# Patient Record
Sex: FEMALE | Race: OTHER | HISPANIC OR LATINO | Employment: OTHER | URBAN - METROPOLITAN AREA
[De-identification: names, ages, dates, MRNs, and addresses within clinical notes are randomized per-mention and may not be internally consistent; named-entity substitution may affect disease eponyms.]

---

## 2017-01-24 ENCOUNTER — TRANSCRIBE ORDERS (OUTPATIENT)
Dept: LAB | Facility: CLINIC | Age: 52
End: 2017-01-24

## 2017-01-24 ENCOUNTER — APPOINTMENT (OUTPATIENT)
Dept: LAB | Facility: CLINIC | Age: 52
End: 2017-01-24
Payer: MEDICARE

## 2017-01-24 DIAGNOSIS — I26.99 PULMONARY INFARCTION (HCC): Primary | ICD-10-CM

## 2017-01-24 DIAGNOSIS — I26.99 PULMONARY INFARCTION (HCC): ICD-10-CM

## 2017-01-24 LAB
INR PPP: 1.71 (ref 0.86–1.16)
PROTHROMBIN TIME: 20 SECONDS (ref 12–14.3)

## 2017-01-24 PROCEDURE — 85610 PROTHROMBIN TIME: CPT

## 2017-01-24 PROCEDURE — 36415 COLL VENOUS BLD VENIPUNCTURE: CPT

## 2017-02-14 ENCOUNTER — APPOINTMENT (OUTPATIENT)
Dept: LAB | Facility: CLINIC | Age: 52
End: 2017-02-14
Payer: MEDICARE

## 2017-02-14 DIAGNOSIS — I26.99 PULMONARY INFARCTION (HCC): ICD-10-CM

## 2017-02-14 LAB
INR PPP: 1.1 (ref 0.86–1.16)
PROTHROMBIN TIME: 14.3 SECONDS (ref 12–14.3)

## 2017-02-14 PROCEDURE — 85610 PROTHROMBIN TIME: CPT

## 2017-02-14 PROCEDURE — 36415 COLL VENOUS BLD VENIPUNCTURE: CPT

## 2017-02-18 ENCOUNTER — APPOINTMENT (EMERGENCY)
Dept: RADIOLOGY | Facility: HOSPITAL | Age: 52
End: 2017-02-18
Payer: MEDICARE

## 2017-02-18 ENCOUNTER — HOSPITAL ENCOUNTER (OUTPATIENT)
Facility: HOSPITAL | Age: 52
Setting detail: OBSERVATION
Discharge: HOME | End: 2017-02-19
Attending: EMERGENCY MEDICINE | Admitting: INTERNAL MEDICINE
Payer: MEDICARE

## 2017-02-18 DIAGNOSIS — R06.00 DYSPNEA: Primary | ICD-10-CM

## 2017-02-18 DIAGNOSIS — I10 ESSENTIAL HYPERTENSION: Chronic | ICD-10-CM

## 2017-02-18 DIAGNOSIS — N18.6 ESRD ON HEMODIALYSIS (HCC): Chronic | ICD-10-CM

## 2017-02-18 DIAGNOSIS — E11.8 DIABETES MELLITUS TYPE 2 WITH COMPLICATIONS (HCC): ICD-10-CM

## 2017-02-18 DIAGNOSIS — Z86.718 HISTORY OF DVT (DEEP VEIN THROMBOSIS): ICD-10-CM

## 2017-02-18 DIAGNOSIS — D63.8 ANEMIA OF CHRONIC DISEASE: Chronic | ICD-10-CM

## 2017-02-18 DIAGNOSIS — Z99.2 ESRD ON HEMODIALYSIS (HCC): Chronic | ICD-10-CM

## 2017-02-18 DIAGNOSIS — N18.6 ESRD (END STAGE RENAL DISEASE) (HCC): ICD-10-CM

## 2017-02-18 PROBLEM — R06.89 DIFFICULTY BREATHING: Status: ACTIVE | Noted: 2017-02-18

## 2017-02-18 LAB
ANION GAP SERPL CALCULATED.3IONS-SCNC: 10 MMOL/L (ref 4–13)
BASOPHILS # BLD AUTO: 0.02 THOUSANDS/ΜL (ref 0–0.1)
BASOPHILS NFR BLD AUTO: 0 % (ref 0–1)
BUN SERPL-MCNC: 53 MG/DL (ref 5–25)
CALCIUM SERPL-MCNC: 7.9 MG/DL (ref 8.3–10.1)
CHLORIDE SERPL-SCNC: 104 MMOL/L (ref 100–108)
CO2 SERPL-SCNC: 28 MMOL/L (ref 21–32)
CREAT SERPL-MCNC: 5.68 MG/DL (ref 0.6–1.3)
EOSINOPHIL # BLD AUTO: 0.17 THOUSAND/ΜL (ref 0–0.61)
EOSINOPHIL NFR BLD AUTO: 3 % (ref 0–6)
ERYTHROCYTE [DISTWIDTH] IN BLOOD BY AUTOMATED COUNT: 12.6 % (ref 11.6–15.1)
GFR SERPL CREATININE-BSD FRML MDRD: 7.9 ML/MIN/1.73SQ M
GLUCOSE SERPL-MCNC: 142 MG/DL (ref 65–140)
HCT VFR BLD AUTO: 25.8 % (ref 34.8–46.1)
HGB BLD-MCNC: 8.4 G/DL (ref 11.5–15.4)
LYMPHOCYTES # BLD AUTO: 1.19 THOUSANDS/ΜL (ref 0.6–4.47)
LYMPHOCYTES NFR BLD AUTO: 17 % (ref 14–44)
MAGNESIUM SERPL-MCNC: 2.3 MG/DL (ref 1.6–2.6)
MCH RBC QN AUTO: 30.5 PG (ref 26.8–34.3)
MCHC RBC AUTO-ENTMCNC: 32.6 G/DL (ref 31.4–37.4)
MCV RBC AUTO: 94 FL (ref 82–98)
MONOCYTES # BLD AUTO: 0.37 THOUSAND/ΜL (ref 0.17–1.22)
MONOCYTES NFR BLD AUTO: 5 % (ref 4–12)
NEUTROPHILS # BLD AUTO: 5.12 THOUSANDS/ΜL (ref 1.85–7.62)
NEUTS SEG NFR BLD AUTO: 75 % (ref 43–75)
NRBC BLD AUTO-RTO: 0 /100 WBCS
NT-PROBNP SERPL-MCNC: ABNORMAL PG/ML
PLATELET # BLD AUTO: 183 THOUSANDS/UL (ref 149–390)
PMV BLD AUTO: 9.9 FL (ref 8.9–12.7)
POTASSIUM SERPL-SCNC: 4.7 MMOL/L (ref 3.5–5.3)
RBC # BLD AUTO: 2.75 MILLION/UL (ref 3.81–5.12)
SODIUM SERPL-SCNC: 142 MMOL/L (ref 136–145)
SPECIMEN SOURCE: NORMAL
TROPONIN I BLD-MCNC: 0.01 NG/ML (ref 0–0.08)
WBC # BLD AUTO: 6.9 THOUSAND/UL (ref 4.31–10.16)

## 2017-02-18 PROCEDURE — 85025 COMPLETE CBC W/AUTO DIFF WBC: CPT | Performed by: EMERGENCY MEDICINE

## 2017-02-18 PROCEDURE — 93005 ELECTROCARDIOGRAM TRACING: CPT

## 2017-02-18 PROCEDURE — 84484 ASSAY OF TROPONIN QUANT: CPT

## 2017-02-18 PROCEDURE — 96374 THER/PROPH/DIAG INJ IV PUSH: CPT

## 2017-02-18 PROCEDURE — 80048 BASIC METABOLIC PNL TOTAL CA: CPT | Performed by: EMERGENCY MEDICINE

## 2017-02-18 PROCEDURE — 71020 HB CHEST X-RAY 2VW FRONTAL&LATL: CPT

## 2017-02-18 PROCEDURE — 36415 COLL VENOUS BLD VENIPUNCTURE: CPT | Performed by: EMERGENCY MEDICINE

## 2017-02-18 PROCEDURE — 83880 ASSAY OF NATRIURETIC PEPTIDE: CPT | Performed by: EMERGENCY MEDICINE

## 2017-02-18 PROCEDURE — 83735 ASSAY OF MAGNESIUM: CPT | Performed by: EMERGENCY MEDICINE

## 2017-02-18 RX ORDER — ONDANSETRON 2 MG/ML
4 INJECTION INTRAMUSCULAR; INTRAVENOUS ONCE
Status: COMPLETED | OUTPATIENT
Start: 2017-02-18 | End: 2017-02-18

## 2017-02-18 RX ORDER — ACETAMINOPHEN 325 MG/1
650 TABLET ORAL ONCE
Status: COMPLETED | OUTPATIENT
Start: 2017-02-18 | End: 2017-02-18

## 2017-02-18 RX ADMIN — ONDANSETRON 4 MG: 2 INJECTION INTRAMUSCULAR; INTRAVENOUS at 21:58

## 2017-02-18 RX ADMIN — ACETAMINOPHEN 650 MG: 325 TABLET ORAL at 21:58

## 2017-02-18 RX ADMIN — NITROGLYCERIN 0.5 INCH: 20 OINTMENT TOPICAL at 21:33

## 2017-02-19 ENCOUNTER — APPOINTMENT (OUTPATIENT)
Dept: DIALYSIS | Facility: HOSPITAL | Age: 52
End: 2017-02-19
Payer: MEDICARE

## 2017-02-19 VITALS
DIASTOLIC BLOOD PRESSURE: 51 MMHG | TEMPERATURE: 97.1 F | HEIGHT: 66 IN | SYSTOLIC BLOOD PRESSURE: 119 MMHG | BODY MASS INDEX: 41.42 KG/M2 | WEIGHT: 257.72 LBS | HEART RATE: 67 BPM | RESPIRATION RATE: 16 BRPM | OXYGEN SATURATION: 97 %

## 2017-02-19 PROBLEM — R06.00 DYSPNEA: Status: RESOLVED | Noted: 2017-02-18 | Resolved: 2017-02-19

## 2017-02-19 PROBLEM — E87.70 VOLUME OVERLOAD: Status: ACTIVE | Noted: 2017-02-19

## 2017-02-19 PROBLEM — Z91.199 NONCOMPLIANCE: Chronic | Status: ACTIVE | Noted: 2017-02-19

## 2017-02-19 PROBLEM — Z91.19 NONCOMPLIANCE: Chronic | Status: ACTIVE | Noted: 2017-02-19

## 2017-02-19 PROBLEM — R06.00 DYSPNEA: Status: ACTIVE | Noted: 2017-02-18

## 2017-02-19 LAB
ATRIAL RATE: 87 BPM
GLUCOSE SERPL-MCNC: 146 MG/DL (ref 65–140)
GLUCOSE SERPL-MCNC: 188 MG/DL (ref 65–140)
INR PPP: 1.24 (ref 0.86–1.16)
P AXIS: 69 DEGREES
PR INTERVAL: 156 MS
PROTHROMBIN TIME: 15.7 SECONDS (ref 12–14.3)
QRS AXIS: 72 DEGREES
QRSD INTERVAL: 90 MS
QT INTERVAL: 346 MS
QTC INTERVAL: 416 MS
T WAVE AXIS: 79 DEGREES
VENTRICULAR RATE: 87 BPM

## 2017-02-19 PROCEDURE — 85610 PROTHROMBIN TIME: CPT | Performed by: INTERNAL MEDICINE

## 2017-02-19 PROCEDURE — 99285 EMERGENCY DEPT VISIT HI MDM: CPT

## 2017-02-19 PROCEDURE — 82948 REAGENT STRIP/BLOOD GLUCOSE: CPT

## 2017-02-19 PROCEDURE — G0257 UNSCHED DIALYSIS ESRD PT HOS: HCPCS

## 2017-02-19 RX ORDER — METOCLOPRAMIDE 5 MG/1
5 TABLET ORAL
COMMUNITY
End: 2017-08-08

## 2017-02-19 RX ORDER — CIPROFLOXACIN HYDROCHLORIDE 3.5 MG/ML
1 SOLUTION/ DROPS TOPICAL 3 TIMES DAILY
Status: DISCONTINUED | OUTPATIENT
Start: 2017-02-19 | End: 2017-02-19 | Stop reason: HOSPADM

## 2017-02-19 RX ORDER — CALCIUM CARBONATE 200(500)MG
500 TABLET,CHEWABLE ORAL DAILY PRN
Status: DISCONTINUED | OUTPATIENT
Start: 2017-02-19 | End: 2017-02-19 | Stop reason: HOSPADM

## 2017-02-19 RX ORDER — ACETAZOLAMIDE 250 MG/1
250 TABLET ORAL 3 TIMES DAILY
Status: DISCONTINUED | OUTPATIENT
Start: 2017-02-19 | End: 2017-02-19 | Stop reason: HOSPADM

## 2017-02-19 RX ORDER — PREDNISOLONE ACETATE 10 MG/ML
1 SUSPENSION/ DROPS OPHTHALMIC 4 TIMES DAILY
Status: DISCONTINUED | OUTPATIENT
Start: 2017-02-19 | End: 2017-02-19 | Stop reason: HOSPADM

## 2017-02-19 RX ORDER — WARFARIN SODIUM 5 MG/1
10 TABLET ORAL
Status: ON HOLD | COMMUNITY
End: 2017-02-19

## 2017-02-19 RX ORDER — LEVOTHYROXINE SODIUM 0.05 MG/1
50 TABLET ORAL
Status: DISCONTINUED | OUTPATIENT
Start: 2017-02-19 | End: 2017-02-19 | Stop reason: HOSPADM

## 2017-02-19 RX ORDER — CLONIDINE HYDROCHLORIDE 0.1 MG/1
0.1 TABLET ORAL 2 TIMES DAILY
COMMUNITY
End: 2019-08-31 | Stop reason: HOSPADM

## 2017-02-19 RX ORDER — ACETAZOLAMIDE 125 MG/1
125 TABLET ORAL EVERY 8 HOURS SCHEDULED
Status: DISCONTINUED | OUTPATIENT
Start: 2017-02-19 | End: 2017-02-19

## 2017-02-19 RX ORDER — ALPRAZOLAM 0.25 MG/1
0.12 TABLET ORAL EVERY 4 HOURS PRN
Status: DISCONTINUED | OUTPATIENT
Start: 2017-02-19 | End: 2017-02-19 | Stop reason: HOSPADM

## 2017-02-19 RX ORDER — OXYCODONE HYDROCHLORIDE AND ACETAMINOPHEN 5; 325 MG/1; MG/1
1 TABLET ORAL EVERY 6 HOURS PRN
Status: DISCONTINUED | OUTPATIENT
Start: 2017-02-19 | End: 2017-02-19 | Stop reason: HOSPADM

## 2017-02-19 RX ORDER — ONDANSETRON 4 MG/1
4 TABLET, ORALLY DISINTEGRATING ORAL EVERY 4 HOURS PRN
Status: DISCONTINUED | OUTPATIENT
Start: 2017-02-19 | End: 2017-02-19 | Stop reason: HOSPADM

## 2017-02-19 RX ORDER — BRIMONIDINE TARTRATE 0.15 %
1 DROPS OPHTHALMIC (EYE) 3 TIMES DAILY
Status: DISCONTINUED | OUTPATIENT
Start: 2017-02-19 | End: 2017-02-19 | Stop reason: HOSPADM

## 2017-02-19 RX ORDER — HYDRALAZINE HYDROCHLORIDE 20 MG/ML
10 INJECTION INTRAMUSCULAR; INTRAVENOUS EVERY 4 HOURS PRN
Status: DISCONTINUED | OUTPATIENT
Start: 2017-02-19 | End: 2017-02-19

## 2017-02-19 RX ORDER — WARFARIN SODIUM 5 MG/1
12.5 TABLET ORAL
Qty: 75 TABLET | Refills: 0 | Status: ON HOLD
Start: 2017-02-19 | End: 2017-04-09

## 2017-02-19 RX ORDER — DORZOLAMIDE HYDROCHLORIDE AND TIMOLOL MALEATE 20; 5 MG/ML; MG/ML
1 SOLUTION/ DROPS OPHTHALMIC 2 TIMES DAILY
Status: DISCONTINUED | OUTPATIENT
Start: 2017-02-19 | End: 2017-02-19 | Stop reason: HOSPADM

## 2017-02-19 RX ORDER — PAROXETINE HYDROCHLORIDE 20 MG/1
10 TABLET, FILM COATED ORAL EVERY MORNING
Status: DISCONTINUED | OUTPATIENT
Start: 2017-02-19 | End: 2017-02-19 | Stop reason: HOSPADM

## 2017-02-19 RX ORDER — LOSARTAN POTASSIUM 50 MG/1
100 TABLET ORAL DAILY
Status: DISCONTINUED | OUTPATIENT
Start: 2017-02-19 | End: 2017-02-19 | Stop reason: HOSPADM

## 2017-02-19 RX ORDER — MELATONIN
2000 DAILY
Status: DISCONTINUED | OUTPATIENT
Start: 2017-02-19 | End: 2017-02-19 | Stop reason: HOSPADM

## 2017-02-19 RX ORDER — INSULIN GLARGINE 100 [IU]/ML
24 INJECTION, SOLUTION SUBCUTANEOUS
Qty: 720 UNITS | Refills: 0 | Status: ON HOLD
Start: 2017-02-19 | End: 2019-04-27 | Stop reason: CLARIF

## 2017-02-19 RX ORDER — INSULIN GLARGINE 100 [IU]/ML
28 INJECTION, SOLUTION SUBCUTANEOUS
Status: DISCONTINUED | OUTPATIENT
Start: 2017-02-19 | End: 2017-02-19 | Stop reason: HOSPADM

## 2017-02-19 RX ORDER — CARVEDILOL 25 MG/1
25 TABLET ORAL 2 TIMES DAILY WITH MEALS
Status: DISCONTINUED | OUTPATIENT
Start: 2017-02-19 | End: 2017-02-19 | Stop reason: HOSPADM

## 2017-02-19 RX ORDER — ALBUTEROL SULFATE 90 UG/1
2 AEROSOL, METERED RESPIRATORY (INHALATION) EVERY 4 HOURS PRN
Status: DISCONTINUED | OUTPATIENT
Start: 2017-02-19 | End: 2017-02-19 | Stop reason: HOSPADM

## 2017-02-19 RX ORDER — CHOLECALCIFEROL (VITAMIN D3) 10 MCG
1 TABLET ORAL DAILY
Status: DISCONTINUED | OUTPATIENT
Start: 2017-02-19 | End: 2017-02-19 | Stop reason: HOSPADM

## 2017-02-19 RX ORDER — OXYCODONE HYDROCHLORIDE AND ACETAMINOPHEN 5; 325 MG/1; MG/1
TABLET ORAL
Status: COMPLETED
Start: 2017-02-19 | End: 2017-02-19

## 2017-02-19 RX ORDER — DIPHENHYDRAMINE HCL 25 MG
25 TABLET ORAL
Status: DISCONTINUED | OUTPATIENT
Start: 2017-02-19 | End: 2017-02-19 | Stop reason: HOSPADM

## 2017-02-19 RX ORDER — DOCUSATE SODIUM 100 MG/1
100 CAPSULE, LIQUID FILLED ORAL 2 TIMES DAILY PRN
Status: DISCONTINUED | OUTPATIENT
Start: 2017-02-19 | End: 2017-02-19 | Stop reason: HOSPADM

## 2017-02-19 RX ORDER — LANTHANUM CARBONATE 500 MG/1
1000 TABLET, CHEWABLE ORAL
Status: DISCONTINUED | OUTPATIENT
Start: 2017-02-19 | End: 2017-02-19 | Stop reason: HOSPADM

## 2017-02-19 RX ORDER — OXYCODONE HYDROCHLORIDE 10 MG/1
10 TABLET ORAL EVERY 6 HOURS PRN
Status: DISCONTINUED | OUTPATIENT
Start: 2017-02-19 | End: 2017-02-19

## 2017-02-19 RX ORDER — AMLODIPINE BESYLATE 10 MG/1
10 TABLET ORAL DAILY
Status: DISCONTINUED | OUTPATIENT
Start: 2017-02-19 | End: 2017-02-19 | Stop reason: HOSPADM

## 2017-02-19 RX ORDER — OXYCODONE HYDROCHLORIDE AND ACETAMINOPHEN 5; 325 MG/1; MG/1
1 TABLET ORAL EVERY 8 HOURS PRN
Qty: 5 TABLET | Refills: 0 | Status: ON HOLD | OUTPATIENT
Start: 2017-02-19 | End: 2017-04-09

## 2017-02-19 RX ORDER — OMEPRAZOLE 20 MG/1
20 CAPSULE, DELAYED RELEASE ORAL DAILY
COMMUNITY
End: 2017-02-19 | Stop reason: HOSPADM

## 2017-02-19 RX ORDER — GABAPENTIN 100 MG/1
200 CAPSULE ORAL
Status: DISCONTINUED | OUTPATIENT
Start: 2017-02-19 | End: 2017-02-19 | Stop reason: HOSPADM

## 2017-02-19 RX ORDER — ONDANSETRON HYDROCHLORIDE 4 MG/5ML
4 SOLUTION ORAL EVERY 4 HOURS PRN
Status: DISCONTINUED | OUTPATIENT
Start: 2017-02-19 | End: 2017-02-19 | Stop reason: CLARIF

## 2017-02-19 RX ORDER — CHOLECALCIFEROL (VITAMIN D3) 25 MCG
1 TABLET,CHEWABLE ORAL
Status: DISCONTINUED | OUTPATIENT
Start: 2017-02-19 | End: 2017-02-19

## 2017-02-19 RX ORDER — CLONIDINE HYDROCHLORIDE 0.1 MG/1
0.3 TABLET ORAL
Status: DISCONTINUED | OUTPATIENT
Start: 2017-02-19 | End: 2017-02-19 | Stop reason: HOSPADM

## 2017-02-19 RX ORDER — WARFARIN SODIUM 5 MG/1
10 TABLET ORAL
Status: DISCONTINUED | OUTPATIENT
Start: 2017-02-19 | End: 2017-02-19 | Stop reason: HOSPADM

## 2017-02-19 RX ORDER — PANTOPRAZOLE SODIUM 40 MG/1
40 TABLET, DELAYED RELEASE ORAL 2 TIMES DAILY
Status: DISCONTINUED | OUTPATIENT
Start: 2017-02-19 | End: 2017-02-19 | Stop reason: HOSPADM

## 2017-02-19 RX ORDER — ATROPINE SULFATE 10 MG/ML
1 SOLUTION/ DROPS OPHTHALMIC 3 TIMES DAILY
Status: DISCONTINUED | OUTPATIENT
Start: 2017-02-19 | End: 2017-02-19 | Stop reason: HOSPADM

## 2017-02-19 RX ADMIN — LEVOTHYROXINE SODIUM 50 MCG: 50 TABLET ORAL at 06:38

## 2017-02-19 RX ADMIN — CIPROFLOXACIN HYDROCHLORIDE 1 DROP: 3 SOLUTION/ DROPS OPHTHALMIC at 03:53

## 2017-02-19 RX ADMIN — GABAPENTIN 200 MG: 100 CAPSULE ORAL at 03:49

## 2017-02-19 RX ADMIN — ACETAZOLAMIDE 250 MG: 250 TABLET ORAL at 03:49

## 2017-02-19 RX ADMIN — ATROPINE SULFATE 1 DROP: 10 SOLUTION/ DROPS OPHTHALMIC at 03:51

## 2017-02-19 RX ADMIN — BRIMONIDINE TARTRATE 1 DROP: 1.5 SOLUTION OPHTHALMIC at 03:51

## 2017-02-19 RX ADMIN — PANTOPRAZOLE SODIUM 40 MG: 40 TABLET, DELAYED RELEASE ORAL at 06:38

## 2017-02-19 RX ADMIN — CLONIDINE HYDROCHLORIDE 0.3 MG: 0.1 TABLET ORAL at 03:49

## 2017-02-19 RX ADMIN — OXYCODONE HYDROCHLORIDE AND ACETAMINOPHEN 1 TABLET: 5; 325 TABLET ORAL at 00:29

## 2017-02-19 RX ADMIN — DORZOLAMIDE HYDROCHLORIDE AND TIMOLOL MALEATE 1 DROP: 20; 5 SOLUTION/ DROPS OPHTHALMIC at 03:55

## 2017-02-19 RX ADMIN — PREDNISOLONE ACETATE 1 DROP: 10 SUSPENSION/ DROPS OPHTHALMIC at 03:54

## 2017-02-19 RX ADMIN — HYDRALAZINE HYDROCHLORIDE 10 MG: 20 INJECTION INTRAMUSCULAR; INTRAVENOUS at 03:32

## 2017-02-19 RX ADMIN — DIPHENHYDRAMINE HCL 25 MG: 25 TABLET ORAL at 03:49

## 2017-02-19 RX ADMIN — NITROGLYCERIN 0.5 INCH: 20 OINTMENT TOPICAL at 03:38

## 2017-02-19 RX ADMIN — OXYCODONE HYDROCHLORIDE AND ACETAMINOPHEN 1 TABLET: 5; 325 TABLET ORAL at 08:55

## 2017-04-06 ENCOUNTER — APPOINTMENT (INPATIENT)
Dept: RADIOLOGY | Facility: HOSPITAL | Age: 52
DRG: 640 | End: 2017-04-06
Payer: MEDICARE

## 2017-04-06 ENCOUNTER — HOSPITAL ENCOUNTER (INPATIENT)
Facility: HOSPITAL | Age: 52
LOS: 3 days | Discharge: HOME | DRG: 640 | End: 2017-04-09
Attending: EMERGENCY MEDICINE | Admitting: HOSPITALIST
Payer: MEDICARE

## 2017-04-06 ENCOUNTER — APPOINTMENT (EMERGENCY)
Dept: RADIOLOGY | Facility: HOSPITAL | Age: 52
DRG: 640 | End: 2017-04-06
Payer: MEDICARE

## 2017-04-06 DIAGNOSIS — D64.9 SYMPTOMATIC ANEMIA: ICD-10-CM

## 2017-04-06 DIAGNOSIS — E87.70 HYPERVOLEMIA, UNSPECIFIED HYPERVOLEMIA TYPE: ICD-10-CM

## 2017-04-06 DIAGNOSIS — N18.6 ESRD ON HEMODIALYSIS (HCC): Chronic | ICD-10-CM

## 2017-04-06 DIAGNOSIS — E87.5 HYPERKALEMIA: ICD-10-CM

## 2017-04-06 DIAGNOSIS — Z99.2 ESRD ON HEMODIALYSIS (HCC): Chronic | ICD-10-CM

## 2017-04-06 DIAGNOSIS — R06.00 DYSPNEA: Primary | ICD-10-CM

## 2017-04-06 DIAGNOSIS — N18.6 ESRD (END STAGE RENAL DISEASE) (HCC): ICD-10-CM

## 2017-04-06 DIAGNOSIS — D64.9 ANEMIA: ICD-10-CM

## 2017-04-06 DIAGNOSIS — E87.70 FLUID OVERLOAD: ICD-10-CM

## 2017-04-06 PROBLEM — I10 ACCELERATED HYPERTENSION: Status: ACTIVE | Noted: 2017-04-06

## 2017-04-06 PROBLEM — F41.9 ANXIETY DISORDER: Status: ACTIVE | Noted: 2017-04-06

## 2017-04-06 LAB
ANION GAP SERPL CALCULATED.3IONS-SCNC: 11 MMOL/L (ref 4–13)
BASOPHILS # BLD AUTO: 0.02 THOUSANDS/ΜL (ref 0–0.1)
BASOPHILS NFR BLD AUTO: 0 % (ref 0–1)
BUN SERPL-MCNC: 88 MG/DL (ref 5–25)
CALCIUM SERPL-MCNC: 7.3 MG/DL (ref 8.3–10.1)
CHLORIDE SERPL-SCNC: 100 MMOL/L (ref 100–108)
CO2 SERPL-SCNC: 26 MMOL/L (ref 21–32)
CREAT SERPL-MCNC: 9.59 MG/DL (ref 0.6–1.3)
DEPRECATED D DIMER PPP: 1174 NG/ML (FEU) (ref 0–424)
EOSINOPHIL # BLD AUTO: 0.18 THOUSAND/ΜL (ref 0–0.61)
EOSINOPHIL NFR BLD AUTO: 3 % (ref 0–6)
ERYTHROCYTE [DISTWIDTH] IN BLOOD BY AUTOMATED COUNT: 13.9 % (ref 11.6–15.1)
GFR SERPL CREATININE-BSD FRML MDRD: 4.3 ML/MIN/1.73SQ M
GLUCOSE SERPL-MCNC: 111 MG/DL (ref 65–140)
GLUCOSE SERPL-MCNC: 170 MG/DL (ref 65–140)
GLUCOSE SERPL-MCNC: 202 MG/DL (ref 65–140)
GLUCOSE SERPL-MCNC: 37 MG/DL (ref 65–140)
HCT VFR BLD AUTO: 20.6 % (ref 34.8–46.1)
HGB BLD-MCNC: 6.7 G/DL (ref 11.5–15.4)
INR PPP: 1.15 (ref 0.86–1.16)
LYMPHOCYTES # BLD AUTO: 0.54 THOUSANDS/ΜL (ref 0.6–4.47)
LYMPHOCYTES NFR BLD AUTO: 9 % (ref 14–44)
MAGNESIUM SERPL-MCNC: 2.3 MG/DL (ref 1.6–2.6)
MCH RBC QN AUTO: 31.9 PG (ref 26.8–34.3)
MCHC RBC AUTO-ENTMCNC: 32.5 G/DL (ref 31.4–37.4)
MCV RBC AUTO: 98 FL (ref 82–98)
MONOCYTES # BLD AUTO: 0.5 THOUSAND/ΜL (ref 0.17–1.22)
MONOCYTES NFR BLD AUTO: 8 % (ref 4–12)
NEUTROPHILS # BLD AUTO: 5.05 THOUSANDS/ΜL (ref 1.85–7.62)
NEUTS SEG NFR BLD AUTO: 80 % (ref 43–75)
NRBC BLD AUTO-RTO: 0 /100 WBCS
NT-PROBNP SERPL-MCNC: ABNORMAL PG/ML
PHOSPHATE SERPL-MCNC: 8.3 MG/DL (ref 2.7–4.5)
PLATELET # BLD AUTO: 170 THOUSANDS/UL (ref 149–390)
PMV BLD AUTO: 10.1 FL (ref 8.9–12.7)
POTASSIUM SERPL-SCNC: 6.3 MMOL/L (ref 3.5–5.3)
PROTHROMBIN TIME: 14.8 SECONDS (ref 12–14.3)
RBC # BLD AUTO: 2.1 MILLION/UL (ref 3.81–5.12)
SODIUM SERPL-SCNC: 137 MMOL/L (ref 136–145)
WBC # BLD AUTO: 6.31 THOUSAND/UL (ref 4.31–10.16)

## 2017-04-06 PROCEDURE — 94760 N-INVAS EAR/PLS OXIMETRY 1: CPT

## 2017-04-06 PROCEDURE — 96374 THER/PROPH/DIAG INJ IV PUSH: CPT

## 2017-04-06 PROCEDURE — 85025 COMPLETE CBC W/AUTO DIFF WBC: CPT | Performed by: EMERGENCY MEDICINE

## 2017-04-06 PROCEDURE — 83735 ASSAY OF MAGNESIUM: CPT | Performed by: EMERGENCY MEDICINE

## 2017-04-06 PROCEDURE — 84100 ASSAY OF PHOSPHORUS: CPT | Performed by: EMERGENCY MEDICINE

## 2017-04-06 PROCEDURE — 94644 CONT INHLJ TX 1ST HOUR: CPT

## 2017-04-06 PROCEDURE — 80048 BASIC METABOLIC PNL TOTAL CA: CPT | Performed by: EMERGENCY MEDICINE

## 2017-04-06 PROCEDURE — 71275 CT ANGIOGRAPHY CHEST: CPT

## 2017-04-06 PROCEDURE — 85379 FIBRIN DEGRADATION QUANT: CPT | Performed by: EMERGENCY MEDICINE

## 2017-04-06 PROCEDURE — 83880 ASSAY OF NATRIURETIC PEPTIDE: CPT | Performed by: EMERGENCY MEDICINE

## 2017-04-06 PROCEDURE — 85610 PROTHROMBIN TIME: CPT | Performed by: HOSPITALIST

## 2017-04-06 PROCEDURE — 82948 REAGENT STRIP/BLOOD GLUCOSE: CPT

## 2017-04-06 PROCEDURE — 93005 ELECTROCARDIOGRAM TRACING: CPT

## 2017-04-06 PROCEDURE — 93005 ELECTROCARDIOGRAM TRACING: CPT | Performed by: EMERGENCY MEDICINE

## 2017-04-06 PROCEDURE — 71010 HB CHEST X-RAY 1 VIEW FRONTAL (PORTABLE): CPT

## 2017-04-06 PROCEDURE — 96375 TX/PRO/DX INJ NEW DRUG ADDON: CPT

## 2017-04-06 PROCEDURE — 36415 COLL VENOUS BLD VENIPUNCTURE: CPT | Performed by: EMERGENCY MEDICINE

## 2017-04-06 RX ORDER — METHAZOLAMIDE 50 MG/1
25 TABLET ORAL DAILY
Status: DISCONTINUED | OUTPATIENT
Start: 2017-04-07 | End: 2017-04-08

## 2017-04-06 RX ORDER — DIPHENHYDRAMINE HYDROCHLORIDE 50 MG/ML
50 INJECTION INTRAMUSCULAR; INTRAVENOUS ONCE
Status: COMPLETED | OUTPATIENT
Start: 2017-04-06 | End: 2017-04-06

## 2017-04-06 RX ORDER — DEXTROSE MONOHYDRATE 25 G/50ML
INJECTION, SOLUTION INTRAVENOUS
Status: COMPLETED
Start: 2017-04-06 | End: 2017-04-06

## 2017-04-06 RX ORDER — INSULIN GLARGINE 100 [IU]/ML
20 INJECTION, SOLUTION SUBCUTANEOUS
Status: DISCONTINUED | OUTPATIENT
Start: 2017-04-06 | End: 2017-04-07

## 2017-04-06 RX ORDER — LABETALOL HYDROCHLORIDE 5 MG/ML
10 INJECTION, SOLUTION INTRAVENOUS EVERY 4 HOURS PRN
Status: DISCONTINUED | OUTPATIENT
Start: 2017-04-06 | End: 2017-04-07

## 2017-04-06 RX ORDER — PANTOPRAZOLE SODIUM 40 MG/1
40 TABLET, DELAYED RELEASE ORAL 2 TIMES DAILY
Status: DISCONTINUED | OUTPATIENT
Start: 2017-04-07 | End: 2017-04-09 | Stop reason: HOSPADM

## 2017-04-06 RX ORDER — DEXTROSE MONOHYDRATE 25 G/50ML
50 INJECTION, SOLUTION INTRAVENOUS ONCE
Status: COMPLETED | OUTPATIENT
Start: 2017-04-06 | End: 2017-04-06

## 2017-04-06 RX ORDER — TORSEMIDE 100 MG/1
200 TABLET ORAL
Status: DISCONTINUED | OUTPATIENT
Start: 2017-04-07 | End: 2017-04-09 | Stop reason: HOSPADM

## 2017-04-06 RX ORDER — PAROXETINE HYDROCHLORIDE 20 MG/1
20 TABLET, FILM COATED ORAL EVERY MORNING
Status: DISCONTINUED | OUTPATIENT
Start: 2017-04-07 | End: 2017-04-09 | Stop reason: HOSPADM

## 2017-04-06 RX ORDER — ACETAMINOPHEN 325 MG/1
650 TABLET ORAL EVERY 8 HOURS PRN
Status: DISCONTINUED | OUTPATIENT
Start: 2017-04-06 | End: 2017-04-09 | Stop reason: HOSPADM

## 2017-04-06 RX ORDER — LABETALOL HYDROCHLORIDE 5 MG/ML
10 INJECTION, SOLUTION INTRAVENOUS ONCE
Status: COMPLETED | OUTPATIENT
Start: 2017-04-06 | End: 2017-04-06

## 2017-04-06 RX ORDER — MELATONIN
1000 DAILY
Status: DISCONTINUED | OUTPATIENT
Start: 2017-04-07 | End: 2017-04-09 | Stop reason: HOSPADM

## 2017-04-06 RX ORDER — INSULIN GLARGINE 100 [IU]/ML
24 INJECTION, SOLUTION SUBCUTANEOUS
Status: DISCONTINUED | OUTPATIENT
Start: 2017-04-06 | End: 2017-04-06

## 2017-04-06 RX ORDER — DOCUSATE SODIUM 100 MG/1
100 CAPSULE, LIQUID FILLED ORAL 2 TIMES DAILY
Status: DISCONTINUED | OUTPATIENT
Start: 2017-04-07 | End: 2017-04-09 | Stop reason: HOSPADM

## 2017-04-06 RX ORDER — HEPARIN SODIUM 5000 [USP'U]/ML
7500 INJECTION, SOLUTION INTRAVENOUS; SUBCUTANEOUS EVERY 8 HOURS SCHEDULED
Status: DISCONTINUED | OUTPATIENT
Start: 2017-04-06 | End: 2017-04-09 | Stop reason: HOSPADM

## 2017-04-06 RX ORDER — DORZOLAMIDE HYDROCHLORIDE AND TIMOLOL MALEATE 20; 5 MG/ML; MG/ML
1 SOLUTION/ DROPS OPHTHALMIC 2 TIMES DAILY
Status: DISCONTINUED | OUTPATIENT
Start: 2017-04-07 | End: 2017-04-08

## 2017-04-06 RX ORDER — CARVEDILOL 25 MG/1
25 TABLET ORAL 2 TIMES DAILY WITH MEALS
Status: DISCONTINUED | OUTPATIENT
Start: 2017-04-07 | End: 2017-04-09 | Stop reason: HOSPADM

## 2017-04-06 RX ORDER — WARFARIN SODIUM 10 MG/1
10 TABLET ORAL
Status: DISCONTINUED | OUTPATIENT
Start: 2017-04-07 | End: 2017-04-06

## 2017-04-06 RX ORDER — ALPRAZOLAM 0.25 MG/1
0.25 TABLET ORAL ONCE
Status: DISCONTINUED | OUTPATIENT
Start: 2017-04-06 | End: 2017-04-09 | Stop reason: HOSPADM

## 2017-04-06 RX ORDER — NICOTINE 21 MG/24HR
1 PATCH, TRANSDERMAL 24 HOURS TRANSDERMAL DAILY
Status: DISCONTINUED | OUTPATIENT
Start: 2017-04-07 | End: 2017-04-09 | Stop reason: HOSPADM

## 2017-04-06 RX ORDER — SODIUM POLYSTYRENE SULFONATE 15 G/60ML
30 SUSPENSION ORAL; RECTAL ONCE
Status: COMPLETED | OUTPATIENT
Start: 2017-04-06 | End: 2017-04-06

## 2017-04-06 RX ORDER — DEXTROSE MONOHYDRATE 25 G/50ML
25 INJECTION, SOLUTION INTRAVENOUS ONCE
Status: COMPLETED | OUTPATIENT
Start: 2017-04-06 | End: 2017-04-06

## 2017-04-06 RX ORDER — ALBUTEROL SULFATE 2.5 MG/3ML
10 SOLUTION RESPIRATORY (INHALATION) ONCE
Status: COMPLETED | OUTPATIENT
Start: 2017-04-06 | End: 2017-04-06

## 2017-04-06 RX ORDER — BRIMONIDINE TARTRATE 0.15 %
1 DROPS OPHTHALMIC (EYE) 3 TIMES DAILY
Status: DISCONTINUED | OUTPATIENT
Start: 2017-04-07 | End: 2017-04-08

## 2017-04-06 RX ORDER — CHOLECALCIFEROL (VITAMIN D3) 10 MCG
1 TABLET ORAL DAILY
Status: DISCONTINUED | OUTPATIENT
Start: 2017-04-07 | End: 2017-04-09 | Stop reason: HOSPADM

## 2017-04-06 RX ORDER — LOSARTAN POTASSIUM 50 MG/1
100 TABLET ORAL DAILY
Status: DISCONTINUED | OUTPATIENT
Start: 2017-04-07 | End: 2017-04-07

## 2017-04-06 RX ORDER — PREDNISOLONE ACETATE 10 MG/ML
1 SUSPENSION/ DROPS OPHTHALMIC 4 TIMES DAILY
Status: DISCONTINUED | OUTPATIENT
Start: 2017-04-07 | End: 2017-04-08

## 2017-04-06 RX ORDER — SENNOSIDES 8.6 MG
1 TABLET ORAL DAILY
Status: DISCONTINUED | OUTPATIENT
Start: 2017-04-07 | End: 2017-04-09 | Stop reason: HOSPADM

## 2017-04-06 RX ORDER — LEVOTHYROXINE SODIUM 0.05 MG/1
50 TABLET ORAL
Status: DISCONTINUED | OUTPATIENT
Start: 2017-04-07 | End: 2017-04-09 | Stop reason: HOSPADM

## 2017-04-06 RX ORDER — ALPRAZOLAM 0.25 MG/1
0.25 TABLET ORAL 3 TIMES DAILY PRN
Status: DISCONTINUED | OUTPATIENT
Start: 2017-04-06 | End: 2017-04-08

## 2017-04-06 RX ORDER — GABAPENTIN 100 MG/1
100 CAPSULE ORAL 3 TIMES DAILY
Status: DISCONTINUED | OUTPATIENT
Start: 2017-04-06 | End: 2017-04-09 | Stop reason: HOSPADM

## 2017-04-06 RX ORDER — OXYCODONE HYDROCHLORIDE AND ACETAMINOPHEN 5; 325 MG/1; MG/1
1 TABLET ORAL EVERY 8 HOURS PRN
Status: DISCONTINUED | OUTPATIENT
Start: 2017-04-06 | End: 2017-04-09 | Stop reason: HOSPADM

## 2017-04-06 RX ORDER — ONDANSETRON 2 MG/ML
4 INJECTION INTRAMUSCULAR; INTRAVENOUS EVERY 6 HOURS PRN
Status: DISCONTINUED | OUTPATIENT
Start: 2017-04-06 | End: 2017-04-09 | Stop reason: HOSPADM

## 2017-04-06 RX ORDER — ALBUTEROL SULFATE 90 UG/1
2 AEROSOL, METERED RESPIRATORY (INHALATION) EVERY 4 HOURS PRN
Status: DISCONTINUED | OUTPATIENT
Start: 2017-04-06 | End: 2017-04-09 | Stop reason: HOSPADM

## 2017-04-06 RX ORDER — AMLODIPINE BESYLATE 10 MG/1
10 TABLET ORAL DAILY
Status: DISCONTINUED | OUTPATIENT
Start: 2017-04-07 | End: 2017-04-09 | Stop reason: HOSPADM

## 2017-04-06 RX ORDER — CLONIDINE HYDROCHLORIDE 0.1 MG/1
0.1 TABLET ORAL 2 TIMES DAILY
Status: DISCONTINUED | OUTPATIENT
Start: 2017-04-07 | End: 2017-04-09 | Stop reason: HOSPADM

## 2017-04-06 RX ADMIN — HYDROCORTISONE SODIUM SUCCINATE 200 MG: 100 INJECTION, POWDER, FOR SOLUTION INTRAMUSCULAR; INTRAVENOUS at 19:35

## 2017-04-06 RX ADMIN — CALCIUM GLUCONATE 1 G: 94 INJECTION, SOLUTION INTRAVENOUS at 21:03

## 2017-04-06 RX ADMIN — ALBUTEROL SULFATE 10 MG: 2.5 SOLUTION RESPIRATORY (INHALATION) at 20:40

## 2017-04-06 RX ADMIN — SODIUM POLYSTYRENE SULFONATE 30 G: 15 SUSPENSION ORAL; RECTAL at 19:57

## 2017-04-06 RX ADMIN — DEXTROSE MONOHYDRATE 25 ML: 25 INJECTION, SOLUTION INTRAVENOUS at 20:33

## 2017-04-06 RX ADMIN — INSULIN HUMAN 10 UNITS: 100 INJECTION, SOLUTION PARENTERAL at 20:33

## 2017-04-06 RX ADMIN — DEXTROSE MONOHYDRATE 50 ML: 25 INJECTION, SOLUTION INTRAVENOUS at 21:37

## 2017-04-06 RX ADMIN — LABETALOL HYDROCHLORIDE 10 MG: 5 INJECTION, SOLUTION INTRAVENOUS at 20:57

## 2017-04-06 RX ADMIN — DIPHENHYDRAMINE HYDROCHLORIDE 50 MG: 50 INJECTION, SOLUTION INTRAMUSCULAR; INTRAVENOUS at 22:27

## 2017-04-07 ENCOUNTER — APPOINTMENT (INPATIENT)
Dept: DIALYSIS | Facility: HOSPITAL | Age: 52
DRG: 640 | End: 2017-04-07
Payer: MEDICARE

## 2017-04-07 LAB
ABO GROUP BLD BPU: NORMAL
ABO GROUP BLD BPU: NORMAL
ABO GROUP BLD: NORMAL
ANION GAP SERPL CALCULATED.3IONS-SCNC: 12 MMOL/L (ref 4–13)
ANION GAP SERPL CALCULATED.3IONS-SCNC: 13 MMOL/L (ref 4–13)
ANION GAP SERPL CALCULATED.3IONS-SCNC: 14 MMOL/L (ref 4–13)
ATRIAL RATE: 82 BPM
ATRIAL RATE: 86 BPM
BLD GP AB SCN SERPL QL: NEGATIVE
BPU ID: NORMAL
BPU ID: NORMAL
BUN SERPL-MCNC: 100 MG/DL (ref 5–25)
BUN SERPL-MCNC: 94 MG/DL (ref 5–25)
BUN SERPL-MCNC: 97 MG/DL (ref 5–25)
CALCIUM SERPL-MCNC: 7.1 MG/DL (ref 8.3–10.1)
CALCIUM SERPL-MCNC: 7.2 MG/DL (ref 8.3–10.1)
CALCIUM SERPL-MCNC: 7.2 MG/DL (ref 8.3–10.1)
CHLORIDE SERPL-SCNC: 97 MMOL/L (ref 100–108)
CHLORIDE SERPL-SCNC: 97 MMOL/L (ref 100–108)
CHLORIDE SERPL-SCNC: 99 MMOL/L (ref 100–108)
CO2 SERPL-SCNC: 25 MMOL/L (ref 21–32)
CREAT SERPL-MCNC: 10.2 MG/DL (ref 0.6–1.3)
CREAT SERPL-MCNC: 9.72 MG/DL (ref 0.6–1.3)
CREAT SERPL-MCNC: 9.98 MG/DL (ref 0.6–1.3)
ERYTHROCYTE [DISTWIDTH] IN BLOOD BY AUTOMATED COUNT: 14.2 % (ref 11.6–15.1)
EST. AVERAGE GLUCOSE BLD GHB EST-MCNC: 140 MG/DL
GFR SERPL CREATININE-BSD FRML MDRD: 4 ML/MIN/1.73SQ M
GFR SERPL CREATININE-BSD FRML MDRD: 4.1 ML/MIN/1.73SQ M
GFR SERPL CREATININE-BSD FRML MDRD: 4.3 ML/MIN/1.73SQ M
GLUCOSE SERPL-MCNC: 140 MG/DL (ref 65–140)
GLUCOSE SERPL-MCNC: 190 MG/DL (ref 65–140)
GLUCOSE SERPL-MCNC: 207 MG/DL (ref 65–140)
GLUCOSE SERPL-MCNC: 234 MG/DL (ref 65–140)
GLUCOSE SERPL-MCNC: 301 MG/DL (ref 65–140)
GLUCOSE SERPL-MCNC: 305 MG/DL (ref 65–140)
GLUCOSE SERPL-MCNC: 307 MG/DL (ref 65–140)
GLUCOSE SERPL-MCNC: 317 MG/DL (ref 65–140)
GLUCOSE SERPL-MCNC: 317 MG/DL (ref 65–140)
GLUCOSE SERPL-MCNC: 385 MG/DL (ref 65–140)
HBA1C MFR BLD: 6.5 % (ref 4.2–6.3)
HCT VFR BLD AUTO: 19.6 % (ref 34.8–46.1)
HCT VFR BLD AUTO: 27.7 % (ref 34.8–46.1)
HGB BLD-MCNC: 6.3 G/DL (ref 11.5–15.4)
HGB BLD-MCNC: 9.1 G/DL (ref 11.5–15.4)
MCH RBC QN AUTO: 31.2 PG (ref 26.8–34.3)
MCHC RBC AUTO-ENTMCNC: 32.1 G/DL (ref 31.4–37.4)
MCV RBC AUTO: 97 FL (ref 82–98)
P AXIS: 23 DEGREES
P AXIS: 39 DEGREES
PLATELET # BLD AUTO: 162 THOUSANDS/UL (ref 149–390)
PLATELET # BLD AUTO: 163 THOUSANDS/UL (ref 149–390)
PMV BLD AUTO: 10.3 FL (ref 8.9–12.7)
PMV BLD AUTO: 9.9 FL (ref 8.9–12.7)
POTASSIUM SERPL-SCNC: 4.9 MMOL/L (ref 3.5–5.3)
POTASSIUM SERPL-SCNC: 5.9 MMOL/L (ref 3.5–5.3)
POTASSIUM SERPL-SCNC: 6.5 MMOL/L (ref 3.5–5.3)
PR INTERVAL: 172 MS
PR INTERVAL: 176 MS
QRS AXIS: 48 DEGREES
QRS AXIS: 54 DEGREES
QRSD INTERVAL: 90 MS
QRSD INTERVAL: 98 MS
QT INTERVAL: 392 MS
QT INTERVAL: 394 MS
QTC INTERVAL: 457 MS
QTC INTERVAL: 471 MS
RBC # BLD AUTO: 2.02 MILLION/UL (ref 3.81–5.12)
RH BLD: POSITIVE
SODIUM SERPL-SCNC: 134 MMOL/L (ref 136–145)
SODIUM SERPL-SCNC: 135 MMOL/L (ref 136–145)
SODIUM SERPL-SCNC: 138 MMOL/L (ref 136–145)
T WAVE AXIS: 71 DEGREES
T WAVE AXIS: 84 DEGREES
UNIT DISPENSE STATUS: NORMAL
UNIT DISPENSE STATUS: NORMAL
UNIT PRODUCT CODE: NORMAL
UNIT PRODUCT CODE: NORMAL
UNIT RH: NORMAL
UNIT RH: NORMAL
VENTRICULAR RATE: 82 BPM
VENTRICULAR RATE: 86 BPM
WBC # BLD AUTO: 7.26 THOUSAND/UL (ref 4.31–10.16)

## 2017-04-07 PROCEDURE — 86900 BLOOD TYPING SEROLOGIC ABO: CPT | Performed by: INTERNAL MEDICINE

## 2017-04-07 PROCEDURE — 82948 REAGENT STRIP/BLOOD GLUCOSE: CPT

## 2017-04-07 PROCEDURE — 86901 BLOOD TYPING SEROLOGIC RH(D): CPT | Performed by: INTERNAL MEDICINE

## 2017-04-07 PROCEDURE — 85049 AUTOMATED PLATELET COUNT: CPT | Performed by: HOSPITALIST

## 2017-04-07 PROCEDURE — 86923 COMPATIBILITY TEST ELECTRIC: CPT

## 2017-04-07 PROCEDURE — 5A1D60Z PERFORMANCE OF URINARY FILTRATION, MULTIPLE: ICD-10-PCS | Performed by: INTERNAL MEDICINE

## 2017-04-07 PROCEDURE — 85018 HEMOGLOBIN: CPT | Performed by: PHYSICIAN ASSISTANT

## 2017-04-07 PROCEDURE — 99285 EMERGENCY DEPT VISIT HI MDM: CPT

## 2017-04-07 PROCEDURE — 83036 HEMOGLOBIN GLYCOSYLATED A1C: CPT | Performed by: HOSPITALIST

## 2017-04-07 PROCEDURE — 85027 COMPLETE CBC AUTOMATED: CPT | Performed by: HOSPITALIST

## 2017-04-07 PROCEDURE — 36415 COLL VENOUS BLD VENIPUNCTURE: CPT | Performed by: HOSPITALIST

## 2017-04-07 PROCEDURE — 85014 HEMATOCRIT: CPT | Performed by: PHYSICIAN ASSISTANT

## 2017-04-07 PROCEDURE — 94644 CONT INHLJ TX 1ST HOUR: CPT

## 2017-04-07 PROCEDURE — P9040 RBC LEUKOREDUCED IRRADIATED: HCPCS

## 2017-04-07 PROCEDURE — 86850 RBC ANTIBODY SCREEN: CPT | Performed by: INTERNAL MEDICINE

## 2017-04-07 PROCEDURE — 30233N1 TRANSFUSION OF NONAUTOLOGOUS RED BLOOD CELLS INTO PERIPHERAL VEIN, PERCUTANEOUS APPROACH: ICD-10-PCS | Performed by: INTERNAL MEDICINE

## 2017-04-07 PROCEDURE — 80048 BASIC METABOLIC PNL TOTAL CA: CPT | Performed by: HOSPITALIST

## 2017-04-07 PROCEDURE — 94760 N-INVAS EAR/PLS OXIMETRY 1: CPT

## 2017-04-07 PROCEDURE — 94640 AIRWAY INHALATION TREATMENT: CPT

## 2017-04-07 PROCEDURE — 93005 ELECTROCARDIOGRAM TRACING: CPT | Performed by: HOSPITALIST

## 2017-04-07 RX ORDER — LABETALOL HYDROCHLORIDE 5 MG/ML
10 INJECTION, SOLUTION INTRAVENOUS EVERY 2 HOUR PRN
Status: DISCONTINUED | OUTPATIENT
Start: 2017-04-07 | End: 2017-04-09 | Stop reason: HOSPADM

## 2017-04-07 RX ORDER — LATANOPROST 50 UG/ML
1 SOLUTION/ DROPS OPHTHALMIC
Status: DISCONTINUED | OUTPATIENT
Start: 2017-04-07 | End: 2017-04-09 | Stop reason: HOSPADM

## 2017-04-07 RX ORDER — LATANOPROST 50 UG/ML
1 SOLUTION/ DROPS OPHTHALMIC
COMMUNITY
End: 2021-06-11 | Stop reason: HOSPADM

## 2017-04-07 RX ORDER — ALBUTEROL SULFATE 2.5 MG/3ML
10 SOLUTION RESPIRATORY (INHALATION)
Status: DISPENSED | OUTPATIENT
Start: 2017-04-07 | End: 2017-04-07

## 2017-04-07 RX ORDER — INSULIN GLARGINE 100 [IU]/ML
24 INJECTION, SOLUTION SUBCUTANEOUS
Status: DISCONTINUED | OUTPATIENT
Start: 2017-04-07 | End: 2017-04-08

## 2017-04-07 RX ORDER — LABETALOL HYDROCHLORIDE 5 MG/ML
10 INJECTION, SOLUTION INTRAVENOUS EVERY 2 HOUR PRN
Status: DISCONTINUED | OUTPATIENT
Start: 2017-04-07 | End: 2017-04-07

## 2017-04-07 RX ORDER — DIPHENHYDRAMINE HCL 25 MG
25 TABLET ORAL EVERY 6 HOURS PRN
Status: DISCONTINUED | OUTPATIENT
Start: 2017-04-07 | End: 2017-04-09 | Stop reason: HOSPADM

## 2017-04-07 RX ORDER — SODIUM POLYSTYRENE SULFONATE 15 G/60ML
15 SUSPENSION ORAL; RECTAL ONCE
Status: COMPLETED | OUTPATIENT
Start: 2017-04-07 | End: 2017-04-07

## 2017-04-07 RX ORDER — DEXTROSE MONOHYDRATE 25 G/50ML
25 INJECTION, SOLUTION INTRAVENOUS ONCE
Status: COMPLETED | OUTPATIENT
Start: 2017-04-07 | End: 2017-04-07

## 2017-04-07 RX ORDER — TORSEMIDE 100 MG/1
100 TABLET ORAL ONCE
Status: COMPLETED | OUTPATIENT
Start: 2017-04-07 | End: 2017-04-07

## 2017-04-07 RX ORDER — LORAZEPAM 2 MG/ML
0.5 INJECTION INTRAMUSCULAR ONCE
Status: COMPLETED | OUTPATIENT
Start: 2017-04-07 | End: 2017-04-07

## 2017-04-07 RX ADMIN — CALCIUM GLUCONATE 1 G: 94 INJECTION, SOLUTION INTRAVENOUS at 02:47

## 2017-04-07 RX ADMIN — ALPRAZOLAM 0.25 MG: 0.25 TABLET ORAL at 10:39

## 2017-04-07 RX ADMIN — HEPARIN SODIUM 7500 UNITS: 5000 INJECTION, SOLUTION INTRAVENOUS; SUBCUTANEOUS at 05:33

## 2017-04-07 RX ADMIN — OXYCODONE HYDROCHLORIDE AND ACETAMINOPHEN 1 TABLET: 5; 325 TABLET ORAL at 22:04

## 2017-04-07 RX ADMIN — BRIMONIDINE TARTRATE 1 DROP: 1.5 SOLUTION OPHTHALMIC at 16:16

## 2017-04-07 RX ADMIN — OXYCODONE HYDROCHLORIDE AND ACETAMINOPHEN 1 TABLET: 5; 325 TABLET ORAL at 12:09

## 2017-04-07 RX ADMIN — GABAPENTIN 100 MG: 100 CAPSULE ORAL at 22:04

## 2017-04-07 RX ADMIN — VITAMIN D, TAB 1000IU (100/BT) 1000 UNITS: 25 TAB at 16:24

## 2017-04-07 RX ADMIN — DOXERCALCIFEROL 0.5 MCG: 2 INJECTION, SOLUTION INTRAVENOUS at 10:16

## 2017-04-07 RX ADMIN — HEPARIN SODIUM 7500 UNITS: 5000 INJECTION, SOLUTION INTRAVENOUS; SUBCUTANEOUS at 22:05

## 2017-04-07 RX ADMIN — INSULIN HUMAN 10 UNITS: 100 INJECTION, SOLUTION PARENTERAL at 02:39

## 2017-04-07 RX ADMIN — DORZOLAMIDE HYDROCHLORIDE AND TIMOLOL MALEATE 1 DROP: 20; 5 SOLUTION/ DROPS OPHTHALMIC at 17:44

## 2017-04-07 RX ADMIN — PREDNISOLONE ACETATE 1 DROP: 10 SUSPENSION/ DROPS OPHTHALMIC at 22:08

## 2017-04-07 RX ADMIN — INSULIN LISPRO 4 UNITS: 100 INJECTION, SOLUTION INTRAVENOUS; SUBCUTANEOUS at 17:45

## 2017-04-07 RX ADMIN — LABETALOL HYDROCHLORIDE 10 MG: 5 INJECTION, SOLUTION INTRAVENOUS at 03:30

## 2017-04-07 RX ADMIN — INSULIN GLARGINE 24 UNITS: 100 INJECTION, SOLUTION SUBCUTANEOUS at 22:05

## 2017-04-07 RX ADMIN — CALCIUM CARBONATE 1000 MG: 1250 SUSPENSION ORAL at 08:09

## 2017-04-07 RX ADMIN — LATANOPROST 1 DROP: 50 SOLUTION OPHTHALMIC at 22:08

## 2017-04-07 RX ADMIN — HEPARIN SODIUM 7500 UNITS: 5000 INJECTION, SOLUTION INTRAVENOUS; SUBCUTANEOUS at 16:25

## 2017-04-07 RX ADMIN — LORAZEPAM 0.5 MG: 2 INJECTION INTRAMUSCULAR; INTRAVENOUS at 02:06

## 2017-04-07 RX ADMIN — CLONIDINE HYDROCHLORIDE 0.1 MG: 0.1 TABLET ORAL at 17:44

## 2017-04-07 RX ADMIN — AMLODIPINE BESYLATE 10 MG: 10 TABLET ORAL at 16:24

## 2017-04-07 RX ADMIN — SODIUM POLYSTYRENE SULFONATE 15 G: 15 SUSPENSION ORAL; RECTAL at 02:34

## 2017-04-07 RX ADMIN — TORSEMIDE 200 MG: 100 TABLET ORAL at 08:09

## 2017-04-07 RX ADMIN — GABAPENTIN 100 MG: 100 CAPSULE ORAL at 16:16

## 2017-04-07 RX ADMIN — GABAPENTIN 100 MG: 100 CAPSULE ORAL at 08:01

## 2017-04-07 RX ADMIN — CARVEDILOL 25 MG: 25 TABLET, FILM COATED ORAL at 16:12

## 2017-04-07 RX ADMIN — INSULIN LISPRO 8 UNITS: 100 INJECTION, SOLUTION INTRAVENOUS; SUBCUTANEOUS at 22:10

## 2017-04-07 RX ADMIN — TORSEMIDE 200 MG: 100 TABLET ORAL at 16:12

## 2017-04-07 RX ADMIN — DORZOLAMIDE HYDROCHLORIDE AND TIMOLOL MALEATE 1 DROP: 20; 5 SOLUTION/ DROPS OPHTHALMIC at 08:08

## 2017-04-07 RX ADMIN — TORSEMIDE 100 MG: 100 TABLET ORAL at 02:50

## 2017-04-07 RX ADMIN — DIPHENHYDRAMINE HCL 25 MG: 25 TABLET ORAL at 16:42

## 2017-04-07 RX ADMIN — ALBUTEROL SULFATE 10 MG: 2.5 SOLUTION RESPIRATORY (INHALATION) at 02:36

## 2017-04-07 RX ADMIN — Medication 1 CAPSULE: at 16:11

## 2017-04-07 RX ADMIN — BRIMONIDINE TARTRATE 1 DROP: 1.5 SOLUTION OPHTHALMIC at 08:08

## 2017-04-07 RX ADMIN — PREDNISOLONE ACETATE 1 DROP: 10 SUSPENSION/ DROPS OPHTHALMIC at 08:08

## 2017-04-07 RX ADMIN — SENNOSIDES 8.6 MG: 8.6 TABLET, FILM COATED ORAL at 08:17

## 2017-04-07 RX ADMIN — DOCUSATE SODIUM 100 MG: 100 CAPSULE, LIQUID FILLED ORAL at 17:44

## 2017-04-07 RX ADMIN — PANTOPRAZOLE SODIUM 40 MG: 40 TABLET, DELAYED RELEASE ORAL at 05:30

## 2017-04-07 RX ADMIN — PREDNISOLONE ACETATE 1 DROP: 10 SUSPENSION/ DROPS OPHTHALMIC at 17:48

## 2017-04-07 RX ADMIN — INSULIN LISPRO 8 UNITS: 100 INJECTION, SOLUTION INTRAVENOUS; SUBCUTANEOUS at 08:18

## 2017-04-07 RX ADMIN — BRIMONIDINE TARTRATE 1 DROP: 1.5 SOLUTION OPHTHALMIC at 22:08

## 2017-04-07 RX ADMIN — PANTOPRAZOLE SODIUM 40 MG: 40 TABLET, DELAYED RELEASE ORAL at 16:24

## 2017-04-07 RX ADMIN — IODIXANOL 90 ML: 320 INJECTION, SOLUTION INTRAVASCULAR at 00:02

## 2017-04-07 RX ADMIN — LEVOTHYROXINE SODIUM 50 MCG: 50 TABLET ORAL at 05:30

## 2017-04-07 RX ADMIN — PAROXETINE HYDROCHLORIDE 20 MG: 20 TABLET, FILM COATED ORAL at 08:33

## 2017-04-07 RX ADMIN — DOCUSATE SODIUM 100 MG: 100 CAPSULE, LIQUID FILLED ORAL at 08:17

## 2017-04-07 RX ADMIN — DEXTROSE MONOHYDRATE 25 ML: 25 INJECTION, SOLUTION INTRAVENOUS at 02:41

## 2017-04-08 ENCOUNTER — APPOINTMENT (INPATIENT)
Dept: DIALYSIS | Facility: HOSPITAL | Age: 52
DRG: 640 | End: 2017-04-08
Attending: INTERNAL MEDICINE
Payer: MEDICARE

## 2017-04-08 LAB
ANION GAP SERPL CALCULATED.3IONS-SCNC: 9 MMOL/L (ref 4–13)
BASOPHILS # BLD AUTO: 0.02 THOUSANDS/ΜL (ref 0–0.1)
BASOPHILS NFR BLD AUTO: 0 % (ref 0–1)
BUN SERPL-MCNC: 54 MG/DL (ref 5–25)
CALCIUM SERPL-MCNC: 7.7 MG/DL (ref 8.3–10.1)
CHLORIDE SERPL-SCNC: 100 MMOL/L (ref 100–108)
CO2 SERPL-SCNC: 30 MMOL/L (ref 21–32)
CREAT SERPL-MCNC: 6.14 MG/DL (ref 0.6–1.3)
EOSINOPHIL # BLD AUTO: 0.2 THOUSAND/ΜL (ref 0–0.61)
EOSINOPHIL NFR BLD AUTO: 3 % (ref 0–6)
ERYTHROCYTE [DISTWIDTH] IN BLOOD BY AUTOMATED COUNT: 15.4 % (ref 11.6–15.1)
GFR SERPL CREATININE-BSD FRML MDRD: 7.2 ML/MIN/1.73SQ M
GLUCOSE SERPL-MCNC: 123 MG/DL (ref 65–140)
GLUCOSE SERPL-MCNC: 191 MG/DL (ref 65–140)
GLUCOSE SERPL-MCNC: 282 MG/DL (ref 65–140)
GLUCOSE SERPL-MCNC: 55 MG/DL (ref 65–140)
GLUCOSE SERPL-MCNC: 59 MG/DL (ref 65–140)
GLUCOSE SERPL-MCNC: 77 MG/DL (ref 65–140)
HCT VFR BLD AUTO: 24.6 % (ref 34.8–46.1)
HGB BLD-MCNC: 8 G/DL (ref 11.5–15.4)
LYMPHOCYTES # BLD AUTO: 0.88 THOUSANDS/ΜL (ref 0.6–4.47)
LYMPHOCYTES NFR BLD AUTO: 15 % (ref 14–44)
MCH RBC QN AUTO: 30.3 PG (ref 26.8–34.3)
MCHC RBC AUTO-ENTMCNC: 32.5 G/DL (ref 31.4–37.4)
MCV RBC AUTO: 93 FL (ref 82–98)
MONOCYTES # BLD AUTO: 0.57 THOUSAND/ΜL (ref 0.17–1.22)
MONOCYTES NFR BLD AUTO: 10 % (ref 4–12)
NEUTROPHILS # BLD AUTO: 4.24 THOUSANDS/ΜL (ref 1.85–7.62)
NEUTS SEG NFR BLD AUTO: 72 % (ref 43–75)
NRBC BLD AUTO-RTO: 0 /100 WBCS
PLATELET # BLD AUTO: 161 THOUSANDS/UL (ref 149–390)
PMV BLD AUTO: 10.3 FL (ref 8.9–12.7)
POTASSIUM SERPL-SCNC: 4.7 MMOL/L (ref 3.5–5.3)
RBC # BLD AUTO: 2.64 MILLION/UL (ref 3.81–5.12)
SODIUM SERPL-SCNC: 139 MMOL/L (ref 136–145)
WBC # BLD AUTO: 5.93 THOUSAND/UL (ref 4.31–10.16)

## 2017-04-08 PROCEDURE — 80048 BASIC METABOLIC PNL TOTAL CA: CPT | Performed by: INTERNAL MEDICINE

## 2017-04-08 PROCEDURE — 85025 COMPLETE CBC W/AUTO DIFF WBC: CPT | Performed by: INTERNAL MEDICINE

## 2017-04-08 PROCEDURE — 82948 REAGENT STRIP/BLOOD GLUCOSE: CPT

## 2017-04-08 RX ORDER — LORAZEPAM 0.5 MG/1
0.5 TABLET ORAL EVERY 4 HOURS PRN
Status: DISCONTINUED | OUTPATIENT
Start: 2017-04-08 | End: 2017-04-09 | Stop reason: HOSPADM

## 2017-04-08 RX ORDER — BRIMONIDINE TARTRATE 0.15 %
1 DROPS OPHTHALMIC (EYE) 3 TIMES DAILY
Status: DISCONTINUED | OUTPATIENT
Start: 2017-04-08 | End: 2017-04-09 | Stop reason: HOSPADM

## 2017-04-08 RX ORDER — SEVELAMER HYDROCHLORIDE 800 MG/1
800 TABLET, FILM COATED ORAL
Status: DISCONTINUED | OUTPATIENT
Start: 2017-04-08 | End: 2017-04-09 | Stop reason: HOSPADM

## 2017-04-08 RX ORDER — METOCLOPRAMIDE 5 MG/1
5 TABLET ORAL EVERY 6 HOURS PRN
Status: DISCONTINUED | OUTPATIENT
Start: 2017-04-08 | End: 2017-04-09

## 2017-04-08 RX ORDER — PREDNISOLONE ACETATE 10 MG/ML
1 SUSPENSION/ DROPS OPHTHALMIC 4 TIMES DAILY
Status: DISCONTINUED | OUTPATIENT
Start: 2017-04-08 | End: 2017-04-09 | Stop reason: HOSPADM

## 2017-04-08 RX ORDER — LORAZEPAM 0.5 MG/1
0.5 TABLET ORAL EVERY 8 HOURS PRN
Status: DISCONTINUED | OUTPATIENT
Start: 2017-04-08 | End: 2017-04-08

## 2017-04-08 RX ORDER — DORZOLAMIDE HYDROCHLORIDE AND TIMOLOL MALEATE 20; 5 MG/ML; MG/ML
1 SOLUTION/ DROPS OPHTHALMIC 3 TIMES DAILY
Status: DISCONTINUED | OUTPATIENT
Start: 2017-04-08 | End: 2017-04-09 | Stop reason: HOSPADM

## 2017-04-08 RX ORDER — METHAZOLAMIDE 50 MG/1
25 TABLET ORAL DAILY
Status: DISCONTINUED | OUTPATIENT
Start: 2017-04-08 | End: 2017-04-09

## 2017-04-08 RX ORDER — INSULIN GLARGINE 100 [IU]/ML
20 INJECTION, SOLUTION SUBCUTANEOUS
Status: DISCONTINUED | OUTPATIENT
Start: 2017-04-08 | End: 2017-04-09 | Stop reason: HOSPADM

## 2017-04-08 RX ORDER — METOCLOPRAMIDE 5 MG/1
5 TABLET ORAL
Status: DISCONTINUED | OUTPATIENT
Start: 2017-04-08 | End: 2017-04-08

## 2017-04-08 RX ORDER — ALPRAZOLAM 0.25 MG/1
0.25 TABLET ORAL 3 TIMES DAILY PRN
Status: DISCONTINUED | OUTPATIENT
Start: 2017-04-08 | End: 2017-04-08

## 2017-04-08 RX ADMIN — HEPARIN SODIUM 7500 UNITS: 5000 INJECTION, SOLUTION INTRAVENOUS; SUBCUTANEOUS at 23:00

## 2017-04-08 RX ADMIN — OXYCODONE HYDROCHLORIDE AND ACETAMINOPHEN 1 TABLET: 5; 325 TABLET ORAL at 06:03

## 2017-04-08 RX ADMIN — GABAPENTIN 100 MG: 100 CAPSULE ORAL at 14:08

## 2017-04-08 RX ADMIN — HEPARIN SODIUM 7500 UNITS: 5000 INJECTION, SOLUTION INTRAVENOUS; SUBCUTANEOUS at 06:03

## 2017-04-08 RX ADMIN — METOCLOPRAMIDE HYDROCHLORIDE 5 MG: 5 TABLET ORAL at 20:41

## 2017-04-08 RX ADMIN — PANTOPRAZOLE SODIUM 40 MG: 40 TABLET, DELAYED RELEASE ORAL at 17:27

## 2017-04-08 RX ADMIN — TORSEMIDE 200 MG: 100 TABLET ORAL at 17:24

## 2017-04-08 RX ADMIN — DOCUSATE SODIUM 100 MG: 100 CAPSULE, LIQUID FILLED ORAL at 14:05

## 2017-04-08 RX ADMIN — LORAZEPAM 0.5 MG: 0.5 TABLET ORAL at 18:20

## 2017-04-08 RX ADMIN — DORZOLAMIDE HYDROCHLORIDE AND TIMOLOL MALEATE 1 DROP: 20; 5 SOLUTION/ DROPS OPHTHALMIC at 17:21

## 2017-04-08 RX ADMIN — LATANOPROST 1 DROP: 50 SOLUTION OPHTHALMIC at 23:08

## 2017-04-08 RX ADMIN — GABAPENTIN 100 MG: 100 CAPSULE ORAL at 20:42

## 2017-04-08 RX ADMIN — DORZOLAMIDE HYDROCHLORIDE AND TIMOLOL MALEATE 1 DROP: 20; 5 SOLUTION/ DROPS OPHTHALMIC at 20:44

## 2017-04-08 RX ADMIN — METHAZOLAMIDE 25 MG: 50 TABLET ORAL at 17:23

## 2017-04-08 RX ADMIN — CLONIDINE HYDROCHLORIDE 0.1 MG: 0.1 TABLET ORAL at 11:07

## 2017-04-08 RX ADMIN — HEPARIN SODIUM 7500 UNITS: 5000 INJECTION, SOLUTION INTRAVENOUS; SUBCUTANEOUS at 15:06

## 2017-04-08 RX ADMIN — METOCLOPRAMIDE HYDROCHLORIDE 5 MG: 5 TABLET ORAL at 18:20

## 2017-04-08 RX ADMIN — CALCIUM CARBONATE 1000 MG: 1250 SUSPENSION ORAL at 14:04

## 2017-04-08 RX ADMIN — DIPHENHYDRAMINE HCL 25 MG: 25 TABLET ORAL at 02:50

## 2017-04-08 RX ADMIN — VITAMIN D, TAB 1000IU (100/BT) 1000 UNITS: 25 TAB at 14:05

## 2017-04-08 RX ADMIN — GABAPENTIN 100 MG: 100 CAPSULE ORAL at 17:22

## 2017-04-08 RX ADMIN — TORSEMIDE 200 MG: 100 TABLET ORAL at 14:07

## 2017-04-08 RX ADMIN — AMLODIPINE BESYLATE 10 MG: 10 TABLET ORAL at 11:06

## 2017-04-08 RX ADMIN — CARVEDILOL 25 MG: 25 TABLET, FILM COATED ORAL at 17:22

## 2017-04-08 RX ADMIN — DOCUSATE SODIUM 100 MG: 100 CAPSULE, LIQUID FILLED ORAL at 17:21

## 2017-04-08 RX ADMIN — PANTOPRAZOLE SODIUM 40 MG: 40 TABLET, DELAYED RELEASE ORAL at 06:03

## 2017-04-08 RX ADMIN — ALPRAZOLAM 0.25 MG: 0.25 TABLET ORAL at 02:50

## 2017-04-08 RX ADMIN — RENAGEL 800 MG: 800 TABLET ORAL at 20:44

## 2017-04-08 RX ADMIN — PAROXETINE HYDROCHLORIDE 20 MG: 20 TABLET, FILM COATED ORAL at 14:06

## 2017-04-08 RX ADMIN — ACETAMINOPHEN 650 MG: 325 TABLET, FILM COATED ORAL at 12:51

## 2017-04-08 RX ADMIN — PREDNISOLONE ACETATE 1 DROP: 10 SUSPENSION/ DROPS OPHTHALMIC at 23:00

## 2017-04-08 RX ADMIN — BRIMONIDINE TARTRATE 1 DROP: 1.5 SOLUTION OPHTHALMIC at 20:44

## 2017-04-08 RX ADMIN — INSULIN LISPRO 2 UNITS: 100 INJECTION, SOLUTION INTRAVENOUS; SUBCUTANEOUS at 17:26

## 2017-04-08 RX ADMIN — BRIMONIDINE TARTRATE 1 DROP: 1.5 SOLUTION OPHTHALMIC at 17:21

## 2017-04-08 RX ADMIN — LORAZEPAM 0.5 MG: 0.5 TABLET ORAL at 23:08

## 2017-04-08 RX ADMIN — INSULIN LISPRO 6 UNITS: 100 INJECTION, SOLUTION INTRAVENOUS; SUBCUTANEOUS at 23:00

## 2017-04-08 RX ADMIN — DOXERCALCIFEROL 0.5 MCG: 2 INJECTION, SOLUTION INTRAVENOUS at 10:10

## 2017-04-08 RX ADMIN — ALPRAZOLAM 0.25 MG: 0.25 TABLET ORAL at 12:01

## 2017-04-08 RX ADMIN — Medication 1 CAPSULE: at 14:04

## 2017-04-08 RX ADMIN — LEVOTHYROXINE SODIUM 50 MCG: 50 TABLET ORAL at 06:03

## 2017-04-08 RX ADMIN — PREDNISOLONE ACETATE 1 DROP: 10 SUSPENSION/ DROPS OPHTHALMIC at 17:21

## 2017-04-08 RX ADMIN — CLONIDINE HYDROCHLORIDE 0.1 MG: 0.1 TABLET ORAL at 17:22

## 2017-04-08 RX ADMIN — INSULIN GLARGINE 20 UNITS: 100 INJECTION, SOLUTION SUBCUTANEOUS at 23:00

## 2017-04-09 VITALS
SYSTOLIC BLOOD PRESSURE: 164 MMHG | HEIGHT: 66 IN | HEART RATE: 80 BPM | BODY MASS INDEX: 42.27 KG/M2 | WEIGHT: 263.01 LBS | TEMPERATURE: 98.3 F | RESPIRATION RATE: 18 BRPM | OXYGEN SATURATION: 96 % | DIASTOLIC BLOOD PRESSURE: 72 MMHG

## 2017-04-09 LAB
ANION GAP SERPL CALCULATED.3IONS-SCNC: 7 MMOL/L (ref 4–13)
BASOPHILS # BLD AUTO: 0.03 THOUSANDS/ΜL (ref 0–0.1)
BASOPHILS NFR BLD AUTO: 1 % (ref 0–1)
BUN SERPL-MCNC: 46 MG/DL (ref 5–25)
CALCIUM SERPL-MCNC: 8.8 MG/DL (ref 8.3–10.1)
CHLORIDE SERPL-SCNC: 99 MMOL/L (ref 100–108)
CO2 SERPL-SCNC: 32 MMOL/L (ref 21–32)
CREAT SERPL-MCNC: 5.73 MG/DL (ref 0.6–1.3)
EOSINOPHIL # BLD AUTO: 0.21 THOUSAND/ΜL (ref 0–0.61)
EOSINOPHIL NFR BLD AUTO: 4 % (ref 0–6)
ERYTHROCYTE [DISTWIDTH] IN BLOOD BY AUTOMATED COUNT: 14.9 % (ref 11.6–15.1)
GFR SERPL CREATININE-BSD FRML MDRD: 7.8 ML/MIN/1.73SQ M
GLUCOSE SERPL-MCNC: 237 MG/DL (ref 65–140)
GLUCOSE SERPL-MCNC: 50 MG/DL (ref 65–140)
GLUCOSE SERPL-MCNC: 69 MG/DL (ref 65–140)
HCT VFR BLD AUTO: 31.6 % (ref 34.8–46.1)
HGB BLD-MCNC: 10.2 G/DL (ref 11.5–15.4)
LYMPHOCYTES # BLD AUTO: 0.79 THOUSANDS/ΜL (ref 0.6–4.47)
LYMPHOCYTES NFR BLD AUTO: 15 % (ref 14–44)
MCH RBC QN AUTO: 30.4 PG (ref 26.8–34.3)
MCHC RBC AUTO-ENTMCNC: 32.3 G/DL (ref 31.4–37.4)
MCV RBC AUTO: 94 FL (ref 82–98)
MONOCYTES # BLD AUTO: 0.82 THOUSAND/ΜL (ref 0.17–1.22)
MONOCYTES NFR BLD AUTO: 15 % (ref 4–12)
NEUTROPHILS # BLD AUTO: 3.53 THOUSANDS/ΜL (ref 1.85–7.62)
NEUTS SEG NFR BLD AUTO: 65 % (ref 43–75)
NRBC BLD AUTO-RTO: 0 /100 WBCS
PLATELET # BLD AUTO: 184 THOUSANDS/UL (ref 149–390)
PMV BLD AUTO: 10.2 FL (ref 8.9–12.7)
POTASSIUM SERPL-SCNC: 4.3 MMOL/L (ref 3.5–5.3)
RBC # BLD AUTO: 3.35 MILLION/UL (ref 3.81–5.12)
SODIUM SERPL-SCNC: 138 MMOL/L (ref 136–145)
WBC # BLD AUTO: 5.39 THOUSAND/UL (ref 4.31–10.16)

## 2017-04-09 PROCEDURE — 82948 REAGENT STRIP/BLOOD GLUCOSE: CPT

## 2017-04-09 PROCEDURE — 85025 COMPLETE CBC W/AUTO DIFF WBC: CPT | Performed by: INTERNAL MEDICINE

## 2017-04-09 PROCEDURE — 80048 BASIC METABOLIC PNL TOTAL CA: CPT | Performed by: INTERNAL MEDICINE

## 2017-04-09 RX ORDER — METOCLOPRAMIDE 5 MG/1
5 TABLET ORAL EVERY 6 HOURS PRN
Status: DISCONTINUED | OUTPATIENT
Start: 2017-04-09 | End: 2017-04-09 | Stop reason: HOSPADM

## 2017-04-09 RX ORDER — ALBUTEROL SULFATE 90 UG/1
2 AEROSOL, METERED RESPIRATORY (INHALATION) EVERY 4 HOURS PRN
Qty: 1 INHALER | Refills: 0 | Status: SHIPPED | OUTPATIENT
Start: 2017-04-09 | End: 2017-05-09

## 2017-04-09 RX ORDER — LORAZEPAM 0.5 MG/1
0.5 TABLET ORAL EVERY 12 HOURS
Qty: 14 TABLET | Refills: 0 | Status: SHIPPED | OUTPATIENT
Start: 2017-04-09 | End: 2017-08-06

## 2017-04-09 RX ORDER — OXYCODONE HYDROCHLORIDE AND ACETAMINOPHEN 5; 325 MG/1; MG/1
1 TABLET ORAL EVERY 8 HOURS PRN
Qty: 10 TABLET | Refills: 0 | Status: SHIPPED | OUTPATIENT
Start: 2017-04-09 | End: 2017-07-05 | Stop reason: DRUGHIGH

## 2017-04-09 RX ORDER — CHOLECALCIFEROL (VITAMIN D3) 25 MCG
2000 CAPSULE ORAL DAILY
Qty: 30 CAPSULE | Refills: 0 | Status: SHIPPED | OUTPATIENT
Start: 2017-04-09 | End: 2018-01-30

## 2017-04-09 RX ORDER — WARFARIN SODIUM 5 MG/1
5 TABLET ORAL
Qty: 30 TABLET | Refills: 0 | Status: SHIPPED | OUTPATIENT
Start: 2017-04-09 | End: 2018-01-30

## 2017-04-09 RX ORDER — SEVELAMER HYDROCHLORIDE 800 MG/1
800 TABLET, FILM COATED ORAL
Qty: 90 TABLET | Refills: 0 | Status: SHIPPED | OUTPATIENT
Start: 2017-04-09 | End: 2018-01-30

## 2017-04-09 RX ORDER — B COMPLEX, C NO.20/FOLIC ACID 1 MG
1 CAPSULE ORAL DAILY
Qty: 30 CAPSULE | Refills: 0 | Status: SHIPPED | OUTPATIENT
Start: 2017-04-09 | End: 2018-01-30

## 2017-04-09 RX ADMIN — TORSEMIDE 200 MG: 100 TABLET ORAL at 08:00

## 2017-04-09 RX ADMIN — PREDNISOLONE ACETATE 1 DROP: 10 SUSPENSION/ DROPS OPHTHALMIC at 11:32

## 2017-04-09 RX ADMIN — BRIMONIDINE TARTRATE 1 DROP: 1.5 SOLUTION OPHTHALMIC at 09:13

## 2017-04-09 RX ADMIN — INSULIN LISPRO 4 UNITS: 100 INJECTION, SOLUTION INTRAVENOUS; SUBCUTANEOUS at 11:39

## 2017-04-09 RX ADMIN — SENNOSIDES 8.6 MG: 8.6 TABLET, FILM COATED ORAL at 09:10

## 2017-04-09 RX ADMIN — HEPARIN SODIUM 7500 UNITS: 5000 INJECTION, SOLUTION INTRAVENOUS; SUBCUTANEOUS at 05:11

## 2017-04-09 RX ADMIN — PANTOPRAZOLE SODIUM 40 MG: 40 TABLET, DELAYED RELEASE ORAL at 05:12

## 2017-04-09 RX ADMIN — DOCUSATE SODIUM 100 MG: 100 CAPSULE, LIQUID FILLED ORAL at 09:10

## 2017-04-09 RX ADMIN — PREDNISOLONE ACETATE 1 DROP: 10 SUSPENSION/ DROPS OPHTHALMIC at 09:16

## 2017-04-09 RX ADMIN — DORZOLAMIDE HYDROCHLORIDE AND TIMOLOL MALEATE 1 DROP: 20; 5 SOLUTION/ DROPS OPHTHALMIC at 09:15

## 2017-04-09 RX ADMIN — RENAGEL 800 MG: 800 TABLET ORAL at 11:32

## 2017-04-09 RX ADMIN — AMLODIPINE BESYLATE 10 MG: 10 TABLET ORAL at 09:10

## 2017-04-09 RX ADMIN — OXYCODONE HYDROCHLORIDE AND ACETAMINOPHEN 1 TABLET: 5; 325 TABLET ORAL at 08:05

## 2017-04-09 RX ADMIN — CLONIDINE HYDROCHLORIDE 0.1 MG: 0.1 TABLET ORAL at 09:10

## 2017-04-09 RX ADMIN — CARVEDILOL 25 MG: 25 TABLET, FILM COATED ORAL at 08:00

## 2017-04-09 RX ADMIN — Medication 1 CAPSULE: at 09:12

## 2017-04-09 RX ADMIN — LEVOTHYROXINE SODIUM 50 MCG: 50 TABLET ORAL at 05:11

## 2017-04-09 RX ADMIN — PAROXETINE HYDROCHLORIDE 20 MG: 20 TABLET, FILM COATED ORAL at 09:15

## 2017-04-09 RX ADMIN — RENAGEL 800 MG: 800 TABLET ORAL at 08:00

## 2017-04-09 RX ADMIN — VITAMIN D, TAB 1000IU (100/BT) 1000 UNITS: 25 TAB at 09:10

## 2017-04-09 RX ADMIN — CALCIUM CARBONATE 1000 MG: 1250 SUSPENSION ORAL at 09:15

## 2017-04-09 RX ADMIN — GABAPENTIN 100 MG: 100 CAPSULE ORAL at 09:10

## 2017-04-13 ENCOUNTER — APPOINTMENT (OUTPATIENT)
Dept: LAB | Facility: CLINIC | Age: 52
End: 2017-04-13
Payer: MEDICARE

## 2017-04-13 ENCOUNTER — TRANSCRIBE ORDERS (OUTPATIENT)
Dept: LAB | Facility: CLINIC | Age: 52
End: 2017-04-13

## 2017-04-13 DIAGNOSIS — Z79.01 LONG TERM (CURRENT) USE OF ANTICOAGULANTS: ICD-10-CM

## 2017-04-13 DIAGNOSIS — I26.99 PULMONARY INFARCTION (HCC): Primary | ICD-10-CM

## 2017-04-13 DIAGNOSIS — I26.99 PULMONARY INFARCTION (HCC): ICD-10-CM

## 2017-04-13 LAB
INR PPP: 1.03 (ref 0.86–1.16)
PROTHROMBIN TIME: 13.6 SECONDS (ref 12–14.3)

## 2017-04-13 PROCEDURE — 85610 PROTHROMBIN TIME: CPT

## 2017-04-13 PROCEDURE — 36415 COLL VENOUS BLD VENIPUNCTURE: CPT

## 2017-05-04 ENCOUNTER — APPOINTMENT (OUTPATIENT)
Dept: LAB | Facility: CLINIC | Age: 52
End: 2017-05-04
Payer: MEDICARE

## 2017-05-04 DIAGNOSIS — Z79.01 LONG TERM (CURRENT) USE OF ANTICOAGULANTS: ICD-10-CM

## 2017-05-04 DIAGNOSIS — I26.99 PULMONARY INFARCTION (HCC): ICD-10-CM

## 2017-05-04 LAB
INR PPP: 2.34 (ref 0.86–1.16)
PROTHROMBIN TIME: 25.9 SECONDS (ref 12.1–14.4)

## 2017-05-04 PROCEDURE — 36415 COLL VENOUS BLD VENIPUNCTURE: CPT

## 2017-05-04 PROCEDURE — 85610 PROTHROMBIN TIME: CPT

## 2017-07-05 ENCOUNTER — APPOINTMENT (EMERGENCY)
Dept: RADIOLOGY | Facility: HOSPITAL | Age: 52
End: 2017-07-05
Payer: MEDICARE

## 2017-07-05 ENCOUNTER — HOSPITAL ENCOUNTER (EMERGENCY)
Facility: HOSPITAL | Age: 52
Discharge: HOME | End: 2017-07-05
Payer: MEDICARE

## 2017-07-05 VITALS
TEMPERATURE: 97.5 F | RESPIRATION RATE: 18 BRPM | OXYGEN SATURATION: 100 % | HEIGHT: 66 IN | SYSTOLIC BLOOD PRESSURE: 163 MMHG | DIASTOLIC BLOOD PRESSURE: 62 MMHG | HEART RATE: 70 BPM | WEIGHT: 260 LBS | BODY MASS INDEX: 41.78 KG/M2

## 2017-07-05 DIAGNOSIS — M25.561 CHRONIC PAIN OF RIGHT KNEE: Primary | ICD-10-CM

## 2017-07-05 DIAGNOSIS — G89.29 CHRONIC PAIN OF RIGHT KNEE: Primary | ICD-10-CM

## 2017-07-05 PROCEDURE — 73562 X-RAY EXAM OF KNEE 3: CPT

## 2017-07-05 PROCEDURE — 99283 EMERGENCY DEPT VISIT LOW MDM: CPT

## 2017-07-05 RX ORDER — OXYCODONE HYDROCHLORIDE AND ACETAMINOPHEN 5; 325 MG/1; MG/1
1 TABLET ORAL EVERY 4 HOURS PRN
Qty: 6 TABLET | Refills: 0 | Status: SHIPPED | OUTPATIENT
Start: 2017-07-05 | End: 2017-07-15

## 2017-07-05 RX ORDER — TRAMADOL HYDROCHLORIDE 50 MG/1
50 TABLET ORAL ONCE
Status: DISCONTINUED | OUTPATIENT
Start: 2017-07-05 | End: 2017-07-05 | Stop reason: HOSPADM

## 2017-07-27 ENCOUNTER — TRANSCRIBE ORDERS (OUTPATIENT)
Dept: LAB | Facility: CLINIC | Age: 52
End: 2017-07-27

## 2017-07-27 ENCOUNTER — APPOINTMENT (OUTPATIENT)
Dept: LAB | Facility: CLINIC | Age: 52
End: 2017-07-27
Payer: MEDICARE

## 2017-07-27 DIAGNOSIS — Z79.01 LONG TERM (CURRENT) USE OF ANTICOAGULANTS: ICD-10-CM

## 2017-07-27 DIAGNOSIS — D68.4 ACQUIRED COAGULATION FACTOR DEFICIENCY (HCC): ICD-10-CM

## 2017-07-27 DIAGNOSIS — Z79.01 LONG TERM (CURRENT) USE OF ANTICOAGULANTS: Primary | ICD-10-CM

## 2017-07-27 LAB
INR PPP: 3.98 (ref 0.86–1.16)
PROTHROMBIN TIME: 39.5 SECONDS (ref 12.1–14.4)

## 2017-07-27 PROCEDURE — 85610 PROTHROMBIN TIME: CPT

## 2017-07-27 PROCEDURE — 36415 COLL VENOUS BLD VENIPUNCTURE: CPT

## 2017-08-01 ENCOUNTER — ALLSCRIPTS OFFICE VISIT (OUTPATIENT)
Dept: OTHER | Facility: OTHER | Age: 52
End: 2017-08-01

## 2017-08-06 ENCOUNTER — APPOINTMENT (EMERGENCY)
Dept: RADIOLOGY | Facility: HOSPITAL | Age: 52
DRG: 570 | End: 2017-08-06
Payer: MEDICARE

## 2017-08-06 ENCOUNTER — HOSPITAL ENCOUNTER (EMERGENCY)
Facility: HOSPITAL | Age: 52
Discharge: HOME | DRG: 570 | End: 2017-08-06
Attending: EMERGENCY MEDICINE | Admitting: EMERGENCY MEDICINE
Payer: MEDICARE

## 2017-08-06 VITALS
OXYGEN SATURATION: 98 % | HEART RATE: 74 BPM | BODY MASS INDEX: 42.13 KG/M2 | RESPIRATION RATE: 18 BRPM | SYSTOLIC BLOOD PRESSURE: 163 MMHG | WEIGHT: 261 LBS | TEMPERATURE: 97.7 F | DIASTOLIC BLOOD PRESSURE: 96 MMHG

## 2017-08-06 DIAGNOSIS — L02.214 ABSCESS OF RIGHT GROIN: Primary | ICD-10-CM

## 2017-08-06 LAB
ANION GAP BLD CALC-SCNC: 17 MMOL/L (ref 4–13)
BUN BLD-MCNC: 46 MG/DL (ref 5–25)
CA-I BLD-SCNC: 1.01 MMOL/L (ref 1.12–1.32)
CHLORIDE BLD-SCNC: 100 MMOL/L (ref 100–108)
CREAT BLD-MCNC: 6.9 MG/DL (ref 0.6–1.3)
GFR SERPL CREATININE-BSD FRML MDRD: 6 ML/MIN/1.73SQ M
GLUCOSE SERPL-MCNC: 194 MG/DL (ref 65–140)
HCT VFR BLD CALC: 27 % (ref 34.8–46.1)
HGB BLDA-MCNC: 9.2 G/DL (ref 11.5–15.4)
PCO2 BLD: 26 MMOL/L (ref 21–32)
POTASSIUM BLD-SCNC: 5.2 MMOL/L (ref 3.5–5.3)
SODIUM BLD-SCNC: 137 MMOL/L (ref 136–145)
SPECIMEN SOURCE: ABNORMAL

## 2017-08-06 PROCEDURE — 85014 HEMATOCRIT: CPT

## 2017-08-06 PROCEDURE — 96374 THER/PROPH/DIAG INJ IV PUSH: CPT

## 2017-08-06 PROCEDURE — 99283 EMERGENCY DEPT VISIT LOW MDM: CPT

## 2017-08-06 PROCEDURE — 80047 BASIC METABLC PNL IONIZED CA: CPT

## 2017-08-06 PROCEDURE — 0Y950ZZ DRAINAGE OF RIGHT INGUINAL REGION, OPEN APPROACH: ICD-10-PCS | Performed by: EMERGENCY MEDICINE

## 2017-08-06 PROCEDURE — 72192 CT PELVIS W/O DYE: CPT

## 2017-08-06 RX ORDER — CEPHALEXIN 250 MG/1
500 CAPSULE ORAL ONCE
Status: COMPLETED | OUTPATIENT
Start: 2017-08-06 | End: 2017-08-06

## 2017-08-06 RX ORDER — LIDOCAINE HYDROCHLORIDE AND EPINEPHRINE 10; 10 MG/ML; UG/ML
10 INJECTION, SOLUTION INFILTRATION; PERINEURAL ONCE
Status: COMPLETED | OUTPATIENT
Start: 2017-08-06 | End: 2017-08-06

## 2017-08-06 RX ORDER — ONDANSETRON 2 MG/ML
4 INJECTION INTRAMUSCULAR; INTRAVENOUS ONCE
Status: DISCONTINUED | OUTPATIENT
Start: 2017-08-06 | End: 2017-08-06

## 2017-08-06 RX ORDER — OXYCODONE HYDROCHLORIDE AND ACETAMINOPHEN 5; 325 MG/1; MG/1
1 TABLET ORAL EVERY 4 HOURS PRN
Qty: 4 TABLET | Refills: 0 | Status: ON HOLD | OUTPATIENT
Start: 2017-08-06 | End: 2017-08-10

## 2017-08-06 RX ORDER — SULFAMETHOXAZOLE AND TRIMETHOPRIM 800; 160 MG/1; MG/1
1 TABLET ORAL DAILY
Qty: 7 TABLET | Refills: 0 | Status: SHIPPED | OUTPATIENT
Start: 2017-08-06 | End: 2017-08-10 | Stop reason: HOSPADM

## 2017-08-06 RX ORDER — SULFAMETHOXAZOLE AND TRIMETHOPRIM 800; 160 MG/1; MG/1
1 TABLET ORAL ONCE
Status: COMPLETED | OUTPATIENT
Start: 2017-08-06 | End: 2017-08-06

## 2017-08-06 RX ORDER — OXYCODONE HYDROCHLORIDE AND ACETAMINOPHEN 5; 325 MG/1; MG/1
1 TABLET ORAL ONCE
Status: COMPLETED | OUTPATIENT
Start: 2017-08-06 | End: 2017-08-06

## 2017-08-06 RX ORDER — LORAZEPAM 2 MG/ML
0.5 INJECTION INTRAMUSCULAR ONCE
Status: COMPLETED | OUTPATIENT
Start: 2017-08-06 | End: 2017-08-06

## 2017-08-06 RX ORDER — CEPHALEXIN 500 MG/1
500 CAPSULE ORAL EVERY 12 HOURS SCHEDULED
Qty: 20 CAPSULE | Refills: 0 | Status: SHIPPED | OUTPATIENT
Start: 2017-08-06 | End: 2017-08-10 | Stop reason: HOSPADM

## 2017-08-06 RX ADMIN — LORAZEPAM 0.5 MG: 2 INJECTION INTRAMUSCULAR; INTRAVENOUS at 15:04

## 2017-08-06 RX ADMIN — LIDOCAINE HYDROCHLORIDE,EPINEPHRINE BITARTRATE 10 ML: 10; .01 INJECTION, SOLUTION INFILTRATION; PERINEURAL at 14:20

## 2017-08-06 RX ADMIN — OXYCODONE HYDROCHLORIDE AND ACETAMINOPHEN 1 TABLET: 5; 325 TABLET ORAL at 15:06

## 2017-08-06 RX ADMIN — SULFAMETHOXAZOLE AND TRIMETHOPRIM 1 TABLET: 800; 160 TABLET ORAL at 15:11

## 2017-08-06 RX ADMIN — CEPHALEXIN 500 MG: 250 CAPSULE ORAL at 15:11

## 2017-08-06 RX ADMIN — OXYCODONE HYDROCHLORIDE AND ACETAMINOPHEN 1 TABLET: 5; 325 TABLET ORAL at 13:47

## 2017-08-08 ENCOUNTER — HOSPITAL ENCOUNTER (INPATIENT)
Facility: HOSPITAL | Age: 52
LOS: 2 days | Discharge: HOME | DRG: 570 | End: 2017-08-10
Attending: EMERGENCY MEDICINE | Admitting: FAMILY MEDICINE
Payer: MEDICARE

## 2017-08-08 DIAGNOSIS — E11.9 T2DM (TYPE 2 DIABETES MELLITUS) (HCC): ICD-10-CM

## 2017-08-08 DIAGNOSIS — Z99.2 ESRD ON HEMODIALYSIS (HCC): ICD-10-CM

## 2017-08-08 DIAGNOSIS — N18.6 ESRD ON HEMODIALYSIS (HCC): ICD-10-CM

## 2017-08-08 DIAGNOSIS — L02.214 ABSCESS OF RIGHT GROIN: Primary | ICD-10-CM

## 2017-08-08 DIAGNOSIS — Z78.9 FAILURE OF OUTPATIENT TREATMENT: ICD-10-CM

## 2017-08-08 PROBLEM — L02.91 ABSCESS: Status: ACTIVE | Noted: 2017-08-08

## 2017-08-08 LAB
ALBUMIN SERPL BCP-MCNC: 3 G/DL (ref 3.5–5)
ALP SERPL-CCNC: 140 U/L (ref 46–116)
ALT SERPL W P-5'-P-CCNC: 10 U/L (ref 12–78)
ANION GAP SERPL CALCULATED.3IONS-SCNC: 9 MMOL/L (ref 4–13)
AST SERPL W P-5'-P-CCNC: 12 U/L (ref 5–45)
BASOPHILS # BLD AUTO: 0.03 THOUSANDS/ΜL (ref 0–0.1)
BASOPHILS NFR BLD AUTO: 0 % (ref 0–1)
BILIRUB SERPL-MCNC: 0.26 MG/DL (ref 0.2–1)
BUN SERPL-MCNC: 38 MG/DL (ref 5–25)
CALCIUM SERPL-MCNC: 8.1 MG/DL (ref 8.3–10.1)
CHLORIDE SERPL-SCNC: 100 MMOL/L (ref 100–108)
CO2 SERPL-SCNC: 27 MMOL/L (ref 21–32)
CREAT SERPL-MCNC: 5.51 MG/DL (ref 0.6–1.3)
EOSINOPHIL # BLD AUTO: 0.24 THOUSAND/ΜL (ref 0–0.61)
EOSINOPHIL NFR BLD AUTO: 3 % (ref 0–6)
ERYTHROCYTE [DISTWIDTH] IN BLOOD BY AUTOMATED COUNT: 14.3 % (ref 11.6–15.1)
GFR SERPL CREATININE-BSD FRML MDRD: 8 ML/MIN/1.73SQ M
GLUCOSE SERPL-MCNC: 210 MG/DL (ref 65–140)
HCT VFR BLD AUTO: 27.6 % (ref 34.8–46.1)
HGB BLD-MCNC: 9.2 G/DL (ref 11.5–15.4)
INR PPP: 3.42 (ref 0.86–1.16)
LIPASE SERPL-CCNC: 110 U/L (ref 73–393)
LYMPHOCYTES # BLD AUTO: 0.98 THOUSANDS/ΜL (ref 0.6–4.47)
LYMPHOCYTES NFR BLD AUTO: 12 % (ref 14–44)
MCH RBC QN AUTO: 30.8 PG (ref 26.8–34.3)
MCHC RBC AUTO-ENTMCNC: 33.3 G/DL (ref 31.4–37.4)
MCV RBC AUTO: 92 FL (ref 82–98)
MONOCYTES # BLD AUTO: 0.44 THOUSAND/ΜL (ref 0.17–1.22)
MONOCYTES NFR BLD AUTO: 5 % (ref 4–12)
NEUTROPHILS # BLD AUTO: 6.51 THOUSANDS/ΜL (ref 1.85–7.62)
NEUTS SEG NFR BLD AUTO: 80 % (ref 43–75)
NRBC BLD AUTO-RTO: 0 /100 WBCS
PLATELET # BLD AUTO: 212 THOUSANDS/UL (ref 149–390)
PMV BLD AUTO: 10.1 FL (ref 8.9–12.7)
POTASSIUM SERPL-SCNC: 4.6 MMOL/L (ref 3.5–5.3)
PROT SERPL-MCNC: 7.3 G/DL (ref 6.4–8.2)
PROTHROMBIN TIME: 35 SECONDS (ref 12.1–14.4)
RBC # BLD AUTO: 2.99 MILLION/UL (ref 3.81–5.12)
SODIUM SERPL-SCNC: 136 MMOL/L (ref 136–145)
WBC # BLD AUTO: 8.23 THOUSAND/UL (ref 4.31–10.16)

## 2017-08-08 PROCEDURE — 96365 THER/PROPH/DIAG IV INF INIT: CPT

## 2017-08-08 PROCEDURE — 85610 PROTHROMBIN TIME: CPT | Performed by: PHYSICIAN ASSISTANT

## 2017-08-08 PROCEDURE — 85025 COMPLETE CBC W/AUTO DIFF WBC: CPT | Performed by: EMERGENCY MEDICINE

## 2017-08-08 PROCEDURE — 99284 EMERGENCY DEPT VISIT MOD MDM: CPT

## 2017-08-08 PROCEDURE — 96375 TX/PRO/DX INJ NEW DRUG ADDON: CPT

## 2017-08-08 PROCEDURE — 87040 BLOOD CULTURE FOR BACTERIA: CPT | Performed by: EMERGENCY MEDICINE

## 2017-08-08 PROCEDURE — 36415 COLL VENOUS BLD VENIPUNCTURE: CPT | Performed by: EMERGENCY MEDICINE

## 2017-08-08 PROCEDURE — 80053 COMPREHEN METABOLIC PANEL: CPT | Performed by: EMERGENCY MEDICINE

## 2017-08-08 PROCEDURE — 83690 ASSAY OF LIPASE: CPT | Performed by: EMERGENCY MEDICINE

## 2017-08-08 RX ORDER — CARVEDILOL 25 MG/1
25 TABLET ORAL 2 TIMES DAILY WITH MEALS
Status: DISCONTINUED | OUTPATIENT
Start: 2017-08-09 | End: 2017-08-10 | Stop reason: HOSPADM

## 2017-08-08 RX ORDER — INSULIN GLARGINE 100 [IU]/ML
20 INJECTION, SOLUTION SUBCUTANEOUS EVERY MORNING
Status: DISCONTINUED | OUTPATIENT
Start: 2017-08-09 | End: 2017-08-09

## 2017-08-08 RX ORDER — LORAZEPAM 2 MG/ML
0.5 INJECTION INTRAMUSCULAR ONCE
Status: COMPLETED | OUTPATIENT
Start: 2017-08-08 | End: 2017-08-08

## 2017-08-08 RX ORDER — OXYCODONE HYDROCHLORIDE AND ACETAMINOPHEN 5; 325 MG/1; MG/1
1 TABLET ORAL ONCE
Status: COMPLETED | OUTPATIENT
Start: 2017-08-09 | End: 2017-08-09

## 2017-08-08 RX ORDER — ACETAMINOPHEN 325 MG/1
650 TABLET ORAL ONCE
Status: DISCONTINUED | OUTPATIENT
Start: 2017-08-08 | End: 2017-08-08

## 2017-08-08 RX ORDER — ACETAMINOPHEN 325 MG/1
650 TABLET ORAL EVERY 6 HOURS PRN
Status: DISCONTINUED | OUTPATIENT
Start: 2017-08-08 | End: 2017-08-10 | Stop reason: HOSPADM

## 2017-08-08 RX ORDER — TORSEMIDE 100 MG/1
200 TABLET ORAL
Status: DISCONTINUED | OUTPATIENT
Start: 2017-08-09 | End: 2017-08-10 | Stop reason: HOSPADM

## 2017-08-08 RX ORDER — TRAMADOL HYDROCHLORIDE 50 MG/1
50 TABLET ORAL EVERY 12 HOURS PRN
Status: DISCONTINUED | OUTPATIENT
Start: 2017-08-08 | End: 2017-08-10 | Stop reason: HOSPADM

## 2017-08-08 RX ORDER — ALPRAZOLAM 0.25 MG/1
0.12 TABLET ORAL EVERY 4 HOURS PRN
Status: DISCONTINUED | OUTPATIENT
Start: 2017-08-08 | End: 2017-08-10 | Stop reason: HOSPADM

## 2017-08-08 RX ORDER — SENNOSIDES 8.6 MG
1 TABLET ORAL
Status: DISCONTINUED | OUTPATIENT
Start: 2017-08-08 | End: 2017-08-10 | Stop reason: HOSPADM

## 2017-08-08 RX ORDER — CALCIUM CARBONATE 200(500)MG
1000 TABLET,CHEWABLE ORAL DAILY PRN
Status: DISCONTINUED | OUTPATIENT
Start: 2017-08-08 | End: 2017-08-10 | Stop reason: HOSPADM

## 2017-08-08 RX ORDER — MELATONIN
1000 DAILY
Status: DISCONTINUED | OUTPATIENT
Start: 2017-08-09 | End: 2017-08-10 | Stop reason: HOSPADM

## 2017-08-08 RX ORDER — DORZOLAMIDE HYDROCHLORIDE AND TIMOLOL MALEATE 20; 5 MG/ML; MG/ML
1 SOLUTION/ DROPS OPHTHALMIC 2 TIMES DAILY
Status: DISCONTINUED | OUTPATIENT
Start: 2017-08-09 | End: 2017-08-10 | Stop reason: HOSPADM

## 2017-08-08 RX ORDER — CLONIDINE HYDROCHLORIDE 0.1 MG/1
0.1 TABLET ORAL 2 TIMES DAILY
Status: DISCONTINUED | OUTPATIENT
Start: 2017-08-09 | End: 2017-08-10 | Stop reason: HOSPADM

## 2017-08-08 RX ORDER — LEVOTHYROXINE SODIUM 0.05 MG/1
50 TABLET ORAL
Status: DISCONTINUED | OUTPATIENT
Start: 2017-08-09 | End: 2017-08-10 | Stop reason: HOSPADM

## 2017-08-08 RX ORDER — OXYCODONE HYDROCHLORIDE AND ACETAMINOPHEN 5; 325 MG/1; MG/1
1 TABLET ORAL ONCE
Status: COMPLETED | OUTPATIENT
Start: 2017-08-08 | End: 2017-08-08

## 2017-08-08 RX ORDER — PAROXETINE HYDROCHLORIDE 20 MG/1
20 TABLET, FILM COATED ORAL EVERY MORNING
Status: DISCONTINUED | OUTPATIENT
Start: 2017-08-09 | End: 2017-08-10 | Stop reason: HOSPADM

## 2017-08-08 RX ORDER — SEVELAMER HYDROCHLORIDE 800 MG/1
800 TABLET, FILM COATED ORAL
Status: DISCONTINUED | OUTPATIENT
Start: 2017-08-09 | End: 2017-08-10 | Stop reason: HOSPADM

## 2017-08-08 RX ORDER — BRIMONIDINE TARTRATE 0.15 %
1 DROPS OPHTHALMIC (EYE) 3 TIMES DAILY
Status: DISCONTINUED | OUTPATIENT
Start: 2017-08-08 | End: 2017-08-10 | Stop reason: HOSPADM

## 2017-08-08 RX ORDER — LOSARTAN POTASSIUM 50 MG/1
100 TABLET ORAL DAILY
Status: DISCONTINUED | OUTPATIENT
Start: 2017-08-09 | End: 2017-08-10 | Stop reason: HOSPADM

## 2017-08-08 RX ORDER — PANTOPRAZOLE SODIUM 40 MG/1
40 TABLET, DELAYED RELEASE ORAL 2 TIMES DAILY
Status: DISCONTINUED | OUTPATIENT
Start: 2017-08-09 | End: 2017-08-10 | Stop reason: HOSPADM

## 2017-08-08 RX ORDER — CHOLECALCIFEROL (VITAMIN D3) 10 MCG
1 TABLET ORAL
Status: DISCONTINUED | OUTPATIENT
Start: 2017-08-09 | End: 2017-08-10 | Stop reason: HOSPADM

## 2017-08-08 RX ORDER — AMLODIPINE BESYLATE 10 MG/1
10 TABLET ORAL DAILY
Status: DISCONTINUED | OUTPATIENT
Start: 2017-08-09 | End: 2017-08-10 | Stop reason: HOSPADM

## 2017-08-08 RX ORDER — GABAPENTIN 100 MG/1
100 CAPSULE ORAL 3 TIMES DAILY
Status: DISCONTINUED | OUTPATIENT
Start: 2017-08-08 | End: 2017-08-10 | Stop reason: HOSPADM

## 2017-08-08 RX ORDER — ONDANSETRON 2 MG/ML
4 INJECTION INTRAMUSCULAR; INTRAVENOUS EVERY 6 HOURS PRN
Status: DISCONTINUED | OUTPATIENT
Start: 2017-08-08 | End: 2017-08-10 | Stop reason: HOSPADM

## 2017-08-08 RX ORDER — LATANOPROST 50 UG/ML
1 SOLUTION/ DROPS OPHTHALMIC
Status: DISCONTINUED | OUTPATIENT
Start: 2017-08-08 | End: 2017-08-10 | Stop reason: HOSPADM

## 2017-08-08 RX ORDER — PREDNISOLONE ACETATE 10 MG/ML
1 SUSPENSION/ DROPS OPHTHALMIC 4 TIMES DAILY
Status: DISCONTINUED | OUTPATIENT
Start: 2017-08-08 | End: 2017-08-10 | Stop reason: HOSPADM

## 2017-08-08 RX ADMIN — OXYCODONE HYDROCHLORIDE AND ACETAMINOPHEN 1 TABLET: 5; 325 TABLET ORAL at 16:09

## 2017-08-08 RX ADMIN — VANCOMYCIN HYDROCHLORIDE 1750 MG: 1 INJECTION, POWDER, LYOPHILIZED, FOR SOLUTION INTRAVENOUS at 17:35

## 2017-08-08 RX ADMIN — LORAZEPAM 0.5 MG: 2 INJECTION INTRAMUSCULAR; INTRAVENOUS at 17:23

## 2017-08-09 LAB
ANION GAP SERPL CALCULATED.3IONS-SCNC: 10 MMOL/L (ref 4–13)
BUN SERPL-MCNC: 54 MG/DL (ref 5–25)
CALCIUM SERPL-MCNC: 7.8 MG/DL (ref 8.3–10.1)
CHLORIDE SERPL-SCNC: 102 MMOL/L (ref 100–108)
CO2 SERPL-SCNC: 25 MMOL/L (ref 21–32)
CREAT SERPL-MCNC: 6.62 MG/DL (ref 0.6–1.3)
ERYTHROCYTE [DISTWIDTH] IN BLOOD BY AUTOMATED COUNT: 14.3 % (ref 11.6–15.1)
GFR SERPL CREATININE-BSD FRML MDRD: 7 ML/MIN/1.73SQ M
GLUCOSE SERPL-MCNC: 148 MG/DL (ref 65–140)
GLUCOSE SERPL-MCNC: 188 MG/DL (ref 65–140)
GLUCOSE SERPL-MCNC: 198 MG/DL (ref 65–140)
GLUCOSE SERPL-MCNC: 209 MG/DL (ref 65–140)
GLUCOSE SERPL-MCNC: 265 MG/DL (ref 65–140)
GLUCOSE SERPL-MCNC: 280 MG/DL (ref 65–140)
HCT VFR BLD AUTO: 24.6 % (ref 34.8–46.1)
HCT VFR BLD AUTO: 25.7 % (ref 34.8–46.1)
HGB BLD-MCNC: 8.2 G/DL (ref 11.5–15.4)
HGB BLD-MCNC: 8.4 G/DL (ref 11.5–15.4)
MCH RBC QN AUTO: 30.9 PG (ref 26.8–34.3)
MCHC RBC AUTO-ENTMCNC: 33.3 G/DL (ref 31.4–37.4)
MCV RBC AUTO: 93 FL (ref 82–98)
PLATELET # BLD AUTO: 180 THOUSANDS/UL (ref 149–390)
PMV BLD AUTO: 9.7 FL (ref 8.9–12.7)
POTASSIUM SERPL-SCNC: 5.1 MMOL/L (ref 3.5–5.3)
RBC # BLD AUTO: 2.65 MILLION/UL (ref 3.81–5.12)
SODIUM SERPL-SCNC: 137 MMOL/L (ref 136–145)
WBC # BLD AUTO: 6.18 THOUSAND/UL (ref 4.31–10.16)

## 2017-08-09 PROCEDURE — 85027 COMPLETE CBC AUTOMATED: CPT | Performed by: PHYSICIAN ASSISTANT

## 2017-08-09 PROCEDURE — 0JBC0ZZ EXCISION OF PELVIC REGION SUBCUTANEOUS TISSUE AND FASCIA, OPEN APPROACH: ICD-10-PCS | Performed by: FAMILY MEDICINE

## 2017-08-09 PROCEDURE — 85018 HEMOGLOBIN: CPT | Performed by: INTERNAL MEDICINE

## 2017-08-09 PROCEDURE — 85014 HEMATOCRIT: CPT | Performed by: INTERNAL MEDICINE

## 2017-08-09 PROCEDURE — 82948 REAGENT STRIP/BLOOD GLUCOSE: CPT

## 2017-08-09 PROCEDURE — 80048 BASIC METABOLIC PNL TOTAL CA: CPT | Performed by: PHYSICIAN ASSISTANT

## 2017-08-09 RX ORDER — INSULIN GLARGINE 100 [IU]/ML
25 INJECTION, SOLUTION SUBCUTANEOUS EVERY MORNING
Status: DISCONTINUED | OUTPATIENT
Start: 2017-08-10 | End: 2017-08-10 | Stop reason: HOSPADM

## 2017-08-09 RX ORDER — LIDOCAINE HYDROCHLORIDE 10 MG/ML
5 INJECTION, SOLUTION EPIDURAL; INFILTRATION; INTRACAUDAL; PERINEURAL ONCE
Status: COMPLETED | OUTPATIENT
Start: 2017-08-09 | End: 2017-08-09

## 2017-08-09 RX ORDER — OXYCODONE HYDROCHLORIDE AND ACETAMINOPHEN 5; 325 MG/1; MG/1
2 TABLET ORAL EVERY 4 HOURS PRN
Status: DISCONTINUED | OUTPATIENT
Start: 2017-08-09 | End: 2017-08-10 | Stop reason: HOSPADM

## 2017-08-09 RX ORDER — LIDOCAINE HYDROCHLORIDE 10 MG/ML
INJECTION, SOLUTION EPIDURAL; INFILTRATION; INTRACAUDAL; PERINEURAL
Status: COMPLETED
Start: 2017-08-09 | End: 2017-08-09

## 2017-08-09 RX ORDER — ONDANSETRON 4 MG/1
4 TABLET, ORALLY DISINTEGRATING ORAL EVERY 6 HOURS PRN
Status: DISCONTINUED | OUTPATIENT
Start: 2017-08-09 | End: 2017-08-10 | Stop reason: HOSPADM

## 2017-08-09 RX ORDER — OXYCODONE HYDROCHLORIDE AND ACETAMINOPHEN 5; 325 MG/1; MG/1
2 TABLET ORAL EVERY 4 HOURS PRN
Status: DISCONTINUED | OUTPATIENT
Start: 2017-08-09 | End: 2017-08-09

## 2017-08-09 RX ADMIN — ALPRAZOLAM 0.12 MG: 0.25 TABLET ORAL at 21:46

## 2017-08-09 RX ADMIN — PREDNISOLONE ACETATE 1 DROP: 10 SUSPENSION/ DROPS OPHTHALMIC at 12:16

## 2017-08-09 RX ADMIN — RENAGEL 800 MG: 800 TABLET ORAL at 08:18

## 2017-08-09 RX ADMIN — TORSEMIDE 200 MG: 100 TABLET ORAL at 08:17

## 2017-08-09 RX ADMIN — AMLODIPINE BESYLATE 10 MG: 10 TABLET ORAL at 08:18

## 2017-08-09 RX ADMIN — DORZOLAMIDE HYDROCHLORIDE AND TIMOLOL MALEATE 1 DROP: 20; 5 SOLUTION/ DROPS OPHTHALMIC at 18:40

## 2017-08-09 RX ADMIN — INSULIN GLARGINE 20 UNITS: 100 INJECTION, SOLUTION SUBCUTANEOUS at 08:17

## 2017-08-09 RX ADMIN — PAROXETINE HYDROCHLORIDE 20 MG: 20 TABLET, FILM COATED ORAL at 08:18

## 2017-08-09 RX ADMIN — GABAPENTIN 100 MG: 100 CAPSULE ORAL at 00:01

## 2017-08-09 RX ADMIN — CARVEDILOL 25 MG: 25 TABLET, FILM COATED ORAL at 08:18

## 2017-08-09 RX ADMIN — GABAPENTIN 100 MG: 100 CAPSULE ORAL at 21:42

## 2017-08-09 RX ADMIN — GABAPENTIN 100 MG: 100 CAPSULE ORAL at 08:18

## 2017-08-09 RX ADMIN — LATANOPROST 1 DROP: 50 SOLUTION OPHTHALMIC at 00:56

## 2017-08-09 RX ADMIN — LIDOCAINE HYDROCHLORIDE 5 ML: 10 INJECTION, SOLUTION EPIDURAL; INFILTRATION; INTRACAUDAL; PERINEURAL at 11:15

## 2017-08-09 RX ADMIN — PREDNISOLONE ACETATE 1 DROP: 10 SUSPENSION/ DROPS OPHTHALMIC at 18:40

## 2017-08-09 RX ADMIN — PREDNISOLONE ACETATE 1 DROP: 10 SUSPENSION/ DROPS OPHTHALMIC at 00:56

## 2017-08-09 RX ADMIN — PREDNISOLONE ACETATE 1 DROP: 10 SUSPENSION/ DROPS OPHTHALMIC at 08:18

## 2017-08-09 RX ADMIN — CLONIDINE HYDROCHLORIDE 0.1 MG: 0.1 TABLET ORAL at 08:18

## 2017-08-09 RX ADMIN — RENAGEL 800 MG: 800 TABLET ORAL at 18:42

## 2017-08-09 RX ADMIN — LEVOTHYROXINE SODIUM 50 MCG: 50 TABLET ORAL at 05:26

## 2017-08-09 RX ADMIN — LIDOCAINE HYDROCHLORIDE: 10 INJECTION, SOLUTION EPIDURAL; INFILTRATION; INTRACAUDAL; PERINEURAL at 11:07

## 2017-08-09 RX ADMIN — ASCORBIC ACID, FOLIC ACID, NIACIN, THIAMINE, RIBOFLAVIN, PYRIDOXINE, CYANOCOBALAMIN, PANTOTHENIC ACID, BIOTIN 1 CAPSULE: 100; 1.5; 1.7; 20; 10; 1; 6; 150; 5 CAPSULE, LIQUID FILLED ORAL at 18:45

## 2017-08-09 RX ADMIN — BRIMONIDINE TARTRATE 1 DROP: 1.5 SOLUTION OPHTHALMIC at 21:37

## 2017-08-09 RX ADMIN — CLONIDINE HYDROCHLORIDE 0.1 MG: 0.1 TABLET ORAL at 18:37

## 2017-08-09 RX ADMIN — INSULIN LISPRO 2 UNITS: 100 INJECTION, SOLUTION INTRAVENOUS; SUBCUTANEOUS at 00:06

## 2017-08-09 RX ADMIN — ONDANSETRON 4 MG: 4 TABLET, ORALLY DISINTEGRATING ORAL at 14:46

## 2017-08-09 RX ADMIN — INSULIN LISPRO 2 UNITS: 100 INJECTION, SOLUTION INTRAVENOUS; SUBCUTANEOUS at 08:17

## 2017-08-09 RX ADMIN — OXYCODONE HYDROCHLORIDE AND ACETAMINOPHEN 1 TABLET: 5; 325 TABLET ORAL at 00:16

## 2017-08-09 RX ADMIN — ALPRAZOLAM 0.12 MG: 0.25 TABLET ORAL at 05:29

## 2017-08-09 RX ADMIN — RENAGEL 800 MG: 800 TABLET ORAL at 12:16

## 2017-08-09 RX ADMIN — TORSEMIDE 200 MG: 100 TABLET ORAL at 18:42

## 2017-08-09 RX ADMIN — OXYCODONE HYDROCHLORIDE AND ACETAMINOPHEN 2 TABLET: 5; 325 TABLET ORAL at 12:20

## 2017-08-09 RX ADMIN — BRIMONIDINE TARTRATE 1 DROP: 1.5 SOLUTION OPHTHALMIC at 08:18

## 2017-08-09 RX ADMIN — OXYCODONE HYDROCHLORIDE AND ACETAMINOPHEN 2 TABLET: 5; 325 TABLET ORAL at 21:47

## 2017-08-09 RX ADMIN — LATANOPROST 1 DROP: 50 SOLUTION OPHTHALMIC at 22:37

## 2017-08-09 RX ADMIN — CARVEDILOL 25 MG: 25 TABLET, FILM COATED ORAL at 18:37

## 2017-08-09 RX ADMIN — BRIMONIDINE TARTRATE 1 DROP: 1.5 SOLUTION OPHTHALMIC at 00:03

## 2017-08-09 RX ADMIN — VITAMIN D, TAB 1000IU (100/BT) 1000 UNITS: 25 TAB at 08:19

## 2017-08-09 RX ADMIN — GABAPENTIN 100 MG: 100 CAPSULE ORAL at 18:36

## 2017-08-09 RX ADMIN — PREDNISOLONE ACETATE 1 DROP: 10 SUSPENSION/ DROPS OPHTHALMIC at 22:37

## 2017-08-09 RX ADMIN — BRIMONIDINE TARTRATE 1 DROP: 1.5 SOLUTION OPHTHALMIC at 18:40

## 2017-08-09 RX ADMIN — INSULIN LISPRO 1 UNITS: 100 INJECTION, SOLUTION INTRAVENOUS; SUBCUTANEOUS at 22:22

## 2017-08-09 RX ADMIN — LOSARTAN POTASSIUM 100 MG: 50 TABLET, FILM COATED ORAL at 08:17

## 2017-08-09 RX ADMIN — PANTOPRAZOLE SODIUM 40 MG: 40 TABLET, DELAYED RELEASE ORAL at 05:26

## 2017-08-09 RX ADMIN — INSULIN LISPRO 1 UNITS: 100 INJECTION, SOLUTION INTRAVENOUS; SUBCUTANEOUS at 12:15

## 2017-08-09 RX ADMIN — PANTOPRAZOLE SODIUM 40 MG: 40 TABLET, DELAYED RELEASE ORAL at 18:36

## 2017-08-09 RX ADMIN — DORZOLAMIDE HYDROCHLORIDE AND TIMOLOL MALEATE 1 DROP: 20; 5 SOLUTION/ DROPS OPHTHALMIC at 08:18

## 2017-08-10 ENCOUNTER — APPOINTMENT (INPATIENT)
Dept: DIALYSIS | Facility: HOSPITAL | Age: 52
DRG: 570 | End: 2017-08-10
Payer: MEDICARE

## 2017-08-10 VITALS
HEART RATE: 67 BPM | TEMPERATURE: 97.5 F | HEIGHT: 66 IN | SYSTOLIC BLOOD PRESSURE: 129 MMHG | DIASTOLIC BLOOD PRESSURE: 69 MMHG | BODY MASS INDEX: 41.95 KG/M2 | OXYGEN SATURATION: 98 % | RESPIRATION RATE: 16 BRPM | WEIGHT: 261 LBS

## 2017-08-10 LAB
ANION GAP SERPL CALCULATED.3IONS-SCNC: 11 MMOL/L (ref 4–13)
BUN SERPL-MCNC: 72 MG/DL (ref 5–25)
CALCIUM SERPL-MCNC: 7.9 MG/DL (ref 8.3–10.1)
CHLORIDE SERPL-SCNC: 100 MMOL/L (ref 100–108)
CO2 SERPL-SCNC: 23 MMOL/L (ref 21–32)
CREAT SERPL-MCNC: 8.72 MG/DL (ref 0.6–1.3)
ERYTHROCYTE [DISTWIDTH] IN BLOOD BY AUTOMATED COUNT: 14 % (ref 11.6–15.1)
GFR SERPL CREATININE-BSD FRML MDRD: 5 ML/MIN/1.73SQ M
GLUCOSE SERPL-MCNC: 122 MG/DL (ref 65–140)
GLUCOSE SERPL-MCNC: 131 MG/DL (ref 65–140)
GLUCOSE SERPL-MCNC: 136 MG/DL (ref 65–140)
GLUCOSE SERPL-MCNC: 161 MG/DL (ref 65–140)
HCT VFR BLD AUTO: 24.6 % (ref 34.8–46.1)
HGB BLD-MCNC: 7.9 G/DL (ref 11.5–15.4)
INR PPP: 2.52 (ref 0.86–1.16)
MCH RBC QN AUTO: 29.7 PG (ref 26.8–34.3)
MCHC RBC AUTO-ENTMCNC: 32.1 G/DL (ref 31.4–37.4)
MCV RBC AUTO: 93 FL (ref 82–98)
PLATELET # BLD AUTO: 190 THOUSANDS/UL (ref 149–390)
PMV BLD AUTO: 10.5 FL (ref 8.9–12.7)
POTASSIUM SERPL-SCNC: 5.6 MMOL/L (ref 3.5–5.3)
PROTHROMBIN TIME: 27.5 SECONDS (ref 12.1–14.4)
RBC # BLD AUTO: 2.66 MILLION/UL (ref 3.81–5.12)
SODIUM SERPL-SCNC: 134 MMOL/L (ref 136–145)
VANCOMYCIN SERPL-MCNC: 20 UG/ML
WBC # BLD AUTO: 5.79 THOUSAND/UL (ref 4.31–10.16)

## 2017-08-10 PROCEDURE — 82948 REAGENT STRIP/BLOOD GLUCOSE: CPT

## 2017-08-10 PROCEDURE — 85027 COMPLETE CBC AUTOMATED: CPT | Performed by: FAMILY MEDICINE

## 2017-08-10 PROCEDURE — 80202 ASSAY OF VANCOMYCIN: CPT | Performed by: INTERNAL MEDICINE

## 2017-08-10 PROCEDURE — 80048 BASIC METABOLIC PNL TOTAL CA: CPT | Performed by: FAMILY MEDICINE

## 2017-08-10 PROCEDURE — 5A1D00Z PERFORMANCE OF URINARY FILTRATION, SINGLE: ICD-10-PCS | Performed by: FAMILY MEDICINE

## 2017-08-10 PROCEDURE — 85610 PROTHROMBIN TIME: CPT | Performed by: FAMILY MEDICINE

## 2017-08-10 RX ORDER — ONDANSETRON 4 MG/1
4 TABLET, ORALLY DISINTEGRATING ORAL EVERY 6 HOURS PRN
Qty: 20 TABLET | Refills: 0 | Status: SHIPPED | OUTPATIENT
Start: 2017-08-10 | End: 2018-02-20

## 2017-08-10 RX ORDER — OXYCODONE HYDROCHLORIDE AND ACETAMINOPHEN 5; 325 MG/1; MG/1
1 TABLET ORAL EVERY 4 HOURS PRN
Qty: 10 TABLET | Refills: 0 | Status: SHIPPED | OUTPATIENT
Start: 2017-08-10 | End: 2018-02-20

## 2017-08-10 RX ADMIN — CLONIDINE HYDROCHLORIDE 0.1 MG: 0.1 TABLET ORAL at 08:51

## 2017-08-10 RX ADMIN — TORSEMIDE 200 MG: 100 TABLET ORAL at 08:56

## 2017-08-10 RX ADMIN — PAROXETINE HYDROCHLORIDE 20 MG: 20 TABLET, FILM COATED ORAL at 08:55

## 2017-08-10 RX ADMIN — PREDNISOLONE ACETATE 1 DROP: 10 SUSPENSION/ DROPS OPHTHALMIC at 08:55

## 2017-08-10 RX ADMIN — CARVEDILOL 25 MG: 25 TABLET, FILM COATED ORAL at 08:53

## 2017-08-10 RX ADMIN — PANTOPRAZOLE SODIUM 40 MG: 40 TABLET, DELAYED RELEASE ORAL at 07:55

## 2017-08-10 RX ADMIN — DOXERCALCIFEROL 3 MCG: 2 INJECTION, SOLUTION INTRAVENOUS at 12:24

## 2017-08-10 RX ADMIN — GABAPENTIN 100 MG: 100 CAPSULE ORAL at 08:50

## 2017-08-10 RX ADMIN — LOSARTAN POTASSIUM 100 MG: 50 TABLET, FILM COATED ORAL at 08:51

## 2017-08-10 RX ADMIN — ALPRAZOLAM 0.25 MG: 0.25 TABLET ORAL at 12:00

## 2017-08-10 RX ADMIN — OXYCODONE HYDROCHLORIDE AND ACETAMINOPHEN 2 TABLET: 5; 325 TABLET ORAL at 11:56

## 2017-08-10 RX ADMIN — BRIMONIDINE TARTRATE 1 DROP: 1.5 SOLUTION OPHTHALMIC at 08:52

## 2017-08-10 RX ADMIN — DORZOLAMIDE HYDROCHLORIDE AND TIMOLOL MALEATE 1 DROP: 20; 5 SOLUTION/ DROPS OPHTHALMIC at 08:54

## 2017-08-10 RX ADMIN — AMLODIPINE BESYLATE 10 MG: 10 TABLET ORAL at 08:51

## 2017-08-10 RX ADMIN — VITAMIN D, TAB 1000IU (100/BT) 1000 UNITS: 25 TAB at 08:47

## 2017-08-10 RX ADMIN — RENAGEL 800 MG: 800 TABLET ORAL at 08:56

## 2017-08-10 RX ADMIN — LEVOTHYROXINE SODIUM 50 MCG: 50 TABLET ORAL at 07:55

## 2017-08-13 LAB
BACTERIA BLD CULT: NORMAL
BACTERIA BLD CULT: NORMAL

## 2017-09-07 ENCOUNTER — APPOINTMENT (OUTPATIENT)
Dept: LAB | Facility: CLINIC | Age: 52
End: 2017-09-07
Payer: MEDICARE

## 2017-09-07 ENCOUNTER — TRANSCRIBE ORDERS (OUTPATIENT)
Dept: LAB | Facility: CLINIC | Age: 52
End: 2017-09-07

## 2017-09-07 DIAGNOSIS — D68.4 ACQUIRED COAGULATION FACTOR DEFICIENCY (HCC): ICD-10-CM

## 2017-09-07 DIAGNOSIS — Z79.01 LONG TERM (CURRENT) USE OF ANTICOAGULANTS: Primary | ICD-10-CM

## 2017-09-07 DIAGNOSIS — Z79.01 LONG TERM (CURRENT) USE OF ANTICOAGULANTS: ICD-10-CM

## 2017-09-07 LAB
INR PPP: 3.53 (ref 0.86–1.16)
PROTHROMBIN TIME: 35.9 SECONDS (ref 12.1–14.4)

## 2017-09-07 PROCEDURE — 85610 PROTHROMBIN TIME: CPT

## 2017-09-07 PROCEDURE — 36415 COLL VENOUS BLD VENIPUNCTURE: CPT

## 2017-09-09 ENCOUNTER — APPOINTMENT (EMERGENCY)
Dept: RADIOLOGY | Facility: HOSPITAL | Age: 52
End: 2017-09-09
Payer: MEDICARE

## 2017-09-09 ENCOUNTER — HOSPITAL ENCOUNTER (EMERGENCY)
Facility: HOSPITAL | Age: 52
Discharge: HOME | End: 2017-09-09
Attending: EMERGENCY MEDICINE
Payer: MEDICARE

## 2017-09-09 VITALS
OXYGEN SATURATION: 98 % | DIASTOLIC BLOOD PRESSURE: 63 MMHG | RESPIRATION RATE: 16 BRPM | HEART RATE: 97 BPM | SYSTOLIC BLOOD PRESSURE: 142 MMHG | BODY MASS INDEX: 44.23 KG/M2 | WEIGHT: 274.03 LBS | TEMPERATURE: 97.6 F

## 2017-09-09 DIAGNOSIS — L08.82 OMPHALITIS IN ADULT: ICD-10-CM

## 2017-09-09 DIAGNOSIS — K42.9 UMBILICAL HERNIA: ICD-10-CM

## 2017-09-09 DIAGNOSIS — R10.9 ABDOMINAL PAIN: Primary | ICD-10-CM

## 2017-09-09 LAB
ALBUMIN SERPL BCP-MCNC: 2.8 G/DL (ref 3.5–5)
ALP SERPL-CCNC: 139 U/L (ref 46–116)
ALT SERPL W P-5'-P-CCNC: 8 U/L (ref 12–78)
ANION GAP SERPL CALCULATED.3IONS-SCNC: 11 MMOL/L (ref 4–13)
AST SERPL W P-5'-P-CCNC: 17 U/L (ref 5–45)
ATRIAL RATE: 80 BPM
BASOPHILS # BLD AUTO: 0.03 THOUSANDS/ΜL (ref 0–0.1)
BASOPHILS NFR BLD AUTO: 0 % (ref 0–1)
BILIRUB SERPL-MCNC: 0.22 MG/DL (ref 0.2–1)
BUN SERPL-MCNC: 65 MG/DL (ref 5–25)
CALCIUM SERPL-MCNC: 7.8 MG/DL (ref 8.3–10.1)
CHLORIDE SERPL-SCNC: 100 MMOL/L (ref 100–108)
CO2 SERPL-SCNC: 24 MMOL/L (ref 21–32)
CREAT SERPL-MCNC: 7.95 MG/DL (ref 0.6–1.3)
EOSINOPHIL # BLD AUTO: 0.15 THOUSAND/ΜL (ref 0–0.61)
EOSINOPHIL NFR BLD AUTO: 2 % (ref 0–6)
ERYTHROCYTE [DISTWIDTH] IN BLOOD BY AUTOMATED COUNT: 13.7 % (ref 11.6–15.1)
GFR SERPL CREATININE-BSD FRML MDRD: 5 ML/MIN/1.73SQ M
GLUCOSE SERPL-MCNC: 239 MG/DL (ref 65–140)
HCT VFR BLD AUTO: 27.9 % (ref 34.8–46.1)
HGB BLD-MCNC: 9.1 G/DL (ref 11.5–15.4)
LIPASE SERPL-CCNC: 171 U/L (ref 73–393)
LYMPHOCYTES # BLD AUTO: 1.12 THOUSANDS/ΜL (ref 0.6–4.47)
LYMPHOCYTES NFR BLD AUTO: 12 % (ref 14–44)
MCH RBC QN AUTO: 31.1 PG (ref 26.8–34.3)
MCHC RBC AUTO-ENTMCNC: 32.6 G/DL (ref 31.4–37.4)
MCV RBC AUTO: 95 FL (ref 82–98)
MONOCYTES # BLD AUTO: 0.61 THOUSAND/ΜL (ref 0.17–1.22)
MONOCYTES NFR BLD AUTO: 7 % (ref 4–12)
NEUTROPHILS # BLD AUTO: 7.47 THOUSANDS/ΜL (ref 1.85–7.62)
NEUTS SEG NFR BLD AUTO: 79 % (ref 43–75)
NRBC BLD AUTO-RTO: 0 /100 WBCS
P AXIS: 23 DEGREES
PLATELET # BLD AUTO: 187 THOUSANDS/UL (ref 149–390)
PMV BLD AUTO: 10.7 FL (ref 8.9–12.7)
POTASSIUM SERPL-SCNC: 5 MMOL/L (ref 3.5–5.3)
PR INTERVAL: 168 MS
PROT SERPL-MCNC: 7.1 G/DL (ref 6.4–8.2)
QRS AXIS: 40 DEGREES
QRSD INTERVAL: 98 MS
QT INTERVAL: 394 MS
QTC INTERVAL: 454 MS
RBC # BLD AUTO: 2.93 MILLION/UL (ref 3.81–5.12)
SODIUM SERPL-SCNC: 135 MMOL/L (ref 136–145)
T WAVE AXIS: 42 DEGREES
TROPONIN I SERPL-MCNC: <0.02 NG/ML
VENTRICULAR RATE: 80 BPM
WBC # BLD AUTO: 9.41 THOUSAND/UL (ref 4.31–10.16)

## 2017-09-09 PROCEDURE — 85025 COMPLETE CBC W/AUTO DIFF WBC: CPT | Performed by: EMERGENCY MEDICINE

## 2017-09-09 PROCEDURE — 83690 ASSAY OF LIPASE: CPT | Performed by: EMERGENCY MEDICINE

## 2017-09-09 PROCEDURE — 74176 CT ABD & PELVIS W/O CONTRAST: CPT

## 2017-09-09 PROCEDURE — 99285 EMERGENCY DEPT VISIT HI MDM: CPT

## 2017-09-09 PROCEDURE — 93005 ELECTROCARDIOGRAM TRACING: CPT

## 2017-09-09 PROCEDURE — 84484 ASSAY OF TROPONIN QUANT: CPT | Performed by: EMERGENCY MEDICINE

## 2017-09-09 PROCEDURE — 87186 SC STD MICRODIL/AGAR DIL: CPT | Performed by: EMERGENCY MEDICINE

## 2017-09-09 PROCEDURE — 96375 TX/PRO/DX INJ NEW DRUG ADDON: CPT

## 2017-09-09 PROCEDURE — 96374 THER/PROPH/DIAG INJ IV PUSH: CPT

## 2017-09-09 PROCEDURE — 87147 CULTURE TYPE IMMUNOLOGIC: CPT | Performed by: EMERGENCY MEDICINE

## 2017-09-09 PROCEDURE — 87205 SMEAR GRAM STAIN: CPT | Performed by: EMERGENCY MEDICINE

## 2017-09-09 PROCEDURE — 36415 COLL VENOUS BLD VENIPUNCTURE: CPT | Performed by: EMERGENCY MEDICINE

## 2017-09-09 PROCEDURE — 80053 COMPREHEN METABOLIC PANEL: CPT | Performed by: EMERGENCY MEDICINE

## 2017-09-09 PROCEDURE — 87070 CULTURE OTHR SPECIMN AEROBIC: CPT | Performed by: EMERGENCY MEDICINE

## 2017-09-09 RX ORDER — CHLORHEXIDINE GLUCONATE 4 G/100ML
1 SOLUTION TOPICAL DAILY PRN
Qty: 120 ML | Refills: 0 | Status: SHIPPED | OUTPATIENT
Start: 2017-09-09 | End: 2018-05-22

## 2017-09-09 RX ORDER — DIPHENHYDRAMINE HYDROCHLORIDE 50 MG/ML
25 INJECTION INTRAMUSCULAR; INTRAVENOUS ONCE
Status: COMPLETED | OUTPATIENT
Start: 2017-09-09 | End: 2017-09-09

## 2017-09-09 RX ORDER — METOCLOPRAMIDE HYDROCHLORIDE 5 MG/ML
10 INJECTION INTRAMUSCULAR; INTRAVENOUS ONCE
Status: COMPLETED | OUTPATIENT
Start: 2017-09-09 | End: 2017-09-09

## 2017-09-09 RX ORDER — CEPHALEXIN 500 MG/1
500 CAPSULE ORAL EVERY 6 HOURS SCHEDULED
Qty: 28 CAPSULE | Refills: 0 | Status: SHIPPED | OUTPATIENT
Start: 2017-09-09 | End: 2017-09-16

## 2017-09-09 RX ORDER — CEPHALEXIN 250 MG/1
500 CAPSULE ORAL ONCE
Status: COMPLETED | OUTPATIENT
Start: 2017-09-09 | End: 2017-09-09

## 2017-09-09 RX ORDER — ONDANSETRON 2 MG/ML
4 INJECTION INTRAMUSCULAR; INTRAVENOUS ONCE
Status: COMPLETED | OUTPATIENT
Start: 2017-09-09 | End: 2017-09-09

## 2017-09-09 RX ADMIN — CEPHALEXIN 500 MG: 250 CAPSULE ORAL at 11:16

## 2017-09-09 RX ADMIN — HYDROMORPHONE HYDROCHLORIDE 1 MG: 1 INJECTION, SOLUTION INTRAMUSCULAR; INTRAVENOUS; SUBCUTANEOUS at 09:21

## 2017-09-09 RX ADMIN — DIPHENHYDRAMINE HYDROCHLORIDE 25 MG: 50 INJECTION, SOLUTION INTRAMUSCULAR; INTRAVENOUS at 11:17

## 2017-09-09 RX ADMIN — ONDANSETRON 4 MG: 2 INJECTION INTRAMUSCULAR; INTRAVENOUS at 08:39

## 2017-09-09 RX ADMIN — METOCLOPRAMIDE 10 MG: 5 INJECTION, SOLUTION INTRAMUSCULAR; INTRAVENOUS at 09:21

## 2017-09-12 ENCOUNTER — GENERIC CONVERSION - ENCOUNTER (OUTPATIENT)
Dept: OTHER | Facility: OTHER | Age: 52
End: 2017-09-12

## 2017-09-12 ENCOUNTER — LAB REQUISITION (OUTPATIENT)
Dept: LAB | Facility: HOSPITAL | Age: 52
End: 2017-09-12
Payer: MEDICARE

## 2017-09-12 DIAGNOSIS — N18.6 END STAGE RENAL DISEASE (HCC): ICD-10-CM

## 2017-09-12 LAB
BACTERIA WND AEROBE CULT: NORMAL
BACTERIA WND AEROBE CULT: NORMAL
GRAM STN SPEC: NORMAL
GRAM STN SPEC: NORMAL

## 2017-09-12 PROCEDURE — 87205 SMEAR GRAM STAIN: CPT | Performed by: INTERNAL MEDICINE

## 2017-09-12 PROCEDURE — 87070 CULTURE OTHR SPECIMN AEROBIC: CPT | Performed by: INTERNAL MEDICINE

## 2017-09-15 LAB
BACTERIA WND AEROBE CULT: NO GROWTH
GRAM STN SPEC: NORMAL

## 2017-09-17 ENCOUNTER — HOSPITAL ENCOUNTER (EMERGENCY)
Facility: HOSPITAL | Age: 52
Discharge: HOME | End: 2017-09-18
Attending: EMERGENCY MEDICINE | Admitting: EMERGENCY MEDICINE
Payer: MEDICARE

## 2017-09-17 DIAGNOSIS — R42 LIGHTHEADEDNESS: Primary | ICD-10-CM

## 2017-09-17 LAB
BASOPHILS # BLD AUTO: 0.04 THOUSANDS/ΜL (ref 0–0.1)
BASOPHILS NFR BLD AUTO: 1 % (ref 0–1)
EOSINOPHIL # BLD AUTO: 0.37 THOUSAND/ΜL (ref 0–0.61)
EOSINOPHIL NFR BLD AUTO: 5 % (ref 0–6)
ERYTHROCYTE [DISTWIDTH] IN BLOOD BY AUTOMATED COUNT: 13.4 % (ref 11.6–15.1)
HCT VFR BLD AUTO: 26.5 % (ref 34.8–46.1)
HGB BLD-MCNC: 8.8 G/DL (ref 11.5–15.4)
LYMPHOCYTES # BLD AUTO: 1.25 THOUSANDS/ΜL (ref 0.6–4.47)
LYMPHOCYTES NFR BLD AUTO: 16 % (ref 14–44)
MCH RBC QN AUTO: 31.7 PG (ref 26.8–34.3)
MCHC RBC AUTO-ENTMCNC: 33.2 G/DL (ref 31.4–37.4)
MCV RBC AUTO: 95 FL (ref 82–98)
MONOCYTES # BLD AUTO: 0.69 THOUSAND/ΜL (ref 0.17–1.22)
MONOCYTES NFR BLD AUTO: 9 % (ref 4–12)
NEUTROPHILS # BLD AUTO: 5.6 THOUSANDS/ΜL (ref 1.85–7.62)
NEUTS SEG NFR BLD AUTO: 69 % (ref 43–75)
NRBC BLD AUTO-RTO: 0 /100 WBCS
PLATELET # BLD AUTO: 190 THOUSANDS/UL (ref 149–390)
PMV BLD AUTO: 10 FL (ref 8.9–12.7)
RBC # BLD AUTO: 2.78 MILLION/UL (ref 3.81–5.12)
WBC # BLD AUTO: 8 THOUSAND/UL (ref 4.31–10.16)

## 2017-09-17 PROCEDURE — 85025 COMPLETE CBC W/AUTO DIFF WBC: CPT | Performed by: EMERGENCY MEDICINE

## 2017-09-17 PROCEDURE — 84100 ASSAY OF PHOSPHORUS: CPT | Performed by: EMERGENCY MEDICINE

## 2017-09-17 PROCEDURE — 83735 ASSAY OF MAGNESIUM: CPT | Performed by: EMERGENCY MEDICINE

## 2017-09-17 PROCEDURE — 80048 BASIC METABOLIC PNL TOTAL CA: CPT | Performed by: EMERGENCY MEDICINE

## 2017-09-17 PROCEDURE — 36415 COLL VENOUS BLD VENIPUNCTURE: CPT | Performed by: EMERGENCY MEDICINE

## 2017-09-18 VITALS
BODY MASS INDEX: 44.22 KG/M2 | OXYGEN SATURATION: 98 % | SYSTOLIC BLOOD PRESSURE: 153 MMHG | WEIGHT: 274 LBS | RESPIRATION RATE: 18 BRPM | DIASTOLIC BLOOD PRESSURE: 65 MMHG | TEMPERATURE: 97.8 F | HEART RATE: 69 BPM

## 2017-09-18 LAB
ANION GAP SERPL CALCULATED.3IONS-SCNC: 9 MMOL/L (ref 4–13)
BUN SERPL-MCNC: 52 MG/DL (ref 5–25)
CALCIUM SERPL-MCNC: 7.8 MG/DL (ref 8.3–10.1)
CHLORIDE SERPL-SCNC: 97 MMOL/L (ref 100–108)
CO2 SERPL-SCNC: 30 MMOL/L (ref 21–32)
CREAT SERPL-MCNC: 7.85 MG/DL (ref 0.6–1.3)
GFR SERPL CREATININE-BSD FRML MDRD: 5 ML/MIN/1.73SQ M
GLUCOSE SERPL-MCNC: 161 MG/DL (ref 65–140)
MAGNESIUM SERPL-MCNC: 2.3 MG/DL (ref 1.6–2.6)
PHOSPHATE SERPL-MCNC: 5.1 MG/DL (ref 2.7–4.5)
POTASSIUM SERPL-SCNC: 4.8 MMOL/L (ref 3.5–5.3)
SODIUM SERPL-SCNC: 136 MMOL/L (ref 136–145)

## 2017-09-18 PROCEDURE — 99283 EMERGENCY DEPT VISIT LOW MDM: CPT

## 2017-09-20 ENCOUNTER — GENERIC CONVERSION - ENCOUNTER (OUTPATIENT)
Dept: OTHER | Facility: OTHER | Age: 52
End: 2017-09-20

## 2017-09-22 ENCOUNTER — APPOINTMENT (OUTPATIENT)
Dept: WOUND CARE | Facility: HOSPITAL | Age: 52
End: 2017-09-22
Payer: MEDICARE

## 2017-09-22 PROCEDURE — 97597 DBRDMT OPN WND 1ST 20 CM/<: CPT | Performed by: PREVENTIVE MEDICINE

## 2017-09-22 PROCEDURE — 99213 OFFICE O/P EST LOW 20 MIN: CPT | Performed by: PREVENTIVE MEDICINE

## 2017-09-29 ENCOUNTER — APPOINTMENT (OUTPATIENT)
Dept: WOUND CARE | Facility: HOSPITAL | Age: 52
End: 2017-09-29
Payer: MEDICARE

## 2017-09-29 PROCEDURE — 97597 DBRDMT OPN WND 1ST 20 CM/<: CPT | Performed by: PREVENTIVE MEDICINE

## 2018-01-13 NOTE — PROGRESS NOTES
Assessment    1  Postoperative follow-up (V67 00) (Z09)    Discussion/Summary  Discussion Summary:   55 y/o here for incision f/u  Plan:  1  Umbilical incision packed successfully, patient instructed on how to change and she will do it daily until seen by us in 1 week  2  RTC 1 week for incision check  Chief Complaint  Chief Complaint Free Text Note Form: Patient here post op  Patient states the last time she was here the incision site was glued back together and after 2 days it came back apart  Patient complains of some fluid leakage, and foul odor  History of Present Illness  HPI: 55 y/o here for incision check s/p TLH 3 weeks ago  She had 3 incisions from the [place] [id number] and the 2 lower quadrant incisions healed very well  The umbilical incision however keeps oozing malodorous d/c  From the surgery she had subcuticular incision which likely came apart, sterri-strips were applied but she keeps leaking and last week, hysto-acryl was applied but after day number 2 she continue to leak the same discharge  On inspection, the incision edges are  but no redness or tenderness, just on separation there is evidence of fat necrosis, when probed the incision is very superficial  I consulted the wound care nurse via phone who suggested based on the physical exam and the patient hx of MRSA to pack the wound with Silver ANtimicrobial wound dressing Axorb Extra AG + which we have in the clinic  This was done today  Active Problems    1  Abnormal uterine bleeding (AUB) (626 9) (N93 9)   2  Allergic rhinitis (477 9) (J30 9)   3  Arteriovenous Surgery Creation Of A-V Fistula   4  Benign neoplasm of skin (216 9) (D23 9)   5  Cardiomyopathy (425 4) (I42 9)   6  Cervical dysplasia (622 10) (N87 9)   7  Chronic endometritis (615 1) (N71 1)   8  Complications Due To A Renal Dialysis Device, Implant, Or Graft (996 73)   9  Deep disruption of operation wound (998 31) (T81 32XA)   10   Dehiscence of external surgical wound (998 32) (T81 31XA)   11  Diabetes mellitus with neurological manifestation (250 60) (E11 49)   12  Dialysis patient (V45 11) (Z99 2)   13  End stage renal disease (585 6) (N18 6)   14  Esophageal reflux (530 81) (K21 9)   15  Hemodialysis   16  History of diverticulitis (V12 70) (Z87 19)   17  Hypertension (401 9) (I10)   18  Legal blindness, as defined in [place] (369 4) (H54 8)   19  Limb pain (729 5) (M79 609)   20  Lower back pain (724 2) (M54 5)   21  Menorrhagia (626 2) (N92 0)   22  Mononeuritis of upper limb, unspecified laterality (354 9) (G56 90)   23  Morbid obesity due to excess calories (278 01) (E66 01)   24  Other acute pulmonary embolism without acute cor pulmonale (415 19) (I26 99)   25  Pelvic cramping (625 9) (N94 9)   26  S/P hysterectomy (V88 01) (Z90 710)   27  Stenosis Due To Renal Dialysis Device, Implant, Or Graft (996 73)   28  Thrombophlebitis Of The Saphenous Vein (451 0)   29  Tinea pedis (110 4) (B35 3)   30  Type 2 diabetes mellitus (250 00) (E11 9)   31  Ulcer of midfoot (707 14) (L97 409)   32  Ulcer On The Feet Dorsal Loguin Scale ___ (0-5)   33  Warfarin anticoagulation (V58 61) (Z79 01)    Past Medical History    1  History of Bacterial vaginosis (616 10,041 9) (N76 0,B96 89)   2  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)   3  History of RAHUL II (cervical intraepithelial neoplasia II) (622 12) (N87 1)   4  History of blood clots (V12 51) (Z86 718)   5  History of blood clotting disorder (V12 3) (Z86 2)   6  History of blurred vision (V12 49) (Z86 69)   7  History of candidal vulvovaginitis (V13 29) (Z86 19)   8  History of endometritis (V13 29) (Z87 42)   9  History of varicose veins of lower extremity (V12 59) (Z86 79)   10  Hypertension (401 9) (I10)   11  Type 2 diabetes mellitus (250 00) (E11 9)    Surgical History    1  Arteriovenous Surgery Creation Of A-V Fistula   2  History of Arteriovenous Surgery Creation Of A-V Fistula   3   History of Arteriovenous Surgery Creation Of A-V Fistula   4  History of Arteriovenous Surgery Thrombectomy,Percutan Of AV Fistula   5  History of Cervical Loop Electrosurgical Excision (LEEP)   6  History of  Section   7  History of Dilation And Curettage   8  History of Gynecologic Services Thermal Endometrial Ablation   9  Hemodialysis   10  History of Open Arteriovenous Anastomosis By Forearm Vein Transposition   11  History of Pericardial Surgery   12  History of Surgery Foot Amputation Metatarsal And Toe    Family History    1  Family history of cardiac disorder (V17 49) (Z82 49)   2  Family history of diabetes mellitus (V18 0) (Z83 3)    3  Family history of Rupture of small intestine    4  Family history of Diabetes Mellitus (V18 0)   5  Family history of Heart Disease (V17 49)    Social History    · Denied: History of Alcohol   · Denied: History of Drug Use   · Former smoker ( 82) (V88 534)    Current Meds   1  Albuterol Sulfate (5 MG/ML) 0 5% Inhalation Nebulization Solution; Therapy: (Recorded:2015) to Recorded   2  ALPRAZolam 0 25 MG Oral Tablet; Therapy: 70VFW2327 to Recorded   3  AmLODIPine Besylate 10 MG Oral Tablet; Therapy: (Recorded:2015) to Recorded   4  BD Pen Needle Short U/F 31G X 8 MM Miscellaneous; Therapy: 29EFL5356 to (Last Rx:2011)  Requested for: 2011 Ordered   5  Benadryl 25 MG Oral Capsule; Therapy: (Recorded:2015) to Recorded   6  Demadex 20 MG Oral Tablet; Therapy: (Recorded:2015) to Recorded   7  Fosrenol 500 MG Oral Tablet Chewable; Therapy: (Recorded:2015) to Recorded   8  Gabapentin 100 MG Oral Capsule; Therapy: 27AXO9119 to Recorded   9  HumaLOG KwikPen 100 UNIT/ML Subcutaneous Solution Pen-injector; Therapy: 18Buo9008 to Recorded   10  Lantus SoloStar 100 UNIT/ML Subcutaneous Solution Pen-injector; Therapy: 16EQB7673 to Recorded   11  Losartan Potassium 100 MG Oral Tablet; TAKE 1 TABLET DAILY;     Therapy: 04WSL2107 to (Last Rx:2011) Requested for: 88GXF9415 Ordered   12  Magnesium Oxide 400 (241 3 Mg) MG Oral Tablet; take 1 tablet by mouth twice a day; Therapy: 20Egu4991 to (Evaluate:11Jul2016)  Requested for: 07KCX4165; Last    Rx:13Jan2016 Ordered   13  Melatonin 3 MG Oral Capsule; Therapy: (Recorded:01May2015) to Recorded   14  Nephro-Denys TABS; Therapy: (Recorded:01May2015) to Recorded   15  OneTouch Ultra Blue In Vitro Strip; TEST 3 TIMES DAILY; Therapy: 62QSG1710 to (Last Rx:11Jan2012)  Requested for: 45YRS0958 Ordered   16  Pantoprazole Sodium 40 MG Oral Tablet Delayed Release; take 1 tablet by mouth twice a    day; Therapy: 70PBP1980 to (Evaluate:21May2016)  Requested for: 07WSX5036; Last    Rx:23Nov2015 Ordered   17  PARoxetine HCl - 10 MG Oral Tablet; Therapy: (Recorded:01May2015) to Recorded   18  Sure Comfort Pen Needles 31G X 5 MM Miscellaneous; Therapy: 95Ksz3965 to Recorded   19  TraMADol HCl - 50 MG Oral Tablet; Therapy: (Recorded:01May2015) to Recorded   20  Triphrocaps 1 MG Oral Capsule; take 1 capsule by mouth once daily; Therapy: 52RWC6093 to (Evaluate:12May2016)  Requested for: 48FAD5651; Last    Rx:13Jan2016 Ordered   21  Warfarin Sodium 5 MG Oral Tablet; TAKE 1 TABLET DAILY; Therapy: 39ZHX8491 to Recorded    Allergies    1  No Known Drug Allergies    2  No Known Environmental Allergies   3  No Known Food Allergies    Vitals  Vital Signs [Data Includes: Current Encounter]    Recorded: 48LLI3902 10:25AM   Temperature 96 9 F   Heart Rate 67   Systolic 873, RUE, Sitting   Diastolic 91, RUE, Sitting   Height 5 ft 6 in   Weight 255 lb    BMI Calculated 41 16   BSA Calculated 2 22   Pain Scale 5     Procedure    Procedure Note:     Post-Procedure: Follow-up in the office in 1 week(s)  a 4cm X 0 5cm piece of dressing was cut and packed in the incision  A 4X$ was the applied on top with tape to cover it  Pt instructed how to change it and she will change it everyday before showering   SHe has experience with this as she has done it before when she had her wound cared then        Future Appointments    Date/Time Provider Specialty Site   03/21/2016 01:30 PM Jass Conroy MD [facility]  [place]   03/18/2016 07:45 AM Chris Martinez MD Obstetrics/Gynecology [facility]   04/29/2016 09:00 AM [facility] Physician Schedule  [facility]  [address]     Signatures   Electronically signed by : Manuel Pires MD; Mar 11 2016 11:35AM EST                       (Author)    Electronically signed by : JULITA Anguiano ; Mar 14 2016  4:39PM EST                       (Author)

## 2018-01-13 NOTE — MISCELLANEOUS
Message  telephone call from Dr Kraig Adame  States pt  was seen in ED and f/u in his office for infection in surgical incision  Would like patient seen by our office  Info  given to  to schedule appt  for patient      Active Problems    1  Allergic rhinitis (477 9) (J30 9)   2  Arteriovenous Surgery Creation Of A-V Fistula   3  Benign neoplasm of skin (216 9) (D23 9)   4  Cardiomyopathy (425 4) (I42 9)   5  Cervical dysplasia (622 10) (N87 9)   6  Chronic endometritis (615 1) (N71 1)   7  Complications Due To A Renal Dialysis Device, Implant, Or Graft (996 73)   8  Deep disruption of operation wound (998 31) (T81 32XA)   9  Dehiscence of external surgical wound (998 32) (T81 31XA)   10  Diabetes mellitus with neurological manifestation (250 60) (E11 49)   11  Dialysis patient (V45 11) (Z99 2)   12  End stage renal disease (585 6) (N18 6)   13  Esophageal reflux (530 81) (K21 9)   14  Hemodialysis   15  History of diverticulitis (V12 70) (Z87 19)   16  Hypertension (401 9) (I10)   17  Hypothyroidism (244 9) (E03 9)   18  Legal blindness, as defined in [place] (369 4) (H54 8)   19  Limb pain (729 5) (M79 609)   20  Lower back pain (724 2) (M54 5)   21  Mononeuritis of upper limb, unspecified laterality (354 9) (G56 90)   22  Morbid obesity due to excess calories (278 01) (E66 01)   23  Other acute pulmonary embolism without acute cor pulmonale (415 19) (I26 99)   24  Pelvic cramping (625 9) (R10 2)   25  Postoperative follow-up (V67 00) (Z09)   26  Pre-transplant evaluation for ESRD (end stage renal disease) (V72 83) (Z01 818)   27  S/P hysterectomy (V88 01) (Z90 710)   28  Skin tag of female perineum (624 8) (N90 89)   29  Stenosis Due To Renal Dialysis Device, Implant, Or Graft (996 73)   30  Thrombophlebitis Of The Saphenous Vein (451 0)   31  Tinea pedis (110 4) (B35 3)   32  Type 2 diabetes mellitus (250 00) (E11 9)   33  Ulcer of midfoot (377 14) (L9 409)   34   Ulcer On The Feet Dorsal Olguin Scale ___ (0-5)   35  Umbilical pain (895 83) (R10 33)   36  Warfarin anticoagulation (V58 61) (Z79 01)    Current Meds   1  Albuterol Sulfate (5 MG/ML) 0 5% Inhalation Nebulization Solution; Therapy: (Recorded:01May2015) to Recorded   2  ALPRAZolam 0 25 MG Oral Tablet; Therapy: 00OXV5723 to Recorded   3  AmLODIPine Besylate 10 MG Oral Tablet; Therapy: (Recorded:01May2015) to Recorded   4  Auryxia 1  MG(Fe) Oral Tablet; Therapy: 78PRD0524 to Recorded   5  BD Pen Needle Short U/F 31G X 8 MM Miscellaneous; Therapy: 09AFM8541 to (Last Rx:21Oct2011)  Requested for: 21Oct2011 Ordered   6  Benadryl 25 MG CAPS (DiphenhydrAMINE HCl); Therapy: (Recorded:01May2015) to Recorded   7  Brimonidine Tartrate 0 15 % Ophthalmic Solution; Therapy: 16MJA2781 to Recorded   8  CloNIDine HCl - 0 1 MG Oral Tablet; Therapy: 10FYJ4457 to Recorded   9  Demadex 20 MG Oral Tablet (Torsemide); Therapy: (Recorded:01May2015) to Recorded   10  Fosrenol 500 MG Oral Tablet Chewable (Lanthanum Carbonate); Therapy: (Recorded:01May2015) to Recorded   11  Gabapentin 100 MG Oral Capsule; Therapy: 11YVF7032 to Recorded   12  HumaLOG KwikPen 100 UNIT/ML Subcutaneous Solution Pen-injector; Therapy: 33Khh7350 to Recorded   13  Lantus SoloStar 100 UNIT/ML Subcutaneous Solution Pen-injector; Therapy: 61YQS9009 to Recorded   14  Latanoprost 0 005 % Ophthalmic Solution; Therapy: 36TQO7743 to Recorded   15  Losartan Potassium 100 MG Oral Tablet; TAKE 1 TABLET DAILY; Therapy: 69CKO4665 to (Last Rx:28Nov2011)  Requested for: 28Nov2011 Ordered   16  Magnesium Oxide 400 (241 3 Mg) MG Oral Tablet; take 1 tablet by mouth twice a day; Therapy: 55Fhk8092 to (Evaluate:28Jan2018)  Requested for: 01Aug2017 Recorded   17  Melatonin 3 MG Oral Capsule; Therapy: (Recorded:01May2015) to Recorded   18  Nephro-Denys TABS; Therapy: (Recorded:01May2015) to Recorded   19  OneTouch Ultra Blue In Vitro Strip; TEST 3 TIMES DAILY;     Therapy: 05TKI0551 to (Last Rx:11Jan2012)  Requested for: 25VIH3884 Ordered   20  Pantoprazole Sodium 40 MG Oral Tablet Delayed Release; take 1 tablet by mouth twice    a day; Therapy: 20JOL6598 to (072 27 852)  Requested for: 43Gxr9944 Recorded   21  PARoxetine HCl - 10 MG Oral Tablet; Therapy: (Recorded:60Czr8377) to Recorded   22  Sure Comfort Pen Needles 31G X 5 MM Miscellaneous; Therapy: 61Fno7248 to Recorded   23  Synthroid 50 MCG Oral Tablet (Levothyroxine Sodium); TAKE 1 TABLET DAILY AS    DIRECTED; Therapy: 94Dfk5654 to Recorded   24  TraMADol HCl - 50 MG Oral Tablet; Therapy: (Recorded:11Qpr9842) to Recorded   25  Triphrocaps 1 MG Oral Capsule; take 1 capsule by mouth once daily; Therapy: 15LSM6779 to ([place])  Requested for: 32Owc1176 Recorded   26  Warfarin Sodium 5 MG Oral Tablet; TAKE 1 TABLET DAILY; Therapy: 57EQD4621 to Recorded    Allergies    1  No Known Drug Allergies    2  No Known Environmental Allergies   3   No Known Food Allergies    Signatures   Electronically signed by : Rebekah Rodrigues RN; Sep 12 2017  1:12PM EST                       (Author)

## 2018-01-14 VITALS
HEIGHT: 66 IN | BODY MASS INDEX: 43.57 KG/M2 | SYSTOLIC BLOOD PRESSURE: 100 MMHG | DIASTOLIC BLOOD PRESSURE: 52 MMHG | WEIGHT: 271.13 LBS | HEART RATE: 70 BPM

## 2018-01-15 NOTE — PROGRESS NOTES
Discussion/Summary  Discussion Summary:   53 y/o s/p TLH on 7/60, with umbilical wound skin separation now completely healed  1) Follow-up as needed  Medication SE Review and Pt Understands Tx: The treatment plan was reviewed with the patient/guardian  The patient/guardian understands and agrees with the treatment plan      Chief Complaint  Chief Complaint Free Text Note Form: Patient is here for incision check  Patient states she has healed and feels well  History of Present Illness  HPI: 53 y/o s/p TLH on 2/94, with umbilical wound skin separation, healing well, presents for follow-up incision check today  She notes the last time she had to place a dressing on incision was 3 weeks ago  She no longer is experiencing any foul odor, pain, redness and only occasionally notes serous drainage, which is clear  She has been afebrile and has no other complaints  Review of Systems  Focused-Female:   Constitutional: No fever, no chills, feels well, no tiredness, no recent weight gain or loss, no fever, not feeling poorly and no chills  Gastrointestinal: no abdominal pain and no nausea  Integumentary: skin wound, but as noted in HPI  ROS Reviewed:   ROS reviewed  Active Problems    1  Abnormal uterine bleeding (AUB) (626 9) (N93 9)   2  Allergic rhinitis (477 9) (J30 9)   3  Arteriovenous Surgery Creation Of A-V Fistula   4  Benign neoplasm of skin (216 9) (D23 9)   5  Cardiomyopathy (425 4) (I42 9)   6  Cervical dysplasia (622 10) (N87 9)   7  Chronic endometritis (615 1) (N71 1)   8  Complications Due To A Renal Dialysis Device, Implant, Or Graft (996 73)   9  Deep disruption of operation wound (998 31) (T81 32XA)   10  Dehiscence of external surgical wound (998 32) (T81 31XA)   11  Diabetes mellitus with neurological manifestation (250 60) (E11 49)   12  Dialysis patient (V45 11) (Z99 2)   13  End stage renal disease (585 6) (N18 6)   14  Esophageal reflux (530 81) (K21 9)   15  Hemodialysis   16  History of diverticulitis (V12 70) (Z87 19)   17  Hypertension (401 9) (I10)   18  Legal blindness, as defined in [place] (369 4) (H54 8)   19  Limb pain (729 5) (M79 609)   20  Lower back pain (724 2) (M54 5)   21  Menorrhagia (626 2) (N92 0)   22  Mononeuritis of upper limb, unspecified laterality (354 9) (G56 90)   23  Morbid obesity due to excess calories (278 01) (E66 01)   24  Other acute pulmonary embolism without acute cor pulmonale (415 19) (I26 99)   25  Pelvic cramping (625 9) (N94 9)   26  Postoperative follow-up (V67 00) (Z09)   27  S/P hysterectomy (V88 01) (Z90 710)   28  Stenosis Due To Renal Dialysis Device, Implant, Or Graft (996 73)   29  Thrombophlebitis Of The Saphenous Vein (451 0)   30  Tinea pedis (110 4) (B35 3)   31  Type 2 diabetes mellitus (250 00) (E11 9)   32  Ulcer of midfoot (707 14) (L97 409)   33  Ulcer On The Feet Dorsal Olguin Scale ___ (0-5)   34  Warfarin anticoagulation (V58 61) (Z79 01)    Past Medical History    1  History of Bacterial vaginosis (616 10,041 9) (N76 0,B96 89)   2  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)   3  History of RAHUL II (cervical intraepithelial neoplasia II) (622 12) (N87 1)   4  History of blood clots (V12 51) (Z86 718)   5  History of blood clotting disorder (V12 3) (Z86 2)   6  History of blurred vision (V12 49) (Z86 69)   7  History of candidal vulvovaginitis (V13 29) (Z86 19)   8  History of endometritis (V13 29) (Z87 42)   9  History of varicose veins of lower extremity (V12 59) (Z86 79)   10  Hypertension (401 9) (I10)   11  Type 2 diabetes mellitus (250 00) (E11 9)    Surgical History    1  Arteriovenous Surgery Creation Of A-V Fistula   2  History of Arteriovenous Surgery Creation Of A-V Fistula   3  History of Arteriovenous Surgery Creation Of A-V Fistula   4  History of Arteriovenous Surgery Thrombectomy,Percutan Of AV Fistula   5  History of Cervical Loop Electrosurgical Excision (LEEP)   6  History of  Section   7   History of Dilation And Curettage   8  History of Gynecologic Services Thermal Endometrial Ablation   9  Hemodialysis   10  History of Open Arteriovenous Anastomosis By Forearm Vein Transposition   11  History of Pericardial Surgery   12  History of Surgery Foot Amputation Metatarsal And Toe    Family History    1  Family history of cardiac disorder (V17 49) (Z82 49)   2  Family history of diabetes mellitus (V18 0) (Z83 3)    3  Family history of Rupture of small intestine    4  Family history of Diabetes Mellitus (V18 0)   5  Family history of Heart Disease (V17 49)    Social History    · Denied: History of Alcohol   · Denied: History of Drug Use   · Former smoker (V15 82) (Z78 889)    Current Meds   1  Albuterol Sulfate (5 MG/ML) 0 5% Inhalation Nebulization Solution; Therapy: (Recorded:01May2015) to Recorded   2  ALPRAZolam 0 25 MG Oral Tablet; Therapy: 36IVV7328 to Recorded   3  AmLODIPine Besylate 10 MG Oral Tablet; Therapy: (Recorded:01May2015) to Recorded   4  BD Pen Needle Short U/F 31G X 8 MM Miscellaneous; Therapy: 18TOV5162 to (Last Rx:21Oct2011)  Requested for: 21Oct2011 Ordered   5  Benadryl 25 MG Oral Capsule; Therapy: (Recorded:01May2015) to Recorded   6  Demadex 20 MG Oral Tablet; Therapy: (Recorded:01May2015) to Recorded   7  Fosrenol 500 MG Oral Tablet Chewable; Therapy: (Recorded:01May2015) to Recorded   8  Gabapentin 100 MG Oral Capsule; Therapy: 71QNB1485 to Recorded   9  HumaLOG KwikPen 100 UNIT/ML Subcutaneous Solution Pen-injector; Therapy: 49Www7127 to Recorded   10  Lantus SoloStar 100 UNIT/ML Subcutaneous Solution Pen-injector; Therapy: 86ZBR3202 to Recorded   11  Losartan Potassium 100 MG Oral Tablet; TAKE 1 TABLET DAILY; Therapy: 99SON3947 to (Last Rx:28Nov2011)  Requested for: 28Nov2011 Ordered   12  Magnesium Oxide 400 (241 3 Mg) MG Oral Tablet; take 1 tablet by mouth twice a day;     Therapy: 35Jnu8829 to (Evaluate:72Qvc0014)  Requested for: 34EXC1165; Last    Rx:13Jan2016 Ordered   13  Melatonin 3 MG Oral Capsule; Therapy: (Recorded:01May2015) to Recorded   14  Nephro-Denys TABS; Therapy: (Recorded:01May2015) to Recorded   15  OneTouch Ultra Blue In Vitro Strip; TEST 3 TIMES DAILY; Therapy: 56XQT2366 to (Last Rx:11Jan2012)  Requested for: 11VTK0711 Ordered   16  Pantoprazole Sodium 40 MG Oral Tablet Delayed Release; take 1 tablet by mouth twice a    day; Therapy: 54UUC3298 to (Evaluate:21May2016)  Requested for: 11OQV7302; Last    Rx:23Nov2015 Ordered   17  PARoxetine HCl - 10 MG Oral Tablet; Therapy: (Recorded:01May2015) to Recorded   18  Sure Comfort Pen Needles 31G X 5 MM Miscellaneous; Therapy: 47Wul4694 to Recorded   19  TraMADol HCl - 50 MG Oral Tablet; Therapy: (Recorded:01May2015) to Recorded   20  Triphrocaps 1 MG Oral Capsule; take 1 capsule by mouth once daily; Therapy: 37WDS3261 to (Evaluate:12May2016)  Requested for: 26VJU7340; Last    Rx:13Jan2016 Ordered   21  Warfarin Sodium 5 MG Oral Tablet; TAKE 1 TABLET DAILY; Therapy: 55MJZ1277 to Recorded    Allergies    1  No Known Drug Allergies    2  No Known Environmental Allergies   3  No Known Food Allergies    Vitals  Vital Signs [Data Includes: Current Encounter]    Recorded: 98KUK8713 16:79JT   Systolic 436   Diastolic 70   Height 5 ft 6 in   Weight 259 lb    BMI Calculated 41 8   BSA Calculated 2 23     Physical Exam    Constitutional   General appearance: No acute distress, well appearing and well nourished  Pulmonary   Respiratory effort: No increased work of breathing or signs of respiratory distress  Abdomen the umbilical incision is completely closed and nore incision separation     Psychiatric   Orientation to person, place, and time: Normal     Mood and affect: Normal        Results/Data  Encounter Results   Screen Ebola Flowsheet 69Cyu4685 08:11AM      Test Name Result Flag Reference   Exposure to Ebola Virus - Within 21 Days No         Future Appointments    Date/Time Provider Specialty Site   04/22/2016 07:45 AM Topher Gabriel MD Obstetrics/Gynecology [facility]   04/29/2016 09:00 AM [facility], Physician Schedule  [facility] [place]  [address]     Signatures   Electronically signed by : Rita Coe MD; Apr 17 2016 12:07AM EST                       (Author)    Electronically signed by : Tree Danielle MD; Apr 21 2016  8:23AM EST

## 2018-01-22 ENCOUNTER — TRANSCRIBE ORDERS (OUTPATIENT)
Dept: LAB | Facility: CLINIC | Age: 53
End: 2018-01-22

## 2018-01-22 VITALS
SYSTOLIC BLOOD PRESSURE: 132 MMHG | WEIGHT: 272.8 LBS | DIASTOLIC BLOOD PRESSURE: 60 MMHG | BODY MASS INDEX: 43.84 KG/M2 | HEIGHT: 66 IN | TEMPERATURE: 97.4 F

## 2018-01-22 DIAGNOSIS — E03.9 ACQUIRED HYPOTHYROIDISM: ICD-10-CM

## 2018-01-22 DIAGNOSIS — Z79.01 LONG-TERM (CURRENT) USE OF ANTICOAGULANTS: Primary | ICD-10-CM

## 2018-01-23 NOTE — MISCELLANEOUS
Message  Discussed her case with her ophthalmologist at [facility], initially she recommended 500 mg of acetazolamide twice a day however further information states that she should be on 250 mg twice a day given her ESRD status      Plan  Glaucoma (increased eye pressure)    · AcetaZOLAMIDE 250 MG Oral Tablet; TAKE 1 TABLET Twice daily    Signatures   Electronically signed by : Beronica Friedman DO; Dec 13 2017  2:00PM EST                       (Author)

## 2018-01-25 ENCOUNTER — APPOINTMENT (OUTPATIENT)
Dept: LAB | Facility: CLINIC | Age: 53
End: 2018-01-25
Payer: MEDICARE

## 2018-01-25 DIAGNOSIS — Z79.01 LONG-TERM (CURRENT) USE OF ANTICOAGULANTS: ICD-10-CM

## 2018-01-25 DIAGNOSIS — E03.9 ACQUIRED HYPOTHYROIDISM: ICD-10-CM

## 2018-01-25 LAB
INR PPP: 4.93 (ref 0.86–1.16)
PROTHROMBIN TIME: 46.8 SECONDS (ref 12.1–14.4)
TSH SERPL DL<=0.05 MIU/L-ACNC: 2.66 UIU/ML (ref 0.36–3.74)

## 2018-01-25 PROCEDURE — 36415 COLL VENOUS BLD VENIPUNCTURE: CPT

## 2018-01-25 PROCEDURE — 85610 PROTHROMBIN TIME: CPT

## 2018-01-25 PROCEDURE — 84443 ASSAY THYROID STIM HORMONE: CPT

## 2018-01-30 ENCOUNTER — HOSPITAL ENCOUNTER (EMERGENCY)
Facility: HOSPITAL | Age: 53
Discharge: HOME | End: 2018-01-30
Attending: EMERGENCY MEDICINE | Admitting: EMERGENCY MEDICINE
Payer: MEDICARE

## 2018-01-30 VITALS
SYSTOLIC BLOOD PRESSURE: 189 MMHG | HEART RATE: 78 BPM | DIASTOLIC BLOOD PRESSURE: 74 MMHG | BODY MASS INDEX: 41.97 KG/M2 | WEIGHT: 260 LBS | TEMPERATURE: 97.5 F | OXYGEN SATURATION: 96 % | RESPIRATION RATE: 18 BRPM

## 2018-01-30 DIAGNOSIS — H10.9 CONJUNCTIVITIS: Primary | ICD-10-CM

## 2018-01-30 DIAGNOSIS — H40.9 GLAUCOMA: ICD-10-CM

## 2018-01-30 PROCEDURE — 99283 EMERGENCY DEPT VISIT LOW MDM: CPT

## 2018-01-30 RX ORDER — ACETAZOLAMIDE 250 MG/1
250 TABLET ORAL 2 TIMES DAILY
Qty: 14 TABLET | Refills: 0 | Status: SHIPPED | OUTPATIENT
Start: 2018-01-30 | End: 2018-02-20

## 2018-01-30 RX ORDER — NEOMYCIN SULFATE, POLYMYXIN B SULFATE AND BACITRACIN ZINC 3.5; 10000; 4 MG/G; [USP'U]/G; [USP'U]/G
0.5 OINTMENT OPHTHALMIC ONCE
Status: DISCONTINUED | OUTPATIENT
Start: 2018-01-30 | End: 2018-01-30

## 2018-01-30 RX ORDER — ATROPINE SULFATE 10 MG/ML
2 SOLUTION/ DROPS OPHTHALMIC 3 TIMES DAILY
Status: DISCONTINUED | OUTPATIENT
Start: 2018-01-30 | End: 2018-01-30 | Stop reason: HOSPADM

## 2018-01-30 RX ORDER — ACETAZOLAMIDE 250 MG/1
250 TABLET ORAL EVERY 12 HOURS SCHEDULED
Status: DISCONTINUED | OUTPATIENT
Start: 2018-01-30 | End: 2018-01-30

## 2018-01-30 RX ORDER — ATROPINE SULFATE 10 MG/ML
1 SOLUTION/ DROPS OPHTHALMIC 4 TIMES DAILY
Qty: 1.5 ML | Refills: 0 | Status: SHIPPED | OUTPATIENT
Start: 2018-01-30 | End: 2018-02-06

## 2018-01-30 RX ORDER — ACETAZOLAMIDE 250 MG/1
250 TABLET ORAL EVERY 12 HOURS SCHEDULED
Status: DISCONTINUED | OUTPATIENT
Start: 2018-01-30 | End: 2018-01-30 | Stop reason: HOSPADM

## 2018-01-30 RX ORDER — ATROPINE SULFATE 10 MG/ML
2 SOLUTION/ DROPS OPHTHALMIC 3 TIMES DAILY
Status: DISCONTINUED | OUTPATIENT
Start: 2018-01-30 | End: 2018-01-30

## 2018-01-30 RX ORDER — BACITRACIN, NEOMYCIN, POLYMYXIN B 400; 3.5; 5 [USP'U]/G; MG/G; [USP'U]/G
OINTMENT TOPICAL 2 TIMES DAILY
Qty: 15 G | Refills: 0 | Status: SHIPPED | OUTPATIENT
Start: 2018-01-30 | End: 2018-05-22

## 2018-01-30 RX ORDER — NEOMYCIN SULFATE, POLYMYXIN B SULFATE AND BACITRACIN ZINC 3.5; 10000; 4 MG/G; [USP'U]/G; [USP'U]/G
0.5 OINTMENT OPHTHALMIC ONCE
Status: COMPLETED | OUTPATIENT
Start: 2018-01-30 | End: 2018-01-30

## 2018-01-30 RX ORDER — PROPARACAINE HYDROCHLORIDE 5 MG/ML
1 SOLUTION/ DROPS OPHTHALMIC ONCE
Status: COMPLETED | OUTPATIENT
Start: 2018-01-30 | End: 2018-01-30

## 2018-01-30 RX ORDER — ACETAMINOPHEN 325 MG/1
650 TABLET ORAL ONCE
Status: COMPLETED | OUTPATIENT
Start: 2018-01-30 | End: 2018-01-30

## 2018-01-30 RX ADMIN — PROPARACAINE HYDROCHLORIDE 1 DROP: 5 SOLUTION/ DROPS OPHTHALMIC at 17:25

## 2018-01-30 RX ADMIN — ATROPINE SULFATE 2 DROP: 10 SOLUTION/ DROPS OPHTHALMIC at 19:14

## 2018-01-30 RX ADMIN — ACETAMINOPHEN 650 MG: 325 TABLET, FILM COATED ORAL at 19:14

## 2018-01-30 RX ADMIN — NEOMYCIN SULFATE, POLYMYXIN B SULFATE AND BACITRACIN ZINC 0.5 INCH: 3.5; 10000; 4 OINTMENT OPHTHALMIC at 19:14

## 2018-01-30 RX ADMIN — ACETAZOLAMIDE 250 MG: 250 TABLET ORAL at 19:14

## 2018-01-30 NOTE — DISCHARGE INSTRUCTIONS
Follow up with Dr Elham Henson at his office as soon as possible  Call to make an appointment as soon as possible   Conjunctivitis   WHAT YOU SHOULD KNOW:   Conjunctivitis, or pink eye, is inflammation of your conjunctiva  The conjunctiva is a thin tissue that covers the front of your eye and the back of your eyelids  The conjunctiva helps protect your eye and keep it moist         INSTRUCTIONS:   Medicines:   · Allergy medicine: This medicine helps decrease itchy, red, swollen eyes caused by allergies  It may be given as a pill, eye drops, or nasal spray  · Antibiotics:  You will need antibiotics if your conjunctivitis is caused by bacteria  This medicine may be given as eye drops or eye ointment  · Steroid medicine: This medicine helps decrease inflammation  It may be given as a pill, eye drops, or nasal spray  · Take your medicine as directed  Call your healthcare provider if you think your medicine is not helping or if you have side effects  Tell him if you are allergic to any medicine  Keep a list of the medicines, vitamins, and herbs you take  Include the amounts, and when and why you take them  Bring the list or the pill bottles to follow-up visits  Carry your medicine list with you in case of an emergency  Follow up with your primary healthcare provider as directed: You may need to return for more tests on your eyes  These will help your primary healthcare provider check for eye damage  Write down your questions so you remember to ask them during your visits  Avoid the spread of conjunctivitis:   · Wash your hands often:  Wash your hands before you touch your eyes  Also wash your hands before you prepare or eat food and after you use the bathroom or change a diaper  · Avoid allergens:  Try to avoid the things that cause your allergies, such as pets, dust, or grass  · Avoid contact:  Do not share towels or washcloths  Try to stay away from others as much as possible   Ask when you can return to work or school  · Throw away eye makeup:  Throw away mascara and other eye makeup  Manage your symptoms:  · Apply a cool compress:  Wet a washcloth with cold water and place it on your eye  This will help decrease swelling  · Use eye drops:  Eye drops, or artificial tears, can be bought without a doctor's order  They help keep your eye moist     · Do not wear contact lenses: They can irritate your eye  Throw away the pair you are using and ask when you can wear them again  Use a new pair of lenses when your primary healthcare provider says it is okay  · Flush your eye:  You may need to flush your eye with saline to help decrease your symptoms  Ask for more information on how to flush your eye  Contact your primary healthcare provider if:   · Your eyesight becomes blurry  · You have tiny bumps or spots of blood on your eye  · You have questions or concerns about your condition or care  Return to the emergency department if:   · The swelling in your eye gets worse, even after treatment  · Your vision suddenly becomes worse or you cannot see at all  · Your eye begins to bleed  © 2014 [address] is for End User's use only and may not be sold, redistributed or otherwise used for commercial purposes  All illustrations and images included in CareNotes® are the copyrighted property of [employer] , [employer]  or Warren Weinstein  The above information is an  only  It is not intended as medical advice for individual conditions or treatments  Talk to your doctor, nurse or pharmacist before following any medical regimen to see if it is safe and effective for you  Glaucoma   WHAT YOU NEED TO KNOW:   Glaucoma is an eye disease that causes vision loss in one or both eyes  Glaucoma is caused by fluid buildup behind the eye  This puts pressure on your optic nerve and damages it  Glaucoma usually develops slowly         DISCHARGE INSTRUCTIONS: Medicines:   · Eye pressure medicines  help decrease eye pressure  They may also decrease the amount of fluid your eyes make or help your eyes drain better  · Take your medicine as directed  Contact your healthcare provider if you think your medicine is not helping or if you have side effects  Tell him of her if you are allergic to any medicine  Keep a list of the medicines, vitamins, and herbs you take  Include the amounts, and when and why you take them  Bring the list or the pill bottles to follow-up visits  Carry your medicine list with you in case of an emergency  Follow up with your healthcare provider or ophthalmologist as directed: You may need to return every 3 to 6 months to have your eye pressure checked  Write down your questions so you remember to ask them during your visits  Avoid behaviors that increase eye pressure:  Try not to strain when you have a bowel movement  Do not wear tight clothing around your neck or chest  Do not push or lift anything heavier than 5 pounds  Avoid strenuous activity  Avoid people who are sick:  Eye pressure increases when you sneeze or cough  Medical alert identification:  Wear medical alert jewelry or carry a card that says you have glaucoma  Ask your healthcare provider where to get these items  Contact your healthcare provider or ophthalmologist if:   · Your symptoms get worse, even after treatment  · Your eye medicine causes your eyes to sting or turn red  · You have questions or concerns about your condition or care  Return to the emergency department if:   · You have a sudden loss of vision  · You have blurry vision and a severe headache  · You have severe eye pain or a change in your vision  · You have nausea and are vomiting  © 2017 [address] Information is for End User's use only and may not be sold, redistributed or otherwise used for commercial purposes   All illustrations and images included in CareNotes® are the copyrighted property of [employer]  or Warren Weinstein  The above information is an  only  It is not intended as medical advice for individual conditions or treatments  Talk to your doctor, nurse or pharmacist before following any medical regimen to see if it is safe and effective for you

## 2018-01-30 NOTE — ED ATTENDING ATTESTATION
I, Ganesh Like DO Naheed, saw and evaluated the patient  I have discussed the patient with the resident/non-physician practitioner and agree with the resident's/non-physician practitioner's findings, Plan of Care, and MDM as documented in the resident's/non-physician practitioner's note, except where noted  All available labs and Radiology studies were reviewed  At this point I agree with the current assessment done in the Emergency Department  I have conducted an independent evaluation of this patient a history and physical is as follows:      Critical Care Time  CritCare Time    Procedures     50 yr old fem diabetic to the ED with right eye pain since this am   Pain to light and motion  Feels like her eye pressure is up  Blind in that eye secondary to glaucoma  Similar to last Aug with pressure elevation in the eye  Did not contact eye doc till late this afternoon  Exm; right eye: pupil dilated with injected conjunctiva  Mild tender to motion and touch  Small amount dc lower lid  Pln: pressures and contact Dr Jewel Peñaloza

## 2018-01-30 NOTE — ED NOTES
Pharmacy called at this time to check on medications  Will send after changing due times        Teodoro Vasques, JEAN PIERRE  01/30/18 2757

## 2018-01-31 NOTE — ED PROVIDER NOTES
History  Chief Complaint   Patient presents with    Eye Pain     Pt c/o right eye pain that started yesterday  Pt c/o burning sensation and reports drainage from the eye  Pt states that she can not see out of the eye but that is no change for her  Pt states "I can not keep my eye open due to the pain"  Patient is a 57-year-old female with a history of glaucoma and blindness in her right eye who presents for right eye pain  Patient says the pain started at 2:00 a m  this morning  She says it was sudden in onset and has progressively got worse  She denies fevers or chills  She admits to some clear drainage from her eye  She denies any trauma to her eye  She says that the light bothers her eye and is difficult to open her eye secondary to pain  She also admits to a headache behind her right eye  She admits to nausea but no vomiting  Prior to Admission Medications   Prescriptions Last Dose Informant Patient Reported? Taking? ALPRAZolam (XANAX) 0 25 mg tablet   Yes No   Sig: Take 0 125 mg by mouth every 4 (four) hours as needed for anxiety  CALCIUM CARBONATE PO   Yes No   Sig: Take 1,000 mg by mouth daily  PARoxetine (PAXIL) 10 mg tablet   Yes No   Sig: Take 20 mg by mouth every morning     amLODIPine (NORVASC) 10 mg tablet   Yes No   Sig: Take 10 mg by mouth daily  brimonidine (ALPHAGAN P) 0 15 % ophthalmic solution   Yes No   Sig: Administer 1 drop to both eyes 3 (three) times a day     carvedilol (COREG) 25 mg tablet   Yes No   Sig: Take 25 mg by mouth 2 (two) times a day with meals     chlorhexidine (HIBICLENS) 4 % external liquid   No No   Sig: Apply 1 application topically daily as needed for wound care   cloNIDine (CATAPRES) 0 1 mg tablet   Yes No   Sig: Take 0 1 mg by mouth 2 (two) times a day   gabapentin (NEURONTIN) 100 mg capsule   Yes No   Sig: Take 100 mg by mouth 3 (three) times a day     insulin aspart (NovoLOG) 100 units/mL injection   Yes No   Sig: Inject 1-5 Units under the skin 3 (three) times a day before meals As per sliding scale    insulin glargine (LANTUS) 100 units/mL subcutaneous injection   No No   Sig: Inject 24 Units under the skin daily at bedtime   latanoprost (XALATAN) 0 005 % ophthalmic solution   Yes No   Sig: Administer 1 drop to both eyes daily at bedtime   levothyroxine 50 mcg tablet   Yes No   Sig: Take 50 mcg by mouth daily  losartan (COZAAR) 100 MG tablet   Yes No   Sig: Take 100 mg by mouth daily  methazolamide (NEPTAZANE) 25 MG tablet   Yes No   Sig: Take 25 mg by mouth daily     ondansetron (ZOFRAN-ODT) 4 mg disintegrating tablet   No No   Sig: Take 1 tablet by mouth every 6 (six) hours as needed for nausea or vomiting   oxyCODONE-acetaminophen (PERCOCET) 5-325 mg per tablet   No No   Sig: Take 1 tablet by mouth every 4 (four) hours as needed for moderate pain for up to 4 doses Max Daily Amount: 6 tablets   pantoprazole (PROTONIX) 40 mg tablet   Yes No   Sig: Take 40 mg by mouth 2 (two) times a day  prednisoLONE acetate (PRED FORTE) 1 % ophthalmic suspension   Yes No   Sig: Administer 1 drop to both eyes 4 (four) times a day     torsemide (DEMADEX) 100 mg tablet   Yes No   Sig: Take 200 mg by mouth 2 (two) times a day        Facility-Administered Medications: None       Past Medical History:   Diagnosis Date    Abnormal uterine bleeding (AUB)     Diabetes [facility]     Disease of thyroid gland     [facility]     End stage kidney disease (HCC)     Foot ulcer due to secondary DM ([facility] )     Hypertension     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[facility] )     right eye    Morbid obesity (HCC)     Reflux esophagitis     Thrombophlebitis of saphenous vein     Warfarin anticoagulation        Past Surgical History:   Procedure Laterality Date    ARTERIOVENOUS GRAFT PLACEMENT      CERVICAL BIOPSY  W/ LOOP ELECTRODE EXCISION       SECTION      DIALYSIS FISTULA CREATION      DILATION AND CURETTAGE OF UTERUS  ENDOMETRIAL ABLATION W/ NOVASURE      PERICARDIUM SURGERY      [place] LAPAROSCOPY W TOT HYSTERECT UTERUS 250 GRAM OR LESS N/A 2/16/2016    Procedure: HYSTERECTOMY LAPAROSCOPIC TOTAL ([address]), with bilateral salpingectomy;  Surgeon: Solo Palomino DO;  Location: [facility];  Service: Gynecology    THROMBECTOMY / ARTERIOVENOUS GRAFT REVISION      TOE SURGERY Right     removal of the 4th toe       History reviewed  No pertinent family history  I have reviewed and agree with the history as documented  Social History   Substance Use Topics    Smoking status: Never Smoker    Smokeless tobacco: Never Used    Alcohol use No        Review of Systems   Constitutional: Negative for chills, diaphoresis and fever  HENT: Negative for congestion, sinus pressure, sore throat and trouble swallowing  Eyes: Positive for photophobia, pain, discharge and redness  Negative for itching  Respiratory: Negative for cough, chest tightness, shortness of breath and wheezing  Cardiovascular: Negative for chest pain, palpitations and leg swelling  Gastrointestinal: Negative for abdominal distention, abdominal pain, blood in stool, diarrhea, nausea and vomiting  Endocrine: Negative for polyphagia and polyuria  Genitourinary: Negative for difficulty urinating, dysuria, flank pain, hematuria, pelvic pain and vaginal bleeding  Musculoskeletal: Negative for arthralgias and back pain  Skin: Negative for rash  Neurological: Positive for headaches (Behind right eye)  Negative for dizziness, syncope, weakness and light-headedness         Physical Exam  ED Triage Vitals [01/30/18 1623]   Temperature Pulse Respirations Blood Pressure SpO2   97 5 °F (36 4 °C) 81 18 (!) 215/85 96 %      Temp Source Heart Rate Source Patient Position - Orthostatic VS BP Location FiO2 (%)   Oral Monitor Lying Right arm --      Pain Score       Worst Possible Pain           Orthostatic Vital Signs  Vitals:    01/30/18 1623 01/30/18 1800   BP: (!) 215/85 (!) 189/74   Pulse: 81 78   Patient Position - Orthostatic VS: Lying Sitting       Physical Exam   Constitutional: She is oriented to person, place, and time  She appears well-developed and well-nourished  No distress  HENT:   Head: Normocephalic and atraumatic  Eyes: Lids are normal  Right eye exhibits chemosis and discharge (purulent)  Right conjunctiva is injected  Right eye exhibits normal extraocular motion and no nystagmus  Right pupil is not reactive  Cornea is cloudy   Neck: Neck supple  Cardiovascular: Normal rate, regular rhythm and normal heart sounds  Exam reveals no gallop and no friction rub  No murmur heard  Pulmonary/Chest: Effort normal and breath sounds normal  No respiratory distress  Abdominal: Soft  She exhibits no distension  There is no tenderness  There is no guarding  Musculoskeletal: Normal range of motion  She exhibits no edema or deformity  Lymphadenopathy:     She has no cervical adenopathy  Neurological: She is alert and oriented to person, place, and time  Skin: Skin is warm and dry  Psychiatric: She has a normal mood and affect  Nursing note and vitals reviewed  ED Medications  Medications   proparacaine (ALCAINE) 0 5 % ophthalmic solution 1 drop (1 drop Right Eye Given 1/30/18 1725)   neomycin-bacitracin-polymyxin (NEOSPORIN) ophthalmic ointment 0 5 inch (0 5 inches Right Eye Given 1/30/18 1914)   acetaminophen (TYLENOL) tablet 650 mg (650 mg Oral Given 1/30/18 1914)       Diagnostic Studies  Results Reviewed     None                 No orders to display         Procedures  Procedures      Phone Consults  ED Phone Contact    ED Course  ED Course                                MDM  Number of Diagnoses or Management Options  Conjunctivitis:   Glaucoma:   Diagnosis management comments: 53-year-old female with history of glaucoma presents for right eye pain  Patient says it started abruptly at 2:00 a m  has gotten worse    Eye is injected with purulent drainage  Patient's average ocular pressure was 34  She is a patient of Dr Cristel Biswas  I spoke with his assistant who said that her eye pressures have been as high as 51  Was told to start patient on atropine drops, acetazolamide and have patient follow up outpatient  Started patient on neosporin eye ointment  Due to purulent drainage from eye  CritCare Time    Disposition  Final diagnoses:   Conjunctivitis   Glaucoma     Time reflects when diagnosis was documented in both MDM as applicable and the Disposition within this note     Time User Action Codes Description Comment    1/30/2018  5:59 PM Cleda Olive Add [H10 9] Conjunctivitis     1/30/2018  6:00 PM Cleda Olive Add [H40 9] Glaucoma       ED Disposition     ED Disposition Condition Comment    Discharge  Barb Tse discharge to home/self care  Condition at discharge: {DISCHARGE CONDITION: Good        Follow-up Information     Follow up With Specialties Details Why Contact Info Additional [address] Emergency Department Emergency Medicine  If symptoms worsen [address] [place] [address], [address] [place] [address], [facility]        Discharge Medication List as of 1/30/2018  6:05 PM      START taking these medications    Details   acetaZOLAMIDE (DIAMOX) 250 mg tablet Take 1 tablet (250 mg total) by mouth 2 (two) times a day for 7 days, Starting Tue 1/30/2018, Until Tue 2/6/2018, Print      atropine (ISOPTO ATROPINE) 1 % ophthalmic solution Administer 1 drop to the right eye 4 (four) times a day for 7 days, Starting Tue 1/30/2018, Until Tue 2/6/2018, Print      neomycin-bacitracin-polymyxin b (NEOSPORIN) ointment Apply topically 2 (two) times a day, Starting Tue 1/30/2018, Print         CONTINUE these medications which have NOT CHANGED    Details   ALPRAZolam (XANAX) 0 25 mg tablet Take 0 125 mg by mouth every 4 (four) hours as needed for anxiety  , Until Discontinued, Historical Med      amLODIPine (NORVASC) 10 mg tablet Take 10 mg by mouth daily  , Until Discontinued, Historical Med      brimonidine (ALPHAGAN P) 0 15 % ophthalmic solution Administer 1 drop to both eyes 3 (three) times a day  , Until Discontinued, Historical Med      CALCIUM CARBONATE PO Take 1,000 mg by mouth daily  , Until Discontinued, Historical Med      carvedilol (COREG) 25 mg tablet Take 25 mg by mouth 2 (two) times a day with meals  , Until Discontinued, Historical Med      chlorhexidine (HIBICLENS) 4 % external liquid Apply 1 application topically daily as needed for wound care, Starting Sat 9/9/2017, Print      cloNIDine (CATAPRES) 0 1 mg tablet Take 0 1 mg by mouth 2 (two) times a day, Until Discontinued, Historical Med      gabapentin (NEURONTIN) 100 mg capsule Take 100 mg by mouth 3 (three) times a day  , Until Discontinued, Historical Med      insulin aspart (NovoLOG) 100 units/mL injection Inject 1-5 Units under the skin 3 (three) times a day before meals As per sliding scale , Until Discontinued, Historical Med      insulin glargine (LANTUS) 100 units/mL subcutaneous injection Inject 24 Units under the skin daily at bedtime, Starting 2/19/2017, Until Discontinued, No Print      latanoprost (XALATAN) 0 005 % ophthalmic solution Administer 1 drop to both eyes daily at bedtime, Until Discontinued, Historical Med      levothyroxine 50 mcg tablet Take 50 mcg by mouth daily  , Until Discontinued, Historical Med      losartan (COZAAR) 100 MG tablet Take 100 mg by mouth daily  , Until Discontinued, Historical Med      methazolamide (NEPTAZANE) 25 MG tablet Take 25 mg by mouth daily  , Until Discontinued, Historical Med      ondansetron (ZOFRAN-ODT) 4 mg disintegrating tablet Take 1 tablet by mouth every 6 (six) hours as needed for nausea or vomiting, Starting u 8/10/2017, Print      oxyCODONE-acetaminophen (PERCOCET) 5-325 mg per tablet Take 1 tablet by mouth every 4 (four) hours as needed for moderate pain for up to 4 doses Max Daily Amount: 6 tablets, Starting Thu 8/10/2017, Print      pantoprazole (PROTONIX) 40 mg tablet Take 40 mg by mouth 2 (two) times a day , Until Discontinued, Historical Med      PARoxetine (PAXIL) 10 mg tablet Take 20 mg by mouth every morning  , Until Discontinued, Historical Med      prednisoLONE acetate (PRED FORTE) 1 % ophthalmic suspension Administer 1 drop to both eyes 4 (four) times a day  , Until Discontinued, Historical Med      torsemide (DEMADEX) 100 mg tablet Take 200 mg by mouth 2 (two) times a day , Until Discontinued, Historical Med      B Complex-C-Folic Acid (TRIPHROCAPS) 1 MG CAPS Take 1 capsule by mouth daily for 30 doses, Starting Sun 4/9/2017, Until Sat 9/9/2017, Print      Cholecalciferol (VITAMIN D-3) 1000 units CAPS Take 2 capsules by mouth daily for 30 doses, Starting Sun 4/9/2017, Until Sat 9/9/2017, Print      dorzolamide-timolol (COSOPT) 2-0 5 % ophthalmic solution Administer 1 drop to both eyes 2 (two) times a day  , Until Discontinued, Historical Med      sevelamer (RENAGEL) 800 mg tablet Take 1 tablet by mouth 3 (three) times a day with meals for 30 days, Starting Sun 4/9/2017, Until Sat 9/9/2017, Print      warfarin (COUMADIN) 5 mg tablet Take 1 tablet by mouth daily for 30 doses, Starting Sun 4/9/2017, Until Sat 9/9/2017, Print           No discharge procedures on file  ED Provider  Attending physically available and evaluated Arsalan Ambrosio I managed the patient along with the ED Attending      Electronically Signed by         Stew Nava DO  01/31/18 5354

## 2018-02-20 ENCOUNTER — APPOINTMENT (EMERGENCY)
Dept: RADIOLOGY | Facility: HOSPITAL | Age: 53
End: 2018-02-20
Payer: MEDICARE

## 2018-02-20 ENCOUNTER — HOSPITAL ENCOUNTER (EMERGENCY)
Facility: HOSPITAL | Age: 53
Discharge: HOME | End: 2018-02-20
Attending: EMERGENCY MEDICINE | Admitting: EMERGENCY MEDICINE
Payer: MEDICARE

## 2018-02-20 VITALS
HEART RATE: 77 BPM | BODY MASS INDEX: 41.78 KG/M2 | DIASTOLIC BLOOD PRESSURE: 99 MMHG | HEIGHT: 66 IN | TEMPERATURE: 98.9 F | SYSTOLIC BLOOD PRESSURE: 170 MMHG | RESPIRATION RATE: 18 BRPM | WEIGHT: 260 LBS | OXYGEN SATURATION: 98 %

## 2018-02-20 DIAGNOSIS — R07.81 RIB PAIN ON LEFT SIDE: ICD-10-CM

## 2018-02-20 DIAGNOSIS — Z92.29 HX OF LONG TERM USE OF BLOOD THINNERS: ICD-10-CM
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
## 2018-02-20 LAB
ALBUMIN SERPL BCP-MCNC: 2.9 G/DL (ref 3.5–5)
ALP SERPL-CCNC: 108 U/L (ref 46–116)
ALT SERPL W P-5'-P-CCNC: <6 U/L (ref 12–78)
ANION GAP SERPL CALCULATED.3IONS-SCNC: 6 MMOL/L (ref 4–13)
APTT PPP: 35 SECONDS (ref 23–35)
AST SERPL W P-5'-P-CCNC: 9 U/L (ref 5–45)
BASOPHILS # BLD AUTO: 0.05 THOUSANDS/ΜL (ref 0–0.1)
BASOPHILS NFR BLD AUTO: 1 % (ref 0–1)
BILIRUB SERPL-MCNC: 0.36 MG/DL (ref 0.2–1)
BUN SERPL-MCNC: 26 MG/DL (ref 5–25)
CALCIUM SERPL-MCNC: 8.3 MG/DL (ref 8.3–10.1)
CHLORIDE SERPL-SCNC: 98 MMOL/L (ref 100–108)
CO2 SERPL-SCNC: 34 MMOL/L (ref 21–32)
CREAT SERPL-MCNC: 5.09 MG/DL (ref 0.6–1.3)
EOSINOPHIL # BLD AUTO: 0.21 THOUSAND/ΜL (ref 0–0.61)
EOSINOPHIL NFR BLD AUTO: 3 % (ref 0–6)
ERYTHROCYTE [DISTWIDTH] IN BLOOD BY AUTOMATED COUNT: 13.6 % (ref 11.6–15.1)
GFR SERPL CREATININE-BSD FRML MDRD: 9 ML/MIN/1.73SQ M
GLUCOSE SERPL-MCNC: 141 MG/DL (ref 65–140)
HCT VFR BLD AUTO: 22.5 % (ref 34.8–46.1)
HGB BLD-MCNC: 7.5 G/DL (ref 11.5–15.4)
INR PPP: 1.87 (ref 0.86–1.16)
LYMPHOCYTES # BLD AUTO: 0.91 THOUSANDS/ΜL (ref 0.6–4.47)
LYMPHOCYTES NFR BLD AUTO: 14 % (ref 14–44)
MCH RBC QN AUTO: 31.8 PG (ref 26.8–34.3)
MCHC RBC AUTO-ENTMCNC: 33.3 G/DL (ref 31.4–37.4)
MCV RBC AUTO: 95 FL (ref 82–98)
MONOCYTES # BLD AUTO: 0.3 THOUSAND/ΜL (ref 0.17–1.22)
MONOCYTES NFR BLD AUTO: 5 % (ref 4–12)
NEUTROPHILS # BLD AUTO: 4.81 THOUSANDS/ΜL (ref 1.85–7.62)
NEUTS SEG NFR BLD AUTO: 77 % (ref 43–75)
NRBC BLD AUTO-RTO: 0 /100 WBCS
PLATELET # BLD AUTO: 172 THOUSANDS/UL (ref 149–390)
PMV BLD AUTO: 10.4 FL (ref 8.9–12.7)
POTASSIUM SERPL-SCNC: 3.7 MMOL/L (ref 3.5–5.3)
PROT SERPL-MCNC: 6.7 G/DL (ref 6.4–8.2)
PROTHROMBIN TIME: 21.7 SECONDS (ref 12.1–14.4)
RBC # BLD AUTO: 2.36 MILLION/UL (ref 3.81–5.12)
SODIUM SERPL-SCNC: 138 MMOL/L (ref 136–145)
WBC # BLD AUTO: 6.3 THOUSAND/UL (ref 4.31–10.16)

## 2018-02-20 PROCEDURE — 73030 X-RAY EXAM OF SHOULDER: CPT

## 2018-02-20 PROCEDURE — 99285 EMERGENCY DEPT VISIT HI MDM: CPT

## 2018-02-20 PROCEDURE — 93005 ELECTROCARDIOGRAM TRACING: CPT

## 2018-02-20 PROCEDURE — 70450 CT HEAD/BRAIN W/O DYE: CPT

## 2018-02-20 PROCEDURE — 85610 PROTHROMBIN TIME: CPT | Performed by: EMERGENCY MEDICINE

## 2018-02-20 PROCEDURE — 71250 CT THORAX DX C-: CPT

## 2018-02-20 PROCEDURE — 74176 CT ABD & PELVIS W/O CONTRAST: CPT

## 2018-02-20 PROCEDURE — 85025 COMPLETE CBC W/AUTO DIFF WBC: CPT | Performed by: EMERGENCY MEDICINE

## 2018-02-20 PROCEDURE — 85730 THROMBOPLASTIN TIME PARTIAL: CPT | Performed by: EMERGENCY MEDICINE

## 2018-02-20 PROCEDURE — 36415 COLL VENOUS BLD VENIPUNCTURE: CPT | Performed by: EMERGENCY MEDICINE

## 2018-02-20 PROCEDURE — 80053 COMPREHEN METABOLIC PANEL: CPT | Performed by: EMERGENCY MEDICINE

## 2018-02-20 RX ORDER — BRIMONIDINE TARTRATE 0.15 %
DROPS OPHTHALMIC (EYE)
COMMUNITY
Start: 2017-03-06 | End: 2018-02-20

## 2018-02-20 RX ORDER — FERRIC CITRATE 210 MG/1
TABLET, COATED ORAL
COMMUNITY
Start: 2017-03-29 | End: 2020-05-13 | Stop reason: SDUPTHER

## 2018-02-20 RX ORDER — LATANOPROST 50 UG/ML
SOLUTION/ DROPS OPHTHALMIC
COMMUNITY
Start: 2017-05-12 | End: 2018-02-20

## 2018-02-20 RX ORDER — ACETAMINOPHEN 325 MG/1
650 TABLET ORAL ONCE
Status: COMPLETED | OUTPATIENT
Start: 2018-02-20 | End: 2018-02-20

## 2018-02-20 RX ORDER — LEVOTHYROXINE SODIUM 0.05 MG/1
1 TABLET ORAL DAILY
COMMUNITY
Start: 2016-07-22 | End: 2018-02-20

## 2018-02-20 RX ORDER — PANTOPRAZOLE SODIUM 40 MG/1
1 TABLET, DELAYED RELEASE ORAL 2 TIMES DAILY
COMMUNITY
Start: 2014-07-16 | End: 2018-02-20

## 2018-02-20 RX ORDER — GABAPENTIN 100 MG/1
CAPSULE ORAL
COMMUNITY
Start: 2014-12-05 | End: 2018-02-20

## 2018-02-20 RX ORDER — FOLIC ACID/VIT B COMPLEX AND C 0.8 MG
TABLET ORAL
COMMUNITY
End: 2018-02-20

## 2018-02-20 RX ORDER — LIDOCAINE 50 MG/G
1 PATCH TOPICAL ONCE
Status: DISCONTINUED | OUTPATIENT
Start: 2018-02-20 | End: 2018-02-20 | Stop reason: HOSPADM

## 2018-02-20 RX ORDER — PAROXETINE 10 MG/1
TABLET, FILM COATED ORAL
COMMUNITY
End: 2018-02-20

## 2018-02-20 RX ORDER — LANTHANUM CARBONATE 500 MG/1
TABLET, CHEWABLE ORAL
COMMUNITY
End: 2018-05-22

## 2018-02-20 RX ORDER — DIPHENHYDRAMINE HCL 25 MG
CAPSULE ORAL AS NEEDED
COMMUNITY
End: 2021-10-25 | Stop reason: HOSPADM

## 2018-02-20 RX ORDER — WARFARIN SODIUM 5 MG/1
12.5 TABLET ORAL DAILY
Status: ON HOLD | COMMUNITY
Start: 2015-10-30 | End: 2019-04-27 | Stop reason: CLARIF

## 2018-02-20 RX ORDER — AMLODIPINE BESYLATE 10 MG/1
TABLET ORAL
COMMUNITY
End: 2018-02-20

## 2018-02-20 RX ORDER — LOSARTAN POTASSIUM 100 MG/1
1 TABLET ORAL DAILY
COMMUNITY
Start: 2011-11-28 | End: 2018-02-20

## 2018-02-20 RX ORDER — ACETAZOLAMIDE 250 MG/1
1 TABLET ORAL 2 TIMES DAILY
COMMUNITY
Start: 2017-12-13 | End: 2018-05-22

## 2018-02-20 RX ORDER — CLONIDINE HYDROCHLORIDE 0.1 MG/1
TABLET ORAL
COMMUNITY
Start: 2017-02-19 | End: 2018-02-20

## 2018-02-20 RX ORDER — TORSEMIDE 20 MG/1
100 TABLET ORAL
Status: ON HOLD | COMMUNITY
End: 2018-05-23

## 2018-02-20 RX ORDER — ALPRAZOLAM 0.25 MG/1
TABLET ORAL
COMMUNITY
Start: 2015-03-02 | End: 2018-02-20

## 2018-02-20 RX ADMIN — LIDOCAINE 1 PATCH: 50 PATCH TOPICAL at 19:11

## 2018-02-20 RX ADMIN — ACETAMINOPHEN 650 MG: 325 TABLET, FILM COATED ORAL at 19:11

## 2018-02-20 NOTE — ED PROVIDER NOTES
History  Chief Complaint   Patient presents with    Fall     fall yesterday walking out of [facility] was not a mechanical fall as per patient- she just fell, this happens to her as per patient, pt has pain in her left chest wall pain, left arm pain, pain with ROM pt need O2 at dialysis today and was told to come to the ED to be seen by the dialysis staff      Mechanical fall outside of [facility]  States she falls frequently; she fell forward  She struck her head on the right side; landed on her chest and belly  Later on that evening she felt she was sore on her left shoulder and chest   She states it hurts to move her arm and also when she takes a deep breath hurts along her left rib cage  She is on Coumadin; for history of DVT  She is end-stage dialysis; but is not anuric  She does have hypertension along with neuropathy and depression  She does have a right eye cover supposed to have surgery on it this Tuesday  She states was no loss of consciousness  She states she developed this pain overnight went to dialysis this morning they told her to come in to be evaluated  Had 3 hr tx this AM   She denies cough runny nose; or shortness of breath  She denies new abdominal pain  Her vital signs unremarkable except for some hypertension  On exam she has pain all along the left rib cage and left shoulder  She also has pain in the left upper quadrant  She denies lightheadedness; or dizziness  Patient appears well  No external signs of trauma  States renal disease is not reversible  States has had IV contrast before  States in the future goal is for her to be on transplant list she states  Prior to Admission Medications   Prescriptions Last Dose Informant Patient Reported? Taking? ALPRAZolam (XANAX) 0 25 mg tablet 2/19/2018 at Unknown time  Yes Yes   Sig: Take 0 125 mg by mouth every 4 (four) hours as needed for anxiety     B Complex-C-Folic Acid (TRIPHROCAPS PO) 2/19/2018 at Unknown time  Yes Yes   Sig: Take 1 capsule by mouth daily   CALCIUM CARBONATE PO Past Week at Unknown time  Yes Yes   Sig: Take 1,000 mg by mouth daily  Ferric Citrate (AURYXIA) 1  MG(Fe) TABS 2/19/2018 at Unknown time  Yes Yes   Sig: Take by mouth   Melatonin 3 MG CAPS 2/19/2018 at Unknown time  Yes Yes   Sig: Take by mouth   PARoxetine (PAXIL) 10 mg tablet 2/19/2018 at Unknown time  Yes Yes   Sig: Take 20 mg by mouth every morning     acetaZOLAMIDE (DIAMOX) 250 mg tablet 2/19/2018 at Unknown time  Yes Yes   Sig: Take 1 tablet by mouth 2 (two) times a day   albuterol (5 mg/mL) 0 5 % nebulizer solution 2/19/2018 at Unknown time  Yes Yes   Sig: Inhale   amLODIPine (NORVASC) 10 mg tablet 2/19/2018 at Unknown time  Yes Yes   Sig: Take 10 mg by mouth daily  brimonidine (ALPHAGAN P) 0 15 % ophthalmic solution 2/19/2018 at Unknown time  Yes Yes   Sig: Administer 1 drop to both eyes 3 (three) times a day     carvedilol (COREG) 25 mg tablet 2/19/2018 at Unknown time  Yes Yes   Sig: Take 25 mg by mouth 2 (two) times a day with meals     chlorhexidine (HIBICLENS) 4 % external liquid More than a month at Unknown time  No No   Sig: Apply 1 application topically daily as needed for wound care   cloNIDine (CATAPRES) 0 1 mg tablet 2/19/2018 at Unknown time  Yes Yes   Sig: Take 0 1 mg by mouth 2 (two) times a day   diphenhydrAMINE (BENADRYL) 25 mg capsule Past Month at Unknown time  Yes Yes   Sig: Take by mouth   gabapentin (NEURONTIN) 100 mg capsule 2/19/2018 at Unknown time  Yes Yes   Sig: Take 100 mg by mouth 3 (three) times a day     insulin aspart (NovoLOG) 100 units/mL injection 2/19/2018 at Unknown time  Yes Yes   Sig: Inject 1-5 Units under the skin 3 (three) times a day before meals As per sliding scale    insulin glargine (LANTUS) 100 units/mL subcutaneous injection 2/19/2018 at Unknown time  No Yes   Sig: Inject 24 Units under the skin daily at bedtime   lanthanum (FOSRENOL) 500 mg chewable tablet 2/19/2018 at Unknown time  Yes Yes   Sig: Chew   latanoprost (XALATAN) 0 005 % ophthalmic solution 2018 at Unknown time  Yes Yes   Sig: Administer 1 drop to both eyes daily at bedtime   levothyroxine 50 mcg tablet 2018 at Unknown time  Yes Yes   Sig: Take 50 mcg by mouth daily  losartan (COZAAR) 100 MG tablet 2018 at Unknown time  Yes Yes   Sig: Take 100 mg by mouth daily  magnesium oxide (MAG-OX) 400 mg 2018 at Unknown time  Yes Yes   Sig: Take 1 tablet by mouth 2 (two) times a day   methazolamide (NEPTAZANE) 25 MG tablet 2018 at Unknown time  Yes Yes   Sig: Take 25 mg by mouth daily     neomycin-bacitracin-polymyxin b (NEOSPORIN) ointment Past Month at Unknown time  No Yes   Sig: Apply topically 2 (two) times a day   pantoprazole (PROTONIX) 40 mg tablet 2018 at Unknown time  Yes Yes   Sig: Take 40 mg by mouth 2 (two) times a day     prednisoLONE acetate (PRED FORTE) 1 % ophthalmic suspension 2018 at Unknown time  Yes Yes   Sig: Administer 1 drop to both eyes 4 (four) times a day     torsemide (DEMADEX) 20 mg tablet Past Week at Unknown time  Yes Yes   Sig: Take by mouth   warfarin (COUMADIN) 5 mg tablet 2018 at Unknown time  Yes Yes   Sig: Take 1 tablet by mouth daily      Facility-Administered Medications: None       Past Medical History:   Diagnosis Date    Abnormal uterine bleeding (AUB)     Diabetes mellitus ([facility]     Disease of thyroid gland     DVT (deep venous thrombosis) (HCC)     End stage kidney disease ([facility] )     Foot ulcer due to secondary DM ([facility] )     Hypertension     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[facility] )     right eye    Morbid obesity ([facility] )     Reflux esophagitis     Thrombophlebitis of saphenous vein     Warfarin anticoagulation        Past Surgical History:   Procedure Laterality Date    ARTERIOVENOUS GRAFT PLACEMENT      CERVICAL BIOPSY  W/ LOOP ELECTRODE EXCISION       SECTION      DIALYSIS FISTULA CREATION      DILATION AND CURETTAGE OF UTERUS      ENDOMETRIAL ABLATION W/ NOVASURE      PERICARDIUM SURGERY      MA LAPAROSCOPY W TOT HYSTERECT UTERUS 250 GRAM OR LESS N/A 2/16/2016    Procedure: HYSTERECTOMY LAPAROSCOPIC TOTAL ([address]), with bilateral salpingectomy;  Surgeon: Brad Nolan DO;  Location: [facility];  Service: Gynecology    THROMBECTOMY / ARTERIOVENOUS GRAFT REVISION      TOE SURGERY Right     removal of the 4th toe       History reviewed  No pertinent family history  I have reviewed and agree with the history as documented  Social History   Substance Use Topics    Smoking status: Never Smoker    Smokeless tobacco: Never Used    Alcohol use No        Review of Systems   Constitutional: Negative for activity change, appetite change, chills and fever  HENT: Negative for congestion, ear pain, rhinorrhea, sore throat and trouble swallowing  Eyes: Negative for photophobia and pain  Respiratory: Negative for cough, chest tightness, shortness of breath and wheezing  Cardiovascular: Negative for chest pain and palpitations  Rib pain left side   Gastrointestinal: Negative for abdominal distention, abdominal pain, constipation, diarrhea, nausea and vomiting  Genitourinary: Negative for dysuria, flank pain, hematuria and urgency  Musculoskeletal: Positive for myalgias  Negative for arthralgias, back pain, joint swelling, neck pain and neck stiffness  Skin: Negative for color change, pallor and rash  Neurological: Negative for dizziness, seizures, weakness, light-headedness and headaches  Hematological: Negative for adenopathy  Psychiatric/Behavioral: Negative for confusion, hallucinations and self-injury         Physical Exam  ED Triage Vitals [02/20/18 1610]   Temperature Pulse Respirations Blood Pressure SpO2   98 9 °F (37 2 °C) 78 18 (!) 175/113 98 %      Temp Source Heart Rate Source Patient Position - Orthostatic VS BP Location FiO2 (%)   Tympanic -- -- -- --      Pain Score       Worst Possible Pain           Orthostatic Vital Signs  Vitals:    02/20/18 1610 02/20/18 1832   BP: (!) 175/113 170/99   Pulse: 78 77       Physical Exam   Constitutional: She is oriented to person, place, and time  She appears well-developed and well-nourished  No distress  HENT:   Head: Normocephalic and atraumatic  Right Ear: External ear normal    Left Ear: External ear normal    Mouth/Throat: Oropharynx is clear and moist  No oropharyngeal exudate  Eyes: Right eye exhibits no discharge  Left eye exhibits no discharge  Right eye has patch with injection around the eye  States is having surgery on Tuesday  Left eye EOMI  Neck: Normal range of motion  Neck supple  No tracheal deviation present  No thyromegaly present  No midline tenderness  No neck discomfort in general   Cardiovascular: Normal rate, normal heart sounds and intact distal pulses  No murmur heard  Pulmonary/Chest: Effort normal and breath sounds normal  No respiratory distress  She has no wheezes  She has no rales  Clear bilateral   Abdominal: Soft  Bowel sounds are normal  She exhibits no distension  There is no tenderness  There is no rebound and no guarding  Mild left upper quadrant abdominal pain and left lower rib pain  Genitourinary:   Genitourinary Comments: JOSE is heme-negative  Musculoskeletal: Normal range of motion  She exhibits no edema, tenderness or deformity  Pain over left shoulder will not lift up left  Pain long left rib cage in center of chest   Very tender to light palpation   Lymphadenopathy:     She has no cervical adenopathy  Neurological: She is alert and oriented to person, place, and time  No cranial nerve deficit  She exhibits normal muscle tone  Skin: Skin is warm  Capillary refill takes less than 2 seconds  No rash noted  She is not diaphoretic  No erythema  No pallor  Psychiatric: She has a normal mood and affect   Her behavior is normal  Judgment and thought content normal        ED Medications  Medications   acetaminophen (TYLENOL) tablet 650 mg (650 mg Oral Given 2/20/18 1911)       Diagnostic Studies  Results Reviewed     Procedure Component Value Units Date/Time    Comprehensive metabolic panel [[id number]]  (Abnormal) Collected:  02/20/18 1648    Lab Status:  Final result Specimen:  Blood from Arm, Right Updated:  02/20/18 1805     Sodium 138 mmol/L      Potassium 3 7 mmol/L      Chloride 98 (L) mmol/L      CO2 34 (H) mmol/L      Anion Gap 6 mmol/L      BUN 26 (H) mg/dL      Creatinine 5 09 (H) mg/dL      Glucose 141 (H) mg/dL      Calcium 8 3 mg/dL      AST 9 U/L      ALT <6 (L) U/L      Alkaline Phosphatase 108 U/L      Total Protein 6 7 g/dL      Albumin 2 9 (L) g/dL      Total Bilirubin 0 36 mg/dL      eGFR 9 ml/min/1 73sq m     Narrative:         National Kidney Disease Education Program recommendations are as follows:  GFR calculation is accurate only with a steady state creatinine  Chronic Kidney disease less than 60 ml/min/1 73 sq  meters  Kidney failure less than 15 ml/min/1 73 sq  meters  APTT [[id number]]  (Normal) Collected:  02/20/18 1648    Lab Status:  Final result Specimen:  Blood from Arm, Right Updated:  02/20/18 1716     PTT 35 seconds     Narrative:          Therapeutic Heparin Range = 60-90 seconds    Protime-INR [[id number]]  (Abnormal) Collected:  02/20/18 1648    Lab Status:  Final result Specimen:  Blood from Arm, Right Updated:  02/20/18 1716     Protime 21 7 (H) seconds      INR 1 87 (H)    CBC and differential [[id number]]  (Abnormal) Collected:  02/20/18 1648    Lab Status:  Final result Specimen:  Blood from Arm, Right Updated:  02/20/18 1701     WBC 6 30 Thousand/uL      RBC 2 36 (L) Million/uL      Hemoglobin 7 5 (L) g/dL      Hematocrit 22 5 (L) %      MCV 95 fL      MCH 31 8 pg      MCHC 33 3 g/dL      RDW 13 6 %      MPV 10 4 fL      Platelets 501 Thousands/uL      nRBC 0 /100 WBCs      Neutrophils Relative 77 (H) %      Lymphocytes Relative 14 %      Monocytes Relative 5 %      Eosinophils Relative 3 %      Basophils Relative 1 %      Neutrophils Absolute 4 81 Thousands/µL      Lymphocytes Absolute 0 91 Thousands/µL      Monocytes Absolute 0 30 Thousand/µL      Eosinophils Absolute 0 21 Thousand/µL      Basophils Absolute 0 05 Thousands/µL                  XR shoulder 2+ views LEFT   ED Interpretation by Berry Ortiz DO (02/20 1801)   Wet read no acute findings      Final Result by Judith Drake MD (02/20 1857)      No acute osseous abnormality  Workstation performed: [id number]         CT head without contrast   Final Result by Yaya Mckay MD (02/20 8505)      No acute intracranial abnormality  Microangiopathic changes  Workstation performed: [id number]         CT chest abdomen pelvis wo contrast   Final Result by Yaya Mckay MD (02/20 2298)      No acute CT findings  Chronic findings as described above                 Workstation performed: [id number]               Procedures  ECG 12 Lead Documentation  Date/Time: 2/21/2018 12:04 AM  Performed by: Harris Lugo  Authorized by: Alexus Bernal     Indications / Diagnosis:  Cp  ECG reviewed by me, the ED Provider: yes    Patient location:  ED  Previous ECG:     Previous ECG:  Compared to current    Similarity:  No change  Interpretation:     Interpretation: normal    Rhythm:     Rhythm: sinus rhythm    Ectopy:     Ectopy: none    QRS:     QRS axis:  Normal    QRS intervals:  Normal  Conduction:     Conduction: normal    ST segments:     ST segments:  Normal  T waves:     T waves: normal            Phone Consults  ED Phone Contact    ED Course  ED Course as of Feb 21 0008 Tue Feb 20, 2018   1850 Heme negative rectal exam                                 MDM  Number of Diagnoses or Management Options  Fall, initial encounter:   Hx of long term use of blood thinners:   Rib pain on left side:   Diagnosis management comments: Patient with fall yesterday and now today has left-sided rib pain worse with movement  Did check INR she is on Coumadin and it is near appropriate limits  Mild left upper abdominal pain  In setting of fall and rib pain did perform CT chest abdomen pelvis  There is no evidence of rib fracture  There is no evidence of splenic injury  There is no evidence of abdominal free fluid per read  CT head also performed which was negative  Has had low hemoglobin prior as has anemia of chronic disease  Did do a rectal exam as hemoglobin was slightly lower from prior; with history of diverticulosis  But stool is heme negative  Given that she has benign abdominal exam no mention of free fluid or splenic injury and fall occurred yesterday do not suspect intra-abdominal injury leading to anemia  Rectal exam negative  Did stress the importance of following up with PCP in 24 hours to have hemoglobin Re checked  Suspect related to chronic disease  Hemodynamically stable  Lidocaine patch and Tylenol for pain  Given incentive spirometer due to likely rib contusion and educated on importance of use to prevent pneumonia/atelectasis  Amount and/or Complexity of Data Reviewed  Decide to obtain previous medical records or to obtain history from someone other than the patient: yes      CritCare Time    Disposition  Final diagnoses:   Fall, initial encounter   Rib pain on left side   Hx of long term use of blood thinners     Time reflects when diagnosis was documented in both MDM as applicable and the Disposition within this note     Time User Action Codes Description Comment    2/20/2018  7:12 PM Venda So Add [V36  XXXA] Fall, initial encounter     2/20/2018  7:12 PM Venda So Add [R07 81] Rib pain on left side     2/20/2018  7:12 PM Venda So Add [Z79 01] Hx of long term use of blood thinners       ED Disposition     ED Disposition Condition Comment    Discharge  Rosalva Segura discharge to home/self care      Condition at discharge: Good        Follow-up Information     Follow up With Specialties Details Why Contact Info    Nehal Butler MD Internal Medicine Schedule an appointment as soon as possible for a visit Must see in 24 hrs repeat HGB in 2 days - [facility] 7 5  [address] [facility]  [place] [address] [place] [id number]  [phone]          Discharge Medication List as of 2/20/2018  7:15 PM      CONTINUE these medications which have NOT CHANGED    Details   acetaZOLAMIDE (DIAMOX) 250 mg tablet Take 1 tablet by mouth 2 (two) times a day, Starting Wed 12/13/2017, Historical Med      albuterol (5 mg/mL) 0 5 % nebulizer solution Inhale, Historical Med      ALPRAZolam (XANAX) 0 25 mg tablet Take 0 125 mg by mouth every 4 (four) hours as needed for anxiety  , Until Discontinued, Historical Med      amLODIPine (NORVASC) 10 mg tablet Take 10 mg by mouth daily  , Until Discontinued, Historical Med      B Complex-C-Folic Acid (TRIPHROCAPS PO) Take 1 capsule by mouth daily, Starting Wed 1/13/2016, Historical Med      brimonidine (ALPHAGAN P) 0 15 % ophthalmic solution Administer 1 drop to both eyes 3 (three) times a day  , Until Discontinued, Historical Med      CALCIUM CARBONATE PO Take 1,000 mg by mouth daily  , Until Discontinued, Historical Med      carvedilol (COREG) 25 mg tablet Take 25 mg by mouth 2 (two) times a day with meals  , Until Discontinued, Historical Med      cloNIDine (CATAPRES) 0 1 mg tablet Take 0 1 mg by mouth 2 (two) times a day, Until Discontinued, Historical Med      diphenhydrAMINE (BENADRYL) 25 mg capsule Take by mouth, Historical Med      Ferric Citrate (AURYXIA) 1  MG(Fe) TABS Take by mouth, Starting Wed 3/29/2017, Historical Med      gabapentin (NEURONTIN) 100 mg capsule Take 100 mg by mouth 3 (three) times a day  , Until Discontinued, Historical Med      insulin aspart (NovoLOG) 100 units/mL injection Inject 1-5 Units under the skin 3 (three) times a day before meals As per sliding scale , Until Discontinued, Historical Med      insulin glargine (LANTUS) 100 units/mL subcutaneous injection Inject 24 Units under the skin daily at bedtime, Starting 2/19/2017, Until Discontinued, No Print      lanthanum (FOSRENOL) 500 mg chewable tablet Chew, Historical Med      latanoprost (XALATAN) 0 005 % ophthalmic solution Administer 1 drop to both eyes daily at bedtime, Until Discontinued, Historical Med      levothyroxine 50 mcg tablet Take 50 mcg by mouth daily  , Until Discontinued, Historical Med      losartan (COZAAR) 100 MG tablet Take 100 mg by mouth daily  , Until Discontinued, Historical Med      magnesium oxide (MAG-OX) 400 mg Take 1 tablet by mouth 2 (two) times a day, Starting Tue 8/26/2014, Historical Med      Melatonin 3 MG CAPS Take by mouth, Historical Med      methazolamide (NEPTAZANE) 25 MG tablet Take 25 mg by mouth daily  , Until Discontinued, Historical Med      neomycin-bacitracin-polymyxin b (NEOSPORIN) ointment Apply topically 2 (two) times a day, Starting Tue 1/30/2018, Print      pantoprazole (PROTONIX) 40 mg tablet Take 40 mg by mouth 2 (two) times a day , Until Discontinued, Historical Med      PARoxetine (PAXIL) 10 mg tablet Take 20 mg by mouth every morning  , Until Discontinued, Historical Med      prednisoLONE acetate (PRED FORTE) 1 % ophthalmic suspension Administer 1 drop to both eyes 4 (four) times a day  , Until Discontinued, Historical Med      torsemide (DEMADEX) 20 mg tablet Take by mouth, Historical Med      warfarin (COUMADIN) 5 mg tablet Take 1 tablet by mouth daily, Starting Fri 10/30/2015, Historical Med      chlorhexidine (HIBICLENS) 4 % external liquid Apply 1 application topically daily as needed for wound care, Starting Sat 9/9/2017, Print           No discharge procedures on file  ED Provider  Attending physically available and evaluated Barb Tse  MANISH managed the patient along with the ED Attending      Electronically Signed by         Paula Reynolds DO  02/21/18 0008

## 2018-02-21 NOTE — DISCHARGE INSTRUCTIONS
Use incentive spirometer to prevent lungs from getting backed or filled with fluid  Here hemoglobin was 7 6  You should get it checked in 2 days  Rectal exam was done and no blo emergency department to have your blood level Re checked  od in stool  CT scan was done of chest abdomen and pelvis with no evidence of blood  Your blood level was this low in the past   Following up in 24 hours is very important  If he cannot follow up in 24 hours he should be seen here or another emergency department to have checked  Rib Contusion   WHAT YOU NEED TO KNOW:   A rib contusion is a bruise on one or more of your ribs  DISCHARGE INSTRUCTIONS:   Return to the emergency department if:   · You have increased chest pain  · You have shortness of breath  · You start to cough up blood  · Your pain does not improve with pain medicine  Contact your healthcare provider if:   · You have a cough  · You have a fever  · You have questions or concerns about your condition or care  Medicines: You may need any of the following:  · NSAIDs , such as ibuprofen, help decrease swelling, pain, and fever  This medicine is available with or without a doctor's order  NSAIDs can cause stomach bleeding or kidney problems in certain people  If you take blood thinner medicine, always ask if NSAIDs are safe for you  Always read the medicine label and follow directions  Do not give these medicines to children under 10months of age without direction from your child's healthcare provider  · Prescription pain medicine  may be given  Ask how to take this medicine safely  · Take your medicine as directed  Contact your healthcare provider if you think your medicine is not helping or if you have side effects  Tell him of her if you are allergic to any medicine  Keep a list of the medicines, vitamins, and herbs you take  Include the amounts, and when and why you take them   Bring the list or the pill bottles to follow-up visits  Carry your medicine list with you in case of an emergency  Deep breathing:   · To help prevent pneumonia, take 10 deep breaths every hour, even when you wake up during the night  Brace your ribs with your hands or a pillow while you take deep breaths or cough  This will help decrease your pain  · You may need to use an incentive spirometer to help you take deeper breaths  Put the plastic piece into your mouth and take a very deep breath  Hold your breath as long as you can  Then let out your breath  Do this 10 times in a row every hour while you are awake  Rest:  Rest your ribs to decrease swelling and allow the injury to heal faster  Avoid activities that may cause more pain or damage to your ribs  As your pain decreases, begin movements slowly  Ice:  Ice helps decrease swelling and pain  Ice may also help prevent tissue damage  Use an ice pack or put crushed ice in a plastic bag  Cover it with a towel and place it on your bruised area for 15 to 20 minutes every hour as directed  Follow up with your healthcare provider as directed:  Write down your questions so you remember to ask them during your visits  © 2017 [employer] Information is for End User's use only and may not be sold, redistributed or otherwise used for commercial purposes  All illustrations and images included in CareNotes® are the copyrighted property of [employer]  or Warren Weinstein  The above information is an  only  It is not intended as medical advice for individual conditions or treatments  Talk to your doctor, nurse or pharmacist before following any medical regimen to see if it is safe and effective for you

## 2018-02-21 NOTE — ED ATTENDING ATTESTATION
Lizbet Phillip MD, saw and evaluated the patient  I have discussed the patient with the resident/non-physician practitioner and agree with the resident's/non-physician practitioner's findings, Plan of Care, and MDM as documented in the resident's/non-physician practitioner's note, except where noted  All available labs and Radiology studies were reviewed  At this point I agree with the current assessment done in the Emergency Department  I have conducted an independent evaluation of this patient a history and physical is as follows:   The patient has a history DVT and is on Coumadin, patient also has history of end-stage renal disease and receives dialysis she received full dialysis today  The patient states that she fell yesterday outside at [facility] where she was visiting a family member she fell into her chest and complains of chest pain and upper abdominal pain  She has had no complaints of cough or shortness of breath she has had no fever or chills she has had no vomiting or diarrhea  Exam patient is in no acute distress she is overweight she is somewhat pale appearing HEENT is clear anicteric normocephalic atraumatic neck nontender lungs clear chest wall tenderness noted over the sternal area and the left anterior chest wall there is no crepitation noted no bruising is noted abdomen there is very minimal tenderness in the left upper quadrant without rebound or guarding back is nontender  Labs reveal a hemoglobin 7 5 her baseline runs from 7 8-9 1   A rectal exam was performed she has heme-negative stool  EKG normal sinus rhythm no ischemic changes  CT of chest abdomen pelvis showed no rib fractures no abdominal injury and no free fluid and no thoracic injury  Impression:  Fall chest wall pain  Critical Care Time  CritCare Time    Procedures

## 2018-02-26 LAB
ATRIAL RATE: 78 BPM
P AXIS: 60 DEGREES
PR INTERVAL: 158 MS
QRS AXIS: 38 DEGREES
QRSD INTERVAL: 86 MS
QT INTERVAL: 390 MS
QTC INTERVAL: 444 MS
T WAVE AXIS: 55 DEGREES
VENTRICULAR RATE: 78 BPM

## 2018-02-27 ENCOUNTER — TRANSCRIBE ORDERS (OUTPATIENT)
Dept: LAB | Facility: CLINIC | Age: 53
End: 2018-02-27

## 2018-02-27 ENCOUNTER — APPOINTMENT (EMERGENCY)
Dept: RADIOLOGY | Facility: HOSPITAL | Age: 53
End: 2018-02-27
Payer: MEDICARE

## 2018-02-27 ENCOUNTER — APPOINTMENT (OUTPATIENT)
Dept: LAB | Facility: CLINIC | Age: 53
End: 2018-02-27
Payer: MEDICARE

## 2018-02-27 ENCOUNTER — HOSPITAL ENCOUNTER (EMERGENCY)
Facility: HOSPITAL | Age: 53
Discharge: HOME | End: 2018-02-27
Attending: EMERGENCY MEDICINE | Admitting: EMERGENCY MEDICINE
Payer: MEDICARE

## 2018-02-27 VITALS
OXYGEN SATURATION: 94 % | TEMPERATURE: 97.6 F | SYSTOLIC BLOOD PRESSURE: 159 MMHG | DIASTOLIC BLOOD PRESSURE: 68 MMHG | WEIGHT: 260 LBS | HEART RATE: 78 BPM | BODY MASS INDEX: 41.97 KG/M2 | RESPIRATION RATE: 18 BRPM

## 2018-02-27 DIAGNOSIS — R06.00 DYSPNEA: ICD-10-CM

## 2018-02-27 DIAGNOSIS — Z79.01 LONG-TERM (CURRENT) USE OF ANTICOAGULANTS: Primary | ICD-10-CM

## 2018-02-27 DIAGNOSIS — Z79.01 LONG-TERM (CURRENT) USE OF ANTICOAGULANTS: ICD-10-CM

## 2018-02-27 DIAGNOSIS — E78.5 HYPERLIPIDEMIA, UNSPECIFIED HYPERLIPIDEMIA TYPE: ICD-10-CM

## 2018-02-27 DIAGNOSIS — R53.83 FATIGUE: Primary | ICD-10-CM

## 2018-02-27 LAB
ABO GROUP BLD: NORMAL
ALBUMIN SERPL BCP-MCNC: 3.2 G/DL (ref 3.5–5)
ALP SERPL-CCNC: 109 U/L (ref 46–116)
ALT SERPL W P-5'-P-CCNC: <6 U/L (ref 12–78)
ANION GAP SERPL CALCULATED.3IONS-SCNC: 7 MMOL/L (ref 4–13)
APTT PPP: 35 SECONDS (ref 23–35)
AST SERPL W P-5'-P-CCNC: 9 U/L (ref 5–45)
ATRIAL RATE: 79 BPM
BASOPHILS # BLD AUTO: 0.03 THOUSANDS/ΜL (ref 0–0.1)
BASOPHILS NFR BLD AUTO: 1 % (ref 0–1)
BILIRUB SERPL-MCNC: 0.52 MG/DL (ref 0.2–1)
BLD GP AB SCN SERPL QL: NEGATIVE
BUN SERPL-MCNC: 28 MG/DL (ref 5–25)
CALCIUM SERPL-MCNC: 8.2 MG/DL (ref 8.3–10.1)
CHLORIDE SERPL-SCNC: 98 MMOL/L (ref 100–108)
CHOLEST SERPL-MCNC: 205 MG/DL (ref 50–200)
CO2 SERPL-SCNC: 33 MMOL/L (ref 21–32)
CREAT SERPL-MCNC: 5.1 MG/DL (ref 0.6–1.3)
EOSINOPHIL # BLD AUTO: 0.21 THOUSAND/ΜL (ref 0–0.61)
EOSINOPHIL NFR BLD AUTO: 3 % (ref 0–6)
ERYTHROCYTE [DISTWIDTH] IN BLOOD BY AUTOMATED COUNT: 13.5 % (ref 11.6–15.1)
GFR SERPL CREATININE-BSD FRML MDRD: 9 ML/MIN/1.73SQ M
GLUCOSE SERPL-MCNC: 176 MG/DL (ref 65–140)
HCT VFR BLD AUTO: 23 % (ref 34.8–46.1)
HDLC SERPL-MCNC: 33 MG/DL (ref 40–60)
HGB BLD-MCNC: 7.6 G/DL (ref 11.5–15.4)
INR PPP: 1.45 (ref 0.86–1.16)
INR PPP: 1.55 (ref 0.86–1.16)
LDLC SERPL CALC-MCNC: 135 MG/DL (ref 0–100)
LYMPHOCYTES # BLD AUTO: 0.72 THOUSANDS/ΜL (ref 0.6–4.47)
LYMPHOCYTES NFR BLD AUTO: 11 % (ref 14–44)
MCH RBC QN AUTO: 31.5 PG (ref 26.8–34.3)
MCHC RBC AUTO-ENTMCNC: 33 G/DL (ref 31.4–37.4)
MCV RBC AUTO: 95 FL (ref 82–98)
MONOCYTES # BLD AUTO: 0.42 THOUSAND/ΜL (ref 0.17–1.22)
MONOCYTES NFR BLD AUTO: 7 % (ref 4–12)
NEUTROPHILS # BLD AUTO: 5.06 THOUSANDS/ΜL (ref 1.85–7.62)
NEUTS SEG NFR BLD AUTO: 78 % (ref 43–75)
NRBC BLD AUTO-RTO: 0 /100 WBCS
P AXIS: 67 DEGREES
PLATELET # BLD AUTO: 198 THOUSANDS/UL (ref 149–390)
PMV BLD AUTO: 10.3 FL (ref 8.9–12.7)
POTASSIUM SERPL-SCNC: 3.6 MMOL/L (ref 3.5–5.3)
PR INTERVAL: 174 MS
PROT SERPL-MCNC: 7.5 G/DL (ref 6.4–8.2)
PROTHROMBIN TIME: 17.7 SECONDS (ref 12.1–14.4)
PROTHROMBIN TIME: 18.7 SECONDS (ref 12.1–14.4)
QRS AXIS: 77 DEGREES
QRSD INTERVAL: 98 MS
QT INTERVAL: 412 MS
QTC INTERVAL: 472 MS
RBC # BLD AUTO: 2.41 MILLION/UL (ref 3.81–5.12)
RH BLD: POSITIVE
SODIUM SERPL-SCNC: 138 MMOL/L (ref 136–145)
SPECIMEN EXPIRATION DATE: NORMAL
T WAVE AXIS: 84 DEGREES
TRIGL SERPL-MCNC: 186 MG/DL
TSH SERPL DL<=0.05 MIU/L-ACNC: 2.05 UIU/ML (ref 0.36–3.74)
VENTRICULAR RATE: 79 BPM
WBC # BLD AUTO: 6.46 THOUSAND/UL (ref 4.31–10.16)

## 2018-02-27 PROCEDURE — 36415 COLL VENOUS BLD VENIPUNCTURE: CPT

## 2018-02-27 PROCEDURE — 80053 COMPREHEN METABOLIC PANEL: CPT | Performed by: EMERGENCY MEDICINE

## 2018-02-27 PROCEDURE — 85025 COMPLETE CBC W/AUTO DIFF WBC: CPT | Performed by: EMERGENCY MEDICINE

## 2018-02-27 PROCEDURE — 85610 PROTHROMBIN TIME: CPT

## 2018-02-27 PROCEDURE — 86900 BLOOD TYPING SEROLOGIC ABO: CPT | Performed by: EMERGENCY MEDICINE

## 2018-02-27 PROCEDURE — 84443 ASSAY THYROID STIM HORMONE: CPT | Performed by: EMERGENCY MEDICINE

## 2018-02-27 PROCEDURE — 99285 EMERGENCY DEPT VISIT HI MDM: CPT

## 2018-02-27 PROCEDURE — 71046 X-RAY EXAM CHEST 2 VIEWS: CPT

## 2018-02-27 PROCEDURE — 86850 RBC ANTIBODY SCREEN: CPT | Performed by: EMERGENCY MEDICINE

## 2018-02-27 PROCEDURE — 86901 BLOOD TYPING SEROLOGIC RH(D): CPT | Performed by: EMERGENCY MEDICINE

## 2018-02-27 PROCEDURE — 85610 PROTHROMBIN TIME: CPT | Performed by: EMERGENCY MEDICINE

## 2018-02-27 PROCEDURE — 93010 ELECTROCARDIOGRAM REPORT: CPT | Performed by: INTERNAL MEDICINE

## 2018-02-27 PROCEDURE — 85730 THROMBOPLASTIN TIME PARTIAL: CPT | Performed by: EMERGENCY MEDICINE

## 2018-02-27 PROCEDURE — 93005 ELECTROCARDIOGRAM TRACING: CPT

## 2018-02-27 PROCEDURE — 80061 LIPID PANEL: CPT

## 2018-02-27 NOTE — ED ATTENDING ATTESTATION
Hailee Carrillo DO, saw and evaluated the patient  I have discussed the patient with the resident/non-physician practitioner and agree with the resident's/non-physician practitioner's findings, Plan of Care, and MDM as documented in the resident's/non-physician practitioner's note, except where noted  All available labs and Radiology studies were reviewed  At this point I agree with the current assessment done in the Emergency Department  I have conducted an independent evaluation of this patient a history and physical is as follows:    Patient returns to the emergency department for complaint of worsening dyspnea on exertion, weakness and chest pain after mechanical fall 8 days ago  She underwent evaluation for traumatic injury at that time without acute findings  She is a dialysis patient with chronic kidney disease and anemia of chronic disease with a baseline hemoglobin near 8  Despite this she states that she feels like she needs a blood transfusion  No recent travel or sick contacts  ROS: Denies f/c, HA, abdominal pain, n/v/d  12 system ROS o/w negative  PE: NAD, appears comfortable, alert; PERRL, EOMI; MMM, no posterior oropharyngeal exudate, edema or erythema; HRR, no murmur, monitor shows NSR at 78 bpm; lungs CTA w/o w/r/r, POx 94% on RA (nl); abdomen s/nt/nd, nl BS in all 4 quadrant; (-) LE edema or calf TTP, FROM extremities x4, normal patellar DTRs; skin p/w/d; CNs GI/NF, oriented  DDx:  BARRETO/weakness - fatigue/malaise, worsening anemia, abnormal electrolytes, doubt infectious etiology or ACS/MI  A/P: Will check metabolic/cardiac workup, treat symptoms, reevaluate for further work up and disposition      Critical Care Time  CritCare Time    Procedures

## 2018-02-27 NOTE — ED PROVIDER NOTES
History  Chief Complaint   Patient presents with    Chest Pain     Pt had dialysis this morning and now feels weak and has chest pain  Pt states she thinks she needs a blood transfusion      HPI  56-year-old female presents to the emergency department with complaints of shortness of breath and generalized weakness  Patient states that she has been increasingly short of breath and weak ever since she fell onto her chest 8 days ago  She was seen in our emergency department the day after the fall and underwent CT chest/abdomen/pelvis that was negative for acute fracture or free fluid  She was noted to have a hemoglobin of 7 5 (with baseline 8-9) without a source of bleeding (hemoccult negative)  She has been taking Tylenol and using heating packs on her chest, but she has continued to have chest wall pain, dyspnea, especially on exertion, and generalized weakness  She is concerned that symptoms may be related to anemia and believes she may need a blood transfusion  She recalls having been similarly weak in the past prior to receiving transfusions  On review of hospital records (at patient's bedside), it appears she did receive a blood transfusion in October 2017 when her hemoglobin was 6 3 and in January 2016 when her hemoglobin was 6 8, but recently her baseline has remained fairly steady in the 8 to 9 range  She has had a chronic normocytic anemia consistent with CKD anemia and she was previously on Epo, but, per [facility] records, it was stopped due to her history of DVT  On ROS, she denies any recent fevers, chills, nausea, vomiting, abdominal pain, change in urinary/bowel function, lower extremity edema, or complaints other than stated above  She reports compliance with medications and dialysis and has not had any recent changes in dialysis input/output  Prior to Admission Medications   Prescriptions Last Dose Informant Patient Reported? Taking?    ALPRAZolam (XANAX) 0 25 mg tablet   Yes Yes   Sig: Take 0 125 mg by mouth every 4 (four) hours as needed for anxiety  B Complex-C-Folic Acid (TRIPHROCAPS PO)   Yes Yes   Sig: Take 1 capsule by mouth daily   CALCIUM CARBONATE PO   Yes Yes   Sig: Take 1,000 mg by mouth daily  Ferric Citrate (AURYXIA) 1  MG(Fe) TABS   Yes Yes   Sig: Take by mouth   Melatonin 3 MG CAPS   Yes Yes   Sig: Take by mouth   PARoxetine (PAXIL) 10 mg tablet   Yes Yes   Sig: Take 20 mg by mouth every morning     acetaZOLAMIDE (DIAMOX) 250 mg tablet   Yes Yes   Sig: Take 1 tablet by mouth 2 (two) times a day   albuterol (5 mg/mL) 0 5 % nebulizer solution   Yes Yes   Sig: Inhale   amLODIPine (NORVASC) 10 mg tablet   Yes Yes   Sig: Take 10 mg by mouth daily  brimonidine (ALPHAGAN P) 0 15 % ophthalmic solution   Yes Yes   Sig: Administer 1 drop to both eyes 3 (three) times a day     carvedilol (COREG) 25 mg tablet   Yes Yes   Sig: Take 25 mg by mouth 2 (two) times a day with meals  chlorhexidine (HIBICLENS) 4 % external liquid   No Yes   Sig: Apply 1 application topically daily as needed for wound care   cloNIDine (CATAPRES) 0 1 mg tablet   Yes Yes   Sig: Take 0 1 mg by mouth 2 (two) times a day   diphenhydrAMINE (BENADRYL) 25 mg capsule   Yes Yes   Sig: Take by mouth   gabapentin (NEURONTIN) 100 mg capsule   Yes Yes   Sig: Take 100 mg by mouth 3 (three) times a day     insulin aspart (NovoLOG) 100 units/mL injection   Yes Yes   Sig: Inject 1-5 Units under the skin 3 (three) times a day before meals As per sliding scale    insulin glargine (LANTUS) 100 units/mL subcutaneous injection   No Yes   Sig: Inject 24 Units under the skin daily at bedtime   lanthanum (FOSRENOL) 500 mg chewable tablet   Yes Yes   Sig: Chew   latanoprost (XALATAN) 0 005 % ophthalmic solution   Yes Yes   Sig: Administer 1 drop to both eyes daily at bedtime   levothyroxine 50 mcg tablet   Yes Yes   Sig: Take 50 mcg by mouth daily  losartan (COZAAR) 100 MG tablet   Yes Yes   Sig: Take 100 mg by mouth daily     magnesium oxide (MAG-OX) 400 mg   Yes Yes   Sig: Take 1 tablet by mouth 2 (two) times a day   methazolamide (NEPTAZANE) 25 MG tablet   Yes Yes   Sig: Take 25 mg by mouth daily     neomycin-bacitracin-polymyxin b (NEOSPORIN) ointment   No Yes   Sig: Apply topically 2 (two) times a day   pantoprazole (PROTONIX) 40 mg tablet   Yes Yes   Sig: Take 40 mg by mouth 2 (two) times a day  prednisoLONE acetate (PRED FORTE) 1 % ophthalmic suspension   Yes Yes   Sig: Administer 1 drop to both eyes 4 (four) times a day     torsemide (DEMADEX) 20 mg tablet   Yes Yes   Sig: Take by mouth   warfarin (COUMADIN) 5 mg tablet   Yes Yes   Sig: Take 1 tablet by mouth daily      Facility-Administered Medications: None       Past Medical History:   Diagnosis Date    Abnormal uterine bleeding (AUB)     Diabetes mellitus ([address]     Disease of thyroid gland     DVT (deep venous thrombosis) (HCC)     End stage kidney disease (HCC)     Foot ulcer due to secondary DM ([address] )     Hypertension     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[address] )     right eye    Morbid obesity ([address] )     Reflux esophagitis     Thrombophlebitis of saphenous vein     Warfarin anticoagulation    She does have history of DVT and shabbir     Past Surgical History:   Procedure Laterality Date    ARTERIOVENOUS GRAFT PLACEMENT      CERVICAL BIOPSY  W/ LOOP ELECTRODE EXCISION       SECTION      DIALYSIS FISTULA CREATION      DILATION AND CURETTAGE OF UTERUS      ENDOMETRIAL ABLATION W/ NOVASURE      PERICARDIUM SURGERY      NV LAPAROSCOPY W TOT HYSTERECT UTERUS 250 GRAM OR LESS N/A 2016    Procedure: HYSTERECTOMY LAPAROSCOPIC TOTAL ([address]), with bilateral salpingectomy;  Surgeon: Chava Medellin DO;  Location: [facility];  Service: Gynecology    THROMBECTOMY / ARTERIOVENOUS GRAFT REVISION      TOE SURGERY Right     removal of the 4th toe       History reviewed  No pertinent family history  I have reviewed and agree with the history as documented      Social History Substance Use Topics    Smoking status: Never Smoker    Smokeless tobacco: Never Used    Alcohol use No        Review of Systems   Constitutional: Positive for fatigue  Negative for chills and fever  HENT: Negative for trouble swallowing  Eyes: Negative for visual disturbance  Respiratory: Negative for cough and shortness of breath  Cardiovascular: Positive for chest pain  Gastrointestinal: Negative for abdominal pain, nausea and vomiting  Genitourinary: Negative for dysuria and hematuria  Musculoskeletal: Negative for back pain  Skin: Negative for rash  Allergic/Immunologic: Negative for immunocompromised state  Neurological: Negative for headaches  Hematological: Does not bruise/bleed easily  Psychiatric/Behavioral: The patient is not nervous/anxious  All other systems reviewed and are negative  Physical Exam  ED Triage Vitals   Temperature Pulse Respirations Blood Pressure SpO2   02/27/18 1149 02/27/18 1149 02/27/18 1149 02/27/18 1149 02/27/18 1149   97 6 °F (36 4 °C) 84 18 163/84 95 %      Temp Source Heart Rate Source Patient Position - Orthostatic VS BP Location FiO2 (%)   02/27/18 1149 02/27/18 1525 02/27/18 1525 02/27/18 1149 --   Oral Monitor Lying Right arm       Pain Score       02/27/18 1149       7           Orthostatic Vital Signs  Vitals:    02/27/18 1149 02/27/18 1525   BP: 163/84 159/68   Pulse: 84 78   Patient Position - Orthostatic VS:  Lying       Physical Exam   Constitutional: She is oriented to person, place, and time  She appears well-nourished  No distress  Well-appearing and comfortable without respiratory distress or dyspnea   HENT:   Head: Normocephalic and atraumatic  Eyes: EOM are normal    Neck: Normal range of motion  Neck supple  Cardiovascular: Normal rate and regular rhythm  Pulmonary/Chest: Effort normal and breath sounds normal  No respiratory distress  Abdominal: Soft  She exhibits no distension  There is no tenderness     obese Musculoskeletal: Normal range of motion  Neurological: She is alert and oriented to person, place, and time  Skin: Skin is warm and dry  She is not diaphoretic  Psychiatric: She has a normal mood and affect  Her behavior is normal    Nursing note and vitals reviewed  ED Medications  Medications - No data to display    Diagnostic Studies  Results Reviewed     Procedure Component Value Units Date/Time    Comprehensive metabolic panel [[id number]]  (Abnormal) Collected:  02/27/18 1340    Lab Status:  Final result Specimen:  Blood from Arm, Right Updated:  02/27/18 1436     Sodium 138 mmol/L      Potassium 3 6 mmol/L      Chloride 98 (L) mmol/L      CO2 33 (H) mmol/L      Anion Gap 7 mmol/L      BUN 28 (H) mg/dL      Creatinine 5 10 (H) mg/dL      Glucose 176 (H) mg/dL      Calcium 8 2 (L) mg/dL      AST 9 U/L      ALT <6 (L) U/L      Alkaline Phosphatase 109 U/L      Total Protein 7 5 g/dL      Albumin 3 2 (L) g/dL      Total Bilirubin 0 52 mg/dL      eGFR 9 ml/min/1 73sq m     Narrative:         National Kidney Disease Education Program recommendations are as follows:  GFR calculation is accurate only with a steady state creatinine  Chronic Kidney disease less than 60 ml/min/1 73 sq  meters  Kidney failure less than 15 ml/min/1 73 sq  meters  TSH, 3rd generation with T4 reflex [[id number]]  (Normal) Collected:  02/27/18 1340    Lab Status:  Final result Specimen:  Blood from Arm, Right Updated:  02/27/18 1436     TSH 3RD GENERATON 2 050 uIU/mL     Narrative:         Patients undergoing fluorescein dye angiography may retain small amounts of fluorescein in the body for 48-72 hours post procedure  Samples containing fluorescein can produce falsely depressed TSH values  If the patient had this procedure,a specimen should be resubmitted post fluorescein clearance            The recommended reference ranges for TSH during pregnancy are as follows:  First trimester 0 1 to 2 5 uIU/mL  Second trimester  0 2 to 3 0 uIU/mL  Third trimester 0 3 to 3 0 uIU/m      APTT [[id number]]  (Normal) Collected:  02/27/18 1340    Lab Status:  Final result Specimen:  Blood from Arm, Right Updated:  02/27/18 1416     PTT 35 seconds     Narrative: Therapeutic Heparin Range = 60-90 seconds    Protime-INR [[id number]]  (Abnormal) Collected:  02/27/18 1340    Lab Status:  Final result Specimen:  Blood from Arm, Right Updated:  02/27/18 1416     Protime 18 7 (H) seconds      INR 1 55 (H)    CBC and differential [[id number]]  (Abnormal) Collected:  02/27/18 1340    Lab Status:  Final result Specimen:  Blood from Arm, Right Updated:  02/27/18 1402     WBC 6 46 Thousand/uL      RBC 2 41 (L) Million/uL      Hemoglobin 7 6 (L) g/dL      Hematocrit 23 0 (L) %      MCV 95 fL      MCH 31 5 pg      MCHC 33 0 g/dL      RDW 13 5 %      MPV 10 3 fL      Platelets 706 Thousands/uL      nRBC 0 /100 WBCs      Neutrophils Relative 78 (H) %      Lymphocytes Relative 11 (L) %      Monocytes Relative 7 %      Eosinophils Relative 3 %      Basophils Relative 1 %      Neutrophils Absolute 5 06 Thousands/µL      Lymphocytes Absolute 0 72 Thousands/µL      Monocytes Absolute 0 42 Thousand/µL      Eosinophils Absolute 0 21 Thousand/µL      Basophils Absolute 0 03 Thousands/µL                  XR chest 2 views   ED Interpretation by Jordan Velázquez MD (02/27 1422)   Small right-sided effusion, otherwise no active pulmonary disease  Final Result by Nicki Barnes MD (02/27 1539)      Mild pulmonary vascular congestion  Workstation performed: [id number]               Procedures  Procedures  EKG: NSR @ 79 BPM     Phone Consults  ED Phone Contact    ED Course  ED Course as of Mar 01 1501   Tue Feb 27, 2018   1419 Hemoglobin: (!) 7 6   1519 Patient had a long discussion regarding she is for symptoms and her lab/x-ray findings  She plans to follow up with PCP for any ongoing symptoms and requests information on finding a new PCP within [facility]  She called her dialysis nurse while I was in the room and plans to arrange for outpatient blood transfusion (no indication for emergent transfusion at this time)  MDM  Number of Diagnoses or Management Options  Dyspnea: Fatigue:   Diagnosis management comments: 65-year-old female with dyspnea, fatigue since fall onto chest 8 days ago  Underwent negative trauma workup at the time without obvious injury  Will obtain SOB workup, CXR  Dispo pending  Amount and/or Complexity of Data Reviewed  Clinical lab tests: reviewed  Tests in the radiology section of CPT®: reviewed  Decide to obtain previous medical records or to obtain history from someone other than the patient: yes      CritCare Time    Disposition  Final diagnoses:   Dyspnea   Fatigue     Time reflects when diagnosis was documented in both MDM as applicable and the Disposition within this note     Time User Action Codes Description Comment    2/27/2018  3:24 PM Sandy Olsona Add [R53 1] Generalized weakness     2/27/2018  3:24 PM ZwSandy mullinsa Add [R06 00] Dyspnea     2/27/2018  3:24 PM Sandy Olsona Modify [R06 00] Dyspnea     2/27/2018  3:24 PM Griselda Olson Remove [R53 1] Generalized weakness     2/27/2018  3:24 PM Sandy Olsona Add [R53 83] Fatigue     2/27/2018  3:24 PM Griselda Olson Modify [R06 00] Dyspnea     2/27/2018  3:24 PM Griselda Valero Modify [R53 83] Fatigue       ED Disposition     ED Disposition Condition Comment    Discharge  Arlyce Current discharge to home/self care      Condition at discharge: Good        Follow-up Information     Follow up With Specialties Details Why Contact Info    Hanna Davis MD Internal Medicine   [address] [place] [address]  [phone]      [employer]   To set up new [phone]          Discharge Medication List as of 2/27/2018  3:28 PM      CONTINUE these medications which have NOT CHANGED    Details   acetaZOLAMIDE (DIAMOX) 250 mg tablet Take 1 tablet by mouth 2 (two) times a day, Starting Wed 12/13/2017, Historical Med      albuterol (5 mg/mL) 0 5 % nebulizer solution Inhale, Historical Med      ALPRAZolam (XANAX) 0 25 mg tablet Take 0 125 mg by mouth every 4 (four) hours as needed for anxiety  , Until Discontinued, Historical Med      amLODIPine (NORVASC) 10 mg tablet Take 10 mg by mouth daily  , Until Discontinued, Historical Med      B Complex-C-Folic Acid (TRIPHROCAPS PO) Take 1 capsule by mouth daily, Starting Wed 1/13/2016, Historical Med      brimonidine (ALPHAGAN P) 0 15 % ophthalmic solution Administer 1 drop to both eyes 3 (three) times a day  , Until Discontinued, Historical Med      CALCIUM CARBONATE PO Take 1,000 mg by mouth daily  , Until Discontinued, Historical Med      carvedilol (COREG) 25 mg tablet Take 25 mg by mouth 2 (two) times a day with meals  , Until Discontinued, Historical Med      chlorhexidine (HIBICLENS) 4 % external liquid Apply 1 application topically daily as needed for wound care, Starting Sat 9/9/2017, Print      cloNIDine (CATAPRES) 0 1 mg tablet Take 0 1 mg by mouth 2 (two) times a day, Until Discontinued, Historical Med      diphenhydrAMINE (BENADRYL) 25 mg capsule Take by mouth, Historical Med      Ferric Citrate (AURYXIA) 1  MG(Fe) TABS Take by mouth, Starting Wed 3/29/2017, Historical Med      gabapentin (NEURONTIN) 100 mg capsule Take 100 mg by mouth 3 (three) times a day  , Until Discontinued, Historical Med      insulin aspart (NovoLOG) 100 units/mL injection Inject 1-5 Units under the skin 3 (three) times a day before meals As per sliding scale , Until Discontinued, Historical Med      insulin glargine (LANTUS) 100 units/mL subcutaneous injection Inject 24 Units under the skin daily at bedtime, Starting 2/19/2017, Until Discontinued, No Print      lanthanum (FOSRENOL) 500 mg chewable tablet Chew, Historical Med      latanoprost (XALATAN) 0 005 % ophthalmic solution Administer 1 drop to both eyes daily at bedtime, Until Discontinued, Historical Med      levothyroxine 50 mcg tablet Take 50 mcg by mouth daily  , Until Discontinued, Historical Med      losartan (COZAAR) 100 MG tablet Take 100 mg by mouth daily  , Until Discontinued, Historical Med      magnesium oxide (MAG-OX) 400 mg Take 1 tablet by mouth 2 (two) times a day, Starting Tue 8/26/2014, Historical Med      Melatonin 3 MG CAPS Take by mouth, Historical Med      methazolamide (NEPTAZANE) 25 MG tablet Take 25 mg by mouth daily  , Until Discontinued, Historical Med      neomycin-bacitracin-polymyxin b (NEOSPORIN) ointment Apply topically 2 (two) times a day, Starting Tue 1/30/2018, Print      pantoprazole (PROTONIX) 40 mg tablet Take 40 mg by mouth 2 (two) times a day , Until Discontinued, Historical Med      PARoxetine (PAXIL) 10 mg tablet Take 20 mg by mouth every morning  , Until Discontinued, Historical Med      prednisoLONE acetate (PRED FORTE) 1 % ophthalmic suspension Administer 1 drop to both eyes 4 (four) times a day  , Until Discontinued, Historical Med      torsemide (DEMADEX) 20 mg tablet Take by mouth, Historical Med      warfarin (COUMADIN) 5 mg tablet Take 1 tablet by mouth daily, Starting Fri 10/30/2015, Historical Med           No discharge procedures on file  ED Provider  Attending physically available and evaluated Yossi Reynaga I managed the patient along with the ED Attending      Electronically Signed by         Aashish Cervantes MD  03/01/18 [id number]

## 2018-02-27 NOTE — DISCHARGE INSTRUCTIONS
Dyspnea   WHAT YOU NEED TO KNOW:   Dyspnea is breathing difficulty or discomfort  You may have labored, painful, or shallow breathing  You may feel breathless or short of breath  Dyspnea can occur during rest or with activity  You may have dyspnea for a short time, or it might become chronic  Dyspnea is often a symptom of a disease or condition  DISCHARGE INSTRUCTIONS:   Return to the emergency department if:   · Your signs and symptoms are the same or worse within 24 hours of treatment  · You have shaking chills or a fever over 102°F      · You have new pain, pressure, or tightness in your chest      · You have a new or worse cough or wheezing, or you cough up blood  · You feel like you cannot get enough air  · The skin over your ribs or on your neck sinks in when you breathe  · You have a severe headache with vomiting and abdominal pain  · You feel confused or dizzy  Contact your healthcare provider or specialist if:   · You have questions or concerns about your condition or care  Medicines:   · Medicines  may be used to treat the cause of your dyspnea  Medicines may reduce swelling in your airway or decrease extra fluid from around your heart or lungs  Other medicines may be used to decrease anxiety and help you feel calm and relaxed  · Take your medicine as directed  Contact your healthcare provider if you think your medicine is not helping or if you have side effects  Tell him or her if you are allergic to any medicine  Keep a list of the medicines, vitamins, and herbs you take  Include the amounts, and when and why you take them  Bring the list or the pill bottles to follow-up visits  Carry your medicine list with you in case of an emergency  Manage long-term dyspnea:   · Create an action plan  You and your healthcare provider can work together to create a plan for how to handle episodes of dyspnea   The plan can include daily activities, treatment changes, and what to do if you have severe breathing problems  · Lean forward on your elbows when you sit  This helps your lungs expand and may make it easier to breathe  · Use pursed-lip breathing any time you feel short of breath  Breathe in through your nose and then slowly breathe out through your mouth with your lips slightly puckered  It should take you twice as long to breathe out as it did to breathe in  · Do not smoke  Nicotine and other chemicals in cigarettes and cigars can cause lung damage and make it harder to breathe  Ask your healthcare provider for information if you currently smoke and need help to quit  E-cigarettes or smokeless tobacco still contain nicotine  Talk to your healthcare provider before you use these products  · Reach or maintain a healthy weight  Your healthcare provider can help you create a safe weight loss plan if you are overweight  · Exercise as directed  Exercise can help your lungs work more easily  Exercise can also help you lose weight if needed  Try to get at least 30 minutes of exercise most days of the week  Your healthcare provider can help you create an exercise plan that is safe for you  Follow up with your healthcare provider or specialist as directed:  Write down your questions so you remember to ask them during your visits  © 2017 [address]  Information is for End User's use only and may not be sold, redistributed or otherwise used for commercial purposes  All illustrations and images included in CareNotes® are the copyrighted property of [employer]  or [employer]  The above information is an  only  It is not intended as medical advice for individual conditions or treatments  Talk to your doctor, nurse or pharmacist before following any medical regimen to see if it is safe and effective for you      Fatigue   WHAT YOU NEED TO KNOW:   Fatigue is mental and physical exhaustion that does not get better with rest  Fatigue may make daily activities difficult or cause extreme sleepiness  It is normal to feel tired sometimes, but long-term fatigue may be a sign of serious illness  DISCHARGE INSTRUCTIONS:   Return to the emergency department if:   · You have chest pain  · You have difficulty breathing  Contact your healthcare provider if:   · You have a cough that gets worse, or does not go away  · You see blood in your urine or bowel movement  · You have numbness or tingling around your mouth or in an arm or leg  · You faint, feel dizzy, or have vision changes  · You have swelling in your lymph nodes  · You are a woman and have vaginal bleeding that is not normal for you, or is not expected  · You lose weight without trying, or you have trouble eating  · You feel weak or have muscle pain  · You have pain or swelling in your joints  · You have questions or concerns about your condition or care  Follow up with your healthcare provider as directed: You may need more tests  Your healthcare provider may also refer you to a specialist  Write down your questions so you remember to ask them during your visits  Manage fatigue:   · Keep a fatigue diary  Include anything that makes you feel more tired or less tired  Bring the diary with you to follow-up visits with your provider  · Exercise as directed  Exercise can help you feel more alert  Exercise can also help you manage stress or relieve depression  Try to get at least 30 minutes of exercise most days of the week  · Keep a regular sleep schedule  Go to bed and wake up at the same times every day  Limit naps to 1 hour each day  A nap can improve fatigue, but a long nap may make it harder to go to sleep at night  · Plan and limit your activities  Limit the number of activities such as shopping and cleaning you do each day  If possible, try to spread out your trips throughout the week   Plan ahead so you are not rushing to get something done  Only do activities that you have the energy to complete  Take breaks between activities  Ask for help if you need it  Another person may be able to drive you or help with daily activities  · Eat a variety of healthy foods  Healthy foods include fruits, vegetables, whole-grain breads, low-fat dairy products, beans, lean meats, and fish  Good nutrition can help manage fatigue  · Limit caffeine and alcohol  These can make it difficult to fall or stay asleep  Women should limit alcohol to 1 drink a day  Men should limit alcohol to 2 drinks a day  A drink of alcohol is 12 ounces of beer, 5 ounces of wine, or 1½ ounces of liquor  Ask our healthcare provider how much caffeine is safe for you  · Do not smoke  Nicotine and other chemicals in cigarettes and cigars can cause lung damage and increase fatigue  Ask your healthcare provider for information if you currently smoke and need help to quit  E-cigarettes or smokeless tobacco still contain nicotine  Talk to your healthcare provider before you use these products  © 2017 [facility] [facility] Information is for End User's use only and may not be sold, redistributed or otherwise used for commercial purposes  All illustrations and images included in CareNotes® are the copyrighted property of [employer]  or [employer] Weinstein  The above information is an  only  It is not intended as medical advice for individual conditions or treatments  Talk to your doctor, nurse or pharmacist before following any medical regimen to see if it is safe and effective for you

## 2018-02-27 NOTE — ED NOTES
Pt was told by PCP that she may need blood transfusion due to H&H steadily dropping        Reshma Brandt RN  02/27/18 3541

## 2018-03-05 ENCOUNTER — HOSPITAL ENCOUNTER (OUTPATIENT)
Dept: INFUSION CENTER | Facility: HOSPITAL | Age: 53
Discharge: HOME | End: 2018-03-05
Payer: MEDICARE

## 2018-03-05 VITALS
SYSTOLIC BLOOD PRESSURE: 153 MMHG | TEMPERATURE: 97.7 F | DIASTOLIC BLOOD PRESSURE: 68 MMHG | HEART RATE: 58 BPM | RESPIRATION RATE: 18 BRPM

## 2018-03-05 LAB
ABO GROUP BLD: NORMAL
BLD GP AB SCN SERPL QL: NEGATIVE
RH BLD: POSITIVE
SPECIMEN EXPIRATION DATE: NORMAL

## 2018-03-05 PROCEDURE — P9040 RBC LEUKOREDUCED IRRADIATED: HCPCS

## 2018-03-05 PROCEDURE — 86850 RBC ANTIBODY SCREEN: CPT | Performed by: INTERNAL MEDICINE

## 2018-03-05 PROCEDURE — 86923 COMPATIBILITY TEST ELECTRIC: CPT

## 2018-03-05 PROCEDURE — 86901 BLOOD TYPING SEROLOGIC RH(D): CPT | Performed by: INTERNAL MEDICINE

## 2018-03-05 PROCEDURE — 86900 BLOOD TYPING SEROLOGIC ABO: CPT | Performed by: INTERNAL MEDICINE

## 2018-03-05 PROCEDURE — 36430 TRANSFUSION BLD/BLD COMPNT: CPT

## 2018-03-05 NOTE — PLAN OF CARE
Problem: Potential for Falls  Goal: Patient will remain free of falls  INTERVENTIONS:  - Assess patient frequently for physical needs  -  Identify cognitive and physical deficits and behaviors that affect risk of falls    -  [place] fall precautions as indicated by assessment   - Educate patient/family on patient safety including physical limitations  - Instruct patient to call for assistance with activity based on assessment  - Modify environment to reduce risk of injury  - Consider OT/PT consult to assist with strengthening/mobility   Outcome: Progressing

## 2018-03-06 LAB
ABO GROUP BLD BPU: NORMAL
BPU ID: NORMAL
UNIT DISPENSE STATUS: NORMAL
UNIT PRODUCT CODE: NORMAL
UNIT RH: NORMAL

## 2018-03-21 ENCOUNTER — TELEPHONE (OUTPATIENT)
Dept: GASTROENTEROLOGY | Facility: CLINIC | Age: 53
End: 2018-03-21

## 2018-03-29 ENCOUNTER — TELEPHONE (OUTPATIENT)
Dept: NEPHROLOGY | Facility: CLINIC | Age: 53
End: 2018-03-29

## 2018-03-29 NOTE — TELEPHONE ENCOUNTER
Called the patient is [facility] was not able to reach the patient  Patient has a new phone number now which is now in the chart  I gave the new number to [facility] team   Patient is still interested in being evaluated  Patient missed her last appointment with [facility] due to falling and hurting her ribs

## 2018-04-24 ENCOUNTER — TELEPHONE (OUTPATIENT)
Dept: OTHER | Facility: HOSPITAL | Age: 53
End: 2018-04-24

## 2018-04-24 NOTE — TELEPHONE ENCOUNTER
Called patient as [facility] is having diff reaching patient   No answer at cell number    The home number is incorrect

## 2018-05-22 ENCOUNTER — APPOINTMENT (EMERGENCY)
Dept: RADIOLOGY | Facility: HOSPITAL | Age: 53
End: 2018-05-22
Payer: MEDICARE

## 2018-05-22 ENCOUNTER — HOSPITAL ENCOUNTER (OUTPATIENT)
Facility: HOSPITAL | Age: 53
Setting detail: OBSERVATION
Discharge: HOME | End: 2018-05-23
Attending: EMERGENCY MEDICINE | Admitting: INTERNAL MEDICINE
Payer: MEDICARE

## 2018-05-22 DIAGNOSIS — N18.6 ESRD (END STAGE RENAL DISEASE) (HCC): ICD-10-CM

## 2018-05-22 DIAGNOSIS — R51.9 HEADACHE: Primary | ICD-10-CM

## 2018-05-22 DIAGNOSIS — I16.0 HYPERTENSIVE URGENCY: ICD-10-CM

## 2018-05-22 DIAGNOSIS — R51.9 BILATERAL HEADACHE: ICD-10-CM

## 2018-05-22 LAB
ALBUMIN SERPL BCP-MCNC: 3.4 G/DL (ref 3.5–5)
ALP SERPL-CCNC: 116 U/L (ref 46–116)
ALT SERPL W P-5'-P-CCNC: 17 U/L (ref 12–78)
ANION GAP SERPL CALCULATED.3IONS-SCNC: 4 MMOL/L (ref 4–13)
APTT PPP: 39 SECONDS (ref 24–36)
AST SERPL W P-5'-P-CCNC: 22 U/L (ref 5–45)
BASOPHILS # BLD AUTO: 0.03 THOUSANDS/ΜL (ref 0–0.1)
BASOPHILS NFR BLD AUTO: 1 % (ref 0–1)
BILIRUB SERPL-MCNC: 0.41 MG/DL (ref 0.2–1)
BUN SERPL-MCNC: 36 MG/DL (ref 5–25)
CALCIUM SERPL-MCNC: 8.2 MG/DL (ref 8.3–10.1)
CHLORIDE SERPL-SCNC: 101 MMOL/L (ref 100–108)
CO2 SERPL-SCNC: 31 MMOL/L (ref 21–32)
CREAT SERPL-MCNC: 5.3 MG/DL (ref 0.6–1.3)
EOSINOPHIL # BLD AUTO: 0.19 THOUSAND/ΜL (ref 0–0.61)
EOSINOPHIL NFR BLD AUTO: 3 % (ref 0–6)
ERYTHROCYTE [DISTWIDTH] IN BLOOD BY AUTOMATED COUNT: 13.8 % (ref 11.6–15.1)
GFR SERPL CREATININE-BSD FRML MDRD: 9 ML/MIN/1.73SQ M
GLUCOSE SERPL-MCNC: 114 MG/DL (ref 65–140)
HCT VFR BLD AUTO: 27.4 % (ref 34.8–46.1)
HGB BLD-MCNC: 8.9 G/DL (ref 11.5–15.4)
INR PPP: 2.42 (ref 0.86–1.17)
LYMPHOCYTES # BLD AUTO: 1.28 THOUSANDS/ΜL (ref 0.6–4.47)
LYMPHOCYTES NFR BLD AUTO: 20 % (ref 14–44)
MAGNESIUM SERPL-MCNC: 2.1 MG/DL (ref 1.6–2.6)
MCH RBC QN AUTO: 31 PG (ref 26.8–34.3)
MCHC RBC AUTO-ENTMCNC: 32.5 G/DL (ref 31.4–37.4)
MCV RBC AUTO: 96 FL (ref 82–98)
MONOCYTES # BLD AUTO: 0.38 THOUSAND/ΜL (ref 0.17–1.22)
MONOCYTES NFR BLD AUTO: 6 % (ref 4–12)
NEUTROPHILS # BLD AUTO: 4.43 THOUSANDS/ΜL (ref 1.85–7.62)
NEUTS SEG NFR BLD AUTO: 70 % (ref 43–75)
NRBC BLD AUTO-RTO: 0 /100 WBCS
PHOSPHATE SERPL-MCNC: 4.8 MG/DL (ref 2.7–4.5)
PLATELET # BLD AUTO: 176 THOUSANDS/UL (ref 149–390)
PMV BLD AUTO: 10.8 FL (ref 8.9–12.7)
POTASSIUM SERPL-SCNC: 4.7 MMOL/L (ref 3.5–5.3)
PROT SERPL-MCNC: 7.5 G/DL (ref 6.4–8.2)
PROTHROMBIN TIME: 26.4 SECONDS (ref 11.8–14.2)
RBC # BLD AUTO: 2.87 MILLION/UL (ref 3.81–5.12)
SODIUM SERPL-SCNC: 136 MMOL/L (ref 136–145)
TROPONIN I SERPL-MCNC: <0.02 NG/ML
WBC # BLD AUTO: 6.33 THOUSAND/UL (ref 4.31–10.16)

## 2018-05-22 PROCEDURE — 84100 ASSAY OF PHOSPHORUS: CPT | Performed by: EMERGENCY MEDICINE

## 2018-05-22 PROCEDURE — 96374 THER/PROPH/DIAG INJ IV PUSH: CPT

## 2018-05-22 PROCEDURE — 85610 PROTHROMBIN TIME: CPT | Performed by: EMERGENCY MEDICINE

## 2018-05-22 PROCEDURE — 80053 COMPREHEN METABOLIC PANEL: CPT | Performed by: EMERGENCY MEDICINE

## 2018-05-22 PROCEDURE — 70450 CT HEAD/BRAIN W/O DYE: CPT

## 2018-05-22 PROCEDURE — 96361 HYDRATE IV INFUSION ADD-ON: CPT

## 2018-05-22 PROCEDURE — 85730 THROMBOPLASTIN TIME PARTIAL: CPT | Performed by: EMERGENCY MEDICINE

## 2018-05-22 PROCEDURE — 99219 PR INITIAL OBSERVATION CARE/DAY 50 MINUTES: CPT | Performed by: PHYSICIAN ASSISTANT

## 2018-05-22 PROCEDURE — 84484 ASSAY OF TROPONIN QUANT: CPT | Performed by: EMERGENCY MEDICINE

## 2018-05-22 PROCEDURE — 85025 COMPLETE CBC W/AUTO DIFF WBC: CPT | Performed by: EMERGENCY MEDICINE

## 2018-05-22 PROCEDURE — 83735 ASSAY OF MAGNESIUM: CPT | Performed by: EMERGENCY MEDICINE

## 2018-05-22 PROCEDURE — 96375 TX/PRO/DX INJ NEW DRUG ADDON: CPT

## 2018-05-22 PROCEDURE — 36415 COLL VENOUS BLD VENIPUNCTURE: CPT | Performed by: EMERGENCY MEDICINE

## 2018-05-22 RX ORDER — LABETALOL HYDROCHLORIDE 5 MG/ML
10 INJECTION, SOLUTION INTRAVENOUS ONCE
Status: COMPLETED | OUTPATIENT
Start: 2018-05-22 | End: 2018-05-22

## 2018-05-22 RX ORDER — LEVOTHYROXINE SODIUM 0.05 MG/1
50 TABLET ORAL
Status: DISCONTINUED | OUTPATIENT
Start: 2018-05-23 | End: 2018-05-23 | Stop reason: HOSPADM

## 2018-05-22 RX ORDER — GABAPENTIN 100 MG/1
100 CAPSULE ORAL 3 TIMES DAILY
Status: DISCONTINUED | OUTPATIENT
Start: 2018-05-22 | End: 2018-05-23 | Stop reason: HOSPADM

## 2018-05-22 RX ORDER — LANOLIN ALCOHOL/MO/W.PET/CERES
10 CREAM (GRAM) TOPICAL
Status: DISCONTINUED | OUTPATIENT
Start: 2018-05-22 | End: 2018-05-23 | Stop reason: HOSPADM

## 2018-05-22 RX ORDER — CLONIDINE HYDROCHLORIDE 0.1 MG/1
0.1 TABLET ORAL 2 TIMES DAILY
Status: DISCONTINUED | OUTPATIENT
Start: 2018-05-23 | End: 2018-05-23 | Stop reason: HOSPADM

## 2018-05-22 RX ORDER — CALCIUM CARBONATE 500(1250)
500 TABLET ORAL 2 TIMES DAILY WITH MEALS
Status: DISCONTINUED | OUTPATIENT
Start: 2018-05-23 | End: 2018-05-23 | Stop reason: HOSPADM

## 2018-05-22 RX ORDER — SEVELAMER HYDROCHLORIDE 400 MG/1
800 TABLET, FILM COATED ORAL
Status: DISCONTINUED | OUTPATIENT
Start: 2018-05-23 | End: 2018-05-23 | Stop reason: HOSPADM

## 2018-05-22 RX ORDER — LATANOPROST 50 UG/ML
1 SOLUTION/ DROPS OPHTHALMIC
Status: DISCONTINUED | OUTPATIENT
Start: 2018-05-22 | End: 2018-05-23 | Stop reason: HOSPADM

## 2018-05-22 RX ORDER — TORSEMIDE 100 MG/1
100 TABLET ORAL DAILY
Status: DISCONTINUED | OUTPATIENT
Start: 2018-05-23 | End: 2018-05-23

## 2018-05-22 RX ORDER — ATORVASTATIN CALCIUM 20 MG/1
20 TABLET, FILM COATED ORAL
Status: DISCONTINUED | OUTPATIENT
Start: 2018-05-23 | End: 2018-05-23 | Stop reason: HOSPADM

## 2018-05-22 RX ORDER — TRAMADOL HYDROCHLORIDE 50 MG/1
50 TABLET ORAL EVERY 6 HOURS PRN
Status: DISCONTINUED | OUTPATIENT
Start: 2018-05-22 | End: 2018-05-23

## 2018-05-22 RX ORDER — OMEPRAZOLE 40 MG/1
40 CAPSULE, DELAYED RELEASE ORAL DAILY
COMMUNITY
End: 2019-01-22

## 2018-05-22 RX ORDER — DORZOLAMIDE HYDROCHLORIDE AND TIMOLOL MALEATE 20; 5 MG/ML; MG/ML
1 SOLUTION/ DROPS OPHTHALMIC 2 TIMES DAILY
Status: DISCONTINUED | OUTPATIENT
Start: 2018-05-22 | End: 2018-05-23 | Stop reason: HOSPADM

## 2018-05-22 RX ORDER — PAROXETINE HYDROCHLORIDE 20 MG/1
20 TABLET, FILM COATED ORAL EVERY MORNING
Status: DISCONTINUED | OUTPATIENT
Start: 2018-05-23 | End: 2018-05-23 | Stop reason: HOSPADM

## 2018-05-22 RX ORDER — CARVEDILOL 25 MG/1
25 TABLET ORAL DAILY
Status: DISCONTINUED | OUTPATIENT
Start: 2018-05-23 | End: 2018-05-23

## 2018-05-22 RX ORDER — OXYCODONE HYDROCHLORIDE AND ACETAMINOPHEN 5; 325 MG/1; MG/1
1 TABLET ORAL EVERY 4 HOURS PRN
Status: DISCONTINUED | OUTPATIENT
Start: 2018-05-22 | End: 2018-05-22

## 2018-05-22 RX ORDER — PANTOPRAZOLE SODIUM 40 MG/1
40 TABLET, DELAYED RELEASE ORAL 2 TIMES DAILY
Status: DISCONTINUED | OUTPATIENT
Start: 2018-05-23 | End: 2018-05-23 | Stop reason: HOSPADM

## 2018-05-22 RX ORDER — PREDNISOLONE ACETATE 10 MG/ML
1 SUSPENSION/ DROPS OPHTHALMIC 4 TIMES DAILY
Status: DISCONTINUED | OUTPATIENT
Start: 2018-05-22 | End: 2018-05-23 | Stop reason: HOSPADM

## 2018-05-22 RX ORDER — INSULIN GLARGINE 100 [IU]/ML
28 INJECTION, SOLUTION SUBCUTANEOUS
Status: DISCONTINUED | OUTPATIENT
Start: 2018-05-22 | End: 2018-05-23 | Stop reason: HOSPADM

## 2018-05-22 RX ORDER — METOCLOPRAMIDE HYDROCHLORIDE 5 MG/ML
10 INJECTION INTRAMUSCULAR; INTRAVENOUS ONCE
Status: COMPLETED | OUTPATIENT
Start: 2018-05-22 | End: 2018-05-22

## 2018-05-22 RX ORDER — DIPHENHYDRAMINE HCL 25 MG
25 TABLET ORAL EVERY 8 HOURS PRN
Status: DISCONTINUED | OUTPATIENT
Start: 2018-05-22 | End: 2018-05-23 | Stop reason: HOSPADM

## 2018-05-22 RX ORDER — ATORVASTATIN CALCIUM 20 MG/1
20 TABLET, FILM COATED ORAL EVERY EVENING
Refills: 0 | COMMUNITY
Start: 2018-04-24

## 2018-05-22 RX ORDER — BRIMONIDINE TARTRATE 0.15 %
1 DROPS OPHTHALMIC (EYE) 3 TIMES DAILY
Status: DISCONTINUED | OUTPATIENT
Start: 2018-05-22 | End: 2018-05-23 | Stop reason: HOSPADM

## 2018-05-22 RX ORDER — ALPRAZOLAM 0.25 MG/1
0.12 TABLET ORAL EVERY 4 HOURS PRN
Status: DISCONTINUED | OUTPATIENT
Start: 2018-05-22 | End: 2018-05-23 | Stop reason: HOSPADM

## 2018-05-22 RX ORDER — TORSEMIDE 20 MG/1
20 TABLET ORAL DAILY
Status: DISCONTINUED | OUTPATIENT
Start: 2018-05-23 | End: 2018-05-23

## 2018-05-22 RX ADMIN — DORZOLAMIDE HYDROCHLORIDE AND TIMOLOL MALEATE 1 DROP: 20; 5 SOLUTION/ DROPS OPHTHALMIC at 22:33

## 2018-05-22 RX ADMIN — HYDROMORPHONE HYDROCHLORIDE 0.5 MG: 1 INJECTION, SOLUTION INTRAMUSCULAR; INTRAVENOUS; SUBCUTANEOUS at 19:22

## 2018-05-22 RX ADMIN — INSULIN GLARGINE 28 UNITS: 100 INJECTION, SOLUTION SUBCUTANEOUS at 23:31

## 2018-05-22 RX ADMIN — LABETALOL 20 MG/4 ML (5 MG/ML) INTRAVENOUS SYRINGE 10 MG: at 19:50

## 2018-05-22 RX ADMIN — METOCLOPRAMIDE 10 MG: 5 INJECTION, SOLUTION INTRAMUSCULAR; INTRAVENOUS at 19:22

## 2018-05-22 RX ADMIN — TRAMADOL HYDROCHLORIDE 50 MG: 50 TABLET, FILM COATED ORAL at 22:38

## 2018-05-22 RX ADMIN — SODIUM CHLORIDE 250 ML: 9 INJECTION, SOLUTION INTRAVENOUS at 19:22

## 2018-05-22 NOTE — ED ATTENDING ATTESTATION
Ervin Rea MD, saw and evaluated the patient  I have discussed the patient with the resident/non-physician practitioner and agree with the resident's/non-physician practitioner's findings, Plan of Care, and MDM as documented in the resident's/non-physician practitioner's note, except where noted  All available labs and Radiology studies were reviewed  At this point I agree with the current assessment done in the Emergency Department  I have conducted an independent evaluation of this patient a history and physical is as follows:    OA: 49 y/o f with h/o DM, HTN and ESRD on dialysis who presents to the ED c/o headache since Friday  Pt seen at OSH for injections to R eye as she is legally blind in the left eye  Pain has been progressive since that time and acutely worsened after dialysis today  No n/v  No other change in vision  No focal numbness/weakness/tingling  No cp/sob  No n/v  No focal numbness/weakness/tingling  PE, well developed f, uncomfortable but in NAD, HTN otherwise VSS, NC/AT, MMM, legally blind R eye, L eye with reactive pupil, no nystagmus, nondilated fundoscopic exam grossly WNL, neck supple/FROM, RR, lungs CTAB, abd soft, NT/ND, +Bs, -r/g, no edema, intact pulses, + thrill in fistula over LUE   A/p headache with HTN, BP control, cardiac eval with labs, discuss with optho, admit    Critical Care Time  CritCare Time    Procedures

## 2018-05-22 NOTE — ED PROVIDER NOTES
History  Chief Complaint   Patient presents with    Headache     pt with headache, pt had her left eye injected at [facility], pt with pain also in her right eye with redness and feels like it is turning inward, pt with a nose bleed at dialysis also today      HPI  65-year-old female past history end-stage renal disease on dialysis, history of DVTs on Coumadin presents with complaints of diffuse headache that has been getting progressively worse since last Friday  Patient says she is legally blind in the right eye follows up with Ophthalmology [facility] he gets injections every several months to preserve functional left eye  Patient says she had an ejection last Friday and several hours later began with generalized headache that has been getting progressively worse  She describes it as a constant pressure is diffuse waxes and wanes intensity associated with photophobia otherwise has no exacerbating or alleviating factors  Patient says headache was not sudden onset was not maximal in onset denies any neck stiffness, weakness, numbness, tingling, speech difficulties  Patient says she touch base with her ophthalmologist today and they recommended she come in for evaluation  Patient denies any change in vision pain is not localized eyes diffuse  Patient says blood pressure is very elevated upon arrival systolic is 259  Patient says she is on Coreg and her default is apparently told her to stop taking that  Patient says she has been taking it once a day used to be b i d   Patient gets dialysis Tuesday Thursday Saturday [address] and says she received today  She still makes a small amount away urine  A 12 systems reviewed otherwise negative except as stated in HPI  Imp/plan: 49 yo female presetns w/ generalized HA since Friday  Began after getting injection to L eye  Eye has baseline visual acuity  No change in vision  There is no surrounding erythema/induration  Doubt infection   Concern for bleed, possibly hypertensive urgency  I will do cardiac w/u, treat HA, get ct head to r/o bleed, treat blood pressure, likely admit  Prior to Admission Medications   Prescriptions Last Dose Informant Patient Reported? Taking? ALPRAZolam (XANAX) 0 25 mg tablet   Yes Yes   Sig: Take 0 125 mg by mouth every 4 (four) hours as needed for anxiety Take on Tues, Friday and Saturday    B Complex-C-Folic Acid (TRIPHROCAPS PO)   Yes Yes   Sig: Take 1 capsule by mouth daily   CALCIUM CARBONATE PO   Yes Yes   Sig: Take 1,000 mg by mouth daily     Ferric Citrate (AURYXIA) 1  MG(Fe) TABS   Yes Yes   Sig: Take by mouth   Melatonin 3 MG CAPS   Yes Yes   Sig: Take 10 mg by mouth daily at bedtime     PARoxetine (PAXIL) 10 mg tablet   Yes Yes   Sig: Take 20 mg by mouth every morning Except Tues, Friday and Saturday    VIGAMOX 0 5 % ophthalmic solution   Yes No   Sig: Administer 1 drop to the right eye   atorvastatin (LIPITOR) 20 mg tablet   Yes Yes   atropine 1 % ophthalmic ointment   Yes Yes   Sig: Administer 1 application to the right eye 3 (three) times a day   brimonidine (ALPHAGAN P) 0 15 % ophthalmic solution   Yes Yes   Sig: Administer 1 drop to both eyes 3 (three) times a day     carvedilol (COREG) 25 mg tablet   Yes Yes   Sig: Take 25 mg by mouth 2 (two) times a day with meals     cholecalciferol (VITAMIN D3) 1,000 units tablet   Yes Yes   Sig: Take 2,000 Units by mouth daily   cinacalcet (SENSIPAR) 30 mg tablet   Yes Yes   Sig: Take 30 mg by mouth daily   cloNIDine (CATAPRES) 0 1 mg tablet   Yes Yes   Sig: Take 0 1 mg by mouth 2 (two) times a day   diphenhydrAMINE (BENADRYL) 25 mg capsule   Yes Yes   Sig: Take by mouth   gabapentin (NEURONTIN) 100 mg capsule   Yes Yes   Sig: Take 100 mg by mouth 3 (three) times a day     insulin aspart (NovoLOG) 100 units/mL injection   Yes Yes   Sig: Inject 1-5 Units under the skin 3 (three) times a day before meals As per sliding scale    insulin glargine (LANTUS) 100 units/mL subcutaneous injection   No Yes   Sig: Inject 24 Units under the skin daily at bedtime   Patient taking differently: Inject 28 Units under the skin daily at bedtime     latanoprost (XALATAN) 0 005 % ophthalmic solution   Yes Yes   Sig: Administer 1 drop to both eyes daily at bedtime   levothyroxine 50 mcg tablet   Yes Yes   Sig: Take 50 mcg by mouth daily  losartan (COZAAR) 100 MG tablet   Yes Yes   Sig: Take 100 mg by mouth daily   magnesium oxide (MAG-OX) 400 mg   Yes Yes   Sig: Take 1 tablet by mouth 2 (two) times a day   methazolamide (NEPTAZANE) 25 MG tablet   Yes Yes   Sig: Take 25 mg by mouth 3 (three) times a day     methazolamide (NEPTAZANE) 50 mg tablet   Yes No   Sig: Take 50 mg by mouth 2 (two) times a day     omeprazole (PriLOSEC) 40 MG capsule   Yes Yes   Sig: Take 40 mg by mouth daily   pantoprazole (PROTONIX) 40 mg tablet   Yes Yes   Sig: Take 40 mg by mouth 2 (two) times a day     prednisoLONE acetate (PRED FORTE) 1 % ophthalmic suspension   Yes Yes   Sig: Administer 1 drop to both eyes 4 (four) times a day     sertraline (ZOLOFT) 50 mg tablet   Yes Yes   Sig: Take 50 mg by mouth daily   torsemide (DEMADEX) 20 mg tablet   Yes Yes   Sig: Take 100 mg by mouth 2 (two) times a day     warfarin (COUMADIN) 5 mg tablet   Yes Yes   Sig: Take 12 5 tablets by mouth daily 12 5 M-F 13 Sat and Sunday       Facility-Administered Medications: None       Past Medical History:   Diagnosis Date    Abnormal uterine bleeding (AUB)     [facility] mellitus [facility]     Disease of thyroid gland     DVT (deep venous thrombosis) (HCC)     End stage kidney disease (HCC)     Foot ulcer due to secondary DM ([facility]     Hypertension     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     right eye    Morbid obesity [facility]     Reflux esophagitis     Thrombophlebitis of saphenous vein     Warfarin anticoagulation        Past Surgical History:   Procedure Laterality Date    ARTERIOVENOUS GRAFT PLACEMENT      CERVICAL BIOPSY W/ LOOP ELECTRODE EXCISION       SECTION      DIALYSIS FISTULA CREATION      DILATION AND CURETTAGE OF UTERUS      ENDOMETRIAL ABLATION W/ NOVASURE      PERICARDIUM SURGERY      MO LAPAROSCOPY W TOT HYSTERECT UTERUS 250 GRAM OR LESS N/A 2016    Procedure: HYSTERECTOMY LAPAROSCOPIC TOTAL ([address]), with bilateral salpingectomy;  Surgeon: Mario Niño DO;  Location: [facility];  Service: Gynecology    THROMBECTOMY / ARTERIOVENOUS GRAFT REVISION      TOE SURGERY Right     removal of the 4th toe       History reviewed  No pertinent family history  I have reviewed and agree with the history as documented  Social History   Substance Use Topics    Smoking status: Never Smoker    Smokeless tobacco: Never Used    Alcohol use No        Review of Systems   Constitutional: Negative for activity change, appetite change, chills and fever  HENT: Negative for sore throat, trouble swallowing and voice change  Eyes: Negative for photophobia  Respiratory: Negative for cough and shortness of breath  Cardiovascular: Negative for chest pain, palpitations and leg swelling  Gastrointestinal: Negative for abdominal pain, diarrhea, nausea and vomiting  Endocrine: Negative for polyphagia and polyuria  Genitourinary: Negative for difficulty urinating, dyspareunia, dysuria, vaginal discharge and vaginal pain  Musculoskeletal: Negative for back pain  Skin: Negative for rash  Allergic/Immunologic: Negative  Neurological: Positive for headaches  Negative for dizziness, tremors, seizures, syncope, facial asymmetry, speech difficulty, weakness, light-headedness and numbness  Hematological: Negative for adenopathy  Does not bruise/bleed easily  Psychiatric/Behavioral: Negative for agitation         Physical Exam  ED Triage Vitals   Temperature Pulse Respirations Blood Pressure SpO2   18 1709 18 1709 18 1709 18 1709 18 1709   (!) 97 °F (36 1 °C) 73 18 (!) 237/120 100 % Temp Source Heart Rate Source Patient Position - Orthostatic VS BP Location FiO2 (%)   05/22/18 1709 05/22/18 2231 05/22/18 2231 05/22/18 1709 --   Tympanic Monitor Lying Left arm       Pain Score       05/22/18 1709       Worst Possible Pain           Orthostatic Vital Signs  Vitals:    05/23/18 0619 05/23/18 0929 05/23/18 1403 05/23/18 1514   BP: 143/65 166/60 138/60 159/70   Pulse: 70  65 66   Patient Position - Orthostatic VS: Lying          Physical Exam   Constitutional: She is oriented to person, place, and time  She appears well-developed and well-nourished  No distress  HENT:   Head: Normocephalic and atraumatic  Right Ear: No hemotympanum  Left Ear: No hemotympanum  Nose: Nose normal  No nasal septal hematoma  Mouth/Throat: Uvula is midline, oropharynx is clear and moist and mucous membranes are normal  She does not have dentures  No oropharyngeal exudate  Eyes: Conjunctivae and EOM are normal  Pupils are equal, round, and reactive to light  Right eye exhibits no discharge  Left eye exhibits no discharge  No scleral icterus  Right eye exhibits no nystagmus  Left eye exhibits no nystagmus  Normal fundoscopic exam  Normal eom  perl3 to 2 mm bl  No nystagmus  Baseline visual acuity  No surrounding erythema/celluilitis/induration  Neck: Trachea normal, normal range of motion, full passive range of motion without pain and phonation normal  Neck supple  No JVD present  No tracheal tenderness present  No tracheal deviation present  No thyromegaly present  No c-spine tenderness   Cardiovascular: Normal rate, regular rhythm, normal heart sounds and intact distal pulses  Exam reveals no gallop and no friction rub  No murmur heard  Pulmonary/Chest: Effort normal and breath sounds normal  No stridor  No respiratory distress  She has no wheezes  She has no rales  She exhibits no tenderness  Abdominal: Soft  Bowel sounds are normal  She exhibits no distension and no mass   There is no tenderness  There is no rebound and no guarding  No hernia  Musculoskeletal: Normal range of motion  She exhibits no edema, tenderness or deformity  Lymphadenopathy:     She has no cervical adenopathy  Neurological: She is alert and oriented to person, place, and time  She has normal strength  No cranial nerve deficit or sensory deficit  GCS eye subscore is 4  GCS verbal subscore is 5  GCS motor subscore is 6  Reflex Scores:       Patellar reflexes are 2+ on the right side and 2+ on the left side  Achilles reflexes are 2+ on the right side and 2+ on the left side  nonfocal  Normal speech  Normal strength  Normal gait   Skin: Skin is warm and dry  No rash noted  She is not diaphoretic  Psychiatric: She has a normal mood and affect  Nursing note and vitals reviewed        ED Medications  Medications   sodium chloride 0 9 % bolus 250 mL (0 mL Intravenous Stopped 5/22/18 2022)   metoclopramide (REGLAN) injection 10 mg (10 mg Intravenous Given 5/22/18 1922)   HYDROmorphone (DILAUDID) injection 0 5 mg (0 5 mg Intravenous Given 5/22/18 1922)   labetalol (NORMODYNE) injection 10 mg (10 mg Intravenous Given 5/22/18 1950)   oxyCODONE-acetaminophen (PERCOCET) 5-325 mg per tablet 1 tablet (1 tablet Oral Given 5/23/18 1103)   cyclobenzaprine (FLEXERIL) tablet 10 mg (10 mg Oral Given 5/23/18 6829)       Diagnostic Studies  Results Reviewed     Procedure Component Value Units Date/Time    Basic metabolic panel [[id number]]  (Abnormal) Collected:  05/23/18 0528    Lab Status:  Final result Specimen:  Blood from Arm, Right Updated:  05/23/18 4972     Sodium 139 mmol/L      Potassium 4 2 mmol/L      Chloride 102 mmol/L      CO2 31 mmol/L      Anion Gap 6 mmol/L      BUN 45 (H) mg/dL      Creatinine 6 08 (H) mg/dL      Glucose 107 mg/dL      Glucose, Fasting 107 (H) mg/dL      Calcium 7 4 (L) mg/dL      eGFR 7 ml/min/1 73sq m     Narrative:         National Kidney Disease Education Program recommendations are as follows:  GFR calculation is accurate only with a steady state creatinine  Chronic Kidney disease less than 60 ml/min/1 73 sq  meters  Kidney failure less than 15 ml/min/1 73 sq  meters      Hemoglobin A1C w/ EAG Estimation [[id number]] Collected:  05/23/18 0528    Lab Status:  Final result Specimen:  Blood from Arm, Right Updated:  05/23/18 0612     Hemoglobin A1C 6 2 %       mg/dl     Protime-INR [[id number]]  (Abnormal) Collected:  05/23/18 0531    Lab Status:  Final result Specimen:  Blood from Arm, Right Updated:  05/23/18 0609     Protime 27 0 (H) seconds      INR 2 49 (H)    CBC and differential [[id number]]  (Abnormal) Collected:  05/23/18 0528    Lab Status:  Final result Specimen:  Blood from Arm, Right Updated:  05/23/18 0607     WBC 5 92 Thousand/uL      RBC 2 59 (L) Million/uL      Hemoglobin 7 9 (L) g/dL      Hematocrit 24 6 (L) %      MCV 95 fL      MCH 30 5 pg      MCHC 32 1 g/dL      RDW 13 9 %      MPV 10 4 fL      Platelets 331 Thousands/uL      nRBC 0 /100 WBCs      Neutrophils Relative 70 %      Lymphocytes Relative 20 %      Monocytes Relative 6 %      Eosinophils Relative 3 %      Basophils Relative 0 %      Neutrophils Absolute 4 17 Thousands/µL      Lymphocytes Absolute 1 17 Thousands/µL      Monocytes Absolute 0 38 Thousand/µL      Eosinophils Absolute 0 17 Thousand/µL      Basophils Absolute 0 02 Thousands/µL     Protime-INR [[id number]]  (Abnormal) Collected:  05/22/18 1853    Lab Status:  Final result Specimen:  Blood from Arm, Right Updated:  05/22/18 1931     Protime 26 4 (H) seconds      INR 2 42 (H)    APTT [[id number]]  (Abnormal) Collected:  05/22/18 1853    Lab Status:  Final result Specimen:  Blood from Arm, Right Updated:  05/22/18 1931     PTT 39 (H) seconds     Comprehensive metabolic panel [[id number]]  (Abnormal) Collected:  05/22/18 1853    Lab Status:  Final result Specimen:  Blood from Arm, Right Updated:  05/22/18 1929     Sodium 136 mmol/L      Potassium 4 7 mmol/L Chloride 101 mmol/L      CO2 31 mmol/L      Anion Gap 4 mmol/L      BUN 36 (H) mg/dL      Creatinine 5 30 (H) mg/dL      Glucose 114 mg/dL      Calcium 8 2 (L) mg/dL      AST 22 U/L      ALT 17 U/L      Alkaline Phosphatase 116 U/L      Total Protein 7 5 g/dL      Albumin 3 4 (L) g/dL      Total Bilirubin 0 41 mg/dL      eGFR 9 ml/min/1 73sq m     Narrative:         National Kidney Disease Education Program recommendations are as follows:  GFR calculation is accurate only with a steady state creatinine  Chronic Kidney disease less than 60 ml/min/1 73 sq  meters  Kidney failure less than 15 ml/min/1 73 sq  meters  Magnesium [[id number]]  (Normal) Collected:  05/22/18 1853    Lab Status:  Final result Specimen:  Blood from Arm, Right Updated:  05/22/18 1929     Magnesium 2 1 mg/dL     Phosphorus [[id number]]  (Abnormal) Collected:  05/22/18 1853    Lab Status:  Final result Specimen:  Blood from Arm, Right Updated:  05/22/18 1929     Phosphorus 4 8 (H) mg/dL     Troponin I [[id number]]  (Normal) Collected:  05/22/18 1853    Lab Status:  Final result Specimen:  Blood from Arm, Right Updated:  05/22/18 1928     Troponin I <0 02 ng/mL     Narrative:         [employer] Chemistry analyzer 99% cutoff is > 0 04 ng/mL in network labs    o cTnI 99% cutoff is useful only when applied to patients in the clinical setting of myocardial ischemia  o cTnI 99% cutoff should be interpreted in the context of clinical history, ECG findings and possibly cardiac imaging to establish correct diagnosis  o cTnI 99% cutoff may be suggestive but clearly not indicative of a coronary event without the clinical setting of myocardial ischemia      CBC and differential [[id number]]  (Abnormal) Collected:  05/22/18 1853    Lab Status:  Final result Specimen:  Blood from Arm, Right Updated:  05/22/18 1920     WBC 6 33 Thousand/uL      RBC 2 87 (L) Million/uL      Hemoglobin 8 9 (L) g/dL      Hematocrit 27 4 (L) %      MCV 96 fL      MCH 31 0 pg      MCHC 32 5 g/dL      RDW 13 8 %      MPV 10 8 fL      Platelets 374 Thousands/uL      nRBC 0 /100 WBCs      Neutrophils Relative 70 %      Lymphocytes Relative 20 %      Monocytes Relative 6 %      Eosinophils Relative 3 %      Basophils Relative 1 %      Neutrophils Absolute 4 43 Thousands/µL      Lymphocytes Absolute 1 28 Thousands/µL      Monocytes Absolute 0 38 Thousand/µL      Eosinophils Absolute 0 19 Thousand/µL      Basophils Absolute 0 03 Thousands/µL                  CT head without contrast   Final Result by Horacio Guerrero MD (05/22 1911)      No acute intracranial abnormality  Prominent atherosclerotic disease intra- and extra- cranially  Stable calcific mass in the left frontal area, possibly a calcified meningioma or exostosis from the skull  Workstation performed: [id number]               Procedures  Procedures      Phone Consults  ED Phone Contact    ED Course  ED Course as of May 23 2122   Tue May 22, 2018   1938 INR: (!) 2 42   1948 Blood Pressure: (!) 237/120   1952 Spoke with on-call optho doctor for dr [facility] at [facility]  They agree that this sounds like hypertensive urgency  Pt  Stopped taking her coreg recently  She is c/o generalized headache  Her pain is not specific to the eye  She has no change in her vision in her L eye  There is no surrounding erythema or induration of the eye  We will admit medically for hypertensive urgency        2023 INR: (!) 2 42                               MDM  CritCare Time    Disposition  Final diagnoses:   Headache   Hypertensive urgency     Time reflects when diagnosis was documented in both MDM as applicable and the Disposition within this note     Time User Action Codes Description Comment    5/22/2018  8:38 PM Trey Sor Add [R51] Headache     5/22/2018  8:38 PM Brad FARAH Add [I16 0] Hypertensive urgency     5/22/2018  9:53 PM Tania MCINTYRE Add [N18 6] ESRD (end stage renal disease) ([facility] )     5/22/2018  9:53 PM Kate Huitron Krista MCINTYRE Modify [N18 6] ESRD (end stage renal disease) ([facility] [address] [address] )     5/23/2018  4:01 PM Tanesha Hernandez Add [R51] Bilateral headache       ED Disposition     ED Disposition Condition Comment    Admit  Case was discussed with HARISH and the patient's admission status was agreed to be Admission Status: observation status to the service of Dr Kyle Cisneros   Follow-up Information     Follow up With Specialties Details Why Contact Info    Brown Hernandez MD Internal Medicine Follow up  [address] [facility]  [address] [place]  [address] [facility]  Follow up Call for an appt to be seen within a week for opthamology follow up  Please take the copy of your CT with you  [phone]    Sara Leos MD Neurology Follow up Call for an appt   with one of the providers in this group for the headache clinic [address] [place] [address]  [phone]            Discharge Medication List as of 5/23/2018  4:35 PM      START taking these medications    Details   acetaminophen (TYLENOL) 325 mg tablet Take 3 tablets (975 mg total) by mouth 3 (three) times a day Take for headache symptoms, Starting Wed 5/23/2018, No Print      cyclobenzaprine (FLEXERIL) 10 mg tablet Take 1 tablet (10 mg total) by mouth daily at bedtime for 5 days Take for headache, Starting Wed 5/23/2018, Until Mon 5/28/2018, Print         CONTINUE these medications which have CHANGED    Details   losartan (COZAAR) 25 mg tablet Take 1 tablet (25 mg total) by mouth daily Take on NON-Dialysis Days, Starting Wed 5/23/2018, No Print      torsemide (DEMADEX) 20 mg tablet Take 10 tablets (200 mg total) by mouth 2 (two) times a day, Starting Wed 5/23/2018, No Print         CONTINUE these medications which have NOT CHANGED    Details   ALPRAZolam (XANAX) 0 25 mg tablet Take 0 125 mg by mouth every 4 (four) hours as needed for anxiety Take on Tues, Friday and Saturday , Historical Med      atorvastatin (LIPITOR) 20 mg tablet Starting Tue 4/24/2018, Historical Med      atropine 1 % ophthalmic ointment Administer 1 application to the right eye 3 (three) times a day, Historical Med      B Complex-C-Folic Acid (TRIPHROCAPS PO) Take 1 capsule by mouth daily, Starting Wed 1/13/2016, Historical Med      brimonidine (ALPHAGAN P) 0 15 % ophthalmic solution Administer 1 drop to both eyes 3 (three) times a day  , Until Discontinued, Historical Med      CALCIUM CARBONATE PO Take 1,000 mg by mouth daily  , Until Discontinued, Historical Med      carvedilol (COREG) 25 mg tablet Take 25 mg by mouth 2 (two) times a day with meals  , Historical Med      cholecalciferol (VITAMIN D3) 1,000 units tablet Take 2,000 Units by mouth daily, Historical Med      cinacalcet (SENSIPAR) 30 mg tablet Take 30 mg by mouth daily, Historical Med      cloNIDine (CATAPRES) 0 1 mg tablet Take 0 1 mg by mouth 2 (two) times a day, Until Discontinued, Historical Med      diphenhydrAMINE (BENADRYL) 25 mg capsule Take by mouth, Historical Med      Ferric Citrate (AURYXIA) 1  MG(Fe) TABS Take by mouth, Starting Wed 3/29/2017, Historical Med      gabapentin (NEURONTIN) 100 mg capsule Take 100 mg by mouth 3 (three) times a day  , Until Discontinued, Historical Med      insulin aspart (NovoLOG) 100 units/mL injection Inject 1-5 Units under the skin 3 (three) times a day before meals As per sliding scale , Until Discontinued, Historical Med      insulin glargine (LANTUS) 100 units/mL subcutaneous injection Inject 24 Units under the skin daily at bedtime, Starting 2/19/2017, Until Discontinued, No Print      latanoprost (XALATAN) 0 005 % ophthalmic solution Administer 1 drop to both eyes daily at bedtime, Until Discontinued, Historical Med      levothyroxine 50 mcg tablet Take 50 mcg by mouth daily  , Until Discontinued, Historical Med      magnesium oxide (MAG-OX) 400 mg Take 1 tablet by mouth 2 (two) times a day, Starting Tue 8/26/2014, Historical Med      Melatonin 3 MG CAPS Take 10 mg by mouth daily at bedtime  , Historical Med      methazolamide (NEPTAZANE) 25 MG tablet Take 25 mg by mouth 3 (three) times a day  , Historical Med      omeprazole (PriLOSEC) 40 MG capsule Take 40 mg by mouth daily, Historical Med      PARoxetine (PAXIL) 10 mg tablet Take 20 mg by mouth every morning Except Tues, Friday and Saturday , Historical Med      prednisoLONE acetate (PRED FORTE) 1 % ophthalmic suspension Administer 1 drop to both eyes 4 (four) times a day  , Until Discontinued, Historical Med      sertraline (ZOLOFT) 50 mg tablet Take 50 mg by mouth daily, Historical Med      VIGAMOX 0 5 % ophthalmic solution Administer 1 drop to the right eye, Starting Fri 2/23/2018, Historical Med      warfarin (COUMADIN) 5 mg tablet Take 12 5 tablets by mouth daily 12 5 M-F 13 Sat and Sunday , Starting Fri 10/30/2015, Historical Med         STOP taking these medications       albuterol (5 mg/mL) 0 5 % nebulizer solution Comments:   Reason for Stopping:         pantoprazole (PROTONIX) 40 mg tablet Comments:   Reason for Stopping:               Outpatient Discharge Orders  Discharge Diet     Activity as tolerated         ED Provider  Attending physically available and evaluated Kely Calle I managed the patient along with the ED Attending      Electronically Signed by         Dionne Parson MD  05/23/18 1101

## 2018-05-23 VITALS
OXYGEN SATURATION: 95 % | TEMPERATURE: 97.9 F | HEIGHT: 66 IN | RESPIRATION RATE: 18 BRPM | SYSTOLIC BLOOD PRESSURE: 159 MMHG | DIASTOLIC BLOOD PRESSURE: 70 MMHG | HEART RATE: 66 BPM | BODY MASS INDEX: 41.77 KG/M2 | WEIGHT: 259.92 LBS

## 2018-05-23 LAB
ANION GAP SERPL CALCULATED.3IONS-SCNC: 6 MMOL/L (ref 4–13)
BASOPHILS # BLD AUTO: 0.02 THOUSANDS/ΜL (ref 0–0.1)
BASOPHILS NFR BLD AUTO: 0 % (ref 0–1)
BUN SERPL-MCNC: 45 MG/DL (ref 5–25)
CALCIUM SERPL-MCNC: 7.4 MG/DL (ref 8.3–10.1)
CHLORIDE SERPL-SCNC: 102 MMOL/L (ref 100–108)
CO2 SERPL-SCNC: 31 MMOL/L (ref 21–32)
CREAT SERPL-MCNC: 6.08 MG/DL (ref 0.6–1.3)
EOSINOPHIL # BLD AUTO: 0.17 THOUSAND/ΜL (ref 0–0.61)
EOSINOPHIL NFR BLD AUTO: 3 % (ref 0–6)
ERYTHROCYTE [DISTWIDTH] IN BLOOD BY AUTOMATED COUNT: 13.9 % (ref 11.6–15.1)
EST. AVERAGE GLUCOSE BLD GHB EST-MCNC: 131 MG/DL
GFR SERPL CREATININE-BSD FRML MDRD: 7 ML/MIN/1.73SQ M
GLUCOSE P FAST SERPL-MCNC: 107 MG/DL (ref 65–99)
GLUCOSE SERPL-MCNC: 107 MG/DL (ref 65–140)
GLUCOSE SERPL-MCNC: 174 MG/DL (ref 65–140)
GLUCOSE SERPL-MCNC: 198 MG/DL (ref 65–140)
GLUCOSE SERPL-MCNC: 65 MG/DL (ref 65–140)
HBA1C MFR BLD: 6.2 % (ref 4.2–6.3)
HCT VFR BLD AUTO: 24.6 % (ref 34.8–46.1)
HGB BLD-MCNC: 7.9 G/DL (ref 11.5–15.4)
INR PPP: 2.49 (ref 0.86–1.17)
LYMPHOCYTES # BLD AUTO: 1.17 THOUSANDS/ΜL (ref 0.6–4.47)
LYMPHOCYTES NFR BLD AUTO: 20 % (ref 14–44)
MCH RBC QN AUTO: 30.5 PG (ref 26.8–34.3)
MCHC RBC AUTO-ENTMCNC: 32.1 G/DL (ref 31.4–37.4)
MCV RBC AUTO: 95 FL (ref 82–98)
MONOCYTES # BLD AUTO: 0.38 THOUSAND/ΜL (ref 0.17–1.22)
MONOCYTES NFR BLD AUTO: 6 % (ref 4–12)
NEUTROPHILS # BLD AUTO: 4.17 THOUSANDS/ΜL (ref 1.85–7.62)
NEUTS SEG NFR BLD AUTO: 70 % (ref 43–75)
NRBC BLD AUTO-RTO: 0 /100 WBCS
PLATELET # BLD AUTO: 164 THOUSANDS/UL (ref 149–390)
PMV BLD AUTO: 10.4 FL (ref 8.9–12.7)
POTASSIUM SERPL-SCNC: 4.2 MMOL/L (ref 3.5–5.3)
PROTHROMBIN TIME: 27 SECONDS (ref 11.8–14.2)
RBC # BLD AUTO: 2.59 MILLION/UL (ref 3.81–5.12)
SODIUM SERPL-SCNC: 139 MMOL/L (ref 136–145)
WBC # BLD AUTO: 5.92 THOUSAND/UL (ref 4.31–10.16)

## 2018-05-23 PROCEDURE — 36415 COLL VENOUS BLD VENIPUNCTURE: CPT | Performed by: PHYSICIAN ASSISTANT

## 2018-05-23 PROCEDURE — 83036 HEMOGLOBIN GLYCOSYLATED A1C: CPT | Performed by: PHYSICIAN ASSISTANT

## 2018-05-23 PROCEDURE — 85610 PROTHROMBIN TIME: CPT | Performed by: PHYSICIAN ASSISTANT

## 2018-05-23 PROCEDURE — PBNCHG PB NO CHARGE PLACEHOLDER: Performed by: INTERNAL MEDICINE

## 2018-05-23 PROCEDURE — 82948 REAGENT STRIP/BLOOD GLUCOSE: CPT

## 2018-05-23 PROCEDURE — 99217 PR OBSERVATION CARE DISCHARGE MANAGEMENT: CPT | Performed by: PHYSICIAN ASSISTANT

## 2018-05-23 PROCEDURE — 99285 EMERGENCY DEPT VISIT HI MDM: CPT

## 2018-05-23 PROCEDURE — 85025 COMPLETE CBC W/AUTO DIFF WBC: CPT | Performed by: PHYSICIAN ASSISTANT

## 2018-05-23 PROCEDURE — 80048 BASIC METABOLIC PNL TOTAL CA: CPT | Performed by: PHYSICIAN ASSISTANT

## 2018-05-23 RX ORDER — CYCLOBENZAPRINE HCL 10 MG
10 TABLET ORAL
Qty: 30 TABLET | Refills: 0 | Status: SHIPPED | OUTPATIENT
Start: 2018-05-23 | End: 2018-10-22

## 2018-05-23 RX ORDER — ATROPINE SULFATE 10 MG/G
1 OINTMENT OPHTHALMIC 3 TIMES DAILY
Status: ON HOLD | COMMUNITY
End: 2019-04-27 | Stop reason: CLARIF

## 2018-05-23 RX ORDER — METHAZOLAMIDE 50 MG/1
50 TABLET ORAL 2 TIMES DAILY
Refills: 0 | COMMUNITY
Start: 2018-04-05 | End: 2018-05-23 | Stop reason: HOSPADM

## 2018-05-23 RX ORDER — CARVEDILOL 25 MG/1
25 TABLET ORAL 2 TIMES DAILY WITH MEALS
Status: DISCONTINUED | OUTPATIENT
Start: 2018-05-23 | End: 2018-05-23 | Stop reason: HOSPADM

## 2018-05-23 RX ORDER — LOSARTAN POTASSIUM 100 MG/1
100 TABLET ORAL DAILY
Status: ON HOLD | COMMUNITY
End: 2018-05-23

## 2018-05-23 RX ORDER — TORSEMIDE 100 MG/1
200 TABLET ORAL DAILY
Status: DISCONTINUED | OUTPATIENT
Start: 2018-05-24 | End: 2018-05-23

## 2018-05-23 RX ORDER — TORSEMIDE 100 MG/1
200 TABLET ORAL
Status: DISCONTINUED | OUTPATIENT
Start: 2018-05-23 | End: 2018-05-23 | Stop reason: HOSPADM

## 2018-05-23 RX ORDER — OXYCODONE HYDROCHLORIDE AND ACETAMINOPHEN 5; 325 MG/1; MG/1
1 TABLET ORAL EVERY 4 HOURS PRN
Status: COMPLETED | OUTPATIENT
Start: 2018-05-23 | End: 2018-05-23

## 2018-05-23 RX ORDER — MINERAL OIL AND PETROLATUM 150; 830 MG/G; MG/G
OINTMENT OPHTHALMIC
Status: DISCONTINUED | OUTPATIENT
Start: 2018-05-23 | End: 2018-05-23

## 2018-05-23 RX ORDER — MAGNESIUM SULFATE HEPTAHYDRATE 40 MG/ML
2 INJECTION, SOLUTION INTRAVENOUS EVERY 24 HOURS
Status: DISCONTINUED | OUTPATIENT
Start: 2018-05-23 | End: 2018-05-23 | Stop reason: HOSPADM

## 2018-05-23 RX ORDER — TRAMADOL HYDROCHLORIDE 50 MG/1
50 TABLET ORAL EVERY 12 HOURS PRN
Status: DISCONTINUED | OUTPATIENT
Start: 2018-05-23 | End: 2018-05-23 | Stop reason: HOSPADM

## 2018-05-23 RX ORDER — OXYCODONE HYDROCHLORIDE AND ACETAMINOPHEN 5; 325 MG/1; MG/1
1 TABLET ORAL EVERY 4 HOURS PRN
Status: DISCONTINUED | OUTPATIENT
Start: 2018-05-23 | End: 2018-05-23

## 2018-05-23 RX ORDER — MELATONIN
2000 DAILY
COMMUNITY
End: 2019-10-11

## 2018-05-23 RX ORDER — LOSARTAN POTASSIUM 50 MG/1
25 TABLET ORAL DAILY
Status: DISCONTINUED | OUTPATIENT
Start: 2018-05-23 | End: 2018-05-23 | Stop reason: HOSPADM

## 2018-05-23 RX ORDER — CINACALCET 30 MG/1
30 TABLET, FILM COATED ORAL DAILY
COMMUNITY

## 2018-05-23 RX ORDER — ACETAMINOPHEN 325 MG/1
975 TABLET ORAL EVERY 6 HOURS PRN
Status: DISCONTINUED | OUTPATIENT
Start: 2018-05-23 | End: 2018-05-23 | Stop reason: HOSPADM

## 2018-05-23 RX ORDER — LOSARTAN POTASSIUM 25 MG/1
25 TABLET ORAL DAILY
Start: 2018-05-23 | End: 2021-06-11 | Stop reason: HOSPADM

## 2018-05-23 RX ORDER — ACETAMINOPHEN 325 MG/1
975 TABLET ORAL 3 TIMES DAILY
Qty: 30 TABLET | Refills: 0 | Status: ON HOLD
Start: 2018-05-23 | End: 2019-04-27 | Stop reason: CLARIF

## 2018-05-23 RX ORDER — TRAMADOL HYDROCHLORIDE 50 MG/1
50 TABLET ORAL EVERY 4 HOURS PRN
Status: DISCONTINUED | OUTPATIENT
Start: 2018-05-23 | End: 2018-05-23

## 2018-05-23 RX ORDER — TORSEMIDE 20 MG/1
200 TABLET ORAL
Refills: 0 | Status: ON HOLD
Start: 2018-05-23 | End: 2019-04-27 | Stop reason: CLARIF

## 2018-05-23 RX ORDER — POLYVINYL ALCOHOL 14 MG/ML
1 SOLUTION/ DROPS OPHTHALMIC
Status: DISCONTINUED | OUTPATIENT
Start: 2018-05-23 | End: 2018-05-23 | Stop reason: HOSPADM

## 2018-05-23 RX ORDER — CYCLOBENZAPRINE HCL 10 MG
10 TABLET ORAL ONCE
Status: COMPLETED | OUTPATIENT
Start: 2018-05-23 | End: 2018-05-23

## 2018-05-23 RX ORDER — MOXIFLOXACIN HCL 0.5 %
1 DROPS OPHTHALMIC (EYE)
Refills: 0 | COMMUNITY
Start: 2018-02-23 | End: 2019-08-31 | Stop reason: HOSPADM

## 2018-05-23 RX ADMIN — BRIMONIDINE TARTRATE 1 DROP: 1.5 SOLUTION OPHTHALMIC at 09:56

## 2018-05-23 RX ADMIN — PREDNISOLONE ACETATE 1 DROP: 10 SUSPENSION/ DROPS OPHTHALMIC at 00:42

## 2018-05-23 RX ADMIN — CALCIUM 500 MG: 500 TABLET ORAL at 09:42

## 2018-05-23 RX ADMIN — GABAPENTIN 100 MG: 100 CAPSULE ORAL at 16:26

## 2018-05-23 RX ADMIN — MAGNESIUM SULFATE HEPTAHYDRATE 2 G: 40 INJECTION, SOLUTION INTRAVENOUS at 15:06

## 2018-05-23 RX ADMIN — PANTOPRAZOLE SODIUM 40 MG: 40 TABLET, DELAYED RELEASE ORAL at 05:27

## 2018-05-23 RX ADMIN — Medication 400 MG: at 09:41

## 2018-05-23 RX ADMIN — DORZOLAMIDE HYDROCHLORIDE AND TIMOLOL MALEATE 1 DROP: 20; 5 SOLUTION/ DROPS OPHTHALMIC at 09:55

## 2018-05-23 RX ADMIN — LOSARTAN POTASSIUM 25 MG: 50 TABLET, FILM COATED ORAL at 14:03

## 2018-05-23 RX ADMIN — RENAGEL 800 MG: 400 TABLET ORAL at 16:26

## 2018-05-23 RX ADMIN — MELATONIN TAB 3 MG 10.5 MG: 3 TAB at 00:21

## 2018-05-23 RX ADMIN — PREDNISOLONE ACETATE 1 DROP: 10 SUSPENSION/ DROPS OPHTHALMIC at 09:55

## 2018-05-23 RX ADMIN — GABAPENTIN 100 MG: 100 CAPSULE ORAL at 00:03

## 2018-05-23 RX ADMIN — INSULIN LISPRO 2 UNITS: 100 INJECTION, SOLUTION INTRAVENOUS; SUBCUTANEOUS at 16:27

## 2018-05-23 RX ADMIN — CARVEDILOL 25 MG: 25 TABLET, FILM COATED ORAL at 09:42

## 2018-05-23 RX ADMIN — TORSEMIDE 100 MG: 100 TABLET ORAL at 09:44

## 2018-05-23 RX ADMIN — CLONIDINE HYDROCHLORIDE 0.1 MG: 0.1 TABLET ORAL at 10:38

## 2018-05-23 RX ADMIN — CYCLOBENZAPRINE HYDROCHLORIDE 10 MG: 10 TABLET, FILM COATED ORAL at 14:59

## 2018-05-23 RX ADMIN — BRIMONIDINE TARTRATE 1 DROP: 1.5 SOLUTION OPHTHALMIC at 00:04

## 2018-05-23 RX ADMIN — TORSEMIDE 20 MG: 20 TABLET ORAL at 09:42

## 2018-05-23 RX ADMIN — LATANOPROST 1 DROP: 50 SOLUTION OPHTHALMIC at 00:16

## 2018-05-23 RX ADMIN — LEVOTHYROXINE SODIUM 50 MCG: 50 TABLET ORAL at 05:27

## 2018-05-23 RX ADMIN — INSULIN LISPRO 2 UNITS: 100 INJECTION, SOLUTION INTRAVENOUS; SUBCUTANEOUS at 10:40

## 2018-05-23 RX ADMIN — BRIMONIDINE TARTRATE 1 DROP: 1.5 SOLUTION OPHTHALMIC at 16:26

## 2018-05-23 RX ADMIN — ACETAMINOPHEN 975 MG: 325 TABLET, FILM COATED ORAL at 10:38

## 2018-05-23 RX ADMIN — PANTOPRAZOLE SODIUM 40 MG: 40 TABLET, DELAYED RELEASE ORAL at 16:26

## 2018-05-23 RX ADMIN — OXYCODONE HYDROCHLORIDE AND ACETAMINOPHEN 1 TABLET: 5; 325 TABLET ORAL at 00:15

## 2018-05-23 RX ADMIN — ATORVASTATIN CALCIUM 20 MG: 20 TABLET, FILM COATED ORAL at 16:26

## 2018-05-23 RX ADMIN — OXYCODONE HYDROCHLORIDE AND ACETAMINOPHEN 1 TABLET: 5; 325 TABLET ORAL at 04:53

## 2018-05-23 RX ADMIN — RENAGEL 800 MG: 400 TABLET ORAL at 09:42

## 2018-05-23 RX ADMIN — PREDNISOLONE ACETATE 1 DROP: 10 SUSPENSION/ DROPS OPHTHALMIC at 12:08

## 2018-05-23 RX ADMIN — GABAPENTIN 100 MG: 100 CAPSULE ORAL at 09:42

## 2018-05-23 RX ADMIN — PAROXETINE HYDROCHLORIDE HEMIHYDRATE 20 MG: 20 TABLET, FILM COATED ORAL at 09:43

## 2018-05-23 RX ADMIN — ZINC 1 TABLET: TAB ORAL at 09:42

## 2018-05-23 RX ADMIN — TRAMADOL HYDROCHLORIDE 50 MG: 50 TABLET, FILM COATED ORAL at 06:50

## 2018-05-23 NOTE — H&P
H&P- José Antonio Rosado 1965, 48 y o  female MRN: [id number]    Unit/Bed#: ED 20 Encounter: [id number]    Primary Care Provider: Myron Waller MD   Date and time admitted to hospital: 5/22/2018  6:06 PM        * Hypertensive urgency   Assessment & Plan    -presents to ED with severe HA since Friday after dialysis and eye injection tx  -BP in /120, s/p 10 mg labetalol BP now 167/72   -Pt reports she often has elevated pressures before dialysis, with recent episode of hypotension prompting a change in her Coreg dose from bid to qd  -Continue home meds clonidine, coreg  -Monitor Bps, pt may need further adjustment in antihypertensive regimen             Bilateral headache   Assessment & Plan    -Pt reports HA began with pain behind R eye and eye felt like it was "turning inwards" initially after dialysis on Friday and eye injection tx   States HA progressed to b/l   -Pt reports she gets headaches often after dialysis, this HA is similar although with more rapid onset this time  -s/p Dilaudid and BP control in ED, pt reports HA has improved  -CT head (-)  -HA likely d/t elevated Bps   -Will give dose of tramadol prn for pain            Type 2 diabetes mellitus (HCC)   Assessment & Plan    -Glucose 115 in ED, last A1c 6 5 1 year ago, will recheck  -Continue Lantus 28U at bedtime, ISS, monitor FS         Anxiety disorder   Assessment & Plan    -Pt takes Paxil for anxiety, reports she is not able to take Paxil on dialysis days therefore she takes Xanax prn on dialysis days (T, Th, Sat)  -Continue current regimen        ESRD on hemodialysis [facility])   Assessment & Plan    -Dialysis T, Th, Sat with last tx today 5/22  -Continue torsemide and BP meds  -Nephrology c/s        Visual disturbance   Assessment & Plan    -Pt has hx of glaucoma with partial blindness in R eye and preserved vision in L eye  -Receives injections in L eye at [address] [facility], [place]  -Pt on Neptazan po at home not available here, as per pharmacy will substitute w/ Cosopt drops         History of DVT (deep vein thrombosis)   Assessment & Plan    -Reports she was dx with RUE DVT 3 years ago for which she takes Coumadin 12 5 mg qd  -INR 2 42  -Continue Coumadin 12 5 mg qd              VTE Prophylaxis: Warfarin (Coumadin)  / sequential compression device   Code Status: Full Code  POLST: POLST form is not discussed and not completed at this time  Discussion with family: no    Anticipated Length of Stay:  Patient will be admitted on an Observation basis with an anticipated length of stay of  < 2 midnights  Justification for Hospital Stay: hypertensive urgency    Total Time for Visit, including Counseling / Coordination of Care: 45 minutes  Greater than 50% of this total time spent on direct patient counseling and coordination of care  Chief Complaint:   HA x4 days    History of Present Illness:    Cristina Turner is a 48 y o  female with PMHx ESRD on dialysis, HTN, T2DM, glaucoma, hx of DVT on Coumadin who presents with HA x4 days  Pt reports she received dialysis on Friday and subsequently went for her eye injection at [facility] as she has glaucoma and is partially blind in R eye, injections preserve vision in her L eye  States since this time she began experiencing severe headache  States the HA started behind her R eye with associated redness and feeling like her eye was "turning inwards"  She reports BAUMANN progressed to b/l eyes  Reports HA waxes and wanes, dull in nature, took Percocet which help relieved the pain  Pt also received dilaudid and reglan in ED which pt reports alleviated HA, now 5/10  CT head in ED was wnl, BP found to be elevated to SBP 220s in ED treated with labetalol  Pt states she did not take her antihypertensive medications today  Review of Systems:    Review of Systems   Constitutional: Negative for appetite change, chills, fatigue, fever and unexpected weight change     HENT: Negative for congestion, hearing loss and sinus pressure  Eyes: Positive for photophobia and redness  Negative for pain, discharge and visual disturbance  Respiratory: Negative for cough, chest tightness, shortness of breath and wheezing  Cardiovascular: Negative for chest pain, palpitations and leg swelling  Gastrointestinal: Negative for abdominal distention, abdominal pain, blood in stool, constipation, diarrhea, nausea and vomiting  Genitourinary: Negative for decreased urine volume, difficulty urinating and dysuria  Musculoskeletal: Negative for arthralgias  Skin: Negative for rash  Neurological: Positive for headaches  Negative for dizziness, syncope, speech difficulty, weakness, light-headedness and numbness  Past Medical and Surgical History:     Past Medical History:   Diagnosis Date    Abnormal uterine bleeding (AUB)     Diabetes mellitus [facility] )     Disease of thyroid gland     DVT (deep venous thrombosis) (HCC)     End stage kidney disease (HCC)     Foot ulcer due to secondary DM ([facility] )     Hypertension     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[facility] )     right eye    Morbid obesity ([facility] )     Reflux esophagitis     Thrombophlebitis of saphenous vein     Warfarin anticoagulation        Past Surgical History:   Procedure Laterality Date    ARTERIOVENOUS GRAFT PLACEMENT      CERVICAL BIOPSY  W/ LOOP ELECTRODE EXCISION       SECTION      DIALYSIS FISTULA CREATION      DILATION AND CURETTAGE OF UTERUS      ENDOMETRIAL ABLATION W/ NOVASURE      PERICARDIUM SURGERY      SC LAPAROSCOPY W TOT HYSTERECT UTERUS 250 GRAM OR LESS N/A 2016    Procedure: HYSTERECTOMY LAPAROSCOPIC TOTAL ([address]), with bilateral salpingectomy;  Surgeon: Jean Pierre Swartz DO;  Location: [facility];  Service: Gynecology    THROMBECTOMY / ARTERIOVENOUS GRAFT REVISION      TOE SURGERY Right     removal of the 4th toe       Meds/Allergies:    Prior to Admission medications    Medication Sig Start Date End Date Taking?  Authorizing Provider   albuterol (5 mg/mL) 0 5 % nebulizer solution Inhale   Yes Historical Provider, MD   ALPRAZolam Jaquita Hardeman) 0 25 mg tablet Take 0 125 mg by mouth every 4 (four) hours as needed for anxiety Take on Tues, Friday and Saturday    Yes Historical Provider, MD   atorvastatin (LIPITOR) 20 mg tablet  4/24/18  Yes Historical Provider, MD   B Complex-C-Folic Acid (TRIPHROCAPS PO) Take 1 capsule by mouth daily 1/13/16  Yes Historical Provider, MD   brimonidine (ALPHAGAN P) 0 15 % ophthalmic solution Administer 1 drop to both eyes 3 (three) times a day     Yes Historical Provider, MD   CALCIUM CARBONATE PO Take 1,000 mg by mouth daily  Yes Historical Provider, MD   carvedilol (COREG) 25 mg tablet Take 25 mg by mouth daily     Yes Historical Provider, MD   cloNIDine (CATAPRES) 0 1 mg tablet Take 0 1 mg by mouth 2 (two) times a day   Yes Historical Provider, MD   diphenhydrAMINE (BENADRYL) 25 mg capsule Take by mouth   Yes Historical Provider, MD   Ferric Citrate (AURYXIA) 1  MG(Fe) TABS Take by mouth 3/29/17  Yes Historical Provider, MD   gabapentin (NEURONTIN) 100 mg capsule Take 100 mg by mouth 3 (three) times a day     Yes Historical Provider, MD   insulin aspart (NovoLOG) 100 units/mL injection Inject 1-5 Units under the skin 3 (three) times a day before meals As per sliding scale    Yes Historical Provider, MD   insulin glargine (LANTUS) 100 units/mL subcutaneous injection Inject 24 Units under the skin daily at bedtime  Patient taking differently: Inject 28 Units under the skin daily at bedtime   2/19/17  Yes Jacque Maria MD   latanoprost (XALATAN) 0 005 % ophthalmic solution Administer 1 drop to both eyes daily at bedtime   Yes Historical Provider, MD   levothyroxine 50 mcg tablet Take 50 mcg by mouth daily     Yes Historical Provider, MD   magnesium oxide (MAG-OX) 400 mg Take 1 tablet by mouth 2 (two) times a day 8/26/14  Yes Historical Provider, MD   Melatonin 3 MG CAPS Take 10 mg by mouth daily at bedtime     Yes Historical Provider, MD   methazolamide (NEPTAZANE) 25 MG tablet Take 25 mg by mouth 3 (three) times a day     Yes Historical Provider, MD   omeprazole (PriLOSEC) 40 MG capsule Take 40 mg by mouth daily   Yes Historical Provider, MD   pantoprazole (PROTONIX) 40 mg tablet Take 40 mg by mouth 2 (two) times a day  Yes Historical Provider, MD   PARoxetine (PAXIL) 10 mg tablet Take 20 mg by mouth every morning Except Tues, Friday and Saturday    Yes Historical Provider, MD   prednisoLONE acetate (PRED FORTE) 1 % ophthalmic suspension Administer 1 drop to both eyes 4 (four) times a day     Yes Historical Provider, MD   torsemide (DEMADEX) 20 mg tablet Take 100 mg by mouth     Yes Historical Provider, MD   warfarin (COUMADIN) 5 mg tablet Take 12 5 tablets by mouth daily 12 5 M-F 13 Sat and [place]  10/30/15  Yes Historical Provider, MD   acetaZOLAMIDE (DIAMOX) 250 mg tablet Take 1 tablet by mouth 2 (two) times a day 12/13/17 5/22/18  Historical Provider, MD   amLODIPine (NORVASC) 10 mg tablet Take 10 mg by mouth daily  5/22/18  Historical Provider, MD   chlorhexidine (HIBICLENS) 4 % external liquid Apply 1 application topically daily as needed for wound care 9/9/17 5/22/18  Zandra Keyes MD   lanthanum (FOSRENOL) 500 mg chewable tablet Chew  5/22/18  Historical Provider, MD   losartan (COZAAR) 100 MG tablet Take 100 mg by mouth daily  5/22/18  Historical Provider, MD   neomycin-bacitracin-polymyxin b (NEOSPORIN) ointment Apply topically 2 (two) times a day 1/30/18 5/22/18  Roxi Lorenz DO     I have reviewed home medications with patient personally  Allergies:    Allergies   Allergen Reactions    Iodinated Diagnostic Agents Itching       Social History:     Marital Status: Single   Occupation: retired  Patient Pre-hospital Living Situation: lives with family at home  Patient Pre-hospital Level of Mobility: fully mobile no restrictiosn  Patient Pre-hospital Diet Restrictions: renal diet  Substance Use History:   History   Alcohol Use No     History   Smoking Status    Never Smoker   Smokeless Tobacco    Never Used     History   Drug Use No       Family History:    History reviewed  No pertinent family history  Physical Exam:     Vitals:   Blood Pressure: 167/72 (05/22/18 2100)  Pulse: 79 (05/22/18 2100)  Temperature: (!) 97 °F (36 1 °C) (05/22/18 1709)  Temp Source: Tympanic (05/22/18 1709)  Respirations: 18 (05/22/18 2100)  Weight - Scale: 118 kg (260 lb 2 3 oz) (05/22/18 1709)  SpO2: 100 % (05/22/18 2100)    Physical Exam   Constitutional: She is oriented to person, place, and time  She appears well-developed  No distress  Obese     HENT:   Head: Normocephalic and atraumatic  Mouth/Throat: Oropharynx is clear and moist  No oropharyngeal exudate  Eyes: EOM are normal  Right eye exhibits no discharge  Left eye exhibits no discharge  No scleral icterus  R pupillary defect, chronic as per pt  R eye conjunctiva slightly injected  L eye wnl  No periorbital swelling b/l  Visual acuity decreased R eye   Neck: Normal range of motion  Neck supple  Cardiovascular: Normal rate, regular rhythm, normal heart sounds and intact distal pulses  No murmur heard  LUE fistula with palpable thrill and audible bruit   Pulmonary/Chest: Effort normal and breath sounds normal  No respiratory distress  She has no wheezes  She exhibits no tenderness  Abdominal: Soft  Bowel sounds are normal  She exhibits no distension  There is no tenderness  There is no guarding  Musculoskeletal: Normal range of motion  She exhibits no edema  s/p amputation R 4th toe   Neurological: She is alert and oriented to person, place, and time  No cranial nerve deficit  Skin: Skin is warm and dry  Psychiatric: She has a normal mood and affect  Additional Data:     Lab Results: I have personally reviewed pertinent reports          Results from last 7 days  Lab Units 05/22/18  1853   WBC Thousand/uL 6 33 HEMOGLOBIN g/dL 8 9*   HEMATOCRIT % 27 4*   PLATELETS Thousands/uL 176   NEUTROS PCT % 70   LYMPHS PCT % 20   MONOS PCT % 6   EOS PCT % 3       Results from last 7 days  Lab Units 05/22/18  1853   SODIUM mmol/L 136   POTASSIUM mmol/L 4 7   CHLORIDE mmol/L 101   CO2 mmol/L 31   BUN mg/dL 36*   CREATININE mg/dL 5 30*   CALCIUM mg/dL 8 2*   TOTAL PROTEIN g/dL 7 5   BILIRUBIN TOTAL mg/dL 0 41   ALK PHOS U/L 116   ALT U/L 17   AST U/L 22   GLUCOSE RANDOM mg/dL 114       Results from last 7 days  Lab Units 05/22/18  1853   INR  2 42*               Imaging: I have personally reviewed pertinent reports  CT head without contrast   Final Result by Tony Snyder MD (05/22 1911)      No acute intracranial abnormality  Prominent atherosclerotic disease intra- and extra- cranially  Stable calcific mass in the left frontal area, possibly a calcified meningioma or exostosis from the skull  Workstation performed: [id number]             EKG, Pathology, and Other Studies Reviewed on Admission:   · EKG:     Allscripts / Epic Records Reviewed: Yes     ** Please Note: This note has been constructed using a voice recognition system   **

## 2018-05-23 NOTE — ASSESSMENT & PLAN NOTE
-Pt reports HA began with pain behind R eye after dialysis on Friday and eye injection tx  States HA progressed to b/l  Better with blood pressure control, but still present  -CT head (-)  -HA likely exacerbated by elevated BP    -Will give Mg and flexeril prior to discharge  -Discussed avoiding narcotic for headache control  She takes Percocet frequently for her headaches  Discussed obtaining outpatient consultation in the [facility] as she experiences chronic daily headaches

## 2018-05-23 NOTE — CASE MANAGEMENT
Initial Clinical Review    Admission: Date/Time/Statement: Observation 5/22 @ 2039    Orders Placed This Encounter   Procedures    Place in Observation (expected length of stay for this patient is less than two midnights)     Standing Status:   Standing     Number of Occurrences:   1     Order Specific Question:   Admitting Physician     Answer:   Yumiko Ring [[id number]]     Order Specific Question:   Level of Care     Answer:   Med Surg [16]         ED: Date/Time/Mode of Arrival:   ED Arrival Information     Expected Arrival Acuity Means of Arrival Escorted By Service Admission Type    - 5/22/2018 17:03 Emergent [facility]    Arrival Complaint    Headache          Chief Complaint:   Chief Complaint   Patient presents with    Headache     pt with headache, pt had her left eye injected at [facility], pt with pain also in her right eye with redness and feels like it is turning inward, pt with a nose bleed at dialysis also today        History of Illness:  48 y o  female with PMHx ESRD on dialysis, HTN, T2DM, glaucoma, hx of DVT on Coumadin who presents with HA x4 days  Pt reports she received dialysis on Friday and subsequently went for her eye injection at [facility] as she has glaucoma and is partially blind in R eye, injections preserve vision in her L eye  States since this time she began experiencing severe headache  States the HA started behind her R eye with associated redness and feeling like her eye was "turning inwards"  She reports BAUMANN progressed to b/l eyes  Reports HA waxes and wanes, dull in nature, took Percocet which help relieved the pain  Pt also received dilaudid and reglan in ED which pt reports alleviated HA, now 5/10  CT head in ED was wnl, BP found to be elevated to SBP 220s in ED treated with labetalol  Pt states she did not take her antihypertensive medications today       ED Vital Signs:   ED Triage Vitals   Temperature Pulse Respirations Blood Pressure SpO2   05/22/18 [phone] 05/22/18 1709 05/22/18 1709 05/22/18 1709 05/22/18 1709   (!) 97 °F (36 1 °C) 73 18 (!) 237/120 100 %      Temp Source Heart Rate Source Patient Position - Orthostatic VS BP Location FiO2 (%)   05/22/18 1709 05/22/18 2231 05/22/18 2231 05/22/18 1709 --   Tympanic Monitor Lying Left arm       Pain Score       05/22/18 1709       Worst Possible Pain        Wt Readings from Last 1 Encounters:   05/23/18 118 kg (259 lb 14 8 oz)       Vital Signs (abnormal):   05/22/18 2231  --  71  20   189/79  94 %  None (Room air)  Lying   05/22/18 2100  --  79  18  167/72  100 %  --  --   05/22/18 2010  --  81  16   197/84  100 %  None (Room air)  --   05/22/18 1930  --  76  16   221/98           Abnormal Labs:    05/22/18 1853    WBC 4 31 - 10 16 Thousand/uL 6 33    RBC 3 81 - 5 12 Million/uL 2 87     Hemoglobin 11 5 - 15 4 g/dL 8 9     Hematocrit 34 8 - 46 1 % 27 4       05/23/18 0528     WBC 4 31 - 10 16 Thousand/uL 5 92    RBC 3 81 - 5 12 Million/uL 2 59     Hemoglobin 11 5 - 15 4 g/dL 7 9     Hematocrit 34 8 - 46 1 % 24 6        05/23/18 0528    Sodium 136 - 145 mmol/L 139    Potassium 3 5 - 5 3 mmol/L 4 2    Chloride 100 - 108 mmol/L 102    CO2 21 - 32 mmol/L 31    Anion Gap 4 - 13 mmol/L 6    BUN 5 - 25 mg/dL 45     Creatinine 0 60 - 1 30 mg/dL 6 08        05/22/18 1853    Protime 11 8 - 14 2 seconds 26 4     INR 0 86 - 1 17 2 42         Diagnostic Test Results: CT Head - No acute intracranial abnormality  Prominent atherosclerotic disease intra- and extra- cranially  Stable calcific mass in the left frontal area, possibly a calcified meningioma or exostosis from the skull      ED Treatment:   Medication Administration from 05/22/2018 1703 to 05/23/2018 0601       Date/Time Order Dose Route Action     05/22/2018 1922 sodium chloride 0 9 % bolus 250 mL 250 mL Intravenous New Bag     05/22/2018 1922 metoclopramide (REGLAN) injection 10 mg 10 mg Intravenous Given     05/22/2018 1922 HYDROmorphone (DILAUDID) injection 0 5 mg 0 5 mg Intravenous Given     05/22/2018 1950 labetalol (NORMODYNE) injection 10 mg 10 mg Intravenous Given     05/23/2018 0004 brimonidine (ALPHAGAN P) 0 15 % ophthalmic solution 1 drop 1 drop Both Eyes Given     05/23/2018 0003 gabapentin (NEURONTIN) capsule 100 mg 100 mg Oral Given     05/22/2018 2331 insulin glargine (LANTUS) subcutaneous injection 28 Units 0 28 mL 28 Units Subcutaneous Given     05/23/2018 0016 latanoprost (XALATAN) 0 005 % ophthalmic solution 1 drop 1 drop Both Eyes Given     05/23/2018 0527 levothyroxine tablet 50 mcg 50 mcg Oral Given     05/23/2018 0021 melatonin tablet 10 5 mg 10 5 mg Oral Given     05/23/2018 0527 pantoprazole (PROTONIX) EC tablet 40 mg 40 mg Oral Given     05/23/2018 0042 prednisoLONE acetate (PRED FORTE) 1 % ophthalmic suspension 1 drop 1 drop Both Eyes Given     05/22/2018 2238 traMADol (ULTRAM) tablet 50 mg 50 mg Oral Given     05/22/2018 2233 dorzolamide-timolol (COSOPT) 22 3-6 8 MG/ML ophthalmic solution 1 drop 1 drop Both Eyes Given     05/23/2018 0015 oxyCODONE-acetaminophen (PERCOCET) 5-325 mg per tablet 1 tablet 1 tablet Oral Given     05/23/2018 0453 oxyCODONE-acetaminophen (PERCOCET) 5-325 mg per tablet 1 tablet 1 tablet Oral Given          Past Medical/Surgical History:    Active Ambulatory Problems     Diagnosis Date Noted    Hypoglycemia 01/25/2016    ESRD (end stage renal disease) ([facility] ) 01/25/2016    Anemia of chronic disease 01/26/2016    Hypertensive urgency 01/26/2016    Essential hypertension 02/14/2016    History of DVT (deep vein thrombosis) 02/14/2016    Abnormal uterine bleeding 02/15/2016    Glaucoma 07/01/2016    ESRD on hemodialysis ([facility] ) 07/01/2016    Hyperkalemia 07/12/2016    Dyspnea 02/18/2017    Fluid overload 02/19/2017    Noncompliance 02/19/2017    Accelerated hypertension 04/06/2017    Anxiety disorder 04/06/2017    Type 2 diabetes mellitus ([facility] ) 08/08/2017    Abscess 08/08/2017     Resolved Ambulatory Problems Diagnosis Date Noted    Coagulopathy ([place] [address] ) 01/26/2016    Fever 07/12/2016    Lower abdominal pain 07/12/2016     Past Medical History:   Diagnosis Date    Abnormal uterine bleeding (AUB)     Diabetes mellitus ([place] [address] )     Disease of thyroid gland     [facility]     End stage kidney disease ([facility])     Foot ulcer due to secondary DM ([place] [address] )     Hypertension     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[place] [address] )     Morbid obesity ([facility])     Reflux esophagitis     Thrombophlebitis of saphenous vein     Warfarin anticoagulation        Admitting Diagnosis: ESRD (end stage renal disease) ([place] [address] ) [N18 6]  Hypertensive urgency [I16 0]  Headache [R51]  Headache [R51]    Age/Sex: 48 y o  female    Assessment/Plan:   * Hypertensive urgency   Assessment & Plan     -presents to ED with severe HA since Friday after dialysis and eye injection tx  -BP in /120, s/p 10 mg labetalol BP now 167/72   -Pt reports she often has elevated pressures before dialysis, with recent episode of hypotension prompting a change in her Coreg dose from bid to qd  -Continue home meds clonidine, coreg  -Monitor Bps, pt may need further adjustment in antihypertensive regimen                 Bilateral headache   Assessment & Plan     -Pt reports HA began with pain behind R eye and eye felt like it was "turning inwards" initially after dialysis on Friday and eye injection tx   States HA progressed to b/l   -Pt reports she gets headaches often after dialysis, this HA is similar although with more rapid onset this time  -s/p Dilaudid and BP control in ED, pt reports HA has improved  -CT head (-)  -HA likely d/t elevated Bps   -Will give dose of tramadol prn for pain                Type 2 diabetes mellitus (HCC)   Assessment & Plan     -Glucose 115 in ED, last A1c 6 5 1 year ago, will recheck  -Continue Lantus 28U at bedtime, ISS, monitor FS           Anxiety disorder   Assessment & Plan     -Pt takes Paxil for anxiety, reports she is not able to take Paxil on dialysis days therefore she takes Xanax prn on dialysis days (T, Th, Sat)  -Continue current regimen          ESRD on hemodialysis [facility])   Assessment & Plan     -Dialysis T, Th, Sat with last tx today 5/22  -Continue torsemide and BP meds  -Nephrology c/s          Visual disturbance   Assessment & Plan     -Pt has hx of glaucoma with partial blindness in R eye and preserved vision in L eye  -Receives injections in L eye at [address] [facility], [place]  -Pt on Neptazan po at home not available here, as per pharmacy will substitute w/ Cosopt drops           History of DVT (deep vein thrombosis)   Assessment & Plan     -Reports she was dx with RUE DVT 3 years ago for which she takes Coumadin 12 5 mg qd  -INR 2 42  -Continue Coumadin 12 5 mg qd                VTE Prophylaxis: Warfarin (Coumadin)  / sequential compression device   Code Status: Full Code  POLST: POLST form is not discussed and not completed at this time    Discussion with family: no       Admission Orders:  Nephrology cons    Scheduled Meds:   Current Facility-Administered Medications:  atorvastatin 20 mg Oral Daily With Dinner   brimonidine 1 drop Both Eyes TID   calcium carbonate 500 mg Oral BID With Meals   carvedilol 25 mg Oral Daily   cloNIDine 0 1 mg Oral BID   dorzolamide-timolol 1 drop Both Eyes BID   gabapentin 100 mg Oral TID   insulin glargine 28 Units Subcutaneous HS   insulin lispro 2-12 Units Subcutaneous TID AC   latanoprost 1 drop Both Eyes HS   levothyroxine 50 mcg Oral Early Morning   magnesium oxide 400 mg Oral BID   melatonin 10 5 mg Oral HS   multivitamin stress formula 1 tablet Oral Daily   pantoprazole 40 mg Oral BID   PARoxetine 20 mg Oral QAM   prednisoLONE acetate 1 drop Both Eyes 4x Daily   sevelamer 800 mg Oral TID With Meals   torsemide 100 mg Oral Daily   torsemide 20 mg Oral Daily   warfarin 12 5 mg Oral Daily (warfarin)     Continuous Infusions:    PRN Meds:   albuterol   ALPRAZolam    diphenhydrAMINE    polyvinyl alcohol    traMADol po x2  Oxycodone po x1

## 2018-05-23 NOTE — ASSESSMENT & PLAN NOTE
-Pt has hx of glaucoma with partial blindness in R eye and preserved vision in L eye  -Receives injections in L eye at [address] [place] [address]  -Pt on Neptazan po at home not available here, as per pharmacy will substitute w/ Cosopt drops   -Patient reports her right eye tracks inward which is new since her opthalmology appt on Friday  Spoke with Dr Valerie Dawn at [facility]  His group will see the patient in follow up within a week at their clinic

## 2018-05-23 NOTE — ASSESSMENT & PLAN NOTE
-presents to ED with severe HA since Friday after dialysis and eye injection tx  -BP in /120, s/p 10 mg labetalol BP now 167/72   -Pt reports she often has elevated pressures before dialysis, with recent episode of hypotension prompting a change in her Coreg dose from bid to qd  -Continue home meds clonidine, coreg  -Monitor Bps, pt may need further adjustment in antihypertensive regimen

## 2018-05-23 NOTE — DISCHARGE SUMMARY
Discharge- Pearl Henry 1965, 48 y o  female MRN: [id number]    Unit/Bed#: [facility] 217-03 Encounter: [id number]    Primary Care Provider: Elmer Mckenna MD   Date and time admitted to hospital: 5/22/2018  6:06 PM        * Hypertensive urgency   Assessment & Plan    -presents to ED with severe HA since Friday after dialysis and eye injection tx  -BP in /120, s/p 10 mg labetalol with improvement   -Nephrology input appreciated  - Coreg changed to 25mg BID hold am dose on dialysis days  - Losartan 25mg on non-dialysis days added  -Discussed with Dr Radha Lopes, stable from his standpoint for discharge  Bilateral headache   Assessment & Plan    -Pt reports HA began with pain behind R eye after dialysis on Friday and eye injection tx  States HA progressed to b/l  Better with blood pressure control, but still present  -CT head (-)  -HA likely exacerbated by elevated BP    -Will give Mg and flexeril prior to discharge  -Discussed avoiding narcotic for headache control  She takes Percocet frequently for her headaches  Discussed obtaining outpatient consultation in the [facility] as she experiences chronic daily headaches  Glaucoma   Assessment & Plan    -Pt has hx of glaucoma with partial blindness in R eye and preserved vision in L eye  -Receives injections in L eye at [address] [place] [address]  -Pt on Neptazan po at home not available here, as per pharmacy will substitute w/ Cosopt drops   -Patient reports her right eye tracks inward which is new since her opthalmology appt on Friday  Spoke with Dr Allen Marks at [address]  His group will see the patient in follow up within a week at their clinic          ESRD on hemodialysis [facility])   Assessment & Plan    -Dialysis T, Th, Sat with last tx today 5/22  -Continue torsemide and BP meds  -Nephrology c/s appreciated         Type 2 diabetes mellitus (HCC)   Assessment & Plan    -HbA1c 6 2  -Continue Lantus 28U at bedtime, ISS, monitor FS         History of DVT (deep vein thrombosis)   Assessment & Plan    -Reports she was dx with RUE DVT 3 years ago for which she takes Coumadin 12 5 mg qd  -INR 2 42  -Continue Coumadin 12 5 mg qd        Anxiety disorder   Assessment & Plan    -Pt takes Paxil for anxiety, reports she is not able to take Paxil on dialysis days therefore she takes Xanax prn on dialysis days (T, Th, Sat)  -Continue current regimen            Discharging Physician / Practitioner: Evi Mireles PA-C  PCP: Alfredo Banda MD  Admission Date:   Admission Orders     Ordered        05/22/18 2038  Place in Observation (expected length of stay for this patient is less than two midnights)  Once             Discharge Date: 05/23/18    Resolved Problems  Date Reviewed: 5/23/2018    None          Consultations During Hospital Stay:  · Dr Artie Arrieta    Procedures Performed:     CT head - No acute intracranial abnormality  Prominent atherosclerotic disease intra- and extra- cranially  Stable calcific mass in the left frontal area, possibly a calcified meningioma or exostosis from the skull    Significant Findings / Test Results:     · See above    Incidental Findings:   · none     Test Results Pending at Discharge (will require follow up):   · none     Outpatient Tests Requested:  · none    Complications:  none    Reason for Admission: severe headache    Hospital Course:     Kylee Tiwari is a 48 y o  female patient who originally presented to the hospital on 5/22/2018 due to severe headache  Patient was found to have hypertensive urgency with a blood pressure of 237/120  Patient was given IV labetalol with improvement of her blood pressure  She was seen in consultation by Nephrology  Her Coreg was increased to 25 mg twice a day - patient not to take a m  dose on dialysis days  Patient also started on losartan 25 mg on non dialysis days    Patient with good improvement of her blood pressure and will be stable for discharge home   Patient's headache likely multifactorial   Patient appears to have a chronic daily headache worse after dialysis  Her current acute headache was likely exacerbated by hypertensive urgency  Patient's headache improved with treatment of blood pressure  She was also given IV magnesium and Flexeril for persistent headache symptoms  Patient was instructed to avoid narcotics for routine headache relief  It was recommended that she obtain outpatient consultation with the [address] [place] [address] [facility]  Patient also had complaints of right eye laziness  Her right eye has been tracking medially for the last 3 days  This is new since her ophthalmology appointment on 05/18  I spoke with her ophthalmologist at [place]  Plan is for patient to follow up in their outpatient clinic within the week  Please see above list of diagnoses and related plan for additional information  Condition at Discharge: good     Discharge Day Visit / Exam:     Subjective:  Still with headache, but not as severe  Vitals: Blood Pressure: 159/70 (05/23/18 1514)  Pulse: 66 (05/23/18 1514)  Temperature: 97 9 °F (36 6 °C) (05/23/18 1514)  Temp Source: Oral (05/23/18 1407)  Respirations: 18 (05/23/18 1514)  Height: 5' 6" (167 6 cm) (05/23/18 0569)  Weight - Scale: 118 kg (259 lb 14 8 oz) (05/23/18 0619)  SpO2: 95 % (05/23/18 1514)  Exam:   Physical Exam   Constitutional: She is oriented to person, place, and time  She appears well-developed and well-nourished  No distress  HENT:   Head: Normocephalic and atraumatic  Eyes: EOM are normal    Right eye drifts inward   Cardiovascular: Normal rate and regular rhythm  Exam reveals no friction rub  No murmur heard  Pulmonary/Chest: Effort normal and breath sounds normal  No respiratory distress  She has no wheezes  Abdominal: Soft  Bowel sounds are normal  She exhibits no distension  There is no tenderness  There is no rebound and no guarding     Musculoskeletal: She exhibits no edema  Neurological: She is alert and oriented to person, place, and time  No cranial nerve deficit  Skin: Skin is warm and dry  No rash noted  Psychiatric: She has a normal mood and affect  Nursing note and vitals reviewed  Discussion with Family:  at bedside    Discharge instructions/Information to patient and family:   See after visit summary for information provided to patient and family  Provisions for Follow-Up Care:  See after visit summary for information related to follow-up care and any pertinent home health orders  Disposition:     Home    For Discharges to [facility]:   · Not Applicable to this Patient - Not Applicable to this Patient    Planned Readmission: none     Discharge Statement:  I spent 60 minutes discharging the patient  This time was spent on the day of discharge  I had direct contact with the patient on the day of discharge  Greater than 50% of the total time was spent examining patient, answering all patient questions, arranging and discussing plan of care with patient as well as directly providing post-discharge instructions  Additional time then spent on discharge activities  Discharge Medications:  See after visit summary for reconciled discharge medications provided to patient and family        ** Please Note: This note has been constructed using a voice recognition system **

## 2018-05-23 NOTE — ASSESSMENT & PLAN NOTE
-Pt reports HA began with pain behind R eye and eye felt like it was "turning inwards" initially after dialysis on Friday and eye injection tx   States HA progressed to b/l   -Pt reports she gets headaches often after dialysis, this HA is similar although with more rapid onset this time  -s/p Dilaudid and BP control in ED, pt reports HA has improved  -CT head (-)  -HA likely d/t elevated Bps   -Will give dose of tramadol prn for pain

## 2018-05-23 NOTE — PLAN OF CARE
Problem: Potential for Falls  Goal: Patient will remain free of falls  INTERVENTIONS:  - Assess patient frequently for physical needs  -  Identify cognitive and physical deficits and behaviors that affect risk of falls    -  [place] fall precautions as indicated by assessment   - Educate patient/family on patient safety including physical limitations  - Instruct patient to call for assistance with activity based on assessment  - Modify environment to reduce risk of injury  - Consider OT/PT consult to assist with strengthening/mobility   Outcome: Progressing

## 2018-05-23 NOTE — ASSESSMENT & PLAN NOTE
-Pt has hx of glaucoma with partial blindness in R eye and preserved vision in L eye  -Receives injections in L eye at [address] [place] [address]  -Pt on Neptazan po at home not available here, as per pharmacy will substitute w/ Cosopt drops

## 2018-05-23 NOTE — CONSULTS
Consultation - Nephrology   Chilo Farrell 48 y o  female MRN: [id number]  Unit/Bed#: [facility] 217-03 Encounter: [id number]      ASSESSMENT / PLAN:     1  End-stage renal disease  · Plan for usual hemodialysis treatment on 05/24  · Volume status seems acceptable  2  Hypertension  · Will resume b i d  dosing on carvedilol and hold a m  dose on days of dialysis  · Restart losartan on date she is not receiving dialysis  · Trend blood pressure response as well as post dialysis blood pressure response with these changes  3  Volume status  · Acceptable  4  Anemia  5  Headache  · Likely related to her eye injection, inadequate blood pressure control in hemodialysis treatment  · Spoke with slim team today (Adri Veliz)who will contact Ophthalmology at [facility] to see if further workup is needed                  History of Present Illness   Physician Requesting Consult: Liz Valle MD  Reason for Consult / Principal Problem -end-stage renal disease/hypertension   HPI- Chilo Farrell is a 48 y o  y o  female who we are asked to see because of end-stage renal disease/hypertension  Alan Luis is a 30-year-old female who is maintained on chronic hemodialysis on a Tuesday Thursday Saturday schedule  She did receive her last scheduled dialysis treatment  Of note is that she did have injection in her left eye this last Friday  When she does receive these injections, she does have headaches on occasion  She also has headaches with her hemodialysis treatments  She now presents with similar headache but more intense than usual   The headache did not subside over the last several days and escalate after her last dialysis treatment  Thus, she presented to the emergency room for further evaluation upon the advice of her ophthalmologist from [facility]  The vision in the left eye has not been affected  She denies any other neurologic complaints, nauseousness, vomiting, change in mental status    Her systolic blood pressure was noted to be markedly elevated in the emergency room with a systolic blood pressure in the 220s  Of note, recently, over the last 1-2 months, her losartan was discontinued and her carvedilol dose was decreased due to post hemodialysis hypotension  The headache has improved somewhat but did still persists when I saw her earlier today  History obtained from chart review and the patient    Inpatient consult to Nephrology  Consult performed by: Esthela Delgado ordered by: Emerick Homans          Review of Systems   Constitutional: Negative for appetite change, chills, fatigue, fever and unexpected weight change (She has lost weight due to dieting in an effort to get on the transplant list)  HENT: Negative for sinus pressure  Eyes: Negative for redness and visual disturbance  Respiratory: Negative for cough, shortness of breath and wheezing  Cardiovascular: Negative for chest pain, palpitations and leg swelling  Gastrointestinal: Negative for abdominal pain, constipation, diarrhea, nausea and vomiting  Endocrine: Negative for polyuria  Genitourinary: Positive for decreased urine volume  Negative for flank pain  Musculoskeletal: Negative for arthralgias, back pain and joint swelling  Skin: Negative for rash  Neurological: Positive for headaches  Negative for dizziness and syncope  Hematological: Does not bruise/bleed easily  Psychiatric/Behavioral: Negative for confusion and sleep disturbance         Historical Information   Patient Active Problem List   Diagnosis    Hypoglycemia    [facility]    Anemia of chronic disease    Hypertensive urgency    Essential hypertension    History of DVT (deep vein thrombosis)    Abnormal uterine bleeding    Glaucoma    [facility]    Hyperkalemia    Dyspnea    Fluid overload    Noncompliance    Accelerated hypertension    Anxiety disorder    Type 2 diabetes mellitus (HCC)    Abscess    Bilateral headache     Past Medical History:   Diagnosis Date    Abnormal uterine bleeding (AUB)     [facility]     Disease of thyroid gland     DVT (deep venous thrombosis) (HCC)     End stage kidney disease (HCC)     Foot ulcer due to secondary DM ([facility]     Hypertension     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     right eye    Morbid obesity [facility]     Reflux esophagitis     Thrombophlebitis of saphenous vein     Warfarin anticoagulation      Past Surgical History:   Procedure Laterality Date    ARTERIOVENOUS GRAFT PLACEMENT      CERVICAL BIOPSY  W/ LOOP ELECTRODE EXCISION       SECTION      DIALYSIS FISTULA CREATION      DILATION AND CURETTAGE OF UTERUS      ENDOMETRIAL ABLATION W/ NOVASURE      PERICARDIUM SURGERY      MS LAPAROSCOPY W TOT HYSTERECT UTERUS 250 GRAM OR LESS N/A 2016    Procedure: HYSTERECTOMY LAPAROSCOPIC TOTAL ([address]), with bilateral salpingectomy;  Surgeon: Nehal Patle DO;  [facility];  Service: Gynecology    THROMBECTOMY / ARTERIOVENOUS GRAFT REVISION      TOE SURGERY Right     removal of the 4th toe     Social History   History   Alcohol Use No     History   Drug Use No     History   Smoking Status    Never Smoker   Smokeless Tobacco    Never Used     History reviewed  No pertinent family history      Meds/Allergies   all current active meds have been reviewed, current meds:   Current Facility-Administered Medications   Medication Dose Route Frequency    acetaminophen (TYLENOL) tablet 975 mg  975 mg Oral Q6H PRN    albuterol inhalation solution 2 5 mg  2 5 mg Nebulization Q6H PRN    ALPRAZolam (XANAX) tablet 0 125 mg  0 125 mg Oral Q4H PRN    atorvastatin (LIPITOR) tablet 20 mg  20 mg Oral Daily With Dinner    brimonidine (ALPHAGAN P) 0 15 % ophthalmic solution 1 drop  1 drop Both Eyes TID    calcium carbonate (OYSTER SHELL,OSCAL) 500 mg tablet 500 mg  500 mg Oral BID With Meals    carvedilol (COREG) tablet 25 mg  25 mg Oral BID With Meals    cloNIDine (CATAPRES) tablet 0 1 mg  0 1 mg Oral BID    diphenhydrAMINE (BENADRYL) tablet 25 mg  25 mg Oral Q8H PRN    dorzolamide-timolol (COSOPT) 22 3-6 8 MG/ML ophthalmic solution 1 drop  1 drop Both Eyes BID    gabapentin (NEURONTIN) capsule 100 mg  100 mg Oral TID    insulin glargine (LANTUS) subcutaneous injection 28 Units 0 28 mL  28 Units Subcutaneous HS    insulin lispro (HumaLOG) 100 units/mL subcutaneous injection 2-12 Units  2-12 Units Subcutaneous TID AC    latanoprost (XALATAN) 0 005 % ophthalmic solution 1 drop  1 drop Both Eyes HS    levothyroxine tablet 50 mcg  50 mcg Oral Early Morning    magnesium oxide (MAG-OX) tablet 400 mg  400 mg Oral BID    melatonin tablet 10 5 mg  10 5 mg Oral HS    multivitamin stress formula tablet 1 tablet  1 tablet Oral Daily    pantoprazole (PROTONIX) EC tablet 40 mg  40 mg Oral BID    PARoxetine (PAXIL) tablet 20 mg  20 mg Oral QAM    polyvinyl alcohol (LIQUIFILM TEARS) 1 4 % ophthalmic solution 1 drop  1 drop Both Eyes Q3H PRN    prednisoLONE acetate (PRED FORTE) 1 % ophthalmic suspension 1 drop  1 drop Both Eyes 4x Daily    sevelamer (RENAGEL) tablet 800 mg  800 mg Oral TID With Meals    torsemide (DEMADEX) tablet 200 mg  200 mg Oral BID (diuretic)    traMADol (ULTRAM) tablet 50 mg  50 mg Oral Q12H PRN    warfarin (COUMADIN) tablet 12 5 mg  12 5 mg Oral Daily (warfarin)    and PTA meds:   Prior to Admission Medications   Prescriptions Last Dose Informant Patient Reported? Taking? ALPRAZolam (XANAX) 0 25 mg tablet   Yes Yes   Sig: Take 0 125 mg by mouth every 4 (four) hours as needed for anxiety Take on Tues, Friday and Saturday    B Complex-C-Folic Acid (TRIPHROCAPS PO)   Yes Yes   Sig: Take 1 capsule by mouth daily   CALCIUM CARBONATE PO   Yes Yes   Sig: Take 1,000 mg by mouth daily     Ferric Citrate (AURYXIA) 1  MG(Fe) TABS   Yes Yes   Sig: Take by mouth   Melatonin 3 MG CAPS   Yes Yes   Sig: Take 10 mg by mouth daily at bedtime     PARoxetine (PAXIL) 10 mg tablet   Yes Yes   Sig: Take 20 mg by mouth every morning Except Tues, Friday and Saturday    VIGAMOX 0 5 % ophthalmic solution   Yes No   Sig: Administer 1 drop to the right eye   atorvastatin (LIPITOR) 20 mg tablet   Yes Yes   atropine 1 % ophthalmic ointment   Yes Yes   Sig: Administer 1 application to the right eye 3 (three) times a day   brimonidine (ALPHAGAN P) 0 15 % ophthalmic solution   Yes Yes   Sig: Administer 1 drop to both eyes 3 (three) times a day     carvedilol (COREG) 25 mg tablet   Yes Yes   Sig: Take 25 mg by mouth 2 (two) times a day with meals     cholecalciferol (VITAMIN D3) 1,000 units tablet   Yes Yes   Sig: Take 2,000 Units by mouth daily   cinacalcet (SENSIPAR) 30 mg tablet   Yes Yes   Sig: Take 30 mg by mouth daily   cloNIDine (CATAPRES) 0 1 mg tablet   Yes Yes   Sig: Take 0 1 mg by mouth 2 (two) times a day   diphenhydrAMINE (BENADRYL) 25 mg capsule   Yes Yes   Sig: Take by mouth   gabapentin (NEURONTIN) 100 mg capsule   Yes Yes   Sig: Take 100 mg by mouth 3 (three) times a day     insulin aspart (NovoLOG) 100 units/mL injection   Yes Yes   Sig: Inject 1-5 Units under the skin 3 (three) times a day before meals As per sliding scale    insulin glargine (LANTUS) 100 units/mL subcutaneous injection   No Yes   Sig: Inject 24 Units under the skin daily at bedtime   Patient taking differently: Inject 28 Units under the skin daily at bedtime     latanoprost (XALATAN) 0 005 % ophthalmic solution   Yes Yes   Sig: Administer 1 drop to both eyes daily at bedtime   levothyroxine 50 mcg tablet   Yes Yes   Sig: Take 50 mcg by mouth daily     losartan (COZAAR) 100 MG tablet   Yes Yes   Sig: Take 100 mg by mouth daily   magnesium oxide (MAG-OX) 400 mg   Yes Yes   Sig: Take 1 tablet by mouth 2 (two) times a day   methazolamide (NEPTAZANE) 25 MG tablet   Yes Yes   Sig: Take 25 mg by mouth 3 (three) times a day     methazolamide (NEPTAZANE) 50 mg tablet   Yes No   Sig: Take 50 mg by mouth 2 (two) times a day     omeprazole (PriLOSEC) 40 MG capsule   Yes Yes   Sig: Take 40 mg by mouth daily   pantoprazole (PROTONIX) 40 mg tablet   Yes Yes   Sig: Take 40 mg by mouth 2 (two) times a day     prednisoLONE acetate (PRED FORTE) 1 % ophthalmic suspension   Yes Yes   Sig: Administer 1 drop to both eyes 4 (four) times a day     sertraline (ZOLOFT) 50 mg tablet   Yes Yes   Sig: Take 50 mg by mouth daily   torsemide (DEMADEX) 20 mg tablet   Yes Yes   Sig: Take 100 mg by mouth 2 (two) times a day     warfarin (COUMADIN) 5 mg tablet   Yes Yes   Sig: Take 12 5 tablets by mouth daily 12 5 M-F 13 Sat and Sunday       Facility-Administered Medications: None       Scheduled Meds:  Current Facility-Administered Medications:  acetaminophen 975 mg Oral Q6H PRN Jhonathan LILLIANA Richter   albuterol 2 5 mg Nebulization Q6H PRN Odella Feil, PA-C   ALPRAZolam 0 125 mg Oral Q4H PRN Odella Andrel, PA-C   atorvastatin 20 mg Oral Daily With [employer], LILLIANA   brimonidine 1 drop Both Eyes TID Odella RALPH Michelle-C   calcium carbonate 500 mg Oral BID With Meals Odella ANDREW MichelleC   carvedilol 25 mg Oral BID With Meals Jhonathan Richter PA-C   cloNIDine 0 1 mg Oral BID Odella Feipromise, PA-C   diphenhydrAMINE 25 mg Oral Q8H PRN Odella RALPH Michelle-C   dorzolamide-timolol 1 drop Both Eyes BID Odella RALPH Michelle-C   gabapentin 100 mg Oral TID Odella Feipromise PA-C   insulin glargine 28 Units Subcutaneous HS Odella Miguel Angel, PA-C   insulin lispro 2-12 Units Subcutaneous TID ERASMO Brenner PA-C   latanoprost 1 drop Both Eyes HS Odella Feil, PA-C   levothyroxine 50 mcg Oral Early Morning Odella Feipromise, PA-C   magnesium oxide 400 mg Oral BID Odella Miguel Angel, PA-C   melatonin 10 5 mg Oral HS Odella Feil, PA-C   multivitamin stress formula 1 tablet Oral Daily Odella Feil, PA-C   pantoprazole 40 mg Oral BID Odella Feil, PA-C   PARoxetine 20 mg Oral QAM Caprice RALPH Jackson-C   polyvinyl alcohol 1 drop Both Eyes Q3H PRN Caprice Raddle, PA-C   prednisoLONE acetate 1 drop Both Eyes 4x Daily Caprice Manuel, PA-C   sevelamer 800 mg Oral TID With Meals Caprice Radshelly, PA-C   torsemide 200 mg Oral BID (diuretic) Gabriela Holley PA-C   traMADol 50 mg Oral Q12H PRN Gabriela LILLIANA Holley   warfarin 12 5 mg Oral Daily (warfarin) Caprice Manuel PA-C       PRN Meds:   acetaminophen    albuterol    ALPRAZolam    diphenhydrAMINE    polyvinyl alcohol    traMADol    Continuous Infusions: Allergies   Allergen Reactions    Iodinated Diagnostic Agents Itching             /60 (BP Location: Right arm)   Pulse 70   Temp 97 7 °F (36 5 °C) (Oral)   Resp 18   Ht 5' 6" (1 676 m)   Wt 118 kg (259 lb 14 8 oz)   LMP 2016   SpO2 93%   BMI 41 95 kg/m²   Temp (24hrs), Av 4 °F (36 3 °C), Min:97 °F (36 1 °C), Max:97 7 °F (36 5 °C)      Objective     Intake/Output Summary (Last 24 hours) at 18 1308  Last data filed at 18 5565   Gross per 24 hour   Intake              430 ml   Output                0 ml   Net              430 ml       I/O last 24 hours:   In: 200 [P O :180; IV Piggyback:250]  Out: 0       Current Weight: Weight - Scale: 118 kg (259 lb 14 8 oz)  First Weight: Weight - Scale: 118 kg (260 lb 2 3 oz)    PHYSICAL EXAM:     General -awake, alert, no apparent distress and cooperative  HENT  -normocephalic/atraumatic, poor dentition, mucous membranes were moist  Eyes    -blind in right eye, extraocular movements seemed intact, no overt scleral icterus was noted  Neck    -no jugular venous distention was noted, supple, no thyromegaly was noted, trachea midline  Chest  -clear to auscultation and percussion, adequate inspiratory effort, no rales or wheezing was noted, no rhonchi were noted  CVS  -S1-S2, no pericardial friction rub or S3 were appreciated  Abdomen -soft, nontender, normoactive bowel sounds, no rebound or guarding was noted  Extremities-no cyanosis or edema was noted  Skin   -no hives were  Neuro   -alert and oriented and answering questions appropriately  Psych   -mood affect seemed appropriate      Invasive Devices     Peripheral Intravenous Line            Peripheral IV 05/22/18 Right Forearm less than 1 day          Line            Hemodialysis AV Fistula 02/20/18 Left Forearm 91 days                Lab Results:      Results from last 7 days  Lab Units 05/23/18  0528 05/22/18  1853   WBC Thousand/uL 5 92 6 33   HEMOGLOBIN g/dL 7 9* 8 9*   HEMATOCRIT % 24 6* 27 4*   PLATELETS Thousands/uL 164 176   NEUTROS PCT % 70 70   LYMPHS PCT % 20 20   MONOS PCT % 6 6   EOS PCT % 3 3   SODIUM mmol/L 139 136   POTASSIUM mmol/L 4 2 4 7   CHLORIDE mmol/L 102 101   CO2 mmol/L 31 31   BUN mg/dL 45* 36*   CREATININE mg/dL 6 08* 5 30*   CALCIUM mg/dL 7 4* 8 2*   TOTAL PROTEIN g/dL  --  7 5   BILIRUBIN TOTAL mg/dL  --  0 41   ALK PHOS U/L  --  116   ALT U/L  --  17   AST U/L  --  22   GLUCOSE RANDOM mg/dL 107 114   MAGNESIUM mg/dL  --  2 1   PHOSPHORUS mg/dL  --  4 8*       CUTURES:  Lab Results   Component Value Date    BLOODCX No Growth After 5 Days  08/08/2017    BLOODCX No Growth After 5 Days  08/08/2017    BLOODCX No Growth After 5 Days  07/11/2016    BLOODCX No Growth After 5 Days  07/11/2016    URINECX >100,000 cfu/ml Lactobacillus species 07/12/2016    URINECX >100,000 cfu/ml Mixed Contaminants X3 07/12/2016         EKG, Pathology, and Other Studies -all appropriate studies were reviewed          Portions of the record may have been created with voice recognition software  Occasional wrong word or "sound a like" substitutions may have occurred due to the inherent limitations of voice recognition software  Read the chart carefully and recognize, using context, where substitutions have occurred  If you have any questions, please contact the dictating provider

## 2018-05-23 NOTE — ED NOTES
Pt aware vigamox and methazolamide are non-formulary and family bring in the AM      Kris Sam RN  05/23/18 7946

## 2018-05-23 NOTE — ASSESSMENT & PLAN NOTE
-Glucose 115 in ED, last A1c 6 5 1 year ago, will recheck  -Continue Lantus 28U at bedtime, ISS, monitor FS

## 2018-05-23 NOTE — ASSESSMENT & PLAN NOTE
-presents to ED with severe HA since Friday after dialysis and eye injection tx  -BP in /120, s/p 10 mg labetalol with improvement   -Nephrology input appreciated  - Coreg changed to 25mg BID hold am dose on dialysis days  - Losartan 25mg on non-dialysis days added  -Discussed with Dr Ivone Sanchez, stable from his standpoint for discharge

## 2018-05-23 NOTE — ASSESSMENT & PLAN NOTE
-Reports she was dx with RUE DVT 3 years ago for which she takes Coumadin 12 5 mg qd  -INR 2 42  -Continue Coumadin 12 5 mg qd

## 2018-05-23 NOTE — ASSESSMENT & PLAN NOTE
-Pt takes Paxil for anxiety, reports she is not able to take Paxil on dialysis days therefore she takes Xanax prn on dialysis days (T, Th, Sat)  -Continue current regimen

## 2018-05-23 NOTE — ASSESSMENT & PLAN NOTE
-Dialysis T, Th, Sat with last tx today 5/22  -Continue torsemide and BP meds  -Nephrology c/s appreciated

## 2018-06-12 ENCOUNTER — TELEPHONE (OUTPATIENT)
Dept: NEPHROLOGY | Facility: CLINIC | Age: 53
End: 2018-06-12

## 2018-06-12 NOTE — TELEPHONE ENCOUNTER
Called and spoke to patient regarding [facility] transplant program   She is still interested and will call them this afternoon after dialysis  Patient was given the [facility] transplant appointment line

## 2018-07-13 ENCOUNTER — TELEPHONE (OUTPATIENT)
Dept: NEPHROLOGY | Facility: CLINIC | Age: 53
End: 2018-07-13

## 2018-07-13 NOTE — TELEPHONE ENCOUNTER
Left message for patient to call the office  She needs to contact [facility] and schedule her Eval   If she does not due this by 8 1 18 then they will close her evaluation and she will have to start again  I also called and spoke to Verito Baptiste at [facility] and she is going to try calling the patient and leave a note on patients chart for tomorrow  Thank you

## 2018-07-30 ENCOUNTER — HOSPITAL ENCOUNTER (EMERGENCY)
Facility: HOSPITAL | Age: 53
Discharge: HOME | End: 2018-07-30
Attending: EMERGENCY MEDICINE
Payer: MEDICARE

## 2018-07-30 VITALS
SYSTOLIC BLOOD PRESSURE: 177 MMHG | RESPIRATION RATE: 20 BRPM | WEIGHT: 240 LBS | BODY MASS INDEX: 38.57 KG/M2 | TEMPERATURE: 98.2 F | HEIGHT: 66 IN | HEART RATE: 85 BPM | DIASTOLIC BLOOD PRESSURE: 80 MMHG | OXYGEN SATURATION: 98 %

## 2018-07-30 DIAGNOSIS — R79.1 SUPRATHERAPEUTIC INR: ICD-10-CM

## 2018-07-30 DIAGNOSIS — R04.0 EPISTAXIS: Primary | ICD-10-CM

## 2018-07-30 DIAGNOSIS — I10 HYPERTENSION: ICD-10-CM

## 2018-07-30 DIAGNOSIS — L02.91 ABSCESS: ICD-10-CM

## 2018-07-30 LAB
ANION GAP BLD CALC-SCNC: 19 MMOL/L (ref 4–13)
BUN BLD-MCNC: 64 MG/DL (ref 5–25)
CA-I BLD-SCNC: 1.05 MMOL/L (ref 1.12–1.32)
CHLORIDE BLD-SCNC: 99 MMOL/L (ref 100–108)
CREAT BLD-MCNC: 8.5 MG/DL (ref 0.6–1.3)
GFR SERPL CREATININE-BSD FRML MDRD: 5 ML/MIN/1.73SQ M
GLUCOSE SERPL-MCNC: 196 MG/DL (ref 65–140)
HCT VFR BLD CALC: 24 % (ref 34.8–46.1)
HGB BLDA-MCNC: 8.2 G/DL (ref 11.5–15.4)
INR PPP: 3.46 (ref 0.86–1.17)
PCO2 BLD: 27 MMOL/L (ref 21–32)
POTASSIUM BLD-SCNC: 4.9 MMOL/L (ref 3.5–5.3)
PROTHROMBIN TIME: 34.8 SECONDS (ref 11.8–14.2)
SODIUM BLD-SCNC: 138 MMOL/L (ref 136–145)
SPECIMEN SOURCE: ABNORMAL

## 2018-07-30 PROCEDURE — 85610 PROTHROMBIN TIME: CPT | Performed by: EMERGENCY MEDICINE

## 2018-07-30 PROCEDURE — 99283 EMERGENCY DEPT VISIT LOW MDM: CPT

## 2018-07-30 PROCEDURE — 80047 BASIC METABLC PNL IONIZED CA: CPT

## 2018-07-30 PROCEDURE — 36415 COLL VENOUS BLD VENIPUNCTURE: CPT | Performed by: EMERGENCY MEDICINE

## 2018-07-30 PROCEDURE — 85014 HEMATOCRIT: CPT

## 2018-07-30 RX ORDER — CEPHALEXIN 250 MG/1
500 CAPSULE ORAL ONCE
Status: DISCONTINUED | OUTPATIENT
Start: 2018-07-30 | End: 2018-07-31 | Stop reason: HOSPADM

## 2018-07-30 RX ORDER — SULFAMETHOXAZOLE AND TRIMETHOPRIM 800; 160 MG/1; MG/1
1 TABLET ORAL 2 TIMES DAILY
Qty: 14 TABLET | Refills: 0 | Status: SHIPPED | OUTPATIENT
Start: 2018-07-30 | End: 2018-08-06

## 2018-07-30 RX ORDER — CEPHALEXIN 500 MG/1
500 CAPSULE ORAL EVERY 6 HOURS SCHEDULED
Qty: 28 CAPSULE | Refills: 0 | Status: SHIPPED | OUTPATIENT
Start: 2018-07-30 | End: 2018-08-06

## 2018-07-30 RX ORDER — SULFAMETHOXAZOLE AND TRIMETHOPRIM 800; 160 MG/1; MG/1
1 TABLET ORAL ONCE
Status: DISCONTINUED | OUTPATIENT
Start: 2018-07-30 | End: 2018-07-31 | Stop reason: HOSPADM

## 2018-07-30 RX ORDER — LIDOCAINE HYDROCHLORIDE AND EPINEPHRINE 10; 10 MG/ML; UG/ML
5 INJECTION, SOLUTION INFILTRATION; PERINEURAL ONCE
Status: COMPLETED | OUTPATIENT
Start: 2018-07-30 | End: 2018-07-30

## 2018-07-30 RX ADMIN — SILVER NITRATE APPLICATORS 1 APPLICATOR: 25; 75 STICK TOPICAL at 20:49

## 2018-07-30 RX ADMIN — LIDOCAINE HYDROCHLORIDE AND EPINEPHRINE 5 ML: 10; 10 INJECTION, SOLUTION INFILTRATION; PERINEURAL at 21:20

## 2018-07-30 NOTE — ED ATTENDING ATTESTATION
Ervin Rea MD, saw and evaluated the patient  I have discussed the patient with the resident/non-physician practitioner and agree with the resident's/non-physician practitioner's findings, Plan of Care, and MDM as documented in the resident's/non-physician practitioner's note, except where noted  All available labs and Radiology studies were reviewed  At this point I agree with the current assessment done in the Emergency Department  I have conducted an independent evaluation of this patient a history and physical is as follows:    OA: 49 y/o f on coumadin with epistaxis that began earlier today  Bleeding improved throughout the day but still with oozing so presents for evaluation  Pt states earlier she had a headache, currently resolved  Pt admits to using vaseline and Vicks recently for dry air in her home  When asked specifically, she denies weakness or dizziness  No cp/sob  No headache  No n/v  BP initially elevated in triage but repeat improved and per record review inline with previous BPs while in the hospital  Pt also notes that she has lost weight and does not feel fluid overloaded  Compliant with medication and dialysis, has dialysis tomorrow  Also c/o small abscess on her abdomen just inferior to her R breast, no surroudning cellulitis  PE, well develoepd f, sitting upright and in NAD, HTN/otherwise VSS, epistaxis currently controlled, NC/AT, MMM, pink conjunctiva, clear sclera, neck supple/FROM, RR, lungs CTAb, abd soft, +Bs, -r/g, ULLOA, - edema, intact distal pulses and capillary refill < 2 sec, + small quarter sized abscess, no surrounding erythema/warmth, intact distal pulses and capillaryrefill < 2 sec  A/p epistaxis on coumadin, check INR as bleeding currently controlled   Check istat chem for Hbg and baseline electrolyte, monitor BP in ED, re-eval and dispo accordingly    Critical Care Time  CritCare Time    Procedures

## 2018-07-31 NOTE — ED PROVIDER NOTES
History  Chief Complaint   Patient presents with    Nose Bleed     pt states she has been bleeding out of right rare for over 5 hours  pt takes coumadin  pt c/o dizziness and weakness  pt states she is also anemic and on dialysis     48year-old woman with a history of ESRD on T/Th/Sat HD and DVT on coumadin presents for evaluation of epistaxis  She reports persistent oozing from the right nares starting at noon without inciting trauma  Positive passage of clots and postnasal drip  Bleeding has been intermittent with direct pressure  She notes some intermittent dizziness with syncope, chest pain, dyspnea, or palpitations  No nausea or vomiting  Goal INR is 1 5-2 5 and baseline hgb is <8  No history of epistaxis or sinus/nasal surgery  On arrival, patient is hypertensive to >200 SBP with otherwise normal vital signs  Physical exam shows a slow ooze from a vessel in the anterior plexus in the right nares  No septal hematoma or oropharyngeal blood  Will apply direct pressure and chemically cauterize blood vessel  Will check INR and hgb  Will recheck blood pressure  Patient also complaining of a superficial abdominal wall abscess that started as a papule 1-2 weeks ago  No redness or drainage  No fevers or chills  No history of recurrent abscess or MRSA  She is afebrile and not tachycardia  There is a 2-3 cm area of fluctuance on the abdominal wall without significant surrounding erythema  Bedside ultrasound shows a small fluid collection  Will perform I & D  Will administer antibiotics given high risk patient  Prior to Admission Medications   Prescriptions Last Dose Informant Patient Reported? Taking?    ALPRAZolam (XANAX) 0 25 mg tablet   Yes No   Sig: Take 0 125 mg by mouth every 4 (four) hours as needed for anxiety Take on Tues, Friday and Saturday    B Complex-C-Folic Acid (TRIPHROCAPS PO)   Yes No   Sig: Take 1 capsule by mouth daily   CALCIUM CARBONATE PO   Yes No   Sig: Take 1,000 mg by mouth daily    Ferric Citrate (AURYXIA) 1  MG(Fe) TABS   Yes No   Sig: Take by mouth   Melatonin 3 MG CAPS   Yes No   Sig: Take 10 mg by mouth daily at bedtime     PARoxetine (PAXIL) 10 mg tablet   Yes No   Sig: Take 20 mg by mouth every morning Except Tues, Friday and Saturday    VIGAMOX 0 5 % ophthalmic solution   Yes No   Sig: Administer 1 drop to the right eye   acetaminophen (TYLENOL) 325 mg tablet   No No   Sig: Take 3 tablets (975 mg total) by mouth 3 (three) times a day Take for headache symptoms   atorvastatin (LIPITOR) 20 mg tablet   Yes No   atropine 1 % ophthalmic ointment   Yes No   Sig: Administer 1 application to the right eye 3 (three) times a day   brimonidine (ALPHAGAN P) 0 15 % ophthalmic solution   Yes No   Sig: Administer 1 drop to both eyes 3 (three) times a day     carvedilol (COREG) 25 mg tablet   Yes No   Sig: Take 25 mg by mouth 2 (two) times a day with meals     cholecalciferol (VITAMIN D3) 1,000 units tablet   Yes No   Sig: Take 2,000 Units by mouth daily   cinacalcet (SENSIPAR) 30 mg tablet   Yes No   Sig: Take 30 mg by mouth daily   cloNIDine (CATAPRES) 0 1 mg tablet   Yes No   Sig: Take 0 1 mg by mouth 2 (two) times a day   cyclobenzaprine (FLEXERIL) 10 mg tablet   No No   Sig: Take 1 tablet (10 mg total) by mouth daily at bedtime for 5 days Take for headache   diphenhydrAMINE (BENADRYL) 25 mg capsule   Yes No   Sig: Take by mouth   gabapentin (NEURONTIN) 100 mg capsule   Yes No   Sig: Take 100 mg by mouth 3 (three) times a day     insulin aspart (NovoLOG) 100 units/mL injection   Yes No   Sig: Inject 1-5 Units under the skin 3 (three) times a day before meals As per sliding scale    insulin glargine (LANTUS) 100 units/mL subcutaneous injection   No No   Sig: Inject 24 Units under the skin daily at bedtime   Patient taking differently: Inject 28 Units under the skin daily at bedtime     latanoprost (XALATAN) 0 005 % ophthalmic solution   Yes No   Sig: Administer 1 drop to both eyes daily at bedtime   levothyroxine 50 mcg tablet   Yes No   Sig: Take 50 mcg by mouth daily     losartan (COZAAR) 25 mg tablet   No No   Sig: Take 1 tablet (25 mg total) by mouth daily Take on NON-Dialysis Days   magnesium oxide (MAG-OX) 400 mg   Yes No   Sig: Take 1 tablet by mouth 2 (two) times a day   methazolamide (NEPTAZANE) 25 MG tablet   Yes No   Sig: Take 25 mg by mouth 3 (three) times a day     omeprazole (PriLOSEC) 40 MG capsule   Yes No   Sig: Take 40 mg by mouth daily   prednisoLONE acetate (PRED FORTE) 1 % ophthalmic suspension   Yes No   Sig: Administer 1 drop to both eyes 4 (four) times a day     sertraline (ZOLOFT) 50 mg tablet   Yes No   Sig: Take 50 mg by mouth daily   torsemide (DEMADEX) 20 mg tablet   No No   Sig: Take 10 tablets (200 mg total) by mouth 2 (two) times a day   warfarin (COUMADIN) 5 mg tablet   Yes No   Sig: Take 12 5 tablets by mouth daily 12 5 M- 13 Sat and        Facility-Administered Medications: None       Past Medical History:   Diagnosis Date    Abnormal uterine bleeding (AUB)     [facility] [address] [facility]     Disease of thyroid gland     DVT (deep venous thrombosis) (HCC)     End stage kidney disease ([facility] [address] [facility]     Foot ulcer due to secondary DM ([facility] [address] [facility]     Hypertension     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[facility] [address] [facility]     right eye    Morbid obesity ([facility] [address] [facility]     Reflux esophagitis     Thrombophlebitis of saphenous vein     Warfarin anticoagulation        Past Surgical History:   Procedure Laterality Date    ARTERIOVENOUS GRAFT PLACEMENT      CERVICAL BIOPSY  W/ LOOP ELECTRODE EXCISION       SECTION      DIALYSIS FISTULA CREATION      DILATION AND CURETTAGE OF UTERUS      ENDOMETRIAL ABLATION W/ NOVASURE      PERICARDIUM SURGERY      DC LAPAROSCOPY W TOT HYSTERECT UTERUS 250 GRAM OR LESS N/A 2016    Procedure: HYSTERECTOMY LAPAROSCOPIC TOTAL ([address]), with bilateral salpingectomy;  Surgeon: Alessia Bae DO;  Location: [facility];  Service: Gynecology  THROMBECTOMY / ARTERIOVENOUS GRAFT REVISION      TOE SURGERY Right     removal of the 4th toe       History reviewed  No pertinent family history  I have reviewed and agree with the history as documented  Social History   Substance Use Topics    Smoking status: Never Smoker    Smokeless tobacco: Never Used    Alcohol use No        Review of Systems   Constitutional: Negative for chills and fever  HENT: Positive for nosebleeds and postnasal drip  Negative for rhinorrhea and sore throat  Eyes: Negative for photophobia and visual disturbance  Respiratory: Negative for cough and shortness of breath  Cardiovascular: Negative for chest pain and leg swelling  Gastrointestinal: Negative for abdominal pain, diarrhea, nausea and vomiting  Genitourinary: Negative for dysuria, frequency and hematuria  Musculoskeletal: Negative for back pain and neck pain  Skin: Positive for wound  Negative for rash  Neurological: Positive for dizziness  Negative for syncope, light-headedness and headaches  Hematological: Bruises/bleeds easily  Physical Exam  ED Triage Vitals   Temperature Pulse Respirations Blood Pressure SpO2   07/30/18 1902 07/30/18 1902 07/30/18 1902 07/30/18 1902 07/30/18 1902   98 2 °F (36 8 °C) 89 20 (!) 214/91 98 %      Temp Source Heart Rate Source Patient Position - Orthostatic VS BP Location FiO2 (%)   07/30/18 1902 07/30/18 1902 07/30/18 1902 07/30/18 1902 --   Tympanic Monitor Sitting Right arm       Pain Score       07/30/18 2200       2           Orthostatic Vital Signs  Vitals:    07/30/18 1902 07/30/18 2200   BP: (!) 214/91 (!) 177/80   Pulse: 89 85   Patient Position - Orthostatic VS: Sitting Sitting       Physical Exam   Constitutional: She is oriented to person, place, and time  She appears well-developed and well-nourished  No distress     HENT:   Dried blood in right nares and slow ooze from vessel in anterior plexus, no septal hematoma, no trauma, no oropharyngeal blood   Eyes: Conjunctivae are normal  Pupils are equal, round, and reactive to light  No scleral icterus  Neck: Neck supple  No JVD present  Cardiovascular: Normal rate, regular rhythm and normal heart sounds  Exam reveals no gallop and no friction rub  No murmur heard  Pulmonary/Chest: Effort normal and breath sounds normal  No respiratory distress  She has no wheezes  She has no rales  Abdominal: Soft  She exhibits no distension  There is no tenderness  There is no rebound and no guarding  2-3 cm area of fluctuance on the abdominal wall in the epigastric region, no drainage or significant surrounding erythema   Musculoskeletal: She exhibits no edema or tenderness  Neurological: She is alert and oriented to person, place, and time  Skin: Skin is warm and dry  She is not diaphoretic  Psychiatric: She has a normal mood and affect  Her behavior is normal  Thought content normal    Vitals reviewed        ED Medications  Medications   silver nitrate-potassium nitrate (ARZOL SILVER NITRATE) 66-83 % applicator 1 applicator (1 applicator Topical Given 7/30/18 2049)   lidocaine-epinephrine (XYLOCAINE/EPINEPHRINE) 1 %-1:100,000 injection 5 mL (5 mL Infiltration Given 7/30/18 2120)       Diagnostic Studies  Results Reviewed     Procedure Component Value Units Date/Time    Protime-INR [[id number]]  (Abnormal) Collected:  07/30/18 1950    Lab Status:  Final result Specimen:  Blood from Arm, Right Updated:  07/30/18 2023     Protime 34 8 (H) seconds      INR 3 46 (H)    POCT Chem 8+ [[id number]]  (Abnormal) Collected:  07/30/18 1955    Lab Status:  Final result Updated:  07/30/18 2000     SODIUM, I-STAT 138 mmol/l      Potassium, i-STAT 4 9 mmol/L      Chloride, istat 99 (L) mmol/L      CO2, i-STAT 27 mmol/L      Anion Gap, Istat 19 (H) mmol/L      Calcium, Ionized i-STAT 1 05 (L) mmol/L      BUN, I-STAT 64 (H) mg/dl      Creatinine, i-STAT 8 5 (H) mg/dl      eGFR 5 ml/min/1 73sq m      Glucose, i-STAT 196 (H) mg/dl      Hct, i-STAT 24 (L) %      Hgb, i-STAT 8 2 (L) g/dl      Specimen Type VENOUS                 No orders to display         Procedures  Incision/Drainage  Date/Time: 7/30/2018 8:30 PM  Performed by: Ramon Jose by: Cass Ugarte     Patient location:  ED  Other Assisting Provider: No    Consent:     Consent obtained:  Verbal    Consent given by:  Patient    Risks discussed:  Bleeding, incomplete drainage, pain, infection and damage to other organs    Alternatives discussed:  No treatment, delayed treatment and alternative treatment  Universal protocol:     Procedure explained and questions answered to patient or proxy's satisfaction: yes      Radiology Images displayed and confirmed  If images not available, report reviewed: yes      Required blood products, implants, devices, and special equipment available: yes      Site/side marked: yes      Immediately prior to procedure a time out was called: yes      Patient identity confirmed:  Verbally with patient and arm band  Location:     Type:  Abscess    Size:  2-3 cm    Location:  Trunk    Trunk location:  Abdomen  Pre-procedure details:     Skin preparation:  Betadine  Anesthesia (see MAR for exact dosages): Anesthesia method:  Local infiltration    Local anesthetic:  Lidocaine 1% WITH epi  Procedure details:     Complexity:  Simple    Needle aspiration: no      Incision types:  Single straight    Scalpel blade:  11    Approach:  Open    Incision depth:  Skin    Wound management:  Probed and deloculated, irrigated with saline and extensive cleaning    Drainage:  Purulent and bloody    Drainage amount: Moderate    Wound treatment:  Wound left open    Packing materials:  None  Post-procedure details:     Patient tolerance of procedure:   Tolerated well, no immediate complications          Phone Consults  ED Phone Contact    ED Course                               MDM  Number of Diagnoses or Management Options  Abscess: Epistaxis:   Hypertension:   Supratherapeutic INR:   Diagnosis management comments: Epistaxis stopped with direct pressure follow by chemical cautery  Hgb >8 improved from baseline  INR 3 4  SBP in 170s on recheck with previous visits showing 150s-160s SBP  Abscess was irrigated and drained  Patient was instructed to hold dose of coumadin tomorrow  She was given the first dose of antibiotics and a 1-week course  She was instructed to follow up with her PCP in 2 days after next dialysis session to readdress epistaxis, INR recheck, and BP management  CritCare Time    Disposition  Final diagnoses:   Epistaxis   Supratherapeutic INR   Abscess   Hypertension     Time reflects when diagnosis was documented in both MDM as applicable and the Disposition within this note     Time User Action Codes Description Comment    7/30/2018 10:12 PM Meera Cid Carrow Add [R04 0] Epistaxis     7/30/2018 10:12 PM Anat Cidyrene Carrow Add [R79 1] Supratherapeutic INR     7/30/2018 10:12 PM Meera Cid Carrow Add [L02 91] Abscess     7/30/2018 10:12 PM AmadouerreAnat paulayrene Carrow Add [I10] Hypertension       ED Disposition     ED Disposition Condition Comment    Discharge  Kathya Aldrich discharge to home/self care      Condition at discharge: Good        Follow-up Information     Follow up With Specialties Details Why Contact Info Additional Information    Radha Mendoza MD Internal Medicine In 2 days For INR check, wound check, and further management [address] Emergency Department Emergency Medicine  As needed [address]  [phone]  ED, [address] [place] [address], [facility]          Discharge Medication List as of 7/30/2018 10:16 PM      START taking these medications    Details   cephalexin (KEFLEX) 500 mg capsule Take 1 capsule (500 mg total) by mouth every 6 (six) hours for 7 days, Starting Mon 7/30/2018, Until Mon 8/6/2018, Print      sulfamethoxazole-trimethoprim (BACTRIM DS) 800-160 mg per tablet Take 1 tablet by mouth 2 (two) times a day for 7 days smx-tmp DS (BACTRIM) 800-160 mg tabs (1tab q12 D10), Starting Mon 7/30/2018, Until Mon 8/6/2018, Print         CONTINUE these medications which have NOT CHANGED    Details   acetaminophen (TYLENOL) 325 mg tablet Take 3 tablets (975 mg total) by mouth 3 (three) times a day Take for headache symptoms, Starting Wed 5/23/2018, No Print      ALPRAZolam (XANAX) 0 25 mg tablet Take 0 125 mg by mouth every 4 (four) hours as needed for anxiety Take on Tues, Friday and Saturday , Historical Med      atorvastatin (LIPITOR) 20 mg tablet Starting Tue 4/24/2018, Historical Med      atropine 1 % ophthalmic ointment Administer 1 application to the right eye 3 (three) times a day, Historical Med      B Complex-C-Folic Acid (TRIPHROCAPS PO) Take 1 capsule by mouth daily, Starting Wed 1/13/2016, Historical Med      brimonidine (ALPHAGAN P) 0 15 % ophthalmic solution Administer 1 drop to both eyes 3 (three) times a day  , Until Discontinued, Historical Med      CALCIUM CARBONATE PO Take 1,000 mg by mouth daily  , Until Discontinued, Historical Med      carvedilol (COREG) 25 mg tablet Take 25 mg by mouth 2 (two) times a day with meals  , Historical Med      cholecalciferol (VITAMIN D3) 1,000 units tablet Take 2,000 Units by mouth daily, Historical Med      cinacalcet (SENSIPAR) 30 mg tablet Take 30 mg by mouth daily, Historical Med      cloNIDine (CATAPRES) 0 1 mg tablet Take 0 1 mg by mouth 2 (two) times a day, Until Discontinued, Historical Med      cyclobenzaprine (FLEXERIL) 10 mg tablet Take 1 tablet (10 mg total) by mouth daily at bedtime for 5 days Take for headache, Starting Wed 5/23/2018, Until Mon 5/28/2018, Print      diphenhydrAMINE (BENADRYL) 25 mg capsule Take by mouth, Historical Med      Ferric Citrate (AURYXIA) 1  MG(Fe) TABS Take by mouth, Starting Wed 3/29/2017, Historical Med      gabapentin (NEURONTIN) 100 mg capsule Take 100 mg by mouth 3 (three) times a day  , Until Discontinued, Historical Med      insulin aspart (NovoLOG) 100 units/mL injection Inject 1-5 Units under the skin 3 (three) times a day before meals As per sliding scale , Until Discontinued, Historical Med      insulin glargine (LANTUS) 100 units/mL subcutaneous injection Inject 24 Units under the skin daily at bedtime, Starting 2/19/2017, Until Discontinued, No Print      latanoprost (XALATAN) 0 005 % ophthalmic solution Administer 1 drop to both eyes daily at bedtime, Until Discontinued, Historical Med      levothyroxine 50 mcg tablet Take 50 mcg by mouth daily  , Until Discontinued, Historical Med      losartan (COZAAR) 25 mg tablet Take 1 tablet (25 mg total) by mouth daily Take on NON-Dialysis Days, Starting Wed 5/23/2018, No Print      magnesium oxide (MAG-OX) 400 mg Take 1 tablet by mouth 2 (two) times a day, Starting Tue 8/26/2014, Historical Med      Melatonin 3 MG CAPS Take 10 mg by mouth daily at bedtime  , Historical Med      methazolamide (NEPTAZANE) 25 MG tablet Take 25 mg by mouth 3 (three) times a day  , Historical Med      omeprazole (PriLOSEC) 40 MG capsule Take 40 mg by mouth daily, Historical Med      PARoxetine (PAXIL) 10 mg tablet Take 20 mg by mouth every morning Except Tues, Friday and Saturday , Historical Med      prednisoLONE acetate (PRED FORTE) 1 % ophthalmic suspension Administer 1 drop to both eyes 4 (four) times a day  , Until Discontinued, Historical Med      sertraline (ZOLOFT) 50 mg tablet Take 50 mg by mouth daily, Historical Med      torsemide (DEMADEX) 20 mg tablet Take 10 tablets (200 mg total) by mouth 2 (two) times a day, Starting Wed 5/23/2018, No Print      VIGAMOX 0 5 % ophthalmic solution Administer 1 drop to the right eye, Starting Fri 2/23/2018, Historical Med      warfarin (COUMADIN) 5 mg tablet Take 12 5 tablets by mouth daily 12 5 M-F 13 Sat and Sunday , Starting Fri 10/30/2015, Historical Med           No discharge procedures on file  ED Provider  Attending physically available and evaluated Ninette Hodgkin I managed the patient along with the ED Attending      Electronically Signed by         Norleen Crigler, MD  07/31/18 4771

## 2018-07-31 NOTE — ED NOTES
Pt not in room at time of attempted medication administration  Dr Biju Tate made aware        Dorothy Powell, RN  07/30/18 4920

## 2018-07-31 NOTE — ED RE-EVALUATION NOTE
Pt left prior to repeat BP and administration of first dose of abx by nursing        Angel Kolb MD  08/03/18 0502

## 2018-07-31 NOTE — DISCHARGE INSTRUCTIONS
Please keep your wound clean and dry  Hold your coumadin tomorrow  If your nose bleeds hold direct pressure for 10 minutes without checking  Do this twice and come to the ER if it does not stop  Follow up with your primary doctor in 2 days for wound check and INR check  Return to the ER for new or worrisome symptoms including development of fevers (temperature 100 4 or greater)  Abscess   WHAT YOU NEED TO KNOW:   A warm compress may help your abscess drain  Your healthcare provider may make a cut in the abscess so it can drain  You may need surgery to remove an abscess that is on your hands or buttocks  DISCHARGE INSTRUCTIONS:   Return to the emergency department if:   · The area around your abscess becomes very painful, warm, or has red streaks  · You have a fever and chills  · Your heart is beating faster than usual      · You feel faint or confused  Contact your healthcare provider if:   · Your abscess gets bigger or does not get better  · Your abscess returns  · You have questions or concerns about your condition or care  Medicines: You may  need any of the following:  · Antibiotics  help treat a bacterial infection  · Acetaminophen  decreases pain and fever  It is available without a doctor's order  Ask how much to take and how often to take it  Follow directions  Acetaminophen can cause liver damage if not taken correctly  · NSAIDs , such as ibuprofen, help decrease swelling, pain, and fever  This medicine is available with or without a doctor's order  NSAIDs can cause stomach bleeding or kidney problems in certain people  If you take blood thinner medicine, always ask your healthcare provider if NSAIDs are safe for you  Always read the medicine label and follow directions  · Take your medicine as directed  Contact your healthcare provider if you think your medicine is not helping or if you have side effects  Tell him or her if you are allergic to any medicine   Keep a list of the medicines, vitamins, and herbs you take  Include the amounts, and when and why you take them  Bring the list or the pill bottles to follow-up visits  Carry your medicine list with you in case of an emergency  Self-care:   · Apply a warm compress to your abscess  This will help it open and drain  Wet a washcloth in warm, but not hot, water  Apply the compress for 10 minutes  Repeat this 4 times each day  Do not  press on an abscess or try to open it with a needle  You may push the bacteria deeper or into your blood  · Do not share your clothes, towels, or sheets with anyone  This can spread the infection to others  · Wash your hands often  This can help prevent the spread of germs  Use soap and water or an alcohol-based hand rub  Care for your wound after it is drained:   · Care for your wound as directed  If your healthcare provider says it is okay, carefully remove the bandage and gauze packing  You may need to soak the gauze to get it out of your wound  Clean your wound and the area around it as directed  Dry the area and put on new, clean bandages  Change your bandages when they get wet or dirty  · Ask your healthcare provider how to change the gauze in your wound  Keep track of how many pieces of gauze are placed inside the wound  Do not put too much packing in the wound  Do not pack the gauze too tightly in your wound  Follow up with your healthcare provider in 1 to 3 days: You may need to have your packing removed or your bandage changed  Write down your questions so you remember to ask them during your visits  © 2017 [address] Information is for End User's use only and may not be sold, redistributed or otherwise used for commercial purposes  All illustrations and images included in CareNotes® are the copyrighted property of "[employer]  or [employer]  The above information is an  only   It is not intended as medical advice for individual conditions or treatments  Talk to your doctor, nurse or pharmacist before following any medical regimen to see if it is safe and effective for you  Epistaxis   WHAT YOU SHOULD KNOW:   Epistaxis is a nosebleed  A nosebleed occurs when the blood vessels near the surface of the nasal cavity are injured or damaged  AFTER YOU LEAVE:   Medicines:   · Nasal sprays:  Vasoconstrictor nasal spray is a medicine that helps make nasal blood vessels narrower  This limits the blood flow and stops the bleeding  This medicine also decreases the swelling inside your nose and helps you breathe easier  You may also be directed to use saline or other nasal sprays to add moisture to your nose  · Antibiotics: This medicine is given to help treat or prevent an infection caused by bacteria  · Take your medicine as directed  Call your healthcare provider if you think your medicine is not helping or if you have side effects  Tell him if you are allergic to any medicine  Keep a list of the medicines, vitamins, and herbs you take  Include the amounts, and when and why you take them  Bring the list or the pill bottles to follow-up visits  Carry your medicine list with you in case of an emergency  Follow up with your primary healthcare provider or otolaryngologist within 2 to 3 days or as directed: Any packing in your nose should be removed within 2 to 3 days  Write down your questions so you remember to ask them during your visits  First aid:   · Sit up and lean forward: This will help prevent you from swallowing blood  Spit blood and saliva into a bowl  · Apply pressure to your nose:  Use 2 fingers to pinch your nose shut for 10 minutes  This will help stop the bleeding  Breathe through your mouth  · Apply ice:  Use a cold pack or put crushed ice in a bag, cover with a towel, and place on the bridge of your nose       · Nasal packing:  Pack your nose with a cotton ball, tissue, tampon, or gauze bandage to stop the bleeding  Prevent epistaxis:  · Avoid nose picking and blowing your nose too hard: You can irritate or damage your nose if you pick it  Blowing your nose too hard may cause the bleeding to start again  · Avoid irritants:  Substances that can irritate your nose should be avoided  These include tobacco smoke and chemical sprays such as  that contain ammonia  · Use a cool mist humidifier in your home: This will add the moisture to the air and help keep your nose moist      · Put a small amount of petroleum jelly inside your nostrils: You may apply a small amount of petroleum jelly if you do not have a nasal packing  This will help keep your nose from drying out or getting irritated  Do not put anything else inside your nose unless your primary healthcare provider tells you to do so  Contact your primary healthcare provider or otolaryngologist if:   · You have a fever and are vomiting  · You have pain in and around your nose that is getting worse even after you take pain medicines  · Your nasal pack is loose  · You have questions or concerns about your condition or care  Seek care immediately if:   · Your nasal packing is soaked with blood  · Your nose is still bleeding after 20 minutes, even after you pinch it  · You have a foul-smelling discharge coming out of your nose  · You feel so weak and dizzy that you have trouble standing up  · You have trouble breathing or talking  © 2014 [address] is for End User's use only and may not be sold, redistributed or otherwise used for commercial purposes  All illustrations and images included in CareNotes® are the copyrighted property of [employer]  or [address]  The above information is an  only  It is not intended as medical advice for individual conditions or treatments   Talk to your doctor, nurse or pharmacist before following any medical regimen to see if it is safe and effective for you  Cephalexin (By mouth)   Cephalexin (sgy-t-QNM-in)  Treats infections  This medicine is a cephalosporin antibiotic  Brand Name(s): Daxbia, Keflex   There may be other brand names for this medicine  When This Medicine Should Not Be Used: This medicine is not right for everyone  Do not use this medicine if you had an allergic reaction to cephalexin or another cephalosporin medicine  How to Use This Medicine:   Capsule, Tablet, Tablet for Suspension, Liquid  · Your doctor will tell you how much medicine to use  Do not use more than directed  · Read and follow the patient instructions that come with this medicine  Talk to your doctor or pharmacist if you have any questions  · You may take your medicine with food or milk to avoid stomach upset  · Oral liquid: Shake well just before use  Measure the oral liquid medicine with a marked measuring spoon, oral syringe, or medicine cup  · Take all of the medicine in your prescription to clear up your infection, even if you feel better after the first few doses  · Missed dose: Take a dose as soon as you remember  If it is almost time for your next dose, wait until then and take a regular dose  Do not take extra medicine to make up for a missed dose  · Capsule or tablet: Store at room temperature away from heat, moisture, and direct light  · Oral liquid: Store in the refrigerator for 14 days  After 14 days, throw away any unused medicine  Do not freeze  Drugs and Foods to Avoid:   Ask your doctor or pharmacist before using any other medicine, including over-the-counter medicines, vitamins, and herbal products  · Some medicines and foods can affect how cephalexin works   Tell your doctor if you are also using  ¨ Metformin  ¨ Probenecid  Warnings While Using This Medicine:   · Tell your doctor if you are pregnant or breastfeeding, or if you have kidney disease, liver disease, or a history of digestive problems, such as colitis  Tell your doctor if you are allergic to penicillin  · This medicine can cause diarrhea  Call your doctor if the diarrhea becomes severe, does not stop, or is bloody  Do not take any medicine to stop diarrhea until you have talked to your doctor  Diarrhea can occur 2 months or more after you stop taking this medicine  · Tell any doctor or dentist who treats you that you are using this medicine  This medicine may affect certain medical test results  · Call your doctor if your symptoms do not improve or if they get worse  · Keep all medicine out of the reach of children  Never share your medicine with anyone  Possible Side Effects While Using This Medicine:   Call your doctor right away if you notice any of these side effects:  · Allergic reaction: Itching or hives, swelling in your face or hands, swelling or tingling in your mouth or throat, chest tightness, trouble breathing  · Blistering, peeling, red skin rash  · Severe diarrhea, especially if bloody or ongoing  · Severe stomach pain, vomiting  If you notice these less serious side effects, talk with your doctor:   · Mild diarrhea or nausea  If you notice other side effects that you think are caused by this medicine, tell your doctor  Call your doctor for medical advice about side effects  You may report side effects to FDA at 7-148-FDA-1887  © 2017 [address]  Information is for End User's use only and may not be sold, redistributed or otherwise used for commercial purposes  The above information is an  only  It is not intended as medical advice for individual conditions or treatments  Talk to your doctor, nurse or pharmacist before following any medical regimen to see if it is safe and effective for you  Sulfamethoxazole/Trimethoprim (By mouth)   Sulfamethoxazole (sul-fa-meth-OX-a-zole), Trimethoprim (trye-METH-oh-prim)  Treats or prevents infections  Brand Name(s):  Bactrim, Bactrim DS, SMZ-TMP Pediatric, Sulfatrim, Sulfatrim Pediatric   There may be other brand names for this medicine  When This Medicine Should Not Be Used: This medicine is not right for everyone  Do not use it if you had an allergic reaction to trimethoprim, sulfamethoxazole, or any sulfa drug  Do not use this medicine if you are pregnant, if you have anemia caused by low levels of folic acid, or if you have a history of drug-induced thrombocytopenia  How to Use This Medicine:   Liquid, Tablet  · Your doctor will tell you how much medicine to use  Do not use more than directed  · Measure the oral liquid medicine with a marked measuring spoon, oral syringe, or medicine cup  · Drink extra fluids so you will urinate more often and help prevent kidney problems  · Take all of the medicine in your prescription to clear up your infection, even if you feel better after the first few doses  · Missed dose: Take a dose as soon as you remember  If it is almost time for your next dose, wait until then and take a regular dose  Do not take extra medicine to make up for a missed dose  · Store the medicine in a closed container at room temperature, away from heat, moisture, and direct light  Do not freeze the oral liquid  Drugs and Foods to Avoid:   Ask your doctor or pharmacist before using any other medicine, including over-the-counter medicines, vitamins, and herbal products  · Some medicines can affect how this medicine works  Tell your doctor if you also use the following:   ¨ amantadine, cyclosporine, digoxin, indomethacin, memantine, methotrexate, phenytoin, pyrimethamine, or warfarin  ¨ an ACE inhibitor, diabetes medicine (glipizide, glyburide, metformin, pioglitazone, repaglinide, rosiglitazone), a diuretic (water pill, such as hydrochlorothiazide), or a tricyclic antidepressant  Warnings While Using This Medicine:   · It is not safe to take this medicine during pregnancy  It could harm an unborn baby   Tell your doctor right away if you become pregnant  · Tell your doctor if you are breastfeeding, or if you have kidney disease, liver disease, diabetes, malabsorption or malnutrition, folate deficiency, porphyria, thyroid problems, or a history of alcoholism  Tell your doctor if you have asthma or severe allergies, especially if you are allergic to any medicines  It is important for your doctor to know if you have HIV or AIDS, because this medicine might work differently for you  · This medicine may cause a severe allergic reaction  · This medicine may lower the number of platelets in your body, which are necessary for proper blood clotting  This may cause you to bleed or get infections more easily  Talk with your doctor if you have concerns about this  · This medicine can cause diarrhea  Call your doctor if the diarrhea becomes severe, does not stop, or is bloody  Do not take any medicine to stop diarrhea until you have talked to your doctor  Diarrhea can occur 2 months or more after you stop taking this medicine  · Tell any doctor or dentist who treats you that you are using this medicine  This medicine may affect certain medical test results  · Your doctor will do lab tests at regular visits to check on the effects of this medicine  Keep all appointments  · Keep all medicine out of the reach of children  Never share your medicine with anyone    Possible Side Effects While Using This Medicine:   Call your doctor right away if you notice any of these side effects:  · Allergic reaction: Itching or hives, swelling in your face or hands, swelling or tingling in your mouth or throat, chest tightness, trouble breathing  · Blistering, peeling, or red skin rash  · Dark urine or pale stools, nausea, vomiting, loss of appetite, stomach pain, yellow skin or eyes  · Chest pain, cough, or trouble breathing  · Confusion, weakness  · Muscle twitching  · Severe diarrhea, stomach pain, cramps, bloating  · Skin rash, purple spots on your skin, or very pale or yellow skin  · Sore throat, fever, muscle pain  · Uneven heartbeat, numbness or tingling in your hands, feet, or lips  · Unusual bleeding, bruising, or weakness  If you notice these less serious side effects, talk with your doctor:   · Mild nausea, vomiting, or loss of appetite  If you notice other side effects that you think are caused by this medicine, tell your doctor  Call your doctor for medical advice about side effects  You may report side effects to FDA at 9-352-FDA-1435  © 2017 [address] Information is for End User's use only and may not be sold, redistributed or otherwise used for commercial purposes  The above information is an  only  It is not intended as medical advice for individual conditions or treatments  Talk to your doctor, nurse or pharmacist before following any medical regimen to see if it is safe and effective for you

## 2018-08-02 ENCOUNTER — TRANSCRIBE ORDERS (OUTPATIENT)
Dept: LAB | Facility: CLINIC | Age: 53
End: 2018-08-02

## 2018-08-02 ENCOUNTER — APPOINTMENT (OUTPATIENT)
Dept: LAB | Facility: CLINIC | Age: 53
End: 2018-08-02
Payer: MEDICARE

## 2018-08-02 DIAGNOSIS — Z79.01 LONG TERM (CURRENT) USE OF ANTICOAGULANTS: Primary | ICD-10-CM

## 2018-08-02 DIAGNOSIS — Z79.01 LONG TERM (CURRENT) USE OF ANTICOAGULANTS: ICD-10-CM

## 2018-08-02 LAB
INR PPP: 1.22 (ref 0.86–1.17)
PROTHROMBIN TIME: 15.5 SECONDS (ref 11.8–14.2)

## 2018-08-02 PROCEDURE — 36415 COLL VENOUS BLD VENIPUNCTURE: CPT

## 2018-08-02 PROCEDURE — 85610 PROTHROMBIN TIME: CPT

## 2018-08-15 ENCOUNTER — APPOINTMENT (OUTPATIENT)
Dept: LAB | Facility: CLINIC | Age: 53
End: 2018-08-15
Payer: MEDICARE

## 2018-08-15 LAB
INR PPP: 1.97 (ref 0.86–1.17)
PROTHROMBIN TIME: 22.5 SECONDS (ref 11.8–14.2)

## 2018-08-15 PROCEDURE — 36415 COLL VENOUS BLD VENIPUNCTURE: CPT

## 2018-08-15 PROCEDURE — 85610 PROTHROMBIN TIME: CPT

## 2018-09-19 ENCOUNTER — TELEPHONE (OUTPATIENT)
Dept: RADIOLOGY | Facility: HOSPITAL | Age: 53
End: 2018-09-19

## 2018-09-26 ENCOUNTER — APPOINTMENT (OUTPATIENT)
Dept: LAB | Facility: CLINIC | Age: 53
End: 2018-09-26
Payer: MEDICARE

## 2018-10-09 ENCOUNTER — APPOINTMENT (OUTPATIENT)
Dept: LAB | Facility: CLINIC | Age: 53
End: 2018-10-09
Payer: MEDICARE

## 2018-10-09 DIAGNOSIS — Z79.01 LONG TERM (CURRENT) USE OF ANTICOAGULANTS: ICD-10-CM

## 2018-10-09 LAB
INR PPP: 1.29 (ref 0.86–1.17)
PROTHROMBIN TIME: 16.2 SECONDS (ref 11.8–14.2)

## 2018-10-09 PROCEDURE — 85610 PROTHROMBIN TIME: CPT

## 2018-10-09 PROCEDURE — 36415 COLL VENOUS BLD VENIPUNCTURE: CPT

## 2018-10-22 ENCOUNTER — APPOINTMENT (EMERGENCY)
Dept: RADIOLOGY | Facility: HOSPITAL | Age: 53
End: 2018-10-22
Payer: MEDICARE

## 2018-10-22 ENCOUNTER — HOSPITAL ENCOUNTER (OUTPATIENT)
Facility: HOSPITAL | Age: 53
Setting detail: OBSERVATION
Discharge: HOME | End: 2018-10-23
Attending: EMERGENCY MEDICINE | Admitting: INTERNAL MEDICINE
Payer: MEDICARE

## 2018-10-22 DIAGNOSIS — M25.461 EFFUSION OF RIGHT KNEE: ICD-10-CM

## 2018-10-22 DIAGNOSIS — R26.2 AMBULATORY DYSFUNCTION: Primary | ICD-10-CM

## 2018-10-22 DIAGNOSIS — M25.569 KNEE PAIN: ICD-10-CM

## 2018-10-22 DIAGNOSIS — N18.6 ESRD (END STAGE RENAL DISEASE) (HCC): ICD-10-CM

## 2018-10-22 LAB
ALBUMIN SERPL BCP-MCNC: 3.2 G/DL (ref 3.5–5)
ALP SERPL-CCNC: 127 U/L (ref 46–116)
ALT SERPL W P-5'-P-CCNC: 11 U/L (ref 12–78)
ANION GAP SERPL CALCULATED.3IONS-SCNC: 10 MMOL/L (ref 4–13)
APTT PPP: 27 SECONDS (ref 24–36)
AST SERPL W P-5'-P-CCNC: 9 U/L (ref 5–45)
ATRIAL RATE: 79 BPM
BASOPHILS # BLD AUTO: 0.03 THOUSANDS/ΜL (ref 0–0.1)
BASOPHILS NFR BLD AUTO: 0 % (ref 0–1)
BILIRUB SERPL-MCNC: 0.35 MG/DL (ref 0.2–1)
BUN SERPL-MCNC: 82 MG/DL (ref 5–25)
CALCIUM SERPL-MCNC: 8.6 MG/DL (ref 8.3–10.1)
CHLORIDE SERPL-SCNC: 98 MMOL/L (ref 100–108)
CO2 SERPL-SCNC: 25 MMOL/L (ref 21–32)
CREAT SERPL-MCNC: 8.61 MG/DL (ref 0.6–1.3)
EOSINOPHIL # BLD AUTO: 0.15 THOUSAND/ΜL (ref 0–0.61)
EOSINOPHIL NFR BLD AUTO: 2 % (ref 0–6)
ERYTHROCYTE [DISTWIDTH] IN BLOOD BY AUTOMATED COUNT: 12.4 % (ref 11.6–15.1)
GFR SERPL CREATININE-BSD FRML MDRD: 5 ML/MIN/1.73SQ M
GLUCOSE SERPL-MCNC: 197 MG/DL (ref 65–140)
GLUCOSE SERPL-MCNC: 243 MG/DL (ref 65–140)
GLUCOSE SERPL-MCNC: 252 MG/DL (ref 65–140)
HCT VFR BLD AUTO: 27.4 % (ref 34.8–46.1)
HGB BLD-MCNC: 9 G/DL (ref 11.5–15.4)
IMM GRANULOCYTES # BLD AUTO: 0.03 THOUSAND/UL (ref 0–0.2)
IMM GRANULOCYTES NFR BLD AUTO: 0 % (ref 0–2)
INR PPP: 1.22 (ref 0.86–1.17)
LYMPHOCYTES # BLD AUTO: 0.99 THOUSANDS/ΜL (ref 0.6–4.47)
LYMPHOCYTES NFR BLD AUTO: 13 % (ref 14–44)
MCH RBC QN AUTO: 31.6 PG (ref 26.8–34.3)
MCHC RBC AUTO-ENTMCNC: 32.8 G/DL (ref 31.4–37.4)
MCV RBC AUTO: 96 FL (ref 82–98)
MONOCYTES # BLD AUTO: 0.64 THOUSAND/ΜL (ref 0.17–1.22)
MONOCYTES NFR BLD AUTO: 8 % (ref 4–12)
NEUTROPHILS # BLD AUTO: 5.98 THOUSANDS/ΜL (ref 1.85–7.62)
NEUTS SEG NFR BLD AUTO: 77 % (ref 43–75)
NRBC BLD AUTO-RTO: 0 /100 WBCS
P AXIS: 26 DEGREES
PLATELET # BLD AUTO: 166 THOUSANDS/UL (ref 149–390)
PMV BLD AUTO: 10.7 FL (ref 8.9–12.7)
POTASSIUM SERPL-SCNC: 5.2 MMOL/L (ref 3.5–5.3)
PR INTERVAL: 172 MS
PROT SERPL-MCNC: 7.1 G/DL (ref 6.4–8.2)
PROTHROMBIN TIME: 15.5 SECONDS (ref 11.8–14.2)
QRS AXIS: 13 DEGREES
QRSD INTERVAL: 100 MS
QT INTERVAL: 376 MS
QTC INTERVAL: 431 MS
RBC # BLD AUTO: 2.85 MILLION/UL (ref 3.81–5.12)
SODIUM SERPL-SCNC: 133 MMOL/L (ref 136–145)
T WAVE AXIS: 63 DEGREES
VENTRICULAR RATE: 79 BPM
WBC # BLD AUTO: 7.82 THOUSAND/UL (ref 4.31–10.16)

## 2018-10-22 PROCEDURE — 85610 PROTHROMBIN TIME: CPT | Performed by: INTERNAL MEDICINE

## 2018-10-22 PROCEDURE — 99285 EMERGENCY DEPT VISIT HI MDM: CPT

## 2018-10-22 PROCEDURE — 82948 REAGENT STRIP/BLOOD GLUCOSE: CPT

## 2018-10-22 PROCEDURE — 96374 THER/PROPH/DIAG INJ IV PUSH: CPT

## 2018-10-22 PROCEDURE — 71046 X-RAY EXAM CHEST 2 VIEWS: CPT

## 2018-10-22 PROCEDURE — 73564 X-RAY EXAM KNEE 4 OR MORE: CPT

## 2018-10-22 PROCEDURE — 93010 ELECTROCARDIOGRAM REPORT: CPT | Performed by: INTERNAL MEDICINE

## 2018-10-22 PROCEDURE — 85025 COMPLETE CBC W/AUTO DIFF WBC: CPT | Performed by: EMERGENCY MEDICINE

## 2018-10-22 PROCEDURE — 74176 CT ABD & PELVIS W/O CONTRAST: CPT

## 2018-10-22 PROCEDURE — 36415 COLL VENOUS BLD VENIPUNCTURE: CPT | Performed by: EMERGENCY MEDICINE

## 2018-10-22 PROCEDURE — 99220 PR INITIAL OBSERVATION CARE/DAY 70 MINUTES: CPT | Performed by: INTERNAL MEDICINE

## 2018-10-22 PROCEDURE — 93005 ELECTROCARDIOGRAM TRACING: CPT

## 2018-10-22 PROCEDURE — 85730 THROMBOPLASTIN TIME PARTIAL: CPT | Performed by: EMERGENCY MEDICINE

## 2018-10-22 PROCEDURE — 72125 CT NECK SPINE W/O DYE: CPT

## 2018-10-22 PROCEDURE — 80053 COMPREHEN METABOLIC PANEL: CPT | Performed by: EMERGENCY MEDICINE

## 2018-10-22 PROCEDURE — 70450 CT HEAD/BRAIN W/O DYE: CPT

## 2018-10-22 PROCEDURE — 85610 PROTHROMBIN TIME: CPT | Performed by: EMERGENCY MEDICINE

## 2018-10-22 RX ORDER — OXYCODONE HYDROCHLORIDE AND ACETAMINOPHEN 5; 325 MG/1; MG/1
2 TABLET ORAL ONCE
Status: COMPLETED | OUTPATIENT
Start: 2018-10-22 | End: 2018-10-22

## 2018-10-22 RX ORDER — WARFARIN SODIUM 5 MG/1
5 TABLET ORAL
Status: DISCONTINUED | OUTPATIENT
Start: 2018-10-22 | End: 2018-10-22

## 2018-10-22 RX ORDER — CINACALCET 30 MG/1
30 TABLET, FILM COATED ORAL DAILY
Status: DISCONTINUED | OUTPATIENT
Start: 2018-10-23 | End: 2018-10-23 | Stop reason: HOSPADM

## 2018-10-22 RX ORDER — ACETAMINOPHEN 325 MG/1
975 TABLET ORAL EVERY 8 HOURS SCHEDULED
Status: DISCONTINUED | OUTPATIENT
Start: 2018-10-22 | End: 2018-10-23 | Stop reason: HOSPADM

## 2018-10-22 RX ORDER — ATORVASTATIN CALCIUM 20 MG/1
20 TABLET, FILM COATED ORAL
Status: DISCONTINUED | OUTPATIENT
Start: 2018-10-22 | End: 2018-10-23 | Stop reason: HOSPADM

## 2018-10-22 RX ORDER — POLYETHYLENE GLYCOL 3350 17 G/17G
17 POWDER, FOR SOLUTION ORAL DAILY
Status: DISCONTINUED | OUTPATIENT
Start: 2018-10-23 | End: 2018-10-23 | Stop reason: HOSPADM

## 2018-10-22 RX ORDER — MELATONIN
2000 DAILY
Status: DISCONTINUED | OUTPATIENT
Start: 2018-10-23 | End: 2018-10-23 | Stop reason: HOSPADM

## 2018-10-22 RX ORDER — HYDROMORPHONE HCL/PF 1 MG/ML
0.2 SYRINGE (ML) INJECTION ONCE
Status: COMPLETED | OUTPATIENT
Start: 2018-10-22 | End: 2018-10-22

## 2018-10-22 RX ORDER — PREDNISOLONE ACETATE 10 MG/ML
1 SUSPENSION/ DROPS OPHTHALMIC 4 TIMES DAILY
Status: DISCONTINUED | OUTPATIENT
Start: 2018-10-22 | End: 2018-10-23 | Stop reason: HOSPADM

## 2018-10-22 RX ORDER — PAROXETINE HYDROCHLORIDE 20 MG/1
20 TABLET, FILM COATED ORAL
Status: DISCONTINUED | OUTPATIENT
Start: 2018-10-24 | End: 2018-10-23 | Stop reason: HOSPADM

## 2018-10-22 RX ORDER — CHOLECALCIFEROL (VITAMIN D3) 10 MCG
1 TABLET ORAL DAILY
Status: DISCONTINUED | OUTPATIENT
Start: 2018-10-23 | End: 2018-10-23 | Stop reason: HOSPADM

## 2018-10-22 RX ORDER — ATROPINE SULFATE 10 MG/ML
1 SOLUTION/ DROPS OPHTHALMIC 3 TIMES DAILY
Status: DISCONTINUED | OUTPATIENT
Start: 2018-10-22 | End: 2018-10-23 | Stop reason: HOSPADM

## 2018-10-22 RX ORDER — LATANOPROST 50 UG/ML
1 SOLUTION/ DROPS OPHTHALMIC
Status: DISCONTINUED | OUTPATIENT
Start: 2018-10-22 | End: 2018-10-23 | Stop reason: HOSPADM

## 2018-10-22 RX ORDER — ONDANSETRON 2 MG/ML
4 INJECTION INTRAMUSCULAR; INTRAVENOUS EVERY 6 HOURS PRN
Status: DISCONTINUED | OUTPATIENT
Start: 2018-10-22 | End: 2018-10-23 | Stop reason: HOSPADM

## 2018-10-22 RX ORDER — PAROXETINE HYDROCHLORIDE 20 MG/1
20 TABLET, FILM COATED ORAL EVERY MORNING
Status: DISCONTINUED | OUTPATIENT
Start: 2018-10-23 | End: 2018-10-22

## 2018-10-22 RX ORDER — ACETAZOLAMIDE 125 MG/1
125 TABLET ORAL 3 TIMES DAILY
Status: DISCONTINUED | OUTPATIENT
Start: 2018-10-22 | End: 2018-10-23 | Stop reason: HOSPADM

## 2018-10-22 RX ORDER — NEOMYCIN SULFATE, POLYMYXIN B SULFATE AND GRAMICIDIN 1.75; 10000; .025 MG/ML; [USP'U]/ML; MG/ML
1 SOLUTION/ DROPS OPHTHALMIC 4 TIMES DAILY
Status: DISCONTINUED | OUTPATIENT
Start: 2018-10-22 | End: 2018-10-23 | Stop reason: HOSPADM

## 2018-10-22 RX ORDER — LANOLIN ALCOHOL/MO/W.PET/CERES
3 CREAM (GRAM) TOPICAL
Status: DISCONTINUED | OUTPATIENT
Start: 2018-10-22 | End: 2018-10-23 | Stop reason: HOSPADM

## 2018-10-22 RX ORDER — CLONIDINE HYDROCHLORIDE 0.1 MG/1
0.1 TABLET ORAL 2 TIMES DAILY
Status: DISCONTINUED | OUTPATIENT
Start: 2018-10-22 | End: 2018-10-23

## 2018-10-22 RX ORDER — LEVOTHYROXINE SODIUM 0.05 MG/1
50 TABLET ORAL DAILY
Status: DISCONTINUED | OUTPATIENT
Start: 2018-10-23 | End: 2018-10-23 | Stop reason: HOSPADM

## 2018-10-22 RX ORDER — BRIMONIDINE TARTRATE 0.15 %
1 DROPS OPHTHALMIC (EYE) 3 TIMES DAILY
Status: DISCONTINUED | OUTPATIENT
Start: 2018-10-22 | End: 2018-10-23 | Stop reason: HOSPADM

## 2018-10-22 RX ORDER — CARVEDILOL 25 MG/1
25 TABLET ORAL 2 TIMES DAILY WITH MEALS
Status: DISCONTINUED | OUTPATIENT
Start: 2018-10-23 | End: 2018-10-23 | Stop reason: HOSPADM

## 2018-10-22 RX ORDER — MAGNESIUM CARB/ALUMINUM HYDROX 105-160MG
296 TABLET,CHEWABLE ORAL ONCE
Status: DISCONTINUED | OUTPATIENT
Start: 2018-10-22 | End: 2018-10-23

## 2018-10-22 RX ORDER — HYDROMORPHONE HCL/PF 1 MG/ML
0.5 SYRINGE (ML) INJECTION ONCE
Status: DISCONTINUED | OUTPATIENT
Start: 2018-10-22 | End: 2018-10-22

## 2018-10-22 RX ORDER — INSULIN GLARGINE 100 [IU]/ML
24 INJECTION, SOLUTION SUBCUTANEOUS
Status: DISCONTINUED | OUTPATIENT
Start: 2018-10-22 | End: 2018-10-23 | Stop reason: HOSPADM

## 2018-10-22 RX ORDER — GABAPENTIN 100 MG/1
100 CAPSULE ORAL 3 TIMES DAILY
Status: DISCONTINUED | OUTPATIENT
Start: 2018-10-22 | End: 2018-10-23 | Stop reason: HOSPADM

## 2018-10-22 RX ORDER — TORSEMIDE 20 MG/1
200 TABLET ORAL
Status: DISCONTINUED | OUTPATIENT
Start: 2018-10-23 | End: 2018-10-23 | Stop reason: HOSPADM

## 2018-10-22 RX ORDER — LOSARTAN POTASSIUM 25 MG/1
25 TABLET ORAL DAILY
Status: DISCONTINUED | OUTPATIENT
Start: 2018-10-23 | End: 2018-10-23 | Stop reason: HOSPADM

## 2018-10-22 RX ORDER — ALPRAZOLAM 0.25 MG/1
0.12 TABLET ORAL 4 TIMES DAILY PRN
Status: DISCONTINUED | OUTPATIENT
Start: 2018-10-22 | End: 2018-10-23 | Stop reason: HOSPADM

## 2018-10-22 RX ORDER — CALCIUM CARBONATE 1250 MG/5ML
625 SUSPENSION ORAL 2 TIMES DAILY WITH MEALS
Status: DISCONTINUED | OUTPATIENT
Start: 2018-10-23 | End: 2018-10-23 | Stop reason: HOSPADM

## 2018-10-22 RX ORDER — PANTOPRAZOLE SODIUM 40 MG/1
40 TABLET, DELAYED RELEASE ORAL
Status: DISCONTINUED | OUTPATIENT
Start: 2018-10-23 | End: 2018-10-23 | Stop reason: HOSPADM

## 2018-10-22 RX ADMIN — INSULIN LISPRO 2 UNITS: 100 INJECTION, SOLUTION INTRAVENOUS; SUBCUTANEOUS at 17:28

## 2018-10-22 RX ADMIN — GABAPENTIN 100 MG: 100 CAPSULE ORAL at 22:31

## 2018-10-22 RX ADMIN — Medication 400 MG: at 22:32

## 2018-10-22 RX ADMIN — ATORVASTATIN CALCIUM 20 MG: 20 TABLET, FILM COATED ORAL at 22:32

## 2018-10-22 RX ADMIN — CLONIDINE HYDROCHLORIDE 0.1 MG: 0.1 TABLET ORAL at 22:32

## 2018-10-22 RX ADMIN — INSULIN GLARGINE 24 UNITS: 100 INJECTION, SOLUTION SUBCUTANEOUS at 22:31

## 2018-10-22 RX ADMIN — ATROPINE SULFATE 1 DROP: 10 SOLUTION/ DROPS OPHTHALMIC at 22:38

## 2018-10-22 RX ADMIN — LATANOPROST 1 DROP: 50 SOLUTION OPHTHALMIC at 22:39

## 2018-10-22 RX ADMIN — NEOMYCIN SULFATE, POLYMYXIN B SULFATE AND GRAMICIDIN 1 DROP: 1.75; 10000; .025 SOLUTION/ DROPS OPHTHALMIC at 22:39

## 2018-10-22 RX ADMIN — MELATONIN 3 MG: at 22:32

## 2018-10-22 RX ADMIN — PREDNISOLONE ACETATE 1 DROP: 10 SUSPENSION/ DROPS OPHTHALMIC at 22:39

## 2018-10-22 RX ADMIN — ACETAZOLAMIDE 125 MG: 125 TABLET ORAL at 22:33

## 2018-10-22 RX ADMIN — OXYCODONE HYDROCHLORIDE AND ACETAMINOPHEN 2 TABLET: 5; 325 TABLET ORAL at 22:32

## 2018-10-22 RX ADMIN — WARFARIN SODIUM 9 MG: 7.5 TABLET ORAL at 23:58

## 2018-10-22 RX ADMIN — BRIMONIDINE TARTRATE 1 DROP: 1.5 SOLUTION OPHTHALMIC at 22:39

## 2018-10-22 RX ADMIN — HYDROMORPHONE HYDROCHLORIDE 0.2 MG: 1 INJECTION, SOLUTION INTRAMUSCULAR; INTRAVENOUS; SUBCUTANEOUS at 10:20

## 2018-10-22 NOTE — ED PROVIDER NOTES
ASSESSMENT AND PLAN    Castro Oquendo is a 48 y o  female with a history of chronic neuropathy on gabapentin, ESRD on HD T/Th/S, prior DVT on chronic coumadin, who presents s/p multiple unwitnessed falls of indeterminant mechanism, limited 2/2 patient unreliable and unclear history, now with right flank and right knee pain as well as small right knee effusion on exam, possibly hematoma  On arrival, the patient is hemodynamically stable and well-appearing without acute distress, with a nontoxic appearance  On exam, aside from mentioned above, she has significant right flank tenderness most marked over the paraspinal muscles without overlying skin changes  Her neuro exam is grossly unremarkable, and her mental status is appropriate    -Will check CMP, CBC, Coags  Will also check EKG and monitor on telemetry to evaluate for arrhythmia  Of note, the patient has no chest pain, or recently reported chest discomfort  -single dose of dilaudid for pain while workup obtained  -due to her exam and anticoagulation status will check CT scans of the head and C-spine, as well as a noncontrast study of the abdomen to r/o retroperitoneal hematoma and spinal injury - unfortunately due to her ESRD and her contrast allergy, a contrast study will not be in the patient's best interest in this setting  Will also check plain films of the knee  -plan to re-evaluate  Disposition pending    ED COURSE    Labwork and Imaging reviewed, and discussed personally with the patient  Imaging studies largely negative, and labwork appears baseline  Unfortunately, unable to obtain contrast study to further evaluate the patient's knee effusion which may be hematoma  -EKG is not ischemic  -on re-evaluation, the patient's physical exam is unchanged, and pain is only mildly improved   She was unable to ambulate and would be unable to care for herself if discharged home, including that she may have difficulty with compliance with her dialysis due to significant pain and difficulty with mobility  -plan is admission to the medicine service for ambulatory dysfunction and severe pain s/p multiple falls, despite having no remarkable traumatic injuries based on initial imaging  I discussed this plan with the patient, and she is agreeable and endorses understanding  I discussed this case with the admitting medical attending    History  Chief Complaint   Patient presents with    Fall     pt states she fell down a flight of stairs on Thursday  denies hitting head, takes coumadin  leg pain, foot pain, and left shoulder pain  - LOC     This is a 53-year-old female with history of prior DVT on Coumadin, end-stage renal disease on hemodialysis, who presents status post a fall approximately 4 days ago  She reports an additional fall from standing within the last week which did not cause significant injury, and for which she never was evaluated  The patient states that she is on gabapentin, and this makes her unsteady on her feet  She states she fell down 4 stairs, but is unable to clarify further the manner in which she fell  However, the patient does state that she has full recollection of the event  The patient had been taking Tylenol over-the-counter for her pain, however her pain became more severe today, causing her to come the emergency department for further evaluation  She was also unable to ambulate or bear weight on the right knee secondary to pain and reported instability  Currently, the patient is complaining of right knee pain, and right flank pain  The patient denies any head strike, or loss of conscious  She denies any nausea and vomiting, lightheadedness, dizziness, abdominal pain, hematuria,            Prior to Admission Medications   Prescriptions Last Dose Informant Patient Reported? Taking?    ALPRAZolam (XANAX) 0 25 mg tablet Past Week at Unknown time  Yes Yes   Sig: Take 0 125 mg by mouth every 4 (four) hours as needed for anxiety Take on Tues, Friday and Saturday    B Complex-C-Folic Acid (TRIPHROCAPS PO) 10/21/2018 at Unknown time  Yes Yes   Sig: Take 1 capsule by mouth daily   CALCIUM CARBONATE PO Past Week at Unknown time  Yes Yes   Sig: Take 1,000 mg by mouth daily     Ferric Citrate (AURYXIA) 1  MG(Fe) TABS Past Week at Unknown time  Yes Yes   Sig: Take by mouth   Melatonin 3 MG CAPS 10/21/2018 at Unknown time  Yes Yes   Sig: Take 10 mg by mouth daily at bedtime     PARoxetine (PAXIL) 10 mg tablet 10/21/2018 at Unknown time  Yes Yes   Sig: Take 20 mg by mouth every morning Except Tues, Friday and Saturday    VIGAMOX 0 5 % ophthalmic solution 10/22/2018 at Unknown time  Yes Yes   Sig: Administer 1 drop to the right eye   acetaminophen (TYLENOL) 325 mg tablet Past Week at Unknown time  No Yes   Sig: Take 3 tablets (975 mg total) by mouth 3 (three) times a day Take for headache symptoms   atorvastatin (LIPITOR) 20 mg tablet 10/21/2018 at Unknown time  Yes Yes   atropine 1 % ophthalmic ointment 10/22/2018 at Unknown time  Yes Yes   Sig: Administer 1 application to the right eye 3 (three) times a day   brimonidine (ALPHAGAN P) 0 15 % ophthalmic solution 10/22/2018 at Unknown time  Yes Yes   Sig: Administer 1 drop to both eyes 3 (three) times a day     carvedilol (COREG) 25 mg tablet 10/21/2018 at Unknown time  Yes Yes   Sig: Take 25 mg by mouth 2 (two) times a day with meals     cholecalciferol (VITAMIN D3) 1,000 units tablet Past Week at Unknown time  Yes Yes   Sig: Take 2,000 Units by mouth daily   cinacalcet (SENSIPAR) 30 mg tablet 10/21/2018 at Unknown time  Yes Yes   Sig: Take 30 mg by mouth daily   cloNIDine (CATAPRES) 0 1 mg tablet 10/21/2018 at Unknown time  Yes Yes   Sig: Take 0 1 mg by mouth 2 (two) times a day   diphenhydrAMINE (BENADRYL) 25 mg capsule 10/21/2018 at Unknown time  Yes Yes   Sig: Take by mouth   gabapentin (NEURONTIN) 100 mg capsule 10/21/2018 at Unknown time  Yes Yes   Sig: Take 100 mg by mouth 3 (three) times a day     insulin aspart (NovoLOG) 100 units/mL injection 10/22/2018 at Unknown time  Yes Yes   Sig: Inject 1-5 Units under the skin 3 (three) times a day before meals As per sliding scale    insulin glargine (LANTUS) 100 units/mL subcutaneous injection 10/21/2018 at Unknown time  No Yes   Sig: Inject 24 Units under the skin daily at bedtime   Patient taking differently: Inject 28 Units under the skin daily at bedtime     latanoprost (XALATAN) 0 005 % ophthalmic solution 10/21/2018 at Unknown time  Yes Yes   Sig: Administer 1 drop to both eyes daily at bedtime   levothyroxine 50 mcg tablet 10/22/2018 at Unknown time  Yes Yes   Sig: Take 50 mcg by mouth daily  losartan (COZAAR) 25 mg tablet 10/21/2018 at Unknown time  No Yes   Sig: Take 1 tablet (25 mg total) by mouth daily Take on NON-Dialysis Days   magnesium oxide (MAG-OX) 400 mg More than a month at Unknown time  Yes No   Sig: Take 1 tablet by mouth 2 (two) times a day   methazolamide (NEPTAZANE) 25 MG tablet 10/21/2018 at Unknown time  Yes Yes   Sig: Take 25 mg by mouth 3 (three) times a day     omeprazole (PriLOSEC) 40 MG capsule 10/21/2018 at Unknown time  Yes Yes   Sig: Take 40 mg by mouth daily   prednisoLONE acetate (PRED FORTE) 1 % ophthalmic suspension 10/22/2018 at Unknown time  Yes Yes   Sig: Administer 1 drop to both eyes 4 (four) times a day     sertraline (ZOLOFT) 50 mg tablet 10/22/2018 at Unknown time  Yes Yes   Sig: Take 50 mg by mouth daily   torsemide (DEMADEX) 20 mg tablet 10/21/2018 at Unknown time  No Yes   Sig: Take 10 tablets (200 mg total) by mouth 2 (two) times a day   warfarin (COUMADIN) 3 mg tablet 10/21/2018 at Unknown time  Yes Yes   Sig: TAKE 3 TABLETS BY MOUTH ON SUNDAYS, AND 2 TABLETS OTHER DAYS     warfarin (COUMADIN) 5 mg tablet 10/21/2018 at Unknown time  Yes Yes   Sig: Take 12 5 tablets by mouth daily 12 5 M-F 13 Sat and Sunday       Facility-Administered Medications: None       Past Medical History:   Diagnosis Date    Abnormal uterine bleeding (AUB)     Diabetes mellitus ([facility] )     Disease of thyroid gland     DVT (deep venous thrombosis) ([facility] )     End stage kidney disease ([facility] )     Foot ulcer due to secondary DM ([facility] )     Hypertension     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[facility] )     right eye    Morbid obesity ([facility] )     Reflux esophagitis     Thrombophlebitis of saphenous vein     Warfarin anticoagulation        Past Surgical History:   Procedure Laterality Date    ARTERIOVENOUS GRAFT PLACEMENT      CERVICAL BIOPSY  W/ LOOP ELECTRODE EXCISION       SECTION      DIALYSIS FISTULA CREATION      DILATION AND CURETTAGE OF UTERUS      ENDOMETRIAL ABLATION W/ NOVASURE      PERICARDIUM SURGERY      ID LAPAROSCOPY W TOT HYSTERECT UTERUS 250 GRAM OR LESS N/A 2016    Procedure: HYSTERECTOMY LAPAROSCOPIC TOTAL ([address]), with bilateral salpingectomy;  Surgeon: Danielle Bartlett DO;  Location: [facility];  Service: Gynecology    THROMBECTOMY / ARTERIOVENOUS GRAFT REVISION      TOE SURGERY Right     removal of the 4th toe       History reviewed  No pertinent family history  I have reviewed and agree with the history as documented  Social History   Substance Use Topics    Smoking status: Never Smoker    Smokeless tobacco: Never Used    Alcohol use No        Review of Systems   Constitutional: Negative for chills and fever  HENT: Negative for congestion and rhinorrhea  Eyes: Negative for photophobia and visual disturbance  Respiratory: Negative for cough and shortness of breath  Cardiovascular: Negative for chest pain and palpitations  Gastrointestinal: Negative for abdominal pain, diarrhea, nausea and vomiting  Genitourinary: Positive for flank pain  Negative for dysuria, frequency, hematuria and pelvic pain  Musculoskeletal: Positive for back pain (lumbar distribution only), gait problem and joint swelling (right knee)  Negative for neck pain and neck stiffness  Skin: Negative for pallor and rash  Neurological: Negative for dizziness, syncope, facial asymmetry, weakness, light-headedness, numbness and headaches  All other systems reviewed and are negative  Physical Exam  ED Triage Vitals   Temperature Pulse Respirations Blood Pressure SpO2   10/22/18 0923 10/22/18 0923 10/22/18 0923 10/22/18 1010 10/22/18 0923   98 °F (36 7 °C) 86 20 140/64 100 %      Temp Source Heart Rate Source Patient Position - Orthostatic VS BP Location FiO2 (%)   10/22/18 0923 10/22/18 0923 10/22/18 0923 10/22/18 0923 --   Tympanic Monitor Sitting Right arm       Pain Score       10/22/18 0923       Worst Possible Pain           Orthostatic Vital Signs  Vitals:    10/22/18 1645 10/22/18 1942 10/22/18 2340 10/23/18 0759   BP: 112/53 127/85 132/63 162/78   Pulse: 81 82 77 70   Patient Position - Orthostatic VS:  Lying Lying Lying       Physical Exam   Constitutional: She is oriented to person, place, and time  Chronically ill appearing and disheveled  Appears older than stated age  Nontoxic in appearance  No acute distress  Mental status grossly appropriate   HENT:   Head: Normocephalic and atraumatic  Mouth/Throat: Oropharynx is clear and moist  No oropharyngeal exudate  No hemotympanum bilaterally or discharge from ear  No racoon eyes or preston sign  No anterior septal hematoma  Eyes: Pupils are equal, round, and reactive to light  No scleral icterus  Neck: Normal range of motion  No JVD present  C-spine nontender to palpation   Cardiovascular: Normal rate, regular rhythm and normal heart sounds  No murmur heard  Pulmonary/Chest: Effort normal  No respiratory distress  She has no wheezes  She has no rales  Abdominal: Soft  She exhibits no distension  There is no tenderness  Significant tenderness to palpation of the right flank over the para spinal muscles specifically  Mild midline lumbar tenderness without deformity or stepoff appreciated   Musculoskeletal: Normal range of motion  She exhibits no edema  Mild swelling of the right knee with + fluid wave, with significant overlying tenderness to palpation  ROM limited 2/2 pain   Neurological: She is alert and oriented to person, place, and time  She exhibits normal muscle tone  5/5 strength in all extremities BL, except for knee flexion and extension which is significantly limited 2/2 pain  Sensation grossly intact bilaterally  No cranial nerve deficits appreciated   Skin: Skin is warm and dry  No rash noted  No pallor         ED Medications  Medications   gabapentin (NEURONTIN) capsule 100 mg (100 mg Oral Given 10/22/18 2231)   ALPRAZolam (XANAX) tablet 0 125 mg (0 125 mg Oral Given 10/23/18 0653)   Calcium Carbonate Antacid oral suspension 625 mg (625 mg Oral Given 10/23/18 0646)   insulin lispro (HumaLOG) 100 units/mL subcutaneous injection 5 Units (5 Units Subcutaneous Not Given 10/23/18 0644)   carvedilol (COREG) tablet 25 mg (25 mg Oral Given 10/23/18 0646)   levothyroxine tablet 50 mcg (50 mcg Oral Given 10/23/18 0647)   brimonidine (ALPHAGAN P) 0 15 % ophthalmic solution 1 drop (1 drop Both Eyes Given 10/22/18 2239)   prednisoLONE acetate (PRED FORTE) 1 % ophthalmic suspension 1 drop (1 drop Both Eyes Given 10/22/18 2239)   cloNIDine (CATAPRES) tablet 0 1 mg (0 1 mg Oral Given 10/22/18 2232)   insulin glargine (LANTUS) subcutaneous injection 24 Units 0 24 mL (24 Units Subcutaneous Given 10/22/18 2231)   latanoprost (XALATAN) 0 005 % ophthalmic solution 1 drop (1 drop Both Eyes Given 10/22/18 2239)   magnesium oxide (MAG-OX) tablet 400 mg (400 mg Oral Given 10/22/18 2232)   melatonin tablet 3 mg (3 mg Oral Given 10/22/18 2232)   b complex-vitamin C-folic acid (NEPHROCAPS) capsule 1 capsule (not administered)   atorvastatin (LIPITOR) tablet 20 mg (20 mg Oral Given 10/22/18 2232)   sertraline (ZOLOFT) tablet 50 mg (not administered)   cinacalcet (SENSIPAR) tablet 30 mg (not administered)   cholecalciferol (VITAMIN D3) tablet 2,000 Units (not administered) atropine (ISOPTO ATROPINE) 1 % ophthalmic solution 1 drop (1 drop Both Eyes Given 10/22/18 2238)   acetaminophen (TYLENOL) tablet 975 mg (975 mg Oral Given 10/23/18 0646)   losartan (COZAAR) tablet 25 mg (not administered)   torsemide (DEMADEX) tablet 200 mg (not administered)   acetaZOLAMIDE (DIAMOX) tablet 125 mg (125 mg Oral Given 10/22/18 2233)   pantoprazole (PROTONIX) EC tablet 40 mg (40 mg Oral Given 10/23/18 0646)   neomycin-polymyxin-gramicidin (NEOSPORIN) 0 175%-10,000 units/mL-0 0025% ophthalmic solution 1 drop (1 drop Right Eye Given 10/22/18 2239)   polyethylene glycol (MIRALAX) packet 17 g (not administered)   ondansetron (ZOFRAN) injection 4 mg (not administered)   insulin lispro (HumaLOG) 100 units/mL subcutaneous injection 1-5 Units (1 Units Subcutaneous Not Given 10/23/18 0643)   warfarin (COUMADIN) tablet 6 mg (not administered)   warfarin (COUMADIN) tablet 9 mg (not administered)   PARoxetine (PAXIL) tablet 20 mg (not administered)   diclofenac sodium (VOLTAREN) 1 % topical gel 2 g (not administered)   HYDROmorphone (DILAUDID) injection 0 2 mg (0 2 mg Intravenous Given 10/22/18 1020)   oxyCODONE-acetaminophen (PERCOCET) 5-325 mg per tablet 2 tablet (2 tablets Oral Given 10/22/18 2232)   warfarin (COUMADIN) tablet 9 mg (9 mg Oral Given 10/22/18 2358)       Diagnostic Studies  Results Reviewed     Procedure Component Value Units Date/Time    Hemoglobin A1c w/EAG Estimation (Orders if not completed within the last 90 days) [[id number]]  (Abnormal) Collected:  10/23/18 0519    Lab Status:  Final result Specimen:  Blood from Arm, Right Updated:  10/23/18 0615     Hemoglobin A1C 7 2 (H) %       mg/dl     Fingerstick Glucose (POCT) [[id number]]  (Abnormal) Collected:  10/22/18 1725    Lab Status:  Final result Updated:  10/22/18 1726     POC Glucose 243 (H) mg/dl     Protime-INR [[id number]]  (Abnormal) Collected:  10/22/18 1039    Lab Status:  Final result Specimen:  Blood from Arm, Right Updated: 10/22/18 1109     Protime 15 5 (H) seconds      INR 1 22 (H)    APTT [[id number]]  (Normal) Collected:  10/22/18 1039    Lab Status:  Final result Specimen:  Blood from Arm, Right Updated:  10/22/18 1109     PTT 27 seconds     Comprehensive metabolic panel [[id number]]  (Abnormal) Collected:  10/22/18 1039    Lab Status:  Final result Specimen:  Blood from Arm, Right Updated:  10/22/18 1105     Sodium 133 (L) mmol/L      Potassium 5 2 mmol/L      Chloride 98 (L) mmol/L      CO2 25 mmol/L      ANION GAP 10 mmol/L      BUN 82 (H) mg/dL      Creatinine 8 61 (H) mg/dL      Glucose 197 (H) mg/dL      Calcium 8 6 mg/dL      AST 9 U/L      ALT 11 (L) U/L      Alkaline Phosphatase 127 (H) U/L      Total Protein 7 1 g/dL      Albumin 3 2 (L) g/dL      Total Bilirubin 0 35 mg/dL      eGFR 5 ml/min/1 73sq m     Narrative:         National Kidney Disease Education Program recommendations are as follows:  GFR calculation is accurate only with a steady state creatinine  Chronic Kidney disease less than 60 ml/min/1 73 sq  meters  Kidney failure less than 15 ml/min/1 73 sq  meters      CBC and differential [[id number]]  (Abnormal) Collected:  10/22/18 1039    Lab Status:  Final result Specimen:  Blood from Arm, Right Updated:  10/22/18 1046     WBC 7 82 Thousand/uL      RBC 2 85 (L) Million/uL      Hemoglobin 9 0 (L) g/dL      Hematocrit 27 4 (L) %      MCV 96 fL      MCH 31 6 pg      MCHC 32 8 g/dL      RDW 12 4 %      MPV 10 7 fL      Platelets 616 Thousands/uL      nRBC 0 /100 WBCs      Neutrophils Relative 77 (H) %      Immat GRANS % 0 %      Lymphocytes Relative 13 (L) %      Monocytes Relative 8 %      Eosinophils Relative 2 %      Basophils Relative 0 %      Neutrophils Absolute 5 98 Thousands/µL      Immature Grans Absolute 0 03 Thousand/uL      Lymphocytes Absolute 0 99 Thousands/µL      Monocytes Absolute 0 64 Thousand/µL      Eosinophils Absolute 0 15 Thousand/µL      Basophils Absolute 0 03 Thousands/µL     UA w Reflex to Microscopic w Reflex to Culture [[id number]]     Lab Status:  No result Specimen:  Urine                  CT cervical spine without contrast   Final Result by Godwin Sin MD (10/22 1212)      No cervical spine fracture or traumatic malalignment  Workstation performed: [id number]         CT abdomen pelvis wo contrast   Final Result by Godwin Sin MD (10/22 1209)      No acute intra-abdominal or intrapelvic process insofar as can be detected on a noncontrast CT  Peripheral bilateral subcutaneous abdominal wall excluded secondary to body habitus  No hematoma in the visualized portions of the of the abdominal wall  No significant interval change  Workstation performed: [id number]         XR chest 2 views   Final Result by Rudy Montiel MD (10/22 1203)      No acute cardiopulmonary disease  Workstation performed: [id number]         CT head without contrast   Final Result by Godwin Sin MD (10/22 1138)      No acute intracranial process or significant interval change  No skull fracture  Chronic microangiopathy  Workstation performed: [id number]         XR knee 4+ views Right injury   Final Result by Bill Basilio MD (10/22 1137)   There may be a trace joint effusion  Minimal degenerative change  No acute osseous abnormality  Workstation performed: [id number]               Procedures  ECG 12 Lead Documentation  Date/Time: 10/23/2018 9:04 AM  Performed by: Nancy Marcial by: Nicolle Loredo     Patient location:  ED  Previous ECG:     Previous ECG:  Compared to current    Similarity:  No change    Comparison to cardiac monitor: Yes    Interpretation:     Interpretation: normal    Comments:      Normal sinus rhythm with a heart rate of 79, no ST/T-wave changes, normal axis, normal intervals    No changes noted on comparison to prior EKG          Phone Consults  ED Phone Contact    ED Course MDM  Number of Diagnoses or Management Options  Ambulatory dysfunction:   Effusion of right knee:     CritCare Time    Disposition  Final diagnoses:   Ambulatory dysfunction   Effusion of right knee     Time reflects when diagnosis was documented in both MDM as applicable and the Disposition within this note     Time User Action Codes Description Comment    10/22/2018  2:14 PM Genisidoro Drake Add [R26 2] Ambulatory dysfunction     10/22/2018  2:14 PM isidoro Drake Add [M25 461] Effusion of right knee     10/22/2018  2:39 PM Idell Kingdom B Modify [R26 2] Ambulatory dysfunction     10/22/2018  2:39 PM Idell Kingdom B Add [N18 6] ESRD (end stage renal disease) ([facility]     10/22/2018  2:39 PM Idell Kingdom B Modify [R26 2] Ambulatory dysfunction     10/22/2018  2:39 PM Idell Kingdom B Modify [N18 6] ESRD (end stage renal disease) ([facility] )     10/22/2018  2:39 PM Idell Kingdom B Modify [R26 2] Ambulatory dysfunction     10/22/2018  2:39 PM Idell Kingdom B Modify [R26 2] Ambulatory dysfunction     10/22/2018  2:39 PM Idell Kingdom B Modify [R26 2] Ambulatory dysfunction     10/22/2018  2:39 PM Idell Kingdom B Add [M25 569] Knee pain     10/22/2018  2:39 PM Idell Kingdom B Modify [R26 2] Ambulatory dysfunction     10/22/2018  2:39 PM Idell Kingdom Modify [M25 569] Knee pain     10/22/2018  2:39 PM Idell Kingdom B Modify [R26 2] Ambulatory dysfunction     10/22/2018  2:39 PM Idell Kingdom B Modify [R26 2] Ambulatory dysfunction       ED Disposition     ED Disposition Condition Comment    Admit  Case was discussed with HARISH and the patient's admission status was agreed to be Admission Status: observation status to the service of Dr Fco Melendez           Follow-up Information    None         Current Discharge Medication List      CONTINUE these medications which have NOT CHANGED    Details   acetaminophen (TYLENOL) 325 mg tablet Take 3 tablets (975 mg total) by mouth 3 (three) times a day Take for headache symptoms  Qty: 30 tablet, Refills: 0    Associated Diagnoses: Bilateral headache      ALPRAZolam (XANAX) 0 25 mg tablet Take 0 125 mg by mouth every 4 (four) hours as needed for anxiety Take on Tues, Friday and Saturday       atorvastatin (LIPITOR) 20 mg tablet Refills: 0      atropine 1 % ophthalmic ointment Administer 1 application to the right eye 3 (three) times a day      B Complex-C-Folic Acid (TRIPHROCAPS PO) Take 1 capsule by mouth daily      brimonidine (ALPHAGAN P) 0 15 % ophthalmic solution Administer 1 drop to both eyes 3 (three) times a day        CALCIUM CARBONATE PO Take 1,000 mg by mouth daily  carvedilol (COREG) 25 mg tablet Take 25 mg by mouth 2 (two) times a day with meals        cholecalciferol (VITAMIN D3) 1,000 units tablet Take 2,000 Units by mouth daily      cinacalcet (SENSIPAR) 30 mg tablet Take 30 mg by mouth daily      cloNIDine (CATAPRES) 0 1 mg tablet Take 0 1 mg by mouth 2 (two) times a day      diphenhydrAMINE (BENADRYL) 25 mg capsule Take by mouth      Ferric Citrate (AURYXIA) 1  MG(Fe) TABS Take by mouth      gabapentin (NEURONTIN) 100 mg capsule Take 100 mg by mouth 3 (three) times a day        insulin aspart (NovoLOG) 100 units/mL injection Inject 1-5 Units under the skin 3 (three) times a day before meals As per sliding scale       insulin glargine (LANTUS) 100 units/mL subcutaneous injection Inject 24 Units under the skin daily at bedtime  Qty: 720 Units, Refills: 0      latanoprost (XALATAN) 0 005 % ophthalmic solution Administer 1 drop to both eyes daily at bedtime      levothyroxine 50 mcg tablet Take 50 mcg by mouth daily        losartan (COZAAR) 25 mg tablet Take 1 tablet (25 mg total) by mouth daily Take on NON-Dialysis Days    Associated Diagnoses: Hypertensive urgency      Melatonin 3 MG CAPS Take 10 mg by mouth daily at bedtime        methazolamide (NEPTAZANE) 25 MG tablet Take 25 mg by mouth 3 (three) times a day        omeprazole (PriLOSEC) 40 MG capsule Take 40 mg by mouth daily      PARoxetine (PAXIL) 10 mg tablet Take 20 mg by mouth every morning Except Tues, Friday and Saturday       prednisoLONE acetate (PRED FORTE) 1 % ophthalmic suspension Administer 1 drop to both eyes 4 (four) times a day        sertraline (ZOLOFT) 50 mg tablet Take 50 mg by mouth daily      torsemide (DEMADEX) 20 mg tablet Take 10 tablets (200 mg total) by mouth 2 (two) times a day  Refills: 0    Associated Diagnoses: Hypertensive urgency      VIGAMOX 0 5 % ophthalmic solution Administer 1 drop to the right eye  Refills: 0      !! warfarin (COUMADIN) 3 mg tablet TAKE 3 TABLETS BY MOUTH ON SUNDAYS, AND 2 TABLETS OTHER DAYS  Refills: 0      !! warfarin (COUMADIN) 5 mg tablet Take 12 5 tablets by mouth daily 12 5 M-F 13 Sat and Sunday       magnesium oxide (MAG-OX) 400 mg Take 1 tablet by mouth 2 (two) times a day       !! - Potential duplicate medications found  Please discuss with provider  No discharge procedures on file  ED Provider  Attending physically available and evaluated Cam Meeks  I managed the patient along with the ED Attending      Electronically Signed by         Jean Paul Rosas MD  10/23/18 4884

## 2018-10-22 NOTE — H&P
H&P- Erna Vital 1965, 48 y o  female MRN: [id number]    Unit/Bed#: ED 28 Encounter: [id number]    Primary Care Provider: Cherylene Fish, MD   Date and time admitted to hospital: 10/22/2018  9:24 AM        * Knee pain   Assessment & Plan    Right knee pain following mechanical fall  Patient is on Coumadin  Will request Orthopedic inputs     ESRD on hemodialysis [facility])   Assessment & Plan    On hemodialysis  Will request nephrology inputs     Ambulatory dysfunction   Assessment & Plan    Due to right knee pain  Fall precautions safe ambulation  Physical therapy inputs     Type 2 diabetes mellitus ([facility] [address] )   Assessment & Plan    Lab Results   Component Value Date    HGBA1C 6 2 05/23/2018       No results for input(s): POCGLU in the last 72 hours  Blood Sugar Average: Last 72 hrs:       Check A1c  Monitor Accu-Cheks closely  Continue insulin  Avoid hypoglycemia     Essential hypertension   Assessment & Plan    Monitor blood pressure     History of DVT (deep vein thrombosis)   Assessment & Plan    Continue Coumadin  Monitor INR     Anxiety disorder   Assessment & Plan    Continuing anxiolytics         VTE Prophylaxis: Warfarin (Coumadin)  / sequential compression device   Code Status:  Full code  POLST: There is no POLST form on file for this patient (pre-hospital)  Discussion with family:  Discussed with the patient, her family is at bedside detailed updated provided  They are agreeable with ongoing management including consult requested    Anticipated Length of Stay:  Patient will be admitted on an Observation basis with an anticipated length of stay of  Less than 2 midnights  Chief Complaint:       Right knee pain swelling  Ambulatory dysfunction    History of Present Illness:    Erna Vital is a 48 y o  female who presents with patient had a mechanical fall couple of days back she noticed swelling of her right knee    Since then she has been unable to bear weight due to right knee pain and swelling, she is also having difficulty ambulating  Patient reports being unsteady on her legs  No history of fever chills sweats or constitutional symptoms  No history of bleeding  Denies chest pain shortness of breath palpitations presyncope syncope  Denies cough chest tightness wheezing  Denies abdominal pain nausea vomiting diarrhea per    Review of Systems:    Review of Systems   All other systems reviewed and are negative  Past Medical and Surgical History:     Past Medical History:   Diagnosis Date    Abnormal uterine bleeding (AUB)     Diabetes mellitus ([facility] [address] )     Disease of thyroid gland     DVT (deep venous thrombosis) (HCC)     End stage kidney disease (HCC)     Foot ulcer due to secondary DM ([facility] [address] )     Hypertension     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[facility] [address] )     right eye    Morbid obesity ([facility] [address] )     Reflux esophagitis     Thrombophlebitis of saphenous vein     Warfarin anticoagulation        Past Surgical History:   Procedure Laterality Date    ARTERIOVENOUS GRAFT PLACEMENT      CERVICAL BIOPSY  W/ LOOP ELECTRODE EXCISION       SECTION      DIALYSIS FISTULA CREATION      DILATION AND CURETTAGE OF UTERUS      ENDOMETRIAL ABLATION W/ NOVASURE      PERICARDIUM SURGERY      MI LAPAROSCOPY W TOT HYSTERECT UTERUS 250 GRAM OR LESS N/A 2016    Procedure: HYSTERECTOMY LAPAROSCOPIC TOTAL ([address]), with bilateral salpingectomy;  Surgeon: Nannette Reason, DO;  Location: [facility];  Service: Gynecology    THROMBECTOMY / ARTERIOVENOUS GRAFT REVISION      TOE SURGERY Right     removal of the 4th toe       Meds/Allergies:    Prior to Admission medications    Medication Sig Start Date End Date Taking?  Authorizing Provider   acetaminophen (TYLENOL) 325 mg tablet Take 3 tablets (975 mg total) by mouth 3 (three) times a day Take for headache symptoms 18   Dean Childress PA-C   ALPRAZolam Shikha Oneil) 0 25 mg tablet Take 0 125 mg by mouth every 4 (four) hours as needed for anxiety Take on Tues, Friday and Saturday     Historical Provider, MD   atorvastatin (LIPITOR) 20 mg tablet  4/24/18   Historical Provider, MD   atropine 1 % ophthalmic ointment Administer 1 application to the right eye 3 (three) times a day    Historical Provider, MD   B Complex-C-Folic Acid (TRIPHROCAPS PO) Take 1 capsule by mouth daily 1/13/16   Historical Provider, MD   brimonidine (ALPHAGAN P) 0 15 % ophthalmic solution Administer 1 drop to both eyes 3 (three) times a day      Historical Provider, MD   CALCIUM CARBONATE PO Take 1,000 mg by mouth daily  Historical Provider, MD   carvedilol (COREG) 25 mg tablet Take 25 mg by mouth 2 (two) times a day with meals      Historical Provider, MD   cholecalciferol (VITAMIN D3) 1,000 units tablet Take 2,000 Units by mouth daily    Historical Provider, MD   cinacalcet (SENSIPAR) 30 mg tablet Take 30 mg by mouth daily    Historical Provider, MD   cloNIDine (CATAPRES) 0 1 mg tablet Take 0 1 mg by mouth 2 (two) times a day    Historical Provider, MD   diphenhydrAMINE (BENADRYL) 25 mg capsule Take by mouth    Historical Provider, MD   Ferric Citrate (AURYXIA) 1  MG(Fe) TABS Take by mouth 3/29/17   Historical Provider, MD   gabapentin (NEURONTIN) 100 mg capsule Take 100 mg by mouth 3 (three) times a day      Historical Provider, MD   insulin aspart (NovoLOG) 100 units/mL injection Inject 1-5 Units under the skin 3 (three) times a day before meals As per sliding scale     Historical Provider, MD   insulin glargine (LANTUS) 100 units/mL subcutaneous injection Inject 24 Units under the skin daily at bedtime  Patient taking differently: Inject 28 Units under the skin daily at bedtime   2/19/17   Jazmin Jordan MD   latanoprost (XALATAN) 0 005 % ophthalmic solution Administer 1 drop to both eyes daily at bedtime    Historical Provider, MD   levothyroxine 50 mcg tablet Take 50 mcg by mouth daily      Historical Provider, MD   losartan (COZAAR) 25 mg tablet Take 1 tablet (25 mg total) by mouth daily Take on NON-Dialysis Days 5/23/18   Ting Cheney PA-C   magnesium oxide (MAG-OX) 400 mg Take 1 tablet by mouth 2 (two) times a day 8/26/14   Historical Provider, MD   Melatonin 3 MG CAPS Take 10 mg by mouth daily at bedtime      Historical Provider, MD   methazolamide (NEPTAZANE) 25 MG tablet Take 25 mg by mouth 3 (three) times a day      Historical Provider, MD   omeprazole (PriLOSEC) 40 MG capsule Take 40 mg by mouth daily    Historical Provider, MD   PARoxetine (PAXIL) 10 mg tablet Take 20 mg by mouth every morning Except Tues, Friday and Saturday     Historical Provider, MD   prednisoLONE acetate (PRED FORTE) 1 % ophthalmic suspension Administer 1 drop to both eyes 4 (four) times a day      Historical Provider, MD   sertraline (ZOLOFT) 50 mg tablet Take 50 mg by mouth daily    Historical Provider, MD   torsemide (DEMADEX) 20 mg tablet Take 10 tablets (200 mg total) by mouth 2 (two) times a day 5/23/18   Ting Cheney PA-C   VIGAMOX 0 5 % ophthalmic solution Administer 1 drop to the right eye 2/23/18   Historical Provider, MD   warfarin (COUMADIN) 3 mg tablet TAKE 3 TABLETS BY MOUTH ON SUNDAYS, AND 2 TABLETS OTHER DAYS  9/27/18   Historical Provider, MD   warfarin (COUMADIN) 5 mg tablet Take 12 5 tablets by mouth daily 12 5 M-F 13 Sat and Uriel  10/30/15   Historical Provider, MD   cyclobenzaprine (FLEXERIL) 10 mg tablet Take 1 tablet (10 mg total) by mouth daily at bedtime for 5 days Take for headache 5/23/18 10/22/18  Ting Cheney PA-C     I have reviewed home medications with patient personally  Allergies:    Allergies   Allergen Reactions    Iodinated Diagnostic Agents Itching       Social History:     Marital Status: Single   Occupation:   Patient Pre-hospital Living Situation: home  Patient Pre-hospital Level of Mobility:  Ambulatory dysfunction  Patient Pre-hospital Diet Restrictions: no  Substance Use History:   History   Alcohol Use No     History   Smoking Status    Never Smoker   Smokeless Tobacco    Never Used     History   Drug Use No       Family History:    History reviewed  No pertinent family history  Physical Exam:     Vitals:   Blood Pressure: 141/60 (10/22/18 1305)  Pulse: 79 (10/22/18 1305)  Temperature: 98 °F (36 7 °C) (10/22/18 0923)  Temp Source: Tympanic (10/22/18 1578)  Respirations: 20 (10/22/18 1305)  Height: 5' 6" (167 6 cm) (10/22/18 2406)  Weight - Scale: 109 kg (240 lb) (10/22/18 0923)  SpO2: 100 % (10/22/18 1305)    Physical Exam    Obese  Short thick neck  Mucous members are moist, EOMI  Lungs diminished breath sounds bilaterally, no additional sounds  Heart sounds S1 and S2 noted  Abdomen soft nontender  Awake obeys simple commands  No rash  Pulses noted  Right knee effusion noted mild tenderness noted      Additional Data:     Lab Results: I have personally reviewed pertinent reports  Results from last 7 days  Lab Units 10/22/18  1039   WBC Thousand/uL 7 82   HEMOGLOBIN g/dL 9 0*   HEMATOCRIT % 27 4*   PLATELETS Thousands/uL 166   NEUTROS ABS Thousands/µL 5 98   NEUTROS PCT % 77*   LYMPHS PCT % 13*   MONOS PCT % 8   EOS PCT % 2       Results from last 7 days  Lab Units 10/22/18  1039   SODIUM mmol/L 133*   POTASSIUM mmol/L 5 2   CHLORIDE mmol/L 98*   CO2 mmol/L 25   BUN mg/dL 82*   CREATININE mg/dL 8 61*   ANION GAP mmol/L 10   CALCIUM mg/dL 8 6   ALBUMIN g/dL 3 2*   TOTAL BILIRUBIN mg/dL 0 35   ALK PHOS U/L 127*   ALT U/L 11*   AST U/L 9       Results from last 7 days  Lab Units 10/22/18  1039   INR  1 22*       Imaging: I have personally reviewed pertinent reports  CT cervical spine without contrast   Final Result by April Powers MD (10/22 1212)      No cervical spine fracture or traumatic malalignment                     Workstation performed: [id number]         CT abdomen pelvis wo contrast   Final Result by April Powers MD (10/22 1209)      No acute intra-abdominal or intrapelvic process insofar as can be detected on a noncontrast CT       Peripheral bilateral subcutaneous abdominal wall excluded secondary to body habitus  No hematoma in the visualized portions of the of the abdominal wall  No significant interval change  Workstation performed: [id number]         XR chest 2 views   Final Result by Haley Nunes MD (10/22 1203)      No acute cardiopulmonary disease  Workstation performed: [id number]         CT head without contrast   Final Result by Lynda Hernandez MD (10/22 1138)      No acute intracranial process or significant interval change  No skull fracture  Chronic microangiopathy  Workstation performed: [id number]         XR knee 4+ views Right injury   Final Result by Flori Tapia MD (10/22 1137)   There may be a trace joint effusion  Minimal degenerative change  No acute osseous abnormality  Workstation performed: [id number]             EKG, Pathology, and Other Studies Reviewed on Admission:   · EKG:  Normal sinus rhythm no ST T-wave changes    Allscripts / Epic Records Reviewed: Yes     ** Please Note: This note has been constructed using a voice recognition system   **

## 2018-10-22 NOTE — ED ATTENDING ATTESTATION
Mitch Dawn MD, saw and evaluated the patient  I have discussed the patient with the resident/non-physician practitioner and agree with the resident's/non-physician practitioner's findings, Plan of Care, and MDM as documented in the resident's/non-physician practitioner's note, except where noted  All available labs and Radiology studies were reviewed  At this point I agree with the current assessment done in the Emergency Department  I have conducted an independent evaluation of this patient a history and physical is as follows:    OA: 47 y/o f with ESRD on dialysis, DM/neuropathy and DVT/PE on coumadin presents for evaluation s/p fall down steps Thursday  Pt states she lost her footing and fell down  4-5 steps on her R side  Denies hitting her head or LOC  Pt is on anti-coagulation  + pain with ambulation in her R knee that has been progressive since the fall  Also c/o R flank pain  Compliant with dialysis, most recent complete session was Saturday  No fevers/chills  No headache  NO dizziness  No cp/sob  PE, well developed f in NAD, mildly HTN BP, VSS, NC/AT, MMM, clear sclera/conjunctiva, PERRL, neck supple/FROM, -midilne ttp, no stepoff or deformity, RR, lungs CTAB, abd soft, + BS, - contusions/abrasions, + ttp over R flank, -midline ttp over T or L spine, no stepoff or deformity, - ecchymosis, + swelling of R knee with decreased ROM and ttp over lateral malleolus, intact distal pulses, capillary refill < 2 sec, AAO  A/p mechanical fall on coumadin  5 days ago   Labs including INR, imaging, treat accordingly    Critical Care Time  CritCare Time    Procedures

## 2018-10-22 NOTE — ASSESSMENT & PLAN NOTE
Lab Results   Component Value Date    HGBA1C 6 2 05/23/2018       No results for input(s): POCGLU in the last 72 hours      Blood Sugar Average: Last 72 hrs:       Check A1c  Monitor Accu-Cheks closely  Continue insulin  Avoid hypoglycemia

## 2018-10-23 ENCOUNTER — APPOINTMENT (OUTPATIENT)
Dept: DIALYSIS | Facility: HOSPITAL | Age: 53
End: 2018-10-23
Payer: MEDICARE

## 2018-10-23 VITALS
HEART RATE: 67 BPM | RESPIRATION RATE: 18 BRPM | SYSTOLIC BLOOD PRESSURE: 133 MMHG | TEMPERATURE: 97.4 F | DIASTOLIC BLOOD PRESSURE: 55 MMHG | WEIGHT: 243.17 LBS | HEIGHT: 66 IN | BODY MASS INDEX: 39.08 KG/M2 | OXYGEN SATURATION: 96 %

## 2018-10-23 PROBLEM — D63.1 ANEMIA IN END-STAGE RENAL DISEASE (HCC): Status: ACTIVE | Noted: 2018-10-23

## 2018-10-23 PROBLEM — N18.6 ANEMIA IN END-STAGE RENAL DISEASE (HCC): Status: ACTIVE | Noted: 2018-10-23

## 2018-10-23 PROBLEM — Z99.2 HEMODIALYSIS STATUS (HCC): Status: ACTIVE | Noted: 2018-10-23

## 2018-10-23 LAB
ANION GAP SERPL CALCULATED.3IONS-SCNC: 11 MMOL/L (ref 4–13)
BASOPHILS # BLD AUTO: 0.03 THOUSANDS/ΜL (ref 0–0.1)
BASOPHILS NFR BLD AUTO: 0 % (ref 0–1)
BUN SERPL-MCNC: 99 MG/DL (ref 5–25)
CALCIUM SERPL-MCNC: 8.5 MG/DL (ref 8.3–10.1)
CHLORIDE SERPL-SCNC: 100 MMOL/L (ref 100–108)
CO2 SERPL-SCNC: 25 MMOL/L (ref 21–32)
CREAT SERPL-MCNC: 9.94 MG/DL (ref 0.6–1.3)
EOSINOPHIL # BLD AUTO: 0.14 THOUSAND/ΜL (ref 0–0.61)
EOSINOPHIL NFR BLD AUTO: 2 % (ref 0–6)
ERYTHROCYTE [DISTWIDTH] IN BLOOD BY AUTOMATED COUNT: 12.4 % (ref 11.6–15.1)
EST. AVERAGE GLUCOSE BLD GHB EST-MCNC: 160 MG/DL
GFR SERPL CREATININE-BSD FRML MDRD: 4 ML/MIN/1.73SQ M
GLUCOSE SERPL-MCNC: 122 MG/DL (ref 65–140)
GLUCOSE SERPL-MCNC: 300 MG/DL (ref 65–140)
GLUCOSE SERPL-MCNC: 81 MG/DL (ref 65–140)
GLUCOSE SERPL-MCNC: 87 MG/DL (ref 65–140)
HBA1C MFR BLD: 7.2 % (ref 4.2–6.3)
HCT VFR BLD AUTO: 26.3 % (ref 34.8–46.1)
HGB BLD-MCNC: 8.4 G/DL (ref 11.5–15.4)
IMM GRANULOCYTES # BLD AUTO: 0.02 THOUSAND/UL (ref 0–0.2)
IMM GRANULOCYTES NFR BLD AUTO: 0 % (ref 0–2)
INR PPP: 1.26 (ref 0.86–1.17)
LYMPHOCYTES # BLD AUTO: 1.46 THOUSANDS/ΜL (ref 0.6–4.47)
LYMPHOCYTES NFR BLD AUTO: 20 % (ref 14–44)
MCH RBC QN AUTO: 31.2 PG (ref 26.8–34.3)
MCHC RBC AUTO-ENTMCNC: 31.9 G/DL (ref 31.4–37.4)
MCV RBC AUTO: 98 FL (ref 82–98)
MONOCYTES # BLD AUTO: 0.59 THOUSAND/ΜL (ref 0.17–1.22)
MONOCYTES NFR BLD AUTO: 8 % (ref 4–12)
NEUTROPHILS # BLD AUTO: 5.08 THOUSANDS/ΜL (ref 1.85–7.62)
NEUTS SEG NFR BLD AUTO: 70 % (ref 43–75)
NRBC BLD AUTO-RTO: 0 /100 WBCS
PLATELET # BLD AUTO: 167 THOUSANDS/UL (ref 149–390)
PMV BLD AUTO: 10.9 FL (ref 8.9–12.7)
POTASSIUM SERPL-SCNC: 5.5 MMOL/L (ref 3.5–5.3)
PROTHROMBIN TIME: 15.9 SECONDS (ref 11.8–14.2)
RBC # BLD AUTO: 2.69 MILLION/UL (ref 3.81–5.12)
SODIUM SERPL-SCNC: 136 MMOL/L (ref 136–145)
WBC # BLD AUTO: 7.32 THOUSAND/UL (ref 4.31–10.16)

## 2018-10-23 PROCEDURE — G0257 UNSCHED DIALYSIS ESRD PT HOS: HCPCS

## 2018-10-23 PROCEDURE — 99217 PR OBSERVATION CARE DISCHARGE MANAGEMENT: CPT | Performed by: INTERNAL MEDICINE

## 2018-10-23 PROCEDURE — 85025 COMPLETE CBC W/AUTO DIFF WBC: CPT | Performed by: INTERNAL MEDICINE

## 2018-10-23 PROCEDURE — 80048 BASIC METABOLIC PNL TOTAL CA: CPT | Performed by: INTERNAL MEDICINE

## 2018-10-23 PROCEDURE — 82948 REAGENT STRIP/BLOOD GLUCOSE: CPT

## 2018-10-23 PROCEDURE — 99204 OFFICE O/P NEW MOD 45 MIN: CPT | Performed by: ORTHOPAEDIC SURGERY

## 2018-10-23 PROCEDURE — 83036 HEMOGLOBIN GLYCOSYLATED A1C: CPT | Performed by: INTERNAL MEDICINE

## 2018-10-23 PROCEDURE — PBNCHG PB NO CHARGE PLACEHOLDER: Performed by: INTERNAL MEDICINE

## 2018-10-23 RX ORDER — OXYCODONE HYDROCHLORIDE 5 MG/1
TABLET ORAL
Qty: 30 TABLET | Refills: 0 | Status: SHIPPED | OUTPATIENT
Start: 2018-10-23 | End: 2019-04-18

## 2018-10-23 RX ORDER — CLONIDINE HYDROCHLORIDE 0.1 MG/1
0.1 TABLET ORAL 2 TIMES DAILY
Status: DISCONTINUED | OUTPATIENT
Start: 2018-10-23 | End: 2018-10-23 | Stop reason: HOSPADM

## 2018-10-23 RX ADMIN — PREDNISOLONE ACETATE 1 DROP: 10 SUSPENSION/ DROPS OPHTHALMIC at 13:20

## 2018-10-23 RX ADMIN — ALPRAZOLAM 0.12 MG: 0.25 TABLET ORAL at 06:53

## 2018-10-23 RX ADMIN — GABAPENTIN 100 MG: 100 CAPSULE ORAL at 15:19

## 2018-10-23 RX ADMIN — TORSEMIDE 200 MG: 20 TABLET ORAL at 15:19

## 2018-10-23 RX ADMIN — CINACALCET HYDROCHLORIDE 30 MG: 30 TABLET, COATED ORAL at 13:20

## 2018-10-23 RX ADMIN — CARVEDILOL 25 MG: 25 TABLET, FILM COATED ORAL at 06:46

## 2018-10-23 RX ADMIN — PANTOPRAZOLE SODIUM 40 MG: 40 TABLET, DELAYED RELEASE ORAL at 06:46

## 2018-10-23 RX ADMIN — NEOMYCIN SULFATE, POLYMYXIN B SULFATE AND GRAMICIDIN 1 DROP: 1.75; 10000; .025 SOLUTION/ DROPS OPHTHALMIC at 13:20

## 2018-10-23 RX ADMIN — ATROPINE SULFATE 1 DROP: 10 SOLUTION/ DROPS OPHTHALMIC at 15:19

## 2018-10-23 RX ADMIN — SERTRALINE HYDROCHLORIDE 50 MG: 50 TABLET ORAL at 13:20

## 2018-10-23 RX ADMIN — INSULIN LISPRO 5 UNITS: 100 INJECTION, SOLUTION INTRAVENOUS; SUBCUTANEOUS at 13:19

## 2018-10-23 RX ADMIN — ACETAZOLAMIDE 125 MG: 125 TABLET ORAL at 15:19

## 2018-10-23 RX ADMIN — ACETAMINOPHEN 975 MG: 325 TABLET, FILM COATED ORAL at 13:20

## 2018-10-23 RX ADMIN — LEVOTHYROXINE SODIUM 50 MCG: 50 TABLET ORAL at 06:47

## 2018-10-23 RX ADMIN — DICLOFENAC SODIUM 2 G: 10 GEL TOPICAL at 13:21

## 2018-10-23 RX ADMIN — Medication 1 CAPSULE: at 13:20

## 2018-10-23 RX ADMIN — BRIMONIDINE TARTRATE 1 DROP: 1.5 SOLUTION OPHTHALMIC at 15:19

## 2018-10-23 RX ADMIN — ACETAMINOPHEN 975 MG: 325 TABLET, FILM COATED ORAL at 06:46

## 2018-10-23 RX ADMIN — CALCIUM CARBONATE 625 MG: 1250 SUSPENSION ORAL at 06:46

## 2018-10-23 RX ADMIN — VITAMIN D, TAB 1000IU (100/BT) 2000 UNITS: 25 TAB at 13:20

## 2018-10-23 NOTE — SOCIAL WORK
Initial interview and DC Dash:     CM met with the patient to review the CM role and discuss possible dc needs  Pt lives with her boyfriend and her twin 7 yo Sons in a 2 story home in [place], [place]  [address]  2nd floor bedroom and bathroom  Pt is independent, unemployed and does not drive  No DME  Hx of [facility]  No hx of IP rehab  Pt denied drug, etoh or mental illness rehab  No POA or LW  Prescriptions are filled at [facility] on [address] [facility] in [place]  Main contact:   Boyfrienelroy Gandhi [phone] [address] [facility] [phone]    Admit Dx R Knee pain s/p Fall  Hx ESRD on HD, DM with R eye blindness  Pt offered little information to assess her knowledge of her diagnoses and treatment regimen  Pt requested RW and OP PT for her dc plan  RW script obtained and [address] referral sent to [facility]  Pt given OP PT script by RN  Pt written for discharge and awaiting delivery of  RW  Pt denied any other needs at this time  CM reviewed d/c planning process including the following: identifying help at home, patient preference for d/c planning needs, Discharge Lounge, [facility] Meds to Bed program, availability of treatment team to discuss questions or concerns patient and/or family may have regarding understanding medications and recognizing signs and symptoms once discharged  CM also encouraged patient to follow up with all recommended appointments after discharge  Patient advised of importance for patient and family to participate in managing patients medical well being

## 2018-10-23 NOTE — ASSESSMENT & PLAN NOTE
· Due to right knee pain   Has been seeing [facility] and recommended to undergo knee replacement but has been waiting because she wants renal transplant first   · OP ortho f/u  · OP PT script given  · Rolling walker script given

## 2018-10-23 NOTE — CONSULTS
Consultation - Nephrology   Damion Thompson 48 y o  female MRN: [id number]  Unit/Bed#: [facility] 212-01 Encounter: [id number]      ASSESSMENT / PLAN:     1  End-stage renal disease  · Maintained on a TTS schedule at [address] [facility]  · The patient was seen examined on hemodialysis at 9:30 a m  Her orders were reviewed with her bedside dialysis nurse  She is hemodynamically stable in her access blood flow is acceptable  · Will continue her current dialysis regimen  2  Anemia  · Not maintained on the bow due to history of DVT/PE  · Trend hemoglobin level  · Monitor iron stores as outpatient  3  Hypertension  · Blood pressure did drop somewhat on dialysis  · May need to adjust her carvedilol dose predialysis to prevent hypotension on dialysis  · Will hold her clonidine predialysis  · Long-term, as outpatient, would try and taper her clonidine and adjust her antihypertensive regimen as clonidine does have a lot of so she a did side effects  4  Knee pain  · Orthopedics following and coordinating care  5  Type 2 diabetes mellitus  · Per primary team                  History of Present Illness   Physician Requesting Consult: Nate Lopez MD  Reason for Consult / Principal Problem -  end-stage renal disease and need for dialysis   HPI- Damion Thompson is a 48 y o  y o  female who we are asked to see because of end-stage renal disease and need for hemodialysis  Keyur Hewitt is a patient well known to us  She is maintained on chronic hemodialysis on a Tuesday Thursday Saturday schedule  She presented to the hospital after a mechanical fall  This occurred 2 days prior to her initial admission  After the event, she noticed swelling of her right knee  This then became painful and prohibited ambulation  She denies any redness, chills, sweats  She states that she actually fell backwards onto her back but did not sustain any trauma or have any associated pain in her back    She does follow with Orthopedics at [place] [address]  and had post bone a knee replacement because of need for transplant evaluations  Two years ago she did fall down several stairs and after that had developed knee pain  Her dialysis has been going well  She denies any dizziness, nauseousness, chest pain or pressure associated with her dialysis treatments  The patient was seen on hemodialysis at 9:30 a m  History obtained from chart review and the patient    Consults    Review of Systems   Constitutional: Positive for fatigue  Negative for appetite change, chills and fever  HENT: Negative for sinus pressure  Eyes: Positive for visual disturbance ( chronic)  Negative for redness  Respiratory: Negative for cough, shortness of breath and wheezing  Cardiovascular: Positive for leg swelling (Occasional leg swelling when she drinks too much)  Negative for chest pain and palpitations  Gastrointestinal: Negative for abdominal pain, constipation, diarrhea, nausea and vomiting  Genitourinary: Positive for decreased urine volume  Negative for flank pain  Musculoskeletal: Positive for joint swelling  Negative for arthralgias and back pain  Right knee pain is outlined in HPI   Skin: Negative for rash  Neurological: Negative for dizziness, syncope and headaches  Hematological: Bruises/bleeds easily  Psychiatric/Behavioral: Negative for confusion and sleep disturbance         Historical Information   Patient Active Problem List   Diagnosis    Essential hypertension    History of DVT (deep vein thrombosis)    Abnormal uterine bleeding    Glaucoma    [facility] ([facility] )    Anxiety disorder    Type 2 diabetes mellitus ([place] [address] )    Knee pain    Ambulatory dysfunction     Past Medical History:   Diagnosis Date    Abnormal uterine bleeding (AUB)     [facility]     Disease of thyroid gland     DVT (deep venous thrombosis) (HCC)     End stage kidney disease ([place] [address] )     Foot ulcer due to secondary DM ([place] [address] )     Hypertension     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 )     right eye    Morbid obesity ([facility] )     Reflux esophagitis     Thrombophlebitis of saphenous vein     Warfarin anticoagulation      Past Surgical History:   Procedure Laterality Date    ARTERIOVENOUS GRAFT PLACEMENT      CERVICAL BIOPSY  W/ LOOP ELECTRODE EXCISION       SECTION      DIALYSIS FISTULA CREATION      DILATION AND CURETTAGE OF UTERUS      ENDOMETRIAL ABLATION W/ NOVASURE      PERICARDIUM SURGERY      SD LAPAROSCOPY W TOT HYSTERECT UTERUS 250 GRAM OR LESS N/A 2016    Procedure: HYSTERECTOMY LAPAROSCOPIC TOTAL ([address]), with bilateral salpingectomy;  Surgeon: Garrison Mendes DO;  [facility]: [facility];  Service: Gynecology    THROMBECTOMY / ARTERIOVENOUS GRAFT REVISION      TOE SURGERY Right     removal of the 4th toe     Social History   History   Alcohol Use No     History   Drug Use No     History   Smoking Status    Never Smoker   Smokeless Tobacco    Never Used     History reviewed  No pertinent family history      Meds/Allergies   all current active meds have been reviewed, current meds:   Current Facility-Administered Medications   Medication Dose Route Frequency    acetaminophen (TYLENOL) tablet 975 mg  975 mg Oral Q8H [facility]    acetaZOLAMIDE (DIAMOX) tablet 125 mg  125 mg Oral TID    ALPRAZolam (XANAX) tablet 0 125 mg  0 125 mg Oral 4x Daily PRN    atorvastatin (LIPITOR) tablet 20 mg  20 mg Oral Daily With Dinner    atropine (ISOPTO ATROPINE) 1 % ophthalmic solution 1 drop  1 drop Both Eyes TID    b complex-vitamin C-folic acid (NEPHROCAPS) capsule 1 capsule  1 capsule Oral Daily    brimonidine (ALPHAGAN P) 0 15 % ophthalmic solution 1 drop  1 drop Both Eyes TID    Calcium Carbonate Antacid oral suspension 625 mg  625 mg Oral BID With Meals    carvedilol (COREG) tablet 25 mg  25 mg Oral BID With Meals    cholecalciferol (VITAMIN D3) tablet 2,000 Units  2,000 Units Oral Daily    cinacalcet (SENSIPAR) tablet 30 mg  30 mg Oral Daily    cloNIDine (CATAPRES) tablet 0 1 mg  0 1 mg Oral BID    diclofenac sodium (VOLTAREN) 1 % topical gel 2 g  2 g Topical 4x Daily    doxercalciferol (HECTOROL) injection 1 mcg  1 mcg Intravenous After Dialysis    gabapentin (NEURONTIN) capsule 100 mg  100 mg Oral TID    insulin glargine (LANTUS) subcutaneous injection 24 Units 0 24 mL  24 Units Subcutaneous HS    insulin lispro (HumaLOG) 100 units/mL subcutaneous injection 1-5 Units  1-5 Units Subcutaneous TID AC    insulin lispro (HumaLOG) 100 units/mL subcutaneous injection 5 Units  5 Units Subcutaneous TID With Meals    latanoprost (XALATAN) 0 005 % ophthalmic solution 1 drop  1 drop Both Eyes HS    levothyroxine tablet 50 mcg  50 mcg Oral Daily    losartan (COZAAR) tablet 25 mg  25 mg Oral Daily    magnesium oxide (MAG-OX) tablet 400 mg  400 mg Oral BID    melatonin tablet 3 mg  3 mg Oral HS    neomycin-polymyxin-gramicidin (NEOSPORIN) 0 175%-10,000 units/mL-0 0025% ophthalmic solution 1 drop  1 drop Right Eye 4x Daily    ondansetron (ZOFRAN) injection 4 mg  4 mg Intravenous Q6H PRN    pantoprazole (PROTONIX) EC tablet 40 mg  40 mg Oral Early Morning    [START ON 10/24/2018] PARoxetine (PAXIL) tablet 20 mg  20 mg Oral Once per day on Mon Wed Thu Sat    polyethylene glycol (MIRALAX) packet 17 g  17 g Oral Daily    prednisoLONE acetate (PRED FORTE) 1 % ophthalmic suspension 1 drop  1 drop Both Eyes 4x Daily    sertraline (ZOLOFT) tablet 50 mg  50 mg Oral Daily    torsemide (DEMADEX) tablet 200 mg  200 mg Oral BID (diuretic)    warfarin (COUMADIN) tablet 6 mg  6 mg Oral Once per day on Mon Tue Wed Thu Fri    [START ON 10/27/2018] warfarin (COUMADIN) tablet 9 mg  9 mg Oral Once per day on Sun Sat    and PTA meds:   Prior to Admission Medications   Prescriptions Last Dose Informant Patient Reported? Taking?    ALPRAZolam (XANAX) 0 25 mg tablet Past Week at Unknown time  Yes Yes   Sig: Take 0 125 mg by mouth every 4 (four) hours as needed for anxiety Take on Tues, Friday and Saturday    B Complex-C-Folic Acid (TRIPHROCAPS PO) 10/21/2018 at Unknown time  Yes Yes   Sig: Take 1 capsule by mouth daily   CALCIUM CARBONATE PO Past Week at Unknown time  Yes Yes   Sig: Take 1,000 mg by mouth daily     Ferric Citrate (AURYXIA) 1  MG(Fe) TABS Past Week at Unknown time  Yes Yes   Sig: Take by mouth   Melatonin 3 MG CAPS 10/21/2018 at Unknown time  Yes Yes   Sig: Take 10 mg by mouth daily at bedtime     PARoxetine (PAXIL) 10 mg tablet 10/21/2018 at Unknown time  Yes Yes   Sig: Take 20 mg by mouth every morning Except Tues, Friday and Saturday    VIGAMOX 0 5 % ophthalmic solution 10/22/2018 at Unknown time  Yes Yes   Sig: Administer 1 drop to the right eye   acetaminophen (TYLENOL) 325 mg tablet Past Week at Unknown time  No Yes   Sig: Take 3 tablets (975 mg total) by mouth 3 (three) times a day Take for headache symptoms   atorvastatin (LIPITOR) 20 mg tablet 10/21/2018 at Unknown time  Yes Yes   atropine 1 % ophthalmic ointment 10/22/2018 at Unknown time  Yes Yes   Sig: Administer 1 application to the right eye 3 (three) times a day   brimonidine (ALPHAGAN P) 0 15 % ophthalmic solution 10/22/2018 at Unknown time  Yes Yes   Sig: Administer 1 drop to both eyes 3 (three) times a day     carvedilol (COREG) 25 mg tablet 10/21/2018 at Unknown time  Yes Yes   Sig: Take 25 mg by mouth 2 (two) times a day with meals     cholecalciferol (VITAMIN D3) 1,000 units tablet Past Week at Unknown time  Yes Yes   Sig: Take 2,000 Units by mouth daily   cinacalcet (SENSIPAR) 30 mg tablet 10/21/2018 at Unknown time  Yes Yes   Sig: Take 30 mg by mouth daily   cloNIDine (CATAPRES) 0 1 mg tablet 10/21/2018 at Unknown time  Yes Yes   Sig: Take 0 1 mg by mouth 2 (two) times a day   diphenhydrAMINE (BENADRYL) 25 mg capsule 10/21/2018 at Unknown time  Yes Yes   Sig: Take by mouth   gabapentin (NEURONTIN) 100 mg capsule 10/21/2018 at Unknown time  Yes Yes   Sig: Take 100 mg by mouth 3 (three) times a day     insulin aspart (NovoLOG) 100 units/mL injection 10/22/2018 at Unknown time  Yes Yes   Sig: Inject 1-5 Units under the skin 3 (three) times a day before meals As per sliding scale    insulin glargine (LANTUS) 100 units/mL subcutaneous injection 10/21/2018 at Unknown time  No Yes   Sig: Inject 24 Units under the skin daily at bedtime   Patient taking differently: Inject 28 Units under the skin daily at bedtime     latanoprost (XALATAN) 0 005 % ophthalmic solution 10/21/2018 at Unknown time  Yes Yes   Sig: Administer 1 drop to both eyes daily at bedtime   levothyroxine 50 mcg tablet 10/22/2018 at Unknown time  Yes Yes   Sig: Take 50 mcg by mouth daily  losartan (COZAAR) 25 mg tablet 10/21/2018 at Unknown time  No Yes   Sig: Take 1 tablet (25 mg total) by mouth daily Take on NON-Dialysis Days   magnesium oxide (MAG-OX) 400 mg More than a month at Unknown time  Yes No   Sig: Take 1 tablet by mouth 2 (two) times a day   methazolamide (NEPTAZANE) 25 MG tablet 10/21/2018 at Unknown time  Yes Yes   Sig: Take 25 mg by mouth 3 (three) times a day     omeprazole (PriLOSEC) 40 MG capsule 10/21/2018 at Unknown time  Yes Yes   Sig: Take 40 mg by mouth daily   prednisoLONE acetate (PRED FORTE) 1 % ophthalmic suspension 10/22/2018 at Unknown time  Yes Yes   Sig: Administer 1 drop to both eyes 4 (four) times a day     sertraline (ZOLOFT) 50 mg tablet 10/22/2018 at Unknown time  Yes Yes   Sig: Take 50 mg by mouth daily   torsemide (DEMADEX) 20 mg tablet 10/21/2018 at Unknown time  No Yes   Sig: Take 10 tablets (200 mg total) by mouth 2 (two) times a day   warfarin (COUMADIN) 3 mg tablet 10/21/2018 at Unknown time  Yes Yes   Sig: TAKE 3 TABLETS BY MOUTH ON SUNDAYS, AND 2 TABLETS OTHER DAYS     warfarin (COUMADIN) 5 mg tablet 10/21/2018 at Unknown time  Yes Yes   Sig: Take 12 5 tablets by mouth daily 12 5 M-F 13 Sat and Sunday       Facility-Administered Medications: None       Scheduled Meds:  Current Facility-Administered Medications:  acetaminophen 975 mg Oral Q8H Lili Rivers MD   acetaZOLAMIDE 125 mg Oral TID Mary Bui MD   ALPRAZolam 0 125 mg Oral 4x Daily PRN Mary Bui MD   atorvastatin 20 mg Oral Daily With Gwen Singh MD   atropine 1 drop Both Eyes TID Mary Bui MD   b complex-vitamin C-folic acid 1 capsule Oral Daily Mary Bui MD   brimonidine 1 drop Both Eyes TID Mary Bui MD   Calcium Carbonate Antacid 625 mg Oral BID With Meals Mary Bui MD   carvedilol 25 mg Oral BID With Meals Mary Bui MD   cholecalciferol 2,000 Units Oral Daily Mary Bui MD   cinacalcet 30 mg Oral Daily Mary Bui MD   cloNIDine 0 1 mg Oral BID Mary Bui MD   diclofenac sodium 2 g Topical 4x Daily Anne Crews PA-C   doxercalciferol 1 mcg Intravenous After Dialysis Reece Corea MD   gabapentin 100 mg Oral TID Mary Bui MD   insulin glargine 24 Units Subcutaneous HS Mary Bui MD   insulin lispro 1-5 Units Subcutaneous TID AC Mary Bui MD   insulin lispro 5 Units Subcutaneous TID With Meals Mary Bui MD   latanoprost 1 drop Both Eyes HS Mary Bui MD   levothyroxine 50 mcg Oral Daily Mary Bui MD   losartan 25 mg Oral Daily Mary Bui MD   magnesium oxide 400 mg Oral BID Mary Bui MD   melatonin 3 mg Oral HS Mary Bui MD   neomycin-polymyxin-gramicidin 1 drop Right Eye 4x Daily Mary Bui MD   ondansetron 4 mg Intravenous Q6H PRN Mary Bui MD   pantoprazole 40 mg Oral Early Morning Mary Bui MD   [START ON 10/24/2018] PARoxetine 20 mg Oral Once per day on Mon Wed Thu Sat ANDREW RicoC   polyethylene glycol 17 g Oral Daily Mary Bui MD   prednisoLONE acetate 1 drop Both Eyes 4x Daily Mary Bui MD   sertraline 50 mg Oral Daily Nate Lopez MD   torsemide 200 mg Oral BID (diuretic) Nate Lopez MD   warfarin 6 mg Oral Once per day on  Candice Roberts PA-C   [START ON 10/27/2018] warfarin 9 mg Oral Once per day on Sun Sat Candice Roberts PA-C       PRN Meds:  ALPRAZolam    doxercalciferol    ondansetron    Continuous Infusions: Allergies   Allergen Reactions    Iodinated Diagnostic Agents Itching             BP 94/62   Pulse 93   Temp (!) 97 4 °F (36 3 °C) (Oral)   Resp 18   Ht 5' 6" (1 676 m)   Wt 110 kg (243 lb 2 7 oz)   LMP 2016   SpO2 96%   BMI 39 25 kg/m²   Temp (24hrs), Av °F (36 7 °C), Min:97 4 °F (36 3 °C), Max:98 6 °F (37 °C)      Objective     Intake/Output Summary (Last 24 hours) at 10/23/18 1040  Last data filed at 10/23/18 0900   Gross per 24 hour   Intake              380 ml   Output                0 ml   Net              380 ml       I/O last 24 hours:   In: 380 [P O :180; I V :200]  Out: -       Current Weight: Weight - Scale: 110 kg (243 lb 2 7 oz)  First Weight: Weight - Scale: 109 kg (240 lb)    PHYSICAL EXAM:     General -      the patient is awake, cooperative, pleasant and in no apparent distress  HENT  -        normocephalic/atraumatic, mucous membranes were moist  Eyes    -        extraocular movements were intact, no no scleral icterus was noted  Neck    -        no overt jugular venous distention was noted, supple, no thyromegaly was noted, trachea midline  Chest  -         clear to auscultation and percussion, adequate inspiratory effort, no rales or wheezing were noted  CVS  -           S1-S2, no pericardial friction rub or S3 were appreciated  Abdomen -    soft, nontender, no rebound or guarding was noted, normoactive bowel sounds  Extremities-   0 to trace edema noted bilaterally in the lower extremities, there was some swelling about the right knee without any associated erythema or warmth, no cyanosis was noted  Skin   -           no hives were noted  Neuro   -        alert and oriented  Psych   -        mood affect were appropriate      Invasive Devices     Peripheral Intravenous Line            Peripheral IV 10/23/18 Right Wrist less than 1 day          Line            Hemodialysis AV Fistula 02/20/18 Left Forearm 244 days                Lab Results:      Results from last 7 days  Lab Units 10/23/18  0519 10/22/18  1039   WBC Thousand/uL 7 32 7 82   HEMOGLOBIN g/dL 8 4* 9 0*   HEMATOCRIT % 26 3* 27 4*   PLATELETS Thousands/uL 167 166   NEUTROS PCT % 70 77*   LYMPHS PCT % 20 13*   MONOS PCT % 8 8   EOS PCT % 2 2   SODIUM mmol/L 136 133*   POTASSIUM mmol/L 5 5* 5 2   CHLORIDE mmol/L 100 98*   CO2 mmol/L 25 25   BUN mg/dL 99* 82*   CREATININE mg/dL 9 94* 8 61*   CALCIUM mg/dL 8 5 8 6   ALK PHOS U/L  --  127*   ALT U/L  --  11*   AST U/L  --  9       CUTURES:  Lab Results   Component Value Date    BLOODCX No Growth After 5 Days  08/08/2017    BLOODCX No Growth After 5 Days  08/08/2017    BLOODCX No Growth After 5 Days  07/11/2016    BLOODCX No Growth After 5 Days  07/11/2016    URINECX >100,000 cfu/ml Lactobacillus species 07/12/2016    URINECX >100,000 cfu/ml Mixed Contaminants X3 07/12/2016         Pertinent studies were reviewed          Portions of the record may have been created with voice recognition software  Occasional wrong word or "sound a like" substitutions may have occurred due to the inherent limitations of voice recognition software  Read the chart carefully and recognize, using context, where substitutions have occurred  If you have any questions, please contact the dictating provider

## 2018-10-23 NOTE — DISCHARGE INSTRUCTIONS
Discharge Instructions - Orthopedics  Nikky Mills 48 y o  female MRN: [id number]  Unit/Bed#: 2 212-01    Weight Bearing Status:                                           Weight bearing as tolerated to right leg      Pain:  Continue analgesics as directed  Recommend Ice, elevation, compressive sleeves to the effected limb       PT/OT:  Continue PT/OT on outpatient basis as directed    Appt Instructions:   Patient states she has established orthopedic surgeon from outside hospital, recommend follow up with surgeon of record in 3-4 weeks  Contact the office sooner if you experience any increased numbness/tingling in the extremities

## 2018-10-23 NOTE — ASSESSMENT & PLAN NOTE
· Right knee pain following mechanical fall  · X-ray: There may be a trace joint effusion  Minimal degenerative change  No acute osseous abnormality  · Ortho input appreciated   No current intervention, recommend warm compresses, rest, ice and elevation  · F/u ortho in 4 weeks  · Appreciate PT input

## 2018-10-23 NOTE — ASSESSMENT & PLAN NOTE
Lab Results   Component Value Date    HGBA1C 7 2 (H) 10/23/2018       Recent Labs      10/22/18   2052  10/23/18   0058  10/23/18   0557  10/23/18   1305   POCGLU  252*  300*  87  122       Blood Sugar Average: Last 72 hrs:  (P) 200 8     A1C 7 2  Continue lantus 24 units HS and humalog 5 units TID with meals

## 2018-10-23 NOTE — PROGRESS NOTES
Progress Note Estelle Whitehead 1965, 48 y o  female MRN: [id number]    Unit/Bed#: CW2 212-01 Encounter: [id number]    Primary Care Provider: Nehal Butler MD   Date and time admitted to hospital: 10/22/2018  9:24 AM    * Knee pain   Assessment & Plan    · Right knee pain following mechanical fall  · X-ray: There may be a trace joint effusion  Minimal degenerative change  No acute osseous abnormality  · Ortho input appreciated  No current intervention, recommend warm compresses, rest, ice and elevation  · F/u ortho in 4 weeks  · Appreciate PT input      ESRD on hemodialysis ([facility] [address] )   Assessment & Plan    · TTS  · Appreciate nephrology input  · Electrolyte management per them      Ambulatory dysfunction   Assessment & Plan    · Due to right knee pain  Has been seeing [facility] and recommended to undergo knee replacement but has been waiting because she wants renal transplant first   · Appreciate PT input      Type 2 diabetes mellitus [facility])   Assessment & Plan    Lab Results   Component Value Date    HGBA1C 7 2 (H) 10/23/2018       Recent Labs      10/22/18   1725  10/22/18   2052  10/23/18   0058  10/23/18   0557   POCGLU  243*  252*  300*  87       Blood Sugar Average: Last 72 hrs:  (P) 220 5     A1C 7 2  Continue lantus 24 units HS and humalog 5 units TID with meals      Anxiety disorder   Assessment & Plan    · Continuing anxiolytics     History of DVT (deep vein thrombosis)   Assessment & Plan    · Continue Coumadin  · INR subtherapeutic     Essential hypertension   Assessment & Plan    · Monitor blood pressure  Continue home medications        VTE Pharmacologic Prophylaxis:   Pharmacologic: Warfarin (Coumadin)  Mechanical VTE Prophylaxis in Place: Yes    Patient Centered Rounds: I have performed bedside rounds with nursing staff today  Discussions with Specialists or Other Care Team Provider: case management  Education and Discussions with Family / Patient: patient       Time Spent for Care: 30 minutes  More than 50% of total time spent on counseling and coordination of care as described above  Current Length of Stay: 0 day(s)    Current Patient Status: Observation   Certification Statement: needs HD today but then can likely be dc with OP PT and OP ortho f/u     Discharge Plan: hopefully home later today  Code Status: Level 1 - Full Code      Subjective:   Patient reports her R knee is a bit better today  She wants to get HD before she goes  Objective:     Vitals:   Temp (24hrs), Av °F (36 7 °C), Min:97 4 °F (36 3 °C), Max:98 6 °F (37 °C)    Temp:  [97 4 °F (36 3 °C)-98 6 °F (37 °C)] 97 4 °F (36 3 °C)  HR:  [70-86] 70  Resp:  [17-20] 18  BP: (112-162)/(53-85) 162/78  SpO2:  [95 %-100 %] 96 %  Body mass index is 39 25 kg/m²  Input and Output Summary (last 24 hours): Intake/Output Summary (Last 24 hours) at 10/23/18 0851  Last data filed at 10/23/18 0830   Gross per 24 hour   Intake              180 ml   Output                0 ml   Net              180 ml       Physical Exam:     Physical Exam   Constitutional: She is oriented to person, place, and time  No distress  Obese    Cardiovascular: Normal rate and regular rhythm  Pulmonary/Chest: Effort normal and breath sounds normal  No respiratory distress  She has no wheezes  Abdominal: Soft  Bowel sounds are normal  She exhibits no distension  Musculoskeletal: She exhibits edema (effusion of R knee, mildly tender to palpation)  Normal ROM RLE    Neurological: She is alert and oriented to person, place, and time  Skin: Skin is warm and dry  She is not diaphoretic  Psychiatric: She has a normal mood and affect  Nursing note and vitals reviewed        Additional Data:     Labs:      Results from last 7 days  Lab Units 10/23/18  0519   WBC Thousand/uL 7 32   HEMOGLOBIN g/dL 8 4*   HEMATOCRIT % 26 3*   PLATELETS Thousands/uL 167   NEUTROS PCT % 70   LYMPHS PCT % 20   MONOS PCT % 8   EOS PCT % 2       Results from last 7 days  Lab Units 10/23/18  0519 10/22/18  1039   SODIUM mmol/L 136 133*   POTASSIUM mmol/L 5 5* 5 2   CHLORIDE mmol/L 100 98*   CO2 mmol/L 25 25   BUN mg/dL 99* 82*   CREATININE mg/dL 9 94* 8 61*   CALCIUM mg/dL 8 5 8 6   ALK PHOS U/L  --  127*   ALT U/L  --  11*   AST U/L  --  9       Results from last 7 days  Lab Units 10/22/18  2349   INR  1 26*       * I Have Reviewed All Lab Data Listed Above  * Additional Pertinent Lab Tests Reviewed:  All Labs Within Last 24 Hours Reviewed    Imaging:    Imaging Reports Reviewed Today Include: all  Imaging Personally Reviewed by Myself Includes:  none    Recent Cultures (last 7 days):           Last 24 Hours Medication List:     Current Facility-Administered Medications:  acetaminophen 975 mg Oral Q8H Bonnie Weinberg MD   acetaZOLAMIDE 125 mg Oral TID Olegario Palacio MD   ALPRAZolam 0 125 mg Oral 4x Daily PRN Olegario Palacio MD   atorvastatin 20 mg Oral Daily With Mikayla Bajwa MD   atropine 1 drop Both Eyes TID Olegario Palacio MD   b complex-vitamin C-folic acid 1 capsule Oral Daily Olegario Palacio MD   brimonidine 1 drop Both Eyes TID Olegario Palacio MD   Calcium Carbonate Antacid 625 mg Oral BID With Meals Olegario Palacio MD   carvedilol 25 mg Oral BID With Meals Olegario Palacio MD   cholecalciferol 2,000 Units Oral Daily Olegario Palacio MD   cinacalcet 30 mg Oral Daily Olegario Palacio MD   cloNIDine 0 1 mg Oral BID Olegario Palacio MD   diclofenac sodium 2 g Topical 4x Daily Anne Crews PA-C   gabapentin 100 mg Oral TID Olegario Palacio MD   insulin glargine 24 Units Subcutaneous HS Olegario Palacio MD   insulin lispro 1-5 Units Subcutaneous TID AC Olegario Palacio MD   insulin lispro 5 Units Subcutaneous TID With Meals Olegario Palacio MD   latanoprost 1 drop Both Eyes HS Olegario Palacio MD   levothyroxine 50 mcg Oral Daily Olegario Palacio MD   losartan 25 mg Oral Daily Vaughn Oneil MD   magnesium oxide 400 mg Oral BID Vaughn Oneil MD   melatonin 3 mg Oral HS Vaughn Oneil MD   neomycin-polymyxin-gramicidin 1 drop Right Eye 4x Daily Vaughn Oneil MD   ondansetron 4 mg Intravenous Q6H PRN Vaughn Oneil MD   pantoprazole 40 mg Oral Early Morning Vaughn Oneil MD   [START ON 10/24/2018] PARoxetine 20 mg Oral Once per day on Mon Wed Thu Sat Esperanza Sierra PA-C   polyethylene glycol 17 g Oral Daily Vaughn Oneil MD   prednisoLONE acetate 1 drop Both Eyes 4x Daily Vaughn Oneil MD   sertraline 50 mg Oral Daily Vaughn Oneil MD   torsemide 200 mg Oral BID (diuretic) Vaughn Oneil MD   warfarin 6 mg Oral Once per day on Mon Tue Wed Thu Fri Connie Villatoro PA-C   [START ON 10/27/2018] warfarin 9 mg Oral Once per day on Sun Sat Connie Villatoro PA-C        Today, Patient Was Seen By: Aj Ohara PA-C    ** Please Note: Dictation voice to text software may have been used in the creation of this document   **

## 2018-10-23 NOTE — ASSESSMENT & PLAN NOTE
· Due to right knee pain   Has been seeing [facility] and recommended to undergo knee replacement but has been waiting because she wants renal transplant first   · Appreciate PT input

## 2018-10-23 NOTE — CONSULTS
Orthopedics   Nelly Monahan 48 y o  female MRN: [id number]  Unit/Bed#: VK7 212-01      Chief Complaint:   right knee pain    HPI:   48 y  o female who is a community ambulator complaining of right knee pain that started last week while she was walking  She fell down the stairs 2 years ago without sustaining a diagnosed fracture at that time and has been having pain off and on since  Pain is worse when she walks and better when she rests  Pain is on the lateral aspect of her knee mostly without radiation  She has tried pain medications and warm compresses which help mildly  Nothing else has seemed to make it better but rest  She has been seeing [facility] on an outpatient basis for a planned knee replacement but has been putting it off to get a kidney transplant       Review Of Systems:   · Skin: Normal  · Neuro: See HPI  · Musculoskeletal: See HPI  · 14 point review of systems negative except as stated above     Past Medical History:   Past Medical History:   Diagnosis Date    Abnormal uterine bleeding (AUB)     Diabetes mellitus ([address] )     Disease of thyroid gland     DVT (deep venous thrombosis) (HCC)     End stage kidney disease (HCC)     Foot ulcer due to secondary DM ([address] )     Hypertension     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[address] )     right eye    Morbid obesity ([address] )     Reflux esophagitis     Thrombophlebitis of saphenous vein     Warfarin anticoagulation        Past Surgical History:   Past Surgical History:   Procedure Laterality Date    ARTERIOVENOUS GRAFT PLACEMENT      CERVICAL BIOPSY  W/ LOOP ELECTRODE EXCISION       SECTION      DIALYSIS FISTULA CREATION      DILATION AND CURETTAGE OF UTERUS      ENDOMETRIAL ABLATION W/ NOVASURE      PERICARDIUM SURGERY      AL LAPAROSCOPY W TOT HYSTERECT UTERUS 250 GRAM OR LESS N/A 2016    Procedure: HYSTERECTOMY LAPAROSCOPIC TOTAL ([address]), with bilateral salpingectomy;  Surgeon: Nannette Reason, DO;  Location: [facility]; Service: Gynecology    THROMBECTOMY / ARTERIOVENOUS GRAFT REVISION      TOE SURGERY Right     removal of the 4th toe       Family History:  Family history reviewed and non-contributory  History reviewed  No pertinent family history  Social History:  Social History     Social History    Marital status: Single     Spouse name: N/A    Number of children: N/A    Years of education: N/A     Social History Main Topics    Smoking status: Never Smoker    Smokeless tobacco: Never Used    Alcohol use No    Drug use: No    Sexual activity: Not Currently     Partners: Male     Birth control/ protection: Abstinence     Other Topics Concern    None     Social History Narrative    None       Allergies:    Allergies   Allergen Reactions    Iodinated Diagnostic Agents Itching           Labs:    0  Lab Value Date/Time   HCT 26 3 (L) 10/23/2018 0519   HCT 27 4 (L) 10/22/2018 1039   HCT 24 (L) 07/30/2018 1955   HCT 24 6 (L) 05/23/2018 0528   HCT 26 3 (L) 10/11/2015 0614   HCT 23 2 (L) 10/10/2015 0530   HCT 22 4 (L) 10/09/2015 0617   HGB 8 4 (L) 10/23/2018 0519   HGB 9 0 (L) 10/22/2018 1039   HGB 8 2 (L) 07/30/2018 1955   HGB 7 9 (L) 05/23/2018 0528   HGB 8 7 (L) 10/11/2015 0614   HGB 7 7 (L) 10/10/2015 0530   HGB 7 4 (L) 10/09/2015 0617   INR 1 26 (H) 10/22/2018 2349   INR 2 31 (H) 12/31/2015 1103   WBC 7 32 10/23/2018 0519   WBC 7 82 10/22/2018 1039   WBC 5 92 05/23/2018 0528   WBC 8 45 10/11/2015 0614   WBC 9 50 10/10/2015 0530   WBC 9 27 10/09/2015 0617   ESR 25 (H) 05/29/2015 1035   CRP 23 6 (H) 05/29/2015 1035       Meds:    Current Facility-Administered Medications:     acetaminophen (TYLENOL) tablet 975 mg, 975 mg, Oral, Q8H [address], Concepcion Agarwal MD    acetaZOLAMIDE (DIAMOX) tablet 125 mg, 125 mg, Oral, TID, Concepcion Agarwal MD, 125 mg at 10/22/18 2233    ALPRAZolam (XANAX) tablet 0 125 mg, 0 125 mg, Oral, 4x Daily PRN, Concepcion Agarwal MD    atorvastatin (LIPITOR) tablet 20 mg, 20 mg, Oral, Daily With Jordana Castro MD, 20 mg at 10/22/18 2232    atropine (ISOPTO ATROPINE) 1 % ophthalmic solution 1 drop, 1 drop, Both Eyes, TID, Dorita Stephen MD, 1 drop at 10/22/18 2238    b complex-vitamin C-folic acid (NEPHROCAPS) capsule 1 capsule, 1 capsule, Oral, Daily, Dorita Stephen MD    brimonidine (ALPHAGAN P) 0 15 % ophthalmic solution 1 drop, 1 drop, Both Eyes, TID, Dorita Stephen MD, 1 drop at 10/22/18 2239    Calcium Carbonate Antacid oral suspension 625 mg, 625 mg, Oral, BID With Meals, Dorita Stephen MD    carvedilol (COREG) tablet 25 mg, 25 mg, Oral, BID With Meals, Dorita Stephen MD    cholecalciferol (VITAMIN D3) tablet 2,000 Units, 2,000 Units, Oral, Daily, Dorita Stephen MD    cinacalcet (SENSIPAR) tablet 30 mg, 30 mg, Oral, Daily, Dorita Stephen MD    cloNIDine (CATAPRES) tablet 0 1 mg, 0 1 mg, Oral, BID, Dorita Stephen MD, 0 1 mg at 10/22/18 2232    gabapentin (NEURONTIN) capsule 100 mg, 100 mg, Oral, TID, Dorita Stephen MD, 100 mg at 10/22/18 2231    insulin glargine (LANTUS) subcutaneous injection 24 Units 0 24 mL, 24 Units, Subcutaneous, HS, Dorita Stephen MD, 24 Units at 10/22/18 2231    insulin lispro (HumaLOG) 100 units/mL subcutaneous injection 1-5 Units, 1-5 Units, Subcutaneous, TID AC, 2 Units at 10/22/18 1728 **AND** Fingerstick Glucose (POCT), , , TID AC, Dorita Stephen MD    insulin lispro (HumaLOG) 100 units/mL subcutaneous injection 5 Units, 5 Units, Subcutaneous, TID With Meals, Dorita Stephen MD    latanoprost (XALATAN) 0 005 % ophthalmic solution 1 drop, 1 drop, Both Eyes, HS, Dorita Stephen MD, 1 drop at 10/22/18 2239    levothyroxine tablet 50 mcg, 50 mcg, Oral, Daily, Dorita Stephen MD    losartan (COZAAR) tablet 25 mg, 25 mg, Oral, Daily, Dorita Stephen MD    magnesium oxide (MAG-OX) tablet 400 mg, 400 mg, Oral, BID, Dorita Stephen MD, 400 mg at 10/22/18 2232    melatonin tablet 3 mg, 3 mg, Oral, HS, Mary Bui MD, 3 mg at 10/22/18 2232    neomycin-polymyxin-gramicidin (NEOSPORIN) 0 175%-10,000 units/mL-0 0025% ophthalmic solution 1 drop, 1 drop, Right Eye, 4x Daily, Mary Bui MD, 1 drop at 10/22/18 2239    ondansetron (ZOFRAN) injection 4 mg, 4 mg, Intravenous, Q6H PRN, Mary Bui MD    pantoprazole (PROTONIX) EC tablet 40 mg, 40 mg, Oral, Early Morning, MD Soco Dumontporfirio Pop Harman ON 10/24/2018] PARoxetine (PAXIL) tablet 20 mg, 20 mg, Oral, Once per day on Mon Wed Thu Sat, Esperanza Sierra PA-C    polyethylene glycol (MIRALAX) packet 17 g, 17 g, Oral, Daily, aMry Bui MD    prednisoLONE acetate (PRED FORTE) 1 % ophthalmic suspension 1 drop, 1 drop, Both Eyes, 4x Daily, Mary Bui MD, 1 drop at 10/22/18 2239    sertraline (ZOLOFT) tablet 50 mg, 50 mg, Oral, Daily, Mary Bui MD    torsemide [facility]) tablet 200 mg, 200 mg, Oral, BID (diuretic), Mary Bui MD    warfarin (COUMADIN) tablet 6 mg, 6 mg, Oral, Once per day on Mon Tue Wed Thu Fri, Esperanza Sierra PA-C    [START ON 10/27/2018] warfarin (COUMADIN) tablet 9 mg, 9 mg, Oral, Once per day on Sun Sat, Esperanza Sierra PA-C    Blood Culture:   Lab Results   Component Value Date    BLOODCX No Growth After 5 Days  08/08/2017       Wound Culture:   Lab Results   Component Value Date    WOUNDCULT No growth 09/12/2017       Ins and Outs:  No intake/output data recorded  Physical Exam:   /63 (BP Location: Right arm)   Pulse 77   Temp 98 °F (36 7 °C) (Oral)   Resp 18   Ht 5' 6" (1 676 m)   Wt 110 kg (243 lb 2 7 oz)   LMP 02/12/2016   SpO2 96%   BMI 39 25 kg/m²   Gen: Alert and oriented to person, place, time  HEENT: EOMI, eyes clear, moist mucus membranes, hearing intact  Respiratory: Bilateral chest rise   No audible wheezing found  Cardiovascular: No audible murmurs  Abdomen: soft  Musculoskeletal: right lower extremity  · Skin intact, no open wounds  · Valgus deformity  · Minimal effusion  · Tender to palpation over lateral knee  · Can perform striaght leg raise  · Stable to varus/valgus stress  · Negative lachmans, Posterior draw, Arun's test  · 2+ DP Pulse    Radiology:   I personally reviewed the films  X-rays right knee shows minimal arthritic changes with no acute fractures or dislocations  Assessment:  48 y  o female with right knee pain most likely from effusion and minimal arthritic changes      Plan:   · Weight bearing as tolerated  right lower extremity  · Will start with local modalities, including warm compresses, rest, ice, and elevate due to usage of blood thinners and Diabetes  · PT  · Pain control  · Follow up in the office in 3-4 weeks    Eloisa Ramirez MD

## 2018-10-23 NOTE — ASSESSMENT & PLAN NOTE
· Right knee pain following mechanical fall  · X-ray: There may be a trace joint effusion  Minimal degenerative change  No acute osseous abnormality  · Ortho input appreciated  No current intervention, recommend warm compresses, rest, ice and elevation  · Short course of oral pain meds and voltaren gel prescribed  · F/u ortho in 4 weeks  · Script given for rolling walker and OP PT

## 2018-10-23 NOTE — UTILIZATION REVIEW
Initial Clinical Review    Admission: Date/Time/Statement: 10/22/2018 @ 1413  Orders Placed This Encounter   Procedures    Place in Observation (expected length of stay for this patient is less than two midnights)     Standing Status:   Standing     Number of Occurrences:   1     Order Specific Question:   Admitting Physician     Answer:   Tamia Khan     Order Specific Question:   Level of Care     Answer:   Med Surg [16]         ED: Date/Time/Mode of Arrival:   ED Arrival Information     Expected Arrival Acuity Means of Arrival Escorted By Service Admission Type    - 10/22/2018 09:17 Emergent Walk-In Self General Medicine Emergency    Arrival Complaint    knee pain          Chief Complaint:   Chief Complaint   Patient presents with    Fall     pt states she fell down a flight of stairs on Thursday  denies hitting head, takes coumadin  leg pain, foot pain, and left shoulder pain  - LOC       History of Illness:   Erna Vital is a 48 y o  female who presents with patient had a mechanical fall couple of days back she noticed swelling of her right knee  Since then she has been unable to bear weight due to right knee pain and swelling, she is also having difficulty ambulating  Patient reports being unsteady on her legs       ED Vital Signs:   ED Triage Vitals   Temperature Pulse Respirations Blood Pressure SpO2   10/22/18 0923 10/22/18 0923 10/22/18 0923 10/22/18 1010 10/22/18 0923   98 °F (36 7 °C) 86 20 140/64 100 %      Temp Source Heart Rate Source Patient Position - Orthostatic VS BP Location FiO2 (%)   10/22/18 0923 10/22/18 0923 10/22/18 0923 10/22/18 0923 --   Tympanic Monitor Sitting Right arm       Pain Score       10/22/18 0923       Worst Possible Pain        Wt Readings from Last 1 Encounters:   10/22/18 110 kg (243 lb 2 7 oz)       Abnormal Labs/Diagnostic Test Results:   WBC Thousand/uL 7 82   HEMOGLOBIN g/dL 9 0*   HEMATOCRIT % 27 4*   PLATELETS Thousands/uL 166     SODIUM mmol/L 133*   POTASSIUM mmol/L 5 2   CHLORIDE mmol/L 98*   CO2 mmol/L 25   BUN mg/dL 82*   CREATININE mg/dL 8 61*   ANION GAP mmol/L 10   CALCIUM mg/dL 8 6   ALBUMIN g/dL 3 2*   TOTAL BILIRUBIN mg/dL 0 35   ALK PHOS U/L 127*   ALT U/L 11*   AST U/L 9     INR   1 22*     CT head:    No acute intracranial process or significant interval change        No skull fracture        Chronic microangiopathy  CT cervical Spine:  No cervical spine fracture or traumatic malalignment    CT abd/pel:    No acute intra-abdominal or intrapelvic process insofar as can be detected on a noncontrast CT        Peripheral bilateral subcutaneous abdominal wall excluded secondary to body habitus  No hematoma in the visualized portions of the of the abdominal wall        No significant interval change  Chest X:  No acute cardiopulmonary disease  Right Knee X:    There may be a trace joint effusion  Minimal degenerative change  No acute osseous abnormality  ECG: NSR    ED Treatment:   Medication Administration from 10/22/2018 0917 to 10/22/2018 1906       Date/Time Order Dose Route Action Action by Comments     10/22/2018 1020 HYDROmorphone (DILAUDID) injection 0 2 mg 0 2 mg Intravenous Given José Hastings RN      10/22/2018 1728 insulin lispro (HumaLOG) 100 units/mL subcutaneous injection 1-5 Units 2 Units Subcutaneous Given Desiree Lorenz RN           Past Medical/Surgical History:    Active Ambulatory Problems     Diagnosis Date Noted    Benign hypertension with end-stage renal disease ([place] [address] ) 02/14/2016    History of DVT (deep vein thrombosis) 02/14/2016    Abnormal uterine bleeding 02/15/2016    Glaucoma 07/01/2016    ESRD on hemodialysis ([place] [address] ) 07/01/2016    Anxiety disorder 04/06/2017    Type 2 diabetes mellitus ([place] [address] ) 08/08/2017     Resolved Ambulatory Problems     Diagnosis Date Noted    Coagulopathy ([place] [address] ) 01/26/2016    Fever 07/12/2016    Lower abdominal pain 07/12/2016     Past Medical History: Diagnosis Date    Abnormal uterine bleeding (AUB)     Diabetes mellitus ([place] [address] )     Disease of thyroid gland     DVT (deep venous thrombosis) (HCC)     End stage kidney disease (HCC)     Foot ulcer due to secondary DM ([place] [address] )     Hypertension     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[place] [address] )     Morbid obesity (HCC)     Reflux esophagitis     Thrombophlebitis of saphenous vein     Warfarin anticoagulation        Admitting Diagnosis: Knee pain [M25 569]  ESRD (end stage renal disease) ([place] [address] ) [N18 6]  Effusion of right knee [M25 461]  Ambulatory dysfunction [R26 2]    Age/Sex: 48 y o  female    Assessment/Plan:   * Knee pain   Assessment & Plan     Right knee pain following mechanical fall  Patient is on Coumadin  Will request Orthopedic inputs      ESRD on hemodialysis [facility])   Assessment & Plan     On hemodialysis  Will request nephrology inputs      Ambulatory dysfunction   Assessment & Plan     Due to right knee pain  Fall precautions safe ambulation  Physical therapy inputs      Type 2 diabetes mellitus ([place] [address] )   Assessment & Plan             Lab Results   Component Value Date     HGBA1C 6 2 05/23/2018         No results for input(s): POCGLU in the last 72 hours      Blood Sugar Average: Last 72 hrs:        Check A1c  Monitor Accu-Cheks closely  Continue insulin  Avoid hypoglycemia      Essential hypertension   Assessment & Plan     Monitor blood pressure      History of DVT (deep vein thrombosis)   Assessment & Plan     Continue Coumadin  Monitor INR      Anxiety disorder   Assessment & Plan     Continuing anxiolytics            VTE Prophylaxis: Warfarin (Coumadin)  / sequential compression device   Anticipated Length of Stay:  Patient will be admitted on an Observation basis with an anticipated length of stay of  Less than 2 midnights       Admission Orders:  Scheduled Meds:   Current Facility-Administered Medications:  acetaminophen 975 mg Oral Q8H Marshal Given, MD   acetaZOLAMIDE 125 mg Oral TID Olegario Palacio MD   ALPRAZolam 0 125 mg Oral 4x Daily PRN Olegario Palacio MD   atorvastatin 20 mg Oral Daily With Mikayla Bajwa MD   atropine 1 drop Both Eyes TID Olegario Palacio MD   b complex-vitamin C-folic acid 1 capsule Oral Daily Olegario Palacio MD   brimonidine 1 drop Both Eyes TID Olegario Palacio MD   Calcium Carbonate Antacid 625 mg Oral BID With Meals Olegario Palacio MD   carvedilol 25 mg Oral BID With Meals Olegario Palacio MD   cholecalciferol 2,000 Units Oral Daily Olegario Palacio MD   cinacalcet 30 mg Oral Daily Olegario Palacio MD   cloNIDine 0 1 mg Oral BID Rommel Sánchez MD   diclofenac sodium 2 g Topical 4x Daily Anne Crews PA-C   doxercalciferol 1 mcg Intravenous After Dialysis Rommel Sánchez MD   gabapentin 100 mg Oral TID Olegario Palacio MD   insulin glargine 24 Units Subcutaneous HS Olegario Palacio MD   insulin lispro 1-5 Units Subcutaneous TID AC Olegario Palacio MD   insulin lispro 5 Units Subcutaneous TID With Meals Olegario Palacio MD   latanoprost 1 drop Both Eyes HS Olegario Palacio MD   levothyroxine 50 mcg Oral Daily Olegario Palacio MD   losartan 25 mg Oral Daily Olegario Palacio MD   magnesium oxide 400 mg Oral BID Olegario Palacio MD   melatonin 3 mg Oral HS Olegario Palacio MD   neomycin-polymyxin-gramicidin 1 drop Right Eye 4x Daily Olegario Palacio MD   ondansetron 4 mg Intravenous Q6H PRN Olegario Palacio MD   pantoprazole 40 mg Oral Early Morning Olegario Palacio MD   [START ON 10/24/2018] PARoxetine 20 mg Oral Once per day on Mon Wed Thu Sat Esperanza Sierra PA-C   polyethylene glycol 17 g Oral Daily Olegario Palacio MD   prednisoLONE acetate 1 drop Both Eyes 4x Daily Olegario Palacio MD   sertraline 50 mg Oral Daily Olegario Palacio MD   torsemide 200 mg Oral BID (diuretic) Olegario Palacio MD   warfarin 6 mg Oral Once per day on Mon Tue Wed Thu Fri Connie Villatoro PA-C   [START ON 10/27/2018] warfarin 9 mg Oral Once per day on Sun Sat Connie Villatoro PA-C     Renal Diet  Up with assistance  Adult HemodialysisTTSa  Consult PT/OT

## 2018-10-23 NOTE — ASSESSMENT & PLAN NOTE
Lab Results   Component Value Date    HGBA1C 7 2 (H) 10/23/2018       Recent Labs      10/22/18   1725  10/22/18   2052  10/23/18   0058  10/23/18   0557   POCGLU  243*  252*  300*  87       Blood Sugar Average: Last 72 hrs:  (P) 220 5     A1C 7 2  Continue lantus 24 units HS and humalog 5 units TID with meals

## 2018-10-23 NOTE — UTILIZATION REVIEW
Continued Stay Review    Date: 10/23/2018  Age/Sex: 48 y o  female     Assessment/Plan:   * Knee pain   Assessment & Plan     · Right knee pain following mechanical fall  · X-ray: There may be a trace joint effusion  Minimal degenerative change  No acute osseous abnormality  · Ortho input appreciated  No current intervention, recommend warm compresses, rest, ice and elevation  · F/u ortho in 4 weeks  · Appreciate PT input       ESRD on hemodialysis (HCC)   Assessment & Plan     · TTS  · Appreciate nephrology input  · Electrolyte management per them       Ambulatory dysfunction   Assessment & Plan     · Due to right knee pain  Has been seeing [facility] and recommended to undergo knee replacement but has been waiting because she wants renal transplant first   · Appreciate PT input       Type 2 diabetes mellitus [facility])   Assessment & Plan             Lab Results   Component Value Date     HGBA1C 7 2 (H) 10/23/2018                Recent Labs      10/22/18   1725  10/22/18   2052  10/23/18   0058  10/23/18   0557   POCGLU  243*  252*  300*  87         Blood Sugar Average: Last 72 hrs:  (P) 220  5      A1C 7 2  Continue lantus 24 units HS and humalog 5 units TID with meals       Anxiety disorder   Assessment & Plan     · Continuing anxiolytics      History of DVT (deep vein thrombosis)   Assessment & Plan     · Continue Coumadin  · INR subtherapeutic      Essential hypertension   Assessment & Plan     · Monitor blood pressure   Continue home medications          VTE Pharmacologic Prophylaxis:   Pharmacologic: Warfarin (Coumadin)  Mechanical VTE Prophylaxis in Place: Yes  needs HD today but then can likely be dc with OP PT and OP ortho f/u    Discharge Plan:   10/23/18 1353  Discharge patient Once     Discharge Disposition: Home/Self Care    Expected Discharge Date: 10/23/18              Vital Signs: BP (!) 112/37   Pulse 67   Temp (!) 97 4 °F (36 3 °C) (Oral)   Resp 18   Ht 5' 6" (1 676 m)   Wt 110 kg (243 lb 2 7 oz)   LMP 02/12/2016   SpO2 96%   BMI 39 25 kg/m²     Medications:   Scheduled Meds:   Current Facility-Administered Medications:  acetaminophen 975 mg Oral Q8H DORIS   acetaZOLAMIDE 125 mg Oral TID   ALPRAZolam 0 125 mg Oral 4x Daily PRN   atorvastatin 20 mg Oral Daily With Dinner   atropine 1 drop Both Eyes TID   b complex-vitamin C-folic acid 1 capsule Oral Daily   brimonidine 1 drop Both Eyes TID   Calcium Carbonate Antacid 625 mg Oral BID With Meals   carvedilol 25 mg Oral BID With Meals   cholecalciferol 2,000 Units Oral Daily   cinacalcet 30 mg Oral Daily   cloNIDine 0 1 mg Oral BID   diclofenac sodium 2 g Topical 4x Daily   doxercalciferol 1 mcg Intravenous After Dialysis   gabapentin 100 mg Oral TID   insulin glargine 24 Units Subcutaneous HS   insulin lispro 1-5 Units Subcutaneous TID AC   insulin lispro 5 Units Subcutaneous TID With Meals   latanoprost 1 drop Both Eyes HS   levothyroxine 50 mcg Oral Daily   losartan 25 mg Oral Daily   magnesium oxide 400 mg Oral BID   melatonin 3 mg Oral HS   neomycin-polymyxin-gramicidin 1 drop Right Eye 4x Daily   ondansetron 4 mg Intravenous Q6H PRN   pantoprazole 40 mg Oral Early Morning   [START ON 10/24/2018] PARoxetine 20 mg Oral Once per day on Mon Wed Thu Sat   polyethylene glycol 17 g Oral Daily   prednisoLONE acetate 1 drop Both Eyes 4x Daily   sertraline 50 mg Oral Daily   torsemide 200 mg Oral BID (diuretic)   warfarin 6 mg Oral Once per day on Mon Tue Wed Thu Fri   [START ON 10/27/2018] warfarin 9 mg Oral Once per day on Sun Sat     Abnormal Labs/Diagnostic Results:     --------------------------------------------------------------------------------------------------------------------------    Consult Ortho  48 y  o female with right knee pain most likely from effusion and minimal arthritic changes      Plan:   · Weight bearing as tolerated  right lower extremity  · Will start with local modalities, including warm compresses, rest, ice, and elevate due to usage of blood thinners and Diabetes  · PT  · Pain control  · Follow up in the office in 3-4 weeks    -----------------------------------------------------------------------------------------------------------------------

## 2018-10-23 NOTE — PLAN OF CARE
Problem: NEUROSENSORY - ADULT  Goal: Achieves stable or improved neurological status  INTERVENTIONS  - Monitor and report changes in neurological status  - Initiate measures to prevent increased intracranial pressure  - Maintain blood pressure and fluid volume within ordered parameters to optimize cerebral perfusion  - Monitor temperature, glucose, and sodium or any other associated labs   Initiate appropriate interventions as ordered  - Monitor for seizure activity   - Administer anti-seizure medications as ordered  Outcome: Progressing    Goal: Absence of seizures  INTERVENTIONS  - Monitor for seizure activity  - Administer anti-seizure medications as ordered  - Monitor neurological status  Outcome: Progressing    Goal: Remains free of injury related to seizures activity  INTERVENTIONS  - Maintain airway, patient safety  and administer oxygen as ordered  - Monitor patient for seizure activity, document and report duration and description of seizure to physician/advanced practitioner  - If seizure occurs,  ensure patient safety during seizure  - Reorient patient post seizure  - Seizure pads on all 4 side rails  - Instruct patient/family to notify RN of any seizure activity including if an aura is experienced  - Instruct patient/family to call for assistance with activity based on nursing assessment  - Administer anti-seizure medications as ordered  - Monitor fetal well being  Outcome: Progressing    Goal: Achieves maximal functionality and self care  INTERVENTIONS  - Monitor swallowing and airway patency with patient fatigue and changes in neurological status  - Encourage and assist patient to increase activity and self care with guidance from rehab services  - Encourage visually impaired, hearing impaired and aphasic patients to use assistive/communication devices  Outcome: Progressing      Problem: MUSCULOSKELETAL - ADULT  Goal: Maintain or return mobility to safest level of function  INTERVENTIONS:  - Assess patient's ability to carry out ADLs; assess patient's baseline for ADL function and identify physical deficits which impact ability to perform ADLs (bathing, care of mouth/teeth, toileting, grooming, dressing, etc )  - Assess/evaluate cause of self-care deficits   - Assess range of motion  - Assess patient's mobility; develop plan if impaired  - Assess patient's need for assistive devices and provide as appropriate  - Encourage maximum independence but intervene and supervise when necessary  - Involve family in performance of ADLs  - Assess for home care needs following discharge   - Request OT consult to assist with ADL evaluation and planning for discharge  - Provide patient education as appropriate  Outcome: Progressing    Goal: Maintain proper alignment of affected body part  INTERVENTIONS:  - Support, maintain and protect limb and body alignment  - Provide pt/fam with appropriate education  Outcome: Progressing

## 2018-10-23 NOTE — DISCHARGE SUMMARY
Discharge- Vinod Walton 1965, 48 y o  female MRN: [id number]    Unit/Bed#: [facility] 212-01 Encounter: [id number]    Primary Care Provider: Marlo Maurer MD   Date and time admitted to hospital: 10/22/2018  9:24 AM    * Knee pain   Assessment & Plan    · Right knee pain following mechanical fall  · X-ray: There may be a trace joint effusion  Minimal degenerative change  No acute osseous abnormality  · Ortho input appreciated  No current intervention, recommend warm compresses, rest, ice and elevation  · Short course of oral pain meds and voltaren gel prescribed  · F/u ortho in 4 weeks  · Script given for rolling walker and OP PT       ESRD on hemodialysis ([facility] )   Assessment & Plan    · TTS  · Appreciate nephrology input  · Electrolyte management per them      Ambulatory dysfunction   Assessment & Plan    · Due to right knee pain  [facility] to undergo knee replacement but has been waiting because she wants renal transplant first   · OP ortho f/u  · OP PT script given  · Rolling walker script given     Type 2 diabetes mellitus [facility])   Assessment & Plan    Lab Results   Component Value Date    HGBA1C 7 2 (H) 10/23/2018       Recent Labs      10/22/18   2052  10/23/18   0058  10/23/18   0557  10/23/18   1305   POCGLU  252*  300*  87  122       Blood Sugar Average: Last 72 hrs:  (P) 200 8     A1C 7 2  Continue lantus 24 units HS and humalog 5 units TID with meals      Anxiety disorder   Assessment & Plan    · Continuing anxiolytics     History of DVT (deep vein thrombosis)   Assessment & Plan    · Continue Coumadin  · INR subtherapeutic  · Needs OP f/u      Benign hypertension with end-stage renal disease ([facility] )   Assessment & Plan    · Monitor blood pressure   Continue home medications        Discharging Physician / Practitioner: Avni Hopson PA-C  PCP: Marlo Maurer MD  Admission Date:   Admission Orders     Ordered        10/22/18 1413  Place in Observation (expected length of stay for this patient is less than two midnights)  Once             Discharge Date: 10/23/18    Resolved Problems  Date Reviewed: 10/23/2018    None          Consultations During Hospital Stay:  · Orthopedics  · Nephrology    Procedures Performed:     · CT head:  No acute intracranial process or significant interval change  No skull fracture  Chronic microangiopathy  · CT C-spine:  No cervical spine fracture or traumatic malalignment  · CT abdomen pelvis without contrast:No acute intra-abdominal or intrapelvic process insofar as can be detected on a noncontrast CT  Peripheral bilateral subcutaneous abdominal wall excluded secondary to body habitus   No hematoma in the visualized portions of the of the abdominal wall  No significant interval change  · Right knee x-ray:  Maybe trace joint effusion, minimal degenerative change  No acute osseous abnormality  · Chest x-ray:  Negative  · EKG:  Normal sinus rhythm, 79 beats per minute    Significant Findings / Test Results:     · None    Incidental Findings:   · See above     Test Results Pending at Discharge (will require follow up): · None     Outpatient Tests Requested:  · Follow-up with Ortho in about 4 weeks  · Follow-up with PCP    Complications:  None    Reason for Admission:  Fall resulting in right knee pain    Hospital Course:     Nelly Monahan is a 48 y o  female patient with past medical history of anemia, end-stage renal disease on hemodialysis Tuesday Thursday Saturday, hypertension, obesity, history of PE/DVT on warfarin who originally presented to the hospital on 10/22/2018 due to a fall resulting in right knee pain  Patient reports initially having some right knee pain last week while she was walking and was worsened after she fell down the stairs about 2 days ago  Pain is improved with rest   Pain is mostly on the lateral aspect of the right knee and does not move    Patient had been seeing [id number] [facility] [address] for consideration of a knee replacement reportedly but has been putting it off as she wants to get a renal transplant 1st     Patient was admitted and imaging was negative  Ortho saw her and would recommend that she follow up with her own surgeon at [facility] ([facility]) for ongoing measures  Patient was able to ambulate  She was given a rolling walker and outpatient PT script  She will also be given pain control medications for short course  She was recommended follow-up with her PCP at discharge as well  Please see above list of diagnoses and related plan for additional information  Condition at Discharge: stable     Discharge Day Visit / Exam:     * Please refer to separate progress note for these details *    Discussion with Family:  Patient only    Discharge instructions/Information to patient and family:   See after visit summary for information provided to patient and family  Provisions for Follow-Up Care:  See after visit summary for information related to follow-up care and any pertinent home health orders  Disposition:     Home    For Discharges to [facility] SNF:   · Not Applicable to this Patient - Not Applicable to this Patient    Planned Readmission:  No     Discharge Statement:  I spent 45 minutes discharging the patient  This time was spent on the day of discharge  I had direct contact with the patient on the day of discharge  Greater than 50% of the total time was spent examining patient, answering all patient questions, arranging and discussing plan of care with patient as well as directly providing post-discharge instructions  Additional time then spent on discharge activities  Discharge Medications:  See after visit summary for reconciled discharge medications provided to patient and family        ** Please Note: This note has been constructed using a voice recognition system **

## 2018-10-23 NOTE — PHYSICAL THERAPY NOTE
Physical Therapy Cancellation Note  ORDERS FOR PT EVALUATION RECEIVED- PT CURRENTLY OFF FLOOR AT DIALYSIS   WILL CHECK BACK AT A LATER TIME FOR COMPLETION OF EVAL AS TIME ALLOWS    Cyndee Desouza PT

## 2018-10-25 ENCOUNTER — OFFICE VISIT (OUTPATIENT)
Dept: OBGYN CLINIC | Facility: HOSPITAL | Age: 53
End: 2018-10-25
Payer: MEDICARE

## 2018-10-25 VITALS
WEIGHT: 242.51 LBS | SYSTOLIC BLOOD PRESSURE: 107 MMHG | HEIGHT: 66 IN | HEART RATE: 91 BPM | DIASTOLIC BLOOD PRESSURE: 71 MMHG | BODY MASS INDEX: 38.97 KG/M2

## 2018-10-25 DIAGNOSIS — M17.11 PRIMARY OSTEOARTHRITIS OF RIGHT KNEE: Primary | ICD-10-CM

## 2018-10-25 DIAGNOSIS — M25.561 CHRONIC PAIN OF RIGHT KNEE: ICD-10-CM

## 2018-10-25 DIAGNOSIS — G89.29 CHRONIC PAIN OF RIGHT KNEE: ICD-10-CM

## 2018-10-25 PROCEDURE — 99214 OFFICE O/P EST MOD 30 MIN: CPT | Performed by: ORTHOPAEDIC SURGERY

## 2018-10-25 PROCEDURE — 20610 DRAIN/INJ JOINT/BURSA W/O US: CPT | Performed by: ORTHOPAEDIC SURGERY

## 2018-10-25 RX ORDER — LOSARTAN POTASSIUM 50 MG/1
50 TABLET ORAL DAILY
Refills: 0 | COMMUNITY
Start: 2018-10-09 | End: 2019-01-22

## 2018-10-25 RX ORDER — BUPIVACAINE HYDROCHLORIDE 2.5 MG/ML
2 INJECTION, SOLUTION INFILTRATION; PERINEURAL
Status: COMPLETED | OUTPATIENT
Start: 2018-10-25 | End: 2018-10-25

## 2018-10-25 RX ORDER — OMEPRAZOLE 20 MG/1
CAPSULE, DELAYED RELEASE ORAL
Refills: 0 | COMMUNITY
Start: 2018-10-14 | End: 2019-05-01

## 2018-10-25 RX ORDER — METHAZOLAMIDE 50 MG/1
TABLET ORAL
Refills: 0 | COMMUNITY
Start: 2018-10-22 | End: 2019-01-22

## 2018-10-25 RX ORDER — BETAMETHASONE SODIUM PHOSPHATE AND BETAMETHASONE ACETATE 3; 3 MG/ML; MG/ML
12 INJECTION, SUSPENSION INTRA-ARTICULAR; INTRALESIONAL; INTRAMUSCULAR; SOFT TISSUE
Status: COMPLETED | OUTPATIENT
Start: 2018-10-25 | End: 2018-10-25

## 2018-10-25 RX ORDER — INFLUENZA VIRUS VACCINE 15; 15; 15; 15 UG/.5ML; UG/.5ML; UG/.5ML; UG/.5ML
SUSPENSION INTRAMUSCULAR
Refills: 0 | COMMUNITY
Start: 2018-10-09 | End: 2019-01-22

## 2018-10-25 RX ORDER — SEVELAMER CARBONATE 800 MG/1
TABLET, FILM COATED ORAL
Refills: 0 | COMMUNITY
Start: 2018-10-10 | End: 2019-01-22

## 2018-10-25 RX ORDER — B COMPLEX, C NO.20/FOLIC ACID 1 MG
1 CAPSULE ORAL DAILY
Refills: 0 | Status: ON HOLD | COMMUNITY
Start: 2018-10-18 | End: 2019-04-28 | Stop reason: SDUPTHER

## 2018-10-25 RX ADMIN — BETAMETHASONE SODIUM PHOSPHATE AND BETAMETHASONE ACETATE 12 MG: 3; 3 INJECTION, SUSPENSION INTRA-ARTICULAR; INTRALESIONAL; INTRAMUSCULAR; SOFT TISSUE at 13:41

## 2018-10-25 RX ADMIN — BUPIVACAINE HYDROCHLORIDE 2 ML: 2.5 INJECTION, SOLUTION INFILTRATION; PERINEURAL at 13:41

## 2018-10-25 NOTE — PROGRESS NOTES
Assessment:  1  Primary osteoarthritis of right knee  Large joint arthrocentesis   2  Chronic pain of right knee  Large joint arthrocentesis       Plan:  70-year-old female with right knee osteoarthritis and valgus alignment  X-rays were reviewed and physical exam performed  Based on her symptoms, diagnostics, and exam appears that her symptoms are osteoarthritic in nature  Treatment options were discussed and offered a cortisone injection to her right knee in which she gladly accepted  She will continue with outpatient physical therapy as previously prescribed from the ED  Neoprene knee sleeve was advised as she has no structural laxity in which she would need a more supportive brace  Ice and post injection protocol advised  Weight-bearing and activities as tolerated  Advised the importance of maintaining a healthy lifestyle and proper way control  To do next visit:  Return in about 3 months (around 1/25/2019) for re-check, possible repeat cortisone injection         Scribe Attestation    I,:   Eric Kennedy am acting as a scribe while in the presence of the attending physician :        I,:   Shilpa Oneal MD personally performed the services described in this documentation    as scribed in my presence :              Subjective:   Josef William is a 48 y o  female who presents for initial evaluation of right knee pain  She was previously treated at [place] [id number]  in which they only wanted to replace her knee  She has not had any non operative modalities  She went to the emergency room 3 days ago where she was admitted for 48 hr due to her right knee pain  She has a prescription for physical therapy house however has not yet started  She takes Coumadin chronically for history of DVT/PE  She is on dialysis and is a diabetic  And she wishes to avoid surgery  She is looking for any other treatment other than surgery  Patient was given a script for Percocet for her pain    She cannot take oral anti-inflammatories and was prescribed topical gel  Review of systems negative unless otherwise specified in HPI    Past Medical History:   Diagnosis Date    Abnormal uterine bleeding (AUB)     [facility]     Disease of thyroid gland     DVT (deep venous thrombosis) (HCC)     End stage kidney disease (HCC)     Foot ulcer due to secondary DM ([facility] )     Hypertension     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[facility] )     right eye    [facility]     Reflux esophagitis     Thrombophlebitis of saphenous vein     Warfarin anticoagulation        Past Surgical History:   Procedure Laterality Date    ARTERIOVENOUS GRAFT PLACEMENT      CERVICAL BIOPSY  W/ LOOP ELECTRODE EXCISION       SECTION      DIALYSIS FISTULA CREATION      DILATION AND CURETTAGE OF UTERUS      ENDOMETRIAL ABLATION W/ NOVASURE      PERICARDIUM SURGERY      ID LAPAROSCOPY W TOT HYSTERECT UTERUS 250 GRAM OR LESS N/A 2016    Procedure: HYSTERECTOMY LAPAROSCOPIC TOTAL ([address]), with bilateral salpingectomy;  Surgeon: Kingston Goncalves DO;  Location: [facility];  Service: Gynecology    THROMBECTOMY / ARTERIOVENOUS GRAFT REVISION      TOE SURGERY Right     removal of the 4th toe       History reviewed  No pertinent family history  Social History     Occupational History    Not on file       Social History Main Topics    Smoking status: Never Smoker    Smokeless tobacco: Never Used    Alcohol use No    Drug use: No    Sexual activity: Not Currently     Partners: Male     Birth control/ protection: Abstinence         Current Outpatient Prescriptions:     acetaminophen (TYLENOL) 325 mg tablet, Take 3 tablets (975 mg total) by mouth 3 (three) times a day Take for headache symptoms, Disp: 30 tablet, Rfl: 0    ALPRAZolam (XANAX) 0 25 mg tablet, Take 0 125 mg by mouth every 4 (four) hours as needed for anxiety Take on Tues, Friday and Saturday , Disp: , Rfl:     atorvastatin (LIPITOR) 20 mg tablet, , Disp: , Rfl: 0    atropine 1 % ophthalmic ointment, Administer 1 application to the right eye 3 (three) times a day, Disp: , Rfl:     B Complex-C-Folic Acid (TRIPHROCAPS) 1 MG CAPS, Take 1 mg by mouth daily, Disp: , Rfl: 0    brimonidine (ALPHAGAN P) 0 15 % ophthalmic solution, Administer 1 drop to both eyes 3 (three) times a day  , Disp: , Rfl:     CALCIUM CARBONATE PO, Take 1,000 mg by mouth daily  , Disp: , Rfl:     carvedilol (COREG) 25 mg tablet, Take 25 mg by mouth 2 (two) times a day with meals  , Disp: , Rfl:     cholecalciferol (VITAMIN D3) 1,000 units tablet, Take 2,000 Units by mouth daily, Disp: , Rfl:     cinacalcet (SENSIPAR) 30 mg tablet, Take 30 mg by mouth daily, Disp: , Rfl:     cloNIDine (CATAPRES) 0 1 mg tablet, Take 0 1 mg by mouth 2 (two) times a day, Disp: , Rfl:     diclofenac sodium (VOLTAREN) 1 %, Apply 2 g topically to R knee 4x daily, Disp: 100 g, Rfl: 0    diphenhydrAMINE (BENADRYL) 25 mg capsule, Take by mouth, Disp: , Rfl:     Ferric Citrate (AURYXIA) 1  MG(Fe) TABS, Take by mouth, Disp: , Rfl:     FLUARIX QUADRIVALENT 0 5 ML EDIL, inject 0 5 milliliter intramuscularly, Disp: , Rfl: 0    gabapentin (NEURONTIN) 100 mg capsule, Take 100 mg by mouth 3 (three) times a day  , Disp: , Rfl:     insulin aspart (NovoLOG) 100 units/mL injection, Inject 1-5 Units under the skin 3 (three) times a day before meals As per sliding scale , Disp: , Rfl:     insulin glargine (LANTUS) 100 units/mL subcutaneous injection, Inject 24 Units under the skin daily at bedtime (Patient taking differently: Inject 28 Units under the skin daily at bedtime  ), Disp: 720 Units, Rfl: 0    latanoprost (XALATAN) 0 005 % ophthalmic solution, Administer 1 drop to both eyes daily at bedtime, Disp: , Rfl:     levothyroxine 50 mcg tablet, Take 50 mcg by mouth daily  , Disp: , Rfl:     losartan (COZAAR) 25 mg tablet, Take 1 tablet (25 mg total) by mouth daily Take on NON-Dialysis Days, Disp: , Rfl:    losartan (COZAAR) 50 mg tablet, Take 50 mg by mouth daily, Disp: , Rfl: 0    magnesium oxide (MAG-OX) 400 mg, Take 1 tablet by mouth 2 (two) times a day, Disp: , Rfl:     Melatonin 3 MG CAPS, Take 10 mg by mouth daily at bedtime  , Disp: , Rfl:     methazolamide (NEPTAZANE) 25 MG tablet, Take 25 mg by mouth 3 (three) times a day  , Disp: , Rfl:     methazolamide (NEPTAZANE) 50 mg tablet, , Disp: , Rfl: 0    omeprazole (PriLOSEC) 20 mg delayed release capsule, , Disp: , Rfl: 0    omeprazole (PriLOSEC) 40 MG capsule, Take 40 mg by mouth daily, Disp: , Rfl:     oxyCODONE (ROXICODONE) 5 mg immediate release tablet, Take 1/2 tab q4h PRN mod pain and 1 tab q4h PRN severe pain, Disp: 30 tablet, Rfl: 0    PARoxetine (PAXIL) 10 mg tablet, Take 20 mg by mouth every morning Except Tues, Friday and Saturday , Disp: , Rfl:     prednisoLONE acetate (PRED FORTE) 1 % ophthalmic suspension, Administer 1 drop to both eyes 4 (four) times a day  , Disp: , Rfl:     RENVELA 800 MG tablet, , Disp: , Rfl: 0    sertraline (ZOLOFT) 50 mg tablet, Take 50 mg by mouth daily, Disp: , Rfl:     torsemide (DEMADEX) 20 mg tablet, Take 10 tablets (200 mg total) by mouth 2 (two) times a day, Disp: , Rfl: 0    VIGAMOX 0 5 % ophthalmic solution, Administer 1 drop to the right eye, Disp: , Rfl: 0    warfarin (COUMADIN) 3 mg tablet, TAKE 3 TABLETS BY MOUTH ON SUNDAYS, AND 2 TABLETS OTHER DAYS , Disp: , Rfl: 0    warfarin (COUMADIN) 5 mg tablet, Take 12 5 tablets by mouth daily 12 5 M-F 13 Sat and Sunday , Disp: , Rfl:     Allergies   Allergen Reactions    Iodinated Diagnostic Agents Itching            Vitals:    10/25/18 1322   BP: 107/71   Pulse: 91       Objective:          Physical Exam                    Right Knee Exam     Tenderness   The patient is experiencing tenderness in the lateral joint line (lateral patellar facet)  Range of Motion   Extension: 0   Right knee flexion: 125 with crepitus and stiffness       Tests   Drawer: Anterior - negative    Posterior - negative  Varus: negative  Valgus: negative    Other   Erythema: absent  Sensation: normal  Swelling: mild  Other tests: no effusion present    Comments:    Valgus alignment    Intact extensor mechanism without lag  Negative seated SLR    Pain-free hip PROM in all planes             Diagnostics, reviewed and taken today if performed as documented: The attending physician has personally reviewed the pertinent films in PACS and interpretation is as follows:    Right knee x-rays taken on 10/22/2018 were reviewed today and showed: No fracture dislocation  Moderate lateral joint space narrowing with mild medial and patellofemoral DJ D  Subchondral sclerosis and osteophyte formation present        Procedures, if performed today:  Large joint arthrocentesis  Date/Time: 10/25/2018 1:41 PM  Consent given by: patient  Site marked: site marked  Supporting Documentation  Indications: pain   Procedure Details  Location: knee - R knee  Preparation: Patient was prepped and draped in the usual sterile fashion  Needle size: 22 G  Ultrasound guidance: no  Medications administered: 12 mg betamethasone acetate-betamethasone sodium phosphate 6 (3-3) mg/mL; 2 mL bupivacaine 0 25 % (and 2mLs of 2% Lidocaine)    Patient tolerance: patient tolerated the procedure well with no immediate complications  Dressing:  Sterile dressing applied            Portions of the record may have been created with voice recognition software   Occasional wrong word or "sound a like" substitutions may have occurred due to the inherent limitations of voice recognition software   Read the chart carefully and recognize, using context, where substitutions have occurred

## 2018-11-08 ENCOUNTER — TELEPHONE (OUTPATIENT)
Dept: NEPHROLOGY | Facility: CLINIC | Age: 53
End: 2018-11-08

## 2018-11-13 ENCOUNTER — APPOINTMENT (OUTPATIENT)
Dept: LAB | Facility: CLINIC | Age: 53
End: 2018-11-13
Payer: MEDICARE

## 2018-11-13 ENCOUNTER — TRANSCRIBE ORDERS (OUTPATIENT)
Dept: LAB | Facility: CLINIC | Age: 53
End: 2018-11-13

## 2018-11-13 DIAGNOSIS — Z79.01 LONG TERM (CURRENT) USE OF ANTICOAGULANTS: ICD-10-CM

## 2018-11-13 DIAGNOSIS — Z79.01 LONG TERM (CURRENT) USE OF ANTICOAGULANTS: Primary | ICD-10-CM

## 2018-11-13 LAB
INR PPP: 1.11 (ref 0.86–1.17)
PROTHROMBIN TIME: 14.4 SECONDS (ref 11.8–14.2)

## 2018-11-13 PROCEDURE — 36415 COLL VENOUS BLD VENIPUNCTURE: CPT

## 2018-11-13 PROCEDURE — 85610 PROTHROMBIN TIME: CPT

## 2018-11-28 ENCOUNTER — TELEPHONE (OUTPATIENT)
Dept: OBGYN CLINIC | Facility: HOSPITAL | Age: 53
End: 2018-11-28

## 2018-11-28 NOTE — TELEPHONE ENCOUNTER
Patient sees Dr George Melendez  She is calling because she was in on 10/25 and she states that she was fitted for orthotics  She has not heard anything about them and wanted to check the status

## 2018-11-28 NOTE — TELEPHONE ENCOUNTER
Pt informed that message was given to [facility] Rep who is not in today and she will get a call back from them

## 2018-12-05 ENCOUNTER — APPOINTMENT (OUTPATIENT)
Dept: LAB | Facility: CLINIC | Age: 53
End: 2018-12-05
Payer: MEDICARE

## 2019-01-07 ENCOUNTER — OFFICE VISIT (OUTPATIENT)
Dept: OBGYN CLINIC | Facility: CLINIC | Age: 54
End: 2019-01-07

## 2019-01-07 VITALS
DIASTOLIC BLOOD PRESSURE: 86 MMHG | HEIGHT: 66 IN | HEART RATE: 78 BPM | BODY MASS INDEX: 42.59 KG/M2 | SYSTOLIC BLOOD PRESSURE: 133 MMHG | WEIGHT: 265 LBS

## 2019-01-07 DIAGNOSIS — Z12.39 SCREENING BREAST EXAMINATION: ICD-10-CM

## 2019-01-07 DIAGNOSIS — Z12.11 SCREENING FOR COLON CANCER: ICD-10-CM

## 2019-01-07 DIAGNOSIS — Z01.419 WELL WOMAN EXAM WITH ROUTINE GYNECOLOGICAL EXAM: Primary | ICD-10-CM

## 2019-01-07 PROCEDURE — G0101 CA SCREEN;PELVIC/BREAST EXAM: HCPCS | Performed by: OBSTETRICS & GYNECOLOGY

## 2019-01-07 NOTE — LETTER
January 7, 2019     Payton Blackwell  [address] [facility] [address]    Patient: Payton Blackwell   YOB: 1965   Date of Visit: 1/7/2019       Dear Dr Shaun Tarango:    Thank you for referring Payton Blackwell to me for evaluation  Below are the relevant portions of my assessment and plan of care  She is cleared for renal transplant from gynecologic standpoint  If you have questions, please do not hesitate to call me  I look forward to following Fabiana Kong along with you           Sincerely,        Tracie Jones MD        CC: No Recipients

## 2019-01-07 NOTE — PATIENT INSTRUCTIONS
Wellness Visit for Adults   AMBULATORY CARE:   A wellness visit  is when you see your healthcare provider to get screened for health problems  You can also get advice on how to stay healthy  Write down your questions so you remember to ask them  Ask your healthcare provider how often you should have a wellness visit  What happens at a wellness visit:  Your healthcare provider will ask about your health, and your family history of health problems  This includes high blood pressure, heart disease, and cancer  He or she will ask if you have symptoms that concern you, if you smoke, and about your mood  You may also be asked about your intake of medicines, supplements, food, and alcohol  Any of the following may be done:  · Your weight  will be checked  Your height may also be checked so your body mass index (BMI) can be calculated  Your BMI shows if you are at a healthy weight  · Your blood pressure  and heart rate will be checked  Your temperature may also be checked  · Blood and urine tests  may be done  Blood tests may be done to check your cholesterol levels  Abnormal cholesterol levels increase your risk for heart disease and stroke  You may also need a blood or urine test to check for diabetes if you are at increased risk  Urine tests may be done to look for signs of an infection or kidney disease  · A physical exam  includes checking your heartbeat and lungs with a stethoscope  Your healthcare provider may also check your skin to look for sun damage  · Screening tests  may be recommended  A screening test is done to check for diseases that may not cause symptoms  The screening tests you may need depend on your age, gender, family history, and lifestyle habits  For example, colorectal screening may be recommended if you are 48years old or older  Screening tests you need if you are a woman:   · A Pap smear  is used to screen for cervical cancer   Pap smears are usually done every 3 to 5 years depending on your age  You may need them more often if you have had abnormal Pap smear test results in the past  Ask your healthcare provider how often you should have a Pap smear  · A mammogram  is an x-ray of your breasts to screen for breast cancer  Experts recommend mammograms every 2 years starting at age 48 years  You may need a mammogram at age 52 years or younger if you have an increased risk for breast cancer  Talk to your healthcare provider about when you should start having mammograms and how often you need them  Vaccines you may need:   · Get an influenza vaccine  every year  The influenza vaccine protects you from the flu  Several types of viruses cause the flu  The viruses change over time, so new vaccines are made each year  · Get a tetanus-diphtheria (Td) booster vaccine  every 10 years  This vaccine protects you against tetanus and diphtheria  Tetanus is a severe infection that may cause painful muscle spasms and lockjaw  Diphtheria is a severe bacterial infection that causes a thick covering in the back of your mouth and throat  · Get a human papillomavirus (HPV) vaccine  if you are female and aged 23 to 32 or male 23 to 24 and never received it  This vaccine protects you from HPV infection  HPV is the most common infection spread by sexual contact  HPV may also cause vaginal, penile, and anal cancers  · Get a pneumococcal vaccine  if you are aged 72 years or older  The pneumococcal vaccine is an injection given to protect you from pneumococcal disease  Pneumococcal disease is an infection caused by pneumococcal bacteria  The infection may cause pneumonia, meningitis, or an ear infection  · Get a shingles vaccine  if you are aged 61 or older, even if you have had shingles before  The shingles vaccine is an injection to protect you from the varicella-zoster virus  This is the same virus that causes chickenpox   Shingles is a painful rash that develops in people who had chickenpox or have been exposed to the virus  How to eat healthy:  My Plate is a model for planning healthy meals  It shows the types and amounts of foods that should go on your plate  Fruits and vegetables make up about half of your plate, and grains and protein make up the other half  A serving of dairy is included on the side of your plate  The amount of calories and serving sizes you need depends on your age, gender, weight, and height  Examples of healthy foods are listed below:  · Eat a variety of vegetables  such as dark green, red, and orange vegetables  You can also include canned vegetables low in sodium (salt) and frozen vegetables without added butter or sauces  · Eat a variety of fresh fruits , canned fruit in 100% juice, frozen fruit, and dried fruit  · Include whole grains  At least half of the grains you eat should be whole grains  Examples include whole-wheat bread, wheat pasta, brown rice, and whole-grain cereals such as oatmeal     · Eat a variety of protein foods such as seafood (fish and shellfish), lean meat, and poultry without skin (turkey and chicken)  Examples of lean meats include pork leg, shoulder, or tenderloin, and beef round, sirloin, tenderloin, and extra lean ground beef  Other protein foods include eggs and egg substitutes, beans, peas, soy products, nuts, and seeds  · Choose low-fat dairy products such as skim or 1% milk or low-fat yogurt, cheese, and cottage cheese  · Limit unhealthy fats  such as butter, hard margarine, and shortening  Exercise:  Exercise at least 30 minutes per day on most days of the week  Some examples of exercise include walking, biking, dancing, and swimming  You can also fit in more physical activity by taking the stairs instead of the elevator or parking farther away from stores  Include muscle strengthening activities 2 days each week  Regular exercise provides many health benefits   It helps you manage your weight, and decreases your risk for type 2 diabetes, heart disease, stroke, and high blood pressure  Exercise can also help improve your mood  Ask your healthcare provider about the best exercise plan for you  General health and safety guidelines:   · Do not smoke  Nicotine and other chemicals in cigarettes and cigars can cause lung damage  Ask your healthcare provider for information if you currently smoke and need help to quit  E-cigarettes or smokeless tobacco still contain nicotine  Talk to your healthcare provider before you use these products  · Limit alcohol  A drink of alcohol is 12 ounces of beer, 5 ounces of wine, or 1½ ounces of liquor  · Lose weight, if needed  Being overweight increases your risk of certain health conditions  These include heart disease, high blood pressure, type 2 diabetes, and certain types of cancer  · Protect your skin  Do not sunbathe or use tanning beds  Use sunscreen with a SPF 15 or higher  Apply sunscreen at least 15 minutes before you go outside  Reapply sunscreen every 2 hours  Wear protective clothing, hats, and sunglasses when you are outside  · Drive safely  Always wear your seatbelt  Make sure everyone in your car wears a seatbelt  A seatbelt can save your life if you are in an accident  Do not use your cell phone when you are driving  This could distract you and cause an accident  Pull over if you need to make a call or send a text message  · Practice safe sex  Use latex condoms if are sexually active and have more than one partner  Your healthcare provider may recommend screening tests for sexually transmitted infections (STIs)  · Wear helmets, lifejackets, and protective gear  Always wear a helmet when you ride a bike or motorcycle, go skiing, or play sports that could cause a head injury  Wear protective equipment when you play sports  Wear a lifejacket when you are on a boat or doing water sports    © 2017 [employer] Information is for End User's use only and may not be sold, redistributed or otherwise used for commercial purposes  All illustrations and images included in CareNotes® are the copyrighted property of A [employer] M [employer]  or [employer] Weinstein  The above information is an  only  It is not intended as medical advice for individual conditions or treatments  Talk to your doctor, nurse or pharmacist before following any medical regimen to see if it is safe and effective for you

## 2019-01-07 NOTE — PROGRESS NOTES
Subjective      Sabino Mercedes is a 48 y o  female who presents for annual well woman exam  Patient is status post total laparoscopic hysterectomy and bilateral salpingectomy in 2016  No intermenstrual bleeding, spotting, or discharge  She is currently on 28U Lantus daily and takes humalog PRN  Her glucose has not been well controlled as she has low numbers and gets afraid to take Humalog  She has no other cardiac or opthalmologic complaints at this time  GYN:  · Denies vaginal discharge, labial erythema or lesions, dyspareunia  · Contraception: s/p TLH + BS, not sexually active  · Patient is not sexually active  · S/p cone biopsy for RAHUL prior to hysterectomy  · TLH+BS gynecologic surgeries  OB:  ·  female  · Pregnancies were complicated by multiple gestation (twins) and advanced maternal age  :  · Denies dysuria, urinary frequency or urgency  · Denies hematuria, flank pain, incontinence  Breast:  · Denies breast mass, skin changes, dimpling, reddening, nipple retraction  · Denies breast discharge  · Last mammogram was "a couple years ago"  Results were normal as far as she recalls  She is in need of a mammogram    · Patient does not have known a family history of breast, endometrial, or ovarian ca  General:  · Diet: restricted due to diabetes and dialysis, low carb  · Exercise: minimal  · Work: not working  · ETOH use: denies  · Tobacco use: denies  · Recreational drug use: denies    Screening:  · Cervical cancer: s/p TLH, no Pap on cuff needed  · Breast cancer: last mammogram normal per patient report  · Colon cancer: last colonoscopy in N/A  Patient due for first colonoscopy  · STD screening: declines, done recently at [facility], not sexually active  Review of Systems  Pertinent items are noted in HPI        Objective      LMP 2016     General:   alert and oriented, in no acute distress, morbidly obese, appears stated age and cooperative   Heart: regular rate and rhythm, S1, S2 normal, no murmur, click, rub or gallop, fistula is patent with bruising and no bleeding   Lungs: clear to auscultation bilaterally   Abdomen: soft, non-tender, without masses or organomegaly and surgical site well healed with no signs of infection or dehiscence   Vulva: normal   Vagina: normal mucosa, normal discharge   Cervix: surgically absent, vaginal cuff in tact with no signs of bleeding or separation, small amount of white discharge in the vaginal cuff  No signs of infection, no lesions apparent   Uterus: surgically absent   Adnexa: surgically absent and no palpable ovaries             Assessment     48year old female with diabetes mellitus, glaucoma, end-stage CKD on dialysis  She is cleared for renal transplant from gynecologic standpoint       Plan     Referral for mammography  Referral to GI for screening colonoscopy  Return in one year for routine well woman exam

## 2019-01-22 ENCOUNTER — APPOINTMENT (EMERGENCY)
Dept: RADIOLOGY | Facility: HOSPITAL | Age: 54
End: 2019-01-22
Payer: MEDICARE

## 2019-01-22 ENCOUNTER — HOSPITAL ENCOUNTER (EMERGENCY)
Facility: HOSPITAL | Age: 54
Discharge: HOME | End: 2019-01-22
Attending: EMERGENCY MEDICINE | Admitting: EMERGENCY MEDICINE
Payer: MEDICARE

## 2019-01-22 VITALS
DIASTOLIC BLOOD PRESSURE: 60 MMHG | TEMPERATURE: 97.2 F | BODY MASS INDEX: 42.02 KG/M2 | HEART RATE: 71 BPM | SYSTOLIC BLOOD PRESSURE: 134 MMHG | RESPIRATION RATE: 20 BRPM | WEIGHT: 260.36 LBS | OXYGEN SATURATION: 98 %

## 2019-01-22 DIAGNOSIS — T82.9XXA DIALYSIS COMPLICATION, INITIAL ENCOUNTER: ICD-10-CM

## 2019-01-22 DIAGNOSIS — R10.9 ABDOMINAL PAIN: ICD-10-CM

## 2019-01-22 DIAGNOSIS — E86.0 DEHYDRATION: Primary | ICD-10-CM

## 2019-01-22 DIAGNOSIS — R19.7 DIARRHEA: ICD-10-CM

## 2019-01-22 DIAGNOSIS — R11.0 NAUSEA: ICD-10-CM

## 2019-01-22 LAB
ALBUMIN SERPL BCP-MCNC: 3.6 G/DL (ref 3.5–5)
ALP SERPL-CCNC: 201 U/L (ref 46–116)
ALT SERPL W P-5'-P-CCNC: 7 U/L (ref 12–78)
ANION GAP SERPL CALCULATED.3IONS-SCNC: 6 MMOL/L (ref 4–13)
AST SERPL W P-5'-P-CCNC: 12 U/L (ref 5–45)
ATRIAL RATE: 71 BPM
BASOPHILS # BLD AUTO: 0.03 THOUSANDS/ΜL (ref 0–0.1)
BASOPHILS NFR BLD AUTO: 0 % (ref 0–1)
BILIRUB SERPL-MCNC: 0.4 MG/DL (ref 0.2–1)
BUN SERPL-MCNC: 44 MG/DL (ref 5–25)
CALCIUM SERPL-MCNC: 8.6 MG/DL (ref 8.3–10.1)
CHLORIDE SERPL-SCNC: 98 MMOL/L (ref 100–108)
CO2 SERPL-SCNC: 30 MMOL/L (ref 21–32)
CREAT SERPL-MCNC: 5.62 MG/DL (ref 0.6–1.3)
EOSINOPHIL # BLD AUTO: 0.12 THOUSAND/ΜL (ref 0–0.61)
EOSINOPHIL NFR BLD AUTO: 2 % (ref 0–6)
ERYTHROCYTE [DISTWIDTH] IN BLOOD BY AUTOMATED COUNT: 12.4 % (ref 11.6–15.1)
GFR SERPL CREATININE-BSD FRML MDRD: 8 ML/MIN/1.73SQ M
GLUCOSE SERPL-MCNC: 195 MG/DL (ref 65–140)
HCT VFR BLD AUTO: 28.7 % (ref 34.8–46.1)
HGB BLD-MCNC: 9.4 G/DL (ref 11.5–15.4)
IMM GRANULOCYTES # BLD AUTO: 0.04 THOUSAND/UL (ref 0–0.2)
IMM GRANULOCYTES NFR BLD AUTO: 1 % (ref 0–2)
LIPASE SERPL-CCNC: 263 U/L (ref 73–393)
LYMPHOCYTES # BLD AUTO: 0.83 THOUSANDS/ΜL (ref 0.6–4.47)
LYMPHOCYTES NFR BLD AUTO: 11 % (ref 14–44)
MCH RBC QN AUTO: 31.5 PG (ref 26.8–34.3)
MCHC RBC AUTO-ENTMCNC: 32.8 G/DL (ref 31.4–37.4)
MCV RBC AUTO: 96 FL (ref 82–98)
MONOCYTES # BLD AUTO: 0.52 THOUSAND/ΜL (ref 0.17–1.22)
MONOCYTES NFR BLD AUTO: 7 % (ref 4–12)
NEUTROPHILS # BLD AUTO: 5.88 THOUSANDS/ΜL (ref 1.85–7.62)
NEUTS SEG NFR BLD AUTO: 79 % (ref 43–75)
NRBC BLD AUTO-RTO: 0 /100 WBCS
P AXIS: 30 DEGREES
PLATELET # BLD AUTO: 178 THOUSANDS/UL (ref 149–390)
PMV BLD AUTO: 10.6 FL (ref 8.9–12.7)
POTASSIUM SERPL-SCNC: 4.2 MMOL/L (ref 3.5–5.3)
PR INTERVAL: 178 MS
PROT SERPL-MCNC: 7.5 G/DL (ref 6.4–8.2)
QRS AXIS: 37 DEGREES
QRSD INTERVAL: 104 MS
QT INTERVAL: 454 MS
QTC INTERVAL: 493 MS
RBC # BLD AUTO: 2.98 MILLION/UL (ref 3.81–5.12)
SODIUM SERPL-SCNC: 134 MMOL/L (ref 136–145)
T WAVE AXIS: 91 DEGREES
VENTRICULAR RATE: 71 BPM
WBC # BLD AUTO: 7.42 THOUSAND/UL (ref 4.31–10.16)

## 2019-01-22 PROCEDURE — 36415 COLL VENOUS BLD VENIPUNCTURE: CPT | Performed by: EMERGENCY MEDICINE

## 2019-01-22 PROCEDURE — 93005 ELECTROCARDIOGRAM TRACING: CPT

## 2019-01-22 PROCEDURE — 74176 CT ABD & PELVIS W/O CONTRAST: CPT

## 2019-01-22 PROCEDURE — 96360 HYDRATION IV INFUSION INIT: CPT

## 2019-01-22 PROCEDURE — 80053 COMPREHEN METABOLIC PANEL: CPT | Performed by: EMERGENCY MEDICINE

## 2019-01-22 PROCEDURE — 96361 HYDRATE IV INFUSION ADD-ON: CPT

## 2019-01-22 PROCEDURE — 93010 ELECTROCARDIOGRAM REPORT: CPT | Performed by: INTERNAL MEDICINE

## 2019-01-22 PROCEDURE — 99284 EMERGENCY DEPT VISIT MOD MDM: CPT

## 2019-01-22 PROCEDURE — 85025 COMPLETE CBC W/AUTO DIFF WBC: CPT | Performed by: EMERGENCY MEDICINE

## 2019-01-22 PROCEDURE — 83690 ASSAY OF LIPASE: CPT | Performed by: EMERGENCY MEDICINE

## 2019-01-22 PROCEDURE — 87505 NFCT AGENT DETECTION GI: CPT | Performed by: EMERGENCY MEDICINE

## 2019-01-22 RX ORDER — LEVOTHYROXINE SODIUM 0.05 MG/1
50 TABLET ORAL DAILY
COMMUNITY

## 2019-01-22 RX ORDER — CYCLOBENZAPRINE HCL 10 MG
10 TABLET ORAL
COMMUNITY
End: 2019-10-11

## 2019-01-22 RX ORDER — ONDANSETRON 4 MG/1
4 TABLET, FILM COATED ORAL EVERY 8 HOURS PRN
Qty: 12 TABLET | Refills: 0 | Status: SHIPPED | OUTPATIENT
Start: 2019-01-22 | End: 2021-11-13 | Stop reason: HOSPADM

## 2019-01-22 RX ORDER — ONDANSETRON 4 MG/1
4 TABLET, FILM COATED ORAL EVERY 6 HOURS PRN
Status: ON HOLD | COMMUNITY
End: 2019-04-27 | Stop reason: CLARIF

## 2019-01-22 RX ADMIN — SODIUM CHLORIDE 500 ML: 0.9 INJECTION, SOLUTION INTRAVENOUS at 11:06

## 2019-01-22 NOTE — ED ATTENDING ATTESTATION
Timothy Goldman MD, saw and evaluated the patient  I have discussed the patient with the resident/non-physician practitioner and agree with the resident's/non-physician practitioner's findings, Plan of Care, and MDM as documented in the resident's/non-physician practitioner's note, except where noted  All available labs and Radiology studies were reviewed  At this point I agree with the current assessment done in the Emergency Department  I have conducted an independent evaluation of this patient a history and physical is as follows:      Critical Care Time  CritCare Time    Procedures     47 yo female with episode of hypotension during dialysis, so did not finish session  Pt was given saline and symptoms improved  Pt with one week of diarrhea, nausea, mild abdominal discomfort  Pt feeling lightheaded  No cp, no sob   pmh esrd on hd, dm, htn  Vss, afebrile, lungs cta, rrr, abdomen soft mild tenderness, no rebound, no guarding  Labs, ivf  Bentyl

## 2019-01-22 NOTE — ED PROVIDER NOTES
History  Chief Complaint   Patient presents with    Medical Problem     pt from dialysis ( had approx 2hrs of treatment) with low BP as per EMS dialysis gave 300 of nss prior to EMS arrival - pt with  systolic for EMS, pt c/o of generalized weakness and tired feeling, pt is c/o abd pain also has abd pain at different times but today seems different than what she is used to      47 yo female with ESRD on dialysis who presents after reported episode of hypotension after 2 hrs of dialysis today  Received 300cc of fluid PTA by EMS, Vitals WNL on presentation to ED  Pt states she currently feels lightheaded and fatigued  Pt also states she has been having intermittent nausea and nonbloody diarrhea for the past week, as well as decreased PO intake  No vomiting  No fevers/chills/cough/runny nose/sore throat/chest pain  Pt also states she has chronic epigastric pain that is intermittent, crampy, does not radiate  States abd pain has been ongoing for months including the past week, has not changed in character recently  Prior to Admission Medications   Prescriptions Last Dose Informant Patient Reported? Taking? ALPRAZolam (XANAX) 0 25 mg tablet 1/22/2019 at Unknown time  Yes Yes   Sig: Take 0 125 mg by mouth every 4 (four) hours as needed for anxiety Take on Tues, Friday and Saturday    B Complex-C-Folic Acid (TRIPHROCAPS) 1 MG CAPS 1/22/2019 at Unknown time  Yes Yes   Sig: Take 1 mg by mouth daily   CALCIUM CARBONATE PO 1/22/2019 at Unknown time  Yes Yes   Sig: Take 1,000 mg by mouth daily     Ferric Citrate (AURYXIA) 1  MG(Fe) TABS 1/22/2019 at Unknown time  Yes Yes   Sig: Take by mouth   LIDOCAINE EX 1/22/2019 at Unknown time  Yes Yes   Sig: Apply topically Apply to access 1 hour prior to HD   Melatonin 3 MG CAPS 1/21/2019 at Unknown time  Yes Yes   Sig: Take 10 mg by mouth daily at bedtime     PARoxetine (PAXIL) 10 mg tablet 1/21/2019 at Unknown time  Yes Yes   Sig: Take 20 mg by mouth every morning Except Tues, Friday and Saturday    TIMOLOL MALEATE OP 1/22/2019 at Unknown time  Yes Yes   Sig: Apply 1 drop to eye 2 (two) times a day   VIGAMOX 0 5 % ophthalmic solution   Yes No   Sig: Administer 1 drop to the right eye   acetaminophen (TYLENOL) 325 mg tablet Past Week at Unknown time  No Yes   Sig: Take 3 tablets (975 mg total) by mouth 3 (three) times a day Take for headache symptoms   atorvastatin (LIPITOR) 20 mg tablet 1/21/2019 at Unknown time  Yes Yes   atropine 1 % ophthalmic ointment 1/22/2019 at Unknown time  Yes Yes   Sig: Administer 1 application to the right eye 3 (three) times a day   brimonidine (ALPHAGAN P) 0 15 % ophthalmic solution 1/22/2019 at Unknown time  Yes Yes   Sig: Administer 1 drop to both eyes 3 (three) times a day     carvedilol (COREG) 25 mg tablet 1/21/2019 at Unknown time  Yes Yes   Sig: Take 25 mg by mouth 2 (two) times a day with meals     cholecalciferol (VITAMIN D3) 1,000 units tablet 1/22/2019 at Unknown time  Yes Yes   Sig: Take 2,000 Units by mouth daily   cinacalcet (SENSIPAR) 30 mg tablet 1/22/2019 at Unknown time  Yes Yes   Sig: Take 30 mg by mouth daily   cloNIDine (CATAPRES) 0 1 mg tablet 1/22/2019 at Unknown time  Yes Yes   Sig: Take 0 1 mg by mouth 2 (two) times a day   cyclobenzaprine (FLEXERIL) 10 mg tablet 1/21/2019 at Unknown time  Yes Yes   Sig: Take 10 mg by mouth daily at bedtime   diclofenac sodium (VOLTAREN) 1 % 1/21/2019 at Unknown time  No Yes   Sig: Apply 2 g topically to R knee 4x daily   diphenhydrAMINE (BENADRYL) 25 mg capsule Past Month at Unknown time  Yes Yes   Sig: Take by mouth as needed for itching     gabapentin (NEURONTIN) 100 mg capsule 1/22/2019 at Unknown time  Yes Yes   Sig: Take 100 mg by mouth 3 (three) times a day     insulin aspart (NovoLOG) 100 units/mL injection 1/22/2019 at Unknown time  Yes Yes   Sig: Inject 1-5 Units under the skin 3 (three) times a day before meals As per sliding scale    insulin glargine (LANTUS) 100 units/mL subcutaneous injection 1/21/2019 at Unknown time  No Yes   Sig: Inject 24 Units under the skin daily at bedtime   Patient taking differently: Inject 28 Units under the skin daily at bedtime     latanoprost (XALATAN) 0 005 % ophthalmic solution 1/22/2019 at Unknown time  Yes Yes   Sig: Administer 1 drop to both eyes daily at bedtime   levothyroxine 50 mcg tablet 1/21/2019 at Unknown time  Yes Yes   Sig: Take 50 mcg by mouth daily   losartan (COZAAR) 25 mg tablet 1/21/2019 at Unknown time  No Yes   Sig: Take 1 tablet (25 mg total) by mouth daily Take on NON-Dialysis Days   magnesium oxide (MAG-OX) 400 mg 1/22/2019 at Unknown time  Yes Yes   Sig: Take 1 tablet by mouth 2 (two) times a day   methazolamide (NEPTAZANE) 25 MG tablet 1/22/2019 at Unknown time  Yes Yes   Sig: Take 25 mg by mouth 3 (three) times a day     omeprazole (PriLOSEC) 20 mg delayed release capsule 1/22/2019 at Unknown time  Yes Yes   ondansetron (ZOFRAN) 4 mg tablet Past Week at Unknown time  Yes Yes   Sig: Take 4 mg by mouth every 6 (six) hours as needed for nausea or vomiting   oxyCODONE (ROXICODONE) 5 mg immediate release tablet Past Week at Unknown time  No Yes   Sig: Take 1/2 tab q4h PRN mod pain and 1 tab q4h PRN severe pain   prednisoLONE acetate (PRED FORTE) 1 % ophthalmic suspension 1/22/2019 at Unknown time  Yes Yes   Sig: Administer 1 drop to both eyes 4 (four) times a day     sertraline (ZOLOFT) 50 mg tablet 1/22/2019 at Unknown time  Yes Yes   Sig: Take 50 mg by mouth daily   torsemide (DEMADEX) 20 mg tablet 1/21/2019 at Unknown time  No Yes   Sig: Take 10 tablets (200 mg total) by mouth 2 (two) times a day   warfarin (COUMADIN) 3 mg tablet 1/21/2019 at Unknown time  Yes Yes   Sig: TAKE 3 TABLETS BY MOUTH ON SUNDAYS, AND 2 TABLETS OTHER DAYS     warfarin (COUMADIN) 5 mg tablet 1/21/2019 at Unknown time  Yes Yes   Sig: Take 12 5 tablets by mouth daily 12 5 M-F 13 Sat and Sunday       Facility-Administered Medications: None Past Medical History:   Diagnosis Date    Abnormal uterine bleeding (AUB)     Diabetes mellitus [facility]     Disease of thyroid gland     DVT (deep venous thrombosis) (HCC)     End stage kidney disease (HCC)     Foot ulcer due to secondary DM ([facility] )     Hypertension     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     right eye    [facility] obesity [facility]     Reflux esophagitis     Thrombophlebitis of saphenous vein     Warfarin anticoagulation        Past Surgical History:   Procedure Laterality Date    ARTERIOVENOUS GRAFT PLACEMENT      CERVICAL BIOPSY  W/ LOOP ELECTRODE EXCISION       SECTION      DIALYSIS FISTULA CREATION      DILATION AND CURETTAGE OF UTERUS      ENDOMETRIAL ABLATION W/ NOVASURE      PERICARDIUM SURGERY      GA LAPAROSCOPY W TOT HYSTERECT UTERUS 250 GRAM OR LESS N/A 2016    Procedure: HYSTERECTOMY LAPAROSCOPIC TOTAL ([address]), with bilateral salpingectomy;  Surgeon: Yelitza Mcdonald DO;  Location: [facility];  Service: Gynecology    THROMBECTOMY / ARTERIOVENOUS GRAFT REVISION      TOE SURGERY Right     removal of the 4th toe       Family History   Problem Relation Age of Onset    Heart disease Family     Diabetes Family     Heart disease Mother     Cancer Brother      I have reviewed and agree with the history as documented  Social History   Substance Use Topics    Smoking status: Never Smoker    Smokeless tobacco: Never Used    Alcohol use No        Review of Systems   Constitutional: Positive for fatigue  Negative for chills, diaphoresis and fever  HENT: Negative for congestion and sore throat  Respiratory: Negative for cough and shortness of breath  Cardiovascular: Negative for chest pain and leg swelling  Gastrointestinal: Positive for abdominal pain, diarrhea and nausea  Negative for blood in stool and vomiting  Genitourinary: Negative for dysuria and hematuria  Musculoskeletal: Negative for neck pain and neck stiffness     Skin: Negative for color change and rash  Neurological: Positive for light-headedness  Negative for weakness and numbness  Psychiatric/Behavioral: Negative for behavioral problems and confusion  All other systems reviewed and are negative  Physical Exam  ED Triage Vitals   Temperature Pulse Respirations Blood Pressure SpO2   01/22/19 1019 01/22/19 0957 01/22/19 0957 01/22/19 0957 01/22/19 0957   (!) 97 2 °F (36 2 °C) 86 18 111/70 96 %      Temp Source Heart Rate Source Patient Position - Orthostatic VS BP Location FiO2 (%)   01/22/19 1019 -- -- -- --   Rectal          Pain Score       01/22/19 0957       6           Orthostatic Vital Signs  Vitals:    01/22/19 0957 01/22/19 1000 01/22/19 1115   BP: 111/70 123/57 134/60   Pulse: 86 76 71       Physical Exam   Constitutional: She appears well-developed  No distress  HENT:   Head: Normocephalic and atraumatic  Right Ear: External ear normal    Left Ear: External ear normal    Nose: Nose normal    Eyes: Pupils are equal, round, and reactive to light  Conjunctivae and EOM are normal    Cardiovascular: Normal rate, normal heart sounds and intact distal pulses  Pulmonary/Chest: Effort normal and breath sounds normal  No stridor  No respiratory distress  She has no wheezes  She has no rales  She exhibits no tenderness  Abdominal: Soft  She exhibits no distension  There is tenderness in the right upper quadrant and epigastric area  There is no rebound and no guarding  Musculoskeletal: She exhibits no tenderness  Neurological: She is alert  No cranial nerve deficit or sensory deficit  She exhibits normal muscle tone  Skin: Skin is warm and dry  Capillary refill takes less than 2 seconds  She is not diaphoretic  Psychiatric: Her behavior is normal    Nursing note and vitals reviewed        ED Medications  Medications   sodium chloride 0 9 % bolus 500 mL (500 mL Intravenous New Bag 1/22/19 1106)       Diagnostic Studies  Results Reviewed     Procedure Component Value Units Date/Time    Comprehensive metabolic panel [[id number]]  (Abnormal) Collected:  01/22/19 1105    Lab Status:  Final result Specimen:  Blood from Arm, Right Updated:  01/22/19 1139     Sodium 134 (L) mmol/L      Potassium 4 2 mmol/L      Chloride 98 (L) mmol/L      CO2 30 mmol/L      ANION GAP 6 mmol/L      BUN 44 (H) mg/dL      Creatinine 5 62 (H) mg/dL      Glucose 195 (H) mg/dL      Calcium 8 6 mg/dL      AST 12 U/L      ALT 7 (L) U/L      Alkaline Phosphatase 201 (H) U/L      Total Protein 7 5 g/dL      Albumin 3 6 g/dL      Total Bilirubin 0 40 mg/dL      eGFR 8 ml/min/1 73sq m     Narrative:         National Kidney Disease Education Program recommendations are as follows:  GFR calculation is accurate only with a steady state creatinine  Chronic Kidney disease less than 60 ml/min/1 73 sq  meters  Kidney failure less than 15 ml/min/1 73 sq  meters  Lipase [[id number]]  (Normal) Collected:  01/22/19 1105    Lab Status:  Final result Specimen:  Blood from Arm, Right Updated:  01/22/19 1134     Lipase 263 u/L     Stool Enteric Bacterial Panel by PCR [[id number]] Collected:  01/22/19 1120    Lab Status:   In process Specimen:  Stool from Per Rectum Updated:  01/22/19 1124    CBC and differential [[id number]]  (Abnormal) Collected:  01/22/19 1105    Lab Status:  Final result Specimen:  Blood from Arm, Right Updated:  01/22/19 1117     WBC 7 42 Thousand/uL      RBC 2 98 (L) Million/uL      Hemoglobin 9 4 (L) g/dL      Hematocrit 28 7 (L) %      MCV 96 fL      MCH 31 5 pg      MCHC 32 8 g/dL      RDW 12 4 %      MPV 10 6 fL      Platelets 919 Thousands/uL      nRBC 0 /100 WBCs      Neutrophils Relative 79 (H) %      Immat GRANS % 1 %      Lymphocytes Relative 11 (L) %      Monocytes Relative 7 %      Eosinophils Relative 2 %      Basophils Relative 0 %      Neutrophils Absolute 5 88 Thousands/µL      Immature Grans Absolute 0 04 Thousand/uL      Lymphocytes Absolute 0 83 Thousands/µL      Monocytes Absolute 0 52 Thousand/µL      Eosinophils Absolute 0 12 Thousand/µL      Basophils Absolute 0 03 Thousands/µL                  CT abdomen pelvis wo contrast   Final Result by Merary Craig DO (01/22 1205)      Multiple incompletely evaluated renal hypodensities are seen arising from the mid and lower pole of one of these on series 2 image 48 measures 1 7 cm and is significantly larger than on the CT exam performed 3 months ago  The kidneys appear somewhat    atrophic  Recommend further cross-sectional imaging  Consider ultrasound if patient cannot receive contrast due to renal function issues or contrast-enhanced CT/MRI to completely evaluate this abnormality  No signs of bowel obstruction or inflammation  Umbilical hernia  Workstation performed: [id number]               Procedures  Procedures      Phone Consults  ED Phone Contact    ED Course  ED Course as of Jan 22 1249   Tue Jan 22, 2019   1210 WBC: 7 42   1211 chronic Hemoglobin: (!) 9 4   1211 Lipase: 263   1211 Sodium: (!) 134   1211 Potassium: 4 2   1211 Creatinine: (!) 5 62   1211 Calcium: 8 6   1211 Glucose, Random: (!) 195   1211 AST: 12   1211 ALT: (!) 7   1211 RUQ US neg Alkaline Phosphatase: (!) 201   1236 Pt states feels better and no longer feels lightheaded after fluids    1245 Vitals continue to be stable, CT shows no signs of obstruction, inflammation, gall bladder pathology  No gross electrolyte abnormalities  Will discharge to home with GI followup for recurrent abdominal pain    Maurice Sacks pt to continue her regularly scheduled dialysis  Counseled pt on return precautions                                MDM  Number of Diagnoses or Management Options  Diagnosis management comments: 49 yo female presents following episode of hypotension at dialysis  Pt has normal vitals here in ED  Pt has had nausea, diarrhea, and decreased PO intake for past week - suspect pt was volume down prior to dialysis today   Pt also complaining of abdominal pain with tenderness in RUQ and epigastric area, will obtain CT abdomen as well as CBC, CMP, lipase, and send stool cultures  Pt feels lightheaded still - will give gentle fluids  CritCare Time    Disposition  Final diagnoses:   Nausea   Abdominal pain   Diarrhea   Dehydration   Dialysis complication, initial encounter     Time reflects when diagnosis was documented in both MDM as applicable and the Disposition within this note     Time User Action Codes Description Comment    1/22/2019 12:37 PM Schmeitzel, Brando Teaberry Add [R11 0] Nausea     1/22/2019 12:37 PM Schmeitzel, Brando Fabrizio Add [R10 9] Abdominal pain     1/22/2019 12:37 PM Schmeitzel, Brando Fabrizio Add [R19 7] Diarrhea     1/22/2019 12:37 PM Schmeitzel, Brando Fabrizio Add [E86 0] Dehydration     1/22/2019 12:37 PM Schmeitzel, Brando Fabrizio Add Allenlidia Samer  9XXA] Dialysis complication, initial encounter     1/22/2019 12:37 PM Schmeitzel, Brando Fabrizio Modify [R11 0] Nausea     1/22/2019 12:37 PM Schmeitzel, Brando Teaberry Modify [E86 0] Dehydration       ED Disposition     ED Disposition Condition Comment    Discharge  Raine Babb discharge to home/self care  Condition at discharge: Stable        Follow-up Information     Follow up With Specialties Details Why Contact Info Additional [facility] Schedule an appointment as soon as possible for a visit Recurrent abdominal pain, diarrhea [address] [phone]  [facility] [address], [phone]          Patient's Medications   Discharge Prescriptions    ONDANSETRON (ZOFRAN) 4 MG TABLET    Take 1 tablet (4 mg total) by mouth every 8 (eight) hours as needed for nausea or vomiting       Start Date: 1/22/2019 End Date: --       Order Dose: 4 mg       Quantity: 12 tablet    Refills: 0     No discharge procedures on file  ED Provider  Attending physically available and evaluated Raine Babb I managed the patient along with the ED Attending      Electronically Signed by         Mil Lu MD  01/22/19 1107

## 2019-01-22 NOTE — DISCHARGE INSTRUCTIONS
Acute Abdominal Pain   WHAT YOU NEED TO KNOW:   The cause of your abdominal pain may not be found  If a cause is found, treatment will depend on what the cause is  DISCHARGE INSTRUCTIONS:   Return to the emergency department if:   · You vomit blood or cannot stop vomiting  · You have blood in your bowel movement or it looks like tar  · You have bleeding from your rectum  · Your abdomen is larger than usual, more painful, and hard  · You have severe pain in your abdomen  · You stop passing gas and having bowel movements  · You feel weak, dizzy, or faint  Contact your healthcare provider if:   · You have a fever  · You have new signs and symptoms  · Your symptoms do not get better with treatment  · You have questions or concerns about your condition or care  Medicines  may be given to decrease pain, treat an infection, and manage your symptoms  Take your medicine as directed  Call your healthcare provider if you think your medicine is not helping or if you have side effects  Tell him if you are allergic to any medicine  Keep a list of the medicines, vitamins, and herbs you take  Include the amounts, and when and why you take them  Bring the list or the pill bottles to follow-up visits  Carry your medicine list with you in case of an emergency  Manage your symptoms:   · Apply heat  on your abdomen for 20 to 30 minutes every 2 hours for as many days as directed  Heat helps decrease pain and muscle spasms  · Manage your stress  Stress may cause abdominal pain  Your healthcare provider may recommend relaxation techniques and deep breathing exercises to help decrease your stress  Your healthcare provider may recommend you talk to someone about your stress or anxiety, such as a counselor or a trusted friend  Get plenty of sleep and exercise regularly  · Limit or do not drink alcohol  Alcohol can make your abdominal pain worse   Ask your healthcare provider if it is safe for you to drink alcohol  Also ask how much is safe for you to drink  · Do not smoke  Nicotine and other chemicals in cigarettes can damage your esophagus and stomach  Ask your healthcare provider for information if you currently smoke and need help to quit  E-cigarettes or smokeless tobacco still contain nicotine  Talk to your healthcare provider before you use these products  Make changes to the food you eat as directed:  Do not eat foods that cause abdominal pain or other symptoms  Eat small meals more often  · Eat more high-fiber foods if you are constipated  High-fiber foods include fruits, vegetables, whole-grain foods, and legumes  · Do not eat foods that cause gas if you have bloating  Examples include broccoli, cabbage, and cauliflower  Do not drink soda or carbonated drinks, because these may also cause gas  · Do not eat foods or drinks that contain sorbitol or fructose if you have diarrhea and bloating  Some examples are fruit juices, candy, jelly, and sugar-free gum  · Do not eat high-fat foods, such as fried foods, cheeseburgers, hot dogs, and desserts  · Limit or do not drink caffeine  Caffeine may make symptoms, such as heart burn or nausea, worse  · Drink plenty of liquids to prevent dehydration from diarrhea or vomiting  Ask your healthcare provider how much liquid to drink each day and which liquids are best for you  Follow up with your healthcare provider as directed:  Write down your questions so you remember to ask them during your visits  © 2017 [employer] Information is for End User's use only and may not be sold, redistributed or otherwise used for commercial purposes  All illustrations and images included in CareNotes® are the copyrighted property of [employer]  or [employer] Weinstein  The above information is an  only  It is not intended as medical advice for individual conditions or treatments   Talk to your doctor, nurse or pharmacist before following any medical regimen to see if it is safe and effective for you

## 2019-01-24 LAB
CAMPYLOBACTER DNA SPEC NAA+PROBE: NORMAL
SALMONELLA DNA SPEC QL NAA+PROBE: NORMAL
SHIGA TOXIN STX GENE SPEC NAA+PROBE: NORMAL
SHIGELLA DNA SPEC QL NAA+PROBE: NORMAL

## 2019-01-29 RX ORDER — TRAMADOL HYDROCHLORIDE 50 MG/1
TABLET ORAL
Refills: 0 | COMMUNITY
Start: 2018-12-10 | End: 2019-04-18

## 2019-01-29 RX ORDER — SODIUM POLYSTYRENE SULFONATE 15 G/60ML
SUSPENSION ORAL; RECTAL
Refills: 0 | COMMUNITY
Start: 2018-12-10

## 2019-01-29 RX ORDER — BLOOD SUGAR DIAGNOSTIC
STRIP MISCELLANEOUS 4 TIMES DAILY
Refills: 0 | Status: ON HOLD | COMMUNITY
Start: 2019-01-04 | End: 2019-04-27 | Stop reason: CLARIF

## 2019-01-29 RX ORDER — TORSEMIDE 100 MG/1
100 TABLET ORAL EVERY 12 HOURS
Refills: 0 | Status: ON HOLD | COMMUNITY
Start: 2019-01-02 | End: 2021-11-13 | Stop reason: SDUPTHER

## 2019-02-08 NOTE — TELEPHONE ENCOUNTER
Pt called stating she has been waiting for 3 weeks for her orthotics to come in and she hasn't heard anything from the [employer] rep  Pt stated she received a pair but they were to big so there is supposed to be an order for a new pair      Pt is very upset and would like a call back with a status     [phone]

## 2019-02-18 ENCOUNTER — TELEPHONE (OUTPATIENT)
Dept: OBGYN CLINIC | Facility: CLINIC | Age: 54
End: 2019-02-18

## 2019-02-28 ENCOUNTER — TELEPHONE (OUTPATIENT)
Dept: GASTROENTEROLOGY | Facility: CLINIC | Age: 54
End: 2019-02-28

## 2019-02-28 NOTE — TELEPHONE ENCOUNTER
New patient    That needs to be seen sooner due to abd pain, diarrhea, vomiting---please assist      CALL BACK # [phone]

## 2019-03-05 ENCOUNTER — HOSPITAL ENCOUNTER (EMERGENCY)
Facility: HOSPITAL | Age: 54
Discharge: HOME | End: 2019-03-05
Attending: EMERGENCY MEDICINE | Admitting: EMERGENCY MEDICINE
Payer: MEDICARE

## 2019-03-05 ENCOUNTER — APPOINTMENT (EMERGENCY)
Dept: RADIOLOGY | Facility: HOSPITAL | Age: 54
End: 2019-03-05
Payer: MEDICARE

## 2019-03-05 ENCOUNTER — OFFICE VISIT (OUTPATIENT)
Dept: GASTROENTEROLOGY | Facility: CLINIC | Age: 54
End: 2019-03-05
Payer: MEDICARE

## 2019-03-05 VITALS
BODY MASS INDEX: 42.45 KG/M2 | HEART RATE: 80 BPM | WEIGHT: 263 LBS | TEMPERATURE: 97.6 F | RESPIRATION RATE: 16 BRPM | OXYGEN SATURATION: 100 % | SYSTOLIC BLOOD PRESSURE: 138 MMHG | DIASTOLIC BLOOD PRESSURE: 88 MMHG

## 2019-03-05 VITALS
TEMPERATURE: 96 F | BODY MASS INDEX: 42.11 KG/M2 | HEIGHT: 66 IN | SYSTOLIC BLOOD PRESSURE: 150 MMHG | HEART RATE: 77 BPM | DIASTOLIC BLOOD PRESSURE: 75 MMHG | WEIGHT: 262 LBS

## 2019-03-05 DIAGNOSIS — S06.0X0A CONCUSSION WITHOUT LOSS OF CONSCIOUSNESS, INITIAL ENCOUNTER: Primary | ICD-10-CM

## 2019-03-05 DIAGNOSIS — M25.561 RIGHT KNEE PAIN: ICD-10-CM

## 2019-03-05 DIAGNOSIS — Z01.419 WELL WOMAN EXAM WITH ROUTINE GYNECOLOGICAL EXAM: ICD-10-CM

## 2019-03-05 DIAGNOSIS — Z12.11 COLON CANCER SCREENING: ICD-10-CM
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
## 2019-03-05 DIAGNOSIS — Z12.11 SCREENING FOR COLON CANCER: ICD-10-CM

## 2019-03-05 DIAGNOSIS — M62.838 CERVICAL PARASPINOUS MUSCLE SPASM: ICD-10-CM

## 2019-03-05 DIAGNOSIS — K21.9 GASTROESOPHAGEAL REFLUX DISEASE, ESOPHAGITIS PRESENCE NOT SPECIFIED: Primary | ICD-10-CM

## 2019-03-05 DIAGNOSIS — S16.1XXA STRAIN OF CERVICAL PORTION OF RIGHT TRAPEZIUS MUSCLE: ICD-10-CM

## 2019-03-05 PROCEDURE — 99214 OFFICE O/P EST MOD 30 MIN: CPT | Performed by: INTERNAL MEDICINE

## 2019-03-05 PROCEDURE — 70450 CT HEAD/BRAIN W/O DYE: CPT

## 2019-03-05 PROCEDURE — 99284 EMERGENCY DEPT VISIT MOD MDM: CPT

## 2019-03-05 RX ORDER — DORZOLAMIDE HYDROCHLORIDE AND TIMOLOL MALEATE 20; 5 MG/ML; MG/ML
SOLUTION/ DROPS OPHTHALMIC
Refills: 0 | COMMUNITY
Start: 2019-02-01 | End: 2021-06-11 | Stop reason: HOSPADM

## 2019-03-05 RX ORDER — ATROPINE SULFATE 10 MG/ML
SOLUTION/ DROPS OPHTHALMIC
Refills: 0 | COMMUNITY
Start: 2019-02-04 | End: 2019-08-31 | Stop reason: HOSPADM

## 2019-03-05 RX ORDER — METHOCARBAMOL 500 MG/1
500 TABLET, FILM COATED ORAL ONCE
Status: COMPLETED | OUTPATIENT
Start: 2019-03-05 | End: 2019-03-05

## 2019-03-05 RX ORDER — LIDOCAINE 50 MG/G
1 PATCH TOPICAL ONCE
Status: DISCONTINUED | OUTPATIENT
Start: 2019-03-05 | End: 2019-03-05 | Stop reason: HOSPADM

## 2019-03-05 RX ORDER — ACETAMINOPHEN 325 MG/1
650 TABLET ORAL ONCE
Status: COMPLETED | OUTPATIENT
Start: 2019-03-05 | End: 2019-03-05

## 2019-03-05 RX ADMIN — LIDOCAINE 1 PATCH: 50 PATCH CUTANEOUS at 20:33

## 2019-03-05 RX ADMIN — ACETAMINOPHEN 650 MG: 325 TABLET ORAL at 20:33

## 2019-03-05 RX ADMIN — METHOCARBAMOL 500 MG: 500 TABLET, FILM COATED ORAL at 21:29
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
## 2019-03-05 NOTE — PATIENT INSTRUCTIONS
EGD Colon scheduled 3/12/19 with Dr Samuel Pollock instructions given in office ok'd by Gianni Singh 8:00 AM room 4
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
## 2019-03-06 NOTE — ED ATTENDING ATTESTATION
Kar Harris MD, saw and evaluated the patient  I have discussed the patient with the resident/non-physician practitioner and agree with the resident's/non-physician practitioner's findings, Plan of Care, and MDM as documented in the resident's/non-physician practitioner's note, except where noted  All available labs and Radiology studies were reviewed  I was present for key portions of any procedure(s) performed by the resident/non-physician practitioner and I was immediately available to provide assistance  At this point I agree with the current assessment done in the Emergency Department    I have conducted an independent evaluation of this patient a history and physical is as follows:     [place] bus 3 days Hit head on panel behind   Now has ha for 3 days that wake her from sleep Pt co neck and knee pain PE: alert nad r lateral neck tenderness heart reg lungs clear abd soft nontender R knee ne effusion from MDM: will ct head   Critical Care Time  Procedures

## 2019-03-07 ENCOUNTER — TELEPHONE (OUTPATIENT)
Dept: GASTROENTEROLOGY | Facility: CLINIC | Age: 54
End: 2019-03-07

## 2019-03-07 NOTE — TELEPHONE ENCOUNTER
Left message for patient to hold coumadin 5 days prior to procedure 3/12/19 as per Dr Perla aClderon

## 2019-03-11 ENCOUNTER — ANESTHESIA EVENT (OUTPATIENT)
Dept: GASTROENTEROLOGY | Facility: HOSPITAL | Age: 54
End: 2019-03-11

## 2019-03-11 RX ORDER — SODIUM CHLORIDE 9 MG/ML
125 INJECTION, SOLUTION INTRAVENOUS CONTINUOUS
Status: CANCELLED | OUTPATIENT
Start: 2019-03-11

## 2019-03-12 ENCOUNTER — HOSPITAL ENCOUNTER (OUTPATIENT)
Facility: HOSPITAL | Age: 54
Setting detail: OUTPATIENT SURGERY
End: 2019-03-12
Attending: INTERNAL MEDICINE | Admitting: INTERNAL MEDICINE
Payer: MEDICARE

## 2019-03-12 ENCOUNTER — ANESTHESIA (OUTPATIENT)
Dept: GASTROENTEROLOGY | Facility: HOSPITAL | Age: 54
End: 2019-03-12

## 2019-03-12 NOTE — H&P
History and Physical -  [facility]  Nikky Mills 48 y o  female MRN: [id number]                  HPI: Nikky Mills is a 48y o  year old female who presents for EGD/colonoscopy for CRC screenign and pre transplant evaluation      REVIEW OF SYSTEMS: Per the HPI, and otherwise unremarkable      Historical Information   Past Medical History:   Diagnosis Date    Abnormal uterine bleeding (AUB)     [facility] mellitus [facility] [address] [facility]     Disease of thyroid gland     DVT (deep venous thrombosis) (HCC)     End stage kidney disease (HCC)     Foot ulcer due to secondary DM ([facility] )     Hypertension     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     right eye    Morbid obesity ([facility] )     Reflux esophagitis     Thrombophlebitis of saphenous vein     Warfarin anticoagulation      Past Surgical History:   Procedure Laterality Date    ARTERIOVENOUS GRAFT PLACEMENT      CERVICAL BIOPSY  W/ LOOP ELECTRODE EXCISION       SECTION      DIALYSIS FISTULA CREATION      DILATION AND CURETTAGE OF UTERUS      ENDOMETRIAL ABLATION W/ NOVASURE      EYE SURGERY      PERICARDIUM SURGERY      VT LAPAROSCOPY W TOT HYSTERECT UTERUS 250 GRAM OR LESS N/A 2016    Procedure: HYSTERECTOMY LAPAROSCOPIC TOTAL ([address] [facility] [address]), with bilateral salpingectomy;  Surgeon: Aldair Bonner DO;  Location: [facility];  Service: Gynecology    THROMBECTOMY / ARTERIOVENOUS GRAFT REVISION      TOE SURGERY Right     removal of the 4th toe     Social History   Social History     Substance and Sexual Activity   Alcohol Use No     Social History     Substance and Sexual Activity   Drug Use No     Social History     Tobacco Use   Smoking Status Never Smoker   Smokeless Tobacco Never Used     Family History   Problem Relation Age of Onset    Heart disease Family     Diabetes Family     Heart disease Mother     Cancer Brother        Meds/Allergies     Medications Prior to Admission   Medication    ACCU-CHEK REUBEN PLUS test strip   Sesar Lin acetaminophen (TYLENOL) 325 mg tablet    ALPRAZolam (XANAX) 0 25 mg tablet    atorvastatin (LIPITOR) 20 mg tablet    atropine (ISOPTO ATROPINE) 1 % ophthalmic solution    atropine 1 % ophthalmic ointment    B Complex-C-Folic Acid (TRIPHROCAPS) 1 MG CAPS    brimonidine (ALPHAGAN P) 0 15 % ophthalmic solution    CALCIUM CARBONATE PO    carvedilol (COREG) 25 mg tablet    cholecalciferol (VITAMIN D3) 1,000 units tablet    cinacalcet (SENSIPAR) 30 mg tablet    cloNIDine (CATAPRES) 0 1 mg tablet    cyclobenzaprine (FLEXERIL) 10 mg tablet    diclofenac sodium (VOLTAREN) 1 %    diclofenac sodium (VOLTAREN) 1 %    diphenhydrAMINE (BENADRYL) 25 mg capsule    dorzolamide-timolol (COSOPT) 22 3-6 8 MG/ML ophthalmic solution    Ferric Citrate (AURYXIA) 1  MG(Fe) TABS    gabapentin (NEURONTIN) 100 mg capsule    insulin aspart (NovoLOG) 100 units/mL injection    insulin glargine (LANTUS) 100 units/mL subcutaneous injection    KIONEX 15 GM/60ML suspension    latanoprost (XALATAN) 0 005 % ophthalmic solution    levothyroxine 50 mcg tablet    LIDOCAINE EX    losartan (COZAAR) 25 mg tablet    magnesium oxide (MAG-OX) 400 mg    Melatonin 3 MG CAPS    methazolamide (NEPTAZANE) 25 MG tablet    omeprazole (PriLOSEC) 20 mg delayed release capsule    ondansetron (ZOFRAN) 4 mg tablet    ondansetron (ZOFRAN) 4 mg tablet    oxyCODONE (ROXICODONE) 5 mg immediate release tablet    PARoxetine (PAXIL) 10 mg tablet    prednisoLONE acetate (PRED FORTE) 1 % ophthalmic suspension    sertraline (ZOLOFT) 50 mg tablet    TIMOLOL MALEATE OP    torsemide (DEMADEX) 100 mg tablet    torsemide (DEMADEX) 20 mg tablet    traMADol (ULTRAM) 50 mg tablet    VIGAMOX 0 5 % ophthalmic solution    warfarin (COUMADIN) 3 mg tablet    warfarin (COUMADIN) 5 mg tablet       Allergies   Allergen Reactions    Iodinated Diagnostic Agents Itching       Objective     Last menstrual period 02/12/2016        PHYSICAL EXAM    Gen: NAD  CV: RRR  CHEST: Clear  ABD: soft, NT/ND  EXT: no edema      ASSESSMENT/PLAN:  This is a 48y o  year old female here for EGD/colonoscopy , the patient is stable and optimized for the procedure, we reviewed risk and benefits   Risk include but not limited to infection, bleeding, perforation and missing a lesion    PLAN:   Procedure: EGD/Colonoscopy

## 2019-03-13 ENCOUNTER — ANESTHESIA (OUTPATIENT)
Dept: GASTROENTEROLOGY | Facility: HOSPITAL | Age: 54
End: 2019-03-13
Payer: MEDICARE

## 2019-03-13 ENCOUNTER — ANESTHESIA EVENT (OUTPATIENT)
Dept: GASTROENTEROLOGY | Facility: HOSPITAL | Age: 54
End: 2019-03-13
Payer: MEDICARE

## 2019-03-13 ENCOUNTER — HOSPITAL ENCOUNTER (OUTPATIENT)
Facility: HOSPITAL | Age: 54
Setting detail: OUTPATIENT SURGERY
Discharge: HOME | End: 2019-03-13
Attending: INTERNAL MEDICINE | Admitting: INTERNAL MEDICINE
Payer: MEDICARE

## 2019-03-13 VITALS
SYSTOLIC BLOOD PRESSURE: 147 MMHG | OXYGEN SATURATION: 98 % | DIASTOLIC BLOOD PRESSURE: 63 MMHG | BODY MASS INDEX: 40.18 KG/M2 | HEART RATE: 76 BPM | TEMPERATURE: 97.5 F | WEIGHT: 250 LBS | HEIGHT: 66 IN | RESPIRATION RATE: 16 BRPM

## 2019-03-13 DIAGNOSIS — Z12.11 SPECIAL SCREENING FOR MALIGNANT NEOPLASMS, COLON: ICD-10-CM

## 2019-03-13 LAB — GLUCOSE SERPL-MCNC: 180 MG/DL (ref 65–140)

## 2019-03-13 PROCEDURE — 88305 TISSUE EXAM BY PATHOLOGIST: CPT | Performed by: PATHOLOGY

## 2019-03-13 PROCEDURE — 82948 REAGENT STRIP/BLOOD GLUCOSE: CPT

## 2019-03-13 PROCEDURE — 43239 EGD BIOPSY SINGLE/MULTIPLE: CPT | Performed by: INTERNAL MEDICINE

## 2019-03-13 PROCEDURE — 45385 COLONOSCOPY W/LESION REMOVAL: CPT | Performed by: INTERNAL MEDICINE

## 2019-03-13 RX ORDER — LIDOCAINE HYDROCHLORIDE 10 MG/ML
0.5 INJECTION, SOLUTION INFILTRATION; PERINEURAL ONCE AS NEEDED
Status: DISCONTINUED | OUTPATIENT
Start: 2019-03-13 | End: 2019-03-13 | Stop reason: HOSPADM

## 2019-03-13 RX ORDER — PROPOFOL 10 MG/ML
INJECTION, EMULSION INTRAVENOUS AS NEEDED
Status: DISCONTINUED | OUTPATIENT
Start: 2019-03-13 | End: 2019-03-13 | Stop reason: SURG

## 2019-03-13 RX ORDER — SODIUM CHLORIDE 9 MG/ML
50 INJECTION, SOLUTION INTRAVENOUS CONTINUOUS
Status: DISCONTINUED | OUTPATIENT
Start: 2019-03-13 | End: 2019-03-13 | Stop reason: HOSPADM

## 2019-03-13 RX ORDER — LIDOCAINE HYDROCHLORIDE 10 MG/ML
INJECTION, SOLUTION INFILTRATION; PERINEURAL AS NEEDED
Status: DISCONTINUED | OUTPATIENT
Start: 2019-03-13 | End: 2019-03-13 | Stop reason: SURG

## 2019-03-13 RX ADMIN — PROPOFOL 25 MG: 10 INJECTION, EMULSION INTRAVENOUS at 12:09

## 2019-03-13 RX ADMIN — PROPOFOL 25 MG: 10 INJECTION, EMULSION INTRAVENOUS at 12:17

## 2019-03-13 RX ADMIN — PROPOFOL 25 MG: 10 INJECTION, EMULSION INTRAVENOUS at 12:08

## 2019-03-13 RX ADMIN — LIDOCAINE HYDROCHLORIDE 100 MG: 10 INJECTION, SOLUTION INFILTRATION; PERINEURAL at 12:03

## 2019-03-13 RX ADMIN — PROPOFOL 50 MG: 10 INJECTION, EMULSION INTRAVENOUS at 12:18

## 2019-03-13 RX ADMIN — PROPOFOL 25 MG: 10 INJECTION, EMULSION INTRAVENOUS at 12:07

## 2019-03-13 RX ADMIN — PROPOFOL 25 MG: 10 INJECTION, EMULSION INTRAVENOUS at 12:15

## 2019-03-13 RX ADMIN — PROPOFOL 50 MG: 10 INJECTION, EMULSION INTRAVENOUS at 12:03

## 2019-03-13 RX ADMIN — PROPOFOL 30 MG: 10 INJECTION, EMULSION INTRAVENOUS at 12:28

## 2019-03-13 RX ADMIN — PROPOFOL 25 MG: 10 INJECTION, EMULSION INTRAVENOUS at 12:05

## 2019-03-13 RX ADMIN — SODIUM CHLORIDE 50 ML/HR: 0.9 INJECTION, SOLUTION INTRAVENOUS at 11:50

## 2019-03-13 RX ADMIN — PROPOFOL 25 MG: 10 INJECTION, EMULSION INTRAVENOUS at 12:06

## 2019-03-13 RX ADMIN — PROPOFOL 30 MG: 10 INJECTION, EMULSION INTRAVENOUS at 12:32

## 2019-03-13 RX ADMIN — PROPOFOL 25 MG: 10 INJECTION, EMULSION INTRAVENOUS at 12:11

## 2019-03-13 RX ADMIN — PROPOFOL 50 MG: 10 INJECTION, EMULSION INTRAVENOUS at 12:04

## 2019-03-13 NOTE — DISCHARGE INSTRUCTIONS
OPERATIVE REPORT  PATIENT NAME: Diana Rivers    :  1965  MRN: [id number]  Pt Location: [facility] 03    SURGERY DATE: 3/13/2019    Surgeon(s) and Role:     * Kaila Sabillon MD - Primary    ESOPHAGOGASTRODUODENOSCOPY    PROCEDURE: EGD    SEDATION: Monitored anesthesia care, check anesthesia records    ASA Class: 3    INDICATIONS:  Gastroesophageal reflux disease    CONSENT:  Informed consent was obtained for the procedure, including sedation after explaining the risks and benefits of the procedure  Risks including but not limited to bleeding, perforation, infection, and missed lesion  PREPARATION:   Telemetry, pulse oximetry, blood pressure were monitored throughout the procedure  Patient was identified by myself both verbally and by visual inspection of ID band  DESCRIPTION:   Patient was placed in the left lateral decubitus position and was sedated with the above medication  The gastroscope was introduced in to the oropharynx and the esophagus was intubated under direct visualization  Scope was passed down the esophagus up to 2nd part of the duodenum  A careful inspection was made as the gastroscope was withdrawn, including a retroflexed view of the stomach; findings and interventions are described below  FINDINGS:    #1  Esophagus- normal esophagus and GE junction  #2  Stomach- few fundic gland polyps noted  Biopsies taken using cold biopsy forceps    #3  Duodenum- normal bulb and 2nd portion of the duodenum         IMPRESSIONS:      Few fundic gland polyps in the stomach biopsies taken  Normal esophagus and duodenum  RECOMMENDATIONS:     Continue reflux precautions  Follow up biopsy  Colonoscopy to follow  COMPLICATIONS:  None; patient tolerated the procedure well      SPECIMENS:    ID Type Source Tests Collected by Time Destination   1 : gastric polyp Tissue Polyp, Stomach/Small Intestine TISSUE EXAM Kaila Sabillon MD 3/13/2019 1208        ESTIMATED BLOOD LOSS:  Minimal      SIGNATURE: Lisa Bermudez MD  DATE: 2019  TIME: 12:09 PM      OPERATIVE REPORT  PATIENT NAME: Dameon Romero    :  1965  MRN: [id number]  Pt Location: [facility]    SURGERY DATE: 3/13/2019    Surgeon(s) and Role:     * Lisa Bermudez MD - Primary    Colonoscopy Procedure Note    Procedure: Colonoscopy    Sedation: Monitored anesthesia care, check anesthesia records      ASA Class: 3    INDICATIONS:  Colon cancer screening    POST-OP DIAGNOSIS: See the impression below    Procedure Details     Prior colonoscopy: No prior colonoscopy  Informed consent was obtained for the procedure, including sedation  Risks of perforation, hemorrhage, adverse drug reaction and aspiration were discussed  The patient was placed in the left lateral decubitus position  Based on the pre-procedure assessment, including review of the patient's medical history, medications, allergies, and review of systems, she had been deemed to be an appropriate candidate for conscious sedation; she was therefore sedated with the medications listed below  The patient was monitored continuously with telemetry, pulse oximetry, blood pressure monitoring, and direct observations  A rectal examination was performed  The colonoscope was inserted into the rectum and advanced under direct vision to the cecum, which was identified by the ileocecal valve and appendiceal orifice  The quality of the colonic preparation was good  A careful inspection was made as the colonoscope was withdrawn, including a retroflexed view of the rectum; findings and interventions are described below  Findings:  Colonoscopy was performed distal endograft attachment  A 10 mm sessile polyp was removed from transverse colon using snare cautery  The polyp was retrieved  Another 5 mm polyp was removed from transverse colon using cold snare  Two small polyps each measuring 5 mm were removed from sigmoid colon using cold snare    A 6 mm flat polyp was removed from sigmoid colon using snare cautery  Mild diverticulosis noted in sigmoid colon  Medium-sized internal hemorrhoids  Complications: None; patient tolerated the procedure well  Impression:    Multiple polyps removed as outlined above  Mild diverticulosis in the sigmoid colon  Medium-sized internal hemorrhoids  Recommendations:  Repeat colonoscopy is recommended in 3 years because patient has more than 3 polyps  Follow-up biopsy result  High-fiber diet  If you have abdominal pain, bleeding or fever call my office at [phone]  COMPLICATIONS:  None; patient tolerated the procedure well      SPECIMENS:    ID Type Source Tests Collected by Time Destination   1 : gastric polyp Tissue Polyp, Stomach/Small Intestine TISSUE EXAM Brenda Rowe MD 3/13/2019 1208    2 : transverse colon; cold snare  Tissue Polyp, Colorectal TISSUE EXAM Brenda Rowe MD 3/13/2019 1218    3 : transverse colon;hot snare Tissue Polyp, Colorectal TISSUE EXAM Brenda Rowe MD 3/13/2019 1225    4 : descending colon, cold snare Tissue Polyp, Colorectal TISSUE EXAM Brenda Rowe MD 3/13/2019 1233    5 : sigmoid colon x 2, cold snare Tissue Polyp, Colorectal TISSUE EXAM Brenda Rowe MD 3/13/2019 1236    6 : sigmoid polyp, cold snare Tissue Polyp, Colorectal TISSUE EXAM Brenda Rowe MD 3/13/2019 1239        ESTIMATED BLOOD LOSS:  Minimal    SIGNATURE: Brenda Rowe MD  DATE: March 13, 2019  TIME: 12:42 PM

## 2019-03-13 NOTE — ANESTHESIA POSTPROCEDURE EVALUATION
Post-Op Assessment Note    CV Status:  Stable  Pain Score: 0    Pain management: adequate     Mental Status:  Alert and awake   Hydration Status:  Euvolemic   PONV Controlled:  Controlled   Airway Patency:  Patent   Post Op Vitals Reviewed: Yes      Staff: CRNA           BP     Temp      Pulse     Resp      SpO2

## 2019-03-13 NOTE — OP NOTE
OPERATIVE REPORT  PATIENT NAME: Bonnie Rivera    :  1965  MRN: [id number]  Pt Location: [facility]    SURGERY DATE: 3/13/2019    Surgeon(s) and Role:     * Alcira Berry MD - Primary    ESOPHAGOGASTRODUODENOSCOPY    PROCEDURE: EGD    SEDATION: Monitored anesthesia care, check anesthesia records    ASA Class: 3    INDICATIONS:  Gastroesophageal reflux disease    CONSENT:  Informed consent was obtained for the procedure, including sedation after explaining the risks and benefits of the procedure  Risks including but not limited to bleeding, perforation, infection, and missed lesion  PREPARATION:   Telemetry, pulse oximetry, blood pressure were monitored throughout the procedure  Patient was identified by myself both verbally and by visual inspection of ID band  DESCRIPTION:   Patient was placed in the left lateral decubitus position and was sedated with the above medication  The gastroscope was introduced in to the oropharynx and the esophagus was intubated under direct visualization  Scope was passed down the esophagus up to 2nd part of the duodenum  A careful inspection was made as the gastroscope was withdrawn, including a retroflexed view of the stomach; findings and interventions are described below  FINDINGS:    #1  Esophagus- normal esophagus and GE junction  #2  Stomach- few fundic gland polyps noted  Biopsies taken using cold biopsy forceps    #3  Duodenum- normal bulb and 2nd portion of the duodenum         IMPRESSIONS:      Few fundic gland polyps in the stomach biopsies taken  Normal esophagus and duodenum  RECOMMENDATIONS:     Continue reflux precautions  Follow up biopsy  Colonoscopy to follow  COMPLICATIONS:  None; patient tolerated the procedure well      SPECIMENS:    ID Type Source Tests Collected by Time Destination   1 : gastric polyp Tissue Polyp, Stomach/Small Intestine TISSUE EXAM Alcira Berry MD 3/13/2019 1208        ESTIMATED BLOOD LOSS:  Minimal      SIGNATURE: David Hollingsworth MD  DATE: March 13, 2019  TIME: 12:09 PM

## 2019-03-13 NOTE — H&P
History and Physical - [facility]  Josef William 48 y o  female MRN: [id number]    HPI: Josef William is a 48y o  year old female who presents for EGD and colonoscopy    She has GERD and colonoscopy for colon cancer screening      Review of Systems    Historical Information   Past Medical History:   Diagnosis Date    Abnormal uterine bleeding (AUB)     Anxiety     Diabetes mellitus ([facility]     Disease of thyroid gland     DVT (deep venous thrombosis) (HCC)     End stage kidney disease (HCC)     Foot ulcer due to secondary DM ([facility]     Hypertension     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[facility] )     right eye    Morbid obesity (HCC)     Panic attacks     Reflux esophagitis     Thrombophlebitis of saphenous vein     Warfarin anticoagulation      Past Surgical History:   Procedure Laterality Date    ARTERIOVENOUS GRAFT PLACEMENT      CERVICAL BIOPSY  W/ LOOP ELECTRODE EXCISION       SECTION      DIALYSIS FISTULA CREATION      DILATION AND CURETTAGE OF UTERUS      ENDOMETRIAL ABLATION W/ NOVASURE      EYE SURGERY      HYSTERECTOMY      PERICARDIUM SURGERY      NH LAPAROSCOPY W TOT HYSTERECT UTERUS 250 GRAM OR LESS N/A 2016    Procedure: HYSTERECTOMY LAPAROSCOPIC TOTAL ([address]), with bilateral salpingectomy;  Surgeon: Solo Palomino DO;  Location: [facility];  Service: Gynecology    THROMBECTOMY / ARTERIOVENOUS GRAFT REVISION      TOE SURGERY Right     removal of the 4th toe     Social History   Social History     Substance and Sexual Activity   Alcohol Use No     Social History     Substance and Sexual Activity   Drug Use No     Social History     Tobacco Use   Smoking Status Never Smoker   Smokeless Tobacco Never Used     Family History   Problem Relation Age of Onset    Heart disease Family     Diabetes Family     Heart disease Mother     Cancer Brother        Meds/Allergies     Medications Prior to Admission   Medication    acetaminophen (TYLENOL) 325 mg tablet    ALPRAZolam (XANAX) 0 25 mg tablet    atorvastatin (LIPITOR) 20 mg tablet    atropine (ISOPTO ATROPINE) 1 % ophthalmic solution    atropine 1 % ophthalmic ointment    B Complex-C-Folic Acid (TRIPHROCAPS) 1 MG CAPS    brimonidine (ALPHAGAN P) 0 15 % ophthalmic solution    CALCIUM CARBONATE PO    cholecalciferol (VITAMIN D3) 1,000 units tablet    cinacalcet (SENSIPAR) 30 mg tablet    diclofenac sodium (VOLTAREN) 1 %    diphenhydrAMINE (BENADRYL) 25 mg capsule    dorzolamide-timolol (COSOPT) 22 3-6 8 MG/ML ophthalmic solution    gabapentin (NEURONTIN) 100 mg capsule    latanoprost (XALATAN) 0 005 % ophthalmic solution    levothyroxine 50 mcg tablet    losartan (COZAAR) 25 mg tablet    Melatonin 3 MG CAPS    methazolamide (NEPTAZANE) 25 MG tablet    omeprazole (PriLOSEC) 20 mg delayed release capsule    PARoxetine (PAXIL) 10 mg tablet    prednisoLONE acetate (PRED FORTE) 1 % ophthalmic suspension    TIMOLOL MALEATE OP    torsemide (DEMADEX) 100 mg tablet    VIGAMOX 0 5 % ophthalmic solution    ACCU-CHEK REUBEN PLUS test strip    carvedilol (COREG) 25 mg tablet    cloNIDine (CATAPRES) 0 1 mg tablet    cyclobenzaprine (FLEXERIL) 10 mg tablet    diclofenac sodium (VOLTAREN) 1 %    Ferric Citrate (AURYXIA) 1  MG(Fe) TABS    insulin aspart (NovoLOG) 100 units/mL injection    insulin glargine (LANTUS) 100 units/mL subcutaneous injection    KIONEX 15 GM/60ML suspension    LIDOCAINE EX    magnesium oxide (MAG-OX) 400 mg    ondansetron (ZOFRAN) 4 mg tablet    ondansetron (ZOFRAN) 4 mg tablet    oxyCODONE (ROXICODONE) 5 mg immediate release tablet    sertraline (ZOLOFT) 50 mg tablet    torsemide (DEMADEX) 20 mg tablet    traMADol (ULTRAM) 50 mg tablet    warfarin (COUMADIN) 3 mg tablet    warfarin (COUMADIN) 5 mg tablet       Allergies   Allergen Reactions    Iodinated Diagnostic Agents Itching       Objective     Blood pressure (!) 183/67, pulse 78, temperature 97 5 °F (36 4 °C), temperature source Oral, resp  rate 18, height 5' 6" (1 676 m), weight 113 kg (250 lb), last menstrual period 02/12/2016, SpO2 97 %  PHYSICAL EXAM    Gen: NAD  CV: RRR  CHEST: Clear  ABD: soft, NT/ND  EXT: no edema  Neuro: AAO      ASSESSMENT/PLAN:  This is a 48y o  year old female here for EGD and colonoscopy  She has GERD  Colonoscopy is for colon cancer screening  Patient is undergoing evaluation for renal transplant      PLAN:   Procedure:  EGD and colonoscopy

## 2019-03-13 NOTE — ANESTHESIA PREPROCEDURE EVALUATION
Review of Systems/Medical History  Patient summary reviewed  Chart reviewed  No history of anesthetic complications     Cardiovascular  Exercise tolerance (METS): <4, Exercise comment: Tolerates hemodialysis without difficulty Hypertension , DVT   Pulmonary  Negative pulmonary ROS        GI/Hepatic    GERD ,        Kidney disease ESRD, Dialysis hemodialysis Date of last dialysis: 3/11/19,        Endo/Other  Diabetes poorly controlled , History of thyroid disease ,   Obesity  morbid obesity   GYN  Negative gynecology ROS          Hematology  Anemia ,  Coagulation disorder currently taking oral anticoagulants, Hypercoagulable state,    Musculoskeletal    Arthritis     Neurology  Negative neurology ROS      Psychology   Anxiety,              Physical Exam    Airway    Mallampati score: II  TM Distance: >3 FB  Neck ROM: full     Dental   No notable dental hx     Cardiovascular      Pulmonary      Other Findings        Anesthesia Plan  ASA Score- 4     Anesthesia Type- IV sedation with anesthesia with ASA Monitors  Additional Monitors:   Airway Plan:         Plan Factors- Patient instructed to abstain from smoking on day of procedure  Patient did not smoke on day of surgery  Induction-     Postoperative Plan-     Informed Consent- Anesthetic plan and risks discussed with patient  I personally reviewed this patient with the CRNA  Discussed and agreed on the Anesthesia Plan with the CRNA  Stacia Max

## 2019-04-03 ENCOUNTER — HOSPITAL ENCOUNTER (OUTPATIENT)
Dept: RADIOLOGY | Facility: HOSPITAL | Age: 54
Discharge: HOME | End: 2019-04-03
Payer: MEDICARE

## 2019-04-03 ENCOUNTER — TRANSCRIBE ORDERS (OUTPATIENT)
Dept: ADMISSIONS | Facility: HOSPITAL | Age: 54
End: 2019-04-03

## 2019-04-03 VITALS — BODY MASS INDEX: 40.18 KG/M2 | WEIGHT: 250 LBS | HEIGHT: 66 IN

## 2019-04-03 DIAGNOSIS — Z12.39 SCREENING BREAST EXAMINATION: ICD-10-CM

## 2019-04-03 DIAGNOSIS — Z01.419 WELL WOMAN EXAM WITH ROUTINE GYNECOLOGICAL EXAM: ICD-10-CM

## 2019-04-03 PROCEDURE — 77067 SCR MAMMO BI INCL CAD: CPT

## 2019-04-18 ENCOUNTER — CONSULT (OUTPATIENT)
Dept: NEUROLOGY | Facility: CLINIC | Age: 54
End: 2019-04-18
Payer: MEDICARE

## 2019-04-18 ENCOUNTER — TRANSCRIBE ORDERS (OUTPATIENT)
Dept: LAB | Facility: CLINIC | Age: 54
End: 2019-04-18

## 2019-04-18 ENCOUNTER — TELEPHONE (OUTPATIENT)
Dept: OBGYN CLINIC | Facility: CLINIC | Age: 54
End: 2019-04-18

## 2019-04-18 VITALS
WEIGHT: 260 LBS | HEART RATE: 80 BPM | SYSTOLIC BLOOD PRESSURE: 140 MMHG | BODY MASS INDEX: 41.78 KG/M2 | HEIGHT: 66 IN | DIASTOLIC BLOOD PRESSURE: 68 MMHG

## 2019-04-18 DIAGNOSIS — M54.2 CERVICALGIA: ICD-10-CM

## 2019-04-18 DIAGNOSIS — S13.4XXD WHIPLASH INJURY TO NECK, SUBSEQUENT ENCOUNTER: ICD-10-CM

## 2019-04-18 DIAGNOSIS — S06.0X0D CONCUSSION WITHOUT LOSS OF CONSCIOUSNESS, SUBSEQUENT ENCOUNTER: Primary | ICD-10-CM

## 2019-04-18 PROBLEM — D32.9 MENINGIOMA (HCC): Status: ACTIVE | Noted: 2019-04-18

## 2019-04-18 PROCEDURE — 99215 OFFICE O/P EST HI 40 MIN: CPT | Performed by: PSYCHIATRY & NEUROLOGY

## 2019-04-18 RX ORDER — SERTRALINE HYDROCHLORIDE 25 MG/1
25 TABLET, FILM COATED ORAL DAILY
Status: ON HOLD | COMMUNITY
End: 2019-04-27 | Stop reason: CLARIF

## 2019-04-18 RX ORDER — PANTOPRAZOLE SODIUM 40 MG/1
40 TABLET, DELAYED RELEASE ORAL DAILY
COMMUNITY
End: 2019-05-01

## 2019-04-18 RX ORDER — TORSEMIDE 100 MG/1
100 TABLET ORAL DAILY
Status: ON HOLD | COMMUNITY
End: 2019-04-27 | Stop reason: CLARIF

## 2019-04-22 ENCOUNTER — TELEPHONE (OUTPATIENT)
Dept: MAMMOGRAPHY | Facility: CLINIC | Age: 54
End: 2019-04-22

## 2019-04-26 ENCOUNTER — APPOINTMENT (EMERGENCY)
Dept: RADIOLOGY | Facility: HOSPITAL | Age: 54
End: 2019-04-26
Payer: MEDICARE

## 2019-04-26 ENCOUNTER — HOSPITAL ENCOUNTER (OUTPATIENT)
Facility: HOSPITAL | Age: 54
Setting detail: OBSERVATION
Discharge: HOME | End: 2019-04-28
Attending: EMERGENCY MEDICINE | Admitting: INTERNAL MEDICINE
Payer: MEDICARE

## 2019-04-26 DIAGNOSIS — F41.9 ANXIETY DISORDER: ICD-10-CM

## 2019-04-26 DIAGNOSIS — N18.6 ESRD ON HEMODIALYSIS (HCC): Chronic | ICD-10-CM

## 2019-04-26 DIAGNOSIS — R07.9 CHEST PAIN: Primary | ICD-10-CM

## 2019-04-26 DIAGNOSIS — Z99.2 ESRD ON HEMODIALYSIS (HCC): Chronic | ICD-10-CM

## 2019-04-26 PROBLEM — R11.0 NAUSEA: Status: ACTIVE | Noted: 2019-04-26

## 2019-04-26 LAB
ALBUMIN SERPL BCP-MCNC: 3.3 G/DL (ref 3.5–5)
ALP SERPL-CCNC: 121 U/L (ref 46–116)
ALT SERPL W P-5'-P-CCNC: 8 U/L (ref 12–78)
ANION GAP SERPL CALCULATED.3IONS-SCNC: 6 MMOL/L (ref 4–13)
APTT PPP: 34 SECONDS (ref 26–38)
AST SERPL W P-5'-P-CCNC: 9 U/L (ref 5–45)
BACTERIA UR QL AUTO: ABNORMAL /HPF
BASOPHILS # BLD AUTO: 0.04 THOUSANDS/ΜL (ref 0–0.1)
BASOPHILS NFR BLD AUTO: 1 % (ref 0–1)
BILIRUB SERPL-MCNC: 0.31 MG/DL (ref 0.2–1)
BILIRUB UR QL STRIP: NEGATIVE
BUN SERPL-MCNC: 53 MG/DL (ref 5–25)
CALCIUM SERPL-MCNC: 8.5 MG/DL (ref 8.3–10.1)
CHLORIDE SERPL-SCNC: 101 MMOL/L (ref 100–108)
CLARITY UR: CLEAR
CO2 SERPL-SCNC: 29 MMOL/L (ref 21–32)
COLOR UR: YELLOW
CREAT SERPL-MCNC: 7.78 MG/DL (ref 0.6–1.3)
EOSINOPHIL # BLD AUTO: 0.12 THOUSAND/ΜL (ref 0–0.61)
EOSINOPHIL NFR BLD AUTO: 2 % (ref 0–6)
ERYTHROCYTE [DISTWIDTH] IN BLOOD BY AUTOMATED COUNT: 12.3 % (ref 11.6–15.1)
GFR SERPL CREATININE-BSD FRML MDRD: 5 ML/MIN/1.73SQ M
GLUCOSE SERPL-MCNC: 139 MG/DL (ref 65–140)
GLUCOSE UR STRIP-MCNC: ABNORMAL MG/DL
HCT VFR BLD AUTO: 28.8 % (ref 34.8–46.1)
HGB BLD-MCNC: 9.3 G/DL (ref 11.5–15.4)
HGB UR QL STRIP.AUTO: ABNORMAL
IMM GRANULOCYTES # BLD AUTO: 0.03 THOUSAND/UL (ref 0–0.2)
IMM GRANULOCYTES NFR BLD AUTO: 0 % (ref 0–2)
INR PPP: 2.45 (ref 0.86–1.17)
KETONES UR STRIP-MCNC: NEGATIVE MG/DL
LACTATE SERPL-SCNC: 0.8 MMOL/L (ref 0.5–2)
LEUKOCYTE ESTERASE UR QL STRIP: ABNORMAL
LYMPHOCYTES # BLD AUTO: 1.46 THOUSANDS/ΜL (ref 0.6–4.47)
LYMPHOCYTES NFR BLD AUTO: 20 % (ref 14–44)
MCH RBC QN AUTO: 30.3 PG (ref 26.8–34.3)
MCHC RBC AUTO-ENTMCNC: 32.3 G/DL (ref 31.4–37.4)
MCV RBC AUTO: 94 FL (ref 82–98)
MONOCYTES # BLD AUTO: 0.68 THOUSAND/ΜL (ref 0.17–1.22)
MONOCYTES NFR BLD AUTO: 9 % (ref 4–12)
NEUTROPHILS # BLD AUTO: 4.91 THOUSANDS/ΜL (ref 1.85–7.62)
NEUTS SEG NFR BLD AUTO: 68 % (ref 43–75)
NITRITE UR QL STRIP: NEGATIVE
NON-SQ EPI CELLS URNS QL MICRO: ABNORMAL /HPF
NRBC BLD AUTO-RTO: 0 /100 WBCS
PH UR STRIP.AUTO: 8.5 [PH] (ref 4.5–8)
PLATELET # BLD AUTO: 196 THOUSANDS/UL (ref 149–390)
PMV BLD AUTO: 11.1 FL (ref 8.9–12.7)
POTASSIUM SERPL-SCNC: 4.6 MMOL/L (ref 3.5–5.3)
PROCALCITONIN SERPL-MCNC: 0.18 NG/ML
PROT SERPL-MCNC: 7.3 G/DL (ref 6.4–8.2)
PROT UR STRIP-MCNC: ABNORMAL MG/DL
PROTHROMBIN TIME: 26.6 SECONDS (ref 11.8–14.2)
RBC # BLD AUTO: 3.07 MILLION/UL (ref 3.81–5.12)
RBC #/AREA URNS AUTO: ABNORMAL /HPF
SODIUM SERPL-SCNC: 136 MMOL/L (ref 136–145)
SP GR UR STRIP.AUTO: 1.02 (ref 1–1.03)
TROPONIN I SERPL-MCNC: <0.02 NG/ML
UROBILINOGEN UR QL STRIP.AUTO: 0.2 E.U./DL
WBC # BLD AUTO: 7.24 THOUSAND/UL (ref 4.31–10.16)
WBC #/AREA URNS AUTO: ABNORMAL /HPF

## 2019-04-26 PROCEDURE — 80053 COMPREHEN METABOLIC PANEL: CPT | Performed by: EMERGENCY MEDICINE

## 2019-04-26 PROCEDURE — 99285 EMERGENCY DEPT VISIT HI MDM: CPT

## 2019-04-26 PROCEDURE — 84484 ASSAY OF TROPONIN QUANT: CPT | Performed by: EMERGENCY MEDICINE

## 2019-04-26 PROCEDURE — 84145 PROCALCITONIN (PCT): CPT | Performed by: EMERGENCY MEDICINE

## 2019-04-26 PROCEDURE — 71046 X-RAY EXAM CHEST 2 VIEWS: CPT

## 2019-04-26 PROCEDURE — 85730 THROMBOPLASTIN TIME PARTIAL: CPT | Performed by: EMERGENCY MEDICINE

## 2019-04-26 PROCEDURE — 93005 ELECTROCARDIOGRAM TRACING: CPT

## 2019-04-26 PROCEDURE — 74176 CT ABD & PELVIS W/O CONTRAST: CPT

## 2019-04-26 PROCEDURE — 83605 ASSAY OF LACTIC ACID: CPT | Performed by: EMERGENCY MEDICINE

## 2019-04-26 PROCEDURE — 87040 BLOOD CULTURE FOR BACTERIA: CPT | Performed by: EMERGENCY MEDICINE

## 2019-04-26 PROCEDURE — 81001 URINALYSIS AUTO W/SCOPE: CPT

## 2019-04-26 PROCEDURE — 36415 COLL VENOUS BLD VENIPUNCTURE: CPT | Performed by: EMERGENCY MEDICINE

## 2019-04-26 PROCEDURE — 99285 EMERGENCY DEPT VISIT HI MDM: CPT | Performed by: EMERGENCY MEDICINE

## 2019-04-26 PROCEDURE — 85610 PROTHROMBIN TIME: CPT | Performed by: EMERGENCY MEDICINE

## 2019-04-26 PROCEDURE — 85025 COMPLETE CBC W/AUTO DIFF WBC: CPT | Performed by: EMERGENCY MEDICINE

## 2019-04-26 RX ORDER — MELATONIN
2000 DAILY
Status: DISCONTINUED | OUTPATIENT
Start: 2019-04-27 | End: 2019-04-28 | Stop reason: HOSPADM

## 2019-04-26 RX ORDER — CALCIUM CARBONATE 1250 MG/5ML
625 SUSPENSION ORAL 2 TIMES DAILY WITH MEALS
Status: DISCONTINUED | OUTPATIENT
Start: 2019-04-27 | End: 2019-04-28 | Stop reason: HOSPADM

## 2019-04-26 RX ORDER — ATORVASTATIN CALCIUM 20 MG/1
20 TABLET, FILM COATED ORAL
Status: DISCONTINUED | OUTPATIENT
Start: 2019-04-27 | End: 2019-04-28 | Stop reason: HOSPADM

## 2019-04-26 RX ORDER — PREDNISOLONE ACETATE 10 MG/ML
1 SUSPENSION/ DROPS OPHTHALMIC 4 TIMES DAILY
Status: DISCONTINUED | OUTPATIENT
Start: 2019-04-26 | End: 2019-04-28 | Stop reason: HOSPADM

## 2019-04-26 RX ORDER — CYCLOBENZAPRINE HCL 10 MG
10 TABLET ORAL
Status: DISCONTINUED | OUTPATIENT
Start: 2019-04-26 | End: 2019-04-28 | Stop reason: HOSPADM

## 2019-04-26 RX ORDER — ONDANSETRON 2 MG/ML
4 INJECTION INTRAMUSCULAR; INTRAVENOUS EVERY 6 HOURS PRN
Status: DISCONTINUED | OUTPATIENT
Start: 2019-04-26 | End: 2019-04-28 | Stop reason: HOSPADM

## 2019-04-26 RX ORDER — DOCUSATE SODIUM 100 MG/1
100 CAPSULE, LIQUID FILLED ORAL 2 TIMES DAILY PRN
Status: DISCONTINUED | OUTPATIENT
Start: 2019-04-26 | End: 2019-04-28 | Stop reason: HOSPADM

## 2019-04-26 RX ORDER — BRIMONIDINE TARTRATE 0.15 %
1 DROPS OPHTHALMIC (EYE) 3 TIMES DAILY
Status: DISCONTINUED | OUTPATIENT
Start: 2019-04-26 | End: 2019-04-28 | Stop reason: HOSPADM

## 2019-04-26 RX ORDER — CHOLECALCIFEROL (VITAMIN D3) 10 MCG
1 TABLET ORAL
Status: DISCONTINUED | OUTPATIENT
Start: 2019-04-27 | End: 2019-04-28 | Stop reason: HOSPADM

## 2019-04-26 RX ORDER — GLIMEPIRIDE 2 MG/1
1 TABLET ORAL 2 TIMES DAILY
Status: DISCONTINUED | OUTPATIENT
Start: 2019-04-27 | End: 2019-04-27

## 2019-04-26 RX ORDER — LOSARTAN POTASSIUM 25 MG/1
25 TABLET ORAL DAILY
Status: DISCONTINUED | OUTPATIENT
Start: 2019-04-27 | End: 2019-04-28 | Stop reason: HOSPADM

## 2019-04-26 RX ORDER — ALPRAZOLAM 0.25 MG/1
0.25 TABLET ORAL EVERY 4 HOURS PRN
Status: DISCONTINUED | OUTPATIENT
Start: 2019-04-26 | End: 2019-04-27

## 2019-04-26 RX ORDER — SODIUM POLYSTYRENE SULFONATE 15 G/60ML
15 SUSPENSION ORAL; RECTAL ONCE
Status: DISCONTINUED | OUTPATIENT
Start: 2019-04-26 | End: 2019-04-27

## 2019-04-26 RX ORDER — ATROPINE SULFATE 10 MG/ML
1 SOLUTION/ DROPS OPHTHALMIC 3 TIMES DAILY
Status: DISCONTINUED | OUTPATIENT
Start: 2019-04-26 | End: 2019-04-27

## 2019-04-26 RX ORDER — NITROGLYCERIN 0.4 MG/1
0.4 TABLET SUBLINGUAL ONCE
Status: COMPLETED | OUTPATIENT
Start: 2019-04-26 | End: 2019-04-26

## 2019-04-26 RX ORDER — INSULIN GLARGINE 100 [IU]/ML
28 INJECTION, SOLUTION SUBCUTANEOUS
Status: DISCONTINUED | OUTPATIENT
Start: 2019-04-26 | End: 2019-04-27

## 2019-04-26 RX ORDER — ASPIRIN 81 MG/1
324 TABLET, CHEWABLE ORAL ONCE
Status: COMPLETED | OUTPATIENT
Start: 2019-04-26 | End: 2019-04-26

## 2019-04-26 RX ORDER — DORZOLAMIDE HYDROCHLORIDE AND TIMOLOL MALEATE 20; 5 MG/ML; MG/ML
1 SOLUTION/ DROPS OPHTHALMIC 2 TIMES DAILY
Status: DISCONTINUED | OUTPATIENT
Start: 2019-04-27 | End: 2019-04-28 | Stop reason: HOSPADM

## 2019-04-26 RX ORDER — LANOLIN ALCOHOL/MO/W.PET/CERES
3 CREAM (GRAM) TOPICAL
Status: DISCONTINUED | OUTPATIENT
Start: 2019-04-26 | End: 2019-04-28 | Stop reason: HOSPADM

## 2019-04-26 RX ORDER — LATANOPROST 50 UG/ML
1 SOLUTION/ DROPS OPHTHALMIC
Status: DISCONTINUED | OUTPATIENT
Start: 2019-04-26 | End: 2019-04-28 | Stop reason: HOSPADM

## 2019-04-26 RX ORDER — CLONIDINE HYDROCHLORIDE 0.1 MG/1
0.1 TABLET ORAL 2 TIMES DAILY
Status: DISCONTINUED | OUTPATIENT
Start: 2019-04-27 | End: 2019-04-28 | Stop reason: HOSPADM

## 2019-04-26 RX ORDER — PANTOPRAZOLE SODIUM 20 MG/1
20 TABLET, DELAYED RELEASE ORAL
Status: DISCONTINUED | OUTPATIENT
Start: 2019-04-27 | End: 2019-04-28 | Stop reason: HOSPADM

## 2019-04-26 RX ORDER — LEVOTHYROXINE SODIUM 0.05 MG/1
50 TABLET ORAL
Status: DISCONTINUED | OUTPATIENT
Start: 2019-04-27 | End: 2019-04-28 | Stop reason: HOSPADM

## 2019-04-26 RX ORDER — GABAPENTIN 100 MG/1
100 CAPSULE ORAL 3 TIMES DAILY
Status: DISCONTINUED | OUTPATIENT
Start: 2019-04-26 | End: 2019-04-27

## 2019-04-26 RX ORDER — TORSEMIDE 20 MG/1
100 TABLET ORAL DAILY
Status: DISCONTINUED | OUTPATIENT
Start: 2019-04-27 | End: 2019-04-27

## 2019-04-26 RX ORDER — CARVEDILOL 25 MG/1
25 TABLET ORAL 2 TIMES DAILY WITH MEALS
Status: DISCONTINUED | OUTPATIENT
Start: 2019-04-27 | End: 2019-04-28 | Stop reason: HOSPADM

## 2019-04-26 RX ORDER — ATROPINE SULFATE 10 MG/ML
1 SOLUTION/ DROPS OPHTHALMIC 3 TIMES DAILY
Status: DISCONTINUED | OUTPATIENT
Start: 2019-04-26 | End: 2019-04-26 | Stop reason: SDUPTHER

## 2019-04-26 RX ORDER — ACETAZOLAMIDE 125 MG/1
125 TABLET ORAL 3 TIMES DAILY
Status: DISCONTINUED | OUTPATIENT
Start: 2019-04-26 | End: 2019-04-27

## 2019-04-26 RX ORDER — CINACALCET 30 MG/1
30 TABLET, FILM COATED ORAL DAILY
Status: DISCONTINUED | OUTPATIENT
Start: 2019-04-27 | End: 2019-04-28 | Stop reason: HOSPADM

## 2019-04-26 RX ADMIN — NITROGLYCERIN 0.4 MG: 0.4 TABLET SUBLINGUAL at 22:31

## 2019-04-26 RX ADMIN — ASPIRIN 81 MG 324 MG: 81 TABLET ORAL at 22:31

## 2019-04-27 ENCOUNTER — APPOINTMENT (OUTPATIENT)
Dept: DIALYSIS | Facility: HOSPITAL | Age: 54
End: 2019-04-27
Payer: MEDICARE

## 2019-04-27 PROBLEM — N25.81 SECONDARY HYPERPARATHYROIDISM OF RENAL ORIGIN (HCC): Chronic | Status: ACTIVE | Noted: 2019-04-27

## 2019-04-27 LAB
ANION GAP SERPL CALCULATED.3IONS-SCNC: 8 MMOL/L (ref 4–13)
BASOPHILS # BLD AUTO: 0.02 THOUSANDS/ΜL (ref 0–0.1)
BASOPHILS NFR BLD AUTO: 0 % (ref 0–1)
BUN SERPL-MCNC: 59 MG/DL (ref 5–25)
CALCIUM SERPL-MCNC: 8 MG/DL (ref 8.3–10.1)
CHLORIDE SERPL-SCNC: 99 MMOL/L (ref 100–108)
CHOLEST SERPL-MCNC: 115 MG/DL (ref 50–200)
CO2 SERPL-SCNC: 29 MMOL/L (ref 21–32)
CREAT SERPL-MCNC: 8.45 MG/DL (ref 0.6–1.3)
EOSINOPHIL # BLD AUTO: 0.12 THOUSAND/ΜL (ref 0–0.61)
EOSINOPHIL NFR BLD AUTO: 2 % (ref 0–6)
ERYTHROCYTE [DISTWIDTH] IN BLOOD BY AUTOMATED COUNT: 12.4 % (ref 11.6–15.1)
GFR SERPL CREATININE-BSD FRML MDRD: 5 ML/MIN/1.73SQ M
GLUCOSE SERPL-MCNC: 120 MG/DL (ref 65–140)
GLUCOSE SERPL-MCNC: 133 MG/DL (ref 65–140)
GLUCOSE SERPL-MCNC: 143 MG/DL (ref 65–140)
GLUCOSE SERPL-MCNC: 162 MG/DL (ref 65–140)
GLUCOSE SERPL-MCNC: 177 MG/DL (ref 65–140)
GLUCOSE SERPL-MCNC: 179 MG/DL (ref 65–140)
GLUCOSE SERPL-MCNC: 186 MG/DL (ref 65–140)
GLUCOSE SERPL-MCNC: 214 MG/DL (ref 65–140)
GLUCOSE SERPL-MCNC: 214 MG/DL (ref 65–140)
GLUCOSE SERPL-MCNC: 26 MG/DL (ref 65–140)
GLUCOSE SERPL-MCNC: 275 MG/DL (ref 65–140)
GLUCOSE SERPL-MCNC: 285 MG/DL (ref 65–140)
GLUCOSE SERPL-MCNC: 294 MG/DL (ref 65–140)
GLUCOSE SERPL-MCNC: 30 MG/DL (ref 65–140)
GLUCOSE SERPL-MCNC: 303 MG/DL (ref 65–140)
HCT VFR BLD AUTO: 26.1 % (ref 34.8–46.1)
HDLC SERPL-MCNC: 32 MG/DL (ref 40–60)
HGB BLD-MCNC: 8.4 G/DL (ref 11.5–15.4)
IMM GRANULOCYTES # BLD AUTO: 0.02 THOUSAND/UL (ref 0–0.2)
IMM GRANULOCYTES NFR BLD AUTO: 0 % (ref 0–2)
LACTATE SERPL-SCNC: 1.4 MMOL/L (ref 0.5–2)
LDLC SERPL CALC-MCNC: 55 MG/DL (ref 0–100)
LYMPHOCYTES # BLD AUTO: 1.45 THOUSANDS/ΜL (ref 0.6–4.47)
LYMPHOCYTES NFR BLD AUTO: 23 % (ref 14–44)
MAGNESIUM SERPL-MCNC: 2.3 MG/DL (ref 1.6–2.6)
MCH RBC QN AUTO: 30.4 PG (ref 26.8–34.3)
MCHC RBC AUTO-ENTMCNC: 32.2 G/DL (ref 31.4–37.4)
MCV RBC AUTO: 95 FL (ref 82–98)
MONOCYTES # BLD AUTO: 0.59 THOUSAND/ΜL (ref 0.17–1.22)
MONOCYTES NFR BLD AUTO: 9 % (ref 4–12)
NEUTROPHILS # BLD AUTO: 4.22 THOUSANDS/ΜL (ref 1.85–7.62)
NEUTS SEG NFR BLD AUTO: 66 % (ref 43–75)
NONHDLC SERPL-MCNC: 83 MG/DL
NRBC BLD AUTO-RTO: 0 /100 WBCS
PHOSPHATE SERPL-MCNC: 5.3 MG/DL (ref 2.7–4.5)
PLATELET # BLD AUTO: 167 THOUSANDS/UL (ref 149–390)
PMV BLD AUTO: 11.4 FL (ref 8.9–12.7)
POTASSIUM SERPL-SCNC: 4 MMOL/L (ref 3.5–5.3)
RBC # BLD AUTO: 2.76 MILLION/UL (ref 3.81–5.12)
SODIUM SERPL-SCNC: 136 MMOL/L (ref 136–145)
TRIGL SERPL-MCNC: 141 MG/DL
TROPONIN I SERPL-MCNC: <0.02 NG/ML
TROPONIN I SERPL-MCNC: <0.02 NG/ML
WBC # BLD AUTO: 6.42 THOUSAND/UL (ref 4.31–10.16)

## 2019-04-27 PROCEDURE — 99220 PR INITIAL OBSERVATION CARE/DAY 70 MINUTES: CPT | Performed by: INTERNAL MEDICINE

## 2019-04-27 PROCEDURE — 83735 ASSAY OF MAGNESIUM: CPT | Performed by: INTERNAL MEDICINE

## 2019-04-27 PROCEDURE — 80048 BASIC METABOLIC PNL TOTAL CA: CPT | Performed by: INTERNAL MEDICINE

## 2019-04-27 PROCEDURE — 84484 ASSAY OF TROPONIN QUANT: CPT | Performed by: INTERNAL MEDICINE

## 2019-04-27 PROCEDURE — 93005 ELECTROCARDIOGRAM TRACING: CPT

## 2019-04-27 PROCEDURE — 82948 REAGENT STRIP/BLOOD GLUCOSE: CPT

## 2019-04-27 PROCEDURE — 83605 ASSAY OF LACTIC ACID: CPT | Performed by: INTERNAL MEDICINE

## 2019-04-27 PROCEDURE — 84100 ASSAY OF PHOSPHORUS: CPT | Performed by: INTERNAL MEDICINE

## 2019-04-27 PROCEDURE — G0257 UNSCHED DIALYSIS ESRD PT HOS: HCPCS

## 2019-04-27 PROCEDURE — 80061 LIPID PANEL: CPT | Performed by: INTERNAL MEDICINE

## 2019-04-27 PROCEDURE — 99214 OFFICE O/P EST MOD 30 MIN: CPT | Performed by: INTERNAL MEDICINE

## 2019-04-27 PROCEDURE — 85025 COMPLETE CBC W/AUTO DIFF WBC: CPT | Performed by: INTERNAL MEDICINE

## 2019-04-27 PROCEDURE — 90935 HEMODIALYSIS ONE EVALUATION: CPT | Performed by: INTERNAL MEDICINE

## 2019-04-27 PROCEDURE — 99225 PR SBSQ OBSERVATION CARE/DAY 25 MINUTES: CPT | Performed by: NURSE PRACTITIONER

## 2019-04-27 RX ORDER — DEXTROSE MONOHYDRATE 25 G/50ML
INJECTION, SOLUTION INTRAVENOUS
Status: COMPLETED
Start: 2019-04-27 | End: 2019-04-27

## 2019-04-27 RX ORDER — GABAPENTIN 100 MG/1
100 CAPSULE ORAL DAILY
Status: DISCONTINUED | OUTPATIENT
Start: 2019-04-27 | End: 2019-04-28 | Stop reason: HOSPADM

## 2019-04-27 RX ORDER — ALPRAZOLAM 0.25 MG/1
0.25 TABLET ORAL 2 TIMES DAILY PRN
Status: DISCONTINUED | OUTPATIENT
Start: 2019-04-27 | End: 2019-04-28 | Stop reason: HOSPADM

## 2019-04-27 RX ORDER — INSULIN GLARGINE 100 [IU]/ML
28 INJECTION, SOLUTION SUBCUTANEOUS
Status: ON HOLD | COMMUNITY
End: 2019-08-31 | Stop reason: SDUPTHER

## 2019-04-27 RX ORDER — TORSEMIDE 20 MG/1
200 TABLET ORAL EVERY 12 HOURS
Status: DISCONTINUED | OUTPATIENT
Start: 2019-04-27 | End: 2019-04-28 | Stop reason: HOSPADM

## 2019-04-27 RX ORDER — ACETAMINOPHEN 325 MG/1
650 TABLET ORAL EVERY 6 HOURS PRN
Status: DISCONTINUED | OUTPATIENT
Start: 2019-04-27 | End: 2019-04-28 | Stop reason: HOSPADM

## 2019-04-27 RX ORDER — DEXTROSE MONOHYDRATE 25 G/50ML
25 INJECTION, SOLUTION INTRAVENOUS ONCE
Status: COMPLETED | OUTPATIENT
Start: 2019-04-27 | End: 2019-04-27

## 2019-04-27 RX ORDER — DIPHENHYDRAMINE HCL 25 MG
12.5 TABLET ORAL DAILY PRN
Status: DISCONTINUED | OUTPATIENT
Start: 2019-04-27 | End: 2019-04-28 | Stop reason: HOSPADM

## 2019-04-27 RX ORDER — INSULIN GLARGINE 100 [IU]/ML
20 INJECTION, SOLUTION SUBCUTANEOUS
Status: DISCONTINUED | OUTPATIENT
Start: 2019-04-27 | End: 2019-04-28 | Stop reason: HOSPADM

## 2019-04-27 RX ORDER — WARFARIN SODIUM 7.5 MG/1
15 TABLET ORAL
COMMUNITY
End: 2019-10-11

## 2019-04-27 RX ORDER — WARFARIN SODIUM 2.5 MG/1
12.5 TABLET ORAL
COMMUNITY
End: 2019-10-11

## 2019-04-27 RX ORDER — ALPRAZOLAM 0.25 MG/1
0.12 TABLET ORAL EVERY 4 HOURS PRN
Status: DISCONTINUED | OUTPATIENT
Start: 2019-04-27 | End: 2019-04-27

## 2019-04-27 RX ORDER — DEXTROSE MONOHYDRATE 25 G/50ML
INJECTION, SOLUTION INTRAVENOUS
Status: DISPENSED
Start: 2019-04-27 | End: 2019-04-27

## 2019-04-27 RX ORDER — ATROPINE SULFATE 10 MG/ML
1 SOLUTION/ DROPS OPHTHALMIC 3 TIMES DAILY
Status: DISCONTINUED | OUTPATIENT
Start: 2019-04-27 | End: 2019-04-28 | Stop reason: HOSPADM

## 2019-04-27 RX ADMIN — BRIMONIDINE TARTRATE 1 DROP: 1.5 SOLUTION OPHTHALMIC at 00:56

## 2019-04-27 RX ADMIN — PANTOPRAZOLE SODIUM 20 MG: 20 TABLET, DELAYED RELEASE ORAL at 06:02

## 2019-04-27 RX ADMIN — MELATONIN 3 MG: at 22:32

## 2019-04-27 RX ADMIN — MELATONIN 3 MG: at 00:58

## 2019-04-27 RX ADMIN — BRIMONIDINE TARTRATE 1 DROP: 1.5 SOLUTION OPHTHALMIC at 21:18

## 2019-04-27 RX ADMIN — DORZOLAMIDE HYDROCHLORIDE AND TIMOLOL MALEATE 1 DROP: 20; 5 SOLUTION/ DROPS OPHTHALMIC at 17:11

## 2019-04-27 RX ADMIN — DICLOFENAC 4 G: 10 GEL TOPICAL at 09:39

## 2019-04-27 RX ADMIN — CYCLOBENZAPRINE HYDROCHLORIDE 10 MG: 10 TABLET, FILM COATED ORAL at 22:32

## 2019-04-27 RX ADMIN — INSULIN GLARGINE 20 UNITS: 100 INJECTION, SOLUTION SUBCUTANEOUS at 22:32

## 2019-04-27 RX ADMIN — BRIMONIDINE TARTRATE 1 DROP: 1.5 SOLUTION OPHTHALMIC at 17:09

## 2019-04-27 RX ADMIN — ATROPINE SULFATE 1 DROP: 10 SOLUTION/ DROPS OPHTHALMIC at 17:08

## 2019-04-27 RX ADMIN — PREDNISOLONE ACETATE 1 DROP: 10 SUSPENSION/ DROPS OPHTHALMIC at 17:11

## 2019-04-27 RX ADMIN — CINACALCET HYDROCHLORIDE 30 MG: 30 TABLET, COATED ORAL at 09:39

## 2019-04-27 RX ADMIN — DEXTROSE 250 ML: 5 SOLUTION INTRAVENOUS at 09:09

## 2019-04-27 RX ADMIN — CALCIUM CARBONATE 625 MG: 1250 SUSPENSION ORAL at 09:37

## 2019-04-27 RX ADMIN — LATANOPROST 1 DROP: 50 SOLUTION OPHTHALMIC at 22:33

## 2019-04-27 RX ADMIN — ATORVASTATIN CALCIUM 20 MG: 20 TABLET, FILM COATED ORAL at 17:06

## 2019-04-27 RX ADMIN — BRIMONIDINE TARTRATE 1 DROP: 1.5 SOLUTION OPHTHALMIC at 09:40

## 2019-04-27 RX ADMIN — ALPRAZOLAM 0.25 MG: 0.25 TABLET ORAL at 13:01

## 2019-04-27 RX ADMIN — DORZOLAMIDE HYDROCHLORIDE AND TIMOLOL MALEATE 1 DROP: 20; 5 SOLUTION/ DROPS OPHTHALMIC at 09:43

## 2019-04-27 RX ADMIN — DIPHENHYDRAMINE HCL 12.5 MG: 25 TABLET ORAL at 13:00

## 2019-04-27 RX ADMIN — ATROPINE SULFATE 1 DROP: 10 SOLUTION/ DROPS OPHTHALMIC at 21:18

## 2019-04-27 RX ADMIN — CARVEDILOL 25 MG: 25 TABLET, FILM COATED ORAL at 17:06

## 2019-04-27 RX ADMIN — PREDNISOLONE ACETATE 1 DROP: 10 SUSPENSION/ DROPS OPHTHALMIC at 22:33

## 2019-04-27 RX ADMIN — INSULIN GLARGINE 28 UNITS: 100 INJECTION, SOLUTION SUBCUTANEOUS at 00:53

## 2019-04-27 RX ADMIN — PREDNISOLONE ACETATE 1 DROP: 10 SUSPENSION/ DROPS OPHTHALMIC at 09:44

## 2019-04-27 RX ADMIN — ALPRAZOLAM 0.12 MG: 0.25 TABLET ORAL at 00:54

## 2019-04-27 RX ADMIN — GABAPENTIN 100 MG: 100 CAPSULE ORAL at 09:39

## 2019-04-27 RX ADMIN — INSULIN LISPRO 1 UNITS: 100 INJECTION, SOLUTION INTRAVENOUS; SUBCUTANEOUS at 00:57

## 2019-04-27 RX ADMIN — DICLOFENAC 4 G: 10 GEL TOPICAL at 22:43

## 2019-04-27 RX ADMIN — DICLOFENAC 4 G: 10 GEL TOPICAL at 12:00

## 2019-04-27 RX ADMIN — Medication 1 CAPSULE: at 17:06

## 2019-04-27 RX ADMIN — INSULIN LISPRO 6 UNITS: 100 INJECTION, SOLUTION INTRAVENOUS; SUBCUTANEOUS at 06:02

## 2019-04-27 RX ADMIN — LEVOTHYROXINE SODIUM 50 MCG: 50 TABLET ORAL at 06:02

## 2019-04-27 RX ADMIN — SERTRALINE HYDROCHLORIDE 50 MG: 50 TABLET ORAL at 09:39

## 2019-04-27 RX ADMIN — PREDNISOLONE ACETATE 1 DROP: 10 SUSPENSION/ DROPS OPHTHALMIC at 12:00

## 2019-04-27 RX ADMIN — CALCIUM CARBONATE 625 MG: 1250 SUSPENSION ORAL at 17:06

## 2019-04-27 RX ADMIN — PREDNISOLONE ACETATE 1 DROP: 10 SUSPENSION/ DROPS OPHTHALMIC at 00:56

## 2019-04-27 RX ADMIN — TORSEMIDE 200 MG: 20 TABLET ORAL at 21:18

## 2019-04-27 RX ADMIN — CLONIDINE HYDROCHLORIDE 0.1 MG: 0.1 TABLET ORAL at 17:06

## 2019-04-27 RX ADMIN — WARFARIN SODIUM 9 MG: 7.5 TABLET ORAL at 17:06

## 2019-04-27 RX ADMIN — VITAMIN D, TAB 1000IU (100/BT) 2000 UNITS: 25 TAB at 09:39

## 2019-04-27 RX ADMIN — DIPHENHYDRAMINE HCL 12.5 MG: 25 TABLET ORAL at 00:55

## 2019-04-27 RX ADMIN — DEXTROSE MONOHYDRATE 25 ML: 25 INJECTION, SOLUTION INTRAVENOUS at 09:31

## 2019-04-27 RX ADMIN — LATANOPROST 1 DROP: 50 SOLUTION OPHTHALMIC at 00:56

## 2019-04-27 RX ADMIN — DICLOFENAC 4 G: 10 GEL TOPICAL at 17:04

## 2019-04-27 RX ADMIN — CYCLOBENZAPRINE HYDROCHLORIDE 10 MG: 10 TABLET, FILM COATED ORAL at 01:00

## 2019-04-27 RX ADMIN — DICLOFENAC 4 G: 10 GEL TOPICAL at 00:53

## 2019-04-27 RX ADMIN — ATROPINE SULFATE 1 DROP: 10 SOLUTION/ DROPS OPHTHALMIC at 09:36

## 2019-04-28 VITALS
TEMPERATURE: 98.7 F | RESPIRATION RATE: 18 BRPM | BODY MASS INDEX: 41.17 KG/M2 | HEART RATE: 69 BPM | DIASTOLIC BLOOD PRESSURE: 54 MMHG | WEIGHT: 256.2 LBS | HEIGHT: 66 IN | SYSTOLIC BLOOD PRESSURE: 113 MMHG | OXYGEN SATURATION: 97 %

## 2019-04-28 PROBLEM — R11.0 NAUSEA: Status: RESOLVED | Noted: 2019-04-26 | Resolved: 2019-04-28

## 2019-04-28 LAB
ANION GAP SERPL CALCULATED.3IONS-SCNC: 4 MMOL/L (ref 4–13)
ATRIAL RATE: 68 BPM
ATRIAL RATE: 75 BPM
BUN SERPL-MCNC: 46 MG/DL (ref 5–25)
CALCIUM SERPL-MCNC: 8 MG/DL (ref 8.3–10.1)
CHLORIDE SERPL-SCNC: 97 MMOL/L (ref 100–108)
CO2 SERPL-SCNC: 30 MMOL/L (ref 21–32)
CREAT SERPL-MCNC: 6.34 MG/DL (ref 0.6–1.3)
EST. AVERAGE GLUCOSE BLD GHB EST-MCNC: 209 MG/DL
GFR SERPL CREATININE-BSD FRML MDRD: 7 ML/MIN/1.73SQ M
GLUCOSE SERPL-MCNC: 142 MG/DL (ref 65–140)
GLUCOSE SERPL-MCNC: 143 MG/DL (ref 65–140)
GLUCOSE SERPL-MCNC: 163 MG/DL (ref 65–140)
GLUCOSE SERPL-MCNC: 204 MG/DL (ref 65–140)
GLUCOSE SERPL-MCNC: 224 MG/DL (ref 65–140)
GLUCOSE SERPL-MCNC: 339 MG/DL (ref 65–140)
HBA1C MFR BLD: 8.9 % (ref 4.2–6.3)
P AXIS: 22 DEGREES
P AXIS: 33 DEGREES
POTASSIUM SERPL-SCNC: 4.2 MMOL/L (ref 3.5–5.3)
PR INTERVAL: 168 MS
PR INTERVAL: 190 MS
QRS AXIS: 25 DEGREES
QRS AXIS: 29 DEGREES
QRSD INTERVAL: 102 MS
QRSD INTERVAL: 94 MS
QT INTERVAL: 374 MS
QT INTERVAL: 426 MS
QTC INTERVAL: 417 MS
QTC INTERVAL: 452 MS
SODIUM SERPL-SCNC: 131 MMOL/L (ref 136–145)
T WAVE AXIS: 58 DEGREES
T WAVE AXIS: 63 DEGREES
VENTRICULAR RATE: 68 BPM
VENTRICULAR RATE: 75 BPM

## 2019-04-28 PROCEDURE — 99217 PR OBSERVATION CARE DISCHARGE MANAGEMENT: CPT | Performed by: NURSE PRACTITIONER

## 2019-04-28 PROCEDURE — 93010 ELECTROCARDIOGRAM REPORT: CPT | Performed by: INTERNAL MEDICINE

## 2019-04-28 PROCEDURE — 82948 REAGENT STRIP/BLOOD GLUCOSE: CPT

## 2019-04-28 PROCEDURE — 83036 HEMOGLOBIN GLYCOSYLATED A1C: CPT | Performed by: NURSE PRACTITIONER

## 2019-04-28 PROCEDURE — 80048 BASIC METABOLIC PNL TOTAL CA: CPT | Performed by: INTERNAL MEDICINE

## 2019-04-28 RX ORDER — ALPRAZOLAM 0.25 MG/1
0.25 TABLET ORAL 2 TIMES DAILY PRN
COMMUNITY

## 2019-04-28 RX ORDER — B COMPLEX, C NO.20/FOLIC ACID 1 MG
1 CAPSULE ORAL DAILY
Qty: 30 CAPSULE | Refills: 0 | Status: SHIPPED | OUTPATIENT
Start: 2019-04-28 | End: 2021-06-11 | Stop reason: HOSPADM

## 2019-04-28 RX ADMIN — CALCIUM CARBONATE 625 MG: 1250 SUSPENSION ORAL at 08:12

## 2019-04-28 RX ADMIN — DICLOFENAC 4 G: 10 GEL TOPICAL at 08:12

## 2019-04-28 RX ADMIN — SERTRALINE HYDROCHLORIDE 50 MG: 50 TABLET ORAL at 08:12

## 2019-04-28 RX ADMIN — ALPRAZOLAM 0.25 MG: 0.25 TABLET ORAL at 00:00

## 2019-04-28 RX ADMIN — ATROPINE SULFATE 1 DROP: 10 SOLUTION/ DROPS OPHTHALMIC at 08:17

## 2019-04-28 RX ADMIN — CINACALCET HYDROCHLORIDE 30 MG: 30 TABLET, COATED ORAL at 08:11

## 2019-04-28 RX ADMIN — CARVEDILOL 25 MG: 25 TABLET, FILM COATED ORAL at 08:12

## 2019-04-28 RX ADMIN — BRIMONIDINE TARTRATE 1 DROP: 1.5 SOLUTION OPHTHALMIC at 08:18

## 2019-04-28 RX ADMIN — CLONIDINE HYDROCHLORIDE 0.1 MG: 0.1 TABLET ORAL at 08:12

## 2019-04-28 RX ADMIN — DORZOLAMIDE HYDROCHLORIDE AND TIMOLOL MALEATE 1 DROP: 20; 5 SOLUTION/ DROPS OPHTHALMIC at 08:18

## 2019-04-28 RX ADMIN — PANTOPRAZOLE SODIUM 20 MG: 20 TABLET, DELAYED RELEASE ORAL at 05:32

## 2019-04-28 RX ADMIN — LOSARTAN POTASSIUM 25 MG: 25 TABLET, FILM COATED ORAL at 08:11

## 2019-04-28 RX ADMIN — GABAPENTIN 100 MG: 100 CAPSULE ORAL at 08:12

## 2019-04-28 RX ADMIN — PREDNISOLONE ACETATE 1 DROP: 10 SUSPENSION/ DROPS OPHTHALMIC at 08:18

## 2019-04-28 RX ADMIN — LEVOTHYROXINE SODIUM 50 MCG: 50 TABLET ORAL at 05:31

## 2019-04-28 RX ADMIN — TORSEMIDE 200 MG: 20 TABLET ORAL at 08:11

## 2019-04-28 RX ADMIN — VITAMIN D, TAB 1000IU (100/BT) 2000 UNITS: 25 TAB at 08:11

## 2019-05-01 ENCOUNTER — OFFICE VISIT (OUTPATIENT)
Dept: GASTROENTEROLOGY | Facility: CLINIC | Age: 54
End: 2019-05-01
Payer: MEDICARE

## 2019-05-01 VITALS
TEMPERATURE: 96.5 F | DIASTOLIC BLOOD PRESSURE: 64 MMHG | BODY MASS INDEX: 41.62 KG/M2 | HEART RATE: 71 BPM | SYSTOLIC BLOOD PRESSURE: 137 MMHG | WEIGHT: 259 LBS | HEIGHT: 66 IN

## 2019-05-01 DIAGNOSIS — R11.2 NON-INTRACTABLE VOMITING WITH NAUSEA, UNSPECIFIED VOMITING TYPE: Primary | ICD-10-CM

## 2019-05-01 DIAGNOSIS — E66.01 CLASS 3 SEVERE OBESITY WITH BODY MASS INDEX (BMI) OF 40.0 TO 44.9 IN ADULT, UNSPECIFIED OBESITY TYPE, UNSPECIFIED WHETHER SERIOUS COMORBIDITY PRESENT (HCC): ICD-10-CM

## 2019-05-01 PROBLEM — K21.9 GERD (GASTROESOPHAGEAL REFLUX DISEASE): Status: RESOLVED | Noted: 2019-03-05 | Resolved: 2019-05-01

## 2019-05-01 LAB
BACTERIA BLD CULT: NORMAL
BACTERIA BLD CULT: NORMAL

## 2019-05-01 PROCEDURE — 99214 OFFICE O/P EST MOD 30 MIN: CPT | Performed by: INTERNAL MEDICINE

## 2019-05-01 RX ORDER — OMEPRAZOLE 40 MG/1
40 CAPSULE, DELAYED RELEASE ORAL DAILY
Qty: 30 CAPSULE | Refills: 3 | Status: ON HOLD | OUTPATIENT
Start: 2019-05-01 | End: 2019-05-19 | Stop reason: CLARIF

## 2019-05-16 ENCOUNTER — TELEPHONE (OUTPATIENT)
Dept: OBGYN CLINIC | Facility: CLINIC | Age: 54
End: 2019-05-16

## 2019-05-16 ENCOUNTER — HOSPITAL ENCOUNTER (INPATIENT)
Facility: HOSPITAL | Age: 54
LOS: 1 days | Discharge: HOME | DRG: 640 | End: 2019-05-19
Attending: EMERGENCY MEDICINE | Admitting: INTERNAL MEDICINE
Payer: MEDICARE

## 2019-05-16 ENCOUNTER — APPOINTMENT (EMERGENCY)
Dept: RADIOLOGY | Facility: HOSPITAL | Age: 54
DRG: 640 | End: 2019-05-16
Payer: MEDICARE

## 2019-05-16 DIAGNOSIS — R06.02 SHORTNESS OF BREATH: ICD-10-CM

## 2019-05-16 DIAGNOSIS — S16.1XXA STRAIN OF CERVICAL PORTION OF RIGHT TRAPEZIUS MUSCLE: ICD-10-CM

## 2019-05-16 DIAGNOSIS — N18.6 ESRD (END STAGE RENAL DISEASE) (HCC): ICD-10-CM

## 2019-05-16 DIAGNOSIS — R51.9 HEADACHE: ICD-10-CM

## 2019-05-16 DIAGNOSIS — R07.9 CHEST PAIN: ICD-10-CM

## 2019-05-16 DIAGNOSIS — R53.1 WEAKNESS: Primary | ICD-10-CM

## 2019-05-16 DIAGNOSIS — M25.561 RIGHT KNEE PAIN: ICD-10-CM

## 2019-05-16 LAB
BASOPHILS # BLD AUTO: 0.02 THOUSANDS/ΜL (ref 0–0.1)
BASOPHILS NFR BLD AUTO: 0 % (ref 0–1)
EOSINOPHIL # BLD AUTO: 0.05 THOUSAND/ΜL (ref 0–0.61)
EOSINOPHIL NFR BLD AUTO: 1 % (ref 0–6)
ERYTHROCYTE [DISTWIDTH] IN BLOOD BY AUTOMATED COUNT: 12.8 % (ref 11.6–15.1)
HCT VFR BLD AUTO: 28.1 % (ref 34.8–46.1)
HGB BLD-MCNC: 9.2 G/DL (ref 11.5–15.4)
IMM GRANULOCYTES # BLD AUTO: 0.02 THOUSAND/UL (ref 0–0.2)
IMM GRANULOCYTES NFR BLD AUTO: 0 % (ref 0–2)
LYMPHOCYTES # BLD AUTO: 0.24 THOUSANDS/ΜL (ref 0.6–4.47)
LYMPHOCYTES NFR BLD AUTO: 4 % (ref 14–44)
MCH RBC QN AUTO: 30.9 PG (ref 26.8–34.3)
MCHC RBC AUTO-ENTMCNC: 32.7 G/DL (ref 31.4–37.4)
MCV RBC AUTO: 94 FL (ref 82–98)
MONOCYTES # BLD AUTO: 0.49 THOUSAND/ΜL (ref 0.17–1.22)
MONOCYTES NFR BLD AUTO: 8 % (ref 4–12)
NEUTROPHILS # BLD AUTO: 5.51 THOUSANDS/ΜL (ref 1.85–7.62)
NEUTS SEG NFR BLD AUTO: 87 % (ref 43–75)
NRBC BLD AUTO-RTO: 0 /100 WBCS
PLATELET # BLD AUTO: 157 THOUSANDS/UL (ref 149–390)
PMV BLD AUTO: 11.2 FL (ref 8.9–12.7)
RBC # BLD AUTO: 2.98 MILLION/UL (ref 3.81–5.12)
WBC # BLD AUTO: 6.33 THOUSAND/UL (ref 4.31–10.16)

## 2019-05-16 PROCEDURE — 99285 EMERGENCY DEPT VISIT HI MDM: CPT

## 2019-05-16 PROCEDURE — 84484 ASSAY OF TROPONIN QUANT: CPT | Performed by: EMERGENCY MEDICINE

## 2019-05-16 PROCEDURE — 93005 ELECTROCARDIOGRAM TRACING: CPT

## 2019-05-16 PROCEDURE — 85025 COMPLETE CBC W/AUTO DIFF WBC: CPT | Performed by: EMERGENCY MEDICINE

## 2019-05-16 PROCEDURE — 80053 COMPREHEN METABOLIC PANEL: CPT | Performed by: EMERGENCY MEDICINE

## 2019-05-16 PROCEDURE — 85610 PROTHROMBIN TIME: CPT | Performed by: EMERGENCY MEDICINE

## 2019-05-16 PROCEDURE — 71046 X-RAY EXAM CHEST 2 VIEWS: CPT

## 2019-05-16 PROCEDURE — 36415 COLL VENOUS BLD VENIPUNCTURE: CPT

## 2019-05-16 PROCEDURE — 96374 THER/PROPH/DIAG INJ IV PUSH: CPT

## 2019-05-16 RX ORDER — ACETAMINOPHEN 325 MG/1
975 TABLET ORAL ONCE
Status: COMPLETED | OUTPATIENT
Start: 2019-05-17 | End: 2019-05-17

## 2019-05-16 RX ORDER — ONDANSETRON 2 MG/ML
4 INJECTION INTRAMUSCULAR; INTRAVENOUS ONCE
Status: COMPLETED | OUTPATIENT
Start: 2019-05-16 | End: 2019-05-16

## 2019-05-16 RX ORDER — PANTOPRAZOLE SODIUM 40 MG/1
40 TABLET, DELAYED RELEASE ORAL DAILY
Status: ON HOLD | COMMUNITY
End: 2020-02-21 | Stop reason: SDUPTHER

## 2019-05-16 RX ORDER — LORATADINE 10 MG/1
10 TABLET ORAL DAILY
COMMUNITY

## 2019-05-16 RX ADMIN — ONDANSETRON 4 MG: 2 INJECTION INTRAMUSCULAR; INTRAVENOUS at 23:42

## 2019-05-17 ENCOUNTER — APPOINTMENT (OUTPATIENT)
Dept: RADIOLOGY | Facility: HOSPITAL | Age: 54
DRG: 640 | End: 2019-05-17
Payer: MEDICARE

## 2019-05-17 PROBLEM — R79.1 SUPRATHERAPEUTIC INR: Status: ACTIVE | Noted: 2019-05-17

## 2019-05-17 PROBLEM — R07.89 OTHER CHEST PAIN: Status: ACTIVE | Noted: 2019-05-17

## 2019-05-17 LAB
ALBUMIN SERPL BCP-MCNC: 3.4 G/DL (ref 3.5–5)
ALP SERPL-CCNC: 192 U/L (ref 46–116)
ALT SERPL W P-5'-P-CCNC: 8 U/L (ref 12–78)
ANION GAP SERPL CALCULATED.3IONS-SCNC: 5 MMOL/L (ref 4–13)
ANION GAP SERPL CALCULATED.3IONS-SCNC: 5 MMOL/L (ref 4–13)
AST SERPL W P-5'-P-CCNC: 17 U/L (ref 5–45)
BILIRUB SERPL-MCNC: 0.37 MG/DL (ref 0.2–1)
BUN SERPL-MCNC: 50 MG/DL (ref 5–25)
BUN SERPL-MCNC: 56 MG/DL (ref 5–25)
CALCIUM SERPL-MCNC: 7.9 MG/DL (ref 8.3–10.1)
CALCIUM SERPL-MCNC: 8.3 MG/DL (ref 8.3–10.1)
CHLORIDE SERPL-SCNC: 100 MMOL/L (ref 100–108)
CHLORIDE SERPL-SCNC: 100 MMOL/L (ref 100–108)
CO2 SERPL-SCNC: 29 MMOL/L (ref 21–32)
CO2 SERPL-SCNC: 31 MMOL/L (ref 21–32)
CREAT SERPL-MCNC: 6.37 MG/DL (ref 0.6–1.3)
CREAT SERPL-MCNC: 6.98 MG/DL (ref 0.6–1.3)
ERYTHROCYTE [DISTWIDTH] IN BLOOD BY AUTOMATED COUNT: 12.8 % (ref 11.6–15.1)
GFR SERPL CREATININE-BSD FRML MDRD: 6 ML/MIN/1.73SQ M
GFR SERPL CREATININE-BSD FRML MDRD: 7 ML/MIN/1.73SQ M
GLUCOSE SERPL-MCNC: 127 MG/DL (ref 65–140)
GLUCOSE SERPL-MCNC: 151 MG/DL (ref 65–140)
GLUCOSE SERPL-MCNC: 166 MG/DL (ref 65–140)
GLUCOSE SERPL-MCNC: 211 MG/DL (ref 65–140)
GLUCOSE SERPL-MCNC: 215 MG/DL (ref 65–140)
GLUCOSE SERPL-MCNC: 231 MG/DL (ref 65–140)
GLUCOSE SERPL-MCNC: 282 MG/DL (ref 65–140)
GLUCOSE SERPL-MCNC: 373 MG/DL (ref 65–140)
HCT VFR BLD AUTO: 25.8 % (ref 34.8–46.1)
HGB BLD-MCNC: 8.3 G/DL (ref 11.5–15.4)
INR PPP: 4.17 (ref 0.86–1.17)
INR PPP: 4.5 (ref 0.86–1.17)
MAGNESIUM SERPL-MCNC: 2.6 MG/DL (ref 1.6–2.6)
MCH RBC QN AUTO: 30.6 PG (ref 26.8–34.3)
MCHC RBC AUTO-ENTMCNC: 32.2 G/DL (ref 31.4–37.4)
MCV RBC AUTO: 95 FL (ref 82–98)
PLATELET # BLD AUTO: 133 THOUSANDS/UL (ref 149–390)
PMV BLD AUTO: 11.3 FL (ref 8.9–12.7)
POTASSIUM SERPL-SCNC: 4.3 MMOL/L (ref 3.5–5.3)
POTASSIUM SERPL-SCNC: 5.1 MMOL/L (ref 3.5–5.3)
PROT SERPL-MCNC: 7.1 G/DL (ref 6.4–8.2)
PROTHROMBIN TIME: 40.2 SECONDS (ref 11.8–14.2)
PROTHROMBIN TIME: 42.7 SECONDS (ref 11.8–14.2)
RBC # BLD AUTO: 2.71 MILLION/UL (ref 3.81–5.12)
SODIUM SERPL-SCNC: 134 MMOL/L (ref 136–145)
SODIUM SERPL-SCNC: 136 MMOL/L (ref 136–145)
TROPONIN I SERPL-MCNC: <0.02 NG/ML
WBC # BLD AUTO: 5.12 THOUSAND/UL (ref 4.31–10.16)

## 2019-05-17 PROCEDURE — 82948 REAGENT STRIP/BLOOD GLUCOSE: CPT

## 2019-05-17 PROCEDURE — 99220 PR INITIAL OBSERVATION CARE/DAY 70 MINUTES: CPT | Performed by: INTERNAL MEDICINE

## 2019-05-17 PROCEDURE — 85610 PROTHROMBIN TIME: CPT | Performed by: INTERNAL MEDICINE

## 2019-05-17 PROCEDURE — 84484 ASSAY OF TROPONIN QUANT: CPT | Performed by: INTERNAL MEDICINE

## 2019-05-17 PROCEDURE — 99285 EMERGENCY DEPT VISIT HI MDM: CPT | Performed by: EMERGENCY MEDICINE

## 2019-05-17 PROCEDURE — 85027 COMPLETE CBC AUTOMATED: CPT | Performed by: INTERNAL MEDICINE

## 2019-05-17 PROCEDURE — 80048 BASIC METABOLIC PNL TOTAL CA: CPT | Performed by: INTERNAL MEDICINE

## 2019-05-17 PROCEDURE — 99232 SBSQ HOSP IP/OBS MODERATE 35: CPT | Performed by: INTERNAL MEDICINE

## 2019-05-17 PROCEDURE — 83735 ASSAY OF MAGNESIUM: CPT | Performed by: INTERNAL MEDICINE

## 2019-05-17 PROCEDURE — 93005 ELECTROCARDIOGRAM TRACING: CPT

## 2019-05-17 RX ORDER — CARVEDILOL 25 MG/1
25 TABLET ORAL 2 TIMES DAILY WITH MEALS
Status: DISCONTINUED | OUTPATIENT
Start: 2019-05-17 | End: 2019-05-19 | Stop reason: HOSPADM

## 2019-05-17 RX ORDER — TORSEMIDE 20 MG/1
200 TABLET ORAL
Status: DISCONTINUED | OUTPATIENT
Start: 2019-05-17 | End: 2019-05-19 | Stop reason: HOSPADM

## 2019-05-17 RX ORDER — LORATADINE 10 MG/1
10 TABLET ORAL DAILY
Status: DISCONTINUED | OUTPATIENT
Start: 2019-05-17 | End: 2019-05-19 | Stop reason: HOSPADM

## 2019-05-17 RX ORDER — CINACALCET 30 MG/1
30 TABLET, FILM COATED ORAL DAILY
Status: DISCONTINUED | OUTPATIENT
Start: 2019-05-17 | End: 2019-05-19 | Stop reason: HOSPADM

## 2019-05-17 RX ORDER — ONDANSETRON 2 MG/ML
4 INJECTION INTRAMUSCULAR; INTRAVENOUS EVERY 6 HOURS PRN
Status: DISCONTINUED | OUTPATIENT
Start: 2019-05-17 | End: 2019-05-19 | Stop reason: HOSPADM

## 2019-05-17 RX ORDER — ALPRAZOLAM 0.25 MG/1
0.25 TABLET ORAL 2 TIMES DAILY PRN
Status: DISCONTINUED | OUTPATIENT
Start: 2019-05-17 | End: 2019-05-19 | Stop reason: HOSPADM

## 2019-05-17 RX ORDER — LOPERAMIDE HYDROCHLORIDE 2 MG/1
2 CAPSULE ORAL 4 TIMES DAILY PRN
Status: DISCONTINUED | OUTPATIENT
Start: 2019-05-17 | End: 2019-05-19 | Stop reason: HOSPADM

## 2019-05-17 RX ORDER — INSULIN GLARGINE 100 [IU]/ML
28 INJECTION, SOLUTION SUBCUTANEOUS
Status: DISCONTINUED | OUTPATIENT
Start: 2019-05-17 | End: 2019-05-19 | Stop reason: HOSPADM

## 2019-05-17 RX ORDER — ATORVASTATIN CALCIUM 20 MG/1
20 TABLET, FILM COATED ORAL EVERY EVENING
Status: DISCONTINUED | OUTPATIENT
Start: 2019-05-17 | End: 2019-05-19 | Stop reason: HOSPADM

## 2019-05-17 RX ORDER — ACETAMINOPHEN 325 MG/1
650 TABLET ORAL EVERY 6 HOURS PRN
Status: DISCONTINUED | OUTPATIENT
Start: 2019-05-17 | End: 2019-05-19 | Stop reason: HOSPADM

## 2019-05-17 RX ORDER — LANOLIN ALCOHOL/MO/W.PET/CERES
6 CREAM (GRAM) TOPICAL
Status: DISCONTINUED | OUTPATIENT
Start: 2019-05-17 | End: 2019-05-19 | Stop reason: HOSPADM

## 2019-05-17 RX ORDER — MELATONIN
2000 DAILY
Status: DISCONTINUED | OUTPATIENT
Start: 2019-05-17 | End: 2019-05-19 | Stop reason: HOSPADM

## 2019-05-17 RX ORDER — CHOLECALCIFEROL (VITAMIN D3) 10 MCG
1 TABLET ORAL DAILY
Status: DISCONTINUED | OUTPATIENT
Start: 2019-05-17 | End: 2019-05-19 | Stop reason: HOSPADM

## 2019-05-17 RX ORDER — GABAPENTIN 100 MG/1
100 CAPSULE ORAL 3 TIMES DAILY
Status: DISCONTINUED | OUTPATIENT
Start: 2019-05-17 | End: 2019-05-19 | Stop reason: HOSPADM

## 2019-05-17 RX ORDER — LEVOTHYROXINE SODIUM 0.05 MG/1
50 TABLET ORAL
Status: DISCONTINUED | OUTPATIENT
Start: 2019-05-17 | End: 2019-05-19 | Stop reason: HOSPADM

## 2019-05-17 RX ORDER — METOCLOPRAMIDE HYDROCHLORIDE 5 MG/ML
10 INJECTION INTRAMUSCULAR; INTRAVENOUS ONCE
Status: COMPLETED | OUTPATIENT
Start: 2019-05-17 | End: 2019-05-17

## 2019-05-17 RX ORDER — DIPHENHYDRAMINE HYDROCHLORIDE 50 MG/ML
25 INJECTION INTRAMUSCULAR; INTRAVENOUS ONCE
Status: COMPLETED | OUTPATIENT
Start: 2019-05-17 | End: 2019-05-17

## 2019-05-17 RX ORDER — CLONIDINE HYDROCHLORIDE 0.1 MG/1
0.1 TABLET ORAL 2 TIMES DAILY
Status: DISCONTINUED | OUTPATIENT
Start: 2019-05-17 | End: 2019-05-19 | Stop reason: HOSPADM

## 2019-05-17 RX ORDER — LOSARTAN POTASSIUM 25 MG/1
25 TABLET ORAL DAILY
Status: DISCONTINUED | OUTPATIENT
Start: 2019-05-17 | End: 2019-05-19 | Stop reason: HOSPADM

## 2019-05-17 RX ORDER — DORZOLAMIDE HYDROCHLORIDE AND TIMOLOL MALEATE 20; 5 MG/ML; MG/ML
1 SOLUTION/ DROPS OPHTHALMIC 2 TIMES DAILY
Status: DISCONTINUED | OUTPATIENT
Start: 2019-05-17 | End: 2019-05-19 | Stop reason: HOSPADM

## 2019-05-17 RX ORDER — PANTOPRAZOLE SODIUM 40 MG/1
40 TABLET, DELAYED RELEASE ORAL
Status: DISCONTINUED | OUTPATIENT
Start: 2019-05-17 | End: 2019-05-19 | Stop reason: HOSPADM

## 2019-05-17 RX ADMIN — DICLOFENAC SODIUM 2 G: 10 GEL TOPICAL at 21:46

## 2019-05-17 RX ADMIN — LOPERAMIDE HYDROCHLORIDE 2 MG: 2 CAPSULE ORAL at 20:13

## 2019-05-17 RX ADMIN — VITAMIN D, TAB 1000IU (100/BT) 2000 UNITS: 25 TAB at 09:15

## 2019-05-17 RX ADMIN — LORATADINE 10 MG: 10 TABLET ORAL at 09:15

## 2019-05-17 RX ADMIN — INSULIN LISPRO 6 UNITS: 100 INJECTION, SOLUTION INTRAVENOUS; SUBCUTANEOUS at 17:28

## 2019-05-17 RX ADMIN — TORSEMIDE 200 MG: 20 TABLET ORAL at 09:15

## 2019-05-17 RX ADMIN — DORZOLAMIDE HYDROCHLORIDE AND TIMOLOL MALEATE 1 DROP: 20; 5 SOLUTION/ DROPS OPHTHALMIC at 17:28

## 2019-05-17 RX ADMIN — DORZOLAMIDE HYDROCHLORIDE AND TIMOLOL MALEATE 1 DROP: 20; 5 SOLUTION/ DROPS OPHTHALMIC at 09:16

## 2019-05-17 RX ADMIN — MELATONIN 6 MG: at 21:45

## 2019-05-17 RX ADMIN — LOSARTAN POTASSIUM 25 MG: 25 TABLET ORAL at 09:16

## 2019-05-17 RX ADMIN — ACETAMINOPHEN 975 MG: 325 TABLET, FILM COATED ORAL at 00:19

## 2019-05-17 RX ADMIN — SERTRALINE HYDROCHLORIDE 50 MG: 50 TABLET ORAL at 09:17

## 2019-05-17 RX ADMIN — ATORVASTATIN CALCIUM 20 MG: 20 TABLET, FILM COATED ORAL at 01:33

## 2019-05-17 RX ADMIN — CARVEDILOL 25 MG: 25 TABLET, FILM COATED ORAL at 09:15

## 2019-05-17 RX ADMIN — INSULIN GLARGINE 28 UNITS: 100 INJECTION, SOLUTION SUBCUTANEOUS at 21:45

## 2019-05-17 RX ADMIN — CARVEDILOL 25 MG: 25 TABLET, FILM COATED ORAL at 17:25

## 2019-05-17 RX ADMIN — LEVOTHYROXINE SODIUM 50 MCG: 50 TABLET ORAL at 05:27

## 2019-05-17 RX ADMIN — ATORVASTATIN CALCIUM 20 MG: 20 TABLET, FILM COATED ORAL at 17:27

## 2019-05-17 RX ADMIN — CINACALCET HYDROCHLORIDE 30 MG: 30 TABLET, FILM COATED ORAL at 09:17

## 2019-05-17 RX ADMIN — DIPHENHYDRAMINE HYDROCHLORIDE 25 MG: 50 INJECTION, SOLUTION INTRAMUSCULAR; INTRAVENOUS at 00:35

## 2019-05-17 RX ADMIN — GABAPENTIN 100 MG: 100 CAPSULE ORAL at 21:45

## 2019-05-17 RX ADMIN — INSULIN GLARGINE 28 UNITS: 100 INJECTION, SOLUTION SUBCUTANEOUS at 01:33

## 2019-05-17 RX ADMIN — MELATONIN 6 MG: at 01:34

## 2019-05-17 RX ADMIN — Medication 1 CAPSULE: at 09:16

## 2019-05-17 RX ADMIN — GABAPENTIN 100 MG: 100 CAPSULE ORAL at 09:15

## 2019-05-17 RX ADMIN — METOCLOPRAMIDE 10 MG: 5 INJECTION, SOLUTION INTRAMUSCULAR; INTRAVENOUS at 00:35

## 2019-05-17 RX ADMIN — CLONIDINE HYDROCHLORIDE 0.1 MG: 0.1 TABLET ORAL at 17:27

## 2019-05-17 RX ADMIN — ACETAMINOPHEN 650 MG: 325 TABLET, FILM COATED ORAL at 14:45

## 2019-05-17 RX ADMIN — CLONIDINE HYDROCHLORIDE 0.1 MG: 0.1 TABLET ORAL at 09:15

## 2019-05-17 RX ADMIN — PANTOPRAZOLE SODIUM 40 MG: 40 TABLET, DELAYED RELEASE ORAL at 05:27

## 2019-05-17 RX ADMIN — GABAPENTIN 100 MG: 100 CAPSULE ORAL at 17:25

## 2019-05-17 RX ADMIN — TORSEMIDE 200 MG: 20 TABLET ORAL at 17:26

## 2019-05-18 ENCOUNTER — APPOINTMENT (OUTPATIENT)
Dept: RADIOLOGY | Facility: HOSPITAL | Age: 54
DRG: 640 | End: 2019-05-18
Payer: MEDICARE

## 2019-05-18 ENCOUNTER — APPOINTMENT (OUTPATIENT)
Dept: DIALYSIS | Facility: HOSPITAL | Age: 54
DRG: 640 | End: 2019-05-18
Payer: MEDICARE

## 2019-05-18 LAB
ANION GAP SERPL CALCULATED.3IONS-SCNC: 9 MMOL/L (ref 4–13)
BUN SERPL-MCNC: 73 MG/DL (ref 5–25)
CALCIUM SERPL-MCNC: 7.8 MG/DL (ref 8.3–10.1)
CHLORIDE SERPL-SCNC: 98 MMOL/L (ref 100–108)
CO2 SERPL-SCNC: 25 MMOL/L (ref 21–32)
CREAT SERPL-MCNC: 8.75 MG/DL (ref 0.6–1.3)
GFR SERPL CREATININE-BSD FRML MDRD: 5 ML/MIN/1.73SQ M
GLUCOSE SERPL-MCNC: 104 MG/DL (ref 65–140)
GLUCOSE SERPL-MCNC: 165 MG/DL (ref 65–140)
GLUCOSE SERPL-MCNC: 235 MG/DL (ref 65–140)
GLUCOSE SERPL-MCNC: 250 MG/DL (ref 65–140)
GLUCOSE SERPL-MCNC: 63 MG/DL (ref 65–140)
GLUCOSE SERPL-MCNC: 81 MG/DL (ref 65–140)
POTASSIUM SERPL-SCNC: 4.5 MMOL/L (ref 3.5–5.3)
SODIUM SERPL-SCNC: 132 MMOL/L (ref 136–145)

## 2019-05-18 PROCEDURE — 78452 HT MUSCLE IMAGE SPECT MULT: CPT

## 2019-05-18 PROCEDURE — 80048 BASIC METABOLIC PNL TOTAL CA: CPT | Performed by: INTERNAL MEDICINE

## 2019-05-18 PROCEDURE — 82948 REAGENT STRIP/BLOOD GLUCOSE: CPT

## 2019-05-18 PROCEDURE — 99232 SBSQ HOSP IP/OBS MODERATE 35: CPT | Performed by: INTERNAL MEDICINE

## 2019-05-18 PROCEDURE — G0257 UNSCHED DIALYSIS ESRD PT HOS: HCPCS

## 2019-05-18 PROCEDURE — 5A1D70Z PERFORMANCE OF URINARY FILTRATION, INTERMITTENT, LESS THAN 6 HOURS PER DAY: ICD-10-PCS | Performed by: INTERNAL MEDICINE

## 2019-05-18 PROCEDURE — A9502 TC99M TETROFOSMIN: HCPCS

## 2019-05-18 PROCEDURE — 99222 1ST HOSP IP/OBS MODERATE 55: CPT | Performed by: INTERNAL MEDICINE

## 2019-05-18 PROCEDURE — 93017 CV STRESS TEST TRACING ONLY: CPT

## 2019-05-18 RX ORDER — DEXTROSE MONOHYDRATE 25 G/50ML
12.5 INJECTION, SOLUTION INTRAVENOUS ONCE
Status: COMPLETED | OUTPATIENT
Start: 2019-05-18 | End: 2019-05-18

## 2019-05-18 RX ORDER — METOCLOPRAMIDE HYDROCHLORIDE 5 MG/ML
10 INJECTION INTRAMUSCULAR; INTRAVENOUS EVERY 6 HOURS PRN
Status: DISCONTINUED | OUTPATIENT
Start: 2019-05-18 | End: 2019-05-18

## 2019-05-18 RX ORDER — METOCLOPRAMIDE HYDROCHLORIDE 5 MG/ML
10 INJECTION INTRAMUSCULAR; INTRAVENOUS ONCE
Status: COMPLETED | OUTPATIENT
Start: 2019-05-18 | End: 2019-05-18

## 2019-05-18 RX ADMIN — DICLOFENAC SODIUM 2 G: 10 GEL TOPICAL at 13:16

## 2019-05-18 RX ADMIN — VITAMIN D, TAB 1000IU (100/BT) 2000 UNITS: 25 TAB at 13:15

## 2019-05-18 RX ADMIN — ONDANSETRON 4 MG: 2 INJECTION INTRAMUSCULAR; INTRAVENOUS at 17:27

## 2019-05-18 RX ADMIN — GABAPENTIN 100 MG: 100 CAPSULE ORAL at 17:37

## 2019-05-18 RX ADMIN — ACETAMINOPHEN 650 MG: 325 TABLET, FILM COATED ORAL at 08:59

## 2019-05-18 RX ADMIN — DORZOLAMIDE HYDROCHLORIDE AND TIMOLOL MALEATE 1 DROP: 20; 5 SOLUTION/ DROPS OPHTHALMIC at 08:10

## 2019-05-18 RX ADMIN — CLONIDINE HYDROCHLORIDE 0.1 MG: 0.1 TABLET ORAL at 17:38

## 2019-05-18 RX ADMIN — LEVOTHYROXINE SODIUM 50 MCG: 50 TABLET ORAL at 05:38

## 2019-05-18 RX ADMIN — CARVEDILOL 25 MG: 25 TABLET, FILM COATED ORAL at 17:37

## 2019-05-18 RX ADMIN — Medication 1 CAPSULE: at 13:16

## 2019-05-18 RX ADMIN — METOCLOPRAMIDE 10 MG: 5 INJECTION, SOLUTION INTRAMUSCULAR; INTRAVENOUS at 19:51

## 2019-05-18 RX ADMIN — MELATONIN 6 MG: at 22:12

## 2019-05-18 RX ADMIN — DOXERCALCIFEROL 1 MCG: 2 INJECTION, SOLUTION INTRAVENOUS at 10:36

## 2019-05-18 RX ADMIN — ATORVASTATIN CALCIUM 20 MG: 20 TABLET, FILM COATED ORAL at 17:40

## 2019-05-18 RX ADMIN — LOPERAMIDE HYDROCHLORIDE 2 MG: 2 CAPSULE ORAL at 03:41

## 2019-05-18 RX ADMIN — INSULIN LISPRO 6 UNITS: 100 INJECTION, SOLUTION INTRAVENOUS; SUBCUTANEOUS at 17:41

## 2019-05-18 RX ADMIN — PANTOPRAZOLE SODIUM 40 MG: 40 TABLET, DELAYED RELEASE ORAL at 05:38

## 2019-05-18 RX ADMIN — SERTRALINE HYDROCHLORIDE 50 MG: 50 TABLET ORAL at 13:15

## 2019-05-18 RX ADMIN — DICLOFENAC SODIUM 2 G: 10 GEL TOPICAL at 08:10

## 2019-05-18 RX ADMIN — ALPRAZOLAM 0.25 MG: 0.25 TABLET ORAL at 07:23

## 2019-05-18 RX ADMIN — DICLOFENAC SODIUM 2 G: 10 GEL TOPICAL at 17:41

## 2019-05-18 RX ADMIN — CINACALCET HYDROCHLORIDE 30 MG: 30 TABLET, FILM COATED ORAL at 13:16

## 2019-05-18 RX ADMIN — ALPRAZOLAM 0.25 MG: 0.25 TABLET ORAL at 10:34

## 2019-05-18 RX ADMIN — DORZOLAMIDE HYDROCHLORIDE AND TIMOLOL MALEATE 1 DROP: 20; 5 SOLUTION/ DROPS OPHTHALMIC at 17:41

## 2019-05-18 RX ADMIN — DEXTROSE MONOHYDRATE 13 ML: 25 INJECTION, SOLUTION INTRAVENOUS at 19:51

## 2019-05-18 RX ADMIN — LOPERAMIDE HYDROCHLORIDE 2 MG: 2 CAPSULE ORAL at 20:00

## 2019-05-18 RX ADMIN — TORSEMIDE 200 MG: 20 TABLET ORAL at 17:37

## 2019-05-18 RX ADMIN — LORATADINE 10 MG: 10 TABLET ORAL at 13:15

## 2019-05-18 RX ADMIN — ONDANSETRON 4 MG: 2 INJECTION INTRAMUSCULAR; INTRAVENOUS at 12:07

## 2019-05-18 RX ADMIN — GABAPENTIN 100 MG: 100 CAPSULE ORAL at 22:12

## 2019-05-19 ENCOUNTER — APPOINTMENT (INPATIENT)
Dept: RADIOLOGY | Facility: HOSPITAL | Age: 54
DRG: 640 | End: 2019-05-19
Payer: MEDICARE

## 2019-05-19 VITALS
DIASTOLIC BLOOD PRESSURE: 45 MMHG | HEIGHT: 66 IN | SYSTOLIC BLOOD PRESSURE: 108 MMHG | WEIGHT: 261.6 LBS | TEMPERATURE: 97.5 F | OXYGEN SATURATION: 97 % | HEART RATE: 69 BPM | BODY MASS INDEX: 42.04 KG/M2 | RESPIRATION RATE: 18 BRPM

## 2019-05-19 LAB
GLUCOSE SERPL-MCNC: 257 MG/DL (ref 65–140)
GLUCOSE SERPL-MCNC: 281 MG/DL (ref 65–140)
GLUCOSE SERPL-MCNC: 93 MG/DL (ref 65–140)

## 2019-05-19 PROCEDURE — 99239 HOSP IP/OBS DSCHRG MGMT >30: CPT | Performed by: INTERNAL MEDICINE

## 2019-05-19 PROCEDURE — 78452 HT MUSCLE IMAGE SPECT MULT: CPT | Performed by: INTERNAL MEDICINE

## 2019-05-19 PROCEDURE — 99232 SBSQ HOSP IP/OBS MODERATE 35: CPT | Performed by: INTERNAL MEDICINE

## 2019-05-19 PROCEDURE — 82948 REAGENT STRIP/BLOOD GLUCOSE: CPT

## 2019-05-19 PROCEDURE — 93016 CV STRESS TEST SUPVJ ONLY: CPT | Performed by: INTERNAL MEDICINE

## 2019-05-19 PROCEDURE — 93018 CV STRESS TEST I&R ONLY: CPT | Performed by: INTERNAL MEDICINE

## 2019-05-19 RX ORDER — BUTALBITAL, ACETAMINOPHEN AND CAFFEINE 50; 325; 40 MG/1; MG/1; MG/1
1 TABLET ORAL DAILY PRN
Qty: 10 TABLET | Refills: 0 | Status: SHIPPED | OUTPATIENT
Start: 2019-05-19 | End: 2019-10-11

## 2019-05-19 RX ADMIN — PANTOPRAZOLE SODIUM 40 MG: 40 TABLET, DELAYED RELEASE ORAL at 06:25

## 2019-05-19 RX ADMIN — REGADENOSON: 0.08 INJECTION, SOLUTION INTRAVENOUS at 13:25

## 2019-05-19 RX ADMIN — TORSEMIDE 200 MG: 20 TABLET ORAL at 09:13

## 2019-05-19 RX ADMIN — LORATADINE 10 MG: 10 TABLET ORAL at 09:13

## 2019-05-19 RX ADMIN — DICLOFENAC SODIUM 2 G: 10 GEL TOPICAL at 09:14

## 2019-05-19 RX ADMIN — Medication 1 CAPSULE: at 09:14

## 2019-05-19 RX ADMIN — DICLOFENAC SODIUM 2 G: 10 GEL TOPICAL at 11:49

## 2019-05-19 RX ADMIN — CLONIDINE HYDROCHLORIDE 0.1 MG: 0.1 TABLET ORAL at 09:13

## 2019-05-19 RX ADMIN — GABAPENTIN 100 MG: 100 CAPSULE ORAL at 09:13

## 2019-05-19 RX ADMIN — VITAMIN D, TAB 1000IU (100/BT) 2000 UNITS: 25 TAB at 09:13

## 2019-05-19 RX ADMIN — INSULIN LISPRO 6 UNITS: 100 INJECTION, SOLUTION INTRAVENOUS; SUBCUTANEOUS at 11:49

## 2019-05-19 RX ADMIN — LEVOTHYROXINE SODIUM 50 MCG: 50 TABLET ORAL at 06:25

## 2019-05-19 RX ADMIN — LOSARTAN POTASSIUM 25 MG: 25 TABLET ORAL at 09:14

## 2019-05-19 RX ADMIN — CARVEDILOL 25 MG: 25 TABLET, FILM COATED ORAL at 09:13

## 2019-05-19 RX ADMIN — SERTRALINE HYDROCHLORIDE 50 MG: 50 TABLET ORAL at 09:13

## 2019-05-19 RX ADMIN — CINACALCET HYDROCHLORIDE 30 MG: 30 TABLET, FILM COATED ORAL at 09:14

## 2019-05-19 RX ADMIN — GABAPENTIN 100 MG: 100 CAPSULE ORAL at 16:07

## 2019-05-19 RX ADMIN — DORZOLAMIDE HYDROCHLORIDE AND TIMOLOL MALEATE 1 DROP: 20; 5 SOLUTION/ DROPS OPHTHALMIC at 09:14

## 2019-05-20 LAB
ATRIAL RATE: 79 BPM
ATRIAL RATE: 91 BPM
ATRIAL RATE: 91 BPM
MAX DIASTOLIC BP: 52 MMHG
MAX HEART RATE: 90 BPM
MAX PREDICTED HEART RATE: 168 BPM
MAX. SYSTOLIC BP: 124 MMHG
P AXIS: 134 DEGREES
P AXIS: 143 DEGREES
P AXIS: 60 DEGREES
PR INTERVAL: 154 MS
PR INTERVAL: 170 MS
PR INTERVAL: 180 MS
PROTOCOL NAME: NORMAL
QRS AXIS: 147 DEGREES
QRS AXIS: 157 DEGREES
QRS AXIS: 35 DEGREES
QRSD INTERVAL: 88 MS
QRSD INTERVAL: 92 MS
QRSD INTERVAL: 96 MS
QT INTERVAL: 328 MS
QT INTERVAL: 362 MS
QT INTERVAL: 398 MS
QTC INTERVAL: 403 MS
QTC INTERVAL: 445 MS
QTC INTERVAL: 456 MS
REASON FOR TERMINATION: NORMAL
T WAVE AXIS: 84 DEGREES
T WAVE AXIS: 89 DEGREES
T WAVE AXIS: 96 DEGREES
TARGET HR FORMULA: NORMAL
TEST INDICATION: NORMAL
TIME IN EXERCISE PHASE: NORMAL
VENTRICULAR RATE: 79 BPM
VENTRICULAR RATE: 91 BPM
VENTRICULAR RATE: 91 BPM

## 2019-05-20 PROCEDURE — 93010 ELECTROCARDIOGRAM REPORT: CPT | Performed by: INTERNAL MEDICINE

## 2019-06-14 ENCOUNTER — TELEPHONE (OUTPATIENT)
Dept: GASTROENTEROLOGY | Facility: AMBULARY SURGERY CENTER | Age: 54
End: 2019-06-14

## 2019-06-27 ENCOUNTER — APPOINTMENT (OUTPATIENT)
Dept: LAB | Facility: CLINIC | Age: 54
End: 2019-06-27
Payer: MEDICARE

## 2019-06-27 ENCOUNTER — TRANSCRIBE ORDERS (OUTPATIENT)
Dept: LAB | Facility: CLINIC | Age: 54
End: 2019-06-27

## 2019-06-27 DIAGNOSIS — Z86.711 PERSONAL HISTORY OF PE (PULMONARY EMBOLISM): ICD-10-CM

## 2019-06-27 DIAGNOSIS — Z79.01 LONG TERM (CURRENT) USE OF ANTICOAGULANTS: ICD-10-CM

## 2019-08-26 ENCOUNTER — HOSPITAL ENCOUNTER (INPATIENT)
Facility: HOSPITAL | Age: 54
LOS: 5 days | Discharge: HOME HEALTH | DRG: 623 | End: 2019-08-31
Attending: EMERGENCY MEDICINE | Admitting: INTERNAL MEDICINE
Payer: MEDICARE

## 2019-08-26 ENCOUNTER — APPOINTMENT (EMERGENCY)
Dept: RADIOLOGY | Facility: HOSPITAL | Age: 54
DRG: 623 | End: 2019-08-26
Payer: MEDICARE

## 2019-08-26 DIAGNOSIS — M25.561 RIGHT KNEE PAIN: ICD-10-CM

## 2019-08-26 DIAGNOSIS — E87.5 HYPERKALEMIA: ICD-10-CM

## 2019-08-26 DIAGNOSIS — E11.621 DIABETIC FOOT ULCER (HCC): ICD-10-CM

## 2019-08-26 DIAGNOSIS — E11.622 DIABETIC ULCER OF LEFT ANKLE (HCC): ICD-10-CM

## 2019-08-26 DIAGNOSIS — Z99.2 ESRD ON HEMODIALYSIS (HCC): Primary | Chronic | ICD-10-CM

## 2019-08-26 DIAGNOSIS — L97.509 DIABETIC FOOT ULCER (HCC): ICD-10-CM

## 2019-08-26 DIAGNOSIS — B37.89 CANDIDIASIS OF BREAST: ICD-10-CM

## 2019-08-26 DIAGNOSIS — N18.6 ESRD ON HEMODIALYSIS (HCC): Primary | Chronic | ICD-10-CM

## 2019-08-26 DIAGNOSIS — E11.9 TYPE 2 DIABETES MELLITUS (HCC): ICD-10-CM

## 2019-08-26 DIAGNOSIS — L97.329 DIABETIC ULCER OF LEFT ANKLE (HCC): ICD-10-CM

## 2019-08-26 PROBLEM — D64.9 CHRONIC ANEMIA: Status: ACTIVE | Noted: 2019-08-26

## 2019-08-26 LAB
ANION GAP SERPL CALCULATED.3IONS-SCNC: 8 MMOL/L (ref 4–13)
ATRIAL RATE: 70 BPM
BASOPHILS # BLD AUTO: 0.02 THOUSANDS/ΜL (ref 0–0.1)
BASOPHILS NFR BLD AUTO: 0 % (ref 0–1)
BUN SERPL-MCNC: 58 MG/DL (ref 5–25)
CALCIUM SERPL-MCNC: 7.9 MG/DL (ref 8.3–10.1)
CHLORIDE SERPL-SCNC: 97 MMOL/L (ref 100–108)
CO2 SERPL-SCNC: 27 MMOL/L (ref 21–32)
CREAT SERPL-MCNC: 9.26 MG/DL (ref 0.6–1.3)
CRP SERPL QL: 27.2 MG/L
EOSINOPHIL # BLD AUTO: 0.13 THOUSAND/ΜL (ref 0–0.61)
EOSINOPHIL NFR BLD AUTO: 2 % (ref 0–6)
ERYTHROCYTE [DISTWIDTH] IN BLOOD BY AUTOMATED COUNT: 14.1 % (ref 11.6–15.1)
ERYTHROCYTE [SEDIMENTATION RATE] IN BLOOD: 81 MM/HOUR (ref 0–20)
GFR SERPL CREATININE-BSD FRML MDRD: 4 ML/MIN/1.73SQ M
GLUCOSE SERPL-MCNC: 126 MG/DL (ref 65–140)
GLUCOSE SERPL-MCNC: 294 MG/DL (ref 65–140)
GLUCOSE SERPL-MCNC: 303 MG/DL (ref 65–140)
GLUCOSE SERPL-MCNC: 407 MG/DL (ref 65–140)
HCT VFR BLD AUTO: 25.3 % (ref 34.8–46.1)
HGB BLD-MCNC: 8.3 G/DL (ref 11.5–15.4)
IMM GRANULOCYTES # BLD AUTO: 0.03 THOUSAND/UL (ref 0–0.2)
IMM GRANULOCYTES NFR BLD AUTO: 1 % (ref 0–2)
INR PPP: 7.06 (ref 0.84–1.19)
LYMPHOCYTES # BLD AUTO: 0.84 THOUSANDS/ΜL (ref 0.6–4.47)
LYMPHOCYTES NFR BLD AUTO: 15 % (ref 14–44)
MCH RBC QN AUTO: 30.7 PG (ref 26.8–34.3)
MCHC RBC AUTO-ENTMCNC: 32.8 G/DL (ref 31.4–37.4)
MCV RBC AUTO: 94 FL (ref 82–98)
MONOCYTES # BLD AUTO: 0.48 THOUSAND/ΜL (ref 0.17–1.22)
MONOCYTES NFR BLD AUTO: 8 % (ref 4–12)
NEUTROPHILS # BLD AUTO: 4.27 THOUSANDS/ΜL (ref 1.85–7.62)
NEUTS SEG NFR BLD AUTO: 74 % (ref 43–75)
NRBC BLD AUTO-RTO: 0 /100 WBCS
P AXIS: 42 DEGREES
PLATELET # BLD AUTO: 136 THOUSANDS/UL (ref 149–390)
PMV BLD AUTO: 10.7 FL (ref 8.9–12.7)
POTASSIUM SERPL-SCNC: 5.8 MMOL/L (ref 3.5–5.3)
PR INTERVAL: 192 MS
PROTHROMBIN TIME: 60.4 SECONDS (ref 11.6–14.5)
QRS AXIS: 14 DEGREES
QRSD INTERVAL: 84 MS
QT INTERVAL: 396 MS
QTC INTERVAL: 427 MS
RBC # BLD AUTO: 2.7 MILLION/UL (ref 3.81–5.12)
SODIUM SERPL-SCNC: 132 MMOL/L (ref 136–145)
T WAVE AXIS: 71 DEGREES
VENTRICULAR RATE: 70 BPM
WBC # BLD AUTO: 5.77 THOUSAND/UL (ref 4.31–10.16)

## 2019-08-26 PROCEDURE — 80048 BASIC METABOLIC PNL TOTAL CA: CPT | Performed by: EMERGENCY MEDICINE

## 2019-08-26 PROCEDURE — 85025 COMPLETE CBC W/AUTO DIFF WBC: CPT | Performed by: EMERGENCY MEDICINE

## 2019-08-26 PROCEDURE — 99285 EMERGENCY DEPT VISIT HI MDM: CPT | Performed by: EMERGENCY MEDICINE

## 2019-08-26 PROCEDURE — 99223 1ST HOSP IP/OBS HIGH 75: CPT | Performed by: INTERNAL MEDICINE

## 2019-08-26 PROCEDURE — 73610 X-RAY EXAM OF ANKLE: CPT

## 2019-08-26 PROCEDURE — 82948 REAGENT STRIP/BLOOD GLUCOSE: CPT

## 2019-08-26 PROCEDURE — 86140 C-REACTIVE PROTEIN: CPT | Performed by: EMERGENCY MEDICINE

## 2019-08-26 PROCEDURE — 96365 THER/PROPH/DIAG IV INF INIT: CPT

## 2019-08-26 PROCEDURE — 36415 COLL VENOUS BLD VENIPUNCTURE: CPT | Performed by: EMERGENCY MEDICINE

## 2019-08-26 PROCEDURE — 85610 PROTHROMBIN TIME: CPT | Performed by: INTERNAL MEDICINE

## 2019-08-26 PROCEDURE — 87040 BLOOD CULTURE FOR BACTERIA: CPT | Performed by: EMERGENCY MEDICINE

## 2019-08-26 PROCEDURE — 99285 EMERGENCY DEPT VISIT HI MDM: CPT

## 2019-08-26 PROCEDURE — 99222 1ST HOSP IP/OBS MODERATE 55: CPT | Performed by: INTERNAL MEDICINE

## 2019-08-26 PROCEDURE — 85652 RBC SED RATE AUTOMATED: CPT | Performed by: INTERNAL MEDICINE

## 2019-08-26 PROCEDURE — 93005 ELECTROCARDIOGRAM TRACING: CPT

## 2019-08-26 PROCEDURE — 93010 ELECTROCARDIOGRAM REPORT: CPT | Performed by: INTERNAL MEDICINE

## 2019-08-26 RX ORDER — CLONIDINE HYDROCHLORIDE 0.1 MG/1
0.1 TABLET ORAL 2 TIMES DAILY
Status: DISCONTINUED | OUTPATIENT
Start: 2019-08-26 | End: 2019-08-30

## 2019-08-26 RX ORDER — LORATADINE 10 MG/1
10 TABLET ORAL
Status: DISCONTINUED | OUTPATIENT
Start: 2019-08-26 | End: 2019-08-31 | Stop reason: HOSPADM

## 2019-08-26 RX ORDER — LORATADINE 10 MG/1
10 TABLET ORAL DAILY
Status: DISCONTINUED | OUTPATIENT
Start: 2019-08-26 | End: 2019-08-26

## 2019-08-26 RX ORDER — CINACALCET 30 MG/1
30 TABLET, FILM COATED ORAL DAILY
Status: DISCONTINUED | OUTPATIENT
Start: 2019-08-26 | End: 2019-08-31 | Stop reason: HOSPADM

## 2019-08-26 RX ORDER — ALPRAZOLAM 0.25 MG/1
0.25 TABLET ORAL 2 TIMES DAILY PRN
Status: DISCONTINUED | OUTPATIENT
Start: 2019-08-26 | End: 2019-08-31 | Stop reason: HOSPADM

## 2019-08-26 RX ORDER — PANTOPRAZOLE SODIUM 40 MG/1
40 TABLET, DELAYED RELEASE ORAL DAILY
Status: DISCONTINUED | OUTPATIENT
Start: 2019-08-26 | End: 2019-08-31 | Stop reason: HOSPADM

## 2019-08-26 RX ORDER — OXYCODONE HYDROCHLORIDE 5 MG/1
5 TABLET ORAL ONCE
Status: COMPLETED | OUTPATIENT
Start: 2019-08-26 | End: 2019-08-26

## 2019-08-26 RX ORDER — LEVOTHYROXINE SODIUM 0.05 MG/1
50 TABLET ORAL
Status: DISCONTINUED | OUTPATIENT
Start: 2019-08-27 | End: 2019-08-31 | Stop reason: HOSPADM

## 2019-08-26 RX ORDER — CHOLECALCIFEROL (VITAMIN D3) 10 MCG
1 TABLET ORAL
Status: DISCONTINUED | OUTPATIENT
Start: 2019-08-26 | End: 2019-08-31 | Stop reason: HOSPADM

## 2019-08-26 RX ORDER — TORSEMIDE 20 MG/1
200 TABLET ORAL EVERY 12 HOURS
Status: DISCONTINUED | OUTPATIENT
Start: 2019-08-26 | End: 2019-08-30

## 2019-08-26 RX ORDER — GLIMEPIRIDE 2 MG/1
1 TABLET ORAL 2 TIMES DAILY
Status: DISCONTINUED | OUTPATIENT
Start: 2019-08-26 | End: 2019-08-31 | Stop reason: HOSPADM

## 2019-08-26 RX ORDER — LATANOPROST 50 UG/ML
1 SOLUTION/ DROPS OPHTHALMIC
Status: DISCONTINUED | OUTPATIENT
Start: 2019-08-26 | End: 2019-08-31 | Stop reason: HOSPADM

## 2019-08-26 RX ORDER — LOSARTAN POTASSIUM 25 MG/1
25 TABLET ORAL DAILY
Status: DISCONTINUED | OUTPATIENT
Start: 2019-08-26 | End: 2019-08-30

## 2019-08-26 RX ORDER — CARVEDILOL 25 MG/1
25 TABLET ORAL 2 TIMES DAILY WITH MEALS
Status: DISCONTINUED | OUTPATIENT
Start: 2019-08-26 | End: 2019-08-31 | Stop reason: HOSPADM

## 2019-08-26 RX ORDER — VANCOMYCIN HYDROCHLORIDE 1 G/200ML
15 INJECTION, SOLUTION INTRAVENOUS ONCE
Status: DISCONTINUED | OUTPATIENT
Start: 2019-08-26 | End: 2019-08-26

## 2019-08-26 RX ORDER — OXYCODONE HYDROCHLORIDE 5 MG/1
5 TABLET ORAL EVERY 6 HOURS PRN
Status: DISCONTINUED | OUTPATIENT
Start: 2019-08-26 | End: 2019-08-31 | Stop reason: HOSPADM

## 2019-08-26 RX ORDER — OXYCODONE HYDROCHLORIDE 10 MG/1
10 TABLET ORAL EVERY 6 HOURS PRN
Status: DISCONTINUED | OUTPATIENT
Start: 2019-08-26 | End: 2019-08-31 | Stop reason: HOSPADM

## 2019-08-26 RX ORDER — CYCLOBENZAPRINE HCL 10 MG
10 TABLET ORAL
Status: DISCONTINUED | OUTPATIENT
Start: 2019-08-26 | End: 2019-08-31 | Stop reason: HOSPADM

## 2019-08-26 RX ORDER — LANOLIN ALCOHOL/MO/W.PET/CERES
3 CREAM (GRAM) TOPICAL
Status: DISCONTINUED | OUTPATIENT
Start: 2019-08-26 | End: 2019-08-31 | Stop reason: HOSPADM

## 2019-08-26 RX ORDER — INSULIN GLARGINE 100 [IU]/ML
28 INJECTION, SOLUTION SUBCUTANEOUS EVERY MORNING
Status: DISCONTINUED | OUTPATIENT
Start: 2019-08-27 | End: 2019-08-29

## 2019-08-26 RX ORDER — ATORVASTATIN CALCIUM 20 MG/1
20 TABLET, FILM COATED ORAL EVERY EVENING
Status: DISCONTINUED | OUTPATIENT
Start: 2019-08-26 | End: 2019-08-31 | Stop reason: HOSPADM

## 2019-08-26 RX ORDER — PREDNISOLONE ACETATE 10 MG/ML
1 SUSPENSION/ DROPS OPHTHALMIC 4 TIMES DAILY
Status: DISCONTINUED | OUTPATIENT
Start: 2019-08-26 | End: 2019-08-31 | Stop reason: HOSPADM

## 2019-08-26 RX ORDER — INSULIN GLARGINE 100 [IU]/ML
28 INJECTION, SOLUTION SUBCUTANEOUS
Status: DISCONTINUED | OUTPATIENT
Start: 2019-08-26 | End: 2019-08-26

## 2019-08-26 RX ORDER — BRIMONIDINE TARTRATE 0.15 %
1 DROPS OPHTHALMIC (EYE) 2 TIMES DAILY
Status: DISCONTINUED | OUTPATIENT
Start: 2019-08-26 | End: 2019-08-31 | Stop reason: HOSPADM

## 2019-08-26 RX ORDER — MELATONIN
2000 DAILY
Status: DISCONTINUED | OUTPATIENT
Start: 2019-08-26 | End: 2019-08-31 | Stop reason: HOSPADM

## 2019-08-26 RX ORDER — ACETAMINOPHEN 325 MG/1
650 TABLET ORAL EVERY 6 HOURS PRN
Status: DISCONTINUED | OUTPATIENT
Start: 2019-08-26 | End: 2019-08-31 | Stop reason: HOSPADM

## 2019-08-26 RX ORDER — GABAPENTIN 100 MG/1
100 CAPSULE ORAL 3 TIMES DAILY
Status: DISCONTINUED | OUTPATIENT
Start: 2019-08-26 | End: 2019-08-31 | Stop reason: HOSPADM

## 2019-08-26 RX ADMIN — GABAPENTIN 100 MG: 100 CAPSULE ORAL at 20:26

## 2019-08-26 RX ADMIN — CLONIDINE HYDROCHLORIDE 0.1 MG: 0.1 TABLET ORAL at 17:40

## 2019-08-26 RX ADMIN — METRONIDAZOLE 500 MG: 500 INJECTION, SOLUTION INTRAVENOUS at 16:41

## 2019-08-26 RX ADMIN — PANTOPRAZOLE SODIUM 40 MG: 40 TABLET, DELAYED RELEASE ORAL at 16:55

## 2019-08-26 RX ADMIN — ACETAMINOPHEN 650 MG: 325 TABLET ORAL at 18:41

## 2019-08-26 RX ADMIN — MELATONIN 3 MG: 3 TAB ORAL at 21:55

## 2019-08-26 RX ADMIN — CARVEDILOL 25 MG: 25 TABLET, FILM COATED ORAL at 16:33

## 2019-08-26 RX ADMIN — CEFEPIME HYDROCHLORIDE 2000 MG: 2 INJECTION, POWDER, FOR SOLUTION INTRAVENOUS at 11:57

## 2019-08-26 RX ADMIN — PREDNISOLONE ACETATE 1 DROP: 10 SUSPENSION/ DROPS OPHTHALMIC at 21:55

## 2019-08-26 RX ADMIN — ATORVASTATIN CALCIUM 20 MG: 20 TABLET, FILM COATED ORAL at 17:40

## 2019-08-26 RX ADMIN — INSULIN HUMAN 10 UNITS: 100 INJECTION, SOLUTION PARENTERAL at 14:19

## 2019-08-26 RX ADMIN — LORATADINE 10 MG: 10 TABLET ORAL at 16:33

## 2019-08-26 RX ADMIN — OXYCODONE HYDROCHLORIDE 10 MG: 10 TABLET ORAL at 20:27

## 2019-08-26 RX ADMIN — SERTRALINE HYDROCHLORIDE 50 MG: 50 TABLET ORAL at 16:55

## 2019-08-26 RX ADMIN — CYCLOBENZAPRINE HYDROCHLORIDE 10 MG: 10 TABLET, FILM COATED ORAL at 21:55

## 2019-08-26 RX ADMIN — INSULIN LISPRO 2 UNITS: 100 INJECTION, SOLUTION INTRAVENOUS; SUBCUTANEOUS at 21:57

## 2019-08-26 RX ADMIN — GABAPENTIN 100 MG: 100 CAPSULE ORAL at 16:33

## 2019-08-26 RX ADMIN — VANCOMYCIN HYDROCHLORIDE 1750 MG: 1 INJECTION, POWDER, LYOPHILIZED, FOR SOLUTION INTRAVENOUS at 13:13

## 2019-08-26 RX ADMIN — PREDNISOLONE ACETATE 1 DROP: 10 SUSPENSION/ DROPS OPHTHALMIC at 17:42

## 2019-08-26 RX ADMIN — TORSEMIDE 200 MG: 20 TABLET ORAL at 16:47

## 2019-08-26 RX ADMIN — OXYCODONE HYDROCHLORIDE 5 MG: 5 TABLET ORAL at 11:06

## 2019-08-26 RX ADMIN — LOSARTAN POTASSIUM 25 MG: 25 TABLET, FILM COATED ORAL at 16:56

## 2019-08-26 RX ADMIN — METRONIDAZOLE 500 MG: 500 INJECTION, SOLUTION INTRAVENOUS at 23:56

## 2019-08-26 RX ADMIN — VITAMIN D, TAB 1000IU (100/BT) 2000 UNITS: 25 TAB at 16:55

## 2019-08-26 RX ADMIN — CINACALCET HYDROCHLORIDE 30 MG: 30 TABLET, FILM COATED ORAL at 17:00

## 2019-08-26 RX ADMIN — BRIMONIDINE TARTRATE 1 DROP: 1.5 SOLUTION OPHTHALMIC at 17:41

## 2019-08-26 RX ADMIN — LATANOPROST 1 DROP: 50 SOLUTION OPHTHALMIC at 21:55

## 2019-08-26 RX ADMIN — Medication 1 CAPSULE: at 16:36

## 2019-08-26 RX ADMIN — TIMOLOL MALEATE 1 DROP: 2.5 SOLUTION OPHTHALMIC at 17:43

## 2019-08-26 RX ADMIN — OXYCODONE HYDROCHLORIDE 10 MG: 10 TABLET ORAL at 14:23

## 2019-08-26 NOTE — H&P
H&P- Gilberto Westbrook 1965, 48 y o  female MRN: [id number]    Unit/Bed#: ED 21 Encounter: [id number]    Primary Care Provider: Matilde Carter MD   Date and time admitted to hospital: 8/26/2019 10:23 AM        * Diabetic foot ulcer ([facility] )  Assessment & Plan  · POA  · No fever or leukocytosis  Elevated CRP noted  · X-ray obtain in ER, shows No acute osseous abnormality  · Left heel wound/ ulcer with pus like discharge and significant tenderness  PLAN:  · Started on vancomycin, cefepime and Flagyl in ER, continue same for now  · Podiatry consult  Defer to Podiatry for further imaging including MRI  · Check sed rate  · Check blood cultures  · Check wound culture from the drainage from ulcer  · Consider ID evaluation if needed  Hyperkalemia  Assessment & Plan  Potassium 5 8 In hemodialysis patient  Obtain EKG  Monitor on telemetry  Last hemodialysis on Saturday  Nephrology consult  Supratherapeutic INR  Assessment & Plan  As mentioned in DVT  Anemia in end-stage renal disease (HCC)  Assessment & Plan  Baseline hemoglobin seems between 8-10  Hemoglobin at baseline  Trend CBC  Type 2 diabetes mellitus ([place] [address] )  Assessment & Plan  Lab Results   Component Value Date    HGBA1C 8 9 (H) 04/28/2019       No results for input(s): POCGLU in the last 72 hours  Blood Sugar Average: Last 72 hrs:     Not well controlled as noted by HbA1c  On 28 units Lantus at home  Continue basal insulin  Insulin sliding scale and Accu-Cheks  Obtain repeat HbA1c     ESRD on hemodialysis [facility])  Assessment & Plan  On Tuesday Thursday Saturday schedule  Last dialysis on Saturday which was cut short due to her foot pain per patient  Potassium mildly elevated at 5 8  Obtain EKG  Nephrology consult  History of DVT (deep vein thrombosis)  Assessment & Plan  · On Coumadin  · INR checked in ER which is elevated at 7   · No active signs of bleeding  · Hemoglobin 8 1 which is her baseline    · Hold Coumadin and trend INR for now  · Will not reverse INR until patient is bleeding or unless any surgical intervention is planned           VTE Prophylaxis: Warfarin (Coumadin)  / sequential compression device   Code Status:  Full code      Anticipated Length of Stay:  Patient will be admitted on an Inpatient basis with an anticipated length of stay of  more than 2 midnights  Justification for Hospital Stay:  Ongoing evaluation    Total Time for Visit, including Counseling / Coordination of Care: 45 minutes  Greater than 50% of this total time spent on direct patient counseling and coordination of care  Chief Complaint:   Left heel ulcer and pain    History of Present Illness:    Andreas Grimes is a 48 y o  female with medical history of end-stage renal disease on hemodialysis, diabetes on insulin, DVT on Coumadin who presents with left heel pain and ulcer  Patient has known history of diabetes and she is on insulin for that  She reports that her blood sugar at home is very variable ranging from 40- 350  She takes Lantus in the morning and uses low sliding scale as needed  She follows up with podiatrist   She had known wound on left heel although since Saturday, she started noticing that wound was more open and painful and there was pus like discharge coming out of it  Her boyfriend put a dressing on it but progressively pain was getting worse and she is not able to bear any weight on left heel due to significant pain and tenderness in the ulcer area  She continues to have pus like drainage from the wound  She also reports that she had low-grade fever at home  Her last dialysis treatment was on Saturday which was cut short due to her heel pain  She has what sounds like osteoarthritis in her knees and due to inability to bear weight on her left heel, she has been putting more weight on her right side which has been causing worsening right knee pain  She denies any chills or rigors      Review of Systems:    Review of Systems   Constitutional: Positive for activity change, fatigue and fever  Negative for appetite change and chills  HENT: Negative  Respiratory: Negative  Cardiovascular: Negative  Gastrointestinal: Negative  Genitourinary: Negative  Musculoskeletal: Positive for arthralgias  Skin: Positive for wound  Neurological: Negative  Past Medical and Surgical History:     Past Medical History:   Diagnosis Date    Abnormal uterine bleeding (AUB)     Anxiety     Diabetes mellitus ([facility] )     Disease of thyroid gland     DVT (deep venous thrombosis) (HCC)     End stage kidney disease (HCC)     Foot ulcer due to secondary DM ([facility] )     Hypertension     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     right eye    Morbid obesity (HCC)     Panic attacks     Reflux esophagitis     Thrombophlebitis of saphenous vein     Warfarin anticoagulation        Past Surgical History:   Procedure Laterality Date    ARTERIOVENOUS GRAFT PLACEMENT      CERVICAL BIOPSY  W/ LOOP ELECTRODE EXCISION       SECTION      DIALYSIS FISTULA CREATION      DILATION AND CURETTAGE OF UTERUS      ENDOMETRIAL ABLATION W/ NOVASURE      EYE SURGERY      HYSTERECTOMY      @ age 57 (complete)    OOPHORECTOMY Bilateral     @ age 57   [facility] PERICARDIUM SURGERY      OK COLONOSCOPY FLX DX W/COLLJ SPEC WHEN PFRMD N/A 3/13/2019    Procedure: COLONOSCOPY;  Surgeon: Veronika Fuentes MD;  Location: [facility]; Service: Gastroenterology    OK ESOPHAGOGASTRODUODENOSCOPY TRANSORAL DIAGNOSTIC N/A 3/13/2019    Procedure: ESOPHAGOGASTRODUODENOSCOPY (EGD); Surgeon: Veronika Fuentes MD;  Location: [facility];   Service: Gastroenterology    OK LAPAROSCOPY W TOT HYSTERECT UTERUS 250 GRAM OR LESS N/A 2016    Procedure: HYSTERECTOMY LAPAROSCOPIC TOTAL [facility]), with bilateral salpingectomy;  Surgeon: Aurora Vigil DO;  Location: [facility];  Service: Gynecology    THROMBECTOMY / ARTERIOVENOUS GRAFT REVISION      TOE SURGERY Right removal of the 4th toe       Meds/Allergies:    Prior to Admission medications    Medication Sig Start Date End Date Taking? Authorizing Provider   ALPRAZolam Robin Broom) 0 25 mg tablet Take 0 25 mg by mouth 2 (two) times a day as needed for anxiety    Historical Provider, MD   atorvastatin (LIPITOR) 20 mg tablet Take 20 mg by mouth every evening  4/24/18   Historical Provider, MD   atropine (ISOPTO ATROPINE) 1 % ophthalmic solution  2/4/19   Historical Provider, MD CURIEL Complex-C-Folic Acid (TRIPHROCAPS) 1 MG CAPS Take 1 capsule (1 mg total) by mouth daily 4/28/19   LOIS Okeefe   brimonidine (ALPHAGAN P) 0 15 % ophthalmic solution Administer 1 drop to both eyes 2 (two) times a day     Historical Provider, MD   butalbital-acetaminophen-caffeine (FIORICET,ESGIC) -40 mg per tablet Take 1 tablet by mouth daily as needed for headaches 5/19/19   Issac Avendano MD   CALCIUM CARBONATE PO Take 1,000 mg by mouth daily      Historical Provider, MD   carvedilol (COREG) 25 mg tablet Take 25 mg by mouth 2 (two) times a day with meals      Historical Provider, MD   cholecalciferol (VITAMIN D3) 1,000 units tablet Take 2,000 Units by mouth daily    Historical Provider, MD   cinacalcet (SENSIPAR) 30 mg tablet Take 30 mg by mouth daily    Historical Provider, MD   cloNIDine (CATAPRES) 0 1 mg tablet Take 0 1 mg by mouth 2 (two) times a day    Historical Provider, MD   cyclobenzaprine (FLEXERIL) 10 mg tablet Take 10 mg by mouth daily at bedtime    Historical Provider, MD   diclofenac sodium (VOLTAREN) 1 % Apply 2 g topically 4 (four) times a day 5/19/19   Issac Avendano MD   diphenhydrAMINE (BENADRYL) 25 mg capsule Take by mouth as needed for itching      Historical Provider, MD   dorzolamide-timolol (COSOPT) 22 3-6 8 MG/ML ophthalmic solution instill 1 drop into both eyes twice a day 2/1/19   Historical Provider, MD   Ferric Citrate (AURYXIA) 1  MG(Fe) TABS Take by mouth 3/29/17   Historical Provider, MD   gabapentin (NEURONTIN) 100 mg capsule Take 100 mg by mouth 3 (three) times a day      Historical Provider, MD   insulin glargine (LANTUS) 100 units/mL subcutaneous injection Inject 28 Units under the skin daily at bedtime    Historical Provider, MD Breanna De La Cruz 15 GM/60ML suspension Take by mouth Take as directed 12/10/18   Historical Provider, MD   latanoprost (XALATAN) 0 005 % ophthalmic solution Administer 1 drop to both eyes daily at bedtime    Historical Provider, MD   levothyroxine 50 mcg tablet Take 50 mcg by mouth daily    Historical Provider, MD Barbara Hernandez topically Apply to access 1 hour prior to HD    Historical Provider, MD   loratadine (CLARITIN) 10 mg tablet Take 10 mg by mouth daily    Historical Provider, MD   losartan (COZAAR) 25 mg tablet Take 1 tablet (25 mg total) by mouth daily Take on NON-Dialysis Days 5/23/18   Jhonathan Richter PA-C   Melatonin 3 MG CAPS Take 10 mg by mouth daily at bedtime      Historical Provider, MD   methazolamide (NEPTAZANE) 25 MG tablet Take 25 mg by mouth 3 (three) times a day      Historical Provider, MD   ondansetron (ZOFRAN) 4 mg tablet Take 1 tablet (4 mg total) by mouth every 8 (eight) hours as needed for nausea or vomiting  Patient not taking: Reported on 5/16/2019 1/22/19   Daron Stout MD   pantoprazole (PROTONIX) 40 mg tablet Take 40 mg by mouth daily    Historical Provider, MD   prednisoLONE acetate (PRED FORTE) 1 % ophthalmic suspension Administer 1 drop to both eyes 4 (four) times a day      Historical Provider, MD   sertraline (ZOLOFT) 50 mg tablet Take 1 tablet (50 mg total) by mouth daily 4/28/19   LOIS Okeefe   TIMOLOL MALEATE OP Apply 1 drop to eye 2 (two) times a day    Historical Provider, MD   torsemide (DEMADEX) 100 mg tablet Take 200 mg by mouth every 12 (twelve) hours 1/2/19   Historical Provider, MD   VIGAMOX 0 5 % ophthalmic solution Administer 1 drop to the right eye 2/23/18   Historical Provider, MD   warfarin (COUMADIN) 2 5 mg tablet Take 12 5 mg by mouth daily Monday through Friday    Historical Provider, MD   warfarin (COUMADIN) 7 5 mg tablet Take 15 mg by mouth daily Saturday and Sunday    Historical Provider, MD     I have reviewed home medications using allscripts  Allergies: Allergies   Allergen Reactions    Iodinated Diagnostic Agents Itching       Social History:     Marital Status: Single     Substance Use History:   Social History     Substance and Sexual Activity   Alcohol Use Not Currently    Alcohol/week: 0 0 standard drinks    Frequency: Never    Drinks per session: Patient refused    Binge frequency: Patient refused     Social History     Tobacco Use   Smoking Status Never Smoker   Smokeless Tobacco Never Used     Social History     Substance and Sexual Activity   Drug Use No       Family History:    non-contributory    Physical Exam:     Vitals:   Blood Pressure: 113/55 (08/26/19 1029)  Pulse: 73 (08/26/19 1029)  Temperature: 97 9 °F (36 6 °C) (08/26/19 1029)  Temp Source: Oral (08/26/19 1029)  Respirations: 18 (08/26/19 1029)  Weight - Scale: 125 kg (275 lb 5 7 oz) (08/26/19 1029)  SpO2: 96 % (08/26/19 1029)    Physical Exam   Constitutional: No distress  Eyes: Pupils are equal, round, and reactive to light  Cardiovascular: Normal rate, regular rhythm and normal heart sounds  No murmur heard  Pulmonary/Chest: Effort normal and breath sounds normal  No respiratory distress  She has no wheezes  She has no rales  Abdominal: Soft  Bowel sounds are normal  She exhibits no distension  There is no tenderness  Musculoskeletal: She exhibits no edema  Left lower extremity:  Left heel ulcer noted with active pus like discharge, significant tenderness in surrounding area, mild erythema present  Another small ulcer without any active signs of infection or drainage also noted on left toe  Skin: Skin is warm  Additional Data:     Lab Results: I have personally reviewed pertinent reports        Results from last 7 days   Lab Units 08/26/19  1154   WBC Thousand/uL 5 77   HEMOGLOBIN g/dL 8 3*   HEMATOCRIT % 25 3*   PLATELETS Thousands/uL 136*   NEUTROS PCT % 74   LYMPHS PCT % 15   MONOS PCT % 8   EOS PCT % 2     Results from last 7 days   Lab Units 08/26/19  1154   SODIUM mmol/L 132*   POTASSIUM mmol/L 5 8*   CHLORIDE mmol/L 97*   CO2 mmol/L 27   BUN mg/dL 58*   CREATININE mg/dL 9 26*   ANION GAP mmol/L 8   CALCIUM mg/dL 7 9*   GLUCOSE RANDOM mg/dL 303*     Results from last 7 days   Lab Units 08/26/19  1309   INR  7 06*                   Imaging: I have personally reviewed pertinent reports  XR ankle 3+ views LEFT   Final Result by Emerita Dooley DO (08/26 1300)      No acute osseous abnormality  Workstation performed: [id number]             [employer] / [employer] Records Reviewed: Yes     ** Please Note: This note has been constructed using a voice recognition system   **

## 2019-08-26 NOTE — ASSESSMENT & PLAN NOTE
Lab Results   Component Value Date    HGBA1C 8 9 (H) 04/28/2019       No results for input(s): POCGLU in the last 72 hours  Blood Sugar Average: Last 72 hrs:     Not well controlled as noted by HbA1c  On 28 units Lantus at home  Continue basal insulin  Insulin sliding scale and Accu-Cheks  Obtain repeat HbA1c

## 2019-08-26 NOTE — ASSESSMENT & PLAN NOTE
Potassium 5 8 In hemodialysis patient  Obtain EKG  Monitor on telemetry  Last hemodialysis on Saturday  Nephrology consult

## 2019-08-26 NOTE — ED PROVIDER NOTES
History  Chief Complaint   Patient presents with    Leg Pain     Patient presents to the E R  with leg and foot pain  53-year-old female with history of diabetes and ESRD on dialysis presenting to the emergency department with pain in her left heel for the last 3-4 days with a draining wound  Patient reports mild swelling of the heel as well and difficulty walking secondary to this pain  She also notes that she is now having pain in her right knee because she has been favoring the right leg  She notes swelling in the evenings after she has been walking all day but denies any redness or difficulty with range of motion  Denies any recent fevers or chills or systemic symptoms  Denies any swelling of the calves or thighs  No recent antibiotics  Falls with [facility], however her last exam was several months ago  Prior to Admission Medications   Prescriptions Last Dose Informant Patient Reported? Taking? ALPRAZolam (XANAX) 0 25 mg tablet Past Week at Unknown time Self Yes Yes   Sig: Take 0 25 mg by mouth 2 (two) times a day as needed for anxiety   B Complex-C-Folic Acid (TRIPHROCAPS) 1 MG CAPS 8/25/2019 at Unknown time Self No Yes   Sig: Take 1 capsule (1 mg total) by mouth daily   CALCIUM CARBONATE PO 8/25/2019 at Unknown time Self Yes Yes   Sig: Take 1,000 mg by mouth daily     Ferric Citrate (AURYXIA) 1  MG(Fe) TABS 8/25/2019 at Unknown time Self Yes Yes   Sig: Take by mouth   KIONEX 15 GM/60ML suspension Past Week at Unknown time Self Yes Yes   Sig: Take by mouth Take as directed   LIDOCAINE EX Past Week at Unknown time Self Yes Yes   Sig: Apply topically Apply to access 1 hour prior to HD   Melatonin 3 MG CAPS Not Taking at Unknown time Self Yes No   Sig: Take 10 mg by mouth daily at bedtime     TIMOLOL MALEATE OP 8/25/2019 at Unknown time Self Yes Yes   Sig: Apply 1 drop to eye 2 (two) times a day   VIGAMOX 0 5 % ophthalmic solution 8/25/2019 at Unknown time Self Yes Yes   Sig: Administer 1 drop to the right eye   atorvastatin (LIPITOR) 20 mg tablet 8/25/2019 at Unknown time Self Yes Yes   Sig: Take 20 mg by mouth every evening    atropine (ISOPTO ATROPINE) 1 % ophthalmic solution Past Week at Unknown time Self Yes Yes   brimonidine (ALPHAGAN P) 0 15 % ophthalmic solution 8/25/2019 at Unknown time Self Yes Yes   Sig: Administer 1 drop to both eyes 2 (two) times a day    butalbital-acetaminophen-caffeine (FIORICET,ESGIC) -40 mg per tablet Not Taking at Unknown time  No No   Sig: Take 1 tablet by mouth daily as needed for headaches   Patient not taking: Reported on 8/26/2019   carvedilol (COREG) 25 mg tablet 8/25/2019 at Unknown time Self Yes Yes   Sig: Take 25 mg by mouth 2 (two) times a day with meals     cholecalciferol (VITAMIN D3) 1,000 units tablet 8/25/2019 at Unknown time Self Yes Yes   Sig: Take 2,000 Units by mouth daily   cinacalcet (SENSIPAR) 30 mg tablet 8/25/2019 at Unknown time Self Yes Yes   Sig: Take 30 mg by mouth daily   cloNIDine (CATAPRES) 0 1 mg tablet 8/25/2019 at Unknown time Self Yes Yes   Sig: Take 0 1 mg by mouth 2 (two) times a day   cyclobenzaprine (FLEXERIL) 10 mg tablet 8/25/2019 at Unknown time Self Yes Yes   Sig: Take 10 mg by mouth daily at bedtime   diclofenac sodium (VOLTAREN) 1 % 8/25/2019 at Unknown time  No Yes   Sig: Apply 2 g topically 4 (four) times a day   diphenhydrAMINE (BENADRYL) 25 mg capsule Past Week at Unknown time Self Yes Yes   Sig: Take by mouth as needed for itching     dorzolamide-timolol (COSOPT) 22 3-6 8 MG/ML ophthalmic solution 8/25/2019 at Unknown time Self Yes Yes   Sig: instill 1 drop into both eyes twice a day   gabapentin (NEURONTIN) 100 mg capsule 8/25/2019 at Unknown time Self Yes Yes   Sig: Take 100 mg by mouth 3 (three) times a day     insulin glargine (LANTUS) 100 units/mL subcutaneous injection 8/26/2019 at Unknown time Self Yes Yes   Sig: Inject 28 Units under the skin daily at bedtime   latanoprost (XALATAN) 0 005 % ophthalmic solution 8/25/2019 at Unknown time Self Yes Yes   Sig: Administer 1 drop to both eyes daily at bedtime   levothyroxine 50 mcg tablet 8/26/2019 at Unknown time Self Yes Yes   Sig: Take 50 mcg by mouth daily   loratadine (CLARITIN) 10 mg tablet Past Week at Unknown time  Yes Yes   Sig: Take 10 mg by mouth daily   losartan (COZAAR) 25 mg tablet 8/25/2019 at Unknown time Self No Yes   Sig: Take 1 tablet (25 mg total) by mouth daily Take on NON-Dialysis Days   methazolamide (NEPTAZANE) 25 MG tablet 8/25/2019 at Unknown time Self Yes Yes   Sig: Take 25 mg by mouth 3 (three) times a day     ondansetron (ZOFRAN) 4 mg tablet Past Week at Unknown time Self No Yes   Sig: Take 1 tablet (4 mg total) by mouth every 8 (eight) hours as needed for nausea or vomiting   pantoprazole (PROTONIX) 40 mg tablet 8/25/2019 at Unknown time  Yes Yes   Sig: Take 40 mg by mouth daily   prednisoLONE acetate (PRED FORTE) 1 % ophthalmic suspension 8/25/2019 at Unknown time Self Yes Yes   Sig: Administer 1 drop to both eyes 4 (four) times a day     sertraline (ZOLOFT) 50 mg tablet 8/25/2019 at Unknown time Self No Yes   Sig: Take 1 tablet (50 mg total) by mouth daily   torsemide (DEMADEX) 100 mg tablet 8/25/2019 at Unknown time Self Yes Yes   Sig: Take 200 mg by mouth every 12 (twelve) hours   warfarin (COUMADIN) 2 5 mg tablet Past Week at Unknown time Self Yes Yes   Sig: Take 12 5 mg by mouth daily Monday through Friday   warfarin (COUMADIN) 7 5 mg tablet 8/25/2019 at Unknown time Self Yes Yes   Sig: Take 15 mg by mouth daily Saturday and Sunday      Facility-Administered Medications: None       Past Medical History:   Diagnosis Date    Abnormal uterine bleeding (AUB)     Anxiety     Diabetes mellitus ([facility] )     Disease of thyroid gland     DVT (deep venous thrombosis) (HCC)     End stage kidney disease ([facility] )     Foot ulcer due to secondary DM ([facility] )     Hypertension     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[facility] )     right eye    Morbid obesity ([facility] )     Panic attacks     Reflux esophagitis     Thrombophlebitis of saphenous vein     Warfarin anticoagulation        Past Surgical History:   Procedure Laterality Date    ARTERIOVENOUS GRAFT PLACEMENT      CERVICAL BIOPSY  W/ LOOP ELECTRODE EXCISION       SECTION      DIALYSIS FISTULA CREATION      DILATION AND CURETTAGE OF UTERUS      ENDOMETRIAL ABLATION W/ NOVASURE      EYE SURGERY      HYSTERECTOMY      @ age 57 (complete)    OOPHORECTOMY Bilateral     @ age 57   Areta Emmer PERICARDIUM SURGERY      FL COLONOSCOPY FLX DX W/COLLJ 1978 PFRMD N/A 3/13/2019    Procedure: COLONOSCOPY;  Surgeon: Castillo Whitfield MD;  [facility]: [facility]; Service: Gastroenterology    FL ESOPHAGOGASTRODUODENOSCOPY TRANSORAL DIAGNOSTIC N/A 3/13/2019    Procedure: ESOPHAGOGASTRODUODENOSCOPY (EGD); Surgeon: Castillo Whitfield MD;  [facility]: [facility]; Service: Gastroenterology    FL LAPAROSCOPY W TOT HYSTERECT UTERUS 250 GRAM OR LESS N/A 2016    Procedure: HYSTERECTOMY LAPAROSCOPIC TOTAL [facility]), with bilateral salpingectomy;  Surgeon: Nehal Patel DO;  [facility];  Service: Gynecology    THROMBECTOMY / ARTERIOVENOUS GRAFT REVISION      TOE SURGERY Right     removal of the 4th toe       Family History   Problem Relation Age of Onset    Heart disease Family     Diabetes Family     Heart disease Mother     Cancer Brother         neck    Throat cancer Brother     Ovarian cancer Maternal Aunt 36     I have reviewed and agree with the history as documented  Social History     Tobacco Use    Smoking status: Never Smoker    Smokeless tobacco: Never Used   Substance Use Topics    Alcohol use: Not Currently     Alcohol/week: 0 0 standard drinks     Frequency: Never     Drinks per session: Patient refused     Binge frequency: Patient refused    Drug use: No        Review of Systems   Constitutional: Negative for chills and fever     HENT: Negative for congestion and sore throat  Eyes: Negative for visual disturbance  Respiratory: Negative for cough and shortness of breath  Cardiovascular: Negative for chest pain and palpitations  Gastrointestinal: Negative for abdominal pain, diarrhea, nausea and vomiting  Genitourinary: Negative for difficulty urinating and dysuria  Musculoskeletal: Positive for arthralgias and joint swelling  Negative for myalgias  Skin: Positive for rash and wound  Neurological: Negative for weakness, light-headedness, numbness and headaches  Physical Exam  ED Triage Vitals   Temperature Pulse Respirations Blood Pressure SpO2   08/26/19 1029 08/26/19 1029 08/26/19 1029 08/26/19 1029 08/26/19 1029   97 9 °F (36 6 °C) 73 18 113/55 96 %      Temp Source Heart Rate Source Patient Position - Orthostatic VS BP Location FiO2 (%)   08/26/19 1029 08/26/19 1402 08/26/19 1402 08/26/19 1029 --   Oral Monitor Lying Right arm       Pain Score       08/26/19 1029       Worst Possible Pain             Orthostatic Vital Signs  Vitals:    08/26/19 1029 08/26/19 1402 08/26/19 1504   BP: 113/55 110/52 136/55   Pulse: 73 76 74   Patient Position - Orthostatic VS:  Lying Sitting       Physical Exam   Constitutional: She is oriented to person, place, and time  She appears well-developed and well-nourished  No distress  HENT:   Head: Normocephalic and atraumatic  Eyes: Pupils are equal, round, and reactive to light  EOM are normal    Neck: Normal range of motion  Neck supple  Cardiovascular: Normal rate, regular rhythm, normal heart sounds and intact distal pulses  A palpable thrill over the fistula on the left arm  Pulmonary/Chest: Effort normal and breath sounds normal  No respiratory distress  Abdominal: Soft  Bowel sounds are normal  There is no tenderness  Musculoskeletal: Normal range of motion  She exhibits edema and tenderness  No effusion or edema noted around the right knee    There is mild tenderness to palpation over the anterior lateral aspect of the knee  Normal anterior and posterior drawer as well as no pain induced with varus or valgus stress  There is a in lightly erythematous region over the calcaneus on the left heel with a callus and a linear wound with serous drainage  The patient is extremely tender over this area and I am unable to palpate to attempt to check for any purulent drainage  Notably there are also other chronic appearing ulcers and calluses on the left foot  Neurological: She is alert and oriented to person, place, and time  Skin: Skin is warm and dry  Capillary refill takes less than 2 seconds  There is erythema  Psychiatric: She has a normal mood and affect  Nursing note and vitals reviewed        ED Medications  Medications   ALPRAZolam (XANAX) tablet 0 25 mg (has no administration in time range)   atorvastatin (LIPITOR) tablet 20 mg (has no administration in time range)   b complex-vitamin C-folic acid (NEPHROCAPS) capsule 1 capsule (has no administration in time range)   brimonidine (ALPHAGAN P) 0 15 % ophthalmic solution 1 drop (has no administration in time range)   carvedilol (COREG) tablet 25 mg (has no administration in time range)   cholecalciferol (VITAMIN D3) tablet 2,000 Units (has no administration in time range)   cinacalcet (SENSIPAR) tablet 30 mg (has no administration in time range)   cloNIDine (CATAPRES) tablet 0 1 mg (has no administration in time range)   cyclobenzaprine (FLEXERIL) tablet 10 mg (has no administration in time range)   gabapentin (NEURONTIN) capsule 100 mg (has no administration in time range)   latanoprost (XALATAN) 0 005 % ophthalmic solution 1 drop (has no administration in time range)   levothyroxine tablet 50 mcg (has no administration in time range)   losartan (COZAAR) tablet 25 mg (has no administration in time range)   melatonin tablet 3 mg (has no administration in time range)   pantoprazole (PROTONIX) EC tablet 40 mg (has no administration in time range)   prednisoLONE acetate (PRED FORTE) 1 % ophthalmic suspension 1 drop (has no administration in time range)   sertraline (ZOLOFT) tablet 50 mg (has no administration in time range)   timolol (TIMOPTIC) 0 25 % ophthalmic solution 1 drop (has no administration in time range)   torsemide (DEMADEX) tablet 200 mg (has no administration in time range)   acetaminophen (TYLENOL) tablet 650 mg (has no administration in time range)   oxyCODONE (ROXICODONE) IR tablet 5 mg (has no administration in time range)   oxyCODONE (ROXICODONE) immediate release tablet 10 mg (10 mg Oral Given 8/26/19 1423)   insulin glargine (LANTUS) subcutaneous injection 28 Units 0 28 mL (has no administration in time range)   insulin lispro (HumaLOG) 100 units/mL subcutaneous injection 1-5 Units (has no administration in time range)   insulin lispro (HumaLOG) 100 units/mL subcutaneous injection 1-5 Units (has no administration in time range)   metroNIDAZOLE (FLAGYL) IVPB (premix) 500 mg (has no administration in time range)   cefepime (MAXIPIME) 1,000 mg in dextrose 5 % 50 mL IVPB (has no administration in time range)   doxercalciferol (HECTOROL) injection 1 mcg (has no administration in time range)   vancomycin (VANCOCIN) IVPB (premix) 750 mg (has no administration in time range)   loratadine (CLARITIN) tablet 10 mg (has no administration in time range)   oxyCODONE (ROXICODONE) IR tablet 5 mg (5 mg Oral Given 8/26/19 1106)   cefepime (MAXIPIME) 2 g/50 mL dextrose IVPB (0 mg Intravenous Stopped 8/26/19 1300)   vancomycin (VANCOCIN) 1,750 mg in sodium chloride 0 9 % 500 mL IVPB (1,750 mg Intravenous New Bag 8/26/19 1313)   insulin regular (HumuLIN R,NovoLIN R) injection 10 Units (10 Units Intravenous Given 8/26/19 1419)       Diagnostic Studies  Results Reviewed     Procedure Component Value Units Date/Time    Sedimentation rate, automated [[id number]]  (Abnormal) Collected:  08/26/19 1309    Lab Status:  Final result Specimen:  Blood from Arm, Right Updated:  08/26/19 1432     Sed Rate 81 mm/hour     Fingerstick Glucose (POCT) [[id number]]  (Abnormal) Collected:  08/26/19 1357    Lab Status:  Final result Updated:  08/26/19 1358     POC Glucose 407 mg/dl     Protime-INR [[id number]]  (Abnormal) Collected:  08/26/19 1309    Lab Status:  Final result Specimen:  Blood from Arm, Right Updated:  08/26/19 1353     Protime 60 4 seconds      INR 7 06    Wound culture and Gram stain [[id number]]     Lab Status:  No result Specimen:  Wound from Leg, Left     Blood culture #1 [[id number]] Collected:  08/26/19 1309    Lab Status: In process Specimen:  Blood from Arm, Right Updated:  08/26/19 1316    Blood culture #2 [[id number]] Collected:  08/26/19 1309    Lab Status:   In process Specimen:  Blood from Arm, Right Updated:  08/26/19 3834    Basic metabolic panel [[id number]]  (Abnormal) Collected:  08/26/19 1154    Lab Status:  Final result Specimen:  Blood from Arm, Right Updated:  08/26/19 1224     Sodium 132 mmol/L      Potassium 5 8 mmol/L      Chloride 97 mmol/L      CO2 27 mmol/L      ANION GAP 8 mmol/L      BUN 58 mg/dL      Creatinine 9 26 mg/dL      Glucose 303 mg/dL      Calcium 7 9 mg/dL      eGFR 4 ml/min/1 73sq m     Narrative:       [place] guidelines for Chronic Kidney Disease (CKD):     Stage 1 with normal or high GFR (GFR > 90 mL/min/1 73 square meters)    Stage 2 Mild CKD (GFR = 60-89 mL/min/1 73 square meters)    Stage 3A Moderate CKD (GFR = 45-59 mL/min/1 73 square meters)    Stage 3B Moderate CKD (GFR = 30-44 mL/min/1 73 square meters)    Stage 4 Severe CKD (GFR = 15-29 mL/min/1 73 square meters)    Stage 5 End Stage CKD (GFR <15 mL/min/1 73 square meters)  Note: GFR calculation is accurate only with a steady state creatinine    C-reactive protein [[id number]]  (Abnormal) Collected:  08/26/19 1154    Lab Status:  Final result Specimen:  Blood from Arm, Right Updated:  08/26/19 1224     CRP 27 2 mg/L     CBC and differential [[id number]]  (Abnormal) Collected:  08/26/19 1154    Lab Status:  Final result Specimen:  Blood from Arm, Right Updated:  08/26/19 1204     WBC 5 77 Thousand/uL      RBC 2 70 Million/uL      Hemoglobin 8 3 g/dL      Hematocrit 25 3 %      MCV 94 fL      MCH 30 7 pg      MCHC 32 8 g/dL      RDW 14 1 %      MPV 10 7 fL      Platelets 570 Thousands/uL      nRBC 0 /100 WBCs      Neutrophils Relative 74 %      Immat GRANS % 1 %      Lymphocytes Relative 15 %      Monocytes Relative 8 %      Eosinophils Relative 2 %      Basophils Relative 0 %      Neutrophils Absolute 4 27 Thousands/µL      Immature Grans Absolute 0 03 Thousand/uL      Lymphocytes Absolute 0 84 Thousands/µL      Monocytes Absolute 0 48 Thousand/µL      Eosinophils Absolute 0 13 Thousand/µL      Basophils Absolute 0 02 Thousands/µL                  XR ankle 3+ views LEFT   Final Result by Rommel Vernon DO (08/26 1300)      No acute osseous abnormality  Workstation performed: [id number]               Procedures  Procedures        ED Course  ED Course as of Aug 26 1632   Mon Aug 26, 2019   1232 Spoke with podiatry, agree with medicine admission  Suggest adding flagyl to abx  Will likely need debridement  MDM  Number of Diagnoses or Management Options  Diagnosis management comments: 61-year-old female presenting emergency department for evaluation of pain left heel and right knee  The right knee appears to be arthritic pain related to overuse because she is favoring that leg over the left foot  The left heel appears to have acute infection around the heel  The with the patient's significant history will start her on cefepime and vancomycin, do a CBC, BMP, CRP, and an x-ray of the right heel to evaluate for possible osteomyelitis    Will discuss with podiatry prior to disposition      Disposition  Final diagnoses:   Diabetic ulcer of left ankle ([address] )   Right knee pain     Time reflects when diagnosis was documented in both MDM as applicable and the Disposition within this note     Time User Action Codes Description Comment    8/26/2019 12:51 PM [place] Core Add [N18 6,  Z99 2] ESRD on hemodialysis ([place] [address] )     8/26/2019 12:51 PM [place] Core Modify [N18 6,  Z99 2] ESRD on hemodialysis ([place] [address] )     8/26/2019 12:51 PM [place] Core Modify [N18 6,  Z99 2] ESRD on hemodialysis ([place] [address] )     8/26/2019 12:51 PM [place] Core Add [E87 5] Hyperkalemia     8/26/2019  4:32 PM Harolyn Sample [E00 582,  O32 065] Diabetic ulcer of left ankle ([place] [address] )     8/26/2019  4:32 PM Dorma Na Add [I31 167] Right knee pain       ED Disposition     ED Disposition Condition Date/Time Comment    Admit Stable Mon Aug 26, 2019 12:48 PM Case was discussed with HARISH and the patient's admission status was agreed to be Admission Status: inpatient status to the service of Dr Lin Kelly   Follow-up Information    None         Current Discharge Medication List      CONTINUE these medications which have NOT CHANGED    Details   ALPRAZolam (XANAX) 0 25 mg tablet Take 0 25 mg by mouth 2 (two) times a day as needed for anxiety      atorvastatin (LIPITOR) 20 mg tablet Take 20 mg by mouth every evening   Refills: 0      atropine (ISOPTO ATROPINE) 1 % ophthalmic solution Refills: 0      B Complex-C-Folic Acid (TRIPHROCAPS) 1 MG CAPS Take 1 capsule (1 mg total) by mouth daily  Qty: 30 capsule, Refills: 0    Associated Diagnoses: ESRD on hemodialysis (HCC)      brimonidine (ALPHAGAN P) 0 15 % ophthalmic solution Administer 1 drop to both eyes 2 (two) times a day       CALCIUM CARBONATE PO Take 1,000 mg by mouth daily        carvedilol (COREG) 25 mg tablet Take 25 mg by mouth 2 (two) times a day with meals        cholecalciferol (VITAMIN D3) 1,000 units tablet Take 2,000 Units by mouth daily      cinacalcet (SENSIPAR) 30 mg tablet Take 30 mg by mouth daily      cloNIDine (CATAPRES) 0 1 mg tablet Take 0 1 mg by mouth 2 (two) times a day cyclobenzaprine (FLEXERIL) 10 mg tablet Take 10 mg by mouth daily at bedtime      diclofenac sodium (VOLTAREN) 1 % Apply 2 g topically 4 (four) times a day  Qty: 200 g, Refills: 0    Associated Diagnoses: Strain of cervical portion of right trapezius muscle; Right knee pain      diphenhydrAMINE (BENADRYL) 25 mg capsule Take by mouth as needed for itching        dorzolamide-timolol (COSOPT) 22 3-6 8 MG/ML ophthalmic solution instill 1 drop into both eyes twice a day  Refills: 0      Ferric Citrate (AURYXIA) 1  MG(Fe) TABS Take by mouth      gabapentin (NEURONTIN) 100 mg capsule Take 100 mg by mouth 3 (three) times a day        insulin glargine (LANTUS) 100 units/mL subcutaneous injection Inject 28 Units under the skin daily at bedtime      KIONEX 15 GM/60ML suspension Take by mouth Take as directed  Refills: 0      latanoprost (XALATAN) 0 005 % ophthalmic solution Administer 1 drop to both eyes daily at bedtime      levothyroxine 50 mcg tablet Take 50 mcg by mouth daily      LIDOCAINE EX Apply topically Apply to access 1 hour prior to HD      loratadine (CLARITIN) 10 mg tablet Take 10 mg by mouth daily      losartan (COZAAR) 25 mg tablet Take 1 tablet (25 mg total) by mouth daily Take on NON-Dialysis Days    Associated Diagnoses: Hypertensive urgency      methazolamide (NEPTAZANE) 25 MG tablet Take 25 mg by mouth 3 (three) times a day        ondansetron (ZOFRAN) 4 mg tablet Take 1 tablet (4 mg total) by mouth every 8 (eight) hours as needed for nausea or vomiting  Qty: 12 tablet, Refills: 0    Associated Diagnoses: Nausea      pantoprazole (PROTONIX) 40 mg tablet Take 40 mg by mouth daily      prednisoLONE acetate (PRED FORTE) 1 % ophthalmic suspension Administer 1 drop to both eyes 4 (four) times a day        sertraline (ZOLOFT) 50 mg tablet Take 1 tablet (50 mg total) by mouth daily  Qty: 30 tablet, Refills: 0    Associated Diagnoses: Anxiety disorder      TIMOLOL MALEATE OP Apply 1 drop to eye 2 (two) times a day      torsemide (DEMADEX) 100 mg tablet Take 200 mg by mouth every 12 (twelve) hours  Refills: 0      VIGAMOX 0 5 % ophthalmic solution Administer 1 drop to the right eye  Refills: 0      !! warfarin (COUMADIN) 2 5 mg tablet Take 12 5 mg by mouth daily Monday through Friday      !! warfarin (COUMADIN) 7 5 mg tablet Take 15 mg by mouth daily Saturday and Sunday      butalbital-acetaminophen-caffeine (FIORICET,ESGIC) -40 mg per tablet Take 1 tablet by mouth daily as needed for headaches  Qty: 10 tablet, Refills: 0    Associated Diagnoses: Headache      Melatonin 3 MG CAPS Take 10 mg by mouth daily at bedtime         !! - Potential duplicate medications found  Please discuss with provider  No discharge procedures on file  ED Provider  Attending physically available and evaluated Da Abreu  MANISH managed the patient along with the ED Attending      Electronically Signed by         Vi Hobbs MD  08/26/19 6726

## 2019-08-26 NOTE — ASSESSMENT & PLAN NOTE
· On Coumadin  · INR checked in ER which is elevated at 7   · No active signs of bleeding  · Hemoglobin 8 1 which is her baseline  · Hold Coumadin and trend INR for now  · Will not reverse INR until patient is bleeding or unless any surgical intervention is planned  Mari Bautista

## 2019-08-26 NOTE — CONSULTS
Consultation - Nephrology   Cliff Polanco 48 y o  female MRN: [id number]  Unit/Bed#: ED 21 Encounter: [id number]    ASSESSMENT/PLAN:   1  End-stage renal disease: On hemodialysis Tuesday, Thursday and Saturday at [address]  · Currently appears stable from a dialysis standpoint and next treatment will be tomorrow  · Access:  Left upper extremity AV fistula with good bruit and thrill  · Complains of prolonged bleeding after HD but has INR of 7  If no improvement with normalizing INR would check fistulogram (can be done outpatient)  2  Left heel wound:  Currently on vancomycin and cefepime  Podiatry evaluation pending  · Blood cultures pending   3  Hyperkalemia:  Potassium 5 8 on admission due to ESRD and hyperglycemia  · Blood sugar currently 407 and will give 10 units normal insulin IV x1  · Low-potassium diet  · Dialysis tomorrow  4  Mineral bone disease of CKD:  Continue Hectorol 1 mcg Q HD, Sensipar 30 mg daily and Renvela 3 tablets t i d  With meals  5  Anemia of CKD:  Hemoglobin 8 3 today  · Not on Epogen due to history of PE/DVT  · Hold Venofer due to infection  6  Hyponatremia:  Sodium corrects to normal for hyperglycemia  7  Hypertension: BP on the lower side and hers is usually high  · Outpatient meds per HD orders:  Carvedilol 12 5 mg b i d , clonidine 0 1 mg daily, losartan 25 mg daily, torsemide mg b i d  HISTORY OF PRESENT ILLNESS:  Requesting Physician: Leobardo Garnett DO  Reason for Consult:  End-stage renal disease on hemodialysis    Cliff Polanco is a 48y o  year old female who was admitted to [facility] with a foot wound  Patient states that she cut her left heel a few days ago and has been having worsening pain and difficulty walking due to the wound on her foot  She complains of having low-grade temperatures of 99 7°  She is a diabetic and follows with podiatry and was concerned that she could not walk due to the pain so she came to the emergency room    She has a history of end-stage renal disease and is on hemodialysis so Nephrology was consulted  Patient had dialysis on Saturday but cut her treatment short and only received 3 hours due to the pain in her foot  She denies any current chest pain, shortness of breath, nausea, vomiting or diarrhea  She has been eating and drinking normally  No issues with her dialysis treatment on Saturday other than foot pain  PAST MEDICAL HISTORY:  Past Medical History:   Diagnosis Date    Abnormal uterine bleeding (AUB)     Anxiety     Diabetes mellitus [facility]     Disease of thyroid gland     DVT (deep venous thrombosis) (HCC)     End stage kidney disease (HCC)     Foot ulcer due to secondary DM ([facility] )     Hypertension     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[facility] )     right eye    Morbid obesity (HCC)     Panic attacks     Reflux esophagitis     Thrombophlebitis of saphenous vein     Warfarin anticoagulation        PAST SURGICAL HISTORY:  Past Surgical History:   Procedure Laterality Date    ARTERIOVENOUS GRAFT PLACEMENT      CERVICAL BIOPSY  W/ LOOP ELECTRODE EXCISION       SECTION      DIALYSIS FISTULA CREATION      DILATION AND CURETTAGE OF UTERUS      ENDOMETRIAL ABLATION W/ NOVASURE      EYE SURGERY      HYSTERECTOMY      @ age 57 (complete)    OOPHORECTOMY Bilateral     @ age 57   [address]LAND[address] Castillo  PFRMD N/A 3/13/2019    Procedure: COLONOSCOPY;  Surgeon: Azael Castillo MD;  Location: [facility]; Service: Gastroenterology    MA ESOPHAGOGASTRODUODENOSCOPY TRANSORAL DIAGNOSTIC N/A 3/13/2019    Procedure: ESOPHAGOGASTRODUODENOSCOPY (EGD); Surgeon: Azael Castillo MD;  Location: [facility];   Service: Gastroenterology    MA LAPAROSCOPY W TOT HYSTERECT UTERUS 250 GRAM OR LESS N/A 2016    Procedure: HYSTERECTOMY LAPAROSCOPIC TOTAL [facility]), with bilateral salpingectomy;  Surgeon: Shane Elmore DO;  Location[facility];  Service: Gynecology   [place] THROMBECTOMY / ARTERIOVENOUS GRAFT REVISION      TOE SURGERY Right     removal of the 4th toe       ALLERGIES:  Allergies   Allergen Reactions    Iodinated Diagnostic Agents Itching       SOCIAL HISTORY:  Social History     Substance and Sexual Activity   Alcohol Use Not Currently    Alcohol/week: 0 0 standard drinks    Frequency: Never    Drinks per session: Patient refused    Binge frequency: Patient refused     Social History     Substance and Sexual Activity   Drug Use No     Social History     Tobacco Use   Smoking Status Never Smoker   Smokeless Tobacco Never Used       FAMILY HISTORY:  Family History   Problem Relation Age of Onset    Heart disease Family     Diabetes Family     Heart disease Mother     Cancer Brother         neck    Throat cancer Brother     Ovarian cancer Maternal Aunt 40       MEDICATIONS:  Scheduled Meds:  Current Facility-Administered Medications:  [START ON 8/27/2019] insulin glargine 28 Units Subcutaneous QAM Chiquita Cleaning MD    insulin lispro 1-5 Units Subcutaneous TID AC Chiquita Cleaning MD    insulin lispro 1-5 Units Subcutaneous HS Chiquita Cleaning MD    insulin regular 10 Units Intravenous Once Octavio Sotelo PA-C    metroNIDAZOLE 500 mg Intravenous Once Bhavesh Claudio MD    oxyCODONE 10 mg Oral Q6H PRN Chiquita Cleaning MD    oxyCODONE 5 mg Oral Q6H PRN Chiquita Cleaning MD    vancomycin 20 mg/kg (Adjusted) Intravenous Once Bhavesh Claudio MD Last Rate: 1,750 mg (08/26/19 1313)       PRN Meds: oxyCODONE    oxyCODONE    Continuous Infusions:     REVIEW OF SYSTEMS:  A complete review of systems was done  Pertinent positives and negatives noted in the HPI but otherwise the review of systems is negative      PHYSICAL EXAM:  Current Weight: Weight - Scale: 125 kg (275 lb 5 7 oz)  First Weight: Weight - Scale: 125 kg (275 lb 5 7 oz)  Vitals:    08/26/19 1029   BP: 113/55   Pulse: 73   Resp: 18   Temp: 97 9 °F (36 6 °C)   SpO2: 96%     No intake or output data in the 24 hours ending 08/26/19 1402  General:  No acute distress  Skin:  No rash  Eyes:  Sclerae anicteric  ENT:  Moist mucous membranes  Neck:  Supple, symmetric  Chest:  Clear to auscultation bilaterally   CVS:  Regular Rate and rhythm  Abdomen:  Soft, nontender, nondistended  Extremities:  Trace edema  Neuro:  Awake and alert  Psych:  Appropriate affect    Lab Results:   Results from last 7 days   Lab Units 08/26/19  1154   WBC Thousand/uL 5 77   HEMOGLOBIN g/dL 8 3*   HEMATOCRIT % 25 3*   PLATELETS Thousands/uL 136*   SODIUM mmol/L 132*   POTASSIUM mmol/L 5 8*   CHLORIDE mmol/L 97*   CO2 mmol/L 27   BUN mg/dL 58*   CREATININE mg/dL 9 26*   CALCIUM mg/dL 7 9*       Radiology Results:   XR ankle 3+ views LEFT   Final Result by Rosanna Olvera DO (08/26 1300)      No acute osseous abnormality              Workstation performed: [id number]

## 2019-08-26 NOTE — ED ATTENDING ATTESTATION
Rg Hunter DO, saw and evaluated the patient  I have discussed the patient with the resident/non-physician practitioner and agree with the resident's/non-physician practitioner's findings, Plan of Care, and MDM as documented in the resident's/non-physician practitioner's note, except where noted  All available labs and Radiology studies were reviewed  I was present for key portions of any procedure(s) performed by the resident/non-physician practitioner and I was immediately available to provide assistance  At this point I agree with the current assessment done in the Emergency Department  I have conducted an independent evaluation of this patient a history and physical is as follows:      48 yof with left heel pain for 3-4 days, +wound with drainage  Past Medical History:   Diagnosis Date    Abnormal uterine bleeding (AUB)     Anxiety     Diabetes mellitus ([facility] )     Disease of thyroid gland     DVT (deep venous thrombosis) (HCC)     End stage kidney disease (HCC)     Foot ulcer due to secondary DM ([facility] [facility] )     Hypertension     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[facility])     right eye    Morbid obesity ([facility])     Panic attacks     Reflux esophagitis     Thrombophlebitis of saphenous vein     Warfarin anticoagulation      /55 (BP Location: Right arm)   Pulse 73   Temp 97 9 °F (36 6 °C) (Oral)   Resp 18   Wt 125 kg (275 lb 5 7 oz)   LMP 02/12/2016   SpO2 96%   BMI 44 44 kg/m²   Uncomfortable, NAD, A&Ox4, CTA, RRR, r foot with ulceration to right posterior calcaneous with drainage and TTP    Infectious eval, CRP for consideration of osteo, vanc and cefepime, consult podiatry and likely admit                     Critical Care Time  Procedures

## 2019-08-26 NOTE — PROGRESS NOTES
Vancomycin Assessment    Jose Conodn is a 48 y o  female who is currently ordered vancomycin 1750 mg once for skin-soft tissue infection     Relevant clinical data and objective history reviewed:  Creatinine   Date Value Ref Range Status   08/26/2019 9 26 (H) 0 60 - 1 30 mg/dL Final     Comment:     Standardized to IDMS reference method   05/18/2019 8 75 (H) 0 60 - 1 30 mg/dL Final     Comment:     Standardized to IDMS reference method   05/17/2019 6 98 (H) 0 60 - 1 30 mg/dL Final     Comment:     Standardized to IDMS reference method   10/11/2015 6 68 (H) 0 60 - 1 30 mg/dL Final     Comment:     Standardized to IDMS reference method   10/08/2015 9 09 (H) 0 60 - 1 30 mg/dL Final     Comment:     Standardized to IDMS reference method   10/07/2015 7 06 (H) 0 60 - 1 30 mg/dL Final     Comment:     Standardized to IDMS reference method     VANCOMYCIN RANDOM   Date Value Ref Range Status   05/26/2014 30 1 mcg/mL Final     Comment:     The above 1 analytes were performed by Larry  [address] [place] [address]       Vancomycin Rm   Date Value Ref Range Status   08/10/2017 20 0 ug/mL Final     /52 (BP Location: Right arm)   Pulse 76   Temp 97 9 °F (36 6 °C) (Oral)   Resp 18   Wt 125 kg (275 lb 5 7 oz)   LMP 02/12/2016   SpO2 96%   BMI 44 44 kg/m²   No intake/output data recorded    Lab Results   Component Value Date/Time    BUN 58 (H) 08/26/2019 11:54 AM    BUN 64 (H) 10/11/2015 06:08 AM    WBC 5 77 08/26/2019 11:54 AM    WBC 8 45 10/11/2015 06:14 AM    HGB 8 3 (L) 08/26/2019 11:54 AM    HGB 8 7 (L) 10/11/2015 06:14 AM    HCT 25 3 (L) 08/26/2019 11:54 AM    HCT 26 3 (L) 10/11/2015 06:14 AM    MCV 94 08/26/2019 11:54 AM    MCV 91 10/11/2015 06:14 AM     (L) 08/26/2019 11:54 AM     10/11/2015 06:14 AM     Temp Readings from Last 3 Encounters:   08/26/19 97 9 °F (36 6 °C) (Oral)   05/19/19 97 5 °F (36 4 °C)   05/01/19 (!) 96 5 °F (35 8 °C) (Tympanic)     Vancomycin Days of Therapy: 1    Assessment/Plan  The patient is currently on vancomycin utilizing pulse dosing  Baseline risks associated with therapy include: pre-existing renal impairment and dehydration  The patient received 1750 mg once today and is due for HD tomorrow (T/R/Sa schedule)  Will obtain vancomycin random level prior to HD tomorrow, would re-dose patient with 10 mg/kg based on AdjBW (~750 mg) when pre-HD level is < 25 mcg/mL - correlates with a goal trough of 15-20 (appropriate for most indications) mcg/mL  Pharmacy will continue to follow closely for s/sx of nephrotoxicity, infusion reactions and appropriateness of therapy  BMP and CBC will be ordered per protocol  Pharmacy will continue to follow the patients culture results and clinical progress daily      Rafi King, BettyD

## 2019-08-26 NOTE — ASSESSMENT & PLAN NOTE
On Tuesday Thursday Saturday schedule  Last dialysis on Saturday which was cut short due to her foot pain per patient  Potassium mildly elevated at 5 8  Obtain EKG  Nephrology consult

## 2019-08-26 NOTE — ASSESSMENT & PLAN NOTE
· POA  · No fever or leukocytosis  Elevated CRP noted  · X-ray obtain in ER, shows No acute osseous abnormality  · Left heel wound/ ulcer with pus like discharge and significant tenderness  PLAN:  · Started on vancomycin, cefepime and Flagyl in ER, continue same for now  · Podiatry consult  Defer to Podiatry for further imaging including MRI  · Check sed rate  · Check blood cultures  · Check wound culture from the drainage from ulcer  · Consider ID evaluation if needed

## 2019-08-27 ENCOUNTER — APPOINTMENT (INPATIENT)
Dept: RADIOLOGY | Facility: HOSPITAL | Age: 54
DRG: 623 | End: 2019-08-27
Payer: MEDICARE

## 2019-08-27 ENCOUNTER — APPOINTMENT (INPATIENT)
Dept: DIALYSIS | Facility: HOSPITAL | Age: 54
DRG: 623 | End: 2019-08-27
Payer: MEDICARE

## 2019-08-27 LAB
ANION GAP SERPL CALCULATED.3IONS-SCNC: 10 MMOL/L (ref 4–13)
BASOPHILS # BLD AUTO: 0.02 THOUSANDS/ΜL (ref 0–0.1)
BASOPHILS NFR BLD AUTO: 0 % (ref 0–1)
BUN SERPL-MCNC: 88 MG/DL (ref 5–25)
CALCIUM SERPL-MCNC: 7.8 MG/DL (ref 8.3–10.1)
CHLORIDE SERPL-SCNC: 100 MMOL/L (ref 100–108)
CO2 SERPL-SCNC: 26 MMOL/L (ref 21–32)
CREAT SERPL-MCNC: 10.2 MG/DL (ref 0.6–1.3)
EOSINOPHIL # BLD AUTO: 0.21 THOUSAND/ΜL (ref 0–0.61)
EOSINOPHIL NFR BLD AUTO: 4 % (ref 0–6)
ERYTHROCYTE [DISTWIDTH] IN BLOOD BY AUTOMATED COUNT: 13.9 % (ref 11.6–15.1)
EST. AVERAGE GLUCOSE BLD GHB EST-MCNC: 252 MG/DL
GFR SERPL CREATININE-BSD FRML MDRD: 4 ML/MIN/1.73SQ M
GLUCOSE SERPL-MCNC: 181 MG/DL (ref 65–140)
GLUCOSE SERPL-MCNC: 185 MG/DL (ref 65–140)
GLUCOSE SERPL-MCNC: 191 MG/DL (ref 65–140)
GLUCOSE SERPL-MCNC: 218 MG/DL (ref 65–140)
GLUCOSE SERPL-MCNC: 232 MG/DL (ref 65–140)
GLUCOSE SERPL-MCNC: 233 MG/DL (ref 65–140)
HBA1C MFR BLD: 10.4 % (ref 4.2–6.3)
HCT VFR BLD AUTO: 24.4 % (ref 34.8–46.1)
HGB BLD-MCNC: 7.7 G/DL (ref 11.5–15.4)
IMM GRANULOCYTES # BLD AUTO: 0.02 THOUSAND/UL (ref 0–0.2)
IMM GRANULOCYTES NFR BLD AUTO: 0 % (ref 0–2)
INR PPP: 9.36 (ref 0.84–1.19)
LYMPHOCYTES # BLD AUTO: 0.95 THOUSANDS/ΜL (ref 0.6–4.47)
LYMPHOCYTES NFR BLD AUTO: 18 % (ref 14–44)
MCH RBC QN AUTO: 30.4 PG (ref 26.8–34.3)
MCHC RBC AUTO-ENTMCNC: 31.6 G/DL (ref 31.4–37.4)
MCV RBC AUTO: 96 FL (ref 82–98)
MONOCYTES # BLD AUTO: 0.49 THOUSAND/ΜL (ref 0.17–1.22)
MONOCYTES NFR BLD AUTO: 9 % (ref 4–12)
NEUTROPHILS # BLD AUTO: 3.75 THOUSANDS/ΜL (ref 1.85–7.62)
NEUTS SEG NFR BLD AUTO: 69 % (ref 43–75)
NRBC BLD AUTO-RTO: 0 /100 WBCS
PLATELET # BLD AUTO: 130 THOUSANDS/UL (ref 149–390)
PMV BLD AUTO: 11.3 FL (ref 8.9–12.7)
POTASSIUM SERPL-SCNC: 6.3 MMOL/L (ref 3.5–5.3)
PROTHROMBIN TIME: 75.5 SECONDS (ref 11.6–14.5)
RBC # BLD AUTO: 2.53 MILLION/UL (ref 3.81–5.12)
SODIUM SERPL-SCNC: 136 MMOL/L (ref 136–145)
VANCOMYCIN SERPL-MCNC: 21.4 UG/ML
WBC # BLD AUTO: 5.44 THOUSAND/UL (ref 4.31–10.16)

## 2019-08-27 PROCEDURE — 5A1D70Z PERFORMANCE OF URINARY FILTRATION, INTERMITTENT, LESS THAN 6 HOURS PER DAY: ICD-10-PCS | Performed by: INTERNAL MEDICINE

## 2019-08-27 PROCEDURE — 87147 CULTURE TYPE IMMUNOLOGIC: CPT | Performed by: PODIATRIST

## 2019-08-27 PROCEDURE — 80048 BASIC METABOLIC PNL TOTAL CA: CPT | Performed by: INTERNAL MEDICINE

## 2019-08-27 PROCEDURE — 85610 PROTHROMBIN TIME: CPT | Performed by: INTERNAL MEDICINE

## 2019-08-27 PROCEDURE — 73630 X-RAY EXAM OF FOOT: CPT

## 2019-08-27 PROCEDURE — 87186 SC STD MICRODIL/AGAR DIL: CPT | Performed by: PODIATRIST

## 2019-08-27 PROCEDURE — 87205 SMEAR GRAM STAIN: CPT | Performed by: PODIATRIST

## 2019-08-27 PROCEDURE — 83036 HEMOGLOBIN GLYCOSYLATED A1C: CPT | Performed by: INTERNAL MEDICINE

## 2019-08-27 PROCEDURE — 90935 HEMODIALYSIS ONE EVALUATION: CPT | Performed by: INTERNAL MEDICINE

## 2019-08-27 PROCEDURE — 85025 COMPLETE CBC W/AUTO DIFF WBC: CPT | Performed by: INTERNAL MEDICINE

## 2019-08-27 PROCEDURE — 82948 REAGENT STRIP/BLOOD GLUCOSE: CPT

## 2019-08-27 PROCEDURE — 0JBR0ZZ EXCISION OF LEFT FOOT SUBCUTANEOUS TISSUE AND FASCIA, OPEN APPROACH: ICD-10-PCS | Performed by: PODIATRIST

## 2019-08-27 PROCEDURE — 99233 SBSQ HOSP IP/OBS HIGH 50: CPT | Performed by: PHYSICIAN ASSISTANT

## 2019-08-27 PROCEDURE — 87070 CULTURE OTHR SPECIMN AEROBIC: CPT | Performed by: PODIATRIST

## 2019-08-27 PROCEDURE — 80202 ASSAY OF VANCOMYCIN: CPT | Performed by: INTERNAL MEDICINE

## 2019-08-27 RX ORDER — DOXERCALCIFEROL 2 UG/ML
4 INJECTION, SOLUTION INTRAVENOUS ONCE
Status: DISCONTINUED | OUTPATIENT
Start: 2019-08-27 | End: 2019-08-27

## 2019-08-27 RX ORDER — DOXERCALCIFEROL 2 UG/ML
1 INJECTION, SOLUTION INTRAVENOUS
Status: DISCONTINUED | OUTPATIENT
Start: 2019-08-27 | End: 2019-08-31 | Stop reason: HOSPADM

## 2019-08-27 RX ADMIN — GABAPENTIN 100 MG: 100 CAPSULE ORAL at 13:22

## 2019-08-27 RX ADMIN — INSULIN LISPRO 2 UNITS: 100 INJECTION, SOLUTION INTRAVENOUS; SUBCUTANEOUS at 13:25

## 2019-08-27 RX ADMIN — INSULIN LISPRO 4 UNITS: 100 INJECTION, SOLUTION INTRAVENOUS; SUBCUTANEOUS at 17:06

## 2019-08-27 RX ADMIN — PREDNISOLONE ACETATE 1 DROP: 10 SUSPENSION/ DROPS OPHTHALMIC at 21:34

## 2019-08-27 RX ADMIN — PANTOPRAZOLE SODIUM 40 MG: 40 TABLET, DELAYED RELEASE ORAL at 05:55

## 2019-08-27 RX ADMIN — ACETAMINOPHEN 650 MG: 325 TABLET ORAL at 21:51

## 2019-08-27 RX ADMIN — INSULIN LISPRO 1 UNITS: 100 INJECTION, SOLUTION INTRAVENOUS; SUBCUTANEOUS at 17:06

## 2019-08-27 RX ADMIN — CINACALCET HYDROCHLORIDE 30 MG: 30 TABLET, FILM COATED ORAL at 13:26

## 2019-08-27 RX ADMIN — LEVOTHYROXINE SODIUM 50 MCG: 50 TABLET ORAL at 05:55

## 2019-08-27 RX ADMIN — MELATONIN 3 MG: 3 TAB ORAL at 21:34

## 2019-08-27 RX ADMIN — CLONIDINE HYDROCHLORIDE 0.1 MG: 0.1 TABLET ORAL at 17:14

## 2019-08-27 RX ADMIN — OXYCODONE HYDROCHLORIDE 10 MG: 10 TABLET ORAL at 07:39

## 2019-08-27 RX ADMIN — PREDNISOLONE ACETATE 1 DROP: 10 SUSPENSION/ DROPS OPHTHALMIC at 13:27

## 2019-08-27 RX ADMIN — Medication 1 CAPSULE: at 17:05

## 2019-08-27 RX ADMIN — BRIMONIDINE TARTRATE 1 DROP: 1.5 SOLUTION OPHTHALMIC at 13:26

## 2019-08-27 RX ADMIN — CEFEPIME HYDROCHLORIDE 1000 MG: 1 INJECTION, POWDER, FOR SOLUTION INTRAMUSCULAR; INTRAVENOUS at 13:23

## 2019-08-27 RX ADMIN — VANCOMYCIN HYDROCHLORIDE 750 MG: 750 INJECTION, SOLUTION INTRAVENOUS at 10:55

## 2019-08-27 RX ADMIN — INSULIN GLARGINE 28 UNITS: 100 INJECTION, SOLUTION SUBCUTANEOUS at 13:20

## 2019-08-27 RX ADMIN — DOXERCALCIFEROL 1 MCG: 4 INJECTION, SOLUTION INTRAVENOUS at 10:26

## 2019-08-27 RX ADMIN — TIMOLOL MALEATE 1 DROP: 2.5 SOLUTION OPHTHALMIC at 21:32

## 2019-08-27 RX ADMIN — CYCLOBENZAPRINE HYDROCHLORIDE 10 MG: 10 TABLET, FILM COATED ORAL at 21:34

## 2019-08-27 RX ADMIN — SERTRALINE HYDROCHLORIDE 50 MG: 50 TABLET ORAL at 13:21

## 2019-08-27 RX ADMIN — VITAMIN D, TAB 1000IU (100/BT) 2000 UNITS: 25 TAB at 13:21

## 2019-08-27 RX ADMIN — ATORVASTATIN CALCIUM 20 MG: 20 TABLET, FILM COATED ORAL at 17:05

## 2019-08-27 RX ADMIN — INSULIN LISPRO 2 UNITS: 100 INJECTION, SOLUTION INTRAVENOUS; SUBCUTANEOUS at 21:33

## 2019-08-27 RX ADMIN — TORSEMIDE 200 MG: 20 TABLET ORAL at 05:55

## 2019-08-27 RX ADMIN — METRONIDAZOLE 500 MG: 500 INJECTION, SOLUTION INTRAVENOUS at 09:31

## 2019-08-27 RX ADMIN — LATANOPROST 1 DROP: 50 SOLUTION OPHTHALMIC at 21:34

## 2019-08-27 RX ADMIN — TORSEMIDE 200 MG: 20 TABLET ORAL at 17:15

## 2019-08-27 RX ADMIN — OXYCODONE HYDROCHLORIDE 10 MG: 10 TABLET ORAL at 18:11

## 2019-08-27 RX ADMIN — BRIMONIDINE TARTRATE 1 DROP: 1.5 SOLUTION OPHTHALMIC at 21:33

## 2019-08-27 RX ADMIN — GABAPENTIN 100 MG: 100 CAPSULE ORAL at 21:34

## 2019-08-27 RX ADMIN — PREDNISOLONE ACETATE 1 DROP: 10 SUSPENSION/ DROPS OPHTHALMIC at 17:13

## 2019-08-27 RX ADMIN — INSULIN LISPRO 1 UNITS: 100 INJECTION, SOLUTION INTRAVENOUS; SUBCUTANEOUS at 07:33

## 2019-08-27 NOTE — PROGRESS NOTES
[facility]  Progress Note Toribio Blanc 1965, 48 y o  female MRN: [id number]  Unit/Bed#: [facility] 510-01 Encounter: [id number]  Primary Care Provider: Myron Waller MD   Date and time admitted to hospital: 8/26/2019 10:23 AM    * Diabetic foot ulcer ([address] )  Assessment & Plan  · POA  · No fever or leukocytosis  Elevated CRP noted  · X-ray obtain in ER, shows No acute osseous abnormality  · Left heel wound/ ulcer with pus like discharge and significant tenderness  · Started on vancomycin, cefepime and Flagyl in ER, discontinue cefepime and Flagyl per Podiatry recommendations, continue vancomycin  · Podiatry consulted, await further imaging  · Sed rate elevated at 81  · Blood cultures pending  · Wound cultures pending  · Consider ID evaluation if needed  Hyperkalemia  Assessment & Plan  Potassium 5 8 In hemodialysis patient  EKG reveals normal sinus rhythm with no T-wave abnormalities  Monitor on telemetry  Received hemodialysis 8/27  Nephrology consulted   2K bath with dialysis and low-potassium diet    Supratherapeutic INR  Assessment & Plan  As mentioned in DVT  Continue to hold Coumadin and monitor INR  If INR fails to improve, will give low-dose vitamin K tomorrow    Anemia in end-stage renal disease (HCC)  Assessment & Plan  Baseline hemoglobin seems between 8-10  Hemoglobin at baseline  Trend CBC  Type 2 diabetes mellitus [facility])  Assessment & Plan  Lab Results   Component Value Date    HGBA1C 10 4 (H) 08/27/2019       Recent Labs     08/26/19  2138 08/27/19  0644 08/27/19  1209 08/27/19  1314   POCGLU 294* 191* 181* 233*       Blood Sugar Average: Last 72 hrs:  (P) 677 [id number]   Not well controlled as noted by HbA1c  On 28 units Lantus at home  Continue basal insulin  Insulin sliding scale and Accu-Cheks  HbA1c elevated, will add meal coverage    ESRD on hemodialysis [facility])  Assessment & Plan  On Tuesday Thursday Saturday schedule    Last dialysis on Saturday which was cut short due to her foot pain per patient  Potassium moderately elevated at 6 3 prior to dialysis today  Nephrology following  Received dialysis treatment with 2K bath today  Continue to monitor    History of DVT (deep vein thrombosis)  Assessment & Plan  · On Coumadin  · INR in ER which is elevated at 7   · No active signs of bleeding  · Hemoglobin 7 7 which is her baseline  · Hold Coumadin and trend INR for now  · Will not reverse INR until patient is bleeding or unless any surgical intervention is planned  · Check FOBT      VTE Pharmacologic Prophylaxis:   Pharmacologic: Pharmacologic VTE Prophylaxis contraindicated due to Supratherapeutic INR  Mechanical VTE Prophylaxis in Place: No    Patient Centered Rounds: I have performed bedside rounds with nursing staff today  Discussions with Specialists or Other Care Team Provider:  Discussed care plan with attending, Dr Luis Enrique Florence, recommending low-dose vitamin K if INR fails to improve by tomorrow morning  Patient is not actively bleeding therefore, will not administer FFP, hemoglobin appears to be at baseline  Discussed patient with Podiatry, recommending discontinuing Flagyl and cefepime given pus production appears to be Staph species and would treat with vancomycin  Podiatry will obtain further imaging and make decision in regards to further management  Education and Discussions with Family / Patient:  Discussed care plan with patient at bedside  Answered all questions to the best my ability  Patient questions her discharge date  Time Spent for Care: 20 minutes  More than 50% of total time spent on counseling and coordination of care as described above      Current Length of Stay: 1 day(s)    Current Patient Status: Inpatient   Certification Statement: The patient will continue to require additional inpatient hospital stay due to Elevated INR, hyperkalemic on telemetry    Discharge Plan:  Patient comes from home, plan to return home once INR returns to therapeutic range  Anticipate 48-72 hours prior to discharge  Code Status: Level 1 - Full Code      Subjective:   Patient reports feeling weak and lightheaded  She reports this is normal after dialysis treatments  Patient was evaluated just following dialysis treatment  She denies chest pain, palpitations or shortness of breath  Denies numbness tingling  She does report pain in her left foot secondary to the ulcer  Objective:     Vitals:   Temp (24hrs), Av 9 °F (36 6 °C), Min:97 5 °F (36 4 °C), Max:98 4 °F (36 9 °C)    Temp:  [97 5 °F (36 4 °C)-98 4 °F (36 9 °C)] 98 °F (36 7 °C)  HR:  [60-76] 72  Resp:  [15-20] 16  BP: ()/(44-71) 109/64  SpO2:  [95 %-98 %] 95 %  Body mass index is 44 71 kg/m²  Input and Output Summary (last 24 hours): Intake/Output Summary (Last 24 hours) at 2019 1513  Last data filed at 2019 1319  Gross per 24 hour   Intake 1110 ml   Output 4460 ml   Net -3350 ml       Physical Exam:     Physical Exam   Constitutional: She appears well-developed and well-nourished  No distress  HENT:   Head: Normocephalic and atraumatic  Eyes: Conjunctivae are normal  No scleral icterus  Cardiovascular: Normal rate and regular rhythm  No murmur heard  Pulmonary/Chest: Effort normal and breath sounds normal  No respiratory distress  She has no wheezes  She has no rales  Abdominal: Soft  She exhibits no distension  There is no tenderness  Musculoskeletal: She exhibits no edema  Left foot: There is tenderness (Wrapped, intact, secondary to ulcer)  Neurological: She is alert  Skin: Skin is warm and dry  No erythema  Psychiatric: She has a normal mood and affect  Nursing note and vitals reviewed        Additional Data:     Labs:    Results from last 7 days   Lab Units 19  0556   WBC Thousand/uL 5 44   HEMOGLOBIN g/dL 7 7*   HEMATOCRIT % 24 4*   PLATELETS Thousands/uL 130*   NEUTROS PCT % 69   LYMPHS PCT % 18   MONOS PCT % 9   EOS PCT % 4     Results from last 7 days   Lab Units 08/27/19  0556   SODIUM mmol/L 136   POTASSIUM mmol/L 6 3*   CHLORIDE mmol/L 100   CO2 mmol/L 26   BUN mg/dL 88*   CREATININE mg/dL 10 20*   ANION GAP mmol/L 10   CALCIUM mg/dL 7 8*   GLUCOSE RANDOM mg/dL 185*     Results from last 7 days   Lab Units 08/27/19  0556   INR  9 36*     Results from last 7 days   Lab Units 08/27/19  1314 08/27/19  1209 08/27/19  0644 08/26/19  2138 08/26/19  1638 08/26/19  1357   POC GLUCOSE mg/dl 233* 181* 191* 294* 126 407*     Results from last 7 days   Lab Units 08/27/19  0556   HEMOGLOBIN A1C % 10 4*               * I Have Reviewed All Lab Data Listed Above  * Additional Pertinent Lab Tests Reviewed: All Labs Within Last 24 Hours Reviewed    Imaging:    Imaging Reports Reviewed Today Include:   · X-ray left ankle 8/26:  No acute osseous abnormality    Imaging Personally Reviewed by Myself Includes:  None    Recent Cultures (last 7 days):           Last 24 Hours Medication List:     Current Facility-Administered Medications:  acetaminophen 650 mg Oral Q6H PRN Lazara Bai MD    ALPRAZolam 0 25 mg Oral BID PRN Lazara Bai MD    atorvastatin 20 mg Oral QPM Lazara Bai MD    b complex-vitamin C-folic acid 1 capsule Oral Daily With Jacqueline Flores MD    brimonidine 1 drop Both Eyes BID Lazara Bai MD    carvedilol 25 mg Oral BID With Meals Lazara Bai MD    cholecalciferol 2,000 Units Oral Daily Lazara Bai MD    cinacalcet 30 mg Oral Daily Lazara Bai MD    cloNIDine 0 1 mg Oral BID Lzaara Bai MD    cyclobenzaprine 10 mg Oral HS Lazara Bai MD    doxercalciferol 1 mcg Intravenous After Dialysis Saul Fuller PA-C    gabapentin 100 mg Oral TID Lazara Bai MD    insulin glargine 28 Units Subcutaneous QAM Lazara Bai MD    insulin lispro 1-5 Units Subcutaneous TID [facility] Lazara Bai MD    insulin lispro 1-5 Units Subcutaneous HS Lazara Bai MD    insulin lispro 4 Units Subcutaneous TID With Meals Keesha MILLAN PA-C    latanoprost 1 drop Both Eyes HS Miller Swift, MD    levothyroxine 50 mcg Oral Early Morning Miller Penroland, MD    loratadine 10 mg Oral Q48H Corene Penroland, MD    losartan 25 mg Oral Daily Corene Penroland, MD    melatonin 3 mg Oral HS Miller Swift, MD    oxyCODONE 10 mg Oral Q6H PRN Flacone Penroland, MD    oxyCODONE 5 mg Oral Q6H PRN Corene Penning, MD    pantoprazole 40 mg Oral Daily Corene Penning, MD    prednisoLONE acetate 1 drop Both Eyes 4x Daily Miller Swift, MD    sertraline 50 mg Oral Daily Corene Penroland, MD    timolol 1 drop Ophthalmic BID Miller Swift, MD    torsemide 200 mg Oral Q12H Corene Penroland, MD    vancomycin 10 mg/kg (Adjusted) Intravenous Daily PRN Miller Swift, MD Last Rate: Stopped (08/27/19 1200)        Today, Patient Was Seen By: Shanelle Hyde PA-C    ** Please Note: Dictation voice to text software may have been used in the creation of this document   **

## 2019-08-27 NOTE — ASSESSMENT & PLAN NOTE
· On Coumadin  · INR in ER which is elevated at 7   · No active signs of bleeding  · Hemoglobin 7 7 which is her baseline  · Hold Coumadin and trend INR for now  · Will not reverse INR until patient is bleeding or unless any surgical intervention is planned    · Check FOBT

## 2019-08-27 NOTE — PLAN OF CARE
UF goal set to remove 4 kg as tolerated  Pt potassium 6 3 today, pt being dialyzed on 2K bath per Dr Anu Segovia  Encouraged pt compliance with low potassium diet and fluid restrictions

## 2019-08-27 NOTE — UTILIZATION REVIEW
Initial Clinical Review    Admission: Date/Time/Statement: Inpatient Admission Orders (From admission, onward)     Ordered        08/26/19 1249  Inpatient Admission  Once             Orders Placed This Encounter   Procedures    Inpatient Admission     Standing Status:   Standing     Number of Occurrences:   1     Order Specific Question:   Admitting Physician     Answer:   Ej Olvera [[id number]]     Order Specific Question:   Level of Care     Answer:   Med Surg [[id number]]     Order Specific Question:   Estimated length of stay     Answer:   More than 2 Midnights     Order Specific Question:   Certification     Answer:   I certify that inpatient services are medically necessary for this patient for a duration of greater than two midnights  See H&P and MD Progress Notes for additional information about the patient's course of treatment  ED Arrival Information     Expected Arrival Acuity Means of Arrival Escorted By Service Admission Type    - 8/26/2019 10:14 Urgent Walk-In Self General Medicine Urgent    Arrival Complaint    foot and leg pain     Chief Complaint   Patient presents with    Leg Pain     Patient presents to the E R  with left leg and foot pain  Chief Complaint:   Left heel ulcer and pain     History of Present Illness:   Roberta Klinefelter is a 48 y o  female with medical history of end-stage renal disease on hemodialysis, diabetes on insulin, DVT on Coumadin who presents with left heel pain and ulcer  Patient has known history of diabetes and she is on insulin for that  She reports that her blood sugar at home is very variable ranging from 40- 350  She takes Lantus in the morning and uses low sliding scale as needed  She follows up with podiatrist   She had known wound on left heel although since Saturday, she started noticing that wound was more open and painful and there was pus like discharge coming out of it    Her boyfriend put a dressing on it but progressively pain was getting worse and she is not able to bear any weight on left heel due to significant pain and tenderness in the ulcer area  She continues to have pus like drainage from the wound  She also reports that she had low-grade fever at home  Her last dialysis treatment was on Saturday which was cut short due to her heel pain  She has what sounds like osteoarthritis in her knees and due to inability to bear weight on her left heel, she has been putting more weight on her right side which has been causing worsening right knee pain  She denies any chills or rigors      Review of Systems:  Constitutional: Positive for activity change, fatigue and fever  Musculoskeletal: Positive for arthralgias  Skin: Positive for wound  Assessment/Plan:   * Diabetic foot ulcer ([facility] )  Assessment & Plan  · POA  · No fever or leukocytosis  Elevated CRP noted  · X-ray obtain in ER, shows No acute osseous abnormality  · Left heel wound/ ulcer with pus like discharge and significant tenderness       PLAN:  · Started on vancomycin, cefepime and Flagyl in ER, continue same for now  · Podiatry consult  Defer to Podiatry for further imaging including MRI  · Check sed rate  · Check blood cultures  · Check wound culture from the drainage from ulcer  · Consider ID evaluation if needed      Hyperkalemia  Assessment & Plan  Potassium 5 8 In hemodialysis patient  Obtain EKG  Monitor on telemetry  Last hemodialysis on Saturday  Nephrology consult      Type 2 diabetes mellitus ([facility] )  Assessment & Plan  Lab Results   Component Value Date     HGBA1C 8 9 (H) 04/28/2019       Not well controlled as noted by HbA1c  On 28 units Lantus at home  Continue basal insulin  Insulin sliding scale and Accu-Cheks  Obtain repeat HbA1c      ESRD on hemodialysis [facility])  Assessment & Plan  On Tuesday Thursday Saturday schedule  Last dialysis on Saturday which was cut short due to her foot pain per patient  Potassium mildly elevated at 5 8  Obtain EKG    Nephrology consult      VTE Prophylaxis: Warfarin (Coumadin)  / sequential compression device     Anticipated Length of Stay:  Patient will be admitted on an Inpatient basis with an anticipated length of stay of  more than 2 midnights     Justification for Hospital Stay:  Ongoing evaluation    ED Triage Vitals   Temperature Pulse Respirations Blood Pressure SpO2   08/26/19 1029 08/26/19 1029 08/26/19 1029 08/26/19 1029 08/26/19 1029   97 9 °F (36 6 °C) 73 18 113/55 96 %      Temp Source Heart Rate Source Patient Position - Orthostatic VS BP Location FiO2 (%)   08/26/19 1029 08/26/19 1402 08/26/19 1402 08/26/19 1029 --   Oral Monitor Lying Right arm       Pain Score       08/26/19 1029       Worst Possible Pain        Wt Readings from Last 1 Encounters:   08/26/19 126 kg (276 lb 14 4 oz)     Additional Vital Signs:   08/27/19 0711  98 4 °F (36 9 °C)  65  18  103/44Abnormal   95 %    08/27/19 0334  97 9 °F (36 6 °C)  60  20  102/54  98 %    08/26/19 2340  97 8 °F (36 6 °C)  76  18  100/50  96 %    08/26/19 2026  97 8 °F (36 6 °C)  72  16  99/50  96 %    08/26/19 1504  97 6 °F (36 4 °C)  74  16  136/55  99 %      Pertinent Labs/Diagnostic Test Results:   Results from last 7 days   Lab Units 08/27/19  0556 08/26/19  1154   WBC Thousand/uL 5 44 5 77   HEMOGLOBIN g/dL 7 7* 8 3*   HEMATOCRIT % 24 4* 25 3*   PLATELETS Thousands/uL 130* 136*   NEUTROS ABS Thousands/µL 3 75 4 27     Results from last 7 days   Lab Units 08/27/19  0556 08/26/19  1154   SODIUM mmol/L 136 132*   POTASSIUM mmol/L 6 3* 5 8*   CHLORIDE mmol/L 100 97*   CO2 mmol/L 26 27   ANION GAP mmol/L 10 8   BUN mg/dL 88* 58*   CREATININE mg/dL 10 20* 9 26*   EGFR ml/min/1 73sq m 4 4   CALCIUM mg/dL 7 8* 7 9*     Results from last 7 days   Lab Units 08/27/19  1314 08/27/19  1209 08/27/19  0644 08/26/19  2138 08/26/19  1638 08/26/19  1357   POC GLUCOSE mg/dl 233* 181* 191* 294* 126 407*     Results from last 7 days   Lab Units 08/27/19  0556   HEMOGLOBIN A1C % 10 4*   EAG mg/dl 252     Results from last 7 days   Lab Units 08/27/19  0556 08/26/19  1309   PROTIME seconds 75 5* 60 4*   INR  9 36* 7 06*     Results from last 7 days   Lab Units 08/26/19  1309 08/26/19  1154   CRP mg/L  --  27 2*   SED RATE mm/hour 81*  --      LEFT ANKLE X RAY -  No acute osseous abnormality      ED Treatment:   Medication Administration from 08/26/2019 1014 to 08/26/2019 1445       Date/Time Order Dose Route Action     08/26/2019 1106 oxyCODONE (ROXICODONE) IR tablet 5 mg 5 mg Oral Given     08/26/2019 1300 cefepime (MAXIPIME) 2 g/50 mL dextrose IVPB 0 mg Intravenous Stopped     08/26/2019 1157 cefepime (MAXIPIME) 2 g/50 mL dextrose IVPB 2,000 mg Intravenous New Bag     08/26/2019 1313 vancomycin (VANCOCIN) 1,750 mg in sodium chloride 0 9 % 500 mL IVPB 1,750 mg Intravenous New Bag     08/26/2019 1423 oxyCODONE (ROXICODONE) immediate release tablet 10 mg 10 mg Oral Given     08/26/2019 1419 insulin regular (HumuLIN R,NovoLIN R) injection 10 Units 10 Units Intravenous Given     Past Medical History:   Diagnosis Date    Abnormal uterine bleeding (AUB)     Anxiety     Diabetes mellitus ([place] [address] )     Disease of thyroid gland     [facility] venous thrombosis) ([facility])     End stage kidney disease ([place] [address] )     Foot ulcer due to secondary DM ([place] [address] )     Hypertension     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[facility] )     right eye    Morbid obesity ([facility] )     Panic attacks     Reflux esophagitis     Thrombophlebitis of saphenous vein     Warfarin anticoagulation      Present on Admission:   Type 2 diabetes mellitus ([facility] )   Anemia in end-stage renal disease (HCC)   Supratherapeutic INR    Admitting Diagnosis: Hyperkalemia [E87 5]  Leg pain [M79 606]  ESRD on hemodialysis ([facility] ) [N18 6, Z99 2]    Age/Sex: 48 y o  female    Admission Orders:  TELEMETRY  PREVALON BOOT  UP + OOB as Tolerated  Diet Renal; Potassium 2 GM; Yes; Fluid Restriction 1800 ML   BBG QID W/ RHI SS PER ALGORITHM  HEMODIALYSIS 3 X/ WEEK    Current Facility-Administered Medications:  acetaminophen 650 mg Oral Q6H PRN   ALPRAZolam 0 25 mg Oral BID PRN   atorvastatin 20 mg Oral QPM   b complex-vitamin C-folic acid 1 capsule Oral Daily With Dinner   brimonidine 1 drop Both Eyes BID   carvedilol 25 mg Oral BID With Meals   cefepime 1,000 mg Intravenous Q24H   cholecalciferol 2,000 Units Oral Daily   cinacalcet 30 mg Oral Daily   cloNIDine 0 1 mg Oral BID   cyclobenzaprine 10 mg Oral HS   doxercalciferol 1 mcg Intravenous After Dialysis  GIVEN X 1   gabapentin 100 mg Oral TID   insulin glargine 28 Units Subcutaneous QAM   insulin lispro 1-5 Units Subcutaneous TID AC   insulin lispro 1-5 Units Subcutaneous HS   latanoprost 1 drop Both Eyes HS   levothyroxine 50 mcg Oral Early Morning   loratadine 10 mg Oral Q48H   losartan 25 mg Oral Daily   melatonin 3 mg Oral HS   metroNIDAZOLE 500 mg Intravenous Q8H   oxyCODONE 10 mg Oral Q6H PRN  GIVEN X 3   oxyCODONE 5 mg Oral Q6H PRN   pantoprazole 40 mg Oral Daily   prednisoLONE acetate 1 drop Both Eyes 4x Daily   sertraline 50 mg Oral Daily   timolol 1 drop Ophthalmic BID   torsemide 200 mg Oral Q12H   vancomycin 10 mg/kg (Adjusted) Intravenous Daily PRN     IP CONSULT TO PODIATRY  IP CONSULT TO NEPHROLOGY  IP CONSULT TO PHARMACY    Network Utilization Review Department  Phone: [phone]; Fax [phone]  [email or web address]  ATTENTION: Please call with any questions or concerns to [phone]  and carefully listen to the prompts so that you are directed to the right person  Send all requests for admission clinical reviews, approved or denied determinations and any other requests to fax [phone]   All voicemails are confidential

## 2019-08-27 NOTE — PROGRESS NOTES
NEPHROLOGY PROGRESS NOTE   Yeyo Rodrigez 48 y o  female MRN: [id number]  Unit/Bed#: [facility] 510-01 Encounter: [id number]      ASSESSMENT & PLAN:  1  ESRD on HD TTS at [facility] in [address]  Patient seen during dialysis treatment at 9:30 a m , her AV fistula is cannulated, her blood pressure is in the lower side, she denies any symptoms other than heel pain  Continue with dialysis, UF as tolerated  2K bath due to hyperkalemia, patient on low-potassium diet  2  Left heel wound, awaiting podiatry evaluation  3  Hyperkalemia, 2 K bath dialysis today  Continues with a low-potassium diet    4  Anemia chronic kidney disease, no EPO due to history of PE/DVT, continue following H&H and transfusion as needed  5  Mineral bone disease, continue renal diet  6  Coagulopathy, keep holding Coumadin, noted INR 9 8 today  7  Access, left upper extremity AV fistula, cannulated with blood flow    Discussed with [facility] primary team      SUBJECTIVE:  Patient seen and examined during dialysis at 9:30 a m , her AV fistula cannulated with blood flow, denies any chest pain or shortness of breath, complaining of left hell pain  Denies any nausea, no vomiting, no dysuria    Discussed with patient to do full treatment, she is currently 5 kilos over her dry weight    OBJECTIVE:  Current Weight: Weight - Scale: 126 kg (276 lb 14 4 oz)  Vitals:    08/27/19 0915   BP: (!) 99/46   Pulse: 66   Resp: 18   Temp:    SpO2:        Intake/Output Summary (Last 24 hours) at 8/27/2019 0936  Last data filed at 8/27/2019 0809  Gross per 24 hour   Intake 790 ml   Output 0 ml   Net 790 ml     General: conscious, cooperative, in not acute distress  Eyes: conjunctivae pale, anicteric sclerae  ENT: lips and mucous membranes moist  Neck: supple, no JVD  Chest: clear breath sounds bilateral, no crackles, ronchus or wheezings  CVS: distinct S1 & S2, normal rate, regular rhythm  Abdomen: soft, non-tender, non-distended, normoactive bowel sounds  Extremities: no significant edema of both legs, left upper extremity AV fistula cannulated with blood flow, left foot covered with dressing  Skin: no rash  Neuro: awake, alert, oriented        Medications:    Current Facility-Administered Medications:     acetaminophen (TYLENOL) tablet 650 mg, 650 mg, Oral, Q6H PRN, Daryn Whelan MD, 650 mg at 08/26/19 1841    ALPRAZolam (XANAX) tablet 0 25 mg, 0 25 mg, Oral, BID PRN, Daryn Whelan MD    atorvastatin (LIPITOR) tablet 20 mg, 20 mg, Oral, QPM, Daryn Whelan MD, 20 mg at 08/26/19 1740    b complex-vitamin C-folic acid (NEPHROCAPS) capsule 1 capsule, 1 capsule, Oral, Daily With Dinner, Daryn Whelan MD, 1 capsule at 08/26/19 1636    brimonidine (ALPHAGAN P) 0 15 % ophthalmic solution 1 drop, 1 drop, Both Eyes, BID, Daryn Whelan MD, 1 drop at 08/26/19 1741    carvedilol (COREG) tablet 25 mg, 25 mg, Oral, BID With Meals, Daryn Whelan MD, 25 mg at 08/26/19 1633    cefepime (MAXIPIME) 1,000 mg in dextrose 5 % 50 mL IVPB, 1,000 mg, Intravenous, Q24H, Daryn Whelan MD    cholecalciferol (VITAMIN D3) tablet 2,000 Units, 2,000 Units, Oral, Daily, Daryn Whelan MD, 2,000 Units at 08/26/19 1655    cinacalcet (SENSIPAR) tablet 30 mg, 30 mg, Oral, Daily, Daryn Whelan MD, 30 mg at 08/26/19 1700    cloNIDine (CATAPRES) tablet 0 1 mg, 0 1 mg, Oral, BID, Daryn Whelan MD, 0 1 mg at 08/26/19 1740    cyclobenzaprine (FLEXERIL) tablet 10 mg, 10 mg, Oral, HS, Daryn Whelan MD, 10 mg at 08/26/19 2155    doxercalciferol (HECTOROL) injection 1 mcg, 1 mcg, Intravenous, After Dialysis, Alveria Hey, PA-C    gabapentin (NEURONTIN) capsule 100 mg, 100 mg, Oral, TID, Daryn Whelan MD, 100 mg at 08/26/19 2026    insulin glargine (LANTUS) subcutaneous injection 28 Units 0 28 mL, 28 Units, Subcutaneous, QAM, Daryn Whelan MD    insulin lispro (HumaLOG) 100 units/mL subcutaneous injection 1-5 Units, 1-5 Units, Subcutaneous, TID AC, 1 Units at 08/27/19 4752 **AND** Fingerstick Glucose (POCT), , , TID AC, Faiza Bailey MD    insulin lispro (HumaLOG) 100 units/mL subcutaneous injection 1-5 Units, 1-5 Units, Subcutaneous, HS, Faiza Bailey MD, 2 Units at 08/26/19 2157    latanoprost (XALATAN) 0 005 % ophthalmic solution 1 drop, 1 drop, Both Eyes, HS, Faiza Bailye MD, 1 drop at 08/26/19 2155    levothyroxine tablet 50 mcg, 50 mcg, Oral, Early Morning, Faiza Bailey MD, 50 mcg at 08/27/19 0555    loratadine (CLARITIN) tablet 10 mg, 10 mg, Oral, Q48H, Faiza Bailey MD, 10 mg at 08/26/19 1633    losartan (COZAAR) tablet 25 mg, 25 mg, Oral, Daily, Faiza Bailey MD, 25 mg at 08/26/19 1656    melatonin tablet 3 mg, 3 mg, Oral, HS, Faiza Bailey MD, 3 mg at 08/26/19 2155    metroNIDAZOLE (FLAGYL) IVPB (premix) 500 mg, 500 mg, Intravenous, Q8H, Faiza Bailey MD, Last Rate: 200 mL/hr at 08/27/19 0931, 500 mg at 08/27/19 0931    oxyCODONE (ROXICODONE) immediate release tablet 10 mg, 10 mg, Oral, Q6H PRN, Faiza Bailey MD, 10 mg at 08/27/19 0739    oxyCODONE (ROXICODONE) IR tablet 5 mg, 5 mg, Oral, Q6H PRN, Faiza Bailey MD    pantoprazole (PROTONIX) EC tablet 40 mg, 40 mg, Oral, Daily, Faiza Bailey MD, 40 mg at 08/27/19 0555    prednisoLONE acetate (PRED FORTE) 1 % ophthalmic suspension 1 drop, 1 drop, Both Eyes, 4x Daily, Faiza Bailey MD, 1 drop at 08/26/19 2155    sertraline (ZOLOFT) tablet 50 mg, 50 mg, Oral, Daily, Faiza Bailey MD, 50 mg at 08/26/19 1655    timolol (TIMOPTIC) 0 25 % ophthalmic solution 1 drop, 1 drop, Ophthalmic, BID, Faiza Bailey MD, 1 drop at 08/26/19 1743    torsemide (DEMADEX) tablet 200 mg, 200 mg, Oral, Q12H, Faiza Bailey MD, 200 mg at 08/27/19 0555    vancomycin (VANCOCIN) IVPB (premix) 750 mg, 10 mg/kg (Adjusted), Intravenous, Daily PRN, Faiza Bailey MD    Invasive Devices:        Lab Results:   Results from last 7 days   Lab Units 08/27/19  0556 08/26/19  1154   WBC Thousand/uL 5 44 5 77   HEMOGLOBIN g/dL 7 7* 8 3* HEMATOCRIT % 24 4* 25 3*   PLATELETS Thousands/uL 130* 136*   SODIUM mmol/L 136 132*   POTASSIUM mmol/L 6 3* 5 8*   CHLORIDE mmol/L 100 97*   CO2 mmol/L 26 27   BUN mg/dL 88* 58*   CREATININE mg/dL 10 20* 9 26*   CALCIUM mg/dL 7 8* 7 9*           Portions of the record may have been created with voice recognition software  Occasional wrong word or "sound a like" substitutions may have occurred due to the inherent limitations of voice recognition software  Read the chart carefully and recognize, using context, where substitutions have occurred  If you have any questions, please contact the dictating provider

## 2019-08-27 NOTE — PLAN OF CARE
Problem: Potential for Falls  Goal: Patient will remain free of falls  Description  INTERVENTIONS:  - Assess patient frequently for physical needs  -  Identify cognitive and physical deficits and behaviors that affect risk of falls    -  [place] fall precautions as indicated by assessment   - Educate patient/family on patient safety including physical limitations  - Instruct patient to call for assistance with activity based on assessment  - Modify environment to reduce risk of injury  - Consider OT/PT consult to assist with strengthening/mobility  Outcome: Progressing     Problem: PAIN - ADULT  Goal: Verbalizes/displays adequate comfort level or baseline comfort level  Description  Interventions:  - Encourage patient to monitor pain and request assistance  - Assess pain using appropriate pain scale  - Administer analgesics based on type and severity of pain and evaluate response  - Implement non-pharmacological measures as appropriate and evaluate response  - Consider cultural and social influences on pain and pain management  - Notify physician/advanced practitioner if interventions unsuccessful or patient reports new pain  Outcome: Progressing     Problem: METABOLIC, FLUID AND ELECTROLYTES - ADULT  Goal: Electrolytes maintained within normal limits  Description  INTERVENTIONS:  - Monitor labs and assess patient for signs and symptoms of electrolyte imbalances  - Administer electrolyte replacement as ordered  - Monitor response to electrolyte replacements, including repeat lab results as appropriate  - Instruct patient on fluid and nutrition as appropriate  Outcome: Progressing  Goal: Fluid balance maintained  Description  INTERVENTIONS:  - Monitor labs   - Monitor I/O and WT  - Instruct patient on fluid and nutrition as appropriate  - Assess for signs & symptoms of volume excess or deficit  Outcome: Progressing  Goal: Glucose maintained within target range  Description  INTERVENTIONS:  - Monitor Blood Glucose as ordered  - Assess for signs and symptoms of hyperglycemia and hypoglycemia  - Administer ordered medications to maintain glucose within target range  - Assess nutritional intake and initiate nutrition service referral as needed  Outcome: Progressing

## 2019-08-27 NOTE — ASSESSMENT & PLAN NOTE
· POA  · No fever or leukocytosis  Elevated CRP noted  · X-ray obtain in ER, shows No acute osseous abnormality  · Left heel wound/ ulcer with pus like discharge and significant tenderness  · Started on vancomycin, cefepime and Flagyl in ER, discontinue cefepime and Flagyl per Podiatry recommendations, continue vancomycin  · Podiatry consulted, await further imaging  · Sed rate elevated at 81  · Blood cultures pending  · Wound cultures pending  · Consider ID evaluation if needed

## 2019-08-27 NOTE — ASSESSMENT & PLAN NOTE
On Tuesday Thursday Saturday schedule  Last dialysis on Saturday which was cut short due to her foot pain per patient    Potassium moderately elevated at 6 3 prior to dialysis today  Nephrology following  Received dialysis treatment with 2K bath today  Continue to monitor

## 2019-08-27 NOTE — PHYSICIAN ADVISOR
Current patient class: Inpatient  The patient is currently on Hospital Day: 2 at [address] [place] [address]       The patient is  documented to require at least a 2nd midnight in the hospital  As such the patient is  expected to satisfy the 2 midnight benchmark and is therefore eligible for inpatient admission  After review of the relevant documentation, labs, vital signs and test results, the patient is appropriate for INPATIENT ADMISSION  Admission to the hospital as an inpatient is a complex decision making process which requires the practitioner to consider the patients presenting complaint, history and physical examination and all relevant testing  With this in mind, in this case, the patient was deemed appropriate for INPATIENT ADMISSION  After review of the documentation and testing available at the time of the admission I concur with this clinical determination of medical necessity  Rationale is as follows: The patient is a 48 yrs Female who presented to the ED at 8/26/2019 10:23 AM with a chief complaint of Leg Pain (Patient presents to the E R  with leg and foot pain )  Patient with history of uncontrolled type 2 diabetes presented with a complain of worsening pain and discharge from left heel wound-labs showed elevated ESR and CRP-she was admitted for diabetic foot ulcer, started on IV antibiotics  Podiatry consultation has been requested and is pending    INR was noted to be elevated at 7-Coumadin has been held and labs are being monitored  She is appropriate for inpatient admission for ongoing workup and management  The patients vitals on arrival were ED Triage Vitals   Temperature Pulse Respirations Blood Pressure SpO2   08/26/19 1029 08/26/19 1029 08/26/19 1029 08/26/19 1029 08/26/19 1029   97 9 °F (36 6 °C) 73 18 113/55 96 %      Temp Source Heart Rate Source Patient Position - Orthostatic VS BP Location FiO2 (%)   08/26/19 1029 08/26/19 1402 08/26/19 1402 19 1029 --   Oral Monitor Lying Right arm       Pain Score       19 1029       Worst Possible Pain           Past Medical History:   Diagnosis Date    Abnormal uterine bleeding (AUB)     Anxiety     Diabetes mellitus ([facility] )     Disease of thyroid gland     DVT (deep venous thrombosis) (HCC)     End stage kidney disease (HCC)     Foot ulcer due to secondary DM ([facility] )     Hypertension     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     right eye    Morbid obesity (HCC)     Panic attacks     Reflux esophagitis     Thrombophlebitis of saphenous vein     Warfarin anticoagulation      Past Surgical History:   Procedure Laterality Date    ARTERIOVENOUS GRAFT PLACEMENT      CERVICAL BIOPSY  W/ LOOP ELECTRODE EXCISION       SECTION      DIALYSIS FISTULA CREATION      DILATION AND CURETTAGE OF UTERUS      ENDOMETRIAL ABLATION W/ NOVASURE      EYE SURGERY      HYSTERECTOMY      @ age 57 (complete)    OOPHORECTOMY Bilateral     @ age 57   [address] FLX DX W/LANDON Castillo  PFRMD N/A 3/13/2019    Procedure: COLONOSCOPY;  Surgeon: Jessie Gray MD;  [facility]; Service: Gastroenterology    NV ESOPHAGOGASTRODUODENOSCOPY TRANSORAL DIAGNOSTIC N/A 3/13/2019    Procedure: ESOPHAGOGASTRODUODENOSCOPY (EGD); Surgeon: Jessie Gray MD;  [facility];   Service: Gastroenterology    NV LAPAROSCOPY W TOT HYSTERECT UTERUS 250 GRAM OR LESS N/A 2016    Procedure: HYSTERECTOMY LAPAROSCOPIC TOTAL ([address] [place] [address]), with bilateral salpingectomy;  Surgeon: Charly Abernathy DO;  [facility];  Service: Gynecology    THROMBECTOMY / ARTERIOVENOUS GRAFT REVISION      TOE SURGERY Right     removal of the 4th toe       The patient was admitted to the hospital at 12:49 PM on 19 for the following diagnosis:  Hyperkalemia [E87 5]  Leg pain [M79 606]  ESRD on hemodialysis ([facility] ) [N18 6, Z99 2]    Consults have been placed to:   IP CONSULT TO PODIATRY  IP CONSULT TO NEPHROLOGY  IP CONSULT TO PHARMACY    Vitals:    08/27/19 1130 08/27/19 1202 08/27/19 1314 08/27/19 1500   BP: 108/54 114/70 109/64 (!) 92/43   BP Location:  Right arm  Right arm   Pulse: 66 71 72 74   Resp: 20 20 16 16   Temp:  97 5 °F (36 4 °C) 98 °F (36 7 °C) 98 1 °F (36 7 °C)   TempSrc:  Oral Oral Oral   SpO2:    93%   Weight:       Height:           Most recent labs:    Recent Labs     08/27/19  0556   WBC 5 44   HGB 7 7*   HCT 24 4*   *   K 6 3*   CALCIUM 7 8*   BUN 88*   CREATININE 10 20*   INR 9 36*       Scheduled Meds:  Current Facility-Administered Medications:  acetaminophen 650 mg Oral Q6H PRN Duane Goodrich MD    ALPRAZolam 0 25 mg Oral BID PRN Duane Goodrich MD    atorvastatin 20 mg Oral QPM Duane Goodrich MD    b complex-vitamin C-folic acid 1 capsule Oral Daily With Kat Smallwood MD    brimonidine 1 drop Both Eyes BID Duane Goodrich MD    carvedilol 25 mg Oral BID With Meals Duane Goodrich MD    cholecalciferol 2,000 Units Oral Daily Duane Goodrich MD    cinacalcet 30 mg Oral Daily Duane Goodrich MD    cloNIDine 0 1 mg Oral BID Duane Goodrich MD    cyclobenzaprine 10 mg Oral HS Duane Goodrich MD    doxercalciferol 1 mcg Intravenous After Dialysis Pedro Cardona PA-C    gabapentin 100 mg Oral TID Duane Goodrich MD    insulin glargine 28 Units Subcutaneous QAM Duane Goodrich MD    insulin lispro 1-5 Units Subcutaneous TID [facility] Duane Goodrich MD    insulin lispro 1-5 Units Subcutaneous HS Duane Goodrich MD    insulin lispro 4 Units Subcutaneous TID With Meals Keesha MILLAN PA-C    latanoprost 1 drop Both Eyes HS Duane Goodrich MD    levothyroxine 50 mcg Oral Early Morning Duane Goodrich MD    loratadine 10 mg Oral Q48H Duane Goodrich MD    losartan 25 mg Oral Daily Duane Goodrich MD    melatonin 3 mg Oral HS Duane Goodrich MD    oxyCODONE 10 mg Oral Q6H PRN Duane Goodrich MD    oxyCODONE 5 mg Oral Q6H PRN Duane Goodrich MD    pantoprazole 40 mg Oral Daily Duane Goodrich MD prednisoLONE acetate 1 drop Both Eyes 4x Daily Joss Ashley MD    sertraline 50 mg Oral Daily Joss Ashley MD    timolol 1 drop Ophthalmic BID Joss Ashley MD    torsemide 200 mg Oral Q12H Joss Ashley MD    vancomycin 10 mg/kg (Adjusted) Intravenous Daily PRN Joss Ashley MD Last Rate: Stopped (08/27/19 1200)     Continuous Infusions:   PRN Meds:   acetaminophen    ALPRAZolam    doxercalciferol    oxyCODONE    oxyCODONE    vancomycin    Surgical procedures (if appropriate):

## 2019-08-27 NOTE — SOCIAL WORK
Met with pt to discuss role of CM and discuss any needs pt may have prior to d/c  Pt lives with her boyfriend Jose Del Cid, her son, and her niece in a 2 SH with 21 BARBAR[address] and 13 steps to the 2nd floor  Pt bedroom and bathroom on 2nd floor  Pt does have access to 1/2 bath on the first floor  Pt performed ADLs independently pta  Pt ambulates independently  No DME  Pt notes past hx of HHC (believes with [facility]) approximately 7 years ago following a short hospital stay  No hx STR  Pt denies hx of MH or D&A tx  Pt PCP is Dr Maren Brumfield  Pt preferred pharmacy is [address] [place] [address] on [address] in [place]  Contact: Surjit Youngblood-Significant Other ([phone])  No POA or Living Will   Pt  Significant Other will transport home at time of d/c      CM reviewed d/c planning process including the following: identifying help at home, patient preference for d/c planning needs, Discharge Lounge, [facility] Meds to Bed program, availability of treatment team to discuss questions or concerns patient and/or family may have regarding understanding medications and recognizing signs and symptoms once discharged  CM also encouraged patient to follow up with all recommended appointments after discharge  Patient advised of importance for patient and family to participate in managing patients medical well being

## 2019-08-27 NOTE — ASSESSMENT & PLAN NOTE
As mentioned in DVT    Continue to hold Coumadin and monitor INR  If INR fails to improve, will give low-dose vitamin K tomorrow

## 2019-08-27 NOTE — ASSESSMENT & PLAN NOTE
Potassium 5 8 In hemodialysis patient  EKG reveals normal sinus rhythm with no T-wave abnormalities  Monitor on telemetry    Received hemodialysis 8/27  Nephrology consulted   2K bath with dialysis and low-potassium diet

## 2019-08-27 NOTE — ASSESSMENT & PLAN NOTE
Lab Results   Component Value Date    HGBA1C 10 4 (H) 08/27/2019       Recent Labs     08/26/19  2138 08/27/19  0644 08/27/19  1209 08/27/19  1314   POCGLU 294* 191* 181* 233*       Blood Sugar Average: Last 72 hrs:  (P) 839 [id number]   Not well controlled as noted by HbA1c  On 28 units Lantus at home  Continue basal insulin  Insulin sliding scale and Accu-Cheks    HbA1c elevated, will add meal coverage

## 2019-08-27 NOTE — CONSULTS
Consult - Podiatry   Kylee Tiwari 48 y o  female MRN: [id number]  Unit/Bed#: [facility] 510-01 Encounter: [id number]    Assessment/Plan     Assessment:    1  Wound of the left 5th digit  2  Posterior heel fissure  3  Supratherapeutic INR  4  Hyperkalemia  5  End-stage renal disease  6  Type 2 diabetes mellitus    Plan:  · X-rays been ordered  · Wound culture obtained of left 5th digit purulence  · Left 5th digit ulcer was debrided  · Pre-debridement wound measures 0 cm diameter Following a time out and verbal consent, Excisional debridement was performed with a #15 blade to remove non-viable necrotic tissue, skin and fat  Wound was debrided down to the level of subcutaneous tissue  Hemostasis was controlled with pressure  No pain noted during procedure  Post debridement measures 0 7 cm   for a total of less than 10 square centimeters  Wound appears fibrotic  · Left posterior heel is painful to palpation  Debridement not thought to be a good idea due to supratherapeutic INR  · Offloading heel is recommended due to pain of left heel  · Rest of medical care per the primary team    History of Present Illness     HPI:  Kylee Tiwari is a 48 y o  female who presents with ulcer the left 5th digit and posterior heel fissure of the left foot  She has severe pain of the left posterior heel  She denies trauma to the area  Inpatient consult to Podiatry     Performed by  Sourav Snyder DPM     Authorized by Christian Castaneda DO            Review of Systems   Constitutional: Negative  HENT: Negative  Eyes: Negative  Respiratory: Negative  Cardiovascular: Negative  Gastrointestinal: Negative  Musculoskeletal:  Negative   Skin:  Left foot ulcer   Neurological: Negative  Psych: Negative         Historical Information   Past Medical History:   Diagnosis Date    Abnormal uterine bleeding (AUB)     Anxiety     Diabetes mellitus ([address] )     Disease of thyroid gland     [facility]     End stage kidney disease ([facility]     Foot ulcer due to secondary DM ([facility] )     Hypertension     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[facility] )     right eye    Morbid obesity (HCC)     Panic attacks     Reflux esophagitis     Thrombophlebitis of saphenous vein     Warfarin anticoagulation      Past Surgical History:   Procedure Laterality Date    ARTERIOVENOUS GRAFT PLACEMENT      CERVICAL BIOPSY  W/ LOOP ELECTRODE EXCISION       SECTION      DIALYSIS FISTULA CREATION      DILATION AND CURETTAGE OF UTERUS      ENDOMETRIAL ABLATION W/ NOVASURE      EYE SURGERY      HYSTERECTOMY      @ age 57 (complete)    OOPHORECTOMY Bilateral     @ age 57   [place] [address]      [place] COLONOSCOPY FLX DX W/COLLJ [id number] PFRMD N/A 3/13/2019    Procedure: COLONOSCOPY;  Surgeon: Pj Ariza MD;  [facility]; Service: Gastroenterology    SD ESOPHAGOGASTRODUODENOSCOPY TRANSORAL DIAGNOSTIC N/A 3/13/2019    Procedure: ESOPHAGOGASTRODUODENOSCOPY (EGD); Surgeon: Pj Ariza MD;  [facility];   Service: Gastroenterology    SD LAPAROSCOPY W TOT HYSTERECT UTERUS 250 GRAM OR LESS N/A 2016    Procedure: HYSTERECTOMY LAPAROSCOPIC TOTAL ([address] [facility] [address]), with bilateral salpingectomy;  Surgeon: Humera Acevedo DO;  [facility];  Service: Gynecology    THROMBECTOMY / ARTERIOVENOUS GRAFT REVISION      TOE SURGERY Right     removal of the 4th toe     Social History   Social History     Substance and Sexual Activity   Alcohol Use Not Currently    Alcohol/week: 0 0 standard drinks    Frequency: Never    Drinks per session: Patient refused    Binge frequency: Patient refused     Social History     Substance and Sexual Activity   Drug Use No     Social History     Tobacco Use   Smoking Status Never Smoker   Smokeless Tobacco Never Used     Family History:   Family History   Problem Relation Age of Onset    Heart disease Family     Diabetes Family     Heart disease Mother     Cancer Brother neck    Throat cancer Brother     Ovarian cancer Maternal Aunt 40       Meds/Allergies   Medications Prior to Admission   Medication    ALPRAZolam (XANAX) 0 25 mg tablet    atorvastatin (LIPITOR) 20 mg tablet    atropine (ISOPTO ATROPINE) 1 % ophthalmic solution    B Complex-C-Folic Acid (TRIPHROCAPS) 1 MG CAPS    brimonidine (ALPHAGAN P) 0 15 % ophthalmic solution    CALCIUM CARBONATE PO    carvedilol (COREG) 25 mg tablet    cholecalciferol (VITAMIN D3) 1,000 units tablet    cinacalcet (SENSIPAR) 30 mg tablet    cloNIDine (CATAPRES) 0 1 mg tablet    cyclobenzaprine (FLEXERIL) 10 mg tablet    diclofenac sodium (VOLTAREN) 1 %    diphenhydrAMINE (BENADRYL) 25 mg capsule    dorzolamide-timolol (COSOPT) 22 3-6 8 MG/ML ophthalmic solution    Ferric Citrate (AURYXIA) 1  MG(Fe) TABS    gabapentin (NEURONTIN) 100 mg capsule    insulin glargine (LANTUS) 100 units/mL subcutaneous injection    KIONEX 15 GM/60ML suspension    latanoprost (XALATAN) 0 005 % ophthalmic solution    levothyroxine 50 mcg tablet    LIDOCAINE EX    loratadine (CLARITIN) 10 mg tablet    losartan (COZAAR) 25 mg tablet    methazolamide (NEPTAZANE) 25 MG tablet    ondansetron (ZOFRAN) 4 mg tablet    pantoprazole (PROTONIX) 40 mg tablet    prednisoLONE acetate (PRED FORTE) 1 % ophthalmic suspension    sertraline (ZOLOFT) 50 mg tablet    TIMOLOL MALEATE OP    torsemide (DEMADEX) 100 mg tablet    VIGAMOX 0 5 % ophthalmic solution    warfarin (COUMADIN) 2 5 mg tablet    warfarin (COUMADIN) 7 5 mg tablet    butalbital-acetaminophen-caffeine (FIORICET,ESGIC) -40 mg per tablet    Melatonin 3 MG CAPS     Allergies   Allergen Reactions    Iodinated Diagnostic Agents Itching       Objective   First Vitals:   Blood Pressure: 113/55 (08/26/19 1029)  Pulse: 73 (08/26/19 1029)  Temperature: 97 9 °F (36 6 °C) (08/26/19 1029)  Temp Source: Oral (08/26/19 1029)  Respirations: 18 (08/26/19 1029)  Height: 5' 5 98" (167 6 cm) (08/26/19 1504)  Weight - Scale: 125 kg (275 lb 5 7 oz) (08/26/19 1029)  SpO2: 96 % (08/26/19 1029)    Current Vitals:   Blood Pressure: 140/60 (08/27/19 1700)  Pulse: 74 (08/27/19 1500)  Temperature: 98 1 °F (36 7 °C) (08/27/19 1500)  Temp Source: Oral (08/27/19 1500)  Respirations: 16 (08/27/19 1500)  Height: 5' 5 98" (167 6 cm) (08/26/19 1504)  Weight - Scale: 126 kg (276 lb 14 4 oz) (08/26/19 1817)  SpO2: 93 % (08/27/19 1500)        /60   Pulse 74   Temp 98 1 °F (36 7 °C) (Oral)   Resp 16   Ht 5' 5 98" (1 676 m)   Wt 126 kg (276 lb 14 4 oz)   LMP 02/12/2016   SpO2 93%   BMI 44 71 kg/m²      General Appearance:    Alert, cooperative, no distress   Head:    Normocephalic, without obvious abnormality, atraumatic   Eyes:    PERRL, conjunctiva/corneas clear      Nose:   Moist mucous membranes   Neck:   Supple, symmetrical, trachea midline   Back:     Symmetric   Lungs:     Respirations unlabored   Heart:    Regular rate and rhythm   Abdomen:     Soft, non-tender   Extremities:   MMT is 5/5 to all compartments of the LE, +0/4 edema B/L, No pain on palpation noted bilaterally  No calf tenderness noted bilaterally  Pulses:   Pedal pulses are palpable B/l, CFT< 3sec to all digits  Pedal hair is Absent   Skin: Ulcer of the left 5th digit  Measures 0 7 centimeters in diameter  Purulence was expressed at the time of examination  No surrounding erythema is noted at this time  Left posterior heel wound non erythematous  Small heel fissure measuring approximately 0 3 cm in length  Neurologic:   Gross sensation is Intact  Protective sensation is Intact  Lab, Imaging and other studies:   I have personally reviewed pertinent lab results    , CBC:   Lab Results   Component Value Date    WBC 5 44 08/27/2019    HGB 7 7 (L) 08/27/2019    HCT 24 4 (L) 08/27/2019    MCV 96 08/27/2019     (L) 08/27/2019    MCH 30 4 08/27/2019    MCHC 31 6 08/27/2019    RDW 13 9 08/27/2019    MPV 11 3 08/27/2019 NRBC 0 08/27/2019   , CMP:   Lab Results   Component Value Date    SODIUM 136 08/27/2019    K 6 3 (HH) 08/27/2019     08/27/2019    CO2 26 08/27/2019    BUN 88 (H) 08/27/2019    CREATININE 10 20 (H) 08/27/2019    CALCIUM 7 8 (L) 08/27/2019    EGFR 4 08/27/2019   , Coagulation:   Lab Results   Component Value Date    INR 9 36 ([place]) 08/27/2019         Imaging: I have personally reviewed pertinent films in PACS  EKG, Pathology, and Other Studies: I have personally reviewed pertinent reports  Portions of the record may have been created with voice recognition software  Occasional wrong word or "sound a like" substitutions may have occurred due to the inherent limitations of voice recognition software  Read the chart carefully and recognize, using context, where substitutions have occurred

## 2019-08-27 NOTE — HEMODIALYSIS
Stable 3 75 hours HD treatment  L forearm fistula cannulated without problems, BFR maintained at 400 throughout tx  Patient received vanco last hour of tx per order  Vanco level 21 4  Patient primary nurse [place] Basket aware to give cefepime       Pre weight 125 8kg  Post weight 121 7kg  UF removed 6575

## 2019-08-28 LAB
ALBUMIN SERPL BCP-MCNC: 2.6 G/DL (ref 3.5–5)
ALP SERPL-CCNC: 223 U/L (ref 46–116)
ALT SERPL W P-5'-P-CCNC: 20 U/L (ref 12–78)
ANION GAP SERPL CALCULATED.3IONS-SCNC: 7 MMOL/L (ref 4–13)
AST SERPL W P-5'-P-CCNC: 16 U/L (ref 5–45)
BASOPHILS # BLD AUTO: 0.03 THOUSANDS/ΜL (ref 0–0.1)
BASOPHILS NFR BLD AUTO: 1 % (ref 0–1)
BILIRUB SERPL-MCNC: 0.37 MG/DL (ref 0.2–1)
BUN SERPL-MCNC: 44 MG/DL (ref 5–25)
CALCIUM SERPL-MCNC: 7.7 MG/DL (ref 8.3–10.1)
CHLORIDE SERPL-SCNC: 95 MMOL/L (ref 100–108)
CO2 SERPL-SCNC: 29 MMOL/L (ref 21–32)
CREAT SERPL-MCNC: 6.8 MG/DL (ref 0.6–1.3)
EOSINOPHIL # BLD AUTO: 0.21 THOUSAND/ΜL (ref 0–0.61)
EOSINOPHIL NFR BLD AUTO: 3 % (ref 0–6)
ERYTHROCYTE [DISTWIDTH] IN BLOOD BY AUTOMATED COUNT: 14.3 % (ref 11.6–15.1)
GFR SERPL CREATININE-BSD FRML MDRD: 6 ML/MIN/1.73SQ M
GLUCOSE SERPL-MCNC: 133 MG/DL (ref 65–140)
GLUCOSE SERPL-MCNC: 142 MG/DL (ref 65–140)
GLUCOSE SERPL-MCNC: 165 MG/DL (ref 65–140)
GLUCOSE SERPL-MCNC: 181 MG/DL (ref 65–140)
GLUCOSE SERPL-MCNC: 193 MG/DL (ref 65–140)
HCT VFR BLD AUTO: 26.1 % (ref 34.8–46.1)
HGB BLD-MCNC: 8.3 G/DL (ref 11.5–15.4)
IMM GRANULOCYTES # BLD AUTO: 0.04 THOUSAND/UL (ref 0–0.2)
IMM GRANULOCYTES NFR BLD AUTO: 1 % (ref 0–2)
INR PPP: 4.22 (ref 0.84–1.19)
LYMPHOCYTES # BLD AUTO: 1.19 THOUSANDS/ΜL (ref 0.6–4.47)
LYMPHOCYTES NFR BLD AUTO: 19 % (ref 14–44)
MCH RBC QN AUTO: 30.6 PG (ref 26.8–34.3)
MCHC RBC AUTO-ENTMCNC: 31.8 G/DL (ref 31.4–37.4)
MCV RBC AUTO: 96 FL (ref 82–98)
MONOCYTES # BLD AUTO: 0.52 THOUSAND/ΜL (ref 0.17–1.22)
MONOCYTES NFR BLD AUTO: 8 % (ref 4–12)
NEUTROPHILS # BLD AUTO: 4.33 THOUSANDS/ΜL (ref 1.85–7.62)
NEUTS SEG NFR BLD AUTO: 68 % (ref 43–75)
NRBC BLD AUTO-RTO: 0 /100 WBCS
PLATELET # BLD AUTO: 145 THOUSANDS/UL (ref 149–390)
PMV BLD AUTO: 11.2 FL (ref 8.9–12.7)
POTASSIUM SERPL-SCNC: 4.8 MMOL/L (ref 3.5–5.3)
PROT SERPL-MCNC: 6.4 G/DL (ref 6.4–8.2)
PROTHROMBIN TIME: 40.1 SECONDS (ref 11.6–14.5)
RBC # BLD AUTO: 2.71 MILLION/UL (ref 3.81–5.12)
SODIUM SERPL-SCNC: 131 MMOL/L (ref 136–145)
WBC # BLD AUTO: 6.32 THOUSAND/UL (ref 4.31–10.16)

## 2019-08-28 PROCEDURE — 85610 PROTHROMBIN TIME: CPT | Performed by: PHYSICIAN ASSISTANT

## 2019-08-28 PROCEDURE — 85025 COMPLETE CBC W/AUTO DIFF WBC: CPT | Performed by: PHYSICIAN ASSISTANT

## 2019-08-28 PROCEDURE — 99233 SBSQ HOSP IP/OBS HIGH 50: CPT | Performed by: INTERNAL MEDICINE

## 2019-08-28 PROCEDURE — 80053 COMPREHEN METABOLIC PANEL: CPT | Performed by: PHYSICIAN ASSISTANT

## 2019-08-28 PROCEDURE — 82948 REAGENT STRIP/BLOOD GLUCOSE: CPT

## 2019-08-28 PROCEDURE — 99232 SBSQ HOSP IP/OBS MODERATE 35: CPT | Performed by: FAMILY MEDICINE

## 2019-08-28 RX ADMIN — LATANOPROST 1 DROP: 50 SOLUTION OPHTHALMIC at 21:40

## 2019-08-28 RX ADMIN — VITAMIN D, TAB 1000IU (100/BT) 2000 UNITS: 25 TAB at 09:12

## 2019-08-28 RX ADMIN — TIMOLOL MALEATE 1 DROP: 2.5 SOLUTION OPHTHALMIC at 09:14

## 2019-08-28 RX ADMIN — INSULIN LISPRO 1 UNITS: 100 INJECTION, SOLUTION INTRAVENOUS; SUBCUTANEOUS at 07:42

## 2019-08-28 RX ADMIN — TORSEMIDE 200 MG: 20 TABLET ORAL at 05:21

## 2019-08-28 RX ADMIN — ATORVASTATIN CALCIUM 20 MG: 20 TABLET, FILM COATED ORAL at 17:19

## 2019-08-28 RX ADMIN — SERTRALINE HYDROCHLORIDE 50 MG: 50 TABLET ORAL at 09:12

## 2019-08-28 RX ADMIN — OXYCODONE HYDROCHLORIDE 5 MG: 5 TABLET ORAL at 09:25

## 2019-08-28 RX ADMIN — CINACALCET HYDROCHLORIDE 30 MG: 30 TABLET, FILM COATED ORAL at 09:13

## 2019-08-28 RX ADMIN — PREDNISOLONE ACETATE 1 DROP: 10 SUSPENSION/ DROPS OPHTHALMIC at 17:23

## 2019-08-28 RX ADMIN — LOSARTAN POTASSIUM 25 MG: 25 TABLET, FILM COATED ORAL at 09:12

## 2019-08-28 RX ADMIN — INSULIN LISPRO 4 UNITS: 100 INJECTION, SOLUTION INTRAVENOUS; SUBCUTANEOUS at 11:39

## 2019-08-28 RX ADMIN — CLONIDINE HYDROCHLORIDE 0.1 MG: 0.1 TABLET ORAL at 09:12

## 2019-08-28 RX ADMIN — BRIMONIDINE TARTRATE 1 DROP: 1.5 SOLUTION OPHTHALMIC at 09:14

## 2019-08-28 RX ADMIN — INSULIN LISPRO 4 UNITS: 100 INJECTION, SOLUTION INTRAVENOUS; SUBCUTANEOUS at 17:22

## 2019-08-28 RX ADMIN — LEVOTHYROXINE SODIUM 50 MCG: 50 TABLET ORAL at 05:21

## 2019-08-28 RX ADMIN — PANTOPRAZOLE SODIUM 40 MG: 40 TABLET, DELAYED RELEASE ORAL at 05:21

## 2019-08-28 RX ADMIN — MELATONIN 3 MG: 3 TAB ORAL at 21:40

## 2019-08-28 RX ADMIN — PREDNISOLONE ACETATE 1 DROP: 10 SUSPENSION/ DROPS OPHTHALMIC at 21:40

## 2019-08-28 RX ADMIN — GABAPENTIN 100 MG: 100 CAPSULE ORAL at 09:12

## 2019-08-28 RX ADMIN — CLONIDINE HYDROCHLORIDE 0.1 MG: 0.1 TABLET ORAL at 17:19

## 2019-08-28 RX ADMIN — GABAPENTIN 100 MG: 100 CAPSULE ORAL at 16:18

## 2019-08-28 RX ADMIN — LORATADINE 10 MG: 10 TABLET ORAL at 16:18

## 2019-08-28 RX ADMIN — CYCLOBENZAPRINE HYDROCHLORIDE 10 MG: 10 TABLET, FILM COATED ORAL at 21:40

## 2019-08-28 RX ADMIN — OXYCODONE HYDROCHLORIDE 5 MG: 5 TABLET ORAL at 19:48

## 2019-08-28 RX ADMIN — INSULIN GLARGINE 28 UNITS: 100 INJECTION, SOLUTION SUBCUTANEOUS at 09:13

## 2019-08-28 RX ADMIN — BRIMONIDINE TARTRATE 1 DROP: 1.5 SOLUTION OPHTHALMIC at 17:23

## 2019-08-28 RX ADMIN — TIMOLOL MALEATE 1 DROP: 2.5 SOLUTION OPHTHALMIC at 17:23

## 2019-08-28 RX ADMIN — GABAPENTIN 100 MG: 100 CAPSULE ORAL at 21:40

## 2019-08-28 RX ADMIN — INSULIN LISPRO 1 UNITS: 100 INJECTION, SOLUTION INTRAVENOUS; SUBCUTANEOUS at 17:22

## 2019-08-28 RX ADMIN — Medication 1 CAPSULE: at 16:18

## 2019-08-28 RX ADMIN — INSULIN LISPRO 4 UNITS: 100 INJECTION, SOLUTION INTRAVENOUS; SUBCUTANEOUS at 07:43

## 2019-08-28 RX ADMIN — PREDNISOLONE ACETATE 1 DROP: 10 SUSPENSION/ DROPS OPHTHALMIC at 11:38

## 2019-08-28 RX ADMIN — CARVEDILOL 25 MG: 25 TABLET, FILM COATED ORAL at 09:12

## 2019-08-28 RX ADMIN — PREDNISOLONE ACETATE 1 DROP: 10 SUSPENSION/ DROPS OPHTHALMIC at 09:14

## 2019-08-28 NOTE — ASSESSMENT & PLAN NOTE
As mentioned in DVT    Continue to hold Coumadin and monitor INR  INR is improving, hold Coumadin today

## 2019-08-28 NOTE — PROGRESS NOTES
NEPHROLOGY PROGRESS NOTE   Ronnie Calderón 48 y o  female MRN: [id number]  Unit/Bed#: [facility] 510-01 Encounter: [id number]      ASSESSMENT/PLAN:  1  End-stage renal disease: On hemodialysis Tuesday, Thursday and Saturday at [address]  ? Currently appears stable from a dialysis standpoint and next treatment will be tomorrow  ? Access:  Left upper extremity AV fistula with good bruit and thrill  ? Complains of prolonged bleeding after HD but has supratherapeutic INR  If no improvement with normalizing INR would check fistulogram (can be done outpatient)  2  Left heel wound:  Currently on vancomycin  Podiatry on board   ? Blood cultures negative times 24 hours  3  Hyperkalemia:  Potassium 5 8 on admission due to ESRD and hyperglycemia  Potassium has improved with dialysis  ? Low-potassium diet  ? Dialysis tomorrow  4  Mineral bone disease of CKD:  Continue Hectorol 1 mcg Q HD, Sensipar 30 mg daily and Renvela 3 tablets t i d  With meals  5  Anemia of CKD:  Hemoglobin 8 3 today  ? Not on Epogen due to history of PE/DVT  ? Hold Venofer due to infection  6  Hyponatremia:  Sodium 131 today  Continue fluid restriction and volume with dialysis  7  Hypertension: BP acceptable today        SUBJECTIVE:  Patient complains of pain in her foot but otherwise is feeling fine  She denies any chest pain, shortness of breath, nausea, vomiting or diarrhea      OBJECTIVE:  Current Weight: Weight - Scale: 126 kg (276 lb 14 4 oz)  Vitals:    08/28/19 0717   BP: 132/79   Pulse: 79   Resp: 18   Temp: 98 °F (36 7 °C)   SpO2: 94%       Intake/Output Summary (Last 24 hours) at 8/28/2019 1021  Last data filed at 8/28/2019 0210  Gross per 24 hour   Intake 1340 ml   Output 4460 ml   Net -3120 ml       General:  No acute distress  Skin:  No rash  Eyes:  Sclerae anicteric  ENT:  Moist mucous membranes  Neck:  Supple, symmetric  Chest:  Clear to auscultation bilaterally   CVS:  Rate and rhythm  Abdomen:  Soft, obese, nontender  Extremities: No edema   Neuro:  Awake and alert  Psych:  Appropriate affect      Medications:  Scheduled Meds:  Current Facility-Administered Medications:  acetaminophen 650 mg Oral Q6H PRN Zaid Mullins MD    ALPRAZolam 0 25 mg Oral BID PRN Zaid Mullins MD    atorvastatin 20 mg Oral QPM Zaid Mullins MD    b complex-vitamin C-folic acid 1 capsule Oral Daily With Mirta Severino MD    brimonidine 1 drop Both Eyes BID Zaid Mullins MD    carvedilol 25 mg Oral BID With Meals Zaid Mullins MD    cholecalciferol 2,000 Units Oral Daily Zaid Mullins MD    cinacalcet 30 mg Oral Daily Zaid Mullins MD    cloNIDine 0 1 mg Oral BID Zaid Mullins MD    cyclobenzaprine 10 mg Oral HS Zaid Mullins MD    doxercalciferol 1 mcg Intravenous After Dialysis Renay Bhatti PA-C    gabapentin 100 mg Oral TID Zaid Mullins MD    insulin glargine 28 Units Subcutaneous QAM Zaid Mullins MD    insulin lispro 1-5 Units Subcutaneous TID [facility] Zaid Mullins MD    insulin lispro 1-5 Units Subcutaneous HS Zaid Mullins MD    insulin lispro 4 Units Subcutaneous TID With Meals Keesha MILLAN PA-C    latanoprost 1 drop Both Eyes HS Zaid Mullins MD    levothyroxine 50 mcg Oral Early Morning Zaid Mullins MD    loratadine 10 mg Oral Q48H Zaid Mullins MD    losartan 25 mg Oral Daily Zaid Mullins MD    melatonin 3 mg Oral HS Zaid Mullins MD    oxyCODONE 10 mg Oral Q6H PRN Zaid Mullins MD    oxyCODONE 5 mg Oral Q6H PRN Zaid Mullins MD    pantoprazole 40 mg Oral Daily Zaid Mullins MD    prednisoLONE acetate 1 drop Both Eyes 4x Daily Zaid Mullins MD    sertraline 50 mg Oral Daily Zaid Mullins MD    timolol 1 drop Ophthalmic BID Zaid Mullins MD    torsemide 200 mg Oral Q12H Zaid Mullins MD    vancomycin 10 mg/kg (Adjusted) Intravenous Daily PRN Zaid Mullins MD Last Rate: Stopped (08/27/19 1200)       PRN Meds:   acetaminophen    ALPRAZolam    doxercalciferol    oxyCODONE    oxyCODONE    vancomycin    Laboratory Results:  Results from last 7 days   Lab Units 08/28/19  0712 08/28/19  0605 08/27/19  0556 08/26/19  1154   WBC Thousand/uL  --  6 32 5 44 5 77   HEMOGLOBIN g/dL  --  8 3* 7 7* 8 3*   HEMATOCRIT %  --  26 1* 24 4* 25 3*   PLATELETS Thousands/uL  --  145* 130* 136*   SODIUM mmol/L 131*  --  136 132*   POTASSIUM mmol/L 4 8  --  6 3* 5 8*   CHLORIDE mmol/L 95*  --  100 97*   CO2 mmol/L 29  --  26 27   BUN mg/dL 44*  --  88* 58*   CREATININE mg/dL 6 80*  --  10 20* 9 26*   CALCIUM mg/dL 7 7*  --  7 8* 7 9*          Radiology Results:   XR foot 3+ vw left   Final Result by Wade Matt MD (08/28 0309)      Soft tissue swelling in the region of the 5th metatarsophalangeal joint without radiographic evidence of osteomyelitis  Workstation performed: [id number]         XR ankle 3+ views LEFT   Final Result by Whitney Mack DO (08/26 1300)      No acute osseous abnormality              Workstation performed: [id number]

## 2019-08-28 NOTE — ASSESSMENT & PLAN NOTE
Lab Results   Component Value Date    HGBA1C 10 4 (H) 08/27/2019       Recent Labs     08/27/19  2055 08/28/19  0654 08/28/19  1059 08/28/19  1648   POCGLU 232* 193* 133 165*       Blood Sugar Average: Last 72 hrs:  (P) 215 [id number]   Not well controlled as noted by HbA1c  On 28 units Lantus at home  Continue basal insulin  Insulin sliding scale and Accu-Cheks    HbA1c elevated, will add meal coverage

## 2019-08-28 NOTE — PLAN OF CARE
Problem: Potential for Falls  Goal: Patient will remain free of falls  Description  INTERVENTIONS:  - Assess patient frequently for physical needs  -  Identify cognitive and physical deficits and behaviors that affect risk of falls    -  [place] fall precautions as indicated by assessment   - Educate patient/family on patient safety including physical limitations  - Instruct patient to call for assistance with activity based on assessment  - Modify environment to reduce risk of injury  - Consider OT/PT consult to assist with strengthening/mobility  Outcome: Progressing     Problem: PAIN - ADULT  Goal: Verbalizes/displays adequate comfort level or baseline comfort level  Description  Interventions:  - Encourage patient to monitor pain and request assistance  - Assess pain using appropriate pain scale  - Administer analgesics based on type and severity of pain and evaluate response  - Implement non-pharmacological measures as appropriate and evaluate response  - Consider cultural and social influences on pain and pain management  - Notify physician/advanced practitioner if interventions unsuccessful or patient reports new pain  Outcome: Progressing     Problem: METABOLIC, FLUID AND ELECTROLYTES - ADULT  Goal: Electrolytes maintained within normal limits  Description  INTERVENTIONS:  - Monitor labs and assess patient for signs and symptoms of electrolyte imbalances  - Administer electrolyte replacement as ordered  - Monitor response to electrolyte replacements, including repeat lab results as appropriate  - Instruct patient on fluid and nutrition as appropriate  Outcome: Progressing  Goal: Fluid balance maintained  Description  INTERVENTIONS:  - Monitor labs   - Monitor I/O and WT  - Instruct patient on fluid and nutrition as appropriate  - Assess for signs & symptoms of volume excess or deficit  Outcome: Progressing  Goal: Glucose maintained within target range  Description  INTERVENTIONS:  - Monitor Blood Glucose as ordered  - Assess for signs and symptoms of hyperglycemia and hypoglycemia  - Administer ordered medications to maintain glucose within target range  - Assess nutritional intake and initiate nutrition service referral as needed  Outcome: Progressing     Problem: CARDIOVASCULAR - ADULT  Goal: Maintains optimal cardiac output and hemodynamic stability  Description  INTERVENTIONS:  - Monitor I/O, vital signs and rhythm  - Monitor for S/S and trends of decreased cardiac output  - Administer and titrate ordered vasoactive medications to optimize hemodynamic stability  - Assess quality of pulses, skin color and temperature  - Assess for signs of decreased coronary artery perfusion  - Instruct patient to report change in severity of symptoms  Outcome: Progressing  Goal: Absence of cardiac dysrhythmias or at baseline rhythm  Description  INTERVENTIONS:  - Continuous cardiac monitoring, vital signs, obtain 12 lead EKG if ordered  - Administer antiarrhythmic and heart rate control medications as ordered  - Monitor electrolytes and administer replacement therapy as ordered  Outcome: Progressing     Problem: SKIN/TISSUE INTEGRITY - ADULT  Goal: Skin integrity remains intact  Description  INTERVENTIONS  - Identify patients at risk for skin breakdown  - Assess and monitor skin integrity  - Assess and monitor nutrition and hydration status  - Monitor labs (i e  albumin)  - Assess for incontinence   - Turn and reposition patient  - Assist with mobility/ambulation  - Relieve pressure over bony prominences  - Avoid friction and shearing  - Provide appropriate hygiene as needed including keeping skin clean and dry  - Evaluate need for skin moisturizer/barrier cream  - Collaborate with interdisciplinary team (i e  Nutrition, Rehabilitation, etc )   - Patient/family teaching  Outcome: Progressing  Goal: Incision(s), wounds(s) or drain site(s) healing without S/S of infection  Description  INTERVENTIONS  - Assess and document risk factors for skin impairment   - Assess and document dressing, incision, wound bed, drain sites and surrounding tissue  - Consider nutrition services referral as needed  - Oral mucous membranes remain intact  - Provide patient/ family education  Outcome: Progressing  Goal: Oral mucous membranes remain intact  Description  INTERVENTIONS  - Assess oral mucosa and hygiene practices  - Implement preventative oral hygiene regimen  - Implement oral medicated treatments as ordered  - Initiate Nutrition services referral as needed  Outcome: Progressing     Problem: MUSCULOSKELETAL - ADULT  Goal: Maintain or return mobility to safest level of function  Description  INTERVENTIONS:  - Assess patient's ability to carry out ADLs; assess patient's baseline for ADL function and identify physical deficits which impact ability to perform ADLs (bathing, care of mouth/teeth, toileting, grooming, dressing, etc )  - Assess/evaluate cause of self-care deficits   - Assess range of motion  - Assess patient's mobility  - Assess patient's need for assistive devices and provide as appropriate  - Encourage maximum independence but intervene and supervise when necessary  - Involve family in performance of ADLs  - Assess for home care needs following discharge   - Consider OT consult to assist with ADL evaluation and planning for discharge  - Provide patient education as appropriate  Outcome: Progressing  Goal: Maintain proper alignment of affected body part  Description  INTERVENTIONS:  - Support, maintain and protect limb and body alignment  - Provide patient/ family with appropriate education  Outcome: Progressing     Problem: INFECTION - ADULT  Goal: Absence or prevention of progression during hospitalization  Description  INTERVENTIONS:  - Assess and monitor for signs and symptoms of infection  - Monitor lab/diagnostic results  - Monitor all insertion sites, i e  indwelling lines, tubes, and drains  - Monitor endotracheal if appropriate and nasal secretions for changes in amount and color  - Concrete appropriate cooling/warming therapies per order  - Administer medications as ordered  - Instruct and encourage patient and family to use good hand hygiene technique  - Identify and instruct in appropriate isolation precautions for identified infection/condition  Outcome: Progressing  Goal: Absence of fever/infection during neutropenic period  Description  INTERVENTIONS:  - Monitor WBC    Outcome: Progressing     Problem: SAFETY ADULT  Goal: Maintain or return to baseline ADL function  Description  INTERVENTIONS:  -  Assess patient's ability to carry out ADLs; assess patient's baseline for ADL function and identify physical deficits which impact ability to perform ADLs (bathing, care of mouth/teeth, toileting, grooming, dressing, etc )  - Assess/evaluate cause of self-care deficits   - Assess range of motion  - Assess patient's mobility; develop plan if impaired  - Assess patient's need for assistive devices and provide as appropriate  - Encourage maximum independence but intervene and supervise when necessary  - Involve family in performance of ADLs  - Assess for home care needs following discharge   - Consider OT consult to assist with ADL evaluation and planning for discharge  - Provide patient education as appropriate  Outcome: Progressing  Goal: Maintain or return mobility status to optimal level  Description  INTERVENTIONS:  - Assess patient's baseline mobility status (ambulation, transfers, stairs, etc )    - Identify cognitive and physical deficits and behaviors that affect mobility  - Identify mobility aids required to assist with transfers and/or ambulation (gait belt, sit-to-stand, lift, walker, cane, etc )  - [place] fall precautions as indicated by assessment  - Record patient progress and toleration of activity level on Mobility SBAR; progress patient to next Phase/Stage  - Instruct patient to call for assistance with activity based on assessment  - Consider rehabilitation consult to assist with strengthening/weightbearing, etc   Outcome: Progressing

## 2019-08-28 NOTE — ASSESSMENT & PLAN NOTE
· POA  · No fever or leukocytosis  Elevated CRP noted  · X-ray obtain in ER, shows No acute osseous abnormality  · Left heel wound/ ulcer with pus like discharge and significant tenderness  · Started on vancomycin, cefepime and Flagyl in ER, discontinue cefepime and Flagyl per Podiatry recommendations, continue vancomycin  · Podiatry consulted, status post wound debridement    X-ray is negative for any acute osteomyelitis  · Sed rate elevated at 81  · Blood cultures pending-no growth at 48 hours  · Wound cultures pending-wound culture is growing Staph and strep

## 2019-08-28 NOTE — PROGRESS NOTES
Vancomycin IV Pharmacy-to-Dose Consultation    Herber Almanza is a 48 y o  female who is currently receiving Vancomycin IV with management by the Pharmacy Consult service  Relevant clinical data and objective history reviewed:  Temp Readings from Last 3 Encounters:   08/28/19 98 9 °F (37 2 °C)   05/19/19 97 5 °F (36 4 °C)   05/01/19 (!) 96 5 °F (35 8 °C) (Tympanic)     BP 92/50 (BP Location: Right arm)   Pulse 80   Temp 98 9 °F (37 2 °C)   Resp 18   Ht 5' 5 98" (1 676 m)   Wt 126 kg (276 lb 14 4 oz)   LMP 02/12/2016   SpO2 94%   BMI 44 71 kg/m²     I/O last 3 completed shifts: In: 2569 [P O :1370; I V :400]  Out: 4460 [Other:4460]    Lab Results   Component Value Date/Time    BUN 44 (H) 08/28/2019 07:12 AM    BUN 64 (H) 10/11/2015 06:08 AM    WBC 6 32 08/28/2019 06:05 AM    WBC 8 45 10/11/2015 06:14 AM    HGB 8 3 (L) 08/28/2019 06:05 AM    HGB 8 7 (L) 10/11/2015 06:14 AM    HCT 26 1 (L) 08/28/2019 06:05 AM    HCT 26 3 (L) 10/11/2015 06:14 AM    MCV 96 08/28/2019 06:05 AM    MCV 91 10/11/2015 06:14 AM     (L) 08/28/2019 06:05 AM     10/11/2015 06:14 AM     Creatinine   Date Value Ref Range Status   08/28/2019 6 80 (H) 0 60 - 1 30 mg/dL Final     Comment:     Standardized to IDMS reference method   08/27/2019 10 20 (H) 0 60 - 1 30 mg/dL Final     Comment:     Standardized to IDMS reference method   10/11/2015 6 68 (H) 0 60 - 1 30 mg/dL Final     Comment:     Standardized to IDMS reference method   10/08/2015 9 09 (H) 0 60 - 1 30 mg/dL Final     Comment:     Standardized to IDMS reference method     VANCOMYCIN TROUGH   Date Value Ref Range Status   05/25/2014 32 1 (HH) 10 0 - 20 0 mcg/mL Final     Comment:     'Results called and read back:  Dania Mo JOSE ANTONIO/[facility] 2035  Verify that this specimen was collected immediately prior to drug  administration  Reference range and result flagging reflect proper  specimen collection protocol    The above 1 analytes were performed by [facility] MOISÉS GREEN PA [address]   Date Value Ref Range Status   05/26/2014 30 1 mcg/mL Final     Comment:     The above 1 analytes were performed by MOISÉS [place] [id number]       Vancomycin Rm   Date Value Ref Range Status   08/27/2019 21 4 ug/mL Final   08/10/2017 20 0 ug/mL Final         Vancomycin Assessment:  Indication: skin-soft tissue infection    Renal Function: Dialysis   Potential Nephrotoxicity Factors:  Medications: diuretics  Patient-Factors:renal dysfunction  Days of Therapy: 3  Current Dose: 750mg (10mg/kg using adj wt) when pre HD level <25 (last dose: 8/27 @1055)  Goal Trough: 15-20 (appropriate for most indications)   Goal AUC(24h): 400-600  Last Level: random level 8/27 @0800      Vancomycin Plan:  New Dosing: continue 750mg (10mg/kg using adj wt) when pre HD level <25   Next Level: 8/29 @0600      Pharmacy will continue to follow closely for s/sx of nephrotoxicity, infusion reactions and appropriateness of therapy  BMP and CBC will be ordered per protocol  We will continue to follow the patient's culture results and clinical progress daily         Albert Aldrich PharmD  Pharmacist

## 2019-08-28 NOTE — ASSESSMENT & PLAN NOTE
Patient was hyperkalemic yesterday today potassium is 4 8 after dialysis  EKG reveals normal sinus rhythm with no T-wave abnormalities  Monitor on telemetry    Received hemodialysis 8/27  Nephrology consulted   2K bath with dialysis and low-potassium diet

## 2019-08-28 NOTE — ASSESSMENT & PLAN NOTE
On Tuesday Thursday Saturday schedule  Last dialysis on Saturday which was cut short due to her foot pain per patient    Potassium moderately elevated at 6 3 prior to dialysis  Nephrology following  Received dialysis treatment with 2K bath yesterday  Continue to monitor

## 2019-08-28 NOTE — PROGRESS NOTES
Progress Note Zhen Snyder 1965, 48 y o  female MRN: [id number]    Unit/Bed#: [facility] 510-01 Encounter: [id number]    Primary Care Provider: Vladimir Ramirez MD   Date and time admitted to hospital: 8/26/2019 10:23 AM        Hyperkalemia  Assessment & Plan  Patient was hyperkalemic yesterday today potassium is 4 8 after dialysis  EKG reveals normal sinus rhythm with no T-wave abnormalities  Monitor on telemetry  Received hemodialysis 8/27  Nephrology consulted   2K bath with dialysis and low-potassium diet    Supratherapeutic INR  Assessment & Plan  As mentioned in DVT  Continue to hold Coumadin and monitor INR  INR is improving, hold Coumadin today    Anemia in end-stage renal disease (HCC)  Assessment & Plan  Baseline hemoglobin seems between 8-10  Hemoglobin at baseline  Trend CBC  Type 2 diabetes mellitus [facility])  Assessment & Plan  Lab Results   Component Value Date    HGBA1C 10 4 (H) 08/27/2019       Recent Labs     08/27/19  2055 08/28/19  0654 08/28/19  1059 08/28/19  1648   POCGLU 232* 193* 133 165*       Blood Sugar Average: Last 72 hrs:  (P) 215 [id number]   Not well controlled as noted by HbA1c  On 28 units Lantus at home  Continue basal insulin  Insulin sliding scale and Accu-Cheks  HbA1c elevated, will add meal coverage    ESRD on hemodialysis [facility])  Assessment & Plan  On Tuesday Thursday Saturday schedule  Last dialysis on Saturday which was cut short due to her foot pain per patient  Potassium moderately elevated at 6 3 prior to dialysis  Nephrology following  Received dialysis treatment with 2K bath yesterday  Continue to monitor    History of DVT (deep vein thrombosis)  Assessment & Plan  · On Coumadin  · INR in ER which is elevated at 7   · No active signs of bleeding  · Hemoglobin 7 7 which is her baseline  · INR is improving down to 4 2 from 9 3 yesterday      * Diabetic foot ulcer (HCC)  Assessment & Plan  · POA  · No fever or leukocytosis  Elevated CRP noted    · X-ray obtain in ER, shows No acute osseous abnormality  · Left heel wound/ ulcer with pus like discharge and significant tenderness  · Started on vancomycin, cefepime and Flagyl in ER, discontinue cefepime and Flagyl per Podiatry recommendations, continue vancomycin  · Podiatry consulted, status post wound debridement  X-ray is negative for any acute osteomyelitis  · Sed rate elevated at 81  · Blood cultures pending-no growth at 48 hours  · Wound cultures pending-wound culture is growing Staph and strep          VTE Pharmacologic Prophylaxis:   Pharmacologic: Warfarin (Coumadin)  Mechanical VTE Prophylaxis in Place: Yes    Patient Centered Rounds: I have performed bedside rounds with nursing staff today  Discussions with Specialists or Other Care Team Provider:     Education and Discussions with Family / Patient:  Patient    Time Spent for Care: 30 minutes  More than 50% of total time spent on counseling and coordination of care as described above  Current Length of Stay: 2 day(s)    Current Patient Status: Inpatient   Certification Statement: The patient will continue to require additional inpatient hospital stay due to IV antibiotics    Discharge Plan:     Code Status: Level 1 - Full Code      Subjective:   Patient seen and examined  Still with pain in the Lower extremity surgical site    Objective:     Vitals:   Temp (24hrs), Av 2 °F (36 8 °C), Min:97 5 °F (36 4 °C), Max:98 9 °F (37 2 °C)    Temp:  [97 5 °F (36 4 °C)-98 9 °F (37 2 °C)] 97 5 °F (36 4 °C)  HR:  [68-82] 68  Resp:  [18-20] 18  BP: ()/(50-79) 95/50  SpO2:  [92 %-100 %] 92 %  Body mass index is 44 71 kg/m²  Input and Output Summary (last 24 hours): Intake/Output Summary (Last 24 hours) at 2019 1746  Last data filed at 2019 0817  Gross per 24 hour   Intake 1020 ml   Output    Net 1020 ml       Physical Exam:     Physical Exam   Constitutional: She appears well-developed  No distress     HENT:   Head: Normocephalic and atraumatic  Eyes: Pupils are equal, round, and reactive to light  Right eye exhibits no discharge  Neck: No JVD present  Cardiovascular: Normal rate  Pulmonary/Chest: Effort normal    Abdominal: Soft  Musculoskeletal:   Right ankle ulceration site covered with dressing  Right toe wound covered in dressing   Neurological: She is alert  Additional Data:     Labs:    Results from last 7 days   Lab Units 08/28/19  0605   WBC Thousand/uL 6 32   HEMOGLOBIN g/dL 8 3*   HEMATOCRIT % 26 1*   PLATELETS Thousands/uL 145*   NEUTROS PCT % 68   LYMPHS PCT % 19   MONOS PCT % 8   EOS PCT % 3     Results from last 7 days   Lab Units 08/28/19  0712   POTASSIUM mmol/L 4 8   CHLORIDE mmol/L 95*   CO2 mmol/L 29   BUN mg/dL 44*   CREATININE mg/dL 6 80*   CALCIUM mg/dL 7 7*   ALK PHOS U/L 223*   ALT U/L 20   AST U/L 16     Results from last 7 days   Lab Units 08/28/19  0712   INR  4 22*       * I Have Reviewed All Lab Data Listed Above  * Additional Pertinent Lab Tests Reviewed: Brooke 68 Admission Reviewed    Imaging:    Imaging Reports Reviewed Today Include:   Imaging Personally Reviewed by Myself Includes:      Recent Cultures (last 7 days):     Results from last 7 days   Lab Units 08/27/19  1347 08/26/19  1309   BLOOD CULTURE   --  No Growth at 48 hrs  No Growth at 48 hrs     GRAM STAIN RESULT  Rare Polys*  2+ Gram positive cocci in pairs*  --    WOUND CULTURE  2+ Growth of Staphylococcus aureus*  2+ Growth of Beta Hemolytic Streptococcus Group B*  --        Last 24 Hours Medication List:     Current Facility-Administered Medications:  acetaminophen 650 mg Oral Q6H PRN Idania Tapia MD    ALPRAZolam 0 25 mg Oral BID PRN Idania Tapia MD    atorvastatin 20 mg Oral QPM Idania Tapia MD    b complex-vitamin C-folic acid 1 capsule Oral Daily With Alex Berger MD    brimonidine 1 drop Both Eyes BID Idania Tapia MD    carvedilol 25 mg Oral BID With Meals Idania Tapia MD cholecalciferol 2,000 Units Oral Daily Chelsistcarol Clinton MD    cinacalcet 30 mg Oral Daily Chelsistcarol Clinton MD    cloNIDine 0 1 mg Oral BID Chelsistcarol Clinton MD    cyclobenzaprine 10 mg Oral HS Chelsistcarol Clinton MD    doxercalciferol 1 mcg Intravenous After Dialysis Marleny Anguiano PA-C    gabapentin 100 mg Oral TID Chelsistcarol Clinton MD    insulin glargine 28 Units Subcutaneous QAM Chelsistcarol Clinton MD    insulin lispro 1-5 Units Subcutaneous TID [facility] [place] Chelsistcarol Clinton MD    insulin lispro 1-5 Units Subcutaneous HS Chelsistcarol Clinton MD    insulin lispro 4 Units Subcutaneous TID With Meals Keesha MILLAN PA-C    latanoprost 1 drop Both Eyes HS Chelsistcarol Clinton MD    levothyroxine 50 mcg Oral Early Morning Chelsistcarol Clinton MD    loratadine 10 mg Oral Q48H Chelsistcarol Clinton MD    losartan 25 mg Oral Daily Glamiraclestine MD Mary Beth    melatonin 3 mg Oral HS Bryant Clinton MD    oxyCODONE 10 mg Oral Q6H PRN Chelsistcarol Clinton MD    oxyCODONE 5 mg Oral Q6H PRN Chelsistcarol Clinton MD    pantoprazole 40 mg Oral Daily Chelsistcarol Clinton MD    prednisoLONE acetate 1 drop Both Eyes 4x Daily Chelsistcarol Clinton MD    sertraline 50 mg Oral Daily Chelsistcarol Clinton MD    timolol 1 drop Ophthalmic BID Bryant Clinton MD    torsemide 200 mg Oral Q12H Glamiraclestcarol Clinton MD    vancomycin 10 mg/kg (Adjusted) Intravenous Daily PRN Glaimraclestcarol Clinton MD Last Rate: Stopped (08/27/19 1200)        Today, Patient Was Seen By: Imelda Aly MD    ** Please Note: Dictation voice to text software may have been used in the creation of this document   **

## 2019-08-28 NOTE — ASSESSMENT & PLAN NOTE
· On Coumadin  · INR in ER which is elevated at 7   · No active signs of bleeding  · Hemoglobin 7 7 which is her baseline    · INR is improving down to 4 2 from 9 3 yesterday

## 2019-08-29 ENCOUNTER — APPOINTMENT (INPATIENT)
Dept: DIALYSIS | Facility: HOSPITAL | Age: 54
DRG: 623 | End: 2019-08-29
Payer: MEDICARE

## 2019-08-29 ENCOUNTER — APPOINTMENT (INPATIENT)
Dept: RADIOLOGY | Facility: HOSPITAL | Age: 54
DRG: 623 | End: 2019-08-29
Payer: MEDICARE

## 2019-08-29 PROBLEM — I12.9 PARENCHYMAL RENAL HYPERTENSION: Status: ACTIVE | Noted: 2019-08-29

## 2019-08-29 PROBLEM — B37.89 CANDIDIASIS OF BREAST: Status: ACTIVE | Noted: 2019-08-29

## 2019-08-29 PROBLEM — E87.1 HYPONATREMIA: Status: ACTIVE | Noted: 2019-08-29

## 2019-08-29 PROBLEM — R79.1 SUPRATHERAPEUTIC INR: Status: RESOLVED | Noted: 2019-05-17 | Resolved: 2019-08-29

## 2019-08-29 LAB
ALBUMIN SERPL BCP-MCNC: 2.7 G/DL (ref 3.5–5)
ALP SERPL-CCNC: 216 U/L (ref 46–116)
ALT SERPL W P-5'-P-CCNC: 17 U/L (ref 12–78)
ANION GAP SERPL CALCULATED.3IONS-SCNC: 10 MMOL/L (ref 4–13)
AST SERPL W P-5'-P-CCNC: 16 U/L (ref 5–45)
BACTERIA WND AEROBE CULT: ABNORMAL
BACTERIA WND AEROBE CULT: ABNORMAL
BILIRUB SERPL-MCNC: 0.46 MG/DL (ref 0.2–1)
BUN SERPL-MCNC: 71 MG/DL (ref 5–25)
CALCIUM SERPL-MCNC: 7.6 MG/DL (ref 8.3–10.1)
CHLORIDE SERPL-SCNC: 97 MMOL/L (ref 100–108)
CO2 SERPL-SCNC: 27 MMOL/L (ref 21–32)
CREAT SERPL-MCNC: 8.53 MG/DL (ref 0.6–1.3)
ERYTHROCYTE [DISTWIDTH] IN BLOOD BY AUTOMATED COUNT: 14 % (ref 11.6–15.1)
GFR SERPL CREATININE-BSD FRML MDRD: 5 ML/MIN/1.73SQ M
GLUCOSE SERPL-MCNC: 227 MG/DL (ref 65–140)
GLUCOSE SERPL-MCNC: 233 MG/DL (ref 65–140)
GLUCOSE SERPL-MCNC: 241 MG/DL (ref 65–140)
GLUCOSE SERPL-MCNC: 321 MG/DL (ref 65–140)
GLUCOSE SERPL-MCNC: 49 MG/DL (ref 65–140)
GRAM STN SPEC: ABNORMAL
GRAM STN SPEC: ABNORMAL
HCT VFR BLD AUTO: 24.7 % (ref 34.8–46.1)
HGB BLD-MCNC: 8 G/DL (ref 11.5–15.4)
INR PPP: 2.28 (ref 0.84–1.19)
MCH RBC QN AUTO: 31.1 PG (ref 26.8–34.3)
MCHC RBC AUTO-ENTMCNC: 32.4 G/DL (ref 31.4–37.4)
MCV RBC AUTO: 96 FL (ref 82–98)
PLATELET # BLD AUTO: 137 THOUSANDS/UL (ref 149–390)
PMV BLD AUTO: 11.4 FL (ref 8.9–12.7)
POTASSIUM SERPL-SCNC: 5.3 MMOL/L (ref 3.5–5.3)
PROT SERPL-MCNC: 6.2 G/DL (ref 6.4–8.2)
PROTHROMBIN TIME: 24.6 SECONDS (ref 11.6–14.5)
RBC # BLD AUTO: 2.57 MILLION/UL (ref 3.81–5.12)
SODIUM SERPL-SCNC: 134 MMOL/L (ref 136–145)
VANCOMYCIN SERPL-MCNC: 20.7 UG/ML
WBC # BLD AUTO: 5.99 THOUSAND/UL (ref 4.31–10.16)

## 2019-08-29 PROCEDURE — 82948 REAGENT STRIP/BLOOD GLUCOSE: CPT

## 2019-08-29 PROCEDURE — 80202 ASSAY OF VANCOMYCIN: CPT | Performed by: INTERNAL MEDICINE

## 2019-08-29 PROCEDURE — 73718 MRI LOWER EXTREMITY W/O DYE: CPT

## 2019-08-29 PROCEDURE — 5A1D70Z PERFORMANCE OF URINARY FILTRATION, INTERMITTENT, LESS THAN 6 HOURS PER DAY: ICD-10-PCS | Performed by: INTERNAL MEDICINE

## 2019-08-29 PROCEDURE — 85027 COMPLETE CBC AUTOMATED: CPT | Performed by: FAMILY MEDICINE

## 2019-08-29 PROCEDURE — 99232 SBSQ HOSP IP/OBS MODERATE 35: CPT | Performed by: FAMILY MEDICINE

## 2019-08-29 PROCEDURE — 85610 PROTHROMBIN TIME: CPT | Performed by: FAMILY MEDICINE

## 2019-08-29 PROCEDURE — 80053 COMPREHEN METABOLIC PANEL: CPT | Performed by: FAMILY MEDICINE

## 2019-08-29 PROCEDURE — 90935 HEMODIALYSIS ONE EVALUATION: CPT | Performed by: INTERNAL MEDICINE

## 2019-08-29 RX ORDER — INSULIN GLARGINE 100 [IU]/ML
30 INJECTION, SOLUTION SUBCUTANEOUS EVERY MORNING
Status: DISCONTINUED | OUTPATIENT
Start: 2019-08-30 | End: 2019-08-31 | Stop reason: HOSPADM

## 2019-08-29 RX ORDER — NYSTATIN 100000 [USP'U]/G
POWDER TOPICAL 2 TIMES DAILY
Status: DISCONTINUED | OUTPATIENT
Start: 2019-08-29 | End: 2019-08-31 | Stop reason: HOSPADM

## 2019-08-29 RX ADMIN — VITAMIN D, TAB 1000IU (100/BT) 2000 UNITS: 25 TAB at 13:24

## 2019-08-29 RX ADMIN — VANCOMYCIN HYDROCHLORIDE 750 MG: 750 INJECTION, SOLUTION INTRAVENOUS at 11:00

## 2019-08-29 RX ADMIN — PREDNISOLONE ACETATE 1 DROP: 10 SUSPENSION/ DROPS OPHTHALMIC at 13:21

## 2019-08-29 RX ADMIN — BRIMONIDINE TARTRATE 1 DROP: 1.5 SOLUTION OPHTHALMIC at 21:58

## 2019-08-29 RX ADMIN — PANTOPRAZOLE SODIUM 40 MG: 40 TABLET, DELAYED RELEASE ORAL at 06:02

## 2019-08-29 RX ADMIN — INSULIN GLARGINE 28 UNITS: 100 INJECTION, SOLUTION SUBCUTANEOUS at 13:23

## 2019-08-29 RX ADMIN — SERTRALINE HYDROCHLORIDE 50 MG: 50 TABLET ORAL at 13:38

## 2019-08-29 RX ADMIN — CYCLOBENZAPRINE HYDROCHLORIDE 10 MG: 10 TABLET, FILM COATED ORAL at 21:57

## 2019-08-29 RX ADMIN — GABAPENTIN 100 MG: 100 CAPSULE ORAL at 21:56

## 2019-08-29 RX ADMIN — ALPRAZOLAM 0.25 MG: 0.25 TABLET ORAL at 20:28

## 2019-08-29 RX ADMIN — GABAPENTIN 100 MG: 100 CAPSULE ORAL at 13:24

## 2019-08-29 RX ADMIN — Medication 1 CAPSULE: at 17:32

## 2019-08-29 RX ADMIN — LATANOPROST 1 DROP: 50 SOLUTION OPHTHALMIC at 22:05

## 2019-08-29 RX ADMIN — LEVOTHYROXINE SODIUM 50 MCG: 50 TABLET ORAL at 06:02

## 2019-08-29 RX ADMIN — BRIMONIDINE TARTRATE 1 DROP: 1.5 SOLUTION OPHTHALMIC at 13:23

## 2019-08-29 RX ADMIN — MELATONIN 3 MG: 3 TAB ORAL at 21:56

## 2019-08-29 RX ADMIN — ALPRAZOLAM 0.25 MG: 0.25 TABLET ORAL at 13:51

## 2019-08-29 RX ADMIN — INSULIN LISPRO 2 UNITS: 100 INJECTION, SOLUTION INTRAVENOUS; SUBCUTANEOUS at 13:25

## 2019-08-29 RX ADMIN — NYSTATIN: 100000 POWDER TOPICAL at 20:04

## 2019-08-29 RX ADMIN — TORSEMIDE 200 MG: 20 TABLET ORAL at 17:33

## 2019-08-29 RX ADMIN — CINACALCET HYDROCHLORIDE 30 MG: 30 TABLET, FILM COATED ORAL at 13:23

## 2019-08-29 RX ADMIN — DOXERCALCIFEROL 1 MCG: 4 INJECTION, SOLUTION INTRAVENOUS at 08:58

## 2019-08-29 RX ADMIN — TIMOLOL MALEATE 1 DROP: 2.5 SOLUTION OPHTHALMIC at 21:59

## 2019-08-29 RX ADMIN — INSULIN LISPRO 4 UNITS: 100 INJECTION, SOLUTION INTRAVENOUS; SUBCUTANEOUS at 13:27

## 2019-08-29 RX ADMIN — PREDNISOLONE ACETATE 1 DROP: 10 SUSPENSION/ DROPS OPHTHALMIC at 21:58

## 2019-08-29 RX ADMIN — TORSEMIDE 200 MG: 20 TABLET ORAL at 06:03

## 2019-08-29 RX ADMIN — INSULIN LISPRO 3 UNITS: 100 INJECTION, SOLUTION INTRAVENOUS; SUBCUTANEOUS at 17:34

## 2019-08-29 RX ADMIN — ATORVASTATIN CALCIUM 20 MG: 20 TABLET, FILM COATED ORAL at 17:32

## 2019-08-29 RX ADMIN — GABAPENTIN 100 MG: 100 CAPSULE ORAL at 17:32

## 2019-08-29 RX ADMIN — SODIUM CHLORIDE 250 ML: 0.9 INJECTION, SOLUTION INTRAVENOUS at 23:45

## 2019-08-29 RX ADMIN — INSULIN LISPRO 4 UNITS: 100 INJECTION, SOLUTION INTRAVENOUS; SUBCUTANEOUS at 17:35

## 2019-08-29 RX ADMIN — TIMOLOL MALEATE 1 DROP: 2.5 SOLUTION OPHTHALMIC at 13:23

## 2019-08-29 NOTE — PLAN OF CARE
Problem: METABOLIC, FLUID AND ELECTROLYTES - ADULT  Goal: Electrolytes maintained within normal limits  Description  INTERVENTIONS:  - Monitor labs and assess patient for signs and symptoms of electrolyte imbalances  - Administer electrolyte replacement as ordered  - Monitor response to electrolyte replacements, including repeat lab results as appropriate  - Instruct patient on fluid and nutrition as appropriate  Outcome: Progressing     Problem: METABOLIC, FLUID AND ELECTROLYTES - ADULT  Goal: Fluid balance maintained  Description  INTERVENTIONS:  - Monitor labs   - Monitor I/O and WT  - Instruct patient on fluid and nutrition as appropriate  - Assess for signs & symptoms of volume excess or deficit  Outcome: Progressing     Problem: METABOLIC, FLUID AND ELECTROLYTES - ADULT  Goal: Glucose maintained within target range  Description  INTERVENTIONS:  - Monitor Blood Glucose as ordered  - Assess for signs and symptoms of hyperglycemia and hypoglycemia  - Administer ordered medications to maintain glucose within target range  - Assess nutritional intake and initiate nutrition service referral as needed  Outcome: Progressing   Pt for 3 75hr HD treatment today using left lower arm fistula  Left lower arm fistula cannulated with #15g needles x2 without difficulty  Plan to remove 4kg as tolerated

## 2019-08-29 NOTE — ASSESSMENT & PLAN NOTE
· On Coumadin  · INR in ER which is elevated at 7   · No active signs of bleeding  · Hemoglobin 7 7 which is her baseline    · INR today is 2 2-will hold anticoagulation today until definite plan regarding wound debridement

## 2019-08-29 NOTE — PROGRESS NOTES
Vancomycin Assessment    Madhavi Holbrook is a 48 y o  female who is currently receiving vancomycin 750 mg PRN level < 25 mcg/mL for skin-soft tissue infection     Relevant clinical data and objective history reviewed:  Creatinine   Date Value Ref Range Status   08/29/2019 8 53 (H) 0 60 - 1 30 mg/dL Final     Comment:     Standardized to IDMS reference method   08/28/2019 6 80 (H) 0 60 - 1 30 mg/dL Final     Comment:     Standardized to IDMS reference method   08/27/2019 10 20 (H) 0 60 - 1 30 mg/dL Final     Comment:     Standardized to IDMS reference method   10/11/2015 6 68 (H) 0 60 - 1 30 mg/dL Final     Comment:     Standardized to IDMS reference method   10/08/2015 9 09 (H) 0 60 - 1 30 mg/dL Final     Comment:     Standardized to IDMS reference method   10/07/2015 7 06 (H) 0 60 - 1 30 mg/dL Final     Comment:     Standardized to IDMS reference method     VANCOMYCIN RANDOM   Date Value Ref Range Status   05/26/2014 30 1 mcg/mL Final     Comment:     The above 1 analytes were performed by Larry  [address] [place] [address]       Vancomycin Rm   Date Value Ref Range Status   08/29/2019 20 7 ug/mL Final     /59   Pulse 71   Temp 97 8 °F (36 6 °C) (Oral)   Resp 18   Ht 5' 5 98" (1 676 m)   Wt 126 kg (276 lb 14 4 oz)   LMP 02/12/2016   SpO2 94%   BMI 44 71 kg/m²   I/O last 3 completed shifts:   In: 840 [P O :840]  Out: -   Lab Results   Component Value Date/Time    BUN 71 (H) 08/29/2019 05:48 AM    BUN 64 (H) 10/11/2015 06:08 AM    WBC 5 99 08/29/2019 05:48 AM    WBC 8 45 10/11/2015 06:14 AM    HGB 8 0 (L) 08/29/2019 05:48 AM    HGB 8 7 (L) 10/11/2015 06:14 AM    HCT 24 7 (L) 08/29/2019 05:48 AM    HCT 26 3 (L) 10/11/2015 06:14 AM    MCV 96 08/29/2019 05:48 AM    MCV 91 10/11/2015 06:14 AM     (L) 08/29/2019 05:48 AM     10/11/2015 06:14 AM     Temp Readings from Last 3 Encounters:   08/29/19 97 8 °F (36 6 °C) (Oral)   05/19/19 97 5 °F (36 4 °C)   05/01/19 (!) 96 5 °F (35 8 °C) (Tympanic)     Vancomycin Days of Therapy: 4    Assessment/Plan  The patient is currently on vancomycin utilizing pulse dosing  Baseline risks associated with therapy include: pre-existing renal impairment  The patient undergoes HD T/R/S  Vancomycin random level this AM was 20 7, and as such was redosed with 750 mg once  Pharmacy will continue to follow closely for s/sx of nephrotoxicity, infusion reactions and appropriateness of therapy  Plan for repeat level with AM labs on next HD day (8/31)  Patient should receive dose if SD < 25 mcg/mL (correlates with level of 15-20 mcg/mL)  Pharmacy will continue to follow the patients culture results and clinical progress daily      Rogelio Mcintosh, PharmD

## 2019-08-29 NOTE — PROGRESS NOTES
Progress Note Wilmar Sheppard 1965, 48 y o  female MRN: [id number]    [facility] Encounter: [id number]    Primary Care Provider: Ezequiel Walker MD   Date and time admitted to hospital: 8/26/2019 10:23 AM        * Diabetic foot ulcer ([facility] )  Assessment & Plan  · POA  · No fever or leukocytosis  Elevated CRP noted  · X-ray obtain in ER, shows No acute osseous abnormality  · Left heel wound/ ulcer with pus like discharge and significant tenderness-last podiatry note appreciated and planning for debridement once the INR  improves  · Started on vancomycin, cefepime and Flagyl in ER, discontinue cefepime and Flagyl per Podiatry recommendations, continue vancomycin  · Podiatry consulted, status post wound debridement  X-ray is negative for any acute osteomyelitis  · Sed rate elevated at 81  · Blood cultures pending-no growth at 72 hours  · Wound cultures pending-wound culture is growing MRSA and strep-currently on vancomycin  · Will discussed with Podiatry tomorrow regarding wound debridement      Candidiasis of breast  Assessment & Plan  · Patient with cutaneous candidiasis under the left breast  · Nystatin    Hyperkalemia  Assessment & Plan  Appears to be resolved with dialysis    Anemia in end-stage renal disease ([facility] )  Assessment & Plan  Baseline hemoglobin seems between 8-10  Hemoglobin at baseline  Trend CBC  Type 2 diabetes mellitus [facility])  Assessment & Plan  Lab Results   Component Value Date    HGBA1C 10 4 (H) 08/27/2019       Recent Labs     08/28/19  2109 08/29/19  0707 08/29/19  1320 08/29/19  1654   POCGLU 142* 233* 241* 321*       Blood Sugar Average: Last 72 hrs:  (P) 678 [id number]   Not well controlled as noted by HbA1c  On 28 units Lantus at home  Continue basal insulin  Insulin sliding scale and Accu-Cheks    HbA1c elevated, will add meal coverage  Adjust the dose of insulin since the blood sugar is consistently above goal range    ESRD on hemodialysis [facility])  Assessment & Plan  On  schedule  Had dialysis today    History of DVT (deep vein thrombosis)  Assessment & Plan  · On Coumadin  · INR in ER which is elevated at 7   · No active signs of bleeding  · Hemoglobin 7 7 which is her baseline  · INR today is 2 2-will hold anticoagulation today until definite plan regarding wound debridement    Supratherapeutic INRresolved as of 2019  Assessment & Plan  As mentioned in DVT  The INR down to 2 2      VTE Pharmacologic Prophylaxis:   Pharmacologic: Warfarin (Coumadin)  Mechanical VTE Prophylaxis in Place: Yes    Patient Centered Rounds: I have performed bedside rounds with nursing staff today  Discussions with Specialists or Other Care Team Provider:     Education and Discussions with Family / Patient:  Significant other updated in the room    Time Spent for Care: 30 minutes  More than 50% of total time spent on counseling and coordination of care as described above  Current Length of Stay: 3 day(s)    Current Patient Status: Inpatient   Certification Statement: The patient will continue to require additional inpatient hospital stay due to IV antibiotics/decision regarding wound debridement    Discharge Plan:     Code Status: Level 1 - Full Code      Subjective:   Patient seen and examined  Patient was very upset about the culture results showing MRSA  Discussed with the patient and family member  Patient is requesting nystatin powder for the breast fold    Objective:     Vitals:   Temp (24hrs), Av °F (36 7 °C), Min:97 8 °F (36 6 °C), Max:98 2 °F (36 8 °C)    Temp:  [97 8 °F (36 6 °C)-98 2 °F (36 8 °C)] 98 °F (36 7 °C)  HR:  [66-89] 89  Resp:  [18-19] 18  BP: ()/(39-88) 100/62  SpO2:  [90 %-96 %] 96 %  Body mass index is 44 71 kg/m²  Input and Output Summary (last 24 hours):        Intake/Output Summary (Last 24 hours) at 2019  Last data filed at 2019 1501  Gross per 24 hour   Intake 890 ml   Output 4671 ml   Net -3781 ml       Physical Exam:     Physical Exam   Constitutional: She appears well-developed  HENT:   Head: Normocephalic  Eyes: Right eye exhibits no discharge  Left eye exhibits no discharge  Neck: No JVD present  Cardiovascular: Normal rate  No murmur heard  Pulmonary/Chest: Effort normal  No stridor  Abdominal: Soft  Bowel sounds are normal  She exhibits no distension  Musculoskeletal:   Right heel ulceration covered in dressing-no evidence of purulence when removing the bandage  Right big to wound covered in dressing   Neurological: She is alert  Skin: Rash noted  Cutaneous candidiasis underneath the left breast       Additional Data:     Labs:    Results from last 7 days   Lab Units 08/29/19  0548 08/28/19  0605   WBC Thousand/uL 5 99 6 32   HEMOGLOBIN g/dL 8 0* 8 3*   HEMATOCRIT % 24 7* 26 1*   PLATELETS Thousands/uL 137* 145*   NEUTROS PCT %  --  68   LYMPHS PCT %  --  19   MONOS PCT %  --  8   EOS PCT %  --  3     Results from last 7 days   Lab Units 08/29/19  0548   POTASSIUM mmol/L 5 3   CHLORIDE mmol/L 97*   CO2 mmol/L 27   BUN mg/dL 71*   CREATININE mg/dL 8 53*   CALCIUM mg/dL 7 6*   ALK PHOS U/L 216*   ALT U/L 17   AST U/L 16     Results from last 7 days   Lab Units 08/29/19  0548   INR  2 28*       * I Have Reviewed All Lab Data Listed Above  * Additional Pertinent Lab Tests Reviewed: Brooke 68 Admission Reviewed    Imaging:    Imaging Reports Reviewed Today Include:   Imaging Personally Reviewed by Myself Includes:      Recent Cultures (last 7 days):     Results from last 7 days   Lab Units 08/27/19  1347 08/26/19  1309   BLOOD CULTURE   --  No Growth at 72 hrs  No Growth at 72 hrs     GRAM STAIN RESULT  Rare Polys*  2+ Gram positive cocci in pairs*  --    WOUND CULTURE  2+ Growth of Methicillin Resistant Staphylococcus aureus*  2+ Growth of Beta Hemolytic Streptococcus Group B*  --        Last 24 Hours Medication List:     Current Facility-Administered Medications:  acetaminophen 650 mg Oral Q6H PRN Marci Kussmaul, MD    ALPRAZolam 0 25 mg Oral BID PRN Marci Kussmaul, MD    atorvastatin 20 mg Oral QPM Marci Kussmaul, MD    b complex-vitamin C-folic acid 1 capsule Oral Daily With Luisito Marie MD    brimonidine 1 drop Both Eyes BID Marci Kussmaul, MD    carvedilol 25 mg Oral BID With Meals Marci Kussmaul, MD    cholecalciferol 2,000 Units Oral Daily Marci Kussmaul, MD    cinacalcet 30 mg Oral Daily Marci Kussmaul, MD    cloNIDine 0 1 mg Oral BID Marci Kussmaul, MD    cyclobenzaprine 10 mg Oral HS Marci Kussmaul, MD    doxercalciferol 1 mcg Intravenous After Dialysis Ruba Monahan PA-C    gabapentin 100 mg Oral TID Marci Kussmaul, MD Romelle Balls ON 8/30/2019] insulin glargine 30 Units Subcutaneous QAM aDrrell Mccabe MD    insulin lispro 1-5 Units Subcutaneous TID [facility] Marci Kussmaul, MD    insulin lispro 1-5 Units Subcutaneous HS Marci Kussmaul, MD    insulin lispro 4 Units Subcutaneous TID With Meals Keesha MILLAN PA-C    latanoprost 1 drop Both Eyes HS Marci Kussmaul, MD    levothyroxine 50 mcg Oral Early Morning Marci Kussmaul, MD    loratadine 10 mg Oral Q48H Marci Kussmaul, MD    losartan 25 mg Oral Daily Marci Kussmaul, MD    melatonin 3 mg Oral HS Marci Kussmaul, MD    nystatin  Topical BID Darrell Mccabe MD    oxyCODONE 10 mg Oral Q6H PRN Marci Kussmaul, MD    oxyCODONE 5 mg Oral Q6H PRN Marci Kussmaul, MD    pantoprazole 40 mg Oral Daily Marci Kussmaul, MD    prednisoLONE acetate 1 drop Both Eyes 4x Daily Marci Kussmaul, MD    sertraline 50 mg Oral Daily Marci Kussmaul, MD    timolol 1 drop Ophthalmic BID Marci Kussmaul, MD    torsemide 200 mg Oral Q12H Marci Kussmaul, MD    vancomycin 10 mg/kg (Adjusted) Intravenous Daily PRN Marci Kussmaul, MD Last Rate: Stopped (08/29/19 1501)        Today, Patient Was Seen By: Darrell Mccabe MD    ** Please Note: Dictation voice to text software may have been used in the creation of this document  **

## 2019-08-29 NOTE — PROGRESS NOTES
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:  Continue Hectorol 1 mcg Q HD, Sensipar 30 mg daily and Renvela 3 tablets t i d  With meals  5   Anemia of CKD:  Hemoglobin 8 today  -Not on Epogen due to history of PE/DVT  -Hold Venofer due to infection  6  Hyponatremia:  Sodium 134 today  Continue fluid restriction and volume management with dialysis  7  Hypertension: BP acceptable today on current regimen      Subjective:   Patient seen examined by me on hemodialysis approximately 8:40 a m  Blood pressure 116/59, UF goal 4L, Qb 400ml/m, Qd 600ml/m  She is tolerating dialysis well      Objective:     Vitals: Blood pressure 116/59, pulse 72, temperature 97 8 °F (36 6 °C), temperature source Oral, resp  rate 18, height 5' 5 98" (1 676 m), weight 126 kg (276 lb 14 4 oz), last menstrual period 2016, SpO2 94 %  ,Body mass index is 44 71 kg/m²  Temp (24hrs), Av 1 °F (36 7 °C), Min:97 5 °F (36 4 °C), Max:98 9 °F (37 2 °C)      Weight (last 2 days)     None            Intake/Output Summary (Last 24 hours) at 2019 0844  Last data filed at 2019 7042  Gross per 24 hour   Intake 290 ml   Output    Net 290 ml     I/O last 24 hours: In: 0 [P O :450; I V :200]  Out: -         Physical Exam:   Physical Exam   Constitutional: She appears well-developed and well-nourished  No distress  obese   HENT:   Head: Normocephalic and atraumatic  Mouth/Throat: No oropharyngeal exudate  Eyes: Right eye exhibits no discharge  Left eye exhibits no discharge  No scleral icterus  Neck: Normal range of motion  Neck supple  No thyromegaly present  Cardiovascular: Normal rate and regular rhythm  No murmur heard  Pulmonary/Chest: Effort normal and breath sounds normal  No respiratory distress  She has no wheezes  Abdominal: Soft  Bowel sounds are normal  She exhibits no distension  Musculoskeletal: She exhibits no edema  Right foot and heel wrapped   Neurological: She is alert  She exhibits normal muscle tone  awake   Skin: Skin is warm and dry  No rash noted  She is not diaphoretic  Psychiatric:   Flat affect   Nursing note and vitals reviewed  Invasive Devices     Peripheral Intravenous Line            Peripheral IV 08/26/19 Dorsal (posterior); Proximal;Right Forearm 2 days          Line            Hemodialysis AV Fistula Left Forearm -- days    Hemodialysis AV Fistula 02/20/18 Left Forearm 554 days                Medications:    Scheduled Meds:  Current Facility-Administered Medications:  acetaminophen 650 mg Oral Q6H PRN Malik Blackwell MD    ALPRAZolam 0 25 mg Oral BID PRN Malik Blackwell MD    atorvastatin 20 mg Oral QPM Malik Blackwell MD    b complex-vitamin C-folic acid 1 capsule Oral Daily With Manuel Doran MD    brimonidine 1 drop Both Eyes BID Malik Blackwell MD    carvedilol 25 mg Oral BID With Meals Mlaik Blackwell MD    cholecalciferol 2,000 Units Oral Daily Malik Blackwell MD    cinacalcet 30 mg Oral Daily Malik Blackwell MD    cloNIDine 0 1 mg Oral BID Malik Blackwell MD    cyclobenzaprine 10 mg Oral HS Malik Blackwell MD    doxercalciferol 1 mcg Intravenous After Dialysis Rudolfo Ahumada, PA-C    gabapentin 100 mg Oral TID Malik Blackwell MD    insulin glargine 28 Units Subcutaneous QAM Malik Blackwell MD    insulin lispro 1-5 Units Subcutaneous TID [facility] [place] Malik Blackwell MD    insulin lispro 1-5 Units Subcutaneous HS Malik Blackwell MD    insulin lispro 4 Units Subcutaneous TID With Meals Marchia Cockayne V, PA-C latanoprost 1 drop Both Eyes HS Chiquita Cleaning MD    levothyroxine 50 mcg Oral Early Morning Chiquita Cleaning MD    loratadine 10 mg Oral Q48H Chiquita Cleaning MD    losartan 25 mg Oral Daily Chiquita Cleaning MD    melatonin 3 mg Oral HS Chiquita Cleaning MD    oxyCODONE 10 mg Oral Q6H PRN Chiquita lCeaning MD    oxyCODONE 5 mg Oral Q6H PRN Chiquita Cleaning MD    pantoprazole 40 mg Oral Daily Chiquita Cleaning MD    prednisoLONE acetate 1 drop Both Eyes 4x Daily Chiquita Cleaning MD    sertraline 50 mg Oral Daily Chiquita Cleaning MD    timolol 1 drop Ophthalmic BID Chiquita Cleaning MD    torsemide 200 mg Oral Q12H Chiquita Cleaning MD    vancomycin 10 mg/kg (Adjusted) Intravenous Daily PRN Chiquita Cleaning MD Last Rate: Stopped (08/27/19 1200)       PRN Meds:   acetaminophen    ALPRAZolam    doxercalciferol    oxyCODONE    oxyCODONE    vancomycin    Continuous Infusions:         LAB RESULTS:      Results from last 7 days   Lab Units 08/29/19  0548 08/28/19  0712 08/28/19  0605 08/27/19  0556 08/26/19  1154   WBC Thousand/uL 5 99  --  6 32 5 44 5 77   HEMOGLOBIN g/dL 8 0*  --  8 3* 7 7* 8 3*   HEMATOCRIT % 24 7*  --  26 1* 24 4* 25 3*   PLATELETS Thousands/uL 137*  --  145* 130* 136*   NEUTROS PCT %  --   --  68 69 74   LYMPHS PCT %  --   --  19 18 15   MONOS PCT %  --   --  8 9 8   EOS PCT %  --   --  3 4 2   POTASSIUM mmol/L 5 3 4 8  --  6 3* 5 8*   CHLORIDE mmol/L 97* 95*  --  100 97*   CO2 mmol/L 27 29  --  26 27   BUN mg/dL 71* 44*  --  88* 58*   CREATININE mg/dL 8 53* 6 80*  --  10 20* 9 26*   CALCIUM mg/dL 7 6* 7 7*  --  7 8* 7 9*   ALK PHOS U/L 216* 223*  --   --   --    ALT U/L 17 20  --   --   --    AST U/L 16 16  --   --   --        CUTURES:  Lab Results   Component Value Date    BLOODCX No Growth at 48 hrs  08/26/2019    BLOODCX No Growth at 48 hrs  08/26/2019    BLOODCX No Growth After 5 Days  04/26/2019    BLOODCX No Growth After 5 Days  04/26/2019    BLOODCX No Growth After 5 Days   08/08/2017    BLOODCX No Growth After 5 Days  08/08/2017    BLOODCX No Growth After 5 Days  07/11/2016    BLOODCX No Growth After 5 Days  07/11/2016    URINECX >100,000 cfu/ml Lactobacillus species 07/12/2016    URINECX >100,000 cfu/ml Mixed Contaminants X3 07/12/2016                 Portions of the record may have been created with voice recognition software  Occasional wrong word or "sound a like" substitutions may have occurred due to the inherent limitations of voice recognition software  Read the chart carefully and recognize, using context, where substitutions have occurred  If you have any questions, please contact the dictating provider

## 2019-08-29 NOTE — PLAN OF CARE
Problem: Potential for Falls  Goal: Patient will remain free of falls  Description  INTERVENTIONS:  - Assess patient frequently for physical needs  -  Identify cognitive and physical deficits and behaviors that affect risk of falls    -  [place] fall precautions as indicated by assessment   - Educate patient/family on patient safety including physical limitations  - Instruct patient to call for assistance with activity based on assessment  - Modify environment to reduce risk of injury  - Consider OT/PT consult to assist with strengthening/mobility  Outcome: Progressing     Problem: PAIN - ADULT  Goal: Verbalizes/displays adequate comfort level or baseline comfort level  Description  Interventions:  - Encourage patient to monitor pain and request assistance  - Assess pain using appropriate pain scale  - Administer analgesics based on type and severity of pain and evaluate response  - Implement non-pharmacological measures as appropriate and evaluate response  - Consider cultural and social influences on pain and pain management  - Notify physician/advanced practitioner if interventions unsuccessful or patient reports new pain  Outcome: Progressing     Problem: METABOLIC, FLUID AND ELECTROLYTES - ADULT  Goal: Electrolytes maintained within normal limits  Description  INTERVENTIONS:  - Monitor labs and assess patient for signs and symptoms of electrolyte imbalances  - Administer electrolyte replacement as ordered  - Monitor response to electrolyte replacements, including repeat lab results as appropriate  - Instruct patient on fluid and nutrition as appropriate  Outcome: Progressing  Goal: Fluid balance maintained  Description  INTERVENTIONS:  - Monitor labs   - Monitor I/O and WT  - Instruct patient on fluid and nutrition as appropriate  - Assess for signs & symptoms of volume excess or deficit  Outcome: Progressing  Goal: Glucose maintained within target range  Description  INTERVENTIONS:  - Monitor Blood Glucose as ordered  - Assess for signs and symptoms of hyperglycemia and hypoglycemia  - Administer ordered medications to maintain glucose within target range  - Assess nutritional intake and initiate nutrition service referral as needed  Outcome: Progressing     Problem: CARDIOVASCULAR - ADULT  Goal: Maintains optimal cardiac output and hemodynamic stability  Description  INTERVENTIONS:  - Monitor I/O, vital signs and rhythm  - Monitor for S/S and trends of decreased cardiac output  - Administer and titrate ordered vasoactive medications to optimize hemodynamic stability  - Assess quality of pulses, skin color and temperature  - Assess for signs of decreased coronary artery perfusion  - Instruct patient to report change in severity of symptoms  Outcome: Progressing  Goal: Absence of cardiac dysrhythmias or at baseline rhythm  Description  INTERVENTIONS:  - Continuous cardiac monitoring, vital signs, obtain 12 lead EKG if ordered  - Administer antiarrhythmic and heart rate control medications as ordered  - Monitor electrolytes and administer replacement therapy as ordered  Outcome: Progressing     Problem: SKIN/TISSUE INTEGRITY - ADULT  Goal: Skin integrity remains intact  Description  INTERVENTIONS  - Identify patients at risk for skin breakdown  - Assess and monitor skin integrity  - Assess and monitor nutrition and hydration status  - Monitor labs (i e  albumin)  - Assess for incontinence   - Turn and reposition patient  - Assist with mobility/ambulation  - Relieve pressure over bony prominences  - Avoid friction and shearing  - Provide appropriate hygiene as needed including keeping skin clean and dry  - Evaluate need for skin moisturizer/barrier cream  - Collaborate with interdisciplinary team (i e  Nutrition, Rehabilitation, etc )   - Patient/family teaching  Outcome: Progressing  Goal: Incision(s), wounds(s) or drain site(s) healing without S/S of infection  Description  INTERVENTIONS  - Assess and document risk factors for skin impairment   - Assess and document dressing, incision, wound bed, drain sites and surrounding tissue  - Consider nutrition services referral as needed  - Oral mucous membranes remain intact  - Provide patient/ family education  Outcome: Progressing  Goal: Oral mucous membranes remain intact  Description  INTERVENTIONS  - Assess oral mucosa and hygiene practices  - Implement preventative oral hygiene regimen  - Implement oral medicated treatments as ordered  - Initiate Nutrition services referral as needed  Outcome: Progressing     Problem: MUSCULOSKELETAL - ADULT  Goal: Maintain or return mobility to safest level of function  Description  INTERVENTIONS:  - Assess patient's ability to carry out ADLs; assess patient's baseline for ADL function and identify physical deficits which impact ability to perform ADLs (bathing, care of mouth/teeth, toileting, grooming, dressing, etc )  - Assess/evaluate cause of self-care deficits   - Assess range of motion  - Assess patient's mobility  - Assess patient's need for assistive devices and provide as appropriate  - Encourage maximum independence but intervene and supervise when necessary  - Involve family in performance of ADLs  - Assess for home care needs following discharge   - Consider OT consult to assist with ADL evaluation and planning for discharge  - Provide patient education as appropriate  Outcome: Progressing  Goal: Maintain proper alignment of affected body part  Description  INTERVENTIONS:  - Support, maintain and protect limb and body alignment  - Provide patient/ family with appropriate education  Outcome: Progressing     Problem: INFECTION - ADULT  Goal: Absence or prevention of progression during hospitalization  Description  INTERVENTIONS:  - Assess and monitor for signs and symptoms of infection  - Monitor lab/diagnostic results  - Monitor all insertion sites, i e  indwelling lines, tubes, and drains  - Monitor endotracheal if appropriate and nasal secretions for changes in amount and color  - [place] appropriate cooling/warming therapies per order  - Administer medications as ordered  - Instruct and encourage patient and family to use good hand hygiene technique  - Identify and instruct in appropriate isolation precautions for identified infection/condition  Outcome: Progressing  Goal: Absence of fever/infection during neutropenic period  Description  INTERVENTIONS:  - Monitor WBC    Outcome: Progressing     Problem: SAFETY ADULT  Goal: Maintain or return to baseline ADL function  Description  INTERVENTIONS:  -  Assess patient's ability to carry out ADLs; assess patient's baseline for ADL function and identify physical deficits which impact ability to perform ADLs (bathing, care of mouth/teeth, toileting, grooming, dressing, etc )  - Assess/evaluate cause of self-care deficits   - Assess range of motion  - Assess patient's mobility; develop plan if impaired  - Assess patient's need for assistive devices and provide as appropriate  - Encourage maximum independence but intervene and supervise when necessary  - Involve family in performance of ADLs  - Assess for home care needs following discharge   - Consider OT consult to assist with ADL evaluation and planning for discharge  - Provide patient education as appropriate  Outcome: Progressing  Goal: Maintain or return mobility status to optimal level  Description  INTERVENTIONS:  - Assess patient's baseline mobility status (ambulation, transfers, stairs, etc )    - Identify cognitive and physical deficits and behaviors that affect mobility  - Identify mobility aids required to assist with transfers and/or ambulation (gait belt, sit-to-stand, lift, walker, cane, etc )  - [place] fall precautions as indicated by assessment  - Record patient progress and toleration of activity level on Mobility SBAR; progress patient to next Phase/Stage  - Instruct patient to call for assistance with activity based on assessment  - Consider rehabilitation consult to assist with strengthening/weightbearing, etc   Outcome: Progressing     Problem: Nutrition/Hydration-ADULT  Goal: Nutrient/Hydration intake appropriate for improving, restoring or maintaining nutritional needs  Description  Monitor and assess patient's nutrition/hydration status for malnutrition  Collaborate with interdisciplinary team and initiate plan and interventions as ordered  Monitor patient's weight and dietary intake as ordered or per policy  Utilize nutrition screening tool and intervene as necessary  Determine patient's food preferences and provide high-protein, high-caloric foods as appropriate       INTERVENTIONS:  - Monitor oral intake, urinary output, labs, and treatment plans  - Assess nutrition and hydration status and recommend course of action  - Evaluate amount of meals eaten  - Assist patient with eating if necessary   - Allow adequate time for meals  - Recommend/ encourage appropriate diets, oral nutritional supplements, and vitamin/mineral supplements  - Order, calculate, and assess calorie counts as needed  - Recommend, monitor, and adjust tube feedings and TPN/PPN based on assessed needs  - Assess need for intravenous fluids  - Provide specific nutrition/hydration education as appropriate  - Include patient/family/caregiver in decisions related to nutrition  Outcome: Progressing

## 2019-08-29 NOTE — ASSESSMENT & PLAN NOTE
· POA  · No fever or leukocytosis  Elevated CRP noted  · X-ray obtain in ER, shows No acute osseous abnormality  · Left heel wound/ ulcer with pus like discharge and significant tenderness-last podiatry note appreciated and planning for debridement once the INR  improves  · Started on vancomycin, cefepime and Flagyl in ER, discontinue cefepime and Flagyl per Podiatry recommendations, continue vancomycin  · Podiatry consulted, status post wound debridement  X-ray is negative for any acute osteomyelitis  · Sed rate elevated at 81  · Blood cultures pending-no growth at 72 hours  · Wound cultures pending-wound culture is growing MRSA and strep-currently on vancomycin     · Will discussed with Podiatry tomorrow regarding wound debridement

## 2019-08-29 NOTE — HEMODIALYSIS
Hemodialysis treatment lasting 3 3/4 hours    Left forearm fistula maintains 400 bfr   -volume removal 4671 ml  -pre weight 125 6 kg  -post weight 121 2 kg

## 2019-08-29 NOTE — ASSESSMENT & PLAN NOTE
Lab Results   Component Value Date    HGBA1C 10 4 (H) 08/27/2019       Recent Labs     08/28/19  2109 08/29/19  0707 08/29/19  1320 08/29/19  1654   POCGLU 142* 233* 241* 321*       Blood Sugar Average: Last 72 hrs:  (P) 642 [id number]   Not well controlled as noted by HbA1c  On 28 units Lantus at home  Continue basal insulin  Insulin sliding scale and Accu-Cheks    HbA1c elevated, will add meal coverage  Adjust the dose of insulin since the blood sugar is consistently above goal range

## 2019-08-30 PROBLEM — I95.3 HEMODIALYSIS-ASSOCIATED HYPOTENSION: Status: ACTIVE | Noted: 2019-08-30

## 2019-08-30 LAB
ANION GAP SERPL CALCULATED.3IONS-SCNC: 6 MMOL/L (ref 4–13)
BASOPHILS # BLD AUTO: 0.04 THOUSANDS/ΜL (ref 0–0.1)
BASOPHILS NFR BLD AUTO: 1 % (ref 0–1)
BUN SERPL-MCNC: 47 MG/DL (ref 5–25)
CALCIUM SERPL-MCNC: 8.1 MG/DL (ref 8.3–10.1)
CHLORIDE SERPL-SCNC: 98 MMOL/L (ref 100–108)
CO2 SERPL-SCNC: 29 MMOL/L (ref 21–32)
CREAT SERPL-MCNC: 6.45 MG/DL (ref 0.6–1.3)
EOSINOPHIL # BLD AUTO: 0.23 THOUSAND/ΜL (ref 0–0.61)
EOSINOPHIL NFR BLD AUTO: 3 % (ref 0–6)
ERYTHROCYTE [DISTWIDTH] IN BLOOD BY AUTOMATED COUNT: 14.2 % (ref 11.6–15.1)
GFR SERPL CREATININE-BSD FRML MDRD: 7 ML/MIN/1.73SQ M
GLUCOSE SERPL-MCNC: 130 MG/DL (ref 65–140)
GLUCOSE SERPL-MCNC: 162 MG/DL (ref 65–140)
GLUCOSE SERPL-MCNC: 169 MG/DL (ref 65–140)
GLUCOSE SERPL-MCNC: 170 MG/DL (ref 65–140)
GLUCOSE SERPL-MCNC: 180 MG/DL (ref 65–140)
GLUCOSE SERPL-MCNC: 98 MG/DL (ref 65–140)
HCT VFR BLD AUTO: 26.2 % (ref 34.8–46.1)
HGB BLD-MCNC: 8.4 G/DL (ref 11.5–15.4)
IMM GRANULOCYTES # BLD AUTO: 0.04 THOUSAND/UL (ref 0–0.2)
IMM GRANULOCYTES NFR BLD AUTO: 1 % (ref 0–2)
LYMPHOCYTES # BLD AUTO: 1.21 THOUSANDS/ΜL (ref 0.6–4.47)
LYMPHOCYTES NFR BLD AUTO: 17 % (ref 14–44)
MCH RBC QN AUTO: 30.4 PG (ref 26.8–34.3)
MCHC RBC AUTO-ENTMCNC: 32.1 G/DL (ref 31.4–37.4)
MCV RBC AUTO: 95 FL (ref 82–98)
MONOCYTES # BLD AUTO: 0.75 THOUSAND/ΜL (ref 0.17–1.22)
MONOCYTES NFR BLD AUTO: 10 % (ref 4–12)
NEUTROPHILS # BLD AUTO: 5.05 THOUSANDS/ΜL (ref 1.85–7.62)
NEUTS SEG NFR BLD AUTO: 68 % (ref 43–75)
NRBC BLD AUTO-RTO: 0 /100 WBCS
PLATELET # BLD AUTO: 143 THOUSANDS/UL (ref 149–390)
PMV BLD AUTO: 11 FL (ref 8.9–12.7)
POTASSIUM SERPL-SCNC: 4.7 MMOL/L (ref 3.5–5.3)
RBC # BLD AUTO: 2.76 MILLION/UL (ref 3.81–5.12)
SODIUM SERPL-SCNC: 133 MMOL/L (ref 136–145)
WBC # BLD AUTO: 7.32 THOUSAND/UL (ref 4.31–10.16)

## 2019-08-30 PROCEDURE — 99232 SBSQ HOSP IP/OBS MODERATE 35: CPT | Performed by: INTERNAL MEDICINE

## 2019-08-30 PROCEDURE — 82948 REAGENT STRIP/BLOOD GLUCOSE: CPT

## 2019-08-30 PROCEDURE — 85025 COMPLETE CBC W/AUTO DIFF WBC: CPT | Performed by: INTERNAL MEDICINE

## 2019-08-30 PROCEDURE — 99232 SBSQ HOSP IP/OBS MODERATE 35: CPT | Performed by: FAMILY MEDICINE

## 2019-08-30 PROCEDURE — 80048 BASIC METABOLIC PNL TOTAL CA: CPT | Performed by: INTERNAL MEDICINE

## 2019-08-30 RX ORDER — LOSARTAN POTASSIUM 25 MG/1
25 TABLET ORAL
Status: DISCONTINUED | OUTPATIENT
Start: 2019-09-01 | End: 2019-08-31 | Stop reason: HOSPADM

## 2019-08-30 RX ORDER — TORSEMIDE 20 MG/1
100 TABLET ORAL EVERY 12 HOURS
Status: DISCONTINUED | OUTPATIENT
Start: 2019-08-30 | End: 2019-08-31 | Stop reason: HOSPADM

## 2019-08-30 RX ORDER — WARFARIN SODIUM 5 MG/1
10 TABLET ORAL
Status: COMPLETED | OUTPATIENT
Start: 2019-08-30 | End: 2019-08-30

## 2019-08-30 RX ORDER — MIDODRINE HYDROCHLORIDE 5 MG/1
5 TABLET ORAL ONCE
Status: COMPLETED | OUTPATIENT
Start: 2019-08-30 | End: 2019-08-30

## 2019-08-30 RX ADMIN — PREDNISOLONE ACETATE 1 DROP: 10 SUSPENSION/ DROPS OPHTHALMIC at 17:48

## 2019-08-30 RX ADMIN — INSULIN GLARGINE 30 UNITS: 100 INJECTION, SOLUTION SUBCUTANEOUS at 08:43

## 2019-08-30 RX ADMIN — INSULIN LISPRO 4 UNITS: 100 INJECTION, SOLUTION INTRAVENOUS; SUBCUTANEOUS at 12:26

## 2019-08-30 RX ADMIN — INSULIN LISPRO 1 UNITS: 100 INJECTION, SOLUTION INTRAVENOUS; SUBCUTANEOUS at 08:47

## 2019-08-30 RX ADMIN — TIMOLOL MALEATE 1 DROP: 2.5 SOLUTION OPHTHALMIC at 17:48

## 2019-08-30 RX ADMIN — MELATONIN 3 MG: 3 TAB ORAL at 21:47

## 2019-08-30 RX ADMIN — ATORVASTATIN CALCIUM 20 MG: 20 TABLET, FILM COATED ORAL at 17:47

## 2019-08-30 RX ADMIN — CARVEDILOL 25 MG: 25 TABLET, FILM COATED ORAL at 17:47

## 2019-08-30 RX ADMIN — GABAPENTIN 100 MG: 100 CAPSULE ORAL at 17:47

## 2019-08-30 RX ADMIN — PREDNISOLONE ACETATE 1 DROP: 10 SUSPENSION/ DROPS OPHTHALMIC at 12:27

## 2019-08-30 RX ADMIN — BRIMONIDINE TARTRATE 1 DROP: 1.5 SOLUTION OPHTHALMIC at 17:49

## 2019-08-30 RX ADMIN — OXYCODONE HYDROCHLORIDE 5 MG: 5 TABLET ORAL at 11:36

## 2019-08-30 RX ADMIN — SODIUM CHLORIDE 250 ML: 0.9 INJECTION, SOLUTION INTRAVENOUS at 01:43

## 2019-08-30 RX ADMIN — CARVEDILOL 25 MG: 25 TABLET, FILM COATED ORAL at 08:42

## 2019-08-30 RX ADMIN — GABAPENTIN 100 MG: 100 CAPSULE ORAL at 21:47

## 2019-08-30 RX ADMIN — LORATADINE 10 MG: 10 TABLET ORAL at 14:48

## 2019-08-30 RX ADMIN — LOSARTAN POTASSIUM 25 MG: 25 TABLET, FILM COATED ORAL at 08:41

## 2019-08-30 RX ADMIN — WARFARIN SODIUM 10 MG: 5 TABLET ORAL at 19:22

## 2019-08-30 RX ADMIN — PANTOPRAZOLE SODIUM 40 MG: 40 TABLET, DELAYED RELEASE ORAL at 06:00

## 2019-08-30 RX ADMIN — BRIMONIDINE TARTRATE 1 DROP: 1.5 SOLUTION OPHTHALMIC at 08:59

## 2019-08-30 RX ADMIN — CYCLOBENZAPRINE HYDROCHLORIDE 10 MG: 10 TABLET, FILM COATED ORAL at 21:47

## 2019-08-30 RX ADMIN — SERTRALINE HYDROCHLORIDE 50 MG: 50 TABLET ORAL at 08:41

## 2019-08-30 RX ADMIN — OXYCODONE HYDROCHLORIDE 5 MG: 5 TABLET ORAL at 20:06

## 2019-08-30 RX ADMIN — VITAMIN D, TAB 1000IU (100/BT) 2000 UNITS: 25 TAB at 08:42

## 2019-08-30 RX ADMIN — INSULIN LISPRO 1 UNITS: 100 INJECTION, SOLUTION INTRAVENOUS; SUBCUTANEOUS at 21:48

## 2019-08-30 RX ADMIN — PREDNISOLONE ACETATE 1 DROP: 10 SUSPENSION/ DROPS OPHTHALMIC at 21:48

## 2019-08-30 RX ADMIN — TIMOLOL MALEATE 1 DROP: 2.5 SOLUTION OPHTHALMIC at 08:59

## 2019-08-30 RX ADMIN — CINACALCET HYDROCHLORIDE 30 MG: 30 TABLET, FILM COATED ORAL at 08:41

## 2019-08-30 RX ADMIN — TORSEMIDE 100 MG: 20 TABLET ORAL at 17:48

## 2019-08-30 RX ADMIN — INSULIN LISPRO 4 UNITS: 100 INJECTION, SOLUTION INTRAVENOUS; SUBCUTANEOUS at 08:48

## 2019-08-30 RX ADMIN — GABAPENTIN 100 MG: 100 CAPSULE ORAL at 08:42

## 2019-08-30 RX ADMIN — Medication 1 CAPSULE: at 17:48

## 2019-08-30 RX ADMIN — PREDNISOLONE ACETATE 1 DROP: 10 SUSPENSION/ DROPS OPHTHALMIC at 08:59

## 2019-08-30 RX ADMIN — NYSTATIN: 100000 POWDER TOPICAL at 08:58

## 2019-08-30 RX ADMIN — INSULIN LISPRO 4 UNITS: 100 INJECTION, SOLUTION INTRAVENOUS; SUBCUTANEOUS at 17:48

## 2019-08-30 RX ADMIN — MIDODRINE HYDROCHLORIDE 5 MG: 5 TABLET ORAL at 01:42

## 2019-08-30 RX ADMIN — LATANOPROST 1 DROP: 50 SOLUTION OPHTHALMIC at 21:48

## 2019-08-30 RX ADMIN — LEVOTHYROXINE SODIUM 50 MCG: 50 TABLET ORAL at 06:00

## 2019-08-30 NOTE — PROGRESS NOTES
NEPHROLOGY PROGRESS NOTE   Victor Manuel Casanova 48 y o  female MRN: [id number]  Unit/Bed#: [facility] 510-01 Encounter: [id number]      ASSESSMENT/PLAN:  1  End-stage renal disease: On hemodialysis Tuesday, Thursday and Saturday at [address]  ? Currently appears stable from a dialysis standpoint and next treatment will be tomorrow  ? Access:  Left upper extremity AV fistula with good bruit and thrill  2  Left heel wound:  Currently on vancomycin  Podiatry on board   ? Blood cultures negative times 72 hours  3  Hyperkalemia:  Potassium 5 8 on admission due to ESRD and hyperglycemia  Potassium has improved with dialysis  ? Low-potassium diet  ? Dialysis tomorrow  4  Mineral bone disease of CKD:  Continue Hectorol 1 mcg Q HD, Sensipar 30 mg daily and Renvela 3 tablets t i d  With meals  5  Anemia of CKD:  Hemoglobin 8 4 today  ? Not on Epogen due to history of PE/DVT  ? Hold Venofer due to infection  6  Hyponatremia:  Sodium 133 today  Continue fluid restriction and volume removal with dialysis  7  History of hypertension with hypotension overnight: could be from volume removal with HD as she had 5kg removed yesterday with treatment  · Home clonidine was d/c  · S/p 250cc IV bolus overnight   · May need to increase EDW to 121kg  · Decrease torsemide to 100mg bid   · Patient reports not taking her losartan on dialysis days or Coreg predialysis so will change medications accordingly      SUBJECTIVE:  Overnight was feeling weak and had hypotension  She was given a fluid bolus and now blood pressure has improved  OBJECTIVE:  Current Weight: Weight - Scale: 123 kg (271 lb 9 7 oz)  Vitals:    08/30/19 0741   BP: 141/63   Pulse: 69   Resp: 19   Temp: 98 3 °F (36 8 °C)   SpO2: 94%       Intake/Output Summary (Last 24 hours) at 8/30/2019 1025  Last data filed at 8/29/2019 1501  Gross per 24 hour   Intake 600 ml   Output 4671 ml   Net -4071 ml       General: no acute distress   Skin: no rash   Eyes: sclera anicteric   ENT: moist mucous membranes   Neck: supple, symmetric   Chest: clear to auscultation    CVS: regular rate and rhythm   Abdomen: soft, non-tender, non-distended  Extremities: no edema    Neuro: awake and alert  Psych: appropriate affect    Medications:  Scheduled Meds:    Current Facility-Administered Medications:  acetaminophen 650 mg Oral Q6H PRN Zach Miles MD    ALPRAZolam 0 25 mg Oral BID PRN Zach Miles MD    atorvastatin 20 mg Oral QPM Zach Miles MD    b complex-vitamin C-folic acid 1 capsule Oral Daily With Rosa De Los Santos MD    brimonidine 1 drop Both Eyes BID Zach Miles MD    carvedilol 25 mg Oral BID With Meals Zach Miles MD    cholecalciferol 2,000 Units Oral Daily Zach Miles MD    cinacalcet 30 mg Oral Daily Zach Miles MD    cyclobenzaprine 10 mg Oral HS Zach Miles MD    doxercalciferol 1 mcg Intravenous After Dialysis Heather Bragg PA-C    gabapentin 100 mg Oral TID Zach Miles MD    insulin glargine 30 Units Subcutaneous QAM Darlene Simon MD    insulin lispro 1-5 Units Subcutaneous TID [facility] Zach Miles MD    insulin lispro 1-5 Units Subcutaneous HS Zach Miles MD    insulin lispro 4 Units Subcutaneous TID With Meals Keesha MILLAN PA-C    latanoprost 1 drop Both Eyes HS Zach Miles MD    levothyroxine 50 mcg Oral Early Morning Zach Miles MD    loratadine 10 mg Oral Q48H Zach Miles MD    losartan 25 mg Oral Daily Zach Miles MD    melatonin 3 mg Oral HS Zach Miles MD    nystatin  Topical BID Darlene Simon MD    oxyCODONE 10 mg Oral Q6H PRN Zach Miles MD    oxyCODONE 5 mg Oral Q6H PRN Zach Miles MD    pantoprazole 40 mg Oral Daily Zach Miles MD    prednisoLONE acetate 1 drop Both Eyes 4x Daily Zach Miles MD    sertraline 50 mg Oral Daily Zach Miles MD    timolol 1 drop Ophthalmic BID Zach Miles MD    torsemide 200 mg Oral Q12H Zach Miles MD    vancomycin 10 mg/kg (Adjusted) Intravenous Daily PRN Zach Miles MD Last Rate: Stopped (08/29/19 1501)       PRN Meds:   acetaminophen    ALPRAZolam    doxercalciferol    oxyCODONE    oxyCODONE    vancomycin    Laboratory Results:  Results from last 7 days   Lab Units 08/30/19  0930 08/29/19  0548 08/28/19  0712 08/28/19  0605 08/27/19  0556 08/26/19  1154   WBC Thousand/uL 7 32 5 99  --  6 32 5 44 5 77   HEMOGLOBIN g/dL 8 4* 8 0*  --  8 3* 7 7* 8 3*   HEMATOCRIT % 26 2* 24 7*  --  26 1* 24 4* 25 3*   PLATELETS Thousands/uL 143* 137*  --  145* 130* 136*   SODIUM mmol/L 133* 134* 131*  --  136 132*   POTASSIUM mmol/L 4 7 5 3 4 8  --  6 3* 5 8*   CHLORIDE mmol/L 98* 97* 95*  --  100 97*   CO2 mmol/L 29 27 29  --  26 27   BUN mg/dL 47* 71* 44*  --  88* 58*   CREATININE mg/dL 6 45* 8 53* 6 80*  --  10 20* 9 26*   CALCIUM mg/dL 8 1* 7 6* 7 7*  --  7 8* 7 9*          Radiology Results:   MRI foot/forefoot toes left wo contrast   Final Result by Marquez Trejo MD (08/30 2208)      Minimal soft tissue edema lateral to the 5th MTP joint may represent cellulitis and/or post debridement changes  No abscess  No osteomyelitis  Incidentally noted dorsal lateral subcutaneous and proximal 4th intermetatarsal ganglion or varix measuring approximately 3 5 x 2 5 x 0 5 cm  Incidentally noted 3rd mid to distal intermetatarsal ganglion approximately 3 x 1 x 0 5 cm  Workstation performed: [id number]         XR foot 3+ vw left   Final Result by Ángel Flores MD (08/28 3694)      Soft tissue swelling in the region of the 5th metatarsophalangeal joint without radiographic evidence of osteomyelitis  Workstation performed: [id number]         XR ankle 3+ views LEFT   Final Result by Precious Chan DO (08/26 1300)      No acute osseous abnormality              Workstation performed: [id number]

## 2019-08-30 NOTE — DISCHARGE INSTRUCTIONS
Discharge Instructions - Podiatry    Weight Bearing Status:   Weight-bearing as tolerated in heel offloading shoe  Pain: Continue analgesics as directed    F/u appointment Instructions: Please make an appointment within one week of discharge with Dr Yu Sommers  Contact sooner if any increase in pain, or signs of infection occur  Wound Care:  Acute wound dressings dry clean and intact  Apply Maxorb Silver to the heel ulceration, gauze 4x4s, Kerlix wrap, and cover with Ace wrap

## 2019-08-30 NOTE — ASSESSMENT & PLAN NOTE
Lab Results   Component Value Date    HGBA1C 10 4 (H) 08/27/2019       Recent Labs     08/29/19  2324 08/30/19  0700 08/30/19  1126 08/30/19  1601   POCGLU 170* 162* 130 98       Blood Sugar Average: Last 72 hrs:  (P) 886 [id number]   Not well controlled as noted by HbA1c  On 28 units Lantus at home  Continue basal insulin  Insulin sliding scale and Accu-Cheks    HbA1c elevated, will add meal coverage  Adjust the dose of insulin since the blood sugar is consistently above goal range

## 2019-08-30 NOTE — PROGRESS NOTES
Progress Note Colin Martinez 1965, 48 y o  female MRN: [id number]    Unit/Bed#: [facility] 510-01 Encounter: [id number]    Primary Care Provider: Tho Abraham MD   Date and time admitted to hospital: 8/26/2019 10:23 AM        * Diabetic foot ulcer ([facility] [address] )  Assessment & Plan  · POA  · No fever or leukocytosis  Elevated CRP noted  · X-ray obtain in ER, shows No acute osseous abnormality  · Left heel wound/ ulcer with pus like discharge and significant tenderness-last podiatry note appreciated and planning for debridement once the INR  improves  · Started on vancomycin, cefepime and Flagyl in ER, discontinue cefepime and Flagyl per Podiatry recommendations, continue vancomycin  · Podiatry consulted, status post wound debridement  X-ray is negative for any acute osteomyelitis  · Sed rate elevated at 81  · Blood cultures pending-no growth at 72 hours  · Status post wound debridement  No need for antibiotics per Podiatry since the wound is not infected      Hemodialysis-associated hypotension  Assessment & Plan  · Status post IV fluids last night  Monitor blood pressure    Candidiasis of breast  Assessment & Plan  · Patient with cutaneous candidiasis under the left breast  · Nystatin    Anemia in end-stage renal disease (HCC)  Assessment & Plan  Baseline hemoglobin seems between 8-10  Hemoglobin at baseline  Trend CBC  Type 2 diabetes mellitus [facility])  Assessment & Plan  Lab Results   Component Value Date    HGBA1C 10 4 (H) 08/27/2019       Recent Labs     08/29/19  2324 08/30/19  0700 08/30/19  1126 08/30/19  1601   POCGLU 170* 162* 130 98       Blood Sugar Average: Last 72 hrs:  (P) 291 [id number]   Not well controlled as noted by HbA1c  On 28 units Lantus at home  Continue basal insulin  Insulin sliding scale and Accu-Cheks    HbA1c elevated, will add meal coverage  Adjust the dose of insulin since the blood sugar is consistently above goal range    ESRD on hemodialysis [facility])  Assessment & Plan  On  schedule  Had dialysis today    History of DVT (deep vein thrombosis)  Assessment & Plan  · On Coumadin  · INR in ER which is elevated at 7   · No active signs of bleeding  · Hemoglobin 7 7 which is her baseline  · Restart back on the Coumadin        VTE Pharmacologic Prophylaxis:   Pharmacologic: Warfarin (Coumadin)  Mechanical VTE Prophylaxis in Place: Yes    Patient Centered Rounds: I have performed bedside rounds with nursing staff today  Discussions with Specialists or Other Care Team Provider:  Nephrology    Education and Discussions with Family / Patient:  Patient    Time Spent for Care: 30 minutes  More than 50% of total time spent on counseling and coordination of care as described above  Current Length of Stay: 4 day(s)    Current Patient Status: Inpatient   Certification Statement: The patient will continue to require additional inpatient hospital stay due to Monitoring blood pressure    Discharge Plan:     Code Status: Level 1 - Full Code      Subjective:   Patient seen and examined  No specific complaints offered  Discussed with the patient about podiatry recommendation of no antibiotics  Status post wound debridement    Objective:     Vitals:   Temp (24hrs), Av 3 °F (36 8 °C), Min:98 °F (36 7 °C), Max:98 6 °F (37 °C)    Temp:  [98 °F (36 7 °C)-98 6 °F (37 °C)] 98 °F (36 7 °C)  HR:  [69-77] 76  Resp:  [18-20] 20  BP: ()/(30-68) 136/68  SpO2:  [94 %-96 %] 96 %  Body mass index is 45 2 kg/m²  Input and Output Summary (last 24 hours): Intake/Output Summary (Last 24 hours) at 2019 1813  Last data filed at 2019 1200  Gross per 24 hour   Intake 420 ml   Output    Net 420 ml       Physical Exam:     Physical Exam   Constitutional: No distress  HENT:   Head: Normocephalic and atraumatic  Eyes: Right eye exhibits no discharge  Neck: No JVD present  Cardiovascular: Normal rate and regular rhythm  No murmur heard    Pulmonary/Chest: Effort normal  No stridor  No respiratory distress  Abdominal: Soft  She exhibits no mass  Musculoskeletal: Normal range of motion  She exhibits no edema  Neurological: She is alert  Coordination normal    Skin: Skin is warm  She is not diaphoretic  Additional Data:     Labs:    Results from last 7 days   Lab Units 08/30/19  0930   WBC Thousand/uL 7 32   HEMOGLOBIN g/dL 8 4*   HEMATOCRIT % 26 2*   PLATELETS Thousands/uL 143*   NEUTROS PCT % 68   LYMPHS PCT % 17   MONOS PCT % 10   EOS PCT % 3     Results from last 7 days   Lab Units 08/30/19  0930 08/29/19  0548   POTASSIUM mmol/L 4 7 5 3   CHLORIDE mmol/L 98* 97*   CO2 mmol/L 29 27   BUN mg/dL 47* 71*   CREATININE mg/dL 6 45* 8 53*   CALCIUM mg/dL 8 1* 7 6*   ALK PHOS U/L  --  216*   ALT U/L  --  17   AST U/L  --  16     Results from last 7 days   Lab Units 08/29/19  0548   INR  2 28*       * I Have Reviewed All Lab Data Listed Above  * Additional Pertinent Lab Tests Reviewed: [place] 68 Admission Reviewed    Imaging:    Imaging Reports Reviewed Today Include:   Imaging Personally Reviewed by Myself Includes:      Recent Cultures (last 7 days):     Results from last 7 days   Lab Units 08/27/19  1347 08/26/19  1309   BLOOD CULTURE   --  No Growth After 4 Days  No Growth After 4 Days     GRAM STAIN RESULT  Rare Polys*  2+ Gram positive cocci in pairs*  --    WOUND CULTURE  2+ Growth of Methicillin Resistant Staphylococcus aureus*  2+ Growth of Beta Hemolytic Streptococcus Group B*  --        Last 24 Hours Medication List:     Current Facility-Administered Medications:  acetaminophen 650 mg Oral Q6H PRN Costa Lucero MD   ALPRAZolam 0 25 mg Oral BID PRN Costa Lucero MD   atorvastatin 20 mg Oral QPM Costa Lucero MD   b complex-vitamin C-folic acid 1 capsule Oral Daily With Juliana Stafford MD   brimonidine 1 drop Both Eyes BID Costa Lucero MD   carvedilol 25 mg Oral BID With Meals Lin Kim DO   cholecalciferol 2,000 Units Oral Daily Molly Woods MD   cinacalcet 30 mg Oral Daily Molly Woods MD   cyclobenzaprine 10 mg Oral HS Molly Woods MD   doxercalciferol 1 mcg Intravenous After Dialysis Vidya George PA-C   gabapentin 100 mg Oral TID Molly Woods MD   insulin glargine 30 Units Subcutaneous QAM Damion Hicks MD   insulin lispro 1-5 Units Subcutaneous TID [facility] Molly Woods MD   insulin lispro 1-5 Units Subcutaneous HS Molly Woods MD   insulin lispro 4 Units Subcutaneous TID With Meals Keesha MILLAN PA-C   latanoprost 1 drop Both Eyes HS Molly Woods MD   levothyroxine 50 mcg Oral Early Morning Molly Woods MD   loratadine 10 mg Oral Q48H Molly Woods MD   [START ON 9/1/2019] losartan 25 mg Oral Once per day on Sun Mon Wed Fri Vidya George PA-C   melatonin 3 mg Oral HS Molly Woods MD   nystatin  Topical BID Damion Hicks MD   oxyCODONE 10 mg Oral Q6H PRN Molly Woods MD   oxyCODONE 5 mg Oral Q6H PRN Molly Woods MD   pantoprazole 40 mg Oral Daily Molly Woods MD   prednisoLONE acetate 1 drop Both Eyes 4x Daily Molly Woods MD   sertraline 50 mg Oral Daily Molly Woods MD   timolol 1 drop Ophthalmic BID Molly Woods MD   torsemide 100 mg Oral Q12H Lin Davidson DO   warfarin 10 mg Oral Once (warfarin) Damion Hicks MD        Today, Patient Was Seen By: Damion Hicks MD    ** Please Note: Dictation voice to text software may have been used in the creation of this document   **

## 2019-08-30 NOTE — SOCIAL WORK
CM met with Pt to discuss the post [facility] care [facility]  Discussed Freedom of Choice with patient  List of agencies given to patient via in person  patient aware the list is custom filtered for them by preference  and that [facility] post acute providers are designated  Pt gave CM permission to make a referral to [facility] for RN services for wound care   CM made the requested referral

## 2019-08-30 NOTE — PROGRESS NOTES
Pt's manual bp @ 2300 70/30  Pt asymptomatic  Nephrology ordered 250ml bolus  Will continue to monitor and recheck BP  JEAN PIERRE Hair taking over pt's assignment  RN aware

## 2019-08-30 NOTE — ASSESSMENT & PLAN NOTE
· POA  · No fever or leukocytosis  Elevated CRP noted  · X-ray obtain in ER, shows No acute osseous abnormality  · Left heel wound/ ulcer with pus like discharge and significant tenderness-last podiatry note appreciated and planning for debridement once the INR  improves  · Started on vancomycin, cefepime and Flagyl in ER, discontinue cefepime and Flagyl per Podiatry recommendations, continue vancomycin  · Podiatry consulted, status post wound debridement  X-ray is negative for any acute osteomyelitis  · Sed rate elevated at 81  · Blood cultures pending-no growth at 72 hours  · Status post wound debridement    No need for antibiotics per Podiatry since the wound is not infected

## 2019-08-30 NOTE — ASSESSMENT & PLAN NOTE
· On Coumadin  · INR in ER which is elevated at 7   · No active signs of bleeding  · Hemoglobin 7 7 which is her baseline    · Restart back on the Coumadin

## 2019-08-30 NOTE — ORTHOTIC NOTE
Orthotic Note            Date: 8/30/2019      Patient Name: Herber Almanza            Reason for Consult:  Patient Active Problem List   Diagnosis    Benign hypertension with end-stage renal disease ([facility] )    History of DVT (deep vein thrombosis)    Abnormal uterine bleeding    Glaucoma    ESRD on hemodialysis ([facility] )    Anxiety disorder    Type 2 diabetes mellitus ([place] [address] )    Knee pain    Ambulatory dysfunction    Anemia in end-stage renal disease ([place] [address] )    Hemodialysis status ([place] [address] )    Primary osteoarthritis of right knee    Colon cancer screening    Gastroesophageal reflux disease    Meningioma (HCC)    Nausea & vomiting    Secondary hyperparathyroidism of renal origin ([facility] )    Other chest pain    Hyperkalemia    Chronic anemia    Diabetic foot ulcer (HCC)    Hyponatremia    Parenchymal renal hypertension    Candidiasis of breast    Hemodialysis-associated hypotension   [employer] GloboPed Shoe     [employer] delivered, fit, and donned a [employer] GloboPed Shoe onto pt's LLE while sitting EOB  Sized and measured pt to a size Small for optimal fit and comfort  Pt tolerated fitting well  Educated pt on proper donning, doffing, and cleaning instructions  Pt currently without questions or concerns at this time  RN aware and will continue to follow up  Recommendations:  Please call [employer] ext [phone] in regards to any bracing instructions and/or adjustments       [address] [facility] Restorative Technician, BS

## 2019-08-30 NOTE — PROGRESS NOTES
Progress Note - Podiatry  Cristina Turner 48 y o  female MRN: [id number]  Unit/Bed#: [facility] 510-01 Encounter: [id number]    Assessment:    1  Diabetic wounds of the left 5th digit and posterior heel  2  supratherapeutic INR has stabilized and is adequate for debridement  3  T2DM  4  ESRD    Plan:  · Left 5th digit xray and MRI negative for osteomyelitis  · Left posterior callus debrided revealing ulcer with negative probe to bone  · Pre-debridement wound measures 0 cm  Following a time out and verbal consent, Excisional debridement was performed with a #15 blade to remove non-viable necrotic tissue, skin and fat  Wound was debrided down to the level of subcutaneus tissue  Hemostasis was controlled with pressure  pain noted during procedure and tolerated well  Post debridement measures 2 5 cm x 0 8 cm x 0 3 cm  for a total of less than 10 square centimeters  Wound appears primarily granular with fibrous portion medially  · Left 5th digit callus selectively debrided with no purlence noted  · She is stable for discharge from the perspective of podiatry  · She should continue to offload heels in prevalon boots to aid in healing  · She was prescribed heel offloading shoe to aide in ambulation and help with pain  · Left posterior heel wound dressed with acticoat, Maxorb, allevyn heel  · Left 5th digit dressed with betadine DSD  · Will confirm this plan with my attending    Subjective/Objective   Chief Complaint:   Chief Complaint   Patient presents with    Leg Pain     Patient presents to the E R  with leg and foot pain  Subjective: 48 y o  y/o female was seen and evaluated at bedside  NAD noted with procedure  Debridement was painful but tolerated without aid of anesthetics    Blood pressure 141/63, pulse 69, temperature 98 3 °F (36 8 °C), temperature source Oral, resp  rate 19, height 5' 5 98" (1 676 m), weight 123 kg (271 lb 9 7 oz), last menstrual period 02/12/2016, SpO2 94 %, not currently breastfeeding  ,Body mass index is 45 2 kg/m²  Invasive Devices     Peripheral Intravenous Line            Peripheral IV 08/26/19 Dorsal (posterior); Proximal;Right Forearm 3 days          Line            Hemodialysis AV Fistula 02/20/18 Left Forearm 555 days                Physical Exam:   General: Alert, cooperative and no distress  Lungs: Non labored breathing  Abdomen: Soft, non-tender  Extremity:     NVS at baseline B/l  MSK function at baseline B/l  No calf tenderness noted B/l  Left posterior heel callus was macerated and showed underlying ulceration  No surrounding cellulitis noted  No odor noted with debridement or dressing change  Left 5th digit appears stable with no odor, purulence or periwound erythema noted today  Left 5th posterior heel wound      Left 5th digit wound      Left posterior heel pre-debridement      Lab, Imaging and other studies:   I have personally reviewed pertinent lab results  , CBC:   Lab Results   Component Value Date    WBC 7 32 08/30/2019    HGB 8 4 (L) 08/30/2019    HCT 26 2 (L) 08/30/2019    MCV 95 08/30/2019     (L) 08/30/2019    MCH 30 4 08/30/2019    MCHC 32 1 08/30/2019    RDW 14 2 08/30/2019    MPV 11 0 08/30/2019    NRBC 0 08/30/2019   , CMP:   Lab Results   Component Value Date    SODIUM 133 (L) 08/30/2019    K 4 7 08/30/2019    CL 98 (L) 08/30/2019    CO2 29 08/30/2019    BUN 47 (H) 08/30/2019    CREATININE 6 45 (H) 08/30/2019    CALCIUM 8 1 (L) 08/30/2019    EGFR 7 08/30/2019   , Coagulation: No results found for: PT, INR, APTT    Imaging: I have personally reviewed pertinent films in PACS  EKG, Pathology, and Other Studies: I have personally reviewed pertinent reports  Portions of the record may have been created with voice recognition software  Occasional wrong word or "sound a like" substitutions may have occurred due to the inherent limitations of voice recognition software   Read the chart carefully and recognize, using context, where substitutions have occurred

## 2019-08-30 NOTE — PLAN OF CARE
Problem: Potential for Falls  Goal: Patient will remain free of falls  Description  INTERVENTIONS:  - Assess patient frequently for physical needs  -  Identify cognitive and physical deficits and behaviors that affect risk of falls    -  [place] fall precautions as indicated by assessment   - Educate patient/family on patient safety including physical limitations  - Instruct patient to call for assistance with activity based on assessment  - Modify environment to reduce risk of injury  - Consider OT/PT consult to assist with strengthening/mobility  Outcome: Progressing     Problem: PAIN - ADULT  Goal: Verbalizes/displays adequate comfort level or baseline comfort level  Description  Interventions:  - Encourage patient to monitor pain and request assistance  - Assess pain using appropriate pain scale  - Administer analgesics based on type and severity of pain and evaluate response  - Implement non-pharmacological measures as appropriate and evaluate response  - Consider cultural and social influences on pain and pain management  - Notify physician/advanced practitioner if interventions unsuccessful or patient reports new pain  Outcome: Progressing     Problem: METABOLIC, FLUID AND ELECTROLYTES - ADULT  Goal: Electrolytes maintained within normal limits  Description  INTERVENTIONS:  - Monitor labs and assess patient for signs and symptoms of electrolyte imbalances  - Administer electrolyte replacement as ordered  - Monitor response to electrolyte replacements, including repeat lab results as appropriate  - Instruct patient on fluid and nutrition as appropriate  Outcome: Progressing  Goal: Fluid balance maintained  Description  INTERVENTIONS:  - Monitor labs   - Monitor I/O and WT  - Instruct patient on fluid and nutrition as appropriate  - Assess for signs & symptoms of volume excess or deficit  Outcome: Progressing  Goal: Glucose maintained within target range  Description  INTERVENTIONS:  - Monitor Blood Glucose as ordered  - Assess for signs and symptoms of hyperglycemia and hypoglycemia  - Administer ordered medications to maintain glucose within target range  - Assess nutritional intake and initiate nutrition service referral as needed  Outcome: Progressing     Problem: CARDIOVASCULAR - ADULT  Goal: Maintains optimal cardiac output and hemodynamic stability  Description  INTERVENTIONS:  - Monitor I/O, vital signs and rhythm  - Monitor for S/S and trends of decreased cardiac output  - Administer and titrate ordered vasoactive medications to optimize hemodynamic stability  - Assess quality of pulses, skin color and temperature  - Assess for signs of decreased coronary artery perfusion  - Instruct patient to report change in severity of symptoms  Outcome: Progressing  Goal: Absence of cardiac dysrhythmias or at baseline rhythm  Description  INTERVENTIONS:  - Continuous cardiac monitoring, vital signs, obtain 12 lead EKG if ordered  - Administer antiarrhythmic and heart rate control medications as ordered  - Monitor electrolytes and administer replacement therapy as ordered  Outcome: Progressing     Problem: SKIN/TISSUE INTEGRITY - ADULT  Goal: Skin integrity remains intact  Description  INTERVENTIONS  - Identify patients at risk for skin breakdown  - Assess and monitor skin integrity  - Assess and monitor nutrition and hydration status  - Monitor labs (i e  albumin)  - Assess for incontinence   - Turn and reposition patient  - Assist with mobility/ambulation  - Relieve pressure over bony prominences  - Avoid friction and shearing  - Provide appropriate hygiene as needed including keeping skin clean and dry  - Evaluate need for skin moisturizer/barrier cream  - Collaborate with interdisciplinary team (i e  Nutrition, Rehabilitation, etc )   - Patient/family teaching  Outcome: Progressing  Goal: Incision(s), wounds(s) or drain site(s) healing without S/S of infection  Description  INTERVENTIONS  - Assess and document risk factors for skin impairment   - Assess and document dressing, incision, wound bed, drain sites and surrounding tissue  - Consider nutrition services referral as needed  - Oral mucous membranes remain intact  - Provide patient/ family education  Outcome: Progressing  Goal: Oral mucous membranes remain intact  Description  INTERVENTIONS  - Assess oral mucosa and hygiene practices  - Implement preventative oral hygiene regimen  - Implement oral medicated treatments as ordered  - Initiate Nutrition services referral as needed  Outcome: Progressing     Problem: MUSCULOSKELETAL - ADULT  Goal: Maintain or return mobility to safest level of function  Description  INTERVENTIONS:  - Assess patient's ability to carry out ADLs; assess patient's baseline for ADL function and identify physical deficits which impact ability to perform ADLs (bathing, care of mouth/teeth, toileting, grooming, dressing, etc )  - Assess/evaluate cause of self-care deficits   - Assess range of motion  - Assess patient's mobility  - Assess patient's need for assistive devices and provide as appropriate  - Encourage maximum independence but intervene and supervise when necessary  - Involve family in performance of ADLs  - Assess for home care needs following discharge   - Consider OT consult to assist with ADL evaluation and planning for discharge  - Provide patient education as appropriate  Outcome: Progressing  Goal: Maintain proper alignment of affected body part  Description  INTERVENTIONS:  - Support, maintain and protect limb and body alignment  - Provide patient/ family with appropriate education  Outcome: Progressing     Problem: INFECTION - ADULT  Goal: Absence or prevention of progression during hospitalization  Description  INTERVENTIONS:  - Assess and monitor for signs and symptoms of infection  - Monitor lab/diagnostic results  - Monitor all insertion sites, i e  indwelling lines, tubes, and drains  - Monitor endotracheal if appropriate and nasal secretions for changes in amount and color  - [place] appropriate cooling/warming therapies per order  - Administer medications as ordered  - Instruct and encourage patient and family to use good hand hygiene technique  - Identify and instruct in appropriate isolation precautions for identified infection/condition  Outcome: Progressing  Goal: Absence of fever/infection during neutropenic period  Description  INTERVENTIONS:  - Monitor WBC    Outcome: Progressing     Problem: SAFETY ADULT  Goal: Maintain or return to baseline ADL function  Description  INTERVENTIONS:  -  Assess patient's ability to carry out ADLs; assess patient's baseline for ADL function and identify physical deficits which impact ability to perform ADLs (bathing, care of mouth/teeth, toileting, grooming, dressing, etc )  - Assess/evaluate cause of self-care deficits   - Assess range of motion  - Assess patient's mobility; develop plan if impaired  - Assess patient's need for assistive devices and provide as appropriate  - Encourage maximum independence but intervene and supervise when necessary  - Involve family in performance of ADLs  - Assess for home care needs following discharge   - Consider OT consult to assist with ADL evaluation and planning for discharge  - Provide patient education as appropriate  Outcome: Progressing  Goal: Maintain or return mobility status to optimal level  Description  INTERVENTIONS:  - Assess patient's baseline mobility status (ambulation, transfers, stairs, etc )    - Identify cognitive and physical deficits and behaviors that affect mobility  - Identify mobility aids required to assist with transfers and/or ambulation (gait belt, sit-to-stand, lift, walker, cane, etc )  - [place] fall precautions as indicated by assessment  - Record patient progress and toleration of activity level on Mobility SBAR; progress patient to next Phase/Stage  - Instruct patient to call for assistance with activity based on assessment  - Consider rehabilitation consult to assist with strengthening/weightbearing, etc   Outcome: Progressing     Problem: Nutrition/Hydration-ADULT  Goal: Nutrient/Hydration intake appropriate for improving, restoring or maintaining nutritional needs  Description  Monitor and assess patient's nutrition/hydration status for malnutrition  Collaborate with interdisciplinary team and initiate plan and interventions as ordered  Monitor patient's weight and dietary intake as ordered or per policy  Utilize nutrition screening tool and intervene as necessary  Determine patient's food preferences and provide high-protein, high-caloric foods as appropriate       INTERVENTIONS:  - Monitor oral intake, urinary output, labs, and treatment plans  - Assess nutrition and hydration status and recommend course of action  - Evaluate amount of meals eaten  - Assist patient with eating if necessary   - Allow adequate time for meals  - Recommend/ encourage appropriate diets, oral nutritional supplements, and vitamin/mineral supplements  - Order, calculate, and assess calorie counts as needed  - Recommend, monitor, and adjust tube feedings and TPN/PPN based on assessed needs  - Assess need for intravenous fluids  - Provide specific nutrition/hydration education as appropriate  - Include patient/family/caregiver in decisions related to nutrition  Outcome: Progressing

## 2019-08-30 NOTE — TREATMENT PLAN
Renal    Called by nurse for the 2nd time for hypotension  Currently getting 250 cc bolus from earlier, still low and dizzy, will give another 250 cc  Midodrine 5 mg x 1  Stop Clonidine

## 2019-08-31 ENCOUNTER — APPOINTMENT (INPATIENT)
Dept: DIALYSIS | Facility: HOSPITAL | Age: 54
DRG: 623 | End: 2019-08-31
Payer: MEDICARE

## 2019-08-31 VITALS
HEART RATE: 78 BPM | TEMPERATURE: 97.8 F | SYSTOLIC BLOOD PRESSURE: 117 MMHG | BODY MASS INDEX: 43.65 KG/M2 | HEIGHT: 66 IN | OXYGEN SATURATION: 96 % | RESPIRATION RATE: 18 BRPM | WEIGHT: 271.61 LBS | DIASTOLIC BLOOD PRESSURE: 55 MMHG

## 2019-08-31 LAB
ANION GAP SERPL CALCULATED.3IONS-SCNC: 10 MMOL/L (ref 4–13)
BACTERIA BLD CULT: NORMAL
BACTERIA BLD CULT: NORMAL
BASOPHILS # BLD AUTO: 0.04 THOUSANDS/ΜL (ref 0–0.1)
BASOPHILS NFR BLD AUTO: 1 % (ref 0–1)
BUN SERPL-MCNC: 69 MG/DL (ref 5–25)
CALCIUM SERPL-MCNC: 8 MG/DL (ref 8.3–10.1)
CHLORIDE SERPL-SCNC: 97 MMOL/L (ref 100–108)
CO2 SERPL-SCNC: 26 MMOL/L (ref 21–32)
CREAT SERPL-MCNC: 8.17 MG/DL (ref 0.6–1.3)
EOSINOPHIL # BLD AUTO: 0.19 THOUSAND/ΜL (ref 0–0.61)
EOSINOPHIL NFR BLD AUTO: 3 % (ref 0–6)
ERYTHROCYTE [DISTWIDTH] IN BLOOD BY AUTOMATED COUNT: 14.2 % (ref 11.6–15.1)
GFR SERPL CREATININE-BSD FRML MDRD: 5 ML/MIN/1.73SQ M
GLUCOSE SERPL-MCNC: 115 MG/DL (ref 65–140)
GLUCOSE SERPL-MCNC: 144 MG/DL (ref 65–140)
GLUCOSE SERPL-MCNC: 162 MG/DL (ref 65–140)
HCT VFR BLD AUTO: 25.1 % (ref 34.8–46.1)
HGB BLD-MCNC: 8 G/DL (ref 11.5–15.4)
IMM GRANULOCYTES # BLD AUTO: 0.03 THOUSAND/UL (ref 0–0.2)
IMM GRANULOCYTES NFR BLD AUTO: 0 % (ref 0–2)
INR PPP: 1.29 (ref 0.84–1.19)
LYMPHOCYTES # BLD AUTO: 1.33 THOUSANDS/ΜL (ref 0.6–4.47)
LYMPHOCYTES NFR BLD AUTO: 18 % (ref 14–44)
MCH RBC QN AUTO: 30.5 PG (ref 26.8–34.3)
MCHC RBC AUTO-ENTMCNC: 31.9 G/DL (ref 31.4–37.4)
MCV RBC AUTO: 96 FL (ref 82–98)
MONOCYTES # BLD AUTO: 0.69 THOUSAND/ΜL (ref 0.17–1.22)
MONOCYTES NFR BLD AUTO: 9 % (ref 4–12)
NEUTROPHILS # BLD AUTO: 5.28 THOUSANDS/ΜL (ref 1.85–7.62)
NEUTS SEG NFR BLD AUTO: 69 % (ref 43–75)
NRBC BLD AUTO-RTO: 0 /100 WBCS
PLATELET # BLD AUTO: 158 THOUSANDS/UL (ref 149–390)
PMV BLD AUTO: 11.1 FL (ref 8.9–12.7)
POTASSIUM SERPL-SCNC: 5 MMOL/L (ref 3.5–5.3)
PROTHROMBIN TIME: 15.7 SECONDS (ref 11.6–14.5)
RBC # BLD AUTO: 2.62 MILLION/UL (ref 3.81–5.12)
SODIUM SERPL-SCNC: 133 MMOL/L (ref 136–145)
VANCOMYCIN SERPL-MCNC: 20.1 UG/ML
WBC # BLD AUTO: 7.56 THOUSAND/UL (ref 4.31–10.16)

## 2019-08-31 PROCEDURE — 80048 BASIC METABOLIC PNL TOTAL CA: CPT | Performed by: INTERNAL MEDICINE

## 2019-08-31 PROCEDURE — 80202 ASSAY OF VANCOMYCIN: CPT | Performed by: FAMILY MEDICINE

## 2019-08-31 PROCEDURE — 85025 COMPLETE CBC W/AUTO DIFF WBC: CPT | Performed by: INTERNAL MEDICINE

## 2019-08-31 PROCEDURE — 85610 PROTHROMBIN TIME: CPT | Performed by: FAMILY MEDICINE

## 2019-08-31 PROCEDURE — 90935 HEMODIALYSIS ONE EVALUATION: CPT | Performed by: INTERNAL MEDICINE

## 2019-08-31 PROCEDURE — 82948 REAGENT STRIP/BLOOD GLUCOSE: CPT

## 2019-08-31 PROCEDURE — 99239 HOSP IP/OBS DSCHRG MGMT >30: CPT | Performed by: FAMILY MEDICINE

## 2019-08-31 RX ORDER — INSULIN GLARGINE 100 [IU]/ML
30 INJECTION, SOLUTION SUBCUTANEOUS
Refills: 0 | Status: ON HOLD | OUTPATIENT
Start: 2019-08-31 | End: 2021-06-11 | Stop reason: SDUPTHER

## 2019-08-31 RX ORDER — OXYCODONE HYDROCHLORIDE 5 MG/1
5 TABLET ORAL EVERY 6 HOURS PRN
Qty: 10 TABLET | Refills: 0 | Status: SHIPPED | OUTPATIENT
Start: 2019-08-31 | End: 2019-09-10

## 2019-08-31 RX ORDER — NYSTATIN 100000 [USP'U]/G
POWDER TOPICAL 2 TIMES DAILY
Qty: 15 G | Refills: 0 | Status: SHIPPED | OUTPATIENT
Start: 2019-08-31

## 2019-08-31 RX ADMIN — INSULIN GLARGINE 30 UNITS: 100 INJECTION, SOLUTION SUBCUTANEOUS at 13:04

## 2019-08-31 RX ADMIN — TIMOLOL MALEATE 1 DROP: 2.5 SOLUTION OPHTHALMIC at 08:38

## 2019-08-31 RX ADMIN — LEVOTHYROXINE SODIUM 50 MCG: 50 TABLET ORAL at 05:13

## 2019-08-31 RX ADMIN — DOXERCALCIFEROL 1 MCG: 4 INJECTION, SOLUTION INTRAVENOUS at 09:06

## 2019-08-31 RX ADMIN — OXYCODONE HYDROCHLORIDE 5 MG: 5 TABLET ORAL at 13:44

## 2019-08-31 RX ADMIN — VITAMIN D, TAB 1000IU (100/BT) 2000 UNITS: 25 TAB at 13:04

## 2019-08-31 RX ADMIN — PANTOPRAZOLE SODIUM 40 MG: 40 TABLET, DELAYED RELEASE ORAL at 05:13

## 2019-08-31 RX ADMIN — INSULIN LISPRO 1 UNITS: 100 INJECTION, SOLUTION INTRAVENOUS; SUBCUTANEOUS at 13:05

## 2019-08-31 RX ADMIN — PREDNISOLONE ACETATE 1 DROP: 10 SUSPENSION/ DROPS OPHTHALMIC at 08:38

## 2019-08-31 RX ADMIN — ACETAMINOPHEN 650 MG: 325 TABLET ORAL at 10:37

## 2019-08-31 RX ADMIN — SERTRALINE HYDROCHLORIDE 50 MG: 50 TABLET ORAL at 13:04

## 2019-08-31 RX ADMIN — GABAPENTIN 100 MG: 100 CAPSULE ORAL at 13:04

## 2019-08-31 RX ADMIN — BRIMONIDINE TARTRATE 1 DROP: 1.5 SOLUTION OPHTHALMIC at 08:38

## 2019-08-31 RX ADMIN — ALPRAZOLAM 0.25 MG: 0.25 TABLET ORAL at 08:37

## 2019-08-31 NOTE — PROGRESS NOTES
Procedure Note - Nephrology   Pearl Henry 48 y o  female MRN: [id number]  Unit/Bed#: [facility] 510-01 Encounter: [id number]    Procedure note-hemodialysis(33103)  Assessment / Plan:  1  End-stage renal disease on HD TTS at [facility]  -seen by me on hemodialysis today  2  Hypotension - ? D/t volume removal vs receiving antihypertensives prior to HD sessions  I have adjusted medications to ensure no antihypertensives on HD days  Continue carvedilol 25 mg p o  B i d  But held on dialysis days, losartan 25 mg on non dialysis days, torsemide 100 mg p o  Q 12 hours on non dialysis days  Please check BP over wrist  I suspect arm readings inaccurate as patient asymptomatic  3  Access-left upper extremity AV fistula well-functioning  4  Left heel wound-on Vancomycin, podiatry follows  5  Secondary hyperparathyroidism of renal origin-continue Hectorol with dialysis sessions, Sensipar, Renvela with meals  6  Anemia of CKD-not on Epogen due to history of PE/DVT, holding Venofer due to infection, monitor CBC, Hgb 8  7  Hyponatremia likely dilutional-correct with UF on HD, continue 1 8 L fluid restriction      Subjective:   Patient seen & examined by me on hemodialysis at approximately 10:05 a m     Blood pressure 81/35 using cuff over right upper arm  Patient states her blood pressures normally taken over her wrist but she has an IV diuretic now  UF goal 2 L, blood flow 350 mL/min, dialysate flow 600 mL/min, patient tolerating dialysis well  Objective:     Vitals: Blood pressure (!) 85/46, pulse 76, temperature 97 8 °F (36 6 °C), temperature source Oral, resp  rate 17, height 5' 5 98" (1 676 m), weight 123 kg (271 lb 9 7 oz), last menstrual period 2016, SpO2 91 %, not currently breastfeeding  ,Body mass index is 45 2 kg/m²  Temp (24hrs), Av 1 °F (36 7 °C), Min:97 7 °F (36 5 °C), Max:98 5 °F (36 9 °C)      Weight (last 2 days)     Date/Time   Weight    19 0601   123 (271 61)                Intake/Output Summary (Last 24 hours) at 8/31/2019 1247  Last data filed at 8/31/2019 1231  Gross per 24 hour   Intake 1420 ml   Output 2509 ml   Net -1089 ml     I/O last 24 hours: In: 5929 [P O :1240; I V :600]  Out: 2509 [Other:2509]        Physical Exam:   Physical Exam   Constitutional: She appears well-developed and well-nourished  No distress  obese   HENT:   Head: Normocephalic and atraumatic  Mouth/Throat: No oropharyngeal exudate  Eyes: Right eye exhibits no discharge  Left eye exhibits no discharge  No scleral icterus  Neck: Normal range of motion  Neck supple  No thyromegaly present  Cardiovascular: Normal rate and regular rhythm  No murmur heard  Pulmonary/Chest: Effort normal and breath sounds normal  No respiratory distress  She has no wheezes  Abdominal: Soft  Bowel sounds are normal  She exhibits no distension  Musculoskeletal: She exhibits edema (trace b/l LE)  Neurological: She is alert  She exhibits normal muscle tone  awake   Skin: Skin is warm and dry  No rash noted  She is not diaphoretic  Psychiatric: She has a normal mood and affect  Her behavior is normal    Nursing note and vitals reviewed  Invasive Devices     Peripheral Intravenous Line            Peripheral IV 08/26/19 Dorsal (posterior); Proximal;Right Forearm 4 days          Line            Hemodialysis AV Fistula 02/20/18 Left Forearm 556 days                Medications:    Scheduled Meds:  Current Facility-Administered Medications:  acetaminophen 650 mg Oral Q6H PRN Maxi Larsen MD   ALPRAZolam 0 25 mg Oral BID PRN Maxi Larsen MD   atorvastatin 20 mg Oral QPM Maxi Larsen MD   b complex-vitamin C-folic acid 1 capsule Oral Daily With Danish Cortez MD   brimonidine 1 drop Both Eyes BID Maxi Larsen MD   carvedilol 25 mg Oral BID With Meals Lin Sheikh DO   cholecalciferol 2,000 Units Oral Daily Maxi Larsen MD   cinacalcet 30 mg Oral Daily Maxi Larsen MD   cyclobenzaprine 10 mg Oral HS Max Poke M Violette Middleton MD   doxercalciferol 1 mcg Intravenous After Dialysis Fernando Smallwood PA-C   gabapentin 100 mg Oral TID Evan Ceron MD   insulin glargine 30 Units Subcutaneous QAM Shelley Lemus MD   insulin lispro 1-5 Units Subcutaneous TID [facility] Evan Ceron MD   insulin lispro 1-5 Units Subcutaneous HS Evan Ceron MD   insulin lispro 4 Units Subcutaneous TID With Meals Keesha MILLAN PA-C   latanoprost 1 drop Both Eyes HS Evan Ceron MD   levothyroxine 50 mcg Oral Early Morning Evan Ceron MD   loratadine 10 mg Oral Q48H Evan Ceron MD   [START ON 9/1/2019] losartan 25 mg Oral Once per day on Sun Mon Wed Fri Fernando Smallwood PA-C   melatonin 3 mg Oral HS Evan Ceron MD   nystatin  Topical BID Shelley Lemus MD   oxyCODONE 10 mg Oral Q6H PRN Evan Ceron MD   oxyCODONE 5 mg Oral Q6H PRN Evan Ceron MD   pantoprazole 40 mg Oral Daily Evan Ceron MD   prednisoLONE acetate 1 drop Both Eyes 4x Daily Evan Ceron MD   sertraline 50 mg Oral Daily Evan Ceron MD   timolol 1 drop Ophthalmic BID Evan Ceron MD   torsemide 100 mg Oral Q12H Lin Rodriges DO       PRN Meds:   acetaminophen    ALPRAZolam    doxercalciferol    oxyCODONE    oxyCODONE    Continuous Infusions:         LAB RESULTS:      Results from last 7 days   Lab Units 08/31/19  0446 08/30/19  0930 08/29/19  0548 08/28/19  0712 08/28/19  0605 08/27/19  0556 08/26/19  1154   WBC Thousand/uL 7 56 7 32 5 99  --  6 32 5 44 5 77   HEMOGLOBIN g/dL 8 0* 8 4* 8 0*  --  8 3* 7 7* 8 3*   HEMATOCRIT % 25 1* 26 2* 24 7*  --  26 1* 24 4* 25 3*   PLATELETS Thousands/uL 158 143* 137*  --  145* 130* 136*   NEUTROS PCT % 69 68  --   --  68 69 74   LYMPHS PCT % 18 17  --   --  19 18 15   MONOS PCT % 9 10  --   --  8 9 8   EOS PCT % 3 3  --   --  3 4 2   POTASSIUM mmol/L 5 0 4 7 5 3 4 8  --  6 3* 5 8*   CHLORIDE mmol/L 97* 98* 97* 95*  --  100 97*   CO2 mmol/L 26 29 27 29  --  26 27   BUN mg/dL 69* 47* 71* 44*  --  88* 58*   CREATININE mg/dL 8 17* 6 45* 8 53* 6 80*  --  10 20* 9 26*   CALCIUM mg/dL 8 0* 8 1* 7 6* 7 7*  --  7 8* 7 9*   ALK PHOS U/L  --   --  216* 223*  --   --   --    ALT U/L  --   --  17 20  --   --   --    AST U/L  --   --  16 16  --   --   --        CUTURES:  Lab Results   Component Value Date    BLOODCX No Growth After 4 Days  08/26/2019    BLOODCX No Growth After 4 Days  08/26/2019    BLOODCX No Growth After 5 Days  04/26/2019    BLOODCX No Growth After 5 Days  04/26/2019    BLOODCX No Growth After 5 Days  08/08/2017    BLOODCX No Growth After 5 Days  08/08/2017    BLOODCX No Growth After 5 Days  07/11/2016    BLOODCX No Growth After 5 Days  07/11/2016    URINECX >100,000 cfu/ml Lactobacillus species 07/12/2016    URINECX >100,000 cfu/ml Mixed Contaminants X3 07/12/2016                 Portions of the record may have been created with voice recognition software  Occasional wrong word or "sound a like" substitutions may have occurred due to the inherent limitations of voice recognition software  Read the chart carefully and recognize, using context, where substitutions have occurred  If you have any questions, please contact the dictating provider

## 2019-08-31 NOTE — HEMODIALYSIS
Patient received 3 out of 3 5 hours of HD today d t request to end tx early  Initial UF goal set for 3 5kg  Systolic BP in 90'A for much of treatment  BP dropped to 75/36 within first hour of tx, UF goal dcs'd at that time  Patient remained asymptomatic and without c o for all of treatment  Dr Davidson aware      Pre weight: 125 3kg  Total UF removed: 2509ml  Post weight: 123 3kg

## 2019-08-31 NOTE — PROGRESS NOTES
Progress Note - Podiatry  Kylee Tiwari 48 y o  female MRN: [id number]  Unit/Bed#: [facility] 510-01 Encounter: [id number]    Assessment:  1  Diabetic wounds of the left 5th digit and posterior heel  2  supratherapeutic INR has stabilized and is adequate for debridement  3  T2DM  4  ESRD    Plan:  · Left 5th digit xray and MRI negative for osteomyelitis  · Wounds appear stable and unchanged from yesterday  · She is stable for discharge from the perspective of podiatry  · She should continue to offload heels in prevalon boots to aid in healing  · She was prescribed heel offloading shoe to aide in ambulation and help with pain  · Left posterior heel wound dressed with acticoat, Maxorb, allevyn heel  · Left 5th digit dressed with betadine DSD  · Will confirm this plan with my attending    Subjective/Objective   Chief Complaint:   Chief Complaint   Patient presents with    Leg Pain     Patient presents to the E R  with leg and foot pain  Subjective: 48 y o  y/o female was seen and evaluated at bedside  No acute events overnight  Blood pressure (!) 82/41, pulse 70, temperature 98 5 °F (36 9 °C), temperature source Oral, resp  rate 18, height 5' 5 98" (1 676 m), weight 123 kg (271 lb 9 7 oz), last menstrual period 02/12/2016, SpO2 92 %, not currently breastfeeding  ,Body mass index is 45 2 kg/m²  Invasive Devices     Peripheral Intravenous Line            Peripheral IV 08/26/19 Dorsal (posterior); Proximal;Right Forearm 4 days          Line            Hemodialysis AV Fistula 02/20/18 Left Forearm 556 days                Physical Exam:   General: Alert, cooperative and no distress  Lungs: Non labored breathing  Abdomen: Soft, non-tender  Extremity:     NVS at baseline B/l  MSK function at baseline B/l  No calf tenderness noted B/l  Lab, Imaging and other studies:   I have personally reviewed pertinent lab results    , CBC:   Lab Results   Component Value Date    WBC 7 56 08/31/2019    HGB 8 0 (L) 08/31/2019 HCT 25 1 (L) 08/31/2019    MCV 96 08/31/2019     08/31/2019    MCH 30 5 08/31/2019    MCHC 31 9 08/31/2019    RDW 14 2 08/31/2019    MPV 11 1 08/31/2019    NRBC 0 08/31/2019   , CMP:   Lab Results   Component Value Date    SODIUM 133 (L) 08/31/2019    K 5 0 08/31/2019    CL 97 (L) 08/31/2019    CO2 26 08/31/2019    BUN 69 (H) 08/31/2019    CREATININE 8 17 (H) 08/31/2019    CALCIUM 8 0 (L) 08/31/2019    EGFR 5 08/31/2019       Imaging: I have personally reviewed pertinent films in PACS  EKG, Pathology, and Other Studies: I have personally reviewed pertinent reports  Portions of the record may have been created with voice recognition software  Occasional wrong word or "sound a like" substitutions may have occurred due to the inherent limitations of voice recognition software  Read the chart carefully and recognize, using context, where substitutions have occurred

## 2019-08-31 NOTE — NURSING NOTE
Pt discharged to home with all of belongings and IV's d/c'd  Medication and discharge instructions given and understood by pt  Prescriptions given to pt at bedside   Pt wheeled down to lobby in wheelchair by PCA

## 2019-09-02 NOTE — DISCHARGE SUMMARY
Discharge Summary - [address] [facility]    Patient Information: Phoebe Murrieta 48 y o  female MRN: [id number]  Unit/Bed#: [facility] 510-01 Encounter: [id number]    Discharging Physician / Practitioner: Rosanna Matos MD  PCP: Lincoln Rodríguez MD  Admission Date: 8/26/2019  Discharge Date:  8/31/2019  Disposition:     Home    Reason for Admission:  Diabetic foot ulcer    Discharge Diagnoses:     Principal Problem:    Diabetic foot ulcer ([place] [address] )  Active Problems:    History of DVT (deep vein thrombosis)    ESRD on hemodialysis ([facility] )    Type 2 diabetes mellitus ([facility] )    Anemia in end-stage renal disease ([facility] )    Secondary hyperparathyroidism of renal origin ([place] [address] )    Hyperkalemia    Hyponatremia    Parenchymal renal hypertension    Candidiasis of breast    Hemodialysis-associated hypotension  Resolved Problems:    Supratherapeutic INR      Consultations During Hospital Stay:  · Podiatry  · Infectious Disease    Procedures Performed:     · Wound debridement    Significant Findings / Test Results:     ·     Incidental Findings:       Test Results Pending at Discharge (will require follow up):   ·      Outpatient Tests Requested:  · Protime on Tuesday    Complications:   none    Hospital Course:     Phoebe Murrieta is a 48 y o  female patient who originally presented to the hospital on 8/26/2019 due to left heel pain and  Ulcer  Patient with known history of diabetes and she is on insulin  Patient also with history of end-stage renal disease on dialysis  Patient follows up with the podiatrist as an outpatient and known to have left heel ulceration and noticed that the wound was more open and there was purulent drainage coming out of it  Patient was brought to the hospital   Podiatry evaluated the patient patient was initially started on antibiotics with ceftriaxone and also vancomycin which was eventually transition to vancomycin  Her wound culture came back positive for MRSA    Podiatry evaluated the patient and had wound debridement done on the heel ulcer and also on the left 5th digit  Patient also had MRI done which was negative for any osteomyelitis  I had a discussion with Podiatry at this time patient do not need any further antibiotics  Patient also had coagulopathy secondary to Coumadin  Her Coumadin was on hold and restarted back at the time of discharge since the INR trended down  Recommended to recheck the INR on Monday in follow-up with the PCP for further management of Coumadin  Patient also developed acute candidiasis of the breast while in the hospital for which she is getting nystatin with improvement  For details refer to the chart  This patient was discharged in a stable condition on 8/31/19    Condition at Discharge: good     Discharge Day Visit / Exam:     Subjective:  Patient seen and examined  Feeling better  Patient wants to go home today  Vitals: Blood Pressure: 117/55 (08/31/19 1548)  Pulse: 78 (08/31/19 1548)  Temperature: 97 8 °F (36 6 °C) (08/31/19 1231)  Temp Source: Oral (08/31/19 1231)  Respirations: 18 (08/31/19 1548)  Height: 5' 5 98" (167 6 cm) (08/26/19 1504)  Weight - Scale: 123 kg (271 lb 9 7 oz) (08/30/19 0601)  SpO2: 96 % (08/31/19 1548)  Exam:   Physical Exam   Constitutional: She appears well-developed  No distress  HENT:   Head: Normocephalic and atraumatic  Eyes: Right eye exhibits no discharge  Neck: Normal range of motion  No JVD present  Cardiovascular: Normal rate and regular rhythm  No murmur heard  Pulmonary/Chest: Effort normal and breath sounds normal  No respiratory distress  Abdominal: Soft  She exhibits no mass  Musculoskeletal: Normal range of motion  Left lower extremity heel and toe ulceration covered in dressing   Neurological: She is alert     Skin:   Cutaneous candidiasis is improving       Discussion with Family:  Updated patient    Discharge instructions/Information to patient and family:   See after visit summary for information provided to patient and family  Provisions for Follow-Up Care:  See after visit summary for information related to follow-up care and any pertinent home health orders  Planned Readmission:  none     Discharge Statement:  I spent 45  minutes discharging the patient  This time was spent on the day of discharge  I had direct contact with the patient on the day of discharge  Greater than 50% of the total time was spent examining patient, answering all patient questions, arranging and discussing plan of care with patient as well as directly providing post-discharge instructions  Additional time then spent on discharge activities  Discharge Medications:  See after visit summary for reconciled discharge medications provided to patient and family        ** Please Note: This note has been constructed using a voice recognition system **

## 2019-09-04 ENCOUNTER — TRANSCRIBE ORDERS (OUTPATIENT)
Dept: INTERNAL MEDICINE CLINIC | Facility: CLINIC | Age: 54
End: 2019-09-04

## 2019-09-04 DIAGNOSIS — L97.509 DIABETIC FOOT ULCER ASSOCIATED WITH DIABETES MELLITUS DUE TO UNDERLYING CONDITION, UNSPECIFIED LATERALITY, UNSPECIFIED PART OF FOOT, UNSPECIFIED ULCER STAGE (HCC): Primary | ICD-10-CM

## 2019-09-04 DIAGNOSIS — E08.621 DIABETIC FOOT ULCER ASSOCIATED WITH DIABETES MELLITUS DUE TO UNDERLYING CONDITION, UNSPECIFIED LATERALITY, UNSPECIFIED PART OF FOOT, UNSPECIFIED ULCER STAGE (HCC): Primary | ICD-10-CM

## 2019-09-17 ENCOUNTER — OFFICE VISIT (OUTPATIENT)
Dept: MULTI SPECIALTY CLINIC | Facility: CLINIC | Age: 54
End: 2019-09-17

## 2019-09-17 VITALS
BODY MASS INDEX: 46.22 KG/M2 | TEMPERATURE: 97.6 F | DIASTOLIC BLOOD PRESSURE: 82 MMHG | WEIGHT: 277.78 LBS | HEART RATE: 72 BPM | SYSTOLIC BLOOD PRESSURE: 140 MMHG

## 2019-09-17 DIAGNOSIS — E08.621 DIABETIC FOOT ULCER ASSOCIATED WITH DIABETES MELLITUS DUE TO UNDERLYING CONDITION, UNSPECIFIED LATERALITY, UNSPECIFIED PART OF FOOT, UNSPECIFIED ULCER STAGE (HCC): ICD-10-CM

## 2019-09-17 DIAGNOSIS — L97.509 DIABETIC FOOT ULCER ASSOCIATED WITH DIABETES MELLITUS DUE TO UNDERLYING CONDITION, UNSPECIFIED LATERALITY, UNSPECIFIED PART OF FOOT, UNSPECIFIED ULCER STAGE (HCC): ICD-10-CM

## 2019-09-17 DIAGNOSIS — I73.9 PERIPHERAL ARTERIAL DISEASE (HCC): Primary | ICD-10-CM

## 2019-09-17 PROCEDURE — 99203 OFFICE O/P NEW LOW 30 MIN: CPT | Performed by: PODIATRIST

## 2019-09-17 RX ORDER — DOXYCYCLINE 100 MG/1
100 TABLET ORAL 2 TIMES DAILY
Qty: 14 TABLET | Refills: 0 | Status: SHIPPED | OUTPATIENT
Start: 2019-09-17 | End: 2019-09-24

## 2019-09-17 RX ORDER — TRAMADOL HYDROCHLORIDE 50 MG/1
50 TABLET ORAL EVERY 8 HOURS PRN
Qty: 28 TABLET | Refills: 0 | Status: ON HOLD | OUTPATIENT
Start: 2019-09-17 | End: 2020-02-21 | Stop reason: SDUPTHER

## 2019-09-17 NOTE — PROGRESS NOTES
Assessment/Plan:     Diagnoses and all orders for this visit:    Peripheral arterial disease ([place] [address] )  -     VAS lower limb arterial duplex, complete bilateral; Future    Diabetic foot ulcer associated with diabetes mellitus due to underlying condition, unspecified laterality, unspecified part of foot, unspecified ulcer stage ([place] [address] )  -     Ambulatory referral to Podiatry  -     Ambulatory referral to Wound Care; Future  -     doxycycline (ADOXA) 100 MG tablet; Take 1 tablet (100 mg total) by mouth 2 (two) times a day for 7 days  -     traMADol (ULTRAM) 50 mg tablet; Take 1 tablet (50 mg total) by mouth every 8 (eight) hours as needed for moderate pain    - patient is to get arterial dopplers prior to wound care appointment, patient is in understanding of treatment plan and is in agreement, all questions and concerns were addressed  - patient to follow-up at [facility] wound care center  - continue to weight bear as tolerated in heel offloading shoe        Subjective:      Patient ID: Da Abreu is a 48 y o  female  Patient is a 47yo female who presents as follow-up after recent hospitalization at [place] [address]  She was seen by podiatry during her hospital stay who provided local wound care to her left heel consisting of betadine paint  She states she currently has visiting nurse that comes twice a week for local wound care  She states she is in a lot pain to her left foot and would like pain medications to relieve her pain  The following portions of the patient's history were reviewed and updated as appropriate: allergies, current medications, past family history, past medical history, past social history, past surgical history and problem list     Review of Systems   Constitutional: Negative  Respiratory: Negative  Cardiovascular: Negative  Skin: Positive for wound           Objective:      /82   Pulse 72   Temp 97 6 °F (36 4 °C)   Wt 126 kg (277 lb 12 5 oz)   LMP 02/12/2016 (Exact Date)   BMI 46 22 kg/m²          Physical Exam   Constitutional: She appears well-developed and well-nourished  Cardiovascular:   Pedal pulses nonpalpable, dopplerable   Musculoskeletal: Normal range of motion  She exhibits tenderness  She exhibits no edema  Hx of right 4th toe amputation   Skin: Skin is dry  No erythema     Left heel wound with dry eschar, left 5th toe wound with dry eschar

## 2019-09-19 ENCOUNTER — APPOINTMENT (OUTPATIENT)
Dept: LAB | Facility: CLINIC | Age: 54
End: 2019-09-19
Payer: MEDICARE

## 2019-09-24 ENCOUNTER — APPOINTMENT (EMERGENCY)
Dept: RADIOLOGY | Facility: HOSPITAL | Age: 54
End: 2019-09-24
Payer: MEDICARE

## 2019-09-24 ENCOUNTER — HOSPITAL ENCOUNTER (EMERGENCY)
Facility: HOSPITAL | Age: 54
Discharge: HOME | End: 2019-09-24
Attending: EMERGENCY MEDICINE
Payer: MEDICARE

## 2019-09-24 VITALS
SYSTOLIC BLOOD PRESSURE: 155 MMHG | OXYGEN SATURATION: 98 % | RESPIRATION RATE: 18 BRPM | TEMPERATURE: 97.5 F | BODY MASS INDEX: 46.1 KG/M2 | HEART RATE: 72 BPM | DIASTOLIC BLOOD PRESSURE: 60 MMHG | WEIGHT: 277 LBS

## 2019-09-24 DIAGNOSIS — R11.2 NAUSEA & VOMITING: ICD-10-CM

## 2019-09-24 DIAGNOSIS — R42 LIGHTHEADEDNESS: Primary | ICD-10-CM

## 2019-09-24 LAB
ALBUMIN SERPL BCP-MCNC: 3 G/DL (ref 3.5–5)
ALP SERPL-CCNC: 135 U/L (ref 46–116)
ALT SERPL W P-5'-P-CCNC: 20 U/L (ref 12–78)
ANION GAP SERPL CALCULATED.3IONS-SCNC: 11 MMOL/L (ref 4–13)
APTT PPP: 36 SECONDS (ref 23–37)
AST SERPL W P-5'-P-CCNC: 14 U/L (ref 5–45)
ATRIAL RATE: 77 BPM
ATRIAL RATE: 78 BPM
BASOPHILS # BLD AUTO: 0.05 THOUSANDS/ΜL (ref 0–0.1)
BASOPHILS NFR BLD AUTO: 1 % (ref 0–1)
BILIRUB SERPL-MCNC: 0.35 MG/DL (ref 0.2–1)
BUN SERPL-MCNC: 68 MG/DL (ref 5–25)
CALCIUM SERPL-MCNC: 8 MG/DL (ref 8.3–10.1)
CHLORIDE SERPL-SCNC: 95 MMOL/L (ref 100–108)
CO2 SERPL-SCNC: 25 MMOL/L (ref 21–32)
CREAT SERPL-MCNC: 10.1 MG/DL (ref 0.6–1.3)
EOSINOPHIL # BLD AUTO: 0.19 THOUSAND/ΜL (ref 0–0.61)
EOSINOPHIL NFR BLD AUTO: 2 % (ref 0–6)
ERYTHROCYTE [DISTWIDTH] IN BLOOD BY AUTOMATED COUNT: 14.1 % (ref 11.6–15.1)
GFR SERPL CREATININE-BSD FRML MDRD: 4 ML/MIN/1.73SQ M
GLUCOSE SERPL-MCNC: 196 MG/DL (ref 65–140)
HCT VFR BLD AUTO: 27.3 % (ref 34.8–46.1)
HGB BLD-MCNC: 8.9 G/DL (ref 11.5–15.4)
IMM GRANULOCYTES # BLD AUTO: 0.03 THOUSAND/UL (ref 0–0.2)
IMM GRANULOCYTES NFR BLD AUTO: 0 % (ref 0–2)
INR PPP: 2.24 (ref 0.84–1.19)
LIPASE SERPL-CCNC: 63 U/L (ref 73–393)
LYMPHOCYTES # BLD AUTO: 1.04 THOUSANDS/ΜL (ref 0.6–4.47)
LYMPHOCYTES NFR BLD AUTO: 13 % (ref 14–44)
MAGNESIUM SERPL-MCNC: 2.5 MG/DL (ref 1.6–2.6)
MCH RBC QN AUTO: 31.6 PG (ref 26.8–34.3)
MCHC RBC AUTO-ENTMCNC: 32.6 G/DL (ref 31.4–37.4)
MCV RBC AUTO: 97 FL (ref 82–98)
MONOCYTES # BLD AUTO: 0.55 THOUSAND/ΜL (ref 0.17–1.22)
MONOCYTES NFR BLD AUTO: 7 % (ref 4–12)
NEUTROPHILS # BLD AUTO: 6.06 THOUSANDS/ΜL (ref 1.85–7.62)
NEUTS SEG NFR BLD AUTO: 77 % (ref 43–75)
NRBC BLD AUTO-RTO: 0 /100 WBCS
P AXIS: 26 DEGREES
P AXIS: 27 DEGREES
PLATELET # BLD AUTO: 214 THOUSANDS/UL (ref 149–390)
PMV BLD AUTO: 10.8 FL (ref 8.9–12.7)
POTASSIUM SERPL-SCNC: 5 MMOL/L (ref 3.5–5.3)
PR INTERVAL: 176 MS
PR INTERVAL: 176 MS
PROT SERPL-MCNC: 6.8 G/DL (ref 6.4–8.2)
PROTHROMBIN TIME: 24.3 SECONDS (ref 11.6–14.5)
QRS AXIS: 47 DEGREES
QRS AXIS: 60 DEGREES
QRSD INTERVAL: 92 MS
QRSD INTERVAL: 96 MS
QT INTERVAL: 412 MS
QT INTERVAL: 438 MS
QTC INTERVAL: 469 MS
QTC INTERVAL: 495 MS
RBC # BLD AUTO: 2.82 MILLION/UL (ref 3.81–5.12)
SODIUM SERPL-SCNC: 131 MMOL/L (ref 136–145)
T WAVE AXIS: 81 DEGREES
T WAVE AXIS: 84 DEGREES
TROPONIN I SERPL-MCNC: <0.02 NG/ML
VENTRICULAR RATE: 77 BPM
VENTRICULAR RATE: 78 BPM
WBC # BLD AUTO: 7.92 THOUSAND/UL (ref 4.31–10.16)

## 2019-09-24 PROCEDURE — 36415 COLL VENOUS BLD VENIPUNCTURE: CPT | Performed by: EMERGENCY MEDICINE

## 2019-09-24 PROCEDURE — 83690 ASSAY OF LIPASE: CPT | Performed by: EMERGENCY MEDICINE

## 2019-09-24 PROCEDURE — 85730 THROMBOPLASTIN TIME PARTIAL: CPT | Performed by: EMERGENCY MEDICINE

## 2019-09-24 PROCEDURE — 73130 X-RAY EXAM OF HAND: CPT

## 2019-09-24 PROCEDURE — 83735 ASSAY OF MAGNESIUM: CPT | Performed by: EMERGENCY MEDICINE

## 2019-09-24 PROCEDURE — 85610 PROTHROMBIN TIME: CPT | Performed by: EMERGENCY MEDICINE

## 2019-09-24 PROCEDURE — 99285 EMERGENCY DEPT VISIT HI MDM: CPT | Performed by: EMERGENCY MEDICINE

## 2019-09-24 PROCEDURE — 93005 ELECTROCARDIOGRAM TRACING: CPT

## 2019-09-24 PROCEDURE — 85025 COMPLETE CBC W/AUTO DIFF WBC: CPT | Performed by: EMERGENCY MEDICINE

## 2019-09-24 PROCEDURE — 93010 ELECTROCARDIOGRAM REPORT: CPT | Performed by: INTERNAL MEDICINE

## 2019-09-24 PROCEDURE — 73110 X-RAY EXAM OF WRIST: CPT

## 2019-09-24 PROCEDURE — 84484 ASSAY OF TROPONIN QUANT: CPT | Performed by: EMERGENCY MEDICINE

## 2019-09-24 PROCEDURE — 99285 EMERGENCY DEPT VISIT HI MDM: CPT

## 2019-09-24 PROCEDURE — 80053 COMPREHEN METABOLIC PANEL: CPT | Performed by: EMERGENCY MEDICINE

## 2019-09-24 PROCEDURE — 71045 X-RAY EXAM CHEST 1 VIEW: CPT

## 2019-09-24 RX ORDER — ACETAMINOPHEN 325 MG/1
650 TABLET ORAL ONCE
Status: DISCONTINUED | OUTPATIENT
Start: 2019-09-24 | End: 2019-09-24 | Stop reason: HOSPADM

## 2019-09-24 RX ORDER — ONDANSETRON 2 MG/ML
4 INJECTION INTRAMUSCULAR; INTRAVENOUS ONCE
Status: DISCONTINUED | OUTPATIENT
Start: 2019-09-24 | End: 2019-09-24

## 2019-09-24 NOTE — ED ATTENDING ATTESTATION
9/24/2019  IAnjali DO, saw and evaluated the patient  I have discussed the patient with the resident/non-physician practitioner and agree with the resident's/non-physician practitioner's findings, Plan of Care, and MDM as documented in the resident's/non-physician practitioner's note, except where noted  All available labs and Radiology studies were reviewed  I was present for key portions of any procedure(s) performed by the resident/non-physician practitioner and I was immediately available to provide assistance  At this point I agree with the current assessment done in the Emergency Department  I have conducted an independent evaluation of this patient a history and physical is as follows:       51-year-old female with end-stage renal disease presents for dizziness  Patient was at her dialysis appointment today and felt nausea and dizziness  No headache no other associated symptoms other than various musculoskeletal complaints    Plan is to check labs for metabolic derangement x-ray left hand, likely discharge      ED Course         Critical Care Time  Procedures

## 2019-09-24 NOTE — ED PROVIDER NOTES
History  Chief Complaint   Patient presents with    Dizziness     Pt was at the dialysis office when she started to feel dizziness as well as having nausea/vomiting  The nurses at dialysis called the ambulance for the pt  This is a 37-year-old female that presents with chief complaint of dizziness  Patient states that her symptoms began yesterday, gradual onset, no specific trigger  Patient states that by dizziness she means she feels lightheaded and that she might pass out although patient denies having actually lost consciousness at any time  She does state that she fell yesterday due to be feeling lightheaded, she fell on to her left wrist and hand is complaining of some pain in the left wrist and hand but does have full range of motion  She also is complaining of multiple other symptoms including vague chest discomfort which she states has been a recurrent issue although patient denies any known history of heart problems  She also reports that she has had several episodes of vomiting past 24 episodes with nonbloody nonbilious emesis  She does report some mild diffuse abdominal burning abdominal pain started after vomiting  She denies any diarrhea  She states that she had subjective chills had a temperature of 97° when she checked at home  She denies this any back pain or neck pain  Patient receives Tuesday Thursday Saturday dialysis, she was scheduled for dialysis this morning but was complaining of her symptoms and so they referred her to the emergency department without initiating dialysis  Prior to Admission Medications   Prescriptions Last Dose Informant Patient Reported? Taking?    ALPRAZolam (XANAX) 0 25 mg tablet 9/23/2019 at Unknown time Self Yes Yes   Sig: Take 0 25 mg by mouth 2 (two) times a day as needed for anxiety   B Complex-C-Folic Acid (TRIPHROCAPS) 1 MG CAPS 9/23/2019 at Unknown time Self No Yes   Sig: Take 1 capsule (1 mg total) by mouth daily   CALCIUM CARBONATE PO 9/23/2019 at Unknown time Self Yes Yes   Sig: Take 1,000 mg by mouth daily     Ferric Citrate (AURYXIA) 1  MG(Fe) TABS 9/23/2019 at Unknown time Self Yes Yes   Sig: Take by mouth   KIONEX 15 GM/60ML suspension Past Week at Unknown time Self Yes Yes   Sig: Take by mouth Take as directed   LIDOCAINE EX 9/24/2019 at Unknown time Self Yes Yes   Sig: Apply topically Apply to access 1 hour prior to HD   Melatonin 3 MG CAPS 9/23/2019 at Unknown time Self Yes Yes   Sig: Take 10 mg by mouth daily at bedtime     TIMOLOL MALEATE OP 9/23/2019 at Unknown time Self Yes Yes   Sig: Apply 1 drop to eye 2 (two) times a day   atorvastatin (LIPITOR) 20 mg tablet 9/23/2019 at Unknown time Self Yes Yes   Sig: Take 20 mg by mouth every evening    brimonidine (ALPHAGAN P) 0 15 % ophthalmic solution 9/23/2019 at Unknown time Self Yes Yes   Sig: Administer 1 drop to both eyes 2 (two) times a day    butalbital-acetaminophen-caffeine (FIORICET,ESGIC) -40 mg per tablet Not Taking at Unknown time  No No   Sig: Take 1 tablet by mouth daily as needed for headaches   Patient not taking: Reported on 8/26/2019   carvedilol (COREG) 25 mg tablet Past Week at Unknown time Self Yes Yes   Sig: Take 25 mg by mouth 2 (two) times a day with meals     cholecalciferol (VITAMIN D3) 1,000 units tablet 9/23/2019 at Unknown time Self Yes Yes   Sig: Take 2,000 Units by mouth daily   cinacalcet (SENSIPAR) 30 mg tablet 9/23/2019 at Unknown time Self Yes Yes   Sig: Take 30 mg by mouth daily   cyclobenzaprine (FLEXERIL) 10 mg tablet 9/23/2019 at Unknown time Self Yes Yes   Sig: Take 10 mg by mouth daily at bedtime   diclofenac sodium (VOLTAREN) 1 % 9/23/2019 at Unknown time  No Yes   Sig: Apply 2 g topically 4 (four) times a day   diphenhydrAMINE (BENADRYL) 25 mg capsule 9/23/2019 at Unknown time Self Yes Yes   Sig: Take by mouth as needed for itching     dorzolamide-timolol (COSOPT) 22 3-6 8 MG/ML ophthalmic solution 9/23/2019 at Unknown time Self Yes Yes Sig: instill 1 drop into both eyes twice a day   doxycycline (ADOXA) 100 MG tablet 9/23/2019 at Unknown time  No Yes   Sig: Take 1 tablet (100 mg total) by mouth 2 (two) times a day for 7 days   gabapentin (NEURONTIN) 100 mg capsule 9/23/2019 at Unknown time Self Yes Yes   Sig: Take 100 mg by mouth 3 (three) times a day     insulin glargine (LANTUS) 100 units/mL subcutaneous injection 9/23/2019 at Unknown time  No Yes   Sig: Inject 30 Units under the skin daily at bedtime   latanoprost (XALATAN) 0 005 % ophthalmic solution 9/23/2019 at Unknown time Self Yes Yes   Sig: Administer 1 drop to both eyes daily at bedtime   levothyroxine 50 mcg tablet 9/23/2019 at Unknown time Self Yes Yes   Sig: Take 50 mcg by mouth daily   loratadine (CLARITIN) 10 mg tablet 9/23/2019 at Unknown time  Yes Yes   Sig: Take 10 mg by mouth daily   losartan (COZAAR) 25 mg tablet Past Week at Unknown time Self No Yes   Sig: Take 1 tablet (25 mg total) by mouth daily Take on NON-Dialysis Days   methazolamide (NEPTAZANE) 25 MG tablet 9/23/2019 at Unknown time Self Yes Yes   Sig: Take 25 mg by mouth 3 (three) times a day     nystatin (MYCOSTATIN) powder 9/23/2019 at Unknown time  No Yes   Sig: Apply topically 2 (two) times a day   ondansetron (ZOFRAN) 4 mg tablet Past Week at Unknown time Self No Yes   Sig: Take 1 tablet (4 mg total) by mouth every 8 (eight) hours as needed for nausea or vomiting   pantoprazole (PROTONIX) 40 mg tablet 9/23/2019 at Unknown time  Yes Yes   Sig: Take 40 mg by mouth daily   prednisoLONE acetate (PRED FORTE) 1 % ophthalmic suspension 9/23/2019 at Unknown time Self Yes Yes   Sig: Administer 1 drop to both eyes 4 (four) times a day     sertraline (ZOLOFT) 50 mg tablet 9/23/2019 at Unknown time Self No Yes   Sig: Take 1 tablet (50 mg total) by mouth daily   torsemide (DEMADEX) 100 mg tablet 9/23/2019 at Unknown time Self Yes Yes   Sig: Take 200 mg by mouth every 12 (twelve) hours   traMADol (ULTRAM) 50 mg tablet   No Yes   Sig: Take 1 tablet (50 mg total) by mouth every 8 (eight) hours as needed for moderate pain   warfarin (COUMADIN) 2 5 mg tablet Past Week at Unknown time Self Yes Yes   Sig: Take 12 5 mg by mouth daily Monday through Friday   warfarin (COUMADIN) 7 5 mg tablet Past Week at Unknown time Self Yes Yes   Sig: Take 15 mg by mouth daily Saturday and       Facility-Administered Medications: None       Past Medical History:   Diagnosis Date    Abnormal uterine bleeding (AUB)     Anxiety     Diabetes mellitus ([facility]     Disease of thyroid gland     DVT (deep venous thrombosis) (HCC)     End stage kidney disease (HCC)     Foot ulcer due to secondary DM ([facility]     Hypertension     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[facility] )     right eye    Morbid obesity ([facility]     Panic attacks     Reflux esophagitis     Thrombophlebitis of saphenous vein     Warfarin anticoagulation        Past Surgical History:   Procedure Laterality Date    ARTERIOVENOUS GRAFT PLACEMENT      CERVICAL BIOPSY  W/ LOOP ELECTRODE EXCISION       SECTION      DIALYSIS FISTULA CREATION      DILATION AND CURETTAGE OF UTERUS      ENDOMETRIAL ABLATION W/ NOVASURE      EYE SURGERY      HYSTERECTOMY      @ age 57 (complete)    OOPHORECTOMY Bilateral     @ age 57   [address] FLX DX W/LANDON Castillo  PFRMD N/A 3/13/2019    Procedure: COLONOSCOPY;  Surgeon: Jose Eduardo Hassan MD;  Location: [facility]; Service: Gastroenterology    AR ESOPHAGOGASTRODUODENOSCOPY TRANSORAL DIAGNOSTIC N/A 3/13/2019    Procedure: ESOPHAGOGASTRODUODENOSCOPY (EGD); Surgeon: Jose Eduardo Hassan MD;  Location: [facility];   Service: Gastroenterology    AR LAPAROSCOPY W TOT HYSTERECT UTERUS 250 GRAM OR LESS N/A 2016    Procedure: HYSTERECTOMY LAPAROSCOPIC TOTAL [facility]), with bilateral salpingectomy;  Surgeon: Pepito Watkins DO;  [facility];  Service: Gynecology    THROMBECTOMY / ARTERIOVENOUS GRAFT REVISION      TOE SURGERY Right     removal of the 4th toe       Family History   Problem Relation Age of Onset    Heart disease Family     Diabetes Family     Heart disease Mother     Cancer Brother         neck    Throat cancer Brother     Ovarian cancer Maternal Aunt 36     I have reviewed and agree with the history as documented  Social History     Tobacco Use    Smoking status: Never Smoker    Smokeless tobacco: Never Used   Substance Use Topics    Alcohol use: Not Currently     Alcohol/week: 0 0 standard drinks     Frequency: Never     Drinks per session: Patient refused     Binge frequency: Patient refused    Drug use: No        Review of Systems   Constitutional: Positive for chills  Negative for fever  HENT: Negative for rhinorrhea and sore throat  Eyes: Negative for photophobia and visual disturbance  Respiratory: Negative for cough and shortness of breath  Cardiovascular: Positive for chest pain  Negative for palpitations  Gastrointestinal: Positive for abdominal pain, nausea and vomiting  Negative for blood in stool and diarrhea  Genitourinary: Negative for dysuria and hematuria  Musculoskeletal: Negative for neck pain and neck stiffness  Skin: Negative for rash and wound  Neurological: Negative for weakness and numbness  Psychiatric/Behavioral: Negative for agitation and confusion  All other systems reviewed and are negative        Physical Exam  ED Triage Vitals   Temperature Pulse Respirations Blood Pressure SpO2   09/24/19 0615 09/24/19 0615 09/24/19 0615 09/24/19 0619 09/24/19 0615   97 5 °F (36 4 °C) 79 20 136/58 98 %      Temp Source Heart Rate Source Patient Position - Orthostatic VS BP Location FiO2 (%)   09/24/19 0615 09/24/19 0615 09/24/19 0615 09/24/19 0615 --   Oral Monitor Lying Right arm       Pain Score       09/24/19 0615       8             Orthostatic Vital Signs  Vitals:    09/24/19 0615 09/24/19 0619 09/24/19 0724 09/24/19 0843   BP:  136/58 160/65 155/60   Pulse: 79  76 72   Patient Position - Orthostatic VS: Lying  Lying Lying       Physical Exam   Constitutional: She is oriented to person, place, and time  She appears well-developed  No distress  HENT:   Head: Normocephalic and atraumatic  Right Ear: External ear normal    Left Ear: External ear normal    Nose: Nose normal    Eyes: Conjunctivae and EOM are normal    Cardiovascular: Normal rate, normal heart sounds and intact distal pulses  Pulmonary/Chest: Effort normal and breath sounds normal  No stridor  No respiratory distress  She has no wheezes  She has no rales  She exhibits no tenderness  Abdominal: Soft  She exhibits no distension  There is no tenderness  There is no rigidity, no rebound, no guarding and no CVA tenderness  Musculoskeletal: She exhibits no tenderness  Left upper extremity:  Mild tenderness to palpation throughout left wrist and hand, full range of motion, no crepitus or deformity or effusion, 2+ radial pulse, intact strength and distal sensation   Neurological: She is alert and oriented to person, place, and time  No cranial nerve deficit or sensory deficit  She exhibits normal muscle tone  Skin: Skin is warm and dry  Capillary refill takes less than 2 seconds  She is not diaphoretic  Psychiatric: Her behavior is normal    Nursing note and vitals reviewed        ED Medications  Medications - No data to display    Diagnostic Studies  Results Reviewed     Procedure Component Value Units Date/Time    Magnesium [[id number]]  (Normal) Collected:  09/24/19 0647    Lab Status:  Final result Specimen:  Blood from Arm, Right Updated:  09/24/19 0808     Magnesium 2 5 mg/dL     Protime-INR [[id number]]  (Abnormal) Collected:  09/24/19 0724    Lab Status:  Final result Specimen:  Blood from Arm, Right Updated:  09/24/19 0754     Protime 24 3 seconds      INR 2 24    APTT [[id number]]  (Normal) Collected:  09/24/19 0724    Lab Status:  Final result Specimen:  Blood from Arm, Right Updated:  09/24/19 0754     PTT 36 seconds     Troponin I [[id number]]  (Normal) Collected:  09/24/19 0647    Lab Status:  Final result Specimen:  Blood from Arm, Right Updated:  09/24/19 0722     Troponin I <0 02 ng/mL     Comprehensive metabolic panel [[id number]]  (Abnormal) Collected:  09/24/19 0647    Lab Status:  Final result Specimen:  Blood from Arm, Right Updated:  09/24/19 0719     Sodium 131 mmol/L      Potassium 5 0 mmol/L      Chloride 95 mmol/L      CO2 25 mmol/L      ANION GAP 11 mmol/L      BUN 68 mg/dL      Creatinine 10 10 mg/dL      Glucose 196 mg/dL      Calcium 8 0 mg/dL      AST 14 U/L      ALT 20 U/L      Alkaline Phosphatase 135 U/L      Total Protein 6 8 g/dL      Albumin 3 0 g/dL      Total Bilirubin 0 35 mg/dL      eGFR 4 ml/min/1 73sq m     Narrative:       National Kidney Disease Foundation guidelines for Chronic Kidney Disease (CKD):     Stage 1 with normal or high GFR (GFR > 90 mL/min/1 73 square meters)    Stage 2 Mild CKD (GFR = 60-89 mL/min/1 73 square meters)    Stage 3A Moderate CKD (GFR = 45-59 mL/min/1 73 square meters)    Stage 3B Moderate CKD (GFR = 30-44 mL/min/1 73 square meters)    Stage 4 Severe CKD (GFR = 15-29 mL/min/1 73 square meters)    Stage 5 End Stage CKD (GFR <15 mL/min/1 73 square meters)  Note: GFR calculation is accurate only with a steady state creatinine    Lipase [[id number]]  (Abnormal) Collected:  09/24/19 0647    Lab Status:  Final result Specimen:  Blood from Arm, Right Updated:  09/24/19 0719     Lipase 63 u/L     CBC and differential [[id number]]  (Abnormal) Collected:  09/24/19 0647    Lab Status:  Final result Specimen:  Blood from Arm, Right Updated:  09/24/19 0703     WBC 7 92 Thousand/uL      RBC 2 82 Million/uL      Hemoglobin 8 9 g/dL      Hematocrit 27 3 %      MCV 97 fL      MCH 31 6 pg      MCHC 32 6 g/dL      RDW 14 1 %      MPV 10 8 fL      Platelets 130 Thousands/uL      nRBC 0 /100 WBCs      Neutrophils Relative 77 %      Immat GRANS % 0 % Lymphocytes Relative 13 %      Monocytes Relative 7 %      Eosinophils Relative 2 %      Basophils Relative 1 %      Neutrophils Absolute 6 06 Thousands/µL      Immature Grans Absolute 0 03 Thousand/uL      Lymphocytes Absolute 1 04 Thousands/µL      Monocytes Absolute 0 55 Thousand/µL      Eosinophils Absolute 0 19 Thousand/µL      Basophils Absolute 0 05 Thousands/µL                  XR chest portable   ED Interpretation by Angi Staton MD (09/24 0720)   No acute abnormality      Final Result by Tiny Douglass MD (09/24 0914)      Cardiomegaly and mild pulmonary edema  Workstation performed: [id number]         XR hand 3+ views LEFT   Final Result by Tiny Douglass MD (09/24 0912)      No acute osseous abnormality  Workstation performed: [id number]         XR wrist 3+ views LEFT   ED Interpretation by Angi Staton MD (09/24 1699)   No acute abnormality      Final Result by Tiny Douglass MD (09/24 0913)      No acute osseous abnormality  Workstation performed: [id number]               Procedures  Procedures        ED Course  ED Course as of Sep 24 2051   Tue Sep 24, 2019   2535 Procedure Note: EKG  Date/Time: 09/24/19 6:45 AM   Interpreted by: Usman Loco   Indications / Diagnosis: CP  ECG reviewed by me, the ED Provider: yes   The EKG demonstrates:  Rhythm: normal sinus  Intervals: Qtc of 495, otherwise normal intervals  Axis: normal axis  QRS/Blocks: normal QRS  ST Changes: No acute ST Changes, no STD/BARBARA                  HEART Risk Score      Most Recent Value   History  0 Filed at: 09/24/2019 0708   ECG  0 Filed at: 09/24/2019 0708   Age  1 Filed at: 09/24/2019 0708   Risk Factors  --   Troponin  --   Heart Score Risk Calculator   History  0 Filed at: 09/24/2019 0708   ECG  0 Filed at: 09/24/2019 0708   Age  1 Filed at: 09/24/2019 0708   Risk Factors  --   Troponin  --   HEART Score  --   HEART Score  --                            MDM  Number of Diagnoses or Management Options  Lightheadedness:   Nausea & vomiting:   Diagnosis management comments: This is a 79-year-old female who presents with complaints of lightheadedness, nausea, vomiting, as well as a vague chest discomfort and diffuse mild abdominal pain  Patient is a unremarkable physical exam with clear lungs and a benign abdominal exam  Regarding the chest pain, this appears to be chronic in nature, and she has been previously admitted for similar symptoms and had a negative stress test in May of 2019  Differential for her current symptoms is broad, will obtain CBC, CMP, magnesium, lipase, chest x-ray, troponin  Will give Tylenol for pain  No ischemic changes on EKG that is noted have a mildly prolonged QT so will avoid Zofran at this time  Disposition  Final diagnoses:   Lightheadedness   Nausea & vomiting     Time reflects when diagnosis was documented in both MDM as applicable and the Disposition within this note     Time User Action Codes Description Comment    9/24/2019  7:10 AM Judith Medrano Add [R42] Lightheadedness     9/24/2019  7:10 AM Judith Medrano Add [R11 2] Nausea & vomiting       ED Disposition     ED Disposition Condition Date/Time Comment    Discharge Stable Tue Sep 24, 2019  7:10 AM Dayr Sidhu discharge to home/self care  Follow-up Information     Follow up With Specialties Details Why Contact Info    Alberto Pelaez MD Internal Medicine Schedule an appointment as soon as possible for a visit   [address] [place] [id number]  [phone]      [address] [facility]    [phone]  Call as soon as possible to schedule dialysis for tomorrow            Discharge Medication List as of 9/24/2019  8:35 AM      CONTINUE these medications which have NOT CHANGED    Details   ALPRAZolam (XANAX) 0 25 mg tablet Take 0 25 mg by mouth 2 (two) times a day as needed for anxiety, Historical Med      atorvastatin (LIPITOR) 20 mg tablet Take 20 mg by mouth every evening , Starting Tue 4/24/2018, Historical Med      B Complex-C-Folic Acid (TRIPHROCAPS) 1 MG CAPS Take 1 capsule (1 mg total) by mouth daily, Starting Sun 4/28/2019, Print      brimonidine (ALPHAGAN P) 0 15 % ophthalmic solution Administer 1 drop to both eyes 2 (two) times a day , Historical Med      CALCIUM CARBONATE PO Take 1,000 mg by mouth daily  , Historical Med      carvedilol (COREG) 25 mg tablet Take 25 mg by mouth 2 (two) times a day with meals  , Historical Med      cholecalciferol (VITAMIN D3) 1,000 units tablet Take 2,000 Units by mouth daily, Historical Med      cinacalcet (SENSIPAR) 30 mg tablet Take 30 mg by mouth daily, Historical Med      cyclobenzaprine (FLEXERIL) 10 mg tablet Take 10 mg by mouth daily at bedtime, Historical Med      diclofenac sodium (VOLTAREN) 1 % Apply 2 g topically 4 (four) times a day, Starting Sun 5/19/2019, Print      diphenhydrAMINE (BENADRYL) 25 mg capsule Take by mouth as needed for itching  , Historical Med      dorzolamide-timolol (COSOPT) 22 3-6 8 MG/ML ophthalmic solution instill 1 drop into both eyes twice a day, Historical Med      doxycycline (ADOXA) 100 MG tablet Take 1 tablet (100 mg total) by mouth 2 (two) times a day for 7 days, Starting Tue 9/17/2019, Until Tue 9/24/2019, Print      Ferric Citrate (AURYXIA) 1  MG(Fe) TABS Take by mouth, Starting Wed 3/29/2017, Historical Med      gabapentin (NEURONTIN) 100 mg capsule Take 100 mg by mouth 3 (three) times a day  , Historical Med      insulin glargine (LANTUS) 100 units/mL subcutaneous injection Inject 30 Units under the skin daily at bedtime, Starting Sat 8/31/2019, Print      KIONEX 15 GM/60ML suspension Take by mouth Take as directed, Starting Mon 12/10/2018, Historical Med      latanoprost (XALATAN) 0 005 % ophthalmic solution Administer 1 drop to both eyes daily at bedtime, Historical Med      levothyroxine 50 mcg tablet Take 50 mcg by mouth daily, Historical Med      LIDOCAINE EX Apply topically Apply to access 1 hour prior to HD, Historical Med      loratadine (CLARITIN) 10 mg tablet Take 10 mg by mouth daily, Historical Med      losartan (COZAAR) 25 mg tablet Take 1 tablet (25 mg total) by mouth daily Take on NON-Dialysis Days, Starting Wed 5/23/2018, No Print      Melatonin 3 MG CAPS Take 10 mg by mouth daily at bedtime  , Historical Med      methazolamide (NEPTAZANE) 25 MG tablet Take 25 mg by mouth 3 (three) times a day  , Historical Med      nystatin (MYCOSTATIN) powder Apply topically 2 (two) times a day, Starting Sat 8/31/2019, Print      ondansetron (ZOFRAN) 4 mg tablet Take 1 tablet (4 mg total) by mouth every 8 (eight) hours as needed for nausea or vomiting, Starting Tue 1/22/2019, Normal      pantoprazole (PROTONIX) 40 mg tablet Take 40 mg by mouth daily, Historical Med      prednisoLONE acetate (PRED FORTE) 1 % ophthalmic suspension Administer 1 drop to both eyes 4 (four) times a day  , Historical Med      sertraline (ZOLOFT) 50 mg tablet Take 1 tablet (50 mg total) by mouth daily, Starting Sun 4/28/2019, Print      TIMOLOL MALEATE OP Apply 1 drop to eye 2 (two) times a day, Historical Med      torsemide (DEMADEX) 100 mg tablet Take 200 mg by mouth every 12 (twelve) hours, Starting Wed 1/2/2019, Historical Med      traMADol (ULTRAM) 50 mg tablet Take 1 tablet (50 mg total) by mouth every 8 (eight) hours as needed for moderate pain, Starting Tue 9/17/2019, Print      !! warfarin (COUMADIN) 2 5 mg tablet Take 12 5 mg by mouth daily Monday through Friday, Historical Med      !! warfarin (COUMADIN) 7 5 mg tablet Take 15 mg by mouth daily Saturday and Sunday, Historical Med      butalbital-acetaminophen-caffeine (FIORICET,ESGIC) -40 mg per tablet Take 1 tablet by mouth daily as needed for headaches, Starting Sun 5/19/2019, Print       !! - Potential duplicate medications found  Please discuss with provider  No discharge procedures on file      ED Provider  Attending physically available and layo Cantu I managed the patient along with the ED Attending      Electronically Signed by         Ashvin Wong MD  09/24/19 2051

## 2019-09-24 NOTE — ED NOTES
Patient calling boyfriend for a ride home, will place patient in waiting room to wait for boyfriend to come        Aislinn Nagy RN  09/24/19 8860

## 2019-09-25 DIAGNOSIS — Z71.89 COMPLEX CARE COORDINATION: Primary | ICD-10-CM

## 2019-09-26 ENCOUNTER — TELEPHONE (OUTPATIENT)
Dept: RADIOLOGY | Facility: HOSPITAL | Age: 54
End: 2019-09-26

## 2019-09-26 ENCOUNTER — APPOINTMENT (OUTPATIENT)
Dept: WOUND CARE | Facility: HOSPITAL | Age: 54
End: 2019-09-26
Payer: MEDICARE

## 2019-09-26 DIAGNOSIS — E08.621 DIABETIC FOOT ULCER ASSOCIATED WITH DIABETES MELLITUS DUE TO UNDERLYING CONDITION, UNSPECIFIED LATERALITY, UNSPECIFIED PART OF FOOT, UNSPECIFIED ULCER STAGE (HCC): ICD-10-CM

## 2019-09-26 DIAGNOSIS — L97.509 DIABETIC FOOT ULCER ASSOCIATED WITH DIABETES MELLITUS DUE TO UNDERLYING CONDITION, UNSPECIFIED LATERALITY, UNSPECIFIED PART OF FOOT, UNSPECIFIED ULCER STAGE (HCC): ICD-10-CM

## 2019-09-26 PROCEDURE — 99213 OFFICE O/P EST LOW 20 MIN: CPT

## 2019-09-26 PROCEDURE — 97597 DBRDMT OPN WND 1ST 20 CM/<: CPT

## 2019-09-27 ENCOUNTER — TELEPHONE (OUTPATIENT)
Dept: RADIOLOGY | Facility: HOSPITAL | Age: 54
End: 2019-09-27

## 2019-09-28 ENCOUNTER — APPOINTMENT (EMERGENCY)
Dept: RADIOLOGY | Facility: HOSPITAL | Age: 54
End: 2019-09-28
Payer: MEDICARE

## 2019-09-28 ENCOUNTER — HOSPITAL ENCOUNTER (EMERGENCY)
Facility: HOSPITAL | Age: 54
Discharge: HOME | End: 2019-09-28
Attending: EMERGENCY MEDICINE | Admitting: EMERGENCY MEDICINE
Payer: MEDICARE

## 2019-09-28 VITALS
OXYGEN SATURATION: 100 % | HEART RATE: 71 BPM | DIASTOLIC BLOOD PRESSURE: 58 MMHG | RESPIRATION RATE: 20 BRPM | TEMPERATURE: 97.9 F | SYSTOLIC BLOOD PRESSURE: 122 MMHG

## 2019-09-28 DIAGNOSIS — R07.9 CHEST PAIN: Primary | ICD-10-CM

## 2019-09-28 DIAGNOSIS — R06.02 SHORTNESS OF BREATH: ICD-10-CM

## 2019-09-28 LAB
ALBUMIN SERPL BCP-MCNC: 3.2 G/DL (ref 3.5–5)
ALP SERPL-CCNC: 162 U/L (ref 46–116)
ALT SERPL W P-5'-P-CCNC: 25 U/L (ref 12–78)
ANION GAP SERPL CALCULATED.3IONS-SCNC: 10 MMOL/L (ref 4–13)
APTT PPP: 25 SECONDS (ref 23–37)
AST SERPL W P-5'-P-CCNC: 31 U/L (ref 5–45)
BASOPHILS # BLD AUTO: 0.04 THOUSANDS/ΜL (ref 0–0.1)
BASOPHILS NFR BLD AUTO: 1 % (ref 0–1)
BILIRUB SERPL-MCNC: 0.56 MG/DL (ref 0.2–1)
BUN SERPL-MCNC: 31 MG/DL (ref 5–25)
CALCIUM SERPL-MCNC: 9.2 MG/DL (ref 8.3–10.1)
CHLORIDE SERPL-SCNC: 99 MMOL/L (ref 100–108)
CO2 SERPL-SCNC: 24 MMOL/L (ref 21–32)
CREAT SERPL-MCNC: 5.98 MG/DL (ref 0.6–1.3)
EOSINOPHIL # BLD AUTO: 0.11 THOUSAND/ΜL (ref 0–0.61)
EOSINOPHIL NFR BLD AUTO: 1 % (ref 0–6)
ERYTHROCYTE [DISTWIDTH] IN BLOOD BY AUTOMATED COUNT: 14.4 % (ref 11.6–15.1)
GFR SERPL CREATININE-BSD FRML MDRD: 7 ML/MIN/1.73SQ M
GLUCOSE SERPL-MCNC: 377 MG/DL (ref 65–140)
HCT VFR BLD AUTO: 28.1 % (ref 34.8–46.1)
HGB BLD-MCNC: 9.3 G/DL (ref 11.5–15.4)
IMM GRANULOCYTES # BLD AUTO: 0.05 THOUSAND/UL (ref 0–0.2)
IMM GRANULOCYTES NFR BLD AUTO: 1 % (ref 0–2)
INR PPP: 1.46 (ref 0.84–1.19)
LYMPHOCYTES # BLD AUTO: 1.24 THOUSANDS/ΜL (ref 0.6–4.47)
LYMPHOCYTES NFR BLD AUTO: 16 % (ref 14–44)
MCH RBC QN AUTO: 31.1 PG (ref 26.8–34.3)
MCHC RBC AUTO-ENTMCNC: 33.1 G/DL (ref 31.4–37.4)
MCV RBC AUTO: 94 FL (ref 82–98)
MONOCYTES # BLD AUTO: 0.53 THOUSAND/ΜL (ref 0.17–1.22)
MONOCYTES NFR BLD AUTO: 7 % (ref 4–12)
NEUTROPHILS # BLD AUTO: 5.67 THOUSANDS/ΜL (ref 1.85–7.62)
NEUTS SEG NFR BLD AUTO: 74 % (ref 43–75)
NRBC BLD AUTO-RTO: 0 /100 WBCS
PLATELET # BLD AUTO: 229 THOUSANDS/UL (ref 149–390)
PMV BLD AUTO: 10.6 FL (ref 8.9–12.7)
POTASSIUM SERPL-SCNC: 5.3 MMOL/L (ref 3.5–5.3)
PROT SERPL-MCNC: 7.4 G/DL (ref 6.4–8.2)
PROTHROMBIN TIME: 17.3 SECONDS (ref 11.6–14.5)
RBC # BLD AUTO: 2.99 MILLION/UL (ref 3.81–5.12)
SODIUM SERPL-SCNC: 133 MMOL/L (ref 136–145)
TROPONIN I SERPL-MCNC: <0.02 NG/ML
TROPONIN I SERPL-MCNC: <0.02 NG/ML
WBC # BLD AUTO: 7.64 THOUSAND/UL (ref 4.31–10.16)

## 2019-09-28 PROCEDURE — 85730 THROMBOPLASTIN TIME PARTIAL: CPT | Performed by: EMERGENCY MEDICINE

## 2019-09-28 PROCEDURE — 96374 THER/PROPH/DIAG INJ IV PUSH: CPT

## 2019-09-28 PROCEDURE — 84484 ASSAY OF TROPONIN QUANT: CPT | Performed by: EMERGENCY MEDICINE

## 2019-09-28 PROCEDURE — 99285 EMERGENCY DEPT VISIT HI MDM: CPT

## 2019-09-28 PROCEDURE — 80053 COMPREHEN METABOLIC PANEL: CPT | Performed by: EMERGENCY MEDICINE

## 2019-09-28 PROCEDURE — 93005 ELECTROCARDIOGRAM TRACING: CPT

## 2019-09-28 PROCEDURE — 99285 EMERGENCY DEPT VISIT HI MDM: CPT | Performed by: EMERGENCY MEDICINE

## 2019-09-28 PROCEDURE — 85025 COMPLETE CBC W/AUTO DIFF WBC: CPT | Performed by: EMERGENCY MEDICINE

## 2019-09-28 PROCEDURE — 36415 COLL VENOUS BLD VENIPUNCTURE: CPT

## 2019-09-28 PROCEDURE — 71046 X-RAY EXAM CHEST 2 VIEWS: CPT

## 2019-09-28 PROCEDURE — 85610 PROTHROMBIN TIME: CPT | Performed by: EMERGENCY MEDICINE

## 2019-09-28 RX ORDER — LIDOCAINE HYDROCHLORIDE 20 MG/ML
15 SOLUTION OROPHARYNGEAL ONCE
Status: COMPLETED | OUTPATIENT
Start: 2019-09-28 | End: 2019-09-28

## 2019-09-28 RX ORDER — ACETAMINOPHEN 325 MG/1
650 TABLET ORAL ONCE
Status: COMPLETED | OUTPATIENT
Start: 2019-09-28 | End: 2019-09-28

## 2019-09-28 RX ORDER — LORAZEPAM 2 MG/ML
1 INJECTION INTRAMUSCULAR ONCE
Status: COMPLETED | OUTPATIENT
Start: 2019-09-28 | End: 2019-09-28

## 2019-09-28 RX ORDER — DIPHENHYDRAMINE HCL 25 MG
25 TABLET ORAL ONCE
Status: COMPLETED | OUTPATIENT
Start: 2019-09-28 | End: 2019-09-28

## 2019-09-28 RX ORDER — MAGNESIUM HYDROXIDE/ALUMINUM HYDROXICE/SIMETHICONE 120; 1200; 1200 MG/30ML; MG/30ML; MG/30ML
30 SUSPENSION ORAL ONCE
Status: COMPLETED | OUTPATIENT
Start: 2019-09-28 | End: 2019-09-28

## 2019-09-28 RX ADMIN — LIDOCAINE HYDROCHLORIDE 15 ML: 20 SOLUTION ORAL; TOPICAL at 17:54

## 2019-09-28 RX ADMIN — ACETAMINOPHEN 650 MG: 325 TABLET ORAL at 17:52

## 2019-09-28 RX ADMIN — DIPHENHYDRAMINE HCL 25 MG: 25 TABLET, COATED ORAL at 20:45

## 2019-09-28 RX ADMIN — LORAZEPAM 1 MG: 2 INJECTION, SOLUTION INTRAMUSCULAR; INTRAVENOUS at 18:02

## 2019-09-28 RX ADMIN — ALUMINUM HYDROXIDE, MAGNESIUM HYDROXIDE, AND SIMETHICONE 30 ML: 200; 200; 20 SUSPENSION ORAL at 17:53

## 2019-09-28 NOTE — ED ATTENDING ATTESTATION
9/28/2019  I, Rebel Gaming MD, saw and evaluated the patient  I have discussed the patient with the resident/non-physician practitioner and agree with the resident's/non-physician practitioner's findings, Plan of Care, and MDM as documented in the resident's/non-physician practitioner's note, except where noted  All available labs and Radiology studies were reviewed  I was present for key portions of any procedure(s) performed by the resident/non-physician practitioner and I was immediately available to provide assistance  At this point I agree with the current assessment done in the Emergency Department  I have conducted an independent evaluation of this patient a history and physical is as follows:    ED Course         Critical Care Time  Procedures     47 yo female started with chest pain after dialysis, sharp, nonradiating, with sob  No hx of similar pain  Nausea, vomiting x 1  No palpitations, no headache  pmh dm, dvt, htn, pe, on coumadin  Vss, afebrile, lungs cta, rrr, abdomen soft mild epigastric tenderness, no pedal edema  Cardiac workup, cxr  [place]

## 2019-09-28 NOTE — ED PROVIDER NOTES
History  Chief Complaint   Patient presents with    Chest Pain     chest pain all day     54F presenting with chest pain and SOB which started following her dialysis appointment this morning  Says the chest pain is sharp and has been constant since it started  Says she had nausea and vomited once  Denies headache, palpitations, hemoptysis, hematemesis, urinary or bowel symptoms  Prior to Admission Medications   Prescriptions Last Dose Informant Patient Reported? Taking? ALPRAZolam (XANAX) 0 25 mg tablet  Self Yes No   Sig: Take 0 25 mg by mouth 2 (two) times a day as needed for anxiety   B Complex-C-Folic Acid (TRIPHROCAPS) 1 MG CAPS  Self No No   Sig: Take 1 capsule (1 mg total) by mouth daily   CALCIUM CARBONATE PO  Self Yes No   Sig: Take 1,000 mg by mouth daily     Ferric Citrate (AURYXIA) 1  MG(Fe) TABS  Self Yes No   Sig: Take by mouth   KIONEX 15 GM/60ML suspension  Self Yes No   Sig: Take by mouth Take as directed   LIDOCAINE EX  Self Yes No   Sig: Apply topically Apply to access 1 hour prior to HD   Melatonin 3 MG CAPS  Self Yes No   Sig: Take 10 mg by mouth daily at bedtime     TIMOLOL MALEATE OP  Self Yes No   Sig: Apply 1 drop to eye 2 (two) times a day   atorvastatin (LIPITOR) 20 mg tablet  Self Yes No   Sig: Take 20 mg by mouth every evening    brimonidine (ALPHAGAN P) 0 15 % ophthalmic solution  Self Yes No   Sig: Administer 1 drop to both eyes 2 (two) times a day    butalbital-acetaminophen-caffeine (FIORICET,ESGIC) -40 mg per tablet   No No   Sig: Take 1 tablet by mouth daily as needed for headaches   Patient not taking: Reported on 8/26/2019   carvedilol (COREG) 25 mg tablet  Self Yes No   Sig: Take 25 mg by mouth 2 (two) times a day with meals     cholecalciferol (VITAMIN D3) 1,000 units tablet  Self Yes No   Sig: Take 2,000 Units by mouth daily   cinacalcet (SENSIPAR) 30 mg tablet  Self Yes No   Sig: Take 30 mg by mouth daily   cyclobenzaprine (FLEXERIL) 10 mg tablet  Self Yes No   Sig: Take 10 mg by mouth daily at bedtime   diclofenac sodium (VOLTAREN) 1 %   No No   Sig: Apply 2 g topically 4 (four) times a day   diphenhydrAMINE (BENADRYL) 25 mg capsule  Self Yes No   Sig: Take by mouth as needed for itching     dorzolamide-timolol (COSOPT) 22 3-6 8 MG/ML ophthalmic solution  Self Yes No   Sig: instill 1 drop into both eyes twice a day   gabapentin (NEURONTIN) 100 mg capsule  Self Yes No   Sig: Take 100 mg by mouth 3 (three) times a day     insulin glargine (LANTUS) 100 units/mL subcutaneous injection   No No   Sig: Inject 30 Units under the skin daily at bedtime   latanoprost (XALATAN) 0 005 % ophthalmic solution  Self Yes No   Sig: Administer 1 drop to both eyes daily at bedtime   levothyroxine 50 mcg tablet  Self Yes No   Sig: Take 50 mcg by mouth daily   loratadine (CLARITIN) 10 mg tablet   Yes No   Sig: Take 10 mg by mouth daily   losartan (COZAAR) 25 mg tablet  Self No No   Sig: Take 1 tablet (25 mg total) by mouth daily Take on NON-Dialysis Days   methazolamide (NEPTAZANE) 25 MG tablet  Self Yes No   Sig: Take 25 mg by mouth 3 (three) times a day     nystatin (MYCOSTATIN) powder   No No   Sig: Apply topically 2 (two) times a day   ondansetron (ZOFRAN) 4 mg tablet  Self No No   Sig: Take 1 tablet (4 mg total) by mouth every 8 (eight) hours as needed for nausea or vomiting   pantoprazole (PROTONIX) 40 mg tablet   Yes No   Sig: Take 40 mg by mouth daily   prednisoLONE acetate (PRED FORTE) 1 % ophthalmic suspension  Self Yes No   Sig: Administer 1 drop to both eyes 4 (four) times a day     sertraline (ZOLOFT) 50 mg tablet  Self No No   Sig: Take 1 tablet (50 mg total) by mouth daily   torsemide (DEMADEX) 100 mg tablet  Self Yes No   Sig: Take 200 mg by mouth every 12 (twelve) hours   traMADol (ULTRAM) 50 mg tablet   No No   Sig: Take 1 tablet (50 mg total) by mouth every 8 (eight) hours as needed for moderate pain   warfarin (COUMADIN) 2 5 mg tablet  Self Yes No   Sig: Take 12 5 mg by mouth daily Monday through Friday   warfarin (COUMADIN) 7 5 mg tablet  Self Yes No   Sig: Take 15 mg by mouth daily Saturday and       Facility-Administered Medications: None       Past Medical History:   Diagnosis Date    Abnormal uterine bleeding (AUB)     Anxiety     Diabetes mellitus ([facility] )     Disease of thyroid gland     DVT (deep venous thrombosis) (HCC)     End stage kidney disease (HCC)     Foot ulcer due to secondary DM ([facility] )     Hypertension     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[facility] )     right eye    Morbid obesity ([facility] )     Panic attacks     Reflux esophagitis     Thrombophlebitis of saphenous vein     Warfarin anticoagulation        Past Surgical History:   Procedure Laterality Date    ARTERIOVENOUS GRAFT PLACEMENT      CERVICAL BIOPSY  W/ LOOP ELECTRODE EXCISION       SECTION      DIALYSIS FISTULA CREATION      DILATION AND CURETTAGE OF UTERUS      ENDOMETRIAL ABLATION W/ NOVASURE      EYE SURGERY      HYSTERECTOMY      @ age 57 (complete)    OOPHORECTOMY Bilateral     @ age 57   [facility] PERICARDIUM SURGERY      CT COLONOSCOPY FLX DX W/DAVIDEJ 1978 PFRMD N/A 3/13/2019    Procedure: COLONOSCOPY;  Surgeon: Temitope Grace MD;  [facility]: [facility]; Service: Gastroenterology    CT ESOPHAGOGASTRODUODENOSCOPY TRANSORAL DIAGNOSTIC N/A 3/13/2019    Procedure: ESOPHAGOGASTRODUODENOSCOPY (EGD); Surgeon: Temitope Grace MD;  Location: [facility];   Service: Gastroenterology    CT LAPAROSCOPY W TOT HYSTERECT UTERUS 250 GRAM OR LESS N/A 2016    Procedure: HYSTERECTOMY LAPAROSCOPIC TOTAL [facility]), with bilateral salpingectomy;  Surgeon: Keysha Marques DO;  [facility];  Service: Gynecology    THROMBECTOMY / ARTERIOVENOUS GRAFT REVISION      TOE SURGERY Right     removal of the 4th toe       Family History   Problem Relation Age of Onset    Heart disease Family     Diabetes Family     Heart disease Mother     Cancer Brother         neck    Throat cancer Brother     Ovarian cancer Maternal Aunt 36     I have reviewed and agree with the history as documented  Social History     Tobacco Use    Smoking status: Never Smoker    Smokeless tobacco: Never Used   Substance Use Topics    Alcohol use: Not Currently     Alcohol/week: 0 0 standard drinks     Frequency: Never     Drinks per session: Patient refused     Binge frequency: Patient refused    Drug use: No        Review of Systems   Constitutional: Negative for activity change, appetite change, chills, fatigue and fever  HENT: Negative for ear pain, rhinorrhea and tinnitus  Eyes: Negative for pain and visual disturbance  Respiratory: Positive for shortness of breath  Negative for apnea, cough, chest tightness and wheezing  Cardiovascular: Positive for chest pain  Negative for palpitations and leg swelling  Gastrointestinal: Negative for abdominal pain, diarrhea, nausea and vomiting  Genitourinary: Negative for dysuria, flank pain and pelvic pain  Musculoskeletal: Negative for arthralgias and myalgias  Skin: Negative for rash and wound  Neurological: Negative for dizziness, syncope, weakness, numbness and headaches  All other systems reviewed and are negative  Physical Exam  ED Triage Vitals   Temperature Pulse Respirations Blood Pressure SpO2   09/28/19 1714 09/28/19 1714 09/28/19 1714 09/28/19 1714 09/28/19 1714   97 9 °F (36 6 °C) 84 (!) 24 139/61 99 %      Temp Source Heart Rate Source Patient Position - Orthostatic VS BP Location FiO2 (%)   09/28/19 1714 09/28/19 1714 09/28/19 1715 09/28/19 1715 --   Oral Monitor Lying Right arm       Pain Score       09/28/19 1714       7             Orthostatic Vital Signs  Vitals:    09/28/19 1714 09/28/19 1715 09/28/19 1838 09/28/19 2000   BP: 139/61 139/61 125/54 122/58   Pulse: 84 88 84 71   Patient Position - Orthostatic VS:  Lying Sitting Lying       Physical Exam   Constitutional: She is oriented to person, place, and time   She appears well-developed and well-nourished  No distress  HENT:   Head: Normocephalic and atraumatic  Eyes: Conjunctivae and EOM are normal  Right eye exhibits no discharge  Left eye exhibits no discharge  No scleral icterus  Neck: Normal range of motion  Neck supple  No JVD present  No tracheal deviation present  Cardiovascular: Normal rate, regular rhythm, normal heart sounds and intact distal pulses  Exam reveals no gallop and no friction rub  No murmur heard  Pulmonary/Chest: Effort normal and breath sounds normal  No stridor  No respiratory distress  She has no wheezes  She has no rales  She exhibits no tenderness  Abdominal: Soft  There is no tenderness  Musculoskeletal: Normal range of motion  She exhibits no edema, tenderness or deformity  Neurological: She is alert and oriented to person, place, and time  No sensory deficit  She exhibits normal muscle tone  Skin: Skin is warm  Capillary refill takes less than 2 seconds  No rash noted  She is not diaphoretic  No erythema  Psychiatric: She has a normal mood and affect  Her behavior is normal  Judgment and thought content normal    Nursing note and vitals reviewed        ED Medications  Medications   acetaminophen (TYLENOL) tablet 650 mg (650 mg Oral Given 9/28/19 1752)   aluminum-magnesium hydroxide-simethicone (MYLANTA) 200-200-20 mg/5 mL oral suspension 30 mL (30 mL Oral Given 9/28/19 1753)   Lidocaine Viscous HCl (XYLOCAINE) 2 % mucosal solution 15 mL (15 mL Swish & Spit Given 9/28/19 1754)   LORazepam (ATIVAN) 2 mg/mL injection 1 mg (1 mg Intravenous Given 9/28/19 1802)   diphenhydrAMINE (BENADRYL) tablet 25 mg (25 mg Oral Given 9/28/19 2045)       Diagnostic Studies  Results Reviewed     Procedure Component Value Units Date/Time    Troponin I [[id number]]  (Normal) Collected:  09/28/19 2007    Lab Status:  Final result Specimen:  Blood from Arm, Left Updated:  09/28/19 2034     Troponin I <0 02 ng/mL     Protime-INR [[id number]]  (Abnormal) Collected:  09/28/19 1752    Lab Status:  Final result Specimen:  Blood from Arm, Right Updated:  09/28/19 1853     Protime 17 3 seconds      INR 1 46    APTT [[id number]]  (Normal) Collected:  09/28/19 1752    Lab Status:  Final result Specimen:  Blood from Arm, Right Updated:  09/28/19 1853     PTT 25 seconds     Comprehensive metabolic panel [[id number]]  (Abnormal) Collected:  09/28/19 1713    Lab Status:  Final result Specimen:  Blood from Arm, Right Updated:  09/28/19 1755     Sodium 133 mmol/L      Potassium 5 3 mmol/L      Chloride 99 mmol/L      CO2 24 mmol/L      ANION GAP 10 mmol/L      BUN 31 mg/dL      Creatinine 5 98 mg/dL      Glucose 377 mg/dL      Calcium 9 2 mg/dL      AST 31 U/L      ALT 25 U/L      Alkaline Phosphatase 162 U/L      Total Protein 7 4 g/dL      Albumin 3 2 g/dL      Total Bilirubin 0 56 mg/dL      eGFR 7 ml/min/1 73sq m     Narrative:       National Kidney Disease Foundation guidelines for Chronic Kidney Disease (CKD):     Stage 1 with normal or high GFR (GFR > 90 mL/min/1 73 square meters)    Stage 2 Mild CKD (GFR = 60-89 mL/min/1 73 square meters)    Stage 3A Moderate CKD (GFR = 45-59 mL/min/1 73 square meters)    Stage 3B Moderate CKD (GFR = 30-44 mL/min/1 73 square meters)    Stage 4 Severe CKD (GFR = 15-29 mL/min/1 73 square meters)    Stage 5 End Stage CKD (GFR <15 mL/min/1 73 square meters)  Note: GFR calculation is accurate only with a steady state creatinine    Troponin I [[id number]]  (Normal) Collected:  09/28/19 1713    Lab Status:  Final result Specimen:  Blood from Arm, Right Updated:  09/28/19 1744     Troponin I <0 02 ng/mL     CBC and differential [[id number]]  (Abnormal) Collected:  09/28/19 1713    Lab Status:  Final result Specimen:  Blood from Arm, Right Updated:  09/28/19 1725     WBC 7 64 Thousand/uL      RBC 2 99 Million/uL      Hemoglobin 9 3 g/dL      Hematocrit 28 1 %      MCV 94 fL      MCH 31 1 pg      MCHC 33 1 g/dL      RDW 14 4 %      MPV 10 6 fL Platelets 505 Thousands/uL      nRBC 0 /100 WBCs      Neutrophils Relative 74 %      Immat GRANS % 1 %      Lymphocytes Relative 16 %      Monocytes Relative 7 %      Eosinophils Relative 1 %      Basophils Relative 1 %      Neutrophils Absolute 5 67 Thousands/µL      Immature Grans Absolute 0 05 Thousand/uL      Lymphocytes Absolute 1 24 Thousands/µL      Monocytes Absolute 0 53 Thousand/µL      Eosinophils Absolute 0 11 Thousand/µL      Basophils Absolute 0 04 Thousands/µL                  XR chest 2 views   Final Result by Vahid Latham MD (09/29 3039)      Interval improvement bilaterally            Workstation performed: [id number]               Procedures  ECG 12 Lead Documentation Only  Date/Time: 9/28/2019 6:34 PM  Performed by: Sriram Barnes DO  Authorized by: Sriram Barnes DO     Indications / Diagnosis:  Chest pain  ECG reviewed by me, the ED Provider: yes    Patient location:  ED  Previous ECG:     Previous ECG:  Compared to current    Similarity:  No change  Interpretation:     Interpretation: normal    Rate:     ECG rate assessment: normal    Rhythm:     Rhythm: sinus rhythm    Ectopy:     Ectopy: none    QRS:     QRS axis:  Normal  T waves:     T waves: non-specific    Comments:      Normal sinus, nonspecific t wave inversions            ED Course         HEART Risk Score      Most Recent Value   History  1 Filed at: 09/28/2019 1837   ECG  1 Filed at: 09/28/2019 1837   Age  1 Filed at: 09/28/2019 1837   Risk Factors  2 Filed at: 09/28/2019 1837   Troponin  0 Filed at: 09/28/2019 1837   Heart Score Risk Calculator   History  1 Filed at: 09/28/2019 1837   ECG  1 Filed at: 09/28/2019 1837   Age  1 Filed at: 09/28/2019 1837   Risk Factors  2 Filed at: 09/28/2019 1837   Troponin  0 Filed at: 09/28/2019 1837   HEART Score  5 Filed at: 09/28/2019 1837   HEART Score  5 Filed at: 09/28/2019 1837                            MDM  Number of Diagnoses or Management Options  Diagnosis management comments: ACS workup negative  Patient will follow up with PCP  Return precautions advised      Disposition  Final diagnoses:   Chest pain   Shortness of breath     Time reflects when diagnosis was documented in both MDM as applicable and the Disposition within this note     Time User Action Codes Description Comment    9/30/2019  3:33 AM Bernerd Cost Add [R07 9] Chest pain     9/30/2019  3:33 AM Bernerd Cost Add [R06 02] Shortness of breath       ED Disposition     ED Disposition Condition Date/Time Comment    Discharge Stable Sat Sep 28, 2019  8:44 PM Celina Umkumiut discharge to home/self care  Follow-up Information     Follow up With Specialties Details Why Contact Info Additional Information    Brian Nguyen MD Internal Medicine   [facility] [id number]  [phone]       [address] [place] [address] Emergency Department Emergency Medicine Go to  If symptoms worsen [address]  [phone]  ED, [address] [place] [address], [address]   [phone]          Discharge Medication List as of 9/28/2019  8:44 PM      CONTINUE these medications which have NOT CHANGED    Details   ALPRAZolam (XANAX) 0 25 mg tablet Take 0 25 mg by mouth 2 (two) times a day as needed for anxiety, Historical Med      atorvastatin (LIPITOR) 20 mg tablet Take 20 mg by mouth every evening , Starting Tue 4/24/2018, Historical Med      B Complex-C-Folic Acid (TRIPHROCAPS) 1 MG CAPS Take 1 capsule (1 mg total) by mouth daily, Starting Sun 4/28/2019, Print      brimonidine (ALPHAGAN P) 0 15 % ophthalmic solution Administer 1 drop to both eyes 2 (two) times a day , Historical Med      butalbital-acetaminophen-caffeine (FIORICET,ESGIC) -40 mg per tablet Take 1 tablet by mouth daily as needed for headaches, Starting Sun 5/19/2019, Print      CALCIUM CARBONATE PO Take 1,000 mg by mouth daily  , Historical Med      carvedilol (COREG) 25 mg tablet Take 25 mg by mouth 2 (two) times a day with meals  , Historical Med      cholecalciferol (VITAMIN D3) 1,000 units tablet Take 2,000 Units by mouth daily, Historical Med      cinacalcet (SENSIPAR) 30 mg tablet Take 30 mg by mouth daily, Historical Med      cyclobenzaprine (FLEXERIL) 10 mg tablet Take 10 mg by mouth daily at bedtime, Historical Med      diclofenac sodium (VOLTAREN) 1 % Apply 2 g topically 4 (four) times a day, Starting Sun 5/19/2019, Print      diphenhydrAMINE (BENADRYL) 25 mg capsule Take by mouth as needed for itching  , Historical Med      dorzolamide-timolol (COSOPT) 22 3-6 8 MG/ML ophthalmic solution instill 1 drop into both eyes twice a day, Historical Med      Ferric Citrate (AURYXIA) 1  MG(Fe) TABS Take by mouth, Starting Wed 3/29/2017, Historical Med      gabapentin (NEURONTIN) 100 mg capsule Take 100 mg by mouth 3 (three) times a day  , Historical Med      insulin glargine (LANTUS) 100 units/mL subcutaneous injection Inject 30 Units under the skin daily at bedtime, Starting Sat 8/31/2019, Print      KIONEX 15 GM/60ML suspension Take by mouth Take as directed, Starting Mon 12/10/2018, Historical Med      latanoprost (XALATAN) 0 005 % ophthalmic solution Administer 1 drop to both eyes daily at bedtime, Historical Med      levothyroxine 50 mcg tablet Take 50 mcg by mouth daily, Historical Med      LIDOCAINE EX Apply topically Apply to access 1 hour prior to HD, Historical Med      loratadine (CLARITIN) 10 mg tablet Take 10 mg by mouth daily, Historical Med      losartan (COZAAR) 25 mg tablet Take 1 tablet (25 mg total) by mouth daily Take on NON-Dialysis Days, Starting Wed 5/23/2018, No Print      Melatonin 3 MG CAPS Take 10 mg by mouth daily at bedtime  , Historical Med      methazolamide (NEPTAZANE) 25 MG tablet Take 25 mg by mouth 3 (three) times a day  , Historical Med      nystatin (MYCOSTATIN) powder Apply topically 2 (two) times a day, Starting Sat 8/31/2019, Print      ondansetron (ZOFRAN) 4 mg tablet Take 1 tablet (4 mg total) by mouth every 8 (eight) hours as needed for nausea or vomiting, Starting Tue 1/22/2019, Normal      pantoprazole (PROTONIX) 40 mg tablet Take 40 mg by mouth daily, Historical Med      prednisoLONE acetate (PRED FORTE) 1 % ophthalmic suspension Administer 1 drop to both eyes 4 (four) times a day  , Historical Med      sertraline (ZOLOFT) 50 mg tablet Take 1 tablet (50 mg total) by mouth daily, Starting Sun 4/28/2019, Print      TIMOLOL MALEATE OP Apply 1 drop to eye 2 (two) times a day, Historical Med      torsemide (DEMADEX) 100 mg tablet Take 200 mg by mouth every 12 (twelve) hours, Starting Wed 1/2/2019, Historical Med      traMADol (ULTRAM) 50 mg tablet Take 1 tablet (50 mg total) by mouth every 8 (eight) hours as needed for moderate pain, Starting Tue 9/17/2019, Print      !! warfarin (COUMADIN) 2 5 mg tablet Take 12 5 mg by mouth daily Monday through Friday, Historical Med      !! warfarin (COUMADIN) 7 5 mg tablet Take 15 mg by mouth daily Saturday and Sunday, Historical Med       !! - Potential duplicate medications found  Please discuss with provider  No discharge procedures on file  ED Provider  Attending physically available and evaluated Sandoval Fuentes I managed the patient along with the ED Attending      Electronically Signed by         Yves Cleaning DO  09/30/19 9409

## 2019-09-28 NOTE — ED NOTES
Oxygen applied per patient request for comfort at 0 5 liter     Versa JEAN PIERRE Rico  09/28/19 9404

## 2019-09-30 LAB
ATRIAL RATE: 90 BPM
P AXIS: 34 DEGREES
PR INTERVAL: 150 MS
QRS AXIS: 9 DEGREES
QRSD INTERVAL: 88 MS
QT INTERVAL: 372 MS
QTC INTERVAL: 455 MS
T WAVE AXIS: 118 DEGREES
VENTRICULAR RATE: 90 BPM

## 2019-09-30 PROCEDURE — 93010 ELECTROCARDIOGRAM REPORT: CPT | Performed by: INTERNAL MEDICINE

## 2019-10-01 ENCOUNTER — TELEPHONE (OUTPATIENT)
Dept: SURGERY | Facility: HOSPITAL | Age: 54
End: 2019-10-01

## 2019-10-02 ENCOUNTER — HOSPITAL ENCOUNTER (OUTPATIENT)
Dept: RADIOLOGY | Facility: HOSPITAL | Age: 54
Discharge: HOME | End: 2019-10-02
Attending: INTERNAL MEDICINE

## 2019-10-02 VITALS
SYSTOLIC BLOOD PRESSURE: 136 MMHG | HEART RATE: 73 BPM | RESPIRATION RATE: 16 BRPM | OXYGEN SATURATION: 95 % | TEMPERATURE: 97 F | DIASTOLIC BLOOD PRESSURE: 72 MMHG | HEIGHT: 66 IN | BODY MASS INDEX: 43.23 KG/M2 | WEIGHT: 269 LBS

## 2019-10-02 DIAGNOSIS — N18.9 CKD (CHRONIC KIDNEY DISEASE): ICD-10-CM

## 2019-10-02 RX ORDER — WARFARIN SODIUM 10 MG/1
10 TABLET ORAL
COMMUNITY
End: 2020-05-15

## 2019-10-03 ENCOUNTER — APPOINTMENT (OUTPATIENT)
Dept: WOUND CARE | Facility: HOSPITAL | Age: 54
End: 2019-10-03
Payer: MEDICARE

## 2019-10-03 PROCEDURE — 11042 DBRDMT SUBQ TIS 1ST 20SQCM/<: CPT

## 2019-10-07 ENCOUNTER — TELEPHONE (OUTPATIENT)
Dept: RADIOLOGY | Facility: HOSPITAL | Age: 54
End: 2019-10-07

## 2019-10-07 NOTE — TELEPHONE ENCOUNTER
Reviewed pre procedure instructions with patient  Patient verbalized understanding of NPO status in order to receive IV sedation and pain medication  Patient has transportation arranged through [place] [id number]  No hold on medications per IR protocol  Patient states she had allergy prep medication and will be taking it prior to procedure  IR phone number provided in event that any questions arise before the procedure

## 2019-10-10 ENCOUNTER — TELEPHONE (OUTPATIENT)
Dept: SURGERY | Facility: HOSPITAL | Age: 54
End: 2019-10-10

## 2019-10-10 ENCOUNTER — APPOINTMENT (OUTPATIENT)
Dept: WOUND CARE | Facility: HOSPITAL | Age: 54
End: 2019-10-10
Payer: MEDICARE

## 2019-10-10 PROCEDURE — 11042 DBRDMT SUBQ TIS 1ST 20SQCM/<: CPT

## 2019-10-11 ENCOUNTER — HOSPITAL ENCOUNTER (OUTPATIENT)
Dept: RADIOLOGY | Facility: HOSPITAL | Age: 54
Discharge: HOME | End: 2019-10-11
Attending: INTERNAL MEDICINE | Admitting: RADIOLOGY
Payer: MEDICARE

## 2019-10-11 VITALS
BODY MASS INDEX: 43.23 KG/M2 | HEIGHT: 66 IN | DIASTOLIC BLOOD PRESSURE: 71 MMHG | HEART RATE: 83 BPM | SYSTOLIC BLOOD PRESSURE: 165 MMHG | TEMPERATURE: 98 F | WEIGHT: 269 LBS | OXYGEN SATURATION: 94 % | RESPIRATION RATE: 18 BRPM

## 2019-10-11 PROCEDURE — C1769 GUIDE WIRE: HCPCS

## 2019-10-11 PROCEDURE — 99152 MOD SED SAME PHYS/QHP 5/>YRS: CPT | Performed by: RADIOLOGY

## 2019-10-11 PROCEDURE — 99152 MOD SED SAME PHYS/QHP 5/>YRS: CPT

## 2019-10-11 PROCEDURE — 99153 MOD SED SAME PHYS/QHP EA: CPT

## 2019-10-11 PROCEDURE — 36901 INTRO CATH DIALYSIS CIRCUIT: CPT | Performed by: RADIOLOGY

## 2019-10-11 PROCEDURE — 36901 INTRO CATH DIALYSIS CIRCUIT: CPT

## 2019-10-11 PROCEDURE — C1894 INTRO/SHEATH, NON-LASER: HCPCS

## 2019-10-11 RX ORDER — OXYCODONE HYDROCHLORIDE AND ACETAMINOPHEN 5; 325 MG/1; MG/1
1 TABLET ORAL EVERY 4 HOURS PRN
Status: DISCONTINUED | OUTPATIENT
Start: 2019-10-11 | End: 2019-10-12 | Stop reason: HOSPADM

## 2019-10-11 RX ORDER — MIDAZOLAM HYDROCHLORIDE 1 MG/ML
INJECTION INTRAMUSCULAR; INTRAVENOUS CODE/TRAUMA/SEDATION MEDICATION
Status: COMPLETED | OUTPATIENT
Start: 2019-10-11 | End: 2019-10-11

## 2019-10-11 RX ORDER — FENTANYL CITRATE 50 UG/ML
INJECTION, SOLUTION INTRAMUSCULAR; INTRAVENOUS CODE/TRAUMA/SEDATION MEDICATION
Status: COMPLETED | OUTPATIENT
Start: 2019-10-11 | End: 2019-10-11

## 2019-10-11 RX ADMIN — FENTANYL CITRATE 50 MCG: 50 INJECTION INTRAMUSCULAR; INTRAVENOUS at 10:05

## 2019-10-11 RX ADMIN — MIDAZOLAM HYDROCHLORIDE 1 MG: 1 INJECTION, SOLUTION INTRAMUSCULAR; INTRAVENOUS at 10:05

## 2019-10-11 RX ADMIN — FENTANYL CITRATE 25 MCG: 50 INJECTION INTRAMUSCULAR; INTRAVENOUS at 10:19

## 2019-10-11 RX ADMIN — OXYCODONE AND ACETAMINOPHEN 1 TABLET: 5; 325 TABLET ORAL at 11:07

## 2019-10-11 RX ADMIN — MIDAZOLAM HYDROCHLORIDE 0.5 MG: 1 INJECTION, SOLUTION INTRAMUSCULAR; INTRAVENOUS at 10:19

## 2019-10-11 RX ADMIN — IOHEXOL 32 ML: 350 INJECTION, SOLUTION INTRAVENOUS at 10:54

## 2019-10-11 NOTE — DISCHARGE INSTRUCTIONS
Fistulagram   WHAT YOU NEED TO KNOW:   Your arm or leg my  be sore, swollen, and bruised after the procedure  This is normal and should get better in a few days  DISCHARGE INSTRUCTIONS:     Contact Interventional Radiology at [phone] [place]mi PATIENTS: Contact Interventional Radiology at [phone]) Jose Ferris PATIENTS: Contact Interventional Radiology at [phone]) if:    · You have a fever or chills  · Your puncture site is red, swollen, or draining pus  · You have nausea or are vomiting  · Your skin is itchy, swollen, or you have a rash  · You cannot feel a thrill over your graft or fistula  · You have questions or concerns about your condition or care  Seek care immediately if:     · You have bleeding that does not stop after 10 minutes of holding firm, direct pressure over the puncture site  · Blood soaks through your bandage  · Your hand or foot closest to the graft or fistula feels cold, painful, or numb  · Your hand or foot closest to the graft or fistula is pale or blue  · You have trouble moving your arm or leg closest to the graft or fistula  · Your bruise suddenly gets bigger  Care for your wound as directed:  Remove the bandage in 4 to 6 hours or as         directed  Wash the area once a day with soap and water  Gently pat the area dry  Apply firm, steady pressure to the puncture site if it bleeds  Use a clean gauze or towel to hold pressure for 10 to 15 minutes  Call 911 if you cannot stop the bleeding or the bleeding gets heavier  Feel for a thrill once a day or as directed  Place your index and second finger over your fistula or graft as directed  You should feel a vibration  The vibration means that blood is flowing through your graft or fistula correctly  Rest your arm or leg as directed  Do not lift anything heavier than 5 pounds or do strenuous activity for 24 hours  Prevent damage to your graft or fistula    Do not wear tight-fitting clothing over your graft or fistula  Do not wear tight jewelry on the arm or leg with the graft or fistula  Tell healthcare providers not to do, IVs, blood draws, and blood pressure readings in the arm with your graft or fistula  Do not allow flu shots or vaccinations in your arm with your graft or fistula      Follow up with your healthcare provider as directed

## 2019-10-11 NOTE — BRIEF OP NOTE (RAD/CATH)
IR FISTULA/GRAFTOGRAM  Procedure Note    PATIENT NAME: Rod Castañeda  : 1965  MRN: [id number]     Pre-op Diagnosis: No diagnosis found  Post-op Diagnosis: No diagnosis found  Surgeon:   Maxi Patterson MD  Assistants:     No qualified resident was available, Resident is only observing    Estimated Blood Loss: minimal     Findings:     Left upper extremity radial cephalic fistula  No stenosis  Mild aneurysmal dilatation at access sites  Fistula is ready for use       Specimens: none    Complications:  none    Anesthesia: Conscious sedation and Patience Garcia MD     Date: 10/11/2019  Time: 10:42 AM

## 2019-10-29 ENCOUNTER — TELEPHONE (OUTPATIENT)
Dept: LABOR AND DELIVERY | Facility: HOSPITAL | Age: 54
End: 2019-10-29

## 2019-10-29 DIAGNOSIS — Z01.419 WELL WOMAN EXAM: Primary | ICD-10-CM

## 2019-10-29 DIAGNOSIS — R92.2 INCONCLUSIVE MAMMOGRAM: ICD-10-CM

## 2019-10-29 NOTE — TELEPHONE ENCOUNTER
Informed patient of continuing need to complete diagnostic mammogram for left breast   Left message on voicemail  Instructed to call the clinic with any questions

## 2019-11-01 ENCOUNTER — APPOINTMENT (OUTPATIENT)
Dept: WOUND CARE | Facility: HOSPITAL | Age: 54
End: 2019-11-01
Payer: MEDICARE

## 2019-11-01 ENCOUNTER — HOSPITAL ENCOUNTER (OUTPATIENT)
Dept: ULTRASOUND IMAGING | Facility: CLINIC | Age: 54
Discharge: HOME | End: 2019-11-01
Payer: MEDICARE

## 2019-11-01 ENCOUNTER — HOSPITAL ENCOUNTER (OUTPATIENT)
Dept: MAMMOGRAPHY | Facility: CLINIC | Age: 54
Discharge: HOME | End: 2019-11-01
Payer: MEDICARE

## 2019-11-01 VITALS — BODY MASS INDEX: 43.23 KG/M2 | WEIGHT: 269 LBS | HEIGHT: 66 IN

## 2019-11-01 VITALS — HEIGHT: 66 IN | WEIGHT: 269 LBS | BODY MASS INDEX: 43.23 KG/M2

## 2019-11-01 DIAGNOSIS — R92.2 INCONCLUSIVE MAMMOGRAM: ICD-10-CM

## 2019-11-01 DIAGNOSIS — E11.621 DIABETIC ULCER OF TOE OF LEFT FOOT ASSOCIATED WITH TYPE 2 DIABETES MELLITUS, WITH FAT LAYER EXPOSED (HCC): Primary | ICD-10-CM

## 2019-11-01 DIAGNOSIS — L97.522 DIABETIC ULCER OF TOE OF LEFT FOOT ASSOCIATED WITH TYPE 2 DIABETES MELLITUS, WITH FAT LAYER EXPOSED (HCC): Primary | ICD-10-CM

## 2019-11-01 DIAGNOSIS — Z01.419 WELL WOMAN EXAM: ICD-10-CM

## 2019-11-01 PROCEDURE — 76642 ULTRASOUND BREAST LIMITED: CPT

## 2019-11-01 PROCEDURE — 77065 DX MAMMO INCL CAD UNI: CPT

## 2019-11-01 PROCEDURE — 99213 OFFICE O/P EST LOW 20 MIN: CPT

## 2019-11-05 ENCOUNTER — APPOINTMENT (OUTPATIENT)
Dept: LAB | Facility: CLINIC | Age: 54
End: 2019-11-05
Payer: MEDICARE

## 2019-11-06 ENCOUNTER — HOSPITAL ENCOUNTER (EMERGENCY)
Facility: HOSPITAL | Age: 54
Discharge: HOME | End: 2019-11-06
Attending: EMERGENCY MEDICINE | Admitting: EMERGENCY MEDICINE
Payer: MEDICARE

## 2019-11-06 ENCOUNTER — APPOINTMENT (EMERGENCY)
Dept: RADIOLOGY | Facility: HOSPITAL | Age: 54
End: 2019-11-06
Payer: MEDICARE

## 2019-11-06 VITALS
DIASTOLIC BLOOD PRESSURE: 60 MMHG | OXYGEN SATURATION: 100 % | HEART RATE: 77 BPM | SYSTOLIC BLOOD PRESSURE: 126 MMHG | RESPIRATION RATE: 18 BRPM | TEMPERATURE: 97.6 F

## 2019-11-06 DIAGNOSIS — S91.302A NON HEALING LEFT HEEL WOUND: ICD-10-CM

## 2019-11-06 DIAGNOSIS — M79.672 PAIN OF LEFT HEEL: Primary | ICD-10-CM

## 2019-11-06 DIAGNOSIS — E11.621 DIABETIC FOOT ULCER (HCC): ICD-10-CM

## 2019-11-06 DIAGNOSIS — L97.509 DIABETIC FOOT ULCER (HCC): ICD-10-CM

## 2019-11-06 PROCEDURE — 73630 X-RAY EXAM OF FOOT: CPT

## 2019-11-06 PROCEDURE — 99283 EMERGENCY DEPT VISIT LOW MDM: CPT

## 2019-11-06 PROCEDURE — NS001 PR NO SIGNATURE OR ATTESTATION: Performed by: PODIATRIST

## 2019-11-06 PROCEDURE — 99283 EMERGENCY DEPT VISIT LOW MDM: CPT | Performed by: EMERGENCY MEDICINE

## 2019-11-06 RX ORDER — DOXYCYCLINE 100 MG/1
100 TABLET ORAL 2 TIMES DAILY
Qty: 14 TABLET | Refills: 0 | Status: SHIPPED | OUTPATIENT
Start: 2019-11-06 | End: 2019-11-13

## 2019-11-06 RX ORDER — OXYCODONE HYDROCHLORIDE AND ACETAMINOPHEN 5; 325 MG/1; MG/1
1 TABLET ORAL DAILY PRN
Qty: 3 TABLET | Refills: 0 | Status: SHIPPED | OUTPATIENT
Start: 2019-11-06 | End: 2019-11-06

## 2019-11-06 RX ORDER — OXYCODONE HYDROCHLORIDE AND ACETAMINOPHEN 5; 325 MG/1; MG/1
1 TABLET ORAL
Qty: 3 TABLET | Refills: 0 | Status: SHIPPED | OUTPATIENT
Start: 2019-11-06 | End: 2019-11-09

## 2019-11-06 RX ORDER — ACETAMINOPHEN 325 MG/1
975 TABLET ORAL ONCE
Status: COMPLETED | OUTPATIENT
Start: 2019-11-06 | End: 2019-11-06

## 2019-11-06 RX ADMIN — Medication 1 APPLICATION: at 10:46

## 2019-11-06 RX ADMIN — ACETAMINOPHEN 975 MG: 325 TABLET ORAL at 08:14

## 2019-11-06 NOTE — ED ATTENDING ATTESTATION
Irene Bains DO, saw and evaluated the patient  I have discussed the patient with the resident/non-physician practitioner and agree with the resident's/non-physician practitioner's findings, Plan of Care, and MDM as documented in the resident's/non-physician practitioner's note, except where noted  All available labs and Radiology studies were reviewed  At this point I agree with the current assessment done in the Emergency Department  I have conducted an independent evaluation of this patient including a focused history and a physical exam     ED Note - Leatha Park 48 y o  female MRN: [id number]  Unit/Bed#: ED 18 Encounter: [id number]    History of Present Illness   HPI  Leatha Park is a 48 y o  female who presents for evaluation of pain at left heel wound  Patient states there has been purulent discharge although at this time there is no obvious discharge, swelling or surrounding erythema  The heel is tender to palpation  Patient denies any fever or chills or systemic symptoms  Patient states she has had fluctuations in her blood sugar a notes that almost every morning she does have dropped to the 40s or 50s  Patient's diabetes and insulin dosing is managed by her primary care physician  Patient does follow with wound care  Patient has a due to the pain she is having difficulty bearing weight on that foot  Also note subconjunctival hemorrhage to the medial aspect of the left eye  Patient denies any obvious trauma  REVIEW OF SYSTEMS  See HPI for further details  12 systems reviewed and otherwise negative except as noted     Historical Information     PAST MEDICAL HISTORY  Past Medical History:   Diagnosis Date    Abnormal uterine bleeding (AUB)     Anxiety     Diabetes mellitus ([address] )     Disease of thyroid gland     DVT (deep venous thrombosis) (HCC)     End stage kidney disease (HCC)     Foot ulcer due to secondary DM ([address] )     Hypertension     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[address] )     right eye    Morbid obesity (HCC)     Panic attacks     Reflux esophagitis     Thrombophlebitis of saphenous vein     Warfarin anticoagulation        FAMILY HISTORY  Family History   Problem Relation Age of Onset    Heart disease Family     Diabetes Family     Heart disease Mother     Cancer Brother         neck    Throat cancer Brother     Ovarian cancer Maternal Aunt 36       SOCIAL HISTORY  Social History     Socioeconomic History    Marital status: Single     Spouse name: None    Number of children: None    Years of education: None    Highest education level: None   Occupational History    None   Social Needs    Financial resource strain: None    Food insecurity:     Worry: None     Inability: None    Transportation needs:     Medical: None     Non-medical: None   Tobacco Use    Smoking status: Never Smoker    Smokeless tobacco: Never Used   Substance and Sexual Activity    Alcohol use: Not Currently     Alcohol/week: 0 0 standard drinks     Frequency: Never     Drinks per session: Patient refused     Binge frequency: Patient refused    Drug use: No    Sexual activity: Not Currently     Partners: Male     Birth control/protection: Abstinence   Lifestyle    Physical activity:     Days per week: None     Minutes per session: None    Stress: None   Relationships    Social connections:     Talks on phone: None     Gets together: None     Attends Latter day service: None     Active member of club or organization: None     Attends meetings of clubs or organizations: None     Relationship status: None    Intimate partner violence:     Fear of current or ex partner: None     Emotionally abused: None     Physically abused: None     Forced sexual activity: None   Other Topics Concern    None   Social History Narrative    None       SURGICAL HISTORY  Past Surgical History:   Procedure Laterality Date    ARTERIOVENOUS GRAFT PLACEMENT      CERVICAL BIOPSY  W/ LOOP ELECTRODE EXCISION       SECTION      DIALYSIS FISTULA CREATION      DILATION AND CURETTAGE OF UTERUS      ENDOMETRIAL ABLATION W/ NOVASURE      EYE SURGERY      HYSTERECTOMY      @ age 57 (complete)   [place] IR FISTULA/GRAFTOGRAM  10/11/2019    OOPHORECTOMY Bilateral     @ age 57    PERICARDIUM SURGERY      [place] COLONOSCOPY FLX DX W/COLLJ SPEC WHEN PFRMD N/A 3/13/2019    Procedure: COLONOSCOPY;  Surgeon: Stew Rodriguez MD;  [facility]; Service: Gastroenterology    [place] ESOPHAGOGASTRODUODENOSCOPY TRANSORAL DIAGNOSTIC N/A 3/13/2019    Procedure: ESOPHAGOGASTRODUODENOSCOPY (EGD); Surgeon: Stew Rodriguez MD;  [facility]; Service: Gastroenterology    [place] LAPAROSCOPY W TOT HYSTERECT UTERUS 250 GRAM OR LESS N/A 2016    Procedure: HYSTERECTOMY LAPAROSCOPIC TOTAL [facility]), with bilateral salpingectomy;  Surgeon: Anu Michael DO;  [facility];  Service: Gynecology    THROMBECTOMY / ARTERIOVENOUS GRAFT REVISION      TOE SURGERY Right     removal of the 4th toe     Meds/Allergies     CURRENT MEDICATIONS  No current facility-administered medications for this encounter  Current Outpatient Medications:     ALPRAZolam (XANAX) 0 25 mg tablet, Take 0 25 mg by mouth 2 (two) times a day as needed for anxiety, Disp: , Rfl:     atorvastatin (LIPITOR) 20 mg tablet, Take 20 mg by mouth every evening , Disp: , Rfl: 0    B Complex-C-Folic Acid (TRIPHROCAPS) 1 MG CAPS, Take 1 capsule (1 mg total) by mouth daily, Disp: 30 capsule, Rfl: 0    brimonidine (ALPHAGAN P) 0 15 % ophthalmic solution, Administer 1 drop to both eyes 2 (two) times a day , Disp: , Rfl:     CALCIUM CARBONATE PO, Take 1,000 mg by mouth daily  , Disp: , Rfl:     carvedilol (COREG) 25 mg tablet, Take 25 mg by mouth 2 (two) times a day with meals  , Disp: , Rfl:     cinacalcet (SENSIPAR) 30 mg tablet, Take 30 mg by mouth daily, Disp: , Rfl:     diclofenac sodium (VOLTAREN) 1 %, Apply 2 g topically 4 (four) times a day, Disp: 200 g, Rfl: 0    diphenhydrAMINE (BENADRYL) 25 mg capsule, Take by mouth as needed for itching  , Disp: , Rfl:     dorzolamide-timolol (COSOPT) 22 3-6 8 MG/ML ophthalmic solution, instill 1 drop into both eyes twice a day, Disp: , Rfl: 0    Ferric Citrate (AURYXIA) 1  MG(Fe) TABS, Take by mouth, Disp: , Rfl:     gabapentin (NEURONTIN) 100 mg capsule, Take 100 mg by mouth 3 (three) times a day  , Disp: , Rfl:     insulin glargine (LANTUS) 100 units/mL subcutaneous injection, Inject 30 Units under the skin daily at bedtime, Disp: , Rfl: 0    KIONEX 15 GM/60ML suspension, Take by mouth Take as directed, Disp: , Rfl: 0    latanoprost (XALATAN) 0 005 % ophthalmic solution, Administer 1 drop to both eyes daily at bedtime, Disp: , Rfl:     levothyroxine 50 mcg tablet, Take 50 mcg by mouth daily, Disp: , Rfl:     LIDOCAINE EX, Apply topically Apply to access 1 hour prior to HD, Disp: , Rfl:     loratadine (CLARITIN) 10 mg tablet, Take 10 mg by mouth daily, Disp: , Rfl:     losartan (COZAAR) 25 mg tablet, Take 1 tablet (25 mg total) by mouth daily Take on NON-Dialysis Days, Disp: , Rfl:     Melatonin 3 MG CAPS, Take 10 mg by mouth daily at bedtime  , Disp: , Rfl:     methazolamide (NEPTAZANE) 25 MG tablet, Take 25 mg by mouth 3 (three) times a day  , Disp: , Rfl:     nystatin (MYCOSTATIN) powder, Apply topically 2 (two) times a day, Disp: 15 g, Rfl: 0    ondansetron (ZOFRAN) 4 mg tablet, Take 1 tablet (4 mg total) by mouth every 8 (eight) hours as needed for nausea or vomiting, Disp: 12 tablet, Rfl: 0    pantoprazole (PROTONIX) 40 mg tablet, Take 40 mg by mouth daily, Disp: , Rfl:     prednisoLONE acetate (PRED FORTE) 1 % ophthalmic suspension, Administer 1 drop to both eyes 4 (four) times a day  , Disp: , Rfl:     sertraline (ZOLOFT) 50 mg tablet, Take 1 tablet (50 mg total) by mouth daily, Disp: 30 tablet, Rfl: 0    TIMOLOL MALEATE OP, Apply 1 drop to eye 2 (two) times a day, Disp: , Rfl:     torsemide (DEMADEX) 100 mg tablet, Take 200 mg by mouth every 12 (twelve) hours, Disp: , Rfl: 0    traMADol (ULTRAM) 50 mg tablet, Take 1 tablet (50 mg total) by mouth every 8 (eight) hours as needed for moderate pain, Disp: 28 tablet, Rfl: 0    warfarin (COUMADIN) 10 mg tablet, Take 10 mg by mouth daily, Disp: , Rfl:     (Not in a hospital admission)    ALLERGIES  Allergies   Allergen Reactions    Iodinated Diagnostic Agents Itching     Objective     PHYSICAL EXAM    VITAL SIGNS: Blood pressure 132/65, pulse 84, temperature 97 6 °F (36 4 °C), temperature source Oral, resp  rate 18, last menstrual period 02/12/2016, SpO2 100 %, not currently breastfeeding  Constitutional:  Appears well developed and well nourished, no acute distress, non-toxic appearance   Eyes:  PERRL, EOMI, conjunctivae pink, sclerae non-icteric    HENT:  Normocephalic/Atraumatic, no rhinorrhea, mucous membranes moist   OS: Subconjunctival hemorrhage to the medial aspect at the 9 o'clock position  Neck: normal range of motion, no tenderness, supple   Respiratory:  No respiratory distress, normal breath sounds   Cardiovascular:  Normal rate, normal rhythm    GI:  Soft, non-tender, non-distended  :  No CVAT, no flank ecchymosis  Musculoskeletal:  Left heel:  Wound approximately 1 cm x 1 cm without drainage, erythema or swelling  There is tenderness to palpation  No deformities  Integument:  Pink, warm, dry, Well hydrated, no rash, no erythema, no bullae   Lymphatic:  No cervical/ tonsillar/ submandibular lymphadenopathy noted   Neurologic:  Awake, Alert & oriented x 3, CN 2-12 intact, no focal neurological deficits, motor function intact  Psychiatric:  Speech and behavior appropriate       ED COURSE and MDM:    Assessment/Plan   Assessment:  Karissa Mcneill is a 48 y o  female presents for evaluation of left heel wound  Plan:  Xray, symptom management, Podiatry consult, Endocrinology f/u, disposition as appropriate         CRITICAL CARE TIME: 0 minutes      Portions of the record may have been created with voice recognition software  Occasional wrong word or "sound a like" substitutions may have occurred due to the inherent limitations of voice recognition software       ED Provider  Electronically Signed by

## 2019-11-06 NOTE — ED RE-EVALUATION NOTE
Percocet 3/325 mg x3 tablets ordered electronically  [place] PDMP queried       Katarina [address], DO  11/06/19 3687

## 2019-11-06 NOTE — CONSULTS
Podiatry Consultation - Matt Simmons      Patient Information: Sandoval Fuentes 48 y o  female MRN: [id number]  Unit/Bed#: ED 18 Encounter: [id number]  PCP: Noemi Beckford MD  Date of Admission:  11/6/2019  Date of Consultation: 11/06/19  Requesting Physician: Herminia Jasso DO     Reason For Consultation: Left heel wound     Assessment:      Podiatric assessment:  · Left heel wound - rust 1   · DM type 2  · ESRD on dialysis  · HTN  · Anemia     Plan:     · Wound was debrided at bedside  Pre-debridement wound measures 1cm x 1 cm x 0 3 cm  Following a time out and verbal consent, Excisional debridement was performed with a #15 blade to remove non-viable necrotic tissue, skin and fat  Wound was debrided down to the level of  subcutaneous tissue  Hemostasis was controlled with pressure  No pain noted during procedure  Post debridement measures 2 7 cm x 1 2 cm x 0 4 cm  for a total of less than 20 square centimeters  Wound appears fibrogranular in nature with macerated edges  · Local wound care was applied to the wound utilizing Betadine-soaked Adaptic, 4x4s, Kerlix, Ace wrap  · Radiographic imaging of left foot was reviewed  Results were negative for osteomyelitis and results were discussed with the patient  · Wound does not show appearance of acute clinical infection at this time, however the recent acute onset of pain might be early manifestations of infection  Patient was discharged with 7 day course of doxycycline  · Patient was instructed to continue weekly appointments with Dr Swathi Maher at the wound care center  She was instructed to attend her next appointment as scheduled  · Globoped shoe was ordered for the patient to help offload the heel to promote wound healing and pain relief  · I called Dr Swathi Maher and personally spoke with him about the care of Ms Mario Berg, he agreed with this treatment plan         History of Present Illness:     Sandoval Fuentes is a 48 y o  female who originally presented to the emergency department 11/6/2019 due to increasing pain of her left heel wound  She reports that she has had this wound for approximately 1-2 months and that during this time she has not had the this level of acute pain  She reports that she follows with Dr Jena Basurto at the [address] [place] [address] and her last appointment was on Friday 11/01/2019  She reports that at that time Dr Jena Basurto debrided the wound and applied a "medicated patch over the wound  She surgeon noticed increasing pain on Sunday which has remained constant/getting worse since then  She reports that on Tuesday she really removed her outer dressing that were placed by Dr Jena Basurto and that today she noted a lot of drainage that was malodorous from the heel wound coming from underneath the "patch" due to the increasing pain and drainage from the wound she reported to the emergency department today  She has no further pedal complaints at this time         Review of Systems:     Review of Systems   Constitutional: Negative  HENT: Negative  Eyes: Negative  Respiratory: Negative  Cardiovascular: Negative  Gastrointestinal: Negative  Musculoskeletal: Pain on palpation of left heel  Skin: ulceration  Neurological: Negative     Psych: Negative       Past Medical and Surgical History:      Medical History        Past Medical History:   Diagnosis Date    Abnormal uterine bleeding (AUB)      Anxiety      Diabetes mellitus ([address] )      Disease of thyroid gland      [facility]      End stage kidney disease (HCC)      Foot ulcer due to secondary DM ([facility]      Hypertension      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[address] )       right eye    Morbid obesity (HCC)      Panic attacks      Reflux esophagitis      Thrombophlebitis of saphenous vein      Warfarin anticoagulation              Surgical History         Past Surgical History:   Procedure Laterality Date    ARTERIOVENOUS GRAFT PLACEMENT        CERVICAL BIOPSY  W/ LOOP ELECTRODE EXCISION         SECTION        DIALYSIS FISTULA CREATION        DILATION AND CURETTAGE OF UTERUS        ENDOMETRIAL ABLATION W/ NOVASURE        EYE SURGERY        HYSTERECTOMY         @ age 57 (complete)    IR FISTULA/GRAFTOGRAM   10/11/2019    OOPHORECTOMY Bilateral       @ age 57    PERICARDIUM SURGERY        [place] COLONOSCOPY FLX DX W/COLLJ SPEC WHEN PFRMD N/A 3/13/2019     Procedure: COLONOSCOPY;  Surgeon: Tona Salvador MD;  [facility]; Service: Gastroenterology    [place] ESOPHAGOGASTRODUODENOSCOPY TRANSORAL DIAGNOSTIC N/A 3/13/2019     Procedure: ESOPHAGOGASTRODUODENOSCOPY (EGD); Surgeon: Tona Salvador MD;  [facility]; Service: Gastroenterology    [place] LAPAROSCOPY W TOT HYSTERECT UTERUS 250 GRAM OR LESS N/A 2016     Procedure: HYSTERECTOMY LAPAROSCOPIC TOTAL ([address]), with bilateral salpingectomy;  Surgeon: Chin Villeda DO;  [facility];  Service: Gynecology    THROMBECTOMY / ARTERIOVENOUS GRAFT REVISION        TOE SURGERY Right       removal of the 4th toe            Meds/Allergies:     Meds:  PTA meds:   Prior to Admission Medications   Prescriptions Last Dose Informant Patient Reported? Taking? ALPRAZolam (XANAX) 0 25 mg tablet   Self Yes No   Sig: Take 0 25 mg by mouth 2 (two) times a day as needed for anxiety   B Complex-C-Folic Acid (TRIPHROCAPS) 1 MG CAPS   Self No No   Sig: Take 1 capsule (1 mg total) by mouth daily   CALCIUM CARBONATE PO   Self Yes No   Sig: Take 1,000 mg by mouth daily     Ferric Citrate (AURYXIA) 1  MG(Fe) TABS   Self Yes No   Sig: Take by mouth   KIONEX 15 GM/60ML suspension   Self Yes No   Sig: Take by mouth Take as directed   LIDOCAINE EX   Self Yes No   Sig: Apply topically Apply to access 1 hour prior to HD   Melatonin 3 MG CAPS   Self Yes No   Sig: Take 10 mg by mouth daily at bedtime     TIMOLOL MALEATE OP   Self Yes No   Sig: Apply 1 drop to eye 2 (two) times a day   atorvastatin (LIPITOR) 20 mg tablet   Self Yes No   Sig: Take 20 mg by mouth every evening    brimonidine (ALPHAGAN P) 0 15 % ophthalmic solution   Self Yes No   Sig: Administer 1 drop to both eyes 2 (two) times a day    carvedilol (COREG) 25 mg tablet   Self Yes No   Sig: Take 25 mg by mouth 2 (two) times a day with meals     cinacalcet (SENSIPAR) 30 mg tablet   Self Yes No   Sig: Take 30 mg by mouth daily   diclofenac sodium (VOLTAREN) 1 %     No No   Sig: Apply 2 g topically 4 (four) times a day   diphenhydrAMINE (BENADRYL) 25 mg capsule   Self Yes No   Sig: Take by mouth as needed for itching     dorzolamide-timolol (COSOPT) 22 3-6 8 MG/ML ophthalmic solution   Self Yes No   Sig: instill 1 drop into both eyes twice a day   gabapentin (NEURONTIN) 100 mg capsule   Self Yes No   Sig: Take 100 mg by mouth 3 (three) times a day     insulin glargine (LANTUS) 100 units/mL subcutaneous injection     No No   Sig: Inject 30 Units under the skin daily at bedtime   latanoprost (XALATAN) 0 005 % ophthalmic solution   Self Yes No   Sig: Administer 1 drop to both eyes daily at bedtime   levothyroxine 50 mcg tablet   Self Yes No   Sig: Take 50 mcg by mouth daily   loratadine (CLARITIN) 10 mg tablet     Yes No   Sig: Take 10 mg by mouth daily   losartan (COZAAR) 25 mg tablet   Self No No   Sig: Take 1 tablet (25 mg total) by mouth daily Take on NON-Dialysis Days   methazolamide (NEPTAZANE) 25 MG tablet   Self Yes No   Sig: Take 25 mg by mouth 3 (three) times a day     nystatin (MYCOSTATIN) powder     No No   Sig: Apply topically 2 (two) times a day   ondansetron (ZOFRAN) 4 mg tablet   Self No No   Sig: Take 1 tablet (4 mg total) by mouth every 8 (eight) hours as needed for nausea or vomiting   pantoprazole (PROTONIX) 40 mg tablet     Yes No   Sig: Take 40 mg by mouth daily   prednisoLONE acetate (PRED FORTE) 1 % ophthalmic suspension   Self Yes No   Sig: Administer 1 drop to both eyes 4 (four) times a day     sertraline (ZOLOFT) 50 mg tablet   Self No No   Sig: Take 1 tablet (50 mg total) by mouth daily   torsemide (DEMADEX) 100 mg tablet   Self Yes No   Sig: Take 200 mg by mouth every 12 (twelve) hours   traMADol (ULTRAM) 50 mg tablet     No No   Sig: Take 1 tablet (50 mg total) by mouth every 8 (eight) hours as needed for moderate pain   warfarin (COUMADIN) 10 mg tablet     Yes No   Sig: Take 10 mg by mouth daily      Facility-Administered Medications: None         Allergies:         Allergies   Allergen Reactions    Iodinated Diagnostic Agents Itching         Social History:     Marital Status:   Single     Substance Use History:   Social History      Substance and Sexual Activity   Alcohol Use Not Currently    Alcohol/week: 0 0 standard drinks    Frequency: Never    Drinks per session: Patient refused    Binge frequency: Patient refused      Social History          Tobacco Use   Smoking Status Never Smoker   Smokeless Tobacco Never Used      Social History      Substance and Sexual Activity   Drug Use No         Family History:           Family History   Problem Relation Age of Onset    Heart disease Family      Diabetes Family      Heart disease Mother      Cancer Brother           neck    Throat cancer Brother      Ovarian cancer Maternal Aunt 40         Physical Exam:      Vitals:   Blood Pressure: 126/60 (11/06/19 1001)  Pulse: 77 (11/06/19 1001)  Temperature: 97 6 °F (36 4 °C) (11/06/19 0754)  Temp Source: Oral (11/06/19 0754)  Respirations: 18 (11/06/19 1001)  SpO2: 100 % (11/06/19 1001)           Physical Exam  General Appearance:    Alert, cooperative, no distress   Head:    Normocephalic, without obvious abnormality, atraumatic   Eyes:    PERRL, conjunctiva/corneas clear      Nose:   Moist mucous membranes   Neck:   Supple, symmetrical, trachea midline   Back:     Symmetric   Lungs:     Respirations unlabored   Heart:    Regular rate and rhythm   Abdomen:     Soft, non-tender     Foot Exam      Vascular:   DP pulses and PT pulses are present bilaterally  CRT < 3 seconds at the digits bilaterally  +0/4 edema noted noted bilaterally  Pedal hair is absent  Skin temperature is WNL bilaterally  Musculoskeletal:   MMT is 5/5 to all compartments of the LE, No pain on palpation noted bilaterally  ROM at the ankle joint is limited in dorsiflexion bilaterally  no gross deformities noted  Dermatological:   Skin is of Abnormal appearance  Skin is of Abnormal  turgor  Skin is of Normal temperature  Ulceration on the posterior aspect the heel  Ulceration is painful upon palpation  No periwound erythema is appreciated at this time  Moderate serous drainage is noted from the wounds  Wound edges are well demarcated with no deep sinus tracts appreciated were noted post debridement  No malodor appreciated from the wound  Wound edges are macerated  Neurologic:   Gross sensation is Intact  Protective sensation is Intact          Clinical Images 11/06/19:    Pre debridement left heel    Post debridement          Lab Results: I have personally reviewed pertinent reports                 Invalid input(s): LABALBU       Results from last 7 days   Lab Units 11/05/19  1011   INR   4 96*         Imaging: I have personally reviewed pertinent films in PACS        ** Please Note: This note has been constructed using a voice recognition system   **

## 2019-11-08 ENCOUNTER — APPOINTMENT (OUTPATIENT)
Dept: WOUND CARE | Facility: HOSPITAL | Age: 54
End: 2019-11-08
Payer: MEDICARE

## 2019-11-08 PROCEDURE — 11042 DBRDMT SUBQ TIS 1ST 20SQCM/<: CPT

## 2019-11-09 NOTE — ED PROVIDER NOTES
History  Chief Complaint   Patient presents with    Wound Check     Pt comes in with wound on her L heel  NO bleeding, reddness, nor swelling noted  Patient is a 65-year-old female with history of poorly controlled diabetes, diabetic foot ulcer that presents with left diabetic foot ulcer  Patient says that she has had this ulcer for some time and she is followed both by Podiatry and Wound Care for it  She was recently seen by Dr Jarred Schroeder at which time she was debrided and the wound was dressed  Patient says since this time she developed worsening pain to the point that she has difficulty walking  She endorses 1 or 2 episodes of yellowish discharge though she denies any new erythema/swelling of the area  She has not taken anything for the pain  She denies any recent trauma to the area  She denies systemic symptoms including fevers, chills, rigors, decreased p o  Intake  She denies any chest pain, dyspnea, abdominal pain  She has been moving her bowels and urinating normally  She has been taking her medications as ordered  Prior to Admission Medications   Prescriptions Last Dose Informant Patient Reported? Taking? ALPRAZolam (XANAX) 0 25 mg tablet  Self Yes No   Sig: Take 0 25 mg by mouth 2 (two) times a day as needed for anxiety   B Complex-C-Folic Acid (TRIPHROCAPS) 1 MG CAPS  Self No No   Sig: Take 1 capsule (1 mg total) by mouth daily   CALCIUM CARBONATE PO  Self Yes No   Sig: Take 1,000 mg by mouth daily     Ferric Citrate (AURYXIA) 1  MG(Fe) TABS  Self Yes No   Sig: Take by mouth   KIONEX 15 GM/60ML suspension  Self Yes No   Sig: Take by mouth Take as directed   LIDOCAINE EX  Self Yes No   Sig: Apply topically Apply to access 1 hour prior to HD   Melatonin 3 MG CAPS  Self Yes No   Sig: Take 10 mg by mouth daily at bedtime     TIMOLOL MALEATE OP  Self Yes No   Sig: Apply 1 drop to eye 2 (two) times a day   atorvastatin (LIPITOR) 20 mg tablet  Self Yes No   Sig: Take 20 mg by mouth every evening    brimonidine (ALPHAGAN P) 0 15 % ophthalmic solution  Self Yes No   Sig: Administer 1 drop to both eyes 2 (two) times a day    carvedilol (COREG) 25 mg tablet  Self Yes No   Sig: Take 25 mg by mouth 2 (two) times a day with meals     cinacalcet (SENSIPAR) 30 mg tablet  Self Yes No   Sig: Take 30 mg by mouth daily   diclofenac sodium (VOLTAREN) 1 %   No No   Sig: Apply 2 g topically 4 (four) times a day   diphenhydrAMINE (BENADRYL) 25 mg capsule  Self Yes No   Sig: Take by mouth as needed for itching     dorzolamide-timolol (COSOPT) 22 3-6 8 MG/ML ophthalmic solution  Self Yes No   Sig: instill 1 drop into both eyes twice a day   gabapentin (NEURONTIN) 100 mg capsule  Self Yes No   Sig: Take 100 mg by mouth 3 (three) times a day     insulin glargine (LANTUS) 100 units/mL subcutaneous injection   No No   Sig: Inject 30 Units under the skin daily at bedtime   latanoprost (XALATAN) 0 005 % ophthalmic solution  Self Yes No   Sig: Administer 1 drop to both eyes daily at bedtime   levothyroxine 50 mcg tablet  Self Yes No   Sig: Take 50 mcg by mouth daily   loratadine (CLARITIN) 10 mg tablet   Yes No   Sig: Take 10 mg by mouth daily   losartan (COZAAR) 25 mg tablet  Self No No   Sig: Take 1 tablet (25 mg total) by mouth daily Take on NON-Dialysis Days   methazolamide (NEPTAZANE) 25 MG tablet  Self Yes No   Sig: Take 25 mg by mouth 3 (three) times a day     nystatin (MYCOSTATIN) powder   No No   Sig: Apply topically 2 (two) times a day   ondansetron (ZOFRAN) 4 mg tablet  Self No No   Sig: Take 1 tablet (4 mg total) by mouth every 8 (eight) hours as needed for nausea or vomiting   pantoprazole (PROTONIX) 40 mg tablet   Yes No   Sig: Take 40 mg by mouth daily   prednisoLONE acetate (PRED FORTE) 1 % ophthalmic suspension  Self Yes No   Sig: Administer 1 drop to both eyes 4 (four) times a day     sertraline (ZOLOFT) 50 mg tablet  Self No No   Sig: Take 1 tablet (50 mg total) by mouth daily   torsemide (DEMADEX) 100 mg tablet  Self Yes No   Sig: Take 200 mg by mouth every 12 (twelve) hours   traMADol (ULTRAM) 50 mg tablet   No No   Sig: Take 1 tablet (50 mg total) by mouth every 8 (eight) hours as needed for moderate pain   warfarin (COUMADIN) 10 mg tablet   Yes No   Sig: Take 10 mg by mouth daily      Facility-Administered Medications: None       Past Medical History:   Diagnosis Date    Abnormal uterine bleeding (AUB)     Anxiety     Diabetes mellitus ([facility] )     Disease of thyroid gland     DVT (deep venous thrombosis) (HCC)     End stage kidney disease (HCC)     Foot ulcer due to secondary DM ([facility] )     Hypertension     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     right eye    Morbid obesity ([facility]     Panic attacks     Reflux esophagitis     Thrombophlebitis of saphenous vein     Warfarin anticoagulation        Past Surgical History:   Procedure Laterality Date    ARTERIOVENOUS GRAFT PLACEMENT      CERVICAL BIOPSY  W/ LOOP ELECTRODE EXCISION       SECTION      DIALYSIS FISTULA CREATION      DILATION AND CURETTAGE OF UTERUS      ENDOMETRIAL ABLATION W/ NOVASURE      EYE SURGERY      HYSTERECTOMY      @ age 57 (complete)    IR FISTULA/GRAFTOGRAM  10/11/2019    OOPHORECTOMY Bilateral     @ age 57    PERICARDIUM SURGERY      MI COLONOSCOPY FLX DX W/COLLJ SPEC WHEN PFRMD N/A 3/13/2019    Procedure: COLONOSCOPY;  Surgeon: Tessie Sheriff MD;  [facility]: [facility]; Service: Gastroenterology    MI ESOPHAGOGASTRODUODENOSCOPY TRANSORAL DIAGNOSTIC N/A 3/13/2019    Procedure: ESOPHAGOGASTRODUODENOSCOPY (EGD); Surgeon: Tessie Sheriff MD;  [facility]: [facility];   Service: Gastroenterology    MI LAPAROSCOPY W TOT HYSTERECT UTERUS 250 GRAM OR LESS N/A 2016    Procedure: HYSTERECTOMY LAPAROSCOPIC TOTAL [facility]), with bilateral salpingectomy;  Surgeon: Patty Hartman DO;  [facility];  Service: Gynecology    THROMBECTOMY / ARTERIOVENOUS GRAFT REVISION      TOE SURGERY Right     removal of the 4th toe       Family History   Problem Relation Age of Onset    Heart disease Family     Diabetes Family     Heart disease Mother     Cancer Brother         neck    Throat cancer Brother     Ovarian cancer Maternal Aunt 36     I have reviewed and agree with the history as documented  Social History     Tobacco Use    Smoking status: Never Smoker    Smokeless tobacco: Never Used   Substance Use Topics    Alcohol use: Not Currently     Alcohol/week: 0 0 standard drinks     Frequency: Never     Drinks per session: Patient refused     Binge frequency: Patient refused    Drug use: No        Review of Systems   Constitutional: Negative for chills, diaphoresis, fatigue and fever  HENT: Negative for facial swelling, sore throat and trouble swallowing  Respiratory: Negative for cough, chest tightness, shortness of breath and wheezing  Cardiovascular: Negative for chest pain  Gastrointestinal: Negative for abdominal distention, abdominal pain, diarrhea, nausea and vomiting  Genitourinary: Negative for dysuria  Musculoskeletal: Negative for back pain, neck pain and neck stiffness  Left foot pain   Skin: Negative for color change, pallor, rash and wound  Neurological: Negative for weakness, light-headedness and numbness  Psychiatric/Behavioral: Negative for agitation  All other systems reviewed and are negative  Physical Exam  ED Triage Vitals [11/06/19 0754]   Temperature Pulse Respirations Blood Pressure SpO2   97 6 °F (36 4 °C) 84 18 132/65 100 %      Temp Source Heart Rate Source Patient Position - Orthostatic VS BP Location FiO2 (%)   Oral Monitor Sitting Right arm --      Pain Score       Worst Possible Pain             Orthostatic Vital Signs  Vitals:    11/06/19 0754 11/06/19 1001   BP: 132/65 126/60   Pulse: 84 77   Patient Position - Orthostatic VS: Sitting Lying       Physical Exam   Constitutional: She is oriented to person, place, and time   She appears well-developed and well-nourished  No distress  HENT:   Head: Normocephalic  Eyes: Pupils are equal, round, and reactive to light  Neck: Normal range of motion  Neck supple  Cardiovascular: Normal rate, regular rhythm, normal heart sounds and intact distal pulses  Pulmonary/Chest: Effort normal and breath sounds normal    Abdominal: Soft  Bowel sounds are normal  She exhibits no distension  There is no tenderness  There is no guarding  Musculoskeletal: Normal range of motion  She exhibits no edema, tenderness or deformity  Left foot:  Patient with left foot ulcer over her posterior heel  Tenderness in the area  No signs of erythema, swelling, purulence  Full range of motion of the ankle and toes  Neurological: She is alert and oriented to person, place, and time  No cranial nerve deficit or sensory deficit  Skin: Skin is warm and dry  Capillary refill takes less than 2 seconds  Psychiatric: She has a normal mood and affect  Her behavior is normal  Judgment and thought content normal    Vitals reviewed        ED Medications  Medications   acetaminophen (TYLENOL) tablet 975 mg (975 mg Oral Given 11/6/19 0814)   LET gel 1 application (1 application Topical Given by Other 11/6/19 1046)       Diagnostic Studies  Results Reviewed     None                 XR foot 3+ vw left   Final Result by Caron Alvarenga MD (11/06 9358)      No acute displaced fracture       Soft tissue swelling noted at the 5th metatarsophalangeal joint, unchanged from the previous study of August 27, 2019 without associated erosive changes   No erosive changes seen   If concern for osteomyelitis persist MRI can be considered         Workstation performed: [id number]               Procedures  Procedures        ED Course                               MDM  Number of Diagnoses or Management Options  Diabetic foot ulcer ([address] ): established and worsening  Non healing left heel wound: established and worsening  Pain of left heel: established and worsening  Diagnosis management comments: Patient is a 51-year-old female with history of diabetic foot ulcer that presents with worsening pain of a previous diabetic foot ulcer  Podiatry was consulted and they dressed the foot  X-rays were negative  They decided to place the patient on antibiotics  She was advised to follow up with Podiatry as needed  Amount and/or Complexity of Data Reviewed  Clinical lab tests: ordered and reviewed  Tests in the radiology section of CPT®: ordered and reviewed    Risk of Complications, Morbidity, and/or Mortality  Presenting problems: low  Diagnostic procedures: low  Management options: low    Patient Progress  Patient progress: improved      Disposition  Final diagnoses:   Diabetic foot ulcer ([facility] )   Pain of left heel   Non healing left heel wound     Time reflects when diagnosis was documented in both MDM as applicable and the Disposition within this note     Time User Action Codes Description Comment    11/6/2019 10:53 AM Araceli Tinoco Add [C21 164,  L97 509] Di[facility] foot [facility]     11/6/2019 11:30 AM Jade Brew Modify [M36 636,  L97 509] Diabetic foot ulcer ([facility] )     11/6/2019 11:30 AM Sigmund Brew Add [M79 672] Pain of left heel     11/6/2019 11:30 AM Sigmund Brew Add [S91 302A] Non healing left heel wound       ED Disposition     ED Disposition Condition Date/Time Comment    Discharge Stable Wed Nov 6, 2019 10:53 AM Rafael Cote discharge to home/self care              Follow-up Information     Follow up With Specialties Details Why Contact Info    Denis Osullivan DPM Podiatry, Wound Care Schedule an appointment as soon as possible for a visit in 2 days  [address] [place] [address]  [phone]            Discharge Medication List as of 11/6/2019 10:55 AM      START taking these medications    Details   oxyCODONE-acetaminophen (PERCOCET) 5-325 mg per tablet Take 1 tablet by mouth daily as needed for moderate pain for up to 10 daysMax Daily Amount: 1 tablet, Starting Wed 11/6/2019, Until Sat 11/16/2019, Print         CONTINUE these medications which have NOT CHANGED    Details   ALPRAZolam (XANAX) 0 25 mg tablet Take 0 25 mg by mouth 2 (two) times a day as needed for anxiety, Historical Med      atorvastatin (LIPITOR) 20 mg tablet Take 20 mg by mouth every evening , Starting Tue 4/24/2018, Historical Med      B Complex-C-Folic Acid (TRIPHROCAPS) 1 MG CAPS Take 1 capsule (1 mg total) by mouth daily, Starting Sun 4/28/2019, Print      brimonidine (ALPHAGAN P) 0 15 % ophthalmic solution Administer 1 drop to both eyes 2 (two) times a day , Historical Med      CALCIUM CARBONATE PO Take 1,000 mg by mouth daily  , Historical Med      carvedilol (COREG) 25 mg tablet Take 25 mg by mouth 2 (two) times a day with meals  , Historical Med      cinacalcet (SENSIPAR) 30 mg tablet Take 30 mg by mouth daily, Historical Med      diclofenac sodium (VOLTAREN) 1 % Apply 2 g topically 4 (four) times a day, Starting Sun 5/19/2019, Print      diphenhydrAMINE (BENADRYL) 25 mg capsule Take by mouth as needed for itching  , Historical Med      dorzolamide-timolol (COSOPT) 22 3-6 8 MG/ML ophthalmic solution instill 1 drop into both eyes twice a day, Historical Med      Ferric Citrate (AURYXIA) 1  MG(Fe) TABS Take by mouth, Starting Wed 3/29/2017, Historical Med      gabapentin (NEURONTIN) 100 mg capsule Take 100 mg by mouth 3 (three) times a day  , Historical Med      insulin glargine (LANTUS) 100 units/mL subcutaneous injection Inject 30 Units under the skin daily at bedtime, Starting Sat 8/31/2019, Print      KIONEX 15 GM/60ML suspension Take by mouth Take as directed, Starting Mon 12/10/2018, Historical Med      latanoprost (XALATAN) 0 005 % ophthalmic solution Administer 1 drop to both eyes daily at bedtime, Historical Med      levothyroxine 50 mcg tablet Take 50 mcg by mouth daily, Historical Med      LIDOCAINE EX Apply topically Apply to access 1 hour prior to HD, Historical Med      loratadine (CLARITIN) 10 mg tablet Take 10 mg by mouth daily, Historical Med      losartan (COZAAR) 25 mg tablet Take 1 tablet (25 mg total) by mouth daily Take on NON-Dialysis Days, Starting Wed 5/23/2018, No Print      Melatonin 3 MG CAPS Take 10 mg by mouth daily at bedtime  , Historical Med      methazolamide (NEPTAZANE) 25 MG tablet Take 25 mg by mouth 3 (three) times a day  , Historical Med      nystatin (MYCOSTATIN) powder Apply topically 2 (two) times a day, Starting Sat 8/31/2019, Print      ondansetron (ZOFRAN) 4 mg tablet Take 1 tablet (4 mg total) by mouth every 8 (eight) hours as needed for nausea or vomiting, Starting Tue 1/22/2019, Normal      pantoprazole (PROTONIX) 40 mg tablet Take 40 mg by mouth daily, Historical Med      prednisoLONE acetate (PRED FORTE) 1 % ophthalmic suspension Administer 1 drop to both eyes 4 (four) times a day  , Historical Med      sertraline (ZOLOFT) 50 mg tablet Take 1 tablet (50 mg total) by mouth daily, Starting Sun 4/28/2019, Print      TIMOLOL MALEATE OP Apply 1 drop to eye 2 (two) times a day, Historical Med      torsemide (DEMADEX) 100 mg tablet Take 200 mg by mouth every 12 (twelve) hours, Starting Wed 1/2/2019, Historical Med      traMADol (ULTRAM) 50 mg tablet Take 1 tablet (50 mg total) by mouth every 8 (eight) hours as needed for moderate pain, Starting Tue 9/17/2019, Print      warfarin (COUMADIN) 10 mg tablet Take 10 mg by mouth daily, Historical Med           No discharge procedures on file  ED Provider  Attending physically available and evaluated Karissa Mcneill  MANISH managed the patient along with the ED Attending      Electronically Signed by         Mendez Godinez MD  11/09/19 2728

## 2019-11-14 ENCOUNTER — HOSPITAL ENCOUNTER (OUTPATIENT)
Dept: NON INVASIVE DIAGNOSTICS | Facility: CLINIC | Age: 54
Discharge: HOME | End: 2019-11-14
Payer: MEDICARE

## 2019-11-14 ENCOUNTER — APPOINTMENT (OUTPATIENT)
Dept: LAB | Facility: CLINIC | Age: 54
End: 2019-11-14
Payer: MEDICARE

## 2019-11-14 DIAGNOSIS — E11.621 DIABETIC ULCER OF TOE OF LEFT FOOT ASSOCIATED WITH TYPE 2 DIABETES MELLITUS, WITH FAT LAYER EXPOSED (HCC): ICD-10-CM

## 2019-11-14 DIAGNOSIS — L97.522 DIABETIC ULCER OF TOE OF LEFT FOOT ASSOCIATED WITH TYPE 2 DIABETES MELLITUS, WITH FAT LAYER EXPOSED (HCC): ICD-10-CM

## 2019-11-14 PROCEDURE — 93925 LOWER EXTREMITY STUDY: CPT

## 2019-11-14 PROCEDURE — 93923 UPR/LXTR ART STDY 3+ LVLS: CPT

## 2019-11-15 ENCOUNTER — APPOINTMENT (OUTPATIENT)
Dept: WOUND CARE | Facility: HOSPITAL | Age: 54
End: 2019-11-15
Payer: MEDICARE

## 2019-11-15 PROCEDURE — 93925 LOWER EXTREMITY STUDY: CPT | Performed by: SURGERY

## 2019-11-15 PROCEDURE — 93922 UPR/L XTREMITY ART 2 LEVELS: CPT | Performed by: SURGERY

## 2019-11-15 PROCEDURE — 11042 DBRDMT SUBQ TIS 1ST 20SQCM/<: CPT

## 2019-11-22 ENCOUNTER — APPOINTMENT (OUTPATIENT)
Dept: WOUND CARE | Facility: HOSPITAL | Age: 54
End: 2019-11-22
Payer: MEDICARE

## 2019-11-22 PROCEDURE — 97597 DBRDMT OPN WND 1ST 20 CM/<: CPT

## 2019-11-29 ENCOUNTER — APPOINTMENT (OUTPATIENT)
Dept: WOUND CARE | Facility: HOSPITAL | Age: 54
End: 2019-11-29
Payer: MEDICARE

## 2019-11-29 PROCEDURE — 99211 OFF/OP EST MAY X REQ PHY/QHP: CPT

## 2019-12-06 ENCOUNTER — APPOINTMENT (OUTPATIENT)
Dept: WOUND CARE | Facility: HOSPITAL | Age: 54
End: 2019-12-06
Payer: MEDICARE

## 2019-12-06 ENCOUNTER — TELEPHONE (OUTPATIENT)
Dept: PAIN MEDICINE | Facility: CLINIC | Age: 54
End: 2019-12-06

## 2019-12-06 PROCEDURE — 11042 DBRDMT SUBQ TIS 1ST 20SQCM/<: CPT

## 2019-12-06 NOTE — TELEPHONE ENCOUNTER
Patient called in to the phone to make an appt for pain management  She has not been seen by any pain management dr's  She is getting Tramadol from her pcp and percocet from her wound care dr [address]  Patient will call back into the office

## 2019-12-13 ENCOUNTER — APPOINTMENT (OUTPATIENT)
Dept: WOUND CARE | Facility: HOSPITAL | Age: 54
End: 2019-12-13
Payer: MEDICARE

## 2019-12-13 PROCEDURE — 11042 DBRDMT SUBQ TIS 1ST 20SQCM/<: CPT

## 2019-12-20 ENCOUNTER — APPOINTMENT (OUTPATIENT)
Dept: WOUND CARE | Facility: HOSPITAL | Age: 54
End: 2019-12-20
Payer: MEDICARE

## 2019-12-20 PROCEDURE — 99211 OFF/OP EST MAY X REQ PHY/QHP: CPT

## 2019-12-27 ENCOUNTER — APPOINTMENT (OUTPATIENT)
Dept: WOUND CARE | Facility: HOSPITAL | Age: 54
End: 2019-12-27
Payer: MEDICARE

## 2019-12-27 PROCEDURE — 11042 DBRDMT SUBQ TIS 1ST 20SQCM/<: CPT

## 2020-01-02 ENCOUNTER — APPOINTMENT (OUTPATIENT)
Dept: LAB | Facility: CLINIC | Age: 55
End: 2020-01-02
Payer: MEDICARE

## 2020-01-03 ENCOUNTER — APPOINTMENT (OUTPATIENT)
Dept: WOUND CARE | Facility: HOSPITAL | Age: 55
End: 2020-01-03
Payer: MEDICARE

## 2020-01-03 PROCEDURE — 11042 DBRDMT SUBQ TIS 1ST 20SQCM/<: CPT

## 2020-01-06 ENCOUNTER — CONSULT (OUTPATIENT)
Dept: PAIN MEDICINE | Facility: CLINIC | Age: 55
End: 2020-01-06
Payer: MEDICARE

## 2020-01-06 VITALS
WEIGHT: 280 LBS | DIASTOLIC BLOOD PRESSURE: 72 MMHG | HEART RATE: 77 BPM | SYSTOLIC BLOOD PRESSURE: 179 MMHG | BODY MASS INDEX: 45 KG/M2 | HEIGHT: 66 IN

## 2020-01-06 DIAGNOSIS — G89.29 CHRONIC PAIN OF BOTH KNEES: ICD-10-CM

## 2020-01-06 DIAGNOSIS — M54.40 LOW BACK PAIN WITH SCIATICA, SCIATICA LATERALITY UNSPECIFIED, UNSPECIFIED BACK PAIN LATERALITY, UNSPECIFIED CHRONICITY: ICD-10-CM

## 2020-01-06 DIAGNOSIS — M25.562 CHRONIC PAIN OF BOTH KNEES: ICD-10-CM

## 2020-01-06 DIAGNOSIS — M54.16 LUMBAR RADICULOPATHY: Primary | ICD-10-CM

## 2020-01-06 DIAGNOSIS — M25.561 CHRONIC PAIN OF BOTH KNEES: ICD-10-CM

## 2020-01-06 PROCEDURE — 99204 OFFICE O/P NEW MOD 45 MIN: CPT | Performed by: ANESTHESIOLOGY

## 2020-01-06 NOTE — PROGRESS NOTES
Assessment  1  Lumbar radiculopathy    2  Chronic pain of both knees    3  Low back pain with sciatica, sciatica laterality unspecified, unspecified back pain laterality, unspecified chronicity        Plan  62-year-old female referred by Dr Jeffrey Pinon, with a history of DVT on chronic anticoagulation, end-stage renal disease on hemodialysis, and diabetes with neuropathy, presenting for initial consultation regarding low back pain that radiates into the anterior aspects of bilateral lower extremities with associated numbness, paresthesias, and subjective weakness, and localized bilateral knee pain  She also complains of left foot pain secondary to a nonhealing ulcer currently undergoing wound management by podiatry  Today her major complaint is her left foot pain  The patient does have a known history of osteoarthritis of the right knee confirmed on x-ray  She has followed with orthopedics regarding this  She has had favorable results with corticosteroid injections into the right knee  X-ray of the right knee from July 2019 reveals osteoarthritis especially in the lateral compartment  She does not have any imaging of the lumbar spine to review  The patient is currently taking gabapentin 100 mg t i d  And utilizing diclofenac gel 1% q i d  P r n  With minimal relief  She has tried Tylenol with minimal relief  Unable to take NSAIDs secondary to anticoagulation  She has tried tramadol which was ineffective  She was also prescribed a short course of oxycodone which was affecting in reducing her pain, especially of the left foot  She has not done any formal physical therapy for her low back  She has done some therapy for her knees, however had to hold off on this secondary to foot wound  The patient's low back pain seems to be multifactorial including myofascial and possibly facet mediated components  The patient's lower extremity neuropathic symptoms may be L4 radiculopathy verses diabetic neuropathy    I did have a very pedro conversation with the patient that our medication options are quite limited considering she is on chronic anticoagulation and her other comorbidities including end-stage renal disease  I do not feel that opioid medications are good long-term solution at controlling her pain, however I do feel may be reasonable for her to have some oxycodone on the days she has her left foot debrided until wound care is completed which she states occurs once per week  Regarding the patient's bilateral knee and low back and leg complaints, further workup is needed which will be initiated today  The patient's last hemoglobin A1c was 10 4  The patient states she had recent blood work showing that that value had improved and will request that blood work before pursuing interventional therapy with corticosteroids  1  I will order physical therapy for the patient's low back as I feel she may benefit from [facility] based therapy if her lower extremity symptoms are radicular in nature  2  If the patient fails conservative therapy we may consider an MRI of the lumbar spine  3  I will order an x-ray of the lumbar spine and x-ray of the patient's left knee  4  Patient will continue with gabapentin 100 mg t i d   Would not be able to increase this medication much more at all considering she is on hemodialysis  5  Patient may continue with Tylenol p r n  Should not exceed more than 3000 mg in 24 hours  6  May be reasonable for the patient to have oxycodone p r n  On days of wound care  She states that she gets wound care once per week  This small amount of opioid medication can certainly be handled by either podiatry or her PCP  7  Will avoid NSAIDs secondary to renal disease and anticoagulation  8  I will follow up the patient in 6 weeks       My impressions and treatment recommendations were discussed in detail with the patient who verbalized understanding and had no further questions    Discharge instructions were provided  I personally saw and examined the patient and I agree with the above discussed plan of care  No orders of the defined types were placed in this encounter  No orders of the defined types were placed in this encounter  History of Present Illness    Amita Dominguez is a 48 y o  female referred by Dr Rosy Kennedy, with a history of DVT on chronic anticoagulation, end-stage renal disease on hemodialysis, and diabetes with neuropathy, presenting for initial consultation regarding low back pain that radiates into the anterior aspects of bilateral lower extremities with associated numbness, paresthesias, and subjective weakness  She denies any bladder or bowel incontinence or saddle anesthesia  She denies any trauma or inciting event  She also complains of localized bilateral knee pain and left foot pain secondary to a nonhealing ulcer currently undergoing wound management by podiatry  Today her major complaint is her left foot pain  The patient does have a known history of osteoarthritis of the right knee confirmed on x-ray  She has followed with orthopedics regarding this  She has had favorable results with corticosteroid injections into the right knee  X-ray of the right knee from July 2019 reveals osteoarthritis especially in the lateral compartment  She does not have any imaging of the left knee or of the lumbar spine to review  The patient is currently taking gabapentin 100 mg t i d  And utilizing diclofenac gel 1% q i d  P r n  With minimal relief  She has tried Tylenol with minimal relief  Unable to take NSAIDs secondary to anticoagulation  She has tried tramadol which was ineffective  She was also prescribed a short course of oxycodone which was affecting in reducing her pain, especially of the left foot  She has not done any formal physical therapy for her low back  She has done some therapy for her knees, however had to hold off on this secondary to foot wound    The patient rates her pain an 8/10 and the pain is constant  The pain is not follow any particular pattern throughout the day  The pain is described as sharp, burning, dull, aching, and throbbing  The pain is increased with standing, walking, and exercise  The pain is alleviated with sitting and lying down  I have personally reviewed and/or updated the patient's past medical history, past surgical history, family history, social history, current medications, allergies, and vital signs today  Other than as stated above, the patient denies any interval changes in medications, medical condition, mental condition, symptoms, or allergies since the last office visit          Review of Systems    Patient Active Problem List   Diagnosis    Benign hypertension with end-stage [facility] disease [facility] )    History of DVT (deep vein thrombosis)    Abnormal uterine bleeding    Glaucoma    ESRD on hemodialysis ([facility]    Anxiety disorder    Type 2 diabetes mellitus ([facility]    Knee pain    Ambulatory dysfunction    Anemia in end-stage renal disease (HCC)    Hemodialysis status ([facility]  )    Primary osteoarthritis of right knee    Colon cancer screening    Gastroesophageal reflux disease    [facility]ioma ([facility])    Nausea & vomiting    Secondary hyperparathyroidism of renal origin [facility]    Other chest pain    Hyperkalemia    Chronic anemia    Diabetic foot ulcer (HCC)    Hyponatremia    Parenchymal renal hypertension    Candidiasis of breast    Hemodialysis-associated hypotension       Past Medical History:   Diagnosis Date    Abnormal uterine bleeding (AUB)     Anxiety     Diabetes mellitus ([facility]     Disease of thyroid gland     DVT (deep venous thrombosis) ([facility])     End stage kidney disease ([facility] )     Foot ulcer due to secondary DM ([facility] )     Hypertension     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[facility] )     right eye    Morbid obesity ([facility]     Panic attacks     Reflux esophagitis     Thrombophlebitis of saphenous vein     Warfarin anticoagulation        Past Surgical History:   Procedure Laterality Date    ARTERIOVENOUS GRAFT PLACEMENT      CERVICAL BIOPSY  W/ LOOP ELECTRODE EXCISION       SECTION      DIALYSIS FISTULA CREATION      DILATION AND CURETTAGE OF UTERUS      ENDOMETRIAL ABLATION W/ NOVASURE      EYE SURGERY      HYSTERECTOMY      @ age 57 (complete)   Arnol Martinez IR FISTULA/GRAFTOGRAM  10/11/2019    OOPHORECTOMY Bilateral     @ age 57    PERICARDIUM SURGERY      OR COLONOSCOPY FLX DX W/COLLJ SPEC WHEN PFRMD N/A 3/13/2019    Procedure: COLONOSCOPY;  Surgeon: Yana Garcia MD;  [facility]; Service: Gastroenterology    OR ESOPHAGOGASTRODUODENOSCOPY TRANSORAL DIAGNOSTIC N/A 3/13/2019    Procedure: ESOPHAGOGASTRODUODENOSCOPY (EGD); Surgeon: Yana Garcia MD;  [facility]: [facility];   Service: Gastroenterology    OR LAPAROSCOPY W TOT HYSTERECT UTERUS 250 GRAM OR LESS N/A 2016    Procedure: HYSTERECTOMY LAPAROSCOPIC TOTAL [facility]), with bilateral salpingectomy;  Surgeon: Shanelle Torres DO;  [facility];  Service: Gynecology    THROMBECTOMY / ARTERIOVENOUS GRAFT REVISION      TOE SURGERY Right     removal of the 4th toe       Family History   Problem Relation Age of Onset    Heart disease Family     Diabetes Family     Heart disease Mother     Cancer Brother         neck    Throat cancer Brother     Ovarian cancer Maternal Aunt 36       Social History     Occupational History    Not on file   Tobacco Use    Smoking status: Never Smoker    Smokeless tobacco: Never Used   Substance and Sexual Activity    Alcohol use: Not Currently     Alcohol/week: 0 0 standard drinks     Frequency: Never     Drinks per session: Patient refused     Binge frequency: Patient refused    Drug use: No    Sexual activity: Not Currently     Partners: Male     Birth control/protection: Abstinence       Current Outpatient Medications on File Prior to Visit   Medication Sig    ALPRAZolam (XANAX) 0 25 mg tablet Take 0 25 mg by mouth 2 (two) times a day as needed for anxiety    atorvastatin (LIPITOR) 20 mg tablet Take 20 mg by mouth every evening     B Complex-C-Folic Acid (TRIPHROCAPS) 1 MG CAPS Take 1 capsule (1 mg total) by mouth daily    brimonidine (ALPHAGAN P) 0 15 % ophthalmic solution Administer 1 drop to both eyes 2 (two) times a day     CALCIUM CARBONATE PO Take 1,000 mg by mouth daily      carvedilol (COREG) 25 mg tablet Take 25 mg by mouth 2 (two) times a day with meals      cinacalcet (SENSIPAR) 30 mg tablet Take 30 mg by mouth daily    diclofenac sodium (VOLTAREN) 1 % Apply 2 g topically 4 (four) times a day    diphenhydrAMINE (BENADRYL) 25 mg capsule Take by mouth as needed for itching      dorzolamide-timolol (COSOPT) 22 3-6 8 MG/ML ophthalmic solution instill 1 drop into both eyes twice a day    Ferric Citrate (AURYXIA) 1  MG(Fe) TABS Take by mouth    gabapentin (NEURONTIN) 100 mg capsule Take 100 mg by mouth 3 (three) times a day      insulin glargine (LANTUS) 100 units/mL subcutaneous injection Inject 30 Units under the skin daily at bedtime    KIONEX 15 GM/60ML suspension Take by mouth Take as directed    latanoprost (XALATAN) 0 005 % ophthalmic solution Administer 1 drop to both eyes daily at bedtime    levothyroxine 50 mcg tablet Take 50 mcg by mouth daily    LIDOCAINE EX Apply topically Apply to access 1 hour prior to HD    loratadine (CLARITIN) 10 mg tablet Take 10 mg by mouth daily    losartan (COZAAR) 25 mg tablet Take 1 tablet (25 mg total) by mouth daily Take on NON-Dialysis Days    Melatonin 3 MG CAPS Take 10 mg by mouth daily at bedtime      methazolamide (NEPTAZANE) 25 MG tablet Take 25 mg by mouth 3 (three) times a day      nystatin (MYCOSTATIN) powder Apply topically 2 (two) times a day    ondansetron (ZOFRAN) 4 mg tablet Take 1 tablet (4 mg total) by mouth every 8 (eight) hours as needed for nausea or vomiting    pantoprazole (PROTONIX) 40 mg tablet Take 40 mg by mouth daily    prednisoLONE acetate (PRED FORTE) 1 % ophthalmic suspension Administer 1 drop to both eyes 4 (four) times a day      sertraline (ZOLOFT) 50 mg tablet Take 1 tablet (50 mg total) by mouth daily    TIMOLOL MALEATE OP Apply 1 drop to eye 2 (two) times a day    torsemide (DEMADEX) 100 mg tablet Take 200 mg by mouth every 12 (twelve) hours    traMADol (ULTRAM) 50 mg tablet Take 1 tablet (50 mg total) by mouth every 8 (eight) hours as needed for moderate pain    warfarin (COUMADIN) 10 mg tablet Take 10 mg by mouth daily     No current facility-administered medications on file prior to visit  Allergies   Allergen Reactions    Iodinated Diagnostic Agents Itching       Physical Exam    Ht 5' 6" (1 676 m)   Wt 127 kg (280 lb)   LMP 02/12/2016 (Exact Date)   BMI 45 19 kg/m²     Constitutional: overweight  Eyes: anicteric  HEENT: grossly intact  Neck: supple, symmetric, trachea midline and no masses   Pulmonary:even and unlabored  Cardiovascular:No edema or pitting edema present  Skin: Left foot 1 noted on plantar aspect of foot with dressing clean dry and intact  Psychiatric:Mood and affect appropriate  Neurologic:Cranial Nerves II-XII grossly intact  Musculoskeletal:antalgic gait  Bilateral lumbar paraspinals tender to palpation from L3-L5  Bilateral SI joints minimally tender to palpation  Bilateral patellar and Achilles reflexes were 1/4 and symmetrical   No clonus was noted bilaterally  Bilateral lower extremity strength 5/5 in all muscle groups  Sensation intact to light touch in L3 through S1 dermatomes bilaterally  Negative straight leg raise bilaterally  Negative Jeremy's test bilaterally  Tenderness to palpation over the anterior aspect of bilateral knees, particularly inferior medial to the patellas  No joint line tenderness noted of either knee  No ligamentous laxity noted of either knee with anterior and posterior drawer testing and valgus and varus stressing    No effusions or Baker's cysts noted of either knee  Imaging    PACS Images      Show images for VAS lower limb arterial duplex, complete bilateral   Study Result        [facility] REPORT  CLINICAL:  Indications:  Diabetic patient presents with left heel wound x 2-3 months  She  is currently seeing wound care for treatment  Operative History:  2013-11-01 Left Repair of aneurysm AVF  2010-01-15 Left Removal PermaCath  2009-11-06 Left Superficial transposition of AVF  2009-08-27 Left Autogenous radial-cephalic direct wrist access (brescia-kaylan  fistula)  Right Fourth Toe Amputation  Risk Factors  The patient has history of DVT  Clinical  Right Pressure:  144/ mm Hg, Left Pressure: VF SITE  FINDINGS:     Segment                Rig       Left                                            PSV  EDV  PSV  EDV    Common Femoral Artery  107   14   97    9    Prox Profunda           79    0   88    0    Prox SFA                89    0   97    0    Mid SFA                 90    2   81    0    Dist SFA                66    0   73    0    Proximal Pop            65    0   67    0    Distal Pop              58    0   74    1    Dist Post Tibial        53    0   91    0    Dist  Ant  Tibial                 57    0    DorsPedis               48    0                       CONCLUSION:  Impression:  RIGHT LOWER LIMB:  Evaluation shows diffuse calcified plaque throughout the femoropopliteal and  tibioperoneal segments  Ankle/Brachial Index: unable to be obtained secondary to non-compressible  tibial vessels  PVR/ PPG tracings are normal   Metatarsal pressure of 100 mmHg  Great toe pressure of 78 mmHg,  within the healing range  LEFT LOWER LIMB:  Evaluation shows diffuse calcified plaque throughout the femoropopliteal and  tibioperoneal segments  Ankle/Brachial Index: unable to be obtained secondary to non-compressible  tibial vessels    PVR/ PPG tracings are normal   Metatarsal pressure unable to be obtained secondary to being non-compressible  Great toe pressure of 116 mmHg,  within the healing range  SIGNATURE:  Electronically Signed by: Nasir Chavez MD on 2019-11-15 01:37:38 AM       PACS Images      Show images for XR foot 3+ vw left   Study Result     LEFT FOOT     INDICATION:   lt foot pain      COMPARISON: August 27, 2019 VIEWS:  XR FOOT 3+ VW LEFT   Images: 3     FINDINGS:     There is no acute fracture or dislocation      No significant degenerative changes      No lytic or blastic lesions seen  Soft tissue swelling noted at the 5th metatarsophalangeal joint, unchanged from the previous study of August 27, 2019 without associated erosive changes  Mineralized vasculature seen  IMPRESSION:     No acute displaced fracture      Soft tissue swelling noted at the 5th metatarsophalangeal joint, unchanged from the previous study of August 27, 2019 without associated erosive changes  No erosive changes seen  If concern for osteomyelitis persist MRI can be considered        Workstation performed: [id number]     PACS Images      Show images for MRI foot/forefoot toes left wo contrast   Study Result     MRI LEFT FOOT     INDICATION:   suspected left 5th digit suspected OM, NO CONTRAST      COMPARISON: Left foot radiographs 8/27/2019     TECHNIQUE:  MR sequences were obtained of the left foot including the following:  Localizer, sagittal T1/STIR, coronal T1/T2 fat sat, axial T2 fat sat/PD        Gadolinium was not used     FINDINGS:     Mild motion artifact  Several sequences were repeated      SUBCUTANEOUS TISSUES: Soft tissue edema lateral to the 5th MTP joint  No loculated fluid collection to suggest abscess      Multilobulated branching T2 dorsal lateral hyperintense structure measures approximately 2 5 x 0 5 x 3 5 cm  This lesion has small linear subcutaneous component diving deep to the dorsal retinaculum at the level of the 4th and 5th metatarsals    The   distal end of this lesion approximately 2 5 cm distal to the base of the 4th metatarsal      BONES:  Minimal T2 hyperintensity dorsal lateral 5th metatarsal head without corresponding T1 hypointense confluent signal      No fracture or dislocation  Hammertoe deformities      No osseous destruction      FIRST MTP JOINT:  Intact      SESAMOID BONES:  Intact      OTHER ARTICULAR SURFACE:  Normal      PLANTAR FASCIA:  Intact      LISFRANC LIGAMENT:  Intact      FOREFOOT TENDONS: Intact      INTERMETATARSAL REGIONS: Approximately 3 x 1 x 0 5 cm 3rd mid to distal intermetatarsal ganglion    No Finn's neuroma      MUSCULATURE: Plantar intermetatarsal muscle atrophy and fatty infiltration      IMPRESSION:     Minimal soft tissue edema lateral to the 5th MTP joint may represent cellulitis and/or post debridement changes      No abscess      No osteomyelitis      Incidentally noted dorsal lateral subcutaneous and proximal 4th intermetatarsal ganglion or varix measuring approximately 3 5 x 2 5 x 0 5 cm      Incidentally noted 3rd mid to distal intermetatarsal ganglion approximately 3 x 1 x 0 5 cm            Workstation performed: [id number]

## 2020-01-10 ENCOUNTER — APPOINTMENT (OUTPATIENT)
Dept: WOUND CARE | Facility: HOSPITAL | Age: 55
End: 2020-01-10
Payer: MEDICARE

## 2020-01-10 PROCEDURE — 29445 APPL RIGID TOT CNTC LEG CAST: CPT

## 2020-01-13 ENCOUNTER — APPOINTMENT (OUTPATIENT)
Dept: WOUND CARE | Facility: HOSPITAL | Age: 55
End: 2020-01-13
Payer: MEDICARE

## 2020-01-13 PROCEDURE — 29445 APPL RIGID TOT CNTC LEG CAST: CPT

## 2020-01-17 ENCOUNTER — APPOINTMENT (OUTPATIENT)
Dept: WOUND CARE | Facility: HOSPITAL | Age: 55
End: 2020-01-17
Payer: MEDICARE

## 2020-01-17 PROCEDURE — 29445 APPL RIGID TOT CNTC LEG CAST: CPT

## 2020-01-20 ENCOUNTER — EVALUATION (OUTPATIENT)
Dept: PHYSICAL THERAPY | Facility: REHABILITATION | Age: 55
End: 2020-01-20
Payer: MEDICARE

## 2020-01-20 DIAGNOSIS — G89.29 CHRONIC PAIN OF BOTH KNEES: Primary | ICD-10-CM

## 2020-01-20 DIAGNOSIS — M54.40 LOW BACK PAIN WITH SCIATICA, SCIATICA LATERALITY UNSPECIFIED, UNSPECIFIED BACK PAIN LATERALITY, UNSPECIFIED CHRONICITY: ICD-10-CM

## 2020-01-20 DIAGNOSIS — M54.16 LUMBAR RADICULOPATHY: ICD-10-CM

## 2020-01-20 DIAGNOSIS — M25.561 CHRONIC PAIN OF BOTH KNEES: Primary | ICD-10-CM

## 2020-01-20 DIAGNOSIS — M25.562 CHRONIC PAIN OF BOTH KNEES: Primary | ICD-10-CM

## 2020-01-20 PROCEDURE — 97162 PT EVAL MOD COMPLEX 30 MIN: CPT | Performed by: PHYSICAL THERAPIST

## 2020-01-20 PROCEDURE — 97110 THERAPEUTIC EXERCISES: CPT | Performed by: PHYSICAL THERAPIST
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
## 2020-01-22 ENCOUNTER — APPOINTMENT (OUTPATIENT)
Dept: WOUND CARE | Facility: HOSPITAL | Age: 55
End: 2020-01-22
Payer: MEDICARE

## 2020-01-22 PROCEDURE — 99211 OFF/OP EST MAY X REQ PHY/QHP: CPT

## 2020-01-27 ENCOUNTER — OFFICE VISIT (OUTPATIENT)
Dept: PHYSICAL THERAPY | Facility: REHABILITATION | Age: 55
End: 2020-01-27
Payer: MEDICARE

## 2020-01-27 DIAGNOSIS — M25.562 CHRONIC PAIN OF BOTH KNEES: ICD-10-CM

## 2020-01-27 DIAGNOSIS — G89.29 CHRONIC PAIN OF BOTH KNEES: ICD-10-CM

## 2020-01-27 DIAGNOSIS — M25.561 CHRONIC PAIN OF BOTH KNEES: ICD-10-CM

## 2020-01-27 DIAGNOSIS — M54.40 LOW BACK PAIN WITH SCIATICA, SCIATICA LATERALITY UNSPECIFIED, UNSPECIFIED BACK PAIN LATERALITY, UNSPECIFIED CHRONICITY: Primary | ICD-10-CM

## 2020-01-27 DIAGNOSIS — M54.16 LUMBAR RADICULOPATHY: ICD-10-CM

## 2020-01-27 PROCEDURE — 97110 THERAPEUTIC EXERCISES: CPT | Performed by: PHYSICAL THERAPIST

## 2020-01-27 PROCEDURE — 97112 NEUROMUSCULAR REEDUCATION: CPT | Performed by: PHYSICAL THERAPIST

## 2020-01-27 PROCEDURE — 97140 MANUAL THERAPY 1/> REGIONS: CPT | Performed by: PHYSICAL THERAPIST

## 2020-01-27 NOTE — PROGRESS NOTES
Daily Note     Today's date: 2020  Patient name: Neda Pump  : 1965  MRN: [id number]  Referring provider: David Vinson DO   Dx:   Encounter Diagnosis     ICD-10-CM    1  Low back pain with sciatica, sciatica laterality unspecified, unspecified back pain laterality, unspecified chronicity M54 40    2  Lumbar radiculopathy M54 16    3  Chronic pain of both knees M25 561     M25 562     G89 29                   Subjective: Patient reports that she has been compliant with her HEP  She has not had any overall improvement in her back, and she reports that today when she woke up her back hurts more than usual   She had her wound healing cast removed at the end of last week, and presents today wearing running shoes  She notes that she was unable to attend her PT tx last week due to a "difficult day at dialysis"  Objective: See treatment diary below  Assessment: Patient had an increase in symptoms with Nu-step warm up  She had an improvement in back pain with PBall rollouts and lumbar rotational mob, which should be performed B/L next visit  She reported an increase in symptoms with supine TrA marches  She noted a small increase with bridges  She was educated to perform bridges with her HEP to begin some hip activation and improve tolerance for exercise  Patient would benefit from continued PT      Plan: Continue per plan of care        Precautions: CKD, obesity      Manual             Lumbar rotation  Gr  II->III TB                                                                   Exercise Diary             Seated clamshells 20x5s otb 20x5s otb           PBall rollouts 10x ea 10x ea           TrA activation 20x5s            Supine TrA marches  15x3s ea           Bridges  15x3s           Prone on elbows  3 min Modalities

## 2020-01-30 ENCOUNTER — APPOINTMENT (OUTPATIENT)
Dept: PHYSICAL THERAPY | Facility: REHABILITATION | Age: 55
End: 2020-01-30
Payer: MEDICARE

## 2020-01-31 ENCOUNTER — APPOINTMENT (OUTPATIENT)
Dept: WOUND CARE | Facility: HOSPITAL | Age: 55
End: 2020-01-31
Payer: MEDICARE

## 2020-01-31 ENCOUNTER — PROCEDURE VISIT (OUTPATIENT)
Dept: PODIATRY | Facility: CLINIC | Age: 55
End: 2020-01-31

## 2020-01-31 DIAGNOSIS — L85.3 XEROSIS CUTIS: Primary | ICD-10-CM

## 2020-01-31 DIAGNOSIS — E11.42 DIABETIC POLYNEUROPATHY ASSOCIATED WITH TYPE 2 DIABETES MELLITUS (HCC): Primary | ICD-10-CM

## 2020-01-31 PROCEDURE — 99212 OFFICE O/P EST SF 10 MIN: CPT

## 2020-01-31 RX ORDER — UREA/ALLANTOIN/VIT E ACETATE 25 %
CREAM (GRAM) TOPICAL 2 TIMES DAILY
Qty: 56 G | Refills: 10 | Status: SHIPPED | OUTPATIENT
Start: 2020-01-31 | End: 2020-03-11

## 2020-02-03 ENCOUNTER — APPOINTMENT (OUTPATIENT)
Dept: PHYSICAL THERAPY | Facility: REHABILITATION | Age: 55
End: 2020-02-03
Payer: MEDICARE

## 2020-02-05 ENCOUNTER — APPOINTMENT (OUTPATIENT)
Dept: PHYSICAL THERAPY | Facility: REHABILITATION | Age: 55
End: 2020-02-05
Payer: MEDICARE

## 2020-02-06 ENCOUNTER — APPOINTMENT (OUTPATIENT)
Dept: PHYSICAL THERAPY | Facility: REHABILITATION | Age: 55
End: 2020-02-06
Payer: MEDICARE

## 2020-02-10 ENCOUNTER — OFFICE VISIT (OUTPATIENT)
Dept: PHYSICAL THERAPY | Facility: REHABILITATION | Age: 55
End: 2020-02-10
Payer: MEDICARE

## 2020-02-10 DIAGNOSIS — G89.29 CHRONIC PAIN OF BOTH KNEES: ICD-10-CM

## 2020-02-10 DIAGNOSIS — M25.561 CHRONIC PAIN OF BOTH KNEES: ICD-10-CM

## 2020-02-10 DIAGNOSIS — M54.16 LUMBAR RADICULOPATHY: Primary | ICD-10-CM

## 2020-02-10 DIAGNOSIS — M54.40 LOW BACK PAIN WITH SCIATICA, SCIATICA LATERALITY UNSPECIFIED, UNSPECIFIED BACK PAIN LATERALITY, UNSPECIFIED CHRONICITY: ICD-10-CM

## 2020-02-10 DIAGNOSIS — M25.562 CHRONIC PAIN OF BOTH KNEES: ICD-10-CM

## 2020-02-10 PROCEDURE — 97112 NEUROMUSCULAR REEDUCATION: CPT | Performed by: PHYSICAL THERAPIST

## 2020-02-10 PROCEDURE — 97110 THERAPEUTIC EXERCISES: CPT | Performed by: PHYSICAL THERAPIST

## 2020-02-10 PROCEDURE — 97140 MANUAL THERAPY 1/> REGIONS: CPT | Performed by: PHYSICAL THERAPIST

## 2020-02-10 NOTE — PROGRESS NOTES
PT Discharge     Today's date: 2/10/2020  Patient name: Yamileth Coronado  : 1965  MRN: [id number]  Referring provider: Jen Dill DO   Dx:   Encounter Diagnosis     ICD-10-CM    1  Lumbar radiculopathy M54 16    2  Chronic pain of both knees M25 561     M25 562     G89 29    3  Low back pain with sciatica, sciatica laterality unspecified, unspecified back pain laterality, unspecified chronicity M54 40                 Patient is discharged due to violation of company attendance policy, she did not show up for 3 appointments without calling  Patient was called and a message was left on the answering machine  Subjective: Patient reports that her pain levels today are worse than usual   Her past couple of days have been a little worse  She notes that she was unable to attend her PT tx last week due to a "difficult day at dialysis"  Objective: See treatment diary below  Assessment: Patient noted no change in symptoms with lumbar rotational mob today  She was able to transfer into supine on the plinth without issue  She should continue to progress with lumbar mobility and neuromuscular control exercises  Patient would benefit from continued PT      Plan: Continue per plan of care        Precautions: CKD, , dialysis, obesity      Manual   2/10          Lumbar rotation  Gr  II->III TB Gr  III B/L                                                                  Exercise Diary   2/10          Seated clamshells 20x5s otb 20x5s otb           PBall rollouts 10x ea 10x ea 10x ea          TrA activation 20x5s  10x5s           Supine TrA marches  15x3s ea           Bridges  15x3s 10x3s          Prone on elbows  3 min             LTR   15x ea          Supine ball crush   20x5s          Supine clamshells   20x5s otb                                                                                                                                                             Modalities

## 2020-02-12 ENCOUNTER — APPOINTMENT (OUTPATIENT)
Dept: PHYSICAL THERAPY | Facility: REHABILITATION | Age: 55
End: 2020-02-12
Payer: MEDICARE

## 2020-02-13 ENCOUNTER — APPOINTMENT (OUTPATIENT)
Dept: PHYSICAL THERAPY | Facility: REHABILITATION | Age: 55
End: 2020-02-13
Payer: MEDICARE

## 2020-02-19 ENCOUNTER — APPOINTMENT (OUTPATIENT)
Dept: PHYSICAL THERAPY | Facility: REHABILITATION | Age: 55
End: 2020-02-19
Payer: MEDICARE

## 2020-02-20 ENCOUNTER — APPOINTMENT (OUTPATIENT)
Dept: RADIOLOGY | Facility: HOSPITAL | Age: 55
End: 2020-02-20
Payer: MEDICARE

## 2020-02-20 ENCOUNTER — APPOINTMENT (EMERGENCY)
Dept: RADIOLOGY | Facility: HOSPITAL | Age: 55
End: 2020-02-20
Payer: MEDICARE

## 2020-02-20 ENCOUNTER — APPOINTMENT (OUTPATIENT)
Dept: PHYSICAL THERAPY | Facility: REHABILITATION | Age: 55
End: 2020-02-20
Payer: MEDICARE

## 2020-02-20 ENCOUNTER — HOSPITAL ENCOUNTER (OUTPATIENT)
Facility: HOSPITAL | Age: 55
Setting detail: OBSERVATION
Discharge: HOME | End: 2020-02-21
Attending: EMERGENCY MEDICINE | Admitting: INTERNAL MEDICINE
Payer: MEDICARE

## 2020-02-20 ENCOUNTER — APPOINTMENT (EMERGENCY)
Dept: DIALYSIS | Facility: HOSPITAL | Age: 55
End: 2020-02-20
Attending: INTERNAL MEDICINE
Payer: MEDICARE

## 2020-02-20 DIAGNOSIS — Z86.718 HISTORY OF DVT (DEEP VEIN THROMBOSIS): ICD-10-CM

## 2020-02-20 DIAGNOSIS — R07.89 CHEST WALL PAIN: ICD-10-CM

## 2020-02-20 DIAGNOSIS — J18.9 LINGULAR PNEUMONIA: Primary | ICD-10-CM

## 2020-02-20 DIAGNOSIS — M25.561 CHRONIC PAIN OF BOTH KNEES: ICD-10-CM

## 2020-02-20 DIAGNOSIS — L97.509 DIABETIC FOOT ULCER ASSOCIATED WITH DIABETES MELLITUS DUE TO UNDERLYING CONDITION, UNSPECIFIED LATERALITY, UNSPECIFIED PART OF FOOT, UNSPECIFIED ULCER STAGE (HCC): ICD-10-CM

## 2020-02-20 DIAGNOSIS — R07.89 ATYPICAL CHEST PAIN: ICD-10-CM

## 2020-02-20 DIAGNOSIS — R68.89 FLU-LIKE SYMPTOMS: ICD-10-CM

## 2020-02-20 DIAGNOSIS — G89.29 CHRONIC PAIN OF BOTH KNEES: ICD-10-CM

## 2020-02-20 DIAGNOSIS — R26.2 AMBULATORY DYSFUNCTION: ICD-10-CM

## 2020-02-20 DIAGNOSIS — M25.562 CHRONIC PAIN OF BOTH KNEES: ICD-10-CM

## 2020-02-20 DIAGNOSIS — R06.00 DYSPNEA: ICD-10-CM

## 2020-02-20 DIAGNOSIS — E08.621 DIABETIC FOOT ULCER ASSOCIATED WITH DIABETES MELLITUS DUE TO UNDERLYING CONDITION, UNSPECIFIED LATERALITY, UNSPECIFIED PART OF FOOT, UNSPECIFIED ULCER STAGE (HCC): ICD-10-CM

## 2020-02-20 DIAGNOSIS — K21.9 GASTROESOPHAGEAL REFLUX DISEASE: ICD-10-CM

## 2020-02-20 LAB
ALBUMIN SERPL BCP-MCNC: 2.6 G/DL (ref 3.5–5)
ALP SERPL-CCNC: 120 U/L (ref 46–116)
ALT SERPL W P-5'-P-CCNC: 19 U/L (ref 12–78)
ANION GAP SERPL CALCULATED.3IONS-SCNC: 11 MMOL/L (ref 4–13)
AST SERPL W P-5'-P-CCNC: 31 U/L (ref 5–45)
ATRIAL RATE: 67 BPM
BASOPHILS # BLD AUTO: 0.04 THOUSANDS/ΜL (ref 0–0.1)
BASOPHILS NFR BLD AUTO: 1 % (ref 0–1)
BILIRUB SERPL-MCNC: 0.62 MG/DL (ref 0.2–1)
BUN SERPL-MCNC: 60 MG/DL (ref 5–25)
CALCIUM SERPL-MCNC: 8.6 MG/DL (ref 8.3–10.1)
CHLORIDE SERPL-SCNC: 100 MMOL/L (ref 100–108)
CO2 SERPL-SCNC: 22 MMOL/L (ref 21–32)
CREAT SERPL-MCNC: 8.54 MG/DL (ref 0.6–1.3)
EOSINOPHIL # BLD AUTO: 0.35 THOUSAND/ΜL (ref 0–0.61)
EOSINOPHIL NFR BLD AUTO: 4 % (ref 0–6)
ERYTHROCYTE [DISTWIDTH] IN BLOOD BY AUTOMATED COUNT: 13.7 % (ref 11.6–15.1)
EST. AVERAGE GLUCOSE BLD GHB EST-MCNC: 200 MG/DL
FLUAV RNA NPH QL NAA+PROBE: NORMAL
FLUBV RNA NPH QL NAA+PROBE: NORMAL
GFR SERPL CREATININE-BSD FRML MDRD: 5 ML/MIN/1.73SQ M
GLUCOSE SERPL-MCNC: 355 MG/DL (ref 65–140)
GLUCOSE SERPL-MCNC: 401 MG/DL (ref 65–140)
GLUCOSE SERPL-MCNC: 99 MG/DL (ref 65–140)
HBA1C MFR BLD: 8.6 %
HCT VFR BLD AUTO: 30.1 % (ref 34.8–46.1)
HGB BLD-MCNC: 9.7 G/DL (ref 11.5–15.4)
IMM GRANULOCYTES # BLD AUTO: 0.05 THOUSAND/UL (ref 0–0.2)
IMM GRANULOCYTES NFR BLD AUTO: 1 % (ref 0–2)
INR PPP: 1.37 (ref 0.84–1.19)
LYMPHOCYTES # BLD AUTO: 1.16 THOUSANDS/ΜL (ref 0.6–4.47)
LYMPHOCYTES NFR BLD AUTO: 13 % (ref 14–44)
MCH RBC QN AUTO: 31.1 PG (ref 26.8–34.3)
MCHC RBC AUTO-ENTMCNC: 32.2 G/DL (ref 31.4–37.4)
MCV RBC AUTO: 97 FL (ref 82–98)
MONOCYTES # BLD AUTO: 0.68 THOUSAND/ΜL (ref 0.17–1.22)
MONOCYTES NFR BLD AUTO: 8 % (ref 4–12)
NEUTROPHILS # BLD AUTO: 6.55 THOUSANDS/ΜL (ref 1.85–7.62)
NEUTS SEG NFR BLD AUTO: 73 % (ref 43–75)
NRBC BLD AUTO-RTO: 0 /100 WBCS
P AXIS: 32 DEGREES
PLATELET # BLD AUTO: 198 THOUSANDS/UL (ref 149–390)
PMV BLD AUTO: 10.8 FL (ref 8.9–12.7)
POTASSIUM SERPL-SCNC: 6 MMOL/L (ref 3.5–5.3)
PR INTERVAL: 182 MS
PROCALCITONIN SERPL-MCNC: 0.19 NG/ML
PROT SERPL-MCNC: 7 G/DL (ref 6.4–8.2)
PROTHROMBIN TIME: 16.4 SECONDS (ref 11.6–14.5)
QRS AXIS: 37 DEGREES
QRSD INTERVAL: 92 MS
QT INTERVAL: 412 MS
QTC INTERVAL: 435 MS
RBC # BLD AUTO: 3.12 MILLION/UL (ref 3.81–5.12)
RSV RNA NPH QL NAA+PROBE: NORMAL
SODIUM SERPL-SCNC: 133 MMOL/L (ref 136–145)
T WAVE AXIS: 79 DEGREES
TROPONIN I SERPL-MCNC: <0.02 NG/ML
VENTRICULAR RATE: 67 BPM
WBC # BLD AUTO: 8.83 THOUSAND/UL (ref 4.31–10.16)

## 2020-02-20 PROCEDURE — 76937 US GUIDE VASCULAR ACCESS: CPT | Performed by: EMERGENCY MEDICINE

## 2020-02-20 PROCEDURE — 83036 HEMOGLOBIN GLYCOSYLATED A1C: CPT | Performed by: INTERNAL MEDICINE

## 2020-02-20 PROCEDURE — 85610 PROTHROMBIN TIME: CPT | Performed by: INTERNAL MEDICINE

## 2020-02-20 PROCEDURE — 99285 EMERGENCY DEPT VISIT HI MDM: CPT

## 2020-02-20 PROCEDURE — 99220 PR INITIAL OBSERVATION CARE/DAY 70 MINUTES: CPT | Performed by: INTERNAL MEDICINE

## 2020-02-20 PROCEDURE — 84484 ASSAY OF TROPONIN QUANT: CPT | Performed by: EMERGENCY MEDICINE

## 2020-02-20 PROCEDURE — 87631 RESP VIRUS 3-5 TARGETS: CPT | Performed by: INTERNAL MEDICINE

## 2020-02-20 PROCEDURE — 99285 EMERGENCY DEPT VISIT HI MDM: CPT | Performed by: EMERGENCY MEDICINE

## 2020-02-20 PROCEDURE — 99215 OFFICE O/P EST HI 40 MIN: CPT | Performed by: INTERNAL MEDICINE

## 2020-02-20 PROCEDURE — 80053 COMPREHEN METABOLIC PANEL: CPT | Performed by: EMERGENCY MEDICINE

## 2020-02-20 PROCEDURE — 71046 X-RAY EXAM CHEST 2 VIEWS: CPT

## 2020-02-20 PROCEDURE — 84145 PROCALCITONIN (PCT): CPT | Performed by: EMERGENCY MEDICINE

## 2020-02-20 PROCEDURE — 82948 REAGENT STRIP/BLOOD GLUCOSE: CPT

## 2020-02-20 PROCEDURE — 96374 THER/PROPH/DIAG INJ IV PUSH: CPT

## 2020-02-20 PROCEDURE — 36415 COLL VENOUS BLD VENIPUNCTURE: CPT | Performed by: EMERGENCY MEDICINE

## 2020-02-20 PROCEDURE — 85025 COMPLETE CBC W/AUTO DIFF WBC: CPT | Performed by: EMERGENCY MEDICINE

## 2020-02-20 PROCEDURE — 71250 CT THORAX DX C-: CPT

## 2020-02-20 PROCEDURE — 36556 INSERT NON-TUNNEL CV CATH: CPT | Performed by: EMERGENCY MEDICINE

## 2020-02-20 PROCEDURE — 94760 N-INVAS EAR/PLS OXIMETRY 1: CPT

## 2020-02-20 PROCEDURE — 94640 AIRWAY INHALATION TREATMENT: CPT

## 2020-02-20 PROCEDURE — 93010 ELECTROCARDIOGRAM REPORT: CPT | Performed by: INTERNAL MEDICINE

## 2020-02-20 PROCEDURE — 93005 ELECTROCARDIOGRAM TRACING: CPT

## 2020-02-20 PROCEDURE — G0257 UNSCHED DIALYSIS ESRD PT HOS: HCPCS

## 2020-02-20 RX ORDER — GUAIFENESIN 600 MG
600 TABLET, EXTENDED RELEASE 12 HR ORAL EVERY 12 HOURS SCHEDULED
Status: DISCONTINUED | OUTPATIENT
Start: 2020-02-20 | End: 2020-02-21 | Stop reason: HOSPADM

## 2020-02-20 RX ORDER — ALPRAZOLAM 0.25 MG/1
0.25 TABLET ORAL 2 TIMES DAILY PRN
Status: DISCONTINUED | OUTPATIENT
Start: 2020-02-20 | End: 2020-02-21 | Stop reason: HOSPADM

## 2020-02-20 RX ORDER — GLIMEPIRIDE 2 MG/1
1 TABLET ORAL 2 TIMES DAILY
Status: DISCONTINUED | OUTPATIENT
Start: 2020-02-20 | End: 2020-02-20

## 2020-02-20 RX ORDER — AMOXICILLIN 500 MG/1
1000 CAPSULE ORAL EVERY 8 HOURS SCHEDULED
Qty: 42 CAPSULE | Refills: 0 | Status: SHIPPED | OUTPATIENT
Start: 2020-02-20 | End: 2020-02-21

## 2020-02-20 RX ORDER — SODIUM CHLORIDE FOR INHALATION 0.9 %
3 VIAL, NEBULIZER (ML) INHALATION
Status: DISCONTINUED | OUTPATIENT
Start: 2020-02-20 | End: 2020-02-21 | Stop reason: HOSPADM

## 2020-02-20 RX ORDER — GABAPENTIN 300 MG/1
300 CAPSULE ORAL
Status: DISCONTINUED | OUTPATIENT
Start: 2020-02-20 | End: 2020-02-21 | Stop reason: HOSPADM

## 2020-02-20 RX ORDER — LEVALBUTEROL 1.25 MG/.5ML
1.25 SOLUTION, CONCENTRATE RESPIRATORY (INHALATION) EVERY 8 HOURS PRN
Status: DISCONTINUED | OUTPATIENT
Start: 2020-02-20 | End: 2020-02-20

## 2020-02-20 RX ORDER — POLYETHYLENE GLYCOL 3350 17 G/17G
17 POWDER, FOR SOLUTION ORAL DAILY
Status: DISCONTINUED | OUTPATIENT
Start: 2020-02-21 | End: 2020-02-21 | Stop reason: HOSPADM

## 2020-02-20 RX ORDER — CARVEDILOL 25 MG/1
25 TABLET ORAL 2 TIMES DAILY WITH MEALS
Status: DISCONTINUED | OUTPATIENT
Start: 2020-02-20 | End: 2020-02-21 | Stop reason: HOSPADM

## 2020-02-20 RX ORDER — AMOXICILLIN 250 MG/1
1000 CAPSULE ORAL EVERY 8 HOURS SCHEDULED
Status: DISCONTINUED | OUTPATIENT
Start: 2020-02-20 | End: 2020-02-20

## 2020-02-20 RX ORDER — DORZOLAMIDE HYDROCHLORIDE AND TIMOLOL MALEATE 20; 5 MG/ML; MG/ML
1 SOLUTION/ DROPS OPHTHALMIC 2 TIMES DAILY
Status: DISCONTINUED | OUTPATIENT
Start: 2020-02-20 | End: 2020-02-21 | Stop reason: HOSPADM

## 2020-02-20 RX ORDER — PANTOPRAZOLE SODIUM 40 MG/1
40 TABLET, DELAYED RELEASE ORAL
Status: DISCONTINUED | OUTPATIENT
Start: 2020-02-21 | End: 2020-02-21 | Stop reason: HOSPADM

## 2020-02-20 RX ORDER — CINACALCET 30 MG/1
30 TABLET, FILM COATED ORAL DAILY
Status: DISCONTINUED | OUTPATIENT
Start: 2020-02-21 | End: 2020-02-21 | Stop reason: HOSPADM

## 2020-02-20 RX ORDER — AZITHROMYCIN 250 MG/1
TABLET, FILM COATED ORAL
Qty: 6 TABLET | Refills: 0 | Status: SHIPPED | OUTPATIENT
Start: 2020-02-20 | End: 2020-02-21

## 2020-02-20 RX ORDER — LIDOCAINE 50 MG/G
1 PATCH TOPICAL ONCE
Status: COMPLETED | OUTPATIENT
Start: 2020-02-20 | End: 2020-02-20

## 2020-02-20 RX ORDER — LATANOPROST 50 UG/ML
1 SOLUTION/ DROPS OPHTHALMIC
Status: DISCONTINUED | OUTPATIENT
Start: 2020-02-20 | End: 2020-02-21 | Stop reason: HOSPADM

## 2020-02-20 RX ORDER — CHOLECALCIFEROL (VITAMIN D3) 10 MCG
1 TABLET ORAL DAILY
Status: DISCONTINUED | OUTPATIENT
Start: 2020-02-21 | End: 2020-02-21 | Stop reason: HOSPADM

## 2020-02-20 RX ORDER — ONDANSETRON 2 MG/ML
4 INJECTION INTRAMUSCULAR; INTRAVENOUS EVERY 6 HOURS PRN
Status: DISCONTINUED | OUTPATIENT
Start: 2020-02-20 | End: 2020-02-21 | Stop reason: HOSPADM

## 2020-02-20 RX ORDER — INSULIN GLARGINE 100 [IU]/ML
30 INJECTION, SOLUTION SUBCUTANEOUS
Status: DISCONTINUED | OUTPATIENT
Start: 2020-02-20 | End: 2020-02-21 | Stop reason: HOSPADM

## 2020-02-20 RX ORDER — LORATADINE 10 MG/1
10 TABLET ORAL DAILY
Status: DISCONTINUED | OUTPATIENT
Start: 2020-02-21 | End: 2020-02-21 | Stop reason: HOSPADM

## 2020-02-20 RX ORDER — BENZONATATE 100 MG/1
200 CAPSULE ORAL 3 TIMES DAILY
Status: DISCONTINUED | OUTPATIENT
Start: 2020-02-20 | End: 2020-02-21 | Stop reason: HOSPADM

## 2020-02-20 RX ORDER — AMOXICILLIN 250 MG/1
1000 CAPSULE ORAL ONCE
Status: DISCONTINUED | OUTPATIENT
Start: 2020-02-20 | End: 2020-02-20

## 2020-02-20 RX ORDER — LIDOCAINE 40 MG/G
CREAM TOPICAL
Status: DISCONTINUED | OUTPATIENT
Start: 2020-02-20 | End: 2020-02-21 | Stop reason: HOSPADM

## 2020-02-20 RX ORDER — CALCIUM CARBONATE 1250 MG/5ML
1250 SUSPENSION ORAL 2 TIMES DAILY WITH MEALS
Status: DISCONTINUED | OUTPATIENT
Start: 2020-02-20 | End: 2020-02-21 | Stop reason: HOSPADM

## 2020-02-20 RX ORDER — LEVALBUTEROL 1.25 MG/.5ML
1.25 SOLUTION, CONCENTRATE RESPIRATORY (INHALATION)
Status: DISCONTINUED | OUTPATIENT
Start: 2020-02-20 | End: 2020-02-21 | Stop reason: HOSPADM

## 2020-02-20 RX ORDER — METHAZOLAMIDE 50 MG/1
25 TABLET ORAL 3 TIMES DAILY
Status: DISCONTINUED | OUTPATIENT
Start: 2020-02-20 | End: 2020-02-21 | Stop reason: HOSPADM

## 2020-02-20 RX ORDER — LOSARTAN POTASSIUM 25 MG/1
25 TABLET ORAL DAILY
Status: DISCONTINUED | OUTPATIENT
Start: 2020-02-21 | End: 2020-02-21 | Stop reason: HOSPADM

## 2020-02-20 RX ORDER — WARFARIN SODIUM 5 MG/1
10 TABLET ORAL
Status: DISCONTINUED | OUTPATIENT
Start: 2020-02-20 | End: 2020-02-21 | Stop reason: HOSPADM

## 2020-02-20 RX ORDER — BRIMONIDINE TARTRATE 0.15 %
1 DROPS OPHTHALMIC (EYE) 2 TIMES DAILY
Status: DISCONTINUED | OUTPATIENT
Start: 2020-02-20 | End: 2020-02-21 | Stop reason: HOSPADM

## 2020-02-20 RX ORDER — UREA/ALLANTOIN/VIT E ACETATE 25 %
CREAM (GRAM) TOPICAL 2 TIMES DAILY
Status: DISCONTINUED | OUTPATIENT
Start: 2020-02-20 | End: 2020-02-20

## 2020-02-20 RX ORDER — LEVOTHYROXINE SODIUM 0.05 MG/1
50 TABLET ORAL
Status: DISCONTINUED | OUTPATIENT
Start: 2020-02-21 | End: 2020-02-21 | Stop reason: HOSPADM

## 2020-02-20 RX ORDER — OXYCODONE HYDROCHLORIDE AND ACETAMINOPHEN 5; 325 MG/1; MG/1
1 TABLET ORAL EVERY 4 HOURS PRN
COMMUNITY
End: 2020-05-15

## 2020-02-20 RX ORDER — DOCUSATE SODIUM 100 MG/1
100 CAPSULE, LIQUID FILLED ORAL 2 TIMES DAILY
Status: DISCONTINUED | OUTPATIENT
Start: 2020-02-20 | End: 2020-02-21 | Stop reason: HOSPADM

## 2020-02-20 RX ORDER — NYSTATIN 100000 [USP'U]/G
POWDER TOPICAL 2 TIMES DAILY
Status: DISCONTINUED | OUTPATIENT
Start: 2020-02-20 | End: 2020-02-21 | Stop reason: HOSPADM

## 2020-02-20 RX ORDER — ACETAMINOPHEN 325 MG/1
650 TABLET ORAL EVERY 6 HOURS PRN
Qty: 30 TABLET | Refills: 0 | Status: SHIPPED | OUTPATIENT
Start: 2020-02-20 | End: 2022-07-14

## 2020-02-20 RX ORDER — ACETAMINOPHEN 325 MG/1
650 TABLET ORAL EVERY 6 HOURS PRN
Status: DISCONTINUED | OUTPATIENT
Start: 2020-02-20 | End: 2020-02-21 | Stop reason: HOSPADM

## 2020-02-20 RX ORDER — DOXERCALCIFEROL 2 UG/ML
1 INJECTION, SOLUTION INTRAVENOUS AS NEEDED
Status: DISCONTINUED | OUTPATIENT
Start: 2020-02-20 | End: 2020-02-21 | Stop reason: HOSPADM

## 2020-02-20 RX ORDER — TRAMADOL HYDROCHLORIDE 50 MG/1
50 TABLET ORAL EVERY 8 HOURS PRN
Status: DISCONTINUED | OUTPATIENT
Start: 2020-02-20 | End: 2020-02-21 | Stop reason: HOSPADM

## 2020-02-20 RX ORDER — ACETAMINOPHEN 325 MG/1
975 TABLET ORAL ONCE
Status: COMPLETED | OUTPATIENT
Start: 2020-02-20 | End: 2020-02-20

## 2020-02-20 RX ORDER — TORSEMIDE 20 MG/1
200 TABLET ORAL EVERY 12 HOURS
Status: DISCONTINUED | OUTPATIENT
Start: 2020-02-20 | End: 2020-02-21 | Stop reason: HOSPADM

## 2020-02-20 RX ORDER — GABAPENTIN 100 MG/1
100 CAPSULE ORAL 3 TIMES DAILY
Status: DISCONTINUED | OUTPATIENT
Start: 2020-02-20 | End: 2020-02-20

## 2020-02-20 RX ORDER — LANOLIN ALCOHOL/MO/W.PET/CERES
3 CREAM (GRAM) TOPICAL
Status: DISCONTINUED | OUTPATIENT
Start: 2020-02-20 | End: 2020-02-21 | Stop reason: HOSPADM

## 2020-02-20 RX ORDER — PREDNISOLONE ACETATE 10 MG/ML
1 SUSPENSION/ DROPS OPHTHALMIC 4 TIMES DAILY
Status: DISCONTINUED | OUTPATIENT
Start: 2020-02-20 | End: 2020-02-21 | Stop reason: HOSPADM

## 2020-02-20 RX ORDER — ATORVASTATIN CALCIUM 20 MG/1
20 TABLET, FILM COATED ORAL EVERY EVENING
Status: DISCONTINUED | OUTPATIENT
Start: 2020-02-20 | End: 2020-02-21 | Stop reason: HOSPADM

## 2020-02-20 RX ADMIN — MELATONIN 3 MG: 3 TAB ORAL at 21:57

## 2020-02-20 RX ADMIN — DOXERCALCIFEROL 1 MCG: 4 INJECTION, SOLUTION INTRAVENOUS at 11:04

## 2020-02-20 RX ADMIN — GABAPENTIN 300 MG: 300 CAPSULE ORAL at 21:57

## 2020-02-20 RX ADMIN — TORSEMIDE 200 MG: 20 TABLET ORAL at 19:56

## 2020-02-20 RX ADMIN — INSULIN GLARGINE 30 UNITS: 100 INJECTION, SOLUTION SUBCUTANEOUS at 21:58

## 2020-02-20 RX ADMIN — CARVEDILOL 25 MG: 25 TABLET, FILM COATED ORAL at 19:55

## 2020-02-20 RX ADMIN — BENZONATATE 200 MG: 100 CAPSULE ORAL at 19:55

## 2020-02-20 RX ADMIN — DOCUSATE SODIUM 100 MG: 100 CAPSULE, LIQUID FILLED ORAL at 19:55

## 2020-02-20 RX ADMIN — ISODIUM CHLORIDE 3 ML: 0.03 SOLUTION RESPIRATORY (INHALATION) at 19:12

## 2020-02-20 RX ADMIN — LATANOPROST 1 DROP: 50 SOLUTION OPHTHALMIC at 21:58

## 2020-02-20 RX ADMIN — GUAIFENESIN 600 MG: 600 TABLET, EXTENDED RELEASE ORAL at 21:57

## 2020-02-20 RX ADMIN — CALCIUM CARBONATE 1250 MG: 1250 SUSPENSION ORAL at 21:58

## 2020-02-20 RX ADMIN — TRAMADOL HYDROCHLORIDE 50 MG: 50 TABLET, FILM COATED ORAL at 23:59

## 2020-02-20 RX ADMIN — ACETAMINOPHEN 975 MG: 325 TABLET ORAL at 10:02

## 2020-02-20 RX ADMIN — LEVALBUTEROL HYDROCHLORIDE 1.25 MG: 1.25 SOLUTION, CONCENTRATE RESPIRATORY (INHALATION) at 19:12

## 2020-02-20 RX ADMIN — LIDOCAINE 1 PATCH: 50 PATCH TOPICAL at 10:02

## 2020-02-20 RX ADMIN — WARFARIN SODIUM 10 MG: 5 TABLET ORAL at 21:57

## 2020-02-20 RX ADMIN — ATORVASTATIN CALCIUM 20 MG: 20 TABLET, FILM COATED ORAL at 19:54

## 2020-02-20 RX ADMIN — AMOXICILLIN 1000 MG: 250 CAPSULE ORAL at 14:16

## 2020-02-20 NOTE — SEPSIS NOTE
Sepsis Note   Teresa Arteaga 48 y o  female MRN: [id number]  Unit/Bed#: [facility] Encounter: [id number]      qSOFA     Row Name 02/20/20 1425 02/20/20 1400 02/20/20 1330 02/20/20 1300 02/20/20 1230    Altered mental status GCS < 15  --  --  --  --  --    Respiratory Rate > / =22  0  0  0  0  0    Systolic BP < / =788  0  0  0  1  0    Q Sofa Score  0  0  0  1  0    Row Name 02/20/20 1200 02/20/20 1130 02/20/20 1100 02/20/20 1045 02/20/20 1040    Altered mental status GCS < 15  --  --  --  --  --    Respiratory Rate > / =22  0  0  0  0  0    Systolic BP < / =643  0  0  0  0  0    Q Sofa Score  0  0  0  0  0    Row Name 02/20/20 0847 02/20/20 0634             Altered mental status GCS < 15  --  --       Respiratory Rate > / =22  0  0       Systolic BP < / =244  0  0       Q Sofa Score  0  0           Initial Sepsis Screening     Row Name 02/20/20 1522                Is the patient's history suggestive of a new or worsening infection? (!) Yes (Proceed)  -VB        Suspected source of infection  pneumonia  -VB        Are two or more of the following signs & symptoms of infection both present and new to the patient? No  -VB        Indicate SIRS criteria  --        If the answer is yes to both questions, suspicion of sepsis is present  --        If severe sepsis is present AND tissue hypoperfusion perists in the hour after fluid resuscitation or lactate > 4, the patient meets criteria for SEPTIC SHOCK  --        Are any of the following organ dysfunction criteria present within 6 hours of suspected infection and SIRS criteria that are NOT considered to be chronic conditions? --        Organ dysfunction  --        Date of presentation of severe sepsis  --        Time of presentation of severe sepsis  --        Tissue hypoperfusion persists in the hour after crystalloid fluid administration, evidenced, by either:  --        Was hypotension present within one hour of the conclusion of crystalloid fluid administration?   -- Date of presentation of septic shock  --        Time of presentation of septic shock  --          User Key  (r) = Recorded By, (t) = Taken By, (c) = Cosigned By    [address] Name Provider Jevon Velasquez MD Physician

## 2020-02-20 NOTE — PLAN OF CARE
Chronic hemodialysis treatment ordered for 3 3/4 hours using 2 K+ bath for first 2 hours 45 minutes and 1 K+ bath last hour for serum potassium 6 this morning  Fluid goal 4500 ml per orders of Dr Iris Ayers    Problem: METABOLIC, FLUID AND ELECTROLYTES - ADULT  Goal: Electrolytes maintained within normal limits  Description  INTERVENTIONS:  - Monitor labs and assess patient for signs and symptoms of electrolyte imbalances  - Administer electrolyte replacement as ordered  - Monitor response to electrolyte replacements, including repeat lab results as appropriate  - Instruct patient on fluid and nutrition as appropriate  Outcome: Progressing  Goal: Fluid balance maintained  Description  INTERVENTIONS:  - Monitor labs   - Monitor I/O and WT  - Instruct patient on fluid and nutrition as appropriate  - Assess for signs & symptoms of volume excess or deficit  Outcome: Progressing

## 2020-02-20 NOTE — CONSULTS
Consultation    Nephrology   Noam Mike 48 y o  female MRN: [id number]  Unit/Bed#: [facility] Encounter: [id number]    History of Present Illness   Physician Requesting Consult: Michael Patino MD  Reason for Consult: -  dialysis management    ASSESSMENT/PLAN:   48 y o  female with pmh of obesity, diabetes, hypertension, anxiety and ESRD (TTS) who was admitted for chest wall pain and shortness of breath  Nephrology was consulted for assistance in the management of ESRD  ESRD:  - Gets usual dialysis Tuesday Thursday Saturday  - follows in [address]  - Will do HD today- orders in place   - Estimated dry weight = 122 kilos  - aiming for UF close to 4 kilos with dialysis today  - Access - left arm AV fistula  - Acid base and lytes stable except for mild hyperkalemia and hyponatremia with a slightly hemolyzed specimen   - Renal diet with allowed p o     H/H/anemia:   - maintain hemoglobin greater than 8 grams/deciliter  - most recent hemoglobin at 9 7 grams/deciliter  - Goal Hb 10-12gm/dL     Bone Mineral Disease/secondary hyperparathyroidism of renal origin:  - check ca, phos  - no Hectorol 1 mcg with dialysis  - follow-up PTH as an outpatient     BP/hypertension:  - stable, on Coreg 25 mg p o  B i d , losartan 25 mg p o  Q day, torsemide 100 mg p o  Q 12 as an outpatient     Volume status:  - Clinically is euvolemic minimally hypervolemic    - we for UF close to 4 kilos with dialysis today    hyponatremia:  - Stable likely due to hemolyzed specimen  - To be corrected with dialysis    hyperphosphatemia:  - Continue on low phos diet  - To be corrected with dialysis  - on calcium carbonate 1000 mg p o  Q day    DM:  - management as per Primary team  - on insulin    Other problems:  - chest pain:  Likely pleuritic, management per ED      Thanks for the consult  Will continue to follow  Please call with questions    Above-mentioned orders and Plan in terms of dialysis was discussed with the ED team in depth    Ulysses Harpin, MD, FASN, 2/20/2020, 11:44 AM          HISTORY OF PRESENT ILLNESS:   Anaya Ventura is a 48 y o  female with pmh of obesity, diabetes, hypertension, anxiety and ESRD (TTS) who was admitted for chest wall pain and shortness of breath  Nephrology was consulted for assistance in the management of ESRD  Anaya Ventura is a known ESRD patient who undergoes maintenance hemodialysis at [address] on Tuesday Thursday Saturday  Patient was initially seen in the ED reports she has been having chest wall pain worse with inspiration for last 2 days also had some issues with diarrhea and vomiting for last 2 days denies fevers or cough occasional chills  No sick contacts  Was supposed to go for next dialysis treatment yesterday for volume removal however did not show up to treatment when she showed up to the treatment this a m  With worsening symptoms of chest pain she was sent over to the ED for further evaluation  Currently feels well has no complaints  History obtained from chart review and the patient    Consults    Review of Systems   Constitutional: Positive for chills and fatigue  Negative for appetite change  HENT: Negative for congestion  Respiratory: Negative for cough, shortness of breath and wheezing  Cardiovascular: Positive for chest pain  Negative for leg swelling  Gastrointestinal: Negative for abdominal pain, constipation, diarrhea, nausea and vomiting  Genitourinary: Negative for flank pain  Musculoskeletal: Positive for back pain  Neurological: Negative for dizziness and headaches  Psychiatric/Behavioral: Negative for agitation and confusion  All other systems reviewed and are negative        Historical Information   Patient Active Problem List   Diagnosis    Benign hypertension with end-stage renal disease ([facility]    History of DVT (deep vein thrombosis)    Abnormal uterine bleeding    Glaucoma    ESRD on hemodialysis [facility]    Anxiety disorder    Type 2 diabetes mellitus ([facility]    Knee pain    Ambulatory dysfunction    Anemia in end-stage renal disease (HCC)    Hemodialysis status (HCC)    Primary osteoarthritis of right knee    Colon cancer screening    Gastroesophageal reflux disease    Meningioma (HCC)    Nausea & vomiting    Secondary hyperparathyroidism of renal origin ([facility]    Other chest pain    Hyperkalemia    Chronic anemia    Diabetic foot ulcer (HCC)    Hyponatremia    Parenchymal renal hypertension    Candidiasis of breast    Hemodialysis-associated hypotension    Lumbar radiculopathy    Low back pain with sciatica     Past Medical History:   Diagnosis Date    Abnormal uterine bleeding (AUB)     Anxiety     Diabetes mellitus ([facility]     Disease of thyroid gland     DVT (deep venous thrombosis) (HCC)     End stage kidney disease (HCC)     [facility] to secondary DM [facility]     Hypertension     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[place] [address] )     right eye    Morbid obesity ([facility])     Panic attacks     Reflux esophagitis     Thrombophlebitis of saphenous vein     Warfarin anticoagulation      Past Surgical History:   Procedure Laterality Date    ARTERIOVENOUS GRAFT PLACEMENT      CERVICAL BIOPSY  W/ LOOP ELECTRODE EXCISION       SECTION      DIALYSIS FISTULA CREATION      DILATION AND CURETTAGE OF UTERUS      ENDOMETRIAL ABLATION W/ NOVASURE      EYE SURGERY      HYSTERECTOMY      @ age 57 (complete)    IR FISTULA/GRAFTOGRAM  10/11/2019    OOPHORECTOMY Bilateral     @ age 57    PERICARDIUM SURGERY      [place] COLONOSCOPY FLX DX W/COLLJ SPEC WHEN PFRMD N/A 3/13/2019    Procedure: COLONOSCOPY;  Surgeon: Jackie Frey MD;  [facility]: [facility]; Service: Gastroenterology    NJ ESOPHAGOGASTRODUODENOSCOPY TRANSORAL DIAGNOSTIC N/A 3/13/2019    Procedure: ESOPHAGOGASTRODUODENOSCOPY (EGD); Surgeon: Jackie Frey MD;  [facility]: [facility];   Service: Gastroenterology    NJ LAPAROSCOPY W TOT HYSTERECT UTERUS 250 GRAM OR LESS N/A 2/16/2016    Procedure: HYSTERECTOMY LAPAROSCOPIC TOTAL ([address]), with bilateral salpingectomy;  Surgeon: Maida Fulton DO;  [facility];  Service: Gynecology    THROMBECTOMY / ARTERIOVENOUS GRAFT REVISION      TOE SURGERY Right     removal of the 4th toe     Social History   Social History     Substance and Sexual Activity   Alcohol Use Not Currently    Alcohol/week: 0 0 standard drinks    Frequency: Never    Drinks per session: Patient refused    Binge frequency: Patient refused     Social History     Substance and Sexual Activity   Drug Use No     Social History     Tobacco Use   Smoking Status Never Smoker   Smokeless Tobacco Never Used     Family History   Problem Relation Age of Onset    Heart disease Family     Diabetes Family     Heart disease Mother     Cancer Brother         neck    Throat cancer Brother     Ovarian cancer Maternal Aunt 40       Meds/Allergies   current meds:   Current Facility-Administered Medications   Medication Dose Route Frequency    amoxicillin (AMOXIL) capsule 1,000 mg  1,000 mg Oral Q8H [facility]    doxercalciferol (HECTOROL) injection 1 mcg  1 mcg Intravenous PRN    lidocaine (LIDODERM) 5 % patch 1 patch  1 patch Topical Once    and PTA meds:   Prior to Admission Medications   Prescriptions Last Dose Informant Patient Reported? Taking? ALPRAZolam (XANAX) 0 25 mg tablet 2/20/2020 at Unknown time Self Yes Yes   Sig: Take 0 25 mg by mouth 2 (two) times a day as needed for anxiety   B Complex-C-Folic Acid (TRIPHROCAPS) 1 MG CAPS 2/19/2020 at Unknown time Self No Yes   Sig: Take 1 capsule (1 mg total) by mouth daily   CALCIUM CARBONATE PO 2/19/2020 at Unknown time Self Yes Yes   Sig: Take 1,000 mg by mouth daily     Ferric Citrate (AURYXIA) 1  MG(Fe) TABS 2/19/2020 at Unknown time Self Yes Yes   Sig: Take by mouth   KIONEX 15 GM/60ML suspension 2/20/2020 at Unknown time Self Yes Yes   Sig: Take by mouth Take as directed   LIDOCAINE EX  Self Yes No   Sig: "Apply topically Apply to access 1 hour prior to HD   Melatonin 3 MG CAPS 2/19/2020 at Unknown time Self Yes Yes   Sig: Take 10 mg by mouth daily at bedtime     Podiatric Products (FLEXITOL HEEL BALM) OINT 2/19/2020 at Unknown time  No Yes   Sig: Apply topically 2 (two) times a day   TIMOLOL MALEATE OP 2/19/2020 at Unknown time Self Yes Yes   Sig: Apply 1 drop to eye 2 (two) times a day   atorvastatin (LIPITOR) 20 mg tablet 2/19/2020 at Unknown time Self Yes Yes   Sig: Take 20 mg by mouth every evening    brimonidine (ALPHAGAN P) 0 15 % ophthalmic solution 2/19/2020 at Unknown time Self Yes Yes   Sig: Administer 1 drop to both eyes 2 (two) times a day    carvedilol (COREG) 25 mg tablet Past Week at [id number] Self Yes Yes   Sig: Take 25 mg by mouth 2 (two) times a day with meals     cinacalcet (SENSIPAR) 30 mg tablet 2/19/2020 at Unknown time Self Yes Yes   Sig: Take 30 mg by mouth daily   diclofenac sodium (VOLTAREN) 1 % 2/19/2020 at Unknown time  No Yes   Sig: Apply 2 g topically 4 (four) times a day   diphenhydrAMINE (BENADRYL) 25 mg capsule Unknown at Unknown time Self Yes No   Sig: Take by mouth as needed for itching     dorzolamide-timolol (COSOPT) 22 3-6 8 MG/ML ophthalmic solution 2/19/2020 at Unknown time Self Yes Yes   Sig: instill 1 drop into both eyes twice a day   gabapentin (NEURONTIN) 100 mg capsule 2/19/2020 at Unknown time Self Yes Yes   Sig: Take 100 mg by mouth 3 (three) times a day     insulin glargine (LANTUS) 100 units/mL subcutaneous injection 2/19/2020 at Unknown time  No Yes   Sig: Inject 30 Units under the skin daily at bedtime   latanoprost (XALATAN) 0 005 % ophthalmic solution 2/19/2020 at Unknown time Self Yes Yes   Sig: Administer 1 drop to both eyes daily at bedtime   levothyroxine 50 mcg tablet 2/20/2020 at Unknown time Self Yes Yes   Sig: Take 50 mcg by mouth daily   loratadine (CLARITIN) 10 mg tablet 2/20/2020 at Unknown time  Yes Yes   Sig: Take 10 " mg by mouth daily   losartan (COZAAR) 25 mg tablet 2/20/2020 at Unknown time Self No Yes   Sig: Take 1 tablet (25 mg total) by mouth daily Take on NON-Dialysis Days   methazolamide (NEPTAZANE) 25 MG tablet 2/19/2020 at Unknown time Self Yes Yes   Sig: Take 25 mg by mouth 3 (three) times a day     nystatin (MYCOSTATIN) powder 2/19/2020 at Unknown time  No Yes   Sig: Apply topically 2 (two) times a day   ondansetron (ZOFRAN) 4 mg tablet 2/19/2020 at Unknown time Self No Yes   Sig: Take 1 tablet (4 mg total) by mouth every 8 (eight) hours as needed for nausea or vomiting   pantoprazole (PROTONIX) 40 mg tablet 2/19/2020 at Unknown time  Yes Yes   Sig: Take 40 mg by mouth daily   prednisoLONE acetate (PRED FORTE) 1 % ophthalmic suspension 2/20/2020 at Unknown time Self Yes Yes   Sig: Administer 1 drop to both eyes 4 (four) times a day     sertraline (ZOLOFT) 50 mg tablet 2/19/2020 at Unknown time Self No Yes   Sig: Take 1 tablet (50 mg total) by mouth daily   torsemide (DEMADEX) 100 mg tablet 2/19/2020 at Unknown time Self Yes Yes   Sig: Take 200 mg by mouth every 12 (twelve) hours   traMADol (ULTRAM) 50 mg tablet Not Taking at Unknown time  No No   Sig: Take 1 tablet (50 mg total) by mouth every 8 (eight) hours as needed for moderate pain   Patient not taking: Reported on 1/6/2020   warfarin (COUMADIN) 10 mg tablet 2/19/2020 at Unknown time  Yes Yes   Sig: Take 10 mg by mouth daily      Facility-Administered Medications: None       Scheduled Meds:    Current Facility-Administered Medications:  amoxicillin 1,000 mg Oral Q8H [facility] Lewis Shipley MD   doxercalciferol 1 mcg Intravenous PRN Venessa Tomas MD   lidocaine 1 patch Topical Once Lewis Shipley MD       PRN Meds: doxercalciferol    Continuous Infusions: Allergies   Allergen Reactions    Iodinated Diagnostic Agents Itching           Invasive Devices:    Invasive Devices     Peripheral Intravenous Line            Peripheral IV 02/20/20 Right Arm less than 1 day          Line            Hemodialysis AV Fistula 18 Left Forearm 729 days                    PHYSICAL EXAM  /71   Pulse 71   Temp 97 6 °F (36 4 °C) (Oral)   Resp 18   Ht 5' 6" (1 676 m)   Wt 127 kg (280 lb)   LMP 2016 (Exact Date)   SpO2 99%   BMI 45 19 kg/m²   Temp (24hrs), Av 1 °F (36 7 °C), Min:97 6 °F (36 4 °C), Max:98 6 °F (37 °C)        Intake/Output Summary (Last 24 hours) at 2020 1144  Last data filed at 2020 1040  Gross per 24 hour   Intake 200 ml   Output --   Net 200 ml       I/O last 24 hours: In: 200 [I V :200]  Out: -       Current Weight: Weight - Scale: 127 kg (280 lb)  First Weight: Weight - Scale: 127 kg (280 lb)  Physical Exam   Constitutional: She is oriented to person, place, and time  She appears well-developed and well-nourished  No distress  HENT:   Head: Normocephalic and atraumatic  Mouth/Throat: Oropharynx is clear and moist  No oropharyngeal exudate  Eyes: Conjunctivae are normal  No scleral icterus  Neck: Neck supple  No JVD present  No tracheal deviation present  No thyromegaly present  Cardiovascular: Normal heart sounds  Exam reveals no friction rub  Pulmonary/Chest: Effort normal  She has no wheezes  She has rales  Abdominal: Soft  She exhibits no distension and no mass  There is no tenderness  Musculoskeletal: She exhibits edema  Trace edema bilateral lower extremities, left arm AV fistula with thrill and bruit   Neurological: She is alert and oriented to person, place, and time  Skin: Skin is warm  No rash noted  She is not diaphoretic  Psychiatric: She has a normal mood and affect  Her behavior is normal    Vitals reviewed          LABORATORY:    Results from last 7 days   Lab Units 20  0855 20  0655   WBC Thousand/uL 8 83  --    HEMOGLOBIN g/dL 9 7*  --    HEMATOCRIT % 30 1*  --    PLATELETS Thousands/uL 198  --    POTASSIUM mmol/L  --  6 0*   CHLORIDE mmol/L  --  100   CO2 mmol/L  --  22   BUN mg/dL --  60*   CREATININE mg/dL  --  8 54*   CALCIUM mg/dL  --  8 6      rest all reviewed    RADIOLOGY:  XR chest 2 views   Final Result by Nash Duff MD (02/20 6181)      Persistent opacities in the left mid and lower lung zones suggesting left pleural effusion and questionable lingular infiltrate  Follow-up PA and lateral evaluation is recommended  The study was marked in [facility] for immediate notification  Workstation performed: [id number]           Rest all reviewed    Portions of the record may have been created with voice recognition software  Occasional wrong word or "sound a like" substitutions may have occurred due to the inherent limitations of voice recognition software  Read the chart carefully and recognize, using context, where substitutions have occurred  If you have any questions, please contact the dictating provider

## 2020-02-20 NOTE — H&P
H&P- Justino Glasgow 1965, 48 y o  female MRN: [id number]    Unit/Bed#: -01 Encounter: [id number]    Primary Care Provider: Isabela Clements MD   Date and time admitted to hospital: 2/20/2020  6:25 AM        * Flu-like symptoms  Assessment & Plan  Reports cough upper respiratory tract symptoms  Check flu/RSV PCR  Procalcitonin levels noted  Chest radiograph noted similar to prior  CT chest personally reviewed no active lung disease, minimal left pleural effusion/atelectasis  Patient received amoxicillin per ER physician  Patient is nontoxic appearing, hemodynamically stable  Monitor off antibiotics  Supportive care      History of DVT (deep vein thrombosis)  Assessment & Plan  Continue Coumadin  Monitor INR    Hyperkalemia  Assessment & Plan  History of ESRD on hemodialysis  Status post dialysis  Nephrology following    Ambulatory dysfunction  Assessment & Plan  Multifactorial  Safe ambulation fall precautions    Type 2 diabetes mellitus ([address] )  Assessment & Plan  Lab Results   Component Value Date    HGBA1C 10 4 (H) 08/27/2019       No results for input(s): POCGLU in the last 72 hours  Blood Sugar Average: Last 72 hrs:     Check A1c  Continue insulin  Monitor Accu-Cheks  Avoid hypoglycemia  Hypoglycemia protocol in place    ESRD on hemodialysis [facility])  Assessment & Plan  Hemodialysis per Nephrology          VTE Prophylaxis: Warfarin (Coumadin)  / sequential compression device   Code Status:  Full code  POLST: There is no POLST form on file for this patient (pre-hospital)  Discussion with family:  Discussed with the patient, she reports she will keep her family updated    Anticipated Length of Stay:  Patient will be admitted on an Observation basis with an anticipated length of stay of  less than 2 midnights  Chief Complaint:       Cough, shortness of breath    History of Present Illness:    Justino Glasgow is a 48 y o  female who presents with cough shortness of breath    Patient was scheduled for dialysis yesterday however she did not show up for dialysis, she presents to the ED today with complaints of cough shortness of breath chest tightness  She reports her symptoms are ongoing for the last couple of days, her appetite is poor, reports feeling lethargic and tired  She denies fever chills or sweats  She has cough productive of mucoid sputum, reports chest tightness and wheezing as well  Patient was evaluated in the ED given her worsening shortness of breath she has undergone dialysis, presently reports some improvement however continues to have cough  She was evaluated by ED physician and received IV amoxicillin for possible pneumonia given abnormal chest radiograph  She is being admitted for further evaluation and care  Review of Systems:    Review of Systems   All other systems reviewed and are negative        Past Medical and Surgical History:     Past Medical History:   Diagnosis Date    Abnormal uterine bleeding (AUB)     Anxiety     Diabetes mellitus [facility] [address]     Disease of thyroid gland     DVT (deep venous thrombosis) (HCC)     End stage kidney disease (HCC)     Foot ulcer due to secondary DM ([facility] [address] )     Hypertension     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[facility] [address] )     right eye    Morbid obesity (HCC)     Panic attacks     Reflux esophagitis     Thrombophlebitis of saphenous vein     Warfarin anticoagulation        Past Surgical History:   Procedure Laterality Date    ARTERIOVENOUS GRAFT PLACEMENT      CERVICAL BIOPSY  W/ LOOP ELECTRODE EXCISION       SECTION      DIALYSIS FISTULA CREATION      DILATION AND CURETTAGE OF UTERUS      ENDOMETRIAL ABLATION W/ NOVASURE      EYE SURGERY      HYSTERECTOMY      @ age 57 (complete)    IR FISTULA/GRAFTOGRAM  10/11/2019    OOPHORECTOMY Bilateral     @ age 57    PERICARDIUM SURGERY      MN COLONOSCOPY FLX DX W/COLLJ SPEC WHEN PFRMD N/A 3/13/2019    Procedure: COLONOSCOPY;  Surgeon: Nicolette Crawford MD;  [facility];  Service: Gastroenterology    PA ESOPHAGOGASTRODUODENOSCOPY TRANSORAL DIAGNOSTIC N/A 3/13/2019    Procedure: ESOPHAGOGASTRODUODENOSCOPY (EGD); Surgeon: Yelitza Azul MD;  Location: [facility]; Service: Gastroenterology    PA LAPAROSCOPY W TOT HYSTERECT UTERUS 250 GRAM OR LESS N/A 2/16/2016    Procedure: HYSTERECTOMY LAPAROSCOPIC TOTAL [facility]), with bilateral salpingectomy;  Surgeon: Nella Munguia DO;  Location[facility];  Service: Gynecology    THROMBECTOMY / ARTERIOVENOUS GRAFT REVISION      TOE SURGERY Right     removal of the 4th toe       Meds/Allergies:    Prior to Admission medications    Medication Sig Start Date End Date Taking? Authorizing Provider   ALPRAZolam Jorge L Laura) 0 25 mg tablet Take 0 25 mg by mouth 2 (two) times a day as needed for anxiety   Yes Historical Provider, MD   atorvastatin (LIPITOR) 20 mg tablet Take 20 mg by mouth every evening  4/24/18  Yes Historical Provider, MD   B Complex-JASON-Folic Acid (TRIPHROCAPS) 1 MG CAPS Take 1 capsule (1 mg total) by mouth daily 4/28/19  Yes LOIS Okeefe   brimonidine (ALPHAGAN P) 0 15 % ophthalmic solution Administer 1 drop to both eyes 2 (two) times a day    Yes Historical Provider, MD   CALCIUM CARBONATE PO Take 1,000 mg by mouth daily     Yes Historical Provider, MD   carvedilol (COREG) 25 mg tablet Take 25 mg by mouth 2 (two) times a day with meals     Yes Historical Provider, MD   cinacalcet (SENSIPAR) 30 mg tablet Take 30 mg by mouth daily   Yes Historical Provider, MD   diclofenac sodium (VOLTAREN) 1 % Apply 2 g topically 4 (four) times a day 5/19/19  Yes Ronn Bryson MD   dorzolamide-timolol (COSOPT) 22 3-6 8 MG/ML ophthalmic solution instill 1 drop into both eyes twice a day 2/1/19  Yes Historical Provider, MD   Ferric Citrate (AURYXIA) 1  MG(Fe) TABS Take by mouth 3/29/17  Yes Historical Provider, MD   gabapentin (NEURONTIN) 100 mg capsule Take 100 mg by mouth 3 (three) times a day     Yes Historical Provider, MD   insulin glargine (LANTUS) 100 units/mL subcutaneous injection Inject 30 Units under the skin daily at bedtime 8/31/19  Yes Flakito Guillen MD   Kellen Bongo 15 GM/60ML suspension Take by mouth Take as directed 12/10/18  Yes Historical Provider, MD   latanoprost (XALATAN) 0 005 % ophthalmic solution Administer 1 drop to both eyes daily at bedtime   Yes Historical Provider, MD   levothyroxine 50 mcg tablet Take 50 mcg by mouth daily   Yes Historical Provider, MD   loratadine (CLARITIN) 10 mg tablet Take 10 mg by mouth daily   Yes Historical Provider, MD   losartan (COZAAR) 25 mg tablet Take 1 tablet (25 mg total) by mouth daily Take on NON-Dialysis Days 5/23/18  Yes Madiha Dailey PA-C   Melatonin 3 MG CAPS Take 10 mg by mouth daily at bedtime     Yes Historical Provider, MD   methazolamide (NEPTAZANE) 25 MG tablet Take 25 mg by mouth 3 (three) times a day     Yes Historical Provider, MD   nystatin (MYCOSTATIN) powder Apply topically 2 (two) times a day 8/31/19  Yes Flakito Guillen MD   ondansetron [facility]) 4 mg tablet Take 1 tablet (4 mg total) by mouth every 8 (eight) hours as needed for nausea or vomiting 1/22/19  Yes Lindsey Beckett MD   pantoprazole (PROTONIX) 40 mg tablet Take 40 mg by mouth daily   Yes Historical Provider, MD   Podiatric Products (FLEXITOL HEEL BALM) OINT Apply topically 2 (two) times a day 1/31/20 3/31/20 Yes Wayne Aaron DPM   prednisoLONE acetate (PRED FORTE) 1 % ophthalmic suspension Administer 1 drop to both eyes 4 (four) times a day     Yes Historical Provider, MD   sertraline (ZOLOFT) 50 mg tablet Take 1 tablet (50 mg total) by mouth daily 4/28/19  Yes LOIS Okeefe   TIMOLOL MALEATE OP Apply 1 drop to eye 2 (two) times a day   Yes Historical Provider, MD   torsemide (DEMADEX) 100 mg tablet Take 200 mg by mouth every 12 (twelve) hours 1/2/19  Yes Historical Provider, MD   warfarin (COUMADIN) 10 mg tablet Take 10 mg by mouth daily   Yes Historical Provider, MD   acetaminophen (TYLENOL) 325 mg tablet Take 2 tablets (650 mg total) by mouth every 6 (six) hours as needed for mild pain or fever 2/20/20   Steph Graff MD   amoxicillin (AMOXIL) 500 mg capsule Take 2 capsules (1,000 mg total) by mouth every 8 (eight) hours for 7 days 2/20/20 2/27/20  Steph Graff MD   azithromycin (ZITHROMAX) 250 mg tablet Take 2 tablets today then 1 tablet daily x 4 days 2/20/20 2/24/20  Steph Graff MD   diphenhydrAMINE (BENADRYL) 25 mg capsule Take by mouth as needed for itching      Historical Provider, MD   LIDOCAINE EX Apply topically Apply to access 1 hour prior to HD    Historical Provider, MD   traMADol (ULTRAM) 50 mg tablet Take 1 tablet (50 mg total) by mouth every 8 (eight) hours as needed for moderate pain  Patient not taking: Reported on 1/6/2020 9/17/19   Fidel Danielson DPM     I have reviewed home medications with patient personally  Allergies:    Allergies   Allergen Reactions    Iodinated Diagnostic Agents Itching       Social History:     Marital Status: Single   Occupation:   Patient Pre-hospital Living Situation: home  Patient Pre-hospital Level of Mobility:  Ambulatory dysfunction  Patient Pre-hospital Diet Restrictions: no  Substance Use History:   Social History     Substance and Sexual Activity   Alcohol Use Not Currently    Alcohol/week: 0 0 standard drinks    Frequency: Never    Drinks per session: Patient refused    Binge frequency: Patient refused     Social History     Tobacco Use   Smoking Status Never Smoker   Smokeless Tobacco Never Used     Social History     Substance and Sexual Activity   Drug Use No       Family History:    Family History   Problem Relation Age of Onset    Heart disease Family     Diabetes Family     Heart disease Mother     Cancer Brother         neck    Throat cancer Brother     Ovarian cancer Maternal Aunt 40       Physical Exam:     Vitals:   Blood Pressure: 146/70 (02/20/20 1753)  Pulse: 83 (02/20/20 1753)  Temperature: 98 3 °F (36 8 °C) (02/20/20 1753)  Temp Source: Oral (02/20/20 1425)  Respirations: 18 (02/20/20 1753)  Height: 5' 6" (167 6 cm) (02/20/20 4516)  Weight - Scale: 127 kg (280 lb) (02/20/20 0634)  SpO2: 100 % (02/20/20 1753)    Physical Exam    Comfortably lying in bed  Morbidly obese  Short thick neck  Lungs diminished breath sounds bilaterally occasional rhonchi  Heart sounds S1-S2 noted distant no murmurs appreciable  Abdomen soft  Abdominal obesity noted  Awake alert obeys simple commands  Moves all extremities  Pulses noted  No rash      Additional Data:     Lab Results: I have personally reviewed pertinent reports  Results from last 7 days   Lab Units 02/20/20  0855   WBC Thousand/uL 8 83   HEMOGLOBIN g/dL 9 7*   HEMATOCRIT % 30 1*   PLATELETS Thousands/uL 198   NEUTROS PCT % 73   LYMPHS PCT % 13*   MONOS PCT % 8   EOS PCT % 4     Results from last 7 days   Lab Units 02/20/20  0655   SODIUM mmol/L 133*   POTASSIUM mmol/L 6 0*   CHLORIDE mmol/L 100   CO2 mmol/L 22   BUN mg/dL 60*   CREATININE mg/dL 8 54*   ANION GAP mmol/L 11   CALCIUM mg/dL 8 6   ALBUMIN g/dL 2 6*   TOTAL BILIRUBIN mg/dL 0 62   ALK PHOS U/L 120*   ALT U/L 19   AST U/L 31   GLUCOSE RANDOM mg/dL 99       Imaging: I have personally reviewed pertinent reports  Chest radiograph personally viewed left pleural effusion noted, diminished lung volumes    CT chest personally reviewed no active lung disease, minimal pleural effusions/atelectasis    XR chest 2 views   Final Result by Alba Langford MD (02/20 2331)      Persistent opacities in the left mid and lower lung zones suggesting left pleural effusion and questionable lingular infiltrate  Follow-up PA and lateral evaluation is recommended  The study was marked in [facility] for immediate notification        Workstation performed: [id number]         CT chest wo contrast    (Results Pending)       EKG, Pathology, and Other Studies Reviewed on Admission:   · EKG:  Normal sinus rhythm no ST T-wave changes    Allscripts / Epic Records Reviewed: Yes     ** Please Note: This note has been constructed using a voice recognition system   **

## 2020-02-20 NOTE — ASSESSMENT & PLAN NOTE
Lab Results   Component Value Date    HGBA1C 10 4 (H) 08/27/2019       No results for input(s): POCGLU in the last 72 hours      Blood Sugar Average: Last 72 hrs:     Check A1c  Continue insulin  Monitor Accu-Cheks  Avoid hypoglycemia  Hypoglycemia protocol in place

## 2020-02-20 NOTE — ED NOTES
Pt states that she is still feeling SOB and does not want to leave   [facility] made aware     Thalia Flores RN  02/20/20 0083

## 2020-02-20 NOTE — ED PROVIDER NOTES
ED Course as of Feb 20 1523   u Feb 20, 2020   0710 Cardiac  DC to hemodialysis        0838 Procedure Note for Ultrasound Guided Peripheral Line:  Skin prepared using Chloraprep  In the RIGHT brachial vein, using ultrasound guidance (CPT code 93846), an 18G peripheral IV long angiocatheter was placed successfully by physician secondary to difficulty placing IV by nursing staff  Successful  1One attempt  Good blood return  Good palpable flush  Adhered to skin using Tegederm  3235 Accepted in sign out and seen/examined  Has likely cost-condritis  2 days of CP  Will DC if negative workup to dialysis  0573 Discussed with nephrology  5454 Labs reviewed  EKG reviewed  Will DC  I reviewed all testing with the patient:   I gave oral return precautions for what to return for in addition to the written return precautions  The patient verbalized understanding of the discharge instructions and warnings that would necessitate return to the Emergency Department  I specifically highlighted areas of special concern regarding the written and verbal discharge instructions and return precautions  All questions were answered prior to discharge  0945 I reviewed all testing with the patient:   I gave oral return precautions for what to return for in addition to the written return precautions  The patient verbalized understanding of the discharge instructions and warnings that would necessitate return to the Emergency Department  I specifically highlighted areas of special concern regarding the written and verbal discharge instructions and return precautions  All questions were answered prior to discharge  1503 Took enough that back to base weight  1521 Patient was sent back downstairs after dialysis  She continues to say that she is feeling very short of breath despite normal vital signs  No hypoxia noted  Patient does not want to go home    Discussed admission for observation given the dyspnea      Admission would like a procalcitonin which I think is reasonable             Dong Castelan MD  02/20/20 1002       Dong Castelan MD  02/20/20 4798

## 2020-02-20 NOTE — DISCHARGE INSTRUCTIONS
Shortness of breath: due to volume overload  · Increase Coumadin to 12 mg at dinner time  · Have your INR checked at dialysis tomorrow (goal INR 2-3)   · Have your primary doctor adjust your Coumadin as needed based on INR  · Follow a low sodium diet and 1,500 mL fluid restriction to avoid volume overload   · Monitor your intake and output  · Monitor your weights  · Call your nephrologist if you gain more than 5 pounds in 2 days   · Take Albuterol inhaler as needed for wheezing or shortness of breath

## 2020-02-20 NOTE — ASSESSMENT & PLAN NOTE
Reports cough upper respiratory tract symptoms  Check flu/RSV PCR  Procalcitonin levels noted  Chest radiograph noted similar to prior  CT chest personally reviewed no active lung disease, minimal left pleural effusion/atelectasis  Patient received amoxicillin per ER physician  Patient is nontoxic appearing, hemodynamically stable  Monitor off antibiotics  Supportive care

## 2020-02-20 NOTE — HEMODIALYSIS
Late entry for 1030 am:  Patient went to bathroom prior to dialysis treatment  When came out of bathroom, IV catheter was hanging out of right arm  Tape was removed from arm and IV catheter was discarded  No further bleeding noted from site    Reported to [facility]

## 2020-02-20 NOTE — HEMODIALYSIS
Completed 3 hrs 45 minutes of treatment  Used her LAF with a BFR of 400  A 1 k Bath was used the last hr of treatment today because her Potassium was 6  No problems during treatment  Fluid removal 4200  Patient received amoxicillin 1000mg po post treatment  Report called to Chavo Segura RN in the ED  Patient returned to the ED dept via wc     Pre wt 126 6 kg   Post wt 122 8 kg

## 2020-02-20 NOTE — ED ATTENDING ATTESTATION
2/20/2020  IAntonia MD, saw and evaluated the patient  I have discussed the patient with the resident/non-physician practitioner and agree with the resident's/non-physician practitioner's findings, Plan of Care, and MDM as documented in the resident's/non-physician practitioner's note, except where noted  All available labs and Radiology studies were reviewed  I was present for key portions of any procedure(s) performed by the resident/non-physician practitioner and I was immediately available to provide assistance  At this point I agree with the current assessment done in the Emergency Department  I have conducted an independent evaluation of this patient a history and physical is as follows:    ED Course     Emergency Department Note- Meño Weeks 50 y o  female MRN: [id number]    Unit/Bed#: -01 Encounter: [id number]    Meño Weeks is a 50 y o  female who presents with   Chief Complaint   Patient presents with    Chest Pain     pt coming from dialysis, complains of chest pain and sob for two days  History of Present Illness   HPI:  Meño Weeks is a 50 y o  female who presents for evaluation of:  Chest wall pain that has been intermittent over the last 2 days  Patient has ESRD and gets HD TTHS  Patient was at HD this am and was sent over for evaluation  Review of Systems   Constitutional: Positive for chills  Negative for fever  Respiratory: Positive for cough, chest tightness and shortness of breath  Cardiovascular: Negative for chest pain and palpitations  Neurological: Positive for headaches  Negative for light-headedness  All other systems reviewed and are negative        Historical Information   Past Medical History:   Diagnosis Date    Abnormal uterine bleeding (AUB)     Anxiety     [facility] mellitus [facility] [address] )     Disease of thyroid gland     DVT (deep venous thrombosis) (HCC)     End stage kidney disease (HCC)     Foot ulcer due to secondary DM ([facility] [address] )     Hypertension     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[facility] [address] )     right eye    Morbid obesity (HCC)     Panic attacks     Reflux esophagitis     Thrombophlebitis of saphenous vein     Warfarin anticoagulation      Past Surgical History:   Procedure Laterality Date    ARTERIOVENOUS GRAFT PLACEMENT      CERVICAL BIOPSY  W/ LOOP ELECTRODE EXCISION       SECTION      DIALYSIS FISTULA CREATION      DILATION AND CURETTAGE OF UTERUS      ENDOMETRIAL ABLATION W/ NOVASURE      EYE SURGERY      HYSTERECTOMY      @ age 57 (complete)   Yomaira Vega IR FISTULA/GRAFTOGRAM  10/11/2019    OOPHORECTOMY Bilateral     @ age 57    PERICARDIUM SURGERY      RI COLONOSCOPY FLX DX W/COLLJ SPEC WHEN PFRMD N/A 3/13/2019    Procedure: COLONOSCOPY;  Surgeon: Moisés Gaines MD;  [facility]; Service: Gastroenterology    RI ESOPHAGOGASTRODUODENOSCOPY TRANSORAL DIAGNOSTIC N/A 3/13/2019    Procedure: ESOPHAGOGASTRODUODENOSCOPY (EGD); Surgeon: Moisés Gaines MD;  Location: [facility];   Service: Gastroenterology    RI LAPAROSCOPY W TOT HYSTERECT UTERUS 250 GRAM OR LESS N/A 2016    Procedure: HYSTERECTOMY LAPAROSCOPIC TOTAL ([address]), with bilateral salpingectomy;  Surgeon: Chago Mckeon DO;  [facility];  Service: Gynecology    THROMBECTOMY / ARTERIOVENOUS GRAFT REVISION      TOE SURGERY Right     removal of the 4th toe     Social History   Social History     Substance and Sexual Activity   Alcohol Use Never    Alcohol/week: 0 0 standard drinks    Frequency: Never    Drinks per session: Patient refused    Binge frequency: Patient refused     Social History     Substance and Sexual Activity   Drug Use No     Social History     Tobacco Use   Smoking Status Never Smoker   Smokeless Tobacco Never Used     Family History: non-contributory    Meds/Allergies   all medications and allergies reviewed  Allergies   Allergen Reactions    Iodinated Diagnostic Agents Itching       Objective   First Vitals:   Blood Pressure: 148/68 (20 0634)  Pulse: 68 (20 0634)  Temperature: 98 6 °F (37 °C) (20 0634)  Temp Source: Oral (20 1045)  Respirations: 18 (20 0634)  Height: 5' 6" (167 6 cm) (20 0068)  Weight - Scale: 127 kg (280 lb) (20 06)  SpO2: 93 % (20)    Current Vitals:   Blood Pressure: 118/68 (20 0722)  Pulse: 72 (20 0721)  Temperature: 98 4 °F (36 9 °C) (20 07)  Temp Source: Oral (20 2301)  Respirations: 18 (20 07)  Height: 5' 6" (167 6 cm) (20 06)  Weight - Scale: 127 kg (280 lb) (2034)  SpO2: 92 % (20 2301)    No intake or output data in the 24 hours ending 20 1402    Invasive Devices     Line            Hemodialysis AV Fistula 18 Left Forearm 777 days                Physical Exam   Constitutional: She is oriented to person, place, and time  She appears well-developed and well-nourished  HENT:   Head: Normocephalic and atraumatic  Cardiovascular: Normal rate and regular rhythm  Pulmonary/Chest: Effort normal  She has rales in the right lower field and the left lower field  Musculoskeletal: Normal range of motion  Right lower leg: Normal         Left lower leg: Normal    Palpable thrill left arm   Neurological: She is alert and oriented to person, place, and time  Skin: Skin is warm and dry  Capillary refill takes less than 2 seconds  Psychiatric: She has a normal mood and affect  Her behavior is normal    Nursing note and vitals reviewed  48 y F in no distress    Medical Decision Makin  CP episode:   2  ESRD on HD TTHS    No results found for this or any previous visit (from the past 39 hour(s))  CT chest wo contrast   Final Result      Mild patchy groundglass opacities within the left lower lobe, nonspecific but could be infectious or inflammatory in etiology  Right lower lobe subsegmental atelectasis  Mild cardiomegaly  Atrophic native kidneys        Renal osteodystrophy  Workstation performed: [id number]         XR chest 2 views   Final Result      Persistent opacities in the left mid and lower lung zones suggesting left pleural effusion and questionable lingular infiltrate  Follow-up PA and lateral evaluation is recommended  The study was marked in [facility] for immediate notification  Workstation performed: [id number]               Portions of the record may have been created with voice recognition software  Occasional wrong word or "sound a like" substitutions may have occurred due to the inherent limitations of voice recognition software  Read the chart carefully and recognize, using context, where substitutions have occurred          Critical Care Time  Procedures

## 2020-02-21 VITALS
HEIGHT: 66 IN | SYSTOLIC BLOOD PRESSURE: 118 MMHG | DIASTOLIC BLOOD PRESSURE: 68 MMHG | BODY MASS INDEX: 45 KG/M2 | WEIGHT: 280 LBS | HEART RATE: 72 BPM | OXYGEN SATURATION: 92 % | TEMPERATURE: 98.4 F | RESPIRATION RATE: 18 BRPM

## 2020-02-21 LAB
ANION GAP SERPL CALCULATED.3IONS-SCNC: 7 MMOL/L (ref 4–13)
BUN SERPL-MCNC: 49 MG/DL (ref 5–25)
CALCIUM SERPL-MCNC: 8.8 MG/DL (ref 8.3–10.1)
CHLORIDE SERPL-SCNC: 96 MMOL/L (ref 100–108)
CO2 SERPL-SCNC: 32 MMOL/L (ref 21–32)
CREAT SERPL-MCNC: 6.98 MG/DL (ref 0.6–1.3)
GFR SERPL CREATININE-BSD FRML MDRD: 6 ML/MIN/1.73SQ M
GLUCOSE SERPL-MCNC: 194 MG/DL (ref 65–140)
GLUCOSE SERPL-MCNC: 255 MG/DL (ref 65–140)
GLUCOSE SERPL-MCNC: 258 MG/DL (ref 65–140)
INR PPP: 1.41 (ref 0.84–1.19)
POTASSIUM SERPL-SCNC: 5 MMOL/L (ref 3.5–5.3)
PROTHROMBIN TIME: 16.8 SECONDS (ref 11.6–14.5)
SODIUM SERPL-SCNC: 135 MMOL/L (ref 136–145)

## 2020-02-21 PROCEDURE — 80048 BASIC METABOLIC PNL TOTAL CA: CPT | Performed by: NURSE PRACTITIONER

## 2020-02-21 PROCEDURE — 99217 PR OBSERVATION CARE DISCHARGE MANAGEMENT: CPT | Performed by: NURSE PRACTITIONER

## 2020-02-21 PROCEDURE — 85610 PROTHROMBIN TIME: CPT | Performed by: NURSE PRACTITIONER

## 2020-02-21 PROCEDURE — 99213 OFFICE O/P EST LOW 20 MIN: CPT | Performed by: NURSE PRACTITIONER

## 2020-02-21 PROCEDURE — 82948 REAGENT STRIP/BLOOD GLUCOSE: CPT

## 2020-02-21 PROCEDURE — 87070 CULTURE OTHR SPECIMN AEROBIC: CPT | Performed by: INTERNAL MEDICINE

## 2020-02-21 PROCEDURE — 87205 SMEAR GRAM STAIN: CPT | Performed by: INTERNAL MEDICINE

## 2020-02-21 RX ORDER — ALBUTEROL SULFATE 90 UG/1
2 AEROSOL, METERED RESPIRATORY (INHALATION) EVERY 6 HOURS PRN
Qty: 8.5 G | Refills: 0 | Status: SHIPPED | OUTPATIENT
Start: 2020-02-21 | End: 2020-02-21 | Stop reason: SDUPTHER

## 2020-02-21 RX ORDER — HEPARIN SODIUM 5000 [USP'U]/ML
5000 INJECTION, SOLUTION INTRAVENOUS; SUBCUTANEOUS EVERY 8 HOURS SCHEDULED
Status: DISCONTINUED | OUTPATIENT
Start: 2020-02-21 | End: 2020-02-21 | Stop reason: HOSPADM

## 2020-02-21 RX ORDER — GABAPENTIN 100 MG/1
300 CAPSULE ORAL
Refills: 0
Start: 2020-02-21 | End: 2021-09-13

## 2020-02-21 RX ORDER — WARFARIN SODIUM 2 MG/1
TABLET ORAL
Qty: 7 TABLET | Refills: 0 | Status: SHIPPED | OUTPATIENT
Start: 2020-02-21 | End: 2020-02-21 | Stop reason: SDUPTHER

## 2020-02-21 RX ORDER — WARFARIN SODIUM 2 MG/1
TABLET ORAL
Qty: 7 TABLET | Refills: 0 | Status: SHIPPED | OUTPATIENT
Start: 2020-02-21 | End: 2021-06-11 | Stop reason: HOSPADM

## 2020-02-21 RX ORDER — ALBUTEROL SULFATE 90 UG/1
2 AEROSOL, METERED RESPIRATORY (INHALATION) EVERY 6 HOURS PRN
Qty: 8.5 G | Refills: 0 | Status: ON HOLD | OUTPATIENT
Start: 2020-02-21 | End: 2021-06-11 | Stop reason: SDUPTHER

## 2020-02-21 RX ORDER — SENNOSIDES 8.6 MG
2 TABLET ORAL
Qty: 30 EACH | Refills: 0 | Status: SHIPPED | OUTPATIENT
Start: 2020-02-21

## 2020-02-21 RX ORDER — TRAMADOL HYDROCHLORIDE 50 MG/1
50 TABLET ORAL EVERY 12 HOURS PRN
Qty: 10 TABLET | Refills: 0 | Status: SHIPPED | OUTPATIENT
Start: 2020-02-21 | End: 2020-02-21 | Stop reason: SDUPTHER

## 2020-02-21 RX ORDER — SENNOSIDES 8.6 MG
2 TABLET ORAL
Qty: 60 EACH | Refills: 0 | Status: SHIPPED | OUTPATIENT
Start: 2020-02-21 | End: 2020-02-21 | Stop reason: SDUPTHER

## 2020-02-21 RX ORDER — PANTOPRAZOLE SODIUM 40 MG/1
40 TABLET, DELAYED RELEASE ORAL
Refills: 0
Start: 2020-02-21 | End: 2021-11-13 | Stop reason: HOSPADM

## 2020-02-21 RX ORDER — AMOXICILLIN 500 MG/1
1000 CAPSULE ORAL EVERY 8 HOURS SCHEDULED
Qty: 42 CAPSULE | Refills: 0 | Status: CANCELLED | OUTPATIENT
Start: 2020-02-21 | End: 2020-02-28

## 2020-02-21 RX ORDER — AZITHROMYCIN 250 MG/1
TABLET, FILM COATED ORAL
Qty: 6 TABLET | Refills: 0 | Status: CANCELLED | OUTPATIENT
Start: 2020-02-21 | End: 2020-02-25

## 2020-02-21 RX ORDER — TRAMADOL HYDROCHLORIDE 50 MG/1
50 TABLET ORAL EVERY 12 HOURS PRN
Qty: 10 TABLET | Refills: 0 | Status: SHIPPED | OUTPATIENT
Start: 2020-02-21 | End: 2021-06-11 | Stop reason: HOSPADM

## 2020-02-21 RX ADMIN — CALCIUM CARBONATE 1250 MG: 1250 SUSPENSION ORAL at 07:25

## 2020-02-21 RX ADMIN — BENZONATATE 200 MG: 100 CAPSULE ORAL at 08:26

## 2020-02-21 RX ADMIN — DICLOFENAC SODIUM 2 G: 10 GEL TOPICAL at 11:07

## 2020-02-21 RX ADMIN — INSULIN LISPRO 4 UNITS: 100 INJECTION, SOLUTION INTRAVENOUS; SUBCUTANEOUS at 00:04

## 2020-02-21 RX ADMIN — INSULIN LISPRO 1 UNITS: 100 INJECTION, SOLUTION INTRAVENOUS; SUBCUTANEOUS at 06:21

## 2020-02-21 RX ADMIN — INSULIN LISPRO 2 UNITS: 100 INJECTION, SOLUTION INTRAVENOUS; SUBCUTANEOUS at 11:07

## 2020-02-21 RX ADMIN — HEPARIN SODIUM 5000 UNITS: 5000 INJECTION INTRAVENOUS; SUBCUTANEOUS at 09:40

## 2020-02-21 RX ADMIN — LEVOTHYROXINE SODIUM 50 MCG: 50 TABLET ORAL at 06:21

## 2020-02-21 RX ADMIN — BRIMONIDINE TARTRATE 1 DROP: 1.5 SOLUTION OPHTHALMIC at 09:40

## 2020-02-21 RX ADMIN — CARVEDILOL 25 MG: 25 TABLET, FILM COATED ORAL at 07:25

## 2020-02-21 RX ADMIN — GUAIFENESIN 600 MG: 600 TABLET, EXTENDED RELEASE ORAL at 08:26

## 2020-02-21 RX ADMIN — LORATADINE 10 MG: 10 TABLET ORAL at 08:25

## 2020-02-21 RX ADMIN — DORZOLAMIDE HYDROCHLORIDE AND TIMOLOL MALEATE 1 DROP: 20; 5 SOLUTION/ DROPS OPHTHALMIC at 08:27

## 2020-02-21 RX ADMIN — PANTOPRAZOLE SODIUM 40 MG: 40 TABLET, DELAYED RELEASE ORAL at 06:21

## 2020-02-21 RX ADMIN — LOSARTAN POTASSIUM 25 MG: 25 TABLET ORAL at 08:26

## 2020-02-21 RX ADMIN — SERTRALINE HYDROCHLORIDE 50 MG: 50 TABLET ORAL at 08:26

## 2020-02-21 RX ADMIN — TORSEMIDE 200 MG: 20 TABLET ORAL at 08:25

## 2020-02-21 RX ADMIN — PREDNISOLONE ACETATE 1 DROP: 10 SUSPENSION/ DROPS OPHTHALMIC at 08:51

## 2020-02-21 RX ADMIN — Medication 1 CAPSULE: at 08:25

## 2020-02-21 RX ADMIN — CINACALCET HYDROCHLORIDE 30 MG: 30 TABLET, FILM COATED ORAL at 08:27

## 2020-02-21 RX ADMIN — PREDNISOLONE ACETATE 1 DROP: 10 SUSPENSION/ DROPS OPHTHALMIC at 11:07

## 2020-02-21 RX ADMIN — TRAMADOL HYDROCHLORIDE 50 MG: 50 TABLET, FILM COATED ORAL at 09:46

## 2020-02-21 RX ADMIN — NYSTATIN: 100000 POWDER TOPICAL at 08:27

## 2020-02-21 RX ADMIN — DICLOFENAC SODIUM 2 G: 10 GEL TOPICAL at 08:27

## 2020-02-21 NOTE — ASSESSMENT & PLAN NOTE
Continued Coumadin 10 mg daily  INR 1 36 -> 1 41  Started on Heparin SQ in addition to Coumadin as she is not therapeutic   Patient desires to be discharged home despite a subtherapeutic INR   Will discharge on Coumadin 12 mg at dinner time for 7 days with an INR check tomorrow at dialysis with PCP follow-up regarding Coumadin adjustment if needed

## 2020-02-21 NOTE — NURSING NOTE
Patient discharged to home via wheelchair accompanied by volunteer in no acute distress or pain  AVS explained to patient prior to discharge

## 2020-02-21 NOTE — ASSESSMENT & PLAN NOTE
K+ 6 0  History of ESRD on hemodialysis  S/p dialysis yesterday  K+ today: 5 0  Resume dialysis tomorrow

## 2020-02-21 NOTE — DISCHARGE SUMMARY
Discharge- Yamileth Cliff 1965, 48 y o  female MRN: [id number]    Unit/Bed#: -01 Encounter: [id number]    Primary Care Provider: Danielle Cardoza MD   Date and time admitted to hospital: 2/20/2020  6:25 AM        * Flu-like symptoms  Assessment & Plan  Reports cough upper respiratory tract symptoms  Flu/RSV PCR, negative   Procalcitonin 0/19  Chest radiograph noted similar to prior  CT chest personally reviewed no active lung disease, minimal left pleural effusion/atelectasis - suspect volume overload   Patient received amoxicillin per ER physician  Patient is nontoxic appearing, hemodynamically stable, afebrile without leukocytosis  No sputum production  · No need for ABX at this time  · Continue fluid management with HD and diuretics  · Encourage low-sodium diet and deep breathing   · Discharge with albuterol inh as needed     History of DVT (deep vein thrombosis)  Assessment & Plan  Continued Coumadin 10 mg daily  INR 1 36 -> 1 41  Started on Heparin SQ in addition to Coumadin as she is not therapeutic   Patient desires to be discharged home despite a subtherapeutic INR   Will discharge on Coumadin 12 mg at dinner time for 7 days with an INR check tomorrow at dialysis with PCP follow-up regarding Coumadin adjustment if needed     Hyperkalemia  Assessment & Plan  K+ 6 0  History of ESRD on hemodialysis  S/p dialysis yesterday  K+ today: 5 0  Resume dialysis tomorrow     ESRD on hemodialysis [facility])  Assessment & Plan  Hemodialysis per Nephrology T, Th, Sat     Ambulatory dysfunction  Assessment & Plan  Multifactorial  Fall precautions, recommend weight loss  Type 2 diabetes mellitus [facility])  Assessment & Plan  Lab Results   Component Value Date    HGBA1C 8 6 (H) 02/20/2020       Recent Labs     02/20/20  1855 02/20/20 2054 02/21/20  0615   POCGLU 355* 401* 194*       Blood Sugar Average: Last 72 hrs:  (P) 796 [id number]   A1c 8 6  Continue insulin  Monitor Accu-Cheks  Discussed diabetic diet  Needs outpatient follow-up   Avoid hypoglycemia      Discharging Physician / Practitioner: LOIS Nelson  PCP: Sedrick Valdivia MD  Admission Date:   Admission Orders (From admission, onward)     Ordered        02/20/20 1520  Place in Observation (expected length of stay for this patient is less than two midnights)  Once                   Discharge Date: 02/21/20    Resolved Problems  Date Reviewed: 2/21/2020    None          Consultations During Hospital Stay:  · Nephrology     Procedures Performed:   · CXR: Persistent opacities in the left mid and lower lung zones suggesting left pleural effusion and questionable lingular infiltrate  Follow-up PA and lateral evaluation is recommended  · CT chest: Mild patchy groundglass opacities within the left lower lobe, nonspecific but could be infectious or inflammatory in etiology  Right lower lobe subsegmental atelectasis  Mild cardiomegaly  Atrophic native kidneys  Renal osteodystrophy  Significant Findings / Test Results:   · Persistent opacities in the left mid and lower lung zones suggesting left pleural effusion and questionable lingular infiltrate  · No consolidations   · INR 1 41    Incidental Findings:   · As above     Test Results Pending at Discharge (will require follow up): · None     Outpatient Tests Requested:  · INR     Complications:  None    Reason for Admission: Shortness of breath     Hospital Course:     Carole Bey is a 48 y o  female patient with a PMH including ESRD and T2DM who originally presented to the hospital on 2/20/2020 due to shortness of breath and fu-like symptoms  Workup in the ER included a CXR which revealed persistent opacities in the left mid and lower lung zones suggesting left pleural effusion and questionable lingular infiltrate   A CT chest revealed mild patchy groundglass opacities within the left lower lobe, nonspecific but could be infectious or inflammatory in etiology, right lower lobe subsegmental atelectasis  Patient was admitted for further evaluation and treatment  A flu PCR was negative  Patient was managed with HD per nephrology  She received a treatment on day of admission with improvement in her shortness of breath  Suspect the ground glass opacities are due to fluid  She is to continue HD tomorrow, continue diuretics, and follow a low-sodium diet  I have encouraged weight loss and deep breathing  Of note, her INR was 1 36 on admission while on Coumadin 10 mg daily  She was started on Heparin SQ in addition to Coumadin while hospitalized as she was not therapeutic  Her repeat INR improved to 1 41  She is aware her INR is not therapeutic, but she does not want to stay in the hospital for SQ Heparin  Will discharge her on 12 mg daily with repeat INR tomorrow with PCP follow-up for dosing adjustment if needed  Of note, patient has had very labile INR results varying from subtherapeutic to supra therapeutic  Please see above list of diagnoses and related plan for additional information  Condition at Discharge: stable     Discharge Day Visit / Exam:     Subjective:  Wants to go home today  Ambulating around room on room air without shortness of breath  Slept well on room air with use of a fan  No pain  No sputum production  No fever or chills  No chest pain  Vitals: Blood Pressure: 118/68 (02/21/20 0722)  Pulse: 72 (02/21/20 0721)  Temperature: 98 4 °F (36 9 °C) (02/21/20 0722)  Temp Source: Oral (02/20/20 2301)  Respirations: 18 (02/21/20 0722)  Height: 5' 6" (167 6 cm) (02/20/20 8938)  Weight - Scale: 127 kg (280 lb) (02/20/20 0634)  SpO2: 92 % (02/20/20 2301)  Exam:   Physical Exam   Constitutional: She is oriented to person, place, and time  She appears well-developed  No distress  Pleasant obese female ambulating around room on room air    HENT:   Head: Normocephalic  Neck: Normal range of motion  Cardiovascular: Normal rate and intact distal pulses     Pulmonary/Chest: Effort normal  No accessory muscle usage  No tachypnea  No respiratory distress  She has decreased breath sounds in the right lower field and the left lower field  She has no wheezes  She has no rhonchi  She has no rales  Abdominal: Soft  Bowel sounds are normal  She exhibits no distension  There is no tenderness  Obese    Musculoskeletal: Normal range of motion  She exhibits no edema  Neurological: She is alert and oriented to person, place, and time  Skin: Skin is warm and dry  Capillary refill takes less than 2 seconds  No rash noted  She is not diaphoretic  Left forearm AV fistula with good thrill and bruit    Psychiatric: She has a normal mood and affect  Her behavior is normal    Nursing note and vitals reviewed  Discussion with Family: None    Discharge instructions/Information to patient and family:   See after visit summary for information provided to patient and family  Provisions for Follow-Up Care:  See after visit summary for information related to follow-up care and any pertinent home health orders  Disposition:     Home    For Discharges to   [address] [facility]:   · Not Applicable to this Patient - Not Applicable to this Patient    Planned Readmission: None     Discharge Statement:  I spent > 30 minutes discharging the patient  This time was spent on the day of discharge  I had direct contact with the patient on the day of discharge  Greater than 50% of the total time was spent examining patient, answering all patient questions, arranging and discussing plan of care with patient as well as directly providing post-discharge instructions  Additional time then spent on discharge activities  Discharge Medications:  See after visit summary for reconciled discharge medications provided to patient and family        ** Please Note: This note has been constructed using a voice recognition system **

## 2020-02-21 NOTE — ASSESSMENT & PLAN NOTE
Reports cough upper respiratory tract symptoms  Flu/RSV PCR, negative   Procalcitonin 0/19  Chest radiograph noted similar to prior  CT chest personally reviewed no active lung disease, minimal left pleural effusion/atelectasis - suspect volume overload   Patient received amoxicillin per ER physician  Patient is nontoxic appearing, hemodynamically stable, afebrile without leukocytosis  No sputum production  · No need for ABX at this time  · Continue fluid management with HD and diuretics  · Encourage low-sodium diet and deep breathing   · Discharge with albuterol inh as needed

## 2020-02-21 NOTE — PLAN OF CARE
Problem: METABOLIC, FLUID AND ELECTROLYTES - ADULT  Goal: Electrolytes maintained within normal limits  Description  INTERVENTIONS:  - Monitor labs and assess patient for signs and symptoms of electrolyte imbalances  - Administer electrolyte replacement as ordered  - Monitor response to electrolyte replacements, including repeat lab results as appropriate  - Instruct patient on fluid and nutrition as appropriate  Outcome: Progressing  Goal: Fluid balance maintained  Description  INTERVENTIONS:  - Monitor labs   - Monitor I/O and WT  - Instruct patient on fluid and nutrition as appropriate  - Assess for signs & symptoms of volume excess or deficit  Outcome: Progressing     Problem: Potential for Falls  Goal: Patient will remain free of falls  Description  INTERVENTIONS:  - Assess patient frequently for physical needs  -  Identify cognitive and physical deficits and behaviors that affect risk of falls  -  [place] fall precautions as indicated by assessment   - Educate patient/family on patient safety including physical limitations  - Instruct patient to call for assistance with activity based on assessment  - Modify environment to reduce risk of injury  - Consider OT/PT consult to assist with strengthening/mobility  Outcome: Progressing     Problem: Nutrition/Hydration-ADULT  Goal: Nutrient/Hydration intake appropriate for improving, restoring or maintaining nutritional needs  Description  Monitor and assess patient's nutrition/hydration status for malnutrition  Collaborate with interdisciplinary team and initiate plan and interventions as ordered  Monitor patient's weight and dietary intake as ordered or per policy  Utilize nutrition screening tool and intervene as necessary  Determine patient's food preferences and provide high-protein, high-caloric foods as appropriate       INTERVENTIONS:  - Monitor oral intake, urinary output, labs, and treatment plans  - Assess nutrition and hydration status and recommend course of action  - Evaluate amount of meals eaten  - Assist patient with eating if necessary   - Allow adequate time for meals  - Recommend/ encourage appropriate diets, oral nutritional supplements, and vitamin/mineral supplements  - Order, calculate, and assess calorie counts as needed  - Recommend, monitor, and adjust tube feedings and TPN/PPN based on assessed needs  - Assess need for intravenous fluids  - Provide specific nutrition/hydration education as appropriate  - Include patient/family/caregiver in decisions related to nutrition  Outcome: Progressing     Problem: PAIN - ADULT  Goal: Verbalizes/displays adequate comfort level or baseline comfort level  Description  Interventions:  - Encourage patient to monitor pain and request assistance  - Assess pain using appropriate pain scale  - Administer analgesics based on type and severity of pain and evaluate response  - Implement non-pharmacological measures as appropriate and evaluate response  - Consider cultural and social influences on pain and pain management  - Notify physician/advanced practitioner if interventions unsuccessful or patient reports new pain  Outcome: Progressing     Problem: INFECTION - ADULT  Goal: Absence or prevention of progression during hospitalization  Description  INTERVENTIONS:  - Assess and monitor for signs and symptoms of infection  - Monitor lab/diagnostic results  - Monitor all insertion sites, i e  indwelling lines, tubes, and drains  - Monitor endotracheal if appropriate and nasal secretions for changes in amount and color  - [place] appropriate cooling/warming therapies per order  - Administer medications as ordered  - Instruct and encourage patient and family to use good hand hygiene technique  - Identify and instruct in appropriate isolation precautions for identified infection/condition  Outcome: Progressing  Goal: Absence of fever/infection during neutropenic period  Description  INTERVENTIONS:  - Monitor WBC    Outcome: Progressing     Problem: SAFETY ADULT  Goal: Patient will remain free of falls  Description  INTERVENTIONS:  - Assess patient frequently for physical needs  -  Identify cognitive and physical deficits and behaviors that affect risk of falls    -  [place] fall precautions as indicated by assessment   - Educate patient/family on patient safety including physical limitations  - Instruct patient to call for assistance with activity based on assessment  - Modify environment to reduce risk of injury  - Consider OT/PT consult to assist with strengthening/mobility  Outcome: Progressing  Goal: Maintain or return to baseline ADL function  Description  INTERVENTIONS:  -  Assess patient's ability to carry out ADLs; assess patient's baseline for ADL function and identify physical deficits which impact ability to perform ADLs (bathing, care of mouth/teeth, toileting, grooming, dressing, etc )  - Assess/evaluate cause of self-care deficits   - Assess range of motion  - Assess patient's mobility; develop plan if impaired  - Assess patient's need for assistive devices and provide as appropriate  - Encourage maximum independence but intervene and supervise when necessary  - Involve family in performance of ADLs  - Assess for home care needs following discharge   - Consider OT consult to assist with ADL evaluation and planning for discharge  - Provide patient education as appropriate  Outcome: Progressing  Goal: Maintain or return mobility status to optimal level  Description  INTERVENTIONS:  - Assess patient's baseline mobility status (ambulation, transfers, stairs, etc )    - Identify cognitive and physical deficits and behaviors that affect mobility  - Identify mobility aids required to assist with transfers and/or ambulation (gait belt, sit-to-stand, lift, walker, cane, etc )  - [place] fall precautions as indicated by assessment  - Record patient progress and toleration of activity level on Mobility SBAR; progress patient to next Phase/Stage  - Instruct patient to call for assistance with activity based on assessment  - Consider rehabilitation consult to assist with strengthening/weightbearing, etc   Outcome: Progressing     Problem: DISCHARGE PLANNING  Goal: Discharge to home or other facility with appropriate resources  Description  INTERVENTIONS:  - Identify barriers to discharge w/patient and caregiver  - Arrange for needed discharge resources and transportation as appropriate  - Identify discharge learning needs (meds, wound care, etc )  - Arrange for interpretive services to assist at discharge as needed  - Refer to Case Management Department for coordinating discharge planning if the patient needs post-hospital services based on physician/advanced practitioner order or complex needs related to functional status, cognitive ability, or social support system  Outcome: Progressing     Problem: Knowledge Deficit  Goal: Patient/family/caregiver demonstrates understanding of disease process, treatment plan, medications, and discharge instructions  Description  Complete learning assessment and assess knowledge base    Interventions:  - Provide teaching at level of understanding  - Provide teaching via preferred learning methods  Outcome: Progressing

## 2020-02-21 NOTE — ASSESSMENT & PLAN NOTE
Lab Results   Component Value Date    HGBA1C 8 6 (H) 02/20/2020       Recent Labs     02/20/20  1855 02/20/20 2054 02/21/20  0615   POCGLU 355* 401* 194*       Blood Sugar Average: Last 72 hrs:  (P) 488 [id number]   A1c 8 6  Continue insulin  Monitor Accu-Cheks  Discussed diabetic diet   Needs outpatient follow-up   Avoid hypoglycemia

## 2020-02-21 NOTE — UTILIZATION REVIEW
Initial Clinical Review    Admission: Date/Time/Statement: Admission Orders (From admission, onward)     Ordered        02/20/20 1520  Place in Observation (expected length of stay for this patient is less than two midnights)  Once                   Orders Placed This Encounter   Procedures    Place in Observation (expected length of stay for this patient is less than two midnights)     Standing Status:   Standing     Number of Occurrences:   1     Order Specific Question:   Admitting Physician     Answer:   Dominique Roldan [[id number]]     Order Specific Question:   Level of Care     Answer:   Med Surg [[id number]]     ED Arrival Information     Expected Arrival Acuity Means of Arrival Escorted By Service Admission Type    - 2/20/2020 06:25 Emergent [facility] Hospitalist Emergency    Arrival Complaint    Flu Like Symptoms        Chief Complaint   Patient presents with    Chest Pain     pt coming from dialysis, complains of chest pain and sob for two days  Assessment/Plan: 49 yo f with a PMH of ESRD on HD, DM, foot ulcer legally blind, DVT on coumadin, HTN, Morbid obesity, Thyroid disease, Panic attacks, GERD  She presents to the ED with complaints of SOB with cough( green sputum )  and chest tightness, some issues with diarrhea  and vomiting, with lethargic and tired with a poor appetite, for the last few days  She missed one HD treatment and when she presented to day,  she was sent over from dialysis  for evaluation  On exam she has rales in the right lower field and left lower field  She was sent from the ED to  dialysis for a treatment, with amoxicillin given post treatment, On return to the ED  she continues to feel SOB and was admitted to observation status,for further evaluation and care, rule out flu  Nephrology consult - 2/20 -  Consulted  For assistance in the management of ESRD- HD treatment  On 2/20     Estimated dry weight 122 kilos -Aiming for UF close to 4 kilos today - Left arm fistula in place - Acid base and lytes stable except for mild hyperkalemia and hyponatremia with a slightly hemolized specimen- Renal diet     ED Triage Vitals   Temperature Pulse Respirations Blood Pressure SpO2   02/20/20 0634 02/20/20 0634 02/20/20 0634 02/20/20 0634 02/20/20 0634   98 6 °F (37 °C) 68 18 148/68 93 %      Temp Source Heart Rate Source Patient Position - Orthostatic VS BP Location FiO2 (%)   02/20/20 1045 02/20/20 0634 02/20/20 0634 02/20/20 0634 --   Oral Monitor Lying Right arm       Pain Score       02/20/20 0634       9        Wt Readings from Last 1 Encounters:   02/20/20 127 kg (280 lb)     Additional Vital Signs:   Date/Time  Temp  Pulse  Resp  BP  MAP (mmHg)  SpO2  O2 Device  Patient Position - Orthostatic VS   02/21/20 07:22:06  98 4 °F (36 9 °C)  --  18  118/68  85  --  --  --   02/21/20 0721  --  72  --  118/68  --  --  --  Sitting   02/20/20 2301  98 6 °F (37 °C)  82  18  143/63  --  92 %  None (Room air)  Lying   02/20/20 1912  --  --  --  --  --  95 %  --  --   02/20/20 1910  --  --  --  --  --  --  Nasal cannula  --   02/20/20 17:53:50  98 3 °F (36 8 °C)  83  18  146/70  95  100 %  --  --   02/20/20 1730  --  76  20  122/60  --  100 %  None (Room air)  --   02/20/20 1528  --  76  20  --  --  --  None (Room air)  Lying   02/20/20 1425  97 7 °F (36 5 °C)  74  20  114/33Abnormal   59  --  --  Lying   02/20/20 1400  --  85  18  117/60  --  --  --  --   02/20/20 1330  --  77  18  120/61  --  --  --  --   02/20/20 1300  --  80  18  98/53  --  --  --  --   02/20/20 1230  --  66  18  107/93  --  --  --  --   02/20/20 1200  --  71  18  154/76  --  --  --  --   02/20/20 1130  --  71  18  144/71  --  --  --  --   02/20/20 1100  --  67  18  118/82  --  --  --  --   02/20/20 1045  97 6 °F (36 4 °C)  66  20  162/70  --  --  --  --   02/20/20 1040  --  73  20  173/79Abnormal   --  --  --  Lying   02/20/20 0847  --  68  20  150/73  --  99 %  None (Room air)  --           Pertinent Labs/Diagnostic Test Results:   Results from last 7 days   Lab Units 02/20/20  0855   WBC Thousand/uL 8 83   HEMOGLOBIN g/dL 9 7*   HEMATOCRIT % 30 1*   PLATELETS Thousands/uL 198   NEUTROS ABS Thousands/µL 6 55     Results from last 7 days   Lab Units 02/20/20  0655   SODIUM mmol/L 133*   POTASSIUM mmol/L 6 0*   CHLORIDE mmol/L 100   CO2 mmol/L 22   ANION GAP mmol/L 11   BUN mg/dL 60*   CREATININE mg/dL 8 54*   EGFR ml/min/1 73sq m 5   CALCIUM mg/dL 8 6     Results from last 7 days   Lab Units 02/20/20  0655   AST U/L 31   ALT U/L 19   ALK PHOS U/L 120*   TOTAL PROTEIN g/dL 7 0   ALBUMIN g/dL 2 6*   TOTAL BILIRUBIN mg/dL 0 62     Results from last 7 days   Lab Units 02/21/20  0615 02/20/20  2054 02/20/20  1855   POC GLUCOSE mg/dl 194* 401* 355*     Results from last 7 days   Lab Units 02/20/20  0655   GLUCOSE RANDOM mg/dL 99     Results from last 7 days   Lab Units 02/20/20  0855   HEMOGLOBIN A1C % 8 6*   EAG mg/dl 200     Results from last 7 days   Lab Units 02/20/20  0655   TROPONIN I ng/mL <0 02     Results from last 7 days   Lab Units 02/20/20  1953   PROTIME seconds 16 4*   INR  1 37*     Results from last 7 days   Lab Units 02/20/20  1615   PROCALCITONIN ng/ml 0 19       Results from last 7 days   Lab Units 02/20/20  1857   INFLUENZA A PCR  None Detected   INFLUENZA B PCR  None Detected   RSV PCR  None Detected     CT Chest - 2/20 - Mild patchy groundglass opacities within the left lower lobe, nonspecific but could be infectious or inflammatory in etiology  Right lower lobe subsegmental atelectasis  Mild cardiomegaly  Atrophic native kidneys  Renal osteodystrophy  CXR - 2/20 - Persistent opacities in the left mid and lower lung zones suggesting left pleural effusion and questionable lingular infiltrate   Follow-up PA and lateral evaluation is recommended      ECG -2/20 -  NSR  No significant changes found       ED Treatment:   Medication Administration from 02/20/2020 0625 to 02/20/2020 4883 Date/Time Order Dose Route Action     02/20/2020 1002 acetaminophen (TYLENOL) tablet 975 mg 975 mg Oral Given     02/20/2020 1002 lidocaine (LIDODERM) 5 % patch 1 patch 1 patch Topical Medication Applied     02/20/2020 1104 doxercalciferol (HECTOROL) injection 1 mcg 1 mcg Intravenous Given     02/20/2020 1416 amoxicillin (AMOXIL) capsule 1,000 mg 1,000 mg Oral Given        Past Medical History:   Diagnosis Date    Abnormal uterine bleeding (AUB)     Anxiety     [facility]     Disease of thyroid gland     [facility]     End stage kidney disease ([facility] )     [facility] ulcer due to secondary DM ([facility] )     Hypertension     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[facility] )     right eye    Morbid obesity ([facility])     Panic attacks     Reflux esophagitis     Thrombophlebitis of saphenous vein     Warfarin anticoagulation      Present on Admission:   Type 2 diabetes mellitus ([facility])   Ambulatory dysfunction   Hyperkalemia      Admitting Diagnosis: Chest wall pain [R07 89]  Dyspnea [R06 00]  Atypical chest pain [R07 89]  Flu-like symptoms [R68 89]  Lingular pneumonia [J18 9]  Age/Sex: 48 y o  female  Admission Orders:  Scheduled Medications:    Medications:  atorvastatin 20 mg Oral QPM   b complex-vitamin C-folic acid 1 capsule Oral Daily   benzonatate 200 mg Oral TID   brimonidine 1 drop Both Eyes BID   Calcium Carbonate Antacid 1,250 mg Oral BID With Meals   carvedilol 25 mg Oral BID With Meals   cinacalcet 30 mg Oral Daily   diclofenac sodium 2 g Topical 4x Daily   docusate sodium 100 mg Oral BID   dorzolamide-timolol 1 drop Both Eyes BID   Ferric Citrate 1 tablet Oral Daily   gabapentin 300 mg Oral HS   guaiFENesin 600 mg Oral Q12H [facility]   insulin glargine 30 Units Subcutaneous HS   insulin lispro 1-5 Units Subcutaneous TID AC   latanoprost 1 drop Both Eyes HS   levalbuterol 1 25 mg Nebulization TID   levothyroxine 50 mcg Oral Early Morning   loratadine 10 mg Oral Daily   losartan 25 mg Oral Daily   melatonin 3 mg Oral HS   methazolamide 25 mg Oral TID   nystatin  Topical BID   pantoprazole 40 mg Oral Daily Before Breakfast   polyethylene glycol 17 g Oral Daily   prednisoLONE acetate 1 drop Both Eyes 4x Daily   sertraline 50 mg Oral Daily   sodium chloride 3 mL Nebulization TID   torsemide 200 mg Oral Q12H   warfarin 10 mg Oral Daily (warfarin)     Continuous IV Infusions:     PRN Meds:    acetaminophen 650 mg Oral Q6H PRN    ALPRAZolam 0 25 mg Oral BID PRN    doxercalciferol 1 mcg Intravenous PRN 2/20 x 1    lidocaine  Topical Before Dialysis    ondansetron 4 mg Intravenous Q6H PRN    traMADol 50 mg Oral Q8H PRN 2/20 x 1      Nursing Orders - VS - SCD's to le's-  Up with assistance -  Diet renal- fluid restriction 1800 ml      Network Utilization Review Department  [email or web address]  ATTENTION: Please call with any questions or concerns to [phone] and carefully listen to the prompts so that you are directed to the right person  All voicemails are confidential   Chuyita Alejo all requests for admission clinical reviews, approved or denied determinations and any other requests to dedicated fax number below belonging to the campus where the patient is receiving treatment   List of dedicated fax numbers for the Facilities:  [address] DENIALS (Administrative/Medical Necessity) [phone]   [address] (Maternity/NICU/Pediatrics) [phone]   Nabila Sanchez [phone]   Pebbles Arcos [phone]   Radha Polanco [phone]   Martinez Evans [phone]   [address] [phone]   [facility]  [phone]   [address] [facility] [address]  [address] [facility] [address] [facility] [phone]

## 2020-02-21 NOTE — RESPIRATORY THERAPY NOTE
RT Protocol Note  Yamileth Cliff 48 y o  female MRN: [id number]  Unit/Bed#: -01 Encounter: [id number]    Assessment    Principal Problem:    Flu-like symptoms  Active Problems:    History of DVT (deep vein thrombosis)    ESRD on hemodialysis ([facility])    Type 2 diabetes mellitus ([facility])    Ambulatory dysfunction    Hyperkalemia      Home Pulmonary Medications:  reviewed       Past Medical History:   Diagnosis Date    Abnormal uterine bleeding (AUB)     Anxiety     [facility]     Disease of thyroid gland     DVT (deep venous thrombosis) ([facility])     End stage kidney disease ([facility])     [facility] ulcer [facility] to secondary DM [facility] )     Hypertension     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[facility] )     right eye    Morbid obesity ([facility])     Panic attacks     Reflux esophagitis     Thrombophlebitis of saphenous vein     Warfarin anticoagulation      Social History     Socioeconomic History    Marital status: Single     Spouse name: None    Number of children: None    Years of education: None    Highest education level: None   Occupational History    None   Social Needs    Financial resource strain: None    Food insecurity:     Worry: None     Inability: None    Transportation needs:     Medical: None     Non-medical: None   Tobacco Use    Smoking status: Never Smoker    Smokeless tobacco: Never Used   Substance and Sexual Activity    Alcohol use: Not Currently     Alcohol/week: 0 0 standard drinks     Frequency: Never     Drinks per session: Patient refused     Binge frequency: Patient refused    Drug use: No    Sexual activity: Not Currently     Partners: Male     Birth control/protection: Abstinence   Lifestyle    Physical activity:     Days per week: None     Minutes per session: None    Stress: None   Relationships    Social connections:     Talks on phone: None     Gets together: None     Attends Buddhism service: None     Active member of club or organization: None     Attends meetings of clubs or organizations: None     Relationship status: None    Intimate partner violence:     Fear of current or ex partner: None     Emotionally abused: None     Physically abused: None     Forced sexual activity: None   Other Topics Concern    None   Social History Narrative    None       Subjective         Objective    Physical Exam:   Assessment Type: Assess only  General Appearance: Alert, Awake  Respiratory Pattern: Normal  Chest Assessment: Chest expansion symmetrical  Bilateral Breath Sounds: Diminished, Expiratory wheezes(slight expiratory wheeze)  R Breath Sounds: Diminished, Expiratory wheezes  L Breath Sounds: Diminished, Expiratory wheezes  Cough: Strong, Productive  O2 Device: (P) nasal cannula    Vitals:  Blood pressure 146/70, pulse 83, temperature 98 3 °F (36 8 °C), resp  rate 18, height 5' 6" (1 676 m), weight 127 kg (280 lb), last menstrual period 02/12/2016, SpO2 100 %, not currently breastfeeding  Imaging and other studies: I have personally reviewed pertinent reports  O2 Device: (P) nasal cannula     Plan    Respiratory Plan: Moderate/Severe Distress pathway        Resp Comments: (P) Pt assessed per respiratory protocol  Pt currenty not in acute resp distress, pattern shallow/unlabored, however complains of intermittent dyspnea at rest  Strong productive cough, with small green sputum noted  Utilizes prn inhalers at home  No pulmonary history per patient  Admitted for upper resp infection vs pneumonia  Rule out flu  Based on the physical assessment, medical history and current resp status, nebulizer therapy ordered TID  Will continue to monitor and assess per resp protocol

## 2020-02-21 NOTE — PROGRESS NOTES
Progress note- Nephrology   Amita Dominguez 48 y o  female MRN: [id number]  Unit/Bed#: MS [facility] Encounter: [id number]      ASSESSMENT/PLAN:  End-stage renal disease: On maintenance hemodialysis every TTS at [address]  -status post dialysis yesterday without issues  UF negative 4 2 liters  -post HD weight 122 8 kg-target weight 122  -encourage fluid restriction  -plan on hemodialysis in a m  Access:  Left AVF with positive bruit and thrill    Hypertension:  BP acceptable  -currently on Coreg 25 mg b i d , losartan 25 mg daily, torsemide 100 mg q 12 hours  -avoid variations in blood pressure  -will UF as blood pressure allows    Anemia likely Chronic Kidney Disease:  Hemoglobin stable at 9 7  -not on Epogen secondary to history of PE/DVT  -transfuse for hemoglobin less than 7 0    Bone mineral disease of Chronic Kidney Disease of renal origin   -hyperphosphatemia:  On Renagel 800 mg three tablets t i d  With meals and two tablets with snacks   -secondary hyperparathyroidism : On Hectorol 1 mcg with dialysis   -continue check PTH and phosphorus as outpatient    Hyperkalemia:  Specimen yesterday was slightly hemolysis eyes  -status post dialysis yesterday with one K bath last 2 hours  -recommend maintain low-potassium diet  -current BMP-pending  -will continue to monitor for now    Hyponatremia:  Suspect volume mediated  -will treat with dialysis  -recommend fluid restriction of 1500 mL per day    Chest pain:  Pleuritic in nature-improved per patient report  -given amoxicillin per ER physician  -per primary team      Disposition:  Patient stable from a dialysis standpoint  Okay from renal standpoint for discharge medically ready    SUBJECTIVE:    Patient seen and examined  Reports she was diagnosed with pneumonia and will be sent home on antibiotics and nebulizer    Denies chest pain, shortness of breath, nausea vomiting    OBJECTIVE:  Current Weight: Weight - Scale: 127 kg (280 lb)  Vitals:    02/20/20 1912 02/20/20 2301 02/21/20 0721 02/21/20 0722   BP:  143/63 118/68 118/68   BP Location:  Right arm Right arm    Pulse:  82 72    Resp:  18  18   Temp:  98 6 °F (37 °C)  98 4 °F (36 9 °C)   TempSrc:  Oral     SpO2: 95% 92%     Weight:       Height:           Intake/Output Summary (Last 24 hours) at 2/21/2020 1109  Last data filed at 2/21/2020 3052  Gross per 24 hour   Intake 1100 ml   Output 4200 ml   Net -3100 ml     General:  No acute distress, cooperative,   Skin:  Warm and dry without rash  ENMT:  Mucous membranes moist, sclera anicteric, tongue is midline  Neck:  Supple without JVD  Respiratory:  Fine few crackles bases left greater than right, otherwise clear to auscultation  Cardiac:  Regular rate and rhythm without rub  GI:  Soft, nontender, no distention, active bowel sounds  Extremities:  Trace lower extremity edema noted bilaterally    Left AV fistula positive bruit and thrill  Neuro:  Alert oriented and awake, no gross deficits  Psych:  Appropriate affect    Medications:    Current Facility-Administered Medications:     acetaminophen (TYLENOL) tablet 650 mg, 650 mg, Oral, Q6H PRN, Evelyne Woo MD    ALPRAZolam Rojean Million) tablet 0 25 mg, 0 25 mg, Oral, BID PRN, Evelyne Woo MD    atorvastatin (LIPITOR) tablet 20 mg, 20 mg, Oral, QPM, Evelyne Woo MD, 20 mg at 02/20/20 1954    b complex-vitamin C-folic acid (NEPHROCAPS) capsule 1 capsule, 1 capsule, Oral, Daily, Evelyne Woo MD, 1 capsule at 02/21/20 0825    benzonatate (TESSALON PERLES) capsule 200 mg, 200 mg, Oral, TID, Evelyne Woo MD, 200 mg at 02/21/20 0826    brimonidine (ALPHAGAN P) 0 15 % ophthalmic solution 1 drop, 1 drop, Both Eyes, BID, Evelyne Woo MD, 1 drop at 02/21/20 0940    Calcium Carbonate Antacid oral suspension 1,250 mg, 1,250 mg, Oral, BID With Meals, Evelyne Woo MD, 1,250 mg at 02/21/20 0725    carvedilol (COREG) tablet 25 mg, 25 mg, Oral, BID With Meals, Josué Thao MD, 25 mg at 02/21/20 0725    cinacalcet (SENSIPAR) tablet 30 mg, 30 mg, Oral, Daily, Josué Thao MD, 30 mg at 02/21/20 0827    diclofenac sodium (VOLTAREN) 1 % topical gel 2 g, 2 g, Topical, 4x Daily, Josué Thao MD, 2 g at 02/21/20 1107    docusate sodium (COLACE) capsule 100 mg, 100 mg, Oral, BID, Josué Thao MD, 100 mg at 02/20/20 1955    dorzolamide-timolol (COSOPT) 22 3-6 8 MG/ML ophthalmic solution 1 drop, 1 drop, Both Eyes, BID, Josué Thao MD, 1 drop at 02/21/20 0827    doxercalciferol (HECTOROL) injection 1 mcg, 1 mcg, Intravenous, PRN, Josué Thao MD, 1 mcg at 02/20/20 1104    Ferric Citrate TABS 1 tablet, 1 tablet, Oral, Daily, Josué Thao MD    gabapentin (NEURONTIN) capsule 300 mg, 300 mg, Oral, HS, Josué Thao MD, 300 mg at 02/20/20 2157    guaiFENesin (MUCINEX) 12 hr tablet 600 mg, 600 mg, Oral, Q12H [address], Josué Thao MD, 600 mg at 02/21/20 0826    heparin (porcine) subcutaneous injection 5,000 Units, 5,000 Units, Subcutaneous, Q8H [address], LOIS Hadley, 5,000 Units at 02/21/20 0940    insulin glargine (LANTUS) subcutaneous injection 30 Units 0 3 mL, 30 Units, Subcutaneous, HS, Josué Thao MD, 30 Units at 02/20/20 2158    insulin lispro (HumaLOG) 100 units/mL subcutaneous injection 1-5 Units, 1-5 Units, Subcutaneous, TID AC, 2 Units at 02/21/20 1107 **AND** Fingerstick Glucose (POCT), , , TID AC, Josué Thao MD    latanoprost (XALATAN) 0 005 % ophthalmic solution 1 drop, 1 drop, Both Eyes, HS, Josué Thao MD, 1 drop at 02/20/20 2158    levalbuterol [facility]) inhalation solution 1 25 mg, 1 25 mg, Nebulization, TID, Willian Pillai MD, 1 25 mg at 02/20/20 1912    levothyroxine tablet 50 mcg, 50 mcg, Oral, Early Morning, Josué Thao MD, 50 mcg at 02/21/20 2996    lidocaine (LMX) 4 % cream, , Topical, Before Dialysis, Josué Thao, MD    loratadine (CLARITIN) tablet 10 mg, 10 mg, Oral, Daily, Nick Mayberry MD, 10 mg at 02/21/20 0825    losartan (COZAAR) tablet 25 mg, 25 mg, Oral, Daily, Nick Mayberry MD, 25 mg at 02/21/20 1266    melatonin tablet 3 mg, 3 mg, Oral, HS, Nick Mayberry MD, 3 mg at 02/20/20 2157    methazolamide (NEPTAZANE) tablet 25 mg, 25 mg, Oral, TID, Nick Mayberry MD    nystatin (MYCOSTATIN) powder, , Topical, BID, Nick Mayberry MD    ondansetron [facility]) injection 4 mg, 4 mg, Intravenous, Q6H PRN, Nick Mayberry MD    pantoprazole (PROTONIX) EC tablet 40 mg, 40 mg, Oral, Daily Before Breakfast, Nick Mayberry MD, 40 mg at 02/21/20 9581    polyethylene glycol (MIRALAX) packet 17 g, 17 g, Oral, Daily, Nick Mayberry MD    prednisoLONE acetate (PRED FORTE) 1 % ophthalmic suspension 1 drop, 1 drop, Both Eyes, 4x Daily, Nick Mayberry MD, 1 drop at 02/21/20 1107    sertraline (ZOLOFT) tablet 50 mg, 50 mg, Oral, Daily, Nick Mayberry MD, 50 mg at 02/21/20 6778    sodium chloride 0 9 % inhalation solution 3 mL, 3 mL, Nebulization, TID, Lashell Feliz MD, 3 mL at 02/20/20 1912    torsemide (DEMADEX) tablet 200 mg, 200 mg, Oral, Q12H, Nick Mayberry MD, 200 mg at 02/21/20 0825    traMADol (ULTRAM) tablet 50 mg, 50 mg, Oral, Q8H PRN, Nick Mayberry MD, 50 mg at 02/21/20 0946    warfarin (COUMADIN) tablet 10 mg, 10 mg, Oral, Daily (warfarin), Nick Mayberry MD, 10 mg at 02/20/20 2157    Laboratory Results:  Results from last 7 days   Lab Units 02/20/20  0855 02/20/20  0655   WBC Thousand/uL 8 83  --    HEMOGLOBIN g/dL 9 7*  --    HEMATOCRIT % 30 1*  --    PLATELETS Thousands/uL 198  --    SODIUM mmol/L  --  133*   POTASSIUM mmol/L  --  6 0*   CHLORIDE mmol/L  --  100   CO2 mmol/L  --  22   BUN mg/dL  --  60*   CREATININE mg/dL  --  8 54*   CALCIUM mg/dL  --  8 6

## 2020-02-23 LAB
BACTERIA SPT RESP CULT: ABNORMAL
BACTERIA SPT RESP CULT: ABNORMAL
GRAM STN SPEC: ABNORMAL

## 2020-02-24 ENCOUNTER — APPOINTMENT (OUTPATIENT)
Dept: PHYSICAL THERAPY | Facility: REHABILITATION | Age: 55
End: 2020-02-24
Payer: MEDICARE

## 2020-02-26 ENCOUNTER — APPOINTMENT (OUTPATIENT)
Dept: PHYSICAL THERAPY | Facility: REHABILITATION | Age: 55
End: 2020-02-26
Payer: MEDICARE

## 2020-02-27 ENCOUNTER — APPOINTMENT (OUTPATIENT)
Dept: PHYSICAL THERAPY | Facility: REHABILITATION | Age: 55
End: 2020-02-27
Payer: MEDICARE

## 2020-03-02 ENCOUNTER — APPOINTMENT (OUTPATIENT)
Dept: LAB | Facility: CLINIC | Age: 55
End: 2020-03-02
Payer: MEDICARE

## 2020-03-02 DIAGNOSIS — Z86.711 PERSONAL HISTORY OF PE (PULMONARY EMBOLISM): ICD-10-CM

## 2020-03-02 DIAGNOSIS — Z86.718 HISTORY OF DVT (DEEP VEIN THROMBOSIS): ICD-10-CM

## 2020-03-09 ENCOUNTER — OFFICE VISIT (OUTPATIENT)
Dept: OBGYN CLINIC | Facility: HOSPITAL | Age: 55
End: 2020-03-09
Payer: MEDICARE

## 2020-03-09 ENCOUNTER — HOSPITAL ENCOUNTER (OUTPATIENT)
Dept: RADIOLOGY | Facility: HOSPITAL | Age: 55
Discharge: HOME | End: 2020-03-09
Payer: MEDICARE

## 2020-03-09 VITALS
SYSTOLIC BLOOD PRESSURE: 106 MMHG | BODY MASS INDEX: 45 KG/M2 | HEIGHT: 66 IN | HEART RATE: 71 BPM | DIASTOLIC BLOOD PRESSURE: 73 MMHG | WEIGHT: 280 LBS

## 2020-03-09 DIAGNOSIS — M17.0 OSTEOARTHRITIS OF BOTH KNEES, UNSPECIFIED OSTEOARTHRITIS TYPE: ICD-10-CM

## 2020-03-09 DIAGNOSIS — M25.562 ACUTE PAIN OF LEFT KNEE: ICD-10-CM

## 2020-03-09 DIAGNOSIS — M25.562 ACUTE PAIN OF LEFT KNEE: Primary | ICD-10-CM

## 2020-03-09 DIAGNOSIS — M25.561 ACUTE PAIN OF RIGHT KNEE: ICD-10-CM

## 2020-03-09 PROCEDURE — 73562 X-RAY EXAM OF KNEE 3: CPT

## 2020-03-09 PROCEDURE — 99213 OFFICE O/P EST LOW 20 MIN: CPT | Performed by: PHYSICIAN ASSISTANT

## 2020-03-09 NOTE — PROGRESS NOTES
Assessment/Plan   Diagnoses and all orders for this visit:    Acute pain of left knee    Acute pain of right knee    Osteoarthritis of both knees, unspecified osteoarthritis type    Right tibial plateau slcerosis and cyst formation  - suspect insufficiency fracture  - MRI  - Lateral  brace  - Protected weightbearing with Cane (refused crutches or walker)  - Follow up with Dr Mario Connolly / Zahraa Kathleen / Baldev Yao / Raghav Banks   Patient ID: Teresa Arteaga is a 50 y o  female  Vitals:    20 1114   BP: 106/73   Pulse: 70     56yo female comes in for an evaluation of her b/l knees  She has been having ongoing right >> left knee pain with no specific injury  Among other comorbidities, she has a history of osteoarthritis and of hyperparathyroidism  The pain is dull in character, mild in severity, pain does not radiate and is not associated with numbness  The pain increases with weightbearing          The following portions of the patient's history were reviewed and updated as appropriate: allergies, current medications, past family history, past medical history, past social history, past surgical history and problem list     Review of Systems  Ortho Exam  Past Medical History:   Diagnosis Date    Abnormal uterine bleeding (AUB)     Anxiety     Diabetes mellitus ([address] )     Disease of thyroid gland     [facility]     End stage kidney disease ([address] )     Foot ulcer due to secondary DM ([address] )     Hypertension     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[address] )     right eye    Morbid obesity ([address] )     Panic attacks     Reflux esophagitis     Thrombophlebitis of saphenous vein     Warfarin anticoagulation      Past Surgical History:   Procedure Laterality Date    ARTERIOVENOUS GRAFT PLACEMENT      CERVICAL BIOPSY  W/ LOOP ELECTRODE EXCISION       SECTION      DIALYSIS FISTULA CREATION      DILATION AND CURETTAGE OF UTERUS      ENDOMETRIAL ABLATION W/ NOVASURE      EYE SURGERY      HYSTERECTOMY      @ age 57 (complete)    IR FISTULA/GRAFTOGRAM  10/11/2019    OOPHORECTOMY Bilateral     @ age 57    PERICARDIUM SURGERY      OH COLONOSCOPY FLX DX W/COLLJ SPEC WHEN PFRMD N/A 3/13/2019    Procedure: COLONOSCOPY;  Surgeon: Jade Mirza MD;  [facility]; Service: Gastroenterology    OH ESOPHAGOGASTRODUODENOSCOPY TRANSORAL DIAGNOSTIC N/A 3/13/2019    Procedure: ESOPHAGOGASTRODUODENOSCOPY (EGD); Surgeon: Jade Mirza MD;  [facility]; Service: Gastroenterology    OH LAPAROSCOPY W TOT HYSTERECT UTERUS 250 GRAM OR LESS N/A 2/16/2016    Procedure: HYSTERECTOMY LAPAROSCOPIC TOTAL [facility]), with bilateral salpingectomy;  Surgeon: Robyn Kimble DO;  [facility];  Service: Gynecology    THROMBECTOMY / ARTERIOVENOUS GRAFT REVISION      TOE SURGERY Right     removal of the 4th toe     Family History   Problem Relation Age of Onset    Heart disease Family     Diabetes Family     Heart disease Mother     Cancer Brother         neck    Throat cancer Brother     Ovarian cancer Maternal Aunt 36     Social History     Occupational History    Not on file   Tobacco Use    Smoking status: Never Smoker    Smokeless tobacco: Never Used   Substance and Sexual Activity    Alcohol use: Never     Alcohol/week: 0 0 standard drinks     Frequency: Never     Drinks per session: Patient refused     Binge frequency: Patient refused    Drug use: No    Sexual activity: Not Currently     Partners: Male     Birth control/protection: Abstinence       Review of Systems   Constitutional: Negative  HENT: Negative  Eyes: Negative  Respiratory: Negative  Cardiovascular: Negative  Gastrointestinal: Negative  Endocrine: Negative  Genitourinary: Negative  Musculoskeletal: As below      Allergic/Immunologic: Negative  Neurological: Negative  Hematological: Negative  Psychiatric/Behavioral: Negative          Objective   Physical Exam    · Constitutional: Awake, Alert, Oriented  · Eyes: EOMI  · Psych: Mood and affect appropriate  · Heart: regular rate and rhythm  · Lungs: No audible wheezing  · Abdomen: soft  · Lymph: no lymphedema   right Knee:  - Appearance   No swelling, discoloration, deformity, or ecchymosis  - Effusion   trace  - Palpation   + Tenderness lateral joint line  and + Tenderness lateral tibial plateau  - ROM   Extension: 0 and Flexion: 90  - Special Tests   Lachman's Test negative, Anterior Drawer Test negative, Posterior Drawer Test negative, Varus Stress Test negative and Valgus stress increases pain, but there is no laxity  - Motor   normal 5/5 in all planes  - NVI distally       left knee:  - Appearance   No swelling, ecchymosis, erythema, or rash  - Tenderness   No tenderness about the med/lat joint lines, med/lat tibial plateaus, or patella  - Effusion   None  - ROM   Full and pain-free active rom   - Special Tests    No laxity with valgus, varus, Lachman, ant drawer, or posterior drawer testing  No patellar apprehension  No Aruns  - Motor 5/5 in all planes  - NVI distally      I have personally reviewed pertinent films in PACS and my interpretation is b/l lateral compartment narrowing  On the right knee, there is sclerosis and cyst formation of the lateral tibial plateau

## 2020-03-11 ENCOUNTER — OFFICE VISIT (OUTPATIENT)
Dept: PODIATRY | Facility: CLINIC | Age: 55
End: 2020-03-11
Payer: MEDICARE

## 2020-03-11 VITALS
SYSTOLIC BLOOD PRESSURE: 116 MMHG | WEIGHT: 279.8 LBS | HEIGHT: 66 IN | BODY MASS INDEX: 44.97 KG/M2 | DIASTOLIC BLOOD PRESSURE: 82 MMHG | HEART RATE: 69 BPM

## 2020-03-11 DIAGNOSIS — Z99.2 ESRD ON HEMODIALYSIS (HCC): Chronic | ICD-10-CM

## 2020-03-11 DIAGNOSIS — B35.1 DERMATOPHYTOSIS OF NAIL: ICD-10-CM

## 2020-03-11 DIAGNOSIS — N18.6 ESRD ON HEMODIALYSIS (HCC): Chronic | ICD-10-CM

## 2020-03-11 DIAGNOSIS — Z89.421 HISTORY OF AMPUTATION OF LESSER TOE OF RIGHT FOOT (HCC): ICD-10-CM

## 2020-03-11 DIAGNOSIS — E11.42 DIABETIC POLYNEUROPATHY ASSOCIATED WITH TYPE 2 DIABETES MELLITUS (HCC): ICD-10-CM

## 2020-03-11 DIAGNOSIS — L84 CORNS AND CALLOSITIES: ICD-10-CM

## 2020-03-11 DIAGNOSIS — L97.522 DIABETIC ULCER OF TOE OF LEFT FOOT ASSOCIATED WITH TYPE 2 DIABETES MELLITUS, WITH FAT LAYER EXPOSED (HCC): ICD-10-CM

## 2020-03-11 DIAGNOSIS — E11.621 DIABETIC ULCER OF TOE OF LEFT FOOT ASSOCIATED WITH TYPE 2 DIABETES MELLITUS, WITH FAT LAYER EXPOSED (HCC): ICD-10-CM

## 2020-03-11 DIAGNOSIS — E11.9 ENCOUNTER FOR DIABETIC FOOT EXAM (HCC): Primary | ICD-10-CM

## 2020-03-11 PROCEDURE — 11721 DEBRIDE NAIL 6 OR MORE: CPT | Performed by: PODIATRIST

## 2020-03-11 PROCEDURE — 11055 PARING/CUTG B9 HYPRKER LES 1: CPT | Performed by: PODIATRIST

## 2020-03-11 PROCEDURE — 99213 OFFICE O/P EST LOW 20 MIN: CPT | Performed by: PODIATRIST

## 2020-03-11 RX ORDER — UREA/ALLANTOIN/VIT E ACETATE 25 %
CREAM (GRAM) TOPICAL ONCE
Qty: 112 G | Refills: 2 | Status: SHIPPED | OUTPATIENT
Start: 2020-03-11 | End: 2021-03-31 | Stop reason: SDUPTHER

## 2020-03-11 NOTE — PATIENT INSTRUCTIONS
Foot Care for People with Diabetes   WHAT YOU NEED TO KNOW:   · Foot care helps protect your feet and prevent foot ulcers or sores  Long-term high blood sugar levels can damage the blood vessels and nerves in your legs and feet  This damage makes it hard to feel pressure, pain, temperature, and touch  You may not be able to feel a cut or sore, or shoes that are too tight  Foot care is needed to prevent serious problems, such as an infection or amputation  · Diabetes may cause your toes to become crooked or curved under  These changes may affect the way you walk and can lead to increased pressure on your foot  The pressure can decrease blood flow to your feet  Lack of blood flow increases your risk for a foot ulcer  Do not ignore small problems, such as dry skin or small wounds  These can become life-threatening over time without proper care  DISCHARGE INSTRUCTIONS:   Contact your healthcare provider if:   · Your feet become numb, weak, or hard to move  · You have pus draining from a sore on your foot  · You have a wound on your foot that gets bigger, deeper, or does not heal      · You see blisters, cuts, scratches, calluses, or sores on your foot  · You have a fever, and your feet become red, warm, and swollen  · Your toenails become thick, curled, or yellow  · You find it hard to check your feet because your vision is poor  · You have questions or concerns about your condition or care  Foot care:   · Check your feet each day  Look at your whole foot, including the bottom, and between and under your toes  Check for wounds, corns, and calluses  Use a mirror to see the bottom of your feet  The skin on your feet may be shiny, tight, or darker than normal  Your feet may also be cold and pale  Feel your feet by running your hands along the tops, bottoms, sides, and between your toes  Redness, swelling, and warmth are signs of blood flow problems that can lead to a foot ulcer   Do not try to remove corns or calluses yourself  · Wash your feet each day with soap and warm water  Do not use hot water, because this can injure your foot  Dry your feet gently with a towel after you wash them  Dry between and under your toes  · Apply lotion or a moisturizer on your dry feet  Ask your healthcare provider what lotions are best to use  Do not put lotion or moisturizer between your toes  · Cut your toenails correctly  File or cut your toenails straight across  Use a soft brush to clean around your toenails  If your toenails are very thick, you may need to have a healthcare provider or specialist cut them  · Protect your feet  Do not walk barefoot or wear your shoes without socks  Check your shoes for rocks or other objects that can hurt your feet  Wear cotton socks to help keep your feet dry  Wear socks without toe seams, or wear them with the seams inside out  Change your socks each day  Do not wear socks that are dirty or damp  · Wear shoes that fit well  Wear shoes that do not rub against any area of your feet  Your shoes should be ½ to ¾ inch (1 to 2 centimeters) longer than your feet  Your shoes should also have extra space around the widest part of your feet  Walking or athletic shoes with laces or straps that adjust are best  Ask your healthcare provider for help to choose shoes that fit you best  Ask him if you need to wear an insert, orthotic, or bandage on your feet  Follow up with your healthcare provider or foot specialist as directed: You will need to have your feet checked at least once a year  You may need a foot exam more often if you have nerve damage, foot deformities, or ulcers  Write down your questions so you remember to ask them during your visits  © 2017 [address] Information is for End User's use only and may not be sold, redistributed or otherwise used for commercial purposes   All illustrations and images included in CareNotes® are the copyrighted property of [employer]  or Warren Weinstein  The above information is an  only  It is not intended as medical advice for individual conditions or treatments  Talk to your doctor, nurse or pharmacist before following any medical regimen to see if it is safe and effective for you

## 2020-03-11 NOTE — PROGRESS NOTES
This patient was seen on 3/11/20  Assessment:    Problem List Items Addressed This Visit        Endocrine    Diabetic foot ulcer ([place] [address] )    Diabetic polyneuropathy associated with type 2 diabetes mellitus ([place] [address] )    Relevant Orders    Lesion Destruction       Musculoskeletal and Integument    Dermatophytosis of nail       Genitourinary    ESRD on hemodialysis ([place] [address] ) (Chronic)       Other    Encounter for diabetic foot exam ([place] [address] ) - Primary    Corns and callosities    Relevant Medications    Podiatric Products (FLEXITOL HEEL BALM) OINT    Other Relevant Orders    Lesion Destruction    History of amputation of lesser toe of right foot ([place] [address] )          Plan:  1  High risk  foot precautions reviewed with patient including the need to wear protective shoegear at all times when walking including in the home, the need to check feet all surfaces daily with a mirror and report and skin breaks to podiatry, the need to apply an emollient to skin of feet daily  2  Diabetic foot risk assessment performed  3  Noted small blister (non-infected) under the keratosis Right 5th toe; recommended topical antibiotic ointment and DSD daily and follow-up check with me in 3 weeks  Signs and symptoms of infection reviewed with patient and she is to call if noted  4  A1C from mid-February reviewed noting 8 6%  Discussed strategies to reduce caloric intake and also recommended she consult with  at gym for NWB exercises to burn calories  5  Nails x 8 were debrided with double-action nail forceps of their thickness, length and width    6  Callous bilateral heel and Right 5th toe removed with scalpel  Lesion Destruction  Date/Time: 3/11/2020 10:14 AM  Performed by: Emir Kimble DPM  Authorized by: Emir Kimble DPM     Procedure Details - Lesion Destruction:     Number of Lesions:  3  Lesion 1:     Body area:  Lower extremity    Lower extremity location:  R foot    Malignancy: benign hyperkeratotic lesion      Destruction method: scissors used for extraction    Lesion 2:     Body area:  Lower extremity    Lower extremity location:  L foot    Malignancy: benign hyperkeratotic lesion      Destruction method: scissors used for extraction    Lesion 3:     Body area:  Lower extremity    Lower extremity location:  R little toe    Malignancy: benign hyperkeratotic lesion      Destruction method: scissors used for extraction      7  Rx given for urea-based cream to help reduce hyperkeratosis       Diagnoses and all orders for this visit:    Encounter for diabetic foot exam ([facility] [address] )    Corns and callosities  -     Podiatric Products ([address]) Ramirez Sw[place]ons; Apply topically once for 1 dose  -     Lesion Destruction    Diabetic ulcer of toe of left foot associated with type 2 diabetes mellitus, with fat layer exposed ([facility] [address] )    ESRD on hemodialysis ([facility] [address] )    History of amputation of lesser toe of right foot ([facility])    Diabetic polyneuropathy associated with type 2 diabetes mellitus ([facility])  -     Lesion Destruction    Dermatophytosis of nail          Subjective:      Patient ID: Rella Hodgkins is a 50 y o  female  Del Narrow is here for a high risk foot appointment  She recently healed a diabetic heel ulcer  She currently states she consistently uses protective shoes  She doesn't know her A1C  She's on dialysis  She has a history of prior Right foot 4th toe amputation  She has neuropathy  The following portions of the patient's history were reviewed and updated as appropriate: allergies, current medications, past family history, past medical history, past social history, past surgical history and problem list     Review of Systems   Constitutional: Positive for activity change  Negative for appetite change, chills, diaphoresis, fatigue, fever and unexpected weight change  Respiratory: Negative  Gastrointestinal: Negative  Musculoskeletal: Positive for arthralgias, gait problem and joint swelling  Neurological: Positive for numbness  Psychiatric/Behavioral: Negative  Objective:      /82   Pulse 69   Ht 5' 6" (1 676 m) Comment: per pt, refused to get height  Wt 127 kg (279 lb 12 8 oz)   LMP 02/12/2016 (Exact Date)   BMI 45 16 kg/m²          Physical Exam   Constitutional: She is oriented to person, place, and time  She appears well-developed and well-nourished  No distress  HENT:   Head: Normocephalic  Eyes: Pupils are equal, round, and reactive to light  Neck: Normal range of motion  Cardiovascular: Pulses are no weak pulses  Pulses:       Dorsalis pedis pulses are 2+ on the right side, and 2+ on the left side  Posterior tibial pulses are 2+ on the right side, and 2+ on the left side  Pulmonary/Chest: Effort normal and breath sounds normal    Abdominal: Soft  Bowel sounds are normal    Musculoskeletal: She exhibits deformity  She has a brace on her Right knee  She has a missing 4th toe Right  Feet:   Right Foot: amputated  Skin Integrity: Positive for callus  Left Foot:   Skin Integrity: Positive for callus  Negative for ulcer, skin breakdown, erythema, warmth or dry skin  Neurological: She is alert and oriented to person, place, and time  A sensory deficit is present  Skin: Capillary refill takes less than 2 seconds  She is not diaphoretic  Nails yellow-brown, 5mm thick, dystrophic, with crumbling subugunal debris x 9  Callous noted bilateral posterior heels  Callous noted lateral Right 5th toe  Psychiatric: She has a normal mood and affect  Nursing note and vitals reviewed  I note a small   3cm diameter shallow non-infected blister under the callous Right 5th toe  Diabetic Foot Exam    Patient's shoes and socks removed  Right Foot/Ankle   Right Foot Inspection  Skin Exam: callus and callus                        Amputation: amputation right foot   Toe Exam: ROM and strength within normal limits  Sensory   Vibration: diminished  Proprioception: diminished Monofilament testing: diminished  Vascular  Capillary refills: < 3 seconds  The right DP pulse is 2+  The right PT pulse is 2+  Left Foot/Ankle  Left Foot Inspection  Skin Exam: skin normal, skin intact and callusno dry skin, no warmth, no erythema, no maceration, normal color, no pre-ulcer and no ulcer                         Toe Exam: ROM and strength within normal limits                   Sensory   Vibration: diminished  Proprioception: diminished  Monofilament: diminished  Vascular  Capillary refills: < 3 seconds  The left DP pulse is 2+  The left PT pulse is 2+  Assign Risk Category:  Deformity present; Loss of protective sensation;  No weak pulses       Risk: 3

## 2020-03-12 PROBLEM — E66.01 MORBID OBESITY (HCC): Status: ACTIVE | Noted: 2020-03-12

## 2020-03-12 PROBLEM — E78.5 HYPERLIPEMIA: Status: ACTIVE | Noted: 2020-03-12

## 2020-03-16 ENCOUNTER — HOSPITAL ENCOUNTER (OUTPATIENT)
Dept: RADIOLOGY | Facility: HOSPITAL | Age: 55
Discharge: HOME | End: 2020-03-16

## 2020-03-16 DIAGNOSIS — M17.0 OSTEOARTHRITIS OF BOTH KNEES, UNSPECIFIED OSTEOARTHRITIS TYPE: ICD-10-CM

## 2020-03-16 DIAGNOSIS — M25.562 ACUTE PAIN OF LEFT KNEE: ICD-10-CM

## 2020-03-16 DIAGNOSIS — M25.561 ACUTE PAIN OF RIGHT KNEE: ICD-10-CM

## 2020-03-17 ENCOUNTER — TELEPHONE (OUTPATIENT)
Dept: OBGYN CLINIC | Facility: HOSPITAL | Age: 55
End: 2020-03-17

## 2020-03-17 NOTE — TELEPHONE ENCOUNTER
Patient saw Evan David on 03/09  She states that she was given a script for a knee brace  She was told to come pick it up in the office but has not received a call  She is asking when she can  the brace?

## 2020-03-25 ENCOUNTER — TELEPHONE (OUTPATIENT)
Dept: OBGYN CLINIC | Facility: CLINIC | Age: 55
End: 2020-03-25

## 2020-03-30 ENCOUNTER — TELEPHONE (OUTPATIENT)
Dept: OBGYN CLINIC | Facility: CLINIC | Age: 55
End: 2020-03-30

## 2020-03-30 DIAGNOSIS — M25.561 ACUTE PAIN OF RIGHT KNEE: Primary | ICD-10-CM

## 2020-03-30 NOTE — TELEPHONE ENCOUNTER
Spoke to patient  She stated she is very upset that she has a fx and no one told her she has a fx and she was not given crutches in the office  Patient stated she was not given a brace in the office either  Patient stated she needs pain medication because she cannot walk without pain  Patient stated she has been put off for the MRI due to COVID 19 and cannot get it done until June or July now  Patient was given the information for her crutches ([facility]) but needs me to call her back because she was in the shower and could not write down the information given to her to get her crutches  Please advise if we can call pain medication in for her as her PCP refuses to fill now that she is out of her 2 week supply

## 2020-03-30 NOTE — TELEPHONE ENCOUNTER
I put in order for crutches  However, I noticed looking at pt's chart Daniel Luna was concerned for insufficiency fx and ordered and MRI but it does not look like this was done  Did pt have this done somewhere else?  (Please send back to Allison christensen)

## 2020-03-30 NOTE — TELEPHONE ENCOUNTER
Please fax the script to [facility] as I do not have the capability to do so working remotely  Please be advised, patient is very upset and would like a call back ASAP regarding pain meds and script being faxed       Thank you

## 2020-03-30 NOTE — TELEPHONE ENCOUNTER
Tess Salmon called in  She said that her PCP called her today - in regards to her left knee  She was told by her PCP that she needed to contact the ortho office to obtain crutches as she has a fracture and she should not be walking on that leg  She is calling in to get crutches as well as pain medication  She said she was prescribed Tramadol two weeks ago by her PCP, however, they will not give her any more, that she needs to reach out to Ortho

## 2020-03-31 ENCOUNTER — TELEPHONE (OUTPATIENT)
Dept: OBGYN CLINIC | Facility: HOSPITAL | Age: 55
End: 2020-03-31

## 2020-03-31 DIAGNOSIS — M17.11 PRIMARY OSTEOARTHRITIS OF RIGHT KNEE: ICD-10-CM

## 2020-03-31 DIAGNOSIS — Z86.59 HISTORY OF CLAUSTROPHOBIA: Primary | ICD-10-CM

## 2020-03-31 RX ORDER — LORAZEPAM 1 MG/1
1 TABLET ORAL
Qty: 1 TABLET | Refills: 0 | Status: SHIPPED | OUTPATIENT
Start: 2020-03-31 | End: 2021-06-11 | Stop reason: HOSPADM

## 2020-03-31 NOTE — TELEPHONE ENCOUNTER
There is a lot going on in this task so I will address them all    #1: patient does not have a known fracture  MRI was ordered to rule out occult or insufficiency fracture  #2: MRI is scheduled for Tomorrow at [address] [place] [address] - I hope she knows this  #3: Tylenol is recommended for knee pain  We do not prescribe narcotics (which is what tramadol is)  Ice is helpful as well  Elevation is recommended  Rest and support with crutches  #4: Cl called her last week and spoke with her  Brace was ordered and in the office  Patient was supposed to come in to get last week but her visit was canceled  Suzie said she can meet her tomorrow for the brace - they will call her  #5: Crutches are in the office  This can be picked up at [address], [place], [place], [place]    The prescription was also sent to [facility] so she can  at their office as well and that's on [facility] [address]

## 2020-03-31 NOTE — TELEPHONE ENCOUNTER
Brace fitter already called her today to set up brace fitting  She can get crutches today or tomorrow when she gets fitted for the brace  Appointment was canceled because the appointment was for MRI review and she did not have her MRI yet

## 2020-03-31 NOTE — TELEPHONE ENCOUNTER
Pt  Was advised and verbalized that it was only yesterday that someone called her and told her she needed to be uaing crutches  Also, pt  Stated she was not the one who canceled the appt  For the brace and the appt  Was canceled 2 times   Pt  Stated she feels frustrated in this whole matter and the fact that she has been walking on this is probably why she is in so much pain   Pt  Stated she feels that no one is showing any concern   I also made pt  Aware of her MRI tomorrow, pt  Was not aware

## 2020-04-07 ENCOUNTER — OFFICE VISIT (OUTPATIENT)
Dept: PODIATRY | Facility: CLINIC | Age: 55
End: 2020-04-07
Payer: MEDICARE

## 2020-04-07 VITALS — WEIGHT: 274 LBS | HEIGHT: 66 IN | BODY MASS INDEX: 44.03 KG/M2

## 2020-04-07 DIAGNOSIS — L97.512 RIGHT FOOT ULCER, WITH FAT LAYER EXPOSED (HCC): Primary | ICD-10-CM

## 2020-04-07 DIAGNOSIS — E11.621 DIABETIC ULCER OF TOE OF RIGHT FOOT ASSOCIATED WITH TYPE 2 DIABETES MELLITUS, WITH FAT LAYER EXPOSED (HCC): ICD-10-CM

## 2020-04-07 DIAGNOSIS — L97.512 DIABETIC ULCER OF TOE OF RIGHT FOOT ASSOCIATED WITH TYPE 2 DIABETES MELLITUS, WITH FAT LAYER EXPOSED (HCC): ICD-10-CM

## 2020-04-07 PROCEDURE — 11042 DBRDMT SUBQ TIS 1ST 20SQCM/<: CPT | Performed by: PODIATRIST

## 2020-04-07 RX ORDER — CEPHALEXIN 500 MG/1
500 CAPSULE ORAL EVERY 8 HOURS SCHEDULED
Qty: 21 CAPSULE | Refills: 0 | Status: SHIPPED | OUTPATIENT
Start: 2020-04-07 | End: 2020-04-14

## 2020-04-08 NOTE — ED PROVIDER NOTES
History  Chief Complaint   Patient presents with    Chest Pain     pt coming from dialysis, complains of chest pain and sob for two days  24-year-old female history of hypertension, type 2 diabetes, morbid obesity, end-stage renal disease on dialysis Tuesday Thursday Saturday, anxiety, panic attacks presents with chest wall pain  Patient has had intermittent chest wall pain for the past 2 days, went to dialysis but was instructed to go to the emergency department  She did not receive her scheduled dialysis  She denies exacerbating or relieving factors, associated symptoms any recent heavy lifting, trauma or recent illness  The pain is reproduced with palpation  Denies abdominal pain, shortness of breath, fever, chills, nausea, vomiting, diarrhea, urinary symptoms, rash, recent illness, travel, known sick contacts  Prior to Admission Medications   Prescriptions Last Dose Informant Patient Reported? Taking? ALPRAZolam (XANAX) 0 25 mg tablet 2/20/2020 at Unknown time Self Yes Yes   Sig: Take 0 25 mg by mouth 2 (two) times a day as needed for anxiety   B Complex-C-Folic Acid (TRIPHROCAPS) 1 MG CAPS 2/19/2020 at Unknown time Self No Yes   Sig: Take 1 capsule (1 mg total) by mouth daily   CALCIUM CARBONATE PO 2/19/2020 at Unknown time Self Yes Yes   Sig: Take 1,000 mg by mouth daily     Ferric Citrate (AURYXIA) 1  MG(Fe) TABS 2/19/2020 at Unknown time Self Yes Yes   Sig: Take by mouth After Dialysis    KIONEX 15 GM/60ML suspension 2/20/2020 at Unknown time Self Yes Yes   Sig: Take by mouth Takes as a shake   LIDOCAINE EX  Self Yes No   Sig: Apply topically Apply to access 1 hour prior to HD   Melatonin 3 MG CAPS 2/19/2020 at Unknown time Self Yes Yes   Sig: Take 10 mg by mouth daily at bedtime     TIMOLOL MALEATE OP 2/19/2020 at Unknown time Self Yes Yes   Sig: Apply 1 drop to eye 2 (two) times a day   atorvastatin (LIPITOR) 20 mg tablet 2/19/2020 at Unknown time Self Yes Yes   Sig: Take 20 "mg by mouth every evening    brimonidine (ALPHAGAN P) 0 15 % ophthalmic solution 2/19/2020 at Unknown time Self Yes Yes   Sig: Administer 1 drop to both eyes 2 (two) times a day    carvedilol (COREG) 25 mg tablet Past Week at [id number] Self Yes Yes   Sig: Take 25 mg by mouth 2 (two) times a day with meals     cinacalcet (SENSIPAR) 30 mg tablet 2/19/2020 at Unknown time Self Yes Yes   Sig: Take 30 mg by mouth daily   diclofenac sodium (VOLTAREN) 1 % 2/19/2020 at Unknown time  No Yes   Sig: Apply 2 g topically 4 (four) times a day   diphenhydrAMINE (BENADRYL) 25 mg capsule Unknown at Unknown time Self Yes No   Sig: Take by mouth as needed for itching     dorzolamide-timolol (COSOPT) 22 3-6 8 MG/ML ophthalmic solution 2/19/2020 at Unknown time Self Yes Yes   Sig: instill 1 drop into both eyes twice a day   gabapentin (NEURONTIN) 100 mg capsule 2/19/2020 at Unknown time Self Yes Yes   Sig: Take 100 mg by mouth 3 (three) times a day     insulin glargine (LANTUS) 100 units/mL subcutaneous injection 2/19/2020 at Unknown time  No Yes   Sig: Inject 30 Units under the skin daily at bedtime   latanoprost (XALATAN) 0 005 % ophthalmic solution 2/19/2020 at Unknown time Self Yes Yes   Sig: Administer 1 drop to both eyes daily at bedtime   levothyroxine 50 mcg tablet 2/20/2020 at Unknown time Self Yes Yes   Sig: Take 50 mcg by mouth daily   loratadine (CLARITIN) 10 mg tablet 2/20/2020 at Unknown time  Yes Yes   Sig: Take 10 mg by mouth daily   losartan (COZAAR) 25 mg tablet 2/20/2020 at Unknown time Self No Yes   Sig: Take 1 tablet (25 mg total) by mouth daily Take on NON-Dialysis Days   methazolamide (NEPTAZANE) 25 MG tablet 2/19/2020 at Unknown time Self Yes Yes   Sig: Take 25 mg by mouth 3 (three) times a day     nystatin (MYCOSTATIN) powder 2/19/2020 at Unknown time  No Yes   Sig: Apply topically 2 (two) times a day   ondansetron (ZOFRAN) 4 mg tablet 2/19/2020 at Unknown time Self No Yes " Sig: Take 1 tablet (4 mg total) by mouth every 8 (eight) hours as needed for nausea or vomiting   oxyCODONE-acetaminophen (PERCOCET) 5-325 mg per tablet   Yes Yes   Sig: Take 1 tablet by mouth every 4 (four) hours as needed for moderate pain or severe pain   pantoprazole (PROTONIX) 40 mg tablet 2020 at Unknown time  Yes Yes   Sig: Take 40 mg by mouth daily   prednisoLONE acetate (PRED FORTE) 1 % ophthalmic suspension 2020 at Unknown time Self Yes Yes   Sig: Administer 1 drop to both eyes 4 (four) times a day     sertraline (ZOLOFT) 50 mg tablet 2020 at Unknown time Self No Yes   Sig: Take 1 tablet (50 mg total) by mouth daily   torsemide (DEMADEX) 100 mg tablet 2020 at Unknown time Self Yes Yes   Sig: Take 200 mg by mouth every 12 (twelve) hours   traMADol (ULTRAM) 50 mg tablet Not Taking at Unknown time  No No   Sig: Take 1 tablet (50 mg total) by mouth every 8 (eight) hours as needed for moderate pain   Patient not taking: Reported on 2020   warfarin (COUMADIN) 10 mg tablet 2020 at Unknown time  Yes Yes   Sig: Take 10 mg by mouth daily      Facility-Administered Medications: None       Past Medical History:   Diagnosis Date    Abnormal uterine bleeding (AUB)     Anxiety     [facility] mellitus [facility]     Disease of thyroid gland     DVT (deep venous thrombosis) (HCC)     End stage kidney disease (HCC)     Foot ulcer due to secondary DM ([facility] )     Hypertension     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[facility] )     right eye    Morbid obesity ([facility] )     Panic attacks     Reflux esophagitis     Thrombophlebitis of saphenous vein     Warfarin anticoagulation        Past Surgical History:   Procedure Laterality Date    ARTERIOVENOUS GRAFT PLACEMENT      CERVICAL BIOPSY  W/ LOOP ELECTRODE EXCISION       SECTION      DIALYSIS FISTULA CREATION      DILATION AND CURETTAGE OF UTERUS      ENDOMETRIAL ABLATION W/ NOVASURE      EYE SURGERY      HYSTERECTOMY      @ age 57 (complete)    IR FISTULA/GRAFTOGRAM  10/11/2019    OOPHORECTOMY Bilateral     @ age 57    PERICARDIUM SURGERY      WA COLONOSCOPY FLX DX W/COLLJ SPEC WHEN PFRMD N/A 3/13/2019    Procedure: COLONOSCOPY;  Surgeon: Guido Frausto MD;  [facility]; Service: Gastroenterology    WA ESOPHAGOGASTRODUODENOSCOPY TRANSORAL DIAGNOSTIC N/A 3/13/2019    Procedure: ESOPHAGOGASTRODUODENOSCOPY (EGD); Surgeon: Guido Frausto MD;  [facility]; Service: Gastroenterology    WA LAPAROSCOPY W TOT HYSTERECT UTERUS 250 GRAM OR LESS N/A 2/16/2016    Procedure: HYSTERECTOMY LAPAROSCOPIC TOTAL [facility]), with bilateral salpingectomy;  Surgeon: Leigha Galeano DO;  [facility];  Service: Gynecology    THROMBECTOMY / ARTERIOVENOUS GRAFT REVISION      TOE SURGERY Right     removal of the 4th toe       Family History   Problem Relation Age of Onset    Heart disease Family     Diabetes Family     Heart disease Mother     Cancer Brother         neck    Throat cancer Brother     Ovarian cancer Maternal Aunt 36     I have reviewed and agree with the history as documented  E-Cigarette/Vaping    E-Cigarette Use Never User      E-Cigarette/Vaping Substances    Nicotine No     THC No     CBD No     Flavoring No     Other No     Unknown No      Social History     Tobacco Use    Smoking status: Never Smoker    Smokeless tobacco: Never Used   Substance Use Topics    Alcohol use: Never     Alcohol/week: 0 0 standard drinks     Frequency: Never     Drinks per session: Patient refused     Binge frequency: Patient refused    Drug use: No        Review of Systems   Constitutional: Negative for chills and fever  HENT: Negative for ear pain, sinus pain and sore throat  Eyes: Negative for pain  Respiratory: Negative for shortness of breath  Cardiovascular: Positive for chest pain  Gastrointestinal: Negative for abdominal pain, diarrhea, nausea and vomiting     Genitourinary: Negative for difficulty urinating and flank pain  Musculoskeletal: Negative for back pain and neck pain  Neurological: Negative for headaches  All other systems reviewed and are negative  Physical Exam  ED Triage Vitals   Temperature Pulse Respirations Blood Pressure SpO2   02/20/20 0634 02/20/20 0634 02/20/20 0634 02/20/20 0634 02/20/20 0634   98 6 °F (37 °C) 68 18 148/68 93 %      Temp Source Heart Rate Source Patient Position - Orthostatic VS BP Location FiO2 (%)   02/20/20 1045 02/20/20 0634 02/20/20 0634 02/20/20 0634 --   Oral Monitor Lying Right arm       Pain Score       02/20/20 0634       9             Orthostatic Vital Signs  Vitals:    02/20/20 1753 02/20/20 2301 02/21/20 0721 02/21/20 0722   BP: 146/70 143/63 118/68 118/68   Pulse: 83 82 72    Patient Position - Orthostatic VS:  Lying Sitting        Physical Exam   Constitutional: She is oriented to person, place, and time  She appears well-developed and well-nourished  Non-toxic appearance  She does not appear ill  No distress  HENT:   Head: Normocephalic  Nose: Nose normal    Mouth/Throat: Oropharynx is clear and moist    Eyes: Conjunctivae and EOM are normal  Right eye exhibits no discharge  Left eye exhibits no discharge  Neck: Normal range of motion  Cardiovascular: Normal rate  Pulmonary/Chest: Effort normal and breath sounds normal  No stridor  No respiratory distress  Abdominal: Soft  She exhibits no distension  There is no tenderness  There is no rebound and no guarding  Musculoskeletal:   ttp at anterior costochondral junctions b/l   Neurological: She is alert and oriented to person, place, and time  No cranial nerve deficit  Skin: Skin is warm and dry  Capillary refill takes less than 2 seconds  She is not diaphoretic  Nursing note and vitals reviewed        ED Medications  Medications   acetaminophen (TYLENOL) tablet 975 mg (975 mg Oral Given 2/20/20 1002)   lidocaine (LIDODERM) 5 % patch 1 patch (1 patch Topical Patch Removed 2/20/20 2208)       Diagnostic Studies  Results Reviewed     Procedure Component Value Units Date/Time    Sputum culture and Gram stain [[id number]]  (Abnormal) Collected:  02/21/20 0050    Lab Status:  Final result Specimen:  Expectorated Sputum Updated:  02/23/20 1125     Sputum Culture 3+ Growth of       Commensal respiratory adonay only; No significant growth of Staph aureus/MRSA or Pseudomonas aeruginosa       Gram Stain Result 2+ Epithelial Cells      3+ Polys      2+ Gram positive cocci in pairs and chains      1+ Gram negative rods    Procalcitonin [[id number]] Collected:  02/21/20 1013    Lab Status:  No result Specimen:  Blood from Arm, Right Updated:  02/21/20 1013    Hemoglobin A1c w/EAG Estimation (Orders if not completed within the last 90 days) [[id number]]  (Abnormal) Collected:  02/20/20 0855    Lab Status:  Final result Specimen:  Blood from Arm, Right Updated:  02/20/20 2148     Hemoglobin A1C 8 6 %       mg/dl     Influenza A/B and RSV PCR [[id number]]  (Normal) Collected:  02/20/20 1857    Lab Status:  Final result Specimen:  Nasopharyngeal Swab Updated:  02/20/20 2012     INFLUENZA A PCR None Detected     INFLUENZA B PCR None Detected     RSV PCR None Detected    Procalcitonin [[id number]]  (Normal) Collected:  02/20/20 1615    Lab Status:  Final result Specimen:  Blood from Arm, Right Updated:  02/20/20 1731     Procalcitonin 0 19 ng/ml     Legionella antigen, urine [[id number]]     Lab Status:  No result Specimen:  Urine     Strep Pneumoniae, Urine [[id number]]     Lab Status:  No result Specimen:  Urine     CBC and differential [[id number]]  (Abnormal) Collected:  02/20/20 0855    Lab Status:  Final result Specimen:  Blood from Arm, Right Updated:  02/20/20 0907     WBC 8 83 Thousand/uL      RBC 3 12 Million/uL      Hemoglobin 9 7 g/dL      Hematocrit 30 1 %      MCV 97 fL      MCH 31 1 pg      MCHC 32 2 g/dL      RDW 13 7 %      MPV 10 8 fL      Platelets 992 Thousands/uL      nRBC 0 /100 WBCs Neutrophils Relative 73 %      Immat GRANS % 1 %      Lymphocytes Relative 13 %      Monocytes Relative 8 %      Eosinophils Relative 4 %      Basophils Relative 1 %      Neutrophils Absolute 6 55 Thousands/µL      Immature Grans Absolute 0 05 Thousand/uL      Lymphocytes Absolute 1 16 Thousands/µL      Monocytes Absolute 0 68 Thousand/µL      Eosinophils Absolute 0 35 Thousand/µL      Basophils Absolute 0 04 Thousands/µL     Comprehensive metabolic panel [[id number]]  (Abnormal) Collected:  02/20/20 0655    Lab Status:  Final result Specimen:  Blood from Arm, Right Updated:  02/20/20 0729     Sodium 133 mmol/L      Potassium 6 0 mmol/L      Chloride 100 mmol/L      CO2 22 mmol/L      ANION GAP 11 mmol/L      BUN 60 mg/dL      Creatinine 8 54 mg/dL      Glucose 99 mg/dL      Calcium 8 6 mg/dL      AST 31 U/L      ALT 19 U/L      Alkaline Phosphatase 120 U/L      Total Protein 7 0 g/dL      Albumin 2 6 g/dL      Total Bilirubin 0 62 mg/dL      eGFR 5 ml/min/1 73sq m     Narrative:       National Kid[employer] Disease Foundation guidelines for Chronic Kidney Disease (CKD):     Stage 1 with normal or high GFR (GFR > 90 mL/min/1 73 square meters)    Stage 2 Mild CKD (GFR = 60-89 mL/min/1 73 square meters)    Stage 3A Moderate CKD (GFR = 45-59 mL/min/1 73 square meters)    Stage 3B Moderate CKD (GFR = 30-44 mL/min/1 73 square meters)    Stage 4 Severe CKD (GFR = 15-29 mL/min/1 73 square meters)    Stage 5 End Stage CKD (GFR <15 mL/min/1 73 square meters)  Note: GFR calculation is accurate only with a steady state creatinine    Troponin I [[id number]]  (Normal) Collected:  02/20/20 0655    Lab Status:  Final result Specimen:  Blood from Arm, Right Updated:  02/20/20 5350     Troponin I <0 02 ng/mL                  CT chest wo contrast   Final Result by Yue Lemus MD (02/20 1945)      Mild patchy groundglass opacities within the left lower lobe, nonspecific but could be infectious or inflammatory in etiology  Right lower lobe subsegmental atelectasis  Mild cardiomegaly  Atrophic native kidneys  Renal osteodystrophy  Workstation performed: [id number]         XR chest 2 views   Final Result by Mao Denton MD (02/20 2625)      Persistent opacities in the left mid and lower lung zones suggesting left pleural effusion and questionable lingular infiltrate  Follow-up PA and lateral evaluation is recommended  The study was marked in [facility] for immediate notification  Workstation performed: [id number]               Procedures  Procedures      ED Course                                     MDM  Number of Diagnoses or Management Options  Atypical chest pain:   Chest wall pain:   Dyspnea:   Lingular pneumonia:   Diagnosis management comments: Basic labs, chest x-ray  Patient is well-appearing with normal vital signs  Likely dialysis and discharge  Sign out to incoming attending  On chart review after receiving dialysis, patient was complaining of shortness of breath, did not want to leave  Was admitted for evaluation of cough, flu-like symptoms, x-ray with left pleural effusion and questionable lingular infiltrate    Procal was ordered patient admitted        Disposition  Final diagnoses:   Atypical chest pain   Chest wall pain   Lingular pneumonia   Dyspnea     Time reflects when diagnosis was documented in both MDM as applicable and the Disposition within this note     Time User Action Codes Description Comment    2/20/2020  7:03 AM Marlo Halt T Add [R07 89] Atypical chest pain     2/20/2020  7:06 AM Thao Rower Add [R07 89] Chest wall pain     2/20/2020  9:39 AM Anoop Covarrubias Add [J18 9] Lingular pneumonia     2/20/2020  3:13 PM Katie Relic Modify [R07 89] Atypical chest pain     2/20/2020  3:13 PM Katie Relic Modify [J18 9] Lingular pneumonia     2/20/2020  3:19 PM Katie Relic Add [R06 00] Dyspnea     2/21/2020 10:21 AM Akash Boop Add [L03 066, L97 509] Diabetic foot ulcer associated with diabetes mellitus due to underlying condition, unspecified laterality, unspecified part of foot, unspecified ulcer stage ([facility] )     2/21/2020 10:22 AM Wilfrid Orozco Modify [Y42 080,  L97 509] Diabetic foot ulcer associated with diabetes mellitus due to underlying condition, unspecified laterality, unspecified part of foot, unspecified ulcer stage ([facility] )     2/21/2020 10:22 AM Watson Mix M Add [D90 783] History of DVT (deep vein thrombosis)     2/21/2020 10:22 AM Watson Mix M Add [R26 2] Ambulatory dysfunction     2/21/2020 10:22 AM Watson Mix M Add [R68 89] Flu-like symptoms     2/21/2020 10:22 AM Kathee Williamston Add [M25 561,  M25 562,  G89 29] Chronic pain of both knees     2/21/2020 10:22 AM Watson Mix M Add [K21 9] Gastroesophageal reflux disease     2/21/2020 10:22 AM Wilfrid Orozco Modify [K35 985,  L97 509] Diabetic foot ulcer associated with diabetes mellitus due to underlying condition, unspecified laterality, unspecified part of foot, unspecified ulcer stage [facility])       ED Disposition     ED Disposition Condition Date/Time Comment    Admit Stable u Feb 20, 2020  3:13 PM pneumonia      Follow-up Information     Follow up With Specialties Details Why Contact Info    Azael Mari MD Internal Medicine Schedule an appointment as soon as possible for a visit in 1 week(s)  [address] [address]  [phone]            Discharge Medication List as of 2/21/2020 12:28 PM      START taking these medications    Details   acetaminophen (TYLENOL) 325 mg tablet Take 2 tablets (650 mg total) by mouth every 6 (six) hours as needed for mild pain or fever, Starting Thu 2/20/2020, Print         CONTINUE these medications which have CHANGED    Details   albuterol (ProAir HFA) 90 mcg/act inhaler Inhale 2 puffs every 6 (six) hours as needed for wheezing or shortness of breath, Starting Fri 2/21/2020, Normal      gabapentin (NEURONTIN) 100 mg capsule Take 3 capsules (300 mg total) by mouth daily at bedtime, Starting Fri 2/21/2020, No Print      pantoprazole (PROTONIX) 40 mg tablet Take 1 tablet (40 mg total) by mouth daily in the early morning, Starting Fri 2/21/2020, No Print      senna (SENOKOT) 8 6 mg Take 2 tablets (17 2 mg total) by mouth daily at bedtime as needed for constipation, Starting Fri 2/21/2020, Normal      traMADol (ULTRAM) 50 mg tablet Take 1 tablet (50 mg total) by mouth every 12 (twelve) hours as needed for moderate pain for up to 10 doses, Starting Fri 2/21/2020, Normal      !! warfarin (COUMADIN) 2 mg tablet Take a 2 mg tablet in addition to a 10 mg tablet for a total of 12 mg daily until INR is therapeutic, Normal       !! - Potential duplicate medications found  Please discuss with provider  CONTINUE these medications which have NOT CHANGED    Details   ALPRAZolam (XANAX) 0 25 mg tablet Take 0 25 mg by mouth 2 (two) times a day as needed for anxiety, Historical Med      atorvastatin (LIPITOR) 20 mg tablet Take 20 mg by mouth every evening , Starting Tue 4/24/2018, Historical Med      B Complex-C-Folic Acid (TRIPHROCAPS) 1 MG CAPS Take 1 capsule (1 mg total) by mouth daily, Starting Sun 4/28/2019, Print      brimonidine (ALPHAGAN P) 0 15 % ophthalmic solution Administer 1 drop to both eyes 2 (two) times a day , Historical Med      CALCIUM CARBONATE PO Take 1,000 mg by mouth daily  , Historical Med      carvedilol (COREG) 25 mg tablet Take 25 mg by mouth 2 (two) times a day with meals  , Historical Med      cinacalcet (SENSIPAR) 30 mg tablet Take 30 mg by mouth daily, Historical Med      diclofenac sodium (VOLTAREN) 1 % Apply 2 g topically 4 (four) times a day, Starting Sun 5/19/2019, Print      dorzolamide-timolol (COSOPT) 22 3-6 8 MG/ML ophthalmic solution instill 1 drop into both eyes twice a day, Historical Med      Ferric Citrate (AURYXIA) 1  MG(Fe) TABS Take by mouth After Dialysis , Starting Wed 3/29/2017, Historical Med      insulin glargine (LANTUS) 100 units/mL subcutaneous injection Inject 30 Units under the skin daily at bedtime, Starting Sat 8/31/2019, Print      KIONEX 15 GM/60ML suspension Take by mouth Takes as a shake, Starting Mon 12/10/2018, Historical Med      latanoprost (XALATAN) 0 005 % ophthalmic solution Administer 1 drop to both eyes daily at bedtime, Historical Med      levothyroxine 50 mcg tablet Take 50 mcg by mouth daily, Historical Med      loratadine (CLARITIN) 10 mg tablet Take 10 mg by mouth daily, Historical Med      losartan (COZAAR) 25 mg tablet Take 1 tablet (25 mg total) by mouth daily Take on NON-Dialysis Days, Starting Wed 5/23/2018, No Print      Melatonin 3 MG CAPS Take 10 mg by mouth daily at bedtime  , Historical Med      methazolamide (NEPTAZANE) 25 MG tablet Take 25 mg by mouth 3 (three) times a day  , Historical Med      nystatin (MYCOSTATIN) powder Apply topically 2 (two) times a day, Starting Sat 8/31/2019, Print      ondansetron (ZOFRAN) 4 mg tablet Take 1 tablet (4 mg total) by mouth every 8 (eight) hours as needed for nausea or vomiting, Starting Tue 1/22/2019, Normal      oxyCODONE-acetaminophen (PERCOCET) 5-325 mg per tablet Take 1 tablet by mouth every 4 (four) hours as needed for moderate pain or severe pain, Historical Med      prednisoLONE acetate (PRED FORTE) 1 % ophthalmic suspension Administer 1 drop to both eyes 4 (four) times a day  , Historical Med      sertraline (ZOLOFT) 50 mg tablet Take 1 tablet (50 mg total) by mouth daily, Starting Sun 4/28/2019, Print      TIMOLOL MALEATE OP Apply 1 drop to eye 2 (two) times a day, Historical Med      torsemide (DEMADEX) 100 mg tablet Take 200 mg by mouth every 12 (twelve) hours, Starting Wed 1/2/2019, Historical Med      !! warfarin (COUMADIN) 10 mg tablet Take 10 mg by mouth daily, Historical Med      Podiatric Products (FLEXITOL HEEL BALM) OINT Apply topically 2 (two) times a day, Starting Fri 1/31/2020, Until Tue 3/31/2020, Print      diphenhydrAMINE (BENADRYL) 25 mg capsule Take by mouth as needed for itching  , Historical Med      LIDOCAINE EX Apply topically Apply to access 1 hour prior to HD, Historical Med       !! - Potential duplicate medications found  Please discuss with provider  STOP taking these medications       amoxicillin (AMOXIL) 500 mg capsule Comments:   Reason for Stopping:         azithromycin (ZITHROMAX) 250 mg tablet Comments:   Reason for Stopping:             Outpatient Discharge Orders   Protime-INR   Standing Status: Future Number of Occurrences: 1 Standing Exp  Date: 02/21/21     Discharge Diet     Activity as tolerated     Call provider for:  difficulty breathing, headache or visual disturbances       PDMP Review       Value Time User    PDMP Reviewed  Yes 3/31/2020  1:43 PM Bijal Tejeda PA-C           ED Provider  Attending physically available and evaluated Kelly Montesinos I managed the patient along with the ED Attending      Electronically Signed by         Jv Tapia MD  04/07/20 2111       Jv Tapia MD  04/07/20 6362       Jv Tapia MD  04/07/20 5591

## 2020-04-14 ENCOUNTER — OFFICE VISIT (OUTPATIENT)
Dept: PODIATRY | Facility: CLINIC | Age: 55
End: 2020-04-14
Payer: MEDICARE

## 2020-04-14 VITALS — HEIGHT: 66 IN | WEIGHT: 274 LBS | BODY MASS INDEX: 44.03 KG/M2

## 2020-04-14 DIAGNOSIS — E11.39 TYPE 2 DIABETES MELLITUS WITH OTHER OPHTHALMIC COMPLICATION, UNSPECIFIED WHETHER LONG TERM INSULIN USE (HCC): ICD-10-CM

## 2020-04-14 DIAGNOSIS — L97.512 RIGHT FOOT ULCER, WITH FAT LAYER EXPOSED (HCC): ICD-10-CM

## 2020-04-14 DIAGNOSIS — E11.621 DIABETIC ULCER OF TOE OF RIGHT FOOT ASSOCIATED WITH TYPE 2 DIABETES MELLITUS, WITH FAT LAYER EXPOSED (HCC): Primary | ICD-10-CM

## 2020-04-14 DIAGNOSIS — L97.512 DIABETIC ULCER OF TOE OF RIGHT FOOT ASSOCIATED WITH TYPE 2 DIABETES MELLITUS, WITH FAT LAYER EXPOSED (HCC): Primary | ICD-10-CM

## 2020-04-14 DIAGNOSIS — E11.42 DIABETIC POLYNEUROPATHY ASSOCIATED WITH TYPE 2 DIABETES MELLITUS (HCC): ICD-10-CM

## 2020-04-14 PROCEDURE — 11042 DBRDMT SUBQ TIS 1ST 20SQCM/<: CPT | Performed by: PODIATRIST

## 2020-04-16 ENCOUNTER — TELEPHONE (OUTPATIENT)
Dept: PODIATRY | Facility: CLINIC | Age: 55
End: 2020-04-16

## 2020-04-22 ENCOUNTER — PROCEDURE VISIT (OUTPATIENT)
Dept: PODIATRY | Facility: CLINIC | Age: 55
End: 2020-04-22
Payer: MEDICARE

## 2020-04-22 VITALS — WEIGHT: 280.8 LBS | BODY MASS INDEX: 45.13 KG/M2 | HEIGHT: 66 IN

## 2020-04-22 DIAGNOSIS — E11.621 DIABETIC ULCER OF TOE OF RIGHT FOOT ASSOCIATED WITH TYPE 2 DIABETES MELLITUS, WITH FAT LAYER EXPOSED (HCC): ICD-10-CM

## 2020-04-22 DIAGNOSIS — L97.512 DIABETIC ULCER OF TOE OF RIGHT FOOT ASSOCIATED WITH TYPE 2 DIABETES MELLITUS, WITH FAT LAYER EXPOSED (HCC): ICD-10-CM

## 2020-04-22 DIAGNOSIS — E11.42 DIABETIC POLYNEUROPATHY ASSOCIATED WITH TYPE 2 DIABETES MELLITUS (HCC): Primary | ICD-10-CM

## 2020-04-22 PROCEDURE — 11042 DBRDMT SUBQ TIS 1ST 20SQCM/<: CPT | Performed by: PODIATRIST

## 2020-04-23 ENCOUNTER — TELEMEDICINE (OUTPATIENT)
Dept: BARIATRICS | Facility: CLINIC | Age: 55
End: 2020-04-23
Payer: MEDICARE

## 2020-04-23 VITALS — HEIGHT: 66 IN | WEIGHT: 279 LBS | BODY MASS INDEX: 44.84 KG/M2

## 2020-04-23 DIAGNOSIS — E66.01 MORBID OBESITY (HCC): Primary | ICD-10-CM

## 2020-04-23 DIAGNOSIS — N18.6 ESRD ON HEMODIALYSIS (HCC): Chronic | ICD-10-CM

## 2020-04-23 DIAGNOSIS — E11.39 TYPE 2 DIABETES MELLITUS WITH OTHER OPHTHALMIC COMPLICATION, UNSPECIFIED WHETHER LONG TERM INSULIN USE (HCC): ICD-10-CM

## 2020-04-23 DIAGNOSIS — E11.42 DIABETIC POLYNEUROPATHY ASSOCIATED WITH TYPE 2 DIABETES MELLITUS (HCC): ICD-10-CM

## 2020-04-23 DIAGNOSIS — Z99.2 ESRD ON HEMODIALYSIS (HCC): Chronic | ICD-10-CM

## 2020-04-23 DIAGNOSIS — I42.9 CARDIOMYOPATHY (HCC): ICD-10-CM

## 2020-04-23 DIAGNOSIS — I10 HYPERTENSION: ICD-10-CM

## 2020-04-23 DIAGNOSIS — K21.9 GASTROESOPHAGEAL REFLUX DISEASE: ICD-10-CM

## 2020-04-23 DIAGNOSIS — E03.9 HYPOTHYROIDISM: ICD-10-CM

## 2020-04-23 PROBLEM — S06.0X0A CONCUSSION WITHOUT LOSS OF CONSCIOUSNESS: Status: ACTIVE | Noted: 2019-03-15

## 2020-04-23 PROCEDURE — 99215 OFFICE O/P EST HI 40 MIN: CPT | Performed by: FAMILY MEDICINE

## 2020-04-23 RX ORDER — WARFARIN SODIUM 3 MG/1
9 TABLET ORAL
Status: ON HOLD | COMMUNITY
End: 2021-06-11 | Stop reason: SDUPTHER

## 2020-04-28 ENCOUNTER — TELEPHONE (OUTPATIENT)
Dept: PODIATRY | Facility: CLINIC | Age: 55
End: 2020-04-28

## 2020-04-28 DIAGNOSIS — L97.512 DIABETIC ULCER OF TOE OF RIGHT FOOT ASSOCIATED WITH TYPE 2 DIABETES MELLITUS, WITH FAT LAYER EXPOSED (HCC): Primary | ICD-10-CM

## 2020-04-28 DIAGNOSIS — E11.621 DIABETIC ULCER OF TOE OF RIGHT FOOT ASSOCIATED WITH TYPE 2 DIABETES MELLITUS, WITH FAT LAYER EXPOSED (HCC): Primary | ICD-10-CM

## 2020-04-28 RX ORDER — CEPHALEXIN 500 MG/1
500 CAPSULE ORAL EVERY 8 HOURS SCHEDULED
Qty: 21 CAPSULE | Refills: 0 | Status: SHIPPED | OUTPATIENT
Start: 2020-04-28 | End: 2020-05-06 | Stop reason: SDUPTHER

## 2020-05-06 ENCOUNTER — PROCEDURE VISIT (OUTPATIENT)
Dept: PODIATRY | Facility: CLINIC | Age: 55
End: 2020-05-06
Payer: MEDICARE

## 2020-05-06 VITALS — BODY MASS INDEX: 44.23 KG/M2 | WEIGHT: 275.2 LBS | HEIGHT: 66 IN

## 2020-05-06 DIAGNOSIS — L97.512 RIGHT FOOT ULCER, WITH FAT LAYER EXPOSED (HCC): ICD-10-CM

## 2020-05-06 DIAGNOSIS — L97.512 DIABETIC ULCER OF TOE OF RIGHT FOOT ASSOCIATED WITH TYPE 2 DIABETES MELLITUS, WITH FAT LAYER EXPOSED (HCC): ICD-10-CM

## 2020-05-06 DIAGNOSIS — E11.621 DIABETIC ULCER OF TOE OF RIGHT FOOT ASSOCIATED WITH TYPE 2 DIABETES MELLITUS, WITH FAT LAYER EXPOSED (HCC): ICD-10-CM

## 2020-05-06 DIAGNOSIS — E11.42 TYPE 2 DIABETES MELLITUS WITH DIABETIC POLYNEUROPATHY, UNSPECIFIED WHETHER LONG TERM INSULIN USE (HCC): ICD-10-CM

## 2020-05-06 DIAGNOSIS — M86.471 CHRONIC OSTEOMYELITIS OF RIGHT FOOT WITH DRAINING SINUS (HCC): Primary | ICD-10-CM

## 2020-05-06 PROCEDURE — 11042 DBRDMT SUBQ TIS 1ST 20SQCM/<: CPT | Performed by: PODIATRIST

## 2020-05-06 PROCEDURE — 99213 OFFICE O/P EST LOW 20 MIN: CPT | Performed by: PODIATRIST

## 2020-05-06 RX ORDER — CEPHALEXIN 250 MG/1
250 CAPSULE ORAL EVERY 12 HOURS SCHEDULED
Qty: 28 CAPSULE | Refills: 0 | Status: SHIPPED | OUTPATIENT
Start: 2020-05-06 | End: 2020-05-20

## 2020-05-08 ENCOUNTER — TRANSCRIBE ORDERS (OUTPATIENT)
Dept: RADIOLOGY | Facility: HOSPITAL | Age: 55
End: 2020-05-08

## 2020-05-08 ENCOUNTER — HOSPITAL ENCOUNTER (OUTPATIENT)
Dept: RADIOLOGY | Facility: HOSPITAL | Age: 55
Discharge: HOME | End: 2020-05-08
Attending: PODIATRIST
Payer: MEDICARE

## 2020-05-08 DIAGNOSIS — L97.512 DIABETIC ULCER OF TOE OF RIGHT FOOT ASSOCIATED WITH TYPE 2 DIABETES MELLITUS, WITH FAT LAYER EXPOSED (HCC): ICD-10-CM

## 2020-05-08 DIAGNOSIS — E11.621 DIABETIC ULCER OF TOE OF RIGHT FOOT ASSOCIATED WITH TYPE 2 DIABETES MELLITUS, WITH FAT LAYER EXPOSED (HCC): ICD-10-CM

## 2020-05-08 PROCEDURE — 73630 X-RAY EXAM OF FOOT: CPT

## 2020-05-11 ENCOUNTER — TRANSCRIBE ORDERS (OUTPATIENT)
Dept: LAB | Facility: HOSPITAL | Age: 55
End: 2020-05-11

## 2020-05-11 ENCOUNTER — APPOINTMENT (OUTPATIENT)
Dept: LAB | Facility: HOSPITAL | Age: 55
End: 2020-05-11
Attending: PODIATRIST
Payer: MEDICARE

## 2020-05-11 DIAGNOSIS — I26.99 PULMONARY EMBOLISM, UNSPECIFIED CHRONICITY, UNSPECIFIED PULMONARY EMBOLISM TYPE, UNSPECIFIED WHETHER ACUTE COR PULMONALE PRESENT (HCC): ICD-10-CM

## 2020-05-11 DIAGNOSIS — L97.512 DIABETIC ULCER OF TOE OF RIGHT FOOT ASSOCIATED WITH TYPE 2 DIABETES MELLITUS, WITH FAT LAYER EXPOSED (HCC): ICD-10-CM

## 2020-05-11 DIAGNOSIS — E11.621 DIABETIC ULCER OF TOE OF RIGHT FOOT ASSOCIATED WITH TYPE 2 DIABETES MELLITUS, WITH FAT LAYER EXPOSED (HCC): ICD-10-CM

## 2020-05-11 DIAGNOSIS — I26.99 PULMONARY EMBOLISM, UNSPECIFIED CHRONICITY, UNSPECIFIED PULMONARY EMBOLISM TYPE, UNSPECIFIED WHETHER ACUTE COR PULMONALE PRESENT (HCC): Primary | ICD-10-CM

## 2020-05-11 LAB
CRP SERPL QL: 23.7 MG/L
ERYTHROCYTE [SEDIMENTATION RATE] IN BLOOD: 72 MM/HOUR (ref 0–20)
INR PPP: 2.06 (ref 0.84–1.19)
PROTHROMBIN TIME: 22.7 SECONDS (ref 11.6–14.5)

## 2020-05-11 PROCEDURE — 36415 COLL VENOUS BLD VENIPUNCTURE: CPT

## 2020-05-11 PROCEDURE — 85610 PROTHROMBIN TIME: CPT

## 2020-05-11 PROCEDURE — 85652 RBC SED RATE AUTOMATED: CPT

## 2020-05-11 PROCEDURE — 86140 C-REACTIVE PROTEIN: CPT

## 2020-05-13 ENCOUNTER — PROCEDURE VISIT (OUTPATIENT)
Dept: PODIATRY | Facility: CLINIC | Age: 55
End: 2020-05-13
Payer: MEDICARE

## 2020-05-13 VITALS — BODY MASS INDEX: 44.84 KG/M2 | HEIGHT: 66 IN | WEIGHT: 279 LBS

## 2020-05-13 DIAGNOSIS — Z01.818 PREOP TESTING: ICD-10-CM

## 2020-05-13 DIAGNOSIS — M86.471 CHRONIC OSTEOMYELITIS OF RIGHT FOOT WITH DRAINING SINUS (HCC): Primary | ICD-10-CM

## 2020-05-13 PROCEDURE — 99214 OFFICE O/P EST MOD 30 MIN: CPT | Performed by: PODIATRIST

## 2020-05-13 RX ORDER — ATROPINE SULFATE 10 MG/ML
1 SOLUTION/ DROPS OPHTHALMIC
COMMUNITY
Start: 2019-02-04 | End: 2021-11-21

## 2020-05-13 RX ORDER — INSULIN ASPART 100 [IU]/ML
INJECTION, SOLUTION INTRAVENOUS; SUBCUTANEOUS
COMMUNITY
Start: 2020-04-30

## 2020-05-13 RX ORDER — SEVELAMER CARBONATE 800 MG/1
TABLET, FILM COATED ORAL
COMMUNITY
Start: 2020-04-09 | End: 2021-06-11 | Stop reason: HOSPADM

## 2020-05-13 RX ORDER — SUCROFERRIC OXYHYDROXIDE 500 MG/1
TABLET, CHEWABLE ORAL
COMMUNITY
Start: 2020-02-19 | End: 2020-05-13 | Stop reason: SDUPTHER

## 2020-05-20 ENCOUNTER — APPOINTMENT (OUTPATIENT)
Dept: LAB | Facility: HOSPITAL | Age: 55
End: 2020-05-20
Attending: PODIATRIST
Payer: MEDICARE

## 2020-05-20 DIAGNOSIS — Z01.818 PREOP TESTING: ICD-10-CM

## 2020-05-20 DIAGNOSIS — M86.471 CHRONIC OSTEOMYELITIS OF RIGHT FOOT WITH DRAINING SINUS (HCC): ICD-10-CM

## 2020-05-20 LAB
ANION GAP SERPL CALCULATED.3IONS-SCNC: 8 MMOL/L (ref 4–13)
BASOPHILS # BLD AUTO: 0.08 THOUSANDS/ΜL (ref 0–0.1)
BASOPHILS NFR BLD AUTO: 1 % (ref 0–1)
BUN SERPL-MCNC: 48 MG/DL (ref 5–25)
CALCIUM SERPL-MCNC: 8.5 MG/DL (ref 8.3–10.1)
CHLORIDE SERPL-SCNC: 101 MMOL/L (ref 100–108)
CO2 SERPL-SCNC: 27 MMOL/L (ref 21–32)
CREAT SERPL-MCNC: 7.06 MG/DL (ref 0.6–1.3)
EOSINOPHIL # BLD AUTO: 0.31 THOUSAND/ΜL (ref 0–0.61)
EOSINOPHIL NFR BLD AUTO: 5 % (ref 0–6)
ERYTHROCYTE [DISTWIDTH] IN BLOOD BY AUTOMATED COUNT: 13.2 % (ref 11.6–15.1)
GFR SERPL CREATININE-BSD FRML MDRD: 6 ML/MIN/1.73SQ M
GLUCOSE P FAST SERPL-MCNC: 114 MG/DL (ref 65–99)
HCT VFR BLD AUTO: 31.3 % (ref 34.8–46.1)
HGB BLD-MCNC: 10.3 G/DL (ref 11.5–15.4)
IMM GRANULOCYTES # BLD AUTO: 0.02 THOUSAND/UL (ref 0–0.2)
IMM GRANULOCYTES NFR BLD AUTO: 0 % (ref 0–2)
LYMPHOCYTES # BLD AUTO: 0.94 THOUSANDS/ΜL (ref 0.6–4.47)
LYMPHOCYTES NFR BLD AUTO: 15 % (ref 14–44)
MCH RBC QN AUTO: 31.6 PG (ref 26.8–34.3)
MCHC RBC AUTO-ENTMCNC: 32.9 G/DL (ref 31.4–37.4)
MCV RBC AUTO: 96 FL (ref 82–98)
MONOCYTES # BLD AUTO: 0.59 THOUSAND/ΜL (ref 0.17–1.22)
MONOCYTES NFR BLD AUTO: 10 % (ref 4–12)
NEUTROPHILS # BLD AUTO: 4.18 THOUSANDS/ΜL (ref 1.85–7.62)
NEUTS SEG NFR BLD AUTO: 69 % (ref 43–75)
NRBC BLD AUTO-RTO: 0 /100 WBCS
PLATELET # BLD AUTO: 195 THOUSANDS/UL (ref 149–390)
PMV BLD AUTO: 10.9 FL (ref 8.9–12.7)
POTASSIUM SERPL-SCNC: 5 MMOL/L (ref 3.5–5.3)
RBC # BLD AUTO: 3.26 MILLION/UL (ref 3.81–5.12)
SODIUM SERPL-SCNC: 136 MMOL/L (ref 136–145)
WBC # BLD AUTO: 6.12 THOUSAND/UL (ref 4.31–10.16)

## 2020-05-20 PROCEDURE — 85025 COMPLETE CBC W/AUTO DIFF WBC: CPT

## 2020-05-20 PROCEDURE — 36415 COLL VENOUS BLD VENIPUNCTURE: CPT

## 2020-05-20 PROCEDURE — 80048 BASIC METABOLIC PNL TOTAL CA: CPT

## 2020-05-21 DIAGNOSIS — M86.471 CHRONIC OSTEOMYELITIS OF RIGHT FOOT WITH DRAINING SINUS (HCC): ICD-10-CM

## 2020-05-21 DIAGNOSIS — Z01.818 PREOP TESTING: ICD-10-CM

## 2020-05-21 PROCEDURE — U0003 INFECTIOUS AGENT DETECTION BY NUCLEIC ACID (DNA OR RNA); SEVERE ACUTE RESPIRATORY SYNDROME CORONAVIRUS 2 (SARS-COV-2) (CORONAVIRUS DISEASE [COVID-19]), AMPLIFIED PROBE TECHNIQUE, MAKING USE OF HIGH THROUGHPUT TECHNOLOGIES AS DESCRIBED BY CMS-2020-01-R: HCPCS

## 2020-05-23 LAB — SARS-COV-2 RNA SPEC QL NAA+PROBE: NOT DETECTED

## 2020-05-27 ENCOUNTER — ANESTHESIA EVENT (OUTPATIENT)
Dept: PERIOP | Facility: HOSPITAL | Age: 55
End: 2020-05-27
Payer: MEDICARE

## 2020-05-28 ENCOUNTER — TELEPHONE (OUTPATIENT)
Dept: BARIATRICS | Facility: CLINIC | Age: 55
End: 2020-05-28

## 2020-05-28 ENCOUNTER — HOSPITAL ENCOUNTER (OUTPATIENT)
Facility: HOSPITAL | Age: 55
Setting detail: OUTPATIENT SURGERY
Discharge: HOME | End: 2020-05-28
Attending: PODIATRIST | Admitting: PODIATRIST
Payer: MEDICARE

## 2020-05-28 ENCOUNTER — ANESTHESIA (OUTPATIENT)
Dept: PERIOP | Facility: HOSPITAL | Age: 55
End: 2020-05-28
Payer: MEDICARE

## 2020-05-28 ENCOUNTER — APPOINTMENT (OUTPATIENT)
Dept: RADIOLOGY | Facility: HOSPITAL | Age: 55
End: 2020-05-28
Payer: MEDICARE

## 2020-05-28 VITALS
HEART RATE: 74 BPM | HEIGHT: 66 IN | DIASTOLIC BLOOD PRESSURE: 75 MMHG | RESPIRATION RATE: 18 BRPM | WEIGHT: 270.06 LBS | BODY MASS INDEX: 43.4 KG/M2 | OXYGEN SATURATION: 95 % | SYSTOLIC BLOOD PRESSURE: 175 MMHG | TEMPERATURE: 98.3 F

## 2020-05-28 DIAGNOSIS — M86.471 CHRONIC OSTEOMYELITIS OF RIGHT FOOT WITH DRAINING SINUS (HCC): ICD-10-CM

## 2020-05-28 DIAGNOSIS — G89.18 POSTOPERATIVE PAIN: Primary | ICD-10-CM

## 2020-05-28 LAB
GLUCOSE SERPL-MCNC: 121 MG/DL (ref 65–140)
GLUCOSE SERPL-MCNC: 137 MG/DL (ref 65–140)

## 2020-05-28 PROCEDURE — 93005 ELECTROCARDIOGRAM TRACING: CPT

## 2020-05-28 PROCEDURE — 99024 POSTOP FOLLOW-UP VISIT: CPT | Performed by: PODIATRIST

## 2020-05-28 PROCEDURE — 73630 X-RAY EXAM OF FOOT: CPT

## 2020-05-28 PROCEDURE — 28820 AMPUTATION OF TOE: CPT | Performed by: PODIATRIST

## 2020-05-28 PROCEDURE — 82948 REAGENT STRIP/BLOOD GLUCOSE: CPT

## 2020-05-28 PROCEDURE — 88305 TISSUE EXAM BY PATHOLOGIST: CPT | Performed by: PATHOLOGY

## 2020-05-28 PROCEDURE — 88311 DECALCIFY TISSUE: CPT | Performed by: PATHOLOGY

## 2020-05-28 RX ORDER — ONDANSETRON 2 MG/ML
4 INJECTION INTRAMUSCULAR; INTRAVENOUS ONCE AS NEEDED
Status: DISCONTINUED | OUTPATIENT
Start: 2020-05-28 | End: 2020-05-28 | Stop reason: HOSPADM

## 2020-05-28 RX ORDER — LABETALOL 20 MG/4 ML (5 MG/ML) INTRAVENOUS SYRINGE
5 ONCE
Status: COMPLETED | OUTPATIENT
Start: 2020-05-28 | End: 2020-05-28

## 2020-05-28 RX ORDER — MIDAZOLAM HYDROCHLORIDE 2 MG/2ML
INJECTION, SOLUTION INTRAMUSCULAR; INTRAVENOUS AS NEEDED
Status: DISCONTINUED | OUTPATIENT
Start: 2020-05-28 | End: 2020-05-28 | Stop reason: SURG

## 2020-05-28 RX ORDER — FENTANYL CITRATE 50 UG/ML
INJECTION, SOLUTION INTRAMUSCULAR; INTRAVENOUS AS NEEDED
Status: DISCONTINUED | OUTPATIENT
Start: 2020-05-28 | End: 2020-05-28 | Stop reason: SURG

## 2020-05-28 RX ORDER — OXYCODONE HYDROCHLORIDE AND ACETAMINOPHEN 5; 325 MG/1; MG/1
1 TABLET ORAL EVERY 4 HOURS PRN
Qty: 5 TABLET | Refills: 0 | Status: SHIPPED | OUTPATIENT
Start: 2020-05-28 | End: 2020-06-07

## 2020-05-28 RX ORDER — PROPOFOL 10 MG/ML
INJECTION, EMULSION INTRAVENOUS CONTINUOUS PRN
Status: DISCONTINUED | OUTPATIENT
Start: 2020-05-28 | End: 2020-05-28 | Stop reason: SURG

## 2020-05-28 RX ORDER — SODIUM CHLORIDE 9 MG/ML
125 INJECTION, SOLUTION INTRAVENOUS CONTINUOUS
Status: DISCONTINUED | OUTPATIENT
Start: 2020-05-28 | End: 2020-05-28 | Stop reason: HOSPADM

## 2020-05-28 RX ORDER — FENTANYL CITRATE/PF 50 MCG/ML
25 SYRINGE (ML) INJECTION
Status: DISCONTINUED | OUTPATIENT
Start: 2020-05-28 | End: 2020-05-28 | Stop reason: HOSPADM

## 2020-05-28 RX ORDER — ACETAMINOPHEN 325 MG/1
975 TABLET ORAL EVERY 6 HOURS PRN
Status: DISCONTINUED | OUTPATIENT
Start: 2020-05-28 | End: 2020-05-28 | Stop reason: HOSPADM

## 2020-05-28 RX ORDER — BUPIVACAINE HYDROCHLORIDE 5 MG/ML
INJECTION, SOLUTION EPIDURAL; INTRACAUDAL AS NEEDED
Status: DISCONTINUED | OUTPATIENT
Start: 2020-05-28 | End: 2020-05-28 | Stop reason: HOSPADM

## 2020-05-28 RX ORDER — OXYCODONE HYDROCHLORIDE AND ACETAMINOPHEN 5; 325 MG/1; MG/1
1 TABLET ORAL ONCE
Status: COMPLETED | OUTPATIENT
Start: 2020-05-28 | End: 2020-05-28

## 2020-05-28 RX ORDER — KETAMINE HYDROCHLORIDE 50 MG/ML
INJECTION, SOLUTION, CONCENTRATE INTRAMUSCULAR; INTRAVENOUS AS NEEDED
Status: DISCONTINUED | OUTPATIENT
Start: 2020-05-28 | End: 2020-05-28 | Stop reason: SURG

## 2020-05-28 RX ORDER — MAGNESIUM HYDROXIDE 1200 MG/15ML
LIQUID ORAL AS NEEDED
Status: DISCONTINUED | OUTPATIENT
Start: 2020-05-28 | End: 2020-05-28 | Stop reason: HOSPADM

## 2020-05-28 RX ADMIN — KETAMINE HYDROCHLORIDE 30 MG: 50 INJECTION INTRAMUSCULAR; INTRAVENOUS at 15:06

## 2020-05-28 RX ADMIN — MIDAZOLAM 2 MG: 1 INJECTION INTRAMUSCULAR; INTRAVENOUS at 14:59

## 2020-05-28 RX ADMIN — FENTANYL CITRATE 50 MCG: 50 INJECTION, SOLUTION INTRAMUSCULAR; INTRAVENOUS at 15:29

## 2020-05-28 RX ADMIN — PROPOFOL 50 MCG/KG/MIN: 10 INJECTION, EMULSION INTRAVENOUS at 15:06

## 2020-05-28 RX ADMIN — FENTANYL CITRATE 50 MCG: 50 INJECTION, SOLUTION INTRAMUSCULAR; INTRAVENOUS at 15:06

## 2020-05-28 RX ADMIN — OXYCODONE HYDROCHLORIDE AND ACETAMINOPHEN 1 TABLET: 5; 325 TABLET ORAL at 17:30

## 2020-05-28 RX ADMIN — LABETALOL 20 MG/4 ML (5 MG/ML) INTRAVENOUS SYRINGE 5 MG: at 17:58

## 2020-05-28 RX ADMIN — SODIUM CHLORIDE 125 ML/HR: 0.9 INJECTION, SOLUTION INTRAVENOUS at 14:25

## 2020-05-28 RX ADMIN — VANCOMYCIN HYDROCHLORIDE 1500 MG: 1 INJECTION, POWDER, LYOPHILIZED, FOR SOLUTION INTRAVENOUS at 14:55

## 2020-05-29 LAB
ATRIAL RATE: 75 BPM
P AXIS: 17 DEGREES
PR INTERVAL: 172 MS
QRS AXIS: 11 DEGREES
QRSD INTERVAL: 92 MS
QT INTERVAL: 428 MS
QTC INTERVAL: 477 MS
T WAVE AXIS: 67 DEGREES
VENTRICULAR RATE: 75 BPM

## 2020-05-29 PROCEDURE — 93010 ELECTROCARDIOGRAM REPORT: CPT | Performed by: INTERNAL MEDICINE

## 2020-06-03 ENCOUNTER — OFFICE VISIT (OUTPATIENT)
Dept: PODIATRY | Facility: CLINIC | Age: 55
End: 2020-06-03

## 2020-06-03 VITALS — WEIGHT: 274.4 LBS | BODY MASS INDEX: 44.1 KG/M2 | HEIGHT: 66 IN

## 2020-06-03 DIAGNOSIS — Z89.421 HISTORY OF AMPUTATION OF LESSER TOE OF RIGHT FOOT (HCC): ICD-10-CM

## 2020-06-03 DIAGNOSIS — E11.39 TYPE 2 DIABETES MELLITUS WITH OTHER OPHTHALMIC COMPLICATION, UNSPECIFIED WHETHER LONG TERM INSULIN USE (HCC): ICD-10-CM

## 2020-06-03 DIAGNOSIS — Z98.890 S/P FOOT SURGERY, RIGHT: Primary | ICD-10-CM

## 2020-06-03 PROCEDURE — 99024 POSTOP FOLLOW-UP VISIT: CPT | Performed by: PODIATRIST

## 2020-06-10 ENCOUNTER — OFFICE VISIT (OUTPATIENT)
Dept: PODIATRY | Facility: CLINIC | Age: 55
End: 2020-06-10

## 2020-06-10 VITALS
BODY MASS INDEX: 43.81 KG/M2 | DIASTOLIC BLOOD PRESSURE: 72 MMHG | WEIGHT: 272.6 LBS | HEART RATE: 77 BPM | SYSTOLIC BLOOD PRESSURE: 145 MMHG | HEIGHT: 66 IN

## 2020-06-10 DIAGNOSIS — Z98.890 S/P FOOT SURGERY, RIGHT: Primary | ICD-10-CM

## 2020-06-10 PROCEDURE — 99024 POSTOP FOLLOW-UP VISIT: CPT | Performed by: PODIATRIST

## 2020-06-17 ENCOUNTER — OFFICE VISIT (OUTPATIENT)
Dept: PODIATRY | Facility: CLINIC | Age: 55
End: 2020-06-17
Payer: MEDICARE

## 2020-06-17 VITALS
BODY MASS INDEX: 43.2 KG/M2 | HEART RATE: 79 BPM | SYSTOLIC BLOOD PRESSURE: 109 MMHG | DIASTOLIC BLOOD PRESSURE: 77 MMHG | WEIGHT: 268.8 LBS | HEIGHT: 66 IN

## 2020-06-17 DIAGNOSIS — B35.1 TINEA UNGUIUM: ICD-10-CM

## 2020-06-17 DIAGNOSIS — E11.42 DIABETIC POLYNEUROPATHY ASSOCIATED WITH TYPE 2 DIABETES MELLITUS (HCC): ICD-10-CM

## 2020-06-17 DIAGNOSIS — L84 CORNS AND CALLOSITIES: Primary | ICD-10-CM

## 2020-06-17 PROBLEM — M86.471 CHRONIC OSTEOMYELITIS OF RIGHT FOOT WITH DRAINING SINUS (HCC): Status: RESOLVED | Noted: 2020-05-13 | Resolved: 2020-06-17

## 2020-06-17 PROCEDURE — 11720 DEBRIDE NAIL 1-5: CPT | Performed by: PODIATRIST

## 2020-06-17 PROCEDURE — RECHECK: Performed by: PODIATRIST

## 2020-06-17 PROCEDURE — 11055 PARING/CUTG B9 HYPRKER LES 1: CPT | Performed by: PODIATRIST

## 2020-08-07 ENCOUNTER — TELEPHONE (OUTPATIENT)
Dept: INTERVENTIONAL RADIOLOGY/VASCULAR | Facility: HOSPITAL | Age: 55
End: 2020-08-07

## 2020-08-12 ENCOUNTER — OFFICE VISIT (OUTPATIENT)
Dept: PODIATRY | Facility: CLINIC | Age: 55
End: 2020-08-12
Payer: MEDICARE

## 2020-08-12 ENCOUNTER — HOSPITAL ENCOUNTER (OUTPATIENT)
Dept: INTERVENTIONAL RADIOLOGY/VASCULAR | Facility: HOSPITAL | Age: 55
Discharge: HOME | End: 2020-08-12
Attending: INTERNAL MEDICINE | Admitting: STUDENT IN AN ORGANIZED HEALTH CARE EDUCATION/TRAINING PROGRAM
Payer: MEDICARE

## 2020-08-12 VITALS
HEART RATE: 80 BPM | OXYGEN SATURATION: 93 % | HEIGHT: 66 IN | BODY MASS INDEX: 42.59 KG/M2 | DIASTOLIC BLOOD PRESSURE: 76 MMHG | TEMPERATURE: 97.6 F | SYSTOLIC BLOOD PRESSURE: 181 MMHG | WEIGHT: 265 LBS | RESPIRATION RATE: 18 BRPM

## 2020-08-12 VITALS — TEMPERATURE: 98 F | WEIGHT: 280 LBS | HEIGHT: 66 IN | BODY MASS INDEX: 45 KG/M2

## 2020-08-12 DIAGNOSIS — E11.42 DIABETIC POLYNEUROPATHY ASSOCIATED WITH TYPE 2 DIABETES MELLITUS (HCC): Primary | ICD-10-CM

## 2020-08-12 DIAGNOSIS — N18.6 ESRD (END STAGE RENAL DISEASE) (HCC): ICD-10-CM

## 2020-08-12 DIAGNOSIS — B35.1 TINEA UNGUIUM: ICD-10-CM

## 2020-08-12 LAB — GLUCOSE SERPL-MCNC: 369 MG/DL (ref 65–140)

## 2020-08-12 PROCEDURE — C1769 GUIDE WIRE: HCPCS

## 2020-08-12 PROCEDURE — 99152 MOD SED SAME PHYS/QHP 5/>YRS: CPT | Performed by: STUDENT IN AN ORGANIZED HEALTH CARE EDUCATION/TRAINING PROGRAM

## 2020-08-12 PROCEDURE — 82948 REAGENT STRIP/BLOOD GLUCOSE: CPT

## 2020-08-12 PROCEDURE — C1725 CATH, TRANSLUMIN NON-LASER: HCPCS

## 2020-08-12 PROCEDURE — 36902 INTRO CATH DIALYSIS CIRCUIT: CPT | Performed by: STUDENT IN AN ORGANIZED HEALTH CARE EDUCATION/TRAINING PROGRAM

## 2020-08-12 PROCEDURE — C1894 INTRO/SHEATH, NON-LASER: HCPCS

## 2020-08-12 PROCEDURE — 36902 INTRO CATH DIALYSIS CIRCUIT: CPT

## 2020-08-12 PROCEDURE — 76937 US GUIDE VASCULAR ACCESS: CPT | Performed by: STUDENT IN AN ORGANIZED HEALTH CARE EDUCATION/TRAINING PROGRAM

## 2020-08-12 PROCEDURE — 99152 MOD SED SAME PHYS/QHP 5/>YRS: CPT

## 2020-08-12 PROCEDURE — 11721 DEBRIDE NAIL 6 OR MORE: CPT | Performed by: PODIATRIST

## 2020-08-12 PROCEDURE — 99153 MOD SED SAME PHYS/QHP EA: CPT

## 2020-08-12 RX ORDER — MIDAZOLAM HYDROCHLORIDE 2 MG/2ML
INJECTION, SOLUTION INTRAMUSCULAR; INTRAVENOUS CODE/TRAUMA/SEDATION MEDICATION
Status: COMPLETED | OUTPATIENT
Start: 2020-08-12 | End: 2020-08-12

## 2020-08-12 RX ORDER — LIDOCAINE WITH 8.4% SOD BICARB 0.9%(10ML)
SYRINGE (ML) INJECTION CODE/TRAUMA/SEDATION MEDICATION
Status: COMPLETED | OUTPATIENT
Start: 2020-08-12 | End: 2020-08-12

## 2020-08-12 RX ORDER — FENTANYL CITRATE 50 UG/ML
INJECTION, SOLUTION INTRAMUSCULAR; INTRAVENOUS CODE/TRAUMA/SEDATION MEDICATION
Status: COMPLETED | OUTPATIENT
Start: 2020-08-12 | End: 2020-08-12

## 2020-08-12 RX ADMIN — FENTANYL CITRATE 25 MCG: 50 INJECTION INTRAMUSCULAR; INTRAVENOUS at 09:07

## 2020-08-12 RX ADMIN — MIDAZOLAM HYDROCHLORIDE 0.5 MG: 1 INJECTION, SOLUTION INTRAMUSCULAR; INTRAVENOUS at 09:07

## 2020-08-12 RX ADMIN — Medication 8 ML: at 09:02

## 2020-08-12 NOTE — BRIEF OP NOTE (RAD/CATH)
IR FISTULA/GRAFTOGRAM Procedure Note    PATIENT NAME: Josef William  : 1965  MRN: [id number]    Pre-op Diagnosis:   1  ESRD (end stage renal disease) ([facility] )      Post-op Diagnosis:   1  ESRD (end stage renal disease) [facility])        Surgeon:   Jeancarlos Benton MD  Assistants:     No qualified resident was available, Resident is only observing    Estimated Blood Loss: 5 ml  Findings:   fistulagram showed mild outflow basilic and cephalic vein stenosis, treated with 9 mm and 7 mm HPB POBA, respectively  Specimens: None  Complications:  None      Anesthesia: Conscious sedation    Jeancarlos Benton MD     Date: 2020  Time: 9:41 AM

## 2020-08-12 NOTE — PROGRESS NOTES
Interventional Radiology Preprocedure Note    History/Indication for procedure:   Haleigh Cummins is a 50 y o  female with a PMH of ESRD on HD dialyzed through a L RCF who presents for fistulagraphy for the indication of prolonged bleeding      Relevant past medical history:    Past Medical History:   Diagnosis Date    Abnormal uterine bleeding (AUB)     Anxiety     Arthritis     Chronic kidney disease     Claustrophobia     Diabetes mellitus ([address] )     Disease of thyroid gland     DVT (deep venous thrombosis) (HCC)     End stage kidney disease (HCC)     Foot ulcer due to secondary DM ([address] )     Hemodialysis patient ([address] )     Tues, Thurs, Sat    Hypertension     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[address] )     right eye    Morbid obesity ([address] )     Osteomyelitis of fifth toe of right foot (HCC)     Panic attacks     Pulmonary embolism (HCC)     Reflux esophagitis     Thrombophlebitis of saphenous vein     Warfarin anticoagulation      Patient Active Problem List   Diagnosis    Hypertension    History of DVT (deep vein thrombosis)    Abnormal uterine bleeding    Glaucoma    ESRD on hemodialysis (HCC)    Anxiety disorder    Type 2 diabetes mellitus (HCC)    Knee pain    Ambulatory dysfunction    End stage renal disease (HCC)    Hemodialysis status (HCC)    Primary osteoarthritis of right knee    Esophageal reflux    Colon cancer screening    Gastroesophageal reflux disease    Meningioma (HCC)    Nausea & vomiting    Secondary hyperparathyroidism of renal origin ([address] )    Other chest pain    Hyperkalemia    Chronic anemia    Right foot ulcer, with fat layer exposed ([address] )    Hyponatremia    Parenchymal renal hypertension    Candidiasis of breast    Hemodialysis-associated hypotension    Lumbar radiculopathy    Low back pain with sciatica    Flu-like symptoms    Diabetic polyneuropathy associated with type 2 diabetes mellitus ([address] )    Tinea pedis    Encounter for diabetic foot exam ([facility] )    Corns and callosities    History of amputation of lesser toe of right foot ([facility] )    Morbid obesity ([facility] )    Hyperlipemia    History of claustrophobia    Diabetic ulcer of toe of right foot associated with type 2 diabetes mellitus, with fat layer exposed ([facility] )    Allergic rhinitis    Benign neoplasm of skin    Cardiomyopathy ([facility] )    Cervical dysplasia    Chronic endometritis    Complication from renal dialysis device    Concussion without loss of consciousness    Diabetes mellitus with neurological manifestation ([place] [address] )    Hypothyroidism    Legal blindness, as defined in [place] [facility]    Limb pain    Mononeuritis of upper limb, unspecified laterality    Phlebitis and thrombophlebitis of superficial vessels of lower extremities    Pulmonary embolus (HCC)    S/P foot surgery, right    Tinea unguium       /93   Pulse 67   Temp (!) 97 1 °F (36 2 °C)   Resp 20   Ht 5' 6" (1 676 m)   Wt 120 kg (265 lb)   LMP 02/12/2016 (Exact Date)   SpO2 94%   BMI 42 77 kg/m²     Medications:    Inpatient Medications:     Scheduled Medications:      Infusions:  No current facility-administered medications for this encounter  PRN:      Outpatient Medications:  Current Outpatient Medications on File Prior to Encounter   Medication Sig Dispense Refill    acetaminophen (TYLENOL) 325 mg tablet Take 2 tablets (650 mg total) by mouth every 6 (six) hours as needed for mild pain or fever 30 tablet 0    ALPRAZolam (XANAX) 0 25 mg tablet Take 0 25 mg by mouth 2 (two) times a day as needed for anxiety      atorvastatin (LIPITOR) 20 mg tablet Take 20 mg by mouth every evening   0    atropine (ISOPTO ATROPINE) 1 % ophthalmic solution Administer 1 drop to both eyes daily at bedtime       brimonidine (ALPHAGAN P) 0 15 % ophthalmic solution Administer 1 drop to both eyes 2 (two) times a day       CALCIUM CARBONATE PO Take 1,000 mg by mouth daily        carvedilol (COREG) 25 mg tablet Take 25 mg by mouth 2 (two) times a day with meals        cinacalcet (SENSIPAR) 30 mg tablet Take 30 mg by mouth daily      diphenhydrAMINE (BENADRYL) 25 mg capsule Take by mouth as needed for itching        dorzolamide-timolol (COSOPT) 22 3-6 8 MG/ML ophthalmic solution instill 1 drop into both eyes twice a day  0    gabapentin (NEURONTIN) 100 mg capsule Take 3 capsules (300 mg total) by mouth daily at bedtime  0    insulin glargine (LANTUS) 100 units/mL subcutaneous injection Inject 30 Units under the skin daily at bedtime  0    KIONEX 15 GM/60ML suspension Take by mouth Takes as a shake on dialysis days Tues-Thurs_Sat  0    latanoprost (XALATAN) 0 005 % ophthalmic solution Administer 1 drop to both eyes daily at bedtime      levothyroxine 50 mcg tablet Take 50 mcg by mouth daily      LIDOCAINE EX Apply topically Apply to access 1 hour prior to HD      loratadine (CLARITIN) 10 mg tablet Take 10 mg by mouth daily      LORazepam (ATIVAN) 1 mg tablet Take 1 tablet (1 mg total) by mouth once in imaging for anxiety for up to 1 dose 1 tablet 0    losartan (COZAAR) 25 mg tablet Take 1 tablet (25 mg total) by mouth daily Take on NON-Dialysis Days      methazolamide (NEPTAZANE) 25 MG tablet Take 25 mg by mouth 3 (three) times a day        NOVOLOG FLEXPEN 100 units/mL SOPN INJECT 1 TO 5 UNITS SUBCUTANEOUSLY THREE TIMES A DAY BEFORE MELAS AS PER SLIDING SCALE      ondansetron (ZOFRAN) 4 mg tablet Take 1 tablet (4 mg total) by mouth every 8 (eight) hours as needed for nausea or vomiting 12 tablet 0    pantoprazole (PROTONIX) 40 mg tablet Take 1 tablet (40 mg total) by mouth daily in the early morning  0    prednisoLONE acetate (PRED FORTE) 1 % ophthalmic suspension Administer 1 drop to both eyes 4 (four) times a day Patient takes only occasionally      sertraline (ZOLOFT) 50 mg tablet Take 1 tablet (50 mg total) by mouth daily 30 tablet 0    TIMOLOL MALEATE OP Apply 1 drop to eye 2 (two) times a day  torsemide (DEMADEX) 100 mg tablet Take 100 mg by mouth every 12 (twelve) hours   0    warfarin (COUMADIN) 3 mg tablet Take 9 mg by mouth daily Takes 9 mg Monday thru Fri  And 12 mg Sat and Sun   albuterol (ProAir HFA) 90 mcg/act inhaler Inhale 2 puffs every 6 (six) hours as needed for wheezing or shortness of breath 8 5 g 0    B Complex-C-Folic Acid (TRIPHROCAPS) 1 MG CAPS Take 1 capsule (1 mg total) by mouth daily (Patient not taking: Reported on 6/17/2020) 30 capsule 0    diclofenac sodium (VOLTAREN) 1 % Apply 2 g topically 4 (four) times a day 200 g 0    Melatonin 3 MG CAPS Take 10 mg by mouth daily at bedtime        nystatin (MYCOSTATIN) powder Apply topically 2 (two) times a day 15 g 0    Podiatric Products (FLEXITOL HEEL BALM) OINT Apply topically once for 1 dose 112 g 2    senna (SENOKOT) 8 6 mg Take 2 tablets (17 2 mg total) by mouth daily at bedtime as needed for constipation 30 each 0    sevelamer carbonate (RENVELA) 800 mg tablet TAKE 3 TABLETS BY MOUTH THREE TIMES A DAY WITH MEALS AND 2 TABLETS WITH SNACKS   traMADol (ULTRAM) 50 mg tablet Take 1 tablet (50 mg total) by mouth every 12 (twelve) hours as needed for moderate pain for up to 10 doses (Patient not taking: Reported on 6/17/2020) 10 tablet 0    warfarin (COUMADIN) 2 mg tablet Take a 2 mg tablet in addition to a 10 mg tablet for a total of 12 mg daily until INR is therapeutic 7 tablet 0     No current facility-administered medications on file prior to encounter  Allergies   Allergen Reactions    Iodinated Diagnostic Agents Itching       Anticoagulants: none    ASA classification: ASA 3 - Patient with moderate systemic disease with functional limitations    Airway Assessment: II (hard and soft palate, upper portion of tonsils anduvula visible)    Relevant family history: None    Relevant review of systems: None    Prior sedation/anesthesia: yes    Can the patient lie flat? Yes     Does patient have sleep apnea? No    If yes, does patient use CPAP? not applicable    NPO Status: yes    Labs:   CBC with diff:   Lab Results   Component Value Date    WBC 6 12 05/20/2020    HGB 10 3 (L) 05/20/2020    HCT 31 3 (L) 05/20/2020    MCV 96 05/20/2020     05/20/2020    MCH 31 6 05/20/2020    MCHC 32 9 05/20/2020    RDW 13 2 05/20/2020    MPV 10 9 05/20/2020    NRBC 0 05/20/2020     BMP/CMP:  Lab Results   Component Value Date     10/11/2015    K 5 0 05/20/2020    K 4 7 10/11/2015     05/20/2020    CL 94 (L) 10/11/2015    CO2 27 05/20/2020    CO2 27 07/30/2018    ANIONGAP 11 10/11/2015    BUN 48 (H) 05/20/2020    BUN 64 (H) 10/11/2015    CREATININE 7 06 (H) 05/20/2020    CREATININE 6 68 (H) 10/11/2015    GLUCOSE 196 (H) 07/30/2018    GLUCOSE 198 (H) 10/11/2015    CALCIUM 8 5 05/20/2020    CALCIUM 9 1 10/11/2015    AST 31 02/20/2020    AST 5 09/29/2015    ALT 19 02/20/2020    ALT 13 09/29/2015    ALKPHOS 120 (H) 02/20/2020    ALKPHOS 95 09/29/2015    PROT 6 7 09/29/2015    BILITOT 0 36 09/29/2015    EGFR 6 05/20/2020    EGFR 5 07/30/2018   ,     Coags:   Lab Results   Component Value Date    PTT 25 09/28/2019    PTT 68 (H) 10/11/2015    INR 2 06 (H) 05/11/2020    INR 2 31 (H) 12/31/2015   ,          Relevant imaging studies:   Reviewed  Directed physical examination:  CONSTITUTIONAL: The patient appeared well in no acute distress  NEUROLOGICAL: alert, awake, answering questions appropriately  PSYCHIATRIC: Affect normal   PULMONARY: No respiratory distress  GASTROINTESTINAL: abdomen was soft, round, nontender  EXTREMITIES: No cyanosis  L RCF has minimally pulsatile thrill  No erythema or eschar  Assessment/Plan: For diagnostic fistulagram and possible treatment  Sedation/Anesthesia plan: Moderate sedation will be used as needed for procedure      Consent with alternatives to the procedure, risks and benefits have been explained and discussed with the patient/patient's family: yes    During the informed consent process, the following was discussed, and the patient was educated concerning paclitaxel coated balloons and stents, which may be appropriate for the patient's up-coming fistulagram procedure: Analysis of randomized trials suggest a possible increased death rate after two years in patients treated with paclitaxel-coated balloons and paclitaxel-eluting stents compared to patients treated with control devices (non-coated balloons or bare metal stents) specifically in patients with lower extremity claudication  This has not been corroborated for dialysis access circuits  The specific cause for this observation is yet to be determined  Currently, the FDA believes that the benefits continue to outweigh the risks for approved paclitaxel-coated balloons and paclitaxel-eluting stents when used in accordance with their indications for use  The patient gave informed consent for the use of these devices if appropriately indicated for treatment

## 2020-08-12 NOTE — NURSING NOTE
Positive bruit/thrill  Dr Eli Monsalve was here and removed pressure device  No bleeding or hematoma  Dry dressing applied with transparent covering  Ambulated to the bathroom and voided  Call bell in reach

## 2020-08-12 NOTE — NURSING NOTE
Returned from IR after procedure on left arm dialysis access site  Pressure device and dressing dry and intact  No bleeding or hematoma  Stated that 3 fingers on her left hand are "numb now"  Fingers are warm to touch  Brisk capillary refill  No discoloration  Pain 7/10 in the area of procedure  Positive bruit/thrill  Respirations easy and non labored  Continue to monitor  Precautions maintained

## 2020-08-12 NOTE — NURSING NOTE
Positive bruit/thrill  No bleeding or hematoma  Dressing remains dry and intact  Pain decreased to 5/10 without medication  Numbness in fingers resolving  Dr Lico Rogers aware of pain and numbness

## 2020-08-12 NOTE — DISCHARGE INSTRUCTIONS
Fistulagram   WHAT YOU NEED TO KNOW:   Your arm or leg my  be sore, swollen, and bruised after the procedure  This is normal and should get better in a few days  DISCHARGE INSTRUCTIONS:     Contact Interventional Radiology at [phone] [place] PATIENTS: Contact Interventional Radiology at [phone]) Veena Isaias PATIENTS: Contact Interventional Radiology at [phone]) if:    · You have a fever or chills  · Your puncture site is red, swollen, or draining pus  · You have nausea or are vomiting  · Your skin is itchy, swollen, or you have a rash  · You cannot feel a thrill over your graft or fistula  · You have questions or concerns about your condition or care  Seek care immediately if:     · You have bleeding that does not stop after 10 minutes of holding firm, direct pressure over the puncture site  · Blood soaks through your bandage  · Your hand or foot closest to the graft or fistula feels cold, painful, or numb  · Your hand or foot closest to the graft or fistula is pale or blue  · You have trouble moving your arm or leg closest to the graft or fistula  · Your bruise suddenly gets bigger  Care for your wound as directed:  Remove the bandage in 4 to 6 hours or as         directed  Wash the area once a day with soap and water  Gently pat the area dry  Apply firm, steady pressure to the puncture site if it bleeds  Use a clean gauze or towel to hold pressure for 10 to 15 minutes  Call 911 if you cannot stop the bleeding or the bleeding gets heavier  Feel for a thrill once a day or as directed  Place your index and second finger over your fistula or graft as directed  You should feel a vibration  The vibration means that blood is flowing through your graft or fistula correctly  Rest your arm or leg as directed  Do not lift anything heavier than 5 pounds or do strenuous activity for 24 hours  Prevent damage to your graft or fistula    Do not wear tight-fitting clothing over your graft or fistula  Do not wear tight jewelry on the arm or leg with the graft or fistula  Tell healthcare providers not to do, IVs, blood draws, and blood pressure readings in the arm with your graft or fistula  Do not allow flu shots or vaccinations in your arm with your graft or fistula      Follow up with your healthcare provider as directed

## 2020-08-14 ENCOUNTER — HOSPITAL ENCOUNTER (EMERGENCY)
Facility: HOSPITAL | Age: 55
Discharge: HOME | End: 2020-08-14
Attending: EMERGENCY MEDICINE | Admitting: EMERGENCY MEDICINE
Payer: MEDICARE

## 2020-08-14 ENCOUNTER — APPOINTMENT (EMERGENCY)
Dept: RADIOLOGY | Facility: HOSPITAL | Age: 55
End: 2020-08-14
Payer: MEDICARE

## 2020-08-14 VITALS
SYSTOLIC BLOOD PRESSURE: 123 MMHG | TEMPERATURE: 98 F | OXYGEN SATURATION: 97 % | HEART RATE: 74 BPM | DIASTOLIC BLOOD PRESSURE: 58 MMHG | RESPIRATION RATE: 18 BRPM
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
## 2020-08-14 DIAGNOSIS — R93.89 ABNORMAL FINDINGS ON DIAGNOSTIC IMAGING OF BODY STRUCTURES: ICD-10-CM

## 2020-08-14 LAB
INR PPP: 6.89 (ref 0.84–1.19)
PROTHROMBIN TIME: 59 SECONDS (ref 11.6–14.5)

## 2020-08-14 PROCEDURE — 73130 X-RAY EXAM OF HAND: CPT

## 2020-08-14 PROCEDURE — 70450 CT HEAD/BRAIN W/O DYE: CPT

## 2020-08-14 PROCEDURE — 36415 COLL VENOUS BLD VENIPUNCTURE: CPT | Performed by: EMERGENCY MEDICINE

## 2020-08-14 PROCEDURE — 99285 EMERGENCY DEPT VISIT HI MDM: CPT | Performed by: EMERGENCY MEDICINE

## 2020-08-14 PROCEDURE — 85610 PROTHROMBIN TIME: CPT | Performed by: EMERGENCY MEDICINE

## 2020-08-14 PROCEDURE — 73564 X-RAY EXAM KNEE 4 OR MORE: CPT

## 2020-08-14 PROCEDURE — 96372 THER/PROPH/DIAG INJ SC/IM: CPT

## 2020-08-14 PROCEDURE — 73110 X-RAY EXAM OF WRIST: CPT

## 2020-08-14 PROCEDURE — 99284 EMERGENCY DEPT VISIT MOD MDM: CPT

## 2020-08-14 PROCEDURE — G1004 CDSM NDSC: HCPCS

## 2020-08-14 PROCEDURE — 72125 CT NECK SPINE W/O DYE: CPT

## 2020-08-14 RX ORDER — KETOROLAC TROMETHAMINE 30 MG/ML
15 INJECTION, SOLUTION INTRAMUSCULAR; INTRAVENOUS ONCE
Status: COMPLETED | OUTPATIENT
Start: 2020-08-14 | End: 2020-08-14

## 2020-08-14 RX ADMIN — KETOROLAC TROMETHAMINE 15 MG: 30 INJECTION, SOLUTION INTRAMUSCULAR at 16:26

## 2020-08-14 NOTE — ED ATTENDING ATTESTATION
8/14/2020  Matt Tan DO saw and evaluated the patient  I have discussed the patient with the resident/non-physician practitioner and agree with the resident's/non-physician practitioner's findings, Plan of Care, and MDM as documented in the resident's/non-physician practitioner's note, except where noted  All available labs and Radiology studies were reviewed  I was present for key portions of any procedure(s) performed by the resident/non-physician practitioner and I was immediately available to provide assistance  At this point I agree with the current assessment done in the Emergency Department  I have conducted an independent evaluation of this patient a history and physical is as follows:    49 yo female fell 2 days ago, struck her forehead  No LOC, c/o mild HA since that time but primarily c/o L wrist pain which is worse with movement or palpation  also c/o B knee pain  Denies focal weakness/numbness/tingling  ESRD on HD, recent fistula intervention for occlusion    Imp: closed head injury on warfarin  L wrist pain likely strain plan: CT head, films L wrist, hand, B knees        ED Course         Critical Care Time  Procedures

## 2020-08-14 NOTE — DISCHARGE INSTRUCTIONS
You were seen today in the Emergency Department for pain after a fall  Your workup included x-rays, CT scans, and an INR check  PLEASE HOLD YOUR AM DOSE OF WARFARIN (COUMADIN)  YOUR CT SCAN HAD AN ABNORMAL FINDING, DETAILED BELOW  PLEASE DISCUSS THIS WITH YOUR PRIMARY CARE DOCTOR WITHIN THE NEXT 1-2 DAYS TO DETERMINE APPROPRIATE FOLLOW-UP  "New, lytic lesion within the left lateral mass of C6  While this may represent a large subchondral cyst from the adjacent superior articular facet, differential also includes metastases, and myeloma  Recommend either follow-up MRI cervical spine or,  bone scan"    Please follow up with your Primary Care Provider in the next 1-2 days to recheck your symptoms and to follow up on your visit to the Emergency Department today  Please return to the Emergency Department if you have fevers, chills, chest pain, shortness of breath, are unable to eat or drink, or have any other symptoms that concern you  Please look this over and let your nurse and/or me know if you have any further questions before you leave

## 2020-08-14 NOTE — ED PROVIDER NOTES
History  Chief Complaint   Patient presents with    Fall     fell on tuesday + head strike and LFA fistula feels warm and faint     51-year-old female with end-stage renal disease on dialysis, on warfarin presenting for evaluation of a fall 2 days ago  Patient states it was mechanical, she tripped and fell forward onto her hands and knees and somehow managing to hit the right side of her forehead  No loss of consciousness  Went to dialysis again today and states that she was experiencing left-sided hand/wrist pain, have lateral knee pain and headache  Stating that she needs stronger pain medications, that Tylenol does not work for her  No neurologic derangement such as numbness, tingling, weakness, changes in gait  Patient states that she is legally blind at baseline  Denies significant bleeding from anywhere, at times has some oozing from her left arm fistula after dialysis, but states this is normal   States her warfarin was charged recently was within normal limits  Denies any chest pain, shortness of breath, abdominal pain, nausea, vomiting, diarrhea  History provided by:  Patient   used: No        Prior to Admission Medications   Prescriptions Last Dose Informant Patient Reported? Taking? ALPRAZolam (XANAX) 0 25 mg tablet  Self Yes No   Sig: Take 0 25 mg by mouth 2 (two) times a day as needed for anxiety   B Complex-C-Folic Acid (TRIPHROCAPS) 1 MG CAPS  Self No No   Sig: Take 1 capsule (1 mg total) by mouth daily   Patient not taking: Reported on 6/17/2020   CALCIUM CARBONATE PO  Self Yes No   Sig: Take 1,000 mg by mouth daily     KIONEX 15 GM/60ML suspension  Self Yes No   Sig: Take by mouth Takes as a shake on dialysis days Tues-Thurs_Sat   LIDOCAINE EX  Self Yes No   Sig: Apply topically Apply to access 1 hour prior to HD   LORazepam (ATIVAN) 1 mg tablet   No No   Sig: Take 1 tablet (1 mg total) by mouth once in imaging for anxiety for up to 1 dose   Melatonin 3 MG CAPS Self Yes No   Sig: Take 10 mg by mouth daily at bedtime     NOVOLOG FLEXPEN 100 units/mL SOPN   Yes No   Sig: INJECT 1 TO 5 UNITS SUBCUTANEOUSLY THREE TIMES A DAY BEFORE MELAS AS PER SLIDING SCALE   Podiatric Products (FLEXITOL HEEL BALM) OINT   No No   Sig: Apply topically once for 1 dose   TIMOLOL MALEATE OP  Self Yes No   Sig: Apply 1 drop to eye 2 (two) times a day   acetaminophen (TYLENOL) 325 mg tablet   No No   Sig: Take 2 tablets (650 mg total) by mouth every 6 (six) hours as needed for mild pain or fever   albuterol (ProAir HFA) 90 mcg/act inhaler   No No   Sig: Inhale 2 puffs every 6 (six) hours as needed for wheezing or shortness of breath   atorvastatin (LIPITOR) 20 mg tablet  Self Yes No   Sig: Take 20 mg by mouth every evening    atropine (ISOPTO ATROPINE) 1 % ophthalmic solution   Yes No   Sig: Administer 1 drop to both eyes daily at bedtime    brimonidine (ALPHAGAN P) 0 15 % ophthalmic solution  Self Yes No   Sig: Administer 1 drop to both eyes 2 (two) times a day    carvedilol (COREG) 25 mg tablet  Self Yes No   Sig: Take 25 mg by mouth 2 (two) times a day with meals     cinacalcet (SENSIPAR) 30 mg tablet  Self Yes No   Sig: Take 30 mg by mouth daily   diclofenac sodium (VOLTAREN) 1 %   No No   Sig: Apply 2 g topically 4 (four) times a day   diphenhydrAMINE (BENADRYL) 25 mg capsule  Self Yes No   Sig: Take by mouth as needed for itching     dorzolamide-timolol (COSOPT) 22 3-6 8 MG/ML ophthalmic solution  Self Yes No   Sig: instill 1 drop into both eyes twice a day   gabapentin (NEURONTIN) 100 mg capsule   No No   Sig: Take 3 capsules (300 mg total) by mouth daily at bedtime   insulin glargine (LANTUS) 100 units/mL subcutaneous injection  Self No No   Sig: Inject 30 Units under the skin daily at bedtime   latanoprost (XALATAN) 0 005 % ophthalmic solution  Self Yes No   Sig: Administer 1 drop to both eyes daily at bedtime   levothyroxine 50 mcg tablet  Self Yes No   Sig: Take 50 mcg by mouth daily loratadine (CLARITIN) 10 mg tablet   Yes No   Sig: Take 10 mg by mouth daily   losartan (COZAAR) 25 mg tablet  Self No No   Sig: Take 1 tablet (25 mg total) by mouth daily Take on NON-Dialysis Days   methazolamide (NEPTAZANE) 25 MG tablet  Self Yes No   Sig: Take 25 mg by mouth 3 (three) times a day     nystatin (MYCOSTATIN) powder   No No   Sig: Apply topically 2 (two) times a day   ondansetron (ZOFRAN) 4 mg tablet  Self No No   Sig: Take 1 tablet (4 mg total) by mouth every 8 (eight) hours as needed for nausea or vomiting   pantoprazole (PROTONIX) 40 mg tablet   No No   Sig: Take 1 tablet (40 mg total) by mouth daily in the early morning   prednisoLONE acetate (PRED FORTE) 1 % ophthalmic suspension  Self Yes No   Sig: Administer 1 drop to both eyes 4 (four) times a day Patient takes only occasionally   senna (SENOKOT) 8 6 mg   No No   Sig: Take 2 tablets (17 2 mg total) by mouth daily at bedtime as needed for constipation   sertraline (ZOLOFT) 50 mg tablet  Self No No   Sig: Take 1 tablet (50 mg total) by mouth daily   sevelamer carbonate (RENVELA) 800 mg tablet   Yes No   Sig: TAKE 3 TABLETS BY MOUTH THREE TIMES A DAY WITH MEALS AND 2 TABLETS WITH SNACKS    torsemide (DEMADEX) 100 mg tablet  Self Yes No   Sig: Take 100 mg by mouth every 12 (twelve) hours    traMADol (ULTRAM) 50 mg tablet   No No   Sig: Take 1 tablet (50 mg total) by mouth every 12 (twelve) hours as needed for moderate pain for up to 10 doses   Patient not taking: Reported on 6/17/2020   warfarin (COUMADIN) 2 mg tablet   No No   Sig: Take a 2 mg tablet in addition to a 10 mg tablet for a total of 12 mg daily until INR is therapeutic   warfarin (COUMADIN) 3 mg tablet   Yes No   Sig: Take 9 mg by mouth daily Takes 9 mg Monday thru Fri  And 12 mg Sat and Sun        Facility-Administered Medications: None       Past Medical History:   Diagnosis Date    Abnormal uterine bleeding (AUB)     Anxiety     Arthritis     Chronic kidney disease     Claustrophobia     Diabetes mellitus ([facility]     Disease of thyroid gland     DVT (deep venous thrombosis) (HCC)     End stage kidney disease (HCC)     Foot ulcer due to secondary DM ([facility]     Hemodialysis patient ([facility] )     Tues, Thurs, Sat    Hypertension     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[facility] )     right eye    Morbid obesity ([facility]     Osteomyelitis of fifth toe of right foot ([facility]     Panic attacks     Pulmonary embolism (HCC)     Reflux esophagitis     Thrombophlebitis of saphenous vein     Warfarin anticoagulation        Past Surgical History:   Procedure Laterality Date    AMPUTATION      r 4th toe    ARTERIOVENOUS GRAFT PLACEMENT      CATARACT EXTRACTION Bilateral     CERVICAL BIOPSY  W/ LOOP ELECTRODE EXCISION       SECTION      DIALYSIS FISTULA CREATION      DILATION AND CURETTAGE OF UTERUS      ENDOMETRIAL ABLATION W/ NOVASURE      EYE SURGERY      HYSTERECTOMY      @ age 57 (complete)    IR FISTULA/GRAFTOGRAM  10/11/2019    IR FISTULA/GRAFTOGRAM  2020    OOPHORECTOMY Bilateral     @ age 57    PERICARDIUM SURGERY      MO AMPUTATION TOE,MT-P JT Right 2020    Procedure: AMPUTATION TOE- 5th;  Surgeon: Ganga Garcia DPM;  Location: [facility];  Service: Podiatry    MO COLONOSCOPY FLX DX W/COLLJ SPEC WHEN PFRMD N/A 3/13/2019    Procedure: COLONOSCOPY;  Surgeon: Lety Yang MD;  Location: [facility]; Service: Gastroenterology    MO ESOPHAGOGASTRODUODENOSCOPY TRANSORAL DIAGNOSTIC N/A 3/13/2019    Procedure: ESOPHAGOGASTRODUODENOSCOPY (EGD); Surgeon: Lety Yang MD;  [facility]: [facility];   Service: Gastroenterology    MO LAPAROSCOPY W TOT HYSTERECT UTERUS 250 GRAM OR LESS N/A 2016    Procedure: HYSTERECTOMY LAPAROSCOPIC TOTAL [facility]), with bilateral salpingectomy;  Surgeon: Aly Suresh DO;  [facility];  Service: Gynecology    THROMBECTOMY / ARTERIOVENOUS GRAFT REVISION      TOE SURGERY Right     removal of the 4th toe       Family History   Problem Relation Age of Onset    Heart disease Family     Diabetes Family     Heart disease Mother     Cancer Brother         neck    Throat cancer Brother     Ovarian cancer Maternal Aunt 36     I have reviewed and agree with the history as documented  E-Cigarette/Vaping    E-Cigarette Use Never User      E-Cigarette/Vaping Substances    Nicotine No     THC No     CBD No     Flavoring No     Other No     Unknown No      Social History     Tobacco Use    Smoking status: Never Smoker    Smokeless tobacco: Never Used   Substance Use Topics    Alcohol use: Never     Alcohol/week: 0 0 standard drinks     Frequency: Never     Drinks per session: Patient refused     Binge frequency: Patient refused    Drug use: No        Review of Systems   Constitutional: Negative for chills, fatigue and fever  HENT: Negative for congestion and rhinorrhea  Respiratory: Negative for cough, shortness of breath and wheezing  Cardiovascular: Negative for chest pain and palpitations  Gastrointestinal: Negative for diarrhea, nausea and vomiting  Genitourinary: Negative for flank pain  Musculoskeletal: Positive for gait problem  Negative for back pain and myalgias  Skin: Negative for pallor and rash  Neurological: Positive for headaches  Negative for dizziness, light-headedness and numbness  Psychiatric/Behavioral: Negative for confusion  The patient is not nervous/anxious          Physical Exam  ED Triage Vitals   Temperature Pulse Respirations Blood Pressure SpO2   08/14/20 1809 08/14/20 1549 08/14/20 1549 08/14/20 1549 08/14/20 1549   98 °F (36 7 °C) 73 16 123/58 98 %      Temp src Heart Rate Source Patient Position - Orthostatic VS BP Location FiO2 (%)   -- 08/14/20 1806 -- -- --    Monitor         Pain Score       08/14/20 1549       9             Orthostatic Vital Signs  Vitals:    08/14/20 1549 08/14/20 1806   BP: 123/58    Pulse: 73 74       Physical Exam  Vitals signs and nursing note reviewed  Constitutional:       Comments: Chronically ill-appearing   HENT:      Head: Normocephalic  Comments: Right frontal forehead hematoma without overlying laceration or significant abrasions     Nose: Nose normal       Mouth/Throat:      Mouth: Mucous membranes are moist       Pharynx: Oropharynx is clear  Eyes:      Extraocular Movements: Extraocular movements intact  Conjunctiva/sclera: Conjunctivae normal       Pupils: Pupils are equal, round, and reactive to light  Neck:      Musculoskeletal: Normal range of motion  Cardiovascular:      Rate and Rhythm: Normal rate and regular rhythm  Heart sounds: No murmur  Comments: Left AV fistula with no active bleeding  Bruising over the site  No large hematoma  Pulmonary:      Effort: Pulmonary effort is normal       Breath sounds: No wheezing or rales  Abdominal:      General: Abdomen is flat  There is no distension  Palpations: Abdomen is soft  Tenderness: There is no abdominal tenderness  Musculoskeletal: Normal range of motion  Comments: No cervical, thoracic, lumbar, sacral or pelvic tenderness  Tenderness diffusely at the left dorsal hand   Skin:     General: Skin is warm and dry  Neurological:      General: No focal deficit present  Mental Status: She is alert and oriented to person, place, and time  Cranial Nerves: No cranial nerve deficit  Sensory: No sensory deficit  Motor: No weakness        Gait: Gait normal    Psychiatric:         Mood and Affect: Mood normal          ED Medications  Medications   ketorolac (TORADOL) injection 15 mg (15 mg Intramuscular Given 8/14/20 1626)       Diagnostic Studies  Results Reviewed     Procedure Component Value Units Date/Time    Protime-INR [[id number]]  (Abnormal) Collected:  08/14/20 1603    Lab Status:  Final result Specimen:  Blood from Arm, Right Updated:  08/14/20 1748     Protime 59 0 seconds      INR 6 89                 XR hand 3+ views LEFT   Final Result by Edenilson Orozco MD (08/14 1614)      No acute osseous abnormality  Workstation performed: [id number]         XR wrist 3+ views LEFT   Final Result by Edenilson Orozco MD (08/14 1615)      No acute osseous abnormality  Workstation performed: [id number]         XR knee 4+ vw left injury   Final Result by Lewsi Burt MD (08/14 1617)      No acute osseous abnormality  Workstation performed: [id number]         XR knee 4+ vw right injury   Final Result by Lewis Burt MD (08/14 1619)      No acute osseous abnormality  Degenerative changes as described  Workstation performed: [id number]         CT head without contrast   Final Result by Mary Kitchen MD (08/14 1618)      1  No acute intracranial abnormality  Microangiopathic changes  2   Stable left meningioma                  Workstation performed: [id number]         CT spine cervical without contrast   Final Result by Mary Kitchen MD (08/14 1627)      1  No cervical spine fracture or traumatic malalignment  2   New, lytic lesion within the left lateral mass of C6  While this may represent a large subchondral cyst from the adjacent superior articular facet, differential also includes metastases, and myeloma  Recommend either follow-up MRI cervical spine or    bone scan      The study was marked in EPIC for significant notification  Workstation performed: [id number]               Procedures  Procedures      ED Course       US AUDIT      Most Recent Value   Initial Alcohol Screen: US AUDIT-C    1  How often do you have a drink containing alcohol?  0 Filed at: 08/14/2020 1547   2  How many drinks containing alcohol do you have on a typical day you are drinking? 0 Filed at: 08/14/2020 1547   3a  Male UNDER 65: How often do you have five or more drinks on one occasion? 0 Filed at: 08/14/2020 1547   3b  FEMALE Any Age, or MALE 65+: How often do you have 4 or more drinks on one occassion?   0 Filed at: 08/14/2020 1547   Audit-C Score  0 Filed at: 08/14/2020 1547                  ANGELO/DAST-10      Most Recent Value   How many times in the past year have you    Used an illegal drug or used a prescription medication for non-medical reasons? Never Filed at: 08/14/2020 1547                              [facility]  Number of Diagnoses or Management Options  Abnormal findings on diagnostic imaging of body structures:   Fall, initial encounter:   Diagnosis management comments: 61-year-old female presenting 2 days after a fall, on Coumadin at home  CT head and cervical spine obtained given the hematoma the right frontal forehead and history of head trauma  CT head unremarkable, x-rays of the left hand/wrist and bilateral knees unremarkable as well  At time of sign-out, pending CT cervical spine read  INR pending as well  Expect patient will be able to be discharged  Disposition  Final diagnoses:   Fall, initial encounter   Abnormal findings on diagnostic imaging of body structures - 8/14 CT cervical spine: New, lytic lesion within the left lateral mass of C6  While this may represent a large subchondral cyst from the adjacent superior articular facet, differential also includes metastases, and myeloma  Recommend either follow-up MRI cervical spine or bone scan     Time reflects when diagnosis was documented in both MDM as applicable and the Disposition within this note     Time User Action Codes Description Comment    8/14/2020  4:34 PM Lonzell Paddy Add [[id number]  [id number]] Fall, initial encounter     8/14/2020  4:34 PM Lonzell Paddy Add [R93 [id number]] Abnormal findings on diagnostic imaging of body structures     8/14/2020  4:34 PM Ora Stai [R93 [id number]] Abnormal findings on diagnostic imaging of body structures 8/14 CT cervical spine: New, lytic lesion within the left lateral mass of C6    While this may represent a large subchondral cyst from the adjacent superior articular facet, differential also includes metastases, and myeloma  Recommend either follow-up MRI cervical spine or bone scan      ED Disposition     ED Disposition Condition Date/Time Comment    Discharge Stable Fri Aug 14, 2020  5:57 PM Baylee Loja discharge to home/self care  Return precautions given and questions answered  Follow-up Information    None         Discharge Medication List as of 8/14/2020  5:59 PM      CONTINUE these medications which have NOT CHANGED    Details   acetaminophen (TYLENOL) 325 mg tablet Take 2 tablets (650 mg total) by mouth every 6 (six) hours as needed for mild pain or fever, Starting Thu 2/20/2020, Print      albuterol (ProAir HFA) 90 mcg/act inhaler Inhale 2 puffs every 6 (six) hours as needed for wheezing or shortness of breath, Starting Fri 2/21/2020, Normal      ALPRAZolam (XANAX) 0 25 mg tablet Take 0 25 mg by mouth 2 (two) times a day as needed for anxiety, Historical Med      atorvastatin (LIPITOR) 20 mg tablet Take 20 mg by mouth every evening , Starting Tue 4/24/2018, Historical Med      atropine (ISOPTO ATROPINE) 1 % ophthalmic solution Administer 1 drop to both eyes daily at bedtime , Starting Mon 2/4/2019, Historical Med      B Complex-C-Folic Acid (TRIPHROCAPS) 1 MG CAPS Take 1 capsule (1 mg total) by mouth daily, Starting Sun 4/28/2019, Print      brimonidine (ALPHAGAN P) 0 15 % ophthalmic solution Administer 1 drop to both eyes 2 (two) times a day , Historical Med      CALCIUM CARBONATE PO Take 1,000 mg by mouth daily  , Historical Med      carvedilol (COREG) 25 mg tablet Take 25 mg by mouth 2 (two) times a day with meals  , Historical Med      cinacalcet (SENSIPAR) 30 mg tablet Take 30 mg by mouth daily, Historical Med      diclofenac sodium (VOLTAREN) 1 % Apply 2 g topically 4 (four) times a day, Starting Sun 5/19/2019, Print      diphenhydrAMINE (BENADRYL) 25 mg capsule Take by mouth as needed for itching  , Historical Med      dorzolamide-timolol (COSOPT) 22 3-6 8 MG/ML ophthalmic solution instill 1 drop into both eyes twice a day, Historical Med      gabapentin (NEURONTIN) 100 mg capsule Take 3 capsules (300 mg total) by mouth daily at bedtime, Starting Fri 2/21/2020, No Print      insulin glargine (LANTUS) 100 units/mL subcutaneous injection Inject 30 Units under the skin daily at bedtime, Starting Sat 8/31/2019, Print      KIONEX 15 GM/60ML suspension Take by mouth Takes as a shake on dialysis days Tues-Thurs_Sat, Starting Mon 12/10/2018, Historical Med      latanoprost (XALATAN) 0 005 % ophthalmic solution Administer 1 drop to both eyes daily at bedtime, Historical Med      levothyroxine 50 mcg tablet Take 50 mcg by mouth daily, Historical Med      LIDOCAINE EX Apply topically Apply to access 1 hour prior to HD, Historical Med      loratadine (CLARITIN) 10 mg tablet Take 10 mg by mouth daily, Historical Med      LORazepam (ATIVAN) 1 mg tablet Take 1 tablet (1 mg total) by mouth once in imaging for anxiety for up to 1 dose, Starting Tue 3/31/2020, Normal      losartan (COZAAR) 25 mg tablet Take 1 tablet (25 mg total) by mouth daily Take on NON-Dialysis Days, Starting Wed 5/23/2018, No Print      Melatonin 3 MG CAPS Take 10 mg by mouth daily at bedtime  , Historical Med      methazolamide (NEPTAZANE) 25 MG tablet Take 25 mg by mouth 3 (three) times a day  , Historical Med      NOVOLOG FLEXPEN 100 units/mL SOPN INJECT 1 TO 5 UNITS SUBCUTANEOUSLY THREE TIMES A DAY BEFORE MELAS AS PER SLIDING SCALE, Historical Med      nystatin (MYCOSTATIN) powder Apply topically 2 (two) times a day, Starting Sat 8/31/2019, Print      ondansetron (ZOFRAN) 4 mg tablet Take 1 tablet (4 mg total) by mouth every 8 (eight) hours as needed for nausea or vomiting, Starting Tue 1/22/2019, Normal      pantoprazole (PROTONIX) 40 mg tablet Take 1 tablet (40 mg total) by mouth daily in the early morning, Starting Fri 2/21/2020, No Print      Podiatric Products (FLEXITOL HEEL BALM) OINT Apply topically once for 1 dose, Starting Wed 3/11/2020, Print      prednisoLONE acetate (PRED FORTE) 1 % ophthalmic suspension Administer 1 drop to both eyes 4 (four) times a day Patient takes only occasionally, Historical Med      senna (SENOKOT) 8 6 mg Take 2 tablets (17 2 mg total) by mouth daily at bedtime as needed for constipation, Starting Fri 2/21/2020, Normal      sertraline (ZOLOFT) 50 mg tablet Take 1 tablet (50 mg total) by mouth daily, Starting Sun 4/28/2019, Print      sevelamer carbonate (RENVELA) 800 mg tablet TAKE 3 TABLETS BY MOUTH THREE TIMES A DAY WITH MEALS AND 2 TABLETS WITH SNACKS , Historical Med      TIMOLOL MALEATE OP Apply 1 drop to eye 2 (two) times a day, Historical Med      torsemide (DEMADEX) 100 mg tablet Take 100 mg by mouth every 12 (twelve) hours , Starting Wed 1/2/2019, Historical Med      traMADol (ULTRAM) 50 mg tablet Take 1 tablet (50 mg total) by mouth every 12 (twelve) hours as needed for moderate pain for up to 10 doses, Starting Fri 2/21/2020, Normal      !! warfarin (COUMADIN) 2 mg tablet Take a 2 mg tablet in addition to a 10 mg tablet for a total of 12 mg daily until INR is therapeutic, Normal      !! warfarin (COUMADIN) 3 mg tablet Take 9 mg by mouth daily Takes 9 mg Monday thru Fri  And 12 mg Sat and Sun , Historical Med       !! - Potential duplicate medications found  Please discuss with provider  No discharge procedures on file  PDMP Review       Value Time User    PDMP Reviewed  Yes 3/31/2020  1:43 PM Hardie Phoenix, PA-C           ED Provider  Attending physically available and evaluated Marcelino Khan I managed the patient along with the ED Attending      Electronically Signed by         Hodan Gold MD  08/15/20 1418

## 2020-08-17 ENCOUNTER — OFFICE VISIT (OUTPATIENT)
Dept: BARIATRICS | Facility: CLINIC | Age: 55
End: 2020-08-17
Payer: MEDICARE

## 2020-08-17 VITALS
WEIGHT: 276 LBS | HEART RATE: 76 BPM | TEMPERATURE: 96.2 F | SYSTOLIC BLOOD PRESSURE: 134 MMHG | BODY MASS INDEX: 44.36 KG/M2 | HEIGHT: 66 IN | DIASTOLIC BLOOD PRESSURE: 72 MMHG | RESPIRATION RATE: 20 BRPM

## 2020-08-17 DIAGNOSIS — I10 HYPERTENSION: ICD-10-CM

## 2020-08-17 DIAGNOSIS — E66.01 MORBID OBESITY (HCC): Primary | ICD-10-CM

## 2020-08-17 DIAGNOSIS — I42.9 CARDIOMYOPATHY (HCC): ICD-10-CM

## 2020-08-17 DIAGNOSIS — E11.39 TYPE 2 DIABETES MELLITUS WITH OTHER OPHTHALMIC COMPLICATION, UNSPECIFIED WHETHER LONG TERM INSULIN USE (HCC): ICD-10-CM

## 2020-08-17 DIAGNOSIS — K21.9 GASTROESOPHAGEAL REFLUX DISEASE: ICD-10-CM

## 2020-08-17 DIAGNOSIS — N18.6 ESRD ON HEMODIALYSIS (HCC): Chronic | ICD-10-CM

## 2020-08-17 DIAGNOSIS — N25.81 SECONDARY HYPERPARATHYROIDISM OF RENAL ORIGIN (HCC): Chronic | ICD-10-CM

## 2020-08-17 DIAGNOSIS — E03.9 HYPOTHYROIDISM: ICD-10-CM

## 2020-08-17 DIAGNOSIS — Z99.2 ESRD ON HEMODIALYSIS (HCC): Chronic | ICD-10-CM

## 2020-08-17 PROCEDURE — 99214 OFFICE O/P EST MOD 30 MIN: CPT | Performed by: FAMILY MEDICINE

## 2020-08-17 NOTE — PROGRESS NOTES
Assessment/Plan:    No problem-specific Assessment & Plan notes found for this encounter  Diagnoses and all orders for this visit:    Morbid obesity ([place] [address] )    Gastroesophageal reflux disease    Secondary hyperparathyroidism of renal origin ([place] [address] )    Type 2 diabetes mellitus with other ophthalmic complication, unspecified whether long term insulin use ([place] [address] )    Hypothyroidism    Hypertension    Cardiomyopathy ([place] [address] )    ESRD on hemodialysis ([place] [address] )          -Patient is pursuing Conservative Program  -Initial weight loss goal of 5-10% weight loss for improved health  -Screening labs  A1c 8 6 (2/20/20)  Not making any healthy lifestyle changes  No structure and not compliant with DM management or wt loss management  - discussed in detail her options again  - very limited with pharmacology options due to ESRD, cardiomyophathy  - she will watch the online info silvano and consider surgery  Initial: 272lbs  Current: 276lbs  Change: +4lbs  Goal:    Goals:  Food log (ie ) www myfitnesspal com,sparkpeople  com,loseit com,calorieking  com,etc  baritastic  No sugary beverages  At least 64oz of water daily  Increase physical activity by 10 minutes daily  Gradually increase physical activity to a goal of 5 days per week for 30 minutes of MODERATE intensity PLUS 2 days per week of FULL BODY resistance training  3354-8470 calories per day      Follow up in approximately PRN  Subjective:   Chief Complaint   Patient presents with    Follow-up     Patient is MWM 4 month follow-up visit  Patient ID: Farrukh Banks  is a 50 y o  female with excess weight/obesity here to pursue weight management  Patient is pursuing Conservative Program      HPI     Follow up    Pt admit not following recommended diet  She eat a twinky at bedtime almost every day to avoid low BS overnight  She doesn't check her BS regularly, states maybe 2 times a week  Her regimen is suppose to be Lantus 30U qhs and novolog sliding scale   She uses novolog 4units with dinner consistently  No exercise  PCP manages her DM2  Last a1c 8 6      The following portions of the patient's history were reviewed and updated as appropriate: allergies, current medications, past family history, past medical history, past social history, past surgical history and problem list     Review of Systems   Constitutional: Negative for activity change and appetite change  Respiratory: Negative  Cardiovascular: Negative  Gastrointestinal: Negative  Genitourinary: Negative  Musculoskeletal: Negative for arthralgias  Skin: Negative for rash  Psychiatric/Behavioral: Negative  Objective:    /72   Pulse 76   Temp (!) 96 2 °F (35 7 °C)   Resp 20   Ht 5' 6" (1 676 m)   Wt 125 kg (276 lb)   LMP 02/12/2016 (Exact Date)   BMI 44 55 kg/m²      Physical Exam   Constitutional   General appearance: Abnormal   well developed and morbidly obese  Eyes No conjunctival pallor  Ears, Nose, Mouth, and Throat Oral mucosa moist    Pulmonary   Respiratory effort: No increased work of breathing or signs of respiratory distress  Auscultation of lungs: Clear to auscultation, equal breath sounds bilaterally, no wheezes, no rales, no rhonci  Cardiovascular   Auscultation of heart: Normal rate and rhythm, normal S1 and S2, without murmurs  Examination of extremities for edema and/or varicosities: Normal   no edema  Abdomen   Abdomen: Abnormal   The abdomen was obese  Bowel sounds were normal  The abdomen was soft and nontender     Musculoskeletal   Gait and station: Normal     Psychiatric   Orientation to person, place and time: Normal     Affect: appropriate

## 2020-08-21 ENCOUNTER — TELEPHONE (OUTPATIENT)
Dept: HEMATOLOGY ONCOLOGY | Facility: CLINIC | Age: 55
End: 2020-08-21

## 2020-08-21 NOTE — TELEPHONE ENCOUNTER
Per Jabari Sparks, pt should have bx prior to being referred to medial oncology  I relayed this info to Franca Harris  She will notify Dr Bella Sands and the patient

## 2020-08-21 NOTE — TELEPHONE ENCOUNTER
Dr Dana Michele referring re: abn spinal CT done in ER  Pt is scheduled to see spinal surgeon on 9/3  Awaiting direction for Jonathan as to whether to schedule w/ medical oncology at this time or to await outcome of surgical consult  Call Adri Vargas  or , Akanksha Calle, at Dr Dana Michele off to advise

## 2020-08-25 ENCOUNTER — APPOINTMENT (EMERGENCY)
Dept: RADIOLOGY | Facility: HOSPITAL | Age: 55
End: 2020-08-25
Payer: MEDICARE

## 2020-08-25 ENCOUNTER — TRANSCRIBE ORDERS (OUTPATIENT)
Dept: LAB | Facility: CLINIC | Age: 55
End: 2020-08-25

## 2020-08-25 ENCOUNTER — APPOINTMENT (OUTPATIENT)
Dept: LAB | Facility: CLINIC | Age: 55
End: 2020-08-25
Payer: MEDICARE

## 2020-08-25 ENCOUNTER — HOSPITAL ENCOUNTER (EMERGENCY)
Facility: HOSPITAL | Age: 55
Discharge: HOME | End: 2020-08-25
Attending: EMERGENCY MEDICINE | Admitting: EMERGENCY MEDICINE
Payer: MEDICARE

## 2020-08-25 VITALS
TEMPERATURE: 99 F | HEART RATE: 75 BPM | HEIGHT: 66 IN | WEIGHT: 272.05 LBS | BODY MASS INDEX: 43.72 KG/M2 | SYSTOLIC BLOOD PRESSURE: 131 MMHG | RESPIRATION RATE: 18 BRPM | OXYGEN SATURATION: 93 % | DIASTOLIC BLOOD PRESSURE: 56 MMHG

## 2020-08-25 DIAGNOSIS — L03.213 PRESEPTAL CELLULITIS OF LEFT EYE: ICD-10-CM

## 2020-08-25 DIAGNOSIS — M25.561 RIGHT KNEE PAIN: Primary | ICD-10-CM

## 2020-08-25 DIAGNOSIS — L02.91 ABSCESS: ICD-10-CM

## 2020-08-25 DIAGNOSIS — Z79.01 LONG TERM (CURRENT) USE OF ANTICOAGULANTS: Primary | ICD-10-CM

## 2020-08-25 DIAGNOSIS — Z79.01 LONG TERM (CURRENT) USE OF ANTICOAGULANTS: ICD-10-CM

## 2020-08-25 LAB
ANION GAP SERPL CALCULATED.3IONS-SCNC: 7 MMOL/L (ref 4–13)
BUN SERPL-MCNC: 34 MG/DL (ref 5–25)
CALCIUM SERPL-MCNC: 8.1 MG/DL (ref 8.3–10.1)
CHLORIDE SERPL-SCNC: 102 MMOL/L (ref 100–108)
CO2 SERPL-SCNC: 29 MMOL/L (ref 21–32)
CREAT SERPL-MCNC: 6.15 MG/DL (ref 0.6–1.3)
CRP SERPL QL: 27 MG/L
ERYTHROCYTE [SEDIMENTATION RATE] IN BLOOD: 32 MM/HOUR (ref 0–29)
GFR SERPL CREATININE-BSD FRML MDRD: 7 ML/MIN/1.73SQ M
GLUCOSE SERPL-MCNC: 156 MG/DL (ref 65–140)
INR PPP: 2.12 (ref 0.84–1.19)
POTASSIUM SERPL-SCNC: 4.5 MMOL/L (ref 3.5–5.3)
PROTHROMBIN TIME: 23.7 SECONDS (ref 11.6–14.5)
SODIUM SERPL-SCNC: 138 MMOL/L (ref 136–145)

## 2020-08-25 PROCEDURE — 73560 X-RAY EXAM OF KNEE 1 OR 2: CPT

## 2020-08-25 PROCEDURE — 96361 HYDRATE IV INFUSION ADD-ON: CPT

## 2020-08-25 PROCEDURE — 85610 PROTHROMBIN TIME: CPT

## 2020-08-25 PROCEDURE — 99284 EMERGENCY DEPT VISIT MOD MDM: CPT

## 2020-08-25 PROCEDURE — 86140 C-REACTIVE PROTEIN: CPT | Performed by: EMERGENCY MEDICINE

## 2020-08-25 PROCEDURE — 80048 BASIC METABOLIC PNL TOTAL CA: CPT | Performed by: EMERGENCY MEDICINE

## 2020-08-25 PROCEDURE — 99285 EMERGENCY DEPT VISIT HI MDM: CPT | Performed by: EMERGENCY MEDICINE

## 2020-08-25 PROCEDURE — 96374 THER/PROPH/DIAG INJ IV PUSH: CPT

## 2020-08-25 PROCEDURE — NC001 PR NO CHARGE: Performed by: ORTHOPAEDIC SURGERY

## 2020-08-25 PROCEDURE — 36415 COLL VENOUS BLD VENIPUNCTURE: CPT

## 2020-08-25 PROCEDURE — 96375 TX/PRO/DX INJ NEW DRUG ADDON: CPT

## 2020-08-25 PROCEDURE — G1004 CDSM NDSC: HCPCS

## 2020-08-25 PROCEDURE — 70481 CT ORBIT/EAR/FOSSA W/DYE: CPT

## 2020-08-25 PROCEDURE — 85652 RBC SED RATE AUTOMATED: CPT | Performed by: EMERGENCY MEDICINE

## 2020-08-25 RX ORDER — TETRACAINE HYDROCHLORIDE 5 MG/ML
2 SOLUTION OPHTHALMIC ONCE
Status: COMPLETED | OUTPATIENT
Start: 2020-08-25 | End: 2020-08-25

## 2020-08-25 RX ORDER — LIDOCAINE HYDROCHLORIDE AND EPINEPHRINE 10; 10 MG/ML; UG/ML
5 INJECTION, SOLUTION INFILTRATION; PERINEURAL ONCE
Status: DISCONTINUED | OUTPATIENT
Start: 2020-08-25 | End: 2020-08-25

## 2020-08-25 RX ORDER — TOBRAMYCIN AND DEXAMETHASONE 3; 1 MG/ML; MG/ML
1 SUSPENSION/ DROPS OPHTHALMIC
Qty: 5 ML | Refills: 0 | Status: SHIPPED | OUTPATIENT
Start: 2020-08-25 | End: 2021-06-11 | Stop reason: HOSPADM

## 2020-08-25 RX ORDER — SULFAMETHOXAZOLE AND TRIMETHOPRIM 800; 160 MG/1; MG/1
1 TABLET ORAL 2 TIMES DAILY
Qty: 14 TABLET | Refills: 0 | Status: SHIPPED | OUTPATIENT
Start: 2020-08-25 | End: 2020-09-01

## 2020-08-25 RX ORDER — LIDOCAINE HYDROCHLORIDE AND EPINEPHRINE 10; 10 MG/ML; UG/ML
1 INJECTION, SOLUTION INFILTRATION; PERINEURAL ONCE
Status: DISCONTINUED | OUTPATIENT
Start: 2020-08-25 | End: 2020-08-25

## 2020-08-25 RX ORDER — LIDOCAINE HYDROCHLORIDE AND EPINEPHRINE 10; 10 MG/ML; UG/ML
10 INJECTION, SOLUTION INFILTRATION; PERINEURAL ONCE
Status: COMPLETED | OUTPATIENT
Start: 2020-08-25 | End: 2020-08-25

## 2020-08-25 RX ORDER — DIPHENHYDRAMINE HYDROCHLORIDE 50 MG/ML
25 INJECTION INTRAMUSCULAR; INTRAVENOUS ONCE
Status: COMPLETED | OUTPATIENT
Start: 2020-08-25 | End: 2020-08-25

## 2020-08-25 RX ORDER — CEFPODOXIME PROXETIL 200 MG/1
200 TABLET, FILM COATED ORAL 2 TIMES DAILY
Qty: 14 TABLET | Refills: 0 | Status: SHIPPED | OUTPATIENT
Start: 2020-08-25 | End: 2020-09-01

## 2020-08-25 RX ORDER — FENTANYL CITRATE 50 UG/ML
25 INJECTION, SOLUTION INTRAMUSCULAR; INTRAVENOUS ONCE
Status: COMPLETED | OUTPATIENT
Start: 2020-08-25 | End: 2020-08-25

## 2020-08-25 RX ORDER — OXYCODONE HYDROCHLORIDE AND ACETAMINOPHEN 5; 325 MG/1; MG/1
1 TABLET ORAL EVERY 4 HOURS PRN
Qty: 5 TABLET | Refills: 0 | Status: SHIPPED | OUTPATIENT
Start: 2020-08-25 | End: 2020-11-25

## 2020-08-25 RX ADMIN — SODIUM CHLORIDE 1000 ML: 0.9 INJECTION, SOLUTION INTRAVENOUS at 18:11

## 2020-08-25 RX ADMIN — LIDOCAINE HYDROCHLORIDE AND EPINEPHRINE 10 ML: 10; 10 INJECTION, SOLUTION INFILTRATION; PERINEURAL at 18:14

## 2020-08-25 RX ADMIN — FENTANYL CITRATE 25 MCG: 50 INJECTION INTRAMUSCULAR; INTRAVENOUS at 18:15

## 2020-08-25 RX ADMIN — TETRACAINE HYDROCHLORIDE 2 DROP: 5 SOLUTION OPHTHALMIC at 18:15

## 2020-08-25 RX ADMIN — IODIXANOL 85 ML: 320 INJECTION, SOLUTION INTRAVASCULAR at 19:50

## 2020-08-25 RX ADMIN — FLUORESCEIN SODIUM 1 STRIP: 1 STRIP OPHTHALMIC at 18:20

## 2020-08-25 RX ADMIN — DIPHENHYDRAMINE HYDROCHLORIDE 25 MG: 50 INJECTION, SOLUTION INTRAMUSCULAR; INTRAVENOUS at 19:09

## 2020-08-25 NOTE — ED PROVIDER NOTES
History  Chief Complaint   Patient presents with    Medical Problem     Pt reports left eye swelling, right knee pain, and "a boil under my left armpit " Patient reports problems are chronic      HPI  Patient is 55M presenting with left eye swelling and cyst under her left armpit and right knee pain  Hx of Diabetes, end stage renal disease on dialysis, DVT and on warfarin  Patient states that the left eye swelling started yesterday with mild discomfort and upon waking up this morning noted enlargement of the swelling along with significant pain  She states that the pain occurs when she moves her eyelid but not when she moves her eye  Blurry vision that occurs occasionally but no visual field loss  The left armpit cyst started about a week ago as a small induration that grew to about 4x4cm in size with induration and fluctuance underneath  It appears red and is hot to touch  There is pain when pressing on it  She had a previous abscess in that area that required I&D  Right knee pain she states that has been a chronic pain that worsened in the past couple of days that is worse when walking and standing  Denies swelling  Patient denies fevers chills, n/v, chest pain, difficulty breathing,abdominal pain, numbness weakness  Prior to Admission Medications   Prescriptions Last Dose Informant Patient Reported? Taking? ALPRAZolam (XANAX) 0 25 mg tablet  Self Yes No   Sig: Take 0 25 mg by mouth 2 (two) times a day as needed for anxiety   B Complex-C-Folic Acid (TRIPHROCAPS) 1 MG CAPS  Self No No   Sig: Take 1 capsule (1 mg total) by mouth daily   Patient not taking: Reported on 6/17/2020   CALCIUM CARBONATE PO  Self Yes No   Sig: Take 1,000 mg by mouth daily     KIONEX 15 GM/60ML suspension  Self Yes No   Sig: Take by mouth Takes as a shake on dialysis days Tues-Thurs_Sat   LIDOCAINE EX  Self Yes No   Sig: Apply topically Apply to access 1 hour prior to HD   LORazepam (ATIVAN) 1 mg tablet   No No   Sig: Take 1 tablet (1 mg total) by mouth once in imaging for anxiety for up to 1 dose   Melatonin 3 MG CAPS  Self Yes No   Sig: Take 10 mg by mouth daily at bedtime     NOVOLOG FLEXPEN 100 units/mL SOPN   Yes No   Sig: INJECT 1 TO 5 UNITS SUBCUTANEOUSLY THREE TIMES A DAY BEFORE MELAS AS PER SLIDING SCALE   Podiatric Products (FLEXITOL HEEL BALM) OINT   No No   Sig: Apply topically once for 1 dose   TIMOLOL MALEATE OP  Self Yes No   Sig: Apply 1 drop to eye 2 (two) times a day   acetaminophen (TYLENOL) 325 mg tablet   No No   Sig: Take 2 tablets (650 mg total) by mouth every 6 (six) hours as needed for mild pain or fever   albuterol (ProAir HFA) 90 mcg/act inhaler   No No   Sig: Inhale 2 puffs every 6 (six) hours as needed for wheezing or shortness of breath   atorvastatin (LIPITOR) 20 mg tablet  Self Yes No   Sig: Take 20 mg by mouth every evening    atropine (ISOPTO ATROPINE) 1 % ophthalmic solution   Yes No   Sig: Administer 1 drop to both eyes daily at bedtime    brimonidine (ALPHAGAN P) 0 15 % ophthalmic solution  Self Yes No   Sig: Administer 1 drop to both eyes 2 (two) times a day    carvedilol (COREG) 25 mg tablet  Self Yes No   Sig: Take 25 mg by mouth 2 (two) times a day with meals     cinacalcet (SENSIPAR) 30 mg tablet  Self Yes No   Sig: Take 30 mg by mouth daily   diclofenac sodium (VOLTAREN) 1 %   No No   Sig: Apply 2 g topically 4 (four) times a day   diphenhydrAMINE (BENADRYL) 25 mg capsule  Self Yes No   Sig: Take by mouth as needed for itching     dorzolamide-timolol (COSOPT) 22 3-6 8 MG/ML ophthalmic solution  Self Yes No   Sig: instill 1 drop into both eyes twice a day   gabapentin (NEURONTIN) 100 mg capsule   No No   Sig: Take 3 capsules (300 mg total) by mouth daily at bedtime   insulin glargine (LANTUS) 100 units/mL subcutaneous injection  Self No No   Sig: Inject 30 Units under the skin daily at bedtime   latanoprost (XALATAN) 0 005 % ophthalmic solution  Self Yes No   Sig: Administer 1 drop to both eyes daily at bedtime   levothyroxine 50 mcg tablet  Self Yes No   Sig: Take 50 mcg by mouth daily   loratadine (CLARITIN) 10 mg tablet   Yes No   Sig: Take 10 mg by mouth daily   losartan (COZAAR) 25 mg tablet  Self No No   Sig: Take 1 tablet (25 mg total) by mouth daily Take on NON-Dialysis Days   methazolamide (NEPTAZANE) 25 MG tablet  Self Yes No   Sig: Take 25 mg by mouth 3 (three) times a day     nystatin (MYCOSTATIN) powder   No No   Sig: Apply topically 2 (two) times a day   Patient not taking: Reported on 8/17/2020   ondansetron (ZOFRAN) 4 mg tablet  Self No No   Sig: Take 1 tablet (4 mg total) by mouth every 8 (eight) hours as needed for nausea or vomiting   pantoprazole (PROTONIX) 40 mg tablet   No No   Sig: Take 1 tablet (40 mg total) by mouth daily in the early morning   prednisoLONE acetate (PRED FORTE) 1 % ophthalmic suspension  Self Yes No   Sig: Administer 1 drop to both eyes 4 (four) times a day Patient takes only occasionally   senna (SENOKOT) 8 6 mg   No No   Sig: Take 2 tablets (17 2 mg total) by mouth daily at bedtime as needed for constipation   Patient not taking: Reported on 8/17/2020   sertraline (ZOLOFT) 50 mg tablet  Self No No   Sig: Take 1 tablet (50 mg total) by mouth daily   sevelamer carbonate (RENVELA) 800 mg tablet   Yes No   Sig: TAKE 3 TABLETS BY MOUTH THREE TIMES A DAY WITH MEALS AND 2 TABLETS WITH SNACKS    torsemide (DEMADEX) 100 mg tablet  Self Yes No   Sig: Take 100 mg by mouth every 12 (twelve) hours    traMADol (ULTRAM) 50 mg tablet   No No   Sig: Take 1 tablet (50 mg total) by mouth every 12 (twelve) hours as needed for moderate pain for up to 10 doses   Patient not taking: Reported on 6/17/2020   warfarin (COUMADIN) 2 mg tablet   No No   Sig: Take a 2 mg tablet in addition to a 10 mg tablet for a total of 12 mg daily until INR is therapeutic   warfarin (COUMADIN) 3 mg tablet   Yes No   Sig: Take 9 mg by mouth daily Takes 9 mg Monday thru Fri   And 12 mg Sat and Sun  Facility-Administered Medications: None       Past Medical History:   Diagnosis Date    Abnormal uterine bleeding (AUB)     Anxiety     Arthritis     Chronic kidney disease     Claustrophobia     Diabetes mellitus ([facility]     Disease of thyroid gland     DVT (deep venous thrombosis) (HCC)     End stage kidney disease (HCC)     Foot ulcer due to secondary DM ([facility] )     [facility] patient [facility]     Tues, Thurs, Sat    Hypertension     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[facility] )     right eye    Morbid obesity (HCC)     Osteomyelitis of fifth toe of right foot (HCC)     Panic attacks     Pulmonary embolism (HCC)     Reflux esophagitis     Thrombophlebitis of saphenous vein     Warfarin anticoagulation        Past Surgical History:   Procedure Laterality Date    AMPUTATION      r 4th toe    ARTERIOVENOUS GRAFT PLACEMENT      CATARACT EXTRACTION Bilateral     CERVICAL BIOPSY  W/ LOOP ELECTRODE EXCISION       SECTION      DIALYSIS FISTULA CREATION      DILATION AND CURETTAGE OF UTERUS      ENDOMETRIAL ABLATION W/ NOVASURE      EYE SURGERY      HYSTERECTOMY      @ age 57 (complete)    IR AV FISTULAGRAM/GRAFTOGRAM  10/11/2019    IR AV FISTULAGRAM/GRAFTOGRAM  2020    OOPHORECTOMY Bilateral     @ age 57    PERICARDIUM SURGERY      WA AMPUTATION TOE,MT-P JT Right 2020    Procedure: AMPUTATION TOE- 5th;  Surgeon: Toya Lockhart DPM;  Location: [facility];  Service: Podiatry    WA COLONOSCOPY FLX DX W/COLLJ SPEC WHEN PFRMD N/A 3/13/2019    Procedure: COLONOSCOPY;  Surgeon: Kassidy Holt MD;  [facility]: [facility]; Service: Gastroenterology    WA ESOPHAGOGASTRODUODENOSCOPY TRANSORAL DIAGNOSTIC N/A 3/13/2019    Procedure: ESOPHAGOGASTRODUODENOSCOPY (EGD); Surgeon: Kassidy Holt MD;  [facility];   Service: Gastroenterology    WA LAPAROSCOPY W TOT HYSTERECT UTERUS 250 GRAM OR LESS N/A 2016    Procedure: HYSTERECTOMY LAPAROSCOPIC TOTAL [facility]), with bilateral salpingectomy;  Surgeon: Blayne Davila DO;  [facility];  Service: Gynecology    THROMBECTOMY / ARTERIOVENOUS GRAFT REVISION      TOE SURGERY Right     removal of the 4th toe       Family History   Problem Relation Age of Onset    Heart disease Family     Diabetes Family     Heart disease Mother     Cancer Brother         neck    Throat cancer Brother     Ovarian cancer Maternal Aunt 36     I have reviewed and agree with the history as documented  E-Cigarette/Vaping    E-Cigarette Use Never User      E-Cigarette/Vaping Substances    Nicotine No     THC No     CBD No     Flavoring No     Other No     Unknown No      Social History     Tobacco Use    Smoking status: Never Smoker    Smokeless tobacco: Never Used   Substance Use Topics    Alcohol use: Never     Alcohol/week: 0 0 standard drinks     Frequency: Never     Drinks per session: Patient refused     Binge frequency: Patient refused    Drug use: No        Review of Systems   Constitutional: Negative for chills, diaphoresis and fever  HENT: Negative for sore throat and trouble swallowing  Eyes: Positive for pain and visual disturbance (blurry vision)  Negative for redness  Respiratory: Negative for cough and shortness of breath  Cardiovascular: Negative for chest pain and leg swelling  Gastrointestinal: Negative for abdominal distention, abdominal pain, constipation, diarrhea, nausea and vomiting  Endocrine: Negative  Genitourinary: Negative for difficulty urinating and dysuria  Musculoskeletal: Positive for arthralgias (Right knee)  Negative for neck pain and neck stiffness  Skin:        Left armpit cyst   Allergic/Immunologic: Negative  Neurological: Negative  Hematological: Negative  Psychiatric/Behavioral: Negative          Physical Exam  ED Triage Vitals [08/25/20 1531]   Temperature Pulse Respirations Blood Pressure SpO2   99 °F (37 2 °C) 83 18 127/68 97 %      Temp Source Heart Rate Source Patient Position - Orthostatic VS BP Location FiO2 (%)   Oral Monitor Sitting Right arm --      Pain Score       9             Orthostatic Vital Signs  Vitals:    08/25/20 1531 08/25/20 2001   BP: 127/68 131/56   Pulse: 83 75   Patient Position - Orthostatic VS: Sitting Lying       Physical Exam  Vitals signs and nursing note reviewed  Constitutional:       General: She is in acute distress  Appearance: Normal appearance  She is obese  HENT:      Head: Normocephalic and atraumatic  Right Ear: External ear normal       Left Ear: External ear normal       Nose: Nose normal       Mouth/Throat:      Mouth: Mucous membranes are moist       Pharynx: Oropharynx is clear  Eyes:      General:         Right eye: No foreign body or discharge  Left eye: No foreign body or discharge  Intraocular pressure: Left eye pressure is 13 mmHg  Measurements were taken using an automated tonometer  Extraocular Movements: Extraocular movements intact  Right eye: Normal extraocular motion and no nystagmus  Left eye: Normal extraocular motion and no nystagmus  Conjunctiva/sclera: Conjunctivae normal      Neck:      Musculoskeletal: Normal range of motion and neck supple  Cardiovascular:      Rate and Rhythm: Normal rate and regular rhythm  Pulses: Normal pulses  Heart sounds: Normal heart sounds  Pulmonary:      Effort: Pulmonary effort is normal       Breath sounds: Normal breath sounds  Abdominal:      General: Abdomen is flat  Bowel sounds are normal  There is no distension  Tenderness: There is no abdominal tenderness  Musculoskeletal: Normal range of motion  General: Tenderness (Right knee) present  No swelling  Skin:     General: Skin is warm and dry  Capillary Refill: Capillary refill takes less than 2 seconds  Coloration: Skin is not jaundiced  Findings: Erythema (Left axilla) and lesion (Abscess in the left axilla) present     Neurological: General: No focal deficit present  Mental Status: She is alert and oriented to person, place, and time  Mental status is at baseline  Psychiatric:         Mood and Affect: Mood normal          Behavior: Behavior normal          Thought Content: Thought content normal          Judgment: Judgment normal          ED Medications  Medications   fentanyl citrate (PF) 100 MCG/2ML 25 mcg (25 mcg Intravenous Given 8/25/20 1815)   sodium chloride 0 9 % bolus 1,000 mL (0 mL Intravenous Stopped 8/25/20 2037)   diphenhydrAMINE (BENADRYL) injection 25 mg (25 mg Intravenous Given 8/25/20 1909)   tetracaine 0 5 % ophthalmic solution 2 drop (2 drops Left Eye Given by Other 8/25/20 1815)   lidocaine-epinephrine (XYLOCAINE/EPINEPHRINE) 1 %-1:100,000 injection 10 mL (10 mL Infiltration Given by Other 8/25/20 1814)   fluorescein sodium sterile ophthalmic strip 1 strip (1 strip Left Eye Given by Other 8/25/20 1820)   iodixanol (VISIPAQUE) 320 MG/ML injection 100 mL (85 mL Intravenous Given 8/25/20 1950)       Diagnostic Studies  Results Reviewed     Procedure Component Value Units Date/Time    Sedimentation rate, automated [[id number]]  (Abnormal) Collected:  08/25/20 1815    Lab Status:  Final result Specimen:  Blood from Arm, Right Updated:  08/25/20 1854     Sed Rate 32 mm/hour     Narrative:       New method- Test performed using  automated Rheology Technology  If following a patient's inflammatory disease during treatment, a new baseline should be established      Basic metabolic panel [[id number]]  (Abnormal) Collected:  08/25/20 1815    Lab Status:  Final result Specimen:  Blood from Arm, Right Updated:  08/25/20 1850     Sodium 138 mmol/L      Potassium 4 5 mmol/L      Chloride 102 mmol/L      CO2 29 mmol/L      ANION GAP 7 mmol/L      BUN 34 mg/dL      Creatinine 6 15 mg/dL      Glucose 156 mg/dL      Calcium 8 1 mg/dL      eGFR 7 ml/min/1 73sq m     Narrative:       [place] guidelines for Chronic Kidney Disease (CKD):     Stage 1 with normal or high GFR (GFR > 90 mL/min/1 73 square meters)    Stage 2 Mild CKD (GFR = 60-89 mL/min/1 73 square meters)    Stage 3A Moderate CKD (GFR = 45-59 mL/min/1 73 square meters)    Stage 3B Moderate CKD (GFR = 30-44 mL/min/1 73 square meters)    Stage 4 Severe CKD (GFR = 15-29 mL/min/1 73 square meters)    Stage 5 End Stage CKD (GFR <15 mL/min/1 73 square meters)  Note: GFR calculation is accurate only with a steady state creatinine    C-reactive protein [[id number]]  (Abnormal) Collected:  08/25/20 1815    Lab Status:  Final result Specimen:  Blood from Arm, Right Updated:  08/25/20 1850     CRP 27 0 mg/L                  XR knee 1 or 2 vw left   Final Result by Romi Hemphill MD (08/26 8002)      No acute fracture or dislocation  Workstation performed: [id number]         XR knee 1 or 2 vw right   Final Result by Amira Martin MD (08/25 2141)      Osteoarthritis  Small joint effusion  No acute bony abnormality in the right knee  Workstation performed: [id number]         CT orbits/temporal bones/skull base w contrast   Final Result by Darin Riley MD (08/25 2013)      Mild left preseptal and periorbital cellulitis  No retro-orbital inflammation or abscess  Workstation performed: [id number]               Procedures  Procedures      ED Course                                           MDM  Number of Diagnoses or Management Options  Diagnosis management comments:      Patient is 55F presenting with multiple complaints including left eye swelling, left armpit cyst, and right knee pain  Following differential considered  - Left eye swelling/pain: Temporal arteritis, Orbital cellulitis, Glaucoma  - Armpit cyst: Abscess  - Right knee pain: Bursitis  Following workup ordered  - ESR, CRP, BMP, CT orbit w/ contrast  Fluorescein strip used and noted small amount of ulceration in the left cornea     Patient given fentanyl for pain with significant relief  CT orbit showed preseptal orbital cellulitis  Ortho saw patient for the right knee pain and deemed no acute problem  Patient was discharged with 5 tablets of percocet, vantin and bactrim to treat her cellulitis, and tobramycin to treat her eye  Left axillary abscess was drained  Patient was told to follow up with ophthalmology, general surgery, and ortho along with return precaution  Disposition  Final diagnoses:   Right knee pain   Abscess   Preseptal cellulitis of left eye     Time reflects when diagnosis was documented in both MDM as applicable and the Disposition within this note     Time User Action Codes Description Comment    8/25/2020  8:27 PM Reece, [address] Right knee pain     8/25/2020  8:27 PM Tatiana Bautista [[id number]] Abscess     8/25/2020  8:27 PM Terri Benitez [[id number]] Preseptal cellulitis     8/25/2020  8:28 PM Sourav Vee [[id number]] Preseptal cellulitis     8/25/2020  8:28 PM Tatiana Bautista [[id number]] Preseptal cellulitis of left eye       ED Disposition     ED Disposition Condition Date/Time Comment    Discharge Stable Tue Aug 25, 2020  8:27 PM Nikky Mills discharge to home/self care              Follow-up Information     Follow up With Specialties Details Why Contact Info Additional Information    [facility] General Surgery [place] at [facility] Surgery Schedule an appointment as soon as possible for a visit  For Abscess on Hidradenitis Suppurative, In addition to assess whether preseptal cellulitis is improving and is not worrisome for temporal arteritis [address] [address] [address] 1108 [facility], [address] [place] [address]       [address] [place] [address] Emergency Department Emergency Medicine Go to  If symptoms worsen [address]  [phone]  ED, [address] [facility] [place], [facility] 108    Charissa Kraus MD Internal Medicine Schedule an appointment as soon as possible for a visit  As needed [facility]  Jennifer Espinoza Amisha Pichardo 476       Anant Espinoza MD Ophthalmology, Pediatric Ophthalmology Schedule an appointment as soon as possible for a visit   [address] [facility] [address]       Stella Ma MD Orthopedic Surgery Schedule an appointment as soon as possible for a visit   [address]  [phone]             Discharge Medication List as of 8/25/2020  8:51 PM      START taking these medications    Details   cefpodoxime (VANTIN) 200 mg tablet Take 1 tablet (200 mg total) by mouth 2 (two) times a day for 7 days, Starting Tue 8/25/2020, Until Tue 9/1/2020, Print      oxyCODONE-acetaminophen (PERCOCET) 5-325 mg per tablet Take 1 tablet by mouth every 4 (four) hours as needed for moderate pain for up to 5 dosesMax Daily Amount: 5 tablets, Starting Tue 8/25/2020, Print      sulfamethoxazole-trimethoprim (BACTRIM DS) 800-160 mg per tablet Take 1 tablet by mouth 2 (two) times a day for 7 days smx-tmp DS (BACTRIM) 800-160 mg tabs (1tab q12 D10), Starting Tue 8/25/2020, Until Tue 9/1/2020, Print      tobramycin-dexamethasone (TOBRADEX) ophthalmic suspension Administer 1 drop into the left eye every 4 (four) hours while awake, Starting Tue 8/25/2020, Print         CONTINUE these medications which have NOT CHANGED    Details   acetaminophen (TYLENOL) 325 mg tablet Take 2 tablets (650 mg total) by mouth every 6 (six) hours as needed for mild pain or fever, Starting Thu 2/20/2020, Print      albuterol (ProAir HFA) 90 mcg/act inhaler Inhale 2 puffs every 6 (six) hours as needed for wheezing or shortness of breath, Starting Fri 2/21/2020, Normal      ALPRAZolam (XANAX) 0 25 mg tablet Take 0 25 mg by mouth 2 (two) times a day as needed for anxiety, Historical Med      atorvastatin (LIPITOR) 20 mg tablet Take 20 mg by mouth every evening , Starting Tue 4/24/2018, Historical Med      atropine (ISOPTO ATROPINE) 1 % ophthalmic solution Administer 1 drop to both eyes daily at bedtime , Starting Mon 2/4/2019, Historical Med      B Complex-C-Folic Acid (TRIPHROCAPS) 1 MG CAPS Take 1 capsule (1 mg total) by mouth daily, Starting Sun 4/28/2019, Print      brimonidine (ALPHAGAN P) 0 15 % ophthalmic solution Administer 1 drop to both eyes 2 (two) times a day , Historical Med      CALCIUM CARBONATE PO Take 1,000 mg by mouth daily  , Historical Med      carvedilol (COREG) 25 mg tablet Take 25 mg by mouth 2 (two) times a day with meals  , Historical Med      cinacalcet (SENSIPAR) 30 mg tablet Take 30 mg by mouth daily, Historical Med      diclofenac sodium (VOLTAREN) 1 % Apply 2 g topically 4 (four) times a day, Starting Sun 5/19/2019, Print      diphenhydrAMINE (BENADRYL) 25 mg capsule Take by mouth as needed for itching  , Historical Med      dorzolamide-timolol (COSOPT) 22 3-6 8 MG/ML ophthalmic solution instill 1 drop into both eyes twice a day, Historical Med      gabapentin (NEURONTIN) 100 mg capsule Take 3 capsules (300 mg total) by mouth daily at bedtime, Starting Fri 2/21/2020, No Print      insulin glargine (LANTUS) 100 units/mL subcutaneous injection Inject 30 Units under the skin daily at bedtime, Starting Sat 8/31/2019, Print      KIONEX 15 GM/60ML suspension Take by mouth Takes as a shake on dialysis days Tues-Thurs_Sat, Starting Mon 12/10/2018, Historical Med      latanoprost (XALATAN) 0 005 % ophthalmic solution Administer 1 drop to both eyes daily at bedtime, Historical Med      levothyroxine 50 mcg tablet Take 50 mcg by mouth daily, Historical Med      LIDOCAINE EX Apply topically Apply to access 1 hour prior to HD, Historical Med      loratadine (CLARITIN) 10 mg tablet Take 10 mg by mouth daily, Historical Med      LORazepam (ATIVAN) 1 mg tablet Take 1 tablet (1 mg total) by mouth once in imaging for anxiety for up to 1 dose, Starting Tue 3/31/2020, Normal      losartan (COZAAR) 25 mg tablet Take 1 tablet (25 mg total) by mouth daily Take on NON-Dialysis Days, Starting Wed 5/23/2018, No Print      Melatonin 3 MG CAPS Take 10 mg by mouth daily at bedtime  , Historical Med      methazolamide (NEPTAZANE) 25 MG tablet Take 25 mg by mouth 3 (three) times a day  , Historical Med      NOVOLOG FLEXPEN 100 units/mL SOPN INJECT 1 TO 5 UNITS SUBCUTANEOUSLY THREE TIMES A DAY BEFORE MELAS AS PER SLIDING SCALE, Historical Med      nystatin (MYCOSTATIN) powder Apply topically 2 (two) times a day, Starting Sat 8/31/2019, Print      ondansetron (ZOFRAN) 4 mg tablet Take 1 tablet (4 mg total) by mouth every 8 (eight) hours as needed for nausea or vomiting, Starting Tue 1/22/2019, Normal      pantoprazole (PROTONIX) 40 mg tablet Take 1 tablet (40 mg total) by mouth daily in the early morning, Starting Fri 2/21/2020, No Print      Podiatric Products (FLEXITOL HEEL BALM) OINT Apply topically once for 1 dose, Starting Wed 3/11/2020, Print      prednisoLONE acetate (PRED FORTE) 1 % ophthalmic suspension Administer 1 drop to both eyes 4 (four) times a day Patient takes only occasionally, Historical Med      senna (SENOKOT) 8 6 mg Take 2 tablets (17 2 mg total) by mouth daily at bedtime as needed for constipation, Starting Fri 2/21/2020, Normal      sertraline (ZOLOFT) 50 mg tablet Take 1 tablet (50 mg total) by mouth daily, Starting Sun 4/28/2019, Print      sevelamer carbonate (RENVELA) 800 mg tablet TAKE 3 TABLETS BY MOUTH THREE TIMES A DAY WITH MEALS AND 2 TABLETS WITH SNACKS , Historical Med      TIMOLOL MALEATE OP Apply 1 drop to eye 2 (two) times a day, Historical Med      torsemide (DEMADEX) 100 mg tablet Take 100 mg by mouth every 12 (twelve) hours , Starting Wed 1/2/2019, Historical Med      traMADol (ULTRAM) 50 mg tablet Take 1 tablet (50 mg total) by mouth every 12 (twelve) hours as needed for moderate pain for up to 10 doses, Starting Fri 2/21/2020, Normal      !! warfarin (COUMADIN) 2 mg tablet Take a 2 mg tablet in addition to a 10 mg tablet for a total of 12 mg daily until INR is therapeutic, Normal      !! warfarin (COUMADIN) 3 mg tablet Take 9 mg by mouth daily Takes 9 mg Monday thru Fri  And 12 mg Sat and Sun , Historical Med       !! - Potential duplicate medications found  Please discuss with provider  No discharge procedures on file  PDMP Review       Value Time User    PDMP Reviewed  Yes 3/31/2020  1:43 PM Isela Richmond PA-C           ED Provider  Attending physically available and evaluated Diana Rivers  I managed the patient along with the ED Attending      Electronically Signed by         Gwen Capone MD  08/27/20 6443       Gwen Capone MD  08/27/20 5883
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
## 2020-08-26 NOTE — ED ATTENDING ATTESTATION
8/25/2020  I, Grecia Zarate MD, saw and evaluated the patient  I have discussed the patient with the resident/non-physician practitioner and agree with the resident's/non-physician practitioner's findings, Plan of Care, and MDM as documented in the resident's/non-physician practitioner's note, except where noted  All available labs and Radiology studies were reviewed  I was present for key portions of any procedure(s) performed by the resident/non-physician practitioner and I was immediately available to provide assistance  At this point I agree with the current assessment done in the Emergency Department  I have conducted an independent evaluation of this patient a history and physical is as follows:    49 y/o F with hx MMP including DM, ESRD on dialysis (anuric), presenting with 3 complaints  1) Patient c/o L periorbital swelling and erythema for 2 days  Endorses L eye pain with intermittent blurry vision  Also c/o left sided headache  No jaw claudication  No neck pain or stiffness  No fever or chills  No recent eye injury  No other neuro complaints  Does wear glasses, no contact use  Pupils 3mm, equal and reactive bilaterally  EOMI, though there is pain with superior movement  Conjunctiva normal   There is periorbital erythema and edema  Woods lamp exam with fluorescein staining shows questionable small abrasion at 12:00 position  No ulceration appreciated  L IOP is 12  Uncooperative with visual acuity exam but tracks movements normally  Mild tenderness over L upper face including over temporal artery, but not worse over temporal artery than elsewhere  Did get CT orbits given the pain with extraocular movements, no evidence of orbital cellulitis  CT is consistent with periorbital cellulitis  ESR and CRP were checked, elevated but not as elevated (ESR particularly) as I would generally expect with temporal arteritis    Given that both clinical and CT exam consistent with diagnosis of periorbital cellulitis which would explain the patient's pain, I think that temporal arteritis is much less likely  We will give surgical referral for re-evaluation and possible biopsy if still ongoing pain in spite of treatment for periorbital cellulitis  However I have decided not to initiate steroids at this time  I think that the risk of initiating them in this patient with diabetes and active infection at two sites (periorbital cellulitis and abscess/ cellulitis of axilla) is fairly high risk and could lead to worsening infection and hyperglycemia  As I think diagnosis of temporal arteritis is quite unlikely, I feel that risks of steroids in this patient outweigh the benefit  Will start on systemic antibiotics and antibiotic eye drops, and also refer to ophthalmology  2) Patient c/o abscess to L axilla  Has history of the same  No systemic symptoms  There is fluctuant area to the L axilla with surrounding erythema  Bedside US consistent with abscess  I&D performed with purulent material drained  Abscess most likely secondary to hidradenitis  Will start on antibiotics and refer to surgery  3) Patient c/o chronic R knee pain  Not worsening  Knees appear symmetric  Mild tenderness to lateral knee  No swelling, warmth, ecchymosis, deformity  ROM and distal neurovascular intact  Will get XR and refer to orthopedics      ED Course         Critical Care Time  Procedures

## 2020-08-26 NOTE — ED NOTES
Awaiting xray per ortho prior to discharge, pt requesting pain meds     Hortensia Acevedo, RN  08/25/20 5083

## 2020-08-26 NOTE — DISCHARGE INSTRUCTIONS
Discharge Instructions - Orthopedics  Navneet Real 50 y o  female MRN: [id number]  Unit/Bed#: X ray    Weight Bearing Status:                                           Weight bearing as tolerated bilateral lower extremities  Pain:  Continue analgesics as directed  Can take over the counter NSAID such as ibuprofen or naproxen to help with bilateral knee pain from osteoarthritis  Appt Instructions: If you do not have your appointment, please call the clinic at [phone] t and make appointment with Dr Sumanth Gaffney to discuss treatment options  Please take vantin twice a day   Take bactrim twice a day  Administer 1 drop of tobradex every 4 hours while awake      Contact the office sooner if you experience any increased numbness/tingling in the extremities  Follow up with general surgery for abscess and Hidradenitis Suppurative     Follow up with eye doctor  Follow up with Orthopedics  If symptoms worsen please come back to the ED

## 2020-09-01 NOTE — PATIENT INSTRUCTIONS
Foot Care for People with Diabetes   WHAT YOU NEED TO KNOW:   · Foot care helps protect your feet and prevent foot ulcers or sores  Long-term high blood sugar levels can damage the blood vessels and nerves in your legs and feet  This damage makes it hard to feel pressure, pain, temperature, and touch  You may not be able to feel a cut or sore, or shoes that are too tight  Foot care is needed to prevent serious problems, such as an infection or amputation  · Diabetes may cause your toes to become crooked or curved under  These changes may affect the way you walk and can lead to increased pressure on your foot  The pressure can decrease blood flow to your feet  Lack of blood flow increases your risk for a foot ulcer  Do not ignore small problems, such as dry skin or small wounds  These can become life-threatening over time without proper care  DISCHARGE INSTRUCTIONS:   Contact your healthcare provider if:   · Your feet become numb, weak, or hard to move  · You have pus draining from a sore on your foot  · You have a wound on your foot that gets bigger, deeper, or does not heal      · You see blisters, cuts, scratches, calluses, or sores on your foot  · You have a fever, and your feet become red, warm, and swollen  · Your toenails become thick, curled, or yellow  · You find it hard to check your feet because your vision is poor  · You have questions or concerns about your condition or care  Foot care:   · Check your feet each day  Look at your whole foot, including the bottom, and between and under your toes  Check for wounds, corns, and calluses  Use a mirror to see the bottom of your feet  The skin on your feet may be shiny, tight, or darker than normal  Your feet may also be cold and pale  Feel your feet by running your hands along the tops, bottoms, sides, and between your toes  Redness, swelling, and warmth are signs of blood flow problems that can lead to a foot ulcer   Do not try to remove corns or calluses yourself  · Wash your feet each day with soap and warm water  Do not use hot water, because this can injure your foot  Dry your feet gently with a towel after you wash them  Dry between and under your toes  · Apply lotion or a moisturizer on your dry feet  Ask your healthcare provider what lotions are best to use  Do not put lotion or moisturizer between your toes  · Cut your toenails correctly  File or cut your toenails straight across  Use a soft brush to clean around your toenails  If your toenails are very thick, you may need to have a healthcare provider or specialist cut them  · Protect your feet  Do not walk barefoot or wear your shoes without socks  Check your shoes for rocks or other objects that can hurt your feet  Wear cotton socks to help keep your feet dry  Wear socks without toe seams, or wear them with the seams inside out  Change your socks each day  Do not wear socks that are dirty or damp  · Wear shoes that fit well  Wear shoes that do not rub against any area of your feet  Your shoes should be ½ to ¾ inch (1 to 2 centimeters) longer than your feet  Your shoes should also have extra space around the widest part of your feet  Walking or athletic shoes with laces or straps that adjust are best  Ask your healthcare provider for help to choose shoes that fit you best  Ask him if you need to wear an insert, orthotic, or bandage on your feet  Follow up with your healthcare provider or foot specialist as directed: You will need to have your feet checked at least once a year  You may need a foot exam more often if you have nerve damage, foot deformities, or ulcers  Write down your questions so you remember to ask them during your visits  © 2017 [address] [employer] Information is for End User's use only and may not be sold, redistributed or otherwise used for commercial purposes   All illustrations and images included in CareNotes® are the copyrighted property of [employer]  or Warren Weinstein  The above information is an  only  It is not intended as medical advice for individual conditions or treatments  Talk to your doctor, nurse or pharmacist before following any medical regimen to see if it is safe and effective for you

## 2020-09-01 NOTE — PROGRESS NOTES
This patient was seen on 8/12/20  Assessment:    Problem List Items Addressed This Visit        Endocrine    Diabetic polyneuropathy associated with type 2 diabetes mellitus ([facility] ) - Primary    Relevant Orders    Diabetic Shoe Inserts    Diabetic Shoe       Musculoskeletal and Integument    Tinea unguium          Plan:  High risk  foot precautions reviewed with patient including the need to wear protective shoegear at all times when walking including in the home, the need to check feet all surfaces daily with a mirror and report and skin breaks to podiatry, the need to apply an emollient to skin of feet daily  Prescription given tridensity, custom molded full length insoles of plastazote and PPT x 3 pairs    Nails x 7 were debrided with double-action nail forceps of their thickness, length and width  Diagnoses and all orders for this visit:    Diabetic polyneuropathy associated with type 2 diabetes mellitus ([facility] )  -     Diabetic Shoe Inserts  -     Diabetic Shoe    Tinea unguium          Subjective:      Patient ID: Marcelino Khan is a 50 y o  female  Phineas Finer is here for high risk foot care - specifically to have her nails debrided  She is high risk due to history of diabetes and recent history of toe amputation  The following portions of the patient's history were reviewed and updated as appropriate: allergies, current medications, past family history, past medical history, past social history, past surgical history and problem list     Review of Systems   Constitutional: Negative  Neurological: Positive for numbness  She notes tingling and burning in her feet, particularly at night  Objective:      Temp 98 °F (36 7 °C) (Tympanic)   Ht 5' 6" (1 676 m) Comment: verbal  Wt 127 kg (280 lb)   LMP 02/12/2016 (Exact Date)   BMI 45 19 kg/m²          Physical Exam  Vitals signs and nursing note reviewed  Constitutional:       General: She is not in acute distress       Appearance: Normal appearance  She is well-developed  She is obese  She is not ill-appearing, toxic-appearing or diaphoretic  Cardiovascular:      Pulses:           Dorsalis pedis pulses are 1+ on the right side and 1+ on the left side  Posterior tibial pulses are 0 on the right side and 0 on the left side  Pulmonary:      Effort: No respiratory distress  Breath sounds: No wheezing  Musculoskeletal:         General: No tenderness  Comments: History of right 4th, and 5th digit amputations  Feet:      Right foot:      Protective Sensation: 10 sites tested  0 sites sensed  Left foot:      Protective Sensation: 10 sites tested  0 sites sensed  Skin:     General: Skin is warm and dry  Capillary Refill: Capillary refill takes less than 2 seconds  Findings: No erythema  Comments: Surgical incision is healed  Nails yellow-brown, 5mm thick, dystrophic, with crumbling subugunal debris x 7  Non-ulcerated callous noted plantar RIght foot  Her skin is shiny, thin and atrophic on the feet  Neurological:      Mental Status: She is alert and oriented to person, place, and time  Comments: Protective sensation diminished b/l  Psychiatric:         Behavior: Behavior normal          Thought Content:  Thought content normal          Judgment: Judgment normal

## 2020-09-28 ENCOUNTER — TELEPHONE (OUTPATIENT)
Dept: OBGYN CLINIC | Facility: HOSPITAL | Age: 55
End: 2020-09-28

## 2020-09-28 NOTE — TELEPHONE ENCOUNTER
Patient called in to see if she can get scheduled for  Bilateral Gel shots for knees  She saw Alex Foley in March but did not establish with an ortho doctor  She was scheduled to see Allison  Her insurance is Medicare and [facility] which is ok for injections  Does she need an appointment to get established with Dr Kenneth Barrett before Gel shots can be administered?

## 2020-09-28 NOTE — TELEPHONE ENCOUNTER
She has seen Dr Lanis Opitz in the past for her knee arthritis  She just needs to follow up care  She has only been seen for her right knee  She was seen in the hospital for the right knee  Appointment can be made with anyone - can even be primary care sports medicine for injections  But if you are going to schedule her with a surgeon, it should be the surgeon she has already seen for this issue (Sky)

## 2020-09-30 ENCOUNTER — OFFICE VISIT (OUTPATIENT)
Dept: OBGYN CLINIC | Facility: HOSPITAL | Age: 55
End: 2020-09-30
Payer: MEDICARE

## 2020-09-30 VITALS
SYSTOLIC BLOOD PRESSURE: 121 MMHG | HEIGHT: 66 IN | HEART RATE: 75 BPM | BODY MASS INDEX: 44.36 KG/M2 | DIASTOLIC BLOOD PRESSURE: 73 MMHG | WEIGHT: 276 LBS

## 2020-09-30 DIAGNOSIS — M17.0 OSTEOARTHRITIS OF BOTH KNEES, UNSPECIFIED OSTEOARTHRITIS TYPE: ICD-10-CM

## 2020-09-30 DIAGNOSIS — M25.561 CHRONIC PAIN OF RIGHT KNEE: ICD-10-CM

## 2020-09-30 DIAGNOSIS — M25.562 ACUTE PAIN OF LEFT KNEE: Primary | ICD-10-CM

## 2020-09-30 DIAGNOSIS — G89.29 CHRONIC PAIN OF RIGHT KNEE: ICD-10-CM

## 2020-09-30 PROCEDURE — 99213 OFFICE O/P EST LOW 20 MIN: CPT | Performed by: ORTHOPAEDIC SURGERY

## 2020-09-30 PROCEDURE — 20610 DRAIN/INJ JOINT/BURSA W/O US: CPT | Performed by: ORTHOPAEDIC SURGERY

## 2020-09-30 RX ORDER — KETOROLAC TROMETHAMINE 30 MG/ML
60 INJECTION, SOLUTION INTRAMUSCULAR; INTRAVENOUS
Status: COMPLETED | OUTPATIENT
Start: 2020-09-30 | End: 2020-09-30

## 2020-09-30 RX ORDER — METHOCARBAMOL 500 MG/1
500 TABLET, FILM COATED ORAL 3 TIMES DAILY PRN
COMMUNITY
Start: 2020-09-23 | End: 2020-11-03

## 2020-09-30 RX ORDER — BUPIVACAINE HYDROCHLORIDE 2.5 MG/ML
2 INJECTION, SOLUTION INFILTRATION; PERINEURAL
Status: COMPLETED | OUTPATIENT
Start: 2020-09-30 | End: 2020-09-30

## 2020-09-30 RX ORDER — LIDOCAINE HYDROCHLORIDE 10 MG/ML
2 INJECTION, SOLUTION INFILTRATION; PERINEURAL
Status: COMPLETED | OUTPATIENT
Start: 2020-09-30 | End: 2020-09-30

## 2020-09-30 RX ORDER — LIDOCAINE 50 MG/G
PATCH TOPICAL
COMMUNITY
Start: 2020-09-15 | End: 2021-11-21

## 2020-09-30 RX ADMIN — BUPIVACAINE HYDROCHLORIDE 2 ML: 2.5 INJECTION, SOLUTION INFILTRATION; PERINEURAL at 15:08

## 2020-09-30 RX ADMIN — BUPIVACAINE HYDROCHLORIDE 2 ML: 2.5 INJECTION, SOLUTION INFILTRATION; PERINEURAL at 15:09

## 2020-09-30 RX ADMIN — LIDOCAINE HYDROCHLORIDE 2 ML: 10 INJECTION, SOLUTION INFILTRATION; PERINEURAL at 15:09

## 2020-09-30 RX ADMIN — KETOROLAC TROMETHAMINE 60 MG: 30 INJECTION, SOLUTION INTRAMUSCULAR; INTRAVENOUS at 15:08

## 2020-09-30 RX ADMIN — KETOROLAC TROMETHAMINE 60 MG: 30 INJECTION, SOLUTION INTRAMUSCULAR; INTRAVENOUS at 15:09

## 2020-09-30 RX ADMIN — LIDOCAINE HYDROCHLORIDE 2 ML: 10 INJECTION, SOLUTION INFILTRATION; PERINEURAL at 15:08

## 2020-09-30 NOTE — PATIENT INSTRUCTIONS
Patient received Toradol 2cc injection bilateral knees    Intra-articular Hyaluronic Acid Injection  Intra-articular Hyaluronic Acid Injection Brands  There are several types of Intra-articular Hyaluronic Acid Injections which vary in brand, dosage, and frequency  There are single dose injections as well as a series of injections  Your provider will choose the injection brand and series based on clinical factors as well as what your insurance company prefers or covers  Buy and Bill vs  Specialty Pharmacy  Injections may be obtained in two ways:   Buy and Bill: The insurance is requiring the office to buy the injection medication and bill the patients insurance for both the injection medication and the office visit   Specialty Pharmacy: The insurance is requiring the office to order the medication from the insurances Specialty Pharmacy and the specialty pharmacy will bill the patients insurance directly for the injection medication  When a specialty pharmacy is used, the office cannot bill for the injection medication, the office can only bill for the office visit  (Examples of a specialty pharmacy include, but not limited to, [facility] , [address] [facility], [address] [place] [address])  Time Line Expectations  Your care team will work to ensure the injection(s) being ordered for you are authorized by your insurance and is obtained by the insurance plans preferred pharmacy  Your insurance plan may dictate the brand and dosage of the series  Due to the nature of obtaining an insurance authorization as well as working with the pharmacy, the authorization process may take up to 14 business days, sometimes longer if your insurance plan denies the injection authorization or if the pharmacy has delays  If your insurance plan denies the authorization our team will submit an appeal which may lengthen the wait time for the approval of your injection      Our injection authorization team will be in contact with you throughout the injection authorization process, however, please note there may be times of silence while we wait for insurance and pharmacy updates  Your injection appointment(s) will be scheduled once our injection authorization team has received approval from your insurance plan and/or from the pharmacy  Please note: Specialty pharmacies are often delayed when obtaining authorizations and verifying benefits for injection medications  You may receive a phone call from the specialty pharmacy to authorize the medication; so it is important to accept calls from the specialty pharmacy to ensure your injections are not delayed
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
## 2020-10-08 ENCOUNTER — TRANSCRIBE ORDERS (OUTPATIENT)
Dept: ADMINISTRATIVE | Facility: HOSPITAL | Age: 55
End: 2020-10-08

## 2020-10-08 DIAGNOSIS — M54.16 LUMBAR RADICULOPATHY: ICD-10-CM

## 2020-10-08 DIAGNOSIS — M54.12 CERVICAL RADICULOPATHY: Primary | ICD-10-CM

## 2020-10-20 ENCOUNTER — APPOINTMENT (EMERGENCY)
Dept: RADIOLOGY | Facility: HOSPITAL | Age: 55
End: 2020-10-20
Payer: MEDICARE

## 2020-10-20 ENCOUNTER — HOSPITAL ENCOUNTER (EMERGENCY)
Facility: HOSPITAL | Age: 55
Discharge: HOME | End: 2020-10-20
Attending: EMERGENCY MEDICINE
Payer: MEDICARE

## 2020-10-20 VITALS
HEIGHT: 66 IN | OXYGEN SATURATION: 99 % | DIASTOLIC BLOOD PRESSURE: 73 MMHG | RESPIRATION RATE: 18 BRPM | TEMPERATURE: 97.8 F | SYSTOLIC BLOOD PRESSURE: 172 MMHG | WEIGHT: 277 LBS | HEART RATE: 82 BPM | BODY MASS INDEX: 44.52 KG/M2

## 2020-10-20 DIAGNOSIS — M54.40 LOW BACK PAIN WITH SCIATICA: Primary | ICD-10-CM

## 2020-10-20 DIAGNOSIS — L03.019: ICD-10-CM

## 2020-10-20 DIAGNOSIS — M54.9 MUSCULOSKELETAL BACK PAIN: ICD-10-CM

## 2020-10-20 PROCEDURE — 10060 I&D ABSCESS SIMPLE/SINGLE: CPT | Performed by: EMERGENCY MEDICINE

## 2020-10-20 PROCEDURE — 99285 EMERGENCY DEPT VISIT HI MDM: CPT | Performed by: EMERGENCY MEDICINE

## 2020-10-20 PROCEDURE — 96372 THER/PROPH/DIAG INJ SC/IM: CPT

## 2020-10-20 PROCEDURE — 72072 X-RAY EXAM THORAC SPINE 3VWS: CPT

## 2020-10-20 PROCEDURE — 99283 EMERGENCY DEPT VISIT LOW MDM: CPT

## 2020-10-20 RX ORDER — OXYCODONE HYDROCHLORIDE AND ACETAMINOPHEN 5; 325 MG/1; MG/1
1 TABLET ORAL EVERY 8 HOURS PRN
Qty: 5 TABLET | Refills: 0 | Status: SHIPPED | OUTPATIENT
Start: 2020-10-20 | End: 2020-10-30

## 2020-10-20 RX ORDER — KETOROLAC TROMETHAMINE 30 MG/ML
15 INJECTION, SOLUTION INTRAMUSCULAR; INTRAVENOUS ONCE
Status: COMPLETED | OUTPATIENT
Start: 2020-10-20 | End: 2020-10-20

## 2020-10-20 RX ORDER — METHOCARBAMOL 500 MG/1
500 TABLET, FILM COATED ORAL EVERY 8 HOURS PRN
Qty: 24 TABLET | Refills: 0 | Status: SHIPPED | OUTPATIENT
Start: 2020-10-20 | End: 2021-06-11 | Stop reason: HOSPADM

## 2020-10-20 RX ORDER — METHOCARBAMOL 500 MG/1
500 TABLET, FILM COATED ORAL ONCE
Status: COMPLETED | OUTPATIENT
Start: 2020-10-20 | End: 2020-10-20

## 2020-10-20 RX ORDER — OXYCODONE HYDROCHLORIDE 5 MG/1
5 TABLET ORAL ONCE
Status: COMPLETED | OUTPATIENT
Start: 2020-10-20 | End: 2020-10-20

## 2020-10-20 RX ORDER — LIDOCAINE 40 MG/G
CREAM TOPICAL ONCE
Status: COMPLETED | OUTPATIENT
Start: 2020-10-20 | End: 2020-10-20

## 2020-10-20 RX ADMIN — LIDOCAINE: 4 CREAM TOPICAL at 05:41

## 2020-10-20 RX ADMIN — KETOROLAC TROMETHAMINE 15 MG: 30 INJECTION, SOLUTION INTRAMUSCULAR at 05:45

## 2020-10-20 RX ADMIN — METHOCARBAMOL TABLETS 500 MG: 500 TABLET, COATED ORAL at 05:38

## 2020-10-20 RX ADMIN — OXYCODONE HYDROCHLORIDE 5 MG: 5 TABLET ORAL at 06:29

## 2020-10-21 ENCOUNTER — TELEPHONE (OUTPATIENT)
Dept: PHYSICAL THERAPY | Facility: OTHER | Age: 55
End: 2020-10-21

## 2020-10-27 ENCOUNTER — LAB (OUTPATIENT)
Dept: LAB | Facility: CLINIC | Age: 55
End: 2020-10-27
Payer: MEDICARE

## 2020-10-28 ENCOUNTER — TELEPHONE (OUTPATIENT)
Dept: PHYSICAL THERAPY | Facility: OTHER | Age: 55
End: 2020-10-28

## 2020-11-02 ENCOUNTER — PREP FOR PROCEDURE (OUTPATIENT)
Dept: INTERVENTIONAL RADIOLOGY/VASCULAR | Facility: CLINIC | Age: 55
End: 2020-11-02

## 2020-11-02 ENCOUNTER — TRANSCRIBE ORDERS (OUTPATIENT)
Dept: RADIOLOGY | Facility: HOSPITAL | Age: 55
End: 2020-11-02

## 2020-11-02 ENCOUNTER — TELEPHONE (OUTPATIENT)
Dept: INTERVENTIONAL RADIOLOGY/VASCULAR | Facility: HOSPITAL | Age: 55
End: 2020-11-02

## 2020-11-02 DIAGNOSIS — N18.6 ESRD (END STAGE RENAL DISEASE) (HCC): Primary | ICD-10-CM

## 2020-11-02 DIAGNOSIS — Z99.2 ESRD ON HEMODIALYSIS (HCC): Primary | Chronic | ICD-10-CM

## 2020-11-02 DIAGNOSIS — N18.6 ESRD ON HEMODIALYSIS (HCC): Primary | Chronic | ICD-10-CM

## 2020-11-02 RX ORDER — METHYLPREDNISOLONE 16 MG/1
32 TABLET ORAL
Qty: 4 TABLET | Refills: 0 | Status: SHIPPED | OUTPATIENT
Start: 2020-11-02 | End: 2020-11-02 | Stop reason: SDUPTHER

## 2020-11-03 ENCOUNTER — OFFICE VISIT (OUTPATIENT)
Dept: OBGYN CLINIC | Facility: HOSPITAL | Age: 55
End: 2020-11-03
Payer: MEDICARE

## 2020-11-03 VITALS
SYSTOLIC BLOOD PRESSURE: 110 MMHG | HEART RATE: 87 BPM | HEIGHT: 66 IN | BODY MASS INDEX: 44.71 KG/M2 | DIASTOLIC BLOOD PRESSURE: 67 MMHG

## 2020-11-03 DIAGNOSIS — M25.562 CHRONIC PAIN OF LEFT KNEE: ICD-10-CM

## 2020-11-03 DIAGNOSIS — M17.12 PRIMARY OSTEOARTHRITIS OF LEFT KNEE: ICD-10-CM

## 2020-11-03 DIAGNOSIS — G89.29 CHRONIC PAIN OF LEFT KNEE: ICD-10-CM

## 2020-11-03 DIAGNOSIS — M17.11 PRIMARY OSTEOARTHRITIS OF RIGHT KNEE: Primary | ICD-10-CM

## 2020-11-03 DIAGNOSIS — G89.29 CHRONIC PAIN OF RIGHT KNEE: ICD-10-CM

## 2020-11-03 DIAGNOSIS — M25.561 CHRONIC PAIN OF RIGHT KNEE: ICD-10-CM

## 2020-11-03 PROCEDURE — 20610 DRAIN/INJ JOINT/BURSA W/O US: CPT | Performed by: ORTHOPAEDIC SURGERY

## 2020-11-03 RX ORDER — CARVEDILOL 12.5 MG/1
TABLET ORAL
COMMUNITY
Start: 2020-10-16 | End: 2021-06-11 | Stop reason: HOSPADM

## 2020-11-03 RX ORDER — HYALURONATE SODIUM 10 MG/ML
20 SYRINGE (ML) INTRAARTICULAR
Status: COMPLETED | OUTPATIENT
Start: 2020-11-03 | End: 2020-11-03

## 2020-11-03 RX ORDER — ALPRAZOLAM 0.5 MG/1
TABLET ORAL
COMMUNITY
Start: 2020-09-28 | End: 2021-06-11 | Stop reason: HOSPADM

## 2020-11-03 RX ORDER — METHYLPREDNISOLONE 32 MG/1
TABLET ORAL
COMMUNITY
Start: 2020-10-31 | End: 2020-11-25

## 2020-11-03 RX ORDER — DIPHENHYDRAMINE HCL 50 MG/1
CAPSULE ORAL
COMMUNITY
Start: 2020-10-31 | End: 2021-06-11 | Stop reason: HOSPADM

## 2020-11-03 RX ORDER — METHYLPREDNISOLONE 16 MG/1
TABLET ORAL
COMMUNITY
Start: 2020-11-02 | End: 2020-11-25

## 2020-11-03 RX ADMIN — Medication 20 MG: at 14:40

## 2020-11-10 ENCOUNTER — OFFICE VISIT (OUTPATIENT)
Dept: OBGYN CLINIC | Facility: HOSPITAL | Age: 55
End: 2020-11-10
Payer: MEDICARE

## 2020-11-10 VITALS
HEART RATE: 92 BPM | HEIGHT: 66 IN | DIASTOLIC BLOOD PRESSURE: 73 MMHG | BODY MASS INDEX: 45.16 KG/M2 | SYSTOLIC BLOOD PRESSURE: 127 MMHG | WEIGHT: 281 LBS

## 2020-11-10 DIAGNOSIS — M25.562 CHRONIC PAIN OF BOTH KNEES: ICD-10-CM

## 2020-11-10 DIAGNOSIS — M17.0 PRIMARY OSTEOARTHRITIS OF BOTH KNEES: Primary | ICD-10-CM

## 2020-11-10 DIAGNOSIS — M25.561 CHRONIC PAIN OF BOTH KNEES: ICD-10-CM

## 2020-11-10 DIAGNOSIS — G89.29 CHRONIC PAIN OF BOTH KNEES: ICD-10-CM

## 2020-11-10 PROCEDURE — 20610 DRAIN/INJ JOINT/BURSA W/O US: CPT | Performed by: ORTHOPAEDIC SURGERY

## 2020-11-10 RX ORDER — HYALURONATE SODIUM 10 MG/ML
20 SYRINGE (ML) INTRAARTICULAR
Status: COMPLETED | OUTPATIENT
Start: 2020-11-10 | End: 2020-11-10

## 2020-11-10 RX ADMIN — Medication 20 MG: at 14:17

## 2020-11-17 ENCOUNTER — OFFICE VISIT (OUTPATIENT)
Dept: OBGYN CLINIC | Facility: HOSPITAL | Age: 55
End: 2020-11-17
Payer: MEDICARE

## 2020-11-17 VITALS
HEART RATE: 82 BPM | BODY MASS INDEX: 45.35 KG/M2 | HEIGHT: 66 IN | SYSTOLIC BLOOD PRESSURE: 99 MMHG | DIASTOLIC BLOOD PRESSURE: 56 MMHG

## 2020-11-17 DIAGNOSIS — M25.561 CHRONIC PAIN OF BOTH KNEES: ICD-10-CM

## 2020-11-17 DIAGNOSIS — M17.0 PRIMARY OSTEOARTHRITIS OF BOTH KNEES: Primary | ICD-10-CM

## 2020-11-17 DIAGNOSIS — G89.29 CHRONIC PAIN OF BOTH KNEES: ICD-10-CM

## 2020-11-17 DIAGNOSIS — M25.562 CHRONIC PAIN OF BOTH KNEES: ICD-10-CM

## 2020-11-17 PROCEDURE — 20610 DRAIN/INJ JOINT/BURSA W/O US: CPT | Performed by: ORTHOPAEDIC SURGERY

## 2020-11-17 RX ORDER — HYALURONATE SODIUM 10 MG/ML
20 SYRINGE (ML) INTRAARTICULAR
Status: COMPLETED | OUTPATIENT
Start: 2020-11-17 | End: 2020-11-17

## 2020-11-17 RX ADMIN — Medication 20 MG: at 14:20

## 2020-11-25 ENCOUNTER — OFFICE VISIT (OUTPATIENT)
Dept: PAIN MEDICINE | Facility: CLINIC | Age: 55
End: 2020-11-25
Payer: MEDICARE

## 2020-11-25 ENCOUNTER — TRANSCRIBE ORDERS (OUTPATIENT)
Dept: RADIOLOGY | Facility: HOSPITAL | Age: 55
End: 2020-11-25

## 2020-11-25 ENCOUNTER — TELEPHONE (OUTPATIENT)
Dept: INTERVENTIONAL RADIOLOGY/VASCULAR | Facility: HOSPITAL | Age: 55
End: 2020-11-25

## 2020-11-25 VITALS — WEIGHT: 276 LBS | TEMPERATURE: 98.2 F | BODY MASS INDEX: 44.36 KG/M2 | HEIGHT: 66 IN

## 2020-11-25 DIAGNOSIS — M54.50 CHRONIC BILATERAL LOW BACK PAIN, UNSPECIFIED WHETHER SCIATICA PRESENT: ICD-10-CM

## 2020-11-25 DIAGNOSIS — M54.16 LUMBAR RADICULOPATHY: Primary | ICD-10-CM

## 2020-11-25 DIAGNOSIS — G89.29 CHRONIC BILATERAL LOW BACK PAIN, UNSPECIFIED WHETHER SCIATICA PRESENT: ICD-10-CM

## 2020-11-25 DIAGNOSIS — E11.42 DIABETIC POLYNEUROPATHY ASSOCIATED WITH TYPE 2 DIABETES MELLITUS (HCC): ICD-10-CM

## 2020-11-25 DIAGNOSIS — N18.9 CHRONIC KIDNEY DISEASE, UNSPECIFIED CKD STAGE: Primary | ICD-10-CM

## 2020-11-25 PROCEDURE — 99214 OFFICE O/P EST MOD 30 MIN: CPT | Performed by: NURSE PRACTITIONER

## 2020-11-25 RX ORDER — PREGABALIN 25 MG/1
CAPSULE ORAL
Qty: 40 CAPSULE | Refills: 1 | Status: SHIPPED | OUTPATIENT
Start: 2020-11-25 | End: 2021-06-11 | Stop reason: HOSPADM

## 2020-12-01 NOTE — PROGRESS NOTES
Vancomycin IV Pharmacy-to-Dose Consultation    Mona Armenta is a 48 y o  female who is currently receiving Vancomycin IV with management by the Pharmacy Consult service  Assessment/Plan:  The patient is currently on vancomycin utilizing pulse dosing  Baseline risks associated with therapy include pre existing renal impairment and dehydration  documented in the chart  Based on todays assessment, continue current vancomycin (day # 2 dosing of (750mg) 10mg/kg based on adjusted body wt when pre-HD LEVEL is <25mcg/ml- correelates with a goal trough of 15-20(appropriate for most indications)mcg/ml  plan for trough to be drawn at 0600 on 08/29/19  Pharmacy will follow the results and clinical progress  We will continue to follow the patients culture results and clinical progress daily      Beatrice Klein, Pharmacist [___] : [unfilled]

## 2020-12-16 ENCOUNTER — PREP FOR PROCEDURE (OUTPATIENT)
Dept: INTERVENTIONAL RADIOLOGY/VASCULAR | Facility: CLINIC | Age: 55
End: 2020-12-16

## 2020-12-16 ENCOUNTER — TRANSCRIBE ORDERS (OUTPATIENT)
Dept: RADIOLOGY | Facility: HOSPITAL | Age: 55
End: 2020-12-16

## 2020-12-16 DIAGNOSIS — N18.9 CHRONIC KIDNEY DISEASE, UNSPECIFIED CKD STAGE: Primary | ICD-10-CM

## 2020-12-16 DIAGNOSIS — E11.39 TYPE 2 DIABETES MELLITUS WITH OTHER OPHTHALMIC COMPLICATION, UNSPECIFIED WHETHER LONG TERM INSULIN USE (HCC): Primary | ICD-10-CM

## 2020-12-17 ENCOUNTER — TELEPHONE (OUTPATIENT)
Dept: RADIOLOGY | Facility: HOSPITAL | Age: 55
End: 2020-12-17

## 2020-12-22 ENCOUNTER — TELEPHONE (OUTPATIENT)
Dept: INPATIENT UNIT | Facility: HOSPITAL | Age: 55
End: 2020-12-22

## 2020-12-23 ENCOUNTER — HOSPITAL ENCOUNTER (OUTPATIENT)
Dept: RADIOLOGY | Facility: HOSPITAL | Age: 55
Discharge: HOME | End: 2020-12-23
Attending: STUDENT IN AN ORGANIZED HEALTH CARE EDUCATION/TRAINING PROGRAM | Admitting: RADIOLOGY
Payer: MEDICARE

## 2020-12-23 ENCOUNTER — TELEPHONE (OUTPATIENT)
Dept: SURGERY | Facility: HOSPITAL | Age: 55
End: 2020-12-23

## 2020-12-23 VITALS
TEMPERATURE: 96.6 F | DIASTOLIC BLOOD PRESSURE: 86 MMHG | SYSTOLIC BLOOD PRESSURE: 138 MMHG | OXYGEN SATURATION: 97 % | BODY MASS INDEX: 44.57 KG/M2 | WEIGHT: 277.34 LBS | HEIGHT: 66 IN | RESPIRATION RATE: 20 BRPM | HEART RATE: 77 BPM

## 2020-12-23 DIAGNOSIS — E11.39 TYPE 2 DIABETES MELLITUS WITH OTHER OPHTHALMIC COMPLICATION, UNSPECIFIED WHETHER LONG TERM INSULIN USE (HCC): ICD-10-CM

## 2020-12-23 LAB — GLUCOSE SERPL-MCNC: 257 MG/DL (ref 65–140)

## 2020-12-23 PROCEDURE — 99152 MOD SED SAME PHYS/QHP 5/>YRS: CPT | Performed by: RADIOLOGY

## 2020-12-23 PROCEDURE — 36902 INTRO CATH DIALYSIS CIRCUIT: CPT

## 2020-12-23 PROCEDURE — 76937 US GUIDE VASCULAR ACCESS: CPT | Performed by: RADIOLOGY

## 2020-12-23 PROCEDURE — 36902 INTRO CATH DIALYSIS CIRCUIT: CPT | Performed by: RADIOLOGY

## 2020-12-23 PROCEDURE — 99152 MOD SED SAME PHYS/QHP 5/>YRS: CPT

## 2020-12-23 PROCEDURE — C1769 GUIDE WIRE: HCPCS

## 2020-12-23 PROCEDURE — G9198 NO ORDER FOR CEPH NO REASON: HCPCS | Performed by: RADIOLOGY

## 2020-12-23 PROCEDURE — 99153 MOD SED SAME PHYS/QHP EA: CPT

## 2020-12-23 PROCEDURE — C1894 INTRO/SHEATH, NON-LASER: HCPCS

## 2020-12-23 PROCEDURE — C1725 CATH, TRANSLUMIN NON-LASER: HCPCS

## 2020-12-23 PROCEDURE — C1887 CATHETER, GUIDING: HCPCS

## 2020-12-23 PROCEDURE — 82948 REAGENT STRIP/BLOOD GLUCOSE: CPT

## 2020-12-23 RX ORDER — FENTANYL CITRATE 50 UG/ML
INJECTION, SOLUTION INTRAMUSCULAR; INTRAVENOUS CODE/TRAUMA/SEDATION MEDICATION
Status: COMPLETED | OUTPATIENT
Start: 2020-12-23 | End: 2020-12-23

## 2020-12-23 RX ORDER — MIDAZOLAM HYDROCHLORIDE 2 MG/2ML
INJECTION, SOLUTION INTRAMUSCULAR; INTRAVENOUS CODE/TRAUMA/SEDATION MEDICATION
Status: COMPLETED | OUTPATIENT
Start: 2020-12-23 | End: 2020-12-23

## 2020-12-23 RX ADMIN — FENTANYL CITRATE 50 MCG: 50 INJECTION INTRAMUSCULAR; INTRAVENOUS at 10:20

## 2020-12-23 RX ADMIN — FENTANYL CITRATE 50 MCG: 50 INJECTION INTRAMUSCULAR; INTRAVENOUS at 10:40

## 2020-12-23 RX ADMIN — FENTANYL CITRATE 50 MCG: 50 INJECTION INTRAMUSCULAR; INTRAVENOUS at 10:09

## 2020-12-23 RX ADMIN — IODIXANOL 35 ML: 320 INJECTION, SOLUTION INTRAVASCULAR at 11:09

## 2020-12-23 RX ADMIN — MIDAZOLAM 1 MG: 1 INJECTION INTRAMUSCULAR; INTRAVENOUS at 10:38

## 2021-01-04 RX ORDER — ERGOCALCIFEROL 1.25 MG/1
CAPSULE ORAL
COMMUNITY
Start: 2020-12-01 | End: 2021-06-11 | Stop reason: HOSPADM

## 2021-01-05 ENCOUNTER — OFFICE VISIT (OUTPATIENT)
Dept: OBGYN CLINIC | Facility: HOSPITAL | Age: 56
End: 2021-01-05
Payer: MEDICARE

## 2021-01-05 VITALS
DIASTOLIC BLOOD PRESSURE: 78 MMHG | HEIGHT: 66 IN | SYSTOLIC BLOOD PRESSURE: 157 MMHG | WEIGHT: 291 LBS | BODY MASS INDEX: 46.77 KG/M2 | HEART RATE: 80 BPM

## 2021-01-05 DIAGNOSIS — M22.2X1 BILATERAL PATELLOFEMORAL SYNDROME: ICD-10-CM

## 2021-01-05 DIAGNOSIS — M22.2X2 BILATERAL PATELLOFEMORAL SYNDROME: ICD-10-CM

## 2021-01-05 DIAGNOSIS — M17.0 BILATERAL PRIMARY OSTEOARTHRITIS OF KNEE: Primary | ICD-10-CM

## 2021-01-05 PROCEDURE — 20610 DRAIN/INJ JOINT/BURSA W/O US: CPT | Performed by: ORTHOPAEDIC SURGERY

## 2021-01-05 PROCEDURE — 99213 OFFICE O/P EST LOW 20 MIN: CPT | Performed by: ORTHOPAEDIC SURGERY

## 2021-01-05 RX ORDER — LIDOCAINE HYDROCHLORIDE 10 MG/ML
2 INJECTION, SOLUTION INFILTRATION; PERINEURAL
Status: COMPLETED | OUTPATIENT
Start: 2021-01-05 | End: 2021-01-05

## 2021-01-05 RX ORDER — BETAMETHASONE SODIUM PHOSPHATE AND BETAMETHASONE ACETATE 3; 3 MG/ML; MG/ML
6 INJECTION, SUSPENSION INTRA-ARTICULAR; INTRALESIONAL; INTRAMUSCULAR; SOFT TISSUE
Status: COMPLETED | OUTPATIENT
Start: 2021-01-05 | End: 2021-01-05

## 2021-01-05 RX ORDER — BLOOD SUGAR DIAGNOSTIC
STRIP MISCELLANEOUS 3 TIMES DAILY
COMMUNITY
Start: 2020-12-30 | End: 2021-06-23

## 2021-01-05 RX ORDER — BUPIVACAINE HYDROCHLORIDE 2.5 MG/ML
2 INJECTION, SOLUTION INFILTRATION; PERINEURAL
Status: COMPLETED | OUTPATIENT
Start: 2021-01-05 | End: 2021-01-05

## 2021-01-05 RX ORDER — LANCETS
EACH MISCELLANEOUS 3 TIMES DAILY
COMMUNITY
Start: 2020-12-30

## 2021-01-05 RX ADMIN — LIDOCAINE HYDROCHLORIDE 2 ML: 10 INJECTION, SOLUTION INFILTRATION; PERINEURAL at 09:26

## 2021-01-05 RX ADMIN — BUPIVACAINE HYDROCHLORIDE 2 ML: 2.5 INJECTION, SOLUTION INFILTRATION; PERINEURAL at 09:26

## 2021-01-05 RX ADMIN — BETAMETHASONE SODIUM PHOSPHATE AND BETAMETHASONE ACETATE 6 MG: 3; 3 INJECTION, SUSPENSION INTRA-ARTICULAR; INTRALESIONAL; INTRAMUSCULAR; SOFT TISSUE at 09:26

## 2021-01-05 NOTE — PROGRESS NOTES
Assessment  Diagnoses and all orders for this visit:    Bilateral primary osteoarthritis of knee    Bilateral patellofemoral syndrome      Discussion and Plan:    -Activities as tolerated  -Bilateral knee injections provided today (as detailed below)  -Follow-up in 3 months for repeat evaluation and repeat injections as needed  -Physical therapy referral provided for Quads strengthening  -Continued follow-up with primary care physician and pain management regarding her pain medication prescriptions    Subjective:   Patient ID: Amita Dominguez is a 50 y o  female      72-year-old female presents for follow-up for bilateral knee pain; right worse than left  Patient states that her knee pain is about the same  No recent traumatic with event or falls  She did receive 3 Visco series injections of Euflexxa 3 months ago, with some relief  She does state that in the past she had received steroid injections with more benefit  Patient is currently has ambulating without any assistive devices  She has a complex past medical history which includes hemodialysis for the past 14 years as well warfarin therapy  She states that she has followed up with pain management and her primary care physician, currently prescribed tramadol with mild relief  The following portions of the patient's history were reviewed and updated as appropriate: allergies, current medications, past family history, past medical history, past social history, past surgical history and problem list     Review of Systems   Constitutional: Negative for appetite change and fever  HENT: Negative for sore throat  Eyes: Negative for visual disturbance  Respiratory: Negative for shortness of breath  Cardiovascular: Negative for chest pain  Gastrointestinal: Negative for nausea and vomiting  Musculoskeletal: Positive for arthralgias and myalgias  Neurological: Negative for numbness         Objective:  /78   Pulse 80   Ht 5' 6" (1 676 m) Wt 132 kg (291 lb)   LMP 02/12/2016 (Exact Date)   BMI 46 97 kg/m²       Right Knee Exam     Muscle Strength   The patient has normal right knee strength  Tenderness   The patient is experiencing tenderness in the lateral joint line  Tests   Varus: negative Valgus: negative  Lachman:  Anterior - negative        Other   Erythema: absent  Pulse: present  Swelling: mild      Left Knee Exam     Muscle Strength   The patient has normal left knee strength  Tenderness   The patient is experiencing tenderness in the lateral joint line  Tests   Varus: negative Valgus: negative  Lachman:  Anterior - negative        Other   Erythema: absent  Pulse: present  Swelling: mild              Physical Exam  Vitals signs and nursing note reviewed  Constitutional:       Appearance: Normal appearance  HENT:      Head: Normocephalic and atraumatic  Nose: Nose normal       Mouth/Throat:      Mouth: Mucous membranes are moist    Eyes:      Extraocular Movements: Extraocular movements intact  Conjunctiva/sclera: Conjunctivae normal    Neck:      Musculoskeletal: Normal range of motion  Cardiovascular:      Rate and Rhythm: Normal rate  Pulses: Normal pulses  Pulmonary:      Effort: Pulmonary effort is normal       Breath sounds: Normal breath sounds  Abdominal:      Palpations: Abdomen is soft  Musculoskeletal:      Comments: See Ortho Exam   Skin:     General: Skin is warm  Capillary Refill: Capillary refill takes less than 2 seconds  Neurological:      General: No focal deficit present  Mental Status: She is alert  Mental status is at baseline     Psychiatric:         Mood and Affect: Mood normal          Behavior: Behavior normal        Large joint arthrocentesis: L knee  Universal Protocol:  Risks and benefits: risks, benefits and alternatives were discussed  Consent given by: patient  Time out: Immediately prior to procedure a "time out" was called to verify the correct patient, procedure, equipment, support staff and site/side marked as required  Timeout called at: 1/5/2021 9:04 AM   Patient understanding: patient states understanding of the procedure being performed  Patient identity confirmed: verbally with patient    Supporting Documentation  Indications: pain   Procedure Details  Location: knee - L knee  Needle size: 22 G  Ultrasound guidance: no  Approach: anterolateral  Medications administered: 6 mg betamethasone acetate-betamethasone sodium phosphate 6 (3-3) mg/mL; 2 mL bupivacaine 0 25 %; 2 mL lidocaine 1 %    Patient tolerance: patient tolerated the procedure well with no immediate complications  Dressing:  Sterile dressing applied    Large joint arthrocentesis: R knee  Universal Protocol:  Consent given by: patient  Time out: Immediately prior to procedure a "time out" was called to verify the correct patient, procedure, equipment, support staff and site/side marked as required  Timeout called at: 1/5/2021 9:04 AM   Patient understanding: patient states understanding of the procedure being performed  Patient identity confirmed: verbally with patient    Supporting Documentation  Indications: pain   Procedure Details  Location: knee - R knee  Preparation: Patient was prepped and draped in the usual sterile fashion  Needle size: 22 G  Ultrasound guidance: no  Approach: anterolateral  Medications administered: 6 mg betamethasone acetate-betamethasone sodium phosphate 6 (3-3) mg/mL; 2 mL bupivacaine 0 25 %; 2 mL lidocaine 1 %    Patient tolerance: patient tolerated the procedure well with no immediate complications  Dressing:  Sterile dressing applied          I have personally reviewed pertinent films in PACS and my interpretation is as follows  Previous x-rays from 08/25/2020 of bilateral knees were reviewed and show mild to moderate degenerative changes in the lateral compartments, and more than    Lateral views reveal advanced degenerative changes in the patellofemoral joint as evidenced by osteophyte formation  Sunrise views not available at this time

## 2021-01-20 DIAGNOSIS — M54.50 CHRONIC BILATERAL LOW BACK PAIN, UNSPECIFIED WHETHER SCIATICA PRESENT: ICD-10-CM

## 2021-01-20 DIAGNOSIS — M54.16 LUMBAR RADICULOPATHY: ICD-10-CM

## 2021-01-20 DIAGNOSIS — G89.29 CHRONIC BILATERAL LOW BACK PAIN, UNSPECIFIED WHETHER SCIATICA PRESENT: ICD-10-CM

## 2021-01-20 DIAGNOSIS — E11.42 DIABETIC POLYNEUROPATHY ASSOCIATED WITH TYPE 2 DIABETES MELLITUS (HCC): ICD-10-CM

## 2021-01-20 RX ORDER — PREGABALIN 25 MG/1
CAPSULE ORAL
Qty: 40 CAPSULE | OUTPATIENT
Start: 2021-01-20

## 2021-01-22 ENCOUNTER — TELEPHONE (OUTPATIENT)
Dept: PAIN MEDICINE | Facility: CLINIC | Age: 56
End: 2021-01-22

## 2021-01-22 PROBLEM — G89.4 CHRONIC PAIN SYNDROME: Status: ACTIVE | Noted: 2021-01-22

## 2021-01-22 NOTE — TELEPHONE ENCOUNTER
Spoke with patient today advising of appointment history  Patient has 2 no shows on 1/22/2021 and 2/14/2020  Advised her to call or reschedule any other appointments in advance to avoid being discharged from practice

## 2021-03-09 DIAGNOSIS — N18.6 ESRD ON HEMODIALYSIS (HCC): Primary | Chronic | ICD-10-CM

## 2021-03-09 DIAGNOSIS — M79.602 PAIN OF LEFT UPPER EXTREMITY: ICD-10-CM

## 2021-03-09 DIAGNOSIS — I77.0 A-V FISTULA (HCC): ICD-10-CM

## 2021-03-09 DIAGNOSIS — Z99.2 ESRD ON HEMODIALYSIS (HCC): Primary | Chronic | ICD-10-CM

## 2021-03-09 NOTE — PROGRESS NOTES
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

## 2021-03-11 ENCOUNTER — APPOINTMENT (OUTPATIENT)
Dept: LAB | Facility: CLINIC | Age: 56
End: 2021-03-11
Payer: MEDICARE

## 2021-03-19 ENCOUNTER — HOSPITAL ENCOUNTER (EMERGENCY)
Facility: HOSPITAL | Age: 56
Discharge: HOME | End: 2021-03-19
Attending: EMERGENCY MEDICINE | Admitting: EMERGENCY MEDICINE
Payer: MEDICARE

## 2021-03-19 VITALS
RESPIRATION RATE: 20 BRPM | DIASTOLIC BLOOD PRESSURE: 81 MMHG | HEIGHT: 66 IN | OXYGEN SATURATION: 99 % | HEART RATE: 83 BPM | TEMPERATURE: 98.1 F | SYSTOLIC BLOOD PRESSURE: 194 MMHG | BODY MASS INDEX: 43.39 KG/M2 | WEIGHT: 270 LBS

## 2021-03-19 DIAGNOSIS — R04.0 LEFT-SIDED EPISTAXIS: Primary | ICD-10-CM

## 2021-03-19 PROCEDURE — 30903 CONTROL OF NOSEBLEED: CPT | Performed by: EMERGENCY MEDICINE

## 2021-03-19 PROCEDURE — 99284 EMERGENCY DEPT VISIT MOD MDM: CPT | Performed by: EMERGENCY MEDICINE

## 2021-03-19 PROCEDURE — 99283 EMERGENCY DEPT VISIT LOW MDM: CPT

## 2021-03-19 RX ORDER — ACETAMINOPHEN 325 MG/1
975 TABLET ORAL ONCE
Status: COMPLETED | OUTPATIENT
Start: 2021-03-19 | End: 2021-03-19

## 2021-03-19 RX ORDER — OXYMETAZOLINE HYDROCHLORIDE 0.05 G/100ML
2 SPRAY NASAL ONCE
Status: COMPLETED | OUTPATIENT
Start: 2021-03-19 | End: 2021-03-19

## 2021-03-19 RX ORDER — TRANEXAMIC ACID 100 MG/ML
1000 INJECTION, SOLUTION INTRAVENOUS ONCE
Status: COMPLETED | OUTPATIENT
Start: 2021-03-19 | End: 2021-03-19

## 2021-03-19 RX ADMIN — ACETAMINOPHEN 975 MG: 325 TABLET, FILM COATED ORAL at 22:18

## 2021-03-19 RX ADMIN — TRANEXAMIC ACID 1000 MG: 1 INJECTION, SOLUTION INTRAVENOUS at 20:36

## 2021-03-19 RX ADMIN — OXYMETAZOLINE HYDROCHLORIDE 2 SPRAY: 0.05 SPRAY NASAL at 20:36

## 2021-03-19 NOTE — ED NOTES
Pt states she has her nose stuffed with tissues  Patient instructed and returned demonstration to hold pressure on nose to help stop the bleeding       Eugenia Mora RN  03/19/21 1951

## 2021-03-20 NOTE — ED ATTENDING ATTESTATION
3/19/2021  I, Toño Ayala MD, saw and evaluated the patient  I have discussed the patient with the resident/non-physician practitioner and agree with the resident's/non-physician practitioner's findings, Plan of Care, and MDM as documented in the resident's/non-physician practitioner's note, except where noted  All available labs and Radiology studies were reviewed  I was present for key portions of any procedure(s) performed by the resident/non-physician practitioner and I was immediately available to provide assistance  At this point I agree with the current assessment done in the Emergency Department  I have conducted an independent evaluation of this patient a history and physical is as follows:    ED Course    patient is a 29-year-old female who presents with left anterior epistaxis patient is currently taking warfarin for prior DVT and clot in heart per patient's report  No recent changes in warfarin  Bleeding began approximately 3 hours prior to arrival   Patient's vitals reviewed  Left anterior naris examined after patient evacuated all clots by blowing her nose  There is minimal bleeding noted on the lateral aspect of the Stephanie nose     Afrin instilled  Direct pressure held for 15 minutes  Patient noted to have some residual oozing from site  Topical TXA applied with direct pressure with improvement  Patient began to rebleed shortly thereafter and was offered anterior nasal pack  Patient was unable to tolerate Merocel pack requested to be terminate pack placement immediately  Initial TXA instilled in direct pressure held  A small piece of Surgicel was applied to the area of bleeding with hemostasis  Patient was observed briefly emergency department with no further rebleeding events  Patient will be discharged home strict return precautions             Critical Care Time  Epistaxis management    Date/Time: 3/19/2021 9:00 PM  Performed by: Toño Ayala MD  Authorized by: Alize Key MD   Universal Protocol:  Procedure performed by:  Consent: Verbal consent obtained  Risks and benefits: risks, benefits and alternatives were discussed  Consent given by: patient  Time out: Immediately prior to procedure a "time out" was called to verify the correct patient, procedure, equipment, support staff and site/side marked as required  Patient understanding: patient states understanding of the procedure being performed  Patient consent: the patient's understanding of the procedure matches consent given  Procedure consent: procedure consent matches procedure scheduled  Required items: required blood products, implants, devices, and special equipment available  Patient identity confirmed: verbally with patient      Patient location:  ED  Anesthesia (see MAR for exact dosages): Anesthesia method:  None  Procedure details:     Treatment site:  L anterior    Hemostasis method:  Other (comment) (Afrin instilled, direct pressure held for 15 minutes, topical TXA x2, attempted anterior pack patient would not tolerate anterior nasal pack was terminated  Small piece of Surgicel placed into left naris with hemostasis achieved)    Approach:  External    Spec Headlamp used: Yes      Treatment complexity:  Limited    Treatment episode: initial    Post-procedure details:     Assessment:  Bleeding stopped    Patient tolerance of procedure:   Tolerated well, no immediate complications

## 2021-03-20 NOTE — ED PROVIDER NOTES
History  Chief Complaint   Patient presents with    Nose Bleed     pt states she is on coumadin,blew her nose and "blood came out of my nose, my eye,and its going down the back of my throat "     53-year-old female presents for evaluation of left-sided epistasis  Patient states over the last 3 hours patient has continued bleeding, patient does admit to being on Coumadin for previous history of DVT  Patient has tried nasal clamping at home without relief of symptoms, patient states occasionally blood seems to come out of her left tear duct  Patient reports a headache, denies lightheadedness, chest pain, dyspnea, syncope  Patient reports previous history of nosebleed, states however states that has been years  Prior to Admission Medications   Prescriptions Last Dose Informant Patient Reported? Taking? ALPRAZolam (XANAX) 0 25 mg tablet  Self Yes No   Sig: Take 0 25 mg by mouth 2 (two) times a day as needed for anxiety   ALPRAZolam (XANAX) 0 5 mg tablet   Yes No   Sig: TAKE 1/2 TO 1 TABLET BY MOUTH EVERY 4 HOURS AS NEEDED FOR ANXIETY  Accu-Chek Esther Plus test strip   Yes No   Sig: 3 (three) times a day Use to test blood sugar   Accu-Chek Softclix Lancets lancets   Yes No   Sig: 3 (three) times a day Use to test blood sugar   B Complex-C-Folic Acid (TRIPHROCAPS) 1 MG CAPS  Self No No   Sig: Take 1 capsule (1 mg total) by mouth daily   Banophen 50 MG capsule   Yes No   CALCIUM CARBONATE PO  Self Yes No   Sig: Take 1,000 mg by mouth daily     KIONEX 15 GM/60ML suspension  Self Yes No   Sig: Take by mouth Takes as a shake on dialysis days Tues-Thurs_Sat   LORazepam (ATIVAN) 1 mg tablet   No No   Sig: Take 1 tablet (1 mg total) by mouth once in imaging for anxiety for up to 1 dose   Melatonin 3 MG CAPS  Self Yes No   Sig: Take 10 mg by mouth daily at bedtime     NOVOLOG FLEXPEN 100 units/mL SOPN   Yes No   Sig: INJECT 1 TO 5 UNITS SUBCUTANEOUSLY THREE TIMES A DAY BEFORE MELAS AS PER SLIDING SCALE [employer] (FLEXITOL HEEL BALM) OINT   No No   Sig: Apply topically once for 1 dose   TIMOLOL MALEATE OP  Self Yes No   Sig: Apply 1 drop to eye 2 (two) times a day   acetaminophen (TYLENOL) 325 mg tablet   No No   Sig: Take 2 tablets (650 mg total) by mouth every 6 (six) hours as needed for mild pain or fever   albuterol (ProAir HFA) 90 mcg/act inhaler   No No   Sig: Inhale 2 puffs every 6 (six) hours as needed for wheezing or shortness of breath   atorvastatin (LIPITOR) 20 mg tablet  Self Yes No   Sig: Take 20 mg by mouth every evening    atropine (ISOPTO ATROPINE) 1 % ophthalmic solution   Yes No   Sig: Administer 1 drop to both eyes daily at bedtime    brimonidine (ALPHAGAN P) 0 15 % ophthalmic solution  Self Yes No   Sig: Administer 1 drop to both eyes 2 (two) times a day    carvedilol (COREG) 12 5 mg tablet   Yes No   Sig: TAKE 1 TABLET BY MOUTH ONCE DAILY EXCEPT DIALYSIS DAYS    carvedilol (COREG) 25 mg tablet  Self Yes No   Sig: Take 25 mg by mouth 2 (two) times a day with meals     cinacalcet (SENSIPAR) 30 mg tablet  Self Yes No   Sig: Take 30 mg by mouth daily   diclofenac sodium (VOLTAREN) 1 %   No No   Sig: Apply 2 g topically 4 (four) times a day   diphenhydrAMINE (BENADRYL) 25 mg capsule  Self Yes No   Sig: Take by mouth as needed for itching     dorzolamide-timolol (COSOPT) 22 3-6 8 MG/ML ophthalmic solution  Self Yes No   Sig: instill 1 drop into both eyes twice a day   ergocalciferol (VITAMIN D2) 50,000 units   Yes No   Sig: take 1 capsule by mouth every week FOR 12 WEEKS   gabapentin (NEURONTIN) 100 mg capsule   No No   Sig: Take 3 capsules (300 mg total) by mouth daily at bedtime   insulin glargine (LANTUS) 100 units/mL subcutaneous injection  Self No No   Sig: Inject 30 Units under the skin daily at bedtime   latanoprost (XALATAN) 0 005 % ophthalmic solution  Self Yes No   Sig: Administer 1 drop to both eyes daily at bedtime   levothyroxine 50 mcg tablet  Self Yes No   Sig: Take 50 mcg by mouth daily   lidocaine (LIDODERM) 5 %   Yes No   Sig: APPLY 1 PATCH BY TRANDERMAL ROUTE EVERY DAY (MAY WEAR UP TO 12 HOURS)  loratadine (CLARITIN) 10 mg tablet   Yes No   Sig: Take 10 mg by mouth daily   losartan (COZAAR) 25 mg tablet  Self No No   Sig: Take 1 tablet (25 mg total) by mouth daily Take on NON-Dialysis Days   methazolamide (NEPTAZANE) 25 MG tablet  Self Yes No   Sig: Take 25 mg by mouth 3 (three) times a day     methocarbamol (ROBAXIN) 500 mg tablet   No No   Sig: Take 1 tablet (500 mg total) by mouth every 8 (eight) hours as needed for muscle spasms   mupirocin (BACTROBAN) 2 % ointment   No No   Sig: Apply topically 3 (three) times a day   nystatin (MYCOSTATIN) powder   No No   Sig: Apply topically 2 (two) times a day   ondansetron (ZOFRAN) 4 mg tablet  Self No No   Sig: Take 1 tablet (4 mg total) by mouth every 8 (eight) hours as needed for nausea or vomiting   pantoprazole (PROTONIX) 40 mg tablet   No No   Sig: Take 1 tablet (40 mg total) by mouth daily in the early morning   prednisoLONE acetate (PRED FORTE) 1 % ophthalmic suspension  Self Yes No   Sig: Administer 1 drop to both eyes 4 (four) times a day Patient takes only occasionally   pregabalin (LYRICA) 25 mg capsule   No No   Sig: Take 1 PO daily   Please take an additional capsule after dialysis   senna (SENOKOT) 8 6 mg   No No   Sig: Take 2 tablets (17 2 mg total) by mouth daily at bedtime as needed for constipation   Patient not taking: Reported on 8/17/2020   sertraline (ZOLOFT) 50 mg tablet  Self No No   Sig: Take 1 tablet (50 mg total) by mouth daily   sevelamer carbonate (RENVELA) 800 mg tablet   Yes No   Sig: TAKE 3 TABLETS BY MOUTH THREE TIMES A DAY WITH MEALS AND 2 TABLETS WITH SNACKS    tobramycin-dexamethasone (TOBRADEX) ophthalmic suspension   No No   Sig: Administer 1 drop into the left eye every 4 (four) hours while awake   torsemide (DEMADEX) 100 mg tablet  Self Yes No   Sig: Take 100 mg by mouth every 12 (twelve) hours traMADol (ULTRAM) 50 mg tablet   No No   Sig: Take 1 tablet (50 mg total) by mouth every 12 (twelve) hours as needed for moderate pain for up to 10 doses   warfarin (COUMADIN) 2 mg tablet   No No   Sig: Take a 2 mg tablet in addition to a 10 mg tablet for a total of 12 mg daily until INR is therapeutic   Patient not taking: Reported on 2020   warfarin (COUMADIN) 3 mg tablet   Yes Yes   Sig: Take 9 mg by mouth daily Takes 9 mg Monday thru Fri  And 12 mg Sat and Sun        Facility-Administered Medications: None       Past Medical History:   Diagnosis Date    Abnormal uterine bleeding (AUB)     Anxiety     Arthritis     Chronic kidney disease     Claustrophobia     Diabetes mellitus [facility] )     Disease of thyroid gland     DVT (deep venous thrombosis) (HCC)     End stage kidney disease (HCC)     Foot ulcer due to secondary DM ([facility] )     [facility]odialysis patient ([facility] )     Tues, Thurs, Sat    Hypertension     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[facility] )     right eye    Morbid obesity (HCC)     Osteomyelitis of fifth toe of right foot (HCC)     Panic attacks     Pulmonary embolism (HCC)     Reflux esophagitis     Thrombophlebitis of saphenous vein     Warfarin anticoagulation        Past Surgical History:   Procedure Laterality Date    AMPUTATION      r 4th toe    ARTERIOVENOUS GRAFT PLACEMENT      CATARACT EXTRACTION Bilateral     CERVICAL BIOPSY  W/ LOOP ELECTRODE EXCISION       SECTION      DIALYSIS FISTULA CREATION      DILATION AND CURETTAGE OF UTERUS      ENDOMETRIAL ABLATION W/ NOVASURE      EYE SURGERY      HYSTERECTOMY      @ age 57 (complete)    IR AV FISTULAGRAM/GRAFTOGRAM  10/11/2019    IR AV FISTULAGRAM/GRAFTOGRAM  2020    IR AV FISTULAGRAM/GRAFTOGRAM  2020    OOPHORECTOMY Bilateral     @ age 57    PERICARDIUM SURGERY      DC AMPUTATION TOE,MT-P JT Right 2020    Procedure: AMPUTATION TOE- 5th;  Surgeon: Luis A Kinney DPM;  Location: [facility] OR;  Service: Podiatry    KS COLONOSCOPY FLX DX W/COLLJ SPEC WHEN PFRMD N/A 3/13/2019    Procedure: COLONOSCOPY;  Surgeon: Madison Lopez MD;  [facility]: [facility]; Service: Gastroenterology    KS ESOPHAGOGASTRODUODENOSCOPY TRANSORAL DIAGNOSTIC N/A 3/13/2019    Procedure: ESOPHAGOGASTRODUODENOSCOPY (EGD); Surgeon: Madison Lopez MD;  [facility]: [facility]; Service: Gastroenterology    KS LAPAROSCOPY W TOT HYSTERECT UTERUS 250 GRAM OR LESS N/A 2/16/2016    Procedure: HYSTERECTOMY LAPAROSCOPIC TOTAL [facility]), with bilateral salpingectomy;  Surgeon: Efrain Sal DO;  [facility];  Service: Gynecology    THROMBECTOMY / ARTERIOVENOUS GRAFT REVISION      TOE SURGERY Right     removal of the 4th toe       Family History   Problem Relation Age of Onset    Heart disease Family     Diabetes Family     Heart disease Mother     Cancer Brother         neck    Throat cancer Brother     Ovarian cancer Maternal Aunt 36     I have reviewed and agree with the history as documented  E-Cigarette/Vaping    E-Cigarette Use Never User      E-Cigarette/Vaping Substances    Nicotine No     THC No     CBD No     Flavoring No     Other No     Unknown No      Social History     Tobacco Use    Smoking status: Never Smoker    Smokeless tobacco: Never Used   Substance Use Topics    Alcohol use: Never     Alcohol/week: 0 0 standard drinks     Frequency: Never     Drinks per session: Patient refused     Binge frequency: Patient refused    Drug use: No        Review of Systems   HENT: Positive for nosebleeds  Respiratory: Negative for shortness of breath  Cardiovascular: Negative for chest pain  Neurological: Positive for headaches  Negative for syncope and light-headedness  All other systems reviewed and are negative        Physical Exam  ED Triage Vitals [03/19/21 1948]   Temperature Pulse Respirations Blood Pressure SpO2   98 1 °F (36 7 °C) 83 20 (!) 194/81 99 %      Temp Source Heart Rate Source Patient Position - Orthostatic VS BP Location FiO2 (%)   Tympanic Monitor Sitting Right arm --      Pain Score       8             Orthostatic Vital Signs  Vitals:    03/19/21 1948   BP: (!) 194/81   Pulse: 83   Patient Position - Orthostatic VS: Sitting       Physical Exam  Vitals signs reviewed  Constitutional:       General: She is not in acute distress  Appearance: Normal appearance  She is not ill-appearing, toxic-appearing or diaphoretic  HENT:      Head: Normocephalic and atraumatic  Right Ear: External ear normal       Left Ear: External ear normal       Nose:      Comments: Evidence of bleeding in left ala area, no source of bleed identified, no profuse bleeding     Mouth/Throat:      Mouth: Mucous membranes are moist       Comments: Blood in posterior oropharynx  Eyes:      General:         Right eye: No discharge  Left eye: No discharge  Cardiovascular:      Rate and Rhythm: Normal rate  Pulmonary:      Effort: Pulmonary effort is normal  No respiratory distress  Musculoskeletal:         General: No deformity or signs of injury  Skin:     General: Skin is warm  Coloration: Skin is not jaundiced or pale  Neurological:      General: No focal deficit present  Mental Status: She is alert  Mental status is at baseline  Comments: No gross CN, motor deficits         ED Medications  Medications   oxymetazoline (AFRIN) 0 05 % nasal spray 2 spray (2 sprays Each Nare Given 3/19/21 2036)   tranexamic acid 100mg/mL (for epistaxis) 1,000 mg (1,000 mg Nasal Given 3/19/21 2036)   acetaminophen (TYLENOL) tablet 975 mg (975 mg Oral Given 3/19/21 2218)       Diagnostic Studies  Results Reviewed     None                 No orders to display         Procedures  Epistaxis management    Date/Time: 3/19/2021 8:42 PM  Performed by: Hao De Jesus DO  Authorized by: Hao De Jesus DO   Universal Protocol:  Procedure performed by: (Dr Evelin Matute)  Consent: Verbal consent obtained    Consent given by: patient  Patient understanding: patient states understanding of the procedure being performed  Required items: required blood products, implants, devices, and special equipment available  Patient identity confirmed: verbally with patient      Unsuccessful Attempt: unsuccessful attempt    Patient location:  ED  Anesthesia (see MAR for exact dosages): Anesthesia method:  None  Procedure details:     Treatment site:  L anterior    Hemostasis method:  Merocel sponge    Approach:  External    Spec Headlamp used: No      Treatment complexity:  Extensive    Treatment episode: initial    Post-procedure details:     Assessment:  No improvement    Patient tolerance of procedure: Tolerated well, no immediate complications  Comments:      Attempt made to resolve bleeding with Afrin and clamp, atomized TXA and clamp, patient did not tolerate merocel packing, last attempt included both Afrin and atomized TXA then surgicel placement  ED Course                                       MDM  Number of Diagnoses or Management Options  Left-sided epistaxis:   Diagnosis management comments: 72-year-old female presents for epistaxis  Will accept control with Afrin, TXA, packing  Likely discharge home with ENT follow-up  Update: patient did not tolerate packing with merocel, patient reports she wants to go home so she can sleep before dialysis  Bleeding is more oozing, okay for patient to leave at this point  One last attempt to control bleeding with TXA/Afrin and surgicel placement prior to discharge  Patient is to return to ED for continued bleeding, change in bleeding, if she becomes symptomatic        Disposition  Final diagnoses:   Left-sided epistaxis     Time reflects when diagnosis was documented in both MDM as applicable and the Disposition within this note     Time User Action Codes Description Comment    3/19/2021 10:00 PM Juan Lujan Add [R04 0] Left-sided epistaxis       ED Disposition     ED Disposition Condition Date/Time Comment    Discharge Stable Fri Mar 19, 2021 10:00 PM Celina Rodriguez discharge to home/self care  Follow-up Information     Follow up With Specialties Details Why [address] [facility]  Schedule an appointment as soon as possible for a visit   [address]  [address] [facility] [place]          Discharge Medication List as of 3/19/2021 10:56 PM      CONTINUE these medications which have NOT CHANGED    Details   !! warfarin (COUMADIN) 3 mg tablet Take 9 mg by mouth daily Takes 9 mg Monday thru Fri  And 12 mg Sat and Sun , Historical Med      Accu-Chek Esther Plus test strip 3 (three) times a day Use to test blood sugar, Starting Wed 12/30/2020, Historical Med      Accu-Chek Softclix Lancets lancets 3 (three) times a day Use to test blood sugar, Starting Wed 12/30/2020, Historical Med      acetaminophen (TYLENOL) 325 mg tablet Take 2 tablets (650 mg total) by mouth every 6 (six) hours as needed for mild pain or fever, Starting Thu 2/20/2020, Print      albuterol (ProAir HFA) 90 mcg/act inhaler Inhale 2 puffs every 6 (six) hours as needed for wheezing or shortness of breath, Starting Fri 2/21/2020, Normal      !! ALPRAZolam (XANAX) 0 25 mg tablet Take 0 25 mg by mouth 2 (two) times a day as needed for anxiety, Historical Med      !! ALPRAZolam (XANAX) 0 5 mg tablet TAKE 1/2 TO 1 TABLET BY MOUTH EVERY 4 HOURS AS NEEDED FOR ANXIETY , Historical Med      atorvastatin (LIPITOR) 20 mg tablet Take 20 mg by mouth every evening , Starting Tue 4/24/2018, Historical Med      atropine (ISOPTO ATROPINE) 1 % ophthalmic solution Administer 1 drop to both eyes daily at bedtime , Starting Mon 2/4/2019, Historical Med      B Complex-C-Folic Acid (TRIPHROCAPS) 1 MG CAPS Take 1 capsule (1 mg total) by mouth daily, Starting Sun 4/28/2019, Print      !!  Banophen 50 MG capsule Starting Sat 10/31/2020, Historical Med      brimonidine (ALPHAGAN P) 0 15 % ophthalmic solution Administer 1 drop to both eyes 2 (two) times a day , Historical Med      CALCIUM CARBONATE PO Take 1,000 mg by mouth daily  , Historical Med      !! carvedilol (COREG) 12 5 mg tablet TAKE 1 TABLET BY MOUTH ONCE DAILY EXCEPT DIALYSIS DAYS , Historical Med      !! carvedilol (COREG) 25 mg tablet Take 25 mg by mouth 2 (two) times a day with meals  , Historical Med      cinacalcet (SENSIPAR) 30 mg tablet Take 30 mg by mouth daily, Historical Med      diclofenac sodium (VOLTAREN) 1 % Apply 2 g topically 4 (four) times a day, Starting Sun 5/19/2019, Print      !! diphenhydrAMINE (BENADRYL) 25 mg capsule Take by mouth as needed for itching  , Historical Med      dorzolamide-timolol (COSOPT) 22 3-6 8 MG/ML ophthalmic solution instill 1 drop into both eyes twice a day, Historical Med      ergocalciferol (VITAMIN D2) 50,000 units take 1 capsule by mouth every week FOR 12 WEEKS, Historical Med      gabapentin (NEURONTIN) 100 mg capsule Take 3 capsules (300 mg total) by mouth daily at bedtime, Starting Fri 2/21/2020, No Print      insulin glargine (LANTUS) 100 units/mL subcutaneous injection Inject 30 Units under the skin daily at bedtime, Starting Sat 8/31/2019, Print      KIONEX 15 GM/60ML suspension Take by mouth Takes as a shake on dialysis days Tues-Thurs_Sat, Starting Mon 12/10/2018, Historical Med      latanoprost (XALATAN) 0 005 % ophthalmic solution Administer 1 drop to both eyes daily at bedtime, Historical Med      levothyroxine 50 mcg tablet Take 50 mcg by mouth daily, Historical Med      lidocaine (LIDODERM) 5 % APPLY 1 PATCH BY TRANDERMAL ROUTE EVERY DAY (MAY WEAR UP TO 12 HOURS)  , Historical Med      loratadine (CLARITIN) 10 mg tablet Take 10 mg by mouth daily, Historical Med      LORazepam (ATIVAN) 1 mg tablet Take 1 tablet (1 mg total) by mouth once in imaging for anxiety for up to 1 dose, Starting Tue 3/31/2020, Normal      losartan (COZAAR) 25 mg tablet Take 1 tablet (25 mg total) by mouth daily Take on NON-Dialysis Days, Starting Wed 5/23/2018, No Print      Melatonin 3 MG CAPS Take 10 mg by mouth daily at bedtime  , Historical Med      methazolamide (NEPTAZANE) 25 MG tablet Take 25 mg by mouth 3 (three) times a day  , Historical Med      methocarbamol (ROBAXIN) 500 mg tablet Take 1 tablet (500 mg total) by mouth every 8 (eight) hours as needed for muscle spasms, Starting Tue 10/20/2020, Print      mupirocin (BACTROBAN) 2 % ointment Apply topically 3 (three) times a day, Starting Tue 10/20/2020, Print      NOVOLOG FLEXPEN 100 units/mL SOPN INJECT 1 TO 5 UNITS SUBCUTANEOUSLY THREE TIMES A DAY BEFORE MELAS AS PER SLIDING SCALE, Historical Med      nystatin (MYCOSTATIN) powder Apply topically 2 (two) times a day, Starting Sat 8/31/2019, Print      ondansetron (ZOFRAN) 4 mg tablet Take 1 tablet (4 mg total) by mouth every 8 (eight) hours as needed for nausea or vomiting, Starting Tue 1/22/2019, Normal      pantoprazole (PROTONIX) 40 mg tablet Take 1 tablet (40 mg total) by mouth daily in the early morning, Starting Fri 2/21/2020, No Print      Podiatric Products (FLEXITOL HEEL BALM) OINT Apply topically once for 1 dose, Starting Wed 3/11/2020, Print      prednisoLONE acetate (PRED FORTE) 1 % ophthalmic suspension Administer 1 drop to both eyes 4 (four) times a day Patient takes only occasionally, Historical Med      pregabalin (LYRICA) 25 mg capsule Take 1 PO daily   Please take an additional capsule after dialysis, Normal      senna (SENOKOT) 8 6 mg Take 2 tablets (17 2 mg total) by mouth daily at bedtime as needed for constipation, Starting Fri 2/21/2020, Normal      sertraline (ZOLOFT) 50 mg tablet Take 1 tablet (50 mg total) by mouth daily, Starting Sun 4/28/2019, Print      sevelamer carbonate (RENVELA) 800 mg tablet TAKE 3 TABLETS BY MOUTH THREE TIMES A DAY WITH MEALS AND 2 TABLETS WITH SNACKS , Historical Med      TIMOLOL MALEATE OP Apply 1 drop to eye 2 (two) times a day, Historical Med tobramycin-dexamethasone (TOBRADEX) ophthalmic suspension Administer 1 drop into the left eye every 4 (four) hours while awake, Starting Tue 8/25/2020, Print      torsemide (DEMADEX) 100 mg tablet Take 100 mg by mouth every 12 (twelve) hours , Starting Wed 1/2/2019, Historical Med      traMADol (ULTRAM) 50 mg tablet Take 1 tablet (50 mg total) by mouth every 12 (twelve) hours as needed for moderate pain for up to 10 doses, Starting Fri 2/21/2020, Normal      !! warfarin (COUMADIN) 2 mg tablet Take a 2 mg tablet in addition to a 10 mg tablet for a total of 12 mg daily until INR is therapeutic, Normal       !! - Potential duplicate medications found  Please discuss with provider  No discharge procedures on file  PDMP Review       Value Time User    PDMP Reviewed  Yes 11/25/2020  3:50 PM Armin Dickens, Scott Andrade           ED Provider  Attending physically available and evaluated Rod Varghesecheryl HAMMOND managed the patient along with the ED Attending      Electronically Signed by         Vladislav Shepherd DO  03/21/21 [id number]

## 2021-03-31 ENCOUNTER — OFFICE VISIT (OUTPATIENT)
Dept: PODIATRY | Facility: CLINIC | Age: 56
End: 2021-03-31
Payer: MEDICARE

## 2021-03-31 VITALS
DIASTOLIC BLOOD PRESSURE: 79 MMHG | SYSTOLIC BLOOD PRESSURE: 139 MMHG | HEART RATE: 70 BPM | BODY MASS INDEX: 45.19 KG/M2 | WEIGHT: 280 LBS

## 2021-03-31 DIAGNOSIS — B35.1 TINEA UNGUIUM: ICD-10-CM

## 2021-03-31 DIAGNOSIS — L84 CORNS AND CALLOSITIES: ICD-10-CM

## 2021-03-31 DIAGNOSIS — M21.611 BUNION, RIGHT: Primary | ICD-10-CM

## 2021-03-31 DIAGNOSIS — E11.42 DIABETIC POLYNEUROPATHY ASSOCIATED WITH TYPE 2 DIABETES MELLITUS (HCC): ICD-10-CM

## 2021-03-31 PROCEDURE — 11055 PARING/CUTG B9 HYPRKER LES 1: CPT | Performed by: PODIATRIST

## 2021-03-31 PROCEDURE — 99214 OFFICE O/P EST MOD 30 MIN: CPT | Performed by: PODIATRIST

## 2021-03-31 PROCEDURE — 11721 DEBRIDE NAIL 6 OR MORE: CPT | Performed by: PODIATRIST

## 2021-03-31 RX ORDER — UREA/ALLANTOIN/VIT E ACETATE 25 %
CREAM (GRAM) TOPICAL ONCE
Qty: 112 G | Refills: 2 | Status: SHIPPED | OUTPATIENT
Start: 2021-03-31 | End: 2021-11-13 | Stop reason: HOSPADM

## 2021-04-01 PROBLEM — E11.621 DIABETIC ULCER OF TOE OF RIGHT FOOT ASSOCIATED WITH TYPE 2 DIABETES MELLITUS, WITH FAT LAYER EXPOSED (HCC): Status: RESOLVED | Noted: 2020-04-07 | Resolved: 2021-04-01

## 2021-04-01 PROBLEM — L97.512 DIABETIC ULCER OF TOE OF RIGHT FOOT ASSOCIATED WITH TYPE 2 DIABETES MELLITUS, WITH FAT LAYER EXPOSED (HCC): Status: RESOLVED | Noted: 2020-04-07 | Resolved: 2021-04-01

## 2021-04-01 NOTE — PATIENT INSTRUCTIONS
Foot Care for People with Diabetes   WHAT YOU NEED TO KNOW:   · Foot care helps protect your feet and prevent foot ulcers or sores  Long-term high blood sugar levels can damage the blood vessels and nerves in your legs and feet  This damage makes it hard to feel pressure, pain, temperature, and touch  You may not be able to feel a cut or sore, or shoes that are too tight  Foot care is needed to prevent serious problems, such as an infection or amputation  · Diabetes may cause your toes to become crooked or curved under  These changes may affect the way you walk and can lead to increased pressure on your foot  The pressure can decrease blood flow to your feet  Lack of blood flow increases your risk for a foot ulcer  Do not ignore small problems, such as dry skin or small wounds  These can become life-threatening over time without proper care  DISCHARGE INSTRUCTIONS:   Call your care team provider if:   · Your feet become numb, weak, or hard to move  · You have pus draining from a sore on your foot  · You have a wound on your foot that gets bigger, deeper, or does not heal      · You see blisters, cuts, scratches, calluses, or sores on your foot  · You have a fever, and your feet become red, warm, and swollen  · Your toenails become thick, curled, or yellow  · You find it hard to check your feet because your vision is poor  · You have questions or concerns about your condition or care  Foot care:   · Check your feet each day  Look at your whole foot, including the bottom, and between and under your toes  Check for wounds, corns, and calluses  Use a mirror to see the bottom of your feet  The skin on your feet may be shiny, tight, or darker than normal  Your feet may also be cold and pale  Feel your feet by running your hands along the tops, bottoms, sides, and between your toes  Redness, swelling, and warmth are signs of blood flow problems that can lead to a foot ulcer   Do not try to remove corns or calluses yourself  · Wash your feet each day with soap and warm water  Do not use hot water, because this can injure your foot  Dry your feet gently with a towel after you wash them  Dry between and under your toes  · Apply lotion or a moisturizer on your dry feet  Ask your care team provider what lotions are best to use  Do not put lotion or moisturizer between your toes  Moisture between your toes could lead to skin breakdown  · Cut your toenails correctly  File or cut your toenails straight across  Use a soft brush to clean around your toenails  If your toenails are very thick, you may need to have a care team provider or specialist cut them  · Protect your feet  Do not walk barefoot or wear your shoes without socks  Check your shoes for rocks or other objects that can hurt your feet  Wear cotton socks to help keep your feet dry  Wear socks without toe seams, or wear them with the seams inside out  Change your socks each day  Do not wear socks that are dirty or damp  · Wear shoes that fit well  Wear shoes that do not rub against any area of your feet  Your shoes should be ½ to ¾ inch (1 to 2 centimeters) longer than your feet  Your shoes should also have extra space around the widest part of your feet  Walking or athletic shoes with laces or straps that adjust are best  Ask your care team provider for help to choose shoes that fit you best  Ask him or her if you need to wear an insert, orthotic, or bandage on your feet  · Do not smoke  Smoking can damage your blood vessels and put you at increased risk for foot ulcers  Ask your care team provider for information if you currently smoke and need help to quit  E-cigarettes or smokeless tobacco still contain nicotine  Talk to your care team provider before you use these products  Follow up with your diabetes care team provider or foot specialist as directed:   You will need to have your feet checked at least once a patrick Lopez may need a foot exam more often if you have nerve damage, foot deformities, or ulcers  Write down your questions so you remember to ask them during your visits  © Copyright [address] Information is for End User's use only and may not be sold, redistributed or otherwise used for commercial purposes  All illustrations and images included in CareNotes® are the copyrighted property of [employer]  or [facility] Carolyn Astorga   The above information is an  only  It is not intended as medical advice for individual conditions or treatments  Talk to your doctor, nurse or pharmacist before following any medical regimen to see if it is safe and effective for you

## 2021-04-01 NOTE — PROGRESS NOTES
Date Patient Seen: 3/31/21       Subjective:     Chief Complaint:  High risk DM footcare     Patient ID: Raine Babb is a 49 y o  female  Court Talya is here for high risk foot care - specifically to have her nails debrided  She is high risk due to history of diabetes and history of toe amputation  She has a new issue with pain in the Right great toe joint  No history of trauma  The pain really acts up a night  The following portions of the patient's history were reviewed and updated as appropriate: allergies, current medications, past family history, past medical history, past social history, past surgical history and problem list     Review of Systems   Constitutional: Negative  Musculoskeletal: Positive for arthralgias  Neurological: Positive for numbness  She notes tingling and burning in her feet, particularly at night  Objective:      /79   Pulse 70   Wt 127 kg (280 lb)   LMP 02/12/2016 (Exact Date)   BMI 45 19 kg/m²        Physical Exam  Vitals signs and nursing note reviewed  Constitutional:       General: She is not in acute distress  Appearance: Normal appearance  She is well-developed  She is obese  She is not ill-appearing, toxic-appearing or diaphoretic  Cardiovascular:      Pulses:           Dorsalis pedis pulses are 1+ on the right side and 1+ on the left side  Posterior tibial pulses are 0 on the right side and 0 on the left side  Pulmonary:      Effort: No respiratory distress  Breath sounds: No wheezing  Musculoskeletal:         General: Tenderness present  Comments: History of right 4th, and 5th digit amputations  Pain on palpation, ROM of the Right first MTPJ  Feet:      Right foot:      Protective Sensation: 10 sites tested  0 sites sensed  Left foot:      Protective Sensation: 10 sites tested  0 sites sensed  Skin:     General: Skin is warm and dry  Capillary Refill: Capillary refill takes less than 2 seconds  Findings: No erythema  Comments: Surgical incision is healed  Nails yellow-brown, 5mm thick, dystrophic, with crumbling subugunal debris x 7  Non-ulcerated callous noted plantar RIght foot  Her skin is shiny, thin and atrophic on the feet  Neurological:      Mental Status: She is alert and oriented to person, place, and time  Comments: Protective sensation diminished b/l  Psychiatric:         Behavior: Behavior normal          Thought Content: Thought content normal          Judgment: Judgment normal             Diagnoses and all orders for this visit:    Bunion, right  -     X-ray foot right 3+ views; Future  -     Uric acid; Future    Corns and callosities  -     [facility] ([address] [place] [address]) Gerlean Barthel; Apply topically once for 1 dose    Diabetic polyneuropathy associated with type 2 diabetes mellitus (HCC)    Tinea unguium         Assessment:  Onychomycosis  Diabetic peripheral neuropathy  Callous  Right great toe pain        Rule out gouty arthritis    Plan:  High risk  foot precautions reviewed with patient including the need to wear protective shoegear at all times when walking including in the home, the need to check feet all surfaces daily with a mirror and report and skin breaks to podiatry, the need to apply an emollient to skin of feet daily  Nails x 8 were debrided with double-action nail forceps of their thickness, length and width  Xrays ordered Right foot  I performed a wet read of the 3/31/21 xrays noting some toro-articular erosions on the medial first metatarsal head (ie Jayce's sign)  In light of this; I am suspicious of acute on chronic gout  Serum uric acid ordered  Callous removed x 1 with scalpel  Lesion Destruction    Date/Time: 3/31/2021 8:02 AM  Performed by: Bella Zazueta DPM  Authorized by: Bella Zazueta DPM   Universal Protocol:  Consent: Verbal consent obtained    Risks and benefits: risks, benefits and alternatives were discussed  Consent given by: patient  Timeout called at: 3/31/2021 8:02 AM   Patient understanding: patient states understanding of the procedure being performed  Patient identity confirmed: verbally with patient      Procedure Details - Lesion Destruction:     Number of Lesions:  1  Lesion 1:     Body area:  Lower extremity    Lower extremity location:  R foot    Malignancy: benign hyperkeratotic lesion      Destruction method: scissors used for extraction

## 2021-04-20 ENCOUNTER — TELEPHONE (OUTPATIENT)
Dept: PAIN MEDICINE | Facility: MEDICAL CENTER | Age: 56
End: 2021-04-20

## 2021-04-20 ENCOUNTER — APPOINTMENT (OUTPATIENT)
Dept: LAB | Facility: CLINIC | Age: 56
End: 2021-04-20
Payer: MEDICARE

## 2021-04-20 DIAGNOSIS — M21.611 BUNION, RIGHT: ICD-10-CM

## 2021-04-20 LAB — URATE SERPL-MCNC: 1.7 MG/DL (ref 2–6.8)

## 2021-04-20 PROCEDURE — 84550 ASSAY OF BLOOD/URIC ACID: CPT

## 2021-04-21 ENCOUNTER — TELEPHONE (OUTPATIENT)
Dept: PODIATRY | Facility: CLINIC | Age: 56
End: 2021-04-21

## 2021-04-21 ENCOUNTER — OFFICE VISIT (OUTPATIENT)
Dept: PODIATRY | Facility: CLINIC | Age: 56
End: 2021-04-21
Payer: MEDICARE

## 2021-04-21 VITALS
SYSTOLIC BLOOD PRESSURE: 185 MMHG | WEIGHT: 280 LBS | HEART RATE: 80 BPM | DIASTOLIC BLOOD PRESSURE: 77 MMHG | BODY MASS INDEX: 45.19 KG/M2

## 2021-04-21 DIAGNOSIS — M79.671 RIGHT FOOT PAIN: ICD-10-CM

## 2021-04-21 DIAGNOSIS — M79.602 PAIN OF LEFT UPPER EXTREMITY: Primary | ICD-10-CM

## 2021-04-21 DIAGNOSIS — E11.42 DIABETIC POLYNEUROPATHY ASSOCIATED WITH TYPE 2 DIABETES MELLITUS (HCC): ICD-10-CM

## 2021-04-21 PROCEDURE — 99213 OFFICE O/P EST LOW 20 MIN: CPT | Performed by: PODIATRIST

## 2021-04-21 NOTE — TELEPHONE ENCOUNTER
Pt wants to be called back-told her she needs to schedule-having back procedure 5/13-will call to schedule AFTER that    Not happy w/ no call back from Dr Jerome Huber

## 2021-04-21 NOTE — TELEPHONE ENCOUNTER
Rosa Isela Araujo called, she is looking for results from the Uric Acid test, as she is in a lot of pain

## 2021-05-01 ENCOUNTER — HOSPITAL ENCOUNTER (EMERGENCY)
Facility: HOSPITAL | Age: 56
Discharge: HOME | End: 2021-05-01
Attending: EMERGENCY MEDICINE | Admitting: EMERGENCY MEDICINE
Payer: MEDICARE

## 2021-05-01 VITALS
HEART RATE: 80 BPM | RESPIRATION RATE: 14 BRPM | TEMPERATURE: 97.7 F | SYSTOLIC BLOOD PRESSURE: 177 MMHG | OXYGEN SATURATION: 98 % | DIASTOLIC BLOOD PRESSURE: 86 MMHG

## 2021-05-01 DIAGNOSIS — Z99.2 HEMODIALYSIS STATUS (HCC): ICD-10-CM

## 2021-05-01 DIAGNOSIS — E11.42 DIABETIC POLYNEUROPATHY ASSOCIATED WITH TYPE 2 DIABETES MELLITUS (HCC): ICD-10-CM

## 2021-05-01 DIAGNOSIS — L02.91 ABSCESS: Primary | ICD-10-CM

## 2021-05-01 DIAGNOSIS — N18.6 END STAGE RENAL DISEASE (HCC): ICD-10-CM

## 2021-05-01 PROCEDURE — 99284 EMERGENCY DEPT VISIT MOD MDM: CPT | Performed by: EMERGENCY MEDICINE

## 2021-05-01 PROCEDURE — 99282 EMERGENCY DEPT VISIT SF MDM: CPT

## 2021-05-01 PROCEDURE — 10061 I&D ABSCESS COMP/MULTIPLE: CPT | Performed by: EMERGENCY MEDICINE

## 2021-05-01 RX ORDER — LIDOCAINE HYDROCHLORIDE AND EPINEPHRINE 10; 10 MG/ML; UG/ML
5 INJECTION, SOLUTION INFILTRATION; PERINEURAL ONCE
Status: COMPLETED | OUTPATIENT
Start: 2021-05-01 | End: 2021-05-01

## 2021-05-01 RX ORDER — ACETAMINOPHEN 325 MG/1
975 TABLET ORAL ONCE
Status: COMPLETED | OUTPATIENT
Start: 2021-05-01 | End: 2021-05-01

## 2021-05-01 RX ORDER — CEPHALEXIN 250 MG/1
500 CAPSULE ORAL ONCE
Status: COMPLETED | OUTPATIENT
Start: 2021-05-01 | End: 2021-05-01

## 2021-05-01 RX ORDER — DOXYCYCLINE HYCLATE 100 MG/1
100 CAPSULE ORAL 2 TIMES DAILY
Qty: 14 CAPSULE | Refills: 0 | Status: SHIPPED | OUTPATIENT
Start: 2021-05-01 | End: 2021-05-08

## 2021-05-01 RX ADMIN — CEPHALEXIN 500 MG: 250 CAPSULE ORAL at 12:30

## 2021-05-01 RX ADMIN — LIDOCAINE HYDROCHLORIDE,EPINEPHRINE BITARTRATE 5 ML: 10; .01 INJECTION, SOLUTION INFILTRATION; PERINEURAL at 12:30

## 2021-05-01 RX ADMIN — ACETAMINOPHEN 975 MG: 325 TABLET, FILM COATED ORAL at 12:29

## 2021-05-01 NOTE — ED ATTENDING ATTESTATION
5/1/2021  I, Thurnell Runner, MD, saw and evaluated the patient  I have discussed the patient with the resident/non-physician practitioner and agree with the resident's/non-physician practitioner's findings, Plan of Care, and MDM as documented in the resident's/non-physician practitioner's note, except where noted  All available labs and Radiology studies were reviewed  I was present for key portions of any procedure(s) performed by the resident/non-physician practitioner and I was immediately available to provide assistance  At this point I agree with the current assessment done in the Emergency Department    I have conducted an independent evaluation of this patient a history and physical is as follows:  Pt started d with pimple on face which has gotten bigger It is tender no fevers PE: alert nad heart reg lungs clear r face with indurated and mild redness and crusting  MDM: will I and d and treat with antibiotics  ED Course         Critical Care Time  Procedures

## 2021-05-01 NOTE — ED TRIAGE NOTES
Patient states she thought she had a pimple on her right temporal area  Pain and swelling gradually increasing  Today patient was at dialysis when the nurse told her she needed to go to the ED to have it checked

## 2021-05-01 NOTE — ED PROVIDER NOTES
History  Chief Complaint   Patient presents with    Cyst     Right temporal     59-year-old female history of ESRD on dialysis, insulin-dependent diabetes presents with facial lesion  Patient notes for the past 4 days she has had an enlarging pimple on the right side of her face anterior to the right ear  She states that it is tender to the touch but has not noted any drainage  She was at dialysis today when staff member recommended that she be evaluated by provider, so she came here to the ED  No other complaints today, denies any constitutional symptoms  States that she had a similar lesion on her right lower extremity years ago  Has been in her usual state of health  Prior to Admission Medications   Prescriptions Last Dose Informant Patient Reported? Taking? ALPRAZolam (XANAX) 0 25 mg tablet  Self Yes No   Sig: Take 0 25 mg by mouth 2 (two) times a day as needed for anxiety   ALPRAZolam (XANAX) 0 5 mg tablet   Yes Yes   Sig: TAKE 1/2 TO 1 TABLET BY MOUTH EVERY 4 HOURS AS NEEDED FOR ANXIETY  Accu-Chek Esther Plus test strip   Yes Yes   Sig: 3 (three) times a day Use to test blood sugar   Accu-Chek Softclix Lancets lancets   Yes Yes   Sig: 3 (three) times a day Use to test blood sugar   B Complex-C-Folic Acid (TRIPHROCAPS) 1 MG CAPS  Self No Yes   Sig: Take 1 capsule (1 mg total) by mouth daily   Banophen 50 MG capsule   Yes No   CALCIUM CARBONATE PO  Self Yes Yes   Sig: Take 1,000 mg by mouth daily     Diclofenac Sodium (Voltaren) 1 % Not Taking at Unknown time  No No   Sig: Apply 2 g topically 4 (four) times a day   Patient not taking: Reported on 5/1/2021   KIONEX 15 GM/60ML suspension  Self Yes No   Sig: Take by mouth Takes as a shake on dialysis days Tues-Thurs_Sat   LORazepam (ATIVAN) 1 mg tablet   No Yes   Sig: Take 1 tablet (1 mg total) by mouth once in imaging for anxiety for up to 1 dose   Melatonin 3 MG CAPS Not Taking at Unknown time Self Yes No   Sig: Take 10 mg by mouth daily at bedtime     NOVOLOG FLEXPEN 100 units/mL SOPN   Yes Yes   Sig: INJECT 1 TO 5 UNITS SUBCUTANEOUSLY THREE TIMES A DAY BEFORE MELAS AS PER SLIDING SCALE   Podiatric Products (Flexitol Heel Balm) OINT   No No   Sig: Apply topically once for 1 dose   TIMOLOL MALEATE OP  Self Yes No   Sig: Apply 1 drop to eye 2 (two) times a day   acetaminophen (TYLENOL) 325 mg tablet   No No   Sig: Take 2 tablets (650 mg total) by mouth every 6 (six) hours as needed for mild pain or fever   albuterol (ProAir HFA) 90 mcg/act inhaler More than a month at Unknown time  No No   Sig: Inhale 2 puffs every 6 (six) hours as needed for wheezing or shortness of breath   atorvastatin (LIPITOR) 20 mg tablet  Self Yes Yes   Sig: Take 20 mg by mouth every evening    atropine (ISOPTO ATROPINE) 1 % ophthalmic solution Not Taking at Unknown time  Yes No   Sig: Administer 1 drop to both eyes daily at bedtime    brimonidine (ALPHAGAN P) 0 15 % ophthalmic solution Not Taking at Unknown time Self Yes No   Sig: Administer 1 drop to both eyes 2 (two) times a day    carvedilol (COREG) 12 5 mg tablet   Yes Yes   Sig: TAKE 1 TABLET BY MOUTH ONCE DAILY EXCEPT DIALYSIS DAYS    carvedilol (COREG) 25 mg tablet  Self Yes No   Sig: Take 25 mg by mouth 2 (two) times a day with meals     cinacalcet (SENSIPAR) 30 mg tablet  Self Yes Yes   Sig: Take 30 mg by mouth daily   diclofenac sodium (VOLTAREN) 1 % Not Taking at Unknown time  No No   Sig: Apply 2 g topically 4 (four) times a day   Patient not taking: Reported on 5/1/2021   diphenhydrAMINE (BENADRYL) 25 mg capsule More than a month at Unknown time Self Yes No   Sig: Take by mouth as needed for itching     dorzolamide-timolol (COSOPT) 22 3-6 8 MG/ML ophthalmic solution Not Taking at Unknown time Self Yes No   Sig: instill 1 drop into both eyes twice a day   ergocalciferol (VITAMIN D2) 50,000 units   Yes Yes   Sig: take 1 capsule by mouth every week FOR 12 WEEKS   gabapentin (NEURONTIN) 100 mg capsule   No Yes   Sig: Take 3 capsules (300 mg total) by mouth daily at bedtime   insulin glargine (LANTUS) 100 units/mL subcutaneous injection  Self No Yes   Sig: Inject 30 Units under the skin daily at bedtime   latanoprost (XALATAN) 0 005 % ophthalmic solution Not Taking at Unknown time Self Yes No   Sig: Administer 1 drop to both eyes daily at bedtime   levothyroxine 50 mcg tablet  Self Yes Yes   Sig: Take 50 mcg by mouth daily   lidocaine (LIDODERM) 5 % Not Taking at Unknown time  Yes No   Sig: APPLY 1 PATCH BY TRANDERMAL ROUTE EVERY DAY (MAY WEAR UP TO 12 HOURS)  loratadine (CLARITIN) 10 mg tablet Not Taking at Unknown time  Yes No   Sig: Take 10 mg by mouth daily   losartan (COZAAR) 25 mg tablet Not Taking at Unknown time Self No No   Sig: Take 1 tablet (25 mg total) by mouth daily Take on NON-Dialysis Days   Patient not taking: Reported on 5/1/2021   methazolamide (NEPTAZANE) 25 MG tablet  Self Yes No   Sig: Take 25 mg by mouth 3 (three) times a day     methocarbamol (ROBAXIN) 500 mg tablet Not Taking at Unknown time  No No   Sig: Take 1 tablet (500 mg total) by mouth every 8 (eight) hours as needed for muscle spasms   Patient not taking: Reported on 5/1/2021   mupirocin (BACTROBAN) 2 % ointment Not Taking at Unknown time  No No   Sig: Apply topically 3 (three) times a day   Patient not taking: Reported on 5/1/2021   nystatin (MYCOSTATIN) powder   No No   Sig: Apply topically 2 (two) times a day   ondansetron (ZOFRAN) 4 mg tablet  Self No No   Sig: Take 1 tablet (4 mg total) by mouth every 8 (eight) hours as needed for nausea or vomiting   pantoprazole (PROTONIX) 40 mg tablet   No Yes   Sig: Take 1 tablet (40 mg total) by mouth daily in the early morning   prednisoLONE acetate (PRED FORTE) 1 % ophthalmic suspension  Self Yes No   Sig: Administer 1 drop to both eyes 4 (four) times a day Patient takes only occasionally   pregabalin (LYRICA) 25 mg capsule Not Taking at Unknown time  No No   Sig: Take 1 PO daily   Please take an additional capsule after dialysis   Patient not taking: Reported on 5/1/2021   senna (SENOKOT) 8 6 mg Not Taking at Unknown time  No No   Sig: Take 2 tablets (17 2 mg total) by mouth daily at bedtime as needed for constipation   Patient not taking: Reported on 5/1/2021   sertraline (ZOLOFT) 50 mg tablet Not Taking at Unknown time Self No No   Sig: Take 1 tablet (50 mg total) by mouth daily   Patient not taking: Reported on 5/1/2021   sevelamer carbonate (RENVELA) 800 mg tablet   Yes No   Sig: TAKE 3 TABLETS BY MOUTH THREE TIMES A DAY WITH MEALS AND 2 TABLETS WITH SNACKS    tobramycin-dexamethasone (TOBRADEX) ophthalmic suspension Not Taking at Unknown time  No No   Sig: Administer 1 drop into the left eye every 4 (four) hours while awake   Patient not taking: Reported on 5/1/2021   torsemide (DEMADEX) 100 mg tablet  Self Yes No   Sig: Take 100 mg by mouth every 12 (twelve) hours    traMADol (ULTRAM) 50 mg tablet Not Taking at Unknown time  No No   Sig: Take 1 tablet (50 mg total) by mouth every 12 (twelve) hours as needed for moderate pain for up to 10 doses   Patient not taking: Reported on 5/1/2021   warfarin (COUMADIN) 2 mg tablet   No No   Sig: Take a 2 mg tablet in addition to a 10 mg tablet for a total of 12 mg daily until INR is therapeutic   Patient taking differently: 12 mg On Sunday ONLY   warfarin (COUMADIN) 3 mg tablet   Yes Yes   Sig: Take 9 mg by mouth daily Takes 9 mg Monday Sat      Facility-Administered Medications: None       Past Medical History:   Diagnosis Date    Abnormal uterine bleeding (AUB)     Anxiety     Arthritis     Chronic kidney disease     Claustrophobia     Diabetes mellitus (HCC)     Disease of thyroid gland     DVT (deep venous thrombosis) (HCC)     End stage kidney disease (HCC)     Foot ulcer due to secondary DM ([facility] )     Hemodialysis patient ([facility] )     Tues, Thurs, Sat    Hypertension     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[facility] )     right eye    Morbid obesity ([address] )     Osteomyelitis of fifth toe of right foot (HCC)     Panic attacks     Pulmonary embolism (HCC)     Reflux esophagitis     Thrombophlebitis of saphenous vein     Warfarin anticoagulation        Past Surgical History:   Procedure Laterality Date    AMPUTATION      r 4th toe    ARTERIOVENOUS GRAFT PLACEMENT      CATARACT EXTRACTION Bilateral     CERVICAL BIOPSY  W/ LOOP ELECTRODE EXCISION       SECTION      DIALYSIS FISTULA CREATION      DILATION AND CURETTAGE OF UTERUS      ENDOMETRIAL ABLATION W/ NOVASURE      EYE SURGERY      HYSTERECTOMY      @ age 57 (complete)    IR AV FISTULAGRAM/GRAFTOGRAM  10/11/2019    IR AV FISTULAGRAM/GRAFTOGRAM  2020    IR AV FISTULAGRAM/GRAFTOGRAM  2020    OOPHORECTOMY Bilateral     @ age 57    PERICARDIUM SURGERY      MA AMPUTATION TOE,MT-P JT Right 2020    Procedure: AMPUTATION TOE- 5th;  Surgeon: Caitlin Perez DPM;  Location: [facility];  Service: Podiatry    MA COLONOSCOPY FLX DX W/LANDON 1978 PFRMD N/A 3/13/2019    Procedure: COLONOSCOPY;  Surgeon: Pipo Castorena MD;  Location: [facility]; Service: Gastroenterology    MA ESOPHAGOGASTRODUODENOSCOPY TRANSORAL DIAGNOSTIC N/A 3/13/2019    Procedure: ESOPHAGOGASTRODUODENOSCOPY (EGD); Surgeon: Pipo Castorena MD;  [facility];   Service: Gastroenterology    MA LAPAROSCOPY W TOT HYSTERECT UTERUS 250 GRAM OR LESS N/A 2016    Procedure: HYSTERECTOMY LAPAROSCOPIC TOTAL [facility]), with bilateral salpingectomy;  Surgeon: Filemon Leonard DO;  [facility];  Service: Gynecology    THROMBECTOMY / ARTERIOVENOUS GRAFT REVISION      TOE SURGERY Right     removal of the 4th toe       Family History   Problem Relation Age of Onset    Heart disease Family     Diabetes Family     Heart disease Mother     Cancer Brother         neck    Throat cancer Brother     Ovarian cancer Maternal Aunt 36     I have reviewed and agree with the history as documented  E-Cigarette/Vaping    E-Cigarette Use Never User      E-Cigarette/Vaping Substances    Nicotine No     THC No     CBD No     Flavoring No     Other No     Unknown No      Social History     Tobacco Use    Smoking status: Never Smoker    Smokeless tobacco: Never Used   Substance Use Topics    Alcohol use: Never     Alcohol/week: 0 0 standard drinks     Frequency: Never     Drinks per session: Patient refused     Binge frequency: Patient refused    Drug use: No        Review of Systems   Constitutional: Negative for chills and fever  HENT: Negative for ear pain and sore throat  Eyes: Negative for pain and visual disturbance  Respiratory: Negative for cough and shortness of breath  Cardiovascular: Negative for chest pain and palpitations  Gastrointestinal: Negative for abdominal pain and vomiting  Genitourinary: Negative for dysuria and hematuria  Musculoskeletal: Negative for arthralgias and back pain  Skin: Negative for rash  As above   Neurological: Negative for seizures and syncope  All other systems reviewed and are negative  Physical Exam  ED Triage Vitals [05/01/21 1134]   Temperature Pulse Respirations Blood Pressure SpO2   97 7 °F (36 5 °C) 80 14 (!) 177/86 98 %      Temp Source Heart Rate Source Patient Position - Orthostatic VS BP Location FiO2 (%)   Tympanic Monitor Sitting Right arm --      Pain Score       9             Orthostatic Vital Signs  Vitals:    05/01/21 1134   BP: (!) 177/86   Pulse: 80   Patient Position - Orthostatic VS: Sitting       Physical Exam  Vitals signs and nursing note reviewed  Constitutional:       General: She is not in acute distress  Appearance: She is well-developed  She is obese  She is not toxic-appearing or diaphoretic  HENT:      Head: Normocephalic  Comments: Area of fluctuance with surrounding induration as per photo   Mild surrounding erythema but overall non cellulitic appearing     Nose: Nose normal       Mouth/Throat:      Mouth: Mucous membranes are moist    Eyes:      General:         Right eye: No discharge  Left eye: No discharge  Extraocular Movements: Extraocular movements intact  Conjunctiva/sclera: Conjunctivae normal       Pupils: Pupils are equal, round, and reactive to light  Neck:      Musculoskeletal: Normal range of motion and neck supple  No neck rigidity  Cardiovascular:      Rate and Rhythm: Normal rate  Pulmonary:      Effort: Pulmonary effort is normal  No respiratory distress  Breath sounds: Normal breath sounds  No stridor  Abdominal:      General: There is no distension  Palpations: Abdomen is soft  Tenderness: There is no abdominal tenderness  There is no guarding or rebound  Skin:     General: Skin is warm and dry  Capillary Refill: Capillary refill takes less than 2 seconds  Neurological:      Mental Status: She is alert and oriented to person, place, and time  Cranial Nerves: No cranial nerve deficit  Psychiatric:         Mood and Affect: Mood normal          Behavior: Behavior normal          ED Medications  Medications   acetaminophen (TYLENOL) tablet 975 mg (975 mg Oral Given 5/1/21 1229)   lidocaine-epinephrine (XYLOCAINE/EPINEPHRINE) 1 %-1:100,000 injection 5 mL (5 mL Infiltration Given by Other 5/1/21 1230)   cephalexin (KEFLEX) capsule 500 mg (500 mg Oral Given 5/1/21 1230)       Diagnostic Studies  Results Reviewed     None                 No orders to display         Procedures  Incision and drain    Date/Time: 5/3/2021 7:56 PM  Performed by: Anamaria Vieira MD  Authorized by: Anamaria Vieira MD   Universal Protocol:  Consent: Verbal consent obtained  Consent given by: patient      Patient location:  ED  Location:     Type:  Abscess    Size:  1 x 1 cm    Location:  Head/neck    Head/neck location:  Face  Anesthesia (see MAR for exact dosages):      Anesthesia method:  Local infiltration    Local anesthetic: Lidocaine 1% WITH epi  Procedure details:     Complexity:  Simple    Incision types:  Stab incision    Scalpel blade:  15    Approach:  Open    Incision depth:  Subcutaneous    Wound management:  Probed and deloculated and irrigated with saline    Drainage:  Purulent and bloody    Drainage amount:  Scant    Packing materials:  None  Post-procedure details:     Patient tolerance of procedure: Tolerated well, no immediate complications          ED Course                                       MDM  Number of Diagnoses or Management Options  Abscess:   Diabetic polyneuropathy associated with type 2 diabetes mellitus ([place] [address] ):   End stage renal disease ([place] [address] ):   Hemodialysis status [facility]):   Diagnosis management comments: I+D with mild purulent drainage  Follow up for wound check, wound care and return precautions discussed  Patient appreciative and in agreement with plan  Disposition  Final diagnoses:   Abscess   Diabetic polyneuropathy associated with type 2 diabetes mellitus ([place] [address] )   End stage renal disease ([place] [address] )   Hemodialysis status ([place] [address] )     Time reflects when diagnosis was documented in both MDM as applicable and the Disposition within this note     Time User Action Codes Description Comment    5/1/2021  1:33 PM Henrene Mally Add [L02 91] Abscess     5/1/2021  1:33 PM Jude Flood [E11 42] Diabetic polyneuropathy associated with type 2 diabetes mellitus ([place] [address] )     5/1/2021  1:33 PM Henrene Mally Add [N18 6] End stage renal disease ([place] [address] )     5/1/2021  1:33 PM Henrene Mally Add [Z99 2] Hemodialysis status [facility])       ED Disposition     ED Disposition Condition Date/Time Comment    Discharge Stable Sat May 1, 2021  1:23 PM Dameon Romero discharge to home/self care              Follow-up Information     Follow up With Specialties Details Why Contact Info Additional Information    Amanda Hoskins MD Internal Medicine Go in 2 days For wound re-check [facility] [address] [facility] Emergency Department Emergency Medicine Go to  If symptoms worsen [address]  [address] [facility] [address] Emergency Department, [address], [place], [place], [address]          Discharge Medication List as of 5/1/2021  1:36 PM      START taking these medications    Details   doxycycline hyclate (VIBRAMYCIN) 100 mg capsule Take 1 capsule (100 mg total) by mouth 2 (two) times a day for 7 days, Starting Sat 5/1/2021, Until Sat 5/8/2021, Print         CONTINUE these medications which have NOT CHANGED    Details   Accu-Chek Esther Plus test strip 3 (three) times a day Use to test blood sugar, Starting Wed 12/30/2020, Historical Med      Accu-Chek Softclix Lancets lancets 3 (three) times a day Use to test blood sugar, Starting Wed 12/30/2020, Historical Med      !! ALPRAZolam (XANAX) 0 5 mg tablet TAKE 1/2 TO 1 TABLET BY MOUTH EVERY 4 HOURS AS NEEDED FOR ANXIETY , Historical Med      atorvastatin (LIPITOR) 20 mg tablet Take 20 mg by mouth every evening , Starting Tue 4/24/2018, Historical Med      B Complex-C-Folic Acid (TRIPHROCAPS) 1 MG CAPS Take 1 capsule (1 mg total) by mouth daily, Starting Sun 4/28/2019, Print      CALCIUM CARBONATE PO Take 1,000 mg by mouth daily  , Historical Med      !! carvedilol (COREG) 12 5 mg tablet TAKE 1 TABLET BY MOUTH ONCE DAILY EXCEPT DIALYSIS DAYS , Historical Med      cinacalcet (SENSIPAR) 30 mg tablet Take 30 mg by mouth daily, Historical Med      ergocalciferol (VITAMIN D2) 50,000 units take 1 capsule by mouth every week FOR 12 WEEKS, Historical Med      gabapentin (NEURONTIN) 100 mg capsule Take 3 capsules (300 mg total) by mouth daily at bedtime, Starting Fri 2/21/2020, No Print      insulin glargine (LANTUS) 100 units/mL subcutaneous injection Inject 30 Units under the skin daily at bedtime, Starting Sat 8/31/2019, Print      levothyroxine 50 mcg tablet Take 50 mcg by mouth daily, Historical Med      LORazepam (ATIVAN) 1 mg tablet Take 1 tablet (1 mg total) by mouth once in imaging for anxiety for up to 1 dose, Starting Tue 3/31/2020, Normal      NOVOLOG FLEXPEN 100 units/mL SOPN INJECT 1 TO 5 UNITS SUBCUTANEOUSLY THREE TIMES A DAY BEFORE MELAS AS PER SLIDING SCALE, Historical Med      pantoprazole (PROTONIX) 40 mg tablet Take 1 tablet (40 mg total) by mouth daily in the early morning, Starting Fri 2/21/2020, No Print      !! warfarin (COUMADIN) 3 mg tablet Take 9 mg by mouth daily Takes 9 mg Monday Sat, Historical Med      acetaminophen (TYLENOL) 325 mg tablet Take 2 tablets (650 mg total) by mouth every 6 (six) hours as needed for mild pain or fever, Starting Thu 2/20/2020, Print      albuterol (ProAir HFA) 90 mcg/act inhaler Inhale 2 puffs every 6 (six) hours as needed for wheezing or shortness of breath, Starting Fri 2/21/2020, Normal      !! ALPRAZolam (XANAX) 0 25 mg tablet Take 0 25 mg by mouth 2 (two) times a day as needed for anxiety, Historical Med      atropine (ISOPTO ATROPINE) 1 % ophthalmic solution Administer 1 drop to both eyes daily at bedtime , Starting Mon 2/4/2019, Historical Med      !!  Banophen 50 MG capsule Starting Sat 10/31/2020, Historical Med      brimonidine (ALPHAGAN P) 0 15 % ophthalmic solution Administer 1 drop to both eyes 2 (two) times a day , Historical Med      !! carvedilol (COREG) 25 mg tablet Take 25 mg by mouth 2 (two) times a day with meals  , Historical Med      diclofenac sodium (VOLTAREN) 1 % Apply 2 g topically 4 (four) times a day, Starting Sun 5/19/2019, Print      Diclofenac Sodium (Voltaren) 1 % Apply 2 g topically 4 (four) times a day, Starting Wed 4/21/2021, Normal      !! diphenhydrAMINE (BENADRYL) 25 mg capsule Take by mouth as needed for itching  , Historical Med      dorzolamide-timolol (COSOPT) 22 3-6 8 MG/ML ophthalmic solution instill 1 drop into both eyes twice a day, Historical Med      KIONEX 15 GM/60ML suspension Take by mouth Takes as a shake on dialysis days Tues-Thurs_Sat, Starting Mon 12/10/2018, Historical Med      latanoprost (XALATAN) 0 005 % ophthalmic solution Administer 1 drop to both eyes daily at bedtime, Historical Med      lidocaine (LIDODERM) 5 % APPLY 1 PATCH BY TRANDERMAL ROUTE EVERY DAY (MAY WEAR UP TO 12 HOURS)  , Historical Med      loratadine (CLARITIN) 10 mg tablet Take 10 mg by mouth daily, Historical Med      losartan (COZAAR) 25 mg tablet Take 1 tablet (25 mg total) by mouth daily Take on NON-Dialysis Days, Starting Wed 5/23/2018, No Print      Melatonin 3 MG CAPS Take 10 mg by mouth daily at bedtime  , Historical Med      methazolamide (NEPTAZANE) 25 MG tablet Take 25 mg by mouth 3 (three) times a day  , Historical Med      methocarbamol (ROBAXIN) 500 mg tablet Take 1 tablet (500 mg total) by mouth every 8 (eight) hours as needed for muscle spasms, Starting Tue 10/20/2020, Print      mupirocin (BACTROBAN) 2 % ointment Apply topically 3 (three) times a day, Starting Tue 10/20/2020, Print      nystatin (MYCOSTATIN) powder Apply topically 2 (two) times a day, Starting Sat 8/31/2019, Print      ondansetron (ZOFRAN) 4 mg tablet Take 1 tablet (4 mg total) by mouth every 8 (eight) hours as needed for nausea or vomiting, Starting Tue 1/22/2019, Normal      Podiatric Products (Flexitol Heel Balm) OINT Apply topically once for 1 dose, Starting Wed 3/31/2021, Print      prednisoLONE acetate (PRED FORTE) 1 % ophthalmic suspension Administer 1 drop to both eyes 4 (four) times a day Patient takes only occasionally, Historical Med      pregabalin (LYRICA) 25 mg capsule Take 1 PO daily   Please take an additional capsule after dialysis, Normal      senna (SENOKOT) 8 6 mg Take 2 tablets (17 2 mg total) by mouth daily at bedtime as needed for constipation, Starting Fri 2/21/2020, Normal      sertraline (ZOLOFT) 50 mg tablet Take 1 tablet (50 mg total) by mouth daily, Starting Sun 4/28/2019, Print sevelamer carbonate (RENVELA) 800 mg tablet TAKE 3 TABLETS BY MOUTH THREE TIMES A DAY WITH MEALS AND 2 TABLETS WITH SNACKS , Historical Med      TIMOLOL MALEATE OP Apply 1 drop to eye 2 (two) times a day, Historical Med      tobramycin-dexamethasone (TOBRADEX) ophthalmic suspension Administer 1 drop into the left eye every 4 (four) hours while awake, Starting Tue 8/25/2020, Print      torsemide (DEMADEX) 100 mg tablet Take 100 mg by mouth every 12 (twelve) hours , Starting Wed 1/2/2019, Historical Med      traMADol (ULTRAM) 50 mg tablet Take 1 tablet (50 mg total) by mouth every 12 (twelve) hours as needed for moderate pain for up to 10 doses, Starting Fri 2/21/2020, Normal      !! warfarin (COUMADIN) 2 mg tablet Take a 2 mg tablet in addition to a 10 mg tablet for a total of 12 mg daily until INR is therapeutic, Normal       !! - Potential duplicate medications found  Please discuss with provider  No discharge procedures on file  PDMP Review       Value Time User    PDMP Reviewed  Yes 11/25/2020  3:50 PM Scott Rosario           ED Provider  Attending physically available and evaluated Whit Novoa I managed the patient along with the ED Attending      Electronically Signed by         Dax Cardenas MD  05/03/21 2003

## 2021-05-01 NOTE — DISCHARGE INSTRUCTIONS
Please take ACETAMINOPHEN (aka Tylenol) 650 mg every 6 hours as needed for pain  Please return to the Emergency Department if your symptoms worsen or if you develop any new or concerning symptoms

## 2021-05-04 ENCOUNTER — DOCUMENTATION (OUTPATIENT)
Dept: NEPHROLOGY | Facility: CLINIC | Age: 56
End: 2021-05-04

## 2021-05-04 DIAGNOSIS — S01.80XA OPEN FACIAL WOUND, INITIAL ENCOUNTER: Primary | ICD-10-CM

## 2021-05-06 ENCOUNTER — OFFICE VISIT (OUTPATIENT)
Dept: PODIATRY | Facility: CLINIC | Age: 56
End: 2021-05-06
Payer: MEDICARE

## 2021-05-06 VITALS
DIASTOLIC BLOOD PRESSURE: 80 MMHG | SYSTOLIC BLOOD PRESSURE: 175 MMHG | WEIGHT: 284 LBS | HEART RATE: 75 BPM | BODY MASS INDEX: 45.84 KG/M2

## 2021-05-06 DIAGNOSIS — E11.42 TYPE 2 DIABETES MELLITUS WITH DIABETIC POLYNEUROPATHY, UNSPECIFIED WHETHER LONG TERM INSULIN USE (HCC): ICD-10-CM

## 2021-05-06 DIAGNOSIS — M79.671 RIGHT FOOT PAIN: Primary | ICD-10-CM

## 2021-05-06 PROBLEM — L97.512 RIGHT FOOT ULCER, WITH FAT LAYER EXPOSED (HCC): Status: RESOLVED | Noted: 2019-08-26 | Resolved: 2021-05-06

## 2021-05-06 PROCEDURE — 99213 OFFICE O/P EST LOW 20 MIN: CPT | Performed by: PODIATRIST

## 2021-05-06 PROCEDURE — 20550 NJX 1 TENDON SHEATH/LIGAMENT: CPT | Performed by: PODIATRIST

## 2021-05-06 NOTE — PROGRESS NOTES
This patient was seen on 5/6/21  My role is Foot , Ankle, and Wound Specialist    SUBJECTIVE    Chief Complaint:  Foot pain     Patient ID: Roxanna Mcardle is a 49 y o  female  Tulio Bridegroom is here for her Right foot pain pointing to the 1st MTPJ  I prescribed lidoderm patches last time and she notes they have given her 50% relief  I had declined injection last time as her sugars were really high; she states this AM her glucose was 130mg/dl  The following portions of the patient's history were reviewed and updated as appropriate: allergies, current medications, past family history, past medical history, past social history, past surgical history and problem list     Review of Systems   Constitutional: Negative  Musculoskeletal: Positive for arthralgias  Neurological: Positive for numbness  She notes tingling and burning in her feet, particularly at night  OBJECTIVE      BP (!) 175/80   Pulse 75   Wt 129 kg (284 lb)   LMP 02/12/2016 (Exact Date)   BMI 45 84 kg/m²     Foot/Ankle Musculoskeletal Exam    General        Constitutional: well-developed    Neurological: alert      General additional comments: History of right 4th, and 5th digit amputations  Pain on palpation, ROM of the Right first MTPJ  Neurovascular      Neurovascular - Right        Dorsalis pedis: 1+      Posterior tibial: 0      Neurovascular - Left        Dorsalis pedis: 1+      Posterior tibial: 0       Physical Exam  Vitals signs and nursing note reviewed  Constitutional:       General: She is not in acute distress  Appearance: Normal appearance  She is well-developed  She is obese  She is not ill-appearing, toxic-appearing or diaphoretic  Cardiovascular:      Pulses:           Dorsalis pedis pulses are 1+ on the right side and 1+ on the left side  Posterior tibial pulses are 0 on the right side and 0 on the left side  Pulmonary:      Effort: No respiratory distress  Breath sounds: No wheezing  Musculoskeletal:         General: Tenderness present  Comments: History of right 4th, and 5th digit amputations  Pain on palpation, ROM of the Right first MTPJ  Feet:      Right foot:      Protective Sensation: 10 sites tested  0 sites sensed  Left foot:      Protective Sensation: 10 sites tested  0 sites sensed  Skin:     General: Skin is warm and dry  Capillary Refill: Capillary refill takes less than 2 seconds  Findings: No erythema  Comments: Surgical incision is healed  Nails yellow-brown, 5mm thick, dystrophic, with crumbling subugunal debris x 7  Non-ulcerated callous noted plantar RIght foot  Her skin is shiny, thin and atrophic on the feet  Neurological:      Mental Status: She is alert and oriented to person, place, and time  Comments: Protective sensation diminished b/l  Psychiatric:         Behavior: Behavior normal          Thought Content: Thought content normal          Judgment: Judgment normal              ASSESSMENT     Diagnoses and all orders for this visit:    Type 2 diabetes mellitus with diabetic polyneuropathy, unspecified whether long term insulin use (HCC)    Right foot pain         Problem List Items Addressed This Visit        Endocrine    Diabetes mellitus with neurological manifestation ([facility] [address] ) - Primary       Other    Right foot pain            Problems:    Right 1st MTPJ pain    PLAN    I offered her a trigger point injection of corticosteroid in light of her more controlled BGM's this week  A formal timeout including patient identification, laterality and existing allergies using [facility] protocol was conducted  Skin at the injection site was cleansed with betadine followed by alcohol  I injected (after sterile prep of the skin) a 2 0 1:1 mixture of 0 5% Marcaine and Kenelog 40  The injection was given at the dorsal 1st MTPJ Right  The patient tolerated it well       I recommend she monitor her BGM's closely for elevation in the next 48 hours  I recommended she continue the lidoderm patches

## 2021-05-18 NOTE — PROGRESS NOTES
This patient was seen on 4/21/21  My role is Foot , Ankle, and Wound Specialist    SUBJECTIVE    Chief Complaint:  Foot pain Right     Patient ID: Stanislav Marie is a 49 y o  female  Ta Clark is here for an unscheduled appointment for foot pain  She points to her great toe Right  She denies trauma  She relates a sudden onset  No drainage  No fevers  Her sugars have been averaging 180mg/dl  She's having a lot of anxiety as well she states  The following portions of the patient's history were reviewed and updated as appropriate: allergies, current medications, past family history, past medical history, past social history, past surgical history and problem list     Review of Systems   Constitutional: Positive for activity change  Negative for appetite change, chills, diaphoresis, fatigue, fever and unexpected weight change  Musculoskeletal: Positive for arthralgias, gait problem and joint swelling  Psychiatric/Behavioral: The patient is nervous/anxious  OBJECTIVE      BP (!) 185/77   Pulse 80   Wt 127 kg (280 lb)   LMP 02/12/2016 (Exact Date)   BMI 45 19 kg/m²     Foot/Ankle Musculoskeletal Exam    General      Neurological: alert      General additional comments: I note pain without erythema, bruising Right hallux  She gets point tenderness over the first MTPJ  Slight edema noted  Physical Exam  Vitals signs and nursing note reviewed  Constitutional:       General: She is not in acute distress  Appearance: Normal appearance  She is not ill-appearing, toxic-appearing or diaphoretic  Cardiovascular:      Pulses: Normal pulses  Pulmonary:      Effort: Pulmonary effort is normal    Musculoskeletal:      Comments: I note pain without erythema, bruising Right hallux  She gets point tenderness over the first MTPJ  Slight edema noted  Neurological:      Mental Status: She is alert and oriented to person, place, and time               ASSESSMENT     Diagnoses and all orders for this visit:    Pain of left upper extremity  -     Diclofenac Sodium (Voltaren) 1 %; Apply 2 g topically 4 (four) times a day    Diabetic polyneuropathy associated with type 2 diabetes mellitus (HCC)    Right foot pain         Problem List Items Addressed This Visit        Endocrine    Diabetic polyneuropathy associated with type 2 diabetes mellitus (HCC)       Other    Limb pain - Primary    Relevant Medications    Diclofenac Sodium (Voltaren) 1 %    Right foot pain            Problems:      Undiagnosed New Problem with Uncertain Prognosis:  1  Foot pain Right  Rule out gout  Doubt infection  Defer injection due to high blood sugars    PLAN  Review serum uric acid  Rx for lidoderm patch  Consider injection next visit if BGM's better controlled

## 2021-05-19 ENCOUNTER — OFFICE VISIT (OUTPATIENT)
Dept: OBGYN CLINIC | Facility: HOSPITAL | Age: 56
End: 2021-05-19
Payer: MEDICARE

## 2021-05-19 VITALS
BODY MASS INDEX: 44.84 KG/M2 | WEIGHT: 279 LBS | HEART RATE: 84 BPM | HEIGHT: 66 IN | DIASTOLIC BLOOD PRESSURE: 78 MMHG | SYSTOLIC BLOOD PRESSURE: 160 MMHG

## 2021-05-19 DIAGNOSIS — M25.562 CHRONIC PAIN OF BOTH KNEES: ICD-10-CM

## 2021-05-19 DIAGNOSIS — M17.0 PRIMARY OSTEOARTHRITIS OF BOTH KNEES: Primary | ICD-10-CM

## 2021-05-19 DIAGNOSIS — M25.561 CHRONIC PAIN OF BOTH KNEES: ICD-10-CM

## 2021-05-19 DIAGNOSIS — G89.29 CHRONIC PAIN OF BOTH KNEES: ICD-10-CM

## 2021-05-19 PROCEDURE — 20610 DRAIN/INJ JOINT/BURSA W/O US: CPT | Performed by: ORTHOPAEDIC SURGERY

## 2021-05-19 PROCEDURE — 99213 OFFICE O/P EST LOW 20 MIN: CPT | Performed by: ORTHOPAEDIC SURGERY

## 2021-05-19 RX ORDER — BUPIVACAINE HYDROCHLORIDE 2.5 MG/ML
2 INJECTION, SOLUTION INFILTRATION; PERINEURAL
Status: COMPLETED | OUTPATIENT
Start: 2021-05-19 | End: 2021-05-19

## 2021-05-19 RX ORDER — LIDOCAINE HYDROCHLORIDE 10 MG/ML
2 INJECTION, SOLUTION INFILTRATION; PERINEURAL
Status: COMPLETED | OUTPATIENT
Start: 2021-05-19 | End: 2021-05-19

## 2021-05-19 RX ORDER — BETAMETHASONE SODIUM PHOSPHATE AND BETAMETHASONE ACETATE 3; 3 MG/ML; MG/ML
12 INJECTION, SUSPENSION INTRA-ARTICULAR; INTRALESIONAL; INTRAMUSCULAR; SOFT TISSUE
Status: COMPLETED | OUTPATIENT
Start: 2021-05-19 | End: 2021-05-19

## 2021-05-19 RX ADMIN — BUPIVACAINE HYDROCHLORIDE 2 ML: 2.5 INJECTION, SOLUTION INFILTRATION; PERINEURAL at 14:09

## 2021-05-19 RX ADMIN — BETAMETHASONE SODIUM PHOSPHATE AND BETAMETHASONE ACETATE 12 MG: 3; 3 INJECTION, SUSPENSION INTRA-ARTICULAR; INTRALESIONAL; INTRAMUSCULAR; SOFT TISSUE at 14:09

## 2021-05-19 RX ADMIN — LIDOCAINE HYDROCHLORIDE 2 ML: 10 INJECTION, SOLUTION INFILTRATION; PERINEURAL at 14:09

## 2021-05-19 NOTE — PROGRESS NOTES
Assessment:   Diagnosis ICD-10-CM Associated Orders   1  Primary osteoarthritis of both knees  M17 0 Large joint arthrocentesis: bilateral knee     Injection procedure prior authorization   2  Chronic pain of both knees  M25 561 Large joint arthrocentesis: bilateral knee    M25 562 Injection procedure prior authorization    G89 29        Plan:  Diagnosis, treatment options and associated risks were discussed with the patient including no treatment, nonsurgical treatment and potential for surgical intervention  The patient was given the opportunity to ask questions regarding each  Patient was offered, accepted, performed injection of cortisone to both knees for symptomatic relief  Patient tolerated both injections well  Ice and post injection protocol advised  Weightbearing activities as tolerated  Recommend she continues care with her spine and pain practitioner regarding her right-sided lumbar radiculopathy  Follow-up in approximately 6 weeks for repeat evaluation at that time will have viscosupplementation for both knees  To do next visit:  Return for re-check in 6 weeks for visco #1 B/L knees  The above stated was discussed in layman's terms and the patient expressed understanding  All questions were answered to the patient's satisfaction  Scribe Attestation    I,:  Payton Hoskins am acting as a scribe while in the presence of the attending physician :       I,:  Dona Agrawal MD personally performed the services described in this documentation    as scribed in my presence :             Subjective:   Baylee Loja is a 49 y o  female who presents today for repeat evaluation of her bilateral knees due to return of pain  She has known osteoarthritis most significantly in her patellofemoral compartments  At her visit 4 months ago both knees were injected with cortisone with few weeks relief    She previously had viscosupplementation injection series greater than 6 months ago with relief as well   She is seeking treatment through [facility] for her lower back pain and right lumbar radiculopathy with injections without relief  She has been taking Tylenol only as she does not have kidneys  Review of systems negative unless otherwise specified in HPI  Review of Systems    Past Medical History:   Diagnosis Date    Abnormal uterine bleeding (AUB)     Anxiety     Arthritis     Chronic kidney disease     Claustrophobia     Diabetes mellitus ([facility] [address] )     Disease of thyroid gland     DVT (deep venous thrombosis) (HCC)     End stage kidney disease (HCC)     Foot ulcer due to secondary DM ([facility] [address] )     Hemodialysis patient ([facility] [address] )     Nedes, Thchandu, Sat    Hypertension     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[facility] [address] )     right eye    Morbid obesity ([facility] [address] )     Osteomyelitis of fifth toe of right foot ([facility] [address] )     Panic attacks     Pulmonary embolism (HCC)     Reflux esophagitis     Thrombophlebitis of saphenous vein     Warfarin anticoagulation        Past Surgical History:   Procedure Laterality Date    AMPUTATION      r 4th toe    ARTERIOVENOUS GRAFT PLACEMENT      CATARACT EXTRACTION Bilateral     CERVICAL BIOPSY  W/ LOOP ELECTRODE EXCISION       SECTION      DIALYSIS FISTULA CREATION      DILATION AND CURETTAGE OF UTERUS      ENDOMETRIAL ABLATION W/ NOVASURE      EYE SURGERY      HYSTERECTOMY      @ age 57 (complete)    IR AV FISTULAGRAM/GRAFTOGRAM  10/11/2019    IR AV FISTULAGRAM/GRAFTOGRAM  2020    IR AV FISTULAGRAM/GRAFTOGRAM  2020    OOPHORECTOMY Bilateral     @ age 57    PERICARDIUM SURGERY      ND AMPUTATION TOE,MT-P JT Right 2020    Procedure: AMPUTATION TOE- 5th;  Surgeon: Farhat Jay DPM;  Location: [facility];  Service: Podiatry    ND COLONOSCOPY FLX DX W/COLLJ SPEC WHEN PFRMD N/A 3/13/2019    Procedure: COLONOSCOPY;  Surgeon: Marelyn Hammans, MD;  [facility]: [facility];   Service: Gastroenterology    ND ESOPHAGOGASTRODUODENOSCOPY TRANSORAL DIAGNOSTIC N/A 3/13/2019    Procedure: ESOPHAGOGASTRODUODENOSCOPY (EGD); Surgeon: Pipo Castorena MD;  [facility]: [facility];   Service: Gastroenterology    NM LAPAROSCOPY W TOT HYSTERECT UTERUS 250 GRAM OR LESS N/A 2/16/2016    Procedure: HYSTERECTOMY LAPAROSCOPIC TOTAL ([address]), with bilateral salpingectomy;  Surgeon: Filemon Leonard DO;  [facility];  Service: Gynecology    THROMBECTOMY / ARTERIOVENOUS GRAFT REVISION      TOE SURGERY Right     removal of the 4th toe       Family History   Problem Relation Age of Onset    Heart disease Family     Diabetes Family     Heart disease Mother     Cancer Brother         neck    Throat cancer Brother     Ovarian cancer Maternal Aunt 44       Social History     Occupational History    Not on file   Tobacco Use    Smoking status: Never Smoker    Smokeless tobacco: Never Used   Substance and Sexual Activity    Alcohol use: Never     Alcohol/week: 0 0 standard drinks     Frequency: Never     Drinks per session: Patient refused     Binge frequency: Patient refused    Drug use: No    Sexual activity: Not Currently     Partners: Male     Birth control/protection: Abstinence         Current Outpatient Medications:     Accu-Chek Esther Plus test strip, 3 (three) times a day Use to test blood sugar, Disp: , Rfl:     Accu-Chek Softclix Lancets lancets, 3 (three) times a day Use to test blood sugar, Disp: , Rfl:     acetaminophen (TYLENOL) 325 mg tablet, Take 2 tablets (650 mg total) by mouth every 6 (six) hours as needed for mild pain or fever, Disp: 30 tablet, Rfl: 0    albuterol (ProAir HFA) 90 mcg/act inhaler, Inhale 2 puffs every 6 (six) hours as needed for wheezing or shortness of breath, Disp: 8 5 g, Rfl: 0    ALPRAZolam (XANAX) 0 25 mg tablet, Take 0 25 mg by mouth 2 (two) times a day as needed for anxiety, Disp: , Rfl:     ALPRAZolam (XANAX) 0 5 mg tablet, TAKE 1/2 TO 1 TABLET BY MOUTH EVERY 4 HOURS AS NEEDED FOR ANXIETY , Disp: , Rfl:     atorvastatin (LIPITOR) 20 mg tablet, Take 20 mg by mouth every evening , Disp: , Rfl: 0    atropine (ISOPTO ATROPINE) 1 % ophthalmic solution, Administer 1 drop to both eyes daily at bedtime , Disp: , Rfl:     B Complex-C-Folic Acid (TRIPHROCAPS) 1 MG CAPS, Take 1 capsule (1 mg total) by mouth daily, Disp: 30 capsule, Rfl: 0    Banophen 50 MG capsule, , Disp: , Rfl:     brimonidine (ALPHAGAN P) 0 15 % ophthalmic solution, Administer 1 drop to both eyes 2 (two) times a day , Disp: , Rfl:     CALCIUM CARBONATE PO, Take 1,000 mg by mouth daily  , Disp: , Rfl:     carvedilol (COREG) 12 5 mg tablet, TAKE 1 TABLET BY MOUTH ONCE DAILY EXCEPT DIALYSIS DAYS , Disp: , Rfl:     carvedilol (COREG) 25 mg tablet, Take 25 mg by mouth 2 (two) times a day with meals  , Disp: , Rfl:     cinacalcet (SENSIPAR) 30 mg tablet, Take 30 mg by mouth daily, Disp: , Rfl:     diclofenac sodium (VOLTAREN) 1 %, Apply 2 g topically 4 (four) times a day, Disp: 200 g, Rfl: 0    Diclofenac Sodium (Voltaren) 1 %, Apply 2 g topically 4 (four) times a day, Disp: 100 g, Rfl: 1    diphenhydrAMINE (BENADRYL) 25 mg capsule, Take by mouth as needed for itching  , Disp: , Rfl:     dorzolamide-timolol (COSOPT) 22 3-6 8 MG/ML ophthalmic solution, instill 1 drop into both eyes twice a day, Disp: , Rfl: 0    ergocalciferol (VITAMIN D2) 50,000 units, take 1 capsule by mouth every week FOR 12 WEEKS, Disp: , Rfl:     gabapentin (NEURONTIN) 100 mg capsule, Take 3 capsules (300 mg total) by mouth daily at bedtime, Disp: , Rfl: 0    insulin glargine (LANTUS) 100 units/mL subcutaneous injection, Inject 30 Units under the skin daily at bedtime, Disp: , Rfl: 0    KIONEX 15 GM/60ML suspension, Take by mouth Takes as a shake on dialysis days Tues-Thurs_Sat, Disp: , Rfl: 0    latanoprost (XALATAN) 0 005 % ophthalmic solution, Administer 1 drop to both eyes daily at bedtime, Disp: , Rfl:     levothyroxine 50 mcg tablet, Take 50 mcg by mouth daily, Disp: , Rfl:     lidocaine (LIDODERM) 5 %, APPLY 1 PATCH BY TRANDERMAL ROUTE EVERY DAY (MAY WEAR UP TO 12 HOURS)  , Disp: , Rfl:     loratadine (CLARITIN) 10 mg tablet, Take 10 mg by mouth daily, Disp: , Rfl:     LORazepam (ATIVAN) 1 mg tablet, Take 1 tablet (1 mg total) by mouth once in imaging for anxiety for up to 1 dose, Disp: 1 tablet, Rfl: 0    losartan (COZAAR) 25 mg tablet, Take 1 tablet (25 mg total) by mouth daily Take on NON-Dialysis Days, Disp: , Rfl:     Melatonin 3 MG CAPS, Take 10 mg by mouth daily at bedtime  , Disp: , Rfl:     methazolamide (NEPTAZANE) 25 MG tablet, Take 25 mg by mouth 3 (three) times a day  , Disp: , Rfl:     methocarbamol (ROBAXIN) 500 mg tablet, Take 1 tablet (500 mg total) by mouth every 8 (eight) hours as needed for muscle spasms, Disp: 24 tablet, Rfl: 0    mupirocin (BACTROBAN) 2 % ointment, Apply topically 3 (three) times a day, Disp: 15 g, Rfl: 0    NOVOLOG FLEXPEN 100 units/mL SOPN, INJECT 1 TO 5 UNITS SUBCUTANEOUSLY THREE TIMES A DAY BEFORE MELAS AS PER SLIDING SCALE, Disp: , Rfl:     nystatin (MYCOSTATIN) powder, Apply topically 2 (two) times a day, Disp: 15 g, Rfl: 0    ondansetron (ZOFRAN) 4 mg tablet, Take 1 tablet (4 mg total) by mouth every 8 (eight) hours as needed for nausea or vomiting, Disp: 12 tablet, Rfl: 0    pantoprazole (PROTONIX) 40 mg tablet, Take 1 tablet (40 mg total) by mouth daily in the early morning, Disp: , Rfl: 0    Podiatric Products (Flexitol Heel Balm) OINT, Apply topically once for 1 dose, Disp: 112 g, Rfl: 2    prednisoLONE acetate (PRED FORTE) 1 % ophthalmic suspension, Administer 1 drop to both eyes 4 (four) times a day Patient takes only occasionally, Disp: , Rfl:     pregabalin (LYRICA) 25 mg capsule, Take 1 PO daily   Please take an additional capsule after dialysis, Disp: 40 capsule, Rfl: 1    senna (SENOKOT) 8 6 mg, Take 2 tablets (17 2 mg total) by mouth daily at bedtime as needed for constipation, Disp: 30 each, Rfl: 0    sertraline (ZOLOFT) 50 mg tablet, Take 1 tablet (50 mg total) by mouth daily, Disp: 30 tablet, Rfl: 0    sevelamer carbonate (RENVELA) 800 mg tablet, TAKE 3 TABLETS BY MOUTH THREE TIMES A DAY WITH MEALS AND 2 TABLETS WITH SNACKS , Disp: , Rfl:     TIMOLOL MALEATE OP, Apply 1 drop to eye 2 (two) times a day, Disp: , Rfl:     tobramycin-dexamethasone (TOBRADEX) ophthalmic suspension, Administer 1 drop into the left eye every 4 (four) hours while awake, Disp: 5 mL, Rfl: 0    torsemide (DEMADEX) 100 mg tablet, Take 100 mg by mouth every 12 (twelve) hours , Disp: , Rfl: 0    traMADol (ULTRAM) 50 mg tablet, Take 1 tablet (50 mg total) by mouth every 12 (twelve) hours as needed for moderate pain for up to 10 doses (Patient not taking: Reported on 5/6/2021), Disp: 10 tablet, Rfl: 0    warfarin (COUMADIN) 2 mg tablet, Take a 2 mg tablet in addition to a 10 mg tablet for a total of 12 mg daily until INR is therapeutic (Patient taking differently: 12 mg On Sunday ONLY), Disp: 7 tablet, Rfl: 0    warfarin (COUMADIN) 3 mg tablet, Take 9 mg by mouth daily Takes 9 mg Monday Sat, Disp: , Rfl:     Allergies   Allergen Reactions    Iodinated Diagnostic Agents Itching            Vitals:    05/19/21 1336   BP: 160/78   Pulse: 84       Objective:                    Right Knee Exam     Muscle Strength   The patient has normal right knee strength  Tenderness   The patient is experiencing tenderness in the lateral joint line  Range of Motion   The patient has normal right knee ROM  Right knee flexion: with crepitation  Other   Erythema: absent  Sensation: normal  Swelling: mild  Effusion: no effusion present      Left Knee Exam     Muscle Strength   The patient has normal left knee strength  Tenderness   The patient is experiencing tenderness in the lateral joint line  Range of Motion   The patient has normal left knee ROM  Left knee flexion: with crepitation       Other   Erythema: absent  Sensation: normal  Swelling: mild  Effusion: no effusion present    Comments: Both lower extremities are in valgus alignment             Diagnostics, reviewed and taken today if performed as documented:    None performed          Procedures, if performed today:    Large joint arthrocentesis: bilateral knee  Universal Protocol:  Consent: Verbal consent obtained  Risks and benefits: risks, benefits and alternatives were discussed  Consent given by: patient  Time out: Immediately prior to procedure a "time out" was called to verify the correct patient, procedure, equipment, support staff and site/side marked as required  Timeout called at: 5/19/2021 2:08 PM   Patient understanding: patient states understanding of the procedure being performed  Site marked: the operative site was marked  Patient identity confirmed: verbally with patient    Supporting Documentation  Indications: pain   Procedure Details  Location: knee - bilateral knee  Preparation: Patient was prepped and draped in the usual sterile fashion  Needle size: 22 G  Ultrasound guidance: no  Approach: anterolateral    Medications (Right): 2 mL bupivacaine 0 25 %; 2 mL lidocaine 1 %; 12 mg betamethasone acetate-betamethasone sodium phosphate 6 (3-3) mg/mLMedications (Left): 2 mL bupivacaine 0 25 %; 2 mL lidocaine 1 %; 12 mg betamethasone acetate-betamethasone sodium phosphate 6 (3-3) mg/mL   Patient tolerance: patient tolerated the procedure well with no immediate complications  Dressing:  Sterile dressing applied              Portions of the record may have been created with voice recognition software  Occasional wrong word or "sound a like" substitutions may have occurred due to the inherent limitations of voice recognition software  Read the chart carefully and recognize, using context, where substitutions have occurred

## 2021-06-08 ENCOUNTER — HOSPITAL ENCOUNTER (INPATIENT)
Facility: HOSPITAL | Age: 56
LOS: 3 days | Discharge: HOME | DRG: 640 | End: 2021-06-11
Attending: EMERGENCY MEDICINE | Admitting: INTERNAL MEDICINE
Payer: MEDICARE

## 2021-06-08 ENCOUNTER — HOSPITAL ENCOUNTER (EMERGENCY)
Dept: DIALYSIS | Facility: HOSPITAL | Age: 56
Discharge: HOME | DRG: 640 | End: 2021-06-08
Payer: MEDICARE

## 2021-06-08 ENCOUNTER — APPOINTMENT (EMERGENCY)
Dept: RADIOLOGY | Facility: HOSPITAL | Age: 56
DRG: 640 | End: 2021-06-08
Payer: MEDICARE

## 2021-06-08 VITALS
RESPIRATION RATE: 16 BRPM | TEMPERATURE: 97.6 F | SYSTOLIC BLOOD PRESSURE: 132 MMHG | DIASTOLIC BLOOD PRESSURE: 108 MMHG | HEART RATE: 74 BPM

## 2021-06-08 DIAGNOSIS — R07.9 CHEST PAIN: Primary | ICD-10-CM

## 2021-06-08 DIAGNOSIS — N18.6 ESRD ON HEMODIALYSIS (HCC): Chronic | ICD-10-CM

## 2021-06-08 DIAGNOSIS — Z86.718 HISTORY OF DVT (DEEP VEIN THROMBOSIS): ICD-10-CM

## 2021-06-08 DIAGNOSIS — E11.9 TYPE 2 DIABETES MELLITUS (HCC): ICD-10-CM

## 2021-06-08 DIAGNOSIS — E87.5 HYPERKALEMIA: ICD-10-CM

## 2021-06-08 DIAGNOSIS — R68.89 FLU-LIKE SYMPTOMS: ICD-10-CM

## 2021-06-08 DIAGNOSIS — Z99.2 ESRD ON HEMODIALYSIS (HCC): Chronic | ICD-10-CM

## 2021-06-08 LAB
ANION GAP SERPL CALCULATED.3IONS-SCNC: 4 MMOL/L (ref 4–13)
APTT PPP: 42 SECONDS (ref 23–37)
ATRIAL RATE: 74 BPM
BASOPHILS # BLD AUTO: 0.04 THOUSANDS/ΜL (ref 0–0.1)
BASOPHILS NFR BLD AUTO: 1 % (ref 0–1)
BUN SERPL-MCNC: 95 MG/DL (ref 5–25)
CALCIUM SERPL-MCNC: 8.3 MG/DL (ref 8.3–10.1)
CHLORIDE SERPL-SCNC: 104 MMOL/L (ref 100–108)
CO2 SERPL-SCNC: 26 MMOL/L (ref 21–32)
CREAT SERPL-MCNC: 10.3 MG/DL (ref 0.6–1.3)
EOSINOPHIL # BLD AUTO: 0.17 THOUSAND/ΜL (ref 0–0.61)
EOSINOPHIL NFR BLD AUTO: 3 % (ref 0–6)
ERYTHROCYTE [DISTWIDTH] IN BLOOD BY AUTOMATED COUNT: 13.2 % (ref 11.6–15.1)
GFR SERPL CREATININE-BSD FRML MDRD: 4 ML/MIN/1.73SQ M
GLUCOSE SERPL-MCNC: 133 MG/DL (ref 65–140)
GLUCOSE SERPL-MCNC: 221 MG/DL (ref 65–140)
GLUCOSE SERPL-MCNC: 230 MG/DL (ref 65–140)
GLUCOSE SERPL-MCNC: 330 MG/DL (ref 65–140)
GLUCOSE SERPL-MCNC: 94 MG/DL (ref 65–140)
HCT VFR BLD AUTO: 27.6 % (ref 34.8–46.1)
HGB BLD-MCNC: 9 G/DL (ref 11.5–15.4)
IMM GRANULOCYTES # BLD AUTO: 0.04 THOUSAND/UL (ref 0–0.2)
IMM GRANULOCYTES NFR BLD AUTO: 1 % (ref 0–2)
INR PPP: 2.92 (ref 0.84–1.19)
LYMPHOCYTES # BLD AUTO: 1.06 THOUSANDS/ΜL (ref 0.6–4.47)
LYMPHOCYTES NFR BLD AUTO: 16 % (ref 14–44)
MCH RBC QN AUTO: 31.5 PG (ref 26.8–34.3)
MCHC RBC AUTO-ENTMCNC: 32.6 G/DL (ref 31.4–37.4)
MCV RBC AUTO: 97 FL (ref 82–98)
MONOCYTES # BLD AUTO: 0.49 THOUSAND/ΜL (ref 0.17–1.22)
MONOCYTES NFR BLD AUTO: 7 % (ref 4–12)
NEUTROPHILS # BLD AUTO: 4.96 THOUSANDS/ΜL (ref 1.85–7.62)
NEUTS SEG NFR BLD AUTO: 72 % (ref 43–75)
NRBC BLD AUTO-RTO: 0 /100 WBCS
NT-PROBNP SERPL-MCNC: 7551 PG/ML
P AXIS: 33 DEGREES
PLATELET # BLD AUTO: 147 THOUSANDS/UL (ref 149–390)
PMV BLD AUTO: 11.1 FL (ref 8.9–12.7)
POTASSIUM SERPL-SCNC: 7.8 MMOL/L (ref 3.5–5.3)
PR INTERVAL: 190 MS
PROTHROMBIN TIME: 30.3 SECONDS (ref 11.6–14.5)
QRS AXIS: 64 DEGREES
QRSD INTERVAL: 124 MS
QT INTERVAL: 414 MS
QTC INTERVAL: 459 MS
RBC # BLD AUTO: 2.86 MILLION/UL (ref 3.81–5.12)
SARS-COV-2 RNA RESP QL NAA+PROBE: NEGATIVE
SODIUM SERPL-SCNC: 134 MMOL/L (ref 136–145)
T WAVE AXIS: 42 DEGREES
TROPONIN I SERPL-MCNC: <0.02 NG/ML
VENTRICULAR RATE: 74 BPM
WBC # BLD AUTO: 6.76 THOUSAND/UL (ref 4.31–10.16)

## 2021-06-08 PROCEDURE — 96375 TX/PRO/DX INJ NEW DRUG ADDON: CPT

## 2021-06-08 PROCEDURE — 82948 REAGENT STRIP/BLOOD GLUCOSE: CPT

## 2021-06-08 PROCEDURE — 85025 COMPLETE CBC W/AUTO DIFF WBC: CPT | Performed by: EMERGENCY MEDICINE

## 2021-06-08 PROCEDURE — U0003 INFECTIOUS AGENT DETECTION BY NUCLEIC ACID (DNA OR RNA); SEVERE ACUTE RESPIRATORY SYNDROME CORONAVIRUS 2 (SARS-COV-2) (CORONAVIRUS DISEASE [COVID-19]), AMPLIFIED PROBE TECHNIQUE, MAKING USE OF HIGH THROUGHPUT TECHNOLOGIES AS DESCRIBED BY CMS-2020-01-R: HCPCS | Performed by: EMERGENCY MEDICINE

## 2021-06-08 PROCEDURE — 87081 CULTURE SCREEN ONLY: CPT | Performed by: INTERNAL MEDICINE

## 2021-06-08 PROCEDURE — U0005 INFEC AGEN DETEC AMPLI PROBE: HCPCS | Performed by: EMERGENCY MEDICINE

## 2021-06-08 PROCEDURE — 94640 AIRWAY INHALATION TREATMENT: CPT

## 2021-06-08 PROCEDURE — G0257 UNSCHED DIALYSIS ESRD PT HOS: HCPCS

## 2021-06-08 PROCEDURE — 80048 BASIC METABOLIC PNL TOTAL CA: CPT | Performed by: EMERGENCY MEDICINE

## 2021-06-08 PROCEDURE — 99285 EMERGENCY DEPT VISIT HI MDM: CPT

## 2021-06-08 PROCEDURE — 85610 PROTHROMBIN TIME: CPT | Performed by: EMERGENCY MEDICINE

## 2021-06-08 PROCEDURE — NC001 PR NO CHARGE: Performed by: INTERNAL MEDICINE

## 2021-06-08 PROCEDURE — 96365 THER/PROPH/DIAG IV INF INIT: CPT

## 2021-06-08 PROCEDURE — 71275 CT ANGIOGRAPHY CHEST: CPT

## 2021-06-08 PROCEDURE — 99291 CRITICAL CARE FIRST HOUR: CPT | Performed by: EMERGENCY MEDICINE

## 2021-06-08 PROCEDURE — 99223 1ST HOSP IP/OBS HIGH 75: CPT | Performed by: INTERNAL MEDICINE

## 2021-06-08 PROCEDURE — 85730 THROMBOPLASTIN TIME PARTIAL: CPT | Performed by: EMERGENCY MEDICINE

## 2021-06-08 PROCEDURE — 84484 ASSAY OF TROPONIN QUANT: CPT | Performed by: EMERGENCY MEDICINE

## 2021-06-08 PROCEDURE — 84484 ASSAY OF TROPONIN QUANT: CPT | Performed by: INTERNAL MEDICINE

## 2021-06-08 PROCEDURE — 83880 ASSAY OF NATRIURETIC PEPTIDE: CPT | Performed by: EMERGENCY MEDICINE

## 2021-06-08 PROCEDURE — 99222 1ST HOSP IP/OBS MODERATE 55: CPT | Performed by: INTERNAL MEDICINE

## 2021-06-08 PROCEDURE — 36415 COLL VENOUS BLD VENIPUNCTURE: CPT | Performed by: EMERGENCY MEDICINE

## 2021-06-08 PROCEDURE — 93005 ELECTROCARDIOGRAM TRACING: CPT

## 2021-06-08 PROCEDURE — 90935 HEMODIALYSIS ONE EVALUATION: CPT | Performed by: INTERNAL MEDICINE

## 2021-06-08 PROCEDURE — 93010 ELECTROCARDIOGRAM REPORT: CPT | Performed by: INTERNAL MEDICINE

## 2021-06-08 RX ORDER — SEVELAMER HYDROCHLORIDE 800 MG/1
800 TABLET, FILM COATED ORAL
Status: DISCONTINUED | OUTPATIENT
Start: 2021-06-08 | End: 2021-06-11 | Stop reason: HOSPADM

## 2021-06-08 RX ORDER — TOBRAMYCIN AND DEXAMETHASONE 3; 1 MG/ML; MG/ML
1 SUSPENSION/ DROPS OPHTHALMIC
Status: DISCONTINUED | OUTPATIENT
Start: 2021-06-08 | End: 2021-06-08

## 2021-06-08 RX ORDER — CALCIUM GLUCONATE 20 MG/ML
2 INJECTION, SOLUTION INTRAVENOUS ONCE
Status: COMPLETED | OUTPATIENT
Start: 2021-06-08 | End: 2021-06-08

## 2021-06-08 RX ORDER — CARVEDILOL 25 MG/1
25 TABLET ORAL 2 TIMES DAILY WITH MEALS
Status: DISCONTINUED | OUTPATIENT
Start: 2021-06-08 | End: 2021-06-11

## 2021-06-08 RX ORDER — ONDANSETRON 4 MG/1
4 TABLET, ORALLY DISINTEGRATING ORAL EVERY 4 HOURS PRN
Status: DISCONTINUED | OUTPATIENT
Start: 2021-06-08 | End: 2021-06-11 | Stop reason: HOSPADM

## 2021-06-08 RX ORDER — ACETAMINOPHEN 325 MG/1
650 TABLET ORAL EVERY 6 HOURS PRN
Status: DISCONTINUED | OUTPATIENT
Start: 2021-06-08 | End: 2021-06-08

## 2021-06-08 RX ORDER — PANTOPRAZOLE SODIUM 40 MG/1
40 TABLET, DELAYED RELEASE ORAL
Status: DISCONTINUED | OUTPATIENT
Start: 2021-06-09 | End: 2021-06-11 | Stop reason: HOSPADM

## 2021-06-08 RX ORDER — INSULIN GLARGINE 100 [IU]/ML
30 INJECTION, SOLUTION SUBCUTANEOUS
Status: DISCONTINUED | OUTPATIENT
Start: 2021-06-08 | End: 2021-06-09

## 2021-06-08 RX ORDER — HYDROMORPHONE HCL IN WATER/PF 6 MG/30 ML
0.2 PATIENT CONTROLLED ANALGESIA SYRINGE INTRAVENOUS ONCE
Status: COMPLETED | OUTPATIENT
Start: 2021-06-08 | End: 2021-06-08

## 2021-06-08 RX ORDER — DIPHENHYDRAMINE HCL 25 MG
50 TABLET ORAL ONCE
Status: COMPLETED | OUTPATIENT
Start: 2021-06-08 | End: 2021-06-08

## 2021-06-08 RX ORDER — PREGABALIN 25 MG/1
25 CAPSULE ORAL DAILY
Status: DISCONTINUED | OUTPATIENT
Start: 2021-06-09 | End: 2021-06-08

## 2021-06-08 RX ORDER — LORATADINE 10 MG/1
10 TABLET ORAL DAILY
Status: DISCONTINUED | OUTPATIENT
Start: 2021-06-09 | End: 2021-06-11 | Stop reason: HOSPADM

## 2021-06-08 RX ORDER — WARFARIN SODIUM 3 MG/1
9 TABLET ORAL
Status: DISCONTINUED | OUTPATIENT
Start: 2021-06-08 | End: 2021-06-11 | Stop reason: HOSPADM

## 2021-06-08 RX ORDER — ACETAMINOPHEN 325 MG/1
650 TABLET ORAL EVERY 6 HOURS PRN
Status: DISCONTINUED | OUTPATIENT
Start: 2021-06-08 | End: 2021-06-11 | Stop reason: HOSPADM

## 2021-06-08 RX ORDER — TORSEMIDE 20 MG/1
100 TABLET ORAL EVERY 12 HOURS
Status: DISCONTINUED | OUTPATIENT
Start: 2021-06-08 | End: 2021-06-10

## 2021-06-08 RX ORDER — DEXTROSE MONOHYDRATE 25 G/50ML
25 INJECTION, SOLUTION INTRAVENOUS ONCE
Status: COMPLETED | OUTPATIENT
Start: 2021-06-08 | End: 2021-06-08

## 2021-06-08 RX ORDER — LATANOPROST 50 UG/ML
1 SOLUTION/ DROPS OPHTHALMIC
Status: DISCONTINUED | OUTPATIENT
Start: 2021-06-08 | End: 2021-06-11 | Stop reason: HOSPADM

## 2021-06-08 RX ORDER — GABAPENTIN 300 MG/1
300 CAPSULE ORAL
Status: DISCONTINUED | OUTPATIENT
Start: 2021-06-08 | End: 2021-06-11 | Stop reason: HOSPADM

## 2021-06-08 RX ORDER — DORZOLAMIDE HYDROCHLORIDE AND TIMOLOL MALEATE 20; 5 MG/ML; MG/ML
1 SOLUTION/ DROPS OPHTHALMIC 2 TIMES DAILY
Status: DISCONTINUED | OUTPATIENT
Start: 2021-06-08 | End: 2021-06-11 | Stop reason: HOSPADM

## 2021-06-08 RX ORDER — ATROPINE SULFATE 10 MG/ML
1 SOLUTION/ DROPS OPHTHALMIC
Status: DISCONTINUED | OUTPATIENT
Start: 2021-06-08 | End: 2021-06-11 | Stop reason: HOSPADM

## 2021-06-08 RX ORDER — ATORVASTATIN CALCIUM 20 MG/1
20 TABLET, FILM COATED ORAL EVERY EVENING
Status: DISCONTINUED | OUTPATIENT
Start: 2021-06-08 | End: 2021-06-11 | Stop reason: HOSPADM

## 2021-06-08 RX ORDER — ALBUTEROL SULFATE 90 UG/1
2 AEROSOL, METERED RESPIRATORY (INHALATION) EVERY 6 HOURS PRN
Status: DISCONTINUED | OUTPATIENT
Start: 2021-06-08 | End: 2021-06-11 | Stop reason: HOSPADM

## 2021-06-08 RX ORDER — SENNOSIDES 8.6 MG
2 TABLET ORAL
Status: DISCONTINUED | OUTPATIENT
Start: 2021-06-08 | End: 2021-06-11 | Stop reason: HOSPADM

## 2021-06-08 RX ORDER — LIDOCAINE 50 MG/G
1 PATCH TOPICAL DAILY
Status: DISCONTINUED | OUTPATIENT
Start: 2021-06-09 | End: 2021-06-11 | Stop reason: HOSPADM

## 2021-06-08 RX ORDER — ALPRAZOLAM 0.25 MG/1
0.25 TABLET ORAL 2 TIMES DAILY PRN
Status: DISCONTINUED | OUTPATIENT
Start: 2021-06-08 | End: 2021-06-11 | Stop reason: HOSPADM

## 2021-06-08 RX ORDER — LEVOTHYROXINE SODIUM 0.05 MG/1
50 TABLET ORAL DAILY
Status: DISCONTINUED | OUTPATIENT
Start: 2021-06-09 | End: 2021-06-11 | Stop reason: HOSPADM

## 2021-06-08 RX ORDER — BRIMONIDINE TARTRATE 0.15 %
1 DROPS OPHTHALMIC (EYE) 2 TIMES DAILY
Status: DISCONTINUED | OUTPATIENT
Start: 2021-06-08 | End: 2021-06-11 | Stop reason: HOSPADM

## 2021-06-08 RX ORDER — LORAZEPAM 0.5 MG/1
1 TABLET ORAL
Status: DISCONTINUED | OUTPATIENT
Start: 2021-06-08 | End: 2021-06-11 | Stop reason: HOSPADM

## 2021-06-08 RX ORDER — PREDNISOLONE ACETATE 10 MG/ML
1 SUSPENSION/ DROPS OPHTHALMIC 4 TIMES DAILY
Status: DISCONTINUED | OUTPATIENT
Start: 2021-06-08 | End: 2021-06-11 | Stop reason: HOSPADM

## 2021-06-08 RX ORDER — HYDROMORPHONE HCL/PF 1 MG/ML
0.5 SYRINGE (ML) INJECTION ONCE
Status: DISCONTINUED | OUTPATIENT
Start: 2021-06-08 | End: 2021-06-08

## 2021-06-08 RX ORDER — CINACALCET 30 MG/1
30 TABLET, FILM COATED ORAL DAILY
Status: DISCONTINUED | OUTPATIENT
Start: 2021-06-09 | End: 2021-06-11 | Stop reason: HOSPADM

## 2021-06-08 RX ORDER — NYSTATIN 100000 [USP'U]/G
POWDER TOPICAL 2 TIMES DAILY
Status: DISCONTINUED | OUTPATIENT
Start: 2021-06-08 | End: 2021-06-11 | Stop reason: HOSPADM

## 2021-06-08 RX ADMIN — ATORVASTATIN CALCIUM 20 MG: 20 TABLET, FILM COATED ORAL at 17:20

## 2021-06-08 RX ADMIN — ACETAMINOPHEN 650 MG: 325 TABLET, FILM COATED ORAL at 13:38

## 2021-06-08 RX ADMIN — GABAPENTIN 300 MG: 300 CAPSULE ORAL at 21:56

## 2021-06-08 RX ADMIN — BRIMONIDINE TARTRATE 1 DROP: 1.5 SOLUTION OPHTHALMIC at 18:21

## 2021-06-08 RX ADMIN — DORZOLAMIDE HYDROCHLORIDE AND TIMOLOL MALEATE 1 DROP: 20; 5 SOLUTION/ DROPS OPHTHALMIC at 18:22

## 2021-06-08 RX ADMIN — ALBUTEROL SULFATE 10 MG: 2.5 SOLUTION RESPIRATORY (INHALATION) at 09:59

## 2021-06-08 RX ADMIN — PREDNISOLONE ACETATE 1 DROP: 10 SUSPENSION/ DROPS OPHTHALMIC at 21:57

## 2021-06-08 RX ADMIN — LATANOPROST 1 DROP: 50 SOLUTION OPHTHALMIC at 21:57

## 2021-06-08 RX ADMIN — NYSTATIN 1 APPLICATION: 100000 POWDER TOPICAL at 18:22

## 2021-06-08 RX ADMIN — INSULIN LISPRO 3 UNITS: 100 INJECTION, SOLUTION INTRAVENOUS; SUBCUTANEOUS at 17:20

## 2021-06-08 RX ADMIN — INSULIN HUMAN 10 UNITS: 100 INJECTION, SOLUTION PARENTERAL at 10:01

## 2021-06-08 RX ADMIN — CARVEDILOL 25 MG: 25 TABLET, FILM COATED ORAL at 17:20

## 2021-06-08 RX ADMIN — WARFARIN SODIUM 9 MG: 3 TABLET ORAL at 18:20

## 2021-06-08 RX ADMIN — TORSEMIDE 100 MG: 20 TABLET ORAL at 17:20

## 2021-06-08 RX ADMIN — SEVELAMER HYDROCHLORIDE 800 MG: 800 TABLET, FILM COATED PARENTERAL at 18:19

## 2021-06-08 RX ADMIN — HYDROMORPHONE HYDROCHLORIDE 0.2 MG: 0.2 INJECTION, SOLUTION INTRAMUSCULAR; INTRAVENOUS; SUBCUTANEOUS at 08:26

## 2021-06-08 RX ADMIN — IODIXANOL 100 ML: 320 INJECTION, SOLUTION INTRAVASCULAR at 10:17

## 2021-06-08 RX ADMIN — DEXTROSE MONOHYDRATE 25 ML: 500 INJECTION PARENTERAL at 09:53

## 2021-06-08 RX ADMIN — DIPHENHYDRAMINE HCL 50 MG: 25 TABLET ORAL at 08:28

## 2021-06-08 RX ADMIN — ACETAMINOPHEN 650 MG: 325 TABLET, FILM COATED ORAL at 22:01

## 2021-06-08 RX ADMIN — INSULIN GLARGINE 30 UNITS: 100 INJECTION, SOLUTION SUBCUTANEOUS at 21:56

## 2021-06-08 RX ADMIN — CALCIUM GLUCONATE 2 G: 20 INJECTION, SOLUTION INTRAVENOUS at 08:48

## 2021-06-08 RX ADMIN — ATROPINE SULFATE 1 DROP: 10 SOLUTION/ DROPS OPHTHALMIC at 21:57

## 2021-06-08 RX ADMIN — SODIUM BICARBONATE 50 MEQ: 84 INJECTION INTRAVENOUS at 09:55

## 2021-06-08 RX ADMIN — PREDNISOLONE ACETATE 1 DROP: 10 SUSPENSION/ DROPS OPHTHALMIC at 18:21

## 2021-06-08 NOTE — H&P
[address] [facility]  H&P- Farrukh Banks 1965, 49 y o  female MRN: [id number]  Unit/Bed#: ED 09 Encounter: [id number]  Primary Care Provider: Milind Hutchinson MD   Date and time admitted to hospital: 6/8/2021  7:07 AM    * Chest pain  Assessment & Plan  Likely secondary to fluid overload  Patient sent for urgent dialysis  Serial cardiac enzymes  Monitor symptoms post dialysis treatment  Correct electrolytes    Hyperkalemia  Assessment & Plan  Placed on telemetry  Urgent dialysis is initiated  Follow up repeat labs  Nephrology input noted    Type 2 diabetes mellitus [facility])  Assessment & Plan  Lab Results   Component Value Date    HGBA1C 8 6 (H) 02/20/2020       Recent Labs     06/08/21  0917 06/08/21  1400   POCGLU 221* 94       Blood Sugar Average: Last 72 hrs:  (P) 157 5     Sliding scale insulin    ESRD on hemodialysis [facility])  Assessment & Plan  Lab Results   Component Value Date    EGFR 4 06/08/2021    EGFR 7 08/25/2020    EGFR 6 05/20/2020    CREATININE 10 30 (H) 06/08/2021    CREATININE 6 15 (H) 08/25/2020    CREATININE 7 06 (H) 05/20/2020   Patient with poor compliance with diet  Note Nephrology input  Currently on dialysis  VTE Prophylaxis: Warfarin (Coumadin)  / sequential compression device   Code Status:  Full code  POLST: There is no POLST form on file for this patient (pre-hospital)      Anticipated Length of Stay:  Patient will be admitted on an Inpatient basis with an anticipated length of stay of  greater 2 midnights  Justification for Hospital Stay:  Need to monitor symptoms with presentation fluid overload status in the setting of end-stage renal disease    Total Time for Visit, including Counseling / Coordination of Care: 45 minutes  Greater than 50% of this total time spent on direct patient counseling and coordination of care      Chief Complaint:   Chest pain    History of Present Illness:    Farrukh Banks is a 49 y o  female who presents with chest discomfort  Patient has known history of end-stage renal disease on hemodialysis which she receives treatments for Tuesday Thursday and Saturday  Her last treatment was this past Saturday  She presents with a sensation of chest pressure and shortness of breath was found to have a potassium 7 8 on presentation  She was also noted to be profoundly fluid overload on presentation to ED prompting urgent dialysis  Patient currently is in the dialysis room  Patient reports that she thinks she has approximately 5 kg over her dry weight with dialysis  Review of Systems:    Review of Systems   Constitutional: Negative for diaphoresis and fatigue  HENT: Negative for hearing loss  Respiratory: Positive for chest tightness and shortness of breath  Negative for apnea, cough and choking  Cardiovascular: Negative for chest pain and leg swelling  Gastrointestinal: Negative for abdominal distention  Musculoskeletal: Negative for arthralgias and back pain  Neurological: Negative for dizziness, facial asymmetry and numbness  Psychiatric/Behavioral: Negative for agitation and confusion  All other systems reviewed and are negative        Past Medical and Surgical History:     Past Medical History:   Diagnosis Date    Abnormal uterine bleeding (AUB)     Anxiety     Arthritis     Chronic kidney disease     Claustrophobia     Diabetes mellitus ([facility] [address] [facility]     Disease of thyroid gland     DVT (deep venous thrombosis) (HCC)     End stage kidney disease (HCC)     Foot ulcer due to secondary DM ([facility] [address] )     Hemodialysis patient ([facility] )     Tues, Thurs, Sat    Hypertension     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[facility] [address] )     right eye    Morbid obesity (HCC)     Osteomyelitis of fifth toe of right foot (HCC)     Panic attacks     Pulmonary embolism (HCC)     Reflux esophagitis     Thrombophlebitis of saphenous vein     Warfarin anticoagulation        Past Surgical History:   Procedure Laterality Date    AMPUTATION      r 4th toe    ARTERIOVENOUS GRAFT PLACEMENT      CATARACT EXTRACTION Bilateral     CERVICAL BIOPSY  W/ LOOP ELECTRODE EXCISION       SECTION      DIALYSIS FISTULA CREATION      DILATION AND CURETTAGE OF UTERUS      ENDOMETRIAL ABLATION W/ NOVASURE      EYE SURGERY      HYSTERECTOMY      @ age 57 (complete)    IR AV FISTULAGRAM/GRAFTOGRAM  10/11/2019    IR AV FISTULAGRAM/GRAFTOGRAM  2020    IR AV FISTULAGRAM/GRAFTOGRAM  2020    OOPHORECTOMY Bilateral     @ age 57    PERICARDIUM SURGERY      ID AMPUTATION TOE,MT-P JT Right 2020    Procedure: AMPUTATION TOE- 5th;  Surgeon: Marycruz Ritter DPM;  [facility];  Service: Podiatry    ID COLONOSCOPY FLX DX W/COLLJ SPEC WHEN PFRMD N/A 3/13/2019    Procedure: COLONOSCOPY;  Surgeon: Landon Montanez MD;  [facility]; Service: Gastroenterology    ID ESOPHAGOGASTRODUODENOSCOPY TRANSORAL DIAGNOSTIC N/A 3/13/2019    Procedure: ESOPHAGOGASTRODUODENOSCOPY (EGD); Surgeon: Landon Montanez MD;  [facility]: [facility]; Service: Gastroenterology    ID LAPAROSCOPY W TOT HYSTERECT UTERUS 250 GRAM OR LESS N/A 2016    Procedure: HYSTERECTOMY LAPAROSCOPIC TOTAL [facility]), with bilateral salpingectomy;  Surgeon: Kingston Goncalvse DO;  [facility];  Service: Gynecology    THROMBECTOMY / ARTERIOVENOUS GRAFT REVISION      TOE SURGERY Right     removal of the 4th toe       Meds/Allergies:    Prior to Admission medications    Medication Sig Start Date End Date Taking?  Authorizing Provider   Accu-Chek Esther Plus test strip 3 (three) times a day Use to test blood sugar 20   Historical Provider, MD   Accu-Chek Softclix Lancets lancets 3 (three) times a day Use to test blood sugar 20   Historical Provider, MD   acetaminophen (TYLENOL) 325 mg tablet Take 2 tablets (650 mg total) by mouth every 6 (six) hours as needed for mild pain or fever 20   Jass Salmon MD   albuterol (ProAir HFA) 90 mcg/act inhaler Inhale 2 puffs every 6 (six) hours as needed for wheezing or shortness of breath 2/21/20   LOIS Fernandez   ALPRAZolam Forestine Guitar) 0 25 mg tablet Take 0 25 mg by mouth 2 (two) times a day as needed for anxiety    Historical Provider, MD   ALPRAZolam (XANAX) 0 5 mg tablet TAKE 1/2 TO 1 TABLET BY MOUTH EVERY 4 HOURS AS NEEDED FOR ANXIETY  9/28/20   Historical Provider, MD   atorvastatin (LIPITOR) 20 mg tablet Take 20 mg by mouth every evening  4/24/18   Historical Provider, MD   atropine (ISOPTO ATROPINE) 1 % ophthalmic solution Administer 1 drop to both eyes daily at bedtime  2/4/19   Historical Provider, MD   B Complex-C-Folic Acid (TRIPHROCAPS) 1 MG CAPS Take 1 capsule (1 mg total) by mouth daily 4/28/19   LOIS Okeefe   Banophen 50 MG capsule  10/31/20   Historical Provider, MD   brimonidine (ALPHAGAN P) 0 15 % ophthalmic solution Administer 1 drop to both eyes 2 (two) times a day     Historical Provider, MD   CALCIUM CARBONATE PO Take 1,000 mg by mouth daily  Historical Provider, MD   carvedilol (COREG) 12 5 mg tablet TAKE 1 TABLET BY MOUTH ONCE DAILY EXCEPT DIALYSIS DAYS   10/16/20   Historical Provider, MD   carvedilol (COREG) 25 mg tablet Take 25 mg by mouth 2 (two) times a day with meals      Historical Provider, MD   cinacalcet (SENSIPAR) 30 mg tablet Take 30 mg by mouth daily    Historical Provider, MD   diclofenac sodium (VOLTAREN) 1 % Apply 2 g topically 4 (four) times a day 5/19/19   Lindy Middleton MD   Diclofenac Sodium (Voltaren) 1 % Apply 2 g topically 4 (four) times a day 4/21/21   Joann Oropeza DPM   diphenhydrAMINE (BENADRYL) 25 mg capsule Take by mouth as needed for itching      Historical Provider, MD   dorzolamide-timolol (COSOPT) 22 3-6 8 MG/ML ophthalmic solution instill 1 drop into both eyes twice a day 2/1/19   Historical Provider, MD   ergocalciferol (VITAMIN D2) 50,000 units take 1 capsule by mouth every week FOR 12 WEEKS 12/1/20   Historical Provider, MD   gabapentin (NEURONTIN) 100 mg capsule Take 3 capsules (300 mg total) by mouth daily at bedtime 2/21/20   LOIS Evans   insulin glargine (LANTUS) 100 units/mL subcutaneous injection Inject 30 Units under the skin daily at bedtime 8/31/19   MD Beau Mckeon Sovereign 15 GM/60ML suspension Take by mouth Takes as a shake on dialysis days Tues-Thurs_Sat 12/10/18   Historical Provider, MD   latanoprost (XALATAN) 0 005 % ophthalmic solution Administer 1 drop to both eyes daily at bedtime    Historical Provider, MD   levothyroxine 50 mcg tablet Take 50 mcg by mouth daily    Historical Provider, MD   lidocaine (LIDODERM) 5 % APPLY 1 PATCH BY TRANDERMAL ROUTE EVERY DAY (MAY WEAR UP TO 12 HOURS)   9/15/20   Historical Provider, MD   loratadine (CLARITIN) 10 mg tablet Take 10 mg by mouth daily    Historical Provider, MD   LORazepam (ATIVAN) 1 mg tablet Take 1 tablet (1 mg total) by mouth once in imaging for anxiety for up to 1 dose 3/31/20   Brielle Mckay PA-C   losartan (COZAAR) 25 mg tablet Take 1 tablet (25 mg total) by mouth daily Take on NON-Dialysis Days 5/23/18   Bonnie Parrish PA-C   Melatonin 3 MG CAPS Take 10 mg by mouth daily at bedtime      Historical Provider, MD   methazolamide (NEPTAZANE) 25 MG tablet Take 25 mg by mouth 3 (three) times a day      Historical Provider, MD   methocarbamol (ROBAXIN) 500 mg tablet Take 1 tablet (500 mg total) by mouth every 8 (eight) hours as needed for muscle spasms 10/20/20   Gilford Clerk, DO   mupirocin (BACTROBAN) 2 % ointment Apply topically 3 (three) times a day 10/20/20   Gilford Clerk, DO   NOVOLOG FLEXPEN 100 units/mL SOPN INJECT 1 TO 5 UNITS SUBCUTANEOUSLY THREE TIMES A DAY BEFORE MELAS AS PER SLIDING SCALE 4/30/20   Historical Provider, MD   nystatin (MYCOSTATIN) powder Apply topically 2 (two) times a day 8/31/19   Radha Pierre MD   ondansetron (ZOFRAN) 4 mg tablet Take 1 tablet (4 mg total) by mouth every 8 (eight) hours as needed for nausea or vomiting 1/22/19   Bianca Medrano MD   pantoprazole (PROTONIX) 40 mg tablet Take 1 tablet (40 mg total) by mouth daily in the early morning 2/21/20   Susanne Bermudez, 12 [facility]) OINT Apply topically once for 1 dose 3/31/21 3/31/21  Duke Garcia DPM   prednisoLONE acetate (PRED FORTE) 1 % ophthalmic suspension Administer 1 drop to both eyes 4 (four) times a day Patient takes only occasionally    Historical Provider, MD   pregabalin (LYRICA) 25 mg capsule Take 1 PO daily   Please take an additional capsule after dialysis 11/25/20   LOIS Hale   senna (SENOKOT) 8 6 mg Take 2 tablets (17 2 mg total) by mouth daily at bedtime as needed for constipation 2/21/20   LOIS Nava   sertraline (ZOLOFT) 50 mg tablet Take 1 tablet (50 mg total) by mouth daily 4/28/19   LOIS Okeefe   sevelamer carbonate (RENVELA) 800 mg tablet TAKE 3 TABLETS BY MOUTH THREE TIMES A DAY WITH MEALS AND 2 TABLETS WITH SNACKS  4/9/20   Historical Provider, MD   TIMOLOL MALEATE OP Apply 1 drop to eye 2 (two) times a day    Historical Provider, MD   tobramycin-dexamethasone (TOBRADEX) ophthalmic suspension Administer 1 drop into the left eye every 4 (four) hours while awake 8/25/20   Norris Claudio MD   torsemide [facility]) 100 mg tablet Take 100 mg by mouth every 12 (twelve) hours  1/2/19   Historical Provider, MD   traMADol (ULTRAM) 50 mg tablet Take 1 tablet (50 mg total) by mouth every 12 (twelve) hours as needed for moderate pain for up to 10 doses  Patient not taking: Reported on 5/6/2021 2/21/20   LOIS Nava   warfarin (COUMADIN) 2 mg tablet Take a 2 mg tablet in addition to a 10 mg tablet for a total of 12 mg daily until INR is therapeutic  Patient taking differently: 12 mg On Sunday ONLY 2/21/20   LOIS Nava   warfarin (COUMADIN) 3 mg tablet Take 9 mg by mouth daily Takes 9 mg Monday Sat    Historical Provider, MD HAMMOND have reviewed home medications with patient personally  Allergies: Allergies   Allergen Reactions    Iodinated Diagnostic Agents Itching       Social History:     Marital Status: Single     Patient Pre-hospital Living Situation:  Home  Patient Pre-hospital Level of Mobility:  Ambulatory  Patient Pre-hospital Diet Restrictions:  Denies  Substance Use History:   Social History     Substance and Sexual Activity   Alcohol Use Never    Alcohol/week: 0 0 standard drinks    Frequency: Never    Drinks per session: Patient refused    Binge frequency: Patient refused     Social History     Tobacco Use   Smoking Status Never Smoker   Smokeless Tobacco Never Used     Social History     Substance and Sexual Activity   Drug Use No       Family History:    Family History   Problem Relation Age of Onset    Heart disease Family     Diabetes Family     Heart disease Mother     Cancer Brother         neck    Throat cancer Brother     Ovarian cancer Maternal Aunt 40       Physical Exam:     Vitals:   Blood Pressure: (!) 180/78 (06/08/21 0945)  Pulse: 78 (06/08/21 0945)  Temperature: 97 6 °F (36 4 °C) (06/08/21 0734)  Temp Source: Oral (06/08/21 0734)  Respirations: 15 (06/08/21 0945)  Weight - Scale: 127 kg (279 lb) (06/08/21 0734)  SpO2: 99 % (06/08/21 0945)    Physical Exam  Constitutional:       Appearance: Normal appearance  HENT:      Head: Normocephalic and atraumatic  Cardiovascular:      Rate and Rhythm: Normal rate and regular rhythm  Pulmonary:      Effort: Pulmonary effort is normal       Breath sounds: Normal breath sounds  Abdominal:      General: Abdomen is flat  Palpations: Abdomen is soft  Musculoskeletal: Normal range of motion  General: No swelling  Skin:     General: Skin is warm and dry  Neurological:      General: No focal deficit present  Mental Status: She is alert and oriented to person, place, and time  Additional Data:     Lab Results: I have personally reviewed pertinent reports  Results from last 7 days   Lab Units 06/08/21  0759   WBC Thousand/uL 6 76   HEMOGLOBIN g/dL 9 0*   HEMATOCRIT % 27 6*   PLATELETS Thousands/uL 147*   NEUTROS PCT % 72   LYMPHS PCT % 16   MONOS PCT % 7   EOS PCT % 3     Results from last 7 days   Lab Units 06/08/21  0759   SODIUM mmol/L 134*   POTASSIUM mmol/L 7 8*   CHLORIDE mmol/L 104   CO2 mmol/L 26   BUN mg/dL 95*   CREATININE mg/dL 10 30*   ANION GAP mmol/L 4   CALCIUM mg/dL 8 3   GLUCOSE RANDOM mg/dL 230*     Results from last 7 days   Lab Units 06/08/21  0759   INR  2 92*     Results from last 7 days   Lab Units 06/08/21  1400 06/08/21  0917   POC GLUCOSE mg/dl 94 221*               Imaging: I have personally reviewed pertinent reports  CTA ED chest PE Study   Final Result by Saman Paulson DO (06/08 1106)      Evaluation of the pulmonary arteries somewhat limited due to respiratory motion  No acute pulmonary embolus identified  No acute abnormality identified  Workstation performed: [id number]                 ** Please Note: This note has been constructed using a voice recognition system   **

## 2021-06-08 NOTE — ASSESSMENT & PLAN NOTE
Lab Results   Component Value Date    HGBA1C 8 6 (H) 02/20/2020       Recent Labs     06/08/21  0917 06/08/21  1400   POCGLU 221* 94       Blood Sugar Average: Last 72 hrs:  (P) 157 5     Sliding scale insulin

## 2021-06-08 NOTE — ASSESSMENT & PLAN NOTE
Likely secondary to fluid overload  Patient sent for urgent dialysis  Serial cardiac enzymes  Monitor symptoms post dialysis treatment    Correct electrolytes

## 2021-06-08 NOTE — PROGRESS NOTES
HEMODIALYSIS PROCEDURE NOTE  The patient was seen and examined on hemodialysis  Time: 4 hours  Sodium: 138 Blood flow: 400   Dialyzer: F160 Potassium: 2K 2 5 hours 1K 1 5 hours Dialysate flow: 600   Access: AV fistula Bicarbonate: 35 Ultrafiltration goal: 4L as tolerated   Medications on HD: none     Patient was seen on hemodialysis approximately 1:22 p m   Tolerating treatment BP slightly lower UF goal  decreased

## 2021-06-08 NOTE — CONSULTS
Consultation - Nephrology   Mellissa Stein 49 y o  female MRN: [id number]  Unit/Bed#: ED 09 Encounter: [id number]    ASSESSMENT/PLAN:   1  ESRD on HD: on HD TTS at [facility] [address]   · Will dialyze urgently now due to hyperkalemia   ·  5kg and states she was 129 5kg at HD this morning   · Goal 4L UF   · Re-evaluate for extra HD tomorrow due to hyperkalemia and volume overload   2  Hyperkalemia: K 7 8 on admission  Signed off treatment early on Saturday (only had 2 hours 51 minutes) and eating some high K foods (apricots)   · HD today 2K bath 2 5 hours, 1K bath last 1 5 hours   · K treated medically in ER with calcium gluconate, insulin/D50, albuterol and sodium bicarb  · Low-potassium diet  · Will d/c losartan   3  Chest pain:  Likely due to hyperkalemia  Did have a CTA due to history of PE but no acute PE was seen  4  Volume overload:  Attempt 4 L UF with dialysis today  needs better compliance with fluid and sodium restriction  5  Hypertension:  Monitor blood pressure closely with dialysis as her blood pressure often drops with volume removal  6  Anemia of CKD:  Hemoglobin 9    · Not on MITCHELL with history of PE  7  Mineral bone disease of CKD:  Continue Hectorol 2 mcg with dialysis, Sensipar 30 mg daily, Renvela 3 tablets with meals and 2 with snacks  8  Access: LUE AVF working well     HISTORY OF PRESENT ILLNESS:  Requesting Physician: Toya Rivera MD  Reason for Consult: ESRD on HD     Mellissa Stein is a 49y o  year old female who was admitted to [facility] with chest pain  Patient states this started yesterday  She has ESRD on hemodialysis Tuesday, Thursday and Saturday  She went to her dialysis treatment today complaining of chest pressure and shortness of breath she was sent to the emergency room  In the ER her potassium was found to be 7 8  She ate 1 apricot recently but otherwise denies any high K foods  She had HD on Saturday but says she signed off treatment 30 minutes early  Currently reports not feeling well with a lot of chest tightness  No current shortness of breath, nausea vomiting or diarrhea  Thinks that she was 5 kgs over her dry weight at outpatient dialysis this morning  PAST MEDICAL HISTORY:  Past Medical History:   Diagnosis Date    Abnormal uterine bleeding (AUB)     Anxiety     Arthritis     Chronic kidney disease     Claustrophobia     [facility] mellitus [facility] [address] [facility]     Disease of thyroid gland     DVT (deep venous thrombosis) (HCC)     End stage kidney disease (HCC)     Foot ulcer due to secondary DM ([facility] [address] )     Hemodialysis patient ([facility] [address] )     Tues, Thurs, Sat    Hypertension     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[facility] [address] )     right eye    Morbid obesity ([facility] [address] )     Osteomyelitis of fifth toe of right foot ([facility] [address] )     Panic attacks     Pulmonary embolism (HCC)     Reflux esophagitis     Thrombophlebitis of saphenous vein     Warfarin anticoagulation        PAST SURGICAL HISTORY:  Past Surgical History:   Procedure Laterality Date    AMPUTATION      r 4th toe    ARTERIOVENOUS GRAFT PLACEMENT      CATARACT EXTRACTION Bilateral     CERVICAL BIOPSY  W/ LOOP ELECTRODE EXCISION       SECTION      DIALYSIS FISTULA CREATION      DILATION AND CURETTAGE OF UTERUS      ENDOMETRIAL ABLATION W/ NOVASURE      EYE SURGERY      HYSTERECTOMY      @ age 57 (complete)    IR AV FISTULAGRAM/GRAFTOGRAM  10/11/2019    IR AV FISTULAGRAM/GRAFTOGRAM  2020    IR AV FISTULAGRAM/GRAFTOGRAM  2020    OOPHORECTOMY Bilateral     @ age 57    PERICARDIUM SURGERY      HI AMPUTATION TOE,MT-P JT Right 2020    Procedure: AMPUTATION TOE- 5th;  Surgeon: Tesfaye Guerra DPM;  [facility];  Service: Podiatry    HI COLONOSCOPY FLX DX W/COLLJ SPEC WHEN PFRMD N/A 3/13/2019    Procedure: COLONOSCOPY;  Surgeon: Kaila Sabillon MD;  [facility];   Service: Gastroenterology    HI ESOPHAGOGASTRODUODENOSCOPY TRANSORAL DIAGNOSTIC N/A 3/13/2019 Procedure: ESOPHAGOGASTRODUODENOSCOPY (EGD); Surgeon: Nkechi Lowe MD;  Location: [facility]; Service: Gastroenterology    UT LAPAROSCOPY W TOT HYSTERECT UTERUS 250 GRAM OR LESS N/A 2/16/2016    Procedure: HYSTERECTOMY LAPAROSCOPIC TOTAL ([address]), with bilateral salpingectomy;  Surgeon: Cielo Lipscomb DO;  [facility];  Service: Gynecology    THROMBECTOMY / ARTERIOVENOUS GRAFT REVISION      TOE SURGERY Right     removal of the 4th toe       ALLERGIES:  Allergies   Allergen Reactions    Iodinated Diagnostic Agents Itching       SOCIAL HISTORY:  Social History     Substance and Sexual Activity   Alcohol Use Never    Alcohol/week: 0 0 standard drinks    Frequency: Never    Drinks per session: Patient refused    Binge frequency: Patient refused     Social History     Substance and Sexual Activity   Drug Use No     Social History     Tobacco Use   Smoking Status Never Smoker   Smokeless Tobacco Never Used       FAMILY HISTORY:  Family History   Problem Relation Age of Onset    Heart disease Family     Diabetes Family     Heart disease Mother     Cancer Brother         neck    Throat cancer Brother     Ovarian cancer Maternal Aunt 40       MEDICATIONS:  Scheduled Meds:    PRN Meds:     Continuous Infusions:No current facility-administered medications for this encounter  REVIEW OF SYSTEMS:  A complete review of systems was done  Pertinent positives and negatives noted in the HPI but otherwise the review of systems is negative      PHYSICAL EXAM:  Current Weight: Weight - Scale: 127 kg (279 lb)  First Weight: Weight - Scale: 127 kg (279 lb)  Vitals:    06/08/21 0945   BP: (!) 180/78   Pulse: 78   Resp: 15   Temp:    SpO2: 99%     General:  No acute distress  Skin:  No rash  Eyes:  Sclerae anicteric  ENT:  Moist mucous membranes  Neck:  Trachea midline  Chest: decreased breath sounds bilaterally  CVS:  Diminished heart tones   Abdomen:  Soft, nontender, obese  Extremities:  +1 LE edema  Neuro: Awake and alert  Psych:  Appropriate affect      Lab Results:   Results from last 7 days   Lab Units 06/08/21  0759   WBC Thousand/uL 6 76   HEMOGLOBIN g/dL 9 0*   HEMATOCRIT % 27 6*   PLATELETS Thousands/uL 147*   SODIUM mmol/L 134*   POTASSIUM mmol/L 7 8*   CHLORIDE mmol/L 104   CO2 mmol/L 26   BUN mg/dL 95*   CREATININE mg/dL 10 30*   CALCIUM mg/dL 8 3       Radiology Results:   CTA ED chest PE Study    (Results Pending)

## 2021-06-08 NOTE — PLAN OF CARE
Post-Dialysis RN Treatment Note    Blood Pressure:  Pre 179/136 mm/Hg  Post 132/108 mmHg   EDW  124 5 kg    Weight:  Pre 129 5 kg   Post unable to obtain kg   Mode of weight measurement: N/A   Volume Removed  3300 ml    Treatment duration 240 minutes    NS given  No    Treatment shortened? No   Medications given during Rx Not Applicable   Estimated Kt/V  1 34   Access type: AV fistula   Access Status: No    Report called to primary nurse   Yes     4 hour emergent hemodialysis session planned for today with ultrafiltration goal of 4 liters to address hyperkalemia and optimize fluid and electrolyte balance     Problem: METABOLIC, FLUID AND ELECTROLYTES - ADULT  Goal: Electrolytes maintained within normal limits  Description: INTERVENTIONS:  - Monitor labs and assess patient for signs and symptoms of electrolyte imbalances  - Administer electrolyte replacement as ordered  - Monitor response to electrolyte replacements, including repeat lab results as appropriate  - Instruct patient on fluid and nutrition as appropriate  Outcome: Progressing  Goal: Fluid balance maintained  Description: INTERVENTIONS:  - Monitor labs   - Monitor I/O and WT  - Instruct patient on fluid and nutrition as appropriate  - Assess for signs & symptoms of volume excess or deficit  Outcome: Progressing

## 2021-06-08 NOTE — ED ATTENDING ATTESTATION
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   ALPRAZolam (XANAX) 0 5 mg tablet   Yes No   Sig: TAKE 1/2 TO 1 TABLET BY MOUTH EVERY 4 HOURS AS NEEDED FOR ANXIETY  Accu-Chek Esther Plus test strip   Yes No   Sig: 3 (three) times a day Use to test blood sugar   Accu-Chek Softclix Lancets lancets   Yes No   Sig: 3 (three) times a day Use to test blood sugar   B Complex-C-Folic Acid (TRIPHROCAPS) 1 MG CAPS  Self No No   Sig: Take 1 capsule (1 mg total) by mouth daily   Banophen 50 MG capsule   Yes No   CALCIUM CARBONATE PO  Self Yes No   Sig: Take 1,000 mg by mouth daily     Diclofenac Sodium (Voltaren) 1 %   No No   Sig: Apply 2 g topically 4 (four) times a day   KIONEX 15 GM/60ML suspension  Self Yes No   Sig: Take by mouth Takes as a shake on dialysis days Tues-Thurs_Sat   LORazepam (ATIVAN) 1 mg tablet   No No   Sig: Take 1 tablet (1 mg total) by mouth once in imaging for anxiety for up to 1 dose   Melatonin 3 MG CAPS  Self Yes No   Sig: Take 10 mg by mouth daily at bedtime     NOVOLOG FLEXPEN 100 units/mL SOPN   Yes No   Sig: INJECT 1 TO 5 UNITS SUBCUTANEOUSLY THREE TIMES A DAY BEFORE MELAS AS PER SLIDING SCALE   Podiatric Products (Flexitol Heel Balm) OINT   No No   Sig: Apply topically once for 1 dose   TIMOLOL MALEATE OP  Self Yes No   Sig: Apply 1 drop to eye 2 (two) times a day   acetaminophen (TYLENOL) 325 mg tablet   No No   Sig: Take 2 tablets (650 mg total) by mouth every 6 (six) hours as needed for mild pain or fever   albuterol (ProAir HFA) 90 mcg/act inhaler   No No   Sig: Inhale 2 puffs every 6 (six) hours as needed for wheezing or shortness of breath   atorvastatin (LIPITOR) 20 mg tablet  Self Yes No   Sig: Take 20 mg by mouth every evening    atropine (ISOPTO ATROPINE) 1 % ophthalmic solution   Yes No   Sig: Administer 1 drop to both eyes daily at bedtime    brimonidine (ALPHAGAN P) 0 15 % ophthalmic solution  Self Yes No   Sig: Administer 1 drop to both eyes 2 (two) times a day    carvedilol (COREG) 12 5 mg tablet   Yes No   Sig: TAKE 1 TABLET BY MOUTH ONCE DAILY EXCEPT DIALYSIS DAYS    carvedilol (COREG) 25 mg tablet  Self Yes No   Sig: Take 25 mg by mouth 2 (two) times a day with meals     cinacalcet (SENSIPAR) 30 mg tablet  Self Yes No   Sig: Take 30 mg by mouth daily   diclofenac sodium (VOLTAREN) 1 %   No No   Sig: Apply 2 g topically 4 (four) times a day   diphenhydrAMINE (BENADRYL) 25 mg capsule  Self Yes No   Sig: Take by mouth as needed for itching     dorzolamide-timolol (COSOPT) 22 3-6 8 MG/ML ophthalmic solution  Self Yes No   Sig: instill 1 drop into both eyes twice a day   ergocalciferol (VITAMIN D2) 50,000 units   Yes No   Sig: take 1 capsule by mouth every week FOR 12 WEEKS   gabapentin (NEURONTIN) 100 mg capsule   No No   Sig: Take 3 capsules (300 mg total) by mouth daily at bedtime   insulin glargine (LANTUS) 100 units/mL subcutaneous injection  Self No No   Sig: Inject 30 Units under the skin daily at bedtime   latanoprost (XALATAN) 0 005 % ophthalmic solution  Self Yes No   Sig: Administer 1 drop to both eyes daily at bedtime   levothyroxine 50 mcg tablet  Self Yes No   Sig: Take 50 mcg by mouth daily   lidocaine (LIDODERM) 5 %   Yes No   Sig: APPLY 1 PATCH BY TRANDERMAL ROUTE EVERY DAY (MAY WEAR UP TO 12 HOURS)     loratadine (CLARITIN) 10 mg tablet   Yes No   Sig: Take 10 mg by mouth daily   losartan (COZAAR) 25 mg tablet  Self No No Sig: Take 1 tablet (25 mg total) by mouth daily Take on NON-Dialysis Days   methazolamide (NEPTAZANE) 25 MG tablet  Self Yes No   Sig: Take 25 mg by mouth 3 (three) times a day     methocarbamol (ROBAXIN) 500 mg tablet   No No   Sig: Take 1 tablet (500 mg total) by mouth every 8 (eight) hours as needed for muscle spasms   mupirocin (BACTROBAN) 2 % ointment   No No   Sig: Apply topically 3 (three) times a day   nystatin (MYCOSTATIN) powder   No No   Sig: Apply topically 2 (two) times a day   ondansetron (ZOFRAN) 4 mg tablet  Self No No   Sig: Take 1 tablet (4 mg total) by mouth every 8 (eight) hours as needed for nausea or vomiting   pantoprazole (PROTONIX) 40 mg tablet   No No   Sig: Take 1 tablet (40 mg total) by mouth daily in the early morning   prednisoLONE acetate (PRED FORTE) 1 % ophthalmic suspension  Self Yes No   Sig: Administer 1 drop to both eyes 4 (four) times a day Patient takes only occasionally   pregabalin (LYRICA) 25 mg capsule   No No   Sig: Take 1 PO daily   Please take an additional capsule after dialysis   senna (SENOKOT) 8 6 mg   No No   Sig: Take 2 tablets (17 2 mg total) by mouth daily at bedtime as needed for constipation   sertraline (ZOLOFT) 50 mg tablet  Self No No   Sig: Take 1 tablet (50 mg total) by mouth daily   sevelamer carbonate (RENVELA) 800 mg tablet   Yes No   Sig: TAKE 3 TABLETS BY MOUTH THREE TIMES A DAY WITH MEALS AND 2 TABLETS WITH SNACKS    tobramycin-dexamethasone (TOBRADEX) ophthalmic suspension   No No   Sig: Administer 1 drop into the left eye every 4 (four) hours while awake   torsemide (DEMADEX) 100 mg tablet  Self Yes No   Sig: Take 100 mg by mouth every 12 (twelve) hours    traMADol (ULTRAM) 50 mg tablet Not Taking at Unknown time  No No   Sig: Take 1 tablet (50 mg total) by mouth every 12 (twelve) hours as needed for moderate pain for up to 10 doses   Patient not taking: Reported on 5/6/2021   warfarin (COUMADIN) 2 mg tablet   No No   Sig: Take a 2 mg tablet in addition to a 10 mg tablet for a total of 12 mg daily until INR is therapeutic   Patient taking differently: 12 mg On Sunday ONLY   warfarin (COUMADIN) 3 mg tablet   Yes No   Sig: Take 9 mg by mouth daily Takes 9 mg Monday Sat      Facility-Administered Medications: None       Portions of the record may have been created with voice recognition software  Occasional wrong word or "sound a like" substitutions may have occurred due to the inherent limitations of voice recognition software  Read the chart carefully and recognize, using context, where substitutions have occurred      Electronically signed by:  Lesia Snyder

## 2021-06-08 NOTE — ED PROVIDER NOTES
History  Chief Complaint   Patient presents with    Chest Pain     Pt reports chest pressure since this morning  No SOB at this time just pressure  Pt was sent in from dialysis center  48 yo female presenting for chest pressure and chest tightness which she states started yesterday  She states she has increasing chest tightness and pain with deep inspiration  She does state she has a prior history of blood clots and PEs and does take Coumadin as well as being on dialysis Tuesday, Thursday, Saturday  Denies recent illness, fevers, chills, cough, abdominal pain, nausea/vomiting, diarrhea beyond baseline, constipation, dysuria or hematuria  Prior to Admission Medications   Prescriptions Last Dose Informant Patient Reported? Taking? ALPRAZolam (XANAX) 0 25 mg tablet  Self Yes No   Sig: Take 0 25 mg by mouth 2 (two) times a day as needed for anxiety   ALPRAZolam (XANAX) 0 5 mg tablet   Yes No   Sig: TAKE 1/2 TO 1 TABLET BY MOUTH EVERY 4 HOURS AS NEEDED FOR ANXIETY  Accu-Chek Esther Plus test strip   Yes No   Sig: 3 (three) times a day Use to test blood sugar   Accu-Chek Softclix Lancets lancets   Yes No   Sig: 3 (three) times a day Use to test blood sugar   B Complex-C-Folic Acid (TRIPHROCAPS) 1 MG CAPS  Self No No   Sig: Take 1 capsule (1 mg total) by mouth daily   Banophen 50 MG capsule   Yes No   CALCIUM CARBONATE PO  Self Yes No   Sig: Take 1,000 mg by mouth daily     Diclofenac Sodium (Voltaren) 1 %   No No   Sig: Apply 2 g topically 4 (four) times a day   KIONEX 15 GM/60ML suspension  Self Yes No   Sig: Take by mouth Takes as a shake on dialysis days Tues-Thurs_Sat   LORazepam (ATIVAN) 1 mg tablet   No No   Sig: Take 1 tablet (1 mg total) by mouth once in imaging for anxiety for up to 1 dose   Melatonin 3 MG CAPS  Self Yes No   Sig: Take 10 mg by mouth daily at bedtime     NOVOLOG FLEXPEN 100 units/mL SOPN   Yes No   Sig: INJECT 1 TO 5 UNITS SUBCUTANEOUSLY THREE TIMES A DAY BEFORE MELAS AS PER SLIDING SCALE   Podiatric Products (Flexitol Heel Balm) OINT   No No   Sig: Apply topically once for 1 dose   TIMOLOL MALEATE OP  Self Yes No   Sig: Apply 1 drop to eye 2 (two) times a day   acetaminophen (TYLENOL) 325 mg tablet   No No   Sig: Take 2 tablets (650 mg total) by mouth every 6 (six) hours as needed for mild pain or fever   albuterol (ProAir HFA) 90 mcg/act inhaler   No No   Sig: Inhale 2 puffs every 6 (six) hours as needed for wheezing or shortness of breath   atorvastatin (LIPITOR) 20 mg tablet  Self Yes No   Sig: Take 20 mg by mouth every evening    atropine (ISOPTO ATROPINE) 1 % ophthalmic solution   Yes No   Sig: Administer 1 drop to both eyes daily at bedtime    brimonidine (ALPHAGAN P) 0 15 % ophthalmic solution  Self Yes No   Sig: Administer 1 drop to both eyes 2 (two) times a day    carvedilol (COREG) 12 5 mg tablet   Yes No   Sig: TAKE 1 TABLET BY MOUTH ONCE DAILY EXCEPT DIALYSIS DAYS    carvedilol (COREG) 25 mg tablet  Self Yes No   Sig: Take 25 mg by mouth 2 (two) times a day with meals     cinacalcet (SENSIPAR) 30 mg tablet  Self Yes No   Sig: Take 30 mg by mouth daily   diclofenac sodium (VOLTAREN) 1 %   No No   Sig: Apply 2 g topically 4 (four) times a day   diphenhydrAMINE (BENADRYL) 25 mg capsule  Self Yes No   Sig: Take by mouth as needed for itching     dorzolamide-timolol (COSOPT) 22 3-6 8 MG/ML ophthalmic solution  Self Yes No   Sig: instill 1 drop into both eyes twice a day   ergocalciferol (VITAMIN D2) 50,000 units   Yes No   Sig: take 1 capsule by mouth every week FOR 12 WEEKS   gabapentin (NEURONTIN) 100 mg capsule   No No   Sig: Take 3 capsules (300 mg total) by mouth daily at bedtime   insulin glargine (LANTUS) 100 units/mL subcutaneous injection  Self No No   Sig: Inject 30 Units under the skin daily at bedtime   latanoprost (XALATAN) 0 005 % ophthalmic solution  Self Yes No   Sig: Administer 1 drop to both eyes daily at bedtime   levothyroxine 50 mcg tablet  Self Yes No Sig: Take 50 mcg by mouth daily   lidocaine (LIDODERM) 5 %   Yes No   Sig: APPLY 1 PATCH BY TRANDERMAL ROUTE EVERY DAY (MAY WEAR UP TO 12 HOURS)  loratadine (CLARITIN) 10 mg tablet   Yes No   Sig: Take 10 mg by mouth daily   losartan (COZAAR) 25 mg tablet  Self No No   Sig: Take 1 tablet (25 mg total) by mouth daily Take on NON-Dialysis Days   methazolamide (NEPTAZANE) 25 MG tablet  Self Yes No   Sig: Take 25 mg by mouth 3 (three) times a day     methocarbamol (ROBAXIN) 500 mg tablet   No No   Sig: Take 1 tablet (500 mg total) by mouth every 8 (eight) hours as needed for muscle spasms   mupirocin (BACTROBAN) 2 % ointment   No No   Sig: Apply topically 3 (three) times a day   nystatin (MYCOSTATIN) powder   No No   Sig: Apply topically 2 (two) times a day   ondansetron (ZOFRAN) 4 mg tablet  Self No No   Sig: Take 1 tablet (4 mg total) by mouth every 8 (eight) hours as needed for nausea or vomiting   pantoprazole (PROTONIX) 40 mg tablet   No No   Sig: Take 1 tablet (40 mg total) by mouth daily in the early morning   prednisoLONE acetate (PRED FORTE) 1 % ophthalmic suspension  Self Yes No   Sig: Administer 1 drop to both eyes 4 (four) times a day Patient takes only occasionally   pregabalin (LYRICA) 25 mg capsule   No No   Sig: Take 1 PO daily   Please take an additional capsule after dialysis   senna (SENOKOT) 8 6 mg   No No   Sig: Take 2 tablets (17 2 mg total) by mouth daily at bedtime as needed for constipation   sertraline (ZOLOFT) 50 mg tablet  Self No No   Sig: Take 1 tablet (50 mg total) by mouth daily   sevelamer carbonate (RENVELA) 800 mg tablet   Yes No   Sig: TAKE 3 TABLETS BY MOUTH THREE TIMES A DAY WITH MEALS AND 2 TABLETS WITH SNACKS    tobramycin-dexamethasone (TOBRADEX) ophthalmic suspension   No No   Sig: Administer 1 drop into the left eye every 4 (four) hours while awake   torsemide (DEMADEX) 100 mg tablet  Self Yes No   Sig: Take 100 mg by mouth every 12 (twelve) hours    traMADol (ULTRAM) 50 mg tablet Not Taking at Unknown time  No No   Sig: Take 1 tablet (50 mg total) by mouth every 12 (twelve) hours as needed for moderate pain for up to 10 doses   Patient not taking: Reported on 2021   warfarin (COUMADIN) 2 mg tablet   No No   Sig: Take a 2 mg tablet in addition to a 10 mg tablet for a total of 12 mg daily until INR is therapeutic   Patient taking differently: 12 mg On  ONLY   warfarin (COUMADIN) 3 mg tablet   Yes No   Sig: Take 9 mg by mouth daily Takes 9 mg Monday Sat      Facility-Administered Medications: None       Past Medical History:   Diagnosis Date    Abnormal uterine bleeding (AUB)     Anxiety     Arthritis     Chronic kidney disease     Claustrophobia     Diabetes mellitus [facility]     Disease of thyroid gland     DVT (deep venous thrombosis) (HCC)     End stage kidney disease ([facility]     Foot ulcer due to secondary DM ([facility]     Hemodialysis patient ([facility] )     Tues, Thurs, Sat    Hypertension     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[facility]     right eye    Morbid obesity ([facility]     Osteomyelitis of fifth toe of right foot ([facility]     Panic attacks     Pulmonary embolism (HCC)     Reflux esophagitis     Thrombophlebitis of saphenous vein     Warfarin anticoagulation        Past Surgical History:   Procedure Laterality Date    AMPUTATION      r 4th toe    ARTERIOVENOUS GRAFT PLACEMENT      CATARACT EXTRACTION Bilateral     CERVICAL BIOPSY  W/ LOOP ELECTRODE EXCISION       SECTION      DIALYSIS FISTULA CREATION      DILATION AND CURETTAGE OF UTERUS      ENDOMETRIAL ABLATION W/ NOVASURE      EYE SURGERY      HYSTERECTOMY      @ age 57 (complete)    IR AV FISTULAGRAM/GRAFTOGRAM  10/11/2019    IR AV FISTULAGRAM/GRAFTOGRAM  2020    IR AV FISTULAGRAM/GRAFTOGRAM  2020    OOPHORECTOMY Bilateral     @ age 57    PERICARDIUM SURGERY      FL AMPUTATION TOE,MT-P JT Right 2020    Procedure: AMPUTATION TOE- 5th;  Surgeon: Nga Blackmon DPM; Location: [facility];  Service: Podiatry    KS COLONOSCOPY FLX DX W/COLLJ SPEC WHEN PFRMD N/A 3/13/2019    Procedure: COLONOSCOPY;  Surgeon: Macy Jauregui MD;  [facility]: [facility]; Service: Gastroenterology    KS ESOPHAGOGASTRODUODENOSCOPY TRANSORAL DIAGNOSTIC N/A 3/13/2019    Procedure: ESOPHAGOGASTRODUODENOSCOPY (EGD); Surgeon: Macy Jauregui MD;  [facility]: [facility]; Service: Gastroenterology    KS LAPAROSCOPY W TOT HYSTERECT UTERUS 250 GRAM OR LESS N/A 2/16/2016    Procedure: HYSTERECTOMY LAPAROSCOPIC TOTAL [facility]), with bilateral salpingectomy;  Surgeon: Yelitza Mcdonald DO;  [facility];  Service: Gynecology    THROMBECTOMY / ARTERIOVENOUS GRAFT REVISION      TOE SURGERY Right     removal of the 4th toe       Family History   Problem Relation Age of Onset    Heart disease Family     Diabetes Family     Heart disease Mother     Cancer Brother         neck    Throat cancer Brother     Ovarian cancer Maternal Aunt 36     I have reviewed and agree with the history as documented  E-Cigarette/Vaping    E-Cigarette Use Never User      E-Cigarette/Vaping Substances    Nicotine No     THC No     CBD No     Flavoring No     Other No     Unknown No      Social History     Tobacco Use    Smoking status: Never Smoker    Smokeless tobacco: Never Used   Substance Use Topics    Alcohol use: Never     Alcohol/week: 0 0 standard drinks     Frequency: Never     Drinks per session: Patient refused     Binge frequency: Patient refused    Drug use: No        Review of Systems   Respiratory: Positive for cough, chest tightness and shortness of breath  Musculoskeletal: Positive for back pain (chronic)  All other systems reviewed and are negative        Physical Exam  ED Triage Vitals   Temperature Pulse Respirations Blood Pressure SpO2   06/08/21 0734 06/08/21 0734 06/08/21 0734 06/08/21 0830 06/08/21 0734   97 6 °F (36 4 °C) 74 18 166/85 100 %      Temp Source Heart Rate Source Patient Position - Orthostatic VS BP Location FiO2 (%)   06/08/21 0734 06/08/21 0734 06/08/21 0830 06/08/21 0830 --   Oral Monitor Lying Right arm       Pain Score       06/08/21 0826       8             Orthostatic Vital Signs  Vitals:    06/08/21 0945 06/08/21 1622 06/09/21 0000 06/09/21 0245   BP: (!) 180/78 113/51 (!) 103/46 167/69   Pulse: 78 71 72 72   Patient Position - Orthostatic VS: Lying  Lying Lying       Physical Exam  Vitals signs and nursing note reviewed  Constitutional:       General: She is not in acute distress  Appearance: She is well-developed  She is not diaphoretic  HENT:      Head: Normocephalic and atraumatic  Right Ear: External ear normal       Left Ear: External ear normal       Nose: Nose normal    Eyes:      General: No scleral icterus  Right eye: No discharge  Left eye: No discharge  Conjunctiva/sclera: Conjunctivae normal       Pupils: Pupils are equal, round, and reactive to light  Neck:      Musculoskeletal: Normal range of motion and neck supple  Vascular: No JVD  Trachea: No tracheal deviation  Cardiovascular:      Rate and Rhythm: Normal rate and regular rhythm  Heart sounds: Normal heart sounds  No murmur  No friction rub  No gallop  Pulmonary:      Effort: Pulmonary effort is normal  No respiratory distress  Breath sounds: Normal breath sounds  No stridor  No decreased breath sounds, wheezing, rhonchi or rales  Abdominal:      General: Bowel sounds are normal  There is no distension  Palpations: Abdomen is soft  There is no mass  Tenderness: There is no abdominal tenderness  There is no guarding  Musculoskeletal: Normal range of motion  General: No tenderness or deformity  Skin:     General: Skin is warm and dry  Coloration: Skin is not pale  Findings: No erythema or rash  Neurological:      Mental Status: She is alert and oriented to person, place, and time        Cranial Nerves: No cranial nerve deficit  Sensory: No sensory deficit  Motor: No abnormal muscle tone  Psychiatric:         Behavior: Behavior normal          Thought Content:  Thought content normal          Judgment: Judgment normal          ED Medications  Medications   acetaminophen (TYLENOL) tablet 650 mg (650 mg Oral Given 6/8/21 2201)   albuterol (PROVENTIL HFA,VENTOLIN HFA) inhaler 2 puff (has no administration in time range)   ALPRAZolam (XANAX) tablet 0 25 mg (0 25 mg Oral Given 6/9/21 0039)   atorvastatin (LIPITOR) tablet 20 mg (20 mg Oral Given 6/8/21 1720)   atropine (ISOPTO ATROPINE) 1 % ophthalmic solution 1 drop (1 drop Both Eyes Given 6/8/21 2157)   brimonidine (ALPHAGAN P) 0 15 % ophthalmic solution 1 drop (1 drop Both Eyes Given 6/8/21 1821)   carvedilol (COREG) tablet 25 mg (25 mg Oral Given 6/8/21 1720)   cinacalcet (SENSIPAR) tablet 30 mg (has no administration in time range)   dorzolamide-timolol (COSOPT) 22 3-6 8 MG/ML ophthalmic solution 1 drop (1 drop Both Eyes Given 6/8/21 1822)   gabapentin (NEURONTIN) capsule 300 mg (300 mg Oral Given 6/8/21 2156)   insulin glargine (LANTUS) subcutaneous injection 30 Units 0 3 mL (30 Units Subcutaneous Given 6/8/21 2156)   latanoprost (XALATAN) 0 005 % ophthalmic solution 1 drop (1 drop Both Eyes Given 6/8/21 2157)   levothyroxine tablet 50 mcg (has no administration in time range)   lidocaine (LIDODERM) 5 % patch 1 patch (has no administration in time range)   loratadine (CLARITIN) tablet 10 mg (has no administration in time range)   LORazepam (ATIVAN) tablet 1 mg (has no administration in time range)   insulin lispro (HumaLOG) 100 units/mL subcutaneous injection 3 Units (3 Units Subcutaneous Given 6/8/21 1720)   nystatin (MYCOSTATIN) powder (1 application Topical Given 6/8/21 1822)   ondansetron (ZOFRAN-ODT) dispersible tablet 4 mg (has no administration in time range)   pantoprazole (PROTONIX) EC tablet 40 mg (40 mg Oral Given 6/9/21 0524)   prednisoLONE acetate (PRED FORTE) 1 % ophthalmic suspension 1 drop (1 drop Both Eyes Given 6/8/21 2157)   senna (SENOKOT) tablet 17 2 mg (has no administration in time range)   sertraline (ZOLOFT) tablet 50 mg (has no administration in time range)   sevelamer (RENAGEL) tablet 800 mg (800 mg Oral Given 6/8/21 1819)   torsemide (DEMADEX) tablet 100 mg (100 mg Oral Given 6/9/21 0524)   warfarin (COUMADIN) tablet 9 mg (9 mg Oral Given 6/8/21 1820)   traMADol (ULTRAM) tablet 50 mg (50 mg Oral Given 6/9/21 0038)   lidocaine (LIDODERM) 5 % patch 1 patch (1 patch Topical Medication Applied 6/9/21 0523)   diphenhydrAMINE (BENADRYL) tablet 50 mg (50 mg Oral Given 6/8/21 0828)   calcium gluconate 2 g in sodium chloride 0 9% 100 mL (premix) (0 g Intravenous Stopped 6/8/21 0945)   HYDROmorphone HCl (DILAUDID) injection 0 2 mg (0 2 mg Intravenous Given 6/8/21 0826)   insulin regular (HumuLIN R,NovoLIN R) injection 10 Units (10 Units Intravenous Given 6/8/21 1001)   dextrose 50 % IV solution 25 mL (25 mL Intravenous Given 6/8/21 0953)   albuterol inhalation solution 10 mg (10 mg Nebulization Given 6/8/21 0959)   sodium bicarbonate 8 4 % injection 50 mEq (50 mEq Intravenous Given 6/8/21 0955)   iodixanol (VISIPAQUE) 320 MG/ML injection 100 mL (100 mL Intravenous Given 6/8/21 1017)       Diagnostic Studies  Results Reviewed     Procedure Component Value Units Date/Time    Basic metabolic panel [[id number]]  (Abnormal) Collected: 06/09/21 0517    Lab Status: Final result Specimen: Blood from Hand, Right Updated: 06/09/21 0615     Sodium 133 mmol/L      Potassium 5 0 mmol/L      Chloride 97 mmol/L      CO2 30 mmol/L      ANION GAP 6 mmol/L      BUN 44 mg/dL      Creatinine 6 38 mg/dL      Glucose 241 mg/dL      Calcium 8 2 mg/dL      eGFR 7 ml/min/1 73sq m     Narrative:      [place] guidelines for Chronic Kidney Disease (CKD):     Stage 1 with normal or high GFR (GFR > 90 mL/min/1 73 square meters)    Stage 2 Mild CKD (GFR = 60-89 mL/min/1 73 square meters)    Stage 3A Moderate CKD (GFR = 45-59 mL/min/1 73 square meters)    Stage 3B Moderate CKD (GFR = 30-44 mL/min/1 73 square meters)    Stage 4 Severe CKD (GFR = 15-29 mL/min/1 73 square meters)    Stage 5 End Stage CKD (GFR <15 mL/min/1 73 square meters)  Note: GFR calculation is accurate only with a steady state creatinine    Protime-INR [[id number]]  (Abnormal) Collected: 06/09/21 0517    Lab Status: Final result Specimen: Blood from Hand, Right Updated: 06/09/21 0602     Protime 26 5 seconds      INR 2 46    CBC and differential [[id number]]  (Abnormal) Collected: 06/09/21 0517    Lab Status: Final result Specimen: Blood from Hand, Right Updated: 06/09/21 0533     WBC 7 03 Thousand/uL      RBC 2 83 Million/uL      Hemoglobin 8 9 g/dL      Hematocrit 27 2 %      MCV 96 fL      MCH 31 4 pg      MCHC 32 7 g/dL      RDW 13 1 %      MPV 11 0 fL      Platelets 804 Thousands/uL      nRBC 0 /100 WBCs      Neutrophils Relative 79 %      Immat GRANS % 1 %      Lymphocytes Relative 10 %      Monocytes Relative 8 %      Eosinophils Relative 2 %      Basophils Relative 0 %      Neutrophils Absolute 5 56 Thousands/µL      Immature Grans Absolute 0 04 Thousand/uL      Lymphocytes Absolute 0 71 Thousands/µL      Monocytes Absolute 0 53 Thousand/µL      Eosinophils Absolute 0 17 Thousand/µL      Basophils Absolute 0 02 Thousands/µL     Troponin I [[id number]]  (Normal) Collected: 06/08/21 2116    Lab Status: Final result Specimen: Blood from Arm, Right Updated: 06/08/21 2151     Troponin I <0 02 ng/mL     Troponin I [[id number]]     Lab Status: No result Specimen: Blood     Troponin I [[id number]]  (Normal) Collected: 06/08/21 1728    Lab Status: Final result Specimen: Blood from Arm, Right Updated: 06/08/21 1803     Troponin I <0 02 ng/mL     Fingerstick Glucose (POCT) [[id number]]  (Normal) Collected: 06/08/21 1400    Lab Status: Final result Updated: 06/08/21 1401     POC Glucose 94 mg/dl     Novel Coronavirus (Covid-19),PCR [facility] - 2 Hour Stat [[id number]]  (Normal) Collected: 06/08/21 0836    Lab Status: Final result Specimen: Nares from Nose Updated: 06/08/21 1003     SARS-CoV-2 Negative    Narrative:           Fingerstick Glucose (POCT) [[id number]]  (Abnormal) Collected: 06/08/21 0917    Lab Status: Final result Updated: 06/08/21 0919     POC Glucose 221 mg/dl     Basic metabolic panel [[id number]]  (Abnormal) Collected: 06/08/21 0759    Lab Status: Final result Specimen: Blood from Arm, Right Updated: 06/08/21 0905     Sodium 134 mmol/L      Potassium 7 8 mmol/L      Chloride 104 mmol/L      CO2 26 mmol/L      ANION GAP 4 mmol/L      BUN 95 mg/dL      Creatinine 10 30 mg/dL      Glucose 230 mg/dL      Calcium 8 3 mg/dL      eGFR 4 ml/min/1 73sq m     Narrative:      [place] guidelines for Chronic Kidney Disease (CKD):     Stage 1 with normal or high GFR (GFR > 90 mL/min/1 73 square meters)    Stage 2 Mild CKD (GFR = 60-89 mL/min/1 73 square meters)    Stage 3A Moderate CKD (GFR = 45-59 mL/min/1 73 square meters)    Stage 3B Moderate CKD (GFR = 30-44 mL/min/1 73 square meters)    Stage 4 Severe CKD (GFR = 15-29 mL/min/1 73 square meters)    Stage 5 End Stage CKD (GFR <15 mL/min/1 73 square meters)  Note: GFR calculation is accurate only with a steady state creatinine    NT-BNP PRO [[id number]]  (Abnormal) Collected: 06/08/21 0759    Lab Status: Final result Specimen: Blood from Arm, Right Updated: 06/08/21 0901     NT-proBNP 7,551 pg/mL     Troponin I [[id number]]  (Normal) Collected: 06/08/21 0759    Lab Status: Final result Specimen: Blood from Arm, Right Updated: 06/08/21 0837     Troponin I <0 02 ng/mL     Protime-INR [[id number]]  (Abnormal) Collected: 06/08/21 0759    Lab Status: Final result Specimen: Blood from Arm, Right Updated: 06/08/21 0831     Protime 30 3 seconds      INR 2 92    APTT [[id number]]  (Abnormal) Collected: 06/08/21 0759    Lab Status: Final result Specimen: Blood from Arm, Right Updated: 06/08/21 0831     PTT 42 seconds     CBC and differential [[id number]]  (Abnormal) Collected: 06/08/21 0759    Lab Status: Final result Specimen: Blood from Arm, Right Updated: 06/08/21 0811     WBC 6 76 Thousand/uL      RBC 2 86 Million/uL      Hemoglobin 9 0 g/dL      Hematocrit 27 6 %      MCV 97 fL      MCH 31 5 pg      MCHC 32 6 g/dL      RDW 13 2 %      MPV 11 1 fL      Platelets 834 Thousands/uL      nRBC 0 /100 WBCs      Neutrophils Relative 72 %      Immat GRANS % 1 %      Lymphocytes Relative 16 %      Monocytes Relative 7 %      Eosinophils Relative 3 %      Basophils Relative 1 %      Neutrophils Absolute 4 96 Thousands/µL      Immature Grans Absolute 0 04 Thousand/uL      Lymphocytes Absolute 1 06 Thousands/µL      Monocytes Absolute 0 49 Thousand/µL      Eosinophils Absolute 0 17 Thousand/µL      Basophils Absolute 0 04 Thousands/µL                  CTA ED chest PE Study   Final Result by Aruna Vazquez DO (06/08 1106)      Evaluation of the pulmonary arteries somewhat limited due to respiratory motion  No acute pulmonary embolus identified  No acute abnormality identified              Workstation performed: [id number]               Procedures  ECG 12 Lead Documentation Only    Date/Time: 6/8/2021 8:22 AM  Performed by: Jenniffer Rizzo DO  Authorized by: Jenniffer Rizzo DO     Patient location:  ED  Previous ECG:     Previous ECG:  Compared to current    Comparison ECG info:  ? peaked T waves    Similarity:  Changes noted    Comparison to cardiac monitor: Yes    Interpretation:     Interpretation: abnormal    Rate:     ECG rate:  74    ECG rate assessment: normal    Rhythm:     Rhythm: sinus rhythm    Ectopy:     Ectopy: none    QRS:     QRS axis:  Normal    QRS intervals:  Normal  Conduction:     Conduction: normal    ST segments:     ST segments:  Normal  T waves:     T waves: normal            ED Course  ED Course as of Jun 09 0649 Tue Jun 08, 2021 0914 Discussed case with nephro, given hyper K treatment  Will get dialysis   Potassium(!!): 7 8   1042 Procedure Note for Ultrasound Guided Peripheral Line:  Skin prepared using Chloraprep  In the cephalic vein, using ultrasound guidance (CPT code 32504), a 20G peripheral IV long angiocatheter was placed successfully by physician secondary to difficulty placing IV by nursing staff  Successful  1One attempt  Good blood return  Good palpable flush  Adhered to skin using Tegederm  1442 Hyper-K  Dialysis  Admitted to slim                HEART Risk Score      Most Recent Value   Heart Score Risk Calculator   History  1 Filed at: 06/08/2021 1416   ECG  1 Filed at: 06/08/2021 1416   Age  1 Filed at: 06/08/2021 1416   Risk Factors  2 Filed at: 06/08/2021 1416   Troponin  0 Filed at: 06/08/2021 1416   HEART Score  5 Filed at: 06/08/2021 1416                      SBIRT 20yo+      Most Recent Value   SBIRT (25 yo +)   In order to provide better care to our patients, we are screening all of our patients for alcohol and drug use  Would it be okay to ask you these screening questions? No Filed at: 06/08/2021 0827                MDM  Number of Diagnoses or Management Options  Chest pain:   Hyperkalemia:   Diagnosis management comments: Patient with hx of blood clots on coumadin with pleuritic pain and sob  EKG concerning for possible hyper K --> given Ca prophylactically  States she hasnt missed dialysis  CT PE, blood work, Nephro consult and dialysis      Admit to medicine with nephro consult      Disposition  Final diagnoses:   Chest pain   Hyperkalemia     Time reflects when diagnosis was documented in both MDM as applicable and the Disposition within this note     Time User Action Codes Description Comment    6/8/2021  9:50 AM Zeina [place] Add [R07 9] Chest pain     6/8/2021  9:50 AM Zeina Champ Add [E87 5] Hyperkalemia     6/8/2021  2:37 PM Farhad Valdovinos S Add [N18 6,  Z99 2] ESRD on hemodialysis ([place] [address] )     6/8/2021  2:37 PM Guerline Bright Modify [D26 1,  Z99 2] ESRD on hemodialysis ([place] [address] )     6/8/2021  2:37 PM [place] Do S Remove [N18 6,  Z99 2] ESRD on hemodialysis ([place] [address] )     6/8/2021  2:37 PM [place] Do S Add [N18 6,  Z99 2] ESRD on hemodialysis ([place] [address] )     6/8/2021  2:37 PM Guerline Bright Modify [N18 6,  Z99 2] ESRD on hemodialysis [facility])       ED Disposition     ED Disposition Condition Date/Time Comment    Admit Stable Tue Jun 8, 2021  1:27 PM Case was discussed with HARISH and the patient's admission status was agreed to be Admission Status: inpatient status to the service of Dr Cynthia Briones  Follow-up Information    None         Current Discharge Medication List      CONTINUE these medications which have NOT CHANGED    Details   Accu-Chek Esther Plus test strip 3 (three) times a day Use to test blood sugar      Accu-Chek Softclix Lancets lancets 3 (three) times a day Use to test blood sugar      acetaminophen (TYLENOL) 325 mg tablet Take 2 tablets (650 mg total) by mouth every 6 (six) hours as needed for mild pain or fever  Qty: 30 tablet, Refills: 0    Associated Diagnoses: Atypical chest pain      albuterol (ProAir HFA) 90 mcg/act inhaler Inhale 2 puffs every 6 (six) hours as needed for wheezing or shortness of breath  Qty: 8 5 g, Refills: 0    Comments: Substitution to a formulary equivalent within the same pharmaceutical class is authorized  Associated Diagnoses: Flu-like symptoms      !! ALPRAZolam (XANAX) 0 25 mg tablet Take 0 25 mg by mouth 2 (two) times a day as needed for anxiety      !! ALPRAZolam (XANAX) 0 5 mg tablet TAKE 1/2 TO 1 TABLET BY MOUTH EVERY 4 HOURS AS NEEDED FOR ANXIETY        atorvastatin (LIPITOR) 20 mg tablet Take 20 mg by mouth every evening   Refills: 0      atropine (ISOPTO ATROPINE) 1 % ophthalmic solution Administer 1 drop to both eyes daily at bedtime       B Complex-C-Folic Acid (TRIPHROCAPS) 1 MG CAPS Take 1 capsule (1 mg total) by mouth daily  Qty: 30 capsule, Refills: 0    Associated Diagnoses: ESRD on hemodialysis ([address] )      ! ! Banophen 50 MG capsule       brimonidine (ALPHAGAN P) 0 15 % ophthalmic solution Administer 1 drop to both eyes 2 (two) times a day       CALCIUM CARBONATE PO Take 1,000 mg by mouth daily  !! carvedilol (COREG) 12 5 mg tablet TAKE 1 TABLET BY MOUTH ONCE DAILY EXCEPT DIALYSIS DAYS  !! carvedilol (COREG) 25 mg tablet Take 25 mg by mouth 2 (two) times a day with meals        cinacalcet (SENSIPAR) 30 mg tablet Take 30 mg by mouth daily      diclofenac sodium (VOLTAREN) 1 % Apply 2 g topically 4 (four) times a day  Qty: 200 g, Refills: 0    Associated Diagnoses: Strain of cervical portion of right trapezius muscle; Right knee pain      Diclofenac Sodium (Voltaren) 1 % Apply 2 g topically 4 (four) times a day  Qty: 100 g, Refills: 1    Associated Diagnoses: Pain of left upper extremity      !! diphenhydrAMINE (BENADRYL) 25 mg capsule Take by mouth as needed for itching        dorzolamide-timolol (COSOPT) 22 3-6 8 MG/ML ophthalmic solution instill 1 drop into both eyes twice a day  Refills: 0      ergocalciferol (VITAMIN D2) 50,000 units take 1 capsule by mouth every week FOR 12 WEEKS      gabapentin (NEURONTIN) 100 mg capsule Take 3 capsules (300 mg total) by mouth daily at bedtime  Refills: 0    Associated Diagnoses: Chronic pain of both knees      insulin glargine (LANTUS) 100 units/mL subcutaneous injection Inject 30 Units under the skin daily at bedtime  Refills: 0    Associated Diagnoses: Type 2 diabetes mellitus (HCC)      KIONEX 15 GM/60ML suspension Take by mouth Takes as a shake on dialysis days Tues-Thurs_Sat  Refills: 0      latanoprost (XALATAN) 0 005 % ophthalmic solution Administer 1 drop to both eyes daily at bedtime      levothyroxine 50 mcg tablet Take 50 mcg by mouth daily      lidocaine (LIDODERM) 5 % APPLY 1 PATCH BY TRANDERMAL ROUTE EVERY DAY (MAY WEAR UP TO 12 HOURS)        loratadine (CLARITIN) 10 mg tablet Take 10 mg by mouth daily      LORazepam (ATIVAN) 1 mg tablet Take 1 tablet (1 mg total) by mouth once in imaging for anxiety for up to 1 dose  Qty: 1 tablet, Refills: 0    Associated Diagnoses: History of claustrophobia      losartan (COZAAR) 25 mg tablet Take 1 tablet (25 mg total) by mouth daily Take on NON-Dialysis Days    Associated Diagnoses: Hypertensive urgency      Melatonin 3 MG CAPS Take 10 mg by mouth daily at bedtime        methazolamide (NEPTAZANE) 25 MG tablet Take 25 mg by mouth 3 (three) times a day        methocarbamol (ROBAXIN) 500 mg tablet Take 1 tablet (500 mg total) by mouth every 8 (eight) hours as needed for muscle spasms  Qty: 24 tablet, Refills: 0    Associated Diagnoses: Musculoskeletal back pain      mupirocin (BACTROBAN) 2 % ointment Apply topically 3 (three) times a day  Qty: 15 g, Refills: 0    Associated Diagnoses: Acute paronychia of finger      NOVOLOG FLEXPEN 100 units/mL SOPN INJECT 1 TO 5 UNITS SUBCUTANEOUSLY THREE TIMES A DAY BEFORE MELAS AS PER SLIDING SCALE      nystatin (MYCOSTATIN) powder Apply topically 2 (two) times a day  Qty: 15 g, Refills: 0    Associated Diagnoses: Candidiasis of breast      ondansetron (ZOFRAN) 4 mg tablet Take 1 tablet (4 mg total) by mouth every 8 (eight) hours as needed for nausea or vomiting  Qty: 12 tablet, Refills: 0    Associated Diagnoses: Nausea      pantoprazole (PROTONIX) 40 mg tablet Take 1 tablet (40 mg total) by mouth daily in the early morning  Refills: 0    Associated Diagnoses: Gastroesophageal reflux disease      Podiatric Products (Flexitol Heel Balm) OINT Apply topically once for 1 dose  Qty: 112 g, Refills: 2    Associated Diagnoses: Corns and callosities      prednisoLONE acetate (PRED FORTE) 1 % ophthalmic suspension Administer 1 drop to both eyes 4 (four) times a day Patient takes only occasionally      pregabalin (LYRICA) 25 mg capsule Take 1 PO daily   Please take an additional capsule after dialysis  Qty: 40 capsule, Refills: 1    Associated Diagnoses: Chronic bilateral low back pain, unspecified whether sciatica present; Diabetic polyneuropathy associated with type 2 diabetes mellitus ([facility] ); Lumbar radiculopathy      senna (SENOKOT) 8 6 mg Take 2 tablets (17 2 mg total) by mouth daily at bedtime as needed for constipation  Qty: 30 each, Refills: 0    Associated Diagnoses: Ambulatory dysfunction      sertraline (ZOLOFT) 50 mg tablet Take 1 tablet (50 mg total) by mouth daily  Qty: 30 tablet, Refills: 0    Associated Diagnoses: Anxiety disorder      sevelamer carbonate (RENVELA) 800 mg tablet TAKE 3 TABLETS BY MOUTH THREE TIMES A DAY WITH MEALS AND 2 TABLETS WITH SNACKS  TIMOLOL MALEATE OP Apply 1 drop to eye 2 (two) times a day      tobramycin-dexamethasone (TOBRADEX) ophthalmic suspension Administer 1 drop into the left eye every 4 (four) hours while awake  Qty: 5 mL, Refills: 0    Associated Diagnoses: Preseptal cellulitis of left eye      torsemide (DEMADEX) 100 mg tablet Take 100 mg by mouth every 12 (twelve) hours   Refills: 0      traMADol (ULTRAM) 50 mg tablet Take 1 tablet (50 mg total) by mouth every 12 (twelve) hours as needed for moderate pain for up to 10 doses  Qty: 10 tablet, Refills: 0    Associated Diagnoses: Diabetic foot ulcer associated with diabetes mellitus due to underlying condition, unspecified laterality, unspecified part of foot, unspecified ulcer stage ([place] [id number] )      ! ! warfarin (COUMADIN) 2 mg tablet Take a 2 mg tablet in addition to a 10 mg tablet for a total of 12 mg daily until INR is therapeutic  Qty: 7 tablet, Refills: 0    Comments: Take a 2 mg tablet in addition to a 10 mg tablet for a total of 12 mg daily until INR is therapeutic  Associated Diagnoses: History of DVT (deep vein thrombosis)      ! ! warfarin (COUMADIN) 3 mg tablet Take 9 mg by mouth daily Takes 9 mg Monday Sat       !! - Potential duplicate medications found  Please discuss with provider          No discharge procedures on file  PDMP Review       Value Time User    PDMP Reviewed  Yes 11/25/2020  3:50 PM Devang Barlow [place]           ED Provider  Attending physically available and evaluated Yossi Reynaga I managed the patient along with the ED Attending      Electronically Signed by         Ciera Rodriguez,   06/08/21 [address] [place] [address], DO  06/09/21 3667

## 2021-06-09 ENCOUNTER — APPOINTMENT (INPATIENT)
Dept: DIALYSIS | Facility: HOSPITAL | Age: 56
DRG: 640 | End: 2021-06-09
Payer: MEDICARE

## 2021-06-09 ENCOUNTER — APPOINTMENT (INPATIENT)
Dept: NEUROLOGY | Facility: CLINIC | Age: 56
DRG: 640 | End: 2021-06-09
Payer: MEDICARE

## 2021-06-09 ENCOUNTER — TELEPHONE (OUTPATIENT)
Dept: VASCULAR SURGERY | Facility: CLINIC | Age: 56
End: 2021-06-09

## 2021-06-09 ENCOUNTER — APPOINTMENT (INPATIENT)
Dept: RADIOLOGY | Facility: HOSPITAL | Age: 56
DRG: 640 | End: 2021-06-09
Payer: MEDICARE

## 2021-06-09 PROBLEM — R29.90 STROKE-LIKE SYMPTOMS: Status: ACTIVE | Noted: 2021-06-09

## 2021-06-09 LAB
ANION GAP SERPL CALCULATED.3IONS-SCNC: 5 MMOL/L (ref 4–13)
ANION GAP SERPL CALCULATED.3IONS-SCNC: 6 MMOL/L (ref 4–13)
BASOPHILS # BLD AUTO: 0.02 THOUSANDS/ΜL (ref 0–0.1)
BASOPHILS NFR BLD AUTO: 0 % (ref 0–1)
BUN SERPL-MCNC: 25 MG/DL (ref 5–25)
BUN SERPL-MCNC: 44 MG/DL (ref 5–25)
CALCIUM SERPL-MCNC: 8.1 MG/DL (ref 8.3–10.1)
CALCIUM SERPL-MCNC: 8.2 MG/DL (ref 8.3–10.1)
CHLORIDE SERPL-SCNC: 97 MMOL/L (ref 100–108)
CHLORIDE SERPL-SCNC: 98 MMOL/L (ref 100–108)
CO2 SERPL-SCNC: 30 MMOL/L (ref 21–32)
CO2 SERPL-SCNC: 30 MMOL/L (ref 21–32)
CREAT SERPL-MCNC: 4.57 MG/DL (ref 0.6–1.3)
CREAT SERPL-MCNC: 6.38 MG/DL (ref 0.6–1.3)
EOSINOPHIL # BLD AUTO: 0.17 THOUSAND/ΜL (ref 0–0.61)
EOSINOPHIL NFR BLD AUTO: 2 % (ref 0–6)
ERYTHROCYTE [DISTWIDTH] IN BLOOD BY AUTOMATED COUNT: 13.1 % (ref 11.6–15.1)
GFR SERPL CREATININE-BSD FRML MDRD: 10 ML/MIN/1.73SQ M
GFR SERPL CREATININE-BSD FRML MDRD: 7 ML/MIN/1.73SQ M
GLUCOSE SERPL-MCNC: 148 MG/DL (ref 65–140)
GLUCOSE SERPL-MCNC: 153 MG/DL (ref 65–140)
GLUCOSE SERPL-MCNC: 166 MG/DL (ref 65–140)
GLUCOSE SERPL-MCNC: 174 MG/DL (ref 65–140)
GLUCOSE SERPL-MCNC: 181 MG/DL (ref 65–140)
GLUCOSE SERPL-MCNC: 220 MG/DL (ref 65–140)
GLUCOSE SERPL-MCNC: 241 MG/DL (ref 65–140)
GLUCOSE SERPL-MCNC: 87 MG/DL (ref 65–140)
HCT VFR BLD AUTO: 27.2 % (ref 34.8–46.1)
HGB BLD-MCNC: 8.9 G/DL (ref 11.5–15.4)
IMM GRANULOCYTES # BLD AUTO: 0.04 THOUSAND/UL (ref 0–0.2)
IMM GRANULOCYTES NFR BLD AUTO: 1 % (ref 0–2)
INR PPP: 2.46 (ref 0.84–1.19)
LYMPHOCYTES # BLD AUTO: 0.71 THOUSANDS/ΜL (ref 0.6–4.47)
LYMPHOCYTES NFR BLD AUTO: 10 % (ref 14–44)
MCH RBC QN AUTO: 31.4 PG (ref 26.8–34.3)
MCHC RBC AUTO-ENTMCNC: 32.7 G/DL (ref 31.4–37.4)
MCV RBC AUTO: 96 FL (ref 82–98)
MONOCYTES # BLD AUTO: 0.53 THOUSAND/ΜL (ref 0.17–1.22)
MONOCYTES NFR BLD AUTO: 8 % (ref 4–12)
NEUTROPHILS # BLD AUTO: 5.56 THOUSANDS/ΜL (ref 1.85–7.62)
NEUTS SEG NFR BLD AUTO: 79 % (ref 43–75)
NRBC BLD AUTO-RTO: 0 /100 WBCS
PLATELET # BLD AUTO: 125 THOUSANDS/UL (ref 149–390)
PMV BLD AUTO: 11 FL (ref 8.9–12.7)
POTASSIUM SERPL-SCNC: 4.5 MMOL/L (ref 3.5–5.3)
POTASSIUM SERPL-SCNC: 5 MMOL/L (ref 3.5–5.3)
PROTHROMBIN TIME: 26.5 SECONDS (ref 11.6–14.5)
RBC # BLD AUTO: 2.83 MILLION/UL (ref 3.81–5.12)
SODIUM SERPL-SCNC: 133 MMOL/L (ref 136–145)
SODIUM SERPL-SCNC: 133 MMOL/L (ref 136–145)
WBC # BLD AUTO: 7.03 THOUSAND/UL (ref 4.31–10.16)

## 2021-06-09 PROCEDURE — 95816 EEG AWAKE AND DROWSY: CPT

## 2021-06-09 PROCEDURE — 95819 EEG AWAKE AND ASLEEP: CPT | Performed by: PSYCHIATRY & NEUROLOGY

## 2021-06-09 PROCEDURE — 82948 REAGENT STRIP/BLOOD GLUCOSE: CPT

## 2021-06-09 PROCEDURE — 99232 SBSQ HOSP IP/OBS MODERATE 35: CPT | Performed by: PHYSICIAN ASSISTANT

## 2021-06-09 PROCEDURE — 85025 COMPLETE CBC W/AUTO DIFF WBC: CPT | Performed by: INTERNAL MEDICINE

## 2021-06-09 PROCEDURE — 5A1D70Z PERFORMANCE OF URINARY FILTRATION, INTERMITTENT, LESS THAN 6 HOURS PER DAY: ICD-10-PCS | Performed by: INTERNAL MEDICINE

## 2021-06-09 PROCEDURE — 80048 BASIC METABOLIC PNL TOTAL CA: CPT | Performed by: NURSE PRACTITIONER

## 2021-06-09 PROCEDURE — 99232 SBSQ HOSP IP/OBS MODERATE 35: CPT | Performed by: INTERNAL MEDICINE

## 2021-06-09 PROCEDURE — 70498 CT ANGIOGRAPHY NECK: CPT

## 2021-06-09 PROCEDURE — 85610 PROTHROMBIN TIME: CPT | Performed by: INTERNAL MEDICINE

## 2021-06-09 PROCEDURE — G1004 CDSM NDSC: HCPCS

## 2021-06-09 PROCEDURE — 80048 BASIC METABOLIC PNL TOTAL CA: CPT | Performed by: INTERNAL MEDICINE

## 2021-06-09 PROCEDURE — 70496 CT ANGIOGRAPHY HEAD: CPT

## 2021-06-09 PROCEDURE — NC001 PR NO CHARGE: Performed by: PHYSICIAN ASSISTANT

## 2021-06-09 PROCEDURE — 99223 1ST HOSP IP/OBS HIGH 75: CPT | Performed by: PSYCHIATRY & NEUROLOGY

## 2021-06-09 RX ORDER — TRAMADOL HYDROCHLORIDE 50 MG/1
50 TABLET ORAL 2 TIMES DAILY PRN
Status: DISCONTINUED | OUTPATIENT
Start: 2021-06-09 | End: 2021-06-11 | Stop reason: HOSPADM

## 2021-06-09 RX ORDER — LIDOCAINE 50 MG/G
1 PATCH TOPICAL DAILY
Status: DISCONTINUED | OUTPATIENT
Start: 2021-06-09 | End: 2021-06-11 | Stop reason: HOSPADM

## 2021-06-09 RX ORDER — DEXTROSE MONOHYDRATE 25 G/50ML
25 INJECTION, SOLUTION INTRAVENOUS ONCE
Status: COMPLETED | OUTPATIENT
Start: 2021-06-09 | End: 2021-06-09

## 2021-06-09 RX ORDER — INSULIN GLARGINE 100 [IU]/ML
20 INJECTION, SOLUTION SUBCUTANEOUS
Status: DISCONTINUED | OUTPATIENT
Start: 2021-06-09 | End: 2021-06-10

## 2021-06-09 RX ORDER — DEXTROSE MONOHYDRATE 25 G/50ML
INJECTION, SOLUTION INTRAVENOUS
Status: COMPLETED
Start: 2021-06-09 | End: 2021-06-09

## 2021-06-09 RX ADMIN — WARFARIN SODIUM 9 MG: 3 TABLET ORAL at 17:35

## 2021-06-09 RX ADMIN — LATANOPROST 1 DROP: 50 SOLUTION OPHTHALMIC at 21:20

## 2021-06-09 RX ADMIN — INSULIN LISPRO 3 UNITS: 100 INJECTION, SOLUTION INTRAVENOUS; SUBCUTANEOUS at 17:43

## 2021-06-09 RX ADMIN — LIDOCAINE 1 PATCH: 50 PATCH TOPICAL at 05:23

## 2021-06-09 RX ADMIN — ACETAMINOPHEN 650 MG: 325 TABLET, FILM COATED ORAL at 12:56

## 2021-06-09 RX ADMIN — SEVELAMER HYDROCHLORIDE 800 MG: 800 TABLET, FILM COATED PARENTERAL at 12:57

## 2021-06-09 RX ADMIN — IOHEXOL 100 ML: 350 INJECTION, SOLUTION INTRAVENOUS at 12:30

## 2021-06-09 RX ADMIN — SEVELAMER HYDROCHLORIDE 800 MG: 800 TABLET, FILM COATED PARENTERAL at 07:24

## 2021-06-09 RX ADMIN — CARVEDILOL 25 MG: 25 TABLET, FILM COATED ORAL at 17:35

## 2021-06-09 RX ADMIN — NYSTATIN: 100000 POWDER TOPICAL at 09:34

## 2021-06-09 RX ADMIN — ALPRAZOLAM 0.25 MG: 0.25 TABLET ORAL at 00:39

## 2021-06-09 RX ADMIN — NYSTATIN: 100000 POWDER TOPICAL at 17:21

## 2021-06-09 RX ADMIN — TORSEMIDE 100 MG: 20 TABLET ORAL at 17:36

## 2021-06-09 RX ADMIN — LORATADINE 10 MG: 10 TABLET ORAL at 08:13

## 2021-06-09 RX ADMIN — ATROPINE SULFATE 1 DROP: 10 SOLUTION/ DROPS OPHTHALMIC at 21:20

## 2021-06-09 RX ADMIN — PREDNISOLONE ACETATE 1 DROP: 10 SUSPENSION/ DROPS OPHTHALMIC at 21:20

## 2021-06-09 RX ADMIN — PREDNISOLONE ACETATE 1 DROP: 10 SUSPENSION/ DROPS OPHTHALMIC at 13:05

## 2021-06-09 RX ADMIN — TORSEMIDE 100 MG: 20 TABLET ORAL at 05:24

## 2021-06-09 RX ADMIN — TRAMADOL HYDROCHLORIDE 50 MG: 50 TABLET, FILM COATED ORAL at 21:19

## 2021-06-09 RX ADMIN — SERTRALINE HYDROCHLORIDE 50 MG: 50 TABLET ORAL at 08:12

## 2021-06-09 RX ADMIN — INSULIN LISPRO 3 UNITS: 100 INJECTION, SOLUTION INTRAVENOUS; SUBCUTANEOUS at 07:24

## 2021-06-09 RX ADMIN — LEVOTHYROXINE SODIUM 50 MCG: 50 TABLET ORAL at 08:13

## 2021-06-09 RX ADMIN — PREDNISOLONE ACETATE 1 DROP: 10 SUSPENSION/ DROPS OPHTHALMIC at 08:15

## 2021-06-09 RX ADMIN — ALPRAZOLAM 0.25 MG: 0.25 TABLET ORAL at 21:19

## 2021-06-09 RX ADMIN — SEVELAMER HYDROCHLORIDE 800 MG: 800 TABLET, FILM COATED PARENTERAL at 17:35

## 2021-06-09 RX ADMIN — INSULIN GLARGINE 20 UNITS: 100 INJECTION, SOLUTION SUBCUTANEOUS at 21:19

## 2021-06-09 RX ADMIN — DORZOLAMIDE HYDROCHLORIDE AND TIMOLOL MALEATE 1 DROP: 20; 5 SOLUTION/ DROPS OPHTHALMIC at 17:36

## 2021-06-09 RX ADMIN — TRAMADOL HYDROCHLORIDE 50 MG: 50 TABLET, FILM COATED ORAL at 00:38

## 2021-06-09 RX ADMIN — ATORVASTATIN CALCIUM 20 MG: 20 TABLET, FILM COATED ORAL at 17:36

## 2021-06-09 RX ADMIN — BRIMONIDINE TARTRATE 1 DROP: 1.5 SOLUTION OPHTHALMIC at 17:34

## 2021-06-09 RX ADMIN — CARVEDILOL 25 MG: 25 TABLET, FILM COATED ORAL at 07:25

## 2021-06-09 RX ADMIN — DORZOLAMIDE HYDROCHLORIDE AND TIMOLOL MALEATE 1 DROP: 20; 5 SOLUTION/ DROPS OPHTHALMIC at 08:15

## 2021-06-09 RX ADMIN — DEXTROSE MONOHYDRATE: 500 INJECTION PARENTERAL at 12:54

## 2021-06-09 RX ADMIN — DEXTROSE MONOHYDRATE 25 ML: 500 INJECTION PARENTERAL at 12:54

## 2021-06-09 RX ADMIN — BRIMONIDINE TARTRATE 1 DROP: 1.5 SOLUTION OPHTHALMIC at 08:15

## 2021-06-09 RX ADMIN — PANTOPRAZOLE SODIUM 40 MG: 40 TABLET, DELAYED RELEASE ORAL at 05:24

## 2021-06-09 RX ADMIN — PREDNISOLONE ACETATE 1 DROP: 10 SUSPENSION/ DROPS OPHTHALMIC at 17:36

## 2021-06-09 RX ADMIN — CINACALCET 30 MG: 30 TABLET ORAL at 08:12

## 2021-06-09 RX ADMIN — GABAPENTIN 300 MG: 300 CAPSULE ORAL at 21:19

## 2021-06-09 NOTE — PLAN OF CARE
Post-Dialysis RN Treatment Note    Blood Pressure:  Pre 124/107 mm/Hg  Post 97/65 mmHg   EDW  124 5 kg    Weight:  Pre 127 2 kg   Post 124 4 kg   Mode of weight measurement: Standing Scale   Volume Removed  2800 ml    Treatment duration 120 minutes    NS given  No    Treatment shortened? No   Medications given during Rx None Reported   Estimated Kt/V  0 96   Access type: AV fistula   Access Status: Yes, describe: L lower arm AVF, BFR maintained at 400 throughout treatment as prescrivbed     Report called to primary nurse   Yes Daivd Door RN    Patient receiving an extra hemodialysis treatment today as ordered  Attempting 2 5 liters fluid removal as tolerated by patient       Problem: METABOLIC, FLUID AND ELECTROLYTES - ADULT  Goal: Electrolytes maintained within normal limits  Description: INTERVENTIONS:  - Monitor labs and assess patient for signs and symptoms of electrolyte imbalances  - Administer electrolyte replacement as ordered  - Monitor response to electrolyte replacements, including repeat lab results as appropriate  - Instruct patient on fluid and nutrition as appropriate  Outcome: Progressing  Goal: Fluid balance maintained  Description: INTERVENTIONS:  - Monitor labs   - Monitor I/O and WT  - Instruct patient on fluid and nutrition as appropriate  - Assess for signs & symptoms of volume excess or deficit  Outcome: Progressing

## 2021-06-09 NOTE — ASSESSMENT & PLAN NOTE
Lab Results   Component Value Date    HGBA1C 8 6 (H) 02/20/2020       Recent Labs     06/09/21  0605 06/09/21  1155 06/09/21  1300 06/09/21  1311   POCGLU 220* 87 153* 166*       Blood Sugar Average: Last 72 hrs:  (P) 175 5     BS labile - received a 1/2 amp of D50 during rapid response  Will decrease lantus to 20 units  Sliding scale insulin

## 2021-06-09 NOTE — PROGRESS NOTES
Responded to rapid response and then the stroke alert  Patient was in the dialysis with medical team  Patient taken to the CT Scan  Then went up to her room  A man was in the room patient upon arrival to the her room  Introduced self to family  Accompanied to patient to CT and The her room  Provided supportive silent presence and emotional support to the patient  Patient was tearful sometimes  She has some emotional difficulties and getting upset by her friend  Patient's friend  argued with patient about her daughter, to the dismay of the patient   expressed to the patient and the family if they need any support from pastoral care they can call anytime in 24/7 hours  06/09/21 1400   Clinical Encounter Type   Visited With Patient; Family; Health care provider   Routine Visit Introduction   Crisis Visit Code  (Rapid response and stroke alert)   Referral From Nurse

## 2021-06-09 NOTE — RAPID RESPONSE
Rapid Response Note  Payton Blackwell 49 y o  female MRN: [id number]  Unit/Bed#: -01 Encounter: [id number]    Rapid Response Notification(s):   Response called date/time:  6/9/2021 12:38 PM  Response team arrival date/time:  6/9/2021 11:56 AM  Response end date/time:  6/9/2021 12:20 PM  Rapid response location:  Other (comment) (Inpatient Dialysis)  Primary reason for rapid response call:  Acute change in neuro status    Rapid Response Intervention(s):   Airway:  None  Breathing:  None  Circulation:  None  Fluids administered:  None  Medications administered:  D50       Background/Situation:   Payton Blackwell is a 49 y o  female who developed speech difficulties following IHD  She was noted to have garbled speech and possibly a facial droop  Stroke alert was called  Blood glucose was 87 at that time  She was given dextrose and had some improvement in her speech though still had some speech difficulties  Neurology team came to evaluate and patient underwent CT scans per stroke alert protocol  Hemodynamics stable at that time  Review of Systems   Respiratory: Negative for shortness of breath  Cardiovascular: Negative for chest pain  Gastrointestinal: Negative for abdominal pain and nausea  Neurological: Positive for speech difficulty  Objective:   Vitals:    06/09/21 0930 06/09/21 1000 06/09/21 1030 06/09/21 1100   BP: 167/61 (!) 177/96 (!) 177/92 97/65   BP Location:    Right arm   Pulse: 98 65 98 73   Resp: 20 20 20 20   Temp:    98 6 °F (37 °C)   TempSrc:    Oral   SpO2:       Weight:       Height:         Physical Exam  Skin:     General: Skin is warm and dry  Neurological:      Comments: Dysarthria  Strength 5/5 throughout   Psychiatric:         Mood and Affect: Mood is anxious           Assessment:   · Dysarthria   · Relative hypoglycemia    Plan:   · Stroke alert  · CTH/CTA head/neck  · Neuro consult   · Not a candidate for tPA given anticoagulation   · Adjust insulin regimen given very labile blood sugars     Rapid Response Outcome:   Condition:  In stable condition  Progression:  Improving  Transfer:  Remain on floor  Code Status: Level 1 (Full Code)    Comments:  Notified Dr Gino Yanez of events via tigerconnect       Portions of the record may have been created with voice recognition software  Occasional wrong word or "sound a like" substitutions may have occurred due to the inherent limitations of voice recognition software  Read the chart carefully and recognize, using context, where substitutions have occurred      Graciela Chou PA-C

## 2021-06-09 NOTE — PROGRESS NOTES
As requested during afternoon report, I followed up with patient  Patient reported that she was feeling much better and thanked me for checking on her        06/09/21 1900   Plan of Care   Assessment Completed by: Unit visit

## 2021-06-09 NOTE — PROGRESS NOTES
[facility] [facility]  Progress Note Emir Solano 1965, 49 y o  female MRN: [id number]  Unit/Bed#: -01 Encounter: [id number]  Primary Care Provider: Jose Alfredo Oquendo MD   Date and time admitted to hospital: 6/8/2021  7:07 AM    * Stroke-like symptoms  Assessment & Plan  · Stroke alert called during HD today  · Patient developed slurred speech, possible facial droop, and left sided drift and ataxia  · Neurology input appreciated  · Meningioma noted on CT head  · Check EEG to R/O seizure  · Tele and echo  · PT/OT/speech  · Neuro checks  · Coumadin therapeutic (DVT history)    Chest pain  Assessment & Plan  · Presented with chest pain  · Likely secondary to fluid overload  Patient recieved urgent dialysis  · Serial cardiac enzymes - negative x 3  · Correct electrolytes  · CTA chest - no PE    Hyperkalemia  Assessment & Plan  · Resolved with dialysis  · Dietary indiscretion  ESRD on hemodialysis [facility])  Assessment & Plan  · T/Th/Sat HD  · Extra UF today secondary to volume overload  · Nephrology following    Type 2 diabetes mellitus [facility])  Assessment & Plan  Lab Results   Component Value Date    HGBA1C 8 6 (H) 02/20/2020       Recent Labs     06/09/21  0605 06/09/21  1155 06/09/21  1300 06/09/21  1311   POCGLU 220* 87 153* 166*       Blood Sugar Average: Last 72 hrs:  (P) 175 5     BS labile - received a 1/2 amp of D50 during rapid response  Will decrease lantus to 20 units  Sliding scale insulin    Hypertension  Assessment & Plan  · Labile  · Dropped to 118 on HD today  · Losartan stopped by Nephrology secondary to hyperkalemia          VTE Pharmacologic Prophylaxis:   High Risk (Score >/= 5) - Pharmacological DVT Prophylaxis Ordered: warfarin (Coumadin)  Sequential Compression Devices Ordered  Patient Centered Rounds: I performed bedside rounds with nursing staff today    Discussions with Specialists or Other Care Team Provider: case management, Nephrology, Neurology, Critical Care    Education and Discussions with Family / Patient: Updated  (significant other) at bedside  Time Spent for Care: 30 minutes  More than 50% of total time spent on counseling and coordination of care as described above  Current Length of Stay: 1 day(s)  Current Patient Status: Inpatient   Certification Statement: The patient will continue to require additional inpatient hospital stay due to Neurology workup  Discharge Plan: Anticipate discharge in 24-48 hrs to home  Code Status: Level 1 - Full Code    Subjective:   Rapid response called in hemodialysis today  Patient was noted to have abnormal speech, facial droop, and ataxia  Patient seen after dialysis  She states her symptoms have improved, but she is very tearful anxious  Objective:     Vitals:   Temp (24hrs), Av 3 °F (36 8 °C), Min:97 6 °F (36 4 °C), Max:98 6 °F (37 °C)    Temp:  [97 6 °F (36 4 °C)-98 6 °F (37 °C)] 98 6 °F (37 °C)  HR:  [65-98] 73  Resp:  [16-20] 20  BP: ()/() 97/65  SpO2:  [95 %-100 %] 95 %  Body mass index is 45 03 kg/m²  Input and Output Summary (last 24 hours): Intake/Output Summary (Last 24 hours) at 2021 1359  Last data filed at 2021 1058  Gross per 24 hour   Intake 1950 ml   Output 6846 ml   Net -4896 ml       Physical Exam:   Physical Exam  Constitutional:       Appearance: Normal appearance  Cardiovascular:      Rate and Rhythm: Normal rate and regular rhythm  Heart sounds: No murmur  Pulmonary:      Effort: Pulmonary effort is normal       Breath sounds: Normal breath sounds  Abdominal:      General: Bowel sounds are normal  There is no distension  Palpations: Abdomen is soft  Tenderness: There is no abdominal tenderness  Skin:     General: Skin is warm and dry  Neurological:      General: No focal deficit present  Mental Status: She is alert and oriented to person, place, and time     Psychiatric:         Mood and Affect: Mood normal          Additional Data:     Labs:  Results from last 7 days   Lab Units 06/09/21  0517   WBC Thousand/uL 7 03   HEMOGLOBIN g/dL 8 9*   HEMATOCRIT % 27 2*   PLATELETS Thousands/uL 125*   NEUTROS PCT % 79*   LYMPHS PCT % 10*   MONOS PCT % 8   EOS PCT % 2     Results from last 7 days   Lab Units 06/09/21  0517   SODIUM mmol/L 133*   POTASSIUM mmol/L 5 0   CHLORIDE mmol/L 97*   CO2 mmol/L 30   BUN mg/dL 44*   CREATININE mg/dL 6 38*   ANION GAP mmol/L 6   CALCIUM mg/dL 8 2*   GLUCOSE RANDOM mg/dL 241*     Results from last 7 days   Lab Units 06/09/21  0517   INR  2 46*     Results from last 7 days   Lab Units 06/09/21  1311 06/09/21  1300 06/09/21  1155 06/09/21  0605 06/08/21  2046 06/08/21  1620 06/08/21  1400 06/08/21  0917   POC GLUCOSE mg/dl 166* 153* 87 220* 330* 133 94 221*               Lines/Drains:  Invasive Devices     Peripheral Intravenous Line            Peripheral IV 06/09/21 Right;Ventral (anterior) Forearm less than 1 day          Line            Hemodialysis AV Fistula 02/20/18 Left Forearm 1204 days                  Telemetry:  Telemetry Orders (From admission, onward)             48 Hour Telemetry Monitoring  Continuous x 48 hours     Question:  Reason for 48 Hour Telemetry  Answer:  Acute CVA (<24 hrs old, hemispheric strokes, selected brainstem strokes, cardiac arrhythmias)                 Telemetry Reviewed: Normal Sinus Rhythm  Indication for Continued Telemetry Use: Acute CVA           Imaging: Reviewed radiology reports from this admission including: CT head and CTA head and neck    Recent Cultures (last 7 days):         Last 24 Hours Medication List:   Current Facility-Administered Medications   Medication Dose Route Frequency Provider Last Rate    acetaminophen  650 mg Oral Q6H PRN Hetul Andrea, DO      albuterol  2 puff Inhalation Q6H PRN Hetul Andrea, DO      ALPRAZolam  0 25 mg Oral BID PRN Hetul Andrea, DO      atorvastatin  20 mg Oral QPM Hetul Andrea, DO      atropine  1 drop Both Eyes HS Hetul Andrea, DO      brimonidine  1 drop Both Eyes BID Hetul Andrea, DO      carvedilol  25 mg Oral BID With Meals Hetul Andrea, DO      cinacalcet  30 mg Oral Daily Hetul Andrea, DO      dorzolamide-timolol  1 drop Both Eyes BID Hetul Andrea, DO      gabapentin  300 mg Oral HS Hetul Andrea, DO      insulin glargine  20 Units Subcutaneous HS Vanessa Veliz PA-C      insulin lispro  3 Units Subcutaneous TID With Meals Hetul Andrea, DO      latanoprost  1 drop Both Eyes HS Hetul Andrea, DO      levothyroxine  50 mcg Oral Daily Hetul Andrea, DO      lidocaine  1 patch Topical Daily [facility]      lidocaine  1 patch Topical Daily Aleksandra Mon PA-C      loratadine  10 mg Oral Daily [facility]      LORazepam  1 mg Oral Once in imaging Hetul [place] Che[place], DO      nystatin   Topical BID Hetul Andrea, DO      ondansetron  4 mg Oral Q4H PRN [facility], [facility]      pantoprazole  40 mg Oral Early Morning Hetul Andrea, DO      prednisoLONE acetate  1 drop Both Eyes 4x Daily Hetul Andrea, DO      senna  2 tablet Oral HS PRN [facility]      sertraline  50 mg Oral Daily [facility], DO      sevelamer  800 mg Oral TID With Meals [facility], DO      torsemide  100 mg Oral Q12H [facility], [facility]      traMADol  50 mg Oral BID PRN Aleksandra Mon PA-C      warfarin  9 mg Oral Daily (warfarin) [facility], [facility]          Today, Patient Was Seen By: Sam MondayLILLIANA    **Please Note: This note may have been constructed using a voice recognition system  **

## 2021-06-09 NOTE — ASSESSMENT & PLAN NOTE
Josef William is a 49 y o  female with ESRD on dialysis, DM type 2, h/o DVT/PE on Coumadin who initially was admitted with chest pain  After dialysis on 6/9/2021 a RRT was called for acute onset of aphasia and left facial droop followed by a stroke alert at 1159  NIHSS 6 for speech difficulties, slight left facial droop, LUE/LLE drift, LUE ataxia, and neglect in RUE/RLE  Blood glucose 87 and   · CTA head and neck with no LVO or hemodynamically critical stenosis  · CT head completed with no acute intracranial pathology but demonstrating 1 4 cm paramedian left frontal vertex meningioma  · No IV tPa given due to bleeding risk  Patient on coumadin with today's INR 2 46    · Routine EEG: occasional left temporal delta slowing and slowing of the posteriorly dominant rhythm suggestive of mild nonspecific diffuse cerebral dysfunction, but no epileptiform discharges  · MRI brain:   · No acute infarct  · Nonspecific periventricular and subcortical white matter FLAIR hyperintense foci favoring precocious microangiopathy  · Numerous foci of gradient susceptibility blooming signal involving bilateral cerebral hemispheres likely representing foci of chronic microhemorrhage which can be seen in cerebral amyloid angiopathy  · Parasagittal left frontal lobe 15 mm meningioma   · Hemoglobin A1c 8 4    Patient reports chronic Right eyes visual deficits affecting all quadrants  Reports symptoms she experienced at the time of the stroke alert resolved, attributing it to "blood sugar and blood pressure were low" during the event  Strong suspicion for dialysis related hypotension  Plan:   - Continue warfarin  - Continue home Lipitor 20 mg daily  - Telemetry monitoring for arrhythmia  - Normotensive goal; avoid hypotension  - Maintain euglycemia and normothermia  - PT/OT/ST  - Frequent neurological checks, Stat CT head with acute decline of in exam/mental status; Notify neurology with any changes in examination

## 2021-06-09 NOTE — PROGRESS NOTES
NEPHROLOGY PROGRESS NOTE   Castro Oquendo 49 y o  female MRN: [id number]  Unit/Bed#: -01 Encounter: [id number]    Hemodialysis procedure note:  Patient was seen at 9:15 a m  On hemodialysis potassium is improved using a 2 K bath and removing 2 L    ASSESSMENT & PLAN    1  End-stage kidney disease on hemodialysis Tuesday Thursday Saturday at [address]-Tuesday Thursday Saturday schedule required urgent dialysis     2  Hyperkalemia-this has improved required a 2 potassium bath for 2-1/2 hours and a 1 potassium bath for 1-1/2 hours, now on a 2 potassium bath     3  Chest pain CT a negative for PE and currently stable      4  Volume overload-challenging her dry weight blood pressure stable     5  Anemia of chronic kidney disease-no MITCHELL with a history of a PE     6  CKD bone mineral disease-continue Hectorol 2 mcg with dialysis, Sensipar 30 mg daily, Renvela 3 tablets with meals and 2 with snacks     DISCUSSION    Overall she is stable potassium is improved, volume status has improved    We stressed the importance of completing full treatments at dialysis and low-potassium diet as her potassium level could have been lethal   She understood and had no further questions    Okay for discharge from a renal standpoint when otherwise medically stable    SUBJECTIVE:    Patient was seen today  No chest pain or shortness of breath no fevers or chills    OBJECTIVE:  Current Weight: Weight - Scale: 127 kg (278 lb 15 7 oz)    Vitals:    06/09/21 0850 06/09/21 0900 06/09/21 0930 06/09/21 1000   BP: (!) 124/107 122/59 167/61 (!) 177/96   BP Location: Right arm      Pulse: 68 80 98 65   Resp: 20 20 20 20   Temp: 98 6 °F (37 °C)      TempSrc: Oral      SpO2:       Weight:       Height:           Intake/Output Summary (Last 24 hours) at 6/9/2021 1026  Last data filed at 6/9/2021 0900  Gross per 24 hour   Intake 1850 ml   Output 3841 ml   Net -1991 ml     Weight (last 2 days)     Date/Time   Weight    06/08/21 16:22:18   127 (278 98)    06/08/21 0734   127 (279)              General: conscious, cooperative, in no acute distress  Eyes: conjunctivae pink, anicteric sclerae  ENT: lips and mucous membranes moist  Neck: supple, no JVD  Chest: no respiratory distress, no accessory muscle use, normal respiratory effort  CVS: normal heart rate, no friction rub  Abdomen: soft, non-tender, non-distended, normoactive bowel sounds  Extremities: no edema of both legs  Skin: no rash  Neuro: awake, alert, oriented      Medications:    Current Facility-Administered Medications:     acetaminophen (TYLENOL) tablet 650 mg, 650 mg, Oral, Q6H PRN, Hetul Andrea, DO, 650 mg at 06/08/21 2201    albuterol (PROVENTIL HFA,VENTOLIN HFA) inhaler 2 puff, 2 puff, Inhalation, Q6H PRN, Hetul Andrea, DO    ALPRAZolam (XANAX) tablet 0 25 mg, 0 25 mg, Oral, BID PRN, Hetul Andrea, DO, 0 25 mg at 06/09/21 0039    atorvastatin (LIPITOR) tablet 20 mg, 20 mg, Oral, QPM, Hetul Andrea, DO, 20 mg at 06/08/21 1720    atropine (ISOPTO ATROPINE) 1 % ophthalmic solution 1 drop, 1 drop, Both Eyes, HS, Hetul Andrea, DO, 1 drop at 06/08/21 2157    brimonidine (ALPHAGAN P) 0 15 % ophthalmic solution 1 drop, 1 drop, Both Eyes, BID, Hetul Andrea, DO, 1 drop at 06/09/21 0815    carvedilol (COREG) tablet 25 mg, 25 mg, Oral, BID With Meals, Hetul Andrea, DO, 25 mg at 06/09/21 0725    cinacalcet (SENSIPAR) tablet 30 mg, 30 mg, Oral, Daily, Hetul Andrea, DO, 30 mg at 06/09/21 0812    dorzolamide-timolol (COSOPT) 22 3-6 8 MG/ML ophthalmic solution 1 drop, 1 drop, Both Eyes, BID, Hetul Andrea, DO, 1 drop at 06/09/21 0815    gabapentin (NEURONTIN) capsule 300 mg, 300 mg, Oral, HS, Hetul Andrea, DO, 300 mg at 06/08/21 2156    insulin glargine (LANTUS) subcutaneous injection 30 Units 0 3 mL, 30 Units, Subcutaneous, HS, Hetul Andrea, DO, 30 Units at 06/08/21 2156    insulin lispro (HumaLOG) 100 units/mL subcutaneous injection 3 Units, 3 Units, Subcutaneous, TID With Meals, Farhad Andrea DO, 3 Units at 06/09/21 0724    latanoprost (XALATAN) 0 005 % ophthalmic solution 1 drop, 1 drop, Both Eyes, HS, Hetul Andrea, DO, 1 drop at 06/08/21 2157    levothyroxine tablet 50 mcg, 50 mcg, Oral, Daily, Hetul Andrea, DO, 50 mcg at 06/09/21 0813    lidocaine (LIDODERM) 5 % patch 1 patch, 1 patch, Topical, Daily, Hetul Andrea, DO, Stopped at 06/09/21 0816    lidocaine (LIDODERM) 5 % patch 1 patch, 1 patch, Topical, Daily, Raffaele Gillette PA-C, Stopped at 06/09/21 1013    loratadine (CLARITIN) tablet 10 mg, 10 mg, Oral, Daily, Hetul Andrea, DO, 10 mg at 06/09/21 0813    LORazepam (ATIVAN) tablet 1 mg, 1 mg, Oral, Once in imaging, Hetul Andrea, DO    nystatin (MYCOSTATIN) powder, , Topical, BID, Hetul Andrea, DO, Given at 06/09/21 0934    ondansetron (ZOFRAN-ODT) dispersible tablet 4 mg, 4 mg, Oral, Q4H PRN, Hetul Andrea, DO    pantoprazole (PROTONIX) EC tablet 40 mg, 40 mg, Oral, Early Morning, Hetul Andrea, DO, 40 mg at 06/09/21 0524    prednisoLONE acetate (PRED FORTE) 1 % ophthalmic suspension 1 drop, 1 drop, Both Eyes, 4x Daily, Hetul Andrea, DO, 1 drop at 06/09/21 0815    senna (SENOKOT) tablet 17 2 mg, 2 tablet, Oral, HS PRN, Hetul Andrea, DO    sertraline (ZOLOFT) tablet 50 mg, 50 mg, Oral, Daily, Hetul Andrea, DO, 50 mg at 06/09/21 0812    sevelamer (RENAGEL) tablet 800 mg, 800 mg, Oral, TID With Meals, Hetul Andrea, DO, 800 mg at 06/09/21 0724    torsemide (DEMADEX) tablet 100 mg, 100 mg, Oral, Q12H, Hetul Andrea, DO, 100 mg at 06/09/21 0524    traMADol (ULTRAM) tablet 50 mg, 50 mg, Oral, BID PRN, Raffaele Gillette PA-C, 50 mg at 06/09/21 0038    warfarin (COUMADIN) tablet 9 mg, 9 mg, Oral, Daily (warfarin), Farhad Andrea DO, 9 mg at 06/08/21 1820    Invasive Devices:      Lab Results:   Results from last 7 days   Lab Units 06/09/21  0517 06/08/21  0759   WBC Thousand/uL 7 03 6 76   HEMOGLOBIN g/dL 8 9* 9 0*   HEMATOCRIT % 27 2* 27 6*   PLATELETS Thousands/uL 125* 147*   POTASSIUM mmol/L 5 0 7 8*   CHLORIDE mmol/L 97* 104   CO2 mmol/L 30 26   BUN mg/dL 44* 95*   CREATININE mg/dL 6 38* 10 30*   CALCIUM mg/dL 8 2* 8 3         Portions of the record may have been created with voice recognition software  Occasional wrong word or "sound a like" substitutions may have occurred due to the inherent limitations of voice recognition software  Read the chart carefully and recognize, using context, where substitutions have occurred  If you have any questions, please contact the dictating provider

## 2021-06-09 NOTE — CONSULTS
Consultation - Stroke   Noa Jackson 49 y o  female MRN: [id number]  Unit/Bed#: -01 Encounter: [id number]      Assessment/Plan     Stroke-like symptoms  Assessment & Plan  48 yo female with ESRD on dialysis, DM type 2, h/o DVT/PE on Coumadin who initially was admitted with chest pain  Today (6/9/21), after dialysis RRT called for acute onset of aphasia and left facial droop followed by a stroke alert at 1159  NIHSS 6 for speech difficulties, slight left facial droop, LUE/LLE drift, LUE ataxia, and right sided neglect in RUE/RLE  Blood glucose 87 and   · CTA head and neck with no LVO or hemodynamically critical stenosis  · CT head completed with no acute intracranial pathology but demonstrating "1 4 cm paramedian left frontal vertex meningioma"  · No IV tPa given due to bleeding risk  Patient on coumadin with today's INR 2 46    Plan:   - Recommend Routine EEG  - continue stroke pathway  - MRI brain without contrast   - Echocardiogram  - Telemetry monitoring for arrhythmia  - Lipid panel, HgA1C, TSH   - continue warfarin  - continue statin  - permissive hypertension; avoid hypotension  - maintain euglycemia and normothermia  - PT/OT/ST  - Frequent neurological checks, Stat CT head with acute decline of in exam/mental status; Notify neurology with any changes in examination  TPA Decision: Patient not a TPA candidate  Bleeding risk  patient on coumadin, INR 2 46      Recommendations for outpatient neurological follow up have yet to be determined  History of Present Illness     Reason for Consult / Principal Problem: stroke like symptoms: aphasia and left facial weakness  Hx and PE limited by: none  Patient last known well: 1230  Stroke alert called: 3042  Neurology time of arrival: 1159  HPI: Noa Jackson is a 49 y o   female with ESRD on dialysis, DM type 2, anxiety who initially presented to ED on 6/8/21 with chest discomfort described as chest pressure and shortness of breath   She was found to be hyperkalemic (K+ 7 8) with fluid overload prompting emergent dialysis  Today, after dialysis completed, patient with acute onset of garbled speech and noted to have left facial droop  BG 87 and   Rapid response was called followed by a stroke alert  NIHSS 6 for aphasia, trace left facial droop, LUE/LLE drift, LUE ataxia and right sided neglect  Consults    Review of Systems   Constitutional:        Tearful   HENT: Negative  Eyes:        Chronic vision problems related to DM   Respiratory: Negative  Cardiovascular: Negative  Gastrointestinal: Negative  Genitourinary:        ESRD     Musculoskeletal: Negative  Skin: Negative  Neurological: Positive for speech difficulty and weakness (left sided heaviness)     Psychiatric/Behavioral:        Anxiety       Historical Information   Past Medical History:   Diagnosis Date    Abnormal uterine bleeding (AUB)     Anxiety     Arthritis     Chronic kidney disease     Claustrophobia     Diabetes mellitus ([facility]     Disease of thyroid gland     DVT (deep venous thrombosis) (HCC)     End stage kidney disease (HCC)     Foot ulcer due to secondary DM ([facility] )     Hemodialysis patient ([facility] )     Tues, Thurs, Sat    Hypertension     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[facility] )     right eye    Morbid obesity (HCC)     Osteomyelitis of fifth toe of right foot (HCC)     Panic attacks     Pulmonary embolism (HCC)     Reflux esophagitis     Thrombophlebitis of saphenous vein     Warfarin anticoagulation      Past Surgical History:   Procedure Laterality Date    AMPUTATION      r 4th toe    ARTERIOVENOUS GRAFT PLACEMENT      CATARACT EXTRACTION Bilateral     CERVICAL BIOPSY  W/ LOOP ELECTRODE EXCISION       SECTION      DIALYSIS FISTULA CREATION      DILATION AND CURETTAGE OF UTERUS      ENDOMETRIAL ABLATION W/ NOVASURE      EYE SURGERY      HYSTERECTOMY      @ age 57 (complete)    IR AV FISTULAGRAM/GRAFTOGRAM 10/11/2019    IR AV FISTULAGRAM/GRAFTOGRAM  8/12/2020    IR AV FISTULAGRAM/GRAFTOGRAM  12/23/2020    OOPHORECTOMY Bilateral     @ age 57    PERICARDIUM SURGERY      AL AMPUTATION TOE,MT-P JT Right 5/28/2020    Procedure: AMPUTATION TOE- 5th;  Surgeon: Tc Angel DPM;  [facility];  Service: Podiatry    AL COLONOSCOPY FLX DX W/COLLJ [address] PFRMD N/A 3/13/2019    Procedure: COLONOSCOPY;  Surgeon: Brenda Rowe MD;  [facility]; Service: Gastroenterology    AL ESOPHAGOGASTRODUODENOSCOPY TRANSORAL DIAGNOSTIC N/A 3/13/2019    Procedure: ESOPHAGOGASTRODUODENOSCOPY (EGD); Surgeon: Brenda Rowe MD;  [facility]; Service: Gastroenterology    AL LAPAROSCOPY W TOT HYSTERECT UTERUS 250 GRAM OR LESS N/A 2/16/2016    Procedure: HYSTERECTOMY LAPAROSCOPIC TOTAL ([address]), with bilateral salpingectomy;  Surgeon: Nitza Castro DO;  [facility];  Service: Gynecology    THROMBECTOMY / ARTERIOVENOUS GRAFT REVISION      TOE SURGERY Right     removal of the 4th toe     Social History   Social History     Substance and Sexual Activity   Alcohol Use Never    Alcohol/week: 0 0 standard drinks    Frequency: Never    Drinks per session: Patient refused    Binge frequency: Patient refused     Social History     Substance and Sexual Activity   Drug Use No     E-Cigarette/Vaping    E-Cigarette Use Never User      E-Cigarette/Vaping Substances    Nicotine No     THC No     CBD No     Flavoring No     Other No     Unknown No      Social History     Tobacco Use   Smoking Status Never Smoker   Smokeless Tobacco Never Used     Family History:   Family History   Problem Relation Age of Onset    Heart disease Family     Diabetes Family     Heart disease Mother     Cancer Brother         neck    Throat cancer Brother     Ovarian cancer Maternal Aunt 40       Review of previous medical records was  completed       Meds/Allergies   all current active meds have been reviewed and PTA meds: Prior to Admission Medications   Prescriptions Last Dose Informant Patient Reported? Taking? ALPRAZolam (XANAX) 0 25 mg tablet  Self Yes No   Sig: Take 0 25 mg by mouth 2 (two) times a day as needed for anxiety   ALPRAZolam (XANAX) 0 5 mg tablet   Yes No   Sig: TAKE 1/2 TO 1 TABLET BY MOUTH EVERY 4 HOURS AS NEEDED FOR ANXIETY  Accu-Chek Esther Plus test strip   Yes No   Sig: 3 (three) times a day Use to test blood sugar   Accu-Chek Softclix Lancets lancets   Yes No   Sig: 3 (three) times a day Use to test blood sugar   B Complex-C-Folic Acid (TRIPHROCAPS) 1 MG CAPS  Self No No   Sig: Take 1 capsule (1 mg total) by mouth daily   Banophen 50 MG capsule   Yes No   CALCIUM CARBONATE PO  Self Yes No   Sig: Take 1,000 mg by mouth daily     Diclofenac Sodium (Voltaren) 1 %   No No   Sig: Apply 2 g topically 4 (four) times a day   KIONEX 15 GM/60ML suspension  Self Yes No   Sig: Take by mouth Takes as a shake on dialysis days Tues-Thurs_Sat   LORazepam (ATIVAN) 1 mg tablet   No No   Sig: Take 1 tablet (1 mg total) by mouth once in imaging for anxiety for up to 1 dose   Melatonin 3 MG CAPS  Self Yes No   Sig: Take 10 mg by mouth daily at bedtime     NOVOLOG FLEXPEN 100 units/mL SOPN   Yes No   Sig: INJECT 1 TO 5 UNITS SUBCUTANEOUSLY THREE TIMES A DAY BEFORE MELAS AS PER SLIDING SCALE   Podiatric Products (Flexitol Heel Balm) OINT   No No   Sig: Apply topically once for 1 dose   TIMOLOL MALEATE OP  Self Yes No   Sig: Apply 1 drop to eye 2 (two) times a day   acetaminophen (TYLENOL) 325 mg tablet   No No   Sig: Take 2 tablets (650 mg total) by mouth every 6 (six) hours as needed for mild pain or fever   albuterol (ProAir HFA) 90 mcg/act inhaler   No No   Sig: Inhale 2 puffs every 6 (six) hours as needed for wheezing or shortness of breath   atorvastatin (LIPITOR) 20 mg tablet  Self Yes No   Sig: Take 20 mg by mouth every evening    atropine (ISOPTO ATROPINE) 1 % ophthalmic solution   Yes No   Sig: Administer 1 drop to both eyes daily at bedtime    brimonidine (ALPHAGAN P) 0 15 % ophthalmic solution  Self Yes No   Sig: Administer 1 drop to both eyes 2 (two) times a day    carvedilol (COREG) 12 5 mg tablet   Yes No   Sig: TAKE 1 TABLET BY MOUTH ONCE DAILY EXCEPT DIALYSIS DAYS    carvedilol (COREG) 25 mg tablet  Self Yes No   Sig: Take 25 mg by mouth 2 (two) times a day with meals     cinacalcet (SENSIPAR) 30 mg tablet  Self Yes No   Sig: Take 30 mg by mouth daily   diclofenac sodium (VOLTAREN) 1 %   No No   Sig: Apply 2 g topically 4 (four) times a day   diphenhydrAMINE (BENADRYL) 25 mg capsule  Self Yes No   Sig: Take by mouth as needed for itching     dorzolamide-timolol (COSOPT) 22 3-6 8 MG/ML ophthalmic solution  Self Yes No   Sig: instill 1 drop into both eyes twice a day   ergocalciferol (VITAMIN D2) 50,000 units   Yes No   Sig: take 1 capsule by mouth every week FOR 12 WEEKS   gabapentin (NEURONTIN) 100 mg capsule   No No   Sig: Take 3 capsules (300 mg total) by mouth daily at bedtime   insulin glargine (LANTUS) 100 units/mL subcutaneous injection  Self No No   Sig: Inject 30 Units under the skin daily at bedtime   latanoprost (XALATAN) 0 005 % ophthalmic solution  Self Yes No   Sig: Administer 1 drop to both eyes daily at bedtime   levothyroxine 50 mcg tablet  Self Yes No   Sig: Take 50 mcg by mouth daily   lidocaine (LIDODERM) 5 %   Yes No   Sig: APPLY 1 PATCH BY TRANDERMAL ROUTE EVERY DAY (MAY WEAR UP TO 12 HOURS)     loratadine (CLARITIN) 10 mg tablet   Yes No   Sig: Take 10 mg by mouth daily   losartan (COZAAR) 25 mg tablet  Self No No   Sig: Take 1 tablet (25 mg total) by mouth daily Take on NON-Dialysis Days   methazolamide (NEPTAZANE) 25 MG tablet  Self Yes No   Sig: Take 25 mg by mouth 3 (three) times a day     methocarbamol (ROBAXIN) 500 mg tablet   No No   Sig: Take 1 tablet (500 mg total) by mouth every 8 (eight) hours as needed for muscle spasms   mupirocin (BACTROBAN) 2 % ointment   No No   Sig: Apply topically 3 (three) times a day   nystatin (MYCOSTATIN) powder   No No   Sig: Apply topically 2 (two) times a day   ondansetron (ZOFRAN) 4 mg tablet  Self No No   Sig: Take 1 tablet (4 mg total) by mouth every 8 (eight) hours as needed for nausea or vomiting   pantoprazole (PROTONIX) 40 mg tablet   No No   Sig: Take 1 tablet (40 mg total) by mouth daily in the early morning   prednisoLONE acetate (PRED FORTE) 1 % ophthalmic suspension  Self Yes No   Sig: Administer 1 drop to both eyes 4 (four) times a day Patient takes only occasionally   pregabalin (LYRICA) 25 mg capsule   No No   Sig: Take 1 PO daily   Please take an additional capsule after dialysis   senna (SENOKOT) 8 6 mg   No No   Sig: Take 2 tablets (17 2 mg total) by mouth daily at bedtime as needed for constipation   sertraline (ZOLOFT) 50 mg tablet  Self No No   Sig: Take 1 tablet (50 mg total) by mouth daily   sevelamer carbonate (RENVELA) 800 mg tablet   Yes No   Sig: TAKE 3 TABLETS BY MOUTH THREE TIMES A DAY WITH MEALS AND 2 TABLETS WITH SNACKS    tobramycin-dexamethasone (TOBRADEX) ophthalmic suspension   No No   Sig: Administer 1 drop into the left eye every 4 (four) hours while awake   torsemide (DEMADEX) 100 mg tablet  Self Yes No   Sig: Take 100 mg by mouth every 12 (twelve) hours    traMADol (ULTRAM) 50 mg tablet Not Taking at Unknown time  No No   Sig: Take 1 tablet (50 mg total) by mouth every 12 (twelve) hours as needed for moderate pain for up to 10 doses   Patient not taking: Reported on 5/6/2021   warfarin (COUMADIN) 2 mg tablet   No No   Sig: Take a 2 mg tablet in addition to a 10 mg tablet for a total of 12 mg daily until INR is therapeutic   Patient taking differently: 12 mg On Sunday ONLY   warfarin (COUMADIN) 3 mg tablet   Yes No   Sig: Take 9 mg by mouth daily Takes 9 mg Monday Sat      Facility-Administered Medications: None       Allergies   Allergen Reactions    Iodinated Diagnostic Agents Itching       Objective   Vitals:Blood pressure 97/65, pulse 73, temperature 98 6 °F (37 °C), temperature source Oral, resp  rate 20, height 5' 6" (1 676 m), weight 127 kg (278 lb 15 7 oz), last menstrual period 02/12/2016, SpO2 98 %, not currently breastfeeding  ,Body mass index is 45 03 kg/m²  Intake/Output Summary (Last 24 hours) at 6/9/2021 1220  Last data filed at 6/9/2021 1058  Gross per 24 hour   Intake 1950 ml   Output 6846 ml   Net -4896 ml       Invasive Devices: Invasive Devices     Peripheral Intravenous Line            Peripheral IV 06/09/21 Right;Ventral (anterior) Forearm less than 1 day          Line            Hemodialysis AV Fistula 02/20/18 Left Forearm 1204 days                Physical Exam  Vitals signs reviewed  HENT:      Head: Normocephalic and atraumatic  Nose: No congestion  Mouth/Throat:      Mouth: Mucous membranes are moist       Pharynx: No oropharyngeal exudate  Eyes:      General:         Right eye: No discharge  Left eye: No discharge  Extraocular Movements: Extraocular movements intact and EOM normal       Conjunctiva/sclera: Conjunctivae normal       Pupils: Pupils are equal, round, and reactive to light  Neck:      Musculoskeletal: Normal range of motion  Cardiovascular:      Rate and Rhythm: Normal rate and regular rhythm  Heart sounds: Normal heart sounds  No murmur  Comments: AV fistula left arm  Pulmonary:      Effort: No respiratory distress  Breath sounds: Normal breath sounds  Abdominal:      General: Bowel sounds are normal  There is no distension  Palpations: Abdomen is soft  Musculoskeletal: Normal range of motion  Skin:     General: Skin is warm and dry  Neurological:      Mental Status: She is alert  Neurologic Exam     Mental Status   Oriented to person  Oriented to place  Oriented to date  Attention: normal  Concentration: normal    Speech: (Non-fluent, slow)  Level of consciousness: alert  Able to name object  Able to repeat       Cranial Nerves CN II   Visual acuity: (chronic vision difficulties associated with diabetes)    CN III, IV, VI   Pupils are equal, round, and reactive to light  Extraocular motions are normal    Nystagmus: none   Diplopia: none    CN V   Facial sensation intact  CN VII   Left facial weakness: flattening of nasal labial fold  CN VIII   Hearing: intact    CN XII   Tongue deviation: none      NIHSS:  1a Level of Consciousness: 0 = Alert   1b  LOC Questions: 0 = Answers both correctly   1c  LOC Commands: 0 = Obeys both correctly   2  Best Gaze: 0 = Normal   3  Visual: 0 = No visual field loss   4  Facial Palsy: 1=Minor paralysis (flattened nasolabial fold, asymmetric on smiling)   5a  Motor Right Arm: 0=No drift, limb holds 90 (or 45) degrees for full 10 seconds   5b  Motor Left Arm: 1=Drift, limb holds 90 (or 45) degrees but drifts down before full 10 seconds: does not hit bed   6a  Motor Right Le=No drift, limb holds 90 (or 45) degrees for full 10 seconds   6b  Motor Left Le=Drift, limb holds 90 (or 45) degrees but drifts down before full 10 seconds: does not hit bed   7  Limb Ataxia:  1=Present in one limb   8  Sensory: 0=Normal; no sensory loss   9  Best Language:  0=No aphasia, normal   10  Dysarthria: 1=Mild to moderate, patient slurs at least some words and at worst, can be understood with some difficulty   11  Extinction and Inattention (formerly Neglect): 1=Visual, tactile, auditory, spatial or personal inattention or extinction to bilateral simultaneous stimulation in one of the sensory modalities   Total Score: 6     Time NIHSS was completed: 1215    Modified Chase Score:  0 (No baseline symptoms/disability)    Lab Results:   I have personally reviewed pertinent reports      CBC:   Results from last 7 days   Lab Units 21  0517 21  0759   WBC Thousand/uL 7 03 6 76   RBC Million/uL 2 83* 2 86*   HEMOGLOBIN g/dL 8 9* 9 0*   HEMATOCRIT % 27 2* 27 6*   MCV fL 96 97   PLATELETS Thousands/uL 125* 147*   BMP/CMP:   Results from last 7 days   Lab Units 06/09/21  0517 06/08/21  0759   SODIUM mmol/L 133* 134*   POTASSIUM mmol/L 5 0 7 8*   CHLORIDE mmol/L 97* 104   CO2 mmol/L 30 26   BUN mg/dL 44* 95*   CREATININE mg/dL 6 38* 10 30*   CALCIUM mg/dL 8 2* 8 3   EGFR ml/min/1 73sq m 7 4   Coagulation:   Results from last 7 days   Lab Units 06/09/21  0517   INR  2 46*     Imaging Studies: I have personally reviewed pertinent reports  and I have personally reviewed pertinent films in PACS     EKG, Pathology, and Other Studies: I have personally reviewed pertinent reports     and I have personally reviewed pertinent films in PACS     VTE Prophylaxis: Sequential compression device (Venodyne)     Code Status: Level 1 - Full Code

## 2021-06-09 NOTE — CASE MANAGEMENT
Pt is not a <30 day readmission or current bundle  Risk of unplanned readmission score is 37 (yellow)  Pt admitted due to stroke-like symptoms  Pt was a stroke alert this afternoon, neuro wishes to rule out seizures  As pt is in neurodiagnostics, CM spoke with pt's significant other, Keyana Gong ([phone]) to introduce CM role and begin discharge planning  Pt lives with her significant other in a 2 story house that has 4 BARBARA with a railing  Pt is reportedly independent with most ADLs but sometimes requires assistance with dressing  No use of DME for ambulation  Pt has a history of VNA with [facility], no indicated history of STR, inpatient MH treatment or D/A treatment  Pt receives SSD benefits and pt's sig other provides transportation  PCP is Dr Clinton Hernandez ([phone]) and pt uses [facility] pharmacy in [place] for prescriptions  Pt's sig other to assist with transportation at time of discharge  CM department to remain available for discharge concerns or needs  CM reviewed d/c planning process including the following: identifying help at home, patient preference for d/c planning needs, Discharge Lounge, [facility] Meds to Bed program, availability of treatment team to discuss questions or concerns patient and/or family may have regarding understanding medications and recognizing signs and symptoms once discharged  CM also encouraged patient to follow up with all recommended appointments after discharge  Patient advised of importance for patient and family to participate in managing patients medical well being

## 2021-06-09 NOTE — ASSESSMENT & PLAN NOTE
· Stroke alert called during HD today  · Patient developed slurred speech, possible facial droop, and left sided drift and ataxia  · Neurology input appreciated  · Meningioma noted on CT head  · Check EEG to R/O seizure  · Tele and echo  · PT/OT/speech  · Neuro checks  · Coumadin therapeutic (DVT history)

## 2021-06-09 NOTE — ASSESSMENT & PLAN NOTE
· Presented with chest pain  · Likely secondary to fluid overload  Patient recieved urgent dialysis    · Serial cardiac enzymes - negative x 3  · Correct electrolytes  · CTA chest - no PE

## 2021-06-09 NOTE — TREATMENT PLAN
Renal short note  Rapid response called on pt  Pt seen at bedside  Renal AP present already  Report of poor/garbled speech/aphasia and facial droop post HD   syst  BS 87       Stroke alert called and stroke team arrived  Also pt given 1/2 amp dextrose

## 2021-06-10 ENCOUNTER — APPOINTMENT (INPATIENT)
Dept: DIALYSIS | Facility: HOSPITAL | Age: 56
DRG: 640 | End: 2021-06-10
Payer: MEDICARE

## 2021-06-10 PROBLEM — R07.9 CHEST PAIN: Status: RESOLVED | Noted: 2019-05-17 | Resolved: 2021-06-10

## 2021-06-10 PROBLEM — E87.5 HYPERKALEMIA: Status: RESOLVED | Noted: 2019-08-26 | Resolved: 2021-06-10

## 2021-06-10 LAB
ANION GAP SERPL CALCULATED.3IONS-SCNC: 8 MMOL/L (ref 4–13)
BUN SERPL-MCNC: 40 MG/DL (ref 5–25)
CALCIUM SERPL-MCNC: 8.3 MG/DL (ref 8.3–10.1)
CHLORIDE SERPL-SCNC: 98 MMOL/L (ref 100–108)
CO2 SERPL-SCNC: 27 MMOL/L (ref 21–32)
CREAT SERPL-MCNC: 6.39 MG/DL (ref 0.6–1.3)
GFR SERPL CREATININE-BSD FRML MDRD: 7 ML/MIN/1.73SQ M
GLUCOSE SERPL-MCNC: 115 MG/DL (ref 65–140)
GLUCOSE SERPL-MCNC: 138 MG/DL (ref 65–140)
GLUCOSE SERPL-MCNC: 155 MG/DL (ref 65–140)
GLUCOSE SERPL-MCNC: 181 MG/DL (ref 65–140)
GLUCOSE SERPL-MCNC: 379 MG/DL (ref 65–140)
INR PPP: 1.87 (ref 0.84–1.19)
MRSA NOSE QL CULT: ABNORMAL
MRSA NOSE QL CULT: ABNORMAL
POTASSIUM SERPL-SCNC: 4.8 MMOL/L (ref 3.5–5.3)
PROTHROMBIN TIME: 21.5 SECONDS (ref 11.6–14.5)
SODIUM SERPL-SCNC: 133 MMOL/L (ref 136–145)

## 2021-06-10 PROCEDURE — 99232 SBSQ HOSP IP/OBS MODERATE 35: CPT | Performed by: PHYSICIAN ASSISTANT

## 2021-06-10 PROCEDURE — 5A1D70Z PERFORMANCE OF URINARY FILTRATION, INTERMITTENT, LESS THAN 6 HOURS PER DAY: ICD-10-PCS | Performed by: INTERNAL MEDICINE

## 2021-06-10 PROCEDURE — 90935 HEMODIALYSIS ONE EVALUATION: CPT | Performed by: INTERNAL MEDICINE

## 2021-06-10 PROCEDURE — 85610 PROTHROMBIN TIME: CPT | Performed by: PHYSICIAN ASSISTANT

## 2021-06-10 PROCEDURE — 82948 REAGENT STRIP/BLOOD GLUCOSE: CPT

## 2021-06-10 PROCEDURE — 80048 BASIC METABOLIC PNL TOTAL CA: CPT | Performed by: PHYSICIAN ASSISTANT

## 2021-06-10 RX ORDER — GUAIFENESIN 600 MG
600 TABLET, EXTENDED RELEASE 12 HR ORAL ONCE
Status: COMPLETED | OUTPATIENT
Start: 2021-06-10 | End: 2021-06-10

## 2021-06-10 RX ORDER — ECHINACEA PURPUREA EXTRACT 125 MG
1 TABLET ORAL
Status: DISCONTINUED | OUTPATIENT
Start: 2021-06-10 | End: 2021-06-11 | Stop reason: HOSPADM

## 2021-06-10 RX ORDER — GUAIFENESIN 600 MG
600 TABLET, EXTENDED RELEASE 12 HR ORAL EVERY 12 HOURS SCHEDULED
Status: DISCONTINUED | OUTPATIENT
Start: 2021-06-10 | End: 2021-06-11 | Stop reason: HOSPADM

## 2021-06-10 RX ORDER — BENZONATATE 100 MG/1
100 CAPSULE ORAL 3 TIMES DAILY PRN
Status: DISCONTINUED | OUTPATIENT
Start: 2021-06-10 | End: 2021-06-11 | Stop reason: HOSPADM

## 2021-06-10 RX ORDER — TORSEMIDE 20 MG/1
100 TABLET ORAL 2 TIMES DAILY
Status: DISCONTINUED | OUTPATIENT
Start: 2021-06-10 | End: 2021-06-11 | Stop reason: HOSPADM

## 2021-06-10 RX ORDER — INSULIN GLARGINE 100 [IU]/ML
24 INJECTION, SOLUTION SUBCUTANEOUS
Status: DISCONTINUED | OUTPATIENT
Start: 2021-06-10 | End: 2021-06-11

## 2021-06-10 RX ADMIN — SEVELAMER HYDROCHLORIDE 800 MG: 800 TABLET, FILM COATED PARENTERAL at 11:41

## 2021-06-10 RX ADMIN — GUAIFENESIN 600 MG: 600 TABLET, EXTENDED RELEASE ORAL at 13:00

## 2021-06-10 RX ADMIN — TORSEMIDE 100 MG: 20 TABLET ORAL at 21:25

## 2021-06-10 RX ADMIN — CINACALCET 30 MG: 30 TABLET ORAL at 11:40

## 2021-06-10 RX ADMIN — LIDOCAINE 1 PATCH: 50 PATCH TOPICAL at 13:06

## 2021-06-10 RX ADMIN — NYSTATIN: 100000 POWDER TOPICAL at 17:19

## 2021-06-10 RX ADMIN — INSULIN LISPRO 1 UNITS: 100 INJECTION, SOLUTION INTRAVENOUS; SUBCUTANEOUS at 17:19

## 2021-06-10 RX ADMIN — CARVEDILOL 25 MG: 25 TABLET, FILM COATED ORAL at 17:18

## 2021-06-10 RX ADMIN — WARFARIN SODIUM 9 MG: 3 TABLET ORAL at 17:18

## 2021-06-10 RX ADMIN — INSULIN LISPRO 4 UNITS: 100 INJECTION, SOLUTION INTRAVENOUS; SUBCUTANEOUS at 13:01

## 2021-06-10 RX ADMIN — LORATADINE 10 MG: 10 TABLET ORAL at 11:40

## 2021-06-10 RX ADMIN — PREDNISOLONE ACETATE 1 DROP: 10 SUSPENSION/ DROPS OPHTHALMIC at 21:25

## 2021-06-10 RX ADMIN — INSULIN GLARGINE 24 UNITS: 100 INJECTION, SOLUTION SUBCUTANEOUS at 21:25

## 2021-06-10 RX ADMIN — PANTOPRAZOLE SODIUM 40 MG: 40 TABLET, DELAYED RELEASE ORAL at 05:28

## 2021-06-10 RX ADMIN — SERTRALINE HYDROCHLORIDE 50 MG: 50 TABLET ORAL at 11:39

## 2021-06-10 RX ADMIN — NYSTATIN: 100000 POWDER TOPICAL at 11:40

## 2021-06-10 RX ADMIN — ATROPINE SULFATE 1 DROP: 10 SOLUTION/ DROPS OPHTHALMIC at 21:25

## 2021-06-10 RX ADMIN — LEVOTHYROXINE SODIUM 50 MCG: 50 TABLET ORAL at 11:40

## 2021-06-10 RX ADMIN — SEVELAMER HYDROCHLORIDE 800 MG: 800 TABLET, FILM COATED PARENTERAL at 17:18

## 2021-06-10 RX ADMIN — GUAIFENESIN 600 MG: 600 TABLET, EXTENDED RELEASE ORAL at 23:17

## 2021-06-10 RX ADMIN — ALPRAZOLAM 0.25 MG: 0.25 TABLET ORAL at 07:32

## 2021-06-10 RX ADMIN — DORZOLAMIDE HYDROCHLORIDE AND TIMOLOL MALEATE 1 DROP: 20; 5 SOLUTION/ DROPS OPHTHALMIC at 17:18

## 2021-06-10 RX ADMIN — ATORVASTATIN CALCIUM 20 MG: 20 TABLET, FILM COATED ORAL at 17:18

## 2021-06-10 RX ADMIN — INSULIN LISPRO 3 UNITS: 100 INJECTION, SOLUTION INTRAVENOUS; SUBCUTANEOUS at 13:02

## 2021-06-10 RX ADMIN — TORSEMIDE 100 MG: 20 TABLET ORAL at 11:41

## 2021-06-10 RX ADMIN — PREDNISOLONE ACETATE 1 DROP: 10 SUSPENSION/ DROPS OPHTHALMIC at 13:01

## 2021-06-10 RX ADMIN — LATANOPROST 1 DROP: 50 SOLUTION OPHTHALMIC at 21:25

## 2021-06-10 RX ADMIN — INSULIN LISPRO 3 UNITS: 100 INJECTION, SOLUTION INTRAVENOUS; SUBCUTANEOUS at 17:18

## 2021-06-10 RX ADMIN — GUAIFENESIN 600 MG: 600 TABLET, EXTENDED RELEASE ORAL at 21:25

## 2021-06-10 RX ADMIN — GABAPENTIN 300 MG: 300 CAPSULE ORAL at 21:25

## 2021-06-10 RX ADMIN — BRIMONIDINE TARTRATE 1 DROP: 1.5 SOLUTION OPHTHALMIC at 17:18

## 2021-06-10 RX ADMIN — PREDNISOLONE ACETATE 1 DROP: 10 SUSPENSION/ DROPS OPHTHALMIC at 17:19

## 2021-06-10 NOTE — PLAN OF CARE
3 hour hemodialysis session planned for today to optimize fluid and electrolyte balance     Problem: METABOLIC, FLUID AND ELECTROLYTES - ADULT  Goal: Electrolytes maintained within normal limits  Description: INTERVENTIONS:  - Monitor labs and assess patient for signs and symptoms of electrolyte imbalances  - Administer electrolyte replacement as ordered  - Monitor response to electrolyte replacements, including repeat lab results as appropriate  - Instruct patient on fluid and nutrition as appropriate  Outcome: Progressing  Goal: Fluid balance maintained  Description: INTERVENTIONS:  - Monitor labs   - Monitor I/O and WT  - Instruct patient on fluid and nutrition as appropriate  - Assess for signs & symptoms of volume excess or deficit  Outcome: Progressing

## 2021-06-10 NOTE — MALNUTRITION/BMI
This medical record reflects one or more clinical indicators suggestive of morbid obesity  BMI Findings:  Adult BMI Classifications: Morbid Obesity 45-49 9     Body mass index is 45 03 kg/m²  See Nutrition note dated 6/10/21 for additional details  Completed nutrition assessment is viewable in the nutrition documentation

## 2021-06-10 NOTE — PROGRESS NOTES
Progress Note - Neurology   Diana Rivers 49 y o  female [id number]  Unit/Bed#: /-01    Assessment/Plan:    * Stroke-like symptoms  Assessment & Plan  Diana Rivers is a 49 y o  female with ESRD on dialysis, DM type 2, h/o DVT/PE on Coumadin who initially was admitted with chest pain  After dialysis on 6/9/2021 a RRT was called for acute onset of aphasia and left facial droop followed by a stroke alert at 1159  NIHSS 6 for speech difficulties, slight left facial droop, LUE/LLE drift, LUE ataxia, and neglect in RUE/RLE  Blood glucose 87 and   · CTA head and neck with no LVO or hemodynamically critical stenosis  · CT head completed with no acute intracranial pathology but demonstrating 1 4 cm paramedian left frontal vertex meningioma  · No IV tPa given due to bleeding risk  Patient on coumadin with today's INR 2 46    · Routine EEG: occasional left temporal delta slowing and slowing of the posteriorly dominant rhythm suggestive of mild nonspecific diffuse cerebral dysfunction, but no epileptiform discharges    Patient continues to have right temporal visual field deficit, but the weakness, ataxia, and neglect have resolved  Patient's states that she felt like her "blood sugar and blood pressure were low" during the event  Etiology for symptoms may have been due to possible low BP during dialysis vs possible TIA/CVA  Plan:   - MRI brain without contrast pending  - If MRI is positive, will need 2D Echo  - Telemetry monitoring for arrhythmia  - Lipid panel, HgA1C, TSH pending  - Continue warfarin  - Continue home Lipitor 20 mg daily  - Normotensive goal; avoid hypotension  - Maintain euglycemia and normothermia  - PT/OT/ST  - Frequent neurological checks, Stat CT head with acute decline of in exam/mental status; Notify neurology with any changes in examination  Recommendations for outpatient neurological follow up have yet to be determined        Subjective:     Patient seen and examined with attending neurologist while patient was at hemodialysis  Patient states that she is feeling much better today  Her speech has returned back to baseline and she denies any left-sided weakness  She has chronic visual disturbances, and continues to new right temporal peripheral visual field loss  She states that yesterday it felt like "my blood pressure was too low and the sugar was low "    Yesterday, patient received her antihypertensives prior to her hemodialysis treatment  Today, she asked the nurse not give her the antihypertensives, and currently while in hemodialysis she is feeling normal   She does have history of panic attacks, but this did not feel similar to one of her previous panic attacks          Past Medical History:   Diagnosis Date    Abnormal uterine bleeding (AUB)     Anxiety     Arthritis     Chronic kidney disease     Claustrophobia     Diabetes mellitus [facility]     Disease of thyroid gland     DVT (deep venous thrombosis) (HCC)     End stage kidney disease (HCC)     Foot ulcer due to secondary DM ([facility] )     [facility]     Tues, Thurs, Sat    Hypertension     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[facility] )     right eye    Morbid obesity ([facility] )     Osteomyelitis of fifth toe of right foot (HCC)     Panic attacks     Pulmonary embolism (HCC)     Reflux esophagitis     Thrombophlebitis of saphenous vein     Warfarin anticoagulation      Past Surgical History:   Procedure Laterality Date    AMPUTATION      r 4th toe    ARTERIOVENOUS GRAFT PLACEMENT      CATARACT EXTRACTION Bilateral     CERVICAL BIOPSY  W/ LOOP ELECTRODE EXCISION       SECTION      DIALYSIS FISTULA CREATION      DILATION AND CURETTAGE OF UTERUS      ENDOMETRIAL ABLATION W/ NOVASURE      EYE SURGERY      HYSTERECTOMY      @ age 57 (complete)    IR AV FISTULAGRAM/GRAFTOGRAM  10/11/2019    IR AV FISTULAGRAM/GRAFTOGRAM  2020    IR AV FISTULAGRAM/GRAFTOGRAM  2020  OOPHORECTOMY Bilateral     @ age 57    PERICARDIUM SURGERY      LA AMPUTATION TOE,MT-P JT Right 5/28/2020    Procedure: AMPUTATION TOE- 5th;  Surgeon: Aldo Jenkins DPM;  Location: [facility];  Service: Podiatry    LA COLONOSCOPY FLX DX W/COLLJ [address] PFRMD N/A 3/13/2019    Procedure: COLONOSCOPY;  Surgeon: Remi Cornell MD;  [facility]: [facility]; Service: Gastroenterology    LA ESOPHAGOGASTRODUODENOSCOPY TRANSORAL DIAGNOSTIC N/A 3/13/2019    Procedure: ESOPHAGOGASTRODUODENOSCOPY (EGD); Surgeon: Remi Cornell MD;  [facility];   Service: Gastroenterology    LA LAPAROSCOPY W TOT HYSTERECT UTERUS 250 GRAM OR LESS N/A 2/16/2016    Procedure: HYSTERECTOMY LAPAROSCOPIC TOTAL [facility]), with bilateral salpingectomy;  Surgeon: Aldair Bonner DO;  [facility];  Service: Gynecology    THROMBECTOMY / ARTERIOVENOUS GRAFT REVISION      TOE SURGERY Right     removal of the 4th toe     Family History   Problem Relation Age of Onset    Heart disease Family     Diabetes Family     Heart disease Mother     Cancer Brother         neck    Throat cancer Brother     Ovarian cancer Maternal Aunt 36     Social History     Socioeconomic History    Marital status: Single     Spouse name: None    Number of children: None    Years of education: None    Highest education level: None   Occupational History    None   Social Needs    Financial resource strain: None    Food insecurity     Worry: None     Inability: None    Transportation needs     Medical: None     Non-medical: None   Tobacco Use    Smoking status: Never Smoker    Smokeless tobacco: Never Used   Substance and Sexual Activity    Alcohol use: Never     Alcohol/week: 0 0 standard drinks     Frequency: Never     Drinks per session: Patient refused     Binge frequency: Patient refused    Drug use: No    Sexual activity: Not Currently     Partners: Male     Birth control/protection: Abstinence   Lifestyle    Physical activity     Days per week: None     Minutes per session: None    Stress: None   Relationships    Social connections     Talks on phone: None     Gets together: None     Attends Rastafari service: None     Active member of club or organization: None     Attends meetings of clubs or organizations: None     Relationship status: None    Intimate partner violence     Fear of current or ex partner: None     Emotionally abused: None     Physically abused: None     Forced sexual activity: None   Other Topics Concern    None   Social History Narrative    None     E-Cigarette/Vaping    E-Cigarette Use Never User      E-Cigarette/Vaping Substances    Nicotine No     THC No     CBD No     Flavoring No     Other No     Unknown No          Medications:   All current active meds have been reviewed and current meds:  Scheduled Meds:  Current Facility-Administered Medications   Medication Dose Route Frequency Provider Last Rate    acetaminophen  650 mg Oral Q6H PRN Hetul Andrea, DO      albuterol  2 puff Inhalation Q6H PRN Hetul Andrea, DO      ALPRAZolam  0 25 mg Oral BID PRN Hetul Andrea, DO      atorvastatin  20 mg Oral QPM Hetul Andrea, DO      atropine  1 drop Both Eyes HS Hetul Andrea, DO      brimonidine  1 drop Both Eyes BID Hetul Andrea, DO      carvedilol  25 mg Oral BID With Meals Hetul Andrea, DO      cinacalcet  30 mg Oral Daily Hetul Andrea, DO      dorzolamide-timolol  1 drop Both Eyes BID Hetul Andrea, DO      gabapentin  300 mg Oral HS Hetul Andrea, DO      guaiFENesin  600 mg Oral Q12H [address] Jesi Starks PA-C      insulin glargine  24 Units Subcutaneous HS Jesi Starks PA-C      insulin lispro  1-5 Units Subcutaneous TID AC Jesi Starks PA-C      insulin lispro  1-5 Units Subcutaneous HS Jesi Starks PA-C      insulin lispro  3 Units Subcutaneous TID With Meals Hetul Andrea, DO      latanoprost  1 drop Both Eyes HS Hetul Andrea, DO      levothyroxine  50 mcg Oral Daily Hetul Andrea, DO      lidocaine  1 patch Topical Daily Hetul Andrea, DO      lidocaine  1 patch Topical Daily Brandyn Castaneda PA-C      loratadine  10 mg Oral Daily Hetul Andrea, DO      LORazepam  1 mg Oral Once in imaging Hetul Jeraline Lombardi, DO      nystatin   Topical BID Hetul Andrea, DO      ondansetron  4 mg Oral Q4H PRN Hetul Andrea, DO      pantoprazole  40 mg Oral Early Morning Hetul Andrea, DO      phenol  1 spray Mouth/Throat Q2H PRN Payton Finn PA-C      prednisoLONE acetate  1 drop Both Eyes 4x Daily Hetul Andrea, DO      senna  2 tablet Oral HS PRN Hetul Andrea, DO      sertraline  50 mg Oral Daily Hetul Andrea, DO      sevelamer  800 mg Oral TID With Meals Hetul Andrea, DO      torsemide  100 mg Oral BID Melita Villasenor PA-C      traMADol  50 mg Oral BID PRN Brandyn Castaneda PA-C      warfarin  9 mg Oral Daily (warfarin) Hetul Andrea, DO       Continuous Infusions:   PRN Meds:   acetaminophen    albuterol    ALPRAZolam    LORazepam    ondansetron    phenol    senna    traMADol       ROS:   Review of Systems   Eyes: Positive for visual disturbance  Neurological: Negative for dizziness, tremors, facial asymmetry, speech difficulty, weakness, light-headedness, numbness and headaches  Vitals:   /85 (BP Location: Right arm)   Pulse 94   Temp 98 9 °F (37 2 °C) (Oral)   Resp 18   Ht 5' 6" (1 676 m)   Wt 127 kg (278 lb 15 7 oz)   LMP 02/12/2016 (Exact Date)   SpO2 (!) 89%   BMI 45 03 kg/m²     Physical Exam:   Physical Exam  Vitals signs and nursing note reviewed  Constitutional:       Appearance: Normal appearance  Comments: Patient sitting comfortably in HD chair in no acute distress  HENT:      Head: Normocephalic and atraumatic  Nose: Nose normal       Mouth/Throat:      Mouth: Mucous membranes are moist       Pharynx: Oropharynx is clear  Eyes:      Extraocular Movements: Extraocular movements intact        Conjunctiva/sclera: Conjunctivae normal       Pupils: Pupils are equal, round, and reactive to light  Pulmonary:      Effort: Pulmonary effort is normal    Musculoskeletal: Normal range of motion  Skin:     General: Skin is warm and dry  Neurological:      Mental Status: She is alert and oriented to person, place, and time  Comments: See full neuro exam below       Neurologic Exam     Mental Status   Oriented to person, place, and time  Patient alert and oriented to person, place, city, month, and year  No dysarthria or aphasia  Able to follow simple commands  Cranial Nerves     CN III, IV, VI   Pupils are equal, round, and reactive to light  Pupils 3 mm, round, reactive to light bilaterally  EOMs intact without nystagmus  patient does have a right temporal peripheral visual field loss, most pronounced in the right upper quadrant  Slight right eyelid ptosis  Facial expressions full and symmetric  Facial sensation to light touch intact throughout  Tongue midline  Palate raises symmetrically  Full strength in sternocleidomastoid and trapezius muscles  Motor Exam   Muscle bulk: normal  Overall muscle tone: normal   No pronator drift  5/5 strength in bilateral upper and lower extremities except proximally 4/5 bilateral iliopsoas muscle secondary to pain limitation  Sensory Exam   Sensation to light touch intact throughout  Initially demonstrated right-sided neglect when both upper extremities were touched  However when this was retested in both arms, no neglect  No neglect with simultaneous stimulation when bilateral lower extremities or bilateral cheeks are being touched  Gait, Coordination, and Reflexes     Gait  Gait: (Deferred due to patient currently receiving dialysis)  No ataxia finger-to-nose bilaterally  Normal finger tapping  No resting or action tremor  No ankle clonus bilaterally  Labs: I have personally reviewed pertinent reports     Recent Results (from the past 24 hour(s))   Fingerstick Glucose (POCT) Collection Time: 06/09/21  3:54 PM   Result Value Ref Range    POC Glucose 181 (H) 65 - 140 mg/dl   Fingerstick Glucose (POCT)    Collection Time: 06/09/21  9:07 PM   Result Value Ref Range    POC Glucose 174 (H) 65 - 140 mg/dl   Fingerstick Glucose (POCT)    Collection Time: 06/10/21  6:27 AM   Result Value Ref Range    POC Glucose 115 65 - 140 mg/dl   Basic metabolic panel    Collection Time: 06/10/21  7:52 AM   Result Value Ref Range    Sodium 133 (L) 136 - 145 mmol/L    Potassium 4 8 3 5 - 5 3 mmol/L    Chloride 98 (L) 100 - 108 mmol/L    CO2 27 21 - 32 mmol/L    ANION GAP 8 4 - 13 mmol/L    BUN 40 (H) 5 - 25 mg/dL    Creatinine 6 39 (H) 0 60 - 1 30 mg/dL    Glucose 181 (H) 65 - 140 mg/dL    Calcium 8 3 8 3 - 10 1 mg/dL    eGFR 7 ml/min/1 73sq m   Protime-INR    Collection Time: 06/10/21  7:52 AM   Result Value Ref Range    Protime 21 5 (H) 11 6 - 14 5 seconds    INR 1 87 (H) 0 84 - 1 19   Fingerstick Glucose (POCT)    Collection Time: 06/10/21 11:31 AM   Result Value Ref Range    POC Glucose 379 (H) 65 - 140 mg/dl       Imaging: I have personally reviewed pertinent imaging in PACS, including CT head, CTA head/neck,  and I have personally reviewed PACS reports  EKG, Pathology, and Other Studies: I have personally reviewed pertinent reports  VTE Prophylaxis: Sequential compression device (Venodyne)  and Warfarin (Coumadin)      Counseling / Coordination of Care  Total time spent today 23 minutes  Greater than 50% of total time was spent with the patient and/or family counseling and/or coordination of care  A description of the counseling/coordination of care:  Patient was seen and evaluated  Discussed with attending  Chart reviewed thoroughly including laboratory and imaging studies    Plan of care discussed with patient and primary team

## 2021-06-10 NOTE — ASSESSMENT & PLAN NOTE
· Stroke alert called during HD after she developed slurred speech, possible facial droop, and left sided drift and ataxia  · Neurology consulted and appreciate their input  · Meningioma noted on CT head  · Routine EEG: Diffuse cerebral dysfunction with focal neuronal dysfunction in the left temporal region  No epileptiform discharged noted    · MRI brain: pending  · Coumadin therapeutic (DVT history)

## 2021-06-10 NOTE — PROGRESS NOTES
[address] [facility]  Progress Note Jonathon Comer 1965, 49 y o  female MRN: [id number]  Unit/Bed#: -01 Encounter: [id number]  Primary Care Provider: Mickey Marie MD   Date and time admitted to hospital: 6/8/2021  7:07 AM    * Stroke-like symptoms  Assessment & Plan  · Stroke alert called during HD after she developed slurred speech, possible facial droop, and left sided drift and ataxia  · Neurology consulted and appreciate their input  · Meningioma noted on CT head  · Routine EEG: Diffuse cerebral dysfunction with focal neuronal dysfunction in the left temporal region  No epileptiform discharged noted  · MRI brain: pending  · Coumadin therapeutic (DVT history)    Type 2 diabetes mellitus [facility])  Assessment & Plan  Lab Results   Component Value Date    HGBA1C 8 6 (H) 02/20/2020     Recent Labs     06/09/21  1554 06/09/21  2107 06/10/21  0627 06/10/21  1131   POCGLU 181* 174* 115 379*     Blood Sugar Average: Last 72 hrs:  (P) 187 75     · BS labile - received a 1/2 amp of D50 during rapid response  · Decreased Lantus to 20 units  Now BS >300  · Increase Lantus to 24 units  · Add Sliding scale insulin    ESRD on hemodialysis [facility])  Assessment & Plan  · HD every T/Th/Sat pre nephrology  · S/p UF for volume overload  · Nephrology following and appreciate their input  Hypertension  Assessment & Plan  · Labile  · Losartan stopped by Nephrology secondary to hyperkalemia  · Continue carvedilol 25 mg BID and torsemide 100 mg BID    Hyperkalemia-resolved as of 6/10/2021  Assessment & Plan  · Resolved with dialysis  · Dietary indiscretion  VTE Pharmacologic Prophylaxis: VTE Score: 5 High Risk (Score >/= 5) - Pharmacological DVT Prophylaxis Ordered: warfarin (Coumadin)  Sequential Compression Devices Ordered  Patient Centered Rounds: I performed bedside rounds with nursing staff today    Discussions with Specialists or Other Care Team Provider: None    Education and Discussions with Family / Patient: Patient declined call to   Time Spent for Care: 30 minutes  More than 50% of total time spent on counseling and coordination of care as described above  Current Length of Stay: 2 day(s)  Current Patient Status: Inpatient   Certification Statement: The patient will continue to require additional inpatient hospital stay due to pending MRI  Discharge Plan: Anticipate discharge later today or tomorrow to home  Code Status: Level 1 - Full Code    Subjective:   Patient reports she feels "much better than yesterday"  She is complaining of a sore throat and has some phlegm in her chest   Otherwise, no new complaints  Objective:     Vitals:   Temp (24hrs), Av 2 °F (37 3 °C), Min:98 7 °F (37 1 °C), Max:100 2 °F (37 9 °C)    Temp:  [98 7 °F (37 1 °C)-100 2 °F (37 9 °C)] 98 9 °F (37 2 °C)  HR:  [] 94  Resp:  [18-20] 18  BP: ()/() 107/85  SpO2:  [89 %-92 %] 89 %  Body mass index is 45 03 kg/m²  Input and Output Summary (last 24 hours): Intake/Output Summary (Last 24 hours) at 6/10/2021 1224  Last data filed at 6/10/2021 1050  Gross per 24 hour   Intake 500 ml   Output 1177 ml   Net -677 ml       Physical Exam:   Physical Exam  Vitals signs and nursing note reviewed  Constitutional:       General: She is not in acute distress  Appearance: She is well-developed  HENT:      Head: Normocephalic and atraumatic  Eyes:      Conjunctiva/sclera: Conjunctivae normal    Neck:      Musculoskeletal: Neck supple  Cardiovascular:      Rate and Rhythm: Normal rate and regular rhythm  Heart sounds: No murmur  Pulmonary:      Effort: Pulmonary effort is normal  No respiratory distress  Breath sounds: Normal breath sounds  Abdominal:      Palpations: Abdomen is soft  Tenderness: There is no abdominal tenderness  Skin:     General: Skin is warm and dry  Neurological:      Mental Status: She is alert          Additional Data:     Labs:  Results from last 7 days   Lab Units 06/09/21  0517   WBC Thousand/uL 7 03   HEMOGLOBIN g/dL 8 9*   HEMATOCRIT % 27 2*   PLATELETS Thousands/uL 125*   NEUTROS PCT % 79*   LYMPHS PCT % 10*   MONOS PCT % 8   EOS PCT % 2     Results from last 7 days   Lab Units 06/10/21  0752   SODIUM mmol/L 133*   POTASSIUM mmol/L 4 8   CHLORIDE mmol/L 98*   CO2 mmol/L 27   BUN mg/dL 40*   CREATININE mg/dL 6 39*   ANION GAP mmol/L 8   CALCIUM mg/dL 8 3   GLUCOSE RANDOM mg/dL 181*     Results from last 7 days   Lab Units 06/10/21  0752   INR  1 87*     Results from last 7 days   Lab Units 06/10/21  1131 06/10/21  0627 06/09/21  2107 06/09/21  1554 06/09/21  1311 06/09/21  1300 06/09/21  1155 06/09/21  0605 06/08/21  2046 06/08/21  1620 06/08/21  1400 06/08/21  0917   POC GLUCOSE mg/dl 379* 115 174* 181* 166* 153* 87 220* 330* 133 94 221*               Lines/Drains:  Invasive Devices     Peripheral Intravenous Line            Peripheral IV 06/09/21 Right Antecubital 1 day    Peripheral IV 06/09/21 Right;Ventral (anterior) Forearm 1 day          Line            Hemodialysis AV Fistula 02/20/18 Left Forearm 1205 days                  Telemetry:  Telemetry Orders (From admission, onward)             48 Hour Telemetry Monitoring  Continuous x 48 hours     Question:  Reason for 48 Hour Telemetry  Answer:  Acute CVA (<24 hrs old, hemispheric strokes, selected brainstem strokes, cardiac arrhythmias)                 Telemetry Reviewed: Normal Sinus Rhythm  Indication for Continued Telemetry Use: No indication for continued use  Will discontinue            Imaging: Reviewed radiology reports from this admission including: CT head    Recent Cultures (last 7 days):     Last 24 Hours Medication List:   Current Facility-Administered Medications   Medication Dose Route Frequency Provider Last Rate    acetaminophen  650 mg Oral Q6H PRN Hetul Andrea, DO      albuterol  2 puff Inhalation Q6H PRN Hetul Andrea, DO      ALPRAZolam  0 25 mg Oral BID PRN Hetul Andrea, DO      atorvastatin  20 mg Oral QPM Hetul Andrea, DO      atropine  1 drop Both Eyes HS Hetul Andrea, DO      brimonidine  1 drop Both Eyes BID Hetul Andrea, DO      carvedilol  25 mg Oral BID With Meals Hetul Andrea, DO      cinacalcet  30 mg Oral Daily Hetul Andrea, DO      dorzolamide-timolol  1 drop Both Eyes BID Hetul Andrea, DO      gabapentin  300 mg Oral HS Hetul Andrea, DO      insulin glargine  20 Units Subcutaneous HS Vanessa Veliz PA-C      insulin lispro  3 Units Subcutaneous TID With Meals Hetul Andrea, DO      latanoprost  1 drop Both Eyes HS Hetul Andrea, DO      levothyroxine  50 mcg Oral Daily Hetul Andrea, DO      lidocaine  1 patch Topical Daily Hetul Andrea, DO      lidocaine  1 patch Topical Daily Sherolyn Duverney, PA-C      loratadine  10 mg Oral Daily Hetul Andrea, DO      LORazepam  1 mg Oral Once in imaging [facility] Maggi, DO      nystatin   Topical BID Hetul Andrea, DO      ondansetron  4 mg Oral Q4H PRN Hetul Andrea, DO      pantoprazole  40 mg Oral Early Morning Hetul Andrea, DO      prednisoLONE acetate  1 drop Both Eyes 4x Daily Hetul Andrea, DO      senna  2 tablet Oral HS PRN Hetul Andrea, DO      sertraline  50 mg Oral Daily Hetul Andrea, DO      sevelamer  800 mg Oral TID With Meals [facility], DO      torsemide  100 mg Oral BID Melita Villasenor PA-C      traMADol  50 mg Oral BID PRN Sherolyn Duverney, PA-C      warfarin  9 mg Oral Daily (warfarin) [facility], DO          Today, Patient Was Seen By: Miah Go PA-C    **Please Note: This note may have been constructed using a voice recognition system  **

## 2021-06-10 NOTE — ASSESSMENT & PLAN NOTE
· Labile  · Losartan stopped by Nephrology secondary to hyperkalemia  · Continue carvedilol 25 mg BID and torsemide 100 mg BID

## 2021-06-10 NOTE — PROGRESS NOTES
NEPHROLOGY PROGRESS NOTE   Payton Blackwell 49 y o  female MRN: [id number]  Unit/Bed#: -01 Encounter: [id number]    Hemodialysis procedure note:  Seen on hemodialysis approximately 9:05 a m  Tolerating treatment without difficulty    ASSESSMENT & PLAN    1  End-stage kidney disease on hemodialysis Tuesday Thursday Saturday at [address]-Tuesday Thursday Saturday schedule required urgent dialysis because of hyperkalemia on admission, on June 9th had a episode dysarthria stroke alert was called appreciate neurology evaluation and critical care evaluation     2  Hyperkalemia-this has improved required a 2 potassium bath for 2-1/2 hours and a 1 potassium bath for 1-1/2 hours, now on a 2 potassium bath     3  Chest pain CT a negative for PE and currently stable      4  Volume overload-challenging her dry weight blood pressure stable     5  Anemia of chronic kidney disease-no MITCHELL with a history of a PE     6  CKD bone mineral disease-continue Hectorol 2 mcg with dialysis, Sensipar 30 mg daily, Renvela 3 tablets with meals and 2 with snacks     7   Stroke-like symptoms-being evaluated for potential seizure, has an interior frontal meningioma       DISCUSSION    Today will UF 0 5-1 L, current hemodynamics are stable and will await neurologic recommendations and monitor for symptoms    SUBJECTIVE:    Patient was seen today no acute complaints on dialysis hemodynamics stable states she is feeling better than yesterday overall overall    OBJECTIVE:  Current Weight: Weight - Scale: 127 kg (278 lb 15 7 oz)  @  Vitals:    06/09/21 2313 06/10/21 0745 06/10/21 0800 06/10/21 0830   BP: 130/64 128/75 121/78 125/69   BP Location:  Right arm Right arm Right arm   Pulse: 79 80 80 80   Resp: 20 18 18 18   Temp: 98 9 °F (37 2 °C) 98 9 °F (37 2 °C)     TempSrc:  Oral     SpO2: (!) 89%      Weight:       Height:           Intake/Output Summary (Last 24 hours) at 6/10/2021 0904  Last data filed at 6/10/2021 0745  Gross per 24 hour Intake 500 ml   Output 3205 ml   Net -2705 ml     Weight (last 2 days)     Date/Time   Weight    06/08/21 16:22:18   127 (278 98)    06/08/21 0734   127 (279)              General: conscious, cooperative, in no acute distress  Eyes: conjunctivae pink, anicteric sclerae  ENT: lips and mucous membranes moist  Neck: supple, no JVD  Chest: no respiratory distress, no accessory muscle use, normal respiratory effort  CVS: normal heart rate, no friction rub  Abdomen: soft, non-tender, non-distended, normoactive bowel sounds  Extremities: no edema of both legs  Skin: no rash  Neuro: awake, alert, oriented      Medications:    Current Facility-Administered Medications:     acetaminophen (TYLENOL) tablet 650 mg, 650 mg, Oral, Q6H PRN, Hetul Andrea, DO, 650 mg at 06/09/21 1256    albuterol (PROVENTIL HFA,VENTOLIN HFA) inhaler 2 puff, 2 puff, Inhalation, Q6H PRN, Hetul Andrea, DO    ALPRAZolam (XANAX) tablet 0 25 mg, 0 25 mg, Oral, BID PRN, Hetul Andrea, DO, 0 25 mg at 06/10/21 0732    atorvastatin (LIPITOR) tablet 20 mg, 20 mg, Oral, QPM, Hetul Andrea, DO, 20 mg at 06/09/21 1736    atropine (ISOPTO ATROPINE) 1 % ophthalmic solution 1 drop, 1 drop, Both Eyes, HS, Hetul Andrea, DO, 1 drop at 06/09/21 2120    brimonidine (ALPHAGAN P) 0 15 % ophthalmic solution 1 drop, 1 drop, Both Eyes, BID, Hetul Andrea, DO, 1 drop at 06/09/21 1734    carvedilol (COREG) tablet 25 mg, 25 mg, Oral, BID With Meals, Hetul Andrea, DO, 25 mg at 06/09/21 1735    cinacalcet (SENSIPAR) tablet 30 mg, 30 mg, Oral, Daily, Hetul Andrea, DO, 30 mg at 06/09/21 0812    dorzolamide-timolol (COSOPT) 22 3-6 8 MG/ML ophthalmic solution 1 drop, 1 drop, Both Eyes, BID, Hetul Andrea, DO, 1 drop at 06/09/21 1736    gabapentin (NEURONTIN) capsule 300 mg, 300 mg, Oral, HS, Farhad Andrea DO, 300 mg at 06/09/21 2119    insulin glargine (LANTUS) subcutaneous injection 20 Units 0 2 mL, 20 Units, Subcutaneous, HS, Vanessa Veliz PA-C, 20 Units at 06/09/21 2119    insulin lispro (HumaLOG) 100 units/mL subcutaneous injection 3 Units, 3 Units, Subcutaneous, TID With Meals, Hetul Andrea, DO, 3 Units at 06/09/21 1743    latanoprost (XALATAN) 0 005 % ophthalmic solution 1 drop, 1 drop, Both Eyes, HS, Hetul Andrea, DO, 1 drop at 06/09/21 2120    levothyroxine tablet 50 mcg, 50 mcg, Oral, Daily, Hetul Andrea, DO, 50 mcg at 06/09/21 0813    lidocaine (LIDODERM) 5 % patch 1 patch, 1 patch, Topical, Daily, Hetul Andrea, DO, Stopped at 06/09/21 0816    lidocaine (LIDODERM) 5 % patch 1 patch, 1 patch, Topical, Daily, Breezy Perez PA-C, Stopped at 06/09/21 1013    loratadine (CLARITIN) tablet 10 mg, 10 mg, Oral, Daily, Hetul Andrea, DO, 10 mg at 06/09/21 0813    LORazepam (ATIVAN) tablet 1 mg, 1 mg, Oral, Once in imaging, Hetul Andrea, DO    nystatin (MYCOSTATIN) powder, , Topical, BID, Hetul Andrea, DO, Given at 06/09/21 1721    ondansetron (ZOFRAN-ODT) dispersible tablet 4 mg, 4 mg, Oral, Q4H PRN, Hetul Andrea, DO    pantoprazole (PROTONIX) EC tablet 40 mg, 40 mg, Oral, Early Morning, Hetul Andrea, DO, 40 mg at 06/10/21 0528    prednisoLONE acetate (PRED FORTE) 1 % ophthalmic suspension 1 drop, 1 drop, Both Eyes, 4x Daily, Hetul Andrea, DO, 1 drop at 06/09/21 2120    senna (SENOKOT) tablet 17 2 mg, 2 tablet, Oral, HS PRN, Hetul Andrea, DO    sertraline (ZOLOFT) tablet 50 mg, 50 mg, Oral, Daily, Hetul Andrea, DO, 50 mg at 06/09/21 0812    sevelamer (RENAGEL) tablet 800 mg, 800 mg, Oral, TID With Meals, Hetul Andrea, DO, 800 mg at 06/09/21 1735    torsemide (DEMADEX) tablet 100 mg, 100 mg, Oral, BID, Melita Villasenor PA-C    traMADol (ULTRAM) tablet 50 mg, 50 mg, Oral, BID PRN, Breezy Perez PA-C, 50 mg at 06/09/21 2119    warfarin (COUMADIN) tablet 9 mg, 9 mg, Oral, Daily (warfarin), Farhad Andrea DO, 9 mg at 06/09/21 1735    Invasive Devices:      Lab Results:   Results from last 7 days   Lab Units 06/10/21  0752 06/09/21  1347 06/09/21  0517 06/08/21  0759   WBC Thousand/uL  -- --  7 03 6 76   HEMOGLOBIN g/dL  --   --  8 9* 9 0*   HEMATOCRIT %  --   --  27 2* 27 6*   PLATELETS Thousands/uL  --   --  125* 147*   POTASSIUM mmol/L 4 8 4 5 5 0 7 8*   CHLORIDE mmol/L 98* 98* 97* 104   CO2 mmol/L 27 30 30 26   BUN mg/dL 40* 25 44* 95*   CREATININE mg/dL 6 39* 4 57* 6 38* 10 30*   CALCIUM mg/dL 8 3 8 1* 8 2* 8 3         Portions of the record may have been created with voice recognition software  Occasional wrong word or "sound a like" substitutions may have occurred due to the inherent limitations of voice recognition software  Read the chart carefully and recognize, using context, where substitutions have occurred  If you have any questions, please contact the dictating provider

## 2021-06-10 NOTE — ASSESSMENT & PLAN NOTE
Lab Results   Component Value Date    HGBA1C 8 6 (H) 02/20/2020     Recent Labs     06/09/21  1554 06/09/21  2107 06/10/21  0627 06/10/21  1131   POCGLU 181* 174* 115 379*     Blood Sugar Average: Last 72 hrs:  (P) 187 75     · BS labile - received a 1/2 amp of D50 during rapid response  · Decreased Lantus to 20 units  Now BS >300  · Increase Lantus to 24 units    · Add Sliding scale insulin

## 2021-06-10 NOTE — NURSING NOTE
Post-Dialysis RN Treatment Note    Blood Pressure:  Pre 128/75 mm/Hg  Post 136/76 mmHg   EDW  124 5 kg    Weight:  Pre 125 7 kg   Post 125 2 kg   Mode of weight measurement: Standing Scale   Volume Removed  477 ml    Treatment duration 3 hours    NS given  No    Treatment shortened?  No   Medications given during Rx None Reported   Estimated Kt/V  None Reported   Access type: AV fistula   Access Status: Yes, describe: BFR at 400    Report called to primary nurse   Yes

## 2021-06-11 ENCOUNTER — APPOINTMENT (INPATIENT)
Dept: RADIOLOGY | Facility: HOSPITAL | Age: 56
DRG: 640 | End: 2021-06-11
Payer: MEDICARE

## 2021-06-11 VITALS
HEIGHT: 66 IN | BODY MASS INDEX: 44.83 KG/M2 | SYSTOLIC BLOOD PRESSURE: 150 MMHG | DIASTOLIC BLOOD PRESSURE: 74 MMHG | HEART RATE: 72 BPM | OXYGEN SATURATION: 98 % | WEIGHT: 278.98 LBS | TEMPERATURE: 98.6 F | RESPIRATION RATE: 12 BRPM

## 2021-06-11 PROBLEM — R29.90 STROKE-LIKE SYMPTOMS: Status: RESOLVED | Noted: 2021-06-09 | Resolved: 2021-06-11

## 2021-06-11 LAB
CHOLEST SERPL-MCNC: 121 MG/DL (ref 50–200)
EST. AVERAGE GLUCOSE BLD GHB EST-MCNC: 194 MG/DL
GLUCOSE SERPL-MCNC: 105 MG/DL (ref 65–140)
GLUCOSE SERPL-MCNC: 166 MG/DL (ref 65–140)
GLUCOSE SERPL-MCNC: 72 MG/DL (ref 65–140)
GLUCOSE SERPL-MCNC: 83 MG/DL (ref 65–140)
HBA1C MFR BLD: 8.4 %
HDLC SERPL-MCNC: 44 MG/DL
LDLC SERPL CALC-MCNC: 53 MG/DL (ref 0–100)
NONHDLC SERPL-MCNC: 77 MG/DL
TRIGL SERPL-MCNC: 121 MG/DL
TSH SERPL DL<=0.05 MIU/L-ACNC: 2.28 UIU/ML (ref 0.36–3.74)

## 2021-06-11 PROCEDURE — 70551 MRI BRAIN STEM W/O DYE: CPT

## 2021-06-11 PROCEDURE — 84443 ASSAY THYROID STIM HORMONE: CPT | Performed by: PHYSICIAN ASSISTANT

## 2021-06-11 PROCEDURE — 83036 HEMOGLOBIN GLYCOSYLATED A1C: CPT | Performed by: PHYSICIAN ASSISTANT

## 2021-06-11 PROCEDURE — 99232 SBSQ HOSP IP/OBS MODERATE 35: CPT | Performed by: PSYCHIATRY & NEUROLOGY

## 2021-06-11 PROCEDURE — 80061 LIPID PANEL: CPT | Performed by: PHYSICIAN ASSISTANT

## 2021-06-11 PROCEDURE — NC001 PR NO CHARGE: Performed by: NURSE PRACTITIONER

## 2021-06-11 PROCEDURE — G1004 CDSM NDSC: HCPCS

## 2021-06-11 PROCEDURE — 82948 REAGENT STRIP/BLOOD GLUCOSE: CPT

## 2021-06-11 PROCEDURE — 99232 SBSQ HOSP IP/OBS MODERATE 35: CPT | Performed by: INTERNAL MEDICINE

## 2021-06-11 PROCEDURE — 99239 HOSP IP/OBS DSCHRG MGMT >30: CPT | Performed by: NURSE PRACTITIONER

## 2021-06-11 RX ORDER — BENZONATATE 100 MG/1
100 CAPSULE ORAL 3 TIMES DAILY PRN
Qty: 20 CAPSULE | Refills: 0 | Status: SHIPPED | OUTPATIENT
Start: 2021-06-11 | End: 2021-11-21

## 2021-06-11 RX ORDER — WARFARIN SODIUM 3 MG/1
9 TABLET ORAL
Qty: 10 TABLET | Refills: 0 | Status: SHIPPED | OUTPATIENT
Start: 2021-06-11

## 2021-06-11 RX ORDER — ECHINACEA PURPUREA EXTRACT 125 MG
1 TABLET ORAL 3 TIMES DAILY PRN
Qty: 30 ML | Refills: 0 | Status: SHIPPED | OUTPATIENT
Start: 2021-06-11 | End: 2021-11-21

## 2021-06-11 RX ORDER — LORAZEPAM 2 MG/ML
0.5 INJECTION INTRAMUSCULAR ONCE
Status: COMPLETED | OUTPATIENT
Start: 2021-06-11 | End: 2021-06-11

## 2021-06-11 RX ORDER — INSULIN GLARGINE 100 [IU]/ML
20 INJECTION, SOLUTION SUBCUTANEOUS
Qty: 10 ML | Refills: 0 | Status: SHIPPED | OUTPATIENT
Start: 2021-06-11 | End: 2021-11-13 | Stop reason: HOSPADM

## 2021-06-11 RX ORDER — SEVELAMER HYDROCHLORIDE 800 MG/1
800 TABLET, FILM COATED ORAL
Qty: 30 TABLET | Refills: 0 | Status: ON HOLD | OUTPATIENT
Start: 2021-06-11 | End: 2021-11-13 | Stop reason: SDUPTHER

## 2021-06-11 RX ORDER — INSULIN GLARGINE 100 [IU]/ML
18 INJECTION, SOLUTION SUBCUTANEOUS
Status: DISCONTINUED | OUTPATIENT
Start: 2021-06-11 | End: 2021-06-11 | Stop reason: HOSPADM

## 2021-06-11 RX ORDER — ALBUTEROL SULFATE 90 UG/1
2 AEROSOL, METERED RESPIRATORY (INHALATION) EVERY 6 HOURS PRN
Qty: 8.5 G | Refills: 1 | Status: ON HOLD | OUTPATIENT
Start: 2021-06-11 | End: 2021-07-03 | Stop reason: SDUPTHER

## 2021-06-11 RX ORDER — CARVEDILOL 25 MG/1
25 TABLET ORAL 2 TIMES DAILY WITH MEALS
Status: DISCONTINUED | OUTPATIENT
Start: 2021-06-11 | End: 2021-06-11 | Stop reason: HOSPADM

## 2021-06-11 RX ADMIN — CARVEDILOL 25 MG: 25 TABLET, FILM COATED ORAL at 09:09

## 2021-06-11 RX ADMIN — PREDNISOLONE ACETATE 1 DROP: 10 SUSPENSION/ DROPS OPHTHALMIC at 09:12

## 2021-06-11 RX ADMIN — SEVELAMER HYDROCHLORIDE 800 MG: 800 TABLET, FILM COATED PARENTERAL at 17:43

## 2021-06-11 RX ADMIN — SERTRALINE HYDROCHLORIDE 50 MG: 50 TABLET ORAL at 09:10

## 2021-06-11 RX ADMIN — DORZOLAMIDE HYDROCHLORIDE AND TIMOLOL MALEATE 1 DROP: 20; 5 SOLUTION/ DROPS OPHTHALMIC at 17:42

## 2021-06-11 RX ADMIN — LORATADINE 10 MG: 10 TABLET ORAL at 09:10

## 2021-06-11 RX ADMIN — TORSEMIDE 100 MG: 20 TABLET ORAL at 09:10

## 2021-06-11 RX ADMIN — BENZONATATE 100 MG: 100 CAPSULE ORAL at 12:03

## 2021-06-11 RX ADMIN — BRIMONIDINE TARTRATE 1 DROP: 1.5 SOLUTION OPHTHALMIC at 17:41

## 2021-06-11 RX ADMIN — DORZOLAMIDE HYDROCHLORIDE AND TIMOLOL MALEATE 1 DROP: 20; 5 SOLUTION/ DROPS OPHTHALMIC at 09:09

## 2021-06-11 RX ADMIN — SEVELAMER HYDROCHLORIDE 800 MG: 800 TABLET, FILM COATED PARENTERAL at 12:43

## 2021-06-11 RX ADMIN — NYSTATIN: 100000 POWDER TOPICAL at 09:12

## 2021-06-11 RX ADMIN — BRIMONIDINE TARTRATE 1 DROP: 1.5 SOLUTION OPHTHALMIC at 09:09

## 2021-06-11 RX ADMIN — CINACALCET 30 MG: 30 TABLET ORAL at 09:10

## 2021-06-11 RX ADMIN — CARVEDILOL 25 MG: 25 TABLET, FILM COATED ORAL at 17:41

## 2021-06-11 RX ADMIN — LORAZEPAM 0.5 MG: 2 INJECTION INTRAMUSCULAR; INTRAVENOUS at 02:03

## 2021-06-11 RX ADMIN — LIDOCAINE 1 PATCH: 50 PATCH TOPICAL at 11:23

## 2021-06-11 RX ADMIN — PANTOPRAZOLE SODIUM 40 MG: 40 TABLET, DELAYED RELEASE ORAL at 05:21

## 2021-06-11 RX ADMIN — INSULIN LISPRO 3 UNITS: 100 INJECTION, SOLUTION INTRAVENOUS; SUBCUTANEOUS at 13:00

## 2021-06-11 RX ADMIN — LEVOTHYROXINE SODIUM 50 MCG: 50 TABLET ORAL at 09:12

## 2021-06-11 RX ADMIN — ACETAMINOPHEN 650 MG: 325 TABLET, FILM COATED ORAL at 17:40

## 2021-06-11 RX ADMIN — GUAIFENESIN 600 MG: 600 TABLET, EXTENDED RELEASE ORAL at 09:10

## 2021-06-11 RX ADMIN — LIDOCAINE 1 PATCH: 50 PATCH TOPICAL at 11:24

## 2021-06-11 RX ADMIN — INSULIN LISPRO 3 UNITS: 100 INJECTION, SOLUTION INTRAVENOUS; SUBCUTANEOUS at 17:42

## 2021-06-11 RX ADMIN — ATORVASTATIN CALCIUM 20 MG: 20 TABLET, FILM COATED ORAL at 17:41

## 2021-06-11 RX ADMIN — PREDNISOLONE ACETATE 1 DROP: 10 SUSPENSION/ DROPS OPHTHALMIC at 17:42

## 2021-06-11 RX ADMIN — WARFARIN SODIUM 9 MG: 3 TABLET ORAL at 17:44

## 2021-06-11 RX ADMIN — INSULIN LISPRO 3 UNITS: 100 INJECTION, SOLUTION INTRAVENOUS; SUBCUTANEOUS at 09:11

## 2021-06-11 RX ADMIN — BENZONATATE 100 MG: 100 CAPSULE ORAL at 05:44

## 2021-06-11 RX ADMIN — INSULIN LISPRO 1 UNITS: 100 INJECTION, SOLUTION INTRAVENOUS; SUBCUTANEOUS at 09:11

## 2021-06-11 RX ADMIN — SEVELAMER HYDROCHLORIDE 800 MG: 800 TABLET, FILM COATED PARENTERAL at 09:10

## 2021-06-11 RX ADMIN — PREDNISOLONE ACETATE 1 DROP: 10 SUSPENSION/ DROPS OPHTHALMIC at 11:25

## 2021-06-11 NOTE — PLAN OF CARE
Problem: Potential for Falls  Goal: Patient will remain free of falls  Description: INTERVENTIONS:  - Assess patient frequently for physical needs  -  Identify cognitive and physical deficits and behaviors that affect risk of falls    -  [place] fall precautions as indicated by assessment   - Educate patient/family on patient safety including physical limitations  - Instruct patient to call for assistance with activity based on assessment  - Modify environment to reduce risk of injury  - Consider OT/PT consult to assist with strengthening/mobility  Outcome: Progressing

## 2021-06-11 NOTE — DISCHARGE SUMMARY
[address] [facility]  Discharge- Barb Tse 1965, 49 y o  female MRN: [id number]  Unit/Bed#: MS [facility]-01 Encounter: [id number]  Primary Care Provider: Erin Tristan MD   Date and time admitted to hospital: 6/8/2021  7:07 AM    Type 2 diabetes mellitus [facility])  Assessment & Plan  Lab Results   Component Value Date    HGBA1C 8 4 (H) 06/11/2021     Recent Labs     06/10/21  2103 06/11/21  0626 06/11/21  0937 06/11/21  1113   POCGLU 138 166* 72 105     Blood Sugar Average: Last 72 hrs:  (P) 169 [id number]     · BS labile - received a 1/2 amp of D50 during rapid response  · Decreased Lantus to 20 units  Now BS >300  · Lantus was increased to 24 units however this morning pt was hypoglycemic therefore pt has informed me she will only take 10 units tonight to get back on her home regimen  · Be sure to eat breakfast before hemo in the am   · Add Sliding scale insulin    ESRD on hemodialysis [facility])  Assessment & Plan  · HD every T/Th/Sat pre nephrology  · S/p UF for volume overload  · Nephrology following and appreciate their input      Hypertension  Assessment & Plan  · Labile  · Losartan stopped by Nephrology secondary to hyperkalemia  · Continue carvedilol 25 mg BID and torsemide 100 mg BID        Resolved Problems  Date Reviewed: 6/11/2021          Resolved    Chest pain 6/10/2021     Resolved by  Jesi Starks PA-C    Hyperkalemia 6/10/2021     Resolved by  Jesi Starks PA-C    * (Principal) Stroke-like symptoms 6/11/2021     Resolved by  LOIS Nair    Overview Deleted 6/9/2021  1:19 PM by LOIS Abdi                Discharging Physician / Practitioner: LOIS Nair  PCP: Erin Tristan MD  Admission Date:   Admission Orders (From admission, onward)     Ordered        06/08/21 1326  Inpatient Admission  Once                   Discharge Date: 06/11/21    Consultations During Hospital Stay:  · Dr Salter Pain - Neurology  · Dr Manuel Menon - nephrology Procedures Performed:   · A1c 8 4  · Cholesterol 121, triglycerides 121, hdl 44, non hdl 77 and ldl 53  · mrsa nasal culture positive   · covid 6/8: negative  · cta chest with iv contrast 6/8: Evaluation of the pulmonary arteries somewhat limited due to respiratory motion   No acute pulmonary embolus identified  No acute abnormality identified  · Ct stroke alert 6/9:No acute intracranial abnormality      1 4 cm paramedian left frontal vertex meningioma  ·  cta stroke alert 6/9: Study degraded by body habitus  No large vessel flow restrictive disease within the head or neck      14 mm left frontal parasagittal meningioma  · MRI brain 6/11:1   No acute ischemic disease  2   Nonspecific periventricular and subcortical white matter FLAIR hyperintense foci favoring precocious microangiopathy given underlying risk factors   Post infectious, inflammatory, or demyelinating etiologies are less favored differential   considerations  3   Numerous foci of gradient susceptibility blooming signal involving bilateral cerebral hemispheres likely representing foci of chronic microhemorrhage which can be seen in cerebral amyloid angiopathy  4   Parasagittal left frontal lobe 15 mm meningioma       Significant Findings / Test Results:   · See above     Incidental Findings:   · 14 mm left frontal parasagittal meningioma  (would have pt set up to see neurosurgery for ongoing monitoring of meningioma)      Test Results Pending at Discharge (will require follow up):   · none     Outpatient Tests Requested:  · Pt should follow up in 3 months with neurosurgery for meningioma and to monitor     Complications:  none    Reason for Admission: chest pain     Hospital Course:   Whit Nvooa is a 49 y o  female patient who originally presented to the hospital on 6/8/2021 due to chest pain  Patient is known history of end-stage renal disease on hemodialysis receiving treatments Tuesday Thursday Saturday    Her last treatment was this past Saturday prior to admission she presented with a sensation of chest pressure and shortness of breath found to have potassium 7 8 on presentation and patient was also noted to be profoundly fluid overloaded requiring urgent hemodialysis from the ED  Patient was sent to dialysis treatment right away  Patient was seen by Nephrology she denied any changes in her diet was noted to be about 5 kilos above her dry weight also having labile blood pressures during dialysis  Patient's hyperkalemia on admission was treated medically in the emergency room losartan was discontinued imaging was negative for PE on admission  During her admission while on dialysis patient did have episode which presented with stroke-like symptoms  Stroke alert was called neurology were consulted patient underwent the above-mentioned imaging CT head CTA and MRI of the brain with no notation of stroke  During this time patient's blood pressures were noted to be low as well as blood sugars low suspected to be likely cause for patient's overall symptoms  Symptoms at that time with garbled speech  Neurology responded to patient's alert reviewed case and imaging noting that incidental identification of hemosiderin deposits were consistent with potential underlying amyloid angiopathy  They did not feel that this was new felt that it had been there for some time  Recommended ongoing use of Coumadin therapy  Would recommend that patient follow-up with INR this coming Monday with Coumadin to be adjusted as previously adjusted  In review of patient's med list I would recommend that PCP go through patient's medications as she clearly seems confused by what medications she should be taking  And in review of patient's PCP notations in prior notes patient's medication reconciliation is difficult to follow  And patient clearly is uncertain of what medication she should be taking    Patient's goal INR should be kept at 2-2 5 per neurology's review patient's losartan was discontinued  Per Neurology and Nephrology patient is medically stable for discharge discussion was had with patient and she was agreeable as her son would be coming to get her  Patient should follow-up with PCP within the next week Call to schedule follow-up with Nephrology and Neurology  And patient should follow-up with Neurosurgery in regards to new  meningioma findings        Please see above list of diagnoses and related plan for additional information  Condition at Discharge: fair    Discharge Day Visit / Exam:   * Please refer to separate progress note for these details *    Discussion with Family: Patient declined call to   Discharge instructions/Information to patient and family:   See after visit summary for information provided to patient and family  Provisions for Follow-Up Care:  See after visit summary for information related to follow-up care and any pertinent home health orders  Disposition:   Home    Planned Readmission: no plan for readmission      Discharge Statement:  I spent 60 minutes discharging the patient  This time was spent on the day of discharge  I had direct contact with the patient on the day of discharge  Greater than 50% of the total time was spent examining patient, answering all patient questions, arranging and discussing plan of care with patient as well as directly providing post-discharge instructions  Additional time then spent on discharge activities  Discharge Medications:  See after visit summary for reconciled discharge medications provided to patient and/or family        **Please Note: This note may have been constructed using a voice recognition system**

## 2021-06-11 NOTE — PROGRESS NOTES
NEPHROLOGY PROGRESS NOTE   Mitch Rodrigez 49 y o  female MRN: [id number]  Unit/Bed#: -01 Encounter: [id number]      ASSESSMENT/PLAN:  1  ESRD on HD: on HD TTS at [facility] [address]   · Had urgent dialysis on admission due to hyperkalemia  · Thursday had an episode of dysarthria post dialysis and stroke alert was called  · Patient requesting shorter treatment but we discussed the importance of staying on the machine for 4 hours due to significant hyperkalemia and frequent volume overload as an outpatient  · Next dialysis tomorrow  2  Hyperkalemia: K 7 8 on admission  Due to noncompliance with full dialysis treatment (had <3 hours on Saturday) and dietary noncompliance at home  · Resolved with dialysis  · Low-potassium diet  · Would keep off losartan  3  Chest pain:  Likely due to hyperkalemia  CTA was negative for PE  4  Volume overload:  improved with HD   5  Hypertension:  Monitor blood pressure closely with dialysis as her blood pressure often drops with volume removal  6  Anemia of CKD:  Hemoglobin 9    · Not on MITCHELL with history of PE  7  Mineral bone disease of CKD:  Continue Hectorol 2 mcg with dialysis, Sensipar 30 mg daily, Renvela 3 tablets with meals and 2 with snacks  8  Stroke-like symptoms:  Neurology workup pending  9  Access: LUE AVF working well     Plan Summary:    Next dialysis treatment tomorrow  Dana Angela to discharge from renal standpoint once cleared by Neurology and primary team    SUBJECTIVE:  Feeling okay but very anxious that she will have another episode like she had in dialysis on Thursday  No chest pain or shortness of breath      OBJECTIVE:  Current Weight: Weight - Scale: 127 kg (278 lb 15 7 oz)  Vitals:    06/11/21 0737   BP: 149/54   Pulse: 72   Resp: 18   Temp: 98 °F (36 7 °C)   SpO2: 98%       Intake/Output Summary (Last 24 hours) at 6/11/2021 1035  Last data filed at 6/10/2021 1701  Gross per 24 hour   Intake 420 ml   Output 977 ml   Net -557 ml       General:  No acute distress  Skin:  No rash  Eyes:  Sclerae anicteric  ENT:  Moist mucous membranes  Neck:  Trachea midline   Chest:  Clear to auscultation bilaterally  CVS:  Regular rate and rhythm  Abdomen:  Soft, nontender, nondistended  Extremities:  No edema , L AVF with bruit and thrill   Neuro:  Awake and alert  Psych:  Appropriate affect      Medications:  Scheduled Meds:  Current Facility-Administered Medications   Medication Dose Route Frequency Provider Last Rate    acetaminophen  650 mg Oral Q6H PRN Hetul Andrea, DO      albuterol  2 puff Inhalation Q6H PRN Hetul Andrea, DO      ALPRAZolam  0 25 mg Oral BID PRN Hetul Andrea, DO      atorvastatin  20 mg Oral QPM Hetul Andrea, DO      atropine  1 drop Both Eyes HS Hetul Andrea, DO      benzonatate  100 mg Oral TID PRN Melita Villasenor PA-C      brimonidine  1 drop Both Eyes BID Hetul Andrea, DO      carvedilol  25 mg Oral BID With Meals Hetul Andrea, DO      cinacalcet  30 mg Oral Daily Hetul Andrea, DO      dorzolamide-timolol  1 drop Both Eyes BID Hetul Andrea, DO      gabapentin  300 mg Oral HS Hetul Andrea, DO      guaiFENesin  600 mg Oral Q12H [facility] Ananya Limb, PA-C      insulin glargine  24 Units Subcutaneous HS [facility] Limb, PA-JASON      insulin lispro  1-5 Units Subcutaneous TID AC [facility] Limb, PA-JASON      insulin lispro  1-5 Units Subcutaneous HS [facility] Limb, PA-JASON      insulin lispro  3 Units Subcutaneous TID With Meals Hetul Andrea, DO      latanoprost  1 drop Both Eyes HS Hetul Andrea, DO      levothyroxine  50 mcg Oral Daily Hetul Andrea, DO      lidocaine  1 patch Topical Daily Hetul Andrea, DO      lidocaine  1 patch Topical Daily Ellyn Aschoff, PA-C      loratadine  10 mg Oral Daily Hetul Andrea, DO      LORazepam  1 mg Oral Once in imaging Hetul Maggi, DO      nystatin   Topical BID Hetul Andrea, DO      ondansetron  4 mg Oral Q4H PRN Hetul Andrea, DO      pantoprazole  40 mg Oral Early Morning Hetul Andrea, DO      phenol  1 spray Mouth/Throat Q2H PRN Cierra Banks PA-C      prednisoLONE acetate  1 drop Both Eyes 4x Daily Hetul Andrea, DO      senna  2 tablet Oral HS PRN Hetul Andrea, DO      sertraline  50 mg Oral Daily Hetul Andrea, DO      sevelamer  800 mg Oral TID With Meals Hetpadmini Andrea, DO      sodium chloride  1 spray Each Nare Q1H PRN Melita Villasenor PA-C      torsemide  100 mg Oral BID Melita Villasenor PA-C      traMADol  50 mg Oral BID PRN Dk Lopez PA-C      warfarin  9 mg Oral Daily (warfarin) Hetpadmini Andrea, DO         PRN Meds:   acetaminophen    albuterol    ALPRAZolam    benzonatate    LORazepam    ondansetron    phenol    senna    sodium chloride    traMADol    Laboratory Results:  Results from last 7 days   Lab Units 06/10/21  0752 06/09/21  1347 06/09/21  0517 06/08/21  0759   WBC Thousand/uL  --   --  7 03 6 76   HEMOGLOBIN g/dL  --   --  8 9* 9 0*   HEMATOCRIT %  --   --  27 2* 27 6*   PLATELETS Thousands/uL  --   --  125* 147*   SODIUM mmol/L 133* 133* 133* 134*   POTASSIUM mmol/L 4 8 4 5 5 0 7 8*   CHLORIDE mmol/L 98* 98* 97* 104   CO2 mmol/L 27 30 30 26   BUN mg/dL 40* 25 44* 95*   CREATININE mg/dL 6 39* 4 57* 6 38* 10 30*   CALCIUM mg/dL 8 3 8 1* 8 2* 8 3

## 2021-06-11 NOTE — ASSESSMENT & PLAN NOTE
Lab Results   Component Value Date    HGBA1C 8 4 (H) 06/11/2021     Recent Labs     06/10/21  2103 06/11/21  0626 06/11/21  0937 06/11/21  1113   POCGLU 138 166* 72 105     Blood Sugar Average: Last 72 hrs:  (P) 169 [id number]     · BS labile - received a 1/2 amp of D50 during rapid response  · Decreased Lantus to 20 units    Now BS >300  · Lantus was increased to 24 units will decrease dt hypoglycemia this am and tomorrow pt will go to hemo therefore do not want further episode   · Be sure to eat breakfast before hemo in the am   · Add Sliding scale insulin

## 2021-06-11 NOTE — ASSESSMENT & PLAN NOTE
· Stroke alert called during HD after she developed slurred speech, possible facial droop, and left sided drift and ataxia  · Neurology consulted and appreciate their input  · However most likely secondary to severe hypotension and hypoglycemia at hemodialysis   · Meningioma noted on CT head will need outpt follow up with neurosurgery for monitoring   · Routine EEG: Diffuse cerebral dysfunction with focal neuronal dysfunction in the left temporal region  No epileptiform discharged noted  · MRI brain: 1   No acute ischemic disease  2   Nonspecific periventricular and subcortical white matter FLAIR hyperintense foci favoring precocious microangiopathy given underlying risk factors   Post infectious, inflammatory, or demyelinating etiologies are less favored differential   considerations  3   Numerous foci of gradient susceptibility blooming signal involving bilateral cerebral hemispheres likely representing foci of chronic microhemorrhage which can be seen in cerebral amyloid angiopathy  4   Parasagittal left frontal lobe 15 mm meningioma     · Coumadin therapeutic (DVT history)  - recommend continuation   · - continue home dose lipitor, telemetry will be discontinued at this time , monitor sbp maintain normotension (do not administer hypotensive meds prior to hemo) , maintain blood sugars

## 2021-06-11 NOTE — ASSESSMENT & PLAN NOTE
Lab Results   Component Value Date    HGBA1C 8 4 (H) 06/11/2021     Recent Labs     06/10/21  2103 06/11/21  0626 06/11/21  0937 06/11/21  1113   POCGLU 138 166* 72 105     Blood Sugar Average: Last 72 hrs:  (P) 169 [id number]     · BS labile - received a 1/2 amp of D50 during rapid response  · Decreased Lantus to 20 units    Now BS >300  · Lantus was increased to 24 units however this morning pt was hypoglycemic therefore pt has informed me she will only take 10 units tonight to get back on her home regimen  · Be sure to eat breakfast before hemo in the am   · Add Sliding scale insulin

## 2021-06-11 NOTE — CASE MANAGEMENT
Patient reviewed during care coordination rounds with Starr Blake  Pt to get dialysis tomorrow, already established at [facility], with a TTS schedule  Pt's blood sugar low  Likely discharge in 24 to 48 hours pending neurology plan  CM department to remain available for discharge concerns or needs

## 2021-06-11 NOTE — PROGRESS NOTES
[address] [facility]  Progress Note Marce Strong 1965, 49 y o  female MRN: [id number]  Unit/Bed#: -01 Encounter: [id number]  Primary Care Provider: Michelle Michael MD   Date and time admitted to hospital: 6/8/2021  7:07 AM    * Stroke-like symptoms  Assessment & Plan  · Stroke alert called during HD after she developed slurred speech, possible facial droop, and left sided drift and ataxia  · Neurology consulted and appreciate their input  · However most likely secondary to severe hypotension and hypoglycemia at hemodialysis   · Meningioma noted on CT head will need outpt follow up with neurosurgery for monitoring   · Routine EEG: Diffuse cerebral dysfunction with focal neuronal dysfunction in the left temporal region  No epileptiform discharged noted  · MRI brain: 1   No acute ischemic disease  2   Nonspecific periventricular and subcortical white matter FLAIR hyperintense foci favoring precocious microangiopathy given underlying risk factors   Post infectious, inflammatory, or demyelinating etiologies are less favored differential   considerations  3   Numerous foci of gradient susceptibility blooming signal involving bilateral cerebral hemispheres likely representing foci of chronic microhemorrhage which can be seen in cerebral amyloid angiopathy  4   Parasagittal left frontal lobe 15 mm meningioma     · Coumadin therapeutic (DVT history)  - recommend continuation   · - continue home dose lipitor, telemetry will be discontinued at this time , monitor sbp maintain normotension (do not administer hypotensive meds prior to hemo) , maintain blood sugars     Type 2 diabetes mellitus [facility])  Assessment & Plan  Lab Results   Component Value Date    HGBA1C 8 4 (H) 06/11/2021     Recent Labs     06/10/21  2103 06/11/21  0626 06/11/21  0937 06/11/21  1113   POCGLU 138 166* 72 105     Blood Sugar Average: Last 72 hrs:  (P) 019 [id number]     · BS labile - received a 1/2 amp of D50 during rapid response  · Decreased Lantus to 20 units  Now BS >300  · Lantus was increased to 24 units will decrease dt hypoglycemia this am and tomorrow pt will go to hemo therefore do not want further episode   · Be sure to eat breakfast before hemo in the am   · Add Sliding scale insulin        VTE Pharmacologic Prophylaxis: VTE Score: 5 High Risk (Score >/= 5) - Pharmacological DVT Prophylaxis Ordered: warfarin (Coumadin)  Sequential Compression Devices Ordered  Patient Centered Rounds: I performed bedside rounds with nursing staff today  Discussions with Specialists or Other Care Team Provider: nursing     Education and Discussions with Family / Patient: Patient declined call to   Time Spent for Care: 45 minutes  More than 50% of total time spent on counseling and coordination of care as described above  Current Length of Stay: 3 day(s)  Current Patient Status: Inpatient   Certification Statement: The patient will continue to require additional inpatient hospital stay due to ongoing monitoring monitor hemo tomorrow if able to tolerate   Discharge Plan: Anticipate discharge tomorrow to home  after hemo     Code Status: Level 1 - Full Code    Subjective:   Pt is doing a little better today does report some sob at times states that she never takes her blood pressure medications prior to hemo she will take them after she returns from hemo     Objective:     Vitals:   Temp (24hrs), Av 4 °F (36 9 °C), Min:98 °F (36 7 °C), Max:99 1 °F (37 3 °C)    Temp:  [98 °F (36 7 °C)-99 1 °F (37 3 °C)] 98 °F (36 7 °C)  HR:  [72-79] 72  Resp:  [12-18] 18  BP: (102-173)/(46-94) 149/54  SpO2:  [95 %-98 %] 98 %  Body mass index is 45 03 kg/m²  Input and Output Summary (last 24 hours):      Intake/Output Summary (Last 24 hours) at 2021 1351  Last data filed at 2021 0900  Gross per 24 hour   Intake 300 ml   Output --   Net 300 ml       Physical Exam:   Physical Exam  Constitutional: General: She is not in acute distress  Appearance: She is obese  She is not ill-appearing, toxic-appearing or diaphoretic  HENT:      Head: Normocephalic  Mouth/Throat:      Pharynx: Oropharynx is clear  No oropharyngeal exudate  Eyes:      General:         Right eye: No discharge  Left eye: No discharge  Conjunctiva/sclera: Conjunctivae normal    Cardiovascular:      Rate and Rhythm: Normal rate  Heart sounds: No murmur  No friction rub  No gallop  Pulmonary:      Effort: No respiratory distress  Breath sounds: No stridor  No wheezing, rhonchi or rales  Chest:      Chest wall: No tenderness  Abdominal:      General: There is no distension  Palpations: There is no mass  Tenderness: There is no abdominal tenderness  There is no right CVA tenderness, left CVA tenderness, guarding or rebound  Hernia: No hernia is present  Musculoskeletal:         General: No swelling, tenderness, deformity or signs of injury  Right lower leg: No edema  Left lower leg: No edema  Skin:     Coloration: Skin is not jaundiced or pale  Findings: No bruising, erythema, lesion or rash  Neurological:      Mental Status: She is alert and oriented to person, place, and time     Psychiatric:         Behavior: Behavior normal          Additional Data:     Labs:  Results from last 7 days   Lab Units 06/09/21  0517   WBC Thousand/uL 7 03   HEMOGLOBIN g/dL 8 9*   HEMATOCRIT % 27 2*   PLATELETS Thousands/uL 125*   NEUTROS PCT % 79*   LYMPHS PCT % 10*   MONOS PCT % 8   EOS PCT % 2     Results from last 7 days   Lab Units 06/10/21  0752   SODIUM mmol/L 133*   POTASSIUM mmol/L 4 8   CHLORIDE mmol/L 98*   CO2 mmol/L 27   BUN mg/dL 40*   CREATININE mg/dL 6 39*   ANION GAP mmol/L 8   CALCIUM mg/dL 8 3   GLUCOSE RANDOM mg/dL 181*     Results from last 7 days   Lab Units 06/10/21  0752   INR  1 87*     Results from last 7 days   Lab Units 06/11/21  1113 06/11/21  4020 06/11/21  0626 06/10/21  2103 06/10/21  1623 06/10/21  1131 06/10/21  0627 06/09/21  2107 06/09/21  1554 06/09/21  1311 06/09/21  1300 06/09/21  1155   POC GLUCOSE mg/dl 105 72 166* 138 155* 379* 115 174* 181* 166* 153* 87     Results from last 7 days   Lab Units 06/11/21  0552   HEMOGLOBIN A1C % 8 4*           Lines/Drains:  Invasive Devices     Peripheral Intravenous Line            Peripheral IV 06/09/21 Right;Ventral (anterior) Forearm 2 days    Peripheral IV 06/11/21 Right;Ventral (anterior) Foot less than 1 day          Line            Hemodialysis AV Fistula 02/20/18 Left Forearm 1206 days                      Imaging: Reviewed radiology reports from this admission including: MRI brain and Personally reviewed the following imaging: CT head    Recent Cultures (last 7 days):         Last 24 Hours Medication List:   Current Facility-Administered Medications   Medication Dose Route Frequency Provider Last Rate    acetaminophen  650 mg Oral Q6H PRN Hetul Andrea, DO      albuterol  2 puff Inhalation Q6H PRN Hetul Andrea, DO      ALPRAZolam  0 25 mg Oral BID PRN Hetul Andrea, DO      atorvastatin  20 mg Oral QPM Hetul Andrea, DO      atropine  1 drop Both Eyes HS Hetul Andrea, DO      benzonatate  100 mg Oral TID PRN Melita Villasenor PA-C      brimonidine  1 drop Both Eyes BID Hetul Andrea, DO      carvedilol  25 mg Oral BID With Meals LOIS Allred      cinacalcet  30 mg Oral Daily Hetul Andrea, DO      dorzolamide-timolol  1 drop Both Eyes BID Hetul Andrea, DO      gabapentin  300 mg Oral HS Hetul Andrea, DO      guaiFENesin  600 mg Oral Q12H [address] Daniel Briscoe PA-C      insulin glargine  18 Units Subcutaneous HS LOIS Allred      insulin lispro  1-5 Units Subcutaneous TID AC Daniel Briscoe PA-C      insulin lispro  1-5 Units Subcutaneous HS Daniel Briscoe PA-C      insulin lispro  3 Units Subcutaneous TID With Meals Hetpadmini Andrea, DO      latanoprost  1 drop Both Eyes HS Hetul Andrea, DO      levothyroxine  50 mcg Oral Daily Hetul Andrea, DO      lidocaine  1 patch Topical Daily Hetul Andrea, DO      lidocaine  1 patch Topical Daily Brandyn Castaneda PA-C      loratadine  10 mg Oral Daily Hetul Andrea, DO      LORazepam  1 mg Oral Once in imaging Hetul Jeraline Lombardi, DO      nystatin   Topical BID Hetul Andrea, DO      ondansetron  4 mg Oral Q4H PRN Hetul Andrea, DO      pantoprazole  40 mg Oral Early Morning Hetul Andrea, DO      phenol  1 spray Mouth/Throat Q2H PRN Payton Finn PA-C      prednisoLONE acetate  1 drop Both Eyes 4x Daily Hetul Andrea, DO      senna  2 tablet Oral HS PRN Hetul Andrea, DO      sertraline  50 mg Oral Daily Hetul Andrea, DO      sevelamer  800 mg Oral TID With Meals Hetul Andrea, DO      sodium chloride  1 spray Each Nare Q1H PRN Melita Villasenor PA-C      torsemide  100 mg Oral BID Melita Villasenor PA-C      traMADol  50 mg Oral BID PRN Brandyn Castaneda PA-C      warfarin  9 mg Oral Daily (warfarin) Hetul Andrea, DO          Today, Patient Was Seen By: LOIS Cheema    **Please Note: This note may have been constructed using a voice recognition system  **

## 2021-06-11 NOTE — ASSESSMENT & PLAN NOTE
Lab Results   Component Value Date    HGBA1C 8 4 (H) 06/11/2021       Recent Labs     06/10/21  1131 06/10/21  1623 06/10/21  2103 06/11/21  0626   POCGLU 379* 155* 138 166*       Blood Sugar Average: Last 72 hrs:  (P) 180 8

## 2021-06-11 NOTE — ASSESSMENT & PLAN NOTE
· HD every T/Th/Sat pre nephrology  · S/p UF for volume overload  · Nephrology following and appreciate their input

## 2021-06-11 NOTE — PROGRESS NOTES
Progress Note - Neurology   Noa Jackson 49 y o  female MRN: [id number]  Unit/Bed#: -01 Encounter: [id number]      Assessment/Plan   * Stroke-like symptoms  Assessment & Plan  Noa Jackson is a 49 y o  female with ESRD on dialysis, DM type 2, h/o DVT/PE on Coumadin who initially was admitted with chest pain  After dialysis on 6/9/2021 a RRT was called for acute onset of aphasia and left facial droop followed by a stroke alert at 1159  NIHSS 6 for speech difficulties, slight left facial droop, LUE/LLE drift, LUE ataxia, and neglect in RUE/RLE  Blood glucose 87 and   · CTA head and neck with no LVO or hemodynamically critical stenosis  · CT head completed with no acute intracranial pathology but demonstrating 1 4 cm paramedian left frontal vertex meningioma  · No IV tPa given due to bleeding risk  Patient on coumadin with today's INR 2 46    · Routine EEG: occasional left temporal delta slowing and slowing of the posteriorly dominant rhythm suggestive of mild nonspecific diffuse cerebral dysfunction, but no epileptiform discharges  · MRI brain:   · No acute infarct  · Nonspecific periventricular and subcortical white matter FLAIR hyperintense foci favoring precocious microangiopathy  · Numerous foci of gradient susceptibility blooming signal involving bilateral cerebral hemispheres likely representing foci of chronic microhemorrhage which can be seen in cerebral amyloid angiopathy  · Parasagittal left frontal lobe 15 mm meningioma   · Hemoglobin A1c 8 4    Patient reports chronic Right eyes visual deficits affecting all quadrants  Reports symptoms she experienced at the time of the stroke alert resolved, attributing it to "blood sugar and blood pressure were low" during the event  Strong suspicion for dialysis related hypotension       Plan:   - Continue warfarin  - Continue home Lipitor 20 mg daily  - Telemetry monitoring for arrhythmia  - Normotensive goal; avoid hypotension  - Maintain euglycemia and normothermia  - PT/OT/ST  - Frequent neurological checks, Stat CT head with acute decline of in exam/mental status; Notify neurology with any changes in examination  Type 2 diabetes mellitus [facility])  Assessment & Plan  Lab Results   Component Value Date    HGBA1C 8 4 (H) 06/11/2021       Recent Labs     06/10/21  1131 06/10/21  1623 06/10/21  2103 06/11/21  0626   POCGLU 379* 155* 138 166*       Blood Sugar Average: Last 72 hrs:  (P) 180 8    Recommendations for outpatient neurological follow up have yet to be determined  Subjective:   Patient reports that she has not had any recurrence of the word-finding difficulties that she experienced the other day at dialysis  Patient does report that she is unable to visualize anything out of her right eye, stating she can only see movements and bright lights  Denies CP, SOB, headache, dizziness, vision changes, N/V, abdominal pain, weakness or numbness  ROS:  12 point ROS performed, as stated above, all others negative  Vitals: Blood pressure 149/54, pulse 72, temperature 98 °F (36 7 °C), temperature source Oral, resp  rate 18, height 5' 6" (1 676 m), weight 127 kg (278 lb 15 7 oz), last menstrual period 02/12/2016, SpO2 98 %, not currently breastfeeding  ,Body mass index is 45 03 kg/m²  Physical Exam  Vitals signs and nursing note reviewed  Constitutional:       General: She is not in acute distress  Appearance: Normal appearance  She is not ill-appearing, toxic-appearing or diaphoretic  HENT:      Head: Normocephalic and atraumatic  Eyes:      General: No scleral icterus  Right eye: No discharge  Left eye: No discharge  Extraocular Movements: Extraocular movements intact and EOM normal       Conjunctiva/sclera: Conjunctivae normal    Neck:      Musculoskeletal: Normal range of motion and neck supple  Musculoskeletal: Normal range of motion  Skin:     General: Skin is warm and dry        Coloration: Skin is not jaundiced or pale  Findings: Bruising present  No erythema, lesion or rash  Neurological:      Mental Status: She is alert  Coordination: Finger-Nose-Finger Test normal    Psychiatric:         Mood and Affect: Mood normal          Behavior: Behavior normal          Thought Content: Thought content normal          Judgment: Judgment normal        Neurologic Exam     Mental Status   Patient is alert, lying in bed  Oriented x 3  No dysarthria or aphasia noted  Able to name the president and names all objects provided  Follows central and appendicular commands  Answers all questions appropriately  Cranial Nerves     CN III, IV, VI   Extraocular motions are normal    Nystagmus: none   Upgaze: normal  Downgaze: normal  Conjugate gaze: present    CN V   Facial sensation intact  CN VII   Facial expression full, symmetric  CN VIII   Hearing: intact    CN XI   CN XI normal      CN XII   Tongue deviation: none  Visual field deficit in all quadrants of right eye, able to see shadows/movement; absent blink to threat  No visual field deficits noted in left eye     Motor Exam   Muscle bulk: normal  Overall muscle tone: normal  Right arm pronator drift: absent  Left arm pronator drift: absent   strength symmetric, 5/5 bilaterally  Bilateral upper and lower extremity strength testing 5/5 throughout     Sensory Exam   Light touch normal    No evidence of right sided neglect noted on exam with bilateral simultaneous stimulation of upper and lower extremities      Gait, Coordination, and Reflexes     Coordination   Finger to nose coordination: normal    Tremor   Resting tremor: absent  No ataxia or dysmetria on bilateral upper extremity finger-to-nose testing     Lab Results: I have personally reviewed pertinent reports    Recent Results (from the past 24 hour(s))   Fingerstick Glucose (POCT)    Collection Time: 06/10/21  4:23 PM   Result Value Ref Range    POC Glucose 155 (H) 65 - 140 mg/dl   Fingerstick Glucose (POCT)    Collection Time: 06/10/21  9:03 PM   Result Value Ref Range    POC Glucose 138 65 - 140 mg/dl   Lipid panel    Collection Time: 06/11/21  5:52 AM   Result Value Ref Range    Cholesterol 121 50 - 200 mg/dL    Triglycerides 121 <=150 mg/dL    HDL, Direct 44 >=40 mg/dL    LDL Calculated 53 0 - 100 mg/dL    Non-HDL-Chol (CHOL-HDL) 77 mg/dl   Hemoglobin A1C    Collection Time: 06/11/21  5:52 AM   Result Value Ref Range    Hemoglobin A1C 8 4 (H) Normal 3 8-5 6%; PreDiabetic 5 7-6 4%; Diabetic >=6 5%; Glycemic control for adults with diabetes <7 0% %     mg/dl   TSH, 3rd generation with Free T4 reflex    Collection Time: 06/11/21  5:52 AM   Result Value Ref Range    TSH 3RD GENERATON 2 280 0 358 - 3 740 uIU/mL   Fingerstick Glucose (POCT)    Collection Time: 06/11/21  6:26 AM   Result Value Ref Range    POC Glucose 166 (H) 65 - 140 mg/dl   Fingerstick Glucose (POCT)    Collection Time: 06/11/21  9:37 AM   Result Value Ref Range    POC Glucose 72 65 - 140 mg/dl   Fingerstick Glucose (POCT)    Collection Time: 06/11/21 11:13 AM   Result Value Ref Range    POC Glucose 105 65 - 140 mg/dl   ]  Imaging Studies: I have personally reviewed pertinent reports and I have personally reviewed pertinent films in PACS  EKG, Pathology, and Other Studies: I have personally reviewed pertinent reports    VTE Prophylaxis: Warfarin (Coumadin)    Counseling / Coordination of Care  Total time spent today 32 minutes  Greater than 50% of total time was spent with the patient and/or family counseling and/or coordination of care  A description of the counseling/coordination of care:  Patient was seen and evaluated  Discussed with attending  Chart reviewed thoroughly including laboratory and imaging studies  Plan of care discussed with patient and primary team   Discussed MRI brain results with the patient

## 2021-06-23 ENCOUNTER — APPOINTMENT (EMERGENCY)
Dept: RADIOLOGY | Facility: HOSPITAL | Age: 56
DRG: 177 | End: 2021-06-23
Payer: MEDICARE

## 2021-06-23 ENCOUNTER — HOSPITAL ENCOUNTER (INPATIENT)
Facility: HOSPITAL | Age: 56
LOS: 3 days | Discharge: HOME | DRG: 177 | End: 2021-06-26
Attending: EMERGENCY MEDICINE | Admitting: INTERNAL MEDICINE
Payer: MEDICARE

## 2021-06-23 ENCOUNTER — CONSULT (OUTPATIENT)
Dept: VASCULAR SURGERY | Facility: CLINIC | Age: 56
End: 2021-06-23
Payer: MEDICARE

## 2021-06-23 ENCOUNTER — HOSPITAL ENCOUNTER (OUTPATIENT)
Dept: RADIOLOGY | Facility: HOSPITAL | Age: 56
Discharge: HOME | DRG: 177 | End: 2021-06-23
Payer: MEDICARE

## 2021-06-23 VITALS
WEIGHT: 275 LBS | HEART RATE: 77 BPM | SYSTOLIC BLOOD PRESSURE: 146 MMHG | DIASTOLIC BLOOD PRESSURE: 76 MMHG | HEIGHT: 66 IN | BODY MASS INDEX: 44.2 KG/M2 | TEMPERATURE: 98.6 F

## 2021-06-23 DIAGNOSIS — I77.0 A-V FISTULA (HCC): ICD-10-CM

## 2021-06-23 DIAGNOSIS — Z99.2 ESRD ON HEMODIALYSIS (HCC): Chronic | ICD-10-CM

## 2021-06-23 DIAGNOSIS — M79.602 PAIN OF LEFT UPPER EXTREMITY: ICD-10-CM

## 2021-06-23 DIAGNOSIS — J18.9 HEALTHCARE-ASSOCIATED PNEUMONIA: ICD-10-CM

## 2021-06-23 DIAGNOSIS — I10 ESSENTIAL HYPERTENSION: ICD-10-CM

## 2021-06-23 DIAGNOSIS — Z99.2 ARTERIOVENOUS FISTULA FOR HEMODIALYSIS IN PLACE, PRIMARY (HCC): Primary | ICD-10-CM

## 2021-06-23 DIAGNOSIS — N18.6 ESRD ON HEMODIALYSIS (HCC): Primary | ICD-10-CM

## 2021-06-23 DIAGNOSIS — N18.6 ESRD ON HEMODIALYSIS (HCC): Chronic | ICD-10-CM

## 2021-06-23 DIAGNOSIS — Z99.2 ESRD ON HEMODIALYSIS (HCC): Primary | ICD-10-CM

## 2021-06-23 DIAGNOSIS — R05.9 COUGH: ICD-10-CM

## 2021-06-23 LAB
ALBUMIN SERPL BCP-MCNC: 3 G/DL (ref 3.5–5)
ALP SERPL-CCNC: 184 U/L (ref 46–116)
ALT SERPL W P-5'-P-CCNC: 23 U/L (ref 12–78)
ANION GAP SERPL CALCULATED.3IONS-SCNC: 10 MMOL/L (ref 4–13)
AST SERPL W P-5'-P-CCNC: 16 U/L (ref 5–45)
BASOPHILS # BLD AUTO: 0.04 THOUSANDS/ΜL (ref 0–0.1)
BASOPHILS NFR BLD AUTO: 0 % (ref 0–1)
BILIRUB SERPL-MCNC: 0.57 MG/DL (ref 0.2–1)
BUN SERPL-MCNC: 47 MG/DL (ref 5–25)
CALCIUM ALBUM COR SERPL-MCNC: 9.6 MG/DL (ref 8.3–10.1)
CALCIUM SERPL-MCNC: 8.8 MG/DL (ref 8.3–10.1)
CHLORIDE SERPL-SCNC: 97 MMOL/L (ref 100–108)
CO2 SERPL-SCNC: 27 MMOL/L (ref 21–32)
CREAT SERPL-MCNC: 7.47 MG/DL (ref 0.6–1.3)
D DIMER PPP FEU-MCNC: 1.33 UG/ML FEU
EOSINOPHIL # BLD AUTO: 0.04 THOUSAND/ΜL (ref 0–0.61)
EOSINOPHIL NFR BLD AUTO: 0 % (ref 0–6)
ERYTHROCYTE [DISTWIDTH] IN BLOOD BY AUTOMATED COUNT: 12.9 % (ref 11.6–15.1)
GFR SERPL CREATININE-BSD FRML MDRD: 6 ML/MIN/1.73SQ M
GLUCOSE SERPL-MCNC: 238 MG/DL (ref 65–140)
HCT VFR BLD AUTO: 27.4 % (ref 34.8–46.1)
HGB BLD-MCNC: 8.9 G/DL (ref 11.5–15.4)
IMM GRANULOCYTES # BLD AUTO: 0.09 THOUSAND/UL (ref 0–0.2)
IMM GRANULOCYTES NFR BLD AUTO: 1 % (ref 0–2)
INR PPP: 1.84 (ref 0.84–1.19)
LYMPHOCYTES # BLD AUTO: 1.06 THOUSANDS/ΜL (ref 0.6–4.47)
LYMPHOCYTES NFR BLD AUTO: 10 % (ref 14–44)
MCH RBC QN AUTO: 31 PG (ref 26.8–34.3)
MCHC RBC AUTO-ENTMCNC: 32.5 G/DL (ref 31.4–37.4)
MCV RBC AUTO: 96 FL (ref 82–98)
MONOCYTES # BLD AUTO: 0.76 THOUSAND/ΜL (ref 0.17–1.22)
MONOCYTES NFR BLD AUTO: 7 % (ref 4–12)
NEUTROPHILS # BLD AUTO: 8.45 THOUSANDS/ΜL (ref 1.85–7.62)
NEUTS SEG NFR BLD AUTO: 82 % (ref 43–75)
NRBC BLD AUTO-RTO: 0 /100 WBCS
PLATELET # BLD AUTO: 214 THOUSANDS/UL (ref 149–390)
PMV BLD AUTO: 10.7 FL (ref 8.9–12.7)
POTASSIUM SERPL-SCNC: 4.8 MMOL/L (ref 3.5–5.3)
PROT SERPL-MCNC: 7.8 G/DL (ref 6.4–8.2)
PROTHROMBIN TIME: 21.2 SECONDS (ref 11.6–14.5)
RBC # BLD AUTO: 2.87 MILLION/UL (ref 3.81–5.12)
SARS-COV-2 RNA RESP QL NAA+PROBE: NEGATIVE
SODIUM SERPL-SCNC: 134 MMOL/L (ref 136–145)
TROPONIN I SERPL-MCNC: <0.02 NG/ML
WBC # BLD AUTO: 10.44 THOUSAND/UL (ref 4.31–10.16)

## 2021-06-23 PROCEDURE — 99291 CRITICAL CARE FIRST HOUR: CPT | Performed by: EMERGENCY MEDICINE

## 2021-06-23 PROCEDURE — 96375 TX/PRO/DX INJ NEW DRUG ADDON: CPT

## 2021-06-23 PROCEDURE — 99223 1ST HOSP IP/OBS HIGH 75: CPT | Performed by: INTERNAL MEDICINE

## 2021-06-23 PROCEDURE — 80053 COMPREHEN METABOLIC PANEL: CPT | Performed by: EMERGENCY MEDICINE

## 2021-06-23 PROCEDURE — 99285 EMERGENCY DEPT VISIT HI MDM: CPT

## 2021-06-23 PROCEDURE — 85610 PROTHROMBIN TIME: CPT | Performed by: EMERGENCY MEDICINE

## 2021-06-23 PROCEDURE — 84484 ASSAY OF TROPONIN QUANT: CPT | Performed by: EMERGENCY MEDICINE

## 2021-06-23 PROCEDURE — 93005 ELECTROCARDIOGRAM TRACING: CPT

## 2021-06-23 PROCEDURE — 99212 OFFICE O/P EST SF 10 MIN: CPT | Performed by: SURGERY

## 2021-06-23 PROCEDURE — U0005 INFEC AGEN DETEC AMPLI PROBE: HCPCS | Performed by: EMERGENCY MEDICINE

## 2021-06-23 PROCEDURE — 99283 EMERGENCY DEPT VISIT LOW MDM: CPT | Performed by: INTERNAL MEDICINE

## 2021-06-23 PROCEDURE — 85025 COMPLETE CBC W/AUTO DIFF WBC: CPT | Performed by: EMERGENCY MEDICINE

## 2021-06-23 PROCEDURE — 85379 FIBRIN DEGRADATION QUANT: CPT | Performed by: EMERGENCY MEDICINE

## 2021-06-23 PROCEDURE — 71046 X-RAY EXAM CHEST 2 VIEWS: CPT

## 2021-06-23 PROCEDURE — 96374 THER/PROPH/DIAG INJ IV PUSH: CPT

## 2021-06-23 PROCEDURE — 36415 COLL VENOUS BLD VENIPUNCTURE: CPT | Performed by: EMERGENCY MEDICINE

## 2021-06-23 PROCEDURE — 71275 CT ANGIOGRAPHY CHEST: CPT

## 2021-06-23 PROCEDURE — G1004 CDSM NDSC: HCPCS

## 2021-06-23 PROCEDURE — U0003 INFECTIOUS AGENT DETECTION BY NUCLEIC ACID (DNA OR RNA); SEVERE ACUTE RESPIRATORY SYNDROME CORONAVIRUS 2 (SARS-COV-2) (CORONAVIRUS DISEASE [COVID-19]), AMPLIFIED PROBE TECHNIQUE, MAKING USE OF HIGH THROUGHPUT TECHNOLOGIES AS DESCRIBED BY CMS-2020-01-R: HCPCS | Performed by: EMERGENCY MEDICINE

## 2021-06-23 RX ORDER — DOXERCALCIFEROL 2 UG/ML
2 INJECTION, SOLUTION INTRAVENOUS
Status: DISCONTINUED | OUTPATIENT
Start: 2021-06-23 | End: 2021-06-26 | Stop reason: HOSPADM

## 2021-06-23 RX ORDER — CINACALCET 30 MG/1
30 TABLET, FILM COATED ORAL 3 TIMES WEEKLY
Status: DISCONTINUED | OUTPATIENT
Start: 2021-06-24 | End: 2021-06-24 | Stop reason: SDUPTHER

## 2021-06-23 RX ORDER — DIPHENHYDRAMINE HYDROCHLORIDE 50 MG/ML
50 INJECTION INTRAMUSCULAR; INTRAVENOUS ONCE
Status: COMPLETED | OUTPATIENT
Start: 2021-06-23 | End: 2021-06-23

## 2021-06-23 RX ADMIN — CEFEPIME HYDROCHLORIDE 2000 MG: 2 INJECTION, POWDER, FOR SOLUTION INTRAVENOUS at 22:51

## 2021-06-23 RX ADMIN — HYDROCORTISONE SODIUM SUCCINATE 200 MG: 100 INJECTION, POWDER, FOR SOLUTION INTRAMUSCULAR; INTRAVENOUS at 15:49

## 2021-06-23 RX ADMIN — DIPHENHYDRAMINE HYDROCHLORIDE 50 MG: 50 INJECTION, SOLUTION INTRAMUSCULAR; INTRAVENOUS at 19:02

## 2021-06-23 RX ADMIN — IOHEXOL 85 ML: 350 INJECTION, SOLUTION INTRAVENOUS at 20:18

## 2021-06-23 NOTE — PROGRESS NOTES
Assessment/Plan:    Arteriovenous fistula for hemodialysis in place, [facility])  Patient with patent with appears to be a radiocephalic AV fistula placed approximately 14 years ago  Patient tells me the fistula is being used without complication  The discomfort she experiences is described as mild to moderate  There is no significant aneurysmal degeneration  The overlying skin is intact  In the absence of motor sensory deficit or significant aneurysmal degeneration or exquisite tenderness I would not recommend revision  There is no indication for fistulogram by my findings or reported problems with the fistula  Patient will return on a p r n  basis  Diagnoses and all orders for this visit:    Arteriovenous fistula for hemodialysis in place, [facility])    ESRD on hemodialysis [facility])  -     Ambulatory referral to Vascular Surgery    Pain of left upper extremity  -     Ambulatory referral to Vascular Surgery    A-V fistula [facility])  -     Ambulatory referral to Vascular Surgery          Subjective:      Patient ID: Ary Miller is a 49 y o  female  [facility] [place]  Patient last underwent fistulagram, performed by IR on 12/23/20  Patient c/o pain on the L wrist   Patient states she had a fistula done about 14 years ago  Patient has normal motor sensory function of the left hand  She tells me the pain is not excruciating but is uncomfortable  On examination there is no erythema  The there is no significant aneurysmal degeneration of what appears to be a radiocephalic fistula  Patient is currently on dialysis T-Th-S @ [facility]  Patient is taking Lipitor and warfarin  The following portions of the patient's history were reviewed and updated as appropriate: allergies, current medications, past family history, past medical history, past social history, past surgical history and problem list     Review of Systems   Constitutional: Negative      HENT: Negative  Eyes: Negative  Respiratory: Negative  Cardiovascular: Negative  Gastrointestinal: Negative  Endocrine: Negative  Genitourinary: Negative  Musculoskeletal: Negative  Skin: Negative  Allergic/Immunologic: Negative  Neurological: Negative  Hematological: Negative  Psychiatric/Behavioral: Negative  Objective:      /76 (BP Location: Right arm, Patient Position: Sitting, Cuff Size: Standard)   Pulse 77   Temp 98 6 °F (37 °C) (Tympanic)   Ht 5' 6" (1 676 m)   Wt 125 kg (275 lb)   LMP 02/12/2016 (Exact Date)   BMI 44 39 kg/m²          Physical Exam  Vitals and nursing note reviewed  Constitutional:       Appearance: She is well-developed  HENT:      Head: Normocephalic and atraumatic  Eyes:      Conjunctiva/sclera: Conjunctivae normal       Pupils: Pupils are equal, round, and reactive to light  Neck:      Vascular: No JVD  Cardiovascular:      Comments: Incision from antecubital fossa to the radial aspect of the wrist   No significant aneurysmal degeneration  No erythema  No tenderness to palpation  Good thrill present  Pulmonary:      Effort: No respiratory distress  Breath sounds: No stridor  No wheezing or rales  Chest:      Chest wall: No tenderness  Abdominal:      General: There is no distension  Palpations: There is no mass  Tenderness: There is no abdominal tenderness  There is no guarding or rebound  Musculoskeletal:         General: No tenderness or deformity  Normal range of motion  Cervical back: Normal range of motion and neck supple  Skin:     General: Skin is warm and dry  Findings: No erythema or rash  Neurological:      Mental Status: She is alert and oriented to person, place, and time  Comments: No motor sensory deficit of left hand   Psychiatric:         Behavior: Behavior normal          Thought Content:  Thought content normal

## 2021-06-23 NOTE — ASSESSMENT & PLAN NOTE
Patient with patent with appears to be a radiocephalic AV fistula placed approximately 14 years ago  Patient tells me the fistula is being used without complication  The discomfort she experiences is described as mild to moderate  There is no significant aneurysmal degeneration  The overlying skin is intact  In the absence of motor sensory deficit or significant aneurysmal degeneration or exquisite tenderness I would not recommend revision  There is no indication for fistulogram by my findings or reported problems with the fistula  Patient will return on a p r n  basis

## 2021-06-23 NOTE — ED NOTES
Lab contacted regarding ddimer which just says "collected" and not "in process " Lab says they did not notice order, will run right away        Iker Royal RN  06/23/21 5095

## 2021-06-23 NOTE — CONSULTS
Consultation - Nephrology   Barb Tse 49 y o  female MRN: [id number]  Unit/Bed#: ED 26 Encounter: [id number]      ASSESSMENT/PLAN:  End-stage renal disease:   Assessment:   On maintenance hemodialysis every TTS at [facility]   EDW: 124 5   Last dialysis treatment on yesterday -UF four 3 7 4 L   Electrolytes and acid-base balance within normal limits  Plan:   Next dialysis treatment tomorrow as scheduled   No urgent need for dialysis at this time   Avoid nephrotoxins, adjust meds to appropriate GFR   Will continue to follow I/O, labs and volume status    Access:   Assessment and Plan:  · Left AV fistula with positive bruit and thrill  · Patient seen vascular today-which does not feel she needs a revision or having any aneurysm    Hypertension:  Assessment and Plan:   Avoid variations in blood pressure   Home medications include:  Coreg 25 mg 2 times daily, torsemide 100 mg 2 times daily,   BP on the lower side-recommending holding torsemide at this time   Will UF as blood pressure allows    Anemia likely Chronic Kidney Disease:  Assessment and Plan:   Not on Epogen in the setting of history of PE   Current hemoglobin 8 9   Transfuse for hemoglobin less than 7 0    Bone mineral disease of Chronic Kidney Disease:  Assessment and Plan:   Hyperphosphatemia:  On Renvela 2400 mg t i d  With meals and one b i d  With snacks   Secondary hyperparathyroidism:  On Hectorol 2 mcg with HD treatment and Sensipar 30 mg 3 times a week with HD   Recommend renal diet   Check PTH and phosphorus as outpatient    Electrolytes/Acid Base:  Assessment/Plan:  Pseudo hyponatremia which corrects with a glucose of 238   Sodium, phosphorus, potassium, and acidosis to be corrected with dialysis   Will continue to monitor     Suspect pneumonia:  Seen on chest x-ray  · Patient continues to be evaluated in the ER at this time-plan CTA for PE study with history of PE  · COVID-19-to be collected      HISTORY OF PRESENT ILLNESS:  Requesting Physician: Karin Ren DO  Reason for Consult: ESRD on HD    Noa Jackson is a 49y o  year old female with a past medical history of end-stage renal disease on HD every TTS at [address] [place] [address], history of PE, panic attacks, diabetes II, legally blind, hypertension, recent admission in  for volume overload and stroke-like symptoms with frontal meningioma who presented to the ER from Radiology today after chest x-ray was obtained for cough for two weeks revealed pneumonia  A renal consultation is requested today for assistance in the management of ESRD  On discussion the patient reports having cough for two weeks of productive for green sputum  Denies fevers, chills, headaches, shortness of breath, dizziness, lightheadedness, nausea, vomiting  She reports being more compliant with dialysis and she completed her treatment yesterday to target weight       PAST MEDICAL HISTORY:  Past Medical History:   Diagnosis Date    Abnormal uterine bleeding (AUB)     Anxiety     Arthritis     Chronic kidney disease     Claustrophobia     Diabetes mellitus ([address] )     Disease of thyroid gland     DVT (deep venous thrombosis) (HCC)     End stage kidney disease (HCC)     Foot ulcer due to secondary DM ([address] )     Hemodialysis [facility] ([facility]     Tues, Thurs, Sat    Hypertension     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[address] )     right eye    Morbid obesity ([address] )     Osteomyelitis of fifth toe of right foot ([address] )     Panic attacks     Pulmonary embolism (HCC)     Reflux esophagitis     Thrombophlebitis of saphenous vein     Warfarin anticoagulation        PAST SURGICAL HISTORY:  Past Surgical History:   Procedure Laterality Date    AMPUTATION      r 4th toe    ARTERIOVENOUS GRAFT PLACEMENT      CATARACT EXTRACTION Bilateral     CERVICAL BIOPSY  W/ LOOP ELECTRODE EXCISION       SECTION      DIALYSIS FISTULA CREATION      DILATION AND CURETTAGE OF UTERUS      ENDOMETRIAL ABLATION W/ NOVASURE      EYE SURGERY      HYSTERECTOMY      @ age 57 (complete)    IR AV FISTULAGRAM/GRAFTOGRAM  10/11/2019    IR AV FISTULAGRAM/GRAFTOGRAM  8/12/2020    IR AV FISTULAGRAM/GRAFTOGRAM  12/23/2020    OOPHORECTOMY Bilateral     @ age 57    PERICARDIUM SURGERY      [place] AMPUTATION TOE,MT-P JT Right 5/28/2020    Procedure: AMPUTATION TOE- 5th;  Surgeon: Chavo Foster DPM;  Location: [facility];  Service: Podiatry    [place] COLONOSCOPY FLX DX W/COLLJ [address] PFRMD N/A 3/13/2019    Procedure: COLONOSCOPY;  Surgeon: Veda Sanchez MD;  Location: [facility]; Service: Gastroenterology    [place] ESOPHAGOGASTRODUODENOSCOPY TRANSORAL DIAGNOSTIC N/A 3/13/2019    Procedure: ESOPHAGOGASTRODUODENOSCOPY (EGD); Surgeon: Veda Sanchez MD;  Location: [facility];   Service: Gastroenterology    [place] LAPAROSCOPY W TOT HYSTERECT UTERUS 250 GRAM OR LESS N/A 2/16/2016    Procedure: HYSTERECTOMY LAPAROSCOPIC TOTAL ([address]), with bilateral salpingectomy;  Surgeon: Manuel Casiano DO;  [facility];  Service: Gynecology    THROMBECTOMY / ARTERIOVENOUS GRAFT REVISION      TOE SURGERY Right     removal of the 4th toe       ALLERGIES:  Allergies   Allergen Reactions    Iodinated Diagnostic Agents Itching       SOCIAL HISTORY:  Social History     Substance and Sexual Activity   Alcohol Use Never    Alcohol/week: 0 0 standard drinks     Social History     Substance and Sexual Activity   Drug Use No     Social History     Tobacco Use   Smoking Status Never Smoker   Smokeless Tobacco Never Used       FAMILY HISTORY:  Family History   Problem Relation Age of Onset    Heart disease Family     Diabetes Family     Heart disease Mother     Cancer Brother         neck    Throat cancer Brother     Ovarian cancer Maternal Aunt 40       MEDICATIONS:    Current Facility-Administered Medications:     diphenhydrAMINE (BENADRYL) injection 50 mg, 50 mg, Intravenous, Once, Betty Henderson MD    hydrocortisone sodium succinate (PF) (Solu-CORTEF) injection 200 mg, 200 mg, Intravenous, Once, Jossue Stephens MD    Current Outpatient Medications:     Accu-Chek Esther Plus test strip, 3 (three) times a day Use to test blood sugar, Disp: , Rfl:     Accu-Chek Softclix Lancets lancets, 3 (three) times a day Use to test blood sugar, Disp: , Rfl:     acetaminophen (TYLENOL) 325 mg tablet, Take 2 tablets (650 mg total) by mouth every 6 (six) hours as needed for mild pain or fever, Disp: 30 tablet, Rfl: 0    albuterol (ProAir HFA) 90 mcg/act inhaler, Inhale 2 puffs every 6 (six) hours as needed for wheezing or shortness of breath, Disp: 8 5 g, Rfl: 1    ALPRAZolam (XANAX) 0 25 mg tablet, Take 0 25 mg by mouth 2 (two) times a day as needed for anxiety, Disp: , Rfl:     atorvastatin (LIPITOR) 20 mg tablet, Take 20 mg by mouth every evening , Disp: , Rfl: 0    atropine (ISOPTO ATROPINE) 1 % ophthalmic solution, Administer 1 drop to both eyes daily at bedtime , Disp: , Rfl:     benzonatate (TESSALON PERLES) 100 mg capsule, Take 1 capsule (100 mg total) by mouth 3 (three) times a day as needed for cough, Disp: 20 capsule, Rfl: 0    CALCIUM CARBONATE PO, Take 1,000 mg by mouth daily  , Disp: , Rfl:     carvedilol (COREG) 25 mg tablet, Take 25 mg by mouth 2 (two) times a day with meals  , Disp: , Rfl:     cinacalcet (SENSIPAR) 30 mg tablet, Take 30 mg by mouth daily, Disp: , Rfl:     diclofenac sodium (VOLTAREN) 1 %, Apply 2 g topically 4 (four) times a day, Disp: 200 g, Rfl: 0    diphenhydrAMINE (BENADRYL) 25 mg capsule, Take by mouth as needed for itching  , Disp: , Rfl:     gabapentin (NEURONTIN) 100 mg capsule, Take 3 capsules (300 mg total) by mouth daily at bedtime, Disp: , Rfl: 0    insulin glargine (LANTUS) 100 units/mL subcutaneous injection, Inject 20 Units under the skin daily at bedtime, Disp: 10 mL, Rfl: 0    KIONEX 15 GM/60ML suspension, Take by mouth Takes as a shake on dialysis days Tues-Thurs_Sat, Disp: , Rfl: 0   levothyroxine 50 mcg tablet, Take 50 mcg by mouth daily, Disp: , Rfl:     lidocaine (LIDODERM) 5 %, APPLY 1 PATCH BY TRANDERMAL ROUTE EVERY DAY (MAY WEAR UP TO 12 HOURS)  , Disp: , Rfl:     loratadine (CLARITIN) 10 mg tablet, Take 10 mg by mouth daily, Disp: , Rfl:     NOVOLOG FLEXPEN 100 units/mL SOPN, INJECT 1 TO 5 UNITS SUBCUTANEOUSLY THREE TIMES A DAY BEFORE MELAS AS PER SLIDING SCALE, Disp: , Rfl:     nystatin (MYCOSTATIN) powder, Apply topically 2 (two) times a day, Disp: 15 g, Rfl: 0    ondansetron (ZOFRAN) 4 mg tablet, Take 1 tablet (4 mg total) by mouth every 8 (eight) hours as needed for nausea or vomiting, Disp: 12 tablet, Rfl: 0    pantoprazole (PROTONIX) 40 mg tablet, Take 1 tablet (40 mg total) by mouth daily in the early morning, Disp: , Rfl: 0    senna (SENOKOT) 8 6 mg, Take 2 tablets (17 2 mg total) by mouth daily at bedtime as needed for constipation, Disp: 30 each, Rfl: 0    sertraline (ZOLOFT) 50 mg tablet, Take 1 tablet (50 mg total) by mouth daily, Disp: 30 tablet, Rfl: 0    sevelamer (RENAGEL) 800 mg tablet, Take 1 tablet (800 mg total) by mouth 3 (three) times a day with meals, Disp: 30 tablet, Rfl: 0    sodium chloride (OCEAN) 0 65 % nasal spray, 1 spray into each nostril 3 (three) times a day as needed for congestion, Disp: 30 mL, Rfl: 0    torsemide (DEMADEX) 100 mg tablet, Take 100 mg by mouth every 12 (twelve) hours , Disp: , Rfl: 0    warfarin (COUMADIN) 3 mg tablet, Take 3 tablets (9 mg total) by mouth daily Takes 9 mg Monday Sat, Disp: 10 tablet, Rfl: 0    brimonidine (ALPHAGAN P) 0 15 % ophthalmic solution, Administer 1 drop to both eyes 2 (two) times a day , Disp: , Rfl:     Podiatric Products (Flexitol Heel Balm) OINT, Apply topically once for 1 dose, Disp: 112 g, Rfl: 2    prednisoLONE acetate (PRED FORTE) 1 % ophthalmic suspension, Administer 1 drop to both eyes 4 (four) times a day Patient takes only occasionally, Disp: , Rfl:     REVIEW OF SYSTEMS:  A complete 10 point review of systems was performed and found to be negative unless otherwise noted below or in the HPI  General: No fevers, chills, weakness  Cardiovascular:  Denies chest pain, palpitations, or leg edema  Respiratory:  Complaining of cough for two weeks with mucus production of yellow color  Gastrointestinal:  Denies nausea, vomiting, abdominal pain, diarrhea or constipation  PHYSICAL EXAM:  Current Weight:    First Weight:    Vitals:    06/23/21 1219 06/23/21 1424 06/23/21 1530   BP: 96/55  (!) 101/49   BP Location:   Right arm   Pulse: 85  70   Resp: 20  18   Temp:  97 9 °F (36 6 °C)    TempSrc:  Oral    SpO2: 95%  98%     No intake or output data in the 24 hours ending 06/23/21 1546  General:  No acute distress, cooperative, conscious   Skin:  Warm and dry without rash  ENMT:  Mucous membranes moist, sclera anicteric,   Neck:  Supple without JVD  Respiratory:  Clear on auscultation without crackles, rhonchi, wheezes, decreased  CVS:  Regular rate and rhythm without rub  GI:  Abdomen soft, nontender, no distention, active bowel sounds  Extremities:  No significant edema noted bilaterally    Left AV fistula positive bruit and thrill  Neuro:  Alert oriented awake  Psych:  Appropriate affect     Lab Results:   Results from last 7 days   Lab Units 06/23/21  1310 06/23/21  1253   WBC Thousand/uL 10 44*  --    HEMOGLOBIN g/dL 8 9*  --    HEMATOCRIT % 27 4*  --    PLATELETS Thousands/uL 214  --    SODIUM mmol/L  --  134*   POTASSIUM mmol/L  --  4 8   CHLORIDE mmol/L  --  97*   CO2 mmol/L  --  27   BUN mg/dL  --  47*   CREATININE mg/dL  --  7 47*   CALCIUM mg/dL  --  8 8   ALK PHOS U/L  --  184*   ALT U/L  --  23   AST U/L  --  16     Lab Results   Component Value Date    CALCIUM 8 8 06/23/2021    PHOS 5 3 (H) 04/27/2019

## 2021-06-23 NOTE — LETTER
June 23, 2021     Michael Zamora MD  [address] [place] [address]    Patient: Mellissa Stein   YOB: 1965   Date of Visit: 6/23/2021       Dear Dr Morris Bautista: Thank you for referring Mellissa Stein to me for evaluation  Below are the relevant portions of my assessment and plan of care  Patient is new and referred by Jaja Amato, Scott Andrade  Patient last underwent fistulagram, performed by IR on 12/23/20  Patient c/o pain on the L wrist   Patient states she had a fistula done about 14 years ago  Patient has normal motor sensory function of the left hand  She tells me the pain is not excruciating but is uncomfortable  On examination there is no erythema  The there is no significant aneurysmal degeneration of what appears to be a radiocephalic fistula  Patient is currently on dialysis T-Th-S @ [facility]  Patient is taking Lipitor and warfarin  Assessment/Plan:    Arteriovenous fistula for hemodialysis in place, primary [facility])  Patient with patent with appears to be a radiocephalic AV fistula placed approximately 14 years ago  Patient tells me the fistula is being used without complication  The discomfort she experiences is described as mild to moderate  There is no significant aneurysmal degeneration  The overlying skin is intact  In the absence of motor sensory deficit or significant aneurysmal degeneration or exquisite tenderness I would not recommend revision  There is no indication for fistulogram by my findings or reported problems with the fistula  Patient will return on a p r n  basis  If you have questions, please do not hesitate to call me  I look forward to following Mario García along with you           Sincerely,        Philip Coombs MD        CC: LOIS Charles,

## 2021-06-23 NOTE — ED PROVIDER NOTES
History  Chief Complaint   Patient presents with    Medical Problem     Recent discharge from hospital has been having cough, L rib pain since  Had x ray today  Told to come here immediately      73-year-old female with a past medical history of end-stage renal disease, prior DVT and PE, recent admission for "anxiety" which she received a cardiac evaluation as well as stroke workup, chronic vasculopath, who presents for cough  She reports that she has had a cough since her discharge on 06/11, reports feeling subjective fevers last night, and today had a stat outpatient x-ray of her chest that suggested a left-sided pneumonia  Then, she was referred here  Here she reports left-sided chest discomfort that is pleuritic in nature  She reports shortness of breath  She is on warfarin therapy  She reports a cough with productive sputum that is green  She does report congestion  ROS otherwise negative  Prior to Admission Medications   Prescriptions Last Dose Informant Patient Reported? Taking? ALPRAZolam (XANAX) 0 25 mg tablet 6/22/2021 at Unknown time Self Yes Yes   Sig: Take 0 25 mg by mouth 2 (two) times a day as needed for anxiety   Accu-Chek Softclix Lancets lancets 6/22/2021 at Unknown time Self Yes Yes   Sig: 3 (three) times a day Use to test blood sugar   CALCIUM CARBONATE PO 6/22/2021 at Unknown time Self Yes Yes   Sig: Take 1,000 mg by mouth daily     KIONEX 15 GM/60ML suspension 6/22/2021 at Unknown time Self Yes Yes   Sig: Take by mouth Takes as a shake on dialysis days Tues-Thurs_Sat   NOVOLOG FLEXPEN 100 units/mL SOPN 6/22/2021 at Unknown time Self Yes Yes   Sig: INJECT 1 TO 5 UNITS SUBCUTANEOUSLY THREE TIMES A DAY BEFORE MELAS AS PER SLIDING SCALE   [employer] (Flexitol Heel Balm) OINT   No No   Sig: Apply topically once for 1 dose   acetaminophen (TYLENOL) 325 mg tablet 6/22/2021 at Unknown time Self No Yes   Sig: Take 2 tablets (650 mg total) by mouth every 6 (six) hours as needed for mild pain or fever   albuterol (ProAir HFA) 90 mcg/act inhaler 6/22/2021 at Unknown time Self No Yes   Sig: Inhale 2 puffs every 6 (six) hours as needed for wheezing or shortness of breath   atorvastatin (LIPITOR) 20 mg tablet 6/22/2021 at Unknown time Self Yes Yes   Sig: Take 20 mg by mouth every evening    atropine (ISOPTO ATROPINE) 1 % ophthalmic solution 6/22/2021 at Unknown time Self Yes Yes   Sig: Administer 1 drop to both eyes daily at bedtime    benzonatate (TESSALON PERLES) 100 mg capsule 6/22/2021 at Unknown time Self No Yes   Sig: Take 1 capsule (100 mg total) by mouth 3 (three) times a day as needed for cough   brimonidine (ALPHAGAN P) 0 15 % ophthalmic solution  Self Yes No   Sig: Administer 1 drop to both eyes 2 (two) times a day    carvedilol (COREG) 25 mg tablet Past Week at Unknown time Self Yes Yes   Sig: Take 25 mg by mouth 2 (two) times a day with meals     cinacalcet (SENSIPAR) 30 mg tablet 6/22/2021 at Unknown time Self Yes Yes   Sig: Take 30 mg by mouth daily   diclofenac sodium (VOLTAREN) 1 % 6/22/2021 at Unknown time Self No Yes   Sig: Apply 2 g topically 4 (four) times a day   diphenhydrAMINE (BENADRYL) 25 mg capsule 6/22/2021 at Unknown time Self Yes Yes   Sig: Take by mouth as needed for itching     gabapentin (NEURONTIN) 100 mg capsule 6/22/2021 at Unknown time Self No Yes   Sig: Take 3 capsules (300 mg total) by mouth daily at bedtime   insulin glargine (LANTUS) 100 units/mL subcutaneous injection 6/22/2021 at Unknown time Self No Yes   Sig: Inject 20 Units under the skin daily at bedtime   levothyroxine 50 mcg tablet 6/22/2021 at Unknown time Self Yes Yes   Sig: Take 50 mcg by mouth daily   lidocaine (LIDODERM) 5 % 6/22/2021 at Unknown time Self Yes Yes   Sig: APPLY 1 PATCH BY TRANDERMAL ROUTE EVERY DAY (MAY WEAR UP TO 12 HOURS)     loratadine (CLARITIN) 10 mg tablet 6/22/2021 at Unknown time Self Yes Yes   Sig: Take 10 mg by mouth daily   nystatin (MYCOSTATIN) powder 6/22/2021 at Unknown time Self No Yes   Sig: Apply topically 2 (two) times a day   ondansetron (ZOFRAN) 4 mg tablet 2021 at Unknown time Self No Yes   Sig: Take 1 tablet (4 mg total) by mouth every 8 (eight) hours as needed for nausea or vomiting   pantoprazole (PROTONIX) 40 mg tablet 2021 at Unknown time Self No Yes   Sig: Take 1 tablet (40 mg total) by mouth daily in the early morning   prednisoLONE acetate (PRED FORTE) 1 % ophthalmic suspension  Self Yes No   Sig: Administer 1 drop to both eyes 4 (four) times a day Patient takes only occasionally   senna (SENOKOT) 8 6 mg 2021 at Unknown time Self No Yes   Sig: Take 2 tablets (17 2 mg total) by mouth daily at bedtime as needed for constipation   sertraline (ZOLOFT) 50 mg tablet 2021 at Unknown time Self No Yes   Sig: Take 1 tablet (50 mg total) by mouth daily   sevelamer (RENAGEL) 800 mg tablet 2021 at Unknown time Self No Yes   Sig: Take 1 tablet (800 mg total) by mouth 3 (three) times a day with meals   sodium chloride (OCEAN) 0 65 % nasal spray 2021 at Unknown time Self No Yes   Si spray into each nostril 3 (three) times a day as needed for congestion   torsemide (DEMADEX) 100 mg tablet 2021 at Unknown time Self Yes Yes   Sig: Take 100 mg by mouth every 12 (twelve) hours    warfarin (COUMADIN) 3 mg tablet 2021 at Unknown time Self No Yes   Sig: Take 3 tablets (9 mg total) by mouth daily Takes 9 mg Monday Sat      Facility-Administered Medications: None       Past Medical History:   Diagnosis Date    Abnormal uterine bleeding (AUB)     Anxiety     Arthritis     Chronic kidney disease     Claustrophobia     [facility] mellitus [facility]     Disease of thyroid gland     DVT (deep venous thrombosis) (HCC)     End stage kidney disease ([facility] )     Foot ulcer due to secondary DM ([facility] )     Hemodialysis patient ([facility] )     Tues, Thurs, Sat    Hypertension     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[facility] )     right eye    Morbid obesity ([address] )     Osteomyelitis of fifth toe of right foot (HCC)     Panic attacks     Pulmonary embolism (HCC)     Reflux esophagitis     Thrombophlebitis of saphenous vein     Warfarin anticoagulation        Past Surgical History:   Procedure Laterality Date    AMPUTATION      r 4th toe    ARTERIOVENOUS GRAFT PLACEMENT      CATARACT EXTRACTION Bilateral     CERVICAL BIOPSY  W/ LOOP ELECTRODE EXCISION       SECTION      DIALYSIS FISTULA CREATION      DILATION AND CURETTAGE OF UTERUS      ENDOMETRIAL ABLATION W/ NOVASURE      EYE SURGERY      HYSTERECTOMY      @ age 57 (complete)    IR AV FISTULAGRAM/GRAFTOGRAM  10/11/2019    IR AV FISTULAGRAM/GRAFTOGRAM  2020    IR AV FISTULAGRAM/GRAFTOGRAM  2020    OOPHORECTOMY Bilateral     @ age 57    PERICARDIUM SURGERY      [place] AMPUTATION TOE,MT-P JT Right 2020    Procedure: AMPUTATION TOE- 5th;  Surgeon: Caitlin Perez DPM;  Location: [facility];  Service: Podiatry    [place] COLONOSCOPY FLX DX W/LANDON 1978 PFRMD N/A 3/13/2019    Procedure: COLONOSCOPY;  Surgeon: Pipo Castorena MD;  Location: [facility]; Service: Gastroenterology    [place] ESOPHAGOGASTRODUODENOSCOPY TRANSORAL DIAGNOSTIC N/A 3/13/2019    Procedure: ESOPHAGOGASTRODUODENOSCOPY (EGD); Surgeon: Pipo Castorena MD;  [facility];   Service: Gastroenterology    [place] LAPAROSCOPY W TOT HYSTERECT UTERUS 250 GRAM OR LESS N/A 2016    Procedure: HYSTERECTOMY LAPAROSCOPIC TOTAL [facility]), with bilateral salpingectomy;  Surgeon: Filemon Leonard DO;  [facility];  Service: Gynecology    THROMBECTOMY / ARTERIOVENOUS GRAFT REVISION      TOE SURGERY Right     removal of the 4th toe       Family History   Problem Relation Age of Onset    Heart disease Family     Diabetes Family     Heart disease Mother     Cancer Brother         neck    Throat cancer Brother     Ovarian cancer Maternal Aunt 36     I have reviewed and agree with the history as documented  E-Cigarette/Vaping    E-Cigarette Use Never User      E-Cigarette/Vaping Substances    Nicotine No     THC No     CBD No     Flavoring No     Other No     Unknown No      Social History     Tobacco Use    Smoking status: Never Smoker    Smokeless tobacco: Never Used   Vaping Use    Vaping Use: Never used   Substance Use Topics    Alcohol use: Never     Alcohol/week: 0 0 standard drinks    Drug use: No        Review of Systems   Constitutional: Negative for chills and fever  HENT: Negative for congestion, rhinorrhea and sore throat  Respiratory: Positive for cough and shortness of breath  Cardiovascular: Positive for chest pain  Negative for palpitations  Gastrointestinal: Negative for abdominal pain, constipation, diarrhea, nausea and vomiting  Genitourinary: Negative for difficulty urinating and flank pain  Musculoskeletal: Negative for arthralgias  Neurological: Negative for dizziness, weakness, light-headedness and headaches  Psychiatric/Behavioral: Negative for agitation, behavioral problems and confusion  All other systems reviewed and are negative  Physical Exam  ED Triage Vitals   Temperature Pulse Respirations Blood Pressure SpO2   06/23/21 1424 06/23/21 1219 06/23/21 1219 06/23/21 1219 06/23/21 1219   97 9 °F (36 6 °C) 85 20 96/55 95 %      Temp Source Heart Rate Source Patient Position - Orthostatic VS BP Location FiO2 (%)   06/23/21 1424 06/23/21 1936 06/23/21 1530 06/23/21 1530 --   Oral Monitor Lying Right arm       Pain Score       06/24/21 0820       No Pain             Orthostatic Vital Signs  Vitals:    06/24/21 1225 06/24/21 1230 06/24/21 1336 06/24/21 1645   BP: 115/84 122/86 (!) 179/68 (!) 171/68   Pulse: 76 77 77    Patient Position - Orthostatic VS: Lying Lying         Physical Exam  Constitutional:       Appearance: She is well-developed  HENT:      Head: Normocephalic and atraumatic        Right Ear: Tympanic membrane normal       Left Ear: Tympanic membrane normal       Nose: Nose normal       Mouth/Throat:      Mouth: Mucous membranes are moist    Eyes:      Pupils: Pupils are equal, round, and reactive to light  Cardiovascular:      Rate and Rhythm: Normal rate and regular rhythm  Heart sounds: Normal heart sounds  No murmur heard  No friction rub  Pulmonary:      Effort: Pulmonary effort is normal  No respiratory distress  Breath sounds: Normal breath sounds  No wheezing or rales  Abdominal:      General: Bowel sounds are normal  There is no distension  Palpations: Abdomen is soft  Tenderness: There is no abdominal tenderness  Musculoskeletal:         General: Normal range of motion  Cervical back: Normal range of motion and neck supple  Skin:     General: Skin is warm  Neurological:      Mental Status: She is alert and oriented to person, place, and time  Coordination: Coordination normal    Psychiatric:         Behavior: Behavior normal          Thought Content:  Thought content normal          Judgment: Judgment normal          ED Medications  Medications   doxercalciferol (HECTOROL) injection 2 mcg (2 mcg Intravenous Given 6/24/21 0918)   albuterol (PROVENTIL HFA,VENTOLIN HFA) inhaler 2 puff (has no administration in time range)   ALPRAZolam (XANAX) tablet 0 25 mg (0 25 mg Oral Given 6/24/21 0804)   atorvastatin (LIPITOR) tablet 20 mg (20 mg Oral Given 6/24/21 1709)   atropine (ISOPTO ATROPINE) 1 % ophthalmic solution 1 drop (has no administration in time range)   benzonatate (TESSALON PERLES) capsule 100 mg (has no administration in time range)   brimonidine (ALPHAGAN P) 0 15 % ophthalmic solution 1 drop (1 drop Both Eyes Not Given 6/24/21 1706)   calcium carbonate (TUMS) chewable tablet 1,000 mg (1,000 mg Oral Given 6/24/21 1337)   carvedilol (COREG) tablet 25 mg (25 mg Oral Given 6/24/21 1709)   cinacalcet (SENSIPAR) tablet 30 mg (30 mg Oral Given 6/24/21 1337)   Diclofenac Sodium (VOLTAREN) 1 % topical gel 2 g (2 g Topical Not Given 6/24/21 1710)   gabapentin (NEURONTIN) capsule 300 mg (has no administration in time range)   insulin glargine (LANTUS) subcutaneous injection 20 Units 0 2 mL (0 Units Subcutaneous Hold 6/24/21 0542)   levothyroxine tablet 50 mcg (50 mcg Oral Given 6/24/21 0539)   lidocaine (LIDODERM) 5 % patch 1 patch (1 patch Topical Medication Applied 6/24/21 1335)   loratadine (CLARITIN) tablet 10 mg (10 mg Oral Given 6/24/21 1336)   pantoprazole (PROTONIX) EC tablet 40 mg (40 mg Oral Given 6/24/21 0539)   sertraline (ZOLOFT) tablet 50 mg (50 mg Oral Given 6/24/21 1337)   sevelamer (RENAGEL) tablet 800 mg (800 mg Oral Given 6/24/21 1708)   sodium chloride (OCEAN) 0 65 % nasal spray 1 spray (has no administration in time range)   torsemide (DEMADEX) tablet 100 mg (100 mg Oral Given 6/24/21 1336)   warfarin (COUMADIN) tablet 9 mg (9 mg Oral Given 6/24/21 1709)   acetaminophen (TYLENOL) tablet 650 mg (650 mg Oral Given 6/24/21 1150)   docusate sodium (COLACE) capsule 100 mg (has no administration in time range)   ondansetron (ZOFRAN) injection 4 mg (4 mg Intravenous Given 6/24/21 1750)   guaiFENesin (MUCINEX) 12 hr tablet 600 mg (600 mg Oral Given 6/24/21 1709)   insulin lispro (HumaLOG) 100 units/mL subcutaneous injection 2-12 Units (6 Units Subcutaneous Given 6/24/21 1751)   insulin lispro (HumaLOG) 100 units/mL subcutaneous injection 1-5 Units (0 Units Subcutaneous Hold 6/24/21 0541)   cefepime (MAXIPIME) 1,000 mg in dextrose 5 % 50 mL IVPB (has no administration in time range)   hydrocortisone sodium succinate (PF) (Solu-CORTEF) injection 200 mg (200 mg Intravenous Given 6/23/21 1549)   diphenhydrAMINE (BENADRYL) injection 50 mg (50 mg Intravenous Given 6/23/21 1902)   iohexol (OMNIPAQUE) 350 MG/ML injection (SINGLE-DOSE) 85 mL (85 mL Intravenous Given 6/23/21 2018)   cefepime (MAXIPIME) 2 g/50 mL dextrose IVPB (0 mg Intravenous Stopped 6/24/21 0000)   doxycycline (VIBRAMYCIN) 100 mg in sodium chloride 0 9 % 100 mL IVPB (0 mg Intravenous Stopped 6/24/21 0455)   insulin glargine (LANTUS) subcutaneous injection 10 Units 0 1 mL (10 Units Subcutaneous Given 6/24/21 0633)   insulin regular (HumuLIN R,NovoLIN R) injection 10 Units (10 Units Subcutaneous Given 6/24/21 0632)       Diagnostic Studies  Results Reviewed     Procedure Component Value Units Date/Time    Procalcitonin with AM Reflex [[id number]]  (Abnormal) Collected: 06/24/21 0456    Lab Status: Final result Specimen: Blood from Arm, Right Updated: 06/24/21 0625     Procalcitonin 0 27 ng/ml     Procalcitonin Reflex [[id number]]     Lab Status: No result Specimen: Blood     TSH, 3rd generation [[id number]]  (Normal) Collected: 06/24/21 0456    Lab Status: Final result Specimen: Blood from Arm, Right Updated: 06/24/21 0555     TSH 3RD GENERATON 0 736 uIU/mL     Narrative:      Patients undergoing fluorescein dye angiography may retain small amounts of fluorescein in the body for 48-72 hours post procedure  Samples containing fluorescein can produce falsely depressed TSH values  If the patient had this procedure,a specimen should be resubmitted post fluorescein clearance        Comprehensive metabolic panel [[id number]]  (Abnormal) Collected: 06/24/21 0456    Lab Status: Final result Specimen: Blood from Arm, Right Updated: 06/24/21 0545     Sodium 129 mmol/L      Potassium 5 0 mmol/L      Chloride 94 mmol/L      CO2 25 mmol/L      ANION GAP 10 mmol/L      BUN 66 mg/dL      Creatinine 8 87 mg/dL      Glucose 560 mg/dL      Calcium 7 7 mg/dL      Corrected Calcium 8 8 mg/dL      AST 10 U/L      ALT 19 U/L      Alkaline Phosphatase 153 U/L      Total Protein 6 8 g/dL      Albumin 2 6 g/dL      Total Bilirubin 0 38 mg/dL      eGFR 5 ml/min/1 73sq m     Narrative:      [place] guidelines for Chronic Kidney Disease (CKD):     Stage 1 with normal or high GFR (GFR > 90 mL/min/1 73 square meters)    Stage 2 Mild CKD (GFR = 60-89 mL/min/1 73 square meters)    Stage 3A Moderate CKD (GFR = 45-59 mL/min/1 73 square meters)    Stage 3B Moderate CKD (GFR = 30-44 mL/min/1 73 square meters)    Stage 4 Severe CKD (GFR = 15-29 mL/min/1 73 square meters)    Stage 5 End Stage CKD (GFR <15 mL/min/1 73 square meters)  Note: GFR calculation is accurate only with a steady state creatinine    Magnesium [[id number]]  (Normal) Collected: 06/24/21 0456    Lab Status: Final result Specimen: Blood from Arm, Right Updated: 06/24/21 0544     Magnesium 1 9 mg/dL     Phosphorus [[id number]]  (Normal) Collected: 06/24/21 0456    Lab Status: Final result Specimen: Blood from Arm, Right Updated: 06/24/21 0544     Phosphorus 4 5 mg/dL     APTT [[id number]]  (Abnormal) Collected: 06/24/21 0456    Lab Status: Final result Specimen: Blood from Arm, Right Updated: 06/24/21 0525     PTT 50 seconds     Protime-INR [[id number]]  (Abnormal) Collected: 06/24/21 0456    Lab Status: Final result Specimen: Blood from Arm, Right Updated: 06/24/21 0525     Protime 21 9 seconds      INR 1 92    CBC and differential [[id number]]  (Abnormal) Collected: 06/24/21 0457    Lab Status: Final result Specimen: Blood from Arm, Right Updated: 06/24/21 0502     WBC 8 59 Thousand/uL      RBC 2 69 Million/uL      Hemoglobin 8 4 g/dL      Hematocrit 25 7 %      MCV 96 fL      MCH 31 2 pg      MCHC 32 7 g/dL      RDW 12 8 %      MPV 10 9 fL      Platelets 757 Thousands/uL      nRBC 0 /100 WBCs      Neutrophils Relative 87 %      Immat GRANS % 1 %      Lymphocytes Relative 6 %      Monocytes Relative 6 %      Eosinophils Relative 0 %      Basophils Relative 0 %      Neutrophils Absolute 7 47 Thousands/µL      Immature Grans Absolute 0 07 Thousand/uL      Lymphocytes Absolute 0 51 Thousands/µL      Monocytes Absolute 0 53 Thousand/µL      Eosinophils Absolute 0 00 Thousand/µL      Basophils Absolute 0 01 Thousands/µL     Sputum culture and Gram stain [[id number]]     Lab Status: No result Specimen: Sputum     Strep Pneumoniae, Urine [[id number]]     Lab Status: No result Specimen: Urine     Legionella antigen, urine [[id number]]     Lab Status: No result Specimen: Urine     MRSA culture [[id number]]     Lab Status: No result Specimen: Nares     Novel Coronavirus (Covid-19),PCR [facility] - 2 Hour Stat [[id number]]  (Normal) Collected: 06/23/21 1549    Lab Status: Final result Specimen: Nares from Nose Updated: 06/23/21 1804     SARS-CoV-2 Negative    Narrative:           D-dimer, quantitative [[id number]]  (Abnormal) Collected: 06/23/21 1310    Lab Status: Final result Specimen: Blood from Arm, Right Updated: 06/23/21 1448     D-Dimer, Quant 1 33 ug/ml FEU     Protime-INR [[id number]]  (Abnormal) Collected: 06/23/21 1310    Lab Status: Final result Specimen: Blood from Arm, Right Updated: 06/23/21 1423     Protime 21 2 seconds      INR 1 84    CBC and differential [[id number]]  (Abnormal) Collected: 06/23/21 1310    Lab Status: Final result Specimen: Blood from Arm, Right Updated: 06/23/21 1412     WBC 10 44 Thousand/uL      RBC 2 87 Million/uL      Hemoglobin 8 9 g/dL      Hematocrit 27 4 %      MCV 96 fL      MCH 31 0 pg      MCHC 32 5 g/dL      RDW 12 9 %      MPV 10 7 fL      Platelets 291 Thousands/uL      nRBC 0 /100 WBCs      Neutrophils Relative 82 %      Immat GRANS % 1 %      Lymphocytes Relative 10 %      Monocytes Relative 7 %      Eosinophils Relative 0 %      Basophils Relative 0 %      Neutrophils Absolute 8 45 Thousands/µL      Immature Grans Absolute 0 09 Thousand/uL      Lymphocytes Absolute 1 06 Thousands/µL      Monocytes Absolute 0 76 Thousand/µL      Eosinophils Absolute 0 04 Thousand/µL      Basophils Absolute 0 04 Thousands/µL     Comprehensive metabolic panel [[id number]]  (Abnormal) Collected: 06/23/21 1253    Lab Status: Final result Specimen: Blood from Arm, Right Updated: 06/23/21 1323     Sodium 134 mmol/L      Potassium 4 8 mmol/L      Chloride 97 mmol/L      CO2 27 mmol/L      ANION GAP 10 mmol/L      BUN 47 mg/dL      Creatinine 7 47 mg/dL      Glucose 238 mg/dL      Calcium 8 8 mg/dL      Corrected Calcium 9 6 mg/dL      AST 16 U/L      ALT 23 U/L      Alkaline Phosphatase 184 U/L      Total Protein 7 8 g/dL      Albumin 3 0 g/dL      Total Bilirubin 0 57 mg/dL      eGFR 6 ml/min/1 73sq m     Narrative:      National Kidney Disease Foundation guidelines for Chronic Kidney Disease (CKD):     Stage 1 with normal or high GFR (GFR > 90 mL/min/1 73 square meters)    Stage 2 Mild CKD (GFR = 60-89 mL/min/1 73 square meters)    Stage 3A Moderate CKD (GFR = 45-59 mL/min/1 73 square meters)    Stage 3B Moderate CKD (GFR = 30-44 mL/min/1 73 square meters)    Stage 4 Severe CKD (GFR = 15-29 mL/min/1 73 square meters)    Stage 5 End Stage CKD (GFR <15 mL/min/1 73 square meters)  Note: GFR calculation is accurate only with a steady state creatinine    Troponin I [[id number]]  (Normal) Collected: 06/23/21 1253    Lab Status: Final result Specimen: Blood from Arm, Right Updated: 06/23/21 1322     Troponin I <0 02 ng/mL                  CTA ED chest PE Study   Final Result by Wm Patel MD (06/23 2102)      No evidence of pulmonary embolism given limitations  Consolidation in the left upper lobe anteriorly and posterior medially could relate to pneumonia /infection  Posttreatment follow-up with unenhanced chest CT recommended in 3 months to exclude other etiologies  The study was marked in EPIC for significant notification                    Workstation performed: [id number]               Procedures  ECG 12 Lead Documentation Only    Date/Time: 6/23/2021 4:10 PM  Performed by: Alexandro Vale MD  Authorized by: Alexandro Vale MD     Indications / Diagnosis:  Cp  ECG reviewed by me, the ED Provider: yes    Patient location:  ED  Previous ECG:     Previous ECG:  Compared to current    Similarity:  No change  Interpretation:     Interpretation: normal    Rate:     ECG rate:  76    ECG rate assessment: normal    Rhythm:     Rhythm: sinus rhythm    Ectopy:     Ectopy: none    QRS:     QRS axis:  Normal    QRS intervals:  Normal  Conduction:     Conduction: normal    ST segments:     ST segments:  Normal  T waves:     T waves: normal            ED Course  ED Course as of Jun 24 2138   Wed Jun 23, 2021   1326 SBP on re-evaluation at 168 after proper cuff applied      1336 Asked nurse to obtain temp        1339 Temp is 98       1413 WBC(!): 10 44   1505 D-Dimer, Quant(!): 1 33   1525 Radiology says pt requires 4 hour protocol  Hydrocortisone, benadryl ordered  SBIRT 22yo+      Most Recent Value   SBIRT (22 yo +)   In order to provide better care to our patients, we are screening all of our patients for alcohol and drug use  Would it be okay to ask you these screening questions? No Filed at: 06/23/2021 1332          Wells' Criteria for PE      Most Recent Value   Wells' Criteria for PE   Clinical signs and symptoms of DVT  0 Filed at: 06/23/2021 1255   PE is primary diagnosis or equally likely  0 Filed at: 06/23/2021 1255   HR >100  0 Filed at: 06/23/2021 1255   Immobilization at least 3 days or Surgery in the previous 4 weeks  1 5 Filed at: 06/23/2021 1255   Previous, objectively diagnosed PE or DVT  1 5 Filed at: 06/23/2021 1255   Hemoptysis  0 Filed at: 06/23/2021 1255   Malignancy with treatment within 6 months or palliative  0 Filed at: 06/23/2021 1255   Kelly Tavera' Criteria Total  3 Filed at: 06/23/2021 1255            MDM  Number of Diagnoses or Management Options  ESRD on hemodialysis [facility])  Diagnosis management comments: Patient's presentation is concerning for pneumonia given x-ray findings  Elevated dimer noted  Patient is subtherapeutic on her INR and also has history of DVT and PE, and was recently hospitalized, so will order CTA PE study  Given contrast allergy, patient will require 4 hour protocol  Patient signed out to Dr Tavo Carbone   CT ultimately showed L-sided pneumonia, patient was admitted to [facility]  Disposition  Final diagnoses:   ESRD on hemodialysis [facility])     Time reflects when diagnosis was documented in both MDM as applicable and the Disposition within this note     Time User Action Codes Description Comment    6/23/2021  3:37 PM 1001 [facility], [address] [place] [address] [N18 6,  Z99 2] ESRD on hemodialysis [facility])       ED Disposition     None      Follow-up Information    None         Current Discharge Medication List      CONTINUE these medications which have NOT CHANGED    Details   Accu-Chek Softclix Lancets lancets 3 (three) times a day Use to test blood sugar      acetaminophen (TYLENOL) 325 mg tablet Take 2 tablets (650 mg total) by mouth every 6 (six) hours as needed for mild pain or fever  Qty: 30 tablet, Refills: 0    Associated Diagnoses: Atypical chest pain      albuterol (ProAir HFA) 90 mcg/act inhaler Inhale 2 puffs every 6 (six) hours as needed for wheezing or shortness of breath  Qty: 8 5 g, Refills: 1    Comments: Substitution to a formulary equivalent within the same pharmaceutical class is authorized  Associated Diagnoses: Flu-like symptoms      ALPRAZolam (XANAX) 0 25 mg tablet Take 0 25 mg by mouth 2 (two) times a day as needed for anxiety      atorvastatin (LIPITOR) 20 mg tablet Take 20 mg by mouth every evening   Refills: 0      atropine (ISOPTO ATROPINE) 1 % ophthalmic solution Administer 1 drop to both eyes daily at bedtime       benzonatate (TESSALON PERLES) 100 mg capsule Take 1 capsule (100 mg total) by mouth 3 (three) times a day as needed for cough  Qty: 20 capsule, Refills: 0    Associated Diagnoses: ESRD on hemodialysis (HCC)      CALCIUM CARBONATE PO Take 1,000 mg by mouth daily        carvedilol (COREG) 25 mg tablet Take 25 mg by mouth 2 (two) times a day with meals        cinacalcet (SENSIPAR) 30 mg tablet Take 30 mg by mouth daily      diclofenac sodium (VOLTAREN) 1 % Apply 2 g topically 4 (four) times a day  Qty: 200 g, Refills: 0 Associated Diagnoses: Strain of cervical portion of right trapezius muscle; Right knee pain      diphenhydrAMINE (BENADRYL) 25 mg capsule Take by mouth as needed for itching        gabapentin (NEURONTIN) 100 mg capsule Take 3 capsules (300 mg total) by mouth daily at bedtime  Refills: 0    Associated Diagnoses: Chronic pain of both knees      insulin glargine (LANTUS) 100 units/mL subcutaneous injection Inject 20 Units under the skin daily at bedtime  Qty: 10 mL, Refills: 0    Associated Diagnoses: Type 2 diabetes mellitus (HCC)      KIONEX 15 GM/60ML suspension Take by mouth Takes as a shake on dialysis days Tues-Thurs_Sat  Refills: 0      levothyroxine 50 mcg tablet Take 50 mcg by mouth daily      lidocaine (LIDODERM) 5 % APPLY 1 PATCH BY TRANDERMAL ROUTE EVERY DAY (MAY WEAR UP TO 12 HOURS)        loratadine (CLARITIN) 10 mg tablet Take 10 mg by mouth daily      NOVOLOG FLEXPEN 100 units/mL SOPN INJECT 1 TO 5 UNITS SUBCUTANEOUSLY THREE TIMES A DAY BEFORE MELAS AS PER SLIDING SCALE      nystatin (MYCOSTATIN) powder Apply topically 2 (two) times a day  Qty: 15 g, Refills: 0    Associated Diagnoses: Candidiasis of breast      ondansetron (ZOFRAN) 4 mg tablet Take 1 tablet (4 mg total) by mouth every 8 (eight) hours as needed for nausea or vomiting  Qty: 12 tablet, Refills: 0    Associated Diagnoses: Nausea      pantoprazole (PROTONIX) 40 mg tablet Take 1 tablet (40 mg total) by mouth daily in the early morning  Refills: 0    Associated Diagnoses: Gastroesophageal reflux disease      senna (SENOKOT) 8 6 mg Take 2 tablets (17 2 mg total) by mouth daily at bedtime as needed for constipation  Qty: 30 each, Refills: 0    Associated Diagnoses: Ambulatory dysfunction      sertraline (ZOLOFT) 50 mg tablet Take 1 tablet (50 mg total) by mouth daily  Qty: 30 tablet, Refills: 0    Associated Diagnoses: Anxiety disorder      sevelamer (RENAGEL) 800 mg tablet Take 1 tablet (800 mg total) by mouth 3 (three) times a day with meals  Qty: 30 tablet, Refills: 0    Associated Diagnoses: ESRD on hemodialysis ([facility])      sodium chloride (OCEAN) 0 65 % nasal spray 1 spray into each nostril 3 (three) times a day as needed for congestion  Qty: 30 mL, Refills: 0    Associated Diagnoses: ESRD on hemodialysis ([facility])      torsemide (DEMADEX) 100 mg tablet Take 100 mg by mouth every 12 (twelve) hours   Refills: 0      warfarin (COUMADIN) 3 mg tablet Take 3 tablets (9 mg total) by mouth daily Takes 9 mg Monday Sat  Qty: 10 tablet, Refills: 0    Associated Diagnoses: History of DVT (deep vein thrombosis)      brimonidine (ALPHAGAN P) 0 15 % ophthalmic solution Administer 1 drop to both eyes 2 (two) times a day       Podiatric Products (Flexitol Heel Balm) OINT Apply topically once for 1 dose  Qty: 112 g, Refills: 2    Associated Diagnoses: Corns and callosities      prednisoLONE acetate (PRED FORTE) 1 % ophthalmic suspension Administer 1 drop to both eyes 4 (four) times a day Patient takes only occasionally           No discharge procedures on file  PDMP Review       Value Time User    PDMP Reviewed  Yes 11/25/2020  3:50 PM Scott Fam           ED Provider  Attending physically available and evaluated Mellissa Stein  I managed the patient along with the ED Attending      Electronically Signed by         Sharyle Breach, MD  06/24/21 6748

## 2021-06-24 ENCOUNTER — APPOINTMENT (INPATIENT)
Dept: DIALYSIS | Facility: HOSPITAL | Age: 56
DRG: 177 | End: 2021-06-24
Payer: MEDICARE

## 2021-06-24 LAB
ALBUMIN SERPL BCP-MCNC: 2.6 G/DL (ref 3.5–5)
ALP SERPL-CCNC: 153 U/L (ref 46–116)
ALT SERPL W P-5'-P-CCNC: 19 U/L (ref 12–78)
ANION GAP SERPL CALCULATED.3IONS-SCNC: 10 MMOL/L (ref 4–13)
APTT PPP: 50 SECONDS (ref 23–37)
AST SERPL W P-5'-P-CCNC: 10 U/L (ref 5–45)
BASOPHILS # BLD AUTO: 0.01 THOUSANDS/ΜL (ref 0–0.1)
BASOPHILS NFR BLD AUTO: 0 % (ref 0–1)
BILIRUB SERPL-MCNC: 0.38 MG/DL (ref 0.2–1)
BUN SERPL-MCNC: 66 MG/DL (ref 5–25)
CALCIUM ALBUM COR SERPL-MCNC: 8.8 MG/DL (ref 8.3–10.1)
CALCIUM SERPL-MCNC: 7.7 MG/DL (ref 8.3–10.1)
CHLORIDE SERPL-SCNC: 94 MMOL/L (ref 100–108)
CO2 SERPL-SCNC: 25 MMOL/L (ref 21–32)
CREAT SERPL-MCNC: 8.87 MG/DL (ref 0.6–1.3)
EOSINOPHIL # BLD AUTO: 0 THOUSAND/ΜL (ref 0–0.61)
EOSINOPHIL NFR BLD AUTO: 0 % (ref 0–6)
ERYTHROCYTE [DISTWIDTH] IN BLOOD BY AUTOMATED COUNT: 12.8 % (ref 11.6–15.1)
GFR SERPL CREATININE-BSD FRML MDRD: 5 ML/MIN/1.73SQ M
GLUCOSE SERPL-MCNC: 138 MG/DL (ref 65–140)
GLUCOSE SERPL-MCNC: 169 MG/DL (ref 65–140)
GLUCOSE SERPL-MCNC: 258 MG/DL (ref 65–140)
GLUCOSE SERPL-MCNC: 484 MG/DL (ref 65–140)
GLUCOSE SERPL-MCNC: 560 MG/DL (ref 65–140)
HCT VFR BLD AUTO: 25.7 % (ref 34.8–46.1)
HGB BLD-MCNC: 8.4 G/DL (ref 11.5–15.4)
IMM GRANULOCYTES # BLD AUTO: 0.07 THOUSAND/UL (ref 0–0.2)
IMM GRANULOCYTES NFR BLD AUTO: 1 % (ref 0–2)
INR PPP: 1.92 (ref 0.84–1.19)
LYMPHOCYTES # BLD AUTO: 0.51 THOUSANDS/ΜL (ref 0.6–4.47)
LYMPHOCYTES NFR BLD AUTO: 6 % (ref 14–44)
MAGNESIUM SERPL-MCNC: 1.9 MG/DL (ref 1.6–2.6)
MCH RBC QN AUTO: 31.2 PG (ref 26.8–34.3)
MCHC RBC AUTO-ENTMCNC: 32.7 G/DL (ref 31.4–37.4)
MCV RBC AUTO: 96 FL (ref 82–98)
MONOCYTES # BLD AUTO: 0.53 THOUSAND/ΜL (ref 0.17–1.22)
MONOCYTES NFR BLD AUTO: 6 % (ref 4–12)
NEUTROPHILS # BLD AUTO: 7.47 THOUSANDS/ΜL (ref 1.85–7.62)
NEUTS SEG NFR BLD AUTO: 87 % (ref 43–75)
NRBC BLD AUTO-RTO: 0 /100 WBCS
PHOSPHATE SERPL-MCNC: 4.5 MG/DL (ref 2.7–4.5)
PLATELET # BLD AUTO: 198 THOUSANDS/UL (ref 149–390)
PMV BLD AUTO: 10.9 FL (ref 8.9–12.7)
POTASSIUM SERPL-SCNC: 5 MMOL/L (ref 3.5–5.3)
PROCALCITONIN SERPL-MCNC: 0.27 NG/ML
PROT SERPL-MCNC: 6.8 G/DL (ref 6.4–8.2)
PROTHROMBIN TIME: 21.9 SECONDS (ref 11.6–14.5)
RBC # BLD AUTO: 2.69 MILLION/UL (ref 3.81–5.12)
SODIUM SERPL-SCNC: 129 MMOL/L (ref 136–145)
TSH SERPL DL<=0.05 MIU/L-ACNC: 0.74 UIU/ML (ref 0.36–3.74)
WBC # BLD AUTO: 8.59 THOUSAND/UL (ref 4.31–10.16)

## 2021-06-24 PROCEDURE — 80053 COMPREHEN METABOLIC PANEL: CPT | Performed by: INTERNAL MEDICINE

## 2021-06-24 PROCEDURE — 82948 REAGENT STRIP/BLOOD GLUCOSE: CPT

## 2021-06-24 PROCEDURE — 85730 THROMBOPLASTIN TIME PARTIAL: CPT | Performed by: INTERNAL MEDICINE

## 2021-06-24 PROCEDURE — 99232 SBSQ HOSP IP/OBS MODERATE 35: CPT | Performed by: PHYSICIAN ASSISTANT

## 2021-06-24 PROCEDURE — 36415 COLL VENOUS BLD VENIPUNCTURE: CPT | Performed by: INTERNAL MEDICINE

## 2021-06-24 PROCEDURE — 94668 MNPJ CHEST WALL SBSQ: CPT

## 2021-06-24 PROCEDURE — 84100 ASSAY OF PHOSPHORUS: CPT | Performed by: INTERNAL MEDICINE

## 2021-06-24 PROCEDURE — 85610 PROTHROMBIN TIME: CPT | Performed by: INTERNAL MEDICINE

## 2021-06-24 PROCEDURE — 90935 HEMODIALYSIS ONE EVALUATION: CPT | Performed by: INTERNAL MEDICINE

## 2021-06-24 PROCEDURE — 5A1D70Z PERFORMANCE OF URINARY FILTRATION, INTERMITTENT, LESS THAN 6 HOURS PER DAY: ICD-10-PCS | Performed by: INTERNAL MEDICINE

## 2021-06-24 PROCEDURE — 85025 COMPLETE CBC W/AUTO DIFF WBC: CPT | Performed by: INTERNAL MEDICINE

## 2021-06-24 PROCEDURE — 83735 ASSAY OF MAGNESIUM: CPT | Performed by: INTERNAL MEDICINE

## 2021-06-24 PROCEDURE — 84443 ASSAY THYROID STIM HORMONE: CPT

## 2021-06-24 PROCEDURE — NC001 PR NO CHARGE: Performed by: INTERNAL MEDICINE

## 2021-06-24 PROCEDURE — 84145 PROCALCITONIN (PCT): CPT | Performed by: INTERNAL MEDICINE

## 2021-06-24 RX ORDER — BENZONATATE 100 MG/1
100 CAPSULE ORAL 3 TIMES DAILY PRN
Status: DISCONTINUED | OUTPATIENT
Start: 2021-06-24 | End: 2021-06-26 | Stop reason: HOSPADM

## 2021-06-24 RX ORDER — LIDOCAINE 50 MG/G
1 PATCH TOPICAL DAILY
Status: DISCONTINUED | OUTPATIENT
Start: 2021-06-24 | End: 2021-06-26 | Stop reason: HOSPADM

## 2021-06-24 RX ORDER — INSULIN GLARGINE 100 [IU]/ML
20 INJECTION, SOLUTION SUBCUTANEOUS
Status: DISCONTINUED | OUTPATIENT
Start: 2021-06-24 | End: 2021-06-26 | Stop reason: HOSPADM

## 2021-06-24 RX ORDER — MAGNESIUM HYDROXIDE/ALUMINUM HYDROXICE/SIMETHICONE 120; 1200; 1200 MG/30ML; MG/30ML; MG/30ML
5 SUSPENSION ORAL EVERY 6 HOURS PRN
Status: DISCONTINUED | OUTPATIENT
Start: 2021-06-24 | End: 2021-06-24

## 2021-06-24 RX ORDER — CARVEDILOL 25 MG/1
25 TABLET ORAL 2 TIMES DAILY WITH MEALS
Status: DISCONTINUED | OUTPATIENT
Start: 2021-06-24 | End: 2021-06-26 | Stop reason: HOSPADM

## 2021-06-24 RX ORDER — ATORVASTATIN CALCIUM 20 MG/1
20 TABLET, FILM COATED ORAL EVERY EVENING
Status: DISCONTINUED | OUTPATIENT
Start: 2021-06-24 | End: 2021-06-26 | Stop reason: HOSPADM

## 2021-06-24 RX ORDER — LEVOTHYROXINE SODIUM 0.05 MG/1
50 TABLET ORAL
Status: DISCONTINUED | OUTPATIENT
Start: 2021-06-24 | End: 2021-06-26 | Stop reason: HOSPADM

## 2021-06-24 RX ORDER — WARFARIN SODIUM 3 MG/1
9 TABLET ORAL
Status: DISCONTINUED | OUTPATIENT
Start: 2021-06-24 | End: 2021-06-26 | Stop reason: HOSPADM

## 2021-06-24 RX ORDER — PANTOPRAZOLE SODIUM 40 MG/1
40 TABLET, DELAYED RELEASE ORAL
Status: DISCONTINUED | OUTPATIENT
Start: 2021-06-24 | End: 2021-06-26 | Stop reason: HOSPADM

## 2021-06-24 RX ORDER — SODIUM CHLORIDE, SODIUM GLUCONATE, SODIUM ACETATE, POTASSIUM CHLORIDE, MAGNESIUM CHLORIDE, SODIUM PHOSPHATE, DIBASIC, AND POTASSIUM PHOSPHATE .53; .5; .37; .037; .03; .012; .00082 G/100ML; G/100ML; G/100ML; G/100ML; G/100ML; G/100ML; G/100ML
125 INJECTION, SOLUTION INTRAVENOUS CONTINUOUS
Status: DISCONTINUED | OUTPATIENT
Start: 2021-06-24 | End: 2021-06-24

## 2021-06-24 RX ORDER — ALBUTEROL SULFATE 90 UG/1
2 AEROSOL, METERED RESPIRATORY (INHALATION) EVERY 6 HOURS PRN
Status: DISCONTINUED | OUTPATIENT
Start: 2021-06-24 | End: 2021-06-26 | Stop reason: HOSPADM

## 2021-06-24 RX ORDER — SEVELAMER HYDROCHLORIDE 800 MG/1
800 TABLET, FILM COATED ORAL
Status: DISCONTINUED | OUTPATIENT
Start: 2021-06-24 | End: 2021-06-26 | Stop reason: HOSPADM

## 2021-06-24 RX ORDER — CINACALCET 30 MG/1
30 TABLET, FILM COATED ORAL DAILY
Status: DISCONTINUED | OUTPATIENT
Start: 2021-06-24 | End: 2021-06-26 | Stop reason: HOSPADM

## 2021-06-24 RX ORDER — TORSEMIDE 20 MG/1
100 TABLET ORAL EVERY 12 HOURS
Status: DISCONTINUED | OUTPATIENT
Start: 2021-06-24 | End: 2021-06-26 | Stop reason: HOSPADM

## 2021-06-24 RX ORDER — DOCUSATE SODIUM 100 MG/1
100 CAPSULE, LIQUID FILLED ORAL 2 TIMES DAILY PRN
Status: DISCONTINUED | OUTPATIENT
Start: 2021-06-24 | End: 2021-06-26 | Stop reason: HOSPADM

## 2021-06-24 RX ORDER — INSULIN GLARGINE 100 [IU]/ML
10 INJECTION, SOLUTION SUBCUTANEOUS ONCE
Status: COMPLETED | OUTPATIENT
Start: 2021-06-24 | End: 2021-06-24

## 2021-06-24 RX ORDER — ACETAMINOPHEN 325 MG/1
650 TABLET ORAL EVERY 6 HOURS PRN
Status: DISCONTINUED | OUTPATIENT
Start: 2021-06-24 | End: 2021-06-26 | Stop reason: HOSPADM

## 2021-06-24 RX ORDER — CALCIUM CARBONATE 200(500)MG
1000 TABLET,CHEWABLE ORAL DAILY
Status: DISCONTINUED | OUTPATIENT
Start: 2021-06-24 | End: 2021-06-26 | Stop reason: HOSPADM

## 2021-06-24 RX ORDER — ONDANSETRON 2 MG/ML
4 INJECTION INTRAMUSCULAR; INTRAVENOUS EVERY 6 HOURS PRN
Status: DISCONTINUED | OUTPATIENT
Start: 2021-06-24 | End: 2021-06-26 | Stop reason: HOSPADM

## 2021-06-24 RX ORDER — GABAPENTIN 300 MG/1
300 CAPSULE ORAL
Status: DISCONTINUED | OUTPATIENT
Start: 2021-06-24 | End: 2021-06-26 | Stop reason: HOSPADM

## 2021-06-24 RX ORDER — ECHINACEA PURPUREA EXTRACT 125 MG
1 TABLET ORAL
Status: DISCONTINUED | OUTPATIENT
Start: 2021-06-24 | End: 2021-06-26 | Stop reason: HOSPADM

## 2021-06-24 RX ORDER — LORATADINE 10 MG/1
10 TABLET ORAL DAILY
Status: DISCONTINUED | OUTPATIENT
Start: 2021-06-24 | End: 2021-06-26 | Stop reason: HOSPADM

## 2021-06-24 RX ORDER — ALPRAZOLAM 0.25 MG/1
0.25 TABLET ORAL 2 TIMES DAILY PRN
Status: DISCONTINUED | OUTPATIENT
Start: 2021-06-24 | End: 2021-06-26 | Stop reason: HOSPADM

## 2021-06-24 RX ORDER — ATROPINE SULFATE 10 MG/ML
1 SOLUTION/ DROPS OPHTHALMIC
Status: DISCONTINUED | OUTPATIENT
Start: 2021-06-24 | End: 2021-06-26 | Stop reason: HOSPADM

## 2021-06-24 RX ORDER — GUAIFENESIN 600 MG
600 TABLET, EXTENDED RELEASE 12 HR ORAL 2 TIMES DAILY
Status: DISCONTINUED | OUTPATIENT
Start: 2021-06-24 | End: 2021-06-26 | Stop reason: HOSPADM

## 2021-06-24 RX ORDER — BRIMONIDINE TARTRATE 0.15 %
1 DROPS OPHTHALMIC (EYE) 2 TIMES DAILY
Status: DISCONTINUED | OUTPATIENT
Start: 2021-06-24 | End: 2021-06-26 | Stop reason: HOSPADM

## 2021-06-24 RX ADMIN — GUAIFENESIN 600 MG: 600 TABLET, EXTENDED RELEASE ORAL at 17:09

## 2021-06-24 RX ADMIN — CEFEPIME HYDROCHLORIDE 1000 MG: 1 INJECTION, POWDER, FOR SOLUTION INTRAMUSCULAR; INTRAVENOUS at 23:58

## 2021-06-24 RX ADMIN — DOXERCALCIFEROL 2 MCG: 4 INJECTION, SOLUTION INTRAVENOUS at 09:18

## 2021-06-24 RX ADMIN — ACETAMINOPHEN 650 MG: 325 TABLET, FILM COATED ORAL at 11:50

## 2021-06-24 RX ADMIN — INSULIN LISPRO 1 UNITS: 100 INJECTION, SOLUTION INTRAVENOUS; SUBCUTANEOUS at 22:02

## 2021-06-24 RX ADMIN — SEVELAMER HYDROCHLORIDE 800 MG: 800 TABLET, FILM COATED PARENTERAL at 13:37

## 2021-06-24 RX ADMIN — INSULIN HUMAN 10 UNITS: 100 INJECTION, SOLUTION PARENTERAL at 06:32

## 2021-06-24 RX ADMIN — ATROPINE SULFATE 1 DROP: 10 SOLUTION/ DROPS OPHTHALMIC at 22:02

## 2021-06-24 RX ADMIN — SEVELAMER HYDROCHLORIDE 800 MG: 800 TABLET, FILM COATED PARENTERAL at 17:08

## 2021-06-24 RX ADMIN — ANTACID TABLETS 1000 MG: 500 TABLET, CHEWABLE ORAL at 13:37

## 2021-06-24 RX ADMIN — LEVOTHYROXINE SODIUM 50 MCG: 50 TABLET ORAL at 05:39

## 2021-06-24 RX ADMIN — INSULIN GLARGINE 10 UNITS: 100 INJECTION, SOLUTION SUBCUTANEOUS at 06:33

## 2021-06-24 RX ADMIN — INSULIN GLARGINE 20 UNITS: 100 INJECTION, SOLUTION SUBCUTANEOUS at 22:01

## 2021-06-24 RX ADMIN — DICLOFENAC SODIUM 2 G: 10 GEL TOPICAL at 13:35

## 2021-06-24 RX ADMIN — LIDOCAINE 1 PATCH: 50 PATCH TOPICAL at 13:35

## 2021-06-24 RX ADMIN — ONDANSETRON 4 MG: 2 INJECTION INTRAMUSCULAR; INTRAVENOUS at 17:50

## 2021-06-24 RX ADMIN — TORSEMIDE 100 MG: 20 TABLET ORAL at 13:36

## 2021-06-24 RX ADMIN — PANTOPRAZOLE SODIUM 40 MG: 40 TABLET, DELAYED RELEASE ORAL at 05:39

## 2021-06-24 RX ADMIN — LORATADINE 10 MG: 10 TABLET ORAL at 13:36

## 2021-06-24 RX ADMIN — ALPRAZOLAM 0.25 MG: 0.25 TABLET ORAL at 08:04

## 2021-06-24 RX ADMIN — TORSEMIDE 100 MG: 20 TABLET ORAL at 22:00

## 2021-06-24 RX ADMIN — SERTRALINE HYDROCHLORIDE 50 MG: 50 TABLET ORAL at 13:37

## 2021-06-24 RX ADMIN — INSULIN LISPRO 6 UNITS: 100 INJECTION, SOLUTION INTRAVENOUS; SUBCUTANEOUS at 17:51

## 2021-06-24 RX ADMIN — WARFARIN SODIUM 9 MG: 3 TABLET ORAL at 17:09

## 2021-06-24 RX ADMIN — SODIUM CHLORIDE, SODIUM GLUCONATE, SODIUM ACETATE, POTASSIUM CHLORIDE, MAGNESIUM CHLORIDE, SODIUM PHOSPHATE, DIBASIC, AND POTASSIUM PHOSPHATE 125 ML/HR: .53; .5; .37; .037; .03; .012; .00082 INJECTION, SOLUTION INTRAVENOUS at 06:36

## 2021-06-24 RX ADMIN — DOXYCYCLINE 100 MG: 100 INJECTION, POWDER, LYOPHILIZED, FOR SOLUTION INTRAVENOUS at 00:01

## 2021-06-24 RX ADMIN — CARVEDILOL 25 MG: 25 TABLET, FILM COATED ORAL at 17:09

## 2021-06-24 RX ADMIN — CINACALCET 30 MG: 30 TABLET ORAL at 13:37

## 2021-06-24 RX ADMIN — ATORVASTATIN CALCIUM 20 MG: 20 TABLET, FILM COATED ORAL at 17:09

## 2021-06-24 RX ADMIN — GABAPENTIN 300 MG: 300 CAPSULE ORAL at 22:05

## 2021-06-24 NOTE — PLAN OF CARE
Problem: PAIN - ADULT  Goal: Verbalizes/displays adequate comfort level or baseline comfort level  Description: Interventions:  - Encourage patient to monitor pain and request assistance  - Assess pain using appropriate pain scale  - Administer analgesics based on type and severity of pain and evaluate response  - Implement non-pharmacological measures as appropriate and evaluate response  - Consider cultural and social influences on pain and pain management  - Notify physician/advanced practitioner if interventions unsuccessful or patient reports new pain  Outcome: Progressing     Problem: INFECTION - ADULT  Goal: Absence or prevention of progression during hospitalization  Description: INTERVENTIONS:  - Assess and monitor for signs and symptoms of infection  - Monitor lab/diagnostic results  - Monitor all insertion sites, i e  indwelling lines, tubes, and drains  - Monitor endotracheal if appropriate and nasal secretions for changes in amount and color  - [place] appropriate cooling/warming therapies per order  - Administer medications as ordered  - Instruct and encourage patient and family to use good hand hygiene technique  - Identify and instruct in appropriate isolation precautions for identified infection/condition  Outcome: Progressing     Problem: SAFETY ADULT  Goal: Patient will remain free of falls  Description: INTERVENTIONS:  - Educate patient/family on patient safety including physical limitations  - Instruct patient to call for assistance with activity   - Consult OT/PT to assist with strengthening/mobility   - Keep Call bell within reach  - Keep bed low and locked with side rails adjusted as appropriate  - Keep care items and personal belongings within reach  - Initiate and maintain comfort rounds  - Make Fall Risk Sign visible to staff  - Apply yellow socks and bracelet for high fall risk patients  - Consider moving patient to room near nurses station  Outcome: Progressing  Goal: Maintain or return to baseline ADL function  Description: INTERVENTIONS:  -  Assess patient's ability to carry out ADLs; assess patient's baseline for ADL function and identify physical deficits which impact ability to perform ADLs (bathing, care of mouth/teeth, toileting, grooming, dressing, etc )  - Assess/evaluate cause of self-care deficits   - Assess range of motion  - Assess patient's mobility; develop plan if impaired  - Assess patient's need for assistive devices and provide as appropriate  - Encourage maximum independence but intervene and supervise when necessary  - Involve family in performance of ADLs  - Assess for home care needs following discharge   - Consider OT consult to assist with ADL evaluation and planning for discharge  - Provide patient education as appropriate  Outcome: Progressing  Goal: Maintains/Returns to pre admission functional level  Description: INTERVENTIONS:  - Perform BMAT or MOVE assessment daily    - Set and communicate daily mobility goal to care team and patient/family/caregiver     - Collaborate with rehabilitation services on mobility goals if consulted  Problem: DISCHARGE PLANNING  Goal: Discharge to home or other facility with appropriate resources  Description: INTERVENTIONS:  - Identify barriers to discharge w/patient and caregiver  - Arrange for needed discharge resources and transportation as appropriate  - Identify discharge learning needs (meds, wound care, etc )  - Arrange for interpretive services to assist at discharge as needed  - Refer to Case Management Department for coordinating discharge planning if the patient needs post-hospital services based on physician/advanced practitioner order or complex needs related to functional status, cognitive ability, or social support system  Outcome: Progressing     - Out of bed for toileting  - Record patient progress and toleration of activity level   Outcome: Progressing

## 2021-06-24 NOTE — ASSESSMENT & PLAN NOTE
Lab Results   Component Value Date    HGBA1C 8 4 (H) 06/11/2021     Recent Labs     06/24/21  0756 06/24/21  1326   POCGLU 484* 138     Blood Sugar Average: Last 72 hrs:  (P) 311     · Continue glargine insulin 20 units at night  · NovoLog/Humalog sliding scale  Accu-Cheks per protocol

## 2021-06-24 NOTE — ASSESSMENT & PLAN NOTE
Lab Results   Component Value Date    HGBA1C 8 4 (H) 06/11/2021     Continue glargine insulin 20 units at night  NovoLog/Humalog sliding scale  Accu-Cheks per protocol  No results for input(s): POCGLU in the last 72 hours      Blood Sugar Average: Last 72 hrs:

## 2021-06-24 NOTE — PROGRESS NOTES
[address] [facility]  Progress Note Bhargavi Mack 1965, 49 y o  female MRN: [id number]  Unit/Bed#: -01 Encounter: [id number]  Primary Care Provider: Cherylene Fish, MD   Date and time admitted to hospital: 6/23/2021 12:10 PM    * Healthcare-associated pneumonia  Assessment & Plan  · Suspect gram-negative bacterial pneumonia in setting of recent hospitalization  · Continue IV cefepime  · Procalcitonin is slightly elevated @ 0 27  Repeat in AM   · Urine strep and legionella: pending  · MRSA nasal screen: pending    Type 2 diabetes mellitus [facility])  Assessment & Plan  Lab Results   Component Value Date    HGBA1C 8 4 (H) 06/11/2021     Recent Labs     06/24/21  0756 06/24/21  1326   POCGLU 484* 138     Blood Sugar Average: Last 72 hrs:  (P) 311     · Continue glargine insulin 20 units at night  · NovoLog/Humalog sliding scale  Accu-Cheks per protocol  Anxiety disorder  Assessment & Plan  · Continue Xanax 0 25 mg b i d  p r n   · Zoloft 50 mg daily  ESRD on hemodialysis [facility])  Assessment & Plan  Lab Results   Component Value Date    EGFR 5 06/24/2021    EGFR 6 06/23/2021    EGFR 7 06/10/2021    CREATININE 8 87 (H) 06/24/2021    CREATININE 7 47 (H) 06/23/2021    CREATININE 6 39 (H) 06/10/2021   · On hemodialysis Monday Wednesdays Fridays  · Nephrology consulted and appreciate their input  History of DVT (deep vein thrombosis)  Assessment & Plan  · Currently on warfarin at 9 mg daily  · INR: 1 92    Hypertension  Assessment & Plan  · Continue Coreg 25 mg BID  · Also on torsemide 100 mg BID  · BPs are volatile  Renal following and appreciate their input  VTE Pharmacologic Prophylaxis:   High Risk (Score >/= 5) - Pharmacological DVT Prophylaxis Ordered: warfarin (Coumadin)  Sequential Compression Devices Ordered  Patient Centered Rounds: I performed bedside rounds with nursing staff today    Discussions with Specialists or Other Care Team Provider: Renal    Education and Discussions with Family / Patient: Updated  () at bedside  Time Spent for Care: 30 minutes  More than 50% of total time spent on counseling and coordination of care as described above  Current Length of Stay: 1 day(s)  Current Patient Status: Inpatient   Certification Statement: The patient will continue to require additional inpatient hospital stay due to continued need for IV antibiotics  Discharge Plan: Anticipate discharge in 48-72 hrs to home  Code Status: Level 1 - Full Code    Subjective:   Patient continues to have a productive cough with green phlegm  She still has some shortness of breath and overall does not feel much different from when she arrived  No new complaints otherwise  Objective:     Vitals:   Temp (24hrs), Av 9 °F (36 6 °C), Min:97 7 °F (36 5 °C), Max:98 °F (36 7 °C)    Temp:  [97 7 °F (36 5 °C)-98 °F (36 7 °C)] 98 °F (36 7 °C)  HR:  [] 77  Resp:  [16-18] 18  BP: (101-215)/() 179/68  SpO2:  [94 %-98 %] 97 %  Body mass index is 44 39 kg/m²  Input and Output Summary (last 24 hours): Intake/Output Summary (Last 24 hours) at 2021 1358  Last data filed at 2021 1230  Gross per 24 hour   Intake 1285 ml   Output 1500 ml   Net -215 ml       Physical Exam:   Physical Exam  Vitals and nursing note reviewed  Constitutional:       General: She is not in acute distress  Appearance: She is well-developed  HENT:      Head: Normocephalic and atraumatic  Eyes:      Conjunctiva/sclera: Conjunctivae normal    Cardiovascular:      Rate and Rhythm: Normal rate and regular rhythm  Heart sounds: No murmur heard  Pulmonary:      Effort: Pulmonary effort is normal  No respiratory distress  Breath sounds: Decreased breath sounds present  Abdominal:      Palpations: Abdomen is soft  Tenderness: There is no abdominal tenderness  Musculoskeletal:      Cervical back: Neck supple     Skin:     General: Skin is warm and dry  Neurological:      Mental Status: She is alert         Additional Data:     Labs:  Results from last 7 days   Lab Units 06/24/21  0457   WBC Thousand/uL 8 59   HEMOGLOBIN g/dL 8 4*   HEMATOCRIT % 25 7*   PLATELETS Thousands/uL 198   NEUTROS PCT % 87*   LYMPHS PCT % 6*   MONOS PCT % 6   EOS PCT % 0     Results from last 7 days   Lab Units 06/24/21  0456   SODIUM mmol/L 129*   POTASSIUM mmol/L 5 0   CHLORIDE mmol/L 94*   CO2 mmol/L 25   BUN mg/dL 66*   CREATININE mg/dL 8 87*   ANION GAP mmol/L 10   CALCIUM mg/dL 7 7*   ALBUMIN g/dL 2 6*   TOTAL BILIRUBIN mg/dL 0 38   ALK PHOS U/L 153*   ALT U/L 19   AST U/L 10   GLUCOSE RANDOM mg/dL 560*     Results from last 7 days   Lab Units 06/24/21  0456   INR  1 92*     Results from last 7 days   Lab Units 06/24/21  1326 06/24/21  0756   POC GLUCOSE mg/dl 138 484*         Results from last 7 days   Lab Units 06/24/21  0456   PROCALCITONIN ng/ml 0 27*       Lines/Drains:  Invasive Devices     Line            Hemodialysis AV Fistula 02/20/18 Left Forearm 1219 days                      Imaging: Reviewed radiology reports from this admission including: chest CT scan    Recent Cultures (last 7 days):         Last 24 Hours Medication List:   Current Facility-Administered Medications   Medication Dose Route Frequency Provider Last Rate    acetaminophen  650 mg Oral Q6H PRN Deena Hammond MD      albuterol  2 puff Inhalation Q6H PRN Deena Hammond MD      ALPRAZolam  0 25 mg Oral BID PRN Deena Hammond MD      atorvastatin  20 mg Oral QPM Deena Hammond MD      atropine  1 drop Both Eyes HS Deena Hammond MD      benzonatate  100 mg Oral TID PRN Deena Hammond MD      brimonidine  1 drop Both Eyes BID Deena Hammond MD      calcium carbonate  1,000 mg Oral Daily Deena Hammond MD      carvedilol  25 mg Oral BID With Meals Deena Hammond MD      cefepime  1,000 mg Intravenous Q24H Deena Hammond MD      cinacalcet  30 mg Oral Daily Matt Almaguer MD      Diclofenac Sodium  2 g Topical 4x Daily Matt Almaguer MD      docusate sodium  100 mg Oral BID PRN Matt Almaguer MD      doxercalciferol  2 mcg Intravenous After Dialysis Matt Alamguer MD      gabapentin  300 mg Oral HS Matt Almaguer MD      guaiFENesin  600 mg Oral BID Matt Almaguer MD      insulin glargine  20 Units Subcutaneous HS Matt Almaguer MD      insulin lispro  1-5 Units Subcutaneous HS Matt Almaguer MD      insulin lispro  2-12 Units Subcutaneous TID [facility] Matt Almaguer MD      levothyroxine  50 mcg Oral Early Morning Matt Almaguer MD      lidocaine  1 patch Topical Daily Matt Almaguer MD      loratadine  10 mg Oral Daily Matt Almaguer MD      ondansetron  4 mg Intravenous Q6H PRN Matt Almaguer MD      pantoprazole  40 mg Oral Early Morning Matt Almaguer MD      sertraline  50 mg Oral Daily Matt Almaguer MD      sevelamer  800 mg Oral TID With Meals Matt Almaguer MD      sodium chloride  1 spray Each Nare Q1H PRN Matt Almaguer MD      torsemide  100 mg Oral Q12H Matt Almaguer MD      warfarin  9 mg Oral Daily (warfarin) Matt Almaguer MD          Today, Patient Was Seen By: Dee Montoya PA-C    **Please Note: This note may have been constructed using a voice recognition system  **

## 2021-06-24 NOTE — RESPIRATORY THERAPY NOTE
RT Protocol Note  Erna Vital 49 y o  female MRN: [id number]  Unit/Bed#: ED 26 Encounter: [id number]    Assessment    Principal Problem:    Healthcare-associated pneumonia  Active Problems:    Hypertension    History of DVT (deep vein thrombosis)    Glaucoma    ESRD on hemodialysis (HCC)    Anxiety disorder    Type 2 diabetes mellitus (HCC)    Esophageal reflux    Hyperlipemia      Home Pulmonary Medications:  ProAir MDIf       Past Medical History:   Diagnosis Date    Abnormal uterine bleeding (AUB)     Anxiety     Arthritis     Chronic kidney disease     Claustrophobia     Diabetes mellitus ([place] [id number] )     Disease of thyroid gland     DVT (deep venous thrombosis) (HCC)     End stage kidney disease ([place] [address] )     Foot ulcer due to secondary DM ([place] [address] )     Hemodialysis patient ([place] [address] )     Tues, Thurs, Sat    Hypertension     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[place] [address] )     right eye    Morbid obesity ([place] [address] )     Osteomyelitis of fifth toe of right foot ([place] [address] )     Panic attacks     Pul[facility] embolism ([facility])     Reflux esophagitis     Thrombophlebitis of saphenous vein     Warfarin anticoagulation      Social History     Socioeconomic History    Marital status: Single     Spouse name: None    Number of children: None    Years of education: None    Highest education level: None   Occupational History    None   Tobacco Use    Smoking status: Never Smoker    Smokeless tobacco: Never Used   Vaping Use    Vaping Use: Never used   Substance and Sexual Activity    Alcohol use: Never     Alcohol/week: 0 0 standard drinks    Drug use: No    Sexual activity: Not Currently     Partners: Male     Birth control/protection: Abstinence   Other Topics Concern    None   Social History Narrative    None     Social Determinants of Health     Financial Resource Strain:     Difficulty of Paying Living Expenses:    Food Insecurity:     Worried About Running Out of Food in the Last Year:     Ran Out of Food in the Last Year:    Transportation Needs:     Lack of Transportation (Medical):  Lack of Transportation (Non-Medical):    Physical Activity:     Days of Exercise per Week:     Minutes of Exercise per Session:    Stress:     Feeling of Stress :    Social Connections:     Frequency of Communication with Friends and Family:     Frequency of Social Gatherings with Friends and Family:     Attends [employer] Services:     Active Member of Clubs or Organizations:     Attends Club or Organization Meetings:     Marital Status:    Intimate Partner Violence:     Fear of Current or Ex-Partner:     Emotionally Abused:     Physically Abused:     Sexually Abused:        Subjective         Objective    Physical Exam:   Assessment Type: Assess only  General Appearance: Drowsy, Sleeping  Respiratory Pattern: Dyspnea with exertion  Chest Assessment: Trachea midline  Bilateral Breath Sounds: Diminished  Cough: None  O2 Device: (P) room air    Vitals:  Blood pressure 151/65, pulse 67, temperature 97 9 °F (36 6 °C), temperature source Oral, resp  rate 16, last menstrual period 02/12/2016, SpO2 98 %, not currently breastfeeding  Imaging and other studies: I have personally reviewed pertinent reports  O2 Device: (P) room air     Plan    Respiratory Plan: Mild Distress pathway        Resp Comments: (P) Pt is a 49 y o  female who has past medical history significant for morbid obesity with diabetes mellitus type 2 currently on insulin, hypertension, hyperlipidemia, history of depression and anxiety recently was in the hospital for anxiety  The patient was recently discharged and since then was complaining of having cough and somewhat feverish  She also reports that she may be feverish last night and hence she went to her primary care physician the order the x-ray that showed a possible left lower lobe pneumonia  HD chest CT was then taken after steroid prophylaxis was given    She was then found to have a right-sided infiltrate consistent with possible pneumonia  Pt's only pulm med for home usage is a ProAir MDI  Radiologic studies this morning show Consolidation in the left upper lobe anteriorly and posterior medially could relate to pneumonia /infection  Will encourage the hourly use of our flutter valve and our IS devices and support the previously ordered  Proventil MDI Q6 PRN

## 2021-06-24 NOTE — ASSESSMENT & PLAN NOTE
· Continue Coreg 25 mg BID  · Also on torsemide 100 mg BID  · BPs are volatile  Renal following and appreciate their input

## 2021-06-24 NOTE — H&P
[address] [facility]  H&P- Leonila Narayan 1965, 49 y o  female MRN: [id number]  Unit/Bed#: ED 26 Encounter: [id number]  Primary Care Provider: Clinton Hernandez MD   Date and time admitted to hospital: 6/23/2021 12:10 PM    * Healthcare-associated pneumonia  Assessment & Plan  Patient was started on cefepime with doxycycline  Continue with cefepime for now  MRSA nasal screen  Blood culture and pneumonia studies per protocol  Respiratory support and supplementation with oxygen as needed  Case management  Follow CBC with metabolic profile  ESRD on hemodialysis [facility])  Assessment & Plan  Lab Results   Component Value Date    EGFR 6 06/23/2021    EGFR 7 06/10/2021    EGFR 10 06/09/2021    CREATININE 7 47 (H) 06/23/2021    CREATININE 6 39 (H) 06/10/2021    CREATININE 4 57 (H) 06/09/2021   On hemodialysis Monday Wednesdays Fridays  Nephrology consult  Hypertension  Assessment & Plan  Continue Coreg 25 mg b i d   Also on torsemide 100 mg    Esophageal reflux  Assessment & Plan  Continue pantoprazole 40 mg daily  Type 2 diabetes mellitus (HCC)  Assessment & Plan  Lab Results   Component Value Date    HGBA1C 8 4 (H) 06/11/2021     Continue glargine insulin 20 units at night  NovoLog/Humalog sliding scale  Accu-Cheks per protocol  No results for input(s): POCGLU in the last 72 hours  Blood Sugar Average: Last 72 hrs:      Hyperlipemia  Assessment & Plan  Atorvastatin 20 mg daily    Anxiety disorder  Assessment & Plan  Continue Xanax 0 25 mg b i d  p r n  Zoloft 50 mg daily  Glaucoma  Assessment & Plan  On brimonidine eyedrops b i d  History of DVT (deep vein thrombosis)  Assessment & Plan  Currently on warfarin at 9 mg daily  Check PT INR            VTE Prophylaxis: Warfarin (Coumadin)  / sequential compression device   Code Status: Prior full Code  POLST: There is no POLST form on file for this patient (pre-hospital)    Anticipated Length of Stay:  Patient will be admitted on an Inpatient basis with an anticipated length of stay of  greater than 2 midnights  Justification for Hospital Stay: Please see detailed plans noted above  Chief Complaint:     Chills and feeling feverish  History of Present Illness:  Yossi Reynaga is a 49 y o  female who has past medical history significant for morbid obesity with diabetes mellitus type 2 currently on insulin, hypertension, hyperlipidemia, history of depression and anxiety recently was in the hospital for anxiety  The patient was recently discharged and since then was complaining of having cough and somewhat feverish  She also reports that she may be feverish last night and hence she went to her primary care physician the order the x-ray that showed a possible left lower lobe pneumonia  HD chest CT was then taken after steroid prophylaxis was given  She was then found to have a right-sided infiltrate consistent with possible pneumonia  Hence the patient was then admitted for further antibiotic treatment and respiratory support  Currently is better and when seen is sleeping on her right side  Review of Systems:    Constitutional:  Initially with complaints of chills  Eyes:  Denies change in visual acuity   HENT:  Denies nasal congestion or sore throat   Respiratory:  Initially with cough      Cardiovascular:  Denies chest pain or edema   GI:  Denies abdominal pain, nausea, vomiting, bloody stools or diarrhea   :  Denies dysuria   Musculoskeletal:  Denies back pain or joint pain   Integument:  Denies rash   Neurologic:  Denies headache, focal weakness or sensory changes   Endocrine:  Denies polyuria or polydipsia   Lymphatic:  Denies swollen glands   Psychiatric:  Denies depression or anxiety     Past Medical and Surgical History:   Past Medical History:   Diagnosis Date    Abnormal uterine bleeding (AUB)     Anxiety     Arthritis     Chronic kidney disease     Claustrophobia     Diabetes mellitus [facility] [address] [facility]     Disease of thyroid gland     DVT (deep venous thrombosis) (HCC)     End stage kidney disease (HCC)     Foot ulcer due to secondary DM ([facility] [address] )     Hemodialysis patient ([facility] [address] )     Tues, Thchandu, Sat    Hypertension     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[facility] [address] )     right eye    Morbid obesity ([facility] [address] )     Osteomyelitis of fifth toe of right foot ([facility] [address] )     Panic attacks     Pulmonary embolism (HCC)     Reflux esophagitis     Thrombophlebitis of saphenous vein     Warfarin anticoagulation      Past Surgical History:   Procedure Laterality Date    AMPUTATION      r 4th toe    ARTERIOVENOUS GRAFT PLACEMENT      CATARACT EXTRACTION Bilateral     CERVICAL BIOPSY  W/ LOOP ELECTRODE EXCISION       SECTION      DIALYSIS FISTULA CREATION      DILATION AND CURETTAGE OF UTERUS      ENDOMETRIAL ABLATION W/ NOVASURE      EYE SURGERY      HYSTERECTOMY      @ age 55 (complete)    IR AV FISTULAGRAM/GRAFTOGRAM  10/11/2019    IR AV FISTULAGRAM/GRAFTOGRAM  2020    IR AV FISTULAGRAM/GRAFTOGRAM  2020    OOPHORECTOMY Bilateral     @ age 55    PERICARDIUM SURGERY      ME AMPUTATION TOE,MT-P JT Right 2020    Procedure: AMPUTATION TOE- 5th;  Surgeon: Ced Smith DPM;  Location: [facility];  Service: Podiatry    ME COLONOSCOPY FLX DX W/COLLJ SPEC WHEN PFRMD N/A 3/13/2019    Procedure: COLONOSCOPY;  Surgeon: Roberta Alejandro MD;  Location: [facility]; Service: Gastroenterology    ME ESOPHAGOGASTRODUODENOSCOPY TRANSORAL DIAGNOSTIC N/A 3/13/2019    Procedure: ESOPHAGOGASTRODUODENOSCOPY (EGD); Surgeon: Roberta Alejandro MD;  Location: [facility];   Service: Gastroenterology    ME LAPAROSCOPY W TOT HYSTERECT UTERUS 250 GRAM OR LESS N/A 2016    Procedure: HYSTERECTOMY LAPAROSCOPIC TOTAL [facility]), with bilateral salpingectomy;  Surgeon: Jovanni Hernandez DO;  [facility];  Service: Gynecology    THROMBECTOMY / ARTERIOVENOUS GRAFT REVISION      TOE SURGERY Right     removal of the 4th toe Meds/Allergies:  (Not in a hospital admission)      Allergies: Allergies   Allergen Reactions    Iodinated Diagnostic Agents Itching     History:  Marital Status: Single   Occupation:  Disabled  Patient Pre-hospital Living Situation:  Lives at home  Patient Pre-hospital Level of Mobility:  Ambulatory  Patient Pre-hospital Diet Restrictions:  Diabetic cardiac  Substance Use History:   Social History     Substance and Sexual Activity   Alcohol Use Never    Alcohol/week: 0 0 standard drinks     Social History     Tobacco Use   Smoking Status Never Smoker   Smokeless Tobacco Never Used     Social History     Substance and Sexual Activity   Drug Use No       Family History:  Family History   Problem Relation Age of Onset    Heart disease Family     Diabetes Family     Heart disease Mother     Cancer Brother         neck    Throat cancer Brother     Ovarian cancer Maternal Aunt 40       Physical Exam:     Vitals:   Blood Pressure: (!) 112/46 (06/23/21 1936)  Pulse: 67 (06/23/21 1936)  Temperature: 97 9 °F (36 6 °C) (06/23/21 1424)  Temp Source: Oral (06/23/21 1424)  Respirations: 18 (06/23/21 1936)  SpO2: 94 % (06/23/21 1936)    Constitutional:  Well developed, morbidly obese, no acute distress, non-toxic appearance   Eyes:  PERRL, conjunctiva normal   HENT:  Atraumatic, external ears normal, nose normal, oropharynx moist, no pharyngeal exudates  Neck- normal range of motion, no tenderness, supple   Respiratory:  No respiratory distress, normal breath sounds, no rales, no wheezing no rales appreciated or crackles  Cardiovascular:  Normal rate, normal rhythm, no murmurs, no gallops, no rubs   GI:  Soft, nondistended, normal bowel sounds, nontender, no organomegaly, no mass, no rebound, no guarding   :  No costovertebral angle tenderness   Musculoskeletal:  No edema, no tenderness, no deformities   Back- no tenderness  Integument:  Well hydrated, no rash   Lymphatic:  No lymphadenopathy noted   Neurologic: Alert &awake, communicative, CN 2-12 normal, normal motor function, normal sensory function, no focal deficits noted   Psychiatric:  Speech and behavior appropriate       Lab Results: I have personally reviewed pertinent reports  Results from last 7 days   Lab Units 21  1310   WBC Thousand/uL 10 44*   HEMOGLOBIN g/dL 8 9*   HEMATOCRIT % 27 4*   PLATELETS Thousands/uL 214   NEUTROS PCT % 82*   LYMPHS PCT % 10*   MONOS PCT % 7   EOS PCT % 0     Results from last 7 days   Lab Units 21  1253   POTASSIUM mmol/L 4 8   CHLORIDE mmol/L 97*   CO2 mmol/L 27   BUN mg/dL 47*   CREATININE mg/dL 7 47*   CALCIUM mg/dL 8 8   ALK PHOS U/L 184*   ALT U/L 23   AST U/L 16     Results from last 7 days   Lab Units 21  1310   INR  1 84*           Imaging: I have personally reviewed pertinent films in PACS    EEG awake or drowsy routine    Result Date: 2021  Narrative: ELECTROENCEPHALOGRAM (EEG) Patient Name:  Leonila Narayan  MRN: [id number] :  1965 File #: Jennifer Doctor  Age: 49 y o  Ordering Provider: LOIS Brownlee Date performed: 2021        Report date: 2021      Study type: Routine EEG ICD 10 diagnosis: Spells/Fit NOS R56 9 and Encephalopathy, unspecified G93 40 Start time:  End time: 3543 --------------------------------------------------------------------------- ---------------------------------------- Patient History: This recording was performed in a 49 y o  female with ESRD and a recent event involving acute aphasia and left facial droop during HD to evaluate seizure risk  Medications include: lorazepam, gabapentin, alprazolam  --------------------------------------------------------------------------- ---------------------------------------- Description of Procedure: · 32 channel digital recording with electrodes placed according to the International 10-20 system with additional T1/T2 electrodes, EOG, EKG, and simultaneous video  The recording was technically satisfactory  --------------------------------------------------------------------------- ---------------------------------------- EEG Description: The recording was performed with the patient awake, drowsy, and clinically apparent sleep  She was oriented  During wakefulness, there were runs of poorly regulated, low amplitude, posteriorly dominant, symmetric 7 5-8 cps alpha rhythm that attenuated with eye opening  There were symmetric low amplitude, frontally dominant beta activities  There were intermittent intermixed mixed frequency theta activities  With drowsiness, there were diffuse mixed frequency theta and frequent bursts of moderate amplitude mixed frequency delta  Features of stage 2 sleep were not present  There were occasional medium amplitude left temporal delta activities  Activation Procedures: Hyperventilation was not performed as a precaution during the COVID-19 pandemic  Stepped photic stimulation between 1-30 cps did not induce any changes  Other findings: Samples of the single channel ECG demonstrated a regular rhythm  Events: No significant push buttons were activated  --------------------------------------------------------------------------- ---------------------------------------- EEG Interpretation: This Routine EEG recorded during wakefulness, drowsiness, and clinically apparent sleep is abnormal  Slowing of the posteriorly dominant rhythm suggests mild nonspecific diffuse cerebral dysfunction  Occasional left temporal delta slowing suggests underlying focal neuronal dysfunction  No epileptiform discharges are present  Ingrid Bonilla MD [address] [facility]     XR chest pa & lateral    Result Date: 6/23/2021  Narrative: CHEST INDICATION:   R05: Cough COMPARISON: Chest radiograph 2/20/2020, CTA of the neck from 6/9/2021 which includes the upper chest, chest CT from 6/8/2021  EXAM PERFORMED/VIEWS:  XR CHEST PA & LATERAL  DUAL ENERGY SUBTRACTION   FINDINGS: Cardiomediastinal silhouette normal  Patchy consolidation in the left lung  No effusion or pneumothorax  Osseous structures normal for age  Impression: Patchy consolidation in the left lung suspicious for pneumonia  The study was marked in [facility] for immediate notification  Workstation performed: [id number]     MRI brain wo contrast    Result Date: 6/11/2021  Narrative: MRI BRAIN WITHOUT CONTRAST INDICATION: concern for stroke  COMPARISON:   CTA head and neck 6/9/2021 TECHNIQUE:  Sagittal T1, axial T2, axial FLAIR, axial T1, axial Irwin and axial diffusion imaging  IMAGE QUALITY:  Diagnostic  FINDINGS: BRAIN PARENCHYMA:  There is no discrete mass, mass effect or midline shift  There is no intracranial hemorrhage  There is no evidence of acute infarction and diffusion imaging is unremarkable  Nonspecific foci of T2/FLAIR hyperintensities involving periventricular and subcortical white matter most consistent with mild microangiopathic changes  There are scattered foci of gradient blooming signal in bilateral cerebral hemispheres  There is a 15 mm heavily calcified extra-axial lesion overlying parasagittal left frontal lobe most consistent with heavily calcified meningioma  VENTRICLES:  Normal for the patient's age  SELLA AND PITUITARY GLAND:  Normal  ORBITS:  Normal  PARANASAL SINUSES:  Normal  VASCULATURE:  Evaluation of the major intracranial vasculature demonstrates appropriate flow voids  CALVARIUM AND SKULL BASE:  Normal  EXTRACRANIAL SOFT TISSUES:  Normal      Impression: 1  No acute ischemic disease  2   Nonspecific periventricular and subcortical white matter FLAIR hyperintense foci favoring precocious microangiopathy given underlying risk factors  Post infectious, inflammatory, or demyelinating etiologies are less favored differential considerations   3   Numerous foci of gradient susceptibility blooming signal involving bilateral cerebral hemispheres likely representing foci of chronic microhemorrhage which can be seen in cerebral amyloid angiopathy  4   Parasagittal left frontal lobe 15 mm meningioma  Workstation performed: [id number]     CT stroke alert brain    Result Date: 6/9/2021  Narrative: CT BRAIN - STROKE ALERT PROTOCOL INDICATION:   Stroke, follow up Neuro deficit, acute, stroke suspected stroke alert  COMPARISON:  CT 8/14/2020 TECHNIQUE:  CT examination of the brain was performed  In addition to axial images, coronal reformatted images were created and submitted for interpretation  Radiation dose length product (DLP) for this visit:  891 93 mGy-cm   This examination, like all CT scans performed in the [facility], was performed utilizing techniques to minimize radiation dose exposure, including the use of iterative  reconstruction and automated exposure control  IMAGE QUALITY:  Diagnostic  FINDINGS:  PARENCHYMA:  No intracranial mass, mass effect or midline shift  No CT signs of acute infarction  No acute parenchymal hemorrhage  Stable paramedian vertex left frontal calcification, approximately 1 4 cm thought to represent meningioma  No significant mass effect  Minor degree of what likely represents chronic small vessel disease  Moderate, precocious vascular calcifications  VENTRICLES AND EXTRA-AXIAL SPACES:  Normal for patient's age  VISUALIZED ORBITS AND PARANASAL SINUSES:  Bilateral lens implants CALVARIUM AND EXTRACRANIAL SOFT TISSUES:   Normal      Impression: No acute intracranial abnormality  1 4 cm paramedian left frontal vertex meningioma  Findings were directly discussed with Indra Fonseca on 6/9/2021 12:48 PM  Workstation performed: [id number]     CTA ED chest PE Study    Result Date: 6/23/2021  Narrative: CTA - CHEST WITH IV CONTRAST - PULMONARY ANGIOGRAM INDICATION:   PE suspected, intermediate prob, positive D-dimer r/o PE  COMPARISON: None   TECHNIQUE: CTA examination of the chest was performed using angiographic technique according to a protocol specifically tailored to evaluate for pulmonary embolism  Axial, sagittal, and coronal 2D reformatted images were created from the source data and  submitted for interpretation  In addition, coronal 3D MIP postprocessing was performed on the acquisition scanner  Radiation dose length product (DLP) for this visit:  509 46 mGy-cm   This examination, like all CT scans performed in the [facility], was performed utilizing techniques to minimize radiation dose exposure, including the use of iterative  reconstruction and automated exposure control  IV Contrast:  85 mL of iohexol (OMNIPAQUE)  FINDINGS: PULMONARY ARTERIAL TREE:  No pulmonary embolus is seen although the segmental and subsegmental branches are inadequately evaluated due to contrast timing bolus  LUNGS:  Consolidation in the left upper lobe anteriorly and posterior medially could relate to pneumonia /infection  Posttreatment follow-up with unenhanced chest CT recommended in 3 months to exclude other etiologies  PLEURA:  Unremarkable  HEART/GREAT VESSELS:  Top normal heart size/borderline cardiac megaly  MEDIASTINUM AND MIKY:  Unremarkable  CHEST WALL AND LOWER NECK:   Unremarkable  VISUALIZED STRUCTURES IN THE UPPER ABDOMEN:  Unremarkable  OSSEOUS STRUCTURES:  No acute fracture or destructive osseous lesion  Impression: No evidence of pulmonary embolism given limitations  Consolidation in the left upper lobe anteriorly and posterior medially could relate to pneumonia /infection  Posttreatment follow-up with unenhanced chest CT recommended in 3 months to exclude other etiologies  The study was marked in EPIC for significant notification  Workstation performed: [id number]     CTA ED chest PE Study    Result Date: 6/8/2021  Narrative: CTA - CHEST WITH IV CONTRAST - PULMONARY ANGIOGRAM INDICATION:   pain with deep breathing and SOB   COMPARISON: Multiple priors most recently CT of the chest 2/20/2020 TECHNIQUE: CTA examination of the chest was performed using angiographic technique according to a protocol specifically tailored to evaluate for pulmonary embolism  Axial, sagittal, and coronal 2D reformatted images were created from the source data and  submitted for interpretation  In addition, coronal 3D MIP postprocessing was performed on the acquisition scanner  Radiation dose length product (DLP) for this visit:  1662 1 mGy-cm   This examination, like all CT scans performed in the [facility], was performed utilizing techniques to minimize radiation dose exposure, including the use of iterative  reconstruction and automated exposure control  IV Contrast:  100 mL of iodixanol (VISIPAQUE)  FINDINGS: PULMONARY ARTERIAL TREE:  Evaluation somewhat limited due to respiratory motion  No acute pulmonary embolus identified  LUNGS:  Subsegmental atelectasis at the lung bases  PLEURA:  Pleural calcification along the medial right lung base  No pleural effusion or pneumothorax  HEART/GREAT VESSELS:  Coronary artery atherosclerosis  Minimal atherosclerosis of the aortic arch  Heart is mildly enlarged  MEDIASTINUM AND MIKY:  Unremarkable  CHEST WALL AND LOWER NECK:   Unremarkable  VISUALIZED STRUCTURES IN THE UPPER ABDOMEN:  Unremarkable  OSSEOUS STRUCTURES:  Diffuse groundglass attenuation throughout the bones suggestive of renal osteodystrophy  No acute osseous Arnoldi  Impression: Evaluation of the pulmonary arteries somewhat limited due to respiratory motion  No acute pulmonary embolus identified  No acute abnormality identified  Workstation performed: [id number]     CTA stroke alert (head/neck)    Result Date: 6/9/2021  Narrative: CTA NECK AND BRAIN WITH CONTRAST INDICATION: Neuro deficit, acute, stroke suspected stroke alert COMPARISON:   Contemporary CT brain TECHNIQUE:   Post contrast imaging was performed after administration of iodinated contrast through the neck and brain   Post contrast axial 0 625 mm images timed to opacify the arterial system  3D rendering was performed on an independent workstation  MIP reconstructions performed  Coronal reconstructions were performed of the noncontrast portion of the brain  Radiation dose length product (DLP) for this visit:  621 28 mGy-cm   This examination, like all CT scans performed in the [facility], was performed utilizing techniques to minimize radiation dose exposure, including the use of iterative  reconstruction and automated exposure control  IV Contrast:  100 mL of iohexol (OMNIPAQUE)  IMAGE QUALITY:   Study degraded by body habitus FINDINGS: CERVICAL VASCULATURE AORTIC ARCH AND GREAT VESSELS:  Normal aortic arch and great vessel origins  Normal visualized subclavian vessels  RIGHT VERTEBRAL ARTERY CERVICAL SEGMENT:  Normal origin  The vessel is normal in caliber throughout the neck  LEFT VERTEBRAL ARTERY CERVICAL SEGMENT:  Independent arch origin  The vessel is normal in caliber throughout the neck  RIGHT EXTRACRANIAL CAROTID SEGMENT:  Normal caliber common carotid artery  Normal bifurcation and cervical internal carotid artery  No stenosis or dissection  LEFT EXTRACRANIAL CAROTID SEGMENT:  Normal caliber common carotid artery  Normal bifurcation and cervical internal carotid artery  No stenosis or dissection  Redundancy of the high cervical ICA  NASCET criteria was used to determine the degree of internal carotid artery diameter stenosis  INTRACRANIAL VASCULATURE INTERNAL CAROTID ARTERIES:  Normal enhancement of the intracranial portions of the internal carotid arteries  Normal ophthalmic artery origins  Normal ICA terminus  ANTERIOR CIRCULATION:  Symmetric A1 segments and anterior cerebral arteries with normal enhancement  Normal anterior communicating artery  MIDDLE CEREBRAL ARTERY CIRCULATION:  M1 segment and middle cerebral artery branches demonstrate normal enhancement bilaterally  DISTAL VERTEBRAL ARTERIES:  Normal distal vertebral arteries    Posterior inferior cerebellar artery origins are normal  Normal vertebral basilar junction  BASILAR ARTERY:  Basilar artery is normal in caliber  Normal superior cerebellar arteries  POSTERIOR CEREBRAL ARTERIES: Both posterior cerebral arteries arises from the basilar tip  Both arteries demonstrate normal enhancement  Normal posterior communicating arteries  DURAL VENOUS SINUSES:  Normal  NON VASCULAR ANATOMY BONY STRUCTURES:  No acute osseous abnormality  SOFT TISSUES OF THE NECK:  Unremarkable  THORACIC INLET:  Unremarkable  Impression: Study degraded by body habitus  No large vessel flow restrictive disease within the head or neck  14 mm left frontal parasagittal meningioma  Findings were directly discussed with Russell Calderon on 6/9/2021 12:48 PM  Workstation performed: [id number]         ** Please Note: Dragon 360 Dictation voice to text software was used in the creation of this document   **

## 2021-06-24 NOTE — RESPIRATORY THERAPY NOTE
resp care      06/24/21 1335   Respiratory Protocol   Protocol Initiated? Yes   Protocol Selection Airway Clearance   Language Barrier? No   Medical & Social History Reviewed? Yes   Diagnostic Studies Reviewed? Yes   Physical Assessment Performed? Yes   Airway Clearance Plan Flutter   Respiratory Assessment   Assessment Type Assess only   General Appearance Alert; Awake   Respiratory Pattern Normal   Chest Assessment Chest expansion symmetrical   Bilateral Breath Sounds Clear;Diminished   Cough Strong;Productive   Resp Comments instructions given on the flutter valve, pt does very well, will continue to monitor per protocol   O2 Device na

## 2021-06-24 NOTE — CASE MANAGEMENT
LOS: Day 1  Bundle: pt is not a bundle  Readmission risk: pt is not a 30 day readmit    CM reviewed role with pt's SO Surjit over the phone at [phone]  Surjit reported that he and the pt live together in a 2 story home with 15 BARBARA and another 15 steps to the 2nd floor  Surjit reported that pt was IPTA with ADLs, Robert Powell reported that the pt does not drive, Robert Powell drives and does the shopping  Pt does not work  Surjit confirmed hx of VNA 1 year ago, could not recall the name of the agency  No reported hx of STR, Surjit confirmed hx of OPPT with [facility] on My[employer]  PCP is Dr Sandee Gomez; pharmacy of choice is [facility] on [address]  No reported hx of MH or D&A  No LW/POA on file  Pt receives dialysis from [facility] on [address] Tues/Thurs/Sat from 6am-11am, Robert Powell reported those dates were scheduled to be changed in the future  CM reviewed d/c planning process including the following: identifying help at home, patient preference for d/c planning needs, Discharge Lounge, [facility] Meds to Bed program, availability of treatment team to discuss questions or concerns patient and/or family may have regarding understanding medications and recognizing signs and symptoms once discharged  CM also encouraged patient to follow up with all recommended appointments after discharge  Patient advised of importance for patient and family to participate in managing patients medical well being  Patient/caregiver received discharge checklist  Content reviewed  Patient/caregiver encouraged to participate in discharge plan of care prior to discharge home

## 2021-06-24 NOTE — ASSESSMENT & PLAN NOTE
Lab Results   Component Value Date    EGFR 5 06/24/2021    EGFR 6 06/23/2021    EGFR 7 06/10/2021    CREATININE 8 87 (H) 06/24/2021    CREATININE 7 47 (H) 06/23/2021    CREATININE 6 39 (H) 06/10/2021   · On hemodialysis Monday Wednesdays Fridays  · Nephrology consulted and appreciate their input

## 2021-06-24 NOTE — ASSESSMENT & PLAN NOTE
Patient was started on cefepime with doxycycline  Continue with cefepime for now  MRSA nasal screen  Blood culture and pneumonia studies per protocol  Respiratory support and supplementation with oxygen as needed  Case management  Follow CBC with metabolic profile

## 2021-06-24 NOTE — PROGRESS NOTES
NEPHROLOGY PROGRESS NOTE   Nelly Monahan 49 y o  female MRN: [id number]  Unit/Bed#: -01 Encounter: [id number]  Reason for Consult: ESRD    ASSESSMENT AND PLAN:  Patient is 57-year-old female with significant medical issues of ESRD on HD on TTS schedule, hypertension, history of PE, diabetes, presented with cough, sputum production and abnormal chest x-ray  We are consulted for dialysis management      ESRD on HD on TTS schedule [address]  -Hd today    -outpatient dry weight 124 1 kg   UF removal up to 1 L as BP tolerated  Pre HD weight 124 5 kg today  I saw and examined patient during hemodialysis treatment at 8:56 AM on 6/24/2021  The patient was receiving hemodialysis for treatment of end stage renal disease  I also reviewed vital signs, intake and output, lab results and recent events, and agree with dialysis order  Tolerating HD  BP acceptable     Access, upper extremity AV fistula    CKD anemia, hemoglobin slightly below goal, continue to monitor, not on Epogen due to history of PE  Transfuse p r n  For hemoglobin less than seven      CKD BMD, continue to monitor phosphorus, PTH  Phosphorus level at goal   Secondary hyperparathyroidism, on Hectorol and Sensipar as below  Continue same    Hypertension, blood pressure overall fluctuating, continue to monitor with UF removal on dialysis today      Left lung pneumonia, antibiotic as per primary team    SUBJECTIVE:  Patient seen and examined at bedside  She still has off and on coughing but denies any sputum production overnight  Denies worsening shortness of breath, nausea or vomiting or abdominal pain       OBJECTIVE:  Current Weight: Weight - Scale: 125 kg (275 lb)  Vitals:    06/24/21 0830   BP: (!) 209/82   Pulse: 70   Resp: 18   Temp:    SpO2:        Intake/Output Summary (Last 24 hours) at 6/24/2021 0855  Last data filed at 6/24/2021 0820  Gross per 24 hour   Intake 550 ml   Output --   Net 550 ml     Wt Readings from Last 3 Encounters: 06/24/21 125 kg (275 lb)   06/23/21 125 kg (275 lb)   06/08/21 127 kg (278 lb 15 7 oz)     Temp Readings from Last 3 Encounters:   06/24/21 97 8 °F (36 6 °C) (Oral)   06/23/21 98 6 °F (37 °C) (Tympanic)   06/11/21 98 6 °F (37 °C)     BP Readings from Last 3 Encounters:   06/24/21 (!) 209/82   06/23/21 146/76   06/11/21 150/74     Pulse Readings from Last 3 Encounters:   06/24/21 70   06/23/21 77   06/11/21 72        Physical Examination:  General:  Sitting in chair, no acute distress   Eyes:  Mild conjunctival pallor present  ENT:  External examination of ears and nose unremarkable  Neck:  No Obvious lymphadenopathy appreciated  Respiratory:  Bilateral air entry present  CVS:  S1, S2 present  GI:  Soft, nondistended  CNS:  Active alert oriented x3  Skin:  No new rash in legs  Musculoskeletal:  No obvious new gross deformity noted    Medications:    Current Facility-Administered Medications:     acetaminophen (TYLENOL) tablet 650 mg, 650 mg, Oral, Q6H PRN, Jose Barlow MD    albuterol (PROVENTIL HFA,VENTOLIN HFA) inhaler 2 puff, 2 puff, Inhalation, Q6H PRN, Jose Barlow MD    ALPRAZolam Merlinda Sang) tablet 0 25 mg, 0 25 mg, Oral, BID PRN, Jose Barlow MD, 0 25 mg at 06/24/21 0804    atorvastatin (LIPITOR) tablet 20 mg, 20 mg, Oral, QPM, Jose Barlow MD    atropine (ISOPTO ATROPINE) 1 % ophthalmic solution 1 drop, 1 drop, Both Eyes, HS, Jose Barlow MD    benzonatate (TESSALON PERLES) capsule 100 mg, 100 mg, Oral, TID PRN, Jose Barlow MD    brimonidine (ALPHAGAN P) 0 15 % ophthalmic solution 1 drop, 1 drop, Both Eyes, BID, Jose Barlow MD    calcium carbonate (TUMS) chewable tablet 1,000 mg, 1,000 mg, Oral, Daily, Jose Barlow MD    carvedilol (COREG) tablet 25 mg, 25 mg, Oral, BID With Meals, Jose Barlow MD    cefepime (MAXIPIME) 1,000 mg in dextrose 5 % 50 mL IVPB, 1,000 mg, Intravenous, Q24H, Jose Barlow MD    cinacalcet (SENSIPAR) tablet 30 mg, 30 mg, Oral, Daily, Eris Canchola MD    Diclofenac Sodium (VOLTAREN) 1 % topical gel 2 g, 2 g, Topical, 4x Daily, Eris Canchola MD    docusate sodium (COLACE) capsule 100 mg, 100 mg, Oral, BID PRN, Eris Canchola MD    doxercalciferol (HECTOROL) injection 2 mcg, 2 mcg, Intravenous, After Dialysis, Eris Canchola MD    gabapentin (NEURONTIN) capsule 300 mg, 300 mg, Oral, HS, Eris Canchola MD    guaiFENesin (MUCINEX) 12 hr tablet 600 mg, 600 mg, Oral, BID, Eris Canchola MD    insulin glargine (LANTUS) subcutaneous injection 20 Units 0 2 mL, 20 Units, Subcutaneous, HS, Eris Canchola MD    insulin lispro (HumaLOG) 100 units/mL subcutaneous injection 1-5 Units, 1-5 Units, Subcutaneous, HS, Eris Canchola MD    insulin lispro (HumaLOG) 100 units/mL subcutaneous injection 2-12 Units, 2-12 Units, Subcutaneous, TID AC **AND** Fingerstick Glucose (POCT), , , TID AC, Eris Canchola MD    levothyroxine tablet 50 mcg, 50 mcg, Oral, Early Morning, Eris Canchola MD, 50 mcg at 06/24/21 0539    lidocaine (LIDODERM) 5 % patch 1 patch, 1 patch, Topical, Daily, Eris Canchola MD    loratadine (CLARITIN) tablet 10 mg, 10 mg, Oral, Daily, Eris Canchola MD    multi-electrolyte (PLASMALYTE-A/ISOLYTE-S PH 7 4) IV solution, 125 mL/hr, Intravenous, Continuous, Eris Canchola MD, Last Rate: 125 mL/hr at 06/24/21 0636, 125 mL/hr at 06/24/21 0636    ondansetron (ZOFRAN) injection 4 mg, 4 mg, Intravenous, Q6H PRN, Eris Canchola MD    pantoprazole (PROTONIX) EC tablet 40 mg, 40 mg, Oral, Early Morning, Eris Canchola MD, 40 mg at 06/24/21 0539    sertraline (ZOLOFT) tablet 50 mg, 50 mg, Oral, Daily, Eris Canchola MD    sevelamer (RENAGEL) tablet 800 mg, 800 mg, Oral, TID With Meals, Eris Canchola MD    sodium chloride (OCEAN) 0 65 % nasal spray 1 spray, 1 spray, Each Nare, Q1H PRN, Eris Canchola MD    torsemide [facility]) tablet 100 mg, 100 mg, Oral, Q12H, Eris Canchola MD    warfarin (COUMADIN) tablet 9 mg, 9 mg, Oral, Daily (warfarin), Rocío Cary MD    Laboratory Results:  Results from last 7 days   Lab Units 06/24/21  0457 06/24/21  0456 06/23/21  1310 06/23/21  1253   WBC Thousand/uL 8 59  --  10 44*  --    HEMOGLOBIN g/dL 8 4*  --  8 9*  --    HEMATOCRIT % 25 7*  --  27 4*  --    PLATELETS Thousands/uL 198  --  214  --    SODIUM mmol/L  --  129*  --  134*   POTASSIUM mmol/L  --  5 0  --  4 8   CHLORIDE mmol/L  --  94*  --  97*   CO2 mmol/L  --  25  --  27   BUN mg/dL  --  66*  --  47*   CREATININE mg/dL  --  8 87*  --  7 47*   CALCIUM mg/dL  --  7 7*  --  8 8   MAGNESIUM mg/dL  --  1 9  --   --    PHOSPHORUS mg/dL  --  4 5  --   --        CTA ED chest PE Study   Final Result by Mal Butts MD (06/23 2102)      No evidence of pulmonary embolism given limitations  Consolidation in the left upper lobe anteriorly and posterior medially could relate to pneumonia /infection  Posttreatment follow-up with unenhanced chest CT recommended in 3 months to exclude other etiologies  The study was marked in EPIC for significant notification  Workstation performed: [id number]             Portions of the record may have been created with voice recognition software  Occasional wrong word or "sound a like" substitutions may have occurred due to the inherent limitations of voice recognition software  Read the chart carefully and recognize, using context, where substitutions have occurred

## 2021-06-24 NOTE — ASSESSMENT & PLAN NOTE
· Suspect gram-negative bacterial pneumonia in setting of recent hospitalization  · Continue IV cefepime  · Procalcitonin is slightly elevated @ 0 27    Repeat in AM   · Urine strep and legionella: pending  · MRSA nasal screen: pending

## 2021-06-24 NOTE — ED ATTENDING ATTESTATION
6/23/2021  Koby Hunter DO, saw and evaluated the patient  I have discussed the patient with the resident/non-physician practitioner and agree with the resident's/non-physician practitioner's findings, Plan of Care, and MDM as documented in the resident's/non-physician practitioner's note, except where noted  All available labs and Radiology studies were reviewed  I was present for key portions of any procedure(s) performed by the resident/non-physician practitioner and I was immediately available to provide assistance  At this point I agree with the current assessment done in the Emergency Department  I have conducted an independent evaluation of this patient a history and physical is as follows:      49 yof with pleuritic chest pain, cough and concern for PNA based on recent CXR and recent hospitalization  Pt uncomfortable, A&Ox4, CTA, RRR, abd soft/NT, ext NROM    Presentation concerning for PE vs HCAP    ddimer elevated and CTA revealed more detail on the possible infiltrate seen on CXR  IV AB, BCX, admit       ED Course         Critical Care Time  CriticalCare Time  Performed by: Flako Carias DO  Authorized by: Flako Carias DO     Critical care provider statement:     Critical care time (minutes):  38    Critical care time was exclusive of:  Separately billable procedures and treating other patients and teaching time    Critical care was necessary to treat or prevent imminent or life-threatening deterioration of the following conditions:  Sepsis    Critical care was time spent personally by me on the following activities:  Obtaining history from patient or surrogate, development of treatment plan with patient or surrogate, evaluation of patient's response to treatment, examination of patient, review of old charts, re-evaluation of patient's condition, ordering and review of radiographic studies, ordering and review of laboratory studies and ordering and performing treatments and interventions

## 2021-06-24 NOTE — ASSESSMENT & PLAN NOTE
Lab Results   Component Value Date    EGFR 6 06/23/2021    EGFR 7 06/10/2021    EGFR 10 06/09/2021    CREATININE 7 47 (H) 06/23/2021    CREATININE 6 39 (H) 06/10/2021    CREATININE 4 57 (H) 06/09/2021   On hemodialysis Monday Wednesdays Fridays  Nephrology consult

## 2021-06-24 NOTE — PLAN OF CARE
Post-Dialysis RN Treatment Note    Blood Pressure:  Pre: 138/63 mm/Hg  Post: 122/86 mmHg   EDW: 124 5 kg    Weight:  Pre: 124 5 kg   Post: 123 6 kg   Mode of weight measurement: Bed Scale   Volume Removed: 900 ml    Treatment duration: 240 minutes    NS given  No    Treatment shortened?  No   Medications given during Rx: Hectorol and Tylenol   Estimated Kt/V  Not Applicable   Access type: AV fistula   Access Status: Yes, describe: Ordered BFR maintained     Report called to primary nurse   Vinny, Lyndsey    Problem: METABOLIC, FLUID AND ELECTROLYTES - ADULT  Goal: Electrolytes maintained within normal limits  Description: INTERVENTIONS:  - Monitor labs and assess patient for signs and symptoms of electrolyte imbalances  - Administer electrolyte replacement as ordered  - Monitor response to electrolyte replacements, including repeat lab results as appropriate  - Instruct patient on fluid and nutrition as appropriate  Outcome: Progressing  Goal: Fluid balance maintained  Description: INTERVENTIONS:  - Monitor labs   - Monitor I/O and WT  - Instruct patient on fluid and nutrition as appropriate  - Assess for signs & symptoms of volume excess or deficit  Outcome: Progressing     Problem: HEMATOLOGIC - ADULT  Goal: Maintains hematologic stability  Description: INTERVENTIONS  - Assess for signs and symptoms of bleeding or hemorrhage  - Monitor labs  - Administer supportive blood products/factors as ordered and appropriate  Outcome: Progressing

## 2021-06-24 NOTE — QUICK NOTE
Blood sugar 560 - dd not get Lantus 20 units last night  Will:  Give Lantus 10 units this AM   Pegular insulin 10 units IV x1  Isolyte 125ml/hour  Recheck in two hours (8AM) and will see next interventions

## 2021-06-25 LAB
ATRIAL RATE: 76 BPM
GLUCOSE SERPL-MCNC: 167 MG/DL (ref 65–140)
GLUCOSE SERPL-MCNC: 190 MG/DL (ref 65–140)
GLUCOSE SERPL-MCNC: 191 MG/DL (ref 65–140)
GLUCOSE SERPL-MCNC: 238 MG/DL (ref 65–140)
GLUCOSE SERPL-MCNC: 353 MG/DL (ref 65–140)
P AXIS: 21 DEGREES
PR INTERVAL: 160 MS
PROCALCITONIN SERPL-MCNC: 0.26 NG/ML
QRS AXIS: 22 DEGREES
QRSD INTERVAL: 98 MS
QT INTERVAL: 382 MS
QTC INTERVAL: 429 MS
T WAVE AXIS: 66 DEGREES
VENTRICULAR RATE: 76 BPM

## 2021-06-25 PROCEDURE — 99232 SBSQ HOSP IP/OBS MODERATE 35: CPT | Performed by: INTERNAL MEDICINE

## 2021-06-25 PROCEDURE — 94668 MNPJ CHEST WALL SBSQ: CPT

## 2021-06-25 PROCEDURE — 82948 REAGENT STRIP/BLOOD GLUCOSE: CPT

## 2021-06-25 PROCEDURE — 99232 SBSQ HOSP IP/OBS MODERATE 35: CPT | Performed by: PHYSICIAN ASSISTANT

## 2021-06-25 PROCEDURE — 84145 PROCALCITONIN (PCT): CPT | Performed by: INTERNAL MEDICINE

## 2021-06-25 PROCEDURE — 93010 ELECTROCARDIOGRAM REPORT: CPT | Performed by: INTERNAL MEDICINE

## 2021-06-25 RX ORDER — NIFEDIPINE 30 MG/1
30 TABLET, EXTENDED RELEASE ORAL DAILY
Status: DISCONTINUED | OUTPATIENT
Start: 2021-06-25 | End: 2021-06-26 | Stop reason: HOSPADM

## 2021-06-25 RX ORDER — LANOLIN ALCOHOL/MO/W.PET/CERES
3 CREAM (GRAM) TOPICAL
Status: DISCONTINUED | OUTPATIENT
Start: 2021-06-25 | End: 2021-06-26 | Stop reason: HOSPADM

## 2021-06-25 RX ADMIN — CARVEDILOL 25 MG: 25 TABLET, FILM COATED ORAL at 07:42

## 2021-06-25 RX ADMIN — DICLOFENAC SODIUM 2 G: 10 GEL TOPICAL at 08:18

## 2021-06-25 RX ADMIN — TORSEMIDE 100 MG: 20 TABLET ORAL at 08:14

## 2021-06-25 RX ADMIN — MELATONIN 3 MG: at 22:41

## 2021-06-25 RX ADMIN — LORATADINE 10 MG: 10 TABLET ORAL at 08:15

## 2021-06-25 RX ADMIN — ATROPINE SULFATE 1 DROP: 10 SOLUTION/ DROPS OPHTHALMIC at 22:27

## 2021-06-25 RX ADMIN — SEVELAMER HYDROCHLORIDE 800 MG: 800 TABLET, FILM COATED PARENTERAL at 11:28

## 2021-06-25 RX ADMIN — GABAPENTIN 300 MG: 300 CAPSULE ORAL at 22:25

## 2021-06-25 RX ADMIN — INSULIN LISPRO 2 UNITS: 100 INJECTION, SOLUTION INTRAVENOUS; SUBCUTANEOUS at 08:10

## 2021-06-25 RX ADMIN — ATORVASTATIN CALCIUM 20 MG: 20 TABLET, FILM COATED ORAL at 17:31

## 2021-06-25 RX ADMIN — ANTACID TABLETS 1000 MG: 500 TABLET, CHEWABLE ORAL at 08:12

## 2021-06-25 RX ADMIN — GUAIFENESIN 600 MG: 600 TABLET, EXTENDED RELEASE ORAL at 08:15

## 2021-06-25 RX ADMIN — CINACALCET 30 MG: 30 TABLET ORAL at 08:16

## 2021-06-25 RX ADMIN — INSULIN LISPRO 3 UNITS: 100 INJECTION, SOLUTION INTRAVENOUS; SUBCUTANEOUS at 22:25

## 2021-06-25 RX ADMIN — PANTOPRAZOLE SODIUM 40 MG: 40 TABLET, DELAYED RELEASE ORAL at 05:06

## 2021-06-25 RX ADMIN — CARVEDILOL 25 MG: 25 TABLET, FILM COATED ORAL at 16:51

## 2021-06-25 RX ADMIN — ONDANSETRON 4 MG: 2 INJECTION INTRAMUSCULAR; INTRAVENOUS at 13:58

## 2021-06-25 RX ADMIN — BRIMONIDINE TARTRATE 1 DROP: 1.5 SOLUTION OPHTHALMIC at 17:28

## 2021-06-25 RX ADMIN — WARFARIN SODIUM 9 MG: 3 TABLET ORAL at 17:31

## 2021-06-25 RX ADMIN — SEVELAMER HYDROCHLORIDE 800 MG: 800 TABLET, FILM COATED PARENTERAL at 16:51

## 2021-06-25 RX ADMIN — DICLOFENAC SODIUM 2 G: 10 GEL TOPICAL at 22:25

## 2021-06-25 RX ADMIN — INSULIN LISPRO 2 UNITS: 100 INJECTION, SOLUTION INTRAVENOUS; SUBCUTANEOUS at 11:14

## 2021-06-25 RX ADMIN — CEFEPIME HYDROCHLORIDE 1000 MG: 1 INJECTION, POWDER, FOR SOLUTION INTRAMUSCULAR; INTRAVENOUS at 22:25

## 2021-06-25 RX ADMIN — SEVELAMER HYDROCHLORIDE 800 MG: 800 TABLET, FILM COATED PARENTERAL at 07:42

## 2021-06-25 RX ADMIN — INSULIN GLARGINE 20 UNITS: 100 INJECTION, SOLUTION SUBCUTANEOUS at 22:26

## 2021-06-25 RX ADMIN — GUAIFENESIN 600 MG: 600 TABLET, EXTENDED RELEASE ORAL at 17:31

## 2021-06-25 RX ADMIN — INSULIN LISPRO 2 UNITS: 100 INJECTION, SOLUTION INTRAVENOUS; SUBCUTANEOUS at 16:50

## 2021-06-25 RX ADMIN — BRIMONIDINE TARTRATE 1 DROP: 1.5 SOLUTION OPHTHALMIC at 08:13

## 2021-06-25 RX ADMIN — TORSEMIDE 100 MG: 20 TABLET ORAL at 22:26

## 2021-06-25 RX ADMIN — LEVOTHYROXINE SODIUM 50 MCG: 50 TABLET ORAL at 05:06

## 2021-06-25 RX ADMIN — NIFEDIPINE 30 MG: 30 TABLET, FILM COATED, EXTENDED RELEASE ORAL at 13:57

## 2021-06-25 RX ADMIN — SERTRALINE HYDROCHLORIDE 50 MG: 50 TABLET ORAL at 08:15

## 2021-06-25 NOTE — ASSESSMENT & PLAN NOTE
Lab Results   Component Value Date    EGFR 5 06/24/2021    EGFR 6 06/23/2021    EGFR 7 06/10/2021    CREATININE 8 87 (H) 06/24/2021    CREATININE 7 47 (H) 06/23/2021    CREATININE 6 39 (H) 06/10/2021   · On hemodialysis Tuesday, Thursday, Saturday  · Nephrology consulted and appreciate their input

## 2021-06-25 NOTE — PROGRESS NOTES
[facility]  Progress Note Marce Strong 1965, 49 y o  female MRN: [id number]  Unit/Bed#: -01 Encounter: [id number]  Primary Care Provider: Michelle Michael MD   Date and time admitted to hospital: 6/23/2021 12:10 PM    * Healthcare-associated pneumonia  Assessment & Plan  · Suspect gram-negative bacterial pneumonia in setting of recent hospitalization  · Continue IV cefepime  · Procalcitonin is slightly elevated @ 0 27  Repeat is 0 26, which may be secondary to dialysis  · Urine strep and legionella: pending  · MRSA nasal screen: positive    Type 2 diabetes mellitus [facility])  Assessment & Plan  Lab Results   Component Value Date    HGBA1C 8 4 (H) 06/11/2021     Recent Labs     06/24/21  2107 06/25/21  0103 06/25/21  0603 06/25/21  1048   POCGLU 169* 238* 191* 167*     Blood Sugar Average: Last 72 hrs:  (P) 235     · Continue glargine insulin 20 units at night  · NovoLog/Humalog sliding scale  Accu-Cheks per protocol  Anxiety disorder  Assessment & Plan  · Continue Xanax 0 25 mg b i d  p r n   · Zoloft 50 mg daily  ESRD on hemodialysis [facility])  Assessment & Plan  Lab Results   Component Value Date    EGFR 5 06/24/2021    EGFR 6 06/23/2021    EGFR 7 06/10/2021    CREATININE 8 87 (H) 06/24/2021    CREATININE 7 47 (H) 06/23/2021    CREATININE 6 39 (H) 06/10/2021   · On hemodialysis Tuesday, Thursday, Saturday  · Nephrology consulted and appreciate their input  History of DVT (deep vein thrombosis)  Assessment & Plan  · Currently on warfarin at 9 mg daily Monday-Saturday and 12 mg on Sunday  · INR: 1 92    Essential hypertension  Assessment & Plan  · Continue Coreg 25 mg BID  · Also on torsemide 100 mg BID  · BPs are volatile  Renal following and appreciate their input  VTE Pharmacologic Prophylaxis:   Coumadin    Patient Centered Rounds: I performed bedside rounds with nursing staff today    Discussions with Specialists or Other Care Team Provider: None    Education and Discussions with Family / Patient: Patient declined call to   Time Spent for Care: 30 minutes  More than 50% of total time spent on counseling and coordination of care as described above  Current Length of Stay: 2 day(s)  Current Patient Status: Inpatient   Certification Statement: The patient will continue to require additional inpatient hospital stay due to continued need for IV antibiotics  Discharge Plan: Anticipate discharge tomorrow to home  Code Status: Level 1 - Full Code    Subjective:   Patient is feeling better today stating her cough is now dry and her shortness of breath has improved  She still does not quite feel back to baseline yet  Objective:     Vitals:   Temp (24hrs), Av 8 °F (37 1 °C), Min:98 5 °F (36 9 °C), Max:99 °F (37 2 °C)    Temp:  [98 5 °F (36 9 °C)-99 °F (37 2 °C)] 99 °F (37 2 °C)  HR:  [77] 77  BP: (171-179)/(68-69) 173/69  SpO2:  [97 %] 97 %  Body mass index is 44 39 kg/m²  Input and Output Summary (last 24 hours): Intake/Output Summary (Last 24 hours) at 2021 1303  Last data filed at 2021 0800  Gross per 24 hour   Intake 420 ml   Output --   Net 420 ml       Physical Exam:   Physical Exam  Vitals and nursing note reviewed  Constitutional:       General: She is not in acute distress  Appearance: She is well-developed  HENT:      Head: Normocephalic and atraumatic  Eyes:      Conjunctiva/sclera: Conjunctivae normal    Cardiovascular:      Rate and Rhythm: Normal rate and regular rhythm  Heart sounds: No murmur heard  Pulmonary:      Effort: Pulmonary effort is normal  No respiratory distress  Breath sounds: Normal breath sounds  Abdominal:      Palpations: Abdomen is soft  Tenderness: There is no abdominal tenderness  Musculoskeletal:      Cervical back: Neck supple  Skin:     General: Skin is warm and dry  Neurological:      Mental Status: She is alert          Additional Data: Labs:  Results from last 7 days   Lab Units 06/24/21  0457   WBC Thousand/uL 8 59   HEMOGLOBIN g/dL 8 4*   HEMATOCRIT % 25 7*   PLATELETS Thousands/uL 198   NEUTROS PCT % 87*   LYMPHS PCT % 6*   MONOS PCT % 6   EOS PCT % 0     Results from last 7 days   Lab Units 06/24/21  0456   SODIUM mmol/L 129*   POTASSIUM mmol/L 5 0   CHLORIDE mmol/L 94*   CO2 mmol/L 25   BUN mg/dL 66*   CREATININE mg/dL 8 87*   ANION GAP mmol/L 10   CALCIUM mg/dL 7 7*   ALBUMIN g/dL 2 6*   TOTAL BILIRUBIN mg/dL 0 38   ALK PHOS U/L 153*   ALT U/L 19   AST U/L 10   GLUCOSE RANDOM mg/dL 560*     Results from last 7 days   Lab Units 06/24/21  0456   INR  1 92*     Results from last 7 days   Lab Units 06/25/21  1048 06/25/21  0603 06/25/21  0103 06/24/21  2107 06/24/21  1619 06/24/21  1326 06/24/21  0756   POC GLUCOSE mg/dl 167* 191* 238* 169* 258* 138 484*         Results from last 7 days   Lab Units 06/25/21  0514 06/24/21  0456   PROCALCITONIN ng/ml 0 26* 0 27*       Lines/Drains:  Invasive Devices     Peripheral Intravenous Line            Peripheral IV 06/24/21 Dorsal (posterior); Right Forearm <1 day          Line            Hemodialysis AV Fistula 02/20/18 Left Forearm 1220 days                      Imaging: Reviewed radiology reports from this admission including: chest CT scan    Recent Cultures (last 7 days):         Last 24 Hours Medication List:   Current Facility-Administered Medications   Medication Dose Route Frequency Provider Last Rate    acetaminophen  650 mg Oral Q6H PRN Davide Rosales MD      albuterol  2 puff Inhalation Q6H PRN Davide Rosales MD      ALPRAZolam  0 25 mg Oral BID PRN Davide Rosales MD      atorvastatin  20 mg Oral QPM Davide Rosales MD      atropine  1 drop Both Eyes HS Davide Rosales MD      benzonatate  100 mg Oral TID PRN Davide Rosales MD      brimonidine  1 drop Both Eyes BID Davide Rosales MD      calcium carbonate  1,000 mg Oral Daily Davide Rosales MD      carvedilol  25 mg Oral BID With Meals Damion Alvarez MD      cefepime  1,000 mg Intravenous Q24H Damion Alvarez MD 1,000 mg (06/24/21 7826)    cinacalcet  30 mg Oral Daily Damion Alvarez MD      Diclofenac Sodium  2 g Topical 4x Daily Damion Alvarez MD      docusate sodium  100 mg Oral BID PRN Damion Alvarez MD      doxercalciferol  2 mcg Intravenous After Dialysis Damion Alvarez MD      gabapentin  300 mg Oral HS Damion Alvarez MD      guaiFENesin  600 mg Oral BID Damion Alvarez MD      insulin glargine  20 Units Subcutaneous HS Damion Alvarez MD      insulin lispro  1-5 Units Subcutaneous HS Damion Alvarez MD      insulin lispro  2-12 Units Subcutaneous TID [facility] Damion Alvarez MD      levothyroxine  50 mcg Oral Early Morning Damion Alvarez MD      lidocaine  1 patch Topical Daily Damion Alvarez MD      loratadine  10 mg Oral Daily Damion Alvarez MD      NIFEdipine  30 mg Oral Daily Carolyn Chiu MD      ondansetron  4 mg Intravenous Q6H PRN Damion Alvarez MD      pantoprazole  40 mg Oral Early Morning Damion Alvarez MD      sertraline  50 mg Oral Daily Damion Alvarez MD      sevelamer  800 mg Oral TID With Meals Damion Alvarez MD      sodium chloride  1 spray Each Nare Q1H PRN Damion Alvarez MD      torsemide  100 mg Oral Q12H Damion Alvarez MD      warfarin  9 mg Oral Daily (warfarin) Damion Alvarez MD          Today, Patient Was Seen By: Evelin León PA-C    **Please Note: This note may have been constructed using a voice recognition system  **

## 2021-06-25 NOTE — PLAN OF CARE
Problem: PAIN - ADULT  Goal: Verbalizes/displays adequate comfort level or baseline comfort level  Description: Interventions:  - Encourage patient to monitor pain and request assistance  - Assess pain using appropriate pain scale  - Administer analgesics based on type and severity of pain and evaluate response  - Implement non-pharmacological measures as appropriate and evaluate response  - Consider cultural and social influences on pain and pain management  - Notify physician/advanced practitioner if interventions unsuccessful or patient reports new pain  Outcome: Progressing     Problem: INFECTION - ADULT  Goal: Absence or prevention of progression during hospitalization  Description: INTERVENTIONS:  - Assess and monitor for signs and symptoms of infection  - Monitor lab/diagnostic results  - Monitor all insertion sites, i e  indwelling lines, tubes, and drains  - Monitor endotracheal if appropriate and nasal secretions for changes in amount and color  - [place] appropriate cooling/warming therapies per order  - Administer medications as ordered  - Instruct and encourage patient and family to use good hand hygiene technique  - Identify and instruct in appropriate isolation precautions for identified infection/condition  Outcome: Progressing     Problem: SAFETY ADULT  Goal: Patient will remain free of falls  Description: INTERVENTIONS:  - Educate patient/family on patient safety including physical limitations  - Instruct patient to call for assistance with activity   - Consult OT/PT to assist with strengthening/mobility   - Keep Call bell within reach  - Keep bed low and locked with side rails adjusted as appropriate  - Keep care items and personal belongings within reach  - Initiate and maintain comfort rounds  - Make Fall Risk Sign visible to staff  - Offer Toileting every 2 Hours, in advance of need  - Initiate/Maintain bed alarm  - Apply yellow socks and bracelet for high fall risk patients  - Consider moving patient to room near nurses station  Outcome: Progressing  Goal: Maintain or return to baseline ADL function  Description: INTERVENTIONS:  -  Assess patient's ability to carry out ADLs; assess patient's baseline for ADL function and identify physical deficits which impact ability to perform ADLs (bathing, care of mouth/teeth, toileting, grooming, dressing, etc )  - Assess/evaluate cause of self-care deficits   - Assess range of motion  - Assess patient's mobility; develop plan if impaired  - Assess patient's need for assistive devices and provide as appropriate  - Encourage maximum independence but intervene and supervise when necessary  - Involve family in performance of ADLs  - Assess for home care needs following discharge   - Consider OT consult to assist with ADL evaluation and planning for discharge  - Provide patient education as appropriate  Outcome: Progressing  Goal: Maintains/Returns to pre admission functional level  Description: INTERVENTIONS:  - Perform BMAT or MOVE assessment daily    - Set and communicate daily mobility goal to care team and patient/family/caregiver  - Collaborate with rehabilitation services on mobility goals if consulted  - Perform Range of Motion 2 times a day  - Reposition patient every 2 hours    - Dangle patient 2 times a day  - Stand patient 2 times a day  - Ambulate patient 2 times a day  - Out of bed to chair 2 times a day   - Out of bed for meals 2 times a day  - Out of bed for toileting  - Record patient progress and toleration of activity level   Outcome: Progressing     Problem: DISCHARGE PLANNING  Goal: Discharge to home or other facility with appropriate resources  Description: INTERVENTIONS:  - Identify barriers to discharge w/patient and caregiver  - Arrange for needed discharge resources and transportation as appropriate  - Identify discharge learning needs (meds, wound care, etc )  - Arrange for interpretive services to assist at discharge as needed  - Refer to Case Management Department for coordinating discharge planning if the patient needs post-hospital services based on physician/advanced practitioner order or complex needs related to functional status, cognitive ability, or social support system  Outcome: Progressing     Problem: Knowledge Deficit  Goal: Patient/family/caregiver demonstrates understanding of disease process, treatment plan, medications, and discharge instructions  Description: Complete learning assessment and assess knowledge base    Interventions:  - Provide teaching at level of understanding  - Provide teaching via preferred learning methods  Outcome: Progressing     Problem: METABOLIC, FLUID AND ELECTROLYTES - ADULT  Goal: Electrolytes maintained within normal limits  Description: INTERVENTIONS:  - Monitor labs and assess patient for signs and symptoms of electrolyte imbalances  - Administer electrolyte replacement as ordered  - Monitor response to electrolyte replacements, including repeat lab results as appropriate  - Instruct patient on fluid and nutrition as appropriate  Outcome: Progressing  Goal: Fluid balance maintained  Description: INTERVENTIONS:  - Monitor labs   - Monitor I/O and WT  - Instruct patient on fluid and nutrition as appropriate  - Assess for signs & symptoms of volume excess or deficit  Outcome: Progressing     Problem: HEMATOLOGIC - ADULT  Goal: Maintains hematologic stability  Description: INTERVENTIONS  - Assess for signs and symptoms of bleeding or hemorrhage  - Monitor labs  - Administer supportive blood products/factors as ordered and appropriate  Outcome: Progressing

## 2021-06-25 NOTE — ASSESSMENT & PLAN NOTE
Lab Results   Component Value Date    HGBA1C 8 4 (H) 06/11/2021     Recent Labs     06/24/21  2107 06/25/21  0103 06/25/21  0603 06/25/21  1048   POCGLU 169* 238* 191* 167*     Blood Sugar Average: Last 72 hrs:  (P) 235     · Continue glargine insulin 20 units at night  · NovoLog/Humalog sliding scale  Accu-Cheks per protocol

## 2021-06-25 NOTE — PROGRESS NOTES
NEPHROLOGY PROGRESS NOTE   Junior Restrepo 49 y o  female MRN: [id number]  Unit/Bed#: -01 Encounter: [id number]  Reason for Consult: ESRD    ASSESSMENT AND PLAN:  Patient is 81-year-old female with significant medical issues of ESRD on HD on TTS schedule, hypertension, history of PE, diabetes, presented with cough, sputum production and abnormal chest x-ray  We are consulted for dialysis management      ESRD on HD on TTS schedule [address]  -HD tomorrow    -outpatient dry weight 124 1 kg   post HD weight 123 6 kg yesterday      Access, upper extremity AV fistula     CKD anemia, hemoglobin slightly below goal, continue to monitor, not on Epogen due to history of PE   Transfuse p r n  For hemoglobin less than seven      CKD BMD, continue to monitor phosphorus, PTH  Phosphorus level at goal   Secondary hyperparathyroidism, on Hectorol and Sensipar  Continue same     Hypertension, blood pressure remains above goal  continue to monitor with UF removal  -currently on carvedilol 25 mg b i d , torsemide 100 mg daily  Will start patient on nifedipine XL 30 mg daily      Left lung pneumonia, antibiotic as per primary team    SUBJECTIVE:  Patient seen and examined at bedside  Coughing seems to be slowly improving  Denies any chest pain or worsening shortness of breath  Denies nausea vomiting       OBJECTIVE:  Current Weight: Weight - Scale: 125 kg (275 lb)  Vitals:    06/25/21 0742   BP: (!) 173/69   Pulse:    Resp:    Temp: 99 °F (37 2 °C)   SpO2:        Intake/Output Summary (Last 24 hours) at 6/25/2021 1146  Last data filed at 6/25/2021 0800  Gross per 24 hour   Intake 980 ml   Output 1500 ml   Net -520 ml     Wt Readings from Last 3 Encounters:   06/24/21 125 kg (275 lb)   06/23/21 125 kg (275 lb)   06/08/21 127 kg (278 lb 15 7 oz)     Temp Readings from Last 3 Encounters:   06/25/21 99 °F (37 2 °C)   06/23/21 98 6 °F (37 °C) (Tympanic)   06/11/21 98 6 °F (37 °C)     BP Readings from Last 3 Encounters:   06/25/21 (!) 173/69   06/23/21 146/76   06/11/21 150/74     Pulse Readings from Last 3 Encounters:   06/24/21 77   06/23/21 77   06/11/21 72        Physical Examination:  General:  Lying in bed, no acute distress   Eyes:  Mild conjunctival pallor present  ENT:  External examination of ears and nose unremarkable  Neck:  No obvious lymphadenopathy appreciated  Respiratory:  Bilateral air entry present  CVS:  S1, S2 present  GI:  Soft, nondistended  CNS:  Active alert oriented x3  Skin:  No new rash in legs  Musculoskeletal:  No obvious new gross deformity noted    Medications:    Current Facility-Administered Medications:     acetaminophen (TYLENOL) tablet 650 mg, 650 mg, Oral, Q6H PRN, Lieutenant Eleazar MD, 650 mg at 06/24/21 1150    albuterol (PROVENTIL HFA,VENTOLIN HFA) inhaler 2 puff, 2 puff, Inhalation, Q6H PRN, Lieutenant Eleazar MD    ALPRAZolam [place]) tablet 0 25 mg, 0 25 mg, Oral, BID PRN, Lieutenant Eleazar MD, 0 25 mg at 06/24/21 0804    atorvastatin (LIPITOR) tablet 20 mg, 20 mg, Oral, QPM, Lieutenant Eleazar MD, 20 mg at 06/24/21 1709    atropine (ISOPTO ATROPINE) 1 % ophthalmic solution 1 drop, 1 drop, Both Eyes, HS, Lieutenant Eleazar MD, 1 drop at 06/24/21 2202    benzonatate (TESSALON PERLES) capsule 100 mg, 100 mg, Oral, TID PRN, Lieutenant Eleazar MD    brimonidine (ALPHAGAN P) 0 15 % ophthalmic solution 1 drop, 1 drop, Both Eyes, BID, Lieutenant Eleazar MD, 1 drop at 06/25/21 0813    calcium carbonate (TUMS) chewable tablet 1,000 mg, 1,000 mg, Oral, Daily, Lieutenant Eleazar MD, 1,000 mg at 06/25/21 8006    carvedilol (COREG) tablet 25 mg, 25 mg, Oral, BID With Meals, Lieutenant Eleazar MD, 25 mg at 06/25/21 0742    cefepime (MAXIPIME) 1,000 mg in dextrose 5 % 50 mL IVPB, 1,000 mg, Intravenous, Q24H, Lieutenant Eleazar MD, Last Rate: 100 mL/hr at 06/24/21 2358, 1,000 mg at 06/24/21 2358    cinacalcet (SENSIPAR) tablet 30 mg, 30 mg, Oral, Daily, Lieutenant Eleazar MD, 30 mg at 06/25/21 0816    Diclofenac Sodium (VOLTAREN) 1 % topical gel 2 g, 2 g, Topical, 4x Daily, Rocío Cary MD, 2 g at 06/25/21 0818    docusate sodium (COLACE) capsule 100 mg, 100 mg, Oral, BID PRN, Rocío Cary MD    doxercalciferol (HECTOROL) injection 2 mcg, 2 mcg, Intravenous, After Dialysis, Rocío Cary MD, 2 mcg at 06/24/21 0918    gabapentin (NEURONTIN) capsule 300 mg, 300 mg, Oral, HS, Rocío Cary MD, 300 mg at 06/24/21 2205    guaiFENesin (MUCINEX) 12 hr tablet 600 mg, 600 mg, Oral, BID, Rocío Cary MD, 600 mg at 06/25/21 0815    insulin glargine (LANTUS) subcutaneous injection 20 Units 0 2 mL, 20 Units, Subcutaneous, HS, Rocío Cary MD, 20 Units at 06/24/21 2201    insulin lispro (HumaLOG) 100 units/mL subcutaneous injection 1-5 Units, 1-5 Units, Subcutaneous, HS, Rocío Cary MD, 1 Units at 06/24/21 2202    insulin lispro (HumaLOG) 100 units/mL subcutaneous injection 2-12 Units, 2-12 Units, Subcutaneous, TID AC, 2 Units at 06/25/21 1114 **AND** Fingerstick Glucose (POCT), , , TID AC, Rocío Cary MD    levothyroxine tablet 50 mcg, 50 mcg, Oral, Early Morning, Rocío Cary MD, 50 mcg at 06/25/21 0506    lidocaine (LIDODERM) 5 % patch 1 patch, 1 patch, Topical, Daily, Rocío Cary MD, 1 patch at 06/24/21 1335    loratadine (CLARITIN) tablet 10 mg, 10 mg, Oral, Daily, Rocío Cary MD, 10 mg at 06/25/21 0815    ondansetron (ZOFRAN) injection 4 mg, 4 mg, Intravenous, Q6H PRN, Rocío Cary MD, 4 mg at 06/24/21 1750    pantoprazole (PROTONIX) EC tablet 40 mg, 40 mg, Oral, Early Morning, Rocío Cary MD, 40 mg at 06/25/21 0506    sertraline (ZOLOFT) tablet 50 mg, 50 mg, Oral, Daily, Rocío Cary MD, 50 mg at 06/25/21 0815    sevelamer (RENAGEL) tablet 800 mg, 800 mg, Oral, TID With Meals, Rocío Cary MD, 800 mg at 06/25/21 1128    sodium chloride (OCEAN) 0 65 % nasal spray 1 spray, 1 spray, Each Nare, Q1H PRN, Rocío Cary MD    torsemide (DEMADEX) tablet 100 mg, 100 mg, Oral, Q12H, Susan Ahuja MD, 100 mg at 06/25/21 0814    warfarin (COUMADIN) tablet 9 mg, 9 mg, Oral, Daily (warfarin), Susan Ahuja MD, 9 mg at 06/24/21 1709    Laboratory Results:  Results from last 7 days   Lab Units 06/24/21  0457 06/24/21  0456 06/23/21  1310 06/23/21  1253   WBC Thousand/uL 8 59  --  10 44*  --    HEMOGLOBIN g/dL 8 4*  --  8 9*  --    HEMATOCRIT % 25 7*  --  27 4*  --    PLATELETS Thousands/uL 198  --  214  --    SODIUM mmol/L  --  129*  --  134*   POTASSIUM mmol/L  --  5 0  --  4 8   CHLORIDE mmol/L  --  94*  --  97*   CO2 mmol/L  --  25  --  27   BUN mg/dL  --  66*  --  47*   CREATININE mg/dL  --  8 87*  --  7 47*   CALCIUM mg/dL  --  7 7*  --  8 8   MAGNESIUM mg/dL  --  1 9  --   --    PHOSPHORUS mg/dL  --  4 5  --   --        CTA ED chest PE Study   Final Result by Krystian Wilson MD (06/23 2102)      No evidence of pulmonary embolism given limitations  Consolidation in the left upper lobe anteriorly and posterior medially could relate to pneumonia /infection  Posttreatment follow-up with unenhanced chest CT recommended in 3 months to exclude other etiologies  The study was marked in EPIC for significant notification  Workstation performed: [id number]             Portions of the record may have been created with voice recognition software  Occasional wrong word or "sound a like" substitutions may have occurred due to the inherent limitations of voice recognition software  Read the chart carefully and recognize, using context, where substitutions have occurred

## 2021-06-25 NOTE — ASSESSMENT & PLAN NOTE
· Suspect gram-negative bacterial pneumonia in setting of recent hospitalization  · Continue IV cefepime  · Procalcitonin is slightly elevated @ 0 27  Repeat is 0 26, which may be secondary to dialysis      · Urine strep and legionella: pending  · MRSA nasal screen: positive

## 2021-06-26 ENCOUNTER — APPOINTMENT (INPATIENT)
Dept: DIALYSIS | Facility: HOSPITAL | Age: 56
DRG: 177 | End: 2021-06-26
Attending: INTERNAL MEDICINE
Payer: MEDICARE

## 2021-06-26 VITALS
OXYGEN SATURATION: 98 % | HEART RATE: 106 BPM | TEMPERATURE: 98.1 F | WEIGHT: 275.57 LBS | BODY MASS INDEX: 44.29 KG/M2 | RESPIRATION RATE: 18 BRPM | SYSTOLIC BLOOD PRESSURE: 134 MMHG | HEIGHT: 66 IN | DIASTOLIC BLOOD PRESSURE: 98 MMHG

## 2021-06-26 LAB
GLUCOSE SERPL-MCNC: 138 MG/DL (ref 65–140)
GLUCOSE SERPL-MCNC: 164 MG/DL (ref 65–140)

## 2021-06-26 PROCEDURE — 5A1D70Z PERFORMANCE OF URINARY FILTRATION, INTERMITTENT, LESS THAN 6 HOURS PER DAY: ICD-10-PCS | Performed by: INTERNAL MEDICINE

## 2021-06-26 PROCEDURE — 82948 REAGENT STRIP/BLOOD GLUCOSE: CPT

## 2021-06-26 PROCEDURE — 99238 HOSP IP/OBS DSCHRG MGMT 30/<: CPT | Performed by: INTERNAL MEDICINE

## 2021-06-26 PROCEDURE — 99232 SBSQ HOSP IP/OBS MODERATE 35: CPT | Performed by: INTERNAL MEDICINE

## 2021-06-26 RX ORDER — CEFDINIR 300 MG/1
300 CAPSULE ORAL
Qty: 3 CAPSULE | Refills: 0 | Status: SHIPPED | OUTPATIENT
Start: 2021-06-26 | End: 2021-07-03 | Stop reason: HOSPADM

## 2021-06-26 RX ORDER — GUAIFENESIN 600 MG
600 TABLET, EXTENDED RELEASE 12 HR ORAL 2 TIMES DAILY
Qty: 10 TABLET | Refills: 0 | Status: ON HOLD | OUTPATIENT
Start: 2021-06-26 | End: 2021-07-03

## 2021-06-26 RX ORDER — NIFEDIPINE 30 MG/1
30 TABLET, FILM COATED, EXTENDED RELEASE ORAL DAILY
Qty: 30 TABLET | Refills: 0 | Status: SHIPPED | OUTPATIENT
Start: 2021-06-27

## 2021-06-26 RX ADMIN — ACETAMINOPHEN 650 MG: 325 TABLET, FILM COATED ORAL at 10:30

## 2021-06-26 RX ADMIN — LEVOTHYROXINE SODIUM 50 MCG: 50 TABLET ORAL at 05:50

## 2021-06-26 RX ADMIN — GUAIFENESIN 600 MG: 600 TABLET, EXTENDED RELEASE ORAL at 07:48

## 2021-06-26 RX ADMIN — SERTRALINE HYDROCHLORIDE 50 MG: 50 TABLET ORAL at 07:48

## 2021-06-26 RX ADMIN — ANTACID TABLETS 1000 MG: 500 TABLET, CHEWABLE ORAL at 12:49

## 2021-06-26 RX ADMIN — CINACALCET 30 MG: 30 TABLET ORAL at 12:49

## 2021-06-26 RX ADMIN — SEVELAMER HYDROCHLORIDE 800 MG: 800 TABLET, FILM COATED PARENTERAL at 07:48

## 2021-06-26 RX ADMIN — NIFEDIPINE 30 MG: 30 TABLET, FILM COATED, EXTENDED RELEASE ORAL at 14:10

## 2021-06-26 RX ADMIN — LORATADINE 10 MG: 10 TABLET ORAL at 07:48

## 2021-06-26 RX ADMIN — PANTOPRAZOLE SODIUM 40 MG: 40 TABLET, DELAYED RELEASE ORAL at 05:50

## 2021-06-26 RX ADMIN — DOXERCALCIFEROL 2 MCG: 4 INJECTION, SOLUTION INTRAVENOUS at 10:31

## 2021-06-26 RX ADMIN — BRIMONIDINE TARTRATE 1 DROP: 1.5 SOLUTION OPHTHALMIC at 12:50

## 2021-06-26 RX ADMIN — ALPRAZOLAM 0.25 MG: 0.25 TABLET ORAL at 07:50

## 2021-06-26 RX ADMIN — SEVELAMER HYDROCHLORIDE 800 MG: 800 TABLET, FILM COATED PARENTERAL at 12:49

## 2021-06-26 NOTE — PROGRESS NOTES
NEPHROLOGY PROGRESS NOTE   Leonila Narayan 49 y o  female MRN: [id number]  Unit/Bed#: -01 Encounter: [id number]  Reason for Consult: ESRD    ASSESSMENT AND PLAN:  Patient is 61-year-old female with significant medical issues of ESRD on HD on TTS schedule, hypertension, history of PE, diabetes, presented with cough, sputum production and abnormal chest x-ray  We are consulted for dialysis management      ESRD on HD on TTS schedule [address]  -HD Today   -outpatient dry weight 124 1 kg      Access, upper extremity AV fistula     CKD anemia, hemoglobin slightly below goal, continue to monitor, not on Epogen due to history of PE   Transfuse p r n  For hemoglobin less than seven      CKD BMD, continue to monitor phosphorus, PTH  Phosphorus level at goal   Secondary hyperparathyroidism, on Hectorol and Sensipar  Continue same     Hypertension, blood pressure  overall improved  Nifedipine XL started yesterday  Continue Coreg, torsemide     Left lung pneumonia, antibiotic as per primary team    SUBJECTIVE:  Patient seen and examined at bedside  Cough is overall slowly improving, denies any worsening shortness of breath, nausea or vomiting      OBJECTIVE:  Current Weight: Weight - Scale: 125 kg (275 lb 9 2 oz)  Vitals:    06/25/21 2227   BP: 140/63   Pulse:    Resp:    Temp:    SpO2:        Intake/Output Summary (Last 24 hours) at 6/26/2021 0730  Last data filed at 6/25/2021 0800  Gross per 24 hour   Intake 180 ml   Output --   Net 180 ml     Wt Readings from Last 3 Encounters:   06/26/21 125 kg (275 lb 9 2 oz)   06/23/21 125 kg (275 lb)   06/08/21 127 kg (278 lb 15 7 oz)     Temp Readings from Last 3 Encounters:   06/25/21 99 °F (37 2 °C)   06/23/21 98 6 °F (37 °C) (Tympanic)   06/11/21 98 6 °F (37 °C)     BP Readings from Last 3 Encounters:   06/25/21 140/63   06/23/21 146/76   06/11/21 150/74     Pulse Readings from Last 3 Encounters:   06/25/21 72   06/23/21 77   06/11/21 72        Physical Examination:  General: Lying in bed, no acute distress   Eyes:  Mild conjunctival pallor present  ENT:  External examination of ears and nose unremarkable  Neck:  No obvious lymphadenopathy appreciated  Respiratory:   Bilateral air entry present  CVS:  S1, S2 present  GI:  Soft, nontender, nondistended  CNS:  Active alert oriented x3  Skin:  No new rash in legs  Musculoskeletal:  No Obvious new gross deformity noted    Medications:    Current Facility-Administered Medications:     acetaminophen (TYLENOL) tablet 650 mg, 650 mg, Oral, Q6H PRN, Nuno Max MD, 650 mg at 06/24/21 1150    albuterol (PROVENTIL HFA,VENTOLIN HFA) inhaler 2 puff, 2 puff, Inhalation, Q6H PRN, Nuno Max MD    ALPRAZolam [place]) tablet 0 25 mg, 0 25 mg, Oral, BID PRN, Nuno Max MD, 0 25 mg at 06/24/21 0804    atorvastatin (LIPITOR) tablet 20 mg, 20 mg, Oral, QPM, Nuno Max MD, 20 mg at 06/25/21 1731    atropine (ISOPTO ATROPINE) 1 % ophthalmic solution 1 drop, 1 drop, Both Eyes, HS, Nuno Max MD, 1 drop at 06/25/21 2227    benzonatate (TESSALON PERLES) capsule 100 mg, 100 mg, Oral, TID PRN, Nuno Max MD    brimonidine (ALPHAGAN P) 0 15 % ophthalmic solution 1 drop, 1 drop, Both Eyes, BID, Nuno Max MD, 1 drop at 06/25/21 1728    calcium carbonate (TUMS) chewable tablet 1,000 mg, 1,000 mg, Oral, Daily, Nuno Max MD, 1,000 mg at 06/25/21 7830    carvedilol (COREG) tablet 25 mg, 25 mg, Oral, BID With Meals, Nuno Max MD, 25 mg at 06/25/21 1651    cefepime (MAXIPIME) 1,000 mg in dextrose 5 % 50 mL IVPB, 1,000 mg, Intravenous, Q24H, Nuno Max MD, Last Rate: 100 mL/hr at 06/25/21 2225, 1,000 mg at 06/25/21 2225    cinacalcet (SENSIPAR) tablet 30 mg, 30 mg, Oral, Daily, Nuno Max MD, 30 mg at 06/25/21 0816    Diclofenac Sodium (VOLTAREN) 1 % topical gel 2 g, 2 g, Topical, 4x Daily, Nuno Max MD, 2 g at 06/25/21 2225    docusate sodium (COLACE) capsule 100 mg, 100 mg, Oral, BID PRN, Mickey Marques MD    doxercalciferol (HECTOROL) injection 2 mcg, 2 mcg, Intravenous, After Dialysis, Mickey Marques MD, 2 mcg at 06/24/21 0918    gabapentin (NEURONTIN) capsule 300 mg, 300 mg, Oral, HS, Mickey Marques MD, 300 mg at 06/25/21 2225    guaiFENesin (MUCINEX) 12 hr tablet 600 mg, 600 mg, Oral, BID, Mickey Marques MD, 600 mg at 06/25/21 1731    insulin glargine (LANTUS) subcutaneous injection 20 Units 0 2 mL, 20 Units, Subcutaneous, HS, Mickey Marques MD, 20 Units at 06/25/21 2226    insulin lispro (HumaLOG) 100 units/mL subcutaneous injection 1-5 Units, 1-5 Units, Subcutaneous, HS, Mickey Marques MD, 3 Units at 06/25/21 2225    insulin lispro (HumaLOG) 100 units/mL subcutaneous injection 2-12 Units, 2-12 Units, Subcutaneous, TID AC, 2 Units at 06/25/21 1650 **AND** Fingerstick Glucose (POCT), , , TID AC, Mickey Marques MD    levothyroxine tablet 50 mcg, 50 mcg, Oral, Early Morning, Mickey Marques MD, 50 mcg at 06/26/21 0550    lidocaine (LIDODERM) 5 % patch 1 patch, 1 patch, Topical, Daily, Mickey Marques MD, 1 patch at 06/24/21 1335    loratadine (CLARITIN) tablet 10 mg, 10 mg, Oral, Daily, Mickey Marques MD, 10 mg at 06/25/21 0815    melatonin tablet 3 mg, 3 mg, Oral, HS PRN, Chika Waite PA-C, 3 mg at 06/25/21 2241    NIFEdipine (PROCARDIA XL) 24 hr tablet 30 mg, 30 mg, Oral, Daily, Ko Pride MD, 30 mg at 06/25/21 1357    ondansetron (ZOFRAN) injection 4 mg, 4 mg, Intravenous, Q6H PRN, Mickey Marques MD, 4 mg at 06/25/21 1358    pantoprazole (PROTONIX) EC tablet 40 mg, 40 mg, Oral, Early Morning, Mickey Marques MD, 40 mg at 06/26/21 0550    sertraline (ZOLOFT) tablet 50 mg, 50 mg, Oral, Daily, Mickey Marques MD, 50 mg at 06/25/21 0815    sevelamer (RENAGEL) tablet 800 mg, 800 mg, Oral, TID With Meals, Mickey Marques MD, 800 mg at 06/25/21 1651    sodium chloride (OCEAN) 0 65 % nasal spray 1 spray, 1 spray, Each Nare, Q1H PRN, Mickey Marques, MD [facility]) tablet 100 mg, 100 mg, Oral, Q12H, Roxanne Mcdaniel MD, 100 mg at 06/25/21 2226    warfarin (COUMADIN) tablet 9 mg, 9 mg, Oral, Daily (warfarin), Roxanne Mcdaniel MD, 9 mg at 06/25/21 1731    Laboratory Results:  Results from last 7 days   Lab Units 06/24/21  0457 06/24/21  0456 06/23/21  1310 06/23/21  1253   WBC Thousand/uL 8 59  --  10 44*  --    HEMOGLOBIN g/dL 8 4*  --  8 9*  --    HEMATOCRIT % 25 7*  --  27 4*  --    PLATELETS Thousands/uL 198  --  214  --    SODIUM mmol/L  --  129*  --  134*   POTASSIUM mmol/L  --  5 0  --  4 8   CHLORIDE mmol/L  --  94*  --  97*   CO2 mmol/L  --  25  --  27   BUN mg/dL  --  66*  --  47*   CREATININE mg/dL  --  8 87*  --  7 47*   CALCIUM mg/dL  --  7 7*  --  8 8   MAGNESIUM mg/dL  --  1 9  --   --    PHOSPHORUS mg/dL  --  4 5  --   --        CTA ED chest PE Study   Final Result by Biju Rodrigues MD (06/23 2102)      No evidence of pulmonary embolism given limitations  Consolidation in the left upper lobe anteriorly and posterior medially could relate to pneumonia /infection  Posttreatment follow-up with unenhanced chest CT recommended in 3 months to exclude other etiologies  The study was marked in EPIC for significant notification  Workstation performed: [id number]             Portions of the record may have been created with voice recognition software  Occasional wrong word or "sound a like" substitutions may have occurred due to the inherent limitations of voice recognition software  Read the chart carefully and recognize, using context, where substitutions have occurred

## 2021-06-26 NOTE — PLAN OF CARE
Post-Dialysis RN Treatment Note    Blood Pressure:  Pre 130/83 mm/Hg  Post 134/98 mmHg   EDW  124 1 kg    Weight:  Pre 129 1 kg   Post 124 7 kg   Mode of weight measurement: Standing Scale   Volume Removed  4395 ml's TOTAL 3895ml's NET    Treatment duration 240 minutes    NS given  No    Treatment shortened? No   Medications given during Rx Tylonel 650mg, Hectorol 2mcg   Estimated Kt/V  1 21   Access type: AV fistula   Access Status: Yes, describe: able to maintain prescribed BFR of 400, dcs'd to 300 d t pt moving arm frequently    Report called to primary nurse   Yes    4 hour HD treatment on a 2K acid bath today with UF goal of 4L  Plan of care discussed with pt and Dr Pride      Problem: METABOLIC, FLUID AND ELECTROLYTES - ADULT  Goal: Electrolytes maintained within normal limits  Description: INTERVENTIONS:  - Monitor labs and assess patient for signs and symptoms of electrolyte imbalances  - Administer electrolyte replacement as ordered  - Monitor response to electrolyte replacements, including repeat lab results as appropriate  - Instruct patient on fluid and nutrition as appropriate  Outcome: Progressing  Goal: Fluid balance maintained  Description: INTERVENTIONS:  - Monitor labs   - Monitor I/O and WT  - Instruct patient on fluid and nutrition as appropriate  - Assess for signs & symptoms of volume excess or deficit  Outcome: Progressing

## 2021-06-26 NOTE — PLAN OF CARE
Problem: PAIN - ADULT  Goal: Verbalizes/displays adequate comfort level or baseline comfort level  Description: Interventions:  - Encourage patient to monitor pain and request assistance  - Assess pain using appropriate pain scale  - Administer analgesics based on type and severity of pain and evaluate response  - Implement non-pharmacological measures as appropriate and evaluate response  - Consider cultural and social influences on pain and pain management  - Notify physician/advanced practitioner if interventions unsuccessful or patient reports new pain  6/26/2021 1454 by Yisel Dickens RN  Outcome: Completed  6/26/2021 1102 by Yisel Dickens RN  Outcome: Progressing     Problem: INFECTION - ADULT  Goal: Absence or prevention of progression during hospitalization  Description: INTERVENTIONS:  - Assess and monitor for signs and symptoms of infection  - Monitor lab/diagnostic results  - Monitor all insertion sites, i e  indwelling lines, tubes, and drains  - Monitor endotracheal if appropriate and nasal secretions for changes in amount and color  - [place] appropriate cooling/warming therapies per order  - Administer medications as ordered  - Instruct and encourage patient and family to use good hand hygiene technique  - Identify and instruct in appropriate isolation precautions for identified infection/condition  6/26/2021 1454 by Yisel Dickens RN  Outcome: Completed  6/26/2021 1102 by Yisel Dickens RN  Outcome: Progressing     Problem: SAFETY ADULT  Goal: Patient will remain free of falls  Description: INTERVENTIONS:  - Educate patient/family on patient safety including physical limitations  - Instruct patient to call for assistance with activity   - Consult OT/PT to assist with strengthening/mobility   - Keep Call bell within reach  - Keep bed low and locked with side rails adjusted as appropriate  - Keep care items and personal belongings within reach  - Initiate and maintain comfort rounds  - Make Fall Risk Sign visible to staff  - Apply yellow socks and bracelet for high fall risk patients  - Consider moving patient to room near nurses station  Outcome: Completed  Goal: Maintain or return to baseline ADL function  Description: INTERVENTIONS:  -  Assess patient's ability to carry out ADLs; assess patient's baseline for ADL function and identify physical deficits which impact ability to perform ADLs (bathing, care of mouth/teeth, toileting, grooming, dressing, etc )  - Assess/evaluate cause of self-care deficits   - Assess range of motion  - Assess patient's mobility; develop plan if impaired  - Assess patient's need for assistive devices and provide as appropriate  - Encourage maximum independence but intervene and supervise when necessary  - Involve family in performance of ADLs  - Assess for home care needs following discharge   - Consider OT consult to assist with ADL evaluation and planning for discharge  - Provide patient education as appropriate  Outcome: Completed  Goal: Maintains/Returns to pre admission functional level  Description: INTERVENTIONS:  - Perform BMAT or MOVE assessment daily    - Set and communicate daily mobility goal to care team and patient/family/caregiver     - Collaborate with rehabilitation services on mobility goals if consulted  - Out of bed for toileting  - Record patient progress and toleration of activity level   Outcome: Completed     Problem: DISCHARGE PLANNING  Goal: Discharge to home or other facility with appropriate resources  Description: INTERVENTIONS:  - Identify barriers to discharge w/patient and caregiver  - Arrange for needed discharge resources and transportation as appropriate  - Identify discharge learning needs (meds, wound care, etc )  - Arrange for interpretive services to assist at discharge as needed  - Refer to Case Management Department for coordinating discharge planning if the patient needs post-hospital services based on physician/advanced practitioner order or complex needs related to functional status, cognitive ability, or social support system  6/26/2021 1454 by Lety Pizarro RN  Outcome: Completed  6/26/2021 1102 by Lety Pizarro RN  Outcome: Progressing     Problem: Knowledge Deficit  Goal: Patient/family/caregiver demonstrates understanding of disease process, treatment plan, medications, and discharge instructions  Description: Complete learning assessment and assess knowledge base    Interventions:  - Provide teaching at level of understanding  - Provide teaching via preferred learning methods  6/26/2021 1454 by Lety Pizarro RN  Outcome: Completed  6/26/2021 1102 by Lety Pizarro RN  Outcome: Progressing     Problem: METABOLIC, FLUID AND ELECTROLYTES - ADULT  Goal: Electrolytes maintained within normal limits  Description: INTERVENTIONS:  - Monitor labs and assess patient for signs and symptoms of electrolyte imbalances  - Administer electrolyte replacement as ordered  - Monitor response to electrolyte replacements, including repeat lab results as appropriate  - Instruct patient on fluid and nutrition as appropriate  6/26/2021 1454 by Lety Pizarro RN  Outcome: Completed  6/26/2021 1102 by Lety Pizarro RN  Outcome: Progressing  Goal: Fluid balance maintained  Description: INTERVENTIONS:  - Monitor labs   - Monitor I/O and WT  - Instruct patient on fluid and nutrition as appropriate  - Assess for signs & symptoms of volume excess or deficit  6/26/2021 1454 by Lety Pizarro RN  Outcome: Completed  6/26/2021 1102 by Lety Pizarro RN  Outcome: Progressing     Problem: HEMATOLOGIC - ADULT  Goal: Maintains hematologic stability  Description: INTERVENTIONS  - Assess for signs and symptoms of bleeding or hemorrhage  - Monitor labs  - Administer supportive blood products/factors as ordered and appropriate  6/26/2021 1454 by Lety Pizarro RN  Outcome: Completed  6/26/2021 1102 by Malia Barlow RN  Outcome: Progressing     Problem: Nutrition/Hydration-ADULT  Goal: Nutrient/Hydration intake appropriate for improving, restoring or maintaining nutritional needs  Description: Monitor and assess patient's nutrition/hydration status for malnutrition  Collaborate with interdisciplinary team and initiate plan and interventions as ordered  Monitor patient's weight and dietary intake as ordered or per policy  Utilize nutrition screening tool and intervene as necessary  Determine patient's food preferences and provide high-protein, high-caloric foods as appropriate       INTERVENTIONS:  - Monitor oral intake, urinary output, labs, and treatment plans  - Assess nutrition and hydration status and recommend course of action  - Evaluate amount of meals eaten  - Assist patient with eating if necessary   - Allow adequate time for meals  - Recommend/ encourage appropriate diets, oral nutritional supplements, and vitamin/mineral supplements  - Order, calculate, and assess calorie counts as needed  - Recommend, monitor, and adjust tube feedings and TPN/PPN based on assessed needs  - Assess need for intravenous fluids  - Provide specific nutrition/hydration education as appropriate  - Include patient/family/caregiver in decisions related to nutrition  6/26/2021 7264 by Malia Barlow RN  Outcome: Completed  6/26/2021 1102 by Malia Barlow, RN  Outcome: Progressing

## 2021-06-26 NOTE — PLAN OF CARE
Problem: PAIN - ADULT  Goal: Verbalizes/displays adequate comfort level or baseline comfort level  Description: Interventions:  - Encourage patient to monitor pain and request assistance  - Assess pain using appropriate pain scale  - Administer analgesics based on type and severity of pain and evaluate response  - Implement non-pharmacological measures as appropriate and evaluate response  - Consider cultural and social influences on pain and pain management  - Notify physician/advanced practitioner if interventions unsuccessful or patient reports new pain  Outcome: Progressing     Problem: INFECTION - ADULT  Goal: Absence or prevention of progression during hospitalization  Description: INTERVENTIONS:  - Assess and monitor for signs and symptoms of infection  - Monitor lab/diagnostic results  - Monitor all insertion sites, i e  indwelling lines, tubes, and drains  - Monitor endotracheal if appropriate and nasal secretions for changes in amount and color  - [place] appropriate cooling/warming therapies per order  - Administer medications as ordered  - Instruct and encourage patient and family to use good hand hygiene technique  - Identify and instruct in appropriate isolation precautions for identified infection/condition  Outcome: Progressing     Problem: SAFETY ADULT  Goal: Patient will remain free of falls  Description: INTERVENTIONS:  - Educate patient/family on patient safety including physical limitations  - Instruct patient to call for assistance with activity   - Consult OT/PT to assist with strengthening/mobility   - Keep Call bell within reach  - Keep bed low and locked with side rails adjusted as appropriate  - Keep care items and personal belongings within reach  - Initiate and maintain comfort rounds  - Make Fall Risk Sign visible to staff  - Apply yellow socks and bracelet for high fall risk patients  - Consider moving patient to room near nurses station  Outcome: Completed  Goal: Maintain or return to baseline ADL function  Description: INTERVENTIONS:  -  Assess patient's ability to carry out ADLs; assess patient's baseline for ADL function and identify physical deficits which impact ability to perform ADLs (bathing, care of mouth/teeth, toileting, grooming, dressing, etc )  - Assess/evaluate cause of self-care deficits   - Assess range of motion  - Assess patient's mobility; develop plan if impaired  - Assess patient's need for assistive devices and provide as appropriate  - Encourage maximum independence but intervene and supervise when necessary  - Involve family in performance of ADLs  - Assess for home care needs following discharge   - Consider OT consult to assist with ADL evaluation and planning for discharge  - Provide patient education as appropriate  Outcome: Completed  Goal: Maintains/Returns to pre admission functional level  Description: INTERVENTIONS:  - Perform BMAT or MOVE assessment daily    - Set and communicate daily mobility goal to care team and patient/family/caregiver     - Collaborate with rehabilitation services on mobility goals if consulted  - Ambulate patient 3 times a day  - Out of bed for toileting  - Record patient progress and toleration of activity level   Outcome: Completed     Problem: DISCHARGE PLANNING  Goal: Discharge to home or other facility with appropriate resources  Description: INTERVENTIONS:  - Identify barriers to discharge w/patient and caregiver  - Arrange for needed discharge resources and transportation as appropriate  - Identify discharge learning needs (meds, wound care, etc )  - Arrange for interpretive services to assist at discharge as needed  - Refer to Case Management Department for coordinating discharge planning if the patient needs post-hospital services based on physician/advanced practitioner order or complex needs related to functional status, cognitive ability, or social support system  Outcome: Progressing     Problem: Knowledge Deficit  Goal: Patient/family/caregiver demonstrates understanding of disease process, treatment plan, medications, and discharge instructions  Description: Complete learning assessment and assess knowledge base  Interventions:  - Provide teaching at level of understanding  - Provide teaching via preferred learning methods  Outcome: Progressing     Problem: METABOLIC, FLUID AND ELECTROLYTES - ADULT  Goal: Electrolytes maintained within normal limits  Description: INTERVENTIONS:  - Monitor labs and assess patient for signs and symptoms of electrolyte imbalances  - Administer electrolyte replacement as ordered  - Monitor response to electrolyte replacements, including repeat lab results as appropriate  - Instruct patient on fluid and nutrition as appropriate  Outcome: Progressing  Goal: Fluid balance maintained  Description: INTERVENTIONS:  - Monitor labs   - Monitor I/O and WT  - Instruct patient on fluid and nutrition as appropriate  - Assess for signs & symptoms of volume excess or deficit  Outcome: Progressing     Problem: HEMATOLOGIC - ADULT  Goal: Maintains hematologic stability  Description: INTERVENTIONS  - Assess for signs and symptoms of bleeding or hemorrhage  - Monitor labs  - Administer supportive blood products/factors as ordered and appropriate  Outcome: Progressing     Problem: Nutrition/Hydration-ADULT  Goal: Nutrient/Hydration intake appropriate for improving, restoring or maintaining nutritional needs  Description: Monitor and assess patient's nutrition/hydration status for malnutrition  Collaborate with interdisciplinary team and initiate plan and interventions as ordered  Monitor patient's weight and dietary intake as ordered or per policy  Utilize nutrition screening tool and intervene as necessary  Determine patient's food preferences and provide high-protein, high-caloric foods as appropriate       INTERVENTIONS:  - Monitor oral intake, urinary output, labs, and treatment plans  - Assess nutrition and hydration status and recommend course of action  - Evaluate amount of meals eaten  - Assist patient with eating if necessary   - Allow adequate time for meals  - Recommend/ encourage appropriate diets, oral nutritional supplements, and vitamin/mineral supplements  - Order, calculate, and assess calorie counts as needed  - Assess need for intravenous fluids  - Provide specific nutrition/hydration education as appropriate  - Include patient/family/caregiver in decisions related to nutrition  Outcome: Progressing

## 2021-06-27 ENCOUNTER — APPOINTMENT (EMERGENCY)
Dept: RADIOLOGY | Facility: HOSPITAL | Age: 56
DRG: 177 | End: 2021-06-27
Payer: MEDICARE

## 2021-06-27 ENCOUNTER — HOSPITAL ENCOUNTER (INPATIENT)
Facility: HOSPITAL | Age: 56
LOS: 6 days | Discharge: HOME HEALTH | DRG: 177 | End: 2021-07-03
Attending: EMERGENCY MEDICINE | Admitting: INTERNAL MEDICINE
Payer: MEDICARE

## 2021-06-27 DIAGNOSIS — R06.00 DYSPNEA: ICD-10-CM

## 2021-06-27 DIAGNOSIS — J18.9 PNEUMONIA: Primary | ICD-10-CM

## 2021-06-27 DIAGNOSIS — E11.42 TYPE 2 DIABETES MELLITUS WITH DIABETIC POLYNEUROPATHY, UNSPECIFIED WHETHER LONG TERM INSULIN USE (HCC): ICD-10-CM

## 2021-06-27 DIAGNOSIS — J18.9 RECURRENT PNEUMONIA: ICD-10-CM

## 2021-06-27 DIAGNOSIS — J18.9 HEALTHCARE-ASSOCIATED PNEUMONIA: ICD-10-CM

## 2021-06-27 DIAGNOSIS — Z99.2 ESRD ON HEMODIALYSIS (HCC): ICD-10-CM

## 2021-06-27 DIAGNOSIS — N18.6 ESRD ON HEMODIALYSIS (HCC): ICD-10-CM

## 2021-06-27 DIAGNOSIS — R68.89 FLU-LIKE SYMPTOMS: ICD-10-CM

## 2021-06-27 PROBLEM — I10 HYPERTENSION: Status: ACTIVE | Noted: 2021-06-27

## 2021-06-27 PROBLEM — J15.9 HEALTHCARE ASSOCIATED BACTERIAL PNEUMONIA: Status: ACTIVE | Noted: 2021-06-27

## 2021-06-27 PROBLEM — Z79.01 CHRONIC ANTICOAGULATION: Status: ACTIVE | Noted: 2021-06-27

## 2021-06-27 LAB
ALBUMIN SERPL BCP-MCNC: 2.9 G/DL (ref 3.5–5)
ALP SERPL-CCNC: 198 U/L (ref 46–116)
ALT SERPL W P-5'-P-CCNC: 23 U/L (ref 12–78)
ANION GAP SERPL CALCULATED.3IONS-SCNC: 9 MMOL/L (ref 4–13)
AST SERPL W P-5'-P-CCNC: 13 U/L (ref 5–45)
ATRIAL RATE: 88 BPM
BASOPHILS # BLD AUTO: 0.06 THOUSANDS/ΜL (ref 0–0.1)
BASOPHILS NFR BLD AUTO: 1 % (ref 0–1)
BILIRUB SERPL-MCNC: 0.49 MG/DL (ref 0.2–1)
BUN SERPL-MCNC: 45 MG/DL (ref 5–25)
CALCIUM ALBUM COR SERPL-MCNC: 9.3 MG/DL (ref 8.3–10.1)
CALCIUM SERPL-MCNC: 8.4 MG/DL (ref 8.3–10.1)
CHLORIDE SERPL-SCNC: 100 MMOL/L (ref 100–108)
CO2 SERPL-SCNC: 26 MMOL/L (ref 21–32)
CREAT SERPL-MCNC: 6.8 MG/DL (ref 0.6–1.3)
EOSINOPHIL # BLD AUTO: 0.11 THOUSAND/ΜL (ref 0–0.61)
EOSINOPHIL NFR BLD AUTO: 1 % (ref 0–6)
ERYTHROCYTE [DISTWIDTH] IN BLOOD BY AUTOMATED COUNT: 12.9 % (ref 11.6–15.1)
GFR SERPL CREATININE-BSD FRML MDRD: 6 ML/MIN/1.73SQ M
GLUCOSE SERPL-MCNC: 293 MG/DL (ref 65–140)
GLUCOSE SERPL-MCNC: 334 MG/DL (ref 65–140)
HCT VFR BLD AUTO: 27.6 % (ref 34.8–46.1)
HGB BLD-MCNC: 9.3 G/DL (ref 11.5–15.4)
IMM GRANULOCYTES # BLD AUTO: 0.07 THOUSAND/UL (ref 0–0.2)
IMM GRANULOCYTES NFR BLD AUTO: 1 % (ref 0–2)
LYMPHOCYTES # BLD AUTO: 1.45 THOUSANDS/ΜL (ref 0.6–4.47)
LYMPHOCYTES NFR BLD AUTO: 16 % (ref 14–44)
MCH RBC QN AUTO: 30.9 PG (ref 26.8–34.3)
MCHC RBC AUTO-ENTMCNC: 33.7 G/DL (ref 31.4–37.4)
MCV RBC AUTO: 92 FL (ref 82–98)
MONOCYTES # BLD AUTO: 0.59 THOUSAND/ΜL (ref 0.17–1.22)
MONOCYTES NFR BLD AUTO: 7 % (ref 4–12)
NEUTROPHILS # BLD AUTO: 6.68 THOUSANDS/ΜL (ref 1.85–7.62)
NEUTS SEG NFR BLD AUTO: 74 % (ref 43–75)
NRBC BLD AUTO-RTO: 0 /100 WBCS
P AXIS: 13 DEGREES
PLATELET # BLD AUTO: 228 THOUSANDS/UL (ref 149–390)
PMV BLD AUTO: 10.5 FL (ref 8.9–12.7)
POTASSIUM SERPL-SCNC: 5.1 MMOL/L (ref 3.5–5.3)
PR INTERVAL: 150 MS
PROT SERPL-MCNC: 7.1 G/DL (ref 6.4–8.2)
QRS AXIS: 54 DEGREES
QRSD INTERVAL: 94 MS
QT INTERVAL: 382 MS
QTC INTERVAL: 462 MS
RBC # BLD AUTO: 3.01 MILLION/UL (ref 3.81–5.12)
SODIUM SERPL-SCNC: 135 MMOL/L (ref 136–145)
T WAVE AXIS: 70 DEGREES
TROPONIN I SERPL-MCNC: <0.02 NG/ML
TROPONIN I SERPL-MCNC: <0.02 NG/ML
VENTRICULAR RATE: 88 BPM
WBC # BLD AUTO: 8.96 THOUSAND/UL (ref 4.31–10.16)

## 2021-06-27 PROCEDURE — 71046 X-RAY EXAM CHEST 2 VIEWS: CPT

## 2021-06-27 PROCEDURE — 93010 ELECTROCARDIOGRAM REPORT: CPT | Performed by: INTERNAL MEDICINE

## 2021-06-27 PROCEDURE — 82948 REAGENT STRIP/BLOOD GLUCOSE: CPT

## 2021-06-27 PROCEDURE — 85025 COMPLETE CBC W/AUTO DIFF WBC: CPT | Performed by: EMERGENCY MEDICINE

## 2021-06-27 PROCEDURE — 84484 ASSAY OF TROPONIN QUANT: CPT | Performed by: INTERNAL MEDICINE

## 2021-06-27 PROCEDURE — 99285 EMERGENCY DEPT VISIT HI MDM: CPT | Performed by: EMERGENCY MEDICINE

## 2021-06-27 PROCEDURE — 71250 CT THORAX DX C-: CPT

## 2021-06-27 PROCEDURE — G1004 CDSM NDSC: HCPCS

## 2021-06-27 PROCEDURE — 87040 BLOOD CULTURE FOR BACTERIA: CPT | Performed by: INTERNAL MEDICINE

## 2021-06-27 PROCEDURE — 84484 ASSAY OF TROPONIN QUANT: CPT | Performed by: EMERGENCY MEDICINE

## 2021-06-27 PROCEDURE — 80053 COMPREHEN METABOLIC PANEL: CPT | Performed by: EMERGENCY MEDICINE

## 2021-06-27 PROCEDURE — 99222 1ST HOSP IP/OBS MODERATE 55: CPT | Performed by: INTERNAL MEDICINE

## 2021-06-27 PROCEDURE — 99285 EMERGENCY DEPT VISIT HI MDM: CPT

## 2021-06-27 PROCEDURE — 36415 COLL VENOUS BLD VENIPUNCTURE: CPT | Performed by: EMERGENCY MEDICINE

## 2021-06-27 PROCEDURE — 93005 ELECTROCARDIOGRAM TRACING: CPT

## 2021-06-27 PROCEDURE — 96374 THER/PROPH/DIAG INJ IV PUSH: CPT

## 2021-06-27 RX ORDER — BRIMONIDINE TARTRATE 0.15 %
1 DROPS OPHTHALMIC (EYE) 2 TIMES DAILY
Status: DISCONTINUED | OUTPATIENT
Start: 2021-06-27 | End: 2021-07-03 | Stop reason: HOSPADM

## 2021-06-27 RX ORDER — SODIUM POLYSTYRENE SULFONATE 15 G/60ML
15 SUSPENSION ORAL; RECTAL ONCE
Status: COMPLETED | OUTPATIENT
Start: 2021-06-27 | End: 2021-06-27

## 2021-06-27 RX ORDER — ONDANSETRON 2 MG/ML
4 INJECTION INTRAMUSCULAR; INTRAVENOUS EVERY 6 HOURS PRN
Status: DISCONTINUED | OUTPATIENT
Start: 2021-06-27 | End: 2021-07-03 | Stop reason: HOSPADM

## 2021-06-27 RX ORDER — ALPRAZOLAM 0.25 MG/1
0.25 TABLET ORAL 2 TIMES DAILY PRN
Status: DISCONTINUED | OUTPATIENT
Start: 2021-06-27 | End: 2021-07-03 | Stop reason: HOSPADM

## 2021-06-27 RX ORDER — CARVEDILOL 25 MG/1
25 TABLET ORAL 2 TIMES DAILY WITH MEALS
Status: DISCONTINUED | OUTPATIENT
Start: 2021-06-28 | End: 2021-07-03 | Stop reason: HOSPADM

## 2021-06-27 RX ORDER — ACETAMINOPHEN 325 MG/1
650 TABLET ORAL EVERY 6 HOURS PRN
Status: DISCONTINUED | OUTPATIENT
Start: 2021-06-27 | End: 2021-07-03 | Stop reason: HOSPADM

## 2021-06-27 RX ORDER — HEPARIN SODIUM 5000 [USP'U]/ML
5000 INJECTION, SOLUTION INTRAVENOUS; SUBCUTANEOUS EVERY 8 HOURS SCHEDULED
Status: DISCONTINUED | OUTPATIENT
Start: 2021-06-27 | End: 2021-06-27

## 2021-06-27 RX ORDER — SENNOSIDES 8.6 MG
2 TABLET ORAL
Status: DISCONTINUED | OUTPATIENT
Start: 2021-06-27 | End: 2021-07-03 | Stop reason: HOSPADM

## 2021-06-27 RX ORDER — PREDNISOLONE ACETATE 10 MG/ML
1 SUSPENSION/ DROPS OPHTHALMIC 4 TIMES DAILY
Status: DISCONTINUED | OUTPATIENT
Start: 2021-06-27 | End: 2021-07-03 | Stop reason: HOSPADM

## 2021-06-27 RX ORDER — ECHINACEA PURPUREA EXTRACT 125 MG
1 TABLET ORAL 3 TIMES DAILY PRN
Status: DISCONTINUED | OUTPATIENT
Start: 2021-06-27 | End: 2021-07-03 | Stop reason: HOSPADM

## 2021-06-27 RX ORDER — GUAIFENESIN/DEXTROMETHORPHAN 100-10MG/5
10 SYRUP ORAL EVERY 4 HOURS PRN
Status: DISCONTINUED | OUTPATIENT
Start: 2021-06-27 | End: 2021-07-03 | Stop reason: HOSPADM

## 2021-06-27 RX ORDER — LEVOTHYROXINE SODIUM 0.05 MG/1
50 TABLET ORAL
Status: DISCONTINUED | OUTPATIENT
Start: 2021-06-28 | End: 2021-07-03 | Stop reason: HOSPADM

## 2021-06-27 RX ORDER — CALCIUM CARBONATE 1250 MG/5ML
1250 SUSPENSION ORAL DAILY
Status: DISCONTINUED | OUTPATIENT
Start: 2021-06-28 | End: 2021-07-03 | Stop reason: HOSPADM

## 2021-06-27 RX ORDER — LIDOCAINE 50 MG/G
1 PATCH TOPICAL DAILY
Status: DISCONTINUED | OUTPATIENT
Start: 2021-06-28 | End: 2021-07-03 | Stop reason: HOSPADM

## 2021-06-27 RX ORDER — INSULIN GLARGINE 100 [IU]/ML
20 INJECTION, SOLUTION SUBCUTANEOUS
Status: DISCONTINUED | OUTPATIENT
Start: 2021-06-27 | End: 2021-07-03 | Stop reason: HOSPADM

## 2021-06-27 RX ORDER — CINACALCET 30 MG/1
30 TABLET, FILM COATED ORAL DAILY
Status: DISCONTINUED | OUTPATIENT
Start: 2021-06-28 | End: 2021-07-03 | Stop reason: HOSPADM

## 2021-06-27 RX ORDER — LORAZEPAM 2 MG/ML
0.5 INJECTION INTRAMUSCULAR ONCE
Status: COMPLETED | OUTPATIENT
Start: 2021-06-27 | End: 2021-06-27

## 2021-06-27 RX ORDER — MAGNESIUM HYDROXIDE/ALUMINUM HYDROXICE/SIMETHICONE 120; 1200; 1200 MG/30ML; MG/30ML; MG/30ML
30 SUSPENSION ORAL EVERY 6 HOURS PRN
Status: DISCONTINUED | OUTPATIENT
Start: 2021-06-27 | End: 2021-07-03 | Stop reason: HOSPADM

## 2021-06-27 RX ORDER — ATROPINE SULFATE 10 MG/ML
1 SOLUTION/ DROPS OPHTHALMIC
Status: DISCONTINUED | OUTPATIENT
Start: 2021-06-27 | End: 2021-07-03 | Stop reason: HOSPADM

## 2021-06-27 RX ORDER — TORSEMIDE 20 MG/1
100 TABLET ORAL EVERY 12 HOURS
Status: DISCONTINUED | OUTPATIENT
Start: 2021-06-27 | End: 2021-07-03 | Stop reason: HOSPADM

## 2021-06-27 RX ORDER — GUAIFENESIN 600 MG
600 TABLET, EXTENDED RELEASE 12 HR ORAL EVERY 12 HOURS
Status: DISCONTINUED | OUTPATIENT
Start: 2021-06-27 | End: 2021-07-03 | Stop reason: HOSPADM

## 2021-06-27 RX ORDER — NIFEDIPINE 30 MG/1
30 TABLET, EXTENDED RELEASE ORAL DAILY
Status: DISCONTINUED | OUTPATIENT
Start: 2021-06-28 | End: 2021-07-03 | Stop reason: HOSPADM

## 2021-06-27 RX ORDER — ATORVASTATIN CALCIUM 20 MG/1
20 TABLET, FILM COATED ORAL EVERY EVENING
Status: DISCONTINUED | OUTPATIENT
Start: 2021-06-27 | End: 2021-07-03 | Stop reason: HOSPADM

## 2021-06-27 RX ORDER — LORATADINE 10 MG/1
10 TABLET ORAL DAILY
Status: DISCONTINUED | OUTPATIENT
Start: 2021-06-28 | End: 2021-07-03 | Stop reason: HOSPADM

## 2021-06-27 RX ORDER — GABAPENTIN 300 MG/1
300 CAPSULE ORAL DAILY
Status: DISCONTINUED | OUTPATIENT
Start: 2021-06-28 | End: 2021-07-03 | Stop reason: HOSPADM

## 2021-06-27 RX ORDER — SEVELAMER HYDROCHLORIDE 800 MG/1
800 TABLET, FILM COATED ORAL
Status: DISCONTINUED | OUTPATIENT
Start: 2021-06-28 | End: 2021-07-03 | Stop reason: HOSPADM

## 2021-06-27 RX ORDER — PANTOPRAZOLE SODIUM 40 MG/1
40 TABLET, DELAYED RELEASE ORAL
Status: DISCONTINUED | OUTPATIENT
Start: 2021-06-28 | End: 2021-07-03 | Stop reason: HOSPADM

## 2021-06-27 RX ADMIN — ATROPINE SULFATE 1 DROP: 10 SOLUTION/ DROPS OPHTHALMIC at 22:14

## 2021-06-27 RX ADMIN — LORAZEPAM 0.5 MG: 2 INJECTION INTRAMUSCULAR; INTRAVENOUS at 15:59

## 2021-06-27 RX ADMIN — GUAIFENESIN 600 MG: 600 TABLET, EXTENDED RELEASE ORAL at 22:13

## 2021-06-27 RX ADMIN — INSULIN GLARGINE 20 UNITS: 100 INJECTION, SOLUTION SUBCUTANEOUS at 22:20

## 2021-06-27 RX ADMIN — SODIUM POLYSTYRENE SULFONATE 15 G: 15 SUSPENSION ORAL; RECTAL at 22:14

## 2021-06-27 RX ADMIN — BRIMONIDINE TARTRATE 1 DROP: 1.5 SOLUTION OPHTHALMIC at 22:14

## 2021-06-27 RX ADMIN — INSULIN LISPRO 3 UNITS: 100 INJECTION, SOLUTION INTRAVENOUS; SUBCUTANEOUS at 22:22

## 2021-06-27 RX ADMIN — VANCOMYCIN HYDROCHLORIDE 1750 MG: 10 INJECTION, POWDER, LYOPHILIZED, FOR SOLUTION INTRAVENOUS at 22:08

## 2021-06-27 RX ADMIN — ATORVASTATIN CALCIUM 20 MG: 20 TABLET, FILM COATED ORAL at 22:12

## 2021-06-27 RX ADMIN — PREDNISOLONE ACETATE 1 DROP: 10 SUSPENSION/ DROPS OPHTHALMIC at 22:13

## 2021-06-27 RX ADMIN — SENNOSIDES 17.2 MG: 8.6 TABLET, FILM COATED ORAL at 22:13

## 2021-06-27 RX ADMIN — GUAIFENESIN AND DEXTROMETHORPHAN 10 ML: 100; 10 SYRUP ORAL at 22:12

## 2021-06-27 RX ADMIN — CEFEPIME HYDROCHLORIDE 1000 MG: 1 INJECTION, POWDER, FOR SOLUTION INTRAMUSCULAR; INTRAVENOUS at 21:06

## 2021-06-27 RX ADMIN — TORSEMIDE 100 MG: 20 TABLET ORAL at 22:12

## 2021-06-27 NOTE — ED PROVIDER NOTES
History  Chief Complaint   Patient presents with    Shortness of Breath     pt d/c from hospital for pneumonia  pt feels she was discharged too early, pt very anxious at  this time  pt c/o chest tightness     Patient is a 49year old female with ESRD on dialysis who presents for evaluation of chest tightness and shortness of breath  Patient was just recently admitted for treatment of a pneumonia, and was discharged from the hospital yesterday with oral antibiotics  Patient states that she did not take the antibiotics yesterday evening  Today, she began to feel more short of breath, and had chest tightness  Patient also had 2 episodes of vomiting today  Patient is very anxious that she was "discharged too soon"  Patient denies any fevers, chills, worsening cough, congestion, chest pain, abdominal pain, or urinary symptoms at this time  History provided by:  Patient and medical records   used: No        Prior to Admission Medications   Prescriptions Last Dose Informant Patient Reported? Taking? ALPRAZolam (XANAX) 0 25 mg tablet  Self Yes No   Sig: Take 0 25 mg by mouth 2 (two) times a day as needed for anxiety   Accu-Chek Softclix Lancets lancets  Self Yes No   Sig: 3 (three) times a day Use to test blood sugar   CALCIUM CARBONATE PO  Self Yes No   Sig: Take 1,000 mg by mouth daily     KIONEX 15 GM/60ML suspension  Self Yes No   Sig: Take by mouth Takes as a shake on dialysis days Tues-Thurs_Sat   NIFEdipine (ADALAT CC) 30 MG 24 hr tablet   No No   Sig: Take 1 tablet (30 mg total) by mouth daily   NOVOLOG FLEXPEN 100 units/mL SOPN  Self Yes No   Sig: INJECT 1 TO 5 UNITS SUBCUTANEOUSLY THREE TIMES A DAY BEFORE MELAS AS PER SLIDING SCALE   Podiatric Products (Flexitol Heel Balm) OINT   No No   Sig: Apply topically once for 1 dose   acetaminophen (TYLENOL) 325 mg tablet  Self No No   Sig: Take 2 tablets (650 mg total) by mouth every 6 (six) hours as needed for mild pain or fever   albuterol (ProAir HFA) 90 mcg/act inhaler  Self No No   Sig: Inhale 2 puffs every 6 (six) hours as needed for wheezing or shortness of breath   atorvastatin (LIPITOR) 20 mg tablet  Self Yes No   Sig: Take 20 mg by mouth every evening    atropine (ISOPTO ATROPINE) 1 % ophthalmic solution  Self Yes No   Sig: Administer 1 drop to both eyes daily at bedtime    benzonatate (TESSALON PERLES) 100 mg capsule  Self No No   Sig: Take 1 capsule (100 mg total) by mouth 3 (three) times a day as needed for cough   brimonidine (ALPHAGAN P) 0 15 % ophthalmic solution  Self Yes No   Sig: Administer 1 drop to both eyes 2 (two) times a day    carvedilol (COREG) 25 mg tablet  Self Yes No   Sig: Take 25 mg by mouth 2 (two) times a day with meals     cefdinir (OMNICEF) 300 mg capsule   No No   Sig: Take 1 capsule (300 mg total) by mouth every other day for 3 doses   cinacalcet (SENSIPAR) 30 mg tablet  Self Yes No   Sig: Take 30 mg by mouth daily   diclofenac sodium (VOLTAREN) 1 %  Self No No   Sig: Apply 2 g topically 4 (four) times a day   diphenhydrAMINE (BENADRYL) 25 mg capsule  Self Yes No   Sig: Take by mouth as needed for itching     gabapentin (NEURONTIN) 100 mg capsule  Self No No   Sig: Take 3 capsules (300 mg total) by mouth daily at bedtime   guaiFENesin (MUCINEX) 600 mg 12 hr tablet   No No   Sig: Take 1 tablet (600 mg total) by mouth 2 (two) times a day for 5 days   insulin glargine (LANTUS) 100 units/mL subcutaneous injection  Self No No   Sig: Inject 20 Units under the skin daily at bedtime   levothyroxine 50 mcg tablet  Self Yes No   Sig: Take 50 mcg by mouth daily   lidocaine (LIDODERM) 5 %  Self Yes No   Sig: APPLY 1 PATCH BY TRANDERMAL ROUTE EVERY DAY (MAY WEAR UP TO 12 HOURS)     loratadine (CLARITIN) 10 mg tablet  Self Yes No   Sig: Take 10 mg by mouth daily   nystatin (MYCOSTATIN) powder  Self No No   Sig: Apply topically 2 (two) times a day   ondansetron (ZOFRAN) 4 mg tablet  Self No No   Sig: Take 1 tablet (4 mg total) by mouth every 8 (eight) hours as needed for nausea or vomiting   pantoprazole (PROTONIX) 40 mg tablet  Self No No   Sig: Take 1 tablet (40 mg total) by mouth daily in the early morning   prednisoLONE acetate (PRED FORTE) 1 % ophthalmic suspension  Self Yes No   Sig: Administer 1 drop to both eyes 4 (four) times a day Patient takes only occasionally   senna (SENOKOT) 8 6 mg  Self No No   Sig: Take 2 tablets (17 2 mg total) by mouth daily at bedtime as needed for constipation   sertraline (ZOLOFT) 50 mg tablet  Self No No   Sig: Take 1 tablet (50 mg total) by mouth daily   sevelamer (RENAGEL) 800 mg tablet  Self No No   Sig: Take 1 tablet (800 mg total) by mouth 3 (three) times a day with meals   sodium chloride (OCEAN) 0 65 % nasal spray  Self No No   Si spray into each nostril 3 (three) times a day as needed for congestion   torsemide (DEMADEX) 100 mg tablet  Self Yes No   Sig: Take 100 mg by mouth every 12 (twelve) hours    warfarin (COUMADIN) 3 mg tablet  Self No No   Sig: Take 3 tablets (9 mg total) by mouth daily Takes 9 mg Monday Sat      Facility-Administered Medications: None       Past Medical History:   Diagnosis Date    Abnormal uterine bleeding (AUB)     Anxiety     Arthritis     Chronic kidney disease     Claustrophobia     Diabetes mellitus [facility]     Disease of thyroid gland     DVT (deep venous thrombosis) (HCC)     End stage kidney disease (HCC)     Foot ulcer due to secondary DM ([facility]     Hemodialysis patient ([facility] )     Tues, Thurs, Sat    Hypertension     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[facility] )     right eye    Morbid obesity ([facility]     Osteomyelitis of fifth toe of right foot ([facility]     Panic attacks     Pulmonary embolism (HCC)     Reflux esophagitis     Thrombophlebitis of saphenous vein     Warfarin anticoagulation        Past Surgical History:   Procedure Laterality Date    AMPUTATION      r 4th toe    ARTERIOVENOUS GRAFT PLACEMENT      CATARACT EXTRACTION Bilateral  CERVICAL BIOPSY  W/ LOOP ELECTRODE EXCISION       SECTION      DIALYSIS FISTULA CREATION      DILATION AND CURETTAGE OF UTERUS      ENDOMETRIAL ABLATION W/ NOVASURE      EYE SURGERY      HYSTERECTOMY      @ age 57 (complete)    IR AV FISTULAGRAM/GRAFTOGRAM  10/11/2019    IR AV FISTULAGRAM/GRAFTOGRAM  2020    IR AV FISTULAGRAM/GRAFTOGRAM  2020    OOPHORECTOMY Bilateral     @ age 57    PERICARDIUM SURGERY      WI AMPUTATION TOE,MT-P JT Right 2020    Procedure: AMPUTATION TOE- 5th;  Surgeon: Clint Walters DPM;  Location: [facility];  Service: Podiatry    WI COLONOSCOPY FLX DX W/COLLJ SPEC WHEN PFRMD N/A 3/13/2019    Procedure: COLONOSCOPY;  Surgeon: Pj Ariza MD;  Location: [facility]; Service: Gastroenterology    WI ESOPHAGOGASTRODUODENOSCOPY TRANSORAL DIAGNOSTIC N/A 3/13/2019    Procedure: ESOPHAGOGASTRODUODENOSCOPY (EGD); Surgeon: Pj Ariza MD;  [facility]: [facility]; Service: Gastroenterology    WI LAPAROSCOPY W TOT HYSTERECT UTERUS 250 GRAM OR LESS N/A 2016    Procedure: HYSTERECTOMY LAPAROSCOPIC TOTAL [facility]), with bilateral salpingectomy;  Surgeon: Humera Acevedo DO;  [facility];  Service: Gynecology    THROMBECTOMY / ARTERIOVENOUS GRAFT REVISION      TOE SURGERY Right     removal of the 4th toe       Family History   Problem Relation Age of Onset    Heart disease Family     Diabetes Family     Heart disease Mother     Cancer Brother         neck    Throat cancer Brother     Ovarian cancer Maternal Aunt 36     I have reviewed and agree with the history as documented      E-Cigarette/Vaping    E-Cigarette Use Never User      E-Cigarette/Vaping Substances    Nicotine No     THC No     CBD No     Flavoring No     Other No     Unknown No      Social History     Tobacco Use    Smoking status: Never Smoker    Smokeless tobacco: Never Used   Vaping Use    Vaping Use: Never used   Substance Use Topics    Alcohol use: Never Alcohol/week: 0 0 standard drinks    Drug use: No        Review of Systems   Constitutional: Negative  HENT: Negative  Respiratory: Positive for chest tightness and shortness of breath  Cardiovascular: Negative for chest pain  Gastrointestinal: Positive for nausea and vomiting  Negative for abdominal pain  Genitourinary: Negative  Musculoskeletal: Negative  Skin: Negative  Neurological: Negative  All other systems reviewed and are negative  Physical Exam  ED Triage Vitals [06/27/21 1521]   Temperature Pulse Respirations Blood Pressure SpO2   98 1 °F (36 7 °C) 88 20 117/88 98 %      Temp Source Heart Rate Source Patient Position - Orthostatic VS BP Location FiO2 (%)   Oral Monitor Lying Right arm --      Pain Score       --             Orthostatic Vital Signs  Vitals:    06/27/21 1521 06/27/21 1630   BP: 117/88 122/78   Pulse: 88 73   Patient Position - Orthostatic VS: Lying Lying       Physical Exam  Vitals and nursing note reviewed  Constitutional:       General: She is awake  She is not in acute distress  Appearance: She is not toxic-appearing  Eyes:      General: Vision grossly intact  Gaze aligned appropriately  Cardiovascular:      Rate and Rhythm: Normal rate and regular rhythm  Heart sounds: S1 normal and S2 normal    Pulmonary:      Effort: Pulmonary effort is normal  No respiratory distress  Breath sounds: Normal breath sounds  No wheezing, rhonchi or rales  Abdominal:      Palpations: Abdomen is soft  Tenderness: There is no abdominal tenderness  Skin:     General: Skin is warm and dry  Neurological:      General: No focal deficit present  Mental Status: She is alert and oriented to person, place, and time  Psychiatric:         Mood and Affect: Mood is anxious  Affect is tearful           ED Medications  Medications   LORazepam (ATIVAN) injection 0 5 mg (0 5 mg Intravenous Given 6/27/21 8777)       Diagnostic Studies  Results Reviewed Procedure Component Value Units Date/Time    Troponin I [[id number]]  (Normal) Collected: 06/27/21 1559    Lab Status: Final result Specimen: Blood from Arm, Right Updated: 06/27/21 1628     Troponin I <0 02 ng/mL     Comprehensive metabolic panel [[id number]]  (Abnormal) Collected: 06/27/21 1559    Lab Status: Final result Specimen: Blood from Arm, Right Updated: 06/27/21 1627     Sodium 135 mmol/L      Potassium 5 1 mmol/L      Chloride 100 mmol/L      CO2 26 mmol/L      ANION GAP 9 mmol/L      BUN 45 mg/dL      Creatinine 6 80 mg/dL      Glucose 293 mg/dL      Calcium 8 4 mg/dL      Corrected Calcium 9 3 mg/dL      AST 13 U/L      ALT 23 U/L      Alkaline Phosphatase 198 U/L      Total Protein 7 1 g/dL      Albumin 2 9 g/dL      Total Bilirubin 0 49 mg/dL      eGFR 6 ml/min/1 73sq m     Narrative:      National Kidney Disease Foundation guidelines for Chronic Kidney Disease (CKD):     Stage 1 with normal or high GFR (GFR > 90 mL/min/1 73 square meters)    Stage 2 Mild CKD (GFR = 60-89 mL/min/1 73 square meters)    Stage 3A Moderate CKD (GFR = 45-59 mL/min/1 73 square meters)    Stage 3B Moderate CKD (GFR = 30-44 mL/min/1 73 square meters)    Stage 4 Severe CKD (GFR = 15-29 mL/min/1 73 square meters)    Stage 5 End Stage CKD (GFR <15 mL/min/1 73 square meters)  Note: GFR calculation is accurate only with a steady state creatinine    CBC and differential [[id number]]  (Abnormal) Collected: 06/27/21 1559    Lab Status: Final result Specimen: Blood from Arm, Right Updated: 06/27/21 1609     WBC 8 96 Thousand/uL      RBC 3 01 Million/uL      Hemoglobin 9 3 g/dL      Hematocrit 27 6 %      MCV 92 fL      MCH 30 9 pg      MCHC 33 7 g/dL      RDW 12 9 %      MPV 10 5 fL      Platelets 154 Thousands/uL      nRBC 0 /100 WBCs      Neutrophils Relative 74 %      Immat GRANS % 1 %      Lymphocytes Relative 16 %      Monocytes Relative 7 %      Eosinophils Relative 1 %      Basophils Relative 1 %      Neutrophils Absolute 6 68 Thousands/µL      Immature Grans Absolute 0 07 Thousand/uL      Lymphocytes Absolute 1 45 Thousands/µL      Monocytes Absolute 0 59 Thousand/µL      Eosinophils Absolute 0 11 Thousand/µL      Basophils Absolute 0 06 Thousands/µL                  CT chest wo contrast   Final Result by Luan Amezquita MD (06/27 1803)      New right lower lobe opacity /pneumonia  Minimally improved left upper lobe consolidation /pneumonia  Posttreatment follow-up with unenhanced chest CT recommended in 3 months to ensure resolution and exclude superimposed other etiologies  Workstation performed: [id number]         XR chest 2 views    (Results Pending)         Procedures  ECG 12 Lead Documentation Only    Date/Time: 6/27/2021 3:44 PM  Performed by: Xavi Driver DO  Authorized by: Xavi Driver DO     Indications / Diagnosis:  SOB  ECG reviewed by me, the ED Provider: yes    Patient location:  ED  Previous ECG:     Previous ECG:  Compared to current    Similarity:  No change    Comparison to cardiac monitor: Yes    Interpretation:     Interpretation: normal    Rate:     ECG rate:  88    ECG rate assessment: normal    Rhythm:     Rhythm: sinus rhythm    Ectopy:     Ectopy: none    QRS:     QRS axis:  Normal    QRS intervals:  Normal (94ms)  Conduction:     Conduction: normal    ST segments:     ST segments:  Normal  T waves:     T waves: non-specific    Comments:      Prolonged QT with QTc of 462ms          ED Course                                       MDM  Number of Diagnoses or Management Options  Dyspnea: new and requires workup  Pneumonia: new and requires workup  Diagnosis management comments: 49year old female with ESRD on dialysis presents with dyspnea and chest tightness after being discharged from the hospital yesterday after being treated for pneumonia  Patient afraid that she was discharged too soon  On exam, patient very anxious and tearful  Given ativan for anxiety   Patient currently afebrile with normal vitals, no acute distress  Exam otherwise normal  Given chest discomfort and shortness of breath, patient evaluated with cardiac workup and CXR to evaluate for worsening pneumonia  Patient's lab work unremarkable at this time  EKG negative for acute ischemic changes and troponin negative x1  CXR non-diagnostic secondary to poor inspiratory effort and patient habitus  CT scan of the chest ordered at this time to better evaluate for pneumonia  CT scan showed improvement of previous pneumonia, but did show a new pneumonia in the right lung  Spoke with HARISH attending regarding admission at this time  Patient was not started on antibiotics in the ED as last time antibiotic therapy was guided by infectious disease specialists, and concern that patient may have drug resistance given recent antibiotic use with new pneumonia  Choice of antibiotic coverage was left to admitting provider  Patient agreeable to admission for treatment of pneumonia at this time  Patient admitted in stable condition  Amount and/or Complexity of Data Reviewed  Clinical lab tests: ordered and reviewed  Tests in the radiology section of CPT®: ordered and reviewed  Discuss the patient with other providers: yes    Patient Progress  Patient progress: stable      Disposition  Final diagnoses:   Pneumonia   Dyspnea     Time reflects when diagnosis was documented in both MDM as applicable and the Disposition within this note     Time User Action Codes Description Comment    6/27/2021  6:29 PM Ezekiel Fontana Add [J18 9] Pneumonia     6/27/2021  6:29 PM Ezekiel Fontana Add [R06 00] Dyspnea       ED Disposition     ED Disposition Condition Date/Time Comment    Admit Stable Sun Jun 27, 2021  6:29 PM Case was discussed with HARISH and the patient's admission status was agreed to be Admission Status: inpatient status to the service of Dr Gumaro Nguyen           Follow-up Information    None         Patient's Medications   Discharge Prescriptions No medications on file     No discharge procedures on file  PDMP Review       Value Time User    PDMP Reviewed  Yes 11/25/2020  3:50 PM Juan Jose Ricks, [address]           ED Provider  Attending physically available and evaluated Herber Almanza I managed the patient along with the ED Attending      Electronically Signed by         Raina Bradford DO  07/01/21 1197

## 2021-06-27 NOTE — ASSESSMENT & PLAN NOTE
· Continue Coreg 25 mg BID  · Also on torsemide 100 mg BID  · Nifedipine ER 30 mg daily added by Nephrology with improved BP control

## 2021-06-27 NOTE — ASSESSMENT & PLAN NOTE
Presenting ct chest now revealing new pna on the RLL, mildly improving consolidation on DELMI  Pt did not meet SIRs criteria  Admitted 6/23  Received the following abx vanco and doxycycline x 1 day, Cefepime x 4 days, then discharged on cefedinir (6/26) to complete from 3 more days for a total of 7 days  She did not take any abx at home  Hx of MRSA 6/8  Will place on IV cefepime/vanco  Check sputum cx, urine legionella, strep  Pt is afebrile, non toxic appearing, no leukocytosis  Last procal on 6/25 at 0 26   Check another   Repeat CT chest in 4-8 weeks to ensure complete resolution

## 2021-06-27 NOTE — ASSESSMENT & PLAN NOTE
· Suspect gram-negative bacterial pneumonia in setting of recent hospitalization    · Clinically improved  · Discharge on cefdinir to complete a 7 day course of antibiotics

## 2021-06-27 NOTE — ASSESSMENT & PLAN NOTE
Lab Results   Component Value Date    EGFR 5 06/24/2021    EGFR 6 06/23/2021    EGFR 7 06/10/2021    CREATININE 8 87 (H) 06/24/2021    CREATININE 7 47 (H) 06/23/2021    CREATININE 6 39 (H) 06/10/2021   · On hemodialysis Tuesday, Thursday, Saturday  · Nephrology consulted and appreciate their input    · Discussed with nephrology

## 2021-06-27 NOTE — ASSESSMENT & PLAN NOTE
Lab Results   Component Value Date    HGBA1C 8 4 (H) 06/11/2021     Recent Labs     06/25/21  1610 06/25/21  2223 06/26/21  0616 06/26/21  1249   POCGLU 190* 353* 164* 138     Blood Sugar Average: Last 72 hrs:  (P) 034 [id number]     · Continue glargine insulin 20 units at night  · NovoLog/Humalog sliding scale  Accu-Cheks per protocol

## 2021-06-27 NOTE — ED ATTENDING ATTESTATION
6/27/2021  I, Harmeet Yanes MD, saw and evaluated the patient  I have discussed the patient with the resident/non-physician practitioner and agree with the resident's/non-physician practitioner's findings, Plan of Care, and MDM as documented in the resident's/non-physician practitioner's note, except where noted  All available labs and Radiology studies were reviewed  I was present for key portions of any procedure(s) performed by the resident/non-physician practitioner and I was immediately available to provide assistance  At this point I agree with the current assessment done in the Emergency Department    I have conducted an independent evaluation of this patient a history and physical is as follows:  Pt has esrd recently admitted for pneumonia Pt is not taking oral ab as prescribed Tonight chest  pain sob and feels she can't breathe Pt vomited X2 Pt was discharged yesterday and is concerned that she did not receive antibiotics yesterday Per her discharge antibiotics are every other day because of kidney function PE: heart reg lungs clear abd soft nontender ext nad MDM: will check labs cxr reeval  ED Course         Critical Care Time  Procedures

## 2021-06-27 NOTE — PROGRESS NOTES
[address] [facility]  Progress Note Monalisa León 1965, 49 y o  female MRN: [id number]  Unit/Bed#: -01 Encounter: [id number]  Primary Care Provider: Marlo Maurer MD   Date and time admitted to hospital: 6/23/2021 12:10 PM    Type 2 diabetes mellitus [facility])  Assessment & Plan  Lab Results   Component Value Date    HGBA1C 8 4 (H) 06/11/2021     Recent Labs     06/25/21  1610 06/25/21  2223 06/26/21  0616 06/26/21  1249   POCGLU 190* 353* 164* 138     Blood Sugar Average: Last 72 hrs:  (P) 201 [id number]     · Continue glargine insulin 20 units at night  · NovoLog/Humalog sliding scale  Accu-Cheks per protocol  ESRD on hemodialysis [facility])  Assessment & Plan  Lab Results   Component Value Date    EGFR 5 06/24/2021    EGFR 6 06/23/2021    EGFR 7 06/10/2021    CREATININE 8 87 (H) 06/24/2021    CREATININE 7 47 (H) 06/23/2021    CREATININE 6 39 (H) 06/10/2021   · On hemodialysis Tuesday, Thursday, Saturday  · Nephrology consulted and appreciate their input  · Discussed with nephrology    History of DVT (deep vein thrombosis)  Assessment & Plan  · Currently on warfarin at 9 mg daily Monday-Saturday and 12 mg on Sunday  Essential hypertension  Assessment & Plan  · Continue Coreg 25 mg BID  · Also on torsemide 100 mg BID  · Nifedipine ER 30 mg daily added by Nephrology with improved BP control    * Healthcare-associated pneumonia  Assessment & Plan  · Suspect gram-negative bacterial pneumonia in setting of recent hospitalization  · Clinically improved  · Discharge on cefdinir to complete a 7 day course of antibiotics      Patient Centered Rounds: I performed bedside rounds with nursing staff today  Discussions with Specialists or Other Care Team Provider:  Discussed with Nephrology    Education and Discussions with Family / Patient: Patient declined call to   Time Spent for Care: 20 minutes   More than 50% of total time spent on counseling and coordination of care as described above  Current Length of Stay: 3 day(s)  Current Patient Status: Inpatient     Discharge Plan: Discharge home today    Subjective:   Occasional cough  No shortness of breath or wheezing  No fever    Objective:     Vitals:   Temp (24hrs), Av 1 °F (36 7 °C), Min:97 9 °F (36 6 °C), Max:98 3 °F (36 8 °C)    Temp:  [97 9 °F (36 6 °C)-98 3 °F (36 8 °C)] 98 1 °F (36 7 °C)  HR:  [] 106  Resp:  [18] 18  BP: (120-219)/() 134/98  Body mass index is 44 48 kg/m²  Physical Exam:   Physical Exam  Vitals reviewed  HENT:      Head: Normocephalic  Nose: Nose normal    Eyes:      Extraocular Movements: Extraocular movements intact  Cardiovascular:      Rate and Rhythm: Normal rate and regular rhythm  Pulmonary:      Effort: Pulmonary effort is normal       Breath sounds: Normal breath sounds  Abdominal:      General: Bowel sounds are normal       Palpations: Abdomen is soft  Musculoskeletal:         General: No swelling  Cervical back: Neck supple  Skin:     General: Skin is warm and dry  Neurological:      General: No focal deficit present  Mental Status: She is alert and oriented to person, place, and time     Psychiatric:         Mood and Affect: Mood normal          Behavior: Behavior normal           Additional Data:     Labs:  Results from last 7 days   Lab Units 21  0457   WBC Thousand/uL 8 59   HEMOGLOBIN g/dL 8 4*   HEMATOCRIT % 25 7*   PLATELETS Thousands/uL 198   NEUTROS PCT % 87*   LYMPHS PCT % 6*   MONOS PCT % 6   EOS PCT % 0     Results from last 7 days   Lab Units 21  0456   SODIUM mmol/L 129*   POTASSIUM mmol/L 5 0   CHLORIDE mmol/L 94*   CO2 mmol/L 25   BUN mg/dL 66*   CREATININE mg/dL 8 87*   ANION GAP mmol/L 10   CALCIUM mg/dL 7 7*   ALBUMIN g/dL 2 6*   TOTAL BILIRUBIN mg/dL 0 38   ALK PHOS U/L 153*   ALT U/L 19   AST U/L 10   GLUCOSE RANDOM mg/dL 560*     Results from last 7 days   Lab Units 21  0456   INR 1 92*     Results from last 7 days   Lab Units 06/26/21  1249 06/26/21  0616 06/25/21  2223 06/25/21  1610 06/25/21  1048 06/25/21  0603 06/25/21  0103 06/24/21  2107 06/24/21  1619 06/24/21  1326 06/24/21  0756   POC GLUCOSE mg/dl 138 164* 353* 190* 167* 191* 238* 169* 258* 138 484*         Results from last 7 days   Lab Units 06/25/21  0514 06/24/21  0456   PROCALCITONIN ng/ml 0 26* 0 27*       Lines/Drains:  Invasive Devices     Line            Hemodialysis AV Fistula 02/20/18 Left Forearm 1222 days              Today, Patient Was Seen By: Dona Falk MD    **Please Note: This note may have been constructed using a voice recognition system  **

## 2021-06-27 NOTE — DISCHARGE SUMMARY
Discharging Physician / Practitioner: Isela Bacon MD  PCP: Michelle Michael MD  Admission Date:   Admission Orders (From admission, onward)     Ordered        06/23/21 2247  Inpatient Admission  Once                   Discharge Date: 06/26/21     Principal discharge diagnosis:  Healthcare associated pneumonia    Secondary diagnoses:  1  Type 2 diabetes  2  ESRD on hemodialysis  3  Anxiety  4  History of DVT  5  Hypertension    Consultations During Hospital Stay:  Nephrology    Procedures Performed:   1  Chest x-ray - Patchy consolidation in the left lung suspicious for pneumonia  2  CTA chest PE study - No evidence of pulmonary embolism given limitations    Consolidation in the left upper lobe anteriorly and posterior medially could relate to pneumonia /infection  Posttreatment follow-up with unenhanced chest CT recommended in 3 months to exclude other etiologies  Outpatient Tests Requested:  CT chest without contrast in 3 months    Hospital Course:   Rosalva Segura is a 49 y o  female patient who originally presented to the hospital on 6/23/2021 due to cough and fever  She was found to have pneumonia and clinically improved with cefepime  She is being discharged on cefdinir complete a 7 day course of antibiotics  Repeat CT chest has been recommended in 3 months to exclude other etiologies for findings noted  Nifedipine ER 30 mg daily was added to her hypertensive regimen by Nephrology  Condition at Discharge: stable    Discharge Day Visit / Exam:   * Please refer to separate progress note for these details *    Discussion with Family: Patient declined call to   Discharge instructions/Information to patient and family:   See after visit summary for information provided to patient and family  Provisions for Follow-Up Care:  See after visit summary for information related to follow-up care and any pertinent home health orders         Disposition:   Home    Planned Readmission: No     Discharge Statement:  I spent 30 minutes discharging the patient  This time was spent on the day of discharge  I had direct contact with the patient on the day of discharge  Greater than 50% of the total time was spent examining patient, answering all patient questions, arranging and discussing plan of care with patient as well as directly providing post-discharge instructions  Additional time then spent on discharge activities  Discharge Medications:  See after visit summary for reconciled discharge medications provided to patient and/or family        **Please Note: This note may have been constructed using a voice recognition system**

## 2021-06-27 NOTE — ASSESSMENT & PLAN NOTE
Rolando Tapia is a 42 year old ambidextrous white male  at [facility] in [place] who returns for followup approximately 4 months status post left shoulder arthroscopy with Bankart repair and loose body removal.  He has performed all of his own physical therapy, and states things are going well.  He notes a little ongoing aching and stiffness, but feels these are improving.  He cannot take anti-inflammatories due to reflux disease.  He has recently applied for new job as a  with [employer].    Past Medical History:   Diagnosis Date   • Chronic pain    • Dislocated shoulder 07/04/2018    Multiple dislocations following initial dislocation, LEFT shoulder   • Hyponatremia 11/2013   • Marijuana abuse 1/6/2020   • MRSA infection greater than 3 months ago 11/2013    MRSA positive spinal epidural abscess   • Spinal epidural abscess 11/13/2013    Spinal epidural abscess from C2-T10 +MRSA, + citrobacter, + rare yeast       Past Surgical History:   Procedure Laterality Date   • Laminectomy  11/13/2013    s/p C5-6 and T5, T6 and T7 for epidural abscess evacuation and drainage   • Shoulder arthroscopy w/ bankhart procedure Left 06/04/2020    +loose body removal, Dr. Cabello       Current Outpatient Medications   Medication Sig Dispense Refill   • DULoxetine (CYMBALTA) 30 MG capsule Take 1 capsule by mouth daily. 30 capsule 2   • famotidine (PEPCID) 20 MG tablet Take 2 tablets by mouth nightly. 60 tablet 0   • meloxicam (MOBIC) 15 MG tablet TAKE 1 TABLET BY MOUTH DAILY AS NEEDED FOR PAIN 30 tablet 3   • mirtazapine (REMERON) 15 MG tablet Take 1 tablet by mouth nightly. 14 tablet 0   • zolpidem (AMBIEN) 5 MG tablet Take 2 tablets by mouth at bedtime as needed for Sleep. 60 tablet 0   • omeprazole (PRILOSEC) 20 MG capsule TAKE 1 CAPSULE BY MOUTH DAILY BEFORE BREAKFAST 90 capsule 0   • hydrOXYzine (ATARAX) 50 MG tablet TAKE 1 TABLET BY MOUTH EVERY 4 HOURS AS NEEDED FOR ANXIETY 120 tablet 0   •  · Currently on warfarin at 9 mg daily Monday-Saturday and 12 mg on Sunday  ketoconazole (NIZORAL) 2 % shampoo Use 2 times weekly for 4 weeks .  Lather, Leave in hair for 5 minutes prior to rinsing off.  Repeat as needed 120 mL 2   • fluocinolone acetonide body (FLUOCINOLONE) 0.01 % external oil Apply to affected areas of the scalp once daily at bedtime, rinse off in the morning.  Use for up to 2 weeks 118.28 mL 0   • ketoconazole (NIZORAL) 2 % cream Apply topically 2 times daily to affected areas of face for up to 6 weeks, repeat as needed. 30 g 2   • nalTREXone (VIVITROL) 380 MG injection Inject 380 mg into the muscle every 28 days. 380 mg 3   • sildenafil (VIAGRA) 100 MG tablet Take 100 mg by mouth every 24 hours as needed.       No current facility-administered medications for this visit.      ALLERGIES:   Allergen Reactions   • Azithromycin RASH and Other (See Comments)     \"high\" fever   • Nut - Multiple   (Food) ANAPHYLAXIS     Jossy nuts   • Nuts   (Food) THROAT SWELLING   • Gadolinium RASH     Pt reports he developed a generalized hive like rash after MRI contrast.      I have reviewed the patient's medications and allergies, past medical, surgical, social and family history, updating these as appropriate.  See Histories section of the EMR for a display of this information.    Examination of the shoulder reveals active range of motion 0-160° of flexion and abduction, 60° of external rotation, and internal rotation to L4, with some discomfort in all directions. Motor testing reveals 5-/5 strength about the rotator cuff and shoulder girdle, also with some discomfort in all directions. There is a grossly intact neurological examination distally in the operative extremity.    Assessment/Plan: Doing well.  He may now return to work in progress activity without restrictions.  He will return to my clinic in 4 months for will hopefully be his final evaluation if all is well.

## 2021-06-27 NOTE — ASSESSMENT & PLAN NOTE
Lab Results   Component Value Date    EGFR 6 06/27/2021    EGFR 5 06/24/2021    EGFR 6 06/23/2021    CREATININE 6 80 (H) 06/27/2021    CREATININE 8 87 (H) 06/24/2021    CREATININE 7 47 (H) 06/23/2021   On HD T/Th/Sat  Nephrology consulted

## 2021-06-27 NOTE — H&P
[facility]  H&P- Da Abreu 1965, 49 y o  female MRN: [id number]  Unit/Bed#: CRISTINO Encounter: [id number]  Primary Care Provider: Alejo Thapa MD   Date and time admitted to hospital: 6/27/2021  3:20 PM    * Healthcare associated bacterial pneumonia  Assessment & Plan  Presenting ct chest now revealing new pna on the RLL, mildly improving consolidation on DELMI  Pt did not meet SIRs criteria  Admitted 6/23  Received the following abx vanco and doxycycline x 1 day, Cefepime x 4 days, then discharged on cefedinir (6/26) to complete from 3 more days for a total of 7 days  She did not take any abx at home  Hx of MRSA 6/8  Will place on IV cefepime/vanco  Check sputum cx, urine legionella, strep  Pt is afebrile, non toxic appearing, no leukocytosis  Last procal on 6/25 at 0 26  Check another   Repeat CT chest in 4-8 weeks to ensure complete resolution    ESRD on hemodialysis [facility])  Assessment & Plan  Lab Results   Component Value Date    EGFR 6 06/27/2021    EGFR 5 06/24/2021    EGFR 6 06/23/2021    CREATININE 6 80 (H) 06/27/2021    CREATININE 8 87 (H) 06/24/2021    CREATININE 7 47 (H) 06/23/2021   On HD T/Th/Sat  Nephrology consulted    Type 2 diabetes mellitus [facility])  Assessment & Plan  Lab Results   Component Value Date    HGBA1C 8 4 (H) 06/11/2021       Recent Labs     06/25/21  1610 06/25/21  2223 06/26/21  0616 06/26/21  1249   POCGLU 190* 353* 164* 138       Blood Sugar Average: Last 72 hrs:  Cont insulin regimen as outpatient   Added ISS    Chronic anticoagulation  Assessment & Plan  Secondary to hx of DVT  Awaiting PT/INR to dose coumadin accordingly    Hypertension  Assessment & Plan  Controlled, cont meds    Hypothyroidism  Assessment & Plan  Cont levothyroxine    Morbid obesity ([address] )  Assessment & Plan  Lifestyle/dietary modification    VTE Prophylaxis: Warfarin (Coumadin)  / sequential compression device   Code Status: Full code  POLST: There is no POLST form on file for this patient (pre-hospital)  Discussion with family: Plan of care discussed with patient at length    Anticipated Length of Stay:  Patient will be admitted on an Inpatient basis with an anticipated length of stay of  > 2 midnights  Justification for Hospital Stay:     Total Time for Visit, including Counseling / Coordination of Care: 60 minutes  Greater than 50% of this total time spent on direct patient counseling and coordination of care  Chief Complaint:   Cough, generalized weakness    History of Present Illness:    Ronnie Calderón is a 49 y o  female medical hx significant for HTN, ESRD on HD T/TH/Sat, DVT presents to the ED with c/o of cough, sob, generalized weakness x 1 day  Pt was recently admitted from 6/23-6/26 and treated for HCAP  She was discharged on cefdinir  She reports today had continued cough, productive of greenish sputum, intermittent sob, subjective fever and chills  She did not take dose of cefdinir today  Upon presentation to the ED, ct chest was done revealing new consolidation on RLL  POX is stable on RM  Pt did not meet SIRs criteria  Review of Systems:    Review of Systems   Constitutional: Positive for fatigue and fever  Respiratory: Positive for cough and shortness of breath  All other systems reviewed and are negative        Past Medical and Surgical History:     Past Medical History:   Diagnosis Date    Abnormal uterine bleeding (AUB)     Anxiety     Arthritis     Chronic kidney disease     Claustrophobia     Diabetes mellitus ([facility] )     Disease of thyroid gland     DVT (deep venous thrombosis) (HCC)     End stage kidney disease (HCC)     Foot ulcer due to secondary DM ([facility] )     Hemodialysis patient ([facility] )     Tues, Thurs, Sat    Hypertension     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[facility] )     right eye    Morbid obesity (HCC)     Osteomyelitis of fifth toe of right foot (HCC)     Panic attacks     Pulmonary embolism (HCC)     Reflux esophagitis  Thrombophlebitis of saphenous vein     Warfarin anticoagulation        Past Surgical History:   Procedure Laterality Date    AMPUTATION      r 4th toe    ARTERIOVENOUS GRAFT PLACEMENT      CATARACT EXTRACTION Bilateral     CERVICAL BIOPSY  W/ LOOP ELECTRODE EXCISION       SECTION      DIALYSIS FISTULA CREATION      DILATION AND CURETTAGE OF UTERUS      ENDOMETRIAL ABLATION W/ NOVASURE      EYE SURGERY      HYSTERECTOMY      @ age 55 (complete)    IR AV FISTULAGRAM/GRAFTOGRAM  10/11/2019    IR AV FISTULAGRAM/GRAFTOGRAM  2020    IR AV FISTULAGRAM/GRAFTOGRAM  2020    OOPHORECTOMY Bilateral     @ age 55    PERICARDIUM SURGERY      CT AMPUTATION TOE,MT-P JT Right 2020    Procedure: AMPUTATION TOE- 5th;  Surgeon: Gigi Strickland DPM;  Location: [facility];  Service: Podiatry    CT COLONOSCOPY FLX DX W/COLLJ SPEC WHEN PFRMD N/A 3/13/2019    Procedure: COLONOSCOPY;  Surgeon: Debbie Phelan MD;  Location: [facility]; Service: Gastroenterology    CT ESOPHAGOGASTRODUODENOSCOPY TRANSORAL DIAGNOSTIC N/A 3/13/2019    Procedure: ESOPHAGOGASTRODUODENOSCOPY (EGD); Surgeon: Debbie Phelan MD;  Location: [facility]; Service: Gastroenterology    CT LAPAROSCOPY W TOT HYSTERECT UTERUS 250 GRAM OR LESS N/A 2016    Procedure: HYSTERECTOMY LAPAROSCOPIC TOTAL [facility]), with bilateral salpingectomy;  Surgeon: Morenita Francisco DO;  [facility];  Service: Gynecology    THROMBECTOMY / ARTERIOVENOUS GRAFT REVISION      TOE SURGERY Right     removal of the 4th toe       Meds/Allergies:    Prior to Admission medications    Medication Sig Start Date End Date Taking?  Authorizing Provider   Accu-Chek Softclix Lancets lancets 3 (three) times a day Use to test blood sugar 20   Historical Provider, MD   acetaminophen (TYLENOL) 325 mg tablet Take 2 tablets (650 mg total) by mouth every 6 (six) hours as needed for mild pain or fever 20   Aj Mckeon MD   albuterol (ProAir HFA) 90 mcg/act inhaler Inhale 2 puffs every 6 (six) hours as needed for wheezing or shortness of breath 6/11/21   LOIS Robison   ALPRAZolam Marsha Tineo) 0 25 mg tablet Take 0 25 mg by mouth 2 (two) times a day as needed for anxiety    Historical Provider, MD   atorvastatin (LIPITOR) 20 mg tablet Take 20 mg by mouth every evening  4/24/18   Historical Provider, MD   atropine (ISOPTO ATROPINE) 1 % ophthalmic solution Administer 1 drop to both eyes daily at bedtime  2/4/19   Historical Provider, MD   benzonatate (TESSALON PERLES) 100 mg capsule Take 1 capsule (100 mg total) by mouth 3 (three) times a day as needed for cough 6/11/21   LOIS Robison   brimonidine (ALPHAGAN P) 0 15 % ophthalmic solution Administer 1 drop to both eyes 2 (two) times a day     Historical Provider, MD   CALCIUM CARBONATE PO Take 1,000 mg by mouth daily      Historical Provider, MD   carvedilol (COREG) 25 mg tablet Take 25 mg by mouth 2 (two) times a day with meals      Historical Provider, MD   cefdinir (OMNICEF) 300 mg capsule Take 1 capsule (300 mg total) by mouth every other day for 3 doses 6/26/21 7/1/21  Jacinta Murray MD   cinacalcet (SENSIPAR) 30 mg tablet Take 30 mg by mouth daily    Historical Provider, MD   diclofenac sodium (VOLTAREN) 1 % Apply 2 g topically 4 (four) times a day 5/19/19   Kevin Lindsay MD   diphenhydrAMINE (BENADRYL) 25 mg capsule Take by mouth as needed for itching      Historical Provider, MD   gabapentin (NEURONTIN) 100 mg capsule Take 3 capsules (300 mg total) by mouth daily at bedtime 2/21/20   LOIS Mariano   guaiFENesin (MUCINEX) 600 mg 12 hr tablet Take 1 tablet (600 mg total) by mouth 2 (two) times a day for 5 days 6/26/21 7/1/21  Jacinta Murray MD   insulin glargine (LANTUS) 100 units/mL subcutaneous injection Inject 20 Units under the skin daily at bedtime 6/11/21   LOIS Robison   KIONEX 15 GM/60ML suspension Take by mouth Takes as a shake on dialysis days Tues-Thurs_Sat 12/10/18   Historical Provider, MD   levothyroxine 50 mcg tablet Take 50 mcg by mouth daily    Historical Provider, MD   lidocaine (LIDODERM) 5 % APPLY 1 PATCH BY TRANDERMAL ROUTE EVERY DAY (MAY WEAR UP TO 12 HOURS)   9/15/20   Historical Provider, MD   loratadine (CLARITIN) 10 mg tablet Take 10 mg by mouth daily    Historical Provider, MD   NIFEdipine (ADALAT CC) 30 MG 24 hr tablet Take 1 tablet (30 mg total) by mouth daily 6/27/21   Nena Bermudez MD   NOVOLOG FLEXPEN 100 units/mL SOPN INJECT 1 TO 5 UNITS SUBCUTANEOUSLY THREE TIMES A DAY BEFORE MELAS AS PER SLIDING SCALE 4/30/20   Historical Provider, MD   nystatin (MYCOSTATIN) powder Apply topically 2 (two) times a day 8/31/19   Melvin Cook MD   ondansetron [facility]) 4 mg tablet Take 1 tablet (4 mg total) by mouth every 8 (eight) hours as needed for nausea or vomiting 1/22/19   Delbert Mason MD   pantoprazole (PROTONIX) 40 mg tablet Take 1 tablet (40 mg total) by mouth daily in the early morning 2/21/20   Stephanie Vigil, [facility]) OINT Apply topically once for 1 dose 3/31/21 3/31/21  Suresh Mcdaniel DPM   prednisoLONE acetate (PRED FORTE) 1 % ophthalmic suspension Administer 1 drop to both eyes 4 (four) times a day Patient takes only occasionally    Historical Provider, MD   senna (SENOKOT) 8 6 mg Take 2 tablets (17 2 mg total) by mouth daily at bedtime as needed for constipation 2/21/20   LOIS Lockett   sertraline (ZOLOFT) 50 mg tablet Take 1 tablet (50 mg total) by mouth daily 4/28/19   LOIS Okeefe   sevelamer (RENAGEL) 800 mg tablet Take 1 tablet (800 mg total) by mouth 3 (three) times a day with meals 6/11/21   LOIS Faustin   sodium chloride (OCEAN) 0 65 % nasal spray 1 spray into each nostril 3 (three) times a day as needed for congestion 6/11/21   LOIS Faustin   torsemide [facility]) 100 mg tablet Take 100 mg by mouth every 12 (twelve) hours  1/2/19   Historical Provider, MD   warfarin (COUMADIN) 3 mg tablet Take 3 tablets (9 mg total) by mouth daily Takes 9 mg Monday Sat 6/11/21   LOIS Bloom     I have reviewed home medications using allscripts  Allergies: Allergies   Allergen Reactions    Iodinated Diagnostic Agents Itching       Social History:     Marital Status: Single   Occupation:   Patient Pre-hospital Living Situation: Resides at home  Patient Pre-hospital Level of Mobility: Independent  Patient Pre-hospital Diet Restrictions: none  Substance Use History:   Social History     Substance and Sexual Activity   Alcohol Use Never    Alcohol/week: 0 0 standard drinks     Social History     Tobacco Use   Smoking Status Never Smoker   Smokeless Tobacco Never Used     Social History     Substance and Sexual Activity   Drug Use No       Family History:    non-contributory    Physical Exam:     Vitals:   Blood Pressure: 124/78 (06/27/21 1815)  Pulse: 72 (06/27/21 1815)  Temperature: 98 1 °F (36 7 °C) (06/27/21 1521)  Temp Source: Oral (06/27/21 1521)  Respirations: 20 (06/27/21 1815)  Weight - Scale: 125 kg (275 lb) (06/27/21 1521)  SpO2: 94 % (06/27/21 1815)    Physical Exam  Constitutional:       Appearance: She is obese  Cardiovascular:      Rate and Rhythm: Normal rate and regular rhythm  Pulses: Normal pulses  Heart sounds: Normal heart sounds  Pulmonary:      Effort: Pulmonary effort is normal  No respiratory distress  Breath sounds: No wheezing or rales  Comments: Diminished bs    Abdominal:      General: Abdomen is flat  Bowel sounds are normal  There is no distension  Palpations: Abdomen is soft  Tenderness: There is no abdominal tenderness  There is no guarding  Musculoskeletal:         General: Normal range of motion  Cervical back: Normal range of motion and neck supple  Right lower leg: No edema  Left lower leg: No edema  Skin:     General: Skin is warm and dry     Neurological:      General: No focal deficit present  Mental Status: She is alert and oriented to person, place, and time  Mental status is at baseline  Cranial Nerves: No cranial nerve deficit  Motor: No weakness  Additional Data:     Lab Results: I have personally reviewed pertinent reports  Results from last 7 days   Lab Units 06/27/21  1559   WBC Thousand/uL 8 96   HEMOGLOBIN g/dL 9 3*   HEMATOCRIT % 27 6*   PLATELETS Thousands/uL 228   NEUTROS PCT % 74   LYMPHS PCT % 16   MONOS PCT % 7   EOS PCT % 1     Results from last 7 days   Lab Units 06/27/21  1559   SODIUM mmol/L 135*   POTASSIUM mmol/L 5 1   CHLORIDE mmol/L 100   CO2 mmol/L 26   BUN mg/dL 45*   CREATININE mg/dL 6 80*   ANION GAP mmol/L 9   CALCIUM mg/dL 8 4   ALBUMIN g/dL 2 9*   TOTAL BILIRUBIN mg/dL 0 49   ALK PHOS U/L 198*   ALT U/L 23   AST U/L 13   GLUCOSE RANDOM mg/dL 293*     Results from last 7 days   Lab Units 06/24/21  0456   INR  1 92*     Results from last 7 days   Lab Units 06/26/21  1249 06/26/21  0616 06/25/21  2223 06/25/21  1610 06/25/21  1048 06/25/21  0603 06/25/21  0103 06/24/21  2107 06/24/21  1619 06/24/21  1326 06/24/21  0756   POC GLUCOSE mg/dl 138 164* 353* 190* 167* 191* 238* 169* 258* 138 484*         Results from last 7 days   Lab Units 06/25/21  0514 06/24/21  0456   PROCALCITONIN ng/ml 0 26* 0 27*       Imaging: I have personally reviewed pertinent reports  CT chest wo contrast   Final Result by Jennifer Beckett MD (06/27 1803)      New right lower lobe opacity /pneumonia  Minimally improved left upper lobe consolidation /pneumonia  Posttreatment follow-up with unenhanced chest CT recommended in 3 months to ensure resolution and exclude superimposed other etiologies                 Workstation performed: [id number]         XR chest 2 views    (Results Pending)       EKG, Pathology, and Other Studies Reviewed on Admission:   · EKG:     Allscripts / Epic Records Reviewed: Yes     ** Please Note: This note has been constructed using a voice recognition system   **

## 2021-06-27 NOTE — ASSESSMENT & PLAN NOTE
Lab Results   Component Value Date    HGBA1C 8 4 (H) 06/11/2021       Recent Labs     06/25/21  1610 06/25/21  2223 06/26/21  0616 06/26/21  1249   POCGLU 190* 353* 164* 138       Blood Sugar Average: Last 72 hrs:  Cont insulin regimen as outpatient   Added ISS

## 2021-06-27 NOTE — PROGRESS NOTES
Vancomycin IV Pharmacy-to-Dose Consultation    Ronnie Calderón is a 49 y o  female who is currently receiving Vancomycin IV with management by the Pharmacy Consult service  Pt has HX of Type II diabetes, ESRD on HD, Hx of DVT & HTN  Relevant clinical data and objective history reviewed:  Temp Readings from Last 3 Encounters:   06/27/21 98 1 °F (36 7 °C) (Oral)   06/26/21 98 1 °F (36 7 °C) (Oral)   06/23/21 98 6 °F (37 °C) (Tympanic)     /78 (BP Location: Right arm)   Pulse 72   Temp 98 1 °F (36 7 °C) (Oral)   Resp 20   Wt 125 kg (275 lb)   LMP 02/12/2016 (Exact Date)   SpO2 94%   BMI 44 39 kg/m²     No intake/output data recorded  Lab Results   Component Value Date/Time    BUN 45 (H) 06/27/2021 03:59 PM    BUN 64 (H) 10/11/2015 06:08 AM    WBC 8 96 06/27/2021 03:59 PM    WBC 8 45 10/11/2015 06:14 AM    HGB 9 3 (L) 06/27/2021 03:59 PM    HGB 8 7 (L) 10/11/2015 06:14 AM    HCT 27 6 (L) 06/27/2021 03:59 PM    HCT 26 3 (L) 10/11/2015 06:14 AM    MCV 92 06/27/2021 03:59 PM    MCV 91 10/11/2015 06:14 AM     06/27/2021 03:59 PM     10/11/2015 06:14 AM     Creatinine   Date Value Ref Range Status   06/27/2021 6 80 (H) 0 60 - 1 30 mg/dL Final     Comment:     Standardized to IDMS reference method   06/24/2021 8 87 (H) 0 60 - 1 30 mg/dL Final     Comment:     Standardized to IDMS reference method   10/11/2015 6 68 (H) 0 60 - 1 30 mg/dL Final     Comment:     Standardized to IDMS reference method   10/08/2015 9 09 (H) 0 60 - 1 30 mg/dL Final     Comment:     Standardized to IDMS reference method     VANCOMYCIN TROUGH   Date Value Ref Range Status   05/25/2014 32 1 (HH) 10 0 - 20 0 mcg/mL Final     Comment:     'Results called and read back:  [facility]/SFR 2035  Verify that this specimen was collected immediately prior to drug  administration  Reference range and result flagging reflect proper  specimen collection protocol    The above 1 analytes were performed by [facility]   Date Value Ref Range Status   05/26/2014 30 1 mcg/mL Final     Comment:     The above 1 analytes were performed by Larry  [address] [place] [address],[place] [address]       Vancomycin    Date Value Ref Range Status   08/31/2019 20 1 ug/mL Final   08/29/2019 20 7 ug/mL Final         Vancomycin Assessment:  Indication: Pneumonia      Renal Function: CrCl = 12 8, Hemo day Tues, Thur & Sat  Cr = 6 8  Potential Nephrotoxicity Factors:  Medications: none identified at this time                                               Patient-Factors: elderly, renal dysfunction, obese  Days of Therapy: 1   Current Dose:  1750 mg load then 750 mg PHD on Tues, Thur Sat  Random level with 3rd HD on Sat 7/3 0600  Hold any dose starting with 3rd dose if random level >20  Goal Trough: 15-20 (appropriate for most indications)   Goal AUC(24h): 400-600  Last Level:  none  Pharmacokinetic Analysis: Mariely Christine is a 49 y o  female patient who originally presented to the hospital on 6/23/2021 due to cough and fever  She was found to have pneumonia and clinically improved with cefepime  She was discharged on cefdinir to complete a 7 day course of antibiotics and readmitted 6/27  Pharmacy will continue to follow closely for s/sx of nephrotoxicity, infusion reactions and appropriateness of therapy  BMP and CBC will be ordered per protocol  We will continue to follow the patient's culture results and clinical progress daily  León HONG Ph , Pharmacist, Extension [phone]

## 2021-06-28 PROBLEM — J18.9 PNEUMONIA: Status: ACTIVE | Noted: 2021-06-27

## 2021-06-28 LAB
ANION GAP SERPL CALCULATED.3IONS-SCNC: 8 MMOL/L (ref 4–13)
APTT PPP: 37 SECONDS (ref 23–37)
APTT PPP: >210 SECONDS (ref 23–37)
BUN SERPL-MCNC: 57 MG/DL (ref 5–25)
CALCIUM SERPL-MCNC: 8.7 MG/DL (ref 8.3–10.1)
CHLORIDE SERPL-SCNC: 102 MMOL/L (ref 100–108)
CO2 SERPL-SCNC: 27 MMOL/L (ref 21–32)
CREAT SERPL-MCNC: 7.57 MG/DL (ref 0.6–1.3)
ERYTHROCYTE [DISTWIDTH] IN BLOOD BY AUTOMATED COUNT: 13 % (ref 11.6–15.1)
GFR SERPL CREATININE-BSD FRML MDRD: 6 ML/MIN/1.73SQ M
GLUCOSE SERPL-MCNC: 175 MG/DL (ref 65–140)
GLUCOSE SERPL-MCNC: 193 MG/DL (ref 65–140)
GLUCOSE SERPL-MCNC: 227 MG/DL (ref 65–140)
GLUCOSE SERPL-MCNC: 231 MG/DL (ref 65–140)
GLUCOSE SERPL-MCNC: 232 MG/DL (ref 65–140)
HCT VFR BLD AUTO: 26.9 % (ref 34.8–46.1)
HGB BLD-MCNC: 8.6 G/DL (ref 11.5–15.4)
INR PPP: 1.36 (ref 0.84–1.19)
MAGNESIUM SERPL-MCNC: 2.2 MG/DL (ref 1.6–2.6)
MCH RBC QN AUTO: 30.3 PG (ref 26.8–34.3)
MCHC RBC AUTO-ENTMCNC: 32 G/DL (ref 31.4–37.4)
MCV RBC AUTO: 95 FL (ref 82–98)
PLATELET # BLD AUTO: 191 THOUSANDS/UL (ref 149–390)
PMV BLD AUTO: 10.7 FL (ref 8.9–12.7)
POTASSIUM SERPL-SCNC: 4.8 MMOL/L (ref 3.5–5.3)
PROCALCITONIN SERPL-MCNC: 0.17 NG/ML
PROTHROMBIN TIME: 16.8 SECONDS (ref 11.6–14.5)
RBC # BLD AUTO: 2.84 MILLION/UL (ref 3.81–5.12)
SODIUM SERPL-SCNC: 137 MMOL/L (ref 136–145)
TROPONIN I SERPL-MCNC: <0.02 NG/ML
WBC # BLD AUTO: 7.75 THOUSAND/UL (ref 4.31–10.16)

## 2021-06-28 PROCEDURE — 99222 1ST HOSP IP/OBS MODERATE 55: CPT | Performed by: INTERNAL MEDICINE

## 2021-06-28 PROCEDURE — 80048 BASIC METABOLIC PNL TOTAL CA: CPT | Performed by: INTERNAL MEDICINE

## 2021-06-28 PROCEDURE — 87070 CULTURE OTHR SPECIMN AEROBIC: CPT | Performed by: INTERNAL MEDICINE

## 2021-06-28 PROCEDURE — 99232 SBSQ HOSP IP/OBS MODERATE 35: CPT | Performed by: INTERNAL MEDICINE

## 2021-06-28 PROCEDURE — 83735 ASSAY OF MAGNESIUM: CPT | Performed by: INTERNAL MEDICINE

## 2021-06-28 PROCEDURE — 85027 COMPLETE CBC AUTOMATED: CPT | Performed by: INTERNAL MEDICINE

## 2021-06-28 PROCEDURE — 84484 ASSAY OF TROPONIN QUANT: CPT | Performed by: INTERNAL MEDICINE

## 2021-06-28 PROCEDURE — 87205 SMEAR GRAM STAIN: CPT | Performed by: INTERNAL MEDICINE

## 2021-06-28 PROCEDURE — 84145 PROCALCITONIN (PCT): CPT | Performed by: INTERNAL MEDICINE

## 2021-06-28 PROCEDURE — 82948 REAGENT STRIP/BLOOD GLUCOSE: CPT

## 2021-06-28 PROCEDURE — NC001 PR NO CHARGE: Performed by: INTERNAL MEDICINE

## 2021-06-28 PROCEDURE — 85730 THROMBOPLASTIN TIME PARTIAL: CPT | Performed by: INTERNAL MEDICINE

## 2021-06-28 PROCEDURE — 85610 PROTHROMBIN TIME: CPT | Performed by: INTERNAL MEDICINE

## 2021-06-28 RX ORDER — HEPARIN SODIUM 10000 [USP'U]/100ML
3-30 INJECTION, SOLUTION INTRAVENOUS
Status: DISCONTINUED | OUTPATIENT
Start: 2021-06-28 | End: 2021-07-03

## 2021-06-28 RX ORDER — HEPARIN SODIUM 1000 [USP'U]/ML
10000 INJECTION, SOLUTION INTRAVENOUS; SUBCUTANEOUS ONCE
Status: COMPLETED | OUTPATIENT
Start: 2021-06-28 | End: 2021-06-28

## 2021-06-28 RX ORDER — HEPARIN SODIUM 1000 [USP'U]/ML
5000 INJECTION, SOLUTION INTRAVENOUS; SUBCUTANEOUS
Status: DISCONTINUED | OUTPATIENT
Start: 2021-06-28 | End: 2021-07-03

## 2021-06-28 RX ORDER — DOXERCALCIFEROL 2 UG/ML
2 INJECTION, SOLUTION INTRAVENOUS
Status: DISCONTINUED | OUTPATIENT
Start: 2021-06-28 | End: 2021-07-03 | Stop reason: HOSPADM

## 2021-06-28 RX ORDER — HEPARIN SODIUM 1000 [USP'U]/ML
10000 INJECTION, SOLUTION INTRAVENOUS; SUBCUTANEOUS
Status: DISCONTINUED | OUTPATIENT
Start: 2021-06-28 | End: 2021-07-03

## 2021-06-28 RX ADMIN — INSULIN GLARGINE 20 UNITS: 100 INJECTION, SOLUTION SUBCUTANEOUS at 22:27

## 2021-06-28 RX ADMIN — PANTOPRAZOLE SODIUM 40 MG: 40 TABLET, DELAYED RELEASE ORAL at 05:27

## 2021-06-28 RX ADMIN — GABAPENTIN 300 MG: 300 CAPSULE ORAL at 12:32

## 2021-06-28 RX ADMIN — LORATADINE 10 MG: 10 TABLET ORAL at 08:13

## 2021-06-28 RX ADMIN — CEFEPIME HYDROCHLORIDE 1000 MG: 1 INJECTION, POWDER, FOR SOLUTION INTRAMUSCULAR; INTRAVENOUS at 22:27

## 2021-06-28 RX ADMIN — GUAIFENESIN 600 MG: 600 TABLET, EXTENDED RELEASE ORAL at 22:27

## 2021-06-28 RX ADMIN — CINACALCET 30 MG: 30 TABLET, FILM COATED ORAL at 08:13

## 2021-06-28 RX ADMIN — INSULIN LISPRO 4 UNITS: 100 INJECTION, SOLUTION INTRAVENOUS; SUBCUTANEOUS at 17:18

## 2021-06-28 RX ADMIN — PREDNISOLONE ACETATE 1 DROP: 10 SUSPENSION/ DROPS OPHTHALMIC at 12:19

## 2021-06-28 RX ADMIN — WARFARIN SODIUM 9 MG: 7.5 TABLET ORAL at 17:24

## 2021-06-28 RX ADMIN — TORSEMIDE 100 MG: 20 TABLET ORAL at 22:27

## 2021-06-28 RX ADMIN — LEVOTHYROXINE SODIUM 50 MCG: 50 TABLET ORAL at 05:27

## 2021-06-28 RX ADMIN — CALCIUM CARBONATE 1250 MG: 1250 SUSPENSION ORAL at 08:14

## 2021-06-28 RX ADMIN — HEPARIN SODIUM 10000 UNITS: 1000 INJECTION INTRAVENOUS; SUBCUTANEOUS at 15:04

## 2021-06-28 RX ADMIN — BRIMONIDINE TARTRATE 1 DROP: 1.5 SOLUTION OPHTHALMIC at 17:17

## 2021-06-28 RX ADMIN — INSULIN LISPRO 4 UNITS: 100 INJECTION, SOLUTION INTRAVENOUS; SUBCUTANEOUS at 12:39

## 2021-06-28 RX ADMIN — SEVELAMER HYDROCHLORIDE 800 MG: 800 TABLET, FILM COATED PARENTERAL at 17:17

## 2021-06-28 RX ADMIN — ATROPINE SULFATE 1 DROP: 10 SOLUTION/ DROPS OPHTHALMIC at 22:28

## 2021-06-28 RX ADMIN — CARVEDILOL 25 MG: 25 TABLET, FILM COATED ORAL at 17:17

## 2021-06-28 RX ADMIN — INSULIN LISPRO 2 UNITS: 100 INJECTION, SOLUTION INTRAVENOUS; SUBCUTANEOUS at 08:17

## 2021-06-28 RX ADMIN — ATORVASTATIN CALCIUM 20 MG: 20 TABLET, FILM COATED ORAL at 17:17

## 2021-06-28 RX ADMIN — NIFEDIPINE 30 MG: 30 TABLET, FILM COATED, EXTENDED RELEASE ORAL at 08:13

## 2021-06-28 RX ADMIN — SERTRALINE HYDROCHLORIDE 50 MG: 50 TABLET ORAL at 08:13

## 2021-06-28 RX ADMIN — SEVELAMER HYDROCHLORIDE 800 MG: 800 TABLET, FILM COATED PARENTERAL at 08:13

## 2021-06-28 RX ADMIN — GUAIFENESIN 600 MG: 600 TABLET, EXTENDED RELEASE ORAL at 08:13

## 2021-06-28 RX ADMIN — PREDNISOLONE ACETATE 1 DROP: 10 SUSPENSION/ DROPS OPHTHALMIC at 22:28

## 2021-06-28 RX ADMIN — INSULIN LISPRO 2 UNITS: 100 INJECTION, SOLUTION INTRAVENOUS; SUBCUTANEOUS at 22:28

## 2021-06-28 RX ADMIN — PREDNISOLONE ACETATE 1 DROP: 10 SUSPENSION/ DROPS OPHTHALMIC at 17:25

## 2021-06-28 RX ADMIN — TORSEMIDE 100 MG: 20 TABLET ORAL at 08:12

## 2021-06-28 RX ADMIN — HEPARIN SODIUM 18 UNITS/KG/HR: 10000 INJECTION, SOLUTION INTRAVENOUS at 14:57

## 2021-06-28 RX ADMIN — SEVELAMER HYDROCHLORIDE 800 MG: 800 TABLET, FILM COATED PARENTERAL at 12:32

## 2021-06-28 RX ADMIN — CARVEDILOL 25 MG: 25 TABLET, FILM COATED ORAL at 08:13

## 2021-06-28 NOTE — CONSULTS
Please see my detailed progress note for today  Patient was discharged on Saturday only to be readmitted in 24 hours  Will proceed with a continuation of care note

## 2021-06-28 NOTE — PROGRESS NOTES
NEPHROLOGY PROGRESS NOTE   Jesi Xiao 49 y o  female MRN: [id number]  [facility] Encounter: [id number]  Reason for Consult: ESRD      SUMMARY:    51-year-old female with a history of end-stage renal disease on chronic incenter hemodialysis who was recently discharged from [facility] on Saturday aware she also underwent her dialysis only to be readmitted 24 hours later for bacterial pneumonia  She was recently discharged for left lung pneumonia  ASSESSMENT and PLAN:    End Stage Renal Disease  -Modality:In-Center Hemodialysis  -Schedule: Tuesday/Thursday/Saturday  -Dialysis Unit: [facility]  -Access: Left Arm AV Fistula  -Presciption:  240 minutes, dry weight 124 5 kg, blood flow rate 400 cc/minute, dialysis flow rate 600 cc/minute, 2 potassium, 3 calcium, 35 bicarbonate, 138 sodium  -Status:  Underwent dialysis on Saturday June 26 prior to discharge  -Plan:   · No plans for urgent indication for dialysis today  · Antibiotic should be dosed for dialysis  · Plan for next dialysis tomorrow    Mineral bone disorder-chronic kidney disease  --renal diet  --Hectorol 2 mcg with dialysis  --Sensipar 30 mg daily  --Nephrocaps 1 capsule daily  --sevelamer 3 tablets p o  t i d  With meals and 1 with snacks    Bilateral bacterial pneumonia  --management as per the internal medicine team  --dose antibiotics for dialysis  --dose vancomycin based on level, pharmacy assistance  Hypertension  --blood pressure at target  --does not examine volume overloaded    Anemia of chronic kidney disease  --hemoglobin below target  --not on Epogen due to history of pulmonary embolism      SUBJECTIVE / INTERVAL HISTORY:    Feeling little bit better today  No chest pain      OBJECTIVE:  Current Weight: Weight - Scale: 125 kg (275 lb)  Vitals:    06/27/21 1815 06/27/21 1953 06/27/21 2107 06/28/21 0743   BP: 124/78 159/77 119/51 121/59   BP Location: Right arm      Pulse: 72 80 79 81   Resp: 20 18 (!) 23 20 Temp:  98 °F (36 7 °C) 98 3 °F (36 8 °C) 98 °F (36 7 °C)   TempSrc:       SpO2: 94% 95% 96% 96%   Weight:           Intake/Output Summary (Last 24 hours) at 6/28/2021 0920  Last data filed at 6/28/2021 0800  Gross per 24 hour   Intake 550 ml   Output --   Net 550 ml       Review of Systems:    12 point ROS has been reviewed  Physical Exam  Vitals and nursing note reviewed  Constitutional:       General: She is not in acute distress  Appearance: She is well-developed  HENT:      Head: Normocephalic and atraumatic  Eyes:      General: No scleral icterus  Conjunctiva/sclera: Conjunctivae normal       Pupils: Pupils are equal, round, and reactive to light  Cardiovascular:      Rate and Rhythm: Normal rate and regular rhythm  Heart sounds: S1 normal and S2 normal  No murmur heard  No friction rub  No gallop  Pulmonary:      Effort: Pulmonary effort is normal  No respiratory distress  Breath sounds: Normal breath sounds  No wheezing or rales  Abdominal:      General: Bowel sounds are normal       Palpations: Abdomen is soft  Tenderness: There is no abdominal tenderness  There is no rebound  Musculoskeletal:         General: Normal range of motion  Cervical back: Normal range of motion and neck supple  Skin:     Findings: No rash  Neurological:      Mental Status: She is alert and oriented to person, place, and time     Psychiatric:         Behavior: Behavior normal          Medications:    Current Facility-Administered Medications:     acetaminophen (TYLENOL) tablet 650 mg, 650 mg, Oral, Q6H PRN, Jj Duque,     ALPRAZolam Ynes Kincaid) tablet 0 25 mg, 0 25 mg, Oral, BID PRN, Jj Duque,     aluminum-magnesium hydroxide-simethicone (MYLANTA) oral suspension 30 mL, 30 mL, Oral, Q6H PRN, Jj Duque,     atorvastatin (LIPITOR) tablet 20 mg, 20 mg, Oral, QPM, Jj Duque DO, 20 mg at 06/27/21 4132    atropine (ISOPTO ATROPINE) 1 % ophthalmic solution 1 drop, 1 drop, Both Eyes, HS, Jasmina Pedraza, DO, 1 drop at 06/27/21 2214    brimonidine (ALPHAGAN P) 0 15 % ophthalmic solution 1 drop, 1 drop, Both Eyes, BID, Jasmina Pedraza, DO, 1 drop at 06/27/21 2214    Calcium Carbonate Antacid oral suspension 1,250 mg, 1,250 mg, Oral, Daily, Jasmina Pedraza, DO, 1,250 mg at 06/28/21 5239    carvedilol (COREG) tablet 25 mg, 25 mg, Oral, BID With Meals, Jasmina Pedraza, DO, 25 mg at 06/28/21 0813    cefepime (MAXIPIME) 1,000 mg in dextrose 5 % 50 mL IVPB, 1,000 mg, Intravenous, Daily, Jasmina Pedraza DO, Stopped at 06/27/21 2207    cinacalcet (SENSIPAR) tablet 30 mg, 30 mg, Oral, Daily, Jasmina Pedraza DO, 30 mg at 06/28/21 0813    dextromethorphan-guaiFENesin (ROBITUSSIN DM) oral syrup 10 mL, 10 mL, Oral, Q4H PRN, Jasmina Pedraza, , 10 mL at 06/27/21 2212    gabapentin (NEURONTIN) capsule 300 mg, 300 mg, Oral, Daily, Jasmina Pedraza DO    guaiFENesin [facility]) 12 hr tablet 600 mg, 600 mg, Oral, Q12H, Jasmina Pedraza DO, 600 mg at 06/28/21 0813    insulin glargine (LANTUS) subcutaneous injection 20 Units 0 2 mL, 20 Units, Subcutaneous, HS, Jasmina Pedraza DO, 20 Units at 06/27/21 2220    insulin lispro (HumaLOG) 100 units/mL subcutaneous injection 1-5 Units, 1-5 Units, Subcutaneous, HS, Jasmina Pedraza DO, 3 Units at 06/27/21 2222    insulin lispro (HumaLOG) 100 units/mL subcutaneous injection 2-12 Units, 2-12 Units, Subcutaneous, TID AC, 2 Units at 06/28/21 0817 **AND** Fingerstick Glucose (POCT), , , TID AC, Jasmina Pedraza     levothyroxine tablet 50 mcg, 50 mcg, Oral, Early Morning, Jasminajavon Pedraza, DO, 50 mcg at 06/28/21 3388    lidocaine (LIDODERM) 5 % patch 1 patch, 1 patch, Topical, Daily, Jasmina Pedraza DO    loratadine (CLARITIN) tablet 10 mg, 10 mg, Oral, Daily, Jasmina Pedraza, , 10 mg at 06/28/21 0813    NIFEdipine (PROCARDIA XL) 24 hr tablet 30 mg, 30 mg, Oral, Daily, Jasmina Pedraza, , 30 mg at 06/28/21 0813    ondansetron (ZOFRAN) injection 4 mg, 4 mg, Intravenous, Q6H PRN, Thurl Sloop, DO    pantoprazole (PROTONIX) EC tablet 40 mg, 40 mg, Oral, Early Morning, Thurl Sloop, DO, 40 mg at 06/28/21 1849    prednisoLONE acetate (PRED FORTE) 1 % ophthalmic suspension 1 drop, 1 drop, Both Eyes, 4x Daily, Thurl Sloop, DO, 1 drop at 06/27/21 2213    senna (SENOKOT) tablet 17 2 mg, 2 tablet, Oral, HS PRN, Thurl Sloop, DO, 17 2 mg at 06/27/21 2213    sertraline (ZOLOFT) tablet 50 mg, 50 mg, Oral, Daily, Thurl Sloop, DO, 50 mg at 06/28/21 0813    sevelamer (RENAGEL) tablet 800 mg, 800 mg, Oral, TID With Meals, Thurl Sloop, DO, 800 mg at 06/28/21 0813    sodium chloride (OCEAN) 0 65 % nasal spray 1 spray, 1 spray, Each Nare, TID PRN, Thurl Sloop, DO    torsemide [facility]) tablet 100 mg, 100 mg, Oral, Q12H, Thurl Sloop, DO, 100 mg at 06/28/21 8531    [START ON 6/29/2021] vancomycin (VANCOCIN) IVPB (premix in dextrose) 750 mg 150 mL, 10 mg/kg (Adjusted), Intravenous, After Dialysis, Thurl Sloop, DO    Laboratory Results:  Results from last 7 days   Lab Units 06/28/21  0513 06/27/21  1559 06/24/21  0457 06/24/21  0456 06/23/21  1310 06/23/21  1253   WBC Thousand/uL 7 75 8 96 8 59  --  10 44*  --    HEMOGLOBIN g/dL 8 6* 9 3* 8 4*  --  8 9*  --    HEMATOCRIT % 26 9* 27 6* 25 7*  --  27 4*  --    PLATELETS Thousands/uL 191 228 198  --  214  --    POTASSIUM mmol/L 4 8 5 1  --  5 0  --  4 8   CHLORIDE mmol/L 102 100  --  94*  --  97*   CO2 mmol/L 27 26  --  25  --  27   BUN mg/dL 57* 45*  --  66*  --  47*   CREATININE mg/dL 7 57* 6 80*  --  8 87*  --  7 47*   CALCIUM mg/dL 8 7 8 4  --  7 7*  --  8 8   MAGNESIUM mg/dL 2 2  --   --  1 9  --   --    PHOSPHORUS mg/dL  --   --   --  4 5  --   --

## 2021-06-28 NOTE — CASE MANAGEMENT
LOS 1 Day  Not a Bundle  Readmission from DC   Unplanned Readmission Risk is 45 YELLOW-will need TCM prior to DC 6/26      CM met with pt at bedside to discuss role of CM and DC planning  Pt lives with boyfriend in a 2 story home with 15 BARBARA to enter and 15 steps to the 2nd floor  Pt IPTA with ADLs, but is requesting VNA for medication management upon DC  Pt has a hard time seeing her meds due to vision issues and her boyfriend is willing to be taught how to administer  Pt does not drive, her boyfriend does  Pt does not work  Hx of VNA 1 year ago unsure of agency  Denies hx of STR  Hx of OPPT with [facility] on [facility]  PCP is Dr Mary Aggarwal; Pt uses [facility] pharmacy on [facility]  No reported hx of MH or D&A  No LW/POA on file  Pt receives dialysis from [facility] on [address] Tues/Thurs/Sat 6am      A post acute care recommendation was made by your care team for [facility] 80  Discussed Freedom of Choice with patient  List of agencies given to patient via in person  patient aware the list is custom filtered for them by preference  and that [facility] post acute providers are designated  Primary Contact:  Charlie Amezquita (boyfriend) [phone]  No POA/LW  Boyfriend to transport at DC    CM reviewed d/c planning process including the following: identifying help at home, patient preference for d/c planning needs, Discharge Lounge, [facility] Meds to Bed program, availability of treatment team to discuss questions or concerns patient and/or family may have regarding understanding medications and recognizing signs and symptoms once discharged  CM also encouraged patient to follow up with all recommended appointments after discharge  Patient advised of importance for patient and family to participate in managing patients medical well being

## 2021-06-28 NOTE — PLAN OF CARE
Problem: Potential for Falls  Goal: Patient will remain free of falls  Description: INTERVENTIONS:  - Educate patient/family on patient safety including physical limitations  - Instruct patient to call for assistance with activity   - Consult OT/PT to assist with strengthening/mobility   - Keep Call bell within reach  - Keep bed low and locked with side rails adjusted as appropriate  - Keep care items and personal belongings within reach  - Initiate and maintain comfort rounds  - Make Fall Risk Sign visible to staff  - Offer Toileting every 2 Hours, in advance of need  - Initiate/Maintain 2 alarm  - Obtain necessary fall risk management equipment:   - Apply yellow socks and bracelet for high fall risk patients  - Consider moving patient to room near nurses station  Outcome: Progressing     Problem: RESPIRATORY - ADULT  Goal: Achieves optimal ventilation and oxygenation  Description: INTERVENTIONS:  - Assess for changes in respiratory status  - Assess for changes in mentation and behavior  - Position to facilitate oxygenation and minimize respiratory effort  - Oxygen administered by appropriate delivery if ordered  - Initiate smoking cessation education as indicated  - Encourage broncho-pulmonary hygiene including cough, deep breathe, Incentive Spirometry  - Assess the need for suctioning and aspirate as needed  - Assess and instruct to report SOB or any respiratory difficulty  - Respiratory Therapy support as indicated  Outcome: Progressing

## 2021-06-28 NOTE — CONSULTS
Pulmonary Consultation   Jesi Xiao 49 y o  female MRN: [id number]  Unit/Bed#: [facility] 920-01 Encounter: [id number]    Reason for consultation: Recurrent pneumonia  Requesting physician: Dr Varghese Mention  Impressions:  1  Recurrent HAP, likely bacterial, rule out organizing pneumonia  2  ESRD on HD  3  DM2   4  DVT on coumadin  Recommendations:  1  Will follow up sputum culture - if causative organism found, will treat  2  Discussed bronchoscopy, patient prefers to hold for now  3  Since infiltrates did improve in the DELMI on broad spectrum antibiotics, would continue for now  4  Trend procalcitonin daily  5  Will continue to follow  History of Present Illness   HPI:  Jesi Xiao is a 49 y o  female who was admitted for recurrent pneumonia  Recently she has been hospitalized several times  First, on June 8th, she was admitted for chest pain thought due to fluid overload  She spent 3 days in the hospital after having the complication of hypotension and hypoglycemia with dialysis which was concerning at the time for an acute stroke  She returned 12 days later with complaints of a cough  Outpatient CXR at the time showed a left-sided pneumonia, and the patient was coughing up green sputum which was new  She was admitted for 3 days then, treated with vanco/cefepime, transitioned to vantin outpatient which she never ended up taking a dose of due to returning to the hospital with persistent symptoms thereafter  She did feel better after several days of antibiotics in the hospital       Now, she complains again of green/yellow sputum production, subjective fevers without temperature checks, but no shortness of breath  She does have weakness  Imaging does not suggest volume issues but does show a new area of infiltrate in the RLL with improvement in the DELMI  Review of systems:  Review of Systems   Constitutional: Positive for fever  Respiratory: Positive for cough  Negative for shortness of breath  All other systems reviewed and are negative  All other 12-point review of systems are negative  Historical Information   Past Medical History:   Diagnosis Date    Abnormal uterine bleeding (AUB)     Anxiety     Arthritis     Chronic kidney disease     Claustrophobia     Diabetes mellitus ([facility] )     Disease of thyroid gland     DVT (deep venous thrombosis) (HCC)     End stage kidney disease (HCC)     Foot ulcer due to secondary DM ([facility] )     Hemodialysis patient ([place] [address] )     Tues, Thurs, Sat    Hypertension     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[place] [address] )     right eye    Morbid obesity ([facility] )     Osteomyelitis of fifth toe of right foot (HCC)     Panic attacks     Pulmonary embolism (HCC)     Reflux esophagitis     Thrombophlebitis of saphenous vein     Warfarin anticoagulation      Past Surgical History:   Procedure Laterality Date    AMPUTATION      r 4th toe    ARTERIOVENOUS GRAFT PLACEMENT      CATARACT EXTRACTION Bilateral     CERVICAL BIOPSY  W/ LOOP ELECTRODE EXCISION       SECTION      DIALYSIS FISTULA CREATION      DILATION AND CURETTAGE OF UTERUS      ENDOMETRIAL ABLATION W/ NOVASURE      EYE SURGERY      HYSTERECTOMY      @ age 57 (complete)    IR AV FISTULAGRAM/GRAFTOGRAM  10/11/2019    IR AV FISTULAGRAM/GRAFTOGRAM  2020    IR AV FISTULAGRAM/GRAFTOGRAM  2020    OOPHORECTOMY Bilateral     @ age 57    PERICARDIUM SURGERY      NC AMPUTATION TOE,MT-P JT Right 2020    Procedure: AMPUTATION TOE- 5th;  Surgeon: Adonay Rucker DPM;  Location: [facility];  Service: Podiatry    NC COLONOSCOPY FLX DX W/COLLJ SPEC WHEN PFRMD N/A 3/13/2019    Procedure: COLONOSCOPY;  Surgeon: Manuel Renteria MD;  Location: [facility]; Service: Gastroenterology    NC ESOPHAGOGASTRODUODENOSCOPY TRANSORAL DIAGNOSTIC N/A 3/13/2019    Procedure: ESOPHAGOGASTRODUODENOSCOPY (EGD); Surgeon: Manuel Renteria MD;  Location: [facility];   Service: Gastroenterology    NC LAPAROSCOPY W TOT HYSTERECT UTERUS 250 GRAM OR LESS N/A 2/16/2016    Procedure: HYSTERECTOMY LAPAROSCOPIC TOTAL ([address]), with bilateral salpingectomy;  Surgeon: Kyle Roca DO;  [facility];  Service: Gynecology    THROMBECTOMY / ARTERIOVENOUS GRAFT REVISION      TOE SURGERY Right     removal of the 4th toe     Family History   Problem Relation Age of Onset    Heart disease Family     Diabetes Family     Heart disease Mother     Cancer Brother         neck    Throat cancer Brother     Ovarian cancer Maternal Aunt 40       Tobacco history: Former smoker, quit about 10 years ago      Family history: [place]     Meds/Allergies   Current Facility-Administered Medications   Medication Dose Route Frequency    acetaminophen (TYLENOL) tablet 650 mg  650 mg Oral Q6H PRN    ALPRAZolam (XANAX) tablet 0 25 mg  0 25 mg Oral BID PRN    aluminum-magnesium hydroxide-simethicone (MYLANTA) oral suspension 30 mL  30 mL Oral Q6H PRN    atorvastatin (LIPITOR) tablet 20 mg  20 mg Oral QPM    atropine (ISOPTO ATROPINE) 1 % ophthalmic solution 1 drop  1 drop Both Eyes HS    brimonidine (ALPHAGAN P) 0 15 % ophthalmic solution 1 drop  1 drop Both Eyes BID    Calcium Carbonate Antacid oral suspension 1,250 mg  1,250 mg Oral Daily    carvedilol (COREG) tablet 25 mg  25 mg Oral BID With Meals    cefepime (MAXIPIME) 1,000 mg in dextrose 5 % 50 mL IVPB  1,000 mg Intravenous Daily    cinacalcet (SENSIPAR) tablet 30 mg  30 mg Oral Daily    dextromethorphan-guaiFENesin (ROBITUSSIN DM) oral syrup 10 mL  10 mL Oral Q4H PRN    gabapentin (NEURONTIN) capsule 300 mg  300 mg Oral Daily    guaiFENesin (MUCINEX) 12 hr tablet 600 mg  600 mg Oral Q12H    heparin (porcine) 25,000 units in 0 45% NaCl 250 mL infusion (premix)  3-30 Units/kg/hr (Order-Specific) Intravenous Titrated    heparin (porcine) injection 10,000 Units  10,000 Units Intravenous Q1H PRN    heparin (porcine) injection 5,000 Units  5,000 Units Intravenous Q1H PRN  insulin glargine (LANTUS) subcutaneous injection 20 Units 0 2 mL  20 Units Subcutaneous HS    insulin lispro (HumaLOG) 100 units/mL subcutaneous injection 1-5 Units  1-5 Units Subcutaneous HS    insulin lispro (HumaLOG) 100 units/mL subcutaneous injection 2-12 Units  2-12 Units Subcutaneous TID AC    levothyroxine tablet 50 mcg  50 mcg Oral Early Morning    lidocaine (LIDODERM) 5 % patch 1 patch  1 patch Topical Daily    loratadine (CLARITIN) tablet 10 mg  10 mg Oral Daily    NIFEdipine (PROCARDIA XL) 24 hr tablet 30 mg  30 mg Oral Daily    ondansetron (ZOFRAN) injection 4 mg  4 mg Intravenous Q6H PRN    pantoprazole (PROTONIX) EC tablet 40 mg  40 mg Oral Early Morning    prednisoLONE acetate (PRED FORTE) 1 % ophthalmic suspension 1 drop  1 drop Both Eyes 4x Daily    senna (SENOKOT) tablet 17 2 mg  2 tablet Oral HS PRN    sertraline (ZOLOFT) tablet 50 mg  50 mg Oral Daily    sevelamer (RENAGEL) tablet 800 mg  800 mg Oral TID With Meals    sodium chloride (OCEAN) 0 65 % nasal spray 1 spray  1 spray Each Nare TID PRN    torsemide (DEMADEX) tablet 100 mg  100 mg Oral Q12H    [START ON 6/29/2021] vancomycin (VANCOCIN) IVPB (premix in dextrose) 750 mg 150 mL  10 mg/kg (Adjusted) Intravenous After Dialysis    [START ON 7/4/2021] warfarin (COUMADIN) tablet 12 mg  12 mg Oral Weekly    warfarin (COUMADIN) tablet 9 mg  9 mg Oral Once per day on Mon Tue Wed Thu Fri Sat     Allergies   Allergen Reactions    Iodinated Diagnostic Agents Itching       Vitals: Blood pressure 121/59, pulse 81, temperature 98 °F (36 7 °C), resp  rate 20, weight 125 kg (275 lb), last menstrual period 02/12/2016, SpO2 96 %, not currently breastfeeding  , on room air, Body mass index is 44 39 kg/m²  Intake/Output Summary (Last 24 hours) at 6/28/2021 1348  Last data filed at 6/28/2021 0915  Gross per 24 hour   Intake 910 ml   Output --   Net 910 ml     Physical exam:     Physical Exam  Vitals and nursing note reviewed  Constitutional:       General: She is not in acute distress  Appearance: Normal appearance  She is well-developed  She is obese  She is not ill-appearing  HENT:      Head: Normocephalic and atraumatic  Eyes:      General: No scleral icterus  Conjunctiva/sclera: Conjunctivae normal    Neck:      Vascular: No JVD  Cardiovascular:      Rate and Rhythm: Normal rate and regular rhythm  Heart sounds: Normal heart sounds  No murmur heard  No friction rub  No gallop  Pulmonary:      Effort: Pulmonary effort is normal  No respiratory distress  Breath sounds: Normal breath sounds  No wheezing, rhonchi or rales  Musculoskeletal:      Cervical back: Neck supple  Skin:     General: Skin is warm and dry  Findings: No rash  Neurological:      General: No focal deficit present  Mental Status: She is alert and oriented to person, place, and time  Mental status is at baseline  Psychiatric:         Mood and Affect: Mood normal          Behavior: Behavior normal      Labs: I have personally reviewed pertinent lab results      Results from last 7 days   Lab Units 06/28/21  0513 06/27/21  1559 06/24/21  0457   WBC Thousand/uL 7 75 8 96 8 59   HEMOGLOBIN g/dL 8 6* 9 3* 8 4*   HEMATOCRIT % 26 9* 27 6* 25 7*   PLATELETS Thousands/uL 191 228 198         Results from last 7 days   Lab Units 06/28/21  0513 06/27/21  1559 06/24/21  0456 06/23/21  1253   POTASSIUM mmol/L 4 8 5 1 5 0 4 8   CHLORIDE mmol/L 102 100 94* 97*   CO2 mmol/L 27 26 25 27   BUN mg/dL 57* 45* 66* 47*   CREATININE mg/dL 7 57* 6 80* 8 87* 7 47*   CALCIUM mg/dL 8 7 8 4 7 7* 8 8   ALK PHOS U/L  --  198* 153* 184*   ALT U/L  --  23 19 23   AST U/L  --  13 10 16     Results from last 7 days   Lab Units 06/28/21  1230 06/28/21  0526 06/24/21  0456 06/23/21  1310   INR   --  1 36* 1 92* 1 84*   PTT seconds 37  --  50*  --      Results from last 7 days   Lab Units 06/28/21  0513 06/24/21  0456   MAGNESIUM mg/dL 2 2 1 9     Imaging and other studies: I have personally reviewed pertinent films in PACS  Agree with interpretation provided  Code Status: Level 1 - Full Code    Thank you for allowing us to participate in the care of your patient      Demarcus Guerrero MD

## 2021-06-28 NOTE — PROGRESS NOTES
Vancomycin IV Pharmacy-to-Dose Consultation    Rashad Harding is a 49 y o  female who is currently receiving Vancomycin IV with management by the Pharmacy Consult service  Assessment/Plan:  The patient was reviewed  Renal function is stable and no signs or symptoms of nephrotoxicity and/or infusion reactions were documented in the chart  Based on todays assessment, continue current vancomycin (day # 2) dosing of 750 mg after hd, with a plan for trough to be drawn at am draw on 7-3  We will continue to follow the patients culture results and clinical progress daily      Jennifer Ferrera, Pharmacist

## 2021-06-29 ENCOUNTER — APPOINTMENT (INPATIENT)
Dept: DIALYSIS | Facility: HOSPITAL | Age: 56
DRG: 177 | End: 2021-06-29
Attending: INTERNAL MEDICINE
Payer: MEDICARE

## 2021-06-29 ENCOUNTER — APPOINTMENT (INPATIENT)
Dept: RADIOLOGY | Facility: HOSPITAL | Age: 56
DRG: 177 | End: 2021-06-29
Payer: MEDICARE

## 2021-06-29 LAB
APTT PPP: 41 SECONDS (ref 23–37)
B PARAP IS1001 DNA NPH QL NAA+NON-PROBE: NOT DETECTED
B PERT.PT PRMT NPH QL NAA+NON-PROBE: NOT DETECTED
C PNEUM DNA NPH QL NAA+NON-PROBE: NOT DETECTED
ERYTHROCYTE [DISTWIDTH] IN BLOOD BY AUTOMATED COUNT: 13 % (ref 11.6–15.1)
FLUAV RNA NPH QL NAA+NON-PROBE: NOT DETECTED
FLUBV RNA NPH QL NAA+NON-PROBE: NOT DETECTED
GLUCOSE SERPL-MCNC: 109 MG/DL (ref 65–140)
GLUCOSE SERPL-MCNC: 115 MG/DL (ref 65–140)
GLUCOSE SERPL-MCNC: 129 MG/DL (ref 65–140)
GLUCOSE SERPL-MCNC: 148 MG/DL (ref 65–140)
GLUCOSE SERPL-MCNC: 156 MG/DL (ref 65–140)
GLUCOSE SERPL-MCNC: 230 MG/DL (ref 65–140)
HADV DNA NPH QL NAA+NON-PROBE: NOT DETECTED
HCT VFR BLD AUTO: 26.4 % (ref 34.8–46.1)
HGB BLD-MCNC: 8.7 G/DL (ref 11.5–15.4)
HMPV RNA NPH QL NAA+NON-PROBE: NOT DETECTED
HPIV 1+2+3+4 RNA NPH QL NAA+NON-PROBE: NOT DETECTED
HPIV 1+2+3+4 RNA NPH QL NAA+NON-PROBE: NOT DETECTED
INR PPP: 1.4 (ref 0.84–1.19)
M PNEUMO DNA NPH QL NAA+NON-PROBE: NOT DETECTED
MCH RBC QN AUTO: 31.2 PG (ref 26.8–34.3)
MCHC RBC AUTO-ENTMCNC: 33 G/DL (ref 31.4–37.4)
MCV RBC AUTO: 95 FL (ref 82–98)
PLATELET # BLD AUTO: 178 THOUSANDS/UL (ref 149–390)
PMV BLD AUTO: 10.7 FL (ref 8.9–12.7)
PROCALCITONIN SERPL-MCNC: 0.14 NG/ML
PROTHROMBIN TIME: 17.1 SECONDS (ref 11.6–14.5)
RBC # BLD AUTO: 2.79 MILLION/UL (ref 3.81–5.12)
RSV RNA NPH QL NAA+NON-PROBE: NOT DETECTED
RV+EV RNA NPH QL NAA+NON-PROBE: NOT DETECTED
WBC # BLD AUTO: 8.54 THOUSAND/UL (ref 4.31–10.16)

## 2021-06-29 PROCEDURE — 87081 CULTURE SCREEN ONLY: CPT | Performed by: INTERNAL MEDICINE

## 2021-06-29 PROCEDURE — 77001 FLUOROGUIDE FOR VEIN DEVICE: CPT | Performed by: RADIOLOGY

## 2021-06-29 PROCEDURE — 36556 INSERT NON-TUNNEL CV CATH: CPT | Performed by: RADIOLOGY

## 2021-06-29 PROCEDURE — 76937 US GUIDE VASCULAR ACCESS: CPT

## 2021-06-29 PROCEDURE — 90935 HEMODIALYSIS ONE EVALUATION: CPT | Performed by: INTERNAL MEDICINE

## 2021-06-29 PROCEDURE — 85730 THROMBOPLASTIN TIME PARTIAL: CPT | Performed by: INTERNAL MEDICINE

## 2021-06-29 PROCEDURE — 87581 M.PNEUMON DNA AMP PROBE: CPT | Performed by: INTERNAL MEDICINE

## 2021-06-29 PROCEDURE — 76937 US GUIDE VASCULAR ACCESS: CPT | Performed by: RADIOLOGY

## 2021-06-29 PROCEDURE — 5A1D70Z PERFORMANCE OF URINARY FILTRATION, INTERMITTENT, LESS THAN 6 HOURS PER DAY: ICD-10-PCS | Performed by: INTERNAL MEDICINE

## 2021-06-29 PROCEDURE — 02HV33Z INSERTION OF INFUSION DEVICE INTO SUPERIOR VENA CAVA, PERCUTANEOUS APPROACH: ICD-10-PCS | Performed by: RADIOLOGY

## 2021-06-29 PROCEDURE — 85610 PROTHROMBIN TIME: CPT | Performed by: INTERNAL MEDICINE

## 2021-06-29 PROCEDURE — NC001 PR NO CHARGE: Performed by: PHYSICIAN ASSISTANT

## 2021-06-29 PROCEDURE — 87798 DETECT AGENT NOS DNA AMP: CPT | Performed by: INTERNAL MEDICINE

## 2021-06-29 PROCEDURE — 99233 SBSQ HOSP IP/OBS HIGH 50: CPT | Performed by: INTERNAL MEDICINE

## 2021-06-29 PROCEDURE — 85027 COMPLETE CBC AUTOMATED: CPT | Performed by: INTERNAL MEDICINE

## 2021-06-29 PROCEDURE — C1751 CATH, INF, PER/CENT/MIDLINE: HCPCS

## 2021-06-29 PROCEDURE — 84145 PROCALCITONIN (PCT): CPT | Performed by: INTERNAL MEDICINE

## 2021-06-29 PROCEDURE — 82948 REAGENT STRIP/BLOOD GLUCOSE: CPT

## 2021-06-29 PROCEDURE — 99232 SBSQ HOSP IP/OBS MODERATE 35: CPT | Performed by: INTERNAL MEDICINE

## 2021-06-29 PROCEDURE — NC001 PR NO CHARGE: Performed by: RADIOLOGY

## 2021-06-29 PROCEDURE — 87486 CHLMYD PNEUM DNA AMP PROBE: CPT | Performed by: INTERNAL MEDICINE

## 2021-06-29 PROCEDURE — 87633 RESP VIRUS 12-25 TARGETS: CPT | Performed by: INTERNAL MEDICINE

## 2021-06-29 RX ORDER — ONDANSETRON 4 MG/1
4 TABLET, ORALLY DISINTEGRATING ORAL EVERY 6 HOURS PRN
Status: DISCONTINUED | OUTPATIENT
Start: 2021-06-29 | End: 2021-06-30

## 2021-06-29 RX ORDER — METOPROLOL TARTRATE 5 MG/5ML
2.5 INJECTION INTRAVENOUS EVERY 6 HOURS PRN
Status: DISCONTINUED | OUTPATIENT
Start: 2021-06-29 | End: 2021-07-03 | Stop reason: HOSPADM

## 2021-06-29 RX ADMIN — PANTOPRAZOLE SODIUM 40 MG: 40 TABLET, DELAYED RELEASE ORAL at 06:55

## 2021-06-29 RX ADMIN — DOXERCALCIFEROL 2 MCG: 4 INJECTION, SOLUTION INTRAVENOUS at 10:49

## 2021-06-29 RX ADMIN — CINACALCET 30 MG: 30 TABLET, FILM COATED ORAL at 08:24

## 2021-06-29 RX ADMIN — CALCIUM CARBONATE 1250 MG: 1250 SUSPENSION ORAL at 08:24

## 2021-06-29 RX ADMIN — GABAPENTIN 300 MG: 300 CAPSULE ORAL at 18:23

## 2021-06-29 RX ADMIN — TORSEMIDE 100 MG: 20 TABLET ORAL at 21:36

## 2021-06-29 RX ADMIN — ONDANSETRON 4 MG: 4 TABLET, ORALLY DISINTEGRATING ORAL at 15:08

## 2021-06-29 RX ADMIN — INSULIN LISPRO 2 UNITS: 100 INJECTION, SOLUTION INTRAVENOUS; SUBCUTANEOUS at 21:35

## 2021-06-29 RX ADMIN — ACETAMINOPHEN 650 MG: 325 TABLET ORAL at 06:56

## 2021-06-29 RX ADMIN — SEVELAMER HYDROCHLORIDE 800 MG: 800 TABLET, FILM COATED PARENTERAL at 08:24

## 2021-06-29 RX ADMIN — LEVOTHYROXINE SODIUM 50 MCG: 50 TABLET ORAL at 06:55

## 2021-06-29 RX ADMIN — PREDNISOLONE ACETATE 1 DROP: 10 SUSPENSION/ DROPS OPHTHALMIC at 08:30

## 2021-06-29 RX ADMIN — LORATADINE 10 MG: 10 TABLET ORAL at 08:24

## 2021-06-29 RX ADMIN — ATORVASTATIN CALCIUM 20 MG: 20 TABLET, FILM COATED ORAL at 18:21

## 2021-06-29 RX ADMIN — ALPRAZOLAM 0.25 MG: 0.25 TABLET ORAL at 08:27

## 2021-06-29 RX ADMIN — GUAIFENESIN 600 MG: 600 TABLET, EXTENDED RELEASE ORAL at 21:36

## 2021-06-29 RX ADMIN — GUAIFENESIN 600 MG: 600 TABLET, EXTENDED RELEASE ORAL at 08:24

## 2021-06-29 RX ADMIN — CARVEDILOL 25 MG: 25 TABLET, FILM COATED ORAL at 15:36

## 2021-06-29 RX ADMIN — CEFEPIME HYDROCHLORIDE 1000 MG: 1 INJECTION, POWDER, FOR SOLUTION INTRAMUSCULAR; INTRAVENOUS at 21:39

## 2021-06-29 RX ADMIN — INSULIN GLARGINE 20 UNITS: 100 INJECTION, SOLUTION SUBCUTANEOUS at 21:36

## 2021-06-29 RX ADMIN — VANCOMYCIN HYDROCHLORIDE 750 MG: 750 INJECTION, SOLUTION INTRAVENOUS at 11:49

## 2021-06-29 RX ADMIN — WARFARIN SODIUM 9 MG: 7.5 TABLET ORAL at 18:22

## 2021-06-29 RX ADMIN — ACETAMINOPHEN 650 MG: 325 TABLET ORAL at 18:38

## 2021-06-29 RX ADMIN — SERTRALINE HYDROCHLORIDE 50 MG: 50 TABLET ORAL at 08:24

## 2021-06-29 RX ADMIN — ALPRAZOLAM 0.25 MG: 0.25 TABLET ORAL at 15:33

## 2021-06-29 NOTE — TELEMEDICINE
e-Consult (IPC)  - Interventional Radiology  Jose Condon 49 y o  female MRN: [id number]  Unit/Bed#: [facility] 920-01 Encounter: [id number]    IR has been consulted to evaluate the patient, determine the appropriate procedure, and whether or not a procedure can and should be performed regarding the care of Jose Condon  We were consulted by internal medicine concerning need for IV access, and to possibly perform a temporary IJ if medically appropriate for the patient  Consults  06/29/21      Assessment/Recommendation:     49year old female with pneumonia, currently on vanco, cefepime, heparin gtt, no IV access    - spoke with nephrology  Prefer temporary IJ access  - will plan for today      Total time spent in review of data, discussion with requesting provider and rendering advice was 25 minutes       Patient or appropriate family member was verbally informed by internal medicine of this consultative service on their behalf to provide more timely access to specialty care in lieu of an in person consultation  Verbal consent was obtained  Thank you for allowing Interventional Radiology to participate in the care of Jose Condon  Please don't hesitate to call or TigerText us with any questions       Aleksander Prasad PA-C

## 2021-06-29 NOTE — PROCEDURES
Central Line    Date/Time: 6/29/2021 5:36 PM  Performed by: Bonnie Jeffery MD  Authorized by: Bonnie Jeffery MD     Patient location:  IR  Universal protocol:     Procedure explained and questions answered to patient or proxy's satisfaction: yes      Relevant documents present and verified: yes      Radiology Images displayed and confirmed  If images not available, report reviewed: yes      Immediately prior to procedure, a time out was called: yes      Patient identity confirmed:  Verbally with patient, arm band and provided demographic data  Pre-procedure details:     Hand hygiene: Hand hygiene performed prior to insertion      Sterile barrier technique: All elements of maximal sterile technique followed      Skin preparation:  2% chlorhexidine    Skin preparation agent: Skin preparation agent completely dried prior to procedure    Indications:     Central line indications: medications requiring central line    Anesthesia (see MAR for exact dosages): Anesthesia method:  Local infiltration    Local anesthetic:  Lidocaine 1% w/o epi  Procedure details:     Location:  Right internal jugular    Vessel type: vein      Approach: percutaneous technique used      Catheter type:  Double lumen    Catheter size:  5 Fr    Ultrasound guidance: yes      Ultrasound image availability:  Images available in PACS    Sterile ultrasound techniques: Sterile gel and sterile probe covers were used      Successful placement: yes      Vessel of catheter tip end:  Svc  Post-procedure details:     Post-procedure:  Dressing applied and line sutured    Assessment:  Blood return through all ports and placement verified by x-ray    Post-procedure complications: none      Patient tolerance of procedure:   Tolerated well, no immediate complications

## 2021-06-29 NOTE — ASSESSMENT & PLAN NOTE
Lab Results   Component Value Date    HGBA1C 8 4 (H) 06/11/2021       Recent Labs     06/28/21  0747 06/28/21  1236 06/28/21  1640 06/28/21 2055   POCGLU 193* 227* 231* 232*       Blood Sugar Average: Last 72 hrs:  (P) 243 4   Cont home insulin regimen  Ct SSI

## 2021-06-29 NOTE — ASSESSMENT & PLAN NOTE
Lab Results   Component Value Date    EGFR 6 06/28/2021    EGFR 6 06/27/2021    EGFR 5 06/24/2021    CREATININE 7 57 (H) 06/28/2021    CREATININE 6 80 (H) 06/27/2021    CREATININE 8 87 (H) 06/24/2021   On HD T/Th/Sat  Nephrology following

## 2021-06-29 NOTE — ASSESSMENT & PLAN NOTE
Lab Results   Component Value Date    HGBA1C 8 4 (H) 06/11/2021       Recent Labs     06/28/21  1640 06/28/21 2055 06/29/21  0908 06/29/21  1201   POCGLU 231* 232* 156* 109       Blood Sugar Average: Last 72 hrs:  (P) 211 [id number]   Cont home insulin regimen  Ct SSI

## 2021-06-29 NOTE — PROGRESS NOTES
Vancomycin IV Pharmacy-to-Dose Consultation    Dary Sidhu is a 49 y o  female who is currently receiving Vancomycin IV with management by the Pharmacy Consult service for pneumonia    Assessment/Plan:  The patient was reviewed  Renal function is stable and no signs or symptoms of nephrotoxicity and/or infusion reactions were documented in the chart  Based on todays assessment, continue current vancomycin (day # 3) dosing of 750mg post HEMO on T/TH/Sat, with a plan for a random to be drawn at 0600 on 7/3      We will continue to follow the patients culture results and clinical progress daily      Thanks  Marc Garcia, Pharmacist

## 2021-06-29 NOTE — PROGRESS NOTES
[address] [facility]  Progress Note Mario Neely 1965, 49 y o  female MRN: [id number]  [facility] Encounter: [id number]  Primary Care Provider: Vaishnavi Barboza MD   Date and time admitted to hospital: 6/27/2021  3:20 PM    * Pneumonia  Assessment & Plan  Possible gram negative pneumonia  Recent admission from 6/23/21 to 6/26/21 with left upper lobe pneumonia - received Vanco and doxycycline x 1 day, Cefepime x 4 days, then discharged on cefedinir (6/26) to complete 3 more days for a total of 7 days  Presented on 6/27 with shortness of breath and temp of 101 at home  Repeat CT chest - "New right lower lobe opacity /pneumonia   Minimally improved left upper lobe consolidation /pneumonia "  No fever or leucocytosis noted after arrival  Procal - negative  Hx of MRSA 6/8  On Cefepime/Vancomycin  F/u sputum c/s,   Recheck procalcitonin  Seen by pulm - rule out recurrent HAP, bacterial versus viral versus possible organizing pneumonia,  bronchoscopy discussed with patient but she prefers to hold off for now, Abx continued as DELMI infiltrates had improved on broad spectrum antibiotics    Hypertension  Assessment & Plan  Continue home regimen  Monitor BP - acceptable at present  Nifedipine had been added to regimen during recent admission    Hypothyroidism  Assessment & Plan  TSH 0 736 on 06/24  Cont levothyroxine 50 mcg daily    Morbid obesity ([address] )  Assessment & Plan  Lifestyle/dietary modification    Type 2 diabetes mellitus [facility])  Assessment & Plan  Lab Results   Component Value Date    HGBA1C 8 4 (H) 06/11/2021       Recent Labs     06/28/21  1640 06/28/21  2055 06/29/21  0908 06/29/21  1201   POCGLU 231* 232* 156* 109       Blood Sugar Average: Last 72 hrs:  (P) 211 [id number]   Cont home insulin regimen  Ct SSI      ESRD on hemodialysis [facility])  Assessment & Plan  Lab Results   Component Value Date    EGFR 6 06/28/2021    EGFR 6 06/27/2021    EGFR 5 06/24/2021 CREATININE 7 57 (H) 2021    CREATININE 6 80 (H) 2021    CREATININE 8 87 (H) 2021   On HD T//Sat  Nephrology following    History of DVT (deep vein thrombosis)/PE  Assessment & Plan  · INR subtherapeutic  · Restart home Coumadin regimen  · Heparin drip till INR therapeutic         VTE Pharmacologic Prophylaxis: VTE Score: 7 Coumadin    Patient Centered Rounds: I performed bedside rounds with nursing staff today  Discussions with Specialists or Other Care Team Provider:     Education and Discussions with Family / Patient: Updated  (significant other) at bedside  Time Spent for Care: 45 minutes  More than 50% of total time spent on counseling and coordination of care as described above  Current Length of Stay: 2 day(s)  Current Patient Status: Inpatient   Certification Statement: The patient will continue to require additional inpatient hospital stay due to Above  Discharge Plan: TBD    Code Status: Level 1 - Full Code    Subjective:   Patient seen and examined  Comfortable in bed  Just came back from dialysis  Still with dyspnea on exertion and intermittent cough    Objective:     Vitals:   Temp (24hrs), Av 1 °F (36 7 °C), Min:97 8 °F (36 6 °C), Max:98 5 °F (36 9 °C)    Temp:  [97 8 °F (36 6 °C)-98 5 °F (36 9 °C)] 98 5 °F (36 9 °C)  HR:  [72-80] 77  Resp:  [18] 18  BP: ()/() 182/82  SpO2:  [92 %-97 %] 92 %  Body mass index is 44 39 kg/m²  Input and Output Summary (last 24 hours):      Intake/Output Summary (Last 24 hours) at 2021 1422  Last data filed at 2021 1349  Gross per 24 hour   Intake 1278 76 ml   Output 4010 ml   Net -2731 24 ml       Physical Exam:   Physical Exam   Patient is awake alert in no acute distress  Obese  Lung with decreased breath sounds bilateral  Heart positive S1-S2 no murmur  Abdomen soft nontender  Lower extremities no edema    Additional Data:     Labs:  Results from last 7 days   Lab Units 21  0616 21  3198 WBC Thousand/uL 8 54 8 96   HEMOGLOBIN g/dL 8 7* 9 3*   HEMATOCRIT % 26 4* 27 6*   PLATELETS Thousands/uL 178 228   NEUTROS PCT %  --  74   LYMPHS PCT %  --  16   MONOS PCT %  --  7   EOS PCT %  --  1     Results from last 7 days   Lab Units 06/28/21  0513 06/27/21  1559   SODIUM mmol/L 137 135*   POTASSIUM mmol/L 4 8 5 1   CHLORIDE mmol/L 102 100   CO2 mmol/L 27 26   BUN mg/dL 57* 45*   CREATININE mg/dL 7 57* 6 80*   ANION GAP mmol/L 8 9   CALCIUM mg/dL 8 7 8 4   ALBUMIN g/dL  --  2 9*   TOTAL BILIRUBIN mg/dL  --  0 49   ALK PHOS U/L  --  198*   ALT U/L  --  23   AST U/L  --  13   GLUCOSE RANDOM mg/dL 175* 293*     Results from last 7 days   Lab Units 06/29/21  0616   INR  1 40*     Results from last 7 days   Lab Units 06/29/21  1407 06/29/21  1201 06/29/21  0908 06/28/21  2055 06/28/21  1640 06/28/21  1236 06/28/21  0747 06/27/21  2221 06/26/21  1249 06/26/21  0616 06/25/21  2223 06/25/21  1610   POC GLUCOSE mg/dl 148* 109 156* 232* 231* 227* 193* 334* 138 164* 353* 190*         Results from last 7 days   Lab Units 06/29/21  0616 06/28/21  1230 06/25/21  0514 06/24/21  0456   PROCALCITONIN ng/ml 0 14 0 17 0 26* 0 27*       Lines/Drains:  Invasive Devices     Line            Hemodialysis AV Fistula 02/20/18 Left Forearm 1224 days                      Imaging:  Reviewed    Recent Cultures (last 7 days):   Results from last 7 days   Lab Units 06/28/21  0826 06/27/21  1910   BLOOD CULTURE   --  No Growth at 24 hrs     SPUTUM CULTURE  Culture too young- will reincubate  --    GRAM STAIN RESULT  2+ Epithelial cells per low power field*  1+ Polys*  1+ Gram positive rods*  1+ Gram negative rods*  1+ Gram positive cocci in pairs*  --        Last 24 Hours Medication List:   Current Facility-Administered Medications   Medication Dose Route Frequency Provider Last Rate    acetaminophen  650 mg Oral Q6H PRN Damon Leader,       ALPRAZolam  0 25 mg Oral BID PRN Damon Leader, DO      aluminum-magnesium hydroxide-simethicone  30 mL Oral Q6H PRN Awa Liberian, DO      atorvastatin  20 mg Oral QPM Awa Russian, DO      atropine  1 drop Both Eyes HS Awa Russian, DO      brimonidine  1 drop Both Eyes BID Awa Russian, DO      Calcium Carbonate Antacid  1,250 mg Oral Daily Awa Russian, DO      carvedilol  25 mg Oral BID With Meals Awa Russian, DO      cefepime  1,000 mg Intravenous Daily Awa Russian, DO Stopped (06/28/21 1087)    cinacalcet  30 mg Oral Daily Awa Russian, DO      dextromethorphan-guaiFENesin  10 mL Oral Q4H PRN Awa Russian, DO      doxercalciferol  2 mcg Intravenous After Dialysis Lane Roland MD      gabapentin  300 mg Oral Daily Awa Russian, DO      guaiFENesin  600 mg Oral Q12H Awa Russian, DO      heparin (porcine)  3-30 Units/kg/hr (Order-Specific) Intravenous Titrated Radha Smart MD Stopped (06/29/21 0222)    heparin (porcine)  10,000 Units Intravenous Q1H PRN Radha Smart MD      heparin (porcine)  5,000 Units Intravenous Q1H PRN Radha Smart MD      insulin glargine  20 Units Subcutaneous HS Awa Russian, DO      insulin lispro  1-5 Units Subcutaneous HS Awa Russian, DO      insulin lispro  2-12 Units Subcutaneous TID AC Awa Russian, DO      levothyroxine  50 mcg Oral Early Morning Awa Russian, DO      lidocaine  1 patch Topical Daily Awa Russian, DO      loratadine  10 mg Oral Daily Awa Russian, DO      metoprolol  2 5 mg Intravenous Q6H PRN [facility], DO      NIFEdipine ER  30 mg Oral Daily Awa Russian, DO      ondansetron  4 mg Intravenous Q6H PRN Awa Russian, DO      pantoprazole  40 mg Oral Early Morning Awa Russian, DO      prednisoLONE acetate  1 drop Both Eyes 4x Daily Awa Russian, DO      senna  2 tablet Oral HS PRN Awa Russian, DO      sertraline  50 mg Oral Daily Awa Russian, DO      sevelamer  800 mg Oral TID With Meals Darene Petri DO Guido      sodium chloride  1 spray Each Nare TID PRN Asif Lav, DO      torsemide  100 mg Oral Q12H Asif Lav, DO      vancomycin  10 mg/kg (Adjusted) Intravenous After Dialysis Asif Lav,  mg (06/29/21 1149)    [START ON 7/4/2021] warfarin  12 mg Oral Weekly Andrew Beverly MD      warfarin  9 mg Oral Once per day on Mon Tue Wed Thu Fri Sat Andrew Beverly MD          Today, Patient Was Seen By: Linda Gutierrez DO    **Please Note: This note may have been constructed using a voice recognition system  **

## 2021-06-29 NOTE — ASSESSMENT & PLAN NOTE
Possible gram negative pneumonia  Recent admission from 6/23/21 to 6/26/21 with left upper lobe pneumonia - received Vanco and doxycycline x 1 day, Cefepime x 4 days, then discharged on cefedinir (6/26) to complete 3 more days for a total of 7 days  Presented on 6/27 with shortness of breath and temp of 101 at home  Repeat CT chest - "New right lower lobe opacity /pneumonia   Minimally improved left upper lobe consolidation /pneumonia "  No fever or leucocytosis noted after arrival  Procal - negative  Hx of MRSA 6/8  On Cefepime/Vancomycin  F/u sputum c/s, urine legionella Ag, strep pneum Ag  Recheck procalcitonin  Seen by pulm - bronchoscopy discussed with patient but she prefers to hold off for now, Abx continued as DELMI infiltrates had improved on broad spectrum antibiotics

## 2021-06-29 NOTE — PROGRESS NOTES
[facility] [facility]  Progress Note Sourav Gonzalez 1965, 49 y o  female MRN: [id number]  [facility] Encounter: [id number]  Primary Care Provider: Sarah Newton MD   Date and time admitted to hospital: 6/27/2021  3:20 PM    * Pneumonia  Assessment & Plan  Possible gram negative pneumonia  Recent admission from 6/23/21 to 6/26/21 with left upper lobe pneumonia - received Vanco and doxycycline x 1 day, Cefepime x 4 days, then discharged on cefedinir (6/26) to complete 3 more days for a total of 7 days  Presented on 6/27 with shortness of breath and temp of 101 at home  Repeat CT chest - "New right lower lobe opacity /pneumonia   Minimally improved left upper lobe consolidation /pneumonia "  No fever or leucocytosis noted after arrival  Procal - negative  Hx of MRSA 6/8  On Cefepime/Vancomycin  F/u sputum c/s, urine legionella Ag, strep pneum Ag  Recheck procalcitonin  Seen by pulm - bronchoscopy discussed with patient but she prefers to hold off for now, Abx continued as DELMI infiltrates had improved on broad spectrum antibiotics    Type 2 diabetes mellitus [facility])  Assessment & Plan  Lab Results   Component Value Date    HGBA1C 8 4 (H) 06/11/2021       Recent Labs     06/28/21  0747 06/28/21  1236 06/28/21  1640 06/28/21 2055   POCGLU 193* 227* 231* 232*       Blood Sugar Average: Last 72 hrs:  (P) 243 4   Cont home insulin regimen  Ct SSI      History of DVT (deep vein thrombosis)/PE  Assessment & Plan  · INR subtherapeutic  · Restart home Coumadin regimen  · Heparin drip till INR therapeutic     ESRD on hemodialysis [facility])  Assessment & Plan  Lab Results   Component Value Date    EGFR 6 06/28/2021    EGFR 6 06/27/2021    EGFR 5 06/24/2021    CREATININE 7 57 (H) 06/28/2021    CREATININE 6 80 (H) 06/27/2021    CREATININE 8 87 (H) 06/24/2021   On HD T/Th/Sat  Nephrology following    Hypertension  Assessment & Plan  Continue home regimen  Monitor BP - acceptable at present  Nifedipine had been added to regimen during recent admission    Hypothyroidism  Assessment & Plan  TSH 0 736 on   Cont levothyroxine 50 mcg daily    Morbid obesity (HCC)  Assessment & Plan  Lifestyle/dietary modification          VTE Pharmacologic Prophylaxis: VTE Score: 7 High Risk (Score >/= 5) - Pharmacological DVT Prophylaxis Ordered: heparin drip  Sequential Compression Devices Ordered  Patient Centered Rounds: I performed bedside rounds with nursing staff today  Education and Discussions with Family / Patient: Updated  (boyfriend) via phone  Time Spent for Care: 20 minutes  More than 50% of total time spent on counseling and coordination of care as described above  Current Length of Stay: 1 day(s)  Current Patient Status: Inpatient   Certification Statement: The patient will continue to require additional inpatient hospital stay due to Pneumonia requiring IV antibiotics     Code Status: Level 1 - Full Code    Subjective:   Shortness of breath better  Intermittent dry cough  No fever  Objective:     Vitals:   Temp (24hrs), Av °F (36 7 °C), Min:97 8 °F (36 6 °C), Max:98 2 °F (36 8 °C)    Temp:  [97 8 °F (36 6 °C)-98 2 °F (36 8 °C)] 98 2 °F (36 8 °C)  HR:  [74-81] 74  Resp:  [18-20] 18  BP: (121-138)/(54-67) 138/67  SpO2:  [96 %-97 %] 96 %  Body mass index is 44 39 kg/m²  Physical Exam:   Physical Exam  Vitals reviewed  HENT:      Nose: Nose normal       Mouth/Throat:      Mouth: Mucous membranes are moist    Eyes:      Extraocular Movements: Extraocular movements intact  Cardiovascular:      Rate and Rhythm: Normal rate and regular rhythm  Pulmonary:      Effort: Pulmonary effort is normal       Breath sounds: Normal breath sounds  No wheezing  Abdominal:      General: Bowel sounds are normal  There is no distension  Palpations: Abdomen is soft  Tenderness: There is no abdominal tenderness  Musculoskeletal:      Cervical back: Neck supple  Skin:     General: Skin is warm and dry  Neurological:      General: No focal deficit present  Mental Status: She is alert and oriented to person, place, and time  Psychiatric:         Mood and Affect: Mood normal          Behavior: Behavior normal           Additional Data:     Labs:  Results from last 7 days   Lab Units 06/28/21  0513 06/27/21  1559   WBC Thousand/uL 7 75 8 96   HEMOGLOBIN g/dL 8 6* 9 3*   HEMATOCRIT % 26 9* 27 6*   PLATELETS Thousands/uL 191 228   NEUTROS PCT %  --  74   LYMPHS PCT %  --  16   MONOS PCT %  --  7   EOS PCT %  --  1     Results from last 7 days   Lab Units 06/28/21  0513 06/27/21  1559   SODIUM mmol/L 137 135*   POTASSIUM mmol/L 4 8 5 1   CHLORIDE mmol/L 102 100   CO2 mmol/L 27 26   BUN mg/dL 57* 45*   CREATININE mg/dL 7 57* 6 80*   ANION GAP mmol/L 8 9   CALCIUM mg/dL 8 7 8 4   ALBUMIN g/dL  --  2 9*   TOTAL BILIRUBIN mg/dL  --  0 49   ALK PHOS U/L  --  198*   ALT U/L  --  23   AST U/L  --  13   GLUCOSE RANDOM mg/dL 175* 293*     Results from last 7 days   Lab Units 06/28/21  0526   INR  1 36*     Results from last 7 days   Lab Units 06/28/21  2055 06/28/21  1640 06/28/21  1236 06/28/21  0747 06/27/21  2221 06/26/21  1249 06/26/21  0616 06/25/21  2223 06/25/21  1610 06/25/21  1048 06/25/21  0603 06/25/21  0103   POC GLUCOSE mg/dl 232* 231* 227* 193* 334* 138 164* 353* 190* 167* 191* 238*         Results from last 7 days   Lab Units 06/28/21  1230 06/25/21  0514 06/24/21  0456   PROCALCITONIN ng/ml 0 17 0 26* 0 27*       Lines/Drains:  Invasive Devices     Peripheral Intravenous Line            Peripheral IV 06/27/21 Distal;Right;Ventral (anterior) Forearm 1 day    Peripheral IV 06/28/21 Right Forearm <1 day          Line            Hemodialysis AV Fistula 02/20/18 Left Forearm 1224 days              Recent Cultures (last 7 days):   Results from last 7 days   Lab Units 06/28/21  0826 06/27/21  1910   BLOOD CULTURE   --  No Growth at 24 hrs     GRAM STAIN RESULT  2+ Epithelial cells per low power field*  1+ Polys*  1+ Gram positive rods*  1+ Gram negative rods*  1+ Gram positive cocci in pairs*  --        Last 24 Hours Medication List:   Current Facility-Administered Medications   Medication Dose Route Frequency Provider Last Rate    acetaminophen  650 mg Oral Q6H PRN Darlen Pummel, DO      ALPRAZolam  0 25 mg Oral BID PRN Darlen Pummel, DO      aluminum-magnesium hydroxide-simethicone  30 mL Oral Q6H PRN Darlen Pummel, DO      atorvastatin  20 mg Oral QPM Darlen Pummel, DO      atropine  1 drop Both Eyes HS Darlen Pummel, DO      brimonidine  1 drop Both Eyes BID Darlen Pummel, DO      Calcium Carbonate Antacid  1,250 mg Oral Daily Darlen Pummel, DO      carvedilol  25 mg Oral BID With Meals Darlen Pummel, DO      cefepime  1,000 mg Intravenous Daily Darlen Pummel, DO 1,000 mg (06/28/21 2227)    cinacalcet  30 mg Oral Daily Darlen Pummel, DO      dextromethorphan-guaiFENesin  10 mL Oral Q4H PRN Darzhane Pummel, DO      doxercalciferol  2 mcg Intravenous After Dialysis Tito Balderas MD      gabapentin  300 mg Oral Daily Darlen Pummel, DO      guaiFENesin  600 mg Oral Q12H Darlen Pummel, DO      heparin (porcine)  3-30 Units/kg/hr (Order-Specific) Intravenous Titrated Harvie Goodell, MD Stopped (06/28/21 4060)    heparin (porcine)  10,000 Units Intravenous Q1H PRN Harvie Goodell, MD      heparin (porcine)  5,000 Units Intravenous Q1H PRN Harvie Goodell, MD      insulin glargine  20 Units Subcutaneous HS Darlen Pummel, DO      insulin lispro  1-5 Units Subcutaneous HS Darlen Pummel, DO      insulin lispro  2-12 Units Subcutaneous TID AC Darlen Pummel, DO      levothyroxine  50 mcg Oral Early Morning Darlen Pummel, DO      lidocaine  1 patch Topical Daily Darlen Pummel, DO      loratadine  10 mg Oral Daily Darlen Pummel, DO      NIFEdipine ER  30 mg Oral Daily Darlen Pummel, DO      ondansetron  4 mg Intravenous Q6H PRN Kinjal Mcdonald, DO      pantoprazole  40 mg Oral Early Morning Kinjal Mcdonald, DO      prednisoLONE acetate  1 drop Both Eyes 4x Daily Kinjal Mcdonald, DO      senna  2 tablet Oral HS PRN Kinjal Mcdonald, DO      sertraline  50 mg Oral Daily Kinjal Mcdonald, DO      sevelamer  800 mg Oral TID With Meals Kinjal Mcdonald,       sodium chloride  1 spray Each Nare TID PRN Kinjal Mcdonald, DO      torsemide  100 mg Oral Q12H Kinjal Mcdonald DO      [START ON 6/29/2021] vancomycin  10 mg/kg (Adjusted) Intravenous After Dialysis Kinjal Mcdonald DO      [START ON 7/4/2021] warfarin  12 mg Oral Weekly Jacinta Murray MD      warfarin  9 mg Oral Once per day on Mon Tue Wed Thu Fri Sat Jacinta Murray MD          Today, Patient Was Seen By: Jacinta Murray MD    **Please Note: This note may have been constructed using a voice recognition system  **

## 2021-06-29 NOTE — ASSESSMENT & PLAN NOTE
Continue home regimen  Monitor BP - acceptable at present  Nifedipine had been added to regimen during recent admission

## 2021-06-29 NOTE — PROCEDURES
NEPHROLOGY DIALYSIS PROCEDURE NOTE      Patient seen and examined on Hemodialysis, tolerating procedure well  All documentation, labs, medications were reviewed by myself, and the treatment plan was reviewed with nurse and patient  Seen on Dialysis at : 10:51 AM  Dialysis Access: Left Arm AV Fistula  Vitals: 173/100  Dialysis time: 4 hours  Dialyzer: F180  Sodium bath: 138  Potassium bath: 2 K+  Bicarbonate bath: 30  Calcium bath: 2 5  Ultrafiltration: Aim for EDW  Blood flow rate: 400 cc/min  Dialysis flow rate: 600 cc/min  Dialysis temperature: 36C  Medications given on HD:  Hectorol 2 mcg, vancomycin 750 mg    Assessment/Plan:    End Stage Renal Disease  -Modality:In-Center Hemodialysis  -Schedule: Tuesday/Thursday/Saturday  -Dialysis Unit: [address]  -Access: Left Arm AV Fistula  -Presciption:  240 minutes, dry weight 124 5 kg, blood flow rate 400 cc/minute, dialysis flow rate 600 cc/minute, 2 potassium, 3 calcium, 35 bicarbonate, 138 sodium  -Status:  seen on dialysis, blood pressure high  -Plan:   · Seen on dialysis today  Plan for next dialysis on Thursday  · Dose vancomycin for dialysis     Mineral bone disorder-chronic kidney disease  --renal diet  --Hectorol 2 mcg with dialysis  --Sensipar 30 mg daily  --Nephrocaps 1 capsule daily  --sevelamer 3 tablets p o  t i d  With meals and 1 with snacks     Bilateral bacterial pneumonia  --management as per the internal medicine team  --dose antibiotics for dialysis  --dose vancomycin based on level, pharmacy assistance      Hypertension  --blood pressure at target  --does not examine volume overloaded     Anemia of chronic kidney disease  --hemoglobin below target  --not on Epogen due to history of pulmonary embolism    Review of Systems: The entire 12 point ROS has been reviewed      Physical Exam:    General:  Awake, no acute distress  Skin:  No rashes no lesions  CVS:  S1-S2 appreciated  Lungs:  Good air entry  Abdomen:  Nontender nondistended  Access:  Left arm AV fistula  Extremities:  Positive edema more so on the left than the right  Neuro:  No asterixis          Current Facility-Administered Medications:     acetaminophen (TYLENOL) tablet 650 mg, 650 mg, Oral, Q6H PRN, Jewel Catena, DO, 650 mg at 06/29/21 4372    ALPRAZolam Faina Bump) tablet 0 25 mg, 0 25 mg, Oral, BID PRN, Jewel Catena, DO, 0 25 mg at 06/29/21 0827    aluminum-magnesium hydroxide-simethicone (MYLANTA) oral suspension 30 mL, 30 mL, Oral, Q6H PRN, Jewel Catena, DO    atorvastatin (LIPITOR) tablet 20 mg, 20 mg, Oral, QPM, Jewel Catena, DO, 20 mg at 06/28/21 1717    atropine (ISOPTO ATROPINE) 1 % ophthalmic solution 1 drop, 1 drop, Both Eyes, HS, Jewel Catena, DO, 1 drop at 06/28/21 2228    brimonidine (ALPHAGAN P) 0 15 % ophthalmic solution 1 drop, 1 drop, Both Eyes, BID, Jewel Catena, DO, 1 drop at 06/28/21 1717    Calcium Carbonate Antacid oral suspension 1,250 mg, 1,250 mg, Oral, Daily, Jewel Catena, DO, 1,250 mg at 06/29/21 7011    carvedilol (COREG) tablet 25 mg, 25 mg, Oral, BID With Meals, Jewel Catena, DO, 25 mg at 06/28/21 1717    cefepime (MAXIPIME) 1,000 mg in dextrose 5 % 50 mL IVPB, 1,000 mg, Intravenous, Daily, Jewel Catena, DO, Stopped at 06/28/21 2257    cinacalcet (SENSIPAR) tablet 30 mg, 30 mg, Oral, Daily, Jewel Catena, DO, 30 mg at 06/29/21 6076    dextromethorphan-guaiFENesin (ROBITUSSIN DM) oral syrup 10 mL, 10 mL, Oral, Q4H PRN, Jewel Catena, DO, 10 mL at 06/27/21 2212    doxercalciferol (HECTOROL) injection 2 mcg, 2 mcg, Intravenous, After Dialysis, Merle Mayes MD, 2 mcg at 06/29/21 1049    gabapentin (NEURONTIN) capsule 300 mg, 300 mg, Oral, Daily, Jewel Catena, DO, 300 mg at 06/28/21 1232    guaiFENesin (MUCINEX) 12 hr tablet 600 mg, 600 mg, Oral, Q12H, Jewel Catena, DO, 600 mg at 06/29/21 0824    heparin (porcine) 25,000 units in 0 45% NaCl 250 mL infusion (premix), 3-30 Units/kg/hr (Order-Specific), Intravenous, Titrated, Randy Hart MD, Stopped at 06/29/21 0222    heparin (porcine) injection 10,000 Units, 10,000 Units, Intravenous, Q1H PRN, Randy Hart MD    heparin (porcine) injection 5,000 Units, 5,000 Units, Intravenous, Q1H PRN, Randy Hart MD    insulin glargine (LANTUS) subcutaneous injection 20 Units 0 2 mL, 20 Units, Subcutaneous, HS, Mikki Field, DO, 20 Units at 06/28/21 2227    insulin lispro (HumaLOG) 100 units/mL subcutaneous injection 1-5 Units, 1-5 Units, Subcutaneous, HS, Mikki Field, DO, 2 Units at 06/28/21 2228    insulin lispro (HumaLOG) 100 units/mL subcutaneous injection 2-12 Units, 2-12 Units, Subcutaneous, TID AC, 4 Units at 06/28/21 1718 **AND** Fingerstick Glucose (POCT), , , TID AC, Mikki Field, DO    levothyroxine tablet 50 mcg, 50 mcg, Oral, Early Morning, Mikki Field, DO, 50 mcg at 06/29/21 6492    lidocaine (LIDODERM) 5 % patch 1 patch, 1 patch, Topical, Daily, Mikki Field, DO    loratadine (CLARITIN) tablet 10 mg, 10 mg, Oral, Daily, Mikki Field, DO, 10 mg at 06/29/21 3379    NIFEdipine (PROCARDIA XL) 24 hr tablet 30 mg, 30 mg, Oral, Daily, Mikki Field, DO, 30 mg at 06/28/21 0813    ondansetron (ZOFRAN) injection 4 mg, 4 mg, Intravenous, Q6H PRN, Mikki Field, DO    pantoprazole (PROTONIX) EC tablet 40 mg, 40 mg, Oral, Early Morning, Mikki Field, DO, 40 mg at 06/29/21 4213    prednisoLONE acetate (PRED FORTE) 1 % ophthalmic suspension 1 drop, 1 drop, Both Eyes, 4x Daily, Mikki Field, DO, 1 drop at 06/29/21 0830    senna (SENOKOT) tablet 17 2 mg, 2 tablet, Oral, HS PRN, Mikki Field, DO, 17 2 mg at 06/27/21 2213    sertraline (ZOLOFT) tablet 50 mg, 50 mg, Oral, Daily, Mikki , , 50 mg at 06/29/21 0279    sevelamer (RENAGEL) tablet 800 mg, 800 mg, Oral, TID With Meals, Mikkisoni Jane, , 800 mg at 06/29/21 0824    sodium chloride (OCEAN) 0 65 % nasal spray 1 spray, 1 spray, Each Nare, TID PRN, Misael Carter DO    torsemide [facility]) tablet 100 mg, 100 mg, Oral, Q12H, Misael Carter DO, 100 mg at 06/28/21 2227    vancomycin (VANCOCIN) IVPB (premix in dextrose) 750 mg 150 mL, 10 mg/kg (Adjusted), Intravenous, After Dialysis, Misael Carter DO    [START ON 7/4/2021] warfarin (COUMADIN) tablet 12 mg, 12 mg, Oral, Weekly, Vicky Allred MD    warfarin (COUMADIN) tablet 9 mg, 9 mg, Oral, Once per day on Mon Tue Wed Thu Fri Sat, Vicky Allred MD, 9 mg at 06/28/21 1724

## 2021-06-29 NOTE — PROGRESS NOTES
Progress Note - Pulmonary   Yeyo Rodrigez 49 y o  female MRN: [id number]  Unit/Bed#: [facility] 920-01 Encounter: [id number]      Assessment:  1   recurrent HAP, bacterial vs viral, possibly organizing pneumonia  2   end-stage renal disease on hemodialysis  3  insulin-dependent diabetes  4  DVT on Coumadin  5   morbid obesity, BMI 44    Plan:  ·  Agree with continuation of antibiotics despite negative procalcitonin, follow up sputum culture, blood cultures, MRSA nares  Will hold off on bronchoscopy for now  · Left upper lobe consolidation did appear to improve with antibiotic therapy, right lower lobe opacity is new  ·  hopeful to narrow antibiotics and switch to possibly p o  once sputum culture returns  ·  continue to monitor SpO2, currently on room air, at of bed to chair, incentive spirometry, pulmonary toilet, goal SpO2 greater than 90%    Subjective:    patient seen examined at bedside  Does state that she feels short of breath with exertion, intermittently coughing up yellow phlegm  Denies fevers or chills  Feels tired  Review of Systems   Constitutional: Positive for fatigue  Negative for chills, fever and unexpected weight change  HENT: Negative for congestion, rhinorrhea, sneezing and sore throat  Respiratory: Positive for cough and shortness of breath  Negative for wheezing  Cardiovascular: Negative for chest pain, palpitations and leg swelling  Gastrointestinal: Negative for abdominal pain, constipation, diarrhea, nausea and vomiting  Genitourinary: Negative for dysuria  Musculoskeletal: Negative for arthralgias  Neurological: Negative for dizziness and numbness         Objective:     Vitals:    06/28/21 0743 06/28/21 1503 06/28/21 2207 06/29/21 0720   BP: 121/59 123/54 138/67 137/74   Pulse: 81 77 74 77   Resp: 20 18 18 18   Temp: 98 °F (36 7 °C) 97 8 °F (36 6 °C) 98 2 °F (36 8 °C) 98 °F (36 7 °C)   TempSrc:       SpO2: 96% 97% 96% 97%   Weight:               Intake/Output Summary (Last 24 hours) at 6/29/2021 0824  Last data filed at 6/29/2021 0301  Gross per 24 hour   Intake 638 76 ml   Output 25 ml   Net 613 76 ml         Physical Exam  Constitutional:       General: She is not in acute distress  Appearance: She is well-developed  She is obese  She is not diaphoretic  HENT:      Head: Normocephalic and atraumatic  Mouth/Throat:      Mouth: Mucous membranes are moist       Pharynx: Oropharynx is clear  No oropharyngeal exudate  Eyes:      General: No scleral icterus  Cardiovascular:      Rate and Rhythm: Normal rate and regular rhythm  Heart sounds: Normal heart sounds  No murmur heard  Pulmonary:      Effort: Pulmonary effort is normal  No respiratory distress  Breath sounds: No wheezing  Comments: Few rales at the left base, otherwise clear  Abdominal:      General: Bowel sounds are normal  There is no distension  Palpations: Abdomen is soft  Tenderness: There is no abdominal tenderness  Musculoskeletal:      Right lower leg: No edema  Left lower leg: No edema  Neurological:      Mental Status: She is alert and oriented to person, place, and time  Labs: I have personally reviewed pertinent lab results    Results from last 7 days   Lab Units 06/29/21  0616 06/28/21  0513 06/27/21  1559 06/24/21  0457 06/23/21  1310   WBC Thousand/uL 8 54 7 75 8 96 8 59 10 44*   HEMOGLOBIN g/dL 8 7* 8 6* 9 3* 8 4* 8 9*   HEMATOCRIT % 26 4* 26 9* 27 6* 25 7* 27 4*   PLATELETS Thousands/uL 178 191 228 198 214   NEUTROS PCT %  --   --  74 87* 82*   MONOS PCT %  --   --  7 6 7      Results from last 7 days   Lab Units 06/28/21  0513 06/27/21  1559 06/24/21  0456 06/23/21  1253   POTASSIUM mmol/L 4 8 5 1 5 0 4 8   CHLORIDE mmol/L 102 100 94* 97*   CO2 mmol/L 27 26 25 27   BUN mg/dL 57* 45* 66* 47*   CREATININE mg/dL 7 57* 6 80* 8 87* 7 47*   CALCIUM mg/dL 8 7 8 4 7 7* 8 8   ALK PHOS U/L  --  198* 153* 184*   ALT U/L  --  23 19 23   AST U/L  --  13 10 16 Results from last 7 days   Lab Units 06/28/21  0513 06/24/21  0456   MAGNESIUM mg/dL 2 2 1 9     Results from last 7 days   Lab Units 06/24/21  0456   PHOSPHORUS mg/dL 4 5      Results from last 7 days   Lab Units 06/29/21  0616 06/28/21  2114 06/28/21  1230 06/28/21  0526 06/24/21  0456   INR  1 40*  --   --  1 36* 1 92*   PTT seconds 41* >210* 37  --  50*         0   Lab Value Date/Time    TROPONINI <0 02 06/28/2021 0526    TROPONINI <0 02 06/27/2021 1919    TROPONINI <0 02 06/27/2021 1559    TROPONINI <0 02 06/23/2021 1253    TROPONINI <0 02 06/08/2021 2116    TROPONINI <0 02 06/08/2021 1728    TROPONINI <0 02 06/08/2021 0759    TROPONINI <0 02 02/20/2020 3814    TROPONINI <0 02 09/28/2019 2007    TROPONINI <0 02 09/28/2019 1713    TROPONINI <0 02 09/24/2019 0647    TROPONINI <0 02 05/17/2019 0533    TROPONINI <0 02 05/17/2019 0241    TROPONINI <0 02 05/16/2019 2339    TROPONINI <0 02 04/27/2019 0314    TROPONINI <0 02 04/27/2019 0022    TROPONINI <0 02 04/26/2019 2007    TROPONINI <0 02 05/22/2018 1853    TROPONINI <0 02 09/09/2017 0812    TROPONINI <0 02 06/30/2016 2229    TROPONINI <0 02 01/26/2016 1236    TROPONINI <0 02 01/26/2016 0724    TROPONINI 0 02 01/26/2016 0025    TROPONINI <0 02 09/29/2015 2208    TROPONINI <0 04 09/10/2014 1055    TROPONINI <0 04 09/10/2014 0155    TROPONINI <0 04 08/01/2014 2202    TROPONINI <0 04 08/01/2014 0636    TROPONINI <0 04 07/31/2014 2329    TROPONINI <0 04 07/13/2014 1200    TROPONINI <0 04 07/13/2014 0547       Microbiology:    Sputum culture pending, blood cultures no growth, COVID negative   MRSA  pending    Imaging and other studies: I have personally reviewed pertinent reports     and I have personally reviewed pertinent films in PACS   CT chest 6/27   new right lower lobe opacity, minimally improved left upper lobe consolidation        Michelle Patel MD  Pulmonary & Critical Care Fellow, [facility]

## 2021-06-29 NOTE — ASSESSMENT & PLAN NOTE
Possible gram negative pneumonia  Recent admission from 6/23/21 to 6/26/21 with left upper lobe pneumonia - received Vanco and doxycycline x 1 day, Cefepime x 4 days, then discharged on cefedinir (6/26) to complete 3 more days for a total of 7 days  Presented on 6/27 with shortness of breath and temp of 101 at home  Repeat CT chest - "New right lower lobe opacity /pneumonia   Minimally improved left upper lobe consolidation /pneumonia "  No fever or leucocytosis noted after arrival  Procal - negative  Hx of MRSA 6/8  On Cefepime/Vancomycin  F/u sputum c/s,   Recheck procalcitonin  Seen by pulm - rule out recurrent HAP, bacterial versus viral versus possible organizing pneumonia,  bronchoscopy discussed with patient but she prefers to hold off for now, Abx continued as DELMI infiltrates had improved on broad spectrum antibiotics

## 2021-06-29 NOTE — PLAN OF CARE
Pt received in room, no distress is present  Goal set at 4 0kg  Report from [address], RN    Post-Dialysis RN Treatment Note    Blood Pressure:  Pre   216/103   mm/Hg  Post  116/81  72  mmHg   EDW  124 1   kg    Weight:  Pre 129 5    kg   Post 126 2  kg   Mode of weight measurement: Scale standing   Volume Removed  3300 ml    Treatment duration 4 0 minutes    NS given     Treatment shortened?     Medications given during Rx --- Hectorol 2mcg   Estimated Kt/V  1 63   Access type: LAVF   Access Status:  Patent   Report called to primary nurse   [id number] RN        Problem: METABOLIC, FLUID AND ELECTROLYTES - ADULT  Goal: Electrolytes maintained within normal limits  Description: INTERVENTIONS:  - Monitor labs and assess patient for signs and symptoms of electrolyte imbalances  - Administer electrolyte replacement as ordered  - Monitor response to electrolyte replacements, including repeat lab results as appropriate  - Instruct patient on fluid and nutrition as appropriate  6/29/2021 1016 by Liane Dao RN  Outcome: Progressing  6/29/2021 1015 by Liane Dao RN  Outcome: Progressing  Goal: Fluid balance maintained  Description: INTERVENTIONS:  - Monitor labs   - Monitor I/O and WT  - Instruct patient on fluid and nutrition as appropriate  - Assess for signs & symptoms of volume excess or deficit  Outcome: Progressing

## 2021-06-30 LAB
APTT PPP: 100 SECONDS (ref 23–37)
APTT PPP: 131 SECONDS (ref 23–37)
BACTERIA SPT RESP CULT: ABNORMAL
GLUCOSE SERPL-MCNC: 101 MG/DL (ref 65–140)
GLUCOSE SERPL-MCNC: 144 MG/DL (ref 65–140)
GLUCOSE SERPL-MCNC: 159 MG/DL (ref 65–140)
GLUCOSE SERPL-MCNC: 161 MG/DL (ref 65–140)
GRAM STN SPEC: ABNORMAL
INR PPP: 1.31 (ref 0.84–1.19)
PROTHROMBIN TIME: 16.3 SECONDS (ref 11.6–14.5)

## 2021-06-30 PROCEDURE — 99233 SBSQ HOSP IP/OBS HIGH 50: CPT | Performed by: INTERNAL MEDICINE

## 2021-06-30 PROCEDURE — 85610 PROTHROMBIN TIME: CPT | Performed by: PHYSICIAN ASSISTANT

## 2021-06-30 PROCEDURE — 82948 REAGENT STRIP/BLOOD GLUCOSE: CPT

## 2021-06-30 PROCEDURE — 99232 SBSQ HOSP IP/OBS MODERATE 35: CPT | Performed by: INTERNAL MEDICINE

## 2021-06-30 PROCEDURE — 85730 THROMBOPLASTIN TIME PARTIAL: CPT | Performed by: INTERNAL MEDICINE

## 2021-06-30 RX ORDER — TRAMADOL HYDROCHLORIDE 50 MG/1
50 TABLET ORAL 2 TIMES DAILY
Status: DISCONTINUED | OUTPATIENT
Start: 2021-06-30 | End: 2021-07-03 | Stop reason: HOSPADM

## 2021-06-30 RX ORDER — ONDANSETRON 4 MG/1
4 TABLET, ORALLY DISINTEGRATING ORAL EVERY 6 HOURS PRN
Status: DISCONTINUED | OUTPATIENT
Start: 2021-06-30 | End: 2021-07-03 | Stop reason: HOSPADM

## 2021-06-30 RX ADMIN — LIDOCAINE 1 PATCH: 50 PATCH TOPICAL at 09:00

## 2021-06-30 RX ADMIN — SERTRALINE HYDROCHLORIDE 50 MG: 50 TABLET ORAL at 09:06

## 2021-06-30 RX ADMIN — CINACALCET 30 MG: 30 TABLET, FILM COATED ORAL at 09:11

## 2021-06-30 RX ADMIN — SEVELAMER HYDROCHLORIDE 800 MG: 800 TABLET, FILM COATED PARENTERAL at 09:08

## 2021-06-30 RX ADMIN — ATORVASTATIN CALCIUM 20 MG: 20 TABLET, FILM COATED ORAL at 18:57

## 2021-06-30 RX ADMIN — PREDNISOLONE ACETATE 1 DROP: 10 SUSPENSION/ DROPS OPHTHALMIC at 09:17

## 2021-06-30 RX ADMIN — PREDNISOLONE ACETATE 1 DROP: 10 SUSPENSION/ DROPS OPHTHALMIC at 21:22

## 2021-06-30 RX ADMIN — TRAMADOL HYDROCHLORIDE 50 MG: 50 TABLET, FILM COATED ORAL at 21:10

## 2021-06-30 RX ADMIN — GABAPENTIN 300 MG: 300 CAPSULE ORAL at 12:35

## 2021-06-30 RX ADMIN — INSULIN LISPRO 2 UNITS: 100 INJECTION, SOLUTION INTRAVENOUS; SUBCUTANEOUS at 18:52

## 2021-06-30 RX ADMIN — CEFEPIME HYDROCHLORIDE 1000 MG: 1 INJECTION, POWDER, FOR SOLUTION INTRAMUSCULAR; INTRAVENOUS at 19:43

## 2021-06-30 RX ADMIN — ACETAMINOPHEN 650 MG: 325 TABLET ORAL at 05:52

## 2021-06-30 RX ADMIN — BRIMONIDINE TARTRATE 1 DROP: 1.5 SOLUTION OPHTHALMIC at 09:18

## 2021-06-30 RX ADMIN — WARFARIN SODIUM 9 MG: 7.5 TABLET ORAL at 18:55

## 2021-06-30 RX ADMIN — CALCIUM CARBONATE 1250 MG: 1250 SUSPENSION ORAL at 09:10

## 2021-06-30 RX ADMIN — PREDNISOLONE ACETATE 1 DROP: 10 SUSPENSION/ DROPS OPHTHALMIC at 19:04

## 2021-06-30 RX ADMIN — HEPARIN SODIUM 12 UNITS/KG/HR: 10000 INJECTION, SOLUTION INTRAVENOUS at 18:58

## 2021-06-30 RX ADMIN — LEVOTHYROXINE SODIUM 50 MCG: 50 TABLET ORAL at 04:36

## 2021-06-30 RX ADMIN — ATROPINE SULFATE 1 DROP: 10 SOLUTION/ DROPS OPHTHALMIC at 19:03

## 2021-06-30 RX ADMIN — DICLOFENAC SODIUM 2 G: 10 GEL TOPICAL at 18:58

## 2021-06-30 RX ADMIN — GUAIFENESIN 600 MG: 600 TABLET, EXTENDED RELEASE ORAL at 21:11

## 2021-06-30 RX ADMIN — DICLOFENAC SODIUM 2 G: 10 GEL TOPICAL at 21:12

## 2021-06-30 RX ADMIN — CARVEDILOL 25 MG: 25 TABLET, FILM COATED ORAL at 16:04

## 2021-06-30 RX ADMIN — TORSEMIDE 100 MG: 20 TABLET ORAL at 21:09

## 2021-06-30 RX ADMIN — BRIMONIDINE TARTRATE 1 DROP: 1.5 SOLUTION OPHTHALMIC at 18:54

## 2021-06-30 RX ADMIN — PANTOPRAZOLE SODIUM 40 MG: 40 TABLET, DELAYED RELEASE ORAL at 04:36

## 2021-06-30 RX ADMIN — TRAMADOL HYDROCHLORIDE 50 MG: 50 TABLET, FILM COATED ORAL at 12:38

## 2021-06-30 RX ADMIN — ATROPINE SULFATE 1 DROP: 10 SOLUTION/ DROPS OPHTHALMIC at 21:20

## 2021-06-30 RX ADMIN — DICLOFENAC SODIUM 2 G: 10 GEL TOPICAL at 12:33

## 2021-06-30 RX ADMIN — ALUMINUM HYDROXIDE, MAGNESIUM HYDROXIDE, AND SIMETHICONE 30 ML: 200; 200; 20 SUSPENSION ORAL at 16:03

## 2021-06-30 RX ADMIN — GUAIFENESIN 600 MG: 600 TABLET, EXTENDED RELEASE ORAL at 09:10

## 2021-06-30 RX ADMIN — LORATADINE 10 MG: 10 TABLET ORAL at 09:08

## 2021-06-30 RX ADMIN — PREDNISOLONE ACETATE 1 DROP: 10 SUSPENSION/ DROPS OPHTHALMIC at 12:36

## 2021-06-30 RX ADMIN — CARVEDILOL 25 MG: 25 TABLET, FILM COATED ORAL at 09:07

## 2021-06-30 RX ADMIN — NIFEDIPINE 30 MG: 30 TABLET, FILM COATED, EXTENDED RELEASE ORAL at 09:12

## 2021-06-30 RX ADMIN — SEVELAMER HYDROCHLORIDE 800 MG: 800 TABLET, FILM COATED PARENTERAL at 12:35

## 2021-06-30 RX ADMIN — INSULIN LISPRO 2 UNITS: 100 INJECTION, SOLUTION INTRAVENOUS; SUBCUTANEOUS at 12:33

## 2021-06-30 RX ADMIN — INSULIN GLARGINE 20 UNITS: 100 INJECTION, SOLUTION SUBCUTANEOUS at 21:17

## 2021-06-30 RX ADMIN — TORSEMIDE 100 MG: 20 TABLET ORAL at 09:05

## 2021-06-30 RX ADMIN — SEVELAMER HYDROCHLORIDE 800 MG: 800 TABLET, FILM COATED PARENTERAL at 18:55

## 2021-06-30 NOTE — ASSESSMENT & PLAN NOTE
Possible gram negative pneumonia  Recent admission from 6/23/21 to 6/26/21 with left upper lobe pneumonia - received Vanco and doxycycline x 1 day, Cefepime x 4 days, then discharged on cefedinir (6/26) to complete 3 more days for a total of 7 days  Presented on 6/27 with shortness of breath and temp of 101 at home  Repeat CT chest - "New right lower lobe opacity /pneumonia   Minimally improved left upper lobe consolidation /pneumonia "  No fever or leucocytosis noted after arrival  Procal - negative  Hx of MRSA 6/8  On Cefepime/Vancomycin  Seen by pulm - rule out recurrent HAP, bacterial versus viral versus possible organizing pneumonia,  bronchoscopy discussed with patient but she prefers to hold off for now, Abx continued as DELMI infiltrates had improved on broad spectrum antibiotics  Per pulmonology plan to complete 5- 7 day course  Sputum culture with mixed respiratory adonay no significant growth of MRSA or Pseudomonas

## 2021-06-30 NOTE — PROGRESS NOTES
Vancomycin IV Pharmacy-to-Dose Consultation    Cynthia Masterson is a 49 y o  female who is currently receiving Vancomycin IV with management by the Pharmacy Consult service  Assessment/Plan:  The patient was reviewed  Patient is on dialysis  No infusion reactions were documented in the chart  Based on todays assessment, continue current vancomycin (day # 4) dosing of 750mg post HD with a plan for random level to be drawn at 0600 on 7/3  We will continue to follow the patients culture results and clinical progress daily      Marylu Samayoa, Pharmacist

## 2021-06-30 NOTE — ASSESSMENT & PLAN NOTE
Lab Results   Component Value Date    HGBA1C 8 4 (H) 06/11/2021       Recent Labs     06/29/21  1817 06/29/21 2037 06/30/21  0758 06/30/21  1059   POCGLU 115 230* 101 159*       Blood Sugar Average: Last 72 hrs:  (P) 632 [id number]   Cont home insulin regimen  Ct SSI

## 2021-06-30 NOTE — PROGRESS NOTES
NEPHROLOGY PROGRESS NOTE   Da Abreu 49 y o  female MRN: [id number]  Unit/Bed#: [facility] 920-01 Encounter: [id number]  Reason for Consult: ESRD      SUMMARY:    53-year-old female with a history of end-stage renal disease on chronic incenter hemodialysis who was recently discharged from [facility] on Saturday aware she also underwent her dialysis only to be readmitted 24 hours later for bacterial pneumonia  She was recently discharged for left lung pneumonia         ASSESSMENT and PLAN:     End Stage Renal Disease  -Modality:In-Center Hemodialysis  -Schedule: Tuesday/Thursday/Saturday  -Dialysis Unit: [facility]  -Access: Left Arm AV Fistula  -Presciption:  240 minutes, dry weight 124 5 kg, blood flow rate 400 cc/minute, dialysis flow rate 600 cc/minute, 2 potassium, 3 calcium, 35 bicarbonate, 138 sodium  -Status:  underwent hemodialysis yesterday  Has some shortness of breath on dialysis likely related to her low ammonia  Feeling much better today   -Plan:   · No plans for urgent indication for dialysis today  · Antibiotic should be dosed for dialysis  · Plan for next dialysis tomorrow     Mineral bone disorder-chronic kidney disease  --renal diet  --Hectorol 2 mcg with dialysis  --Sensipar 30 mg daily  --Nephrocaps 1 capsule daily  --sevelamer 3 tablets p o  t i d   With meals and 1 with snacks     Bilateral bacterial pneumonia  --management as per the internal medicine team  --recurrent  --respiratory panel was negative for adenovirus, Bordetella, Chlamydia pneumonia, coronavirus (not COVID-19),   Influenza, respiratory syncytial virus, influenza a/B, mycoplasma, rhino/enterovirus  --sputum culture grow doubt mixed respiratory adonay  --blood cultures are negative  --SARS-CoV-2 negative  --dose antibiotics for dialysis  --dose vancomycin based on level, pharmacy assistance      Hypertension  --blood pressure at target  --does not examine volume overloaded     Anemia of chronic kidney disease  --hemoglobin below target  --not on Epogen due to history of pulmonary embolism  --currently on heparin drip      SUBJECTIVE / INTERVAL HISTORY:    Feeling much better today  OBJECTIVE:  Current Weight: Weight - Scale: 125 kg (275 lb)  Vitals:    06/29/21 1730 06/29/21 2219 06/30/21 0758 06/30/21 0859   BP:  146/61 136/88    BP Location:       Pulse: 66 69  82   Resp: 20 18 20    Temp:  98 5 °F (36 9 °C) 98 3 °F (36 8 °C)    TempSrc:       SpO2: 99% 94%  95%   Weight:           Intake/Output Summary (Last 24 hours) at 6/30/2021 8984  Last data filed at 6/29/2021 2219  Gross per 24 hour   Intake 1080 ml   Output 4010 ml   Net -2930 ml       Review of Systems:    12 point ROS has been reviewed  Physical Exam  Constitutional:       General: She is not in acute distress  Appearance: She is well-developed  HENT:      Head: Normocephalic and atraumatic  Eyes:      General: No scleral icterus  Conjunctiva/sclera: Conjunctivae normal       Pupils: Pupils are equal, round, and reactive to light  Cardiovascular:      Rate and Rhythm: Normal rate and regular rhythm  Heart sounds: S1 normal and S2 normal  No murmur heard  No friction rub  No gallop  Pulmonary:      Effort: Pulmonary effort is normal  No respiratory distress  Breath sounds: Normal breath sounds  No wheezing or rales  Abdominal:      General: Bowel sounds are normal       Palpations: Abdomen is soft  Tenderness: There is no abdominal tenderness  There is no rebound  Musculoskeletal:         General: Normal range of motion  Cervical back: Normal range of motion and neck supple  Skin:     Findings: No rash  Neurological:      Mental Status: She is alert and oriented to person, place, and time     Psychiatric:         Behavior: Behavior normal          Medications:    Current Facility-Administered Medications:     acetaminophen (TYLENOL) tablet 650 mg, 650 mg, Oral, Q6H PRN, Conchetta Jarratt, DO, 650 mg at 06/30/21 0552    ALPRAZolam Morganza Ling) tablet 0 25 mg, 0 25 mg, Oral, BID PRN, Andreina Grams, DO, 0 25 mg at 06/29/21 1533    aluminum-magnesium hydroxide-simethicone (MYLANTA) oral suspension 30 mL, 30 mL, Oral, Q6H PRN, Andreina Grams, DO    atorvastatin (LIPITOR) tablet 20 mg, 20 mg, Oral, QPM, Andreina Grams, DO, 20 mg at 06/29/21 1821    atropine (ISOPTO ATROPINE) 1 % ophthalmic solution 1 drop, 1 drop, Both Eyes, HS, Andreina Grams, DO, 1 drop at 06/28/21 2228    brimonidine (ALPHAGAN P) 0 15 % ophthalmic solution 1 drop, 1 drop, Both Eyes, BID, Andreina Grams, DO, 1 drop at 06/30/21 3153    Calcium Carbonate Antacid oral suspension 1,250 mg, 1,250 mg, Oral, Daily, Andreina Grams, DO, 1,250 mg at 06/30/21 0910    carvedilol (COREG) tablet 25 mg, 25 mg, Oral, BID With Meals, Andreina Grams, DO, 25 mg at 06/30/21 0907    cefepime (MAXIPIME) 1,000 mg in dextrose 5 % 50 mL IVPB, 1,000 mg, Intravenous, Daily, Andreina Grams, DO, Last Rate: 100 mL/hr at 06/29/21 2139, 1,000 mg at 06/29/21 2139    cinacalcet (SENSIPAR) tablet 30 mg, 30 mg, Oral, Daily, Andreina Grams, DO, 30 mg at 06/30/21 0911    dextromethorphan-guaiFENesin (ROBITUSSIN DM) oral syrup 10 mL, 10 mL, Oral, Q4H PRN, Andreina Grams, DO, 10 mL at 06/27/21 2212    doxercalciferol (HECTOROL) injection 2 mcg, 2 mcg, Intravenous, After Dialysis, Luanne Hawk MD, 2 mcg at 06/29/21 1049    gabapentin (NEURONTIN) capsule 300 mg, 300 mg, Oral, Daily, Andreina Grams, DO, 300 mg at 06/29/21 1823    guaiFENesin (MUCINEX) 12 hr tablet 600 mg, 600 mg, Oral, Q12H, Andreina Grams, DO, 600 mg at 06/30/21 0910    heparin (porcine) 25,000 units in 0 45% NaCl 250 mL infusion (premix), 3-30 Units/kg/hr (Order-Specific), Intravenous, Titrated, Rosalind Dillon MD, Last Rate: 18 8 mL/hr at 06/30/21 0436, 15 Units/kg/hr at 06/30/21 0436    heparin (porcine) injection 10,000 Units, 10,000 Units, Intravenous, Q1H PRN, Rosalind Dillon MD    heparin (porcine) injection 5,000 Units, 5,000 Units, Intravenous, Q1H PRN, Andrew Beverly MD    insulin glargine (LANTUS) subcutaneous injection 20 Units 0 2 mL, 20 Units, Subcutaneous, HS, Asif Lav, DO, 20 Units at 06/29/21 2136    insulin lispro (HumaLOG) 100 units/mL subcutaneous injection 1-5 Units, 1-5 Units, Subcutaneous, HS, Asif Lav, DO, 2 Units at 06/29/21 2135    insulin lispro (HumaLOG) 100 units/mL subcutaneous injection 2-12 Units, 2-12 Units, Subcutaneous, TID AC, 4 Units at 06/28/21 1718 **AND** Fingerstick Glucose (POCT), , , TID AC, Asif Lav, DO    levothyroxine tablet 50 mcg, 50 mcg, Oral, Early Morning, Asif Lav, DO, 50 mcg at 06/30/21 0436    lidocaine (LIDODERM) 5 % patch 1 patch, 1 patch, Topical, Daily, Asif Lav, DO, 1 patch at 06/30/21 0900    loratadine (CLARITIN) tablet 10 mg, 10 mg, Oral, Daily, Asif Lav, DO, 10 mg at 06/30/21 0908    metoprolol (LOPRESSOR) injection 2 5 mg, 2 5 mg, Intravenous, Q6H PRN, Linda Gutierrez DO    NIFEdipine (PROCARDIA XL) 24 hr tablet 30 mg, 30 mg, Oral, Daily, Asif Lav, DO, 30 mg at 06/30/21 0912    ondansetron (ZOFRAN) injection 4 mg, 4 mg, Intravenous, Q6H PRN, Asif Lav, DO    ondansetron (ZOFRAN-ODT) dispersible tablet 4 mg, 4 mg, Oral, Q6H PRN, Linda Gutierrez DO    pantoprazole (PROTONIX) EC tablet 40 mg, 40 mg, Oral, Early Morning, Asif Lav, DO, 40 mg at 06/30/21 0436    prednisoLONE acetate (PRED FORTE) 1 % ophthalmic suspension 1 drop, 1 drop, Both Eyes, 4x Daily, Asif Lav, DO, 1 drop at 06/30/21 8716    senna (SENOKOT) tablet 17 2 mg, 2 tablet, Oral, HS PRN, Asif Lav, DO, 17 2 mg at 06/27/21 2213    sertraline (ZOLOFT) tablet 50 mg, 50 mg, Oral, Daily, Asif Lav, DO, 50 mg at 06/30/21 0906    sevelamer (RENAGEL) tablet 800 mg, 800 mg, Oral, TID With Meals, Asif Lav, DO, 800 mg at 06/30/21 0908    sodium chloride (OCEAN) 0 65 % nasal spray 1 spray, 1 spray, Each Nare, TID PRN, Awa Diane DO    torsemide [facility]) tablet 100 mg, 100 mg, Oral, Q12H, Awa Diane DO, 100 mg at 06/30/21 0905    vancomycin (VANCOCIN) IVPB (premix in dextrose) 750 mg 150 mL, 10 mg/kg (Adjusted), Intravenous, After Dialysis, Awa Diane DO, Last Rate: 150 mL/hr at 06/29/21 1149, 750 mg at 06/29/21 1149    [START ON 7/4/2021] warfarin (COUMADIN) tablet 12 mg, 12 mg, Oral, Weekly, Radha Smart MD    warfarin (COUMADIN) tablet 9 mg, 9 mg, Oral, Once per day on Mon Tue Wed Thu Fri Sat, Radha Smart MD, 9 mg at 06/29/21 1822    Laboratory Results:  Results from last 7 days   Lab Units 06/29/21  0616 06/28/21  0513 06/27/21  1559 06/24/21  0457 06/24/21  0456 06/23/21  1310 06/23/21  1253   WBC Thousand/uL 8 54 7 75 8 96 8 59  --  10 44*  --    HEMOGLOBIN g/dL 8 7* 8 6* 9 3* 8 4*  --  8 9*  --    HEMATOCRIT % 26 4* 26 9* 27 6* 25 7*  --  27 4*  --    PLATELETS Thousands/uL 178 191 228 198  --  214  --    POTASSIUM mmol/L  --  4 8 5 1  --  5 0  --  4 8   CHLORIDE mmol/L  --  102 100  --  94*  --  97*   CO2 mmol/L  --  27 26  --  25  --  27   BUN mg/dL  --  57* 45*  --  66*  --  47*   CREATININE mg/dL  --  7 57* 6 80*  --  8 87*  --  7 47*   CALCIUM mg/dL  --  8 7 8 4  --  7 7*  --  8 8   MAGNESIUM mg/dL  --  2 2  --   --  1 9  --   --    PHOSPHORUS mg/dL  --   --   --   --  4 5  --   --

## 2021-06-30 NOTE — PROGRESS NOTES
[facility]  Progress Note Abimael Garcia 1965, 49 y o  female MRN: [id number]  [facility] Encounter: [id number]  Primary Care Provider: Mary Grover MD   Date and time admitted to hospital: 6/27/2021  3:20 PM    * Pneumonia  Assessment & Plan  Possible gram negative pneumonia  Recent admission from 6/23/21 to 6/26/21 with left upper lobe pneumonia - received Vanco and doxycycline x 1 day, Cefepime x 4 days, then discharged on cefedinir (6/26) to complete 3 more days for a total of 7 days  Presented on 6/27 with shortness of breath and temp of 101 at home  Repeat CT chest - "New right lower lobe opacity /pneumonia   Minimally improved left upper lobe consolidation /pneumonia "  No fever or leucocytosis noted after arrival  Procal - negative  Hx of MRSA 6/8  On Cefepime/Vancomycin  Seen by pulm - rule out recurrent HAP, bacterial versus viral versus possible organizing pneumonia,  bronchoscopy discussed with patient but she prefers to hold off for now, Abx continued as DELMI infiltrates had improved on broad spectrum antibiotics  Per pulmonology plan to complete 5- 7 day course  Sputum culture with mixed respiratory adonay no significant growth of MRSA or Pseudomonas    Hypertension  Assessment & Plan  Continue home regimen  Monitor BP - acceptable at present  Nifedipine had been added to regimen during recent admission    Hypothyroidism  Assessment & Plan  TSH 0 736 on 06/24  Cont levothyroxine 50 mcg daily    Morbid obesity ([address] )  Assessment & Plan  Lifestyle/dietary modification    Type 2 diabetes mellitus [facility])  Assessment & Plan  Lab Results   Component Value Date    HGBA1C 8 4 (H) 06/11/2021       Recent Labs     06/29/21  1817 06/29/21  2037 06/30/21  0758 06/30/21  1059   POCGLU 115 230* 101 159*       Blood Sugar Average: Last 72 hrs:  (P) 181 [id number]   Cont home insulin regimen  Ct SSI      ESRD on hemodialysis [facility])  Assessment & Plan  Lab Results   Component Value Date    EGFR 6 2021    EGFR 6 2021    EGFR 5 2021    CREATININE 7 57 (H) 2021    CREATININE 6 80 (H) 2021    CREATININE 8 87 (H) 2021   On HD T/Th/Sat  Nephrology following    History of DVT (deep vein thrombosis)/PE  Assessment & Plan  · INR subtherapeutic  · Continue heparin drip bridge until INR therapeutic  · Continue Coumadin      VTE Pharmacologic Prophylaxis: VTE Score: 7 Coumadin    Patient Centered Rounds: I performed bedside rounds with nursing staff today  Discussions with Specialists or Other Care Team Provider:     Education and Discussions with Family / Patient:  Patient    Time Spent for Care: 45 minutes  More than 50% of total time spent on counseling and coordination of care as described above  Current Length of Stay: 3 day(s)  Current Patient Status: Inpatient   Certification Statement: The patient will continue to require additional inpatient hospital stay due to Above  Discharge Plan: TBD    Code Status: Level 1 - Full Code    Subjective:   Patient seen and examined  Comfortable in bed  Clinically improving  Still with mild cough    Central line was placed yesterday due to poor IV access    Objective:     Vitals:   Temp (24hrs), Av 5 °F (36 9 °C), Min:98 3 °F (36 8 °C), Max:98 5 °F (36 9 °C)    Temp:  [98 3 °F (36 8 °C)-98 5 °F (36 9 °C)] 98 3 °F (36 8 °C)  HR:  [66-82] 82  Resp:  [18-20] 20  BP: (136-182)/(61-88) 136/88  SpO2:  [92 %-100 %] 95 %  Body mass index is 44 39 kg/m²  Input and Output Summary (last 24 hours):      Intake/Output Summary (Last 24 hours) at 2021 1352  Last data filed at 2021 2219  Gross per 24 hour   Intake 480 ml   Output 0 ml   Net 480 ml       Physical Exam:   Physical Exam   Patient is awake alert oriented no acute distress  Lung with decreased breath sounds bilateral  Heart positive S1-S2 no murmur  Abdomen soft nontender  Lower extremities no edema    Additional Data: Labs:  Results from last 7 days   Lab Units 06/29/21  0616 06/27/21  1559   WBC Thousand/uL 8 54 8 96   HEMOGLOBIN g/dL 8 7* 9 3*   HEMATOCRIT % 26 4* 27 6*   PLATELETS Thousands/uL 178 228   NEUTROS PCT %  --  74   LYMPHS PCT %  --  16   MONOS PCT %  --  7   EOS PCT %  --  1     Results from last 7 days   Lab Units 06/28/21  0513 06/27/21  1559   SODIUM mmol/L 137 135*   POTASSIUM mmol/L 4 8 5 1   CHLORIDE mmol/L 102 100   CO2 mmol/L 27 26   BUN mg/dL 57* 45*   CREATININE mg/dL 7 57* 6 80*   ANION GAP mmol/L 8 9   CALCIUM mg/dL 8 7 8 4   ALBUMIN g/dL  --  2 9*   TOTAL BILIRUBIN mg/dL  --  0 49   ALK PHOS U/L  --  198*   ALT U/L  --  23   AST U/L  --  13   GLUCOSE RANDOM mg/dL 175* 293*     Results from last 7 days   Lab Units 06/30/21  0442   INR  1 31*     Results from last 7 days   Lab Units 06/30/21  1059 06/30/21  0758 06/29/21  2037 06/29/21  1817 06/29/21  1641 06/29/21  1407 06/29/21  1201 06/29/21  0908 06/28/21  2055 06/28/21  1640 06/28/21  1236 06/28/21  0747   POC GLUCOSE mg/dl 159* 101 230* 115 129 148* 109 156* 232* 231* 227* 193*         Results from last 7 days   Lab Units 06/29/21  0616 06/28/21  1230 06/25/21  0514 06/24/21  0456   PROCALCITONIN ng/ml 0 14 0 17 0 26* 0 27*       Lines/Drains:  Invasive Devices     Central Venous Catheter Line            CVC Central Lines 06/29/21 Double Right Internal jugular <1 day          Line            Hemodialysis AV Fistula 02/20/18 Left Forearm 1225 days                Central Line:  Goal for removal: Will discontinue when peripheral access obtained  Imaging:  Reviewed    Recent Cultures (last 7 days):   Results from last 7 days   Lab Units 06/28/21  0826 06/27/21  1910   BLOOD CULTURE   --  No Growth at 48 hrs     SPUTUM CULTURE  3+ Growth of   --    GRAM STAIN RESULT  2+ Epithelial cells per low power field*  1+ Polys*  1+ Gram positive rods*  1+ Gram negative rods*  1+ Gram positive cocci in pairs*  --        Last 24 Hours Medication List:   Current Facility-Administered Medications   Medication Dose Route Frequency Provider Last Rate    acetaminophen  650 mg Oral Q6H PRN Trenalarisa Moore, DO      ALPRAZolam  0 25 mg Oral BID PRN Trena Moore, DO      aluminum-magnesium hydroxide-simethicone  30 mL Oral Q6H PRN Trenalarisa Moore, DO      atorvastatin  20 mg Oral QPM Trenalarisa Moore, DO      atropine  1 drop Both Eyes HS Trena Moore, DO      brimonidine  1 drop Both Eyes BID Trena Moore, DO      Calcium Carbonate Antacid  1,250 mg Oral Daily Trena Moore, DO      carvedilol  25 mg Oral BID With Meals Trena Moore, DO      cefepime  1,000 mg Intravenous Daily Trena Moore, DO 1,000 mg (06/29/21 2139)    cinacalcet  30 mg Oral Daily Trena Moore, DO      dextromethorphan-guaiFENesin  10 mL Oral Q4H PRN Trena Moore, DO      Diclofenac Sodium  2 g Topical 4x Daily Riavs Guy, DO      doxercalciferol  2 mcg Intravenous After Dialysis Carlitos Bennett MD      gabapentin  300 mg Oral Daily Trena Moore, DO      guaiFENesin  600 mg Oral Q12H Trenalarisa Moore, DO      heparin (porcine)  3-30 Units/kg/hr (Order-Specific) Intravenous Titrated Kimi Bragg MD 12 Units/kg/hr (06/30/21 1336)    heparin (porcine)  10,000 Units Intravenous Q1H PRN Kimi Bragg MD      heparin (porcine)  5,000 Units Intravenous Q1H PRN Kimi Bragg MD      insulin glargine  20 Units Subcutaneous HS Trena Moore, DO      insulin lispro  1-5 Units Subcutaneous HS Trena Moore, DO      insulin lispro  2-12 Units Subcutaneous TID AC Trena Moore, DO      levothyroxine  50 mcg Oral Early Morning Trena Moore, DO      lidocaine  1 patch Topical Daily Trena Moore, DO      loratadine  10 mg Oral Daily Trena Moore, DO      metoprolol  2 5 mg Intravenous Q6H PRN Rivas Guy, DO      NIFEdipine ER  30 mg Oral Daily Trena Moore, DO      ondansetron  4 mg Intravenous Q6H PRN Marlys River Guido, DO      ondansetron  4 mg Oral Q6H PRN Mabel Cola, DO      pantoprazole  40 mg Oral Early Morning Dinorah Buddle, DO      prednisoLONE acetate  1 drop Both Eyes 4x Daily Dinorah Buddle, DO      senna  2 tablet Oral HS PRN Dinorah Buddle, DO      sertraline  50 mg Oral Daily Dinorah Buddle, DO      sevelamer  800 mg Oral TID With Meals Dinorah Buddle, DO      sodium chloride  1 spray Each Nare TID PRN Dinorah Buddle, DO      torsemide  100 mg Oral Q12H Dinorah Buddle, DO      traMADol  50 mg Oral BID Lanny Cola, DO      vancomycin  10 mg/kg (Adjusted) Intravenous After Dialysis Dinorah Buddle,  mg (06/29/21 1149)    [START ON 7/4/2021] warfarin  12 mg Oral Weekly Saida Rodriguez MD      warfarin  9 mg Oral Once per day on Mon Tue Wed Thu Fri Sat Saida Rodriguez MD          Today, Patient Was Seen By: Lanny Alicia DO    **Please Note: This note may have been constructed using a voice recognition system  **

## 2021-06-30 NOTE — PROGRESS NOTES
Progress Note - Pulmonary   Cynthia Masterson 49 y o  female MRN: [id number]  Unit/Bed#: [facility] 920-01 Encounter: [id number]      Assessment:  1   recurrent HAP, bacterial vs viral, possibly organizing pneumonia  2   end-stage renal disease on hemodialysis  3  insulin-dependent diabetes  4  DVT on Coumadin  5   morbid obesity, BMI 44     Plan:  ·   can continue broad-spectrum antibiotics and plan to complete 5-7 day course; today is day 4  Continue to follow final sputum culture and blood culture, RP2 panel negative  · Will discuss switch to cefdinir, doxy for discharge   · Left upper lobe consolidation did appear to improve with antibiotic therapy, right lower lobe opacity  Was new  ·  continue to monitor SpO2, currently on room air, out of bed to chair, incentive spirometry, pulmonary toilet, goal SpO2 greater than 90%    Subjective:    patient seen examined at bedside  She states that she was extremely anxious yesterday and placed on oxygen although she did not desaturate  She states that her cough is improving  Does complain of fatigue  Had central line placed yesterday for  Access issues, no fevers or chills  Review of Systems   Constitutional: Positive for fatigue  Negative for chills, fever and unexpected weight change  HENT: Negative for congestion, rhinorrhea, sneezing and sore throat  Respiratory: Positive for cough and shortness of breath  Negative for wheezing  Cardiovascular: Negative for chest pain, palpitations and leg swelling  Gastrointestinal: Negative for abdominal pain, constipation, diarrhea, nausea and vomiting  Genitourinary: Negative for dysuria  Musculoskeletal: Negative for arthralgias  Neurological: Negative for dizziness and numbness         Objective:     Vitals:    06/29/21 1725 06/29/21 1730 06/29/21 2219 06/30/21 0758   BP:   146/61 136/88   BP Location:       Pulse: 67 66 69    Resp: 20 20 18 20   Temp:   98 5 °F (36 9 °C) 98 3 °F (36 8 °C)   TempSrc:       SpO2: 100% 99% 94%    Weight:               Intake/Output Summary (Last 24 hours) at 6/30/2021 0815  Last data filed at 6/29/2021 2219  Gross per 24 hour   Intake 1480 ml   Output 4010 ml   Net -2530 ml         Physical Exam  Constitutional:       General: She is not in acute distress  Appearance: She is well-developed  She is obese  She is not diaphoretic  HENT:      Head: Normocephalic and atraumatic  Mouth/Throat:      Mouth: Mucous membranes are moist       Pharynx: Oropharynx is clear  No oropharyngeal exudate  Eyes:      General: No scleral icterus  Cardiovascular:      Rate and Rhythm: Normal rate and regular rhythm  Heart sounds: Normal heart sounds  No murmur heard  Pulmonary:      Effort: Pulmonary effort is normal  No respiratory distress  Breath sounds: Normal breath sounds  No wheezing  Abdominal:      General: Bowel sounds are normal  There is no distension  Palpations: Abdomen is soft  Tenderness: There is no abdominal tenderness  Musculoskeletal:      Right lower leg: No edema  Left lower leg: No edema  Neurological:      Mental Status: She is alert and oriented to person, place, and time  Labs: I have personally reviewed pertinent lab results    Results from last 7 days   Lab Units 06/29/21  0616 06/28/21  0513 06/27/21  1559 06/24/21  0457 06/23/21  1310   WBC Thousand/uL 8 54 7 75 8 96 8 59 10 44*   HEMOGLOBIN g/dL 8 7* 8 6* 9 3* 8 4* 8 9*   HEMATOCRIT % 26 4* 26 9* 27 6* 25 7* 27 4*   PLATELETS Thousands/uL 178 191 228 198 214   NEUTROS PCT %  --   --  74 87* 82*   MONOS PCT %  --   --  7 6 7      Results from last 7 days   Lab Units 06/28/21  0513 06/27/21  1559 06/24/21  0456 06/23/21  1253   POTASSIUM mmol/L 4 8 5 1 5 0 4 8   CHLORIDE mmol/L 102 100 94* 97*   CO2 mmol/L 27 26 25 27   BUN mg/dL 57* 45* 66* 47*   CREATININE mg/dL 7 57* 6 80* 8 87* 7 47*   CALCIUM mg/dL 8 7 8 4 7 7* 8 8   ALK PHOS U/L  --  198* 153* 184*   ALT U/L  --  23 19 23   AST U/L  --  13 10 16     Results from last 7 days   Lab Units 06/28/21  0513 06/24/21  0456   MAGNESIUM mg/dL 2 2 1 9     Results from last 7 days   Lab Units 06/24/21  0456   PHOSPHORUS mg/dL 4 5      Results from last 7 days   Lab Units 06/30/21  0442 06/29/21  0616 06/28/21  2114 06/28/21  1230 06/28/21  0526   INR  1 31* 1 40*  --   --  1 36*   PTT seconds  --  41* >210* 37  --            Microbiology:    Sputum culture pending, blood cultures no growth, COVID negative   MRSA  pending     Imaging and other studies: I have personally reviewed pertinent reports     and I have personally reviewed pertinent films in PACS   CT chest 6/27   new right lower lobe opacity, minimally improved left upper lobe consolidation      Moises Sanchez MD  Pulmonary & Critical Care Fellow, [facility]

## 2021-07-01 ENCOUNTER — APPOINTMENT (INPATIENT)
Dept: DIALYSIS | Facility: HOSPITAL | Age: 56
DRG: 177 | End: 2021-07-01
Attending: INTERNAL MEDICINE
Payer: MEDICARE

## 2021-07-01 LAB
APTT PPP: 113 SECONDS (ref 23–37)
APTT PPP: 117 SECONDS (ref 23–37)
APTT PPP: 89 SECONDS (ref 23–37)
ERYTHROCYTE [DISTWIDTH] IN BLOOD BY AUTOMATED COUNT: 13.2 % (ref 11.6–15.1)
GLUCOSE SERPL-MCNC: 114 MG/DL (ref 65–140)
GLUCOSE SERPL-MCNC: 128 MG/DL (ref 65–140)
GLUCOSE SERPL-MCNC: 136 MG/DL (ref 65–140)
GLUCOSE SERPL-MCNC: 284 MG/DL (ref 65–140)
GLUCOSE SERPL-MCNC: 83 MG/DL (ref 65–140)
HCT VFR BLD AUTO: 25.1 % (ref 34.8–46.1)
HGB BLD-MCNC: 8.1 G/DL (ref 11.5–15.4)
INR PPP: 1.31 (ref 0.84–1.19)
MCH RBC QN AUTO: 30.8 PG (ref 26.8–34.3)
MCHC RBC AUTO-ENTMCNC: 32.3 G/DL (ref 31.4–37.4)
MCV RBC AUTO: 95 FL (ref 82–98)
MRSA NOSE QL CULT: NORMAL
PLATELET # BLD AUTO: 156 THOUSANDS/UL (ref 149–390)
PMV BLD AUTO: 10.9 FL (ref 8.9–12.7)
PROTHROMBIN TIME: 16.3 SECONDS (ref 11.6–14.5)
RBC # BLD AUTO: 2.63 MILLION/UL (ref 3.81–5.12)
WBC # BLD AUTO: 7.96 THOUSAND/UL (ref 4.31–10.16)

## 2021-07-01 PROCEDURE — 5A1D70Z PERFORMANCE OF URINARY FILTRATION, INTERMITTENT, LESS THAN 6 HOURS PER DAY: ICD-10-PCS | Performed by: INTERNAL MEDICINE

## 2021-07-01 PROCEDURE — 85610 PROTHROMBIN TIME: CPT | Performed by: INTERNAL MEDICINE

## 2021-07-01 PROCEDURE — 99232 SBSQ HOSP IP/OBS MODERATE 35: CPT | Performed by: INTERNAL MEDICINE

## 2021-07-01 PROCEDURE — 85730 THROMBOPLASTIN TIME PARTIAL: CPT | Performed by: INTERNAL MEDICINE

## 2021-07-01 PROCEDURE — 82948 REAGENT STRIP/BLOOD GLUCOSE: CPT

## 2021-07-01 PROCEDURE — 85027 COMPLETE CBC AUTOMATED: CPT | Performed by: INTERNAL MEDICINE

## 2021-07-01 PROCEDURE — 99233 SBSQ HOSP IP/OBS HIGH 50: CPT | Performed by: INTERNAL MEDICINE

## 2021-07-01 RX ORDER — METOCLOPRAMIDE HYDROCHLORIDE 5 MG/ML
5 INJECTION INTRAMUSCULAR; INTRAVENOUS EVERY 6 HOURS PRN
Status: DISCONTINUED | OUTPATIENT
Start: 2021-07-01 | End: 2021-07-03 | Stop reason: HOSPADM

## 2021-07-01 RX ADMIN — SEVELAMER HYDROCHLORIDE 800 MG: 800 TABLET, FILM COATED PARENTERAL at 13:47

## 2021-07-01 RX ADMIN — CEFEPIME HYDROCHLORIDE 1000 MG: 1 INJECTION, POWDER, FOR SOLUTION INTRAMUSCULAR; INTRAVENOUS at 20:29

## 2021-07-01 RX ADMIN — DICLOFENAC SODIUM 2 G: 10 GEL TOPICAL at 17:39

## 2021-07-01 RX ADMIN — BRIMONIDINE TARTRATE 1 DROP: 1.5 SOLUTION OPHTHALMIC at 17:36

## 2021-07-01 RX ADMIN — ONDANSETRON 4 MG: 2 INJECTION INTRAMUSCULAR; INTRAVENOUS at 17:44

## 2021-07-01 RX ADMIN — ACETAMINOPHEN 650 MG: 325 TABLET ORAL at 03:01

## 2021-07-01 RX ADMIN — ALPRAZOLAM 0.25 MG: 0.25 TABLET ORAL at 20:46

## 2021-07-01 RX ADMIN — ATROPINE SULFATE 1 DROP: 10 SOLUTION/ DROPS OPHTHALMIC at 23:27

## 2021-07-01 RX ADMIN — DOXERCALCIFEROL 2 MCG: 4 INJECTION, SOLUTION INTRAVENOUS at 09:47

## 2021-07-01 RX ADMIN — NIFEDIPINE 30 MG: 30 TABLET, FILM COATED, EXTENDED RELEASE ORAL at 13:47

## 2021-07-01 RX ADMIN — INSULIN GLARGINE 20 UNITS: 100 INJECTION, SOLUTION SUBCUTANEOUS at 23:35

## 2021-07-01 RX ADMIN — VANCOMYCIN HYDROCHLORIDE 750 MG: 750 INJECTION, SOLUTION INTRAVENOUS at 10:42

## 2021-07-01 RX ADMIN — ACETAMINOPHEN 650 MG: 325 TABLET ORAL at 09:46

## 2021-07-01 RX ADMIN — CALCIUM CARBONATE 1250 MG: 1250 SUSPENSION ORAL at 13:49

## 2021-07-01 RX ADMIN — TORSEMIDE 100 MG: 20 TABLET ORAL at 23:27

## 2021-07-01 RX ADMIN — HEPARIN SODIUM 7 UNITS/KG/HR: 10000 INJECTION, SOLUTION INTRAVENOUS at 17:35

## 2021-07-01 RX ADMIN — SERTRALINE HYDROCHLORIDE 50 MG: 50 TABLET ORAL at 13:48

## 2021-07-01 RX ADMIN — PREDNISOLONE ACETATE 1 DROP: 10 SUSPENSION/ DROPS OPHTHALMIC at 13:48

## 2021-07-01 RX ADMIN — GUAIFENESIN 600 MG: 600 TABLET, EXTENDED RELEASE ORAL at 13:47

## 2021-07-01 RX ADMIN — WARFARIN SODIUM 9 MG: 7.5 TABLET ORAL at 17:37

## 2021-07-01 RX ADMIN — GABAPENTIN 300 MG: 300 CAPSULE ORAL at 13:47

## 2021-07-01 RX ADMIN — SEVELAMER HYDROCHLORIDE 800 MG: 800 TABLET, FILM COATED PARENTERAL at 17:36

## 2021-07-01 RX ADMIN — ATORVASTATIN CALCIUM 20 MG: 20 TABLET, FILM COATED ORAL at 17:38

## 2021-07-01 RX ADMIN — CINACALCET 30 MG: 30 TABLET, FILM COATED ORAL at 13:46

## 2021-07-01 RX ADMIN — METOCLOPRAMIDE 5 MG: 5 INJECTION, SOLUTION INTRAMUSCULAR; INTRAVENOUS at 20:27

## 2021-07-01 RX ADMIN — DICLOFENAC SODIUM 2 G: 10 GEL TOPICAL at 13:49

## 2021-07-01 RX ADMIN — BRIMONIDINE TARTRATE 1 DROP: 1.5 SOLUTION OPHTHALMIC at 13:49

## 2021-07-01 RX ADMIN — TORSEMIDE 100 MG: 20 TABLET ORAL at 14:11

## 2021-07-01 RX ADMIN — INSULIN LISPRO 6 UNITS: 100 INJECTION, SOLUTION INTRAVENOUS; SUBCUTANEOUS at 17:35

## 2021-07-01 RX ADMIN — PANTOPRAZOLE SODIUM 40 MG: 40 TABLET, DELAYED RELEASE ORAL at 06:27

## 2021-07-01 RX ADMIN — LEVOTHYROXINE SODIUM 50 MCG: 50 TABLET ORAL at 06:27

## 2021-07-01 RX ADMIN — ALPRAZOLAM 0.25 MG: 0.25 TABLET ORAL at 07:36

## 2021-07-01 RX ADMIN — LORATADINE 10 MG: 10 TABLET ORAL at 13:48

## 2021-07-01 RX ADMIN — PREDNISOLONE ACETATE 1 DROP: 10 SUSPENSION/ DROPS OPHTHALMIC at 17:36

## 2021-07-01 RX ADMIN — PREDNISOLONE ACETATE 1 DROP: 10 SUSPENSION/ DROPS OPHTHALMIC at 23:27

## 2021-07-01 RX ADMIN — TRAMADOL HYDROCHLORIDE 50 MG: 50 TABLET, FILM COATED ORAL at 13:47

## 2021-07-01 NOTE — PROGRESS NOTES
Vancomycin IV Pharmacy-to-Dose Consultation    Mona Armenta is a 49 y o  female who is currently receiving Vancomycin IV with management by the Pharmacy Consult service  Assessment/Plan:  The patient was reviewed  Patient is on dialysis  Based on todays assessment, continue current vancomycin (day # 5) dosing of 750mg after dialysis, with a plan for random to be drawn at 0600 on 7/3/21  We will continue to follow the patients culture results and clinical progress daily      Ra Madrid, Pharmacist

## 2021-07-01 NOTE — ASSESSMENT & PLAN NOTE
Lab Results   Component Value Date    HGBA1C 8 4 (H) 06/11/2021       Recent Labs     06/30/21  2100 07/01/21  0626 07/01/21  0717 07/01/21  1258   POCGLU 144* 114 136 128       Blood Sugar Average: Last 72 hrs:  (P) 159 [id number]   Cont home insulin regimen  Ct SSI

## 2021-07-01 NOTE — PROGRESS NOTES
NEPHROLOGY PROGRESS NOTE   Jose Condon 49 y o  female MRN: [id number]  Unit/Bed#: [facility] 920-01 Encounter: [id number]  Reason for Consult: ESRD      SUMMARY:    72-year-old female with a history of end-stage renal disease on chronic incenter hemodialysis who was recently discharged from [facility] on Saturday aware she also underwent her dialysis only to be readmitted 24 hours later for bacterial pneumonia   She was recently discharged for left lung pneumonia         ASSESSMENT and PLAN:     End Stage Renal Disease  -Modality:In-Center Hemodialysis  -Schedule: Tuesday/Thursday/Saturday  -Dialysis Unit: [address]  -Access: Left Arm AV Fistula  -Presciption:  240 minutes, dry weight 124 5 kg, blood flow rate 400 cc/minute, dialysis flow rate 600 cc/minute, 2 potassium, 3 calcium, 35 bicarbonate, 138 sodium  -Status:   underwent hemodialysis this morning, 4 L was removed, receives Hectorol and vancomycin 750 mg on dialysis  No issues reported  -Plan:   · Receive 750 mg in vancomycin dialysis  · Plan for next dialysis on Saturday     Mineral bone disorder-chronic kidney disease  --renal diet  --Hectorol 2 mcg with dialysis  --Sensipar 30 mg daily  --Nephrocaps 1 capsule daily  --sevelamer 3 tablets p o  t i d   With meals and 1 with snacks     Bilateral bacterial pneumonia  --management as per the internal medicine team  --recurrent  --respiratory panel was negative for adenovirus, Bordetella, Chlamydia pneumonia, coronavirus (not COVID-19),   Influenza, respiratory syncytial virus, influenza a/B, mycoplasma, rhino/enterovirus  --sputum culture grow doubt mixed respiratory adonay  --blood cultures are negative  --SARS-CoV-2 negative  --dose antibiotics for dialysis  --dose vancomycin based on level, pharmacy assistance      Hypertension  --blood pressure at target  --does not examine volume overloaded     Anemia of chronic kidney disease  --hemoglobin below target  --not on Epogen due to history of pulmonary embolism  --currently on heparin drip      SUBJECTIVE / INTERVAL HISTORY:    No overnight events    OBJECTIVE:  Current Weight: Weight - Scale: 125 kg (275 lb)  Vitals:    07/01/21 1030 07/01/21 1100 07/01/21 1130 07/01/21 1200   BP: (!) 150/115 (!) 156/47 (!) 159/103 125/70   BP Location: Right arm Right arm Right arm    Pulse: 66 66  80   Resp: 16 16 16 16   Temp:    97 5 °F (36 4 °C)   TempSrc:    Oral   SpO2:       Weight:       Height:           Intake/Output Summary (Last 24 hours) at 7/1/2021 1301  Last data filed at 7/1/2021 1200  Gross per 24 hour   Intake 1169 9 ml   Output 4500 ml   Net -3330 1 ml       Review of Systems:    12 point ROS has been reviewed  Physical Exam  Vitals and nursing note reviewed  Constitutional:       General: She is not in acute distress  Appearance: She is well-developed  HENT:      Head: Normocephalic and atraumatic  Eyes:      General: No scleral icterus  Conjunctiva/sclera: Conjunctivae normal       Pupils: Pupils are equal, round, and reactive to light  Cardiovascular:      Rate and Rhythm: Normal rate and regular rhythm  Heart sounds: S1 normal and S2 normal  No murmur heard  No friction rub  No gallop  Pulmonary:      Effort: Pulmonary effort is normal  No respiratory distress  Breath sounds: Normal breath sounds  No wheezing or rales  Abdominal:      General: Bowel sounds are normal       Palpations: Abdomen is soft  Tenderness: There is no abdominal tenderness  There is no rebound  Musculoskeletal:         General: Normal range of motion  Cervical back: Normal range of motion and neck supple  Skin:     Findings: No rash  Neurological:      Mental Status: She is alert and oriented to person, place, and time     Psychiatric:         Behavior: Behavior normal          Medications:    Current Facility-Administered Medications:     acetaminophen (TYLENOL) tablet 650 mg, 650 mg, Oral, Q6H PRN, Joseline Acuna DO 650 mg at 07/01/21 0946    ALPRAZolam (XANAX) tablet 0 25 mg, 0 25 mg, Oral, BID PRN, Thurl Sloop, DO, 0 25 mg at 07/01/21 0736    aluminum-magnesium hydroxide-simethicone (MYLANTA) oral suspension 30 mL, 30 mL, Oral, Q6H PRN, Thurl Sloop, DO, 30 mL at 06/30/21 1603    atorvastatin (LIPITOR) tablet 20 mg, 20 mg, Oral, QPM, Thurl Sloop, DO, 20 mg at 06/30/21 1857    atropine (ISOPTO ATROPINE) 1 % ophthalmic solution 1 drop, 1 drop, Both Eyes, HS, Thurl Sloop, DO, 1 drop at 06/30/21 2120    brimonidine (ALPHAGAN P) 0 15 % ophthalmic solution 1 drop, 1 drop, Both Eyes, BID, Thurl Sloop, DO, 1 drop at 06/30/21 1854    Calcium Carbonate Antacid oral suspension 1,250 mg, 1,250 mg, Oral, Daily, Thurl Sloop, DO, 1,250 mg at 06/30/21 0910    carvedilol (COREG) tablet 25 mg, 25 mg, Oral, BID With Meals, Thurl Sloop, DO, 25 mg at 06/30/21 1604    cefepime (MAXIPIME) 1,000 mg in dextrose 5 % 50 mL IVPB, 1,000 mg, Intravenous, Daily, Thurl Sloop, DO, Last Rate: 100 mL/hr at 06/30/21 1943, 1,000 mg at 06/30/21 1943    cinacalcet (SENSIPAR) tablet 30 mg, 30 mg, Oral, Daily, Thurl Sloop, DO, 30 mg at 06/30/21 0911    dextromethorphan-guaiFENesin (ROBITUSSIN DM) oral syrup 10 mL, 10 mL, Oral, Q4H PRN, Thurl Sloop, DO, 10 mL at 06/27/21 2212    Diclofenac Sodium (VOLTAREN) 1 % topical gel 2 g, 2 g, Topical, 4x Daily, Carmel Pee, DO, 2 g at 06/30/21 2112    doxercalciferol (HECTOROL) injection 2 mcg, 2 mcg, Intravenous, After Dialysis, Jose Antnoio Hernandez MD, 2 mcg at 07/01/21 0947    gabapentin (NEURONTIN) capsule 300 mg, 300 mg, Oral, Daily, Thurl Sloop, DO, 300 mg at 06/30/21 1235    guaiFENesin (MUCINEX) 12 hr tablet 600 mg, 600 mg, Oral, Q12H, Thurl Sloop, DO, 600 mg at 06/30/21 2111    heparin (porcine) 25,000 units in 0 45% NaCl 250 mL infusion (premix), 3-30 Units/kg/hr (Order-Specific), Intravenous, Titrated, Tim Araujo MD, Last Rate: 12 5 mL/hr at 07/01/21 0358, 10 Units/kg/hr at 07/01/21 0358    heparin (porcine) injection 10,000 Units, 10,000 Units, Intravenous, Q1H PRN, Jacinta Murray MD    heparin (porcine) injection 5,000 Units, 5,000 Units, Intravenous, Q1H PRN, Jacinta Murray MD    insulin glargine (LANTUS) subcutaneous injection 20 Units 0 2 mL, 20 Units, Subcutaneous, HS, Kinjal Mcdonald DO, 20 Units at 06/30/21 2117    insulin lispro (HumaLOG) 100 units/mL subcutaneous injection 1-5 Units, 1-5 Units, Subcutaneous, HS, Kinjal Mcdonald, DO, 2 Units at 06/29/21 2135    insulin lispro (HumaLOG) 100 units/mL subcutaneous injection 2-12 Units, 2-12 Units, Subcutaneous, TID AC, 2 Units at 06/30/21 1852 **AND** Fingerstick Glucose (POCT), , , TID AC, Kinjal Mcdonald DO    levothyroxine tablet 50 mcg, 50 mcg, Oral, Early Morning, Kinjal Mcdonald, DO, 50 mcg at 07/01/21 7429    lidocaine (LIDODERM) 5 % patch 1 patch, 1 patch, Topical, Daily, Kinjal Mcdonald DO, 1 patch at 06/30/21 0900    loratadine (CLARITIN) tablet 10 mg, 10 mg, Oral, Daily, Kinjal Mcdonald DO, 10 mg at 06/30/21 0908    metoprolol (LOPRESSOR) injection 2 5 mg, 2 5 mg, Intravenous, Q6H PRN, Eder Persaud DO    NIFEdipine (PROCARDIA XL) 24 hr tablet 30 mg, 30 mg, Oral, Daily, Kinjla Mcdonald DO, 30 mg at 06/30/21 0912    ondansetron (ZOFRAN) injection 4 mg, 4 mg, Intravenous, Q6H PRN, Kinjal Mcdonald DO    ondansetron (ZOFRAN-ODT) dispersible tablet 4 mg, 4 mg, Oral, Q6H PRN, Eder Persaud, DO    pantoprazole (PROTONIX) EC tablet 40 mg, 40 mg, Oral, Early Morning, Kinjal Mcdonald DO, 40 mg at 07/01/21 5004    prednisoLONE acetate (PRED FORTE) 1 % ophthalmic suspension 1 drop, 1 drop, Both Eyes, 4x Daily, Kinjal Mcdonald DO, 1 drop at 06/30/21 2122    senna (SENOKOT) tablet 17 2 mg, 2 tablet, Oral, HS PRN, Kinjal Mcdonald DO, 17 2 mg at 06/27/21 2213    sertraline (ZOLOFT) tablet 50 mg, 50 mg, Oral, Daily, Kinjal Mcdonald DO, 50 mg at 06/30/21 9694   sevelamer (RENAGEL) tablet 800 mg, 800 mg, Oral, TID With Meals, Thurl Sloop, DO, 800 mg at 06/30/21 1855    sodium chloride (OCEAN) 0 65 % nasal spray 1 spray, 1 spray, Each Nare, TID PRN, Thurl Sloop, DO    torsemide [facility]) tablet 100 mg, 100 mg, Oral, Q12H, Thurl Sloop, DO, 100 mg at 06/30/21 2109    traMADol (ULTRAM) tablet 50 mg, 50 mg, Oral, BID, Carmel [place], DO, 50 mg at 06/30/21 2110    vancomycin (VANCOCIN) IVPB (premix in dextrose) 750 mg 150 mL, 10 mg/kg (Adjusted), Intravenous, After Dialysis, Thurl Sloop, DO, Stopped at 07/01/21 1141    [START ON 7/4/2021] warfarin (COUMADIN) tablet 12 mg, 12 mg, Oral, Weekly, Tim Araujo MD    warfarin (COUMADIN) tablet 9 mg, 9 mg, Oral, Once per day on Mon Tue Wed Thu Fri Sat, Tim Araujo MD, 9 mg at 06/30/21 1855    Laboratory Results:  Results from last 7 days   Lab Units 07/01/21  0645 06/29/21  0616 06/28/21  0513 06/27/21  1559   WBC Thousand/uL 7 96 8 54 7 75 8 96   HEMOGLOBIN g/dL 8 1* 8 7* 8 6* 9 3*   HEMATOCRIT % 25 1* 26 4* 26 9* 27 6*   PLATELETS Thousands/uL 156 178 191 228   POTASSIUM mmol/L  --   --  4 8 5 1   CHLORIDE mmol/L  --   --  102 100   CO2 mmol/L  --   --  27 26   BUN mg/dL  --   --  57* 45*   CREATININE mg/dL  --   --  7 57* 6 80*   CALCIUM mg/dL  --   --  8 7 8 4   MAGNESIUM mg/dL  --   --  2 2  --

## 2021-07-01 NOTE — PROGRESS NOTES
[facility]  Progress Note Major Silver 1965, 49 y o  female MRN: [id number]  Unit/Bed#: [facility] 920-01 Encounter: [id number]  Primary Care Provider: Ezequiel Walker MD   Date and time admitted to hospital: 6/27/2021  3:20 PM    * Pneumonia  Assessment & Plan  Recent admission from 6/23/21 to 6/26/21 with left upper lobe pneumonia - received Vanco and doxycycline x 1 day, Cefepime x 4 days, then discharged on cefedinir (6/26) to complete 3 more days for a total of 7 days  Presented on 6/27 with shortness of breath and temp of 101 at home  Repeat CT chest - "New right lower lobe opacity /pneumonia   Minimally improved left upper lobe consolidation /pneumonia "  No fever or leucocytosis noted after arrival  Procal - negative  Hx of MRSA 6/8  Seen by pulm - rule out  viral versus possible organizing pneumonia,  bronchoscopy discussed with patient but she declined initially, however she agreed on it for now for definite diagnosis of organizing pneumonia   Per pulmonology plan to complete 5- 7 day course  Sputum culture with mixed respiratory adonay no significant growth of MRSA or Pseudomonas  On IV cefepime and vancomycin per pulmonology    Hypertension  Assessment & Plan  Continue home regimen  Monitor BP - acceptable at present  Nifedipine had been added to regimen during recent admission    Hypothyroidism  Assessment & Plan  TSH 0 736 on 06/24  Cont levothyroxine 50 mcg daily    Morbid obesity ([facility] [address] )  Assessment & Plan  Lifestyle/dietary modification    Type 2 diabetes mellitus ([facility] [address] )  Assessment & Plan  Lab Results   Component Value Date    HGBA1C 8 4 (H) 06/11/2021       Recent Labs     06/30/21  2100 07/01/21  0626 07/01/21  0717 07/01/21  1258   POCGLU 144* 114 136 128       Blood Sugar Average: Last 72 hrs:  (P) 159 [id number]   Cont home insulin regimen  Ct SSI      ESRD on hemodialysis [facility])  Assessment & Plan  Lab Results   Component Value Date    EGFR 6 2021    EGFR 6 2021    EGFR 5 2021    CREATININE 7 57 (H) 2021    CREATININE 6 80 (H) 2021    CREATININE 8 87 (H) 2021   On HD T/Th/Sat  Nephrology following    History of DVT (deep vein thrombosis)/PE  Assessment & Plan  · INR subtherapeutic  · Continue heparin drip bridge until INR therapeutic  · Continue Coumadin      VTE Pharmacologic Prophylaxis: VTE Score: 7 Coumadin    Patient Centered Rounds: I performed bedside rounds with nursing staff today  Discussions with Specialists or Other Care Team Provider:     Education and Discussions with Family / Patient:  Patient    Time Spent for Care: 45 minutes  More than 50% of total time spent on counseling and coordination of care as described above  Current Length of Stay: 4 day(s)  Current Patient Status: Inpatient   Certification Statement: The patient will continue to require additional inpatient hospital stay due to Above  Discharge Plan:  Bronchoscopy tomorrow    Code Status: Level 1 - Full Code    Subjective:   Patient seen and examined  Comfortable in bed  Shortness of breath and cough improving  Still have right-sided pleuritic chest pain    Objective:     Vitals:   Temp (24hrs), Av 8 °F (36 6 °C), Min:97 5 °F (36 4 °C), Max:98 4 °F (36 9 °C)    Temp:  [97 5 °F (36 4 °C)-98 4 °F (36 9 °C)] 97 7 °F (36 5 °C)  HR:  [62-80] 76  Resp:  [16-19] 18  BP: (112-190)/() 123/49  Body mass index is 44 39 kg/m²  Input and Output Summary (last 24 hours):      Intake/Output Summary (Last 24 hours) at 2021 1457  Last data filed at 2021 1306  Gross per 24 hour   Intake 1289 9 ml   Output 4500 ml   Net -3210 1 ml       Physical Exam:   Physical Exam   Patient is awake alert oriented no acute distress  Lung with decreased breath sounds bilateral  Heart positive S1-S2 no murmur  Abdomen soft nontender  Lower extremities no edema  Additional Data:     Labs:  Results from last 7 days   Lab Units 21  0645 06/27/21  1559   WBC Thousand/uL 7 96 8 96   HEMOGLOBIN g/dL 8 1* 9 3*   HEMATOCRIT % 25 1* 27 6*   PLATELETS Thousands/uL 156 228   NEUTROS PCT %  --  74   LYMPHS PCT %  --  16   MONOS PCT %  --  7   EOS PCT %  --  1     Results from last 7 days   Lab Units 06/28/21  0513 06/27/21  1559   SODIUM mmol/L 137 135*   POTASSIUM mmol/L 4 8 5 1   CHLORIDE mmol/L 102 100   CO2 mmol/L 27 26   BUN mg/dL 57* 45*   CREATININE mg/dL 7 57* 6 80*   ANION GAP mmol/L 8 9   CALCIUM mg/dL 8 7 8 4   ALBUMIN g/dL  --  2 9*   TOTAL BILIRUBIN mg/dL  --  0 49   ALK PHOS U/L  --  198*   ALT U/L  --  23   AST U/L  --  13   GLUCOSE RANDOM mg/dL 175* 293*     Results from last 7 days   Lab Units 07/01/21  0645   INR  1 31*     Results from last 7 days   Lab Units 07/01/21  1258 07/01/21  0717 07/01/21  0626 06/30/21  2100 06/30/21  1648 06/30/21  1059 06/30/21  0758 06/29/21  2037 06/29/21  1817 06/29/21  1641 06/29/21  1407 06/29/21  1201   POC GLUCOSE mg/dl 128 136 114 144* 161* 159* 101 230* 115 129 148* 109         Results from last 7 days   Lab Units 06/29/21  0616 06/28/21  1230 06/25/21  0514   PROCALCITONIN ng/ml 0 14 0 17 0 26*       Lines/Drains:  Invasive Devices     Central Venous Catheter Line            CVC Central Lines 06/29/21 Double Right Internal jugular 1 day          Line            Hemodialysis AV Fistula 02/20/18 Left Forearm 1226 days                Central Line:  Goal for removal: Will discontinue when peripheral access obtained  Imaging:  Reviewed    Recent Cultures (last 7 days):   Results from last 7 days   Lab Units 06/28/21  0826 06/27/21  1910   BLOOD CULTURE   --  No Growth at 72 hrs     SPUTUM CULTURE  3+ Growth of   --    GRAM STAIN RESULT  2+ Epithelial cells per low power field*  1+ Polys*  1+ Gram positive rods*  1+ Gram negative rods*  1+ Gram positive cocci in pairs*  --        Last 24 Hours Medication List:   Current Facility-Administered Medications   Medication Dose Route Frequency Provider Last Rate    acetaminophen  650 mg Oral Q6H PRN Margaret Caper, DO      ALPRAZolam  0 25 mg Oral BID PRN Margaret Caper, DO      aluminum-magnesium hydroxide-simethicone  30 mL Oral Q6H PRN Margaret Caper, DO      atorvastatin  20 mg Oral QPM Margaret Caper, DO      atropine  1 drop Both Eyes HS Margaret Caper, DO      brimonidine  1 drop Both Eyes BID Margaret Caper, DO      Calcium Carbonate Antacid  1,250 mg Oral Daily Margaret Caper, DO      carvedilol  25 mg Oral BID With Meals [facility]      cefepime  1,000 mg Intravenous Daily [facility], DO 1,000 mg (06/30/21 1943)    cinacalcet  30 mg Oral Daily [facility], DO      dextromethorphan-guaiFENesin  10 mL Oral Q4H PRN Margaret Caper, DO      Diclofenac Sodium  2 g Topical 4x Daily Towana Lacer, DO      doxercalciferol  2 mcg Intravenous After Dialysis Khoa Olea MD      gabapentin  300 mg Oral Daily [facility], DO      guaiFENesin  600 mg Oral Q12H [facility], DO      heparin (porcine)  3-30 Units/kg/hr (Order-Specific) Intravenous Titrated Danelle Blas MD 10 Units/kg/hr (07/01/21 0358)    heparin (porcine)  10,000 Units Intravenous Q1H PRN Danelle Blas MD      heparin (porcine)  5,000 Units Intravenous Q1H PRN Danelle Blas MD      insulin glargine  20 Units Subcutaneous HS Margaret Caper, DO      insulin lispro  1-5 Units Subcutaneous HS Margaret Caper, DO      insulin lispro  2-12 Units Subcutaneous TID AC Margaret Caper, DO      levothyroxine  50 mcg Oral Early Morning Margaret Caper, DO      lidocaine  1 patch Topical Daily Margaret Caper, DO      loratadine  10 mg Oral Daily Margaret Caper, DO      metoprolol  2 5 mg Intravenous Q6H PRN Towana Lacer, DO      NIFEdipine ER  30 mg Oral Daily Margaret Caper, DO      ondansetron  4 mg Intravenous Q6H PRN Margaret Caper, DO      ondansetron  4 mg Oral Q6H PRN Towana Lacer, DO      pantoprazole 40 mg Oral Early Morning Misael Hazy, DO      prednisoLONE acetate  1 drop Both Eyes 4x Daily Misael Hazy, DO      senna  2 tablet Oral HS PRN Misael Hazy, DO      sertraline  50 mg Oral Daily Misael Hazy, DO      sevelamer  800 mg Oral TID With Meals Misael Hazy, DO      sodium chloride  1 spray Each Nare TID PRN Misael Hazy, DO      torsemide  100 mg Oral Q12H Misael Hazy, DO      traMADol  50 mg Oral BID Mavis English DO      vancomycin  10 mg/kg (Adjusted) Intravenous After Dialysis Misael Hazy, DO Stopped (07/01/21 1141)    [START ON 7/4/2021] warfarin  12 mg Oral Weekly Vicky Allred MD      warfarin  9 mg Oral Once per day on Mon Tue Wed Thu Fri Sat Vicky Allred MD          Today, Patient Was Seen By: Mavis English DO    **Please Note: This note may have been constructed using a voice recognition system  **

## 2021-07-01 NOTE — PROGRESS NOTES
Progress Note - Pulmonary   Carleen Torres 49 y o  female MRN: [id number]  Unit/Bed#: [facility] 920-01 Encounter: [id number]      Assessment:  1   recurrent HAP, bacterial vs viral, possibly organizing pneumonia  2   end-stage renal disease on hemodialysis  3   insulin-dependent diabetes  4   DVT on Coumadin  5   morbid obesity, BMI 44     Plan:  ·   can continue broad-spectrum antibiotics and plan to complete 5-7 day course; today is day 5     · mixed respiratory adonay negative   · Will discuss switch to cefdinir, doxy for discharge   · Left upper lobe consolidation did appear to improve with antibiotic therapy, right lower lobe opacity was new  ·  continue to monitor SpO2, currently on room air, out of bed to chair, incentive spirometry, pulmonary toilet, goal SpO2 greater than 90%    Subjective:   Patient seen/examined after HD, she feels like her cough has improved and feels overall better  Review of Systems   All other systems reviewed and are negative  Objective:     Vitals:    07/01/21 1030 07/01/21 1100 07/01/21 1130 07/01/21 1200   BP: (!) 150/115 (!) 156/47 (!) 159/103 125/70   BP Location: Right arm Right arm Right arm    Pulse: 66 66  80   Resp: 16 16 16 16   Temp:    97 5 °F (36 4 °C)   TempSrc:    Oral   SpO2:       Weight:       Height:               Intake/Output Summary (Last 24 hours) at 7/1/2021 1408  Last data filed at 7/1/2021 1306  Gross per 24 hour   Intake 1289 9 ml   Output 4500 ml   Net -3210 1 ml         Physical Exam  Vitals reviewed  Constitutional:       Appearance: She is obese  HENT:      Head: Normocephalic  Nose: Nose normal       Mouth/Throat:      Mouth: Mucous membranes are moist    Eyes:      Pupils: Pupils are equal, round, and reactive to light  Cardiovascular:      Rate and Rhythm: Normal rate and regular rhythm  Pulses: Normal pulses  Heart sounds: Normal heart sounds     Pulmonary:      Effort: Pulmonary effort is normal       Breath sounds: Normal breath sounds  Abdominal:      General: Abdomen is flat  Musculoskeletal:         General: Normal range of motion  Cervical back: Normal range of motion  Neurological:      General: No focal deficit present  Mental Status: She is alert and oriented to person, place, and time  Labs: I have personally reviewed pertinent lab results    Results from last 7 days   Lab Units 07/01/21  0645 06/29/21  0616 06/28/21  0513 06/27/21  1559   WBC Thousand/uL 7 96 8 54 7 75 8 96   HEMOGLOBIN g/dL 8 1* 8 7* 8 6* 9 3*   HEMATOCRIT % 25 1* 26 4* 26 9* 27 6*   PLATELETS Thousands/uL 156 178 191 228   NEUTROS PCT %  --   --   --  74   MONOS PCT %  --   --   --  7      Results from last 7 days   Lab Units 06/28/21  0513 06/27/21  1559   POTASSIUM mmol/L 4 8 5 1   CHLORIDE mmol/L 102 100   CO2 mmol/L 27 26   BUN mg/dL 57* 45*   CREATININE mg/dL 7 57* 6 80*   CALCIUM mg/dL 8 7 8 4   ALK PHOS U/L  --  198*   ALT U/L  --  23   AST U/L  --  13     Results from last 7 days   Lab Units 06/28/21  0513   MAGNESIUM mg/dL 2 2          Results from last 7 days   Lab Units 07/01/21  1113 07/01/21  0645 07/01/21  0325 06/30/21  1851 06/30/21  0442 06/29/21  0616   INR   --  1 31*  --   --  1 31* 1 40*   PTT seconds 113*  --  89* 100*  --  41*         0   Lab Value Date/Time    TROPONINI <0 02 06/28/2021 0526    TROPONINI <0 02 06/27/2021 1919    TROPONINI <0 02 06/27/2021 1559    TROPONINI <0 02 06/23/2021 1253    TROPONINI <0 02 06/08/2021 2116    TROPONINI <0 02 06/08/2021 1728    TROPONINI <0 02 06/08/2021 0759    TROPONINI <0 02 02/20/2020 0029    TROPONINI <0 02 09/28/2019 2007    TROPONINI <0 02 09/28/2019 1713    TROPONINI <0 02 09/24/2019 0647    TROPONINI <0 02 05/17/2019 0533    TROPONINI <0 02 05/17/2019 0241    TROPONINI <0 02 05/16/2019 2339    TROPONINI <0 02 04/27/2019 0314    TROPONINI <0 02 04/27/2019 0022    TROPONINI <0 02 04/26/2019 2007    TROPONINI <0 02 05/22/2018 1853    TROPONINI <0 02 09/09/2017 8175 Willy Maw <0 02 06/30/2016 2229    TROPONINI <0 02 01/26/2016 1236    TROPONINI <0 02 01/26/2016 0724    TROPONINI 0 02 01/26/2016 0025    TROPONINI <0 02 09/29/2015 7078    TROPONINI <0 04 09/10/2014 1055    TROPONINI <0 04 09/10/2014 0155    TROPONINI <0 04 08/01/2014 2202    TROPONINI <0 04 08/01/2014 0636    TROPONINI <0 04 07/31/2014 2329    TROPONINI <0 04 07/13/2014 1200    TROPONINI <0 04 07/13/2014 0547       Microbiology:  As above     Imaging and other studies: I have personally reviewed pertinent reports     and I have personally reviewed pertinent films in PACS        Lesley Matos MD   Pulmonary & Critical Care Fellow  [facility]

## 2021-07-01 NOTE — PLAN OF CARE
Post-Dialysis RN Treatment Note    Blood Pressure:  Pre 169/98 mm/Hg  Post 125/70 mmHg   EDW  124 1 kg    Weight:  Pre 129 0 kg   Post 124 8 kg   Mode of weight measurement: Bed Scale   Volume Removed  4000 ml    Treatment duration 240 minutes    NS given  no Treatment shortened?  No   Medications given during Rx Hectoral 2 mcg, Vancomycin 750 mg   Estimated Kt/V  1 39   Access type: AV fistula   Access Status: Yes, describe: maintained  during tx    Report called to primary nurse   Yes  Lisha Gonzalez RN  For 4Lfluid removal as tolerated, 4 hr tx, 2K bath  Problem: METABOLIC, FLUID AND ELECTROLYTES - ADULT  Goal: Electrolytes maintained within normal limits  Description: INTERVENTIONS:  - Monitor labs and assess patient for signs and symptoms of electrolyte imbalances  - Administer electrolyte replacement as ordered  - Monitor response to electrolyte replacements, including repeat lab results as appropriate  - Instruct patient on fluid and nutrition as appropriate  Outcome: Progressing  Goal: Fluid balance maintained  Description: INTERVENTIONS:  - Monitor labs   - Monitor I/O and WT  - Instruct patient on fluid and nutrition as appropriate  - Assess for signs & symptoms of volume excess or deficit  Outcome: Progressing

## 2021-07-02 LAB
APTT PPP: 141 SECONDS (ref 23–37)
APTT PPP: 48 SECONDS (ref 23–37)
APTT PPP: 49 SECONDS (ref 23–37)
BACTERIA BLD CULT: NORMAL
GLUCOSE SERPL-MCNC: 104 MG/DL (ref 65–140)
GLUCOSE SERPL-MCNC: 157 MG/DL (ref 65–140)
GLUCOSE SERPL-MCNC: 161 MG/DL (ref 65–140)
GLUCOSE SERPL-MCNC: 239 MG/DL (ref 65–140)
INR PPP: 1.37 (ref 0.84–1.19)
PROTHROMBIN TIME: 16.8 SECONDS (ref 11.6–14.5)

## 2021-07-02 PROCEDURE — 99231 SBSQ HOSP IP/OBS SF/LOW 25: CPT | Performed by: INTERNAL MEDICINE

## 2021-07-02 PROCEDURE — 99233 SBSQ HOSP IP/OBS HIGH 50: CPT | Performed by: INTERNAL MEDICINE

## 2021-07-02 PROCEDURE — 99232 SBSQ HOSP IP/OBS MODERATE 35: CPT | Performed by: INTERNAL MEDICINE

## 2021-07-02 PROCEDURE — 85730 THROMBOPLASTIN TIME PARTIAL: CPT | Performed by: INTERNAL MEDICINE

## 2021-07-02 PROCEDURE — 85610 PROTHROMBIN TIME: CPT | Performed by: INTERNAL MEDICINE

## 2021-07-02 PROCEDURE — 82948 REAGENT STRIP/BLOOD GLUCOSE: CPT

## 2021-07-02 RX ADMIN — CARVEDILOL 25 MG: 25 TABLET, FILM COATED ORAL at 17:52

## 2021-07-02 RX ADMIN — CARVEDILOL 25 MG: 25 TABLET, FILM COATED ORAL at 09:29

## 2021-07-02 RX ADMIN — TORSEMIDE 100 MG: 20 TABLET ORAL at 09:29

## 2021-07-02 RX ADMIN — DICLOFENAC SODIUM 2 G: 10 GEL TOPICAL at 17:58

## 2021-07-02 RX ADMIN — ATROPINE SULFATE 1 DROP: 10 SOLUTION/ DROPS OPHTHALMIC at 21:08

## 2021-07-02 RX ADMIN — LORATADINE 10 MG: 10 TABLET ORAL at 09:28

## 2021-07-02 RX ADMIN — DICLOFENAC SODIUM 2 G: 10 GEL TOPICAL at 09:30

## 2021-07-02 RX ADMIN — PREDNISOLONE ACETATE 1 DROP: 10 SUSPENSION/ DROPS OPHTHALMIC at 21:08

## 2021-07-02 RX ADMIN — PREDNISOLONE ACETATE 1 DROP: 10 SUSPENSION/ DROPS OPHTHALMIC at 09:28

## 2021-07-02 RX ADMIN — GABAPENTIN 300 MG: 300 CAPSULE ORAL at 12:46

## 2021-07-02 RX ADMIN — CINACALCET 30 MG: 30 TABLET, FILM COATED ORAL at 09:28

## 2021-07-02 RX ADMIN — SEVELAMER HYDROCHLORIDE 800 MG: 800 TABLET, FILM COATED PARENTERAL at 17:52

## 2021-07-02 RX ADMIN — DICLOFENAC SODIUM 2 G: 10 GEL TOPICAL at 21:14

## 2021-07-02 RX ADMIN — PREDNISOLONE ACETATE 1 DROP: 10 SUSPENSION/ DROPS OPHTHALMIC at 12:46

## 2021-07-02 RX ADMIN — SEVELAMER HYDROCHLORIDE 800 MG: 800 TABLET, FILM COATED PARENTERAL at 09:29

## 2021-07-02 RX ADMIN — HEPARIN SODIUM 8 UNITS/KG/HR: 10000 INJECTION, SOLUTION INTRAVENOUS at 20:56

## 2021-07-02 RX ADMIN — BRIMONIDINE TARTRATE 1 DROP: 1.5 SOLUTION OPHTHALMIC at 17:51

## 2021-07-02 RX ADMIN — ACETAMINOPHEN 650 MG: 325 TABLET ORAL at 05:51

## 2021-07-02 RX ADMIN — GUAIFENESIN 600 MG: 600 TABLET, EXTENDED RELEASE ORAL at 21:12

## 2021-07-02 RX ADMIN — HEPARIN SODIUM 5000 UNITS: 1000 INJECTION INTRAVENOUS; SUBCUTANEOUS at 01:41

## 2021-07-02 RX ADMIN — DICLOFENAC SODIUM 2 G: 10 GEL TOPICAL at 12:46

## 2021-07-02 RX ADMIN — NIFEDIPINE 30 MG: 30 TABLET, FILM COATED, EXTENDED RELEASE ORAL at 09:28

## 2021-07-02 RX ADMIN — LEVOTHYROXINE SODIUM 50 MCG: 50 TABLET ORAL at 05:51

## 2021-07-02 RX ADMIN — TRAMADOL HYDROCHLORIDE 50 MG: 50 TABLET, FILM COATED ORAL at 21:11

## 2021-07-02 RX ADMIN — WARFARIN SODIUM 12 MG: 5 TABLET ORAL at 17:52

## 2021-07-02 RX ADMIN — PANTOPRAZOLE SODIUM 40 MG: 40 TABLET, DELAYED RELEASE ORAL at 05:51

## 2021-07-02 RX ADMIN — CALCIUM CARBONATE 1250 MG: 1250 SUSPENSION ORAL at 09:28

## 2021-07-02 RX ADMIN — HEPARIN SODIUM 5000 UNITS: 1000 INJECTION INTRAVENOUS; SUBCUTANEOUS at 18:03

## 2021-07-02 RX ADMIN — LIDOCAINE 1 PATCH: 50 PATCH TOPICAL at 09:30

## 2021-07-02 RX ADMIN — ATORVASTATIN CALCIUM 20 MG: 20 TABLET, FILM COATED ORAL at 17:52

## 2021-07-02 RX ADMIN — INSULIN GLARGINE 20 UNITS: 100 INJECTION, SOLUTION SUBCUTANEOUS at 21:12

## 2021-07-02 RX ADMIN — INSULIN LISPRO 2 UNITS: 100 INJECTION, SOLUTION INTRAVENOUS; SUBCUTANEOUS at 21:13

## 2021-07-02 RX ADMIN — BRIMONIDINE TARTRATE 1 DROP: 1.5 SOLUTION OPHTHALMIC at 09:28

## 2021-07-02 RX ADMIN — SERTRALINE HYDROCHLORIDE 50 MG: 50 TABLET ORAL at 09:28

## 2021-07-02 RX ADMIN — PREDNISOLONE ACETATE 1 DROP: 10 SUSPENSION/ DROPS OPHTHALMIC at 17:51

## 2021-07-02 RX ADMIN — GUAIFENESIN 600 MG: 600 TABLET, EXTENDED RELEASE ORAL at 09:28

## 2021-07-02 RX ADMIN — ALPRAZOLAM 0.25 MG: 0.25 TABLET ORAL at 21:10

## 2021-07-02 RX ADMIN — SEVELAMER HYDROCHLORIDE 800 MG: 800 TABLET, FILM COATED PARENTERAL at 12:46

## 2021-07-02 RX ADMIN — INSULIN LISPRO 2 UNITS: 100 INJECTION, SOLUTION INTRAVENOUS; SUBCUTANEOUS at 17:58

## 2021-07-02 RX ADMIN — INSULIN LISPRO 2 UNITS: 100 INJECTION, SOLUTION INTRAVENOUS; SUBCUTANEOUS at 12:47

## 2021-07-02 NOTE — ASSESSMENT & PLAN NOTE
· INR subtherapeutic  · Continue heparin drip bridge until INR therapeutic  · Continue Coumadin, increase to 12 mg daily

## 2021-07-02 NOTE — ASSESSMENT & PLAN NOTE
Lab Results   Component Value Date    HGBA1C 8 4 (H) 06/11/2021       Recent Labs     07/01/21  1636 07/01/21  2055 07/02/21  0707 07/02/21  1201   POCGLU 284* 83 104 157*       Blood Sugar Average: Last 72 hrs:  (P) 144 [id number]   Cont home insulin regimen  Ct SSI

## 2021-07-02 NOTE — PROGRESS NOTES
NEPHROLOGY PROGRESS NOTE   Victor Manuel Casanova 49 y o  female MRN: [id number]  Unit/Bed#: [facility] 920-01 Encounter: [id number]  Reason for Consult: ESRD      SUMMARY:    61-year-old female with a history of end-stage renal disease on chronic incenter hemodialysis who was recently discharged from [facility] on Saturday aware she also underwent her dialysis only to be readmitted 24 hours later for bacterial pneumonia   She was recently discharged for left lung pneumonia         ASSESSMENT and PLAN:     End Stage Renal Disease  -Modality:In-Center Hemodialysis  -Schedule: Tuesday/Thursday/Saturday  -Dialysis Unit: [address]  -Access: Left Arm AV Fistula  -Presciption:  240 minutes, dry weight 124 5 kg, blood flow rate 400 cc/minute, dialysis flow rate 600 cc/minute, 2 potassium, 3 calcium, 35 bicarbonate, 138 sodium  -Status:   underwent hemodialysis yesterday, reported feeling sick to stomach after antibiotics  -Plan:   · Vancomycin after HD  · Plan for next dialysis on Saturday     Mineral bone disorder-chronic kidney disease  --renal diet  --Hectorol 2 mcg with dialysis  --Sensipar 30 mg daily  --Nephrocaps 1 capsule daily  --sevelamer 3 tablets p o  t i d   With meals and 1 with snacks     Bilateral bacterial pneumonia  --management as per the internal medicine team  --recurrent  --respiratory panel was negative for adenovirus, Bordetella, Chlamydia pneumonia, coronavirus (not COVID-19),   Influenza, respiratory syncytial virus, influenza a/B, mycoplasma, rhino/enterovirus  --sputum culture grow doubt mixed respiratory adonay  --blood cultures are negative  --SARS-CoV-2 negative  --dose antibiotics for dialysis (Cefepime daily + Vanco on HD)  --dose vancomycin based on level, pharmacy assistance      Hypertension  --blood pressure at target  --does not examine volume overloaded     Anemia of chronic kidney disease  --hemoglobin below target  --not on Epogen due to history of pulmonary embolism  --currently on heparin drip         SUBJECTIVE / INTERVAL HISTORY:    Sick to stomach yesterday after antibiotics were given in HD    OBJECTIVE:  Current Weight: Weight - Scale: 125 kg (275 lb)  Vitals:    07/01/21 1727 07/01/21 1730 07/01/21 2217 07/02/21 0710   BP: (!) 102/33 (!) 102/33 133/53 144/70   BP Location:       Pulse:   80 68   Resp: 19 19 20 18   Temp:  97 9 °F (36 6 °C) 97 7 °F (36 5 °C) 97 8 °F (36 6 °C)   TempSrc:       SpO2:   90% 98%   Weight:       Height:           Intake/Output Summary (Last 24 hours) at 7/2/2021 0849  Last data filed at 7/2/2021 0235  Gross per 24 hour   Intake 760 ml   Output 4500 ml   Net -3740 ml       Review of Systems:    12 point ROS has been reviewed  Physical Exam  Vitals and nursing note reviewed  Exam conducted with a chaperone present  Constitutional:       General: She is not in acute distress  Appearance: She is well-developed  HENT:      Head: Normocephalic and atraumatic  Eyes:      General: No scleral icterus  Conjunctiva/sclera: Conjunctivae normal       Pupils: Pupils are equal, round, and reactive to light  Cardiovascular:      Rate and Rhythm: Normal rate and regular rhythm  Heart sounds: S1 normal and S2 normal  No murmur heard  No friction rub  No gallop  Pulmonary:      Effort: Pulmonary effort is normal  No respiratory distress  Breath sounds: Normal breath sounds  No wheezing or rales  Abdominal:      General: Bowel sounds are normal       Palpations: Abdomen is soft  Tenderness: There is no abdominal tenderness  There is no rebound  Musculoskeletal:         General: Normal range of motion  Cervical back: Normal range of motion and neck supple  Skin:     Findings: No rash  Neurological:      Mental Status: She is alert and oriented to person, place, and time     Psychiatric:         Behavior: Behavior normal          Medications:    Current Facility-Administered Medications:     acetaminophen (TYLENOL) tablet 650 mg, 650 mg, Oral, Q6H PRN, Aliya Gehrig, DO, 650 mg at 07/02/21 0551    ALPRAZolam Eleonora Tylor) tablet 0 25 mg, 0 25 mg, Oral, BID PRN, Aliya Gehrig, DO, 0 25 mg at 07/01/21 2046    aluminum-magnesium hydroxide-simethicone (MYLANTA) oral suspension 30 mL, 30 mL, Oral, Q6H PRN, Aliya Gehrig, DO, 30 mL at 06/30/21 1603    atorvastatin (LIPITOR) tablet 20 mg, 20 mg, Oral, QPM, Aliya Gehrig, DO, 20 mg at 07/01/21 1738    atropine (ISOPTO ATROPINE) 1 % ophthalmic solution 1 drop, 1 drop, Both Eyes, HS, Aliya Gehrig, DO, 1 drop at 07/01/21 2327    brimonidine (ALPHAGAN P) 0 15 % ophthalmic solution 1 drop, 1 drop, Both Eyes, BID, Aliya Gehrig, DO, 1 drop at 07/01/21 1736    Calcium Carbonate Antacid oral suspension 1,250 mg, 1,250 mg, Oral, Daily, Aliya Gehrig, DO, 1,250 mg at 07/01/21 1349    carvedilol (COREG) tablet 25 mg, 25 mg, Oral, BID With Meals, Aliya Gehrig, DO, 25 mg at 06/30/21 1604    cefepime (MAXIPIME) 1,000 mg in dextrose 5 % 50 mL IVPB, 1,000 mg, Intravenous, Daily, Aliya Jatinig, DO, Last Rate: 100 mL/hr at 07/01/21 2029, 1,000 mg at 07/01/21 2029    cinacalcet (SENSIPAR) tablet 30 mg, 30 mg, Oral, Daily, Aliya Geayeshaig, DO, 30 mg at 07/01/21 1346    dextromethorphan-guaiFENesin (ROBITUSSIN DM) oral syrup 10 mL, 10 mL, Oral, Q4H PRN, Aliya Gehrig, DO, 10 mL at 06/27/21 2212    Diclofenac Sodium (VOLTAREN) 1 % topical gel 2 g, 2 g, Topical, 4x Daily, Mercy Cotta, DO, 2 g at 07/01/21 1739    doxercalciferol (HECTOROL) injection 2 mcg, 2 mcg, Intravenous, After Dialysis, Gary Cisneros MD, 2 mcg at 07/01/21 0947    gabapentin (NEURONTIN) capsule 300 mg, 300 mg, Oral, Daily, Aliya Gonzales DO, 300 mg at 07/01/21 1347    guaiFENesin (MUCINEX) 12 hr tablet 600 mg, 600 mg, Oral, Q12H, Aliya Gonzales DO, 600 mg at 07/01/21 1347    heparin (porcine) 25,000 units in 0 45% NaCl 250 mL infusion (premix), 3-30 Units/kg/hr (Order-Specific), Intravenous, Titrated, Isaías Yousif MD, Last Rate: 11 3 mL/hr at 07/02/21 0143, 9 Units/kg/hr at 07/02/21 0143    heparin (porcine) injection 10,000 Units, 10,000 Units, Intravenous, Q1H PRN, Isaías Yousif MD    heparin (porcine) injection 5,000 Units, 5,000 Units, Intravenous, Q1H PRN, Isaías Yousif MD, 5,000 Units at 07/02/21 0141    insulin glargine (LANTUS) subcutaneous injection 20 Units 0 2 mL, 20 Units, Subcutaneous, HS, Jonathan Acevedo DO, 20 Units at 07/01/21 2335    insulin lispro (HumaLOG) 100 units/mL subcutaneous injection 1-5 Units, 1-5 Units, Subcutaneous, HS, Jonathan Acevedo DO, 2 Units at 06/29/21 2135    insulin lispro (HumaLOG) 100 units/mL subcutaneous injection 2-12 Units, 2-12 Units, Subcutaneous, TID AC, 6 Units at 07/01/21 1735 **AND** Fingerstick Glucose (POCT), , , TID AC, Jonathan Acevedo DO    levothyroxine tablet 50 mcg, 50 mcg, Oral, Early Morning, Jonathan Acevedo DO, 50 mcg at 07/02/21 0551    lidocaine (LIDODERM) 5 % patch 1 patch, 1 patch, Topical, Daily, Jonathan Acevedo DO, 1 patch at 06/30/21 0900    loratadine (CLARITIN) tablet 10 mg, 10 mg, Oral, Daily, Jonathan Acevedo DO, 10 mg at 07/01/21 1348    metoclopramide (REGLAN) injection 5 mg, 5 mg, Intravenous, Q6H PRN, Asuncion Huggins PA-C, 5 mg at 07/01/21 2027    metoprolol (LOPRESSOR) injection 2 5 mg, 2 5 mg, Intravenous, Q6H PRN, Alisa Lyman DO    NIFEdipine (PROCARDIA XL) 24 hr tablet 30 mg, 30 mg, Oral, Daily, Jonathan Acevedo DO, 30 mg at 07/01/21 1347    ondansetron (ZOFRAN) injection 4 mg, 4 mg, Intravenous, Q6H PRN, Jonathan Acevedo DO, 4 mg at 07/01/21 1744    ondansetron (ZOFRAN-ODT) dispersible tablet 4 mg, 4 mg, Oral, Q6H PRN, Alisa Lyman DO    pantoprazole (PROTONIX) EC tablet 40 mg, 40 mg, Oral, Early Morning, Jonathan Acevedo DO, 40 mg at 07/02/21 0551    prednisoLONE acetate (PRED FORTE) 1 % ophthalmic suspension 1 drop, 1 drop, Both Eyes, 4x Daily, Killen José Miguel Ocie Honey, DO, 1 drop at 07/01/21 2327    senna (SENOKOT) tablet 17 2 mg, 2 tablet, Oral, HS PRN, Javier Holliday , 17 2 mg at 06/27/21 2213    sertraline (ZOLOFT) tablet 50 mg, 50 mg, Oral, Daily, Javier MgDO, 50 mg at 07/01/21 1348    sevelamer (RENAGEL) tablet 800 mg, 800 mg, Oral, TID With Meals, Javier Holliday DO, 800 mg at 07/01/21 1736    sodium chloride (OCEAN) 0 65 % nasal spray 1 spray, 1 spray, Each Nare, TID PRN, Javier Holliday DO    torsemide [facility]) tablet 100 mg, 100 mg, Oral, Q12H, Javier MgDO, 100 mg at 07/01/21 2327    traMADol (ULTRAM) tablet 50 mg, 50 mg, Oral, BID, Glenora Mass, DO, 50 mg at 07/01/21 1347    vancomycin (VANCOCIN) IVPB (premix in dextrose) 750 mg 150 mL, 10 mg/kg (Adjusted), Intravenous, After Dialysis, Javier Holliday DO, Stopped at 07/01/21 1141    [START ON 7/4/2021] warfarin (COUMADIN) tablet 12 mg, 12 mg, Oral, Weekly, Alexandria Funez MD    warfarin (COUMADIN) tablet 9 mg, 9 mg, Oral, Once per day on Mon Tue Wed Thu Fri Sat, Alexandria Funez MD, 9 mg at 07/01/21 1737    Laboratory Results:  Results from last 7 days   Lab Units 07/01/21  0645 06/29/21  0616 06/28/21  0513 06/27/21  1559   WBC Thousand/uL 7 96 8 54 7 75 8 96   HEMOGLOBIN g/dL 8 1* 8 7* 8 6* 9 3*   HEMATOCRIT % 25 1* 26 4* 26 9* 27 6*   PLATELETS Thousands/uL 156 178 191 228   POTASSIUM mmol/L  --   --  4 8 5 1   CHLORIDE mmol/L  --   --  102 100   CO2 mmol/L  --   --  27 26   BUN mg/dL  --   --  57* 45*   CREATININE mg/dL  --   --  7 57* 6 80*   CALCIUM mg/dL  --   --  8 7 8 4   MAGNESIUM mg/dL  --   --  2 2  --

## 2021-07-02 NOTE — ASSESSMENT & PLAN NOTE
Recent admission from 6/23/21 to 6/26/21 with left upper lobe pneumonia - received Vanco and doxycycline x 1 day, Cefepime x 4 days, then discharged on cefedinir (6/26) to complete 3 more days for a total of 7 days  Presented on 6/27 with shortness of breath and temp of 101 at home  Repeat CT chest - "New right lower lobe opacity /pneumonia   Minimally improved left upper lobe consolidation /pneumonia "  No fever or leucocytosis noted after arrival  Procal - negative  Hx of MRSA 6/8  Seen by pulm - rule out  viral versus possible organizing pneumonia,    Sputum culture with mixed respiratory adonay no significant growth of MRSA or Pseudomonas  On IV cefepime and vancomycin per pulmonology  Per pulmonology patient declined bronchoscopy  Clinically improving on IV antibiotic  Home tomorrow

## 2021-07-02 NOTE — PROGRESS NOTES
Progress Note - Pulmonary   Dary Sidhu 49 y o  female MRN: [id number]  Unit/Bed#: [facility] 920-01 Encounter: [id number]    Assessment:  1  HAP POA, likely viral, less likely organizing pneumonia  2  ESRD on HD  3  IDDM  4  DVT on coumadin, now on heparin gtt  5  Morbid obesity  Plan:  Patient continuing to report improvement from sputum production perspective  Discontinue antibiotics, has gotten 8 days between two hospitalizations  She has declined bronchoscopy  Patient will reportedly go home after HD tomorrow  Will sign off, please call with any questions  Chief Complaint:   None  Subjective:   No new complaints  Breathing feels ok today, sputum production is decreased and more clear but still present  Objective:     Vitals: Blood pressure 144/70, pulse 68, temperature 97 8 °F (36 6 °C), resp  rate 18, height 5' 6" (1 676 m), weight 125 kg (275 lb), last menstrual period 02/12/2016, SpO2 98 %, not currently breastfeeding  ,Body mass index is 44 39 kg/m²  Intake/Output Summary (Last 24 hours) at 7/2/2021 0952  Last data filed at 7/2/2021 0235  Gross per 24 hour   Intake 520 ml   Output 4500 ml   Net -3980 ml     Invasive Devices     Central Venous Catheter Line            CVC Central Lines 06/29/21 Double Right Internal jugular 2 days          Line            Hemodialysis AV Fistula 02/20/18 Left Forearm 1227 days              Physical Exam:   General:  No acute distress  Alert and oriented x 3  Morbidly obese  HEENT:  PERRL   MMM  Chest:  Clear bilaterally  Cardiovascular:  S1 + S2, RRR, no M/R/G  Abdomen:  Soft, nontender, nondistended, no palpable masses or organomegaly  Extremities:  No significant edema  Neuro:  No focal deficits, grossly intact  Labs: I have personally reviewed pertinent lab results    Imaging and other studies: I have personally reviewed pertinent films in PACS

## 2021-07-02 NOTE — PROGRESS NOTES
[address] [facility]  Progress Note Zhen Snyder 1965, 49 y o  female MRN: [id number]  [facility] En[facility]: [id number]  Primary Care Provider: Vladimir Ramirez MD   Date and time admitted to hospital: 6/27/2021  3:20 PM    * Pneumonia  Assessment & Plan  Recent admission from 6/23/21 to 6/26/21 with left upper lobe pneumonia - received Vanco and doxycycline x 1 day, Cefepime x 4 days, then discharged on cefedinir (6/26) to complete 3 more days for a total of 7 days  Presented on 6/27 with shortness of breath and temp of 101 at home  Repeat CT chest - "New right lower lobe opacity /pneumonia   Minimally improved left upper lobe consolidation /pneumonia "  No fever or leucocytosis noted after arrival  Procal - negative  Hx of MRSA 6/8  Seen by pulm - rule out  viral versus possible organizing pneumonia,    Sputum culture with mixed respiratory adonay no significant growth of MRSA or Pseudomonas  On IV cefepime and vancomycin per pulmonology  Per pulmonology patient declined bronchoscopy  Clinically improving on IV antibiotic  Home tomorrow      Hypertension  Assessment & Plan  Continue home regimen  Monitor BP - acceptable at present  Nifedipine had been added to regimen during recent admission    Hypothyroidism  Assessment & Plan  TSH 0 736 on 06/24  Cont levothyroxine 50 mcg daily    Morbid obesity ([facility] [address] )  Assessment & Plan  Lifestyle/dietary modification    Type 2 diabetes mellitus ([facility] [address] )  Assessment & Plan  Lab Results   Component Value Date    HGBA1C 8 4 (H) 06/11/2021       Recent Labs     07/01/21  1636 07/01/21  2055 07/02/21  0707 07/02/21  1201   POCGLU 284* 83 104 157*       Blood Sugar Average: Last 72 hrs:  (P) 144 [id number]   Cont home insulin regimen  Ct SSI      ESRD on hemodialysis [facility])  Assessment & Plan  Lab Results   Component Value Date    EGFR 6 06/28/2021    EGFR 6 06/27/2021    EGFR 5 06/24/2021    CREATININE 7 57 (H) 06/28/2021    CREATININE 6 80 (H) 2021    CREATININE 8 87 (H) 2021   On HD T/Th/Sat  Nephrology following    History of DVT (deep vein thrombosis)/PE  Assessment & Plan  · INR subtherapeutic  · Continue heparin drip bridge until INR therapeutic  · Continue Coumadin, increase to 12 mg daily      VTE Pharmacologic Prophylaxis: VTE Score: 7 heparin drip    Patient Centered Rounds: I performed bedside rounds with nursing staff today  Discussions with Specialists or Other Care Team Provider:     Education and Discussions with Family / Patient:  Patient    Time Spent for Care: 45 minutes  More than 50% of total time spent on counseling and coordination of care as described above  Current Length of Stay: 5 day(s)  Current Patient Status: Inpatient   Certification Statement: The patient will continue to require additional inpatient hospital stay due to Subtherapeutic INR  Discharge Plan: Anticipate discharge tomorrow to home  Code Status: Level 1 - Full Code    Subjective:   Patient seen and examined  Clinically improving  No event overnight    Objective:     Vitals:   Temp (24hrs), Av 8 °F (36 6 °C), Min:97 7 °F (36 5 °C), Max:97 9 °F (36 6 °C)    Temp:  [97 7 °F (36 5 °C)-97 9 °F (36 6 °C)] 97 8 °F (36 6 °C)  HR:  [68-80] 68  Resp:  [18-20] 18  BP: (102-144)/(33-70) 144/70  SpO2:  [90 %-98 %] 98 %  Body mass index is 44 39 kg/m²  Input and Output Summary (last 24 hours):      Intake/Output Summary (Last 24 hours) at 2021 1224  Last data filed at 2021 0235  Gross per 24 hour   Intake 220 ml   Output 0 ml   Net 220 ml       Physical Exam:   Physical Exam   Patient is awake alert oriented no acute distress  Lung with decreased breath sounds bilateral  Heart positive S1-S2 no murmur  Abdomen soft nontender  Lower extremities no edema    Additional Data:     Labs:  Results from last 7 days   Lab Units 21  0645 21  1559   WBC Thousand/uL 7 96 8 96   HEMOGLOBIN g/dL 8 1* 9 3*   HEMATOCRIT % 25 1* 27 6* PLATELETS Thousands/uL 156 228   NEUTROS PCT %  --  74   LYMPHS PCT %  --  16   MONOS PCT %  --  7   EOS PCT %  --  1     Results from last 7 days   Lab Units 06/28/21  0513 06/27/21  1559   SODIUM mmol/L 137 135*   POTASSIUM mmol/L 4 8 5 1   CHLORIDE mmol/L 102 100   CO2 mmol/L 27 26   BUN mg/dL 57* 45*   CREATININE mg/dL 7 57* 6 80*   ANION GAP mmol/L 8 9   CALCIUM mg/dL 8 7 8 4   ALBUMIN g/dL  --  2 9*   TOTAL BILIRUBIN mg/dL  --  0 49   ALK PHOS U/L  --  198*   ALT U/L  --  23   AST U/L  --  13   GLUCOSE RANDOM mg/dL 175* 293*     Results from last 7 days   Lab Units 07/02/21  0558   INR  1 37*     Results from last 7 days   Lab Units 07/02/21  1201 07/02/21  0707 07/01/21  2055 07/01/21  1636 07/01/21  1258 07/01/21  0717 07/01/21  0626 06/30/21  2100 06/30/21  1648 06/30/21  1059 06/30/21  0758 06/29/21  2037   POC GLUCOSE mg/dl 157* 104 83 284* 128 136 114 144* 161* 159* 101 230*         Results from last 7 days   Lab Units 06/29/21  0616 06/28/21  1230   PROCALCITONIN ng/ml 0 14 0 17       Lines/Drains:  Invasive Devices     Central Venous Catheter Line            CVC Central Lines 06/29/21 Double Right Internal jugular 2 days          Line            Hemodialysis AV Fistula 02/20/18 Left Forearm 1227 days                Central Line:  Goal for removal:  Chronic             Imaging:  Reviewed    Recent Cultures (last 7 days):   Results from last 7 days   Lab Units 06/28/21  0826 06/27/21  1910   BLOOD CULTURE   --  No Growth After 4 Days     SPUTUM CULTURE  3+ Growth of   --    GRAM STAIN RESULT  2+ Epithelial cells per low power field*  1+ Polys*  1+ Gram positive rods*  1+ Gram negative rods*  1+ Gram positive cocci in pairs*  --        Last 24 Hours Medication List:   Current Facility-Administered Medications   Medication Dose Route Frequency Provider Last Rate    acetaminophen  650 mg Oral Q6H PRN Awa Roxi, DO      ALPRAZolam  0 25 mg Oral BID PRN Awa Russian, DO      aluminum-magnesium hydroxide-simethicone  30 mL Oral Q6H PRN Launie Rho, DO      atorvastatin  20 mg Oral QPM Launie Rho, DO      atropine  1 drop Both Eyes HS Launie Rho, DO      brimonidine  1 drop Both Eyes BID Launie Rho, DO      Calcium Carbonate Antacid  1,250 mg Oral Daily Launie Rho, DO      carvedilol  25 mg Oral BID With Meals Launie Rho, DO      cinacalcet  30 mg Oral Daily Launie Rho, DO      dextromethorphan-guaiFENesin  10 mL Oral Q4H PRN Launie Rho, DO      Diclofenac Sodium  2 g Topical 4x Daily Lethea Rife, DO      doxercalciferol  2 mcg Intravenous After Dialysis Ledy Hernandez MD      gabapentin  300 mg Oral Daily Launie Rho, DO      guaiFENesin  600 mg Oral Q12H Launie Rho, DO      heparin (porcine)  3-30 Units/kg/hr (Order-Specific) Intravenous Titrated Jen Dupree MD 6 Units/kg/hr (07/02/21 1030)    heparin (porcine)  10,000 Units Intravenous Q1H PRN Jen Dupree MD      heparin (porcine)  5,000 Units Intravenous Q1H PRN Jen Dupree MD      insulin glargine  20 Units Subcutaneous HS Launie Rho, DO      insulin lispro  1-5 Units Subcutaneous HS Launie Rho, DO      insulin lispro  2-12 Units Subcutaneous TID AC Launie Rho, DO      levothyroxine  50 mcg Oral Early Morning Launie Rho, DO      lidocaine  1 patch Topical Daily Launie Rho, DO      loratadine  10 mg Oral Daily Launie Rho, DO      metoclopramide  5 mg Intravenous Q6H PRN LILLIANA Mccain      metoprolol  2 5 mg Intravenous Q6H PRN Lethea Rife, DO      NIFEdipine ER  30 mg Oral Daily Launie Rho, DO      ondansetron  4 mg Intravenous Q6H PRN Launie Rho, DO      ondansetron  4 mg Oral Q6H PRN Lethea Rife, DO      pantoprazole  40 mg Oral Early Morning Launie Rho, DO      prednisoLONE acetate  1 drop Both Eyes 4x Daily Launie Rho, DO      senna  2 tablet Oral HS PRN Marlinda Doles, DO      sertraline  50 mg Oral Daily Marlinda Doles, DO      sevelamer  800 mg Oral TID With Meals Marlinda Doles, DO      sodium chloride  1 spray Each Nare TID PRN Marlinda Doles, DO      torsemide  100 mg Oral Q12H Marlinda Doles, DO      traMADol  50 mg Oral BID Elizabeth Enciso,       warfarin  12 mg Oral Daily (warfarin) Elizabeth Enciso DO          Today, Patient Was Seen By: Elizabeth Enciso DO    **Please Note: This note may have been constructed using a voice recognition system  **

## 2021-07-03 ENCOUNTER — APPOINTMENT (INPATIENT)
Dept: DIALYSIS | Facility: HOSPITAL | Age: 56
DRG: 177 | End: 2021-07-03
Attending: INTERNAL MEDICINE
Payer: MEDICARE

## 2021-07-03 VITALS
SYSTOLIC BLOOD PRESSURE: 148 MMHG | DIASTOLIC BLOOD PRESSURE: 83 MMHG | WEIGHT: 275 LBS | HEIGHT: 66 IN | TEMPERATURE: 98.1 F | HEART RATE: 83 BPM | OXYGEN SATURATION: 98 % | RESPIRATION RATE: 16 BRPM | BODY MASS INDEX: 44.2 KG/M2

## 2021-07-03 LAB
APTT PPP: 50 SECONDS (ref 23–37)
APTT PPP: 96 SECONDS (ref 23–37)
ERYTHROCYTE [DISTWIDTH] IN BLOOD BY AUTOMATED COUNT: 13.4 % (ref 11.6–15.1)
GLUCOSE SERPL-MCNC: 122 MG/DL (ref 65–140)
HCT VFR BLD AUTO: 25.7 % (ref 34.8–46.1)
HGB BLD-MCNC: 8.2 G/DL (ref 11.5–15.4)
INR PPP: 1.36 (ref 0.84–1.19)
MCH RBC QN AUTO: 30.5 PG (ref 26.8–34.3)
MCHC RBC AUTO-ENTMCNC: 31.9 G/DL (ref 31.4–37.4)
MCV RBC AUTO: 96 FL (ref 82–98)
PLATELET # BLD AUTO: 150 THOUSANDS/UL (ref 149–390)
PMV BLD AUTO: 11.1 FL (ref 8.9–12.7)
PROTHROMBIN TIME: 16.7 SECONDS (ref 11.6–14.5)
RBC # BLD AUTO: 2.69 MILLION/UL (ref 3.81–5.12)
WBC # BLD AUTO: 7.68 THOUSAND/UL (ref 4.31–10.16)

## 2021-07-03 PROCEDURE — 85610 PROTHROMBIN TIME: CPT | Performed by: INTERNAL MEDICINE

## 2021-07-03 PROCEDURE — 82948 REAGENT STRIP/BLOOD GLUCOSE: CPT

## 2021-07-03 PROCEDURE — 85730 THROMBOPLASTIN TIME PARTIAL: CPT | Performed by: INTERNAL MEDICINE

## 2021-07-03 PROCEDURE — 90935 HEMODIALYSIS ONE EVALUATION: CPT | Performed by: INTERNAL MEDICINE

## 2021-07-03 PROCEDURE — 85027 COMPLETE CBC AUTOMATED: CPT | Performed by: INTERNAL MEDICINE

## 2021-07-03 PROCEDURE — 99239 HOSP IP/OBS DSCHRG MGMT >30: CPT | Performed by: INTERNAL MEDICINE

## 2021-07-03 RX ORDER — GUAIFENESIN 600 MG
600 TABLET, EXTENDED RELEASE 12 HR ORAL 2 TIMES DAILY
Qty: 10 TABLET | Refills: 0 | Status: SHIPPED | OUTPATIENT
Start: 2021-07-03 | End: 2021-07-08

## 2021-07-03 RX ORDER — ALBUTEROL SULFATE 90 UG/1
2 AEROSOL, METERED RESPIRATORY (INHALATION) EVERY 6 HOURS PRN
Qty: 8.5 G | Refills: 0 | Status: SHIPPED | OUTPATIENT
Start: 2021-07-03

## 2021-07-03 RX ADMIN — CALCIUM CARBONATE 1250 MG: 1250 SUSPENSION ORAL at 12:35

## 2021-07-03 RX ADMIN — TRAMADOL HYDROCHLORIDE 50 MG: 50 TABLET, FILM COATED ORAL at 12:28

## 2021-07-03 RX ADMIN — TORSEMIDE 100 MG: 20 TABLET ORAL at 12:32

## 2021-07-03 RX ADMIN — ONDANSETRON 4 MG: 2 INJECTION INTRAMUSCULAR; INTRAVENOUS at 12:52

## 2021-07-03 RX ADMIN — GUAIFENESIN 600 MG: 600 TABLET, EXTENDED RELEASE ORAL at 12:31

## 2021-07-03 RX ADMIN — NIFEDIPINE 30 MG: 30 TABLET, FILM COATED, EXTENDED RELEASE ORAL at 12:34

## 2021-07-03 RX ADMIN — LEVOTHYROXINE SODIUM 50 MCG: 50 TABLET ORAL at 05:19

## 2021-07-03 RX ADMIN — ALPRAZOLAM 0.25 MG: 0.25 TABLET ORAL at 07:44

## 2021-07-03 RX ADMIN — SEVELAMER HYDROCHLORIDE 800 MG: 800 TABLET, FILM COATED PARENTERAL at 12:28

## 2021-07-03 RX ADMIN — SERTRALINE HYDROCHLORIDE 50 MG: 50 TABLET ORAL at 12:32

## 2021-07-03 RX ADMIN — GABAPENTIN 300 MG: 300 CAPSULE ORAL at 12:31

## 2021-07-03 RX ADMIN — PANTOPRAZOLE SODIUM 40 MG: 40 TABLET, DELAYED RELEASE ORAL at 05:19

## 2021-07-03 RX ADMIN — LORATADINE 10 MG: 10 TABLET ORAL at 12:28

## 2021-07-03 RX ADMIN — DOXERCALCIFEROL 2 MCG: 4 INJECTION, SOLUTION INTRAVENOUS at 08:48

## 2021-07-03 RX ADMIN — CINACALCET 30 MG: 30 TABLET, FILM COATED ORAL at 12:35

## 2021-07-03 NOTE — ASSESSMENT & PLAN NOTE
Lab Results   Component Value Date    HGBA1C 8 4 (H) 06/11/2021       Recent Labs     07/02/21  0707 07/02/21  1201 07/02/21  1535 07/02/21  2110   POCGLU 104 157* 161* 239*       Blood Sugar Average: Last 72 hrs:  (P) 507 [id number]   Cont home insulin regimen  Ct SSI

## 2021-07-03 NOTE — PROCEDURES
Procedure Note - Nephrology   [facility] 49 y o  female MRN: [id number]  [facility]counter: [id number]      Assessment / Plan:  1  ESRD on HD TTS at [address]-patient seen examined by me on hemodialysis today  -target weight 124 5 kg    2  Access-left arm AVF fistula well-functioning    3  Secondary hyperparathyroidism of renal origin -continue Hectorol 2 mics with dialysis sessions, Sensipar 30 mg daily, Nephrocaps 1 tab daily, sevelamer 3 tablets p o  T i d  With meals and 1 with snacks for hyperphosphatemia, monitor PTH/phos outpatient, continue renal diet    4  Bilateral bacterial pneumonia-recurrent, COVID negative, no longer on antibiotics    5  Hypertension blood pressure at target on current regimen, UF as tolerated    6  Anemia of CKD-hemoglobin below target, not on Epogen due to history of PE      Subjective:   She denies chest pain or shortness of breath  Patient seen and examined by me on hemodialysis approximately 10:42 a m     Blood flow 400 mL/min, dialysate flow 600 mL/min, UF goal 4 L, blood pressure 123/103  Objective:     Vitals: Blood pressure 148/83, pulse 83, temperature 98 1 °F (36 7 °C), temperature source Oral, resp  rate 16, height 5' 6" (1 676 m), weight 125 kg (275 lb), last menstrual period 2016, SpO2 98 %, not currently breastfeeding  ,Body mass index is 44 39 kg/m²  Temp (24hrs), Av 1 °F (36 7 °C), Min:98 1 °F (36 7 °C), Max:98 1 °F (36 7 °C)      Weight (last 2 days)     None            Intake/Output Summary (Last 24 hours) at 7/3/2021 1357  Last data filed at 7/3/2021 1144  Gross per 24 hour   Intake 1531 34 ml   Output 3934 ml   Net -2402 66 ml     I/O last 24 hours: In: 1531 3 [P O :240; I V :791 3; Other:500]  Out: 3934 [Other:3934]        Physical Exam:   Physical Exam  Vitals and nursing note reviewed  Constitutional:       General: She is not in acute distress  Appearance: Normal appearance  She is well-developed  She is obese  She is not diaphoretic  HENT:      Head: Normocephalic and atraumatic  Nose: Nose normal       Mouth/Throat:      Mouth: Mucous membranes are moist       Pharynx: No oropharyngeal exudate  Eyes:      General: No scleral icterus  Right eye: No discharge  Left eye: No discharge  Comments: eyeglasses   Neck:      Thyroid: No thyromegaly  Cardiovascular:      Rate and Rhythm: Normal rate and regular rhythm  Heart sounds: No murmur heard  Pulmonary:      Effort: Pulmonary effort is normal  No respiratory distress  Breath sounds: Normal breath sounds  No wheezing  Abdominal:      General: Bowel sounds are normal  There is no distension  Palpations: Abdomen is soft  Musculoskeletal:         General: No swelling  Cervical back: Normal range of motion and neck supple  Skin:     General: Skin is warm and dry  Findings: No rash  Neurological:      General: No focal deficit present  Mental Status: She is alert  Motor: No abnormal muscle tone        Comments: awake   Psychiatric:         Mood and Affect: Mood normal          Behavior: Behavior normal          Invasive Devices     Line            Hemodialysis AV Fistula 02/20/18 Left Forearm 1228 days                Medications:    Scheduled Meds:  Current Facility-Administered Medications   Medication Dose Route Frequency Provider Last Rate    acetaminophen  650 mg Oral Q6H PRN [facility], DO      ALPRAZolam  0 25 mg Oral BID PRN [facility], DO      aluminum-magnesium hydroxide-simethicone  30 mL Oral Q6H PRN [facility], DO      atorvastatin  20 mg Oral QPM [facility], DO      atropine  1 drop Both Eyes HS [facility], DO      brimonidine  1 drop Both Eyes BID [facility], DO      Calcium Carbonate Antacid  1,250 mg Oral Daily [facility], DO      carvedilol  25 mg Oral BID With Meals [facility], DO      cinacalcet  30 mg Oral Daily [facility], DO      dextromethorphan-guaiFENesin  10 mL Oral Q4H PRN Mikki Field, DO      Diclofenac Sodium  2 g Topical 4x Daily Edwige Norman, DO      doxercalciferol  2 mcg Intravenous After Dialysis Nasir Avila MD      gabapentin  300 mg Oral Daily Mikki Field, DO      guaiFENesin  600 mg Oral Q12H Mikki Field, DO      insulin glargine  20 Units Subcutaneous HS Mikki Field, DO      insulin lispro  1-5 Units Subcutaneous HS Mikki Field, DO      insulin lispro  2-12 Units Subcutaneous TID AC Mikki Field, DO      levothyroxine  50 mcg Oral Early Morning Mikki Field, DO      lidocaine  1 patch Topical Daily Mikki Field, DO      loratadine  10 mg Oral Daily Mikki Field, DO      metoclopramide  5 mg Intravenous Q6H PRN LILLIANA Mccain      metoprolol  2 5 mg Intravenous Q6H PRN Edwige Norman, DO      NIFEdipine ER  30 mg Oral Daily Mikki Field, DO      ondansetron  4 mg Intravenous Q6H PRN Mikki Field, DO      ondansetron  4 mg Oral Q6H PRN Edwige Norman, DO      pantoprazole  40 mg Oral Early Morning Mikki Field, DO      prednisoLONE acetate  1 drop Both Eyes 4x Daily Mikki Field, DO      senna  2 tablet Oral HS PRN Mikki Field, DO      sertraline  50 mg Oral Daily Mikki Field, DO      sevelamer  800 mg Oral TID With Meals Mikki Field, DO      sodium chloride  1 spray Each Nare TID PRN Mikki Field, DO      torsemide  100 mg Oral Q12H Mikki Field, DO      traMADol  50 mg Oral BID Edwige Norman, DO      warfarin  12 mg Oral Daily (warfarin) Edwige Norman, DO         PRN Meds:   acetaminophen    ALPRAZolam    aluminum-magnesium hydroxide-simethicone    dextromethorphan-guaiFENesin    doxercalciferol    metoclopramide    metoprolol    ondansetron    ondansetron    senna    sodium chloride    Continuous Infusions:         LAB RESULTS:      Results from last 7 days   Lab Units 07/03/21  0602 07/01/21  0653 06/29/21  0616 06/28/21  0513 06/27/21  1559   WBC Thousand/uL 7 68 7 96 8 54 7 75 8 96   HEMOGLOBIN g/dL 8 2* 8 1* 8 7* 8 6* 9 3*   HEMATOCRIT % 25 7* 25 1* 26 4* 26 9* 27 6*   PLATELETS Thousands/uL 150 156 178 191 228   NEUTROS PCT %  --   --   --   --  74   LYMPHS PCT %  --   --   --   --  16   MONOS PCT %  --   --   --   --  7   EOS PCT %  --   --   --   --  1   POTASSIUM mmol/L  --   --   --  4 8 5 1   CHLORIDE mmol/L  --   --   --  102 100   CO2 mmol/L  --   --   --  27 26   BUN mg/dL  --   --   --  57* 45*   CREATININE mg/dL  --   --   --  7 57* 6 80*   CALCIUM mg/dL  --   --   --  8 7 8 4   ALK PHOS U/L  --   --   --   --  198*   ALT U/L  --   --   --   --  23   AST U/L  --   --   --   --  13   MAGNESIUM mg/dL  --   --   --  2 2  --        CUTURES:  Lab Results   Component Value Date    BLOODCX No Growth After 5 Days  06/27/2021    BLOODCX No Growth After 5 Days  08/26/2019    BLOODCX No Growth After 5 Days  08/26/2019    BLOODCX No Growth After 5 Days  04/26/2019    BLOODCX No Growth After 5 Days  04/26/2019    BLOODCX No Growth After 5 Days  08/08/2017    BLOODCX No Growth After 5 Days  08/08/2017    URINECX >100,000 cfu/ml Lactobacillus species 07/12/2016    URINECX >100,000 cfu/ml Mixed Contaminants X3 07/12/2016                 Portions of the record may have been created with voice recognition software  Occasional wrong word or "sound a like" substitutions may have occurred due to the inherent limitations of voice recognition software  Read the chart carefully and recognize, using context, where substitutions have occurred  If you have any questions, please contact the dictating provider

## 2021-07-03 NOTE — ASSESSMENT & PLAN NOTE
Recent admission from 6/23/21 to 6/26/21 with left upper lobe pneumonia - received Vanco and doxycycline x 1 day, Cefepime x 4 days, then discharged on cefedinir (6/26) to complete 3 more days for a total of 7 days  Presented on 6/27 with shortness of breath and temp of 101 at home  Repeat CT chest - "New right lower lobe opacity /pneumonia   Minimally improved left upper lobe consolidation /pneumonia "  No fever or leucocytosis noted after arrival  Procal - negative  Hx of MRSA 6/8  Seen by pulm - rule out  viral versus possible organizing pneumonia,    Sputum culture with mixed respiratory adonay no significant growth of MRSA or Pseudomonas  On IV cefepime and vancomycin per pulmonology  Per pulmonology patient declined bronchoscopy  Clinically improving on IV antibiotic, completed course  Clinically much improved  Discharge home today

## 2021-07-03 NOTE — DISCHARGE SUMMARY
[facility]  Discharge- [facility] 1965, 49 y o  female MRN: [id number]  Unit/Bed#: [facility] 920-01 Encounter: [id number]  Primary Care Provider: Maren Brumfield MD   Date and time admitted to hospital: 6/27/2021  3:20 PM    * Pneumonia  Assessment & Plan  Recent admission from 6/23/21 to 6/26/21 with left upper lobe pneumonia - received Vanco and doxycycline x 1 day, Cefepime x 4 days, then discharged on cefedinir (6/26) to complete 3 more days for a total of 7 days  Presented on 6/27 with shortness of breath and temp of 101 at home  Repeat CT chest - "New right lower lobe opacity /pneumonia   Minimally improved left upper lobe consolidation /pneumonia "  No fever or leucocytosis noted after arrival  Procal - negative  Hx of MRSA 6/8  Seen by pulm - rule out  viral versus possible organizing pneumonia,    Sputum culture with mixed respiratory adonay no significant growth of MRSA or Pseudomonas  On IV cefepime and vancomycin per pulmonology  Per pulmonology patient declined bronchoscopy  Clinically improving on IV antibiotic, completed course  Clinically much improved  Discharge home today      Hypertension  Assessment & Plan  Continue home regimen  Monitor BP - acceptable at present  Nifedipine had been added to regimen during recent admission    Hypothyroidism  Assessment & Plan  TSH 0 736 on 06/24  Cont levothyroxine 50 mcg daily    Morbid obesity ([address] )  Assessment & Plan  Lifestyle/dietary modification    Type 2 diabetes mellitus ([facility] [address] )  Assessment & Plan  Lab Results   Component Value Date    HGBA1C 8 4 (H) 06/11/2021       Recent Labs     07/02/21  0707 07/02/21  1201 07/02/21  1535 07/02/21  2110   POCGLU 104 157* 161* 239*       Blood Sugar Average: Last 72 hrs:  (P) 151 [id number]   Cont home insulin regimen  Ct SSI      ESRD on hemodialysis [facility])  Assessment & Plan  Lab Results   Component Value Date    EGFR 6 06/28/2021    EGFR 6 06/27/2021    EGFR 5 06/24/2021 CREATININE 7 57 (H) 06/28/2021    CREATININE 6 80 (H) 06/27/2021    CREATININE 8 87 (H) 06/24/2021   On HD T/Th/Sat  Nephrology following    History of DVT (deep vein thrombosis)/PE  Assessment & Plan  · INR subtherapeutic  · Patient will be discharged home on Coumadin on subtherapeutic INR to follow with her PCP for further adjustment in her Coumadin dosage    Medical Problems     Resolved Problems  Date Reviewed: 7/3/2021    None              Discharging Physician / Practitioner: Lavinia Medina DO  PCP: Cecilia Lopez MD  Admission Date:   Admission Orders (From admission, onward)     Ordered        06/27/21 1830  INPATIENT ADMISSION  Once                   Discharge Date: 07/03/21    Consultations During Hospital Stay:  · Pulmonology  · Nephrology    Procedures Performed:   · Dual lumen central venous catheter placement and removal    Significant Findings / Test Results:   · As above    Incidental Findings:   · As a above    Test Results Pending at Discharge (will require follow up): · None     Outpatient Tests Requested:  · Monitor PT INR    Complications:  None    Reason for Admission:  Recurrent pneumonia, HAP    Hospital Course:   Jose Condon is a 49 y o  female patient who originally presented to the hospital on 6/27/2021 due to shortness of breath, generalized weakness and cough   Pt was recently admitted from 6/23-6/26 and treated for HCAP  She was discharged on cefdinir  Evaluated in the emergency room, chest x-ray with Persistent infiltrate in the left upper lobe  Minimal infiltrate or atelectasis at the right lung base  Chest CT scan  new right lower lobe opacity/pneumonia, Minimally improved left upper lobe consolidation /pneumonia      Patient was admitted the hospital, pulmonology consulted, she was treated with IV antibiotic and bronchodilator  Patient has no fever or, leukocytosis, procalcitonin was negative, rule out viral pneumonia vs organizing pneumonia, patient declined bronchoscopy  She was cleared by pulmonology to be discharged home today after she completed 7 days course of antibiotic in the hospital    Patient clinically much improved with less short of breath/ cough/ and pleuritic chest pain    She was instructed to follow with family doctor in 1 week  Patient persistently has subtherapeutic INR, recommendation for Coumadin dosage adjustment by her PCP  Patient will talk to her PCP regarding Coumadin dosage adjustment    Please see above list of diagnoses and related plan for additional information  Condition at Discharge: stable    Discharge Day Visit / Exam:   Subjective:    Patient seen and examined  Comfortable during dialysis today  No event overnight  Clinically improving    Vitals: Blood Pressure: 148/83 (07/03/21 1144)  Pulse: 83 (07/03/21 1144)  Temperature: 98 1 °F (36 7 °C) (07/03/21 1144)  Temp Source: Oral (07/03/21 1144)  Respirations: 16 (07/03/21 1144)  Height: 5' 6" (167 6 cm) (06/30/21 2227)  Weight - Scale: 125 kg (275 lb) (06/30/21 2227)  SpO2: 98 % (07/02/21 0710)  Exam:   Physical Exam   Patient is awake alert oriented no acute distress  Lung with decreased breath sounds bilateral  Heart positive S1-S2 no murmur  Abdomen soft nontender  Lower extremities no edema      Discharge instructions/Information to patient and family:   See after visit summary for information provided to patient and family  Provisions for Follow-Up Care:  See after visit summary for information related to follow-up care and any pertinent home health orders  Disposition:   Home with [facility] (Reminder: Complete face to face encounter)    Planned Readmission: no     Discharge Statement:  I spent 45  minutes discharging the patient  This time was spent on the day of discharge  I had direct contact with the patient on the day of discharge   Greater than 50% of the total time was spent examining patient, answering all patient questions, arranging and discussing plan of care with patient as well as directly providing post-discharge instructions  Additional time then spent on discharge activities  Discharge Medications:  See after visit summary for reconciled discharge medications provided to patient and/or family        **Please Note: This note may have been constructed using a voice recognition system**

## 2021-07-03 NOTE — PLAN OF CARE
Post-Dialysis RN Treatment Note    Blood Pressure:  Pre 185/77 mm/Hg  Post 148/83 mmHg   EDW  124 1 kg    Weight:  Pre 129 0 kg   Post 125 6 kg   Mode of weight measurement: Standing Scale   Volume Removed  3434 ml    Treatment duration 225 minutes    NS given  yes, 300 ml for cramping   Treatment shortened?  Yes, describe: shortened 15" due to cramps   Medications given during Rx Hectoral 2 mcg   Estimated Kt/V  1 34 Access type: AV fistula   Access Status: Yes, describe: maintained  during tx    Report called to primary nurse   Yes Marvel Vizcaino RN  For 4 hr tx, 4L net fluid removal, 2K bath  Problem: METABOLIC, FLUID AND ELECTROLYTES - ADULT  Goal: Electrolytes maintained within normal limits  Description: INTERVENTIONS:  - Monitor labs and assess patient for signs and symptoms of electrolyte imbalances  - Administer electrolyte replacement as ordered  - Monitor response to electrolyte replacements, including repeat lab results as appropriate  - Instruct patient on fluid and nutrition as appropriate  Outcome: Progressing  Goal: Fluid balance maintained  Description: INTERVENTIONS:  - Monitor labs   - Monitor I/O and WT  - Instruct patient on fluid and nutrition as appropriate  - Assess for signs & symptoms of volume excess or deficit  Outcome: Progressing

## 2021-07-03 NOTE — NURSING NOTE
Reviewed AVS with pt- she verbalized understanding  IJ removed per order- pt tolerated well  Pt transported safely off unit via wheelchair by this RN

## 2021-07-03 NOTE — PLAN OF CARE
Pt off unit at start of shift  BS not checked and PTT not drawn  Nurse in dialysis made aware that both needed to be completed prior to pt leaving unit         Problem: Potential for Falls  Goal: Patient will remain free of falls  Description: INTERVENTIONS:  - Educate patient/family on patient safety including physical limitations  - Instruct patient to call for assistance with activity   - Consult OT/PT to assist with strengthening/mobility   - Keep Call bell within reach  - Keep bed low and locked with side rails adjusted as appropriate  - Keep care items and personal belongings within reach  - Initiate and maintain comfort rounds  - Make Fall Risk Sign visible to staff  - Apply yellow socks and bracelet for high fall risk patients  - Consider moving patient to room near nurses station  Outcome: Progressing

## 2021-07-03 NOTE — ASSESSMENT & PLAN NOTE
· INR subtherapeutic  · Patient will be discharged home on Coumadin on subtherapeutic INR to follow with her PCP for further adjustment in her Coumadin dosage

## 2021-07-06 LAB — GLUCOSE SERPL-MCNC: 129 MG/DL (ref 65–140)

## 2021-07-07 ENCOUNTER — DOCUMENTATION (OUTPATIENT)
Dept: SOCIAL WORK | Facility: HOSPITAL | Age: 56
End: 2021-07-07

## 2021-07-07 NOTE — PROGRESS NOTES
Admission Report at [facility] sent to ordering MD nephrology and faxed to PCP  [facility] has admitted your patient to [facility] service with the following disciplines:      SN and MSW  Response needed, please respond via Tiger text  Primary focus of home health care: Pulmonary assess  Patient stated goals of care: Learn how to take medications correctly get more energy and get more therapy in knees  Anticipated visit pattern: 2w1 3w2 2w2 1w3  and next visit date: 7 9 21 Pulmonary assess medication mgt  Significant clinical findings:Post nasal drip soar throat  Intermittant lightheadedness with changes in position  Need to rest in under 20 feet ambulation due to SOB  Reports hot flashes once or twice per day  Wants counceling due to anxiety and depression  FBS 73 this am with lightheadedness  Request for additional disciplines:  Falls often due to unbalanced Please send [employer] RN verbal order for physical therapy 1 week 1 eval and treat via [employer]  Request for medication clarification: 12 medication discrepancies  Patient using latanoprost 0 005% 1 drop HS, tramadol 50 mg daily, and lyrica 25 mg HS all not on AVS    Patient is not taking Pred forte as on AVS   Patient using alprazolam 0 5 mg two tabs HS and pre HD three times per week not 0 25 mg BID PRN anxiety as on AVS   Patient using carvedilol 12 5 mg daily except HD days as stated on bottle but not 25 mg BID as on AVS    Patient missing gabapentin and mucinex  Patient using Lantus 30 units HS not 20 units as on AVS    Missing Kionex and wants script  Patient taking Zyrtec 10 mg daily not claritin 10 mg daily as on AVS but would like script for claritin     Patient taking Novlog 1 unit TID AC not 1 to 5 units TID as on AVS    Patient taking protonix 40 mg BID not Daily as on AVS    Patient taking sertraline 50 mg 1 5 tabs daily not 50 mg 1 tab daily as on AVS   Patient taking sevelamer 800 mg 2 tabs with meals but bottle states 800 mg three tabs TID with meals and one tab BID with snacks not one tab TID as on AVS    Needs follow up physician appointments scheduled:   Potential barriers to goal achievement: Three different electronic medical records  PCP in [facility], [facility] not in same system, and [facility] [place]  Other pertinent info: TC to PCP spoke to Antoinette Bernal  to confirm fax number [phone] and notify of 12 medicaition clarifications needed being sent via fax  Thank you for allowing us to participate in the care of your patient        Harmeet Weeks RN

## 2021-07-08 ENCOUNTER — TELEPHONE (OUTPATIENT)
Dept: OBGYN CLINIC | Facility: HOSPITAL | Age: 56
End: 2021-07-08

## 2021-07-08 NOTE — TELEPHONE ENCOUNTER
Patient is calling to reschedule her visco injections  Patient was hospitalized last week and states she called to cancel her 07-01-21 VISCO injection  She is calling today to cancel the second injection scheduled at 315  Patient wants to reschedule starting with the first injection next Thursday 07-15-21  I am unable to schedule three consecutive weeks with either Dr Silvia Barrett or with Zoë Villagran  Please advise      Request sent to [facility] via email

## 2021-07-12 ENCOUNTER — DOCUMENTATION (OUTPATIENT)
Dept: SOCIAL WORK | Facility: HOSPITAL | Age: 56
End: 2021-07-12

## 2021-07-12 NOTE — PROGRESS NOTES
Home PT eval    Primary focus of home health care Pulmonary  Patient stated goals of care "I want to be able to walk further"  Anticipated visit pattern and next visit date 2w3 1w1  Needs follow up physician appointments scheduled pcp tbs  Potential barriers to goal achievement fatigu  Other pertinent information none    Thank you for allowing us to participate in the care of your patient        Susana Oseguera

## 2021-08-17 ENCOUNTER — OFFICE VISIT (OUTPATIENT)
Dept: PODIATRY | Facility: CLINIC | Age: 56
End: 2021-08-17
Payer: MEDICARE

## 2021-08-17 DIAGNOSIS — E11.42 DIABETIC POLYNEUROPATHY ASSOCIATED WITH TYPE 2 DIABETES MELLITUS (HCC): Primary | ICD-10-CM

## 2021-08-17 DIAGNOSIS — L97.512 RIGHT FOOT ULCER, WITH FAT LAYER EXPOSED (HCC): ICD-10-CM

## 2021-08-17 PROCEDURE — 11042 DBRDMT SUBQ TIS 1ST 20SQCM/<: CPT | Performed by: PODIATRIST

## 2021-08-17 PROCEDURE — 99213 OFFICE O/P EST LOW 20 MIN: CPT | Performed by: PODIATRIST

## 2021-08-19 ENCOUNTER — PROCEDURE VISIT (OUTPATIENT)
Dept: OBGYN CLINIC | Facility: HOSPITAL | Age: 56
End: 2021-08-19
Payer: MEDICARE

## 2021-08-19 ENCOUNTER — TELEPHONE (OUTPATIENT)
Dept: OBGYN CLINIC | Facility: MEDICAL CENTER | Age: 56
End: 2021-08-19

## 2021-08-19 VITALS
WEIGHT: 283 LBS | SYSTOLIC BLOOD PRESSURE: 147 MMHG | BODY MASS INDEX: 45.48 KG/M2 | HEART RATE: 80 BPM | HEIGHT: 66 IN | DIASTOLIC BLOOD PRESSURE: 83 MMHG

## 2021-08-19 DIAGNOSIS — M25.561 CHRONIC PAIN OF BOTH KNEES: ICD-10-CM

## 2021-08-19 DIAGNOSIS — M25.562 CHRONIC PAIN OF BOTH KNEES: ICD-10-CM

## 2021-08-19 DIAGNOSIS — G89.29 CHRONIC PAIN OF BOTH KNEES: ICD-10-CM

## 2021-08-19 DIAGNOSIS — M17.0 BILATERAL PRIMARY OSTEOARTHRITIS OF KNEE: Primary | ICD-10-CM

## 2021-08-19 PROCEDURE — 20610 DRAIN/INJ JOINT/BURSA W/O US: CPT | Performed by: PHYSICIAN ASSISTANT

## 2021-08-19 RX ORDER — TRAMADOL HYDROCHLORIDE 50 MG/1
TABLET ORAL
COMMUNITY
Start: 2021-07-12 | End: 2021-11-21

## 2021-08-19 RX ORDER — FOLIC ACID/VIT B COMPLEX AND C 0.8 MG
1 TABLET ORAL DAILY
COMMUNITY
Start: 2021-07-06 | End: 2022-05-17 | Stop reason: SDUPTHER

## 2021-08-19 RX ORDER — SEVELAMER CARBONATE 800 MG/1
TABLET, FILM COATED ORAL
COMMUNITY
Start: 2021-06-22 | End: 2021-11-13 | Stop reason: HOSPADM

## 2021-08-19 RX ORDER — ALPRAZOLAM 0.5 MG/1
TABLET ORAL
COMMUNITY
Start: 2021-07-10 | End: 2021-11-13 | Stop reason: HOSPADM

## 2021-08-19 RX ORDER — BLOOD SUGAR DIAGNOSTIC
STRIP MISCELLANEOUS
COMMUNITY
Start: 2021-07-05

## 2021-08-19 NOTE — PROGRESS NOTES
Subjective;     61-year-old female well known to the practice  She is hemodialysis dependent and has osteoarthritis of both knees  she presents to the office today to receive a new or different type of Visco supplement agent  Previous use of Euflexxa offered her no pain relief whatsoever    She has been indicated for Synvisc 3 shot series to both knees   she is intent on receiving the 1st of the 3 injections at today's appointment    Past Medical History:   Diagnosis Date    Abnormal uterine bleeding (AUB)     Anxiety     Arthritis     Chronic kidney disease     Claustrophobia     Diabetes [facility]     Disease of thyroid gland     DVT (deep venous thrombosis) (HCC)     End stage kidney disease ([facility]     Foot ulcer due to secondary DM ([facility]     Hemodialysis patient ([facility] )     Tues, Thurs, Sat    Hypertension     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[facility] )     right eye    Morbid obesity ([facility]     Osteomyelitis of fifth toe of right foot ([facility]     Panic attacks     Pulmonary embolism (HCC)     Reflux esophagitis     Thrombophlebitis of saphenous vein     Warfarin anticoagulation        Past Surgical History:   Procedure Laterality Date    AMPUTATION      r 4th toe    ARTERIOVENOUS GRAFT PLACEMENT      CATARACT EXTRACTION Bilateral     CERVICAL BIOPSY  W/ LOOP ELECTRODE EXCISION       SECTION      DIALYSIS FISTULA CREATION      DILATION AND CURETTAGE OF UTERUS      ENDOMETRIAL ABLATION W/ NOVASURE      EYE SURGERY      HYSTERECTOMY      @ age 57 (complete)    IR AV FISTULAGRAM/GRAFTOGRAM  10/11/2019    IR AV FISTULAGRAM/GRAFTOGRAM  2020    IR AV FISTULAGRAM/GRAFTOGRAM  2020    IR NON-TUNNELED CENTRAL LINE PLACEMENT  2021    OOPHORECTOMY Bilateral     @ age 57    PERICARDIUM SURGERY      [place] AMPUTATION TOE,MT-P JT Right 2020    Procedure: AMPUTATION TOE- 5th;  Surgeon: Marylynn Cushing, DPM;  Location: [facility];  Service: Podiatry    [place] COLONOSCOPY FLX DX W/COLLJ SPEC WHEN PFRMD N/A 3/13/2019    Procedure: COLONOSCOPY;  Surgeon: Marisela Palencia MD;  Location: [facility]; Service: Gastroenterology    NC ESOPHAGOGASTRODUODENOSCOPY TRANSORAL DIAGNOSTIC N/A 3/13/2019    Procedure: ESOPHAGOGASTRODUODENOSCOPY (EGD); Surgeon: Marisela Palencia MD;  [facility]: [facility]; Service: Gastroenterology    NC LAPAROSCOPY W TOT HYSTERECT UTERUS 250 GRAM OR LESS N/A 2/16/2016    Procedure: HYSTERECTOMY LAPAROSCOPIC TOTAL ([address]), with bilateral salpingectomy;  Surgeon: Kennedy Damon DO;  [facility];  Service: Gynecology    THROMBECTOMY / ARTERIOVENOUS GRAFT REVISION      TOE SURGERY Right     removal of the 4th toe       Family History   Problem Relation Age of Onset    Heart disease Family     Diabetes Family     Heart disease Mother     Cancer Brother         neck    Throat cancer Brother     Ovarian cancer Maternal Aunt 36       Social History     Tobacco Use    Smoking status: Never Smoker    Smokeless tobacco: Never Used   Vaping Use    Vaping Use: Never used   Substance Use Topics    Alcohol use: Never     Alcohol/week: 0 0 standard drinks    Drug use: No      exam'     adult female in no acute distress   she appears comfortable with her knees flexed because of concern regarding the potential effusion extension of the knee showed appropriate landmarks the left knee had more full minutes of the tissues in the suprapatellar pouch and the proximal tibia  She does not have an effusion visually or palpably of the left knee     she does have significant medial compartment discomfort greater than lateral     Large joint arthrocentesis: R knee  Procedure Details  Location: knee - R knee  Needle size: 22 G (RP 27 G)  Medications administered: 16 mg hylan 16 MG/2ML    Patient tolerance: patient tolerated the procedure well with no immediate complications  Dressing:  Sterile dressing applied    Large joint arthrocentesis: L knee  Procedure Details  Location: knee - L knee  Needle size: 22 G (RP 27 G)  Medications administered: 16 mg hylan 16 MG/2ML    Patient tolerance: patient tolerated the procedure well with no immediate complications  Dressing:  Sterile dressing applied       impression   bilateral knee osteoarthritis   bilateral knee pain     plan;     Synvisc 3 shot series, patient received injection 1  To both knees at today's appointment from a lateral parapatellar tendon approach  she returns weekly for 2 additional weeks to conclude the 3 shot series     her experience was supervised by and plan formulated by the attending surgeon it was my privilege to assist him in the delivery of her care

## 2021-08-19 NOTE — TELEPHONE ENCOUNTER
Patient sees Dr Milli Mccauley  Patient is calling about the cane discussed in todays appointment  She is asking for a call relating this and how she can get the cane?      # [phone]

## 2021-08-19 NOTE — TELEPHONE ENCOUNTER
LVM to let pt know we have the order and can get it to her when she comes back next week, or if she wants it sooner to call and make sure our DME person is here

## 2021-08-20 ENCOUNTER — APPOINTMENT (OUTPATIENT)
Dept: LAB | Facility: CLINIC | Age: 56
End: 2021-08-20
Payer: MEDICARE

## 2021-08-20 DIAGNOSIS — Z86.718 HISTORY OF DVT (DEEP VEIN THROMBOSIS): ICD-10-CM

## 2021-08-24 ENCOUNTER — TELEPHONE (OUTPATIENT)
Dept: PODIATRY | Facility: CLINIC | Age: 56
End: 2021-08-24

## 2021-08-24 NOTE — TELEPHONE ENCOUNTER
There was a call on the voicemail from Levan Sicilian, Tula Essex visiting nurse  She states that there is a smell coming from the wound  She feels it needs to be debrided sooner  She asked that we call Ban Arrieta to get her in sooner  She does [employer] and is only available on Wednesday  I called Kelvin Manuel to let her know that our schedule is full and I was trying to get more details but I had to leave a message

## 2021-08-24 NOTE — PROGRESS NOTES
This patient was seen on 8/25/2021  My role is Foot , Ankle, and Wound Specialist    SUBJECTIVE    Chief Complaint:  Diabetic foot ulcer     Patient ID: Mia Beck is a 49 y o  female  Pt arrives for wound care follow up  Presents with dressing intact  Pt states the vna told her that she needed a "debridement"  The following portions of the patient's history were reviewed and updated as appropriate: allergies, current medications, past family history, past medical history, past social history, past surgical history and problem list     Review of Systems   Constitutional: Negative  Skin: Positive for wound  Neurological: Positive for numbness  Psychiatric/Behavioral: The patient is nervous/anxious  OBJECTIVE      /57   Pulse 88   Wt 126 kg (277 lb)   LMP 02/12/2016 (Exact Date)   BMI 44 71 kg/m²     Foot/Ankle Musculoskeletal Exam    General      Neurological: alert       Physical Exam  Vitals and nursing note reviewed  Constitutional:       General: She is not in acute distress  Appearance: Normal appearance  She is obese  She is not ill-appearing, toxic-appearing or diaphoretic  Neurological:      Mental Status: She is alert             Wound # 1  Location: right dorsal foot  Length 1cm: Width 0 5cm: Depth 0 2cm:   Deepest Tissue Noted in Base: SQ  Probe to Bone?: no  Peripheral Skin Description: intact  Granulation: 80% Fibrotic Tissue: 20% Necrotic Tissue: 0%  Drainage Amount: minimal  Signs of Infection: no  Total debrided 0 5 square centimeters     Superficial scratch- healed    ASSESSMENT     Diagnoses and all orders for this visit:    Diabetic polyneuropathy associated with type 2 diabetes mellitus ([address] )    Right foot ulcer, with fat layer exposed ([facility] )         Problem List Items Addressed This Visit        Endocrine    Diabetic polyneuropathy associated with type 2 diabetes mellitus ([address] ) - Primary       Other    Right foot ulcer, with fat layer exposed ([address] ) PLAN  DEBRIDEMENT NOTE     A formal timeout including patient identification, laterality and existing allergies [facility] was conducted      Procedure Performed: Debridement of subcutaneous tissue- >20 sq cm (95100)  Under aseptic technique, the wound was thoroughly explored and bluntly probed for undermining, deep sinus tracts and bone involvement  Sterile instrumentation including scalpel, forceps and curette used to excise the clearly delineated devitalized subcutaneous tissue (fibrotic tissue and necrotic non-viable portions of fat) exposing viable tissue  After debridement, bleeding in the wound bed base was noted indicated viable subcutaneous tissue had been exposed  Bleeding controlled with gentle pressure  Patient tolerated debridement without complications  The wound was dressed          Wound dressing technique and frequency described to patient  I am to be called if any signs of infection develop such as erythema, increased wound size or significant change in appearance of wound, odor, pain, increased or change in color of drainage, swelling, fever, chills, nightsweats  I reviewed these signs with the patient  I recommended limiting WB to bathroom priveleges and meal table and to use any prescribed offloading devices in an effort to allow proper rest and healing of the wounded area  I explained that good wound care and compliance are necessary to allow healing and to prevent toe loss or limb loss       Silver alginate applied to wound bed, covered with gauze and secured with tape  To be changed by vna 3x week using dermagran gauze  Orders faxed to [facility]  Pt to wear surgical shoe with every step   Pt to return in 2 weeks

## 2021-08-24 NOTE — TELEPHONE ENCOUNTER
Called [facility] and left message stating pt already has an appointment tomorrow with Dr Vilma Mart  8/25/2021 at 1137

## 2021-08-25 ENCOUNTER — OFFICE VISIT (OUTPATIENT)
Dept: PODIATRY | Facility: CLINIC | Age: 56
End: 2021-08-25
Payer: MEDICARE

## 2021-08-25 VITALS
SYSTOLIC BLOOD PRESSURE: 104 MMHG | BODY MASS INDEX: 44.71 KG/M2 | DIASTOLIC BLOOD PRESSURE: 57 MMHG | WEIGHT: 277 LBS | HEART RATE: 88 BPM

## 2021-08-25 DIAGNOSIS — L97.512 RIGHT FOOT ULCER, WITH FAT LAYER EXPOSED (HCC): ICD-10-CM

## 2021-08-25 DIAGNOSIS — E11.42 DIABETIC POLYNEUROPATHY ASSOCIATED WITH TYPE 2 DIABETES MELLITUS (HCC): Primary | ICD-10-CM

## 2021-08-25 PROCEDURE — 99213 OFFICE O/P EST LOW 20 MIN: CPT | Performed by: PODIATRIST

## 2021-08-25 PROCEDURE — 11042 DBRDMT SUBQ TIS 1ST 20SQCM/<: CPT | Performed by: PODIATRIST

## 2021-08-25 NOTE — PATIENT INSTRUCTIONS
Debridement   WHAT YOU NEED TO KNOW:   Debridement is the removal of infected, damaged, or dead tissue so a wound can heal properly  You may need more than one debridement  DISCHARGE INSTRUCTIONS:   Medicines:   · Medicines  can help decrease pain or prevent or treat an infection  · Take your medicine as directed  Contact your healthcare provider if you think your medicine is not helping or if you have side effects  Tell him of her if you are allergic to any medicine  Keep a list of the medicines, vitamins, and herbs you take  Include the amounts, and when and why you take them  Bring the list or the pill bottles to follow-up visits  Carry your medicine list with you in case of an emergency  Follow up with your healthcare provider as directed: You may need to return to have your wound checked  Write down your questions so you remember to ask them during your visits  Care for your wound as directed:   · Keep your wound clean and dry  You may need to cover your wound when you bathe  · Limit movements,  such as stretching, to prevent bleeding, tearing, and swelling in your wound  · Protect your wound  Avoid sunlight for at least 6 months  Apply mild, unscented lotion or cream to the skin around your wound to keep it moist     · Do not smoke  If you smoke, it is never too late to quit  Smoking decreases blood flow to the wound and delays healing  Ask for information if you need help quitting  · Drink liquids as directed  Ask how much liquid to drink each day and which liquids are best for you  Liquids help keep your skin moist so your wound can heal      · Eat a variety of healthy foods  Foods rich in protein, such as meat, eggs, and dairy products, help repair tissue  Carbohydrate-rich foods, such as bread and cereals, help increase cell growth and decrease the risk for wound infection  Do not have caffeine  Ask if you should take vitamins   Vitamins A and C may help tissue formation and increase scar tissue strength  Contact your healthcare provider if:   · You have a fever  · Your pain gets worse or does not go away, even after treatment  · Your skin is red, swollen, or draining pus  · You have questions or concerns about your condition or care  Return to the emergency department if:   · Blood soaks through your bandage  · You have severe pain  © Copyright [employer] 2021 Information is for End User's use only and may not be sold, redistributed or otherwise used for commercial purposes  All illustrations and images included in CareNotes® are the copyrighted property of [employer]  or [facility] [place] Carolyn Astorga   The above information is an  only  It is not intended as medical advice for individual conditions or treatments  Talk to your doctor, nurse or pharmacist before following any medical regimen to see if it is safe and effective for you

## 2021-08-26 ENCOUNTER — PROCEDURE VISIT (OUTPATIENT)
Dept: OBGYN CLINIC | Facility: HOSPITAL | Age: 56
End: 2021-08-26
Payer: MEDICARE

## 2021-08-26 VITALS — BODY MASS INDEX: 44.71 KG/M2 | HEIGHT: 66 IN

## 2021-08-26 DIAGNOSIS — G89.29 CHRONIC PAIN OF BOTH KNEES: ICD-10-CM

## 2021-08-26 DIAGNOSIS — M25.561 CHRONIC PAIN OF BOTH KNEES: ICD-10-CM

## 2021-08-26 DIAGNOSIS — M25.562 CHRONIC PAIN OF BOTH KNEES: ICD-10-CM

## 2021-08-26 DIAGNOSIS — M17.0 PRIMARY OSTEOARTHRITIS OF BOTH KNEES: Primary | ICD-10-CM

## 2021-08-26 PROCEDURE — 20610 DRAIN/INJ JOINT/BURSA W/O US: CPT | Performed by: ORTHOPAEDIC SURGERY

## 2021-08-26 RX ORDER — LIDOCAINE HYDROCHLORIDE 10 MG/ML
4 INJECTION, SOLUTION INFILTRATION; PERINEURAL
Status: COMPLETED | OUTPATIENT
Start: 2021-08-26 | End: 2021-08-26

## 2021-08-26 RX ADMIN — LIDOCAINE HYDROCHLORIDE 4 ML: 10 INJECTION, SOLUTION INFILTRATION; PERINEURAL at 15:44

## 2021-08-26 NOTE — PROGRESS NOTES
Assessment:   Diagnosis ICD-10-CM Associated Orders   1  Primary osteoarthritis of both knees  M17 0 Large joint arthrocentesis: bilateral knee   2  Chronic pain of both knees  M25 561 Large joint arthrocentesis: bilateral knee    M25 562     G89 29        Plan:    Bilateral knees known osteoarthritis  Patient returns today for the 2nd of 3 Synvisc injections  She tolerated today's injections very well (RP)  Ice and post injection protocol advised  Weightbearing activities as tolerated  Patient will follow up next week to complete the Visco series  To do next visit:  Return in about 1 week (around 9/2/2021) for re-check and synvisc #3 B/L knees  The above stated was discussed in layman's terms and the patient expressed understanding  All questions were answered to the patient's satisfaction  Scribe Attestation    I,:  Yeni Mendoza am acting as a scribe while in the presence of the attending physician :       I,:  Sampson Hodge MD personally performed the services described in this documentation    as scribed in my presence :             Subjective:   Cristina Turner is a 49 y o  female who presents  today for repeat evaluation of her bilateral knees, known osteoarthritis  She returns today for the 2nd of 3 Synvisc injections to both knees  She tolerated last week's injections rather well  There is no indication not to proceed with today's 2nd Visco injection  While in the waiting room a medical emergency code was initiated where she was brought back into the exam room and was having a vasovagal reaction prior to her Visco injections        Review of systems negative unless otherwise specified in HPI  Review of Systems    Past Medical History:   Diagnosis Date    Abnormal uterine bleeding (AUB)     Anxiety     Arthritis     Chronic kidney disease     Claustrophobia     Diabetes mellitus ([address] )     Disease of thyroid gland     DVT (deep venous thrombosis) (HCC)     End [facility] disease [facility] [facility]     Foot ulcer due to secondary DM ([facility] [address] [facility]     Hemodialysis patient ([facility] [address] )     Tues, Thurs, Sat    Hypertension     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[facility] [address] )     right eye    Morbid obesity ([facility] [address] )     Osteomyelitis of fifth toe of right foot ([facility] [address] [facility]     Panic attacks     Pulmonary embolism (HCC)     Reflux esophagitis     Thrombophlebitis of saphenous vein     Warfarin anticoagulation        Past Surgical History:   Procedure Laterality Date    AMPUTATION      r 4th toe    ARTERIOVENOUS GRAFT PLACEMENT      CATARACT EXTRACTION Bilateral     CERVICAL BIOPSY  W/ LOOP ELECTRODE EXCISION       SECTION      DIALYSIS FISTULA CREATION      DILATION AND CURETTAGE OF UTERUS      ENDOMETRIAL ABLATION W/ NOVASURE      EYE SURGERY      HYSTERECTOMY      @ age 57 (complete)    IR AV FISTULAGRAM/GRAFTOGRAM  10/11/2019    IR AV FISTULAGRAM/GRAFTOGRAM  2020    IR AV FISTULAGRAM/GRAFTOGRAM  2020    IR NON-TUNNELED CENTRAL LINE PLACEMENT  2021    OOPHORECTOMY Bilateral     @ age 57    PERICARDIUM SURGERY      MT AMPUTATION TOE,MT-P JT Right 2020    Procedure: AMPUTATION TOE- 5th;  Surgeon: Kirstie Cortez DPM;  [facility];  Service: Podiatry    MT COLONOSCOPY FLX DX W/COLLJ SPEC WHEN PFRMD N/A 3/13/2019    Procedure: COLONOSCOPY;  Surgeon: Shadi King MD;  [facility]; Service: Gastroenterology    MT ESOPHAGOGASTRODUODENOSCOPY TRANSORAL DIAGNOSTIC N/A 3/13/2019    Procedure: ESOPHAGOGASTRODUODENOSCOPY (EGD); Surgeon: Shadi King MD;  [facility];   Service: Gastroenterology    MT LAPAROSCOPY W TOT HYSTERECT UTERUS 250 GRAM OR LESS N/A 2016    Procedure: HYSTERECTOMY LAPAROSCOPIC TOTAL [facility]), with bilateral salpingectomy;  Surgeon: Yari Weeks DO;  [facility];  Service: Gynecology    THROMBECTOMY / ARTERIOVENOUS GRAFT REVISION      TOE SURGERY Right     removal of the 4th toe       Family History   Problem Relation Age of Onset    Heart disease Family     Diabetes Family     Heart disease Mother     Cancer Brother         neck    Throat cancer Brother     Ovarian cancer Maternal Aunt 36       Social History     Occupational History    Not on file   Tobacco Use    Smoking status: Never Smoker    Smokeless tobacco: Never Used   Vaping Use    Vaping Use: Never used   Substance and Sexual Activity    Alcohol use: Never     Alcohol/week: 0 0 standard drinks    Drug use: No    Sexual activity: Not Currently     Partners: Male     Birth control/protection: Abstinence         Current Outpatient Medications:     Accu-Chek Guide test strip, use to TEST BLOOD SUGAR two to three times a day, Disp: , Rfl:     Accu-Chek Softclix Lancets lancets, 3 (three) times a day Use to test blood sugar, Disp: , Rfl:     acetaminophen (TYLENOL) 325 mg tablet, Take 2 tablets (650 mg total) by mouth every 6 (six) hours as needed for mild pain or fever, Disp: 30 tablet, Rfl: 0    albuterol (ProAir HFA) 90 mcg/act inhaler, Inhale 2 puffs every 6 (six) hours as needed for wheezing or shortness of breath, Disp: 8 5 g, Rfl: 0    ALPRAZolam (XANAX) 0 25 mg tablet, Take 0 25 mg by mouth 2 (two) times a day as needed for anxiety, Disp: , Rfl:     ALPRAZolam (XANAX) 0 5 mg tablet, take 1 tablet by mouth at bedtime and 2 ADDITIONAL TABLETS 3 TIMES WEEKLY ON DIALYSIS DAYS, Disp: , Rfl:     atorvastatin (LIPITOR) 20 mg tablet, Take 20 mg by mouth every evening , Disp: , Rfl: 0    atropine (ISOPTO ATROPINE) 1 % ophthalmic solution, Administer 1 drop to both eyes daily at bedtime , Disp: , Rfl:     B Complex-C-Folic Acid (Natalia-Denys) TABS, Take 1 tablet by mouth daily, Disp: , Rfl:     benzonatate (TESSALON PERLES) 100 mg capsule, Take 1 capsule (100 mg total) by mouth 3 (three) times a day as needed for cough, Disp: 20 capsule, Rfl: 0    brimonidine (ALPHAGAN P) 0 15 % ophthalmic solution, Administer 1 drop to both eyes 2 (two) times a day , Disp: , Rfl:     CALCIUM CARBONATE PO, Take 1,000 mg by mouth daily  , Disp: , Rfl:     carvedilol (COREG) 25 mg tablet, Take 25 mg by mouth 2 (two) times a day with meals  , Disp: , Rfl:     cinacalcet (SENSIPAR) 30 mg tablet, Take 30 mg by mouth daily, Disp: , Rfl:     diclofenac sodium (VOLTAREN) 1 %, Apply 2 g topically 4 (four) times a day, Disp: 200 g, Rfl: 0    diphenhydrAMINE (BENADRYL) 25 mg capsule, Take by mouth as needed for itching  , Disp: , Rfl:     gabapentin (NEURONTIN) 100 mg capsule, Take 3 capsules (300 mg total) by mouth daily at bedtime, Disp: , Rfl: 0    insulin glargine (LANTUS) 100 units/mL subcutaneous injection, Inject 20 Units under the skin daily at bedtime, Disp: 10 mL, Rfl: 0    KIONEX 15 GM/60ML suspension, Take by mouth Takes as a shake on dialysis days Tues-Thurs_Sat, Disp: , Rfl: 0    levothyroxine 50 mcg tablet, Take 50 mcg by mouth daily, Disp: , Rfl:     lidocaine (LIDODERM) 5 %, APPLY 1 PATCH BY TRANDERMAL ROUTE EVERY DAY (MAY WEAR UP TO 12 HOURS)  , Disp: , Rfl:     loratadine (CLARITIN) 10 mg tablet, Take 10 mg by mouth daily, Disp: , Rfl:     NIFEdipine (ADALAT CC) 30 MG 24 hr tablet, Take 1 tablet (30 mg total) by mouth daily, Disp: 30 tablet, Rfl: 0    NOVOLOG FLEXPEN 100 units/mL SOPN, INJECT 1 TO 5 UNITS SUBCUTANEOUSLY THREE TIMES A DAY BEFORE MELAS AS PER SLIDING SCALE, Disp: , Rfl:     nystatin (MYCOSTATIN) powder, Apply topically 2 (two) times a day, Disp: 15 g, Rfl: 0    ondansetron (ZOFRAN) 4 mg tablet, Take 1 tablet (4 mg total) by mouth every 8 (eight) hours as needed for nausea or vomiting, Disp: 12 tablet, Rfl: 0    pantoprazole (PROTONIX) 40 mg tablet, Take 1 tablet (40 mg total) by mouth daily in the early morning, Disp: , Rfl: 0    Podiatric Products (Flexitol Heel Balm) OINT, Apply topically once for 1 dose, Disp: 112 g, Rfl: 2    prednisoLONE acetate (PRED FORTE) 1 % ophthalmic suspension, Administer 1 drop to both eyes 4 (four) times a day Patient takes only occasionally, Disp: , Rfl:     senna (SENOKOT) 8 6 mg, Take 2 tablets (17 2 mg total) by mouth daily at bedtime as needed for constipation, Disp: 30 each, Rfl: 0    sertraline (ZOLOFT) 50 mg tablet, Take 1 tablet (50 mg total) by mouth daily, Disp: 30 tablet, Rfl: 0    sevelamer (RENAGEL) 800 mg tablet, Take 1 tablet (800 mg total) by mouth 3 (three) times a day with meals, Disp: 30 tablet, Rfl: 0    sevelamer carbonate (RENVELA) 800 mg tablet, TAKE 3 TABLETS 3 TIMES A DAY WITH MEALS AND 1 TABLET TWICE A DAY WITH SNACKS, Disp: , Rfl:     sodium chloride (OCEAN) 0 65 % nasal spray, 1 spray into each nostril 3 (three) times a day as needed for congestion, Disp: 30 mL, Rfl: 0    torsemide (DEMADEX) 100 mg tablet, Take 100 mg by mouth every 12 (twelve) hours , Disp: , Rfl: 0    traMADol (ULTRAM) 50 mg tablet, PLEASE SEE ATTACHED FOR DETAILED DIRECTIONS, Disp: , Rfl:     warfarin (COUMADIN) 3 mg tablet, Take 3 tablets (9 mg total) by mouth daily Takes 9 mg Monday Sat, Disp: 10 tablet, Rfl: 0    Allergies   Allergen Reactions    Iodinated Diagnostic Agents Itching            Vitals:       Objective:                    Right Knee Exam     Muscle Strength   The patient has normal right knee strength  Tenderness   The patient is experiencing tenderness in the medial joint line and lateral joint line  Range of Motion   Extension: 0   Flexion: 110     Other   Erythema: absent  Sensation: normal  Swelling: mild  Effusion: no effusion present    Comments:    Valgus alignment      Left Knee Exam     Muscle Strength   The patient has normal left knee strength  Tenderness   The patient is experiencing tenderness in the lateral joint line and medial joint line      Range of Motion   Extension: 0   Flexion: 110     Other   Erythema: absent  Sensation: normal  Swelling: mild  Effusion: no effusion present    Comments:    Valgus alignment            Diagnostics, reviewed and taken today if performed as documented:    None performed          Procedures, if performed today:    Large joint arthrocentesis: bilateral knee  Universal Protocol:  Consent: Verbal consent obtained  Risks and benefits: risks, benefits and alternatives were discussed  Consent given by: patient  Time out: Immediately prior to procedure a "time out" was called to verify the correct patient, procedure, equipment, support staff and site/side marked as required  Timeout called at: 8/26/2021 3:39 PM   Patient understanding: patient states understanding of the procedure being performed  Site marked: the operative site was marked  Patient identity confirmed: verbally with patient    Supporting Documentation  Indications: pain and diagnostic evaluation   Procedure Details  Location: knee - bilateral knee  Preparation: Patient was prepped and draped in the usual sterile fashion  Needle size: 22 G  Ultrasound guidance: no  Approach: anterolateral    Medications (Right): 4 mL lidocaine 1 %; 16 mg hylan 16 MG/2MLMedications (Left): 4 mL lidocaine 1 %; 16 mg hylan 16 MG/2ML   Patient tolerance: patient tolerated the procedure well with no immediate complications  Dressing:  Sterile dressing applied            Portions of the record may have been created with voice recognition software  Occasional wrong word or "sound a like" substitutions may have occurred due to the inherent limitations of voice recognition software  Read the chart carefully and recognize, using context, where substitutions have occurred

## 2021-08-30 ENCOUNTER — TELEPHONE (OUTPATIENT)
Dept: OBGYN CLINIC | Facility: HOSPITAL | Age: 56
End: 2021-08-30

## 2021-08-30 NOTE — TELEPHONE ENCOUNTER
Dr Gutierrez [facility]    [phone]    [facility] states that she has an open wound on her right foot     She is requesting a new order to be placed to continue nursing to come to her home  Please advise

## 2021-09-01 ENCOUNTER — TELEPHONE (OUTPATIENT)
Dept: PODIATRY | Facility: CLINIC | Age: 56
End: 2021-09-01

## 2021-09-02 ENCOUNTER — TELEPHONE (OUTPATIENT)
Dept: OBGYN CLINIC | Facility: HOSPITAL | Age: 56
End: 2021-09-02

## 2021-09-03 NOTE — TELEPHONE ENCOUNTER
LVM for the patient to call me back regarding an appt with Dr Codey Estes for her 3rd Orthovisc injection, my contact number was left for the patient

## 2021-09-07 NOTE — PROGRESS NOTES
This patient was seen on 9/8/2021  My role is Foot , Ankle, and Wound Specialist    SUBJECTIVE    Chief Complaint:  DFU     Patient ID: Yeyo Rodrigez is a 49 y o  female  Pt arrives for wound care follow up  Presents with dressing intact  Office was informed by vna that they disharged her due to her not being homebound and going to [place] for vacation  She admits to being on the foot "a lot" in [facility]  The following portions of the patient's history were reviewed and updated as appropriate: allergies, current medications, past family history, past medical history, past social history, past surgical history and problem list     Review of Systems   Constitutional: Negative  Skin: Positive for wound  Neurological: Positive for numbness  Psychiatric/Behavioral: The patient is nervous/anxious  OBJECTIVE      BP (!) 173/93   Pulse 74   Wt 128 kg (283 lb)   LMP 02/12/2016 (Exact Date)   BMI 45 68 kg/m²     Foot/Ankle Musculoskeletal Exam    General      Neurological: alert       Physical Exam  Vitals and nursing note reviewed  Constitutional:       General: She is not in acute distress  Appearance: Normal appearance  She is obese  She is not ill-appearing, toxic-appearing or diaphoretic  Neurological:      Mental Status: She is alert             Wound # 1  Location: right dorsal foot  Length 1cm: Width 1cm: Depth 0 2cm:   Deepest Tissue Noted in Base: SQ  Probe to Bone?: no  Peripheral Skin Description: intact  Granulation: 80% Fibrotic Tissue: 20% Necrotic Tissue: 0%  Drainage Amount: minimal  Signs of Infection: no  Total debrided 1square centimeters  ASSESSMENT     Diagnoses and all orders for this visit:    Type 2 diabetes mellitus with diabetic polyneuropathy, unspecified whether long term insulin use (HCC)    Right foot ulcer, with fat layer exposed ([facility] [address] )         Problem List Items Addressed This Visit        Endocrine    Diabetes mellitus with neurological manifestation ([facility] [address] ) - Primary       Other    Right foot ulcer, with fat layer exposed ([address] )          PLAN    I was very straightforward with her about her high activity level not being in alignment with the goals I had set our for her previously (staying off of the foot as much as possible to allow healing)  The wound was debrided  A formal timeout including patient identification, laterality and existing allergies using [facility] protocol was conducted  Procedure Performed: Debridement of subcutaneous tissue- >20 sq cm (75793)  Under aseptic technique, the wound was thoroughly explored and bluntly probed for undermining, deep sinus tracts and bone involvement  Sterile instrumentation including scalpel, forceps and curette used to excise the clearly delineated devitalized subcutaneous tissue (fibrotic tissue and necrotic non-viable portions of fat) exposing viable tissue  After debridement, bleeding in the wound bed base was noted indicated viable subcutaneous tissue had been exposed  Bleeding controlled with gentle pressure  Patient tolerated debridement without complications  The wound was dressed  Wound dressing technique and frequency described to patient  I am to be called if any signs of infection develop such as erythema, increased wound size or significant change in appearance of wound, odor, pain, increased or change in color of drainage, swelling, fever, chills, nightsweats  I reviewed these signs with the patient  I recommended limiting WB to bathroom priveleges and meal table and to use any prescribed offloading devices in an effort to allow proper rest and healing of the wounded area  I explained that good wound care and compliance are necessary to allow healing and to prevent toe loss or limb loss  Silver alginate applied , gauze and tape  To be changed every other day using dermagran gauze , gauze and wandy  Pt states she has supplies and will change the dressing herself  Pt to return in one week

## 2021-09-08 ENCOUNTER — PROCEDURE VISIT (OUTPATIENT)
Dept: PODIATRY | Facility: CLINIC | Age: 56
End: 2021-09-08
Payer: MEDICARE

## 2021-09-08 VITALS
BODY MASS INDEX: 45.68 KG/M2 | DIASTOLIC BLOOD PRESSURE: 93 MMHG | HEART RATE: 74 BPM | SYSTOLIC BLOOD PRESSURE: 173 MMHG | WEIGHT: 283 LBS

## 2021-09-08 DIAGNOSIS — L97.512 RIGHT FOOT ULCER, WITH FAT LAYER EXPOSED (HCC): ICD-10-CM

## 2021-09-08 DIAGNOSIS — E11.42 TYPE 2 DIABETES MELLITUS WITH DIABETIC POLYNEUROPATHY, UNSPECIFIED WHETHER LONG TERM INSULIN USE (HCC): Primary | ICD-10-CM

## 2021-09-08 PROCEDURE — 99213 OFFICE O/P EST LOW 20 MIN: CPT | Performed by: PODIATRIST

## 2021-09-08 PROCEDURE — 11042 DBRDMT SUBQ TIS 1ST 20SQCM/<: CPT | Performed by: PODIATRIST

## 2021-09-09 ENCOUNTER — PROCEDURE VISIT (OUTPATIENT)
Dept: OBGYN CLINIC | Facility: HOSPITAL | Age: 56
End: 2021-09-09
Payer: MEDICARE

## 2021-09-09 VITALS
HEIGHT: 66 IN | HEART RATE: 89 BPM | SYSTOLIC BLOOD PRESSURE: 91 MMHG | DIASTOLIC BLOOD PRESSURE: 57 MMHG | BODY MASS INDEX: 45.68 KG/M2

## 2021-09-09 DIAGNOSIS — M17.0 PRIMARY OSTEOARTHRITIS OF BOTH KNEES: ICD-10-CM

## 2021-09-09 DIAGNOSIS — M54.50 LOW BACK PAIN, UNSPECIFIED BACK PAIN LATERALITY, UNSPECIFIED CHRONICITY, UNSPECIFIED WHETHER SCIATICA PRESENT: Primary | ICD-10-CM

## 2021-09-09 DIAGNOSIS — M25.561 CHRONIC PAIN OF BOTH KNEES: ICD-10-CM

## 2021-09-09 DIAGNOSIS — M25.562 CHRONIC PAIN OF BOTH KNEES: ICD-10-CM
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
## 2021-09-09 DIAGNOSIS — G89.29 CHRONIC PAIN OF BOTH KNEES: ICD-10-CM

## 2021-09-09 PROCEDURE — 99213 OFFICE O/P EST LOW 20 MIN: CPT | Performed by: PHYSICIAN ASSISTANT

## 2021-09-09 PROCEDURE — 20610 DRAIN/INJ JOINT/BURSA W/O US: CPT | Performed by: PHYSICIAN ASSISTANT
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
## 2021-09-13 ENCOUNTER — OFFICE VISIT (OUTPATIENT)
Dept: PAIN MEDICINE | Facility: CLINIC | Age: 56
End: 2021-09-13
Payer: MEDICARE

## 2021-09-13 ENCOUNTER — HOSPITAL ENCOUNTER (INPATIENT)
Facility: HOSPITAL | Age: 56
LOS: 1 days | Discharge: HOME HEALTH | DRG: 391 | End: 2021-09-15
Attending: EMERGENCY MEDICINE | Admitting: HOSPITALIST
Payer: MEDICARE

## 2021-09-13 ENCOUNTER — APPOINTMENT (EMERGENCY)
Dept: RADIOLOGY | Facility: HOSPITAL | Age: 56
DRG: 391 | End: 2021-09-13
Payer: MEDICARE

## 2021-09-13 VITALS
BODY MASS INDEX: 45.68 KG/M2 | HEART RATE: 73 BPM | SYSTOLIC BLOOD PRESSURE: 153 MMHG | DIASTOLIC BLOOD PRESSURE: 77 MMHG | HEIGHT: 66 IN

## 2021-09-13 DIAGNOSIS — G89.4 CHRONIC PAIN SYNDROME: Primary | ICD-10-CM

## 2021-09-13 DIAGNOSIS — M54.50 LOW BACK PAIN, UNSPECIFIED BACK PAIN LATERALITY, UNSPECIFIED CHRONICITY, UNSPECIFIED WHETHER SCIATICA PRESENT: ICD-10-CM

## 2021-09-13 DIAGNOSIS — L97.512 RIGHT FOOT ULCER, WITH FAT LAYER EXPOSED (HCC): ICD-10-CM

## 2021-09-13 DIAGNOSIS — M54.40 LOW BACK PAIN WITH SCIATICA, SCIATICA LATERALITY UNSPECIFIED, UNSPECIFIED BACK PAIN LATERALITY, UNSPECIFIED CHRONICITY: ICD-10-CM

## 2021-09-13 DIAGNOSIS — K57.92 DIVERTICULITIS: Primary | ICD-10-CM

## 2021-09-13 DIAGNOSIS — E11.42 DIABETIC POLYNEUROPATHY ASSOCIATED WITH TYPE 2 DIABETES MELLITUS (HCC): ICD-10-CM

## 2021-09-13 DIAGNOSIS — K57.32 SIGMOID DIVERTICULITIS: ICD-10-CM

## 2021-09-13 DIAGNOSIS — Z99.2 ESRD ON HEMODIALYSIS (HCC): ICD-10-CM
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
## 2021-09-13 DIAGNOSIS — M54.16 LUMBAR RADICULOPATHY: ICD-10-CM

## 2021-09-13 DIAGNOSIS — M79.671 RIGHT FOOT PAIN: ICD-10-CM

## 2021-09-13 DIAGNOSIS — N18.6 ESRD ON HEMODIALYSIS (HCC): ICD-10-CM

## 2021-09-13 PROBLEM — E66.01 MORBID OBESITY (HCC): Chronic | Status: ACTIVE | Noted: 2020-03-12

## 2021-09-13 PROBLEM — D64.9 ANEMIA: Chronic | Status: ACTIVE | Noted: 2019-08-26

## 2021-09-13 PROBLEM — R10.32 ACUTE LEFT LOWER QUADRANT PAIN: Status: ACTIVE | Noted: 2021-09-13

## 2021-09-13 LAB
ALBUMIN SERPL BCP-MCNC: 2.6 G/DL (ref 3.5–5)
ALP SERPL-CCNC: 146 U/L (ref 46–116)
ALT SERPL W P-5'-P-CCNC: 22 U/L (ref 12–78)
ANION GAP SERPL CALCULATED.3IONS-SCNC: 7 MMOL/L (ref 4–13)
AST SERPL W P-5'-P-CCNC: 12 U/L (ref 5–45)
BASOPHILS # BLD AUTO: 0.03 THOUSANDS/ΜL (ref 0–0.1)
BASOPHILS NFR BLD AUTO: 0 % (ref 0–1)
BILIRUB SERPL-MCNC: 0.39 MG/DL (ref 0.2–1)
BUN SERPL-MCNC: 79 MG/DL (ref 5–25)
CALCIUM ALBUM COR SERPL-MCNC: 9.6 MG/DL (ref 8.3–10.1)
CALCIUM SERPL-MCNC: 8.5 MG/DL (ref 8.3–10.1)
CHLORIDE SERPL-SCNC: 102 MMOL/L (ref 100–108)
CO2 SERPL-SCNC: 25 MMOL/L (ref 21–32)
CREAT SERPL-MCNC: 9.02 MG/DL (ref 0.6–1.3)
EOSINOPHIL # BLD AUTO: 0.15 THOUSAND/ΜL (ref 0–0.61)
EOSINOPHIL NFR BLD AUTO: 2 % (ref 0–6)
ERYTHROCYTE [DISTWIDTH] IN BLOOD BY AUTOMATED COUNT: 13.7 % (ref 11.6–15.1)
GFR SERPL CREATININE-BSD FRML MDRD: 4 ML/MIN/1.73SQ M
GLUCOSE SERPL-MCNC: 166 MG/DL (ref 65–140)
GLUCOSE SERPL-MCNC: 167 MG/DL (ref 65–140)
HCT VFR BLD AUTO: 26.4 % (ref 34.8–46.1)
HGB BLD-MCNC: 8.6 G/DL (ref 11.5–15.4)
IMM GRANULOCYTES # BLD AUTO: 0.08 THOUSAND/UL (ref 0–0.2)
IMM GRANULOCYTES NFR BLD AUTO: 1 % (ref 0–2)
INR PPP: 3.66 (ref 0.84–1.19)
LIPASE SERPL-CCNC: 48 U/L (ref 73–393)
LYMPHOCYTES # BLD AUTO: 1.18 THOUSANDS/ΜL (ref 0.6–4.47)
LYMPHOCYTES NFR BLD AUTO: 14 % (ref 14–44)
MCH RBC QN AUTO: 31.7 PG (ref 26.8–34.3)
MCHC RBC AUTO-ENTMCNC: 32.6 G/DL (ref 31.4–37.4)
MCV RBC AUTO: 97 FL (ref 82–98)
MONOCYTES # BLD AUTO: 0.67 THOUSAND/ΜL (ref 0.17–1.22)
MONOCYTES NFR BLD AUTO: 8 % (ref 4–12)
NEUTROPHILS # BLD AUTO: 6.65 THOUSANDS/ΜL (ref 1.85–7.62)
NEUTS SEG NFR BLD AUTO: 75 % (ref 43–75)
NRBC BLD AUTO-RTO: 0 /100 WBCS
PLATELET # BLD AUTO: 168 THOUSANDS/UL (ref 149–390)
PMV BLD AUTO: 11.2 FL (ref 8.9–12.7)
POTASSIUM SERPL-SCNC: 5.5 MMOL/L (ref 3.5–5.3)
PROT SERPL-MCNC: 6.7 G/DL (ref 6.4–8.2)
PROTHROMBIN TIME: 34.5 SECONDS (ref 11.6–14.5)
RBC # BLD AUTO: 2.71 MILLION/UL (ref 3.81–5.12)
SODIUM SERPL-SCNC: 134 MMOL/L (ref 136–145)
WBC # BLD AUTO: 8.76 THOUSAND/UL (ref 4.31–10.16)

## 2021-09-13 PROCEDURE — 74176 CT ABD & PELVIS W/O CONTRAST: CPT

## 2021-09-13 PROCEDURE — 96374 THER/PROPH/DIAG INJ IV PUSH: CPT

## 2021-09-13 PROCEDURE — 82948 REAGENT STRIP/BLOOD GLUCOSE: CPT

## 2021-09-13 PROCEDURE — 85610 PROTHROMBIN TIME: CPT | Performed by: HOSPITALIST

## 2021-09-13 PROCEDURE — 99285 EMERGENCY DEPT VISIT HI MDM: CPT

## 2021-09-13 PROCEDURE — 85025 COMPLETE CBC W/AUTO DIFF WBC: CPT | Performed by: EMERGENCY MEDICINE

## 2021-09-13 PROCEDURE — 36415 COLL VENOUS BLD VENIPUNCTURE: CPT | Performed by: EMERGENCY MEDICINE

## 2021-09-13 PROCEDURE — 99220 PR INITIAL OBSERVATION CARE/DAY 70 MINUTES: CPT | Performed by: HOSPITALIST

## 2021-09-13 PROCEDURE — 83690 ASSAY OF LIPASE: CPT | Performed by: EMERGENCY MEDICINE

## 2021-09-13 PROCEDURE — 99214 OFFICE O/P EST MOD 30 MIN: CPT | Performed by: NURSE PRACTITIONER

## 2021-09-13 PROCEDURE — 99285 EMERGENCY DEPT VISIT HI MDM: CPT | Performed by: EMERGENCY MEDICINE

## 2021-09-13 PROCEDURE — 96375 TX/PRO/DX INJ NEW DRUG ADDON: CPT

## 2021-09-13 PROCEDURE — 80053 COMPREHEN METABOLIC PANEL: CPT | Performed by: EMERGENCY MEDICINE

## 2021-09-13 PROCEDURE — 73630 X-RAY EXAM OF FOOT: CPT

## 2021-09-13 RX ORDER — WARFARIN SODIUM 3 MG/1
9 TABLET ORAL
Status: DISCONTINUED | OUTPATIENT
Start: 2021-09-14 | End: 2021-09-14

## 2021-09-13 RX ORDER — ALPRAZOLAM 0.25 MG/1
0.25 TABLET ORAL 2 TIMES DAILY PRN
Status: DISCONTINUED | OUTPATIENT
Start: 2021-09-13 | End: 2021-09-15 | Stop reason: HOSPADM

## 2021-09-13 RX ORDER — ALBUTEROL SULFATE 90 UG/1
2 AEROSOL, METERED RESPIRATORY (INHALATION) EVERY 6 HOURS PRN
Status: DISCONTINUED | OUTPATIENT
Start: 2021-09-13 | End: 2021-09-15 | Stop reason: HOSPADM

## 2021-09-13 RX ORDER — CINACALCET 30 MG/1
30 TABLET, FILM COATED ORAL DAILY
Status: DISCONTINUED | OUTPATIENT
Start: 2021-09-14 | End: 2021-09-15 | Stop reason: HOSPADM

## 2021-09-13 RX ORDER — LEVOTHYROXINE SODIUM 0.05 MG/1
50 TABLET ORAL DAILY
Status: DISCONTINUED | OUTPATIENT
Start: 2021-09-14 | End: 2021-09-15 | Stop reason: HOSPADM

## 2021-09-13 RX ORDER — ATROPINE SULFATE 10 MG/ML
1 SOLUTION/ DROPS OPHTHALMIC
Status: DISCONTINUED | OUTPATIENT
Start: 2021-09-13 | End: 2021-09-13

## 2021-09-13 RX ORDER — ONDANSETRON 2 MG/ML
4 INJECTION INTRAMUSCULAR; INTRAVENOUS ONCE
Status: COMPLETED | OUTPATIENT
Start: 2021-09-13 | End: 2021-09-13

## 2021-09-13 RX ORDER — CHOLECALCIFEROL (VITAMIN D3) 10 MCG
1 TABLET ORAL
Status: DISCONTINUED | OUTPATIENT
Start: 2021-09-14 | End: 2021-09-15 | Stop reason: HOSPADM

## 2021-09-13 RX ORDER — NYSTATIN 100000 [USP'U]/G
POWDER TOPICAL 2 TIMES DAILY
Status: DISCONTINUED | OUTPATIENT
Start: 2021-09-14 | End: 2021-09-15 | Stop reason: HOSPADM

## 2021-09-13 RX ORDER — PREGABALIN 50 MG/1
50 CAPSULE ORAL DAILY
Status: DISCONTINUED | OUTPATIENT
Start: 2021-09-14 | End: 2021-09-15 | Stop reason: HOSPADM

## 2021-09-13 RX ORDER — ACETAMINOPHEN 325 MG/1
650 TABLET ORAL EVERY 8 HOURS PRN
Status: DISCONTINUED | OUTPATIENT
Start: 2021-09-13 | End: 2021-09-15 | Stop reason: HOSPADM

## 2021-09-13 RX ORDER — ONDANSETRON 2 MG/ML
4 INJECTION INTRAMUSCULAR; INTRAVENOUS EVERY 6 HOURS PRN
Status: DISCONTINUED | OUTPATIENT
Start: 2021-09-13 | End: 2021-09-15 | Stop reason: HOSPADM

## 2021-09-13 RX ORDER — HYDROMORPHONE HCL/PF 1 MG/ML
1 SYRINGE (ML) INJECTION ONCE
Status: COMPLETED | OUTPATIENT
Start: 2021-09-13 | End: 2021-09-13

## 2021-09-13 RX ORDER — SEVELAMER HYDROCHLORIDE 800 MG/1
800 TABLET, FILM COATED ORAL
Status: DISCONTINUED | OUTPATIENT
Start: 2021-09-14 | End: 2021-09-15 | Stop reason: HOSPADM

## 2021-09-13 RX ORDER — TORSEMIDE 20 MG/1
100 TABLET ORAL EVERY 12 HOURS
Status: DISCONTINUED | OUTPATIENT
Start: 2021-09-13 | End: 2021-09-15 | Stop reason: HOSPADM

## 2021-09-13 RX ORDER — NIFEDIPINE 30 MG/1
30 TABLET, EXTENDED RELEASE ORAL DAILY
Status: DISCONTINUED | OUTPATIENT
Start: 2021-09-14 | End: 2021-09-14

## 2021-09-13 RX ORDER — PANTOPRAZOLE SODIUM 40 MG/1
40 TABLET, DELAYED RELEASE ORAL
Status: DISCONTINUED | OUTPATIENT
Start: 2021-09-14 | End: 2021-09-15 | Stop reason: HOSPADM

## 2021-09-13 RX ORDER — LIDOCAINE 50 MG/G
1 PATCH TOPICAL DAILY
Status: DISCONTINUED | OUTPATIENT
Start: 2021-09-14 | End: 2021-09-15 | Stop reason: HOSPADM

## 2021-09-13 RX ORDER — CARVEDILOL 25 MG/1
25 TABLET ORAL 2 TIMES DAILY WITH MEALS
Status: DISCONTINUED | OUTPATIENT
Start: 2021-09-14 | End: 2021-09-15 | Stop reason: HOSPADM

## 2021-09-13 RX ORDER — INSULIN GLARGINE 100 [IU]/ML
20 INJECTION, SOLUTION SUBCUTANEOUS
Status: DISCONTINUED | OUTPATIENT
Start: 2021-09-13 | End: 2021-09-15 | Stop reason: HOSPADM

## 2021-09-13 RX ORDER — CIPROFLOXACIN 2 MG/ML
400 INJECTION, SOLUTION INTRAVENOUS EVERY 24 HOURS
Status: COMPLETED | OUTPATIENT
Start: 2021-09-13 | End: 2021-09-14

## 2021-09-13 RX ORDER — LORATADINE 10 MG/1
10 TABLET ORAL DAILY
Status: DISCONTINUED | OUTPATIENT
Start: 2021-09-14 | End: 2021-09-15 | Stop reason: HOSPADM

## 2021-09-13 RX ORDER — BRIMONIDINE TARTRATE 2 MG/ML
1 SOLUTION/ DROPS OPHTHALMIC 2 TIMES DAILY
Status: DISCONTINUED | OUTPATIENT
Start: 2021-09-14 | End: 2021-09-15 | Stop reason: HOSPADM

## 2021-09-13 RX ORDER — ATORVASTATIN CALCIUM 20 MG/1
20 TABLET, FILM COATED ORAL EVERY EVENING
Status: DISCONTINUED | OUTPATIENT
Start: 2021-09-13 | End: 2021-09-15 | Stop reason: HOSPADM

## 2021-09-13 RX ORDER — PREGABALIN 50 MG/1
50 CAPSULE ORAL DAILY
Qty: 30 CAPSULE | Refills: 0 | Status: SHIPPED | OUTPATIENT
Start: 2021-09-13 | End: 2021-11-21

## 2021-09-13 RX ORDER — OXYCODONE HYDROCHLORIDE AND ACETAMINOPHEN 5; 325 MG/1; MG/1
1 TABLET ORAL EVERY 6 HOURS PRN
Status: DISCONTINUED | OUTPATIENT
Start: 2021-09-13 | End: 2021-09-15 | Stop reason: HOSPADM

## 2021-09-13 RX ADMIN — PIPERACILLIN SODIUM, TAZOBACTAM SODIUM 3.38 G: 36; 4.5 INJECTION, POWDER, LYOPHILIZED, FOR SOLUTION INTRAVENOUS at 23:49

## 2021-09-13 RX ADMIN — ONDANSETRON 4 MG: 2 INJECTION INTRAMUSCULAR; INTRAVENOUS at 20:11

## 2021-09-13 RX ADMIN — ATORVASTATIN CALCIUM 20 MG: 20 TABLET, FILM COATED ORAL at 23:49

## 2021-09-13 RX ADMIN — ALBUTEROL SULFATE 2 PUFF: 90 AEROSOL, METERED RESPIRATORY (INHALATION) at 23:49

## 2021-09-13 RX ADMIN — INSULIN GLARGINE 20 UNITS: 100 INJECTION, SOLUTION SUBCUTANEOUS at 23:49

## 2021-09-13 RX ADMIN — HYDROMORPHONE HYDROCHLORIDE 1 MG: 1 INJECTION, SOLUTION INTRAMUSCULAR; INTRAVENOUS; SUBCUTANEOUS at 20:10

## 2021-09-13 NOTE — ED ATTENDING ATTESTATION
9/13/2021  I, Jourdan Hdz MD, saw and evaluated the patient  I have discussed the patient with the resident/non-physician practitioner and agree with the resident's/non-physician practitioner's findings, Plan of Care, and MDM as documented in the resident's/non-physician practitioner's note, except where noted  All available labs and Radiology studies were reviewed  I was present for key portions of any procedure(s) performed by the resident/non-physician practitioner and I was immediately available to provide assistance  At this point I agree with the current assessment done in the Emergency Department    I have conducted an independent evaluation of this patient a history and physical is as follows:  Groin    H/o esrd  On dialysis   Has HTN    pvd  Amputation of left toe   Chronic pain syndrome seems pain management   llq abd pain    Intermittent nausea no fever no chills no diarrhea   Normal  bm       psh  c section oopherectomy  exa nad  Anicteric  Lungs clear heart rrr no m abd soft tender llq    Imp  abd pain  concern for diverticulitis  ED Course     CT scan shows diverticulitis  Patient will be admitted for IV  Critical Care Time  Procedures

## 2021-09-13 NOTE — PATIENT INSTRUCTIONS
Epidural Steroid Injection   AMBULATORY CARE:   What you need to know about an epidural steroid injection (MEAGAN):  An MEAGAN is a procedure to inject steroid medicine into the epidural space  The epidural space is between your spinal cord and vertebrae  Steroids reduce inflammation and fluid buildup in your spine that may be causing pain  You may be given pain medicine along with the steroids  How to prepare for an MEAGAN:  Your healthcare provider will talk to you about how to prepare for your procedure  He or she will tell you what medicines to take or not take on the day of your procedure  You may need to stop taking blood thinners or other medicines several days before your procedure  You may need to adjust any diabetes medicine you take on the day of your procedure  Steroid medicine can increase your blood sugar level  Arrange for someone to drive you home when you are discharged  What will happen during an MEAGAN:   · You will be given medicine to numb the procedure area  You will be awake for the procedure, but you will not feel pain  You may also be given medicine to help you relax  Contrast liquid will be used to help your healthcare provider see the area better  Tell the healthcare provider if you have ever had an allergic reaction to contrast liquid  · Your healthcare provider may place the needle into your neck area, middle of your back, or tailbone area  He may inject the medicine next to the nerves that are causing your pain  He may instead inject the medicine into a larger area of the epidural space  This helps the medicine spread to more nerves  Your healthcare provider will use a fluoroscope to help guide the needle to the right place  A fluoroscope is a type of x-ray  After the procedure, a bandage will be placed over the injection site to prevent infection  What will happen after an MEAGAN:  You will have a bandage over the injection site to prevent infection   Your healthcare provider will tell you when you can bathe and any activity guidelines  You will be able to go home  Risks of an MEAGAN:  You may have temporary or permanent nerve damage or paralysis  You may have bleeding or develop a serious infection, such as meningitis (swelling of the brain coverings)  An abscess may also develop  An abscess is a pus-filled area under the skin  You may need surgery to fix the abscess  You may have a seizure, anxiety, or trouble sleeping  If you are a man, you may have temporary erectile dysfunction (not able to have an erection)  Call your local emergency number (911 in the [address] [place] [address],[address]) if:   · You have a seizure  · You have trouble moving your legs  Seek care immediately if:   · Blood soaks through your bandage  · You have a fever or chills, severe back pain, and the procedure area is sensitive to the touch  · You cannot control when you urinate or have a bowel movement  Call your doctor if:   · You have weakness or numbness in your legs  · Your wound is red, swollen, or draining pus  · You have nausea or are vomiting  · Your face or neck is red and you feel warm  · You have more pain than you had before the procedure  · You have swelling in your hands or feet  · You have questions or concerns about your condition or care  Care for your wound as directed: You may remove the bandage before you go to bed the day of your procedure  You may take a shower, but do not take a bath for at least 24 hours  Self-care:   · Do not drive,  use machines, or do strenuous activity for 24 hours after your procedure or as directed  · Continue other treatments  as directed  Steroid injections alone will not control your pain  The injections are meant to be used with other treatments, such as physical therapy  Follow up with your doctor as directed:  Write down your questions so you remember to ask them during your visits     © Copyright [employer] 2021 Information is for End User's use only and may not be sold, redistributed or otherwise used for commercial purposes  All illustrations and images included in CareNotes® are the copyrighted property of [employer]  or Tereso Andrade  The above information is an  only  It is not intended as medical advice for individual conditions or treatments  Talk to your doctor, nurse or pharmacist before following any medical regimen to see if it is safe and effective for you  Pregabalin (By mouth)   Pregabalin ([id number]-GA-ba-justus)  Treats nerve and muscle pain, including fibromyalgia  Also treats partial-onset seizures  Brand Name(s): Lyrica, Lyrica CR   There may be other brand names for this medicine  When This Medicine Should Not Be Used: This medicine is not right for everyone  Do not use it if you had an allergic reaction to pregabalin  How to Use This Medicine:   Capsule, Liquid, Long Acting Tablet  · Take your medicine as directed  Your dose may need to be changed several times to find what works best for you  · Extended-release tablet: Swallow the extended-release tablet whole  Do not crush, break, or chew it  Take it after an evening meal   · Oral liquid: Measure the oral liquid medicine with a marked measuring spoon, oral syringe, or medicine cup  · This medicine should come with a Medication Guide  Ask your pharmacist for a copy if you do not have one  · Missed dose: Take a dose as soon as you remember  If it is almost time for your next dose, wait until then and take a regular dose  Do not take extra medicine to make up for a missed dose  If you miss a dose of the extended-release tablet after your evening meal, take it before bedtime after a snack  If you miss the dose before bedtime, take it after your morning meal  If you do not take the dose the following morning, then take the next dose at your regular time after your evening meal  Do not take 2 doses at the same time    · Store the medicine in a closed container at room temperature, away from heat, moisture, and direct light  Drugs and Foods to Avoid:   Ask your doctor or pharmacist before using any other medicine, including over-the-counter medicines, vitamins, and herbal products  · Some medicines can affect how pregabalin works  Tell your doctor if you are using any of the following:   ? ACE inhibitor (including benazepril, enalapril, lisinopril, quinapril, ramipril)  ? Oral diabetes medicine (including metformin, pioglitazone, rosiglitazone)  · Do not drink alcohol while you are using this medicine  · Tell your doctor if you use anything else that makes you sleepy  Some examples are allergy medicine, narcotic pain medicine, and alcohol  Tell your doctor if you are also using oxycodone, lorazepam, or zolpidem  Warnings While Using This Medicine:   · Tell your doctor if you are pregnant or breastfeeding, or if you have kidney disease, heart failure, heart rhythm problems, lung or breathing problems, a bleeding disorder, diabetes, sores or skin problems, or a low blood platelet count  Tell your doctor if you have a history of angioedema (severe swelling), alcohol or drug abuse, depression, or other mood problems  · This medicine may cause the following problems:   ? Angioedema (severe swelling), which may be life-threatening  ? Changes in mood or behavior, including suicidal thoughts or behavior  ? Respiratory depression (serious breathing problem that can be life-threatening), when used with narcotic pain medicines  ? Peripheral edema (swelling of your hands, ankles, feet, or lower legs)  ? Increased risk for cancer and bleeding  ? Serious muscle problems  ? Heart rhythm changes  · This medicine may make you dizzy or drowsy  It may also cause blurry or double vision  Do not drive or do anything else that could be dangerous until you know how this medicine affects you  · Do not stop using this medicine suddenly   Your doctor will need to slowly decrease your dose before you stop it completely  · Your doctor will do lab tests at regular visits to check on the effects of this medicine  Keep all appointments  · Keep all medicine out of the reach of children  Never share your medicine with anyone  Possible Side Effects While Using This Medicine:   Call your doctor right away if you notice any of these side effects:  · Allergic reaction: Itching or hives, swelling in your face or hands, swelling or tingling in your mouth or throat, chest tightness, trouble breathing  · Blistering, peeling, red skin rash  · Blue lips, fingernails, or skin, trouble breathing, chest pain  · Blurry or double vision  · Fever, chills, cough, sore throat, body aches  · Muscle pain, tenderness, or weakness, general feeling of illness  · Rapid weight gain, swelling in your hands, ankles, or feet  · Severe dizziness or drowsiness  · Sudden mood changes, unusual moods or behavior, including extreme happiness or depression, thoughts or attempts of killing oneself  · Swelling in your throat, head, or neck  · Uneven heartbeat  · Unusual bleeding, bruising, or weakness  If you notice these less serious side effects, talk with your doctor:   · Confusion, trouble concentrating  · Constipation  · Dry mouth  If you notice other side effects that you think are caused by this medicine, tell your doctor  Call your doctor for medical advice about side effects  You may report side effects to FDA at 8-314-FDA-6888  © Copyright [employer] 2021 Information is for End User's use only and may not be sold, redistributed or otherwise used for commercial purposes  The above information is an  only  It is not intended as medical advice for individual conditions or treatments  Talk to your doctor, nurse or pharmacist before following any medical regimen to see if it is safe and effective for you
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
## 2021-09-14 ENCOUNTER — APPOINTMENT (INPATIENT)
Dept: NON INVASIVE DIAGNOSTICS | Facility: HOSPITAL | Age: 56
DRG: 391 | End: 2021-09-14
Payer: MEDICARE

## 2021-09-14 ENCOUNTER — APPOINTMENT (OUTPATIENT)
Dept: RADIOLOGY | Facility: HOSPITAL | Age: 56
DRG: 391 | End: 2021-09-14
Payer: MEDICARE

## 2021-09-14 ENCOUNTER — APPOINTMENT (OUTPATIENT)
Dept: DIALYSIS | Facility: HOSPITAL | Age: 56
DRG: 391 | End: 2021-09-14
Payer: MEDICARE

## 2021-09-14 PROBLEM — K57.32 SIGMOID DIVERTICULITIS: Status: ACTIVE | Noted: 2021-09-13

## 2021-09-14 LAB
ANION GAP SERPL CALCULATED.3IONS-SCNC: 8 MMOL/L (ref 4–13)
BUN SERPL-MCNC: 86 MG/DL (ref 5–25)
CALCIUM SERPL-MCNC: 8.1 MG/DL (ref 8.3–10.1)
CHLORIDE SERPL-SCNC: 101 MMOL/L (ref 100–108)
CO2 SERPL-SCNC: 24 MMOL/L (ref 21–32)
CREAT SERPL-MCNC: 9.89 MG/DL (ref 0.6–1.3)
ERYTHROCYTE [DISTWIDTH] IN BLOOD BY AUTOMATED COUNT: 13.4 % (ref 11.6–15.1)
GFR SERPL CREATININE-BSD FRML MDRD: 4 ML/MIN/1.73SQ M
GLUCOSE SERPL-MCNC: 126 MG/DL (ref 65–140)
GLUCOSE SERPL-MCNC: 156 MG/DL (ref 65–140)
GLUCOSE SERPL-MCNC: 194 MG/DL (ref 65–140)
GLUCOSE SERPL-MCNC: 235 MG/DL (ref 65–140)
GLUCOSE SERPL-MCNC: 246 MG/DL (ref 65–140)
GLUCOSE SERPL-MCNC: 325 MG/DL (ref 65–140)
HCT VFR BLD AUTO: 24.5 % (ref 34.8–46.1)
HGB BLD-MCNC: 7.9 G/DL (ref 11.5–15.4)
MCH RBC QN AUTO: 31.6 PG (ref 26.8–34.3)
MCHC RBC AUTO-ENTMCNC: 32.2 G/DL (ref 31.4–37.4)
MCV RBC AUTO: 98 FL (ref 82–98)
PLATELET # BLD AUTO: 153 THOUSANDS/UL (ref 149–390)
PMV BLD AUTO: 11 FL (ref 8.9–12.7)
POTASSIUM SERPL-SCNC: 5.2 MMOL/L (ref 3.5–5.3)
RBC # BLD AUTO: 2.5 MILLION/UL (ref 3.81–5.12)
SODIUM SERPL-SCNC: 133 MMOL/L (ref 136–145)
WBC # BLD AUTO: 8 THOUSAND/UL (ref 4.31–10.16)

## 2021-09-14 PROCEDURE — 36415 COLL VENOUS BLD VENIPUNCTURE: CPT | Performed by: HOSPITALIST

## 2021-09-14 PROCEDURE — 99223 1ST HOSP IP/OBS HIGH 75: CPT | Performed by: INTERNAL MEDICINE

## 2021-09-14 PROCEDURE — 93925 LOWER EXTREMITY STUDY: CPT

## 2021-09-14 PROCEDURE — 99222 1ST HOSP IP/OBS MODERATE 55: CPT | Performed by: PODIATRIST

## 2021-09-14 PROCEDURE — 99232 SBSQ HOSP IP/OBS MODERATE 35: CPT | Performed by: PHYSICIAN ASSISTANT

## 2021-09-14 PROCEDURE — 93923 UPR/LXTR ART STDY 3+ LVLS: CPT

## 2021-09-14 PROCEDURE — 5A1D70Z PERFORMANCE OF URINARY FILTRATION, INTERMITTENT, LESS THAN 6 HOURS PER DAY: ICD-10-PCS | Performed by: HOSPITALIST

## 2021-09-14 PROCEDURE — 99222 1ST HOSP IP/OBS MODERATE 55: CPT | Performed by: INTERNAL MEDICINE

## 2021-09-14 PROCEDURE — 82948 REAGENT STRIP/BLOOD GLUCOSE: CPT

## 2021-09-14 PROCEDURE — 80048 BASIC METABOLIC PNL TOTAL CA: CPT | Performed by: HOSPITALIST

## 2021-09-14 PROCEDURE — 90935 HEMODIALYSIS ONE EVALUATION: CPT | Performed by: INTERNAL MEDICINE

## 2021-09-14 PROCEDURE — G0257 UNSCHED DIALYSIS ESRD PT HOS: HCPCS

## 2021-09-14 PROCEDURE — 85027 COMPLETE CBC AUTOMATED: CPT | Performed by: HOSPITALIST

## 2021-09-14 RX ORDER — DICYCLOMINE HYDROCHLORIDE 10 MG/1
10 CAPSULE ORAL
Status: DISCONTINUED | OUTPATIENT
Start: 2021-09-14 | End: 2021-09-15 | Stop reason: HOSPADM

## 2021-09-14 RX ORDER — WARFARIN SODIUM 3 MG/1
9 TABLET ORAL
Status: DISCONTINUED | OUTPATIENT
Start: 2021-09-15 | End: 2021-09-15 | Stop reason: HOSPADM

## 2021-09-14 RX ORDER — NIFEDIPINE 30 MG/1
30 TABLET, EXTENDED RELEASE ORAL DAILY
Status: DISCONTINUED | OUTPATIENT
Start: 2021-09-14 | End: 2021-09-15 | Stop reason: HOSPADM

## 2021-09-14 RX ADMIN — SERTRALINE HYDROCHLORIDE 50 MG: 50 TABLET ORAL at 12:49

## 2021-09-14 RX ADMIN — BRIMONIDINE TARTRATE 1 DROP: 2 SOLUTION OPHTHALMIC at 17:26

## 2021-09-14 RX ADMIN — DICLOFENAC SODIUM 2 G: 10 GEL TOPICAL at 17:26

## 2021-09-14 RX ADMIN — DICYCLOMINE HYDROCHLORIDE 10 MG: 10 CAPSULE ORAL at 17:13

## 2021-09-14 RX ADMIN — TORSEMIDE 100 MG: 20 TABLET ORAL at 22:16

## 2021-09-14 RX ADMIN — CARVEDILOL 25 MG: 25 TABLET, FILM COATED ORAL at 17:13

## 2021-09-14 RX ADMIN — CINACALCET 30 MG: 30 TABLET, FILM COATED ORAL at 14:01

## 2021-09-14 RX ADMIN — CIPROFLOXACIN 400 MG: 2 INJECTION, SOLUTION INTRAVENOUS at 22:20

## 2021-09-14 RX ADMIN — OXYCODONE HYDROCHLORIDE AND ACETAMINOPHEN 1 TABLET: 5; 325 TABLET ORAL at 12:49

## 2021-09-14 RX ADMIN — INSULIN LISPRO 3 UNITS: 100 INJECTION, SOLUTION INTRAVENOUS; SUBCUTANEOUS at 17:24

## 2021-09-14 RX ADMIN — METRONIDAZOLE 500 MG: 500 INJECTION, SOLUTION INTRAVENOUS at 17:13

## 2021-09-14 RX ADMIN — DICYCLOMINE HYDROCHLORIDE 10 MG: 10 CAPSULE ORAL at 22:13

## 2021-09-14 RX ADMIN — DICLOFENAC SODIUM 2 G: 10 GEL TOPICAL at 13:55

## 2021-09-14 RX ADMIN — METRONIDAZOLE 500 MG: 500 INJECTION, SOLUTION INTRAVENOUS at 10:50

## 2021-09-14 RX ADMIN — ACETAMINOPHEN 650 MG: 325 TABLET, FILM COATED ORAL at 08:09

## 2021-09-14 RX ADMIN — LORATADINE 10 MG: 10 TABLET ORAL at 09:34

## 2021-09-14 RX ADMIN — SEVELAMER HYDROCHLORIDE 800 MG: 800 TABLET, FILM COATED PARENTERAL at 17:13

## 2021-09-14 RX ADMIN — ATORVASTATIN CALCIUM 20 MG: 20 TABLET, FILM COATED ORAL at 17:29

## 2021-09-14 RX ADMIN — LEVOTHYROXINE SODIUM 50 MCG: 50 TABLET ORAL at 05:39

## 2021-09-14 RX ADMIN — INSULIN GLARGINE 20 UNITS: 100 INJECTION, SOLUTION SUBCUTANEOUS at 22:10

## 2021-09-14 RX ADMIN — NYSTATIN: 100000 POWDER TOPICAL at 17:25

## 2021-09-14 RX ADMIN — INSULIN LISPRO 1 UNITS: 100 INJECTION, SOLUTION INTRAVENOUS; SUBCUTANEOUS at 07:34

## 2021-09-14 RX ADMIN — BRIMONIDINE TARTRATE 1 DROP: 2 SOLUTION OPHTHALMIC at 13:54

## 2021-09-14 RX ADMIN — OXYCODONE HYDROCHLORIDE AND ACETAMINOPHEN 1 TABLET: 5; 325 TABLET ORAL at 04:42

## 2021-09-14 RX ADMIN — METRONIDAZOLE 500 MG: 500 INJECTION, SOLUTION INTRAVENOUS at 23:54

## 2021-09-14 RX ADMIN — NIFEDIPINE 30 MG: 30 TABLET, FILM COATED, EXTENDED RELEASE ORAL at 13:55

## 2021-09-14 RX ADMIN — TORSEMIDE 100 MG: 20 TABLET ORAL at 12:49

## 2021-09-14 RX ADMIN — SEVELAMER HYDROCHLORIDE 800 MG: 800 TABLET, FILM COATED PARENTERAL at 12:49

## 2021-09-14 RX ADMIN — PREGABALIN 50 MG: 50 CAPSULE ORAL at 12:50

## 2021-09-14 RX ADMIN — CIPROFLOXACIN 400 MG: 2 INJECTION, SOLUTION INTRAVENOUS at 02:19

## 2021-09-14 RX ADMIN — TORSEMIDE 100 MG: 20 TABLET ORAL at 01:32

## 2021-09-14 RX ADMIN — Medication 1 CAPSULE: at 17:13

## 2021-09-14 RX ADMIN — DICLOFENAC SODIUM 2 G: 10 GEL TOPICAL at 22:13

## 2021-09-14 RX ADMIN — PANTOPRAZOLE SODIUM 40 MG: 40 TABLET, DELAYED RELEASE ORAL at 05:39

## 2021-09-14 RX ADMIN — ALPRAZOLAM 0.25 MG: 0.25 TABLET ORAL at 20:39

## 2021-09-14 RX ADMIN — LORATADINE 10 MG: 10 TABLET ORAL at 12:49

## 2021-09-14 RX ADMIN — METRONIDAZOLE 500 MG: 500 INJECTION, SOLUTION INTRAVENOUS at 01:21

## 2021-09-14 RX ADMIN — INSULIN LISPRO 5 UNITS: 100 INJECTION, SOLUTION INTRAVENOUS; SUBCUTANEOUS at 22:10

## 2021-09-14 RX ADMIN — OXYCODONE HYDROCHLORIDE AND ACETAMINOPHEN 1 TABLET: 5; 325 TABLET ORAL at 19:29

## 2021-09-14 RX ADMIN — ALPRAZOLAM 0.25 MG: 0.25 TABLET ORAL at 08:09

## 2021-09-14 NOTE — ASSESSMENT & PLAN NOTE
Patient presented with nausea no pedro warm toes and left lower quadrant abdominal pain  CT of abdomen and pelvis reported "Mild acute sigmoid diverticulitis  No extraluminal free air or abscess formation identified "  Patient  non septic , normal white blood cell count and temperature since admission  Continue Cipro Flagyl, transit to PO Rx prior discharge  Clear liquid diet , will advance diet as tolerated  Continue current pain management, avoid NSAIDs or narcotics    Patient follows with pain specialist  GI consult

## 2021-09-14 NOTE — PROGRESS NOTES
Dialysis note:    I saw and examined patient during hemodialysis treatment at 10:21 AM on 9/14/2021  The patient was receiving hemodialysis for treatment of end stage renal disease  I also reviewed vital signs, intake and output, lab results and recent events, and agree with dialysis order  Tolerating HD  BP significantly variable  Patient is asymptomatic

## 2021-09-14 NOTE — ASSESSMENT & PLAN NOTE
Lab Results   Component Value Date    EGFR 4 09/14/2021    EGFR 4 09/13/2021    EGFR 6 06/28/2021    CREATININE 9 89 (H) 09/14/2021    CREATININE 9 02 (H) 09/13/2021    CREATININE 7 57 (H) 06/28/2021   · Hemodialysis on Tuesday Thursday and Saturday  · Continue with multiple end-stage renal disease meds  · Nephrology consult appreciated

## 2021-09-14 NOTE — CONSULTS
Podiatry - Consultation    Patient Information:   Mariely Christine 49 y o  female MRN: [id number]  Unit/Bed#: -01 Encounter: [id number]  PCP: Michelle Flowers MD  Date of Admission:  9/13/2021  Date of Consultation: 09/14/21  Requesting Physician: Jose A Ramirez MD      ASSESSMENT:    Mariely Christine is a 49 y o  female with:    1  Diabetic wound right foot- [place] 2  2  T2DM  3  ESRD  4  Obesity    PLAN:      · Wound assessed at bedside no acute signs of infection noted at this time  · F/u MRI and LEADs  · Xray reviewed:suspicious for focal chronic osteomyelitis  · Local wound care consisting of maxorb dsd  Wound care instructions placed  · Elevation on green foam wedges or pillows when non-ambulatory  · Rest of care per primary team   · Will discuss this plan with my attending and update as needed  Weightbearing status: as tolerated    SUBJECTIVE:    History of Present Illness:    Mariely Christine is a 49 y o  female who is originally admitted 9/13/2021 due to left lower quadrant pain  Patient has a past medical history significant for T2DM, morbid obesity, ESRD  We are consulted for right foot wound  Pt reports that the wound has been present for approximately 2 month  She states that the wound started as a blister  Pt states that she sees Dr Rei Corbett in wound care  Pt denies any increased in drainage to wound  Pt reports increase in pain to wound that started approximately 1 weeks or so ago  Pt denies nausea, vomiting, chills, chest pain at this time  Review of Systems:    Constitutional: Negative  HENT: Negative  Eyes: Negative  Respiratory: Negative  Cardiovascular: Negative  Gastrointestinal: Negative  Musculoskeletal: right foot pain  Skin:skin wound  Neurological: neuropathy  Psych: Negative       Past Medical and Surgical History:     Past Medical History:   Diagnosis Date    Abnormal uterine bleeding (AUB)     Anxiety     Arthritis     Chronic kidney disease     Claustrophobia     [facility] [facility]     Disease of thyroid gland     DVT (deep venous thrombosis) (HCC)     End stage kidney disease (HCC)     Foot ulcer due to secondary DM ([facility] )     Hemodialysis patient ([facility] [facility] )     Tues, Th, Sat    Hypertension     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[facility] )     right eye    Morbid obesity ([facility]     Osteomyelitis of fifth toe of right foot ([facility] [facility]     Panic attacks     Pulmonary embolism (HCC)     Reflux esophagitis     Thrombophlebitis of saphenous vein     Warfarin anticoagulation        Past Surgical History:   Procedure Laterality Date    AMPUTATION      r 4th toe    ARTERIOVENOUS GRAFT PLACEMENT      CATARACT EXTRACTION Bilateral     CERVICAL BIOPSY  W/ LOOP ELECTRODE EXCISION       SECTION      DIALYSIS FISTULA CREATION      DILATION AND CURETTAGE OF UTERUS      ENDOMETRIAL ABLATION W/ NOVASURE      EYE SURGERY      HYSTERECTOMY      @ age 57 (complete)    IR AV FISTULAGRAM/GRAFTOGRAM  10/11/2019    IR AV FISTULAGRAM/GRAFTOGRAM  2020    IR AV FISTULAGRAM/GRAFTOGRAM  2020    IR NON-TUNNELED CENTRAL LINE PLACEMENT  2021    OOPHORECTOMY Bilateral     @ age 57    PERICARDIUM SURGERY      WY AMPUTATION TOE,MT-P JT Right 2020    Procedure: AMPUTATION TOE- 5th;  Surgeon: Maynor Bundy DPM;  Location: [facility];  Service: Podiatry    WY COLONOSCOPY FLX DX W/COLLJ SPEC WHEN PFRMD N/A 3/13/2019    Procedure: COLONOSCOPY;  Surgeon: Castillo Whitfield MD;  Location: [facility]; Service: Gastroenterology    WY ESOPHAGOGASTRODUODENOSCOPY TRANSORAL DIAGNOSTIC N/A 3/13/2019    Procedure: ESOPHAGOGASTRODUODENOSCOPY (EGD); Surgeon: Castillo Whitfield MD;  [facility]: [facility];   Service: Gastroenterology    WY LAPAROSCOPY W TOT HYSTERECT UTERUS 250 GRAM OR LESS N/A 2016    Procedure: HYSTERECTOMY LAPAROSCOPIC TOTAL [facility]), with bilateral salpingectomy;  Surgeon: Nehal Patel DO;  [facility]: [facility];  Service: Gynecology  THROMBECTOMY / ARTERIOVENOUS GRAFT REVISION      TOE SURGERY Right     removal of the 4th toe       Meds/Allergies:    Medications Prior to Admission   Medication    Accu-Chek Guide test strip    Accu-Chek Softclix Lancets lancets    acetaminophen (TYLENOL) 325 mg tablet    albuterol (ProAir HFA) 90 mcg/act inhaler    ALPRAZolam (XANAX) 0 25 mg tablet    ALPRAZolam (XANAX) 0 5 mg tablet    atorvastatin (LIPITOR) 20 mg tablet    atropine (ISOPTO ATROPINE) 1 % ophthalmic solution    B Complex-C-Folic Acid (Natalia-Denys) TABS    benzonatate (TESSALON PERLES) 100 mg capsule    brimonidine (ALPHAGAN P) 0 15 % ophthalmic solution    CALCIUM CARBONATE PO    carvedilol (COREG) 25 mg tablet    cinacalcet (SENSIPAR) 30 mg tablet    diclofenac sodium (VOLTAREN) 1 %    diphenhydrAMINE (BENADRYL) 25 mg capsule    insulin glargine (LANTUS) 100 units/mL subcutaneous injection    KIONEX 15 GM/60ML suspension    levothyroxine 50 mcg tablet    lidocaine (LIDODERM) 5 %    loratadine (CLARITIN) 10 mg tablet    NIFEdipine (ADALAT CC) 30 MG 24 hr tablet    NOVOLOG FLEXPEN 100 units/mL SOPN    nystatin (MYCOSTATIN) powder    ondansetron (ZOFRAN) 4 mg tablet    pantoprazole (PROTONIX) 40 mg tablet    Podiatric Products (Flexitol Heel Balm) OINT    prednisoLONE acetate (PRED FORTE) 1 % ophthalmic suspension    pregabalin (LYRICA) 50 mg capsule    senna (SENOKOT) 8 6 mg    sertraline (ZOLOFT) 50 mg tablet    sevelamer (RENAGEL) 800 mg tablet    sevelamer carbonate (RENVELA) 800 mg tablet    sodium chloride (OCEAN) 0 65 % nasal spray    torsemide (DEMADEX) 100 mg tablet    traMADol (ULTRAM) 50 mg tablet    warfarin (COUMADIN) 3 mg tablet       Allergies   Allergen Reactions    Iodinated Diagnostic Agents Itching       Social History:     Marital Status: Single    Substance Use History:   Social History     Substance and Sexual Activity   Alcohol Use Never    Alcohol/week: 0 0 standard drinks     Social History     Tobacco Use   Smoking Status Never Smoker   Smokeless Tobacco Never Used     Social History     Substance and Sexual Activity   Drug Use No       Family History:    Family History   Problem Relation Age of Onset    Heart disease Family     Diabetes Family     Heart disease Mother     Cancer Brother         neck    Throat cancer Brother     Ovarian cancer Maternal Aunt 40         OBJECTIVE:    Vitals:   Blood Pressure: 136/53 (09/14/21 1153)  Pulse: 72 (09/14/21 1153)  Temperature: (!) 97 4 °F (36 3 °C) (09/14/21 0800)  Temp Source: Oral (09/14/21 0800)  Respirations: 18 (09/14/21 1153)  Weight - Scale: 124 kg (274 lb) (09/13/21 1710)  SpO2: 94 % (09/14/21 0730)    Physical Exam:    General Appearance: Alert, cooperative, no distress  HEENT: Head normocephalic, atraumatic, without obvious abnormality  Heart: Normal rate and rhythm  Lungs: Non-labored breathing  No respiratory distress  Abdomen: Without distension  Psychiatric: AAOx3  Lower Extremity:  Vascular:   DP and PT pulses are dopplerble b/l  CRT < 3 seconds at the digits  +1/4 edema noted at bilateral lower extremities  Skin temperature is WNL bilaterally  Musculoskeletal:  MMT is 5/5 in all muscle compartments bilaterally  ROM at the 1st MPJ and ankle joint are decreased bilaterally with the leg extended  Pain on palpation of periwound right foot  Dermatological:  Lower extremity wound(s) as noted below:    Wound #: 1  Location: right medial aspect of 1st MTPJ  Length 2cm: Width 2cm: Depth 0 3cm:   Deepest Tissue Noted in Base: subq  Probe to Bone: No  Peripheral Skin Description: Attached  Granulation: 0% Fibrotic Tissue: 100% Necrotic Tissue: 0%   Drainage Amount: minimal, serous  No crepitus, no ascending erythema, no purulence, no fluctuance  Neurological:  Gross sensation is intact  Protective sensation is diminished       Clinical Images 09/14/21:          Additional data:     Lab Results: I have personally reviewed pertinent labs including:    Results from last 7 days   Lab Units 09/14/21  0538 09/13/21 2009   WBC Thousand/uL 8 00 8 76   HEMOGLOBIN g/dL 7 9* 8 6*   HEMATOCRIT % 24 5* 26 4*   PLATELETS Thousands/uL 153 168   NEUTROS PCT %  --  75   LYMPHS PCT %  --  14   MONOS PCT %  --  8   EOS PCT %  --  2     Results from last 7 days   Lab Units 09/14/21  0538 09/13/21  2101   POTASSIUM mmol/L 5 2 5 5*   CHLORIDE mmol/L 101 102   CO2 mmol/L 24 25   BUN mg/dL 86* 79*   CREATININE mg/dL 9 89* 9 02*   CALCIUM mg/dL 8 1* 8 5   ALK PHOS U/L  --  146*   ALT U/L  --  22   AST U/L  --  12     Results from last 7 days   Lab Units 09/13/21  2255   INR  3 66*       Cultures: I have personally reviewed pertinent cultures including:              Imaging: I have personally reviewed pertinent reports in PACS  EKG, Pathology, and Other Studies: I have personally reviewed pertinent reports  ** Please Note: Portions of the record may have been created with voice recognition software  Occasional wrong word or "sound a like" substitutions may have occurred due to the inherent limitations of voice recognition software  Read the chart carefully and recognize, using context, where substitutions have occurred   **

## 2021-09-14 NOTE — ASSESSMENT & PLAN NOTE
Lab Results   Component Value Date    HGBA1C 8 4 (H) 06/11/2021       Recent Labs     09/13/21 2030   POCGLU 167*       Blood Sugar Average: Last 72 hrs:  (P) 167     Continue long-acting insulin and insulin sliding scale

## 2021-09-14 NOTE — H&P
[facility]  H&P- Da Abreu 1965, 49 y o  female MRN: [id number]  Unit/Bed#: ED 13 Encounter: [id number]  Primary Care Provider: Alejo Thapa MD   Date and time admitted to hospital: 9/13/2021  6:32 PM    * Acute left lower quadrant pain  Assessment & Plan  Patient presented with nausea no pedro warm toes and left lower quadrant abdominal pain  CT of abdomen and pelvis reported "Mild acute sigmoid diverticulitis  No extraluminal free air or abscess formation identified "  Patient  non septic , normal white blood cell count and temperature since admission  Continue Cipro Flagyl, transit to PO Rx prior discharge  Clear liquid diet , will advance diet as tolerated  Continue current pain management, avoid NSAIDs or narcotics    Patient follows with pain specialist  GI consult      ESRD on hemodialysis [facility])  Assessment & Plan  Lab Results   Component Value Date    EGFR 4 09/13/2021    EGFR 6 06/28/2021    EGFR 6 06/27/2021    CREATININE 9 02 (H) 09/13/2021    CREATININE 7 57 (H) 06/28/2021    CREATININE 6 80 (H) 06/27/2021   Hemodialysis on Tuesday Thursday and Saturday  Continue multiple end-stage renal disease meds  Nephrology consult    Diabetes mellitus with neurological manifestation [facility])  Assessment & Plan  Lab Results   Component Value Date    HGBA1C 8 4 (H) 06/11/2021       Recent Labs     09/13/21  2030   POCGLU 167*       Blood Sugar Average: Last 72 hrs:  (P) 167     Continue long-acting insulin and insulin sliding scale    Acquired hypothyroidism  Assessment & Plan  On levothyroxine    Essential hypertension  Assessment & Plan  Continue home blood pressure meds with holding parameters    History of DVT (deep vein thrombosis)/PE  Assessment & Plan  Will check INR  Continue warfarin pending INR level    Lumbar radiculopathy  Assessment & Plan  Patient follows with pain specialist for chronic lumbar pain and orthopedic service for bilateral knee pain    Morbid obesity ([facility] [address] )  Assessment & Plan  Low-calorie diet when acceptable    Anemia  Assessment & Plan  Anemia secondary to end-stage renal disease  Management as per Nephrology    Right foot ulcer, with fat layer exposed ([facility] [address] )  Assessment & Plan  Status post recent debridement by Podiatry  Continue to follow-up with podiatrist  X-ray of right foot ordered      VTE Pharmacologic Prophylaxis: VTE Score: 7 Moderate Risk (Score 3-4) - Pharmacological DVT Prophylaxis Ordered: warfarin (Coumadin)  Code Status: Level 1 - Full Code       Anticipated Length of Stay: Patient will be admitted on an observation basis with an anticipated length of stay of less than 2 midnights secondary to GI consult short course of IV antibiotics  Total Time for Visit, including Counseling / Coordination of Care: 45 minutes Greater than 50% of this total time spent on direct patient counseling and coordination of care  Chief Complaint:  Left lower quadrant pain    History of Present Illness:  Victor Manuel Casanova is a 49 y o  female with a PMH of ESRD on hemodialysis Tuesday Thursday Saturday, insulin-dependent diabetes with chronic right foot ulcer, anemia of chronic renal disease, morbid obesity who presented to the emergency room with acute onset of nonradiating left lower crampy abdominal pain associated with nausea, patient denies fever, chills, vomiting, diarrhea  CT of abdomen and pelvis impression "Mild acute sigmoid diverticulitis  No extraluminal free air or abscess formation identified "  Patient afebrile, WBC 8 76   BUN 79 ,creatinine 9 ,patient reports being compliant with dialysis regimen  She was ordered broad-spectrum antibiotic in the ER and admitted to the hospital service on observation basis  Should the patient stay more than 2 midnights we will convert her to full inpatient admission  Review of Systems:  Review of Systems   Gastrointestinal: Positive for abdominal pain         Past Medical and Surgical History:   Past Medical History:   Diagnosis Date    Abnormal uterine bleeding (AUB)     Anxiety     Arthritis     Chronic kidney disease     Claustrophobia     Diabetes mellitus ([facility] [address] )     Disease of thyroid gland     DVT (deep venous thrombosis) (HCC)     End stage kidney disease (HCC)     Foot ulcer due to secondary DM ([facility] [address] )     Hemodialysis patient ([facility] [address] )     es, Thurs, Sat    Hypertension     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[facility] [address] )     right eye    [facility]     Osteomyelitis of fifth toe of right foot ([facility]     Panic attacks     Pulmonary embolism (HCC)     Reflux esophagitis     Thrombophlebitis of saphenous vein     Warfarin anticoagulation        Past Surgical History:   Procedure Laterality Date    AMPUTATION      r 4th toe    ARTERIOVENOUS GRAFT PLACEMENT      CATARACT EXTRACTION Bilateral     CERVICAL BIOPSY  W/ LOOP ELECTRODE EXCISION       SECTION      DIALYSIS FISTULA CREATION      DILATION AND CURETTAGE OF UTERUS      ENDOMETRIAL ABLATION W/ NOVASURE      EYE SURGERY      HYSTERECTOMY      @ age 57 (complete)    IR AV FISTULAGRAM/GRAFTOGRAM  10/11/2019    IR AV FISTULAGRAM/GRAFTOGRAM  2020    IR AV FISTULAGRAM/GRAFTOGRAM  2020    IR NON-TUNNELED CENTRAL LINE PLACEMENT  2021    OOPHORECTOMY Bilateral     @ age 57    PERICARDIUM SURGERY      HI AMPUTATION TOE,MT-P JT Right 2020    Procedure: AMPUTATION TOE- 5th;  Surgeon: Gigi Strickland DPM;  Location: [facility];  Service: Podiatry    HI COLONOSCOPY FLX DX W/COLLJ SPEC WHEN PFRMD N/A 3/13/2019    Procedure: COLONOSCOPY;  Surgeon: Debbie Phelan MD;  Location: [facility]; Service: Gastroenterology    HI ESOPHAGOGASTRODUODENOSCOPY TRANSORAL DIAGNOSTIC N/A 3/13/2019    Procedure: ESOPHAGOGASTRODUODENOSCOPY (EGD); Surgeon: Debbie Phelan MD;  Location: [facility];   Service: Gastroenterology    HI LAPAROSCOPY W TOT HYSTERECT UTERUS 250 GRAM OR LESS N/A 2016    Procedure: Wilton Baptiste LAPAROSCOPIC TOTAL ([address]), with bilateral salpingectomy;  Surgeon: Alessia Bae DO;  [facility]: [facility];  Service: Gynecology    THROMBECTOMY / ARTERIOVENOUS GRAFT REVISION      TOE SURGERY Right     removal of the 4th toe       Meds/Allergies:  Prior to Admission medications    Medication Sig Start Date End Date Taking? Authorizing Provider   YoungCheluis a Guide test strip use to TEST BLOOD SUGAR two to three times a day 7/5/21   Historical Provider, MD   Accu-Chek Softclix Lancets lancets 3 (three) times a day Use to test blood sugar 12/30/20   Historical Provider, MD   acetaminophen (TYLENOL) 325 mg tablet Take 2 tablets (650 mg total) by mouth every 6 (six) hours as needed for mild pain or fever 2/20/20   Jennifer Crow MD   albuterol (ProAir HFA) 90 mcg/act inhaler Inhale 2 puffs every 6 (six) hours as needed for wheezing or shortness of breath 7/3/21   Ana María Bettencourt DO   ALPRAZolam Kandee Kohli) 0 25 mg tablet Take 0 25 mg by mouth 2 (two) times a day as needed for anxiety    Historical Provider, MD   ALPRAZolam Kandee Kohli) 0 5 mg tablet take 1 tablet by mouth at bedtime and 2 ADDITIONAL TABLETS 3 TIMES WEEKLY ON DIALYSIS DAYS 7/10/21   Historical Provider, MD   atorvastatin (LIPITOR) 20 mg tablet Take 20 mg by mouth every evening  4/24/18   Historical Provider, MD   atropine (ISOPTO ATROPINE) 1 % ophthalmic solution Administer 1 drop to both eyes daily at bedtime  2/4/19   Historical Provider, MD   B Complex-C-Folic Acid (Natalia-Denys) TABS Take 1 tablet by mouth daily 7/6/21   Historical Provider, MD   benzonatate (TESSALON PERLES) 100 mg capsule Take 1 capsule (100 mg total) by mouth 3 (three) times a day as needed for cough 6/11/21   LOIS Stanley   brimonidine (ALPHAGAN P) 0 15 % ophthalmic solution Administer 1 drop to both eyes 2 (two) times a day     Historical Provider, MD   CALCIUM CARBONATE PO Take 1,000 mg by mouth daily      Historical Provider, MD   carvedilol (COREG) 25 mg tablet Take 25 mg by mouth 2 (two) times a day with meals      Historical Provider, MD   cinacalcet (SENSIPAR) 30 mg tablet Take 30 mg by mouth daily    Historical Provider, MD   diclofenac sodium (VOLTAREN) 1 % Apply 2 g topically 4 (four) times a day 5/19/19   Brad Quinones MD   diphenhydrAMINE (BENADRYL) 25 mg capsule Take by mouth as needed for itching      Historical Provider, MD   insulin glargine (LANTUS) 100 units/mL subcutaneous injection Inject 20 Units under the skin daily at bedtime 6/11/21   [facility] GM/60ML suspension Take by mouth Takes as a shake on dialysis days Tues-Thurs_Sat 12/10/18   Historical Provider, MD   levothyroxine 50 mcg tablet Take 50 mcg by mouth daily    Historical Provider, MD   lidocaine (LIDODERM) 5 % APPLY 1 PATCH BY TRANDERMAL ROUTE EVERY DAY (MAY WEAR UP TO 12 HOURS)   9/15/20   Historical Provider, MD   loratadine (CLARITIN) 10 mg tablet Take 10 mg by mouth daily    Historical Provider, MD   NIFEdipine (ADALAT CC) 30 MG 24 hr tablet Take 1 tablet (30 mg total) by mouth daily 6/27/21   Rosalind Dillon MD   NOVOLOG FLEXPEN 100 units/mL SOPN INJECT 1 TO 5 UNITS SUBCUTANEOUSLY THREE TIMES A DAY BEFORE MELAS AS PER SLIDING SCALE 4/30/20   Historical Provider, MD   nystatin (MYCOSTATIN) powder Apply topically 2 (two) times a day 8/31/19   Jorgito Gordon MD   ondansetron [facility]) 4 mg tablet Take 1 tablet (4 mg total) by mouth every 8 (eight) hours as needed for nausea or vomiting 1/22/19   Michael Alves MD   pantoprazole (PROTONIX) 40 mg tablet Take 1 tablet (40 mg total) by mouth daily in the early morning 2/21/20   Atul Brooke, [address] [place] [address]) OINT Apply topically once for 1 dose 3/31/21 3/31/21  Meliton Kwans, DPM   prednisoLONE acetate (PRED FORTE) 1 % ophthalmic suspension Administer 1 drop to both eyes 4 (four) times a day Patient takes only occasionally    Historical Provider, MD   pregabalin (LYRICA) 50 mg capsule Take 1 capsule (50 mg total) by mouth daily 9/13/21   LOIS Quan   senna (SENOKOT) 8 6 mg Take 2 tablets (17 2 mg total) by mouth daily at bedtime as needed for constipation 2/21/20   LOIS Flowers   sertraline (ZOLOFT) 50 mg tablet Take 1 tablet (50 mg total) by mouth daily 4/28/19   LOIS Okeefe   sevelamer (RENAGEL) 800 mg tablet Take 1 tablet (800 mg total) by mouth 3 (three) times a day with meals 6/11/21   Chandrakant Night, LOIS   sevelamer carbonate (RENVELA) 800 mg tablet TAKE 3 TABLETS 3 TIMES A DAY WITH MEALS AND 1 TABLET TWICE A DAY WITH SNACKS 6/22/21   Historical Provider, MD   sodium chloride (OCEAN) 0 65 % nasal spray 1 spray into each nostril 3 (three) times a day as needed for congestion 6/11/21 Eldon Night, LOIS   torsemide [facility]) 100 mg tablet Take 100 mg by mouth every 12 (twelve) hours  1/2/19   Historical Provider, MD   traMADol (ULTRAM) 50 mg tablet PLEASE SEE ATTACHED FOR DETAILED DIRECTIONS 7/12/21   Historical Provider, MD   warfarin (COUMADIN) 3 mg tablet Take 3 tablets (9 mg total) by mouth daily Takes 9 mg Monday Sat 6/11/21   Plummer NightLOIS   gabapentin (NEURONTIN) 100 mg capsule Take 3 capsules (300 mg total) by mouth daily at bedtime 2/21/20 9/13/21  LOIS Flowers     I have reviewed home medications using recent Epic encounter  Allergies:    Allergies   Allergen Reactions    Iodinated Diagnostic Agents Itching       Social History:  Marital Status: Single   Occupation: none  Patient Pre-hospital Living Situation: Home  Patient Pre-hospital Level of Mobility: walks  Patient Pre-hospital Diet Restrictions: reg  Substance Use History:   Social History     Substance and Sexual Activity   Alcohol Use Never    Alcohol/week: 0 0 standard drinks     Social History     Tobacco Use   Smoking Status Never Smoker   Smokeless Tobacco Never Used     Social History     Substance and Sexual Activity   Drug Use No       Family History:  Family History Problem Relation Age of Onset    Heart disease Family     Diabetes Family     Heart disease Mother     Cancer Brother         neck    Throat cancer Brother     Ovarian cancer Maternal Aunt 40       Physical Exam:     Vitals:   Blood Pressure: 165/68 (09/13/21 2123)  Pulse: 71 (09/13/21 2123)  Temperature: 97 9 °F (36 6 °C) (09/13/21 1710)  Temp Source: Tympanic (09/13/21 1710)  Respirations: 18 (09/13/21 2123)  Weight - Scale: 124 kg (274 lb) (09/13/21 1710)  SpO2: 99 % (09/13/21 2123)    Physical Exam  Constitutional:       Appearance: She is obese  HENT:      Head: Normocephalic  Mouth/Throat:      Mouth: Mucous membranes are moist    Eyes:      Pupils: Pupils are equal, round, and reactive to light  Cardiovascular:      Rate and Rhythm: Normal rate  Pulmonary:      Effort: Pulmonary effort is normal    Abdominal:      General: Bowel sounds are normal  There is distension  Tenderness: There is abdominal tenderness  There is no guarding  Musculoskeletal:         General: No tenderness or deformity  Skin:     Comments: Packet right foot wound   Neurological:      General: No focal deficit present     Psychiatric:         Mood and Affect: Mood normal           Additional Data:     Lab Results:  Results from last 7 days   Lab Units 09/13/21 2009   WBC Thousand/uL 8 76   HEMOGLOBIN g/dL 8 6*   HEMATOCRIT % 26 4*   PLATELETS Thousands/uL 168   NEUTROS PCT % 75   LYMPHS PCT % 14   MONOS PCT % 8   EOS PCT % 2     Results from last 7 days   Lab Units 09/13/21  2101   SODIUM mmol/L 134*   POTASSIUM mmol/L 5 5*   CHLORIDE mmol/L 102   CO2 mmol/L 25   BUN mg/dL 79*   CREATININE mg/dL 9 02*   ANION GAP mmol/L 7   CALCIUM mg/dL 8 5   ALBUMIN g/dL 2 6*   TOTAL BILIRUBIN mg/dL 0 39   ALK PHOS U/L 146*   ALT U/L 22   AST U/L 12   GLUCOSE RANDOM mg/dL 166*         Results from last 7 days   Lab Units 09/13/21  2030   POC GLUCOSE mg/dl 167*               Imaging: Reviewed radiology reports from this admission including: abdominal/pelvic CT  CT abdomen pelvis wo contrast   Final Result by Armond Schulz MD (09/13 2115)      Mild acute sigmoid diverticulitis  No extraluminal free air or abscess formation identified  Remainder of the findings, as described above  The study was marked in [facility] for immediate notification  Workstation performed: [id number]         XR foot 3+ views RIGHT    (Results Pending)       ** Please Note: This note has been constructed using a voice recognition system   **

## 2021-09-14 NOTE — CONSULTS
Consultation - Nephrology   Ronnie Calderón 49 y o  female MRN: [id number]  Unit/Bed#: [facility] Encounter: [id number]    ASSESSMENT AND PLAN:  Patient is 68-year-old female with significant medical issues of ESRD on HD, hypertension, history of PE, diabetes presented with lower quadrant left-sided abdominal pain  We are consulted for dialysis management  ESRD on HD on TTS schedule at [address]  -HD today  Outpatient dry weight 122 7 kg  Pre HD weight 129 2 kg today  -UF removal up to 3 5 L as BP tolerated  CKD anemia, not on Epogen due to history of PE  Transfuse p r n  For hemoglobin less than seven   -hemoglobin lower, continue to monitor    CKD BMD, continue to monitor phosphorus, PTH  Mild hyperkalemia, two K bath on dialysis today   -strict low-potassium diet recommended  Mild hyponatremia, corrected serum sodium around 134  Continue to monitor on dialysis  Fluid restriction 1 5 L per day recommended  Hypertension  -blood pressure overall significantly fluctuating while on dialysis, patient is asymptomatic  ? Accurate given wrist BP cuff  -on carvedilol 25 mg b i d  , nifedipine XL 30 mg daily  Left lower quadrant pain, CT scan shows mild acute sigmoid diverticulitis, management as per primary team, GI  Suspected focal chronic osteomyelitis along medial aspect of right 1st distal phalanx with soft tissue wound, further management as per primary team     Discussed above plan in detail with primary team    HISTORY OF PRESENT ILLNESS:  Requesting Physician: Loyda Lozano MD  Reason for Consult:  ESRD    Ronnie Calderón is a 49y o  year old female who was admitted to [place] [address] after presenting with left lower quadrant abdominal pain  A renal consultation is requested today for assistance in the management of ESRD  Medical records including prior lab values were reviewed from [facility]  Outpatient dialysis unit records were reviewed    Patient presented for significant left lower quadrant pain issues  She remains on dialysis on TTS schedule  She denies any lightheadedness, dizziness, chest pain or shortness of breath  Denies any vomiting at the time of my encounter  PAST MEDICAL HISTORY:  Past Medical History:   Diagnosis Date    Abnormal uterine bleeding (AUB)     Anxiety     Arthritis     Chronic kidney disease     Claustrophobia     Diabetes mellitus ([facility] [address] [facility]     Disease of thyroid gland     DVT (deep venous thrombosis) (HCC)     End stage kidney disease (HCC)     Foot ulcer due to secondary DM ([facility] [address] [facility]     [facility]     Tues, Thurs, Sat    Hypertension     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[facility] [address] [facility]     right eye    Morbid obesity ([facility]     Osteomyelitis of fifth toe of right foot ([facility]     Panic attacks     Pulmonary embolism (HCC)     Reflux esophagitis     Thrombophlebitis of saphenous vein     Warfarin anticoagulation        PAST SURGICAL HISTORY:  Past Surgical History:   Procedure Laterality Date    AMPUTATION      r 4th toe    ARTERIOVENOUS GRAFT PLACEMENT      CATARACT EXTRACTION Bilateral     CERVICAL BIOPSY  W/ LOOP ELECTRODE EXCISION       SECTION      DIALYSIS FISTULA CREATION      DILATION AND CURETTAGE OF UTERUS      ENDOMETRIAL ABLATION W/ NOVASURE      EYE SURGERY      HYSTERECTOMY      @ age 57 (complete)    IR AV FISTULAGRAM/GRAFTOGRAM  10/11/2019    IR AV FISTULAGRAM/GRAFTOGRAM  2020    IR AV FISTULAGRAM/GRAFTOGRAM  2020    IR NON-TUNNELED CENTRAL LINE PLACEMENT  2021    OOPHORECTOMY Bilateral     @ age 57    PERICARDIUM SURGERY      NV AMPUTATION TOE,MT-P JT Right 2020    Procedure: AMPUTATION TOE- 5th;  Surgeon: Jones Medellin DPM;  Location: [facility];  Service: Podiatry    NV COLONOSCOPY FLX DX W/COLLJ SPEC WHEN PFRMD N/A 3/13/2019    Procedure: COLONOSCOPY;  Surgeon: Anita Balderas MD;  Location: [facility];   Service: Gastroenterology    NV ESOPHAGOGASTRODUODENOSCOPY TRANSORAL DIAGNOSTIC N/A 3/13/2019    Procedure: ESOPHAGOGASTRODUODENOSCOPY (EGD); Surgeon: Amanda Cortes MD;  Location: [facility];   Service: Gastroenterology    NE LAPAROSCOPY W TOT HYSTERECT UTERUS 250 GRAM OR LESS N/A 2/16/2016    Procedure: HYSTERECTOMY LAPAROSCOPIC TOTAL ([address]), with bilateral salpingectomy;  Surgeon: Phoebe Alvarez DO;  [facility];  Service: Gynecology    THROMBECTOMY / ARTERIOVENOUS GRAFT REVISION      TOE SURGERY Right     removal of the 4th toe       ALLERGIES:  Allergies   Allergen Reactions    Iodinated Diagnostic Agents Itching       SOCIAL HISTORY:  Social History     Substance and Sexual Activity   Alcohol Use Never    Alcohol/week: 0 0 standard drinks     Social History     Substance and Sexual Activity   Drug Use No     Social History     Tobacco Use   Smoking Status Never Smoker   Smokeless Tobacco Never Used       FAMILY HISTORY:  Family History   Problem Relation Age of Onset    Heart disease Family     Diabetes Family     Heart disease Mother     Cancer Brother         neck    Throat cancer Brother     Ovarian cancer Maternal Aunt 40       MEDICATIONS:    Current Facility-Administered Medications:     acetaminophen (TYLENOL) tablet 650 mg, 650 mg, Oral, Q8H PRN, Alexis Cain MD, 650 mg at 09/14/21 0809    albuterol (PROVENTIL HFA,VENTOLIN HFA) inhaler 2 puff, 2 puff, Inhalation, Q6H PRN, Alexis Cain MD, 2 puff at 09/13/21 2349    ALPRAZolam (XANAX) tablet 0 25 mg, 0 25 mg, Oral, BID PRN, Alexis Cain MD, 0 25 mg at 09/14/21 0809    atorvastatin (LIPITOR) tablet 20 mg, 20 mg, Oral, QPM, Alexis Cain MD, 20 mg at 09/13/21 2349    b complex-vitamin C-folic acid (NEPHROCAPS) capsule 1 capsule, 1 capsule, Oral, Daily With Dinner, Alexis Cain MD    brimonidine tartrate 0 2 % ophthalmic solution 1 drop, 1 drop, Both Eyes, BID, Alexis Cain MD    carvedilol (COREG) tablet 25 mg, 25 mg, Oral, BID With Meals, MD Rebekah Toscano cinacalcet (SENSIPAR) tablet 30 mg, 30 mg, Oral, Daily, Aminah Sanchez MD    ciprofloxacin (CIPRO) IVPB (premix in 5% dextrose) 400 mg 200 mL, 400 mg, Intravenous, Q24H, Aminah Sanchez MD, Stopped at 09/14/21 0319    Diclofenac Sodium (VOLTAREN) 1 % topical gel 2 g, 2 g, Topical, 4x Daily, Aminah Sanchez MD    insulin glargine (LANTUS) subcutaneous injection 20 Units 0 2 mL, 20 Units, Subcutaneous, HS, Aminah Sanchez MD, 20 Units at 09/13/21 2349    insulin lispro (HumaLOG) 100 units/mL subcutaneous injection 1-6 Units, 1-6 Units, Subcutaneous, TID AC, 1 Units at 09/14/21 0734 **AND** Fingerstick Glucose (POCT), , , TID AC, Aminah Sanchez MD    insulin lispro (HumaLOG) 100 units/mL subcutaneous injection 1-6 Units, 1-6 Units, Subcutaneous, HS, Aminah Sanchez MD    levothyroxine tablet 50 mcg, 50 mcg, Oral, Daily, Aminah Sanchez MD, 50 mcg at 09/14/21 0539    lidocaine (LIDODERM) 5 % patch 1 patch, 1 patch, Topical, Daily, Aminah Sanchez MD    loratadine (CLARITIN) tablet 10 mg, 10 mg, Oral, Daily, Aminah Sanchez MD, 10 mg at 09/14/21 0934    metroNIDAZOLE (FLAGYL) IVPB (premix) 500 mg 100 mL, 500 mg, Intravenous, Q8H, Aminah Sanchez MD, Stopped at 09/14/21 0151    NIFEdipine (PROCARDIA XL) 24 hr tablet 30 mg, 30 mg, Oral, Daily, Aminah Sanchez MD    nystatin (MYCOSTATIN) powder, , Topical, BID, Aminah Sanchez MD    ondansetron [facility]) injection 4 mg, 4 mg, Intravenous, Q6H PRN, Aminah Sanchez MD    oxyCODONE-acetaminophen (PERCOCET) 5-325 mg per tablet 1 tablet, 1 tablet, Oral, Q6H PRN, Aminah Sanchez MD, 1 tablet at 09/14/21 0442    pantoprazole (PROTONIX) EC tablet 40 mg, 40 mg, Oral, Early Morning, Aminah Sanchez MD, 40 mg at 09/14/21 0539    pregabalin (LYRICA) capsule 50 mg, 50 mg, Oral, Daily, Aminah Sanchez MD    sertraline (ZOLOFT) tablet 50 mg, 50 mg, Oral, Daily, Aminah Sanchez MD    sevelamer (RENAGEL) tablet 800 mg, 800 mg, Oral, TID With Meals, Shikha Basurto MD    torsemide [facility]) tablet 100 mg, 100 mg, Oral, Q12H, Shikha Basurto MD, 100 mg at 09/14/21 0132    [START ON 9/15/2021] warfarin (COUMADIN) tablet 9 mg, 9 mg, Oral, Daily (warfarin), Melita Beckwith PA-C    Current Outpatient Medications:     Accu-Chek Guide test strip, use to TEST BLOOD SUGAR two to three times a day, Disp: , Rfl:     Accu-Chek Softclix Lancets lancets, 3 (three) times a day Use to test blood sugar, Disp: , Rfl:     acetaminophen (TYLENOL) 325 mg tablet, Take 2 tablets (650 mg total) by mouth every 6 (six) hours as needed for mild pain or fever, Disp: 30 tablet, Rfl: 0    albuterol (ProAir HFA) 90 mcg/act inhaler, Inhale 2 puffs every 6 (six) hours as needed for wheezing or shortness of breath, Disp: 8 5 g, Rfl: 0    ALPRAZolam (XANAX) 0 25 mg tablet, Take 0 25 mg by mouth 2 (two) times a day as needed for anxiety, Disp: , Rfl:     ALPRAZolam (XANAX) 0 5 mg tablet, take 1 tablet by mouth at bedtime and 2 ADDITIONAL TABLETS 3 TIMES WEEKLY ON DIALYSIS DAYS, Disp: , Rfl:     atorvastatin (LIPITOR) 20 mg tablet, Take 20 mg by mouth every evening , Disp: , Rfl: 0    atropine (ISOPTO ATROPINE) 1 % ophthalmic solution, Administer 1 drop to both eyes daily at bedtime , Disp: , Rfl:     B Complex-C-Folic Acid (Natalia-Denys) TABS, Take 1 tablet by mouth daily, Disp: , Rfl:     benzonatate (TESSALON PERLES) 100 mg capsule, Take 1 capsule (100 mg total) by mouth 3 (three) times a day as needed for cough, Disp: 20 capsule, Rfl: 0    brimonidine (ALPHAGAN P) 0 15 % ophthalmic solution, Administer 1 drop to both eyes 2 (two) times a day , Disp: , Rfl:     CALCIUM CARBONATE PO, Take 1,000 mg by mouth daily  , Disp: , Rfl:     carvedilol (COREG) 25 mg tablet, Take 25 mg by mouth 2 (two) times a day with meals  , Disp: , Rfl:     cinacalcet (SENSIPAR) 30 mg tablet, Take 30 mg by mouth daily, Disp: , Rfl:     diclofenac sodium (VOLTAREN) 1 %, Apply 2 g topically 4 (four) times a day, Disp: 200 g, Rfl: 0    diphenhydrAMINE (BENADRYL) 25 mg capsule, Take by mouth as needed for itching  , Disp: , Rfl:     insulin glargine (LANTUS) 100 units/mL subcutaneous injection, Inject 20 Units under the skin daily at bedtime, Disp: 10 mL, Rfl: 0    KIONEX 15 GM/60ML suspension, Take by mouth Takes as a shake on dialysis days Tues-Thurs_Sat, Disp: , Rfl: 0    levothyroxine 50 mcg tablet, Take 50 mcg by mouth daily, Disp: , Rfl:     lidocaine (LIDODERM) 5 %, APPLY 1 PATCH BY TRANDERMAL ROUTE EVERY DAY (MAY WEAR UP TO 12 HOURS)  , Disp: , Rfl:     loratadine (CLARITIN) 10 mg tablet, Take 10 mg by mouth daily, Disp: , Rfl:     NIFEdipine (ADALAT CC) 30 MG 24 hr tablet, Take 1 tablet (30 mg total) by mouth daily, Disp: 30 tablet, Rfl: 0    NOVOLOG FLEXPEN 100 units/mL SOPN, INJECT 1 TO 5 UNITS SUBCUTANEOUSLY THREE TIMES A DAY BEFORE MELAS AS PER SLIDING SCALE, Disp: , Rfl:     nystatin (MYCOSTATIN) powder, Apply topically 2 (two) times a day, Disp: 15 g, Rfl: 0    ondansetron (ZOFRAN) 4 mg tablet, Take 1 tablet (4 mg total) by mouth every 8 (eight) hours as needed for nausea or vomiting, Disp: 12 tablet, Rfl: 0    pantoprazole (PROTONIX) 40 mg tablet, Take 1 tablet (40 mg total) by mouth daily in the early morning, Disp: , Rfl: 0    Podiatric Products (Flexitol Heel Balm) OINT, Apply topically once for 1 dose, Disp: 112 g, Rfl: 2    prednisoLONE acetate (PRED FORTE) 1 % ophthalmic suspension, Administer 1 drop to both eyes 4 (four) times a day Patient takes only occasionally, Disp: , Rfl:     pregabalin (LYRICA) 50 mg capsule, Take 1 capsule (50 mg total) by mouth daily, Disp: 30 capsule, Rfl: 0    senna (SENOKOT) 8 6 mg, Take 2 tablets (17 2 mg total) by mouth daily at bedtime as needed for constipation, Disp: 30 each, Rfl: 0    sertraline (ZOLOFT) 50 mg tablet, Take 1 tablet (50 mg total) by mouth daily, Disp: 30 tablet, Rfl: 0    sevelamer (RENAGEL) 800 mg tablet, Take 1 tablet (800 mg total) by mouth 3 (three) times a day with meals, Disp: 30 tablet, Rfl: 0    sevelamer carbonate (RENVELA) 800 mg tablet, TAKE 3 TABLETS 3 TIMES A DAY WITH MEALS AND 1 TABLET TWICE A DAY WITH SNACKS, Disp: , Rfl:     sodium chloride (OCEAN) 0 65 % nasal spray, 1 spray into each nostril 3 (three) times a day as needed for congestion, Disp: 30 mL, Rfl: 0    torsemide (DEMADEX) 100 mg tablet, Take 100 mg by mouth every 12 (twelve) hours , Disp: , Rfl: 0    traMADol (ULTRAM) 50 mg tablet, PLEASE SEE ATTACHED FOR DETAILED DIRECTIONS, Disp: , Rfl:     warfarin (COUMADIN) 3 mg tablet, Take 3 tablets (9 mg total) by mouth daily Takes 9 mg Monday Sat, Disp: 10 tablet, Rfl: 0    REVIEW OF SYSTEMS:  Complete 10 point review of systems were obtained and discussed in length with the patient  Complete review of systems were negative / unremarkable except mentioned in the HPI section       PHYSICAL EXAM:  Current Weight: Weight - Scale: 124 kg (274 lb)  First Weight: Weight - Scale: 124 kg (274 lb)  Vitals:    09/14/21 1000   BP: (!) 81/64   Pulse: 67   Resp: 18   Temp:    SpO2:        Intake/Output Summary (Last 24 hours) at 9/14/2021 1010  Last data filed at 9/14/2021 0800  Gross per 24 hour   Intake 400 ml   Output --   Net 400 ml     Wt Readings from Last 3 Encounters:   09/13/21 124 kg (274 lb)   09/08/21 128 kg (283 lb)   08/25/21 126 kg (277 lb)     Temp Readings from Last 3 Encounters:   09/14/21 (!) 97 4 °F (36 3 °C) (Oral)   07/03/21 98 1 °F (36 7 °C) (Oral)   06/26/21 98 1 °F (36 7 °C) (Oral)     BP Readings from Last 3 Encounters:   09/14/21 (!) 81/64   09/13/21 153/77   09/09/21 91/57     Pulse Readings from Last 3 Encounters:   09/14/21 67   09/13/21 73   09/09/21 89        Physical Examination:  General:  Sitting in chair, no acute distress  Eyes:  Mild conjunctival pallor present  ENT:  External examination of ears and nose unremarkable  Neck:  No obvious lymphadenopathy appreciated  Respiratory: Bilateral air entry present, decreased breath sound at bases  CVS:  S1, S2 present  GI:  Soft, mild tenderness on left lower quadrant  CNS:  Active alert oriented x3  Extremities:  No significant edema in legs  Psych:  Conscious, coherent, oriented  Skin:  No new rash in legs    Invasive Devices:      Lab Results:   Results from last 7 days   Lab Units 09/14/21  0538 09/13/21 2101 09/13/21 2009   WBC Thousand/uL 8 00  --  8 76   HEMOGLOBIN g/dL 7 9*  --  8 6*   HEMATOCRIT % 24 5*  --  26 4*   PLATELETS Thousands/uL 153  --  168   POTASSIUM mmol/L 5 2 5 5*  --    CHLORIDE mmol/L 101 102  --    CO2 mmol/L 24 25  --    BUN mg/dL 86* 79*  --    CREATININE mg/dL 9 89* 9 02*  --    CALCIUM mg/dL 8 1* 8 5  --        Other Studies:   XR foot 3+ views RIGHT   Final Result by Shama Calzada MD (09/14 6902)      Findings suspicious for focal chronic osteomyelitis along medial aspect of right 1st distal phalanx adjacent to a soft tissue wound  Unchanged linear metallic density in the soft tissues of right 3rd toe plantar aspect  Workstation performed: [id number]         CT abdomen pelvis wo contrast   Final Result by Akanksha Conklin MD (09/13 2115)      Mild acute sigmoid diverticulitis  No extraluminal free air or abscess formation identified  Remainder of the findings, as described above  The study was marked in [facility] for immediate notification  Workstation performed: [id number]             Portions of the record may have been created with voice recognition software  Occasional wrong word or "sound a like" substitutions may have occurred due to the inherent limitations of voice recognition software  Read the chart carefully and recognize, using context, where substitutions have occurred

## 2021-09-14 NOTE — ASSESSMENT & PLAN NOTE
· Status post recent debridement by Podiatry  · X-ray of right foot: Findings suspicious for focal chronic osteomyelitis along medial aspect of right 1st distal phalanx adjacent to a soft tissue wound  Unchanged linear metallic density in the soft tissues of right 3rd toe plantar aspect    · Appreciate podiatry consult and recommendations   · MRI right foot ordered and pending   · Follow-up [facility]

## 2021-09-14 NOTE — PLAN OF CARE
Post-Dialysis RN Treatment Note    Blood Pressure:  Pre 116/78 mm/Hg  Post 136/53 mmHg   EDW  122 7 kg    Weight:  Pre 129 2 kg   Post 126 9 kg   Mode of weight measurement: Standing Scale   Volume Removed  2876 ml    Treatment duration 240 minutes    NS given  No    Treatment shortened? Yes, describe: cut by 42mins, pt requesting to end tx, Dr Rosie Sorenson aware   Medications given during Rx Tylenol 650mg, Xanax 0 25mg, Flagyl 500mg   Estimated Kt/V  1 06   Access type: AV fistula   Access Status: Yes, describe:      Report called to primary nurse   Yes     Receiving tx as ordered  UF goal 2500cc as tolerated  Serum K 5 2, 2K bath in use     Problem: METABOLIC, FLUID AND ELECTROLYTES - ADULT  Goal: Electrolytes maintained within normal limits  Description: INTERVENTIONS:  - Monitor labs and assess patient for signs and symptoms of electrolyte imbalances  - Administer electrolyte replacement as ordered  - Monitor response to electrolyte replacements, including repeat lab results as appropriate  - Instruct patient on fluid and nutrition as appropriate  Outcome: Progressing  Goal: Fluid balance maintained  Description: INTERVENTIONS:  - Monitor labs   - Monitor I/O and WT  - Instruct patient on fluid and nutrition as appropriate  - Assess for signs & symptoms of volume excess or deficit  Outcome: Progressing

## 2021-09-14 NOTE — ASSESSMENT & PLAN NOTE
· Patient follows with pain specialist for chronic lumbar pain and orthopedic service for bilateral knee pain

## 2021-09-14 NOTE — ASSESSMENT & PLAN NOTE
Status post recent debridement by Podiatry  Continue to follow-up with podiatrist  X-ray of right foot ordered

## 2021-09-14 NOTE — PROGRESS NOTES
[address] [facility]  Progress Note Dearl Brittany 1965, 49 y o  female MRN: [id number]  Unit/Bed#: -01 Encounter: [id number]  Primary Care Provider: Kael Baker MD   Date and time admitted to hospital: 9/13/2021  6:32 PM    * Sigmoid diverticulitis  Assessment & Plan  · Patient presented with progressive LLQ abdominal pain  · CT of abdomen and pelvis: "Mild acute sigmoid diverticulitis  No extraluminal free air or abscess formation identified "  · No sirs/sepsis criteria preset on admissoin   · Continue IV Cipro and Flagyl for now and transition to PO antibiotics at discharge   · Currently tolerating clear liquid diet, advance to low fiber/low residue  · Continue current pain management, avoid NSAIDs and limit narcotics  · Add scheduled Bentyl 10 mg QID   · GI consult appreciated, will need outpatient colonoscopy     Right foot ulcer, with fat layer exposed ([address] )  Assessment & Plan  · Status post recent debridement by Podiatry  · X-ray of right foot: Findings suspicious for focal chronic osteomyelitis along medial aspect of right 1st distal phalanx adjacent to a soft tissue wound  Unchanged linear metallic density in the soft tissues of right 3rd toe plantar aspect    · Appreciate podiatry consult and recommendations   · MRI right foot ordered and pending   · Follow-up LEADs    Type 2 diabetes mellitus with neurologic complication, with long-term current use of insulin [facility])  Assessment & Plan  Lab Results   Component Value Date    HGBA1C 8 4 (H) 06/11/2021       Recent Labs     09/13/21  2030 09/14/21  0427 09/14/21  0732 09/14/21  1256   POCGLU 167* 246* 156* 126       Blood Sugar Average: Last 72 hrs:  (P) 173 75     · Continue home insulin regimen: Lantus 20 units qhs   · QID accuchekcs with SSI coverage   · Diabetic diet when able to tolerate   · Hypoglycemia protocol   · Continue long-acting insulin and insulin sliding scale    Anemia  Assessment & Plan  · Anemia secondary to end-stage renal disease  · Hemoglobin remains stable within baseline   · Management as per Nephrology    ESRD on hemodialysis [facility])  Assessment & Plan  Lab Results   Component Value Date    EGFR 4 09/14/2021    EGFR 4 09/13/2021    EGFR 6 06/28/2021    CREATININE 9 89 (H) 09/14/2021    CREATININE 9 02 (H) 09/13/2021    CREATININE 7 57 (H) 06/28/2021   · Hemodialysis on Tuesday Thursday and Saturday  · Continue with multiple end-stage renal disease meds  · Nephrology consult appreciated     Essential hypertension  Assessment & Plan  · BP acceptable, monitor routinely   · Continue home blood pressure meds with holding parameters    History of DVT (deep vein thrombosis)/PE  Assessment & Plan  · INR 3 66 today   · Hold coumadin tonight   · Trend PT/INR    Acquired hypothyroidism  Assessment & Plan  On levothyroxine    Morbid obesity ([address] )  Assessment & Plan  · Low-calorie diet when acceptable    VTE Pharmacologic Prophylaxis: VTE Score: 7 High Risk (Score >/= 5) - Pharmacological DVT Prophylaxis Contraindicated  Sequential Compression Devices Ordered  Patient Centered Rounds: I performed bedside rounds with nursing staff today  Discussions with Specialists or Other Care Team Provider: primary RN, podiatry, case management     Education and Discussions with Family / Patient: Updated  () at bedside  Time Spent for Care: 20 minutes  More than 50% of total time spent on counseling and coordination of care as described above  Current Length of Stay: 0 day(s)  Current Patient Status: Inpatient   Certification Statement: The patient will continue to require additional inpatient hospital stay due to pending podiatry clearance  Discharge Plan: Anticipate discharge in 48-72 hrs to home  Code Status: Level 1 - Full Code    Subjective:   Patient continues to have LLQ abdominal pain, reports only slight relief after taking percocet   Discussed that narcotics not the best way to treat abdominal pain and adding bentyl to see if any more relief, patient is agreeable  She denies any nausea, vomiting, diarrhea  She is eager to advance her diet  She expressed concerns and became tearful about potentially losing her foot  Objective:     Vitals:   Temp (24hrs), Av 7 °F (36 5 °C), Min:97 4 °F (36 3 °C), Max:97 9 °F (36 6 °C)    Temp:  [97 4 °F (36 3 °C)-97 9 °F (36 6 °C)] 97 4 °F (36 3 °C)  HR:  [51-86] 72  Resp:  [16-18] 18  BP: ()/(40-92) 180/80  SpO2:  [94 %-99 %] 94 %  Body mass index is 44 22 kg/m²  Input and Output Summary (last 24 hours): Intake/Output Summary (Last 24 hours) at 2021 1414  Last data filed at 2021 1128  Gross per 24 hour   Intake 1000 ml   Output 2876 ml   Net -1876 ml       Physical Exam:   Physical Exam  Vitals and nursing note reviewed  Constitutional:       General: She is not in acute distress  Appearance: She is morbidly obese  Cardiovascular:      Rate and Rhythm: Normal rate and regular rhythm  Pulses: Normal pulses  Heart sounds: No murmur heard  Pulmonary:      Effort: Pulmonary effort is normal  No respiratory distress  Breath sounds: No wheezing or rales  Abdominal:      General: Abdomen is flat  There is no distension  Palpations: Abdomen is soft  Tenderness: There is abdominal tenderness (LLQ)  Musculoskeletal:      Right lower leg: No edema  Left lower leg: No edema  Skin:     Comments: R foot dressing c/d/i   Neurological:      General: No focal deficit present  Mental Status: She is alert and oriented to person, place, and time  Mental status is at baseline            Additional Data:     Labs:  Results from last 7 days   Lab Units 21  0538 21   WBC Thousand/uL 8 00 8 76   HEMOGLOBIN g/dL 7 9* 8 6*   HEMATOCRIT % 24 5* 26 4*   PLATELETS Thousands/uL 153 168   NEUTROS PCT %  --  75   LYMPHS PCT %  --  14   MONOS PCT %  --  8   EOS PCT %  --  2     Results from last 7 days   Lab Units 09/14/21  0538 09/13/21  2101   SODIUM mmol/L 133* 134*   POTASSIUM mmol/L 5 2 5 5*   CHLORIDE mmol/L 101 102   CO2 mmol/L 24 25   BUN mg/dL 86* 79*   CREATININE mg/dL 9 89* 9 02*   ANION GAP mmol/L 8 7   CALCIUM mg/dL 8 1* 8 5   ALBUMIN g/dL  --  2 6*   TOTAL BILIRUBIN mg/dL  --  0 39   ALK PHOS U/L  --  146*   ALT U/L  --  22   AST U/L  --  12   GLUCOSE RANDOM mg/dL 194* 166*     Results from last 7 days   Lab Units 09/13/21  2255   INR  3 66*     Results from last 7 days   Lab Units 09/14/21  1256 09/14/21  0732 09/14/21  0427 09/13/21  2030   POC GLUCOSE mg/dl 126 156* 246* 167*               Lines/Drains:  Invasive Devices     Peripheral Intravenous Line            Peripheral IV 09/13/21 Right Wrist <1 day          Line            Hemodialysis AV Fistula 02/20/18 Left Forearm 1301 days                      Imaging: Reviewed radiology reports from this admission including: abdominal/pelvic CT and xray(s)    Recent Cultures (last 7 days):         Last 24 Hours Medication List:   Current Facility-Administered Medications   Medication Dose Route Frequency Provider Last Rate    acetaminophen  650 mg Oral Q8H PRN Shant Lynch MD      albuterol  2 puff Inhalation Q6H PRN Shant Lynch MD      ALPRAZolam  0 25 mg Oral BID PRN Shant Lynch MD      atorvastatin  20 mg Oral QPM Shant Lynch MD      b complex-vitamin C-folic acid  1 capsule Oral Daily With Anthony Hunt MD      brimonidine tartrate  1 drop Both Eyes BID Shant Lynch MD      carvedilol  25 mg Oral BID With Meals Shant Lynch MD      cinacalcet  30 mg Oral Daily Shant Lynch MD      ciprofloxacin  400 mg Intravenous Q24H Shant Lynch MD Stopped (09/14/21 0319)    Diclofenac Sodium  2 g Topical 4x Daily Shant Lynch MD      dicyclomine  10 mg Oral 4x Daily (AC & HS) Melita Beckwith PA-C      insulin glargine  20 Units Subcutaneous HS Shant Lynch MD      insulin lispro 1-6 Units Subcutaneous TID AC Ana Lorenzo MD      insulin lispro  1-6 Units Subcutaneous HS Ana Lorenzo MD      levothyroxine  50 mcg Oral Daily Ana Lorenzo MD      lidocaine  1 patch Topical Daily Ana Lorenzo MD      loratadine  10 mg Oral Daily Ana Lorenzo MD      metroNIDAZOLE  500 mg Intravenous Q8H Ana Lorenzo  mg (09/14/21 1050)    NIFEdipine ER  30 mg Oral Daily Fabiana Fabian MD      nystatin   Topical BID Ana Lorenzo MD      ondansetron  4 mg Intravenous Q6H PRN Ana Lorenzo MD      oxyCODONE-acetaminophen  1 tablet Oral Q6H PRN Ana Lorenzo MD      pantoprazole  40 mg Oral Early Morning Ana Lorenzo MD      pregabalin  50 mg Oral Daily Ana Lorenzo MD      sertraline  50 mg Oral Daily Ana Lorenzo MD      sevelamer  800 mg Oral TID With Meals Ana Lorenzo MD      torsemide  100 mg Oral Q12H Ana Lorenzo MD      [START ON 9/15/2021] warfarin  9 mg Oral Daily (warfarin) Chin Beckwith PA-C          Today, Patient Was Seen By: Micki Salmon PA-C    **Please Note: This note may have been constructed using a voice recognition system  **

## 2021-09-14 NOTE — CONSULTS
Consult Service: Gastroenterology      PATIENT INFORMATION      Da Abreu 49 y o  female MRN: [id number]  Unit/Bed#: ED 13 Encounter: [id number]  PCP: Alejo Thapa MD  Date of Admission:  9/13/2021  Date of Consultation: 09/14/21  Requesting Physician: Juan Dickens MD       ASSESSMENTS & PLAN   Da Abreu is a 49 y o  old female with PMH of ESRD, HTN, Diabetes, Obesity, Anemia of chronic disease, DVT on coumadin who presented with left lower quadrant pain secondary to acute uncomplicated sigmoid diverticulitis      Gastroenterology has been consulted for assistance with management of acute uncomplicated sigmoid diverticulitis    Uncomplicated sigmoid diverticulitis  · As seen on CT scan  · Currently without any complications or signs of sepsis  · Tolerating clears well, can advance  · Last colonoscopy- 2019- removal of polyps, repeat due in 2022  · Can be discharged on amoxicillin-clavulanate to complete a course of 7-10 days total, patient wants to stay overnight before going home   · Will need colonoscopy as an outpatient in 6-8 weeks     GERD  · Without evidence of Zapien's on EGD  · Protonix once a day    DVT  · On coumadin    Anemia of chronic disease  · Secondary to ESRD  · No overt bleeding    Gastroenterology will sign off, please call with questions  HISTORY OF PRESENT ILLNESS      Da Abreu is a 49 y o  female who is originally admitted for LLQ pain and is being consulted for the same  She has been having LLQ that started Friday and got worse  She describes it as 4/10 when it started and then worsened to a 6-7/10  Denies any diarrhea, nausea or vomiting  Does have heart burn chronically and reports is worse  Otherwise denies any gi symptoms    Last colonoscopy was in 2019 when polyps were removed  Last EGD at the same time without evidence of Zapien's       REVIEW OF SYSTEMS     A thorough 12-point review of systems has been conducted   Pertinent positives and negatives are mentioned in the history of present illness  PAST MEDICAL & SURGICAL HISTORY      Past Medical History:   Diagnosis Date    Abnormal uterine bleeding (AUB)     Anxiety     Arthritis     Chronic kidney disease     Claustrophobia     Diabetes mellitus ([facility] )     Disease of thyroid gland     DVT (deep venous thrombosis) (HCC)     End stage kidney disease (HCC)     Foot ulcer due to secondary DM ([facility] )     Hemodialysis patient ([place] [id number] )     Tues, Thurs, Sat    Hypertension     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[facility] )     right eye    Morbid obesity ([facility] )     Osteomyelitis of fifth toe of right foot (HCC)     Panic attacks     Pulmonary embolism (HCC)     Reflux esophagitis     Thrombophlebitis of saphenous vein     Warfarin anticoagulation        Past Surgical History:   Procedure Laterality Date    AMPUTATION      r 4th toe    ARTERIOVENOUS GRAFT PLACEMENT      CATARACT EXTRACTION Bilateral     CERVICAL BIOPSY  W/ LOOP ELECTRODE EXCISION       SECTION      DIALYSIS FISTULA CREATION      DILATION AND CURETTAGE OF UTERUS      ENDOMETRIAL ABLATION W/ NOVASURE      EYE SURGERY      HYSTERECTOMY      @ age 57 (complete)    IR AV FISTULAGRAM/GRAFTOGRAM  10/11/2019    IR AV FISTULAGRAM/GRAFTOGRAM  2020    IR AV FISTULAGRAM/GRAFTOGRAM  2020    IR NON-TUNNELED CENTRAL LINE PLACEMENT  2021    OOPHORECTOMY Bilateral     @ age 55    PERICARDIUM SURGERY      MN AMPUTATION TOE,MT-P JT Right 2020    Procedure: AMPUTATION TOE- 5th;  Surgeon: Benji Marks DPM;  Location: [facility];  Service: Podiatry    MN COLONOSCOPY FLX DX W/COLLJ SPEC WHEN PFRMD N/A 3/13/2019    Procedure: COLONOSCOPY;  Surgeon: Mirella Olson MD;  Location: [facility]; Service: Gastroenterology    MN ESOPHAGOGASTRODUODENOSCOPY TRANSORAL DIAGNOSTIC N/A 3/13/2019    Procedure: ESOPHAGOGASTRODUODENOSCOPY (EGD); Surgeon: Mirella Olson MD;  [facility]: [facility];   Service: Gastroenterology   Radha Sterling [place] LAPAROSCOPY W TOT HYSTERECT UTERUS 250 GRAM OR LESS N/A 2/16/2016    Procedure: HYSTERECTOMY LAPAROSCOPIC TOTAL ([address]), with bilateral salpingectomy;  Surgeon: Charly Abernathy DO;  Location: [facility];  Service: Gynecology    THROMBECTOMY / ARTERIOVENOUS GRAFT REVISION      TOE SURGERY Right     removal of the 4th toe         MEDICATIONS & ALLERGIES       Medications:   Prior to Admission medications    Medication Sig Start Date End Date Taking?  Authorizing Provider   Accu-Chek Guide test strip use to TEST BLOOD SUGAR two to three times a day 7/5/21   Historical Provider, MD   Accu-Chek Softclix Lancets lancets 3 (three) times a day Use to test blood sugar 12/30/20   Historical Provider, MD   acetaminophen (TYLENOL) 325 mg tablet Take 2 tablets (650 mg total) by mouth every 6 (six) hours as needed for mild pain or fever 2/20/20   Zay Hensley MD   albuterol (ProAir HFA) 90 mcg/act inhaler Inhale 2 puffs every 6 (six) hours as needed for wheezing or shortness of breath 7/3/21   Blanche Santos DO   ALPRAZolam Ynes Ann) 0 25 mg tablet Take 0 25 mg by mouth 2 (two) times a day as needed for anxiety    Historical Provider, MD Seven Ann) 0 5 mg tablet take 1 tablet by mouth at bedtime and 2 ADDITIONAL TABLETS 3 TIMES WEEKLY ON DIALYSIS DAYS 7/10/21   Historical Provider, MD   atorvastatin (LIPITOR) 20 mg tablet Take 20 mg by mouth every evening  4/24/18   Historical Provider, MD   atropine (ISOPTO ATROPINE) 1 % ophthalmic solution Administer 1 drop to both eyes daily at bedtime  2/4/19   Historical Provider, MD   B Complex-C-Folic Acid (Natalia-Denys) TABS Take 1 tablet by mouth daily 7/6/21   Historical Provider, MD   benzonatate (TESSALON PERLES) 100 mg capsule Take 1 capsule (100 mg total) by mouth 3 (three) times a day as needed for cough 6/11/21   LOIS Caballero   brimonidine (ALPHAGAN P) 0 15 % ophthalmic solution Administer 1 drop to both eyes 2 (two) times a day     Historical Provider, MD CALCIUM CARBONATE PO Take 1,000 mg by mouth daily  Historical Provider, MD   carvedilol (COREG) 25 mg tablet Take 25 mg by mouth 2 (two) times a day with meals      Historical Provider, MD   cinacalcet (SENSIPAR) 30 mg tablet Take 30 mg by mouth daily    Historical Provider, MD   diclofenac sodium (VOLTAREN) 1 % Apply 2 g topically 4 (four) times a day 5/19/19   Kaitlyn Ordoñez MD   diphenhydrAMINE (BENADRYL) 25 mg capsule Take by mouth as needed for itching      Historical Provider, MD   insulin glargine (LANTUS) 100 units/mL subcutaneous injection Inject 20 Units under the skin daily at bedtime 6/11/21   Loree Krabbe, [facility] 15 GM/60ML suspension Take by mouth Takes as a shake on dialysis days Tues-Thurs_Sat 12/10/18   Historical Provider, MD   levothyroxine 50 mcg tablet Take 50 mcg by mouth daily    Historical Provider, MD   lidocaine (LIDODERM) 5 % APPLY 1 PATCH BY TRANDERMAL ROUTE EVERY DAY (MAY WEAR UP TO 12 HOURS)   9/15/20   Historical Provider, MD   loratadine (CLARITIN) 10 mg tablet Take 10 mg by mouth daily    Historical Provider, MD   NIFEdipine (ADALAT CC) 30 MG 24 hr tablet Take 1 tablet (30 mg total) by mouth daily 6/27/21   Radha Cohen MD   NOVOLOG FLEXPEN 100 units/mL SOPN INJECT 1 TO 5 UNITS SUBCUTANEOUSLY THREE TIMES A DAY BEFORE MELAS AS PER SLIDING SCALE 4/30/20   Historical Provider, MD   nystatin (MYCOSTATIN) powder Apply topically 2 (two) times a day 8/31/19   Barb Guerra MD   ondansetron [facility]) 4 mg tablet Take 1 tablet (4 mg total) by mouth every 8 (eight) hours as needed for nausea or vomiting 1/22/19   Ashvin Wong MD   pantoprazole (PROTONIX) 40 mg tablet Take 1 tablet (40 mg total) by mouth daily in the early morning 2/21/20   Nessa Bryson, [facility]) OINT Apply topically once for 1 dose 3/31/21 3/31/21  Gigi Strickland DPM   prednisoLONE acetate (PRED FORTE) 1 % ophthalmic suspension Administer 1 drop to both eyes 4 (four) times a day Patient takes only occasionally    Historical Provider, MD   pregabalin (LYRICA) 50 mg capsule Take 1 capsule (50 mg total) by mouth daily 9/13/21   LOIS Sanchez   senna (SENOKOT) 8 6 mg Take 2 tablets (17 2 mg total) by mouth daily at bedtime as needed for constipation 2/21/20   LOIS Naranjo   sertraline (ZOLOFT) 50 mg tablet Take 1 tablet (50 mg total) by mouth daily 4/28/19   LOIS Okeefe   sevelamer (RENAGEL) 800 mg tablet Take 1 tablet (800 mg total) by mouth 3 (three) times a day with meals 6/11/21   Buckner Duane, CRNP   sevelamer carbonate (RENVELA) 800 mg tablet TAKE 3 TABLETS 3 TIMES A DAY WITH MEALS AND 1 TABLET TWICE A DAY WITH SNACKS 6/22/21   Historical Provider, MD   sodium chloride (OCEAN) 0 65 % nasal spray 1 spray into each nostril 3 (three) times a day as needed for congestion 6/11/21   Buckner Duane, CRNP   torsemide [facility]) 100 mg tablet Take 100 mg by mouth every 12 (twelve) hours  1/2/19   Historical Provider, MD   traMADol (ULTRAM) 50 mg tablet PLEASE SEE ATTACHED FOR DETAILED DIRECTIONS 7/12/21   Historical Provider, MD   warfarin (COUMADIN) 3 mg tablet Take 3 tablets (9 mg total) by mouth daily Takes 9 mg Monday Sat 6/11/21   Buckner Duane, CRNP       Allergies:    Allergies   Allergen Reactions    Iodinated Diagnostic Agents Itching         SOCIAL HISTORY      Marital Status: Single    Substance Use History:   Social History     Substance and Sexual Activity   Alcohol Use Never    Alcohol/week: 0 0 standard drinks     Social History     Tobacco Use   Smoking Status Never Smoker   Smokeless Tobacco Never Used     Social History     Substance and Sexual Activity   Drug Use No         FAMILY HISTORY      Non-Contributory      PHYSICAL EXAM     Vitals:   Blood Pressure: 142/62 (09/14/21 0730)  Pulse: 67 (09/14/21 0730)  Temperature: 97 9 °F (36 6 °C) (09/13/21 1710)  Temp Source: Tympanic (09/13/21 1710)  Respirations: 16 (09/14/21 0730)  Weight - Scale: 124 kg (274 lb) (09/13/21 1710)  SpO2: 94 % (09/14/21 0730)    Physical Exam:   GENERAL: NAD  HEENT:  NC/AT, MMM  CARDIAC:  RRR, +S1/S2, no S3/S4 heard  PULMONARY:  CTA B/L, no wheezing/rales/rhonci, non-labored breathing  ABDOMEN:  Soft, NT/ND, +BS, no rebound/guarding/rigidity  DIGITAL RECTAL EXAM: not indicated   NEUROLOGIC:  Alert/oriented x3  SKIN:  No rashes or erythema       ADDITIONAL DATA     Lab Results:     Results from last 7 days   Lab Units 09/14/21  0538 09/13/21 2009   WBC Thousand/uL 8 00 8 76   HEMOGLOBIN g/dL 7 9* 8 6*   HEMATOCRIT % 24 5* 26 4*   PLATELETS Thousands/uL 153 168   NEUTROS PCT %  --  75   LYMPHS PCT %  --  14   MONOS PCT %  --  8   EOS PCT %  --  2     Results from last 7 days   Lab Units 09/14/21  0538 09/13/21  2101   POTASSIUM mmol/L 5 2 5 5*   CHLORIDE mmol/L 101 102   CO2 mmol/L 24 25   BUN mg/dL 86* 79*   CREATININE mg/dL 9 89* 9 02*   CALCIUM mg/dL 8 1* 8 5   ALK PHOS U/L  --  146*   ALT U/L  --  22   AST U/L  --  12     Results from last 7 days   Lab Units 09/13/21  2255   INR  3 66*       Imaging:    CT abdomen pelvis wo contrast    Result Date: 9/13/2021  Narrative: CT ABDOMEN AND PELVIS WITHOUT IV CONTRAST INDICATION:   LLQ abdominal pain  COMPARISON:  4/26/2019 TECHNIQUE:  CT examination of the abdomen and pelvis was performed without intravenous contrast   Axial, sagittal, and coronal 2D reformatted images were created from the source data and submitted for interpretation  Radiation dose length product (DLP) for this visit:  1389 08 mGy-cm   This examination, like all CT scans performed in the [facility], was performed utilizing techniques to minimize radiation dose exposure, including the use of iterative reconstruction and automated exposure control  Enteric contrast was administered  FINDINGS: ABDOMEN LOWER CHEST:  There is cardiomegaly  LIVER/BILIARY TREE:  Unremarkable  GALLBLADDER:  No calcified gallstones   No pericholecystic inflammatory change  SPLEEN:  Unremarkable  PANCREAS:  Unremarkable  ADRENAL GLANDS:  Unremarkable  KIDNEYS/URETERS:  Cortical renal atrophy bilaterally with bilateral renal cysts  Nonobstructive nephrolithiasis and renal vascular calcifications noted  STOMACH AND BOWEL:  There is colonic diverticulosis  There is mild stranding adjacent to the sigmoid colon consistent with mild diverticulitis  There is no extraluminal free air or adjacent abscess  APPENDIX:  No findings to suggest appendicitis  ABDOMINOPELVIC CAVITY:  No ascites  No pneumoperitoneum  No lymphadenopathy  VESSELS:  Atherosclerotic changes are present  No evidence of aneurysm  PELVIS REPRODUCTIVE ORGANS:  Unremarkable for patient's age  URINARY BLADDER:  Unremarkable  ABDOMINAL WALL/INGUINAL REGIONS:  Fat-containing ventral abdominal wall hernia  OSSEOUS STRUCTURES:  There are degenerative changes of the spine  Evidence of renal osteodystrophy     Impression: Mild acute sigmoid diverticulitis  No extraluminal free air or abscess formation identified  Remainder of the findings, as described above  The study was marked in [place] for immediate notification  Workstation performed: [id number]       EKG, Pathology, and Other Studies Reviewed on Admission:   · EKG: Reviewed      Counseling / Coordination of Care Time: 30 total mins spent n consult  Greater than 50% of total time spent on patient counseling and coordination of care  Bronwyn Abdullahi MD   PGY-4,   Gastroenterology         ** Please Note: This note is constructed using a voice recognition dictation system   **

## 2021-09-14 NOTE — QUICK NOTE
Attempted to see the pt and assess wound  However, pt was on Hemodialysis  Podiatry will attempt to see pt again after dialysis  Pt is Afebrile, no leukocytosis

## 2021-09-14 NOTE — ASSESSMENT & PLAN NOTE
· Anemia secondary to end-stage renal disease  · Hemoglobin remains stable within baseline   · Management as per Nephrology

## 2021-09-14 NOTE — ASSESSMENT & PLAN NOTE
Patient follows with pain specialist for chronic lumbar pain and orthopedic service for bilateral knee pain

## 2021-09-14 NOTE — ASSESSMENT & PLAN NOTE
· Patient presented with progressive LLQ abdominal pain  · CT of abdomen and pelvis: "Mild acute sigmoid diverticulitis    No extraluminal free air or abscess formation identified "  · No sirs/sepsis criteria preset on admissoin   · Continue IV Cipro and Flagyl for now and transition to PO antibiotics at discharge   · Currently tolerating clear liquid diet, advance to low fiber/low residue  · Continue current pain management, avoid NSAIDs and limit narcotics  · Add scheduled Bentyl 10 mg QID   · GI consult appreciated, will need outpatient colonoscopy

## 2021-09-14 NOTE — ASSESSMENT & PLAN NOTE
Lab Results   Component Value Date    HGBA1C 8 4 (H) 06/11/2021       Recent Labs     09/13/21  2030 09/14/21  0427 09/14/21  0732 09/14/21  1256   POCGLU 167* 246* 156* 126       Blood Sugar Average: Last 72 hrs:  (P) 173 75     · Continue home insulin regimen: Lantus 20 units qhs   · QID accuchekcs with SSI coverage   · Diabetic diet when able to tolerate   · Hypoglycemia protocol   · Continue long-acting insulin and insulin sliding scale

## 2021-09-14 NOTE — ED PROVIDER NOTES
History  Chief Complaint   Patient presents with    Groin Pain     new left groin pain x3 days, right foot pain with chronic wounds    Foot Pain     81-year-old female with history of end-stage renal disease on dialysis, diabetes with chronic diabetic foot ulcer and chronic pain presents to the emergency department for evaluation left lower quadrant abdominal pain  Patient reports the pain started approximately 3 days ago  At 1st the pain was intermittent but over the past day it has become more constant worsening intensity  She describes it as a sharp nonradiating pain in her left lower quadrant  She reports having intermittent episodes of nausea and vomiting but states that she often has the symptoms  She reports that she has not taken any medication to treat her symptoms  Her last bowel movement was yesterday and it was normal for her  She denies fevers, chills, diarrhea, recent travel and sick contacts  The patient also states that she has continued pain in her right foot around the area of her chronic diabetic foot ulcer  She reports no new drainage or discharge  States no new discoloration in her foot  Prior to Admission Medications   Prescriptions Last Dose Informant Patient Reported? Taking? ALPRAZolam (XANAX) 0 25 mg tablet  Self Yes No   Sig: Take 0 25 mg by mouth 2 (two) times a day as needed for anxiety   ALPRAZolam (XANAX) 0 5 mg tablet   Yes No   Sig: take 1 tablet by mouth at bedtime and 2 ADDITIONAL TABLETS 3 TIMES WEEKLY ON DIALYSIS DAYS   Accu-Chek Guide test strip   Yes No   Sig: use to TEST BLOOD SUGAR two to three times a day   Accu-Chek Softclix Lancets lancets  Self Yes No   Sig: 3 (three) times a day Use to test blood sugar   B Complex-C-Folic Acid (Natalia-Denys) TABS   Yes No   Sig: Take 1 tablet by mouth daily   CALCIUM CARBONATE PO  Self Yes No   Sig: Take 1,000 mg by mouth daily     KIONEX 15 GM/60ML suspension  Self Yes No   Sig: Take by mouth Takes as a shake on dialysis days Tues-Thurs_Sat   NIFEdipine (ADALAT CC) 30 MG 24 hr tablet   No No   Sig: Take 1 tablet (30 mg total) by mouth daily   NOVOLOG FLEXPEN 100 units/mL SOPN  Self Yes No   Sig: INJECT 1 TO 5 UNITS SUBCUTANEOUSLY THREE TIMES A DAY BEFORE MELAS AS PER SLIDING SCALE   Podiatric Products (Flexitol Heel Balm) OINT   No No   Sig: Apply topically once for 1 dose   acetaminophen (TYLENOL) 325 mg tablet  Self No No   Sig: Take 2 tablets (650 mg total) by mouth every 6 (six) hours as needed for mild pain or fever   albuterol (ProAir HFA) 90 mcg/act inhaler   No No   Sig: Inhale 2 puffs every 6 (six) hours as needed for wheezing or shortness of breath   atorvastatin (LIPITOR) 20 mg tablet  Self Yes No   Sig: Take 20 mg by mouth every evening    atropine (ISOPTO ATROPINE) 1 % ophthalmic solution  Self Yes No   Sig: Administer 1 drop to both eyes daily at bedtime    benzonatate (TESSALON PERLES) 100 mg capsule  Self No No   Sig: Take 1 capsule (100 mg total) by mouth 3 (three) times a day as needed for cough   brimonidine (ALPHAGAN P) 0 15 % ophthalmic solution  Self Yes No   Sig: Administer 1 drop to both eyes 2 (two) times a day    carvedilol (COREG) 25 mg tablet  Self Yes No   Sig: Take 25 mg by mouth 2 (two) times a day with meals     cinacalcet (SENSIPAR) 30 mg tablet  Self Yes No   Sig: Take 30 mg by mouth daily   diclofenac sodium (VOLTAREN) 1 %  Self No No   Sig: Apply 2 g topically 4 (four) times a day   diphenhydrAMINE (BENADRYL) 25 mg capsule  Self Yes No   Sig: Take by mouth as needed for itching     insulin glargine (LANTUS) 100 units/mL subcutaneous injection  Self No No   Sig: Inject 20 Units under the skin daily at bedtime   levothyroxine 50 mcg tablet  Self Yes No   Sig: Take 50 mcg by mouth daily   lidocaine (LIDODERM) 5 %  Self Yes No   Sig: APPLY 1 PATCH BY TRANDERMAL ROUTE EVERY DAY (MAY WEAR UP TO 12 HOURS)     loratadine (CLARITIN) 10 mg tablet  Self Yes No   Sig: Take 10 mg by mouth daily nystatin (MYCOSTATIN) powder  Self No No   Sig: Apply topically 2 (two) times a day   ondansetron (ZOFRAN) 4 mg tablet  Self No No   Sig: Take 1 tablet (4 mg total) by mouth every 8 (eight) hours as needed for nausea or vomiting   pantoprazole (PROTONIX) 40 mg tablet  Self No No   Sig: Take 1 tablet (40 mg total) by mouth daily in the early morning   prednisoLONE acetate (PRED FORTE) 1 % ophthalmic suspension  Self Yes No   Sig: Administer 1 drop to both eyes 4 (four) times a day Patient takes only occasionally   pregabalin (LYRICA) 50 mg capsule   No No   Sig: Take 1 capsule (50 mg total) by mouth daily   senna (SENOKOT) 8 6 mg  Self No No   Sig: Take 2 tablets (17 2 mg total) by mouth daily at bedtime as needed for constipation   sertraline (ZOLOFT) 50 mg tablet  Self No No   Sig: Take 1 tablet (50 mg total) by mouth daily   sevelamer (RENAGEL) 800 mg tablet  Self No No   Sig: Take 1 tablet (800 mg total) by mouth 3 (three) times a day with meals   sevelamer carbonate (RENVELA) 800 mg tablet   Yes No   Sig: TAKE 3 TABLETS 3 TIMES A DAY WITH MEALS AND 1 TABLET TWICE A DAY WITH SNACKS   sodium chloride (OCEAN) 0 65 % nasal spray  Self No No   Si spray into each nostril 3 (three) times a day as needed for congestion   torsemide (DEMADEX) 100 mg tablet  Self Yes No   Sig: Take 100 mg by mouth every 12 (twelve) hours    traMADol (ULTRAM) 50 mg tablet   Yes No   Sig: PLEASE SEE ATTACHED FOR DETAILED DIRECTIONS   warfarin (COUMADIN) 3 mg tablet  Self No No   Sig: Take 3 tablets (9 mg total) by mouth daily Takes 9 mg Monday Sat      Facility-Administered Medications: None       Past Medical History:   Diagnosis Date    Abnormal uterine bleeding (AUB)     Anxiety     Arthritis     Chronic kidney disease     Claustrophobia     Diabetes mellitus (HCC)     Disease of thyroid gland     DVT (deep venous thrombosis) (HCC)     End stage kidney disease (HCC)     [facility] to [facility]     Hemodialysis patient ([facility] [address] [facility]     Tues, Thurs, Sat    Hypertension     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[facility] [facility]     right eye    Morbid obesity ([facility] [facility]     Osteomyelitis of fifth toe of right foot ([facility] [address] [facility]     Panic attacks     Pulmonary embolism (HCC)     Reflux esophagitis     Thrombophlebitis of saphenous vein     Warfarin anticoagulation        Past Surgical History:   Procedure Laterality Date    AMPUTATION      r 4th toe    ARTERIOVENOUS GRAFT PLACEMENT      CATARACT EXTRACTION Bilateral     CERVICAL BIOPSY  W/ LOOP ELECTRODE EXCISION       SECTION      DIALYSIS FISTULA CREATION      DILATION AND CURETTAGE OF UTERUS      ENDOMETRIAL ABLATION W/ NOVASURE      EYE SURGERY      HYSTERECTOMY      @ age 57 (complete)    IR AV FISTULAGRAM/GRAFTOGRAM  10/11/2019    IR AV FISTULAGRAM/GRAFTOGRAM  2020    IR AV FISTULAGRAM/GRAFTOGRAM  2020    IR NON-TUNNELED CENTRAL LINE PLACEMENT  2021    OOPHORECTOMY Bilateral     @ age 57    PERICARDIUM SURGERY      ID AMPUTATION TOE,MT-P JT Right 2020    Procedure: AMPUTATION TOE- 5th;  Surgeon: Maynor Bundy DPM;  Location: [facility];  Service: Podiatry    ID COLONOSCOPY FLX DX W/COLLJ SPEC WHEN PFRMD N/A 3/13/2019    Procedure: COLONOSCOPY;  Surgeon: Castillo Whitfield MD;  Location: [facility]; Service: Gastroenterology    ID ESOPHAGOGASTRODUODENOSCOPY TRANSORAL DIAGNOSTIC N/A 3/13/2019    Procedure: ESOPHAGOGASTRODUODENOSCOPY (EGD); Surgeon: Castillo Whitfield MD;  Location: [facility];   Service: Gastroenterology    ID LAPAROSCOPY W TOT HYSTERECT UTERUS 250 GRAM OR LESS N/A 2016    Procedure: HYSTERECTOMY LAPAROSCOPIC TOTAL [facility]), with bilateral salpingectomy;  Surgeon: Nehal Patel DO;  [facility]: [facility];  Service: Gynecology    THROMBECTOMY / ARTERIOVENOUS GRAFT REVISION      TOE SURGERY Right     removal of the 4th toe       Family History   Problem Relation Age of Onset    Heart disease Family     Diabetes Family     Heart disease Mother     Cancer Brother         neck    Throat cancer Brother     Ovarian cancer Maternal Aunt 36     I have reviewed and agree with the history as documented  E-Cigarette/Vaping    E-Cigarette Use Never User      E-Cigarette/Vaping Substances    Nicotine No     THC No     CBD No     Flavoring No     Other No     Unknown No      Social History     Tobacco Use    Smoking status: Never Smoker    Smokeless tobacco: Never Used   Vaping Use    Vaping Use: Never used   Substance Use Topics    Alcohol use: Never     Alcohol/week: 0 0 standard drinks    Drug use: No        Review of Systems   Constitutional: Negative for chills and fever  HENT: Negative for ear pain and sore throat  Eyes: Negative for pain and visual disturbance  Respiratory: Negative for cough and shortness of breath  Cardiovascular: Negative for chest pain and palpitations  Gastrointestinal: Positive for abdominal pain, nausea and vomiting  Genitourinary: Negative for dysuria and hematuria  Musculoskeletal: Positive for back pain (chronic) and gait problem (chronic)  Negative for arthralgias  Skin: Positive for wound (chronic)  Negative for color change and rash  Neurological: Negative for seizures and syncope  All other systems reviewed and are negative  Physical Exam  ED Triage Vitals   Temperature Pulse Respirations Blood Pressure SpO2   09/13/21 1710 09/13/21 1710 09/13/21 1710 09/13/21 1710 09/13/21 1710   97 9 °F (36 6 °C) 77 18 136/77 96 %      Temp Source Heart Rate Source Patient Position - Orthostatic VS BP Location FiO2 (%)   09/13/21 1710 09/13/21 2123 09/13/21 2123 09/13/21 2123 --   Tympanic Monitor Lying Right arm       Pain Score       09/13/21 1710       9             Orthostatic Vital Signs  Vitals:    09/13/21 1710 09/13/21 2123   BP: 136/77 165/68   Pulse: 77 71   Patient Position - Orthostatic VS:  Lying       Physical Exam  Vitals and nursing note reviewed  Constitutional:       General: She is not in acute distress  Appearance: She is well-developed  She is obese  HENT:      Head: Normocephalic and atraumatic  Eyes:      Conjunctiva/sclera: Conjunctivae normal    Cardiovascular:      Rate and Rhythm: Normal rate and regular rhythm  Pulses:           Dorsalis pedis pulses are 1+ on the right side and 1+ on the left side  Heart sounds: No murmur heard  Pulmonary:      Effort: Pulmonary effort is normal  No respiratory distress  Breath sounds: Normal breath sounds  Abdominal:      Palpations: Abdomen is soft  Tenderness: There is abdominal tenderness in the left lower quadrant  Musculoskeletal:      Cervical back: Neck supple  Feet:    Skin:     General: Skin is warm and dry  Neurological:      Mental Status: She is alert           ED Medications  Medications   albuterol (PROVENTIL HFA,VENTOLIN HFA) inhaler 2 puff (2 puffs Inhalation Given 9/13/21 2349)   ALPRAZolam (XANAX) tablet 0 25 mg (has no administration in time range)   atorvastatin (LIPITOR) tablet 20 mg (20 mg Oral Given 9/13/21 2349)   b complex-vitamin C-folic acid (NEPHROCAPS) capsule 1 capsule (has no administration in time range)   brimonidine tartrate 0 2 % ophthalmic solution 1 drop (has no administration in time range)   carvedilol (COREG) tablet 25 mg (has no administration in time range)   cinacalcet (SENSIPAR) tablet 30 mg (has no administration in time range)   Diclofenac Sodium (VOLTAREN) 1 % topical gel 2 g (0 g Topical Hold 9/14/21 0118)   insulin glargine (LANTUS) subcutaneous injection 20 Units 0 2 mL (20 Units Subcutaneous Given 9/13/21 2349)   levothyroxine tablet 50 mcg (has no administration in time range)   lidocaine (LIDODERM) 5 % patch 1 patch (has no administration in time range)   loratadine (CLARITIN) tablet 10 mg (has no administration in time range)   NIFEdipine (PROCARDIA XL) 24 hr tablet 30 mg (has no administration in time range) nystatin (MYCOSTATIN) powder (has no administration in time range)   pantoprazole (PROTONIX) EC tablet 40 mg (has no administration in time range)   pregabalin (LYRICA) capsule 50 mg (has no administration in time range)   sertraline (ZOLOFT) tablet 50 mg (has no administration in time range)   sevelamer (RENAGEL) tablet 800 mg (has no administration in time range)   torsemide (DEMADEX) tablet 100 mg (0 mg Oral Hold 9/14/21 0118)   warfarin (COUMADIN) tablet 9 mg (has no administration in time range)   acetaminophen (TYLENOL) tablet 650 mg (has no administration in time range)   ondansetron (ZOFRAN) injection 4 mg (has no administration in time range)   insulin lispro (HumaLOG) 100 units/mL subcutaneous injection 1-6 Units (has no administration in time range)   insulin lispro (HumaLOG) 100 units/mL subcutaneous injection 1-6 Units (0 Units Subcutaneous Hold 9/14/21 0120)   oxyCODONE-acetaminophen (PERCOCET) 5-325 mg per tablet 1 tablet (has no administration in time range)   ciprofloxacin (CIPRO) IVPB (premix in 5% dextrose) 400 mg 200 mL (has no administration in time range)   metroNIDAZOLE (FLAGYL) IVPB (premix) 500 mg 100 mL (500 mg Intravenous New Bag 9/14/21 0121)   HYDROmorphone (DILAUDID) injection 1 mg (1 mg Intravenous Given 9/13/21 2010)   ondansetron (ZOFRAN) injection 4 mg (4 mg Intravenous Given 9/13/21 2011)   piperacillin-tazobactam (ZOSYN) 3 375 g in sodium chloride 0 9 % 100 mL IVPB (0 g Intravenous Stopped 9/14/21 0019)       Diagnostic Studies  Results Reviewed     Procedure Component Value Units Date/Time    STAT INR [[id number]]  (Abnormal) Collected: 09/13/21 2255    Lab Status: Final result Specimen: Blood from Arm, Right Updated: 09/13/21 2340     Protime 34 5 seconds      INR 3 66    Comprehensive metabolic panel [[id number]]  (Abnormal) Collected: 09/13/21 2101    Lab Status: Final result Specimen: Blood from Arm, Right Updated: 09/13/21 2139     Sodium 134 mmol/L      Potassium 5 5 mmol/L Chloride 102 mmol/L      CO2 25 mmol/L      ANION GAP 7 mmol/L      BUN 79 mg/dL      Creatinine 9 02 mg/dL      Glucose 166 mg/dL      Calcium 8 5 mg/dL      Corrected Calcium 9 6 mg/dL      AST 12 U/L      ALT 22 U/L      Alkaline Phosphatase 146 U/L      Total Protein 6 7 g/dL      Albumin 2 6 g/dL      Total Bilirubin 0 39 mg/dL      eGFR 4 ml/min/1 73sq m     Narrative:      National Kidney Disease Foundation guidelines for Chronic Kidney Disease (CKD):     Stage 1 with normal or high GFR (GFR > 90 mL/min/1 73 square meters)    Stage 2 Mild CKD (GFR = 60-89 mL/min/1 73 square meters)    Stage 3A Moderate CKD (GFR = 45-59 mL/min/1 73 square meters)    Stage 3B Moderate CKD (GFR = 30-44 mL/min/1 73 square meters)    Stage 4 Severe CKD (GFR = 15-29 mL/min/1 73 square meters)    Stage 5 End Stage CKD (GFR <15 mL/min/1 73 square meters)  Note: GFR calculation is accurate only with a steady state creatinine    Lipase [[id number]]  (Abnormal) Collected: 09/13/21 2101    Lab Status: Final result Specimen: Blood from Arm, Right Updated: 09/13/21 2139     Lipase 48 u/L     Fingerstick Glucose (POCT) [[id number]]  (Abnormal) Collected: 09/13/21 2030    Lab Status: Final result Updated: 09/13/21 2031     POC Glucose 167 mg/dl     CBC and differential [[id number]]  (Abnormal) Collected: 09/13/21 2009    Lab Status: Final result Specimen: Blood from Arm, Right Updated: 09/13/21 2027     WBC 8 76 Thousand/uL      RBC 2 71 Million/uL      Hemoglobin 8 6 g/dL      Hematocrit 26 4 %      MCV 97 fL      MCH 31 7 pg      MCHC 32 6 g/dL      RDW 13 7 %      MPV 11 2 fL      Platelets 035 Thousands/uL      nRBC 0 /100 WBCs      Neutrophils Relative 75 %      Immat GRANS % 1 %      Lymphocytes Relative 14 %      Monocytes Relative 8 %      Eosinophils Relative 2 %      Basophils Relative 0 %      Neutrophils Absolute 6 65 Thousands/µL      Immature Grans Absolute 0 08 Thousand/uL      Lymphocytes Absolute 1 18 Thousands/µL Monocytes Absolute 0 67 Thousand/µL      Eosinophils Absolute 0 15 Thousand/µL      Basophils Absolute 0 03 Thousands/µL                  CT abdomen pelvis wo contrast   Final Result by Leah Bliss MD (09/13 2115)      Mild acute sigmoid diverticulitis  No extraluminal free air or abscess formation identified  Remainder of the findings, as described above  The study was marked in [place] [id number] for immediate notification  Workstation performed: [id number]         XR foot 3+ views RIGHT    (Results Pending)         Procedures  Procedures      ED Course                                       MDM  Number of Diagnoses or Management Options  Diverticulitis  Diagnosis management comments: 51-year-old female presented to the emergency department for evaluation of left lower quadrant abdominal pain  On arrival patient was awake, alert, oriented and in moderate painful distress  Initial vital signs were stable  On exam the patient had tenderness to palpation of her left lower quadrant  The patient was treated with analgesic medication  Workup done in the emergency department was consistent with diverticulitis  The patient was given a dose of IV antibiotics and will be admitted to the hospital for further treatment and evaluation        Disposition  Final diagnoses:   Diverticulitis     Time reflects when diagnosis was documented in both MDM as applicable and the Disposition within this note     Time User Action Codes Description Comment    9/13/2021 10:21 PM Corky Spitz Add [K57 92] Diverticulitis     9/13/2021 10:32 PM Dariana Bilberry Add [N18 6,  Z99 2] ESRD on hemodialysis ([facility] [address] )     9/13/2021 11:37 PM Dariana Bilberry Add [L97 512] Right foot ulcer, with fat layer exposed [facility])       ED Disposition     ED Disposition Condition Date/Time Comment    Admit Stable Mon Sep 13, 2021 10:21 PM Case was discussed with HARISH and the patient's admission status was agreed to be Admission Status: observation status to the service of Dr Freddie Kim   Follow-up Information    None         Patient's Medications   Discharge Prescriptions    No medications on file     No discharge procedures on file  PDMP Review       Value Time User    PDMP Reviewed  Yes 11/25/2020  3:50 PM Keenan Sicard, Scott Andrade           ED Provider  Attending physically available and evaluated Dary Sidhu I managed the patient along with the ED Attending      Electronically Signed by         Lucy Nelson MD  09/14/21 [id number]

## 2021-09-14 NOTE — ASSESSMENT & PLAN NOTE
Lab Results   Component Value Date    EGFR 4 09/13/2021    EGFR 6 06/28/2021    EGFR 6 06/27/2021    CREATININE 9 02 (H) 09/13/2021    CREATININE 7 57 (H) 06/28/2021    CREATININE 6 80 (H) 06/27/2021   Hemodialysis on Tuesday Thursday and Saturday  Continue multiple end-stage renal disease meds  Nephrology consult

## 2021-09-15 ENCOUNTER — TELEPHONE (OUTPATIENT)
Dept: RADIOLOGY | Facility: CLINIC | Age: 56
End: 2021-09-15

## 2021-09-15 ENCOUNTER — APPOINTMENT (INPATIENT)
Dept: DIALYSIS | Facility: HOSPITAL | Age: 56
DRG: 391 | End: 2021-09-15
Payer: MEDICARE

## 2021-09-15 ENCOUNTER — APPOINTMENT (OUTPATIENT)
Dept: RADIOLOGY | Facility: HOSPITAL | Age: 56
DRG: 391 | End: 2021-09-15
Payer: MEDICARE

## 2021-09-15 VITALS
DIASTOLIC BLOOD PRESSURE: 63 MMHG | HEART RATE: 77 BPM | TEMPERATURE: 98 F | RESPIRATION RATE: 16 BRPM | BODY MASS INDEX: 44.22 KG/M2 | SYSTOLIC BLOOD PRESSURE: 100 MMHG | OXYGEN SATURATION: 92 % | WEIGHT: 274 LBS

## 2021-09-15 DIAGNOSIS — Z79.01 CHRONIC ANTICOAGULATION: Primary | ICD-10-CM

## 2021-09-15 LAB
ANION GAP SERPL CALCULATED.3IONS-SCNC: 5 MMOL/L (ref 4–13)
BUN SERPL-MCNC: 58 MG/DL (ref 5–25)
CALCIUM SERPL-MCNC: 8 MG/DL (ref 8.3–10.1)
CHLORIDE SERPL-SCNC: 99 MMOL/L (ref 100–108)
CO2 SERPL-SCNC: 27 MMOL/L (ref 21–32)
CREAT SERPL-MCNC: 7.03 MG/DL (ref 0.6–1.3)
GFR SERPL CREATININE-BSD FRML MDRD: 6 ML/MIN/1.73SQ M
GLUCOSE SERPL-MCNC: 160 MG/DL (ref 65–140)
GLUCOSE SERPL-MCNC: 166 MG/DL (ref 65–140)
GLUCOSE SERPL-MCNC: 213 MG/DL (ref 65–140)
GLUCOSE SERPL-MCNC: 286 MG/DL (ref 65–140)
INR PPP: 2.21 (ref 0.84–1.19)
POTASSIUM SERPL-SCNC: 5.1 MMOL/L (ref 3.5–5.3)
PROTHROMBIN TIME: 23.4 SECONDS (ref 11.6–14.5)
SODIUM SERPL-SCNC: 131 MMOL/L (ref 136–145)

## 2021-09-15 PROCEDURE — G1004 CDSM NDSC: HCPCS

## 2021-09-15 PROCEDURE — 5A1D70Z PERFORMANCE OF URINARY FILTRATION, INTERMITTENT, LESS THAN 6 HOURS PER DAY: ICD-10-PCS | Performed by: HOSPITALIST

## 2021-09-15 PROCEDURE — 82948 REAGENT STRIP/BLOOD GLUCOSE: CPT

## 2021-09-15 PROCEDURE — 80048 BASIC METABOLIC PNL TOTAL CA: CPT | Performed by: PHYSICIAN ASSISTANT

## 2021-09-15 PROCEDURE — 93925 LOWER EXTREMITY STUDY: CPT | Performed by: SURGERY

## 2021-09-15 PROCEDURE — 90935 HEMODIALYSIS ONE EVALUATION: CPT | Performed by: INTERNAL MEDICINE

## 2021-09-15 PROCEDURE — 93922 UPR/L XTREMITY ART 2 LEVELS: CPT | Performed by: SURGERY

## 2021-09-15 PROCEDURE — 99238 HOSP IP/OBS DSCHRG MGMT 30/<: CPT | Performed by: PHYSICIAN ASSISTANT

## 2021-09-15 PROCEDURE — 85610 PROTHROMBIN TIME: CPT | Performed by: PHYSICIAN ASSISTANT

## 2021-09-15 PROCEDURE — 73718 MRI LOWER EXTREMITY W/O DYE: CPT

## 2021-09-15 RX ORDER — AMOXICILLIN AND CLAVULANATE POTASSIUM 500; 125 MG/1; MG/1
1 TABLET, FILM COATED ORAL EVERY 24 HOURS
Qty: 7 TABLET | Refills: 0 | Status: SHIPPED | OUTPATIENT
Start: 2021-09-15 | End: 2021-09-22

## 2021-09-15 RX ORDER — AMOXICILLIN AND CLAVULANATE POTASSIUM 500; 125 MG/1; MG/1
1 TABLET, FILM COATED ORAL EVERY 24 HOURS
Status: DISCONTINUED | OUTPATIENT
Start: 2021-09-15 | End: 2021-09-15 | Stop reason: HOSPADM

## 2021-09-15 RX ORDER — AMOXICILLIN AND CLAVULANATE POTASSIUM 500; 125 MG/1; MG/1
1 TABLET, FILM COATED ORAL EVERY 12 HOURS
Status: DISCONTINUED | OUTPATIENT
Start: 2021-09-15 | End: 2021-09-15

## 2021-09-15 RX ORDER — OXYCODONE HYDROCHLORIDE AND ACETAMINOPHEN 5; 325 MG/1; MG/1
1 TABLET ORAL EVERY 6 HOURS PRN
Qty: 20 TABLET | Refills: 0 | Status: SHIPPED | OUTPATIENT
Start: 2021-09-15 | End: 2021-09-25

## 2021-09-15 RX ORDER — DICYCLOMINE HYDROCHLORIDE 10 MG/1
10 CAPSULE ORAL
Qty: 28 CAPSULE | Refills: 0 | Status: SHIPPED | OUTPATIENT
Start: 2021-09-15 | End: 2021-11-13 | Stop reason: HOSPADM

## 2021-09-15 RX ADMIN — COLLAGENASE SANTYL: 250 OINTMENT TOPICAL at 14:06

## 2021-09-15 RX ADMIN — PREGABALIN 50 MG: 50 CAPSULE ORAL at 08:06

## 2021-09-15 RX ADMIN — NYSTATIN: 100000 POWDER TOPICAL at 08:08

## 2021-09-15 RX ADMIN — LORATADINE 10 MG: 10 TABLET ORAL at 08:05

## 2021-09-15 RX ADMIN — METRONIDAZOLE 500 MG: 500 INJECTION, SOLUTION INTRAVENOUS at 12:09

## 2021-09-15 RX ADMIN — DICYCLOMINE HYDROCHLORIDE 10 MG: 10 CAPSULE ORAL at 04:59

## 2021-09-15 RX ADMIN — INSULIN LISPRO 4 UNITS: 100 INJECTION, SOLUTION INTRAVENOUS; SUBCUTANEOUS at 08:09

## 2021-09-15 RX ADMIN — SEVELAMER HYDROCHLORIDE 800 MG: 800 TABLET, FILM COATED PARENTERAL at 08:05

## 2021-09-15 RX ADMIN — BRIMONIDINE TARTRATE 1 DROP: 2 SOLUTION OPHTHALMIC at 08:07

## 2021-09-15 RX ADMIN — OXYCODONE HYDROCHLORIDE AND ACETAMINOPHEN 1 TABLET: 5; 325 TABLET ORAL at 04:58

## 2021-09-15 RX ADMIN — SERTRALINE HYDROCHLORIDE 50 MG: 50 TABLET ORAL at 08:05

## 2021-09-15 RX ADMIN — SEVELAMER HYDROCHLORIDE 800 MG: 800 TABLET, FILM COATED PARENTERAL at 13:57

## 2021-09-15 RX ADMIN — PANTOPRAZOLE SODIUM 40 MG: 40 TABLET, DELAYED RELEASE ORAL at 04:58

## 2021-09-15 RX ADMIN — ALPRAZOLAM 0.25 MG: 0.25 TABLET ORAL at 08:04

## 2021-09-15 RX ADMIN — LEVOTHYROXINE SODIUM 50 MCG: 50 TABLET ORAL at 04:58

## 2021-09-15 RX ADMIN — CINACALCET 30 MG: 30 TABLET, FILM COATED ORAL at 08:06

## 2021-09-15 RX ADMIN — DICLOFENAC SODIUM 2 G: 10 GEL TOPICAL at 13:46

## 2021-09-15 RX ADMIN — OXYCODONE HYDROCHLORIDE AND ACETAMINOPHEN 1 TABLET: 5; 325 TABLET ORAL at 13:58

## 2021-09-15 RX ADMIN — DICYCLOMINE HYDROCHLORIDE 10 MG: 10 CAPSULE ORAL at 13:58

## 2021-09-15 RX ADMIN — INSULIN LISPRO 1 UNITS: 100 INJECTION, SOLUTION INTRAVENOUS; SUBCUTANEOUS at 14:06

## 2021-09-15 NOTE — UTILIZATION REVIEW
Inpatient order of 9-14 was entered with incorrect physician name  Inpatient patient class is correct

## 2021-09-15 NOTE — DISCHARGE SUMMARY
[address] [facility]  Discharge- Rashad Fiddler 1965, 49 y o  female MRN: [id number]  Unit/Bed#: -01 Encounter: [id number]  Primary Care Provider: Lydia Graves MD   Date and time admitted to hospital: 9/13/2021  6:32 PM    * Sigmoid diverticulitis  Assessment & Plan  · Patient presented with progressive LLQ abdominal pain  · CT of abdomen and pelvis: "Mild acute sigmoid diverticulitis  No extraluminal free air or abscess formation identified "  · No sirs/sepsis criteria preset on admissoin   · Tolerating regular diet   · Transition from IV to oral antibiotics at discharge to complete total 7 day course  · GI consult appreciated, will need outpatient follow-up and colonoscopy     Right foot ulcer, with fat layer exposed ([address] )  Assessment & Plan  · Status post recent debridement by Podiatry  · X-ray of right foot: Findings suspicious for focal chronic osteomyelitis along medial aspect of right 1st distal phalanx adjacent to a soft tissue wound  Unchanged linear metallic density in the soft tissues of right 3rd toe plantar aspect  · MRI right foot: 1  Prominent 1st MTP degenerative change with plantar and medial skin ulcerations in this region including associated subcutaneous edema indicating cellulitis   No abscess   No evidence of osteomyelitis  2  Postoperative changes noted after resection of the 2nd metatarsal head and 4th and 5th toes     · LEADs without significant arterial occlusive disease   · Appreciate podiatry consult and recommendations   · Local wound care with outpatient follow-up     Type 2 diabetes mellitus with neurologic complication, with long-term current use of insulin [facility])  Assessment & Plan  Lab Results   Component Value Date    HGBA1C 8 4 (H) 06/11/2021       Recent Labs     09/14/21  1256 09/14/21  1635 09/14/21  2122 09/15/21  0629   POCGLU 126 235* 325* 286*       Blood Sugar Average: Last 72 hrs:  (P) 220 [id number]     · Resume prior to admission medical management at discharge  · Outpatient follow-up with PCP and/or endocrinology    Anemia  Assessment & Plan  · Anemia secondary to end-stage renal disease  · Hemoglobin remains stable within baseline     ESRD on hemodialysis [facility])  Assessment & Plan  Lab Results   Component Value Date    EGFR 6 09/15/2021    EGFR 4 09/14/2021    EGFR 4 09/13/2021    CREATININE 7 03 (H) 09/15/2021    CREATININE 9 89 (H) 09/14/2021    CREATININE 9 02 (H) 09/13/2021   · Hemodialysis on Tuesday Thursday and Saturday  · Continue with multiple end-stage renal disease meds  · Nephrology consult appreciated     Essential hypertension  Assessment & Plan  · BP acceptable, monitor routinely   · Continue home blood pressure meds with holding parameters    History of DVT (deep vein thrombosis)/PE  Assessment & Plan  · INR intially supratherpeutic and coumadin was held   · INR down to 2 21 today   · Resume home dose coumadin tonight with ongoing management as outpatient     Acquired hypothyroidism  Assessment & Plan  On levothyroxine    Morbid obesity ([address] )  Assessment & Plan  · Low-calorie diet when acceptable    Medical Problems     Resolved Problems  Date Reviewed: 9/15/2021    None              Discharging Physician / Practitioner: Judy Sanchez PA-C  PCP: Mumtaz Stanley MD  Admission Date:   Admission Orders (From admission, onward)     Ordered        09/14/21 1337  Inpatient Admission  Once         09/13/21 2222  Place in Observation  Once                   Discharge Date: 09/15/21    Consultations During Hospital Stay:  · Gastroenterology  · Podiatry    Procedures Performed:   · None    Significant Findings / Test Results:   · Outlined above    Incidental Findings:   · None     Test Results Pending at Discharge (will require follow up):    · None     Outpatient Tests Requested:  · Colonoscopy in 6-8 weeks  · Outpatient follow-up with wound care/podiatry    Complications:  None    Reason for Admission:  Acute sigmoid diverticulitis    Hospital Course:   Jesi Xiao is a 49 y o  female patient who originally presented to the hospital on 9/13/2021 due to progressive left lower quadrant pain  CT scan revealed sigmoid diverticulitis and patient was started on IV Cipro and Flagyl  Diet was advanced and tolerated  Patient was transition to oral Augmentin to complete total 7 day course  Patient was noted to have right foot ulcer and was seen in consultation by Podiatry  MRI was negative for osteomyelitis, full report outlined above  Podiatry recommended continuing with local wound care and outpatient follow-up  Patient was arranged with [facility] for dressing changes  She was discharged home with Percocet and Bentyl and will follow-up with PCP within 1 week  Ambulatory referral made for Gastroenterology  Please see above list of diagnoses and related plan for additional information  Condition at Discharge: fair    Discharge Day Visit / Exam:   Subjective:  Patient reports feeling better today  Continues to have LLQ abdominal pain, particularly with movement, but states this is improving  She is tolerating regular diet  Still no nausea, vomiting or diarrhea  Agreeable to discharge and I encouraged outpatient follow-up with PCP  Vitals: Blood Pressure: 100/63 (09/15/21 1534)  Pulse: 77 (09/15/21 1245)  Temperature: 98 °F (36 7 °C) (09/15/21 1534)  Temp Source: Oral (09/15/21 0955)  Respirations: 16 (09/15/21 1245)  Weight - Scale: 124 kg (274 lb) (09/13/21 1710)  SpO2: 92 % (09/15/21 0747)  Exam:   Physical Exam  Vitals and nursing note reviewed  Exam conducted with a chaperone present  Constitutional:       General: She is not in acute distress  Appearance: She is morbidly obese  Cardiovascular:      Rate and Rhythm: Normal rate and regular rhythm  Heart sounds: No murmur heard  Pulmonary:      Effort: Pulmonary effort is normal  No respiratory distress  Breath sounds: No wheezing or rales  Abdominal:      General: Abdomen is flat  Bowel sounds are normal  There is no distension  Palpations: Abdomen is soft  Tenderness: There is abdominal tenderness  Musculoskeletal:      Right lower leg: No edema  Left lower leg: No edema  Skin:     Comments: R foot dressing c/d/i   Neurological:      General: No focal deficit present  Mental Status: She is alert and oriented to person, place, and time  Mental status is at baseline  Discussion with Family: Patient declined call to   Discharge instructions/Information to patient and family:   See after visit summary for information provided to patient and family  Provisions for Follow-Up Care:  See after visit summary for information related to follow-up care and any pertinent home health orders  Disposition:   Home with [facility] Services (Reminder: Complete face to face encounter)    Planned Readmission: no     Discharge Statement:  I spent 20 minutes discharging the patient  This time was spent on the day of discharge  I had direct contact with the patient on the day of discharge  Greater than 50% of the total time was spent examining patient, answering all patient questions, arranging and discussing plan of care with patient as well as directly providing post-discharge instructions  Additional time then spent on discharge activities  Discharge Medications:  See after visit summary for reconciled discharge medications provided to patient and/or family        **Please Note: This note may have been constructed using a voice recognition system**

## 2021-09-15 NOTE — PLAN OF CARE
Post-Dialysis RN Treatment Note    Blood Pressure:  Pre 108/43 mm/Hg  Post 111/69 mmHg   EDW  122 7 kg    Weight:  Pre 129 8 kg   Post 125 9 kg   Mode of weight measurement: Standing Scale   Volume Removed  3500 ml    Treatment duration 180 minutes    NS given  no   Treatment shortened?  No   Medications given during Rx Flagyl 500 mg   Estimated Kt/V  n/a   Access type: AV fistula   Access Status: Yes, describe: maintained  during tx    Report called to primary nurse   Yes Petar Holt RN  For 3 hr tx, 3L net fluid removal ; UF only ;2K bath  Problem: METABOLIC, FLUID AND ELECTROLYTES - ADULT  Goal: Electrolytes maintained within normal limits  Description: INTERVENTIONS:  - Monitor labs and assess patient for signs and symptoms of electrolyte imbalances  - Administer electrolyte replacement as ordered  - Monitor response to electrolyte replacements, including repeat lab results as appropriate  - Instruct patient on fluid and nutrition as appropriate  Outcome: Progressing  Goal: Fluid balance maintained  Description: INTERVENTIONS:  - Monitor labs   - Monitor I/O and WT  - Instruct patient on fluid and nutrition as appropriate  - Assess for signs & symptoms of volume excess or deficit  Outcome: Progressing

## 2021-09-15 NOTE — UTILIZATION REVIEW
The inpatient order entered on 9-14 was entered in error on the wrong patient  Order cancelled  Observation is correct

## 2021-09-15 NOTE — DISCHARGE INSTRUCTIONS
Discharge Instructions - Podiatry    Weight Bearing Status: Weight bearing as tolerated                    Pain: Continue analgesics as directed    Follow-up appointment instructions: Please make an appointment within one week of discharge with Dr Aruna Jacques  Contact sooner if any increase in pain, or signs of infection occur    Wound Care: Leave dressings clean, dry, and intact between professional dressing changes    Nursing Instructions: Please apply Santyl agatha thickness to wound bed    Then cover with Gauze and secure with Kerlix and tape  Please change dressing every Monday, Wednesday, and Friday   Diverticulitis   AMBULATORY CARE:   Diverticulitis  is a condition that causes small pockets along your intestine called diverticula to become inflamed or infected  This is caused by hard bowel movement, food, or bacteria that get stuck in the pockets  Signs and symptoms include the following:   · Pain in the lower left side of your abdomen    · Fever and chills    · Nausea or vomiting     · Constipation or diarrhea     · An urge to urinate or have a bowel movement more often than usual     · Bloody bowel movements    · Bloating and gas    Seek care immediately if:   · You have bowel movement or foul-smelling discharge leaking from your vagina or in your urine  · You have severe diarrhea  · You urinate less than usual or not at all  · You are not able to have a bowel movement  · You cannot stop vomiting  · You have cramps or severe abdominal pain and a fever  · You have new or increased blood in your bowel movements  Contact your healthcare provider if:   · You have pain when you urinate  · Your symptoms get worse or do not go away  · You have questions or concerns about your condition or care  Treatment:  Mild diverticulitis can be treated at home  You may need to rest and follow a clear liquid diet until your diverticulitis gets better   You will be admitted to the hospital if you have severe diverticulitis  You may need any of the following:  · Antibiotics  help treat or prevent a bacterial infection  · Prescription pain medicine  may be given  Ask your healthcare provider how to take this medicine safely  Some prescription pain medicines contain acetaminophen  Do not take other medicines that contain acetaminophen without talking to your healthcare provider  Too much acetaminophen may cause liver damage  Prescription pain medicine may cause constipation  Ask your healthcare provider how to prevent or treat constipation  · An IV  may be used to give you liquids and nutrition  You may not be able to eat or drink anything until your healthcare provider says it is okay  · Drainage  may be done to reduce inflammation or treat infection  Your healthcare provider may insert a small tube through an incision in your abdomen to drain pus from infected diverticula  · Surgery  may be needed if there is a hole in your bowel or a large amount of swelling  A healthcare provider will remove the infected or inflamed areas of your colon  Clear liquid diet:  A clear liquid diet includes any liquids that you can see through  Examples include water, ginger-heydi, cranberry or apple juice, frozen fruit ice, or broth  Stay on a clear liquid diet until your symptoms are gone, or as directed  Follow up with your healthcare provider as directed: You may need to return for a colonoscopy  When your symptoms are gone, you may need a low-fat, high-fiber diet to prevent diverticulitis from developing again  Your healthcare provider or dietitian can help you create meal plans  Write down your questions so you remember to ask them during your visits  © Copyright [employer] 2021 Information is for End User's use only and may not be sold, redistributed or otherwise used for commercial purposes   All illustrations and images included in CareNotes® are the copyrighted property of [employer]  or [facility]  The above information is an  only  It is not intended as medical advice for individual conditions or treatments  Talk to your doctor, nurse or pharmacist before following any medical regimen to see if it is safe and effective for you

## 2021-09-15 NOTE — ASSESSMENT & PLAN NOTE
Lab Results   Component Value Date    EGFR 6 09/15/2021    EGFR 4 09/14/2021    EGFR 4 09/13/2021    CREATININE 7 03 (H) 09/15/2021    CREATININE 9 89 (H) 09/14/2021    CREATININE 9 02 (H) 09/13/2021   · Hemodialysis on Tuesday Thursday and Saturday  · Continue with multiple end-stage renal disease meds  · Nephrology consult appreciated

## 2021-09-15 NOTE — CASE MANAGEMENT
CM informed pt would like [facility]NA for wound care  CM discussed with pt, would like referral to [facility]  CM placed referral in [address]  [facility] unable to start tomorrow, checking for [facility] Friday

## 2021-09-15 NOTE — ASSESSMENT & PLAN NOTE
Lab Results   Component Value Date    HGBA1C 8 4 (H) 06/11/2021       Recent Labs     09/14/21  1256 09/14/21  1635 09/14/21  2122 09/15/21  0629   POCGLU 126 235* 325* 286*       Blood Sugar Average: Last 72 hrs:  (P) 220 [id number]     · Resume prior to admission medical management at discharge  · Outpatient follow-up with PCP and/or endocrinology

## 2021-09-15 NOTE — ASSESSMENT & PLAN NOTE
· Status post recent debridement by Podiatry  · X-ray of right foot: Findings suspicious for focal chronic osteomyelitis along medial aspect of right 1st distal phalanx adjacent to a soft tissue wound  Unchanged linear metallic density in the soft tissues of right 3rd toe plantar aspect  · MRI right foot: 1  Prominent 1st MTP degenerative change with plantar and medial skin ulcerations in this region including associated subcutaneous edema indicating cellulitis   No abscess   No evidence of osteomyelitis  2  Postoperative changes noted after resection of the 2nd metatarsal head and 4th and 5th toes     · LEADs without significant arterial occlusive disease   · Appreciate podiatry consult and recommendations   · Local wound care with outpatient follow-up

## 2021-09-15 NOTE — ASSESSMENT & PLAN NOTE
· INR intially supratherpeutic and coumadin was held   · INR down to 2 21 today   · Resume home dose coumadin tonight with ongoing management as outpatient

## 2021-09-15 NOTE — PLAN OF CARE
Problem: METABOLIC, FLUID AND ELECTROLYTES - ADULT  Goal: Electrolytes maintained within normal limits  Description: INTERVENTIONS:  - Monitor labs and assess patient for signs and symptoms of electrolyte imbalances  - Administer electrolyte replacement as ordered  - Monitor response to electrolyte replacements, including repeat lab results as appropriate  - Instruct patient on fluid and nutrition as appropriate  Outcome: Progressing     Problem: METABOLIC, FLUID AND ELECTROLYTES - ADULT  Goal: Fluid balance maintained  Description: INTERVENTIONS:  - Monitor labs   - Monitor I/O and WT  - Instruct patient on fluid and nutrition as appropriate  - Assess for signs & symptoms of volume excess or deficit  Outcome: Progressing     Problem: SKIN/TISSUE INTEGRITY - ADULT  Goal: Incision(s), wounds(s) or drain site(s) healing without S/S of infection  Description: INTERVENTIONS  - Assess and document dressing, incision, wound bed, drain sites and surrounding tissue  - Provide patient and family education  - Perform skin care/dressing changes every day  Outcome: Progressing     Problem: Nutrition/Hydration-ADULT  Goal: Nutrient/Hydration intake appropriate for improving, restoring or maintaining nutritional needs  Description: Monitor and assess patient's nutrition/hydration status for malnutrition  Collaborate with interdisciplinary team and initiate plan and interventions as ordered  Monitor patient's weight and dietary intake as ordered or per policy  Utilize nutrition screening tool and intervene as necessary  Determine patient's food preferences and provide high-protein, high-caloric foods as appropriate       INTERVENTIONS:  - Monitor oral intake, urinary output, labs, and treatment plans  - Assess nutrition and hydration status and recommend course of action  - Evaluate amount of meals eaten  - Assist patient with eating if necessary   - Allow adequate time for meals  - Recommend/ encourage appropriate diets, oral nutritional supplements, and vitamin/mineral supplements  - Order, calculate, and assess calorie counts as needed   - Assess need for intravenous fluids  - Provide specific nutrition/hydration education as appropriate  - Include patient/family/caregiver in decisions related to nutrition  Outcome: Progressing     Problem: INFECTION - ADULT  Goal: Absence or prevention of progression during hospitalization  Description: INTERVENTIONS:  - Assess and monitor for signs and symptoms of infection  - Monitor lab/diagnostic results  - Monitor all insertion sites, i e  indwelling lines, tubes, and drains  - Monitor endotracheal if appropriate and nasal secretions for changes in amount and color  - [place] appropriate cooling/warming therapies per order  - Administer medications as ordered  - Instruct and encourage patient and family to use good hand hygiene technique  - Identify and instruct in appropriate isolation precautions for identified infection/condition  Outcome: Progressing     Problem: SAFETY ADULT  Goal: Patient will remain free of falls  Description: INTERVENTIONS:  - Educate patient/family on patient safety including physical limitations  - Instruct patient to call for assistance with activity   - Consult OT/PT to assist with strengthening/mobility   - Keep Call bell within reach  - Keep bed low and locked with side rails adjusted as appropriate  - Keep care items and personal belongings within reach  - Initiate and maintain comfort rounds  - Make Fall Risk Sign visible to staff  - Offer Toileting every 2 Hours, in advance of need  - Initiate/Maintain  - tabs-alarm  - Obtain necessary fall risk management equipment: - Apply yellow socks and bracelet for high fall risk patients  - Consider moving patient to room near nurses station  Outcome: Progressing     Problem: SAFETY ADULT  Goal: Patient will remain free of falls  Description: INTERVENTIONS:  - Educate patient/family on patient safety including physical limitations  - Instruct patient to call for assistance with activity   - Consult OT/PT to assist with strengthening/mobility   - Keep Call bell within reach  - Keep bed low and locked with side rails adjusted as appropriate  - Keep care items and personal belongings within reach  - Initiate and maintain comfort rounds  - Make Fall Risk Sign visible to staff  - Offer Toileting every 2 Hours, in advance of need  - Initiate/Maintain bed/tabs alarm  - Obtain necessary fall risk management equipment:  - Apply yellow socks and bracelet for high fall risk patients  - Consider moving patient to room near nurses station  Outcome: Progressing     Problem: Knowledge Deficit  Goal: Patient/family/caregiver demonstrates understanding of disease process, treatment plan, medications, and discharge instructions  Description: Complete learning assessment and assess knowledge base    Interventions:  - Provide teaching at level of understanding  - Provide teaching via preferred learning methods  Outcome: Progressing

## 2021-09-15 NOTE — PROGRESS NOTES
NEPHROLOGY PROGRESS NOTE   Cliff Polanco 49 y o  female MRN: [id number]  Unit/Bed#: -01 Encounter: [id number]  Reason for Consult: ESRD    ASSESSMENT AND PLAN:  Patient is 62-year-old female with significant medical issues of ESRD on HD, hypertension, history of PE, diabetes presented with lower quadrant left-sided abdominal pain  We are consulted for dialysis management      ESRD on HD on TTS schedule at [address]  -status post HD yesterday  Will do isolated ultrafiltration today  Outpatient dry weight 122 7 kg  She remains significantly above dry weight   -UF removal up to 3 5 L as BP tolerated  I saw and examined patient during hemodialysis treatment at 11:35 AM on 9/15/2021  The patient was receiving isolated ultrafiltration for volume overload in ESRD  I also reviewed vital signs, intake and output, lab results and recent events, and agree with dialysis order  Tolerating UF  BP acceptable     CKD anemia, not on Epogen due to history of PE  Transfuse p r n  For hemoglobin less than seven   -hemoglobin lower, continue to monitor     CKD BMD, continue to monitor phosphorus, PTH      Borderline hyperkalemia, continue to monitor, strict low-potassium diet recommended      Mild hyponatremia, sodium level around 131   Continue to monitor with dialysis and UF removal   Strict fluid restriction 1 2 L per day recommended       Hypertension  -blood pressure acceptable but fluctuating    -on carvedilol 25 mg b i d  , nifedipine XL 30 mg daily      Left lower quadrant pain, CT scan shows mild acute sigmoid diverticulitis, management as per primary team, GI  Overall improving      Suspected focal chronic osteomyelitis along medial aspect of right 1st distal phalanx with soft tissue wound based on x-ray  -MRI this admission shows cellulitis, no abscess or osteomyelitis  Further management as per primary team    SUBJECTIVE:  Patient seen and examined at bedside   No chest pain, shortness of breath, nausea, vomiting  Abdominal pain seems to be slowly improving    OBJECTIVE:  Current Weight: Weight - Scale: 124 kg (274 lb)  Vitals:    09/15/21 1100   BP: 119/81   Pulse: 70   Resp: 16   Temp:    SpO2:        Intake/Output Summary (Last 24 hours) at 9/15/2021 1130  Last data filed at 9/15/2021 0955  Gross per 24 hour   Intake 500 ml   Output --   Net 500 ml     Wt Readings from Last 3 Encounters:   09/13/21 124 kg (274 lb)   09/08/21 128 kg (283 lb)   08/25/21 126 kg (277 lb)     Temp Readings from Last 3 Encounters:   09/15/21 97 9 °F (36 6 °C) (Oral)   07/03/21 98 1 °F (36 7 °C) (Oral)   06/26/21 98 1 °F (36 7 °C) (Oral)     BP Readings from Last 3 Encounters:   09/15/21 119/81   09/13/21 153/77   09/09/21 91/57     Pulse Readings from Last 3 Encounters:   09/15/21 70   09/13/21 73   09/09/21 89        Physical Examination:  General:  Sitting in chair, no acute distress   Eyes:  Mild conjunctival pallor present  ENT:  External examination of ears and nose unremarkable  Neck:  No obvious lymphadenopathy appreciated  Respiratory:  Bilateral air entry present  CVS:  S1, S2 present  GI:  Soft, nondistended, mild tenderness in left lower quadrant, improving  CNS:  Active alert oriented x3  Skin:  No new rash  Musculoskeletal:  No obvious new gross deformity noted    Medications:    Current Facility-Administered Medications:     acetaminophen (TYLENOL) tablet 650 mg, 650 mg, Oral, Q8H PRN, Suly Chavez MD, 650 mg at 09/14/21 0809    albuterol (PROVENTIL HFA,VENTOLIN HFA) inhaler 2 puff, 2 puff, Inhalation, Q6H PRN, Suly Chavez MD, 2 puff at 09/13/21 8399    ALPRAZolam (XANAX) tablet 0 25 mg, 0 25 mg, Oral, BID PRN, Suly Chavez MD, 0 25 mg at 09/15/21 0804    atorvastatin (LIPITOR) tablet 20 mg, 20 mg, Oral, QPM, Suly Chavez MD, 20 mg at 09/14/21 1729    b complex-vitamin C-folic acid (NEPHROCAPS) capsule 1 capsule, 1 capsule, Oral, Daily With Alejandrina Cordova MD, 1 capsule at 09/14/21 1713    brimonidine tartrate 0 2 % ophthalmic solution 1 drop, 1 drop, Both Eyes, BID, Brannon Crespo MD, 1 drop at 09/15/21 0807    carvedilol (COREG) tablet 25 mg, 25 mg, Oral, BID With Meals, Brannon Crespo MD, 25 mg at 09/14/21 1713    cinacalcet (SENSIPAR) tablet 30 mg, 30 mg, Oral, Daily, Brannon Crespo MD, 30 mg at 09/15/21 0806    Diclofenac Sodium (VOLTAREN) 1 % topical gel 2 g, 2 g, Topical, 4x Daily, Brannon Crespo MD, 2 g at 09/14/21 2213    dicyclomine (BENTYL) capsule 10 mg, 10 mg, Oral, 4x Daily (AC & HS), Melita Beckwith PA-C, 10 mg at 09/15/21 0459    insulin glargine (LANTUS) subcutaneous injection 20 Units 0 2 mL, 20 Units, Subcutaneous, HS, Brannon Crespo MD, 20 Units at 09/14/21 2210    insulin lispro (HumaLOG) 100 units/mL subcutaneous injection 1-6 Units, 1-6 Units, Subcutaneous, TID AC, 4 Units at 09/15/21 0809 **AND** Fingerstick Glucose (POCT), , , TID AC, Brannon Crespo MD    insulin lispro (HumaLOG) 100 units/mL subcutaneous injection 1-6 Units, 1-6 Units, Subcutaneous, HS, Brannon Crespo MD, 5 Units at 09/14/21 2210    levothyroxine tablet 50 mcg, 50 mcg, Oral, Daily, Brannon Crespo MD, 50 mcg at 09/15/21 0458    lidocaine (LIDODERM) 5 % patch 1 patch, 1 patch, Topical, Daily, Brannon Crespo MD    loratadine (CLARITIN) tablet 10 mg, 10 mg, Oral, Daily, Brannon Crespo MD, 10 mg at 09/15/21 0805    metroNIDAZOLE (FLAGYL) IVPB (premix) 500 mg 100 mL, 500 mg, Intravenous, Q8H, Brannon Crespo MD, Stopped at 09/15/21 0101    NIFEdipine (PROCARDIA XL) 24 hr tablet 30 mg, 30 mg, Oral, Daily, Toya Garcia MD, 30 mg at 09/14/21 1355    nystatin (MYCOSTATIN) powder, , Topical, BID, Brannon Crespo MD, Given at 09/15/21 0808    ondansetron (ZOFRAN) injection 4 mg, 4 mg, Intravenous, Q6H PRN, Brannon Crespo MD    oxyCODONE-acetaminophen (PERCOCET) 5-325 mg per tablet 1 tablet, 1 tablet, Oral, Q6H PRN, Brannon Crespo MD, 1 tablet at 09/15/21 2771    pantoprazole (PROTONIX) EC tablet 40 mg, 40 mg, Oral, Early Morning, Jaky Aguilar MD, 40 mg at 09/15/21 0458    pregabalin (LYRICA) capsule 50 mg, 50 mg, Oral, Daily, Jaky Aguilar MD, 50 mg at 09/15/21 3682    sertraline (ZOLOFT) tablet 50 mg, 50 mg, Oral, Daily, Jaky Aguilar MD, 50 mg at 09/15/21 0805    sevelamer (RENAGEL) tablet 800 mg, 800 mg, Oral, TID With Meals, Jaky Aguilar MD, 800 mg at 09/15/21 0805    torsemide (DEMADEX) tablet 100 mg, 100 mg, Oral, Q12H, Jaky Aguilar MD, 100 mg at 09/14/21 2216    warfarin (COUMADIN) tablet 9 mg, 9 mg, Oral, Daily (warfarin), Garth Beckwith PA-C    Laboratory Results:  Results from last 7 days   Lab Units 09/15/21  0958 09/14/21  0538 09/13/21 2101 09/13/21 2009   WBC Thousand/uL  --  8 00  --  8 76   HEMOGLOBIN g/dL  --  7 9*  --  8 6*   HEMATOCRIT %  --  24 5*  --  26 4*   PLATELETS Thousands/uL  --  153  --  168   SODIUM mmol/L 131* 133* 134*  --    POTASSIUM mmol/L 5 1 5 2 5 5*  --    CHLORIDE mmol/L 99* 101 102  --    CO2 mmol/L 27 24 25  --    BUN mg/dL 58* 86* 79*  --    CREATININE mg/dL 7 03* 9 89* 9 02*  --    CALCIUM mg/dL 8 0* 8 1* 8 5  --        MRI foot/forefoot toes right wo contrast   Final Result by Jones Perkins MD (09/15 3235)   1  Prominent 1st MTP degenerative change with plantar and medial skin ulcerations in this region including associated subcutaneous edema indicating cellulitis  No abscess  No evidence of osteomyelitis  2   Postoperative changes noted after resection of the 2nd metatarsal head and 4th and 5th toes  Workstation performed: [id number]         XR foot 3+ views RIGHT   Final Result by Karen Romero MD (09/14 2973)      Findings suspicious for focal chronic osteomyelitis along medial aspect of right 1st distal phalanx adjacent to a soft tissue wound  Unchanged linear metallic density in the soft tissues of right 3rd toe plantar aspect              Workstation performed: [id number]         CT abdomen pelvis wo contrast   Final Result by Michelet Keller MD (09/13 2115)      Mild acute sigmoid diverticulitis  No extraluminal free air or abscess formation identified  Remainder of the findings, as described above  The study was marked in [facility] for immediate notification  Workstation performed: [id number]         VAS lower limb arterial duplex, complete bilateral    (Results Pending)       Portions of the record may have been created with voice recognition software  Occasional wrong word or "sound a like" substitutions may have occurred due to the inherent limitations of voice recognition software  Read the chart carefully and recognize, using context, where substitutions have occurred

## 2021-09-15 NOTE — PLAN OF CARE
Problem: METABOLIC, FLUID AND ELECTROLYTES - ADULT  Goal: Electrolytes maintained within normal limits  Description: INTERVENTIONS:  - Monitor labs and assess patient for signs and symptoms of electrolyte imbalances  - Administer electrolyte replacement as ordered  - Monitor response to electrolyte replacements, including repeat lab results as appropriate  - Instruct patient on fluid and nutrition as appropriate  Outcome: Adequate for Discharge  Goal: Fluid balance maintained  Description: INTERVENTIONS:  - Monitor labs   - Monitor I/O and WT  - Instruct patient on fluid and nutrition as appropriate  - Assess for signs & symptoms of volume excess or deficit  Outcome: Adequate for Discharge     Problem: SKIN/TISSUE INTEGRITY - ADULT  Goal: Incision(s), wounds(s) or drain site(s) healing without S/S of infection  Description: INTERVENTIONS  - Assess and document dressing, incision, wound bed, drain sites and surrounding tissue  - Provide patient and family education  Outcome: Adequate for Discharge     Problem: Nutrition/Hydration-ADULT  Goal: Nutrient/Hydration intake appropriate for improving, restoring or maintaining nutritional needs  Description: Monitor and assess patient's nutrition/hydration status for malnutrition  Collaborate with interdisciplinary team and initiate plan and interventions as ordered  Monitor patient's weight and dietary intake as ordered or per policy  Utilize nutrition screening tool and intervene as necessary  Determine patient's food preferences and provide high-protein, high-caloric foods as appropriate       INTERVENTIONS:  - Monitor oral intake, urinary output, labs, and treatment plans  - Assess nutrition and hydration status and recommend course of action  - Evaluate amount of meals eaten  - Assist patient with eating if necessary   - Allow adequate time for meals  - Recommend/ encourage appropriate diets, oral nutritional supplements, and vitamin/mineral supplements  - Order, calculate, and assess calorie counts as needed  - Recommend, monitor, and adjust tube feedings and TPN/PPN based on assessed needs  - Assess need for intravenous fluids  - Provide specific nutrition/hydration education as appropriate  - Include patient/family/caregiver in decisions related to nutrition  Outcome: Adequate for Discharge     Problem: INFECTION - ADULT  Goal: Absence or prevention of progression during hospitalization  Description: INTERVENTIONS:  - Assess and monitor for signs and symptoms of infection  - Monitor lab/diagnostic results  - Monitor all insertion sites, i e  indwelling lines, tubes, and drains  - Monitor endotracheal if appropriate and nasal secretions for changes in amount and color  - [place] appropriate cooling/warming therapies per order  - Administer medications as ordered  - Instruct and encourage patient and family to use good hand hygiene technique  - Identify and instruct in appropriate isolation precautions for identified infection/condition  Outcome: Adequate for Discharge     Problem: SAFETY ADULT  Goal: Patient will remain free of falls  Description: INTERVENTIONS:  - Educate patient/family on patient safety including physical limitations  - Instruct patient to call for assistance with activity   - Consult OT/PT to assist with strengthening/mobility   - Keep Call bell within reach  - Keep bed low and locked with side rails adjusted as appropriate  - Keep care items and personal belongings within reach  - Initiate and maintain comfort rounds  - Make Fall Risk Sign visible to staff  - Apply yellow socks and bracelet for high fall risk patients  - Consider moving patient to room near nurses station  Outcome: Adequate for Discharge     Problem: Knowledge Deficit  Goal: Patient/family/caregiver demonstrates understanding of disease process, treatment plan, medications, and discharge instructions  Description: Complete learning assessment and assess knowledge base   Interventions:  - Provide teaching at level of understanding  - Provide teaching via preferred learning methods  Outcome: Adequate for Discharge     Problem: MOBILITY - ADULT  Goal: Maintain or return to baseline ADL function  Description: INTERVENTIONS:  -  Assess patient's ability to carry out ADLs; assess patient's baseline for ADL function and identify physical deficits which impact ability to perform ADLs (bathing, care of mouth/teeth, toileting, grooming, dressing, etc )  - Assess/evaluate cause of self-care deficits   - Assess range of motion  - Assess patient's mobility; develop plan if impaired  - Assess patient's need for assistive devices and provide as appropriate  - Encourage maximum independence but intervene and supervise when necessary  - Involve family in performance of ADLs  - Assess for home care needs following discharge   - Consider OT consult to assist with ADL evaluation and planning for discharge  - Provide patient education as appropriate  Outcome: Adequate for Discharge  Goal: Maintains/Returns to pre admission functional level  Description: INTERVENTIONS:  - Perform BMAT or MOVE assessment daily    - Set and communicate daily mobility goal to care team and patient/family/caregiver     - Collaborate with rehabilitation services on mobility goals if consulted  - Out of bed for toileting  - Record patient progress and toleration of activity level   Outcome: Adequate for Discharge

## 2021-09-15 NOTE — ASSESSMENT & PLAN NOTE
· Patient presented with progressive LLQ abdominal pain  · CT of abdomen and pelvis: "Mild acute sigmoid diverticulitis    No extraluminal free air or abscess formation identified "  · No sirs/sepsis criteria preset on admissoin   · Tolerating regular diet   · Transition from IV to oral antibiotics at discharge to complete total 7 day course  · GI consult appreciated, will need outpatient follow-up and colonoscopy

## 2021-09-15 NOTE — PROGRESS NOTES
Patient was given prn xanax prior to MRI  She got in the wheel chair then in the [address] said this is too late to got to MRI  MRI made awarel

## 2021-09-19 NOTE — PHYSICIAN ADVISOR
Current patient class: Inpatient  The patient is currently on Hospital Day: 3 at [address] [place] [address]      This patient was originally admitted to the hospital under observation class  After admission the patient was reevaluated and determined to require further hospitalization  The patient was then documented to require at least a 2nd midnight in the hospital  As such the patient was then expected to satisfy the 2 midnight benchmark and was therefore eligible for inpatient admission  After review of the relevant documentation, labs, vital signs and test results, the patient is appropriate for INPATIENT ADMISSION  Admission to the hospital as an inpatient is a complex decision making process which requires the practitioner to consider the patients presenting complaint, history and physical examination and all relevant testing  With this in mind, in this case, the patient was deemed appropriate for INPATIENT ADMISSION  After review of the documentation and testing available at the time of the admission I concur with this clinical determination of medical necessity  Rationale is as follows:    OBS to IP 2MN      The patient was treated for acute sigmoid diverticulitis  She was treated with intravenous antibiotics  She also had a right foot ulcer, diabetic  Medical history is complicated also by end-stage renal disease requiring hemodialysis  Because the patient required a second midnight in the hospital for active management, she was changed to inpatient class which was appropriate      The patients vitals on arrival were   ED Triage Vitals   Temperature Pulse Respirations Blood Pressure SpO2   09/13/21 1710 09/13/21 1710 09/13/21 1710 09/13/21 1710 09/13/21 1710   97 9 °F (36 6 °C) 77 18 136/77 96 %      Temp Source Heart Rate Source Patient Position - Orthostatic VS BP Location FiO2 (%)   09/13/21 1710 09/13/21 2123 09/13/21 2123 09/13/21 2123 --   Tympanic Monitor TOT HYSTERECT UTERUS 250 GRAM OR LESS N/A 2/16/2016    Procedure: HYSTERECTOMY LAPAROSCOPIC TOTAL ([address]), with bilateral salpingectomy;  Surgeon: Aurora Vigil DO;  Location: [facility];  Service: Gynecology    THROMBECTOMY / ARTERIOVENOUS GRAFT REVISION      TOE SURGERY Right     removal of the 4th toe       The patient was admitted to the hospital at 12:26 PM on 9/14/21 for the following diagnosis:  Diverticulitis [K57 92]  Groin pain [R10 30]  ESRD on hemodialysis ([address] ) [N18 6, Z99 2]  Right foot ulcer, with fat layer exposed ([facility] ) [L97 512]    Consults have been placed to:   IP CONSULT TO GASTROENTEROLOGY  IP CONSULT TO NEPHROLOGY  IP CONSULT TO PODIATRY    Vitals:    09/15/21 1200 09/15/21 1230 09/15/21 1245 09/15/21 1534   BP: 94/54 96/56 111/69 100/63   BP Location:       Pulse: 80 70 77    Resp: 16 16 16    Temp:    98 °F (36 7 °C)   TempSrc:       SpO2:       Weight:           Most recent labs:    No results for input(s): WBC, HGB, HCT, PLT, K, NA, CALCIUM, BUN, CREATININE, LIPASE, AMYLASE, INR, TROPONINI, CKTOTAL, AST, ALT, ALKPHOS, BILITOT in the last 72 hours  Scheduled Meds:  Continuous Infusions:No current facility-administered medications for this encounter  PRN Meds:      Surgical procedures (if appropriate):

## 2021-09-21 NOTE — PROGRESS NOTES
This patient was seen on 9/22/2021  My role is Foot , Ankle, and Wound Specialist    SUBJECTIVE    Chief Complaint:  Foot ulcer     Patient ID: Phoebe Murrieta is a 49 y o  female  Pt arrives for wound care follow up  Presents with dressing intact  Pt states she was d/c'd from hospital last week  The following portions of the patient's history were reviewed and updated as appropriate: allergies, current medications, past family history, past medical history, past social history, past surgical history and problem list     Review of Systems   Constitutional: Negative  Skin: Positive for wound  Neurological: Positive for numbness  Psychiatric/Behavioral: The patient is nervous/anxious  OBJECTIVE      Wt 127 kg (279 lb)   LMP 02/12/2016 (Exact Date)   BMI 45 03 kg/m²     Foot/Ankle Musculoskeletal Exam    General      Neurological: alert       Physical Exam  Vitals and nursing note reviewed  Constitutional:       General: She is not in acute distress  Appearance: Normal appearance  She is obese  She is not ill-appearing, toxic-appearing or diaphoretic  Neurological:      Mental Status: She is alert             Wound # 1  Location: right dorsal foot  Length 1 2cm: Width 1 4cm: Depth 0 2cm:   Deepest Tissue Noted in Base: SQ  Probe to Bone?: no  Peripheral Skin Description: intact  Granulation: 60% Fibrotic Tissue: 40% Necrotic Tissue: 0%  Drainage Amount: minimal  Signs of Infection: no  Total debrided 1 68 square centimeters  ASSESSMENT     Diagnoses and all orders for this visit:    Diabetic polyneuropathy associated with type 2 diabetes mellitus ([address] )    Right foot ulcer, with fat layer exposed ([address] )         Problem List Items Addressed This Visit        Endocrine    Diabetic polyneuropathy associated with type 2 diabetes mellitus ([address] ) - Primary       Other    Right foot ulcer, with fat layer exposed ([address] ) (Chronic)              PLAN  DEBRIDEMENT NOTE    A formal timeout including patient identification, laterality and existing allergies using [facility] protocol was conducted  Procedure Performed: Debridement of subcutaneous tissue- >20 sq cm (20190)  Under aseptic technique, the wound was thoroughly explored and bluntly probed for undermining, deep sinus tracts and bone involvement  Sterile instrumentation including scalpel, forceps and curette used to excise the clearly delineated devitalized subcutaneous tissue (fibrotic tissue and necrotic non-viable portions of fat) exposing viable tissue  After debridement, bleeding in the wound bed base was noted indicated viable subcutaneous tissue had been exposed  Bleeding controlled with gentle pressure  Patient tolerated debridement without complications  The wound was dressed  Wound dressing technique and frequency described to patient  I am to be called if any signs of infection develop such as erythema, increased wound size or significant change in appearance of wound, odor, pain, increased or change in color of drainage, swelling, fever, chills, nightsweats  I reviewed these signs with the patient  I recommended limiting WB to bathroom priveleges and meal table and to use any prescribed offloading devices in an effort to allow proper rest and healing of the wounded area  I explained that good wound care and compliance are necessary to allow healing and to prevent toe loss or limb loss  Collagenase applied, gauze and tape  To be changed daily  Orders faxed to [facility]  Return in 1 week   Surgical shoe modified by Dr Dilan Mendez

## 2021-09-22 ENCOUNTER — PROCEDURE VISIT (OUTPATIENT)
Dept: PODIATRY | Facility: CLINIC | Age: 56
End: 2021-09-22
Payer: MEDICARE

## 2021-09-22 VITALS — WEIGHT: 279 LBS | BODY MASS INDEX: 45.03 KG/M2

## 2021-09-22 DIAGNOSIS — E11.42 DIABETIC POLYNEUROPATHY ASSOCIATED WITH TYPE 2 DIABETES MELLITUS (HCC): Primary | ICD-10-CM

## 2021-09-22 DIAGNOSIS — L97.512 RIGHT FOOT ULCER, WITH FAT LAYER EXPOSED (HCC): Chronic | ICD-10-CM

## 2021-09-22 PROCEDURE — 99213 OFFICE O/P EST LOW 20 MIN: CPT | Performed by: PODIATRIST

## 2021-09-22 PROCEDURE — 11042 DBRDMT SUBQ TIS 1ST 20SQCM/<: CPT | Performed by: PODIATRIST

## 2021-09-22 NOTE — TELEPHONE ENCOUNTER
Good morning  Your patient is being considered for an epidural steroid injection for the management of their pain by Dr Portia Hayward at [facility]  This patient is currently taking Coumadin as per your orders  Per the society of Regional Anest[employer] Guidelines on neuraxial procedures, this medication would need to be held for 5 days pre procedure  We would advise the patient to resume their Coumadin post procedure  In order for the patient to have the procedure, we need your consent to proceed  Thank you for your consideration  Please advise

## 2021-09-23 ENCOUNTER — TELEPHONE (OUTPATIENT)
Dept: PODIATRY | Facility: CLINIC | Age: 56
End: 2021-09-23

## 2021-09-23 NOTE — TELEPHONE ENCOUNTER
Regis Barber from [place] [address] [facility] called, Brenda Dillon is supposed to have daily wound care  She is at dialysis today  The visiting nurse will go tomorrow to do the wound care

## 2021-09-24 NOTE — TELEPHONE ENCOUNTER
Rn s/w pt and was advised that Dr Anupam Becker prescribes pt Coumadin  [employer] hold request sent to Dr Anupam Becker office fax # [phone], verified by Lj Sauceda at office  RN advised pt that once hold request obtained would contact her pt schedule procedure

## 2021-09-27 NOTE — PROGRESS NOTES
This patient was seen on 9/29/2021  My role is Foot , Ankle, and Wound Specialist    SUBJECTIVE    Chief Complaint:  DFU     Patient ID: Owen Leo is a 49 y o  female  Pt arrives for wound care follow up  Presents with dressing intact  Pt states she was seen in the ER last night but was not admitted and no abx ordered  She had some xrays which were read as negative  Pt reports having chills, nausea/vomiting  Pt reports increase pain in the foot, difficulty ambulating due to the pain  The following portions of the patient's history were reviewed and updated as appropriate: allergies, current medications, past family history, past medical history, past social history, past surgical history and problem list     Review of Systems   Constitutional: Negative  Musculoskeletal: Positive for arthralgias and gait problem  Skin: Positive for wound  Neurological: Positive for numbness  Psychiatric/Behavioral: The patient is nervous/anxious  OBJECTIVE      /85   Pulse 84   Temp (!) 97 3 °F (36 3 °C)   Ht 5' 7" (1 702 m)   LMP 02/12/2016 (Exact Date)   BMI 43 70 kg/m²     Foot/Ankle Musculoskeletal Exam    General      Neurological: alert       Physical Exam  Vitals and nursing note reviewed  Constitutional:       General: She is not in acute distress  Appearance: Normal appearance  She is obese  She is not ill-appearing, toxic-appearing or diaphoretic  Neurological:      Mental Status: She is alert             Wound # 1  Location: right dorsal foot  Length 1 2cm: Width 1 2cm: Depth 0 2cm:   Deepest Tissue Noted in Base: SQ  Probe to Bone?: no  Peripheral Skin Description: intact  Granulation: 50% Fibrotic Tissue: 50% Necrotic Tissue: 0%  Drainage Amount: minimal  Signs of Infection: no  Total debrided 0square centimeters      ASSESSMENT     Diagnoses and all orders for this visit:    Right foot ulcer, with fat layer exposed ([address] )  -     Transfer to other facility    Other orders  -     gabapentin (NEURONTIN) 100 mg capsule         Problem List Items Addressed This Visit        Other    Right foot ulcer, with fat layer exposed ([facility] [address] ) - Primary (Chronic)    Relevant Orders    Transfer to other facility            PLAN    I'm suspicious of osteomyelitis or some other occult infection  She is having systemic symptoms and more pain  I had a long discussion with her about hospital admission and she relented and will be amenable to the admission  I fee we should admit her for intravenous antibiotics and get her off of this foot until we can ascertain if an underlying bone infection is present  I will set her up for a direct admission  I will instruct the podiatry residents to order an MRI and get a medcine consult to manager her comorbid conditions       Silver alginate applied , gauze, abd and kerlex

## 2021-09-27 NOTE — TELEPHONE ENCOUNTER
RN s/w pt  Pt advised that coumadin hold request approval received  Pt scheduled for 10/13 at 950  Pt advised to hold Coumadin from 10/8- 10/13(LD coumadin 10/7), and that pt would be advised when to resume Coumadin post procedure  Pt advised to have PT/INR done before 8am on 10/13  Pt advised that she will need a , wear loose comfortable clothing, Npo 1 hour prior to procedure  Pt advised to avoid vaccines 2 weeks prior to and after procedure  Pt advised that if she becomes ill or is on an antibiotic to call as the procedure would need to be rescheduled  Pt advised that she would need to wear a mask for the entire procedure  Pt is a diabetic but does not have a CGM  JW- can you please order PT/INR for 10/13?

## 2021-09-28 ENCOUNTER — APPOINTMENT (EMERGENCY)
Dept: RADIOLOGY | Facility: HOSPITAL | Age: 56
DRG: 622 | End: 2021-09-28
Payer: MEDICARE

## 2021-09-28 ENCOUNTER — TELEPHONE (OUTPATIENT)
Dept: PODIATRY | Facility: CLINIC | Age: 56
End: 2021-09-28

## 2021-09-28 ENCOUNTER — HOSPITAL ENCOUNTER (EMERGENCY)
Facility: HOSPITAL | Age: 56
Discharge: HOME | DRG: 622 | End: 2021-09-28
Attending: EMERGENCY MEDICINE
Payer: MEDICARE

## 2021-09-28 VITALS
DIASTOLIC BLOOD PRESSURE: 58 MMHG | RESPIRATION RATE: 20 BRPM | OXYGEN SATURATION: 97 % | TEMPERATURE: 98.1 F | HEART RATE: 90 BPM | SYSTOLIC BLOOD PRESSURE: 124 MMHG

## 2021-09-28 DIAGNOSIS — L97.509 DIABETIC FOOT ULCER (HCC): Primary | ICD-10-CM

## 2021-09-28 DIAGNOSIS — E11.621 DIABETIC FOOT ULCER (HCC): Primary | ICD-10-CM

## 2021-09-28 LAB
ANION GAP SERPL CALCULATED.3IONS-SCNC: 5 MMOL/L (ref 4–13)
BASOPHILS # BLD AUTO: 0.05 THOUSANDS/ΜL (ref 0–0.1)
BASOPHILS NFR BLD AUTO: 1 % (ref 0–1)
BUN SERPL-MCNC: 32 MG/DL (ref 5–25)
CALCIUM SERPL-MCNC: 8.8 MG/DL (ref 8.3–10.1)
CHLORIDE SERPL-SCNC: 99 MMOL/L (ref 100–108)
CO2 SERPL-SCNC: 29 MMOL/L (ref 21–32)
CREAT SERPL-MCNC: 6.17 MG/DL (ref 0.6–1.3)
EOSINOPHIL # BLD AUTO: 0.18 THOUSAND/ΜL (ref 0–0.61)
EOSINOPHIL NFR BLD AUTO: 2 % (ref 0–6)
ERYTHROCYTE [DISTWIDTH] IN BLOOD BY AUTOMATED COUNT: 13.7 % (ref 11.6–15.1)
GFR SERPL CREATININE-BSD FRML MDRD: 7 ML/MIN/1.73SQ M
GLUCOSE SERPL-MCNC: 241 MG/DL (ref 65–140)
HCT VFR BLD AUTO: 28 % (ref 34.8–46.1)
HGB BLD-MCNC: 9.2 G/DL (ref 11.5–15.4)
IMM GRANULOCYTES # BLD AUTO: 0.05 THOUSAND/UL (ref 0–0.2)
IMM GRANULOCYTES NFR BLD AUTO: 1 % (ref 0–2)
LACTATE SERPL-SCNC: 1.8 MMOL/L (ref 0.5–2)
LYMPHOCYTES # BLD AUTO: 0.94 THOUSANDS/ΜL (ref 0.6–4.47)
LYMPHOCYTES NFR BLD AUTO: 10 % (ref 14–44)
MCH RBC QN AUTO: 31.6 PG (ref 26.8–34.3)
MCHC RBC AUTO-ENTMCNC: 32.9 G/DL (ref 31.4–37.4)
MCV RBC AUTO: 96 FL (ref 82–98)
MONOCYTES # BLD AUTO: 0.74 THOUSAND/ΜL (ref 0.17–1.22)
MONOCYTES NFR BLD AUTO: 8 % (ref 4–12)
NEUTROPHILS # BLD AUTO: 7.36 THOUSANDS/ΜL (ref 1.85–7.62)
NEUTS SEG NFR BLD AUTO: 78 % (ref 43–75)
NRBC BLD AUTO-RTO: 0 /100 WBCS
PLATELET # BLD AUTO: 182 THOUSANDS/UL (ref 149–390)
PMV BLD AUTO: 10.9 FL (ref 8.9–12.7)
POTASSIUM SERPL-SCNC: 4.5 MMOL/L (ref 3.5–5.3)
RBC # BLD AUTO: 2.91 MILLION/UL (ref 3.81–5.12)
SODIUM SERPL-SCNC: 133 MMOL/L (ref 136–145)
WBC # BLD AUTO: 9.32 THOUSAND/UL (ref 4.31–10.16)

## 2021-09-28 PROCEDURE — 99214 OFFICE O/P EST MOD 30 MIN: CPT | Performed by: PODIATRIST

## 2021-09-28 PROCEDURE — 83605 ASSAY OF LACTIC ACID: CPT

## 2021-09-28 PROCEDURE — 36415 COLL VENOUS BLD VENIPUNCTURE: CPT

## 2021-09-28 PROCEDURE — 96375 TX/PRO/DX INJ NEW DRUG ADDON: CPT

## 2021-09-28 PROCEDURE — 85025 COMPLETE CBC W/AUTO DIFF WBC: CPT

## 2021-09-28 PROCEDURE — 99285 EMERGENCY DEPT VISIT HI MDM: CPT | Performed by: EMERGENCY MEDICINE

## 2021-09-28 PROCEDURE — 80048 BASIC METABOLIC PNL TOTAL CA: CPT

## 2021-09-28 PROCEDURE — 99283 EMERGENCY DEPT VISIT LOW MDM: CPT

## 2021-09-28 PROCEDURE — 64450 NJX AA&/STRD OTHER PN/BRANCH: CPT | Performed by: PODIATRIST

## 2021-09-28 PROCEDURE — 96374 THER/PROPH/DIAG INJ IV PUSH: CPT

## 2021-09-28 PROCEDURE — 73630 X-RAY EXAM OF FOOT: CPT

## 2021-09-28 RX ORDER — HYDROMORPHONE HCL IN WATER/PF 6 MG/30 ML
0.2 PATIENT CONTROLLED ANALGESIA SYRINGE INTRAVENOUS ONCE
Status: COMPLETED | OUTPATIENT
Start: 2021-09-28 | End: 2021-09-28

## 2021-09-28 RX ORDER — HYDROMORPHONE HCL/PF 1 MG/ML
0.5 SYRINGE (ML) INJECTION ONCE
Status: COMPLETED | OUTPATIENT
Start: 2021-09-28 | End: 2021-09-28

## 2021-09-28 RX ORDER — LIDOCAINE HYDROCHLORIDE 10 MG/ML
5 INJECTION, SOLUTION EPIDURAL; INFILTRATION; INTRACAUDAL; PERINEURAL ONCE
Status: COMPLETED | OUTPATIENT
Start: 2021-09-28 | End: 2021-09-28

## 2021-09-28 RX ORDER — BUPIVACAINE HYDROCHLORIDE 5 MG/ML
5 INJECTION, SOLUTION EPIDURAL; INTRACAUDAL ONCE
Status: COMPLETED | OUTPATIENT
Start: 2021-09-28 | End: 2021-09-28

## 2021-09-28 RX ORDER — ONDANSETRON 2 MG/ML
4 INJECTION INTRAMUSCULAR; INTRAVENOUS ONCE
Status: COMPLETED | OUTPATIENT
Start: 2021-09-28 | End: 2021-09-28

## 2021-09-28 RX ADMIN — HYDROMORPHONE HYDROCHLORIDE 0.2 MG: 0.2 INJECTION, SOLUTION INTRAMUSCULAR; INTRAVENOUS; SUBCUTANEOUS at 15:52

## 2021-09-28 RX ADMIN — LIDOCAINE HYDROCHLORIDE 5 ML: 10 INJECTION, SOLUTION EPIDURAL; INFILTRATION; INTRACAUDAL at 17:24

## 2021-09-28 RX ADMIN — BUPIVACAINE HYDROCHLORIDE 5 ML: 5 INJECTION, SOLUTION EPIDURAL; INTRACAUDAL at 17:24

## 2021-09-28 RX ADMIN — HYDROMORPHONE HYDROCHLORIDE 0.5 MG: 1 INJECTION, SOLUTION INTRAMUSCULAR; INTRAVENOUS; SUBCUTANEOUS at 16:37

## 2021-09-28 RX ADMIN — ONDANSETRON 4 MG: 2 INJECTION INTRAMUSCULAR; INTRAVENOUS at 15:51

## 2021-09-28 NOTE — TELEPHONE ENCOUNTER
Xavier Just called, she had to leave Dialysis today because she is in such excruciating pain  Her foot is red and her leg is hot  She is scheduled to come in tomorrow morning for an appointment  She is very concerned

## 2021-09-28 NOTE — TELEPHONE ENCOUNTER
Returned pt's phone call  Pt states the foot is red and leg is warm to touch and she is having increased pain  Instructed pt to go to ER to be evaluated  Pt states she will go to [facility] for evaluation

## 2021-09-29 ENCOUNTER — HOSPITAL ENCOUNTER (INPATIENT)
Facility: HOSPITAL | Age: 56
LOS: 19 days | Discharge: HOME HEALTH | DRG: 622 | End: 2021-10-18
Attending: PODIATRIST | Admitting: PODIATRIST
Payer: MEDICARE

## 2021-09-29 ENCOUNTER — PROCEDURE VISIT (OUTPATIENT)
Dept: PODIATRY | Facility: CLINIC | Age: 56
End: 2021-09-29
Payer: MEDICARE

## 2021-09-29 VITALS
BODY MASS INDEX: 43.7 KG/M2 | DIASTOLIC BLOOD PRESSURE: 85 MMHG | HEART RATE: 84 BPM | HEIGHT: 67 IN | TEMPERATURE: 97.3 F | SYSTOLIC BLOOD PRESSURE: 145 MMHG

## 2021-09-29 DIAGNOSIS — K57.32 SIGMOID DIVERTICULITIS: ICD-10-CM

## 2021-09-29 DIAGNOSIS — L03.115 CELLULITIS OF RIGHT LOWER EXTREMITY: ICD-10-CM

## 2021-09-29 DIAGNOSIS — Z99.2 ESRD ON HEMODIALYSIS (HCC): Primary | Chronic | ICD-10-CM

## 2021-09-29 DIAGNOSIS — N18.6 END STAGE RENAL DISEASE (HCC): ICD-10-CM

## 2021-09-29 DIAGNOSIS — Z79.4 TYPE 2 DIABETES MELLITUS WITH DIABETIC POLYNEUROPATHY, WITH LONG-TERM CURRENT USE OF INSULIN (HCC): ICD-10-CM

## 2021-09-29 DIAGNOSIS — L97.512 RIGHT FOOT ULCER, WITH FAT LAYER EXPOSED (HCC): ICD-10-CM

## 2021-09-29 DIAGNOSIS — I10 ESSENTIAL HYPERTENSION: ICD-10-CM

## 2021-09-29 DIAGNOSIS — E11.42 TYPE 2 DIABETES MELLITUS WITH DIABETIC POLYNEUROPATHY, WITH LONG-TERM CURRENT USE OF INSULIN (HCC): ICD-10-CM

## 2021-09-29 DIAGNOSIS — R11.14 BILIOUS VOMITING WITH NAUSEA: ICD-10-CM

## 2021-09-29 DIAGNOSIS — L97.512 RIGHT FOOT ULCER, WITH FAT LAYER EXPOSED (HCC): Primary | ICD-10-CM

## 2021-09-29 DIAGNOSIS — N18.6 ESRD ON HEMODIALYSIS (HCC): Primary | Chronic | ICD-10-CM

## 2021-09-29 PROBLEM — L03.90 CELLULITIS: Status: ACTIVE | Noted: 2021-09-29

## 2021-09-29 LAB
ALBUMIN SERPL BCP-MCNC: 2.9 G/DL (ref 3.5–5)
ALP SERPL-CCNC: 181 U/L (ref 46–116)
ALT SERPL W P-5'-P-CCNC: 24 U/L (ref 12–78)
ANION GAP SERPL CALCULATED.3IONS-SCNC: 7 MMOL/L (ref 4–13)
AST SERPL W P-5'-P-CCNC: 15 U/L (ref 5–45)
BASOPHILS # BLD AUTO: 0.04 THOUSANDS/ΜL (ref 0–0.1)
BASOPHILS NFR BLD AUTO: 0 % (ref 0–1)
BILIRUB SERPL-MCNC: 0.38 MG/DL (ref 0.2–1)
BUN SERPL-MCNC: 60 MG/DL (ref 5–25)
CALCIUM ALBUM COR SERPL-MCNC: 9.1 MG/DL (ref 8.3–10.1)
CALCIUM SERPL-MCNC: 8.2 MG/DL (ref 8.3–10.1)
CHLORIDE SERPL-SCNC: 100 MMOL/L (ref 100–108)
CO2 SERPL-SCNC: 25 MMOL/L (ref 21–32)
CREAT SERPL-MCNC: 8.31 MG/DL (ref 0.6–1.3)
EOSINOPHIL # BLD AUTO: 0.08 THOUSAND/ΜL (ref 0–0.61)
EOSINOPHIL NFR BLD AUTO: 1 % (ref 0–6)
ERYTHROCYTE [DISTWIDTH] IN BLOOD BY AUTOMATED COUNT: 13.9 % (ref 11.6–15.1)
GFR SERPL CREATININE-BSD FRML MDRD: 5 ML/MIN/1.73SQ M
GLUCOSE SERPL-MCNC: 198 MG/DL (ref 65–140)
GLUCOSE SERPL-MCNC: 203 MG/DL (ref 65–140)
GLUCOSE SERPL-MCNC: 390 MG/DL (ref 65–140)
HCT VFR BLD AUTO: 27.6 % (ref 34.8–46.1)
HGB BLD-MCNC: 8.9 G/DL (ref 11.5–15.4)
IMM GRANULOCYTES # BLD AUTO: 0.06 THOUSAND/UL (ref 0–0.2)
IMM GRANULOCYTES NFR BLD AUTO: 1 % (ref 0–2)
INR PPP: 2.41 (ref 0.84–1.19)
LYMPHOCYTES # BLD AUTO: 1.24 THOUSANDS/ΜL (ref 0.6–4.47)
LYMPHOCYTES NFR BLD AUTO: 13 % (ref 14–44)
MCH RBC QN AUTO: 31 PG (ref 26.8–34.3)
MCHC RBC AUTO-ENTMCNC: 32.2 G/DL (ref 31.4–37.4)
MCV RBC AUTO: 96 FL (ref 82–98)
MONOCYTES # BLD AUTO: 0.91 THOUSAND/ΜL (ref 0.17–1.22)
MONOCYTES NFR BLD AUTO: 10 % (ref 4–12)
NEUTROPHILS # BLD AUTO: 6.89 THOUSANDS/ΜL (ref 1.85–7.62)
NEUTS SEG NFR BLD AUTO: 75 % (ref 43–75)
NRBC BLD AUTO-RTO: 0 /100 WBCS
PLATELET # BLD AUTO: 180 THOUSANDS/UL (ref 149–390)
PMV BLD AUTO: 11.2 FL (ref 8.9–12.7)
POTASSIUM SERPL-SCNC: 4.9 MMOL/L (ref 3.5–5.3)
PROT SERPL-MCNC: 7.1 G/DL (ref 6.4–8.2)
PROTHROMBIN TIME: 25.1 SECONDS (ref 11.6–14.5)
RBC # BLD AUTO: 2.87 MILLION/UL (ref 3.81–5.12)
SODIUM SERPL-SCNC: 132 MMOL/L (ref 136–145)
URATE SERPL-MCNC: 5 MG/DL (ref 2–6.8)
WBC # BLD AUTO: 9.22 THOUSAND/UL (ref 4.31–10.16)

## 2021-09-29 PROCEDURE — 80053 COMPREHEN METABOLIC PANEL: CPT

## 2021-09-29 PROCEDURE — 85025 COMPLETE CBC W/AUTO DIFF WBC: CPT

## 2021-09-29 PROCEDURE — 99215 OFFICE O/P EST HI 40 MIN: CPT | Performed by: PODIATRIST

## 2021-09-29 PROCEDURE — 99223 1ST HOSP IP/OBS HIGH 75: CPT | Performed by: PODIATRIST

## 2021-09-29 PROCEDURE — 82948 REAGENT STRIP/BLOOD GLUCOSE: CPT

## 2021-09-29 PROCEDURE — 85610 PROTHROMBIN TIME: CPT

## 2021-09-29 PROCEDURE — 84550 ASSAY OF BLOOD/URIC ACID: CPT

## 2021-09-29 RX ORDER — WARFARIN SODIUM 3 MG/1
9 TABLET ORAL
Status: DISCONTINUED | OUTPATIENT
Start: 2021-09-29 | End: 2021-09-29

## 2021-09-29 RX ORDER — CEFAZOLIN SODIUM 1 G/50ML
1000 SOLUTION INTRAVENOUS EVERY 12 HOURS
Status: DISCONTINUED | OUTPATIENT
Start: 2021-09-29 | End: 2021-09-29

## 2021-09-29 RX ORDER — TORSEMIDE 20 MG/1
100 TABLET ORAL EVERY 12 HOURS
Status: DISCONTINUED | OUTPATIENT
Start: 2021-09-29 | End: 2021-10-18 | Stop reason: HOSPADM

## 2021-09-29 RX ORDER — SODIUM POLYSTYRENE SULFONATE 15 G/60ML
15 SUSPENSION ORAL; RECTAL DAILY PRN
Status: DISCONTINUED | OUTPATIENT
Start: 2021-09-29 | End: 2021-09-29

## 2021-09-29 RX ORDER — NIFEDIPINE 30 MG/1
30 TABLET, EXTENDED RELEASE ORAL DAILY
Status: DISCONTINUED | OUTPATIENT
Start: 2021-09-29 | End: 2021-09-30

## 2021-09-29 RX ORDER — WARFARIN SODIUM 3 MG/1
9 TABLET ORAL
Status: DISCONTINUED | OUTPATIENT
Start: 2021-09-29 | End: 2021-10-01

## 2021-09-29 RX ORDER — ACETAMINOPHEN 325 MG/1
650 TABLET ORAL EVERY 6 HOURS PRN
Status: DISCONTINUED | OUTPATIENT
Start: 2021-09-29 | End: 2021-10-18 | Stop reason: HOSPADM

## 2021-09-29 RX ORDER — OXYCODONE HYDROCHLORIDE AND ACETAMINOPHEN 5; 325 MG/1; MG/1
1 TABLET ORAL EVERY 4 HOURS PRN
Status: DISCONTINUED | OUTPATIENT
Start: 2021-09-29 | End: 2021-10-08

## 2021-09-29 RX ORDER — NYSTATIN 100000 [USP'U]/G
POWDER TOPICAL 2 TIMES DAILY
Status: DISCONTINUED | OUTPATIENT
Start: 2021-09-29 | End: 2021-10-18 | Stop reason: HOSPADM

## 2021-09-29 RX ORDER — PREGABALIN 50 MG/1
50 CAPSULE ORAL DAILY
Status: DISCONTINUED | OUTPATIENT
Start: 2021-09-29 | End: 2021-09-29

## 2021-09-29 RX ORDER — HYDROMORPHONE HCL/PF 1 MG/ML
0.5 SYRINGE (ML) INJECTION
Status: DISCONTINUED | OUTPATIENT
Start: 2021-09-29 | End: 2021-10-18 | Stop reason: HOSPADM

## 2021-09-29 RX ORDER — ALPRAZOLAM 0.25 MG/1
0.25 TABLET ORAL
Status: DISCONTINUED | OUTPATIENT
Start: 2021-09-29 | End: 2021-10-18 | Stop reason: HOSPADM

## 2021-09-29 RX ORDER — ONDANSETRON HYDROCHLORIDE 4 MG/5ML
4 SOLUTION ORAL EVERY 6 HOURS PRN
Status: DISCONTINUED | OUTPATIENT
Start: 2021-09-29 | End: 2021-10-18 | Stop reason: HOSPADM

## 2021-09-29 RX ORDER — LEVOTHYROXINE SODIUM 0.05 MG/1
50 TABLET ORAL
Status: DISCONTINUED | OUTPATIENT
Start: 2021-09-30 | End: 2021-10-18 | Stop reason: HOSPADM

## 2021-09-29 RX ORDER — ATORVASTATIN CALCIUM 20 MG/1
20 TABLET, FILM COATED ORAL EVERY EVENING
Status: DISCONTINUED | OUTPATIENT
Start: 2021-09-29 | End: 2021-10-18 | Stop reason: HOSPADM

## 2021-09-29 RX ORDER — PREDNISOLONE ACETATE 10 MG/ML
1 SUSPENSION/ DROPS OPHTHALMIC 4 TIMES DAILY
Status: DISCONTINUED | OUTPATIENT
Start: 2021-09-29 | End: 2021-10-18 | Stop reason: HOSPADM

## 2021-09-29 RX ORDER — GABAPENTIN 300 MG/1
300 CAPSULE ORAL DAILY
Status: DISCONTINUED | OUTPATIENT
Start: 2021-09-29 | End: 2021-10-18 | Stop reason: HOSPADM

## 2021-09-29 RX ORDER — OXYCODONE HCL 5 MG/5 ML
10 SOLUTION, ORAL ORAL EVERY 4 HOURS PRN
Status: DISCONTINUED | OUTPATIENT
Start: 2021-09-29 | End: 2021-10-10

## 2021-09-29 RX ORDER — DIPHENHYDRAMINE HCL 25 MG
25 TABLET ORAL
Status: DISCONTINUED | OUTPATIENT
Start: 2021-09-29 | End: 2021-10-18 | Stop reason: HOSPADM

## 2021-09-29 RX ORDER — UREA/ALLANTOIN/VIT E ACETATE 25 %
CREAM (GRAM) TOPICAL ONCE
Status: DISCONTINUED | OUTPATIENT
Start: 2021-09-29 | End: 2021-09-29

## 2021-09-29 RX ORDER — CINACALCET 30 MG/1
30 TABLET, FILM COATED ORAL DAILY
Status: DISCONTINUED | OUTPATIENT
Start: 2021-09-29 | End: 2021-10-18 | Stop reason: HOSPADM

## 2021-09-29 RX ORDER — CALCIUM CARBONATE 1250 MG/5ML
625 SUSPENSION ORAL 2 TIMES DAILY WITH MEALS
Status: DISCONTINUED | OUTPATIENT
Start: 2021-09-29 | End: 2021-10-18 | Stop reason: HOSPADM

## 2021-09-29 RX ORDER — LORATADINE 10 MG/1
10 TABLET ORAL DAILY
Status: DISCONTINUED | OUTPATIENT
Start: 2021-09-29 | End: 2021-10-18 | Stop reason: HOSPADM

## 2021-09-29 RX ORDER — ALPRAZOLAM 0.25 MG/1
0.25 TABLET ORAL 2 TIMES DAILY PRN
Status: DISCONTINUED | OUTPATIENT
Start: 2021-09-29 | End: 2021-10-18 | Stop reason: HOSPADM

## 2021-09-29 RX ORDER — DOCUSATE SODIUM 100 MG/1
100 CAPSULE, LIQUID FILLED ORAL 2 TIMES DAILY
Status: DISCONTINUED | OUTPATIENT
Start: 2021-09-29 | End: 2021-10-18 | Stop reason: HOSPADM

## 2021-09-29 RX ORDER — METHOCARBAMOL 500 MG/1
500 TABLET, FILM COATED ORAL EVERY 6 HOURS SCHEDULED
Status: DISCONTINUED | OUTPATIENT
Start: 2021-09-29 | End: 2021-10-18 | Stop reason: HOSPADM

## 2021-09-29 RX ORDER — SEVELAMER HYDROCHLORIDE 800 MG/1
800 TABLET, FILM COATED ORAL
Status: DISCONTINUED | OUTPATIENT
Start: 2021-09-29 | End: 2021-09-29

## 2021-09-29 RX ORDER — SEVELAMER HYDROCHLORIDE 800 MG/1
800 TABLET, FILM COATED ORAL
Status: DISCONTINUED | OUTPATIENT
Start: 2021-09-29 | End: 2021-10-18 | Stop reason: HOSPADM

## 2021-09-29 RX ORDER — PANTOPRAZOLE SODIUM 40 MG/1
40 TABLET, DELAYED RELEASE ORAL
Status: DISCONTINUED | OUTPATIENT
Start: 2021-09-30 | End: 2021-10-18 | Stop reason: HOSPADM

## 2021-09-29 RX ORDER — SERTRALINE HYDROCHLORIDE 25 MG/1
50 TABLET, FILM COATED ORAL DAILY
Status: DISCONTINUED | OUTPATIENT
Start: 2021-09-29 | End: 2021-10-18 | Stop reason: HOSPADM

## 2021-09-29 RX ORDER — SENNOSIDES 8.6 MG
2 TABLET ORAL
Status: DISCONTINUED | OUTPATIENT
Start: 2021-09-29 | End: 2021-10-18 | Stop reason: HOSPADM

## 2021-09-29 RX ORDER — ATROPINE SULFATE 10 MG/ML
1 SOLUTION/ DROPS OPHTHALMIC
Status: DISCONTINUED | OUTPATIENT
Start: 2021-09-29 | End: 2021-10-18 | Stop reason: HOSPADM

## 2021-09-29 RX ORDER — ALBUTEROL SULFATE 90 UG/1
2 AEROSOL, METERED RESPIRATORY (INHALATION) EVERY 6 HOURS PRN
Status: DISCONTINUED | OUTPATIENT
Start: 2021-09-29 | End: 2021-10-18 | Stop reason: HOSPADM

## 2021-09-29 RX ORDER — DICYCLOMINE HYDROCHLORIDE 10 MG/1
10 CAPSULE ORAL
Status: DISCONTINUED | OUTPATIENT
Start: 2021-09-29 | End: 2021-10-18 | Stop reason: HOSPADM

## 2021-09-29 RX ORDER — BENZONATATE 100 MG/1
100 CAPSULE ORAL 3 TIMES DAILY PRN
Status: DISCONTINUED | OUTPATIENT
Start: 2021-09-29 | End: 2021-10-18 | Stop reason: HOSPADM

## 2021-09-29 RX ORDER — WARFARIN SODIUM 3 MG/1
9 TABLET ORAL
Status: DISCONTINUED | OUTPATIENT
Start: 2021-09-30 | End: 2021-09-29

## 2021-09-29 RX ORDER — ECHINACEA PURPUREA EXTRACT 125 MG
1 TABLET ORAL 3 TIMES DAILY PRN
Status: DISCONTINUED | OUTPATIENT
Start: 2021-09-29 | End: 2021-10-18 | Stop reason: HOSPADM

## 2021-09-29 RX ORDER — CEFAZOLIN SODIUM 1 G/50ML
1000 SOLUTION INTRAVENOUS EVERY 24 HOURS
Status: DISCONTINUED | OUTPATIENT
Start: 2021-09-29 | End: 2021-10-04

## 2021-09-29 RX ORDER — GABAPENTIN 100 MG/1
100 CAPSULE ORAL 3 TIMES DAILY
COMMUNITY
Start: 2021-09-27 | End: 2021-11-21

## 2021-09-29 RX ADMIN — WARFARIN SODIUM 9 MG: 3 TABLET ORAL at 21:12

## 2021-09-29 RX ADMIN — B-COMPLEX W/ C & FOLIC ACID TAB 1 TABLET: TAB at 17:42

## 2021-09-29 RX ADMIN — DICLOFENAC SODIUM 2 G: 10 GEL TOPICAL at 21:14

## 2021-09-29 RX ADMIN — NIFEDIPINE 30 MG: 30 TABLET, FILM COATED, EXTENDED RELEASE ORAL at 17:42

## 2021-09-29 RX ADMIN — DOCUSATE SODIUM 100 MG: 100 CAPSULE, LIQUID FILLED ORAL at 17:40

## 2021-09-29 RX ADMIN — CALCIUM CARBONATE 625 MG: 1250 SUSPENSION ORAL at 21:12

## 2021-09-29 RX ADMIN — PREDNISOLONE ACETATE 1 DROP: 10 SUSPENSION/ DROPS OPHTHALMIC at 21:13

## 2021-09-29 RX ADMIN — OXYCODONE HYDROCHLORIDE 10 MG: 5 SOLUTION ORAL at 17:37

## 2021-09-29 RX ADMIN — DICYCLOMINE HYDROCHLORIDE 10 MG: 10 CAPSULE ORAL at 21:12

## 2021-09-29 RX ADMIN — ACETAMINOPHEN 650 MG: 325 TABLET, FILM COATED ORAL at 21:13

## 2021-09-29 RX ADMIN — CINACALCET 30 MG: 30 TABLET, FILM COATED ORAL at 21:12

## 2021-09-29 RX ADMIN — ATROPINE SULFATE 1 DROP: 10 SOLUTION/ DROPS OPHTHALMIC at 21:13

## 2021-09-29 RX ADMIN — GABAPENTIN 300 MG: 300 CAPSULE ORAL at 17:39

## 2021-09-29 RX ADMIN — NYSTATIN: 100000 POWDER TOPICAL at 21:13

## 2021-09-29 RX ADMIN — SERTRALINE 50 MG: 25 TABLET, FILM COATED ORAL at 17:41

## 2021-09-29 RX ADMIN — TORSEMIDE 100 MG: 20 TABLET ORAL at 17:40

## 2021-09-29 RX ADMIN — CEFAZOLIN SODIUM 1000 MG: 1 SOLUTION INTRAVENOUS at 17:36

## 2021-09-29 RX ADMIN — METHOCARBAMOL 500 MG: 500 TABLET, FILM COATED ORAL at 17:40

## 2021-09-29 RX ADMIN — ATORVASTATIN CALCIUM 20 MG: 20 TABLET, FILM COATED ORAL at 17:40

## 2021-09-29 RX ADMIN — LORATADINE 10 MG: 10 TABLET ORAL at 17:40

## 2021-09-30 ENCOUNTER — TELEPHONE (OUTPATIENT)
Dept: RADIOLOGY | Facility: HOSPITAL | Age: 56
End: 2021-09-30

## 2021-09-30 ENCOUNTER — APPOINTMENT (INPATIENT)
Dept: DIALYSIS | Facility: HOSPITAL | Age: 56
DRG: 622 | End: 2021-09-30
Payer: MEDICARE

## 2021-09-30 PROBLEM — L03.90 CELLULITIS: Status: RESOLVED | Noted: 2021-09-29 | Resolved: 2021-09-30

## 2021-09-30 LAB
EST. AVERAGE GLUCOSE BLD GHB EST-MCNC: 180 MG/DL
GLUCOSE SERPL-MCNC: 119 MG/DL (ref 65–140)
GLUCOSE SERPL-MCNC: 243 MG/DL (ref 65–140)
GLUCOSE SERPL-MCNC: 317 MG/DL (ref 65–140)
HBA1C MFR BLD: 7.9 %

## 2021-09-30 PROCEDURE — 99222 1ST HOSP IP/OBS MODERATE 55: CPT | Performed by: NURSE PRACTITIONER

## 2021-09-30 PROCEDURE — 83036 HEMOGLOBIN GLYCOSYLATED A1C: CPT

## 2021-09-30 PROCEDURE — 90935 HEMODIALYSIS ONE EVALUATION: CPT | Performed by: INTERNAL MEDICINE

## 2021-09-30 PROCEDURE — 99222 1ST HOSP IP/OBS MODERATE 55: CPT | Performed by: INTERNAL MEDICINE

## 2021-09-30 PROCEDURE — 82948 REAGENT STRIP/BLOOD GLUCOSE: CPT

## 2021-09-30 PROCEDURE — 99232 SBSQ HOSP IP/OBS MODERATE 35: CPT | Performed by: PODIATRIST

## 2021-09-30 RX ORDER — INSULIN GLARGINE 100 [IU]/ML
20 INJECTION, SOLUTION SUBCUTANEOUS
Status: DISCONTINUED | OUTPATIENT
Start: 2021-09-30 | End: 2021-10-03

## 2021-09-30 RX ORDER — INSULIN GLARGINE 100 [IU]/ML
20 INJECTION, SOLUTION SUBCUTANEOUS
Status: DISCONTINUED | OUTPATIENT
Start: 2021-09-30 | End: 2021-09-30

## 2021-09-30 RX ORDER — NIFEDIPINE 30 MG/1
30 TABLET, EXTENDED RELEASE ORAL DAILY
Status: DISCONTINUED | OUTPATIENT
Start: 2021-10-01 | End: 2021-10-18 | Stop reason: HOSPADM

## 2021-09-30 RX ORDER — DOXERCALCIFEROL 2 UG/ML
2 INJECTION, SOLUTION INTRAVENOUS
Status: DISCONTINUED | OUTPATIENT
Start: 2021-09-30 | End: 2021-10-18 | Stop reason: HOSPADM

## 2021-09-30 RX ADMIN — CEFAZOLIN SODIUM 1000 MG: 1 SOLUTION INTRAVENOUS at 17:33

## 2021-09-30 RX ADMIN — PANTOPRAZOLE SODIUM 40 MG: 40 TABLET, DELAYED RELEASE ORAL at 05:53

## 2021-09-30 RX ADMIN — ATROPINE SULFATE 1 DROP: 10 SOLUTION/ DROPS OPHTHALMIC at 22:28

## 2021-09-30 RX ADMIN — SERTRALINE 50 MG: 25 TABLET, FILM COATED ORAL at 12:35

## 2021-09-30 RX ADMIN — ATORVASTATIN CALCIUM 20 MG: 20 TABLET, FILM COATED ORAL at 17:37

## 2021-09-30 RX ADMIN — PREDNISOLONE ACETATE 1 DROP: 10 SUSPENSION/ DROPS OPHTHALMIC at 17:44

## 2021-09-30 RX ADMIN — OXYCODONE HYDROCHLORIDE 10 MG: 5 SOLUTION ORAL at 14:58

## 2021-09-30 RX ADMIN — HYDROMORPHONE HYDROCHLORIDE 0.5 MG: 1 INJECTION, SOLUTION INTRAMUSCULAR; INTRAVENOUS; SUBCUTANEOUS at 10:05

## 2021-09-30 RX ADMIN — DICLOFENAC SODIUM 2 G: 10 GEL TOPICAL at 17:42

## 2021-09-30 RX ADMIN — INSULIN GLARGINE 20 UNITS: 100 INJECTION, SOLUTION SUBCUTANEOUS at 22:28

## 2021-09-30 RX ADMIN — LEVOTHYROXINE SODIUM 50 MCG: 50 TABLET ORAL at 05:53

## 2021-09-30 RX ADMIN — PREDNISOLONE ACETATE 1 DROP: 10 SUSPENSION/ DROPS OPHTHALMIC at 22:29

## 2021-09-30 RX ADMIN — SEVELAMER HYDROCHLORIDE 800 MG: 800 TABLET, FILM COATED PARENTERAL at 17:47

## 2021-09-30 RX ADMIN — METHOCARBAMOL 500 MG: 500 TABLET, FILM COATED ORAL at 12:35

## 2021-09-30 RX ADMIN — DICLOFENAC SODIUM 2 G: 10 GEL TOPICAL at 22:30

## 2021-09-30 RX ADMIN — NYSTATIN: 100000 POWDER TOPICAL at 12:42

## 2021-09-30 RX ADMIN — INSULIN LISPRO 8 UNITS: 100 INJECTION, SOLUTION INTRAVENOUS; SUBCUTANEOUS at 17:42

## 2021-09-30 RX ADMIN — OXYCODONE HYDROCHLORIDE 10 MG: 5 SOLUTION ORAL at 22:29

## 2021-09-30 RX ADMIN — METHOCARBAMOL 500 MG: 500 TABLET, FILM COATED ORAL at 01:45

## 2021-09-30 RX ADMIN — DICYCLOMINE HYDROCHLORIDE 10 MG: 10 CAPSULE ORAL at 12:39

## 2021-09-30 RX ADMIN — ALPRAZOLAM 0.25 MG: 0.25 TABLET ORAL at 07:19

## 2021-09-30 RX ADMIN — CALCIUM CARBONATE 625 MG: 1250 SUSPENSION ORAL at 12:38

## 2021-09-30 RX ADMIN — NYSTATIN: 100000 POWDER TOPICAL at 17:45

## 2021-09-30 RX ADMIN — DOCUSATE SODIUM 100 MG: 100 CAPSULE, LIQUID FILLED ORAL at 17:37

## 2021-09-30 RX ADMIN — DICYCLOMINE HYDROCHLORIDE 10 MG: 10 CAPSULE ORAL at 22:29

## 2021-09-30 RX ADMIN — TORSEMIDE 100 MG: 20 TABLET ORAL at 05:52

## 2021-09-30 RX ADMIN — WARFARIN SODIUM 9 MG: 3 TABLET ORAL at 17:43

## 2021-09-30 RX ADMIN — B-COMPLEX W/ C & FOLIC ACID TAB 1 TABLET: TAB at 12:37

## 2021-09-30 RX ADMIN — LORATADINE 10 MG: 10 TABLET ORAL at 12:35

## 2021-09-30 RX ADMIN — Medication 4 MG: at 08:23

## 2021-09-30 RX ADMIN — TORSEMIDE 100 MG: 20 TABLET ORAL at 17:37

## 2021-09-30 RX ADMIN — CALCIUM CARBONATE 625 MG: 1250 SUSPENSION ORAL at 17:43

## 2021-09-30 RX ADMIN — COLLAGENASE SANTYL: 250 OINTMENT TOPICAL at 16:03

## 2021-09-30 RX ADMIN — GABAPENTIN 300 MG: 300 CAPSULE ORAL at 12:36

## 2021-09-30 RX ADMIN — INSULIN LISPRO 4 UNITS: 100 INJECTION, SOLUTION INTRAVENOUS; SUBCUTANEOUS at 12:41

## 2021-09-30 RX ADMIN — METHOCARBAMOL 500 MG: 500 TABLET, FILM COATED ORAL at 17:36

## 2021-09-30 RX ADMIN — DIPHENHYDRAMINE HCL 25 MG: 25 TABLET ORAL at 11:27

## 2021-09-30 RX ADMIN — DICYCLOMINE HYDROCHLORIDE 10 MG: 10 CAPSULE ORAL at 17:44

## 2021-09-30 RX ADMIN — DICLOFENAC SODIUM 2 G: 10 GEL TOPICAL at 12:36

## 2021-09-30 RX ADMIN — METHOCARBAMOL 500 MG: 500 TABLET, FILM COATED ORAL at 05:52

## 2021-09-30 RX ADMIN — SEVELAMER HYDROCHLORIDE 800 MG: 800 TABLET, FILM COATED PARENTERAL at 12:40

## 2021-09-30 RX ADMIN — INSULIN LISPRO 10 UNITS: 100 INJECTION, SOLUTION INTRAVENOUS; SUBCUTANEOUS at 01:44

## 2021-09-30 RX ADMIN — PREDNISOLONE ACETATE 1 DROP: 10 SUSPENSION/ DROPS OPHTHALMIC at 12:40

## 2021-09-30 RX ADMIN — DOXERCALCIFEROL 2 MCG: 4 INJECTION, SOLUTION INTRAVENOUS at 10:06

## 2021-09-30 RX ADMIN — CINACALCET 30 MG: 30 TABLET, FILM COATED ORAL at 12:39

## 2021-10-01 ENCOUNTER — APPOINTMENT (INPATIENT)
Dept: RADIOLOGY | Facility: HOSPITAL | Age: 56
DRG: 622 | End: 2021-10-01
Payer: MEDICARE

## 2021-10-01 LAB
ALBUMIN SERPL BCP-MCNC: 2.4 G/DL (ref 3.5–5)
ALP SERPL-CCNC: 163 U/L (ref 46–116)
ALT SERPL W P-5'-P-CCNC: 12 U/L (ref 12–78)
ANION GAP SERPL CALCULATED.3IONS-SCNC: 8 MMOL/L (ref 4–13)
AST SERPL W P-5'-P-CCNC: 15 U/L (ref 5–45)
BASOPHILS # BLD AUTO: 0.03 THOUSANDS/ΜL (ref 0–0.1)
BASOPHILS NFR BLD AUTO: 0 % (ref 0–1)
BILIRUB SERPL-MCNC: 0.57 MG/DL (ref 0.2–1)
BUN SERPL-MCNC: 46 MG/DL (ref 5–25)
CALCIUM ALBUM COR SERPL-MCNC: 9.8 MG/DL (ref 8.3–10.1)
CALCIUM SERPL-MCNC: 8.5 MG/DL (ref 8.3–10.1)
CHLORIDE SERPL-SCNC: 97 MMOL/L (ref 100–108)
CO2 SERPL-SCNC: 27 MMOL/L (ref 21–32)
CREAT SERPL-MCNC: 6.67 MG/DL (ref 0.6–1.3)
CRYSTALS SNV QL MICRO: NORMAL
EOSINOPHIL # BLD AUTO: 0.09 THOUSAND/ΜL (ref 0–0.61)
EOSINOPHIL NFR BLD AUTO: 1 % (ref 0–6)
ERYTHROCYTE [DISTWIDTH] IN BLOOD BY AUTOMATED COUNT: 13.6 % (ref 11.6–15.1)
GFR SERPL CREATININE-BSD FRML MDRD: 6 ML/MIN/1.73SQ M
GLUCOSE SERPL-MCNC: 183 MG/DL (ref 65–140)
GLUCOSE SERPL-MCNC: 186 MG/DL (ref 65–140)
GLUCOSE SERPL-MCNC: 191 MG/DL (ref 65–140)
GLUCOSE SERPL-MCNC: 237 MG/DL (ref 65–140)
GLUCOSE SERPL-MCNC: 94 MG/DL (ref 65–140)
HCT VFR BLD AUTO: 27.6 % (ref 34.8–46.1)
HGB BLD-MCNC: 8.8 G/DL (ref 11.5–15.4)
IMM GRANULOCYTES # BLD AUTO: 0.05 THOUSAND/UL (ref 0–0.2)
IMM GRANULOCYTES NFR BLD AUTO: 1 % (ref 0–2)
INR PPP: 2.33 (ref 0.84–1.19)
LYMPHOCYTES # BLD AUTO: 0.65 THOUSANDS/ΜL (ref 0.6–4.47)
LYMPHOCYTES NFR BLD AUTO: 8 % (ref 14–44)
MCH RBC QN AUTO: 31 PG (ref 26.8–34.3)
MCHC RBC AUTO-ENTMCNC: 31.9 G/DL (ref 31.4–37.4)
MCV RBC AUTO: 97 FL (ref 82–98)
MONOCYTES # BLD AUTO: 0.67 THOUSAND/ΜL (ref 0.17–1.22)
MONOCYTES NFR BLD AUTO: 9 % (ref 4–12)
NEUTROPHILS # BLD AUTO: 6.26 THOUSANDS/ΜL (ref 1.85–7.62)
NEUTS SEG NFR BLD AUTO: 81 % (ref 43–75)
NRBC BLD AUTO-RTO: 0 /100 WBCS
PLATELET # BLD AUTO: 176 THOUSANDS/UL (ref 149–390)
PMV BLD AUTO: 11.1 FL (ref 8.9–12.7)
POTASSIUM SERPL-SCNC: 4.6 MMOL/L (ref 3.5–5.3)
PROT SERPL-MCNC: 6.7 G/DL (ref 6.4–8.2)
PROTHROMBIN TIME: 24.4 SECONDS (ref 11.6–14.5)
RBC # BLD AUTO: 2.84 MILLION/UL (ref 3.81–5.12)
SODIUM SERPL-SCNC: 132 MMOL/L (ref 136–145)
WBC # BLD AUTO: 7.75 THOUSAND/UL (ref 4.31–10.16)

## 2021-10-01 PROCEDURE — 87186 SC STD MICRODIL/AGAR DIL: CPT

## 2021-10-01 PROCEDURE — G1004 CDSM NDSC: HCPCS

## 2021-10-01 PROCEDURE — 87070 CULTURE OTHR SPECIMN AEROBIC: CPT

## 2021-10-01 PROCEDURE — 99232 SBSQ HOSP IP/OBS MODERATE 35: CPT | Performed by: NURSE PRACTITIONER

## 2021-10-01 PROCEDURE — 85610 PROTHROMBIN TIME: CPT | Performed by: INTERNAL MEDICINE

## 2021-10-01 PROCEDURE — 82948 REAGENT STRIP/BLOOD GLUCOSE: CPT

## 2021-10-01 PROCEDURE — 80053 COMPREHEN METABOLIC PANEL: CPT | Performed by: NURSE PRACTITIONER

## 2021-10-01 PROCEDURE — 99232 SBSQ HOSP IP/OBS MODERATE 35: CPT | Performed by: PODIATRIST

## 2021-10-01 PROCEDURE — 87205 SMEAR GRAM STAIN: CPT

## 2021-10-01 PROCEDURE — 20600 DRAIN/INJ JOINT/BURSA W/O US: CPT | Performed by: PODIATRIST

## 2021-10-01 PROCEDURE — 85025 COMPLETE CBC W/AUTO DIFF WBC: CPT | Performed by: NURSE PRACTITIONER

## 2021-10-01 PROCEDURE — 73718 MRI LOWER EXTREMITY W/O DYE: CPT

## 2021-10-01 PROCEDURE — 89060 EXAM SYNOVIAL FLUID CRYSTALS: CPT

## 2021-10-01 RX ORDER — LIDOCAINE HYDROCHLORIDE 10 MG/ML
10 INJECTION, SOLUTION EPIDURAL; INFILTRATION; INTRACAUDAL; PERINEURAL ONCE
Status: COMPLETED | OUTPATIENT
Start: 2021-10-01 | End: 2021-10-01

## 2021-10-01 RX ADMIN — SEVELAMER HYDROCHLORIDE 800 MG: 800 TABLET, FILM COATED PARENTERAL at 16:49

## 2021-10-01 RX ADMIN — INSULIN LISPRO 3 UNITS: 100 INJECTION, SOLUTION INTRAVENOUS; SUBCUTANEOUS at 16:50

## 2021-10-01 RX ADMIN — ALPRAZOLAM 0.25 MG: 0.25 TABLET ORAL at 14:47

## 2021-10-01 RX ADMIN — LIDOCAINE HYDROCHLORIDE 10 ML: 10 INJECTION, SOLUTION EPIDURAL; INFILTRATION; INTRACAUDAL at 13:38

## 2021-10-01 RX ADMIN — SERTRALINE 50 MG: 25 TABLET, FILM COATED ORAL at 08:19

## 2021-10-01 RX ADMIN — ATORVASTATIN CALCIUM 20 MG: 20 TABLET, FILM COATED ORAL at 18:56

## 2021-10-01 RX ADMIN — B-COMPLEX W/ C & FOLIC ACID TAB 1 TABLET: TAB at 08:09

## 2021-10-01 RX ADMIN — DICYCLOMINE HYDROCHLORIDE 10 MG: 10 CAPSULE ORAL at 12:14

## 2021-10-01 RX ADMIN — OXYCODONE HYDROCHLORIDE AND ACETAMINOPHEN 1 TABLET: 5; 325 TABLET ORAL at 22:03

## 2021-10-01 RX ADMIN — TORSEMIDE 100 MG: 20 TABLET ORAL at 18:55

## 2021-10-01 RX ADMIN — COLLAGENASE SANTYL: 250 OINTMENT TOPICAL at 11:27

## 2021-10-01 RX ADMIN — METHOCARBAMOL 500 MG: 500 TABLET, FILM COATED ORAL at 18:56

## 2021-10-01 RX ADMIN — CALCIUM CARBONATE 625 MG: 1250 SUSPENSION ORAL at 16:49

## 2021-10-01 RX ADMIN — CEFAZOLIN SODIUM 1000 MG: 1 SOLUTION INTRAVENOUS at 18:55

## 2021-10-01 RX ADMIN — HYDROMORPHONE HYDROCHLORIDE 0.5 MG: 1 INJECTION, SOLUTION INTRAMUSCULAR; INTRAVENOUS; SUBCUTANEOUS at 08:10

## 2021-10-01 RX ADMIN — OXYCODONE HYDROCHLORIDE AND ACETAMINOPHEN 1 TABLET: 5; 325 TABLET ORAL at 16:57

## 2021-10-01 RX ADMIN — INSULIN GLARGINE 20 UNITS: 100 INJECTION, SOLUTION SUBCUTANEOUS at 22:00

## 2021-10-01 RX ADMIN — DICYCLOMINE HYDROCHLORIDE 10 MG: 10 CAPSULE ORAL at 16:50

## 2021-10-01 RX ADMIN — LORATADINE 10 MG: 10 TABLET ORAL at 08:08

## 2021-10-01 RX ADMIN — SEVELAMER HYDROCHLORIDE 800 MG: 800 TABLET, FILM COATED PARENTERAL at 12:15

## 2021-10-01 RX ADMIN — TORSEMIDE 100 MG: 20 TABLET ORAL at 05:17

## 2021-10-01 RX ADMIN — METHOCARBAMOL 500 MG: 500 TABLET, FILM COATED ORAL at 01:09

## 2021-10-01 RX ADMIN — DICYCLOMINE HYDROCHLORIDE 10 MG: 10 CAPSULE ORAL at 22:01

## 2021-10-01 RX ADMIN — METHOCARBAMOL 500 MG: 500 TABLET, FILM COATED ORAL at 05:17

## 2021-10-01 RX ADMIN — NYSTATIN: 100000 POWDER TOPICAL at 08:21

## 2021-10-01 RX ADMIN — DICYCLOMINE HYDROCHLORIDE 10 MG: 10 CAPSULE ORAL at 06:30

## 2021-10-01 RX ADMIN — INSULIN LISPRO 3 UNITS: 100 INJECTION, SOLUTION INTRAVENOUS; SUBCUTANEOUS at 08:07

## 2021-10-01 RX ADMIN — PREDNISOLONE ACETATE 1 DROP: 10 SUSPENSION/ DROPS OPHTHALMIC at 18:56

## 2021-10-01 RX ADMIN — INSULIN LISPRO 2 UNITS: 100 INJECTION, SOLUTION INTRAVENOUS; SUBCUTANEOUS at 16:50

## 2021-10-01 RX ADMIN — METHOCARBAMOL 500 MG: 500 TABLET, FILM COATED ORAL at 12:14

## 2021-10-01 RX ADMIN — LEVOTHYROXINE SODIUM 50 MCG: 50 TABLET ORAL at 05:17

## 2021-10-01 RX ADMIN — INSULIN LISPRO 2 UNITS: 100 INJECTION, SOLUTION INTRAVENOUS; SUBCUTANEOUS at 08:06

## 2021-10-01 RX ADMIN — PREDNISOLONE ACETATE 1 DROP: 10 SUSPENSION/ DROPS OPHTHALMIC at 22:01

## 2021-10-01 RX ADMIN — PANTOPRAZOLE SODIUM 40 MG: 40 TABLET, DELAYED RELEASE ORAL at 05:17

## 2021-10-01 RX ADMIN — Medication 4 MG: at 05:19

## 2021-10-01 RX ADMIN — ATROPINE SULFATE 1 DROP: 10 SOLUTION/ DROPS OPHTHALMIC at 22:01

## 2021-10-01 RX ADMIN — CINACALCET 30 MG: 30 TABLET, FILM COATED ORAL at 08:08

## 2021-10-01 RX ADMIN — CALCIUM CARBONATE 625 MG: 1250 SUSPENSION ORAL at 06:30

## 2021-10-01 RX ADMIN — SEVELAMER HYDROCHLORIDE 800 MG: 800 TABLET, FILM COATED PARENTERAL at 06:30

## 2021-10-01 RX ADMIN — DOCUSATE SODIUM 100 MG: 100 CAPSULE, LIQUID FILLED ORAL at 08:07

## 2021-10-01 RX ADMIN — NIFEDIPINE 30 MG: 30 TABLET, FILM COATED, EXTENDED RELEASE ORAL at 08:08

## 2021-10-01 RX ADMIN — GABAPENTIN 300 MG: 300 CAPSULE ORAL at 08:08

## 2021-10-01 NOTE — DISCHARGE INSTR - AVS FIRST PAGE
Protime on Tuesday, coumadin dose as per protime  Check blood sugar before meals and at bedtime narrow base of support

## 2021-10-02 ENCOUNTER — APPOINTMENT (INPATIENT)
Dept: DIALYSIS | Facility: HOSPITAL | Age: 56
DRG: 622 | End: 2021-10-02
Payer: MEDICARE

## 2021-10-02 LAB
BASOPHILS # BLD AUTO: 0.03 THOUSANDS/ΜL (ref 0–0.1)
BASOPHILS NFR BLD AUTO: 0 % (ref 0–1)
EOSINOPHIL # BLD AUTO: 0.14 THOUSAND/ΜL (ref 0–0.61)
EOSINOPHIL NFR BLD AUTO: 2 % (ref 0–6)
ERYTHROCYTE [DISTWIDTH] IN BLOOD BY AUTOMATED COUNT: 13.6 % (ref 11.6–15.1)
GLUCOSE SERPL-MCNC: 169 MG/DL (ref 65–140)
GLUCOSE SERPL-MCNC: 176 MG/DL (ref 65–140)
GLUCOSE SERPL-MCNC: 248 MG/DL (ref 65–140)
GLUCOSE SERPL-MCNC: 93 MG/DL (ref 65–140)
HCT VFR BLD AUTO: 27.9 % (ref 34.8–46.1)
HGB BLD-MCNC: 9 G/DL (ref 11.5–15.4)
IMM GRANULOCYTES # BLD AUTO: 0.04 THOUSAND/UL (ref 0–0.2)
IMM GRANULOCYTES NFR BLD AUTO: 1 % (ref 0–2)
INR PPP: 2.46 (ref 0.84–1.19)
LYMPHOCYTES # BLD AUTO: 0.98 THOUSANDS/ΜL (ref 0.6–4.47)
LYMPHOCYTES NFR BLD AUTO: 13 % (ref 14–44)
MCH RBC QN AUTO: 31.1 PG (ref 26.8–34.3)
MCHC RBC AUTO-ENTMCNC: 32.3 G/DL (ref 31.4–37.4)
MCV RBC AUTO: 97 FL (ref 82–98)
MONOCYTES # BLD AUTO: 0.86 THOUSAND/ΜL (ref 0.17–1.22)
MONOCYTES NFR BLD AUTO: 11 % (ref 4–12)
NEUTROPHILS # BLD AUTO: 5.76 THOUSANDS/ΜL (ref 1.85–7.62)
NEUTS SEG NFR BLD AUTO: 73 % (ref 43–75)
NRBC BLD AUTO-RTO: 0 /100 WBCS
PLATELET # BLD AUTO: 209 THOUSANDS/UL (ref 149–390)
PMV BLD AUTO: 11.3 FL (ref 8.9–12.7)
PROTHROMBIN TIME: 25.4 SECONDS (ref 11.6–14.5)
RBC # BLD AUTO: 2.89 MILLION/UL (ref 3.81–5.12)
WBC # BLD AUTO: 7.81 THOUSAND/UL (ref 4.31–10.16)

## 2021-10-02 PROCEDURE — 99232 SBSQ HOSP IP/OBS MODERATE 35: CPT | Performed by: PODIATRIST

## 2021-10-02 PROCEDURE — 85610 PROTHROMBIN TIME: CPT | Performed by: NURSE PRACTITIONER

## 2021-10-02 PROCEDURE — 85025 COMPLETE CBC W/AUTO DIFF WBC: CPT | Performed by: NURSE PRACTITIONER

## 2021-10-02 PROCEDURE — 99232 SBSQ HOSP IP/OBS MODERATE 35: CPT | Performed by: INTERNAL MEDICINE

## 2021-10-02 PROCEDURE — 99232 SBSQ HOSP IP/OBS MODERATE 35: CPT | Performed by: NURSE PRACTITIONER

## 2021-10-02 PROCEDURE — 82948 REAGENT STRIP/BLOOD GLUCOSE: CPT

## 2021-10-02 RX ADMIN — DICYCLOMINE HYDROCHLORIDE 10 MG: 10 CAPSULE ORAL at 23:05

## 2021-10-02 RX ADMIN — ATORVASTATIN CALCIUM 20 MG: 20 TABLET, FILM COATED ORAL at 18:22

## 2021-10-02 RX ADMIN — SEVELAMER HYDROCHLORIDE 800 MG: 800 TABLET, FILM COATED PARENTERAL at 06:56

## 2021-10-02 RX ADMIN — DOCUSATE SODIUM 100 MG: 100 CAPSULE, LIQUID FILLED ORAL at 18:23

## 2021-10-02 RX ADMIN — DICLOFENAC SODIUM 2 G: 10 GEL TOPICAL at 12:41

## 2021-10-02 RX ADMIN — METHOCARBAMOL 500 MG: 500 TABLET, FILM COATED ORAL at 12:40

## 2021-10-02 RX ADMIN — PREDNISOLONE ACETATE 1 DROP: 10 SUSPENSION/ DROPS OPHTHALMIC at 23:05

## 2021-10-02 RX ADMIN — CEFAZOLIN SODIUM 1000 MG: 1 SOLUTION INTRAVENOUS at 16:39

## 2021-10-02 RX ADMIN — LORATADINE 10 MG: 10 TABLET ORAL at 08:53

## 2021-10-02 RX ADMIN — OXYCODONE HYDROCHLORIDE AND ACETAMINOPHEN 1 TABLET: 5; 325 TABLET ORAL at 14:34

## 2021-10-02 RX ADMIN — DICLOFENAC SODIUM 2 G: 10 GEL TOPICAL at 09:00

## 2021-10-02 RX ADMIN — SEVELAMER HYDROCHLORIDE 800 MG: 800 TABLET, FILM COATED PARENTERAL at 18:26

## 2021-10-02 RX ADMIN — INSULIN LISPRO 3 UNITS: 100 INJECTION, SOLUTION INTRAVENOUS; SUBCUTANEOUS at 12:42

## 2021-10-02 RX ADMIN — INSULIN LISPRO 2 UNITS: 100 INJECTION, SOLUTION INTRAVENOUS; SUBCUTANEOUS at 18:27

## 2021-10-02 RX ADMIN — DICYCLOMINE HYDROCHLORIDE 10 MG: 10 CAPSULE ORAL at 06:56

## 2021-10-02 RX ADMIN — DICYCLOMINE HYDROCHLORIDE 10 MG: 10 CAPSULE ORAL at 18:26

## 2021-10-02 RX ADMIN — PANTOPRAZOLE SODIUM 40 MG: 40 TABLET, DELAYED RELEASE ORAL at 06:55

## 2021-10-02 RX ADMIN — DICLOFENAC SODIUM 2 G: 10 GEL TOPICAL at 23:10

## 2021-10-02 RX ADMIN — CALCIUM CARBONATE 625 MG: 1250 SUSPENSION ORAL at 18:26

## 2021-10-02 RX ADMIN — CALCIUM CARBONATE 625 MG: 1250 SUSPENSION ORAL at 06:56

## 2021-10-02 RX ADMIN — ATROPINE SULFATE 1 DROP: 10 SOLUTION/ DROPS OPHTHALMIC at 23:05

## 2021-10-02 RX ADMIN — CINACALCET 30 MG: 30 TABLET, FILM COATED ORAL at 08:53

## 2021-10-02 RX ADMIN — METHOCARBAMOL 500 MG: 500 TABLET, FILM COATED ORAL at 06:56

## 2021-10-02 RX ADMIN — DOCUSATE SODIUM 100 MG: 100 CAPSULE, LIQUID FILLED ORAL at 08:53

## 2021-10-02 RX ADMIN — PREDNISOLONE ACETATE 1 DROP: 10 SUSPENSION/ DROPS OPHTHALMIC at 18:26

## 2021-10-02 RX ADMIN — PREDNISOLONE ACETATE 1 DROP: 10 SUSPENSION/ DROPS OPHTHALMIC at 08:54

## 2021-10-02 RX ADMIN — DOXERCALCIFEROL 2 MCG: 4 INJECTION, SOLUTION INTRAVENOUS at 14:23

## 2021-10-02 RX ADMIN — NYSTATIN: 100000 POWDER TOPICAL at 08:54

## 2021-10-02 RX ADMIN — INSULIN LISPRO 3 UNITS: 100 INJECTION, SOLUTION INTRAVENOUS; SUBCUTANEOUS at 08:54

## 2021-10-02 RX ADMIN — TORSEMIDE 100 MG: 20 TABLET ORAL at 18:22

## 2021-10-02 RX ADMIN — SERTRALINE 50 MG: 25 TABLET, FILM COATED ORAL at 08:53

## 2021-10-02 RX ADMIN — COLLAGENASE SANTYL: 250 OINTMENT TOPICAL at 08:59

## 2021-10-02 RX ADMIN — DICLOFENAC SODIUM 2 G: 10 GEL TOPICAL at 18:28

## 2021-10-02 RX ADMIN — NYSTATIN: 100000 POWDER TOPICAL at 18:28

## 2021-10-02 RX ADMIN — HYDROMORPHONE HYDROCHLORIDE 0.5 MG: 1 INJECTION, SOLUTION INTRAMUSCULAR; INTRAVENOUS; SUBCUTANEOUS at 18:58

## 2021-10-02 RX ADMIN — GABAPENTIN 300 MG: 300 CAPSULE ORAL at 08:54

## 2021-10-02 RX ADMIN — OXYCODONE HYDROCHLORIDE AND ACETAMINOPHEN 1 TABLET: 5; 325 TABLET ORAL at 09:50

## 2021-10-02 RX ADMIN — B-COMPLEX W/ C & FOLIC ACID TAB 1 TABLET: TAB at 08:53

## 2021-10-02 RX ADMIN — INSULIN LISPRO 4 UNITS: 100 INJECTION, SOLUTION INTRAVENOUS; SUBCUTANEOUS at 08:54

## 2021-10-02 RX ADMIN — LEVOTHYROXINE SODIUM 50 MCG: 50 TABLET ORAL at 06:56

## 2021-10-02 RX ADMIN — INSULIN LISPRO 3 UNITS: 100 INJECTION, SOLUTION INTRAVENOUS; SUBCUTANEOUS at 18:27

## 2021-10-02 RX ADMIN — INSULIN GLARGINE 20 UNITS: 100 INJECTION, SOLUTION SUBCUTANEOUS at 23:05

## 2021-10-02 RX ADMIN — METHOCARBAMOL 500 MG: 500 TABLET, FILM COATED ORAL at 18:23

## 2021-10-02 RX ADMIN — METHOCARBAMOL 500 MG: 500 TABLET, FILM COATED ORAL at 00:39

## 2021-10-03 LAB
APTT PPP: 37 SECONDS (ref 23–37)
APTT PPP: 41 SECONDS (ref 23–37)
ERYTHROCYTE [DISTWIDTH] IN BLOOD BY AUTOMATED COUNT: 13.4 % (ref 11.6–15.1)
GLUCOSE SERPL-MCNC: 128 MG/DL (ref 65–140)
GLUCOSE SERPL-MCNC: 133 MG/DL (ref 65–140)
GLUCOSE SERPL-MCNC: 187 MG/DL (ref 65–140)
GLUCOSE SERPL-MCNC: 74 MG/DL (ref 65–140)
HCT VFR BLD AUTO: 28.1 % (ref 34.8–46.1)
HGB BLD-MCNC: 9.1 G/DL (ref 11.5–15.4)
INR PPP: 1.62 (ref 0.84–1.19)
INR PPP: 1.75 (ref 0.84–1.19)
MCH RBC QN AUTO: 31.1 PG (ref 26.8–34.3)
MCHC RBC AUTO-ENTMCNC: 32.4 G/DL (ref 31.4–37.4)
MCV RBC AUTO: 96 FL (ref 82–98)
PLATELET # BLD AUTO: 211 THOUSANDS/UL (ref 149–390)
PMV BLD AUTO: 10.4 FL (ref 8.9–12.7)
PROTHROMBIN TIME: 18.5 SECONDS (ref 11.6–14.5)
PROTHROMBIN TIME: 19.7 SECONDS (ref 11.6–14.5)
RBC # BLD AUTO: 2.93 MILLION/UL (ref 3.81–5.12)
WBC # BLD AUTO: 8.46 THOUSAND/UL (ref 4.31–10.16)

## 2021-10-03 PROCEDURE — 99232 SBSQ HOSP IP/OBS MODERATE 35: CPT | Performed by: NURSE PRACTITIONER

## 2021-10-03 PROCEDURE — 82948 REAGENT STRIP/BLOOD GLUCOSE: CPT

## 2021-10-03 PROCEDURE — 85027 COMPLETE CBC AUTOMATED: CPT | Performed by: NURSE PRACTITIONER

## 2021-10-03 PROCEDURE — 99232 SBSQ HOSP IP/OBS MODERATE 35: CPT | Performed by: PODIATRIST

## 2021-10-03 PROCEDURE — NC001 PR NO CHARGE: Performed by: RADIOLOGY

## 2021-10-03 PROCEDURE — 85610 PROTHROMBIN TIME: CPT | Performed by: NURSE PRACTITIONER

## 2021-10-03 PROCEDURE — 85730 THROMBOPLASTIN TIME PARTIAL: CPT | Performed by: NURSE PRACTITIONER

## 2021-10-03 RX ORDER — INSULIN GLARGINE 100 [IU]/ML
5 INJECTION, SOLUTION SUBCUTANEOUS ONCE
Status: COMPLETED | OUTPATIENT
Start: 2021-10-03 | End: 2021-10-03

## 2021-10-03 RX ORDER — HEPARIN SODIUM 1000 [USP'U]/ML
5000 INJECTION, SOLUTION INTRAVENOUS; SUBCUTANEOUS
Status: DISCONTINUED | OUTPATIENT
Start: 2021-10-03 | End: 2021-10-16

## 2021-10-03 RX ORDER — INSULIN GLARGINE 100 [IU]/ML
20 INJECTION, SOLUTION SUBCUTANEOUS
Status: DISCONTINUED | OUTPATIENT
Start: 2021-10-04 | End: 2021-10-04

## 2021-10-03 RX ORDER — HEPARIN SODIUM 10000 [USP'U]/100ML
3-30 INJECTION, SOLUTION INTRAVENOUS
Status: DISCONTINUED | OUTPATIENT
Start: 2021-10-03 | End: 2021-10-16

## 2021-10-03 RX ORDER — HEPARIN SODIUM 1000 [USP'U]/ML
10000 INJECTION, SOLUTION INTRAVENOUS; SUBCUTANEOUS
Status: DISCONTINUED | OUTPATIENT
Start: 2021-10-03 | End: 2021-10-16

## 2021-10-03 RX ADMIN — PREDNISOLONE ACETATE 1 DROP: 10 SUSPENSION/ DROPS OPHTHALMIC at 21:53

## 2021-10-03 RX ADMIN — NYSTATIN: 100000 POWDER TOPICAL at 08:24

## 2021-10-03 RX ADMIN — CINACALCET 30 MG: 30 TABLET, FILM COATED ORAL at 08:33

## 2021-10-03 RX ADMIN — B-COMPLEX W/ C & FOLIC ACID TAB 1 TABLET: TAB at 08:23

## 2021-10-03 RX ADMIN — DICLOFENAC SODIUM 2 G: 10 GEL TOPICAL at 21:55

## 2021-10-03 RX ADMIN — INSULIN GLARGINE 5 UNITS: 100 INJECTION, SOLUTION SUBCUTANEOUS at 22:12

## 2021-10-03 RX ADMIN — COLLAGENASE SANTYL: 250 OINTMENT TOPICAL at 08:24

## 2021-10-03 RX ADMIN — CALCIUM CARBONATE 625 MG: 1250 SUSPENSION ORAL at 08:21

## 2021-10-03 RX ADMIN — OXYCODONE HYDROCHLORIDE 10 MG: 5 SOLUTION ORAL at 11:10

## 2021-10-03 RX ADMIN — DICLOFENAC SODIUM 2 G: 10 GEL TOPICAL at 17:18

## 2021-10-03 RX ADMIN — OXYCODONE HYDROCHLORIDE 10 MG: 5 SOLUTION ORAL at 16:00

## 2021-10-03 RX ADMIN — DICLOFENAC SODIUM 2 G: 10 GEL TOPICAL at 13:04

## 2021-10-03 RX ADMIN — LORATADINE 10 MG: 10 TABLET ORAL at 08:23

## 2021-10-03 RX ADMIN — METHOCARBAMOL 500 MG: 500 TABLET, FILM COATED ORAL at 13:04

## 2021-10-03 RX ADMIN — ATORVASTATIN CALCIUM 20 MG: 20 TABLET, FILM COATED ORAL at 17:17

## 2021-10-03 RX ADMIN — PREDNISOLONE ACETATE 1 DROP: 10 SUSPENSION/ DROPS OPHTHALMIC at 13:05

## 2021-10-03 RX ADMIN — TORSEMIDE 100 MG: 20 TABLET ORAL at 05:47

## 2021-10-03 RX ADMIN — DOCUSATE SODIUM 100 MG: 100 CAPSULE, LIQUID FILLED ORAL at 17:17

## 2021-10-03 RX ADMIN — INSULIN LISPRO 3 UNITS: 100 INJECTION, SOLUTION INTRAVENOUS; SUBCUTANEOUS at 13:05

## 2021-10-03 RX ADMIN — HEPARIN SODIUM 22 UNITS/KG/HR: 10000 INJECTION, SOLUTION INTRAVENOUS at 21:26

## 2021-10-03 RX ADMIN — PREDNISOLONE ACETATE 1 DROP: 10 SUSPENSION/ DROPS OPHTHALMIC at 08:21

## 2021-10-03 RX ADMIN — HEPARIN SODIUM 18 UNITS/KG/HR: 10000 INJECTION, SOLUTION INTRAVENOUS at 11:49

## 2021-10-03 RX ADMIN — GABAPENTIN 300 MG: 300 CAPSULE ORAL at 08:23

## 2021-10-03 RX ADMIN — INSULIN LISPRO 3 UNITS: 100 INJECTION, SOLUTION INTRAVENOUS; SUBCUTANEOUS at 17:18

## 2021-10-03 RX ADMIN — METHOCARBAMOL 500 MG: 500 TABLET, FILM COATED ORAL at 17:17

## 2021-10-03 RX ADMIN — OXYCODONE HYDROCHLORIDE 10 MG: 5 SOLUTION ORAL at 00:23

## 2021-10-03 RX ADMIN — SEVELAMER HYDROCHLORIDE 800 MG: 800 TABLET, FILM COATED PARENTERAL at 08:23

## 2021-10-03 RX ADMIN — SERTRALINE 50 MG: 25 TABLET, FILM COATED ORAL at 08:23

## 2021-10-03 RX ADMIN — TORSEMIDE 100 MG: 20 TABLET ORAL at 17:16

## 2021-10-03 RX ADMIN — DICYCLOMINE HYDROCHLORIDE 10 MG: 10 CAPSULE ORAL at 21:54

## 2021-10-03 RX ADMIN — DICYCLOMINE HYDROCHLORIDE 10 MG: 10 CAPSULE ORAL at 13:05

## 2021-10-03 RX ADMIN — CALCIUM CARBONATE 625 MG: 1250 SUSPENSION ORAL at 17:17

## 2021-10-03 RX ADMIN — SEVELAMER HYDROCHLORIDE 800 MG: 800 TABLET, FILM COATED PARENTERAL at 17:17

## 2021-10-03 RX ADMIN — NYSTATIN: 100000 POWDER TOPICAL at 17:18

## 2021-10-03 RX ADMIN — INSULIN LISPRO 3 UNITS: 100 INJECTION, SOLUTION INTRAVENOUS; SUBCUTANEOUS at 08:23

## 2021-10-03 RX ADMIN — PANTOPRAZOLE SODIUM 40 MG: 40 TABLET, DELAYED RELEASE ORAL at 05:44

## 2021-10-03 RX ADMIN — INSULIN LISPRO 2 UNITS: 100 INJECTION, SOLUTION INTRAVENOUS; SUBCUTANEOUS at 17:17

## 2021-10-03 RX ADMIN — DIPHENHYDRAMINE HCL 25 MG: 25 TABLET ORAL at 00:23

## 2021-10-03 RX ADMIN — DICYCLOMINE HYDROCHLORIDE 10 MG: 10 CAPSULE ORAL at 17:17

## 2021-10-03 RX ADMIN — DOCUSATE SODIUM 100 MG: 100 CAPSULE, LIQUID FILLED ORAL at 08:23

## 2021-10-03 RX ADMIN — METHOCARBAMOL 500 MG: 500 TABLET, FILM COATED ORAL at 00:20

## 2021-10-03 RX ADMIN — HEPARIN SODIUM 10000 UNITS: 1000 INJECTION INTRAVENOUS; SUBCUTANEOUS at 18:32

## 2021-10-03 RX ADMIN — OXYCODONE HYDROCHLORIDE 10 MG: 5 SOLUTION ORAL at 05:42

## 2021-10-03 RX ADMIN — ATROPINE SULFATE 1 DROP: 10 SOLUTION/ DROPS OPHTHALMIC at 21:55

## 2021-10-03 RX ADMIN — DICYCLOMINE HYDROCHLORIDE 10 MG: 10 CAPSULE ORAL at 06:01

## 2021-10-03 RX ADMIN — LEVOTHYROXINE SODIUM 50 MCG: 50 TABLET ORAL at 05:44

## 2021-10-03 RX ADMIN — SEVELAMER HYDROCHLORIDE 800 MG: 800 TABLET, FILM COATED PARENTERAL at 13:04

## 2021-10-03 RX ADMIN — PREDNISOLONE ACETATE 1 DROP: 10 SUSPENSION/ DROPS OPHTHALMIC at 17:16

## 2021-10-03 RX ADMIN — CEFAZOLIN SODIUM 1000 MG: 1 SOLUTION INTRAVENOUS at 17:50

## 2021-10-03 RX ADMIN — METHOCARBAMOL 500 MG: 500 TABLET, FILM COATED ORAL at 05:44

## 2021-10-03 RX ADMIN — DICLOFENAC SODIUM 2 G: 10 GEL TOPICAL at 08:24

## 2021-10-03 RX ADMIN — HYDROMORPHONE HYDROCHLORIDE 0.5 MG: 1 INJECTION, SOLUTION INTRAMUSCULAR; INTRAVENOUS; SUBCUTANEOUS at 17:16

## 2021-10-04 ENCOUNTER — APPOINTMENT (INPATIENT)
Dept: DIALYSIS | Facility: HOSPITAL | Age: 56
DRG: 622 | End: 2021-10-04
Attending: INTERNAL MEDICINE
Payer: MEDICARE

## 2021-10-04 ENCOUNTER — APPOINTMENT (INPATIENT)
Dept: RADIOLOGY | Facility: HOSPITAL | Age: 56
DRG: 622 | End: 2021-10-04
Attending: RADIOLOGY
Payer: MEDICARE

## 2021-10-04 LAB
ANION GAP SERPL CALCULATED.3IONS-SCNC: 10 MMOL/L (ref 4–13)
APTT PPP: 173 SECONDS (ref 23–37)
APTT PPP: >210 SECONDS (ref 23–37)
APTT PPP: >210 SECONDS (ref 23–37)
BACTERIA SPEC BFLD CULT: ABNORMAL
BASOPHILS # BLD AUTO: 0.04 THOUSANDS/ΜL (ref 0–0.1)
BASOPHILS NFR BLD AUTO: 1 % (ref 0–1)
BUN SERPL-MCNC: 52 MG/DL (ref 5–25)
CALCIUM SERPL-MCNC: 8.9 MG/DL (ref 8.3–10.1)
CHLORIDE SERPL-SCNC: 93 MMOL/L (ref 100–108)
CO2 SERPL-SCNC: 26 MMOL/L (ref 21–32)
CREAT SERPL-MCNC: 7.53 MG/DL (ref 0.6–1.3)
EOSINOPHIL # BLD AUTO: 0.22 THOUSAND/ΜL (ref 0–0.61)
EOSINOPHIL NFR BLD AUTO: 3 % (ref 0–6)
ERYTHROCYTE [DISTWIDTH] IN BLOOD BY AUTOMATED COUNT: 13.3 % (ref 11.6–15.1)
GFR SERPL CREATININE-BSD FRML MDRD: 6 ML/MIN/1.73SQ M
GLUCOSE SERPL-MCNC: 113 MG/DL (ref 65–140)
GLUCOSE SERPL-MCNC: 131 MG/DL (ref 65–140)
GLUCOSE SERPL-MCNC: 171 MG/DL (ref 65–140)
GLUCOSE SERPL-MCNC: 171 MG/DL (ref 65–140)
GRAM STN SPEC: ABNORMAL
HCT VFR BLD AUTO: 26.6 % (ref 34.8–46.1)
HGB BLD-MCNC: 8.5 G/DL (ref 11.5–15.4)
IMM GRANULOCYTES # BLD AUTO: 0.05 THOUSAND/UL (ref 0–0.2)
IMM GRANULOCYTES NFR BLD AUTO: 1 % (ref 0–2)
INR PPP: 1.44 (ref 0.84–1.19)
LYMPHOCYTES # BLD AUTO: 1.11 THOUSANDS/ΜL (ref 0.6–4.47)
LYMPHOCYTES NFR BLD AUTO: 14 % (ref 14–44)
MCH RBC QN AUTO: 30.8 PG (ref 26.8–34.3)
MCHC RBC AUTO-ENTMCNC: 32 G/DL (ref 31.4–37.4)
MCV RBC AUTO: 96 FL (ref 82–98)
MONOCYTES # BLD AUTO: 0.79 THOUSAND/ΜL (ref 0.17–1.22)
MONOCYTES NFR BLD AUTO: 10 % (ref 4–12)
NEUTROPHILS # BLD AUTO: 5.71 THOUSANDS/ΜL (ref 1.85–7.62)
NEUTS SEG NFR BLD AUTO: 71 % (ref 43–75)
NRBC BLD AUTO-RTO: 0 /100 WBCS
PLATELET # BLD AUTO: 196 THOUSANDS/UL (ref 149–390)
PMV BLD AUTO: 10.6 FL (ref 8.9–12.7)
POTASSIUM SERPL-SCNC: 4.7 MMOL/L (ref 3.5–5.3)
PROTHROMBIN TIME: 16.9 SECONDS (ref 11.6–14.5)
RBC # BLD AUTO: 2.76 MILLION/UL (ref 3.81–5.12)
SODIUM SERPL-SCNC: 129 MMOL/L (ref 136–145)
WBC # BLD AUTO: 7.92 THOUSAND/UL (ref 4.31–10.16)

## 2021-10-04 PROCEDURE — 85730 THROMBOPLASTIN TIME PARTIAL: CPT | Performed by: PODIATRIST

## 2021-10-04 PROCEDURE — 90935 HEMODIALYSIS ONE EVALUATION: CPT | Performed by: INTERNAL MEDICINE

## 2021-10-04 PROCEDURE — 85730 THROMBOPLASTIN TIME PARTIAL: CPT | Performed by: INTERNAL MEDICINE

## 2021-10-04 PROCEDURE — 36556 INSERT NON-TUNNEL CV CATH: CPT

## 2021-10-04 PROCEDURE — 85610 PROTHROMBIN TIME: CPT | Performed by: NURSE PRACTITIONER

## 2021-10-04 PROCEDURE — C1894 INTRO/SHEATH, NON-LASER: HCPCS

## 2021-10-04 PROCEDURE — 76937 US GUIDE VASCULAR ACCESS: CPT | Performed by: RADIOLOGY

## 2021-10-04 PROCEDURE — 85730 THROMBOPLASTIN TIME PARTIAL: CPT | Performed by: NURSE PRACTITIONER

## 2021-10-04 PROCEDURE — 99232 SBSQ HOSP IP/OBS MODERATE 35: CPT | Performed by: NURSE PRACTITIONER

## 2021-10-04 PROCEDURE — 82948 REAGENT STRIP/BLOOD GLUCOSE: CPT

## 2021-10-04 PROCEDURE — 02HV33Z INSERTION OF INFUSION DEVICE INTO SUPERIOR VENA CAVA, PERCUTANEOUS APPROACH: ICD-10-PCS | Performed by: RADIOLOGY

## 2021-10-04 PROCEDURE — 99232 SBSQ HOSP IP/OBS MODERATE 35: CPT | Performed by: PODIATRIST

## 2021-10-04 PROCEDURE — 80048 BASIC METABOLIC PNL TOTAL CA: CPT | Performed by: NURSE PRACTITIONER

## 2021-10-04 PROCEDURE — NC001 PR NO CHARGE: Performed by: RADIOLOGY

## 2021-10-04 PROCEDURE — 36556 INSERT NON-TUNNEL CV CATH: CPT | Performed by: RADIOLOGY

## 2021-10-04 PROCEDURE — 77001 FLUOROGUIDE FOR VEIN DEVICE: CPT | Performed by: RADIOLOGY

## 2021-10-04 PROCEDURE — 85025 COMPLETE CBC W/AUTO DIFF WBC: CPT | Performed by: NURSE PRACTITIONER

## 2021-10-04 PROCEDURE — 77001 FLUOROGUIDE FOR VEIN DEVICE: CPT

## 2021-10-04 PROCEDURE — C1751 CATH, INF, PER/CENT/MIDLINE: HCPCS

## 2021-10-04 PROCEDURE — 76937 US GUIDE VASCULAR ACCESS: CPT

## 2021-10-04 RX ORDER — VANCOMYCIN HYDROCHLORIDE 500 MG/100ML
10 INJECTION, SOLUTION INTRAVENOUS
Status: DISCONTINUED | OUTPATIENT
Start: 2021-10-05 | End: 2021-10-07

## 2021-10-04 RX ORDER — INSULIN GLARGINE 100 [IU]/ML
15 INJECTION, SOLUTION SUBCUTANEOUS
Status: DISCONTINUED | OUTPATIENT
Start: 2021-10-05 | End: 2021-10-14

## 2021-10-04 RX ADMIN — PREDNISOLONE ACETATE 1 DROP: 10 SUSPENSION/ DROPS OPHTHALMIC at 12:13

## 2021-10-04 RX ADMIN — TORSEMIDE 100 MG: 20 TABLET ORAL at 17:19

## 2021-10-04 RX ADMIN — ATORVASTATIN CALCIUM 20 MG: 20 TABLET, FILM COATED ORAL at 17:19

## 2021-10-04 RX ADMIN — METHOCARBAMOL 500 MG: 500 TABLET, FILM COATED ORAL at 17:19

## 2021-10-04 RX ADMIN — DICYCLOMINE HYDROCHLORIDE 10 MG: 10 CAPSULE ORAL at 21:51

## 2021-10-04 RX ADMIN — CEFAZOLIN SODIUM 1000 MG: 1 SOLUTION INTRAVENOUS at 10:52

## 2021-10-04 RX ADMIN — DICYCLOMINE HYDROCHLORIDE 10 MG: 10 CAPSULE ORAL at 17:20

## 2021-10-04 RX ADMIN — GABAPENTIN 300 MG: 300 CAPSULE ORAL at 12:11

## 2021-10-04 RX ADMIN — HEPARIN SODIUM 19 UNITS/KG/HR: 10000 INJECTION, SOLUTION INTRAVENOUS at 05:46

## 2021-10-04 RX ADMIN — OXYCODONE HYDROCHLORIDE AND ACETAMINOPHEN 1 TABLET: 5; 325 TABLET ORAL at 04:59

## 2021-10-04 RX ADMIN — COLLAGENASE SANTYL 1 APPLICATION: 250 OINTMENT TOPICAL at 08:00

## 2021-10-04 RX ADMIN — B-COMPLEX W/ C & FOLIC ACID TAB 1 TABLET: TAB at 12:12

## 2021-10-04 RX ADMIN — METHOCARBAMOL 500 MG: 500 TABLET, FILM COATED ORAL at 21:51

## 2021-10-04 RX ADMIN — METHOCARBAMOL 500 MG: 500 TABLET, FILM COATED ORAL at 00:16

## 2021-10-04 RX ADMIN — METHOCARBAMOL 500 MG: 500 TABLET, FILM COATED ORAL at 05:01

## 2021-10-04 RX ADMIN — INSULIN LISPRO 2 UNITS: 100 INJECTION, SOLUTION INTRAVENOUS; SUBCUTANEOUS at 08:34

## 2021-10-04 RX ADMIN — OXYCODONE HYDROCHLORIDE 10 MG: 5 SOLUTION ORAL at 18:42

## 2021-10-04 RX ADMIN — METHOCARBAMOL 500 MG: 500 TABLET, FILM COATED ORAL at 12:11

## 2021-10-04 RX ADMIN — CINACALCET 30 MG: 30 TABLET, FILM COATED ORAL at 12:11

## 2021-10-04 RX ADMIN — INSULIN LISPRO 3 UNITS: 100 INJECTION, SOLUTION INTRAVENOUS; SUBCUTANEOUS at 17:19

## 2021-10-04 RX ADMIN — INSULIN GLARGINE 20 UNITS: 100 INJECTION, SOLUTION SUBCUTANEOUS at 21:51

## 2021-10-04 RX ADMIN — OXYCODONE HYDROCHLORIDE AND ACETAMINOPHEN 1 TABLET: 5; 325 TABLET ORAL at 09:33

## 2021-10-04 RX ADMIN — LEVOTHYROXINE SODIUM 50 MCG: 50 TABLET ORAL at 05:01

## 2021-10-04 RX ADMIN — DOCUSATE SODIUM 100 MG: 100 CAPSULE, LIQUID FILLED ORAL at 12:11

## 2021-10-04 RX ADMIN — SERTRALINE 50 MG: 25 TABLET, FILM COATED ORAL at 12:11

## 2021-10-04 RX ADMIN — PREDNISOLONE ACETATE 1 DROP: 10 SUSPENSION/ DROPS OPHTHALMIC at 21:51

## 2021-10-04 RX ADMIN — PREDNISOLONE ACETATE 1 DROP: 10 SUSPENSION/ DROPS OPHTHALMIC at 17:19

## 2021-10-04 RX ADMIN — HYDROMORPHONE HYDROCHLORIDE 0.5 MG: 1 INJECTION, SOLUTION INTRAMUSCULAR; INTRAVENOUS; SUBCUTANEOUS at 12:10

## 2021-10-04 RX ADMIN — VANCOMYCIN HYDROCHLORIDE 1250 MG: 10 INJECTION, POWDER, LYOPHILIZED, FOR SOLUTION INTRAVENOUS at 15:37

## 2021-10-04 RX ADMIN — LORATADINE 10 MG: 10 TABLET ORAL at 12:11

## 2021-10-04 RX ADMIN — CALCIUM CARBONATE 625 MG: 1250 SUSPENSION ORAL at 17:20

## 2021-10-04 RX ADMIN — DOCUSATE SODIUM 100 MG: 100 CAPSULE, LIQUID FILLED ORAL at 17:19

## 2021-10-04 RX ADMIN — PANTOPRAZOLE SODIUM 40 MG: 40 TABLET, DELAYED RELEASE ORAL at 05:01

## 2021-10-04 RX ADMIN — HYDROMORPHONE HYDROCHLORIDE 0.5 MG: 1 INJECTION, SOLUTION INTRAMUSCULAR; INTRAVENOUS; SUBCUTANEOUS at 21:52

## 2021-10-04 RX ADMIN — TORSEMIDE 100 MG: 20 TABLET ORAL at 05:01

## 2021-10-04 RX ADMIN — DICYCLOMINE HYDROCHLORIDE 10 MG: 10 CAPSULE ORAL at 06:21

## 2021-10-04 RX ADMIN — ALPRAZOLAM 0.25 MG: 0.25 TABLET ORAL at 09:34

## 2021-10-04 RX ADMIN — ATROPINE SULFATE 1 DROP: 10 SOLUTION/ DROPS OPHTHALMIC at 21:51

## 2021-10-04 RX ADMIN — NYSTATIN 1 APPLICATION: 100000 POWDER TOPICAL at 17:19

## 2021-10-04 RX ADMIN — SEVELAMER HYDROCHLORIDE 800 MG: 800 TABLET, FILM COATED PARENTERAL at 17:20

## 2021-10-05 ENCOUNTER — APPOINTMENT (INPATIENT)
Dept: RADIOLOGY | Facility: HOSPITAL | Age: 56
DRG: 622 | End: 2021-10-05
Payer: MEDICARE

## 2021-10-05 ENCOUNTER — APPOINTMENT (INPATIENT)
Dept: DIALYSIS | Facility: HOSPITAL | Age: 56
DRG: 622 | End: 2021-10-05
Payer: MEDICARE

## 2021-10-05 LAB
APTT PPP: 110 SECONDS (ref 23–37)
APTT PPP: 35 SECONDS (ref 23–37)
APTT PPP: 68 SECONDS (ref 23–37)
GLUCOSE SERPL-MCNC: 124 MG/DL (ref 65–140)
GLUCOSE SERPL-MCNC: 169 MG/DL (ref 65–140)
GLUCOSE SERPL-MCNC: 177 MG/DL (ref 65–140)
GLUCOSE SERPL-MCNC: 210 MG/DL (ref 65–140)

## 2021-10-05 PROCEDURE — 85730 THROMBOPLASTIN TIME PARTIAL: CPT | Performed by: PODIATRIST

## 2021-10-05 PROCEDURE — 90935 HEMODIALYSIS ONE EVALUATION: CPT | Performed by: INTERNAL MEDICINE

## 2021-10-05 PROCEDURE — A9569 TECHNETIUM TC-99M AUTO WBC: HCPCS

## 2021-10-05 PROCEDURE — 99232 SBSQ HOSP IP/OBS MODERATE 35: CPT | Performed by: PODIATRIST

## 2021-10-05 PROCEDURE — 99232 SBSQ HOSP IP/OBS MODERATE 35: CPT | Performed by: PHYSICIAN ASSISTANT

## 2021-10-05 PROCEDURE — G1004 CDSM NDSC: HCPCS

## 2021-10-05 PROCEDURE — 82948 REAGENT STRIP/BLOOD GLUCOSE: CPT

## 2021-10-05 PROCEDURE — 78800 RP LOCLZJ TUM 1 AREA 1 D IMG: CPT

## 2021-10-05 RX ORDER — CHOLECALCIFEROL (VITAMIN D3) 10 MCG
1 TABLET ORAL
Status: DISCONTINUED | OUTPATIENT
Start: 2021-10-06 | End: 2021-10-18 | Stop reason: HOSPADM

## 2021-10-05 RX ORDER — LIDOCAINE 50 MG/G
1 PATCH TOPICAL DAILY
Status: DISCONTINUED | OUTPATIENT
Start: 2021-10-05 | End: 2021-10-18 | Stop reason: HOSPADM

## 2021-10-05 RX ADMIN — INSULIN LISPRO 2 UNITS: 100 INJECTION, SOLUTION INTRAVENOUS; SUBCUTANEOUS at 13:42

## 2021-10-05 RX ADMIN — HYDROMORPHONE HYDROCHLORIDE 0.5 MG: 1 INJECTION, SOLUTION INTRAMUSCULAR; INTRAVENOUS; SUBCUTANEOUS at 13:27

## 2021-10-05 RX ADMIN — COLLAGENASE SANTYL: 250 OINTMENT TOPICAL at 13:19

## 2021-10-05 RX ADMIN — CALCIUM CARBONATE 625 MG: 1250 SUSPENSION ORAL at 18:34

## 2021-10-05 RX ADMIN — METHOCARBAMOL 500 MG: 500 TABLET, FILM COATED ORAL at 13:26

## 2021-10-05 RX ADMIN — PREDNISOLONE ACETATE 1 DROP: 10 SUSPENSION/ DROPS OPHTHALMIC at 13:29

## 2021-10-05 RX ADMIN — LEVOTHYROXINE SODIUM 50 MCG: 50 TABLET ORAL at 06:02

## 2021-10-05 RX ADMIN — TORSEMIDE 100 MG: 20 TABLET ORAL at 06:01

## 2021-10-05 RX ADMIN — INSULIN LISPRO 2 UNITS: 100 INJECTION, SOLUTION INTRAVENOUS; SUBCUTANEOUS at 18:37

## 2021-10-05 RX ADMIN — HYDROMORPHONE HYDROCHLORIDE 0.5 MG: 1 INJECTION, SOLUTION INTRAMUSCULAR; INTRAVENOUS; SUBCUTANEOUS at 08:35

## 2021-10-05 RX ADMIN — ATORVASTATIN CALCIUM 20 MG: 20 TABLET, FILM COATED ORAL at 18:34

## 2021-10-05 RX ADMIN — VANCOMYCIN HYDROCHLORIDE 500 MG: 500 INJECTION, SOLUTION INTRAVENOUS at 11:40

## 2021-10-05 RX ADMIN — OXYCODONE HYDROCHLORIDE AND ACETAMINOPHEN 1 TABLET: 5; 325 TABLET ORAL at 18:36

## 2021-10-05 RX ADMIN — PANTOPRAZOLE SODIUM 40 MG: 40 TABLET, DELAYED RELEASE ORAL at 06:02

## 2021-10-05 RX ADMIN — INSULIN LISPRO 1 UNITS: 100 INJECTION, SOLUTION INTRAVENOUS; SUBCUTANEOUS at 02:18

## 2021-10-05 RX ADMIN — OXYCODONE HYDROCHLORIDE 10 MG: 5 SOLUTION ORAL at 02:19

## 2021-10-05 RX ADMIN — SEVELAMER HYDROCHLORIDE 800 MG: 800 TABLET, FILM COATED PARENTERAL at 18:37

## 2021-10-05 RX ADMIN — METHOCARBAMOL 500 MG: 500 TABLET, FILM COATED ORAL at 06:02

## 2021-10-05 RX ADMIN — TORSEMIDE 100 MG: 20 TABLET ORAL at 18:34

## 2021-10-05 RX ADMIN — INSULIN LISPRO 3 UNITS: 100 INJECTION, SOLUTION INTRAVENOUS; SUBCUTANEOUS at 18:35

## 2021-10-05 RX ADMIN — LIDOCAINE 1 PATCH: 50 PATCH TOPICAL at 18:42

## 2021-10-05 RX ADMIN — LORATADINE 10 MG: 10 TABLET ORAL at 13:26

## 2021-10-05 RX ADMIN — PREDNISOLONE ACETATE 1 DROP: 10 SUSPENSION/ DROPS OPHTHALMIC at 22:16

## 2021-10-05 RX ADMIN — NYSTATIN: 100000 POWDER TOPICAL at 18:36

## 2021-10-05 RX ADMIN — PREDNISOLONE ACETATE 1 DROP: 10 SUSPENSION/ DROPS OPHTHALMIC at 18:37

## 2021-10-05 RX ADMIN — DICYCLOMINE HYDROCHLORIDE 10 MG: 10 CAPSULE ORAL at 22:12

## 2021-10-05 RX ADMIN — HEPARIN SODIUM 11 UNITS/KG/HR: 10000 INJECTION, SOLUTION INTRAVENOUS at 15:03

## 2021-10-05 RX ADMIN — METHOCARBAMOL 500 MG: 500 TABLET, FILM COATED ORAL at 23:11

## 2021-10-05 RX ADMIN — METHOCARBAMOL 500 MG: 500 TABLET, FILM COATED ORAL at 18:34

## 2021-10-05 RX ADMIN — ATROPINE SULFATE 1 DROP: 10 SOLUTION/ DROPS OPHTHALMIC at 22:16

## 2021-10-05 RX ADMIN — SERTRALINE 50 MG: 25 TABLET, FILM COATED ORAL at 13:26

## 2021-10-05 RX ADMIN — DICYCLOMINE HYDROCHLORIDE 10 MG: 10 CAPSULE ORAL at 18:35

## 2021-10-05 RX ADMIN — DOCUSATE SODIUM 100 MG: 100 CAPSULE, LIQUID FILLED ORAL at 18:34

## 2021-10-05 RX ADMIN — INSULIN GLARGINE 15 UNITS: 100 INJECTION, SOLUTION SUBCUTANEOUS at 22:16

## 2021-10-05 RX ADMIN — INSULIN LISPRO 3 UNITS: 100 INJECTION, SOLUTION INTRAVENOUS; SUBCUTANEOUS at 13:42

## 2021-10-05 RX ADMIN — GABAPENTIN 300 MG: 300 CAPSULE ORAL at 13:26

## 2021-10-05 RX ADMIN — DOXERCALCIFEROL 2 MCG: 4 INJECTION, SOLUTION INTRAVENOUS at 08:38

## 2021-10-05 RX ADMIN — SEVELAMER HYDROCHLORIDE 800 MG: 800 TABLET, FILM COATED PARENTERAL at 13:29

## 2021-10-05 RX ADMIN — ALPRAZOLAM 0.25 MG: 0.25 TABLET ORAL at 10:22

## 2021-10-05 RX ADMIN — DICYCLOMINE HYDROCHLORIDE 10 MG: 10 CAPSULE ORAL at 13:28

## 2021-10-05 RX ADMIN — CINACALCET 30 MG: 30 TABLET, FILM COATED ORAL at 13:27

## 2021-10-06 ENCOUNTER — APPOINTMENT (INPATIENT)
Dept: RADIOLOGY | Facility: HOSPITAL | Age: 56
DRG: 622 | End: 2021-10-06
Payer: MEDICARE

## 2021-10-06 LAB
ANION GAP SERPL CALCULATED.3IONS-SCNC: 7 MMOL/L (ref 4–13)
APTT PPP: 153 SECONDS (ref 23–37)
APTT PPP: 25 SECONDS (ref 23–37)
BUN SERPL-MCNC: 32 MG/DL (ref 5–25)
CALCIUM SERPL-MCNC: 9.3 MG/DL (ref 8.3–10.1)
CHLORIDE SERPL-SCNC: 97 MMOL/L (ref 100–108)
CO2 SERPL-SCNC: 27 MMOL/L (ref 21–32)
CREAT SERPL-MCNC: 5.3 MG/DL (ref 0.6–1.3)
ERYTHROCYTE [DISTWIDTH] IN BLOOD BY AUTOMATED COUNT: 13.3 % (ref 11.6–15.1)
GFR SERPL CREATININE-BSD FRML MDRD: 9 ML/MIN/1.73SQ M
GLUCOSE SERPL-MCNC: 116 MG/DL (ref 65–140)
GLUCOSE SERPL-MCNC: 151 MG/DL (ref 65–140)
GLUCOSE SERPL-MCNC: 156 MG/DL (ref 65–140)
GLUCOSE SERPL-MCNC: 160 MG/DL (ref 65–140)
GLUCOSE SERPL-MCNC: 187 MG/DL (ref 65–140)
GLUCOSE SERPL-MCNC: 217 MG/DL (ref 65–140)
HCT VFR BLD AUTO: 27.5 % (ref 34.8–46.1)
HGB BLD-MCNC: 8.7 G/DL (ref 11.5–15.4)
INR PPP: 1.05 (ref 0.84–1.19)
MCH RBC QN AUTO: 30.9 PG (ref 26.8–34.3)
MCHC RBC AUTO-ENTMCNC: 31.6 G/DL (ref 31.4–37.4)
MCV RBC AUTO: 98 FL (ref 82–98)
PLATELET # BLD AUTO: 236 THOUSANDS/UL (ref 149–390)
PMV BLD AUTO: 10.3 FL (ref 8.9–12.7)
POTASSIUM SERPL-SCNC: 4.5 MMOL/L (ref 3.5–5.3)
PROTHROMBIN TIME: 13.3 SECONDS (ref 11.6–14.5)
RBC # BLD AUTO: 2.82 MILLION/UL (ref 3.81–5.12)
SODIUM SERPL-SCNC: 131 MMOL/L (ref 136–145)
WBC # BLD AUTO: 9.17 THOUSAND/UL (ref 4.31–10.16)

## 2021-10-06 PROCEDURE — 85027 COMPLETE CBC AUTOMATED: CPT | Performed by: PHYSICIAN ASSISTANT

## 2021-10-06 PROCEDURE — 99232 SBSQ HOSP IP/OBS MODERATE 35: CPT | Performed by: PHYSICIAN ASSISTANT

## 2021-10-06 PROCEDURE — 85610 PROTHROMBIN TIME: CPT | Performed by: PHYSICIAN ASSISTANT

## 2021-10-06 PROCEDURE — 80048 BASIC METABOLIC PNL TOTAL CA: CPT | Performed by: PHYSICIAN ASSISTANT

## 2021-10-06 PROCEDURE — 99233 SBSQ HOSP IP/OBS HIGH 50: CPT | Performed by: INTERNAL MEDICINE

## 2021-10-06 PROCEDURE — 85730 THROMBOPLASTIN TIME PARTIAL: CPT | Performed by: PODIATRIST

## 2021-10-06 PROCEDURE — 82948 REAGENT STRIP/BLOOD GLUCOSE: CPT

## 2021-10-06 PROCEDURE — 99232 SBSQ HOSP IP/OBS MODERATE 35: CPT | Performed by: PODIATRIST

## 2021-10-06 RX ADMIN — CINACALCET 30 MG: 30 TABLET, FILM COATED ORAL at 08:53

## 2021-10-06 RX ADMIN — INSULIN LISPRO 4 UNITS: 100 INJECTION, SOLUTION INTRAVENOUS; SUBCUTANEOUS at 12:53

## 2021-10-06 RX ADMIN — DOCUSATE SODIUM 100 MG: 100 CAPSULE, LIQUID FILLED ORAL at 08:50

## 2021-10-06 RX ADMIN — INSULIN GLARGINE 15 UNITS: 100 INJECTION, SOLUTION SUBCUTANEOUS at 23:04

## 2021-10-06 RX ADMIN — SEVELAMER HYDROCHLORIDE 800 MG: 800 TABLET, FILM COATED PARENTERAL at 08:53

## 2021-10-06 RX ADMIN — COLLAGENASE SANTYL: 250 OINTMENT TOPICAL at 08:53

## 2021-10-06 RX ADMIN — METHOCARBAMOL 500 MG: 500 TABLET, FILM COATED ORAL at 12:53

## 2021-10-06 RX ADMIN — LIDOCAINE 1 PATCH: 50 PATCH TOPICAL at 08:50

## 2021-10-06 RX ADMIN — ATORVASTATIN CALCIUM 20 MG: 20 TABLET, FILM COATED ORAL at 17:14

## 2021-10-06 RX ADMIN — SERTRALINE 50 MG: 25 TABLET, FILM COATED ORAL at 08:50

## 2021-10-06 RX ADMIN — PREDNISOLONE ACETATE 1 DROP: 10 SUSPENSION/ DROPS OPHTHALMIC at 23:05

## 2021-10-06 RX ADMIN — LEVOTHYROXINE SODIUM 50 MCG: 50 TABLET ORAL at 05:40

## 2021-10-06 RX ADMIN — PANTOPRAZOLE SODIUM 40 MG: 40 TABLET, DELAYED RELEASE ORAL at 05:40

## 2021-10-06 RX ADMIN — CALCIUM CARBONATE 625 MG: 1250 SUSPENSION ORAL at 17:15

## 2021-10-06 RX ADMIN — PREDNISOLONE ACETATE 1 DROP: 10 SUSPENSION/ DROPS OPHTHALMIC at 12:53

## 2021-10-06 RX ADMIN — DICLOFENAC SODIUM 2 G: 10 GEL TOPICAL at 23:04

## 2021-10-06 RX ADMIN — Medication 1 CAPSULE: at 17:14

## 2021-10-06 RX ADMIN — METHOCARBAMOL 500 MG: 500 TABLET, FILM COATED ORAL at 05:40

## 2021-10-06 RX ADMIN — DICYCLOMINE HYDROCHLORIDE 10 MG: 10 CAPSULE ORAL at 23:04

## 2021-10-06 RX ADMIN — DICYCLOMINE HYDROCHLORIDE 10 MG: 10 CAPSULE ORAL at 17:15

## 2021-10-06 RX ADMIN — HYDROMORPHONE HYDROCHLORIDE 0.5 MG: 1 INJECTION, SOLUTION INTRAMUSCULAR; INTRAVENOUS; SUBCUTANEOUS at 17:14

## 2021-10-06 RX ADMIN — PREDNISOLONE ACETATE 1 DROP: 10 SUSPENSION/ DROPS OPHTHALMIC at 17:15

## 2021-10-06 RX ADMIN — OXYCODONE HYDROCHLORIDE AND ACETAMINOPHEN 1 TABLET: 5; 325 TABLET ORAL at 08:51

## 2021-10-06 RX ADMIN — INSULIN LISPRO 2 UNITS: 100 INJECTION, SOLUTION INTRAVENOUS; SUBCUTANEOUS at 08:52

## 2021-10-06 RX ADMIN — CALCIUM CARBONATE 625 MG: 1250 SUSPENSION ORAL at 08:52

## 2021-10-06 RX ADMIN — HYDROMORPHONE HYDROCHLORIDE 0.5 MG: 1 INJECTION, SOLUTION INTRAMUSCULAR; INTRAVENOUS; SUBCUTANEOUS at 10:09

## 2021-10-06 RX ADMIN — TORSEMIDE 100 MG: 20 TABLET ORAL at 17:14

## 2021-10-06 RX ADMIN — HEPARIN SODIUM 10000 UNITS: 1000 INJECTION INTRAVENOUS; SUBCUTANEOUS at 08:54

## 2021-10-06 RX ADMIN — DOCUSATE SODIUM 100 MG: 100 CAPSULE, LIQUID FILLED ORAL at 17:14

## 2021-10-06 RX ADMIN — INSULIN LISPRO 3 UNITS: 100 INJECTION, SOLUTION INTRAVENOUS; SUBCUTANEOUS at 17:15

## 2021-10-06 RX ADMIN — DICYCLOMINE HYDROCHLORIDE 10 MG: 10 CAPSULE ORAL at 08:54

## 2021-10-06 RX ADMIN — ALPRAZOLAM 0.25 MG: 0.25 TABLET ORAL at 19:43

## 2021-10-06 RX ADMIN — OXYCODONE HYDROCHLORIDE AND ACETAMINOPHEN 1 TABLET: 5; 325 TABLET ORAL at 12:53

## 2021-10-06 RX ADMIN — DIPHENHYDRAMINE HCL 25 MG: 25 TABLET ORAL at 23:15

## 2021-10-06 RX ADMIN — LORATADINE 10 MG: 10 TABLET ORAL at 08:50

## 2021-10-06 RX ADMIN — SEVELAMER HYDROCHLORIDE 800 MG: 800 TABLET, FILM COATED PARENTERAL at 17:15

## 2021-10-06 RX ADMIN — INSULIN LISPRO 3 UNITS: 100 INJECTION, SOLUTION INTRAVENOUS; SUBCUTANEOUS at 12:53

## 2021-10-06 RX ADMIN — DICYCLOMINE HYDROCHLORIDE 10 MG: 10 CAPSULE ORAL at 12:53

## 2021-10-06 RX ADMIN — GABAPENTIN 300 MG: 300 CAPSULE ORAL at 08:50

## 2021-10-06 RX ADMIN — HEPARIN SODIUM 15 UNITS/KG/HR: 10000 INJECTION, SOLUTION INTRAVENOUS at 10:06

## 2021-10-06 RX ADMIN — SEVELAMER HYDROCHLORIDE 800 MG: 800 TABLET, FILM COATED PARENTERAL at 12:53

## 2021-10-06 RX ADMIN — PREDNISOLONE ACETATE 1 DROP: 10 SUSPENSION/ DROPS OPHTHALMIC at 08:51

## 2021-10-06 RX ADMIN — NYSTATIN: 100000 POWDER TOPICAL at 08:51

## 2021-10-06 RX ADMIN — INSULIN LISPRO 3 UNITS: 100 INJECTION, SOLUTION INTRAVENOUS; SUBCUTANEOUS at 08:55

## 2021-10-06 RX ADMIN — ATROPINE SULFATE 1 DROP: 10 SOLUTION/ DROPS OPHTHALMIC at 23:05

## 2021-10-06 RX ADMIN — OXYCODONE HYDROCHLORIDE 10 MG: 5 SOLUTION ORAL at 23:09

## 2021-10-06 RX ADMIN — METHOCARBAMOL 500 MG: 500 TABLET, FILM COATED ORAL at 17:14

## 2021-10-06 RX ADMIN — METHOCARBAMOL 500 MG: 500 TABLET, FILM COATED ORAL at 23:11

## 2021-10-07 ENCOUNTER — APPOINTMENT (INPATIENT)
Dept: DIALYSIS | Facility: HOSPITAL | Age: 56
DRG: 622 | End: 2021-10-07
Attending: INTERNAL MEDICINE
Payer: MEDICARE

## 2021-10-07 LAB
ANION GAP SERPL CALCULATED.3IONS-SCNC: 7 MMOL/L (ref 4–13)
APTT PPP: 100 SECONDS (ref 23–37)
APTT PPP: 32 SECONDS (ref 23–37)
APTT PPP: 79 SECONDS (ref 23–37)
BUN SERPL-MCNC: 48 MG/DL (ref 5–25)
CALCIUM SERPL-MCNC: 9.3 MG/DL (ref 8.3–10.1)
CHLORIDE SERPL-SCNC: 94 MMOL/L (ref 100–108)
CO2 SERPL-SCNC: 29 MMOL/L (ref 21–32)
CREAT SERPL-MCNC: 7.72 MG/DL (ref 0.6–1.3)
CRP SERPL QL: 89.4 MG/L
ERYTHROCYTE [DISTWIDTH] IN BLOOD BY AUTOMATED COUNT: 13.5 % (ref 11.6–15.1)
GFR SERPL CREATININE-BSD FRML MDRD: 5 ML/MIN/1.73SQ M
GLUCOSE SERPL-MCNC: 156 MG/DL (ref 65–140)
GLUCOSE SERPL-MCNC: 246 MG/DL (ref 65–140)
GLUCOSE SERPL-MCNC: 253 MG/DL (ref 65–140)
GLUCOSE SERPL-MCNC: 256 MG/DL (ref 65–140)
HCT VFR BLD AUTO: 26.6 % (ref 34.8–46.1)
HGB BLD-MCNC: 8.4 G/DL (ref 11.5–15.4)
MCH RBC QN AUTO: 30.9 PG (ref 26.8–34.3)
MCHC RBC AUTO-ENTMCNC: 31.6 G/DL (ref 31.4–37.4)
MCV RBC AUTO: 98 FL (ref 82–98)
PLATELET # BLD AUTO: 248 THOUSANDS/UL (ref 149–390)
PMV BLD AUTO: 10.2 FL (ref 8.9–12.7)
POTASSIUM SERPL-SCNC: 4.2 MMOL/L (ref 3.5–5.3)
RBC # BLD AUTO: 2.72 MILLION/UL (ref 3.81–5.12)
SODIUM SERPL-SCNC: 130 MMOL/L (ref 136–145)
VANCOMYCIN SERPL-MCNC: 13.6 UG/ML
VIT B12 SERPL-MCNC: 661 PG/ML (ref 100–900)
WBC # BLD AUTO: 8.54 THOUSAND/UL (ref 4.31–10.16)

## 2021-10-07 PROCEDURE — 86140 C-REACTIVE PROTEIN: CPT | Performed by: INTERNAL MEDICINE

## 2021-10-07 PROCEDURE — 85027 COMPLETE CBC AUTOMATED: CPT | Performed by: PHYSICIAN ASSISTANT

## 2021-10-07 PROCEDURE — 99232 SBSQ HOSP IP/OBS MODERATE 35: CPT | Performed by: PHYSICIAN ASSISTANT

## 2021-10-07 PROCEDURE — 80202 ASSAY OF VANCOMYCIN: CPT | Performed by: INTERNAL MEDICINE

## 2021-10-07 PROCEDURE — 85652 RBC SED RATE AUTOMATED: CPT | Performed by: INTERNAL MEDICINE

## 2021-10-07 PROCEDURE — 82607 VITAMIN B-12: CPT | Performed by: INTERNAL MEDICINE

## 2021-10-07 PROCEDURE — 80048 BASIC METABOLIC PNL TOTAL CA: CPT | Performed by: PHYSICIAN ASSISTANT

## 2021-10-07 PROCEDURE — 82948 REAGENT STRIP/BLOOD GLUCOSE: CPT

## 2021-10-07 PROCEDURE — 99232 SBSQ HOSP IP/OBS MODERATE 35: CPT | Performed by: PODIATRIST

## 2021-10-07 PROCEDURE — 90935 HEMODIALYSIS ONE EVALUATION: CPT | Performed by: INTERNAL MEDICINE

## 2021-10-07 PROCEDURE — 85730 THROMBOPLASTIN TIME PARTIAL: CPT | Performed by: PODIATRIST

## 2021-10-07 RX ORDER — VANCOMYCIN HYDROCHLORIDE 1 G/200ML
10 INJECTION, SOLUTION INTRAVENOUS
Status: DISCONTINUED | OUTPATIENT
Start: 2021-10-07 | End: 2021-10-12 | Stop reason: DRUGHIGH

## 2021-10-07 RX ADMIN — DICLOFENAC SODIUM 2 G: 10 GEL TOPICAL at 22:56

## 2021-10-07 RX ADMIN — SEVELAMER HYDROCHLORIDE 800 MG: 800 TABLET, FILM COATED PARENTERAL at 13:37

## 2021-10-07 RX ADMIN — DICYCLOMINE HYDROCHLORIDE 10 MG: 10 CAPSULE ORAL at 16:16

## 2021-10-07 RX ADMIN — OXYCODONE HYDROCHLORIDE 10 MG: 5 SOLUTION ORAL at 20:24

## 2021-10-07 RX ADMIN — DOCUSATE SODIUM 100 MG: 100 CAPSULE, LIQUID FILLED ORAL at 17:34

## 2021-10-07 RX ADMIN — DIPHENHYDRAMINE HCL 25 MG: 25 TABLET ORAL at 20:24

## 2021-10-07 RX ADMIN — INSULIN GLARGINE 15 UNITS: 100 INJECTION, SOLUTION SUBCUTANEOUS at 22:56

## 2021-10-07 RX ADMIN — METHOCARBAMOL 500 MG: 500 TABLET, FILM COATED ORAL at 05:51

## 2021-10-07 RX ADMIN — INSULIN LISPRO 3 UNITS: 100 INJECTION, SOLUTION INTRAVENOUS; SUBCUTANEOUS at 08:12

## 2021-10-07 RX ADMIN — ALPRAZOLAM 0.25 MG: 0.25 TABLET ORAL at 08:05

## 2021-10-07 RX ADMIN — DICLOFENAC SODIUM 2 G: 10 GEL TOPICAL at 17:41

## 2021-10-07 RX ADMIN — PANTOPRAZOLE SODIUM 40 MG: 40 TABLET, DELAYED RELEASE ORAL at 05:51

## 2021-10-07 RX ADMIN — ATORVASTATIN CALCIUM 20 MG: 20 TABLET, FILM COATED ORAL at 17:34

## 2021-10-07 RX ADMIN — PREDNISOLONE ACETATE 1 DROP: 10 SUSPENSION/ DROPS OPHTHALMIC at 17:33

## 2021-10-07 RX ADMIN — ATROPINE SULFATE 1 DROP: 10 SOLUTION/ DROPS OPHTHALMIC at 22:55

## 2021-10-07 RX ADMIN — LIDOCAINE 1 PATCH: 50 PATCH TOPICAL at 13:36

## 2021-10-07 RX ADMIN — HEPARIN SODIUM 10000 UNITS: 1000 INJECTION INTRAVENOUS; SUBCUTANEOUS at 17:33

## 2021-10-07 RX ADMIN — HEPARIN SODIUM 12 UNITS/KG/HR: 10000 INJECTION, SOLUTION INTRAVENOUS at 03:11

## 2021-10-07 RX ADMIN — SEVELAMER HYDROCHLORIDE 800 MG: 800 TABLET, FILM COATED PARENTERAL at 16:16

## 2021-10-07 RX ADMIN — COLLAGENASE SANTYL: 250 OINTMENT TOPICAL at 13:38

## 2021-10-07 RX ADMIN — HYDROMORPHONE HYDROCHLORIDE 0.5 MG: 1 INJECTION, SOLUTION INTRAMUSCULAR; INTRAVENOUS; SUBCUTANEOUS at 23:04

## 2021-10-07 RX ADMIN — INSULIN LISPRO 3 UNITS: 100 INJECTION, SOLUTION INTRAVENOUS; SUBCUTANEOUS at 17:34

## 2021-10-07 RX ADMIN — LORATADINE 10 MG: 10 TABLET ORAL at 13:36

## 2021-10-07 RX ADMIN — INSULIN LISPRO 6 UNITS: 100 INJECTION, SOLUTION INTRAVENOUS; SUBCUTANEOUS at 17:35

## 2021-10-07 RX ADMIN — NYSTATIN: 100000 POWDER TOPICAL at 17:41

## 2021-10-07 RX ADMIN — PREDNISOLONE ACETATE 1 DROP: 10 SUSPENSION/ DROPS OPHTHALMIC at 13:37

## 2021-10-07 RX ADMIN — INSULIN LISPRO 6 UNITS: 100 INJECTION, SOLUTION INTRAVENOUS; SUBCUTANEOUS at 08:12

## 2021-10-07 RX ADMIN — DOCUSATE SODIUM 100 MG: 100 CAPSULE, LIQUID FILLED ORAL at 13:38

## 2021-10-07 RX ADMIN — TORSEMIDE 100 MG: 20 TABLET ORAL at 05:51

## 2021-10-07 RX ADMIN — METHOCARBAMOL 500 MG: 500 TABLET, FILM COATED ORAL at 17:34

## 2021-10-07 RX ADMIN — CALCIUM CARBONATE 625 MG: 1250 SUSPENSION ORAL at 16:16

## 2021-10-07 RX ADMIN — OXYCODONE HYDROCHLORIDE 10 MG: 5 SOLUTION ORAL at 16:20

## 2021-10-07 RX ADMIN — HYDROMORPHONE HYDROCHLORIDE 0.5 MG: 1 INJECTION, SOLUTION INTRAMUSCULAR; INTRAVENOUS; SUBCUTANEOUS at 10:56

## 2021-10-07 RX ADMIN — TORSEMIDE 100 MG: 20 TABLET ORAL at 17:33

## 2021-10-07 RX ADMIN — OXYCODONE HYDROCHLORIDE 10 MG: 5 SOLUTION ORAL at 08:11

## 2021-10-07 RX ADMIN — LEVOTHYROXINE SODIUM 50 MCG: 50 TABLET ORAL at 05:51

## 2021-10-07 RX ADMIN — DICYCLOMINE HYDROCHLORIDE 10 MG: 10 CAPSULE ORAL at 13:36

## 2021-10-07 RX ADMIN — Medication 1 CAPSULE: at 16:12

## 2021-10-07 RX ADMIN — GABAPENTIN 300 MG: 300 CAPSULE ORAL at 13:38

## 2021-10-07 RX ADMIN — PREDNISOLONE ACETATE 1 DROP: 10 SUSPENSION/ DROPS OPHTHALMIC at 22:55

## 2021-10-07 RX ADMIN — CALCIUM CARBONATE 625 MG: 1250 SUSPENSION ORAL at 08:03

## 2021-10-07 RX ADMIN — SERTRALINE 50 MG: 25 TABLET, FILM COATED ORAL at 13:36

## 2021-10-07 RX ADMIN — DOXERCALCIFEROL 2 MCG: 4 INJECTION, SOLUTION INTRAVENOUS at 10:20

## 2021-10-07 RX ADMIN — VANCOMYCIN HYDROCHLORIDE 1000 MG: 1 INJECTION, SOLUTION INTRAVENOUS at 11:36

## 2021-10-07 RX ADMIN — CINACALCET 30 MG: 30 TABLET, FILM COATED ORAL at 13:38

## 2021-10-07 RX ADMIN — METHOCARBAMOL 500 MG: 500 TABLET, FILM COATED ORAL at 13:42

## 2021-10-07 RX ADMIN — DICYCLOMINE HYDROCHLORIDE 10 MG: 10 CAPSULE ORAL at 22:56

## 2021-10-08 ENCOUNTER — ANESTHESIA EVENT (INPATIENT)
Dept: PERIOP | Facility: HOSPITAL | Age: 56
DRG: 622 | End: 2021-10-08
Payer: MEDICARE

## 2021-10-08 ENCOUNTER — ANESTHESIA (INPATIENT)
Dept: PERIOP | Facility: HOSPITAL | Age: 56
DRG: 622 | End: 2021-10-08
Payer: MEDICARE

## 2021-10-08 LAB
ANION GAP SERPL CALCULATED.3IONS-SCNC: 7 MMOL/L (ref 4–13)
APTT PPP: 117 SECONDS (ref 23–37)
APTT PPP: 56 SECONDS (ref 23–37)
BUN SERPL-MCNC: 31 MG/DL (ref 5–25)
CALCIUM SERPL-MCNC: 9.5 MG/DL (ref 8.3–10.1)
CHLORIDE SERPL-SCNC: 93 MMOL/L (ref 100–108)
CO2 SERPL-SCNC: 29 MMOL/L (ref 21–32)
CREAT SERPL-MCNC: 5.95 MG/DL (ref 0.6–1.3)
ERYTHROCYTE [DISTWIDTH] IN BLOOD BY AUTOMATED COUNT: 13.8 % (ref 11.6–15.1)
ERYTHROCYTE [SEDIMENTATION RATE] IN BLOOD: 56 MM/HOUR (ref 0–29)
GFR SERPL CREATININE-BSD FRML MDRD: 7 ML/MIN/1.73SQ M
GLUCOSE SERPL-MCNC: 136 MG/DL (ref 65–140)
GLUCOSE SERPL-MCNC: 146 MG/DL (ref 65–140)
GLUCOSE SERPL-MCNC: 155 MG/DL (ref 65–140)
GLUCOSE SERPL-MCNC: 184 MG/DL (ref 65–140)
GLUCOSE SERPL-MCNC: 188 MG/DL (ref 65–140)
GLUCOSE SERPL-MCNC: 240 MG/DL (ref 65–140)
HCT VFR BLD AUTO: 26.2 % (ref 34.8–46.1)
HGB BLD-MCNC: 8.3 G/DL (ref 11.5–15.4)
MCH RBC QN AUTO: 31.1 PG (ref 26.8–34.3)
MCHC RBC AUTO-ENTMCNC: 31.7 G/DL (ref 31.4–37.4)
MCV RBC AUTO: 98 FL (ref 82–98)
PLATELET # BLD AUTO: 261 THOUSANDS/UL (ref 149–390)
PMV BLD AUTO: 10.1 FL (ref 8.9–12.7)
POTASSIUM SERPL-SCNC: 4.3 MMOL/L (ref 3.5–5.3)
RBC # BLD AUTO: 2.67 MILLION/UL (ref 3.81–5.12)
SODIUM SERPL-SCNC: 129 MMOL/L (ref 136–145)
WBC # BLD AUTO: 9.34 THOUSAND/UL (ref 4.31–10.16)

## 2021-10-08 PROCEDURE — NC001 PR NO CHARGE: Performed by: PODIATRIST

## 2021-10-08 PROCEDURE — 87076 CULTURE ANAEROBE IDENT EACH: CPT | Performed by: PODIATRIST

## 2021-10-08 PROCEDURE — 82948 REAGENT STRIP/BLOOD GLUCOSE: CPT

## 2021-10-08 PROCEDURE — 99232 SBSQ HOSP IP/OBS MODERATE 35: CPT | Performed by: NURSE PRACTITIONER

## 2021-10-08 PROCEDURE — 2W1SX6Z COMPRESSION OF RIGHT FOOT USING PRESSURE DRESSING: ICD-10-PCS | Performed by: PODIATRIST

## 2021-10-08 PROCEDURE — 87181 SC STD AGAR DILUTION PER AGT: CPT | Performed by: PODIATRIST

## 2021-10-08 PROCEDURE — 87102 FUNGUS ISOLATION CULTURE: CPT | Performed by: PODIATRIST

## 2021-10-08 PROCEDURE — 99223 1ST HOSP IP/OBS HIGH 75: CPT | Performed by: INTERNAL MEDICINE

## 2021-10-08 PROCEDURE — 28022 EXPLORATION OF FOOT JOINT: CPT | Performed by: PODIATRIST

## 2021-10-08 PROCEDURE — 87116 MYCOBACTERIA CULTURE: CPT | Performed by: PODIATRIST

## 2021-10-08 PROCEDURE — 87186 SC STD MICRODIL/AGAR DIL: CPT | Performed by: PODIATRIST

## 2021-10-08 PROCEDURE — 87205 SMEAR GRAM STAIN: CPT | Performed by: PODIATRIST

## 2021-10-08 PROCEDURE — 0JBQ0ZZ EXCISION OF RIGHT FOOT SUBCUTANEOUS TISSUE AND FASCIA, OPEN APPROACH: ICD-10-PCS | Performed by: PODIATRIST

## 2021-10-08 PROCEDURE — 85730 THROMBOPLASTIN TIME PARTIAL: CPT | Performed by: PODIATRIST

## 2021-10-08 PROCEDURE — 87077 CULTURE AEROBIC IDENTIFY: CPT | Performed by: PODIATRIST

## 2021-10-08 PROCEDURE — 87206 SMEAR FLUORESCENT/ACID STAI: CPT | Performed by: PODIATRIST

## 2021-10-08 PROCEDURE — 80048 BASIC METABOLIC PNL TOTAL CA: CPT | Performed by: PHYSICIAN ASSISTANT

## 2021-10-08 PROCEDURE — 87075 CULTR BACTERIA EXCEPT BLOOD: CPT | Performed by: PODIATRIST

## 2021-10-08 PROCEDURE — 97605 NEG PRS WND THER DME<=50SQCM: CPT | Performed by: PODIATRIST

## 2021-10-08 PROCEDURE — 87070 CULTURE OTHR SPECIMN AEROBIC: CPT | Performed by: PODIATRIST

## 2021-10-08 PROCEDURE — 85027 COMPLETE CBC AUTOMATED: CPT | Performed by: PHYSICIAN ASSISTANT

## 2021-10-08 RX ORDER — MEPERIDINE HYDROCHLORIDE 25 MG/ML
12.5 INJECTION INTRAMUSCULAR; INTRAVENOUS; SUBCUTANEOUS
Status: DISCONTINUED | OUTPATIENT
Start: 2021-10-08 | End: 2021-10-08 | Stop reason: HOSPADM

## 2021-10-08 RX ORDER — SODIUM CHLORIDE 9 MG/ML
INJECTION, SOLUTION INTRAVENOUS CONTINUOUS PRN
Status: DISCONTINUED | OUTPATIENT
Start: 2021-10-08 | End: 2021-10-08

## 2021-10-08 RX ORDER — MIDAZOLAM HYDROCHLORIDE 2 MG/2ML
INJECTION, SOLUTION INTRAMUSCULAR; INTRAVENOUS AS NEEDED
Status: DISCONTINUED | OUTPATIENT
Start: 2021-10-08 | End: 2021-10-08

## 2021-10-08 RX ORDER — FENTANYL CITRATE/PF 50 MCG/ML
25 SYRINGE (ML) INJECTION
Status: DISCONTINUED | OUTPATIENT
Start: 2021-10-08 | End: 2021-10-08 | Stop reason: HOSPADM

## 2021-10-08 RX ORDER — ALBUTEROL SULFATE 2.5 MG/3ML
2.5 SOLUTION RESPIRATORY (INHALATION) ONCE AS NEEDED
Status: DISCONTINUED | OUTPATIENT
Start: 2021-10-08 | End: 2021-10-08 | Stop reason: HOSPADM

## 2021-10-08 RX ORDER — DEXMEDETOMIDINE HYDROCHLORIDE 100 UG/ML
INJECTION, SOLUTION INTRAVENOUS AS NEEDED
Status: DISCONTINUED | OUTPATIENT
Start: 2021-10-08 | End: 2021-10-08

## 2021-10-08 RX ORDER — EPHEDRINE SULFATE 50 MG/ML
INJECTION INTRAVENOUS AS NEEDED
Status: DISCONTINUED | OUTPATIENT
Start: 2021-10-08 | End: 2021-10-08

## 2021-10-08 RX ORDER — OXYCODONE HYDROCHLORIDE 5 MG/1
5 TABLET ORAL EVERY 4 HOURS PRN
Status: DISCONTINUED | OUTPATIENT
Start: 2021-10-08 | End: 2021-10-10

## 2021-10-08 RX ORDER — ONDANSETRON 2 MG/ML
4 INJECTION INTRAMUSCULAR; INTRAVENOUS ONCE AS NEEDED
Status: DISCONTINUED | OUTPATIENT
Start: 2021-10-08 | End: 2021-10-08 | Stop reason: HOSPADM

## 2021-10-08 RX ORDER — ALBUMIN, HUMAN INJ 5% 5 %
12.5 SOLUTION INTRAVENOUS ONCE
Status: COMPLETED | OUTPATIENT
Start: 2021-10-08 | End: 2021-10-08

## 2021-10-08 RX ORDER — HYDROMORPHONE HCL/PF 1 MG/ML
0.5 SYRINGE (ML) INJECTION
Status: DISCONTINUED | OUTPATIENT
Start: 2021-10-08 | End: 2021-10-08 | Stop reason: HOSPADM

## 2021-10-08 RX ORDER — PROMETHAZINE HYDROCHLORIDE 25 MG/ML
12.5 INJECTION, SOLUTION INTRAMUSCULAR; INTRAVENOUS ONCE AS NEEDED
Status: DISCONTINUED | OUTPATIENT
Start: 2021-10-08 | End: 2021-10-08 | Stop reason: HOSPADM

## 2021-10-08 RX ORDER — PROPOFOL 10 MG/ML
INJECTION, EMULSION INTRAVENOUS CONTINUOUS PRN
Status: DISCONTINUED | OUTPATIENT
Start: 2021-10-08 | End: 2021-10-08

## 2021-10-08 RX ADMIN — PREDNISOLONE ACETATE 1 DROP: 10 SUSPENSION/ DROPS OPHTHALMIC at 09:20

## 2021-10-08 RX ADMIN — SODIUM CHLORIDE: 9 INJECTION, SOLUTION INTRAVENOUS at 11:34

## 2021-10-08 RX ADMIN — HEPARIN SODIUM 14 UNITS/KG/HR: 10000 INJECTION, SOLUTION INTRAVENOUS at 00:37

## 2021-10-08 RX ADMIN — INSULIN GLARGINE 15 UNITS: 100 INJECTION, SOLUTION SUBCUTANEOUS at 22:01

## 2021-10-08 RX ADMIN — DEXMEDETOMIDINE 4 MCG: 100 INJECTION, SOLUTION, CONCENTRATE INTRAVENOUS at 11:45

## 2021-10-08 RX ADMIN — PANTOPRAZOLE SODIUM 40 MG: 40 TABLET, DELAYED RELEASE ORAL at 05:07

## 2021-10-08 RX ADMIN — Medication 1 CAPSULE: at 15:53

## 2021-10-08 RX ADMIN — CALCIUM CARBONATE 625 MG: 1250 SUSPENSION ORAL at 15:50

## 2021-10-08 RX ADMIN — ALTEPLASE 2 MG: 2.2 INJECTION, POWDER, LYOPHILIZED, FOR SOLUTION INTRAVENOUS at 23:55

## 2021-10-08 RX ADMIN — ATROPINE SULFATE 1 DROP: 10 SOLUTION/ DROPS OPHTHALMIC at 22:01

## 2021-10-08 RX ADMIN — METHOCARBAMOL 500 MG: 500 TABLET, FILM COATED ORAL at 23:55

## 2021-10-08 RX ADMIN — PHENYLEPHRINE HYDROCHLORIDE 80 MCG/MIN: 10 INJECTION INTRAVENOUS at 11:43

## 2021-10-08 RX ADMIN — MIDAZOLAM 1 MG: 1 INJECTION INTRAMUSCULAR; INTRAVENOUS at 11:43

## 2021-10-08 RX ADMIN — DICYCLOMINE HYDROCHLORIDE 10 MG: 10 CAPSULE ORAL at 15:51

## 2021-10-08 RX ADMIN — ALBUMIN (HUMAN) 12.5 G: 12.5 INJECTION, SOLUTION INTRAVENOUS at 13:18

## 2021-10-08 RX ADMIN — METHOCARBAMOL 500 MG: 500 TABLET, FILM COATED ORAL at 17:27

## 2021-10-08 RX ADMIN — SEVELAMER HYDROCHLORIDE 800 MG: 800 TABLET, FILM COATED PARENTERAL at 15:51

## 2021-10-08 RX ADMIN — LORATADINE 10 MG: 10 TABLET ORAL at 09:16

## 2021-10-08 RX ADMIN — EPHEDRINE SULFATE 50 MG: 50 INJECTION, SOLUTION INTRAVENOUS at 12:13

## 2021-10-08 RX ADMIN — METHOCARBAMOL 500 MG: 500 TABLET, FILM COATED ORAL at 05:07

## 2021-10-08 RX ADMIN — TORSEMIDE 100 MG: 20 TABLET ORAL at 17:34

## 2021-10-08 RX ADMIN — DICYCLOMINE HYDROCHLORIDE 10 MG: 10 CAPSULE ORAL at 22:01

## 2021-10-08 RX ADMIN — SERTRALINE 50 MG: 25 TABLET, FILM COATED ORAL at 09:16

## 2021-10-08 RX ADMIN — LEVOTHYROXINE SODIUM 50 MCG: 50 TABLET ORAL at 05:07

## 2021-10-08 RX ADMIN — INSULIN LISPRO 2 UNITS: 100 INJECTION, SOLUTION INTRAVENOUS; SUBCUTANEOUS at 15:56

## 2021-10-08 RX ADMIN — TORSEMIDE 100 MG: 20 TABLET ORAL at 05:07

## 2021-10-08 RX ADMIN — DICLOFENAC SODIUM 2 G: 10 GEL TOPICAL at 09:21

## 2021-10-08 RX ADMIN — ALTEPLASE 2 MG: 2.2 INJECTION, POWDER, LYOPHILIZED, FOR SOLUTION INTRAVENOUS at 23:56

## 2021-10-08 RX ADMIN — ALPRAZOLAM 0.25 MG: 0.25 TABLET ORAL at 05:53

## 2021-10-08 RX ADMIN — DEXMEDETOMIDINE 4 MCG: 100 INJECTION, SOLUTION, CONCENTRATE INTRAVENOUS at 11:42

## 2021-10-08 RX ADMIN — GABAPENTIN 300 MG: 300 CAPSULE ORAL at 09:16

## 2021-10-08 RX ADMIN — MIDAZOLAM 1 MG: 1 INJECTION INTRAMUSCULAR; INTRAVENOUS at 11:34

## 2021-10-08 RX ADMIN — PREDNISOLONE ACETATE 1 DROP: 10 SUSPENSION/ DROPS OPHTHALMIC at 22:01

## 2021-10-08 RX ADMIN — PROPOFOL 30 MCG/KG/MIN: 10 INJECTION, EMULSION INTRAVENOUS at 11:44

## 2021-10-08 RX ADMIN — LIDOCAINE 1 PATCH: 50 PATCH TOPICAL at 09:16

## 2021-10-08 RX ADMIN — DOCUSATE SODIUM 100 MG: 100 CAPSULE, LIQUID FILLED ORAL at 09:16

## 2021-10-08 RX ADMIN — ATORVASTATIN CALCIUM 20 MG: 20 TABLET, FILM COATED ORAL at 17:27

## 2021-10-08 RX ADMIN — OXYCODONE HYDROCHLORIDE 10 MG: 5 SOLUTION ORAL at 15:49

## 2021-10-08 RX ADMIN — OXYCODONE HYDROCHLORIDE 10 MG: 5 SOLUTION ORAL at 22:01

## 2021-10-08 RX ADMIN — INSULIN LISPRO 3 UNITS: 100 INJECTION, SOLUTION INTRAVENOUS; SUBCUTANEOUS at 15:56

## 2021-10-09 ENCOUNTER — APPOINTMENT (INPATIENT)
Dept: DIALYSIS | Facility: HOSPITAL | Age: 56
DRG: 622 | End: 2021-10-09
Payer: MEDICARE

## 2021-10-09 LAB
ANION GAP SERPL CALCULATED.3IONS-SCNC: 12 MMOL/L (ref 4–13)
APTT PPP: 130 SECONDS (ref 23–37)
APTT PPP: 23 SECONDS (ref 23–37)
APTT PPP: 83 SECONDS (ref 23–37)
BUN SERPL-MCNC: 43 MG/DL (ref 5–25)
CALCIUM SERPL-MCNC: 9.4 MG/DL (ref 8.3–10.1)
CHLORIDE SERPL-SCNC: 93 MMOL/L (ref 100–108)
CO2 SERPL-SCNC: 25 MMOL/L (ref 21–32)
CREAT SERPL-MCNC: 7.39 MG/DL (ref 0.6–1.3)
ERYTHROCYTE [DISTWIDTH] IN BLOOD BY AUTOMATED COUNT: 14.1 % (ref 11.6–15.1)
GFR SERPL CREATININE-BSD FRML MDRD: 6 ML/MIN/1.73SQ M
GLUCOSE SERPL-MCNC: 122 MG/DL (ref 65–140)
GLUCOSE SERPL-MCNC: 143 MG/DL (ref 65–140)
GLUCOSE SERPL-MCNC: 143 MG/DL (ref 65–140)
GLUCOSE SERPL-MCNC: 157 MG/DL (ref 65–140)
GLUCOSE SERPL-MCNC: 181 MG/DL (ref 65–140)
HCT VFR BLD AUTO: 25.6 % (ref 34.8–46.1)
HGB BLD-MCNC: 8.2 G/DL (ref 11.5–15.4)
MCH RBC QN AUTO: 31.5 PG (ref 26.8–34.3)
MCHC RBC AUTO-ENTMCNC: 32 G/DL (ref 31.4–37.4)
MCV RBC AUTO: 99 FL (ref 82–98)
PLATELET # BLD AUTO: 260 THOUSANDS/UL (ref 149–390)
PMV BLD AUTO: 10 FL (ref 8.9–12.7)
POTASSIUM SERPL-SCNC: 4.4 MMOL/L (ref 3.5–5.3)
RBC # BLD AUTO: 2.6 MILLION/UL (ref 3.81–5.12)
SODIUM SERPL-SCNC: 130 MMOL/L (ref 136–145)
WBC # BLD AUTO: 12.2 THOUSAND/UL (ref 4.31–10.16)

## 2021-10-09 PROCEDURE — 82948 REAGENT STRIP/BLOOD GLUCOSE: CPT

## 2021-10-09 PROCEDURE — 85730 THROMBOPLASTIN TIME PARTIAL: CPT | Performed by: PODIATRIST

## 2021-10-09 PROCEDURE — 99024 POSTOP FOLLOW-UP VISIT: CPT | Performed by: PODIATRIST

## 2021-10-09 PROCEDURE — 80048 BASIC METABOLIC PNL TOTAL CA: CPT | Performed by: NURSE PRACTITIONER

## 2021-10-09 PROCEDURE — 90935 HEMODIALYSIS ONE EVALUATION: CPT | Performed by: INTERNAL MEDICINE

## 2021-10-09 PROCEDURE — 99232 SBSQ HOSP IP/OBS MODERATE 35: CPT | Performed by: PHYSICIAN ASSISTANT

## 2021-10-09 PROCEDURE — 85027 COMPLETE CBC AUTOMATED: CPT | Performed by: NURSE PRACTITIONER

## 2021-10-09 RX ORDER — WARFARIN SODIUM 3 MG/1
9 TABLET ORAL
Status: DISCONTINUED | OUTPATIENT
Start: 2021-10-09 | End: 2021-10-12

## 2021-10-09 RX ORDER — ONDANSETRON 4 MG/1
4 TABLET, ORALLY DISINTEGRATING ORAL EVERY 6 HOURS PRN
Status: CANCELLED | OUTPATIENT
Start: 2021-10-09

## 2021-10-09 RX ADMIN — HYDROMORPHONE HYDROCHLORIDE 0.5 MG: 1 INJECTION, SOLUTION INTRAMUSCULAR; INTRAVENOUS; SUBCUTANEOUS at 12:52

## 2021-10-09 RX ADMIN — METHOCARBAMOL 500 MG: 500 TABLET, FILM COATED ORAL at 21:10

## 2021-10-09 RX ADMIN — PANTOPRAZOLE SODIUM 40 MG: 40 TABLET, DELAYED RELEASE ORAL at 05:18

## 2021-10-09 RX ADMIN — PREDNISOLONE ACETATE 1 DROP: 10 SUSPENSION/ DROPS OPHTHALMIC at 17:49

## 2021-10-09 RX ADMIN — HYDROMORPHONE HYDROCHLORIDE 0.5 MG: 1 INJECTION, SOLUTION INTRAMUSCULAR; INTRAVENOUS; SUBCUTANEOUS at 10:14

## 2021-10-09 RX ADMIN — DICYCLOMINE HYDROCHLORIDE 10 MG: 10 CAPSULE ORAL at 12:32

## 2021-10-09 RX ADMIN — HYDROMORPHONE HYDROCHLORIDE 0.5 MG: 1 INJECTION, SOLUTION INTRAMUSCULAR; INTRAVENOUS; SUBCUTANEOUS at 01:21

## 2021-10-09 RX ADMIN — SERTRALINE 50 MG: 25 TABLET, FILM COATED ORAL at 12:31

## 2021-10-09 RX ADMIN — ALPRAZOLAM 0.25 MG: 0.25 TABLET ORAL at 10:31

## 2021-10-09 RX ADMIN — ATORVASTATIN CALCIUM 20 MG: 20 TABLET, FILM COATED ORAL at 17:48

## 2021-10-09 RX ADMIN — INSULIN LISPRO 2 UNITS: 100 INJECTION, SOLUTION INTRAVENOUS; SUBCUTANEOUS at 07:30

## 2021-10-09 RX ADMIN — INSULIN LISPRO 2 UNITS: 100 INJECTION, SOLUTION INTRAVENOUS; SUBCUTANEOUS at 17:42

## 2021-10-09 RX ADMIN — CINACALCET 30 MG: 30 TABLET, FILM COATED ORAL at 12:33

## 2021-10-09 RX ADMIN — TORSEMIDE 100 MG: 20 TABLET ORAL at 05:18

## 2021-10-09 RX ADMIN — HEPARIN SODIUM 15 UNITS/KG/HR: 10000 INJECTION, SOLUTION INTRAVENOUS at 05:16

## 2021-10-09 RX ADMIN — DICYCLOMINE HYDROCHLORIDE 10 MG: 10 CAPSULE ORAL at 21:10

## 2021-10-09 RX ADMIN — DICYCLOMINE HYDROCHLORIDE 10 MG: 10 CAPSULE ORAL at 17:48

## 2021-10-09 RX ADMIN — WARFARIN SODIUM 9 MG: 3 TABLET ORAL at 17:52

## 2021-10-09 RX ADMIN — HEPARIN SODIUM 10000 UNITS: 1000 INJECTION INTRAVENOUS; SUBCUTANEOUS at 10:53

## 2021-10-09 RX ADMIN — DICLOFENAC SODIUM 2 G: 10 GEL TOPICAL at 21:18

## 2021-10-09 RX ADMIN — DICLOFENAC SODIUM 2 G: 10 GEL TOPICAL at 12:34

## 2021-10-09 RX ADMIN — NYSTATIN: 100000 POWDER TOPICAL at 17:49

## 2021-10-09 RX ADMIN — DICLOFENAC SODIUM 2 G: 10 GEL TOPICAL at 17:50

## 2021-10-09 RX ADMIN — INSULIN LISPRO 3 UNITS: 100 INJECTION, SOLUTION INTRAVENOUS; SUBCUTANEOUS at 12:39

## 2021-10-09 RX ADMIN — SEVELAMER HYDROCHLORIDE 800 MG: 800 TABLET, FILM COATED PARENTERAL at 12:32

## 2021-10-09 RX ADMIN — Medication 1 CAPSULE: at 17:48

## 2021-10-09 RX ADMIN — METHOCARBAMOL 500 MG: 500 TABLET, FILM COATED ORAL at 05:18

## 2021-10-09 RX ADMIN — METHOCARBAMOL 500 MG: 500 TABLET, FILM COATED ORAL at 12:28

## 2021-10-09 RX ADMIN — PREDNISOLONE ACETATE 1 DROP: 10 SUSPENSION/ DROPS OPHTHALMIC at 21:10

## 2021-10-09 RX ADMIN — GABAPENTIN 300 MG: 300 CAPSULE ORAL at 12:28

## 2021-10-09 RX ADMIN — HYDROMORPHONE HYDROCHLORIDE 0.5 MG: 1 INJECTION, SOLUTION INTRAMUSCULAR; INTRAVENOUS; SUBCUTANEOUS at 21:13

## 2021-10-09 RX ADMIN — TORSEMIDE 100 MG: 20 TABLET ORAL at 17:47

## 2021-10-09 RX ADMIN — SEVELAMER HYDROCHLORIDE 800 MG: 800 TABLET, FILM COATED PARENTERAL at 17:47

## 2021-10-09 RX ADMIN — HEPARIN SODIUM 10000 UNITS: 1000 INJECTION INTRAVENOUS; SUBCUTANEOUS at 04:09

## 2021-10-09 RX ADMIN — DOCUSATE SODIUM 100 MG: 100 CAPSULE, LIQUID FILLED ORAL at 17:46

## 2021-10-09 RX ADMIN — INSULIN LISPRO 3 UNITS: 100 INJECTION, SOLUTION INTRAVENOUS; SUBCUTANEOUS at 07:30

## 2021-10-09 RX ADMIN — LIDOCAINE 1 PATCH: 50 PATCH TOPICAL at 12:34

## 2021-10-09 RX ADMIN — LORATADINE 10 MG: 10 TABLET ORAL at 12:31

## 2021-10-09 RX ADMIN — VANCOMYCIN HYDROCHLORIDE 1000 MG: 1 INJECTION, SOLUTION INTRAVENOUS at 10:26

## 2021-10-09 RX ADMIN — CALCIUM CARBONATE 625 MG: 1250 SUSPENSION ORAL at 17:46

## 2021-10-09 RX ADMIN — ATROPINE SULFATE 1 DROP: 10 SOLUTION/ DROPS OPHTHALMIC at 21:10

## 2021-10-09 RX ADMIN — DOXERCALCIFEROL 2 MCG: 4 INJECTION, SOLUTION INTRAVENOUS at 08:20

## 2021-10-09 RX ADMIN — LEVOTHYROXINE SODIUM 50 MCG: 50 TABLET ORAL at 05:18

## 2021-10-09 RX ADMIN — METHOCARBAMOL 500 MG: 500 TABLET, FILM COATED ORAL at 17:46

## 2021-10-09 RX ADMIN — HEPARIN SODIUM 12 UNITS/KG/HR: 10000 INJECTION, SOLUTION INTRAVENOUS at 21:08

## 2021-10-10 LAB
ANION GAP SERPL CALCULATED.3IONS-SCNC: 5 MMOL/L (ref 4–13)
APTT PPP: 109 SECONDS (ref 23–37)
APTT PPP: 52 SECONDS (ref 23–37)
APTT PPP: 58 SECONDS (ref 23–37)
APTT PPP: 58 SECONDS (ref 23–37)
BASOPHILS # BLD MANUAL: 0.09 THOUSAND/UL (ref 0–0.1)
BASOPHILS NFR MAR MANUAL: 1 % (ref 0–1)
BUN SERPL-MCNC: 26 MG/DL (ref 5–25)
CALCIUM SERPL-MCNC: 9 MG/DL (ref 8.3–10.1)
CHLORIDE SERPL-SCNC: 96 MMOL/L (ref 100–108)
CO2 SERPL-SCNC: 30 MMOL/L (ref 21–32)
CREAT SERPL-MCNC: 5.23 MG/DL (ref 0.6–1.3)
EOSINOPHIL # BLD MANUAL: 0.28 THOUSAND/UL (ref 0–0.4)
EOSINOPHIL NFR BLD MANUAL: 3 % (ref 0–6)
ERYTHROCYTE [DISTWIDTH] IN BLOOD BY AUTOMATED COUNT: 14.2 % (ref 11.6–15.1)
GFR SERPL CREATININE-BSD FRML MDRD: 9 ML/MIN/1.73SQ M
GLUCOSE SERPL-MCNC: 139 MG/DL (ref 65–140)
GLUCOSE SERPL-MCNC: 169 MG/DL (ref 65–140)
GLUCOSE SERPL-MCNC: 244 MG/DL (ref 65–140)
GLUCOSE SERPL-MCNC: 249 MG/DL (ref 65–140)
HCT VFR BLD AUTO: 23 % (ref 34.8–46.1)
HGB BLD-MCNC: 7.3 G/DL (ref 11.5–15.4)
HYPERCHROMIA BLD QL SMEAR: PRESENT
INR PPP: 1.05 (ref 0.84–1.19)
LYMPHOCYTES # BLD AUTO: 0.47 THOUSAND/UL (ref 0.6–4.47)
LYMPHOCYTES # BLD AUTO: 5 % (ref 14–44)
MCH RBC QN AUTO: 31.3 PG (ref 26.8–34.3)
MCHC RBC AUTO-ENTMCNC: 31.7 G/DL (ref 31.4–37.4)
MCV RBC AUTO: 99 FL (ref 82–98)
MONOCYTES # BLD AUTO: 0.37 THOUSAND/UL (ref 0–1.22)
MONOCYTES NFR BLD: 4 % (ref 4–12)
MYELOCYTES NFR BLD MANUAL: 1 % (ref 0–1)
NEUTROPHILS # BLD MANUAL: 7.91 THOUSAND/UL (ref 1.85–7.62)
NEUTS BAND NFR BLD MANUAL: 1 % (ref 0–8)
NEUTS SEG NFR BLD AUTO: 84 % (ref 43–75)
NRBC BLD AUTO-RTO: 2 /100 WBC (ref 0–2)
PLATELET # BLD AUTO: 214 THOUSANDS/UL (ref 149–390)
PLATELET BLD QL SMEAR: ADEQUATE
PMV BLD AUTO: 9.8 FL (ref 8.9–12.7)
POTASSIUM SERPL-SCNC: 4.2 MMOL/L (ref 3.5–5.3)
PROTHROMBIN TIME: 13.3 SECONDS (ref 11.6–14.5)
RBC # BLD AUTO: 2.33 MILLION/UL (ref 3.81–5.12)
RBC MORPH BLD: PRESENT
SODIUM SERPL-SCNC: 131 MMOL/L (ref 136–145)
VARIANT LYMPHS # BLD AUTO: 1 %
WBC # BLD AUTO: 9.31 THOUSAND/UL (ref 4.31–10.16)

## 2021-10-10 PROCEDURE — 85007 BL SMEAR W/DIFF WBC COUNT: CPT | Performed by: PHYSICIAN ASSISTANT

## 2021-10-10 PROCEDURE — 80048 BASIC METABOLIC PNL TOTAL CA: CPT | Performed by: PHYSICIAN ASSISTANT

## 2021-10-10 PROCEDURE — 99232 SBSQ HOSP IP/OBS MODERATE 35: CPT | Performed by: INTERNAL MEDICINE

## 2021-10-10 PROCEDURE — 82948 REAGENT STRIP/BLOOD GLUCOSE: CPT

## 2021-10-10 PROCEDURE — 85730 THROMBOPLASTIN TIME PARTIAL: CPT | Performed by: PHYSICIAN ASSISTANT

## 2021-10-10 PROCEDURE — 85027 COMPLETE CBC AUTOMATED: CPT | Performed by: PHYSICIAN ASSISTANT

## 2021-10-10 PROCEDURE — 99024 POSTOP FOLLOW-UP VISIT: CPT | Performed by: PODIATRIST

## 2021-10-10 PROCEDURE — 85730 THROMBOPLASTIN TIME PARTIAL: CPT | Performed by: INTERNAL MEDICINE

## 2021-10-10 PROCEDURE — 85610 PROTHROMBIN TIME: CPT | Performed by: PHYSICIAN ASSISTANT

## 2021-10-10 PROCEDURE — 85730 THROMBOPLASTIN TIME PARTIAL: CPT | Performed by: NURSE PRACTITIONER

## 2021-10-10 RX ORDER — OXYCODONE HYDROCHLORIDE AND ACETAMINOPHEN 5; 325 MG/1; MG/1
1 TABLET ORAL EVERY 4 HOURS PRN
Status: DISCONTINUED | OUTPATIENT
Start: 2021-10-10 | End: 2021-10-18 | Stop reason: HOSPADM

## 2021-10-10 RX ORDER — MORPHINE SULFATE 15 MG/1
30 TABLET ORAL EVERY 4 HOURS PRN
Status: DISCONTINUED | OUTPATIENT
Start: 2021-10-10 | End: 2021-10-18 | Stop reason: HOSPADM

## 2021-10-10 RX ADMIN — METHOCARBAMOL 500 MG: 500 TABLET, FILM COATED ORAL at 12:00

## 2021-10-10 RX ADMIN — DICYCLOMINE HYDROCHLORIDE 10 MG: 10 CAPSULE ORAL at 12:00

## 2021-10-10 RX ADMIN — NIFEDIPINE 30 MG: 30 TABLET, FILM COATED, EXTENDED RELEASE ORAL at 08:42

## 2021-10-10 RX ADMIN — PREDNISOLONE ACETATE 1 DROP: 10 SUSPENSION/ DROPS OPHTHALMIC at 17:07

## 2021-10-10 RX ADMIN — METHOCARBAMOL 500 MG: 500 TABLET, FILM COATED ORAL at 17:05

## 2021-10-10 RX ADMIN — Medication 4 MG: at 02:00

## 2021-10-10 RX ADMIN — MORPHINE SULFATE 30 MG: 15 TABLET ORAL at 14:14

## 2021-10-10 RX ADMIN — PREDNISOLONE ACETATE 1 DROP: 10 SUSPENSION/ DROPS OPHTHALMIC at 08:29

## 2021-10-10 RX ADMIN — CINACALCET 30 MG: 30 TABLET, FILM COATED ORAL at 08:34

## 2021-10-10 RX ADMIN — SERTRALINE 50 MG: 25 TABLET, FILM COATED ORAL at 08:35

## 2021-10-10 RX ADMIN — TORSEMIDE 100 MG: 20 TABLET ORAL at 17:00

## 2021-10-10 RX ADMIN — DOCUSATE SODIUM 100 MG: 100 CAPSULE, LIQUID FILLED ORAL at 08:34

## 2021-10-10 RX ADMIN — INSULIN LISPRO 2 UNITS: 100 INJECTION, SOLUTION INTRAVENOUS; SUBCUTANEOUS at 16:56

## 2021-10-10 RX ADMIN — DICLOFENAC SODIUM 2 G: 10 GEL TOPICAL at 09:00

## 2021-10-10 RX ADMIN — DICLOFENAC SODIUM 2 G: 10 GEL TOPICAL at 12:04

## 2021-10-10 RX ADMIN — NYSTATIN: 100000 POWDER TOPICAL at 17:14

## 2021-10-10 RX ADMIN — WARFARIN SODIUM 9 MG: 3 TABLET ORAL at 17:07

## 2021-10-10 RX ADMIN — SEVELAMER HYDROCHLORIDE 800 MG: 800 TABLET, FILM COATED PARENTERAL at 11:59

## 2021-10-10 RX ADMIN — METHOCARBAMOL 500 MG: 500 TABLET, FILM COATED ORAL at 23:27

## 2021-10-10 RX ADMIN — HYDROMORPHONE HYDROCHLORIDE 0.5 MG: 1 INJECTION, SOLUTION INTRAMUSCULAR; INTRAVENOUS; SUBCUTANEOUS at 04:28

## 2021-10-10 RX ADMIN — TORSEMIDE 100 MG: 20 TABLET ORAL at 04:29

## 2021-10-10 RX ADMIN — LORATADINE 10 MG: 10 TABLET ORAL at 08:34

## 2021-10-10 RX ADMIN — OXYCODONE HYDROCHLORIDE 10 MG: 5 SOLUTION ORAL at 00:52

## 2021-10-10 RX ADMIN — ATORVASTATIN CALCIUM 20 MG: 20 TABLET, FILM COATED ORAL at 17:00

## 2021-10-10 RX ADMIN — CALCIUM CARBONATE 625 MG: 1250 SUSPENSION ORAL at 17:02

## 2021-10-10 RX ADMIN — SEVELAMER HYDROCHLORIDE 800 MG: 800 TABLET, FILM COATED PARENTERAL at 08:34

## 2021-10-10 RX ADMIN — PANTOPRAZOLE SODIUM 40 MG: 40 TABLET, DELAYED RELEASE ORAL at 04:29

## 2021-10-10 RX ADMIN — DICYCLOMINE HYDROCHLORIDE 10 MG: 10 CAPSULE ORAL at 17:06

## 2021-10-10 RX ADMIN — CALCIUM CARBONATE 625 MG: 1250 SUSPENSION ORAL at 08:31

## 2021-10-10 RX ADMIN — HYDROMORPHONE HYDROCHLORIDE 0.5 MG: 1 INJECTION, SOLUTION INTRAMUSCULAR; INTRAVENOUS; SUBCUTANEOUS at 10:32

## 2021-10-10 RX ADMIN — METHOCARBAMOL 500 MG: 500 TABLET, FILM COATED ORAL at 04:29

## 2021-10-10 RX ADMIN — HEPARIN SODIUM 14 UNITS/KG/HR: 10000 INJECTION, SOLUTION INTRAVENOUS at 09:43

## 2021-10-10 RX ADMIN — OXYCODONE HYDROCHLORIDE 10 MG: 5 SOLUTION ORAL at 08:42

## 2021-10-10 RX ADMIN — NYSTATIN: 100000 POWDER TOPICAL at 09:00

## 2021-10-10 RX ADMIN — ALPRAZOLAM 0.25 MG: 0.25 TABLET ORAL at 00:51

## 2021-10-10 RX ADMIN — DOCUSATE SODIUM 100 MG: 100 CAPSULE, LIQUID FILLED ORAL at 17:01

## 2021-10-10 RX ADMIN — MORPHINE SULFATE 30 MG: 15 TABLET ORAL at 20:15

## 2021-10-10 RX ADMIN — DICYCLOMINE HYDROCHLORIDE 10 MG: 10 CAPSULE ORAL at 21:51

## 2021-10-10 RX ADMIN — DICLOFENAC SODIUM 2 G: 10 GEL TOPICAL at 17:14

## 2021-10-10 RX ADMIN — PREDNISOLONE ACETATE 1 DROP: 10 SUSPENSION/ DROPS OPHTHALMIC at 12:04

## 2021-10-10 RX ADMIN — Medication 4 MG: at 06:50

## 2021-10-10 RX ADMIN — HEPARIN SODIUM 5000 UNITS: 1000 INJECTION INTRAVENOUS; SUBCUTANEOUS at 09:41

## 2021-10-10 RX ADMIN — PREDNISOLONE ACETATE 1 DROP: 10 SUSPENSION/ DROPS OPHTHALMIC at 21:52

## 2021-10-10 RX ADMIN — LEVOTHYROXINE SODIUM 50 MCG: 50 TABLET ORAL at 04:29

## 2021-10-10 RX ADMIN — INSULIN GLARGINE 15 UNITS: 100 INJECTION, SOLUTION SUBCUTANEOUS at 21:51

## 2021-10-10 RX ADMIN — GABAPENTIN 300 MG: 300 CAPSULE ORAL at 08:34

## 2021-10-10 RX ADMIN — HYDROMORPHONE HYDROCHLORIDE 0.5 MG: 1 INJECTION, SOLUTION INTRAMUSCULAR; INTRAVENOUS; SUBCUTANEOUS at 16:51

## 2021-10-10 RX ADMIN — COLLAGENASE SANTYL: 250 OINTMENT TOPICAL at 09:00

## 2021-10-10 RX ADMIN — Medication 1 CAPSULE: at 16:59

## 2021-10-10 RX ADMIN — ATROPINE SULFATE 1 DROP: 10 SOLUTION/ DROPS OPHTHALMIC at 21:52

## 2021-10-10 RX ADMIN — SEVELAMER HYDROCHLORIDE 800 MG: 800 TABLET, FILM COATED PARENTERAL at 17:05

## 2021-10-10 RX ADMIN — HEPARIN SODIUM 12 UNITS/KG/HR: 10000 INJECTION, SOLUTION INTRAVENOUS at 19:34

## 2021-10-10 RX ADMIN — HYDROMORPHONE HYDROCHLORIDE 0.5 MG: 1 INJECTION, SOLUTION INTRAMUSCULAR; INTRAVENOUS; SUBCUTANEOUS at 22:37

## 2021-10-10 RX ADMIN — DICYCLOMINE HYDROCHLORIDE 10 MG: 10 CAPSULE ORAL at 08:37

## 2021-10-10 RX ADMIN — INSULIN LISPRO 4 UNITS: 100 INJECTION, SOLUTION INTRAVENOUS; SUBCUTANEOUS at 08:29

## 2021-10-11 LAB
ANION GAP SERPL CALCULATED.3IONS-SCNC: 7 MMOL/L (ref 4–13)
APTT PPP: 108 SECONDS (ref 23–37)
APTT PPP: 44 SECONDS (ref 23–37)
APTT PPP: >210 SECONDS (ref 23–37)
BACTERIA TISS AEROBE CULT: NO GROWTH
BUN SERPL-MCNC: 45 MG/DL (ref 5–25)
CALCIUM SERPL-MCNC: 9.8 MG/DL (ref 8.3–10.1)
CHLORIDE SERPL-SCNC: 96 MMOL/L (ref 100–108)
CO2 SERPL-SCNC: 27 MMOL/L (ref 21–32)
CREAT SERPL-MCNC: 7.81 MG/DL (ref 0.6–1.3)
ERYTHROCYTE [DISTWIDTH] IN BLOOD BY AUTOMATED COUNT: 14.4 % (ref 11.6–15.1)
GFR SERPL CREATININE-BSD FRML MDRD: 5 ML/MIN/1.73SQ M
GLUCOSE SERPL-MCNC: 110 MG/DL (ref 65–140)
GLUCOSE SERPL-MCNC: 118 MG/DL (ref 65–140)
GLUCOSE SERPL-MCNC: 158 MG/DL (ref 65–140)
GLUCOSE SERPL-MCNC: 181 MG/DL (ref 65–140)
GLUCOSE SERPL-MCNC: 77 MG/DL (ref 65–140)
GLUCOSE SERPL-MCNC: 84 MG/DL (ref 65–140)
GRAM STN SPEC: NORMAL
GRAM STN SPEC: NORMAL
HCT VFR BLD AUTO: 24.4 % (ref 34.8–46.1)
HGB BLD-MCNC: 7.8 G/DL (ref 11.5–15.4)
INR PPP: 1.22 (ref 0.84–1.19)
MCH RBC QN AUTO: 31.6 PG (ref 26.8–34.3)
MCHC RBC AUTO-ENTMCNC: 32 G/DL (ref 31.4–37.4)
MCV RBC AUTO: 99 FL (ref 82–98)
PLATELET # BLD AUTO: 230 THOUSANDS/UL (ref 149–390)
PMV BLD AUTO: 9.7 FL (ref 8.9–12.7)
POTASSIUM SERPL-SCNC: 4.5 MMOL/L (ref 3.5–5.3)
PROTHROMBIN TIME: 14.9 SECONDS (ref 11.6–14.5)
RBC # BLD AUTO: 2.47 MILLION/UL (ref 3.81–5.12)
SODIUM SERPL-SCNC: 130 MMOL/L (ref 136–145)
WBC # BLD AUTO: 12.99 THOUSAND/UL (ref 4.31–10.16)

## 2021-10-11 PROCEDURE — 85730 THROMBOPLASTIN TIME PARTIAL: CPT | Performed by: PODIATRIST

## 2021-10-11 PROCEDURE — 82948 REAGENT STRIP/BLOOD GLUCOSE: CPT

## 2021-10-11 PROCEDURE — 85027 COMPLETE CBC AUTOMATED: CPT | Performed by: STUDENT IN AN ORGANIZED HEALTH CARE EDUCATION/TRAINING PROGRAM

## 2021-10-11 PROCEDURE — 99232 SBSQ HOSP IP/OBS MODERATE 35: CPT | Performed by: INTERNAL MEDICINE

## 2021-10-11 PROCEDURE — 85730 THROMBOPLASTIN TIME PARTIAL: CPT | Performed by: INTERNAL MEDICINE

## 2021-10-11 PROCEDURE — 97605 NEG PRS WND THER DME<=50SQCM: CPT | Performed by: PODIATRIST

## 2021-10-11 PROCEDURE — 85610 PROTHROMBIN TIME: CPT | Performed by: HOSPITALIST

## 2021-10-11 PROCEDURE — 99233 SBSQ HOSP IP/OBS HIGH 50: CPT | Performed by: HOSPITALIST

## 2021-10-11 PROCEDURE — 80048 BASIC METABOLIC PNL TOTAL CA: CPT | Performed by: STUDENT IN AN ORGANIZED HEALTH CARE EDUCATION/TRAINING PROGRAM

## 2021-10-11 RX ORDER — HYDRALAZINE HYDROCHLORIDE 20 MG/ML
5 INJECTION INTRAMUSCULAR; INTRAVENOUS EVERY 6 HOURS PRN
Status: DISCONTINUED | OUTPATIENT
Start: 2021-10-11 | End: 2021-10-18 | Stop reason: HOSPADM

## 2021-10-11 RX ORDER — CEFAZOLIN SODIUM 1 G/50ML
1000 SOLUTION INTRAVENOUS EVERY 24 HOURS
Status: COMPLETED | OUTPATIENT
Start: 2021-10-11 | End: 2021-10-13

## 2021-10-11 RX ORDER — HYDRALAZINE HYDROCHLORIDE 20 MG/ML
5 INJECTION INTRAMUSCULAR; INTRAVENOUS EVERY 6 HOURS PRN
Status: DISCONTINUED | OUTPATIENT
Start: 2021-10-11 | End: 2021-10-11

## 2021-10-11 RX ORDER — METRONIDAZOLE 500 MG/1
500 TABLET ORAL EVERY 8 HOURS SCHEDULED
Status: DISCONTINUED | OUTPATIENT
Start: 2021-10-11 | End: 2021-10-11

## 2021-10-11 RX ADMIN — Medication 1 CAPSULE: at 15:50

## 2021-10-11 RX ADMIN — METHOCARBAMOL 500 MG: 500 TABLET, FILM COATED ORAL at 05:13

## 2021-10-11 RX ADMIN — NYSTATIN: 100000 POWDER TOPICAL at 09:28

## 2021-10-11 RX ADMIN — MORPHINE SULFATE 30 MG: 15 TABLET ORAL at 05:22

## 2021-10-11 RX ADMIN — INSULIN GLARGINE 15 UNITS: 100 INJECTION, SOLUTION SUBCUTANEOUS at 22:22

## 2021-10-11 RX ADMIN — METHOCARBAMOL 500 MG: 500 TABLET, FILM COATED ORAL at 17:20

## 2021-10-11 RX ADMIN — HEPARIN SODIUM 11 UNITS/KG/HR: 10000 INJECTION, SOLUTION INTRAVENOUS at 14:23

## 2021-10-11 RX ADMIN — SEVELAMER HYDROCHLORIDE 800 MG: 800 TABLET, FILM COATED PARENTERAL at 09:21

## 2021-10-11 RX ADMIN — PREDNISOLONE ACETATE 1 DROP: 10 SUSPENSION/ DROPS OPHTHALMIC at 17:13

## 2021-10-11 RX ADMIN — DICLOFENAC SODIUM 2 G: 10 GEL TOPICAL at 11:28

## 2021-10-11 RX ADMIN — HYDRALAZINE HYDROCHLORIDE 5 MG: 20 INJECTION INTRAMUSCULAR; INTRAVENOUS at 23:51

## 2021-10-11 RX ADMIN — MORPHINE SULFATE 30 MG: 15 TABLET ORAL at 14:22

## 2021-10-11 RX ADMIN — MORPHINE SULFATE 30 MG: 15 TABLET ORAL at 23:19

## 2021-10-11 RX ADMIN — PREDNISOLONE ACETATE 1 DROP: 10 SUSPENSION/ DROPS OPHTHALMIC at 09:22

## 2021-10-11 RX ADMIN — DOCUSATE SODIUM 100 MG: 100 CAPSULE, LIQUID FILLED ORAL at 09:26

## 2021-10-11 RX ADMIN — DICLOFENAC SODIUM 2 G: 10 GEL TOPICAL at 17:13

## 2021-10-11 RX ADMIN — ALPRAZOLAM 0.25 MG: 0.25 TABLET ORAL at 23:19

## 2021-10-11 RX ADMIN — CALCIUM CARBONATE 625 MG: 1250 SUSPENSION ORAL at 09:21

## 2021-10-11 RX ADMIN — INSULIN LISPRO 2 UNITS: 100 INJECTION, SOLUTION INTRAVENOUS; SUBCUTANEOUS at 17:13

## 2021-10-11 RX ADMIN — WARFARIN SODIUM 9 MG: 3 TABLET ORAL at 17:20

## 2021-10-11 RX ADMIN — CEFAZOLIN SODIUM 1000 MG: 1 SOLUTION INTRAVENOUS at 17:22

## 2021-10-11 RX ADMIN — PREDNISOLONE ACETATE 1 DROP: 10 SUSPENSION/ DROPS OPHTHALMIC at 11:12

## 2021-10-11 RX ADMIN — SEVELAMER HYDROCHLORIDE 800 MG: 800 TABLET, FILM COATED PARENTERAL at 11:12

## 2021-10-11 RX ADMIN — ATORVASTATIN CALCIUM 20 MG: 20 TABLET, FILM COATED ORAL at 17:20

## 2021-10-11 RX ADMIN — DOCUSATE SODIUM 100 MG: 100 CAPSULE, LIQUID FILLED ORAL at 17:20

## 2021-10-11 RX ADMIN — DICYCLOMINE HYDROCHLORIDE 10 MG: 10 CAPSULE ORAL at 15:47

## 2021-10-11 RX ADMIN — NIFEDIPINE 30 MG: 30 TABLET, FILM COATED, EXTENDED RELEASE ORAL at 09:26

## 2021-10-11 RX ADMIN — LORATADINE 10 MG: 10 TABLET ORAL at 09:26

## 2021-10-11 RX ADMIN — TORSEMIDE 100 MG: 20 TABLET ORAL at 17:22

## 2021-10-11 RX ADMIN — PANTOPRAZOLE SODIUM 40 MG: 40 TABLET, DELAYED RELEASE ORAL at 05:13

## 2021-10-11 RX ADMIN — METRONIDAZOLE 500 MG: 500 TABLET ORAL at 09:30

## 2021-10-11 RX ADMIN — INSULIN LISPRO 2 UNITS: 100 INJECTION, SOLUTION INTRAVENOUS; SUBCUTANEOUS at 09:27

## 2021-10-11 RX ADMIN — HYDROMORPHONE HYDROCHLORIDE 0.5 MG: 1 INJECTION, SOLUTION INTRAMUSCULAR; INTRAVENOUS; SUBCUTANEOUS at 15:51

## 2021-10-11 RX ADMIN — LEVOTHYROXINE SODIUM 50 MCG: 50 TABLET ORAL at 05:13

## 2021-10-11 RX ADMIN — HYDROMORPHONE HYDROCHLORIDE 0.5 MG: 1 INJECTION, SOLUTION INTRAMUSCULAR; INTRAVENOUS; SUBCUTANEOUS at 11:28

## 2021-10-11 RX ADMIN — DICYCLOMINE HYDROCHLORIDE 10 MG: 10 CAPSULE ORAL at 05:13

## 2021-10-11 RX ADMIN — ALPRAZOLAM 0.25 MG: 0.25 TABLET ORAL at 11:11

## 2021-10-11 RX ADMIN — ATROPINE SULFATE 1 DROP: 10 SOLUTION/ DROPS OPHTHALMIC at 22:22

## 2021-10-11 RX ADMIN — SEVELAMER HYDROCHLORIDE 800 MG: 800 TABLET, FILM COATED PARENTERAL at 15:47

## 2021-10-11 RX ADMIN — GABAPENTIN 300 MG: 300 CAPSULE ORAL at 09:26

## 2021-10-11 RX ADMIN — PREDNISOLONE ACETATE 1 DROP: 10 SUSPENSION/ DROPS OPHTHALMIC at 22:22

## 2021-10-11 RX ADMIN — DICLOFENAC SODIUM 2 G: 10 GEL TOPICAL at 22:26

## 2021-10-11 RX ADMIN — NYSTATIN: 100000 POWDER TOPICAL at 17:14

## 2021-10-11 RX ADMIN — MORPHINE SULFATE 30 MG: 15 TABLET ORAL at 09:41

## 2021-10-11 RX ADMIN — CINACALCET 30 MG: 30 TABLET, FILM COATED ORAL at 09:21

## 2021-10-11 RX ADMIN — METHOCARBAMOL 500 MG: 500 TABLET, FILM COATED ORAL at 23:20

## 2021-10-11 RX ADMIN — CALCIUM CARBONATE 625 MG: 1250 SUSPENSION ORAL at 15:47

## 2021-10-11 RX ADMIN — SERTRALINE 50 MG: 25 TABLET, FILM COATED ORAL at 09:26

## 2021-10-11 RX ADMIN — DICYCLOMINE HYDROCHLORIDE 10 MG: 10 CAPSULE ORAL at 22:22

## 2021-10-11 RX ADMIN — HEPARIN SODIUM 5000 UNITS: 1000 INJECTION INTRAVENOUS; SUBCUTANEOUS at 00:40

## 2021-10-11 RX ADMIN — TORSEMIDE 100 MG: 20 TABLET ORAL at 05:13

## 2021-10-11 RX ADMIN — METHOCARBAMOL 500 MG: 500 TABLET, FILM COATED ORAL at 11:28

## 2021-10-12 ENCOUNTER — APPOINTMENT (INPATIENT)
Dept: DIALYSIS | Facility: HOSPITAL | Age: 56
DRG: 622 | End: 2021-10-12
Payer: MEDICARE

## 2021-10-12 LAB
ANION GAP SERPL CALCULATED.3IONS-SCNC: 10 MMOL/L (ref 4–13)
APTT PPP: 65 SECONDS (ref 23–37)
APTT PPP: 87 SECONDS (ref 23–37)
BUN SERPL-MCNC: 57 MG/DL (ref 5–25)
CALCIUM SERPL-MCNC: 9.8 MG/DL (ref 8.3–10.1)
CHLORIDE SERPL-SCNC: 94 MMOL/L (ref 100–108)
CO2 SERPL-SCNC: 23 MMOL/L (ref 21–32)
CREAT SERPL-MCNC: 8.98 MG/DL (ref 0.6–1.3)
ERYTHROCYTE [DISTWIDTH] IN BLOOD BY AUTOMATED COUNT: 14.7 % (ref 11.6–15.1)
GFR SERPL CREATININE-BSD FRML MDRD: 5 ML/MIN/1.73SQ M
GLUCOSE SERPL-MCNC: 126 MG/DL (ref 65–140)
GLUCOSE SERPL-MCNC: 156 MG/DL (ref 65–140)
GLUCOSE SERPL-MCNC: 166 MG/DL (ref 65–140)
GLUCOSE SERPL-MCNC: 78 MG/DL (ref 65–140)
GLUCOSE SERPL-MCNC: 84 MG/DL (ref 65–140)
HCT VFR BLD AUTO: 24.8 % (ref 34.8–46.1)
HGB BLD-MCNC: 8 G/DL (ref 11.5–15.4)
INR PPP: 1.18 (ref 0.84–1.19)
MCH RBC QN AUTO: 31.9 PG (ref 26.8–34.3)
MCHC RBC AUTO-ENTMCNC: 32.3 G/DL (ref 31.4–37.4)
MCV RBC AUTO: 99 FL (ref 82–98)
PLATELET # BLD AUTO: 239 THOUSANDS/UL (ref 149–390)
PMV BLD AUTO: 10 FL (ref 8.9–12.7)
POTASSIUM SERPL-SCNC: 4.9 MMOL/L (ref 3.5–5.3)
PROTHROMBIN TIME: 14.5 SECONDS (ref 11.6–14.5)
RBC # BLD AUTO: 2.51 MILLION/UL (ref 3.81–5.12)
SODIUM SERPL-SCNC: 127 MMOL/L (ref 136–145)
VANCOMYCIN SERPL-MCNC: 19.8 UG/ML
WBC # BLD AUTO: 11.6 THOUSAND/UL (ref 4.31–10.16)

## 2021-10-12 PROCEDURE — 80048 BASIC METABOLIC PNL TOTAL CA: CPT | Performed by: STUDENT IN AN ORGANIZED HEALTH CARE EDUCATION/TRAINING PROGRAM

## 2021-10-12 PROCEDURE — 99233 SBSQ HOSP IP/OBS HIGH 50: CPT | Performed by: INTERNAL MEDICINE

## 2021-10-12 PROCEDURE — 85730 THROMBOPLASTIN TIME PARTIAL: CPT | Performed by: PODIATRIST

## 2021-10-12 PROCEDURE — 99233 SBSQ HOSP IP/OBS HIGH 50: CPT | Performed by: HOSPITALIST

## 2021-10-12 PROCEDURE — 82948 REAGENT STRIP/BLOOD GLUCOSE: CPT

## 2021-10-12 PROCEDURE — 85027 COMPLETE CBC AUTOMATED: CPT | Performed by: STUDENT IN AN ORGANIZED HEALTH CARE EDUCATION/TRAINING PROGRAM

## 2021-10-12 PROCEDURE — 99024 POSTOP FOLLOW-UP VISIT: CPT | Performed by: PODIATRIST

## 2021-10-12 PROCEDURE — 90935 HEMODIALYSIS ONE EVALUATION: CPT | Performed by: INTERNAL MEDICINE

## 2021-10-12 PROCEDURE — 85610 PROTHROMBIN TIME: CPT | Performed by: HOSPITALIST

## 2021-10-12 PROCEDURE — 80202 ASSAY OF VANCOMYCIN: CPT

## 2021-10-12 RX ADMIN — INSULIN LISPRO 2 UNITS: 100 INJECTION, SOLUTION INTRAVENOUS; SUBCUTANEOUS at 14:12

## 2021-10-12 RX ADMIN — PREDNISOLONE ACETATE 1 DROP: 10 SUSPENSION/ DROPS OPHTHALMIC at 17:28

## 2021-10-12 RX ADMIN — DOXERCALCIFEROL 2 MCG: 4 INJECTION, SOLUTION INTRAVENOUS at 09:24

## 2021-10-12 RX ADMIN — PANTOPRAZOLE SODIUM 40 MG: 40 TABLET, DELAYED RELEASE ORAL at 05:36

## 2021-10-12 RX ADMIN — HEPARIN SODIUM 5000 UNITS: 1000 INJECTION INTRAVENOUS; SUBCUTANEOUS at 00:50

## 2021-10-12 RX ADMIN — VANCOMYCIN HYDROCHLORIDE 1250 MG: 10 INJECTION, POWDER, LYOPHILIZED, FOR SOLUTION INTRAVENOUS at 12:21

## 2021-10-12 RX ADMIN — DICYCLOMINE HYDROCHLORIDE 10 MG: 10 CAPSULE ORAL at 17:29

## 2021-10-12 RX ADMIN — Medication 1 CAPSULE: at 17:27

## 2021-10-12 RX ADMIN — LORATADINE 10 MG: 10 TABLET ORAL at 13:59

## 2021-10-12 RX ADMIN — NYSTATIN: 100000 POWDER TOPICAL at 17:29

## 2021-10-12 RX ADMIN — INSULIN GLARGINE 15 UNITS: 100 INJECTION, SOLUTION SUBCUTANEOUS at 21:32

## 2021-10-12 RX ADMIN — CALCIUM CARBONATE 625 MG: 1250 SUSPENSION ORAL at 17:29

## 2021-10-12 RX ADMIN — PREDNISOLONE ACETATE 1 DROP: 10 SUSPENSION/ DROPS OPHTHALMIC at 21:34

## 2021-10-12 RX ADMIN — DICYCLOMINE HYDROCHLORIDE 10 MG: 10 CAPSULE ORAL at 14:03

## 2021-10-12 RX ADMIN — GABAPENTIN 300 MG: 300 CAPSULE ORAL at 13:59

## 2021-10-12 RX ADMIN — ALPRAZOLAM 0.25 MG: 0.25 TABLET ORAL at 09:24

## 2021-10-12 RX ADMIN — ATROPINE SULFATE 1 DROP: 10 SOLUTION/ DROPS OPHTHALMIC at 21:34

## 2021-10-12 RX ADMIN — WARFARIN SODIUM 12 MG: 5 TABLET ORAL at 17:29

## 2021-10-12 RX ADMIN — PREDNISOLONE ACETATE 1 DROP: 10 SUSPENSION/ DROPS OPHTHALMIC at 08:19

## 2021-10-12 RX ADMIN — METHOCARBAMOL 500 MG: 500 TABLET, FILM COATED ORAL at 17:29

## 2021-10-12 RX ADMIN — MORPHINE SULFATE 15 MG: 15 TABLET ORAL at 08:19

## 2021-10-12 RX ADMIN — SEVELAMER HYDROCHLORIDE 800 MG: 800 TABLET, FILM COATED PARENTERAL at 14:02

## 2021-10-12 RX ADMIN — PREDNISOLONE ACETATE 1 DROP: 10 SUSPENSION/ DROPS OPHTHALMIC at 14:03

## 2021-10-12 RX ADMIN — DOCUSATE SODIUM 100 MG: 100 CAPSULE, LIQUID FILLED ORAL at 17:27

## 2021-10-12 RX ADMIN — LIDOCAINE 1 PATCH: 50 PATCH TOPICAL at 08:19

## 2021-10-12 RX ADMIN — METHOCARBAMOL 500 MG: 500 TABLET, FILM COATED ORAL at 23:49

## 2021-10-12 RX ADMIN — SERTRALINE 50 MG: 25 TABLET, FILM COATED ORAL at 13:59

## 2021-10-12 RX ADMIN — DIPHENHYDRAMINE HCL 25 MG: 25 TABLET ORAL at 11:03

## 2021-10-12 RX ADMIN — LEVOTHYROXINE SODIUM 50 MCG: 50 TABLET ORAL at 05:36

## 2021-10-12 RX ADMIN — DICYCLOMINE HYDROCHLORIDE 10 MG: 10 CAPSULE ORAL at 05:35

## 2021-10-12 RX ADMIN — HEPARIN SODIUM 11 UNITS/KG/HR: 10000 INJECTION, SOLUTION INTRAVENOUS at 14:50

## 2021-10-12 RX ADMIN — TORSEMIDE 100 MG: 20 TABLET ORAL at 05:36

## 2021-10-12 RX ADMIN — DICYCLOMINE HYDROCHLORIDE 10 MG: 10 CAPSULE ORAL at 21:32

## 2021-10-12 RX ADMIN — NIFEDIPINE 30 MG: 30 TABLET, FILM COATED, EXTENDED RELEASE ORAL at 13:59

## 2021-10-12 RX ADMIN — Medication 4 MG: at 14:52

## 2021-10-12 RX ADMIN — ATORVASTATIN CALCIUM 20 MG: 20 TABLET, FILM COATED ORAL at 17:27

## 2021-10-12 RX ADMIN — SEVELAMER HYDROCHLORIDE 800 MG: 800 TABLET, FILM COATED PARENTERAL at 17:29

## 2021-10-12 RX ADMIN — METHOCARBAMOL 500 MG: 500 TABLET, FILM COATED ORAL at 05:36

## 2021-10-12 RX ADMIN — CEFAZOLIN SODIUM 1000 MG: 1 SOLUTION INTRAVENOUS at 17:43

## 2021-10-12 RX ADMIN — DICYCLOMINE HYDROCHLORIDE 10 MG: 10 CAPSULE ORAL at 21:33

## 2021-10-12 RX ADMIN — TORSEMIDE 100 MG: 20 TABLET ORAL at 17:27

## 2021-10-12 RX ADMIN — NYSTATIN: 100000 POWDER TOPICAL at 08:11

## 2021-10-12 RX ADMIN — DOCUSATE SODIUM 100 MG: 100 CAPSULE, LIQUID FILLED ORAL at 13:59

## 2021-10-12 RX ADMIN — CINACALCET 30 MG: 30 TABLET, FILM COATED ORAL at 14:03

## 2021-10-12 RX ADMIN — HYDROMORPHONE HYDROCHLORIDE 0.5 MG: 1 INJECTION, SOLUTION INTRAMUSCULAR; INTRAVENOUS; SUBCUTANEOUS at 17:49

## 2021-10-12 RX ADMIN — METHOCARBAMOL 500 MG: 500 TABLET, FILM COATED ORAL at 13:59

## 2021-10-12 RX ADMIN — HYDROMORPHONE HYDROCHLORIDE 0.5 MG: 1 INJECTION, SOLUTION INTRAMUSCULAR; INTRAVENOUS; SUBCUTANEOUS at 14:00

## 2021-10-12 RX ADMIN — MORPHINE SULFATE 30 MG: 15 TABLET ORAL at 15:10

## 2021-10-13 LAB
ANION GAP SERPL CALCULATED.3IONS-SCNC: 5 MMOL/L (ref 4–13)
ANISOCYTOSIS BLD QL SMEAR: PRESENT
APTT PPP: 110 SECONDS (ref 23–37)
APTT PPP: 36 SECONDS (ref 23–37)
APTT PPP: 50 SECONDS (ref 23–37)
BACTERIA SPEC ANAEROBE CULT: ABNORMAL
BASOPHILS # BLD MANUAL: 0 THOUSAND/UL (ref 0–0.1)
BASOPHILS NFR MAR MANUAL: 0 % (ref 0–1)
BUN SERPL-MCNC: 34 MG/DL (ref 5–25)
CALCIUM SERPL-MCNC: 9.4 MG/DL (ref 8.3–10.1)
CHLORIDE SERPL-SCNC: 97 MMOL/L (ref 100–108)
CO2 SERPL-SCNC: 27 MMOL/L (ref 21–32)
CREAT SERPL-MCNC: 6.48 MG/DL (ref 0.6–1.3)
DACRYOCYTES BLD QL SMEAR: PRESENT
EOSINOPHIL # BLD MANUAL: 0.43 THOUSAND/UL (ref 0–0.4)
EOSINOPHIL NFR BLD MANUAL: 4 % (ref 0–6)
ERYTHROCYTE [DISTWIDTH] IN BLOOD BY AUTOMATED COUNT: 15.1 % (ref 11.6–15.1)
GFR SERPL CREATININE-BSD FRML MDRD: 7 ML/MIN/1.73SQ M
GLUCOSE SERPL-MCNC: 119 MG/DL (ref 65–140)
GLUCOSE SERPL-MCNC: 136 MG/DL (ref 65–140)
GLUCOSE SERPL-MCNC: 149 MG/DL (ref 65–140)
GLUCOSE SERPL-MCNC: 165 MG/DL (ref 65–140)
GLUCOSE SERPL-MCNC: 173 MG/DL (ref 65–140)
HCT VFR BLD AUTO: 22.8 % (ref 34.8–46.1)
HGB BLD-MCNC: 7.2 G/DL (ref 11.5–15.4)
HYPERCHROMIA BLD QL SMEAR: PRESENT
INR PPP: 1.27 (ref 0.84–1.19)
LYMPHOCYTES # BLD AUTO: 1.17 THOUSAND/UL (ref 0.6–4.47)
LYMPHOCYTES # BLD AUTO: 11 % (ref 14–44)
MACROCYTES BLD QL AUTO: PRESENT
MCH RBC QN AUTO: 31.4 PG (ref 26.8–34.3)
MCHC RBC AUTO-ENTMCNC: 31.6 G/DL (ref 31.4–37.4)
MCV RBC AUTO: 100 FL (ref 82–98)
METAMYELOCYTES NFR BLD MANUAL: 2 % (ref 0–1)
MONOCYTES # BLD AUTO: 0.43 THOUSAND/UL (ref 0–1.22)
MONOCYTES NFR BLD: 4 % (ref 4–12)
MYELOCYTES NFR BLD MANUAL: 1 % (ref 0–1)
NEUTROPHILS # BLD MANUAL: 8.19 THOUSAND/UL (ref 1.85–7.62)
NEUTS BAND NFR BLD MANUAL: 2 % (ref 0–8)
NEUTS SEG NFR BLD AUTO: 75 % (ref 43–75)
PLATELET # BLD AUTO: 185 THOUSANDS/UL (ref 149–390)
PLATELET BLD QL SMEAR: ADEQUATE
PMV BLD AUTO: 9.6 FL (ref 8.9–12.7)
POIKILOCYTOSIS BLD QL SMEAR: PRESENT
POLYCHROMASIA BLD QL SMEAR: PRESENT
POTASSIUM SERPL-SCNC: 4.7 MMOL/L (ref 3.5–5.3)
PROTHROMBIN TIME: 15.3 SECONDS (ref 11.6–14.5)
RBC # BLD AUTO: 2.29 MILLION/UL (ref 3.81–5.12)
RBC MORPH BLD: PRESENT
SODIUM SERPL-SCNC: 129 MMOL/L (ref 136–145)
VARIANT LYMPHS # BLD AUTO: 1 %
WBC # BLD AUTO: 10.63 THOUSAND/UL (ref 4.31–10.16)

## 2021-10-13 PROCEDURE — 85007 BL SMEAR W/DIFF WBC COUNT: CPT

## 2021-10-13 PROCEDURE — 85730 THROMBOPLASTIN TIME PARTIAL: CPT | Performed by: PODIATRIST

## 2021-10-13 PROCEDURE — 99233 SBSQ HOSP IP/OBS HIGH 50: CPT | Performed by: HOSPITALIST

## 2021-10-13 PROCEDURE — 97605 NEG PRS WND THER DME<=50SQCM: CPT | Performed by: PODIATRIST

## 2021-10-13 PROCEDURE — 99233 SBSQ HOSP IP/OBS HIGH 50: CPT | Performed by: INTERNAL MEDICINE

## 2021-10-13 PROCEDURE — 85610 PROTHROMBIN TIME: CPT | Performed by: HOSPITALIST

## 2021-10-13 PROCEDURE — 82948 REAGENT STRIP/BLOOD GLUCOSE: CPT

## 2021-10-13 PROCEDURE — 85027 COMPLETE CBC AUTOMATED: CPT

## 2021-10-13 PROCEDURE — 99232 SBSQ HOSP IP/OBS MODERATE 35: CPT | Performed by: INTERNAL MEDICINE

## 2021-10-13 PROCEDURE — 80048 BASIC METABOLIC PNL TOTAL CA: CPT

## 2021-10-13 RX ORDER — FAMOTIDINE 20 MG/1
10 TABLET, FILM COATED ORAL DAILY PRN
Status: DISCONTINUED | OUTPATIENT
Start: 2021-10-13 | End: 2021-10-18 | Stop reason: HOSPADM

## 2021-10-13 RX ORDER — CEFAZOLIN SODIUM 2 G/50ML
2000 SOLUTION INTRAVENOUS 2 TIMES WEEKLY
Status: DISCONTINUED | OUTPATIENT
Start: 2021-10-14 | End: 2021-10-18 | Stop reason: HOSPADM

## 2021-10-13 RX ADMIN — HEPARIN SODIUM 5000 UNITS: 1000 INJECTION INTRAVENOUS; SUBCUTANEOUS at 12:16

## 2021-10-13 RX ADMIN — ALPRAZOLAM 0.25 MG: 0.25 TABLET ORAL at 10:11

## 2021-10-13 RX ADMIN — CALCIUM CARBONATE 625 MG: 1250 SUSPENSION ORAL at 10:12

## 2021-10-13 RX ADMIN — HEPARIN SODIUM 13 UNITS/KG/HR: 10000 INJECTION, SOLUTION INTRAVENOUS at 13:57

## 2021-10-13 RX ADMIN — ATROPINE SULFATE 1 DROP: 10 SOLUTION/ DROPS OPHTHALMIC at 21:51

## 2021-10-13 RX ADMIN — DOCUSATE SODIUM 100 MG: 100 CAPSULE, LIQUID FILLED ORAL at 18:06

## 2021-10-13 RX ADMIN — METHOCARBAMOL 500 MG: 500 TABLET, FILM COATED ORAL at 05:18

## 2021-10-13 RX ADMIN — WARFARIN SODIUM 12 MG: 5 TABLET ORAL at 18:05

## 2021-10-13 RX ADMIN — DOCUSATE SODIUM 100 MG: 100 CAPSULE, LIQUID FILLED ORAL at 10:11

## 2021-10-13 RX ADMIN — MORPHINE SULFATE 30 MG: 15 TABLET ORAL at 12:21

## 2021-10-13 RX ADMIN — DICYCLOMINE HYDROCHLORIDE 10 MG: 10 CAPSULE ORAL at 21:50

## 2021-10-13 RX ADMIN — HYDROMORPHONE HYDROCHLORIDE 0.5 MG: 1 INJECTION, SOLUTION INTRAMUSCULAR; INTRAVENOUS; SUBCUTANEOUS at 21:52

## 2021-10-13 RX ADMIN — NYSTATIN: 100000 POWDER TOPICAL at 18:22

## 2021-10-13 RX ADMIN — PANTOPRAZOLE SODIUM 40 MG: 40 TABLET, DELAYED RELEASE ORAL at 05:18

## 2021-10-13 RX ADMIN — METHOCARBAMOL 500 MG: 500 TABLET, FILM COATED ORAL at 23:33

## 2021-10-13 RX ADMIN — HYDROMORPHONE HYDROCHLORIDE 0.5 MG: 1 INJECTION, SOLUTION INTRAMUSCULAR; INTRAVENOUS; SUBCUTANEOUS at 14:07

## 2021-10-13 RX ADMIN — PREDNISOLONE ACETATE 1 DROP: 10 SUSPENSION/ DROPS OPHTHALMIC at 10:12

## 2021-10-13 RX ADMIN — LIDOCAINE 1 PATCH: 50 PATCH TOPICAL at 10:12

## 2021-10-13 RX ADMIN — DICYCLOMINE HYDROCHLORIDE 10 MG: 10 CAPSULE ORAL at 12:22

## 2021-10-13 RX ADMIN — HYDROMORPHONE HYDROCHLORIDE 0.5 MG: 1 INJECTION, SOLUTION INTRAMUSCULAR; INTRAVENOUS; SUBCUTANEOUS at 08:38

## 2021-10-13 RX ADMIN — HEPARIN SODIUM 11 UNITS/KG/HR: 10000 INJECTION, SOLUTION INTRAVENOUS at 10:22

## 2021-10-13 RX ADMIN — TORSEMIDE 100 MG: 20 TABLET ORAL at 18:04

## 2021-10-13 RX ADMIN — Medication 1 CAPSULE: at 18:06

## 2021-10-13 RX ADMIN — NIFEDIPINE 30 MG: 30 TABLET, FILM COATED, EXTENDED RELEASE ORAL at 10:12

## 2021-10-13 RX ADMIN — SEVELAMER HYDROCHLORIDE 800 MG: 800 TABLET, FILM COATED PARENTERAL at 18:07

## 2021-10-13 RX ADMIN — MORPHINE SULFATE 30 MG: 15 TABLET ORAL at 18:21

## 2021-10-13 RX ADMIN — DICYCLOMINE HYDROCHLORIDE 10 MG: 10 CAPSULE ORAL at 05:19

## 2021-10-13 RX ADMIN — Medication 4 MG: at 10:46

## 2021-10-13 RX ADMIN — CEFAZOLIN SODIUM 1000 MG: 1 SOLUTION INTRAVENOUS at 18:11

## 2021-10-13 RX ADMIN — DICYCLOMINE HYDROCHLORIDE 10 MG: 10 CAPSULE ORAL at 18:07

## 2021-10-13 RX ADMIN — PREDNISOLONE ACETATE 1 DROP: 10 SUSPENSION/ DROPS OPHTHALMIC at 21:53

## 2021-10-13 RX ADMIN — INSULIN LISPRO 2 UNITS: 100 INJECTION, SOLUTION INTRAVENOUS; SUBCUTANEOUS at 18:11

## 2021-10-13 RX ADMIN — ATORVASTATIN CALCIUM 20 MG: 20 TABLET, FILM COATED ORAL at 18:06

## 2021-10-13 RX ADMIN — GABAPENTIN 300 MG: 300 CAPSULE ORAL at 10:11

## 2021-10-13 RX ADMIN — METHOCARBAMOL 500 MG: 500 TABLET, FILM COATED ORAL at 12:21

## 2021-10-13 RX ADMIN — PREDNISOLONE ACETATE 1 DROP: 10 SUSPENSION/ DROPS OPHTHALMIC at 18:10

## 2021-10-13 RX ADMIN — PREDNISOLONE ACETATE 1 DROP: 10 SUSPENSION/ DROPS OPHTHALMIC at 12:22

## 2021-10-13 RX ADMIN — LEVOTHYROXINE SODIUM 50 MCG: 50 TABLET ORAL at 05:18

## 2021-10-13 RX ADMIN — SERTRALINE 50 MG: 25 TABLET, FILM COATED ORAL at 10:11

## 2021-10-13 RX ADMIN — SEVELAMER HYDROCHLORIDE 800 MG: 800 TABLET, FILM COATED PARENTERAL at 10:13

## 2021-10-13 RX ADMIN — LORATADINE 10 MG: 10 TABLET ORAL at 10:12

## 2021-10-13 RX ADMIN — CALCIUM CARBONATE 625 MG: 1250 SUSPENSION ORAL at 18:08

## 2021-10-13 RX ADMIN — METHOCARBAMOL 500 MG: 500 TABLET, FILM COATED ORAL at 18:06

## 2021-10-13 RX ADMIN — DICLOFENAC SODIUM 2 G: 10 GEL TOPICAL at 18:22

## 2021-10-13 RX ADMIN — FAMOTIDINE 10 MG: 20 TABLET, FILM COATED ORAL at 10:44

## 2021-10-13 RX ADMIN — INSULIN GLARGINE 15 UNITS: 100 INJECTION, SOLUTION SUBCUTANEOUS at 21:50

## 2021-10-13 RX ADMIN — TORSEMIDE 100 MG: 20 TABLET ORAL at 05:18

## 2021-10-13 RX ADMIN — DICLOFENAC SODIUM 2 G: 10 GEL TOPICAL at 21:59

## 2021-10-13 RX ADMIN — CINACALCET 30 MG: 30 TABLET, FILM COATED ORAL at 10:13

## 2021-10-14 ENCOUNTER — APPOINTMENT (INPATIENT)
Dept: DIALYSIS | Facility: HOSPITAL | Age: 56
DRG: 622 | End: 2021-10-14
Payer: MEDICARE

## 2021-10-14 LAB
ANION GAP SERPL CALCULATED.3IONS-SCNC: 4 MMOL/L (ref 4–13)
APTT PPP: 81 SECONDS (ref 23–37)
APTT PPP: 81 SECONDS (ref 23–37)
BASOPHILS # BLD AUTO: 0.04 THOUSANDS/ΜL (ref 0–0.1)
BASOPHILS NFR BLD AUTO: 0 % (ref 0–1)
BUN SERPL-MCNC: 15 MG/DL (ref 5–25)
CALCIUM SERPL-MCNC: 9 MG/DL (ref 8.3–10.1)
CHLORIDE SERPL-SCNC: 98 MMOL/L (ref 100–108)
CO2 SERPL-SCNC: 32 MMOL/L (ref 21–32)
CREAT SERPL-MCNC: 4.21 MG/DL (ref 0.6–1.3)
EOSINOPHIL # BLD AUTO: 0.27 THOUSAND/ΜL (ref 0–0.61)
EOSINOPHIL NFR BLD AUTO: 3 % (ref 0–6)
ERYTHROCYTE [DISTWIDTH] IN BLOOD BY AUTOMATED COUNT: 15.4 % (ref 11.6–15.1)
GFR SERPL CREATININE-BSD FRML MDRD: 11 ML/MIN/1.73SQ M
GLUCOSE SERPL-MCNC: 102 MG/DL (ref 65–140)
GLUCOSE SERPL-MCNC: 111 MG/DL (ref 65–140)
GLUCOSE SERPL-MCNC: 119 MG/DL (ref 65–140)
GLUCOSE SERPL-MCNC: 143 MG/DL (ref 65–140)
GLUCOSE SERPL-MCNC: 200 MG/DL (ref 65–140)
GLUCOSE SERPL-MCNC: 47 MG/DL (ref 65–140)
GLUCOSE SERPL-MCNC: 65 MG/DL (ref 65–140)
HCT VFR BLD AUTO: 22.1 % (ref 34.8–46.1)
HGB BLD-MCNC: 7 G/DL (ref 11.5–15.4)
IMM GRANULOCYTES # BLD AUTO: 0.22 THOUSAND/UL (ref 0–0.2)
IMM GRANULOCYTES NFR BLD AUTO: 2 % (ref 0–2)
INR PPP: 1.53 (ref 0.84–1.19)
LYMPHOCYTES # BLD AUTO: 0.67 THOUSANDS/ΜL (ref 0.6–4.47)
LYMPHOCYTES NFR BLD AUTO: 7 % (ref 14–44)
MCH RBC QN AUTO: 31.3 PG (ref 26.8–34.3)
MCHC RBC AUTO-ENTMCNC: 31.7 G/DL (ref 31.4–37.4)
MCV RBC AUTO: 99 FL (ref 82–98)
MONOCYTES # BLD AUTO: 0.78 THOUSAND/ΜL (ref 0.17–1.22)
MONOCYTES NFR BLD AUTO: 8 % (ref 4–12)
NEUTROPHILS # BLD AUTO: 7.58 THOUSANDS/ΜL (ref 1.85–7.62)
NEUTS SEG NFR BLD AUTO: 80 % (ref 43–75)
NRBC BLD AUTO-RTO: 0 /100 WBCS
PLATELET # BLD AUTO: 192 THOUSANDS/UL (ref 149–390)
PMV BLD AUTO: 9.9 FL (ref 8.9–12.7)
POTASSIUM SERPL-SCNC: 4 MMOL/L (ref 3.5–5.3)
PROTHROMBIN TIME: 17.7 SECONDS (ref 11.6–14.5)
RBC # BLD AUTO: 2.24 MILLION/UL (ref 3.81–5.12)
SODIUM SERPL-SCNC: 134 MMOL/L (ref 136–145)
WBC # BLD AUTO: 9.56 THOUSAND/UL (ref 4.31–10.16)

## 2021-10-14 PROCEDURE — 85025 COMPLETE CBC W/AUTO DIFF WBC: CPT

## 2021-10-14 PROCEDURE — 99024 POSTOP FOLLOW-UP VISIT: CPT | Performed by: PODIATRIST

## 2021-10-14 PROCEDURE — 85610 PROTHROMBIN TIME: CPT | Performed by: HOSPITALIST

## 2021-10-14 PROCEDURE — 99233 SBSQ HOSP IP/OBS HIGH 50: CPT | Performed by: INTERNAL MEDICINE

## 2021-10-14 PROCEDURE — 82948 REAGENT STRIP/BLOOD GLUCOSE: CPT

## 2021-10-14 PROCEDURE — 80048 BASIC METABOLIC PNL TOTAL CA: CPT

## 2021-10-14 PROCEDURE — 99232 SBSQ HOSP IP/OBS MODERATE 35: CPT | Performed by: HOSPITALIST

## 2021-10-14 PROCEDURE — 85730 THROMBOPLASTIN TIME PARTIAL: CPT | Performed by: PODIATRIST

## 2021-10-14 PROCEDURE — 90935 HEMODIALYSIS ONE EVALUATION: CPT | Performed by: INTERNAL MEDICINE

## 2021-10-14 RX ORDER — INSULIN GLARGINE 100 [IU]/ML
5 INJECTION, SOLUTION SUBCUTANEOUS
Status: DISCONTINUED | OUTPATIENT
Start: 2021-10-14 | End: 2021-10-18 | Stop reason: HOSPADM

## 2021-10-14 RX ADMIN — TORSEMIDE 100 MG: 20 TABLET ORAL at 06:02

## 2021-10-14 RX ADMIN — PREDNISOLONE ACETATE 1 DROP: 10 SUSPENSION/ DROPS OPHTHALMIC at 22:06

## 2021-10-14 RX ADMIN — MORPHINE SULFATE 30 MG: 15 TABLET ORAL at 18:29

## 2021-10-14 RX ADMIN — WARFARIN SODIUM 12 MG: 5 TABLET ORAL at 18:04

## 2021-10-14 RX ADMIN — DOCUSATE SODIUM 100 MG: 100 CAPSULE, LIQUID FILLED ORAL at 13:18

## 2021-10-14 RX ADMIN — NIFEDIPINE 30 MG: 30 TABLET, FILM COATED, EXTENDED RELEASE ORAL at 13:18

## 2021-10-14 RX ADMIN — DOCUSATE SODIUM 100 MG: 100 CAPSULE, LIQUID FILLED ORAL at 18:04

## 2021-10-14 RX ADMIN — SEVELAMER HYDROCHLORIDE 800 MG: 800 TABLET, FILM COATED PARENTERAL at 13:21

## 2021-10-14 RX ADMIN — SERTRALINE 50 MG: 25 TABLET, FILM COATED ORAL at 13:17

## 2021-10-14 RX ADMIN — INSULIN LISPRO 4 UNITS: 100 INJECTION, SOLUTION INTRAVENOUS; SUBCUTANEOUS at 18:06

## 2021-10-14 RX ADMIN — DICLOFENAC SODIUM 2 G: 10 GEL TOPICAL at 22:07

## 2021-10-14 RX ADMIN — LORATADINE 10 MG: 10 TABLET ORAL at 13:26

## 2021-10-14 RX ADMIN — Medication 1 CAPSULE: at 18:04

## 2021-10-14 RX ADMIN — DICYCLOMINE HYDROCHLORIDE 10 MG: 10 CAPSULE ORAL at 13:21

## 2021-10-14 RX ADMIN — VANCOMYCIN HYDROCHLORIDE 1250 MG: 10 INJECTION, POWDER, LYOPHILIZED, FOR SOLUTION INTRAVENOUS at 10:36

## 2021-10-14 RX ADMIN — ALPRAZOLAM 0.25 MG: 0.25 TABLET ORAL at 08:24

## 2021-10-14 RX ADMIN — DICYCLOMINE HYDROCHLORIDE 10 MG: 10 CAPSULE ORAL at 22:07

## 2021-10-14 RX ADMIN — CEFAZOLIN SODIUM 2000 MG: 2 SOLUTION INTRAVENOUS at 11:50

## 2021-10-14 RX ADMIN — LIDOCAINE 1 PATCH: 50 PATCH TOPICAL at 13:17

## 2021-10-14 RX ADMIN — DICYCLOMINE HYDROCHLORIDE 10 MG: 10 CAPSULE ORAL at 18:07

## 2021-10-14 RX ADMIN — SEVELAMER HYDROCHLORIDE 800 MG: 800 TABLET, FILM COATED PARENTERAL at 18:05

## 2021-10-14 RX ADMIN — INSULIN GLARGINE 5 UNITS: 100 INJECTION, SOLUTION SUBCUTANEOUS at 22:10

## 2021-10-14 RX ADMIN — PANTOPRAZOLE SODIUM 40 MG: 40 TABLET, DELAYED RELEASE ORAL at 06:02

## 2021-10-14 RX ADMIN — METHOCARBAMOL 500 MG: 500 TABLET, FILM COATED ORAL at 18:04

## 2021-10-14 RX ADMIN — CINACALCET 30 MG: 30 TABLET, FILM COATED ORAL at 13:21

## 2021-10-14 RX ADMIN — HEPARIN SODIUM 11 UNITS/KG/HR: 10000 INJECTION, SOLUTION INTRAVENOUS at 06:48

## 2021-10-14 RX ADMIN — DOXERCALCIFEROL 2 MCG: 4 INJECTION, SOLUTION INTRAVENOUS at 08:51

## 2021-10-14 RX ADMIN — CALCIUM CARBONATE 625 MG: 1250 SUSPENSION ORAL at 18:05

## 2021-10-14 RX ADMIN — METHOCARBAMOL 500 MG: 500 TABLET, FILM COATED ORAL at 06:02

## 2021-10-14 RX ADMIN — HYDROMORPHONE HYDROCHLORIDE 0.5 MG: 1 INJECTION, SOLUTION INTRAMUSCULAR; INTRAVENOUS; SUBCUTANEOUS at 08:28

## 2021-10-14 RX ADMIN — TORSEMIDE 100 MG: 20 TABLET ORAL at 18:04

## 2021-10-14 RX ADMIN — ATROPINE SULFATE 1 DROP: 10 SOLUTION/ DROPS OPHTHALMIC at 22:07

## 2021-10-14 RX ADMIN — PREDNISOLONE ACETATE 1 DROP: 10 SUSPENSION/ DROPS OPHTHALMIC at 18:07

## 2021-10-14 RX ADMIN — LEVOTHYROXINE SODIUM 50 MCG: 50 TABLET ORAL at 06:02

## 2021-10-14 RX ADMIN — DICYCLOMINE HYDROCHLORIDE 10 MG: 10 CAPSULE ORAL at 06:03

## 2021-10-14 RX ADMIN — PREDNISOLONE ACETATE 1 DROP: 10 SUSPENSION/ DROPS OPHTHALMIC at 13:19

## 2021-10-14 RX ADMIN — ATORVASTATIN CALCIUM 20 MG: 20 TABLET, FILM COATED ORAL at 18:04

## 2021-10-14 RX ADMIN — METHOCARBAMOL 500 MG: 500 TABLET, FILM COATED ORAL at 13:18

## 2021-10-14 RX ADMIN — GABAPENTIN 300 MG: 300 CAPSULE ORAL at 13:20

## 2021-10-15 LAB
ANION GAP SERPL CALCULATED.3IONS-SCNC: 5 MMOL/L (ref 4–13)
APTT PPP: 111 SECONDS (ref 23–37)
APTT PPP: 98 SECONDS (ref 23–37)
BUN SERPL-MCNC: 21 MG/DL (ref 5–25)
CALCIUM SERPL-MCNC: 9.4 MG/DL (ref 8.3–10.1)
CHLORIDE SERPL-SCNC: 97 MMOL/L (ref 100–108)
CO2 SERPL-SCNC: 30 MMOL/L (ref 21–32)
CREAT SERPL-MCNC: 5.52 MG/DL (ref 0.6–1.3)
ERYTHROCYTE [DISTWIDTH] IN BLOOD BY AUTOMATED COUNT: 15.2 % (ref 11.6–15.1)
GFR SERPL CREATININE-BSD FRML MDRD: 8 ML/MIN/1.73SQ M
GLUCOSE SERPL-MCNC: 118 MG/DL (ref 65–140)
GLUCOSE SERPL-MCNC: 163 MG/DL (ref 65–140)
GLUCOSE SERPL-MCNC: 163 MG/DL (ref 65–140)
GLUCOSE SERPL-MCNC: 184 MG/DL (ref 65–140)
GLUCOSE SERPL-MCNC: 208 MG/DL (ref 65–140)
GLUCOSE SERPL-MCNC: 218 MG/DL (ref 65–140)
HCT VFR BLD AUTO: 24.8 % (ref 34.8–46.1)
HGB BLD-MCNC: 7.7 G/DL (ref 11.5–15.4)
INR PPP: 1.74 (ref 0.84–1.19)
MCH RBC QN AUTO: 31 PG (ref 26.8–34.3)
MCHC RBC AUTO-ENTMCNC: 31 G/DL (ref 31.4–37.4)
MCV RBC AUTO: 100 FL (ref 82–98)
PLATELET # BLD AUTO: 184 THOUSANDS/UL (ref 149–390)
PMV BLD AUTO: 9.7 FL (ref 8.9–12.7)
POTASSIUM SERPL-SCNC: 4.7 MMOL/L (ref 3.5–5.3)
PROTHROMBIN TIME: 19.5 SECONDS (ref 11.6–14.5)
RBC # BLD AUTO: 2.48 MILLION/UL (ref 3.81–5.12)
SODIUM SERPL-SCNC: 132 MMOL/L (ref 136–145)
WBC # BLD AUTO: 10.85 THOUSAND/UL (ref 4.31–10.16)

## 2021-10-15 PROCEDURE — 97605 NEG PRS WND THER DME<=50SQCM: CPT | Performed by: PODIATRIST

## 2021-10-15 PROCEDURE — 85730 THROMBOPLASTIN TIME PARTIAL: CPT | Performed by: PODIATRIST

## 2021-10-15 PROCEDURE — 85027 COMPLETE CBC AUTOMATED: CPT

## 2021-10-15 PROCEDURE — 82948 REAGENT STRIP/BLOOD GLUCOSE: CPT

## 2021-10-15 PROCEDURE — 80048 BASIC METABOLIC PNL TOTAL CA: CPT

## 2021-10-15 PROCEDURE — 97163 PT EVAL HIGH COMPLEX 45 MIN: CPT

## 2021-10-15 PROCEDURE — 85610 PROTHROMBIN TIME: CPT | Performed by: HOSPITALIST

## 2021-10-15 PROCEDURE — 97166 OT EVAL MOD COMPLEX 45 MIN: CPT

## 2021-10-15 PROCEDURE — 99232 SBSQ HOSP IP/OBS MODERATE 35: CPT | Performed by: HOSPITALIST

## 2021-10-15 PROCEDURE — 99232 SBSQ HOSP IP/OBS MODERATE 35: CPT | Performed by: INTERNAL MEDICINE

## 2021-10-15 RX ORDER — WARFARIN SODIUM 5 MG/1
15 TABLET ORAL
Status: DISCONTINUED | OUTPATIENT
Start: 2021-10-15 | End: 2021-10-17

## 2021-10-15 RX ADMIN — WARFARIN SODIUM 15 MG: 5 TABLET ORAL at 17:22

## 2021-10-15 RX ADMIN — DICYCLOMINE HYDROCHLORIDE 10 MG: 10 CAPSULE ORAL at 06:25

## 2021-10-15 RX ADMIN — CALCIUM CARBONATE 625 MG: 1250 SUSPENSION ORAL at 08:48

## 2021-10-15 RX ADMIN — TORSEMIDE 100 MG: 20 TABLET ORAL at 17:23

## 2021-10-15 RX ADMIN — ATORVASTATIN CALCIUM 20 MG: 20 TABLET, FILM COATED ORAL at 17:22

## 2021-10-15 RX ADMIN — PREDNISOLONE ACETATE 1 DROP: 10 SUSPENSION/ DROPS OPHTHALMIC at 08:49

## 2021-10-15 RX ADMIN — METHOCARBAMOL 500 MG: 500 TABLET, FILM COATED ORAL at 12:38

## 2021-10-15 RX ADMIN — LEVOTHYROXINE SODIUM 50 MCG: 50 TABLET ORAL at 05:28

## 2021-10-15 RX ADMIN — PREDNISOLONE ACETATE 1 DROP: 10 SUSPENSION/ DROPS OPHTHALMIC at 21:08

## 2021-10-15 RX ADMIN — SEVELAMER HYDROCHLORIDE 800 MG: 800 TABLET, FILM COATED PARENTERAL at 08:49

## 2021-10-15 RX ADMIN — METHOCARBAMOL 500 MG: 500 TABLET, FILM COATED ORAL at 05:32

## 2021-10-15 RX ADMIN — DICLOFENAC SODIUM 2 G: 10 GEL TOPICAL at 21:08

## 2021-10-15 RX ADMIN — CINACALCET 30 MG: 30 TABLET, FILM COATED ORAL at 08:49

## 2021-10-15 RX ADMIN — Medication 1 CAPSULE: at 16:18

## 2021-10-15 RX ADMIN — METHOCARBAMOL 500 MG: 500 TABLET, FILM COATED ORAL at 00:16

## 2021-10-15 RX ADMIN — DICYCLOMINE HYDROCHLORIDE 10 MG: 10 CAPSULE ORAL at 16:21

## 2021-10-15 RX ADMIN — PREDNISOLONE ACETATE 1 DROP: 10 SUSPENSION/ DROPS OPHTHALMIC at 17:26

## 2021-10-15 RX ADMIN — HYDROMORPHONE HYDROCHLORIDE 0.5 MG: 1 INJECTION, SOLUTION INTRAMUSCULAR; INTRAVENOUS; SUBCUTANEOUS at 16:25

## 2021-10-15 RX ADMIN — DICYCLOMINE HYDROCHLORIDE 10 MG: 10 CAPSULE ORAL at 21:08

## 2021-10-15 RX ADMIN — DOCUSATE SODIUM 100 MG: 100 CAPSULE, LIQUID FILLED ORAL at 17:22

## 2021-10-15 RX ADMIN — ALPRAZOLAM 0.25 MG: 0.25 TABLET ORAL at 12:38

## 2021-10-15 RX ADMIN — LORATADINE 10 MG: 10 TABLET ORAL at 08:40

## 2021-10-15 RX ADMIN — SEVELAMER HYDROCHLORIDE 800 MG: 800 TABLET, FILM COATED PARENTERAL at 12:38

## 2021-10-15 RX ADMIN — METHOCARBAMOL 500 MG: 500 TABLET, FILM COATED ORAL at 23:57

## 2021-10-15 RX ADMIN — TORSEMIDE 100 MG: 20 TABLET ORAL at 05:29

## 2021-10-15 RX ADMIN — PANTOPRAZOLE SODIUM 40 MG: 40 TABLET, DELAYED RELEASE ORAL at 05:28

## 2021-10-15 RX ADMIN — CALCIUM CARBONATE 625 MG: 1250 SUSPENSION ORAL at 16:21

## 2021-10-15 RX ADMIN — SERTRALINE 50 MG: 25 TABLET, FILM COATED ORAL at 08:40

## 2021-10-15 RX ADMIN — INSULIN LISPRO 4 UNITS: 100 INJECTION, SOLUTION INTRAVENOUS; SUBCUTANEOUS at 12:39

## 2021-10-15 RX ADMIN — HEPARIN SODIUM 11 UNITS/KG/HR: 10000 INJECTION, SOLUTION INTRAVENOUS at 01:29

## 2021-10-15 RX ADMIN — SEVELAMER HYDROCHLORIDE 800 MG: 800 TABLET, FILM COATED PARENTERAL at 16:21

## 2021-10-15 RX ADMIN — ATROPINE SULFATE 1 DROP: 10 SOLUTION/ DROPS OPHTHALMIC at 21:07

## 2021-10-15 RX ADMIN — INSULIN GLARGINE 5 UNITS: 100 INJECTION, SOLUTION SUBCUTANEOUS at 22:07

## 2021-10-15 RX ADMIN — LIDOCAINE 1 PATCH: 50 PATCH TOPICAL at 08:41

## 2021-10-15 RX ADMIN — DICYCLOMINE HYDROCHLORIDE 10 MG: 10 CAPSULE ORAL at 12:38

## 2021-10-15 RX ADMIN — MORPHINE SULFATE 30 MG: 15 TABLET ORAL at 08:57

## 2021-10-15 RX ADMIN — GABAPENTIN 300 MG: 300 CAPSULE ORAL at 08:40

## 2021-10-15 RX ADMIN — PREDNISOLONE ACETATE 1 DROP: 10 SUSPENSION/ DROPS OPHTHALMIC at 12:38

## 2021-10-15 RX ADMIN — NIFEDIPINE 30 MG: 30 TABLET, FILM COATED, EXTENDED RELEASE ORAL at 08:40

## 2021-10-15 RX ADMIN — DOCUSATE SODIUM 100 MG: 100 CAPSULE, LIQUID FILLED ORAL at 08:40

## 2021-10-15 RX ADMIN — METHOCARBAMOL 500 MG: 500 TABLET, FILM COATED ORAL at 17:22

## 2021-10-16 ENCOUNTER — APPOINTMENT (INPATIENT)
Dept: DIALYSIS | Facility: HOSPITAL | Age: 56
DRG: 622 | End: 2021-10-16
Payer: MEDICARE

## 2021-10-16 LAB
APTT PPP: 101 SECONDS (ref 23–37)
APTT PPP: 61 SECONDS (ref 23–37)
APTT PPP: 65 SECONDS (ref 23–37)
GLUCOSE SERPL-MCNC: 137 MG/DL (ref 65–140)
GLUCOSE SERPL-MCNC: 175 MG/DL (ref 65–140)
GLUCOSE SERPL-MCNC: 194 MG/DL (ref 65–140)
GLUCOSE SERPL-MCNC: 218 MG/DL (ref 65–140)
GLUCOSE SERPL-MCNC: 43 MG/DL (ref 65–140)
INR PPP: 2.3 (ref 0.84–1.19)
PROTHROMBIN TIME: 24.2 SECONDS (ref 11.6–14.5)
VANCOMYCIN SERPL-MCNC: 25.9 UG/ML

## 2021-10-16 PROCEDURE — 85730 THROMBOPLASTIN TIME PARTIAL: CPT | Performed by: FAMILY MEDICINE

## 2021-10-16 PROCEDURE — 82948 REAGENT STRIP/BLOOD GLUCOSE: CPT

## 2021-10-16 PROCEDURE — 90935 HEMODIALYSIS ONE EVALUATION: CPT | Performed by: INTERNAL MEDICINE

## 2021-10-16 PROCEDURE — 85730 THROMBOPLASTIN TIME PARTIAL: CPT | Performed by: PODIATRIST

## 2021-10-16 PROCEDURE — 85732 THROMBOPLASTIN TIME PARTIAL: CPT | Performed by: PODIATRIST

## 2021-10-16 PROCEDURE — 85610 PROTHROMBIN TIME: CPT | Performed by: HOSPITALIST

## 2021-10-16 PROCEDURE — 80202 ASSAY OF VANCOMYCIN: CPT | Performed by: PODIATRIST

## 2021-10-16 PROCEDURE — 99232 SBSQ HOSP IP/OBS MODERATE 35: CPT | Performed by: NURSE PRACTITIONER

## 2021-10-16 PROCEDURE — 99024 POSTOP FOLLOW-UP VISIT: CPT | Performed by: PODIATRIST

## 2021-10-16 RX ORDER — VANCOMYCIN HYDROCHLORIDE 1 G/200ML
10 INJECTION, SOLUTION INTRAVENOUS
Status: DISCONTINUED | OUTPATIENT
Start: 2021-10-16 | End: 2021-10-16

## 2021-10-16 RX ORDER — VANCOMYCIN HYDROCHLORIDE 1 G/200ML
10 INJECTION, SOLUTION INTRAVENOUS
Status: DISCONTINUED | OUTPATIENT
Start: 2021-10-18 | End: 2021-10-18 | Stop reason: HOSPADM

## 2021-10-16 RX ADMIN — TORSEMIDE 100 MG: 20 TABLET ORAL at 17:38

## 2021-10-16 RX ADMIN — DICLOFENAC SODIUM 2 G: 10 GEL TOPICAL at 21:48

## 2021-10-16 RX ADMIN — ATROPINE SULFATE 1 DROP: 10 SOLUTION/ DROPS OPHTHALMIC at 21:49

## 2021-10-16 RX ADMIN — LEVOTHYROXINE SODIUM 50 MCG: 50 TABLET ORAL at 05:46

## 2021-10-16 RX ADMIN — VANCOMYCIN HYDROCHLORIDE 1250 MG: 10 INJECTION, POWDER, LYOPHILIZED, FOR SOLUTION INTRAVENOUS at 10:23

## 2021-10-16 RX ADMIN — CALCIUM CARBONATE 625 MG: 1250 SUSPENSION ORAL at 13:08

## 2021-10-16 RX ADMIN — WARFARIN SODIUM 15 MG: 5 TABLET ORAL at 17:38

## 2021-10-16 RX ADMIN — METHOCARBAMOL 500 MG: 500 TABLET, FILM COATED ORAL at 13:07

## 2021-10-16 RX ADMIN — DICLOFENAC SODIUM 2 G: 10 GEL TOPICAL at 17:41

## 2021-10-16 RX ADMIN — DICYCLOMINE HYDROCHLORIDE 10 MG: 10 CAPSULE ORAL at 06:03

## 2021-10-16 RX ADMIN — ALPRAZOLAM 0.25 MG: 0.25 TABLET ORAL at 13:57

## 2021-10-16 RX ADMIN — DOXERCALCIFEROL 2 MCG: 4 INJECTION, SOLUTION INTRAVENOUS at 10:45

## 2021-10-16 RX ADMIN — Medication 4 G: at 16:43

## 2021-10-16 RX ADMIN — HEPARIN SODIUM 5 UNITS/KG/HR: 10000 INJECTION, SOLUTION INTRAVENOUS at 01:04

## 2021-10-16 RX ADMIN — GABAPENTIN 300 MG: 300 CAPSULE ORAL at 13:07

## 2021-10-16 RX ADMIN — CINACALCET 30 MG: 30 TABLET, FILM COATED ORAL at 13:07

## 2021-10-16 RX ADMIN — ATORVASTATIN CALCIUM 20 MG: 20 TABLET, FILM COATED ORAL at 17:39

## 2021-10-16 RX ADMIN — LORATADINE 10 MG: 10 TABLET ORAL at 13:07

## 2021-10-16 RX ADMIN — PREDNISOLONE ACETATE 1 DROP: 10 SUSPENSION/ DROPS OPHTHALMIC at 17:41

## 2021-10-16 RX ADMIN — DOCUSATE SODIUM 100 MG: 100 CAPSULE, LIQUID FILLED ORAL at 17:38

## 2021-10-16 RX ADMIN — PREDNISOLONE ACETATE 1 DROP: 10 SUSPENSION/ DROPS OPHTHALMIC at 21:50

## 2021-10-16 RX ADMIN — METHOCARBAMOL 500 MG: 500 TABLET, FILM COATED ORAL at 05:46

## 2021-10-16 RX ADMIN — DICYCLOMINE HYDROCHLORIDE 10 MG: 10 CAPSULE ORAL at 13:07

## 2021-10-16 RX ADMIN — DICYCLOMINE HYDROCHLORIDE 10 MG: 10 CAPSULE ORAL at 17:39

## 2021-10-16 RX ADMIN — SEVELAMER HYDROCHLORIDE 800 MG: 800 TABLET, FILM COATED PARENTERAL at 13:08

## 2021-10-16 RX ADMIN — INSULIN LISPRO 4 UNITS: 100 INJECTION, SOLUTION INTRAVENOUS; SUBCUTANEOUS at 13:10

## 2021-10-16 RX ADMIN — CEFAZOLIN SODIUM 3000 MG: 1 INJECTION, POWDER, FOR SOLUTION INTRAMUSCULAR; INTRAVENOUS at 11:37

## 2021-10-16 RX ADMIN — METHOCARBAMOL 500 MG: 500 TABLET, FILM COATED ORAL at 23:15

## 2021-10-16 RX ADMIN — Medication 1 CAPSULE: at 17:38

## 2021-10-16 RX ADMIN — PANTOPRAZOLE SODIUM 40 MG: 40 TABLET, DELAYED RELEASE ORAL at 05:46

## 2021-10-16 RX ADMIN — OXYCODONE HYDROCHLORIDE AND ACETAMINOPHEN 1 TABLET: 5; 325 TABLET ORAL at 07:33

## 2021-10-16 RX ADMIN — METHOCARBAMOL 500 MG: 500 TABLET, FILM COATED ORAL at 17:39

## 2021-10-16 RX ADMIN — OXYCODONE HYDROCHLORIDE AND ACETAMINOPHEN 1 TABLET: 5; 325 TABLET ORAL at 13:02

## 2021-10-16 RX ADMIN — DICYCLOMINE HYDROCHLORIDE 10 MG: 10 CAPSULE ORAL at 21:49

## 2021-10-16 RX ADMIN — ALPRAZOLAM 0.25 MG: 0.25 TABLET ORAL at 07:33

## 2021-10-16 RX ADMIN — SEVELAMER HYDROCHLORIDE 800 MG: 800 TABLET, FILM COATED PARENTERAL at 17:38

## 2021-10-16 RX ADMIN — PREDNISOLONE ACETATE 1 DROP: 10 SUSPENSION/ DROPS OPHTHALMIC at 13:10

## 2021-10-16 RX ADMIN — CALCIUM CARBONATE 625 MG: 1250 SUSPENSION ORAL at 17:39

## 2021-10-16 RX ADMIN — INSULIN GLARGINE 5 UNITS: 100 INJECTION, SOLUTION SUBCUTANEOUS at 21:48

## 2021-10-16 RX ADMIN — SERTRALINE 50 MG: 25 TABLET, FILM COATED ORAL at 13:06

## 2021-10-17 LAB
ANISOCYTOSIS BLD QL SMEAR: PRESENT
ARTIFACT: PRESENT
BASOPHILS # BLD MANUAL: 0.1 THOUSAND/UL (ref 0–0.1)
BASOPHILS NFR MAR MANUAL: 1 % (ref 0–1)
DACRYOCYTES BLD QL SMEAR: PRESENT
EOSINOPHIL # BLD MANUAL: 0.39 THOUSAND/UL (ref 0–0.4)
EOSINOPHIL NFR BLD MANUAL: 4 % (ref 0–6)
ERYTHROCYTE [DISTWIDTH] IN BLOOD BY AUTOMATED COUNT: 15.6 % (ref 11.6–15.1)
GLUCOSE SERPL-MCNC: 107 MG/DL (ref 65–140)
GLUCOSE SERPL-MCNC: 123 MG/DL (ref 65–140)
GLUCOSE SERPL-MCNC: 139 MG/DL (ref 65–140)
GLUCOSE SERPL-MCNC: 168 MG/DL (ref 65–140)
HCT VFR BLD AUTO: 23.8 % (ref 34.8–46.1)
HGB BLD-MCNC: 7.5 G/DL (ref 11.5–15.4)
INR PPP: 2.49 (ref 0.84–1.19)
LYMPHOCYTES # BLD AUTO: 0.87 THOUSAND/UL (ref 0.6–4.47)
LYMPHOCYTES # BLD AUTO: 9 % (ref 14–44)
MCH RBC QN AUTO: 31.8 PG (ref 26.8–34.3)
MCHC RBC AUTO-ENTMCNC: 31.5 G/DL (ref 31.4–37.4)
MCV RBC AUTO: 101 FL (ref 82–98)
METAMYELOCYTES NFR BLD MANUAL: 1 % (ref 0–1)
MONOCYTES # BLD AUTO: 0.58 THOUSAND/UL (ref 0–1.22)
MONOCYTES NFR BLD: 6 % (ref 4–12)
NEUTROPHILS # BLD MANUAL: 7.66 THOUSAND/UL (ref 1.85–7.62)
NEUTS BAND NFR BLD MANUAL: 1 % (ref 0–8)
NEUTS SEG NFR BLD AUTO: 78 % (ref 43–75)
PLATELET # BLD AUTO: 179 THOUSANDS/UL (ref 149–390)
PLATELET BLD QL SMEAR: ADEQUATE
PMV BLD AUTO: 9.9 FL (ref 8.9–12.7)
POIKILOCYTOSIS BLD QL SMEAR: PRESENT
POLYCHROMASIA BLD QL SMEAR: PRESENT
PROTHROMBIN TIME: 25.7 SECONDS (ref 11.6–14.5)
RBC # BLD AUTO: 2.36 MILLION/UL (ref 3.81–5.12)
RBC MORPH BLD: PRESENT
WBC # BLD AUTO: 9.69 THOUSAND/UL (ref 4.31–10.16)

## 2021-10-17 PROCEDURE — 99024 POSTOP FOLLOW-UP VISIT: CPT | Performed by: PODIATRIST

## 2021-10-17 PROCEDURE — 85007 BL SMEAR W/DIFF WBC COUNT: CPT | Performed by: STUDENT IN AN ORGANIZED HEALTH CARE EDUCATION/TRAINING PROGRAM

## 2021-10-17 PROCEDURE — 99232 SBSQ HOSP IP/OBS MODERATE 35: CPT | Performed by: INTERNAL MEDICINE

## 2021-10-17 PROCEDURE — 85610 PROTHROMBIN TIME: CPT | Performed by: NURSE PRACTITIONER

## 2021-10-17 PROCEDURE — 85027 COMPLETE CBC AUTOMATED: CPT | Performed by: STUDENT IN AN ORGANIZED HEALTH CARE EDUCATION/TRAINING PROGRAM

## 2021-10-17 PROCEDURE — 99232 SBSQ HOSP IP/OBS MODERATE 35: CPT | Performed by: NURSE PRACTITIONER

## 2021-10-17 PROCEDURE — 82948 REAGENT STRIP/BLOOD GLUCOSE: CPT

## 2021-10-17 RX ORDER — WARFARIN SODIUM 3 MG/1
9 TABLET ORAL
Status: DISCONTINUED | OUTPATIENT
Start: 2021-10-17 | End: 2021-10-18 | Stop reason: HOSPADM

## 2021-10-17 RX ORDER — OXYCODONE HYDROCHLORIDE AND ACETAMINOPHEN 5; 325 MG/1; MG/1
TABLET ORAL
Status: DISPENSED
Start: 2021-10-17 | End: 2021-10-18

## 2021-10-17 RX ORDER — OXYCODONE HYDROCHLORIDE 5 MG/1
TABLET ORAL
Status: DISCONTINUED
Start: 2021-10-17 | End: 2021-10-17 | Stop reason: WASHOUT

## 2021-10-17 RX ADMIN — DICYCLOMINE HYDROCHLORIDE 10 MG: 10 CAPSULE ORAL at 17:39

## 2021-10-17 RX ADMIN — SEVELAMER HYDROCHLORIDE 800 MG: 800 TABLET, FILM COATED PARENTERAL at 17:40

## 2021-10-17 RX ADMIN — PREDNISOLONE ACETATE 1 DROP: 10 SUSPENSION/ DROPS OPHTHALMIC at 17:41

## 2021-10-17 RX ADMIN — DICLOFENAC SODIUM 2 G: 10 GEL TOPICAL at 17:41

## 2021-10-17 RX ADMIN — DICYCLOMINE HYDROCHLORIDE 10 MG: 10 CAPSULE ORAL at 12:19

## 2021-10-17 RX ADMIN — DICLOFENAC SODIUM 2 G: 10 GEL TOPICAL at 09:08

## 2021-10-17 RX ADMIN — TORSEMIDE 100 MG: 20 TABLET ORAL at 05:04

## 2021-10-17 RX ADMIN — DOCUSATE SODIUM 100 MG: 100 CAPSULE, LIQUID FILLED ORAL at 09:06

## 2021-10-17 RX ADMIN — LEVOTHYROXINE SODIUM 50 MCG: 50 TABLET ORAL at 05:03

## 2021-10-17 RX ADMIN — NYSTATIN: 100000 POWDER TOPICAL at 09:09

## 2021-10-17 RX ADMIN — Medication 1 CAPSULE: at 17:39

## 2021-10-17 RX ADMIN — ATORVASTATIN CALCIUM 20 MG: 20 TABLET, FILM COATED ORAL at 17:39

## 2021-10-17 RX ADMIN — DICYCLOMINE HYDROCHLORIDE 10 MG: 10 CAPSULE ORAL at 21:55

## 2021-10-17 RX ADMIN — METHOCARBAMOL 500 MG: 500 TABLET, FILM COATED ORAL at 17:39

## 2021-10-17 RX ADMIN — PANTOPRAZOLE SODIUM 40 MG: 40 TABLET, DELAYED RELEASE ORAL at 05:03

## 2021-10-17 RX ADMIN — SEVELAMER HYDROCHLORIDE 800 MG: 800 TABLET, FILM COATED PARENTERAL at 09:07

## 2021-10-17 RX ADMIN — DICYCLOMINE HYDROCHLORIDE 10 MG: 10 CAPSULE ORAL at 06:11

## 2021-10-17 RX ADMIN — PREDNISOLONE ACETATE 1 DROP: 10 SUSPENSION/ DROPS OPHTHALMIC at 12:18

## 2021-10-17 RX ADMIN — METHOCARBAMOL 500 MG: 500 TABLET, FILM COATED ORAL at 12:18

## 2021-10-17 RX ADMIN — DOCUSATE SODIUM 100 MG: 100 CAPSULE, LIQUID FILLED ORAL at 17:39

## 2021-10-17 RX ADMIN — CALCIUM CARBONATE 625 MG: 1250 SUSPENSION ORAL at 17:39

## 2021-10-17 RX ADMIN — ATROPINE SULFATE 1 DROP: 10 SOLUTION/ DROPS OPHTHALMIC at 21:55

## 2021-10-17 RX ADMIN — CALCIUM CARBONATE 625 MG: 1250 SUSPENSION ORAL at 09:07

## 2021-10-17 RX ADMIN — METHOCARBAMOL 500 MG: 500 TABLET, FILM COATED ORAL at 05:03

## 2021-10-17 RX ADMIN — DICLOFENAC SODIUM 2 G: 10 GEL TOPICAL at 12:19

## 2021-10-17 RX ADMIN — DICLOFENAC SODIUM 2 G: 10 GEL TOPICAL at 21:55

## 2021-10-17 RX ADMIN — ALPRAZOLAM 0.25 MG: 0.25 TABLET ORAL at 09:06

## 2021-10-17 RX ADMIN — TORSEMIDE 100 MG: 20 TABLET ORAL at 17:39

## 2021-10-17 RX ADMIN — NIFEDIPINE 30 MG: 30 TABLET, FILM COATED, EXTENDED RELEASE ORAL at 09:06

## 2021-10-17 RX ADMIN — INSULIN LISPRO 2 UNITS: 100 INJECTION, SOLUTION INTRAVENOUS; SUBCUTANEOUS at 17:40

## 2021-10-17 RX ADMIN — CINACALCET 30 MG: 30 TABLET, FILM COATED ORAL at 09:07

## 2021-10-17 RX ADMIN — INSULIN GLARGINE 5 UNITS: 100 INJECTION, SOLUTION SUBCUTANEOUS at 21:55

## 2021-10-17 RX ADMIN — WARFARIN SODIUM 9 MG: 3 TABLET ORAL at 18:37

## 2021-10-17 RX ADMIN — LORATADINE 10 MG: 10 TABLET ORAL at 09:06

## 2021-10-17 RX ADMIN — OXYCODONE HYDROCHLORIDE AND ACETAMINOPHEN 1 TABLET: 5; 325 TABLET ORAL at 20:45

## 2021-10-17 RX ADMIN — SEVELAMER HYDROCHLORIDE 800 MG: 800 TABLET, FILM COATED PARENTERAL at 12:18

## 2021-10-17 RX ADMIN — LIDOCAINE 1 PATCH: 50 PATCH TOPICAL at 09:04

## 2021-10-17 RX ADMIN — PREDNISOLONE ACETATE 1 DROP: 10 SUSPENSION/ DROPS OPHTHALMIC at 09:06

## 2021-10-17 RX ADMIN — NYSTATIN: 100000 POWDER TOPICAL at 17:41

## 2021-10-17 RX ADMIN — OXYCODONE HYDROCHLORIDE AND ACETAMINOPHEN 1 TABLET: 5; 325 TABLET ORAL at 15:11

## 2021-10-17 RX ADMIN — GABAPENTIN 300 MG: 300 CAPSULE ORAL at 09:06

## 2021-10-17 RX ADMIN — PREDNISOLONE ACETATE 1 DROP: 10 SUSPENSION/ DROPS OPHTHALMIC at 22:16

## 2021-10-17 RX ADMIN — SERTRALINE 50 MG: 25 TABLET, FILM COATED ORAL at 09:06

## 2021-10-18 VITALS
BODY MASS INDEX: 45.99 KG/M2 | OXYGEN SATURATION: 99 % | SYSTOLIC BLOOD PRESSURE: 141 MMHG | WEIGHT: 293 LBS | HEIGHT: 67 IN | RESPIRATION RATE: 16 BRPM | TEMPERATURE: 97.9 F | DIASTOLIC BLOOD PRESSURE: 75 MMHG | HEART RATE: 72 BPM

## 2021-10-18 LAB
GLUCOSE SERPL-MCNC: 141 MG/DL (ref 65–140)
INR PPP: 3.35 (ref 0.84–1.19)
PROTHROMBIN TIME: 32.2 SECONDS (ref 11.6–14.5)

## 2021-10-18 PROCEDURE — 99232 SBSQ HOSP IP/OBS MODERATE 35: CPT | Performed by: INTERNAL MEDICINE

## 2021-10-18 PROCEDURE — 97530 THERAPEUTIC ACTIVITIES: CPT

## 2021-10-18 PROCEDURE — 99239 HOSP IP/OBS DSCHRG MGMT >30: CPT | Performed by: STUDENT IN AN ORGANIZED HEALTH CARE EDUCATION/TRAINING PROGRAM

## 2021-10-18 PROCEDURE — 85610 PROTHROMBIN TIME: CPT | Performed by: NURSE PRACTITIONER

## 2021-10-18 PROCEDURE — 82948 REAGENT STRIP/BLOOD GLUCOSE: CPT

## 2021-10-18 PROCEDURE — 97605 NEG PRS WND THER DME<=50SQCM: CPT | Performed by: PODIATRIST

## 2021-10-18 RX ORDER — CEFAZOLIN SODIUM 2 G/50ML
2000 SOLUTION INTRAVENOUS 2 TIMES WEEKLY
Qty: 300 ML | Refills: 0 | Status: SHIPPED | OUTPATIENT
Start: 2021-10-21 | End: 2021-11-09

## 2021-10-18 RX ORDER — CEFAZOLIN SODIUM 2 G/50ML
2000 SOLUTION INTRAVENOUS 2 TIMES WEEKLY
Qty: 300 ML | Refills: 0 | Status: SHIPPED | OUTPATIENT
Start: 2021-10-19 | End: 2021-10-18

## 2021-10-18 RX ORDER — OXYCODONE HYDROCHLORIDE AND ACETAMINOPHEN 5; 325 MG/1; MG/1
1 TABLET ORAL EVERY 6 HOURS PRN
Qty: 10 TABLET | Refills: 0 | Status: SHIPPED | OUTPATIENT
Start: 2021-10-18 | End: 2022-01-12 | Stop reason: HOSPADM

## 2021-10-18 RX ORDER — DICYCLOMINE HYDROCHLORIDE 10 MG/1
10 CAPSULE ORAL
Qty: 28 CAPSULE | Refills: 0 | Status: SHIPPED | OUTPATIENT
Start: 2021-10-18 | End: 2021-11-13 | Stop reason: HOSPADM

## 2021-10-18 RX ADMIN — LIDOCAINE 1 PATCH: 50 PATCH TOPICAL at 08:12

## 2021-10-18 RX ADMIN — METHOCARBAMOL 500 MG: 500 TABLET, FILM COATED ORAL at 05:16

## 2021-10-18 RX ADMIN — TORSEMIDE 100 MG: 20 TABLET ORAL at 05:16

## 2021-10-18 RX ADMIN — GABAPENTIN 300 MG: 300 CAPSULE ORAL at 08:07

## 2021-10-18 RX ADMIN — LORATADINE 10 MG: 10 TABLET ORAL at 08:07

## 2021-10-18 RX ADMIN — LEVOTHYROXINE SODIUM 50 MCG: 50 TABLET ORAL at 05:16

## 2021-10-18 RX ADMIN — CALCIUM CARBONATE 625 MG: 1250 SUSPENSION ORAL at 08:02

## 2021-10-18 RX ADMIN — SEVELAMER HYDROCHLORIDE 800 MG: 800 TABLET, FILM COATED PARENTERAL at 08:05

## 2021-10-18 RX ADMIN — DOCUSATE SODIUM 100 MG: 100 CAPSULE, LIQUID FILLED ORAL at 08:07

## 2021-10-18 RX ADMIN — NYSTATIN: 100000 POWDER TOPICAL at 08:08

## 2021-10-18 RX ADMIN — SERTRALINE 50 MG: 25 TABLET, FILM COATED ORAL at 08:08

## 2021-10-18 RX ADMIN — DICLOFENAC SODIUM 2 G: 10 GEL TOPICAL at 08:11

## 2021-10-18 RX ADMIN — NIFEDIPINE 30 MG: 30 TABLET, FILM COATED, EXTENDED RELEASE ORAL at 08:07

## 2021-10-18 RX ADMIN — DICYCLOMINE HYDROCHLORIDE 10 MG: 10 CAPSULE ORAL at 08:03

## 2021-10-18 RX ADMIN — OXYCODONE HYDROCHLORIDE AND ACETAMINOPHEN 1 TABLET: 5; 325 TABLET ORAL at 06:24

## 2021-10-18 RX ADMIN — PANTOPRAZOLE SODIUM 40 MG: 40 TABLET, DELAYED RELEASE ORAL at 05:16

## 2021-10-18 RX ADMIN — PREDNISOLONE ACETATE 1 DROP: 10 SUSPENSION/ DROPS OPHTHALMIC at 08:09

## 2021-10-18 RX ADMIN — CINACALCET 30 MG: 30 TABLET, FILM COATED ORAL at 08:06

## 2021-10-18 RX ADMIN — MORPHINE SULFATE 30 MG: 15 TABLET ORAL at 08:23

## 2021-10-18 RX ADMIN — METHOCARBAMOL 500 MG: 500 TABLET, FILM COATED ORAL at 00:09

## 2021-10-19 DIAGNOSIS — M00.9 SEPTIC ARTHRITIS (HCC): Primary | ICD-10-CM

## 2021-10-21 ENCOUNTER — TELEPHONE (OUTPATIENT)
Dept: PODIATRY | Facility: CLINIC | Age: 56
End: 2021-10-21

## 2021-10-21 ENCOUNTER — APPOINTMENT (EMERGENCY)
Dept: RADIOLOGY | Facility: HOSPITAL | Age: 56
DRG: 638 | End: 2021-10-21
Payer: MEDICARE

## 2021-10-21 ENCOUNTER — HOSPITAL ENCOUNTER (INPATIENT)
Facility: HOSPITAL | Age: 56
LOS: 3 days | Discharge: HOME HEALTH | DRG: 638 | End: 2021-10-25
Attending: EMERGENCY MEDICINE | Admitting: INTERNAL MEDICINE
Payer: MEDICARE

## 2021-10-21 ENCOUNTER — TELEPHONE (OUTPATIENT)
Dept: PAIN MEDICINE | Facility: MEDICAL CENTER | Age: 56
End: 2021-10-21

## 2021-10-21 DIAGNOSIS — F41.9 ANXIETY DISORDER: ICD-10-CM

## 2021-10-21 DIAGNOSIS — D64.9 CHRONIC ANEMIA: ICD-10-CM

## 2021-10-21 DIAGNOSIS — R94.31 EKG ABNORMALITIES: ICD-10-CM

## 2021-10-21 DIAGNOSIS — L97.512 RIGHT FOOT ULCER, WITH FAT LAYER EXPOSED (HCC): ICD-10-CM

## 2021-10-21 DIAGNOSIS — R06.00 DYSPNEA: Primary | ICD-10-CM

## 2021-10-21 DIAGNOSIS — Z99.2 ESRD ON DIALYSIS (HCC): ICD-10-CM

## 2021-10-21 DIAGNOSIS — N18.6 ESRD ON DIALYSIS (HCC): ICD-10-CM

## 2021-10-21 LAB
ALBUMIN SERPL BCP-MCNC: 2.4 G/DL (ref 3.5–5)
ALP SERPL-CCNC: 126 U/L (ref 46–116)
ALT SERPL W P-5'-P-CCNC: <6 U/L (ref 12–78)
ANION GAP SERPL CALCULATED.3IONS-SCNC: 7 MMOL/L (ref 4–13)
AST SERPL W P-5'-P-CCNC: 19 U/L (ref 5–45)
BASOPHILS # BLD AUTO: 0.03 THOUSANDS/ΜL (ref 0–0.1)
BASOPHILS NFR BLD AUTO: 0 % (ref 0–1)
BILIRUB SERPL-MCNC: 0.43 MG/DL (ref 0.2–1)
BUN SERPL-MCNC: 18 MG/DL (ref 5–25)
CALCIUM ALBUM COR SERPL-MCNC: 10.9 MG/DL (ref 8.3–10.1)
CALCIUM SERPL-MCNC: 9.6 MG/DL (ref 8.3–10.1)
CHLORIDE SERPL-SCNC: 101 MMOL/L (ref 100–108)
CO2 SERPL-SCNC: 29 MMOL/L (ref 21–32)
CREAT SERPL-MCNC: 4.29 MG/DL (ref 0.6–1.3)
EOSINOPHIL # BLD AUTO: 0.24 THOUSAND/ΜL (ref 0–0.61)
EOSINOPHIL NFR BLD AUTO: 3 % (ref 0–6)
ERYTHROCYTE [DISTWIDTH] IN BLOOD BY AUTOMATED COUNT: 15.8 % (ref 11.6–15.1)
GFR SERPL CREATININE-BSD FRML MDRD: 11 ML/MIN/1.73SQ M
GLUCOSE SERPL-MCNC: 189 MG/DL (ref 65–140)
HCT VFR BLD AUTO: 23.8 % (ref 34.8–46.1)
HGB BLD-MCNC: 7.5 G/DL (ref 11.5–15.4)
IMM GRANULOCYTES # BLD AUTO: 0.08 THOUSAND/UL (ref 0–0.2)
IMM GRANULOCYTES NFR BLD AUTO: 1 % (ref 0–2)
LYMPHOCYTES # BLD AUTO: 0.82 THOUSANDS/ΜL (ref 0.6–4.47)
LYMPHOCYTES NFR BLD AUTO: 10 % (ref 14–44)
MCH RBC QN AUTO: 31.1 PG (ref 26.8–34.3)
MCHC RBC AUTO-ENTMCNC: 31.5 G/DL (ref 31.4–37.4)
MCV RBC AUTO: 99 FL (ref 82–98)
MONOCYTES # BLD AUTO: 0.64 THOUSAND/ΜL (ref 0.17–1.22)
MONOCYTES NFR BLD AUTO: 8 % (ref 4–12)
NEUTROPHILS # BLD AUTO: 6.18 THOUSANDS/ΜL (ref 1.85–7.62)
NEUTS SEG NFR BLD AUTO: 78 % (ref 43–75)
NRBC BLD AUTO-RTO: 0 /100 WBCS
PLATELET # BLD AUTO: 151 THOUSANDS/UL (ref 149–390)
PMV BLD AUTO: 9.9 FL (ref 8.9–12.7)
POTASSIUM SERPL-SCNC: 3.7 MMOL/L (ref 3.5–5.3)
PROT SERPL-MCNC: 6.5 G/DL (ref 6.4–8.2)
RBC # BLD AUTO: 2.41 MILLION/UL (ref 3.81–5.12)
SODIUM SERPL-SCNC: 137 MMOL/L (ref 136–145)
TROPONIN I SERPL-MCNC: <0.02 NG/ML
WBC # BLD AUTO: 7.99 THOUSAND/UL (ref 4.31–10.16)

## 2021-10-21 PROCEDURE — 80053 COMPREHEN METABOLIC PANEL: CPT | Performed by: EMERGENCY MEDICINE

## 2021-10-21 PROCEDURE — 85025 COMPLETE CBC W/AUTO DIFF WBC: CPT | Performed by: EMERGENCY MEDICINE

## 2021-10-21 PROCEDURE — 85610 PROTHROMBIN TIME: CPT | Performed by: EMERGENCY MEDICINE

## 2021-10-21 PROCEDURE — 99285 EMERGENCY DEPT VISIT HI MDM: CPT

## 2021-10-21 PROCEDURE — 99285 EMERGENCY DEPT VISIT HI MDM: CPT | Performed by: EMERGENCY MEDICINE

## 2021-10-21 PROCEDURE — 93005 ELECTROCARDIOGRAM TRACING: CPT

## 2021-10-21 PROCEDURE — 84484 ASSAY OF TROPONIN QUANT: CPT | Performed by: EMERGENCY MEDICINE

## 2021-10-21 PROCEDURE — 99220 PR INITIAL OBSERVATION CARE/DAY 70 MINUTES: CPT | Performed by: INTERNAL MEDICINE

## 2021-10-21 PROCEDURE — 71045 X-RAY EXAM CHEST 1 VIEW: CPT

## 2021-10-21 PROCEDURE — 96374 THER/PROPH/DIAG INJ IV PUSH: CPT

## 2021-10-21 PROCEDURE — 36415 COLL VENOUS BLD VENIPUNCTURE: CPT | Performed by: EMERGENCY MEDICINE

## 2021-10-21 RX ORDER — LORAZEPAM 2 MG/ML
0.5 INJECTION INTRAMUSCULAR ONCE
Status: COMPLETED | OUTPATIENT
Start: 2021-10-21 | End: 2021-10-21

## 2021-10-21 RX ADMIN — LORAZEPAM 0.5 MG: 2 INJECTION INTRAMUSCULAR; INTRAVENOUS at 22:40

## 2021-10-22 ENCOUNTER — APPOINTMENT (INPATIENT)
Dept: NON INVASIVE DIAGNOSTICS | Facility: HOSPITAL | Age: 56
DRG: 638 | End: 2021-10-22
Payer: MEDICARE

## 2021-10-22 LAB
ANION GAP SERPL CALCULATED.3IONS-SCNC: 4 MMOL/L (ref 4–13)
ATRIAL RATE: 74 BPM
ATRIAL RATE: 78 BPM
ATRIAL RATE: 80 BPM
BASOPHILS # BLD AUTO: 0.05 THOUSANDS/ΜL (ref 0–0.1)
BASOPHILS NFR BLD AUTO: 1 % (ref 0–1)
BUN SERPL-MCNC: 18 MG/DL (ref 5–25)
CALCIUM SERPL-MCNC: 8.6 MG/DL (ref 8.3–10.1)
CHLORIDE SERPL-SCNC: 108 MMOL/L (ref 100–108)
CO2 SERPL-SCNC: 29 MMOL/L (ref 21–32)
CREAT SERPL-MCNC: 4.67 MG/DL (ref 0.6–1.3)
EOSINOPHIL # BLD AUTO: 0.28 THOUSAND/ΜL (ref 0–0.61)
EOSINOPHIL NFR BLD AUTO: 3 % (ref 0–6)
ERYTHROCYTE [DISTWIDTH] IN BLOOD BY AUTOMATED COUNT: 15.6 % (ref 11.6–15.1)
ERYTHROCYTE [DISTWIDTH] IN BLOOD BY AUTOMATED COUNT: 15.7 % (ref 11.6–15.1)
FERRITIN SERPL-MCNC: 1328 NG/ML (ref 8–388)
GFR SERPL CREATININE-BSD FRML MDRD: 10 ML/MIN/1.73SQ M
GLUCOSE SERPL-MCNC: 110 MG/DL (ref 65–140)
GLUCOSE SERPL-MCNC: 134 MG/DL (ref 65–140)
GLUCOSE SERPL-MCNC: 184 MG/DL (ref 65–140)
GLUCOSE SERPL-MCNC: 210 MG/DL (ref 65–140)
GLUCOSE SERPL-MCNC: 92 MG/DL (ref 65–140)
HCT VFR BLD AUTO: 20.9 % (ref 34.8–46.1)
HCT VFR BLD AUTO: 22.2 % (ref 34.8–46.1)
HCT VFR BLD AUTO: 22.8 % (ref 34.8–46.1)
HCT VFR BLD AUTO: 24.4 % (ref 34.8–46.1)
HGB BLD-MCNC: 6.6 G/DL (ref 11.5–15.4)
HGB BLD-MCNC: 7 G/DL (ref 11.5–15.4)
HGB BLD-MCNC: 7.1 G/DL (ref 11.5–15.4)
HGB BLD-MCNC: 7.6 G/DL (ref 11.5–15.4)
IMM GRANULOCYTES # BLD AUTO: 0.11 THOUSAND/UL (ref 0–0.2)
IMM GRANULOCYTES NFR BLD AUTO: 1 % (ref 0–2)
INR PPP: 2.83 (ref 0.84–1.19)
IRON SATN MFR SERPL: 25 % (ref 15–50)
IRON SERPL-MCNC: 37 UG/DL (ref 50–170)
LYMPHOCYTES # BLD AUTO: 1.05 THOUSANDS/ΜL (ref 0.6–4.47)
LYMPHOCYTES NFR BLD AUTO: 12 % (ref 14–44)
MCH RBC QN AUTO: 31.7 PG (ref 26.8–34.3)
MCH RBC QN AUTO: 31.9 PG (ref 26.8–34.3)
MCHC RBC AUTO-ENTMCNC: 31.1 G/DL (ref 31.4–37.4)
MCHC RBC AUTO-ENTMCNC: 31.6 G/DL (ref 31.4–37.4)
MCV RBC AUTO: 101 FL (ref 82–98)
MCV RBC AUTO: 102 FL (ref 82–98)
MONOCYTES # BLD AUTO: 0.81 THOUSAND/ΜL (ref 0.17–1.22)
MONOCYTES NFR BLD AUTO: 9 % (ref 4–12)
NEUTROPHILS # BLD AUTO: 6.74 THOUSANDS/ΜL (ref 1.85–7.62)
NEUTS SEG NFR BLD AUTO: 74 % (ref 43–75)
NRBC BLD AUTO-RTO: 0 /100 WBCS
P AXIS: 14 DEGREES
P AXIS: 15 DEGREES
P AXIS: 30 DEGREES
PLATELET # BLD AUTO: 152 THOUSANDS/UL (ref 149–390)
PLATELET # BLD AUTO: 187 THOUSANDS/UL (ref 149–390)
PMV BLD AUTO: 10.3 FL (ref 8.9–12.7)
PMV BLD AUTO: 10.8 FL (ref 8.9–12.7)
POTASSIUM SERPL-SCNC: 3.5 MMOL/L (ref 3.5–5.3)
PR INTERVAL: 168 MS
PR INTERVAL: 182 MS
PR INTERVAL: 184 MS
PROTHROMBIN TIME: 28.3 SECONDS (ref 11.6–14.5)
QRS AXIS: 26 DEGREES
QRS AXIS: 31 DEGREES
QRS AXIS: 33 DEGREES
QRSD INTERVAL: 100 MS
QRSD INTERVAL: 90 MS
QRSD INTERVAL: 98 MS
QT INTERVAL: 364 MS
QT INTERVAL: 366 MS
QT INTERVAL: 374 MS
QTC INTERVAL: 406 MS
QTC INTERVAL: 419 MS
QTC INTERVAL: 426 MS
RBC # BLD AUTO: 2.07 MILLION/UL (ref 3.81–5.12)
RBC # BLD AUTO: 2.24 MILLION/UL (ref 3.81–5.12)
SODIUM SERPL-SCNC: 141 MMOL/L (ref 136–145)
T WAVE AXIS: 105 DEGREES
T WAVE AXIS: 106 DEGREES
T WAVE AXIS: 128 DEGREES
TIBC SERPL-MCNC: 147 UG/DL (ref 250–450)
TROPONIN I SERPL-MCNC: <0.02 NG/ML
TROPONIN I SERPL-MCNC: <0.02 NG/ML
VENTRICULAR RATE: 74 BPM
VENTRICULAR RATE: 78 BPM
VENTRICULAR RATE: 80 BPM
WBC # BLD AUTO: 7.77 THOUSAND/UL (ref 4.31–10.16)
WBC # BLD AUTO: 9.04 THOUSAND/UL (ref 4.31–10.16)

## 2021-10-22 PROCEDURE — 87081 CULTURE SCREEN ONLY: CPT | Performed by: INTERNAL MEDICINE

## 2021-10-22 PROCEDURE — 85027 COMPLETE CBC AUTOMATED: CPT | Performed by: INTERNAL MEDICINE

## 2021-10-22 PROCEDURE — 99222 1ST HOSP IP/OBS MODERATE 55: CPT | Performed by: INTERNAL MEDICINE

## 2021-10-22 PROCEDURE — 36415 COLL VENOUS BLD VENIPUNCTURE: CPT | Performed by: INTERNAL MEDICINE

## 2021-10-22 PROCEDURE — 93306 TTE W/DOPPLER COMPLETE: CPT | Performed by: INTERNAL MEDICINE

## 2021-10-22 PROCEDURE — 80048 BASIC METABOLIC PNL TOTAL CA: CPT | Performed by: INTERNAL MEDICINE

## 2021-10-22 PROCEDURE — 83540 ASSAY OF IRON: CPT | Performed by: INTERNAL MEDICINE

## 2021-10-22 PROCEDURE — 85014 HEMATOCRIT: CPT | Performed by: INTERNAL MEDICINE

## 2021-10-22 PROCEDURE — 83550 IRON BINDING TEST: CPT | Performed by: INTERNAL MEDICINE

## 2021-10-22 PROCEDURE — 93005 ELECTROCARDIOGRAM TRACING: CPT

## 2021-10-22 PROCEDURE — 84484 ASSAY OF TROPONIN QUANT: CPT | Performed by: INTERNAL MEDICINE

## 2021-10-22 PROCEDURE — 99203 OFFICE O/P NEW LOW 30 MIN: CPT | Performed by: PSYCHIATRY & NEUROLOGY

## 2021-10-22 PROCEDURE — 93306 TTE W/DOPPLER COMPLETE: CPT

## 2021-10-22 PROCEDURE — 97605 NEG PRS WND THER DME<=50SQCM: CPT | Performed by: PODIATRIST

## 2021-10-22 PROCEDURE — 82948 REAGENT STRIP/BLOOD GLUCOSE: CPT

## 2021-10-22 PROCEDURE — 82728 ASSAY OF FERRITIN: CPT | Performed by: INTERNAL MEDICINE

## 2021-10-22 PROCEDURE — 2W0SX6Z CHANGE PRESSURE DRESSING ON RIGHT FOOT: ICD-10-PCS | Performed by: STUDENT IN AN ORGANIZED HEALTH CARE EDUCATION/TRAINING PROGRAM

## 2021-10-22 PROCEDURE — 85018 HEMOGLOBIN: CPT | Performed by: INTERNAL MEDICINE

## 2021-10-22 PROCEDURE — 85025 COMPLETE CBC W/AUTO DIFF WBC: CPT | Performed by: INTERNAL MEDICINE

## 2021-10-22 PROCEDURE — 99225 PR SBSQ OBSERVATION CARE/DAY 25 MINUTES: CPT | Performed by: PHYSICIAN ASSISTANT

## 2021-10-22 PROCEDURE — 93010 ELECTROCARDIOGRAM REPORT: CPT | Performed by: INTERNAL MEDICINE

## 2021-10-22 RX ORDER — DICYCLOMINE HYDROCHLORIDE 10 MG/1
10 CAPSULE ORAL
Status: DISCONTINUED | OUTPATIENT
Start: 2021-10-22 | End: 2021-10-25 | Stop reason: HOSPADM

## 2021-10-22 RX ORDER — ONDANSETRON 2 MG/ML
4 INJECTION INTRAMUSCULAR; INTRAVENOUS EVERY 6 HOURS PRN
Status: DISCONTINUED | OUTPATIENT
Start: 2021-10-22 | End: 2021-10-25 | Stop reason: HOSPADM

## 2021-10-22 RX ORDER — CHOLECALCIFEROL (VITAMIN D3) 10 MCG
1 TABLET ORAL
Status: DISCONTINUED | OUTPATIENT
Start: 2021-10-22 | End: 2021-10-25 | Stop reason: HOSPADM

## 2021-10-22 RX ORDER — WARFARIN SODIUM 3 MG/1
9 TABLET ORAL
Status: DISCONTINUED | OUTPATIENT
Start: 2021-10-22 | End: 2021-10-22

## 2021-10-22 RX ORDER — CINACALCET 30 MG/1
30 TABLET, FILM COATED ORAL DAILY
Status: DISCONTINUED | OUTPATIENT
Start: 2021-10-22 | End: 2021-10-25 | Stop reason: HOSPADM

## 2021-10-22 RX ORDER — LORATADINE 10 MG/1
5 TABLET ORAL DAILY
Status: DISCONTINUED | OUTPATIENT
Start: 2021-10-22 | End: 2021-10-25 | Stop reason: HOSPADM

## 2021-10-22 RX ORDER — ALPRAZOLAM 0.25 MG/1
0.25 TABLET ORAL 2 TIMES DAILY PRN
Status: DISCONTINUED | OUTPATIENT
Start: 2021-10-22 | End: 2021-10-25 | Stop reason: HOSPADM

## 2021-10-22 RX ORDER — PANTOPRAZOLE SODIUM 40 MG/1
40 TABLET, DELAYED RELEASE ORAL
Status: DISCONTINUED | OUTPATIENT
Start: 2021-10-22 | End: 2021-10-25 | Stop reason: HOSPADM

## 2021-10-22 RX ORDER — SENNOSIDES 8.6 MG
2 TABLET ORAL
Status: DISCONTINUED | OUTPATIENT
Start: 2021-10-22 | End: 2021-10-25 | Stop reason: HOSPADM

## 2021-10-22 RX ORDER — NYSTATIN 100000 [USP'U]/G
POWDER TOPICAL 2 TIMES DAILY
Status: DISCONTINUED | OUTPATIENT
Start: 2021-10-22 | End: 2021-10-25 | Stop reason: HOSPADM

## 2021-10-22 RX ORDER — OXYCODONE HYDROCHLORIDE 5 MG/1
5 TABLET ORAL EVERY 4 HOURS PRN
Status: DISCONTINUED | OUTPATIENT
Start: 2021-10-22 | End: 2021-10-25 | Stop reason: HOSPADM

## 2021-10-22 RX ORDER — CARVEDILOL 25 MG/1
25 TABLET ORAL 2 TIMES DAILY WITH MEALS
Status: DISCONTINUED | OUTPATIENT
Start: 2021-10-22 | End: 2021-10-25 | Stop reason: HOSPADM

## 2021-10-22 RX ORDER — GABAPENTIN 100 MG/1
100 CAPSULE ORAL
Status: DISCONTINUED | OUTPATIENT
Start: 2021-10-22 | End: 2021-10-25 | Stop reason: HOSPADM

## 2021-10-22 RX ORDER — NIFEDIPINE 30 MG/1
30 TABLET, EXTENDED RELEASE ORAL DAILY
Status: DISCONTINUED | OUTPATIENT
Start: 2021-10-22 | End: 2021-10-25 | Stop reason: HOSPADM

## 2021-10-22 RX ORDER — TORSEMIDE 20 MG/1
100 TABLET ORAL EVERY 12 HOURS
Status: DISCONTINUED | OUTPATIENT
Start: 2021-10-22 | End: 2021-10-25 | Stop reason: HOSPADM

## 2021-10-22 RX ORDER — INSULIN GLARGINE 100 [IU]/ML
20 INJECTION, SOLUTION SUBCUTANEOUS
Status: DISCONTINUED | OUTPATIENT
Start: 2021-10-22 | End: 2021-10-25 | Stop reason: HOSPADM

## 2021-10-22 RX ORDER — SEVELAMER HYDROCHLORIDE 800 MG/1
800 TABLET, FILM COATED ORAL
Status: DISCONTINUED | OUTPATIENT
Start: 2021-10-22 | End: 2021-10-25 | Stop reason: HOSPADM

## 2021-10-22 RX ORDER — ACETAMINOPHEN 325 MG/1
650 TABLET ORAL EVERY 6 HOURS PRN
Status: DISCONTINUED | OUTPATIENT
Start: 2021-10-22 | End: 2021-10-25 | Stop reason: HOSPADM

## 2021-10-22 RX ORDER — ATORVASTATIN CALCIUM 20 MG/1
20 TABLET, FILM COATED ORAL EVERY EVENING
Status: DISCONTINUED | OUTPATIENT
Start: 2021-10-22 | End: 2021-10-25 | Stop reason: HOSPADM

## 2021-10-22 RX ORDER — LEVOTHYROXINE SODIUM 0.05 MG/1
50 TABLET ORAL
Status: DISCONTINUED | OUTPATIENT
Start: 2021-10-22 | End: 2021-10-25 | Stop reason: HOSPADM

## 2021-10-22 RX ORDER — VANCOMYCIN HYDROCHLORIDE 1 G/200ML
10 INJECTION, SOLUTION INTRAVENOUS 3 TIMES WEEKLY
Status: DISCONTINUED | OUTPATIENT
Start: 2021-10-22 | End: 2021-10-22 | Stop reason: SDUPTHER

## 2021-10-22 RX ORDER — VANCOMYCIN HYDROCHLORIDE 1 G/200ML
10 INJECTION, SOLUTION INTRAVENOUS
Status: DISCONTINUED | OUTPATIENT
Start: 2021-10-26 | End: 2021-10-25 | Stop reason: HOSPADM

## 2021-10-22 RX ORDER — OXYCODONE HYDROCHLORIDE 10 MG/1
10 TABLET ORAL EVERY 4 HOURS PRN
Status: DISCONTINUED | OUTPATIENT
Start: 2021-10-22 | End: 2021-10-25 | Stop reason: HOSPADM

## 2021-10-22 RX ORDER — LANOLIN ALCOHOL/MO/W.PET/CERES
3 CREAM (GRAM) TOPICAL
Status: DISCONTINUED | OUTPATIENT
Start: 2021-10-22 | End: 2021-10-25 | Stop reason: HOSPADM

## 2021-10-22 RX ORDER — VANCOMYCIN HYDROCHLORIDE 1 G/200ML
10 INJECTION, SOLUTION INTRAVENOUS ONCE
Status: COMPLETED | OUTPATIENT
Start: 2021-10-23 | End: 2021-10-23

## 2021-10-22 RX ORDER — CEFAZOLIN SODIUM 2 G/50ML
2000 SOLUTION INTRAVENOUS 2 TIMES WEEKLY
Status: DISCONTINUED | OUTPATIENT
Start: 2021-10-25 | End: 2021-10-25 | Stop reason: HOSPADM

## 2021-10-22 RX ORDER — TORSEMIDE 20 MG/1
100 TABLET ORAL EVERY 12 HOURS
Status: DISCONTINUED | OUTPATIENT
Start: 2021-10-22 | End: 2021-10-22

## 2021-10-22 RX ORDER — PREGABALIN 50 MG/1
50 CAPSULE ORAL DAILY
Status: DISCONTINUED | OUTPATIENT
Start: 2021-10-22 | End: 2021-10-25 | Stop reason: HOSPADM

## 2021-10-22 RX ORDER — ALBUTEROL SULFATE 90 UG/1
2 AEROSOL, METERED RESPIRATORY (INHALATION) EVERY 6 HOURS PRN
Status: DISCONTINUED | OUTPATIENT
Start: 2021-10-22 | End: 2021-10-25 | Stop reason: HOSPADM

## 2021-10-22 RX ADMIN — DICYCLOMINE HYDROCHLORIDE 10 MG: 10 CAPSULE ORAL at 01:24

## 2021-10-22 RX ADMIN — OXYCODONE HYDROCHLORIDE 10 MG: 10 TABLET ORAL at 18:18

## 2021-10-22 RX ADMIN — INSULIN GLARGINE 20 UNITS: 100 INJECTION, SOLUTION SUBCUTANEOUS at 21:21

## 2021-10-22 RX ADMIN — OXYCODONE HYDROCHLORIDE 10 MG: 10 TABLET ORAL at 23:30

## 2021-10-22 RX ADMIN — SERTRALINE HYDROCHLORIDE 50 MG: 50 TABLET ORAL at 10:27

## 2021-10-22 RX ADMIN — GABAPENTIN 100 MG: 100 CAPSULE ORAL at 21:21

## 2021-10-22 RX ADMIN — Medication 3 MG: at 21:21

## 2021-10-22 RX ADMIN — INSULIN LISPRO 4 UNITS: 100 INJECTION, SOLUTION INTRAVENOUS; SUBCUTANEOUS at 18:14

## 2021-10-22 RX ADMIN — PANTOPRAZOLE SODIUM 40 MG: 40 TABLET, DELAYED RELEASE ORAL at 05:12

## 2021-10-22 RX ADMIN — NIFEDIPINE 30 MG: 30 TABLET, FILM COATED, EXTENDED RELEASE ORAL at 11:44

## 2021-10-22 RX ADMIN — LEVOTHYROXINE SODIUM 50 MCG: 50 TABLET ORAL at 05:12

## 2021-10-22 RX ADMIN — DICYCLOMINE HYDROCHLORIDE 10 MG: 10 CAPSULE ORAL at 10:27

## 2021-10-22 RX ADMIN — TORSEMIDE 100 MG: 20 TABLET ORAL at 19:52

## 2021-10-22 RX ADMIN — GABAPENTIN 100 MG: 100 CAPSULE ORAL at 01:24

## 2021-10-22 RX ADMIN — ALPRAZOLAM 0.25 MG: 0.25 TABLET ORAL at 10:26

## 2021-10-22 RX ADMIN — WARFARIN SODIUM 9 MG: 5 TABLET ORAL at 18:24

## 2021-10-22 RX ADMIN — NYSTATIN: 100000 POWDER TOPICAL at 11:44

## 2021-10-22 RX ADMIN — INSULIN LISPRO 2 UNITS: 100 INJECTION, SOLUTION INTRAVENOUS; SUBCUTANEOUS at 11:43

## 2021-10-22 RX ADMIN — Medication 1 CAPSULE: at 16:33

## 2021-10-22 RX ADMIN — ATORVASTATIN CALCIUM 20 MG: 20 TABLET, FILM COATED ORAL at 01:24

## 2021-10-22 RX ADMIN — ATORVASTATIN CALCIUM 20 MG: 20 TABLET, FILM COATED ORAL at 16:33

## 2021-10-22 RX ADMIN — SEVELAMER HYDROCHLORIDE 800 MG: 800 TABLET, FILM COATED PARENTERAL at 10:26

## 2021-10-22 RX ADMIN — LORATADINE 5 MG: 10 TABLET ORAL at 10:26

## 2021-10-22 RX ADMIN — CARVEDILOL 25 MG: 25 TABLET, FILM COATED ORAL at 10:27

## 2021-10-22 RX ADMIN — ACETAMINOPHEN 650 MG: 325 TABLET, FILM COATED ORAL at 10:26

## 2021-10-22 RX ADMIN — PREGABALIN 50 MG: 50 CAPSULE ORAL at 10:26

## 2021-10-22 RX ADMIN — DICYCLOMINE HYDROCHLORIDE 10 MG: 10 CAPSULE ORAL at 21:21

## 2021-10-22 RX ADMIN — DICYCLOMINE HYDROCHLORIDE 10 MG: 10 CAPSULE ORAL at 16:33

## 2021-10-22 RX ADMIN — CARVEDILOL 25 MG: 25 TABLET, FILM COATED ORAL at 16:33

## 2021-10-22 RX ADMIN — ACETAMINOPHEN 650 MG: 325 TABLET, FILM COATED ORAL at 01:24

## 2021-10-22 RX ADMIN — TORSEMIDE 100 MG: 20 TABLET ORAL at 10:26

## 2021-10-22 RX ADMIN — INSULIN GLARGINE 20 UNITS: 100 INJECTION, SOLUTION SUBCUTANEOUS at 01:23

## 2021-10-22 RX ADMIN — SEVELAMER HYDROCHLORIDE 800 MG: 800 TABLET, FILM COATED PARENTERAL at 16:33

## 2021-10-22 RX ADMIN — CINACALCET 30 MG: 30 TABLET, FILM COATED ORAL at 10:26

## 2021-10-23 ENCOUNTER — APPOINTMENT (INPATIENT)
Dept: DIALYSIS | Facility: HOSPITAL | Age: 56
DRG: 638 | End: 2021-10-23
Payer: MEDICARE

## 2021-10-23 LAB
ABO GROUP BLD: NORMAL
ANION GAP SERPL CALCULATED.3IONS-SCNC: 4 MMOL/L (ref 4–13)
AORTIC ROOT: 3 CM
AORTIC VALVE MEAN VELOCITY: 15.7 M/S
APICAL FOUR CHAMBER EJECTION FRACTION: 59 %
AV AREA BY CONTINUOUS VTI: 1.9 CM2
AV AREA PEAK VELOCITY: 2 CM2
AV LVOT MEAN GRADIENT: 2 MMHG
AV LVOT PEAK GRADIENT: 5 MMHG
AV MEAN GRADIENT: 11 MMHG
AV PEAK GRADIENT: 21 MMHG
AV VALVE AREA: 1.93 CM2
BASOPHILS # BLD AUTO: 0.04 THOUSANDS/ΜL (ref 0–0.1)
BASOPHILS NFR BLD AUTO: 1 % (ref 0–1)
BLD GP AB SCN SERPL QL: NEGATIVE
BUN SERPL-MCNC: 36 MG/DL (ref 5–25)
CALCIUM SERPL-MCNC: 9.1 MG/DL (ref 8.3–10.1)
CHLORIDE SERPL-SCNC: 103 MMOL/L (ref 100–108)
CO2 SERPL-SCNC: 28 MMOL/L (ref 21–32)
CREAT SERPL-MCNC: 7.09 MG/DL (ref 0.6–1.3)
DOP CALC AO VTI: 47.29 CM
DOP CALC LVOT AREA: 4.15 CM2
DOP CALC LVOT DIAMETER: 2.3 CM
DOP CALC LVOT PEAK VEL VTI: 21.96 CM
DOP CALC LVOT PEAK VEL: 1.08 M/S
DOP CALC LVOT STROKE INDEX: 39.4 ML/M2
DOP CALC LVOT STROKE VOLUME: 91.19 CM3
E WAVE DECELERATION TIME: 197 MS
EOSINOPHIL # BLD AUTO: 0.29 THOUSAND/ΜL (ref 0–0.61)
EOSINOPHIL NFR BLD AUTO: 4 % (ref 0–6)
ERYTHROCYTE [DISTWIDTH] IN BLOOD BY AUTOMATED COUNT: 15.6 % (ref 11.6–15.1)
FRACTIONAL SHORTENING: 34 % (ref 28–44)
GFR SERPL CREATININE-BSD FRML MDRD: 6 ML/MIN/1.73SQ M
GLUCOSE SERPL-MCNC: 124 MG/DL (ref 65–140)
GLUCOSE SERPL-MCNC: 125 MG/DL (ref 65–140)
GLUCOSE SERPL-MCNC: 133 MG/DL (ref 65–140)
GLUCOSE SERPL-MCNC: 159 MG/DL (ref 65–140)
GLUCOSE SERPL-MCNC: 184 MG/DL (ref 65–140)
HCT VFR BLD AUTO: 23 % (ref 34.8–46.1)
HCT VFR BLD AUTO: 25.4 % (ref 34.8–46.1)
HGB BLD-MCNC: 7 G/DL (ref 11.5–15.4)
HGB BLD-MCNC: 7.8 G/DL (ref 11.5–15.4)
IMM GRANULOCYTES # BLD AUTO: 0.05 THOUSAND/UL (ref 0–0.2)
IMM GRANULOCYTES NFR BLD AUTO: 1 % (ref 0–2)
INR PPP: 2.11 (ref 0.84–1.19)
INTERVENTRICULAR SEPTUM IN DIASTOLE (PARASTERNAL SHORT AXIS VIEW): 1.2 CM
LEFT INTERNAL DIMENSION IN SYSTOLE: 3.5 CM (ref 2.1–4)
LEFT VENTRICULAR INTERNAL DIMENSION IN DIASTOLE: 5.3 CM (ref 13.83–20.63)
LEFT VENTRICULAR POSTERIOR WALL IN END DIASTOLE: 1 CM
LEFT VENTRICULAR STROKE VOLUME: 85 ML
LV EF: 58 %
LYMPHOCYTES # BLD AUTO: 1.08 THOUSANDS/ΜL (ref 0.6–4.47)
LYMPHOCYTES NFR BLD AUTO: 13 % (ref 14–44)
MCH RBC QN AUTO: 31.3 PG (ref 26.8–34.3)
MCHC RBC AUTO-ENTMCNC: 30.4 G/DL (ref 31.4–37.4)
MCV RBC AUTO: 103 FL (ref 82–98)
MONOCYTES # BLD AUTO: 0.7 THOUSAND/ΜL (ref 0.17–1.22)
MONOCYTES NFR BLD AUTO: 9 % (ref 4–12)
MRSA NOSE QL CULT: ABNORMAL
MV E'TISSUE VEL-SEP: 4 CM/S
MV PEAK A VEL: 1.12 M/S
MV PEAK E VEL: 92 CM/S
MV STENOSIS PRESSURE HALF TIME: 0 MS
NEUTROPHILS # BLD AUTO: 6.01 THOUSANDS/ΜL (ref 1.85–7.62)
NEUTS SEG NFR BLD AUTO: 72 % (ref 43–75)
NRBC BLD AUTO-RTO: 0 /100 WBCS
PLATELET # BLD AUTO: 154 THOUSANDS/UL (ref 149–390)
PMV BLD AUTO: 10.7 FL (ref 8.9–12.7)
POTASSIUM SERPL-SCNC: 4.4 MMOL/L (ref 3.5–5.3)
PROTHROMBIN TIME: 22.6 SECONDS (ref 11.6–14.5)
RBC # BLD AUTO: 2.24 MILLION/UL (ref 3.81–5.12)
RH BLD: POSITIVE
SL CV LV EF: 65
SL CV PED ECHO LEFT VENTRICLE DIASTOLIC VOLUME (MOD BIPLANE) 2D: 137 ML
SL CV PED ECHO LEFT VENTRICLE SYSTOLIC VOLUME (MOD BIPLANE) 2D: 52 ML
SODIUM SERPL-SCNC: 135 MMOL/L (ref 136–145)
SPECIMEN EXPIRATION DATE: NORMAL
TRICUSPID VALVE S': 1.1 CM/S
WBC # BLD AUTO: 8.17 THOUSAND/UL (ref 4.31–10.16)
Z-SCORE OF LEFT VENTRICULAR DIMENSION IN END SYSTOLE: -11.59

## 2021-10-23 PROCEDURE — 85018 HEMOGLOBIN: CPT | Performed by: INTERNAL MEDICINE

## 2021-10-23 PROCEDURE — 85610 PROTHROMBIN TIME: CPT | Performed by: INTERNAL MEDICINE

## 2021-10-23 PROCEDURE — 86923 COMPATIBILITY TEST ELECTRIC: CPT

## 2021-10-23 PROCEDURE — P9040 RBC LEUKOREDUCED IRRADIATED: HCPCS

## 2021-10-23 PROCEDURE — 80048 BASIC METABOLIC PNL TOTAL CA: CPT | Performed by: INTERNAL MEDICINE

## 2021-10-23 PROCEDURE — 30233N1 TRANSFUSION OF NONAUTOLOGOUS RED BLOOD CELLS INTO PERIPHERAL VEIN, PERCUTANEOUS APPROACH: ICD-10-PCS | Performed by: INTERNAL MEDICINE

## 2021-10-23 PROCEDURE — 99233 SBSQ HOSP IP/OBS HIGH 50: CPT | Performed by: INTERNAL MEDICINE

## 2021-10-23 PROCEDURE — 85025 COMPLETE CBC W/AUTO DIFF WBC: CPT | Performed by: INTERNAL MEDICINE

## 2021-10-23 PROCEDURE — 5A1D70Z PERFORMANCE OF URINARY FILTRATION, INTERMITTENT, LESS THAN 6 HOURS PER DAY: ICD-10-PCS | Performed by: INTERNAL MEDICINE

## 2021-10-23 PROCEDURE — 90935 HEMODIALYSIS ONE EVALUATION: CPT | Performed by: INTERNAL MEDICINE

## 2021-10-23 PROCEDURE — 82948 REAGENT STRIP/BLOOD GLUCOSE: CPT

## 2021-10-23 PROCEDURE — 86900 BLOOD TYPING SEROLOGIC ABO: CPT | Performed by: INTERNAL MEDICINE

## 2021-10-23 PROCEDURE — 85014 HEMATOCRIT: CPT | Performed by: INTERNAL MEDICINE

## 2021-10-23 PROCEDURE — 99232 SBSQ HOSP IP/OBS MODERATE 35: CPT | Performed by: INTERNAL MEDICINE

## 2021-10-23 PROCEDURE — 86850 RBC ANTIBODY SCREEN: CPT | Performed by: INTERNAL MEDICINE

## 2021-10-23 PROCEDURE — 86901 BLOOD TYPING SEROLOGIC RH(D): CPT | Performed by: INTERNAL MEDICINE

## 2021-10-23 RX ADMIN — PANTOPRAZOLE SODIUM 40 MG: 40 TABLET, DELAYED RELEASE ORAL at 06:13

## 2021-10-23 RX ADMIN — CARVEDILOL 25 MG: 25 TABLET, FILM COATED ORAL at 17:57

## 2021-10-23 RX ADMIN — DICYCLOMINE HYDROCHLORIDE 10 MG: 10 CAPSULE ORAL at 21:40

## 2021-10-23 RX ADMIN — LEVOTHYROXINE SODIUM 50 MCG: 50 TABLET ORAL at 06:12

## 2021-10-23 RX ADMIN — CINACALCET 30 MG: 30 TABLET, FILM COATED ORAL at 08:01

## 2021-10-23 RX ADMIN — LORATADINE 5 MG: 10 TABLET ORAL at 08:01

## 2021-10-23 RX ADMIN — INSULIN GLARGINE 20 UNITS: 100 INJECTION, SOLUTION SUBCUTANEOUS at 21:40

## 2021-10-23 RX ADMIN — Medication 3 MG: at 19:33

## 2021-10-23 RX ADMIN — OXYCODONE HYDROCHLORIDE 10 MG: 10 TABLET ORAL at 23:28

## 2021-10-23 RX ADMIN — TORSEMIDE 100 MG: 20 TABLET ORAL at 06:12

## 2021-10-23 RX ADMIN — VANCOMYCIN HYDROCHLORIDE 1000 MG: 1 INJECTION, SOLUTION INTRAVENOUS at 11:25

## 2021-10-23 RX ADMIN — OXYCODONE HYDROCHLORIDE 10 MG: 10 TABLET ORAL at 10:27

## 2021-10-23 RX ADMIN — NYSTATIN: 100000 POWDER TOPICAL at 18:01

## 2021-10-23 RX ADMIN — DICYCLOMINE HYDROCHLORIDE 10 MG: 10 CAPSULE ORAL at 06:12

## 2021-10-23 RX ADMIN — ALPRAZOLAM 0.25 MG: 0.25 TABLET ORAL at 09:35

## 2021-10-23 RX ADMIN — INSULIN LISPRO 2 UNITS: 100 INJECTION, SOLUTION INTRAVENOUS; SUBCUTANEOUS at 17:57

## 2021-10-23 RX ADMIN — SERTRALINE HYDROCHLORIDE 75 MG: 50 TABLET ORAL at 08:01

## 2021-10-23 RX ADMIN — ONDANSETRON 4 MG: 2 INJECTION INTRAMUSCULAR; INTRAVENOUS at 16:39

## 2021-10-23 RX ADMIN — INSULIN LISPRO 2 UNITS: 100 INJECTION, SOLUTION INTRAVENOUS; SUBCUTANEOUS at 14:16

## 2021-10-23 RX ADMIN — SEVELAMER HYDROCHLORIDE 800 MG: 800 TABLET, FILM COATED PARENTERAL at 08:01

## 2021-10-23 RX ADMIN — WARFARIN SODIUM 9 MG: 5 TABLET ORAL at 17:59

## 2021-10-23 RX ADMIN — CEFAZOLIN SODIUM 3000 MG: 1 INJECTION, POWDER, FOR SOLUTION INTRAMUSCULAR; INTRAVENOUS at 12:33

## 2021-10-23 RX ADMIN — OXYCODONE HYDROCHLORIDE 10 MG: 10 TABLET ORAL at 16:45

## 2021-10-23 RX ADMIN — SEVELAMER HYDROCHLORIDE 800 MG: 800 TABLET, FILM COATED PARENTERAL at 14:16

## 2021-10-23 RX ADMIN — ATORVASTATIN CALCIUM 20 MG: 20 TABLET, FILM COATED ORAL at 17:57

## 2021-10-23 RX ADMIN — GABAPENTIN 100 MG: 100 CAPSULE ORAL at 21:40

## 2021-10-23 RX ADMIN — NYSTATIN: 100000 POWDER TOPICAL at 08:03

## 2021-10-23 RX ADMIN — OXYCODONE HYDROCHLORIDE 10 MG: 10 TABLET ORAL at 06:17

## 2021-10-23 RX ADMIN — PREGABALIN 50 MG: 50 CAPSULE ORAL at 08:01

## 2021-10-23 RX ADMIN — TORSEMIDE 100 MG: 20 TABLET ORAL at 19:33

## 2021-10-23 RX ADMIN — ALPRAZOLAM 0.25 MG: 0.25 TABLET ORAL at 23:27

## 2021-10-23 RX ADMIN — DICYCLOMINE HYDROCHLORIDE 10 MG: 10 CAPSULE ORAL at 16:43

## 2021-10-24 LAB
ABO GROUP BLD BPU: NORMAL
BPU ID: NORMAL
CROSSMATCH: NORMAL
GLUCOSE SERPL-MCNC: 124 MG/DL (ref 65–140)
GLUCOSE SERPL-MCNC: 139 MG/DL (ref 65–140)
GLUCOSE SERPL-MCNC: 227 MG/DL (ref 65–140)
GLUCOSE SERPL-MCNC: 61 MG/DL (ref 65–140)
HCT VFR BLD AUTO: 28.6 % (ref 34.8–46.1)
HGB BLD-MCNC: 9.2 G/DL (ref 11.5–15.4)
UNIT DISPENSE STATUS: NORMAL
UNIT PRODUCT CODE: NORMAL
UNIT PRODUCT VOLUME: 350 ML
UNIT RH: NORMAL

## 2021-10-24 PROCEDURE — 85018 HEMOGLOBIN: CPT | Performed by: INTERNAL MEDICINE

## 2021-10-24 PROCEDURE — 99232 SBSQ HOSP IP/OBS MODERATE 35: CPT | Performed by: INTERNAL MEDICINE

## 2021-10-24 PROCEDURE — 85014 HEMATOCRIT: CPT | Performed by: INTERNAL MEDICINE

## 2021-10-24 PROCEDURE — 82948 REAGENT STRIP/BLOOD GLUCOSE: CPT

## 2021-10-24 RX ADMIN — CARVEDILOL 25 MG: 25 TABLET, FILM COATED ORAL at 17:31

## 2021-10-24 RX ADMIN — GABAPENTIN 100 MG: 100 CAPSULE ORAL at 21:19

## 2021-10-24 RX ADMIN — NYSTATIN: 100000 POWDER TOPICAL at 09:24

## 2021-10-24 RX ADMIN — NIFEDIPINE 30 MG: 30 TABLET, FILM COATED, EXTENDED RELEASE ORAL at 09:23

## 2021-10-24 RX ADMIN — ATORVASTATIN CALCIUM 20 MG: 20 TABLET, FILM COATED ORAL at 17:31

## 2021-10-24 RX ADMIN — LEVOTHYROXINE SODIUM 50 MCG: 50 TABLET ORAL at 06:31

## 2021-10-24 RX ADMIN — INSULIN LISPRO 4 UNITS: 100 INJECTION, SOLUTION INTRAVENOUS; SUBCUTANEOUS at 17:32

## 2021-10-24 RX ADMIN — SEVELAMER HYDROCHLORIDE 800 MG: 800 TABLET, FILM COATED PARENTERAL at 17:31

## 2021-10-24 RX ADMIN — DICYCLOMINE HYDROCHLORIDE 10 MG: 10 CAPSULE ORAL at 17:31

## 2021-10-24 RX ADMIN — PREGABALIN 50 MG: 50 CAPSULE ORAL at 09:22

## 2021-10-24 RX ADMIN — SEVELAMER HYDROCHLORIDE 800 MG: 800 TABLET, FILM COATED PARENTERAL at 09:22

## 2021-10-24 RX ADMIN — SEVELAMER HYDROCHLORIDE 800 MG: 800 TABLET, FILM COATED PARENTERAL at 12:45

## 2021-10-24 RX ADMIN — INSULIN GLARGINE 20 UNITS: 100 INJECTION, SOLUTION SUBCUTANEOUS at 21:19

## 2021-10-24 RX ADMIN — PANTOPRAZOLE SODIUM 40 MG: 40 TABLET, DELAYED RELEASE ORAL at 06:30

## 2021-10-24 RX ADMIN — DICYCLOMINE HYDROCHLORIDE 10 MG: 10 CAPSULE ORAL at 12:45

## 2021-10-24 RX ADMIN — CARVEDILOL 25 MG: 25 TABLET, FILM COATED ORAL at 09:22

## 2021-10-24 RX ADMIN — SERTRALINE HYDROCHLORIDE 75 MG: 50 TABLET ORAL at 09:23

## 2021-10-24 RX ADMIN — OXYCODONE HYDROCHLORIDE 5 MG: 5 TABLET ORAL at 15:23

## 2021-10-24 RX ADMIN — OXYCODONE HYDROCHLORIDE 10 MG: 10 TABLET ORAL at 06:28

## 2021-10-24 RX ADMIN — CINACALCET 30 MG: 30 TABLET, FILM COATED ORAL at 09:23

## 2021-10-24 RX ADMIN — LORATADINE 5 MG: 10 TABLET ORAL at 09:22

## 2021-10-24 RX ADMIN — NYSTATIN: 100000 POWDER TOPICAL at 17:33

## 2021-10-24 RX ADMIN — Medication 3 MG: at 20:55

## 2021-10-24 RX ADMIN — TORSEMIDE 100 MG: 20 TABLET ORAL at 06:29

## 2021-10-24 RX ADMIN — DICYCLOMINE HYDROCHLORIDE 10 MG: 10 CAPSULE ORAL at 06:30

## 2021-10-24 RX ADMIN — WARFARIN SODIUM 14 MG: 5 TABLET ORAL at 17:31

## 2021-10-24 RX ADMIN — DICYCLOMINE HYDROCHLORIDE 10 MG: 10 CAPSULE ORAL at 21:19

## 2021-10-25 VITALS
RESPIRATION RATE: 16 BRPM | SYSTOLIC BLOOD PRESSURE: 132 MMHG | HEIGHT: 66 IN | DIASTOLIC BLOOD PRESSURE: 77 MMHG | WEIGHT: 293 LBS | OXYGEN SATURATION: 92 % | BODY MASS INDEX: 47.09 KG/M2 | TEMPERATURE: 97.8 F | HEART RATE: 70 BPM

## 2021-10-25 LAB
ANION GAP SERPL CALCULATED.3IONS-SCNC: 5 MMOL/L (ref 4–13)
BASOPHILS # BLD AUTO: 0.05 THOUSANDS/ΜL (ref 0–0.1)
BASOPHILS NFR BLD AUTO: 1 % (ref 0–1)
BUN SERPL-MCNC: 44 MG/DL (ref 5–25)
CALCIUM SERPL-MCNC: 9.4 MG/DL (ref 8.3–10.1)
CHLORIDE SERPL-SCNC: 101 MMOL/L (ref 100–108)
CO2 SERPL-SCNC: 27 MMOL/L (ref 21–32)
CREAT SERPL-MCNC: 7.76 MG/DL (ref 0.6–1.3)
DME PARACHUTE DELIVERY DATE ACTUAL: NORMAL
DME PARACHUTE DELIVERY DATE REQUESTED: NORMAL
DME PARACHUTE DELIVERY NOTE: NORMAL
DME PARACHUTE ITEM DESCRIPTION: NORMAL
DME PARACHUTE ORDER STATUS: NORMAL
DME PARACHUTE SUPPLIER NAME: NORMAL
DME PARACHUTE SUPPLIER PHONE: NORMAL
EOSINOPHIL # BLD AUTO: 0.37 THOUSAND/ΜL (ref 0–0.61)
EOSINOPHIL NFR BLD AUTO: 4 % (ref 0–6)
ERYTHROCYTE [DISTWIDTH] IN BLOOD BY AUTOMATED COUNT: 14.9 % (ref 11.6–15.1)
GFR SERPL CREATININE-BSD FRML MDRD: 5 ML/MIN/1.73SQ M
GLUCOSE SERPL-MCNC: 108 MG/DL (ref 65–140)
GLUCOSE SERPL-MCNC: 123 MG/DL (ref 65–140)
GLUCOSE SERPL-MCNC: 241 MG/DL (ref 65–140)
GLUCOSE SERPL-MCNC: 75 MG/DL (ref 65–140)
HCT VFR BLD AUTO: 27.5 % (ref 34.8–46.1)
HCT VFR BLD AUTO: 27.5 % (ref 34.8–46.1)
HGB BLD-MCNC: 8.7 G/DL (ref 11.5–15.4)
HGB BLD-MCNC: 8.8 G/DL (ref 11.5–15.4)
IMM GRANULOCYTES # BLD AUTO: 0.05 THOUSAND/UL (ref 0–0.2)
IMM GRANULOCYTES NFR BLD AUTO: 1 % (ref 0–2)
INR PPP: 1.93 (ref 0.84–1.19)
LYMPHOCYTES # BLD AUTO: 1.16 THOUSANDS/ΜL (ref 0.6–4.47)
LYMPHOCYTES NFR BLD AUTO: 12 % (ref 14–44)
MCH RBC QN AUTO: 31.8 PG (ref 26.8–34.3)
MCHC RBC AUTO-ENTMCNC: 32 G/DL (ref 31.4–37.4)
MCV RBC AUTO: 99 FL (ref 82–98)
MONOCYTES # BLD AUTO: 0.8 THOUSAND/ΜL (ref 0.17–1.22)
MONOCYTES NFR BLD AUTO: 8 % (ref 4–12)
NEUTROPHILS # BLD AUTO: 7.25 THOUSANDS/ΜL (ref 1.85–7.62)
NEUTS SEG NFR BLD AUTO: 74 % (ref 43–75)
NRBC BLD AUTO-RTO: 0 /100 WBCS
PLATELET # BLD AUTO: 192 THOUSANDS/UL (ref 149–390)
PMV BLD AUTO: 10 FL (ref 8.9–12.7)
POTASSIUM SERPL-SCNC: 5 MMOL/L (ref 3.5–5.3)
PROTHROMBIN TIME: 21.1 SECONDS (ref 11.6–14.5)
RBC # BLD AUTO: 2.77 MILLION/UL (ref 3.81–5.12)
SODIUM SERPL-SCNC: 133 MMOL/L (ref 136–145)
WBC # BLD AUTO: 9.68 THOUSAND/UL (ref 4.31–10.16)

## 2021-10-25 PROCEDURE — 85018 HEMOGLOBIN: CPT | Performed by: INTERNAL MEDICINE

## 2021-10-25 PROCEDURE — 85025 COMPLETE CBC W/AUTO DIFF WBC: CPT | Performed by: INTERNAL MEDICINE

## 2021-10-25 PROCEDURE — 99239 HOSP IP/OBS DSCHRG MGMT >30: CPT | Performed by: INTERNAL MEDICINE

## 2021-10-25 PROCEDURE — 97605 NEG PRS WND THER DME<=50SQCM: CPT | Performed by: PODIATRIST

## 2021-10-25 PROCEDURE — 80048 BASIC METABOLIC PNL TOTAL CA: CPT | Performed by: INTERNAL MEDICINE

## 2021-10-25 PROCEDURE — 85014 HEMATOCRIT: CPT | Performed by: INTERNAL MEDICINE

## 2021-10-25 PROCEDURE — 82948 REAGENT STRIP/BLOOD GLUCOSE: CPT

## 2021-10-25 PROCEDURE — 85610 PROTHROMBIN TIME: CPT | Performed by: INTERNAL MEDICINE

## 2021-10-25 PROCEDURE — 2W0SX6Z CHANGE PRESSURE DRESSING ON RIGHT FOOT: ICD-10-PCS | Performed by: PODIATRIST

## 2021-10-25 PROCEDURE — 99232 SBSQ HOSP IP/OBS MODERATE 35: CPT | Performed by: STUDENT IN AN ORGANIZED HEALTH CARE EDUCATION/TRAINING PROGRAM

## 2021-10-25 RX ADMIN — WARFARIN SODIUM 9 MG: 5 TABLET ORAL at 17:23

## 2021-10-25 RX ADMIN — DICYCLOMINE HYDROCHLORIDE 10 MG: 10 CAPSULE ORAL at 11:33

## 2021-10-25 RX ADMIN — SEVELAMER HYDROCHLORIDE 800 MG: 800 TABLET, FILM COATED PARENTERAL at 08:54

## 2021-10-25 RX ADMIN — SEVELAMER HYDROCHLORIDE 800 MG: 800 TABLET, FILM COATED PARENTERAL at 11:33

## 2021-10-25 RX ADMIN — TORSEMIDE 100 MG: 20 TABLET ORAL at 06:47

## 2021-10-25 RX ADMIN — SEVELAMER HYDROCHLORIDE 800 MG: 800 TABLET, FILM COATED PARENTERAL at 17:23

## 2021-10-25 RX ADMIN — LEVOTHYROXINE SODIUM 50 MCG: 50 TABLET ORAL at 06:45

## 2021-10-25 RX ADMIN — Medication 1 CAPSULE: at 17:23

## 2021-10-25 RX ADMIN — NYSTATIN: 100000 POWDER TOPICAL at 08:56

## 2021-10-25 RX ADMIN — PANTOPRAZOLE SODIUM 40 MG: 40 TABLET, DELAYED RELEASE ORAL at 06:46

## 2021-10-25 RX ADMIN — CARVEDILOL 25 MG: 25 TABLET, FILM COATED ORAL at 08:55

## 2021-10-25 RX ADMIN — INSULIN LISPRO 4 UNITS: 100 INJECTION, SOLUTION INTRAVENOUS; SUBCUTANEOUS at 17:25

## 2021-10-25 RX ADMIN — CARVEDILOL 25 MG: 25 TABLET, FILM COATED ORAL at 17:23

## 2021-10-25 RX ADMIN — PREGABALIN 50 MG: 50 CAPSULE ORAL at 08:55

## 2021-10-25 RX ADMIN — LORATADINE 5 MG: 10 TABLET ORAL at 08:55

## 2021-10-25 RX ADMIN — DICYCLOMINE HYDROCHLORIDE 10 MG: 10 CAPSULE ORAL at 06:46

## 2021-10-25 RX ADMIN — NIFEDIPINE 30 MG: 30 TABLET, FILM COATED, EXTENDED RELEASE ORAL at 08:56

## 2021-10-25 RX ADMIN — ACETAMINOPHEN 650 MG: 325 TABLET, FILM COATED ORAL at 06:50

## 2021-10-25 RX ADMIN — OXYCODONE HYDROCHLORIDE 10 MG: 10 TABLET ORAL at 08:09

## 2021-10-25 RX ADMIN — OXYCODONE HYDROCHLORIDE 10 MG: 10 TABLET ORAL at 17:28

## 2021-10-25 RX ADMIN — CINACALCET 30 MG: 30 TABLET, FILM COATED ORAL at 08:55

## 2021-10-25 RX ADMIN — SERTRALINE HYDROCHLORIDE 75 MG: 50 TABLET ORAL at 08:54

## 2021-10-25 RX ADMIN — OXYCODONE HYDROCHLORIDE 10 MG: 10 TABLET ORAL at 00:06

## 2021-10-25 RX ADMIN — ATORVASTATIN CALCIUM 20 MG: 20 TABLET, FILM COATED ORAL at 17:23

## 2021-10-25 RX ADMIN — DICYCLOMINE HYDROCHLORIDE 10 MG: 10 CAPSULE ORAL at 17:23

## 2021-10-26 DIAGNOSIS — Z71.89 COMPLEX CARE COORDINATION: Primary | ICD-10-CM

## 2021-10-26 LAB
DME PARACHUTE DELIVERY DATE ACTUAL: NORMAL
DME PARACHUTE DELIVERY DATE REQUESTED: NORMAL
DME PARACHUTE DELIVERY NOTE: NORMAL
DME PARACHUTE ITEM DESCRIPTION: NORMAL
DME PARACHUTE ORDER STATUS: NORMAL
DME PARACHUTE SUPPLIER NAME: NORMAL
DME PARACHUTE SUPPLIER PHONE: NORMAL

## 2021-10-27 ENCOUNTER — TELEPHONE (OUTPATIENT)
Dept: PSYCHIATRY | Facility: CLINIC | Age: 56
End: 2021-10-27

## 2021-10-27 ENCOUNTER — TELEPHONE (OUTPATIENT)
Dept: NEPHROLOGY | Facility: CLINIC | Age: 56
End: 2021-10-27

## 2021-10-29 ENCOUNTER — DOCUMENTATION (OUTPATIENT)
Dept: PODIATRY | Facility: CLINIC | Age: 56
End: 2021-10-29

## 2021-10-29 ENCOUNTER — NURSE TRIAGE (OUTPATIENT)
Dept: OTHER | Facility: OTHER | Age: 56
End: 2021-10-29

## 2021-11-01 LAB
DME PARACHUTE DELIVERY DATE ACTUAL: NORMAL
DME PARACHUTE DELIVERY DATE EXPECTED: NORMAL
DME PARACHUTE DELIVERY DATE REQUESTED: NORMAL
DME PARACHUTE DELIVERY NOTE: NORMAL
DME PARACHUTE ITEM DESCRIPTION: NORMAL
DME PARACHUTE ORDER STATUS: NORMAL
DME PARACHUTE SUPPLIER NAME: NORMAL
DME PARACHUTE SUPPLIER PHONE: NORMAL

## 2021-11-02 ENCOUNTER — DOCUMENTATION (OUTPATIENT)
Dept: NEPHROLOGY | Facility: CLINIC | Age: 56
End: 2021-11-02

## 2021-11-02 ENCOUNTER — TELEPHONE (OUTPATIENT)
Dept: INFECTIOUS DISEASES | Facility: CLINIC | Age: 56
End: 2021-11-02

## 2021-11-02 ENCOUNTER — OFFICE VISIT (OUTPATIENT)
Dept: PODIATRY | Facility: CLINIC | Age: 56
End: 2021-11-02

## 2021-11-02 VITALS
HEART RATE: 70 BPM | BODY MASS INDEX: 44.55 KG/M2 | SYSTOLIC BLOOD PRESSURE: 114 MMHG | DIASTOLIC BLOOD PRESSURE: 70 MMHG | WEIGHT: 276 LBS

## 2021-11-02 DIAGNOSIS — R11.0 NAUSEA: Primary | ICD-10-CM

## 2021-11-02 DIAGNOSIS — Z09 POSTOP CHECK: Primary | ICD-10-CM

## 2021-11-02 PROCEDURE — 99024 POSTOP FOLLOW-UP VISIT: CPT | Performed by: PODIATRIST

## 2021-11-02 RX ORDER — ONDANSETRON 8 MG/1
TABLET, ORALLY DISINTEGRATING ORAL
COMMUNITY
Start: 2021-11-01 | End: 2021-11-13 | Stop reason: HOSPADM

## 2021-11-02 RX ORDER — ONDANSETRON 4 MG/1
4 TABLET, ORALLY DISINTEGRATING ORAL EVERY 6 HOURS PRN
Qty: 30 TABLET | Refills: 1 | Status: SHIPPED | OUTPATIENT
Start: 2021-11-02 | End: 2021-11-21

## 2021-11-02 RX ORDER — PANTOPRAZOLE SODIUM 40 MG/1
TABLET, DELAYED RELEASE ORAL
COMMUNITY
Start: 2021-10-11

## 2021-11-03 ENCOUNTER — TELEMEDICINE (OUTPATIENT)
Dept: INFECTIOUS DISEASES | Facility: CLINIC | Age: 56
End: 2021-11-03
Payer: MEDICARE

## 2021-11-03 VITALS — HEIGHT: 66 IN | WEIGHT: 276 LBS | BODY MASS INDEX: 44.36 KG/M2

## 2021-11-03 DIAGNOSIS — Z99.2 ESRD ON HEMODIALYSIS (HCC): Primary | Chronic | ICD-10-CM

## 2021-11-03 DIAGNOSIS — M00.9 SEPTIC ARTHRITIS (HCC): ICD-10-CM

## 2021-11-03 DIAGNOSIS — N18.6 ESRD ON HEMODIALYSIS (HCC): Primary | Chronic | ICD-10-CM

## 2021-11-03 DIAGNOSIS — Z51.81 THERAPEUTIC DRUG MONITORING: ICD-10-CM

## 2021-11-03 DIAGNOSIS — Z79.2 LONG TERM (CURRENT) USE OF ANTIBIOTICS: ICD-10-CM

## 2021-11-03 DIAGNOSIS — A49.9 POLYMICROBIAL BACTERIAL INFECTION: ICD-10-CM

## 2021-11-03 PROCEDURE — 99214 OFFICE O/P EST MOD 30 MIN: CPT | Performed by: INTERNAL MEDICINE

## 2021-11-08 LAB — FUNGUS SPEC CULT: NORMAL

## 2021-11-09 ENCOUNTER — TELEPHONE (OUTPATIENT)
Dept: INFECTIOUS DISEASES | Facility: CLINIC | Age: 56
End: 2021-11-09

## 2021-11-10 ENCOUNTER — APPOINTMENT (EMERGENCY)
Dept: RADIOLOGY | Facility: HOSPITAL | Age: 56
DRG: 073 | End: 2021-11-10
Payer: MEDICARE

## 2021-11-10 ENCOUNTER — HOSPITAL ENCOUNTER (INPATIENT)
Facility: HOSPITAL | Age: 56
LOS: 2 days | Discharge: HOME HEALTH | DRG: 073 | End: 2021-11-13
Attending: EMERGENCY MEDICINE | Admitting: INTERNAL MEDICINE
Payer: MEDICARE

## 2021-11-10 ENCOUNTER — TELEPHONE (OUTPATIENT)
Dept: PODIATRY | Facility: CLINIC | Age: 56
End: 2021-11-10

## 2021-11-10 DIAGNOSIS — R10.84 GENERALIZED ABDOMINAL PAIN: ICD-10-CM

## 2021-11-10 DIAGNOSIS — L29.9 PRURITUS: ICD-10-CM

## 2021-11-10 DIAGNOSIS — Z79.4 TYPE 2 DIABETES MELLITUS WITH DIABETIC POLYNEUROPATHY, WITH LONG-TERM CURRENT USE OF INSULIN (HCC): ICD-10-CM

## 2021-11-10 DIAGNOSIS — R11.2 INTRACTABLE NAUSEA AND VOMITING: ICD-10-CM

## 2021-11-10 DIAGNOSIS — Z99.2 ESRD ON HEMODIALYSIS (HCC): ICD-10-CM

## 2021-11-10 DIAGNOSIS — L97.512 RIGHT FOOT ULCER, WITH FAT LAYER EXPOSED (HCC): Primary | ICD-10-CM

## 2021-11-10 DIAGNOSIS — E11.9 TYPE 2 DIABETES MELLITUS (HCC): ICD-10-CM

## 2021-11-10 DIAGNOSIS — I12.9 PARENCHYMAL RENAL HYPERTENSION: ICD-10-CM

## 2021-11-10 DIAGNOSIS — L29.9 GENERALIZED PRURITUS: ICD-10-CM

## 2021-11-10 DIAGNOSIS — S91.309A WOUND OF FOOT: ICD-10-CM

## 2021-11-10 DIAGNOSIS — E11.42 TYPE 2 DIABETES MELLITUS WITH DIABETIC POLYNEUROPATHY, WITH LONG-TERM CURRENT USE OF INSULIN (HCC): ICD-10-CM

## 2021-11-10 DIAGNOSIS — R11.2 NAUSEA & VOMITING: ICD-10-CM

## 2021-11-10 DIAGNOSIS — N18.6 ESRD ON HEMODIALYSIS (HCC): ICD-10-CM

## 2021-11-10 DIAGNOSIS — K59.00 CONSTIPATION: ICD-10-CM

## 2021-11-10 DIAGNOSIS — G47.00 INSOMNIA: ICD-10-CM

## 2021-11-10 PROBLEM — S90.851A: Status: ACTIVE | Noted: 2019-08-26

## 2021-11-10 LAB
ALBUMIN SERPL BCP-MCNC: 2.7 G/DL (ref 3.5–5)
ALP SERPL-CCNC: 143 U/L (ref 46–116)
ALT SERPL W P-5'-P-CCNC: 13 U/L (ref 12–78)
ANION GAP SERPL CALCULATED.3IONS-SCNC: 4 MMOL/L (ref 4–13)
AST SERPL W P-5'-P-CCNC: 18 U/L (ref 5–45)
ATRIAL RATE: 73 BPM
BASOPHILS # BLD AUTO: 0.07 THOUSANDS/ΜL (ref 0–0.1)
BASOPHILS NFR BLD AUTO: 1 % (ref 0–1)
BILIRUB DIRECT SERPL-MCNC: 0.09 MG/DL (ref 0–0.2)
BILIRUB SERPL-MCNC: 0.3 MG/DL (ref 0.2–1)
BUN SERPL-MCNC: 26 MG/DL (ref 5–25)
CALCIUM SERPL-MCNC: 9.5 MG/DL (ref 8.3–10.1)
CHLORIDE SERPL-SCNC: 106 MMOL/L (ref 100–108)
CO2 SERPL-SCNC: 28 MMOL/L (ref 21–32)
CREAT SERPL-MCNC: 6.01 MG/DL (ref 0.6–1.3)
CRP SERPL QL: 16.8 MG/L
EOSINOPHIL # BLD AUTO: 0.22 THOUSAND/ΜL (ref 0–0.61)
EOSINOPHIL NFR BLD AUTO: 4 % (ref 0–6)
ERYTHROCYTE [DISTWIDTH] IN BLOOD BY AUTOMATED COUNT: 14.4 % (ref 11.6–15.1)
ERYTHROCYTE [SEDIMENTATION RATE] IN BLOOD: 53 MM/HOUR (ref 0–29)
GFR SERPL CREATININE-BSD FRML MDRD: 7 ML/MIN/1.73SQ M
GLUCOSE SERPL-MCNC: 208 MG/DL (ref 65–140)
GLUCOSE SERPL-MCNC: 225 MG/DL (ref 65–140)
GLUCOSE SERPL-MCNC: 248 MG/DL (ref 65–140)
GLUCOSE SERPL-MCNC: 260 MG/DL (ref 65–140)
HCT VFR BLD AUTO: 30.6 % (ref 34.8–46.1)
HGB BLD-MCNC: 9.7 G/DL (ref 11.5–15.4)
IMM GRANULOCYTES # BLD AUTO: 0.03 THOUSAND/UL (ref 0–0.2)
IMM GRANULOCYTES NFR BLD AUTO: 1 % (ref 0–2)
INR PPP: 2.6 (ref 0.84–1.19)
LIPASE SERPL-CCNC: 78 U/L (ref 73–393)
LYMPHOCYTES # BLD AUTO: 0.99 THOUSANDS/ΜL (ref 0.6–4.47)
LYMPHOCYTES NFR BLD AUTO: 17 % (ref 14–44)
MCH RBC QN AUTO: 31.2 PG (ref 26.8–34.3)
MCHC RBC AUTO-ENTMCNC: 31.7 G/DL (ref 31.4–37.4)
MCV RBC AUTO: 98 FL (ref 82–98)
MONOCYTES # BLD AUTO: 0.52 THOUSAND/ΜL (ref 0.17–1.22)
MONOCYTES NFR BLD AUTO: 9 % (ref 4–12)
NEUTROPHILS # BLD AUTO: 4.1 THOUSANDS/ΜL (ref 1.85–7.62)
NEUTS SEG NFR BLD AUTO: 68 % (ref 43–75)
NRBC BLD AUTO-RTO: 0 /100 WBCS
P AXIS: 26 DEGREES
PLATELET # BLD AUTO: 242 THOUSANDS/UL (ref 149–390)
PMV BLD AUTO: 11.3 FL (ref 8.9–12.7)
POTASSIUM SERPL-SCNC: 4.4 MMOL/L (ref 3.5–5.3)
PR INTERVAL: 154 MS
PROT SERPL-MCNC: 7 G/DL (ref 6.4–8.2)
PROTHROMBIN TIME: 26.5 SECONDS (ref 11.6–14.5)
QRS AXIS: 35 DEGREES
QRSD INTERVAL: 94 MS
QT INTERVAL: 398 MS
QTC INTERVAL: 438 MS
RBC # BLD AUTO: 3.11 MILLION/UL (ref 3.81–5.12)
SODIUM SERPL-SCNC: 138 MMOL/L (ref 136–145)
T WAVE AXIS: 97 DEGREES
VENTRICULAR RATE: 73 BPM
WBC # BLD AUTO: 5.93 THOUSAND/UL (ref 4.31–10.16)

## 2021-11-10 PROCEDURE — 85610 PROTHROMBIN TIME: CPT | Performed by: EMERGENCY MEDICINE

## 2021-11-10 PROCEDURE — 97605 NEG PRS WND THER DME<=50SQCM: CPT | Performed by: PODIATRIST

## 2021-11-10 PROCEDURE — 99220 PR INITIAL OBSERVATION CARE/DAY 70 MINUTES: CPT | Performed by: INTERNAL MEDICINE

## 2021-11-10 PROCEDURE — 93010 ELECTROCARDIOGRAM REPORT: CPT | Performed by: INTERNAL MEDICINE

## 2021-11-10 PROCEDURE — 80076 HEPATIC FUNCTION PANEL: CPT | Performed by: EMERGENCY MEDICINE

## 2021-11-10 PROCEDURE — 85652 RBC SED RATE AUTOMATED: CPT | Performed by: EMERGENCY MEDICINE

## 2021-11-10 PROCEDURE — 96374 THER/PROPH/DIAG INJ IV PUSH: CPT

## 2021-11-10 PROCEDURE — 96375 TX/PRO/DX INJ NEW DRUG ADDON: CPT

## 2021-11-10 PROCEDURE — 93005 ELECTROCARDIOGRAM TRACING: CPT

## 2021-11-10 PROCEDURE — 82948 REAGENT STRIP/BLOOD GLUCOSE: CPT

## 2021-11-10 PROCEDURE — 83690 ASSAY OF LIPASE: CPT | Performed by: EMERGENCY MEDICINE

## 2021-11-10 PROCEDURE — 74176 CT ABD & PELVIS W/O CONTRAST: CPT

## 2021-11-10 PROCEDURE — 99285 EMERGENCY DEPT VISIT HI MDM: CPT

## 2021-11-10 PROCEDURE — 85025 COMPLETE CBC W/AUTO DIFF WBC: CPT | Performed by: EMERGENCY MEDICINE

## 2021-11-10 PROCEDURE — 36415 COLL VENOUS BLD VENIPUNCTURE: CPT | Performed by: EMERGENCY MEDICINE

## 2021-11-10 PROCEDURE — 86140 C-REACTIVE PROTEIN: CPT | Performed by: EMERGENCY MEDICINE

## 2021-11-10 PROCEDURE — 80048 BASIC METABOLIC PNL TOTAL CA: CPT | Performed by: EMERGENCY MEDICINE

## 2021-11-10 PROCEDURE — 99285 EMERGENCY DEPT VISIT HI MDM: CPT | Performed by: EMERGENCY MEDICINE

## 2021-11-10 RX ORDER — ATROPINE SULFATE 10 MG/ML
1 SOLUTION/ DROPS OPHTHALMIC
Status: DISCONTINUED | OUTPATIENT
Start: 2021-11-10 | End: 2021-11-13 | Stop reason: HOSPADM

## 2021-11-10 RX ORDER — DICYCLOMINE HYDROCHLORIDE 10 MG/1
10 CAPSULE ORAL
Status: DISCONTINUED | OUTPATIENT
Start: 2021-11-10 | End: 2021-11-11

## 2021-11-10 RX ORDER — ONDANSETRON 4 MG/1
4 TABLET, ORALLY DISINTEGRATING ORAL EVERY 6 HOURS PRN
Status: DISCONTINUED | OUTPATIENT
Start: 2021-11-10 | End: 2021-11-10

## 2021-11-10 RX ORDER — PANTOPRAZOLE SODIUM 40 MG/1
40 TABLET, DELAYED RELEASE ORAL
Status: DISCONTINUED | OUTPATIENT
Start: 2021-11-10 | End: 2021-11-13 | Stop reason: HOSPADM

## 2021-11-10 RX ORDER — UREA/ALLANTOIN/VIT E ACETATE 25 %
CREAM (GRAM) TOPICAL ONCE
Status: DISCONTINUED | OUTPATIENT
Start: 2021-11-10 | End: 2021-11-10 | Stop reason: RX

## 2021-11-10 RX ORDER — SEVELAMER HYDROCHLORIDE 800 MG/1
800 TABLET, FILM COATED ORAL
Status: DISCONTINUED | OUTPATIENT
Start: 2021-11-10 | End: 2021-11-10

## 2021-11-10 RX ORDER — SODIUM POLYSTYRENE SULFONATE 15 G/60ML
15 SUSPENSION ORAL; RECTAL
Status: DISCONTINUED | OUTPATIENT
Start: 2021-11-10 | End: 2021-11-11

## 2021-11-10 RX ORDER — SEVELAMER HYDROCHLORIDE 800 MG/1
800 TABLET, FILM COATED ORAL
Status: DISCONTINUED | OUTPATIENT
Start: 2021-11-10 | End: 2021-11-11

## 2021-11-10 RX ORDER — ACETAMINOPHEN 325 MG/1
650 TABLET ORAL EVERY 6 HOURS PRN
Status: DISCONTINUED | OUTPATIENT
Start: 2021-11-10 | End: 2021-11-13 | Stop reason: HOSPADM

## 2021-11-10 RX ORDER — NYSTATIN 100000 [USP'U]/G
POWDER TOPICAL 2 TIMES DAILY
Status: DISCONTINUED | OUTPATIENT
Start: 2021-11-10 | End: 2021-11-13 | Stop reason: HOSPADM

## 2021-11-10 RX ORDER — ALPRAZOLAM 0.25 MG/1
0.25 TABLET ORAL 2 TIMES DAILY PRN
Status: DISCONTINUED | OUTPATIENT
Start: 2021-11-10 | End: 2021-11-13 | Stop reason: HOSPADM

## 2021-11-10 RX ORDER — POLYETHYLENE GLYCOL 3350 17 G/17G
17 POWDER, FOR SOLUTION ORAL DAILY
Status: DISCONTINUED | OUTPATIENT
Start: 2021-11-11 | End: 2021-11-13 | Stop reason: HOSPADM

## 2021-11-10 RX ORDER — CARVEDILOL 25 MG/1
25 TABLET ORAL 2 TIMES DAILY WITH MEALS
Status: DISCONTINUED | OUTPATIENT
Start: 2021-11-10 | End: 2021-11-13 | Stop reason: HOSPADM

## 2021-11-10 RX ORDER — HYDROXYZINE HYDROCHLORIDE 25 MG/1
25 TABLET, FILM COATED ORAL ONCE
Status: COMPLETED | OUTPATIENT
Start: 2021-11-10 | End: 2021-11-10

## 2021-11-10 RX ORDER — SENNOSIDES 8.6 MG
2 TABLET ORAL
Status: DISCONTINUED | OUTPATIENT
Start: 2021-11-10 | End: 2021-11-13 | Stop reason: HOSPADM

## 2021-11-10 RX ORDER — ONDANSETRON 2 MG/ML
4 INJECTION INTRAMUSCULAR; INTRAVENOUS ONCE
Status: COMPLETED | OUTPATIENT
Start: 2021-11-10 | End: 2021-11-10

## 2021-11-10 RX ORDER — GABAPENTIN 100 MG/1
100 CAPSULE ORAL 3 TIMES DAILY
Status: DISCONTINUED | OUTPATIENT
Start: 2021-11-10 | End: 2021-11-13 | Stop reason: HOSPADM

## 2021-11-10 RX ORDER — NIFEDIPINE 30 MG/1
30 TABLET, EXTENDED RELEASE ORAL DAILY
Status: DISCONTINUED | OUTPATIENT
Start: 2021-11-11 | End: 2021-11-13 | Stop reason: HOSPADM

## 2021-11-10 RX ORDER — SIMETHICONE 80 MG
80 TABLET,CHEWABLE ORAL 4 TIMES DAILY PRN
Status: DISCONTINUED | OUTPATIENT
Start: 2021-11-10 | End: 2021-11-13 | Stop reason: HOSPADM

## 2021-11-10 RX ORDER — LIDOCAINE 50 MG/G
2 PATCH TOPICAL DAILY
Status: DISCONTINUED | OUTPATIENT
Start: 2021-11-11 | End: 2021-11-13 | Stop reason: HOSPADM

## 2021-11-10 RX ORDER — IODINE/SODIUM IODIDE 2 %
TINCTURE TOPICAL 4 TIMES DAILY
Status: DISCONTINUED | OUTPATIENT
Start: 2021-11-10 | End: 2021-11-13 | Stop reason: HOSPADM

## 2021-11-10 RX ORDER — TRAMADOL HYDROCHLORIDE 50 MG/1
50 TABLET ORAL EVERY 8 HOURS PRN
Status: DISCONTINUED | OUTPATIENT
Start: 2021-11-10 | End: 2021-11-12

## 2021-11-10 RX ORDER — DOCUSATE SODIUM 100 MG/1
100 CAPSULE, LIQUID FILLED ORAL 2 TIMES DAILY
Status: DISCONTINUED | OUTPATIENT
Start: 2021-11-10 | End: 2021-11-12

## 2021-11-10 RX ORDER — METOCLOPRAMIDE HYDROCHLORIDE 5 MG/ML
10 INJECTION INTRAMUSCULAR; INTRAVENOUS EVERY 8 HOURS PRN
Status: DISCONTINUED | OUTPATIENT
Start: 2021-11-10 | End: 2021-11-11

## 2021-11-10 RX ORDER — PREDNISOLONE ACETATE 10 MG/ML
1 SUSPENSION/ DROPS OPHTHALMIC 4 TIMES DAILY
Status: DISCONTINUED | OUTPATIENT
Start: 2021-11-10 | End: 2021-11-13 | Stop reason: HOSPADM

## 2021-11-10 RX ORDER — BENZONATATE 100 MG/1
100 CAPSULE ORAL 3 TIMES DAILY PRN
Status: DISCONTINUED | OUTPATIENT
Start: 2021-11-10 | End: 2021-11-13 | Stop reason: HOSPADM

## 2021-11-10 RX ORDER — OXYCODONE HYDROCHLORIDE AND ACETAMINOPHEN 5; 325 MG/1; MG/1
1 TABLET ORAL EVERY 6 HOURS PRN
Status: DISCONTINUED | OUTPATIENT
Start: 2021-11-10 | End: 2021-11-13 | Stop reason: HOSPADM

## 2021-11-10 RX ORDER — CINACALCET 30 MG/1
30 TABLET, FILM COATED ORAL DAILY
Status: DISCONTINUED | OUTPATIENT
Start: 2021-11-11 | End: 2021-11-13 | Stop reason: HOSPADM

## 2021-11-10 RX ORDER — ATORVASTATIN CALCIUM 20 MG/1
20 TABLET, FILM COATED ORAL EVERY EVENING
Status: DISCONTINUED | OUTPATIENT
Start: 2021-11-10 | End: 2021-11-13 | Stop reason: HOSPADM

## 2021-11-10 RX ORDER — LEVOTHYROXINE SODIUM 0.05 MG/1
50 TABLET ORAL
Status: DISCONTINUED | OUTPATIENT
Start: 2021-11-11 | End: 2021-11-13 | Stop reason: HOSPADM

## 2021-11-10 RX ORDER — METOCLOPRAMIDE HYDROCHLORIDE 5 MG/ML
10 INJECTION INTRAMUSCULAR; INTRAVENOUS ONCE
Status: COMPLETED | OUTPATIENT
Start: 2021-11-10 | End: 2021-11-10

## 2021-11-10 RX ORDER — ECHINACEA PURPUREA EXTRACT 125 MG
1 TABLET ORAL 3 TIMES DAILY PRN
Status: DISCONTINUED | OUTPATIENT
Start: 2021-11-10 | End: 2021-11-13 | Stop reason: HOSPADM

## 2021-11-10 RX ORDER — LORAZEPAM 0.5 MG/1
0.5 TABLET ORAL ONCE
Status: COMPLETED | OUTPATIENT
Start: 2021-11-10 | End: 2021-11-10

## 2021-11-10 RX ORDER — PREGABALIN 50 MG/1
50 CAPSULE ORAL DAILY
Status: DISCONTINUED | OUTPATIENT
Start: 2021-11-11 | End: 2021-11-13 | Stop reason: HOSPADM

## 2021-11-10 RX ORDER — BRIMONIDINE TARTRATE 2 MG/ML
1 SOLUTION/ DROPS OPHTHALMIC 2 TIMES DAILY
Status: DISCONTINUED | OUTPATIENT
Start: 2021-11-10 | End: 2021-11-13 | Stop reason: HOSPADM

## 2021-11-10 RX ORDER — DICYCLOMINE HYDROCHLORIDE 10 MG/1
10 CAPSULE ORAL
Status: DISCONTINUED | OUTPATIENT
Start: 2021-11-10 | End: 2021-11-10

## 2021-11-10 RX ORDER — ONDANSETRON 2 MG/ML
4 INJECTION INTRAMUSCULAR; INTRAVENOUS EVERY 6 HOURS PRN
Status: DISCONTINUED | OUTPATIENT
Start: 2021-11-10 | End: 2021-11-13 | Stop reason: HOSPADM

## 2021-11-10 RX ORDER — ALBUTEROL SULFATE 90 UG/1
2 AEROSOL, METERED RESPIRATORY (INHALATION) EVERY 6 HOURS PRN
Status: DISCONTINUED | OUTPATIENT
Start: 2021-11-10 | End: 2021-11-13 | Stop reason: HOSPADM

## 2021-11-10 RX ORDER — CHOLECALCIFEROL (VITAMIN D3) 10 MCG
1 TABLET ORAL
Status: DISCONTINUED | OUTPATIENT
Start: 2021-11-10 | End: 2021-11-13 | Stop reason: HOSPADM

## 2021-11-10 RX ORDER — LORATADINE 10 MG/1
10 TABLET ORAL DAILY
Status: DISCONTINUED | OUTPATIENT
Start: 2021-11-11 | End: 2021-11-13 | Stop reason: HOSPADM

## 2021-11-10 RX ORDER — INSULIN GLARGINE 100 [IU]/ML
20 INJECTION, SOLUTION SUBCUTANEOUS
Status: DISCONTINUED | OUTPATIENT
Start: 2021-11-10 | End: 2021-11-12

## 2021-11-10 RX ORDER — HYDROXYZINE HYDROCHLORIDE 25 MG/1
25 TABLET, FILM COATED ORAL EVERY 6 HOURS PRN
Status: DISCONTINUED | OUTPATIENT
Start: 2021-11-10 | End: 2021-11-13 | Stop reason: HOSPADM

## 2021-11-10 RX ORDER — CALCIUM CARBONATE 1250 MG/5ML
1250 SUSPENSION ORAL
Status: DISCONTINUED | OUTPATIENT
Start: 2021-11-10 | End: 2021-11-13 | Stop reason: HOSPADM

## 2021-11-10 RX ADMIN — DICYCLOMINE HYDROCHLORIDE 10 MG: 10 CAPSULE ORAL at 21:53

## 2021-11-10 RX ADMIN — CALCIUM CARBONATE 1250 MG: 1250 SUSPENSION ORAL at 21:41

## 2021-11-10 RX ADMIN — STANDARDIZED SENNA CONCENTRATE 17.2 MG: 8.6 TABLET ORAL at 21:40

## 2021-11-10 RX ADMIN — PREDNISOLONE ACETATE 1 DROP: 10 SUSPENSION/ DROPS OPHTHALMIC at 21:42

## 2021-11-10 RX ADMIN — ATORVASTATIN CALCIUM 20 MG: 20 TABLET, FILM COATED ORAL at 19:39

## 2021-11-10 RX ADMIN — INSULIN GLARGINE 20 UNITS: 100 INJECTION, SOLUTION SUBCUTANEOUS at 21:38

## 2021-11-10 RX ADMIN — Medication 1 CAPSULE: at 21:39

## 2021-11-10 RX ADMIN — METOCLOPRAMIDE HYDROCHLORIDE 10 MG: 5 INJECTION INTRAMUSCULAR; INTRAVENOUS at 13:34

## 2021-11-10 RX ADMIN — OXYCODONE HYDROCHLORIDE AND ACETAMINOPHEN 1 TABLET: 5; 325 TABLET ORAL at 19:39

## 2021-11-10 RX ADMIN — HYDROXYZINE HYDROCHLORIDE 25 MG: 25 TABLET ORAL at 11:53

## 2021-11-10 RX ADMIN — PANTOPRAZOLE SODIUM 40 MG: 40 TABLET, DELAYED RELEASE ORAL at 19:39

## 2021-11-10 RX ADMIN — ACETAMINOPHEN 650 MG: 325 TABLET, FILM COATED ORAL at 19:39

## 2021-11-10 RX ADMIN — FERRIC OXIDE RED: 8; 8 LOTION TOPICAL at 21:42

## 2021-11-10 RX ADMIN — HYDROXYZINE HYDROCHLORIDE 25 MG: 25 TABLET ORAL at 19:39

## 2021-11-10 RX ADMIN — LORAZEPAM 0.5 MG: 0.5 TABLET ORAL at 14:25

## 2021-11-10 RX ADMIN — ONDANSETRON 4 MG: 2 INJECTION INTRAMUSCULAR; INTRAVENOUS at 12:00

## 2021-11-10 RX ADMIN — NYSTATIN: 100000 POWDER TOPICAL at 21:41

## 2021-11-10 RX ADMIN — BRIMONIDINE TARTRATE 1 DROP: 2 SOLUTION OPHTHALMIC at 21:42

## 2021-11-10 RX ADMIN — DOCUSATE SODIUM 100 MG: 100 CAPSULE ORAL at 19:39

## 2021-11-11 ENCOUNTER — APPOINTMENT (OUTPATIENT)
Dept: DIALYSIS | Facility: HOSPITAL | Age: 56
DRG: 073 | End: 2021-11-11
Payer: MEDICARE

## 2021-11-11 PROBLEM — L29.9 PRURITUS: Status: ACTIVE | Noted: 2021-11-11

## 2021-11-11 LAB
ANION GAP SERPL CALCULATED.3IONS-SCNC: 7 MMOL/L (ref 4–13)
BASOPHILS # BLD AUTO: 0.05 THOUSANDS/ΜL (ref 0–0.1)
BASOPHILS NFR BLD AUTO: 1 % (ref 0–1)
BUN SERPL-MCNC: 29 MG/DL (ref 5–25)
CALCIUM SERPL-MCNC: 8.4 MG/DL (ref 8.3–10.1)
CHLORIDE SERPL-SCNC: 107 MMOL/L (ref 100–108)
CO2 SERPL-SCNC: 28 MMOL/L (ref 21–32)
CREAT SERPL-MCNC: 7.64 MG/DL (ref 0.6–1.3)
EOSINOPHIL # BLD AUTO: 0.23 THOUSAND/ΜL (ref 0–0.61)
EOSINOPHIL NFR BLD AUTO: 5 % (ref 0–6)
ERYTHROCYTE [DISTWIDTH] IN BLOOD BY AUTOMATED COUNT: 14.5 % (ref 11.6–15.1)
GFR SERPL CREATININE-BSD FRML MDRD: 5 ML/MIN/1.73SQ M
GLUCOSE SERPL-MCNC: 109 MG/DL (ref 65–140)
GLUCOSE SERPL-MCNC: 109 MG/DL (ref 65–140)
GLUCOSE SERPL-MCNC: 114 MG/DL (ref 65–140)
GLUCOSE SERPL-MCNC: 125 MG/DL (ref 65–140)
GLUCOSE SERPL-MCNC: 193 MG/DL (ref 65–140)
HCT VFR BLD AUTO: 25.9 % (ref 34.8–46.1)
HGB BLD-MCNC: 8.2 G/DL (ref 11.5–15.4)
IMM GRANULOCYTES # BLD AUTO: 0.02 THOUSAND/UL (ref 0–0.2)
IMM GRANULOCYTES NFR BLD AUTO: 0 % (ref 0–2)
INR PPP: 2.94 (ref 0.84–1.19)
LYMPHOCYTES # BLD AUTO: 0.89 THOUSANDS/ΜL (ref 0.6–4.47)
LYMPHOCYTES NFR BLD AUTO: 18 % (ref 14–44)
MCH RBC QN AUTO: 30.7 PG (ref 26.8–34.3)
MCHC RBC AUTO-ENTMCNC: 31.7 G/DL (ref 31.4–37.4)
MCV RBC AUTO: 97 FL (ref 82–98)
MONOCYTES # BLD AUTO: 0.52 THOUSAND/ΜL (ref 0.17–1.22)
MONOCYTES NFR BLD AUTO: 10 % (ref 4–12)
NEUTROPHILS # BLD AUTO: 3.3 THOUSANDS/ΜL (ref 1.85–7.62)
NEUTS SEG NFR BLD AUTO: 66 % (ref 43–75)
NRBC BLD AUTO-RTO: 0 /100 WBCS
PLATELET # BLD AUTO: 192 THOUSANDS/UL (ref 149–390)
PMV BLD AUTO: 10.9 FL (ref 8.9–12.7)
POTASSIUM SERPL-SCNC: 4.4 MMOL/L (ref 3.5–5.3)
PROTHROMBIN TIME: 29.1 SECONDS (ref 11.6–14.5)
RBC # BLD AUTO: 2.67 MILLION/UL (ref 3.81–5.12)
SODIUM SERPL-SCNC: 142 MMOL/L (ref 136–145)
WBC # BLD AUTO: 5.01 THOUSAND/UL (ref 4.31–10.16)

## 2021-11-11 PROCEDURE — 85025 COMPLETE CBC W/AUTO DIFF WBC: CPT | Performed by: INTERNAL MEDICINE

## 2021-11-11 PROCEDURE — 99222 1ST HOSP IP/OBS MODERATE 55: CPT | Performed by: INTERNAL MEDICINE

## 2021-11-11 PROCEDURE — 80048 BASIC METABOLIC PNL TOTAL CA: CPT | Performed by: INTERNAL MEDICINE

## 2021-11-11 PROCEDURE — 5A1D70Z PERFORMANCE OF URINARY FILTRATION, INTERMITTENT, LESS THAN 6 HOURS PER DAY: ICD-10-PCS | Performed by: INTERNAL MEDICINE

## 2021-11-11 PROCEDURE — G0257 UNSCHED DIALYSIS ESRD PT HOS: HCPCS

## 2021-11-11 PROCEDURE — 99214 OFFICE O/P EST MOD 30 MIN: CPT | Performed by: INTERNAL MEDICINE

## 2021-11-11 PROCEDURE — 82948 REAGENT STRIP/BLOOD GLUCOSE: CPT

## 2021-11-11 PROCEDURE — 85610 PROTHROMBIN TIME: CPT | Performed by: INTERNAL MEDICINE

## 2021-11-11 PROCEDURE — 90935 HEMODIALYSIS ONE EVALUATION: CPT | Performed by: INTERNAL MEDICINE

## 2021-11-11 PROCEDURE — 99232 SBSQ HOSP IP/OBS MODERATE 35: CPT | Performed by: INTERNAL MEDICINE

## 2021-11-11 PROCEDURE — NC001 PR NO CHARGE: Performed by: PODIATRIST

## 2021-11-11 RX ORDER — AMMONIUM LACTATE 12 G/100G
CREAM TOPICAL 2 TIMES DAILY PRN
Status: DISCONTINUED | OUTPATIENT
Start: 2021-11-11 | End: 2021-11-11

## 2021-11-11 RX ORDER — METOCLOPRAMIDE HYDROCHLORIDE 5 MG/ML
10 INJECTION INTRAMUSCULAR; INTRAVENOUS EVERY 8 HOURS PRN
Status: DISCONTINUED | OUTPATIENT
Start: 2021-11-11 | End: 2021-11-13 | Stop reason: HOSPADM

## 2021-11-11 RX ORDER — SEVELAMER HYDROCHLORIDE 800 MG/1
1600 TABLET, FILM COATED ORAL
Status: DISCONTINUED | OUTPATIENT
Start: 2021-11-11 | End: 2021-11-13 | Stop reason: HOSPADM

## 2021-11-11 RX ORDER — TORSEMIDE 20 MG/1
100 TABLET ORAL 2 TIMES DAILY
Status: DISCONTINUED | OUTPATIENT
Start: 2021-11-11 | End: 2021-11-12

## 2021-11-11 RX ORDER — PROMETHAZINE HYDROCHLORIDE 25 MG/ML
25 INJECTION, SOLUTION INTRAMUSCULAR; INTRAVENOUS EVERY 6 HOURS PRN
Status: CANCELLED | OUTPATIENT
Start: 2021-11-11

## 2021-11-11 RX ORDER — METOCLOPRAMIDE HYDROCHLORIDE 5 MG/ML
10 INJECTION INTRAMUSCULAR; INTRAVENOUS EVERY 6 HOURS SCHEDULED
Status: CANCELLED | OUTPATIENT
Start: 2021-11-11

## 2021-11-11 RX ORDER — AMMONIUM LACTATE 12 G/100G
CREAM TOPICAL 2 TIMES DAILY
Status: DISCONTINUED | OUTPATIENT
Start: 2021-11-11 | End: 2021-11-13 | Stop reason: HOSPADM

## 2021-11-11 RX ORDER — DOXERCALCIFEROL 2 UG/ML
1 INJECTION, SOLUTION INTRAVENOUS 3 TIMES WEEKLY
Status: DISCONTINUED | OUTPATIENT
Start: 2021-11-11 | End: 2021-11-13 | Stop reason: HOSPADM

## 2021-11-11 RX ADMIN — ONDANSETRON 4 MG: 2 INJECTION INTRAMUSCULAR; INTRAVENOUS at 00:33

## 2021-11-11 RX ADMIN — GABAPENTIN 100 MG: 100 CAPSULE ORAL at 17:12

## 2021-11-11 RX ADMIN — ATORVASTATIN CALCIUM 20 MG: 20 TABLET, FILM COATED ORAL at 17:13

## 2021-11-11 RX ADMIN — DICYCLOMINE HYDROCHLORIDE 10 MG: 10 CAPSULE ORAL at 14:38

## 2021-11-11 RX ADMIN — HYDROXYZINE HYDROCHLORIDE 25 MG: 25 TABLET ORAL at 14:29

## 2021-11-11 RX ADMIN — ONDANSETRON 4 MG: 2 INJECTION INTRAMUSCULAR; INTRAVENOUS at 14:28

## 2021-11-11 RX ADMIN — ONDANSETRON 4 MG: 2 INJECTION INTRAMUSCULAR; INTRAVENOUS at 21:27

## 2021-11-11 RX ADMIN — ATROPINE SULFATE 1 DROP: 10 SOLUTION/ DROPS OPHTHALMIC at 21:30

## 2021-11-11 RX ADMIN — GABAPENTIN 100 MG: 100 CAPSULE ORAL at 21:27

## 2021-11-11 RX ADMIN — NIFEDIPINE 30 MG: 30 TABLET, FILM COATED, EXTENDED RELEASE ORAL at 17:21

## 2021-11-11 RX ADMIN — PANTOPRAZOLE SODIUM 40 MG: 40 TABLET, DELAYED RELEASE ORAL at 06:20

## 2021-11-11 RX ADMIN — DOCUSATE SODIUM 100 MG: 100 CAPSULE ORAL at 17:12

## 2021-11-11 RX ADMIN — NYSTATIN: 100000 POWDER TOPICAL at 17:22

## 2021-11-11 RX ADMIN — INSULIN GLARGINE 20 UNITS: 100 INJECTION, SOLUTION SUBCUTANEOUS at 21:36

## 2021-11-11 RX ADMIN — WARFARIN SODIUM 9 MG: 3 TABLET ORAL at 00:33

## 2021-11-11 RX ADMIN — OXYCODONE HYDROCHLORIDE AND ACETAMINOPHEN 1 TABLET: 5; 325 TABLET ORAL at 18:35

## 2021-11-11 RX ADMIN — HYDROXYZINE HYDROCHLORIDE 25 MG: 25 TABLET ORAL at 07:50

## 2021-11-11 RX ADMIN — GABAPENTIN 100 MG: 100 CAPSULE ORAL at 00:33

## 2021-11-11 RX ADMIN — INSULIN LISPRO 5 UNITS: 100 INJECTION, SOLUTION INTRAVENOUS; SUBCUTANEOUS at 17:19

## 2021-11-11 RX ADMIN — CARVEDILOL 25 MG: 25 TABLET, FILM COATED ORAL at 17:12

## 2021-11-11 RX ADMIN — BRIMONIDINE TARTRATE 1 DROP: 2 SOLUTION OPHTHALMIC at 21:30

## 2021-11-11 RX ADMIN — DOXERCALCIFEROL 1 MCG: 4 INJECTION, SOLUTION INTRAVENOUS at 11:00

## 2021-11-11 RX ADMIN — WARFARIN SODIUM 9 MG: 3 TABLET ORAL at 17:13

## 2021-11-11 RX ADMIN — DICYCLOMINE HYDROCHLORIDE 10 MG: 10 CAPSULE ORAL at 06:21

## 2021-11-11 RX ADMIN — LORATADINE 10 MG: 10 TABLET ORAL at 14:36

## 2021-11-11 RX ADMIN — PREGABALIN 50 MG: 50 CAPSULE ORAL at 14:26

## 2021-11-11 RX ADMIN — SERTRALINE HYDROCHLORIDE 50 MG: 50 TABLET ORAL at 14:29

## 2021-11-11 RX ADMIN — ALPRAZOLAM 0.25 MG: 0.25 TABLET ORAL at 07:50

## 2021-11-11 RX ADMIN — METOCLOPRAMIDE HYDROCHLORIDE 10 MG: 5 INJECTION INTRAMUSCULAR; INTRAVENOUS at 17:11

## 2021-11-11 RX ADMIN — Medication: at 21:31

## 2021-11-11 RX ADMIN — CINACALCET 30 MG: 30 TABLET, FILM COATED ORAL at 14:27

## 2021-11-11 RX ADMIN — PANTOPRAZOLE SODIUM 40 MG: 40 TABLET, DELAYED RELEASE ORAL at 17:13

## 2021-11-11 RX ADMIN — LEVOTHYROXINE SODIUM 50 MCG: 50 TABLET ORAL at 06:21

## 2021-11-11 RX ADMIN — SEVELAMER HYDROCHLORIDE 1600 MG: 800 TABLET, FILM COATED PARENTERAL at 17:13

## 2021-11-11 RX ADMIN — Medication 1 CAPSULE: at 17:12

## 2021-11-11 RX ADMIN — LIDOCAINE 5% 2 PATCH: 700 PATCH TOPICAL at 14:33

## 2021-11-11 RX ADMIN — ONDANSETRON 4 MG: 2 INJECTION INTRAMUSCULAR; INTRAVENOUS at 07:50

## 2021-11-11 RX ADMIN — OXYCODONE HYDROCHLORIDE AND ACETAMINOPHEN 1 TABLET: 5; 325 TABLET ORAL at 12:31

## 2021-11-12 LAB
ANION GAP SERPL CALCULATED.3IONS-SCNC: 8 MMOL/L (ref 4–13)
BUN SERPL-MCNC: 17 MG/DL (ref 5–25)
CALCIUM SERPL-MCNC: 8.4 MG/DL (ref 8.3–10.1)
CHLORIDE SERPL-SCNC: 103 MMOL/L (ref 100–108)
CO2 SERPL-SCNC: 29 MMOL/L (ref 21–32)
CREAT SERPL-MCNC: 4.76 MG/DL (ref 0.6–1.3)
ERYTHROCYTE [DISTWIDTH] IN BLOOD BY AUTOMATED COUNT: 14.3 % (ref 11.6–15.1)
GFR SERPL CREATININE-BSD FRML MDRD: 10 ML/MIN/1.73SQ M
GLUCOSE SERPL-MCNC: 123 MG/DL (ref 65–140)
GLUCOSE SERPL-MCNC: 200 MG/DL (ref 65–140)
GLUCOSE SERPL-MCNC: 255 MG/DL (ref 65–140)
GLUCOSE SERPL-MCNC: 47 MG/DL (ref 65–140)
GLUCOSE SERPL-MCNC: 61 MG/DL (ref 65–140)
GLUCOSE SERPL-MCNC: 80 MG/DL (ref 65–140)
HCT VFR BLD AUTO: 27.9 % (ref 34.8–46.1)
HGB BLD-MCNC: 8.8 G/DL (ref 11.5–15.4)
INR PPP: 3.16 (ref 0.84–1.19)
MCH RBC QN AUTO: 30.9 PG (ref 26.8–34.3)
MCHC RBC AUTO-ENTMCNC: 31.5 G/DL (ref 31.4–37.4)
MCV RBC AUTO: 98 FL (ref 82–98)
PHOSPHATE SERPL-MCNC: 2.8 MG/DL (ref 2.7–4.5)
PLATELET # BLD AUTO: 205 THOUSANDS/UL (ref 149–390)
PMV BLD AUTO: 11.2 FL (ref 8.9–12.7)
POTASSIUM SERPL-SCNC: 4.1 MMOL/L (ref 3.5–5.3)
PROTHROMBIN TIME: 30.8 SECONDS (ref 11.6–14.5)
RBC # BLD AUTO: 2.85 MILLION/UL (ref 3.81–5.12)
SODIUM SERPL-SCNC: 140 MMOL/L (ref 136–145)
TSH SERPL DL<=0.05 MIU/L-ACNC: 1.43 UIU/ML (ref 0.36–3.74)
WBC # BLD AUTO: 6.03 THOUSAND/UL (ref 4.31–10.16)

## 2021-11-12 PROCEDURE — 85610 PROTHROMBIN TIME: CPT | Performed by: INTERNAL MEDICINE

## 2021-11-12 PROCEDURE — 82948 REAGENT STRIP/BLOOD GLUCOSE: CPT

## 2021-11-12 PROCEDURE — 84100 ASSAY OF PHOSPHORUS: CPT | Performed by: NURSE PRACTITIONER

## 2021-11-12 PROCEDURE — 84443 ASSAY THYROID STIM HORMONE: CPT | Performed by: INTERNAL MEDICINE

## 2021-11-12 PROCEDURE — 99232 SBSQ HOSP IP/OBS MODERATE 35: CPT | Performed by: INTERNAL MEDICINE

## 2021-11-12 PROCEDURE — 99024 POSTOP FOLLOW-UP VISIT: CPT | Performed by: PODIATRIST

## 2021-11-12 PROCEDURE — 85027 COMPLETE CBC AUTOMATED: CPT | Performed by: INTERNAL MEDICINE

## 2021-11-12 PROCEDURE — 80048 BASIC METABOLIC PNL TOTAL CA: CPT | Performed by: NURSE PRACTITIONER

## 2021-11-12 RX ORDER — INSULIN GLARGINE 100 [IU]/ML
10 INJECTION, SOLUTION SUBCUTANEOUS
Status: DISCONTINUED | OUTPATIENT
Start: 2021-11-12 | End: 2021-11-13 | Stop reason: HOSPADM

## 2021-11-12 RX ORDER — TRAMADOL HYDROCHLORIDE 50 MG/1
50 TABLET ORAL EVERY 8 HOURS PRN
Status: DISCONTINUED | OUTPATIENT
Start: 2021-11-12 | End: 2021-11-13 | Stop reason: HOSPADM

## 2021-11-12 RX ORDER — TORSEMIDE 20 MG/1
100 TABLET ORAL
Status: DISCONTINUED | OUTPATIENT
Start: 2021-11-14 | End: 2021-11-13 | Stop reason: HOSPADM

## 2021-11-12 RX ORDER — LANOLIN ALCOHOL/MO/W.PET/CERES
3 CREAM (GRAM) TOPICAL
Status: DISCONTINUED | OUTPATIENT
Start: 2021-11-12 | End: 2021-11-13 | Stop reason: HOSPADM

## 2021-11-12 RX ADMIN — LEVOTHYROXINE SODIUM 50 MCG: 50 TABLET ORAL at 06:08

## 2021-11-12 RX ADMIN — METOCLOPRAMIDE HYDROCHLORIDE 10 MG: 5 INJECTION INTRAMUSCULAR; INTRAVENOUS at 20:12

## 2021-11-12 RX ADMIN — HYDROXYZINE HYDROCHLORIDE 25 MG: 25 TABLET ORAL at 17:16

## 2021-11-12 RX ADMIN — PANTOPRAZOLE SODIUM 40 MG: 40 TABLET, DELAYED RELEASE ORAL at 17:15

## 2021-11-12 RX ADMIN — OXYCODONE HYDROCHLORIDE AND ACETAMINOPHEN 1 TABLET: 5; 325 TABLET ORAL at 17:16

## 2021-11-12 RX ADMIN — Medication 1 CAPSULE: at 17:15

## 2021-11-12 RX ADMIN — OXYCODONE HYDROCHLORIDE AND ACETAMINOPHEN 1 TABLET: 5; 325 TABLET ORAL at 09:40

## 2021-11-12 RX ADMIN — WARFARIN SODIUM 8 MG: 5 TABLET ORAL at 17:51

## 2021-11-12 RX ADMIN — PREDNISOLONE ACETATE 1 DROP: 10 SUSPENSION/ DROPS OPHTHALMIC at 21:49

## 2021-11-12 RX ADMIN — ATROPINE SULFATE 1 DROP: 10 SOLUTION/ DROPS OPHTHALMIC at 21:45

## 2021-11-12 RX ADMIN — CALCIUM CARBONATE 1250 MG: 1250 SUSPENSION ORAL at 17:10

## 2021-11-12 RX ADMIN — PREGABALIN 50 MG: 50 CAPSULE ORAL at 09:41

## 2021-11-12 RX ADMIN — SEVELAMER HYDROCHLORIDE 1600 MG: 800 TABLET, FILM COATED PARENTERAL at 11:47

## 2021-11-12 RX ADMIN — CARVEDILOL 25 MG: 25 TABLET, FILM COATED ORAL at 07:31

## 2021-11-12 RX ADMIN — PANTOPRAZOLE SODIUM 40 MG: 40 TABLET, DELAYED RELEASE ORAL at 06:08

## 2021-11-12 RX ADMIN — GABAPENTIN 100 MG: 100 CAPSULE ORAL at 17:15

## 2021-11-12 RX ADMIN — Medication: at 17:18

## 2021-11-12 RX ADMIN — ONDANSETRON 4 MG: 2 INJECTION INTRAMUSCULAR; INTRAVENOUS at 15:35

## 2021-11-12 RX ADMIN — GABAPENTIN 100 MG: 100 CAPSULE ORAL at 21:45

## 2021-11-12 RX ADMIN — CINACALCET 30 MG: 30 TABLET, FILM COATED ORAL at 09:50

## 2021-11-12 RX ADMIN — PREDNISOLONE ACETATE 1 DROP: 10 SUSPENSION/ DROPS OPHTHALMIC at 09:47

## 2021-11-12 RX ADMIN — INSULIN LISPRO 5 UNITS: 100 INJECTION, SOLUTION INTRAVENOUS; SUBCUTANEOUS at 08:32

## 2021-11-12 RX ADMIN — SEVELAMER HYDROCHLORIDE 1600 MG: 800 TABLET, FILM COATED PARENTERAL at 17:15

## 2021-11-12 RX ADMIN — BRIMONIDINE TARTRATE 1 DROP: 2 SOLUTION OPHTHALMIC at 21:45

## 2021-11-12 RX ADMIN — SEVELAMER HYDROCHLORIDE 1600 MG: 800 TABLET, FILM COATED PARENTERAL at 07:34

## 2021-11-12 RX ADMIN — LORATADINE 10 MG: 10 TABLET ORAL at 09:41

## 2021-11-12 RX ADMIN — Medication 3 MG: at 22:47

## 2021-11-12 RX ADMIN — INSULIN LISPRO 1 UNITS: 100 INJECTION, SOLUTION INTRAVENOUS; SUBCUTANEOUS at 17:10

## 2021-11-12 RX ADMIN — PREDNISOLONE ACETATE 1 DROP: 10 SUSPENSION/ DROPS OPHTHALMIC at 11:50

## 2021-11-12 RX ADMIN — CALCIUM CARBONATE 1250 MG: 1250 SUSPENSION ORAL at 11:48

## 2021-11-12 RX ADMIN — TRAMADOL HYDROCHLORIDE 50 MG: 50 TABLET, FILM COATED ORAL at 20:12

## 2021-11-12 RX ADMIN — Medication: at 11:50

## 2021-11-12 RX ADMIN — ATORVASTATIN CALCIUM 20 MG: 20 TABLET, FILM COATED ORAL at 17:15

## 2021-11-12 RX ADMIN — GABAPENTIN 100 MG: 100 CAPSULE ORAL at 09:40

## 2021-11-12 RX ADMIN — CARVEDILOL 25 MG: 25 TABLET, FILM COATED ORAL at 17:15

## 2021-11-12 RX ADMIN — SERTRALINE HYDROCHLORIDE 50 MG: 50 TABLET ORAL at 09:54

## 2021-11-12 RX ADMIN — NIFEDIPINE 30 MG: 30 TABLET, FILM COATED, EXTENDED RELEASE ORAL at 09:48

## 2021-11-12 RX ADMIN — BRIMONIDINE TARTRATE 1 DROP: 2 SOLUTION OPHTHALMIC at 09:46

## 2021-11-12 RX ADMIN — INSULIN GLARGINE 10 UNITS: 100 INJECTION, SOLUTION SUBCUTANEOUS at 21:45

## 2021-11-12 RX ADMIN — CALCIUM CARBONATE 1250 MG: 1250 SUSPENSION ORAL at 07:30

## 2021-11-13 ENCOUNTER — APPOINTMENT (INPATIENT)
Dept: DIALYSIS | Facility: HOSPITAL | Age: 56
DRG: 073 | End: 2021-11-13
Payer: MEDICARE

## 2021-11-13 VITALS
SYSTOLIC BLOOD PRESSURE: 145 MMHG | TEMPERATURE: 97.7 F | DIASTOLIC BLOOD PRESSURE: 118 MMHG | HEART RATE: 118 BPM | RESPIRATION RATE: 18 BRPM | OXYGEN SATURATION: 93 % | WEIGHT: 270 LBS | HEIGHT: 66 IN | BODY MASS INDEX: 43.39 KG/M2

## 2021-11-13 LAB
GLUCOSE SERPL-MCNC: 187 MG/DL (ref 65–140)
GLUCOSE SERPL-MCNC: 207 MG/DL (ref 65–140)
INR PPP: 3.24 (ref 0.84–1.19)
PROTHROMBIN TIME: 31.4 SECONDS (ref 11.6–14.5)

## 2021-11-13 PROCEDURE — 82948 REAGENT STRIP/BLOOD GLUCOSE: CPT

## 2021-11-13 PROCEDURE — 85610 PROTHROMBIN TIME: CPT | Performed by: INTERNAL MEDICINE

## 2021-11-13 PROCEDURE — 5A1D70Z PERFORMANCE OF URINARY FILTRATION, INTERMITTENT, LESS THAN 6 HOURS PER DAY: ICD-10-PCS | Performed by: INTERNAL MEDICINE

## 2021-11-13 PROCEDURE — 99239 HOSP IP/OBS DSCHRG MGMT >30: CPT | Performed by: INTERNAL MEDICINE

## 2021-11-13 PROCEDURE — 99024 POSTOP FOLLOW-UP VISIT: CPT | Performed by: PODIATRIST

## 2021-11-13 PROCEDURE — 90935 HEMODIALYSIS ONE EVALUATION: CPT | Performed by: INTERNAL MEDICINE

## 2021-11-13 RX ORDER — SEVELAMER HYDROCHLORIDE 800 MG/1
1600 TABLET, FILM COATED ORAL
Qty: 30 TABLET | Refills: 0 | Status: SHIPPED | OUTPATIENT
Start: 2021-11-13

## 2021-11-13 RX ORDER — INSULIN GLARGINE 100 [IU]/ML
20 INJECTION, SOLUTION SUBCUTANEOUS
Qty: 15 ML | Refills: 0 | Status: ON HOLD | OUTPATIENT
Start: 2021-11-13 | End: 2022-01-12 | Stop reason: SDUPTHER

## 2021-11-13 RX ORDER — AMMONIUM LACTATE 12 G/100G
CREAM TOPICAL 2 TIMES DAILY
Qty: 385 G | Refills: 0 | Status: SHIPPED | OUTPATIENT
Start: 2021-11-13

## 2021-11-13 RX ORDER — LANOLIN ALCOHOL/MO/W.PET/CERES
3 CREAM (GRAM) TOPICAL
Qty: 30 TABLET | Refills: 0 | Status: SHIPPED | OUTPATIENT
Start: 2021-11-13

## 2021-11-13 RX ORDER — TORSEMIDE 100 MG/1
100 TABLET ORAL
Refills: 0
Start: 2021-11-13

## 2021-11-13 RX ORDER — POLYETHYLENE GLYCOL 3350 17 G/17G
17 POWDER, FOR SOLUTION ORAL DAILY
Qty: 119 G | Refills: 0 | Status: SHIPPED | OUTPATIENT
Start: 2021-11-13 | End: 2022-08-04

## 2021-11-13 RX ADMIN — ALPRAZOLAM 0.25 MG: 0.25 TABLET ORAL at 07:36

## 2021-11-13 RX ADMIN — ONDANSETRON 4 MG: 2 INJECTION INTRAMUSCULAR; INTRAVENOUS at 07:36

## 2021-11-13 RX ADMIN — LEVOTHYROXINE SODIUM 50 MCG: 50 TABLET ORAL at 05:42

## 2021-11-13 RX ADMIN — CINACALCET 30 MG: 30 TABLET, FILM COATED ORAL at 13:22

## 2021-11-13 RX ADMIN — ACETAMINOPHEN 650 MG: 325 TABLET, FILM COATED ORAL at 10:56

## 2021-11-13 RX ADMIN — PREGABALIN 50 MG: 50 CAPSULE ORAL at 13:21

## 2021-11-13 RX ADMIN — LORATADINE 10 MG: 10 TABLET ORAL at 13:21

## 2021-11-13 RX ADMIN — OXYCODONE HYDROCHLORIDE AND ACETAMINOPHEN 1 TABLET: 5; 325 TABLET ORAL at 06:46

## 2021-11-13 RX ADMIN — SEVELAMER HYDROCHLORIDE 1600 MG: 800 TABLET, FILM COATED PARENTERAL at 07:44

## 2021-11-13 RX ADMIN — SEVELAMER HYDROCHLORIDE 1600 MG: 800 TABLET, FILM COATED PARENTERAL at 13:21

## 2021-11-13 RX ADMIN — INSULIN LISPRO 1 UNITS: 100 INJECTION, SOLUTION INTRAVENOUS; SUBCUTANEOUS at 13:22

## 2021-11-13 RX ADMIN — DOXERCALCIFEROL 1 MCG: 4 INJECTION, SOLUTION INTRAVENOUS at 08:49

## 2021-11-13 RX ADMIN — PANTOPRAZOLE SODIUM 40 MG: 40 TABLET, DELAYED RELEASE ORAL at 06:46

## 2021-11-13 RX ADMIN — INSULIN LISPRO 1 UNITS: 100 INJECTION, SOLUTION INTRAVENOUS; SUBCUTANEOUS at 07:36

## 2021-11-14 ENCOUNTER — DOCUMENTATION (OUTPATIENT)
Dept: SOCIAL WORK | Facility: HOSPITAL | Age: 56
End: 2021-11-14

## 2021-11-16 ENCOUNTER — TELEPHONE (OUTPATIENT)
Dept: GASTROENTEROLOGY | Facility: CLINIC | Age: 56
End: 2021-11-16

## 2021-11-16 ENCOUNTER — TELEPHONE (OUTPATIENT)
Dept: PODIATRY | Facility: CLINIC | Age: 56
End: 2021-11-16

## 2021-11-17 ENCOUNTER — TELEPHONE (OUTPATIENT)
Dept: PODIATRY | Facility: CLINIC | Age: 56
End: 2021-11-17

## 2021-11-18 ENCOUNTER — OFFICE VISIT (OUTPATIENT)
Dept: PODIATRY | Facility: CLINIC | Age: 56
End: 2021-11-18
Payer: MEDICARE

## 2021-11-18 VITALS
WEIGHT: 271 LBS | HEIGHT: 66 IN | BODY MASS INDEX: 43.55 KG/M2 | DIASTOLIC BLOOD PRESSURE: 61 MMHG | SYSTOLIC BLOOD PRESSURE: 131 MMHG

## 2021-11-18 DIAGNOSIS — E11.42 DIABETIC POLYNEUROPATHY ASSOCIATED WITH TYPE 2 DIABETES MELLITUS (HCC): Primary | ICD-10-CM

## 2021-11-18 DIAGNOSIS — T81.32XA DISRUPTION OF INTERNAL SURGICAL WOUND, INITIAL ENCOUNTER: ICD-10-CM

## 2021-11-18 PROCEDURE — 11042 DBRDMT SUBQ TIS 1ST 20SQCM/<: CPT | Performed by: PODIATRIST

## 2021-11-21 ENCOUNTER — HOSPITAL ENCOUNTER (INPATIENT)
Facility: HOSPITAL | Age: 56
LOS: 5 days | Discharge: HOME HEALTH | DRG: 073 | End: 2021-11-26
Attending: FAMILY MEDICINE | Admitting: FAMILY MEDICINE
Payer: MEDICARE

## 2021-11-21 ENCOUNTER — APPOINTMENT (EMERGENCY)
Dept: RADIOLOGY | Facility: HOSPITAL | Age: 56
DRG: 073 | End: 2021-11-21
Payer: MEDICARE

## 2021-11-21 DIAGNOSIS — Z99.2 ESRD ON HEMODIALYSIS (HCC): ICD-10-CM

## 2021-11-21 DIAGNOSIS — R11.2 INTRACTABLE VOMITING WITH NAUSEA: Primary | ICD-10-CM

## 2021-11-21 DIAGNOSIS — M79.671 RIGHT FOOT PAIN: ICD-10-CM

## 2021-11-21 DIAGNOSIS — N18.6 ESRD ON HEMODIALYSIS (HCC): ICD-10-CM

## 2021-11-21 LAB
2HR DELTA HS TROPONIN: 0 NG/L
4HR DELTA HS TROPONIN: 1 NG/L
ALBUMIN SERPL BCP-MCNC: 2.7 G/DL (ref 3.5–5)
ALP SERPL-CCNC: 127 U/L (ref 46–116)
ALT SERPL W P-5'-P-CCNC: 21 U/L (ref 12–78)
ANION GAP SERPL CALCULATED.3IONS-SCNC: 6 MMOL/L (ref 4–13)
AST SERPL W P-5'-P-CCNC: 14 U/L (ref 5–45)
ATRIAL RATE: 85 BPM
BASOPHILS # BLD AUTO: 0.03 THOUSANDS/ΜL (ref 0–0.1)
BASOPHILS NFR BLD AUTO: 0 % (ref 0–1)
BILIRUB SERPL-MCNC: 0.43 MG/DL (ref 0.2–1)
BUN SERPL-MCNC: 29 MG/DL (ref 5–25)
CALCIUM ALBUM COR SERPL-MCNC: 9.8 MG/DL (ref 8.3–10.1)
CALCIUM SERPL-MCNC: 8.8 MG/DL (ref 8.3–10.1)
CARDIAC TROPONIN I PNL SERPL HS: 14 NG/L
CARDIAC TROPONIN I PNL SERPL HS: 14 NG/L
CARDIAC TROPONIN I PNL SERPL HS: 15 NG/L
CHLORIDE SERPL-SCNC: 109 MMOL/L (ref 100–108)
CO2 SERPL-SCNC: 26 MMOL/L (ref 21–32)
CREAT SERPL-MCNC: 6.34 MG/DL (ref 0.6–1.3)
EOSINOPHIL # BLD AUTO: 0.04 THOUSAND/ΜL (ref 0–0.61)
EOSINOPHIL NFR BLD AUTO: 0 % (ref 0–6)
ERYTHROCYTE [DISTWIDTH] IN BLOOD BY AUTOMATED COUNT: 14.8 % (ref 11.6–15.1)
GFR SERPL CREATININE-BSD FRML MDRD: 7 ML/MIN/1.73SQ M
GLUCOSE SERPL-MCNC: 160 MG/DL (ref 65–140)
GLUCOSE SERPL-MCNC: 196 MG/DL (ref 65–140)
GLUCOSE SERPL-MCNC: 285 MG/DL (ref 65–140)
HCT VFR BLD AUTO: 28.1 % (ref 34.8–46.1)
HGB BLD-MCNC: 9 G/DL (ref 11.5–15.4)
IMM GRANULOCYTES # BLD AUTO: 0.05 THOUSAND/UL (ref 0–0.2)
IMM GRANULOCYTES NFR BLD AUTO: 0 % (ref 0–2)
INR PPP: 1.9 (ref 0.84–1.19)
LIPASE SERPL-CCNC: 22 U/L (ref 73–393)
LYMPHOCYTES # BLD AUTO: 0.94 THOUSANDS/ΜL (ref 0.6–4.47)
LYMPHOCYTES NFR BLD AUTO: 8 % (ref 14–44)
MAGNESIUM SERPL-MCNC: 2 MG/DL (ref 1.6–2.6)
MCH RBC QN AUTO: 30.7 PG (ref 26.8–34.3)
MCHC RBC AUTO-ENTMCNC: 32 G/DL (ref 31.4–37.4)
MCV RBC AUTO: 96 FL (ref 82–98)
MONOCYTES # BLD AUTO: 0.87 THOUSAND/ΜL (ref 0.17–1.22)
MONOCYTES NFR BLD AUTO: 8 % (ref 4–12)
NEUTROPHILS # BLD AUTO: 9.72 THOUSANDS/ΜL (ref 1.85–7.62)
NEUTS SEG NFR BLD AUTO: 84 % (ref 43–75)
NRBC BLD AUTO-RTO: 0 /100 WBCS
P AXIS: 32 DEGREES
PLATELET # BLD AUTO: 188 THOUSANDS/UL (ref 149–390)
PMV BLD AUTO: 11.1 FL (ref 8.9–12.7)
POTASSIUM SERPL-SCNC: 4.2 MMOL/L (ref 3.5–5.3)
PR INTERVAL: 142 MS
PROT SERPL-MCNC: 6.8 G/DL (ref 6.4–8.2)
PROTHROMBIN TIME: 20.9 SECONDS (ref 11.6–14.5)
QRS AXIS: 58 DEGREES
QRSD INTERVAL: 90 MS
QT INTERVAL: 374 MS
QTC INTERVAL: 445 MS
RBC # BLD AUTO: 2.93 MILLION/UL (ref 3.81–5.12)
SODIUM SERPL-SCNC: 141 MMOL/L (ref 136–145)
T WAVE AXIS: 92 DEGREES
VENTRICULAR RATE: 85 BPM
WBC # BLD AUTO: 11.65 THOUSAND/UL (ref 4.31–10.16)

## 2021-11-21 PROCEDURE — 99285 EMERGENCY DEPT VISIT HI MDM: CPT

## 2021-11-21 PROCEDURE — 99222 1ST HOSP IP/OBS MODERATE 55: CPT | Performed by: INTERNAL MEDICINE

## 2021-11-21 PROCEDURE — 93005 ELECTROCARDIOGRAM TRACING: CPT

## 2021-11-21 PROCEDURE — 82948 REAGENT STRIP/BLOOD GLUCOSE: CPT

## 2021-11-21 PROCEDURE — 84484 ASSAY OF TROPONIN QUANT: CPT | Performed by: PHYSICIAN ASSISTANT

## 2021-11-21 PROCEDURE — 83735 ASSAY OF MAGNESIUM: CPT | Performed by: PHYSICIAN ASSISTANT

## 2021-11-21 PROCEDURE — 85025 COMPLETE CBC W/AUTO DIFF WBC: CPT | Performed by: PHYSICIAN ASSISTANT

## 2021-11-21 PROCEDURE — 99284 EMERGENCY DEPT VISIT MOD MDM: CPT | Performed by: PHYSICIAN ASSISTANT

## 2021-11-21 PROCEDURE — 96361 HYDRATE IV INFUSION ADD-ON: CPT

## 2021-11-21 PROCEDURE — 96374 THER/PROPH/DIAG INJ IV PUSH: CPT

## 2021-11-21 PROCEDURE — 96375 TX/PRO/DX INJ NEW DRUG ADDON: CPT

## 2021-11-21 PROCEDURE — 36415 COLL VENOUS BLD VENIPUNCTURE: CPT | Performed by: PHYSICIAN ASSISTANT

## 2021-11-21 PROCEDURE — 80053 COMPREHEN METABOLIC PANEL: CPT | Performed by: FAMILY MEDICINE

## 2021-11-21 PROCEDURE — 83690 ASSAY OF LIPASE: CPT | Performed by: PHYSICIAN ASSISTANT

## 2021-11-21 PROCEDURE — 84484 ASSAY OF TROPONIN QUANT: CPT | Performed by: FAMILY MEDICINE

## 2021-11-21 PROCEDURE — 99223 1ST HOSP IP/OBS HIGH 75: CPT | Performed by: FAMILY MEDICINE

## 2021-11-21 PROCEDURE — 93010 ELECTROCARDIOGRAM REPORT: CPT | Performed by: INTERNAL MEDICINE

## 2021-11-21 PROCEDURE — 85610 PROTHROMBIN TIME: CPT | Performed by: FAMILY MEDICINE

## 2021-11-21 RX ORDER — LEVOTHYROXINE SODIUM 0.05 MG/1
50 TABLET ORAL DAILY
Status: DISCONTINUED | OUTPATIENT
Start: 2021-11-21 | End: 2021-11-26 | Stop reason: HOSPADM

## 2021-11-21 RX ORDER — INSULIN GLARGINE 100 [IU]/ML
15 INJECTION, SOLUTION SUBCUTANEOUS
Status: DISCONTINUED | OUTPATIENT
Start: 2021-11-21 | End: 2021-11-26 | Stop reason: HOSPADM

## 2021-11-21 RX ORDER — CARVEDILOL 25 MG/1
25 TABLET ORAL 2 TIMES DAILY WITH MEALS
Status: DISCONTINUED | OUTPATIENT
Start: 2021-11-21 | End: 2021-11-26 | Stop reason: HOSPADM

## 2021-11-21 RX ORDER — CHOLECALCIFEROL (VITAMIN D3) 10 MCG
1 TABLET ORAL
Status: DISCONTINUED | OUTPATIENT
Start: 2021-11-21 | End: 2021-11-26 | Stop reason: HOSPADM

## 2021-11-21 RX ORDER — SENNOSIDES 8.6 MG
2 TABLET ORAL
Status: DISCONTINUED | OUTPATIENT
Start: 2021-11-21 | End: 2021-11-26 | Stop reason: HOSPADM

## 2021-11-21 RX ORDER — OXYCODONE HYDROCHLORIDE 5 MG/1
5 TABLET ORAL EVERY 4 HOURS PRN
Status: DISCONTINUED | OUTPATIENT
Start: 2021-11-21 | End: 2021-11-22

## 2021-11-21 RX ORDER — ALBUTEROL SULFATE 90 UG/1
2 AEROSOL, METERED RESPIRATORY (INHALATION) EVERY 6 HOURS PRN
Status: DISCONTINUED | OUTPATIENT
Start: 2021-11-21 | End: 2021-11-26 | Stop reason: HOSPADM

## 2021-11-21 RX ORDER — NIFEDIPINE 30 MG/1
30 TABLET, EXTENDED RELEASE ORAL DAILY
Status: DISCONTINUED | OUTPATIENT
Start: 2021-11-21 | End: 2021-11-26 | Stop reason: HOSPADM

## 2021-11-21 RX ORDER — ACETAMINOPHEN 325 MG/1
650 TABLET ORAL EVERY 6 HOURS PRN
Status: DISCONTINUED | OUTPATIENT
Start: 2021-11-21 | End: 2021-11-26 | Stop reason: HOSPADM

## 2021-11-21 RX ORDER — METOCLOPRAMIDE HYDROCHLORIDE 5 MG/ML
5 INJECTION INTRAMUSCULAR; INTRAVENOUS ONCE
Status: COMPLETED | OUTPATIENT
Start: 2021-11-21 | End: 2021-11-21

## 2021-11-21 RX ORDER — GABAPENTIN 100 MG/1
100 CAPSULE ORAL 3 TIMES DAILY
Status: DISCONTINUED | OUTPATIENT
Start: 2021-11-21 | End: 2021-11-21

## 2021-11-21 RX ORDER — ONDANSETRON 2 MG/ML
4 INJECTION INTRAMUSCULAR; INTRAVENOUS ONCE
Status: COMPLETED | OUTPATIENT
Start: 2021-11-21 | End: 2021-11-21

## 2021-11-21 RX ORDER — METOCLOPRAMIDE HYDROCHLORIDE 5 MG/ML
5 INJECTION INTRAMUSCULAR; INTRAVENOUS EVERY 6 HOURS PRN
Status: DISCONTINUED | OUTPATIENT
Start: 2021-11-21 | End: 2021-11-24

## 2021-11-21 RX ORDER — WARFARIN SODIUM 3 MG/1
9 TABLET ORAL
Status: DISCONTINUED | OUTPATIENT
Start: 2021-11-21 | End: 2021-11-26 | Stop reason: HOSPADM

## 2021-11-21 RX ORDER — HYDROMORPHONE HCL IN WATER/PF 6 MG/30 ML
0.2 PATIENT CONTROLLED ANALGESIA SYRINGE INTRAVENOUS EVERY 4 HOURS PRN
Status: DISCONTINUED | OUTPATIENT
Start: 2021-11-21 | End: 2021-11-26 | Stop reason: HOSPADM

## 2021-11-21 RX ORDER — ATORVASTATIN CALCIUM 20 MG/1
20 TABLET, FILM COATED ORAL EVERY EVENING
Status: DISCONTINUED | OUTPATIENT
Start: 2021-11-21 | End: 2021-11-26 | Stop reason: HOSPADM

## 2021-11-21 RX ORDER — MAGNESIUM HYDROXIDE/ALUMINUM HYDROXICE/SIMETHICONE 120; 1200; 1200 MG/30ML; MG/30ML; MG/30ML
30 SUSPENSION ORAL EVERY 6 HOURS PRN
Status: DISCONTINUED | OUTPATIENT
Start: 2021-11-21 | End: 2021-11-26 | Stop reason: HOSPADM

## 2021-11-21 RX ORDER — SEVELAMER HYDROCHLORIDE 800 MG/1
1600 TABLET, FILM COATED ORAL
Status: DISCONTINUED | OUTPATIENT
Start: 2021-11-21 | End: 2021-11-26 | Stop reason: HOSPADM

## 2021-11-21 RX ORDER — ONDANSETRON 2 MG/ML
4 INJECTION INTRAMUSCULAR; INTRAVENOUS EVERY 6 HOURS PRN
Status: DISCONTINUED | OUTPATIENT
Start: 2021-11-21 | End: 2021-11-24

## 2021-11-21 RX ORDER — KETOROLAC TROMETHAMINE 30 MG/ML
15 INJECTION, SOLUTION INTRAMUSCULAR; INTRAVENOUS ONCE
Status: COMPLETED | OUTPATIENT
Start: 2021-11-21 | End: 2021-11-21

## 2021-11-21 RX ORDER — OXYCODONE HYDROCHLORIDE 5 MG/1
2.5 TABLET ORAL EVERY 4 HOURS PRN
Status: DISCONTINUED | OUTPATIENT
Start: 2021-11-21 | End: 2021-11-22

## 2021-11-21 RX ORDER — METOCLOPRAMIDE HYDROCHLORIDE 5 MG/ML
5 INJECTION INTRAMUSCULAR; INTRAVENOUS EVERY 6 HOURS PRN
Status: DISCONTINUED | OUTPATIENT
Start: 2021-11-21 | End: 2021-11-21

## 2021-11-21 RX ORDER — PANTOPRAZOLE SODIUM 40 MG/1
40 TABLET, DELAYED RELEASE ORAL
Status: DISCONTINUED | OUTPATIENT
Start: 2021-11-21 | End: 2021-11-26 | Stop reason: HOSPADM

## 2021-11-21 RX ORDER — CINACALCET 30 MG/1
30 TABLET, FILM COATED ORAL DAILY
Status: DISCONTINUED | OUTPATIENT
Start: 2021-11-21 | End: 2021-11-26 | Stop reason: HOSPADM

## 2021-11-21 RX ORDER — TORSEMIDE 20 MG/1
100 TABLET ORAL
Status: DISCONTINUED | OUTPATIENT
Start: 2021-11-21 | End: 2021-11-26 | Stop reason: HOSPADM

## 2021-11-21 RX ORDER — LORATADINE 10 MG/1
10 TABLET ORAL DAILY
Status: DISCONTINUED | OUTPATIENT
Start: 2021-11-21 | End: 2021-11-26 | Stop reason: HOSPADM

## 2021-11-21 RX ORDER — ALPRAZOLAM 0.25 MG/1
0.25 TABLET ORAL 2 TIMES DAILY PRN
Status: DISCONTINUED | OUTPATIENT
Start: 2021-11-21 | End: 2021-11-26 | Stop reason: HOSPADM

## 2021-11-21 RX ORDER — LANOLIN ALCOHOL/MO/W.PET/CERES
3 CREAM (GRAM) TOPICAL
Status: DISCONTINUED | OUTPATIENT
Start: 2021-11-21 | End: 2021-11-26 | Stop reason: HOSPADM

## 2021-11-21 RX ADMIN — ATORVASTATIN CALCIUM 20 MG: 20 TABLET, FILM COATED ORAL at 17:09

## 2021-11-21 RX ADMIN — SEVELAMER HYDROCHLORIDE 1600 MG: 800 TABLET, FILM COATED PARENTERAL at 16:42

## 2021-11-21 RX ADMIN — ONDANSETRON 4 MG: 2 INJECTION INTRAMUSCULAR; INTRAVENOUS at 11:13

## 2021-11-21 RX ADMIN — TORSEMIDE 100 MG: 20 TABLET ORAL at 16:42

## 2021-11-21 RX ADMIN — SERTRALINE HYDROCHLORIDE 50 MG: 50 TABLET ORAL at 16:43

## 2021-11-21 RX ADMIN — PANTOPRAZOLE SODIUM 40 MG: 40 TABLET, DELAYED RELEASE ORAL at 16:43

## 2021-11-21 RX ADMIN — OXYCODONE HYDROCHLORIDE 5 MG: 5 TABLET ORAL at 17:19

## 2021-11-21 RX ADMIN — WARFARIN SODIUM 9 MG: 3 TABLET ORAL at 18:29

## 2021-11-21 RX ADMIN — MELATONIN TAB 3 MG 3 MG: 3 TAB at 21:02

## 2021-11-21 RX ADMIN — NIFEDIPINE 30 MG: 30 TABLET, FILM COATED, EXTENDED RELEASE ORAL at 18:29

## 2021-11-21 RX ADMIN — KETOROLAC TROMETHAMINE 15 MG: 30 INJECTION, SOLUTION INTRAMUSCULAR at 12:21

## 2021-11-21 RX ADMIN — NEPHROCAP 1 CAPSULE: 1 CAP ORAL at 18:29

## 2021-11-21 RX ADMIN — METOCLOPRAMIDE HYDROCHLORIDE 5 MG: 5 INJECTION INTRAMUSCULAR; INTRAVENOUS at 22:42

## 2021-11-21 RX ADMIN — INSULIN LISPRO 2 UNITS: 100 INJECTION, SOLUTION INTRAVENOUS; SUBCUTANEOUS at 21:02

## 2021-11-21 RX ADMIN — ONDANSETRON 4 MG: 2 INJECTION INTRAMUSCULAR; INTRAVENOUS at 16:47

## 2021-11-21 RX ADMIN — CINACALCET 30 MG: 30 TABLET, FILM COATED ORAL at 18:29

## 2021-11-21 RX ADMIN — HYDROMORPHONE HYDROCHLORIDE 0.2 MG: 0.2 INJECTION, SOLUTION INTRAMUSCULAR; INTRAVENOUS; SUBCUTANEOUS at 20:48

## 2021-11-21 RX ADMIN — METOCLOPRAMIDE HYDROCHLORIDE 5 MG: 5 INJECTION INTRAMUSCULAR; INTRAVENOUS at 11:13

## 2021-11-21 RX ADMIN — INSULIN GLARGINE 15 UNITS: 100 INJECTION, SOLUTION SUBCUTANEOUS at 21:02

## 2021-11-21 RX ADMIN — SODIUM CHLORIDE 500 ML: 0.9 INJECTION, SOLUTION INTRAVENOUS at 11:13

## 2021-11-21 RX ADMIN — ALPRAZOLAM 0.25 MG: 0.25 TABLET ORAL at 21:02

## 2021-11-21 RX ADMIN — INSULIN LISPRO 1 UNITS: 100 INJECTION, SOLUTION INTRAVENOUS; SUBCUTANEOUS at 16:57

## 2021-11-21 RX ADMIN — LORATADINE 10 MG: 10 TABLET ORAL at 16:43

## 2021-11-21 RX ADMIN — ONDANSETRON 4 MG: 2 INJECTION INTRAMUSCULAR; INTRAVENOUS at 20:48

## 2021-11-21 RX ADMIN — CARVEDILOL 25 MG: 25 TABLET, FILM COATED ORAL at 16:43

## 2021-11-21 RX ADMIN — OXYCODONE HYDROCHLORIDE 5 MG: 5 TABLET ORAL at 22:49

## 2021-11-22 ENCOUNTER — APPOINTMENT (INPATIENT)
Dept: DIALYSIS | Facility: HOSPITAL | Age: 56
DRG: 073 | End: 2021-11-22
Payer: MEDICARE

## 2021-11-22 ENCOUNTER — TELEPHONE (OUTPATIENT)
Dept: GASTROENTEROLOGY | Facility: CLINIC | Age: 56
End: 2021-11-22

## 2021-11-22 LAB
ALBUMIN SERPL BCP-MCNC: 2.3 G/DL (ref 3.5–5)
ALP SERPL-CCNC: 112 U/L (ref 46–116)
ALT SERPL W P-5'-P-CCNC: 10 U/L (ref 12–78)
ANION GAP SERPL CALCULATED.3IONS-SCNC: 8 MMOL/L (ref 4–13)
AST SERPL W P-5'-P-CCNC: 13 U/L (ref 5–45)
BASOPHILS # BLD AUTO: 0.03 THOUSANDS/ΜL (ref 0–0.1)
BASOPHILS NFR BLD AUTO: 0 % (ref 0–1)
BILIRUB SERPL-MCNC: 0.4 MG/DL (ref 0.2–1)
BUN SERPL-MCNC: 38 MG/DL (ref 5–25)
CALCIUM ALBUM COR SERPL-MCNC: 9.8 MG/DL (ref 8.3–10.1)
CALCIUM SERPL-MCNC: 8.4 MG/DL (ref 8.3–10.1)
CHLORIDE SERPL-SCNC: 108 MMOL/L (ref 100–108)
CO2 SERPL-SCNC: 24 MMOL/L (ref 21–32)
CREAT SERPL-MCNC: 7.79 MG/DL (ref 0.6–1.3)
EOSINOPHIL # BLD AUTO: 0.12 THOUSAND/ΜL (ref 0–0.61)
EOSINOPHIL NFR BLD AUTO: 1 % (ref 0–6)
ERYTHROCYTE [DISTWIDTH] IN BLOOD BY AUTOMATED COUNT: 15.1 % (ref 11.6–15.1)
GFR SERPL CREATININE-BSD FRML MDRD: 5 ML/MIN/1.73SQ M
GLUCOSE SERPL-MCNC: 111 MG/DL (ref 65–140)
GLUCOSE SERPL-MCNC: 136 MG/DL (ref 65–140)
GLUCOSE SERPL-MCNC: 153 MG/DL (ref 65–140)
GLUCOSE SERPL-MCNC: 281 MG/DL (ref 65–140)
GLUCOSE SERPL-MCNC: 287 MG/DL (ref 65–140)
HCT VFR BLD AUTO: 25 % (ref 34.8–46.1)
HGB BLD-MCNC: 7.9 G/DL (ref 11.5–15.4)
IMM GRANULOCYTES # BLD AUTO: 0.05 THOUSAND/UL (ref 0–0.2)
IMM GRANULOCYTES NFR BLD AUTO: 1 % (ref 0–2)
INR PPP: 1.85 (ref 0.84–1.19)
LYMPHOCYTES # BLD AUTO: 0.98 THOUSANDS/ΜL (ref 0.6–4.47)
LYMPHOCYTES NFR BLD AUTO: 10 % (ref 14–44)
MAGNESIUM SERPL-MCNC: 2.1 MG/DL (ref 1.6–2.6)
MCH RBC QN AUTO: 30.7 PG (ref 26.8–34.3)
MCHC RBC AUTO-ENTMCNC: 31.6 G/DL (ref 31.4–37.4)
MCV RBC AUTO: 97 FL (ref 82–98)
MONOCYTES # BLD AUTO: 0.81 THOUSAND/ΜL (ref 0.17–1.22)
MONOCYTES NFR BLD AUTO: 8 % (ref 4–12)
NEUTROPHILS # BLD AUTO: 8.26 THOUSANDS/ΜL (ref 1.85–7.62)
NEUTS SEG NFR BLD AUTO: 80 % (ref 43–75)
NRBC BLD AUTO-RTO: 0 /100 WBCS
PLATELET # BLD AUTO: 157 THOUSANDS/UL (ref 149–390)
PMV BLD AUTO: 11.7 FL (ref 8.9–12.7)
POTASSIUM SERPL-SCNC: 4.2 MMOL/L (ref 3.5–5.3)
PROT SERPL-MCNC: 6 G/DL (ref 6.4–8.2)
PROTHROMBIN TIME: 20.5 SECONDS (ref 11.6–14.5)
RBC # BLD AUTO: 2.57 MILLION/UL (ref 3.81–5.12)
SODIUM SERPL-SCNC: 140 MMOL/L (ref 136–145)
WBC # BLD AUTO: 10.25 THOUSAND/UL (ref 4.31–10.16)

## 2021-11-22 PROCEDURE — 85025 COMPLETE CBC W/AUTO DIFF WBC: CPT | Performed by: FAMILY MEDICINE

## 2021-11-22 PROCEDURE — 5A1D70Z PERFORMANCE OF URINARY FILTRATION, INTERMITTENT, LESS THAN 6 HOURS PER DAY: ICD-10-PCS | Performed by: INTERNAL MEDICINE

## 2021-11-22 PROCEDURE — 99232 SBSQ HOSP IP/OBS MODERATE 35: CPT | Performed by: HOSPITALIST

## 2021-11-22 PROCEDURE — 99223 1ST HOSP IP/OBS HIGH 75: CPT | Performed by: INTERNAL MEDICINE

## 2021-11-22 PROCEDURE — 85610 PROTHROMBIN TIME: CPT | Performed by: FAMILY MEDICINE

## 2021-11-22 PROCEDURE — 83735 ASSAY OF MAGNESIUM: CPT | Performed by: FAMILY MEDICINE

## 2021-11-22 PROCEDURE — 82948 REAGENT STRIP/BLOOD GLUCOSE: CPT

## 2021-11-22 PROCEDURE — 80053 COMPREHEN METABOLIC PANEL: CPT | Performed by: FAMILY MEDICINE

## 2021-11-22 PROCEDURE — 99232 SBSQ HOSP IP/OBS MODERATE 35: CPT | Performed by: INTERNAL MEDICINE

## 2021-11-22 RX ORDER — OXYCODONE HYDROCHLORIDE AND ACETAMINOPHEN 5; 325 MG/1; MG/1
2 TABLET ORAL EVERY 6 HOURS PRN
Status: DISCONTINUED | OUTPATIENT
Start: 2021-11-22 | End: 2021-11-26 | Stop reason: HOSPADM

## 2021-11-22 RX ORDER — DIPHENHYDRAMINE HCL 25 MG
12.5 TABLET ORAL ONCE
Status: COMPLETED | OUTPATIENT
Start: 2021-11-22 | End: 2021-11-22

## 2021-11-22 RX ORDER — AMMONIUM LACTATE 12 G/100G
CREAM TOPICAL 2 TIMES DAILY PRN
Status: DISCONTINUED | OUTPATIENT
Start: 2021-11-22 | End: 2021-11-26 | Stop reason: HOSPADM

## 2021-11-22 RX ADMIN — CINACALCET 30 MG: 30 TABLET, FILM COATED ORAL at 09:07

## 2021-11-22 RX ADMIN — NEPHROCAP 1 CAPSULE: 1 CAP ORAL at 17:04

## 2021-11-22 RX ADMIN — LORATADINE 10 MG: 10 TABLET ORAL at 09:06

## 2021-11-22 RX ADMIN — SERTRALINE HYDROCHLORIDE 50 MG: 50 TABLET ORAL at 09:04

## 2021-11-22 RX ADMIN — ALPRAZOLAM 0.25 MG: 0.25 TABLET ORAL at 12:13

## 2021-11-22 RX ADMIN — ATORVASTATIN CALCIUM 20 MG: 20 TABLET, FILM COATED ORAL at 17:03

## 2021-11-22 RX ADMIN — ONDANSETRON 4 MG: 2 INJECTION INTRAMUSCULAR; INTRAVENOUS at 06:02

## 2021-11-22 RX ADMIN — HYDROMORPHONE HYDROCHLORIDE 0.2 MG: 0.2 INJECTION, SOLUTION INTRAMUSCULAR; INTRAVENOUS; SUBCUTANEOUS at 09:06

## 2021-11-22 RX ADMIN — Medication: at 16:59

## 2021-11-22 RX ADMIN — METOCLOPRAMIDE HYDROCHLORIDE 5 MG: 5 INJECTION INTRAMUSCULAR; INTRAVENOUS at 09:06

## 2021-11-22 RX ADMIN — WARFARIN SODIUM 9 MG: 3 TABLET ORAL at 17:07

## 2021-11-22 RX ADMIN — INSULIN LISPRO 2 UNITS: 100 INJECTION, SOLUTION INTRAVENOUS; SUBCUTANEOUS at 22:05

## 2021-11-22 RX ADMIN — PANTOPRAZOLE SODIUM 40 MG: 40 TABLET, DELAYED RELEASE ORAL at 16:55

## 2021-11-22 RX ADMIN — MELATONIN TAB 3 MG 3 MG: 3 TAB at 22:06

## 2021-11-22 RX ADMIN — INSULIN GLARGINE 15 UNITS: 100 INJECTION, SOLUTION SUBCUTANEOUS at 22:05

## 2021-11-22 RX ADMIN — LEVOTHYROXINE SODIUM 50 MCG: 50 TABLET ORAL at 09:04

## 2021-11-22 RX ADMIN — SEVELAMER HYDROCHLORIDE 1600 MG: 800 TABLET, FILM COATED PARENTERAL at 16:55

## 2021-11-22 RX ADMIN — DIPHENHYDRAMINE HCL 12.5 MG: 25 TABLET, COATED ORAL at 12:56

## 2021-11-22 RX ADMIN — OXYCODONE HYDROCHLORIDE 5 MG: 5 TABLET ORAL at 17:09

## 2021-11-22 RX ADMIN — OXYCODONE HYDROCHLORIDE 5 MG: 5 TABLET ORAL at 12:12

## 2021-11-22 RX ADMIN — CARVEDILOL 25 MG: 25 TABLET, FILM COATED ORAL at 16:55

## 2021-11-22 RX ADMIN — OXYCODONE HYDROCHLORIDE 5 MG: 5 TABLET ORAL at 06:02

## 2021-11-22 RX ADMIN — HYDROMORPHONE HYDROCHLORIDE 0.2 MG: 0.2 INJECTION, SOLUTION INTRAMUSCULAR; INTRAVENOUS; SUBCUTANEOUS at 16:55

## 2021-11-22 RX ADMIN — SEVELAMER HYDROCHLORIDE 1600 MG: 800 TABLET, FILM COATED PARENTERAL at 09:04

## 2021-11-22 RX ADMIN — NIFEDIPINE 30 MG: 30 TABLET, FILM COATED, EXTENDED RELEASE ORAL at 17:05

## 2021-11-22 RX ADMIN — PANTOPRAZOLE SODIUM 40 MG: 40 TABLET, DELAYED RELEASE ORAL at 06:02

## 2021-11-23 ENCOUNTER — ANESTHESIA EVENT (INPATIENT)
Dept: GASTROENTEROLOGY | Facility: HOSPITAL | Age: 56
DRG: 073 | End: 2021-11-23
Payer: MEDICARE

## 2021-11-23 ENCOUNTER — APPOINTMENT (INPATIENT)
Dept: GASTROENTEROLOGY | Facility: HOSPITAL | Age: 56
DRG: 073 | End: 2021-11-23
Payer: MEDICARE

## 2021-11-23 ENCOUNTER — ANESTHESIA (INPATIENT)
Dept: GASTROENTEROLOGY | Facility: HOSPITAL | Age: 56
DRG: 073 | End: 2021-11-23
Payer: MEDICARE

## 2021-11-23 LAB
ANION GAP SERPL CALCULATED.3IONS-SCNC: 5 MMOL/L (ref 4–13)
BASOPHILS # BLD AUTO: 0.03 THOUSANDS/ΜL (ref 0–0.1)
BASOPHILS NFR BLD AUTO: 0 % (ref 0–1)
BUN SERPL-MCNC: 26 MG/DL (ref 5–25)
CALCIUM SERPL-MCNC: 8.4 MG/DL (ref 8.3–10.1)
CHLORIDE SERPL-SCNC: 101 MMOL/L (ref 100–108)
CO2 SERPL-SCNC: 30 MMOL/L (ref 21–32)
CREAT SERPL-MCNC: 5.94 MG/DL (ref 0.6–1.3)
EOSINOPHIL # BLD AUTO: 0.26 THOUSAND/ΜL (ref 0–0.61)
EOSINOPHIL NFR BLD AUTO: 3 % (ref 0–6)
ERYTHROCYTE [DISTWIDTH] IN BLOOD BY AUTOMATED COUNT: 15.4 % (ref 11.6–15.1)
GFR SERPL CREATININE-BSD FRML MDRD: 7 ML/MIN/1.73SQ M
GLUCOSE SERPL-MCNC: 111 MG/DL (ref 65–140)
GLUCOSE SERPL-MCNC: 169 MG/DL (ref 65–140)
GLUCOSE SERPL-MCNC: 206 MG/DL (ref 65–140)
GLUCOSE SERPL-MCNC: 222 MG/DL (ref 65–140)
GLUCOSE SERPL-MCNC: 262 MG/DL (ref 65–140)
HCT VFR BLD AUTO: 26.5 % (ref 34.8–46.1)
HGB BLD-MCNC: 8.5 G/DL (ref 11.5–15.4)
IMM GRANULOCYTES # BLD AUTO: 0.08 THOUSAND/UL (ref 0–0.2)
IMM GRANULOCYTES NFR BLD AUTO: 1 % (ref 0–2)
INR PPP: 2.02 (ref 0.84–1.19)
LACTATE SERPL-SCNC: 0.8 MMOL/L (ref 0.5–2)
LYMPHOCYTES # BLD AUTO: 1.2 THOUSANDS/ΜL (ref 0.6–4.47)
LYMPHOCYTES NFR BLD AUTO: 13 % (ref 14–44)
MAGNESIUM SERPL-MCNC: 2 MG/DL (ref 1.6–2.6)
MCH RBC QN AUTO: 30.8 PG (ref 26.8–34.3)
MCHC RBC AUTO-ENTMCNC: 32.1 G/DL (ref 31.4–37.4)
MCV RBC AUTO: 96 FL (ref 82–98)
MONOCYTES # BLD AUTO: 0.77 THOUSAND/ΜL (ref 0.17–1.22)
MONOCYTES NFR BLD AUTO: 8 % (ref 4–12)
MYCOBACTERIUM SPEC CULT: NORMAL
NEUTROPHILS # BLD AUTO: 7.08 THOUSANDS/ΜL (ref 1.85–7.62)
NEUTS SEG NFR BLD AUTO: 75 % (ref 43–75)
NRBC BLD AUTO-RTO: 0 /100 WBCS
PHOSPHATE SERPL-MCNC: 2.6 MG/DL (ref 2.7–4.5)
PLATELET # BLD AUTO: 162 THOUSANDS/UL (ref 149–390)
PMV BLD AUTO: 11.5 FL (ref 8.9–12.7)
POTASSIUM SERPL-SCNC: 3.9 MMOL/L (ref 3.5–5.3)
PROTHROMBIN TIME: 21.9 SECONDS (ref 11.6–14.5)
RBC # BLD AUTO: 2.76 MILLION/UL (ref 3.81–5.12)
RHODAMINE-AURAMINE STN SPEC: NORMAL
SODIUM SERPL-SCNC: 136 MMOL/L (ref 136–145)
WBC # BLD AUTO: 9.42 THOUSAND/UL (ref 4.31–10.16)

## 2021-11-23 PROCEDURE — NC001 PR NO CHARGE: Performed by: PODIATRIST

## 2021-11-23 PROCEDURE — 99232 SBSQ HOSP IP/OBS MODERATE 35: CPT | Performed by: INTERNAL MEDICINE

## 2021-11-23 PROCEDURE — 43251 EGD REMOVE LESION SNARE: CPT | Performed by: INTERNAL MEDICINE

## 2021-11-23 PROCEDURE — 43239 EGD BIOPSY SINGLE/MULTIPLE: CPT | Performed by: INTERNAL MEDICINE

## 2021-11-23 PROCEDURE — 88305 TISSUE EXAM BY PATHOLOGIST: CPT | Performed by: PATHOLOGY

## 2021-11-23 PROCEDURE — 0DB98ZX EXCISION OF DUODENUM, VIA NATURAL OR ARTIFICIAL OPENING ENDOSCOPIC, DIAGNOSTIC: ICD-10-PCS | Performed by: INTERNAL MEDICINE

## 2021-11-23 PROCEDURE — 0DB68ZX EXCISION OF STOMACH, VIA NATURAL OR ARTIFICIAL OPENING ENDOSCOPIC, DIAGNOSTIC: ICD-10-PCS | Performed by: INTERNAL MEDICINE

## 2021-11-23 PROCEDURE — 82948 REAGENT STRIP/BLOOD GLUCOSE: CPT

## 2021-11-23 PROCEDURE — 84100 ASSAY OF PHOSPHORUS: CPT | Performed by: HOSPITALIST

## 2021-11-23 PROCEDURE — 85610 PROTHROMBIN TIME: CPT | Performed by: HOSPITALIST

## 2021-11-23 PROCEDURE — 80048 BASIC METABOLIC PNL TOTAL CA: CPT | Performed by: INTERNAL MEDICINE

## 2021-11-23 PROCEDURE — 0DB78ZX EXCISION OF STOMACH, PYLORUS, VIA NATURAL OR ARTIFICIAL OPENING ENDOSCOPIC, DIAGNOSTIC: ICD-10-PCS | Performed by: INTERNAL MEDICINE

## 2021-11-23 PROCEDURE — 5A1D70Z PERFORMANCE OF URINARY FILTRATION, INTERMITTENT, LESS THAN 6 HOURS PER DAY: ICD-10-PCS | Performed by: INTERNAL MEDICINE

## 2021-11-23 PROCEDURE — 83605 ASSAY OF LACTIC ACID: CPT | Performed by: INTERNAL MEDICINE

## 2021-11-23 PROCEDURE — 83735 ASSAY OF MAGNESIUM: CPT | Performed by: HOSPITALIST

## 2021-11-23 PROCEDURE — 85025 COMPLETE CBC W/AUTO DIFF WBC: CPT | Performed by: HOSPITALIST

## 2021-11-23 RX ORDER — KETAMINE HYDROCHLORIDE 50 MG/ML
INJECTION, SOLUTION, CONCENTRATE INTRAMUSCULAR; INTRAVENOUS AS NEEDED
Status: DISCONTINUED | OUTPATIENT
Start: 2021-11-23 | End: 2021-11-23

## 2021-11-23 RX ORDER — SODIUM CHLORIDE 9 MG/ML
INJECTION, SOLUTION INTRAVENOUS CONTINUOUS PRN
Status: DISCONTINUED | OUTPATIENT
Start: 2021-11-23 | End: 2021-11-23

## 2021-11-23 RX ORDER — PROPOFOL 10 MG/ML
INJECTION, EMULSION INTRAVENOUS AS NEEDED
Status: DISCONTINUED | OUTPATIENT
Start: 2021-11-23 | End: 2021-11-23

## 2021-11-23 RX ORDER — LIDOCAINE 50 MG/G
2 PATCH TOPICAL DAILY
Status: DISCONTINUED | OUTPATIENT
Start: 2021-11-23 | End: 2021-11-26 | Stop reason: HOSPADM

## 2021-11-23 RX ADMIN — PROPOFOL 50 MG: 10 INJECTION, EMULSION INTRAVENOUS at 12:40

## 2021-11-23 RX ADMIN — LEVOTHYROXINE SODIUM 50 MCG: 50 TABLET ORAL at 08:48

## 2021-11-23 RX ADMIN — INSULIN GLARGINE 15 UNITS: 100 INJECTION, SOLUTION SUBCUTANEOUS at 21:46

## 2021-11-23 RX ADMIN — INSULIN LISPRO 1 UNITS: 100 INJECTION, SOLUTION INTRAVENOUS; SUBCUTANEOUS at 21:46

## 2021-11-23 RX ADMIN — CARVEDILOL 25 MG: 25 TABLET, FILM COATED ORAL at 17:04

## 2021-11-23 RX ADMIN — LORATADINE 10 MG: 10 TABLET ORAL at 08:48

## 2021-11-23 RX ADMIN — MELATONIN TAB 3 MG 3 MG: 3 TAB at 21:46

## 2021-11-23 RX ADMIN — PROPOFOL 40 MG: 10 INJECTION, EMULSION INTRAVENOUS at 12:37

## 2021-11-23 RX ADMIN — CINACALCET 30 MG: 30 TABLET, FILM COATED ORAL at 17:21

## 2021-11-23 RX ADMIN — PANTOPRAZOLE SODIUM 40 MG: 40 TABLET, DELAYED RELEASE ORAL at 17:04

## 2021-11-23 RX ADMIN — LIDOCAINE HYDROCHLORIDE 100 MG: 20 INJECTION, SOLUTION INTRAVENOUS at 12:34

## 2021-11-23 RX ADMIN — SODIUM CHLORIDE: 9 INJECTION, SOLUTION INTRAVENOUS at 12:30

## 2021-11-23 RX ADMIN — SERTRALINE HYDROCHLORIDE 50 MG: 50 TABLET ORAL at 08:48

## 2021-11-23 RX ADMIN — INSULIN LISPRO 2 UNITS: 100 INJECTION, SOLUTION INTRAVENOUS; SUBCUTANEOUS at 17:21

## 2021-11-23 RX ADMIN — METOCLOPRAMIDE HYDROCHLORIDE 5 MG: 5 INJECTION INTRAMUSCULAR; INTRAVENOUS at 17:04

## 2021-11-23 RX ADMIN — SEVELAMER HYDROCHLORIDE 1600 MG: 800 TABLET, FILM COATED PARENTERAL at 08:48

## 2021-11-23 RX ADMIN — WARFARIN SODIUM 9 MG: 3 TABLET ORAL at 17:22

## 2021-11-23 RX ADMIN — PANTOPRAZOLE SODIUM 40 MG: 40 TABLET, DELAYED RELEASE ORAL at 06:01

## 2021-11-23 RX ADMIN — TORSEMIDE 100 MG: 20 TABLET ORAL at 17:04

## 2021-11-23 RX ADMIN — PROPOFOL 110 MG: 10 INJECTION, EMULSION INTRAVENOUS at 12:34

## 2021-11-23 RX ADMIN — ATORVASTATIN CALCIUM 20 MG: 20 TABLET, FILM COATED ORAL at 17:04

## 2021-11-23 RX ADMIN — NEPHROCAP 1 CAPSULE: 1 CAP ORAL at 17:23

## 2021-11-23 RX ADMIN — OXYCODONE HYDROCHLORIDE AND ACETAMINOPHEN 2 TABLET: 5; 325 TABLET ORAL at 17:20

## 2021-11-23 RX ADMIN — LIDOCAINE 5% 2 PATCH: 700 PATCH TOPICAL at 21:54

## 2021-11-23 RX ADMIN — PROPOFOL 30 MG: 10 INJECTION, EMULSION INTRAVENOUS at 12:43

## 2021-11-23 RX ADMIN — KETAMINE HYDROCHLORIDE 10 MG: 50 INJECTION, SOLUTION INTRAMUSCULAR; INTRAVENOUS at 12:34

## 2021-11-23 RX ADMIN — SEVELAMER HYDROCHLORIDE 1600 MG: 800 TABLET, FILM COATED PARENTERAL at 17:04

## 2021-11-23 RX ADMIN — ALPRAZOLAM 0.25 MG: 0.25 TABLET ORAL at 10:23

## 2021-11-24 ENCOUNTER — APPOINTMENT (INPATIENT)
Dept: DIALYSIS | Facility: HOSPITAL | Age: 56
DRG: 073 | End: 2021-11-24
Attending: INTERNAL MEDICINE
Payer: MEDICARE

## 2021-11-24 PROBLEM — T81.32XA DISRUPTION OF INTERNAL SURGICAL WOUND: Status: ACTIVE | Noted: 2019-08-26

## 2021-11-24 LAB
ANION GAP SERPL CALCULATED.3IONS-SCNC: 6 MMOL/L (ref 4–13)
BUN SERPL-MCNC: 42 MG/DL (ref 5–25)
CALCIUM SERPL-MCNC: 8.3 MG/DL (ref 8.3–10.1)
CHLORIDE SERPL-SCNC: 104 MMOL/L (ref 100–108)
CO2 SERPL-SCNC: 26 MMOL/L (ref 21–32)
CREAT SERPL-MCNC: 7.81 MG/DL (ref 0.6–1.3)
ERYTHROCYTE [DISTWIDTH] IN BLOOD BY AUTOMATED COUNT: 14.9 % (ref 11.6–15.1)
GFR SERPL CREATININE-BSD FRML MDRD: 5 ML/MIN/1.73SQ M
GLUCOSE SERPL-MCNC: 148 MG/DL (ref 65–140)
GLUCOSE SERPL-MCNC: 150 MG/DL (ref 65–140)
GLUCOSE SERPL-MCNC: 161 MG/DL (ref 65–140)
GLUCOSE SERPL-MCNC: 188 MG/DL (ref 65–140)
GLUCOSE SERPL-MCNC: 213 MG/DL (ref 65–140)
HCT VFR BLD AUTO: 24.4 % (ref 34.8–46.1)
HGB BLD-MCNC: 7.7 G/DL (ref 11.5–15.4)
MCH RBC QN AUTO: 30.8 PG (ref 26.8–34.3)
MCHC RBC AUTO-ENTMCNC: 31.6 G/DL (ref 31.4–37.4)
MCV RBC AUTO: 98 FL (ref 82–98)
PHOSPHATE SERPL-MCNC: 3.6 MG/DL (ref 2.7–4.5)
PLATELET # BLD AUTO: 147 THOUSANDS/UL (ref 149–390)
PMV BLD AUTO: 11.7 FL (ref 8.9–12.7)
POTASSIUM SERPL-SCNC: 4.2 MMOL/L (ref 3.5–5.3)
RBC # BLD AUTO: 2.5 MILLION/UL (ref 3.81–5.12)
SODIUM SERPL-SCNC: 136 MMOL/L (ref 136–145)
WBC # BLD AUTO: 7.77 THOUSAND/UL (ref 4.31–10.16)

## 2021-11-24 PROCEDURE — 84100 ASSAY OF PHOSPHORUS: CPT | Performed by: INTERNAL MEDICINE

## 2021-11-24 PROCEDURE — 99232 SBSQ HOSP IP/OBS MODERATE 35: CPT | Performed by: INTERNAL MEDICINE

## 2021-11-24 PROCEDURE — 85027 COMPLETE CBC AUTOMATED: CPT | Performed by: INTERNAL MEDICINE

## 2021-11-24 PROCEDURE — 82948 REAGENT STRIP/BLOOD GLUCOSE: CPT

## 2021-11-24 PROCEDURE — 80048 BASIC METABOLIC PNL TOTAL CA: CPT | Performed by: INTERNAL MEDICINE

## 2021-11-24 RX ORDER — METOCLOPRAMIDE HYDROCHLORIDE 5 MG/5ML
5 SOLUTION ORAL EVERY 6 HOURS PRN
Status: DISCONTINUED | OUTPATIENT
Start: 2021-11-24 | End: 2021-11-25

## 2021-11-24 RX ORDER — DIPHENHYDRAMINE HCL 25 MG
12.5 TABLET ORAL EVERY 6 HOURS PRN
Status: DISCONTINUED | OUTPATIENT
Start: 2021-11-24 | End: 2021-11-25

## 2021-11-24 RX ORDER — ONDANSETRON 4 MG/1
4 TABLET, ORALLY DISINTEGRATING ORAL EVERY 6 HOURS PRN
Status: DISCONTINUED | OUTPATIENT
Start: 2021-11-24 | End: 2021-11-25

## 2021-11-24 RX ORDER — PROMETHAZINE HYDROCHLORIDE 12.5 MG/1
12.5 SUPPOSITORY RECTAL EVERY 6 HOURS PRN
Status: DISCONTINUED | OUTPATIENT
Start: 2021-11-24 | End: 2021-11-26 | Stop reason: HOSPADM

## 2021-11-24 RX ADMIN — INSULIN LISPRO 1 UNITS: 100 INJECTION, SOLUTION INTRAVENOUS; SUBCUTANEOUS at 07:05

## 2021-11-24 RX ADMIN — ATORVASTATIN CALCIUM 20 MG: 20 TABLET, FILM COATED ORAL at 17:08

## 2021-11-24 RX ADMIN — DIPHENHYDRAMINE HCL 12.5 MG: 25 TABLET ORAL at 23:40

## 2021-11-24 RX ADMIN — SERTRALINE HYDROCHLORIDE 50 MG: 50 TABLET ORAL at 07:05

## 2021-11-24 RX ADMIN — NEPHROCAP 1 CAPSULE: 1 CAP ORAL at 16:00

## 2021-11-24 RX ADMIN — ALUMINA, MAGNESIA, AND SIMETHICONE ORAL SUSPENSION REGULAR STRENGTH 30 ML: 1200; 1200; 120 SUSPENSION ORAL at 09:32

## 2021-11-24 RX ADMIN — LEVOTHYROXINE SODIUM 50 MCG: 50 TABLET ORAL at 07:03

## 2021-11-24 RX ADMIN — SEVELAMER HYDROCHLORIDE 1600 MG: 800 TABLET, FILM COATED PARENTERAL at 07:03

## 2021-11-24 RX ADMIN — LIDOCAINE 5% 2 PATCH: 700 PATCH TOPICAL at 21:39

## 2021-11-24 RX ADMIN — WARFARIN SODIUM 9 MG: 3 TABLET ORAL at 17:08

## 2021-11-24 RX ADMIN — INSULIN LISPRO 1 UNITS: 100 INJECTION, SOLUTION INTRAVENOUS; SUBCUTANEOUS at 21:39

## 2021-11-24 RX ADMIN — LORATADINE 10 MG: 10 TABLET ORAL at 07:04

## 2021-11-24 RX ADMIN — OXYCODONE HYDROCHLORIDE AND ACETAMINOPHEN 2 TABLET: 5; 325 TABLET ORAL at 09:42

## 2021-11-24 RX ADMIN — INSULIN GLARGINE 15 UNITS: 100 INJECTION, SOLUTION SUBCUTANEOUS at 21:39

## 2021-11-24 RX ADMIN — CINACALCET 30 MG: 30 TABLET, FILM COATED ORAL at 07:05

## 2021-11-24 RX ADMIN — INSULIN LISPRO 1 UNITS: 100 INJECTION, SOLUTION INTRAVENOUS; SUBCUTANEOUS at 13:20

## 2021-11-24 RX ADMIN — METOCLOPRAMIDE HYDROCHLORIDE 5 MG: 5 INJECTION INTRAMUSCULAR; INTRAVENOUS at 08:13

## 2021-11-24 RX ADMIN — PANTOPRAZOLE SODIUM 40 MG: 40 TABLET, DELAYED RELEASE ORAL at 15:58

## 2021-11-24 RX ADMIN — DIPHENHYDRAMINE HCL 12.5 MG: 25 TABLET ORAL at 16:57

## 2021-11-24 RX ADMIN — ONDANSETRON 4 MG: 4 TABLET, ORALLY DISINTEGRATING ORAL at 13:20

## 2021-11-24 RX ADMIN — MELATONIN TAB 3 MG 3 MG: 3 TAB at 21:39

## 2021-11-24 RX ADMIN — CARVEDILOL 25 MG: 25 TABLET, FILM COATED ORAL at 15:58

## 2021-11-25 LAB
ANION GAP SERPL CALCULATED.3IONS-SCNC: 6 MMOL/L (ref 4–13)
BUN SERPL-MCNC: 44 MG/DL (ref 5–25)
CALCIUM SERPL-MCNC: 8.3 MG/DL (ref 8.3–10.1)
CHLORIDE SERPL-SCNC: 103 MMOL/L (ref 100–108)
CO2 SERPL-SCNC: 26 MMOL/L (ref 21–32)
CREAT SERPL-MCNC: 9.37 MG/DL (ref 0.6–1.3)
GFR SERPL CREATININE-BSD FRML MDRD: 4 ML/MIN/1.73SQ M
GLUCOSE SERPL-MCNC: 134 MG/DL (ref 65–140)
GLUCOSE SERPL-MCNC: 188 MG/DL (ref 65–140)
GLUCOSE SERPL-MCNC: 256 MG/DL (ref 65–140)
GLUCOSE SERPL-MCNC: 79 MG/DL (ref 65–140)
GLUCOSE SERPL-MCNC: 81 MG/DL (ref 65–140)
INR PPP: 2.8 (ref 0.84–1.19)
POTASSIUM SERPL-SCNC: 4.2 MMOL/L (ref 3.5–5.3)
PROTHROMBIN TIME: 28.1 SECONDS (ref 11.6–14.5)
SODIUM SERPL-SCNC: 135 MMOL/L (ref 136–145)

## 2021-11-25 PROCEDURE — 99232 SBSQ HOSP IP/OBS MODERATE 35: CPT | Performed by: INTERNAL MEDICINE

## 2021-11-25 PROCEDURE — 85610 PROTHROMBIN TIME: CPT | Performed by: INTERNAL MEDICINE

## 2021-11-25 PROCEDURE — 82948 REAGENT STRIP/BLOOD GLUCOSE: CPT

## 2021-11-25 PROCEDURE — 80048 BASIC METABOLIC PNL TOTAL CA: CPT | Performed by: INTERNAL MEDICINE

## 2021-11-25 RX ORDER — METOCLOPRAMIDE HYDROCHLORIDE 5 MG/5ML
5 SOLUTION ORAL EVERY 6 HOURS PRN
Status: DISCONTINUED | OUTPATIENT
Start: 2021-11-25 | End: 2021-11-26 | Stop reason: HOSPADM

## 2021-11-25 RX ADMIN — ONDANSETRON 4 MG: 4 TABLET, ORALLY DISINTEGRATING ORAL at 09:10

## 2021-11-25 RX ADMIN — TORSEMIDE 100 MG: 20 TABLET ORAL at 09:09

## 2021-11-25 RX ADMIN — INSULIN LISPRO 2 UNITS: 100 INJECTION, SOLUTION INTRAVENOUS; SUBCUTANEOUS at 16:43

## 2021-11-25 RX ADMIN — CARVEDILOL 25 MG: 25 TABLET, FILM COATED ORAL at 16:39

## 2021-11-25 RX ADMIN — SEVELAMER HYDROCHLORIDE 1600 MG: 800 TABLET, FILM COATED PARENTERAL at 11:36

## 2021-11-25 RX ADMIN — SEVELAMER HYDROCHLORIDE 1600 MG: 800 TABLET, FILM COATED PARENTERAL at 16:38

## 2021-11-25 RX ADMIN — LEVOTHYROXINE SODIUM 50 MCG: 50 TABLET ORAL at 09:09

## 2021-11-25 RX ADMIN — WARFARIN SODIUM 9 MG: 3 TABLET ORAL at 16:40

## 2021-11-25 RX ADMIN — METOCLOPRAMIDE HYDROCHLORIDE 5 MG: 5 SOLUTION ORAL at 11:37

## 2021-11-25 RX ADMIN — INSULIN GLARGINE 15 UNITS: 100 INJECTION, SOLUTION SUBCUTANEOUS at 22:38

## 2021-11-25 RX ADMIN — ATORVASTATIN CALCIUM 20 MG: 20 TABLET, FILM COATED ORAL at 16:38

## 2021-11-25 RX ADMIN — ALPRAZOLAM 0.25 MG: 0.25 TABLET ORAL at 22:37

## 2021-11-25 RX ADMIN — SEVELAMER HYDROCHLORIDE 1600 MG: 800 TABLET, FILM COATED PARENTERAL at 09:09

## 2021-11-25 RX ADMIN — LORATADINE 10 MG: 10 TABLET ORAL at 09:09

## 2021-11-25 RX ADMIN — OXYCODONE HYDROCHLORIDE AND ACETAMINOPHEN 2 TABLET: 5; 325 TABLET ORAL at 16:40

## 2021-11-25 RX ADMIN — PANTOPRAZOLE SODIUM 40 MG: 40 TABLET, DELAYED RELEASE ORAL at 16:39

## 2021-11-25 RX ADMIN — OXYCODONE HYDROCHLORIDE AND ACETAMINOPHEN 2 TABLET: 5; 325 TABLET ORAL at 09:10

## 2021-11-25 RX ADMIN — INSULIN LISPRO 1 UNITS: 100 INJECTION, SOLUTION INTRAVENOUS; SUBCUTANEOUS at 22:39

## 2021-11-25 RX ADMIN — SERTRALINE HYDROCHLORIDE 50 MG: 50 TABLET ORAL at 09:10

## 2021-11-25 RX ADMIN — CARVEDILOL 25 MG: 25 TABLET, FILM COATED ORAL at 09:10

## 2021-11-25 RX ADMIN — OXYCODONE HYDROCHLORIDE AND ACETAMINOPHEN 2 TABLET: 5; 325 TABLET ORAL at 22:38

## 2021-11-25 RX ADMIN — PANTOPRAZOLE SODIUM 40 MG: 40 TABLET, DELAYED RELEASE ORAL at 09:09

## 2021-11-25 RX ADMIN — NEPHROCAP 1 CAPSULE: 1 CAP ORAL at 16:38

## 2021-11-25 RX ADMIN — CINACALCET 30 MG: 30 TABLET, FILM COATED ORAL at 16:38

## 2021-11-25 RX ADMIN — NIFEDIPINE 30 MG: 30 TABLET, FILM COATED, EXTENDED RELEASE ORAL at 09:13

## 2021-11-25 RX ADMIN — DIPHENHYDRAMINE HCL 12.5 MG: 25 TABLET ORAL at 09:10

## 2021-11-25 RX ADMIN — METOCLOPRAMIDE HYDROCHLORIDE 5 MG: 5 SOLUTION ORAL at 16:42

## 2021-11-25 RX ADMIN — MELATONIN TAB 3 MG 3 MG: 3 TAB at 22:38

## 2021-11-25 RX ADMIN — METOCLOPRAMIDE HYDROCHLORIDE 5 MG: 5 SOLUTION ORAL at 22:36

## 2021-11-26 ENCOUNTER — APPOINTMENT (INPATIENT)
Dept: DIALYSIS | Facility: HOSPITAL | Age: 56
DRG: 073 | End: 2021-11-26
Attending: INTERNAL MEDICINE
Payer: MEDICARE

## 2021-11-26 VITALS
BODY MASS INDEX: 42.87 KG/M2 | OXYGEN SATURATION: 98 % | DIASTOLIC BLOOD PRESSURE: 84 MMHG | RESPIRATION RATE: 18 BRPM | HEIGHT: 66 IN | WEIGHT: 266.76 LBS | HEART RATE: 73 BPM | TEMPERATURE: 97.5 F | SYSTOLIC BLOOD PRESSURE: 159 MMHG

## 2021-11-26 LAB
GLUCOSE SERPL-MCNC: 106 MG/DL (ref 65–140)
GLUCOSE SERPL-MCNC: 118 MG/DL (ref 65–140)

## 2021-11-26 PROCEDURE — 82948 REAGENT STRIP/BLOOD GLUCOSE: CPT

## 2021-11-26 PROCEDURE — 90935 HEMODIALYSIS ONE EVALUATION: CPT | Performed by: INTERNAL MEDICINE

## 2021-11-26 PROCEDURE — 99239 HOSP IP/OBS DSCHRG MGMT >30: CPT | Performed by: INTERNAL MEDICINE

## 2021-11-26 RX ORDER — METOCLOPRAMIDE HYDROCHLORIDE 5 MG/5ML
5 SOLUTION ORAL EVERY 6 HOURS PRN
Qty: 120 ML | Refills: 1 | Status: SHIPPED | OUTPATIENT
Start: 2021-11-26 | End: 2022-07-29

## 2021-11-26 RX ORDER — DIPHENHYDRAMINE HCL 25 MG
12.5 TABLET ORAL ONCE
Status: COMPLETED | OUTPATIENT
Start: 2021-11-26 | End: 2021-11-26

## 2021-11-26 RX ADMIN — SEVELAMER HYDROCHLORIDE 1600 MG: 800 TABLET, FILM COATED PARENTERAL at 07:39

## 2021-11-26 RX ADMIN — CINACALCET 30 MG: 30 TABLET, FILM COATED ORAL at 13:54

## 2021-11-26 RX ADMIN — LORATADINE 10 MG: 10 TABLET ORAL at 13:49

## 2021-11-26 RX ADMIN — NIFEDIPINE 30 MG: 30 TABLET, FILM COATED, EXTENDED RELEASE ORAL at 13:55

## 2021-11-26 RX ADMIN — OXYCODONE HYDROCHLORIDE AND ACETAMINOPHEN 2 TABLET: 5; 325 TABLET ORAL at 13:52

## 2021-11-26 RX ADMIN — LEVOTHYROXINE SODIUM 50 MCG: 50 TABLET ORAL at 13:52

## 2021-11-26 RX ADMIN — ALPRAZOLAM 0.25 MG: 0.25 TABLET ORAL at 08:43

## 2021-11-26 RX ADMIN — SEVELAMER HYDROCHLORIDE 1600 MG: 800 TABLET, FILM COATED PARENTERAL at 13:52

## 2021-11-26 RX ADMIN — DIPHENHYDRAMINE HCL 12.5 MG: 25 TABLET ORAL at 11:14

## 2021-11-26 RX ADMIN — METOCLOPRAMIDE HYDROCHLORIDE 5 MG: 5 SOLUTION ORAL at 08:43

## 2021-11-26 RX ADMIN — OXYCODONE HYDROCHLORIDE AND ACETAMINOPHEN 2 TABLET: 5; 325 TABLET ORAL at 07:39

## 2021-11-26 RX ADMIN — PANTOPRAZOLE SODIUM 40 MG: 40 TABLET, DELAYED RELEASE ORAL at 07:39

## 2021-11-26 RX ADMIN — SERTRALINE HYDROCHLORIDE 50 MG: 50 TABLET ORAL at 13:49

## 2021-11-30 ENCOUNTER — TELEPHONE (OUTPATIENT)
Dept: GASTROENTEROLOGY | Facility: CLINIC | Age: 56
End: 2021-11-30

## 2021-12-01 ENCOUNTER — TELEPHONE (OUTPATIENT)
Dept: PODIATRY | Facility: CLINIC | Age: 56
End: 2021-12-01

## 2021-12-08 ENCOUNTER — TELEPHONE (OUTPATIENT)
Dept: GASTROENTEROLOGY | Facility: HOSPITAL | Age: 56
End: 2021-12-08

## 2021-12-09 ENCOUNTER — ANESTHESIA (OUTPATIENT)
Dept: GASTROENTEROLOGY | Facility: HOSPITAL | Age: 56
End: 2021-12-09

## 2021-12-09 ENCOUNTER — HOSPITAL ENCOUNTER (OUTPATIENT)
Dept: GASTROENTEROLOGY | Facility: HOSPITAL | Age: 56
Setting detail: OUTPATIENT SURGERY
Discharge: HOME | End: 2021-12-09
Attending: INTERNAL MEDICINE | Admitting: INTERNAL MEDICINE
Payer: MEDICARE

## 2021-12-09 ENCOUNTER — ANESTHESIA EVENT (OUTPATIENT)
Dept: GASTROENTEROLOGY | Facility: HOSPITAL | Age: 56
End: 2021-12-09

## 2021-12-09 ENCOUNTER — PREP FOR PROCEDURE (OUTPATIENT)
Dept: GASTROENTEROLOGY | Facility: CLINIC | Age: 56
End: 2021-12-09

## 2021-12-09 VITALS
SYSTOLIC BLOOD PRESSURE: 142 MMHG | HEART RATE: 65 BPM | RESPIRATION RATE: 16 BRPM | DIASTOLIC BLOOD PRESSURE: 60 MMHG | TEMPERATURE: 96.5 F | OXYGEN SATURATION: 96 %

## 2021-12-09 DIAGNOSIS — K52.9 COLITIS: Primary | ICD-10-CM

## 2021-12-09 DIAGNOSIS — Z12.11 COLON CANCER SCREENING: ICD-10-CM

## 2021-12-09 DIAGNOSIS — K59.09 CHRONIC CONSTIPATION: Primary | ICD-10-CM

## 2021-12-09 DIAGNOSIS — R10.84 GENERALIZED ABDOMINAL PAIN: ICD-10-CM

## 2021-12-09 PROCEDURE — 45385 COLONOSCOPY W/LESION REMOVAL: CPT | Performed by: INTERNAL MEDICINE

## 2021-12-09 PROCEDURE — 88341 IMHCHEM/IMCYTCHM EA ADD ANTB: CPT | Performed by: SPECIALIST

## 2021-12-09 PROCEDURE — 45380 COLONOSCOPY AND BIOPSY: CPT | Performed by: INTERNAL MEDICINE

## 2021-12-09 PROCEDURE — 88342 IMHCHEM/IMCYTCHM 1ST ANTB: CPT | Performed by: SPECIALIST

## 2021-12-09 PROCEDURE — 88305 TISSUE EXAM BY PATHOLOGIST: CPT | Performed by: SPECIALIST

## 2021-12-09 RX ORDER — PROPOFOL 10 MG/ML
INJECTION, EMULSION INTRAVENOUS CONTINUOUS PRN
Status: DISCONTINUED | OUTPATIENT
Start: 2021-12-09 | End: 2021-12-09

## 2021-12-09 RX ORDER — PROPOFOL 10 MG/ML
INJECTION, EMULSION INTRAVENOUS AS NEEDED
Status: DISCONTINUED | OUTPATIENT
Start: 2021-12-09 | End: 2021-12-09

## 2021-12-09 RX ORDER — SODIUM CHLORIDE 9 MG/ML
INJECTION, SOLUTION INTRAVENOUS CONTINUOUS PRN
Status: DISCONTINUED | OUTPATIENT
Start: 2021-12-09 | End: 2021-12-09

## 2021-12-09 RX ORDER — LIDOCAINE HYDROCHLORIDE 10 MG/ML
INJECTION, SOLUTION EPIDURAL; INFILTRATION; INTRACAUDAL; PERINEURAL AS NEEDED
Status: DISCONTINUED | OUTPATIENT
Start: 2021-12-09 | End: 2021-12-09

## 2021-12-09 RX ORDER — EPHEDRINE SULFATE 50 MG/ML
INJECTION INTRAVENOUS AS NEEDED
Status: DISCONTINUED | OUTPATIENT
Start: 2021-12-09 | End: 2021-12-09

## 2021-12-09 RX ORDER — POLYETHYLENE GLYCOL 3350 17 G/17G
17 POWDER, FOR SOLUTION ORAL DAILY
Qty: 510 G | Refills: 5 | Status: SHIPPED | OUTPATIENT
Start: 2021-12-09 | End: 2022-08-04

## 2021-12-09 RX ADMIN — LIDOCAINE HYDROCHLORIDE 100 MG: 10 INJECTION, SOLUTION EPIDURAL; INFILTRATION; INTRACAUDAL; PERINEURAL at 15:59

## 2021-12-09 RX ADMIN — PHENYLEPHRINE HYDROCHLORIDE 20 MCG/MIN: 10 INJECTION INTRAVENOUS at 16:18

## 2021-12-09 RX ADMIN — EPHEDRINE SULFATE 10 MG: 50 INJECTION, SOLUTION INTRAVENOUS at 16:15

## 2021-12-09 RX ADMIN — PROPOFOL 100 MG: 10 INJECTION, EMULSION INTRAVENOUS at 15:59

## 2021-12-09 RX ADMIN — PROPOFOL 20 MG: 10 INJECTION, EMULSION INTRAVENOUS at 16:28

## 2021-12-09 RX ADMIN — PROPOFOL 120 MCG/KG/MIN: 10 INJECTION, EMULSION INTRAVENOUS at 15:59

## 2021-12-09 RX ADMIN — EPHEDRINE SULFATE 10 MG: 50 INJECTION, SOLUTION INTRAVENOUS at 16:18

## 2021-12-09 RX ADMIN — SODIUM CHLORIDE: 0.9 INJECTION, SOLUTION INTRAVENOUS at 15:53

## 2021-12-09 RX ADMIN — EPHEDRINE SULFATE 10 MG: 50 INJECTION, SOLUTION INTRAVENOUS at 16:21

## 2021-12-09 RX ADMIN — PROPOFOL 30 MG: 10 INJECTION, EMULSION INTRAVENOUS at 16:00

## 2021-12-14 ENCOUNTER — TELEPHONE (OUTPATIENT)
Dept: GASTROENTEROLOGY | Facility: CLINIC | Age: 56
End: 2021-12-14

## 2021-12-15 ENCOUNTER — OFFICE VISIT (OUTPATIENT)
Dept: PODIATRY | Facility: CLINIC | Age: 56
End: 2021-12-15

## 2021-12-15 VITALS
DIASTOLIC BLOOD PRESSURE: 83 MMHG | WEIGHT: 269 LBS | HEART RATE: 77 BPM | BODY MASS INDEX: 43.42 KG/M2 | SYSTOLIC BLOOD PRESSURE: 160 MMHG

## 2021-12-15 DIAGNOSIS — E11.42 DIABETIC POLYNEUROPATHY ASSOCIATED WITH TYPE 2 DIABETES MELLITUS (HCC): Primary | ICD-10-CM

## 2021-12-15 DIAGNOSIS — T81.32XA DISRUPTION OF INTERNAL SURGICAL WOUND, INITIAL ENCOUNTER: ICD-10-CM

## 2021-12-15 PROCEDURE — 99024 POSTOP FOLLOW-UP VISIT: CPT | Performed by: PODIATRIST

## 2021-12-19 ENCOUNTER — HOSPITAL ENCOUNTER (EMERGENCY)
Facility: HOSPITAL | Age: 56
Discharge: HOME | DRG: 617 | End: 2021-12-19
Attending: EMERGENCY MEDICINE
Payer: MEDICARE

## 2021-12-19 VITALS
RESPIRATION RATE: 18 BRPM | HEART RATE: 84 BPM | BODY MASS INDEX: 43.42 KG/M2 | OXYGEN SATURATION: 97 % | DIASTOLIC BLOOD PRESSURE: 82 MMHG | TEMPERATURE: 98.5 F | SYSTOLIC BLOOD PRESSURE: 210 MMHG | WEIGHT: 269 LBS

## 2021-12-19 DIAGNOSIS — L97.509 FOOT ULCER (HCC): Primary | ICD-10-CM

## 2021-12-19 PROCEDURE — 99283 EMERGENCY DEPT VISIT LOW MDM: CPT

## 2021-12-19 PROCEDURE — 99284 EMERGENCY DEPT VISIT MOD MDM: CPT | Performed by: EMERGENCY MEDICINE

## 2021-12-19 RX ORDER — ACETAMINOPHEN 325 MG/1
975 TABLET ORAL ONCE
Status: DISCONTINUED | OUTPATIENT
Start: 2021-12-19 | End: 2021-12-19

## 2021-12-19 RX ORDER — OXYCODONE HYDROCHLORIDE AND ACETAMINOPHEN 5; 325 MG/1; MG/1
1 TABLET ORAL ONCE
Status: DISCONTINUED | OUTPATIENT
Start: 2021-12-19 | End: 2021-12-20 | Stop reason: HOSPADM

## 2021-12-19 RX ORDER — CEPHALEXIN 500 MG/1
500 CAPSULE ORAL EVERY 12 HOURS SCHEDULED
Qty: 14 CAPSULE | Refills: 0 | Status: SHIPPED | OUTPATIENT
Start: 2021-12-19 | End: 2022-01-12 | Stop reason: HOSPADM

## 2021-12-19 NOTE — Clinical Note
accompanied Mitch Rodrigez to the emergency department on 12/19/2021  Return date if applicable: If you have any questions or concerns, please don't hesitate to call        Maxwell Hernandez MD

## 2021-12-19 NOTE — Clinical Note
Cecilia Walsh was seen and treated in our emergency department on 12/19/2021  Diagnosis:     Zoran Oquendo    She may return on this date: If you have any questions or concerns, please don't hesitate to call        Mahi Arriola MD    ______________________________           _______________          _______________  Hospital Representative                              Date                                Time

## 2021-12-20 ENCOUNTER — TELEPHONE (OUTPATIENT)
Dept: OBGYN CLINIC | Facility: HOSPITAL | Age: 56
End: 2021-12-20

## 2021-12-20 ENCOUNTER — HOSPITAL ENCOUNTER (INPATIENT)
Facility: HOSPITAL | Age: 56
LOS: 23 days | Discharge: HOME HEALTH | DRG: 617 | End: 2022-01-12
Attending: EMERGENCY MEDICINE | Admitting: INTERNAL MEDICINE
Payer: MEDICARE

## 2021-12-20 ENCOUNTER — APPOINTMENT (EMERGENCY)
Dept: RADIOLOGY | Facility: HOSPITAL | Age: 56
DRG: 617 | End: 2021-12-20
Payer: MEDICARE

## 2021-12-20 DIAGNOSIS — L97.518 ULCER OF RIGHT FOOT WITH OTHER SEVERITY (HCC): ICD-10-CM

## 2021-12-20 DIAGNOSIS — L97.516 ULCER OF RIGHT FOOT WITH BONE INVOLVEMENT WITHOUT EVIDENCE OF NECROSIS (HCC): ICD-10-CM

## 2021-12-20 DIAGNOSIS — L97.509 FOOT ULCER (HCC): Primary | ICD-10-CM

## 2021-12-20 DIAGNOSIS — Z79.4 TYPE 2 DIABETES MELLITUS WITH DIABETIC POLYNEUROPATHY, WITH LONG-TERM CURRENT USE OF INSULIN (HCC): ICD-10-CM

## 2021-12-20 DIAGNOSIS — E11.42 TYPE 2 DIABETES MELLITUS WITH DIABETIC POLYNEUROPATHY, WITH LONG-TERM CURRENT USE OF INSULIN (HCC): ICD-10-CM

## 2021-12-20 PROBLEM — L97.519 FOOT ULCER, RIGHT (HCC): Status: ACTIVE | Noted: 2021-12-20

## 2021-12-20 LAB
ANION GAP SERPL CALCULATED.3IONS-SCNC: 7 MMOL/L (ref 4–13)
BASOPHILS # BLD AUTO: 0.03 THOUSANDS/ΜL (ref 0–0.1)
BASOPHILS NFR BLD AUTO: 1 % (ref 0–1)
BUN SERPL-MCNC: 44 MG/DL (ref 5–25)
CALCIUM SERPL-MCNC: 9 MG/DL (ref 8.3–10.1)
CHLORIDE SERPL-SCNC: 103 MMOL/L (ref 100–108)
CO2 SERPL-SCNC: 26 MMOL/L (ref 21–32)
CREAT SERPL-MCNC: 7.78 MG/DL (ref 0.6–1.3)
CRP SERPL QL: 103 MG/L
EOSINOPHIL # BLD AUTO: 0.15 THOUSAND/ΜL (ref 0–0.61)
EOSINOPHIL NFR BLD AUTO: 2 % (ref 0–6)
ERYTHROCYTE [DISTWIDTH] IN BLOOD BY AUTOMATED COUNT: 14.8 % (ref 11.6–15.1)
ERYTHROCYTE [SEDIMENTATION RATE] IN BLOOD: 42 MM/HOUR (ref 0–29)
GFR SERPL CREATININE-BSD FRML MDRD: 5 ML/MIN/1.73SQ M
GLUCOSE SERPL-MCNC: 176 MG/DL (ref 65–140)
HCT VFR BLD AUTO: 24.1 % (ref 34.8–46.1)
HGB BLD-MCNC: 7.8 G/DL (ref 11.5–15.4)
IMM GRANULOCYTES # BLD AUTO: 0.02 THOUSAND/UL (ref 0–0.2)
IMM GRANULOCYTES NFR BLD AUTO: 0 % (ref 0–2)
INR PPP: 2.65 (ref 0.84–1.19)
LYMPHOCYTES # BLD AUTO: 1.17 THOUSANDS/ΜL (ref 0.6–4.47)
LYMPHOCYTES NFR BLD AUTO: 18 % (ref 14–44)
MCH RBC QN AUTO: 30.4 PG (ref 26.8–34.3)
MCHC RBC AUTO-ENTMCNC: 32.4 G/DL (ref 31.4–37.4)
MCV RBC AUTO: 94 FL (ref 82–98)
MONOCYTES # BLD AUTO: 0.7 THOUSAND/ΜL (ref 0.17–1.22)
MONOCYTES NFR BLD AUTO: 11 % (ref 4–12)
NEUTROPHILS # BLD AUTO: 4.39 THOUSANDS/ΜL (ref 1.85–7.62)
NEUTS SEG NFR BLD AUTO: 68 % (ref 43–75)
NRBC BLD AUTO-RTO: 0 /100 WBCS
PLATELET # BLD AUTO: 195 THOUSANDS/UL (ref 149–390)
PMV BLD AUTO: 10.9 FL (ref 8.9–12.7)
POTASSIUM SERPL-SCNC: 4.4 MMOL/L (ref 3.5–5.3)
PROTHROMBIN TIME: 27 SECONDS (ref 11.6–14.5)
RBC # BLD AUTO: 2.57 MILLION/UL (ref 3.81–5.12)
SODIUM SERPL-SCNC: 136 MMOL/L (ref 136–145)
WBC # BLD AUTO: 6.46 THOUSAND/UL (ref 4.31–10.16)

## 2021-12-20 PROCEDURE — 99285 EMERGENCY DEPT VISIT HI MDM: CPT | Performed by: EMERGENCY MEDICINE

## 2021-12-20 PROCEDURE — 85610 PROTHROMBIN TIME: CPT | Performed by: INTERNAL MEDICINE

## 2021-12-20 PROCEDURE — 87075 CULTR BACTERIA EXCEPT BLOOD: CPT | Performed by: STUDENT IN AN ORGANIZED HEALTH CARE EDUCATION/TRAINING PROGRAM

## 2021-12-20 PROCEDURE — 82948 REAGENT STRIP/BLOOD GLUCOSE: CPT

## 2021-12-20 PROCEDURE — 99024 POSTOP FOLLOW-UP VISIT: CPT | Performed by: PODIATRIST

## 2021-12-20 PROCEDURE — 85652 RBC SED RATE AUTOMATED: CPT | Performed by: STUDENT IN AN ORGANIZED HEALTH CARE EDUCATION/TRAINING PROGRAM

## 2021-12-20 PROCEDURE — 87040 BLOOD CULTURE FOR BACTERIA: CPT | Performed by: INTERNAL MEDICINE

## 2021-12-20 PROCEDURE — 80048 BASIC METABOLIC PNL TOTAL CA: CPT | Performed by: STUDENT IN AN ORGANIZED HEALTH CARE EDUCATION/TRAINING PROGRAM

## 2021-12-20 PROCEDURE — 87070 CULTURE OTHR SPECIMN AEROBIC: CPT | Performed by: STUDENT IN AN ORGANIZED HEALTH CARE EDUCATION/TRAINING PROGRAM

## 2021-12-20 PROCEDURE — 99223 1ST HOSP IP/OBS HIGH 75: CPT | Performed by: INTERNAL MEDICINE

## 2021-12-20 PROCEDURE — 87186 SC STD MICRODIL/AGAR DIL: CPT | Performed by: STUDENT IN AN ORGANIZED HEALTH CARE EDUCATION/TRAINING PROGRAM

## 2021-12-20 PROCEDURE — 73630 X-RAY EXAM OF FOOT: CPT

## 2021-12-20 PROCEDURE — 86140 C-REACTIVE PROTEIN: CPT | Performed by: STUDENT IN AN ORGANIZED HEALTH CARE EDUCATION/TRAINING PROGRAM

## 2021-12-20 PROCEDURE — 87205 SMEAR GRAM STAIN: CPT | Performed by: STUDENT IN AN ORGANIZED HEALTH CARE EDUCATION/TRAINING PROGRAM

## 2021-12-20 PROCEDURE — 36415 COLL VENOUS BLD VENIPUNCTURE: CPT | Performed by: STUDENT IN AN ORGANIZED HEALTH CARE EDUCATION/TRAINING PROGRAM

## 2021-12-20 PROCEDURE — 85025 COMPLETE CBC W/AUTO DIFF WBC: CPT | Performed by: STUDENT IN AN ORGANIZED HEALTH CARE EDUCATION/TRAINING PROGRAM

## 2021-12-20 PROCEDURE — 99285 EMERGENCY DEPT VISIT HI MDM: CPT

## 2021-12-20 RX ORDER — GABAPENTIN 100 MG/1
100 CAPSULE ORAL 3 TIMES DAILY
COMMUNITY
End: 2022-04-15

## 2021-12-20 RX ORDER — ALPRAZOLAM 0.25 MG/1
0.25 TABLET ORAL 2 TIMES DAILY PRN
Status: DISCONTINUED | OUTPATIENT
Start: 2021-12-20 | End: 2021-12-21

## 2021-12-20 RX ORDER — LORATADINE 10 MG/1
10 TABLET ORAL DAILY
Status: DISCONTINUED | OUTPATIENT
Start: 2021-12-21 | End: 2021-12-24

## 2021-12-20 RX ORDER — LANOLIN ALCOHOL/MO/W.PET/CERES
3 CREAM (GRAM) TOPICAL
Status: DISCONTINUED | OUTPATIENT
Start: 2021-12-20 | End: 2022-01-12 | Stop reason: HOSPADM

## 2021-12-20 RX ORDER — OXYCODONE HYDROCHLORIDE AND ACETAMINOPHEN 5; 325 MG/1; MG/1
1 TABLET ORAL EVERY 6 HOURS PRN
Status: DISCONTINUED | OUTPATIENT
Start: 2021-12-20 | End: 2022-01-03

## 2021-12-20 RX ORDER — CEFAZOLIN SODIUM 1 G/50ML
1000 SOLUTION INTRAVENOUS EVERY 24 HOURS
Status: DISCONTINUED | OUTPATIENT
Start: 2021-12-21 | End: 2021-12-21

## 2021-12-20 RX ORDER — HYDRALAZINE HYDROCHLORIDE 20 MG/ML
5 INJECTION INTRAMUSCULAR; INTRAVENOUS EVERY 6 HOURS PRN
Status: DISCONTINUED | OUTPATIENT
Start: 2021-12-20 | End: 2022-01-12 | Stop reason: HOSPADM

## 2021-12-20 RX ORDER — NYSTATIN 100000 [USP'U]/G
POWDER TOPICAL 2 TIMES DAILY
Status: DISCONTINUED | OUTPATIENT
Start: 2021-12-20 | End: 2022-01-12 | Stop reason: HOSPADM

## 2021-12-20 RX ORDER — SEVELAMER HYDROCHLORIDE 800 MG/1
1600 TABLET, FILM COATED ORAL
Status: DISCONTINUED | OUTPATIENT
Start: 2021-12-21 | End: 2022-01-12 | Stop reason: HOSPADM

## 2021-12-20 RX ORDER — INSULIN GLARGINE 100 [IU]/ML
20 INJECTION, SOLUTION SUBCUTANEOUS
Status: DISCONTINUED | OUTPATIENT
Start: 2021-12-20 | End: 2021-12-25

## 2021-12-20 RX ORDER — ACETAMINOPHEN 325 MG/1
650 TABLET ORAL EVERY 6 HOURS PRN
Status: DISCONTINUED | OUTPATIENT
Start: 2021-12-20 | End: 2022-01-11

## 2021-12-20 RX ORDER — CARVEDILOL 25 MG/1
25 TABLET ORAL 2 TIMES DAILY WITH MEALS
Status: DISCONTINUED | OUTPATIENT
Start: 2021-12-21 | End: 2022-01-12 | Stop reason: HOSPADM

## 2021-12-20 RX ORDER — ALBUTEROL SULFATE 90 UG/1
2 AEROSOL, METERED RESPIRATORY (INHALATION) EVERY 6 HOURS PRN
Status: DISCONTINUED | OUTPATIENT
Start: 2021-12-20 | End: 2022-01-12 | Stop reason: HOSPADM

## 2021-12-20 RX ORDER — LEVOTHYROXINE SODIUM 0.05 MG/1
50 TABLET ORAL DAILY
Status: DISCONTINUED | OUTPATIENT
Start: 2021-12-21 | End: 2022-01-12 | Stop reason: HOSPADM

## 2021-12-20 RX ORDER — SENNOSIDES 8.6 MG
2 TABLET ORAL
Status: DISCONTINUED | OUTPATIENT
Start: 2021-12-20 | End: 2022-01-11

## 2021-12-20 RX ORDER — ONDANSETRON 2 MG/ML
4 INJECTION INTRAMUSCULAR; INTRAVENOUS EVERY 6 HOURS PRN
Status: DISCONTINUED | OUTPATIENT
Start: 2021-12-20 | End: 2021-12-25

## 2021-12-20 RX ORDER — PANTOPRAZOLE SODIUM 40 MG/1
40 TABLET, DELAYED RELEASE ORAL
Status: DISCONTINUED | OUTPATIENT
Start: 2021-12-21 | End: 2022-01-12 | Stop reason: HOSPADM

## 2021-12-20 RX ORDER — CEPHALEXIN 250 MG/1
500 CAPSULE ORAL ONCE
Status: COMPLETED | OUTPATIENT
Start: 2021-12-20 | End: 2021-12-20

## 2021-12-20 RX ORDER — CINACALCET 30 MG/1
30 TABLET, FILM COATED ORAL DAILY
Status: DISCONTINUED | OUTPATIENT
Start: 2021-12-21 | End: 2022-01-12 | Stop reason: HOSPADM

## 2021-12-20 RX ORDER — POLYETHYLENE GLYCOL 3350 17 G/17G
17 POWDER, FOR SOLUTION ORAL DAILY
Status: DISCONTINUED | OUTPATIENT
Start: 2021-12-21 | End: 2021-12-23

## 2021-12-20 RX ORDER — NIFEDIPINE 30 MG/1
30 TABLET, EXTENDED RELEASE ORAL DAILY
Status: DISCONTINUED | OUTPATIENT
Start: 2021-12-21 | End: 2022-01-12 | Stop reason: HOSPADM

## 2021-12-20 RX ORDER — DIPHENHYDRAMINE HCL 25 MG
25 TABLET ORAL ONCE
Status: COMPLETED | OUTPATIENT
Start: 2021-12-20 | End: 2021-12-20

## 2021-12-20 RX ADMIN — WARFARIN SODIUM 9 MG: 7.5 TABLET ORAL at 22:37

## 2021-12-20 RX ADMIN — DIPHENHYDRAMINE HCL 25 MG: 25 TABLET, COATED ORAL at 18:11

## 2021-12-20 RX ADMIN — Medication 3 MG: at 22:38

## 2021-12-20 RX ADMIN — CEPHALEXIN 500 MG: 250 CAPSULE ORAL at 16:17

## 2021-12-20 RX ADMIN — INSULIN GLARGINE 20 UNITS: 100 INJECTION, SOLUTION SUBCUTANEOUS at 22:37

## 2021-12-20 NOTE — ED ATTENDING ATTESTATION
12/19/2021  ITessie MD, saw and evaluated the patient  I have discussed the patient with the resident/non-physician practitioner and agree with the resident's/non-physician practitioner's findings, Plan of Care, and MDM as documented in the resident's/non-physician practitioner's note, except where noted  All available labs and Radiology studies were reviewed  I was present for key portions of any procedure(s) performed by the resident/non-physician practitioner and I was immediately available to provide assistance  At this point I agree with the current assessment done in the Emergency Department  I have conducted an independent evaluation of this patient a history and physical is as follows:   The patient presents for evaluation of an ulcer on her right metatarsophalangeal area it has been there for quite some time she is followed by Podiatry she was seen recently the same  She complains of pain in the area  No fever no chills no systemic symptoms patient does have history of diabetes and has had amputations of the 4th and  5th digits on the right patient has a superficial ulceration very mild erythema no crepitation there is no drainage  dorsal aspect of the 1st metatarsophalangeal area  Patient is requesting Percocet  She will be given a Percocet here we will start Keflex and will have her seen by her podiatrist tomorrow  If she develops any fever gets worse she should come back to the emergency room for re-evaluation  ED Course    follow-up has been arranged for tomorrow morning at the podiatrist's office      Critical Care Time  Procedures

## 2021-12-20 NOTE — ED NOTES
Pt ringing call bell stating "that antibiotic made me itchy I need benadryl"     Tyree Lujan, JEAN PIERRE  12/20/21 4589

## 2021-12-20 NOTE — ED NOTES
Pt using call bell, asking for tea stating "I get anxiety in the hospital"     Wai Jenkins, RN  12/20/21 6736

## 2021-12-20 NOTE — ED PROVIDER NOTES
History  Chief Complaint   Patient presents with    Foot Injury     Pt c/o right foot pain x 3 days  Pt had a wound vac on that foot which was taken out 3 weeks ago  Pt also reports clear, yellowish fluid leaking from that foot  HPI  61-year-old woman with morbid obesity, diabetes, diabetic foot ulcer, end-stage renal disease, hypertension and anxiety presents to the ER for evaluation of chronic right dorsal foot wound  Patient reports she has had this foot wound for significant amount of time  She reports worsening of pain gradually over this week  She reports the pain is worse when she walks over that right foot  She last met with Podiatry in 12/15/2021, at that time she was told was healing well and there was no concern for infection  She reports continued clear and yellow drainage that has been consistent  She ran out of Percocet and would like to have her medication refilled today  She has not tried any ibuprofen or Motrin at home because it does not work for her  She denies any fevers or chills  She denies any discoloration around the wound or discolored fluid drainage or follow odors  Patient's next follow-up with podiatry is this month  Patient denies headache, visual changes, dizziness, fevers, chills, chest pain, palpitations, abdominal pain, diarrhea, dysuria, new skin rashes or numbness or tingling of the extremities  Prior to Admission Medications   Prescriptions Last Dose Informant Patient Reported? Taking?    ALPRAZolam (XANAX) 0 25 mg tablet  Self Yes No   Sig: Take 0 25 mg by mouth 2 (two) times a day as needed for anxiety   Accu-Chek Guide test strip   Yes No   Sig: use to TEST BLOOD SUGAR two to three times a day   Accu-Chek Softclix Lancets lancets  Self Yes No   Sig: 3 (three) times a day Use to test blood sugar   B Complex-C-Folic Acid (Natalia-Denys) TABS   Yes No   Sig: Take 1 tablet by mouth daily   KIONEX 15 GM/60ML suspension  Self Yes No   Sig: Take by mouth Takes as a shake on dialysis days Tues-Thurs_Sat   NIFEdipine (ADALAT CC) 30 MG 24 hr tablet   No No   Sig: Take 1 tablet (30 mg total) by mouth daily   NOVOLOG FLEXPEN 100 units/mL SOPN  Self Yes No   Sig: INJECT 1 TO 5 UNITS SUBCUTANEOUSLY THREE TIMES A DAY BEFORE MELAS AS PER SLIDING SCALE   acetaminophen (TYLENOL) 325 mg tablet  Self No No   Sig: Take 2 tablets (650 mg total) by mouth every 6 (six) hours as needed for mild pain or fever   Patient not taking: Reported on 10/27/2021   albuterol (ProAir HFA) 90 mcg/act inhaler   No No   Sig: Inhale 2 puffs every 6 (six) hours as needed for wheezing or shortness of breath   Patient not taking: Reported on 10/27/2021   ammonium lactate (LAC-HYDRIN) 12 % cream   No No   Sig: Apply topically 2 (two) times a day   atorvastatin (LIPITOR) 20 mg tablet  Self Yes No   Sig: Take 20 mg by mouth every evening    carvedilol (COREG) 25 mg tablet  Self Yes No   Sig: Take 25 mg by mouth 2 (two) times a day with meals     cinacalcet (SENSIPAR) 30 mg tablet  Self Yes No   Sig: Take 30 mg by mouth daily   insulin glargine (Lantus SoloStar) 100 units/mL injection pen   No No   Sig: Inject 20 Units under the skin daily at bedtime   levothyroxine 50 mcg tablet  Self Yes No   Sig: Take 50 mcg by mouth daily   loratadine (CLARITIN) 10 mg tablet  Self Yes No   Sig: Take 10 mg by mouth daily   melatonin 3 mg   No No   Sig: Take 1 tablet (3 mg total) by mouth daily at bedtime   metoclopramide (REGLAN) 10 mg/10 mL oral solution   No No   Sig: Take 5 mL (5 mg total) by mouth every 6 (six) hours as needed (nausea)   nystatin (MYCOSTATIN) powder  Self No No   Sig: Apply topically 2 (two) times a day   oxyCODONE-acetaminophen (PERCOCET) 5-325 mg per tablet   No No   Sig: Take 1 tablet by mouth every 6 (six) hours as needed for moderate pain for up to 10 dosesMax Daily Amount: 4 tablets   pantoprazole (PROTONIX) 40 mg tablet   Yes No   Sig: take 1 tablet by mouth twice a day   polyethylene glycol (MIRALAX) 17 g packet   No No   Sig: Take 17 g by mouth daily for 7 days   polyethylene glycol (MIRALAX) 17 g packet   No No   Sig: Take 17 g by mouth daily   senna (SENOKOT) 8 6 mg  Self No No   Sig: Take 2 tablets (17 2 mg total) by mouth daily at bedtime as needed for constipation   sertraline (ZOLOFT) 50 mg tablet  Self No No   Sig: Take 1 tablet (50 mg total) by mouth daily   sevelamer (RENAGEL) 800 mg tablet   No No   Sig: Take 2 tablets (1,600 mg total) by mouth 3 (three) times a day with meals   torsemide (DEMADEX) 100 mg tablet   No No   Sig: Take 1 tablet (100 mg total) by mouth 4 (four) times a week On , Monday, Wednesday, Friday   warfarin (COUMADIN) 3 mg tablet  Self No No   Sig: Take 3 tablets (9 mg total) by mouth daily Takes 9 mg Monday Sat      Facility-Administered Medications: None       Past Medical History:   Diagnosis Date    Abnormal uterine bleeding (AUB)     Anemia     Anxiety     Arthritis     Chronic kidney disease     Claustrophobia     Diabetes mellitus ([facility] [place] [address] )     Disease of thyroid gland     DVT (deep venous thrombosis) (HCC)     End stage kidney disease (HCC)     Foot ulcer due to secondary DM ([facility] [place] [address] )     Hemodialysis patient ([facility] [place] [address] )     Tues, Thurs, Sat    Hypertension     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[facility] [place] [address] )     right eye    Morbid obesity ([facility] [place] [address] )     Osteomyelitis of fifth toe of right foot ([facility] [place] [address] )     Panic attacks     Pulmonary embolism (HCC)     Reflux esophagitis     Thrombophlebitis of saphenous vein     Warfarin anticoagulation        Past Surgical History:   Procedure Laterality Date    AMPUTATION      r 4th toe    ARTERIOVENOUS GRAFT PLACEMENT      CATARACT EXTRACTION Bilateral     CERVICAL BIOPSY  W/ LOOP ELECTRODE EXCISION       SECTION      DIALYSIS FISTULA CREATION      DILATION AND CURETTAGE OF UTERUS      ENDOMETRIAL ABLATION W/ NOVASURE      EYE SURGERY      HYSTERECTOMY      @ age 57 (complete)    IR AV FISTULAGRAM/GRAFTOGRAM 10/11/2019    IR AV FISTULAGRAM/GRAFTOGRAM  8/12/2020    IR AV FISTULAGRAM/GRAFTOGRAM  12/23/2020    IR NON-TUNNELED CENTRAL LINE PLACEMENT  6/29/2021    IR NON-TUNNELED CENTRAL LINE PLACEMENT  10/4/2021    OOPHORECTOMY Bilateral     @ age 57    PERICARDIUM SURGERY      KS AMPUTATION TOE,MT-P JT Right 5/28/2020    Procedure: AMPUTATION TOE- 5th;  Surgeon: Joann Oropeza DPM;  Location: [facility];  Service: Podiatry    KS COLONOSCOPY FLX DX W/COLLJ [address] PFRMD N/A 3/13/2019    Procedure: COLONOSCOPY;  Surgeon: Nkechi Lowe MD;  [facility]: [facility]; Service: Gastroenterology    KS ESOPHAGOGASTRODUODENOSCOPY TRANSORAL DIAGNOSTIC N/A 3/13/2019    Procedure: ESOPHAGOGASTRODUODENOSCOPY (EGD); Surgeon: Nkechi Lowe MD;  [facility]; Service: Gastroenterology    KS LAPAROSCOPY W TOT HYSTERECT UTERUS 250 GRAM OR LESS N/A 2/16/2016    Procedure: HYSTERECTOMY LAPAROSCOPIC TOTAL [place]), with bilateral salpingectomy;  Surgeon: Cielo Lipscomb DO;  [facility];  Service: Gynecology    THROMBECTOMY / ARTERIOVENOUS GRAFT REVISION      TOE SURGERY Right     removal of the 4th toe    WOUND DEBRIDEMENT Right 10/8/2021    Procedure: R 1st MTPJ washout ;  Surgeon: Elvie Kohli DPM;  [facility];  Service: Podiatry       Family History   Problem Relation Age of Onset    Heart disease Family     Diabetes Family     Heart disease Mother     Cancer Brother         neck    Throat cancer Brother     Ovarian cancer Maternal Aunt 36     I have reviewed and agree with the history as documented      E-Cigarette/Vaping    E-Cigarette Use Never User      E-Cigarette/Vaping Substances    Nicotine No     THC No     CBD No     Flavoring No     Other No     Unknown No      Social History     Tobacco Use    Smoking status: Never Smoker    Smokeless tobacco: Never Used   Vaping Use    Vaping Use: Never used   Substance Use Topics    Alcohol use: Never     Alcohol/week: 0 0 standard drinks    Drug use: No        Review of Systems   All other systems reviewed and are negative  Physical Exam  ED Triage Vitals   Temperature Pulse Respirations Blood Pressure SpO2   12/19/21 2113 12/19/21 2109 12/19/21 2109 12/19/21 2109 12/19/21 2109   98 5 °F (36 9 °C) 84 18 (!) 210/82 97 %      Temp Source Heart Rate Source Patient Position - Orthostatic VS BP Location FiO2 (%)   12/19/21 2113 12/19/21 2109 12/19/21 2109 12/19/21 2109 --   Oral Monitor Lying Right arm       Pain Score       12/19/21 2109       10 - Worst Possible Pain             Orthostatic Vital Signs  Vitals:    12/19/21 2109   BP: (!) 210/82   Pulse: 84   Patient Position - Orthostatic VS: Lying       Physical Exam  PHYSICAL EXAM    Constitutional:  Well developed, well nourished, no acute distress, non-toxic appearance    HEENT:  Conjunctiva normal  Oropharynx moist  Respiratory:  No respiratory distress, normal breath sounds  Cardiovascular:  Normal rate, normal rhythm, no murmurs  GI:  Soft, nondistended, normal bowel sounds, nontender  :  No costovertebral angle tenderness   Musculoskeletal:  No edema, no tenderness, no deformities     Integument:  Right dorsal wound with healthy granulation tissue, no surrounding cellulitis, mild clear drainage, no bleeding, no foul odor  Lymphatic:  No lymphadenopathy noted   Neurologic:  Alert & oriented, normal motor function, normal sensory function, no focal deficits noted   Psychiatric:  Speech and behavior appropriate     ED Medications  Medications   oxyCODONE-acetaminophen (PERCOCET) 5-325 mg per tablet 1 tablet (has no administration in time range)       Diagnostic Studies  Results Reviewed     None                 No orders to display         Procedures  Procedures      ED Course                                       MDM  Number of Diagnoses or Management Options  Foot ulcer ([facility] [address] )  Diagnosis management comments: 62-year-old woman with morbid obesity, diabetes, diabetic foot ulcer, end-stage renal disease, hypertension and anxiety presents to the ER for evaluation of chronic right dorsal foot wound  Right dorsal wound with healthy granulation tissue, no surrounding cellulitis, mild clear drainage, no bleeding, no foul odor  Patient continues to have right foot pain that she has had for last several months  Percocet in the ED  Pt encouraged to take tylenol at home, along with frequent foot elevations and foot rest   Contacted Podiatry, who will be able to see the patient in the clinic tomorrow  Patient also has wound care nurse coming tomorrow at 11:00 a m  Giovanny Mota At first the patient wanted to see Podiatry here in the ER but we discussed that she does not appear to have any infection or any worsening of her symptoms or systemic symptoms  She stated she understood and was happy to see Podiatry in clinic tomorrow  After this some thought, patient states she does not want to go to podiatry clinic tomorrow and would rather see her wound care nurse instead  Patient has podiatry follow-up and contact information to discuss any new or worsening symptoms  Amount and/or Complexity of Data Reviewed  Review and summarize past medical records: yes  Discuss the patient with other providers: yes    Risk of Complications, Morbidity, and/or Mortality  Presenting problems: moderate  Diagnostic procedures: moderate  Management options: low    Patient Progress  Patient progress: improved      Disposition  Final diagnoses: Foot ulcer ([address] )     Time reflects when diagnosis was documented in both MDM as applicable and the Disposition within this note     Time User Action Codes Description Comment    12/19/2021 10:18 PM Andria Theodore Add [[id number]] Foot ulcer [facility])       ED Disposition     ED Disposition Condition Date/Time Comment    Discharge Good Sun Dec 19, 2021 10:20 PM Stanislav Current discharge to home/self care              Follow-up Information     Follow up With Specialties Details Why [id number] [facility] Marcus Velázquez, DPM Podiatry, Wound Care Go in 1 day  [address] [facility] [id number] [facility] [address] [facility]            Patient's Medications   Discharge Prescriptions    CEPHALEXIN (KEFLEX) 500 MG CAPSULE    Take 1 capsule (500 mg total) by mouth every 12 (twelve) hours for 7 days       Start Date: 12/19/2021End Date: 12/26/2021       Order Dose: 500 mg       Quantity: 14 capsule    Refills: 0     No discharge procedures on file  PDMP Review       Value Time User    PDMP Reviewed  Yes 11/21/2021  1:00 PM Kayleigh Winslow MD           ED Provider  Attending physically available and evaluated Damion Thompson I managed the patient along with the ED Attending      Electronically Signed by         Abby Posadas MD  12/19/21 1167

## 2021-12-21 ENCOUNTER — APPOINTMENT (INPATIENT)
Dept: RADIOLOGY | Facility: HOSPITAL | Age: 56
DRG: 617 | End: 2021-12-21
Payer: MEDICARE

## 2021-12-21 ENCOUNTER — APPOINTMENT (INPATIENT)
Dept: DIALYSIS | Facility: HOSPITAL | Age: 56
DRG: 617 | End: 2021-12-21
Payer: MEDICARE

## 2021-12-21 LAB
ALBUMIN SERPL BCP-MCNC: 2.7 G/DL (ref 3.5–5)
ALP SERPL-CCNC: 141 U/L (ref 46–116)
ALT SERPL W P-5'-P-CCNC: 14 U/L (ref 12–78)
ANION GAP SERPL CALCULATED.3IONS-SCNC: 8 MMOL/L (ref 4–13)
AST SERPL W P-5'-P-CCNC: 12 U/L (ref 5–45)
BASOPHILS # BLD AUTO: 0.03 THOUSANDS/ΜL (ref 0–0.1)
BASOPHILS NFR BLD AUTO: 1 % (ref 0–1)
BILIRUB SERPL-MCNC: 0.46 MG/DL (ref 0.2–1)
BUN SERPL-MCNC: 51 MG/DL (ref 5–25)
CALCIUM ALBUM COR SERPL-MCNC: 9.2 MG/DL (ref 8.3–10.1)
CALCIUM SERPL-MCNC: 8.2 MG/DL (ref 8.3–10.1)
CHLORIDE SERPL-SCNC: 103 MMOL/L (ref 100–108)
CO2 SERPL-SCNC: 25 MMOL/L (ref 21–32)
CREAT SERPL-MCNC: 8.84 MG/DL (ref 0.6–1.3)
EOSINOPHIL # BLD AUTO: 0.22 THOUSAND/ΜL (ref 0–0.61)
EOSINOPHIL NFR BLD AUTO: 4 % (ref 0–6)
ERYTHROCYTE [DISTWIDTH] IN BLOOD BY AUTOMATED COUNT: 14.6 % (ref 11.6–15.1)
GFR SERPL CREATININE-BSD FRML MDRD: 4 ML/MIN/1.73SQ M
GLUCOSE SERPL-MCNC: 124 MG/DL (ref 65–140)
GLUCOSE SERPL-MCNC: 139 MG/DL (ref 65–140)
GLUCOSE SERPL-MCNC: 153 MG/DL (ref 65–140)
GLUCOSE SERPL-MCNC: 157 MG/DL (ref 65–140)
GLUCOSE SERPL-MCNC: 176 MG/DL (ref 65–140)
GLUCOSE SERPL-MCNC: 237 MG/DL (ref 65–140)
HCT VFR BLD AUTO: 23 % (ref 34.8–46.1)
HGB BLD-MCNC: 7.5 G/DL (ref 11.5–15.4)
IMM GRANULOCYTES # BLD AUTO: 0.03 THOUSAND/UL (ref 0–0.2)
IMM GRANULOCYTES NFR BLD AUTO: 1 % (ref 0–2)
INR PPP: 2.9 (ref 0.84–1.19)
LYMPHOCYTES # BLD AUTO: 0.73 THOUSANDS/ΜL (ref 0.6–4.47)
LYMPHOCYTES NFR BLD AUTO: 13 % (ref 14–44)
MAGNESIUM SERPL-MCNC: 2.1 MG/DL (ref 1.6–2.6)
MCH RBC QN AUTO: 30.1 PG (ref 26.8–34.3)
MCHC RBC AUTO-ENTMCNC: 32.6 G/DL (ref 31.4–37.4)
MCV RBC AUTO: 92 FL (ref 82–98)
MONOCYTES # BLD AUTO: 0.58 THOUSAND/ΜL (ref 0.17–1.22)
MONOCYTES NFR BLD AUTO: 10 % (ref 4–12)
NEUTROPHILS # BLD AUTO: 4.1 THOUSANDS/ΜL (ref 1.85–7.62)
NEUTS SEG NFR BLD AUTO: 71 % (ref 43–75)
NRBC BLD AUTO-RTO: 0 /100 WBCS
PHOSPHATE SERPL-MCNC: 4.1 MG/DL (ref 2.7–4.5)
PLATELET # BLD AUTO: 203 THOUSANDS/UL (ref 149–390)
PMV BLD AUTO: 10.5 FL (ref 8.9–12.7)
POTASSIUM SERPL-SCNC: 4.5 MMOL/L (ref 3.5–5.3)
PROT SERPL-MCNC: 6.4 G/DL (ref 6.4–8.2)
PROTHROMBIN TIME: 28.9 SECONDS (ref 11.6–14.5)
RBC # BLD AUTO: 2.49 MILLION/UL (ref 3.81–5.12)
SODIUM SERPL-SCNC: 136 MMOL/L (ref 136–145)
WBC # BLD AUTO: 5.69 THOUSAND/UL (ref 4.31–10.16)

## 2021-12-21 PROCEDURE — 84100 ASSAY OF PHOSPHORUS: CPT | Performed by: INTERNAL MEDICINE

## 2021-12-21 PROCEDURE — 99222 1ST HOSP IP/OBS MODERATE 55: CPT | Performed by: INTERNAL MEDICINE

## 2021-12-21 PROCEDURE — 83735 ASSAY OF MAGNESIUM: CPT | Performed by: INTERNAL MEDICINE

## 2021-12-21 PROCEDURE — 99223 1ST HOSP IP/OBS HIGH 75: CPT | Performed by: INTERNAL MEDICINE

## 2021-12-21 PROCEDURE — 85610 PROTHROMBIN TIME: CPT | Performed by: INTERNAL MEDICINE

## 2021-12-21 PROCEDURE — 80053 COMPREHEN METABOLIC PANEL: CPT | Performed by: INTERNAL MEDICINE

## 2021-12-21 PROCEDURE — 82948 REAGENT STRIP/BLOOD GLUCOSE: CPT

## 2021-12-21 PROCEDURE — G1004 CDSM NDSC: HCPCS

## 2021-12-21 PROCEDURE — 73718 MRI LOWER EXTREMITY W/O DYE: CPT

## 2021-12-21 PROCEDURE — 99232 SBSQ HOSP IP/OBS MODERATE 35: CPT | Performed by: INTERNAL MEDICINE

## 2021-12-21 PROCEDURE — 85025 COMPLETE CBC W/AUTO DIFF WBC: CPT | Performed by: INTERNAL MEDICINE

## 2021-12-21 PROCEDURE — 90935 HEMODIALYSIS ONE EVALUATION: CPT | Performed by: INTERNAL MEDICINE

## 2021-12-21 RX ORDER — ALPRAZOLAM 0.25 MG/1
0.25 TABLET ORAL 4 TIMES DAILY PRN
Status: DISCONTINUED | OUTPATIENT
Start: 2021-12-21 | End: 2022-01-12 | Stop reason: HOSPADM

## 2021-12-21 RX ORDER — GABAPENTIN 100 MG/1
100 CAPSULE ORAL 3 TIMES DAILY
Status: DISCONTINUED | OUTPATIENT
Start: 2021-12-21 | End: 2022-01-12 | Stop reason: HOSPADM

## 2021-12-21 RX ORDER — AMMONIUM LACTATE 12 G/100G
CREAM TOPICAL 2 TIMES DAILY PRN
Status: DISCONTINUED | OUTPATIENT
Start: 2021-12-21 | End: 2022-01-12 | Stop reason: HOSPADM

## 2021-12-21 RX ADMIN — INSULIN LISPRO 1 UNITS: 100 INJECTION, SOLUTION INTRAVENOUS; SUBCUTANEOUS at 16:14

## 2021-12-21 RX ADMIN — ALPRAZOLAM 0.25 MG: 0.25 TABLET ORAL at 22:07

## 2021-12-21 RX ADMIN — LEVOTHYROXINE SODIUM 50 MCG: 50 TABLET ORAL at 05:33

## 2021-12-21 RX ADMIN — NYSTATIN: 100000 POWDER TOPICAL at 16:12

## 2021-12-21 RX ADMIN — INSULIN GLARGINE 20 UNITS: 100 INJECTION, SOLUTION SUBCUTANEOUS at 22:14

## 2021-12-21 RX ADMIN — PANTOPRAZOLE SODIUM 40 MG: 40 TABLET, DELAYED RELEASE ORAL at 05:33

## 2021-12-21 RX ADMIN — OXYCODONE HYDROCHLORIDE AND ACETAMINOPHEN 1 TABLET: 5; 325 TABLET ORAL at 20:16

## 2021-12-21 RX ADMIN — WARFARIN SODIUM 9 MG: 7.5 TABLET ORAL at 16:13

## 2021-12-21 RX ADMIN — POLYETHYLENE GLYCOL 3350 17 G: 17 POWDER, FOR SOLUTION ORAL at 12:48

## 2021-12-21 RX ADMIN — CARVEDILOL 25 MG: 25 TABLET, FILM COATED ORAL at 16:12

## 2021-12-21 RX ADMIN — LORATADINE 10 MG: 10 TABLET ORAL at 12:48

## 2021-12-21 RX ADMIN — HYDRALAZINE HYDROCHLORIDE 5 MG: 20 INJECTION INTRAMUSCULAR; INTRAVENOUS at 00:47

## 2021-12-21 RX ADMIN — GABAPENTIN 100 MG: 100 CAPSULE ORAL at 18:08

## 2021-12-21 RX ADMIN — SEVELAMER HYDROCHLORIDE 1600 MG: 800 TABLET, FILM COATED PARENTERAL at 07:49

## 2021-12-21 RX ADMIN — SERTRALINE HYDROCHLORIDE 50 MG: 50 TABLET ORAL at 12:48

## 2021-12-21 RX ADMIN — ALPRAZOLAM 0.25 MG: 0.25 TABLET ORAL at 13:32

## 2021-12-21 RX ADMIN — Medication 3 MG: at 22:07

## 2021-12-21 RX ADMIN — INSULIN LISPRO 3 UNITS: 100 INJECTION, SOLUTION INTRAVENOUS; SUBCUTANEOUS at 16:14

## 2021-12-21 RX ADMIN — CEFAZOLIN SODIUM 1000 MG: 1 SOLUTION INTRAVENOUS at 02:17

## 2021-12-21 RX ADMIN — OXYCODONE HYDROCHLORIDE AND ACETAMINOPHEN 1 TABLET: 5; 325 TABLET ORAL at 12:49

## 2021-12-21 RX ADMIN — SEVELAMER HYDROCHLORIDE 1600 MG: 800 TABLET, FILM COATED PARENTERAL at 16:12

## 2021-12-21 RX ADMIN — INSULIN LISPRO 3 UNITS: 100 INJECTION, SOLUTION INTRAVENOUS; SUBCUTANEOUS at 12:48

## 2021-12-21 RX ADMIN — INSULIN LISPRO 1 UNITS: 100 INJECTION, SOLUTION INTRAVENOUS; SUBCUTANEOUS at 12:48

## 2021-12-21 RX ADMIN — ALPRAZOLAM 0.25 MG: 0.25 TABLET ORAL at 07:49

## 2021-12-21 RX ADMIN — SEVELAMER HYDROCHLORIDE 1600 MG: 800 TABLET, FILM COATED PARENTERAL at 12:47

## 2021-12-21 RX ADMIN — VANCOMYCIN HYDROCHLORIDE 1750 MG: 10 INJECTION, POWDER, LYOPHILIZED, FOR SOLUTION INTRAVENOUS at 14:36

## 2021-12-21 RX ADMIN — Medication: at 22:07

## 2021-12-21 RX ADMIN — OXYCODONE HYDROCHLORIDE AND ACETAMINOPHEN 1 TABLET: 5; 325 TABLET ORAL at 00:47

## 2021-12-21 NOTE — PROGRESS NOTES
Pastoral Care Progress Note    2021  Patient: Caitlin Antonio : 1965  Admission Date & Time: 2021 1507  MRN: [id number] CSN: [id number]           visited pt to provide anxiety containment and focus on present  Will follow             Chaplaincy Interventions Utilized:   Empowerment: Clarified, confirmed, or reviewed information from treatment team , Encouraged focus on present, Provided anxiety containment, Provided grief counseling and Reframed experience of patient/family    Exploration: Explored emotional needs & resources  Relationship Building: Cultivated a relationship of care and support and Listened empathically  Ritual: Provided ritual  Read poem     21 1500   Clinical Encounter Type   Visited With Patient and family together   Routine Visit Introduction   Referral From Nurse

## 2021-12-21 NOTE — ASSESSMENT & PLAN NOTE
Continue with Coumadin  INR 2 9 this morning, will continue to trend and may need to adjust dose if trending, for now continue warfarin 9 mg daily

## 2021-12-21 NOTE — PLAN OF CARE
Problem: Potential for Falls  Goal: Patient will remain free of falls  Description: INTERVENTIONS:  - Educate patient/family on patient safety including physical limitations  - Instruct patient to call for assistance with activity   - Consult OT/PT to assist with strengthening/mobility   - Keep Call bell within reach  - Keep bed low and locked with side rails adjusted as appropriate  - Keep care items and personal belongings within reach  - Initiate and maintain comfort rounds  - Make Fall Risk Sign visible to staff  -- Consider moving patient to room near nurses station  Outcome: Progressing

## 2021-12-21 NOTE — ASSESSMENT & PLAN NOTE
Lab Results   Component Value Date    HGBA1C 7 9 (H) 09/30/2021       Recent Labs     12/20/21  2227 12/21/21  0748   POCGLU 237* 153*       Blood Sugar Average: Last 72 hrs:  (P) 195 likely contributing to infection  Will continue on 20 units Lantus at bedtime, 3 units with meals

## 2021-12-21 NOTE — CONSULTS
Podiatry - Consultation    Patient Information:   Cam Meeks 49 y o  female MRN: [id number]  Unit/Bed#: ED 28 Encounter: [id number]  PCP: Mickey Marie MD  Date of Admission:  12/20/2021  Date of Consultation: 12/20/21  Requesting Physician: Bernice Vasquez DO      ASSESSMENT:    Cam Meeks is a 49 y o  female with:    1  Right medial 1st MTPJ diabetic ulceration - [place] [address] - POA  2  T2DM  3  ESRD on HD  4  Obesity    PLAN:    · Recommended inpatient admission given clinical osteomyelitis with erosive changes noted on x-ray as described below  Patient will likely require partial 1st ray amputation at a minimum pending results of MRI  Patient understands that she has that extremely high risk for amputation/limb loss given her comorbidities  · X-ray reviewed by myself:  Extensive cortical erosion noted at medial 1st metatarsal head, significantly different from x-rays taken 09/28/2021  This likely represents osteomyelitis  · Follow-up MRI  · Follow-up wound cultures  · Labs reviewed, ESR and CRP elevated  · Recommend nephrology consult, patient receives HD on a Tuesday, Thursday, Saturday schedule  · Recommend Infectious Disease consult for antibiotic dosing given renal function and history of resistant organisms  · Local wound care consisting of Adaptic, DSD  Wound care instructions placed  Appreciate nursing staff for assistance with dressing changes  · Elevation and offloading on green foam wedges or pillows when non-ambulatory  · Rest of care per primary team   · Will discuss this plan with my attending and update as needed  Weightbearing status:  Weight-bearing as tolerated right foot    SUBJECTIVE:    History of Present Illness:    Cam Meeks is a 49 y o  female who is originally admitted 12/20/2021 due to right foot ulceration  Patient has a past medical history of T2 DM, ESRD on HD, obesity, hyperlipidemia, hypertension, glycol my, anxiety, GERD, anemia of chronic disease, cardiomyopathy      We are consulted for evaluation and management of the patient's right foot  The patient states that over the past 3-4 days her right foot has become increasingly painful, red, swollen  She states that about 4 days ago, and every day since, she has noted pus draining out of her wound  She describes the pain as burning and throbbing and states that it is painful to walk on her right foot  She does have visiting nurses who just changed her dressings today, 2021  Additionally she follows outpatient in Wound Care with Dr Tatiana Montiel, she was recently seen 5 days ago  The patient does state that yesterday as well as the day before she was experiencing chills  Review of Systems:    Constitutional: Chills and fever  HENT: Negative  Eyes: Negative  Respiratory: Negative  Cardiovascular: Negative  Gastrointestinal: Negative  Musculoskeletal:  Right foot pain   Skin:  Right foot ulceration   Neurological:  Peripheral neuropathy   Psych: Negative       Past Medical and Surgical History:     Past Medical History:   Diagnosis Date    Abnormal uterine bleeding (AUB)     Anemia     Anxiety     Arthritis     Chronic kidney disease     Claustrophobia     [facility]     Disease of thyroid gland     DVT (deep venous thrombosis) (HCC)     End stage kidney disease (HCC)     Foot ulcer due to secondary DM ([facility]     Hemodialysis patient ([facility] )     Tues, Thurs, Sat    Hypertension     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[facility] )     right eye    Morbid obesity ([facility]     Osteomyelitis of fifth toe of right foot (HCC)     Panic attacks     Pulmonary embolism (HCC)     Reflux esophagitis     Thrombophlebitis of saphenous vein     Warfarin anticoagulation        Past Surgical History:   Procedure Laterality Date    AMPUTATION      r 4th toe    ARTERIOVENOUS GRAFT PLACEMENT      CATARACT EXTRACTION Bilateral     CERVICAL BIOPSY  W/ LOOP ELECTRODE EXCISION       SECTION  DIALYSIS FISTULA CREATION      DILATION AND CURETTAGE OF UTERUS      ENDOMETRIAL ABLATION W/ NOVASURE      EYE SURGERY      HYSTERECTOMY      @ age 57 (complete)    IR AV FISTULAGRAM/GRAFTOGRAM  10/11/2019    IR AV FISTULAGRAM/GRAFTOGRAM  8/12/2020    IR AV FISTULAGRAM/GRAFTOGRAM  12/23/2020    IR NON-TUNNELED CENTRAL LINE PLACEMENT  6/29/2021    IR NON-TUNNELED CENTRAL LINE PLACEMENT  10/4/2021    OOPHORECTOMY Bilateral     @ age 57    PERICARDIUM SURGERY      TX AMPUTATION TOE,MT-P JT Right 5/28/2020    Procedure: AMPUTATION TOE- 5th;  Surgeon: Ganga Garcia DPM;  [facility];  Service: Podiatry    TX COLONOSCOPY FLX DX W/COLLJ [address] PFRMD N/A 3/13/2019    Procedure: COLONOSCOPY;  Surgeon: Lety Yang MD;  [facility]; Service: Gastroenterology    TX ESOPHAGOGASTRODUODENOSCOPY TRANSORAL DIAGNOSTIC N/A 3/13/2019    Procedure: ESOPHAGOGASTRODUODENOSCOPY (EGD); Surgeon: Lety Yang MD;  [facility];   Service: Gastroenterology    TX LAPAROSCOPY W TOT HYSTERECT UTERUS 250 GRAM OR LESS N/A 2/16/2016    Procedure: HYSTERECTOMY LAPAROSCOPIC TOTAL [facility]), with bilateral salpingectomy;  Surgeon: Aly Suresh DO;  [facility];  Service: Gynecology    THROMBECTOMY / ARTERIOVENOUS GRAFT REVISION      TOE SURGERY Right     removal of the 4th toe    WOUND DEBRIDEMENT Right 10/8/2021    Procedure: R 1st MTPJ washout ;  Surgeon: Dada Gu DPM;  [facility];  Service: Podiatry       Meds/Allergies:    (Not in a hospital admission)      Allergies   Allergen Reactions    Iodinated Diagnostic Agents Itching       Social History:     Marital Status: Single    Substance Use History:   Social History     Substance and Sexual Activity   Alcohol Use Never    Alcohol/week: 0 0 standard drinks     Social History     Tobacco Use   Smoking Status Never Smoker   Smokeless Tobacco Never Used     Social History     Substance and Sexual Activity   Drug Use No Family History:    Family History   Problem Relation Age of Onset    Heart disease Family     Diabetes Family     Heart disease Mother     Cancer Brother         neck    Throat cancer Brother     Ovarian cancer Maternal Aunt 40         OBJECTIVE:    Vitals:   Blood Pressure: (!) 191/71 (12/20/21 1437)  Pulse: 74 (12/20/21 1437)  Temperature: 98 3 °F (36 8 °C) (12/20/21 1437)  Temp Source: Oral (12/20/21 1437)  Respirations: 18 (12/20/21 1437)  Height: 5' 6" (167 6 cm) (12/20/21 1437)  Weight - Scale: 122 kg (268 lb 15 4 oz) (12/20/21 1437)  SpO2: 95 % (12/20/21 1437)    Physical Exam:    General Appearance: Alert, cooperative, no distress  HEENT: Head normocephalic, atraumatic, without obvious abnormality  Heart: Normal rate and rhythm  Lungs: Non-labored breathing  No respiratory distress  Abdomen: Without distension  Psychiatric: AAOx3  Lower Extremity:  Vascular:   Right DP and PT pulses are present  Left DP and PT pulses are present  CRT < 3 seconds at the digits  +2/4 edema noted at bilateral lower extremities  Pedal hair is present  Skin temperature is warm to the right foot in comparison to the left  Musculoskeletal:  MMT is 4/5 in all muscle compartments bilaterally  ROM at the 1st MPJ and ankle joint are decreased bilaterally with the leg extended  Pain on palpation of the right forefoot  Dermatological:  Lower extremity wound(s) as noted below:    Wound #: 1  Location:  Right medial 1st MTPJ  Length 1 0 cm: Width 1 0 cm: Depth 1 0 cm:   Deepest Tissue Noted in Base:  Bone  Probe to Bone: Yes  Peripheral Skin Description: Attached, rolled, hyperkeratotic  Additionally, surrounding skin is erythematous, warm, edematous  Granulation: 70% Fibrotic Tissue: 30% Necrotic Tissue: 0%   Drainage Amount: minimal, serosanguinous, purulent  Signs of Infection: Yes      Neurological:  Gross sensation is intact  Protective sensation is diminished   Patient Reports numbness and/or paresthesias  Clinical Images 12/20/21:          Additional data:     Lab Results: I have personally reviewed pertinent labs including:    Results from last 7 days   Lab Units 12/20/21  1619   WBC Thousand/uL 6 46   HEMOGLOBIN g/dL 7 8*   HEMATOCRIT % 24 1*   PLATELETS Thousands/uL 195   NEUTROS PCT % 68   LYMPHS PCT % 18   MONOS PCT % 11   EOS PCT % 2     Results from last 7 days   Lab Units 12/20/21  1619   POTASSIUM mmol/L 4 4   CHLORIDE mmol/L 103   CO2 mmol/L 26   BUN mg/dL 44*   CREATININE mg/dL 7 78*   CALCIUM mg/dL 9 0           Cultures: I have personally reviewed pertinent cultures including:              Imaging: I have personally reviewed pertinent reports in PACS  EKG, Pathology, and Other Studies: I have personally reviewed pertinent reports  Time Spent for Care: 30 minutes  More than 50% of total time spent on counseling and coordination of care as described above  ** Please Note: Portions of the record may have been created with voice recognition software  Occasional wrong word or "sound a like" substitutions may have occurred due to the inherent limitations of voice recognition software  Read the chart carefully and recognize, using context, where substitutions have occurred   **

## 2021-12-21 NOTE — PLAN OF CARE
Received pt on a wheelchair, alert and oriented and verbalized feelings of sadness towards upcoming amputation surgery  Started on HD tx with UF goal of 3L x 3 5 hours x 3K bath for serum k+ 4 4 12/20/21  Post-Dialysis RN Treatment Note    Blood Pressure:  Pre 175/56 mm/Hg  Post 175/51 mmHg   EDW  121 kg    Weight:  Pre 123 7 kg   Post 121 7 kg   Mode of weight measurement: Standing Scale   Volume Removed  1781 ml    Treatment duration 150 minutes    NS given  No    Treatment shortened? Yes, describe: 1 hour earlier as per pt request d/t emotional stress re upcoming amputation surgery     Medications given during Rx None Reported   Estimated Kt/V  0 84   Access type: AV fistula at left forearm   Access Issues: No    Report called to primary nurse   Yes Savannah Hernandez RN      Problem: METABOLIC, FLUID AND ELECTROLYTES - ADULT  Goal: Electrolytes maintained within normal limits  Description: INTERVENTIONS:  - Monitor labs and assess patient for signs and symptoms of electrolyte imbalances  - Administer electrolyte replacement as ordered  - Monitor response to electrolyte replacements, including repeat lab results as appropriate  - Instruct patient on fluid and nutrition as appropriate  Outcome: Progressing  Goal: Fluid balance maintained  Description: INTERVENTIONS:  - Monitor labs   - Monitor I/O and WT  - Instruct patient on fluid and nutrition as appropriate  - Assess for signs & symptoms of volume excess or deficit  Outcome: Progressing

## 2021-12-21 NOTE — ASSESSMENT & PLAN NOTE
Lab Results   Component Value Date    EGFR 5 12/20/2021    EGFR 4 11/25/2021    EGFR 5 11/24/2021    CREATININE 7 78 (H) 12/20/2021    CREATININE 9 37 (H) 11/25/2021    CREATININE 7 81 (H) 11/24/2021   Consult Nephrology

## 2021-12-21 NOTE — H&P
[facility]  H&P- Caitlin Antonio 1965, 49 y o  female MRN: [id number]  Unit/Bed#: ED 28 Encounter: [id number]  Primary Care Provider: Simeon Aguirre MD   Date and time admitted to hospital: 12/20/2021  3:07 PM    * Foot ulcer, right [facility])  Assessment & Plan  Patient was seen in the ED yesterday instructed to follow-up with wound care as an outpatient  However, she returns today with concern for worsening infection  She was discharged on Keflex yesterday but did not fill the antibiotic script  There was concerned that the wound was probing to bone as well  Today she presents with elevated sed rate and concern for elevated CRP/sed rate  Follow up on x-ray  Continue with antibiotic therapy  Monitor symptoms    Hypertension  Assessment & Plan  Continue antihypertensive agents  Adalat, Coreg      P r n  hydralazine    Type 2 diabetes mellitus with neurologic complication, with long-term current use of insulin (HCC)  Assessment & Plan  Lab Results   Component Value Date    HGBA1C 7 9 (H) 09/30/2021       No results for input(s): POCGLU in the last 72 hours  Blood Sugar Average: Last 72 hrs:   likely contributing to infection    Hyperlipemia  Assessment & Plan  Lipitor    ESRD on hemodialysis [facility])  Assessment & Plan  Lab Results   Component Value Date    EGFR 5 12/20/2021    EGFR 4 11/25/2021    EGFR 5 11/24/2021    CREATININE 7 78 (H) 12/20/2021    CREATININE 9 37 (H) 11/25/2021    CREATININE 7 81 (H) 11/24/2021   Consult Nephrology    History of DVT (deep vein thrombosis)/PE  Assessment & Plan  Continue with Coumadin  Follow-up with INR    VTE Prophylaxis: Heparin  / sequential compression device   Code Status:  Full code      Anticipated Length of Stay:  Patient will be admitted on an Inpatient basis with an anticipated length of stay of  greater 2 midnights     Justification for Hospital Stay:  Needs further evaluation of symptoms    Total Time for Visit, including Counseling / Coordination of Care: 45 minutes  Greater than 50% of this total time spent on direct patient counseling and coordination of care  Chief Complaint:   Foot ulcer    History of Present Illness:    Sabino Mercedes is a 49 y o  female who presents with reported right-sided foot ulcer  Patient presented yesterday with the wound  She was instructed to follow-up with wound care as an outpatient  She was discharged on Keflex at that time  However she returns to the ED with concern for worsening infection  She reportedly did not fill her Keflex script though  She has past medical history of diabetic neuropathy and cardiomyopathy history in addition to end-stage renal disease  She presents the hospital with concern for wound probing to bone with possible osteomyelitis  She presents with right foot pain over last 4 days with increasing redness swelling on the right side  She was sent back to the ED by visiting nurses for evaluation of her wound  Evaluation by Podiatry in ED concerning for osteomyelitis  Podiatry had recommended to have inpatient admission for continued antibiotic therapy and further follow through on wound care  She presents with chills and subjective fever   Review of Systems:    Review of Systems   Constitutional: Positive for chills and fatigue  Respiratory: Negative for chest tightness and shortness of breath  Cardiovascular: Negative for chest pain         Past Medical and Surgical History:     Past Medical History:   Diagnosis Date    Abnormal uterine bleeding (AUB)     Anemia     Anxiety     Arthritis     Chronic kidney disease     Claustrophobia     Diabetes mellitus [facility]     Disease of thyroid gland     DVT (deep venous thrombosis) (HCC)     End stage kidney disease (HCC)     Foot ulcer due to secondary DM ([facility] [facility] )     [email or web address] )     Tues, Thurs, Sat    Hypertension     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     right eye    Morbid obesity ([address] )     Osteomyelitis of fifth toe of right foot (HCC)     Panic attacks     Pulmonary embolism (HCC)     Reflux esophagitis     Thrombophlebitis of saphenous vein     Warfarin anticoagulation        Past Surgical History:   Procedure Laterality Date    AMPUTATION      r 4th toe    ARTERIOVENOUS GRAFT PLACEMENT      CATARACT EXTRACTION Bilateral     CERVICAL BIOPSY  W/ LOOP ELECTRODE EXCISION       SECTION      DIALYSIS FISTULA CREATION      DILATION AND CURETTAGE OF UTERUS      ENDOMETRIAL ABLATION W/ NOVASURE      EYE SURGERY      HYSTERECTOMY      @ age 57 (complete)    IR AV FISTULAGRAM/GRAFTOGRAM  10/11/2019    IR AV FISTULAGRAM/GRAFTOGRAM  2020    IR AV FISTULAGRAM/GRAFTOGRAM  2020    IR NON-TUNNELED CENTRAL LINE PLACEMENT  2021    IR NON-TUNNELED CENTRAL LINE PLACEMENT  10/4/2021    OOPHORECTOMY Bilateral     @ age 57    PERICARDIUM SURGERY      MD AMPUTATION TOE,MT-P JT Right 2020    Procedure: AMPUTATION TOE- 5th;  Surgeon: Duke Garcia DPM;  [facility];  Service: Podiatry    MD COLONOSCOPY FLX DX W/COLLJ SPEC WHEN PFRMD N/A 3/13/2019    Procedure: COLONOSCOPY;  Surgeon: Savanah Hammer MD;  [facility]; Service: Gastroenterology    MD ESOPHAGOGASTRODUODENOSCOPY TRANSORAL DIAGNOSTIC N/A 3/13/2019    Procedure: ESOPHAGOGASTRODUODENOSCOPY (EGD); Surgeon: Savanah Hammer MD;  [facility];   Service: Gastroenterology    MD LAPAROSCOPY W TOT HYSTERECT UTERUS 250 GRAM OR LESS N/A 2016    Procedure: HYSTERECTOMY LAPAROSCOPIC TOTAL [facility]), with bilateral salpingectomy;  Surgeon: Taz Jaeger DO;  [facility];  Service: Gynecology    THROMBECTOMY / ARTERIOVENOUS GRAFT REVISION      TOE SURGERY Right     removal of the 4th toe    WOUND DEBRIDEMENT Right 10/8/2021    Procedure: R 1st MTPJ washout ;  Surgeon: Ronnie Pearce DPM;  [facility];  Service: Podiatry       Meds/Allergies:    Prior to Admission medications    Medication Sig Start Date End Date Taking?  Authorizing Provider   Accu-Chek Guide test strip use to TEST BLOOD SUGAR two to three times a day 7/5/21   Historical Provider, MD   Accu-Chek Softclix Lancets lancets 3 (three) times a day Use to test blood sugar 12/30/20   Historical Provider, MD   acetaminophen (TYLENOL) 325 mg tablet Take 2 tablets (650 mg total) by mouth every 6 (six) hours as needed for mild pain or fever  Patient not taking: Reported on 10/27/2021 2/20/20   Dyan Sanchez MD   albuterol (ProAir HFA) 90 mcg/act inhaler Inhale 2 puffs every 6 (six) hours as needed for wheezing or shortness of breath  Patient not taking: Reported on 10/27/2021 7/3/21   Darela Aly,    ALPRAZolam Jimmy Lions) 0 25 mg tablet Take 0 25 mg by mouth 2 (two) times a day as needed for anxiety    Historical Provider, MD   ammonium lactate (LAC-HYDRIN) 12 % cream Apply topically 2 (two) times a day 11/13/21   Darryn Bates MD   atorvastatin (LIPITOR) 20 mg tablet Take 20 mg by mouth every evening  4/24/18   Historical Provider, MD   B Complex-C-Folic Acid (Natalia-Denys) TABS Take 1 tablet by mouth daily 7/6/21   Historical Provider, MD   carvedilol (COREG) 25 mg tablet Take 25 mg by mouth 2 (two) times a day with meals      Historical Provider, MD   cephalexin (KEFLEX) 500 mg capsule Take 1 capsule (500 mg total) by mouth every 12 (twelve) hours for 7 days 12/19/21 12/26/21  Irma Wood MD   cinacalcet (SENSIPAR) 30 mg tablet Take 30 mg by mouth daily    Historical Provider, MD   insulin glargine (Lantus SoloStar) 100 units/mL injection pen Inject 20 Units under the skin daily at bedtime 11/13/21   MD Mara [place] 15 GM/60ML suspension Take by mouth Takes as a shake on dialysis days Tues-Thurs_Sat 12/10/18   Historical Provider, MD   levothyroxine 50 mcg tablet Take 50 mcg by mouth daily    Historical Provider, MD   loratadine (CLARITIN) 10 mg tablet Take 10 mg by mouth daily    Historical Provider, MD   melatonin 3 mg Take 1 tablet (3 mg total) by mouth daily at bedtime 11/13/21   Jah Bird MD   metoclopramide (REGLAN) 10 mg/10 mL oral solution Take 5 mL (5 mg total) by mouth every 6 (six) hours as needed (nausea) 11/26/21   Eva Baker MD   NIFEdipine (ADALAT CC) 30 MG 24 hr tablet Take 1 tablet (30 mg total) by mouth daily 6/27/21   Jah Bird MD   NOVOLOG FLEXPEN 100 units/mL SOPN INJECT 1 TO 5 UNITS SUBCUTANEOUSLY THREE TIMES A DAY BEFORE MELAS AS PER SLIDING SCALE 4/30/20   Historical Provider, MD   nystatin (MYCOSTATIN) powder Apply topically 2 (two) times a day 8/31/19   Benito Baptiste MD   oxyCODONE-acetaminophen (PERCOCET) 5-325 mg per tablet Take 1 tablet by mouth every 6 (six) hours as needed for moderate pain for up to 10 dosesMax Daily Amount: 4 tablets 10/18/21   Ginette Humphrey DO   pantoprazole (PROTONIX) 40 mg tablet take 1 tablet by mouth twice a day 10/11/21   Historical Provider, MD   polyethylene glycol (MIRALAX) 17 g packet Take 17 g by mouth daily for 7 days 11/13/21 11/20/21  Jah Bird MD   polyethylene glycol (MIRALAX) 17 g packet Take 17 g by mouth daily 12/9/21 6/7/22  Law He MD   senna (SENOKOT) 8 6 mg Take 2 tablets (17 2 mg total) by mouth daily at bedtime as needed for constipation 2/21/20   LOIS Mcgarry   sertraline (ZOLOFT) 50 mg tablet Take 1 tablet (50 mg total) by mouth daily 4/28/19   LOIS [place]   sevelamer (RENAGEL) 800 mg tablet Take 2 tablets (1,600 mg total) by mouth 3 (three) times a day with meals 11/13/21   Jah Bird MD   torsemide [facility]) 100 mg tablet Take 1 tablet (100 mg total) by mouth 4 (four) times a week On Sunday, Monday, Wednesday, Friday 11/13/21   Jah Bird MD   warfarin (COUMADIN) 3 mg tablet Take 3 tablets (9 mg total) by mouth daily Takes 9 mg Monday Sat 6/11/21   LOIS Cheema     I have reviewed home medications with patient personally      Allergies: Allergies   Allergen Reactions    Iodinated Diagnostic Agents Itching       Social History:     Marital Status: Single   Patient Pre-hospital Living Situation:  Home  Patient Pre-hospital Level of Mobility:  Ambulate  Patient Pre-hospital Diet Restrictions:  Denies  Substance Use History:   Social History     Substance and Sexual Activity   Alcohol Use Never    Alcohol/week: 0 0 standard drinks     Social History     Tobacco Use   Smoking Status Never Smoker   Smokeless Tobacco Never Used     Social History     Substance and Sexual Activity   Drug Use No       Family History:    Family History   Problem Relation Age of Onset    Heart disease Family     Diabetes Family     Heart disease Mother     Cancer Brother         neck    Throat cancer Brother     Ovarian cancer Maternal Aunt 40       Physical Exam:     Vitals:   Blood Pressure: (!) 180/73 (12/20/21 2006)  Pulse: 76 (12/20/21 2006)  Temperature: 98 3 °F (36 8 °C) (12/20/21 1437)  Temp Source: Oral (12/20/21 1437)  Respirations: 18 (12/20/21 2006)  Height: 5' 6" (167 6 cm) (12/20/21 1437)  Weight - Scale: 122 kg (268 lb 15 4 oz) (12/20/21 1437)  SpO2: 95 % (12/20/21 2006)    Physical Exam  Constitutional:       Appearance: Normal appearance  HENT:      Head: Normocephalic and atraumatic  Cardiovascular:      Rate and Rhythm: Normal rate and regular rhythm  Pulses: Normal pulses  Pulmonary:      Effort: Pulmonary effort is normal       Breath sounds: Normal breath sounds  Abdominal:      General: Abdomen is flat  Palpations: Abdomen is soft  Neurological:      General: No focal deficit present  Mental Status: She is alert and oriented to person, place, and time  Additional Data:     Lab Results: I have personally reviewed pertinent reports        Results from last 7 days   Lab Units 12/20/21  1619   WBC Thousand/uL 6 46   HEMOGLOBIN g/dL 7 8*   HEMATOCRIT % 24 1*   PLATELETS Thousands/uL 195   NEUTROS PCT % 68   LYMPHS PCT % 18   MONOS PCT % 11   EOS PCT % 2     Results from last 7 days   Lab Units 12/20/21  1619   SODIUM mmol/L 136   POTASSIUM mmol/L 4 4   CHLORIDE mmol/L 103   CO2 mmol/L 26   BUN mg/dL 44*   CREATININE mg/dL 7 78*   ANION GAP mmol/L 7   CALCIUM mg/dL 9 0   GLUCOSE RANDOM mg/dL 176*                       Imaging: I have personally reviewed pertinent reports  XR foot 3+ views RIGHT    (Results Pending)   MRI inpatient order    (Results Pending)         ** Please Note: This note has been constructed using a voice recognition system   **

## 2021-12-21 NOTE — CASE MANAGEMENT
Case Management Assessment    Patient name Hilda Norwood  Location [facility]01 MRN [id number]  : 1965 Date 2021       Current Admission Date: 2021  Current Admission Diagnosis:Foot ulcer, right [facility])   Patient Active Problem List    Diagnosis Date Noted    [facility] ulcer, right ([facility] ) 2021    Pruritus 2021    Postop check 2021    Sigmoid diverticulitis 2021    Pneumonia 2021    Chronic anticoagulation 2021    Hypertension 2021    Arteriovenous fistula for hemodialysis in place, primary [facility]) 2021    Healthcare-associated pneumonia 2021    Right foot pain 2021    A-V fistula ([facility] ) 2021    Chronic pain syndrome 2021    Bilateral primary osteoarthritis of knee 2021    Bilateral patellofemoral syndrome 2021    Tinea unguium 2020    S/P foot surgery, right 2020    History of claustrophobia 2020    Morbid obesity ([facility] ) 2020    Hyperlipemia 2020    Diabetic polyneuropathy associated with type 2 diabetes mellitus ([facility] ) 2020    Encounter for diabetic foot exam ([facility] ) 2020    Corns and callosities 2020    History of amputation of lesser toe of right foot ([facility] ) 2020    Flu-like symptoms 2020    Lumbar radiculopathy 2020    Low back pain with sciatica 2020    Hemodialysis-associated hypotension 2019    Hyponatremia 2019    Parenchymal renal hypertension 2019    Candidiasis of breast 2019    Anemia 2019    Disruption of internal surgical wound 2019    Dyspnea 2019    Secondary hyperparathyroidism of renal origin ([facility] ) 2019    Nausea and vomiting 2019    Meningioma ([facility] ) 2019    Concussion without loss of consciousness 03/15/2019    Colon cancer screening 2019    Gastroesophageal reflux disease 2019    Primary osteoarthritis of right knee 10/25/2018    Hemodialysis status ([facility] ) 10/23/2018    Knee pain 10/22/2018    Ambulatory dysfunction 10/22/2018    Anxiety disorder 04/06/2017    Acquired hypothyroidism 07/22/2016    Glaucoma 07/01/2016    ESRD on hemodialysis ([facility] ) 07/01/2016    Abnormal uterine bleeding 02/15/2016    History of DVT (deep vein thrombosis)/PE 02/14/2016    Pulmonary embolus ([facility] ) 10/30/2015    Cervical dysplasia 05/03/2015    Chronic endometritis 10/17/2014    Tinea pedis 10/02/2014    Benign neoplasm of skin 09/25/2014    Type 2 diabetes mellitus with neurologic complication, with long-term current use of insulin ([facility] ) 09/04/2014    Phlebitis and thrombophlebitis of superficial vessels of lower extremities 04/10/2014    Limb pain 11/25/2013    Mononeuritis of upper limb, unspecified laterality 11/25/2013    Legal blindness, as defined in [place] 97/19/2665    Complication from renal dialysis device 04/22/2013    Essential hypertension 10/15/2012    Cardiomyopathy ([facility] ) 09/26/2012    Esophageal reflux 08/20/2012    Allergic rhinitis 08/20/2012      LOS (days): 1  Geometric Mean LOS (GMLOS) (days): 2 90  Days to GMLOS:2     OBJECTIVE:  PATIENT READMITTED TO HOSPITAL  Risk of Unplanned Readmission Score: 67         Current admission status: Inpatient       Preferred Pharmacy:   [facility]/pharmacy #[id number] RALPH Pur[place] - [address] [facility]  [address]  [address] [address]  Phone: [phone] Fax: [phone] [facility], [address] [facility] [address]  [address] [facility] [address] [address]  Phone: [phone] Fax: [phone]    Primary Care Provider: Amber Jackson MD    Primary Insurance: MEDICARE  Secondary Insurance: [address]    ASSESSMENT:  [address], [address] Representative - Significant Other   Primary Phone: [phone] [facility]  Home Phone: [phone]                  Patient Information  Admitted from[de-identified] Home  Mental Status: Alert  During Assessment patient was accompanied by: Other-Comment (SO)  Assessment information provided by[de-identified] Patient  Primary Caregiver: Self  Caregiver's Name[de-identified] Rollene Marrow Relationship to Patient[de-identified] Significant Other  Support Systems: Spouse/significant other  [place] of Residence: [address] [place] [address] do you live in?: [address] entry access options   Select all that apply : Stairs  Number of steps to enter home : One Flight ([address])  Type of Current Residence: [address] [place] [address]  Upon entering residence, is there a bedroom on the main floor (no further steps)?: No  A bedroom is located on the following floor levels of residence (select all that apply):: 2nd [address] Floor: Lives w/ Spouse/significant other    Activities of Daily Living Prior to Admission  Functional Status: Independent  Completes ADLs independently?: Yes  Ambulates independently?: Yes  Does patient use assisted devices?: Yes  Assisted Devices (DME) used: Cat Santillan  Does patient currently own DME?: Yes  What DME does the patient currently own?: Cat Santillan  Does patient have a history of Outpatient Therapy (PT/OT)?: No  Does the patient have a history of Short-Term Rehab?: No  Does patient have a history of HHC?: Yes ([facility])  [facility]: Yes    Current Home Health Care  Type of Current Home Care Services: Nurse visit  [address][de-identified] [address] [facility] Provider[de-identified] PCP    Patient Information Continued  Income Source: SSI/SSD  Does patient receive dialysis treatments?: Yes (Dakota Lemus Tuesday Thursday Saturday)  Does patient have a history of substance abuse?: No  Does patient have a history of Mental Health Diagnosis?: Yes (Anxiety)  Has patient received inpatient treatment related to mental health in the last 2 years?: No      Means of Transportation  Means of Transport to [facility][de-identified] Family transport

## 2021-12-21 NOTE — ASSESSMENT & PLAN NOTE
Continue antihypertensive agents  -Coreg 25 mg b i d   -Procardia 30 mg daily, may need to consider increasing 60 mg as patient's blood pressure is elevated      P r n  hydralazine

## 2021-12-21 NOTE — ED ATTENDING ATTESTATION
12/20/2021  ITessie MD, saw and evaluated the patient  I have discussed the patient with the resident/non-physician practitioner and agree with the resident's/non-physician practitioner's findings, Plan of Care, and MDM as documented in the resident's/non-physician practitioner's note, except where noted  All available labs and Radiology studies were reviewed  I was present for key portions of any procedure(s) performed by the resident/non-physician practitioner and I was immediately available to provide assistance  At this point I agree with the current assessment done in the Emergency Department    I have conducted an independent evaluation of this patient a history and physical is as follows:  Patient has had a chronic ulcer on her right foot she was seen yesterday E evening in the emergency room  She was treated with pain medications and Keflex  A follow-up was arranged with Podiatry for today by the emergency depart resident  The patient never went to the appointment so she came back to the ER  She has continued pain in her right foot  The patient has ulceration over the metatarsophalangeal of 1st toe it is on the dorsal aspect some mild redness mild tenderness but no crepitation  Podiatry was consulted they will be to stay down to see the patient she initially was not going to wait for the podiatry service because they were another institution  X-ray was performed there is some bony changes over the 1st metatarsal distal aspect  White count normal  CRP elevated  I had a no other discussion with the patient and convince her to stay for the podiatry evaluation  The podiatrist wanted to admit the patient for workup of osteomyelitis    ED Course         Critical Care Time  Procedures

## 2021-12-21 NOTE — PROCEDURES
RENAL HD NOTE   Baylee Loja 49 y o  female MRN: [id number]  [facility] Encounter: [id number]    The patient was seen and examined on hemodialysis    Time: 3 5 hours  Sodium: 138 Blood flow: 400   Dialyzer: F180 Potassium: 3 Dialysate flow: 1 5   Access: L arm AVF Bicarbonate: 35 Ultrafiltration goal: 3 kg   Medications on HD: none

## 2021-12-21 NOTE — OCCUPATIONAL THERAPY NOTE
OT CANCEL NOTE:    Orders for OT evaluation received  Per podiatry, pt will "likely require first ray amputation " OT will continue to follow and evaluate later as appropriate

## 2021-12-21 NOTE — ASSESSMENT & PLAN NOTE
Patient was seen in the ED yesterday instructed to follow-up with wound care as an outpatient  However, she returns today with concern for worsening infection  She was discharged on Keflex yesterday but did not fill the antibiotic script  There was concerned that the wound was probing to bone as well    Today she presents with elevated sed rate and concern for elevated CRP/sed rate  Follow up on x-ray  Continue with antibiotic therapy  Monitor symptoms

## 2021-12-21 NOTE — ASSESSMENT & PLAN NOTE
Lab Results   Component Value Date    HGBA1C 7 9 (H) 09/30/2021       No results for input(s): POCGLU in the last 72 hours      Blood Sugar Average: Last 72 hrs:   likely contributing to infection

## 2021-12-21 NOTE — ASSESSMENT & PLAN NOTE
Lab Results   Component Value Date    EGFR 4 12/21/2021    EGFR 5 12/20/2021    EGFR 4 11/25/2021    CREATININE 8 84 (H) 12/21/2021    CREATININE 7 78 (H) 12/20/2021    CREATININE 9 37 (H) 11/25/2021   Management per Nephrology

## 2021-12-21 NOTE — CONSULTS
Consultation - Infectious Disease   Noa Jackson 49 y o  female MRN: [id number]  Unit/Bed#: [facility] [place] 933-01 Encounter: [id number]      IMPRESSION & RECOMMENDATIONS:   Impression/Recommendations:  1  Right foot cellulitis  Due to #2  Blood cultures pending  Clinically stable without sepsis  Rec:  · Continue antibiotics as below  · Follow up blood cultures from admission  · Follow temperatures closely  · Recheck CBC in a m  · Supportive care as per the primary service    2  Chronic right foot DM ulcer  With osteomyelitis  In setting of #3  PTB by Podiatry and Xray suggests bone infection  Wound culture with S  Aureus  Recent history of MRSA  Rec:  · Change antibiotics to vancomycin  · Pharmacy consult for vancomycin dosing  · Follow-up wound culture and tailor antibiotics as indicated  · Follow up MRI  · Await final surgical plan from Podiatry    3  Recent 1st MTPJ septic arthritis  October 2021  Status post washout  Fluid aspirate with MRSA, OR cultures with Enterococcus, lactobacillus, Clostridium  Status post 6 weeks IV antibiotics through 11/7  Now complicated by above  4   Poorly controlled DM with DPN  Recent A1c September 2021 7 9  Risk factor for above  5   ESRD on HD  Via left AVF  Rec:  · Dose adjust antibiotics for HD  · Nephrology follow-up ongoing    The above impression and plan was discussed in detail with the patient and her significant other in detail  Antibiotics:  Cefazolin #1    Thank you for this consultation  We will follow along with you  HISTORY OF PRESENT ILLNESS:  Reason for Consult: Foot ulcer    HPI: Noa Jackson is a 49 y o  female with poorly controlled DM, DPN  She was recently admitted in October for septic arthritis of the right 1st MTPJ  She underwent washout and received 6 weeks of culture guided antibiotics  She states the wound over her right foot almost nearly closed and she has been followed closely by Podiatry as an outpatient    She presented to the ED on  for several days of increased pain, redness, swelling of the foot as well as increased drainage from her foot wound  Upon presentation she was noted to be afebrile with a normal WBC  She was initially given a dose of Keflex and developed hives  Cefazolin was started this morning which she is tolerating  She was evaluated by Podiatry who have ordered an MRI in anticipate probable 1st ray amputation  We are asked to comment on further evaluation and management  In performing this consult, I have reviewed prior admission and outpatient visit records in detail  REVIEW OF SYSTEMS:  She has felt subjective chills for the past several days  Denies nausea, vomiting, diarrhea  Felt very anxious about current medical situation  A complete system-based review of systems is otherwise negative      PAST MEDICAL HISTORY:  Past Medical History:   Diagnosis Date    Abnormal uterine bleeding (AUB)     Anemia     Anxiety     Arthritis     Chronic kidney disease     Claustrophobia     Diabetes mellitus ([address] )     Disease of thyroid gland     DVT (deep venous thrombosis) (HCC)     End stage kidney disease (HCC)     Foot ulcer due to secondary DM ([facility] )     Hemodialysis patient ([facility] )     Tues, Thurs, Sat    Hypertension     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[address] )     right eye    Morbid obesity ([address] )     Osteomyelitis of fifth toe of right foot ([facility] )     Panic attacks     Pulmonary embolism (HCC)     Reflux esophagitis     Thrombophlebitis of saphenous vein     Warfarin anticoagulation      Past Surgical History:   Procedure Laterality Date    AMPUTATION      r 4th toe    ARTERIOVENOUS GRAFT PLACEMENT      CATARACT EXTRACTION Bilateral     CERVICAL BIOPSY  W/ LOOP ELECTRODE EXCISION       SECTION      DIALYSIS FISTULA CREATION      DILATION AND CURETTAGE OF UTERUS      ENDOMETRIAL ABLATION W/ NOVASURE      EYE SURGERY      HYSTERECTOMY      @ age 57 (complete)   Ardeth Needs IR AV FISTULAGRAM/GRAFTOGRAM  10/11/2019    IR AV FISTULAGRAM/GRAFTOGRAM  8/12/2020    IR AV FISTULAGRAM/GRAFTOGRAM  12/23/2020    IR NON-TUNNELED CENTRAL LINE PLACEMENT  6/29/2021    IR NON-TUNNELED CENTRAL LINE PLACEMENT  10/4/2021    OOPHORECTOMY Bilateral     @ age 57    PERICARDIUM SURGERY      ND AMPUTATION TOE,MT-P JT Right 5/28/2020    Procedure: AMPUTATION TOE- 5th;  Surgeon: Bella Zazueta DPM;  Location: [facility];  Service: Podiatry    ND COLONOSCOPY FLX DX W/COLLJ [address] PFRMD N/A 3/13/2019    Procedure: COLONOSCOPY;  Surgeon: Meeta Baumann MD;  Location: [facility]; Service: Gastroenterology    ND ESOPHAGOGASTRODUODENOSCOPY TRANSORAL DIAGNOSTIC N/A 3/13/2019    Procedure: ESOPHAGOGASTRODUODENOSCOPY (EGD); Surgeon: Meeta Baumann MD;  Location: [facility]; Service: Gastroenterology    ND LAPAROSCOPY W TOT HYSTERECT UTERUS 250 GRAM OR LESS N/A 2/16/2016    Procedure: HYSTERECTOMY LAPAROSCOPIC TOTAL [facility]), with bilateral salpingectomy;  Surgeon: Brendan Drew DO;  [facility];  Service: Gynecology    THROMBECTOMY / ARTERIOVENOUS GRAFT REVISION      TOE SURGERY Right     removal of the 4th toe    WOUND DEBRIDEMENT Right 10/8/2021    Procedure: R 1st MTPJ washout ;  Surgeon: Renato Zacarias DPM;  [facility];  Service: Podiatry       FAMILY HISTORY:  Non-contributory    SOCIAL HISTORY:  Social History     Substance and Sexual Activity   Alcohol Use Never    Alcohol/week: 0 0 standard drinks     Social History     Substance and Sexual Activity   Drug Use No     Social History     Tobacco Use   Smoking Status Never Smoker   Smokeless Tobacco Never Used       ALLERGIES:  Allergies   Allergen Reactions    Iodinated Diagnostic Agents Itching    Keflex [Cephalexin] Hives       MEDICATIONS:  All current active medications have been reviewed      PHYSICAL EXAM:  Vitals:  Temp:  [97 6 °F (36 4 °C)-98 5 °F (36 9 °C)] 97 6 °F (36 4 °C)  HR:  [69-89] 73  Resp:  [16-18] 16  BP: (119-191)/(51-91) 175/51  SpO2:  [95 %] 95 %  Temp (24hrs), Av 1 °F (36 7 °C), Min:97 6 °F (36 4 °C), Max:98 5 °F (36 9 °C)  Current: Temperature: 97 6 °F (36 4 °C)     Physical Exam:  General:  Well-nourished, well-developed, in no acute distress  Eyes:  Conjunctive clear with no hemorrhages or effusions  Oropharynx:  No ulcers, no lesions  Neck:  Supple, no lymphadenopathy  Lungs:  Normal respiratory excursion, no accessory muscle use  Cardiac:  Regular rate and rhythm, extremities well perfused  Abdomen:  Soft, non-tender, non-distended  Extremities:  No peripheral cyanosis, clubbing, or edema  Skin:  No rashes, no ulcers  Neurological:  Moves all four extremities spontaneously, sensation grossly intact    LABS, IMAGING, & OTHER STUDIES:  Lab Results:  I have personally reviewed pertinent labs  Results from last 7 days   Lab Units 21  0842 21  1619   POTASSIUM mmol/L 4 5 4 4   CHLORIDE mmol/L 103 103   CO2 mmol/L 25 26   BUN mg/dL 51* 44*   CREATININE mg/dL 8 84* 7 78*   EGFR ml/min/1 73sq m 4 5   CALCIUM mg/dL 8 2* 9 0   AST U/L 12  --    ALT U/L 14  --    ALK PHOS U/L 141*  --      Results from last 7 days   Lab Units 21  0842 21  1619   WBC Thousand/uL 5 69 6 46   HEMOGLOBIN g/dL 7 5* 7 8*   PLATELETS Thousands/uL 203 195     Results from last 7 days   Lab Units 21   BLOOD CULTURE   --  Received in Microbiology Lab  Culture in Progress  Received in Microbiology Lab  Culture in Progress  GRAM STAIN RESULT  Rare Polys  No bacteria seen  --   --    WOUND CULTURE  2+ Growth of Staphylococcus aureus*  --   --        Imaging Studies:   I have personally reviewed pertinent imaging study reports and images in PACS  X-ray right foot reviewed personally osteomyelitis distal 1st metatarsal     EKG, Pathology, and Other Studies:   I have personally reviewed pertinent reports

## 2021-12-21 NOTE — PHYSICAL THERAPY NOTE
Physical Therapy Cancellation Note    Patient's Name: Yesenia Mccollum        12/21/21 0756   PT Last Visit   PT Visit Date 12/21/21   Note Type   Note type Evaluation; Cancelled Session   Cancel Reasons Other       Orders received, chart reviewed  Pt admit with right foot ulcer  Per podiatry, pt will "likely require first ray amputation "  Will hold PT at this time and follow for evaluation/treatment as medically appropriate  Thank you       Alfonso Cortes, PT, DPT

## 2021-12-21 NOTE — CONSULTS
[address] [facility] 49 y o  female MRN: [id number]  [facility] En[facility]: [id number]    ASSESSMENT and PLAN:  1  ESRD on HD ([address], TTS):   · HD today  2  Access: L arm AVF    3  Hypertension:   · BP is above goal   · Current medications: Carvedilol 25 mg BID, nifedipine 30 mg OD  · Will need to clarify if taking Torsemide 100 mg    · EDW was 121 1 kg  However, she is losing weight  · Will plan for 3 kg UF on HD today  Her weight after HD today should be her new dry weight  4  Anemia:   · Hemoglobin is below goal but stable  · Not on MITCHELL due to history PE  · Will need PRBC transfusion as needed for Hgb <7 0    5  Mineral and bone disease:   · Continue sevelamer 1600 mg TID for hyperphosphatemia  · Continue Sensipar 30 mg daily for secondary hyperparathyroidism  · Not on Hectorol  · Continue renal diet    6  Right foot ulcer:  · Per podiatry and SLIM  · On antibiotics  SUMMARY OF RECOMMENDATIONS:   HD today   3 kg UF on HD   New EDW will be weight after HD today  HISTORY OF PRESENT ILLNESS:  Requesting Physician: Armanda Schilder, DO  Reason for Consult: ESRD on HD    Whit Novoa is a 49 y o  female who was [facility] after presenting with R foot pain and redness  A renal consultation is requested today for assistance in the management of ESRD  Whit Novoa is a known ESRD patient who undergoes maintenance hemodialysis at [facility] [address] on a TTS schedule  Esther Maurer has a history of HTN, DM, HLP, ESRD on HD, GERD, obesity, DVT  She was recently seen in the ER on December 19, 2021 for a right foot wound  She was advised follow-up with podiatry but noticed worsening pain, swelling and redness  Due to such, she came to [facility] and was subsequently admitted  She denied any fever but had chills  There was no chest pain, nausea, vomiting, diarrhea, abdominal pain    We are now being consulted for assistance with management ESRD on HD  Her last HD was on , which was uneventful  PAST MEDICAL HISTORY:  Past Medical History:   Diagnosis Date    Abnormal uterine bleeding (AUB)     Anemia     Anxiety     Arthritis     Chronic kidney disease     Claustrophobia     Diabetes mellitus ([address] )     Disease of thyroid gland     DVT (deep venous thrombosis) (HCC)     End stage kidney disease (HCC)     Foot ulcer due to secondary DM ([address] )     Hemodialysis patient ([place] [id number] )     Tues, Thurs, Sat    Hypertension     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     right eye    Morbid obesity ([facility] )     Osteomyelitis of fifth toe of right foot ([facility] )     Panic attacks     Pulmonary embolism (HCC)     Reflux esophagitis     Thrombophlebitis of saphenous vein     Warfarin anticoagulation      PAST SURGICAL HISTORY:  Past Surgical History:   Procedure Laterality Date    AMPUTATION      r 4th toe    ARTERIOVENOUS GRAFT PLACEMENT      CATARACT EXTRACTION Bilateral     CERVICAL BIOPSY  W/ LOOP ELECTRODE EXCISION       SECTION      DIALYSIS FISTULA CREATION      DILATION AND CURETTAGE OF UTERUS      ENDOMETRIAL ABLATION W/ NOVASURE      EYE SURGERY      HYSTERECTOMY      @ age 57 (complete)    IR AV FISTULAGRAM/GRAFTOGRAM  10/11/2019    IR AV FISTULAGRAM/GRAFTOGRAM  2020    IR AV FISTULAGRAM/GRAFTOGRAM  2020    IR NON-TUNNELED CENTRAL LINE PLACEMENT  2021    IR NON-TUNNELED CENTRAL LINE PLACEMENT  10/4/2021    OOPHORECTOMY Bilateral     @ age 57    PERICARDIUM SURGERY      IL AMPUTATION TOE,MT-P JT Right 2020    Procedure: AMPUTATION TOE- 5th;  Surgeon: Micah Hernandez DPM;  Location: [facility];  Service: Podiatry    IL COLONOSCOPY FLX DX W/COLLJ SPEC WHEN PFRMD N/A 3/13/2019    Procedure: COLONOSCOPY;  Surgeon: Warren Borges MD;  Location: [facility];   Service: Gastroenterology    IL ESOPHAGOGASTRODUODENOSCOPY TRANSORAL DIAGNOSTIC N/A 3/13/2019    Procedure: ESOPHAGOGASTRODUODENOSCOPY (EGD); Surgeon: Kaila Sabillon MD;  Location: [facility];   Service: Gastroenterology    MS LAPAROSCOPY W TOT HYSTERECT UTERUS 250 GRAM OR LESS N/A 2/16/2016    Procedure: HYSTERECTOMY LAPAROSCOPIC TOTAL ([address]), with bilateral salpingectomy;  Surgeon: Oniel Molina DO;  Location: [facility];  Service: Gynecology    THROMBECTOMY / ARTERIOVENOUS GRAFT REVISION      TOE SURGERY Right     removal of the 4th toe    WOUND DEBRIDEMENT Right 10/8/2021    Procedure: R 1st MTPJ washout ;  Surgeon: Rudy Ahuja DPM;  Location: [facility];  Service: Podiatry     ALLERGIES:  Allergies   Allergen Reactions    Iodinated Diagnostic Agents Itching    Keflex [Cephalexin] Hives     SOCIAL HISTORY:  Social History     Substance and Sexual Activity   Alcohol Use Never    Alcohol/week: 0 0 standard drinks     Social History     Substance and Sexual Activity   Drug Use No     Social History     Tobacco Use   Smoking Status Never Smoker   Smokeless Tobacco Never Used     FAMILY HISTORY:  Family History   Problem Relation Age of Onset    Heart disease Family     Diabetes Family     Heart disease Mother     Cancer Brother         neck    Throat cancer Brother     Ovarian cancer Maternal Aunt 40     MEDICATIONS:    Current Facility-Administered Medications:     acetaminophen (TYLENOL) tablet 650 mg, 650 mg, Oral, Q6H PRN, Hetul Andrea, DO    albuterol (PROVENTIL HFA,VENTOLIN HFA) inhaler 2 puff, 2 puff, Inhalation, Q6H PRN, Hetul Andrea, DO    ALPRAZolam (XANAX) tablet 0 25 mg, 0 25 mg, Oral, BID PRN, Hetul Andrea, DO, 0 25 mg at 12/21/21 0749    carvedilol (COREG) tablet 25 mg, 25 mg, Oral, BID With Meals, Hetul Andrea, DO    ceFAZolin (ANCEF) IVPB (premix in dextrose) 1,000 mg 50 mL, 1,000 mg, Intravenous, Q24H, Hetul Andrea, DO, Stopped at 12/21/21 0519    cinacalcet (SENSIPAR) tablet 30 mg, 30 mg, Oral, Daily, Hetul Andrea, DO    hydrALAZINE (APRESOLINE) injection 5 mg, 5 mg, Intravenous, Q6H PRN, Hetul Marcus Person, DO, 5 mg at 12/21/21 0047    insulin glargine (LANTUS) subcutaneous injection 20 Units 0 2 mL, 20 Units, Subcutaneous, HS, Hetul Andrea, DO, 20 Units at 12/20/21 2237    insulin lispro (HumaLOG) 100 units/mL subcutaneous injection 1-5 Units, 1-5 Units, Subcutaneous, TID AC **AND** Fingerstick Glucose (POCT), , , TID AC, Luísul Andrea, DO    insulin lispro (HumaLOG) 100 units/mL subcutaneous injection 3 Units, 3 Units, Subcutaneous, TID With Meals, Hetul Andrea, DO    levothyroxine tablet 50 mcg, 50 mcg, Oral, Daily, Hetul Andrea, DO, 50 mcg at 12/21/21 0533    loratadine (CLARITIN) tablet 10 mg, 10 mg, Oral, Daily, Hetul Andrea, DO    melatonin tablet 3 mg, 3 mg, Oral, HS, Hetul Andrea, DO, 3 mg at 12/20/21 2238    NIFEdipine (PROCARDIA XL) 24 hr tablet 30 mg, 30 mg, Oral, Daily, Hetul Andrea, DO    nystatin (MYCOSTATIN) powder, , Topical, BID, Hetul Andrea, DO    ondansetron (ZOFRAN) injection 4 mg, 4 mg, Intravenous, Q6H PRN, Hetul Andrea, DO    oxyCODONE-acetaminophen (PERCOCET) 5-325 mg per tablet 1 tablet, 1 tablet, Oral, Q6H PRN, Hetul Andrea, DO, 1 tablet at 12/21/21 0047    pantoprazole (PROTONIX) EC tablet 40 mg, 40 mg, Oral, Early Morning, Hetul Andrea, DO, 40 mg at 12/21/21 0533    polyethylene glycol (MIRALAX) packet 17 g, 17 g, Oral, Daily, Hetul Andrea, DO    senna (SENOKOT) tablet 17 2 mg, 2 tablet, Oral, HS PRN, Hetul Adnrea, DO    sertraline (ZOLOFT) tablet 50 mg, 50 mg, Oral, Daily, Hetul Andrea, DO    sevelamer (RENAGEL) tablet 1,600 mg, 1,600 mg, Oral, TID With Meals, Hetul Andrea, DO, 1,600 mg at 12/21/21 0749    warfarin (COUMADIN) tablet 9 mg, 9 mg, Oral, Daily (warfarin), Hetpadmini Toscanoa, DO, 9 mg at 12/20/21 8142    REVIEW OF SYSTEMS:  All the systems were reviewed and were negative except as documented on the HPI      PHYSICAL EXAM:  Current Weight: Weight - Scale: 122 kg (268 lb 15 4 oz)  First Weight: Weight - Scale: 122 kg (268 lb 15 4 oz)  Vitals:    12/20/21 2006 12/21/21 0825 12/21/21 0830 12/21/21 0900   BP: (!) 180/73 (!) 175/56 (!) 177/91 (!) 177/76   BP Location: Right arm Right arm     Pulse: 76 89 71 69   Resp: 18 16 17 16   Temp:  98 5 °F (36 9 °C)     TempSrc:  Oral     SpO2: 95%      Weight:       Height:           Intake/Output Summary (Last 24 hours) at 12/21/2021 5134  Last data filed at 12/21/2021 0825  Gross per 24 hour   Intake 250 ml   Output 0 ml   Net 250 ml     Physical Exam  General: conscious, coherent, cooperative, not in distress  Skin: warm, dry, good turgor  Eyes: pale conjunctivae, no scleral icterus  ENT: moist lips and mucous membranes  Neck: supple, no JVD  Chest/Lungs: clear breath sounds  CVS: distinct heart sounds, normal rate, regular rhythm, no rub  Abdomen: soft, non-tender, non-distended, normoactive bowel sounds  Extremities: (+) R leg edema  : no hernandez catheter  Neuro: awake, alert, oriented to time, place and person  Psych: appropriate affect  Lab Results:   Results from last 7 days   Lab Units 12/21/21  0842 12/20/21  1619   WBC Thousand/uL 5 69 6 46   HEMOGLOBIN g/dL 7 5* 7 8*   HEMATOCRIT % 23 0* 24 1*   PLATELETS Thousands/uL 203 195   POTASSIUM mmol/L 4 5 4 4   CHLORIDE mmol/L 103 103   CO2 mmol/L 25 26   BUN mg/dL 51* 44*   CREATININE mg/dL 8 84* 7 78*   CALCIUM mg/dL 8 2* 9 0   MAGNESIUM mg/dL 2 1  --    PHOSPHORUS mg/dL 4 1  --    ALK PHOS U/L 141*  --    ALT U/L 14  --    AST U/L 12  --      Lab Results   Component Value Date    CALCIUM 8 2 (L) 12/21/2021    PHOS 4 1 12/21/2021     Other Studies: none

## 2021-12-21 NOTE — PROGRESS NOTES
[facility]  Progress Note Emir Solano 1965, 49 y o  female MRN: [id number]  Unit/Bed#: [facility] 933-01 Encounter: [id number]  Primary Care Provider: Jose Alfredo Oquendo MD   Date and time admitted to hospital: 12/20/2021  3:07 PM    Hypertension  Assessment & Plan  Continue antihypertensive agents  -Coreg 25 mg b i d   -Procardia 30 mg daily, may need to consider increasing 60 mg as patient's blood pressure is elevated      P r n  hydralazine    Type 2 diabetes mellitus with neurologic complication, with long-term current use of insulin [facility])  Assessment & Plan  Lab Results   Component Value Date    HGBA1C 7 9 (H) 09/30/2021       Recent Labs     12/20/21 2227 12/21/21  0748   POCGLU 237* 153*       Blood Sugar Average: Last 72 hrs:  (P) 195 likely contributing to infection  Will continue on 20 units Lantus at bedtime, 3 units with meals    Hyperlipemia  Assessment & Plan  Continue atorvastatin 20 mg daily    ESRD on hemodialysis [facility])  Assessment & Plan  Lab Results   Component Value Date    EGFR 4 12/21/2021    EGFR 5 12/20/2021    EGFR 4 11/25/2021    CREATININE 8 84 (H) 12/21/2021    CREATININE 7 78 (H) 12/20/2021    CREATININE 9 37 (H) 11/25/2021   Management per Nephrology    History of DVT (deep vein thrombosis)/PE  Assessment & Plan  Continue with Coumadin  INR 2 9 this morning, will continue to trend and may need to adjust dose if trending, for now continue warfarin 9 mg daily    * Foot ulcer, right [facility])  Assessment & Plan  Patient was seen in the ED yesterday instructed to follow-up with wound care as an outpatient  However, she returns today with concern for worsening infection  She was discharged on Keflex yesterday but did not fill the antibiotic script  There was concerned that the wound was probing to bone as well    Today she presents with elevated sed rate and concern for elevated CRP/sed rate  Follow up on x-ray  Continue with antibiotic therapy  Monitor symptoms    21:  Discussed with podiatry unfortunately appears she may need in amputation, antibiotics changed to vancomycin per Infectious Disease          VTE Pharmacologic Prophylaxis:   High Risk (Score >/= 5) - Pharmacological DVT Prophylaxis Ordered: warfarin (Coumadin)  Sequential Compression Devices Ordered  Patient Centered Rounds: I performed bedside rounds with nursing staff today  Discussions with Specialists or Other Care Team Provider: ID, podiatry    Education and Discussions with Family / Patient: Patient declined call to   Time Spent for Care: 30 minutes  More than 50% of total time spent on counseling and coordination of care as described above  Current Length of Stay: 1 day(s)  Current Patient Status: Inpatient   Certification Statement: The patient will continue to require additional inpatient hospital stay due to Ongoing podiatry evaluation, may likely require amputation  Discharge Plan: Anticipate discharge in >72 hrs to discharge location to be determined pending rehab evaluations  Code Status: Level 1 - Full Code    Subjective:   Patient very anxious today about possibly requiring amputation  She denies any fevers but does have some chills she has been feeling over the past day  Denies any other acute complaints    Objective:     Vitals:   Temp (24hrs), Av 1 °F (36 7 °C), Min:97 6 °F (36 4 °C), Max:98 5 °F (36 9 °C)    Temp:  [97 6 °F (36 4 °C)-98 5 °F (36 9 °C)] 98 1 °F (36 7 °C)  HR:  [69-89] 73  Resp:  [16-18] 18  BP: (119-180)/(51-91) 175/51  SpO2:  [95 %] 95 %  Body mass index is 43 41 kg/m²  Input and Output Summary (last 24 hours): Intake/Output Summary (Last 24 hours) at 2021 1619  Last data filed at 2021 1249  Gross per 24 hour   Intake 970 ml   Output 2281 ml   Net -1311 ml       Physical Exam:   Physical Exam  Vitals and nursing note reviewed  Constitutional:       General: She is not in acute distress       Appearance: She is well-developed  She is not ill-appearing, toxic-appearing or diaphoretic  HENT:      Head: Normocephalic and atraumatic  Eyes:      General: No scleral icterus  Conjunctiva/sclera: Conjunctivae normal    Cardiovascular:      Rate and Rhythm: Normal rate and regular rhythm  Heart sounds: No murmur heard  No friction rub  No gallop  Pulmonary:      Effort: Pulmonary effort is normal  No respiratory distress  Breath sounds: Normal breath sounds  No stridor  No wheezing, rhonchi or rales  Chest:      Chest wall: No tenderness  Abdominal:      General: There is no distension  Palpations: Abdomen is soft  There is no mass  Tenderness: There is no abdominal tenderness  There is no guarding or rebound  Hernia: No hernia is present  Musculoskeletal:      Cervical back: Neck supple  Right lower leg: Edema present  Skin:     General: Skin is warm and dry  Coloration: Skin is not jaundiced or pale  Findings: No bruising, erythema, lesion or rash  Neurological:      Mental Status: She is alert and oriented to person, place, and time            Additional Data:     Labs:  Results from last 7 days   Lab Units 12/21/21  0842   WBC Thousand/uL 5 69   HEMOGLOBIN g/dL 7 5*   HEMATOCRIT % 23 0*   PLATELETS Thousands/uL 203   NEUTROS PCT % 71   LYMPHS PCT % 13*   MONOS PCT % 10   EOS PCT % 4     Results from last 7 days   Lab Units 12/21/21  0842   SODIUM mmol/L 136   POTASSIUM mmol/L 4 5   CHLORIDE mmol/L 103   CO2 mmol/L 25   BUN mg/dL 51*   CREATININE mg/dL 8 84*   ANION GAP mmol/L 8   CALCIUM mg/dL 8 2*   ALBUMIN g/dL 2 7*   TOTAL BILIRUBIN mg/dL 0 46   ALK PHOS U/L 141*   ALT U/L 14   AST U/L 12   GLUCOSE RANDOM mg/dL 124     Results from last 7 days   Lab Units 12/21/21  0842   INR  2 90*     Results from last 7 days   Lab Units 12/21/21  0748 12/20/21  2227   POC GLUCOSE mg/dl 153* 237*               Lines/Drains:  Invasive Devices  Report    Peripheral Intravenous Line            Peripheral IV 12/20/21 Right Wrist 1 day          Line            Hemodialysis AV Fistula 02/20/18 Left Forearm 1400 days                      Imaging: No pertinent imaging reviewed  Recent Cultures (last 7 days):   Results from last 7 days   Lab Units 12/20/21 1954 12/20/21 1953 12/20/21 1952   BLOOD CULTURE   --  Received in Microbiology Lab  Culture in Progress  Received in Microbiology Lab  Culture in Progress     GRAM STAIN RESULT  Rare Polys  No bacteria seen  --   --    WOUND CULTURE  2+ Growth of Staphylococcus aureus*  --   --        Last 24 Hours Medication List:   Current Facility-Administered Medications   Medication Dose Route Frequency Provider Last Rate    acetaminophen  650 mg Oral Q6H PRN Hetul Andrea, DO      albuterol  2 puff Inhalation Q6H PRN Hetul Andrea, DO      ALPRAZolam  0 25 mg Oral 4x Daily PRN Ferdinand Banai, DO      carvedilol  25 mg Oral BID With Meals Hetul Andrea, DO      cinacalcet  30 mg Oral Daily Hetul Andrea, DO      hydrALAZINE  5 mg Intravenous Q6H PRN Hetul Andrea, DO      insulin glargine  20 Units Subcutaneous HS Hetul Andrea, DO      insulin lispro  1-5 Units Subcutaneous TID AC Hetul Andrea, DO      insulin lispro  3 Units Subcutaneous TID With Meals Hetul Andrea, DO      levothyroxine  50 mcg Oral Daily Hetul Andrea, DO      loratadine  10 mg Oral Daily Hetul Andrea, DO      melatonin  3 mg Oral HS Hetul Andrea, DO      NIFEdipine  30 mg Oral Daily Hetul Andrea, DO      nystatin   Topical BID Hetul Andrea, DO      ondansetron  4 mg Intravenous Q6H PRN Hetul Andrea, DO      oxyCODONE-acetaminophen  1 tablet Oral Q6H PRN Hetul Andrea, DO      pantoprazole  40 mg Oral Early Morning Hetul Andrea, DO      polyethylene glycol  17 g Oral Daily Hetul Andrea, DO      senna  2 tablet Oral HS PRN Hetul Andrea, DO      [START ON 12/22/2021] sertraline  75 mg Oral Daily Ferdinand Banai, DO      sevelamer  1,600 mg Oral TID With Meals Hetul Andrea, DO  vancomycin  20 mg/kg (Adjusted) Intravenous Once Marc Jimenez MD 1,750 mg (12/21/21 1436)    [START ON 12/23/2021] vancomycin  10 mg/kg (Adjusted) Intravenous After Dialysis Marc Jimenez MD     Mavis Cousin warfarin  9 mg Oral Daily (warfarin) Farhad Andrea DO          Today, Patient Was Seen By: Bailey Johnson DO    **Please Note: This note may have been constructed using a voice recognition system  **

## 2021-12-21 NOTE — PROGRESS NOTES
Vancomycin Assessment    Baylee Loja is a 49 y o  female who is currently receiving vancomycin 1750 mg load then 750mg after each HD, Check random trough before third dose  Give if level is < 20  For bone and joint infection   Relevant clinical data and objective history reviewed:  Creatinine   Date Value Ref Range Status   12/21/2021 8 84 (H) 0 60 - 1 30 mg/dL Final     Comment:     Standardized to IDMS reference method   12/20/2021 7 78 (H) 0 60 - 1 30 mg/dL Final     Comment:     Standardized to IDMS reference method   11/25/2021 9 37 (H) 0 60 - 1 30 mg/dL Final     Comment:     Standardized to IDMS reference method   10/11/2015 6 68 (H) 0 60 - 1 30 mg/dL Final     Comment:     Standardized to IDMS reference method   10/08/2015 9 09 (H) 0 60 - 1 30 mg/dL Final     Comment:     Standardized to IDMS reference method   10/07/2015 7 06 (H) 0 60 - 1 30 mg/dL Final     Comment:     Standardized to IDMS reference method     VANCOMYCIN RANDOM   Date Value Ref Range Status   05/26/2014 30 1 mcg/mL Final     Comment:     The above 1 analytes [place] [address]       Vancomycin Rm   Date Value Ref Range Status   10/16/2021 25 9 ug/mL Final     BP (!) 175/51 (BP Location: Right arm)   Pulse 73   Temp 97 6 °F (36 4 °C) (Oral)   Resp 16   Ht 5' 6" (1 676 m)   Wt 122 kg (268 lb 15 4 oz)   LMP 02/12/2016 (Exact Date)   SpO2 95%   BMI 43 41 kg/m²   I/O last 3 completed shifts:   In: 48 [IV Piggyback:50]  Out: 0   Lab Results   Component Value Date/Time    BUN 51 (H) 12/21/2021 08:42 AM    BUN 64 (H) 10/11/2015 06:08 AM    WBC 5 69 12/21/2021 08:42 AM    WBC 8 45 10/11/2015 06:14 AM    HGB 7 5 (L) 12/21/2021 08:42 AM    HGB 8 7 (L) 10/11/2015 06:14 AM    HCT 23 0 (L) 12/21/2021 08:42 AM    HCT 26 3 (L) 10/11/2015 06:14 AM    MCV 92 12/21/2021 08:42 AM    MCV 91 10/11/2015 06:14 AM     12/21/2021 08:42 AM     10/11/2015 06:14 AM     Temp Readings from Last 3 Encounters:   12/21/21 97 6 °F (36 4 °C) (Oral)   12/19/21 98 5 °F (36 9 °C) (Oral)   12/09/21 (!) 96 5 °F (35 8 °C)     Vancomycin Days of Therapy: 1    Assessment/Plan  The patient is currently on vancomycin utilizing pulse dosing based on adjusted body weight (due to obesity)  Baseline risks associated with therapy include: pre-existing renal impairment and concomitant nephrotoxic medications  The patient is currently receiving  1750 mg load ( based on 20 mg/kg AdjBW) then 750 mg (10mg/kg based on AdjBW) after each HD on Tues, Thurs and Sat  The dose is clinically appropriate and dose will be continued  Pharmacy will also follow closely for s/sx of nephrotoxicity, infusion reactions, and appropriateness of therapy  BMP and CBC will be ordered per protocol  Plan for random trough on Sat 12/25 at 0600 as patient approaches steady state prior to the 3rd  dose  Due to infection severity, will target a trough of 15-20 (appropriate for most indications)   Give 750 mg dose with next HD on Thurs 12/23 then if random level is < 20 on Sat 12/25  Re-evaluate on Sat 12/25 if needed  Pharmacy will continue to follow the patients culture results and clinical progress daily      Maria Isabel Farrell, Pharmacist

## 2021-12-21 NOTE — ASSESSMENT & PLAN NOTE
Patient was seen in the ED yesterday instructed to follow-up with wound care as an outpatient  However, she returns today with concern for worsening infection  She was discharged on Keflex yesterday but did not fill the antibiotic script  There was concerned that the wound was probing to bone as well    Today she presents with elevated sed rate and concern for elevated CRP/sed rate  Follow up on x-ray  Continue with antibiotic therapy  Monitor symptoms    12/21/21:  Discussed with podiatry unfortunately appears she may need in amputation, antibiotics changed to vancomycin per Infectious Disease

## 2021-12-21 NOTE — PLAN OF CARE
Problem: Potential for Falls  Goal: Patient will remain free of falls  Description: INTERVENTIONS:  - Educate patient/family on patient safety including physical limitations  - Instruct patient to call for assistance with activity   - Consult OT/PT to assist with strengthening/mobility   - Keep Call bell within reach  - Keep bed low and locked with side rails adjusted as appropriate  - Keep care items and personal belongings within reach  - Initiate and maintain comfort rounds  - Make Fall Risk Sign visible to staff  - Apply yellow socks and bracelet for high fall risk patients  - Consider moving patient to room near nurses station  Outcome: Progressing     Problem: METABOLIC, FLUID AND ELECTROLYTES - ADULT  Goal: Electrolytes maintained within normal limits  Description: INTERVENTIONS:  - Monitor labs and assess patient for signs and symptoms of electrolyte imbalances  - Administer electrolyte replacement as ordered  - Monitor response to electrolyte replacements, including repeat lab results as appropriate  - Instruct patient on fluid and nutrition as appropriate  Outcome: Progressing  Goal: Fluid balance maintained  Description: INTERVENTIONS:  - Monitor labs   - Monitor I/O and WT  - Instruct patient on fluid and nutrition as appropriate  - Assess for signs & symptoms of volume excess or deficit  Outcome: Progressing

## 2021-12-22 LAB
ANION GAP SERPL CALCULATED.3IONS-SCNC: 4 MMOL/L (ref 4–13)
BACTERIA SPEC ANAEROBE CULT: NORMAL
BACTERIA WND AEROBE CULT: ABNORMAL
BASOPHILS # BLD AUTO: 0.03 THOUSANDS/ΜL (ref 0–0.1)
BASOPHILS NFR BLD AUTO: 1 % (ref 0–1)
BUN SERPL-MCNC: 41 MG/DL (ref 5–25)
CALCIUM SERPL-MCNC: 8.1 MG/DL (ref 8.3–10.1)
CHLORIDE SERPL-SCNC: 105 MMOL/L (ref 100–108)
CO2 SERPL-SCNC: 28 MMOL/L (ref 21–32)
CREAT SERPL-MCNC: 6.59 MG/DL (ref 0.6–1.3)
EOSINOPHIL # BLD AUTO: 0.31 THOUSAND/ΜL (ref 0–0.61)
EOSINOPHIL NFR BLD AUTO: 6 % (ref 0–6)
ERYTHROCYTE [DISTWIDTH] IN BLOOD BY AUTOMATED COUNT: 14.7 % (ref 11.6–15.1)
GFR SERPL CREATININE-BSD FRML MDRD: 6 ML/MIN/1.73SQ M
GLUCOSE SERPL-MCNC: 117 MG/DL (ref 65–140)
GLUCOSE SERPL-MCNC: 125 MG/DL (ref 65–140)
GLUCOSE SERPL-MCNC: 220 MG/DL (ref 65–140)
GLUCOSE SERPL-MCNC: 284 MG/DL (ref 65–140)
GLUCOSE SERPL-MCNC: 43 MG/DL (ref 65–140)
GLUCOSE SERPL-MCNC: 44 MG/DL (ref 65–140)
GLUCOSE SERPL-MCNC: 59 MG/DL (ref 65–140)
GLUCOSE SERPL-MCNC: 75 MG/DL (ref 65–140)
GRAM STN SPEC: ABNORMAL
GRAM STN SPEC: ABNORMAL
HCT VFR BLD AUTO: 22 % (ref 34.8–46.1)
HGB BLD-MCNC: 7.1 G/DL (ref 11.5–15.4)
IMM GRANULOCYTES # BLD AUTO: 0.03 THOUSAND/UL (ref 0–0.2)
IMM GRANULOCYTES NFR BLD AUTO: 1 % (ref 0–2)
INR PPP: 3.19 (ref 0.84–1.19)
LYMPHOCYTES # BLD AUTO: 0.83 THOUSANDS/ΜL (ref 0.6–4.47)
LYMPHOCYTES NFR BLD AUTO: 15 % (ref 14–44)
MCH RBC QN AUTO: 30.3 PG (ref 26.8–34.3)
MCHC RBC AUTO-ENTMCNC: 32.3 G/DL (ref 31.4–37.4)
MCV RBC AUTO: 94 FL (ref 82–98)
MONOCYTES # BLD AUTO: 0.58 THOUSAND/ΜL (ref 0.17–1.22)
MONOCYTES NFR BLD AUTO: 10 % (ref 4–12)
NEUTROPHILS # BLD AUTO: 3.83 THOUSANDS/ΜL (ref 1.85–7.62)
NEUTS SEG NFR BLD AUTO: 67 % (ref 43–75)
NRBC BLD AUTO-RTO: 0 /100 WBCS
PLATELET # BLD AUTO: 191 THOUSANDS/UL (ref 149–390)
PMV BLD AUTO: 10.4 FL (ref 8.9–12.7)
POTASSIUM SERPL-SCNC: 4.6 MMOL/L (ref 3.5–5.3)
PROTHROMBIN TIME: 31 SECONDS (ref 11.6–14.5)
RBC # BLD AUTO: 2.34 MILLION/UL (ref 3.81–5.12)
SODIUM SERPL-SCNC: 137 MMOL/L (ref 136–145)
WBC # BLD AUTO: 5.61 THOUSAND/UL (ref 4.31–10.16)

## 2021-12-22 PROCEDURE — 99233 SBSQ HOSP IP/OBS HIGH 50: CPT | Performed by: INTERNAL MEDICINE

## 2021-12-22 PROCEDURE — 99232 SBSQ HOSP IP/OBS MODERATE 35: CPT | Performed by: INTERNAL MEDICINE

## 2021-12-22 PROCEDURE — 99024 POSTOP FOLLOW-UP VISIT: CPT | Performed by: PODIATRIST

## 2021-12-22 PROCEDURE — 85025 COMPLETE CBC W/AUTO DIFF WBC: CPT | Performed by: INTERNAL MEDICINE

## 2021-12-22 PROCEDURE — 82948 REAGENT STRIP/BLOOD GLUCOSE: CPT

## 2021-12-22 PROCEDURE — 99232 SBSQ HOSP IP/OBS MODERATE 35: CPT | Performed by: PHYSICIAN ASSISTANT

## 2021-12-22 PROCEDURE — 80048 BASIC METABOLIC PNL TOTAL CA: CPT | Performed by: INTERNAL MEDICINE

## 2021-12-22 PROCEDURE — 85610 PROTHROMBIN TIME: CPT | Performed by: INTERNAL MEDICINE

## 2021-12-22 RX ORDER — DEXTROSE MONOHYDRATE 25 G/50ML
25 INJECTION, SOLUTION INTRAVENOUS ONCE
Status: COMPLETED | OUTPATIENT
Start: 2021-12-22 | End: 2021-12-22

## 2021-12-22 RX ADMIN — CARVEDILOL 25 MG: 25 TABLET, FILM COATED ORAL at 17:20

## 2021-12-22 RX ADMIN — LEVOTHYROXINE SODIUM 50 MCG: 50 TABLET ORAL at 09:01

## 2021-12-22 RX ADMIN — GABAPENTIN 100 MG: 100 CAPSULE ORAL at 17:20

## 2021-12-22 RX ADMIN — ALPRAZOLAM 0.25 MG: 0.25 TABLET ORAL at 15:38

## 2021-12-22 RX ADMIN — LORATADINE 10 MG: 10 TABLET ORAL at 09:01

## 2021-12-22 RX ADMIN — Medication 3 MG: at 22:03

## 2021-12-22 RX ADMIN — POLYETHYLENE GLYCOL 3350 17 G: 17 POWDER, FOR SOLUTION ORAL at 08:56

## 2021-12-22 RX ADMIN — NIFEDIPINE 30 MG: 30 TABLET, FILM COATED, EXTENDED RELEASE ORAL at 09:04

## 2021-12-22 RX ADMIN — SERTRALINE HYDROCHLORIDE 75 MG: 50 TABLET ORAL at 09:01

## 2021-12-22 RX ADMIN — GABAPENTIN 100 MG: 100 CAPSULE ORAL at 22:03

## 2021-12-22 RX ADMIN — SEVELAMER HYDROCHLORIDE 1600 MG: 800 TABLET, FILM COATED PARENTERAL at 12:31

## 2021-12-22 RX ADMIN — DEXTROSE MONOHYDRATE 25 ML: 500 INJECTION PARENTERAL at 07:07

## 2021-12-22 RX ADMIN — INSULIN LISPRO 3 UNITS: 100 INJECTION, SOLUTION INTRAVENOUS; SUBCUTANEOUS at 09:04

## 2021-12-22 RX ADMIN — CINACALCET 30 MG: 30 TABLET, FILM COATED ORAL at 09:03

## 2021-12-22 RX ADMIN — INSULIN LISPRO 3 UNITS: 100 INJECTION, SOLUTION INTRAVENOUS; SUBCUTANEOUS at 12:29

## 2021-12-22 RX ADMIN — INSULIN LISPRO 3 UNITS: 100 INJECTION, SOLUTION INTRAVENOUS; SUBCUTANEOUS at 17:22

## 2021-12-22 RX ADMIN — OXYCODONE HYDROCHLORIDE AND ACETAMINOPHEN 1 TABLET: 5; 325 TABLET ORAL at 22:03

## 2021-12-22 RX ADMIN — Medication 16 G: at 06:44

## 2021-12-22 RX ADMIN — SEVELAMER HYDROCHLORIDE 1600 MG: 800 TABLET, FILM COATED PARENTERAL at 17:20

## 2021-12-22 RX ADMIN — DEXTROSE MONOHYDRATE 25 ML: 500 INJECTION PARENTERAL at 07:13

## 2021-12-22 RX ADMIN — SEVELAMER HYDROCHLORIDE 1600 MG: 800 TABLET, FILM COATED PARENTERAL at 09:01

## 2021-12-22 RX ADMIN — CARVEDILOL 25 MG: 25 TABLET, FILM COATED ORAL at 09:01

## 2021-12-22 RX ADMIN — GABAPENTIN 100 MG: 100 CAPSULE ORAL at 09:01

## 2021-12-22 RX ADMIN — WARFARIN SODIUM 9 MG: 7.5 TABLET ORAL at 17:20

## 2021-12-22 RX ADMIN — OXYCODONE HYDROCHLORIDE AND ACETAMINOPHEN 1 TABLET: 5; 325 TABLET ORAL at 15:38

## 2021-12-22 RX ADMIN — INSULIN LISPRO 2 UNITS: 100 INJECTION, SOLUTION INTRAVENOUS; SUBCUTANEOUS at 09:05

## 2021-12-22 RX ADMIN — NYSTATIN: 100000 POWDER TOPICAL at 09:04

## 2021-12-22 RX ADMIN — ALPRAZOLAM 0.25 MG: 0.25 TABLET ORAL at 22:03

## 2021-12-22 NOTE — ASSESSMENT & PLAN NOTE
· Presented with worsening signs of infection   · XR and MRI findings consistent with osteomyelitis of the 1st metatarsal and proximal phalanx   · Antibiotics as per infectious disease   · Podiatry following, surgical plan TBD  · Local wound care

## 2021-12-22 NOTE — ASSESSMENT & PLAN NOTE
Lab Results   Component Value Date    HGBA1C 7 9 (H) 09/30/2021       Recent Labs     12/22/21  0701 12/22/21  0731 12/22/21  0900 12/22/21  1120   POCGLU 43* 220* 284* 117       Blood Sugar Average: Last 72 hrs:  (P) 157     · Continue home insulin regimen: 20 units Lantus at bedtime, 3 units with meals  · Monitor accuchecks and adjust as needed   · Diabetic diet

## 2021-12-22 NOTE — ASSESSMENT & PLAN NOTE
· Continue Coreg 25 mg b i d , Procardia 30 mg daily, may need to consider increasing 60 mg as patient's blood pressure is elevated  · P r n  hydralazine  · Ensure adequate pain control

## 2021-12-22 NOTE — MALNUTRITION/BMI
This medical record reflects one or more clinical indicators suggestive of malnutrition  BMI Findings:  Adult BMI Classifications: Morbid Obesity 40-44 9     Body mass index is 43 41 kg/m²  See Nutrition note dated 12/22/21 for additional details  Completed nutrition assessment is viewable in the nutrition documentation

## 2021-12-22 NOTE — PHYSICAL THERAPY NOTE
Physical Therapy Cancellation Note    Patient's Name: Arsalan Ambrosio       12/22/21 0932   PT Last Visit   PT Visit Date 12/22/21   Note Type   Note type Evaluation; Cancelled Session   Cancel Reasons Other       Orders received, chart reviewed  Pt admit with R foot ulcer  Noted that pt is pending likely 1st ray amputation with podiatry  Will hold PT at this time and follow for PT evaluation/treatment as medically appropriate  Thank you       Alexus Lugo, PT, DPT

## 2021-12-22 NOTE — PROGRESS NOTES
[facility] Podiatry - Progress Note  Patient: Vinod Walton 49 y o  female   MRN: [id number]  PCP: Marlo Maurer MD  Unit/Bed#: [facility] 933-01 Encounter: [id number]  Date Of Visit: 21    ASSESSMENT:    Vinod Walton is a 49 y o  female with:    1  Right medial 1st MTPJ diabetic ulceration - [place] 3 - POA  2  T2DM  3  ESRD on HD  4  Obesity        PLAN:    · Plan for OR tomorrow with Dr Amy Ybarra, for right foot tarsometatarsal amputation  NPO at midnight, consent to be signed with surgeon prior to OR  The patient was agreeable to the procedure  Benefits, risks, complications and concerns of procedure discussed with the patients understanding  · R foot MRI reviewed, osteomyelitis of the 1st metatarsal and proximal phalanx  · Continue local wound care, adaptic, DSD, appreciate nursing assistance with dressing changes  · Elevation on green foam wedges or pillows when non-ambulatory  · Rest of care per primary team     Weight bearing status: as tolerated      SUBJECTIVE:     The patient was seen, evaluated, and assessed at bedside today  The patient was awake, alert, and in no acute distress  No acute events overnight  The patient reports minimal pain to R great toe  Patient denies N/V/F/chills/SOB/CP  OBJECTIVE:     Vitals:   BP (!) 187/82   Pulse 73   Temp 97 7 °F (36 5 °C)   Resp 18   Ht 5' 6" (1 676 m)   Wt 122 kg (268 lb 15 4 oz)   LMP 2016 (Exact Date)   SpO2 94%   BMI 43 41 kg/m²     Temp (24hrs), Av 9 °F (36 6 °C), Min:97 7 °F (36 5 °C), Max:98 1 °F (36 7 °C)      Physical Exam:     General:  Alert, cooperative, and in no distress  Lower extremity exam:  Cardiovascular status at baseline  Neurological status at baseline  Musculoskeletal status at baseline  No calf tenderness noted       Lower extremity wound(s) as noted below:  Wound #: 1  Location: R medial 1st MTPJ   Length 1cm: Width 1cm: Depth 1cm:   Deepest Tissue Noted in Base: bone  Probe to Bone: Yes  Peripheral Skin Description: Attached, rolled, hyperkeratotic  Granulation: 80% Fibrotic Tissue: 20% Necrotic Tissue: 0%   Drainage Amount: minimal, serosanguinous, purulent  Signs of Infection: Yes, confirmed OM on MRI, erythema    Clinical Images 12/22/21:          Additional Data:     Labs:    Results from last 7 days   Lab Units 12/22/21  0543   WBC Thousand/uL 5 61   HEMOGLOBIN g/dL 7 1*   HEMATOCRIT % 22 0*   PLATELETS Thousands/uL 191   NEUTROS PCT % 67   LYMPHS PCT % 15   MONOS PCT % 10   EOS PCT % 6     Results from last 7 days   Lab Units 12/22/21  0543 12/21/21  0842 12/21/21  0842   POTASSIUM mmol/L 4 6   < > 4 5   CHLORIDE mmol/L 105   < > 103   CO2 mmol/L 28   < > 25   BUN mg/dL 41*   < > 51*   CREATININE mg/dL 6 59*   < > 8 84*   CALCIUM mg/dL 8 1*   < > 8 2*   ALK PHOS U/L  --   --  141*   ALT U/L  --   --  14   AST U/L  --   --  12    < > = values in this interval not displayed  Results from last 7 days   Lab Units 12/22/21  0543   INR  3 19*       * I Have Reviewed All Lab Data Listed Above  Recent Cultures (last 7 days):     Results from last 7 days   Lab Units 12/20/21 1954 12/20/21 1953 12/20/21 1952   BLOOD CULTURE   --  No Growth at 24 hrs  No Growth at 24 hrs  GRAM STAIN RESULT  Rare Polys  No bacteria seen  --   --    WOUND CULTURE  2+ Growth of Methicillin Resistant Staphylococcus aureus*  --   --      Results from last 7 days   Lab Units 12/20/21 1954   ANAEROBIC CULTURE  No anaerobes isolated       Imaging: I have personally reviewed pertinent films in PACS  EKG, Pathology, and Other Studies: I have personally reviewed pertinent reports  ** Please Note: Portions of the record may have been created with voice recognition software  Occasional wrong word or "sound a like" substitutions may have occurred due to the inherent limitations of voice recognition software  Read the chart carefully and recognize, using context, where substitutions have occurred   **

## 2021-12-22 NOTE — PROGRESS NOTES
Vancomycin IV Pharmacy-to-Dose Consultation    Payton Blackwell is a 49 y o  female who is currently receiving Vancomycin IV with management by the Pharmacy Consult service  Assessment/Plan:  The patient was reviewed  Renal function is stable and no signs or symptoms of nephrotoxicity and/or infusion reactions were documented in the chart  Based on todays assessment, continue current vancomycin (day # 2) dosing of 750 mg IV after HD on [place] with a plan for pre-HD random to be drawn at 0600 on 12/25 prior to 3rd dose with a pre-HD goal of 20-25 (equal to 15-20 post-HD)  We will continue to follow the patients culture results and clinical progress daily      Toya Rucker, Pharmacist

## 2021-12-22 NOTE — PROGRESS NOTES
Progress Note - Infectious Disease   Mitch Rodrigez 49 y o  female MRN: [id number]  Unit/Bed#: [facility] 933-01 Encounter: [id number]      Impression/Recommendations:  1  Right foot cellulitis  Due to #2  Blood cultures negative  Clinically stable without sepsis  Improving  Rec:  · Continue antibiotics as below  · Follow up blood cultures from admission  · Follow temperatures and WBC closely  · Supportive care as per the primary service     2  Chronic right foot DM ulcer with 1st MT/toe osteomyelitis  In setting of #3  PTB by Podiatry  Xray, MRI show bone infection  Wound culture with MRSA  Rec:  · Continue vancomycin  · Pharmacy consult for vancomycin dosing  · Await final surgical plan from Podiatry     3  Recent 1st MTPJ septic arthritis  2021  Status post washout  Fluid aspirate with MRSA, OR cultures with Enterococcus, lactobacillus, Clostridium  Status post 6 weeks IV antibiotics through   Now complicated by above      4   Poorly controlled DM with DPN  Recent A1c 2021 7 9  Risk factor for above      5  ESRD on HD  Via left AVF  Rec:  · Dose adjust antibiotics for HD  · Nephrology follow-up ongoing     The above impression and plan was discussed in detail with the patient      Antibiotics:  Vancomycin #2    Subjective:  Patient seen on AM rounds  Denies fevers, sweats, nausea, vomiting, or diarrhea  Chills resolved and redness/heat of right foot improving  24 Hour Events:  No documented fevers, chills, sweats, nausea, vomiting, or diarrhea      Objective:  Vitals:  Temp:  [97 6 °F (36 4 °C)-98 1 °F (36 7 °C)] 97 7 °F (36 5 °C)  HR:  [68-73] 73  Resp:  [16-18] 18  BP: (119-187)/(51-82) 187/82  SpO2:  [94 %] 94 %  Temp (24hrs), Av 8 °F (36 6 °C), Min:97 6 °F (36 4 °C), Max:98 1 °F (36 7 °C)  Current: Temperature: 97 7 °F (36 5 °C)    Physical Exam:   General:  No acute distress  Psychiatric:  Awake and alert  Pulmonary:  Normal respiratory excursion without accessory muscle use  Abdomen:  Soft, nontender  Extremities:  Right foot wrap intact  Skin:  No rashes    Lab Results:  I have personally reviewed pertinent labs  Results from last 7 days   Lab Units 12/22/21  0543 12/21/21  0842 12/20/21  1619   POTASSIUM mmol/L 4 6 4 5 4 4   CHLORIDE mmol/L 105 103 103   CO2 mmol/L 28 25 26   BUN mg/dL 41* 51* 44*   CREATININE mg/dL 6 59* 8 84* 7 78*   EGFR ml/min/1 73sq m 6 4 5   CALCIUM mg/dL 8 1* 8 2* 9 0   AST U/L  --  12  --    ALT U/L  --  14  --    ALK PHOS U/L  --  141*  --      Results from last 7 days   Lab Units 12/22/21  0543 12/21/21  0842 12/20/21  1619   WBC Thousand/uL 5 61 5 69 6 46   HEMOGLOBIN g/dL 7 1* 7 5* 7 8*   PLATELETS Thousands/uL 191 203 195     Results from last 7 days   Lab Units 12/20/21 1954 12/20/21 1953 12/20/21 1952   BLOOD CULTURE   --  No Growth at 24 hrs  No Growth at 24 hrs  GRAM STAIN RESULT  Rare Polys  No bacteria seen  --   --    WOUND CULTURE  2+ Growth of Methicillin Resistant Staphylococcus aureus*  --   --        Imaging Studies:   I have personally reviewed pertinent imaging study reports and images in PACS  EKG, Pathology, and Other Studies:   I have personally reviewed pertinent reports

## 2021-12-22 NOTE — OCCUPATIONAL THERAPY NOTE
Occupational Therapy Cancel Note         Patient Name: Vinod Walton  WRIHX'C Date: 12/22/2021 12/22/21 1532   OT Last Visit   OT Visit Date 12/22/21   Note Type   Note type Evaluation; Cancelled Session   Cancel Reasons Patient to operating room       OT orders received  Chart reviewed  Per podiatry note, "Plan for OR tomorrow   for right foot tarsometatarsal amputation " Will continue to follow and see pt as appropriate post-op      Gia Coates MS, OTR/L

## 2021-12-22 NOTE — PROGRESS NOTES
[address] [facility] NOTE   Castro Oquendo 49 y o  female MRN: [id number]  Unit/Bed#: [facility] 933-01 Encounter: [id number]  Reason for Consult: ESRD on HD    ASSESSMENT and PLAN:  1  ESRD on HD ([address] TTS):   · Next HD is tomorrow  2  Access: L arm AVF     3  Hypertension:   · BP is above goal   · Current medications: Carvedilol 25 mg BID, nifedipine 30 mg OD  · Add back Torsemide 100 mg daily  · EDW is 121 1 kg  Planning to keep EDW the same       4  Anemia:   · Hemoglobin is below goal  · Not on MITCHELL due to history PE   · Recommend PRBC transfusion as needed for Hgb <7 0     5  Mineral and bone disease:   · Continue sevelamer 1600 mg TID for hyperphosphatemia  · Continue Sensipar 30 mg daily for secondary hyperparathyroidism  · Not on Hectorol  · Continue renal diet     6  Right foot ulcer:  · MRI confirms OM  · On Cefazolin  · Podiatry and ID following  DISPOSITION:  · HD tomorrow  · No need for daily labs from renal standpoint- can be done only on HD days  · Minimize lab draws to avoid need for PRBC tx  SUBJECTIVE / 24H INTERVAL HISTORY:  Had to come off early yesterday on HD due to anxiety  No nausea or vomiting  R foot pain tolerable  OBJECTIVE:  Current Weight: Weight - Scale: 122 kg (268 lb 15 4 oz)  Vitals:    12/21/21 1100 12/21/21 1539 12/22/21 0500 12/22/21 0859   BP: (!) 175/51  165/69 (!) 187/82   BP Location: Right arm  Right arm    Pulse: 73  68 73   Resp: 16 18  18   Temp: 97 6 °F (36 4 °C) 98 1 °F (36 7 °C)  97 7 °F (36 5 °C)   TempSrc: Oral      SpO2:    94%   Weight:       Height:           Intake/Output Summary (Last 24 hours) at 12/22/2021 1027  Last data filed at 12/21/2021 1700  Gross per 24 hour   Intake 780 ml   Output 2281 ml   Net -1501 ml     General: conscious, cooperative, no distress  Skin: dry  Eyes: pale conjunctivae  ENT: moist mucous membranes  Chest/Lungs: equal chest expansion, clear breath sounds    CVS: distinct heart sounds, normal rate, regular rhythm, no rub  Abdomen: soft, non tender, non distended, normal bowel sounds  Extremities: edema of R leg  : no hernandez catheter  Neuro: awake, alert     Psych: appropriate affect    Medications:    Current Facility-Administered Medications:     acetaminophen (TYLENOL) tablet 650 mg, 650 mg, Oral, Q6H PRN, Hetul Andrea, DO    albuterol (PROVENTIL HFA,VENTOLIN HFA) inhaler 2 puff, 2 puff, Inhalation, Q6H PRN, Hetul Andrea, DO    ALPRAZolam (XANAX) tablet 0 25 mg, 0 25 mg, Oral, 4x Daily PRN, Ferdinand Banai, DO, 0 25 mg at 12/21/21 2207    ammonium lactate (LAC-HYDRIN) 12 % cream, , Topical, BID PRN, Breezy Perez PA-C, Given at 12/21/21 2207    carvedilol (COREG) tablet 25 mg, 25 mg, Oral, BID With Meals, Hetul Andrea, DO, 25 mg at 12/22/21 0901    cinacalcet (SENSIPAR) tablet 30 mg, 30 mg, Oral, Daily, Hetul Andrea, DO, 30 mg at 12/22/21 0903    gabapentin (NEURONTIN) capsule 100 mg, 100 mg, Oral, TID, Ferdinand Banai, DO, 100 mg at 12/22/21 0901    hydrALAZINE (APRESOLINE) injection 5 mg, 5 mg, Intravenous, Q6H PRN, Hetul Andrea, DO, 5 mg at 12/21/21 0047    insulin glargine (LANTUS) subcutaneous injection 20 Units 0 2 mL, 20 Units, Subcutaneous, HS, Hetul Andrea, DO, 20 Units at 12/21/21 2214    insulin lispro (HumaLOG) 100 units/mL subcutaneous injection 1-5 Units, 1-5 Units, Subcutaneous, TID AC, 2 Units at 12/22/21 0905 **AND** Fingerstick Glucose (POCT), , , TID AC, Hetul Andrea, DO    insulin lispro (HumaLOG) 100 units/mL subcutaneous injection 3 Units, 3 Units, Subcutaneous, TID With Meals, Hetul Andrea, DO, 3 Units at 12/22/21 0904    levothyroxine tablet 50 mcg, 50 mcg, Oral, Daily, Hetul Andrea, DO, 50 mcg at 12/22/21 0901    loratadine (CLARITIN) tablet 10 mg, 10 mg, Oral, Daily, Hetul Andrea, DO, 10 mg at 12/22/21 0901    melatonin tablet 3 mg, 3 mg, Oral, HS, Hetul Andrea, DO, 3 mg at 12/21/21 2207    NIFEdipine (PROCARDIA XL) 24 hr tablet 30 mg, 30 mg, Oral, Daily, Hetul Andrea, DO, 30 mg at 12/22/21 7437    nystatin (MYCOSTATIN) powder, , Topical, BID, Hetul Andrea, DO, Given at 12/22/21 0904    ondansetron (ZOFRAN) injection 4 mg, 4 mg, Intravenous, Q6H PRN, Hetul Andrea, DO    oxyCODONE-acetaminophen (PERCOCET) 5-325 mg per tablet 1 tablet, 1 tablet, Oral, Q6H PRN, Hetul Andrea, DO, 1 tablet at 12/21/21 2016    pantoprazole (PROTONIX) EC tablet 40 mg, 40 mg, Oral, Early Morning, Hetul Andrea, DO, 40 mg at 12/21/21 0533    polyethylene glycol (MIRALAX) packet 17 g, 17 g, Oral, Daily, Hetul Andrea, DO, 17 g at 12/22/21 0856    senna (SENOKOT) tablet 17 2 mg, 2 tablet, Oral, HS PRN, Hetul Andrea, DO    sertraline (ZOLOFT) tablet 75 mg, 75 mg, Oral, Daily, Ferdinand Banai, DO, 75 mg at 12/22/21 0901    sevelamer (RENAGEL) tablet 1,600 mg, 1,600 mg, Oral, TID With Meals, Hetul Andrea, DO, 1,600 mg at 12/22/21 0901    [START ON 12/23/2021] vancomycin (VANCOCIN) IVPB (premix in dextrose) 750 mg 150 mL, 10 mg/kg (Adjusted), Intravenous, After Dialysis, Elsa Barraza MD    warfarin (COUMADIN) tablet 9 mg, 9 mg, Oral, Daily (warfarin), Hetul Andrea, DO, 9 mg at 12/21/21 1613    Laboratory Results:  Results from last 7 days   Lab Units 12/22/21  0543 12/21/21  0842 12/20/21  1619   WBC Thousand/uL 5 61 5 69 6 46   HEMOGLOBIN g/dL 7 1* 7 5* 7 8*   HEMATOCRIT % 22 0* 23 0* 24 1*   PLATELETS Thousands/uL 191 203 195   POTASSIUM mmol/L 4 6 4 5 4 4   CHLORIDE mmol/L 105 103 103   CO2 mmol/L 28 25 26   BUN mg/dL 41* 51* 44*   CREATININE mg/dL 6 59* 8 84* 7 78*   CALCIUM mg/dL 8 1* 8 2* 9 0   MAGNESIUM mg/dL  --  2 1  --    PHOSPHORUS mg/dL  --  4 1  --

## 2021-12-22 NOTE — ASSESSMENT & PLAN NOTE
Lab Results   Component Value Date    EGFR 6 12/22/2021    EGFR 4 12/21/2021    EGFR 5 12/20/2021    CREATININE 6 59 (H) 12/22/2021    CREATININE 8 84 (H) 12/21/2021    CREATININE 7 78 (H) 12/20/2021   Management per Nephrology

## 2021-12-22 NOTE — PLAN OF CARE
Problem: Knowledge Deficit  Goal: Patient/family/caregiver demonstrates understanding of disease process, treatment plan, medications, and discharge instructions  Description: Complete learning assessment and assess knowledge base    Interventions:  - Provide teaching at level of understanding  - Provide teaching via preferred learning methods  12/22/2021 0914 by Leetta Buerger, RN  Outcome: Progressing  12/22/2021 0914 by Leetta Buerger, RN  Outcome: Progressing     Problem: SAFETY ADULT  Goal: Patient will remain free of falls  Description: INTERVENTIONS:  - Educate patient/family on patient safety including physical limitations  - Instruct patient to call for assistance with activity   - Consult OT/PT to assist with strengthening/mobility   - Keep Call bell within reach  - Keep bed low and locked with side rails adjusted as appropriate  - Keep care items and personal belongings within reach  - Initiate and maintain comfort rounds  - Make Fall Risk Sign visible to staff  - Offer Toileting every  Hours, in advance of need  - Initiate/Maintain alarm  - Obtain necessary fall risk management equipment:   - Apply yellow socks and bracelet for high fall risk patients  - Consider moving patient to room near nurses station  12/22/2021 0914 by Leetta Buerger, RN  Outcome: Progressing  12/22/2021 0914 by Leetta Buerger, RN  Outcome: Progressing  Goal: Maintain or return to baseline ADL function  Description: INTERVENTIONS:  -  Assess patient's ability to carry out ADLs; assess patient's baseline for ADL function and identify physical deficits which impact ability to perform ADLs (bathing, care of mouth/teeth, toileting, grooming, dressing, etc )  - Assess/evaluate cause of self-care deficits   - Assess range of motion  - Assess patient's mobility; develop plan if impaired  - Assess patient's need for assistive devices and provide as appropriate  - Encourage maximum independence but intervene and supervise when necessary  - Involve family in performance of ADLs  - Assess for home care needs following discharge   - Consider OT consult to assist with ADL evaluation and planning for discharge  - Provide patient education as appropriate  12/22/2021 0914 by Ramana Basilio RN  Outcome: Progressing  12/22/2021 0914 by Ramana Basilio RN  Outcome: Progressing  Goal: Maintains/Returns to pre admission functional level  Description: INTERVENTIONS:  - Perform BMAT or MOVE assessment daily    - Set and communicate daily mobility goal to care team and patient/family/caregiver  - Collaborate with rehabilitation services on mobility goals if consulted  - Perform Range of Motion  times a day  - Reposition patient every  hours    - Dangle patient  times a day  - Stand patient times a day  - Ambulate patient times a day  - Out of bed to chair  times a day   - Out of bed for meals  times a day  - Out of bed for toileting  - Record patient progress and toleration of activity level   12/22/2021 0914 by Ramana Basilio RN  Outcome: Progressing  12/22/2021 0914 by Ramana Basilio RN  Outcome: Progressing

## 2021-12-22 NOTE — PROGRESS NOTES
[address] [facility]  Progress Note Karolina Abreu 1965, 49 y o  female MRN: [id number]  [facility] Encounter: [id number]  Primary Care Provider: Hanna Davis MD   Date and time admitted to hospital: 12/20/2021  3:07 PM    * Foot ulcer, right ([address] )  Assessment & Plan  · Presented with worsening signs of infection   · XR and MRI findings consistent with osteomyelitis of the 1st metatarsal and proximal phalanx   · Antibiotics as per infectious disease   · Podiatry following, surgical plan TBD  · Local wound care     Type 2 diabetes mellitus with neurologic complication, with long-term current use of insulin [facility])  Assessment & Plan  Lab Results   Component Value Date    HGBA1C 7 9 (H) 09/30/2021       Recent Labs     12/22/21  0701 12/22/21  0731 12/22/21  0900 12/22/21  1120   POCGLU 43* 220* 284* 117       Blood Sugar Average: Last 72 hrs:  (P) 157     · Continue home insulin regimen: 20 units Lantus at bedtime, 3 units with meals  · Monitor accuchecks and adjust as needed   · Diabetic diet     ESRD on hemodialysis [facility])  Assessment & Plan  Lab Results   Component Value Date    EGFR 6 12/22/2021    EGFR 4 12/21/2021    EGFR 5 12/20/2021    CREATININE 6 59 (H) 12/22/2021    CREATININE 8 84 (H) 12/21/2021    CREATININE 7 78 (H) 12/20/2021   Management per Nephrology    History of DVT (deep vein thrombosis)/PE  Assessment & Plan  · Continue with Coumadin  · INR 3 1 this morning, will continue to trend     Hypertension  Assessment & Plan  · Continue Coreg 25 mg b i d , Procardia 30 mg daily, may need to consider increasing 60 mg as patient's blood pressure is elevated  · P r n  hydralazine  · Ensure adequate pain control     Hyperlipemia  Assessment & Plan  Continue atorvastatin 20 mg daily      VTE Pharmacologic Prophylaxis:   Low Risk (Score 0-2) - Encourage Ambulation  Patient Centered Rounds: I performed bedside rounds with nursing staff today    Discussions with Specialists or Other Care Team Provider: primary RN    Education and Discussions with Family / Patient: Patient declined call to   Time Spent for Care: 15 minutes  More than 50% of total time spent on counseling and coordination of care as described above  Current Length of Stay: 2 day(s)  Current Patient Status: Inpatient   Certification Statement: The patient will continue to require additional inpatient hospital stay due to pending podiatry plans   Discharge Plan: Anticipate discharge in >72 hrs to discharge location to be determined pending rehab evaluations  Code Status: Level 1 - Full Code    Subjective:   Patient offers no new complaints today, specifically denies pain  States she was told she needs amputation  Objective:     Vitals:   Temp (24hrs), Av 9 °F (36 6 °C), Min:97 7 °F (36 5 °C), Max:98 1 °F (36 7 °C)    Temp:  [97 7 °F (36 5 °C)-98 1 °F (36 7 °C)] 97 7 °F (36 5 °C)  HR:  [68-73] 73  Resp:  [18] 18  BP: (165-187)/(69-82) 187/82  SpO2:  [94 %] 94 %  Body mass index is 43 41 kg/m²  Input and Output Summary (last 24 hours): Intake/Output Summary (Last 24 hours) at 2021 1336  Last data filed at 2021 1324  Gross per 24 hour   Intake 480 ml   Output --   Net 480 ml       Physical Exam:   Physical Exam  Vitals and nursing note reviewed  Constitutional:       General: She is not in acute distress  Cardiovascular:      Rate and Rhythm: Normal rate and regular rhythm  Pulses: Normal pulses  Heart sounds: No murmur heard  Pulmonary:      Effort: Pulmonary effort is normal  No respiratory distress  Breath sounds: No wheezing or rales  Abdominal:      General: Abdomen is flat  There is no distension  Palpations: Abdomen is soft  Tenderness: There is no abdominal tenderness  Skin:     Comments: R foot dressing c/d/i   Neurological:      General: No focal deficit present        Mental Status: She is alert and oriented to person, place, and time  Additional Data:     Labs:  Results from last 7 days   Lab Units 12/22/21  0543   WBC Thousand/uL 5 61   HEMOGLOBIN g/dL 7 1*   HEMATOCRIT % 22 0*   PLATELETS Thousands/uL 191   NEUTROS PCT % 67   LYMPHS PCT % 15   MONOS PCT % 10   EOS PCT % 6     Results from last 7 days   Lab Units 12/22/21  0543 12/21/21  0842 12/21/21  0842   SODIUM mmol/L 137   < > 136   POTASSIUM mmol/L 4 6   < > 4 5   CHLORIDE mmol/L 105   < > 103   CO2 mmol/L 28   < > 25   BUN mg/dL 41*   < > 51*   CREATININE mg/dL 6 59*   < > 8 84*   ANION GAP mmol/L 4   < > 8   CALCIUM mg/dL 8 1*   < > 8 2*   ALBUMIN g/dL  --   --  2 7*   TOTAL BILIRUBIN mg/dL  --   --  0 46   ALK PHOS U/L  --   --  141*   ALT U/L  --   --  14   AST U/L  --   --  12   GLUCOSE RANDOM mg/dL 59*   < > 124    < > = values in this interval not displayed  Results from last 7 days   Lab Units 12/22/21  0543   INR  3 19*     Results from last 7 days   Lab Units 12/22/21  1120 12/22/21  0900 12/22/21  0731 12/22/21  0701 12/22/21  0641 12/21/21  2214 12/21/21  1602 12/21/21  1245 12/21/21  0748 12/20/21  2227   POC GLUCOSE mg/dl 117 284* 220* 43* 44* 139 176* 157* 153* 237*               Lines/Drains:  Invasive Devices  Report    Peripheral Intravenous Line            Peripheral IV 12/22/21 Dorsal (posterior); Right Forearm <1 day          Line            Hemodialysis AV Fistula 02/20/18 Left Forearm 1400 days                      Imaging: Reviewed radiology reports from this admission including: MRI foot    Recent Cultures (last 7 days):   Results from last 7 days   Lab Units 12/20/21 1954 12/20/21 1953 12/20/21 1952   BLOOD CULTURE   --  No Growth at 24 hrs  No Growth at 24 hrs     GRAM STAIN RESULT  Rare Polys  No bacteria seen  --   --    WOUND CULTURE  2+ Growth of Methicillin Resistant Staphylococcus aureus*  --   --        Last 24 Hours Medication List:   Current Facility-Administered Medications   Medication Dose Route Frequency Provider Last Rate    acetaminophen  650 mg Oral Q6H PRN Hetul Andrea, DO      albuterol  2 puff Inhalation Q6H PRN Hetul Andrea, DO      ALPRAZolam  0 25 mg Oral 4x Daily PRN Ferdniand Banai, DO      ammonium lactate   Topical BID PRN Thanh Mckenna PA-C      carvedilol  25 mg Oral BID With Meals Hetul Pushpa Schirmer, DO      cinacalcet  30 mg Oral Daily Hetul Andrea, DO      gabapentin  100 mg Oral TID Ferdinand Banai, DO      hydrALAZINE  5 mg Intravenous Q6H PRN Hetul Andrea, DO      insulin glargine  20 Units Subcutaneous HS Hetul Andrea, DO      insulin lispro  1-5 Units Subcutaneous TID AC Hetul Andrea, DO      insulin lispro  3 Units Subcutaneous TID With Meals Hetul Andrea, DO      levothyroxine  50 mcg Oral Daily Hetul Andrea, DO      loratadine  10 mg Oral Daily Hetul Andrea, DO      melatonin  3 mg Oral HS Hetul Andrea, DO      NIFEdipine  30 mg Oral Daily Hetul Andrea, DO      nystatin   Topical BID Hetul Andrea, DO      ondansetron  4 mg Intravenous Q6H PRN Hetul Andrea, DO      oxyCODONE-acetaminophen  1 tablet Oral Q6H PRN Hetul Andrea, DO      pantoprazole  40 mg Oral Early Morning Hetul Andrea, DO      polyethylene glycol  17 g Oral Daily Hetul Andrea, DO      senna  2 tablet Oral HS PRN Hetul Andrea, DO      sertraline  75 mg Oral Daily Ferdinand Banai, DO      sevelamer  1,600 mg Oral TID With Meals Hetul Andrea, DO      [START ON 12/23/2021] vancomycin  10 mg/kg (Adjusted) Intravenous After Dialysis MD Deneen Davis warfarin  9 mg Oral Daily (warfarin) Hetul Andrea,           Today, Patient Was Seen By: Gabrielle Hernandez PA-C    **Please Note: This note may have been constructed using a voice recognition system  **

## 2021-12-23 ENCOUNTER — ANESTHESIA EVENT (INPATIENT)
Dept: PERIOP | Facility: HOSPITAL | Age: 56
DRG: 617 | End: 2021-12-23
Payer: MEDICARE

## 2021-12-23 ENCOUNTER — ANESTHESIA (INPATIENT)
Dept: PERIOP | Facility: HOSPITAL | Age: 56
DRG: 617 | End: 2021-12-23
Payer: MEDICARE

## 2021-12-23 ENCOUNTER — ANESTHESIA EVENT (INPATIENT)
Dept: ANESTHESIOLOGY | Facility: HOSPITAL | Age: 56
DRG: 617 | End: 2021-12-23
Payer: MEDICARE

## 2021-12-23 ENCOUNTER — ANESTHESIA (INPATIENT)
Dept: ANESTHESIOLOGY | Facility: HOSPITAL | Age: 56
DRG: 617 | End: 2021-12-23
Payer: MEDICARE

## 2021-12-23 ENCOUNTER — APPOINTMENT (INPATIENT)
Dept: DIALYSIS | Facility: HOSPITAL | Age: 56
DRG: 617 | End: 2021-12-23
Attending: INTERNAL MEDICINE
Payer: MEDICARE

## 2021-12-23 PROBLEM — D63.8 ANEMIA OF CHRONIC DISEASE: Status: ACTIVE | Noted: 2019-08-26

## 2021-12-23 LAB
ANION GAP SERPL CALCULATED.3IONS-SCNC: 8 MMOL/L (ref 4–13)
BASOPHILS # BLD AUTO: 0.03 THOUSANDS/ΜL (ref 0–0.1)
BASOPHILS NFR BLD AUTO: 0 % (ref 0–1)
BUN SERPL-MCNC: 21 MG/DL (ref 5–25)
CALCIUM SERPL-MCNC: 9 MG/DL (ref 8.3–10.1)
CHLORIDE SERPL-SCNC: 101 MMOL/L (ref 100–108)
CO2 SERPL-SCNC: 27 MMOL/L (ref 21–32)
CREAT SERPL-MCNC: 3.66 MG/DL (ref 0.6–1.3)
EOSINOPHIL # BLD AUTO: 0.26 THOUSAND/ΜL (ref 0–0.61)
EOSINOPHIL NFR BLD AUTO: 4 % (ref 0–6)
ERYTHROCYTE [DISTWIDTH] IN BLOOD BY AUTOMATED COUNT: 14.6 % (ref 11.6–15.1)
GFR SERPL CREATININE-BSD FRML MDRD: 13 ML/MIN/1.73SQ M
GLUCOSE SERPL-MCNC: 124 MG/DL (ref 65–140)
GLUCOSE SERPL-MCNC: 130 MG/DL (ref 65–140)
GLUCOSE SERPL-MCNC: 147 MG/DL (ref 65–140)
GLUCOSE SERPL-MCNC: 197 MG/DL (ref 65–140)
GLUCOSE SERPL-MCNC: 253 MG/DL (ref 65–140)
HCT VFR BLD AUTO: 27 % (ref 34.8–46.1)
HGB BLD-MCNC: 8.8 G/DL (ref 11.5–15.4)
IMM GRANULOCYTES # BLD AUTO: 0.02 THOUSAND/UL (ref 0–0.2)
IMM GRANULOCYTES NFR BLD AUTO: 0 % (ref 0–2)
INR PPP: 3.44 (ref 0.84–1.19)
LYMPHOCYTES # BLD AUTO: 0.95 THOUSANDS/ΜL (ref 0.6–4.47)
LYMPHOCYTES NFR BLD AUTO: 13 % (ref 14–44)
MCH RBC QN AUTO: 30.3 PG (ref 26.8–34.3)
MCHC RBC AUTO-ENTMCNC: 32.6 G/DL (ref 31.4–37.4)
MCV RBC AUTO: 93 FL (ref 82–98)
MONOCYTES # BLD AUTO: 0.65 THOUSAND/ΜL (ref 0.17–1.22)
MONOCYTES NFR BLD AUTO: 9 % (ref 4–12)
NEUTROPHILS # BLD AUTO: 5.28 THOUSANDS/ΜL (ref 1.85–7.62)
NEUTS SEG NFR BLD AUTO: 74 % (ref 43–75)
NRBC BLD AUTO-RTO: 0 /100 WBCS
PLATELET # BLD AUTO: 249 THOUSANDS/UL (ref 149–390)
PMV BLD AUTO: 10.2 FL (ref 8.9–12.7)
POTASSIUM SERPL-SCNC: 4.4 MMOL/L (ref 3.5–5.3)
PROTHROMBIN TIME: 32.9 SECONDS (ref 11.6–14.5)
RBC # BLD AUTO: 2.9 MILLION/UL (ref 3.81–5.12)
SODIUM SERPL-SCNC: 136 MMOL/L (ref 136–145)
WBC # BLD AUTO: 7.19 THOUSAND/UL (ref 4.31–10.16)

## 2021-12-23 PROCEDURE — 90935 HEMODIALYSIS ONE EVALUATION: CPT | Performed by: INTERNAL MEDICINE

## 2021-12-23 PROCEDURE — 82948 REAGENT STRIP/BLOOD GLUCOSE: CPT

## 2021-12-23 PROCEDURE — 85610 PROTHROMBIN TIME: CPT | Performed by: INTERNAL MEDICINE

## 2021-12-23 PROCEDURE — 99232 SBSQ HOSP IP/OBS MODERATE 35: CPT | Performed by: INTERNAL MEDICINE

## 2021-12-23 PROCEDURE — 99024 POSTOP FOLLOW-UP VISIT: CPT | Performed by: PODIATRIST

## 2021-12-23 PROCEDURE — 85025 COMPLETE CBC W/AUTO DIFF WBC: CPT | Performed by: INTERNAL MEDICINE

## 2021-12-23 PROCEDURE — 80048 BASIC METABOLIC PNL TOTAL CA: CPT | Performed by: INTERNAL MEDICINE

## 2021-12-23 RX ORDER — TORSEMIDE 20 MG/1
100 TABLET ORAL DAILY
Status: DISCONTINUED | OUTPATIENT
Start: 2021-12-24 | End: 2022-01-12 | Stop reason: HOSPADM

## 2021-12-23 RX ORDER — POLYETHYLENE GLYCOL 3350 17 G/17G
17 POWDER, FOR SOLUTION ORAL DAILY PRN
Status: DISCONTINUED | OUTPATIENT
Start: 2021-12-23 | End: 2022-01-09

## 2021-12-23 RX ADMIN — ONDANSETRON 4 MG: 2 INJECTION INTRAMUSCULAR; INTRAVENOUS at 07:15

## 2021-12-23 RX ADMIN — ALPRAZOLAM 0.25 MG: 0.25 TABLET ORAL at 22:13

## 2021-12-23 RX ADMIN — Medication 3 MG: at 22:13

## 2021-12-23 RX ADMIN — OXYCODONE HYDROCHLORIDE AND ACETAMINOPHEN 1 TABLET: 5; 325 TABLET ORAL at 22:17

## 2021-12-23 RX ADMIN — ALPRAZOLAM 0.25 MG: 0.25 TABLET ORAL at 07:45

## 2021-12-23 RX ADMIN — CARVEDILOL 25 MG: 25 TABLET, FILM COATED ORAL at 18:29

## 2021-12-23 RX ADMIN — INSULIN GLARGINE 20 UNITS: 100 INJECTION, SOLUTION SUBCUTANEOUS at 22:13

## 2021-12-23 RX ADMIN — INSULIN LISPRO 1 UNITS: 100 INJECTION, SOLUTION INTRAVENOUS; SUBCUTANEOUS at 18:30

## 2021-12-23 RX ADMIN — GABAPENTIN 100 MG: 100 CAPSULE ORAL at 18:29

## 2021-12-23 RX ADMIN — OXYCODONE HYDROCHLORIDE AND ACETAMINOPHEN 1 TABLET: 5; 325 TABLET ORAL at 13:25

## 2021-12-23 RX ADMIN — INSULIN LISPRO 3 UNITS: 100 INJECTION, SOLUTION INTRAVENOUS; SUBCUTANEOUS at 18:31

## 2021-12-23 RX ADMIN — PHYTONADIONE 5 MG: 10 INJECTION, EMULSION INTRAMUSCULAR; INTRAVENOUS; SUBCUTANEOUS at 18:29

## 2021-12-23 RX ADMIN — SEVELAMER HYDROCHLORIDE 1600 MG: 800 TABLET, FILM COATED PARENTERAL at 18:29

## 2021-12-23 RX ADMIN — GABAPENTIN 100 MG: 100 CAPSULE ORAL at 22:13

## 2021-12-23 RX ADMIN — VANCOMYCIN HYDROCHLORIDE 750 MG: 750 INJECTION, SOLUTION INTRAVENOUS at 09:29

## 2021-12-23 NOTE — ASSESSMENT & PLAN NOTE
Plan XR foot noting "Bone destruction involving the medial aspect of the distal 1st metatarsal compatible with acute osteomyelitis"  Follow-up MRI noted to be "consistent with osteomyelitis of the 1st metatarsal and proximal phalanx"  Appreciate infectious disease input -> c/w IV Vancomycin regimen after dialysis sessions  Discussed with podiatry today -> postponed ray amputation due to supratherapeutic INR (likely to be performed in 2-3 days)  PRN pain control - supportive care otherwise

## 2021-12-23 NOTE — PROGRESS NOTES
Follow up Consultation    Nephrology   Cam Meeks 49 y o  female MRN: [id number]  Unit/Bed#: [facility] 933-01 Encounter: [id number]      Physician Requesting Consult: Eli Weaver MD        ASSESSMENT/PLAN:  61-year-old female with multiple comorbidities including hypertension, diabetes, hyperlipidemia, GERD, obesity, DVT and ESRD (TTS) admitted with right foot pain  Nephrology following for dialysis needs  ESRD:   gets usual dialysis at [address] on TTS schedule  access:  Left AV fistula  estimated dry weight:  121 1 kilos  last dialysis treatment on 12/23/2021  next dialysis treatment on 12/26/2021 based on holiday schedule  Aiming for UF close to 3 kilos with treatment today  check BMP, phosphorus with dialysis  renal diet when allowed diet order  avoid nephrotoxins, adjust meds to appropriate GFR    H&H/anemia:  most recent hemoglobin at 8 8 grams/deciliter  maintain hemoglobin 10-12 grams/deciliter  Recommendations:  Avoid IV iron due to concern for infection no MITCHELL due to history of PE    Acid-base electrolytes:  Sodium, phosphorus, potassium, acidosis to be corrected with dialysis    Blood pressure/hypertension:   Current medications: continue Coreg 25 mg p o  B i d  Procardia XL 30 mg p o  Q day, torsemide 100 mg p o  Q day   Recommendations:  continue without change increase UF with treatment  Bone mineral disease/secondary hyperparathyroidism of renal origin:   Recommendations: continue Renvela 2 tabs p o  T i d , Sensipar 30 mg p o  Q day  Follow-up intact PTH as an outpatient    Other medical problems:  Diabetes:  Management primary team, on insulin  Right foot ulcer/osteomyelitis:  Management per primary team   Follow-up with podiatry  Plan for OR for right foot transmetatarsal amputation  MRI confirmation  for osteomyelitis        Thanks for the consult  Will continue to follow  Please call with questions    Above-mentioned orders and Plan in terms of dialysis was discussed with the team in [address] MD, THIAGO, 2021, 12:21 PM        Objective :  Patient seen and examined while on hemodialysis at 8:10 a m  Aiming for UF close to 3 L tolerating dialysis well however having issues with recurrent diarrhea reports she is going for surgery later today  PHYSICAL EXAM  BP (!) 122/39 (BP Location: Right arm)   Pulse 80   Temp 97 5 °F (36 4 °C) (Oral)   Resp 16   Ht 5' 6" (1 676 m)   Wt 122 kg (268 lb 15 4 oz)   LMP 2016 (Exact Date)   SpO2 96%   BMI 43 41 kg/m²   Temp (24hrs), Av 7 °F (36 5 °C), Min:97 5 °F (36 4 °C), Max:98 1 °F (36 7 °C)        Intake/Output Summary (Last 24 hours) at 2021 1221  Last data filed at 2021 1030  Gross per 24 hour   Intake 1080 ml   Output 2862 ml   Net -1782 ml       I/O last 24 hours: In: 1080 [P O :580; I V :500]  Out: 2862 [Other:2862]      Current Weight: Weight - Scale: 122 kg (268 lb 15 4 oz)  First Weight: Weight - Scale: 122 kg (268 lb 15 4 oz)  Physical Exam  Vitals and nursing note reviewed  Constitutional:       General: She is not in acute distress  Appearance: Normal appearance  She is obese  She is not ill-appearing, toxic-appearing or diaphoretic  HENT:      Head: Normocephalic and atraumatic  Mouth/Throat:      Mouth: Mucous membranes are moist       Pharynx: Oropharynx is clear  No oropharyngeal exudate  Eyes:      General: No scleral icterus  Conjunctiva/sclera: Conjunctivae normal    Cardiovascular:      Rate and Rhythm: Normal rate  Heart sounds: Normal heart sounds  No friction rub  Pulmonary:      Effort: Pulmonary effort is normal  No respiratory distress  Breath sounds: Normal breath sounds  No stridor  No wheezing  Abdominal:      General: There is no distension  Palpations: Abdomen is soft  There is no mass  Tenderness: There is no abdominal tenderness  Musculoskeletal:         General: Swelling present        Cervical back: Normal range of motion and neck supple  Comments: Trace edema bilateral extremity edema   Skin:     General: Skin is warm  Coloration: Skin is not jaundiced  Neurological:      General: No focal deficit present  Mental Status: She is alert and oriented to person, place, and time  Psychiatric:         Mood and Affect: Mood normal          Behavior: Behavior normal              Review of Systems   Constitutional: Negative for chills, fatigue and fever  HENT: Negative for congestion  Respiratory: Negative for cough, shortness of breath and wheezing  Cardiovascular: Negative for leg swelling  Gastrointestinal: Positive for diarrhea  Negative for abdominal pain, constipation and vomiting  Musculoskeletal: Negative for back pain  Skin: Negative for rash  Neurological: Negative for headaches  Psychiatric/Behavioral: Negative for agitation and confusion  All other systems reviewed and are negative        Scheduled Meds:  Current Facility-Administered Medications   Medication Dose Route Frequency Provider Last Rate    acetaminophen  650 mg Oral Q6H PRN Hetul Andrea, DO      albuterol  2 puff Inhalation Q6H PRN Hetul Andrea, DO      ALPRAZolam  0 25 mg Oral 4x Daily PRN Ferdinand Banai, DO      ammonium lactate   Topical BID PRN Susan LILLIANA Major      carvedilol  25 mg Oral BID With Meals Hetpadmini Coy, DO      cinacalcet  30 mg Oral Daily Hetul Andrea, DO      gabapentin  100 mg Oral TID Ferdinand Banai, DO      hydrALAZINE  5 mg Intravenous Q6H PRN Hetul Andrea, DO      insulin glargine  20 Units Subcutaneous HS Hetul Andrea, DO      insulin lispro  1-5 Units Subcutaneous TID AC Hetul Andrea, DO      insulin lispro  3 Units Subcutaneous TID With Meals Hetul Andrea, DO      levothyroxine  50 mcg Oral Daily Hetul Andrea, DO      loratadine  10 mg Oral Daily Hetul Andrea, DO      melatonin  3 mg Oral HS Hetul Andrea, DO      NIFEdipine  30 mg Oral Daily Hetul Andrea, DO      nystatin   Topical BID Hetul Andrea, DO  ondansetron  4 mg Intravenous Q6H PRN Hetul Andrea, DO      oxyCODONE-acetaminophen  1 tablet Oral Q6H PRN Hetul Andrea, DO      pantoprazole  40 mg Oral Early Morning Hetul Andrea, DO      polyethylene glycol  17 g Oral Daily PRN Lavonne Treadwell PA-C      senna  2 tablet Oral HS PRN Hetul Andrea, DO      sertraline  75 mg Oral Daily Ferdinand Banai, DO      sevelamer  1,600 mg Oral TID With Meals Hetul Andrea, DO      vancomycin  10 mg/kg (Adjusted) Intravenous After Dialysis [place]  mg (12/23/21 0995)    warfarin  9 mg Oral Daily (warfarin) Hetul Andrea, DO         PRN Meds:   acetaminophen    albuterol    ALPRAZolam    ammonium lactate    hydrALAZINE    ondansetron    oxyCODONE-acetaminophen    polyethylene glycol    senna    vancomycin    Continuous Infusions:       Invasive Devices: Invasive Devices  Report    Peripheral Intravenous Line            Peripheral IV 12/23/21 Right;Ventral (anterior) Hand <1 day          Line            Hemodialysis AV Fistula 02/20/18 Left Forearm 1401 days                  LABORATORY:    Results from last 7 days   Lab Units 12/23/21  1046 12/23/21  1045 12/22/21  0543 12/21/21  0842 12/20/21  1619   WBC Thousand/uL  --  7 19 5 61 5 69 6 46   HEMOGLOBIN g/dL  --  8 8* 7 1* 7 5* 7 8*   HEMATOCRIT %  --  27 0* 22 0* 23 0* 24 1*   PLATELETS Thousands/uL  --  249 191 203 195   POTASSIUM mmol/L 4 4  --  4 6 4 5 4 4   CHLORIDE mmol/L 101  --  105 103 103   CO2 mmol/L 27  --  28 25 26   BUN mg/dL 21  --  41* 51* 44*   CREATININE mg/dL 3 66*  --  6 59* 8 84* 7 78*   CALCIUM mg/dL 9 0  --  8 1* 8 2* 9 0   MAGNESIUM mg/dL  --   --   --  2 1  --    PHOSPHORUS mg/dL  --   --   --  4 1  --       rest all reviewed    RADIOLOGY:  MRI foot/forefoot toes right wo contrast   Final Result by Clayton Pinedo MD (08/40 7274)      Findings consistent with osteomyelitis of the 1st metatarsal and proximal phalanx      The study was marked in EPIC for immediate notification  Workstation performed: [id number]         XR foot 3+ views RIGHT   Final Result by Nurys Suarez MD (12/21 7134)      Bone destruction involving the medial aspect of the distal 1st metatarsal compatible with acute osteomyelitis  Postoperative changes  Workstation performed: [id number]           Rest all reviewed    Portions of the record may have been created with voice recognition software  Occasional wrong word or "sound a like" substitutions may have occurred due to the inherent limitations of voice recognition software  Read the chart carefully and recognize, using context, where substitutions have occurred  If you have any questions, please contact the dictating provider

## 2021-12-23 NOTE — PLAN OF CARE
Problem: Potential for Falls  Goal: Patient will remain free of falls  Description: INTERVENTIONS:  - Educate patient/family on patient safety including physical limitations  - Instruct patient to call for assistance with activity   - Consult OT/PT to assist with strengthening/mobility   - Keep Call bell within reach  - Keep bed low and locked with side rails adjusted as appropriate  - Keep care items and personal belongings within reach  - Initiate and maintain comfort rounds  - Make Fall Risk Sign visible to staff  - Apply yellow socks and bracelet for high fall risk patients  - Consider moving patient to room near nurses station  Outcome: Progressing     Problem: METABOLIC, FLUID AND ELECTROLYTES - ADULT  Goal: Electrolytes maintained within normal limits  Description: INTERVENTIONS:  - Monitor labs and assess patient for signs and symptoms of electrolyte imbalances  - Administer electrolyte replacement as ordered  - Monitor response to electrolyte replacements, including repeat lab results as appropriate  - Instruct patient on fluid and nutrition as appropriate  Outcome: Progressing  Goal: Fluid balance maintained  Description: INTERVENTIONS:  - Monitor labs   - Monitor I/O and WT  - Instruct patient on fluid and nutrition as appropriate  - Assess for signs & symptoms of volume excess or deficit  Outcome: Progressing     Problem: PAIN - ADULT  Goal: Verbalizes/displays adequate comfort level or baseline comfort level  Description: Interventions:  - Encourage patient to monitor pain and request assistance  - Assess pain using appropriate pain scale  - Administer analgesics based on type and severity of pain and evaluate response  - Implement non-pharmacological measures as appropriate and evaluate response  - Consider cultural and social influences on pain and pain management  - Notify physician/advanced practitioner if interventions unsuccessful or patient reports new pain  Outcome: Progressing     Problem: INFECTION - ADULT  Goal: Absence or prevention of progression during hospitalization  Description: INTERVENTIONS:  - Assess and monitor for signs and symptoms of infection  - Monitor lab/diagnostic results  - Monitor all insertion sites, i e  indwelling lines, tubes, and drains  - Monitor endotracheal if appropriate and nasal secretions for changes in amount and color  - [place] appropriate cooling/warming therapies per order  - Administer medications as ordered  - Instruct and encourage patient and family to use good hand hygiene technique  - Identify and instruct in appropriate isolation precautions for identified infection/condition  Outcome: Progressing  Goal: Absence of fever/infection during neutropenic period  Description: INTERVENTIONS:  - Monitor WBC    Outcome: Progressing     Problem: SAFETY ADULT  Goal: Patient will remain free of falls  Description: INTERVENTIONS:  - Educate patient/family on patient safety including physical limitations  - Instruct patient to call for assistance with activity   - Consult OT/PT to assist with strengthening/mobility   - Keep Call bell within reach  - Keep bed low and locked with side rails adjusted as appropriate  - Keep care items and personal belongings within reach  - Initiate and maintain comfort rounds  - Make Fall Risk Sign visible to staff  - Apply yellow socks and bracelet for high fall risk patients  - Consider moving patient to room near nurses station  Outcome: Progressing  Goal: Maintain or return to baseline ADL function  Description: INTERVENTIONS:  -  Assess patient's ability to carry out ADLs; assess patient's baseline for ADL function and identify physical deficits which impact ability to perform ADLs (bathing, care of mouth/teeth, toileting, grooming, dressing, etc )  - Assess/evaluate cause of self-care deficits   - Assess range of motion  - Assess patient's mobility; develop plan if impaired  - Assess patient's need for assistive devices and provide as appropriate  - Encourage maximum independence but intervene and supervise when necessary  - Involve family in performance of ADLs  - Assess for home care needs following discharge   - Consider OT consult to assist with ADL evaluation and planning for discharge  - Provide patient education as appropriate  Outcome: Progressing  Goal: Maintains/Returns to pre admission functional level  Description: INTERVENTIONS:  - Perform BMAT or MOVE assessment daily    - Set and communicate daily mobility goal to care team and patient/family/caregiver  - Collaborate with rehabilitation services on mobility goals if consulted  - Out of bed for toileting  - Record patient progress and toleration of activity level   Outcome: Progressing     Problem: DISCHARGE PLANNING  Goal: Discharge to home or other facility with appropriate resources  Description: INTERVENTIONS:  - Identify barriers to discharge w/patient and caregiver  - Arrange for needed discharge resources and transportation as appropriate  - Identify discharge learning needs (meds, wound care, etc )  - Arrange for interpretive services to assist at discharge as needed  - Refer to Case Management Department for coordinating discharge planning if the patient needs post-hospital services based on physician/advanced practitioner order or complex needs related to functional status, cognitive ability, or social support system  Outcome: Progressing     Problem: Knowledge Deficit  Goal: Patient/family/caregiver demonstrates understanding of disease process, treatment plan, medications, and discharge instructions  Description: Complete learning assessment and assess knowledge base    Interventions:  - Provide teaching at level of understanding  - Provide teaching via preferred learning methods  Outcome: Progressing

## 2021-12-23 NOTE — OCCUPATIONAL THERAPY NOTE
Occupational Therapy Cancel Note         Patient Name: Cecilia Walsh  LOMFN'C Date: 12/23/2021 12/23/21 1422   OT Last Visit   OT Visit Date 12/23/21   Note Type   Note type Evaluation; Cancelled Session   Cancel Reasons Patient to operating room       OT orders received  Chart reviewed  Pt to OR for RLE TMA  Will continue to follow and see pt as appropriate post-op      Camila MS Carlos, OTR/L

## 2021-12-23 NOTE — PROGRESS NOTES
[address] [facility]  Progress Note Bhavesh Nipple 1965, 49 y o  female MRN: [id number]  Unit/Bed#: [facility] 933-01 Encounter: [id number]  Primary Care Provider: Catherine Muñoz MD   Date and time admitted to hospital: 12/20/2021  3:07 PM      * Right foot ulceration with osteomyelitis  Assessment & Plan  Plan XR foot noting "Bone destruction involving the medial aspect of the distal 1st metatarsal compatible with acute osteomyelitis"  Follow-up MRI noted to be "consistent with osteomyelitis of the 1st metatarsal and proximal phalanx"  Appreciate infectious disease input -> c/w IV Vancomycin regimen after dialysis sessions  Discussed with podiatry today -> postponed ray amputation due to supratherapeutic INR (likely to be performed in 2-3 days)  PRN pain control - supportive care otherwise    ESRD on hemodialysis  Assessment & Plan  Routine hemodialysis per nephrology  Continue Renagel/Sensipar supplementation    Insulin-dependent diabetes mellitus with neuropathy  Assessment & Plan  Lab Results   Component Value Date    HGBA1C 7 9 (H) 09/30/2021     Continue basal/prandial insulin w/ additional SSI coverage per Accu-Cheks  Carbohydrate restricted diet  Continue Gabapentin for neuropathy    Essential hypertension  Assessment & Plan  Continue Coreg/Procardia XL - PRN IV Hydralazine on board for BP spikes  Optimize pain control    History of venous thromboembolism  Assessment & Plan  INR currently mildly supratherapeutic at 3 44 - will hold Coumadin for tentative operative intervention in the next few days  No need for aggressive INR reversal at this time - initiate a vitamin K regimen to augment passive reversal  Trend INR    Hyperlipidemia  Assessment & Plan  Continue Lipitor    Anemia of chronic disease  Assessment & Plan  Monitor H/H    Morbid obesity  Assessment & Plan  BMI of 43 41  Lifestyle/diet modifications      DVT Prophylaxis:  Supratherapeutic INR - holding Coumadin Patient Centered Rounds:  I have performed bedside rounds and discussed plan of care with nursing today  Discussions with Specialists or Other Care Team Provider:  see above assessments if applicable    Education and Discussions with Family / Patient:  Patient, along with significant other, at bedside    Time Spent for Care:  32 minutes  More than 50% of total time spent on counseling and coordination of care as described above  Current Length of Stay: 3 day(s)    Current Patient Status: Inpatient   Certification Statement:  Patient will continue to require additional hospital stay due to assessments as noted above  Code Status: Level 1 - Full Code        Subjective:     Encounter earlier in the morning after returning from dialysis  States pain is waxing/waning, and does complain of some anxiety  Objective:     Vitals:   Temp (24hrs), Av 7 °F (36 5 °C), Min:97 5 °F (36 4 °C), Max:98 1 °F (36 7 °C)    Temp:  [97 5 °F (36 4 °C)-98 1 °F (36 7 °C)] 97 5 °F (36 4 °C)  HR:  [66-80] 80  Resp:  [16-18] 16  BP: ()/(39-86) 122/39  SpO2:  [96 %] 96 %  Body mass index is 43 41 kg/m²  Input and Output Summary (last 24 hours):        Intake/Output Summary (Last 24 hours) at 2021 1528  Last data filed at 2021 1231  Gross per 24 hour   Intake 840 ml   Output 2862 ml   Net -2022 ml       Physical Exam:     GENERAL:  Obese - waxing waning distress from pain  HEAD:  Normocephalic - atraumatic  EYES: PERRL - EOMI   MOUTH:  Mucosa moist  NECK:  Supple - full range of motion  CARDIAC:  Rate controlled  PULMONARY:  Nonlabored respirations at rest  ABDOMEN:  Soft - nontender/nondistended - active bowel sounds  MUSCULOSKELETAL:  Motor strength/range of motion deconditioned  NEUROLOGIC:  Alert/oriented at baseline  SKIN:  Chronic wrinkles/blemishes - right foot dressed/wrapped  PSYCHIATRIC:  Mood/affect anxious      Additional Data:     Labs & Recent Cultures:    Results from last 7 days   Lab Units 12/23/21  1045   WBC Thousand/uL 7 19   HEMOGLOBIN g/dL 8 8*   HEMATOCRIT % 27 0*   PLATELETS Thousands/uL 249   NEUTROS PCT % 74   LYMPHS PCT % 13*   MONOS PCT % 9   EOS PCT % 4     Results from last 7 days   Lab Units 12/23/21  1046 12/22/21  0543 12/21/21  0842   POTASSIUM mmol/L 4 4   < > 4 5   CHLORIDE mmol/L 101   < > 103   CO2 mmol/L 27   < > 25   BUN mg/dL 21   < > 51*   CREATININE mg/dL 3 66*   < > 8 84*   CALCIUM mg/dL 9 0   < > 8 2*   ALK PHOS U/L  --   --  141*   ALT U/L  --   --  14   AST U/L  --   --  12    < > = values in this interval not displayed  Results from last 7 days   Lab Units 12/23/21  1045   INR  3 44*     Results from last 7 days   Lab Units 12/23/21  1119 12/23/21  1012 12/22/21  2043 12/22/21  1656 12/22/21  1120 12/22/21  0900 12/22/21  0731 12/22/21  0701 12/22/21  0641 12/21/21  2214 12/21/21  1602 12/21/21  1245   POC GLUCOSE mg/dl 147* 124 75 125 117 284* 220* 43* 44* 139 176* 157*                 Results from last 7 days   Lab Units 12/20/21 1954 12/20/21 1953 12/20/21 1952   BLOOD CULTURE   --  No Growth at 48 hrs  No Growth at 48 hrs     GRAM STAIN RESULT  Rare Polys  No bacteria seen  --   --    WOUND CULTURE  2+ Growth of Methicillin Resistant Staphylococcus aureus*  --   --          Last 24 Hours Medication List:   Current Facility-Administered Medications   Medication Dose Route Frequency Provider Last Rate    [MAR Hold] acetaminophen  650 mg Oral Q6H PRN Farhad Andrea, DO      [MAR Hold] albuterol  2 puff Inhalation Q6H PRN Hetul Andrea, DO      [MAR Hold] ALPRAZolam  0 25 mg Oral 4x Daily PRN Ferdinand Cardona DO      [MAR Hold] ammonium lactate   Topical BID PRN Omar Traylor PA-C      [facility] Hold] carvedilol  25 mg Oral BID With Meals Hetul Lary Zamora, DO      [MAR Hold] cinacalcet  30 mg Oral Daily Farhad Andrea DO      [facility] Hold] gabapentin  100 mg Oral TID Ferdinand Cardona DO      [MAR Hold] hydrALAZINE  5 mg Intravenous Q6H PRN Farhad Andrea DO      [facility] Hold] insulin glargine  20 Units Subcutaneous HS Hetul Andrea, DO      [MAR Hold] insulin lispro  1-5 Units Subcutaneous TID AC Hetul Andrea, DO      [MAR Hold] insulin lispro  3 Units Subcutaneous TID With Meals Hetul Andrea, DO      [MAR Hold] levothyroxine  50 mcg Oral Daily Hetul Andrea, DO      [MAR Hold] loratadine  10 mg Oral Daily Hetul Andrea, DO      [MAR Hold] melatonin  3 mg Oral HS Hetul Andrea, DO      [MAR Hold] NIFEdipine  30 mg Oral Daily Hetul Andrea, DO      [MAR Hold] nystatin   Topical BID Hetul Andrea, DO      [MAR Hold] ondansetron  4 mg Intravenous Q6H PRN Hetul Andrea, DO      [MAR Hold] oxyCODONE-acetaminophen  1 tablet Oral Q6H PRN Hetul Andrea, DO      [MAR Hold] pantoprazole  40 mg Oral Early Morning Hetul Andrea, DO      phytonadione  5 mg Intravenous Daily Mary Whittington MD      [facility] Hold] polyethylene glycol  17 g Oral Daily PRN Rashad Steele PA-C      [facility] Hold] senna  2 tablet Oral HS PRN Hetul Andrea, DO      [MAR Hold] sertraline  75 mg Oral Daily Ferdinand Banai, DO      [MAR Hold] sevelamer  1,600 mg Oral TID With Meals Hetul Andrea, DO      [MAR Hold] torsemide  100 mg Oral Daily Wilfredo Keller MD      [facility] Hold] vancomycin  10 mg/kg (Adjusted) Intravenous After Dialysis Yvette Hernandez  mg (12/23/21 9638)            ** Please Note: This note is constructed using a voice recognition dictation system  An occasional wrong word/phrase or sound-a-like substitution may have been picked up by dictation device due to the inherent limitations of voice recognition software  Read the chart carefully and recognize, using reasonable context, where substitutions may have occurred  **

## 2021-12-23 NOTE — ASSESSMENT & PLAN NOTE
INR currently mildly supratherapeutic at 3 44 - will hold Coumadin for tentative operative intervention in the next few days  No need for aggressive INR reversal at this time - initiate a vitamin K regimen to augment passive reversal  Trend INR

## 2021-12-23 NOTE — ED PROVIDER NOTES
History  Chief Complaint   Patient presents with    Foot Pain     Patient reports her foot is infected and has a hole in it  Seen last night and discharged with antibiotics- states she was not told there would be antibiotics  Patient is a 50 YO F, PMHx including ESRD on HD and DM, who presents to the ED for a non-healing right foot ulcer  Patient states the ulcer has been present for several months  She has been followed by both podiatry and wound care for this ulcer  She notes recently there has bene some increasing redness, pain, and swelling surrounding the ulcer  Patient states that due to the worsening symptoms, she was evaluated in the ED yesterday  Ultimately, she wad d/c with PO abx and an outpatient podiatry appointment was scheduled for her the following morning (this morning)  Patient was unable to make the appointment because she had a wound care nurse appointment, and was unable to fill her abx  She returns for worsening pain  Patient currently denies any fevers, chest pain, SOB, N/V/D, or any other new or concerning symptoms  History provided by:  Patient      Prior to Admission Medications   Prescriptions Last Dose Informant Patient Reported? Taking?    ALPRAZolam (XANAX) 0 25 mg tablet  Self Yes Yes   Sig: Take 0 25 mg by mouth 2 (two) times a day as needed for anxiety   Accu-Chek Guide test strip   Yes No   Sig: use to TEST BLOOD SUGAR two to three times a day   Accu-Chek Softclix Lancets lancets  Self Yes No   Sig: 3 (three) times a day Use to test blood sugar   B Complex-C-Folic Acid (Natalia-Denys) TABS   Yes Yes   Sig: Take 1 tablet by mouth daily   KIONEX 15 GM/60ML suspension  Self Yes Yes   Sig: Take by mouth Takes as a shake on dialysis days Tues-Thurs_Sat   NIFEdipine (ADALAT CC) 30 MG 24 hr tablet   No Yes   Sig: Take 1 tablet (30 mg total) by mouth daily   NOVOLOG FLEXPEN 100 units/mL SOPN  Self Yes Yes   Sig: INJECT 1 TO 5 UNITS SUBCUTANEOUSLY THREE TIMES A DAY BEFORE MELAS AS PER SLIDING SCALE   acetaminophen (TYLENOL) 325 mg tablet  Self No No   Sig: Take 2 tablets (650 mg total) by mouth every 6 (six) hours as needed for mild pain or fever   Patient not taking: Reported on 10/27/2021   albuterol (ProAir HFA) 90 mcg/act inhaler   No Yes   Sig: Inhale 2 puffs every 6 (six) hours as needed for wheezing or shortness of breath   ammonium lactate (LAC-HYDRIN) 12 % cream   No Yes   Sig: Apply topically 2 (two) times a day   atorvastatin (LIPITOR) 20 mg tablet  Self Yes Yes   Sig: Take 20 mg by mouth every evening    carvedilol (COREG) 25 mg tablet  Self Yes Yes   Sig: Take 25 mg by mouth 2 (two) times a day with meals     cephalexin (KEFLEX) 500 mg capsule Not Taking at Unknown time  No No   Sig: Take 1 capsule (500 mg total) by mouth every 12 (twelve) hours for 7 days   Patient not taking: Reported on 12/20/2021    cinacalcet (SENSIPAR) 30 mg tablet  Self Yes Yes   Sig: Take 30 mg by mouth daily   gabapentin (NEURONTIN) 100 mg capsule   Yes Yes   Sig: Take 100 mg by mouth 3 (three) times a day   insulin glargine (Lantus SoloStar) 100 units/mL injection pen   No Yes   Sig: Inject 20 Units under the skin daily at bedtime   levothyroxine 50 mcg tablet  Self Yes Yes   Sig: Take 50 mcg by mouth daily   loratadine (CLARITIN) 10 mg tablet  Self Yes Yes   Sig: Take 10 mg by mouth daily   melatonin 3 mg   No Yes   Sig: Take 1 tablet (3 mg total) by mouth daily at bedtime   metoclopramide (REGLAN) 10 mg/10 mL oral solution   No Yes   Sig: Take 5 mL (5 mg total) by mouth every 6 (six) hours as needed (nausea)   nystatin (MYCOSTATIN) powder  Self No Yes   Sig: Apply topically 2 (two) times a day   oxyCODONE-acetaminophen (PERCOCET) 5-325 mg per tablet   No Yes   Sig: Take 1 tablet by mouth every 6 (six) hours as needed for moderate pain for up to 10 dosesMax Daily Amount: 4 tablets   pantoprazole (PROTONIX) 40 mg tablet   Yes Yes   Sig: take 1 tablet by mouth twice a day   polyethylene glycol (MIRALAX) 17 g packet   No No   Sig: Take 17 g by mouth daily for 7 days   polyethylene glycol (MIRALAX) 17 g packet   No No   Sig: Take 17 g by mouth daily   senna (SENOKOT) 8 6 mg  Self No Yes   Sig: Take 2 tablets (17 2 mg total) by mouth daily at bedtime as needed for constipation   sertraline (ZOLOFT) 50 mg tablet  Self No Yes   Sig: Take 1 tablet (50 mg total) by mouth daily   Patient taking differently: Take 75 mg by mouth daily     sevelamer (RENAGEL) 800 mg tablet   No Yes   Sig: Take 2 tablets (1,600 mg total) by mouth 3 (three) times a day with meals   torsemide (DEMADEX) 100 mg tablet   No Yes   Sig: Take 1 tablet (100 mg total) by mouth 4 (four) times a week On , Monday, Wednesday, Friday   Patient taking differently: Take 100 mg by mouth 4 (four) times a week On , Monday, Wednesday, Friday (pt states she takes it everyday)    warfarin (COUMADIN) 3 mg tablet  Self No Yes   Sig: Take 3 tablets (9 mg total) by mouth daily Takes 9 mg Monday Sat      Facility-Administered Medications: None       Past Medical History:   Diagnosis Date    Abnormal uterine bleeding (AUB)     Anemia     Anxiety     Arthritis     Chronic kidney disease     Claustrophobia     Diabetes mellitus ([facility] )     Disease of thyroid gland     DVT (deep venous thrombosis) (HCC)     [facility] stage [facility] disease ([facility] )     Foot ulcer due to secondary DM ([facility] )     Hemodialysis patient ([facility] )     Tues, Thurs, Sat    Hypertension     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[facility] )     right eye    Morbid obesity ([facility] )     Osteomyelitis of fifth toe of right foot (HCC)     Panic attacks     Pulmonary embolism (HCC)     Reflux esophagitis     Thrombophlebitis of saphenous vein     Warfarin anticoagulation        Past Surgical History:   Procedure Laterality Date    AMPUTATION      r 4th toe    ARTERIOVENOUS GRAFT PLACEMENT      CATARACT EXTRACTION Bilateral     CERVICAL BIOPSY  W/ LOOP ELECTRODE EXCISION       SECTION      DIALYSIS FISTULA CREATION      DILATION AND CURETTAGE OF UTERUS      ENDOMETRIAL ABLATION W/ NOVASURE      EYE SURGERY      HYSTERECTOMY      @ age 57 (complete)    IR AV FISTULAGRAM/GRAFTOGRAM  10/11/2019    IR AV FISTULAGRAM/GRAFTOGRAM  8/12/2020    IR AV FISTULAGRAM/GRAFTOGRAM  12/23/2020    IR NON-TUNNELED CENTRAL LINE PLACEMENT  6/29/2021    IR NON-TUNNELED CENTRAL LINE PLACEMENT  10/4/2021    OOPHORECTOMY Bilateral     @ age 57    PERICARDIUM SURGERY      NV AMPUTATION TOE,MT-P JT Right 5/28/2020    Procedure: AMPUTATION TOE- 5th;  Surgeon: Yosi Montes DPM;  Location: [facility];  Service: Podiatry    NV COLONOSCOPY FLX DX W/COLLJ [address] PFRMD N/A 3/13/2019    Procedure: COLONOSCOPY;  Surgeon: Roxanne Salazar MD;  Location: [facility]; Service: Gastroenterology    NV ESOPHAGOGASTRODUODENOSCOPY TRANSORAL DIAGNOSTIC N/A 3/13/2019    Procedure: ESOPHAGOGASTRODUODENOSCOPY (EGD); Surgeon: Roxanne Salazar MD;  [facility]: [facility]; Service: Gastroenterology    NV LAPAROSCOPY W TOT HYSTERECT UTERUS 250 GRAM OR LESS N/A 2/16/2016    Procedure: HYSTERECTOMY LAPAROSCOPIC TOTAL [facility]), with bilateral salpingectomy;  Surgeon: Solo Palomino DO;  [facility];  Service: Gynecology    THROMBECTOMY / ARTERIOVENOUS GRAFT REVISION      TOE SURGERY Right     removal of the 4th toe    WOUND DEBRIDEMENT Right 10/8/2021    Procedure: R 1st MTPJ washout ;  Surgeon: Malik Montesinos DPM;  [facility];  Service: Podiatry       Family History   Problem Relation Age of Onset    Heart disease Family     Diabetes Family     Heart disease Mother     Cancer Brother         neck    Throat cancer Brother     Ovarian cancer Maternal Aunt 36     I have reviewed and agree with the history as documented      E-Cigarette/Vaping    E-Cigarette Use Never User      E-Cigarette/Vaping Substances    Nicotine No     THC No     CBD No     Flavoring No     Other No     Unknown No      Social History     Tobacco Use    Smoking status: Never Smoker    Smokeless tobacco: Never Used   Vaping Use    Vaping Use: Never used   Substance Use Topics    Alcohol use: Never     Alcohol/week: 0 0 standard drinks    Drug use: No        Review of Systems   Constitutional: Positive for chills  Negative for fever  Respiratory: Negative for shortness of breath  Cardiovascular: Negative for chest pain  Gastrointestinal: Negative for abdominal pain, diarrhea, nausea and vomiting  Musculoskeletal:        Right great toe pain   Skin: Positive for wound  Negative for rash  All other systems reviewed and are negative  Physical Exam  ED Triage Vitals [12/20/21 1437]   Temperature Pulse Respirations Blood Pressure SpO2   98 3 °F (36 8 °C) 74 18 (!) 191/71 95 %      Temp Source Heart Rate Source Patient Position - Orthostatic VS BP Location FiO2 (%)   Oral Monitor Sitting Right arm --      Pain Score       8             Orthostatic Vital Signs  Vitals:    12/22/21 0500 12/22/21 0859 12/22/21 1547 12/22/21 2255   BP: 165/69 (!) 187/82 (!) 179/83 (!) 130/49   Pulse: 68 73 70    Patient Position - Orthostatic VS:           Physical Exam  Vitals and nursing note reviewed  Constitutional:       General: She is not in acute distress  Appearance: She is well-developed  She is not diaphoretic  HENT:      Head: Normocephalic and atraumatic  Right Ear: External ear normal       Left Ear: External ear normal       Nose: Nose normal    Eyes:      General: Lids are normal  No scleral icterus  Cardiovascular:      Rate and Rhythm: Normal rate and regular rhythm  Heart sounds: Normal heart sounds  No murmur heard  No friction rub  No gallop  Pulmonary:      Effort: Pulmonary effort is normal  No respiratory distress  Breath sounds: Normal breath sounds  No wheezing or rales  Musculoskeletal:         General: No deformity  Normal range of motion  Cervical back: Normal range of motion and neck supple     Skin: General: Skin is warm and dry  Comments: Ulcer noted to the lateral right 1st MTP  There is a moderate amount of associated swelling and a small amount of surrounding erythema  The MTP is mildly tender to palpation  Granulation tissue present  No obvious drainage  No crepitus   Neurological:      General: No focal deficit present  Mental Status: She is alert     Psychiatric:         Mood and Affect: Mood normal          Behavior: Behavior normal                  ED Medications  Medications   acetaminophen (TYLENOL) tablet 650 mg (has no administration in time range)   albuterol (PROVENTIL HFA,VENTOLIN HFA) inhaler 2 puff (has no administration in time range)   carvedilol (COREG) tablet 25 mg (25 mg Oral Not Given 12/23/21 0731)   cinacalcet (SENSIPAR) tablet 30 mg (30 mg Oral Given 12/22/21 0903)   insulin glargine (LANTUS) subcutaneous injection 20 Units 0 2 mL (20 Units Subcutaneous Refused 12/22/21 2202)   levothyroxine tablet 50 mcg (50 mcg Oral Given 12/22/21 0901)   loratadine (CLARITIN) tablet 10 mg (10 mg Oral Given 12/22/21 0901)   melatonin tablet 3 mg (3 mg Oral Given 12/22/21 2203)   NIFEdipine (PROCARDIA XL) 24 hr tablet 30 mg (30 mg Oral Given 12/22/21 0904)   insulin lispro (HumaLOG) 100 units/mL subcutaneous injection 3 Units (3 Units Subcutaneous Not Given 12/23/21 0732)   nystatin (MYCOSTATIN) powder ( Topical Not Given 12/22/21 1713)   oxyCODONE-acetaminophen (PERCOCET) 5-325 mg per tablet 1 tablet (1 tablet Oral Given 12/22/21 2203)   pantoprazole (PROTONIX) EC tablet 40 mg (40 mg Oral Not Given 12/23/21 0520)   polyethylene glycol (MIRALAX) packet 17 g (17 g Oral Given 12/22/21 0856)   senna (SENOKOT) tablet 17 2 mg (has no administration in time range)   sevelamer (RENAGEL) tablet 1,600 mg (1,600 mg Oral Not Given 12/23/21 0733)   warfarin (COUMADIN) tablet 9 mg (9 mg Oral Given 12/22/21 1720)   ondansetron (ZOFRAN) injection 4 mg (4 mg Intravenous Given 12/23/21 0715)   hydrALAZINE (APRESOLINE) injection 5 mg (5 mg Intravenous Given 12/21/21 0047)   insulin lispro (HumaLOG) 100 units/mL subcutaneous injection 1-5 Units (1 Units Subcutaneous Not Given 12/22/21 1713)   sertraline (ZOLOFT) tablet 75 mg (75 mg Oral Given 12/22/21 0901)   ALPRAZolam (XANAX) tablet 0 25 mg (0 25 mg Oral Given 12/22/21 2203)   gabapentin (NEURONTIN) capsule 100 mg (100 mg Oral Given 12/22/21 2203)   ammonium lactate (LAC-HYDRIN) 12 % cream ( Topical Given 12/21/21 2207)   vancomycin (VANCOCIN) IVPB (premix in dextrose) 750 mg 150 mL (has no administration in time range)   cephalexin (KEFLEX) capsule 500 mg (500 mg Oral Given 12/20/21 1617)   diphenhydrAMINE (BENADRYL) tablet 25 mg (25 mg Oral Given 12/20/21 1811)   vancomycin (VANCOCIN) 1,750 mg in sodium chloride 0 9 % 500 mL IVPB (0 mg Intravenous Stopped 12/21/21 2017)   glucose chewable tablet 16 g (16 g Oral Given 12/22/21 0644)   dextrose 50 % IV solution 25 mL (25 mL Intravenous Given 12/22/21 0707)   dextrose 50 % IV solution 25 mL (25 mL Intravenous Given 12/22/21 0713)       Diagnostic Studies  Results Reviewed     Procedure Component Value Units Date/Time    Blood culture [[id number]] Collected: 12/20/21 1952    Lab Status: Preliminary result Specimen: Blood from Arm, Right Updated: 12/22/21 2301     Blood Culture No Growth at 48 hrs  Blood culture [[id number]] Collected: 12/20/21 1953    Lab Status: Preliminary result Specimen: Blood from Arm, Right Updated: 12/22/21 2301     Blood Culture No Growth at 48 hrs      Wound culture and Gram stain [[id number]]  (Abnormal)  (Susceptibility) Collected: 12/20/21 1954    Lab Status: Final result Specimen: Wound from Foot, Right Updated: 12/22/21 0806     Wound Culture 2+ Growth of Methicillin Resistant Staphylococcus aureus     Gram Stain Result Rare Polys      No bacteria seen    Susceptibility     Methicillin Resistant Staphylococcus aureus (1)     Antibiotic Interpretation Microscan   Method Status    Ampicillin ($$) Resistant >8 00 ug/ml LANCE Final    Cefazolin ($) Resistant <=4 00 ug/ml LANCE Final    Clindamycin ($) Susceptible <=0 25 ug/ml LANCE Final    Erythromycin ($$$$) Susceptible <=0 25 ug/ml LANCE Final    Gentamicin ($$) Susceptible <=1 ug/ml LANCE Final    Oxacillin Resistant >2 00 ug/ml LANCE Final    Tetracycline Susceptible <=2 ug/ml LANCE Final    Trimethoprim + Sulfamethoxazole ($$$) Susceptible <=0 5/9 5 ug/ml LANCE Final    Vancomycin ($) Susceptible 1 00 ug/ml LANCE Final                   Anaerobic culture and Gram stain [[id number]] Collected: 12/20/21 1954    Lab Status: Final result Specimen: Wound Updated: 12/22/21 0743     Anaerobic Culture No anaerobes isolated    Protime-INR [[id number]]  (Abnormal) Collected: 12/21/21 0842    Lab Status: Final result Specimen: Blood from Arm, Left Updated: 12/21/21 0950     Protime 28 9 seconds      INR 2 90    Comprehensive metabolic panel [[id number]]  (Abnormal) Collected: 12/21/21 0842    Lab Status: Final result Specimen: Blood from Arm, Left Updated: 12/21/21 0929     Sodium 136 mmol/L      Potassium 4 5 mmol/L      Chloride 103 mmol/L      CO2 25 mmol/L      ANION GAP 8 mmol/L      BUN 51 mg/dL      Creatinine 8 84 mg/dL      Glucose 124 mg/dL      Calcium 8 2 mg/dL      Corrected Calcium 9 2 mg/dL      AST 12 U/L      ALT 14 U/L      Alkaline Phosphatase 141 U/L      Total Protein 6 4 g/dL      Albumin 2 7 g/dL      Total Bilirubin 0 46 mg/dL      eGFR 4 ml/min/1 73sq m     Narrative:      [place] guidelines for Chronic Kidney Disease (CKD):     Stage 1 with normal or high GFR (GFR > 90 mL/min/1 73 square meters)    Stage 2 Mild CKD (GFR = 60-89 mL/min/1 73 square meters)    Stage 3A Moderate CKD (GFR = 45-59 mL/min/1 73 square meters)    Stage 3B Moderate CKD (GFR = 30-44 mL/min/1 73 square meters)    Stage 4 Severe CKD (GFR = 15-29 mL/min/1 73 square meters)    Stage 5 End Stage CKD (GFR <15 mL/min/1 73 square meters)  Note: GFR calculation is accurate only with a steady state creatinine    Magnesium [[id number]]  (Normal) Collected: 12/21/21 0842    Lab Status: Final result Specimen: Blood from Arm, Left Updated: 12/21/21 0929     Magnesium 2 1 mg/dL     Phosphorus [[id number]]  (Normal) Collected: 12/21/21 0842    Lab Status: Final result Specimen: Blood from Arm, Left Updated: 12/21/21 0929     Phosphorus 4 1 mg/dL     CBC and differential [[id number]]  (Abnormal) Collected: 12/21/21 0842    Lab Status: Final result Specimen: Blood from Arm, Left Updated: 12/21/21 0915     WBC 5 69 Thousand/uL      RBC 2 49 Million/uL      Hemoglobin 7 5 g/dL      Hematocrit 23 0 %      MCV 92 fL      MCH 30 1 pg      MCHC 32 6 g/dL      RDW 14 6 %      MPV 10 5 fL      Platelets 585 Thousands/uL      nRBC 0 /100 WBCs      Neutrophils Relative 71 %      Immat GRANS % 1 %      Lymphocytes Relative 13 %      Monocytes Relative 10 %      Eosinophils Relative 4 %      Basophils Relative 1 %      Neutrophils Absolute 4 10 Thousands/µL      Immature Grans Absolute 0 03 Thousand/uL      Lymphocytes Absolute 0 73 Thousands/µL      Monocytes Absolute 0 58 Thousand/µL      Eosinophils Absolute 0 22 Thousand/µL      Basophils Absolute 0 03 Thousands/µL     Protime-INR [[id number]]  (Abnormal) Collected: 12/20/21 1952    Lab Status: Final result Specimen: Blood from Arm, Right Updated: 12/20/21 2044     Protime 27 0 seconds      INR 2 65    Blood culture [[id number]]     Lab Status: No result Specimen: Blood from Arm, Left     Blood culture [[id number]]     Lab Status: No result Specimen: Blood from Arm, Right     Basic metabolic panel [[id number]]  (Abnormal) Collected: 12/20/21 1619    Lab Status: Final result Specimen: Blood from Arm, Right Updated: 12/20/21 1700     Sodium 136 mmol/L      Potassium 4 4 mmol/L      Chloride 103 mmol/L      CO2 26 mmol/L      ANION GAP 7 mmol/L      BUN 44 mg/dL      Creatinine 7 78 mg/dL      Glucose 176 mg/dL      Calcium 9 0 mg/dL eGFR 5 ml/min/1 73sq m     Narrative:      [employer] guidelines for Chronic Kidney Disease (CKD):     Stage 1 with normal or high GFR (GFR > 90 mL/min/1 73 square meters)    Stage 2 Mild CKD (GFR = 60-89 mL/min/1 73 square meters)    Stage 3A Moderate CKD (GFR = 45-59 mL/min/1 73 square meters)    Stage 3B Moderate CKD (GFR = 30-44 mL/min/1 73 square meters)    Stage 4 Severe CKD (GFR = 15-29 mL/min/1 73 square meters)    Stage 5 End Stage CKD (GFR <15 mL/min/1 73 square meters)  Note: GFR calculation is accurate only with a steady state creatinine    C-reactive protein [[id number]]  (Abnormal) Collected: 12/20/21 1619    Lab Status: Final result Specimen: Blood from Arm, Right Updated: 12/20/21 1700      0 mg/L     CBC and differential [[id number]]  (Abnormal) Collected: 12/20/21 1619    Lab Status: Final result Specimen: Blood from Arm, Right Updated: 12/20/21 1654     WBC 6 46 Thousand/uL      RBC 2 57 Million/uL      Hemoglobin 7 8 g/dL      Hematocrit 24 1 %      MCV 94 fL      MCH 30 4 pg      MCHC 32 4 g/dL      RDW 14 8 %      MPV 10 9 fL      Platelets 264 Thousands/uL      nRBC 0 /100 WBCs      Neutrophils Relative 68 %      Immat GRANS % 0 %      Lymphocytes Relative 18 %      Monocytes Relative 11 %      Eosinophils Relative 2 %      Basophils Relative 1 %      Neutrophils Absolute 4 39 Thousands/µL      Immature Grans Absolute 0 02 Thousand/uL      Lymphocytes Absolute 1 17 Thousands/µL      Monocytes Absolute 0 70 Thousand/µL      Eosinophils Absolute 0 15 Thousand/µL      Basophils Absolute 0 03 Thousands/µL     Sedimentation rate, automated [[id number]]  (Abnormal) Collected: 12/20/21 1619    Lab Status: Final result Specimen: Blood from Arm, Right Updated: 12/20/21 1649     Sed Rate 42 mm/hour                  MRI foot/forefoot toes right wo contrast   Final Result by Ave Vega MD (15/33 7110)      Findings consistent with osteomyelitis of the 1st metatarsal and proximal phalanx      The study was marked in EPIC for immediate notification  Workstation performed: [id number]         XR foot 3+ views RIGHT   Final Result by Allen Man MD (12/21 3388)      Bone destruction involving the medial aspect of the distal 1st metatarsal compatible with acute osteomyelitis  Postoperative changes  Workstation performed: [id number]               Procedures  Procedures      ED Course  ED Course as of 12/23/21 0742   Mon Dec 20, 2021   1536 [place] text sent to podiatry   [phone] Per podiatry resident, it will be about 2-3 hours before they can evaluate her  Will discuss with patient   [phone] Patient is ok with waiting for podiatry evaluation  Podiatry resident informed  1654 Sed Rate(!): 42   1654 Hemoglobin(!): 7 8   1654 WBC: 6 46   1654 Platelet Count: 040   1706 C-REACTIVE PROTEIN(!): 103 0   1757 Patient states that she is itchy after the keflex  Will give benadryl   1900 Discussed case with podiatry  Recommends admission to medicine  Will admit to [facility]  MDM  Number of Diagnoses or Management Options  Foot ulcer ([facility] )  Diagnosis management comments: Patient is a 49 y o  female who presents to the ED for right great toe pain and a non-healing ulcer  Pt non-toxic appearing on exam      Differential includes but is not limited to: diabetic foot ulcer, osteomyelitis, cellulitis  Doubt abscess  Plan: Labs, plain films, podiatry consult  Dispo pending podiatry evaluation and w/u  Will give PO dose of abx while evaluation pending  Portions of the record may have been created with voice recognition software  Occasional wrong word or "sound a like" substitutions may have occurred due to the inherent limitations of voice recognition software  Read the chart carefully and recognize, using context, where substitutions have occurred           Amount and/or Complexity of Data Reviewed  Clinical lab tests: ordered and reviewed  Tests in the radiology section of CPT®: reviewed and ordered  Tests in the medicine section of CPT®: ordered and reviewed  Independent visualization of images, tracings, or specimens: yes    Risk of Complications, Morbidity, and/or Mortality  Presenting problems: high  Diagnostic procedures: moderate  Management options: moderate    Patient Progress  Patient progress: stable      Disposition  Final diagnoses: Foot ulcer ([facility] )     Time reflects when diagnosis was documented in both MDM as applicable and the Disposition within this note     Time User Action Codes Description Comment    12/20/2021  3:32 PM Byron Lock Add [P26 [id number]] Foot ulcer ([facility] )     12/22/2021  1:43 PM Hernán Driscoll Add [[id number]] Ulcer of right foot with bone involvement without evidence of necrosis [facility])       ED Disposition     ED Disposition Condition Date/Time Comment    Admit Stable Mon Dec 20, 2021  7:03 PM Case was discussed with Dr Renay Fernandez and the patient's admission status was agreed to be Admission Status: observation status to the service of Dr Renay Fernandez   Follow-up Information    None         Current Discharge Medication List      CONTINUE these medications which have NOT CHANGED    Details   albuterol (ProAir HFA) 90 mcg/act inhaler Inhale 2 puffs every 6 (six) hours as needed for wheezing or shortness of breath  Qty: 8 5 g, Refills: 0    Comments: Substitution to a formulary equivalent within the same pharmaceutical class is authorized    Associated Diagnoses: Flu-like symptoms      ALPRAZolam (XANAX) 0 25 mg tablet Take 0 25 mg by mouth 2 (two) times a day as needed for anxiety      ammonium lactate (LAC-HYDRIN) 12 % cream Apply topically 2 (two) times a day  Qty: 385 g, Refills: 0    Associated Diagnoses: Pruritus      atorvastatin (LIPITOR) 20 mg tablet Take 20 mg by mouth every evening   Refills: 0      B Complex-C-Folic Acid (Natalia-Denys) TABS Take 1 tablet by mouth daily      carvedilol (COREG) 25 mg tablet Take 25 mg by mouth 2 (two) times a day with meals        cinacalcet (SENSIPAR) 30 mg tablet Take 30 mg by mouth daily      gabapentin (NEURONTIN) 100 mg capsule Take 100 mg by mouth 3 (three) times a day      insulin glargine (Lantus SoloStar) 100 units/mL injection pen Inject 20 Units under the skin daily at bedtime  Qty: 15 mL, Refills: 0    Associated Diagnoses: Type 2 diabetes mellitus with diabetic polyneuropathy, with long-term current use of insulin (HCC)      KIONEX 15 GM/60ML suspension Take by mouth Takes as a shake on dialysis days Tues-Thurs_Sat  Refills: 0      levothyroxine 50 mcg tablet Take 50 mcg by mouth daily      loratadine (CLARITIN) 10 mg tablet Take 10 mg by mouth daily      melatonin 3 mg Take 1 tablet (3 mg total) by mouth daily at bedtime  Qty: 30 tablet, Refills: 0    Associated Diagnoses: Insomnia      metoclopramide (REGLAN) 10 mg/10 mL oral solution Take 5 mL (5 mg total) by mouth every 6 (six) hours as needed (nausea)  Qty: 120 mL, Refills: 1    Associated Diagnoses: Intractable vomiting with nausea      NIFEdipine (ADALAT CC) 30 MG 24 hr tablet Take 1 tablet (30 mg total) by mouth daily  Qty: 30 tablet, Refills: 0    Associated Diagnoses: Essential hypertension      NOVOLOG FLEXPEN 100 units/mL SOPN INJECT 1 TO 5 UNITS SUBCUTANEOUSLY THREE TIMES A DAY BEFORE MELAS AS PER SLIDING SCALE      nystatin (MYCOSTATIN) powder Apply topically 2 (two) times a day  Qty: 15 g, Refills: 0    Associated Diagnoses: Candidiasis of breast      oxyCODONE-acetaminophen (PERCOCET) 5-325 mg per tablet Take 1 tablet by mouth every 6 (six) hours as needed for moderate pain for up to 10 dosesMax Daily Amount: 4 tablets  Qty: 10 tablet, Refills: 0    Associated Diagnoses: Bilious vomiting with nausea      pantoprazole (PROTONIX) 40 mg tablet take 1 tablet by mouth twice a day      senna (SENOKOT) 8 6 mg Take 2 tablets (17 2 mg total) by mouth daily at bedtime as needed for constipation  Qty: 30 each, Refills: 0    Associated Diagnoses: Ambulatory dysfunction      sertraline (ZOLOFT) 50 mg tablet Take 1 tablet (50 mg total) by mouth daily  Qty: 30 tablet, Refills: 0    Associated Diagnoses: Anxiety disorder      sevelamer (RENAGEL) 800 mg tablet Take 2 tablets (1,600 mg total) by mouth 3 (three) times a day with meals  Qty: 30 tablet, Refills: 0    Associated Diagnoses: ESRD on hemodialysis (HCC)      torsemide (DEMADEX) 100 mg tablet Take 1 tablet (100 mg total) by mouth 4 (four) times a week On Sunday, Monday, Wednesday, Friday  Refills: 0    Associated Diagnoses: Parenchymal renal hypertension      warfarin (COUMADIN) 3 mg tablet Take 3 tablets (9 mg total) by mouth daily Takes 9 mg Monday Sat  Qty: 10 tablet, Refills: 0    Associated Diagnoses: History of DVT (deep vein thrombosis)      Accu-Chek Guide test strip use to TEST BLOOD SUGAR two to three times a day      Accu-Chek Softclix Lancets lancets 3 (three) times a day Use to test blood sugar      acetaminophen (TYLENOL) 325 mg tablet Take 2 tablets (650 mg total) by mouth every 6 (six) hours as needed for mild pain or fever  Qty: 30 tablet, Refills: 0    Associated Diagnoses: Atypical chest pain      cephalexin (KEFLEX) 500 mg capsule Take 1 capsule (500 mg total) by mouth every 12 (twelve) hours for 7 days  Qty: 14 capsule, Refills: 0    Associated Diagnoses: Foot ulcer (HCC)      polyethylene glycol (MIRALAX) 17 g packet Take 17 g by mouth daily  Qty: 510 g, Refills: 5    Associated Diagnoses: Chronic constipation           No discharge procedures on file  PDMP Review       Value Time User    PDMP Reviewed  Yes 11/21/2021  1:00 PM Sunday MD Naren           ED Provider  Attending physically available and evaluated Josef William  MANISH managed the patient along with the ED Attending      Electronically Signed by         Aleksandar Roman DO  12/23/21 9417

## 2021-12-23 NOTE — PLAN OF CARE
Patient is for a 3 hour HD session with an UF net goal of 3L as discussed with Dr Russell Betancur  Post-Dialysis RN Treatment Note    Blood Pressure:  Pre 154/86 mm/Hg  Post 122/39 mmHg   EDW  121 kg    Weight:  Pre 125 2 kg   Post 122 2 kg   Mode of weight measurement: Standing Scale   Volume Removed  2862 ml    Treatment duration 173 minutes    NS given  No    Treatment shortened? Yes, describe: Pt  requested to terminate tx 7 minutes early to use the restroom d/t diarrhea     Medications given during Rx 750 mg Vanco   Estimated Kt/V  1 10   Access type: AV fistula   Access Issues: No    Report called to primary nurse   Yes Bere Schroeder RN    Problem: METABOLIC, FLUID AND ELECTROLYTES - ADULT  Goal: Electrolytes maintained within normal limits  Description: INTERVENTIONS:  - Monitor labs and assess patient for signs and symptoms of electrolyte imbalances  - Administer electrolyte replacement as ordered  - Monitor response to electrolyte replacements, including repeat lab results as appropriate  - Instruct patient on fluid and nutrition as appropriate  Outcome: Progressing  Goal: Fluid balance maintained  Description: INTERVENTIONS:  - Monitor labs   - Monitor I/O and WT  - Instruct patient on fluid and nutrition as appropriate  - Assess for signs & symptoms of volume excess or deficit  Outcome: Progressing

## 2021-12-23 NOTE — ASSESSMENT & PLAN NOTE
Lab Results   Component Value Date    HGBA1C 7 9 (H) 09/30/2021     Continue basal/prandial insulin w/ additional SSI coverage per Accu-Cheks  Carbohydrate restricted diet  Continue Gabapentin for neuropathy

## 2021-12-23 NOTE — PROGRESS NOTES
Pastoral Care Progress Note    2021  Patient: Haleigh Cummins : 1965  Admission Date & Time: 2021 1507  MRN: [id number] CSN: [id number]     provided emotional support prior to upcoming surgery  Pt anxious and tearfully emotive  Prayed together            Chaplaincy Interventions Utilized:   Empowerment: Encouraged self-care and Provided anxiety containment  Relationship Building: Cultivated a relationship of care and support and Listened empathically  Ritual: Provided prayer      21 1300   Clinical Encounter Type   Visited With Patient   Routine Visit Follow-up   Jainism Encounters   Jainism Needs Prayer

## 2021-12-23 NOTE — PROGRESS NOTES
Podiatry - Progress Note  Patient: Castro Oquendo 49 y o  female   MRN: [id number]  PCP: Nam Huffman MD  Unit/Bed#: [facility] 933-01 Encounter: [id number]  Date Of Visit: 12/23/21    ASSESSMENT:    Castro Oquendo is a 49 y o  female with:    1  Right medial 1st MTPJ diabetic ulceration- Olguin 3 - POA   -Osteomyelitis of R 1st metatarsal and proximal phalanx on MRI  2  T2DM  3  ESRD on HD  4  Obesity      PLAN:    · Patient to go to OR today,12/23/21, for  Right foot transmetatarsal amputation with Dr Rizwana Carolina  The patient was agreeable to the procedure  Benefits, risks, complications and concerns of procedure discussed with the patients understanding  · R foot MRI showing OM of 1st metatarsal & proximal phalanx  · Consent to be signed with surgeon prior to procedure  · Confirmed NPO status  · H&P, vitals, and current labs reviewed  No acute changes noted  · Alternatives, risks, and complications discussed with patient  · All questions answered  No guarantees given of outcome  · Rest of medical care per primary team         I spent time to discuss with the patient the surgical procedure(s) as amputation of her forefoot,  and post-op course (NWB to surgical foot) required to properly heal the surgery  I discussed risks as infection, scar, swelling, chronic pain, poor healing of incision or bone that could require more surgery, change in shape of foot, or walking and function, numbness, neuritis/RSD, blood clots in the leg or lung, and even death from anesthesia complications  No guarantees were given and the possibility of recurrent infection and possiblility of delayed healing were discussed before patient signed the consent form    The offloading device necessary after the surgery will be surgical shoe  SUBJECTIVE:     The patient was seen, evaluated, and assessed at bedside today  The patient was awake, alert, and in no acute distress  Patient confirmed NPO status   All questions and concerns regarding the surgical procedure addressed  Patient understands risks vs benefits of procedure and remains amenable with plan for surgery today  Patient denies N/V/F/chills/SOB/CP  OBJECTIVE:     Vitals:   /77   Pulse 69   Temp 97 6 °F (36 4 °C) (Oral)   Resp 16   Ht 5' 6" (1 676 m)   Wt 122 kg (268 lb 15 4 oz)   LMP 2016 (Exact Date)   SpO2 96%   BMI 43 41 kg/m²     Temp (24hrs), Av 8 °F (36 6 °C), Min:97 6 °F (36 4 °C), Max:98 1 °F (36 7 °C)      Physical Exam:     General:  Alert, cooperative, and in no distress  Lower extremity exam:  Cardiovascular status at baseline  Neurological status at baseline  Musculoskeletal status at baseline  No calf tenderness noted bilaterally  Dressing left intact to the Operating Room  Additional Data:     Labs:    Results from last 7 days   Lab Units 21  0543   WBC Thousand/uL 5 61   HEMOGLOBIN g/dL 7 1*   HEMATOCRIT % 22 0*   PLATELETS Thousands/uL 191   NEUTROS PCT % 67   LYMPHS PCT % 15   MONOS PCT % 10   EOS PCT % 6     Results from last 7 days   Lab Units 21  0543 21  0842 21  0842   POTASSIUM mmol/L 4 6   < > 4 5   CHLORIDE mmol/L 105   < > 103   CO2 mmol/L 28   < > 25   BUN mg/dL 41*   < > 51*   CREATININE mg/dL 6 59*   < > 8 84*   CALCIUM mg/dL 8 1*   < > 8 2*   ALK PHOS U/L  --   --  141*   ALT U/L  --   --  14   AST U/L  --   --  12    < > = values in this interval not displayed  Results from last 7 days   Lab Units 21  0543   INR  3 19*       * I Have Reviewed All Lab Data Listed Above  Recent Cultures (last 7 days):     Results from last 7 days   Lab Units 21   BLOOD CULTURE   --  No Growth at 48 hrs  No Growth at 48 hrs     GRAM STAIN RESULT  Rare Polys  No bacteria seen  --   --    WOUND CULTURE  2+ Growth of Methicillin Resistant Staphylococcus aureus*  --   --      Results from last 7 days   Lab Units 21   ANAEROBIC CULTURE  No anaerobes isolated       Imaging: I have personally reviewed pertinent films in PACS  EKG, Pathology, and Other Studies: I have personally reviewed pertinent reports  ** Please Note: Portions of the record may have been created with voice recognition software  Occasional wrong word or "sound a like" substitutions may have occurred due to the inherent limitations of voice recognition software  Read the chart carefully and recognize, using context, where substitutions have occurred   **

## 2021-12-23 NOTE — QUICK NOTE
Attempted to see patient but she is in the OR  Chart reviewed  Afebrile, normal WBC  Continue vancomycin for now  Await report of final operative outcome to determine ultimate duration of therapy  I will reassess the patient tomorrow  Please call in the interim with new questions

## 2021-12-23 NOTE — PROGRESS NOTES
Vancomycin IV Pharmacy-to-Dose Consultation    Payton Blackwell is a 49 y o  female who is currently receiving Vancomycin IV with management by the Pharmacy Consult service for the treatment of other bone and joint  Assessment/Plan:    The patient's chart was reviewed  Renal function is stable  There are no signs or symptoms of nephrotoxicity and/or infusion reactions documented  The following nephrotoxicity factors are present:  Patient-Factors: renal dysfunction - TTS dialysis schedule    Based on today's assessment, will continue current vancomycin (Day # 3) dosing of 750 mg post-HD, with a plan for random level to be drawn at 0600 on 12/25  We will continue to follow the patient's culture results and clinical progress daily        Fiordaliza Quinones, PharmD  Pharmacist

## 2021-12-23 NOTE — PHYSICAL THERAPY NOTE
Physical Therapy Cancellation Note     12/23/21 1050   PT Last Visit   PT Visit Date 12/23/21   Note Type   Note type Evaluation; Cancelled Session   Cancel Reasons Patient to operating room     PT orders received, pt chart reviewed  Pt currently pending OR today for "Right foot transmetatarsal amputation"  PT to continue to follow and see pt as appropriate and able         Jose Miguel Mak, PT, DPT

## 2021-12-23 NOTE — PROGRESS NOTES
R TMA surgery cancelled today secondary to patient still on Warfarin with INR of 3 44  If INR can be reversed and obtained in range for surgical procedure within the next 48hours per IM, tentative plan for OR for R TMA with Dr Donn Mott in 2-3 days

## 2021-12-24 LAB
ANION GAP SERPL CALCULATED.3IONS-SCNC: 5 MMOL/L (ref 4–13)
BASOPHILS # BLD AUTO: 0.04 THOUSANDS/ΜL (ref 0–0.1)
BASOPHILS NFR BLD AUTO: 1 % (ref 0–1)
BUN SERPL-MCNC: 41 MG/DL (ref 5–25)
CALCIUM SERPL-MCNC: 8.4 MG/DL (ref 8.3–10.1)
CHLORIDE SERPL-SCNC: 105 MMOL/L (ref 100–108)
CO2 SERPL-SCNC: 26 MMOL/L (ref 21–32)
CREAT SERPL-MCNC: 6.41 MG/DL (ref 0.6–1.3)
EOSINOPHIL # BLD AUTO: 0.43 THOUSAND/ΜL (ref 0–0.61)
EOSINOPHIL NFR BLD AUTO: 8 % (ref 0–6)
ERYTHROCYTE [DISTWIDTH] IN BLOOD BY AUTOMATED COUNT: 14.7 % (ref 11.6–15.1)
GFR SERPL CREATININE-BSD FRML MDRD: 6 ML/MIN/1.73SQ M
GLUCOSE SERPL-MCNC: 129 MG/DL (ref 65–140)
GLUCOSE SERPL-MCNC: 156 MG/DL (ref 65–140)
GLUCOSE SERPL-MCNC: 226 MG/DL (ref 65–140)
GLUCOSE SERPL-MCNC: 320 MG/DL (ref 65–140)
HCT VFR BLD AUTO: 24.2 % (ref 34.8–46.1)
HGB BLD-MCNC: 7.7 G/DL (ref 11.5–15.4)
IMM GRANULOCYTES # BLD AUTO: 0.03 THOUSAND/UL (ref 0–0.2)
IMM GRANULOCYTES NFR BLD AUTO: 1 % (ref 0–2)
INR PPP: 1.33 (ref 0.84–1.19)
LYMPHOCYTES # BLD AUTO: 1.06 THOUSANDS/ΜL (ref 0.6–4.47)
LYMPHOCYTES NFR BLD AUTO: 19 % (ref 14–44)
MCH RBC QN AUTO: 30.7 PG (ref 26.8–34.3)
MCHC RBC AUTO-ENTMCNC: 31.8 G/DL (ref 31.4–37.4)
MCV RBC AUTO: 96 FL (ref 82–98)
MONOCYTES # BLD AUTO: 0.58 THOUSAND/ΜL (ref 0.17–1.22)
MONOCYTES NFR BLD AUTO: 10 % (ref 4–12)
NEUTROPHILS # BLD AUTO: 3.43 THOUSANDS/ΜL (ref 1.85–7.62)
NEUTS SEG NFR BLD AUTO: 61 % (ref 43–75)
NRBC BLD AUTO-RTO: 0 /100 WBCS
PLATELET # BLD AUTO: 213 THOUSANDS/UL (ref 149–390)
PMV BLD AUTO: 10.8 FL (ref 8.9–12.7)
POTASSIUM SERPL-SCNC: 5.1 MMOL/L (ref 3.5–5.3)
PROTHROMBIN TIME: 15.9 SECONDS (ref 11.6–14.5)
RBC # BLD AUTO: 2.51 MILLION/UL (ref 3.81–5.12)
SODIUM SERPL-SCNC: 136 MMOL/L (ref 136–145)
WBC # BLD AUTO: 5.57 THOUSAND/UL (ref 4.31–10.16)

## 2021-12-24 PROCEDURE — 99232 SBSQ HOSP IP/OBS MODERATE 35: CPT | Performed by: INTERNAL MEDICINE

## 2021-12-24 PROCEDURE — 80048 BASIC METABOLIC PNL TOTAL CA: CPT | Performed by: INTERNAL MEDICINE

## 2021-12-24 PROCEDURE — 85025 COMPLETE CBC W/AUTO DIFF WBC: CPT | Performed by: INTERNAL MEDICINE

## 2021-12-24 PROCEDURE — 99233 SBSQ HOSP IP/OBS HIGH 50: CPT | Performed by: INTERNAL MEDICINE

## 2021-12-24 PROCEDURE — 82948 REAGENT STRIP/BLOOD GLUCOSE: CPT

## 2021-12-24 PROCEDURE — 85610 PROTHROMBIN TIME: CPT | Performed by: INTERNAL MEDICINE

## 2021-12-24 RX ORDER — HEPARIN SODIUM 5000 [USP'U]/ML
5000 INJECTION, SOLUTION INTRAVENOUS; SUBCUTANEOUS EVERY 12 HOURS SCHEDULED
Status: DISCONTINUED | OUTPATIENT
Start: 2021-12-24 | End: 2022-01-07

## 2021-12-24 RX ADMIN — ALPRAZOLAM 0.25 MG: 0.25 TABLET ORAL at 21:50

## 2021-12-24 RX ADMIN — GABAPENTIN 100 MG: 100 CAPSULE ORAL at 21:50

## 2021-12-24 RX ADMIN — Medication 3 MG: at 21:50

## 2021-12-24 RX ADMIN — LEVOTHYROXINE SODIUM 50 MCG: 50 TABLET ORAL at 09:56

## 2021-12-24 RX ADMIN — NIFEDIPINE 30 MG: 30 TABLET, FILM COATED, EXTENDED RELEASE ORAL at 09:59

## 2021-12-24 RX ADMIN — GABAPENTIN 100 MG: 100 CAPSULE ORAL at 09:56

## 2021-12-24 RX ADMIN — SEVELAMER HYDROCHLORIDE 1600 MG: 800 TABLET, FILM COATED PARENTERAL at 13:00

## 2021-12-24 RX ADMIN — ACETAMINOPHEN 650 MG: 325 TABLET, FILM COATED ORAL at 14:18

## 2021-12-24 RX ADMIN — OXYCODONE HYDROCHLORIDE AND ACETAMINOPHEN 1 TABLET: 5; 325 TABLET ORAL at 10:04

## 2021-12-24 RX ADMIN — SERTRALINE HYDROCHLORIDE 75 MG: 50 TABLET ORAL at 09:56

## 2021-12-24 RX ADMIN — HEPARIN SODIUM 5000 UNITS: 5000 INJECTION INTRAVENOUS; SUBCUTANEOUS at 21:50

## 2021-12-24 RX ADMIN — NYSTATIN: 100000 POWDER TOPICAL at 09:59

## 2021-12-24 RX ADMIN — INSULIN GLARGINE 20 UNITS: 100 INJECTION, SOLUTION SUBCUTANEOUS at 21:50

## 2021-12-24 RX ADMIN — CARVEDILOL 25 MG: 25 TABLET, FILM COATED ORAL at 17:08

## 2021-12-24 RX ADMIN — LORATADINE 10 MG: 10 TABLET ORAL at 09:57

## 2021-12-24 RX ADMIN — NYSTATIN: 100000 POWDER TOPICAL at 17:15

## 2021-12-24 RX ADMIN — TORSEMIDE 100 MG: 20 TABLET ORAL at 09:56

## 2021-12-24 RX ADMIN — SEVELAMER HYDROCHLORIDE 1600 MG: 800 TABLET, FILM COATED PARENTERAL at 17:08

## 2021-12-24 RX ADMIN — GABAPENTIN 100 MG: 100 CAPSULE ORAL at 17:08

## 2021-12-24 RX ADMIN — INSULIN LISPRO 2 UNITS: 100 INJECTION, SOLUTION INTRAVENOUS; SUBCUTANEOUS at 17:08

## 2021-12-24 RX ADMIN — HEPARIN SODIUM 5000 UNITS: 5000 INJECTION INTRAVENOUS; SUBCUTANEOUS at 11:45

## 2021-12-24 RX ADMIN — OXYCODONE HYDROCHLORIDE AND ACETAMINOPHEN 1 TABLET: 5; 325 TABLET ORAL at 17:08

## 2021-12-24 RX ADMIN — INSULIN LISPRO 3 UNITS: 100 INJECTION, SOLUTION INTRAVENOUS; SUBCUTANEOUS at 17:22

## 2021-12-24 RX ADMIN — CARVEDILOL 25 MG: 25 TABLET, FILM COATED ORAL at 09:59

## 2021-12-24 RX ADMIN — PANTOPRAZOLE SODIUM 40 MG: 40 TABLET, DELAYED RELEASE ORAL at 06:33

## 2021-12-24 RX ADMIN — INSULIN LISPRO 3 UNITS: 100 INJECTION, SOLUTION INTRAVENOUS; SUBCUTANEOUS at 11:45

## 2021-12-24 RX ADMIN — INSULIN LISPRO 3 UNITS: 100 INJECTION, SOLUTION INTRAVENOUS; SUBCUTANEOUS at 11:44

## 2021-12-24 RX ADMIN — PHYTONADIONE 5 MG: 10 INJECTION, EMULSION INTRAMUSCULAR; INTRAVENOUS; SUBCUTANEOUS at 09:51

## 2021-12-24 NOTE — PROGRESS NOTES
[address] [facility]  Progress Note Rosie Sharpe 1965, 49 y o  female MRN: [id number]  Unit/Bed#: [facility] 933-01 Encounter: [id number]  Primary Care Provider: Nam Huffman MD   Date and time admitted to hospital: 12/20/2021  3:07 PM      * Right foot ulceration with osteomyelitis  Assessment & Plan  Plan XR foot noting "Bone destruction involving the medial aspect of the distal 1st metatarsal compatible with acute osteomyelitis"  Follow-up MRI noted to be "consistent with osteomyelitis of the 1st metatarsal and proximal phalanx"  Appreciate infectious disease input -> c/w IV Vancomycin regimen after dialysis sessions -> can not possibly discontinue antibiotic postoperatively if surgical cure attained  Discussed with podiatry -> tentative plan for transmetatarsal amputation tomorrow as INR improved to threshold  PRN pain control - supportive care otherwise    ESRD on hemodialysis  Assessment & Plan  Routine hemodialysis per nephrology  Continue Renagel/Sensipar supplementation    Insulin-dependent diabetes mellitus with neuropathy  Assessment & Plan  Lab Results   Component Value Date    HGBA1C 7 9 (H) 09/30/2021     Continue basal/prandial insulin w/ additional SSI coverage per Accu-Cheks  Carbohydrate restricted diet  Continue Gabapentin for neuropathy    Essential hypertension  Assessment & Plan  Continue Coreg/Procardia XL - PRN IV Hydralazine on board for BP spikes  Optimize pain control    History of venous thromboembolism  Assessment & Plan  Supratherapeutic INR reversed s/p vitamin K - continue to hold Coumadin for tentative operative intervention tomorrow  Trend INR - resume anticoagulation postoperatively when okay w/ podiatry    Hyperlipidemia  Assessment & Plan  Continue Lipitor    Anemia of chronic disease  Assessment & Plan  Monitor H/H    Morbid obesity  Assessment & Plan  BMI of 43 41  Lifestyle/diet modifications      DVT Prophylaxis:  Heparin SC - off Coumadin Patient Centered Rounds:  I have performed bedside rounds and discussed plan of care with nursing today  Discussions with Specialists or Other Care Team Provider:  see above assessments if applicable    Education and Discussions with Family / Patient:  Patient at bedside, who will self-update significant other today - discussed with significant other, at bedside, yesterday    Time Spent for Care:  32 minutes  More than 50% of total time spent on counseling and coordination of care as described above  Current Length of Stay: 4 day(s)    Current Patient Status: Inpatient   Certification Statement:  Patient will continue to require additional hospital stay due to assessments as noted above  Code Status: Level 1 - Full Code        Subjective:     No new complaints this time  Pain is fairly controlled today  She also notes mild improvement in her anxiety  Objective:     Vitals:   Temp (24hrs), Av 1 °F (36 7 °C), Min:97 6 °F (36 4 °C), Max:98 7 °F (37 1 °C)    Temp:  [97 6 °F (36 4 °C)-98 7 °F (37 1 °C)] 98 7 °F (37 1 °C)  HR:  [76-78] 76  Resp:  [18] 18  BP: (127-178)/(58-83) 132/58  SpO2:  [94 %-98 %] 94 %  Body mass index is 43 41 kg/m²  Input and Output Summary (last 24 hours):        Intake/Output Summary (Last 24 hours) at 2021 1347  Last data filed at 2021 0847  Gross per 24 hour   Intake 540 ml   Output --   Net 540 ml       Physical Exam:     GENERAL:  Obese - weak/fatigued  HEAD:  Normocephalic - atraumatic  EYES: PERRL - EOMI   MOUTH:  Mucosa moist  NECK:  Supple - full range of motion  CARDIAC:  Rate controlled - S1/S2 positive  PULMONARY:  Fairly clear to auscultation - nonlabored respirations  ABDOMEN:  Soft - nontender/nondistended - active bowel sounds  MUSCULOSKELETAL:  Motor strength/range of motion deconditioned  NEUROLOGIC:  Alert/oriented at baseline  SKIN:  Chronic wrinkles/blemishes - right foot dressed/wrapped  PSYCHIATRIC:  Mood/affect anxious      Additional Data:     Labs & Recent Cultures:    Results from last 7 days   Lab Units 12/24/21  0806   WBC Thousand/uL 5 57   HEMOGLOBIN g/dL 7 7*   HEMATOCRIT % 24 2*   PLATELETS Thousands/uL 213   NEUTROS PCT % 61   LYMPHS PCT % 19   MONOS PCT % 10   EOS PCT % 8*     Results from last 7 days   Lab Units 12/24/21  0806 12/22/21  0543 12/21/21  0842   POTASSIUM mmol/L 5 1   < > 4 5   CHLORIDE mmol/L 105   < > 103   CO2 mmol/L 26   < > 25   BUN mg/dL 41*   < > 51*   CREATININE mg/dL 6 41*   < > 8 84*   CALCIUM mg/dL 8 4   < > 8 2*   ALK PHOS U/L  --   --  141*   ALT U/L  --   --  14   AST U/L  --   --  12    < > = values in this interval not displayed  Results from last 7 days   Lab Units 12/24/21  0806   INR  1 33*     Results from last 7 days   Lab Units 12/24/21  1104 12/23/21  2051 12/23/21  1636 12/23/21  1119 12/23/21  1012 12/22/21  2043 12/22/21  1656 12/22/21  1120 12/22/21  0900 12/22/21  0731 12/22/21  0701 12/22/21  0641   POC GLUCOSE mg/dl 320* 253* 197* 147* 124 75 125 117 284* 220* 43* 44*                 Results from last 7 days   Lab Units 12/20/21 1954 12/20/21 1953 12/20/21 1952   BLOOD CULTURE   --  No Growth at 72 hrs  No Growth at 72 hrs     GRAM STAIN RESULT  Rare Polys  No bacteria seen  --   --    WOUND CULTURE  2+ Growth of Methicillin Resistant Staphylococcus aureus*  --   --          Last 24 Hours Medication List:   Current Facility-Administered Medications   Medication Dose Route Frequency Provider Last Rate    acetaminophen  650 mg Oral Q6H PRN Janine Wallis MD      albuterol  2 puff Inhalation Q6H PRN Janine Wallis MD      ALPRAZolam  0 25 mg Oral 4x Daily PRN Janine Wallis MD      ammonium lactate   Topical BID PRN MD Linda Buckner carvedilol  25 mg Oral BID With Meals MD Linda Buckner cinacalcet  30 mg Oral Daily Janine Wallis MD      gabapentin  100 mg Oral TID Janine Wallis MD      heparin (porcine)  5,000 Units Subcutaneous Q12H [address] Bisi Richard Xavier MD      hydrALAZINE  5 mg Intravenous Q6H PRN Cielo Montiel, MD Reanna Connell insulin glargine  20 Units Subcutaneous HS Cielo Montiel, MD Reanna Connell insulin lispro  1-5 Units Subcutaneous TID AC Cielo Montiel, MD Reanna Connell insulin lispro  3 Units Subcutaneous TID With Meals Cielo Montiel, MD Reanna Connell levothyroxine  50 mcg Oral Daily Cielo Montiel, MD      melatonin  3 mg Oral HS Cielo Montiel, MD      NIFEdipine  30 mg Oral Daily Cielo Montiel, MD      nystatin   Topical BID Cielo Montiel MD      ondansetron  4 mg Intravenous Q6H PRN Cielo Montiel, MD      oxyCODONE-acetaminophen  1 tablet Oral Q6H PRN Cielo Montiel, MD      pantoprazole  40 mg Oral Early Morning Cielo Montiel, MD      polyethylene glycol  17 g Oral Daily PRN Cielo Montiel, MD      senna  2 tablet Oral HS PRN Cielo Montiel, MD      sertraline  75 mg Oral Daily MD Reanna Link sevelamer  1,600 mg Oral TID With Meals MD Reanna Link torsemide  100 mg Oral Daily Cielo Montiel MD      vancomycin  10 mg/kg (Adjusted) Intravenous After Dialysis Cielo Montiel  mg (12/23/21 0929)            ** Please Note: This note is constructed using a voice recognition dictation system  An occasional wrong word/phrase or sound-a-like substitution may have been picked up by dictation device due to the inherent limitations of voice recognition software  Read the chart carefully and recognize, using reasonable context, where substitutions may have occurred  **

## 2021-12-24 NOTE — OCCUPATIONAL THERAPY NOTE
Occupational Therapy Cancel Note         Patient Name: Arsalan Ambrosio  GXRTI'T Date: 12/24/2021 12/24/21 0837   OT Last Visit   OT Visit Date 12/24/21   Note Type   Note type Evaluation; Cancelled Session         OT orders received  Chart reviewed  Pt was planned for OR yesterday for TMA; however, procedure cancelled 2* INR levels  Per podiatry note, plan for amputation in 2-3 days  Will sign-off at this time  Please re-consult as appropriate post-op  Thank you!     Wale Stafford MS, OTR/L

## 2021-12-24 NOTE — PROGRESS NOTES
Progress Note - Infectious Disease   Rosalva Segura 49 y o  female MRN: [id number]  Unit/Bed#: [facility] 933-01 Encounter: [id number]      Impression/Recommendations:  1   Right foot cellulitis   Due to #2   Blood cultures negative   Clinically stable without sepsis  Improving  Rec:  · Continue antibiotics as below  · Follow temperatures and WBC closely  · Supportive care as per the primary service     2   Chronic right foot DM ulcer with 1st MT/toe osteomyelitis   In setting of #3   PTB by Podiatry  Xray, MRI show bone infection   Wound culture with MRSA  Rec:  · Continue vancomycin for now through the weekend  · Pharmacy consult for vancomycin dosing  · Await TMA per Podiatry next week when INR normalizes  · If able to achieve surgical cure, anticipated D/C of antibiotics postoperatively     3   Recent 1st MTPJ septic arthritis   October 2021  Status post washout   Fluid aspirate with MRSA, OR cultures with Enterococcus, lactobacillus, Clostridium   Status post 6 weeks IV antibiotics through 62/3   Now complicated by above      4   Poorly controlled DM with DPN    Recent A1c September 2021 7 9   Risk factor for above      5   ESRD on HD  Michelle Barge left AVF  Rec:  · Dose adjust antibiotics for HD  · Nephrology follow-up ongoing     The above impression and plan was discussed in detail with the patient  Dr Leonarda Cornejo will reassess the patient on Monday 12/27  Please call in the interim with new questions      Antibiotics:  Vancomycin #3    Subjective:  Patient seen on AM rounds  Denies fevers, chills, sweats, nausea, vomiting, or diarrhea  Remains in good spirits despite needing to stay in hospital for the holiday  24 Hour Events:  No documented fevers, chills, sweats, nausea, vomiting, or diarrhea  OR cancelled yesterday due to elevated INR      Objective:  Vitals:  Temp:  [97 6 °F (36 4 °C)-98 7 °F (37 1 °C)] 98 7 °F (37 1 °C)  HR:  [76-78] 76  Resp:  [18] 18  BP: (127-178)/(58-83) 132/58  SpO2:  [94 %-98 %] 94 %  Temp (24hrs), Av 1 °F (36 7 °C), Min:97 6 °F (36 4 °C), Max:98 7 °F (37 1 °C)  Current: Temperature: 98 7 °F (37 1 °C)    Physical Exam:   General:  No acute distress  Psychiatric:  Awake and alert  Pulmonary:  Normal respiratory excursion without accessory muscle use  Abdomen:  Soft, nontender  Extremities:  Improved erythema right foot  Skin:  No rashes    Lab Results:  I have personally reviewed pertinent labs  Results from last 7 days   Lab Units 21  0806 21  1046 21  0543 21  0842 21  0842   POTASSIUM mmol/L 5 1 4 4 4 6   < > 4 5   CHLORIDE mmol/L 105 101 105   < > 103   CO2 mmol/L 26 27 28   < > 25   BUN mg/dL 41* 21 41*   < > 51*   CREATININE mg/dL 6 41* 3 66* 6 59*   < > 8 84*   EGFR ml/min/1 73sq m 6 13 6   < > 4   CALCIUM mg/dL 8 4 9 0 8 1*   < > 8 2*   AST U/L  --   --   --   --  12   ALT U/L  --   --   --   --  14   ALK PHOS U/L  --   --   --   --  141*    < > = values in this interval not displayed  Results from last 7 days   Lab Units 21  0806 21  1045 21  0543   WBC Thousand/uL 5 57 7 19 5 61   HEMOGLOBIN g/dL 7 7* 8 8* 7 1*   PLATELETS Thousands/uL 213 249 191     Results from last 7 days   Lab Units 21   BLOOD CULTURE   --  No Growth at 72 hrs  No Growth at 72 hrs  GRAM STAIN RESULT  Rare Polys  No bacteria seen  --   --    WOUND CULTURE  2+ Growth of Methicillin Resistant Staphylococcus aureus*  --   --        Imaging Studies:   I have personally reviewed pertinent imaging study reports and images in PACS  EKG, Pathology, and Other Studies:   I have personally reviewed pertinent reports

## 2021-12-24 NOTE — PHYSICAL THERAPY NOTE
Physical Therapy Cancellation Note           12/24/21 1000   PT Last Visit   PT Visit Date 12/24/21   Note Type   Note type Evaluation; Cancelled Session     PT orders received  Chart reviewed  Pt was planned for OR yesterday for TMA; however, procedure cancelled secondaryINR levels  Per podiatry note, plan for amputation in 2-3 days  Will sign-off at this time  Please re-consult as appropriate post-op  Thank you!

## 2021-12-24 NOTE — TREATMENT PLAN
Plan for OR tomorrow with Dr Francine Finn, for R transmetatarsal amputation  NPO at midnight, consent was signed  The patient was agreeable to the procedure  Benefits, risks, complications and concerns of procedure discussed with the patients understanding

## 2021-12-24 NOTE — ASSESSMENT & PLAN NOTE
Supratherapeutic INR reversed s/p vitamin K - continue to hold Coumadin for tentative operative intervention tomorrow  Trend INR - resume anticoagulation postoperatively when okay w/ podiatry

## 2021-12-24 NOTE — ASSESSMENT & PLAN NOTE
Plan XR foot noting "Bone destruction involving the medial aspect of the distal 1st metatarsal compatible with acute osteomyelitis"  Follow-up MRI noted to be "consistent with osteomyelitis of the 1st metatarsal and proximal phalanx"  Appreciate infectious disease input -> c/w IV Vancomycin regimen after dialysis sessions -> can not possibly discontinue antibiotic postoperatively if surgical cure attained  Discussed with podiatry -> tentative plan for transmetatarsal amputation tomorrow as INR improved to threshold  PRN pain control - supportive care otherwise

## 2021-12-25 ENCOUNTER — ANESTHESIA (INPATIENT)
Dept: PERIOP | Facility: HOSPITAL | Age: 56
DRG: 617 | End: 2021-12-25
Payer: MEDICARE

## 2021-12-25 ENCOUNTER — ANESTHESIA EVENT (INPATIENT)
Dept: PERIOP | Facility: HOSPITAL | Age: 56
DRG: 617 | End: 2021-12-25
Payer: MEDICARE

## 2021-12-25 LAB
ANION GAP SERPL CALCULATED.3IONS-SCNC: 4 MMOL/L (ref 4–13)
BACTERIA BLD CULT: NORMAL
BACTERIA BLD CULT: NORMAL
BASOPHILS # BLD AUTO: 0.02 THOUSANDS/ΜL (ref 0–0.1)
BASOPHILS NFR BLD AUTO: 0 % (ref 0–1)
BUN SERPL-MCNC: 59 MG/DL (ref 5–25)
CALCIUM SERPL-MCNC: 8.8 MG/DL (ref 8.3–10.1)
CHLORIDE SERPL-SCNC: 106 MMOL/L (ref 100–108)
CO2 SERPL-SCNC: 25 MMOL/L (ref 21–32)
CREAT SERPL-MCNC: 8.4 MG/DL (ref 0.6–1.3)
EOSINOPHIL # BLD AUTO: 0.41 THOUSAND/ΜL (ref 0–0.61)
EOSINOPHIL NFR BLD AUTO: 7 % (ref 0–6)
ERYTHROCYTE [DISTWIDTH] IN BLOOD BY AUTOMATED COUNT: 14.8 % (ref 11.6–15.1)
GFR SERPL CREATININE-BSD FRML MDRD: 4 ML/MIN/1.73SQ M
GLUCOSE SERPL-MCNC: 185 MG/DL (ref 65–140)
GLUCOSE SERPL-MCNC: 264 MG/DL (ref 65–140)
GLUCOSE SERPL-MCNC: 45 MG/DL (ref 65–140)
GLUCOSE SERPL-MCNC: 49 MG/DL (ref 65–140)
GLUCOSE SERPL-MCNC: 60 MG/DL (ref 65–140)
GLUCOSE SERPL-MCNC: 86 MG/DL (ref 65–140)
GLUCOSE SERPL-MCNC: 87 MG/DL (ref 65–140)
HCT VFR BLD AUTO: 23.7 % (ref 34.8–46.1)
HGB BLD-MCNC: 7.5 G/DL (ref 11.5–15.4)
IMM GRANULOCYTES # BLD AUTO: 0.04 THOUSAND/UL (ref 0–0.2)
IMM GRANULOCYTES NFR BLD AUTO: 1 % (ref 0–2)
INR PPP: 1.23 (ref 0.84–1.19)
LYMPHOCYTES # BLD AUTO: 1.16 THOUSANDS/ΜL (ref 0.6–4.47)
LYMPHOCYTES NFR BLD AUTO: 19 % (ref 14–44)
MCH RBC QN AUTO: 30 PG (ref 26.8–34.3)
MCHC RBC AUTO-ENTMCNC: 31.6 G/DL (ref 31.4–37.4)
MCV RBC AUTO: 95 FL (ref 82–98)
MONOCYTES # BLD AUTO: 0.64 THOUSAND/ΜL (ref 0.17–1.22)
MONOCYTES NFR BLD AUTO: 11 % (ref 4–12)
NEUTROPHILS # BLD AUTO: 3.76 THOUSANDS/ΜL (ref 1.85–7.62)
NEUTS SEG NFR BLD AUTO: 62 % (ref 43–75)
NRBC BLD AUTO-RTO: 0 /100 WBCS
PLATELET # BLD AUTO: 217 THOUSANDS/UL (ref 149–390)
PMV BLD AUTO: 10.1 FL (ref 8.9–12.7)
POTASSIUM SERPL-SCNC: 5.7 MMOL/L (ref 3.5–5.3)
PROTHROMBIN TIME: 15 SECONDS (ref 11.6–14.5)
RBC # BLD AUTO: 2.5 MILLION/UL (ref 3.81–5.12)
SODIUM SERPL-SCNC: 135 MMOL/L (ref 136–145)
WBC # BLD AUTO: 6.03 THOUSAND/UL (ref 4.31–10.16)

## 2021-12-25 PROCEDURE — 30233N1 TRANSFUSION OF NONAUTOLOGOUS RED BLOOD CELLS INTO PERIPHERAL VEIN, PERCUTANEOUS APPROACH: ICD-10-PCS

## 2021-12-25 PROCEDURE — 80048 BASIC METABOLIC PNL TOTAL CA: CPT | Performed by: INTERNAL MEDICINE

## 2021-12-25 PROCEDURE — 99024 POSTOP FOLLOW-UP VISIT: CPT | Performed by: PODIATRIST

## 2021-12-25 PROCEDURE — 85610 PROTHROMBIN TIME: CPT | Performed by: INTERNAL MEDICINE

## 2021-12-25 PROCEDURE — 85025 COMPLETE CBC W/AUTO DIFF WBC: CPT | Performed by: INTERNAL MEDICINE

## 2021-12-25 PROCEDURE — 82948 REAGENT STRIP/BLOOD GLUCOSE: CPT

## 2021-12-25 PROCEDURE — 99232 SBSQ HOSP IP/OBS MODERATE 35: CPT | Performed by: INTERNAL MEDICINE

## 2021-12-25 RX ORDER — METOCLOPRAMIDE HYDROCHLORIDE 5 MG/ML
10 INJECTION INTRAMUSCULAR; INTRAVENOUS EVERY 6 HOURS PRN
Status: DISCONTINUED | OUTPATIENT
Start: 2021-12-25 | End: 2021-12-27

## 2021-12-25 RX ORDER — INSULIN GLARGINE 100 [IU]/ML
10 INJECTION, SOLUTION SUBCUTANEOUS ONCE
Status: COMPLETED | OUTPATIENT
Start: 2021-12-25 | End: 2021-12-25

## 2021-12-25 RX ORDER — BUMETANIDE 0.25 MG/ML
3 INJECTION, SOLUTION INTRAMUSCULAR; INTRAVENOUS ONCE
Status: COMPLETED | OUTPATIENT
Start: 2021-12-25 | End: 2021-12-25

## 2021-12-25 RX ORDER — INSULIN GLARGINE 100 [IU]/ML
20 INJECTION, SOLUTION SUBCUTANEOUS
Status: DISCONTINUED | OUTPATIENT
Start: 2021-12-26 | End: 2022-01-10

## 2021-12-25 RX ORDER — DEXTROSE MONOHYDRATE 25 G/50ML
25 INJECTION, SOLUTION INTRAVENOUS ONCE
Status: COMPLETED | OUTPATIENT
Start: 2021-12-25 | End: 2021-12-25

## 2021-12-25 RX ORDER — METOLAZONE 10 MG/1
10 TABLET ORAL ONCE
Status: COMPLETED | OUTPATIENT
Start: 2021-12-25 | End: 2021-12-25

## 2021-12-25 RX ADMIN — Medication 3 MG: at 21:44

## 2021-12-25 RX ADMIN — ALPRAZOLAM 0.25 MG: 0.25 TABLET ORAL at 09:25

## 2021-12-25 RX ADMIN — CARVEDILOL 25 MG: 25 TABLET, FILM COATED ORAL at 17:10

## 2021-12-25 RX ADMIN — ALPRAZOLAM 0.25 MG: 0.25 TABLET ORAL at 21:49

## 2021-12-25 RX ADMIN — CINACALCET 30 MG: 30 TABLET, FILM COATED ORAL at 09:31

## 2021-12-25 RX ADMIN — LEVOTHYROXINE SODIUM 50 MCG: 50 TABLET ORAL at 09:25

## 2021-12-25 RX ADMIN — SEVELAMER HYDROCHLORIDE 1600 MG: 800 TABLET, FILM COATED PARENTERAL at 17:10

## 2021-12-25 RX ADMIN — INSULIN GLARGINE 10 UNITS: 100 INJECTION, SOLUTION SUBCUTANEOUS at 21:44

## 2021-12-25 RX ADMIN — ALPRAZOLAM 0.25 MG: 0.25 TABLET ORAL at 17:10

## 2021-12-25 RX ADMIN — BUMETANIDE 3 MG: 0.25 INJECTION INTRAMUSCULAR; INTRAVENOUS at 20:09

## 2021-12-25 RX ADMIN — GABAPENTIN 100 MG: 100 CAPSULE ORAL at 21:44

## 2021-12-25 RX ADMIN — CARVEDILOL 25 MG: 25 TABLET, FILM COATED ORAL at 09:25

## 2021-12-25 RX ADMIN — SEVELAMER HYDROCHLORIDE 1600 MG: 800 TABLET, FILM COATED PARENTERAL at 09:25

## 2021-12-25 RX ADMIN — PANTOPRAZOLE SODIUM 40 MG: 40 TABLET, DELAYED RELEASE ORAL at 05:56

## 2021-12-25 RX ADMIN — OXYCODONE HYDROCHLORIDE AND ACETAMINOPHEN 1 TABLET: 5; 325 TABLET ORAL at 00:50

## 2021-12-25 RX ADMIN — TRIMETHOBENZAMIDE HYDROCHLORIDE 200 MG: 100 INJECTION INTRAMUSCULAR at 05:54

## 2021-12-25 RX ADMIN — METOCLOPRAMIDE HYDROCHLORIDE 10 MG: 5 INJECTION INTRAMUSCULAR; INTRAVENOUS at 00:50

## 2021-12-25 RX ADMIN — METOLAZONE 10 MG: 10 TABLET ORAL at 20:09

## 2021-12-25 RX ADMIN — INSULIN LISPRO 1 UNITS: 100 INJECTION, SOLUTION INTRAVENOUS; SUBCUTANEOUS at 13:20

## 2021-12-25 RX ADMIN — GABAPENTIN 100 MG: 100 CAPSULE ORAL at 17:10

## 2021-12-25 RX ADMIN — NIFEDIPINE 30 MG: 30 TABLET, FILM COATED, EXTENDED RELEASE ORAL at 09:32

## 2021-12-25 RX ADMIN — SERTRALINE HYDROCHLORIDE 75 MG: 50 TABLET ORAL at 09:25

## 2021-12-25 RX ADMIN — TORSEMIDE 100 MG: 20 TABLET ORAL at 09:25

## 2021-12-25 RX ADMIN — INSULIN LISPRO 3 UNITS: 100 INJECTION, SOLUTION INTRAVENOUS; SUBCUTANEOUS at 13:21

## 2021-12-25 RX ADMIN — DEXTROSE MONOHYDRATE 25 ML: 500 INJECTION PARENTERAL at 09:25

## 2021-12-25 RX ADMIN — GABAPENTIN 100 MG: 100 CAPSULE ORAL at 09:25

## 2021-12-25 RX ADMIN — INSULIN HUMAN 10 UNITS: 100 INJECTION, SOLUTION PARENTERAL at 13:20

## 2021-12-25 RX ADMIN — NYSTATIN: 100000 POWDER TOPICAL at 17:15

## 2021-12-25 RX ADMIN — HEPARIN SODIUM 5000 UNITS: 5000 INJECTION INTRAVENOUS; SUBCUTANEOUS at 09:25

## 2021-12-25 RX ADMIN — HEPARIN SODIUM 5000 UNITS: 5000 INJECTION INTRAVENOUS; SUBCUTANEOUS at 21:44

## 2021-12-25 RX ADMIN — SEVELAMER HYDROCHLORIDE 1600 MG: 800 TABLET, FILM COATED PARENTERAL at 13:33

## 2021-12-25 NOTE — ASSESSMENT & PLAN NOTE
Continue Coreg/Demadex/Procardia XL - PRN IV Hydralazine on board for BP spikes  Optimize pain control

## 2021-12-25 NOTE — QUICK NOTE
Surgery canceled this morning due to hyperkalemia on most recent labs  Nephrology will be able to get her into dialysis tomorrow morning 12/26/21 and medially treat hyperkalemia today  Tentative plan for OR tomorrow with Dr Giacomo Zhong for R TMA, pending dialysis and lab work

## 2021-12-25 NOTE — ASSESSMENT & PLAN NOTE
Supratherapeutic INR reversed s/p vitamin K - continue to hold Coumadin for operative intervention tomorrow  Trend INR - resume anticoagulation postoperatively when okay w/ podiatry

## 2021-12-25 NOTE — PROGRESS NOTES
Podiatry - Progress Note  Patient: Mitch Rodrigez 49 y o  female   MRN: [id number]  PCP: Mike Koch MD  Unit/Bed#: [facility] 933-01 Encounter: [id number]  Date Of Visit: 12/25/21    ASSESSMENT:    Mitch Rodrigez is a 49 y o  female with:    1  Right medial 1st MTPJ diabetic ulceration- [place]guin 3 - POA              -Osteomyelitis of R 1st metatarsal and proximal phalanx on MRI  2  T2DM  3  ESRD on HD  4  Obesity      PLAN:  · Patient to go to OR today,12/25/21, for  Right transmetatarsal amputation with Dr Luca Gonzales  · Consent to be signed with surgeon prior to procedure  · Confirmed NPO status  · H&P, vitals, and current labs reviewed  No acute changes noted  · Alternatives, risks, and complications discussed with patient  · All questions answered  No guarantees given of outcome  · Rest of medical care per primary team         I spent time to discuss with the patient the surgical procedure(s) as Right transmetatarsal amputation, and post-op course (NWB to RLE) required to properly heal the surgery  I discussed risks as infection, scar, swelling, chronic pain, poor healing of incision or bone that could require more surgery, change in shape of foot, toe, or walking and function, numbness, neuritis/RSD, blood clots in the leg or lung, and even death from anesthesia complications  No guarantees were given and the possibility of recurrent deformity or incomplete correction were discussed before patient signed the consent form  The offloading device necessary after the surgery will be surgical shoe  SUBJECTIVE:     The patient was seen, evaluated, and assessed at bedside today  The patient was awake, alert, and in no acute distress  Patient confirmed NPO status  All questions and concerns regarding the surgical procedure addressed  Patient understands risks vs benefits of procedure and remains amenable with plan for surgery today  Patient denies N/V/F/chills/SOB/CP        OBJECTIVE:     Vitals:   /89   Pulse 76   Temp (!) 97 4 °F (36 3 °C)   Resp 18   Ht 5' 6" (1 676 m)   Wt 122 kg (268 lb 15 4 oz)   LMP 2016 (Exact Date)   SpO2 94%   BMI 43 41 kg/m²     Temp (24hrs), Av 1 °F (36 7 °C), Min:97 4 °F (36 3 °C), Max:98 7 °F (37 1 °C)      Physical Exam:     General:  Alert, cooperative, and in no distress  Lower extremity exam:  Cardiovascular status at baseline  Neurological status at baseline  Musculoskeletal status at baseline  No calf tenderness noted bilaterally  Dressing left intact to the Operating Room  Additional Data:     Labs:    Results from last 7 days   Lab Units 21  0640   WBC Thousand/uL 6 03   HEMOGLOBIN g/dL 7 5*   HEMATOCRIT % 23 7*   PLATELETS Thousands/uL 217   NEUTROS PCT % 62   LYMPHS PCT % 19   MONOS PCT % 11   EOS PCT % 7*     Results from last 7 days   Lab Units 21  0640 21  0543 21  0842   POTASSIUM mmol/L 5 7*   < > 4 5   CHLORIDE mmol/L 106   < > 103   CO2 mmol/L 25   < > 25   BUN mg/dL 59*   < > 51*   CREATININE mg/dL 8 40*   < > 8 84*   CALCIUM mg/dL 8 8   < > 8 2*   ALK PHOS U/L  --   --  141*   ALT U/L  --   --  14   AST U/L  --   --  12    < > = values in this interval not displayed  Results from last 7 days   Lab Units 21  0806   INR  1 33*       * I Have Reviewed All Lab Data Listed Above  Recent Cultures (last 7 days):     Results from last 7 days   Lab Units 21   BLOOD CULTURE   --  No Growth After 4 Days  No Growth After 4 Days  GRAM STAIN RESULT  Rare Polys  No bacteria seen  --   --    WOUND CULTURE  2+ Growth of Methicillin Resistant Staphylococcus aureus*  --   --      Results from last 7 days   Lab Units 21   ANAEROBIC CULTURE  No anaerobes isolated       Imaging: I have personally reviewed pertinent films in PACS  EKG, Pathology, and Other Studies: I have personally reviewed pertinent reports      ** Please Note: Portions of the record may have been created with voice recognition software  Occasional wrong word or "sound a like" substitutions may have occurred due to the inherent limitations of voice recognition software  Read the chart carefully and recognize, using context, where substitutions have occurred   **

## 2021-12-25 NOTE — PROGRESS NOTES
[address] [facility]  Progress Note Kayleigh Patel 1965, 49 y o  female MRN: [id number]  Unit/Bed#: [facility] 933-01 Encounter: [id number]  Primary Care Provider: Amber Jackson MD   Date and time admitted to hospital: 12/20/2021  3:07 PM      * Right foot ulceration with osteomyelitis  Assessment & Plan  Plan XR foot noting "Bone destruction involving the medial aspect of the distal 1st metatarsal compatible with acute osteomyelitis"  Follow-up MRI noted to be "consistent with osteomyelitis of the 1st metatarsal and proximal phalanx"  Appreciate infectious disease input -> c/w IV Vancomycin regimen after dialysis sessions -> can possibly discontinue antibiotic postoperatively if surgical cure attained  Discussed with podiatry -> tentative plan for transmetatarsal amputation delayed until tomorrow due to hyperkalemia (will require dialysis preoperatively tomorrow morning)  PRN pain control - supportive care otherwise    ESRD on hemodialysis  Assessment & Plan  Routine hemodialysis per nephrology  Continue Renagel/Sensipar supplementation    Insulin-dependent diabetes mellitus with neuropathy  Assessment & Plan  Lab Results   Component Value Date    HGBA1C 7 9 (H) 09/30/2021     Continue basal/prandial insulin w/ additional SSI coverage per Accu-Cheks  Carbohydrate restricted diet  Continue Gabapentin for neuropathy    Essential hypertension  Assessment & Plan  Continue Coreg/Demadex/Procardia XL - PRN IV Hydralazine on board for BP spikes  Optimize pain control    History of venous thromboembolism  Assessment & Plan  Supratherapeutic INR reversed s/p vitamin K - continue to hold Coumadin for operative intervention tomorrow  Trend INR - resume anticoagulation postoperatively when okay w/ podiatry    Hyperlipidemia  Assessment & Plan  Continue Lipitor    Anemia of chronic disease  Assessment & Plan  Monitor H/H    Morbid obesity  Assessment & Plan  BMI of 43 41  Lifestyle/diet modifications      DVT Prophylaxis:  Heparin SC - holding Coumadin      Patient Centered Rounds:  I have performed bedside rounds and discussed plan of care with nursing today  Discussions with Specialists or Other Care Team Provider:  see above assessments if applicable    Education and Discussions with Family / Patient:  Patient at bedside, who will self-update significant other today    Time Spent for Care:  32 minutes  More than 50% of total time spent on counseling and coordination of care as described above  Current Length of Stay: 5 day(s)    Current Patient Status: Inpatient   Certification Statement:  Patient will continue to require additional hospital stay due to assessments as noted above  Code Status: Level 1 - Full Code        Subjective:     No new complaints this time  Pain is waxing/waning but relatively controlled today  Patient acknowledges postponement of surgical intervention until tomorrow  Objective:     Vitals:   Temp (24hrs), Av 4 °F (36 3 °C), Min:97 4 °F (36 3 °C), Max:97 4 °F (36 3 °C)    Temp:  [97 4 °F (36 3 °C)] 97 4 °F (36 3 °C)  Resp:  [18] 18  BP: (141-159)/(66-89) 159/66  Body mass index is 43 41 kg/m²  Input and Output Summary (last 24 hours):        Intake/Output Summary (Last 24 hours) at 2021 1506  Last data filed at 2021 1808  Gross per 24 hour   Intake 118 ml   Output --   Net 118 ml       Physical Exam:     GENERAL:  Obese - weak/fatigued  HEAD:  Normocephalic - atraumatic  EYES: PERRL - EOMI   MOUTH:  Mucosa moist  NECK:  Supple - full range of motion  CARDIAC:  Rate controlled - S1/S2 positive  PULMONARY:  Fairly clear to auscultation - nonlabored respirations  ABDOMEN:  Soft - nontender/nondistended - active bowel sounds  MUSCULOSKELETAL:  Motor strength/range of motion deconditioned  NEUROLOGIC:  Alert/oriented at baseline  SKIN:  Chronic wrinkles/blemishes - right foot dressed/wrapped  PSYCHIATRIC:  Mood/affect anxious      Additional Data:     Labs & Recent Cultures:    Results from last 7 days   Lab Units 12/25/21  0640   WBC Thousand/uL 6 03   HEMOGLOBIN g/dL 7 5*   HEMATOCRIT % 23 7*   PLATELETS Thousands/uL 217   NEUTROS PCT % 62   LYMPHS PCT % 19   MONOS PCT % 11   EOS PCT % 7*     Results from last 7 days   Lab Units 12/25/21  0640 12/22/21  0543 12/21/21  0842   POTASSIUM mmol/L 5 7*   < > 4 5   CHLORIDE mmol/L 106   < > 103   CO2 mmol/L 25   < > 25   BUN mg/dL 59*   < > 51*   CREATININE mg/dL 8 40*   < > 8 84*   CALCIUM mg/dL 8 8   < > 8 2*   ALK PHOS U/L  --   --  141*   ALT U/L  --   --  14   AST U/L  --   --  12    < > = values in this interval not displayed  Results from last 7 days   Lab Units 12/25/21  0640   INR  1 23*     Results from last 7 days   Lab Units 12/25/21  1141 12/25/21  0934 12/24/21  2139 12/24/21  1621 12/24/21  1104 12/23/21  2051 12/23/21  1636 12/23/21  1119 12/23/21  1012 12/22/21  2043 12/22/21  1656 12/22/21  1120   POC GLUCOSE mg/dl 185* 87 156* 226* 320* 253* 197* 147* 124 75 125 117                 Results from last 7 days   Lab Units 12/20/21 1954 12/20/21 1953 12/20/21 1952   BLOOD CULTURE   --  No Growth After 4 Days  No Growth After 4 Days     GRAM STAIN RESULT  Rare Polys  No bacteria seen  --   --    WOUND CULTURE  2+ Growth of Methicillin Resistant Staphylococcus aureus*  --   --          Last 24 Hours Medication List:   Current Facility-Administered Medications   Medication Dose Route Frequency Provider Last Rate    acetaminophen  650 mg Oral Q6H PRN Kaitlyn Hernandez MD      albuterol  2 puff Inhalation Q6H PRN Kaitlyn Hernandez MD      ALPRAZolam  0 25 mg Oral 4x Daily PRN Kaitlyn Hernandez MD      ammonium lactate   Topical BID PRN Kaitlyn Hernandez MD      bumetanide  3 mg Intravenous Once MD Grecia Rice carvedilol  25 mg Oral BID With Meals MD Grecia Rice cinacalcet  30 mg Oral Daily Kaitlyn Hernandez MD      gabapentin  100 mg Oral TID MD Grecia Rice heparin (porcine)  5,000 Units Subcutaneous Q12H [address] Violeta English, MD      hydrALAZINE  5 mg Intravenous Q6H PRN Violeta English, MD Love Payne insulin glargine  20 Units Subcutaneous HS Violeta English, MD Love Payne insulin lispro  1-5 Units Subcutaneous TID AC MD Love Woodson insulin lispro  3 Units Subcutaneous TID With Meals MD Love Woodson levothyroxine  50 mcg Oral Daily Violeta English, MD      melatonin  3 mg Oral HS Violeta English, MD      metoclopramide  10 mg Intravenous Q6H PRN Violeta English, MD      metolazone  10 mg Oral Once Violeta English, MD      NIFEdipine  30 mg Oral Daily Violeta English, MD      nystatin   Topical BID Violeta English, MD      oxyCODONE-acetaminophen  1 tablet Oral Q6H PRN Violeta English, MD      pantoprazole  40 mg Oral Early Morning Violeta English MD      polyethylene glycol  17 g Oral Daily PRN Violeta English, MD      senna  2 tablet Oral HS PRN Violeta English, MD      sertraline  75 mg Oral Daily Violeta English, MD Love Payne sevelamer  1,600 mg Oral TID With Meals MD Love Woodson torsemide  100 mg Oral Daily Violeta English MD      vancomycin  10 mg/kg (Adjusted) Intravenous After Dialysis Violeta English  mg (12/23/21 0929)                  ** Please Note: This note is constructed using a voice recognition dictation system  An occasional wrong word/phrase or sound-a-like substitution may have been picked up by dictation device due to the inherent limitations of voice recognition software  Read the chart carefully and recognize, using reasonable context, where substitutions may have occurred  **

## 2021-12-25 NOTE — ASSESSMENT & PLAN NOTE
Plan XR foot noting "Bone destruction involving the medial aspect of the distal 1st metatarsal compatible with acute osteomyelitis"  Follow-up MRI noted to be "consistent with osteomyelitis of the 1st metatarsal and proximal phalanx"  Appreciate infectious disease input -> c/w IV Vancomycin regimen after dialysis sessions -> can possibly discontinue antibiotic postoperatively if surgical cure attained  Discussed with podiatry -> tentative plan for transmetatarsal amputation delayed until tomorrow due to hyperkalemia (will require dialysis preoperatively tomorrow morning)  PRN pain control - supportive care otherwise

## 2021-12-25 NOTE — ANESTHESIA PREPROCEDURE EVALUATION
Procedure:  AMPUTATION TRANSMETATARSAL (TMA) (Right Foot)    ? Popliteal block? ESRD on HD    Hx of CHF  EF 65%, DD1,     K 5 7, Cr  8 8, Hgb 7 5    Relevant Problems   CARDIO   (+) Essential hypertension   (+) Hyperlipidemia   (+) Parenchymal renal hypertension      ENDO   (+) Acquired hypothyroidism   (+) Insulin-dependent diabetes mellitus with neuropathy   (+) Secondary hyperparathyroidism of renal origin (HCC)      GI/HEPATIC   (+) Esophageal reflux   (+) Gastroesophageal reflux disease      /RENAL   (+) ESRD on hemodialysis   (+) Hemodialysis status (HCC)   (+) Parenchymal renal hypertension      HEMATOLOGY   (+) Anemia of chronic disease      MUSCULOSKELETAL   (+) Low back pain with sciatica   (+) Primary osteoarthritis of right knee      NEURO/PSYCH   (+) Anxiety disorder   (+) Chronic pain syndrome   (+) Diabetic polyneuropathy associated with type 2 diabetes mellitus (HCC)   (+) History of claustrophobia   (+) History of venous thromboembolism   (+) Legal blindness, as defined in [place] [facility]      PULMONARY   (+) Dyspnea   (+) Healthcare-associated pneumonia   (+) Pneumonia             Anesthesia Plan  ASA Score- 3     Anesthesia Type- general with ASA Monitors  Additional Monitors:   Airway Plan: LMA  Comment: Risks/benefits and alternatives discussed with patient including likely possibility of PONV and sore throat, as well as the rare possibilities of aspiration, dental/oropharyngeal/ocular injuries, or grave/life threatening anesthetic and surgical emergencies          Plan Factors-Exercise tolerance (METS): >4 METS  Patient summary reviewed  Patient instructed to abstain from smoking on day of procedure  Patient did not smoke on day of surgery  Induction- intravenous  Postoperative Plan- Plan for postoperative opioid use  Planned trial extubation    Informed Consent- Anesthetic plan and risks discussed with patient    I personally reviewed this patient with the CRNA  Discussed and agreed on the Anesthesia Plan with the CRNA Gerhardt Sep

## 2021-12-25 NOTE — PROGRESS NOTES
Vancomycin IV Pharmacy-to-Dose Consultation    Barb Tse is a 49 y o  female who is currently receiving Vancomycin IV with management by the Pharmacy Consult service  Assessment/Plan:  The patient was reviewed  Renal function is stable and no signs or symptoms of nephrotoxicity and/or infusion reactions were documented in the chart  Patient originally scheduled for HD on Tu, Th, and Sat  Today's HD session was rescheduled for tomorrow (12/26)  Therefore, will not draw level this morning and will not give a dose either  Will continue current vancomycin (day # 5) dosing of 750 mg post-HD, with a plan for trough to be drawn prior to HD at 0600 on 12/26  We will continue to follow the patients culture results and clinical progress daily      Marlou Severance, Pharmacist

## 2021-12-25 NOTE — TREATMENT PLAN
Patient potassium 5 7 mEq this a m  for now will medically treat plan for dialysis 1st thing tomorrow 12/26/2021

## 2021-12-25 NOTE — PLAN OF CARE
Problem: Potential for Falls  Goal: Patient will remain free of falls  Description: INTERVENTIONS:  - Educate patient/family on patient safety including physical limitations  - Instruct patient to call for assistance with activity   - Consult OT/PT to assist with strengthening/mobility   - Keep Call bell within reach  - Keep bed low and locked with side rails adjusted as appropriate  - Keep care items and personal belongings within reach  - Initiate and maintain comfort rounds  - Make Fall Risk Sign visible to staff  - Apply yellow socks and bracelet for high fall risk patients  - Consider moving patient to room near nurses station  Outcome: Progressing     Problem: METABOLIC, FLUID AND ELECTROLYTES - ADULT  Goal: Electrolytes maintained within normal limits  Description: INTERVENTIONS:  - Monitor labs and assess patient for signs and symptoms of electrolyte imbalances  - Administer electrolyte replacement as ordered  - Monitor response to electrolyte replacements, including repeat lab results as appropriate  - Instruct patient on fluid and nutrition as appropriate  Outcome: Progressing  Goal: Fluid balance maintained  Description: INTERVENTIONS:  - Monitor labs   - Monitor I/O and WT  - Instruct patient on fluid and nutrition as appropriate  - Assess for signs & symptoms of volume excess or deficit  Outcome: Progressing     Problem: PAIN - ADULT  Goal: Verbalizes/displays adequate comfort level or baseline comfort level  Description: Interventions:  - Encourage patient to monitor pain and request assistance  - Assess pain using appropriate pain scale  - Administer analgesics based on type and severity of pain and evaluate response  - Implement non-pharmacological measures as appropriate and evaluate response  - Consider cultural and social influences on pain and pain management  - Notify physician/advanced practitioner if interventions unsuccessful or patient reports new pain  Outcome: Progressing     Problem: INFECTION - ADULT  Goal: Absence or prevention of progression during hospitalization  Description: INTERVENTIONS:  - Assess and monitor for signs and symptoms of infection  - Monitor lab/diagnostic results  - Monitor all insertion sites, i e  indwelling lines, tubes, and drains  - Monitor endotracheal if appropriate and nasal secretions for changes in amount and color  - [place] appropriate cooling/warming therapies per order  - Administer medications as ordered  - Instruct and encourage patient and family to use good hand hygiene technique  - Identify and instruct in appropriate isolation precautions for identified infection/condition  Outcome: Progressing  Goal: Absence of fever/infection during neutropenic period  Description: INTERVENTIONS:  - Monitor WBC    Outcome: Progressing     Problem: SAFETY ADULT  Goal: Patient will remain free of falls  Description: INTERVENTIONS:  - Educate patient/family on patient safety including physical limitations  - Instruct patient to call for assistance with activity   - Consult OT/PT to assist with strengthening/mobility   - Keep Call bell within reach  - Keep bed low and locked with side rails adjusted as appropriate  - Keep care items and personal belongings within reach  - Initiate and maintain comfort rounds  - Make Fall Risk Sign visible to staff  - Apply yellow socks and bracelet for high fall risk patients  - Consider moving patient to room near nurses station  Outcome: Progressing  Goal: Maintain or return to baseline ADL function  Description: INTERVENTIONS:  -  Assess patient's ability to carry out ADLs; assess patient's baseline for ADL function and identify physical deficits which impact ability to perform ADLs (bathing, care of mouth/teeth, toileting, grooming, dressing, etc )  - Assess/evaluate cause of self-care deficits   - Assess range of motion  - Assess patient's mobility; develop plan if impaired  - Assess patient's need for assistive devices and provide as appropriate  - Encourage maximum independence but intervene and supervise when necessary  - Involve family in performance of ADLs  - Assess for home care needs following discharge   - Consider OT consult to assist with ADL evaluation and planning for discharge  - Provide patient education as appropriate  Outcome: Progressing  Goal: Maintains/Returns to pre admission functional level  Description: INTERVENTIONS:  - Perform BMAT or MOVE assessment daily    - Set and communicate daily mobility goal to care team and patient/family/caregiver  - Collaborate with rehabilitation services on mobility goals if consulted  - Out of bed for toileting  - Record patient progress and toleration of activity level   Outcome: Progressing     Problem: DISCHARGE PLANNING  Goal: Discharge to home or other facility with appropriate resources  Description: INTERVENTIONS:  - Identify barriers to discharge w/patient and caregiver  - Arrange for needed discharge resources and transportation as appropriate  - Identify discharge learning needs (meds, wound care, etc )  - Arrange for interpretive services to assist at discharge as needed  - Refer to Case Management Department for coordinating discharge planning if the patient needs post-hospital services based on physician/advanced practitioner order or complex needs related to functional status, cognitive ability, or social support system  Outcome: Progressing     Problem: Knowledge Deficit  Goal: Patient/family/caregiver demonstrates understanding of disease process, treatment plan, medications, and discharge instructions  Description: Complete learning assessment and assess knowledge base    Interventions:  - Provide teaching at level of understanding  - Provide teaching via preferred learning methods  Outcome: Progressing

## 2021-12-25 NOTE — PROGRESS NOTES
Vancomycin IV Pharmacy-to-Dose Consultation    Whit Novoa is a 49 y o  female who is currently receiving Vancomycin IV with management by the Pharmacy Consult service  Assessment/Plan:  The patient was reviewed  Renal function is stable and no signs or symptoms of nephrotoxicity and/or infusion reactions were documented in the chart  Based on todays assessment, continue current vancomycin (day #4) dosing of 750mg after HD, with a plan for trough to be drawn at 0600 on 12/25  We will continue to follow the patients culture results and clinical progress daily      Betty MontoyaD

## 2021-12-26 ENCOUNTER — APPOINTMENT (INPATIENT)
Dept: DIALYSIS | Facility: HOSPITAL | Age: 56
DRG: 617 | End: 2021-12-26
Payer: MEDICARE

## 2021-12-26 ENCOUNTER — ANESTHESIA (INPATIENT)
Dept: PERIOP | Facility: HOSPITAL | Age: 56
DRG: 617 | End: 2021-12-26
Payer: MEDICARE

## 2021-12-26 ENCOUNTER — ANESTHESIA EVENT (INPATIENT)
Dept: PERIOP | Facility: HOSPITAL | Age: 56
DRG: 617 | End: 2021-12-26
Payer: MEDICARE

## 2021-12-26 ENCOUNTER — APPOINTMENT (INPATIENT)
Dept: RADIOLOGY | Facility: HOSPITAL | Age: 56
DRG: 617 | End: 2021-12-26
Payer: MEDICARE

## 2021-12-26 LAB
ANION GAP SERPL CALCULATED.3IONS-SCNC: 5 MMOL/L (ref 4–13)
ANION GAP SERPL CALCULATED.3IONS-SCNC: 7 MMOL/L (ref 4–13)
BASOPHILS # BLD AUTO: 0.03 THOUSANDS/ΜL (ref 0–0.1)
BASOPHILS NFR BLD AUTO: 0 % (ref 0–1)
BUN SERPL-MCNC: 30 MG/DL (ref 5–25)
BUN SERPL-MCNC: 74 MG/DL (ref 5–25)
CALCIUM SERPL-MCNC: 8.2 MG/DL (ref 8.3–10.1)
CALCIUM SERPL-MCNC: 8.7 MG/DL (ref 8.3–10.1)
CHLORIDE SERPL-SCNC: 101 MMOL/L (ref 100–108)
CHLORIDE SERPL-SCNC: 105 MMOL/L (ref 100–108)
CO2 SERPL-SCNC: 22 MMOL/L (ref 21–32)
CO2 SERPL-SCNC: 27 MMOL/L (ref 21–32)
CREAT SERPL-MCNC: 4.78 MG/DL (ref 0.6–1.3)
CREAT SERPL-MCNC: 9.7 MG/DL (ref 0.6–1.3)
EOSINOPHIL # BLD AUTO: 0.39 THOUSAND/ΜL (ref 0–0.61)
EOSINOPHIL NFR BLD AUTO: 6 % (ref 0–6)
ERYTHROCYTE [DISTWIDTH] IN BLOOD BY AUTOMATED COUNT: 14.6 % (ref 11.6–15.1)
GFR SERPL CREATININE-BSD FRML MDRD: 4 ML/MIN/1.73SQ M
GFR SERPL CREATININE-BSD FRML MDRD: 9 ML/MIN/1.73SQ M
GLUCOSE SERPL-MCNC: 136 MG/DL (ref 65–140)
GLUCOSE SERPL-MCNC: 159 MG/DL (ref 65–140)
GLUCOSE SERPL-MCNC: 230 MG/DL (ref 65–140)
GLUCOSE SERPL-MCNC: 94 MG/DL (ref 65–140)
GLUCOSE SERPL-MCNC: 96 MG/DL (ref 65–140)
HCT VFR BLD AUTO: 23.3 % (ref 34.8–46.1)
HGB BLD-MCNC: 7.6 G/DL (ref 11.5–15.4)
IMM GRANULOCYTES # BLD AUTO: 0.06 THOUSAND/UL (ref 0–0.2)
IMM GRANULOCYTES NFR BLD AUTO: 1 % (ref 0–2)
INR PPP: 1.11 (ref 0.84–1.19)
LYMPHOCYTES # BLD AUTO: 1.26 THOUSANDS/ΜL (ref 0.6–4.47)
LYMPHOCYTES NFR BLD AUTO: 19 % (ref 14–44)
MCH RBC QN AUTO: 30.5 PG (ref 26.8–34.3)
MCHC RBC AUTO-ENTMCNC: 32.6 G/DL (ref 31.4–37.4)
MCV RBC AUTO: 94 FL (ref 82–98)
MONOCYTES # BLD AUTO: 0.58 THOUSAND/ΜL (ref 0.17–1.22)
MONOCYTES NFR BLD AUTO: 9 % (ref 4–12)
NEUTROPHILS # BLD AUTO: 4.46 THOUSANDS/ΜL (ref 1.85–7.62)
NEUTS SEG NFR BLD AUTO: 65 % (ref 43–75)
NRBC BLD AUTO-RTO: 0 /100 WBCS
PLATELET # BLD AUTO: 224 THOUSANDS/UL (ref 149–390)
PMV BLD AUTO: 10.3 FL (ref 8.9–12.7)
POTASSIUM SERPL-SCNC: 4.2 MMOL/L (ref 3.5–5.3)
POTASSIUM SERPL-SCNC: 5.7 MMOL/L (ref 3.5–5.3)
PROTHROMBIN TIME: 13.9 SECONDS (ref 11.6–14.5)
RBC # BLD AUTO: 2.49 MILLION/UL (ref 3.81–5.12)
SODIUM SERPL-SCNC: 132 MMOL/L (ref 136–145)
SODIUM SERPL-SCNC: 135 MMOL/L (ref 136–145)
VANCOMYCIN SERPL-MCNC: 19.1 UG/ML
WBC # BLD AUTO: 6.78 THOUSAND/UL (ref 4.31–10.16)

## 2021-12-26 PROCEDURE — NC001 PR NO CHARGE: Performed by: PODIATRIST

## 2021-12-26 PROCEDURE — 0Y6M0Z9 DETACHMENT AT RIGHT FOOT, PARTIAL 1ST RAY, OPEN APPROACH: ICD-10-PCS | Performed by: PODIATRIST

## 2021-12-26 PROCEDURE — C9290 INJ, BUPIVACAINE LIPOSOME: HCPCS | Performed by: ANESTHESIOLOGY

## 2021-12-26 PROCEDURE — 73630 X-RAY EXAM OF FOOT: CPT

## 2021-12-26 PROCEDURE — 88311 DECALCIFY TISSUE: CPT | Performed by: PATHOLOGY

## 2021-12-26 PROCEDURE — 85025 COMPLETE CBC W/AUTO DIFF WBC: CPT | Performed by: INTERNAL MEDICINE

## 2021-12-26 PROCEDURE — 82948 REAGENT STRIP/BLOOD GLUCOSE: CPT

## 2021-12-26 PROCEDURE — 80202 ASSAY OF VANCOMYCIN: CPT | Performed by: INTERNAL MEDICINE

## 2021-12-26 PROCEDURE — 80048 BASIC METABOLIC PNL TOTAL CA: CPT | Performed by: INTERNAL MEDICINE

## 2021-12-26 PROCEDURE — 0Y6M0ZC DETACHMENT AT RIGHT FOOT, PARTIAL 3RD RAY, OPEN APPROACH: ICD-10-PCS | Performed by: PODIATRIST

## 2021-12-26 PROCEDURE — 80048 BASIC METABOLIC PNL TOTAL CA: CPT

## 2021-12-26 PROCEDURE — 88305 TISSUE EXAM BY PATHOLOGIST: CPT | Performed by: PATHOLOGY

## 2021-12-26 PROCEDURE — 0Y6M0ZB DETACHMENT AT RIGHT FOOT, PARTIAL 2ND RAY, OPEN APPROACH: ICD-10-PCS | Performed by: PODIATRIST

## 2021-12-26 PROCEDURE — 99232 SBSQ HOSP IP/OBS MODERATE 35: CPT | Performed by: INTERNAL MEDICINE

## 2021-12-26 PROCEDURE — 28805 AMPUTATION THRU METATARSAL: CPT | Performed by: PODIATRIST

## 2021-12-26 PROCEDURE — 85610 PROTHROMBIN TIME: CPT | Performed by: INTERNAL MEDICINE

## 2021-12-26 PROCEDURE — 90935 HEMODIALYSIS ONE EVALUATION: CPT | Performed by: INTERNAL MEDICINE

## 2021-12-26 RX ORDER — BUPIVACAINE HYDROCHLORIDE 5 MG/ML
INJECTION, SOLUTION PERINEURAL
Status: COMPLETED | OUTPATIENT
Start: 2021-12-26 | End: 2021-12-26

## 2021-12-26 RX ORDER — FENTANYL CITRATE/PF 50 MCG/ML
25 SYRINGE (ML) INJECTION
Status: DISCONTINUED | OUTPATIENT
Start: 2021-12-26 | End: 2022-01-11

## 2021-12-26 RX ORDER — MIDAZOLAM HYDROCHLORIDE 2 MG/2ML
INJECTION, SOLUTION INTRAMUSCULAR; INTRAVENOUS AS NEEDED
Status: DISCONTINUED | OUTPATIENT
Start: 2021-12-26 | End: 2021-12-26

## 2021-12-26 RX ORDER — SODIUM CHLORIDE 9 MG/ML
INJECTION, SOLUTION INTRAVENOUS CONTINUOUS PRN
Status: DISCONTINUED | OUTPATIENT
Start: 2021-12-26 | End: 2021-12-26

## 2021-12-26 RX ORDER — SODIUM CHLORIDE 9 MG/ML
20 INJECTION, SOLUTION INTRAVENOUS CONTINUOUS
Status: DISCONTINUED | OUTPATIENT
Start: 2021-12-26 | End: 2021-12-26

## 2021-12-26 RX ORDER — PROPOFOL 10 MG/ML
INJECTION, EMULSION INTRAVENOUS CONTINUOUS PRN
Status: DISCONTINUED | OUTPATIENT
Start: 2021-12-26 | End: 2021-12-26

## 2021-12-26 RX ORDER — ONDANSETRON 2 MG/ML
4 INJECTION INTRAMUSCULAR; INTRAVENOUS ONCE AS NEEDED
Status: DISCONTINUED | OUTPATIENT
Start: 2021-12-26 | End: 2022-01-06

## 2021-12-26 RX ORDER — DEXMEDETOMIDINE HYDROCHLORIDE 100 UG/ML
INJECTION, SOLUTION INTRAVENOUS AS NEEDED
Status: DISCONTINUED | OUTPATIENT
Start: 2021-12-26 | End: 2021-12-26

## 2021-12-26 RX ADMIN — LEVOTHYROXINE SODIUM 50 MCG: 50 TABLET ORAL at 16:38

## 2021-12-26 RX ADMIN — PHENYLEPHRINE HYDROCHLORIDE 30 MCG/MIN: 10 INJECTION INTRAVENOUS at 13:54

## 2021-12-26 RX ADMIN — BUPIVACAINE HYDROCHLORIDE 10 ML: 5 INJECTION, SOLUTION PERINEURAL at 13:07

## 2021-12-26 RX ADMIN — PANTOPRAZOLE SODIUM 40 MG: 40 TABLET, DELAYED RELEASE ORAL at 05:07

## 2021-12-26 RX ADMIN — ACETAMINOPHEN 650 MG: 325 TABLET, FILM COATED ORAL at 17:39

## 2021-12-26 RX ADMIN — BUPIVACAINE 20 ML: 13.3 INJECTION, SUSPENSION, LIPOSOMAL INFILTRATION at 13:07

## 2021-12-26 RX ADMIN — GABAPENTIN 100 MG: 100 CAPSULE ORAL at 21:34

## 2021-12-26 RX ADMIN — SERTRALINE HYDROCHLORIDE 75 MG: 50 TABLET ORAL at 16:38

## 2021-12-26 RX ADMIN — PROPOFOL 60 MCG/KG/MIN: 10 INJECTION, EMULSION INTRAVENOUS at 13:40

## 2021-12-26 RX ADMIN — DEXMEDETOMIDINE HCL 8 MCG: 100 INJECTION INTRAVENOUS at 13:47

## 2021-12-26 RX ADMIN — VANCOMYCIN HYDROCHLORIDE 750 MG: 750 INJECTION, SOLUTION INTRAVENOUS at 11:19

## 2021-12-26 RX ADMIN — SEVELAMER HYDROCHLORIDE 1600 MG: 800 TABLET, FILM COATED PARENTERAL at 16:38

## 2021-12-26 RX ADMIN — CINACALCET 30 MG: 30 TABLET, FILM COATED ORAL at 17:39

## 2021-12-26 RX ADMIN — INSULIN LISPRO 3 UNITS: 100 INJECTION, SOLUTION INTRAVENOUS; SUBCUTANEOUS at 17:39

## 2021-12-26 RX ADMIN — SODIUM CHLORIDE: 0.9 INJECTION, SOLUTION INTRAVENOUS at 13:29

## 2021-12-26 RX ADMIN — Medication 3 MG: at 21:34

## 2021-12-26 RX ADMIN — TORSEMIDE 100 MG: 20 TABLET ORAL at 16:38

## 2021-12-26 RX ADMIN — NIFEDIPINE 30 MG: 30 TABLET, FILM COATED, EXTENDED RELEASE ORAL at 16:39

## 2021-12-26 RX ADMIN — ALPRAZOLAM 0.25 MG: 0.25 TABLET ORAL at 18:20

## 2021-12-26 RX ADMIN — INSULIN GLARGINE 20 UNITS: 100 INJECTION, SOLUTION SUBCUTANEOUS at 21:34

## 2021-12-26 RX ADMIN — HEPARIN SODIUM 5000 UNITS: 5000 INJECTION INTRAVENOUS; SUBCUTANEOUS at 21:35

## 2021-12-26 RX ADMIN — VANCOMYCIN HYDROCHLORIDE 750 MG: 1 INJECTION, POWDER, LYOPHILIZED, FOR SOLUTION INTRAVENOUS at 18:20

## 2021-12-26 RX ADMIN — ALPRAZOLAM 0.25 MG: 0.25 TABLET ORAL at 08:02

## 2021-12-26 RX ADMIN — CARVEDILOL 25 MG: 25 TABLET, FILM COATED ORAL at 16:38

## 2021-12-26 RX ADMIN — MIDAZOLAM 2 MG: 1 INJECTION INTRAMUSCULAR; INTRAVENOUS at 13:29

## 2021-12-26 RX ADMIN — OXYCODONE HYDROCHLORIDE AND ACETAMINOPHEN 1 TABLET: 5; 325 TABLET ORAL at 21:34

## 2021-12-26 NOTE — OP NOTE
OPERATIVE REPORT - Podiatry  PATIENT NAME: Josef William    :  1965  MRN: [id number]  Pt Location: [facility] OR ROOM 08    SURGERY DATE: 2021    Surgeon(s) and Role:     * Yosi Montes DPM - Primary     * Jaswinder Hill DPM - Assisting    Pre-op Diagnosis:  Ulcer of right foot with other severity ([address] ) [L97 518]    Post-Op Diagnosis Codes:     * Ulcer of right foot with other severity ([address] ) [L97 518]    Procedure(s) (LRB):  AMPUTATION TRANSMETATARSAL (TMA) (Right)    Specimen(s):  ID Type Source Tests Collected by Time Destination   1 : right transmeteral amputation Tissue Foot, Right TISSUE EXAM Yosi Montes DPM 2021 1400        Estimated Blood Loss:   250 mL    Drains:  * No LDAs found *    Anesthesia Type:   Popliteal block    Hemostasis:  Pneumatic ankle tourniquet set at 275mmHg for 48 mins  Direct compression, electrocautery    Materials:  * No implants in log *      Injectables:  None    Operative Findings:  Consistent with Diagnosis    Complications:   None    Procedure and Technique:     Under mild sedation, the patient was brought into the operating room and placed on the operating room table in the supine position  IV sedation and a popliteal block was achieved by anesthesia team and a universal timeout was performed where all parties are in agreement of correct patient, correct procedure and correct site  A pneumatic tourniquet was then placed over the patient's right lower extremity with ample padding  The pneumatic tourniquet was then inflated to 275 mmHg  A fishmouth type incision was drawn out over the forefoot  Utilizing a #15 blade a full thickness incision was made down to bone  The forefoot was then sharply dissected out to isolate the metatarsal shafts  Utilizing a key elevator soft tissues were freed from each of the metatarsals  A sagittal saw was then used to make the transmetatarsal amputation osteotomies   Each metatarsal was cut in a dorsal distal to plantar proximal fashion with care taken to maintain the metatarsal parabola  The entire forefoot was then removed from the table and passed off  The remaining wound bed was then inspected  No remaining purulent sinus tracts were visualized  Any necrotic or nonviable soft tissues were sharply excised from the wound utilizing metzenbaum scissors  Any residual exposed tendons were excised proximally to prevent any infection from tracking proximally  Bones proximal to the amputation site was noted to be of hard viable quality  The remaining surgical wound was red granular with adequate bleeding noted  The wound was then flushed with copious amounts of normal sterile saline  The surgical incision was irrigated with copious amounts of normal sterile saline  Skin edges were reapproximated and closure was obtained utilizing 4-0 and 3-0 nylon  The foot was then cleansed and dried  The incision site was dressed with xeroform, gauze, ABD pad  This was then covered with a Kerlex, cast padding and an ACE wrap followed by another layer of cast padding and ACE wrap  The tourniquet was deflated at approximately 48 min and normal hyperemic response was noted to TMA stump  The patient tolerated the procedure and anesthesia well without immediate complications and transferred to PACU with vital signs stable  As with many limb salvage procedures, we contemplate the possibility of performing further stages to this procedure  Procedures may include debridements, delayed closure, plastic surgery techniques, or more proximal amputations  This procedure may be considered part of a multi-staged limb salvage treatment plan  Dr Natasha Lange was present during the entire procedure and participated in all key aspects  SIGNATURE: Eddie Akbar DPM  DATE: December 26, 2021  TIME: 2:53 PM      Portions of the record may have been created with voice recognition software   Occasional wrong word or "sound a like" substitutions may have occurred due to the inherent limitations of voice recognition software  Read the chart carefully and recognize, using context, where substitutions have occurred

## 2021-12-26 NOTE — ASSESSMENT & PLAN NOTE
Supratherapeutic INR previously reversed s/p vitamin K - continue to hold Coumadin for operative intervention  Trend INR - resume anticoagulation postoperatively when okay w/ podiatry

## 2021-12-26 NOTE — ASSESSMENT & PLAN NOTE
Plan XR foot noting "Bone destruction involving the medial aspect of the distal 1st metatarsal compatible with acute osteomyelitis"  Follow-up MRI noted to be "consistent with osteomyelitis of the 1st metatarsal and proximal phalanx"  Appreciate infectious disease input -> c/w IV Vancomycin regimen after dialysis sessions -> can possibly discontinue antibiotic postoperatively if surgical cure attained  Discussed with podiatry -> tentative plan for transmetatarsal amputation later today contingent on resolution of hyperkalemia (currently undergoing preoperative dialysis)  PRN pain control - supportive care otherwise

## 2021-12-26 NOTE — PROGRESS NOTES
Follow up Consultation    Nephrology   Cam Meeks 49 y o  female MRN: [id number]  Unit/Bed#: [facility] 933-01 Encounter: [id number]      Physician Requesting Consult: Eli Weaver MD        ASSESSMENT/PLAN:  61-year-old female with multiple comorbidities including hypertension, diabetes, hyperlipidemia, GERD, obesity, DVT and ESRD (TTS) admitted with right foot pain  Nephrology following for dialysis needs  ESRD:   gets usual dialysis at [address] on TTS schedule  access:  Left AV fistula  estimated dry weight:  121 1 kilos  last dialysis treatment on 12/26/2021  next dialysis treatment on 12/28/2021   Aiming for UF close to 3 kilos with treatment today, try to get to dry weight  check BMP, phosphorus with dialysis  renal diet when allowed diet order  avoid nephrotoxins, adjust meds to appropriate GFR    H&H/anemia:  most recent hemoglobin at 7 6 grams/deciliter  maintain hemoglobin 10-12 grams/deciliter  Recommendations:  Avoid IV iron due to concern for infection no MITCHELL due to history of PE  Consider PRBC transfusion per primary team    Acid-base electrolytes:  Hyperkalemia:  Doing 1K bath the last 2 hour  Most recent potassium at 5 7 mEq  Sodium, phosphorus, potassium, acidosis to be corrected with dialysis    Blood pressure/hypertension:   Current medications: continue Coreg 25 mg p o  B i d  Procardia XL 30 mg p o  Q day, torsemide 100 mg p o  Q day   Recommendations:  continue without change increase UF with treatment today  Bone mineral disease/secondary hyperparathyroidism of renal origin:   Recommendations: continue Renvela 2 tabs p o  T i d , Sensipar 30 mg p o  Q day  Follow-up intact PTH as an outpatient    Other medical problems:  Diabetes:  Management primary team, on insulin  Right foot ulcer/osteomyelitis:  Management per primary team   Follow-up with podiatry  Plan for OR for right foot transmetatarsal amputation    MRI confirmation  for osteomyelitis        Thanks for the consult  Will continue to follow  Please call with questions  Above-mentioned orders and Plan in terms of dialysis was discussed with the team in [address] MD, FASN, 2021, 6:50 AM        Objective :  Patient seen and examined while on hemodialysis at 9:15 a m  Tolerating dialysis well aiming for UF close to 3 kilos with treatment today  Plan for later today  Doing 1K bath the last 2 hours for hyperkalemia  PHYSICAL EXAM  /68   Pulse 85   Temp 97 5 °F (36 4 °C)   Resp 20   Ht 5' 6" (1 676 m)   Wt 122 kg (268 lb 15 4 oz)   LMP 2016 (Exact Date)   SpO2 94%   BMI 43 41 kg/m²   Temp (24hrs), Av 7 °F (36 5 °C), Min:97 4 °F (36 3 °C), Max:98 2 °F (36 8 °C)        Intake/Output Summary (Last 24 hours) at 2021 0650  Last data filed at 2021 1823  Gross per 24 hour   Intake 260 ml   Output --   Net 260 ml       I/O last 24 hours: In: 260 [P O :260]  Out: -       Current Weight: Weight - Scale: 122 kg (268 lb 15 4 oz)  First Weight: Weight - Scale: 122 kg (268 lb 15 4 oz)  Physical Exam  Vitals and nursing note reviewed  Constitutional:       General: She is not in acute distress  Appearance: Normal appearance  She is obese  She is not ill-appearing, toxic-appearing or diaphoretic  HENT:      Head: Normocephalic and atraumatic  Mouth/Throat:      Pharynx: No oropharyngeal exudate  Eyes:      General: No scleral icterus  Conjunctiva/sclera: Conjunctivae normal    Cardiovascular:      Rate and Rhythm: Normal rate  Heart sounds: Normal heart sounds  No friction rub  Pulmonary:      Effort: Pulmonary effort is normal  No respiratory distress  Breath sounds: Normal breath sounds  No stridor  No wheezing  Abdominal:      General: There is no distension  Palpations: Abdomen is soft  There is no mass  Tenderness: There is no abdominal tenderness  Musculoskeletal:         General: Swelling present        Cervical back: Normal range of motion and neck supple  Comments: Left AV fistula   Skin:     General: Skin is warm  Coloration: Skin is not jaundiced  Neurological:      General: No focal deficit present  Mental Status: She is alert and oriented to person, place, and time  Psychiatric:         Mood and Affect: Mood normal          Behavior: Behavior normal              Review of Systems   Constitutional: Negative for chills, fatigue and fever  HENT: Negative for congestion  Respiratory: Negative for cough, shortness of breath and wheezing  Cardiovascular: Positive for leg swelling  Gastrointestinal: Positive for diarrhea  Negative for abdominal pain, nausea and vomiting  Musculoskeletal: Negative for back pain  Skin: Positive for wound  Neurological: Negative for headaches  Psychiatric/Behavioral: Negative for agitation and confusion  All other systems reviewed and are negative        Scheduled Meds:  Current Facility-Administered Medications   Medication Dose Route Frequency Provider Last Rate    acetaminophen  650 mg Oral Q6H PRN Nita Cha MD      albuterol  2 puff Inhalation Q6H PRN Nita Cha MD      ALPRAZolam  0 25 mg Oral 4x Daily PRN Nita Cha MD      ammonium lactate   Topical BID PRN MD Becky Raya carvedilol  25 mg Oral BID With Meals MD Becky Raya cinacalcet  30 mg Oral Daily Nita Cha MD      gabapentin  100 mg Oral TID Nita Cha MD      heparin (porcine)  5,000 Units Subcutaneous Q12H [address] Nita Cha MD      hydrALAZINE  5 mg Intravenous Q6H PRN Nita Cha MD      insulin glargine  20 Units Subcutaneous HS Aleksandra Mon PA-C      insulin lispro  1-5 Units Subcutaneous TID AC MD Becky Raya insulin lispro  3 Units Subcutaneous TID With Meals MD Becky Raya levothyroxine  50 mcg Oral Daily Nita Cha MD      melatonin  3 mg Oral HS Nita Cha MD      metoclopramide  10 mg Intravenous Q6H PRN Nita Cha MD      NIFEdipine 30 mg Oral Daily Nita Cha MD      nystatin   Topical BID Nita Cha MD      oxyCODONE-acetaminophen  1 tablet Oral Q6H PRN Nita Cha MD      pantoprazole  40 mg Oral Early Morning Nita Cha MD      polyethylene glycol  17 g Oral Daily PRN Nita Cha MD      senna  2 tablet Oral HS PRN Nita Cha MD      sertraline  75 mg Oral Daily MD Becky Raya sevelamer  1,600 mg Oral TID With Meals MD Becky Raya torsemide  100 mg Oral Daily Nita Cha MD      vancomycin  10 mg/kg (Adjusted) Intravenous After Dialysis Nita Cha  mg (12/23/21 0929)       PRN Meds:   acetaminophen    albuterol    ALPRAZolam    ammonium lactate    hydrALAZINE    metoclopramide    oxyCODONE-acetaminophen    polyethylene glycol    senna    vancomycin    Continuous Infusions:       Invasive Devices:    Invasive Devices  Report    Peripheral Intravenous Line            Peripheral IV 12/23/21 Right;Ventral (anterior) Hand 3 days          Line            Hemodialysis AV Fistula 02/20/18 Left Forearm 1404 days                  LABORATORY:    Results from last 7 days   Lab Units 12/25/21  0640 12/24/21  0806 12/23/21  1046 12/23/21  1045 12/22/21  0543 12/21/21  0842 12/20/21  1619   WBC Thousand/uL 6 03 5 57  --  7 19 5 61 5 69 6 46   HEMOGLOBIN g/dL 7 5* 7 7*  --  8 8* 7 1* 7 5* 7 8*   HEMATOCRIT % 23 7* 24 2*  --  27 0* 22 0* 23 0* 24 1*   PLATELETS Thousands/uL 217 213  --  249 191 203 195   POTASSIUM mmol/L 5 7* 5 1 4 4  --  4 6 4 5 4 4   CHLORIDE mmol/L 106 105 101  --  105 103 103   CO2 mmol/L 25 26 27  --  28 25 26   BUN mg/dL 59* 41* 21  --  41* 51* 44*   CREATININE mg/dL 8 40* 6 41* 3 66*  --  6 59* 8 84* 7 78*   CALCIUM mg/dL 8 8 8 4 9 0  --  8 1* 8 2* 9 0   MAGNESIUM mg/dL  --   --   --   --   --  2 1  --    PHOSPHORUS mg/dL  --   --   --   --   --  4 1  --       rest all reviewed    RADIOLOGY:  MRI foot/forefoot toes right wo contrast   Final Result by Ade Carcamo MD (12/22 7415)      Findings consistent with osteomyelitis of the 1st metatarsal and proximal phalanx      The study was marked in EPIC for immediate notification  Workstation performed: [id number]         XR foot 3+ views RIGHT   Final Result by Eula Sinha MD (12/21 9683)      Bone destruction involving the medial aspect of the distal 1st metatarsal compatible with acute osteomyelitis  Postoperative changes  Workstation performed: [id number]           Rest all reviewed    Portions of the record may have been created with voice recognition software  Occasional wrong word or "sound a like" substitutions may have occurred due to the inherent limitations of voice recognition software  Read the chart carefully and recognize, using context, where substitutions have occurred  If you have any questions, please contact the dictating provider

## 2021-12-26 NOTE — PLAN OF CARE
Problem: Potential for Falls  Goal: Patient will remain free of falls  Description: INTERVENTIONS:  - Educate patient/family on patient safety including physical limitations  - Instruct patient to call for assistance with activity   - Consult OT/PT to assist with strengthening/mobility   - Keep Call bell within reach  - Keep bed low and locked with side rails adjusted as appropriate  - Keep care items and personal belongings within reach  - Initiate and maintain comfort rounds  - Make Fall Risk Sign visible to staff  - Apply yellow socks and bracelet for high fall risk patients  - Consider moving patient to room near nurses station  Outcome: Progressing     Problem: METABOLIC, FLUID AND ELECTROLYTES - ADULT  Goal: Electrolytes maintained within normal limits  Description: INTERVENTIONS:  - Monitor labs and assess patient for signs and symptoms of electrolyte imbalances  - Administer electrolyte replacement as ordered  - Monitor response to electrolyte replacements, including repeat lab results as appropriate  - Instruct patient on fluid and nutrition as appropriate  Outcome: Progressing  Goal: Fluid balance maintained  Description: INTERVENTIONS:  - Monitor labs   - Monitor I/O and WT  - Instruct patient on fluid and nutrition as appropriate  - Assess for signs & symptoms of volume excess or deficit  Outcome: Progressing     Problem: PAIN - ADULT  Goal: Verbalizes/displays adequate comfort level or baseline comfort level  Description: Interventions:  - Encourage patient to monitor pain and request assistance  - Assess pain using appropriate pain scale  - Administer analgesics based on type and severity of pain and evaluate response  - Implement non-pharmacological measures as appropriate and evaluate response  - Consider cultural and social influences on pain and pain management  - Notify physician/advanced practitioner if interventions unsuccessful or patient reports new pain  Outcome: Progressing     Problem: SAFETY ADULT  Goal: Patient will remain free of falls  Description: INTERVENTIONS:  - Educate patient/family on patient safety including physical limitations  - Instruct patient to call for assistance with activity   - Consult OT/PT to assist with strengthening/mobility   - Keep Call bell within reach  - Keep bed low and locked with side rails adjusted as appropriate  - Keep care items and personal belongings within reach  - Initiate and maintain comfort rounds  - Make Fall Risk Sign visible to staff  - Apply yellow socks and bracelet for high fall risk patients  - Consider moving patient to room near nurses station  Outcome: Progressing

## 2021-12-26 NOTE — PROGRESS NOTES
Vancomycin IV Pharmacy-to-Dose Consultation    Mellissa Stein is a 49 y o  female who is currently receiving Vancomycin IV with management by the Pharmacy Consult service for the treatment of right foot cellulitis, toe osteomyelitis, septic arthritis  Assessment/Plan:    The patient's chart was reviewed  Patient is ESRD on IHD TTS, plan for HD today  There are no signs or symptoms of infusion reactions documented  Based on todays assessment and pre-HD level of 19 1 (no dialysis yesterday), will continue current vancomycin (Day # 6) dosing of 750 mg IV after dialysis TTS, with a plan for trough to be drawn at 0600 on 12/30 (pre-HD level)  Pharmacy will continue to follow closely for s/sx of nephrotoxicity, infusion reactions and appropriateness of therapy  BMP and CBC will be ordered per protocol  We will continue to follow the patients culture results and clinical progress daily        Pebbles Paris, PharmD, [address] Clinical Pharmacist  [phone]

## 2021-12-26 NOTE — PROGRESS NOTES
Podiatry - Progress Note  Patient: Duane Horton 49 y o  female   MRN: [id number]  PCP: Monica Pinedo MD  Unit/Bed#: [facility] 933-01 Encounter: [id number]  Date Of Visit: 12/26/21    ASSESSMENT:    Duane Horton is a 49 y o  female with:    1  Right medial 1st MTPJ diabetic ulceration- [place] 3 - POA              -Osteomyelitis of R 1st metatarsal and proximal phalanx on MRI  2  T2DM  3  ESRD on HD  4  Obesity      PLAN:    · Patient to go to OR today,12/26/21, for  Right transmetatarsal amputation with Dr Shabazz Friends  · Consent to be signed with surgeon prior to procedure  · Confirmed NPO status  · H&P, vitals, and current labs reviewed  No acute changes noted  · Alternatives, risks, and complications discussed with patient  · All questions answered  No guarantees given of outcome  · Rest of medical care per primary team      I spent time to discuss with the patient the surgical procedure as Right transmetatarsal amputation, and post-op course (NWB to RLE) required to properly heal the surgery  I discussed risks as infection, scar, swelling, chronic pain, poor healing of incision or bone that could require more surgery, change in shape of foot, toe, or walking and function, numbness, neuritis/RSD, blood clots in the leg or lung, and even death from anesthesia complications  No guarantees were given and the possibility of recurrent deformity or incomplete correction were discussed before patient signed the consent form  The offloading device necessary after the surgery will be surgical shoe  SUBJECTIVE:     The patient was seen, evaluated, and assessed at bedside today  The patient was awake, alert, and in no acute distress  Patient confirmed NPO status  All questions and concerns regarding the surgical procedure addressed  Patient understands risks vs benefits of procedure and remains amenable with plan for surgery today  Patient denies N/V/F/chills/SOB/CP        OBJECTIVE:     Vitals:   /57   Pulse 69 Temp 97 6 °F (36 4 °C)   Resp 18   Ht 5' 6" (1 676 m)   Wt 122 kg (268 lb 15 4 oz)   LMP 2016 (Exact Date)   SpO2 94%   BMI 43 41 kg/m²     Temp (24hrs), Av 8 °F (36 6 °C), Min:97 5 °F (36 4 °C), Max:98 2 °F (36 8 °C)      Physical Exam:     General:  Alert, cooperative, and in no distress  Lower extremity exam:  Cardiovascular status at baseline  Neurological status at baseline  Musculoskeletal status at baseline  No calf tenderness noted bilaterally  Dressing left intact to the Operating Room  Additional Data:     Labs:    Results from last 7 days   Lab Units 21  0823   WBC Thousand/uL 6 78   HEMOGLOBIN g/dL 7 6*   HEMATOCRIT % 23 3*   PLATELETS Thousands/uL 224   NEUTROS PCT % 65   LYMPHS PCT % 19   MONOS PCT % 9   EOS PCT % 6     Results from last 7 days   Lab Units 21  0823 21  0543 21  0842   POTASSIUM mmol/L 5 7*   < > 4 5   CHLORIDE mmol/L 105   < > 103   CO2 mmol/L 22   < > 25   BUN mg/dL 74*   < > 51*   CREATININE mg/dL 9 70*   < > 8 84*   CALCIUM mg/dL 8 7   < > 8 2*   ALK PHOS U/L  --   --  141*   ALT U/L  --   --  14   AST U/L  --   --  12    < > = values in this interval not displayed  Results from last 7 days   Lab Units 21  0823   INR  1 11       * I Have Reviewed All Lab Data Listed Above  Recent Cultures (last 7 days):     Results from last 7 days   Lab Units 21   BLOOD CULTURE   --  No Growth After 5 Days  No Growth After 5 Days  GRAM STAIN RESULT  Rare Polys  No bacteria seen  --   --    WOUND CULTURE  2+ Growth of Methicillin Resistant Staphylococcus aureus*  --   --      Results from last 7 days   Lab Units 21   ANAEROBIC CULTURE  No anaerobes isolated       Imaging: I have personally reviewed pertinent films in PACS  EKG, Pathology, and Other Studies: I have personally reviewed pertinent reports      ** Please Note: Portions of the record may have been created with voice recognition software  Occasional wrong word or "sound a like" substitutions may have occurred due to the inherent limitations of voice recognition software  Read the chart carefully and recognize, using context, where substitutions have occurred   **

## 2021-12-26 NOTE — PROGRESS NOTES
[address] [facility]  Progress Note Oma Agarwal 1965, 49 y o  female MRN: [id number]  Unit/Bed#: [facility] 933-01 Encounter: [id number]  Primary Care Provider: Adrian Chavez MD   Date and time admitted to hospital: 12/20/2021  3:07 PM      * Right foot ulceration with osteomyelitis  Assessment & Plan  Plan XR foot noting "Bone destruction involving the medial aspect of the distal 1st metatarsal compatible with acute osteomyelitis"  Follow-up MRI noted to be "consistent with osteomyelitis of the 1st metatarsal and proximal phalanx"  Appreciate infectious disease input -> c/w IV Vancomycin regimen after dialysis sessions -> can possibly discontinue antibiotic postoperatively if surgical cure attained  Discussed with podiatry -> tentative plan for transmetatarsal amputation later today contingent on resolution of hyperkalemia (currently undergoing preoperative dialysis)  PRN pain control - supportive care otherwise    ESRD on hemodialysis  Assessment & Plan  Routine hemodialysis per nephrology  Continue Renagel/Sensipar supplementation    Insulin-dependent diabetes mellitus with neuropathy  Assessment & Plan  Lab Results   Component Value Date    HGBA1C 7 9 (H) 09/30/2021     Continue basal/prandial insulin w/ additional SSI coverage per Accu-Cheks  Carbohydrate restricted diet  Continue Gabapentin for neuropathy    Essential hypertension  Assessment & Plan  Continue Coreg/Demadex/Procardia XL - PRN IV Hydralazine on board for BP spikes  Low-sodium diet  Optimize pain control    History of venous thromboembolism  Assessment & Plan  Supratherapeutic INR previously reversed s/p vitamin K - continue to hold Coumadin for operative intervention  Trend INR - resume anticoagulation postoperatively when okay w/ podiatry    Hyperlipidemia  Assessment & Plan  Continue Lipitor    Anemia of chronic disease  Assessment & Plan  Monitor H/H      DVT Prophylaxis:  Heparin SC - holding Coumadin preoperatively       Patient Centered Rounds:  I have performed bedside rounds and discussed plan of care with nursing today  Discussions with Specialists or Other Care Team Provider:  see above assessments if applicable    Education and Discussions with Family / Patient:  Patient at bedside, who will self-update significant other  Time Spent for Care:  32 minutes  More than 50% of total time spent on counseling and coordination of care as described above  Current Length of Stay: 6 day(s)    Current Patient Status: Inpatient   Certification Statement:  Patient will continue to require additional hospital stay due to assessments as noted above  Code Status: Level 1 - Full Code        Subjective:     Seen/examined earlier today while undergoing hemodialysis  Patient is hopeful that with correction of elevated potassium level, she will be cleared for operative intervention later today  Pain is waxing/waning but relatively controlled currently  Objective:     Vitals:   Temp (24hrs), Av 3 °F (36 3 °C), Min:96 6 °F (35 9 °C), Max:97 6 °F (36 4 °C)    Temp:  [96 6 °F (35 9 °C)-97 6 °F (36 4 °C)] 97 4 °F (36 3 °C)  HR:  [62-85] 70  Resp:  [15-22] 22  BP: ()/(38-83) 181/79  SpO2:  [100 %] 100 %  Body mass index is 43 41 kg/m²  Input and Output Summary (last 24 hours):        Intake/Output Summary (Last 24 hours) at 2021 1645  Last data filed at 2021 1447  Gross per 24 hour   Intake 1110 ml   Output 4039 ml   Net -2929 ml       Physical Exam:     GENERAL:  Weak/fatigued - obese  HEAD:  Normocephalic - atraumatic  EYES: PERRL - EOMI   MOUTH:  Mucosa moist  NECK:  Supple - full range of motion  CARDIAC:  Rate controlled - S1/S2 positive  PULMONARY:  Fairly clear to auscultation - nonlabored respirations  ABDOMEN:  Soft - nontender/nondistended - active bowel sounds  MUSCULOSKELETAL:  Motor strength/range of motion deconditioned  NEUROLOGIC:  Alert/oriented at baseline  SKIN: Chronic wrinkles/blemishes - right foot dressed/wrapped  PSYCHIATRIC:  Mood/affect mildly anxious      Additional Data:     Labs & Recent Cultures:    Results from last 7 days   Lab Units 12/26/21  0823   WBC Thousand/uL 6 78   HEMOGLOBIN g/dL 7 6*   HEMATOCRIT % 23 3*   PLATELETS Thousands/uL 224   NEUTROS PCT % 65   LYMPHS PCT % 19   MONOS PCT % 9   EOS PCT % 6     Results from last 7 days   Lab Units 12/26/21  1501 12/22/21  0543 12/21/21  0842   POTASSIUM mmol/L 4 2   < > 4 5   CHLORIDE mmol/L 101   < > 103   CO2 mmol/L 27   < > 25   BUN mg/dL 30*   < > 51*   CREATININE mg/dL 4 78*   < > 8 84*   CALCIUM mg/dL 8 2*   < > 8 2*   ALK PHOS U/L  --   --  141*   ALT U/L  --   --  14   AST U/L  --   --  12    < > = values in this interval not displayed  Results from last 7 days   Lab Units 12/26/21  0823   INR  1 11     Results from last 7 days   Lab Units 12/26/21  1602 12/26/21  0736 12/25/21  2111 12/25/21  1705 12/25/21  1647 12/25/21  1646 12/25/21  1141 12/25/21  0934 12/24/21  2139 12/24/21  1621 12/24/21  1104 12/23/21  2051   POC GLUCOSE mg/dl 96 159* 264* 60* 49* 45* 185* 87 156* 226* 320* 253*                 Results from last 7 days   Lab Units 12/20/21 1954 12/20/21 1953 12/20/21 1952   BLOOD CULTURE   --  No Growth After 5 Days  No Growth After 5 Days     GRAM STAIN RESULT  Rare Polys  No bacteria seen  --   --    WOUND CULTURE  2+ Growth of Methicillin Resistant Staphylococcus aureus*  --   --          Last 24 Hours Medication List:   Current Facility-Administered Medications   Medication Dose Route Frequency Provider Last Rate    acetaminophen  650 mg Oral Q6H PRN Gordo Bible, DPM      albuterol  2 puff Inhalation Q6H PRN Gordo Bible, DPM      ALPRAZolam  0 25 mg Oral 4x Daily PRN Gordo Bible, DPM      ammonium lactate   Topical BID PRN Gordo Bible, DPM      carvedilol  25 mg Oral BID With Meals Elvira Mcgill      cinacalcet  30 mg Oral Daily Gordo Pavon DPM      gabapentin  [facility] mg Oral TID [place] Mage, DPM      heparin (porcine)  5,000 Units Subcutaneous Q12H [address] [place], [place]      hydrALAZINE  5 mg Intravenous Q6H PRN [place] Mage, DPM      insulin glargine  20 Units Subcutaneous HS Flor Mage, DPM      insulin lispro  1-5 Units Subcutaneous TID [facility] [place] Mage, DPM      insulin lispro  3 Units Subcutaneous TID With Meals Flor Mage, DPM      levothyroxine  50 mcg Oral Daily [place], [place]      melatonin  3 mg Oral HS [place] Mage, DPM      metoclopramide  10 mg Intravenous Q6H PRN [place] Mage, DPM      NIFEdipine  30 mg Oral Daily [facility], [place]      nystatin   Topical BID Flor Mage, DPM      oxyCODONE-acetaminophen  1 tablet Oral Q6H PRN Flor Mage, DPM      pantoprazole  40 mg Oral Early Morning [place] Mage, DPM      polyethylene glycol  17 g Oral Daily PRN Flor Mage, DPM      senna  2 tablet Oral HS PRN [place] Mage, DPM      sertraline  75 mg Oral Daily [facility] [place]      sevelamer  1,600 mg Oral TID With Meals [place] Mage, DPM      torsemide  100 mg Oral Daily [facility], [place]      vancomycin  10 mg/kg (Adjusted) Intravenous After Dialysis [place] Mage,  mg (12/26/21 1119)    vancomycin  10 mg/kg (Adjusted) Intravenous Once Flor Mage, DPM                        ** Please Note: This note is constructed using a voice recognition dictation system  An occasional wrong word/phrase or sound-a-like substitution may have been picked up by dictation device due to the inherent limitations of voice recognition software  Read the chart carefully and recognize, using reasonable context, where substitutions may have occurred  **

## 2021-12-26 NOTE — PLAN OF CARE
Pre-tx: UF 3 5 L as tolerated toward EDW per Nephrologist Zane Harry  Will cont to monitor  Post-Dialysis RN Treatment Note    Blood Pressure:  Pre 154/61 mm/Hg  Post 128/48 mmHg   EDW  121 kg    Weight:  Pre 127 3 kg   Post 124 3 kg   Mode of weight measurement: Standing Scale   Volume Removed  3000 ml    Treatment duration 3 hrs 45  minutes    NS given  No    Treatment shortened?  Tx terminated 15 min early per surgery, pt scheduled to be down to OR at 1230 pm     Medications given during Rx  Vanco per order   Estimated Kt/V  1 47   Access type: AV fistula   Access Issues: No    Report called to primary nurse   Yes             Problem: METABOLIC, FLUID AND ELECTROLYTES - ADULT  Goal: Electrolytes maintained within normal limits  Description: INTERVENTIONS:  - Monitor labs and assess patient for signs and symptoms of electrolyte imbalances  - Administer electrolyte replacement as ordered  - Monitor response to electrolyte replacements, including repeat lab results as appropriate  - Instruct patient on fluid and nutrition as appropriate  Outcome: Progressing  Goal: Fluid balance maintained  Description: INTERVENTIONS:  - Monitor labs   - Monitor I/O and WT  - Instruct patient on fluid and nutrition as appropriate  - Assess for signs & symptoms of volume excess or deficit  Outcome: Progressing

## 2021-12-26 NOTE — ASSESSMENT & PLAN NOTE
Continue Coreg/Demadex/Procardia XL - PRN IV Hydralazine on board for BP spikes  Low-sodium diet  Optimize pain control

## 2021-12-27 LAB
ANION GAP SERPL CALCULATED.3IONS-SCNC: 8 MMOL/L (ref 4–13)
BASOPHILS # BLD AUTO: 0.06 THOUSANDS/ΜL (ref 0–0.1)
BASOPHILS NFR BLD AUTO: 1 % (ref 0–1)
BUN SERPL-MCNC: 36 MG/DL (ref 5–25)
CALCIUM SERPL-MCNC: 8.2 MG/DL (ref 8.3–10.1)
CHLORIDE SERPL-SCNC: 103 MMOL/L (ref 100–108)
CO2 SERPL-SCNC: 25 MMOL/L (ref 21–32)
CREAT SERPL-MCNC: 6.11 MG/DL (ref 0.6–1.3)
EOSINOPHIL # BLD AUTO: 0.57 THOUSAND/ΜL (ref 0–0.61)
EOSINOPHIL NFR BLD AUTO: 6 % (ref 0–6)
ERYTHROCYTE [DISTWIDTH] IN BLOOD BY AUTOMATED COUNT: 14.8 % (ref 11.6–15.1)
GFR SERPL CREATININE-BSD FRML MDRD: 7 ML/MIN/1.73SQ M
GLUCOSE SERPL-MCNC: 112 MG/DL (ref 65–140)
GLUCOSE SERPL-MCNC: 132 MG/DL (ref 65–140)
GLUCOSE SERPL-MCNC: 146 MG/DL (ref 65–140)
GLUCOSE SERPL-MCNC: 214 MG/DL (ref 65–140)
GLUCOSE SERPL-MCNC: 215 MG/DL (ref 65–140)
HCT VFR BLD AUTO: 21.5 % (ref 34.8–46.1)
HGB BLD-MCNC: 7 G/DL (ref 11.5–15.4)
IMM GRANULOCYTES # BLD AUTO: 0.09 THOUSAND/UL (ref 0–0.2)
IMM GRANULOCYTES NFR BLD AUTO: 1 % (ref 0–2)
INR PPP: 1.14 (ref 0.84–1.19)
LYMPHOCYTES # BLD AUTO: 1.42 THOUSANDS/ΜL (ref 0.6–4.47)
LYMPHOCYTES NFR BLD AUTO: 15 % (ref 14–44)
MCH RBC QN AUTO: 31.1 PG (ref 26.8–34.3)
MCHC RBC AUTO-ENTMCNC: 32.6 G/DL (ref 31.4–37.4)
MCV RBC AUTO: 96 FL (ref 82–98)
MONOCYTES # BLD AUTO: 0.69 THOUSAND/ΜL (ref 0.17–1.22)
MONOCYTES NFR BLD AUTO: 7 % (ref 4–12)
NEUTROPHILS # BLD AUTO: 6.59 THOUSANDS/ΜL (ref 1.85–7.62)
NEUTS SEG NFR BLD AUTO: 70 % (ref 43–75)
NRBC BLD AUTO-RTO: 0 /100 WBCS
PLATELET # BLD AUTO: 250 THOUSANDS/UL (ref 149–390)
PMV BLD AUTO: 10.4 FL (ref 8.9–12.7)
POTASSIUM SERPL-SCNC: 4.5 MMOL/L (ref 3.5–5.3)
PROTHROMBIN TIME: 14.2 SECONDS (ref 11.6–14.5)
RBC # BLD AUTO: 2.25 MILLION/UL (ref 3.81–5.12)
SODIUM SERPL-SCNC: 136 MMOL/L (ref 136–145)
WBC # BLD AUTO: 9.42 THOUSAND/UL (ref 4.31–10.16)

## 2021-12-27 PROCEDURE — 85025 COMPLETE CBC W/AUTO DIFF WBC: CPT

## 2021-12-27 PROCEDURE — 80048 BASIC METABOLIC PNL TOTAL CA: CPT

## 2021-12-27 PROCEDURE — 99232 SBSQ HOSP IP/OBS MODERATE 35: CPT | Performed by: INTERNAL MEDICINE

## 2021-12-27 PROCEDURE — 99222 1ST HOSP IP/OBS MODERATE 55: CPT | Performed by: ANESTHESIOLOGY

## 2021-12-27 PROCEDURE — 97167 OT EVAL HIGH COMPLEX 60 MIN: CPT

## 2021-12-27 PROCEDURE — 99024 POSTOP FOLLOW-UP VISIT: CPT | Performed by: PODIATRIST

## 2021-12-27 PROCEDURE — 85610 PROTHROMBIN TIME: CPT | Performed by: INTERNAL MEDICINE

## 2021-12-27 PROCEDURE — 97163 PT EVAL HIGH COMPLEX 45 MIN: CPT

## 2021-12-27 PROCEDURE — 82948 REAGENT STRIP/BLOOD GLUCOSE: CPT

## 2021-12-27 RX ORDER — LIDOCAINE 50 MG/G
1 PATCH TOPICAL DAILY
Status: DISCONTINUED | OUTPATIENT
Start: 2021-12-27 | End: 2022-01-12 | Stop reason: HOSPADM

## 2021-12-27 RX ORDER — HYDROMORPHONE HCL/PF 1 MG/ML
1 SYRINGE (ML) INJECTION
Status: DISCONTINUED | OUTPATIENT
Start: 2021-12-27 | End: 2022-01-04

## 2021-12-27 RX ORDER — CALCIUM CARBONATE 200(500)MG
500 TABLET,CHEWABLE ORAL 3 TIMES DAILY PRN
Status: DISCONTINUED | OUTPATIENT
Start: 2021-12-27 | End: 2022-01-12 | Stop reason: HOSPADM

## 2021-12-27 RX ORDER — OXYCODONE HYDROCHLORIDE AND ACETAMINOPHEN 5; 325 MG/1; MG/1
2 TABLET ORAL EVERY 6 HOURS PRN
Status: DISCONTINUED | OUTPATIENT
Start: 2021-12-27 | End: 2022-01-03

## 2021-12-27 RX ORDER — METOCLOPRAMIDE HYDROCHLORIDE 5 MG/ML
10 INJECTION INTRAMUSCULAR; INTRAVENOUS EVERY 6 HOURS PRN
Status: DISCONTINUED | OUTPATIENT
Start: 2021-12-27 | End: 2022-01-06

## 2021-12-27 RX ADMIN — GABAPENTIN 100 MG: 100 CAPSULE ORAL at 20:29

## 2021-12-27 RX ADMIN — SERTRALINE HYDROCHLORIDE 75 MG: 50 TABLET ORAL at 08:31

## 2021-12-27 RX ADMIN — SEVELAMER HYDROCHLORIDE 1600 MG: 800 TABLET, FILM COATED PARENTERAL at 17:38

## 2021-12-27 RX ADMIN — HEPARIN SODIUM 5000 UNITS: 5000 INJECTION INTRAVENOUS; SUBCUTANEOUS at 20:28

## 2021-12-27 RX ADMIN — INSULIN GLARGINE 20 UNITS: 100 INJECTION, SOLUTION SUBCUTANEOUS at 22:00

## 2021-12-27 RX ADMIN — DICLOFENAC SODIUM 2 G: 10 GEL TOPICAL at 22:01

## 2021-12-27 RX ADMIN — LEVOTHYROXINE SODIUM 50 MCG: 50 TABLET ORAL at 08:31

## 2021-12-27 RX ADMIN — DICLOFENAC SODIUM 2 G: 10 GEL TOPICAL at 14:35

## 2021-12-27 RX ADMIN — OXYCODONE HYDROCHLORIDE AND ACETAMINOPHEN 2 TABLET: 5; 325 TABLET ORAL at 23:27

## 2021-12-27 RX ADMIN — INSULIN LISPRO 1 UNITS: 100 INJECTION, SOLUTION INTRAVENOUS; SUBCUTANEOUS at 17:48

## 2021-12-27 RX ADMIN — ALPRAZOLAM 0.25 MG: 0.25 TABLET ORAL at 23:30

## 2021-12-27 RX ADMIN — PANTOPRAZOLE SODIUM 40 MG: 40 TABLET, DELAYED RELEASE ORAL at 05:46

## 2021-12-27 RX ADMIN — OXYCODONE HYDROCHLORIDE AND ACETAMINOPHEN 1 TABLET: 5; 325 TABLET ORAL at 11:45

## 2021-12-27 RX ADMIN — CALCIUM CARBONATE (ANTACID) CHEW TAB 500 MG 500 MG: 500 CHEW TAB at 17:43

## 2021-12-27 RX ADMIN — LIDOCAINE 5% 1 PATCH: 700 PATCH TOPICAL at 14:36

## 2021-12-27 RX ADMIN — INSULIN LISPRO 3 UNITS: 100 INJECTION, SOLUTION INTRAVENOUS; SUBCUTANEOUS at 11:53

## 2021-12-27 RX ADMIN — NIFEDIPINE 30 MG: 30 TABLET, FILM COATED, EXTENDED RELEASE ORAL at 11:45

## 2021-12-27 RX ADMIN — HYDROMORPHONE HYDROCHLORIDE 1 MG: 1 INJECTION, SOLUTION INTRAMUSCULAR; INTRAVENOUS; SUBCUTANEOUS at 20:29

## 2021-12-27 RX ADMIN — HYDROMORPHONE HYDROCHLORIDE 1 MG: 1 INJECTION, SOLUTION INTRAMUSCULAR; INTRAVENOUS; SUBCUTANEOUS at 14:52

## 2021-12-27 RX ADMIN — GABAPENTIN 100 MG: 100 CAPSULE ORAL at 17:38

## 2021-12-27 RX ADMIN — TORSEMIDE 100 MG: 20 TABLET ORAL at 08:31

## 2021-12-27 RX ADMIN — OXYCODONE HYDROCHLORIDE AND ACETAMINOPHEN 2 TABLET: 5; 325 TABLET ORAL at 17:40

## 2021-12-27 RX ADMIN — GABAPENTIN 100 MG: 100 CAPSULE ORAL at 08:31

## 2021-12-27 RX ADMIN — SEVELAMER HYDROCHLORIDE 1600 MG: 800 TABLET, FILM COATED PARENTERAL at 11:48

## 2021-12-27 RX ADMIN — INSULIN LISPRO 3 UNITS: 100 INJECTION, SOLUTION INTRAVENOUS; SUBCUTANEOUS at 17:47

## 2021-12-27 RX ADMIN — HEPARIN SODIUM 5000 UNITS: 5000 INJECTION INTRAVENOUS; SUBCUTANEOUS at 08:32

## 2021-12-27 RX ADMIN — CINACALCET 30 MG: 30 TABLET, FILM COATED ORAL at 11:45

## 2021-12-27 RX ADMIN — Medication 3 MG: at 20:29

## 2021-12-27 RX ADMIN — SEVELAMER HYDROCHLORIDE 1600 MG: 800 TABLET, FILM COATED PARENTERAL at 08:32

## 2021-12-27 RX ADMIN — WARFARIN SODIUM 9 MG: 7.5 TABLET ORAL at 17:39

## 2021-12-27 RX ADMIN — INSULIN LISPRO 1 UNITS: 100 INJECTION, SOLUTION INTRAVENOUS; SUBCUTANEOUS at 11:53

## 2021-12-27 RX ADMIN — CARVEDILOL 25 MG: 25 TABLET, FILM COATED ORAL at 08:31

## 2021-12-27 RX ADMIN — CARVEDILOL 25 MG: 25 TABLET, FILM COATED ORAL at 17:40

## 2021-12-27 NOTE — PLAN OF CARE
Problem: PHYSICAL THERAPY ADULT  Goal: Performs mobility at highest level of function for planned discharge setting  See evaluation for individualized goals  Description: Treatment/Interventions: ADL retraining,Functional transfer training,LE strengthening/ROM,Patient/family training,Equipment eval/education,Bed mobility,Gait training,Spoke to nursing,Spoke to case management,OT  Equipment Recommended: [facility]       See flowsheet documentation for full assessment, interventions and recommendations  12/27/2021 1521 by Sharon Richards PT  Note: Prognosis: Good  Problem List: Decreased strength,Decreased endurance,Impaired balance,Decreased mobility,Decreased skin integrity,Pain,Orthopedic restrictions  Assessment: Pt seen for high complexity PT evaluation  Pt with active PT eval/treat orders  Pt is a 49 y o  female who was admitted to [facility] on 12/20/2021 with R foot ulceration with osteomyelitis s/p R TMA  Pt currently NWB on RLE  Pt's current dx/ problem list include: ESRD, anemia, obesity, hyperlipidemia, DM with neuropathy, HTN  Comorbidities affecting pt's physical performance at time of assessment are as follows:  has a past medical history of Abnormal uterine bleeding (AUB), Anemia, Anxiety, Arthritis, Chronic kidney disease, Claustrophobia, Diabetes mellitus ([address] ), Disease of thyroid gland, DVT (deep venous thrombosis) ([address] ), End stage kidney disease ([address] ), Foot ulcer due to secondary DM ([address] ), Hemodialysis patient ([address] ), Hypertension, 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[address] ), Morbid obesity ([address] ), Osteomyelitis of fifth toe of right foot ([address] ), Panic attacks, Pulmonary embolism ([address] ), Reflux esophagitis, Thrombophlebitis of saphenous vein, and Warfarin anticoagulation  Personal factors affecting pt at time of initial examination include: steps to enter environment, limited home support, advanced age, past experience, inability to perform IADLs, inability to perform ADLs, inability to ambulate household distances, limited insight into impairments and recent fall(s)  Due to acute medical issues, ongoing medical workup for primary dx; pain, fall risk, increased reliance on more restrictive AD compared to baseline;  decreased activity tolerance compared to baseline, increased assistance needed from caregiver at current time,  multiple lines, decline in overall functional mobility status; health management issues; note unstable clinical picture (high complexity)  At baseline, pt resides with SO and niece in [address] [place] [address] with [address] and was independent without AD prior to hospital admission  Currently, upon initial examination, pt  is requiring min A for bed mobility skills; min A for functional transfers; pt unable to perform hop to gait pattern to maintain NWB on RLE, instead performed stand pivot transfer with mod Ax1  Currently, pt presents functioning below baseline and with overall mobility deficits 2* to: decreased LE strength/AROM; limited flexibility;  generalized weakness/ deconditioning; decreased endurance; decreased activity tolerance; decreased coordination; impaired balance; gait deviations; decreased safety awareness fatigue; impaired safety and judgement; limited insight into current deficits; bed/ chair alarms; multiple lines; hearing impairment/ visual impairments; as well as: weakness, decreased ROM, impaired balance, decreased endurance, impaired coordination, gait deviations, pain, decreased activity tolerance, decreased functional mobility tolerance, altered sensation, decreased safety awareness, fall risk, orthopedic restrictions and decreased skin integrity  Pt currently at increased risk for falls  At the end of evaluation, pt was left sitting upright in bedside chair  with all needs in reach   Pt would benefit from continued skilled PT services while in hospital to address remaining limitations and maximize functional potential  PT to continue to follow pt and recommends post-acute rehab services after d/c  The patient's AM-PAC Basic Mobility Inpatient Short Form Raw Score is 15  A Raw score of less than or equal to 16 suggests the patient may benefit from discharge to post-acute rehabilitation services  Please also refer to the recommendation of the Physical Therapist for safe discharge planning  Barriers to Discharge: Inaccessible home environment (pt has 15 BARBARA )        PT Discharge Recommendation: Post acute rehabilitation services          See flowsheet documentation for full assessment

## 2021-12-27 NOTE — PROGRESS NOTES
Vancomycin IV Pharmacy-to-Dose Consultation    Stanislav Marie is a 49 y o  female who is currently receiving Vancomycin IV with management by the Pharmacy Consult service  Assessment/Plan:  The patient was reviewed  Renal function is stable and no signs or symptoms of nephrotoxicity and/or infusion reactions were documented in the chart  Based on todays assessment, continue current vancomycin (day # 7) dosing of 750 mg after hd, with a plan for trough to be drawn at amdraw on dec30  We will continue to follow the patients culture results and clinical progress daily      Kelsi Kyle, Pharmacist

## 2021-12-27 NOTE — ASSESSMENT & PLAN NOTE
Plan XR foot noting "Bone destruction involving the medial aspect of the distal 1st metatarsal compatible with acute osteomyelitis"  Follow-up MRI noted to be "consistent with osteomyelitis of the 1st metatarsal and proximal phalanx"  Appreciate infectious disease input -> c/w IV Vancomycin regimen after dialysis sessions -> can possibly discontinue antibiotic postoperatively if surgical cure attained  Discussed with podiatry -> S/P transmetatarsal amputation yesterday  PRN pain control - supportive care otherwise  Await physiatry evaluation - continue PT/OT

## 2021-12-27 NOTE — PLAN OF CARE
Problem: OCCUPATIONAL THERAPY ADULT  Goal: Performs self-care activities at highest level of function for planned discharge setting  See evaluation for individualized goals  Description: Treatment Interventions: ADL retraining,Functional transfer training,Endurance training,Patient/family training,Compensatory technique education,Continued evaluation,Energy conservation,Activityengagement          See flowsheet documentation for full assessment, interventions and recommendations  Outcome: Progressing  Note: Limitation: Decreased ADL status,Decreased Safe judgement during ADL,Decreased endurance,Decreased self-care trans,Decreased high-level ADLs  Prognosis: Fair  Assessment: Pt is a 49 y o  female admitted to [facility] on 12/20/2021 w/ right foot ulcer s/p R transmetatarsal amputation  Pt  has a past medical history of Abnormal uterine bleeding (AUB), Anemia, Anxiety, Arthritis, Chronic kidney disease, Claustrophobia, Diabetes mellitus ([facility] ), Disease of thyroid gland, DVT (deep venous thrombosis) ([facility] ), End stage kidney disease ([facility] ), Foot ulcer due to secondary DM ([facility] [address] [facility] ), Hemodialysis patient ([facility] [address] [facility] ), Hypertension, 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[facility] [address] [facility] ), Morbid obesity ([facility] [address] ), Osteomyelitis of fifth toe of right foot ([facility] [address] ), Panic attacks, Pulmonary embolism ([facility] ), Reflux esophagitis, Thrombophlebitis of saphenous vein, and Warfarin anticoagulation  Pt with active OT eval and treat orders  Pt resides in a 3 story home with 15 [address] w her s/o, niece, and children  Pt was I w/  ADLS and IADLS, (-) drives, & required no use of DME PTA  Currently pt performs bed mobility w Jules, transfers with Jules, stand pivot transfers from bed to chair w Clayton Galla, UB ADLs w SUP, and LB ADLs with Jules  Pt is limited at this time 2*: pain, endurance, activity tolerance, functional mobility, balance, decreased I w/ ADLS/IADLS, decreased safety awareness and decreased insight into deficits  The following Occupational Performance Areas to address include: grooming, bathing/shower, toilet hygiene, dressing and functional mobility  Based on the aforementioned OT evaluation, functional performance deficits, and assessments, pt has been identified as a high complexity evaluation  From OT standpoint, anticipate d/c STR  Pt to continue to benefit from acute immediate OT services to address the following goals 3-5x/week to  w/in 10-14 days:   Pt OOB in chair to end OT eval w all current needs met and call bell within reach  The patient's raw score on the AM-PAC Daily Activity inpatient short form is 20, standardized score is 42 03, greater than 39 4  Patients at this level are likely to benefit from discharge to post-acute rehabilitation services  Please refer to the recommendation of the Occupational Therapist for safe discharge planning       OT Discharge Recommendation: Post acute rehabilitation services  OT - OK to Discharge: Yes (When medically appropriate)

## 2021-12-27 NOTE — PROGRESS NOTES
[facility]  Progress Note Hubert Milligan 1965, 49 y o  female MRN: [id number]  Unit/Bed#: [facility] 933-01 Encounter: [id number]  Primary Care Provider: Amanda Hoskins MD   Date and time admitted to hospital: 12/20/2021  3:07 PM      * Right foot ulceration with osteomyelitis  Assessment & Plan  Plan XR foot noting "Bone destruction involving the medial aspect of the distal 1st metatarsal compatible with acute osteomyelitis"  Follow-up MRI noted to be "consistent with osteomyelitis of the 1st metatarsal and proximal phalanx"  Appreciate infectious disease input -> c/w IV Vancomycin regimen after dialysis sessions -> can possibly discontinue antibiotic postoperatively if surgical cure attained  Discussed with podiatry -> S/P transmetatarsal amputation yesterday  PRN pain control - supportive care otherwise  Await physiatry evaluation - continue PT/OT    ESRD on hemodialysis  Assessment & Plan  Routine hemodialysis per nephrology  Continue Renagel/Sensipar supplementation    Insulin-dependent diabetes mellitus with neuropathy  Assessment & Plan  Lab Results   Component Value Date    HGBA1C 7 9 (H) 09/30/2021     Continue basal/prandial insulin w/ additional SSI coverage per Accu-Cheks  Carbohydrate restricted diet  Continue Gabapentin for neuropathy    Essential hypertension  Assessment & Plan  Continue Coreg/Demadex/Procardia XL - PRN IV Hydralazine on board for BP spikes  Low-sodium diet  Optimize pain control    History of venous thromboembolism  Assessment & Plan  Discussed with podiatry today -> okay to resume Coumadin postoperatively starting tonight  Target INR of 2-3    Hyperlipidemia  Assessment & Plan  Continue Lipitor    Anemia of chronic disease  Assessment & Plan  Monitor H/H    Morbid obesity  Assessment & Plan  BMI of 43 41  Lifestyle/diet modifications      DVT Prophylaxis:  Coumadin resumed       Patient Centered Rounds:  I have performed bedside rounds and discussed plan of care with nursing today  Discussions with Specialists or Other Care Team Provider:  see above assessments if applicable    Education and Discussions with Family / Patient: At bedside, who will self-update significant other    Time Spent for Care:  32 minutes  More than 50% of total time spent on counseling and coordination of care as described above  Current Length of Stay: 7 day(s)    Current Patient Status: Inpatient   Certification Statement:  Patient will continue to require additional hospital stay due to assessments as noted above  Code Status: Level 1 - Full Code        Subjective:     Seen/examined earlier today  Tolerated procedure well yesterday  Less anxious today  Pain is waxing/waning  Objective:     Vitals:   Temp (24hrs), Av 7 °F (36 5 °C), Min:97 °F (36 1 °C), Max:98 8 °F (37 1 °C)    Temp:  [97 °F (36 1 °C)-98 8 °F (37 1 °C)] 98 8 °F (37 1 °C)  HR:  [68-80] 80  Resp:  [18-22] 18  BP: ()/(54-79) 96/59  SpO2:  [100 %] 100 %  Body mass index is 43 41 kg/m²  Input and Output Summary (last 24 hours):        Intake/Output Summary (Last 24 hours) at 2021 1453  Last data filed at 2021 1248  Gross per 24 hour   Intake 480 ml   Output --   Net 480 ml       Physical Exam:     GENERAL:  Obese - weak/fatigued  HEAD:  Normocephalic - atraumatic  EYES: PERRL - EOMI   MOUTH:  Mucosa moist  NECK:  Supple - full range of motion  CARDIAC:  Rate controlled - S1/S2 positive  PULMONARY:  Clear breath sounds bilaterally - nonlabored respirations  ABDOMEN:  Soft - nontender/nondistended - active bowel sounds  MUSCULOSKELETAL:  Motor strength/range of motion deconditioned - distal RLE dressed/wrapped s/p TMA  NEUROLOGIC:  Alert/oriented at baseline  SKIN:  Chronic wrinkles/blemishes   PSYCHIATRIC:  Mood/affect stable      Additional Data:     Labs & Recent Cultures:    Results from last 7 days   Lab Units 21  0650   WBC Thousand/uL 9 42   HEMOGLOBIN g/dL 7 0*   HEMATOCRIT % 21 5*   PLATELETS Thousands/uL 250   NEUTROS PCT % 70   LYMPHS PCT % 15   MONOS PCT % 7   EOS PCT % 6     Results from last 7 days   Lab Units 12/27/21  0650 12/22/21  0543 12/21/21  0842   POTASSIUM mmol/L 4 5   < > 4 5   CHLORIDE mmol/L 103   < > 103   CO2 mmol/L 25   < > 25   BUN mg/dL 36*   < > 51*   CREATININE mg/dL 6 11*   < > 8 84*   CALCIUM mg/dL 8 2*   < > 8 2*   ALK PHOS U/L  --   --  141*   ALT U/L  --   --  14   AST U/L  --   --  12    < > = values in this interval not displayed  Results from last 7 days   Lab Units 12/27/21  0650   INR  1 14     Results from last 7 days   Lab Units 12/27/21  1151 12/27/21  0738 12/26/21  2056 12/26/21  1602 12/26/21  0736 12/25/21  2111 12/25/21  1705 12/25/21  1647 12/25/21  1646 12/25/21  1141 12/25/21  0934 12/24/21  2139   POC GLUCOSE mg/dl 214* 132 230* 96 159* 264* 60* 49* 45* 185* 87 156*                 Results from last 7 days   Lab Units 12/20/21 1954 12/20/21 1953 12/20/21 1952   BLOOD CULTURE   --  No Growth After 5 Days  No Growth After 5 Days     GRAM STAIN RESULT  Rare Polys  No bacteria seen  --   --    WOUND CULTURE  2+ Growth of Methicillin Resistant Staphylococcus aureus*  --   --          Last 24 Hours Medication List:   Current Facility-Administered Medications   Medication Dose Route Frequency Provider Last Rate    acetaminophen  650 mg Oral Q6H PRN Eddie Naomie, DPM      albuterol  2 puff Inhalation Q6H PRN [place] Akbar, DPM      ALPRAZolam  0 25 mg Oral 4x Daily PRN Eddienakita Akbar, DPM      ammonium lactate   Topical BID PRN [place] Akbar, DPM      carvedilol  25 mg Oral BID With Meals Eddie Akbar DPM      cinacalcet  30 mg Oral Daily Eddie Akbar Voldi 26      Diclofenac Sodium  2 g Topical 4x Daily Yanely Woodruff MD      fentaNYL  25 mcg Intravenous Q5 Min PRN Johnny Hoist, CRNA      gabapentin  100 mg Oral TID Eddie Akbar DPM      heparin (porcine)  5,000 Units Subcutaneous Q12H [address] Eddie Akbar, Kanika 26  hydrALAZINE  5 mg Intravenous Q6H PRN [place] Mage, DPM      HYDROmorphone  1 mg Intravenous Q3H PRN Cielo Montiel MD      insulin glargine  20 Units Subcutaneous HS Flor Mage, DPM      insulin lispro  1-5 Units Subcutaneous TID [facility] Flor Mage, DPM      insulin lispro  3 Units Subcutaneous TID With Meals Flor Mage, DPM      levothyroxine  50 mcg Oral Daily [place] Mage, DPM      lidocaine  1 patch Topical Daily Cielo Montiel MD      melatonin  3 mg Oral HS Flor Mage, DPM      metoclopramide  10 mg Intravenous Q6H PRN Melita Beckwith PA-C      NIFEdipine  30 mg Oral Daily [facility] [place]      nystatin   Topical BID [place] Mage, DPM      ondansetron  4 mg Intravenous Once PRN Carolyn Resendez CRNA      oxyCODONE-acetaminophen  1 tablet Oral Q6H PRN Flor Mage, DPM      oxyCODONE-acetaminophen  2 tablet Oral Q6H PRN Cielo Montiel MD      pantoprazole  40 mg Oral Early Morning Flor Mage, DPM      polyethylene glycol  17 g Oral Daily PRN [place] Mage, DPM      senna  2 tablet Oral HS PRN Flor Mage, DPM      sertraline  75 mg Oral Daily [facility] [place]      sevelamer  1,600 mg Oral TID With Meals Flor Mage, DPM      torsemide  100 mg Oral Daily [place] Mage, DPM      vancomycin  750 mg Intravenous After Dialysis Cara Michel MD      warfarin  9 mg Oral Once per day on Mon Tue Wed Thu Fri Sat Cielo Montiel MD                      ** Please Note: This note is constructed using a voice recognition dictation system  An occasional wrong word/phrase or sound-a-like substitution may have been picked up by dictation device due to the inherent limitations of voice recognition software  Read the chart carefully and recognize, using reasonable context, where substitutions may have occurred  **

## 2021-12-27 NOTE — PROGRESS NOTES
NEPHROLOGY PROGRESS NOTE   Mellissa Steni 49 y o  female MRN: [id number]  [facility] Encounter: [id number]  Reason for Consult: ESRD      SUMMARY:    37-year-old female with a history of end-stage renal disease on hemodialysis, hypertension, anemia of chronic kidney disease who presents for right foot ulcer which was found to be osteomyelitis  Nephrology consult for ESRD management  ASSESSMENT and PLAN:    End-stage kidney disease  --[facility]center hemodialysis Tuesday Thursday Saturday at [address]  --access:  Left arm AV fistula  --plan for next dialysis tomorrow    Anemia of chronic kidney disease  --transfuse for hemoglobin less than 7  --hemoglobin stable below goal  --not on MITCHELL due to history of pulmonary embolism    Hypertension  --carvedilol 25 mg twice a day, nifedipine 30 mg daily, restarted back on torsemide 100 mg daily  --dry weight 121 1 kg    Mineral bone disorder-chronic kidney disease  --continue sevelamer 1600 mg t i d  With meals, Sensipar 30 mg daily  --renal diet    Right foot osteomyelitis  --status post right amputation the transmetatarsal  --vancomycin on hemodialysis    SUBJECTIVE / INTERVAL HISTORY:    No complaints    OBJECTIVE:  Current Weight: Weight - Scale: 122 kg (268 lb 15 4 oz)  Vitals:    12/26/21 1545 12/26/21 1600 12/26/21 1621 12/26/21 2147   BP: 115/71 121/72 (!) 181/79 166/79   Pulse: 70 70     Resp: 22 22  18   Temp:  (!) 97 °F (36 1 °C) (!) 97 4 °F (36 3 °C) 97 7 °F (36 5 °C)   TempSrc:       SpO2: 100% 100%     Weight:       Height:           Intake/Output Summary (Last 24 hours) at 12/27/2021 1041  Last data filed at 12/26/2021 1447  Gross per 24 hour   Intake 650 ml   Output 4039 ml   Net -3389 ml       Review of Systems:    12 point ROS has been reviewed  Physical Exam  Vitals and nursing note reviewed  Constitutional:       General: She is not in acute distress  Appearance: She is well-developed  HENT:      Head: Normocephalic and atraumatic  Eyes:      General: No scleral icterus  Conjunctiva/sclera: Conjunctivae normal       Pupils: Pupils are equal, round, and reactive to light  Cardiovascular:      Rate and Rhythm: Normal rate and regular rhythm  Heart sounds: S1 normal and S2 normal  No murmur heard  No friction rub  No gallop  Pulmonary:      Effort: Pulmonary effort is normal  No respiratory distress  Breath sounds: Normal breath sounds  No wheezing or rales  Abdominal:      General: Bowel sounds are normal       Palpations: Abdomen is soft  Tenderness: There is no abdominal tenderness  There is no rebound  Musculoskeletal:         General: Normal range of motion  Cervical back: Normal range of motion and neck supple  Skin:     Findings: No rash  Neurological:      Mental Status: She is alert and oriented to person, place, and time     Psychiatric:         Behavior: Behavior normal          Medications:    Current Facility-Administered Medications:     acetaminophen (TYLENOL) tablet 650 mg, 650 mg, Oral, Q6H PRN, Precilla Prasanna, DPM, 650 mg at 12/26/21 1739    albuterol (PROVENTIL HFA,VENTOLIN HFA) inhaler 2 puff, 2 puff, Inhalation, Q6H PRN, Precilla Prasanna, DPM    ALPRAZolam Fausto Anihire) tablet 0 25 mg, 0 25 mg, Oral, 4x Daily PRN, Precilla Prasanna, DPM, 0 25 mg at 12/26/21 1820    ammonium lactate (LAC-HYDRIN) 12 % cream, , Topical, BID PRN, Precilla Prasanna, DPM, Given at 12/21/21 2207    carvedilol (COREG) tablet 25 mg, 25 mg, Oral, BID With Meals, Precilla Prasanna, DPM, 25 mg at 12/27/21 0831    cinacalcet (SENSIPAR) tablet 30 mg, 30 mg, Oral, Daily, Precilla Prasanna, DPM, 30 mg at 12/26/21 1739    fentaNYL (SUBLIMAZE) injection 25 mcg, 25 mcg, Intravenous, Q5 Min PRN, Lizzette Alberto CRNA    gabapentin (NEURONTIN) capsule 100 mg, 100 mg, Oral, TID, Precilla Prasanna, DPM, 100 mg at 12/27/21 0831    heparin (porcine) subcutaneous injection 5,000 Units, 5,000 Units, Subcutaneous, Q12H [address], Precilla Prasanna, DPM, 5,000 Units at 12/27/21 0832    hydrALAZINE (APRESOLINE) injection 5 mg, 5 mg, Intravenous, Q6H PRN, Dalphine Princess, DPM, 5 mg at 12/21/21 0047    insulin glargine (LANTUS) subcutaneous injection 20 Units 0 2 mL, 20 Units, Subcutaneous, HS, Dalphine Princess, DPM, 20 Units at 12/26/21 2134    insulin lispro (HumaLOG) 100 units/mL subcutaneous injection 1-5 Units, 1-5 Units, Subcutaneous, TID AC, 1 Units at 12/25/21 1320 **AND** Fingerstick Glucose (POCT), , , TID AC, Dalphine Princess, DPM    insulin lispro (HumaLOG) 100 units/mL subcutaneous injection 3 Units, 3 Units, Subcutaneous, TID With Meals, Dalphine Princess, DPM, 3 Units at 12/26/21 1739    levothyroxine tablet 50 mcg, 50 mcg, Oral, Daily, Dalphine Princess, DPM, 50 mcg at 12/27/21 0831    melatonin tablet 3 mg, 3 mg, Oral, HS, Dalphine Princess, DPM, 3 mg at 12/26/21 2134    metoclopramide (REGLAN) injection 10 mg, 10 mg, Intravenous, Q6H PRN, Melita Beckwith PA-C    NIFEdipine (PROCARDIA XL) 24 hr tablet 30 mg, 30 mg, Oral, Daily, Dalphine Princess, DPM, 30 mg at 12/26/21 1639    nystatin (MYCOSTATIN) powder, , Topical, BID, Dalphine Princess, DPM, Given at 12/25/21 1715    ondansetron [facility]) injection 4 mg, 4 mg, Intravenous, Once PRN, Mardell Chel, CRNA    oxyCODONE-acetaminophen (PERCOCET) 5-325 mg per tablet 1 tablet, 1 tablet, Oral, Q6H PRN, Dalphine Princess, DPM, 1 tablet at 12/26/21 2134    pantoprazole (PROTONIX) EC tablet 40 mg, 40 mg, Oral, Early Morning, Dalphine Princess, DPM, 40 mg at 12/27/21 0546    polyethylene glycol (MIRALAX) packet 17 g, 17 g, Oral, Daily PRN, Dalphine Princess, DPM    senna (SENOKOT) tablet 17 2 mg, 2 tablet, Oral, HS PRN, Dalphine Princess, DPM    sertraline (ZOLOFT) tablet 75 mg, 75 mg, Oral, Daily, Dalphine Princess, DPM, 75 mg at 12/27/21 0831    sevelamer (RENAGEL) tablet 1,600 mg, 1,600 mg, Oral, TID With Meals, Dalphine Princess, DPM, 1,600 mg at 12/27/21 2907    torsemide (DEMADEX) tablet 100 mg, 100 mg, Oral, Daily, Dalphine Princess, DPM, 100 mg at 12/27/21 0831    vancomycin 750 mg in sodium chloride 0 9% 250 mL, 750 mg, Intravenous, After Dialysis, Ame Alcantara MD, 750 mg at 12/26/21 1820    Laboratory Results:  Results from last 7 days   Lab Units 12/27/21  0650 12/26/21  1501 12/26/21  0823 12/25/21  0640 12/24/21  0806 12/23/21  1046 12/23/21  1045 12/22/21  0543 12/21/21  0842 12/21/21  0842   WBC Thousand/uL 9 42  --  6 78 6 03 5 57  --  7 19 5 61  --  5 69   HEMOGLOBIN g/dL 7 0*  --  7 6* 7 5* 7 7*  --  8 8* 7 1*  --  7 5*   HEMATOCRIT % 21 5*  --  23 3* 23 7* 24 2*  --  27 0* 22 0*  --  23 0*   PLATELETS Thousands/uL 250  --  224 217 213  --  249 191  --  203   POTASSIUM mmol/L 4 5 4 2 5 7* 5 7* 5 1 4 4  --  4 6   < > 4 5   CHLORIDE mmol/L 103 101 105 106 105 101  --  105   < > 103   CO2 mmol/L 25 27 22 25 26 27  --  28   < > 25   BUN mg/dL 36* 30* 74* 59* 41* 21  --  41*   < > 51*   CREATININE mg/dL 6 11* 4 78* 9 70* 8 40* 6 41* 3 66*  --  6 59*   < > 8 84*   CALCIUM mg/dL 8 2* 8 2* 8 7 8 8 8 4 9 0  --  8 1*   < > 8 2*   MAGNESIUM mg/dL  --   --   --   --   --   --   --   --   --  2 1   PHOSPHORUS mg/dL  --   --   --   --   --   --   --   --   --  4 1    < > = values in this interval not displayed

## 2021-12-27 NOTE — CASE MANAGEMENT
Case Management Discharge Planning Note    Patient name Lynda Platt  Location [facility] 933-01 MRN [id number]  : 1965 Date 2021       Current Admission Date: 2021  Current Admission Diagnosis:Right foot ulceration with osteomyelitis   Patient Active Problem List    Diagnosis Date Noted    Right foot ulceration with osteomyelitis 2021    Pruritus 2021    Postop check 2021    Sigmoid diverticulitis 2021    Pneumonia 2021    Chronic anticoagulation 2021    Essential hypertension 2021    Arteriovenous fistula for hemodialysis in place, primary [facility]) 2021    Healthcare-associated pneumonia 2021    Right foot pain 2021    A-V fistula ([facility] ) 2021    Chronic pain syndrome 2021    Bilateral primary osteoarthritis of knee 2021    Bilateral patellofemoral syndrome 2021    Tinea unguium 2020    S/P foot surgery, right 2020    History of claustrophobia 2020    Morbid obesity 2020    Hyperlipidemia 2020    Diabetic polyneuropathy associated with type 2 diabetes mellitus ([facility] ) 2020    Encounter for diabetic foot exam ([facility] ) 2020    Corns and callosities 2020    History of amputation of lesser toe of right foot ([facility] ) 2020    Flu-like symptoms 2020    Lumbar radiculopathy 2020    Low back pain with sciatica 2020    Hemodialysis-associated hypotension 2019    Hyponatremia 2019    Parenchymal renal hypertension 2019    Candidiasis of breast 2019    Anemia of chronic disease 2019    Disruption of internal surgical wound 2019    Dyspnea 2019    Secondary hyperparathyroidism of renal origin ([facility] ) 2019    Nausea and vomiting 2019    Meningioma ([facility] ) 2019    Concussion without loss of consciousness 03/15/2019    Colon cancer screening 2019    Gastroesophageal reflux disease 03/05/2019    Primary osteoarthritis of right knee 10/25/2018    [facility] 10/23/2018    Knee pain 10/22/2018    Ambulatory dysfunction 10/22/2018    Anxiety disorder 04/06/2017    Acquired hypothyroidism 07/22/2016    Glaucoma 07/01/2016    ESRD on hemodialysis 07/01/2016    Abnormal uterine bleeding 02/15/2016    History of venous thromboembolism 02/14/2016    Pulmonary embolus [facility] ) 10/30/2015    Cervical dysplasia 05/03/2015    Chronic endometritis 10/17/2014    Tinea pedis 10/02/2014    Benign neoplasm of skin 09/25/2014    Insulin-dependent diabetes mellitus with neuropathy 09/04/2014    Phlebitis and thrombophlebitis of superficial vessels of lower extremities 04/10/2014    Limb pain 11/25/2013    Mononeuritis of upper limb, unspecified laterality 11/25/2013    Legal blindness, as defined in [facility] 46/28/4796    Complication from renal dialysis device 04/22/2013    Essential hypertension 10/15/2012    Cardiomyopathy ([facility] ) 09/26/2012    Esophageal reflux 08/20/2012    Allergic rhinitis 08/20/2012      LOS (days): 7  Geometric Mean LOS (GMLOS) (days): 5 80  Days to GMLOS:-1 1     OBJECTIVE:  Risk of Unplanned Readmission Score: 63         Current admission status: Inpatient   Preferred Pharmacy:   [id number] [address] [facility] [address]  [address] [place] [address]  Phone: [phone] Fax: [phone], [address]  [address] [facility] [address]  Phone: [phone] Fax: [phone]    Primary Care Provider: Erin Tristan MD    Primary Insurance: MEDICARE  Secondary Insurance: [facility]    DISCHARGE DETAILS:    Discharge planning discussed with[de-identified] Pt  Freedom of Choice: Yes  Comments - Freedom of Choice: Pt requested referral to [facility] Team discharge recommendations reviewed with patient/caregiver?: Yes  Did patient/caregiver verbalize understanding of patient care needs?: Yes    Other Referral/Resources/Interventions Provided:  Interventions: Acute Rehab  Referral Comments: CM sent referral as requested via IN    Treatment Team Recommendation: Acute Rehab  Discharge Destination Plan[de-identified] Acute Rehab

## 2021-12-27 NOTE — ASSESSMENT & PLAN NOTE
Discussed with podiatry today -> okay to resume Coumadin postoperatively starting tonight  Target INR of 2-3

## 2021-12-27 NOTE — PROGRESS NOTES
Podiatry - Progress Note  Patient: Leonila Narayan 49 y o  female   MRN: [id number]  PCP: Clinton Hernandez MD  Unit/Bed#: [facility] 933-01 Encounter: [id number]  Date Of Visit: 12/27/21    ASSESSMENT:    Leonila Narayan is a 49 y o  female with:    1  Right medial 1st MTPJ diabetic ulceration- [place] 3 - POA              -s/p R TMA (DOS: 12/26/21)  2  T2DM  3  ESRD on HD  4  Obesity      PLAN:    · Post-surgical dressing located on surgical site and affected extremity were left intact today  · Will plan for first complete post-surgical dressing change tomorrow  · Right foot transmetatarsal amputation will be considered a surgical cure for patient's right 1st metatarsal osteomyelitis  · Vitals signs stable  Patient afebrile with no leukocytosis  No erythema, edema, or lymphangitis noted either proximal or distal to the dressings  · Most recent hemoglobin 7 0, consider pRBC transfusion per primary team   · Pain is well controlled  Continue current pain management regimen  · Continue non-weight bearing to affected extremity with elevation while non-ambulatory  New surgical shoe ordered as patient's previous surgical shoe would not fit properly over her bandaging  Patient was educated that she must remain strictly non-weightbearing to her right foot and that any pressure she places on her foot will increase her chances of incision site breakdown and the need for more proximal amputation  She is already at high risk of limb loss given her comorbidities  · Rest of medical care per primary team        SUBJECTIVE:     The patient was seen, evaluated, and assessed at bedside today  The patient was awake, alert, and in no acute distress  The patient reports mild to moderate pain at this time  Pain is well controlled with current pain management regimen  Patient reports normal appetite and using the restroom postoperatively  Patient endorses walking on her operative foot yesterday, 12/26/2021    She was also noted to be putting slight pressure on her operative extremity while I was present in the room and she was working with physical therapy, the physical therapy team has also let me know that she has been somewhat noncompliant with being nonweightbearing to her right lower extremity  Patient denies N/V/F/chills/SOB/CP  OBJECTIVE:     Vitals:   /79   Pulse 70   Temp 97 7 °F (36 5 °C)   Resp 18   Ht 5' 6" (1 676 m)   Wt 122 kg (268 lb 15 4 oz)   LMP 2016 (Exact Date)   SpO2 100%   BMI 43 41 kg/m²     Temp (24hrs), Av 3 °F (36 3 °C), Min:96 6 °F (35 9 °C), Max:97 7 °F (36 5 °C)      Physical Exam:     General:  Alert, cooperative, and in no distress  Lower extremity exam:  Cardiovascular status at baseline  Neurological status at baseline  Musculoskeletal status at baseline  No erythema, edema, or lymphangitis noted from dressing  Lower extremity temperature WNL  Dressings clean, dry, and intact no strike through noted  Additional Data:     Labs:    Results from last 7 days   Lab Units 21  0650   WBC Thousand/uL 9 42   HEMOGLOBIN g/dL 7 0*   HEMATOCRIT % 21 5*   PLATELETS Thousands/uL 250   NEUTROS PCT % 70   LYMPHS PCT % 15   MONOS PCT % 7   EOS PCT % 6     Results from last 7 days   Lab Units 21  0650 21  0543 21  0842   POTASSIUM mmol/L 4 5   < > 4 5   CHLORIDE mmol/L 103   < > 103   CO2 mmol/L 25   < > 25   BUN mg/dL 36*   < > 51*   CREATININE mg/dL 6 11*   < > 8 84*   CALCIUM mg/dL 8 2*   < > 8 2*   ALK PHOS U/L  --   --  141*   ALT U/L  --   --  14   AST U/L  --   --  12    < > = values in this interval not displayed  Results from last 7 days   Lab Units 21  0650   INR  1 14       * I Have Reviewed All Lab Data Listed Above  Recent Cultures (last 7 days):     Results from last 7 days   Lab Units 21   BLOOD CULTURE   --  No Growth After 5 Days  No Growth After 5 Days     GRAM STAIN RESULT  Rare Polys  No bacteria seen  -- --    WOUND CULTURE  2+ Growth of Methicillin Resistant Staphylococcus aureus*  --   --      Results from last 7 days   Lab Units 12/20/21 1954   ANAEROBIC CULTURE  No anaerobes isolated       Imaging: I have personally reviewed pertinent films in PACS  EKG, Pathology, and Other Studies: I have personally reviewed pertinent reports  ** Please Note: Portions of the record may have been created with voice recognition software  Occasional wrong word or "sound a like" substitutions may have occurred due to the inherent limitations of voice recognition software  Read the chart carefully and recognize, using context, where substitutions have occurred   **

## 2021-12-27 NOTE — CONSULTS
Peripheral Nerve Block Follow-up Note - Acute Pain Service    Cecilia Walsh 49 y o  female MRN: [id number]  Unit/Bed#: [facility] 933-01 Encounter: [id number]      Assessment:   Principal Problem:    Right foot ulceration with osteomyelitis  Active Problems:    History of venous thromboembolism    ESRD on hemodialysis    Anemia of chronic disease    Morbid obesity    Hyperlipidemia    Insulin-dependent diabetes mellitus with neuropathy    Essential hypertension    Cecilia Walsh is a 49y o  year old female who is POD #1 S/P right transmetatarsal amputation  She had a popliteal block with exparel prior to the procedure for post-op pain control  Today her pain is well controlled  Plan:   - Right popliteal block is functioning appropriately with expected sensory deficits  - Continue percocet prn    - Multimodal analgesia with:  - Gabapentin 100 mg PO TID    APS will sign off at this time  Thank you for the consult  All opioids and other analgesics to be written at discretion of primary team  Please contact Acute Pain Service - [facility] via [employer] from [phone] with additional questions or concerns  See [employer] or Ricki for additional contacts and after hours information      Pain History  Current pain location(s): right foot  Pain Scale:   6/10  Quality: burning  24 hour history: well controlled    Opioid requirement previous 24 hours: percocet x 3    Meds/Allergies   current meds:   Current Facility-Administered Medications   Medication Dose Route Frequency    acetaminophen (TYLENOL) tablet 650 mg  650 mg Oral Q6H PRN    albuterol (PROVENTIL HFA,VENTOLIN HFA) inhaler 2 puff  2 puff Inhalation Q6H PRN    ALPRAZolam (XANAX) tablet 0 25 mg  0 25 mg Oral 4x Daily PRN    ammonium lactate (LAC-HYDRIN) 12 % cream   Topical BID PRN    carvedilol (COREG) tablet 25 mg  25 mg Oral BID With Meals    cinacalcet (SENSIPAR) tablet 30 mg  30 mg Oral Daily    fentaNYL (SUBLIMAZE) injection 25 mcg  25 mcg Intravenous Q5 Min PRN  gabapentin (NEURONTIN) capsule 100 mg  100 mg Oral TID    heparin (porcine) subcutaneous injection 5,000 Units  5,000 Units Subcutaneous Q12H [address]    hydrALAZINE (APRESOLINE) injection 5 mg  5 mg Intravenous Q6H PRN    insulin glargine (LANTUS) subcutaneous injection 20 Units 0 2 mL  20 Units Subcutaneous HS    insulin lispro (HumaLOG) 100 units/mL subcutaneous injection 1-5 Units  1-5 Units Subcutaneous TID AC    insulin lispro (HumaLOG) 100 units/mL subcutaneous injection 3 Units  3 Units Subcutaneous TID With Meals    levothyroxine tablet 50 mcg  50 mcg Oral Daily    melatonin tablet 3 mg  3 mg Oral HS    metoclopramide (REGLAN) injection 10 mg  10 mg Intravenous Q6H PRN    NIFEdipine (PROCARDIA XL) 24 hr tablet 30 mg  30 mg Oral Daily    nystatin (MYCOSTATIN) powder   Topical BID    ondansetron (ZOFRAN) injection 4 mg  4 mg Intravenous Once PRN    oxyCODONE-acetaminophen (PERCOCET) 5-325 mg per tablet 1 tablet  1 tablet Oral Q6H PRN    pantoprazole (PROTONIX) EC tablet 40 mg  40 mg Oral Early Morning    polyethylene glycol (MIRALAX) packet 17 g  17 g Oral Daily PRN    senna (SENOKOT) tablet 17 2 mg  2 tablet Oral HS PRN    sertraline (ZOLOFT) tablet 75 mg  75 mg Oral Daily    sevelamer (RENAGEL) tablet 1,600 mg  1,600 mg Oral TID With Meals    torsemide (DEMADEX) tablet 100 mg  100 mg Oral Daily    vancomycin 750 mg in sodium chloride 0 9% 250 mL  750 mg Intravenous After Dialysis       Allergies   Allergen Reactions    Iodinated Diagnostic Agents Itching    Keflex [Cephalexin] Hives    Wound Dressing Adhesive Rash       Objective     Temp:  [96 6 °F (35 9 °C)-97 7 °F (36 5 °C)] 97 7 °F (36 5 °C)  HR:  [62-70] 70  Resp:  [15-22] 18  BP: ()/(48-79) 166/79    Physical Exam  Vitals reviewed  Constitutional:       Appearance: She is obese  HENT:      Head: Normocephalic  Eyes:      Pupils: Pupils are equal, round, and reactive to light     Cardiovascular:      Rate and Rhythm: Normal rate and regular rhythm  Pulses: Normal pulses  Heart sounds: Normal heart sounds  Pulmonary:      Effort: Pulmonary effort is normal       Breath sounds: Normal breath sounds  Abdominal:      General: Abdomen is flat  Musculoskeletal:         General: Normal range of motion  Cervical back: Normal range of motion  Comments: Dressing place on right foot   Neurological:      General: No focal deficit present  Mental Status: She is alert and oriented to person, place, and time  Psychiatric:         Mood and Affect: Mood normal          Behavior: Behavior normal            Lab Results:   Results from last 7 days   Lab Units 12/27/21  0650   WBC Thousand/uL 9 42   HEMOGLOBIN g/dL 7 0*   HEMATOCRIT % 21 5*   PLATELETS Thousands/uL 250      Results from last 7 days   Lab Units 12/27/21  0650 12/22/21  0543 12/21/21  0842   POTASSIUM mmol/L 4 5   < > 4 5   CHLORIDE mmol/L 103   < > 103   CO2 mmol/L 25   < > 25   BUN mg/dL 36*   < > 51*   CREATININE mg/dL 6 11*   < > 8 84*   CALCIUM mg/dL 8 2*   < > 8 2*   ALK PHOS U/L  --   --  141*   ALT U/L  --   --  14   AST U/L  --   --  12    < > = values in this interval not displayed  Counseling / Coordination of Care  Total floor / unit time spent today 15 minutes  Greater than 50% of total time was spent with the patient and / or family counseling and / or coordination of care  Please note that the APS provides consultative services regarding pain management only  With the exception of ketamine, peripheral nerve catheters, and epidural infusions (and except when indicated), final decisions regarding starting or changing doses of analgesic medications are at the discretion of the consulting service  Off hours consultation and/or medication management is generally not available      Sofia Gannon MD  Acute Pain Service

## 2021-12-27 NOTE — CONSULTS
PHYSICAL MEDICINE AND REHABILITATION CONSULT NOTE  Roxanna Mcardle 49 y o  female MRN: [id number]  Unit/Bed#: [facility] 933-01 Encounter: [id number]    Requested by (Physician/Service): Brooke Granado MD  Reason for Consultation:  Assessment of rehabilitation needs    Assessment:  Rehabilitation Diagnosis:    Debility    Right foot ulceration with osteomyelitis s/p right TMA   Impaired mobility and self care    Recommendations:  Rehabilitation Plan:   Continue PT/OT while on acute care   Recommend acute inpatient rehabilitation pending facility acceptance   Consider sleep study in the future to assess for sleep apnea as patient reports daytime fatigue   May benefit from future imaging of her lumbar spine given hx of radiating pain down her legs right > left   Covid-19 Testing: [facility] inpatient rehabilitation units require testing within 48 hours of all potential admissions at this time  *Re-testing is NOT required for patients recovering from COVID-19 infection if isolation has been discontinued per CDC criteria  Medical Co-morbidities Plan:  · Acute on chronic pain  · ESRD on Dialysis   · Nephropathy   · Insulin dependent diabetes   · Neuropathy   · Hypertension  · HX of DVT on coumadin   · HLD  · Anemia of chronic disease  · DVT ppx: Heparin SQ and SCD    Thank you for this consultation  Do not hesitate to contact service with further questions  LOIS Ahn  PM&R    History of Present Illness:  Roxanna Mcardle is a 49 y o  female with a PMH of poorly controlled diabetes, diabetic peripheral neuropathy, hypertension, hyperlipidemia, ESRD on hemodialysis, and hx of DVT who presented to the [address] on 12/20/21 for worsening right foot ulcer  She was previously seen for the wound and discharged on Keflex and to follow up with wound care  She had not gotten the Keflex filled  There was concern for wound probing to the bone and osteomyelitis   Wound cultures grew staph aureus  She underwent a right TMA on 12/26/21  She is on vancomycin for a total of 7 days post-op  She has been non-compliant with NWB status to the RLE  PM&R are consulted for rehabilitation recommendations  The patient was seen in dialysis  She has pain from her surgery which she reports is tolerable  She reports a history of low back pain with shooting pain down her legs right > left  She reports getting injections in her back previously and was on tramadol for pain at home  She reports a hx of constipation but denies any bowel/bladder incontinence  LBM was 12/26/21  She has neuropathy and reports numbness up to her waist   She denies any history of stroke  Review of Systems: 10 point ROS negative except for what is noted in HPI    Function:  Prior level of function and living situation:  The patient lives in a 3 story home with bedroom on the 2nd floor  There are 15 BARBAR[address] the home  She lives with her SO and was independent for ADLs  Her SO is home but ambulates with a cane  Her niece is able to help however works and is only there in the evenings  Current level of function:  Physical Therapy:  Minimal assist for bed mobility, moderate to minimal assist for transfers  Occupational Therapy:  Independent for eating, modified independent for grooming, supervision for UB bathing/dressing, minimal assist for LB bathing/dressing and toileting  Physical Exam:  BP 96/59   Pulse 80   Temp 98 8 °F (37 1 °C)   Resp 18   Ht 5' 6" (1 676 m)   Wt 122 kg (268 lb 15 4 oz)   LMP 02/12/2016 (Exact Date)   SpO2 100%   BMI 43 41 kg/m²        Intake/Output Summary (Last 24 hours) at 12/27/2021 1425  Last data filed at 12/27/2021 1248  Gross per 24 hour   Intake 830 ml   Output --   Net 830 ml       Body mass index is 43 41 kg/m²  Physical Exam   Gen: No acute distress    HEENT:  Normocephalic/Atraumatic, EOMI, Moist mucus membranes  Extremities: Right TMA dressing C/D/I  Pulmonary: Non-labored breathing  GI: Soft, non-tender, non-distended  MSK: ROM limited right DF/PF due to TMA and NWB status, no appreciated spasticity/tone  Integumentary: Skin is warm, dry  No rashes or ulcers  Neuro: AAOx3, speech is fluent  Patient is following commands  Sensation to light touch is preserved  Psych: Normal mood and affect  Social History:    Social History     Socioeconomic History    Marital status: Single     Spouse name: None    Number of children: None    Years of education: None    Highest education level: None   Occupational History    None   Tobacco Use    Smoking status: Never Smoker    Smokeless tobacco: Never Used   Vaping Use    Vaping Use: Never used   Substance and Sexual Activity    Alcohol use: Never     Alcohol/week: 0 0 standard drinks    Drug use: No    Sexual activity: Never     Partners: Male     Birth control/protection: Abstinence   Other Topics Concern    None   Social History Narrative    None     Social Determinants of Health     Financial Resource Strain: Not on file   Food Insecurity: No Food Insecurity    Worried About Running Out of Food in the Last Year: Never true    Rojas of Food in the Last Year: Never true   Transportation Needs: No Transportation Needs    Lack of Transportation (Medical): No    Lack of Transportation (Non-Medical):  No   Physical Activity: Not on file   Stress: Not on file   Social Connections: Not on file   Intimate Partner Violence: Not on file   Housing Stability: Low Risk     Unable to Pay for Housing in the Last Year: No    Number of Places Lived in the Last Year: 0    Unstable Housing in the Last Year: No        Family History:    Family History   Problem Relation Age of Onset    Heart disease Family     Diabetes Family     Heart disease Mother     Cancer Brother         neck    Throat cancer Brother     Ovarian cancer Maternal Aunt 40         Medications:     Current Facility-Administered Medications:    acetaminophen (TYLENOL) tablet 650 mg, 650 mg, Oral, Q6H PRN, Ressie Kezia, DPM, 650 mg at 12/26/21 1739    albuterol (PROVENTIL HFA,VENTOLIN HFA) inhaler 2 puff, 2 puff, Inhalation, Q6H PRN, Ressie Kezia, DPM    ALPRAZolam Dilia Celis) tablet 0 25 mg, 0 25 mg, Oral, 4x Daily PRN, Ressie Kezia, DPM, 0 25 mg at 12/26/21 1820    ammonium lactate (LAC-HYDRIN) 12 % cream, , Topical, BID PRN, Ressie Kezia, DPM, Given at 12/21/21 2207    carvedilol (COREG) tablet 25 mg, 25 mg, Oral, BID With Meals, Ressie Kezia, DPM, 25 mg at 12/27/21 0831    cinacalcet (SENSIPAR) tablet 30 mg, 30 mg, Oral, Daily, Ressie Kezia, DPM, 30 mg at 12/27/21 1145    Diclofenac Sodium (VOLTAREN) 1 % topical gel 2 g, 2 g, Topical, 4x Daily, Eugenia Jain MD    fentaNYL (SUBLIMAZE) injection 25 mcg, 25 mcg, Intravenous, Q5 Min PRN, Skye De Jesus CRNA    gabapentin (NEURONTIN) capsule 100 mg, 100 mg, Oral, TID, Ressie Kezia, DPM, 100 mg at 12/27/21 0831    heparin (porcine) subcutaneous injection 5,000 Units, 5,000 Units, Subcutaneous, Q12H [address], Ressie Kezia, DPM, 5,000 Units at 12/27/21 9029    hydrALAZINE (APRESOLINE) injection 5 mg, 5 mg, Intravenous, Q6H PRN, Ressie Kezia, DPM, 5 mg at 12/21/21 0047    insulin glargine (LANTUS) subcutaneous injection 20 Units 0 2 mL, 20 Units, Subcutaneous, HS, Ressie Kezia, DPM, 20 Units at 12/26/21 2134    insulin lispro (HumaLOG) 100 units/mL subcutaneous injection 1-5 Units, 1-5 Units, Subcutaneous, TID AC, 1 Units at 12/27/21 1153 **AND** Fingerstick Glucose (POCT), , , TID AC, Ressie Kezia, DPM    insulin lispro (HumaLOG) 100 units/mL subcutaneous injection 3 Units, 3 Units, Subcutaneous, TID With Meals, Ressie Kezia, DPM, 3 Units at 12/27/21 1153    levothyroxine tablet 50 mcg, 50 mcg, Oral, Daily, Ressie Kezia, DPM, 50 mcg at 12/27/21 0831    lidocaine (LIDODERM) 5 % patch 1 patch, 1 patch, Topical, Daily, Eugenia Jain MD    melatonin tablet 3 mg, 3 mg, Oral, HS, Ressie Kezia, DPM, 3 mg at 12/26/21 2134    metoclopramide (REGLAN) injection 10 mg, 10 mg, Intravenous, Q6H PRN, Melita Beckwith PA-C    NIFEdipine (PROCARDIA XL) 24 hr tablet 30 mg, 30 mg, Oral, Daily, Terie Norcross, DPM, 30 mg at 12/27/21 1145    nystatin (MYCOSTATIN) powder, , Topical, BID, Terie Norcross, DPM, Given at 12/25/21 1715    ondansetron [facility]) injection 4 mg, 4 mg, Intravenous, Once PRN, Tom Bustamante CRNA    oxyCODONE-acetaminophen (PERCOCET) 5-325 mg per tablet 1 tablet, 1 tablet, Oral, Q6H PRN, Terie Norcross, DPM, 1 tablet at 12/27/21 1145    pantoprazole (PROTONIX) EC tablet 40 mg, 40 mg, Oral, Early Morning, Terie Norcross, DPM, 40 mg at 12/27/21 0546    polyethylene glycol (MIRALAX) packet 17 g, 17 g, Oral, Daily PRN, Terie Norcross, DPM    senna (SENOKOT) tablet 17 2 mg, 2 tablet, Oral, HS PRN, Terie Aure, DPM    sertraline (ZOLOFT) tablet 75 mg, 75 mg, Oral, Daily, Terie Aure, DPM, 75 mg at 12/27/21 0831    sevelamer (RENAGEL) tablet 1,600 mg, 1,600 mg, Oral, TID With Meals, Terie Aure, DPM, 1,600 mg at 12/27/21 1148    torsemide (DEMADEX) tablet 100 mg, 100 mg, Oral, Daily, Terie Norcross, DPM, 100 mg at 12/27/21 0831    vancomycin 750 mg in sodium chloride 0 9% 250 mL, 750 mg, Intravenous, After Dialysis, Stanley Solorio MD, 750 mg at 12/26/21 1820    Past Medical History:     Past Medical History:   Diagnosis Date    Abnormal uterine bleeding (AUB)     Anemia     Anxiety     Arthritis     Chronic kidney disease     Claustrophobia     [facility]     Disease of thyroid gland     DVT (deep venous thrombosis) (HCC)     End stage kidney disease ([address] )     Foot ulcer due to secondary DM ([place] [address] )     Hemodialysis patient ([place] [address] )     Tues, Thurs, Sat    Hypertension     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[place] [address] )     right eye    Morbid obesity ([place] [address] )     Osteomyelitis of fifth toe of right foot ([place] [address] )     Panic attacks     Pulmonary embolism (HCC)     Reflux esophagitis  Thrombophlebitis of saphenous vein     Warfarin anticoagulation         Past Surgical History:     Past Surgical History:   Procedure Laterality Date    AMPUTATION      r 4th toe    ARTERIOVENOUS GRAFT PLACEMENT      CATARACT EXTRACTION Bilateral     CERVICAL BIOPSY  W/ LOOP ELECTRODE EXCISION       SECTION      DIALYSIS FISTULA CREATION      DILATION AND CURETTAGE OF UTERUS      ENDOMETRIAL ABLATION W/ NOVASURE      EYE SURGERY      HYSTERECTOMY      @ age 57 (complete)    IR AV FISTULAGRAM/GRAFTOGRAM  10/11/2019    IR AV FISTULAGRAM/GRAFTOGRAM  2020    IR AV FISTULAGRAM/GRAFTOGRAM  2020    IR NON-TUNNELED CENTRAL LINE PLACEMENT  2021    IR NON-TUNNELED CENTRAL LINE PLACEMENT  10/4/2021    OOPHORECTOMY Bilateral     @ age 57    [address] [facility] Right 2021    Procedure: AMPUTATION TRANSMETATARSAL (TMA);  Surgeon: Caitlin Perez DPM;  [facility];  Service: Podiatry    NE AMPUTATION TOE,MT-P JT Right 2020    Procedure: AMPUTATION TOE- 5th;  Surgeon: Caitlin Perez DPM;  Location: [facility];  Service: Podiatry    NE COLONOSCOPY FLX DX W/LANDON Castillo 1978 PFRMD N/A 3/13/2019    Procedure: COLONOSCOPY;  Surgeon: Pipo Castorena MD;  Location: [facility]; Service: Gastroenterology    NE ESOPHAGOGASTRODUODENOSCOPY TRANSORAL DIAGNOSTIC N/A 3/13/2019    Procedure: ESOPHAGOGASTRODUODENOSCOPY (EGD); Surgeon: Pipo Castorena MD;  Location: [facility];   Service: Gastroenterology    NE LAPAROSCOPY W TOT HYSTERECT UTERUS 250 GRAM OR LESS N/A 2016    Procedure: HYSTERECTOMY LAPAROSCOPIC TOTAL [facility]), with bilateral salpingectomy;  Surgeon: Filemon Leonard DO;  [facility]: [facility];  Service: Gynecology    THROMBECTOMY / ARTERIOVENOUS GRAFT REVISION      TOE SURGERY Right     removal of the 4th toe    WOUND DEBRIDEMENT Right 10/8/2021    Procedure: R 1st MTPJ washout ;  Surgeon: Krystal Crump DPM;  [facility];  Service: Podiatry         Allergies: Allergies   Allergen Reactions    Iodinated Diagnostic Agents Itching    Keflex [Cephalexin] Hives    Wound Dressing Adhesive Rash           LABORATORY RESULTS:      Lab Results   Component Value Date    HGB 7 0 (L) 12/27/2021    HGB 8 7 (L) 10/11/2015    HCT 21 5 (L) 12/27/2021    HCT 26 3 (L) 10/11/2015    WBC 9 42 12/27/2021    WBC 8 45 10/11/2015     Lab Results   Component Value Date    BUN 36 (H) 12/27/2021    BUN 64 (H) 10/11/2015     10/11/2015    K 4 5 12/27/2021    K 4 7 10/11/2015     12/27/2021    CL 94 (L) 10/11/2015    GLUCOSE 196 (H) 07/30/2018    GLUCOSE 198 (H) 10/11/2015    CREATININE 6 11 (H) 12/27/2021    CREATININE 6 68 (H) 10/11/2015     Lab Results   Component Value Date    PROTIME 14 2 12/27/2021    PROTIME 24 3 (H) 12/31/2015    INR 1 14 12/27/2021    INR 2 31 (H) 12/31/2015        DIAGNOSTIC STUDIES: Reviewed  XR foot 3+ views RIGHT    Result Date: 12/21/2021  Impression: Bone destruction involving the medial aspect of the distal 1st metatarsal compatible with acute osteomyelitis  Postoperative changes  Workstation performed: [id number]     MRI foot/forefoot toes right wo contrast    Result Date: 12/22/2021  Impression: Findings consistent with osteomyelitis of the 1st metatarsal and proximal phalanx The study was marked in EPIC for immediate notification   Workstation performed: [id number]

## 2021-12-27 NOTE — ARC ADMISSION
Referral received for consideration of patient for inpatient acute rehab  Will continue to follow patient's case at this time, monitoring for medical stability and functional progress

## 2021-12-27 NOTE — OCCUPATIONAL THERAPY NOTE
Occupational Therapy Evaluation     Patient Name: Vinod Walton  MPBDR'K Date: 2021  Problem List  Principal Problem:    Right foot ulceration with osteomyelitis  Active Problems:    History of venous thromboembolism    ESRD on hemodialysis    Anemia of chronic disease    Morbid obesity    Hyperlipidemia    Insulin-dependent diabetes mellitus with neuropathy    Essential hypertension    Past Medical History  Past Medical History:   Diagnosis Date    Abnormal uterine bleeding (AUB)     Anemia     Anxiety     Arthritis     Chronic kidney disease     Claustrophobia     Diabetes mellitus [facility]     Disease of thyroid gland     DVT (deep venous thrombosis) (HCC)     End stage kidney disease (HCC)     Foot ulcer due to secondary DM ([facility] )     Hemodialysis patient ([facility] )     Tues, Th, Sat    Hypertension     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[facility] )     right eye    Morbid obesity ([facility] )     Osteomyelitis of fifth toe of right foot (HCC)     Panic attacks     Pulmonary embolism (HCC)     Reflux esophagitis     Thrombophlebitis of saphenous vein     Warfarin anticoagulation      Past Surgical History  Past Surgical History:   Procedure Laterality Date    AMPUTATION      r 4th toe    ARTERIOVENOUS GRAFT PLACEMENT      CATARACT EXTRACTION Bilateral     CERVICAL BIOPSY  W/ LOOP ELECTRODE EXCISION       SECTION      DIALYSIS FISTULA CREATION      DILATION AND CURETTAGE OF UTERUS      ENDOMETRIAL ABLATION W/ NOVASURE      EYE SURGERY      HYSTERECTOMY      @ age 57 (complete)    IR AV FISTULAGRAM/GRAFTOGRAM  10/11/2019    IR AV FISTULAGRAM/GRAFTOGRAM  2020    IR AV FISTULAGRAM/GRAFTOGRAM  2020    IR NON-TUNNELED CENTRAL LINE PLACEMENT  2021    IR NON-TUNNELED CENTRAL LINE PLACEMENT  10/4/2021    OOPHORECTOMY Bilateral     @ age 57    PERICARDIUM SURGERY      WA AMPUTATION FOOT,TRANSMETATARSAL Right 2021    Procedure: AMPUTATION TRANSMETATARSAL (TMA);  Surgeon: Nga Blackmon DPM;  [facility];  Service: Podiatry    OK AMPUTATION TOE,MT-P JT Right 5/28/2020    Procedure: AMPUTATION TOE- 5th;  Surgeon: Nga Blackmon DPM;  [facility];  Service: Podiatry    OK COLONOSCOPY FLX DX W/COLLJ [address] PFRMD N/A 3/13/2019    Procedure: COLONOSCOPY;  Surgeon: Yoselin Villatoro MD;  [facility]; Service: Gastroenterology    OK ESOPHAGOGASTRODUODENOSCOPY TRANSORAL DIAGNOSTIC N/A 3/13/2019    Procedure: ESOPHAGOGASTRODUODENOSCOPY (EGD); Surgeon: Yoselin Villatoro MD;  [facility]; Service: Gastroenterology    OK LAPAROSCOPY W TOT HYSTERECT UTERUS 250 GRAM OR LESS N/A 2/16/2016    Procedure: HYSTERECTOMY LAPAROSCOPIC TOTAL [facility]), with bilateral salpingectomy;  Surgeon: Chava Medellin DO;  [facility];  Service: Gynecology    THROMBECTOMY / ARTERIOVENOUS GRAFT REVISION      TOE SURGERY Right     removal of the 4th toe    WOUND DEBRIDEMENT Right 10/8/2021    Procedure: R 1st MTPJ washout ;  Surgeon: Gena Curiel DPM;  [facility];  Service: Podiatry           12/27/21 0906   OT Last Visit   OT Visit Date 12/27/21   Note Type   Note type Evaluation   Restrictions/Precautions   Weight Bearing Precautions Per Order Yes   RLE Weight Bearing Per Order NWB   Other Precautions Chair Alarm; Bed Alarm; Fall Risk;Pain   Pain Assessment   Pain Assessment Tool 0-10   Pain Score 8   Home Living   Type of Home House   Home Layout Multi-level;1/2 bath on main level;Bed/bath upstairs  (3 story house; [address])   Bathroom Shower/Tub Tub/shower unit   [address] [place] [address]  chair   Home Equipment Walker; Wheelchair-manual  (per pt did not use PTA )   Prior Function   Level of [place] Independent with ADLs and functional mobility   Lives With Significant other; Other (Comment)  (boyfriend, niece, and children )   Receives Help From Family   ADL Assistance Independent   IADLs Independent   Falls in the last 6 months 1 to 4  (3-4 per pt )   Vocational Unemployed   Lifestyle   Autonomy Pt lives in 2 story house w [address]; I ADL/IADL PTA; no AD for mobility; (-) drive    Reciprocal Relationships Pt lives w her s/o, niece, and her children who are able to assist    Service to Others 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     Intrinsic Gratification Pt enjoys spending time w her son    ADL   Eating Assistance 7  Independent   Grooming Assistance 6  [address] 5  [address] [facility] 5  [address] include 234 beds at the [facility] [address] 4  [address]  4  Minimal Assistance   Bed Mobility   Supine to Sit 4  Minimal assistance   Additional items Assist x 1;HOB elevated; Bedrails; Increased time required;Verbal cues   Additional Comments Pt OOB in chair to end OT eval    Transfers   Sit to Stand 4  Minimal assistance   Additional items Assist x 1; Increased time required;Verbal cues   Stand to Sit 4  Minimal assistance   Additional items Assist x 1; Increased time required;Verbal cues   Stand pivot 3  Moderate assistance   Additional items Assist x 1; Increased time required;Verbal cues   Additional Comments Transfers w RW increased difficulty maintaining WB status    Balance   Static Sitting Fair +   Dynamic Sitting Fair   Static Standing Poor +   Dynamic Standing Poor   Activity Tolerance   Activity Tolerance Patient limited by fatigue;Patient limited by pain   Medical Staff Made Aware Co-eval w OT 2* medical complexity and comorbidites    Nurse Made Aware RN cleared pt for OT eval    RUE Assessment   RUE Assessment WFL   LUE Assessment   LUE Assessment WFL   Hand Function   Gross Motor Coordination Impaired   Fine Motor Coordination Functional   Sensation   Light Touch No apparent deficits   Vision-Basic Assessment   Current Vision No visual deficits   Cognition   Overall Cognitive Status WFL   Arousal/Participation Alert; Responsive; Cooperative   Attention Within functional limits   Orientation Level Oriented X4   Memory Within functional limits   Following Commands Follows all commands and directions without difficulty   Comments Pt pleasant, cooperative, and willing to participate in OT eval    Assessment   Limitation Decreased ADL status; Decreased Safe judgement during ADL;Decreased endurance;Decreased self-care trans;Decreased high-level ADLs   Prognosis Fair   Assessment Pt is a 49 y o  female admitted to [facility] on [facility]/2021 w/ right foot ulcer s/p R transmetatarsal amputation  Pt  has a past medical history of Abnormal uterine bleeding (AUB), Anemia, Anxiety, Arthritis, Chronic kidney disease, Claustrophobia, Diabetes mellitus ([facility] ), Disease of thyroid gland, DVT (deep venous thrombosis) ([facility] ), End stage kidney disease ([facility] ), Foot ulcer due to secondary DM ([facility] ), Hemodialysis patient ([facility] ), Hypertension, 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 ), Morbid obesity ([facility] ), Osteomyelitis of fifth toe of right foot ([facility] ), Panic attacks, Pulmonary embolism ([facility] ), Reflux esophagitis, Thrombophlebitis of saphenous vein, and Warfarin anticoagulation  Pt with active OT eval and treat orders  Pt resides in a 3 story home with [address] w her s/o, niece, and children  Pt was I w/  ADLS and IADLS, (-) drives, & required no use of DME PTA  Currently pt performs bed mobility w Jules, transfers with Jules, stand pivot transfers from bed to chair w Kiersten Stearns, UB ADLs w SUP, and LB ADLs with Jules  Pt is limited at this time 2*: pain, endurance, activity tolerance, functional mobility, balance, decreased I w/ ADLS/IADLS, decreased safety awareness and decreased insight into deficits  The following Occupational Performance Areas to address include: grooming, bathing/shower, toilet hygiene, dressing and functional mobility   Based on the aforementioned OT evaluation, functional performance deficits, and assessments, pt has been identified as a high complexity evaluation  From OT standpoint, anticipate d/c STR  Pt to continue to benefit from acute immediate OT services to address the following goals 3-5x/week to  w/in 10-14 days:   Pt OOB in chair to end OT eval w all current needs met and call bell within reach  The patient's raw score on the AM-PAC Daily Activity inpatient short form is 20, standardized score is 42 03, greater than 39 4  Patients at this level are likely to benefit from discharge to post-acute rehabilitation services  Please refer to the recommendation of the Occupational Therapist for safe discharge planning  Goals   Patient Goals to go home    LTG Time Frame 10-   Long Term Goal #1 see goals below    Plan   Treatment Interventions ADL retraining;Functional transfer training; Endurance training;Patient/family training; Compensatory technique education;Continued evaluation; Energy conservation; Activityengagement   Goal Expiration Date 01/10/22   OT Frequency 3-5x/wk   Recommendation   OT Discharge Recommendation Post acute rehabilitation services   OT - OK to Discharge Yes  (When medically appropriate)   AM-PAC Daily Activity Inpatient   Lower Body Dressing 3   Bathing 3   Toileting 3   Upper Body Dressing 3   Grooming 4   Eating 4   Daily Activity Raw Score 20   Daily Activity Standardized Score (Calc for Raw Score >=11) 42 03   AM-PAC Applied Cognition Inpatient   Following a Speech/Presentation 4   Understanding Ordinary Conversation 4   Taking Medications 4   Remembering Where Things Are Placed or Put Away 4   Remembering List of 4-5 Errands 4   Taking Care of Complicated Tasks 4   Applied Cognition Raw Score 24   Applied Cognition Standardized Score 62 21     GOALS    1) Pt will increase activity tolerance to G for 30 min txment sessions    2) Pt will complete UB/LB dressing/self care w/ mod I using adaptive device and DME as needed    3) Pt will complete bathing w/ Mod I w/ use of AE and DME as needed    4) Pt will complete toileting w/ mod I w/ G hygiene/thoroughness using DME as needed    5) Pt will improve functional transfers to Mod I on/off all surfaces using DME as needed w/ G balance/safety     6) Pt will improve functional mobility during ADL/IADL/leisure tasks to Mod I using DME as needed w/ G balance/safety     7) Pt will participate in simulated IADL management task with DME as needed to increase independence to  w/ G safety and endurance    8) Pt will demonstrate G carryover of pt/caregiver education and training as appropriate  9) Pt will demonstrate 100% carryover of energy conservation techniques t/o functional I/ADL/leisure tasks w/o cues s/p skilled education    10) Pt will independently identify and utilize 2-3 coping strategies to increase positive affect and promote overall well-being      11) Pt will engage in ongoing cognitive assessment w/ G participation to assist w/ safe d/c planning/recommendations    Tara Brooks OTR/L

## 2021-12-27 NOTE — PROGRESS NOTES
Progress Note - Infectious Disease   Kat Shepherd 49 y o  female MRN: [id number]  Unit/Bed#: [facility] 933-01 Encounter: [id number]      Impression/Plan:  1   Right foot cellulitis   Due to #2   Blood cultures negative   Clinically stable without sepsis   Improving  Rec:  · Continue antibiotics as below  · Follow temperatures and WBC closely  · Supportive care as per the primary service     2   Chronic right foot DM ulcer with 1st MT/toe osteomyelitis   In setting of #3   PTB by Podiatry  Ron Cartwright, MRI show bone infection   Wound culture with MRSA  Rec:  · Continue vancomycin for now through the weekend  · Pharmacy consult for vancomycin dosing  · Await TMA per Podiatry next week when INR normalizes  · If complete surgical cure of soft tissue and bone infection, discontinue antibiotics  · If still remaining soft tissue infection, continue vancomycin for 7 days postoperatively     3   Recent 1st MTPJ septic arthritis   2021  Status post washout   Fluid aspirate with MRSA, OR cultures with Enterococcus, lactobacillus, Clostridium   Status post 6 weeks IV antibiotics through 75/3   Now complicated by above      4   Poorly controlled DM with DPN    Recent A1c 2021 7 9   Risk factor for above      5   ESRD on HD  Martinez Means left AVF  Rec:  · Dose adjust antibiotics for HD  · Nephrology follow-up ongoing    Discussed the above management plan with the primary service    Antibiotics:  Vancomycin 6  Postop day 1    Subjective:  Patient has no fever, chills, sweats; no nausea, vomiting, diarrhea; no cough, shortness of breath; no pain  No new symptoms    Patient underwent transmetatarsal amputation yesterday    Objective:  Vitals:  Temp:  [96 6 °F (35 9 °C)-97 7 °F (36 5 °C)] 97 7 °F (36 5 °C)  HR:  [62-70] 70  Resp:  [15-22] 18  BP: ()/(48-79) 166/79  SpO2:  [100 %] 100 %  Temp (24hrs), Av 3 °F (36 3 °C), Min:96 6 °F (35 9 °C), Max:97 7 °F (36 5 °C)  Current: Temperature: 97 7 °F (36 5 °C)    Physical Exam: General Appearance:  Alert, interactive, nontoxic, no acute distress  Throat: Oropharynx moist without lesions  Lungs:   Clear to auscultation bilaterally; no wheezes, rhonchi or rales; respirations unlabored   Heart:  RRR; no murmur, rub or gallop   Abdomen:   Soft, non-tender, non-distended, positive bowel sounds  Extremities: No clubbing, cyanosis or edema   Skin: No new rashes or lesions  No draining wounds noted  Labs, Imaging, & Other studies:   All pertinent labs and imaging studies were personally reviewed  Results from last 7 days   Lab Units 12/27/21  0650 12/26/21  0823 12/25/21  0640   WBC Thousand/uL 9 42 6 78 6 03   HEMOGLOBIN g/dL 7 0* 7 6* 7 5*   PLATELETS Thousands/uL 250 224 217     Results from last 7 days   Lab Units 12/27/21  0650 12/26/21  1501 12/26/21  1501 12/26/21  0823 12/26/21  0823 12/22/21  0543 12/21/21  0842   SODIUM mmol/L 136  --  135*  --  132*   < > 136   POTASSIUM mmol/L 4 5   < > 4 2   < > 5 7*   < > 4 5   CHLORIDE mmol/L 103   < > 101   < > 105   < > 103   CO2 mmol/L 25   < > 27   < > 22   < > 25   BUN mg/dL 36*   < > 30*   < > 74*   < > 51*   CREATININE mg/dL 6 11*   < > 4 78*   < > 9 70*   < > 8 84*   EGFR ml/min/1 73sq m 7   < > 9   < > 4   < > 4   CALCIUM mg/dL 8 2*   < > 8 2*   < > 8 7   < > 8 2*   AST U/L  --   --   --   --   --   --  12   ALT U/L  --   --   --   --   --   --  14   ALK PHOS U/L  --   --   --   --   --   --  141*    < > = values in this interval not displayed  Results from last 7 days   Lab Units 12/20/21 1954 12/20/21 1953 12/20/21 1952   BLOOD CULTURE   --  No Growth After 5 Days  No Growth After 5 Days     GRAM STAIN RESULT  Rare Polys  No bacteria seen  --   --    WOUND CULTURE  2+ Growth of Methicillin Resistant Staphylococcus aureus*  --   --          Results from last 7 days   Lab Units 12/20/21  1619   CRP mg/L 103 0*

## 2021-12-27 NOTE — PHYSICAL THERAPY NOTE
Physical Therapy Evaluation     Patient's Name: Duane Horton    Admitting Diagnosis  Foot pain [A69 209]  Foot ulcer ([facility] ) [L96 556]    Problem List  Patient Active Problem List   Diagnosis    Essential hypertension    History of venous thromboembolism    Abnormal uterine bleeding    Glaucoma    ESRD on hemodialysis    Anxiety disorder    Knee pain    Ambulatory dysfunction    Hemodialysis status ([facility] )    Primary osteoarthritis of right knee    Esophageal reflux    Colon cancer screening    Gastroesophageal reflux disease    Mening[facility]oma ([facility])    Nausea and vomiting    Secondary hyperparathyroidism of renal origin ([facility] )    Dyspnea    Anemia of chronic disease    Disruption of internal surgical wound    Hyponatremia    Parenchymal renal hypertension    Candidiasis of breast    Hemodialysis-associated hypotension    Lumbar radiculopathy    Low back pain with sciatica    Flu-like symptoms    Diabetic polyneuropathy associated with type 2 diabetes mellitus ([place] [address] )    Tinea pedis    Encounter for diabetic foot exam ([place] [address] )    Corns and callosities    History of amputation of lesser toe of right foot ([facility] )    Morbid obesity    Hyperlipidemia    History of claustrophobia    Allergic rhinitis    Benign neoplasm of skin    Cardiomyopathy ([facility]  )    Cervical dysplasia    Chronic endometritis    Complication from renal dialysis device    Concussion without loss of consciousness    Insulin-dependent diabetes mellitus with neuropathy    Acquired hypothyroidism    Legal blindness, as defined in [place] [facility]    Limb pain    Mononeuritis of upper limb, unspecified laterality    Phlebitis and thrombophlebitis of superficial vessels of lower extremities    Pulmonary embolus ([place] [address] )    S/P foot surgery, right    Tinea unguium    Bilateral primary osteoarthritis of knee    Bilateral patellofemoral syndrome    Chronic pain syndrome    A-V fistula (HCC)    Right foot pain    Arteriovenous fistula for hemodialysis in place, primary ([facility] )    Healthcare-associated pneumonia    Pneumonia    Chronic anticoagulation    Essential hypertension    Sigmoid diverticulitis    Postop check    Pruritus    Right foot ulceration with osteomyelitis       Past Medical History  Past Medical History:   Diagnosis Date    Abnormal uterine bleeding (AUB)     Anemia     Anxiety     Arthritis     Chronic kidney disease     Claustrophobia     Diabetes mellitus ([facility] )     Disease of thyroid gland     DVT (deep venous thrombosis) (HCC)     End stage kidney disease (HCC)     Foot ulcer due to secondary DM ([facility] )     Hemodialysis patient ([place] [address] )     Tues, Thurs, Sat    Hypertension     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[facility] )     right eye    Morbid obesity ([facility]  )     Osteomyelitis of fifth toe of right foot ([facility]  )     Panic attacks     Pulmonary embolism (HCC)     Reflux esophagitis     Thrombophlebitis of saphenous vein     Warfarin anticoagulation        Past Surgical History  Past Surgical History:   Procedure Laterality Date    AMPUTATION      r 4th toe    ARTERIOVENOUS GRAFT PLACEMENT      CATARACT EXTRACTION Bilateral     CERVICAL BIOPSY  W/ LOOP ELECTRODE EXCISION       SECTION      DIALYSIS FISTULA CREATION      DILATION AND CURETTAGE OF UTERUS      ENDOMETRIAL ABLATION W/ NOVASURE      EYE SURGERY      HYSTERECTOMY      @ age 57 (complete)    IR AV FISTULAGRAM/GRAFTOGRAM  10/11/2019    IR AV FISTULAGRAM/GRAFTOGRAM  2020    IR AV FISTULAGRAM/GRAFTOGRAM  2020    IR NON-TUNNELED CENTRAL LINE PLACEMENT  2021    IR NON-TUNNELED CENTRAL LINE PLACEMENT  10/4/2021    OOPHORECTOMY Bilateral     @ age 57    PERICARDIUM SURGERY      UT AMPUTATION FOOT,TRANSMETATARSAL Right 2021    Procedure: AMPUTATION TRANSMETATARSAL (TMA);  Surgeon: Josy Vizcaino DPM;  Location: [facility];  Service: Podiatry    UT AMPUTATION TOE,MT-P JT Right 5/28/2020    Procedure: AMPUTATION TOE- 5th;  Surgeon: Ktahy Anglin DPM;  Location: [facility];  Service: Podiatry    SC COLONOSCOPY FLX DX W/COLLJ [address] PFRMD N/A 3/13/2019    Procedure: COLONOSCOPY;  Surgeon: Saurabh Hung MD;  Location: [facility]; Service: Gastroenterology    SC ESOPHAGOGASTRODUODENOSCOPY TRANSORAL DIAGNOSTIC N/A 3/13/2019    Procedure: ESOPHAGOGASTRODUODENOSCOPY (EGD); Surgeon: Saurabh Hung MD;  Location: [facility]; Service: Gastroenterology    SC LAPAROSCOPY W TOT HYSTERECT UTERUS 250 GRAM OR LESS N/A 2/16/2016    Procedure: HYSTERECTOMY LAPAROSCOPIC TOTAL [facility]), with bilateral salpingectomy;  Surgeon: Zoë Hawk DO;  [facility];  Service: Gynecology    THROMBECTOMY / ARTERIOVENOUS GRAFT REVISION      TOE SURGERY Right     removal of the 4th toe    WOUND DEBRIDEMENT Right 10/8/2021    Procedure: R 1st MTPJ washout ;  Surgeon: Kristine Holland DPM;  [facility];  Service: Podiatry        12/27/21 0905   PT Last Visit   PT Visit Date 12/27/21   Note Type   Note type Evaluation   Pain Assessment   Pain Assessment Tool 0-10   Pain Score 8   Pain Location/Orientation Orientation: Right;Location: Foot   Patient's Stated Pain Goal No pain   Hospital Pain Intervention(s) Repositioned; Ambulation/increased activity   Restrictions/Precautions   Weight Bearing Precautions Per Order Yes   RLE Weight Bearing Per Order NWB (pt s/p R TMA on 12/26)    Other Precautions Chair Alarm; Bed Alarm; Fall Risk;Pain;Visual impairment   Home Living   Type of [address] [place] [address] Multi-level;1/2 bath on main level;Bed/bath upstairs  (3 SH, 15 BARBARA )   Bathroom Shower/Tub Tub/shower unit   Bathroom Toilet Standard   Bathroom Equipment Shower chair   Home Equipment Walker; Wheelchair-manual  (did not use PTA )   Prior Function   Level of [place] Independent with ADLs and functional mobility   Lives With Significant other; Other (Comment)  (boyfriend and niece )   Receives Help From Family   ADL Assistance Independent   IADLs Independent   Falls in the last 6 months 1 to 4  ("3-4" per pt report )   General   Family/Caregiver Present No   Cognition   Overall Cognitive Status WFL   Arousal/Participation Cooperative   Orientation Level Oriented X4   Memory Within functional limits   Following Commands Follows one step commands without difficulty   Comments Pt pleasant and cooperative to participate in therapy session; frustrated with medical status- emotional support provided  RLE Assessment   RLE Assessment   (functionally 3+/5; limited 2* pain )   LLE Assessment   LLE Assessment   (functionally 4-/5 )   Bed Mobility   Supine to Sit 4  Minimal assistance   Additional items Assist x 1;HOB elevated; Bedrails; Increased time required;Verbal cues   Sit to Supine Unable to assess   Additional Comments Pt supine in bed upon arrival  pt sitting upright in bedside chair with all needs within reach at the end of therapy session    Transfers   Sit to Stand 4  Minimal assistance   Additional items Assist x 1; Increased time required;Verbal cues   Stand to Sit 4  Minimal assistance   Additional items Assist x 1; Increased time required;Verbal cues   Stand pivot 3  Moderate assistance   Additional items Assist x 1; Increased time required;Verbal cues   Additional Comments transfers with RW; cues for hand placement and sequencing   (unable to perform hop to gait pattern- instead performed SPT to bedside chair)   Ambulation/Elevation   Gait pattern Not appropriate  (unable to perform hop to gait pattern; pt NWB on RLE )   Balance   Static Sitting Fair +   Dynamic Sitting Fair   Static Standing Poor +   Dynamic Standing Poor   Activity Tolerance   Activity Tolerance Patient limited by fatigue;Patient limited by pain   Medical Staff Made Aware OT; co-eval performed this date 2* increased medical complexity and multiple co-morbidities    Nurse Made Aware RN cleared pt to participate in therapy session    Assessment Prognosis Good   Problem List Decreased strength;Decreased endurance; Impaired balance;Decreased mobility; Decreased skin integrity;Pain;Orthopedic restrictions   Assessment Pt seen for high complexity PT evaluation  Pt with active PT eval/treat orders  Pt is a 49 y o  female who was admitted to [facility] on 12/20/2021 with R foot ulceration with osteomyelitis s/p R TMA  Pt currently NWB on RLE  Pt's current dx/ problem list include: ESRD, anemia, obesity, hyperlipidemia, DM with neuropathy, HTN  Comorbidities affecting pt's physical performance at time of assessment are as follows:  has a past medical history of Abnormal uterine bleeding (AUB), Anemia, Anxiety, Arthritis, Chronic kidney disease, Claustrophobia, Diabetes mellitus ([facility] ), Disease of thyroid gland, DVT (deep venous thrombosis) ([facility] ), End stage kidney disease ([facility] ), Foot ulcer due to secondary DM ([facility] ), Hemodialysis patient ([facility] ), Hypertension, 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[facility] ), Morbid obesity ([facility] ), Osteomyelitis of fifth toe of right foot ([facility] ), Panic attacks, Pulmonary embolism ([facility] ), Reflux esophagitis, Thrombophlebitis of saphenous vein, and Warfarin anticoagulation  Personal factors affecting pt at time of initial examination include: steps to enter environment, limited home support, advanced age, past experience, inability to perform IADLs, inability to perform ADLs, inability to ambulate household distances, limited insight into impairments and recent fall(s)  Due to acute medical issues, ongoing medical workup for primary dx; pain, fall risk, increased reliance on more restrictive AD compared to baseline;  decreased activity tolerance compared to baseline, increased assistance needed from caregiver at current time,  multiple lines, decline in overall functional mobility status; health management issues; note unstable clinical picture (high complexity)   At baseline, pt resides with SO and niece in [address] [place] [address] with 15 BARBARA and was independent without AD prior to hospital admission  Currently, upon initial examination, pt  is requiring min A for bed mobility skills; min A for functional transfers; pt unable to perform hop to gait pattern to maintain NWB on RLE, instead performed stand pivot transfer with mod Ax1  Currently, pt presents functioning below baseline and with overall mobility deficits 2* to: decreased LE strength/AROM; limited flexibility;  generalized weakness/ deconditioning; decreased endurance; decreased activity tolerance; decreased coordination; impaired balance; gait deviations; decreased safety awareness fatigue; impaired safety and judgement; limited insight into current deficits; bed/ chair alarms; multiple lines; hearing impairment/ visual impairments; as well as: weakness, decreased ROM, impaired balance, decreased endurance, impaired coordination, gait deviations, pain, decreased activity tolerance, decreased functional mobility tolerance, altered sensation, decreased safety awareness, fall risk, orthopedic restrictions and decreased skin integrity  Pt currently at increased risk for falls  At the end of evaluation, pt was left sitting upright in bedside chair  with all needs in reach  Pt would benefit from continued skilled PT services while in hospital to address remaining limitations and maximize functional potential  PT to continue to follow pt and recommends post-acute rehab services after d/c  The patient's AM-PAC Basic Mobility Inpatient Short Form Raw Score is 15  A Raw score of less than or equal to 16 suggests the patient may benefit from discharge to post-acute rehabilitation services  Please also refer to the recommendation of the Physical Therapist for safe discharge planning  Barriers to Discharge Inaccessible home environment  (pt has 15 BARBARA )   Goals   Patient Goals to go home    STG Expiration Date 01/10/22   Short Term Goal #1 STG 1   Pt will be able to perform bed mobility tasks with mod I in order to improve overall functional mobility and assist in safe d/c  STG 2  Pt will be able to perform functional transfer with mod I in order to improve overall functional mobility and assist in safe d/c  STG 3  Pt will be able to ambulate at least 50 feet with least restrictive device with mod I A in order to improve overall functional mobility and assist in safe d/c  STG 4  Pt will improve sitting/standing static/dynamic balance 1/2 grade in order to improve functional mobility and assist in safe d/c  STG 5  Pt will improve LE strength by 1/2 grade in order to improve functional mobility and assist in safe d/c  STG 6  Pt will be able to negotiate at least 15 stairs with least restrictive device with mod I A in order to improve overall functional mobility and assist in safe d/c   PT Treatment Day 0   Plan   Treatment/Interventions ADL retraining;Functional transfer training;LE strengthening/ROM; Patient/family training;Equipment eval/education; Bed mobility;Gait training;Spoke to nursing;Spoke to case management;OT   PT Frequency 3-5x/wk   Recommendation   PT Discharge Recommendation Post acute rehabilitation services   Equipment Recommended Walker; Wheelchair   AM-PAC Basic Mobility Inpatient   Turning in Bed Without Bedrails 3   Lying on Back to Sitting on Edge of Flat Bed 3   Moving Bed to Chair 3   Standing Up From Chair 3   Walk in Room 2   Climb 3-5 Stairs 1   Basic Mobility Inpatient Raw Score 15   Basic Mobility Standardized Score 36 97   Highest Level Of Mobility   JH-HLM Goal 4: Move to chair/commode   JH-HLM Highest Level of Mobility 5: Stand (1 or more minutes)   JH-HLM Goal Achieved Yes   End of Consult   Patient Position at End of Consult Bedside chair; All needs within reach           Susi Larson, PT

## 2021-12-28 ENCOUNTER — APPOINTMENT (INPATIENT)
Dept: DIALYSIS | Facility: HOSPITAL | Age: 56
DRG: 617 | End: 2021-12-28
Payer: MEDICARE

## 2021-12-28 LAB
ABO GROUP BLD: NORMAL
ANION GAP SERPL CALCULATED.3IONS-SCNC: 9 MMOL/L (ref 4–13)
BASOPHILS # BLD AUTO: 0.04 THOUSANDS/ΜL (ref 0–0.1)
BASOPHILS NFR BLD AUTO: 0 % (ref 0–1)
BLD GP AB SCN SERPL QL: NEGATIVE
BUN SERPL-MCNC: 48 MG/DL (ref 5–25)
CALCIUM SERPL-MCNC: 8.8 MG/DL (ref 8.3–10.1)
CHLORIDE SERPL-SCNC: 101 MMOL/L (ref 100–108)
CO2 SERPL-SCNC: 23 MMOL/L (ref 21–32)
CREAT SERPL-MCNC: 8.17 MG/DL (ref 0.6–1.3)
EOSINOPHIL # BLD AUTO: 0.44 THOUSAND/ΜL (ref 0–0.61)
EOSINOPHIL NFR BLD AUTO: 5 % (ref 0–6)
ERYTHROCYTE [DISTWIDTH] IN BLOOD BY AUTOMATED COUNT: 14.9 % (ref 11.6–15.1)
GFR SERPL CREATININE-BSD FRML MDRD: 5 ML/MIN/1.73SQ M
GLUCOSE SERPL-MCNC: 155 MG/DL (ref 65–140)
GLUCOSE SERPL-MCNC: 242 MG/DL (ref 65–140)
GLUCOSE SERPL-MCNC: 246 MG/DL (ref 65–140)
GLUCOSE SERPL-MCNC: 270 MG/DL (ref 65–140)
GLUCOSE SERPL-MCNC: 284 MG/DL (ref 65–140)
HCT VFR BLD AUTO: 20.2 % (ref 34.8–46.1)
HGB BLD-MCNC: 6.4 G/DL (ref 11.5–15.4)
IMM GRANULOCYTES # BLD AUTO: 0.08 THOUSAND/UL (ref 0–0.2)
IMM GRANULOCYTES NFR BLD AUTO: 1 % (ref 0–2)
INR PPP: 1.1 (ref 0.84–1.19)
LYMPHOCYTES # BLD AUTO: 1.1 THOUSANDS/ΜL (ref 0.6–4.47)
LYMPHOCYTES NFR BLD AUTO: 12 % (ref 14–44)
MCH RBC QN AUTO: 30 PG (ref 26.8–34.3)
MCHC RBC AUTO-ENTMCNC: 31.7 G/DL (ref 31.4–37.4)
MCV RBC AUTO: 95 FL (ref 82–98)
MONOCYTES # BLD AUTO: 0.79 THOUSAND/ΜL (ref 0.17–1.22)
MONOCYTES NFR BLD AUTO: 9 % (ref 4–12)
NEUTROPHILS # BLD AUTO: 6.57 THOUSANDS/ΜL (ref 1.85–7.62)
NEUTS SEG NFR BLD AUTO: 73 % (ref 43–75)
NRBC BLD AUTO-RTO: 0 /100 WBCS
PLATELET # BLD AUTO: 210 THOUSANDS/UL (ref 149–390)
PMV BLD AUTO: 10.7 FL (ref 8.9–12.7)
POTASSIUM SERPL-SCNC: 4.7 MMOL/L (ref 3.5–5.3)
PROTHROMBIN TIME: 13.8 SECONDS (ref 11.6–14.5)
RBC # BLD AUTO: 2.13 MILLION/UL (ref 3.81–5.12)
RH BLD: POSITIVE
SODIUM SERPL-SCNC: 133 MMOL/L (ref 136–145)
SPECIMEN EXPIRATION DATE: NORMAL
WBC # BLD AUTO: 9.02 THOUSAND/UL (ref 4.31–10.16)

## 2021-12-28 PROCEDURE — 85610 PROTHROMBIN TIME: CPT | Performed by: INTERNAL MEDICINE

## 2021-12-28 PROCEDURE — 80048 BASIC METABOLIC PNL TOTAL CA: CPT

## 2021-12-28 PROCEDURE — 99232 SBSQ HOSP IP/OBS MODERATE 35: CPT | Performed by: INTERNAL MEDICINE

## 2021-12-28 PROCEDURE — 99024 POSTOP FOLLOW-UP VISIT: CPT | Performed by: PODIATRIST

## 2021-12-28 PROCEDURE — 86901 BLOOD TYPING SEROLOGIC RH(D): CPT | Performed by: INTERNAL MEDICINE

## 2021-12-28 PROCEDURE — 86850 RBC ANTIBODY SCREEN: CPT | Performed by: INTERNAL MEDICINE

## 2021-12-28 PROCEDURE — 82948 REAGENT STRIP/BLOOD GLUCOSE: CPT

## 2021-12-28 PROCEDURE — 90935 HEMODIALYSIS ONE EVALUATION: CPT | Performed by: INTERNAL MEDICINE

## 2021-12-28 PROCEDURE — 86923 COMPATIBILITY TEST ELECTRIC: CPT

## 2021-12-28 PROCEDURE — 85025 COMPLETE CBC W/AUTO DIFF WBC: CPT

## 2021-12-28 PROCEDURE — 86900 BLOOD TYPING SEROLOGIC ABO: CPT | Performed by: INTERNAL MEDICINE

## 2021-12-28 PROCEDURE — P9040 RBC LEUKOREDUCED IRRADIATED: HCPCS

## 2021-12-28 PROCEDURE — 5A1D70Z PERFORMANCE OF URINARY FILTRATION, INTERMITTENT, LESS THAN 6 HOURS PER DAY: ICD-10-PCS | Performed by: INTERNAL MEDICINE

## 2021-12-28 PROCEDURE — 99223 1ST HOSP IP/OBS HIGH 75: CPT

## 2021-12-28 RX ADMIN — INSULIN GLARGINE 20 UNITS: 100 INJECTION, SOLUTION SUBCUTANEOUS at 21:23

## 2021-12-28 RX ADMIN — SERTRALINE HYDROCHLORIDE 75 MG: 50 TABLET ORAL at 08:50

## 2021-12-28 RX ADMIN — LEVOTHYROXINE SODIUM 50 MCG: 50 TABLET ORAL at 08:50

## 2021-12-28 RX ADMIN — INSULIN LISPRO 2 UNITS: 100 INJECTION, SOLUTION INTRAVENOUS; SUBCUTANEOUS at 08:57

## 2021-12-28 RX ADMIN — INSULIN LISPRO 2 UNITS: 100 INJECTION, SOLUTION INTRAVENOUS; SUBCUTANEOUS at 17:24

## 2021-12-28 RX ADMIN — GABAPENTIN 100 MG: 100 CAPSULE ORAL at 20:59

## 2021-12-28 RX ADMIN — HYDROMORPHONE HYDROCHLORIDE 1 MG: 1 INJECTION, SOLUTION INTRAMUSCULAR; INTRAVENOUS; SUBCUTANEOUS at 20:58

## 2021-12-28 RX ADMIN — OXYCODONE HYDROCHLORIDE AND ACETAMINOPHEN 2 TABLET: 5; 325 TABLET ORAL at 08:50

## 2021-12-28 RX ADMIN — INSULIN LISPRO 3 UNITS: 100 INJECTION, SOLUTION INTRAVENOUS; SUBCUTANEOUS at 08:56

## 2021-12-28 RX ADMIN — PANTOPRAZOLE SODIUM 40 MG: 40 TABLET, DELAYED RELEASE ORAL at 05:15

## 2021-12-28 RX ADMIN — DICLOFENAC SODIUM 2 G: 10 GEL TOPICAL at 17:22

## 2021-12-28 RX ADMIN — SEVELAMER HYDROCHLORIDE 1600 MG: 800 TABLET, FILM COATED PARENTERAL at 08:50

## 2021-12-28 RX ADMIN — TORSEMIDE 100 MG: 20 TABLET ORAL at 13:37

## 2021-12-28 RX ADMIN — HYDROMORPHONE HYDROCHLORIDE 1 MG: 1 INJECTION, SOLUTION INTRAMUSCULAR; INTRAVENOUS; SUBCUTANEOUS at 13:33

## 2021-12-28 RX ADMIN — ALPRAZOLAM 0.25 MG: 0.25 TABLET ORAL at 08:54

## 2021-12-28 RX ADMIN — CARVEDILOL 25 MG: 25 TABLET, FILM COATED ORAL at 17:21

## 2021-12-28 RX ADMIN — HEPARIN SODIUM 5000 UNITS: 5000 INJECTION INTRAVENOUS; SUBCUTANEOUS at 20:58

## 2021-12-28 RX ADMIN — ACETAMINOPHEN 650 MG: 325 TABLET, FILM COATED ORAL at 17:21

## 2021-12-28 RX ADMIN — NIFEDIPINE 30 MG: 30 TABLET, FILM COATED, EXTENDED RELEASE ORAL at 13:35

## 2021-12-28 RX ADMIN — WARFARIN SODIUM 9 MG: 7.5 TABLET ORAL at 17:28

## 2021-12-28 RX ADMIN — VANCOMYCIN HYDROCHLORIDE 750 MG: 750 INJECTION, SOLUTION INTRAVENOUS at 11:43

## 2021-12-28 RX ADMIN — GABAPENTIN 100 MG: 100 CAPSULE ORAL at 17:22

## 2021-12-28 RX ADMIN — DICLOFENAC SODIUM 2 G: 10 GEL TOPICAL at 21:23

## 2021-12-28 RX ADMIN — DICLOFENAC SODIUM 2 G: 10 GEL TOPICAL at 13:36

## 2021-12-28 RX ADMIN — Medication 3 MG: at 21:04

## 2021-12-28 RX ADMIN — SEVELAMER HYDROCHLORIDE 1600 MG: 800 TABLET, FILM COATED PARENTERAL at 13:34

## 2021-12-28 RX ADMIN — INSULIN LISPRO 3 UNITS: 100 INJECTION, SOLUTION INTRAVENOUS; SUBCUTANEOUS at 17:25

## 2021-12-28 NOTE — PLAN OF CARE
Post-Dialysis RN Treatment Note    Blood Pressure:  Pre 171/93 mm/Hg  Post 148/66 mmHg     kg    Weight: patient arrived to unit in w/c, unable to stand for weight s/p transmetatarsal amputation   Mode of weight measurement: N/A   Volume Removed  3000 ml    Treatment duration 180 minutes    NS given  No    Treatment shortened? Yes, 30 min per patient request, Dr Rafia Garcia aware  Medications given during Rx Vancomycin 750 mg   Estimated Kt/V     Access type: AV fistula   Access Issues: No    Report called to primary nurse   Yes  Lazarus Mixon RN aware patient hgb 6 4 today and reports he will give blood per order  Dr Rafia Garcia also made aware  Pre HD: Patient requested 3 hour HD treatment with 3 kg fluid removal as tolerated      Problem: METABOLIC, FLUID AND ELECTROLYTES - ADULT  Goal: Electrolytes maintained within normal limits  Description: INTERVENTIONS:  - Monitor labs and assess patient for signs and symptoms of electrolyte imbalances  - Administer electrolyte replacement as ordered  - Monitor response to electrolyte replacements, including repeat lab results as appropriate  - Instruct patient on fluid and nutrition as appropriate  Outcome: Progressing  Goal: Fluid balance maintained  Description: INTERVENTIONS:  - Monitor labs   - Monitor I/O and WT  - Instruct patient on fluid and nutrition as appropriate  - Assess for signs & symptoms of volume excess or deficit  Outcome: Progressing

## 2021-12-28 NOTE — QUICK NOTE
PMR Quick Note:    PMR Consulting provider - patient appears appropriate for Acute Rehab setting pending facility acceptance  [facility] PMR Med Director - thus far has not approved patient to [facility], [facility] - citing concerns that patient has been non-compliant with WB status but would reconsider if patient improves compliance  Continue to encourage patient's compliance and improved activity tolerance  In interim, CM can submit to other acute rehab settings

## 2021-12-28 NOTE — PROGRESS NOTES
Podiatry - Progress Note  Patient: Roxanna Mcardle 49 y o  female   MRN: [id number]  PCP: Andry Schroeder MD  Unit/Bed#: [facility] 933-01 Encounter: [id number]  Date Of Visit: 21    ASSESSMENT:    Roxanna Mcardle is a 49 y o  female with:    1  Right medial 1st MTPJ diabetic ulceration- [place] 3 - POA              -s/p R TMA (DOS: 21)  2  T2DM  3  ESRD on HD  4  Obesity    PLAN:    · First post-surgical dressing change today, POD2  TMA incision site stable with all sutures intact, no acute clinical signs of infection noted  · Due to patient history of non-compliance and recent reports of her walking on surgical foot, podiatry strongly recommends short-term rehab for patient to properly heal surgical site  · Plan for pRBC tranfusion today per primary,  · Pain is well controlled  Continue current pain management regimen  · Elevation on green foam wedges or pillows when non-ambulatory  · Rest of care per primary team     Weightbearing status: STRICT NWB to RLE       SUBJECTIVE:     The patient was seen, evaluated, and assessed at bedside today  The patient was awake, alert, and in no acute distress  The patient reports moderate, 5/10 pain at this time  Pain is well controlled with current pain management regimen  Patient reports normal appetite and using the restroom postoperatively  Patient denies N/V/F/chills/SOB/CP  OBJECTIVE:     Vitals:   /78   Pulse 68   Temp 97 8 °F (36 6 °C)   Resp 20   Ht 5' 6" (1 676 m)   Wt 122 kg (268 lb 15 4 oz)   LMP 2016 (Exact Date)   SpO2 97%   BMI 43 41 kg/m²     Temp (24hrs), Av 3 °F (36 8 °C), Min:97 8 °F (36 6 °C), Max:98 8 °F (37 1 °C)      Physical Exam:     General:  Alert, cooperative, and in no distress  Lower extremity exam:  Cardiovascular status at baseline  Neurological status at baseline  Musculoskeletal status at baseline  Lower extremity temperature WNL       R TMA surgical site stable with skin edges well aligned and coapted, all sutures intact  Capillary refill <3 seconds at surrounding incision site skin  No signs of active infection: no purulence, no malodor, no ascending erythema, no crepitus, no fluctuance, no drainage  Additional Data:     Labs:    Results from last 7 days   Lab Units 12/27/21  0650   WBC Thousand/uL 9 42   HEMOGLOBIN g/dL 7 0*   HEMATOCRIT % 21 5*   PLATELETS Thousands/uL 250   NEUTROS PCT % 70   LYMPHS PCT % 15   MONOS PCT % 7   EOS PCT % 6     Results from last 7 days   Lab Units 12/27/21  0650 12/22/21  0543 12/21/21  0842   POTASSIUM mmol/L 4 5   < > 4 5   CHLORIDE mmol/L 103   < > 103   CO2 mmol/L 25   < > 25   BUN mg/dL 36*   < > 51*   CREATININE mg/dL 6 11*   < > 8 84*   CALCIUM mg/dL 8 2*   < > 8 2*   ALK PHOS U/L  --   --  141*   ALT U/L  --   --  14   AST U/L  --   --  12    < > = values in this interval not displayed  Results from last 7 days   Lab Units 12/27/21  0650   INR  1 14       * I Have Reviewed All Lab Data Listed Above  Recent Cultures (last 7 days):               Imaging: I have personally reviewed pertinent films in PACS  EKG, Pathology, and Other Studies: I have personally reviewed pertinent reports  ** Please Note: Portions of the record may have been created with voice recognition software  Occasional wrong word or "sound a like" substitutions may have occurred due to the inherent limitations of voice recognition software  Read the chart carefully and recognize, using context, where substitutions have occurred   **

## 2021-12-28 NOTE — PROGRESS NOTES
Progress Note - Infectious Disease   Farrukh Banks 49 y o  female MRN: [id number]  Unit/Bed#: [facility] 933-01 Encounter: [id number]      Impression/Plan:  1   Right foot cellulitis   Due to #2   Blood cultures negative   Clinically stable without sepsis   Improving  Rec:  · Continue antibiotics as below  · Follow temperatures and WBC closely  · Supportive care as per the primary service     2   Chronic right foot DM ulcer with 1st MT/toe osteomyelitis   In setting of #3   PTB by Podiatry  Chantal Resendiz, MRI show bone infection   Wound culture with MRSA  Patient is status post TMA with a surgical cure  Rec:  · Continue vancomycin for now through the weekend  · Pharmacy consult for vancomycin dosing  · Patient for dressing change today  · If no remaining soft tissue infection at dressing change discontinue antibiotics  · If still remaining soft tissue infection, changed to doxycycline 100 mg p o  Q 12 hours to complete 7 days postoperatively     3   Recent 1st MTPJ septic arthritis   2021  Status post washout   Fluid aspirate with MRSA, OR cultures with Enterococcus, lactobacillus, Clostridium   Status post 6 weeks IV antibiotics through 74/3   Now complicated by above      4   Poorly controlled DM with DPN    Recent A1c 2021 7 9   Risk factor for above      5   ESRD on HD  Nicholas Evangelista left AVF  Rec:  · Dose adjust antibiotics for HD  · Nephrology follow-up ongoing    Discussed the above management plan with the primary service    Antibiotics:  Vancomycin 7  Postop day 2    Subjective:  Patient has no fever, chills, sweats; no nausea, vomiting, diarrhea; no cough, shortness of breath; no pain  No new symptoms      Objective:  Vitals:  Temp:  [97 7 °F (36 5 °C)-98 8 °F (37 1 °C)] 97 7 °F (36 5 °C)  HR:  [68-82] 77  Resp:  [16-20] 18  BP: ()/(58-93) 139/58  SpO2:  [97 %-100 %] 97 %  Temp (24hrs), Av 1 °F (36 7 °C), Min:97 7 °F (36 5 °C), Max:98 8 °F (37 1 °C)  Current: Temperature: 97 7 °F (36 5 °C)    Physical Exam:   General Appearance:  Alert, interactive, nontoxic, no acute distress  Throat: Oropharynx moist without lesions  Lungs:   Clear to auscultation bilaterally; no wheezes, rhonchi or rales; respirations unlabored   Heart:  RRR; no murmur, rub or gallop   Abdomen:   Soft, non-tender, non-distended, positive bowel sounds  Extremities: No clubbing, cyanosis  Right foot heavily dressed with a dry dressing in place   Skin: No new rashes or lesions  No draining wounds noted  Labs, Imaging, & Other studies:   All pertinent labs and imaging studies were personally reviewed  Results from last 7 days   Lab Units 12/28/21  0931 12/27/21  0650 12/26/21  0823   WBC Thousand/uL 9 02 9 42 6 78   HEMOGLOBIN g/dL 6 4* 7 0* 7 6*   PLATELETS Thousands/uL 210 250 224     Results from last 7 days   Lab Units 12/28/21  0931 12/27/21  0650 12/27/21  0650 12/26/21  1501 12/26/21  1501   SODIUM mmol/L 133*  --  136  --  135*   POTASSIUM mmol/L 4 7   < > 4 5   < > 4 2   CHLORIDE mmol/L 101   < > 103   < > 101   CO2 mmol/L 23   < > 25   < > 27   BUN mg/dL 48*   < > 36*   < > 30*   CREATININE mg/dL 8 17*   < > 6 11*   < > 4 78*   EGFR ml/min/1 73sq m 5   < > 7   < > 9   CALCIUM mg/dL 8 8   < > 8 2*   < > 8 2*    < > = values in this interval not displayed

## 2021-12-28 NOTE — ASSESSMENT & PLAN NOTE
· HD every T/Th/Sat pre nephrology  · S/p UF for volume overload  · Nephrology following and appreciate their input  Unknown if ever smoked

## 2021-12-28 NOTE — PROGRESS NOTES
Vancomycin IV Pharmacy-to-Dose Consultation    Mitch Rodrigez is a 49 y o  female who is currently receiving Vancomycin IV with management by the Pharmacy Consult service  Assessment/Plan:  The patient was reviewed  Renal function is stable and no signs or symptoms of nephrotoxicity and/or infusion reactions were documented in the chart  Based on todays assessment, continue current vancomycin (day # 8) dosing of 750 mg after hd, today, with a plan for trough to be drawn at amdraw on dec30  We will continue to follow the patients culture results and clinical progress daily      Susen Lombard, Pharmacist

## 2021-12-28 NOTE — PROGRESS NOTES
[facility]  Progress Note Marce Strong 1965, 49 y o  female MRN: [id number]  Unit/Bed#: [facility] 933-01 Encounter: [id number]  Primary Care Provider: Michelle Michael MD   Date and time admitted to hospital: 12/20/2021  3:07 PM    Essential hypertension  Assessment & Plan  Continue Coreg/Demadex/Procardia XL - PRN IV Hydralazine on board for BP spikes  Low-sodium diet  Optimize pain control    Insulin-dependent diabetes mellitus with neuropathy  Assessment & Plan  Lab Results   Component Value Date    HGBA1C 7 9 (H) 09/30/2021     Continue basal/prandial insulin w/ additional SSI coverage per Accu-Cheks  Carbohydrate restricted diet  Continue Gabapentin for neuropathy    Hyperlipidemia  Assessment & Plan  Continue Lipitor    Morbid obesity  Assessment & Plan  BMI of 43 41  Lifestyle/diet modifications    Anemia of chronic disease  Assessment & Plan  Monitor H/H    ESRD on hemodialysis  Assessment & Plan  Routine hemodialysis per nephrology  Continue Renagel/Sensipar supplementation    History of venous thromboembolism  Assessment & Plan  Discussed with podiatry today -> warfarin rsumed  Target INR of 2-3    * Right foot ulceration with osteomyelitis  Assessment & Plan  Plan XR foot noting "Bone destruction involving the medial aspect of the distal 1st metatarsal compatible with acute osteomyelitis"  Follow-up MRI noted to be "consistent with osteomyelitis of the 1st metatarsal and proximal phalanx"  Appreciate infectious disease input -> c/w IV Vancomycin regimen after dialysis sessions -> can possibly discontinue antibiotic postoperatively if surgical cure attained  Discussed with podiatry -> S/P transmetatarsal amputation yesterday  PRN pain control - supportive care otherwise  Await physiatry evaluation - continue PT/OT    -patient completed antibiotic treatments, pending discharge to acute rehab        VTE Pharmacologic Prophylaxis:   High Risk (Score >/= 5) - Pharmacological DVT Prophylaxis Ordered: warfarin (Coumadin)  Sequential Compression Devices Ordered  Patient Centered Rounds: I performed bedside rounds with nursing staff today  Discussions with Specialists or Other Care Team Provider: n/a    Education and Discussions with Family / Patient: Patient declined call to   Time Spent for Care: 30 minutes  More than 50% of total time spent on counseling and coordination of care as described above  Current Length of Stay: 8 day(s)  Current Patient Status: Inpatient   Certification Statement: The patient will continue to require additional inpatient hospital stay due to Pending placement to acute rehab  Discharge Plan: Anticipate discharge in 24-48 hrs to rehab facility  Code Status: Level 1 - Full Code    Subjective:   Patient denies any significant events overnight, denies any fevers chills nausea vomiting, tolerating oral intake  Objective:     Vitals:   Temp (24hrs), Av 9 °F (36 6 °C), Min:97 6 °F (36 4 °C), Max:98 4 °F (36 9 °C)    Temp:  [97 6 °F (36 4 °C)-98 4 °F (36 9 °C)] 98 4 °F (36 9 °C)  HR:  [68-92] 84  Resp:  [16-20] 18  BP: (100-171)/(58-93) 100/59  SpO2:  [90 %-97 %] 90 %  Body mass index is 43 41 kg/m²  Input and Output Summary (last 24 hours): Intake/Output Summary (Last 24 hours) at 2021 1709  Last data filed at 2021 1240  Gross per 24 hour   Intake 980 ml   Output 3500 ml   Net -2520 ml       Physical Exam:   Physical Exam  Vitals and nursing note reviewed  Constitutional:       General: She is not in acute distress  Appearance: She is well-developed  She is not ill-appearing, toxic-appearing or diaphoretic  HENT:      Head: Normocephalic and atraumatic  Eyes:      General: No scleral icterus  Conjunctiva/sclera: Conjunctivae normal    Cardiovascular:      Rate and Rhythm: Normal rate and regular rhythm  Heart sounds: No murmur heard  No friction rub  No gallop      Pulmonary: Effort: Pulmonary effort is normal  No respiratory distress  Breath sounds: Normal breath sounds  No stridor  No wheezing, rhonchi or rales  Chest:      Chest wall: No tenderness  Abdominal:      General: There is no distension  Palpations: Abdomen is soft  There is no mass  Tenderness: There is no abdominal tenderness  There is no guarding or rebound  Hernia: No hernia is present  Musculoskeletal:         General: No swelling, tenderness, deformity or signs of injury  Cervical back: Neck supple  Right lower leg: No edema  Left lower leg: No edema  Comments: Right foot with dressing in place clean dry intact   Skin:     General: Skin is warm and dry  Coloration: Skin is not jaundiced or pale  Findings: No bruising, erythema, lesion or rash  Neurological:      Mental Status: She is alert and oriented to person, place, and time            Additional Data:     Labs:  Results from last 7 days   Lab Units 12/28/21  0931   WBC Thousand/uL 9 02   HEMOGLOBIN g/dL 6 4*   HEMATOCRIT % 20 2*   PLATELETS Thousands/uL 210   NEUTROS PCT % 73   LYMPHS PCT % 12*   MONOS PCT % 9   EOS PCT % 5     Results from last 7 days   Lab Units 12/28/21  0931   SODIUM mmol/L 133*   POTASSIUM mmol/L 4 7   CHLORIDE mmol/L 101   CO2 mmol/L 23   BUN mg/dL 48*   CREATININE mg/dL 8 17*   ANION GAP mmol/L 9   CALCIUM mg/dL 8 8   GLUCOSE RANDOM mg/dL 242*     Results from last 7 days   Lab Units 12/28/21  0931   INR  1 10     Results from last 7 days   Lab Units 12/28/21  1321 12/28/21  0855 12/27/21  2131 12/27/21  1614 12/27/21  1151 12/27/21  0738 12/26/21  2056 12/26/21  1602 12/26/21  0736 12/25/21  2111 12/25/21  1705 12/25/21  1647   POC GLUCOSE mg/dl 155* 246* 146* 215* 214* 132 230* 96 159* 264* 60* 49*               Lines/Drains:  Invasive Devices  Report    Peripheral Intravenous Line            Peripheral IV Right;Ventral (anterior) Hand -- days          Line            Hemodialysis AV Fistula 02/20/18 Left Forearm 1407 days                      Imaging: No pertinent imaging reviewed      Recent Cultures (last 7 days):         Last 24 Hours Medication List:   Current Facility-Administered Medications   Medication Dose Route Frequency Provider Last Rate    acetaminophen  650 mg Oral Q6H PRN Lendia Medal, DPM      albuterol  2 puff Inhalation Q6H PRN Lendia Medal, DPM      ALPRAZolam  0 25 mg Oral 4x Daily PRN Lendia Medal, DPM      ammonium lactate   Topical BID PRN Lendia Medal, DPM      calcium carbonate  500 mg Oral TID PRN Fabiana Chavarria MD      carvedilol  25 mg Oral BID With Meals Lendia Medal, DPM      cinacalcet  30 mg Oral Daily Lendia Medal, [place]      Diclofenac Sodium  2 g Topical 4x Daily Fabiana Chavarria MD      fentaNYL  25 mcg Intravenous Q5 Min PRN Yoselin King CRNA      gabapentin  100 mg Oral TID Lendia Medal, DPM      heparin (porcine)  5,000 Units Subcutaneous Q12H [facility] Lendia Medal, [place]      hydrALAZINE  5 mg Intravenous Q6H PRN Lendia Medal, DPM      HYDROmorphone  1 mg Intravenous Q3H PRN Fabiana Chavarria MD      insulin glargine  20 Units Subcutaneous HS Lendia Medal, DPM      insulin lispro  1-5 Units Subcutaneous TID [facility] Lendia Medal, DPM      insulin lispro  3 Units Subcutaneous TID With Meals Lendia Medal, DPM      levothyroxine  50 mcg Oral Daily Lendia Medal, DPM      lidocaine  1 patch Topical Daily Fabiana Chavarria MD      melatonin  3 mg Oral HS Lendia Medal, DPM      metoclopramide  10 mg Intravenous Q6H PRN Melita Beckwith PA-C      NIFEdipine  30 mg Oral Daily Lendia Medal, [place]      nystatin   Topical BID Lendia Medal, DPM      ondansetron  4 mg Intravenous Once PRN Yoselin King CRNA      oxyCODONE-acetaminophen  1 tablet Oral Q6H PRN Lendia Medal, DPM      oxyCODONE-acetaminophen  2 tablet Oral Q6H PRN Fabiana Chavarria MD      pantoprazole  40 mg Oral Early Morning Lendia Medal, DPM      polyethylene glycol  17 g Oral Daily PRN Leane Pacer, DPM      senna  2 tablet Oral HS PRN Leane Pacer, DPM      sertraline  75 mg Oral Daily [facility] Pacer [place]      sevelamer  1,600 mg Oral TID With Meals Leane Pacer, DPM      torsemide  100 mg Oral Daily [facility] Pac[facility] [place]      vancomycin  750 mg Intravenous After Dialysis Jacob Suarez MD Stopped (12/28/21 1240)    warfarin  9 mg Oral Once per day on Mon Tue Wed Thu Fri Sat Meg Castro MD          Today, Patient Was Seen By: Candy Sullivan DO    **Please Note: This note may have been constructed using a voice recognition system  **

## 2021-12-28 NOTE — ASSESSMENT & PLAN NOTE
Plan XR foot noting "Bone destruction involving the medial aspect of the distal 1st metatarsal compatible with acute osteomyelitis"  Follow-up MRI noted to be "consistent with osteomyelitis of the 1st metatarsal and proximal phalanx"  Appreciate infectious disease input -> c/w IV Vancomycin regimen after dialysis sessions -> can possibly discontinue antibiotic postoperatively if surgical cure attained  Discussed with podiatry -> S/P transmetatarsal amputation yesterday  PRN pain control - supportive care otherwise  Await physiatry evaluation - continue PT/OT    -patient completed antibiotic treatments, pending discharge to acute rehab

## 2021-12-28 NOTE — PROCEDURES
NEPHROLOGY DIALYSIS PROCEDURE NOTE      Patient seen and examined on Hemodialysis, tolerating procedure well  All documentation, labs, medications were reviewed by myself, and the treatment plan was reviewed with nurse and patient  Seen on Dialysis at : 10:11 AM  Dialysis Access: Left Arm AV Fistula  Vitals:  128/73, 97 7°, respiratory rate 16, heart rate 71 beats per minute  Dialysis time: 3 hours,   Dialyzer: F180  Sodium bath: 138  Potassium bath: 3 K+  Bicarbonate bath: 35  Calcium bath: 2 5  Ultrafiltration: Aim for EDW  Blood flow rate: 400 cc/min  Dialysis flow rate: 1 5 X Qb  Dialysis temperature: 35C  Medications given on HD:  Vancomycin after dialysis    Assessment/Plan:    45-year-old female with a history of end-stage renal disease on hemodialysis, hypertension, anemia of chronic kidney disease who presents for right foot ulcer which was found to be osteomyelitis  Nephrology consult for ESRD management      ASSESSMENT and PLAN:     End-stage kidney disease  --[facility]center hemodialysis Tuesday Thursday Saturday at [address]  --access:  Left arm AV fistula  --plan for next dialysis on Thursday     Anemia of chronic kidney disease  --transfuse for hemoglobin less than 7  --hemoglobin stable below goal  --not on MITCHELL due to history of pulmonary embolism     Hypertension  --carvedilol 25 mg twice a day, nifedipine 30 mg daily, restarted back on torsemide 100 mg daily  --dry weight 121 1 kg     [place] bone disorder-chronic kidney disease  --continue sevelamer 1600 mg t i d  With meals, Sensipar 30 mg daily  --renal diet     Right foot osteomyelitis  --status post right amputation the transmetatarsal  --vancomycin on hemodialysis    Review of Systems: The entire 12 point ROS has been reviewed  Physical Exam:    General:  Awake, no acute distress  Skin:  No rashes no lesions  CVS:  S1-S2 appreciated regular rate rhythm  Lungs:  Clear to auscultation  Abdomen:  Nontender nondistended  Access:   Audible bruit and palpable thrill  Extremities:  Right leg is wrapped  Neuro:  No asterixis          Current Facility-Administered Medications:     acetaminophen (TYLENOL) tablet 650 mg, 650 mg, Oral, Q6H PRN, Lynn Asai, DPM, 650 mg at 12/26/21 1739    albuterol (PROVENTIL HFA,VENTOLIN HFA) inhaler 2 puff, 2 puff, Inhalation, Q6H PRN, Lynn Asai, DPM    ALPRAZolam Kiera Laser) tablet 0 25 mg, 0 25 mg, Oral, 4x Daily PRN, Lynn Asai, DPM, 0 25 mg at 12/28/21 0854    ammonium lactate (LAC-HYDRIN) 12 % cream, , Topical, BID PRN, Lynn Asai, DPM, Given at 12/21/21 2207    calcium carbonate (TUMS) chewable tablet 500 mg, 500 mg, Oral, TID PRN, Stephanie Marina MD, 500 mg at 12/27/21 1743    carvedilol (COREG) tablet 25 mg, 25 mg, Oral, BID With Meals, Lynn Asai, DPM, 25 mg at 12/27/21 1740    cinacalcet (SENSIPAR) tablet 30 mg, 30 mg, Oral, Daily, Lynn Asai, DPM, 30 mg at 12/27/21 1145    Diclofenac Sodium (VOLTAREN) 1 % topical gel 2 g, 2 g, Topical, 4x Daily, Stephanie Marina MD, 2 g at 12/27/21 2201    fentaNYL (SUBLIMAZE) injection 25 mcg, 25 mcg, Intravenous, Q5 Min PRN, Kelly Akbar CRNA    gabapentin (NEURONTIN) capsule 100 mg, 100 mg, Oral, TID, Lynn Asai, DPM, 100 mg at 12/27/21 2029    heparin (porcine) subcutaneous injection 5,000 Units, 5,000 Units, Subcutaneous, Q12H [address], Lynn Asai, DPM, 5,000 Units at 12/27/21 2028    hydrALAZINE (APRESOLINE) injection 5 mg, 5 mg, Intravenous, Q6H PRN, Lynn Asai, DPM, 5 mg at 12/21/21 0047    HYDROmorphone (DILAUDID) injection 1 mg, 1 mg, Intravenous, Q3H PRN, Stephanie Marina MD, 1 mg at 12/27/21 2029    insulin glargine (LANTUS) subcutaneous injection 20 Units 0 2 mL, 20 Units, Subcutaneous, HS, Arturo Helm DPM, 20 Units at 12/27/21 2200    insulin lispro (HumaLOG) 100 units/mL subcutaneous injection 1-5 Units, 1-5 Units, Subcutaneous, TID AC, 2 Units at 12/28/21 0857 **AND** Fingerstick Glucose (POCT), , , TID AC, Arturo Helm DPM   insulin lispro (HumaLOG) 100 units/mL subcutaneous injection 3 Units, 3 Units, Subcutaneous, TID With Meals, Jeanne Velasquez, DPM, 3 Units at 12/28/21 0856    levothyroxine tablet 50 mcg, 50 mcg, Oral, Daily, Jeanne Eva, DPM, 50 mcg at 12/28/21 0850    lidocaine (LIDODERM) 5 % patch 1 patch, 1 patch, Topical, Daily, Octaviano Wade MD, 1 patch at 12/27/21 1436    melatonin tablet 3 mg, 3 mg, Oral, HS, Jeanne Eva, DPM, 3 mg at 12/27/21 2029    metoclopramide (REGLAN) injection 10 mg, 10 mg, Intravenous, Q6H PRN, Melita Beckwith PA-C    NIFEdipine (PROCARDIA XL) 24 hr tablet 30 mg, 30 mg, Oral, Daily, Jeanneaustin Velasquez, DPM, 30 mg at 12/27/21 1145    nystatin (MYCOSTATIN) powder, , Topical, BID, Jeanne Velasquez, DPM, Given at 12/25/21 1715    ondansetron [facility]) injection 4 mg, 4 mg, Intravenous, Once PRN, Crystal Lopez CRNA    oxyCODONE-acetaminophen (PERCOCET) 5-325 mg per tablet 1 tablet, 1 tablet, Oral, Q6H PRN, Jeanne Velasquez, DPM, 1 tablet at 12/27/21 1145    oxyCODONE-acetaminophen (PERCOCET) 5-325 mg per tablet 2 tablet, 2 tablet, Oral, Q6H PRN, Octaviano Wade MD, 2 tablet at 12/28/21 0850    pantoprazole (PROTONIX) EC tablet 40 mg, 40 mg, Oral, Early Morning, Jeanne Velasquez, DPM, 40 mg at 12/28/21 0515    polyethylene glycol (MIRALAX) packet 17 g, 17 g, Oral, Daily PRN, Jeanne Velasquez, DPM    senna (SENOKOT) tablet 17 2 mg, 2 tablet, Oral, HS PRN, Jeanne Velasquez, DPM    sertraline (ZOLOFT) tablet 75 mg, 75 mg, Oral, Daily, Jeanne Velasquez DPM, 75 mg at 12/28/21 0850    sevelamer (RENAGEL) tablet 1,600 mg, 1,600 mg, Oral, TID With Meals, Jeanne Eva, DPM, 1,600 mg at 12/28/21 0850    torsemide (DEMADEX) tablet 100 mg, 100 mg, Oral, Daily, Jeanne Eva, DPM, 100 mg at 12/27/21 0831    vancomycin (VANCOCIN) IVPB (premix in dextrose) 750 mg 150 mL, 750 mg, Intravenous, After Dialysis, Jennifer Billy MD    warfarin (COUMADIN) tablet 9 mg, 9 mg, Oral, Once per day on Mon Tue Wed Thu Fri Sat, Octaviano Wade, MD, 9 mg at 12/27/21 5212

## 2021-12-28 NOTE — ARC ADMISSION
Reviewed patient's case with [place] physician - patient is recommended for SNF/subacute rehab for longer, slower paced therapy needs, as patient continues to be noncompliant with NWB status to RLE despite education  CM has been updated

## 2021-12-28 NOTE — PLAN OF CARE
Problem: Potential for Falls  Goal: Patient will remain free of falls  Description: INTERVENTIONS:  - Educate patient/family on patient safety including physical limitations  - Instruct patient to call for assistance with activity   - Consult OT/PT to assist with strengthening/mobility   - Keep Call bell within reach  - Keep bed low and locked with side rails adjusted as appropriate  - Keep care items and personal belongings within reach  - Initiate and maintain comfort rounds  - Make Fall Risk Sign visible to staff  - Offer Toileting every  Hours, in advance of need  - Initiate/Maintain alarm  - Obtain necessary fall risk management equipment:   - Apply yellow socks and bracelet for high fall risk patients  - Consider moving patient to room near nurses station  Outcome: Progressing     Problem: METABOLIC, FLUID AND ELECTROLYTES - ADULT  Goal: Electrolytes maintained within normal limits  Description: INTERVENTIONS:  - Monitor labs and assess patient for signs and symptoms of electrolyte imbalances  - Administer electrolyte replacement as ordered  - Monitor response to electrolyte replacements, including repeat lab results as appropriate  - Instruct patient on fluid and nutrition as appropriate  Outcome: Progressing  Goal: Fluid balance maintained  Description: INTERVENTIONS:  - Monitor labs   - Monitor I/O and WT  - Instruct patient on fluid and nutrition as appropriate  - Assess for signs & symptoms of volume excess or deficit  Outcome: Progressing     Problem: PAIN - ADULT  Goal: Verbalizes/displays adequate comfort level or baseline comfort level  Description: Interventions:  - Encourage patient to monitor pain and request assistance  - Assess pain using appropriate pain scale  - Administer analgesics based on type and severity of pain and evaluate response  - Implement non-pharmacological measures as appropriate and evaluate response  - Consider cultural and social influences on pain and pain management  - Notify physician/advanced practitioner if interventions unsuccessful or patient reports new pain  Outcome: Progressing     Problem: INFECTION - ADULT  Goal: Absence or prevention of progression during hospitalization  Description: INTERVENTIONS:  - Assess and monitor for signs and symptoms of infection  - Monitor lab/diagnostic results  - Monitor all insertion sites, i e  indwelling lines, tubes, and drains  - Monitor endotracheal if appropriate and nasal secretions for changes in amount and color  - [place] appropriate cooling/warming therapies per order  - Administer medications as ordered  - Instruct and encourage patient and family to use good hand hygiene technique  - Identify and instruct in appropriate isolation precautions for identified infection/condition  Outcome: Progressing  Goal: Absence of fever/infection during neutropenic period  Description: INTERVENTIONS:  - Monitor WBC    Outcome: Progressing     Problem: SAFETY ADULT  Goal: Patient will remain free of falls  Description: INTERVENTIONS:  - Educate patient/family on patient safety including physical limitations  - Instruct patient to call for assistance with activity   - Consult OT/PT to assist with strengthening/mobility   - Keep Call bell within reach  - Keep bed low and locked with side rails adjusted as appropriate  - Keep care items and personal belongings within reach  - Initiate and maintain comfort rounds  - Make Fall Risk Sign visible to staff  - Offer Toileting every  Hours, in advance of need  - Initiate/Maintain alarm  - Obtain necessary fall risk management equipment:   - Apply yellow socks and bracelet for high fall risk patients  - Consider moving patient to room near nurses station  Outcome: Progressing  Goal: Maintain or return to baseline ADL function  Description: INTERVENTIONS:  -  Assess patient's ability to carry out ADLs; assess patient's baseline for ADL function and identify physical deficits which impact ability to perform ADLs (bathing, care of mouth/teeth, toileting, grooming, dressing, etc )  - Assess/evaluate cause of self-care deficits   - Assess range of motion  - Assess patient's mobility; develop plan if impaired  - Assess patient's need for assistive devices and provide as appropriate  - Encourage maximum independence but intervene and supervise when necessary  - Involve family in performance of ADLs  - Assess for home care needs following discharge   - Consider OT consult to assist with ADL evaluation and planning for discharge  - Provide patient education as appropriate  Outcome: Progressing  Goal: Maintains/Returns to pre admission functional level  Description: INTERVENTIONS:  - Perform BMAT or MOVE assessment daily    - Set and communicate daily mobility goal to care team and patient/family/caregiver  - Collaborate with rehabilitation services on mobility goals if consulted  - Perform Range of Motion  times a day  - Reposition patient every  hours    - Dangle patient  times a day  - Stand patient  times a day  - Ambulate patient  times a day  - Out of bed to chair  times a day   - Out of bed for meals  times a day  - Out of bed for toileting  - Record patient progress and toleration of activity level   Outcome: Progressing     Problem: DISCHARGE PLANNING  Goal: Discharge to home or other facility with appropriate resources  Description: INTERVENTIONS:  - Identify barriers to discharge w/patient and caregiver  - Arrange for needed discharge resources and transportation as appropriate  - Identify discharge learning needs (meds, wound care, etc )  - Arrange for interpretive services to assist at discharge as needed  - Refer to Case Management Department for coordinating discharge planning if the patient needs post-hospital services based on physician/advanced practitioner order or complex needs related to functional status, cognitive ability, or social support system  Outcome: Progressing     Problem: Knowledge Deficit  Goal: Patient/family/caregiver demonstrates understanding of disease process, treatment plan, medications, and discharge instructions  Description: Complete learning assessment and assess knowledge base  Interventions:  - Provide teaching at level of understanding  - Provide teaching via preferred learning methods  Outcome: Progressing     Problem: MOBILITY - ADULT  Goal: Maintain or return to baseline ADL function  Description: INTERVENTIONS:  -  Assess patient's ability to carry out ADLs; assess patient's baseline for ADL function and identify physical deficits which impact ability to perform ADLs (bathing, care of mouth/teeth, toileting, grooming, dressing, etc )  - Assess/evaluate cause of self-care deficits   - Assess range of motion  - Assess patient's mobility; develop plan if impaired  - Assess patient's need for assistive devices and provide as appropriate  - Encourage maximum independence but intervene and supervise when necessary  - Involve family in performance of ADLs  - Assess for home care needs following discharge   - Consider OT consult to assist with ADL evaluation and planning for discharge  - Provide patient education as appropriate  Outcome: Progressing  Goal: Maintains/Returns to pre admission functional level  Description: INTERVENTIONS:  - Perform BMAT or MOVE assessment daily    - Set and communicate daily mobility goal to care team and patient/family/caregiver  - Collaborate with rehabilitation services on mobility goals if consulted  - Perform Range of Motion  times a day  - Reposition patient every  hours    - Dangle patient  times a day  - Stand patient  times a day  - Ambulate patient  times a day  - Out of bed to chair  times a day   - Out of bed for meals  times a day  - Out of bed for toileting  - Record patient progress and toleration of activity level   Outcome: Progressing

## 2021-12-29 LAB
ABO GROUP BLD BPU: NORMAL
ANION GAP SERPL CALCULATED.3IONS-SCNC: 5 MMOL/L (ref 4–13)
BASOPHILS # BLD AUTO: 0.04 THOUSANDS/ΜL (ref 0–0.1)
BASOPHILS NFR BLD AUTO: 1 % (ref 0–1)
BPU ID: NORMAL
BUN SERPL-MCNC: 31 MG/DL (ref 5–25)
CALCIUM SERPL-MCNC: 8.9 MG/DL (ref 8.3–10.1)
CHLORIDE SERPL-SCNC: 100 MMOL/L (ref 100–108)
CO2 SERPL-SCNC: 29 MMOL/L (ref 21–32)
CREAT SERPL-MCNC: 5.92 MG/DL (ref 0.6–1.3)
CROSSMATCH: NORMAL
EOSINOPHIL # BLD AUTO: 0.36 THOUSAND/ΜL (ref 0–0.61)
EOSINOPHIL NFR BLD AUTO: 5 % (ref 0–6)
ERYTHROCYTE [DISTWIDTH] IN BLOOD BY AUTOMATED COUNT: 14.9 % (ref 11.6–15.1)
GFR SERPL CREATININE-BSD FRML MDRD: 7 ML/MIN/1.73SQ M
GLUCOSE SERPL-MCNC: 166 MG/DL (ref 65–140)
GLUCOSE SERPL-MCNC: 171 MG/DL (ref 65–140)
GLUCOSE SERPL-MCNC: 209 MG/DL (ref 65–140)
GLUCOSE SERPL-MCNC: 73 MG/DL (ref 65–140)
GLUCOSE SERPL-MCNC: 98 MG/DL (ref 65–140)
HCT VFR BLD AUTO: 20.8 % (ref 34.8–46.1)
HGB BLD-MCNC: 7 G/DL (ref 11.5–15.4)
IMM GRANULOCYTES # BLD AUTO: 0.05 THOUSAND/UL (ref 0–0.2)
IMM GRANULOCYTES NFR BLD AUTO: 1 % (ref 0–2)
INR PPP: 1.13 (ref 0.84–1.19)
LYMPHOCYTES # BLD AUTO: 1.15 THOUSANDS/ΜL (ref 0.6–4.47)
LYMPHOCYTES NFR BLD AUTO: 16 % (ref 14–44)
MCH RBC QN AUTO: 31.1 PG (ref 26.8–34.3)
MCHC RBC AUTO-ENTMCNC: 33.7 G/DL (ref 31.4–37.4)
MCV RBC AUTO: 92 FL (ref 82–98)
MONOCYTES # BLD AUTO: 0.67 THOUSAND/ΜL (ref 0.17–1.22)
MONOCYTES NFR BLD AUTO: 9 % (ref 4–12)
NEUTROPHILS # BLD AUTO: 4.89 THOUSANDS/ΜL (ref 1.85–7.62)
NEUTS SEG NFR BLD AUTO: 68 % (ref 43–75)
NRBC BLD AUTO-RTO: 0 /100 WBCS
PLATELET # BLD AUTO: 173 THOUSANDS/UL (ref 149–390)
PMV BLD AUTO: 9.9 FL (ref 8.9–12.7)
POTASSIUM SERPL-SCNC: 4.4 MMOL/L (ref 3.5–5.3)
PROTHROMBIN TIME: 14.1 SECONDS (ref 11.6–14.5)
RBC # BLD AUTO: 2.25 MILLION/UL (ref 3.81–5.12)
SODIUM SERPL-SCNC: 134 MMOL/L (ref 136–145)
UNIT DISPENSE STATUS: NORMAL
UNIT PRODUCT CODE: NORMAL
UNIT PRODUCT VOLUME: 350 ML
UNIT RH: NORMAL
WBC # BLD AUTO: 7.16 THOUSAND/UL (ref 4.31–10.16)

## 2021-12-29 PROCEDURE — 80048 BASIC METABOLIC PNL TOTAL CA: CPT | Performed by: INTERNAL MEDICINE

## 2021-12-29 PROCEDURE — 99232 SBSQ HOSP IP/OBS MODERATE 35: CPT | Performed by: INTERNAL MEDICINE

## 2021-12-29 PROCEDURE — 85025 COMPLETE CBC W/AUTO DIFF WBC: CPT | Performed by: INTERNAL MEDICINE

## 2021-12-29 PROCEDURE — 85610 PROTHROMBIN TIME: CPT | Performed by: INTERNAL MEDICINE

## 2021-12-29 PROCEDURE — 82948 REAGENT STRIP/BLOOD GLUCOSE: CPT

## 2021-12-29 PROCEDURE — 99024 POSTOP FOLLOW-UP VISIT: CPT | Performed by: PODIATRIST

## 2021-12-29 PROCEDURE — 97530 THERAPEUTIC ACTIVITIES: CPT

## 2021-12-29 RX ADMIN — INSULIN LISPRO 1 UNITS: 100 INJECTION, SOLUTION INTRAVENOUS; SUBCUTANEOUS at 17:17

## 2021-12-29 RX ADMIN — HYDROMORPHONE HYDROCHLORIDE 1 MG: 1 INJECTION, SOLUTION INTRAMUSCULAR; INTRAVENOUS; SUBCUTANEOUS at 10:28

## 2021-12-29 RX ADMIN — NIFEDIPINE 30 MG: 30 TABLET, FILM COATED, EXTENDED RELEASE ORAL at 10:36

## 2021-12-29 RX ADMIN — INSULIN GLARGINE 20 UNITS: 100 INJECTION, SOLUTION SUBCUTANEOUS at 22:27

## 2021-12-29 RX ADMIN — GABAPENTIN 100 MG: 100 CAPSULE ORAL at 10:32

## 2021-12-29 RX ADMIN — INSULIN LISPRO 1 UNITS: 100 INJECTION, SOLUTION INTRAVENOUS; SUBCUTANEOUS at 12:48

## 2021-12-29 RX ADMIN — SEVELAMER HYDROCHLORIDE 1600 MG: 800 TABLET, FILM COATED PARENTERAL at 10:31

## 2021-12-29 RX ADMIN — OXYCODONE HYDROCHLORIDE AND ACETAMINOPHEN 2 TABLET: 5; 325 TABLET ORAL at 12:49

## 2021-12-29 RX ADMIN — DICLOFENAC SODIUM 2 G: 10 GEL TOPICAL at 22:19

## 2021-12-29 RX ADMIN — GABAPENTIN 100 MG: 100 CAPSULE ORAL at 17:15

## 2021-12-29 RX ADMIN — HEPARIN SODIUM 5000 UNITS: 5000 INJECTION INTRAVENOUS; SUBCUTANEOUS at 22:27

## 2021-12-29 RX ADMIN — GABAPENTIN 100 MG: 100 CAPSULE ORAL at 22:27

## 2021-12-29 RX ADMIN — HYDROMORPHONE HYDROCHLORIDE 1 MG: 1 INJECTION, SOLUTION INTRAMUSCULAR; INTRAVENOUS; SUBCUTANEOUS at 17:10

## 2021-12-29 RX ADMIN — NYSTATIN: 100000 POWDER TOPICAL at 17:16

## 2021-12-29 RX ADMIN — HEPARIN SODIUM 5000 UNITS: 5000 INJECTION INTRAVENOUS; SUBCUTANEOUS at 10:32

## 2021-12-29 RX ADMIN — INSULIN LISPRO 3 UNITS: 100 INJECTION, SOLUTION INTRAVENOUS; SUBCUTANEOUS at 12:47

## 2021-12-29 RX ADMIN — PANTOPRAZOLE SODIUM 40 MG: 40 TABLET, DELAYED RELEASE ORAL at 05:17

## 2021-12-29 RX ADMIN — DICLOFENAC SODIUM 2 G: 10 GEL TOPICAL at 12:51

## 2021-12-29 RX ADMIN — LEVOTHYROXINE SODIUM 50 MCG: 50 TABLET ORAL at 10:31

## 2021-12-29 RX ADMIN — SERTRALINE HYDROCHLORIDE 75 MG: 50 TABLET ORAL at 10:32

## 2021-12-29 RX ADMIN — CARVEDILOL 25 MG: 25 TABLET, FILM COATED ORAL at 10:32

## 2021-12-29 RX ADMIN — OXYCODONE HYDROCHLORIDE AND ACETAMINOPHEN 1 TABLET: 5; 325 TABLET ORAL at 06:49

## 2021-12-29 RX ADMIN — INSULIN LISPRO 3 UNITS: 100 INJECTION, SOLUTION INTRAVENOUS; SUBCUTANEOUS at 17:16

## 2021-12-29 RX ADMIN — WARFARIN SODIUM 9 MG: 7.5 TABLET ORAL at 17:13

## 2021-12-29 RX ADMIN — DICLOFENAC SODIUM 2 G: 10 GEL TOPICAL at 17:16

## 2021-12-29 RX ADMIN — TORSEMIDE 100 MG: 20 TABLET ORAL at 10:31

## 2021-12-29 RX ADMIN — SEVELAMER HYDROCHLORIDE 1600 MG: 800 TABLET, FILM COATED PARENTERAL at 12:52

## 2021-12-29 RX ADMIN — SEVELAMER HYDROCHLORIDE 1600 MG: 800 TABLET, FILM COATED PARENTERAL at 17:13

## 2021-12-29 RX ADMIN — Medication 3 MG: at 22:27

## 2021-12-29 RX ADMIN — CARVEDILOL 25 MG: 25 TABLET, FILM COATED ORAL at 17:15

## 2021-12-29 RX ADMIN — OXYCODONE HYDROCHLORIDE AND ACETAMINOPHEN 2 TABLET: 5; 325 TABLET ORAL at 22:26

## 2021-12-29 RX ADMIN — DICLOFENAC SODIUM 2 G: 10 GEL TOPICAL at 10:34

## 2021-12-29 RX ADMIN — ALPRAZOLAM 0.25 MG: 0.25 TABLET ORAL at 22:27

## 2021-12-29 NOTE — PROGRESS NOTES
[facility]  Progress Note Emir oSlano 1965, 49 y o  female MRN: [id number]  Unit/Bed#: [facility] 933-01 Encounter: [id number]  Primary Care Provider: Jose Alfredo Oquendo MD   Date and time admitted to hospital: 12/20/2021  3:07 PM    * Right foot ulceration with osteomyelitis  Assessment & Plan  Status post transmetatarsal amputation with Podiatry  Patient no monitor off antibiotics Infectious Disease input noted  Podiatry input noted patient is noncompliant with nonweightbearing status in this was discussed with the patient and strongly recommended nonweightbearing status for proper healing  Physical therapy    Essential hypertension  Assessment & Plan  Monitor blood pressures  Avoid hypotension    Insulin-dependent diabetes mellitus with neuropathy  Assessment & Plan  Lab Results   Component Value Date    HGBA1C 7 9 (H) 09/30/2021     Continue basal/prandial insulin w/ additional SSI coverage per Accu-Cheks  Carbohydrate restricted diet  Continue Gabapentin for neuropathy    Hyperlipidemia  Assessment & Plan  Continue Lipitor    Morbid obesity  Assessment & Plan  BMI of 43 41  Lifestyle/diet modifications    Anemia of chronic disease  Assessment & Plan  Monitor hemoglobin    ESRD on hemodialysis  Assessment & Plan  Routine hemodialysis per nephrology  Continue Renagel/Sensipar supplementation    History of venous thromboembolism  Assessment & Plan  Warfarin for anticoagulation  Goal INR 2-3  Monitor INR            VTE Pharmacologic Prophylaxis:   High Risk (Score >/= 5) - Pharmacological DVT Prophylaxis Ordered: heparin  Sequential Compression Devices Ordered  Patient Centered Rounds: I performed bedside rounds with nursing staff today  Discussions with Specialists or Other Care Team Provider:     Education and Discussions with Family / Patient: patient, updated significant other Surjit   Time Spent for Care: 30 minutes   More than 50% of total time spent on counseling and coordination of care as described above  Current Length of Stay: 9 day(s)  Current Patient Status: Inpatient   Certification Statement: The patient will continue to require additional inpatient hospital stay due to as outlined  Discharge Plan: Pending placement case management following    Code Status: Level 1 - Full Code    Subjective:     Comfortably in bed  Reports feeling okay  Encouraged incentive spirometry    Strongly recommended nonweightbearing status right foot    History chart labs medications reviewed    Objective:     Vitals:   Temp (24hrs), Av 3 °F (36 8 °C), Min:97 5 °F (36 4 °C), Max:99 4 °F (37 4 °C)    Temp:  [97 5 °F (36 4 °C)-99 4 °F (37 4 °C)] 97 5 °F (36 4 °C)  HR:  [65-84] 65  Resp:  [18] 18  BP: (100-160)/(45-70) 132/58  SpO2:  [90 %-97 %] 97 %  Body mass index is 43 41 kg/m²  Input and Output Summary (last 24 hours):      Intake/Output Summary (Last 24 hours) at 2021 1616  Last data filed at 2021 0859  Gross per 24 hour   Intake 690 ml   Output 0 ml   Net 690 ml       Physical Exam:   Physical Exam     Comfortably in bed  Obese  Short thick neck  Lungs diminished breath sounds bilaterally  Heart sounds distant S1-S2 noted  Abdomen soft nontender  Abdominal obesity noted  Awake obeys simple commands  Right foot bandage noted  No rash    Additional Data:     Labs:  Results from last 7 days   Lab Units 21  0721   WBC Thousand/uL 7 16   HEMOGLOBIN g/dL 7 0*   HEMATOCRIT % 20 8*   PLATELETS Thousands/uL 173   NEUTROS PCT % 68   LYMPHS PCT % 16   MONOS PCT % 9   EOS PCT % 5     Results from last 7 days   Lab Units 21  0721   SODIUM mmol/L 134*   POTASSIUM mmol/L 4 4   CHLORIDE mmol/L 100   CO2 mmol/L 29   BUN mg/dL 31*   CREATININE mg/dL 5 92*   ANION GAP mmol/L 5   CALCIUM mg/dL 8 9   GLUCOSE RANDOM mg/dL 73     Results from last 7 days   Lab Units 21  0721   INR  1 13     Results from last 7 days   Lab Units 21  1137 21  0829 21  2104 12/28/21  1708 12/28/21  1321 12/28/21  0855 12/27/21  2131 12/27/21  1614 12/27/21  1151 12/27/21  0738 12/26/21  2056 12/26/21  1602   POC GLUCOSE mg/dl 171* 98 284* 270* 155* 246* 146* 215* 214* 132 230* 96               Lines/Drains:  Invasive Devices  Report    Peripheral Intravenous Line            Peripheral IV Right;Ventral (anterior) Hand -- days          Line            Hemodialysis AV Fistula 02/20/18 Left Forearm 1408 days                      Imaging: Reviewed radiology reports from this admission including: MRI foot    Recent Cultures (last 7 days):         Last 24 Hours Medication List:   Current Facility-Administered Medications   Medication Dose Route Frequency Provider Last Rate    acetaminophen  650 mg Oral Q6H PRN Flor Mage, DPM      albuterol  2 puff Inhalation Q6H PRN Flor Mage, DPM      ALPRAZolam  0 25 mg Oral 4x Daily PRN Glenvil Mage, DPM      ammonium lactate   Topical BID PRN Glenvil Mage, DPM      calcium carbonate  500 mg Oral TID PRN Cielo Montiel MD      carvedilol  25 mg Oral BID With Meals Flor Mage, DPM      cinacalcet  30 mg Oral Daily Flor Mage, DPM      Diclofenac Sodium  2 g Topical 4x Daily Cielo Montiel MD      fentaNYL  25 mcg Intravenous Q5 Min PRN Carolyn Resendez CRNA      gabapentin  100 mg Oral TID Glenvil Mage, DPM      heparin (porcine)  5,000 Units Subcutaneous Q12H [facility], [facility]      hydrALAZINE  5 mg Intravenous Q6H PRN Flor Mage, DPM      HYDROmorphone  1 mg Intravenous Q3H PRN Cielo Montiel MD      insulin glargine  20 Units Subcutaneous HS Flor Mage, DPM      insulin lispro  1-5 Units Subcutaneous TID [facility] Glenvil Mage, DPM      insulin lispro  3 Units Subcutaneous TID With Meals Glenvil Mage, DPM      levothyroxine  50 mcg Oral Daily Flor Mage, DPM      lidocaine  1 patch Topical Daily Cielo Montiel MD      melatonin  3 mg Oral HS Glenvil Mage, DPM      metoclopramide  10 mg Intravenous Q6H PRN Nita BONILLA LILLIANA Beckwith      NIFEdipine  30 mg Oral Daily [facility] [place]      nystatin   Topical BID Dalphine Princess, DPM      ondansetron  4 mg Intravenous Once PRN Marleonardo Milligan, CRNA      oxyCODONE-acetaminophen  1 tablet Oral Q6H PRN Dallawsonne Princess, DPM      oxyCODONE-acetaminophen  2 tablet Oral Q6H PRN Kaitlyn Hernandez MD      pantoprazole  40 mg Oral Early Morning Dania Sánchez, WILTONM      polyethylene glycol  17 g Oral Daily PRN Dalphine Princess, DPM      senna  2 tablet Oral HS PRN Dalphine Princess, DPM      sertraline  75 mg Oral Daily Rebecca, [place]      sevelamer  1,600 mg Oral TID With Meals Dania Sánchez, WILTON      torsemide  100 mg Oral Daily [facility] Hospital[facility] Sánchez, DPM      warfarin  9 mg Oral Once per day on Mon Tue Wed Thu Fri Sat Kaitlyn Hernandez MD          Today, Patient Was Seen By: Ramses Hobbs MD    **Please Note: This note may have been constructed using a voice recognition system  **

## 2021-12-29 NOTE — PROGRESS NOTES
Progress Note - Infectious Disease   Duane Horton 49 y o  female MRN: [id number]  Unit/Bed#: [facility] 933-01 Encounter: [id number]      Impression/Recommendations:  1   Right foot cellulitis   Due to #2   Blood cultures negative   Clinically stable without sepsis   Improved status post >7 days IV antibiotics  Rec:  · Discontinue antibiotics as below  · Follow temperatures and WBC closely  · Supportive care as per the primary service     2   Chronic right foot DM ulcer with 1st MT/toe osteomyelitis   In setting of #3   PTB by Podiatry  Ade Walsh, MRI show bone infection   Wound culture with MRSA  Patient is status post TMA with a surgical cure  NO residual cellulitis on postop dressing change  Rec:  · Discontinue antibiotics to complete treatment course  · Postoperative care per Podiatry  · Reinforced with patient need for strict NWB as recommended by Podiatry     3   Recent 1st MTPJ septic arthritis   2021  Status post washout   Fluid aspirate with MRSA, OR cultures with Enterococcus, lactobacillus, Clostridium   Status post 6 weeks IV antibiotics through    Now complicated by above      4   Poorly controlled DM with DPN    Recent A1c 2021 7 9   Risk factor for above      5   ESRD on HD  Lorie Ray left AVF      The patient is stable from an ID standpoint  We will sign off for now  Please call with new questions      Antibiotics:  Vancomycin #8  POD #3    Subjective:  Patient seen on AM rounds  Denies fevers, chills, sweats, nausea, vomiting, or diarrhea  24 Hour Events:  No documented fevers, chills, sweats, nausea, vomiting, or diarrhea      Objective:  Vitals:  Temp:  [97 6 °F (36 4 °C)-99 4 °F (37 4 °C)] 97 6 °F (36 4 °C)  HR:  [70-92] 70  Resp:  [18] 18  BP: (100-160)/(45-73) 129/62  SpO2:  [90 %-95 %] 95 %  Temp (24hrs), Av 3 °F (36 8 °C), Min:97 6 °F (36 4 °C), Max:99 4 °F (37 4 °C)  Current: Temperature: 97 6 °F (36 4 °C)    Physical Exam:   General:  No acute distress  Psychiatric:  Awake and alert  Pulmonary:  Normal respiratory excursion without accessory muscle use  Abdomen:  Soft, nontender  Extremities:  Wound photo reviewed:  Well-coapted incision intact with sutures, no redness or purulence  Skin:  No rashes    Lab Results:  I have personally reviewed pertinent labs  Results from last 7 days   Lab Units 12/29/21  0721 12/28/21  0931 12/27/21  0650   POTASSIUM mmol/L 4 4 4 7 4 5   CHLORIDE mmol/L 100 101 103   CO2 mmol/L 29 23 25   BUN mg/dL 31* 48* 36*   CREATININE mg/dL 5 92* 8 17* 6 11*   EGFR ml/min/1 73sq m 7 5 7   CALCIUM mg/dL 8 9 8 8 8 2*     Results from last 7 days   Lab Units 12/29/21  0721 12/28/21  0931 12/27/21  0650   WBC Thousand/uL 7 16 9 02 9 42   HEMOGLOBIN g/dL 7 0* 6 4* 7 0*   PLATELETS Thousands/uL 173 210 250           Imaging Studies:   I have personally reviewed pertinent imaging study reports and images in PACS  EKG, Pathology, and Other Studies:   I have personally reviewed pertinent reports

## 2021-12-29 NOTE — PROGRESS NOTES
Vancomycin IV Pharmacy-to-Dose Consultation    Marcelino Khan is a 49 y o  female who is currently receiving Vancomycin IV with management by the Pharmacy Consult service  Assessment/Plan:  The patient was reviewed  Patient is ESRD on HD Tuesday, Thursday, Saturday with no signs or symptoms of nephrotoxicity and/or infusion reactions were documented in the chart  Based on todays assessment, continue current vancomycin (day # 9) dosing of 750 mg IV after dialysis on Tuesday, Thursday Saturday, with a plan for pre-HD random to be drawn at 0600 on 12/30  We will continue to follow the patients culture results and clinical progress daily      Cindy Hines, Pharmacist

## 2021-12-29 NOTE — PLAN OF CARE
Problem: PHYSICAL THERAPY ADULT  Goal: Performs mobility at highest level of function for planned discharge setting  See evaluation for individualized goals  Description: Treatment/Interventions: ADL retraining,Functional transfer training,LE strengthening/ROM,Patient/family training,Equipment eval/education,Bed mobility,Gait training,Spoke to nursing,Spoke to case management,OT  Equipment Recommended: [facility]       See flowsheet documentation for full assessment, interventions and recommendations  Outcome: Progressing  Note: Prognosis: Fair  Problem List: Decreased strength,Decreased endurance,Impaired balance,Decreased mobility,Decreased skin integrity,Pain,Orthopedic restrictions,Decreased safety awareness,Obesity  Assessment: Pt seen for PT treatment session this date  Therapy session focused on bed mobility, functional transfers, gait training and education regarding WBS and DME in order to improve overall mobility and independence  Pt requires assist of 1 for all mobility performed this date  Pt not compliant with WBS, per podiatry strict NWB on RLE  Per pt report, she has been ambulating in room- not abiding by WBS  Extended time required to educate pt on WBS and importance of maintaining it to aide in healing process; pt not receptive stating "I only put a little pressure on my foot"  Pt requesting to trial crutches instead of RW; pt educated on benefits of RW compared to crutches as they are more stable and easier to coordinate however pt still wanting to trial crutches  Crutches trialed however pt unsafe and unable to perform hop to gait pattern and maintain NWB on RLE  Pt instead performed stand pivot transfered from EOB using RW to bedside chair with mod Ax1  Pt declining further mobility 2* frustration regarding medical status  Pt making progress toward goals  Pt was left sitting upright in bedside chair with chair alarm on at the end of PT session with all needs in reach   Pt would benefit from continued PT services while in hospital to address remaining limitations  PT to continue to follow pt and recommends post-acute rehab services after d/c  The patient's AM-PAC Basic Mobility Inpatient Short Form Raw Score is 15, Standardized Score is 36 97  A standardized score less than 42 9 suggests the patient may benefit from discharge to post-acute rehabilitation services  Please also refer to the recommendation of the Physical Therapist for safe discharge planning  Barriers to Discharge: Inaccessible home environment (pt has 15 BARBARA )        PT Discharge Recommendation: Post acute rehabilitation services          See flowsheet documentation for full assessment

## 2021-12-29 NOTE — ASSESSMENT & PLAN NOTE
Status post transmetatarsal amputation with Podiatry  Patient no monitor off antibiotics Infectious Disease input noted  Podiatry input noted patient is noncompliant with nonweightbearing status in this was discussed with the patient and strongly recommended nonweightbearing status for proper healing  Physical therapy

## 2021-12-29 NOTE — PROGRESS NOTES
Was contacted by nursing that patient has been stepping on right TMA foot to go to bathroom despite being fully aware and understanding of strict non weight bearing status  Patient was seen at bedside and dressings were removed to inspect surgical site  All sutures were found to be intact with no dehiscence of surgical site  Mild edema noted, likely due to weight bearing on foot  Fresh dressings were applied  A surgical shoe was ordered for added protection, however patient is to remain strictly non weight bearing to the right foot  Patient once again expresses full understanding that stepping on the foot at all puts her at risk of losing her leg          R TMA site

## 2021-12-29 NOTE — PHYSICAL THERAPY NOTE
PHYSICAL THERAPY NOTE          Patient Name: Castro Oquendo  QSURD'E Date: 12/29/2021 12/29/21 1211   PT Last Visit   PT Visit Date 12/29/21   Note Type   Note Type Treatment   Pain Assessment   Pain Assessment Tool 0-10   Pain Score 8   Pain Location/Orientation Orientation: Right;Location: Foot; Location: Back   Patient's Stated Pain Goal No pain   Hospital Pain Intervention(s) Ambulation/increased activity; Emotional support;Repositioned   Restrictions/Precautions   Weight Bearing Precautions Per Order Yes   RLE Weight Bearing Per Order NWB  (strict NWB on RLE per podiatry s/p TMA )   Other Precautions Chair Alarm; Bed Alarm; Fall Risk;Pain;WBS   General   Chart Reviewed Yes   Response to Previous Treatment Patient with no complaints from previous session  Family/Caregiver Present No   Cognition   Arousal/Participation Alert; Responsive   Attention Within functional limits   Orientation Level Oriented to person;Oriented to place;Oriented to time   Memory Decreased recall of precautions   Following Commands Follows one step commands without difficulty   Comments Pt cooperative to participate in therapy sesison  Non- compliant to WBS despite extensive education  Tearful at times regarding medical status  Emotional support required  Bed Mobility   Supine to Sit 5  Supervision   Additional items Assist x 1; Increased time required;Verbal cues;HOB elevated; Bedrails   Sit to Supine Unable to assess   Additional Comments Pt supine in bed upon arrival  Pt sitting upright in bedside chair with chair alarm on and all needs within reach  Transfers   Sit to Stand 3  Moderate assistance   Additional items Assist x 1; Increased time required;Verbal cues  (using crutches, transitioned to RW )   Stand to Sit 4  Minimal assistance   Additional items Assist x 1; Increased time required;Verbal cues   Stand pivot 3  Moderate assistance  (using RW ) Additional items Assist x 1; Increased time required;Verbal cues   Additional Comments initially requesting to trial crutches transitioned to RW; unable to perform hop to gait pattern performed SPT from EOB to bedside chair    Ambulation/Elevation   Gait pattern Not appropriate  (unable to perform hop to gait pattern )   Balance   Static Sitting Fair +   Dynamic Sitting Fair   Static Standing Poor +   Dynamic Standing Poor   Activity Tolerance   Activity Tolerance Patient limited by fatigue;Patient limited by pain   Medical Staff Made Aware restorative, podiatry, nursing    Nurse Made Aware RN cleared pt to participate in therapy session   (updated post therapy session )   Exercises   Balance training  sitting EOB at close S level ~15 min performing static/ dynamic tasks  Assessment   Prognosis Fair   Problem List Decreased strength;Decreased endurance; Impaired balance;Decreased mobility; Decreased skin integrity;Pain;Orthopedic restrictions;Decreased safety awareness; Obesity   Assessment Pt seen for PT treatment session this date  Therapy session focused on bed mobility, functional transfers, gait training and education regarding WBS and DME in order to improve overall mobility and independence  Pt requires assist of 1 for all mobility performed this date  Pt not compliant with WBS, per podiatry strict NWB on RLE  Per pt report, she has been ambulating in room- not abiding by WBS  Extended time required to educate pt on WBS and importance of maintaining it to aide in healing process; pt not receptive stating "I only put a little pressure on my foot"  Pt requesting to trial crutches instead of RW; pt educated on benefits of RW compared to crutches as they are more stable and easier to coordinate however pt still wanting to trial crutches  Crutches trialed however pt unsafe and unable to perform hop to gait pattern and maintain NWB on RLE   Pt instead performed stand pivot transfered from EOB using RW to bedside chair with mod Ax1  Pt declining further mobility 2* frustration regarding medical status  Pt making progress toward goals  Pt was left sitting upright in bedside chair with chair alarm on at the end of PT session with all needs in reach  Pt would benefit from continued PT services while in hospital to address remaining limitations  PT to continue to follow pt and recommends post-acute rehab services after d/c  The patient's AM-PAC Basic Mobility Inpatient Short Form Raw Score is 15, Standardized Score is 36 97  A standardized score less than 42 9 suggests the patient may benefit from discharge to post-acute rehabilitation services  Please also refer to the recommendation of the Physical Therapist for safe discharge planning  Barriers to Discharge Inaccessible home environment  (pt has 15 BARBARA )   Goals   Patient Goals to go home    STG Expiration Date 01/10/21   PT Treatment Day 1   Plan   Treatment/Interventions ADL retraining;Functional transfer training;LE strengthening/ROM; Endurance training;Patient/family training;Equipment eval/education; Bed mobility;Spoke to nursing;Spoke to case management;Spoke to advanced practitioner   Progress Progressing toward goals   PT Frequency 3-5x/wk   Recommendation   PT Discharge Recommendation Post acute rehabilitation services   Equipment Recommended Walker; Wheelchair   AM-PAC Basic Mobility Inpatient   Turning in Bed Without Bedrails 3   Lying on Back to Sitting on Edge of Flat Bed 3   Moving Bed to Chair 3   Standing Up From Chair 3   Walk in Room 2   Climb 3-5 Stairs 1   Basic Mobility Inpatient Raw Score 15   Basic Mobility Standardized Score 36 97   Highest Level Of Mobility   JH-HLM Goal 4: Move to chair/commode   JH-HLM Highest Level of Mobility 6: Walk 10 steps or more   JH-HLM Goal Achieved Yes   Education   Education Provided Mobility training;Assistive device  (+ WBS )   Patient Reinforcement needed   End of Consult   Patient Position at End of Consult Bedside chair;Bed/Chair alarm activated; All needs within reach     Dain Goodson, PT, DPT

## 2021-12-30 ENCOUNTER — APPOINTMENT (INPATIENT)
Dept: DIALYSIS | Facility: HOSPITAL | Age: 56
DRG: 617 | End: 2021-12-30
Payer: MEDICARE

## 2021-12-30 LAB
GLUCOSE SERPL-MCNC: 100 MG/DL (ref 65–140)
GLUCOSE SERPL-MCNC: 193 MG/DL (ref 65–140)
GLUCOSE SERPL-MCNC: 209 MG/DL (ref 65–140)
HCT VFR BLD AUTO: 21 % (ref 34.8–46.1)
HGB BLD-MCNC: 7 G/DL (ref 11.5–15.4)
INR PPP: 1.22 (ref 0.84–1.19)
PROTHROMBIN TIME: 14.9 SECONDS (ref 11.6–14.5)

## 2021-12-30 PROCEDURE — 99232 SBSQ HOSP IP/OBS MODERATE 35: CPT | Performed by: INTERNAL MEDICINE

## 2021-12-30 PROCEDURE — 85610 PROTHROMBIN TIME: CPT | Performed by: INTERNAL MEDICINE

## 2021-12-30 PROCEDURE — 85014 HEMATOCRIT: CPT | Performed by: INTERNAL MEDICINE

## 2021-12-30 PROCEDURE — 90935 HEMODIALYSIS ONE EVALUATION: CPT | Performed by: INTERNAL MEDICINE

## 2021-12-30 PROCEDURE — 82948 REAGENT STRIP/BLOOD GLUCOSE: CPT

## 2021-12-30 PROCEDURE — 85018 HEMOGLOBIN: CPT | Performed by: INTERNAL MEDICINE

## 2021-12-30 RX ADMIN — NYSTATIN: 100000 POWDER TOPICAL at 08:12

## 2021-12-30 RX ADMIN — ALPRAZOLAM 0.25 MG: 0.25 TABLET ORAL at 21:59

## 2021-12-30 RX ADMIN — HEPARIN SODIUM 5000 UNITS: 5000 INJECTION INTRAVENOUS; SUBCUTANEOUS at 21:59

## 2021-12-30 RX ADMIN — INSULIN GLARGINE 20 UNITS: 100 INJECTION, SOLUTION SUBCUTANEOUS at 21:59

## 2021-12-30 RX ADMIN — INSULIN LISPRO 3 UNITS: 100 INJECTION, SOLUTION INTRAVENOUS; SUBCUTANEOUS at 08:11

## 2021-12-30 RX ADMIN — GABAPENTIN 100 MG: 100 CAPSULE ORAL at 07:17

## 2021-12-30 RX ADMIN — Medication 3 MG: at 21:59

## 2021-12-30 RX ADMIN — WARFARIN SODIUM 9 MG: 7.5 TABLET ORAL at 18:11

## 2021-12-30 RX ADMIN — LEVOTHYROXINE SODIUM 50 MCG: 50 TABLET ORAL at 07:17

## 2021-12-30 RX ADMIN — CINACALCET 30 MG: 30 TABLET, FILM COATED ORAL at 18:04

## 2021-12-30 RX ADMIN — CARVEDILOL 25 MG: 25 TABLET, FILM COATED ORAL at 18:02

## 2021-12-30 RX ADMIN — INSULIN LISPRO 3 UNITS: 100 INJECTION, SOLUTION INTRAVENOUS; SUBCUTANEOUS at 12:31

## 2021-12-30 RX ADMIN — DICLOFENAC SODIUM 2 G: 10 GEL TOPICAL at 22:00

## 2021-12-30 RX ADMIN — OXYCODONE HYDROCHLORIDE AND ACETAMINOPHEN 2 TABLET: 5; 325 TABLET ORAL at 18:02

## 2021-12-30 RX ADMIN — GABAPENTIN 100 MG: 100 CAPSULE ORAL at 21:59

## 2021-12-30 RX ADMIN — INSULIN LISPRO 1 UNITS: 100 INJECTION, SOLUTION INTRAVENOUS; SUBCUTANEOUS at 08:11

## 2021-12-30 RX ADMIN — ALPRAZOLAM 0.25 MG: 0.25 TABLET ORAL at 07:16

## 2021-12-30 RX ADMIN — HYDROMORPHONE HYDROCHLORIDE 1 MG: 1 INJECTION, SOLUTION INTRAMUSCULAR; INTRAVENOUS; SUBCUTANEOUS at 09:04

## 2021-12-30 RX ADMIN — SEVELAMER HYDROCHLORIDE 1600 MG: 800 TABLET, FILM COATED PARENTERAL at 18:02

## 2021-12-30 RX ADMIN — SERTRALINE HYDROCHLORIDE 75 MG: 50 TABLET ORAL at 07:17

## 2021-12-30 RX ADMIN — INSULIN LISPRO 3 UNITS: 100 INJECTION, SOLUTION INTRAVENOUS; SUBCUTANEOUS at 18:04

## 2021-12-30 RX ADMIN — SEVELAMER HYDROCHLORIDE 1600 MG: 800 TABLET, FILM COATED PARENTERAL at 12:29

## 2021-12-30 RX ADMIN — TORSEMIDE 100 MG: 20 TABLET ORAL at 12:29

## 2021-12-30 RX ADMIN — NYSTATIN: 100000 POWDER TOPICAL at 18:05

## 2021-12-30 RX ADMIN — GABAPENTIN 100 MG: 100 CAPSULE ORAL at 18:02

## 2021-12-30 RX ADMIN — INSULIN LISPRO 1 UNITS: 100 INJECTION, SOLUTION INTRAVENOUS; SUBCUTANEOUS at 18:03

## 2021-12-30 RX ADMIN — CALCIUM CARBONATE (ANTACID) CHEW TAB 500 MG 500 MG: 500 CHEW TAB at 23:43

## 2021-12-30 RX ADMIN — PANTOPRAZOLE SODIUM 40 MG: 40 TABLET, DELAYED RELEASE ORAL at 05:50

## 2021-12-30 RX ADMIN — NIFEDIPINE 30 MG: 30 TABLET, FILM COATED, EXTENDED RELEASE ORAL at 18:10

## 2021-12-30 RX ADMIN — DICLOFENAC SODIUM 2 G: 10 GEL TOPICAL at 08:13

## 2021-12-30 RX ADMIN — OXYCODONE HYDROCHLORIDE AND ACETAMINOPHEN 2 TABLET: 5; 325 TABLET ORAL at 07:16

## 2021-12-30 RX ADMIN — DICLOFENAC SODIUM 2 G: 10 GEL TOPICAL at 18:05

## 2021-12-30 NOTE — PLAN OF CARE
Post-Dialysis RN Treatment Note    Blood Pressure:  Pre 126/18mm/Hg  Post 121/67 mmHg   EDW  121 kg    Weight:  Pre deferred kg   Post 124 kg   Mode of weight measurement: Other came in a w/c ,cannot stand for standing weight  Post weight in a chair with an scale   Volume Removed 3000 ml    Treatment duration 180 minutes    NS given no   Treatment shortened?  No   Medications given during Rx Dilaudid 1 mg   Estimated Kt/V  N/a, no pre weight   Access type: AV fistula   Access Issues: No    Report called to primary nurse   Yes Ronal Obrien RN  Plan for 3 hrs today with an UF of 3 kg  Problem: METABOLIC, FLUID AND ELECTROLYTES - ADULT  Goal: Electrolytes maintained within normal limits  Description: INTERVENTIONS:  - Monitor labs and assess patient for signs and symptoms of electrolyte imbalances  - Administer electrolyte replacement as ordered  - Monitor response to electrolyte replacements, including repeat lab results as appropriate  - Instruct patient on fluid and nutrition as appropriate  Outcome: Progressing  Goal: Fluid balance maintained  Description: INTERVENTIONS:  - Monitor labs   - Monitor I/O and WT  - Instruct patient on fluid and nutrition as appropriate  - Assess for signs & symptoms of volume excess or deficit  Outcome: Progressing

## 2021-12-30 NOTE — PROCEDURES
NEPHROLOGY DIALYSIS PROCEDURE NOTE      Patient seen and examined on Hemodialysis, tolerating procedure well  All documentation, labs, medications were reviewed by myself, and the treatment plan was reviewed with nurse and patient  Seen on Dialysis at : 8:36 AM  Dialysis Access: Left Arm AV Fistula  Vitals:  119/80, temperature 98°, pulse 67 beats per minute respiratory rate 18  Dialysis time: 3 hours  Dialyzer: F180  Sodium bath: 138  Potassium bath: 3 K+  Bicarbonate bath: 35  Calcium bath: 2 5  Ultrafiltration: 2 5-3 L  Blood flow rate: 400 cc/min  Dialysis flow rate: 1 5 X Qb  Dialysis temperature: 35C  Medications given on HD:  None, will draw hemoglobin and hematocrit    Assessment/Plan:    61-year-old female with a history of end-stage renal disease on hemodialysis, hypertension, anemia of chronic kidney disease who presents for right foot ulcer which was found to be osteomyelitis   Nephrology consult for ESRD management      ASSESSMENT and PLAN:     End-stage kidney disease  --incenter hemodialysis Tuesday Thursday Saturday at [address]  --access:  Left arm AV fistula  --given the holiday schedule, plan for next dialysis on Sunday  --stable from a renal standpoint for discharge to rehab     Anemia of chronic kidney disease  --transfuse for hemoglobin less than 7  --hemoglobin stable below goal  --not on MITCHELL due to history of pulmonary embolism  --check a repeat hemoglobin and hematocrit today, if less than 7 can transfuse 1 unit can be done on the floors if needed     Hypertension  --carvedilol 25 mg twice a day, nifedipine 30 mg daily, restarted back on torsemide 100 mg daily  --dry weight 121 1 kg, may need to adjust post surgery     Mineral bone disorder-chronic kidney disease  --continue sevelamer 1600 mg t i d   With meals, Sensipar 30 mg daily  --renal diet     Right foot osteomyelitis  --status post right amputation the transmetatarsal  --completed antibiotic course  --no white count and afebrile    Review of Systems: The entire 12 point ROS has been reviewed      Physical Exam:    General:  Awake, no acute distress  Skin:  Dry and pale  CVS:  S1-S2 appreciated regular rate rhythm  Lungs:  Clear to auscultation  Abdomen:  Nontender nondistended  Access:  Left arm AV fistula with an audible bruit and palpable thrill  Extremities:  No significant edema  Neuro:  No asterixis          Current Facility-Administered Medications:     acetaminophen (TYLENOL) tablet 650 mg, 650 mg, Oral, Q6H PRN, Ashok Patel, DPM, 650 mg at 12/28/21 1721    albuterol (PROVENTIL HFA,VENTOLIN HFA) inhaler 2 puff, 2 puff, Inhalation, Q6H PRN, Ashok Patel, DPJULITA    ALPRAZolam Evetta E[place]) tablet 0 25 mg, 0 25 mg, Oral, 4x Daily PRN, Ashok Patel DPM, 0 25 mg at 12/30/21 0716    ammonium lactate (LAC-HYDRIN) 12 % cream, , Topical, BID PRN, Ashok Patel DPM, Given at 12/21/21 2207    calcium carbonate (TUMS) chewable tablet 500 mg, 500 mg, Oral, TID PRN, Nita Cha MD, 500 mg at 12/27/21 1743    carvedilol (COREG) tablet 25 mg, 25 mg, Oral, BID With Meals, Ashok Patel DPM, 25 mg at 12/29/21 1715    cinacalcet (SENSIPAR) tablet 30 mg, 30 mg, Oral, Daily, WILTON KeenM, 30 mg at 12/27/21 1145    Diclofenac Sodium (VOLTAREN) 1 % topical gel 2 g, 2 g, Topical, 4x Daily, Nita Cha MD, 2 g at 12/30/21 0813    fentaNYL (SUBLIMAZE) injection 25 mcg, 25 mcg, Intravenous, Q5 Min PRN, Cassia Norman CRNA    gabapentin (NEURONTIN) capsule 100 mg, 100 mg, Oral, TID, Ashok Patel DPM, 100 mg at 12/30/21 2759    heparin (porcine) subcutaneous injection 5,000 Units, 5,000 Units, Subcutaneous, Q12H [address], Ashok Patel DPM, 5,000 Units at 12/29/21 2227    hydrALAZINE (APRESOLINE) injection 5 mg, 5 mg, Intravenous, Q6H PRN, Ashok Patel DPM, 5 mg at 12/21/21 0047    HYDROmorphone (DILAUDID) injection 1 mg, 1 mg, Intravenous, Q3H PRN, Nita Cha MD, 1 mg at 12/29/21 1710    insulin glargine (LANTUS) subcutaneous injection 20 Units 0 2 mL, 20 Units, Subcutaneous, HS, [place] Mage, DPM, 20 Units at 12/29/21 2227    insulin lispro (HumaLOG) 100 units/mL subcutaneous injection 1-5 Units, 1-5 Units, Subcutaneous, TID AC, 1 Units at 12/30/21 0811 **AND** Fingerstick Glucose (POCT), , , TID AC, [place] Mage, DPM    insulin lispro (HumaLOG) 100 units/mL subcutaneous injection 3 Units, 3 Units, Subcutaneous, TID With Meals, [place] Mage, DPM, 3 Units at 12/30/21 4688    levothyroxine tablet 50 mcg, 50 mcg, Oral, Daily, Flor Mage, DPM, 50 mcg at 12/30/21 0717    lidocaine (LIDODERM) 5 % patch 1 patch, 1 patch, Topical, Daily, Cielo Montiel MD, 1 patch at 12/27/21 1436    melatonin tablet 3 mg, 3 mg, Oral, HS, Flor Mage, DPM, 3 mg at 12/29/21 2227    metoclopramide (REGLAN) injection 10 mg, 10 mg, Intravenous, Q6H PRN, Melita Beckwith PA-C    NIFEdipine (PROCARDIA XL) 24 hr tablet 30 mg, 30 mg, Oral, Daily, Flor Mage, DPM, 30 mg at 12/29/21 1036    nystatin (MYCOSTATIN) powder, , Topical, BID, [place] Mage, DPM, Given at 12/30/21 8258    ondansetron [facility]) injection 4 mg, 4 mg, Intravenous, Once PRN, Carolyn Resendez CRNA    oxyCODONE-acetaminophen (PERCOCET) 5-325 mg per tablet 1 tablet, 1 tablet, Oral, Q6H PRN, [place] Mage, DPM, 1 tablet at 12/29/21 0649    oxyCODONE-acetaminophen (PERCOCET) 5-325 mg per tablet 2 tablet, 2 tablet, Oral, Q6H PRN, Cielo Montiel MD, 2 tablet at 12/30/21 0716    pantoprazole (PROTONIX) EC tablet 40 mg, 40 mg, Oral, Early Morning, Flor Mage, DPM, 40 mg at 12/30/21 0550    polyethylene glycol (MIRALAX) packet 17 g, 17 g, Oral, Daily PRN, [place] Mage, DPM    senna (SENOKOT) tablet 17 2 mg, 2 tablet, Oral, HS PRN, [place] Mage, DPM    sertraline (ZOLOFT) tablet 75 mg, 75 mg, Oral, Daily, Flor Mage, DPM, 75 mg at 12/30/21 2596    sevelamer (RENAGEL) tablet 1,600 mg, 1,600 mg, Oral, TID With Meals, Flor Mage, DPM, 1,600 mg at 12/29/21 1713    torsemide ([facility]) tablet 100 mg, 100 mg, Oral, Daily, Olimporonen Alegria DPM, 100 mg at 12/29/21 1031    warfarin (COUMADIN) tablet 9 mg, 9 mg, Oral, Once per day on Mon Tue Wed Thu Fri SatCielo MD, 9 mg at 12/29/21 3922

## 2021-12-30 NOTE — ASSESSMENT & PLAN NOTE
Status post transmetatarsal amputation with Podiatry  Patient no monitor off antibiotics Infectious Disease input noted  Podiatry input noted patient is noncompliant with nonweightbearing status in this was discussed with the patient and strongly recommended nonweightbearing status for proper healing  Physical therapy  -pending placement

## 2021-12-30 NOTE — PROGRESS NOTES
[address] [facility]  Progress Note Hubert Milligan 1965, 49 y o  female MRN: [id number]  Unit/Bed#: [facility] 933-01 Encounter: [id number]  Primary Care Provider: Amanda Hoskins MD   Date and time admitted to hospital: 12/20/2021  3:07 PM    Essential hypertension  Assessment & Plan  Monitor blood pressures  Avoid hypotension    Insulin-dependent diabetes mellitus with neuropathy  Assessment & Plan  Lab Results   Component Value Date    HGBA1C 7 9 (H) 09/30/2021     Continue basal/prandial insulin w/ additional SSI coverage per Accu-Cheks  Carbohydrate restricted diet  Continue Gabapentin for neuropathy    Hyperlipidemia  Assessment & Plan  Continue Lipitor    Morbid obesity  Assessment & Plan  BMI of 43 41  Lifestyle/diet modifications    Anemia of chronic disease  Assessment & Plan  Monitor hemoglobin    ESRD on hemodialysis  Assessment & Plan  Routine hemodialysis per nephrology  Continue Renagel/Sensipar supplementation    History of venous thromboembolism  Assessment & Plan  Warfarin for anticoagulation  Goal INR 2-3, currently 1 22, will continue 9 mg for now and adjust if needed  Monitor INR    * Right foot ulceration with osteomyelitis  Assessment & Plan  Status post transmetatarsal amputation with Podiatry  Patient no monitor off antibiotics Infectious Disease input noted  Podiatry input noted patient is noncompliant with nonweightbearing status in this was discussed with the patient and strongly recommended nonweightbearing status for proper healing  Physical therapy  -pending placement          VTE Pharmacologic Prophylaxis:   High Risk (Score >/= 5) - Pharmacological DVT Prophylaxis Ordered: warfarin (Coumadin)  Sequential Compression Devices Ordered  Patient Centered Rounds: I performed bedside rounds with nursing staff today    Discussions with Specialists or Other Care Team Provider: n/a    Education and Discussions with Family / Patient: Updated  (significant other) at bedside  Time Spent for Care: 30 minutes  More than 50% of total time spent on counseling and coordination of care as described above  Current Length of Stay: 10 day(s)  Current Patient Status: Inpatient   Certification Statement: The patient will continue to require additional inpatient hospital stay due to Pending placement to rehab  Discharge Plan: Anticipate discharge in 24-48 hrs to rehab facility  Code Status: Level 1 - Full Code    Subjective:   Patient denies any acute complaints today, apart from mild fatigue after her dialysis treatment today    Objective:     Vitals:   Temp (24hrs), Av 8 °F (36 6 °C), Min:97 4 °F (36 3 °C), Max:98 °F (36 7 °C)    Temp:  [97 4 °F (36 3 °C)-98 °F (36 7 °C)] 97 4 °F (36 3 °C)  HR:  [52-72] 72  Resp:  [18] 18  BP: (108-187)/() 108/81  Body mass index is 43 41 kg/m²  Input and Output Summary (last 24 hours): Intake/Output Summary (Last 24 hours) at 2021 1603  Last data filed at 2021 1120  Gross per 24 hour   Intake 620 ml   Output 3501 ml   Net -2881 ml       Physical Exam:   Physical Exam  Vitals and nursing note reviewed  Constitutional:       General: She is not in acute distress  Appearance: She is well-developed  She is not ill-appearing, toxic-appearing or diaphoretic  HENT:      Head: Normocephalic and atraumatic  Eyes:      General: No scleral icterus  Conjunctiva/sclera: Conjunctivae normal    Cardiovascular:      Rate and Rhythm: Normal rate and regular rhythm  Heart sounds: No murmur heard  No friction rub  No gallop  Pulmonary:      Effort: Pulmonary effort is normal  No respiratory distress  Breath sounds: Normal breath sounds  No stridor  No wheezing, rhonchi or rales  Chest:      Chest wall: No tenderness  Abdominal:      General: There is no distension  Palpations: Abdomen is soft  There is no mass  Tenderness: There is no abdominal tenderness   There is no guarding or rebound  Hernia: No hernia is present  Musculoskeletal:         General: No swelling, tenderness, deformity or signs of injury  Cervical back: Neck supple  Right lower leg: No edema  Left lower leg: No edema  Comments: Left arm AV fistula  Foot wrapped clean dry intact   Skin:     General: Skin is warm and dry  Coloration: Skin is not jaundiced or pale  Findings: No bruising, erythema, lesion or rash  Neurological:      Mental Status: She is alert and oriented to person, place, and time  Additional Data:     Labs:  Results from last 7 days   Lab Units 12/30/21  0843 12/29/21  0721 12/29/21  0721   WBC Thousand/uL  --   --  7 16   HEMOGLOBIN g/dL 7 0*   < > 7 0*   HEMATOCRIT % 21 0*   < > 20 8*   PLATELETS Thousands/uL  --   --  173   NEUTROS PCT %  --   --  68   LYMPHS PCT %  --   --  16   MONOS PCT %  --   --  9   EOS PCT %  --   --  5    < > = values in this interval not displayed  Results from last 7 days   Lab Units 12/29/21  0721   SODIUM mmol/L 134*   POTASSIUM mmol/L 4 4   CHLORIDE mmol/L 100   CO2 mmol/L 29   BUN mg/dL 31*   CREATININE mg/dL 5 92*   ANION GAP mmol/L 5   CALCIUM mg/dL 8 9   GLUCOSE RANDOM mg/dL 73     Results from last 7 days   Lab Units 12/30/21  0811   INR  1 22*     Results from last 7 days   Lab Units 12/30/21  1235 12/30/21  0744 12/29/21  2144 12/29/21  1701 12/29/21  1137 12/29/21  0829 12/28/21  2104 12/28/21  1708 12/28/21  1321 12/28/21  0855 12/27/21  2131 12/27/21  1614   POC GLUCOSE mg/dl 100 209* 209* 166* 171* 98 284* 270* 155* 246* 146* 215*               Lines/Drains:  Invasive Devices  Report    Peripheral Intravenous Line            Peripheral IV 12/27/21 Right;Ventral (anterior) Hand 3 days          Line            Hemodialysis AV Fistula 02/20/18 Left Forearm 1408 days                      Imaging: No pertinent imaging reviewed      Recent Cultures (last 7 days):         Last 24 Hours Medication List: Current Facility-Administered Medications   Medication Dose Route Frequency Provider Last Rate    acetaminophen  650 mg Oral Q6H PRN Iverson Spatz, NAT      albuterol  2 puff Inhalation Q6H PRN Iverson Spatz, NAT      ALPRAZolam  0 25 mg Oral 4x Daily PRN Iverson Spatz, DPM      ammonium lactate   Topical BID PRN Iverson Spatz, DPM      calcium carbonate  500 mg Oral TID PRN Mary Whittington MD      carvedilol  25 mg Oral BID With Meals Iverson Spatz, DPM      cinacalcet  30 mg Oral Daily [facility]      Diclofenac Sodium  2 g Topical 4x Daily Mary Whittington MD      fentaNYL  25 mcg Intravenous Q5 Min PRN Maureen Brown CRNA      gabapentin  100 mg Oral TID Iverson Spatz, DPM      heparin (porcine)  5,000 Units Subcutaneous Q12H [facility]      hydrALAZINE  5 mg Intravenous Q6H PRN Iverson Spatz, DPM      HYDROmorphone  1 mg Intravenous Q3H PRN Mary Whittington MD      insulin glargine  20 Units Subcutaneous HS Iverson Spatz, DPM      insulin lispro  1-5 Units Subcutaneous TID [facility] Iverson Spatz, DPM      insulin lispro  3 Units Subcutaneous TID With Meals Iverson Spatz, DPM      levothyroxine  50 mcg Oral Daily Iverson Spatz, NAT      lidocaine  1 patch Topical Daily Mary Whittington MD      melatonin  3 mg Oral HS Iverson Spatz, DPM      metoclopramide  10 mg Intravenous Q6H PRN Melita Beckwith PA-C      NIFEdipine  30 mg Oral Daily [facility]      nystatin   Topical BID Iverson Spatz, DPM      ondansetron  4 mg Intravenous Once PRN Maureen Brown CRNA      oxyCODONE-acetaminophen  1 tablet Oral Q6H PRN Iverson Spatz, DPM      oxyCODONE-acetaminophen  2 tablet Oral Q6H PRN Mary Whittington MD      pantoprazole  40 mg Oral Early Morning Iverson Spatz, DPM      polyethylene glycol  17 g Oral Daily PRN Iverson Spatz, NAT      senna  2 tablet Oral HS PRN Iverson Spatz, NAT      sertraline  75 mg Oral Daily Iverson Spatz, [facility]      sevelamer  1,600 mg Oral TID With Meals Bee Olivas  Ngoc Grant DPM      torsemide  100 mg Oral Daily Elvira Rivera      warfarin  9 mg Oral Once per day on Mon Tue Wed Thu Fri Sat Roxanne Abdullahi MD          Today, Patient Was Seen By: Serge Tavares DO    **Please Note: This note may have been constructed using a voice recognition system  **

## 2021-12-30 NOTE — CASE MANAGEMENT
Case Management Discharge Planning Note    Patient name Barb Tse  Location [facility] 933/[facility] 933-01 MRN [id number]  : 1965 Date 2021       Current Admission Date: 2021  Current Admission Diagnosis:Right foot ulceration with osteomyelitis   Patient Active Problem List    Diagnosis Date Noted    Right foot ulceration with osteomyelitis 2021    Pruritus 2021    Postop check 2021    Sigmoid diverticulitis 2021    Pneumonia 2021    Chronic anticoagulation 2021    Essential hypertension 2021    Arteriovenous fistula for hemodialysis in place, primary [facility]) 2021    Healthcare-associated pneumonia 2021    Right foot pain 2021    A-V fistula ([facility] ) 2021    Chronic pain syndrome 2021    Bilateral primary osteoarthritis of knee 2021    Bilateral patellofemoral syndrome 2021    Tinea unguium 2020    S/P foot surgery, right 2020    History of claustrophobia 2020    Morbid obesity 2020    Hyperlipidemia 2020    Diabetic polyneuropathy associated with type 2 diabetes mellitus ([facility] ) 2020    [facility]    Corns and callosities 2020    History of amputation of lesser toe of right foot ([facility] ) 2020    Flu-like symptoms 2020    Lumbar radiculopathy 2020    Low back pain with sciatica 2020    Hemodialysis-associated hypotension 2019    Hyponatremia 2019    Parenchymal renal hypertension 2019    Candidiasis of breast 2019    Anemia of chronic disease 2019    Disruption of internal surgical wound 2019    Dyspnea 2019    Secondary hyperparathyroidism of renal origin ([facility] ) 2019    Nausea and vomiting 2019    Meningioma ([facility] ) 2019    Concussion without loss of consciousness 03/15/2019    Colon cancer screening 2019    Gastroesophageal reflux disease 03/05/2019    Primary osteoarthritis of right knee 10/25/2018    [facility] 10/23/2018    Knee pain 10/22/2018    Ambulatory dysfunction 10/22/2018    Anxiety disorder 04/06/2017    Acquired hypothyroidism 07/22/2016    Glaucoma 07/01/2016    ESRD on hemodialysis 07/01/2016    Abnormal uterine bleeding 02/15/2016    History of venous thromboembolism 02/14/2016    Pulmonary embolus [facility] 10/30/2015    Cervical dysplasia 05/03/2015    Chronic endometritis 10/17/2014    Tinea pedis 10/02/2014    Benign neoplasm of skin 09/25/2014    Insulin-dependent diabetes mellitus with neuropathy 09/04/2014    Phlebitis and thrombophlebitis of superficial vessels of lower extremities 04/10/2014    Limb pain 11/25/2013    Mononeuritis of upper limb, unspecified laterality 11/25/2013    Legal blindness, as defined in [place] [facility] 59/92/9095    Complication from renal dialysis device 04/22/2013    Essential hypertension 10/15/2012    Cardiomyopathy ([facility] ) 09/26/2012    Esophageal reflux 08/20/2012    Allergic rhinitis 08/20/2012      LOS (days): 10  Geometric Mean LOS (GMLOS) (days): 5 80  Days to GMLOS:-4 1     OBJECTIVE:  Risk of Unplanned Readmission Score: 65         Current admission status: Inpatient   Preferred Pharmacy:   [facility]/pharmacy #[id number] [address]  [address] [place] [address]  Phone: [phone] Fax: [phone] [facility], [address]  [address] [facility] [address]  Phone: [phone] Fax: [phone]    Primary Care Provider: Amanda Hoskins MD    Primary Insurance: MEDICARE  Secondary Insurance: [facility]    DISCHARGE DETAILS:  Additional Comments: CM met with pt to discuss the staus of the referrals  CM notified pt that she was not accepted at [facility] or [facility]   CM inquired if she had a nursing home that she was comfiortable going  Per pt she does not have one and she does not feel comfortable going to  [facility] due to her sister passing away at a nursing home after an amputation  CM notified pt that her choices at this point would be to go to a ursing home or transition home with either OP therapy or Home Therapy  Pt expressed dificulty as she has 15 steps to enter her home and her SO is unable to assist her due to a bad back  Pt stated that she would talk it over with her SO and notify CM of her choice in the morning  CM will follow

## 2021-12-30 NOTE — PROGRESS NOTES
This patient's vancomycin therapy has been discontinued  Thank you for this consult; Pharmacy will sign off now      Gerber Tavera, BettyD

## 2021-12-30 NOTE — ASSESSMENT & PLAN NOTE
Warfarin for anticoagulation  Goal INR 2-3, currently 1 22, will continue 9 mg for now and adjust if needed  Monitor INR

## 2021-12-31 LAB
ANION GAP SERPL CALCULATED.3IONS-SCNC: 4 MMOL/L (ref 4–13)
BASOPHILS # BLD AUTO: 0.03 THOUSANDS/ΜL (ref 0–0.1)
BASOPHILS NFR BLD AUTO: 0 % (ref 0–1)
BUN SERPL-MCNC: 50 MG/DL (ref 5–25)
CALCIUM SERPL-MCNC: 9.1 MG/DL (ref 8.3–10.1)
CHLORIDE SERPL-SCNC: 103 MMOL/L (ref 100–108)
CO2 SERPL-SCNC: 28 MMOL/L (ref 21–32)
CREAT SERPL-MCNC: 5.72 MG/DL (ref 0.6–1.3)
EOSINOPHIL # BLD AUTO: 0.37 THOUSAND/ΜL (ref 0–0.61)
EOSINOPHIL NFR BLD AUTO: 5 % (ref 0–6)
ERYTHROCYTE [DISTWIDTH] IN BLOOD BY AUTOMATED COUNT: 15 % (ref 11.6–15.1)
GFR SERPL CREATININE-BSD FRML MDRD: 7 ML/MIN/1.73SQ M
GLUCOSE SERPL-MCNC: 120 MG/DL (ref 65–140)
GLUCOSE SERPL-MCNC: 187 MG/DL (ref 65–140)
HCT VFR BLD AUTO: 23.1 % (ref 34.8–46.1)
HGB BLD-MCNC: 7.3 G/DL (ref 11.5–15.4)
IMM GRANULOCYTES # BLD AUTO: 0.04 THOUSAND/UL (ref 0–0.2)
IMM GRANULOCYTES NFR BLD AUTO: 1 % (ref 0–2)
INR PPP: 1.24 (ref 0.84–1.19)
LYMPHOCYTES # BLD AUTO: 1.34 THOUSANDS/ΜL (ref 0.6–4.47)
LYMPHOCYTES NFR BLD AUTO: 20 % (ref 14–44)
MCH RBC QN AUTO: 30.4 PG (ref 26.8–34.3)
MCHC RBC AUTO-ENTMCNC: 31.6 G/DL (ref 31.4–37.4)
MCV RBC AUTO: 96 FL (ref 82–98)
MONOCYTES # BLD AUTO: 0.59 THOUSAND/ΜL (ref 0.17–1.22)
MONOCYTES NFR BLD AUTO: 9 % (ref 4–12)
NEUTROPHILS # BLD AUTO: 4.48 THOUSANDS/ΜL (ref 1.85–7.62)
NEUTS SEG NFR BLD AUTO: 65 % (ref 43–75)
NRBC BLD AUTO-RTO: 0 /100 WBCS
PLATELET # BLD AUTO: 195 THOUSANDS/UL (ref 149–390)
PMV BLD AUTO: 9.9 FL (ref 8.9–12.7)
POTASSIUM SERPL-SCNC: 4.8 MMOL/L (ref 3.5–5.3)
PROTHROMBIN TIME: 15.1 SECONDS (ref 11.6–14.5)
RBC # BLD AUTO: 2.4 MILLION/UL (ref 3.81–5.12)
SODIUM SERPL-SCNC: 135 MMOL/L (ref 136–145)
WBC # BLD AUTO: 6.85 THOUSAND/UL (ref 4.31–10.16)

## 2021-12-31 PROCEDURE — NC001 PR NO CHARGE: Performed by: PODIATRIST

## 2021-12-31 PROCEDURE — 85025 COMPLETE CBC W/AUTO DIFF WBC: CPT | Performed by: INTERNAL MEDICINE

## 2021-12-31 PROCEDURE — 97530 THERAPEUTIC ACTIVITIES: CPT

## 2021-12-31 PROCEDURE — 99232 SBSQ HOSP IP/OBS MODERATE 35: CPT | Performed by: INTERNAL MEDICINE

## 2021-12-31 PROCEDURE — 97110 THERAPEUTIC EXERCISES: CPT

## 2021-12-31 PROCEDURE — 82948 REAGENT STRIP/BLOOD GLUCOSE: CPT

## 2021-12-31 PROCEDURE — 80048 BASIC METABOLIC PNL TOTAL CA: CPT | Performed by: INTERNAL MEDICINE

## 2021-12-31 PROCEDURE — 85610 PROTHROMBIN TIME: CPT | Performed by: INTERNAL MEDICINE

## 2021-12-31 RX ADMIN — METHYLNALTREXONE BROMIDE 4 MG: 12 INJECTION, SOLUTION SUBCUTANEOUS at 16:02

## 2021-12-31 RX ADMIN — PANTOPRAZOLE SODIUM 40 MG: 40 TABLET, DELAYED RELEASE ORAL at 05:37

## 2021-12-31 RX ADMIN — INSULIN LISPRO 2 UNITS: 100 INJECTION, SOLUTION INTRAVENOUS; SUBCUTANEOUS at 16:02

## 2021-12-31 RX ADMIN — CARVEDILOL 25 MG: 25 TABLET, FILM COATED ORAL at 09:29

## 2021-12-31 RX ADMIN — INSULIN LISPRO 3 UNITS: 100 INJECTION, SOLUTION INTRAVENOUS; SUBCUTANEOUS at 11:40

## 2021-12-31 RX ADMIN — HYDROMORPHONE HYDROCHLORIDE 1 MG: 1 INJECTION, SOLUTION INTRAMUSCULAR; INTRAVENOUS; SUBCUTANEOUS at 18:56

## 2021-12-31 RX ADMIN — DICLOFENAC SODIUM 2 G: 10 GEL TOPICAL at 16:01

## 2021-12-31 RX ADMIN — INSULIN LISPRO 3 UNITS: 100 INJECTION, SOLUTION INTRAVENOUS; SUBCUTANEOUS at 09:33

## 2021-12-31 RX ADMIN — SEVELAMER HYDROCHLORIDE 1600 MG: 800 TABLET, FILM COATED PARENTERAL at 16:00

## 2021-12-31 RX ADMIN — OXYCODONE HYDROCHLORIDE AND ACETAMINOPHEN 2 TABLET: 5; 325 TABLET ORAL at 09:30

## 2021-12-31 RX ADMIN — INSULIN GLARGINE 20 UNITS: 100 INJECTION, SOLUTION SUBCUTANEOUS at 21:28

## 2021-12-31 RX ADMIN — WARFARIN SODIUM 9 MG: 7.5 TABLET ORAL at 16:00

## 2021-12-31 RX ADMIN — DICLOFENAC SODIUM 2 G: 10 GEL TOPICAL at 09:32

## 2021-12-31 RX ADMIN — HEPARIN SODIUM 5000 UNITS: 5000 INJECTION INTRAVENOUS; SUBCUTANEOUS at 09:28

## 2021-12-31 RX ADMIN — SERTRALINE HYDROCHLORIDE 75 MG: 50 TABLET ORAL at 09:28

## 2021-12-31 RX ADMIN — HYDROMORPHONE HYDROCHLORIDE 1 MG: 1 INJECTION, SOLUTION INTRAMUSCULAR; INTRAVENOUS; SUBCUTANEOUS at 11:39

## 2021-12-31 RX ADMIN — TORSEMIDE 100 MG: 20 TABLET ORAL at 09:28

## 2021-12-31 RX ADMIN — GABAPENTIN 100 MG: 100 CAPSULE ORAL at 21:28

## 2021-12-31 RX ADMIN — HYDROMORPHONE HYDROCHLORIDE 1 MG: 1 INJECTION, SOLUTION INTRAMUSCULAR; INTRAVENOUS; SUBCUTANEOUS at 05:39

## 2021-12-31 RX ADMIN — CARVEDILOL 25 MG: 25 TABLET, FILM COATED ORAL at 16:00

## 2021-12-31 RX ADMIN — INSULIN LISPRO 3 UNITS: 100 INJECTION, SOLUTION INTRAVENOUS; SUBCUTANEOUS at 16:02

## 2021-12-31 RX ADMIN — GABAPENTIN 100 MG: 100 CAPSULE ORAL at 09:28

## 2021-12-31 RX ADMIN — GABAPENTIN 100 MG: 100 CAPSULE ORAL at 16:01

## 2021-12-31 RX ADMIN — SEVELAMER HYDROCHLORIDE 1600 MG: 800 TABLET, FILM COATED PARENTERAL at 09:28

## 2021-12-31 RX ADMIN — LEVOTHYROXINE SODIUM 50 MCG: 50 TABLET ORAL at 09:28

## 2021-12-31 RX ADMIN — HEPARIN SODIUM 5000 UNITS: 5000 INJECTION INTRAVENOUS; SUBCUTANEOUS at 21:28

## 2021-12-31 RX ADMIN — Medication 3 MG: at 21:28

## 2021-12-31 RX ADMIN — NIFEDIPINE 30 MG: 30 TABLET, FILM COATED, EXTENDED RELEASE ORAL at 09:32

## 2021-12-31 RX ADMIN — OXYCODONE HYDROCHLORIDE AND ACETAMINOPHEN 2 TABLET: 5; 325 TABLET ORAL at 16:01

## 2021-12-31 RX ADMIN — ALPRAZOLAM 0.25 MG: 0.25 TABLET ORAL at 21:39

## 2021-12-31 RX ADMIN — SEVELAMER HYDROCHLORIDE 1600 MG: 800 TABLET, FILM COATED PARENTERAL at 11:39

## 2021-12-31 RX ADMIN — OXYCODONE HYDROCHLORIDE AND ACETAMINOPHEN 2 TABLET: 5; 325 TABLET ORAL at 00:09

## 2021-12-31 RX ADMIN — DICLOFENAC SODIUM 2 G: 10 GEL TOPICAL at 21:28

## 2021-12-31 NOTE — ASSESSMENT & PLAN NOTE
Status post transmetatarsal amputation with Podiatry  Patient no monitor off antibiotics Infectious Disease input noted  Podiatry input noted patient is noncompliant with nonweightbearing status in this was discussed with the patient and strongly recommended nonweightbearing status for proper healing  Physical therapy  -pending placement    12/31/21:  Reviewed photos from Podiatry earlier, appears well healed without any erythema, no concern for infection currently, continue to monitor off of antibiotics

## 2021-12-31 NOTE — PLAN OF CARE
Problem: PHYSICAL THERAPY ADULT  Goal: Performs mobility at highest level of function for planned discharge setting  See evaluation for individualized goals  Description: Treatment/Interventions: ADL retraining,Functional transfer training,LE strengthening/ROM,Patient/family training,Equipment eval/education,Bed mobility,Gait training,Spoke to nursing,Spoke to case management,OT  Equipment Recommended: [facility] [place] [facility]       See flowsheet documentation for full assessment, interventions and recommendations  Outcome: Progressing  Note: Prognosis: Fair  Problem List: Decreased strength,Decreased endurance,Impaired balance,Decreased mobility,Pain,Decreased safety awareness  Assessment: Pt seen for PT session today with a focus on functional transfers, standing tolerance, LE strengthening, and education  Pt continues to make slow progress towards mobility goals, limited by difficulty maintaining her WBS as well as fear of falling  At this time, pt requires modAx1 for STS transfers with RW  Once up, pt is able to stand at [facility] with supervision assist  Attempted to practice hopping today, however pt unable to complete without putting weight through RLE despite max VCs to avoid  Further attempts at hopping deferred- time spent educating pt on the importance of following her [address] restrictions in order to let her foot heal properly  Pt educated on seated therex program, which she was able to complete with good technique  Pt would benefit from continued acute skilled PT to address above deficits  Continuing to recommend rehab upon d/c   Barriers to Discharge: Inaccessible home environment (pt has 15 [address] )        PT Discharge Recommendation: Post acute rehabilitation services          See flowsheet documentation for full assessment

## 2021-12-31 NOTE — QUICK NOTE
Called by RN, report pt was sitting on the chair an dslid down and fell on the floor  Pt reports that demi one is making a big deal out of nothing  She is very anxious at baseline and now seems really anxious upon arrival rather emotional  She is upset that contact precautions were just placed on her door  BPis 179/71 HR in the 80's  She denies dizziness or lightheadedness   She does not feel she banged her surgical foot there is no drainage noted on dressing and appears stable  She states she had to place a brief second of weight on it  Pt did not hit her head landed on buttocks  No need for any further imaging at this time   Pt is alert and oriented x4

## 2021-12-31 NOTE — PROGRESS NOTES
NEPHROLOGY PROGRESS NOTE   Rosalva Segura 49 y o  female MRN: [id number]  Unit/Bed#: [facility] 933-01 Encounter: [id number]  Reason for Consult:  End-stage renal disease    ASSESSMENT/PLAN:  1  End-stage renal disease, maintenance hemodialysis Tuesday Thursday Saturday, [address]  2  Anemia chronic kidney disease, currently not on MITCHELL therapy given history of DVT  3  Hypertension, blood pressure overall appears stable  4  Right foot osteomyelitis status post TMA  5  CKD associated mineral bone disorder, continue with Renagel as well Sensipar     PLAN:  · Will continue with maintenance hemodialysis, Tuesday Thursday Saturday  · Blood pressure volume status appears stable  · Complains of constipation, continue with MiraLax and senna as needed  · No other changes in current regimen    SUBJECTIVE:  Seen and examined  Patient complaining of constipation  No active chest pain or shortness of breath  Appetite appears stable  Review of Systems    OBJECTIVE:  Current Weight: Weight - Scale: 122 kg (268 lb 15 4 oz)  Vitals:    12/30/21 1510 12/30/21 1807 12/30/21 2000 12/31/21 0849   BP: 108/81 120/77 (!) 179/71 120/63   BP Location:       Pulse:   80    Resp:   18    Temp: (!) 97 4 °F (36 3 °C)  97 9 °F (36 6 °C) 97 6 °F (36 4 °C)   TempSrc:   Oral    SpO2:   93%    Weight:       Height:           Intake/Output Summary (Last 24 hours) at 12/31/2021 1226  Last data filed at 12/31/2021 0901  Gross per 24 hour   Intake 480 ml   Output 0 ml   Net 480 ml       Physical Exam  Constitutional:       Appearance: She is obese  Eyes:      General: No scleral icterus  Cardiovascular:      Rate and Rhythm: Normal rate and regular rhythm  Pulmonary:      Effort: Pulmonary effort is normal       Breath sounds: Normal breath sounds  Abdominal:      General: There is distension  Palpations: Abdomen is soft  Musculoskeletal:      Right lower leg: No edema  Left lower leg: No edema        Comments: AV access with positive bruit and thrill   Skin:     General: Skin is warm and dry  Findings: No rash  Neurological:      Mental Status: She is alert and oriented to person, place, and time           Medications:    Current Facility-Administered Medications:     acetaminophen (TYLENOL) tablet 650 mg, 650 mg, Oral, Q6H PRN, Daleen Emily, DPM, 650 mg at 12/28/21 1721    albuterol (PROVENTIL HFA,VENTOLIN HFA) inhaler 2 puff, 2 puff, Inhalation, Q6H PRN, Daleen Emily, DPM    ALPRAZolam Shellie Ron) tablet 0 25 mg, 0 25 mg, Oral, 4x Daily PRN, Daleen Emily, DPM, 0 25 mg at 12/30/21 2159    ammonium lactate (LAC-HYDRIN) 12 % cream, , Topical, BID PRN, Daleen Emily, DPM, Given at 12/21/21 2207    calcium carbonate (TUMS) chewable tablet 500 mg, 500 mg, Oral, TID PRN, Rajiv Blount MD, 500 mg at 12/30/21 2343    carvedilol (COREG) tablet 25 mg, 25 mg, Oral, BID With Meals, Daleen Emily, DPM, 25 mg at 12/31/21 0929    cinacalcet (SENSIPAR) tablet 30 mg, 30 mg, Oral, Daily, Daleen Emily, DPM, 30 mg at 12/30/21 1804    Diclofenac Sodium (VOLTAREN) 1 % topical gel 2 g, 2 g, Topical, 4x Daily, Rajiv Blount MD, 2 g at 12/31/21 0932    fentaNYL (SUBLIMAZE) injection 25 mcg, 25 mcg, Intravenous, Q5 Min PRN, Eleanor Najjar, CRNA    gabapentin (NEURONTIN) capsule 100 mg, 100 mg, Oral, TID, Daleen Emily, DPM, 100 mg at 12/31/21 1613    heparin (porcine) subcutaneous injection 5,000 Units, 5,000 Units, Subcutaneous, Q12H [address], Daleen Emily, DPM, 5,000 Units at 12/31/21 8155    hydrALAZINE (APRESOLINE) injection 5 mg, 5 mg, Intravenous, Q6H PRN, Daleen Emily, DPM, 5 mg at 12/21/21 0047    HYDROmorphone (DILAUDID) injection 1 mg, 1 mg, Intravenous, Q3H PRN, Rajiv Blount MD, 1 mg at 12/31/21 1139    insulin glargine (LANTUS) subcutaneous injection 20 Units 0 2 mL, 20 Units, Subcutaneous, HS, Marivel Diaz DPM, 20 Units at 12/30/21 5489    insulin lispro (HumaLOG) 100 units/mL subcutaneous injection 1-5 Units, 1-5 Units, Subcutaneous, TID AC, 1 Units at 12/30/21 1803 **AND** Fingerstick Glucose (POCT), , , TID AC, Moise Mims DPM    insulin lispro (HumaLOG) 100 units/mL subcutaneous injection 3 Units, 3 Units, Subcutaneous, TID With Meals, Moise Mims DPM, 3 Units at 12/31/21 1140    levothyroxine tablet 50 mcg, 50 mcg, Oral, Daily, Moise Mims DPM, 50 mcg at 12/31/21 2563    lidocaine (LIDODERM) 5 % patch 1 patch, 1 patch, Topical, Daily, Leif Vickers MD, 1 patch at 12/27/21 1436    melatonin tablet 3 mg, 3 mg, Oral, HS, Moise Mims DPM, 3 mg at 12/30/21 2159    metoclopramide (REGLAN) injection 10 mg, 10 mg, Intravenous, Q6H PRN, Melita Beckwith PA-C    NIFEdipine (PROCARDIA XL) 24 hr tablet 30 mg, 30 mg, Oral, Daily, Moise Mims DPM, 30 mg at 12/31/21 0932    nystatin (MYCOSTATIN) powder, , Topical, BID, Moise Mims DPM, Given at 12/30/21 1805    ondansetron [facility]) injection 4 mg, 4 mg, Intravenous, Once PRN, Lizzette Alberto CRNA    oxyCODONE-acetaminophen (PERCOCET) 5-325 mg per tablet 1 tablet, 1 tablet, Oral, Q6H PRN, Moise Mims DPM, 1 tablet at 12/29/21 0649    oxyCODONE-acetaminophen (PERCOCET) 5-325 mg per tablet 2 tablet, 2 tablet, Oral, Q6H PRN, Leif Vickers MD, 2 tablet at 12/31/21 0930    pantoprazole (PROTONIX) EC tablet 40 mg, 40 mg, Oral, Early Morning, Moise Mims DPM, 40 mg at 12/31/21 0537    polyethylene glycol (MIRALAX) packet 17 g, 17 g, Oral, Daily PRN, Moise Mims DPM    senna (SENOKOT) tablet 17 2 mg, 2 tablet, Oral, HS PRN, Moise Mims DPM    sertraline (ZOLOFT) tablet 75 mg, 75 mg, Oral, Daily, Moise Mims DPM, 75 mg at 12/31/21 5581    sevelamer (RENAGEL) tablet 1,600 mg, 1,600 mg, Oral, TID With Meals, Moise Mims DPM, 1,600 mg at 12/31/21 1139    torsemide (DEMADEX) tablet 100 mg, 100 mg, Oral, Daily, WILTON DavisM, 100 mg at 12/31/21 4112    warfarin (COUMADIN) tablet 9 mg, 9 mg, Oral, Once per day on Mon Tue Wed Thu Fri Sat, Leif Vickers MD, 9 mg at 12/30/21 1811    Laboratory Results:  Results from last 7 days   Lab Units 12/31/21  0545 12/30/21  0843 12/29/21  0721 12/28/21  0931 12/27/21  0650 12/26/21  1501 12/26/21  0823 12/25/21  0640   WBC Thousand/uL 6 85  --  7 16 9 02 9 42  --  6 78 6 03   HEMOGLOBIN g/dL 7 3* 7 0* 7 0* 6 4* 7 0*  --  7 6* 7 5*   HEMATOCRIT % 23 1* 21 0* 20 8* 20 2* 21 5*  --  23 3* 23 7*   PLATELETS Thousands/uL 195  --  173 210 250  --  224 217   POTASSIUM mmol/L 4 8  --  4 4 4 7 4 5 4 2 5 7* 5 7*   CHLORIDE mmol/L 103  --  100 101 103 101 105 106   CO2 mmol/L 28  --  29 23 25 27 22 25   BUN mg/dL 50*  --  31* 48* 36* 30* 74* 59*   CREATININE mg/dL 5 72*  --  5 92* 8 17* 6 11* 4 78* 9 70* 8 40*   CALCIUM mg/dL 9 1  --  8 9 8 8 8 2* 8 2* 8 7 8 8

## 2021-12-31 NOTE — PROGRESS NOTES
[address] [facility]  Progress Note Tiny Section 1965, 49 y o  female MRN: [id number]  Unit/Bed#: [facility] 933-01 Encounter: [id number]  Primary Care Provider: Monica Pinedo MD   Date and time admitted to hospital: 12/20/2021  3:07 PM    Essential hypertension  Assessment & Plan  Monitor blood pressures  Avoid hypotension    Insulin-dependent diabetes mellitus with neuropathy  Assessment & Plan  Lab Results   Component Value Date    HGBA1C 7 9 (H) 09/30/2021     Continue basal/prandial insulin w/ additional SSI coverage per Accu-Cheks  Carbohydrate restricted diet  Continue Gabapentin for neuropathy    Hyperlipidemia  Assessment & Plan  Continue Lipitor    Morbid obesity  Assessment & Plan  BMI of 43 41  Lifestyle/diet modifications    Anemia of chronic disease  Assessment & Plan  Monitor hemoglobin    ESRD on hemodialysis  Assessment & Plan  Routine hemodialysis per nephrology  Continue Renagel/Sensipar supplementation    History of venous thromboembolism  Assessment & Plan  Warfarin for anticoagulation  Goal INR 2-3, currently 1 24, will continue 9 mg for now and adjust if needed  Monitor INR    * Right foot ulceration with osteomyelitis  Assessment & Plan  Status post transmetatarsal amputation with Podiatry  Patient no monitor off antibiotics Infectious Disease input noted  Podiatry input noted patient is noncompliant with nonweightbearing status in this was discussed with the patient and strongly recommended nonweightbearing status for proper healing  Physical therapy  -pending placement    12/31/21:  Reviewed photos from Podiatry earlier, appears well healed without any erythema, no concern for infection currently, continue to monitor off of antibiotics          VTE Pharmacologic Prophylaxis:   High Risk (Score >/= 5) - Pharmacological DVT Prophylaxis Ordered: warfarin (Coumadin)  Sequential Compression Devices Ordered      Patient Centered Rounds: I performed bedside rounds with nursing staff today  Discussions with Specialists or Other Care Team Provider:  Podiatry    Education and Discussions with Family / Patient: Updated  (significant other) via phone  Time Spent for Care: 30 minutes  More than 50% of total time spent on counseling and coordination of care as described above  Current Length of Stay: 11 day(s)  Current Patient Status: Inpatient   Certification Statement: The patient will continue to require additional inpatient hospital stay due to Pending placement to rehab  Discharge Plan: Anticipate discharge in 24-48 hrs to rehab facility  Code Status: Level 1 - Full Code    Subjective:   Patient denies any acute complaint, reports pain is well controlled, states she has not been bearing weight on her right foot  She denies any fevers chills night sweats nausea vomiting, is tolerating oral intake without difficulties, is saturating well on room air    Objective:     Vitals:   Temp (24hrs), Av 6 °F (36 4 °C), Min:97 4 °F (36 3 °C), Max:97 9 °F (36 6 °C)    Temp:  [97 4 °F (36 3 °C)-97 9 °F (36 6 °C)] 97 6 °F (36 4 °C)  HR:  [80] 80  Resp:  [18] 18  BP: (108-179)/(63-81) 120/63  SpO2:  [93 %] 93 %  Body mass index is 43 41 kg/m²  Input and Output Summary (last 24 hours): Intake/Output Summary (Last 24 hours) at 2021 1321  Last data filed at 2021 0901  Gross per 24 hour   Intake 480 ml   Output 0 ml   Net 480 ml       Physical Exam:   Physical Exam  Vitals and nursing note reviewed  Constitutional:       General: She is not in acute distress  Appearance: She is well-developed  She is not ill-appearing, toxic-appearing or diaphoretic  HENT:      Head: Normocephalic and atraumatic  Eyes:      General: No scleral icterus  Conjunctiva/sclera: Conjunctivae normal    Cardiovascular:      Rate and Rhythm: Normal rate and regular rhythm  Heart sounds: No murmur heard  No friction rub  No gallop      Pulmonary:      Effort: Pulmonary effort is normal  No respiratory distress  Breath sounds: Normal breath sounds  No stridor  No wheezing, rhonchi or rales  Chest:      Chest wall: No tenderness  Abdominal:      General: There is no distension  Palpations: Abdomen is soft  There is no mass  Tenderness: There is no abdominal tenderness  There is no guarding or rebound  Hernia: No hernia is present  Musculoskeletal:         General: No swelling, tenderness, deformity or signs of injury  Cervical back: Neck supple  Right lower leg: No edema  Left lower leg: No edema  Comments: Status post TMA amputation, dressing in place for for, did review pictures in Podiatry and appears well without any signs of erythema drainage or infection   Skin:     General: Skin is warm and dry  Coloration: Skin is not jaundiced or pale  Findings: No bruising, erythema, lesion or rash  Neurological:      Mental Status: She is alert and oriented to person, place, and time            Additional Data:     Labs:  Results from last 7 days   Lab Units 12/31/21  0545   WBC Thousand/uL 6 85   HEMOGLOBIN g/dL 7 3*   HEMATOCRIT % 23 1*   PLATELETS Thousands/uL 195   NEUTROS PCT % 65   LYMPHS PCT % 20   MONOS PCT % 9   EOS PCT % 5     Results from last 7 days   Lab Units 12/31/21  0545   SODIUM mmol/L 135*   POTASSIUM mmol/L 4 8   CHLORIDE mmol/L 103   CO2 mmol/L 28   BUN mg/dL 50*   CREATININE mg/dL 5 72*   ANION GAP mmol/L 4   CALCIUM mg/dL 9 1   GLUCOSE RANDOM mg/dL 120     Results from last 7 days   Lab Units 12/31/21  0545   INR  1 24*     Results from last 7 days   Lab Units 12/30/21  1653 12/30/21  1235 12/30/21  0744 12/29/21  2144 12/29/21  1701 12/29/21  1137 12/29/21  0829 12/28/21  2104 12/28/21  1708 12/28/21  1321 12/28/21  0855 12/27/21  2131   POC GLUCOSE mg/dl 193* 100 209* 209* 166* 171* 98 284* 270* 155* 246* 146*               Lines/Drains:  Invasive Devices  Report    Peripheral Intravenous Line Peripheral IV 12/31/21 Right Antecubital <1 day          Line            Hemodialysis AV Fistula 02/20/18 Left Forearm 1409 days                      Imaging: No pertinent imaging reviewed      Recent Cultures (last 7 days):         Last 24 Hours Medication List:   Current Facility-Administered Medications   Medication Dose Route Frequency Provider Last Rate    acetaminophen  650 mg Oral Q6H PRN [place] Gather, DPM      albuterol  2 puff Inhalation Q6H PRN [place] Gather, DPM      ALPRAZolam  0 25 mg Oral 4x Daily PRN [place] Gather, DPM      ammonium lactate   Topical BID PRN [place] Gather, DPM      calcium carbonate  500 mg Oral TID PRN Deng Anthony MD      carvedilol  25 mg Oral BID With Meals [place] Gather, DPM      cinacalcet  30 mg Oral Daily [place] G[place], [place]      Diclofenac Sodium  2 g Topical 4x Daily Deng Anthony MD      fentaNYL  25 mcg Intravenous Q5 Min PRN Layla Romero CRNA      gabapentin  100 mg Oral TID [place] Gather, DPM      heparin (porcine)  5,000 Units Subcutaneous Q12H [facility] [place], [place]      hydrALAZINE  5 mg Intravenous Q6H PRN [place] Gather, DPM      HYDROmorphone  1 mg Intravenous Q3H PRN Deng Anthony MD      insulin glargine  20 Units Subcutaneous HS [place] Gather, DPM      insulin lispro  1-5 Units Subcutaneous TID [facility] [place] Gather, DPM      insulin lispro  3 Units Subcutaneous TID With Meals [place] Gather, DPM      levothyroxine  50 mcg Oral Daily [place] Gather, DPM      lidocaine  1 patch Topical Daily Deng Anthony MD      melatonin  3 mg Oral HS [place] Gather, DPM      methylnaltrexone bromide  4 mg Subcutaneous Once Ferdinand Cardona DO      metoclopramide  10 mg Intravenous Q6H PRN Melita Beckwith PA-C      NIFEdipine  30 mg Oral Daily [place] G[place], [place]      nystatin   Topical BID [place] Gather, DPM      ondansetron  4 mg Intravenous Once PRN Layla Romero CRNA      oxyCODONE-acetaminophen  1 tablet Oral Q6H PRN [place] Gather, DPM      oxyCODONE-acetaminophen  2 tablet Oral Q6H PRN Ry Mcadams MD      pantoprazole  40 mg Oral Early Morning Brian Newberry, NAT      polyethylene glycol  17 g Oral Daily PRN Brian Newberry, NAT      senna  2 tablet Oral HS PRN Brian Newberry, DPM      sertraline  75 mg Oral Daily Elvira Renee      sevelamer  1,600 mg Oral TID With Meals Brian Newberry, NAT      torsemide  100 mg Oral Daily Brian Newberry, NAT      warfarin  9 mg Oral Once per day on Mon Tue Wed Thu Fri Sat Ry Mcadams MD          Today, Patient Was Seen By: Jenny Qureshi DO    **Please Note: This note may have been constructed using a voice recognition system  **

## 2021-12-31 NOTE — CASE MANAGEMENT
Case Management Discharge Planning Note    Patient name Farrukh Banks  Location [facility] 9[facility] 933-01 MRN [id number]  : 1965 Date 2021       Current Admission Date: 2021  Current Admission Diagnosis:Right foot ulceration with osteomyelitis   Patient Active Problem List    Diagnosis Date Noted    Right foot ulceration with osteomyelitis 2021    Pruritus 2021    Postop check 2021    Sigmoid diverticulitis 2021    Pneumonia 2021    Chronic anticoagulation 2021    Essential hypertension 2021    Arteriovenous fistula for hemodialysis in place, primary [facility]) 2021    Healthcare-associated pneumonia 2021    Right foot pain 2021    A-V fistula ([facility] ) 2021    Chronic pain syndrome 2021    Bilateral primary osteoarthritis of knee 2021    Bilateral patellofemoral syndrome 2021    Tinea unguium 2020    S/P foot surgery, right 2020    History of claustrophobia 2020    Morbid obesity 2020    Hyperlipidemia 2020    Diabetic polyneuropathy associated with type 2 diabetes mellitus ([facility] ) 2020    [facility]    Corns and callosities 2020    History of amputation of lesser toe of right foot ([facility] ) 2020    Flu-like symptoms 2020    Lumbar radiculopathy 2020    Low back pain with sciatica 2020    Hemodialysis-associated hypotension 2019    Hyponatremia 2019    Parenchymal renal hypertension 2019    Candidiasis of breast 2019    Anemia of chronic disease 2019    Disruption of internal surgical wound 2019    Dyspnea 2019    Secondary hyperparathyroidism of renal origin ([facility] ) 2019    Nausea and vomiting 2019    Meningioma ([facility] ) 2019    Concussion without loss of consciousness 03/15/2019    Colon cancer screening 2019    Gastroesophageal reflux disease 03/05/2019    Primary osteoarthritis of right knee 10/25/2018    Hemodialysis status ([facility] ) 10/23/2018    Knee pain 10/22/2018    Ambulatory dysfunction 10/22/2018    Anxiety disorder 04/06/2017    Acquired hypothyroidism 07/22/2016    Glaucoma 07/01/2016    ESRD on hemodialysis 07/01/2016    Abnormal uterine bleeding 02/15/2016    History of venous thromboembolism 02/14/2016    [facility] 10/30/2015    Cervical dysplasia 05/03/2015    Chronic endometritis 10/17/2014    Tinea pedis 10/02/2014    Benign neoplasm of skin 09/25/2014    Insulin-dependent diabetes mellitus with neuropathy 09/04/2014    Phlebitis and thrombophlebitis of superficial vessels of lower extremities 04/10/2014    Limb pain 11/25/2013    Mononeuritis of upper limb, unspecified laterality 11/25/2013    Legal blindness, as defined in [place] 30/21/2552    Complication from renal dialysis device 04/22/2013    Essential hypertension 10/15/2012    Cardiomyopathy ([email or web address] ) 09/26/2012    Esophageal reflux 08/20/2012    Allergic rhinitis 08/20/2012      LOS (days): 11  Geometric Mean LOS (GMLOS) (days): 5 80  Days to GMLOS:-4 8     OBJECTIVE:  Risk of Unplanned Readmission Score: 65         Current admission status: Inpatient   Preferred Pharmacy:   [id number] - [address] [facility]  [address] [place] [address]  Phone: [phone] Fax: [phone], [address]  [address] [facility] [address]  Phone: [phone] Fax: [phone]    Primary Care Provider: Mickey Marie MD    Primary Insurance: MEDICARE  Secondary Insurance: [id number] [facility]    DISCHARGE DETAILS:    PT requested referral to  AR be resent due to her falling yesterday  Also provided STR list to review    Aware [facility] is following    PT would like referral H[facility], same entered in [facility] IMM Given (Date):: 12/31/21  IMM Given to[de-identified] Patient     Additional Comments: PT request re-referral to [facility]    Also provided SNF list to review

## 2021-12-31 NOTE — PROGRESS NOTES
Podiatry - Progress Note  Patient: Vinod Walton 49 y o  female   MRN: [id number]  PCP: Marlo Maurer MD  Unit/Bed#: [facility] 933-01 Encounter: [id number]  Date Of Visit: 21    ASSESSMENT:    Vinod Walton is a 49 y o  female with:    1  Right medial 1st MTPJ diabetic ulceration- Ol[place] 3 - POA              -s/p R TMA (DOS: 21)  2  T2DM  3  ESRD on HD  4  Obesity      PLAN:    · Dressings changed today; TMA site stable with all sutures intact and no local signs of infection  · Patient remains stable for discharge to rehab from podiatry standpoint  · Continue local wound care  Appreciate nursing assistance with dressing changes  · Elevation and offloading on green foam wedges or pillows when non-ambulatory  · Rest of care per primary team      Weightbearing status: Strict NWB to R foot    SUBJECTIVE:     The patient was seen, evaluated, and assessed at bedside today  The patient was awake, alert, and in no acute distress  No acute events overnight  The patient reports some pain from the right foot  Patient denies N/V/F/chills/SOB/CP  OBJECTIVE:     Vitals:   /63   Pulse 80   Temp 97 6 °F (36 4 °C)   Resp 18   Ht 5' 6" (1 676 m)   Wt 122 kg (268 lb 15 4 oz)   LMP 2016 (Exact Date)   SpO2 93%   BMI 43 41 kg/m²     Temp (24hrs), Av 6 °F (36 4 °C), Min:97 4 °F (36 3 °C), Max:97 9 °F (36 6 °C)      Physical Exam:     General: Alert, cooperative and no distress  Lungs: Non labored breathing  Abdomen: Soft, non-tender  Lower extremity exam:  Cardiovascular status at baseline  Neurological status at baseline  Musculoskeletal status at baseline  No calf tenderness noted  R TMA site skin edges well coapted with all sutures intact  No erythema, no increased skin temperature, no drainage, no lymphangitis from surgical site  Mild edema to surgical site      Clinical Images 21:    R TMA      R TMA      Additional Data:     Labs:    Results from last 7 days   Lab Units 12/31/21  0545   WBC Thousand/uL 6 85   HEMOGLOBIN g/dL 7 3*   HEMATOCRIT % 23 1*   PLATELETS Thousands/uL 195   NEUTROS PCT % 65   LYMPHS PCT % 20   MONOS PCT % 9   EOS PCT % 5     Results from last 7 days   Lab Units 12/31/21  0545   POTASSIUM mmol/L 4 8   CHLORIDE mmol/L 103   CO2 mmol/L 28   BUN mg/dL 50*   CREATININE mg/dL 5 72*   CALCIUM mg/dL 9 1     Results from last 7 days   Lab Units 12/31/21  0545   INR  1 24*       * I Have Reviewed All Lab Data Listed Above  Recent Cultures (last 7 days):               Imaging: I have personally reviewed pertinent films in PACS  EKG, Pathology, and Other Studies: I have personally reviewed pertinent reports  ** Please Note: Portions of the record may have been created with voice recognition software  Occasional wrong word or "sound a like" substitutions may have occurred due to the inherent limitations of voice recognition software  Read the chart carefully and recognize, using context, where substitutions have occurred   **

## 2021-12-31 NOTE — ASSESSMENT & PLAN NOTE
Warfarin for anticoagulation  Goal INR 2-3, currently 1 24, will continue 9 mg for now and adjust if needed  Monitor INR

## 2021-12-31 NOTE — OCCUPATIONAL THERAPY NOTE
Occupational Therapy Progress Note     Patient Name: Junior Restrepo  [id number] Date: 12/31/2021  Problem List  Principal Problem:    Right foot ulceration with osteomyelitis  Active Problems:    History of venous thromboembolism    ESRD on hemodialysis    Anemia of chronic disease    Morbid obesity    Hyperlipidemia    Insulin-dependent diabetes mellitus with neuropathy    Essential hypertension          12/31/21 1121   OT Last Visit   OT Visit Date 12/31/21   Note Type   Note Type Treatment   Restrictions/Precautions   Weight Bearing Precautions Per Order Yes   RLE Weight Bearing Per Order NWB   Other Precautions Fall Risk;Pain; Chair Alarm; Bed Alarm   Lifestyle   Autonomy PTA, pt reports being I with ADLs/IADLs, fnxl mobility, (-)    Reciprocal Relationships Pt lives w her s/o, niece, and her children who are able to assist    Service to Others 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     Intrinsic Gratification Pt enjoys spending time w her son    Pain Assessment   Pain Assessment Tool 0-10   Pain Score 7   Pain Location/Orientation Location: Head  (+ RLE)   Hospital Pain Intervention(s) Repositioned; Ambulation/increased activity   ADL   Where Assessed Edge of bed   LB Dressing Assistance 5  Supervision/Setup   LB Dressing Deficit Don/doff L sock; Don/doff L shoe   Bed Mobility   Supine to Sit 5  Supervision   Additional items HOB elevated   Sit to Supine Unable to assess   Transfers   Sit to Stand 3  Moderate assistance   Additional items Assist x 1; Increased time required;Verbal cues   Stand to Sit 3  Moderate assistance   Additional items Assist x 1; Increased time required;Verbal cues   Stand pivot 3  Moderate assistance   Additional items Assist x 1   Additional Comments Transfers with RW    Cognition   Arousal/Participation Responsive; Cooperative   Attention Attends with cues to redirect   Orientation Level Oriented X4   Memory Decreased recall of precautions   Following Commands Follows one step commands with increased time or repetition   Comments Pt cooperative during session, poor c/o of precautions    Activity Tolerance   Activity Tolerance Patient limited by fatigue;Patient limited by pain   Medical Staff Made Aware RN clearance for session    Assessment   Assessment Patient participated in Skilled OT session this date with interventions consisting of ADL re training with the use of correct body mechnaics, safety awareness and fall prevention techniques,  therapeutic activities to: increase activity tolerance, increase dynamic sit/ stand balance during functional activity  and increase OOB/ sitting tolerance   Upon arrival patient was found supine in bed  Pt demonstrated the following tasks: S sup to sit, MOD A STS and SPT with RW  Pt with poor c/o of NWB RLE, despite frequent cueing  Pt dons L sock and shoe with S - pt attempted to don sx shoe from previous hospitalization to RLE; however, does not fit  Pt demonstrates decreased fnxl standing balance and tolerance  Patient continues to be functioning below baseline level, occupational performance remains limited secondary to factors listed above and increased risk for falls and injury  From OT standpoint, recommendation at time of d/c would be STR  Patient to benefit from continued Occupational Therapy treatment while in the hospital to address deficits as defined above and maximize level of functional independence with ADLs and functional mobility  Pt was left in chair with alarm on after session with all current needs met  The patient's raw score on the AM-PAC Daily Activity inpatient short form is 19, standardized score is 40 22, greater than 39 4  Patients at this level are likely to benefit from discharge to home  Please refer to the recommendation of the Occupational Therapist for safe discharge planning  Plan   Treatment Interventions ADL retraining;Functional transfer training;UE strengthening/ROM; Endurance training;Patient/family training;Equipment evaluation/education;Cognitive reorientation; Compensatory technique education;Continued evaluation; Energy conservation; Activityengagement   Goal Expiration Date 01/10/22   OT Treatment Day 1   OT Frequency 3-5x/wk   Recommendation   OT Discharge Recommendation Post acute rehabilitation services   OT - OK to Discharge Yes  (to rehab when medically stable )   AM-PAC Daily Activity Inpatient   Lower Body Dressing 3   Bathing 2   Toileting 3   Upper Body Dressing 3   Grooming 4   Eating 4   Daily Activity Raw Score 19   Daily Activity Standardized Score (Calc for Raw Score >=11) 40 22   AM-PAC Applied Cognition Inpatient   Following a Speech/Presentation 3   Understanding Ordinary Conversation 4   Taking Medications 4   Remembering Where Things Are Placed or Put Away 4   Remembering List of 4-5 Errands 3   Taking Care of Complicated Tasks 3   Applied Cognition Raw Score 21   Applied Cognition Standardized Score 44 3     Francisca Guillen MS, OTR/L

## 2021-12-31 NOTE — PLAN OF CARE
Problem: OCCUPATIONAL THERAPY ADULT  Goal: Performs self-care activities at highest level of function for planned discharge setting  See evaluation for individualized goals  Description: Treatment Interventions: ADL retraining,Functional transfer training,Endurance training,Patient/family training,Compensatory technique education,Continued evaluation,Energy conservation,Activityengagement          See flowsheet documentation for full assessment, interventions and recommendations  Outcome: Progressing  Note: Limitation: Decreased ADL status,Decreased Safe judgement during ADL,Decreased endurance,Decreased self-care trans,Decreased high-level ADLs  Prognosis: Fair  Assessment: Patient participated in Skilled OT session this date with interventions consisting of ADL re training with the use of correct body mechnaics, safety awareness and fall prevention techniques,  therapeutic activities to: increase activity tolerance, increase dynamic sit/ stand balance during functional activity  and increase OOB/ sitting tolerance   Upon arrival patient was found supine in bed  Pt demonstrated the following tasks: S sup to sit, MOD A STS and SPT with RW  Pt with poor c/o of NWB RLE, despite frequent cueing  Pt dons L sock and shoe with S - pt attempted to don sx shoe from previous hospitalization to RLE; however, does not fit  Pt demonstrates decreased fnxl standing balance and tolerance  Patient continues to be functioning below baseline level, occupational performance remains limited secondary to factors listed above and increased risk for falls and injury  From OT standpoint, recommendation at time of d/c would be STR  Patient to benefit from continued Occupational Therapy treatment while in the hospital to address deficits as defined above and maximize level of functional independence with ADLs and functional mobility  Pt was left in chair with alarm on after session with all current needs met   The patient's raw score on the AM-PAC Daily Activity inpatient short form is 19, standardized score is 40 22, greater than 39 4  Patients at this level are likely to benefit from discharge to home  Please refer to the recommendation of the Occupational Therapist for safe discharge planning       OT Discharge Recommendation: Post acute rehabilitation services  OT - OK to Discharge: Yes (to rehab when medically stable )

## 2021-12-31 NOTE — PHYSICAL THERAPY NOTE
Physical Therapy Treatment Note    Patient's Name: Vinod Walton  : 21 1342   PT Last Visit   PT Visit Date 21   Note Type   Note Type Treatment   Pain Assessment   Pain Assessment Tool 0-10   Pain Score No Pain   Restrictions/Precautions   Weight Bearing Precautions Per Order Yes   RLE Weight Bearing Per Order NWB   Other Precautions WBS; Fall Risk;Pain; Chair Alarm; Bed Alarm   General   Chart Reviewed Yes   Family/Caregiver Present No   Cognition   Overall Cognitive Status WFL   Orientation Level Oriented X4   Memory Decreased recall of precautions   Following Commands Follows one step commands without difficulty   Comments pt is overall pleasant and cooperative, but continues to require max VCs to maintain NWBing on RLE during mobility   Subjective   Subjective "I'm just afraid of falling"   Bed Mobility   Sit to Supine 5  Supervision   Additional items HOB elevated; Increased time required   Transfers   Sit to Stand 3  Moderate assistance   Additional items Assist x 1; Increased time required;Verbal cues   Stand to Sit 3  Moderate assistance   Additional items Assist x 1; Increased time required;Verbal cues   Additional Comments Performed 2 STS transfers with RW- pt with improved compliance with NWBing restrictions during 2nd stand, but still requires max VCs   Ambulation/Elevation   Gait pattern Not appropriate   Distance Attempted to hop with RW, during which pt was unable to maintain NWBing on RLE- deferred further ambulation, but educated pt on the importance of maintaining WBing restrictions to allow for proper healing   Balance   Static Sitting Fair +   Dynamic Sitting Fair   Static Standing Poor +   Dynamic Standing Poor   Activity Tolerance   Activity Tolerance Patient limited by fatigue   Nurse Made Aware ok to see per RN   Exercises   Knee AROM [employer]; Bilateral  (2x15)   Ankle Pumps Sitting;20 reps;AROM; Left   Marching Sitting;20 reps;AROM; Bilateral Balance training  Able to stand at RW ~1 minute x2 trials with supervision assist   Assessment   Prognosis Fair   Problem List Decreased strength;Decreased endurance; Impaired balance;Decreased mobility;Pain;Decreased safety awareness   Assessment Pt seen for PT session today with a focus on functional transfers, standing tolerance, LE strengthening, and education  Pt continues to make slow progress towards mobility goals, limited by difficulty maintaining her WBS as well as fear of falling  At this time, pt requires modAx1 for STS transfers with RW  Once up, pt is able to stand at [facility] with supervision assist  Attempted to practice hopping today, however pt unable to complete without putting weight through RLE despite max VCs to avoid  Further attempts at hopping deferred- time spent educating pt on the importance of following her [address] restrictions in order to let her foot heal properly  Pt educated on seated therex program, which she was able to complete with good technique  Pt would benefit from continued acute skilled PT to address above deficits  Continuing to recommend rehab upon d/c  Goals   Patient Goals to get stronger   STG Expiration Date 01/10/22   PT Treatment Day 2   Plan   Treatment/Interventions Functional transfer training;LE strengthening/ROM; Elevations; Therapeutic exercise; Endurance training;Equipment eval/education; Bed mobility;Gait training;Spoke to nursing;OT   Progress Progressing toward goals   PT Frequency 3-5x/wk   Recommendation   PT Discharge Recommendation Post acute rehabilitation services   Equipment Recommended Wheelchair;Walker   AM-PAC Basic Mobility Inpatient   Turning in Bed Without Bedrails 3   Lying on Back to Sitting on Edge of Flat Bed 3   Moving Bed to Chair 3   Standing Up From Chair 3   Walk in Room 2   Climb 3-5 Stairs 1   Basic Mobility Inpatient Raw Score 15   Basic Mobility Standardized Score 36 97   Highest Level Of Mobility   [facility] Goal 4: Move to chair/commode [facility] Highest Level of Mobility 5: Stand (1 or more minutes)   -HLM Goal Achieved Yes   Education   Education Provided Other;Home exercise program  (WBS)   End of Consult   Patient Position at End of Consult Supine;Bed/Chair alarm activated; All needs within reach       Blanche Blackburn, PT, DPT

## 2022-01-01 LAB
ERYTHROCYTE [DISTWIDTH] IN BLOOD BY AUTOMATED COUNT: 14.8 % (ref 11.6–15.1)
GLUCOSE SERPL-MCNC: 119 MG/DL (ref 65–140)
GLUCOSE SERPL-MCNC: 155 MG/DL (ref 65–140)
GLUCOSE SERPL-MCNC: 186 MG/DL (ref 65–140)
GLUCOSE SERPL-MCNC: 221 MG/DL (ref 65–140)
GLUCOSE SERPL-MCNC: 232 MG/DL (ref 65–140)
HCT VFR BLD AUTO: 23.1 % (ref 34.8–46.1)
HGB BLD-MCNC: 7.4 G/DL (ref 11.5–15.4)
INR PPP: 1.28 (ref 0.84–1.19)
MCH RBC QN AUTO: 30.8 PG (ref 26.8–34.3)
MCHC RBC AUTO-ENTMCNC: 32 G/DL (ref 31.4–37.4)
MCV RBC AUTO: 96 FL (ref 82–98)
PLATELET # BLD AUTO: 196 THOUSANDS/UL (ref 149–390)
PMV BLD AUTO: 9.8 FL (ref 8.9–12.7)
PROTHROMBIN TIME: 15.4 SECONDS (ref 11.6–14.5)
RBC # BLD AUTO: 2.4 MILLION/UL (ref 3.81–5.12)
WBC # BLD AUTO: 6.8 THOUSAND/UL (ref 4.31–10.16)

## 2022-01-01 PROCEDURE — 82948 REAGENT STRIP/BLOOD GLUCOSE: CPT

## 2022-01-01 PROCEDURE — 99232 SBSQ HOSP IP/OBS MODERATE 35: CPT | Performed by: INTERNAL MEDICINE

## 2022-01-01 PROCEDURE — 85610 PROTHROMBIN TIME: CPT | Performed by: INTERNAL MEDICINE

## 2022-01-01 PROCEDURE — 85027 COMPLETE CBC AUTOMATED: CPT | Performed by: INTERNAL MEDICINE

## 2022-01-01 RX ADMIN — INSULIN LISPRO 3 UNITS: 100 INJECTION, SOLUTION INTRAVENOUS; SUBCUTANEOUS at 16:43

## 2022-01-01 RX ADMIN — GABAPENTIN 100 MG: 100 CAPSULE ORAL at 20:34

## 2022-01-01 RX ADMIN — HEPARIN SODIUM 5000 UNITS: 5000 INJECTION INTRAVENOUS; SUBCUTANEOUS at 20:32

## 2022-01-01 RX ADMIN — SERTRALINE HYDROCHLORIDE 75 MG: 50 TABLET ORAL at 08:23

## 2022-01-01 RX ADMIN — INSULIN LISPRO 3 UNITS: 100 INJECTION, SOLUTION INTRAVENOUS; SUBCUTANEOUS at 12:38

## 2022-01-01 RX ADMIN — PANTOPRAZOLE SODIUM 40 MG: 40 TABLET, DELAYED RELEASE ORAL at 05:28

## 2022-01-01 RX ADMIN — INSULIN GLARGINE 20 UNITS: 100 INJECTION, SOLUTION SUBCUTANEOUS at 20:35

## 2022-01-01 RX ADMIN — GABAPENTIN 100 MG: 100 CAPSULE ORAL at 16:42

## 2022-01-01 RX ADMIN — LEVOTHYROXINE SODIUM 50 MCG: 50 TABLET ORAL at 08:23

## 2022-01-01 RX ADMIN — GABAPENTIN 100 MG: 100 CAPSULE ORAL at 08:23

## 2022-01-01 RX ADMIN — SEVELAMER HYDROCHLORIDE 1600 MG: 800 TABLET, FILM COATED PARENTERAL at 12:38

## 2022-01-01 RX ADMIN — OXYCODONE HYDROCHLORIDE AND ACETAMINOPHEN 2 TABLET: 5; 325 TABLET ORAL at 12:39

## 2022-01-01 RX ADMIN — OXYCODONE HYDROCHLORIDE AND ACETAMINOPHEN 2 TABLET: 5; 325 TABLET ORAL at 20:33

## 2022-01-01 RX ADMIN — SEVELAMER HYDROCHLORIDE 1600 MG: 800 TABLET, FILM COATED PARENTERAL at 08:23

## 2022-01-01 RX ADMIN — DICLOFENAC SODIUM 2 G: 10 GEL TOPICAL at 08:27

## 2022-01-01 RX ADMIN — INSULIN LISPRO 1 UNITS: 100 INJECTION, SOLUTION INTRAVENOUS; SUBCUTANEOUS at 16:43

## 2022-01-01 RX ADMIN — HYDROMORPHONE HYDROCHLORIDE 1 MG: 1 INJECTION, SOLUTION INTRAMUSCULAR; INTRAVENOUS; SUBCUTANEOUS at 16:42

## 2022-01-01 RX ADMIN — Medication 3 MG: at 20:36

## 2022-01-01 RX ADMIN — CARVEDILOL 25 MG: 25 TABLET, FILM COATED ORAL at 16:42

## 2022-01-01 RX ADMIN — SEVELAMER HYDROCHLORIDE 1600 MG: 800 TABLET, FILM COATED PARENTERAL at 16:42

## 2022-01-01 RX ADMIN — WARFARIN SODIUM 12 MG: 5 TABLET ORAL at 20:33

## 2022-01-01 RX ADMIN — HEPARIN SODIUM 5000 UNITS: 5000 INJECTION INTRAVENOUS; SUBCUTANEOUS at 08:23

## 2022-01-01 RX ADMIN — CINACALCET 30 MG: 30 TABLET, FILM COATED ORAL at 08:27

## 2022-01-01 RX ADMIN — INSULIN LISPRO 1 UNITS: 100 INJECTION, SOLUTION INTRAVENOUS; SUBCUTANEOUS at 12:38

## 2022-01-01 RX ADMIN — HYDROMORPHONE HYDROCHLORIDE 1 MG: 1 INJECTION, SOLUTION INTRAMUSCULAR; INTRAVENOUS; SUBCUTANEOUS at 08:23

## 2022-01-01 RX ADMIN — INSULIN LISPRO 3 UNITS: 100 INJECTION, SOLUTION INTRAVENOUS; SUBCUTANEOUS at 08:27

## 2022-01-01 RX ADMIN — OXYCODONE HYDROCHLORIDE AND ACETAMINOPHEN 2 TABLET: 5; 325 TABLET ORAL at 05:27

## 2022-01-01 NOTE — PLAN OF CARE
Problem: Potential for Falls  Goal: Patient will remain free of falls  Description: INTERVENTIONS:  - Educate patient/family on patient safety including physical limitations  - Instruct patient to call for assistance with activity   - Consult OT/PT to assist with strengthening/mobility   - Keep Call bell within reach  - Keep bed low and locked with side rails adjusted as appropriate  - Keep care items and personal belongings within reach  - Initiate and maintain comfort rounds  - Make Fall Risk Sign visible to staff  - Offer Toileting every 2 Hours, in advance of need  - Initiate/Maintain bed alarm  - Obtain necessary fall risk management equipment: bracelet, socks, move near nurses station  - Apply yellow socks and bracelet for high fall risk patients  - Consider moving patient to room near nurses station  Outcome: Progressing

## 2022-01-01 NOTE — ASSESSMENT & PLAN NOTE
Warfarin for anticoagulation  Goal INR 2-3, currently 1 28, will continue 12 mg for now and adjust if needed  Monitor INR

## 2022-01-01 NOTE — PROGRESS NOTES
[address] [facility]  Progress Note Jonathon Comer 1965, 49 y o  female MRN: [id number]  Unit/Bed#: [facility] 933-01 Encounter: [id number]  Primary Care Provider: Mickey Marie MD   Date and time admitted to hospital: 12/20/2021  3:07 PM    Essential hypertension  Assessment & Plan  Monitor blood pressures  Avoid hypotension    Insulin-dependent diabetes mellitus with neuropathy  Assessment & Plan  Lab Results   Component Value Date    HGBA1C 7 9 (H) 09/30/2021     Continue basal/prandial insulin w/ additional SSI coverage per Accu-Cheks  Carbohydrate restricted diet  Continue Gabapentin for neuropathy    Hyperlipidemia  Assessment & Plan  Continue Lipitor    Morbid obesity  Assessment & Plan  BMI of 43 41  Lifestyle/diet modifications    Anemia of chronic disease  Assessment & Plan  Monitor hemoglobin    ESRD on hemodialysis  Assessment & Plan  Routine hemodialysis per nephrology  Continue Renagel/Sensipar supplementation    History of venous thromboembolism  Assessment & Plan  Warfarin for anticoagulation  Goal INR 2-3, currently 1 28, will continue 12 mg for now and adjust if needed  Monitor INR    * Right foot ulceration with osteomyelitis  Assessment & Plan  Status post transmetatarsal amputation with Podiatry  Patient no monitor off antibiotics Infectious Disease input noted  Podiatry input noted patient is noncompliant with nonweightbearing status in this was discussed with the patient and strongly recommended nonweightbearing status for proper healing  Physical therapy  -pending placement    12/31/21:  Reviewed photos from Podiatry earlier, appears well healed without any erythema, no concern for infection currently, continue to monitor off of antibiotics          VTE Pharmacologic Prophylaxis:   High Risk (Score >/= 5) - Pharmacological DVT Prophylaxis Ordered: warfarin (Coumadin)  Sequential Compression Devices Ordered      Patient Centered Rounds: I performed bedside rounds with nursing staff today  Discussions with Specialists or Other Care Team Provider:  N/a    Education and Discussions with Family / Patient: Updated  (significant other) via phone  Time Spent for Care: 30 minutes  More than 50% of total time spent on counseling and coordination of care as described above  Current Length of Stay: 12 day(s)  Current Patient Status: Inpatient   Certification Statement: The patient will continue to require additional inpatient hospital stay due to Pending rehab placement  Discharge Plan: Anticipate discharge in 24-48 hrs to rehab facility  Code Status: Level 1 - Full Code    Subjective:   Patient denies any acute complaints apart from pain in her right foot amputation site, denies fevers chills nausea vomiting states she has not been bearing weight on her right foot    Objective:     Vitals:   Temp (24hrs), Av 5 °F (35 8 °C), Min:96 4 °F (35 8 °C), Max:96 6 °F (35 9 °C)    Temp:  [96 4 °F (35 8 °C)-96 6 °F (35 9 °C)] 96 4 °F (35 8 °C)  HR:  [64-69] 64  Resp:  [16] 16  BP: (108-181)/(60-77) 181/77  SpO2:  [95 %-97 %] 97 %  Body mass index is 43 41 kg/m²  Input and Output Summary (last 24 hours): Intake/Output Summary (Last 24 hours) at 2022 1640  Last data filed at 2022 1336  Gross per 24 hour   Intake 800 ml   Output --   Net 800 ml       Physical Exam:   Physical Exam  Vitals and nursing note reviewed  Constitutional:       General: She is not in acute distress  Appearance: She is well-developed  She is obese  She is not ill-appearing, toxic-appearing or diaphoretic  HENT:      Head: Normocephalic and atraumatic  Eyes:      General: No scleral icterus  Conjunctiva/sclera: Conjunctivae normal    Cardiovascular:      Rate and Rhythm: Normal rate and regular rhythm  Heart sounds: No murmur heard  No friction rub  No gallop  Pulmonary:      Effort: Pulmonary effort is normal  No respiratory distress        Breath sounds: Normal breath sounds  No stridor  No wheezing, rhonchi or rales  Chest:      Chest wall: No tenderness  Abdominal:      General: There is no distension  Palpations: Abdomen is soft  There is no mass  Tenderness: There is no abdominal tenderness  There is no guarding or rebound  Hernia: No hernia is present  Musculoskeletal:         General: No swelling, tenderness, deformity or signs of injury  Cervical back: Neck supple  Left lower leg: Edema present  Comments: Right foot dressing clean dry intact   Skin:     General: Skin is warm and dry  Coloration: Skin is not jaundiced or pale  Findings: No bruising, erythema, lesion or rash  Neurological:      Mental Status: She is alert and oriented to person, place, and time  Additional Data:     Labs:  Results from last 7 days   Lab Units 01/01/22  1409 12/31/21  0545 12/31/21  0545   WBC Thousand/uL 6 80   < > 6 85   HEMOGLOBIN g/dL 7 4*   < > 7 3*   HEMATOCRIT % 23 1*   < > 23 1*   PLATELETS Thousands/uL 196   < > 195   NEUTROS PCT %  --   --  65   LYMPHS PCT %  --   --  20   MONOS PCT %  --   --  9   EOS PCT %  --   --  5    < > = values in this interval not displayed       Results from last 7 days   Lab Units 12/31/21  0545   SODIUM mmol/L 135*   POTASSIUM mmol/L 4 8   CHLORIDE mmol/L 103   CO2 mmol/L 28   BUN mg/dL 50*   CREATININE mg/dL 5 72*   ANION GAP mmol/L 4   CALCIUM mg/dL 9 1   GLUCOSE RANDOM mg/dL 120     Results from last 7 days   Lab Units 01/01/22  0539   INR  1 28*     Results from last 7 days   Lab Units 01/01/22  1219 01/01/22  1107 01/01/22  0723 12/31/21  2131 12/30/21  1653 12/30/21  1235 12/30/21  0744 12/29/21  2144 12/29/21  1701 12/29/21  1137 12/29/21  0829 12/28/21  2104   POC GLUCOSE mg/dl 221* 232* 119 187* 193* 100 209* 209* 166* 171* 98 284*               Lines/Drains:  Invasive Devices  Report    Peripheral Intravenous Line            Peripheral IV 12/31/21 Right Antecubital 1 day Line            Hemodialysis AV Fistula 02/20/18 Left Forearm 1411 days                      Imaging: No pertinent imaging reviewed      Recent Cultures (last 7 days):         Last 24 Hours Medication List:   Current Facility-Administered Medications   Medication Dose Route Frequency Provider Last Rate    acetaminophen  650 mg Oral Q6H PRN Jen Maier DPM      albuterol  2 puff Inhalation Q6H PRN Jen Maier, NAT      ALPRAZolam  0 25 mg Oral 4x Daily PRN Jen Maier, NAT      ammonium lactate   Topical BID PRN Jen Maier DPM      calcium carbonate  500 mg Oral TID PRN Janine Wallis MD      carvedilol  25 mg Oral BID With Meals Jen Maier DPM      cinacalcet  30 mg Oral Daily Elvira Pozo      Diclofenac Sodium  2 g Topical 4x Daily Janine Wallis MD      fentaNYL  25 mcg Intravenous Q5 Min PRN Logan Mckeon CRNA      gabapentin  100 mg Oral TID Jen Maier DPM      heparin (porcine)  5,000 Units Subcutaneous Q12H [address] Elvira Pozo      hydrALAZINE  5 mg Intravenous Q6H PRN Jen Maier DPM      HYDROmorphone  1 mg Intravenous Q3H PRN Janine Wallis MD      insulin glargine  20 Units Subcutaneous HS Jen Maier, NAT      insulin lispro  1-5 Units Subcutaneous TID [facility] Jen Maier DPM      insulin lispro  3 Units Subcutaneous TID With Meals Jen Maier DPM      levothyroxine  50 mcg Oral Daily Jen Maier DPM      lidocaine  1 patch Topical Daily Janine Wallis MD      melatonin  3 mg Oral HS Jen Maier DPM      metoclopramide  10 mg Intravenous Q6H PRN Melita Beckwith PA-C      NIFEdipine  30 mg Oral Daily Jen Maier [place]      nystatin   Topical BID Jen Maier DPM      ondansetron  4 mg Intravenous Once PRN Logan Mckeon CRNA      oxyCODONE-acetaminophen  1 tablet Oral Q6H PRN Jen Maier DPM      oxyCODONE-acetaminophen  2 tablet Oral Q6H PRN Janine Wallis MD      pantoprazole  40 mg Oral Early Morning Jen Maier DPM      polyethylene glycol  17 g Oral Daily PRN Iverson Spatz, DPM      senna  2 tablet Oral HS PRN Iverson Spatz, DPM      sertraline  75 mg Oral Daily Iverson Spatz, [place]      sevelamer  1,600 mg Oral TID With Meals Iverson Spatz, DPM      torsemide  100 mg Oral Daily Iverson Spatz, [place]      warfarin  12 mg Oral Once per day on Mon Tue Wed Thu Fri Sat Junior Mian DO          Today, Patient Was Seen By: Junior Mian DO    **Please Note: This note may have been constructed using a voice recognition system  **

## 2022-01-02 ENCOUNTER — APPOINTMENT (INPATIENT)
Dept: DIALYSIS | Facility: HOSPITAL | Age: 57
DRG: 617 | End: 2022-01-02
Payer: MEDICARE

## 2022-01-02 LAB
BASOPHILS # BLD AUTO: 0.05 THOUSANDS/ΜL (ref 0–0.1)
BASOPHILS NFR BLD AUTO: 1 % (ref 0–1)
EOSINOPHIL # BLD AUTO: 0.6 THOUSAND/ΜL (ref 0–0.61)
EOSINOPHIL NFR BLD AUTO: 8 % (ref 0–6)
ERYTHROCYTE [DISTWIDTH] IN BLOOD BY AUTOMATED COUNT: 15.2 % (ref 11.6–15.1)
FLUAV RNA RESP QL NAA+PROBE: NEGATIVE
FLUBV RNA RESP QL NAA+PROBE: NEGATIVE
GLUCOSE SERPL-MCNC: 139 MG/DL (ref 65–140)
GLUCOSE SERPL-MCNC: 151 MG/DL (ref 65–140)
GLUCOSE SERPL-MCNC: 43 MG/DL (ref 65–140)
GLUCOSE SERPL-MCNC: 89 MG/DL (ref 65–140)
HCT VFR BLD AUTO: 21.1 % (ref 34.8–46.1)
HGB BLD-MCNC: 7 G/DL (ref 11.5–15.4)
IMM GRANULOCYTES # BLD AUTO: 0.09 THOUSAND/UL (ref 0–0.2)
IMM GRANULOCYTES NFR BLD AUTO: 1 % (ref 0–2)
INR PPP: 1.63 (ref 0.84–1.19)
LYMPHOCYTES # BLD AUTO: 1.1 THOUSANDS/ΜL (ref 0.6–4.47)
LYMPHOCYTES NFR BLD AUTO: 14 % (ref 14–44)
MCH RBC QN AUTO: 31 PG (ref 26.8–34.3)
MCHC RBC AUTO-ENTMCNC: 33.2 G/DL (ref 31.4–37.4)
MCV RBC AUTO: 93 FL (ref 82–98)
MONOCYTES # BLD AUTO: 0.63 THOUSAND/ΜL (ref 0.17–1.22)
MONOCYTES NFR BLD AUTO: 8 % (ref 4–12)
NEUTROPHILS # BLD AUTO: 5.21 THOUSANDS/ΜL (ref 1.85–7.62)
NEUTS SEG NFR BLD AUTO: 68 % (ref 43–75)
NRBC BLD AUTO-RTO: 0 /100 WBCS
PLATELET # BLD AUTO: 193 THOUSANDS/UL (ref 149–390)
PMV BLD AUTO: 9.6 FL (ref 8.9–12.7)
PROTHROMBIN TIME: 18.6 SECONDS (ref 11.6–14.5)
RBC # BLD AUTO: 2.26 MILLION/UL (ref 3.81–5.12)
RSV RNA RESP QL NAA+PROBE: NEGATIVE
SARS-COV-2 RNA RESP QL NAA+PROBE: NEGATIVE
WBC # BLD AUTO: 7.68 THOUSAND/UL (ref 4.31–10.16)

## 2022-01-02 PROCEDURE — 90935 HEMODIALYSIS ONE EVALUATION: CPT | Performed by: INTERNAL MEDICINE

## 2022-01-02 PROCEDURE — 0241U HB NFCT DS VIR RESP RNA 4 TRGT: CPT | Performed by: INTERNAL MEDICINE

## 2022-01-02 PROCEDURE — 99232 SBSQ HOSP IP/OBS MODERATE 35: CPT | Performed by: INTERNAL MEDICINE

## 2022-01-02 PROCEDURE — 85025 COMPLETE CBC W/AUTO DIFF WBC: CPT | Performed by: INTERNAL MEDICINE

## 2022-01-02 PROCEDURE — 85610 PROTHROMBIN TIME: CPT | Performed by: INTERNAL MEDICINE

## 2022-01-02 PROCEDURE — 82948 REAGENT STRIP/BLOOD GLUCOSE: CPT

## 2022-01-02 RX ADMIN — SEVELAMER HYDROCHLORIDE 1600 MG: 800 TABLET, FILM COATED PARENTERAL at 18:09

## 2022-01-02 RX ADMIN — WARFARIN SODIUM 9 MG: 7.5 TABLET ORAL at 18:09

## 2022-01-02 RX ADMIN — SERTRALINE HYDROCHLORIDE 75 MG: 50 TABLET ORAL at 13:20

## 2022-01-02 RX ADMIN — PANTOPRAZOLE SODIUM 40 MG: 40 TABLET, DELAYED RELEASE ORAL at 05:09

## 2022-01-02 RX ADMIN — Medication 3 MG: at 21:27

## 2022-01-02 RX ADMIN — GABAPENTIN 100 MG: 100 CAPSULE ORAL at 21:27

## 2022-01-02 RX ADMIN — INSULIN LISPRO 3 UNITS: 100 INJECTION, SOLUTION INTRAVENOUS; SUBCUTANEOUS at 18:10

## 2022-01-02 RX ADMIN — HEPARIN SODIUM 5000 UNITS: 5000 INJECTION INTRAVENOUS; SUBCUTANEOUS at 21:26

## 2022-01-02 RX ADMIN — OXYCODONE HYDROCHLORIDE AND ACETAMINOPHEN 2 TABLET: 5; 325 TABLET ORAL at 05:09

## 2022-01-02 RX ADMIN — DICLOFENAC SODIUM 2 G: 10 GEL TOPICAL at 18:09

## 2022-01-02 RX ADMIN — GABAPENTIN 100 MG: 100 CAPSULE ORAL at 18:09

## 2022-01-02 RX ADMIN — CINACALCET 30 MG: 30 TABLET, FILM COATED ORAL at 13:20

## 2022-01-02 RX ADMIN — INSULIN LISPRO 1 UNITS: 100 INJECTION, SOLUTION INTRAVENOUS; SUBCUTANEOUS at 18:09

## 2022-01-02 RX ADMIN — DICLOFENAC SODIUM 2 G: 10 GEL TOPICAL at 21:27

## 2022-01-02 RX ADMIN — NIFEDIPINE 30 MG: 30 TABLET, FILM COATED, EXTENDED RELEASE ORAL at 13:20

## 2022-01-02 RX ADMIN — HYDROMORPHONE HYDROCHLORIDE 1 MG: 1 INJECTION, SOLUTION INTRAMUSCULAR; INTRAVENOUS; SUBCUTANEOUS at 08:17

## 2022-01-02 RX ADMIN — TORSEMIDE 100 MG: 20 TABLET ORAL at 13:20

## 2022-01-02 RX ADMIN — OXYCODONE HYDROCHLORIDE AND ACETAMINOPHEN 2 TABLET: 5; 325 TABLET ORAL at 13:20

## 2022-01-02 RX ADMIN — CARVEDILOL 25 MG: 25 TABLET, FILM COATED ORAL at 18:09

## 2022-01-02 RX ADMIN — ALPRAZOLAM 0.25 MG: 0.25 TABLET ORAL at 19:25

## 2022-01-02 RX ADMIN — OXYCODONE HYDROCHLORIDE AND ACETAMINOPHEN 2 TABLET: 5; 325 TABLET ORAL at 21:26

## 2022-01-02 NOTE — PLAN OF CARE
Post-Dialysis RN Treatment Note    Blood Pressure:  Pre 115/90  mm/Hg  Post 132/69 mmHg   EDW  121 kg    Weight:  Pre 131 8 kg   Post 128 1 kg   Mode of weight measurement: Standing Scale   Volume Removed  3984 ml    Treatment duration 210 minutes    NS given  No    Treatment shortened?  No   Medications given during Rx Dilaudid 1mg IV    Estimated Kt/V  0 94   Access type: AV fistula   Access Issues: No    Report called to primary nurse  Yes     3 5hr HD treatment, 2K acid bath, UF goal 3 5L  Plan of care reviewed with patient and Dr Hodge at the bedside    Problem: METABOLIC, FLUID AND ELECTROLYTES - ADULT  Goal: Electrolytes maintained within normal limits  Description: INTERVENTIONS:  - Monitor labs and assess patient for signs and symptoms of electrolyte imbalances  - Administer electrolyte replacement as ordered  - Monitor response to electrolyte replacements, including repeat lab results as appropriate  - Instruct patient on fluid and nutrition as appropriate  Outcome: Progressing  Goal: Fluid balance maintained  Description: INTERVENTIONS:  - Monitor labs   - Monitor I/O and WT  - Instruct patient on fluid and nutrition as appropriate  - Assess for signs & symptoms of volume excess or deficit  Outcome: Progressing

## 2022-01-02 NOTE — PROGRESS NOTES
[facility]  Progress Note Vena Malikue 1965, 49 y o  female MRN: [id number]  Unit/Bed#: [facility] 933-01 Encounter: [id number]  Primary Care Provider: Miranda Holguin MD   Date and time admitted to hospital: 12/20/2021  3:07 PM    Essential hypertension  Assessment & Plan  Monitor blood pressures  Avoid hypotension    Insulin-dependent diabetes mellitus with neuropathy  Assessment & Plan  Lab Results   Component Value Date    HGBA1C 7 9 (H) 09/30/2021     Continue basal/prandial insulin w/ additional SSI coverage per Accu-Cheks  Carbohydrate restricted diet  Continue Gabapentin for neuropathy    Hyperlipidemia  Assessment & Plan  Continue Lipitor    Morbid obesity  Assessment & Plan  BMI of 43 41  Lifestyle/diet modifications    Anemia of chronic disease  Assessment & Plan  Monitor hemoglobin    ESRD on hemodialysis  Assessment & Plan  Routine hemodialysis per nephrology  Continue Renagel/Sensipar supplementation    History of venous thromboembolism  Assessment & Plan  Warfarin for anticoagulation  Goal INR 2-3, currently 1 6, will continue 9 mg for now and adjust if needed  Monitor INR    * Right foot ulceration with osteomyelitis  Assessment & Plan  Status post transmetatarsal amputation with Podiatry  Patient no monitor off antibiotics Infectious Disease input noted  Podiatry input noted patient is noncompliant with nonweightbearing status in this was discussed with the patient and strongly recommended nonweightbearing status for proper healing  Physical therapy  -pending placement    1/2/22:  Reviewed photos from Podiatry earlier, appears well healed without any erythema, no concern for infection currently, continue to monitor off of antibiotics          VTE Pharmacologic Prophylaxis:   High Risk (Score >/= 5) - Pharmacological DVT Prophylaxis Ordered: warfarin (Coumadin)  Sequential Compression Devices Ordered      Patient Centered Rounds: I performed bedside rounds with nursing staff today  Discussions with Specialists or Other Care Team Provider: n/a    Education and Discussions with Family / Patient: Updated  (significant other) at bedside  Time Spent for Care: 30 minutes  More than 50% of total time spent on counseling and coordination of care as described above  Current Length of Stay: 13 day(s)  Current Patient Status: Inpatient   Certification Statement: The patient will continue to require additional inpatient hospital stay due to Pending placement  Discharge Plan: Anticipate discharge in 24-48 hrs to rehab facility  Code Status: Level 1 - Full Code    Subjective:   Patient reports constipation is resolved, denies any other acute complaints currently    Objective:     Vitals:   Temp (24hrs), Av 6 °F (36 4 °C), Min:97 1 °F (36 2 °C), Max:98 1 °F (36 7 °C)    Temp:  [97 1 °F (36 2 °C)-98 1 °F (36 7 °C)] 98 1 °F (36 7 °C)  HR:  [60-91] 64  Resp:  [18-19] 19  BP: ()/(43-92) 112/58  SpO2:  [97 %] 97 %  Body mass index is 43 41 kg/m²  Input and Output Summary (last 24 hours): Intake/Output Summary (Last 24 hours) at 2022 1656  Last data filed at 2022 1133  Gross per 24 hour   Intake 860 ml   Output 9384 ml   Net -8524 ml       Physical Exam:   Physical Exam  Vitals and nursing note reviewed  Constitutional:       General: She is not in acute distress  Appearance: She is well-developed  She is obese  She is not ill-appearing, toxic-appearing or diaphoretic  HENT:      Head: Normocephalic and atraumatic  Eyes:      General: No scleral icterus  Conjunctiva/sclera: Conjunctivae normal    Cardiovascular:      Rate and Rhythm: Normal rate and regular rhythm  Heart sounds: No friction rub  No gallop  Pulmonary:      Effort: Pulmonary effort is normal  No respiratory distress  Breath sounds: Normal breath sounds  No stridor  No wheezing, rhonchi or rales  Abdominal:      General: There is no distension  Palpations: Abdomen is soft  There is no mass  Tenderness: There is no abdominal tenderness  There is no guarding or rebound  Hernia: No hernia is present  Musculoskeletal:         General: No swelling, tenderness, deformity or signs of injury  Cervical back: Neck supple  Left lower leg: Edema present  Comments: Right foot dressing clean dry intact   Skin:     General: Skin is warm and dry  Coloration: Skin is not jaundiced or pale  Findings: No bruising, erythema, lesion or rash  Neurological:      Mental Status: She is alert and oriented to person, place, and time  Additional Data:     Labs:  Results from last 7 days   Lab Units 01/02/22  0803   WBC Thousand/uL 7 68   HEMOGLOBIN g/dL 7 0*   HEMATOCRIT % 21 1*   PLATELETS Thousands/uL 193   NEUTROS PCT % 68   LYMPHS PCT % 14   MONOS PCT % 8   EOS PCT % 8*     Results from last 7 days   Lab Units 12/31/21  0545   SODIUM mmol/L 135*   POTASSIUM mmol/L 4 8   CHLORIDE mmol/L 103   CO2 mmol/L 28   BUN mg/dL 50*   CREATININE mg/dL 5 72*   ANION GAP mmol/L 4   CALCIUM mg/dL 9 1   GLUCOSE RANDOM mg/dL 120     Results from last 7 days   Lab Units 01/02/22  0803   INR  1 63*     Results from last 7 days   Lab Units 01/02/22  1627 01/02/22  1216 01/01/22  2053 01/01/22  1643 01/01/22  1219 01/01/22  1107 01/01/22  0723 12/31/21  2131 12/30/21  1653 12/30/21  1235 12/30/21  0744 12/29/21  2144   POC GLUCOSE mg/dl 151* 89 155* 186* 221* 232* 119 187* 193* 100 209* 209*               Lines/Drains:  Invasive Devices  Report    Peripheral Intravenous Line            Peripheral IV 12/31/21 Right Antecubital 2 days          Line            Hemodialysis AV Fistula 02/20/18 Left Forearm 1412 days                      Imaging: No pertinent imaging reviewed      Recent Cultures (last 7 days):         Last 24 Hours Medication List:   Current Facility-Administered Medications   Medication Dose Route Frequency Provider Last Rate    acetaminophen  650 mg Oral Q6H PRN Twanda Christa, DPM      albuterol  2 puff Inhalation Q6H PRN Twanda Christa, DPM      ALPRAZolam  0 25 mg Oral 4x Daily PRN Twanda Muss, DPM      ammonium lactate   Topical BID PRN Twanda Muss, DPM      calcium carbonate  500 mg Oral TID PRN Eufemia Lopez MD      carvedilol  25 mg Oral BID With Meals Twanda Muss, DPM      cinacalcet  30 mg Oral Daily [place], [place]      Diclofenac Sodium  2 g Topical 4x Daily Eufemia Lopez MD      fentaNYL  25 mcg Intravenous Q5 Min PRN Alivia Tony CRNA      gabapentin  100 mg Oral TID Twanda Muss, DPM      heparin (porcine)  5,000 Units Subcutaneous Q12H [address] [place], [place]      hydrALAZINE  5 mg Intravenous Q6H PRN Twanda Muss, DPM      HYDROmorphone  1 mg Intravenous Q3H PRN Eufemia Lopez MD      insulin glargine  20 Units Subcutaneous HS Twanda Muss, DPM      insulin lispro  1-5 Units Subcutaneous TID [facility] Twanda Christa, DPM      insulin lispro  3 Units Subcutaneous TID With Meals Twanda Muss, DPM      levothyroxine  50 mcg Oral Daily Twanda Muss, DPM      lidocaine  1 patch Topical Daily Eufemia Lopez MD      melatonin  3 mg Oral HS Twanda Muss, DPM      metoclopramide  10 mg Intravenous Q6H PRN Melita Beckwith PA-C      NIFEdipine  30 mg Oral Daily [place], [place]      nystatin   Topical BID Twanda [facility], DPM      ondansetron  4 mg Intravenous Once PRN Alivia Tony CRNA      oxyCODONE-acetaminophen  1 tablet Oral Q6H PRN Twanda [facility], DPM      oxyCODONE-acetaminophen  2 tablet Oral Q6H PRN Eufemia Lopez MD      pantoprazole  40 mg Oral Early Morning Twanda Christa, DPM      polyethylene glycol  17 g Oral Daily PRN Twanda Muss, DPM      senna  2 tablet Oral HS PRN Twanda [facility], DPM      sertraline  75 mg Oral Daily [place], [place]      sevelamer  1,600 mg Oral TID With Meals Twanda [facility], DPM      torsemide  100 mg Oral Daily [place], [place]      [START ON 1/3/2022] warfarin  9 mg Oral Once per day on Mon Tue Wed Thu Fri Sat Jeremy Uriostegui DO          Today, Patient Was Seen By: Jeremy Uriostegui DO    **Please Note: This note may have been constructed using a voice recognition system  **

## 2022-01-02 NOTE — ASSESSMENT & PLAN NOTE
Status post transmetatarsal amputation with Podiatry  Patient no monitor off antibiotics Infectious Disease input noted  Podiatry input noted patient is noncompliant with nonweightbearing status in this was discussed with the patient and strongly recommended nonweightbearing status for proper healing  Physical therapy  -pending placement    1/2/22:  Reviewed photos from Podiatry earlier, appears well healed without any erythema, no concern for infection currently, continue to monitor off of antibiotics

## 2022-01-02 NOTE — PROGRESS NOTES
NEPHROLOGY HEMODIALYSIS PROCEDURE NOTE    Seen and examined on hemodialysis  Doing reasonably well  Constipation appears to have resolved  Concern about significant weight gain  Denies any chest pain or shortness of breath  QB: 400  QD: 1 5x  Access: AV access  Dialyzer: 180  Projected Kt/V: -  Sodium: 138  Potassium: 2  Bicarbonate: 35  Ultrafiltration: 2-3  Medications given on HD: -    Physical Exam:    /90 (BP Location: Right arm)   Pulse 80   Temp 97 7 °F (36 5 °C) (Oral)   Resp 18   Ht 5' 6" (1 676 m)   Wt 122 kg (268 lb 15 4 oz)   LMP 02/12/2016 (Exact Date)   SpO2 97%   BMI 43 41 kg/m²     General:  No acute distress appears comfortable  CVS:  Regular  Lungs:  Clear to auscultation  Abdomen:  Distended, soft  Access:  AV access  Extremities:  Right greater than left lower extremity edema    ASSESSMENT/PLAN:  1  End-stage renal disease, maintenance hemodialysis Tuesday Thursday Saturday, [address]  2  Anemia chronic kidney disease, currently not on MITCHELL therapy given history of DVT  3  Hypertension, blood pressure overall appears stable  4  Right foot osteomyelitis status post TMA  5  CKD associated mineral bone disorder, continue with Renagel as well Sensipar   6   Volume overload, attempt for additional ultrafiltration tomorrow    Plan:  Continue with maintenance hemodialysis, Tuesday Thursday Saturday, so today schedule given holiday  Continue monitor hemoglobin closely currently not on MITCHELL therapy given history of DVT  Continue to challenge weight with hemodialysis

## 2022-01-02 NOTE — ASSESSMENT & PLAN NOTE
Warfarin for anticoagulation  Goal INR 2-3, currently 1 6, will continue 9 mg for now and adjust if needed  Monitor INR

## 2022-01-03 LAB
GLUCOSE SERPL-MCNC: 147 MG/DL (ref 65–140)
GLUCOSE SERPL-MCNC: 164 MG/DL (ref 65–140)
GLUCOSE SERPL-MCNC: 165 MG/DL (ref 65–140)
GLUCOSE SERPL-MCNC: 178 MG/DL (ref 65–140)

## 2022-01-03 PROCEDURE — 82948 REAGENT STRIP/BLOOD GLUCOSE: CPT

## 2022-01-03 PROCEDURE — 97530 THERAPEUTIC ACTIVITIES: CPT

## 2022-01-03 PROCEDURE — 99024 POSTOP FOLLOW-UP VISIT: CPT | Performed by: PODIATRIST

## 2022-01-03 PROCEDURE — 97116 GAIT TRAINING THERAPY: CPT

## 2022-01-03 PROCEDURE — 99232 SBSQ HOSP IP/OBS MODERATE 35: CPT | Performed by: INTERNAL MEDICINE

## 2022-01-03 RX ORDER — OXYCODONE HYDROCHLORIDE 5 MG/1
7.5 TABLET ORAL EVERY 4 HOURS PRN
Status: DISCONTINUED | OUTPATIENT
Start: 2022-01-03 | End: 2022-01-07

## 2022-01-03 RX ADMIN — SEVELAMER HYDROCHLORIDE 1600 MG: 800 TABLET, FILM COATED PARENTERAL at 17:05

## 2022-01-03 RX ADMIN — NIFEDIPINE 30 MG: 30 TABLET, FILM COATED, EXTENDED RELEASE ORAL at 10:18

## 2022-01-03 RX ADMIN — ALPRAZOLAM 0.25 MG: 0.25 TABLET ORAL at 22:06

## 2022-01-03 RX ADMIN — OXYCODONE HYDROCHLORIDE AND ACETAMINOPHEN 2 TABLET: 5; 325 TABLET ORAL at 12:13

## 2022-01-03 RX ADMIN — WARFARIN SODIUM 9 MG: 7.5 TABLET ORAL at 17:04

## 2022-01-03 RX ADMIN — SEVELAMER HYDROCHLORIDE 1600 MG: 800 TABLET, FILM COATED PARENTERAL at 12:10

## 2022-01-03 RX ADMIN — INSULIN LISPRO 3 UNITS: 100 INJECTION, SOLUTION INTRAVENOUS; SUBCUTANEOUS at 12:10

## 2022-01-03 RX ADMIN — SERTRALINE HYDROCHLORIDE 75 MG: 50 TABLET ORAL at 10:16

## 2022-01-03 RX ADMIN — HYDROMORPHONE HYDROCHLORIDE 1 MG: 1 INJECTION, SOLUTION INTRAMUSCULAR; INTRAVENOUS; SUBCUTANEOUS at 10:20

## 2022-01-03 RX ADMIN — CARVEDILOL 25 MG: 25 TABLET, FILM COATED ORAL at 07:47

## 2022-01-03 RX ADMIN — DICLOFENAC SODIUM 2 G: 10 GEL TOPICAL at 12:11

## 2022-01-03 RX ADMIN — LIDOCAINE 5% 1 PATCH: 700 PATCH TOPICAL at 10:21

## 2022-01-03 RX ADMIN — Medication 3 MG: at 22:06

## 2022-01-03 RX ADMIN — LEVOTHYROXINE SODIUM 50 MCG: 50 TABLET ORAL at 10:16

## 2022-01-03 RX ADMIN — INSULIN GLARGINE 20 UNITS: 100 INJECTION, SOLUTION SUBCUTANEOUS at 22:05

## 2022-01-03 RX ADMIN — CINACALCET 30 MG: 30 TABLET, FILM COATED ORAL at 10:18

## 2022-01-03 RX ADMIN — GABAPENTIN 100 MG: 100 CAPSULE ORAL at 22:06

## 2022-01-03 RX ADMIN — OXYCODONE HYDROCHLORIDE AND ACETAMINOPHEN 2 TABLET: 5; 325 TABLET ORAL at 05:17

## 2022-01-03 RX ADMIN — TORSEMIDE 100 MG: 20 TABLET ORAL at 10:15

## 2022-01-03 RX ADMIN — INSULIN LISPRO 3 UNITS: 100 INJECTION, SOLUTION INTRAVENOUS; SUBCUTANEOUS at 17:09

## 2022-01-03 RX ADMIN — HEPARIN SODIUM 5000 UNITS: 5000 INJECTION INTRAVENOUS; SUBCUTANEOUS at 22:06

## 2022-01-03 RX ADMIN — INSULIN LISPRO 3 UNITS: 100 INJECTION, SOLUTION INTRAVENOUS; SUBCUTANEOUS at 07:42

## 2022-01-03 RX ADMIN — CARVEDILOL 25 MG: 25 TABLET, FILM COATED ORAL at 17:06

## 2022-01-03 RX ADMIN — HYDROMORPHONE HYDROCHLORIDE 1 MG: 1 INJECTION, SOLUTION INTRAMUSCULAR; INTRAVENOUS; SUBCUTANEOUS at 22:06

## 2022-01-03 RX ADMIN — DICLOFENAC SODIUM 2 G: 10 GEL TOPICAL at 10:18

## 2022-01-03 RX ADMIN — INSULIN LISPRO 1 UNITS: 100 INJECTION, SOLUTION INTRAVENOUS; SUBCUTANEOUS at 07:42

## 2022-01-03 RX ADMIN — HEPARIN SODIUM 5000 UNITS: 5000 INJECTION INTRAVENOUS; SUBCUTANEOUS at 10:16

## 2022-01-03 RX ADMIN — GABAPENTIN 100 MG: 100 CAPSULE ORAL at 10:16

## 2022-01-03 RX ADMIN — SEVELAMER HYDROCHLORIDE 1600 MG: 800 TABLET, FILM COATED PARENTERAL at 07:47

## 2022-01-03 RX ADMIN — PANTOPRAZOLE SODIUM 40 MG: 40 TABLET, DELAYED RELEASE ORAL at 05:06

## 2022-01-03 RX ADMIN — GABAPENTIN 100 MG: 100 CAPSULE ORAL at 17:05

## 2022-01-03 RX ADMIN — HYDROMORPHONE HYDROCHLORIDE 1 MG: 1 INJECTION, SOLUTION INTRAMUSCULAR; INTRAVENOUS; SUBCUTANEOUS at 14:00

## 2022-01-03 RX ADMIN — HYDROMORPHONE HYDROCHLORIDE 1 MG: 1 INJECTION, SOLUTION INTRAMUSCULAR; INTRAVENOUS; SUBCUTANEOUS at 06:28

## 2022-01-03 RX ADMIN — HYDROMORPHONE HYDROCHLORIDE 1 MG: 1 INJECTION, SOLUTION INTRAMUSCULAR; INTRAVENOUS; SUBCUTANEOUS at 17:07

## 2022-01-03 NOTE — NURSING NOTE
Multiple failed attempts to obtain blood work  PICC team called  Will come to bedside to attempt blood draw

## 2022-01-03 NOTE — PHYSICAL THERAPY NOTE
Physical Therapy Treatment Note    Patient's Name: Mellissa Stein  : 22 1215   PT Last Visit   PT Visit Date 22   Note Type   Note Type Treatment   Pain Assessment   Pain Assessment Tool 0-10   Pain Score 9   Pain Location/Orientation Orientation: Right;Location: [facility]   Hospital Pain Intervention(s) Emotional support;Relaxation technique  (educated on NWB status )   Restrictions/Precautions   Weight Bearing Precautions Per Order Yes   RLE Weight Bearing Per Order NWB  (non-compliant)   Other Precautions WBS; Fall Risk;Pain   General   Chart Reviewed Yes   Response to Previous Treatment Patient with no complaints from previous session  Family/Caregiver Present Yes   Cognition   Orientation Level Oriented X4   Comments cooperative w/ PT session, but poor safety awareness and decreased insight into deficits, teary + emotional w/ functional mobility   Subjective   Subjective "I've been up since 3 in the morning and I think I would do better if I was more rested  Can you come back tomorrow too?"   Bed Mobility   Additional Comments Pt greeted ambulating in room FWB on RLE, bending over to clean up spilled grapefruit juice  Pt educated on importance of NWB RLE and to call for assistance in these situations  Transfers   Sit to Stand 3  Moderate assistance   Additional items Assist x 1; Increased time required;Verbal cues  (ModAx1 to maintain NWB RLE)   Stand to Sit 3  Moderate assistance   Additional items Assist x 2; Increased time required;Verbal cues  (ModAx1 to kick out RLE and maintain NWB)   Stand pivot 3  Moderate assistance   Additional items Assist x 1; Increased time required;Verbal cues   Additional Comments RW + kneeler   Ambulation/Elevation   Gait pattern Excessively slow  (increased difficulty w/ turns)   Gait Assistance 3  Moderate assist  (MinAx1 -> ModAx1)   Additional items Assist x 1;Verbal cues; Tactile cues   Assistive Device Rolling walker  (+ kneeler)   Distance 14' Ambulation/Elevation Additional Comments Demonstration provided re: use of RW + kneeler  Pt started out at [address], but quickly fatigued, ModAx1 w/ cues for standing rest and deep breathing prior to starting again  Balance   Static Sitting Good   Dynamic Sitting Fair +   Static Standing Fair -  (RW + kneeler, NWB RLE)   Dynamic Standing Poor +  (RW + kneeler, NWB RLE)   Ambulatory Poor +  (RW + kneeler, NWB RLE)   Endurance Deficit   Endurance Deficit Yes   Endurance Deficit Description weakness, fatigue   Activity Tolerance   Activity Tolerance Patient limited by fatigue   Medical Staff Made Aware nsg, primary P9 PT Anne   Nurse Made Aware yes - cleared pt for PT   Assessment   Prognosis Fair   Problem List Decreased strength;Decreased endurance; Impaired balance;Decreased mobility; Decreased safety awareness; Impaired judgement;Obesity;Orthopedic restrictions;Pain   Assessment Pt seen for PT session w/ emphasis on transfer training, gait training, and re-education on NWB RLE status  Pt greeted ambulating in room, bent over to clean up fruit juice  Re-directed pt to bed and emphasized importance of NWB RLE in order for healing s/p TMA; also educated pt on possibility of further amputation if TMA does not heal properly  Pt verbalized understanding, but ? Carryover when pt is alone  Demonstrated use of RW + kneeler to pt so she can increase mobility while maintaining NWB RLE  Pt able to ambulate 7' w/ RW + kneeler w/ MinAx1 but quickly fatigued (due to low endurance and anxiety), ModAx1 to return to chair  Pt teary upon return to chair  Pt's boyfriend present throughout portion of session, appears supportive of pt and re-iterated to pt importance of listening to therapist and other medical professionals  Pt made aware that kneelers are not covered by insurance  Pt agreeable to trial knee scooter next session, and requesting to be seen by PT tomorrow    From a PT standpoint, still continue to recommend rehab upon d/c due to low endurance, decreased strength, + poor compliance w/ NWB RLE w/ transfers  Barriers to Discharge Inaccessible home environment   Goals   Patient Goals to continue PT to get better   PT Treatment Day 3   Plan   Treatment/Interventions Functional transfer training;LE strengthening/ROM; Elevations; Therapeutic exercise; Endurance training;Patient/family training;Bed mobility; Equipment eval/education;Gait training; Compensatory technique education;Spoke to nursing   Progress Progressing toward goals   PT Frequency 3-5x/wk   Recommendation   PT Discharge Recommendation Post acute rehabilitation services   Equipment Recommended   (TBD at rehab)   Libby Khanna 435   Turning in Bed Without Bedrails 3   Lying on Back to Sitting on Edge of Flat Bed 3   Moving Bed to Chair 2   Standing Up From Chair 2   Walk in Room 2   Climb 3-5 Stairs 1   Basic Mobility Inpatient Raw Score 13   Basic Mobility Standardized Score 33 99   Highest Level Of Mobility   -[facility] Goal 4: Move to chair/commode   Education   Education Provided Mobility training;Assistive device  (kneeler, NWB RLE)   Patient Reinforcement needed   End of Consult   Patient Position at End of Consult Bedside chair; All needs within reach  (boyfriend at bedside)     Marjorie Epley, PT, DPT

## 2022-01-03 NOTE — CASE MANAGEMENT
Case Management Discharge Planning Note    Patient name Bonnie Rivera  Location [facility] 933/[facility] 933-01 MRN [id number]  : 1965 Date 1/3/2022       Current Admission Date: 2021  Current Admission Diagnosis:Right foot ulceration with osteomyelitis   Patient Active Problem List    Diagnosis Date Noted    Right foot ulceration with osteomyelitis 2021    Pruritus 2021    Postop check 2021    Sigmoid diverticulitis 2021    Pneumonia 2021    Chronic anticoagulation 2021    Essential hypertension 2021    Arteriovenous fistula for hemodialysis in place, primary [facility]) 2021    Healthcare-associated pneumonia 2021    Right foot pain 2021    A-V fistula ([facility] ) 2021    Chronic pain syndrome 2021    Bilateral primary osteoarthritis of knee 2021    Bilateral patellofemoral syndrome 2021    Tinea unguium 2020    S/P foot surgery, right 2020    History of claustrophobia 2020    Morbid obesity 2020    Hyperlipidemia 2020    Diabetic polyneuropathy associated with type 2 diabetes mellitus ([facility] ) 2020    [facility] diabetic foot exam [facility] 2020    Corns and callosities 2020    History of amputation of lesser toe of right foot ([facility] ) 2020    Flu-like symptoms 2020    Lumbar radiculopathy 2020    Low back pain with sciatica 2020    Hemodialysis-associated hypotension 2019    Hyponatremia 2019    Parenchymal renal hypertension 2019    Candidiasis of breast 2019    Anemia of chronic disease 2019    Disruption of internal surgical wound 2019    Dyspnea 2019    Secondary hyperparathyroidism of renal origin ([facility] ) 2019    Nausea and vomiting 2019    Meningioma ([facility] ) 2019    Concussion without loss of consciousness 03/15/2019    Colon cancer screening 2019    Gastroesophageal reflux disease 03/05/2019    Primary osteoarthritis of right knee 10/25/2018    [facility]od[facility] status [facility] ) 10/23/2018    Knee pain 10/22/2018    Ambulatory dysfunction 10/22/2018    Anxiety disorder 04/06/2017    Acquired hypothyroidism 07/22/2016    Glaucoma 07/01/2016    ESRD on hemodialysis 07/01/2016    Abnormal uterine bleeding 02/15/2016    History of venous thromboembolism 02/14/2016    Pulmonary embolus ([facility] ) 10/30/2015    Cervical dysplasia 05/03/2015    Chronic endometritis 10/17/2014    Tinea pedis 10/02/2014    Benign neoplasm of skin 09/25/2014    Insulin-dependent diabetes mellitus with neuropathy 09/04/2014    Phlebitis and thrombophlebitis of superficial vessels of lower extremities 04/10/2014    Limb pain 11/25/2013    Mononeuritis of upper limb, unspecified laterality 11/25/2013    Legal blindness, as defined in [place] [facility] 21/35/7212    Complication from renal dialysis device 04/22/2013    Essential hypertension 10/15/2012    Cardiomyopathy ([facility] ) 09/26/2012    Esophageal reflux 08/20/2012    Allergic rhinitis 08/20/2012      LOS (days): 14  Geometric Mean LOS (GMLOS) (days): 5 80  Days to GMLOS:-8 1     OBJECTIVE:  Risk of Unplanned Readmission Score: 59         Current admission status: Inpatient   Preferred Pharmacy:   [id number] [phone] [facility]  [address] [place] [address]  Phone: [phone] Fax: [phone] [facility], [facility] [address] [address]  Phone: [phone] Fax: [phone]    Primary Care Provider: Michelle Michael MD    Primary Insurance: MEDICARE  Secondary Insurance: [facility]    DISCHARGE DETAILS:  Additional Comments: CM spoke to [facility] who stated that they did not get a chance to review the referral but would eb working on it soon   CM spoke to pt and notified her of the status of the referrals that were sent and informed pt that she had to make a decision on a STR  Per pt she is open to a referral to -  CM sent referral as requested

## 2022-01-03 NOTE — PROGRESS NOTES
[address] [facility]  Progress Note Hubert Milligan 1965, 49 y o  female MRN: [id number]  Unit/Bed#: [facility] 933-01 Encounter: [id number]  Primary Care Provider: Amanda Hoskins MD   Date and time admitted to hospital: 12/20/2021  3:07 PM    Essential hypertension  Assessment & Plan  Monitor blood pressures  Avoid hypotension    Insulin-dependent diabetes mellitus with neuropathy  Assessment & Plan  Lab Results   Component Value Date    HGBA1C 7 9 (H) 09/30/2021     Continue basal/prandial insulin w/ additional SSI coverage per Accu-Cheks  Carbohydrate restricted diet  Continue Gabapentin for neuropathy    Hyperlipidemia  Assessment & Plan  Continue Lipitor    Morbid obesity  Assessment & Plan  BMI of 43 41  Lifestyle/diet modifications    Anemia of chronic disease  Assessment & Plan  Monitor hemoglobin, has been stable around 7-8 for the past several days  ESRD on hemodialysis  Assessment & Plan  Routine hemodialysis per nephrology  Continue Renagel/Sensipar supplementation    History of venous thromboembolism  Assessment & Plan  Warfarin for anticoagulation  Goal INR 2-3, yesterday 1 6, will continue 9 mg for now and adjust if needed  Monitor INR    * Right foot ulceration with osteomyelitis  Assessment & Plan  Status post transmetatarsal amputation with Podiatry  Patient no monitor off antibiotics Infectious Disease input noted  Podiatry input noted patient is noncompliant with nonweightbearing status in this was discussed with the patient and strongly recommended nonweightbearing status for proper healing  Physical therapy  -pending placement    1/3/22:  Reviewed photos from Podiatry earlier, appears well healed without any erythema, no concern for infection currently, continue to monitor off of antibiotics          VTE Pharmacologic Prophylaxis:   Moderate Risk (Score 3-4) - Pharmacological DVT Prophylaxis Ordered: warfarin (Coumadin)      Patient Centered Rounds: I performed bedside rounds with nursing staff today  Discussions with Specialists or Other Care Team Provider: n/a    Education and Discussions with Family / Patient: Patient declined call to   Time Spent for Care: 30 minutes  More than 50% of total time spent on counseling and coordination of care as described above  Current Length of Stay: 14 day(s)  Current Patient Status: Inpatient   Certification Statement: The patient will continue to require additional inpatient hospital stay due to Pending discharge placement  Discharge Plan: Anticipate discharge in 24-48 hrs to rehab facility  Code Status: Level 1 - Full Code    Subjective:   Patient denies any acute complaints today  Reports she has been trying to avoid bearing any weight on her right leg    Objective:     Vitals:   Temp (24hrs), Av 8 °F (36 6 °C), Min:97 5 °F (36 4 °C), Max:98 °F (36 7 °C)    Temp:  [97 5 °F (36 4 °C)-98 °F (36 7 °C)] 97 5 °F (36 4 °C)  HR:  [64] 64  Resp:  [16-20] 16  BP: (106-146)/(46-75) 119/46  Body mass index is 43 41 kg/m²  Input and Output Summary (last 24 hours): Intake/Output Summary (Last 24 hours) at 1/3/2022 1647  Last data filed at 1/3/2022 1301  Gross per 24 hour   Intake 480 ml   Output 0 ml   Net 480 ml       Physical Exam:   Physical Exam  Vitals and nursing note reviewed  Constitutional:       General: She is not in acute distress  Appearance: She is well-developed  She is obese  She is not ill-appearing, toxic-appearing or diaphoretic  HENT:      Head: Normocephalic and atraumatic  Eyes:      General: No scleral icterus  Conjunctiva/sclera: Conjunctivae normal    Cardiovascular:      Rate and Rhythm: Normal rate and regular rhythm  Heart sounds: No murmur heard  No friction rub  No gallop  Pulmonary:      Effort: Pulmonary effort is normal  No respiratory distress  Breath sounds: Normal breath sounds  No stridor  No wheezing, rhonchi or rales     Abdominal: General: There is no distension  Palpations: Abdomen is soft  There is no mass  Tenderness: There is no abdominal tenderness  There is no guarding or rebound  Hernia: No hernia is present  Musculoskeletal:         General: No swelling, tenderness, deformity or signs of injury  Cervical back: Neck supple  Left lower leg: Edema present  Comments: Right foot dressing clean dry intact   Skin:     General: Skin is warm and dry  Coloration: Skin is not jaundiced or pale  Findings: No bruising, erythema, lesion or rash  Neurological:      Mental Status: She is alert and oriented to person, place, and time  Additional Data:     Labs:  Results from last 7 days   Lab Units 01/02/22  0803   WBC Thousand/uL 7 68   HEMOGLOBIN g/dL 7 0*   HEMATOCRIT % 21 1*   PLATELETS Thousands/uL 193   NEUTROS PCT % 68   LYMPHS PCT % 14   MONOS PCT % 8   EOS PCT % 8*     Results from last 7 days   Lab Units 12/31/21  0545   SODIUM mmol/L 135*   POTASSIUM mmol/L 4 8   CHLORIDE mmol/L 103   CO2 mmol/L 28   BUN mg/dL 50*   CREATININE mg/dL 5 72*   ANION GAP mmol/L 4   CALCIUM mg/dL 9 1   GLUCOSE RANDOM mg/dL 120     Results from last 7 days   Lab Units 01/02/22  0803   INR  1 63*     Results from last 7 days   Lab Units 01/03/22  1119 01/03/22  0725 01/03/22  0424 01/02/22  2211 01/02/22  2107 01/02/22  1627 01/02/22  1216 01/01/22  2053 01/01/22  1643 01/01/22  1219 01/01/22  1107 01/01/22  0723   POC GLUCOSE mg/dl 147* 178* 164* 139 43* 151* 89 155* 186* 221* 232* 119               Lines/Drains:  Invasive Devices  Report    Peripheral Intravenous Line            Peripheral IV 01/03/22 Right Antecubital <1 day          Line            Hemodialysis AV Fistula 02/20/18 Left Forearm 1413 days                      Imaging: No pertinent imaging reviewed      Recent Cultures (last 7 days):         Last 24 Hours Medication List:   Current Facility-Administered Medications   Medication Dose Route Frequency Provider Last Rate    acetaminophen  650 mg Oral Q6H PRN Rogelio Fleming, DPJULITA      albuterol  2 puff Inhalation Q6H PRN Rogelio Fleming, DPJULITA      ALPRAZolam  0 25 mg Oral 4x Daily PRN Rogelio Fleming, DPJULITA      ammonium lactate   Topical BID PRN Rogelio Fleming, DPM      calcium carbonate  500 mg Oral TID PRN Lexi Ramírez MD      carvedilol  25 mg Oral BID With Meals Rogelio Fleming, NAT      cinacalcet  30 mg Oral Daily Rogelio Fleming [facility]      Diclofenac Sodium  2 g Topical 4x Daily Lexi Ramírez MD      fentaNYL  25 mcg Intravenous Q5 Min PRN Adamaris Shipman CRNA      gabapentin  100 mg Oral TID Rogelio Fleming, NAT      heparin (porcine)  5,000 Units Subcutaneous Q12H [facility] Rogelio Fleming [facility]      hydrALAZINE  5 mg Intravenous Q6H PRN Rogelio Fleming, NAT      HYDROmorphone  1 mg Intravenous Q3H PRN Lexi Ramírez MD      insulin glargine  20 Units Subcutaneous HS Rogelio Fleming, DPJULITA      insulin lispro  1-5 Units Subcutaneous TID [facility] Rogelio Fleming, DPJULITA      insulin lispro  3 Units Subcutaneous TID With Meals Rogelio Fleming, DPJULITA      levothyroxine  50 mcg Oral Daily Rogelio Fleming, DPJULITA      lidocaine  1 patch Topical Daily Lexi Ramírez MD      melatonin  3 mg Oral HS Rogelio Fleming, NAT      metoclopramide  10 mg Intravenous Q6H PRN Melita Beckwith PA-C      [facility]  30 mg [facility] Daily [facility]      nystatin   Topical BID Rogelio Fleming DPM      ondansetron  4 mg Intravenous Once PRN Adamaris Shipman CRNA      oxyCODONE-acetaminophen  1 tablet Oral Q6H PRN Rogelio Fleming, DPJULITA      oxyCODONE-acetaminophen  2 tablet Oral Q6H PRN Lexi Ramírez MD      pantoprazole  40 mg Oral Early Morning Rogelio Fleming, NAT      polyethylene glycol  17 g Oral Daily PRN Rogelio Fleming, DPJULITA      senna  2 tablet Oral HS PRN Rogelio Fleming, DPJULITA      sertraline  75 mg Oral Daily Rogelio Fleming [facility]      sevelamer  1,600 mg Oral TID With Meals Rogelio Fleming, NAT      torsemide  100 mg Oral Daily Rogelio Fleming [facility]  warfarin  9 mg Oral Once per day on Mon Tue Wed Thu Fri Sat Zohaib Conley DO          Today, Patient Was Seen By: Zohaib Conley DO    **Please Note: This note may have been constructed using a voice recognition system  **

## 2022-01-03 NOTE — PROGRESS NOTES
[facility] PROGRESS NOTE   Barb Tse 49 y o  female MRN: [id number]  Unit/Bed#: [facility] 933-01 Encounter: [id number]  Reason for Consult: ESRD    ASSESSMENT and PLAN:    53-year-old female with a past medical history of ESRD, hypertension, diabetes, GERD, obesity, DVT who initially presents on 12/21 with lower extremity foot wound  Nephrology is consulted for ESRD  There is concern for osteomyelitis    1) ESRD    -outpatient unit is [address]  -access left upper extremity AV fistula  -patient had dialysis last on Sunday due to holiday schedule    Plan  -plan for dialysis tomorrow due to logistics  Initially was planned to have extra ultrafiltration today, but the patient is on room air, does have some edema but does not urgently required dialysis today  I reviewed this with the patient and patient was agreeable   -avoid long-term Voltaren gel use if possible    2) volume    -ultrafiltrate with dialysis as tolerates    3) osteomyelitis    -per primary team  -status post right TMA    4) anemia    -no MITCHELL due to history of PE    5) hypertension    -monitor with current regimen    6) MBD-on phosphorus binders    -on Sensipar also    SUBJECTIVE / 24H INTERVAL HISTORY:    Blood pressure is 1/0 9-449 systolic  Afebrile  Patient denies shortness of breath  On room air      OBJECTIVE:  Current Weight: Weight - Scale: 122 kg (268 lb 15 4 oz)  Vitals:    01/02/22 1133 01/02/22 1626 01/02/22 2232 01/03/22 0742   BP: 132/69 112/58 106/58 146/75   BP Location: Right arm      Pulse: 64  64    Resp: 19  20 16   Temp: 97 5 °F (36 4 °C) 98 1 °F (36 7 °C) 98 °F (36 7 °C) 97 8 °F (36 6 °C)   TempSrc: Oral      SpO2:       Weight:       Height:         No intake or output data in the 24 hours ending 01/03/22 1206  General: NAD  Skin: no rash  Eyes: anicteric sclera  ENT: moist mucous membrane  Neck: supple  Chest: CTA b/l, no ronchii, no wheeze, no rubs, no rales  CVS: s1s2, no murmur, no gallop, no rub  Abdomen: soft, nontender, nl sounds  Extremities:  1+ edema LE b/l  : no hernandez  Neuro: AAOX3  Psych: normal affect    Medications:    Current Facility-Administered Medications:     acetaminophen (TYLENOL) tablet 650 mg, 650 mg, Oral, Q6H PRN, Daleen Emily, DPM, 650 mg at 12/28/21 1721    albuterol (PROVENTIL HFA,VENTOLIN HFA) inhaler 2 puff, 2 puff, Inhalation, Q6H PRN, Daleen Emily, DPM    ALPRAZolam Shellierahul Valentinoter) tablet 0 25 mg, 0 25 mg, Oral, 4x Daily PRN, Daleen Emily, DPM, 0 25 mg at 01/02/22 1925    ammonium lactate (LAC-HYDRIN) 12 % cream, , Topical, BID PRN, Daleen Emily, DPM, Given at 12/21/21 2207    calcium carbonate (TUMS) chewable tablet 500 mg, 500 mg, Oral, TID PRN, Rajiv Blount MD, 500 mg at 12/30/21 2343    carvedilol (COREG) tablet 25 mg, 25 mg, Oral, BID With Meals, Daleen Emily, DPM, 25 mg at 01/03/22 0747    cinacalcet (SENSIPAR) tablet 30 mg, 30 mg, Oral, Daily, Daleen Emily, DPM, 30 mg at 01/03/22 1018    Diclofenac Sodium (VOLTAREN) 1 % topical gel 2 g, 2 g, Topical, 4x Daily, Rajiv Blount MD, 2 g at 01/03/22 1018    fentaNYL (SUBLIMAZE) injection 25 mcg, 25 mcg, Intravenous, Q5 Min PRN, Eleanor Najjar, CRNA    gabapentin (NEURONTIN) capsule 100 mg, 100 mg, Oral, TID, Daleen Emily, DPM, 100 mg at 01/03/22 1016    heparin (porcine) subcutaneous injection 5,000 Units, 5,000 Units, Subcutaneous, Q12H [address], Daleen Emily, DPM, 5,000 Units at 01/03/22 1016    hydrALAZINE (APRESOLINE) injection 5 mg, 5 mg, Intravenous, Q6H PRN, Daleen Emily, DPM, 5 mg at 12/21/21 0047    HYDROmorphone (DILAUDID) injection 1 mg, 1 mg, Intravenous, Q3H PRN, Rajivelroy Blount MD, 1 mg at 01/03/22 1020    insulin glargine (LANTUS) subcutaneous injection 20 Units 0 2 mL, 20 Units, Subcutaneous, HS, Marivel Diaz DPM, 20 Units at 01/01/22 2035    insulin lispro (HumaLOG) 100 units/mL subcutaneous injection 1-5 Units, 1-5 Units, Subcutaneous, TID AC, 1 Units at 01/03/22 0742 **AND** Fingerstick Glucose (POCT), , , TID AC, Ressie Kezia, DPM    insulin lispro (HumaLOG) 100 units/mL subcutaneous injection 3 Units, 3 Units, Subcutaneous, TID With Meals, Ressie Kezia, DPM, 3 Units at 01/03/22 0742    levothyroxine tablet 50 mcg, 50 mcg, Oral, Daily, Ressie Kezia, DPM, 50 mcg at 01/03/22 1016    lidocaine (LIDODERM) 5 % patch 1 patch, 1 patch, Topical, Daily, Eugenia Jain MD, 1 patch at 01/03/22 1021    melatonin tablet 3 mg, 3 mg, Oral, HS, Ressie Kezia, DPM, 3 mg at 01/02/22 2127    metoclopramide (REGLAN) injection 10 mg, 10 mg, Intravenous, Q6H PRN, Melita Beckwith PA-C    NIFEdipine (PROCARDIA XL) 24 hr tablet 30 mg, 30 mg, Oral, Daily, Ressie Kezia, DPM, 30 mg at 01/03/22 1018    nystatin (MYCOSTATIN) powder, , Topical, BID, Ressie Kezia, DPM, Given at 12/30/21 1805    ondansetron [facility]) injection 4 mg, 4 mg, Intravenous, Once PRN, Skye De Jesus, RON    oxyCODONE-acetaminophen (PERCOCET) 5-325 mg per tablet 1 tablet, 1 tablet, Oral, Q6H PRN, Ressie Kezia, DPM, 1 tablet at 12/29/21 0649    oxyCODONE-acetaminophen (PERCOCET) 5-325 mg per tablet 2 tablet, 2 tablet, Oral, Q6H PRN, Eugenia Jain MD, 2 tablet at 01/03/22 0517    pantoprazole (PROTONIX) EC tablet 40 mg, 40 mg, Oral, Early Morning, Ressie Kezia, DPM, 40 mg at 01/03/22 0506    polyethylene glycol (MIRALAX) packet 17 g, 17 g, Oral, Daily PRN, Ressie Kezia, DPM    senna (SENOKOT) tablet 17 2 mg, 2 tablet, Oral, HS PRN, Ressie Kezia, DPM    sertraline (ZOLOFT) tablet 75 mg, 75 mg, Oral, Daily, Ressie Kezia, DPM, 75 mg at 01/03/22 1016    sevelamer (RENAGEL) tablet 1,600 mg, 1,600 mg, Oral, TID With Meals, Ressie Kezia, DPM, 1,600 mg at 01/03/22 0747    torsemide (DEMADEX) tablet 100 mg, 100 mg, Oral, Daily, Ressie Kezia, DPM, 100 mg at 01/03/22 1015    warfarin (COUMADIN) tablet 9 mg, 9 mg, Oral, Once per day on Mon Tue Wed Thu Fri Sat, Rene Paulson DO, 9 mg at 01/02/22 1138    Laboratory Results:  Results from last 7 days   Lab Units 01/02/22  0803 01/01/22  1409 12/31/21  0545 12/30/21  0843 12/29/21  0721 12/28/21  0931   WBC Thousand/uL 7 68 6 80 6 85  --  7 16 9 02   HEMOGLOBIN g/dL 7 0* 7 4* 7 3* 7 0* 7 0* 6 4*   HEMATOCRIT % 21 1* 23 1* 23 1* 21 0* 20 8* 20 2*   PLATELETS Thousands/uL 193 196 195  --  173 210   POTASSIUM mmol/L  --   --  4 8  --  4 4 4 7   CHLORIDE mmol/L  --   --  103  --  100 101   CO2 mmol/L  --   --  28  --  29 23   BUN mg/dL  --   --  50*  --  31* 48*   CREATININE mg/dL  --   --  5 72*  --  5 92* 8 17*   CALCIUM mg/dL  --   --  9 1  --  8 9 8 8

## 2022-01-03 NOTE — PLAN OF CARE
Problem: PHYSICAL THERAPY ADULT  Goal: Performs mobility at highest level of function for planned discharge setting  See evaluation for individualized goals  Description: Treatment/Interventions: ADL retraining,Functional transfer training,LE strengthening/ROM,Patient/family training,Equipment eval/education,Bed mobility,Gait training,Spoke to nursing,Spoke to case management,OT  Equipment Recommended: Claribel Sauer       See flowsheet documentation for full assessment, interventions and recommendations  Outcome: Progressing  Note: Prognosis: Fair  Problem List: Decreased strength,Decreased endurance,Impaired balance,Decreased mobility,Decreased safety awareness,Impaired judgement,Obesity,Orthopedic restrictions,Pain  Assessment: Pt seen for PT session w/ emphasis on transfer training, gait training, and re-education on NWB RLE status  Pt greeted ambulating in room, bent over to clean up fruit juice  Re-directed pt to bed and emphasized importance of NWB RLE in order for healing s/p TMA; also educated pt on possibility of further amputation if TMA does not heal properly  Pt verbalized understanding, but ? Carryover when pt is alone  Demonstrated use of RW + kneeler to pt so she can increase mobility while maintaining NWB RLE  Pt able to ambulate 7' w/ RW + kneeler w/ MinAx1 but quickly fatigued (due to low endurance and anxiety), ModAx1 to return to chair  Pt teary upon return to chair  Pt's boyfriend present throughout portion of session, appears supportive of pt and re-iterated to pt importance of listening to therapist and other medical professionals  Pt made aware that kneelers are not covered by insurance  Pt agreeable to trial knee scooter next session, and requesting to be seen by PT tomorrow  From a PT standpoint, still continue to recommend rehab upon d/c due to low endurance, decreased strength, + poor compliance w/ NWB RLE w/ transfers    Barriers to Discharge: Inaccessible home environment        PT Discharge Recommendation: Post acute rehabilitation services          See flowsheet documentation for full assessment

## 2022-01-03 NOTE — PROGRESS NOTES
PICC team unsuccessful at blood draw  Dr Neville Barragan aware, Per Dr Neville Barragan, pt is stable, will draw at dialysis tomorrow  Will pass on in report and patient is aware as well

## 2022-01-03 NOTE — PROGRESS NOTES
[facility] Podiatry - Progress Note  Patient: Nikky Mills 49 y o  female   MRN: [id number]  PCP: Erendira Gross MD  Unit/Bed#: [facility] 933-01 Encounter: [id number]  Date Of Visit: 22  Hospital Stay Days: 14    ASSESSMENT:    Nikky Mills is a 49 y o  female with:      1  Right medial 1st MTPJ diabetic ulceration- Olguin 3 - POA              -s/p R TMA (DOS: 21)  2  T2DM  3  ESRD on HD  4  Obesity      PLAN:    · Patient remains stable for discharge to rehab podiatric standpoint  · Dressing change today  TMA site stable with all sutures intact local signs of infection  · Patient does state that she forgets nonweightbearing status at times steps on  · Continue local wound care  Appreciate nursing assistance dressing changes  · Elevation wedges and pillows when  Patient was educated extensively the importance of compliance to both nonweightbearing status and elevation of  · Rest of care primary team    Weight bearing status:  Strict nonweightbearing to right foot      SUBJECTIVE:     The patient was seen, evaluated, and assessed at bedside today  The patient was awake, alert, and in no acute distress  No acute events overnight  The patient reports that she at times forgets especially early in the when going to the restroom  About nonweightbearing status and has walked on the multiple times few days  Patient states that she is comfortable here and would like to remain in the hospital and go to [facility] on the night if possible    Patient denies N/V/F/chills/SOB/CP  OBJECTIVE:     Vitals:   /75   Pulse 64   Temp 97 8 °F (36 6 °C)   Resp 16   Ht 5' 6" (1 676 m)   Wt 122 kg (268 lb 15 4 oz)   LMP 2016 (Exact Date)   SpO2 97%   BMI 43 41 kg/m²     Temp (24hrs), Av 9 °F (36 6 °C), Min:97 5 °F (36 4 °C), Max:98 1 °F (36 7 °C)      Physical Exam :     General:  Alert, cooperative, and in no distress    Lower extremity exam:  Musculoskeletal, neurovascular status at baseline  Dermatologic:    Sutures remain intact  No maceration to incision line  Skin is well approximated expression of hematoma purulence with examination of foot  Capillary refill is less than 3 seconds dorsal and plantar to the incision line  Clinical Images 01/03/22:   right foot     right foot        Additional Data:     Labs:    Results from last 7 days   Lab Units 01/02/22  0803   WBC Thousand/uL 7 68   HEMOGLOBIN g/dL 7 0*   HEMATOCRIT % 21 1*   PLATELETS Thousands/uL 193   NEUTROS PCT % 68   LYMPHS PCT % 14   MONOS PCT % 8   EOS PCT % 8*     Results from last 7 days   Lab Units 12/31/21  0545   POTASSIUM mmol/L 4 8   CHLORIDE mmol/L 103   CO2 mmol/L 28   BUN mg/dL 50*   CREATININE mg/dL 5 72*   CALCIUM mg/dL 9 1     Results from last 7 days   Lab Units 01/02/22  0803   INR  1 63*       * I Have Reviewed All Lab Data Listed Above  Recent Cultures (last 7 days):               Imaging: I have personally reviewed pertinent films in PACS  XR foot 3+ views RIGHT    Result Date: 12/21/2021  Impression: Bone destruction involving the medial aspect of the distal 1st metatarsal compatible with acute osteomyelitis  Postoperative changes  Workstation performed: [id number]     MRI foot/forefoot toes right wo contrast    Result Date: 12/22/2021  Impression: Findings consistent with osteomyelitis of the 1st metatarsal and proximal phalanx The study was marked in EPIC for immediate notification  Workstation performed: [id number]     EKG, Pathology, and Other Studies: I have personally reviewed pertinent reports      Active medications:  Current Facility-Administered Medications   Medication Dose Route Frequency    acetaminophen (TYLENOL) tablet 650 mg  650 mg Oral Q6H PRN    albuterol (PROVENTIL HFA,VENTOLIN HFA) inhaler 2 puff  2 puff Inhalation Q6H PRN    ALPRAZolam (XANAX) tablet 0 25 mg  0 25 mg Oral 4x Daily PRN    ammonium lactate (LAC-HYDRIN) 12 % cream   Topical BID PRN    calcium carbonate (TUMS) chewable tablet 500 mg  500 mg Oral TID PRN    carvedilol (COREG) tablet 25 mg  25 mg Oral BID With Meals    cinacalcet (SENSIPAR) tablet 30 mg  30 mg Oral Daily    Diclofenac Sodium (VOLTAREN) 1 % topical gel 2 g  2 g Topical 4x Daily    fentaNYL (SUBLIMAZE) injection 25 mcg  25 mcg Intravenous Q5 Min PRN    gabapentin (NEURONTIN) capsule 100 mg  100 mg Oral TID    heparin (porcine) subcutaneous injection 5,000 Units  5,000 Units Subcutaneous Q12H [address]    hydrALAZINE (APRESOLINE) injection 5 mg  5 mg Intravenous Q6H PRN    HYDROmorphone (DILAUDID) injection 1 mg  1 mg Intravenous Q3H PRN    insulin glargine (LANTUS) subcutaneous injection 20 Units 0 2 mL  20 Units Subcutaneous HS    insulin lispro (HumaLOG) 100 units/mL subcutaneous injection 1-5 Units  1-5 Units Subcutaneous TID AC    insulin lispro (HumaLOG) 100 units/mL subcutaneous injection 3 Units  3 Units Subcutaneous TID With Meals    levothyroxine tablet 50 mcg  50 mcg Oral Daily    lidocaine (LIDODERM) 5 % patch 1 patch  1 patch Topical Daily    melatonin tablet 3 mg  3 mg Oral HS    metoclopramide (REGLAN) injection 10 mg  10 mg Intravenous Q6H PRN    NIFEdipine (PROCARDIA XL) 24 hr tablet 30 mg  30 mg Oral Daily    nystatin (MYCOSTATIN) powder   Topical BID    ondansetron (ZOFRAN) injection 4 mg  4 mg Intravenous Once PRN    oxyCODONE-acetaminophen (PERCOCET) 5-325 mg per tablet 1 tablet  1 tablet Oral Q6H PRN    oxyCODONE-acetaminophen (PERCOCET) 5-325 mg per tablet 2 tablet  2 tablet Oral Q6H PRN    pantoprazole (PROTONIX) EC tablet 40 mg  40 mg Oral Early Morning    polyethylene glycol (MIRALAX) packet 17 g  17 g Oral Daily PRN    senna (SENOKOT) tablet 17 2 mg  2 tablet Oral HS PRN    sertraline (ZOLOFT) tablet 75 mg  75 mg Oral Daily    sevelamer (RENAGEL) tablet 1,600 mg  1,600 mg Oral TID With Meals    torsemide (DEMADEX) tablet 100 mg  100 mg Oral Daily    warfarin (COUMADIN) tablet 9 mg  9 mg Oral Once per day on Mon Tue Wed Thu Fri Sat         ** Please Note: Portions of the record may have been created with voice recognition software  Occasional wrong word or "sound a like" substitutions may have occurred due to the inherent limitations of voice recognition software  Read the chart carefully and recognize, using context, where substitutions have occurred   **

## 2022-01-03 NOTE — ASSESSMENT & PLAN NOTE
Warfarin for anticoagulation  Goal INR 2-3, yesterday 1 6, will continue 9 mg for now and adjust if needed  Monitor INR

## 2022-01-03 NOTE — ASSESSMENT & PLAN NOTE
Status post transmetatarsal amputation with Podiatry  Patient no monitor off antibiotics Infectious Disease input noted  Podiatry input noted patient is noncompliant with nonweightbearing status in this was discussed with the patient and strongly recommended nonweightbearing status for proper healing  Physical therapy  -pending placement    1/3/22:  Reviewed photos from Podiatry earlier, appears well healed without any erythema, no concern for infection currently, continue to monitor off of antibiotics

## 2022-01-04 ENCOUNTER — APPOINTMENT (INPATIENT)
Dept: DIALYSIS | Facility: HOSPITAL | Age: 57
DRG: 617 | End: 2022-01-04
Payer: MEDICARE

## 2022-01-04 LAB
ANION GAP SERPL CALCULATED.3IONS-SCNC: 7 MMOL/L (ref 4–13)
BASOPHILS # BLD AUTO: 0.04 THOUSANDS/ΜL (ref 0–0.1)
BASOPHILS NFR BLD AUTO: 1 % (ref 0–1)
BUN SERPL-MCNC: 77 MG/DL (ref 5–25)
CALCIUM SERPL-MCNC: 8.6 MG/DL (ref 8.3–10.1)
CHLORIDE SERPL-SCNC: 98 MMOL/L (ref 100–108)
CO2 SERPL-SCNC: 25 MMOL/L (ref 21–32)
CREAT SERPL-MCNC: 7.07 MG/DL (ref 0.6–1.3)
EOSINOPHIL # BLD AUTO: 0.48 THOUSAND/ΜL (ref 0–0.61)
EOSINOPHIL NFR BLD AUTO: 6 % (ref 0–6)
ERYTHROCYTE [DISTWIDTH] IN BLOOD BY AUTOMATED COUNT: 15.7 % (ref 11.6–15.1)
GFR SERPL CREATININE-BSD FRML MDRD: 6 ML/MIN/1.73SQ M
GLUCOSE SERPL-MCNC: 126 MG/DL (ref 65–140)
GLUCOSE SERPL-MCNC: 158 MG/DL (ref 65–140)
GLUCOSE SERPL-MCNC: 181 MG/DL (ref 65–140)
HCT VFR BLD AUTO: 21.5 % (ref 34.8–46.1)
HGB BLD-MCNC: 7.1 G/DL (ref 11.5–15.4)
IMM GRANULOCYTES # BLD AUTO: 0.1 THOUSAND/UL (ref 0–0.2)
IMM GRANULOCYTES NFR BLD AUTO: 1 % (ref 0–2)
INR PPP: 2.11 (ref 0.84–1.19)
LYMPHOCYTES # BLD AUTO: 1.16 THOUSANDS/ΜL (ref 0.6–4.47)
LYMPHOCYTES NFR BLD AUTO: 15 % (ref 14–44)
MCH RBC QN AUTO: 31.3 PG (ref 26.8–34.3)
MCHC RBC AUTO-ENTMCNC: 33 G/DL (ref 31.4–37.4)
MCV RBC AUTO: 95 FL (ref 82–98)
MONOCYTES # BLD AUTO: 0.65 THOUSAND/ΜL (ref 0.17–1.22)
MONOCYTES NFR BLD AUTO: 9 % (ref 4–12)
NEUTROPHILS # BLD AUTO: 5.1 THOUSANDS/ΜL (ref 1.85–7.62)
NEUTS SEG NFR BLD AUTO: 68 % (ref 43–75)
NRBC BLD AUTO-RTO: 0 /100 WBCS
PLATELET # BLD AUTO: 204 THOUSANDS/UL (ref 149–390)
PMV BLD AUTO: 9.7 FL (ref 8.9–12.7)
POTASSIUM SERPL-SCNC: 5.5 MMOL/L (ref 3.5–5.3)
PROTHROMBIN TIME: 22.7 SECONDS (ref 11.6–14.5)
RBC # BLD AUTO: 2.27 MILLION/UL (ref 3.81–5.12)
SODIUM SERPL-SCNC: 130 MMOL/L (ref 136–145)
WBC # BLD AUTO: 7.53 THOUSAND/UL (ref 4.31–10.16)

## 2022-01-04 PROCEDURE — 97110 THERAPEUTIC EXERCISES: CPT

## 2022-01-04 PROCEDURE — 82948 REAGENT STRIP/BLOOD GLUCOSE: CPT

## 2022-01-04 PROCEDURE — 90935 HEMODIALYSIS ONE EVALUATION: CPT | Performed by: INTERNAL MEDICINE

## 2022-01-04 PROCEDURE — 97530 THERAPEUTIC ACTIVITIES: CPT

## 2022-01-04 PROCEDURE — 99232 SBSQ HOSP IP/OBS MODERATE 35: CPT | Performed by: INTERNAL MEDICINE

## 2022-01-04 PROCEDURE — 85610 PROTHROMBIN TIME: CPT | Performed by: INTERNAL MEDICINE

## 2022-01-04 PROCEDURE — 80048 BASIC METABOLIC PNL TOTAL CA: CPT | Performed by: INTERNAL MEDICINE

## 2022-01-04 PROCEDURE — 85025 COMPLETE CBC W/AUTO DIFF WBC: CPT | Performed by: INTERNAL MEDICINE

## 2022-01-04 RX ORDER — HYDROMORPHONE HCL IN WATER/PF 6 MG/30 ML
0.2 PATIENT CONTROLLED ANALGESIA SYRINGE INTRAVENOUS EVERY 4 HOURS PRN
Status: DISCONTINUED | OUTPATIENT
Start: 2022-01-04 | End: 2022-01-05

## 2022-01-04 RX ADMIN — Medication 3 MG: at 21:26

## 2022-01-04 RX ADMIN — HYDROMORPHONE HYDROCHLORIDE 1 MG: 1 INJECTION, SOLUTION INTRAMUSCULAR; INTRAVENOUS; SUBCUTANEOUS at 15:08

## 2022-01-04 RX ADMIN — ALPRAZOLAM 0.25 MG: 0.25 TABLET ORAL at 07:48

## 2022-01-04 RX ADMIN — SEVELAMER HYDROCHLORIDE 1600 MG: 800 TABLET, FILM COATED PARENTERAL at 15:05

## 2022-01-04 RX ADMIN — SEVELAMER HYDROCHLORIDE 1600 MG: 800 TABLET, FILM COATED PARENTERAL at 18:45

## 2022-01-04 RX ADMIN — OXYCODONE HYDROCHLORIDE 7.5 MG: 5 TABLET ORAL at 18:50

## 2022-01-04 RX ADMIN — INSULIN GLARGINE 20 UNITS: 100 INJECTION, SOLUTION SUBCUTANEOUS at 21:27

## 2022-01-04 RX ADMIN — CINACALCET 30 MG: 30 TABLET, FILM COATED ORAL at 15:07

## 2022-01-04 RX ADMIN — PANTOPRAZOLE SODIUM 40 MG: 40 TABLET, DELAYED RELEASE ORAL at 04:41

## 2022-01-04 RX ADMIN — GABAPENTIN 100 MG: 100 CAPSULE ORAL at 15:07

## 2022-01-04 RX ADMIN — WARFARIN SODIUM 9 MG: 7.5 TABLET ORAL at 18:45

## 2022-01-04 RX ADMIN — CARVEDILOL 25 MG: 25 TABLET, FILM COATED ORAL at 18:45

## 2022-01-04 RX ADMIN — TORSEMIDE 100 MG: 20 TABLET ORAL at 15:04

## 2022-01-04 RX ADMIN — SERTRALINE HYDROCHLORIDE 75 MG: 50 TABLET ORAL at 15:07

## 2022-01-04 RX ADMIN — GABAPENTIN 100 MG: 100 CAPSULE ORAL at 21:27

## 2022-01-04 RX ADMIN — NIFEDIPINE 30 MG: 30 TABLET, FILM COATED, EXTENDED RELEASE ORAL at 15:05

## 2022-01-04 RX ADMIN — OXYCODONE HYDROCHLORIDE 7.5 MG: 5 TABLET ORAL at 12:56

## 2022-01-04 RX ADMIN — LEVOTHYROXINE SODIUM 50 MCG: 50 TABLET ORAL at 15:05

## 2022-01-04 RX ADMIN — HYDROMORPHONE HYDROCHLORIDE 1 MG: 1 INJECTION, SOLUTION INTRAMUSCULAR; INTRAVENOUS; SUBCUTANEOUS at 07:48

## 2022-01-04 RX ADMIN — OXYCODONE HYDROCHLORIDE 7.5 MG: 5 TABLET ORAL at 04:41

## 2022-01-04 RX ADMIN — ALPRAZOLAM 0.25 MG: 0.25 TABLET ORAL at 21:26

## 2022-01-04 RX ADMIN — INSULIN LISPRO 1 UNITS: 100 INJECTION, SOLUTION INTRAVENOUS; SUBCUTANEOUS at 18:43

## 2022-01-04 RX ADMIN — NYSTATIN: 100000 POWDER TOPICAL at 18:44

## 2022-01-04 RX ADMIN — HEPARIN SODIUM 5000 UNITS: 5000 INJECTION INTRAVENOUS; SUBCUTANEOUS at 21:27

## 2022-01-04 RX ADMIN — INSULIN LISPRO 3 UNITS: 100 INJECTION, SOLUTION INTRAVENOUS; SUBCUTANEOUS at 18:44

## 2022-01-04 NOTE — PLAN OF CARE
Post-Dialysis RN Treatment Note    Blood Pressure:  Pre 142/110 mm/Hg  Post 120/84 mmHg   EDW  121 kg    Weight:  Pre 131 7 kg   Post 128 9 kg   Mode of weight measurement: Other   Volume Removed  2800 ml    Treatment duration 210 minutes    NS given  No    Treatment shortened? No   Medications given during Rx Xanax   25 and Dilaudid 1mg given for patient at 0730   Estimated Kt/V  0 96   Access type: AV fistula   Access Issues: No    Report called to primary nurse   Yes Ramone Zamora      3 5 hour hemodialysis session planned for today with ultrafiltration goal of 3 5 liters to optimize fluid and electrolyte balance     Problem: METABOLIC, FLUID AND ELECTROLYTES - ADULT  Goal: Electrolytes maintained within normal limits  Description: INTERVENTIONS:  - Monitor labs and assess patient for signs and symptoms of electrolyte imbalances  - Administer electrolyte replacement as ordered  - Monitor response to electrolyte replacements, including repeat lab results as appropriate  - Instruct patient on fluid and nutrition as appropriate  Outcome: Progressing  Goal: Fluid balance maintained  Description: INTERVENTIONS:  - Monitor labs   - Monitor I/O and WT  - Instruct patient on fluid and nutrition as appropriate  - Assess for signs & symptoms of volume excess or deficit  Outcome: Progressing

## 2022-01-04 NOTE — PHYSICAL THERAPY NOTE
PHYSICAL THERAPY NOTE          Patient Name: Hilda Norwood  [id number] Date: 1/4/2022 01/04/22 1509   PT Last Visit   PT Visit Date 01/04/22   Note Type   Note Type Treatment   Pain Assessment   Pain Assessment Tool 0-10   Pain Score 7   Pain Location/Orientation Orientation: Right;Location: Knee   Patient's Stated Pain Goal No pain   Hospital Pain Intervention(s) Repositioned; Ambulation/increased activity   Restrictions/Precautions   Weight Bearing Precautions Per Order Yes   RLE Weight Bearing Per Order NWB   Other Precautions Chair Alarm; Bed Alarm;WBS;Fall Risk;Pain;Contact/isolation   General   Chart Reviewed Yes   Response to Previous Treatment Patient with no complaints from previous session  Family/Caregiver Present No   Cognition   Overall Cognitive Status WFL   Arousal/Participation Responsive; Cooperative   Attention Attends with cues to redirect   Orientation Level Oriented X4   Memory Decreased recall of precautions   Following Commands Follows one step commands without difficulty   Comments Pt pleasant and cooperative, decreased safety awareness + limited insight on current deficits  Can be emotionally labile at times    Bed Mobility   Supine to Sit 5  Supervision   Additional items HOB elevated; Bedrails; Increased time required   Sit to Supine Unable to assess   Transfers   Sit to Stand 3  Moderate assistance   Additional items Assist x 1; Armrests; Increased time required;Verbal cues   Stand to Sit 3  Moderate assistance   Additional items Assist x 1; Armrests; Increased time required;Verbal cues   Stand pivot 3  Moderate assistance   Additional items Assist x 1; Increased time required;Verbal cues   Additional Comments transfers with RW; performs SPT with mod Ax1 + cues to maintain NWB on RLE    Ambulation/Elevation   Gait pattern Foward flexed; Excessively slow; Short stride   Gait Assistance 3  Moderate assist   Additional items Assist x 1;Verbal cues   Assistive Device Rolling walker   Distance 2 mini-hops to assist in SPT to bedside chair  (declining further ambulation 2* increased fatigue )   Balance   Static Sitting Fair +   Dynamic Sitting Fair +   Static Standing Poor +   Dynamic Standing Poor   Ambulatory Poor   Endurance Deficit   Endurance Deficit Yes   Endurance Deficit Description weakness, fatigue   Activity Tolerance   Activity Tolerance Patient limited by fatigue   Medical Staff Made Aware restorative    Nurse Made Aware RN cleared pt to participate in therapy session    Exercises   Knee AROM Long Arc Quad Sitting;Bilateral;AROM;15 reps  (2 sets )   Ankle Pumps Sitting;Bilateral;15 reps;AROM  (x2 sets )   Marching Sitting;Bilateral;15 reps;AROM  (x2 sets)   Assessment   Prognosis Fair   Problem List Decreased strength;Decreased endurance; Impaired balance;Decreased mobility; Decreased safety awareness; Impaired judgement;Obesity;Orthopedic restrictions;Pain   Assessment Pt seen for PT treatment session this date  Therapy session focused on bed mobility, functional transfers, gait training and therapeutic exercise in order to improve overall mobility and independence  Pt requires assist of 1 for all mobility performed this date  Pt performed bed mobility tasks at S level  Pt performed STS from EOB to RW with mod A x1; cues to maintain NWB on RLE required  Pt performed SPT with mini-hops/ pivot to complete transfer; pt requiring mod Ax1 to maintain NWB on RLE  Pt impulsive at times; cues needed for safety  Pt performed/ tolerated seated b/l LE therex to hips, knees and ankles with no c/o pain/ discomfort  Pt making progress toward goals  Pt was left sitting upright in bedside chair with chair alarm on at the end of PT session with all needs in reach  Pt would benefit from continued PT services while in hospital to address remaining limitations  PT to continue to follow pt and recommends post-acute rehab services   The patient's AM-PAC Basic Mobility Inpatient Short Form Raw Score is 13  A Raw score of less than or equal to 16 suggests the patient may benefit from discharge to post-acute rehabilitation services  Please also refer to the recommendation of the Physical Therapist for safe discharge planning  Barriers to Discharge Inaccessible home environment   Goals   Patient Goals to go home    STG Expiration Date 01/10/21   PT Treatment Day 4   Plan   Treatment/Interventions Functional transfer training;LE strengthening/ROM; Therapeutic exercise; Endurance training;Patient/family training;Equipment eval/education; Bed mobility;Gait training;Spoke to nursing;Spoke to case management   Progress Progressing toward goals   PT Frequency 3-5x/wk   Recommendation   PT Discharge Recommendation Post acute rehabilitation services   AM-PAC Basic Mobility Inpatient   Turning in Bed Without Bedrails 3   Lying on Back to Sitting on Edge of Flat Bed 3   Moving Bed to Chair 2   Standing Up From Chair 2   Walk in Room 2   Climb 3-5 Stairs 1   Basic Mobility Inpatient Raw Score 13   Basic Mobility Standardized Score 33 99   Highest Level Of Mobility   JH-HL Goal 4: Move to chair/commode   JH-HLM Highest Level of Mobility 5: Stand (1 or more minutes)   JH-HLM Goal Achieved Yes   Education   Education Provided Mobility training;Assistive device   Patient Reinforcement needed   End of Consult   Patient Position at End of Consult Bedside chair;Bed/Chair alarm activated; All needs within reach     Jordan Salvador, PT, DPT

## 2022-01-04 NOTE — PROGRESS NOTES
[facility] PROGRESS NOTE   Barb Tse 49 y o  female MRN: [id number]  [facility] En[facility]: [id number]  Reason for Consult: ESRD    ASSESSMENT and PLAN:    53-year-old female with a past medical history of ESRD, hypertension, diabetes, GERD, obesity, DVT who initially presents on 12/21 with lower extremity foot wound  Nephrology is consulted for ESRD  There is concern for osteomyelitis     1) ESRD     -outpatient unit is [address]  -access left upper extremity AV fistula  -patient had dialysis last on Sunday due to holiday schedule     Plan  -hemodialysis-patient seen and examined on dialysis today  Ultrafiltrate 3 5 L  3 5 hour treatments due to logistics  Two potassium bath  Sodium 138  Bicarbonate 35  F 180 filter  Four hundred blood flow   -avoid long-term Voltaren gel use if possible     2) volume     -ultrafiltrate with dialysis as tolerates     3) osteomyelitis     -per primary team  -status post right TMA  -patient being encouraged by Podiatry team to avoid weight-bearing as the patient is not compliant with this as often as should be 3     4) anemia     -no MITCHELL due to history of PE  -not a candidate for IV iron as last ferritin was above 1300  -will recheck iron levels as outpatient     5) hypertension     -monitor with current regimen     6) MBD-on phosphorus binders     -on Sensipar also    SUBJECTIVE / 24H INTERVAL HISTORY:    Blood pressure is 977-448 systolic  Patient seen on dialysis  Denies complaints    No shortness of breath    OBJECTIVE:  Current Weight: Weight - Scale: 122 kg (268 lb 15 4 oz)  Vitals:    01/03/22 2210 01/04/22 0730 01/04/22 0800 01/04/22 0830   BP: 118/83 (!) 142/110 (!) 152/119 (!) 103/35   BP Location:  Right arm Right arm Right arm   Pulse:  69 71 89   Resp:  18 17 17   Temp: 97 6 °F (36 4 °C) 97 7 °F (36 5 °C)     TempSrc:  Oral     SpO2:       Weight:       Height:           Intake/Output Summary (Last 24 hours) at 1/4/2022 0900  Last data filed at 1/4/2022 0730  Gross per 24 hour   Intake 680 ml   Output 0 ml   Net 680 ml     General: NAD  Skin: no rash  Eyes: anicteric sclera  Neck: supple  Chest: CTA b/l, no ronchii, no wheeze, no rubs, no rales  CVS: s1s2, no murmur, no gallop, no rub  Abdomen: soft, nontender, nl sounds  Extremities:  1 to 2+ edema LE b/l, lower extremity foot wrapped  : no hernandez  Neuro: AAOX3  Psych: normal affect    Medications:    Current Facility-Administered Medications:     acetaminophen (TYLENOL) tablet 650 mg, 650 mg, Oral, Q6H PRN, Anderson Dionne, DPM, 650 mg at 12/28/21 1721    albuterol (PROVENTIL HFA,VENTOLIN HFA) inhaler 2 puff, 2 puff, Inhalation, Q6H PRN, Anderson Dionne, DPM    ALPRAZolam Harlon Needle) tablet 0 25 mg, 0 25 mg, Oral, 4x Daily PRN, Anderson Dionne, DPM, 0 25 mg at 01/04/22 0748    ammonium lactate (LAC-HYDRIN) 12 % cream, , Topical, BID PRN, Anderson Dionne, DPM, Given at 12/21/21 2207    calcium carbonate (TUMS) chewable tablet 500 mg, 500 mg, Oral, TID PRN, Nabila Means MD, 500 mg at 12/30/21 2343    carvedilol (COREG) tablet 25 mg, 25 mg, Oral, BID With Meals, Anderson Dionne, DPM, 25 mg at 01/03/22 1706    cinacalcet (SENSIPAR) tablet 30 mg, 30 mg, Oral, Daily, Anderson Dionne, DPM, 30 mg at 01/03/22 1018    Diclofenac Sodium (VOLTAREN) 1 % topical gel 2 g, 2 g, Topical, 4x Daily, Nabila Means MD, 2 g at 01/03/22 1211    fentaNYL (SUBLIMAZE) injection 25 mcg, 25 mcg, Intravenous, Q5 Min PRN, Nico Agosto CRNA    gabapentin (NEURONTIN) capsule 100 mg, 100 mg, Oral, TID, Anderson Dionne, DPM, 100 mg at 01/03/22 2206    heparin (porcine) subcutaneous injection 5,000 Units, 5,000 Units, Subcutaneous, Q12H [address], Anderson Dionne, DPM, 5,000 Units at 01/03/22 2206    hydrALAZINE (APRESOLINE) injection 5 mg, 5 mg, Intravenous, Q6H PRN, Justin Truong DPM, 5 mg at 12/21/21 0047    HYDROmorphone (DILAUDID) injection 1 mg, 1 mg, Intravenous, Q3H PRN, Nabila Means MD, 1 mg at 01/04/22 0748    insulin glargine (LANTUS) subcutaneous injection 20 Units 0 2 mL, 20 Units, Subcutaneous, HS, Rogelio Ka, DPM, 20 Units at 01/03/22 2205    insulin lispro (HumaLOG) 100 units/mL subcutaneous injection 1-5 Units, 1-5 Units, Subcutaneous, TID AC, 1 Units at 01/03/22 0742 **AND** Fingerstick Glucose (POCT), , , TID AC, Rogelio Ka, DPM    insulin lispro (HumaLOG) 100 units/mL subcutaneous injection 3 Units, 3 Units, Subcutaneous, TID With Meals, Rogelio Ka, DPM, 3 Units at 01/03/22 1709    levothyroxine tablet 50 mcg, 50 mcg, Oral, Daily, Rogelio Ka, DPM, 50 mcg at 01/03/22 1016    lidocaine (LIDODERM) 5 % patch 1 patch, 1 patch, Topical, Daily, Lexi Ramírez MD, 1 patch at 01/03/22 1021    melatonin tablet 3 mg, 3 mg, Oral, HS, Rogelio Ka, DPM, 3 mg at 01/03/22 2206    metoclopramide (REGLAN) injection 10 mg, 10 mg, Intravenous, Q6H PRN, Melita Beckwith PA-C    NIFEdipine (PROCARDIA XL) 24 hr tablet 30 mg, 30 mg, Oral, Daily, Rogelio Ka, DPM, 30 mg at 01/03/22 1018    nystatin (MYCOSTATIN) powder, , Topical, BID, Rogelio Ka, DPM, Given at 12/30/21 1805    ondansetron [facility]) injection 4 mg, 4 mg, Intravenous, Once PRN, Adamaris Shipman CRNA    oxyCODONE (ROXICODONE) IR tablet 7 5 mg, 7 5 mg, Oral, Q4H PRN, 7 5 mg at 01/04/22 0441 **OR** oxyCODONE (ROXICODONE) IR tablet 12 5 mg, 12 5 mg, Oral, Q4H PRN, Ferdinand Cardona DO    pantoprazole (PROTONIX) EC tablet 40 mg, 40 mg, Oral, Early Morning, Rogelio Ka, DPM, 40 mg at 01/04/22 0441    polyethylene glycol (MIRALAX) packet 17 g, 17 g, Oral, Daily PRN, Rogelio Ka, DPM    senna (SENOKOT) tablet 17 2 mg, 2 tablet, Oral, HS PRN, Rogelio Ka, DPM    sertraline (ZOLOFT) tablet 75 mg, 75 mg, Oral, Daily, Rogelio Ka, DPM, 75 mg at 01/03/22 1016    sevelamer (RENAGEL) tablet 1,600 mg, 1,600 mg, Oral, TID With Meals, Rogelio Ka, DPM, 1,600 mg at 01/03/22 1705    torsemide (DEMADEX) tablet 100 mg, 100 mg, Oral, Daily, Rogelio Ka, DPM, 100 mg at 01/03/22 1015    warfarin (COUMADIN) tablet 9 mg, 9 mg, Oral, Once per day on Mon Tue Wed Thu Fri Sat, Venus Daily [place], 9 mg at 01/03/22 1704    Laboratory Results:  Results from last 7 days   Lab Units 01/04/22  0751 01/02/22  0803 01/01/22  1409 12/31/21  0545 12/30/21  0843 12/29/21  0721 12/28/21  0931   WBC Thousand/uL 7 53 7 68 6 80 6 85  --  7 16 9 02   HEMOGLOBIN g/dL 7 1* 7 0* 7 4* 7 3* 7 0* 7 0* 6 4*   HEMATOCRIT % 21 5* 21 1* 23 1* 23 1* 21 0* 20 8* 20 2*   PLATELETS Thousands/uL 204 193 196 195  --  173 210   POTASSIUM mmol/L 5 5*  --   --  4 8  --  4 4 4 7   CHLORIDE mmol/L 98*  --   --  103  --  100 101   CO2 mmol/L 25  --   --  28  --  29 23   BUN mg/dL 77*  --   --  50*  --  31* 48*   CREATININE mg/dL 7 07*  --   --  5 72*  --  5 92* 8 17*   CALCIUM mg/dL 8 6  --   --  9 1  --  8 9 8 8

## 2022-01-04 NOTE — PLAN OF CARE
Problem: PHYSICAL THERAPY ADULT  Goal: Performs mobility at highest level of function for planned discharge setting  See evaluation for individualized goals  Description: Treatment/Interventions: ADL retraining,Functional transfer training,LE strengthening/ROM,Patient/family training,Equipment eval/education,Bed mobility,Gait training,Spoke to nursing,Spoke to case management,OT  Equipment Recommended: [facility]       See flowsheet documentation for full assessment, interventions and recommendations  Outcome: Progressing  Note: Prognosis: Fair  Problem List: Decreased strength,Decreased endurance,Impaired balance,Decreased mobility,Decreased safety awareness,Impaired judgement,Obesity,Orthopedic restrictions,Pain  Assessment: Pt seen for PT treatment session this date  Therapy session focused on bed mobility, functional transfers, gait training and therapeutic exercise in order to improve overall mobility and independence  Pt requires assist of 1 for all mobility performed this date  Pt performed bed mobility tasks at S level  Pt performed STS from EOB to RW with mod A x1; cues to maintain NWB on RLE required  Pt performed SPT with mini-hops/ pivot to complete transfer; pt requiring mod Ax1 to maintain NWB on RLE  Pt impulsive at times; cues needed for safety  Pt performed/ tolerated seated b/l LE therex to hips, knees and ankles with no c/o pain/ discomfort  Pt making progress toward goals  Pt was left sitting upright in bedside chair with chair alarm on at the end of PT session with all needs in reach  Pt would benefit from continued PT services while in hospital to address remaining limitations  PT to continue to follow pt and recommends post-acute rehab services  The patient's AM-PAC Basic Mobility Inpatient Short Form Raw Score is 13  A Raw score of less than or equal to 16 suggests the patient may benefit from discharge to post-acute rehabilitation services   Please also refer to the recommendation of the Physical Therapist for safe discharge planning  Barriers to Discharge: Inaccessible home environment        PT Discharge Recommendation: Post acute rehabilitation services          See flowsheet documentation for full assessment

## 2022-01-04 NOTE — PROGRESS NOTES
[address] [facility]  Progress Note Perfecto Lincoln 1965, 49 y o  female MRN: [id number]  Unit/Bed#: [facility] 933-01 Encounter: [id number]  Primary Care Provider: Andry Schroeder MD   Date and time admitted to hospital: 12/20/2021  3:07 PM    Essential hypertension  Assessment & Plan  Monitor blood pressures  Avoid hypotension    Insulin-dependent diabetes mellitus with neuropathy  Assessment & Plan  Lab Results   Component Value Date    HGBA1C 7 9 (H) 09/30/2021     Continue basal/prandial insulin w/ additional SSI coverage per Accu-Cheks  Carbohydrate restricted diet  Continue Gabapentin for neuropathy    Hyperlipidemia  Assessment & Plan  Continue Lipitor    Morbid obesity  Assessment & Plan  BMI of 43 41  Lifestyle/diet modifications    Anemia of chronic disease  Assessment & Plan  Monitor hemoglobin, has been stable around 7-8 for the past several days    -hemoglobin 7 1 this morning  -has an elevated ferritin is not a candidate for iron therapy    ESRD on hemodialysis  Assessment & Plan  Routine hemodialysis per nephrology  Continue Renagel/Sensipar supplementation    History of venous thromboembolism  Assessment & Plan  Warfarin for anticoagulation  Goal INR 2-3, this morning INR 2 2, will continue 9 mg for now and adjust if needed  Monitor INR    * Right foot ulceration with osteomyelitis  Assessment & Plan  Status post transmetatarsal amputation with Podiatry  Patient no monitor off antibiotics Infectious Disease input noted  Podiatry input noted patient is noncompliant with nonweightbearing status in this was discussed with the patient and strongly recommended nonweightbearing status for proper healing  Physical therapy  -pending placement    1/3/22:  Reviewed photos from Podiatry earlier, appears well healed without any erythema, no concern for infection currently, continue to monitor off of antibiotics        VTE Pharmacologic Prophylaxis:   Moderate Risk (Score 3-4) - Pharmacological DVT Prophylaxis Ordered: heparin  Patient Centered Rounds: I performed bedside rounds with nursing staff today  Discussions with Specialists or Other Care Team Provider: n/a    Education and Discussions with Family / Patient: Updated  (significant other) at bedside  Time Spent for Care: 30 minutes  More than 50% of total time spent on counseling and coordination of care as described above  Current Length of Stay: 15 day(s)  Current Patient Status: Inpatient   Certification Statement: The patient will continue to require additional inpatient hospital stay due to Pending discharge to rehab  Discharge Plan: Anticipate discharge in 24-48 hrs to rehab facility  Code Status: Level 1 - Full Code    Subjective:   Patient reports pain is well controlled is alert oriented x3, tolerating oral intake, reports nonweightbearing on her right foot    Objective:     Vitals:   Temp (24hrs), Av 7 °F (36 5 °C), Min:97 6 °F (36 4 °C), Max:97 8 °F (36 6 °C)    Temp:  [97 6 °F (36 4 °C)-97 8 °F (36 6 °C)] 97 8 °F (36 6 °C)  HR:  [62-89] 72  Resp:  [17-18] 17  BP: ()/() 120/84  Body mass index is 43 41 kg/m²  Input and Output Summary (last 24 hours): Intake/Output Summary (Last 24 hours) at 2022 1658  Last data filed at 2022 1300  Gross per 24 hour   Intake 970 ml   Output 3087 ml   Net -2117 ml       Physical Exam:   Physical Exam  Vitals and nursing note reviewed  Constitutional:       General: She is not in acute distress  Appearance: She is well-developed  She is obese  She is not ill-appearing, toxic-appearing or diaphoretic  HENT:      Head: Normocephalic and atraumatic  Eyes:      General: No scleral icterus  Conjunctiva/sclera: Conjunctivae normal    Cardiovascular:      Rate and Rhythm: Normal rate and regular rhythm  Heart sounds: No murmur heard  No friction rub  No gallop      Pulmonary:      Effort: Pulmonary effort is normal  No respiratory distress  Breath sounds: Normal breath sounds  No stridor  No wheezing, rhonchi or rales  Abdominal:      General: There is no distension  Palpations: Abdomen is soft  There is no mass  Tenderness: There is no abdominal tenderness  There is no guarding or rebound  Hernia: No hernia is present  Musculoskeletal:         General: No swelling, tenderness, deformity or signs of injury  Cervical back: Neck supple  Left lower leg: Edema present  Comments: Right foot dressing clean dry intact   Skin:     General: Skin is warm and dry  Coloration: Skin is not jaundiced or pale  Findings: No bruising, erythema, lesion or rash  Neurological:      Mental Status: She is alert and oriented to person, place, and time  Additional Data:     Labs:  Results from last 7 days   Lab Units 01/04/22  0751   WBC Thousand/uL 7 53   HEMOGLOBIN g/dL 7 1*   HEMATOCRIT % 21 5*   PLATELETS Thousands/uL 204   NEUTROS PCT % 68   LYMPHS PCT % 15   MONOS PCT % 9   EOS PCT % 6     Results from last 7 days   Lab Units 01/04/22  0751   SODIUM mmol/L 130*   POTASSIUM mmol/L 5 5*   CHLORIDE mmol/L 98*   CO2 mmol/L 25   BUN mg/dL 77*   CREATININE mg/dL 7 07*   ANION GAP mmol/L 7   CALCIUM mg/dL 8 6   GLUCOSE RANDOM mg/dL 158*     Results from last 7 days   Lab Units 01/04/22  0751   INR  2 11*     Results from last 7 days   Lab Units 01/04/22  1649 01/03/22  2131 01/03/22  1119 01/03/22  0725 01/03/22  0424 01/02/22  2211 01/02/22  2107 01/02/22  1627 01/02/22  1216 01/01/22  2053 01/01/22  1643 01/01/22  1219   POC GLUCOSE mg/dl 181* 165* 147* 178* 164* 139 43* 151* 89 155* 186* 221*               Lines/Drains:  Invasive Devices  Report    Peripheral Intravenous Line            Peripheral IV 01/03/22 Right Antecubital 1 day          Line            Hemodialysis AV Fistula 02/20/18 Left Forearm 1414 days                      Imaging: No pertinent imaging reviewed      Recent Cultures (last 7 days):         Last 24 Hours Medication List:   Current Facility-Administered Medications   Medication Dose Route Frequency Provider Last Rate    acetaminophen  650 mg Oral Q6H PRN Ressie Kezia, DPM      albuterol  2 puff Inhalation Q6H PRN Ressie Kezia, DPM      ALPRAZolam  0 25 mg Oral 4x Daily PRN Ressie Kezia, DPM      ammonium lactate   Topical BID PRN Ressie Kezia, DPM      calcium carbonate  500 mg Oral TID PRN Eugenia Jain MD      carvedilol  25 mg Oral BID With Meals Ressie Kezia, DPM      cinacalcet  30 mg Oral Daily Ressie Kezia, [place]      Diclofenac Sodium  2 g Topical 4x Daily Eugenia Jain MD      fentaNYL  25 mcg Intravenous Q5 Min PRN Skye De Jesus CRNA      gabapentin  100 mg Oral TID Ressie Kezia, DPM      heparin (porcine)  5,000 Units Subcutaneous Q12H [address] Ressie Kezia, [place]      hydrALAZINE  5 mg Intravenous Q6H PRN Ressie Kezia, DPM      HYDROmorphone  0 2 mg Intravenous Q4H PRN Ferdinand Banai, DO      insulin glargine  20 Units Subcutaneous HS Ressie Kezai, DPM      insulin lispro  1-5 Units Subcutaneous TID [facility] Ressie Kezia, DPM      insulin lispro  3 Units Subcutaneous TID With Meals Ressie Kezia, DPM      levothyroxine  50 mcg Oral Daily Ressie Kezia, DPM      lidocaine  1 patch Topical Daily Eugenia Jain MD      melatonin  3 mg Oral HS Ressie Kezia, DPM      metoclopramide  10 mg Intravenous Q6H PRN Melita eBckwith PA-C      NIFEdipine  30 mg Oral Daily Ressie Kezia, [place]      nystatin   Topical BID Ressie Kezia, DPM      ondansetron  4 mg Intravenous Once PRN Skye De Jesus CRNA      oxyCODONE  7 5 mg Oral Q4H PRN Ferdinand Banai, DO      Or    oxyCODONE  12 5 mg Oral Q4H PRN Ferdinand Banai, DO      pantoprazole  40 mg Oral Early Morning Ressie Kezia, DPM      polyethylene glycol  17 g Oral Daily PRN Ressie Kezia, DPM      senna  2 tablet Oral HS PRN Ressie Kezia, DPM      sertraline  75 mg Oral Daily Dilma Mast [place]      sevelamer  1,600 mg Oral TID With Meals Iverson Spatz, DPM      torsemide  100 mg Oral Daily Iverson Spatz, [place]      warfarin  9 mg Oral Once per day on Mon Tue Wed Thu Fri Sat Junior Mian DO          Today, Patient Was Seen By: Junior Mian DO    **Please Note: This note may have been constructed using a voice recognition system  **

## 2022-01-04 NOTE — ASSESSMENT & PLAN NOTE
Monitor hemoglobin, has been stable around 7-8 for the past several days    -hemoglobin 7 1 this morning  -has an elevated ferritin is not a candidate for iron therapy

## 2022-01-04 NOTE — ASSESSMENT & PLAN NOTE
Warfarin for anticoagulation  Goal INR 2-3, this morning INR 2 2, will continue 9 mg for now and adjust if needed  Monitor INR

## 2022-01-04 NOTE — PROGRESS NOTES
Pastoral Care Progress Note    2022  Patient: Mitch Rodrigez : 1965  Admission Date & Time: 2021 1507  MRN: [id number] CSN: [id number]       visited at bedside to continue spiritual support and care  Pt concerned about her morning meds being missed    spoke with nurse to understand timeline and shared with pt in order to contain anxiety             Chaplaincy Interventions Utilized:   Relationship Building: Cultivated a relationship of care and support and Listened empathically      22 1500   Clinical Encounter Type   Visited With Patient   Routine Visit Follow-up   Yarsanism Encounters   Yarsanism Needs Prayer

## 2022-01-05 LAB
ANION GAP SERPL CALCULATED.3IONS-SCNC: 5 MMOL/L (ref 4–13)
BASOPHILS # BLD AUTO: 0.03 THOUSANDS/ΜL (ref 0–0.1)
BASOPHILS NFR BLD AUTO: 1 % (ref 0–1)
BUN SERPL-MCNC: 57 MG/DL (ref 5–25)
CALCIUM SERPL-MCNC: 8.5 MG/DL (ref 8.3–10.1)
CHLORIDE SERPL-SCNC: 102 MMOL/L (ref 100–108)
CO2 SERPL-SCNC: 27 MMOL/L (ref 21–32)
CREAT SERPL-MCNC: 5.44 MG/DL (ref 0.6–1.3)
EOSINOPHIL # BLD AUTO: 0.39 THOUSAND/ΜL (ref 0–0.61)
EOSINOPHIL NFR BLD AUTO: 6 % (ref 0–6)
ERYTHROCYTE [DISTWIDTH] IN BLOOD BY AUTOMATED COUNT: 15.9 % (ref 11.6–15.1)
GFR SERPL CREATININE-BSD FRML MDRD: 8 ML/MIN/1.73SQ M
GLUCOSE SERPL-MCNC: 116 MG/DL (ref 65–140)
GLUCOSE SERPL-MCNC: 143 MG/DL (ref 65–140)
GLUCOSE SERPL-MCNC: 188 MG/DL (ref 65–140)
GLUCOSE SERPL-MCNC: 78 MG/DL (ref 65–140)
HCT VFR BLD AUTO: 20.1 % (ref 34.8–46.1)
HGB BLD-MCNC: 6.5 G/DL (ref 11.5–15.4)
IMM GRANULOCYTES # BLD AUTO: 0.05 THOUSAND/UL (ref 0–0.2)
IMM GRANULOCYTES NFR BLD AUTO: 1 % (ref 0–2)
LYMPHOCYTES # BLD AUTO: 1.14 THOUSANDS/ΜL (ref 0.6–4.47)
LYMPHOCYTES NFR BLD AUTO: 18 % (ref 14–44)
MCH RBC QN AUTO: 31 PG (ref 26.8–34.3)
MCHC RBC AUTO-ENTMCNC: 32.3 G/DL (ref 31.4–37.4)
MCV RBC AUTO: 96 FL (ref 82–98)
MONOCYTES # BLD AUTO: 0.64 THOUSAND/ΜL (ref 0.17–1.22)
MONOCYTES NFR BLD AUTO: 10 % (ref 4–12)
NEUTROPHILS # BLD AUTO: 4.24 THOUSANDS/ΜL (ref 1.85–7.62)
NEUTS SEG NFR BLD AUTO: 64 % (ref 43–75)
NRBC BLD AUTO-RTO: 0 /100 WBCS
PLATELET # BLD AUTO: 170 THOUSANDS/UL (ref 149–390)
PMV BLD AUTO: 9.6 FL (ref 8.9–12.7)
POTASSIUM SERPL-SCNC: 4.7 MMOL/L (ref 3.5–5.3)
RBC # BLD AUTO: 2.1 MILLION/UL (ref 3.81–5.12)
SODIUM SERPL-SCNC: 134 MMOL/L (ref 136–145)
WBC # BLD AUTO: 6.49 THOUSAND/UL (ref 4.31–10.16)

## 2022-01-05 PROCEDURE — 5A1D70Z PERFORMANCE OF URINARY FILTRATION, INTERMITTENT, LESS THAN 6 HOURS PER DAY: ICD-10-PCS | Performed by: INTERNAL MEDICINE

## 2022-01-05 PROCEDURE — 80048 BASIC METABOLIC PNL TOTAL CA: CPT | Performed by: INTERNAL MEDICINE

## 2022-01-05 PROCEDURE — 99232 SBSQ HOSP IP/OBS MODERATE 35: CPT | Performed by: INTERNAL MEDICINE

## 2022-01-05 PROCEDURE — 85025 COMPLETE CBC W/AUTO DIFF WBC: CPT | Performed by: INTERNAL MEDICINE

## 2022-01-05 PROCEDURE — 82948 REAGENT STRIP/BLOOD GLUCOSE: CPT

## 2022-01-05 RX ORDER — BACITRACIN, NEOMYCIN, POLYMYXIN B 400; 3.5; 5 [USP'U]/G; MG/G; [USP'U]/G
1 OINTMENT TOPICAL 2 TIMES DAILY
Status: DISCONTINUED | OUTPATIENT
Start: 2022-01-05 | End: 2022-01-12 | Stop reason: HOSPADM

## 2022-01-05 RX ADMIN — DICLOFENAC SODIUM 2 G: 10 GEL TOPICAL at 14:14

## 2022-01-05 RX ADMIN — PANTOPRAZOLE SODIUM 40 MG: 40 TABLET, DELAYED RELEASE ORAL at 05:01

## 2022-01-05 RX ADMIN — TORSEMIDE 100 MG: 20 TABLET ORAL at 09:33

## 2022-01-05 RX ADMIN — OXYCODONE HYDROCHLORIDE 12.5 MG: 5 TABLET ORAL at 14:12

## 2022-01-05 RX ADMIN — HEPARIN SODIUM 5000 UNITS: 5000 INJECTION INTRAVENOUS; SUBCUTANEOUS at 21:24

## 2022-01-05 RX ADMIN — Medication 3 MG: at 21:24

## 2022-01-05 RX ADMIN — OXYCODONE HYDROCHLORIDE 12.5 MG: 5 TABLET ORAL at 09:33

## 2022-01-05 RX ADMIN — DICLOFENAC SODIUM 2 G: 10 GEL TOPICAL at 09:34

## 2022-01-05 RX ADMIN — WARFARIN SODIUM 9 MG: 7.5 TABLET ORAL at 17:17

## 2022-01-05 RX ADMIN — CARVEDILOL 25 MG: 25 TABLET, FILM COATED ORAL at 17:17

## 2022-01-05 RX ADMIN — BACITRACIN ZINC, NEOMYCIN, POLYMYXIN B 1 SMALL APPLICATION: 400; 3.5; 5 OINTMENT TOPICAL at 17:19

## 2022-01-05 RX ADMIN — NYSTATIN: 100000 POWDER TOPICAL at 09:46

## 2022-01-05 RX ADMIN — LEVOTHYROXINE SODIUM 50 MCG: 50 TABLET ORAL at 09:34

## 2022-01-05 RX ADMIN — ALPRAZOLAM 0.25 MG: 0.25 TABLET ORAL at 21:29

## 2022-01-05 RX ADMIN — INSULIN GLARGINE 20 UNITS: 100 INJECTION, SOLUTION SUBCUTANEOUS at 21:24

## 2022-01-05 RX ADMIN — CINACALCET 30 MG: 30 TABLET, FILM COATED ORAL at 17:17

## 2022-01-05 RX ADMIN — HEPARIN SODIUM 5000 UNITS: 5000 INJECTION INTRAVENOUS; SUBCUTANEOUS at 09:33

## 2022-01-05 RX ADMIN — SERTRALINE HYDROCHLORIDE 75 MG: 50 TABLET ORAL at 09:33

## 2022-01-05 RX ADMIN — DICLOFENAC SODIUM 2 G: 10 GEL TOPICAL at 17:09

## 2022-01-05 RX ADMIN — ALPRAZOLAM 0.25 MG: 0.25 TABLET ORAL at 09:52

## 2022-01-05 RX ADMIN — SEVELAMER HYDROCHLORIDE 1600 MG: 800 TABLET, FILM COATED PARENTERAL at 14:11

## 2022-01-05 RX ADMIN — SEVELAMER HYDROCHLORIDE 1600 MG: 800 TABLET, FILM COATED PARENTERAL at 09:34

## 2022-01-05 RX ADMIN — GABAPENTIN 100 MG: 100 CAPSULE ORAL at 09:34

## 2022-01-05 RX ADMIN — LIDOCAINE 5% 1 PATCH: 700 PATCH TOPICAL at 09:33

## 2022-01-05 RX ADMIN — GABAPENTIN 100 MG: 100 CAPSULE ORAL at 21:24

## 2022-01-05 RX ADMIN — INSULIN LISPRO 3 UNITS: 100 INJECTION, SOLUTION INTRAVENOUS; SUBCUTANEOUS at 17:19

## 2022-01-05 RX ADMIN — OXYCODONE HYDROCHLORIDE 7.5 MG: 5 TABLET ORAL at 19:40

## 2022-01-05 RX ADMIN — SEVELAMER HYDROCHLORIDE 1600 MG: 800 TABLET, FILM COATED PARENTERAL at 17:16

## 2022-01-05 RX ADMIN — GABAPENTIN 100 MG: 100 CAPSULE ORAL at 17:16

## 2022-01-05 RX ADMIN — NYSTATIN: 100000 POWDER TOPICAL at 17:20

## 2022-01-05 RX ADMIN — INSULIN LISPRO 3 UNITS: 100 INJECTION, SOLUTION INTRAVENOUS; SUBCUTANEOUS at 14:12

## 2022-01-05 RX ADMIN — INSULIN LISPRO 3 UNITS: 100 INJECTION, SOLUTION INTRAVENOUS; SUBCUTANEOUS at 09:34

## 2022-01-05 NOTE — PROGRESS NOTES
[address] [facility]  Progress Note Jonathon Comer 1965, 49 y o  female MRN: [id number]  Unit/Bed#: [facility] 933-01 Encounter: [id number]  Primary Care Provider: Mickey Marie MD   Date and time admitted to hospital: 12/20/2021  3:07 PM    Essential hypertension  Assessment & Plan  Monitor blood pressures  Avoid hypotension    Insulin-dependent diabetes mellitus with neuropathy  Assessment & Plan  Lab Results   Component Value Date    HGBA1C 7 9 (H) 09/30/2021     Continue basal/prandial insulin w/ additional SSI coverage per Accu-Cheks  Carbohydrate restricted diet  Continue Gabapentin for neuropathy    Hyperlipidemia  Assessment & Plan  Continue Lipitor    Morbid obesity  Assessment & Plan  BMI of 43 41  Lifestyle/diet modifications    Anemia of chronic disease  Assessment & Plan  Monitor hemoglobin, has been stable around 7-8 for the past several days  -hemoglobin 6 5 this morning, no signs of bleeding and acute need for transfusion currently will plan to transfuse tomorrow during for dialysis  -has an elevated ferritin is not a candidate for iron therapy    ESRD on hemodialysis  Assessment & Plan  Routine hemodialysis per nephrology  Continue Renagel/Sensipar supplementation    1/5/22:   Will plan for 1 unit of packed red blood cell transfusion during dialysis tomorrow on 01/06/2022, will then check CBC and if hemoglobin is above 7 will be stable to discharge to rehab [facility] were her dialysis is set up with [facility]    History of venous thromboembolism  Assessment & Plan  Warfarin for anticoagulation  Goal INR 2-3, yesterday morning INR 2 2, will continue 9 mg for now and adjust if needed  -will recheck INR tomorrow 01/06/2021    * Right foot ulceration with osteomyelitis  Assessment & Plan  Status post transmetatarsal amputation with Podiatry  Patient no monitor off antibiotics Infectious Disease input noted  Podiatry input noted patient is noncompliant with nonweightbearing status in this was discussed with the patient and strongly recommended nonweightbearing status for proper healing  Physical therapy  -pending placement    1/3/22:  Reviewed photos from Podiatry earlier, appears well healed without any erythema, no concern for infection currently, continue to monitor off of antibiotics        VTE Pharmacologic Prophylaxis:   High Risk (Score >/= 5) - Pharmacological DVT Prophylaxis Ordered: heparin  Sequential Compression Devices Ordered  Patient Centered Rounds: I performed bedside rounds with nursing staff today  Discussions with Specialists or Other Care Team Provider: n/a    Education and Discussions with Family / Patient: Updated  (significant other) at bedside  Time Spent for Care: 30 minutes  More than 50% of total time spent on counseling and coordination of care as described above  Current Length of Stay: 16 day(s)  Current Patient Status: Inpatient   Certification Statement: The patient will continue to require additional inpatient hospital stay due to Pending placement to rehab likely this Friday on 2022  Discharge Plan: Anticipate discharge in 24-48 hrs to rehab facility  Code Status: Level 1 - Full Code    Subjective:   Patient denies any acute complaints, reports pain is well controlled and is okay without IV Dilaudid  Reports she has been nonweightbearing on her right foot  Denies any hematochezia, melena, hemoptysis, or any other signs bleeding  Alert and oriented x4    Objective:     Vitals:   Temp (24hrs), Av 5 °F (36 9 °C), Min:98 3 °F (36 8 °C), Max:98 6 °F (37 °C)    Temp:  [98 3 °F (36 8 °C)-98 6 °F (37 °C)] 98 6 °F (37 °C)  HR:  [77] 77  Resp:  [18] 18  BP: (97-98)/(65-70) 97/65  Body mass index is 43 41 kg/m²  Input and Output Summary (last 24 hours):      Intake/Output Summary (Last 24 hours) at 2022 1234  Last data filed at 2022 0839  Gross per 24 hour   Intake 1380 ml   Output --   Net 1380 ml       Physical Exam:   Physical Exam  Vitals and nursing note reviewed  Constitutional:       General: She is not in acute distress  Appearance: She is well-developed  She is obese  She is not ill-appearing, toxic-appearing or diaphoretic  HENT:      Head: Normocephalic and atraumatic  Eyes:      General: No scleral icterus  Conjunctiva/sclera: Conjunctivae normal    Cardiovascular:      Rate and Rhythm: Normal rate and regular rhythm  Heart sounds: No murmur heard  No friction rub  No gallop  Pulmonary:      Effort: Pulmonary effort is normal  No respiratory distress  Breath sounds: Normal breath sounds  No stridor  No wheezing, rhonchi or rales  Abdominal:      General: There is no distension  Palpations: Abdomen is soft  There is no mass  Tenderness: There is no abdominal tenderness  There is no guarding or rebound  Hernia: No hernia is present  Musculoskeletal:         General: No swelling, tenderness, deformity or signs of injury  Cervical back: Neck supple  Left lower leg: Edema present  Comments: Right foot dressing clean dry intact   Skin:     General: Skin is warm and dry  Coloration: Skin is not jaundiced or pale  Findings: No bruising, erythema, lesion or rash  Neurological:      Mental Status: She is alert and oriented to person, place, and time            Additional Data:     Labs:  Results from last 7 days   Lab Units 01/05/22  0653   WBC Thousand/uL 6 49   HEMOGLOBIN g/dL 6 5*   HEMATOCRIT % 20 1*   PLATELETS Thousands/uL 170   NEUTROS PCT % 64   LYMPHS PCT % 18   MONOS PCT % 10   EOS PCT % 6     Results from last 7 days   Lab Units 01/05/22  0658   SODIUM mmol/L 134*   POTASSIUM mmol/L 4 7   CHLORIDE mmol/L 102   CO2 mmol/L 27   BUN mg/dL 57*   CREATININE mg/dL 5 44*   ANION GAP mmol/L 5   CALCIUM mg/dL 8 5   GLUCOSE RANDOM mg/dL 78     Results from last 7 days   Lab Units 01/04/22  0751   INR  2 11*     Results from last 7 days   Lab Units 01/05/22  1150 01/04/22  2034 01/04/22  1649 01/03/22  2131 01/03/22  1119 01/03/22  0725 01/03/22  0424 01/02/22  2211 01/02/22  2107 01/02/22  1627 01/02/22  1216 01/01/22  2053   POC GLUCOSE mg/dl 116 126 181* 165* 147* 178* 164* 139 43* 151* 89 155*               Lines/Drains:  Invasive Devices  Report    Peripheral Intravenous Line            Peripheral IV 01/03/22 Right Antecubital 2 days          Line            Hemodialysis AV Fistula 02/20/18 Left Forearm 1414 days                      Imaging: No pertinent imaging reviewed      Recent Cultures (last 7 days):         Last 24 Hours Medication List:   Current Facility-Administered Medications   Medication Dose Route Frequency Provider Last Rate    acetaminophen  650 mg Oral Q6H PRN Ressie Kezia, DPM      albuterol  2 puff Inhalation Q6H PRN Ressie Kezia, DPM      ALPRAZolam  0 25 mg Oral 4x Daily PRN Ressie Kezia, DPM      ammonium lactate   Topical BID PRN Ressie Kezia, DPM      calcium carbonate  500 mg Oral TID PRN Eugenia Jain MD      carvedilol  25 mg Oral BID With Meals Ressie Kezia, DPM      [facility]alc[facility]  [facility]      Diclofenac Sodium  2 g Topical 4x Daily Eugenia Jain MD      fentaNYL  25 mcg Intravenous Q5 Min PRN Skye De Jesus CRNA      gabapentin  100 mg Oral TID Ressie Kezia, DPM      heparin (porcine)  5,000 Units Subcutaneous Q12H [facility]      hydrALAZINE  5 mg Intravenous Q6H PRN Ressie Kezia, DPM      insulin glargine  20 Units Subcutaneous HS Ressie Kezia, DPM      insulin lispro  1-5 Units Subcutaneous TID [facility] [place] Ressie Kezia, DPM      insulin lispro  3 Units Subcutaneous TID With Meals Ressie Kezia, DPM      levothyroxine  50 mcg Oral Daily Ressie Kezia, DPM      lidocaine  1 patch Topical Daily Eugenia Jain MD      melatonin  3 mg Oral HS Ressie Kezia, DPM      metoclopramide  10 mg Intravenous Q6H PRN Melita Beckwith PA-C      NIFEdipine  30 mg Oral Daily Caren Black, NAT      nystatin   Topical BID Caren Black DPM      ondansetron  4 mg Intravenous Once PRN Lashanda Machuca CRNA      oxyCODONE  7 5 mg Oral Q4H PRN Ferdinand Banai, DO      Or    oxyCODONE  12 5 mg Oral Q4H PRN Ferdinand Banai, DO      pantoprazole  40 mg Oral Early Morning Caren Black DPM      polyethylene glycol  17 g Oral Daily PRN Caren Black DPM      senna  2 tablet Oral HS PRN Caren Black DPM      sertraline  75 mg Oral Daily Elvira Johnson      sevelamer  1,600 mg Oral TID With Meals Caren Black DPM      torsemide  100 mg Oral Daily Caren Black DPM      warfarin  9 mg Oral Once per day on Mon Tue Wed Thu Fri Sat Sandy Servin DO          Today, Patient Was Seen By: Sandy Servin DO    **Please Note: This note may have been constructed using a voice recognition system  **

## 2022-01-05 NOTE — ASSESSMENT & PLAN NOTE
Warfarin for anticoagulation  Goal INR 2-3, yesterday morning INR 2 2, will continue 9 mg for now and adjust if needed  -will recheck INR tomorrow 01/06/2021

## 2022-01-05 NOTE — ASSESSMENT & PLAN NOTE
Routine hemodialysis per nephrology  Continue Renagel/Sensipar supplementation    1/5/22:   Will plan for 1 unit of packed red blood cell transfusion during dialysis tomorrow on 01/06/2022, will then check CBC and if hemoglobin is above 7 will be stable to discharge to rehab [facility] were her dialysis is set up with [facility]

## 2022-01-05 NOTE — ASSESSMENT & PLAN NOTE
Monitor hemoglobin, has been stable around 7-8 for the past several days    -hemoglobin 6 5 this morning, no signs of bleeding and acute need for transfusion currently will plan to transfuse tomorrow during for dialysis  -has an elevated ferritin is not a candidate for iron therapy

## 2022-01-05 NOTE — PROGRESS NOTES
Pradeep 52 PROGRESS NOTE   Diana Rivers 49 y o  female MRN: [id number]  [facility] Encounter: [id number]  Reason for Consult: ESRD    ASSESSMENT and PLAN:    53-year-old female with a past medical history of ESRD, hypertension, diabetes, GERD, obesity, DVT who initially presents on 12/21 with lower extremity foot wound   Nephrology is consulted for ESRD  Diane Roland is concern for osteomyelitis     1) ESRD     -outpatient unit is [address]  -access left upper extremity AV fistula  -patient had dialysis last on Sunday due to holiday schedule     Plan  -next dialysis Thursday  -transfusion during dialysis per primary attending planned  -please repeat CBC after dialysis tomorrow afternoon  -as long as the hemoglobin is improving appropriately, okay for final disposition to [facility] rehab  Patient will receive dialysis at [facility] starting on Friday potentially  -reviewed with primary team attending and the patient     2) volume     -ultrafiltrate with dialysis as tolerates     3) osteomyelitis     -per primary team  -status post right TMA  -patient being encouraged by Podiatry team to avoid weight-bearing as the patient is not compliant with this as often as should be 3     4) anemia     -no MITCHELL due to history of PE  -not a candidate for IV iron as last ferritin was above 1300  -will recheck iron levels as outpatient     5) hypertension     -monitor with current regimen     6) MBD-on phosphorus binders     -on Sensipar also    SUBJECTIVE / 24H INTERVAL HISTORY:    Patient denies complaints  No shortness of breath      OBJECTIVE:  Current Weight: Weight - Scale: 122 kg (268 lb 15 4 oz)  Vitals:    01/04/22 1030 01/04/22 1100 01/04/22 2225 01/05/22 0839   BP: (!) 120/40 120/84 98/70 97/65   BP Location: Right arm Right arm     Pulse: 72 72 77    Resp: 17 17 18 18   Temp:  97 8 °F (36 6 °C) 98 3 °F (36 8 °C) 98 6 °F (37 °C)   TempSrc:  Oral     SpO2:       Weight:       Height: Intake/Output Summary (Last 24 hours) at 1/5/2022 1309  Last data filed at 1/5/2022 0839  Gross per 24 hour   Intake 1140 ml   Output --   Net 1140 ml     General: NAD  Skin: no rash  Eyes: anicteric sclera  ENT: moist mucous membrane  Neck: supple  Chest: CTA b/l, no ronchii, no wheeze, no rubs, no rales  CVS: s1s2, no murmur, no gallop, no rub  Abdomen: soft, nontender, nl sounds  Extremities:  1+ edema LE b/l  : no hernandez  Neuro: AAOX3  Psych: normal affect    Medications:    Current Facility-Administered Medications:     acetaminophen (TYLENOL) tablet 650 mg, 650 mg, Oral, Q6H PRN, Rogelio Ka, DPM, 650 mg at 12/28/21 1721    albuterol (PROVENTIL HFA,VENTOLIN HFA) inhaler 2 puff, 2 puff, Inhalation, Q6H PRN, Rogelio Fleming, DPM    ALPRAZolam Jaylon [place]) tablet 0 25 mg, 0 25 mg, Oral, 4x Daily PRN, Rogelio Fleming, DPM, 0 25 mg at 01/05/22 0952    ammonium lactate (LAC-HYDRIN) 12 % cream, , Topical, BID PRN, Rogelio Fleming, DPM, Given at 12/21/21 2207    calcium carbonate (TUMS) chewable tablet 500 mg, 500 mg, Oral, TID PRN, Lexi Ramírez MD, 500 mg at 12/30/21 2343    carvedilol (COREG) tablet 25 mg, 25 mg, Oral, BID With Meals, Rogelio Fleming, DPM, 25 mg at 01/04/22 1845    cinacalcet (SENSIPAR) tablet 30 mg, 30 mg, Oral, Daily, Rogelio Fleming, DPM, 30 mg at 01/04/22 1507    Diclofenac Sodium (VOLTAREN) 1 % topical gel 2 g, 2 g, Topical, 4x Daily, Lexi Raímrez MD, 2 g at 01/05/22 0934    fentaNYL (SUBLIMAZE) injection 25 mcg, 25 mcg, Intravenous, Q5 Min PRN, Adamaris Shipman CRNA    gabapentin (NEURONTIN) capsule 100 mg, 100 mg, Oral, TID, Rogelio Ka, DPM, 100 mg at 01/05/22 0934    heparin (porcine) subcutaneous injection 5,000 Units, 5,000 Units, Subcutaneous, Q12H [facility], Rogelio Fleming DPM, 5,000 Units at 01/05/22 0933    hydrALAZINE (APRESOLINE) injection 5 mg, 5 mg, Intravenous, Q6H PRN, Rogelio Fleming, DPM, 5 mg at 12/21/21 0047    insulin glargine (LANTUS) subcutaneous injection 20 Units 0 2 mL, 20 Units, Subcutaneous, HS, Preconeyda Mims DPM, 20 Units at 01/04/22 2127    insulin lispro (HumaLOG) 100 units/mL subcutaneous injection 1-5 Units, 1-5 Units, Subcutaneous, TID AC, 1 Units at 01/04/22 1843 **AND** Fingerstick Glucose (POCT), , , TID AC, Precilla Prasanna, DPM    insulin lispro (HumaLOG) 100 units/mL subcutaneous injection 3 Units, 3 Units, Subcutaneous, TID With Meals, Moise Mims DPM, 3 Units at 01/05/22 0934    levothyroxine tablet 50 mcg, 50 mcg, Oral, Daily, Moise Mims DPJULITA, 50 mcg at 01/05/22 0934    lidocaine (LIDODERM) 5 % patch 1 patch, 1 patch, Topical, Daily, Leif Vickers MD, 1 patch at 01/05/22 0933    melatonin tablet 3 mg, 3 mg, Oral, HS, Preconeyda Mims DPM, 3 mg at 01/04/22 2126    metoclopramide (REGLAN) injection 10 mg, 10 mg, Intravenous, Q6H PRN, Melita Beckwith PA-C    NIFEdipine (PROCARDIA XL) 24 hr tablet 30 mg, 30 mg, Oral, Daily, Moise Mims DPM, 30 mg at 01/04/22 1505    nystatin (MYCOSTATIN) powder, , Topical, BID, Moise Mims DPJULITA, Given at 01/05/22 0946    ondansetron [facility]) injection 4 mg, 4 mg, Intravenous, Once PRN, Lizzette Alberto CRNA    oxyCODONE (ROXICODONE) IR tablet 7 5 mg, 7 5 mg, Oral, Q4H PRN, 7 5 mg at 01/04/22 1850 **OR** oxyCODONE (ROXICODONE) IR tablet 12 5 mg, 12 5 mg, Oral, Q4H PRN, Ferdinand Cardona DO, 12 5 mg at 01/05/22 0933    pantoprazole (PROTONIX) EC tablet 40 mg, 40 mg, Oral, Early Morning, Moise Mims DPM, 40 mg at 01/05/22 0501    polyethylene glycol (MIRALAX) packet 17 g, 17 g, Oral, Daily PRN, Precilla Prasanna, DPM    senna (SENOKOT) tablet 17 2 mg, 2 tablet, Oral, HS PRN, Precilla Prasanna, DPM    sertraline (ZOLOFT) tablet 75 mg, 75 mg, Oral, Daily, Precilla Prasanna, DPM, 75 mg at 01/05/22 0933    sevelamer (RENAGEL) tablet 1,600 mg, 1,600 mg, Oral, TID With Meals, Precilla Prasanna, DPM, 1,600 mg at 01/05/22 0934    torsemide (DEMADEX) tablet 100 mg, 100 mg, Oral, Daily, Precilla Prasanna, DPM, 100 mg at 01/05/22 0933    warfarin (COUMADIN) tablet 9 mg, 9 mg, Oral, Once per day on Mon Tue Wed Thu Cam Red [facility] DO Loren, 9 mg at 01/04/22 1845    Laboratory Results:  Results from last 7 days   Lab Units 01/05/22  0658 01/05/22  0653 01/04/22  0751 01/02/22  0803 01/01/22  1409 12/31/21  0545 12/30/21  0843   WBC Thousand/uL  --  6 49 7 53 7 68 6 80 6 85  --    HEMOGLOBIN g/dL  --  6 5* 7 1* 7 0* 7 4* 7 3* 7 0*   HEMATOCRIT %  --  20 1* 21 5* 21 1* 23 1* 23 1* 21 0*   PLATELETS Thousands/uL  --  170 204 193 196 195  --    POTASSIUM mmol/L 4 7  --  5 5*  --   --  4 8  --    CHLORIDE mmol/L 102  --  98*  --   --  103  --    CO2 mmol/L 27  --  25  --   --  28  --    BUN mg/dL 57*  --  77*  --   --  50*  --    CREATININE mg/dL 5 44*  --  7 07*  --   --  5 72*  --    CALCIUM mg/dL 8 5  --  8 6  --   --  9 1  --

## 2022-01-06 ENCOUNTER — APPOINTMENT (INPATIENT)
Dept: DIALYSIS | Facility: HOSPITAL | Age: 57
DRG: 617 | End: 2022-01-06
Payer: MEDICARE

## 2022-01-06 LAB
ABO GROUP BLD: NORMAL
ANION GAP SERPL CALCULATED.3IONS-SCNC: 9 MMOL/L (ref 4–13)
BLD GP AB SCN SERPL QL: NEGATIVE
BUN SERPL-MCNC: 72 MG/DL (ref 5–25)
CALCIUM SERPL-MCNC: 8.7 MG/DL (ref 8.3–10.1)
CHLORIDE SERPL-SCNC: 100 MMOL/L (ref 100–108)
CO2 SERPL-SCNC: 25 MMOL/L (ref 21–32)
CREAT SERPL-MCNC: 7.29 MG/DL (ref 0.6–1.3)
ERYTHROCYTE [DISTWIDTH] IN BLOOD BY AUTOMATED COUNT: 15.9 % (ref 11.6–15.1)
ERYTHROCYTE [DISTWIDTH] IN BLOOD BY AUTOMATED COUNT: 17.5 % (ref 11.6–15.1)
GFR SERPL CREATININE-BSD FRML MDRD: 5 ML/MIN/1.73SQ M
GLUCOSE SERPL-MCNC: 132 MG/DL (ref 65–140)
GLUCOSE SERPL-MCNC: 146 MG/DL (ref 65–140)
GLUCOSE SERPL-MCNC: 242 MG/DL (ref 65–140)
HBV CORE AB SER QL: NORMAL
HBV SURFACE AB SER-ACNC: 134.62 MIU/ML
HBV SURFACE AG SER QL: NORMAL
HCT VFR BLD AUTO: 21.8 % (ref 34.8–46.1)
HCT VFR BLD AUTO: 23.1 % (ref 34.8–46.1)
HGB BLD-MCNC: 7 G/DL (ref 11.5–15.4)
HGB BLD-MCNC: 7.7 G/DL (ref 11.5–15.4)
INR PPP: 1.94 (ref 0.84–1.19)
MCH RBC QN AUTO: 30.7 PG (ref 26.8–34.3)
MCH RBC QN AUTO: 30.8 PG (ref 26.8–34.3)
MCHC RBC AUTO-ENTMCNC: 32.1 G/DL (ref 31.4–37.4)
MCHC RBC AUTO-ENTMCNC: 33.3 G/DL (ref 31.4–37.4)
MCV RBC AUTO: 92 FL (ref 82–98)
MCV RBC AUTO: 96 FL (ref 82–98)
PLATELET # BLD AUTO: 143 THOUSANDS/UL (ref 149–390)
PLATELET # BLD AUTO: 199 THOUSANDS/UL (ref 149–390)
PMV BLD AUTO: 10.6 FL (ref 8.9–12.7)
PMV BLD AUTO: 9.9 FL (ref 8.9–12.7)
POTASSIUM SERPL-SCNC: 4.9 MMOL/L (ref 3.5–5.3)
PROTHROMBIN TIME: 21.2 SECONDS (ref 11.6–14.5)
RBC # BLD AUTO: 2.27 MILLION/UL (ref 3.81–5.12)
RBC # BLD AUTO: 2.51 MILLION/UL (ref 3.81–5.12)
RH BLD: POSITIVE
SODIUM SERPL-SCNC: 134 MMOL/L (ref 136–145)
SPECIMEN EXPIRATION DATE: NORMAL
WBC # BLD AUTO: 6.6 THOUSAND/UL (ref 4.31–10.16)
WBC # BLD AUTO: 7.45 THOUSAND/UL (ref 4.31–10.16)

## 2022-01-06 PROCEDURE — 86706 HEP B SURFACE ANTIBODY: CPT | Performed by: INTERNAL MEDICINE

## 2022-01-06 PROCEDURE — 86901 BLOOD TYPING SEROLOGIC RH(D): CPT | Performed by: INTERNAL MEDICINE

## 2022-01-06 PROCEDURE — 86704 HEP B CORE ANTIBODY TOTAL: CPT | Performed by: INTERNAL MEDICINE

## 2022-01-06 PROCEDURE — 86900 BLOOD TYPING SEROLOGIC ABO: CPT | Performed by: INTERNAL MEDICINE

## 2022-01-06 PROCEDURE — 85610 PROTHROMBIN TIME: CPT | Performed by: INTERNAL MEDICINE

## 2022-01-06 PROCEDURE — 87340 HEPATITIS B SURFACE AG IA: CPT | Performed by: INTERNAL MEDICINE

## 2022-01-06 PROCEDURE — 85027 COMPLETE CBC AUTOMATED: CPT | Performed by: INTERNAL MEDICINE

## 2022-01-06 PROCEDURE — 90935 HEMODIALYSIS ONE EVALUATION: CPT | Performed by: INTERNAL MEDICINE

## 2022-01-06 PROCEDURE — P9040 RBC LEUKOREDUCED IRRADIATED: HCPCS

## 2022-01-06 PROCEDURE — 80048 BASIC METABOLIC PNL TOTAL CA: CPT | Performed by: INTERNAL MEDICINE

## 2022-01-06 PROCEDURE — 99232 SBSQ HOSP IP/OBS MODERATE 35: CPT | Performed by: INTERNAL MEDICINE

## 2022-01-06 PROCEDURE — 86923 COMPATIBILITY TEST ELECTRIC: CPT

## 2022-01-06 PROCEDURE — 97530 THERAPEUTIC ACTIVITIES: CPT

## 2022-01-06 PROCEDURE — 97535 SELF CARE MNGMENT TRAINING: CPT

## 2022-01-06 PROCEDURE — 82948 REAGENT STRIP/BLOOD GLUCOSE: CPT

## 2022-01-06 PROCEDURE — NC001 PR NO CHARGE: Performed by: PODIATRIST

## 2022-01-06 PROCEDURE — 86850 RBC ANTIBODY SCREEN: CPT | Performed by: INTERNAL MEDICINE

## 2022-01-06 RX ORDER — METOCLOPRAMIDE 10 MG/1
10 TABLET ORAL EVERY 6 HOURS PRN
Status: DISCONTINUED | OUTPATIENT
Start: 2022-01-06 | End: 2022-01-12 | Stop reason: HOSPADM

## 2022-01-06 RX ORDER — ONDANSETRON 4 MG/1
4 TABLET, ORALLY DISINTEGRATING ORAL EVERY 6 HOURS PRN
Status: DISCONTINUED | OUTPATIENT
Start: 2022-01-06 | End: 2022-01-12 | Stop reason: HOSPADM

## 2022-01-06 RX ADMIN — INSULIN GLARGINE 20 UNITS: 100 INJECTION, SOLUTION SUBCUTANEOUS at 21:19

## 2022-01-06 RX ADMIN — SERTRALINE HYDROCHLORIDE 75 MG: 50 TABLET ORAL at 13:42

## 2022-01-06 RX ADMIN — OXYCODONE HYDROCHLORIDE 12.5 MG: 5 TABLET ORAL at 06:56

## 2022-01-06 RX ADMIN — SEVELAMER HYDROCHLORIDE 1600 MG: 800 TABLET, FILM COATED PARENTERAL at 13:42

## 2022-01-06 RX ADMIN — GABAPENTIN 100 MG: 100 CAPSULE ORAL at 21:19

## 2022-01-06 RX ADMIN — CINACALCET 30 MG: 30 TABLET, FILM COATED ORAL at 18:10

## 2022-01-06 RX ADMIN — ALPRAZOLAM 0.25 MG: 0.25 TABLET ORAL at 06:56

## 2022-01-06 RX ADMIN — DICLOFENAC SODIUM 2 G: 10 GEL TOPICAL at 13:45

## 2022-01-06 RX ADMIN — DICLOFENAC SODIUM 2 G: 10 GEL TOPICAL at 18:09

## 2022-01-06 RX ADMIN — BACITRACIN ZINC, NEOMYCIN, POLYMYXIN B 1 SMALL APPLICATION: 400; 3.5; 5 OINTMENT TOPICAL at 18:07

## 2022-01-06 RX ADMIN — NYSTATIN: 100000 POWDER TOPICAL at 18:14

## 2022-01-06 RX ADMIN — GABAPENTIN 100 MG: 100 CAPSULE ORAL at 18:12

## 2022-01-06 RX ADMIN — WARFARIN SODIUM 9 MG: 7.5 TABLET ORAL at 18:07

## 2022-01-06 RX ADMIN — CARVEDILOL 25 MG: 25 TABLET, FILM COATED ORAL at 18:08

## 2022-01-06 RX ADMIN — NIFEDIPINE 30 MG: 30 TABLET, FILM COATED, EXTENDED RELEASE ORAL at 13:45

## 2022-01-06 RX ADMIN — HEPARIN SODIUM 5000 UNITS: 5000 INJECTION INTRAVENOUS; SUBCUTANEOUS at 21:18

## 2022-01-06 RX ADMIN — PANTOPRAZOLE SODIUM 40 MG: 40 TABLET, DELAYED RELEASE ORAL at 05:59

## 2022-01-06 RX ADMIN — OXYCODONE HYDROCHLORIDE 7.5 MG: 5 TABLET ORAL at 21:20

## 2022-01-06 RX ADMIN — OXYCODONE HYDROCHLORIDE 7.5 MG: 5 TABLET ORAL at 13:44

## 2022-01-06 RX ADMIN — Medication 3 MG: at 21:19

## 2022-01-06 RX ADMIN — TORSEMIDE 100 MG: 20 TABLET ORAL at 13:42

## 2022-01-06 RX ADMIN — ALPRAZOLAM 0.25 MG: 0.25 TABLET ORAL at 18:07

## 2022-01-06 RX ADMIN — ONDANSETRON 4 MG: 4 TABLET, ORALLY DISINTEGRATING ORAL at 19:49

## 2022-01-06 RX ADMIN — SEVELAMER HYDROCHLORIDE 1600 MG: 800 TABLET, FILM COATED PARENTERAL at 18:08

## 2022-01-06 RX ADMIN — DICLOFENAC SODIUM 2 G: 10 GEL TOPICAL at 21:19

## 2022-01-06 NOTE — ASSESSMENT & PLAN NOTE
Monitor hemoglobin, has been stable around 7-8 for the past several days    -hemoglobin 7 this morning will transfuse 1 unit and recheck prior to discharge/placement

## 2022-01-06 NOTE — ASSESSMENT & PLAN NOTE
Routine hemodialysis per nephrology  Continue Renagel/Sensipar supplementation    1/5/22: Will plan for 1 unit of packed red blood cell transfusion during dialysis tomorrow on 01/06/2022, will then check CBC and if hemoglobin is above 7 will be stable to discharge to rehab [facility] were her dialysis is set up with [facility]    1/6/22:   Will transfuse 1 unit packed red blood cells after dialysis today pending discharge within 24 hours

## 2022-01-06 NOTE — PLAN OF CARE
Problem: OCCUPATIONAL THERAPY ADULT  Goal: Performs self-care activities at highest level of function for planned discharge setting  See evaluation for individualized goals  Description: Treatment Interventions: ADL retraining,Functional transfer training,Endurance training,Patient/family training,Compensatory technique education,Continued evaluation,Energy conservation,Activityengagement          See flowsheet documentation for full assessment, interventions and recommendations  Outcome: Progressing  Note: Limitation: Decreased ADL status,Decreased Safe judgement during ADL,Decreased endurance,Decreased self-care trans,Decreased high-level ADLs  Prognosis: Fair  Assessment: Patient participated in Skilled OT session this date with interventions consisting of ADL re training with the use of correct body mechnaics, safety awareness and fall prevention techniques,  therapeutic activities to: increase activity tolerance and increase OOB/ sitting tolerance   Upon arrival patient was found supine in bed  Pt demonstrated the following tasks: MI sup <> sit, MIN A STS and SPT/attempted 1-2 hops  Pt requires frequent cueing to maintain NWB of LLE - pt reports difficulty with c/o 2* fear of falling  Pt performs UBD independently, S for sock management, and MIN A for pant management  Pt with decreased dynamic standing balance  Patient continues to be functioning below baseline level, occupational performance remains limited secondary to factors listed above and increased risk for falls and injury  From OT standpoint, recommendation at time of d/c would be STR  Patient to benefit from continued Occupational Therapy treatment while in the hospital to address deficits as defined above and maximize level of functional independence with ADLs and functional mobility  Pt was left after session with all current needs met   The patient's raw score on the AM-PAC Daily Activity inpatient short form is 19, standardized score is 40 22, greater than 39 4  Patients at this level are likely to benefit from discharge to home  Please refer to the recommendation of the Occupational Therapist for safe discharge planning       OT Discharge Recommendation: Post acute rehabilitation services  OT - OK to Discharge: Yes (to rehab when medically stable )

## 2022-01-06 NOTE — CASE MANAGEMENT
Case Management Discharge Planning Note    Patient name Stanislav Current  [facility] MRN [id number]  : 1965 Date 2022       Current Admission Date: 2021  Current Admission Diagnosis:Right foot ulceration with osteomyelitis   Patient Active Problem List    Diagnosis Date Noted    Right foot ulceration with osteomyelitis 2021    Pruritus 2021    Postop check 2021    Sigmoid diverticulitis 2021    Pneumonia 2021    Chronic anticoagulation 2021    Essential hypertension 2021    Arteriovenous fistula for hemodialysis in place, primary [facility]) 2021    Healthcare-associated pneumonia 2021    Right foot pain 2021    [facility] fistula ([facility] ) 2021    Chronic pain syndrome 2021    Bilateral primary osteoarthritis of knee 2021    Bilateral patellofemoral syndrome 2021    Tinea unguium 2020    S/P foot surgery, right 2020    History of claustrophobia 2020    Morbid obesity 2020    Hyperlipidemia 2020    Diabetic polyneuropathy associated with type 2 diabetes mellitus ([facility] ) 2020    [facility]    Corns and callosities 2020    History of amputation of lesser toe of right foot ([facility] ) 2020    Flu-like symptoms 2020    Lumbar radiculopathy 2020    Low back pain with sciatica 2020    Hemodialysis-associated hypotension 2019    Hyponatremia 2019    Parenchymal renal hypertension 2019    Candidiasis of breast 2019    Anemia of chronic disease 2019    Disruption of internal surgical wound 2019    Dyspnea 2019    Secondary hyperparathyroidism of renal origin ([facility] ) 2019    Nausea and vomiting 2019    Meningioma ([facility] ) 2019    Concussion without loss of consciousness 03/15/2019    Colon cancer screening 2019    Gastroesophageal reflux disease 03/05/2019    Primary osteoarthritis of right knee 10/25/2018    Hemodialysis status ([facility] ) 10/23/2018    Knee pain 10/22/2018    Ambulatory dysfunction 10/22/2018    Anxiety disorder 04/06/2017    Acquired hypothyroidism 07/22/2016    Glaucoma 07/01/2016    ESRD on hemodialysis 07/01/2016    Abnormal uterine bleeding 02/15/2016    History of venous thromboembolism 02/14/2016    Pulmonary embolus ([facility] ) 10/30/2015    Cervical dysplasia 05/03/2015    Chronic endometritis 10/17/2014    Tinea pedis 10/02/2014    Benign neoplasm of skin 09/25/2014    Insulin-dependent diabetes mellitus with neuropathy 09/04/2014    Phlebitis and thrombophlebitis of superficial vessels of lower extremities 04/10/2014    Limb pain 11/25/2013    Mononeuritis of upper limb, unspecified laterality 11/25/2013    Legal blindness, as defined in [place] [facility] 40/70/1552    Complication from renal dialysis device 04/22/2013    Essential hypertension 10/15/2012    Cardiomy[facility] ) 09/26/2012    Esophageal reflux 08/20/2012    Allergic rhinitis 08/20/2012      LOS (days): 17  Geometric Mean LOS (GMLOS) (days): 5 80  Days to GMLOS:-11     OBJECTIVE:  Risk of Unplanned Readmission Score: 65         Current admission status: Inpatient   Preferred Pharmacy:   [facility]/pharmacy #[id number] Estrada - [address] [facility]  [address] [place] [address]  Phone: [phone] Fax: [phone] [facility], [address]  [address] [place] [address]  Phone: [phone] Fax: [phone]    Primary Care Provider: Pk Mondragon MD    Primary Insurance: MEDICARE  Secondary Insurance: [facility] [place]    DISCHARGE DETAILS:     Additional Comments: CM sent updated PT/OT notes to [facility] for review  CM will follow

## 2022-01-06 NOTE — ASSESSMENT & PLAN NOTE
Warfarin for anticoagulation  Goal INR 2-3, INR 1 9 this morning, will continue 9 mg for now and adjust if needed  -patient will likely need 1 day of 12 mg warfarin each week with 9 mg otherwise

## 2022-01-06 NOTE — PLAN OF CARE
Post-Dialysis RN Treatment Note    Blood Pressure:  Pre 191/78 mm/Hg  Post 145/59 mmHg   EDW  121 kg    Weight:  Pre 132 3 kg   Post 129 5 kg   Mode of weight measurement: Other chair scale   Volume Removed  3000 ml    Treatment duration 180 minutes    NS given  No    Treatment shortened? No   Medications given during Rx    Estimated Kt/V  0 92   Access type: AV fistula   Access Issues: No    Report called to primary nurse   Yes Linda Flores RN    Dr Meme Amezquita aware patient hgb 7 today, 1 unit PRBC's given during dialysis per Dr Meme Amezquita  Plan for 3 hour HD treatment with 3 kg fluid removal as tolerated      Problem: METABOLIC, FLUID AND ELECTROLYTES - ADULT  Goal: Electrolytes maintained within normal limits  Description: INTERVENTIONS:  - Monitor labs and assess patient for signs and symptoms of electrolyte imbalances  - Administer electrolyte replacement as ordered  - Monitor response to electrolyte replacements, including repeat lab results as appropriate  - Instruct patient on fluid and nutrition as appropriate  Outcome: Progressing  Goal: Fluid balance maintained  Description: INTERVENTIONS:  - Monitor labs   - Monitor I/O and WT  - Instruct patient on fluid and nutrition as appropriate  - Assess for signs & symptoms of volume excess or deficit  Outcome: Progressing

## 2022-01-06 NOTE — PLAN OF CARE
Problem: Potential for Falls  Goal: Patient will remain free of falls  Description: INTERVENTIONS:  - Educate patient/family on patient safety including physical limitations  - Instruct patient to call for assistance with activity   - Consult OT/PT to assist with strengthening/mobility   - Keep Call bell within reach  - Keep bed low and locked with side rails adjusted as appropriate  - Keep care items and personal belongings within reach  - Initiate and maintain comfort rounds  - Make Fall Risk Sign visible to staff  - Offer Toileting every 2 Hours, in advance of need  - Initiate/Maintain alarm  - Obtain necessary fall risk management equipment:   - Apply yellow socks and bracelet for high fall risk patients  - Consider moving patient to room near nurses station  Outcome: Progressing     Problem: METABOLIC, FLUID AND ELECTROLYTES - ADULT  Goal: Electrolytes maintained within normal limits  Description: INTERVENTIONS:  - Monitor labs and assess patient for signs and symptoms of electrolyte imbalances  - Administer electrolyte replacement as ordered  - Monitor response to electrolyte replacements, including repeat lab results as appropriate  - Instruct patient on fluid and nutrition as appropriate  Outcome: Progressing  Goal: Fluid balance maintained  Description: INTERVENTIONS:  - Monitor labs   - Monitor I/O and WT  - Instruct patient on fluid and nutrition as appropriate  - Assess for signs & symptoms of volume excess or deficit  Outcome: Progressing     Problem: PAIN - ADULT  Goal: Verbalizes/displays adequate comfort level or baseline comfort level  Description: Interventions:  - Encourage patient to monitor pain and request assistance  - Assess pain using appropriate pain scale  - Administer analgesics based on type and severity of pain and evaluate response  - Implement non-pharmacological measures as appropriate and evaluate response  - Consider cultural and social influences on pain and pain management  - Notify physician/advanced practitioner if interventions unsuccessful or patient reports new pain  Outcome: Progressing     Problem: INFECTION - ADULT  Goal: Absence or prevention of progression during hospitalization  Description: INTERVENTIONS:  - Assess and monitor for signs and symptoms of infection  - Monitor lab/diagnostic results  - Monitor all insertion sites, i e  indwelling lines, tubes, and drains  - Monitor endotracheal if appropriate and nasal secretions for changes in amount and color  - [place] appropriate cooling/warming therapies per order  - Administer medications as ordered  - Instruct and encourage patient and family to use good hand hygiene technique  - Identify and instruct in appropriate isolation precautions for identified infection/condition  Outcome: Progressing  Goal: Absence of fever/infection during neutropenic period  Description: INTERVENTIONS:  - Monitor WBC    Outcome: Progressing     Problem: SAFETY ADULT  Goal: Patient will remain free of falls  Description: INTERVENTIONS:  - Educate patient/family on patient safety including physical limitations  - Instruct patient to call for assistance with activity   - Consult OT/PT to assist with strengthening/mobility   - Keep Call bell within reach  - Keep bed low and locked with side rails adjusted as appropriate  - Keep care items and personal belongings within reach  - Initiate and maintain comfort rounds  - Make Fall Risk Sign visible to staff  - Offer Toileting every 2 Hours, in advance of need  - Initiate/Maintain alarm  - Obtain necessary fall risk management equipment:   - Apply yellow socks and bracelet for high fall risk patients  - Consider moving patient to room near nurses station  Outcome: Progressing  Goal: Maintain or return to baseline ADL function  Description: INTERVENTIONS:  -  Assess patient's ability to carry out ADLs; assess patient's baseline for ADL function and identify physical deficits which impact ability to perform ADLs (bathing, care of mouth/teeth, toileting, grooming, dressing, etc )  - Assess/evaluate cause of self-care deficits   - Assess range of motion  - Assess patient's mobility; develop plan if impaired  - Assess patient's need for assistive devices and provide as appropriate  - Encourage maximum independence but intervene and supervise when necessary  - Involve family in performance of ADLs  - Assess for home care needs following discharge   - Consider OT consult to assist with ADL evaluation and planning for discharge  - Provide patient education as appropriate  Outcome: Progressing  Goal: Maintains/Returns to pre admission functional level  Description: INTERVENTIONS:  - Perform BMAT or MOVE assessment daily    - Set and communicate daily mobility goal to care team and patient/family/caregiver  - Collaborate with rehabilitation services on mobility goals if consulted  - Perform Range of Motion 3 times a day  - Reposition patient every 3 hours    - Dangle patient 3 times a day  - Stand patient 3 times a day  - Ambulate patient 3 times a day  - Out of bed to chair 3 times a day   - Out of bed for meals 3 times a day  - Out of bed for toileting  - Record patient progress and toleration of activity level   Outcome: Progressing     Problem: DISCHARGE PLANNING  Goal: Discharge to home or other facility with appropriate resources  Description: INTERVENTIONS:  - Identify barriers to discharge w/patient and caregiver  - Arrange for needed discharge resources and transportation as appropriate  - Identify discharge learning needs (meds, wound care, etc )  - Arrange for interpretive services to assist at discharge as needed  - Refer to Case Management Department for coordinating discharge planning if the patient needs post-hospital services based on physician/advanced practitioner order or complex needs related to functional status, cognitive ability, or social support system  Outcome: Progressing     Problem: Knowledge Deficit  Goal: Patient/family/caregiver demonstrates understanding of disease process, treatment plan, medications, and discharge instructions  Description: Complete learning assessment and assess knowledge base  Interventions:  - Provide teaching at level of understanding  - Provide teaching via preferred learning methods  Outcome: Progressing     Problem: MOBILITY - ADULT  Goal: Maintain or return to baseline ADL function  Description: INTERVENTIONS:  -  Assess patient's ability to carry out ADLs; assess patient's baseline for ADL function and identify physical deficits which impact ability to perform ADLs (bathing, care of mouth/teeth, toileting, grooming, dressing, etc )  - Assess/evaluate cause of self-care deficits   - Assess range of motion  - Assess patient's mobility; develop plan if impaired  - Assess patient's need for assistive devices and provide as appropriate  - Encourage maximum independence but intervene and supervise when necessary  - Involve family in performance of ADLs  - Assess for home care needs following discharge   - Consider OT consult to assist with ADL evaluation and planning for discharge  - Provide patient education as appropriate  Outcome: Progressing  Goal: Maintains/Returns to pre admission functional level  Description: INTERVENTIONS:  - Perform BMAT or MOVE assessment daily    - Set and communicate daily mobility goal to care team and patient/family/caregiver  - Collaborate with rehabilitation services on mobility goals if consulted  - Perform Range of Motion 3 times a day  - Reposition patient every 3 hours    - Dangle patient 3 times a day  - Stand patient 3 times a day  - Ambulate patient 3 times a day  - Out of bed to chair 3 times a day   - Out of bed for meals 3 times a day  - Out of bed for toileting  - Record patient progress and toleration of activity level   Outcome: Progressing     Problem: Nutrition/Hydration-ADULT  Goal: Nutrient/Hydration intake appropriate for improving, restoring or maintaining nutritional needs  Description: Monitor and assess patient's nutrition/hydration status for malnutrition  Collaborate with interdisciplinary team and initiate plan and interventions as ordered  Monitor patient's weight and dietary intake as ordered or per policy  Utilize nutrition screening tool and intervene as necessary  Determine patient's food preferences and provide high-protein, high-caloric foods as appropriate       INTERVENTIONS:  - Monitor oral intake, urinary output, labs, and treatment plans  - Assess nutrition and hydration status and recommend course of action  - Evaluate amount of meals eaten  - Assist patient with eating if necessary   - Allow adequate time for meals  - Recommend/ encourage appropriate diets, oral nutritional supplements, and vitamin/mineral supplements  - Order, calculate, and assess calorie counts as needed  - Recommend, monitor, and adjust tube feedings and TPN/PPN based on assessed needs  - Assess need for intravenous fluids  - Provide specific nutrition/hydration education as appropriate  - Include patient/family/caregiver in decisions related to nutrition  Outcome: Progressing

## 2022-01-06 NOTE — PROGRESS NOTES
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- [place] 3 - POA              -s/p R TMA (DOS: 21)  2  T2DM  3  ESRD on HD  4  Obesity      PLAN:    · R TMA site stable with all sutures intact, despite patient continuing to walk on the foot against instructions  · Patient remains stable for d/c to rehab  · Continue local wound care, xeroform, DSD  Appreciate nursing assistance with dressing changes  · Elevation and offloading on green foam wedges or pillows when non-ambulatory  · Rest of care per primary team      Weightbearing status: NWB to right foot    SUBJECTIVE:     The patient was seen, evaluated, and assessed during her dialysis treatment today  The patient was awake, alert, and in no acute distress  No acute events overnight  The patient reports some residual tingling pain in the right foot - this is likely due to her pre-existing neuropathy or some component of phantom pain  She admits to continuing to walk on the right foot despite fully understanding this risks failure of the amputation site  Patient denies N/V/F/chills/SOB/CP  OBJECTIVE:     Vitals:   BP (!) 172/67   Pulse 68   Temp 97 9 °F (36 6 °C) (Oral)   Resp 16   Ht 5' 6" (1 676 m)   Wt 122 kg (268 lb 15 4 oz)   LMP 2016 (Exact Date)   SpO2 96%   BMI 43 41 kg/m²     Temp (24hrs), Av 2 °F (36 8 °C), Min:97 9 °F (36 6 °C), Max:98 6 °F (37 °C)      Physical Exam:     General: Alert, cooperative and no distress  Lungs: Non labored breathing  Abdomen: Soft, non-tender  Lower extremity exam:  Cardiovascular status at baseline  Neurological status at baseline  Musculoskeletal status at baseline, s/p R TMA  No calf tenderness noted      R TMA site stable with well coapted skin edges and all sutures intact  No erythema, no edema, no lymphangitis, no purulence from surgical site  Mild serosanguinous drainage noted on dressings  Early callus formation and new skin slough noted to R plantar 1st metatarsal stump region consistent with patient weight bearing on foot  Clinical Images 01/06/22:    R TMA medial      R TMA lateral      Additional Data:     Labs:    Results from last 7 days   Lab Units 01/05/22  0653   WBC Thousand/uL 6 49   HEMOGLOBIN g/dL 6 5*   HEMATOCRIT % 20 1*   PLATELETS Thousands/uL 170   NEUTROS PCT % 64   LYMPHS PCT % 18   MONOS PCT % 10   EOS PCT % 6     Results from last 7 days   Lab Units 01/05/22  0658   POTASSIUM mmol/L 4 7   CHLORIDE mmol/L 102   CO2 mmol/L 27   BUN mg/dL 57*   CREATININE mg/dL 5 44*   CALCIUM mg/dL 8 5     Results from last 7 days   Lab Units 01/04/22  0751   INR  2 11*       * I Have Reviewed All Lab Data Listed Above  Imaging: I have personally reviewed pertinent films in PACS  EKG, Pathology, and Other Studies: I have personally reviewed pertinent reports  ** Please Note: Portions of the record may have been created with voice recognition software  Occasional wrong word or "sound a like" substitutions may have occurred due to the inherent limitations of voice recognition software  Read the chart carefully and recognize, using context, where substitutions have occurred   **

## 2022-01-06 NOTE — OCCUPATIONAL THERAPY NOTE
Occupational Therapy Progress Note     Patient Name: Vinod Walton  DVVSH'R Date: 1/6/2022  Problem List  Principal Problem:    Right foot ulceration with osteomyelitis  Active Problems:    History of venous thromboembolism    ESRD on hemodialysis    Anemia of chronic disease    Morbid obesity    Hyperlipidemia    Insulin-dependent diabetes mellitus with neuropathy    Essential hypertension            01/06/22 1458   OT Last Visit   OT Visit Date 01/06/22   Note Type   Note Type Treatment for insurance authorization   Restrictions/Precautions   Weight Bearing Precautions Per Order Yes   RLE Weight Bearing Per Order NWB   Other Precautions Fall Risk;Pain   General   Response to Previous Treatment Patient reporting fatigue but able to participate   Lifestyle   Autonomy PTA, pt reports being I with ADLs/IADLs, fnxl mobility, (-)    Reciprocal Relationships Pt lives w her s/o, niece, and her children who are able to assist    Service to Others 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     Intrinsic Gratification Pt enjoys spending time w her son    Pain Assessment   Pain Assessment Tool 0-10   Pain Score 8   Pain Location/Orientation Orientation: Left; Location: Leg   Hospital Pain Intervention(s) Repositioned; Ambulation/increased activity   ADL   Where Assessed Edge of bed   UB Dressing Assistance 7  Independent   UB Dressing Comments Dons and doffs front-zip sweatshirt    LB Dressing Assistance 4  Minimal Assistance   LB Dressing Comments Pt dons B/L socks with S, MIN A  for pants mgmt  Pt requires A to maintain stance for pulling off/on hips, 1 near LOB    Bed Mobility   Supine to Sit 6  Modified independent   Additional items HOB elevated; Increased time required   Sit to Supine 6  Modified independent   Additional items HOB elevated; Increased time required   Transfers   Sit to Stand 4  Minimal assistance   Additional items Assist x 1;Verbal cues   Stand to Sit 4  Minimal assistance Additional items Assist x 1;Verbal cues   Stand pivot 4  Minimal assistance   Additional items Assist x 1; Increased time required   Additional Comments Transfers with RW, cues for NWB   Functional Mobility   Functional Mobility 4  Minimal assistance   Additional Comments 1-2 minimal hops    Cognition   Overall Cognitive Status WFL   Arousal/Participation Alert; Responsive; Cooperative   Attention Attends with cues to redirect   Orientation Level Oriented X4   Memory Decreased recall of precautions   Following Commands Follows one step commands without difficulty   Comments Pt pleasant and cooperative t/o session    Activity Tolerance   Activity Tolerance Patient limited by fatigue   Medical Staff Made Aware CM    Assessment   Assessment Patient participated in Skilled OT session this date with interventions consisting of ADL re training with the use of correct body mechnaics, safety awareness and fall prevention techniques,  therapeutic activities to: increase activity tolerance and increase OOB/ sitting tolerance   Upon arrival patient was found supine in bed  Pt demonstrated the following tasks: MI sup <> sit, MIN A STS and SPT/attempted 1-2 hops  Pt requires frequent cueing to maintain NWB of LLE - pt reports difficulty with c/o 2* fear of falling  Pt performs UBD independently, S for sock management, and MIN A for pant management  Pt with decreased dynamic standing balance  Patient continues to be functioning below baseline level, occupational performance remains limited secondary to factors listed above and increased risk for falls and injury  From OT standpoint, recommendation at time of d/c would be STR  Patient to benefit from continued Occupational Therapy treatment while in the hospital to address deficits as defined above and maximize level of functional independence with ADLs and functional mobility  Pt was left after session with all current needs met   The patient's raw score on the AM-PAC Daily Activity inpatient short form is 19, standardized score is 40 22, greater than 39 4  Patients at this level are likely to benefit from discharge to home  Please refer to the recommendation of the Occupational Therapist for safe discharge planning  Plan   Treatment Interventions ADL retraining;Functional transfer training;UE strengthening/ROM; Endurance training;Patient/family training;Equipment evaluation/education; Compensatory technique education;Continued evaluation; Activityengagement; Energy conservation   Goal Expiration Date 01/10/22   OT Treatment Day 2   OT Frequency 3-5x/wk   Recommendation   OT Discharge Recommendation Post acute rehabilitation services   OT - OK to Discharge Yes  (to rehab when medically stable )   AM-PAC Daily Activity Inpatient   Lower Body Dressing 3   Bathing 2   Toileting 2   Upper Body Dressing 4   Grooming 4   Eating 4   Daily Activity Raw Score 19   Daily Activity Standardized Score (Calc for Raw Score >=11) 40 22   AM-PAC Applied Cognition Inpatient   Following a Speech/Presentation 3   Understanding Ordinary Conversation 4   Taking Medications 4   Remembering Where Things Are Placed or Put Away 4   Remembering List of 4-5 Errands 3   Taking Care of Complicated Tasks 3   Applied Cognition Raw Score 21   Applied Cognition Standardized Score 44 3     Filomena Chowdary MS, OTR/L

## 2022-01-06 NOTE — PROGRESS NOTES
Pradeep [facility] PROGRESS NOTE   Vinod Walton 49 y o  female MRN: [id number]  Unit/Bed#: [facility] 933-01 Encounter: [id number]  Reason for Consult: ESRD    ASSESSMENT and PLAN:    60-year-old female with a past medical history of ESRD, hypertension, diabetes, GERD, obesity, DVT who initially presents on 12/21 with lower extremity foot wound   Nephrology is consulted for ESRD  Leslye Lamber is concern for osteomyelitis     1) ESRD     -outpatient unit is [address]  -access left upper extremity AV fistula  -patient had dialysis last on Sunday due to holiday schedule     Plan  -patient seen on dialysis this morning  Blood pressures stable  Sodium 138  Bicarbonate 35  F 180  Blood flow rate 400  3 5 hour  2 5-3 L ultrafiltration as tolerates  -transfusion plan for today during dialysis per primary team  -check CBC this evening  Ordered  -reviewed with case management team   Patient is still pending acceptance to [facility] from rehab standpoint     2) volume     -ultrafiltrate with dialysis as tolerates     3) osteomyelitis     -per primary team  -status post right TMA  -patient being encouraged by Podiatry team to avoid weight-bearing as the patient is not compliant with this as often as should be 3     4) anemia     -no MITCHELL due to history of PE  -not a candidate for IV iron as last ferritin was above 1300  -will recheck iron levels as outpatient  -transfusion 1/6 is planned     5) hypertension     -monitor with current regimen     6) MBD-on phosphorus binders     -on Sensipar also    7) electrolytes-stable sodium and potassium    8) acid/base-stable bicarbonate       SUBJECTIVE / 24H INTERVAL HISTORY:    Blood pressure is 024-170 systolic  Patient seen on dialysis  Denies complaints      OBJECTIVE:  Current Weight: Weight - Scale: 122 kg (268 lb 15 4 oz)  Vitals:    01/06/22 0900 01/06/22 0930 01/06/22 0942 01/06/22 1000   BP: 135/50 (!) 119/47 131/56 (!) 152/48   BP Location:       Pulse: 62 62 65 60 Resp: 16 16 16 16   Temp:   (!) 97 4 °F (36 3 °C) (!) 97 3 °F (36 3 °C)   TempSrc:   Oral Oral   SpO2:       Weight:       Height:           Intake/Output Summary (Last 24 hours) at 1/6/2022 1007  Last data filed at 1/6/2022 0740  Gross per 24 hour   Intake 200 ml   Output 0 ml   Net 200 ml     General: NAD  Skin: no rash  Eyes: anicteric sclera  Neck: supple  Chest: CTA b/l, no ronchii, no wheeze, no rubs, no rales, limited exam from the anterior  CVS: s1s2, no murmur, no gallop, no rub  Abdomen: soft, nontender, nl sounds  Extremities:  One to 2+ edema LE b/l  : no hernandez  Neuro: AAOX3  Psych: normal affect    Medications:    Current Facility-Administered Medications:     acetaminophen (TYLENOL) tablet 650 mg, 650 mg, Oral, Q6H PRN, Anoop Spatz, DPM, 650 mg at 12/28/21 1721    albuterol (PROVENTIL HFA,VENTOLIN HFA) inhaler 2 puff, 2 puff, Inhalation, Q6H PRN, Anoop Spatz, DPM    ALPRAZolam Ardelle Fritter) tablet 0 25 mg, 0 25 mg, Oral, 4x Daily PRN, Anoop Spatz, DPM, 0 25 mg at 01/06/22 0656    ammonium lactate (LAC-HYDRIN) 12 % cream, , Topical, BID PRN, Anoop Spatz, DPM, Given at 12/21/21 2207    calcium carbonate (TUMS) chewable tablet 500 mg, 500 mg, Oral, TID PRN, Mary Whittington MD, 500 mg at 12/30/21 2343    carvedilol (COREG) tablet 25 mg, 25 mg, Oral, BID With Meals, Anoop Spatz, DPM, 25 mg at 01/05/22 1717    cinacalcet (SENSIPAR) tablet 30 mg, 30 mg, Oral, Daily, Anoop Spaangel, DPM, 30 mg at 01/05/22 1717    Diclofenac Sodium (VOLTAREN) 1 % topical gel 2 g, 2 g, Topical, 4x Daily, Mary Whittington MD, 2 g at 01/05/22 1709    fentaNYL (SUBLIMAZE) injection 25 mcg, 25 mcg, Intravenous, Q5 Min PRN, Maureen Brown CRNA    gabapentin (NEURONTIN) capsule 100 mg, 100 mg, Oral, TID, Iverson Spatz, DPM, 100 mg at 01/05/22 2124    heparin (porcine) subcutaneous injection 5,000 Units, 5,000 Units, Subcutaneous, Q12H [facility], Iverson Spatz, DPM, 5,000 Units at 01/05/22 2124    hydrALAZINE (APRESOLINE) injection 5 mg, 5 mg, Intravenous, Q6H PRN, Jen March, DPM, 5 mg at 12/21/21 0047    insulin glargine (LANTUS) subcutaneous injection 20 Units 0 2 mL, 20 Units, Subcutaneous, HS, Jen March, DPM, 20 Units at 01/05/22 2124    insulin lispro (HumaLOG) 100 units/mL subcutaneous injection 1-5 Units, 1-5 Units, Subcutaneous, TID AC, 1 Units at 01/04/22 1843 **AND** Fingerstick Glucose (POCT), , , TID AC, Jen March, DPM    insulin lispro (HumaLOG) 100 units/mL subcutaneous injection 3 Units, 3 Units, Subcutaneous, TID With Meals, Precious March, DPM, 3 Units at 01/05/22 1719    levothyroxine tablet 50 mcg, 50 mcg, Oral, Daily, Jenious March, DPM, 50 mcg at 01/05/22 0934    lidocaine (LIDODERM) 5 % patch 1 patch, 1 patch, Topical, Daily, Fan Chauhan MD, 1 patch at 01/05/22 0933    melatonin tablet 3 mg, 3 mg, Oral, HS, Jenious March, DPM, 3 mg at 01/05/22 2124    metoclopramide (REGLAN) injection 10 mg, 10 mg, Intravenous, Q6H PRN, Melita Beckwith PA-C    neomycin-bacitracin-polymyxin b (NEOSPORIN) ointment 1 small application, 1 small application, Topical, BID, Ferdinand Cardona DO, 1 small application at 52/89/81 1719    NIFEdipine (PROCARDIA XL) 24 hr tablet 30 mg, 30 mg, Oral, Daily, Jen March, DPM, 30 mg at 01/04/22 1505    nystatin (MYCOSTATIN) powder, , Topical, BID, Jen March, DPM, Given at 01/05/22 1720    ondansetron [facility]) injection 4 mg, 4 mg, Intravenous, Once PRN, Christy Ferguson CRNA    oxyCODONE (ROXICODONE) IR tablet 7 5 mg, 7 5 mg, Oral, Q4H PRN, 7 5 mg at 01/05/22 1940 **OR** oxyCODONE (ROXICODONE) IR tablet 12 5 mg, 12 5 mg, Oral, Q4H PRN, Ferdinand Cardona DO, 12 5 mg at 01/06/22 0656    pantoprazole (PROTONIX) EC tablet 40 mg, 40 mg, Oral, Early Morning, Jen March, DPM, 40 mg at 01/06/22 0559    polyethylene glycol (MIRALAX) packet 17 g, 17 g, Oral, Daily PRN, Jen March, DPM    senna (SENOKOT) tablet 17 2 mg, 2 tablet, Oral, HS PRN, Jen March, DPM    sertraline (ZOLOFT) tablet 75 mg, 75 mg, Oral, Daily, [place] Mage, DPM, 75 mg at 01/05/22 5737    sevelamer (RENAGEL) tablet 1,600 mg, 1,600 mg, Oral, TID With Meals, Flor Mage, DPM, 1,600 mg at 01/05/22 1716    torsemide [facility]) tablet 100 mg, 100 mg, Oral, Daily, [place] Mage, DPM, 100 mg at 01/05/22 8900    warfarin (COUMADIN) tablet 9 mg, 9 mg, Oral, Once per day on Mon Tue Wed Thu Fri Sat, Alanis Pimentel, DO, 9 mg at 01/05/22 1717    Laboratory Results:  Results from last 7 days   Lab Units 01/06/22  0759 01/05/22  0658 01/05/22  0653 01/04/22  0751 01/02/22  0803 01/01/22  1409 12/31/21  0545   WBC Thousand/uL 7 45  --  6 49 7 53 7 68 6 80 6 85   HEMOGLOBIN g/dL 7 0*  --  6 5* 7 1* 7 0* 7 4* 7 3*   HEMATOCRIT % 21 8*  --  20 1* 21 5* 21 1* 23 1* 23 1*   PLATELETS Thousands/uL 199  --  170 204 193 196 195   POTASSIUM mmol/L 4 9 4 7  --  5 5*  --   --  4 8   CHLORIDE mmol/L 100 102  --  98*  --   --  103   CO2 mmol/L 25 27  --  25  --   --  28   BUN mg/dL 72* 57*  --  77*  --   --  50*   CREATININE mg/dL 7 29* 5 44*  --  7 07*  --   --  5 72*   CALCIUM mg/dL 8 7 8 5  --  8 6  --   --  9 1

## 2022-01-06 NOTE — PHYSICAL THERAPY NOTE
Physical Therapy Treatment Session    Patient's Name: Bonnie Rivera    Admitting Diagnosis  Foot pain [D37 [id number]]  Foot ulcer ([facility] ) [L28 [id number]]    Problem List  Patient Active Problem List   Diagnosis    Essential hypertension    History of venous thromboembolism    Abnormal uterine bleeding    Glaucoma    ESRD on hemodialysis    Anxiety disorder    Knee pain    Ambulatory dysfunction    Hemodialysis status ([facility] )    Primary osteoarthritis of right knee    Esophageal reflux    Colon cancer screening    Gastroesophageal reflux disease    Meningioma (HCC)    Nausea and vomiting    Secondary hyperparathyroidism of renal origin ([facility] )    Dyspnea    Anemia of chronic disease    Disruption of internal surgical wound    Hyponatremia    Parenchymal renal hypertension    Candidiasis of breast    Hemodialysis-associated hypotension    Lumbar radiculopathy    Low back pain with sciatica    Flu-like symptoms    Diabetic polyneuropathy associated with type 2 diabetes mellitus ([facility] )    Tinea pedis    Encounter for diabetic foot exam ([place] [id number] )    Corns and callosities    History of amputation of lesser toe of right foot ([facility] )    Morbid obesity    Hyperlipidemia    History of claustrophobia    Allergic rhinitis    Benign neoplasm of skin    Cardiomyopathy ([facility] )    Cervical dysplasia    Chronic endometritis    Complication from renal dialysis device    Concussion without loss of consciousness    Insulin-dependent diabetes mellitus with neuropathy    Acquired hypothyroidism    Legal blindness, as defined in [place] [facility]    Limb pain    Mononeuritis of upper limb, unspecified laterality    Phlebitis and thrombophlebitis of superficial vessels of lower extremities    Pulmonary embolus ([facility] )    S/P foot surgery, right    Tinea unguium    Bilateral primary osteoarthritis of knee    Bilateral patellofemoral syndrome    Chronic pain syndrome    A-V fistula (HCC)    Right foot pain    Arteriovenous fistula for hemodialysis in place, primary ([facility] )    Healthcare-associated pneumonia    Pneumonia    Chronic anticoagulation    Essential hypertension    Sigmoid diverticulitis    Postop check    Pruritus    Right foot ulceration with osteomyelitis       Past Medical History  Past Medical History:   Diagnosis Date    Abnormal uterine bleeding (AUB)     Anemia     Anxiety     Arthritis     Chronic kidney disease     Claustrophobia     Diabetes mellitus ([facility] )     Disease of thyroid gland     DVT (deep venous thrombosis) (HCC)     End stage kidney disease (HCC)     Foot ulcer due to secondary DM ([facility] )     Hemodialysis patient ([place] [address] )     Tues, Thurs, Sat    Hypertension     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 )     right eye    Morbid obesity ([facility]  )     Osteomyelitis of fifth toe of right foot ([facility]  )     Panic attacks     Pulmonary embolism (HCC)     Reflux esophagitis     Thrombophlebitis of saphenous vein     Warfarin anticoagulation        Past Surgical History  Past Surgical History:   Procedure Laterality Date    AMPUTATION      r 4th toe    ARTERIOVENOUS GRAFT PLACEMENT      CATARACT EXTRACTION Bilateral     CERVICAL BIOPSY  W/ LOOP ELECTRODE EXCISION       SECTION      DIALYSIS FISTULA CREATION      DILATION AND CURETTAGE OF UTERUS      ENDOMETRIAL ABLATION W/ NOVASURE      EYE SURGERY      HYSTERECTOMY      @ age 57 (complete)    IR AV FISTULAGRAM/GRAFTOGRAM  10/11/2019    IR AV FISTULAGRAM/GRAFTOGRAM  2020    IR AV FISTULAGRAM/GRAFTOGRAM  2020    IR NON-TUNNELED CENTRAL LINE PLACEMENT  2021    IR NON-TUNNELED CENTRAL LINE PLACEMENT  10/4/2021    OOPHORECTOMY Bilateral     @ age 57    PERICARDIUM SURGERY      WI AMPUTATION FOOT,TRANSMETATARSAL Right 2021    Procedure: AMPUTATION TRANSMETATARSAL (TMA);  Surgeon: Derrek Vieira DPM;  [facility];  Service: Podiatry    WI AMPUTATION TOE,MT-P JT Right 5/28/2020    Procedure: AMPUTATION TOE- 5th;  Surgeon: Shavon Ackerman DPM;  [facility];  Service: Podiatry    AR COLONOSCOPY FLX DX W/COLLJ [place] 1976 PFRMD N/A 3/13/2019    Procedure: COLONOSCOPY;  Surgeon: Serafin Sands MD;  Location: [facility]; Service: Gastroenterology    AR ESOPHAGOGASTRODUODENOSCOPY TRANSORAL DIAGNOSTIC N/A 3/13/2019    Procedure: ESOPHAGOGASTRODUODENOSCOPY (EGD); Surgeon: Serafin Sands MD;  Location: [facility]; Service: Gastroenterology    AR LAPAROSCOPY W TOT HYSTERECT UTERUS [address] OR LESS N/A 2/16/2016    Procedure: HYSTERECTOMY LAPAROSCOPIC TOTAL [facility]), with bilateral salpingectomy;  Surgeon: Kimberley Florentino DO;  [facility];  Service: Gynecology    THROMBECTOMY / ARTERIOVENOUS GRAFT REVISION      TOE SURGERY Right     removal of the 4th toe    WOUND DEBRIDEMENT Right 10/8/2021    Procedure: R 1st MTPJ washout ;  Surgeon: Mamie Kwan DPM;  [facility];  Service: Podiatry        01/06/22 1403   PT Last Visit   PT Visit Date 01/06/22   Note Type   Note Type Treatment   Pain Assessment   Pain Assessment Tool 0-10   Pain Score No Pain   Restrictions/Precautions   Weight Bearing Precautions Per Order Yes   RLE Weight Bearing Per Order NWB   Other Precautions Chair Alarm; Bed Alarm; Fall Risk;Pain;Contact/isolation  (+ NWB RLE)   General   Chart Reviewed Yes   Response to Previous Treatment Patient with no complaints from previous session  Family/Caregiver Present No   Cognition   Overall Cognitive Status WFL   Arousal/Participation Responsive; Cooperative   Attention Attends with cues to redirect   Orientation Level Oriented X4   Memory Decreased recall of precautions   Following Commands Follows one step commands without difficulty   Comments Pt pleasant and agreeable to treatment session   Bed Mobility   Additional Comments Pt greeted sitting at EOB and remained in recliner at end of session with all needs in reach      Transfers   Sit to Stand 4  Minimal assistance   Additional items Assist x 1; Increased time required;Verbal cues   Stand to Sit 4  Minimal assistance   Additional items Assist x 1; Increased time required;Verbal cues   Stand pivot 4  Minimal assistance   Additional items Assist x 1; Increased time required;Verbal cues   Additional Comments Transfers with Jules physically but requires max verbal cues to maintain NWB status on RLE  Other verbal cues required for safety and proper technique  Transfer with HHA this session   Ambulation/Elevation   Gait pattern Forward Flexion;Decreased foot clearance; Improper Weight shift; Short stride; Step to;Excessively slow   Gait Assistance 4  Minimal assist   Additional items Assist x 1;Verbal cues   Assistive Device Other (Comment)  (HHA)   Distance 3' w/ mini hops to maintain WBing   Stair Management Assistance 4  Minimal assist   Additional items Assist x 1;Verbal cues   Stair Management Technique Bumping;Backward   Number of Stairs 1   Ambulation/Elevation Additional Comments HHA with Jules for transfer but requiring max verbal cues for maintaining NWB status on RLE  Further cues for safety and proper technique   Balance   Static Sitting Fair +   Dynamic Sitting Fair   Static Standing Fair -   Dynamic Standing Poor +   Ambulatory Poor +   Endurance Deficit   Endurance Deficit Yes   Endurance Deficit Description weakness, fatigue   Activity Tolerance   Activity Tolerance Patient limited by fatigue   Medical Staff Made Aware yes   Nurse Made Aware ok to see per RN   Assessment   Prognosis Fair   Problem List Decreased strength;Decreased endurance; Impaired balance;Decreased mobility; Decreased safety awareness; Impaired judgement;Obesity;Orthopedic restrictions;Pain   Assessment Pt is a 50 yo female presenting for PT treatment session today 1/6/22 with weight-bearing restrictions (NWB RLE), decreased mobility, decreased strength and endurance, poor adherence to precautions, balance deficits, fall risk, fatigue   These impairments are currently limiting her from returning to PLOF  Pt was greeted seated at EOB with no complaints from prior session, agreeable to treatment this afternoon  Currently able to demonstrate functional transfers, ambulation, and stair navigation techniques with Jules x1 requiring max verbal cues for maintaining NWB status on RLE, use of HHA this session  Pt able to ambulate using hop-to method and perform stand pivot technique to sit on stairs  Pt educated on proper technique for backward bumping in seated position up stairs, able to verbalize understanding of technique  Then able to demonstrate returning to recliner chair from staircase with Jules and HHA x1 still with poor insight into current WB restrictions  Placed OOB in recliner chair following session with all needs in reach  The patient's AM-PAC Basic Mobility Inpatient Short Form Raw Score is 14  A Raw score of less than or equal to 16 suggests the patient may benefit from discharge to post-acute rehabilitation services  Please also refer to the recommendation of the Physical Therapist for safe discharge planning  Would benefit from continued skilled PT within the acute care setting to address current deficits to optimize function  PT recommends rehab at this time following d/c     Barriers to Discharge Inaccessible home environment   Goals   Patient Goals To go to rehab   Plan   Treatment/Interventions Functional transfer training;LE strengthening/ROM; Therapeutic exercise; Endurance training;Patient/family training;Equipment eval/education; Bed mobility;Gait training   Progress Progressing toward goals   PT Frequency 3-5x/wk   Recommendation   PT Discharge Recommendation Post acute rehabilitation services   AM-PAC Basic Mobility Inpatient   Turning in Bed Without Bedrails 3   Lying on Back to Sitting on Edge of Flat Bed 3   Moving Bed to Chair 2   Standing Up From Chair 2   Walk in Room 2   Climb 3-5 Stairs 2   Basic Mobility Inpatient Raw Score 14 Basic Mobility Standardized Score 35 55   Highest Level Of Mobility   -[facility] Goal 4: Move to chair/commode   End of Consult   Patient Position at End of Consult Bedside chair; All needs within reach           Georgia Vargas, SPT

## 2022-01-06 NOTE — PROGRESS NOTES
[address] [facility]  Progress Note Christina Moses 1965, 49 y o  female MRN: [id number]  Unit/Bed#: [facility] 933-01 Encounter: [id number]  Primary Care Provider: Rayray Peacock MD   Date and time admitted to hospital: 12/20/2021  3:07 PM    Essential hypertension  Assessment & Plan  Monitor blood pressures  Avoid hypotension    Insulin-dependent diabetes mellitus with neuropathy  Assessment & Plan  Lab Results   Component Value Date    HGBA1C 7 9 (H) 09/30/2021     Continue basal/prandial insulin w/ additional SSI coverage per Accu-Cheks  Carbohydrate restricted diet  Continue Gabapentin for neuropathy    Hyperlipidemia  Assessment & Plan  Continue Lipitor    Morbid obesity  Assessment & Plan  BMI of 43 41  Lifestyle/diet modifications    Anemia of chronic disease  Assessment & Plan  Monitor hemoglobin, has been stable around 7-8 for the past several days  -hemoglobin 7 this morning will transfuse 1 unit and recheck prior to discharge/placement    ESRD on hemodialysis  Assessment & Plan  Routine hemodialysis per nephrology  Continue Renagel/Sensipar supplementation    1/5/22: Will plan for 1 unit of packed red blood cell transfusion during dialysis tomorrow on 01/06/2022, will then check CBC and if hemoglobin is above 7 will be stable to discharge to rehab [facility] were her dialysis is set up with [facility]    1/6/22:   Will transfuse 1 unit packed red blood cells after dialysis today pending discharge within 24 hours    History of venous thromboembolism  Assessment & Plan  Warfarin for anticoagulation  Goal INR 2-3, INR 1 9 this morning, will continue 9 mg for now and adjust if needed  -patient will likely need 1 day of 12 mg warfarin each week with 9 mg otherwise    * Right foot ulceration with osteomyelitis  Assessment & Plan  Status post transmetatarsal amputation with Podiatry  Patient no monitor off antibiotics Infectious Disease input noted  Podiatry input noted patient is noncompliant with nonweightbearing status in this was discussed with the patient and strongly recommended nonweightbearing status for proper healing  Physical therapy  -pending placement              VTE Pharmacologic Prophylaxis:   High Risk (Score >/= 5) - Pharmacological DVT Prophylaxis Ordered: heparin  Sequential Compression Devices Ordered  Patient Centered Rounds: I performed bedside rounds with nursing staff today  Discussions with Specialists or Other Care Team Provider: n/a    Education and Discussions with Family / Patient: Updated  ( and significant other) at bedside  Time Spent for Care: 30 minutes  More than 50% of total time spent on counseling and coordination of care as described above  Current Length of Stay: 17 day(s)  Current Patient Status: Inpatient   Certification Statement: The patient will continue to require additional inpatient hospital stay due to Pending placement to rehab  Discharge Plan: Anticipate discharge tomorrow to rehab facility  Code Status: Level 1 - Full Code    Subjective:   Patient denies any acute complaints    Objective:     Vitals:   Temp (24hrs), Av 6 °F (36 4 °C), Min:97 3 °F (36 3 °C), Max:98 2 °F (36 8 °C)    Temp:  [97 3 °F (36 3 °C)-98 2 °F (36 8 °C)] 98 2 °F (36 8 °C)  HR:  [60-74] 70  Resp:  [16-18] 16  BP: (119-191)/(42-78) 119/68  Body mass index is 43 41 kg/m²  Input and Output Summary (last 24 hours): Intake/Output Summary (Last 24 hours) at 2022 1802  Last data filed at 2022 1325  Gross per 24 hour   Intake 750 ml   Output 3507 ml   Net -2757 ml       Physical Exam:   Physical Exam  Vitals and nursing note reviewed  Constitutional:       General: She is not in acute distress  Appearance: She is well-developed  She is obese  She is not ill-appearing, toxic-appearing or diaphoretic  HENT:      Head: Normocephalic and atraumatic  Eyes:      General: No scleral icterus       Conjunctiva/sclera: Conjunctivae normal    Cardiovascular:      Rate and Rhythm: Normal rate and regular rhythm  Heart sounds: No murmur heard  No friction rub  No gallop  Pulmonary:      Effort: Pulmonary effort is normal  No respiratory distress  Breath sounds: Normal breath sounds  No stridor  No wheezing, rhonchi or rales  Abdominal:      General: There is no distension  Palpations: Abdomen is soft  There is no mass  Tenderness: There is no abdominal tenderness  There is no guarding or rebound  Hernia: No hernia is present  Musculoskeletal:         General: No swelling, tenderness, deformity or signs of injury  Cervical back: Neck supple  Left lower leg: Edema present  Comments: Right foot dressing clean dry intact   Skin:     General: Skin is warm and dry  Coloration: Skin is not jaundiced or pale  Findings: No bruising, erythema, lesion or rash  Neurological:      Mental Status: She is alert and oriented to person, place, and time  Additional Data:     Labs:  Results from last 7 days   Lab Units 01/06/22  1649 01/06/22  0759 01/05/22  0653   WBC Thousand/uL 6 60   < > 6 49   HEMOGLOBIN g/dL 7 7*   < > 6 5*   HEMATOCRIT % 23 1*   < > 20 1*   PLATELETS Thousands/uL 143*   < > 170   NEUTROS PCT %  --   --  64   LYMPHS PCT %  --   --  18   MONOS PCT %  --   --  10   EOS PCT %  --   --  6    < > = values in this interval not displayed       Results from last 7 days   Lab Units 01/06/22  0759   SODIUM mmol/L 134*   POTASSIUM mmol/L 4 9   CHLORIDE mmol/L 100   CO2 mmol/L 25   BUN mg/dL 72*   CREATININE mg/dL 7 29*   ANION GAP mmol/L 9   CALCIUM mg/dL 8 7   GLUCOSE RANDOM mg/dL 132     Results from last 7 days   Lab Units 01/06/22  0759   INR  1 94*     Results from last 7 days   Lab Units 01/06/22  1656 01/06/22  1407 01/05/22  2111 01/05/22  1540 01/05/22  1150 01/04/22  2034 01/04/22  1649 01/03/22  2131 01/03/22  1119 01/03/22  0725 01/03/22  0424 01/02/22  2211 POC GLUCOSE mg/dl 146* 242* 188* 143* 116 126 181* 165* 147* 178* 164* 139               Lines/Drains:  Invasive Devices  Report    Line            Hemodialysis AV Fistula 02/20/18 Left Forearm 1416 days                      Imaging: No pertinent imaging reviewed      Recent Cultures (last 7 days):         Last 24 Hours Medication List:   Current Facility-Administered Medications   Medication Dose Route Frequency Provider Last Rate    acetaminophen  650 mg Oral Q6H PRN Robet Jewel, DPM      albuterol  2 puff Inhalation Q6H PRN Robet Jewel, DPM      ALPRAZolam  0 25 mg Oral 4x Daily PRN Robet Jewel, DPM      ammonium lactate   Topical BID PRN Robet Jewel, DPM      calcium carbonate  500 mg Oral TID PRN Jennifer Daily MD      carvedilol  25 mg Oral BID With Meals Robet Jewel, DPM      cinacalcet  30 mg Oral Daily [place], [place]      Diclofenac Sodium  2 g Topical 4x Daily Jennifer Daily MD      fentaNYL  25 mcg Intravenous Q5 Min PRN Roseanna Liu CRNA      gabapentin  100 mg Oral TID Robet Jewel, DPM      heparin (porcine)  5,000 Units Subcutaneous Q12H [address] [place], [place]      hydrALAZINE  5 mg Intravenous Q6H PRN Robet Jewel, DPM      insulin glargine  20 Units Subcutaneous HS [facility], D[facility]      insulin lispro  1-5 Units Subcutaneous TID [facility] Robet Jewel, DPM      insulin lispro  3 Units Subcutaneous TID With Meals Robet Jewel, DPM      levothyroxine  50 mcg Oral Daily [facility], DPM      lidocaine  1 patch Topical Daily Jennifer Daily MD      melatonin  3 mg Oral HS Robet Jewel, DPM      metoclopramide  10 mg Intravenous Q6H PRN Melita Beckwith PA-C      neomycin-bacitracin-polymyxin b  1 small application Topical BID Ferdinand Cardona DO      NIFEdipine  30 mg Oral Daily [place], [place]      nystatin   Topical BID [facility] Jew[facility], DP      ondansetron  4 mg Intravenous Once PRN Roseanna Liu CRNA      oxyCODONE  7 5 mg Oral Q4H PRN Angelo Quiroga DO      Or  oxyCODONE  12 5 mg Oral Q4H PRN Ferdinand Cardona DO      pantoprazole  40 mg Oral Early Morning Libia Rice DPM      polyethylene glycol  17 g Oral Daily PRN Libia Rice DPM      senna  2 tablet Oral HS PRN Libia Rice DPM      sertraline  75 mg Oral Daily Elvira Ritchie      sevelamer  1,600 mg Oral TID With Meals Libia Rice DPM      torsemide  100 mg Oral Daily Libia Rice DPM      warfarin  9 mg Oral Once per day on Mon Tue Wed Thu Fri Sat Dominga Escoto DO          Today, Patient Was Seen By: Dominga Escoto DO    **Please Note: This note may have been constructed using a voice recognition system  **

## 2022-01-06 NOTE — PLAN OF CARE
Problem: PHYSICAL THERAPY ADULT  Goal: Performs mobility at highest level of function for planned discharge setting  See evaluation for individualized goals  Description: Treatment/Interventions: ADL retraining,Functional transfer training,LE strengthening/ROM,Patient/family training,Equipment eval/education,Bed mobility,Gait training,Spoke to nursing,Spoke to case management,OT  Equipment Recommended: [facility]       See flowsheet documentation for full assessment, interventions and recommendations  Outcome: Progressing  Note: Prognosis: Fair  Problem List: Decreased strength,Decreased endurance,Impaired balance,Decreased mobility,Decreased safety awareness,Impaired judgement,Obesity,Orthopedic restrictions,Pain  Assessment: Pt is a 48 yo female presenting for PT treatment session today 1/6/22 with weight-bearing restrictions (NWB RLE), decreased mobility, decreased strength and endurance, poor adherence to precautions, balance deficits, fall risk, fatigue  These impairments are currently limiting her from returning to PLOF  Pt was greeted seated at EOB with no complaints from prior session, agreeable to treatment this afternoon  Currently able to demonstrate functional transfers, ambulation, and stair navigation techniques with Jules x1 requiring max verbal cues for maintaining NWB status on RLE, use of HHA this session  Pt able to ambulate using hop-to method and perform stand pivot technique to sit on stairs  Pt educated on proper technique for backward bumping in seated position up stairs, able to verbalize understanding of technique  Then able to demonstrate returning to recliner chair from staircase with Jules and HHA x1 still with poor insight into current WB restrictions  Placed OOB in recliner chair following session with all needs in reach  The patient's AM-PAC Basic Mobility Inpatient Short Form Raw Score is 14   A Raw score of less than or equal to 16 suggests the patient may benefit from discharge to post-acute rehabilitation services  Please also refer to the recommendation of the Physical Therapist for safe discharge planning  Would benefit from continued skilled PT within the acute care setting to address current deficits to optimize function  PT recommends rehab at this time following d/c    Barriers to Discharge: Inaccessible home environment        PT Discharge Recommendation: Post acute rehabilitation services          See flowsheet documentation for full assessment

## 2022-01-07 LAB
ABO GROUP BLD BPU: NORMAL
BPU ID: NORMAL
CROSSMATCH: NORMAL
GLUCOSE SERPL-MCNC: 124 MG/DL (ref 65–140)
GLUCOSE SERPL-MCNC: 182 MG/DL (ref 65–140)
GLUCOSE SERPL-MCNC: 87 MG/DL (ref 65–140)
GLUCOSE SERPL-MCNC: 87 MG/DL (ref 65–140)
UNIT DISPENSE STATUS: NORMAL
UNIT PRODUCT CODE: NORMAL
UNIT PRODUCT VOLUME: 250 ML
UNIT RH: NORMAL

## 2022-01-07 PROCEDURE — 82948 REAGENT STRIP/BLOOD GLUCOSE: CPT

## 2022-01-07 PROCEDURE — 99232 SBSQ HOSP IP/OBS MODERATE 35: CPT | Performed by: INTERNAL MEDICINE

## 2022-01-07 RX ORDER — OXYCODONE HYDROCHLORIDE 5 MG/1
5 TABLET ORAL EVERY 4 HOURS PRN
Status: DISCONTINUED | OUTPATIENT
Start: 2022-01-07 | End: 2022-01-09

## 2022-01-07 RX ORDER — OXYCODONE HYDROCHLORIDE 10 MG/1
10 TABLET ORAL EVERY 4 HOURS PRN
Status: DISCONTINUED | OUTPATIENT
Start: 2022-01-07 | End: 2022-01-09

## 2022-01-07 RX ADMIN — HEPARIN SODIUM 5000 UNITS: 5000 INJECTION INTRAVENOUS; SUBCUTANEOUS at 09:15

## 2022-01-07 RX ADMIN — ALPRAZOLAM 0.25 MG: 0.25 TABLET ORAL at 21:41

## 2022-01-07 RX ADMIN — ALPRAZOLAM 0.25 MG: 0.25 TABLET ORAL at 09:14

## 2022-01-07 RX ADMIN — INSULIN LISPRO 3 UNITS: 100 INJECTION, SOLUTION INTRAVENOUS; SUBCUTANEOUS at 09:15

## 2022-01-07 RX ADMIN — GABAPENTIN 100 MG: 100 CAPSULE ORAL at 18:24

## 2022-01-07 RX ADMIN — INSULIN GLARGINE 20 UNITS: 100 INJECTION, SOLUTION SUBCUTANEOUS at 21:42

## 2022-01-07 RX ADMIN — TORSEMIDE 100 MG: 20 TABLET ORAL at 09:14

## 2022-01-07 RX ADMIN — DICLOFENAC SODIUM 2 G: 10 GEL TOPICAL at 18:22

## 2022-01-07 RX ADMIN — DICLOFENAC SODIUM 2 G: 10 GEL TOPICAL at 09:15

## 2022-01-07 RX ADMIN — SEVELAMER HYDROCHLORIDE 1600 MG: 800 TABLET, FILM COATED PARENTERAL at 09:14

## 2022-01-07 RX ADMIN — GABAPENTIN 100 MG: 100 CAPSULE ORAL at 09:14

## 2022-01-07 RX ADMIN — SEVELAMER HYDROCHLORIDE 1600 MG: 800 TABLET, FILM COATED PARENTERAL at 12:10

## 2022-01-07 RX ADMIN — INSULIN LISPRO 3 UNITS: 100 INJECTION, SOLUTION INTRAVENOUS; SUBCUTANEOUS at 12:09

## 2022-01-07 RX ADMIN — OXYCODONE HYDROCHLORIDE 12.5 MG: 5 TABLET ORAL at 09:23

## 2022-01-07 RX ADMIN — WARFARIN SODIUM 9 MG: 7.5 TABLET ORAL at 18:24

## 2022-01-07 RX ADMIN — NIFEDIPINE 30 MG: 30 TABLET, FILM COATED, EXTENDED RELEASE ORAL at 09:14

## 2022-01-07 RX ADMIN — INSULIN LISPRO 3 UNITS: 100 INJECTION, SOLUTION INTRAVENOUS; SUBCUTANEOUS at 18:27

## 2022-01-07 RX ADMIN — DICLOFENAC SODIUM 2 G: 10 GEL TOPICAL at 12:10

## 2022-01-07 RX ADMIN — SERTRALINE HYDROCHLORIDE 75 MG: 50 TABLET ORAL at 09:14

## 2022-01-07 RX ADMIN — OXYCODONE HYDROCHLORIDE 10 MG: 10 TABLET ORAL at 18:29

## 2022-01-07 RX ADMIN — Medication 3 MG: at 21:41

## 2022-01-07 RX ADMIN — BACITRACIN ZINC, NEOMYCIN, POLYMYXIN B 1 SMALL APPLICATION: 400; 3.5; 5 OINTMENT TOPICAL at 18:24

## 2022-01-07 RX ADMIN — NYSTATIN: 100000 POWDER TOPICAL at 09:23

## 2022-01-07 RX ADMIN — BACITRACIN ZINC, NEOMYCIN, POLYMYXIN B 1 SMALL APPLICATION: 400; 3.5; 5 OINTMENT TOPICAL at 09:14

## 2022-01-07 RX ADMIN — CARVEDILOL 25 MG: 25 TABLET, FILM COATED ORAL at 09:14

## 2022-01-07 RX ADMIN — CINACALCET 30 MG: 30 TABLET, FILM COATED ORAL at 18:22

## 2022-01-07 RX ADMIN — SEVELAMER HYDROCHLORIDE 1600 MG: 800 TABLET, FILM COATED PARENTERAL at 18:25

## 2022-01-07 RX ADMIN — LEVOTHYROXINE SODIUM 50 MCG: 50 TABLET ORAL at 09:14

## 2022-01-07 RX ADMIN — GABAPENTIN 100 MG: 100 CAPSULE ORAL at 21:41

## 2022-01-07 RX ADMIN — CARVEDILOL 25 MG: 25 TABLET, FILM COATED ORAL at 18:25

## 2022-01-07 RX ADMIN — NYSTATIN: 100000 POWDER TOPICAL at 18:26

## 2022-01-07 NOTE — ASSESSMENT & PLAN NOTE
Routine hemodialysis per nephrology  Continue Renagel/Sensipar supplementation  Planning to dialyze tomorrow morning and then transferred to [facility] in the afternoon  Lab holiday tomorrow

## 2022-01-07 NOTE — ASSESSMENT & PLAN NOTE
Status post transmetatarsal amputation with Podiatry  Patient no monitor off antibiotics Infectious Disease input noted  Podiatry input noted patient is noncompliant with nonweightbearing status in this was discussed with the patient and strongly recommended nonweightbearing status for proper healing  Physical therapy  -pending placement to [facility]

## 2022-01-07 NOTE — ASSESSMENT & PLAN NOTE
Monitor hemoglobin, has been stable around 7-8 for the past several days    -patient was transfused 1 unit packed red blood cells yesterday with appropriate response in hemoglobin  -no signs of blood loss

## 2022-01-07 NOTE — PLAN OF CARE
Problem: Potential for Falls  Goal: Patient will remain free of falls  Description: INTERVENTIONS:  - Educate patient/family on patient safety including physical limitations  - Instruct patient to call for assistance with activity   - Consult OT/PT to assist with strengthening/mobility   - Keep Call bell within reach  - Keep bed low and locked with side rails adjusted as appropriate  - Keep care items and personal belongings within reach  - Initiate and maintain comfort rounds  - Make Fall Risk Sign visible to staff  - Offer Toileting every 2 Hours, in advance of need  - Initiate/Maintain BED alarm  - Obtain necessary fall risk management equipment:   - Apply yellow socks and bracelet for high fall risk patients  - Consider moving patient to room near nurses station  Outcome: Progressing    Problem: INFECTION - ADULT  Goal: Absence or prevention of progression during hospitalization  Description: INTERVENTIONS:  - Assess and monitor for signs and symptoms of infection  - Monitor lab/diagnostic results  - Monitor all insertion sites, i e  indwelling lines, tubes, and drains  - Monitor endotracheal if appropriate and nasal secretions for changes in amount and color  - Hernando appropriate cooling/warming therapies per order  - Administer medications as ordered  - Instruct and encourage patient and family to use good hand hygiene technique  - Identify and instruct in appropriate isolation precautions for identified infection/condition  Outcome: Progressing  Goal: Absence of fever/infection during neutropenic period  Description: INTERVENTIONS:  - Monitor WBC    Outcome: Progressing     Problem: PAIN - ADULT  Goal: Verbalizes/displays adequate comfort level or baseline comfort level  Description: Interventions:  - Encourage patient to monitor pain and request assistance  - Assess pain using appropriate pain scale  - Administer analgesics based on type and severity of pain and evaluate response  - Implement non-pharmacological measures as appropriate and evaluate response  - Consider cultural and social influences on pain and pain management  - Notify physician/advanced practitioner if interventions unsuccessful or patient reports new pain  Outcome: Progressing

## 2022-01-07 NOTE — PROGRESS NOTES
[facility] PROGRESS NOTE   Leonila Narayan 49 y o  female MRN: [id number]  [facility] Encounter: [id number]  Reason for Consult: ESRD    ASSESSMENT and PLAN:    57-year-old female with a past medical history of ESRD, hypertension, diabetes, GERD, obesity, DVT who initially presents on 12/21 with lower extremity foot wound   Nephrology is consulted for ESRD  Christine Arreola is concern for osteomyelitis     1) ESRD     -outpatient unit is [address]  -access left upper extremity AV fistula  -patient had dialysis last on Sunday due to holiday schedule  -received blood transfusion on January 6  -next dialysis Saturday     Plan  -continue TTS dialysis-next dialysis Saturday  -status post transfusion on January 6  -repeat CBC with appropriately increased hemoglobin level overall  -reviewed case with primary attending  -plan for dialysis Saturday morning for potential discharge Saturday afternoon to [facility]     2) volume     -ultrafiltrate with dialysis as tolerates     3) osteomyelitis     -per primary team  -status post right TMA  -patient being encouraged by Podiatry team to avoid weight-bearing as the patient is not compliant with this as often as should be      4) anemia     -no MITCHELL due to history of PE  -not a candidate for IV iron as last ferritin was above 1300  -will recheck iron levels as outpatient  -transfusion completed on January 6     5) hypertension     -monitor with current regimen     6) MBD-on phosphorus binders     -on Sensipar also     7) electrolytes-stable sodium and potassium     8) acid/base-stable bicarbonate    SUBJECTIVE / 24H INTERVAL HISTORY:    Blood pressure is labile 589-098 systolic  Afebrile  On room air  Patient denies complaints when seen at bedside today  With the exception of feeling not well after transfusion yesterday  Weak  But improved today      OBJECTIVE:  Current Weight: Weight - Scale: 122 kg (268 lb 15 4 oz)  Vitals:    01/06/22 1050 01/06/22 1532 01/06/22 2220 01/07/22 0855   BP: 145/59 119/68 128/63 (!) 189/79   BP Location:       Pulse: 70   68   Resp: 16   18   Temp: (!) 97 3 °F (36 3 °C) 98 2 °F (36 8 °C) 98 °F (36 7 °C) 98 5 °F (36 9 °C)   TempSrc: Oral   Oral   SpO2:    98%   Weight:       Height:           Intake/Output Summary (Last 24 hours) at 1/7/2022 1225  Last data filed at 1/6/2022 1838  Gross per 24 hour   Intake 0 ml   Output --   Net 0 ml     General: NAD  Skin: no rash  Eyes: anicteric sclera  ENT: moist mucous membrane  Neck: supple  Chest: CTA b/l, no ronchii, no wheeze, no rubs, no rales  CVS: s1s2, no murmur, no gallop, no rub  Abdomen: soft, nontender, nl sounds  Extremities: 1+ edema LE b/l  : no hernandez  Neuro: AAOX3  Psych: normal affect  - AVF without good thrill/bruit    Medications:    Current Facility-Administered Medications:     acetaminophen (TYLENOL) tablet 650 mg, 650 mg, Oral, Q6H PRN, Dalphine Princess, DPM, 650 mg at 12/28/21 1721    albuterol (PROVENTIL HFA,VENTOLIN HFA) inhaler 2 puff, 2 puff, Inhalation, Q6H PRN, Dalphine Princess, DPM    ALPRAZolam Filemon So) tablet 0 25 mg, 0 25 mg, Oral, 4x Daily PRN, Dalphine Princess, DPM, 0 25 mg at 01/07/22 0914    ammonium lactate (LAC-HYDRIN) 12 % cream, , Topical, BID PRN, Dalphine Princess, DPM, Given at 12/21/21 2207    calcium carbonate (TUMS) chewable tablet 500 mg, 500 mg, Oral, TID PRN, Kaitlyn Hernandez MD, 500 mg at 12/30/21 2343    carvedilol (COREG) tablet 25 mg, 25 mg, Oral, BID With Meals, Dalphine Princess, DPM, 25 mg at 01/07/22 0914    cinacalcet (SENSIPAR) tablet 30 mg, 30 mg, Oral, Daily, Dania Sánchez DPM, 30 mg at 01/06/22 1810    Diclofenac Sodium (VOLTAREN) 1 % topical gel 2 g, 2 g, Topical, 4x Daily, Kaitlyn Hernandez MD, 2 g at 01/07/22 1210    fentaNYL (SUBLIMAZE) injection 25 mcg, 25 mcg, Intravenous, Q5 Min PRN, Manasa Chel, CRNA    gabapentin (NEURONTIN) capsule 100 mg, 100 mg, Oral, TID, Dania Sánchez DPM, 100 mg at 01/07/22 0914    hydrALAZINE (APRESOLINE) injection 5 mg, 5 mg, Intravenous, Q6H PRN, Leslie Gobble, DPM, 5 mg at 12/21/21 0047    insulin glargine (LANTUS) subcutaneous injection 20 Units 0 2 mL, 20 Units, Subcutaneous, HS, Leslie Gobble, DPM, 20 Units at 01/06/22 2119    insulin lispro (HumaLOG) 100 units/mL subcutaneous injection 1-5 Units, 1-5 Units, Subcutaneous, TID AC, 1 Units at 01/04/22 1843 **AND** Fingerstick Glucose (POCT), , , TID AC, Leslie Gobble, DPM    insulin lispro (HumaLOG) 100 units/mL subcutaneous injection 3 Units, 3 Units, Subcutaneous, TID With Meals, Leslie Gobble, DPM, 3 Units at 01/07/22 0915    levothyroxine tablet 50 mcg, 50 mcg, Oral, Daily, Leslie Gobble, DPM, 50 mcg at 01/07/22 0914    lidocaine (LIDODERM) 5 % patch 1 patch, 1 patch, Topical, Daily, Violeta English MD, 1 patch at 01/05/22 0933    melatonin tablet 3 mg, 3 mg, Oral, HS, Leslie Gobble, DPM, 3 mg at 01/06/22 2119    metoclopramide (REGLAN) tablet 10 mg, 10 mg, Oral, Q6H PRN, Uriel Macias MD    neomycin-bacitracin-polymyxin b (NEOSPORIN) ointment 1 small application, 1 small application, Topical, BID, Ferdinand Cardona DO, 1 small application at 22/52/81 0914    NIFEdipine (PROCARDIA XL) 24 hr tablet 30 mg, 30 mg, Oral, Daily, Leslie Gobble, DPM, 30 mg at 01/07/22 0914    nystatin (MYCOSTATIN) powder, , Topical, BID, Leslie Gobble, DPM, Given at 01/07/22 4925    ondansetron (ZOFRAN-ODT) dispersible tablet 4 mg, 4 mg, Oral, Q6H PRN, Uriel Macias MD, 4 mg at 01/06/22 1949    oxyCODONE (ROXICODONE) IR tablet 7 5 mg, 7 5 mg, Oral, Q4H PRN, 7 5 mg at 01/06/22 2120 **OR** oxyCODONE (ROXICODONE) IR tablet 12 5 mg, 12 5 mg, Oral, Q4H PRN, Ferdinand Linhai, DO, 12 5 mg at 01/07/22 0923    pantoprazole (PROTONIX) EC tablet 40 mg, 40 mg, Oral, Early Morning, Leslie Gobble, DPM, 40 mg at 01/06/22 0559    polyethylene glycol (MIRALAX) packet 17 g, 17 g, Oral, Daily PRN, Leslie Gobble, DPM    senna (SENOKOT) tablet 17 2 mg, 2 tablet, Oral, HS PRN, Leslie Gobble, DPM    sertraline (ZOLOFT) tablet 75 mg, 75 mg, Oral, Daily, Dalphine Princess, DPM, 75 mg at 01/07/22 0914    sevelamer (RENAGEL) tablet 1,600 mg, 1,600 mg, Oral, TID With Meals, Dalphine Princess, DPM, 1,600 mg at 01/07/22 1210    torsemide (DEMADEX) tablet 100 mg, 100 mg, Oral, Daily, Dalphine Princess, DPM, 100 mg at 01/07/22 1853    warfarin (COUMADIN) tablet 9 mg, 9 mg, Oral, Once per day on Mon Tue Wed Thu Fri Sat, Faustina Nakul Bethea Hojude, DO, 9 mg at 01/06/22 1807    Laboratory Results:  Results from last 7 days   Lab Units 01/06/22  1649 01/06/22  0759 01/05/22  0658 01/05/22  0653 01/04/22  0751 01/02/22  0803 01/01/22  1409   WBC Thousand/uL 6 60 7 45  --  6 49 7 53 7 68 6 80   HEMOGLOBIN g/dL 7 7* 7 0*  --  6 5* 7 1* 7 0* 7 4*   HEMATOCRIT % 23 1* 21 8*  --  20 1* 21 5* 21 1* 23 1*   PLATELETS Thousands/uL 143* 199  --  170 204 193 196   POTASSIUM mmol/L  --  4 9 4 7  --  5 5*  --   --    CHLORIDE mmol/L  --  100 102  --  98*  --   --    CO2 mmol/L  --  25 27  --  25  --   --    BUN mg/dL  --  72* 57*  --  77*  --   --    CREATININE mg/dL  --  7 29* 5 44*  --  7 07*  --   --    CALCIUM mg/dL  --  8 7 8 5  --  8 6  --   --

## 2022-01-07 NOTE — ASSESSMENT & PLAN NOTE
Warfarin for anticoagulation  Goal INR 2-3, PT INR within range, continue 9 mg scheduled Monday through Saturday

## 2022-01-07 NOTE — PROGRESS NOTES
[address] [facility]  Progress Note Vena Mena 1965, 49 y o  female MRN: [id number]  Unit/Bed#: [facility] 933-01 Encounter: [id number]  Primary Care Provider: Miranda Holguin MD   Date and time admitted to hospital: 12/20/2021  3:07 PM    Essential hypertension  Assessment & Plan  Monitor blood pressures  Avoid hypotension    Insulin-dependent diabetes mellitus with neuropathy  Assessment & Plan  Lab Results   Component Value Date    HGBA1C 7 9 (H) 09/30/2021     Continue basal/prandial insulin w/ additional SSI coverage per Accu-Cheks  Carbohydrate restricted diet  Continue Gabapentin for neuropathy    Hyperlipidemia  Assessment & Plan  Continue Lipitor    Morbid obesity  Assessment & Plan  BMI of 43 41  Lifestyle/diet modifications    Anemia of chronic disease  Assessment & Plan  Monitor hemoglobin, has been stable around 7-8 for the past several days    -patient was transfused 1 unit packed red blood cells yesterday with appropriate response in hemoglobin  -no signs of blood loss    ESRD on hemodialysis  Assessment & Plan  Routine hemodialysis per nephrology  Continue Renagel/Sensipar supplementation  Planning to dialyze tomorrow morning and then transferred to [facility] in the afternoon  Lab holiday tomorrow      History of venous thromboembolism  Assessment & Plan  Warfarin for anticoagulation  Goal INR 2-3, PT INR within range, continue 9 mg scheduled Monday through Saturday    * Right foot ulceration with osteomyelitis  Assessment & Plan  Status post transmetatarsal amputation with Podiatry  Patient no monitor off antibiotics Infectious Disease input noted  Podiatry input noted patient is noncompliant with nonweightbearing status in this was discussed with the patient and strongly recommended nonweightbearing status for proper healing  Physical therapy  -pending placement to [facility]              VTE Pharmacologic Prophylaxis:   High Risk (Score >/= 5) - Pharmacological DVT Prophylaxis Ordered: warfarin (Coumadin)  Sequential Compression Devices Ordered  Patient Centered Rounds: I performed bedside rounds with nursing staff today  Discussions with Specialists or Other Care Team Provider:  Nephrology    Education and Discussions with Family / Patient: Updated  (significant other) at bedside  Time Spent for Care: 30 minutes  More than 50% of total time spent on counseling and coordination of care as described above  Current Length of Stay: 18 day(s)  Current Patient Status: Inpatient   Certification Statement: The patient will continue to require additional inpatient hospital stay due to Pending placement  Discharge Plan: Anticipate discharge tomorrow to rehab facility  Code Status: Level 1 - Full Code    Subjective:   Patient reports feeling weak and fatigued after the blood transfusion yesterday  Denies any other complaints currently    Objective:     Vitals:   Temp (24hrs), Av 2 °F (36 8 °C), Min:98 °F (36 7 °C), Max:98 5 °F (36 9 °C)    Temp:  [98 °F (36 7 °C)-98 5 °F (36 9 °C)] 98 5 °F (36 9 °C)  HR:  [68] 68  Resp:  [18] 18  BP: (119-189)/(63-79) 189/79  SpO2:  [98 %] 98 %  Body mass index is 43 41 kg/m²  Input and Output Summary (last 24 hours): Intake/Output Summary (Last 24 hours) at 2022 1314  Last data filed at 2022 1838  Gross per 24 hour   Intake 0 ml   Output --   Net 0 ml       Physical Exam:   Physical Exam  Constitutional:       General: She is not in acute distress  Appearance: She is obese  She is not ill-appearing, toxic-appearing or diaphoretic  Eyes:      General: No scleral icterus  Cardiovascular:      Rate and Rhythm: Regular rhythm  Heart sounds: No murmur heard  No friction rub  No gallop  Pulmonary:      Effort: No respiratory distress  Breath sounds: No stridor  No wheezing, rhonchi or rales  Chest:      Chest wall: No tenderness     Abdominal:      General: There is no distension  Palpations: There is no mass  Tenderness: There is no abdominal tenderness  There is no guarding or rebound  Hernia: No hernia is present  Musculoskeletal:         General: No tenderness, deformity or signs of injury  Left lower leg: No edema  Comments: Right foot wrapped clean dry intact   Skin:     Coloration: Skin is not jaundiced or pale  Findings: No bruising or erythema  Neurological:      Mental Status: She is oriented to person, place, and time  Additional Data:     Labs:  Results from last 7 days   Lab Units 01/06/22  1649 01/06/22  0759 01/05/22  0653   WBC Thousand/uL 6 60   < > 6 49   HEMOGLOBIN g/dL 7 7*   < > 6 5*   HEMATOCRIT % 23 1*   < > 20 1*   PLATELETS Thousands/uL 143*   < > 170   NEUTROS PCT %  --   --  64   LYMPHS PCT %  --   --  18   MONOS PCT %  --   --  10   EOS PCT %  --   --  6    < > = values in this interval not displayed  Results from last 7 days   Lab Units 01/06/22  0759   SODIUM mmol/L 134*   POTASSIUM mmol/L 4 9   CHLORIDE mmol/L 100   CO2 mmol/L 25   BUN mg/dL 72*   CREATININE mg/dL 7 29*   ANION GAP mmol/L 9   CALCIUM mg/dL 8 7   GLUCOSE RANDOM mg/dL 132     Results from last 7 days   Lab Units 01/06/22  0759   INR  1 94*     Results from last 7 days   Lab Units 01/07/22  1122 01/07/22  0919 01/06/22  1656 01/06/22  1407 01/05/22  2111 01/05/22  1540 01/05/22  1150 01/04/22  2034 01/04/22  1649 01/03/22  2131 01/03/22  1119 01/03/22  0725   POC GLUCOSE mg/dl 124 87 146* 242* 188* 143* 116 126 181* 165* 147* 178*               Lines/Drains:  Invasive Devices  Report    Peripheral Intravenous Line            Peripheral IV 01/06/22 Left Antecubital <1 day          Line            Hemodialysis AV Fistula 02/20/18 Left Forearm 1416 days                      Imaging: No pertinent imaging reviewed      Recent Cultures (last 7 days):         Last 24 Hours Medication List:   Current Facility-Administered Medications   Medication Dose Route Frequency Provider Last Rate    acetaminophen  650 mg Oral Q6H PRN Jen Fells, DPM      albuterol  2 puff Inhalation Q6H PRN Jen Arts, DPM      ALPRAZolam  0 25 mg Oral 4x Daily PRN Jen Arts, DPM      ammonium lactate   Topical BID PRN Jen Fells, DPM      calcium carbonate  500 mg Oral TID PRN Janine Wallis MD      carvedilol  25 mg Oral BID With Meals [facility], DPM      cinacalcet  30 mg Oral Daily [place], [place]      Diclofenac Sodium  2 g Topical 4x Daily Janine Wallis MD      fentaNYL  25 mcg Intravenous Q5 Min PRN Logan Mckeon CRNA      gabapentin  100 mg Oral TID Jen Arts, DPM      hydrALAZINE  5 mg Intravenous Q6H PRN Jen Arts, DPM      insulin glargine  20 Units Subcutaneous HS Jen Arts, DPM      insulin lispro  1-5 Units Subcutaneous TID [facility] Jen Arts, DPM      insulin lispro  3 Units Subcutaneous TID With Meals Jenbrigitte Arts, DPM      levothyroxine  50 mcg Oral Daily Jen Fells, DPM      lidocaine  1 patch Topical Daily Janine Wallis MD      melatonin  3 mg Oral HS Jen Kaelyns, DPM      metoclopramide  10 mg Oral Q6H PRN Vinod Yoon MD      neomycin-bacitracin-polymyxin b  1 small application Topical BID Ferdinand Banai, DO      NIFEdipine  30 mg Oral Daily Jen Fells, DPM      nystatin   Topical BID [facility], DPM      ondansetron  4 mg Oral Q6H PRN Vinod Yoon MD      oxyCODONE  7 5 mg Oral Q4H PRN Ferdinand Banai, DO      Or    oxyCODONE  12 5 mg Oral Q4H PRN Ferdinand Banai, DO      pantoprazole  40 mg Oral Early Morning Jenbrigitte Arts, DPM      polyethylene glycol  17 g Oral Daily PRN [facility], DPM      senna  2 tablet Oral HS PRN [facility], DPM      sertraline  75 mg Oral Daily [place], [place]      sevelamer  1,600 mg Oral TID With Meals Jenbrigitte Maier, DPM      torsemide  100 mg Oral Daily [facility], DPM      warfarin  9 mg Oral Once per day on Mon Tue Wed Thu Fri [facility], DO          Today, Patient Was Seen By: Jhon Romo DO    **Please Note: This note may have been constructed using a voice recognition system  **

## 2022-01-08 ENCOUNTER — APPOINTMENT (INPATIENT)
Dept: DIALYSIS | Facility: HOSPITAL | Age: 57
DRG: 617 | End: 2022-01-08
Payer: MEDICARE

## 2022-01-08 LAB
FLUAV RNA RESP QL NAA+PROBE: NEGATIVE
FLUBV RNA RESP QL NAA+PROBE: NEGATIVE
GLUCOSE SERPL-MCNC: 132 MG/DL (ref 65–140)
GLUCOSE SERPL-MCNC: 142 MG/DL (ref 65–140)
GLUCOSE SERPL-MCNC: 90 MG/DL (ref 65–140)
RSV RNA RESP QL NAA+PROBE: NEGATIVE
SARS-COV-2 RNA RESP QL NAA+PROBE: NEGATIVE

## 2022-01-08 PROCEDURE — 90945 DIALYSIS ONE EVALUATION: CPT | Performed by: INTERNAL MEDICINE

## 2022-01-08 PROCEDURE — 0241U HB NFCT DS VIR RESP RNA 4 TRGT: CPT | Performed by: INTERNAL MEDICINE

## 2022-01-08 PROCEDURE — 99232 SBSQ HOSP IP/OBS MODERATE 35: CPT | Performed by: INTERNAL MEDICINE

## 2022-01-08 PROCEDURE — 82948 REAGENT STRIP/BLOOD GLUCOSE: CPT

## 2022-01-08 RX ADMIN — ALPRAZOLAM 0.25 MG: 0.25 TABLET ORAL at 14:18

## 2022-01-08 RX ADMIN — GABAPENTIN 100 MG: 100 CAPSULE ORAL at 22:45

## 2022-01-08 RX ADMIN — LIDOCAINE 5% 1 PATCH: 700 PATCH TOPICAL at 12:57

## 2022-01-08 RX ADMIN — ALPRAZOLAM 0.25 MG: 0.25 TABLET ORAL at 08:04

## 2022-01-08 RX ADMIN — OXYCODONE HYDROCHLORIDE 10 MG: 10 TABLET ORAL at 12:55

## 2022-01-08 RX ADMIN — NYSTATIN: 100000 POWDER TOPICAL at 22:50

## 2022-01-08 RX ADMIN — BACITRACIN ZINC, NEOMYCIN, POLYMYXIN B 1 SMALL APPLICATION: 400; 3.5; 5 OINTMENT TOPICAL at 17:44

## 2022-01-08 RX ADMIN — LEVOTHYROXINE SODIUM 50 MCG: 50 TABLET ORAL at 12:56

## 2022-01-08 RX ADMIN — DICLOFENAC SODIUM 2 G: 10 GEL TOPICAL at 13:01

## 2022-01-08 RX ADMIN — INSULIN LISPRO 3 UNITS: 100 INJECTION, SOLUTION INTRAVENOUS; SUBCUTANEOUS at 13:00

## 2022-01-08 RX ADMIN — Medication 3 MG: at 22:45

## 2022-01-08 RX ADMIN — GABAPENTIN 100 MG: 100 CAPSULE ORAL at 12:56

## 2022-01-08 RX ADMIN — GABAPENTIN 100 MG: 100 CAPSULE ORAL at 17:43

## 2022-01-08 RX ADMIN — BACITRACIN ZINC, NEOMYCIN, POLYMYXIN B 1 SMALL APPLICATION: 400; 3.5; 5 OINTMENT TOPICAL at 12:57

## 2022-01-08 RX ADMIN — PANTOPRAZOLE SODIUM 40 MG: 40 TABLET, DELAYED RELEASE ORAL at 06:06

## 2022-01-08 RX ADMIN — INSULIN GLARGINE 20 UNITS: 100 INJECTION, SOLUTION SUBCUTANEOUS at 22:45

## 2022-01-08 RX ADMIN — TORSEMIDE 100 MG: 20 TABLET ORAL at 12:53

## 2022-01-08 RX ADMIN — CARVEDILOL 25 MG: 25 TABLET, FILM COATED ORAL at 12:56

## 2022-01-08 RX ADMIN — SERTRALINE HYDROCHLORIDE 75 MG: 50 TABLET ORAL at 12:55

## 2022-01-08 RX ADMIN — SEVELAMER HYDROCHLORIDE 1600 MG: 800 TABLET, FILM COATED PARENTERAL at 12:52

## 2022-01-08 RX ADMIN — SEVELAMER HYDROCHLORIDE 1600 MG: 800 TABLET, FILM COATED PARENTERAL at 17:43

## 2022-01-08 RX ADMIN — INSULIN LISPRO 3 UNITS: 100 INJECTION, SOLUTION INTRAVENOUS; SUBCUTANEOUS at 17:42

## 2022-01-08 RX ADMIN — OXYCODONE HYDROCHLORIDE 10 MG: 10 TABLET ORAL at 06:06

## 2022-01-08 RX ADMIN — CARVEDILOL 25 MG: 25 TABLET, FILM COATED ORAL at 17:44

## 2022-01-08 RX ADMIN — CINACALCET 30 MG: 30 TABLET, FILM COATED ORAL at 17:44

## 2022-01-08 RX ADMIN — DICLOFENAC SODIUM 2 G: 10 GEL TOPICAL at 22:50

## 2022-01-08 RX ADMIN — NIFEDIPINE 30 MG: 30 TABLET, FILM COATED, EXTENDED RELEASE ORAL at 13:01

## 2022-01-08 RX ADMIN — WARFARIN SODIUM 9 MG: 7.5 TABLET ORAL at 17:44

## 2022-01-08 NOTE — ASSESSMENT & PLAN NOTE
Status post transmetatarsal amputation with Podiatry  Patient no monitor off antibiotics Infectious Disease input noted  Podiatry input noted patient is noncompliant with nonweightbearing status in this was discussed with the patient and strongly recommended nonweightbearing status for proper healing  Physical therapy  -pending placement to [facility]-unfortunately delayed currently secondary to insurance issues

## 2022-01-08 NOTE — ASSESSMENT & PLAN NOTE
Routine hemodialysis per nephrology  Continue Renagel/Sensipar supplementation    Will check PT INR and CBC tomorrow

## 2022-01-08 NOTE — PLAN OF CARE
Problem: METABOLIC, FLUID AND ELECTROLYTES - ADULT  Goal: Electrolytes maintained within normal limits  Description: INTERVENTIONS:  - Monitor labs and assess patient for signs and symptoms of electrolyte imbalances  - Administer electrolyte replacement as ordered  - Monitor response to electrolyte replacements, including repeat lab results as appropriate  - Instruct patient on fluid and nutrition as appropriate  1/7/2022 2304 by Zoila Livingston RN  Outcome: Progressing  1/7/2022 2150 by Zoila Livingston RN  Outcome: Progressing

## 2022-01-08 NOTE — PROGRESS NOTES
NEPHROLOGY PROGRESS NOTE   Kat Shepherd 49 y o  female MRN: [id number]  Unit/Bed#: [facility] 933-01 Encounter: [id number]    Hemodialysis procedure note:  Patient was seen on hemodialysis approximately 9:46 a m  Tolerating treatment without difficulty attempting 4 L ultrafiltration    ASSESSMENT & PLAN    51-year-old female with past medical history of end-stage kidney disease on hemodialysis, hypertension, type 2 diabetes, GERD, obesity, DVT you initially presented with lower extremity foot wound, with concerns for osteomyelitis    1  End-stage kidney disease on hemodialysis-Tuesday Thursday Saturday it had [facility] [address]-access left upper extremity AV fistula and potential discharge later today  2  Volume overload-dry weight is elevated, will continue to challenge gained about 3 3 kg S no shortness of breath since last treatment ultrafiltration for today    3  Osteomyelitis-per primary team-status post right TMA-ongoing rehab     4  Anemia of chronic kidney disease-no MITCHELL due to history of PE not a candidate for IV iron as elevated ferritin monitor hemoglobin     5  Hypertension-monitor current regimen    6   CKD bone mineral disease-on phosphorus binders, on Sensipar       SUBJECTIVE:    Patient was seen today  Denies any acute chest pain or shortness of breath    OBJECTIVE:  Current Weight: Weight - Scale: 122 kg (268 lb 15 4 oz)  @  Vitals:    01/08/22 0800 01/08/22 0830 01/08/22 0900 01/08/22 0930   BP: 149/55 (!) 130/118 (!) 168/121 (!) 188/46   BP Location:       Pulse: 66 67 68 65   Resp: 18 18 18 18   Temp:       TempSrc:       SpO2:       Weight:       Height:           Intake/Output Summary (Last 24 hours) at 1/8/2022 0944  Last data filed at 1/8/2022 0745  Gross per 24 hour   Intake 360 ml   Output --   Net 360 ml     Weight (last 2 days)     None          General: conscious, cooperative, in no acute distress  Eyes: conjunctivae pink, anicteric sclerae  ENT: lips and mucous membranes moist  Neck: supple, no JVD  Chest: no respiratory distress, no accessory muscle use, normal respiratory effort  CVS: normal heart rate, no friction rub  Abdomen: soft, non-tender, non-distended, normoactive bowel sounds  Extremities:  Plus edema bilaterally left upper extremity AV fistula in use  Skin: no rash  Neuro: awake, alert, oriented      Medications:    Current Facility-Administered Medications:     acetaminophen (TYLENOL) tablet 650 mg, 650 mg, Oral, Q6H PRN, Lendia Medal, DPM, 650 mg at 12/28/21 1721    albuterol (PROVENTIL HFA,VENTOLIN HFA) inhaler 2 puff, 2 puff, Inhalation, Q6H PRN, Lendia Medal, DPM    ALPRAZolam Hilary Betters) tablet 0 25 mg, 0 25 mg, Oral, 4x Daily PRN, Lendia Medal, DPM, 0 25 mg at 01/08/22 0804    ammonium lactate (LAC-HYDRIN) 12 % cream, , Topical, BID PRN, Lendia Medal, DPM, Given at 12/21/21 2207    calcium carbonate (TUMS) chewable tablet 500 mg, 500 mg, Oral, TID PRN, Fabiana Chavarria MD, 500 mg at 12/30/21 2343    carvedilol (COREG) tablet 25 mg, 25 mg, Oral, BID With Meals, Lendia Medal, DPM, 25 mg at 01/07/22 1825    cinacalcet (SENSIPAR) tablet 30 mg, 30 mg, Oral, Daily, Lendia Medal, DPM, 30 mg at 01/07/22 1822    Diclofenac Sodium (VOLTAREN) 1 % topical gel 2 g, 2 g, Topical, 4x Daily, Fabiana Chavarria MD, 2 g at 01/07/22 1822    fentaNYL (SUBLIMAZE) injection 25 mcg, 25 mcg, Intravenous, Q5 Min PRN, Yoselin King CRNA    gabapentin (NEURONTIN) capsule 100 mg, 100 mg, Oral, TID, Lendia Medal, DPM, 100 mg at 01/07/22 2141    hydrALAZINE (APRESOLINE) injection 5 mg, 5 mg, Intravenous, Q6H PRN, Lendia Medal, DPM, 5 mg at 12/21/21 0047    insulin glargine (LANTUS) subcutaneous injection 20 Units 0 2 mL, 20 Units, Subcutaneous, HS, Lendia Medal, DPM, 20 Units at 01/07/22 2142    insulin lispro (HumaLOG) 100 units/mL subcutaneous injection 1-5 Units, 1-5 Units, Subcutaneous, TID AC, 1 Units at 01/04/22 1843 **AND** Fingerstick Glucose (POCT), , , TID AC, Lendia Medal, DPM    insulin lispro (HumaLOG) 100 units/mL subcutaneous injection 3 Units, 3 Units, Subcutaneous, TID With Meals, Gail Goodwin DPM, 3 Units at 01/07/22 1827    levothyroxine tablet 50 mcg, 50 mcg, Oral, Daily, Gail Goodwin DPM, 50 mcg at 01/07/22 0914    lidocaine (LIDODERM) 5 % patch 1 patch, 1 patch, Topical, Daily, Eufemia Lopez MD, 1 patch at 01/05/22 0933    melatonin tablet 3 mg, 3 mg, Oral, HS, WILTON BlueM, 3 mg at 01/07/22 2141    metoclopramide (REGLAN) tablet 10 mg, 10 mg, Oral, Q6H PRN, Brandyn Arciniega MD    neomycin-bacitracin-polymyxin b (NEOSPORIN) ointment 1 small application, 1 small application, Topical, BID, Ferdinand Cardona DO, 1 small application at 97/44/03 1824    NIFEdipine (PROCARDIA XL) 24 hr tablet 30 mg, 30 mg, Oral, Daily, Gail Goodwin DPM, 30 mg at 01/07/22 0914    nystatin (MYCOSTATIN) powder, , Topical, BID, Gail Goodwin DPM, Given at 01/07/22 1826    ondansetron (ZOFRAN-ODT) dispersible tablet 4 mg, 4 mg, Oral, Q6H PRN, Brandyn Arciniega MD, 4 mg at 01/06/22 1949    oxyCODONE (ROXICODONE) IR tablet 5 mg, 5 mg, Oral, Q4H PRN **OR** oxyCODONE (ROXICODONE) immediate release tablet 10 mg, 10 mg, Oral, Q4H PRN, Ferdinand Cardona DO, 10 mg at 01/08/22 0606    pantoprazole (PROTONIX) EC tablet 40 mg, 40 mg, Oral, Early Morning, Gail Goodwin DPM, 40 mg at 01/08/22 0606    polyethylene glycol (MIRALAX) packet 17 g, 17 g, Oral, Daily PRN, Gail Goodwin DPM    senna (SENOKOT) tablet 17 2 mg, 2 tablet, Oral, HS PRN, Gail Goodwin DPM    sertraline (ZOLOFT) tablet 75 mg, 75 mg, Oral, Daily, Gail Muss, DPM, 75 mg at 01/07/22 0914    sevelamer (RENAGEL) tablet 1,600 mg, 1,600 mg, Oral, TID With Meals, Gail Goodwin, DPM, 1,600 mg at 01/07/22 1825    torsemide (DEMADEX) tablet 100 mg, 100 mg, Oral, Daily, Gail Goodwin, DPM, 100 mg at 01/07/22 6054    warfarin (COUMADIN) tablet 9 mg, 9 mg, Oral, Once per day on Mon Tue Wed Thu Fri Sat, Ferdinand Cardona DO, 9 mg at 01/07/22 1824    Invasive Devices: Lab Results:   Results from last 7 days   Lab Units 01/06/22  1649 01/06/22  0759 01/05/22  0658 01/05/22  0653 01/04/22  0751 01/02/22  0803   WBC Thousand/uL 6 60 7 45  --  6 49 7 53 7 68   HEMOGLOBIN g/dL 7 7* 7 0*  --  6 5* 7 1* 7 0*   HEMATOCRIT % 23 1* 21 8*  --  20 1* 21 5* 21 1*   PLATELETS Thousands/uL 143* 199  --  170 204 193   POTASSIUM mmol/L  --  4 9 4 7  --  5 5*  --    CHLORIDE mmol/L  --  100 102  --  98*  --    CO2 mmol/L  --  25 27  --  25  --    BUN mg/dL  --  72* 57*  --  77*  --    CREATININE mg/dL  --  7 29* 5 44*  --  7 07*  --    CALCIUM mg/dL  --  8 7 8 5  --  8 6  --          Portions of the record may have been created with voice recognition software  Occasional wrong word or "sound a like" substitutions may have occurred due to the inherent limitations of voice recognition software  Read the chart carefully and recognize, using context, where substitutions have occurred  If you have any questions, please contact the dictating provider

## 2022-01-08 NOTE — PROGRESS NOTES
[address] [facility]  Progress Note Negrito Pineda 1965, 49 y o  female MRN: [id number]  Unit/Bed#: [facility] 933-01 Encounter: [id number]  Primary Care Provider: Simeon Aguirre MD   Date and time admitted to hospital: 12/20/2021  3:07 PM    Essential hypertension  Assessment & Plan  Monitor blood pressures  Avoid hypotension    Insulin-dependent diabetes mellitus with neuropathy  Assessment & Plan  Lab Results   Component Value Date    HGBA1C 7 9 (H) 09/30/2021     Continue basal/prandial insulin w/ additional SSI coverage per Accu-Cheks  Carbohydrate restricted diet  Continue Gabapentin for neuropathy    Hyperlipidemia  Assessment & Plan  Continue Lipitor    Morbid obesity  Assessment & Plan  BMI of 43 41  Lifestyle/diet modifications    Anemia of chronic disease  Assessment & Plan  Monitor hemoglobin, has been stable around 7-8 for the past several days    -patient was transfused 1 unit packed red blood cells two days ago with appropriate response in hemoglobin  -no signs of blood loss    ESRD on hemodialysis  Assessment & Plan  Routine hemodialysis per nephrology  Continue Renagel/Sensipar supplementation    Will check PT INR and CBC tomorrow      History of venous thromboembolism  Assessment & Plan  Warfarin for anticoagulation  Goal INR 2-3, PT INR within range, continue 9 mg scheduled Monday through Saturday    * Right foot ulceration with osteomyelitis  Assessment & Plan  Status post transmetatarsal amputation with Podiatry  Patient no monitor off antibiotics Infectious Disease input noted  Podiatry input noted patient is noncompliant with nonweightbearing status in this was discussed with the patient and strongly recommended nonweightbearing status for proper healing  Physical therapy  -pending placement to [facility]-unfortunately delayed currently secondary to insurance issues              VTE Pharmacologic Prophylaxis:   High Risk (Score >/= 5) - Pharmacological DVT Prophylaxis Ordered: warfarin (Coumadin)  Sequential Compression Devices Ordered  Patient Centered Rounds: I performed bedside rounds with nursing staff today  Discussions with Specialists or Other Care Team Provider: n/a    Education and Discussions with Family / Patient: Updated  (significant other) at bedside  Time Spent for Care: 30 minutes  More than 50% of total time spent on counseling and coordination of care as described above  Current Length of Stay: 19 day(s)  Current Patient Status: Inpatient   Certification Statement: The patient will continue to require additional inpatient hospital stay due to Pending placement to rehab  Discharge Plan: Anticipate discharge in 24-48 hrs to rehab facility  Code Status: Level 1 - Full Code    Subjective:   Patient denies any acute complaints, reports pain is well controlled, does have some significant anxiety over placement procedure    Objective:     Vitals:   Temp (24hrs), Av 1 °F (36 7 °C), Min:97 8 °F (36 6 °C), Max:98 3 °F (36 8 °C)    Temp:  [97 8 °F (36 6 °C)-98 3 °F (36 8 °C)] 97 8 °F (36 6 °C)  HR:  [] 70  Resp:  [18] 18  BP: (106-188)/() 140/64  SpO2:  [95 %] 95 %  Body mass index is 43 41 kg/m²  Input and Output Summary (last 24 hours): Intake/Output Summary (Last 24 hours) at 2022 1619  Last data filed at 2022 1100  Gross per 24 hour   Intake 660 ml   Output 4009 ml   Net -3349 ml       Physical Exam:   Physical Exam  Constitutional:       General: She is not in acute distress  Appearance: She is obese  She is not ill-appearing, toxic-appearing or diaphoretic  Eyes:      General: No scleral icterus  Cardiovascular:      Rate and Rhythm: Regular rhythm  Heart sounds: No murmur heard  No friction rub  No gallop  Pulmonary:      Effort: No respiratory distress  Breath sounds: No stridor  No wheezing, rhonchi or rales  Chest:      Chest wall: No tenderness     Abdominal: General: There is no distension  Palpations: There is no mass  Tenderness: There is no abdominal tenderness  There is no guarding or rebound  Hernia: No hernia is present  Musculoskeletal:         General: No tenderness, deformity or signs of injury  Left lower leg: No edema  Comments: Right foot wrapped clean dry intact   Skin:     Coloration: Skin is not jaundiced or pale  Findings: No bruising or erythema  Neurological:      Mental Status: She is oriented to person, place, and time  Additional Data:     Labs:  Results from last 7 days   Lab Units 01/06/22  1649 01/06/22  0759 01/05/22  0653   WBC Thousand/uL 6 60   < > 6 49   HEMOGLOBIN g/dL 7 7*   < > 6 5*   HEMATOCRIT % 23 1*   < > 20 1*   PLATELETS Thousands/uL 143*   < > 170   NEUTROS PCT %  --   --  64   LYMPHS PCT %  --   --  18   MONOS PCT %  --   --  10   EOS PCT %  --   --  6    < > = values in this interval not displayed  Results from last 7 days   Lab Units 01/06/22  0759   SODIUM mmol/L 134*   POTASSIUM mmol/L 4 9   CHLORIDE mmol/L 100   CO2 mmol/L 25   BUN mg/dL 72*   CREATININE mg/dL 7 29*   ANION GAP mmol/L 9   CALCIUM mg/dL 8 7   GLUCOSE RANDOM mg/dL 132     Results from last 7 days   Lab Units 01/06/22  0759   INR  1 94*     Results from last 7 days   Lab Units 01/08/22  1224 01/07/22  2054 01/07/22  1638 01/07/22  1122 01/07/22  0919 01/06/22  1656 01/06/22  1407 01/05/22  2111 01/05/22  1540 01/05/22  1150 01/04/22  2034 01/04/22  1649   POC GLUCOSE mg/dl 90 182* 87 124 87 146* 242* 188* 143* 116 126 181*               Lines/Drains:  Invasive Devices  Report    Peripheral Intravenous Line            Peripheral IV 01/06/22 Left Antecubital 1 day          Line            Hemodialysis AV Fistula 02/20/18 Left Forearm 1418 days                      Imaging: No pertinent imaging reviewed      Recent Cultures (last 7 days):         Last 24 Hours Medication List:   Current Facility-Administered Medications Medication Dose Route Frequency Provider Last Rate    acetaminophen  650 mg Oral Q6H PRN Sherra Favors, DPM      albuterol  2 puff Inhalation Q6H PRN Sherra Favors, DPM      ALPRAZolam  0 25 mg Oral 4x Daily PRN Sherra Favors, DPM      ammonium lactate   Topical BID PRN Sherra Favors, DPM      calcium carbonate  500 mg Oral TID PRN Roxanne Abdullahi MD      carvedilol  25 mg Oral BID With Meals Sherra Favors, DPM      cinacalcet  30 mg Oral Daily Sherra Favors, [place]      Diclofenac Sodium  2 g Topical 4x Daily Roxanne Abdullahi MD      fentaNYL  25 mcg Intravenous Q5 Min PRN Mariangel Romo CRNA      gabapentin  100 mg Oral TID Sherra Favors, DPM      hydrALAZINE  5 mg Intravenous Q6H PRN Sherra Favors, DPM      insulin glargine  20 Units Subcutaneous HS Sherra Favors, DPM      insulin lispro  1-5 Units Subcutaneous TID [facility] Sherra Favors, DPM      insulin lispro  3 Units Subcutaneous TID With Meals Sherra Favors, DPM      levothyroxine  50 mcg Oral Daily Sherra Favors, DPM      lidocaine  1 patch Topical Daily Roxanne Abdullahi MD      melatonin  3 mg Oral HS Sherra Favors, DPM      metoclopramide  10 mg Oral Q6H PRN Deb King MD      neomycin-bacitracin-polymyxin b  1 small application Topical BID Ferdinand Banai, DO      NIFEdipine  30 mg Oral Daily Sherra Favors, DPM      nystatin   Topical BID Sherra Favors, DPM      ondansetron  4 mg Oral Q6H PRN Deb King MD      oxyCODONE  10 mg Oral Q4H PRN Ferdinand Banai, DO      Or    oxyCODONE  5 mg Oral Q4H PRN Ferdinand Banai, DO      pantoprazole  40 mg Oral Early Morning Sherra Favors, DPM      polyethylene glycol  17 g Oral Daily PRN Sherra Favors, DPM      senna  2 tablet Oral HS PRN Sherra Favors, DPM      sertraline  75 mg Oral Daily Sherra Favors, [place]      sevelamer  1,600 mg Oral TID With Meals Sherra Favors, DPM      torsemide  100 mg Oral Daily Sherra Favors, DPM      warfarin  9 mg Oral Once per day on Mon Tue Wed Thu Fri Sat Serge Tavares DO Today, Patient Was Seen By: Johnathan Luna DO    **Please Note: This note may have been constructed using a voice recognition system  **
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
## 2022-01-08 NOTE — PLAN OF CARE
Problem: Potential for Falls  Goal: Patient will remain free of falls  Description: INTERVENTIONS:  - Educate patient/family on patient safety including physical limitations  - Instruct patient to call for assistance with activity   - Consult OT/PT to assist with strengthening/mobility   - Keep Call bell within reach  - Keep bed low and locked with side rails adjusted as appropriate  - Keep care items and personal belongings within reach  - Initiate and maintain comfort rounds  - Make Fall Risk Sign visible to staff  - Offer Toileting every 2 Hours, in advance of need  - Initiate/Maintain alarm  - Obtain necessary fall risk management equipment:   - Apply yellow socks and bracelet for high fall risk patients  - Consider moving patient to room near nurses station  Outcome: Progressing     Problem: METABOLIC, FLUID AND ELECTROLYTES - ADULT  Goal: Electrolytes maintained within normal limits  Description: INTERVENTIONS:  - Monitor labs and assess patient for signs and symptoms of electrolyte imbalances  - Administer electrolyte replacement as ordered  - Monitor response to electrolyte replacements, including repeat lab results as appropriate  - Instruct patient on fluid and nutrition as appropriate  Outcome: Progressing

## 2022-01-08 NOTE — ASSESSMENT & PLAN NOTE
Monitor hemoglobin, has been stable around 7-8 for the past several days    -patient was transfused 1 unit packed red blood cells two days ago with appropriate response in hemoglobin  -no signs of blood loss

## 2022-01-09 LAB
ANION GAP SERPL CALCULATED.3IONS-SCNC: 5 MMOL/L (ref 4–13)
BASOPHILS # BLD AUTO: 0.04 THOUSANDS/ΜL (ref 0–0.1)
BASOPHILS NFR BLD AUTO: 1 % (ref 0–1)
BUN SERPL-MCNC: 58 MG/DL (ref 5–25)
CALCIUM SERPL-MCNC: 8.8 MG/DL (ref 8.3–10.1)
CHLORIDE SERPL-SCNC: 103 MMOL/L (ref 100–108)
CO2 SERPL-SCNC: 29 MMOL/L (ref 21–32)
CREAT SERPL-MCNC: 5.47 MG/DL (ref 0.6–1.3)
EOSINOPHIL # BLD AUTO: 0.39 THOUSAND/ΜL (ref 0–0.61)
EOSINOPHIL NFR BLD AUTO: 6 % (ref 0–6)
ERYTHROCYTE [DISTWIDTH] IN BLOOD BY AUTOMATED COUNT: 16.3 % (ref 11.6–15.1)
GFR SERPL CREATININE-BSD FRML MDRD: 8 ML/MIN/1.73SQ M
GLUCOSE SERPL-MCNC: 111 MG/DL (ref 65–140)
GLUCOSE SERPL-MCNC: 135 MG/DL (ref 65–140)
GLUCOSE SERPL-MCNC: 53 MG/DL (ref 65–140)
GLUCOSE SERPL-MCNC: 55 MG/DL (ref 65–140)
GLUCOSE SERPL-MCNC: 82 MG/DL (ref 65–140)
GLUCOSE SERPL-MCNC: 89 MG/DL (ref 65–140)
GLUCOSE SERPL-MCNC: 91 MG/DL (ref 65–140)
HCT VFR BLD AUTO: 23.7 % (ref 34.8–46.1)
HGB BLD-MCNC: 7.7 G/DL (ref 11.5–15.4)
IMM GRANULOCYTES # BLD AUTO: 0.04 THOUSAND/UL (ref 0–0.2)
IMM GRANULOCYTES NFR BLD AUTO: 1 % (ref 0–2)
INR PPP: 1.97 (ref 0.84–1.19)
LYMPHOCYTES # BLD AUTO: 1.07 THOUSANDS/ΜL (ref 0.6–4.47)
LYMPHOCYTES NFR BLD AUTO: 16 % (ref 14–44)
MCH RBC QN AUTO: 30.7 PG (ref 26.8–34.3)
MCHC RBC AUTO-ENTMCNC: 32.5 G/DL (ref 31.4–37.4)
MCV RBC AUTO: 94 FL (ref 82–98)
MONOCYTES # BLD AUTO: 0.61 THOUSAND/ΜL (ref 0.17–1.22)
MONOCYTES NFR BLD AUTO: 9 % (ref 4–12)
NEUTROPHILS # BLD AUTO: 4.45 THOUSANDS/ΜL (ref 1.85–7.62)
NEUTS SEG NFR BLD AUTO: 67 % (ref 43–75)
NRBC BLD AUTO-RTO: 0 /100 WBCS
PLATELET # BLD AUTO: 188 THOUSANDS/UL (ref 149–390)
PMV BLD AUTO: 10.1 FL (ref 8.9–12.7)
POTASSIUM SERPL-SCNC: 4.9 MMOL/L (ref 3.5–5.3)
PROTHROMBIN TIME: 21.5 SECONDS (ref 11.6–14.5)
RBC # BLD AUTO: 2.51 MILLION/UL (ref 3.81–5.12)
SODIUM SERPL-SCNC: 137 MMOL/L (ref 136–145)
WBC # BLD AUTO: 6.6 THOUSAND/UL (ref 4.31–10.16)

## 2022-01-09 PROCEDURE — 82948 REAGENT STRIP/BLOOD GLUCOSE: CPT

## 2022-01-09 PROCEDURE — 99232 SBSQ HOSP IP/OBS MODERATE 35: CPT | Performed by: INTERNAL MEDICINE

## 2022-01-09 PROCEDURE — 99024 POSTOP FOLLOW-UP VISIT: CPT | Performed by: PODIATRIST

## 2022-01-09 PROCEDURE — 85610 PROTHROMBIN TIME: CPT | Performed by: INTERNAL MEDICINE

## 2022-01-09 PROCEDURE — 85025 COMPLETE CBC W/AUTO DIFF WBC: CPT | Performed by: INTERNAL MEDICINE

## 2022-01-09 PROCEDURE — 80048 BASIC METABOLIC PNL TOTAL CA: CPT | Performed by: INTERNAL MEDICINE

## 2022-01-09 RX ORDER — POLYETHYLENE GLYCOL 3350 17 G/17G
17 POWDER, FOR SOLUTION ORAL DAILY
Status: DISCONTINUED | OUTPATIENT
Start: 2022-01-09 | End: 2022-01-11

## 2022-01-09 RX ORDER — OXYCODONE HYDROCHLORIDE 5 MG/1
2.5 TABLET ORAL EVERY 4 HOURS PRN
Status: DISCONTINUED | OUTPATIENT
Start: 2022-01-09 | End: 2022-01-12 | Stop reason: HOSPADM

## 2022-01-09 RX ORDER — OXYCODONE HYDROCHLORIDE 5 MG/1
5 TABLET ORAL EVERY 4 HOURS PRN
Status: DISCONTINUED | OUTPATIENT
Start: 2022-01-09 | End: 2022-01-12 | Stop reason: HOSPADM

## 2022-01-09 RX ADMIN — DICLOFENAC SODIUM 2 G: 10 GEL TOPICAL at 18:00

## 2022-01-09 RX ADMIN — CINACALCET 30 MG: 30 TABLET, FILM COATED ORAL at 15:57

## 2022-01-09 RX ADMIN — INSULIN LISPRO 3 UNITS: 100 INJECTION, SOLUTION INTRAVENOUS; SUBCUTANEOUS at 18:01

## 2022-01-09 RX ADMIN — GABAPENTIN 100 MG: 100 CAPSULE ORAL at 21:18

## 2022-01-09 RX ADMIN — NYSTATIN: 100000 POWDER TOPICAL at 08:35

## 2022-01-09 RX ADMIN — INSULIN LISPRO 3 UNITS: 100 INJECTION, SOLUTION INTRAVENOUS; SUBCUTANEOUS at 12:12

## 2022-01-09 RX ADMIN — CARVEDILOL 25 MG: 25 TABLET, FILM COATED ORAL at 15:55

## 2022-01-09 RX ADMIN — GABAPENTIN 100 MG: 100 CAPSULE ORAL at 08:32

## 2022-01-09 RX ADMIN — SEVELAMER HYDROCHLORIDE 1600 MG: 800 TABLET, FILM COATED PARENTERAL at 08:32

## 2022-01-09 RX ADMIN — OXYCODONE HYDROCHLORIDE 5 MG: 5 TABLET ORAL at 21:22

## 2022-01-09 RX ADMIN — PANTOPRAZOLE SODIUM 40 MG: 40 TABLET, DELAYED RELEASE ORAL at 05:25

## 2022-01-09 RX ADMIN — DICLOFENAC SODIUM 2 G: 10 GEL TOPICAL at 08:36

## 2022-01-09 RX ADMIN — SERTRALINE HYDROCHLORIDE 75 MG: 50 TABLET ORAL at 08:32

## 2022-01-09 RX ADMIN — BACITRACIN ZINC, NEOMYCIN, POLYMYXIN B 1 SMALL APPLICATION: 400; 3.5; 5 OINTMENT TOPICAL at 08:35

## 2022-01-09 RX ADMIN — OXYCODONE HYDROCHLORIDE 10 MG: 10 TABLET ORAL at 15:55

## 2022-01-09 RX ADMIN — LIDOCAINE 5% 1 PATCH: 700 PATCH TOPICAL at 08:34

## 2022-01-09 RX ADMIN — DICLOFENAC SODIUM 2 G: 10 GEL TOPICAL at 12:15

## 2022-01-09 RX ADMIN — POLYETHYLENE GLYCOL 3350 17 G: 17 POWDER, FOR SOLUTION ORAL at 21:24

## 2022-01-09 RX ADMIN — SEVELAMER HYDROCHLORIDE 1600 MG: 800 TABLET, FILM COATED PARENTERAL at 15:55

## 2022-01-09 RX ADMIN — Medication 3 MG: at 21:17

## 2022-01-09 RX ADMIN — DICLOFENAC SODIUM 2 G: 10 GEL TOPICAL at 22:35

## 2022-01-09 RX ADMIN — LEVOTHYROXINE SODIUM 50 MCG: 50 TABLET ORAL at 08:32

## 2022-01-09 RX ADMIN — OXYCODONE HYDROCHLORIDE 10 MG: 10 TABLET ORAL at 05:25

## 2022-01-09 RX ADMIN — ALPRAZOLAM 0.25 MG: 0.25 TABLET ORAL at 21:22

## 2022-01-09 RX ADMIN — SEVELAMER HYDROCHLORIDE 1600 MG: 800 TABLET, FILM COATED PARENTERAL at 12:11

## 2022-01-09 RX ADMIN — GABAPENTIN 100 MG: 100 CAPSULE ORAL at 15:55

## 2022-01-09 NOTE — PHYSICIAN ADVISOR
Current patient class: Inpatient  The patient is currently on Hospital Day: 21      The patient was admitted to the hospital at  7:10 PM on 12/20/21 for the following diagnosis:  Foot pain [M79 673]  Foot ulcer ([address] ) [L97 509]     CMS OUTLIER STAY REVIEW    After review of the relevant documentation, labs, vital signs and test results, the patient is appropriate for CONTINUED INPATIENT ADMISSION  The patient continues to remain hospitalized receiving acute medical care  The patient has surpassed the expected duration of stay, however given the clinical condition, need for further acute care management, the patient is appropriate to remain in an inpatient status  The patient still being actively managed, and does have unresolved medical issues requiring further hospitalization  This review is conducted at 10 day intervals, to help satisfy the requirements for significant outlier stay review as per CMS  Given the current condition of this patient, the patient satisfies this review was determination for continued inpatient stay  Rationale is as follows: The patient is a 49 yrs old Female who presented to the ED at 12/20/2021  3:07 PM with a chief complaint of worsened foot pain and redness  Known hx of foot ulcer; concerns about compliance as an outpatient  Recommended admission for IV abx and to try to optimize medical therapy  ID involved along with podiatry  Xray showing findings concerning for acute on chronic osteo and amputation recommended  Elevated K delayed surgery  Pt also on HD chronically  DM morbid obesity    12/26  Foot amputation done    Pt non compliant with post op instructions for non weight bearing; Pain control post op    Insurance delaying transfer to rehab which is expected in the near future  Given the issues above, IP care is supported      The patients vitals on arrival were   ED Triage Vitals [12/20/21 1437]   Temperature Pulse Respirations Blood Pressure SpO2   98 3 °F (36 8 °C) 74 18 (!) 191/71 95 %      Temp Source Heart Rate Source Patient Position - Orthostatic VS BP Location FiO2 (%)   Oral Monitor Sitting Right arm --      Pain Score       8           Past Medical History:   Diagnosis Date    Abnormal uterine bleeding (AUB)     Anemia     Anxiety     Arthritis     Chronic kidney disease     Claustrophobia     Diabetes mellitus ([facility] )     Disease of thyroid gland     DVT (deep venous thrombosis) (HCC)     End stage kidney disease (HCC)     Foot ulcer due to secondary DM ([facility] )     Hemodialysis patient ([place] [address] )     Tues, Thurs, Sat    Hypertension     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[facility] )     right eye    Morbid obesity ([facility] )     Osteomyelitis of fifth toe of right foot ([facility] )     Panic attacks     Pulmonary embolism (HCC)     Reflux esophagitis     Thrombophlebitis of saphenous vein     Warfarin anticoagulation      Past Surgical History:   Procedure Laterality Date    AMPUTATION      r 4th toe    ARTERIOVENOUS GRAFT PLACEMENT      CATARACT EXTRACTION Bilateral     CERVICAL BIOPSY  W/ LOOP ELECTRODE EXCISION       SECTION      DIALYSIS FISTULA CREATION      DILATION AND CURETTAGE OF UTERUS      ENDOMETRIAL ABLATION W/ NOVASURE      EYE SURGERY      HYSTERECTOMY      @ age 57 (complete)    IR AV FISTULAGRAM/GRAFTOGRAM  10/11/2019    IR AV FISTULAGRAM/GRAFTOGRAM  2020    IR AV FISTULAGRAM/GRAFTOGRAM  2020    IR NON-TUNNELED CENTRAL LINE PLACEMENT  2021    IR NON-TUNNELED CENTRAL LINE PLACEMENT  10/4/2021    OOPHORECTOMY Bilateral     @ age 57    [facility] Right 2021    Procedure: AMPUTATION TRANSMETATARSAL (TMA);  Surgeon: Kasey Chow DPM;  Location: [facility];  Service: Podiatry    OR AMPUTATION TOE,MT-P JT Right 2020    Procedure: AMPUTATION TOE- 5th;  Surgeon: Kasey Chow DPM;  Location: [facility];  Service: Podiatry    OR COLONOSCOPY FLX DX W/COLLJ SPEC WHEN PFRMD N/A 3/13/2019    Procedure: COLONOSCOPY;  Surgeon: Kassidy Holt MD;  Location: [facility] GI [facility]; Service: Gastroenterology    FL ESOPHAGOGASTRODUODENOSCOPY TRANSORAL DIAGNOSTIC N/A 3/13/2019    Procedure: ESOPHAGOGASTRODUODENOSCOPY (EGD); Surgeon: Kassidy Holt MD;  Location: [facility];   Service: Gastroenterology    FL LAPAROSCOPY W TOT HYSTERECT UTERUS 250 GRAM OR LESS N/A 2/16/2016    Procedure: HYSTERECTOMY LAPAROSCOPIC TOTAL ([address]), with bilateral salpingectomy;  Surgeon: Vanessa Huitron DO;  Location: [facility];  Service: Gynecology    THROMBECTOMY / ARTERIOVENOUS GRAFT REVISION      TOE SURGERY Right     removal of the 4th toe    WOUND DEBRIDEMENT Right 10/8/2021    Procedure: R 1st MTPJ washout ;  Surgeon: Silvino Ryan DPM;  Location: [facility];  Service: Podiatry           Consults have been placed to:   IP CONSULT TO PODIATRY  IP CONSULT TO NEPHROLOGY  IP CONSULT TO INFECTIOUS DISEASES  IP CONSULT TO ACUTE PAIN SERVICE  IP CONSULT TO PHYSICAL MEDICINE REHAB    Vitals:    01/08/22 1500 01/08/22 2206 01/09/22 0827 01/09/22 1551   BP: 140/64 128/54 107/69 128/68   BP Location:  Right arm     Pulse: 70 70     Resp:  18     Temp: 97 8 °F (36 6 °C) 97 6 °F (36 4 °C)  98 1 °F (36 7 °C)   TempSrc: Oral Oral     SpO2: 95% 92%     Weight:       Height:           Most recent labs:    Recent Labs     01/09/22  0458 01/09/22  0459   WBC 6 60  --    HGB 7 7*  --    HCT 23 7*  --      --    K 4 9  --    CALCIUM 8 8  --    BUN 58*  --    CREATININE 5 47*  --    INR  --  1 97*       Scheduled Meds:  Current Facility-Administered Medications   Medication Dose Route Frequency Provider Last Rate    acetaminophen  650 mg Oral Q6H PRN Gail Goodwin DPM      albuterol  2 puff Inhalation Q6H PRN Gail Goodwin DPM      ALPRAZolam  0 25 mg Oral 4x Daily PRN Gail Goodwin DPM      ammonium lactate   Topical BID PRN Gail Goodwin DPM      calcium carbonate  500 mg Oral TID PRN Eufemia Lopez MD  carvedilol  25 mg Oral BID With Meals Chandu Oniel, DPM      cinacalcet  30 mg Oral Daily Chandu Oniel, [place]      Diclofenac Sodium  2 g Topical 4x Daily Alba Payne MD      fentaNYL  25 mcg Intravenous Q5 Min PRN Dillon Coto CRNA      gabapentin  100 mg Oral TID Chandu Oniel, DPM      hydrALAZINE  5 mg Intravenous Q6H PRN Chandu Oniel, DPM      insulin glargine  20 Units Subcutaneous HS Chandu Oniel, DPM      insulin lispro  1-5 Units Subcutaneous TID [facility] Chandu Oniel, DPM      insulin lispro  3 Units Subcutaneous TID With Meals Chandu Oniel, DPM      levothyroxine  50 mcg Oral Daily Chandu Oniel, DPM      lidocaine  1 patch Topical Daily Alba Payne MD      melatonin  3 mg Oral HS Chandu Oniel, DPM      metoclopramide  10 mg Oral Q6H PRN Sherren Pax, MD      neomycin-bacitracin-polymyxin b  1 small application Topical BID Ferdinand Dulce, DO      NIFEdipine  30 mg Oral Daily Chandu Oniel, DPM      nystatin   Topical BID Chandu Oniel, DPM      ondansetron  4 mg Oral Q6H PRN Sherren Pax, MD      oxyCODONE  2 5 mg Oral Q4H PRN Ferdinand Linhai, DO      Or    oxyCODONE  5 mg Oral Q4H PRN Ferdinand Dulce, DO      pantoprazole  40 mg Oral Early Morning Chandu Oniel, DPM      polyethylene glycol  17 g Oral Daily Ferdinand Cardona, DO      senna  2 tablet Oral HS PRN Chandu Oniel, DPM      sertraline  75 mg Oral Daily Chandu Oniel, [place]      sevelamer  1,600 mg Oral TID With Meals Chandu Oniel, DPM      torsemide  100 mg Oral Daily Chandu Oniel, DPM      warfarin  9 mg Oral Once per day on Mon Tue Wed Thu Fri Sat Ferdinand Cardona DO       Continuous Infusions:   PRN Meds:   acetaminophen    albuterol    ALPRAZolam    ammonium lactate    calcium carbonate    fentaNYL    hydrALAZINE    metoclopramide    ondansetron    oxyCODONE **OR** oxyCODONE    senna    Surgical procedures (if appropriate):  Procedure(s):  AMPUTATION TRANSMETATARSAL (TMA)

## 2022-01-09 NOTE — ASSESSMENT & PLAN NOTE
Warfarin for anticoagulation  Goal INR 2-3, PT INR around 2 this morning, continue 9 mg scheduled Monday through Saturday

## 2022-01-09 NOTE — QUICK NOTE
Patient's discharge was canceled due to insurance issues case management is following    May be going to [facility] and will need to be Monday Wednesday Friday so will plan on dialysis treatment tomorrow to get on schedule

## 2022-01-09 NOTE — ASSESSMENT & PLAN NOTE
Status post transmetatarsal amputation with Podiatry  Patient no monitor off antibiotics Infectious Disease input noted  Podiatry input noted patient is noncompliant with nonweightbearing status in this was discussed with the patient and strongly recommended nonweightbearing status for proper healing  Physical therapy  -pending placement to [facility]-unfortunately delayed currently secondary to insurance issues

## 2022-01-09 NOTE — UTILIZATION REVIEW
Continued Stay Review    Date: 1/9                         Current Patient Class: IP  Current Level of Care: MS    HPI:56 y o  female initially admitted on  12/20    Assessment/Plan: Essential hypertension  Assessment & Plan  Monitor blood pressures  Avoid hypotension     Insulin-dependent diabetes mellitus with neuropathy  Assessment & Plan        Lab Results   Component Value Date     HGBA1C 7 9 (H) 09/30/2021      Continue basal/prandial insulin w/ additional SSI coverage per Accu-Cheks  Carbohydrate restricted diet  Continue Gabapentin for neuropathy     Hyperlipidemia  Assessment & Plan  Continue Lipitor     Morbid obesity  Assessment & Plan  BMI of 43 41  Lifestyle/diet modifications     Anemia of chronic disease  Assessment & Plan  Monitor hemoglobin, has been stable around 7-8 for the past several days    -patient was transfused 1 unit packed red blood cells 3 days ago with appropriate response in hemoglobin  -no signs of blood loss     ESRD on hemodialysis  Assessment & Plan  Routine hemodialysis per nephrology  Continue Renagel/Sensipar supplementation     -INR this morning around 2, hgb stable from last transfusion around 7 7        History of venous thromboembolism  Assessment & Plan  Warfarin for anticoagulation  Goal INR 2-3, PT INR around 2 this morning, continue 9 mg scheduled Monday through Saturday     * Right foot ulceration with osteomyelitis  Assessment & Plan  Status post transmetatarsal amputation with Podiatry  Patient no monitor off antibiotics Infectious Disease input noted  Podiatry input noted patient is noncompliant with nonweightbearing status in this was discussed with the patient and strongly recommended nonweightbearing status for proper healing  Physical therapy  -pending placement to [facility]-unfortunately delayed currently secondary to insurance issues        Vital Signs:   01/09/22 15:51:52 98 1 °F (36 7 °C) -- -- 128/68 88 -- -- --   01/09/22 08:27:40 -- -- -- 107/69 82 -- -- --   01/09/22 0637 -- -- -- -- -- -- None (Room           Pertinent Labs/Diagnostic Results:   Results from last 7 days   Lab Units 01/08/22  0611 01/02/22  1810   SARS-COV-2  Negative Negative     Results from last 7 days   Lab Units 01/09/22  0458 01/06/22  1649 01/06/22  0759 01/05/22  0653 01/05/22  0653 01/04/22  0751 01/04/22  0751   WBC Thousand/uL 6 60 6 60 7 45   < > 6 49   < > 7 53   HEMOGLOBIN g/dL 7 7* 7 7* 7 0*  --  6 5*  --  7 1*   HEMATOCRIT % 23 7* 23 1* 21 8*  --  20 1*  --  21 5*   PLATELETS Thousands/uL 188 143* 199   < > 170   < > 204   NEUTROS ABS Thousands/µL 4 45  --   --   --  4 24  --  5 10    < > = values in this interval not displayed           Results from last 7 days   Lab Units 01/09/22  0458 01/06/22  0759 01/05/22  0658 01/04/22  0751   SODIUM mmol/L 137 134* 134* 130*   POTASSIUM mmol/L 4 9 4 9 4 7 5 5*   CHLORIDE mmol/L 103 100 102 98*   CO2 mmol/L 29 25 27 25   ANION GAP mmol/L 5 9 5 7   BUN mg/dL 58* 72* 57* 77*   CREATININE mg/dL 5 47* 7 29* 5 44* 7 07*   EGFR ml/min/1 73sq m 8 5 8 6   CALCIUM mg/dL 8 8 8 7 8 5 8 6         Results from last 7 days   Lab Units 01/09/22  1613 01/09/22  1145 01/09/22  0839 01/09/22  0822 01/09/22  0820 01/08/22  2008 01/08/22  1626 01/08/22  1224 01/07/22  2054 01/07/22  1638 01/07/22  1122 01/07/22  0919   POC GLUCOSE mg/dl 135 111 89 53* 55* 142* 132 90 182* 87 124 87     Results from last 7 days   Lab Units 01/09/22  0458 01/06/22  0759 01/05/22  0658 01/04/22  0751   GLUCOSE RANDOM mg/dL 82 132 78 158*     Results from last 7 days   Lab Units 01/09/22  0459 01/06/22  0759 01/04/22  0751   PROTIME seconds 21 5* 21 2* 22 7*   INR  1 97* 1 94* 2 11*     Results from last 7 days   Lab Units 01/06/22  0759   HEP B S AG  Non-reactive   HEP B C TOTAL AB  Non-reactive       Results from last 7 days   Lab Units 01/08/22  0611 01/02/22  1810   INFLUENZA A PCR  Negative Negative   INFLUENZA B PCR  Negative Negative   RSV PCR  Negative Negative Medications:   Scheduled Medications:  carvedilol, 25 mg, Oral, BID With Meals  cinacalcet, 30 mg, Oral, Daily  Diclofenac Sodium, 2 g, Topical, 4x Daily  gabapentin, 100 mg, Oral, TID  insulin glargine, 20 Units, Subcutaneous, HS  insulin lispro, 1-5 Units, Subcutaneous, TID AC  insulin lispro, 3 Units, Subcutaneous, TID With Meals  levothyroxine, 50 mcg, Oral, Daily  lidocaine, 1 patch, Topical, Daily  melatonin, 3 mg, Oral, HS  neomycin-bacitracin-polymyxin b, 1 small application, Topical, BID  NIFEdipine, 30 mg, Oral, Daily  nystatin, , Topical, BID  pantoprazole, 40 mg, Oral, Early Morning  sertraline, 75 mg, Oral, Daily  sevelamer, 1,600 mg, Oral, TID With Meals  torsemide, 100 mg, Oral, Daily  warfarin, 9 mg, Oral, Once per day on Mon Tue Wed Thu Fri Sat      Continuous IV Infusions:     PRN Meds:  acetaminophen, 650 mg, Oral, Q6H PRN  albuterol, 2 puff, Inhalation, Q6H PRN  ALPRAZolam, 0 25 mg, Oral, 4x Daily PRN  ammonium lactate, , Topical, BID PRN  calcium carbonate, 500 mg, Oral, TID PRN  fentaNYL, 25 mcg, Intravenous, Q5 Min PRN  hydrALAZINE, 5 mg, Intravenous, Q6H PRN  metoclopramide, 10 mg, Oral, Q6H PRN  ondansetron, 4 mg, Oral, Q6H PRN  oxyCODONE, 10 mg, Oral, Q4H PRN   Or  oxyCODONE, 5 mg, Oral, Q4H PRN  polyethylene glycol, 17 g, Oral, Daily PRN  senna, 2 tablet, Oral, HS PRN        Discharge Plan: D    Network Utilization Review Department  ATTENTION: Please call with any questions or concerns to [phone] and carefully listen to the prompts so that you are directed to the right person  All voicemails are confidential   Twan Quiñonez all requests for admission clinical reviews, approved or denied determinations and any other requests to dedicated fax number below belonging to the campus where the patient is receiving treatment   List of dedicated fax numbers for the Facilities:  [address] DENIALS (Administrative/Medical Necessity) [phone]   [address] [facility] [address] (Maternity/NICU/Pediatrics) 261 [facility] 40 125 [facility]  [phone]   Toro Plunkett 50 [address] [id number] [place] [address] [address] Ekaterina Coyle [address]81 [address] [facility] [address] [facility] [phone]

## 2022-01-09 NOTE — PROGRESS NOTES
[facility]  Progress Note Kayleigh Patel 1965, 49 y o  female MRN: [id number]  Unit/Bed#: [facility] 933-01 Encounter: [id number]  Primary Care Provider: Amber Jackson MD   Date and time admitted to hospital: 12/20/2021  3:07 PM    Essential hypertension  Assessment & Plan  Monitor blood pressures  Avoid hypotension    Insulin-dependent diabetes mellitus with neuropathy  Assessment & Plan  Lab Results   Component Value Date    HGBA1C 7 9 (H) 09/30/2021     Continue basal/prandial insulin w/ additional SSI coverage per Accu-Cheks  Carbohydrate restricted diet  Continue Gabapentin for neuropathy    Hyperlipidemia  Assessment & Plan  Continue Lipitor    Morbid obesity  Assessment & Plan  BMI of 43 41  Lifestyle/diet modifications    Anemia of chronic disease  Assessment & Plan  Monitor hemoglobin, has been stable around 7-8 for the past several days    -patient was transfused 1 unit packed red blood cells 3 days ago with appropriate response in hemoglobin  -no signs of blood loss    ESRD on hemodialysis  Assessment & Plan  Routine hemodialysis per nephrology  Continue Renagel/Sensipar supplementation    -INR this morning around 2, hgb stable from last transfusion around 7 7      History of venous thromboembolism  Assessment & Plan  Warfarin for anticoagulation  Goal INR 2-3, PT INR around 2 this morning, continue 9 mg scheduled Monday through Saturday    * Right foot ulceration with osteomyelitis  Assessment & Plan  Status post transmetatarsal amputation with Podiatry  Patient no monitor off antibiotics Infectious Disease input noted  Podiatry input noted patient is noncompliant with nonweightbearing status in this was discussed with the patient and strongly recommended nonweightbearing status for proper healing  Physical therapy  -pending placement to [facility]-unfortunately delayed currently secondary to insurance issues              VTE Pharmacologic Prophylaxis:   High Risk (Score >/= 5) - Pharmacological DVT Prophylaxis Ordered: warfarin (Coumadin)  Sequential Compression Devices Ordered  Patient Centered Rounds: I performed bedside rounds with nursing staff today  Discussions with Specialists or Other Care Team Provider: n/a    Education and Discussions with Family / Patient: Updated  (significant other) at bedside  Time Spent for Care: 30 minutes  More than 50% of total time spent on counseling and coordination of care as described above  Current Length of Stay: 20 day(s)  Current Patient Status: Inpatient   Certification Statement: The patient will continue to require additional inpatient hospital stay due to Pending placement to rehab  Discharge Plan: Anticipate discharge tomorrow to rehab facility  Code Status: Level 1 - Full Code    Subjective:   Patient denies any acute complaints    Objective:     Vitals:   Temp (24hrs), Av 6 °F (36 4 °C), Min:97 6 °F (36 4 °C), Max:97 6 °F (36 4 °C)    Temp:  [97 6 °F (36 4 °C)] 97 6 °F (36 4 °C)  HR:  [70] 70  Resp:  [18] 18  BP: (107-128)/(54-69) 107/69  SpO2:  [92 %] 92 %  Body mass index is 43 41 kg/m²  Input and Output Summary (last 24 hours): Intake/Output Summary (Last 24 hours) at 2022 1540  Last data filed at 2022 1301  Gross per 24 hour   Intake 960 ml   Output --   Net 960 ml       Physical Exam:   Physical Exam  Constitutional:       General: She is not in acute distress  Appearance: She is obese  She is not ill-appearing, toxic-appearing or diaphoretic  Eyes:      General: No scleral icterus  Cardiovascular:      Rate and Rhythm: Regular rhythm  Heart sounds: No murmur heard  No friction rub  No gallop  Pulmonary:      Effort: No respiratory distress  Breath sounds: No stridor  No wheezing, rhonchi or rales  Chest:      Chest wall: No tenderness  Abdominal:      General: There is no distension  Palpations: There is no mass  Tenderness:  There is no abdominal tenderness  There is no guarding or rebound  Hernia: No hernia is present  Musculoskeletal:         General: No tenderness, deformity or signs of injury  Left lower leg: No edema  Comments: Right foot wrapped clean dry intact   Skin:     Coloration: Skin is not jaundiced or pale  Findings: No bruising or erythema  Neurological:      Mental Status: She is oriented to person, place, and time  Additional Data:     Labs:  Results from last 7 days   Lab Units 01/09/22  0458   WBC Thousand/uL 6 60   HEMOGLOBIN g/dL 7 7*   HEMATOCRIT % 23 7*   PLATELETS Thousands/uL 188   NEUTROS PCT % 67   LYMPHS PCT % 16   MONOS PCT % 9   EOS PCT % 6     Results from last 7 days   Lab Units 01/09/22  0458   SODIUM mmol/L 137   POTASSIUM mmol/L 4 9   CHLORIDE mmol/L 103   CO2 mmol/L 29   BUN mg/dL 58*   CREATININE mg/dL 5 47*   ANION GAP mmol/L 5   CALCIUM mg/dL 8 8   GLUCOSE RANDOM mg/dL 82     Results from last 7 days   Lab Units 01/09/22  0459   INR  1 97*     Results from last 7 days   Lab Units 01/09/22  1145 01/09/22  0839 01/09/22  0822 01/09/22  0820 01/08/22  2008 01/08/22  1626 01/08/22  1224 01/07/22  2054 01/07/22  1638 01/07/22  1122 01/07/22  0919 01/06/22  1656   POC GLUCOSE mg/dl 111 89 53* 55* 142* 132 90 182* 87 124 87 146*               Lines/Drains:  Invasive Devices  Report    Line            Hemodialysis AV Fistula 02/20/18 Left Forearm 1418 days                      Imaging: No pertinent imaging reviewed      Recent Cultures (last 7 days):         Last 24 Hours Medication List:   Current Facility-Administered Medications   Medication Dose Route Frequency Provider Last Rate    acetaminophen  650 mg Oral Q6H PRN Jose Angel Nelson, DPM      albuterol  2 puff Inhalation Q6H PRN Jose Angel Nelson, DPM      ALPRAZolam  0 25 mg Oral 4x Daily PRN Jose Angel Nelson, DPM      ammonium lactate   Topical BID PRN Jose Angel Nelson, DPM      calcium carbonate  500 mg Oral TID PRN Dutch Akers MD  carvedilol  25 mg Oral BID With Meals Moise Mims DPM      cinacalcet  30 mg Oral Daily [facility]      Diclofenac Sodium  2 g Topical 4x Daily Leif Vickers MD      fentaNYL  25 mcg Intravenous Q5 Min PRN Lizzette Alberto CRNA      gabapentin  100 mg Oral TID Moise Mims DPM      hydrALAZINE  5 mg Intravenous Q6H PRN Moise Mims DPM      insulin glargine  20 Units Subcutaneous HS Moise Mims DPM      insulin lispro  1-5 Units Subcutaneous TID [facility] Moise Mims DPM      insulin lispro  3 Units Subcutaneous TID With Meals Moise Mims DPM      levothyroxine  50 mcg Oral Daily Moise Mims DPM      lidocaine  1 patch Topical Daily Leif Vickers MD      melatonin  3 mg Oral HS Moise Mims DPM      metoclopramide  10 mg Oral Q6H PRN Aiden Davis MD      neomycin-bacitracin-polymyxin b  1 small application Topical BID Ferdinand Cardona DO      NIFEdipine  30 mg Oral Daily Moise Mims DPM      nystatin   Topical BID Moise Mims DPM      ondansetron  4 mg Oral Q6H PRN Aiden Davis MD      oxyCODONE  10 mg Oral Q4H PRN Ferdinand Banai, DO      Or    oxyCODONE  5 mg Oral Q4H PRN Ferdinand Banai, DO      pantoprazole  40 mg Oral Early Morning Moise Mims DPM      polyethylene glycol  17 g Oral Daily PRN Moise Mims DPM      senna  2 tablet Oral HS PRN Moise Mims DPM      sertraline  75 mg Oral Daily Elvira Davis      sevelamer  1,600 mg Oral TID With Meals Moise Mims DPM      torsemide  100 mg Oral Daily Moise Mims DPM      warfarin  9 mg Oral Once per day on Mon Tue Wed Thu Fri Sat Bailey Johnson DO          Today, Patient Was Seen By: Bailey Johnson DO    **Please Note: This note may have been constructed using a voice recognition system  **

## 2022-01-09 NOTE — PLAN OF CARE
Problem: METABOLIC, FLUID AND ELECTROLYTES - ADULT  Goal: Electrolytes maintained within normal limits  Description: INTERVENTIONS:  - Monitor labs and assess patient for signs and symptoms of electrolyte imbalances  - Administer electrolyte replacement as ordered  - Monitor response to electrolyte replacements, including repeat lab results as appropriate  - Instruct patient on fluid and nutrition as appropriate  Outcome: Progressing     Problem: METABOLIC, FLUID AND ELECTROLYTES - ADULT  Goal: Fluid balance maintained  Description: INTERVENTIONS:  - Monitor labs   - Monitor I/O and WT  - Instruct patient on fluid and nutrition as appropriate  - Assess for signs & symptoms of volume excess or deficit  Outcome: Progressing

## 2022-01-09 NOTE — ASSESSMENT & PLAN NOTE
Routine hemodialysis per nephrology  Continue Renagel/Sensipar supplementation    -INR this morning around 2, hgb stable from last transfusion around 7 7

## 2022-01-09 NOTE — PROGRESS NOTES
NEPHROLOGY PROGRESS NOTE   Kat Shepherd 49 y o  female MRN: [id number]  Unit/Bed#: [facility] 933-01 Encounter: [id number]      ASSESSMENT & PLAN    31-year-old female with past medical history of end-stage kidney disease on hemodialysis, hypertension, type 2 diabetes, GERD, obesity, DVT you initially presented with lower extremity foot wound, with concerns for osteomyelitis     1  End-stage kidney disease on hemodialysis-Tuesday Thursday Saturday it had [facility] [address]-access left upper extremity AV fistula   -discharge was scheduled for yesterday however insurance issues continue a and patient is considering going home versus [facility]  -if she goes to [facility] she will be Monday Wednesday Friday so will plan on dialysis tomorrow     2  Volume overload-dry weight is elevated,-aggressive ultrafiltration plan     3  Osteomyelitis-per primary team-status post right TMA-ongoing rehab      4  Anemia of chronic kidney disease-no MITCHELL due to history of PE not a candidate for IV iron as elevated ferritin monitor hemoglobin     5  Hypertension-monitor current regimen     6   CKD bone mineral disease-on phosphorus binders, on Sensipar     SUBJECTIVE:  Patient was seen today ambulating without difficulty  No chest pain or shortness of breath overnight  She was ready to be discharged however this is now on hold because of some insurance issues case management is following    OBJECTIVE:  Current Weight: Weight - Scale: 122 kg (268 lb 15 4 oz)  @  Vitals:    01/08/22 1247 01/08/22 1500 01/08/22 2206 01/09/22 0827   BP: 162/63 140/64 128/54 107/69   BP Location:   Right arm    Pulse: 75 70 70    Resp:   18    Temp:  97 8 °F (36 6 °C) 97 6 °F (36 4 °C)    TempSrc:  Oral Oral    SpO2:  95% 92%    Weight:       Height:           Intake/Output Summary (Last 24 hours) at 1/9/2022 1355  Last data filed at 1/9/2022 1301  Gross per 24 hour   Intake 960 ml   Output --   Net 960 ml     Weight (last 2 days)     None General: conscious, cooperative, in no acute distress  Eyes: conjunctivae pink, anicteric sclerae  ENT: lips and mucous membranes moist  Neck: supple, no JVD  Chest: no respiratory distress, no accessory muscle use, normal respiratory effort  CVS: normal heart rate, no friction rub  Abdomen:  Distended abdomen  Extremities:  Positive lower extremity edema  Skin: no rash  Neuro: awake, alert, oriented      Medications:    Current Facility-Administered Medications:     acetaminophen (TYLENOL) tablet 650 mg, 650 mg, Oral, Q6H PRN, [place] Mage, DPM, 650 mg at 12/28/21 1721    albuterol (PROVENTIL HFA,VENTOLIN HFA) inhaler 2 puff, 2 puff, Inhalation, Q6H PRN, Flor Mage, DPM    ALPRAZolam Christin Room) tablet 0 25 mg, 0 25 mg, Oral, 4x Daily PRN, Flor Mage, DPM, 0 25 mg at 01/08/22 1418    ammonium lactate (LAC-HYDRIN) 12 % cream, , Topical, BID PRN, Flor Mage, DPM, Given at 12/21/21 2207    calcium carbonate (TUMS) chewable tablet 500 mg, 500 mg, Oral, TID PRN, Cielo Montiel MD, 500 mg at 12/30/21 2343    carvedilol (COREG) tablet 25 mg, 25 mg, Oral, BID With Meals, [place] Mage, DPM, 25 mg at 01/08/22 1744    cinacalcet (SENSIPAR) tablet 30 mg, 30 mg, Oral, Daily, [place] Mage, DPM, 30 mg at 01/08/22 1744    Diclofenac Sodium (VOLTAREN) 1 % topical gel 2 g, 2 g, Topical, 4x Daily, Cielo Montiel MD, 2 g at 01/09/22 1215    fentaNYL (SUBLIMAZE) injection 25 mcg, 25 mcg, Intravenous, Q5 Min PRN, Carolyn Resendez CRNA    gabapentin (NEURONTIN) capsule 100 mg, 100 mg, Oral, TID, Flor Mage, DPM, 100 mg at 01/09/22 8337    hydrALAZINE (APRESOLINE) injection 5 mg, 5 mg, Intravenous, Q6H PRN, Flor Mage, DPM, 5 mg at 12/21/21 0047    insulin glargine (LANTUS) subcutaneous injection 20 Units 0 2 mL, 20 Units, Subcutaneous, HS, Flor Mage, DPM, 20 Units at 01/08/22 2245    insulin lispro (HumaLOG) 100 units/mL subcutaneous injection 1-5 Units, 1-5 Units, Subcutaneous, TID AC, 1 Units at 01/04/22 1843 **AND** Fingerstick Glucose (POCT), , , TID AC, Marivel Diaz DPM    insulin lispro (HumaLOG) 100 units/mL subcutaneous injection 3 Units, 3 Units, Subcutaneous, TID With Meals, Marivel Diaz DPM, 3 Units at 01/09/22 1212    levothyroxine tablet 50 mcg, 50 mcg, Oral, Daily, Marivel Diaz DPM, 50 mcg at 01/09/22 2690    lidocaine (LIDODERM) 5 % patch 1 patch, 1 patch, Topical, Daily, Rajiv Blount MD, 1 patch at 01/09/22 0834    melatonin tablet 3 mg, 3 mg, Oral, HS, Marivel Diaz DPM, 3 mg at 01/08/22 2245    metoclopramide (REGLAN) tablet 10 mg, 10 mg, Oral, Q6H PRN, Lupe Kelley MD    neomycin-bacitracin-polymyxin b (NEOSPORIN) ointment 1 small application, 1 small application, Topical, BID, Ferdinand Cardona DO, 1 small application at 80/94/11 0835    NIFEdipine (PROCARDIA XL) 24 hr tablet 30 mg, 30 mg, Oral, Daily, Marivel Diaz DPM, 30 mg at 01/08/22 1301    nystatin (MYCOSTATIN) powder, , Topical, BID, Marivel Diaz DPM, Given at 01/09/22 0835    ondansetron (ZOFRAN-ODT) dispersible tablet 4 mg, 4 mg, Oral, Q6H PRN, Lupe Kelley MD, 4 mg at 01/06/22 1949    oxyCODONE (ROXICODONE) IR tablet 5 mg, 5 mg, Oral, Q4H PRN **OR** oxyCODONE (ROXICODONE) immediate release tablet 10 mg, 10 mg, Oral, Q4H PRN, Ferdinand Cardona DO, 10 mg at 01/09/22 0525    pantoprazole (PROTONIX) EC tablet 40 mg, 40 mg, Oral, Early Morning, Marivel Diaz DPM, 40 mg at 01/09/22 0525    polyethylene glycol (MIRALAX) packet 17 g, 17 g, Oral, Daily PRN, Daleen Emily, DPM    senna (SENOKOT) tablet 17 2 mg, 2 tablet, Oral, HS PRN, Daleen Emily, DPM    sertraline (ZOLOFT) tablet 75 mg, 75 mg, Oral, Daily, Daleen Emily, DPM, 75 mg at 01/09/22 8057    sevelamer (RENAGEL) tablet 1,600 mg, 1,600 mg, Oral, TID With Meals, Dalrahul Emily, DPM, 1,600 mg at 01/09/22 1211    torsemide (DEMADEX) tablet 100 mg, 100 mg, Oral, Daily, Daleen Emily, DPM, 100 mg at 01/08/22 1253    warfarin (COUMADIN) tablet 9 mg, 9 mg, Oral, Once per day on Mon Tue Wed Thu Ferdinand Red DO, 9 mg at 01/08/22 1744    Invasive Devices:      Lab Results:   Results from last 7 days   Lab Units 01/09/22  0458 01/06/22  1649 01/06/22  0759 01/05/22  0658 01/05/22  0653 01/04/22  0751   WBC Thousand/uL 6 60 6 60 7 45  --  6 49 7 53   HEMOGLOBIN g/dL 7 7* 7 7* 7 0*  --  6 5* 7 1*   HEMATOCRIT % 23 7* 23 1* 21 8*  --  20 1* 21 5*   PLATELETS Thousands/uL 188 143* 199  --  170 204   POTASSIUM mmol/L 4 9  --  4 9 4 7  --  5 5*   CHLORIDE mmol/L 103  --  100 102  --  98*   CO2 mmol/L 29  --  25 27  --  25   BUN mg/dL 58*  --  72* 57*  --  77*   CREATININE mg/dL 5 47*  --  7 29* 5 44*  --  7 07*   CALCIUM mg/dL 8 8  --  8 7 8 5  --  8 6         Portions of the record may have been created with voice recognition software  Occasional wrong word or "sound a like" substitutions may have occurred due to the inherent limitations of voice recognition software  Read the chart carefully and recognize, using context, where substitutions have occurred  If you have any questions, please contact the dictating provider

## 2022-01-09 NOTE — ASSESSMENT & PLAN NOTE
Monitor hemoglobin, has been stable around 7-8 for the past several days    -patient was transfused 1 unit packed red blood cells 3 days ago with appropriate response in hemoglobin  -no signs of blood loss

## 2022-01-09 NOTE — PROGRESS NOTES
[facility] - Progress Note  Patient: Lynda Platt 49 y o  female   MRN: [id number]  PCP: Marissa Degroot MD  [facility] Encounter: [id number]  Date Of Visit: 22    ASSESSMENT:    Lynda Platt is a 49 y o  female with:    1  Right medial 1st MTPJ diabetic ulceration- Ol[place] 3 - POA              -s/p R TMA (DOS: 21)  2  T2DM  3  ESRD on HD  4  Obesity      PLAN:    · R TMA surgical site dressing changed today, no acute clinical signs of infection, all sutures intact  Patient remains stable for discharge to rehab  · Continue local wound care, xeroform, DSD, appreciate nursing assistance with dressing changes  · Elevation on green foam wedges or pillows when non-ambulatory  · Rest of care per primary team     Weight bearing status: NWB to RLE      SUBJECTIVE:     The patient was seen, evaluated, and assessed at bedside today  The patient was awake, alert, and in no acute distress  No acute events overnight  The patient reports she has been trying her best not walking on foot, however she does report putting her foot down at times for going to/from bathroom  She reports minimal pain at this time  Patient denies N/V/F/chills/SOB/CP  OBJECTIVE:     Vitals:   /69   Pulse 70   Temp 97 6 °F (36 4 °C) (Oral)   Resp 18   Ht 5' 6" (1 676 m)   Wt 122 kg (268 lb 15 4 oz)   LMP 2016 (Exact Date)   SpO2 92%   BMI 43 41 kg/m²     Temp (24hrs), Av 7 °F (36 5 °C), Min:97 6 °F (36 4 °C), Max:97 8 °F (36 6 °C)      Physical Exam:     General:  Alert, cooperative, and in no distress  Lower extremity exam:  Cardiovascular status at baseline  Neurological status at baseline  Musculoskeletal status at baseline  No calf tenderness noted  R TMA surgical site skin stable with skin edges well aligned and coapted, all sutures intact  No signs of active infection: no purulence, no malodor, no ascending erythema, no crepitus, no fluctuance   No expression of fluid upon compression  Capillary refill <3 seconds on skin surrounding surgical site  Skin temperature WNL  Clinical Images 01/09/22:              Additional Data:     Labs:    Results from last 7 days   Lab Units 01/09/22  0458   WBC Thousand/uL 6 60   HEMOGLOBIN g/dL 7 7*   HEMATOCRIT % 23 7*   PLATELETS Thousands/uL 188   NEUTROS PCT % 67   LYMPHS PCT % 16   MONOS PCT % 9   EOS PCT % 6     Results from last 7 days   Lab Units 01/09/22  0458   POTASSIUM mmol/L 4 9   CHLORIDE mmol/L 103   CO2 mmol/L 29   BUN mg/dL 58*   CREATININE mg/dL 5 47*   CALCIUM mg/dL 8 8     Results from last 7 days   Lab Units 01/09/22  0459   INR  1 97*       * I Have Reviewed All Lab Data Listed Above  Imaging: I have personally reviewed pertinent films in PACS  EKG, Pathology, and Other Studies: I have personally reviewed pertinent reports  ** Please Note: Portions of the record may have been created with voice recognition software  Occasional wrong word or "sound a like" substitutions may have occurred due to the inherent limitations of voice recognition software  Read the chart carefully and recognize, using context, where substitutions have occurred   **

## 2022-01-10 ENCOUNTER — APPOINTMENT (INPATIENT)
Dept: DIALYSIS | Facility: HOSPITAL | Age: 57
DRG: 617 | End: 2022-01-10
Payer: MEDICARE

## 2022-01-10 LAB
GLUCOSE SERPL-MCNC: 138 MG/DL (ref 65–140)
GLUCOSE SERPL-MCNC: 139 MG/DL (ref 65–140)
GLUCOSE SERPL-MCNC: 161 MG/DL (ref 65–140)
GLUCOSE SERPL-MCNC: 212 MG/DL (ref 65–140)
GLUCOSE SERPL-MCNC: 94 MG/DL (ref 65–140)
INR PPP: 1.87 (ref 0.84–1.19)
PROTHROMBIN TIME: 20.7 SECONDS (ref 11.6–14.5)

## 2022-01-10 PROCEDURE — 82948 REAGENT STRIP/BLOOD GLUCOSE: CPT

## 2022-01-10 PROCEDURE — 30233N1 TRANSFUSION OF NONAUTOLOGOUS RED BLOOD CELLS INTO PERIPHERAL VEIN, PERCUTANEOUS APPROACH: ICD-10-PCS | Performed by: INTERNAL MEDICINE

## 2022-01-10 PROCEDURE — 99233 SBSQ HOSP IP/OBS HIGH 50: CPT | Performed by: INTERNAL MEDICINE

## 2022-01-10 PROCEDURE — 85610 PROTHROMBIN TIME: CPT | Performed by: INTERNAL MEDICINE

## 2022-01-10 PROCEDURE — 90935 HEMODIALYSIS ONE EVALUATION: CPT | Performed by: INTERNAL MEDICINE

## 2022-01-10 RX ORDER — INSULIN GLARGINE 100 [IU]/ML
10 INJECTION, SOLUTION SUBCUTANEOUS
Status: DISCONTINUED | OUTPATIENT
Start: 2022-01-10 | End: 2022-01-12 | Stop reason: HOSPADM

## 2022-01-10 RX ADMIN — ALPRAZOLAM 0.25 MG: 0.25 TABLET ORAL at 08:24

## 2022-01-10 RX ADMIN — GABAPENTIN 100 MG: 100 CAPSULE ORAL at 16:08

## 2022-01-10 RX ADMIN — DICLOFENAC SODIUM 2 G: 10 GEL TOPICAL at 16:10

## 2022-01-10 RX ADMIN — Medication 3 MG: at 21:28

## 2022-01-10 RX ADMIN — OXYCODONE HYDROCHLORIDE 5 MG: 5 TABLET ORAL at 16:15

## 2022-01-10 RX ADMIN — SEVELAMER HYDROCHLORIDE 1600 MG: 800 TABLET, FILM COATED PARENTERAL at 13:08

## 2022-01-10 RX ADMIN — CINACALCET 30 MG: 30 TABLET, FILM COATED ORAL at 16:10

## 2022-01-10 RX ADMIN — PANTOPRAZOLE SODIUM 40 MG: 40 TABLET, DELAYED RELEASE ORAL at 05:51

## 2022-01-10 RX ADMIN — WARFARIN SODIUM 9 MG: 7.5 TABLET ORAL at 17:43

## 2022-01-10 RX ADMIN — DICLOFENAC SODIUM 2 G: 10 GEL TOPICAL at 21:29

## 2022-01-10 RX ADMIN — LEVOTHYROXINE SODIUM 50 MCG: 50 TABLET ORAL at 05:51

## 2022-01-10 RX ADMIN — INSULIN LISPRO 3 UNITS: 100 INJECTION, SOLUTION INTRAVENOUS; SUBCUTANEOUS at 16:10

## 2022-01-10 RX ADMIN — INSULIN LISPRO 1 UNITS: 100 INJECTION, SOLUTION INTRAVENOUS; SUBCUTANEOUS at 16:11

## 2022-01-10 RX ADMIN — CARVEDILOL 25 MG: 25 TABLET, FILM COATED ORAL at 16:08

## 2022-01-10 RX ADMIN — NIFEDIPINE 30 MG: 30 TABLET, FILM COATED, EXTENDED RELEASE ORAL at 13:10

## 2022-01-10 RX ADMIN — NYSTATIN: 100000 POWDER TOPICAL at 16:10

## 2022-01-10 RX ADMIN — SEVELAMER HYDROCHLORIDE 1600 MG: 800 TABLET, FILM COATED PARENTERAL at 16:08

## 2022-01-10 RX ADMIN — SERTRALINE HYDROCHLORIDE 75 MG: 50 TABLET ORAL at 13:09

## 2022-01-10 RX ADMIN — BACITRACIN ZINC, NEOMYCIN, POLYMYXIN B 1 SMALL APPLICATION: 400; 3.5; 5 OINTMENT TOPICAL at 16:08

## 2022-01-10 RX ADMIN — OXYCODONE HYDROCHLORIDE 5 MG: 5 TABLET ORAL at 05:51

## 2022-01-10 RX ADMIN — CALCIUM CARBONATE (ANTACID) CHEW TAB 500 MG 500 MG: 500 CHEW TAB at 00:44

## 2022-01-10 RX ADMIN — INSULIN LISPRO 3 UNITS: 100 INJECTION, SOLUTION INTRAVENOUS; SUBCUTANEOUS at 13:07

## 2022-01-10 RX ADMIN — TORSEMIDE 100 MG: 20 TABLET ORAL at 13:08

## 2022-01-10 RX ADMIN — GABAPENTIN 100 MG: 100 CAPSULE ORAL at 21:28

## 2022-01-10 RX ADMIN — INSULIN GLARGINE 10 UNITS: 100 INJECTION, SOLUTION SUBCUTANEOUS at 21:33

## 2022-01-10 RX ADMIN — ALPRAZOLAM 0.25 MG: 0.25 TABLET ORAL at 21:28

## 2022-01-10 RX ADMIN — OXYCODONE HYDROCHLORIDE 5 MG: 5 TABLET ORAL at 21:33

## 2022-01-10 NOTE — ASSESSMENT & PLAN NOTE
Lab Results   Component Value Date    HGBA1C 7 9 (H) 09/30/2021     Continue basal/prandial insulin w/ additional SSI coverage per Accu-Cheks  Decrease Lantus dose to 10 units due to mild hypoglycemia  Carbohydrate restricted diet  Continue Gabapentin for neuropathy

## 2022-01-10 NOTE — PLAN OF CARE
Problem: Potential for Falls  Goal: Patient will remain free of falls  Description: INTERVENTIONS:  - Educate patient/family on patient safety including physical limitations  - Instruct patient to call for assistance with activity   - Consult OT/PT to assist with strengthening/mobility   - Keep Call bell within reach  - Keep bed low and locked with side rails adjusted as appropriate  - Keep care items and personal belongings within reach  - Initiate and maintain comfort rounds  - Make Fall Risk Sign visible to staff  - Apply yellow socks and bracelet for high fall risk patients  - Consider moving patient to room near nurses station  Outcome: Progressing     Problem: METABOLIC, FLUID AND ELECTROLYTES - ADULT  Goal: Electrolytes maintained within normal limits  Description: INTERVENTIONS:  - Monitor labs and assess patient for signs and symptoms of electrolyte imbalances  - Administer electrolyte replacement as ordered  - Monitor response to electrolyte replacements, including repeat lab results as appropriate  - Instruct patient on fluid and nutrition as appropriate  Outcome: Progressing  Goal: Fluid balance maintained  Description: INTERVENTIONS:  - Monitor labs   - Monitor I/O and WT  - Instruct patient on fluid and nutrition as appropriate  - Assess for signs & symptoms of volume excess or deficit  Outcome: Progressing     Problem: PAIN - ADULT  Goal: Verbalizes/displays adequate comfort level or baseline comfort level  Description: Interventions:  - Encourage patient to monitor pain and request assistance  - Assess pain using appropriate pain scale  - Administer analgesics based on type and severity of pain and evaluate response  - Implement non-pharmacological measures as appropriate and evaluate response  - Consider cultural and social influences on pain and pain management  - Notify physician/advanced practitioner if interventions unsuccessful or patient reports new pain  Outcome: Progressing     Problem: INFECTION - ADULT  Goal: Absence or prevention of progression during hospitalization  Description: INTERVENTIONS:  - Assess and monitor for signs and symptoms of infection  - Monitor lab/diagnostic results  - Monitor all insertion sites, i e  indwelling lines, tubes, and drains  - Monitor endotracheal if appropriate and nasal secretions for changes in amount and color  - [place] appropriate cooling/warming therapies per order  - Administer medications as ordered  - Instruct and encourage patient and family to use good hand hygiene technique  - Identify and instruct in appropriate isolation precautions for identified infection/condition  Outcome: Progressing  Goal: Absence of fever/infection during neutropenic period  Description: INTERVENTIONS:  - Monitor WBC    Outcome: Progressing     Problem: SAFETY ADULT  Goal: Patient will remain free of falls  Description: INTERVENTIONS:  - Educate patient/family on patient safety including physical limitations  - Instruct patient to call for assistance with activity   - Consult OT/PT to assist with strengthening/mobility   - Keep Call bell within reach  - Keep bed low and locked with side rails adjusted as appropriate  - Keep care items and personal belongings within reach  - Initiate and maintain comfort rounds  - Make Fall Risk Sign visible to staff  - Apply yellow socks and bracelet for high fall risk patients  - Consider moving patient to room near nurses station  Outcome: Progressing  Goal: Maintain or return to baseline ADL function  Description: INTERVENTIONS:  -  Assess patient's ability to carry out ADLs; assess patient's baseline for ADL function and identify physical deficits which impact ability to perform ADLs (bathing, care of mouth/teeth, toileting, grooming, dressing, etc )  - Assess/evaluate cause of self-care deficits   - Assess range of motion  - Assess patient's mobility; develop plan if impaired  - Assess patient's need for assistive devices and provide as appropriate  - Encourage maximum independence but intervene and supervise when necessary  - Involve family in performance of ADLs  - Assess for home care needs following discharge   - Consider OT consult to assist with ADL evaluation and planning for discharge  - Provide patient education as appropriate  Outcome: Progressing  Goal: Maintains/Returns to pre admission functional level  Description: INTERVENTIONS:  - Perform BMAT or MOVE assessment daily    - Set and communicate daily mobility goal to care team and patient/family/caregiver  - Collaborate with rehabilitation services on mobility goals if consulted  - Out of bed for toileting  - Record patient progress and toleration of activity level   Outcome: Progressing     Problem: DISCHARGE PLANNING  Goal: Discharge to home or other facility with appropriate resources  Description: INTERVENTIONS:  - Identify barriers to discharge w/patient and caregiver  - Arrange for needed discharge resources and transportation as appropriate  - Identify discharge learning needs (meds, wound care, etc )  - Arrange for interpretive services to assist at discharge as needed  - Refer to Case Management Department for coordinating discharge planning if the patient needs post-hospital services based on physician/advanced practitioner order or complex needs related to functional status, cognitive ability, or social support system  Outcome: Progressing     Problem: Knowledge Deficit  Goal: Patient/family/caregiver demonstrates understanding of disease process, treatment plan, medications, and discharge instructions  Description: Complete learning assessment and assess knowledge base    Interventions:  - Provide teaching at level of understanding  - Provide teaching via preferred learning methods  Outcome: Progressing     Problem: MOBILITY - ADULT  Goal: Maintain or return to baseline ADL function  Description: INTERVENTIONS:  -  Assess patient's ability to carry out ADLs; assess patient's baseline for ADL function and identify physical deficits which impact ability to perform ADLs (bathing, care of mouth/teeth, toileting, grooming, dressing, etc )  - Assess/evaluate cause of self-care deficits   - Assess range of motion  - Assess patient's mobility; develop plan if impaired  - Assess patient's need for assistive devices and provide as appropriate  - Encourage maximum independence but intervene and supervise when necessary  - Involve family in performance of ADLs  - Assess for home care needs following discharge   - Consider OT consult to assist with ADL evaluation and planning for discharge  - Provide patient education as appropriate  Outcome: Progressing  Goal: Maintains/Returns to pre admission functional level  Description: INTERVENTIONS:  - Perform BMAT or MOVE assessment daily    - Set and communicate daily mobility goal to care team and patient/family/caregiver  - Collaborate with rehabilitation services on mobility goals if consulted  - Out of bed for toileting  - Record patient progress and toleration of activity level   Outcome: Progressing     Problem: Nutrition/Hydration-ADULT  Goal: Nutrient/Hydration intake appropriate for improving, restoring or maintaining nutritional needs  Description: Monitor and assess patient's nutrition/hydration status for malnutrition  Collaborate with interdisciplinary team and initiate plan and interventions as ordered  Monitor patient's weight and dietary intake as ordered or per policy  Utilize nutrition screening tool and intervene as necessary  Determine patient's food preferences and provide high-protein, high-caloric foods as appropriate       INTERVENTIONS:  - Monitor oral intake, urinary output, labs, and treatment plans  - Assess nutrition and hydration status and recommend course of action  - Evaluate amount of meals eaten  - Assist patient with eating if necessary   - Allow adequate time for meals  - Recommend/ encourage appropriate diets, oral nutritional supplements, and vitamin/mineral supplements  - Order, calculate, and assess calorie counts as needed  - Recommend, monitor, and adjust tube feedings and TPN/PPN based on assessed needs  - Assess need for intravenous fluids  - Provide specific nutrition/hydration education as appropriate  - Include patient/family/caregiver in decisions related to nutrition  Outcome: Progressing

## 2022-01-10 NOTE — PROGRESS NOTES
[facility]  Progress Note Tiny Section 1965, 49 y o  female MRN: [id number]  Unit/Bed#: [facility] 933-01 Encounter: [id number]  Primary Care Provider: Monica Pinedo MD   Date and time admitted to hospital: 12/20/2021  3:07 PM    * Right foot ulceration with osteomyelitis  Assessment & Plan  Status post transmetatarsal amputation with Podiatry on 12/26/ 21  Patient nwo monitor off antibiotics Infectious Disease input noted  Podiatry input noted patient is noncompliant with nonweightbearing status and this was discussed with the patient and strongly recommended nonweightbearing status for proper healing  Physical therapy  -pending placement to [facility]        History of venous thromboembolism  Assessment & Plan  Warfarin for anticoagulation  Goal INR 2-3, continue 9 mg scheduled Monday through Saturday    ESRD on hemodialysis  Assessment & Plan  Routine hemodialysis per nephrology MWF  Continue Renagel/Sensipar supplementation        Essential hypertension  Assessment & Plan  Monitor blood pressures  Avoid hypotension    Insulin-dependent diabetes mellitus with neuropathy  Assessment & Plan  Lab Results   Component Value Date    HGBA1C 7 9 (H) 09/30/2021     Continue basal/prandial insulin w/ additional SSI coverage per Accu-Cheks  Decrease Lantus dose to 10 units due to mild hypoglycemia  Carbohydrate restricted diet  Continue Gabapentin for neuropathy    Morbid obesity  Assessment & Plan  BMI of 43 41  Lifestyle/diet modifications    Anemia of chronic disease  Assessment & Plan  Monitor hemoglobin, has been stable around 7-8 for the past several days  -patient was transfused 1 unit packed red blood cells on 12/28/2021 and 1/5/2022 with appropriate response in hemoglobin  -no signs of blood loss      VTE Pharmacologic Prophylaxis:   Coumadin    Patient Centered Rounds: I performed bedside rounds with nursing staff today    Discussions with Specialists or Other Care Team Provider:       Time Spent for Care: 45 minutes  More than 50% of total time spent on counseling and coordination of care as described above  Current Length of Stay: 21 day(s)  Current Patient Status: Inpatient   Certification Statement: The patient will continue to require additional inpatient hospital stay due to Awaiting rehab placement  Discharge Plan: Awaiting rehab placement    Code Status: Level 1 - Full Code    Subjective:   Patient seen and examined  Comfortable in bed during hemodialysis  No Chest pain or shortness of breath  No nausea vomiting or diarrhea    Objective:     Vitals:   Temp (24hrs), Av 9 °F (36 6 °C), Min:97 5 °F (36 4 °C), Max:98 1 °F (36 7 °C)    Temp:  [97 5 °F (36 4 °C)-98 1 °F (36 7 °C)] 97 5 °F (36 4 °C)  HR:  [60-74] 74  Resp:  [16] 16  BP: (117-177)/(43-97) 117/66  Body mass index is 43 41 kg/m²  Input and Output Summary (last 24 hours):      Intake/Output Summary (Last 24 hours) at 1/10/2022 1141  Last data filed at 1/10/2022 1135  Gross per 24 hour   Intake 980 ml   Output 4500 ml   Net -3520 ml       Physical Exam:   Physical Exam   Patient is awake alert oriented no acute distress  Lung clear to auscultation bilateral anteriorly  Heart positive S1-S2 no murmur  Abdomen soft nontender  Lower extremity trace edema  Right foot TMA wrapped with dry dressing    Additional Data:     Labs:  Results from last 7 days   Lab Units 22  0458   WBC Thousand/uL 6 60   HEMOGLOBIN g/dL 7 7*   HEMATOCRIT % 23 7*   PLATELETS Thousands/uL 188   NEUTROS PCT % 67   LYMPHS PCT % 16   MONOS PCT % 9   EOS PCT % 6     Results from last 7 days   Lab Units 22  0458   SODIUM mmol/L 137   POTASSIUM mmol/L 4 9   CHLORIDE mmol/L 103   CO2 mmol/L 29   BUN mg/dL 58*   CREATININE mg/dL 5 47*   ANION GAP mmol/L 5   CALCIUM mg/dL 8 8   GLUCOSE RANDOM mg/dL 82     Results from last 7 days   Lab Units 22  0459   INR  1 97*     Results from last 7 days   Lab Units 01/10/22  0704 01/10/22  0020 01/09/22  2125 01/09/22  1613 01/09/22  1145 01/09/22  0839 01/09/22  0822 01/09/22  0820 01/08/22 2008 01/08/22  1626 01/08/22  1224 01/07/22  2054   POC GLUCOSE mg/dl 94 139 91 135 111 89 53* 55* 142* 132 90 182*               Lines/Drains:  Invasive Devices  Report    Line            Hemodialysis AV Fistula 02/20/18 Left Forearm 1419 days                      Imaging:  Reviewed    Recent Cultures (last 7 days):         Last 24 Hours Medication List:   Current Facility-Administered Medications   Medication Dose Route Frequency Provider Last Rate    acetaminophen  650 mg Oral Q6H PRN Robet Jewel, DPM      albuterol  2 puff Inhalation Q6H PRN Robet Jewel, DPM      ALPRAZolam  0 25 mg Oral 4x Daily PRN Robet Jewel, DPM      ammonium lactate   Topical BID PRN Robet Jewel, DPM      calcium carbonate  500 mg Oral TID PRN Jennifer Daily MD      carvedilol  25 mg Oral BID With Meals Robet Jewel, DPM      cinacalcet  30 mg Oral Daily Robet Jewel, DPM      Diclofenac Sodium  2 g Topical 4x Daily Jennifer Daily MD      fentaNYL  25 mcg Intravenous Q5 Min PRN Roseanna Liu CRNA      gabapentin  100 mg Oral TID Robet Jewel, DPM      hydrALAZINE  5 mg Intravenous Q6H PRN Robet Jewel, DPM      insulin glargine  10 Units Subcutaneous HS Declan Villalobos, DO      insulin lispro  1-5 Units Subcutaneous TID [facility] Robet Jewel, DPM      insulin lispro  3 Units Subcutaneous TID With Meals Robet Jewel, DPM      levothyroxine  50 mcg Oral Daily Robet Jewel, DPM      lidocaine  1 patch Topical Daily Jennifer Daily MD      melatonin  3 mg Oral HS Robet Jewel, DPM      metoclopramide  10 mg Oral Q6H PRN Gladys Chew MD      neomycin-bacitracin-polymyxin b  1 small application Topical BID Ferdinand Banai, DO      NIFEdipine  30 mg Oral Daily Robet Jewel, DPM      nystatin   Topical BID Robet Jewel, DPM      ondansetron  4 mg Oral Q6H PRN Gladys Chew MD      oxyCODONE  2 5 mg Oral Q4H PRN Oswaldo Houston DO      Or    oxyCODONE  5 mg Oral Q4H PRN Ferdinand Cradona, DO      pantoprazole  40 mg Oral Early Morning Marcus Cluck, DPM      polyethylene glycol  17 g Oral Daily Ferdinand Dulce, DO      senna  2 tablet Oral HS PRN Marcus Cluck, DPM      sertraline  75 mg Oral Daily Marcus Cluck, [address]      sevelamer  1,600 mg Oral TID With Meals Marcus Cluck, DPM      torsemide  100 mg Oral Daily Marcus Cluck, DPM      warfarin  9 mg Oral Once per day on Mon Tue Wed Thu Fri Sat Oswaldo Houston DO          Today, Patient Was Seen By: Jeannie Crooks DO    **Please Note: This note may have been constructed using a voice recognition system  **

## 2022-01-10 NOTE — PLAN OF CARE
Post-Dialysis RN Treatment Note    Blood Pressure:  Pre 159/97 mm/Hg  Post 117/66 mmHg   EDW  121 kg    Weight:  Pre 133 1 kg   Post 129 kg   Mode of weight measurement: Standing Scale   Volume Removed  4000 ml    Treatment duration 210 minutes    NS given  No    Treatment shortened? No   Medications given during Rx Xanax 0 25 mg   Estimated Kt/V  1 09   Access type: AV fistula   Access Issues: No    Report called to primary nurse   Yes [facility] for 3 5 hour HD treatment  UF goal set to remove 4 kg as tolerated      Problem: METABOLIC, FLUID AND ELECTROLYTES - ADULT  Goal: Electrolytes maintained within normal limits  Description: INTERVENTIONS:  - Monitor labs and assess patient for signs and symptoms of electrolyte imbalances  - Administer electrolyte replacement as ordered  - Monitor response to electrolyte replacements, including repeat lab results as appropriate  - Instruct patient on fluid and nutrition as appropriate  Outcome: Progressing  Goal: Fluid balance maintained  Description: INTERVENTIONS:  - Monitor labs   - Monitor I/O and WT  - Instruct patient on fluid and nutrition as appropriate  - Assess for signs & symptoms of volume excess or deficit  Outcome: Progressing

## 2022-01-10 NOTE — PROGRESS NOTES
NEPHROLOGY PROGRESS NOTE    Baylee Loja 49 y o  female MRN: [id number]  [facility] Encounter: [id number]  Reason for Consult:  End-stage renal disease    Patient was seen while on dialysis she had no complaints of pain in her foot no other than that said she was doing well  No complaints about her dialysis treatment  ASSESSMENT/PLAN:  1  Renal    The patient is end-stage renal disease hemodialysis is normally Tuesday Thursday Saturday but apparently she is going to be discharged to a rehab facility that required to be Monday Wednesday Friday dialysis so she is on dialysis today  For now continue dialysis Monday Wednesday Friday  Labs reviewed yesterday showed electrolytes were normal   Details of today's treatment outline below  Patient does not receive Epogen due to history of PE  Chronic anemia  Continue medications for secondary hyperparathyroidism  HEMODIALYSIS PROCEDURE NOTE  The patient was seen and examined on hemodialysis  Time: 3 5 hours  Sodium: 138 Blood flow: 400   Dialyzer: F160 Potassium: 2 Dialysate flow: 800   Access: AV Bicarbonate: 35 Ultrafiltration goal: 4 L as tolerated        Continue to try reduce volume as patient has edema so going for 4 L today and monitor  2  Infectious Disease    Patient osteomyelitis status post right transmetatarsal amputation  Wound care  SUBJECTIVE:  Review of Systems   Constitutional: Negative for chills, fever, malaise/fatigue and night sweats  HENT: Negative  Eyes: Negative  Cardiovascular: Positive for leg swelling  Negative for chest pain, orthopnea and palpitations  Respiratory: Negative  Negative for cough, shortness of breath, sputum production and wheezing  Gastrointestinal: Negative for abdominal pain, diarrhea, nausea and vomiting  Neurological: Negative for dizziness, focal weakness, headaches and weakness     Psychiatric/Behavioral: Negative for altered mental status, depression, hallucinations and hypervigilance  OBJECTIVE:  Current Weight: Weight - Scale: 122 kg (268 lb 15 4 oz)  Vitals:Temp (24hrs), Av °F (36 7 °C), Min:97 7 °F (36 5 °C), Max:98 1 °F (36 7 °C)  Current: Temperature: 97 7 °F (36 5 °C)   Blood pressure 124/52, pulse 60, temperature 97 7 °F (36 5 °C), temperature source Oral, resp  rate 16, height 5' 6" (1 676 m), weight 122 kg (268 lb 15 4 oz), last menstrual period 2016, SpO2 92 %, not currently breastfeeding  , Body mass index is 43 41 kg/m²  Intake/Output Summary (Last 24 hours) at 1/10/2022 0952  Last data filed at 1/10/2022 0805  Gross per 24 hour   Intake 680 ml   Output --   Net 680 ml       Physical Exam: /52   Pulse 60   Temp 97 7 °F (36 5 °C) (Oral)   Resp 16   Ht 5' 6" (1 676 m)   Wt 122 kg (268 lb 15 4 oz)   LMP 2016 (Exact Date)   SpO2 92%   BMI 43 41 kg/m²   Physical Exam  Constitutional:       General: She is not in acute distress  Appearance: She is not toxic-appearing or diaphoretic  HENT:      Head: Normocephalic and atraumatic  Nose:      Comments: Wearing mask     Mouth/Throat:      Comments: Wearing mask  Eyes:      General: No scleral icterus  Extraocular Movements: Extraocular movements intact  Cardiovascular:      Rate and Rhythm: Normal rate and regular rhythm  Heart sounds: No friction rub  No gallop  Comments: Positive edema  Pulmonary:      Effort: Pulmonary effort is normal  No respiratory distress  Breath sounds: Normal breath sounds  No wheezing, rhonchi or rales  Abdominal:      General: Bowel sounds are normal  There is no distension  Palpations: Abdomen is soft  Tenderness: There is no abdominal tenderness  There is no rebound  Musculoskeletal:      Cervical back: Normal range of motion and neck supple  Neurological:      General: No focal deficit present  Mental Status: She is alert and oriented to person, place, and time  Mental status is at baseline  Psychiatric:         Mood and Affect: Mood normal          Behavior: Behavior normal          Thought Content:  Thought content normal          Judgment: Judgment normal          Medications:    Current Facility-Administered Medications:     acetaminophen (TYLENOL) tablet 650 mg, 650 mg, Oral, Q6H PRN, Gail Goodwin DPM, 650 mg at 12/28/21 1721    albuterol (PROVENTIL HFA,VENTOLIN HFA) inhaler 2 puff, 2 puff, Inhalation, Q6H PRN, Gail Goodwin DPM    ALPRAZolam Sasha Ready) tablet 0 25 mg, 0 25 mg, Oral, 4x Daily PRN, Gail Goodwin, DPM, 0 25 mg at 01/10/22 0824    ammonium lactate (LAC-HYDRIN) 12 % cream, , Topical, BID PRN, Gail Goodwin DPM, Given at 12/21/21 2207    calcium carbonate (TUMS) chewable tablet 500 mg, 500 mg, Oral, TID PRN, Eufemia Lopez MD, 500 mg at 01/10/22 0044    carvedilol (COREG) tablet 25 mg, 25 mg, Oral, BID With Meals, Gail Goodwin DPM, 25 mg at 01/09/22 1555    cinacalcet (SENSIPAR) tablet 30 mg, 30 mg, Oral, Daily, Gail Goodwin DPM, 30 mg at 01/09/22 1557    Diclofenac Sodium (VOLTAREN) 1 % topical gel 2 g, 2 g, Topical, 4x Daily, Eufemia Lopez MD, 2 g at 01/09/22 2235    fentaNYL (SUBLIMAZE) injection 25 mcg, 25 mcg, Intravenous, Q5 Min PRN, Alivia Tony CRNA    gabapentin (NEURONTIN) capsule 100 mg, 100 mg, Oral, TID, Gail Goodwin DPM, 100 mg at 01/09/22 2118    hydrALAZINE (APRESOLINE) injection 5 mg, 5 mg, Intravenous, Q6H PRN, Gail Goodwin DPM, 5 mg at 12/21/21 0047    insulin glargine (LANTUS) subcutaneous injection 10 Units 0 1 mL, 10 Units, Subcutaneous, HS, Yaya Repress, DO    insulin lispro (HumaLOG) 100 units/mL subcutaneous injection 1-5 Units, 1-5 Units, Subcutaneous, TID AC, 1 Units at 01/04/22 1843 **AND** Fingerstick Glucose (POCT), , , TID AC, Gail Goodwin DPM    insulin lispro (HumaLOG) 100 units/mL subcutaneous injection 3 Units, 3 Units, Subcutaneous, TID With Meals, Gail Goodwin DPM, 3 Units at 01/09/22 1801    levothyroxine tablet 50 mcg, 50 mcg, Oral, Daily, Jaswinder Hill, DPM, 50 mcg at 01/10/22 0551    lidocaine (LIDODERM) 5 % patch 1 patch, 1 patch, Topical, Daily, Lindy Guajardo MD, 1 patch at 01/09/22 0834    melatonin tablet 3 mg, 3 mg, Oral, HS, Jaswinder Hill DPM, 3 mg at 01/09/22 2117    metoclopramide (REGLAN) tablet 10 mg, 10 mg, Oral, Q6H PRN, Neelima Chavez MD    neomycin-bacitracin-polymyxin b (NEOSPORIN) ointment 1 small application, 1 small application, Topical, BID, Ferdinand Cardona DO, 1 small application at 87/78/08 0835    NIFEdipine (PROCARDIA XL) 24 hr tablet 30 mg, 30 mg, Oral, Daily, Jaswinder Hill DPM, 30 mg at 01/08/22 1301    nystatin (MYCOSTATIN) powder, , Topical, BID, Jaswinder Hill DPM, Given at 01/09/22 0835    ondansetron (ZOFRAN-ODT) dispersible tablet 4 mg, 4 mg, Oral, Q6H PRN, Neelima Chavez MD, 4 mg at 01/06/22 1949    oxyCODONE (ROXICODONE) IR tablet 2 5 mg, 2 5 mg, Oral, Q4H PRN **OR** oxyCODONE (ROXICODONE) IR tablet 5 mg, 5 mg, Oral, Q4H PRN, Ferdinand Cardona DO, 5 mg at 01/10/22 0551    pantoprazole (PROTONIX) EC tablet 40 mg, 40 mg, Oral, Early Morning, Jaswinder Hill DPM, 40 mg at 01/10/22 0551    polyethylene glycol (MIRALAX) packet 17 g, 17 g, Oral, Daily, Ferdinand Cardona DO, 17 g at 01/09/22 2124    senna (SENOKOT) tablet 17 2 mg, 2 tablet, Oral, HS PRN, Jaswinder Hill DPM    sertraline (ZOLOFT) tablet 75 mg, 75 mg, Oral, Daily, Jaswinder Hill DPM, 75 mg at 01/09/22 4141    sevelamer (RENAGEL) tablet 1,600 mg, 1,600 mg, Oral, TID With Meals, Jaswinder Hill, DPM, 1,600 mg at 01/09/22 1555    torsemide (DEMADEX) tablet 100 mg, 100 mg, Oral, Daily, Jaswinder Infantedock, DPM, 100 mg at 01/08/22 1253    warfarin (COUMADIN) tablet 9 mg, 9 mg, Oral, Once per day on Mon Tue Wed Thu Fri Sat, Ferdinand Cardona, DO, 9 mg at 01/08/22 1744    Laboratory Results:  Lab Results   Component Value Date    WBC 6 60 01/09/2022    HGB 7 7 (L) 01/09/2022    HCT 23 7 (L) 01/09/2022    MCV 94 01/09/2022     01/09/2022     Lab Results Component Value Date    SODIUM 137 01/09/2022    K 4 9 01/09/2022     01/09/2022    CO2 29 01/09/2022    BUN 58 (H) 01/09/2022    CREATININE 5 47 (H) 01/09/2022    GLUC 82 01/09/2022    CALCIUM 8 8 01/09/2022     Lab Results   Component Value Date    CALCIUM 8 8 01/09/2022    PHOS 4 1 12/21/2021     No results found for: LABPROT

## 2022-01-10 NOTE — PLAN OF CARE
Problem: METABOLIC, FLUID AND ELECTROLYTES - ADULT  Goal: Electrolytes maintained within normal limits  Description: INTERVENTIONS:  - Monitor labs and assess patient for signs and symptoms of electrolyte imbalances  - Administer electrolyte replacement as ordered  - Monitor response to electrolyte replacements, including repeat lab results as appropriate  - Instruct patient on fluid and nutrition as appropriate  Outcome: Progressing  Goal: Fluid balance maintained  Description: INTERVENTIONS:  - Monitor labs   - Monitor I/O and WT  - Instruct patient on fluid and nutrition as appropriate  - Assess for signs & symptoms of volume excess or deficit  Outcome: Progressing     Problem: PAIN - ADULT  Goal: Verbalizes/displays adequate comfort level or baseline comfort level  Description: Interventions:  - Encourage patient to monitor pain and request assistance  - Assess pain using appropriate pain scale  - Administer analgesics based on type and severity of pain and evaluate response  - Implement non-pharmacological measures as appropriate and evaluate response  - Consider cultural and social influences on pain and pain management  - Notify physician/advanced practitioner if interventions unsuccessful or patient reports new pain  Outcome: Progressing     Problem: DISCHARGE PLANNING  Goal: Discharge to home or other facility with appropriate resources  Description: INTERVENTIONS:  - Identify barriers to discharge w/patient and caregiver  - Arrange for needed discharge resources and transportation as appropriate  - Identify discharge learning needs (meds, wound care, etc )  - Arrange for interpretive services to assist at discharge as needed  - Refer to Case Management Department for coordinating discharge planning if the patient needs post-hospital services based on physician/advanced practitioner order or complex needs related to functional status, cognitive ability, or social support system  Outcome: Progressing

## 2022-01-10 NOTE — ASSESSMENT & PLAN NOTE
Status post transmetatarsal amputation with Podiatry on 12/26/ 21  Patient nwo monitor off antibiotics Infectious Disease input noted  Podiatry input noted patient is noncompliant with nonweightbearing status and this was discussed with the patient and strongly recommended nonweightbearing status for proper healing  Physical therapy  -pending placement to [facility]

## 2022-01-11 PROBLEM — K59.00 CONSTIPATION: Status: ACTIVE | Noted: 2022-01-11

## 2022-01-11 LAB
GLUCOSE SERPL-MCNC: 102 MG/DL (ref 65–140)
GLUCOSE SERPL-MCNC: 105 MG/DL (ref 65–140)
GLUCOSE SERPL-MCNC: 145 MG/DL (ref 65–140)
GLUCOSE SERPL-MCNC: 99 MG/DL (ref 65–140)

## 2022-01-11 PROCEDURE — 82948 REAGENT STRIP/BLOOD GLUCOSE: CPT

## 2022-01-11 PROCEDURE — 99233 SBSQ HOSP IP/OBS HIGH 50: CPT | Performed by: INTERNAL MEDICINE

## 2022-01-11 PROCEDURE — 99232 SBSQ HOSP IP/OBS MODERATE 35: CPT | Performed by: INTERNAL MEDICINE

## 2022-01-11 RX ORDER — LACTULOSE 20 G/30ML
20 SOLUTION ORAL 2 TIMES DAILY
Status: DISCONTINUED | OUTPATIENT
Start: 2022-01-11 | End: 2022-01-12 | Stop reason: HOSPADM

## 2022-01-11 RX ORDER — LOPERAMIDE HYDROCHLORIDE 2 MG/1
2 CAPSULE ORAL 3 TIMES DAILY PRN
Status: DISCONTINUED | OUTPATIENT
Start: 2022-01-11 | End: 2022-01-12 | Stop reason: HOSPADM

## 2022-01-11 RX ORDER — SENNOSIDES 8.6 MG
1 TABLET ORAL
Status: DISCONTINUED | OUTPATIENT
Start: 2022-01-11 | End: 2022-01-11

## 2022-01-11 RX ORDER — SENNOSIDES 8.6 MG
2 TABLET ORAL
Status: DISCONTINUED | OUTPATIENT
Start: 2022-01-11 | End: 2022-01-12 | Stop reason: HOSPADM

## 2022-01-11 RX ORDER — ACETAMINOPHEN 325 MG/1
975 TABLET ORAL EVERY 8 HOURS SCHEDULED
Status: DISCONTINUED | OUTPATIENT
Start: 2022-01-11 | End: 2022-01-12 | Stop reason: HOSPADM

## 2022-01-11 RX ORDER — WARFARIN SODIUM 5 MG/1
10 TABLET ORAL
Status: DISCONTINUED | OUTPATIENT
Start: 2022-01-11 | End: 2022-01-12 | Stop reason: HOSPADM

## 2022-01-11 RX ADMIN — DICLOFENAC SODIUM 2 G: 10 GEL TOPICAL at 08:43

## 2022-01-11 RX ADMIN — NIFEDIPINE 30 MG: 30 TABLET, FILM COATED, EXTENDED RELEASE ORAL at 08:43

## 2022-01-11 RX ADMIN — SERTRALINE HYDROCHLORIDE 75 MG: 50 TABLET ORAL at 08:41

## 2022-01-11 RX ADMIN — DICLOFENAC SODIUM 2 G: 10 GEL TOPICAL at 17:03

## 2022-01-11 RX ADMIN — SEVELAMER HYDROCHLORIDE 1600 MG: 800 TABLET, FILM COATED PARENTERAL at 08:41

## 2022-01-11 RX ADMIN — TORSEMIDE 100 MG: 20 TABLET ORAL at 08:41

## 2022-01-11 RX ADMIN — LOPERAMIDE HYDROCHLORIDE 2 MG: 2 CAPSULE ORAL at 21:03

## 2022-01-11 RX ADMIN — CARVEDILOL 25 MG: 25 TABLET, FILM COATED ORAL at 17:09

## 2022-01-11 RX ADMIN — SEVELAMER HYDROCHLORIDE 1600 MG: 800 TABLET, FILM COATED PARENTERAL at 17:09

## 2022-01-11 RX ADMIN — LEVOTHYROXINE SODIUM 50 MCG: 50 TABLET ORAL at 05:28

## 2022-01-11 RX ADMIN — CINACALCET 30 MG: 30 TABLET, FILM COATED ORAL at 15:22

## 2022-01-11 RX ADMIN — BACITRACIN ZINC, NEOMYCIN, POLYMYXIN B 1 SMALL APPLICATION: 400; 3.5; 5 OINTMENT TOPICAL at 08:41

## 2022-01-11 RX ADMIN — GABAPENTIN 100 MG: 100 CAPSULE ORAL at 15:23

## 2022-01-11 RX ADMIN — PANTOPRAZOLE SODIUM 40 MG: 40 TABLET, DELAYED RELEASE ORAL at 05:28

## 2022-01-11 RX ADMIN — ACETAMINOPHEN 975 MG: 325 TABLET, FILM COATED ORAL at 15:22

## 2022-01-11 RX ADMIN — WARFARIN SODIUM 10 MG: 5 TABLET ORAL at 17:09

## 2022-01-11 RX ADMIN — INSULIN LISPRO 3 UNITS: 100 INJECTION, SOLUTION INTRAVENOUS; SUBCUTANEOUS at 11:25

## 2022-01-11 RX ADMIN — LIDOCAINE 5% 1 PATCH: 700 PATCH TOPICAL at 08:41

## 2022-01-11 RX ADMIN — DICLOFENAC SODIUM 2 G: 10 GEL TOPICAL at 21:10

## 2022-01-11 RX ADMIN — LACTULOSE 20 G: 20 SOLUTION ORAL at 09:38

## 2022-01-11 RX ADMIN — Medication 3 MG: at 21:03

## 2022-01-11 RX ADMIN — NYSTATIN: 100000 POWDER TOPICAL at 17:04

## 2022-01-11 RX ADMIN — OXYCODONE HYDROCHLORIDE 5 MG: 5 TABLET ORAL at 13:34

## 2022-01-11 RX ADMIN — ALPRAZOLAM 0.25 MG: 0.25 TABLET ORAL at 21:02

## 2022-01-11 RX ADMIN — SEVELAMER HYDROCHLORIDE 1600 MG: 800 TABLET, FILM COATED PARENTERAL at 11:25

## 2022-01-11 RX ADMIN — CALCIUM CARBONATE (ANTACID) CHEW TAB 500 MG 500 MG: 500 CHEW TAB at 21:02

## 2022-01-11 RX ADMIN — OXYCODONE HYDROCHLORIDE 5 MG: 5 TABLET ORAL at 09:41

## 2022-01-11 RX ADMIN — GABAPENTIN 100 MG: 100 CAPSULE ORAL at 21:02

## 2022-01-11 RX ADMIN — ACETAMINOPHEN 975 MG: 325 TABLET, FILM COATED ORAL at 21:02

## 2022-01-11 RX ADMIN — LACTULOSE 20 G: 20 SOLUTION ORAL at 17:09

## 2022-01-11 RX ADMIN — INSULIN GLARGINE 10 UNITS: 100 INJECTION, SOLUTION SUBCUTANEOUS at 21:09

## 2022-01-11 RX ADMIN — CARVEDILOL 25 MG: 25 TABLET, FILM COATED ORAL at 08:41

## 2022-01-11 RX ADMIN — GABAPENTIN 100 MG: 100 CAPSULE ORAL at 08:41

## 2022-01-11 RX ADMIN — BACITRACIN ZINC, NEOMYCIN, POLYMYXIN B 1 SMALL APPLICATION: 400; 3.5; 5 OINTMENT TOPICAL at 17:09

## 2022-01-11 NOTE — CASE MANAGEMENT
Case Management Discharge Planning Note    Patient name Roxanna Mcardle  Location [facility]-01 MRN [id number]  : 1965 Date 2022       Current Admission Date: 2021  Current Admission Diagnosis:Right foot ulceration with osteomyelitis   Patient Active Problem List    Diagnosis Date Noted    Right foot ulceration with osteomyelitis 2021    Pruritus 2021    Postop check 2021    Sigmoid diverticulitis 2021    Pneumonia 2021    Chronic anticoagulation 2021    Essential hypertension 2021    Arteriovenous fistula for hemodialysis in place, primary [facility]) 2021    Healthcare-associated pneumonia 2021    Right foot pain 2021    [facility] 2021    Chronic pain syndrome 2021    Bilateral primary osteoarthritis of knee 2021    Bilateral patellofemoral syndrome 2021    Tinea unguium 2020    S/P foot surgery, right 2020    History of claustrophobia 2020    Morbid obesity 2020    Hyperlipidemia 2020    Diabetic polyneuropathy associated with type 2 diabetes mellitus ([facility] ) 2020    Encounter for diabetic foot exam [facility] ) 2020    Corns and callosities 2020    History of amputation of lesser toe of right foot ([facility] ) 2020    Flu-like symptoms 2020    Lumbar radiculopathy 2020    Low back pain with sciatica 2020    Hemodialysis-associated hypotension 2019    Hyponatremia 2019    Parenchymal renal hypertension 2019    Candidiasis of breast 2019    Anemia of chronic disease 2019    Disruption of internal surgical wound 2019    Dyspnea 2019    Secondary hyperparathyroidism of renal origin ([facility] ) 2019    Nausea and vomiting 2019    Meningioma ([facility] ) 2019    Concussion without loss of consciousness 03/15/2019    Colon cancer screening 2019    Gastroesophageal reflux disease 03/05/2019    Primary osteoarthritis of right knee 10/25/2018    Hemodialysis status ([facility] ) 10/23/2018    Knee pain 10/22/2018    Ambulatory dysfunction 10/22/2018    Anxiety disorder 04/06/2017    Acquired hypothyroidism 07/22/2016    Glaucoma 07/01/2016    ESRD on hemodialysis 07/01/2016    Abnormal uterine bleeding 02/15/2016    History of venous thromboembolism 02/14/2016    [facility] ) 10/30/2015    Cervical dysplasia 05/03/2015    Chronic endometritis 10/17/2014    Tinea pedis 10/02/2014    Benign neoplasm of skin 09/25/2014    Insulin-dependent diabetes mellitus with neuropathy 09/04/2014    Phlebitis and thrombophlebitis of superficial vessels of lower extremities 04/10/2014    Limb pain 11/25/2013    Mononeuritis of upper limb, unspecified laterality 11/25/2013    Legal blindness, as defined in [place] 10/07/2938    Complication from renal dialysis device 04/22/2013    Essential hypertension 10/15/2012    Cardiomyopathy ([facility] ) 09/26/2012    Esophageal reflux 08/20/2012    Allergic rhinitis 08/20/2012      LOS (days): 22  Geometric Mean LOS (GMLOS) (days): 5 80  Days to GMLOS:-15 8     OBJECTIVE:  Risk of Unplanned Readmission Score: 67         Current admission status: Inpatient   Preferred Pharmacy:   [id number] [address]  [address] [place] [address]  Phone: [phone] Fax: [phone] [facility], [address]  [address] [place] [address]  Phone: [phone] Fax: [phone]    Primary Care Provider: Erin Tristan MD    Primary Insurance: MEDICARE  Secondary Insurance: [facility]    DISCHARGE DETAILS:    Discharge planning discussed with[de-identified] Pt  Freedom of Choice: Yes  Comments - Freedom of Choice: Pt requested that CM send referral for Home Care as she does not want to go SNF  CM contacted family/caregiver?: No- see comments (Pt is AxO)  Were Treatment Team discharge recommendations reviewed with patient/caregiver?: Yes  Did patient/caregiver verbalize understanding of patient care needs?: Yes  Were patient/caregiver advised of the risks associated with not following Treatment Team discharge recommendations?: Yes    [address]         Is the patient interested in Alcira Crespo at discharge?: Yes  [address] requested[de-identified] [address] Name[de-identified] Other  [facility] Provider[de-identified] PCP  Home Health Services Needed[de-identified] Evaluate Functional Status and Safety,Gait/ADL Training,Strengthening/Theraputic Exercises to Improve Function  Homebound Criteria Met[de-identified] Requires the Assistance of Another Person for Safe Ambulation or to Leave the Home,Uses an Assist Device (i e  cane, walker, etc)  Supporting Clincal Findings[de-identified] Limited Endurance    Other Referral/Resources/Interventions Provided:  Interventions: [facility]  Referral Comments: PRISCILA sent referral as requested via [facility]    Treatment Team Recommendation: Short Term Rehab  Discharge Destination Plan[de-identified] Home with [place] at Discharge : Family   Accompanied by: Family member     IMM Given (Date):: 01/11/22  IMM Given to[de-identified] Patient     Additional Comments: PRISCILA spoke to pt to discuss placement options  Per pt she does not want to go to [facility] and would rather go home with homecare

## 2022-01-11 NOTE — ASSESSMENT & PLAN NOTE
Monitor hemoglobin, has been stable around 7-8 for the past several days    -patient was transfused 1 unit packed red blood cells on 12/28/2021 and 1/5/2022 with appropriate response in hemoglobin    Continue to monitor

## 2022-01-11 NOTE — PROGRESS NOTES
NEPHROLOGY PROGRESS NOTE    Aubree French 49 y o  female MRN: [id number]  [facility] Encounter: [id number]  Reason for Consult:  End-stage renal disease    Patient is awake alert sitting up on the edge of the bed had dialysis yesterday with no reported issues and good volume removal   Otherwise she stable denied any pain in her foot  ASSESSMENT/PLAN:  1  Renal    The patient is end-stage renal disease hemodialysis is being performed Monday Wednesday Friday as apparently the rehab she is going to go to required her to change her days  She had dialysis yesterday  Will check labs tomorrow to see and make sure she does not need change in her prescription in terms of her electrolytes  Patient does have anemia has received packed red blood cell because she cannot get erythrocyte stimulating agent due to history of PE  BMP, CBC with dialysis tomorrow  Hemodialysis Monday Wednesday Friday  Continue to challenge volume removal as patient has edema  Discussed with her large fluid gains and she is aware she has been on dialysis for 14 years  2  Infectious Disease    Patient had osteomyelitis and status post right transmetatarsal amputation  Continue with wound care  Patient is stable no significant pain  SUBJECTIVE:  Review of Systems   Constitutional: Negative for chills, diaphoresis, fever and malaise/fatigue  HENT: Negative  Eyes: Negative  Cardiovascular: Positive for leg swelling  Negative for chest pain, dyspnea on exertion and orthopnea  Respiratory: Negative  Negative for cough, shortness of breath, sputum production and wheezing  Gastrointestinal: Negative for abdominal pain, diarrhea, nausea and vomiting  Neurological: Negative for dizziness, focal weakness, headaches and weakness  Psychiatric/Behavioral: Negative for altered mental status, depression, hallucinations and hypervigilance         OBJECTIVE:  Current Weight: Weight - Scale: 122 kg (268 lb 15 4 oz)  Vitals:Temp (24hrs), Av 9 °F (36 6 °C), Min:97 5 °F (36 4 °C), Max:98 2 °F (36 8 °C)  Current: Temperature: 98 2 °F (36 8 °C)   Blood pressure 114/66, pulse 68, temperature 98 2 °F (36 8 °C), resp  rate 17, height 5' 6" (1 676 m), weight 122 kg (268 lb 15 4 oz), last menstrual period 2016, SpO2 97 %, not currently breastfeeding  , Body mass index is 43 41 kg/m²  Intake/Output Summary (Last 24 hours) at 2022 0855  Last data filed at 1/10/2022 1806  Gross per 24 hour   Intake 720 ml   Output 4500 ml   Net -3780 ml       Physical Exam: /66   Pulse 68   Temp 98 2 °F (36 8 °C)   Resp 17   Ht 5' 6" (1 676 m)   Wt 122 kg (268 lb 15 4 oz)   LMP 2016 (Exact Date)   SpO2 97%   BMI 43 41 kg/m²   Physical Exam  Constitutional:       General: She is not in acute distress  Appearance: She is not toxic-appearing or diaphoretic  HENT:      Head: Normocephalic and atraumatic  Mouth/Throat:      Mouth: Mucous membranes are dry  Eyes:      General: No scleral icterus  Extraocular Movements: Extraocular movements intact  Cardiovascular:      Rate and Rhythm: Normal rate and regular rhythm  Heart sounds: No friction rub  No gallop  Comments: Positive edema  Pulmonary:      Effort: Pulmonary effort is normal  No respiratory distress  Breath sounds: Normal breath sounds  No wheezing, rhonchi or rales  Abdominal:      General: Bowel sounds are normal  There is no distension  Palpations: Abdomen is soft  Tenderness: There is no abdominal tenderness  There is no rebound  Musculoskeletal:      Cervical back: Normal range of motion and neck supple  Neurological:      General: No focal deficit present  Mental Status: She is alert and oriented to person, place, and time  Mental status is at baseline  Psychiatric:         Mood and Affect: Mood normal          Behavior: Behavior normal          Thought Content:  Thought content normal          Judgment: Judgment normal          Medications:    Current Facility-Administered Medications:     acetaminophen (TYLENOL) tablet 650 mg, 650 mg, Oral, Q6H PRN, Libia Rice DPM, 650 mg at 12/28/21 1721    albuterol (PROVENTIL HFA,VENTOLIN HFA) inhaler 2 puff, 2 puff, Inhalation, Q6H PRN, Libia Rice DPM    ALPRAZolam Delford Vu) tablet 0 25 mg, 0 25 mg, Oral, 4x Daily PRN, Libia Rice DPM, 0 25 mg at 01/10/22 2128    ammonium lactate (LAC-HYDRIN) 12 % cream, , Topical, BID PRN, Libia Rice DPM, Given at 12/21/21 2207    calcium carbonate (TUMS) chewable tablet 500 mg, 500 mg, Oral, TID PRN, Kassidy Varghese MD, 500 mg at 01/10/22 0044    carvedilol (COREG) tablet 25 mg, 25 mg, Oral, BID With Meals, Libia Rice DPM, 25 mg at 01/11/22 0841    cinacalcet (SENSIPAR) tablet 30 mg, 30 mg, Oral, Daily, Libia Rice DPM, 30 mg at 01/10/22 1610    Diclofenac Sodium (VOLTAREN) 1 % topical gel 2 g, 2 g, Topical, 4x Daily, Kassidy Varghese MD, 2 g at 01/11/22 0843    fentaNYL (SUBLIMAZE) injection 25 mcg, 25 mcg, Intravenous, Q5 Min PRN, Saba Gutierrez CRNA    gabapentin (NEURONTIN) capsule 100 mg, 100 mg, Oral, TID, Libia Rice DPM, 100 mg at 01/11/22 0841    hydrALAZINE (APRESOLINE) injection 5 mg, 5 mg, Intravenous, Q6H PRN, Libia Rice DPM, 5 mg at 12/21/21 0047    insulin glargine (LANTUS) subcutaneous injection 10 Units 0 1 mL, 10 Units, Subcutaneous, HS, Edie Durán, DO, 10 Units at 01/10/22 2133    insulin lispro (HumaLOG) 100 units/mL subcutaneous injection 1-5 Units, 1-5 Units, Subcutaneous, TID AC, 1 Units at 01/10/22 1611 **AND** Fingerstick Glucose (POCT), , , TID AC, Libia Rice DPM    insulin lispro (HumaLOG) 100 units/mL subcutaneous injection 3 Units, 3 Units, Subcutaneous, TID With Meals, Libia Rice DPM, 3 Units at 01/10/22 1610    levothyroxine tablet 50 mcg, 50 mcg, Oral, Daily, Libia Rice DPM, 50 mcg at 01/11/22 0528    lidocaine (LIDODERM) 5 % patch 1 patch, 1 patch, Topical, Daily, Natalie Lopez MD, 1 patch at 01/11/22 0841    melatonin tablet 3 mg, 3 mg, Oral, HS, Dorethia Garden, DPM, 3 mg at 01/10/22 2128    metoclopramide (REGLAN) tablet 10 mg, 10 mg, Oral, Q6H PRN, Vivek Garcia MD    neomycin-bacitracin-polymyxin b (NEOSPORIN) ointment 1 small application, 1 small application, Topical, BID, Ferdinand Cardona DO, 1 small application at 63/43/90 0841    NIFEdipine (PROCARDIA XL) 24 hr tablet 30 mg, 30 mg, Oral, Daily, Zulemaia Kait, DPM, 30 mg at 01/11/22 0843    nystatin (MYCOSTATIN) powder, , Topical, BID, Zulemaia Kait, DPM, Given at 01/10/22 1610    ondansetron (ZOFRAN-ODT) dispersible tablet 4 mg, 4 mg, Oral, Q6H PRN, Vivek Garcia MD, 4 mg at 01/06/22 1949    oxyCODONE (ROXICODONE) IR tablet 2 5 mg, 2 5 mg, Oral, Q4H PRN **OR** oxyCODONE (ROXICODONE) IR tablet 5 mg, 5 mg, Oral, Q4H PRN, Ferdinand Cardona, DO, 5 mg at 01/10/22 2133    pantoprazole (PROTONIX) EC tablet 40 mg, 40 mg, Oral, Early Morning, Zulemaia Kait, DPM, 40 mg at 01/11/22 0528    polyethylene glycol (MIRALAX) packet 17 g, 17 g, Oral, Daily, Ferdinand Cardona, DO, 17 g at 01/09/22 2124    senna (SENOKOT) tablet 17 2 mg, 2 tablet, Oral, HS PRN, Zulemaia Garden, DPM    sertraline (ZOLOFT) tablet 75 mg, 75 mg, Oral, Daily, Dorethia Garden, DPM, 75 mg at 01/11/22 0841    sevelamer (RENAGEL) tablet 1,600 mg, 1,600 mg, Oral, TID With Meals, Zulemaia Kait, DPM, 1,600 mg at 01/11/22 0841    torsemide (DEMADEX) tablet 100 mg, 100 mg, Oral, Daily, Violette Carballo, WILTONM, 100 mg at 01/11/22 0841    warfarin (COUMADIN) tablet 9 mg, 9 mg, Oral, Once per day on Mon Tue Wed Thu Fri Sat, Ferdinand Cardona, DO, 9 mg at 01/10/22 1743    Laboratory Results:  Lab Results   Component Value Date    WBC 6 60 01/09/2022    HGB 7 7 (L) 01/09/2022    HCT 23 7 (L) 01/09/2022    MCV 94 01/09/2022     01/09/2022     Lab Results   Component Value Date    SODIUM 137 01/09/2022    K 4 9 01/09/2022     01/09/2022    CO2 29 01/09/2022    BUN 58 (H) 01/09/2022 CREATININE 5 47 (H) 01/09/2022    GLUC 82 01/09/2022    CALCIUM 8 8 01/09/2022     Lab Results   Component Value Date    CALCIUM 8 8 01/09/2022    PHOS 4 1 12/21/2021     No results found for: LABPROT

## 2022-01-11 NOTE — ASSESSMENT & PLAN NOTE
Status post transmetatarsal amputation with Podiatry on 12/26/ 21  Patient nwo monitor off antibiotics Infectious Disease input noted  Podiatry input noted patient is noncompliant with nonweightbearing status and this was discussed with the patient and strongly recommended nonweightbearing status for proper healing  Physical therapy  Pending placement to [facility]

## 2022-01-11 NOTE — PLAN OF CARE
Problem: Potential for Falls  Goal: Patient will remain free of falls  Description: INTERVENTIONS:  - Educate patient/family on patient safety including physical limitations  - Instruct patient to call for assistance with activity   - Consult OT/PT to assist with strengthening/mobility   - Keep Call bell within reach  - Keep bed low and locked with side rails adjusted as appropriate  - Keep care items and personal belongings within reach  - Initiate and maintain comfort rounds  - Make Fall Risk Sign visible to staff  - Apply yellow socks and bracelet for high fall risk patients  - Consider moving patient to room near nurses station  Outcome: Progressing     Problem: METABOLIC, FLUID AND ELECTROLYTES - ADULT  Goal: Electrolytes maintained within normal limits  Description: INTERVENTIONS:  - Monitor labs and assess patient for signs and symptoms of electrolyte imbalances  - Administer electrolyte replacement as ordered  - Monitor response to electrolyte replacements, including repeat lab results as appropriate  - Instruct patient on fluid and nutrition as appropriate  Outcome: Progressing  Goal: Fluid balance maintained  Description: INTERVENTIONS:  - Monitor labs   - Monitor I/O and WT  - Instruct patient on fluid and nutrition as appropriate  - Assess for signs & symptoms of volume excess or deficit  Outcome: Progressing     Problem: PAIN - ADULT  Goal: Verbalizes/displays adequate comfort level or baseline comfort level  Description: Interventions:  - Encourage patient to monitor pain and request assistance  - Assess pain using appropriate pain scale  - Administer analgesics based on type and severity of pain and evaluate response  - Implement non-pharmacological measures as appropriate and evaluate response  - Consider cultural and social influences on pain and pain management  - Notify physician/advanced practitioner if interventions unsuccessful or patient reports new pain  Outcome: Progressing     Problem: INFECTION - ADULT  Goal: Absence or prevention of progression during hospitalization  Description: INTERVENTIONS:  - Assess and monitor for signs and symptoms of infection  - Monitor lab/diagnostic results  - Monitor all insertion sites, i e  indwelling lines, tubes, and drains  - Monitor endotracheal if appropriate and nasal secretions for changes in amount and color  - [place] appropriate cooling/warming therapies per order  - Administer medications as ordered  - Instruct and encourage patient and family to use good hand hygiene technique  - Identify and instruct in appropriate isolation precautions for identified infection/condition  Outcome: Progressing  Goal: Absence of fever/infection during neutropenic period  Description: INTERVENTIONS:  - Monitor WBC    Outcome: Progressing     Problem: SAFETY ADULT  Goal: Patient will remain free of falls  Description: INTERVENTIONS:  - Educate patient/family on patient safety including physical limitations  - Instruct patient to call for assistance with activity   - Consult OT/PT to assist with strengthening/mobility   - Keep Call bell within reach  - Keep bed low and locked with side rails adjusted as appropriate  - Keep care items and personal belongings within reach  - Initiate and maintain comfort rounds  - Make Fall Risk Sign visible to staff  - Apply yellow socks and bracelet for high fall risk patients  - Consider moving patient to room near nurses station  Outcome: Progressing  Goal: Maintain or return to baseline ADL function  Description: INTERVENTIONS:  -  Assess patient's ability to carry out ADLs; assess patient's baseline for ADL function and identify physical deficits which impact ability to perform ADLs (bathing, care of mouth/teeth, toileting, grooming, dressing, etc )  - Assess/evaluate cause of self-care deficits   - Assess range of motion  - Assess patient's mobility; develop plan if impaired  - Assess patient's need for assistive devices and provide as appropriate  - Encourage maximum independence but intervene and supervise when necessary  - Involve family in performance of ADLs  - Assess for home care needs following discharge   - Consider OT consult to assist with ADL evaluation and planning for discharge  - Provide patient education as appropriate  Outcome: Progressing  Goal: Maintains/Returns to pre admission functional level  Description: INTERVENTIONS:  - Perform BMAT or MOVE assessment daily    - Set and communicate daily mobility goal to care team and patient/family/caregiver  - Collaborate with rehabilitation services on mobility goals if consulted  - Out of bed for toileting  - Record patient progress and toleration of activity level   Outcome: Progressing     Problem: DISCHARGE PLANNING  Goal: Discharge to home or other facility with appropriate resources  Description: INTERVENTIONS:  - Identify barriers to discharge w/patient and caregiver  - Arrange for needed discharge resources and transportation as appropriate  - Identify discharge learning needs (meds, wound care, etc )  - Arrange for interpretive services to assist at discharge as needed  - Refer to Case Management Department for coordinating discharge planning if the patient needs post-hospital services based on physician/advanced practitioner order or complex needs related to functional status, cognitive ability, or social support system  Outcome: Progressing     Problem: Knowledge Deficit  Goal: Patient/family/caregiver demonstrates understanding of disease process, treatment plan, medications, and discharge instructions  Description: Complete learning assessment and assess knowledge base    Interventions:  - Provide teaching at level of understanding  - Provide teaching via preferred learning methods  Outcome: Progressing
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
## 2022-01-11 NOTE — PROGRESS NOTES
Pastoral Care Progress Note    2022  Patient: Payton Blackwell : 1965  Admission Date & Time: 2021 1507  MRN: [id number] CSN: [id number]       followed up with pt to continue relationship of support and care  Listened empathetically to frustrations with insurance  Pt anticipating going home, but anxious about transport to dialysis  Planning son's 15th bday party   facilitated life review               Chaplaincy Interventions Utilized:   Empowerment: Encouraged focus on present, Encouraged self-care and Normalized experience of patient/family    Exploration: Facilitated story telling    Relationship Building: Cultivated a relationship of care and support and Listened empathically    Chaplaincy Outcomes Achieved:  Emotional resources utilized and Expressed gratitude    Spiritual Coping Strategies Utilized:   Connectedness      22 1200   Clinical Encounter Type   Visited With Patient   Routine Visit Follow-up   Voodoo Encounters   Voodoo Needs Prayer

## 2022-01-11 NOTE — PROGRESS NOTES
1425 [facility]  Progress Note Sandra Foote 1965, 49 y o  female MRN: [id number]  Unit/Bed#: [facility] 933-01 Encounter: [id number]  Primary Care Provider: Erin Tristan MD   Date and time admitted to hospital: 12/20/2021  3:07 PM    * Right foot ulceration with osteomyelitis  Assessment & Plan  Status post transmetatarsal amputation with Podiatry on 12/26/ 21  Patient nwo monitor off antibiotics Infectious Disease input noted  Podiatry input noted patient is noncompliant with nonweightbearing status and this was discussed with the patient and strongly recommended nonweightbearing status for proper healing  Physical therapy  Pending placement to [facility]        History of venous thromboembolism  Assessment & Plan  Warfarin for anticoagulation  Goal INR 2-3,  Subtherapeutic INR  Change Coumadin to 10 mg every day except Sunday 12 mg    ESRD on hemodialysis  Assessment & Plan  Routine hemodialysis per nephrology MWF  Continue Renagel/Sensipar supplementation        Constipation  Assessment & Plan  Patient refusing MiraLax  Add lactulose and senna    Essential hypertension  Assessment & Plan  Monitor blood pressures  Avoid hypotension    Insulin-dependent diabetes mellitus with neuropathy  Assessment & Plan  Lab Results   Component Value Date    HGBA1C 7 9 (H) 09/30/2021     Continue basal/prandial insulin w/ additional SSI coverage per Accu-Cheks  Carbohydrate restricted diet  Continue Gabapentin for neuropathy    Morbid obesity  Assessment & Plan  BMI of 43 41  Lifestyle/diet modifications    Anemia of chronic disease  Assessment & Plan  Monitor hemoglobin, has been stable around 7-8 for the past several days    -patient was transfused 1 unit packed red blood cells on 12/28/2021 and 1/5/2022 with appropriate response in hemoglobin    Continue to monitor      VTE Pharmacologic Prophylaxis:   Coumadin    Patient Centered Rounds: I performed bedside rounds with nursing staff today   Discussions with Specialists or Other Care Team Provider:       Time Spent for Care: 45 minutes  More than 50% of total time spent on counseling and coordination of care as described above  Current Length of Stay: 22 day(s)  Current Patient Status: Inpatient   Certification Statement: The patient will continue to require additional inpatient hospital stay due to Awaiting placement  Discharge Plan: Awaiting placement rehab versus home,  is following    Code Status: Level 1 - Full Code    Subjective:   Patient seen and examined  Comfortable in bed  No complaints  No nausea vomiting  No bowel movement, refusing MiraLax    Objective:     Vitals:   Temp (24hrs), Av 9 °F (36 6 °C), Min:97 5 °F (36 4 °C), Max:98 2 °F (36 8 °C)    Temp:  [97 5 °F (36 4 °C)-98 2 °F (36 8 °C)] 98 2 °F (36 8 °C)  HR:  [68-74] 68  Resp:  [16-18] 17  BP: (114-163)/(66-73) 114/66  SpO2:  [97 %] 97 %  Body mass index is 43 41 kg/m²  Input and Output Summary (last 24 hours):      Intake/Output Summary (Last 24 hours) at 2022 1125  Last data filed at 1/10/2022 1806  Gross per 24 hour   Intake 720 ml   Output 4500 ml   Net -3780 ml       Physical Exam:   Physical Exam     Patient is awake alert in no acute distress  Lung clear to auscultation bilateral  Heart positive S1 S2 no murmur  Abdomen soft nontender  Lower extremities with trace edema  Right foot TMA wrapped with dry dressing    Additional Data:     Labs:  Results from last 7 days   Lab Units 22  0458   WBC Thousand/uL 6 60   HEMOGLOBIN g/dL 7 7*   HEMATOCRIT % 23 7*   PLATELETS Thousands/uL 188   NEUTROS PCT % 67   LYMPHS PCT % 16   MONOS PCT % 9   EOS PCT % 6     Results from last 7 days   Lab Units 22  0458   SODIUM mmol/L 137   POTASSIUM mmol/L 4 9   CHLORIDE mmol/L 103   CO2 mmol/L 29   BUN mg/dL 58*   CREATININE mg/dL 5 47*   ANION GAP mmol/L 5   CALCIUM mg/dL 8 8   GLUCOSE RANDOM mg/dL 82     Results from last 7 days   Lab Units 01/10/22  1418   INR  1 87*     Results from last 7 days   Lab Units 01/11/22  0816 01/10/22  2103 01/10/22  1606 01/10/22  1231 01/10/22  0704 01/10/22  0020 01/09/22  2125 01/09/22  1613 01/09/22  1145 01/09/22  0839 01/09/22  0822 01/09/22  0820   POC GLUCOSE mg/dl 102 161* 212* 138 94 139 91 135 111 89 53* 55*               Lines/Drains:  Invasive Devices  Report    Line            Hemodialysis AV Fistula 02/20/18 Left Forearm 1420 days                      Imaging: No pertinent imaging reviewed      Recent Cultures (last 7 days):         Last 24 Hours Medication List:   Current Facility-Administered Medications   Medication Dose Route Frequency Provider Last Rate    acetaminophen  650 mg Oral Q6H PRN Gail Goodwin, DPM      albuterol  2 puff Inhalation Q6H PRN Gail Goodwin, DPM      ALPRAZolam  0 25 mg Oral 4x Daily PRN Gail Goodwin DPM      ammonium lactate   Topical BID PRN Gail Goodwin, WILTONM      calcium carbonate  500 mg Oral TID PRN Eufemia Lopez MD      carvedilol  25 mg Oral BID With Meals Gail Goodwin DPM      cinacalcet  30 mg Oral Daily Gail [facility] [place]      Diclofenac Sodium  2 g Topical 4x Daily Eufemia Lopez MD      fentaNYL  25 mcg Intravenous Q5 Min PRN Alivia Tony CRNA      gabapentin  100 mg Oral TID Gali Goodwin DPM      hydrALAZINE  5 mg Intravenous Q6H PRN Gail Goodwin DPM      insulin glargine  10 Units Subcutaneous HS Yaya Bustillos, DO      insulin lispro  1-5 Units Subcutaneous TID [facility] [place] Gail Goodwin, DPM      insulin lispro  3 Units Subcutaneous TID With Meals Gail Goodwin DPM      lactulose  20 g Oral BID Yaya Repress, DO      levothyroxine  50 mcg Oral Daily Gail Goodwin [place]      lidocaine  1 patch Topical Daily Eufemia Lopez MD      melatonin  3 mg Oral HS Gail Goodwin DPM      metoclopramide  10 mg Oral Q6H PRN Brandyn Arciniega MD      neomycin-bacitracin-polymyxin b  1 small application Topical BID Ferdinand Banai, DO      NIFEdipine  30 mg Oral Daily Justin Dionne, DPM      nystatin   Topical BID Justin Truong, WILTONM      ondansetron  4 mg Oral Q6H PRN Chiqui Pizarro MD      oxyCODONE  2 5 mg Oral Q4H PRN Ferdinand Cardona DO      Or    oxyCODONE  5 mg Oral Q4H PRN Ferdinand Cardona, DO      pantoprazole  40 mg Oral Early Morning Justin Truong, NAT      senna  2 tablet Oral HS Traci Holcomb DO      sertraline  75 mg Oral Daily Elvira Canales      sevelamer  1,600 mg Oral TID With Meals Justin Truong DPM      torsemide  100 mg Oral Daily Elvira Canales      warfarin  10 mg Oral Once per day on Mon Tue Wed Thu Fri Sat Traci Holcomb DO          Today, Patient Was Seen By: Traci Holcomb DO    **Please Note: This note may have been constructed using a voice recognition system  **

## 2022-01-11 NOTE — ASSESSMENT & PLAN NOTE
Warfarin for anticoagulation  Goal INR 2-3,  Subtherapeutic INR  Change Coumadin to 10 mg every day except Sunday 12 mg

## 2022-01-12 ENCOUNTER — APPOINTMENT (INPATIENT)
Dept: DIALYSIS | Facility: HOSPITAL | Age: 57
DRG: 617 | End: 2022-01-12
Attending: INTERNAL MEDICINE
Payer: MEDICARE

## 2022-01-12 VITALS
HEIGHT: 66 IN | WEIGHT: 268.96 LBS | TEMPERATURE: 97.5 F | OXYGEN SATURATION: 97 % | DIASTOLIC BLOOD PRESSURE: 62 MMHG | BODY MASS INDEX: 43.23 KG/M2 | RESPIRATION RATE: 16 BRPM | SYSTOLIC BLOOD PRESSURE: 128 MMHG | HEART RATE: 69 BPM

## 2022-01-12 LAB
ANION GAP SERPL CALCULATED.3IONS-SCNC: 6 MMOL/L (ref 4–13)
BASOPHILS # BLD AUTO: 0.04 THOUSANDS/ΜL (ref 0–0.1)
BASOPHILS NFR BLD AUTO: 1 % (ref 0–1)
BUN SERPL-MCNC: 80 MG/DL (ref 5–25)
CALCIUM SERPL-MCNC: 8.7 MG/DL (ref 8.3–10.1)
CHLORIDE SERPL-SCNC: 105 MMOL/L (ref 100–108)
CO2 SERPL-SCNC: 25 MMOL/L (ref 21–32)
CREAT SERPL-MCNC: 7.64 MG/DL (ref 0.6–1.3)
DME PARACHUTE DELIVERY DATE EXPECTED: NORMAL
DME PARACHUTE DELIVERY DATE REQUESTED: NORMAL
DME PARACHUTE ITEM DESCRIPTION: NORMAL
DME PARACHUTE ITEM DESCRIPTION: NORMAL
DME PARACHUTE ORDER STATUS: NORMAL
DME PARACHUTE SUPPLIER NAME: NORMAL
DME PARACHUTE SUPPLIER PHONE: NORMAL
EOSINOPHIL # BLD AUTO: 0.55 THOUSAND/ΜL (ref 0–0.61)
EOSINOPHIL NFR BLD AUTO: 8 % (ref 0–6)
ERYTHROCYTE [DISTWIDTH] IN BLOOD BY AUTOMATED COUNT: 15.7 % (ref 11.6–15.1)
GFR SERPL CREATININE-BSD FRML MDRD: 5 ML/MIN/1.73SQ M
GLUCOSE SERPL-MCNC: 142 MG/DL (ref 65–140)
GLUCOSE SERPL-MCNC: 151 MG/DL (ref 65–140)
GLUCOSE SERPL-MCNC: 165 MG/DL (ref 65–140)
HCT VFR BLD AUTO: 25.1 % (ref 34.8–46.1)
HGB BLD-MCNC: 8.1 G/DL (ref 11.5–15.4)
IMM GRANULOCYTES # BLD AUTO: 0.03 THOUSAND/UL (ref 0–0.2)
IMM GRANULOCYTES NFR BLD AUTO: 0 % (ref 0–2)
INR PPP: 1.77 (ref 0.84–1.19)
LYMPHOCYTES # BLD AUTO: 1.14 THOUSANDS/ΜL (ref 0.6–4.47)
LYMPHOCYTES NFR BLD AUTO: 17 % (ref 14–44)
MCH RBC QN AUTO: 30.6 PG (ref 26.8–34.3)
MCHC RBC AUTO-ENTMCNC: 32.3 G/DL (ref 31.4–37.4)
MCV RBC AUTO: 95 FL (ref 82–98)
MONOCYTES # BLD AUTO: 0.62 THOUSAND/ΜL (ref 0.17–1.22)
MONOCYTES NFR BLD AUTO: 9 % (ref 4–12)
NEUTROPHILS # BLD AUTO: 4.29 THOUSANDS/ΜL (ref 1.85–7.62)
NEUTS SEG NFR BLD AUTO: 65 % (ref 43–75)
NRBC BLD AUTO-RTO: 0 /100 WBCS
PLATELET # BLD AUTO: 176 THOUSANDS/UL (ref 149–390)
PMV BLD AUTO: 10.2 FL (ref 8.9–12.7)
POTASSIUM SERPL-SCNC: 5.2 MMOL/L (ref 3.5–5.3)
PROTHROMBIN TIME: 19.8 SECONDS (ref 11.6–14.5)
RBC # BLD AUTO: 2.65 MILLION/UL (ref 3.81–5.12)
SODIUM SERPL-SCNC: 136 MMOL/L (ref 136–145)
WBC # BLD AUTO: 6.67 THOUSAND/UL (ref 4.31–10.16)

## 2022-01-12 PROCEDURE — 90935 HEMODIALYSIS ONE EVALUATION: CPT | Performed by: INTERNAL MEDICINE

## 2022-01-12 PROCEDURE — 80048 BASIC METABOLIC PNL TOTAL CA: CPT | Performed by: INTERNAL MEDICINE

## 2022-01-12 PROCEDURE — 99024 POSTOP FOLLOW-UP VISIT: CPT | Performed by: PODIATRIST

## 2022-01-12 PROCEDURE — 85025 COMPLETE CBC W/AUTO DIFF WBC: CPT | Performed by: INTERNAL MEDICINE

## 2022-01-12 PROCEDURE — 82948 REAGENT STRIP/BLOOD GLUCOSE: CPT

## 2022-01-12 PROCEDURE — 85610 PROTHROMBIN TIME: CPT | Performed by: INTERNAL MEDICINE

## 2022-01-12 PROCEDURE — 99239 HOSP IP/OBS DSCHRG MGMT >30: CPT | Performed by: INTERNAL MEDICINE

## 2022-01-12 RX ORDER — INSULIN GLARGINE 100 [IU]/ML
15 INJECTION, SOLUTION SUBCUTANEOUS
Qty: 15 ML | Refills: 0
Start: 2022-01-12 | End: 2022-07-14

## 2022-01-12 RX ORDER — OXYCODONE HYDROCHLORIDE 5 MG/1
5 TABLET ORAL EVERY 4 HOURS PRN
Qty: 15 TABLET | Refills: 0 | Status: SHIPPED | OUTPATIENT
Start: 2022-01-12 | End: 2022-07-14

## 2022-01-12 RX ADMIN — PANTOPRAZOLE SODIUM 40 MG: 40 TABLET, DELAYED RELEASE ORAL at 05:57

## 2022-01-12 RX ADMIN — SEVELAMER HYDROCHLORIDE 1600 MG: 800 TABLET, FILM COATED PARENTERAL at 13:30

## 2022-01-12 RX ADMIN — ACETAMINOPHEN 975 MG: 325 TABLET, FILM COATED ORAL at 05:57

## 2022-01-12 RX ADMIN — LEVOTHYROXINE SODIUM 50 MCG: 50 TABLET ORAL at 05:57

## 2022-01-12 RX ADMIN — BACITRACIN ZINC, NEOMYCIN, POLYMYXIN B 1 SMALL APPLICATION: 400; 3.5; 5 OINTMENT TOPICAL at 08:00

## 2022-01-12 RX ADMIN — ACETAMINOPHEN 975 MG: 325 TABLET, FILM COATED ORAL at 13:30

## 2022-01-12 RX ADMIN — DICLOFENAC SODIUM 2 G: 10 GEL TOPICAL at 08:01

## 2022-01-12 RX ADMIN — OXYCODONE HYDROCHLORIDE 5 MG: 5 TABLET ORAL at 13:31

## 2022-01-12 RX ADMIN — INSULIN LISPRO 1 UNITS: 100 INJECTION, SOLUTION INTRAVENOUS; SUBCUTANEOUS at 13:31

## 2022-01-12 RX ADMIN — ALPRAZOLAM 0.25 MG: 0.25 TABLET ORAL at 08:01

## 2022-01-12 RX ADMIN — INSULIN LISPRO 3 UNITS: 100 INJECTION, SOLUTION INTRAVENOUS; SUBCUTANEOUS at 08:08

## 2022-01-12 RX ADMIN — SEVELAMER HYDROCHLORIDE 1600 MG: 800 TABLET, FILM COATED PARENTERAL at 08:00

## 2022-01-12 RX ADMIN — INSULIN LISPRO 3 UNITS: 100 INJECTION, SOLUTION INTRAVENOUS; SUBCUTANEOUS at 13:32

## 2022-01-12 RX ADMIN — GABAPENTIN 100 MG: 100 CAPSULE ORAL at 08:00

## 2022-01-12 RX ADMIN — OXYCODONE HYDROCHLORIDE 5 MG: 5 TABLET ORAL at 05:56

## 2022-01-12 RX ADMIN — SERTRALINE HYDROCHLORIDE 75 MG: 50 TABLET ORAL at 08:00

## 2022-01-12 NOTE — PROGRESS NOTES
Podiatry - Progress Note  Patient: Sabino Mercedes 49 y o  female   MRN: [id number]  PCP: Miranda Holguin MD  Unit/Bed#: [facility] 933-01 Encounter: [id number]  Date Of Visit: 22    ASSESSMENT:    Sabino Mercedes is a 49 y o  female with:    1  Right medial 1st MTPJ diabetic ulceration- Olguin 3 - POA              -s/p R TMA (DOS: 21)  2  T2DM  3  ESRD on HD  4  Obesity      PLAN:    · Dressings changed  There is a new spot of bleeding at the lateral edge of the TMA incision  All sutures remain intact and there is no pedro dehiscence, however there is concern for dehiscence developing due to patient's continual noncompliance with NWB  · Patient pending rehab placement, however is now requesting to go home with home health care  · Continue local wound care, xeroform, DSD  Appreciate nursing assistance with dressing changes  · Elevation and offloading on green foam wedges or pillows when non-ambulatory  · Rest of care per primary team      Weightbearing status: NWB to RLE    SUBJECTIVE:     The patient was seen, evaluated, and assessed at bedside today  The patient was awake, alert, and in no acute distress  No acute events overnight  Upon entering the patient's room she is found to be actively standing on the operative right foot with only the dressings and a sock on the foot, about to walk to the bathroom  She behaves as if this is normal activity for her (standing & walking on the R foot) despite being fully aware that she is to be NWB to the R foot and fully understanding the risks of weight bearing  The patient reports no pain to the right foot  Patient denies N/V/F/chills/SOB/CP        OBJECTIVE:     Vitals:   /60   Pulse 68   Temp (!) 97 4 °F (36 3 °C)   Resp 17   Ht 5' 6" (1 676 m)   Wt 122 kg (268 lb 15 4 oz)   LMP 2016 (Exact Date)   SpO2 97%   BMI 43 41 kg/m²     Temp (24hrs), Av 2 °F (36 8 °C), Min:97 4 °F (36 3 °C), Max:99 °F (37 2 °C)      Physical Exam:     General: Alert, cooperative and no distress  Lungs: Non labored breathing  Abdomen: Soft, non-tender  Lower extremity exam:  Cardiovascular status at baseline  Neurological status at baseline  Musculoskeletal status at baseline s/p R TMA  No calf tenderness noted  R TMA site: All sutures remain intact  Lateral aspect of incision noted to have new sanguinous drainage, without pedro dehiscence on exam  Skin edges otherwise well coapted across TMA site  No erythema, no edema, no lymphangitis, no fluctuance on palpation  Clinical Images 01/12/22:    R TMA      R TMA, lateral aspect      Additional Data:     Labs:    Results from last 7 days   Lab Units 01/09/22  0458   WBC Thousand/uL 6 60   HEMOGLOBIN g/dL 7 7*   HEMATOCRIT % 23 7*   PLATELETS Thousands/uL 188   NEUTROS PCT % 67   LYMPHS PCT % 16   MONOS PCT % 9   EOS PCT % 6     Results from last 7 days   Lab Units 01/09/22  0458   POTASSIUM mmol/L 4 9   CHLORIDE mmol/L 103   CO2 mmol/L 29   BUN mg/dL 58*   CREATININE mg/dL 5 47*   CALCIUM mg/dL 8 8     Results from last 7 days   Lab Units 01/10/22  1418   INR  1 87*       * I Have Reviewed All Lab Data Listed Above  Imaging: I have personally reviewed pertinent films in PACS  EKG, Pathology, and Other Studies: I have personally reviewed pertinent reports  ** Please Note: Portions of the record may have been created with voice recognition software  Occasional wrong word or "sound a like" substitutions may have occurred due to the inherent limitations of voice recognition software  Read the chart carefully and recognize, using context, where substitutions have occurred   **

## 2022-01-12 NOTE — DISCHARGE SUMMARY
[facility] [facility]  Discharge- Leonila Narayan 1965, 49 y o  female MRN: [id number]  Unit/Bed#: [facility] 933-01 Encounter: [id number]  Primary Care Provider: Clinton Hernandez MD   Date and time admitted to hospital: 12/20/2021  3:07 PM    * Right foot ulceration with osteomyelitis  Assessment & Plan  Status post transmetatarsal amputation with Podiatry on 12/26/ 21  Patient nwo monitor off antibiotics Infectious Disease input noted  Podiatry input noted patient is noncompliant with nonweightbearing status and this was discussed with the patient and strongly recommended nonweightbearing status for proper healing  Physical therapy  Pending placement to [facility]  Patient declining rehab, wants to go home  Cleared by Podiatry        History of venous thromboembolism  Assessment & Plan  Warfarin for anticoagulation  Goal INR 2-3,  Subtherapeutic INR  Change Coumadin to 10 mg every day except Sunday 12 mg  Patient instructed to follow with previous Coumadin instructions    ESRD on hemodialysis  Assessment & Plan  Routine hemodialysis per nephrology MWF  Continue Renagel/Sensipar supplementation        Constipation  Assessment & Plan  Patient refusing MiraLax  Continue lactulose and senna    Essential hypertension  Assessment & Plan  Monitor blood pressures  Avoid hypotension    Insulin-dependent diabetes mellitus with neuropathy  Assessment & Plan  Lab Results   Component Value Date    HGBA1C 7 9 (H) 09/30/2021     Continue basal/prandial insulin w/ additional SSI coverage per Accu-Cheks  Carbohydrate restricted diet  Continue Gabapentin for neuropathy    Morbid obesity  Assessment & Plan  BMI of 43 41  Lifestyle/diet modifications    Anemia of chronic disease  Assessment & Plan  Monitor hemoglobin, has been stable around 7-8 for the past several days    -patient was transfused 1 unit packed red blood cells on 12/28/2021 and 1/5/2022 with appropriate response in hemoglobin    Continue to monitor    Medical Problems             Resolved Problems  Date Reviewed: 1/12/2022    None              Discharging Physician / Practitioner: Edie Durán DO  PCP: Amparo Lyn MD  Admission Date:   Admission Orders (From admission, onward)     Ordered        12/20/21 1910  Inpatient Admission  Once                      Discharge Date: 01/12/22    Consultations During Hospital Stay:  · PMR  · Acute Pain Service  · Infectious disease  · Nephrology  · Podiatry    Procedures Performed:   · Status post right metatarsal amputation on 12/26/2021    Significant Findings / Test Results:   · As above    Incidental Findings:   · None    Test Results Pending at Discharge (will require follow up): · None     Outpatient Tests Requested:  · Monitor PT INR    Complications:    Non    Reason for Admission:   Right foot ulcer with osteomyelitis    Hospital Course:   Raine Babb is a 49 y o  female patient who originally presented to the hospital on 12/20/2021 due to right foot ulcer  Patient with poorly controlled DM, DPN  She was recently admitted in October for septic arthritis of the right 1st MTPJ  She underwent washout and received 6 weeks of culture guided antibiotics  She states the wound over her right foot almost nearly closed and she has been followed closely by Podiatry as an outpatient  She presented to the ED on 12/20 for several days of increased pain, redness, swelling of the foot as well as increased drainage from her foot wound  Patient evaluated by Podiatry, and Infectious Disease, she underwent right transmetatarsal amputation with Podiatry on 75/92/51 without complication    PT recommending rehab, however patient declined rehab and wants to go home  Cleared by Podiatry to discharge home  Patient will be discharged home to follow-up with her PCP in 1 week and with Podiatry as an outpatient    Please see above list of diagnoses and related plan for additional information       Condition at Discharge: stable    Discharge Day Visit / Exam:   Subjective:    Patient seen and examined  Comfortable sitting in bed  No chest pain or shortness of breath  Wants to go home  Vitals: Blood Pressure: 128/62 (01/12/22 1235)  Pulse: 69 (01/12/22 1235)  Temperature: 97 5 °F (36 4 °C) (01/12/22 1235)  Temp Source: Axillary (01/12/22 1235)  Respirations: 16 (01/12/22 1235)  Height: 5' 6" (167 6 cm) (12/20/21 1437)  Weight - Scale: 122 kg (268 lb 15 4 oz) (12/20/21 1437)  SpO2: 97 % (01/11/22 2102)  Exam:   Physical Exam   Patient is awake alert in no acute distress  Lung clear to auscultation bilateral  Heart positive S1 S2 no murmur  Abdomen soft nontender  Lower extremities with trace edema  Right foot TMA wrapped with dry dressing      Discharge instructions/Information to patient and family:   See after visit summary for information provided to patient and family  Provisions for Follow-Up Care:  See after visit summary for information related to follow-up care and any pertinent home health orders  Disposition:   Home with [facility] Services (Reminder: Complete face to face encounter)    Planned Readmission: no     Discharge Statement:  I spent 40  minutes discharging the patient  This time was spent on the day of discharge  I had direct contact with the patient on the day of discharge  Greater than 50% of the total time was spent examining patient, answering all patient questions, arranging and discussing plan of care with patient as well as directly providing post-discharge instructions  Additional time then spent on discharge activities  Discharge Medications:  See after visit summary for reconciled discharge medications provided to patient and/or family        **Please Note: This note may have been constructed using a voice recognition system**

## 2022-01-12 NOTE — ASSESSMENT & PLAN NOTE
Status post transmetatarsal amputation with Podiatry on 12/26/ 21  Patient nwo monitor off antibiotics Infectious Disease input noted  Podiatry input noted patient is noncompliant with nonweightbearing status and this was discussed with the patient and strongly recommended nonweightbearing status for proper healing  Physical therapy  Pending placement to [facility]  Patient declining rehab, wants to go home  Cleared by Podiatry

## 2022-01-12 NOTE — ASSESSMENT & PLAN NOTE
Warfarin for anticoagulation  Goal INR 2-3,  Subtherapeutic INR  Change Coumadin to 10 mg every day except Sunday 12 mg  Patient instructed to follow with previous Coumadin instructions

## 2022-01-12 NOTE — OCCUPATIONAL THERAPY NOTE
Occupational Therapy Cancel Note         Patient Name: Whit Novoa  [id number] Date: 1/12/2022 01/12/22 1101   OT Last Visit   OT Visit Date 01/12/22   Note Type   Note Type Cancelled Session   Cancel Reasons Patient off floor/test         OT orders received  Chart reviewed  Attempted to see pt 2x this AM; however, pt currently off floor  Will continue to follow and see pt as appropriate and able       Ava Cardoza MS, OTR/L

## 2022-01-12 NOTE — PHYSICAL THERAPY NOTE
Physical Therapy Cancellation Note       01/12/22 1117   PT Last Visit   PT Visit Date 01/12/22   Note Type   Note Type Cancelled Session   Cancel Reasons Patient off floor/test     Pt chart reviewed  Pt currently off floor at HD and unable to participate in therapy session  PT to continue to follow and see pt as appropriate and able         Holley Rodrigez, PT, DPT

## 2022-01-12 NOTE — DISCHARGE INSTRUCTIONS
· Continue local wound care, xeroform, DSD  · Elevation on green foam wedges or pillows when non-ambulatory

## 2022-01-12 NOTE — PLAN OF CARE
Post-Dialysis RN Treatment Note    Blood Pressure:  Pre 159/102 mm/Hg  Post 128/62 mmHg   EDW  121 kg    Weight:  Pre 131 2 kg   Post 127 7 kg   Mode of weight measurement: Standing Scale   Volume Removed  4000 ml    Treatment duration 210 minutes    NS given  Yes, 200 ml to assess for clotting    Treatment shortened? No   Medications given during Rx None Reported   Estimated Kt/V  1 11   Access type: AV fistula   Access Issues: BFR was decreased and maintained at 300 d/t increase in , needle was repositioned and flushed easily  Report called to primary nurse  Yes, Meme Speaks RN    Plan for 3 5 hour HD treatment with 4 kg fluid removal as tolerated      Problem: METABOLIC, FLUID AND ELECTROLYTES - ADULT  Goal: Electrolytes maintained within normal limits  Description: INTERVENTIONS:  - Monitor labs and assess patient for signs and symptoms of electrolyte imbalances  - Administer electrolyte replacement as ordered  - Monitor response to electrolyte replacements, including repeat lab results as appropriate  - Instruct patient on fluid and nutrition as appropriate  Outcome: Progressing  Goal: Fluid balance maintained  Description: INTERVENTIONS:  - Monitor labs   - Monitor I/O and WT  - Instruct patient on fluid and nutrition as appropriate  - Assess for signs & symptoms of volume excess or deficit  Outcome: Progressing

## 2022-01-12 NOTE — PROGRESS NOTES
NEPHROLOGY PROGRESS NOTE    Yossi Reynaga 49 y o  female MRN: [id number]  [facility] Encounter: [id number]  Reason for Consult:  End-stage renal disease    Patient was seen while on dialysis she was awake and alert in good spirits and told me she is going to be going home today  Other than that no acute complaints she does have support to help with her foot  ASSESSMENT/PLAN:  1  Renal    The patient is end-stage renal disease labs reviewed from today and she does have mild hyperkalemia with a potassium of 5 2  Hemoglobin is 8 1 as she has anemia due to end-stage renal disease  She does not receive Epogen due to history of PE  I was informed that the patient is going to be discharged to home so her normal outpatient dialysis schedule is Tuesday Thursday Saturday  She had been switched to Monday Wednesday Friday in anticipation of going to a rehab center that required dialysis on those days  I informed the patient that she is to go to dialysis tomorrow to stay on her normal schedule in little also help reduce some of her volume overload  I did call the outpatient dialysis facility and told him she will be there tomorrow  Details of today's treatment outline below  HEMODIALYSIS PROCEDURE NOTE  The patient was seen and examined on hemodialysis  Time: 3 5 hours  Sodium: 138 Blood flow: 400   Dialyzer: F160 Potassium: 2 Dialysate flow: 800   Access: AV Bicarbonate: 35 Ultrafiltration goal: 4 L as tolerated  Monitor hemodynamically  Continue medications for secondary hyperparathyroidism    Again patient was instructed to go to her normal dialysis treatment tomorrow Thursday 01/13/2022     2  Status post right transmetatarsal amputation  Podiatry to follow-up  Had osteomyelitis  This point no antibiotics are ordered will continue with wound care and Podiatry follow-up  I communicated with primary team and she is stable for discharge      SUBJECTIVE:  Review of Systems   Constitutional: Negative for chills, fever, malaise/fatigue and night sweats  HENT: Negative  Eyes: Negative  Cardiovascular: Positive for leg swelling  Negative for chest pain, dyspnea on exertion and orthopnea  Respiratory: Negative  Negative for cough, shortness of breath, sputum production and wheezing  Gastrointestinal: Negative for abdominal pain, diarrhea, nausea and vomiting  Neurological: Negative for dizziness, focal weakness, headaches and weakness  Psychiatric/Behavioral: Negative for altered mental status, depression, hallucinations and hypervigilance  OBJECTIVE:  Current Weight: Weight - Scale: 122 kg (268 lb 15 4 oz)  Vitals:Temp (24hrs), Av °F (36 7 °C), Min:97 4 °F (36 3 °C), Max:99 °F (37 2 °C)  Current: Temperature: 97 5 °F (36 4 °C)   Blood pressure 169/68, pulse 65, temperature 97 5 °F (36 4 °C), temperature source Oral, resp  rate 16, height 5' 6" (1 676 m), weight 122 kg (268 lb 15 4 oz), last menstrual period 2016, SpO2 97 %, not currently breastfeeding  , Body mass index is 43 41 kg/m²  Intake/Output Summary (Last 24 hours) at 2022 1118  Last data filed at 2022 1020  Gross per 24 hour   Intake 1000 ml   Output --   Net 1000 ml       Physical Exam: /68   Pulse 65   Temp 97 5 °F (36 4 °C) (Oral)   Resp 16   Ht 5' 6" (1 676 m)   Wt 122 kg (268 lb 15 4 oz)   LMP 2016 (Exact Date)   SpO2 97%   BMI 43 41 kg/m²   Physical Exam  Constitutional:       General: She is not in acute distress  Appearance: She is not toxic-appearing or diaphoretic  HENT:      Head: Normocephalic and atraumatic  Nose:      Comments: Wearing mask     Mouth/Throat:      Comments: Wearing mask  Eyes:      General: No scleral icterus  Extraocular Movements: Extraocular movements intact  Cardiovascular:      Rate and Rhythm: Normal rate and regular rhythm  Heart sounds: No friction rub  No gallop         Comments: Positive edema  Pulmonary:      Effort: Pulmonary effort is normal  No respiratory distress  Breath sounds: Normal breath sounds  No wheezing, rhonchi or rales  Abdominal:      General: Bowel sounds are normal  There is no distension  Palpations: Abdomen is soft  Tenderness: There is no abdominal tenderness  There is no rebound  Musculoskeletal:      Cervical back: Normal range of motion  Comments: Right foot with dressing over TMA location  Neurological:      General: No focal deficit present  Mental Status: She is alert and oriented to person, place, and time  Mental status is at baseline  Psychiatric:         Mood and Affect: Mood normal          Behavior: Behavior normal          Thought Content:  Thought content normal          Judgment: Judgment normal          Medications:    Current Facility-Administered Medications:     acetaminophen (TYLENOL) tablet 975 mg, 975 mg, Oral, Q8H [address], Rhoderick Lipoma, DO, 975 mg at 01/12/22 0557    albuterol (PROVENTIL HFA,VENTOLIN HFA) inhaler 2 puff, 2 puff, Inhalation, Q6H PRN, Flor Mage, DPM    ALPRAZolam Christin Room) tablet 0 25 mg, 0 25 mg, Oral, 4x Daily PRN, Flor Mage, DPM, 0 25 mg at 01/12/22 0801    ammonium lactate (LAC-HYDRIN) 12 % cream, , Topical, BID PRN, Flor Mage, DPM, Given at 12/21/21 2207    calcium carbonate (TUMS) chewable tablet 500 mg, 500 mg, Oral, TID PRN, Cielo Montiel MD, 500 mg at 01/11/22 2102    carvedilol (COREG) tablet 25 mg, 25 mg, Oral, BID With Meals, [place] Mage, DPM, 25 mg at 01/11/22 1709    cinacalcet (SENSIPAR) tablet 30 mg, 30 mg, Oral, Daily, [place] Mage, DPM, 30 mg at 01/11/22 1522    Diclofenac Sodium (VOLTAREN) 1 % topical gel 2 g, 2 g, Topical, 4x Daily, Cielo Montiel MD, 2 g at 01/12/22 0801    gabapentin (NEURONTIN) capsule 100 mg, 100 mg, Oral, TID, Flor Mage, DPM, 100 mg at 01/12/22 0800    hydrALAZINE (APRESOLINE) injection 5 mg, 5 mg, Intravenous, Q6H PRN, [place] Mage, DPM, 5 mg at 12/21/21 0047    insulin glargine (LANTUS) subcutaneous injection 10 Units 0 1 mL, 10 Units, Subcutaneous, HS, Cathryne Sicard, DO, 10 Units at 01/11/22 2109    insulin lispro (HumaLOG) 100 units/mL subcutaneous injection 1-5 Units, 1-5 Units, Subcutaneous, TID AC, 1 Units at 01/10/22 1611 **AND** Fingerstick Glucose (POCT), , , TID AC, Eddie Naomie, DPM    insulin lispro (HumaLOG) 100 units/mL subcutaneous injection 3 Units, 3 Units, Subcutaneous, TID With Meals, Eddie Llamass, DPM, 3 Units at 01/12/22 0808    lactulose oral solution 20 g, 20 g, Oral, BID, Cathryne Sicard, DO, 20 g at 01/11/22 1709    levothyroxine tablet 50 mcg, 50 mcg, Oral, Daily, Eddie Akbar, DPM, 50 mcg at 01/12/22 0557    lidocaine (LIDODERM) 5 % patch 1 patch, 1 patch, Topical, Daily, Yanely Woodruff MD, 1 patch at 01/11/22 0841    loperamide (IMODIUM) capsule 2 mg, 2 mg, Oral, TID PRN, Igor Barone PA-C, 2 mg at 01/11/22 2103    melatonin tablet 3 mg, 3 mg, Oral, HS, Eddie Akbar, DPM, 3 mg at 01/11/22 2103    metoclopramide (REGLAN) tablet 10 mg, 10 mg, Oral, Q6H PRN, Linda Solis MD    neomycin-bacitracin-polymyxin b (NEOSPORIN) ointment 1 small application, 1 small application, Topical, BID, Ferdinand Cardona DO, 1 small application at 59/46/22 0800    NIFEdipine (PROCARDIA XL) 24 hr tablet 30 mg, 30 mg, Oral, Daily, Eddie Akbar, DPM, 30 mg at 01/11/22 0843    nystatin (MYCOSTATIN) powder, , Topical, BID, Eddie Akbar, DPM, Given at 01/11/22 1704    ondansetron (ZOFRAN-ODT) dispersible tablet 4 mg, 4 mg, Oral, Q6H PRN, Linda Solis MD, 4 mg at 01/06/22 1949    oxyCODONE (ROXICODONE) IR tablet 2 5 mg, 2 5 mg, Oral, Q4H PRN **OR** oxyCODONE (ROXICODONE) IR tablet 5 mg, 5 mg, Oral, Q4H PRN, Ferdinand Cardona DO, 5 mg at 01/12/22 0556    pantoprazole (PROTONIX) EC tablet 40 mg, 40 mg, Oral, Early Morning, Eddie Akbar DPM, 40 mg at 01/12/22 0557    senna (SENOKOT) tablet 17 2 mg, 2 tablet, Oral, HS, Cathryne Sicard, DO    sertraline (ZOLOFT) tablet 75 mg, 75 mg, Oral, Daily, Eddie Naomie, DPM, 75 mg at 01/12/22 0800    sevelamer (RENAGEL) tablet 1,600 mg, 1,600 mg, Oral, TID With Meals, Eddie Naomie, DPM, 1,600 mg at 01/12/22 0800    torsemide (DEMADEX) tablet 100 mg, 100 mg, Oral, Daily, Letcher Naomie, DPM, 100 mg at 01/11/22 0841    warfarin (COUMADIN) tablet 10 mg, 10 mg, Oral, Once per day on Mon Tue Wed Thu Fri Sat, Cathryne Sicard, DO, 10 mg at 01/11/22 1709    Laboratory Results:  Lab Results   Component Value Date    WBC 6 67 01/12/2022    HGB 8 1 (L) 01/12/2022    HCT 25 1 (L) 01/12/2022    MCV 95 01/12/2022     01/12/2022     Lab Results   Component Value Date    SODIUM 136 01/12/2022    K 5 2 01/12/2022     01/12/2022    CO2 25 01/12/2022    BUN 80 (H) 01/12/2022    CREATININE 7 64 (H) 01/12/2022    GLUC 151 (H) 01/12/2022    CALCIUM 8 7 01/12/2022     Lab Results   Component Value Date    CALCIUM 8 7 01/12/2022    PHOS 4 1 12/21/2021     No results found for: LABPROT

## 2022-01-14 ENCOUNTER — TELEPHONE (OUTPATIENT)
Dept: PODIATRY | Facility: CLINIC | Age: 57
End: 2022-01-14

## 2022-01-17 ENCOUNTER — TELEPHONE (OUTPATIENT)
Dept: PODIATRY | Facility: CLINIC | Age: 57
End: 2022-01-17

## 2022-01-17 NOTE — TELEPHONE ENCOUNTER
Dr Keyana Clarke is out of the office until 1/19/22, Melquiades Adames has an appt  that same day @1:30pm  This can be discuss at that time

## 2022-01-19 ENCOUNTER — OFFICE VISIT (OUTPATIENT)
Dept: PODIATRY | Facility: CLINIC | Age: 57
End: 2022-01-19

## 2022-01-19 VITALS
SYSTOLIC BLOOD PRESSURE: 156 MMHG | WEIGHT: 266.76 LBS | BODY MASS INDEX: 43.06 KG/M2 | DIASTOLIC BLOOD PRESSURE: 71 MMHG | HEART RATE: 71 BPM

## 2022-01-19 DIAGNOSIS — Z98.890 S/P FOOT SURGERY, RIGHT: Primary | ICD-10-CM

## 2022-01-19 PROCEDURE — 99024 POSTOP FOLLOW-UP VISIT: CPT | Performed by: PODIATRIST

## 2022-01-19 NOTE — PROGRESS NOTES
This patient was seen on 1/19/22  Chief Complaint:  Sabino Mercedes is here for a post-op visit  Subjective:      Patient ID: Sabino Mercedes is a 49 y o  female  Rosa Isela Araujo is here for a post-op visit  The following portions of the patient's history were reviewed and updated as appropriate: allergies, current medications, past family history, past medical history, past social history, past surgical history and problem list     Review of Systems   Constitutional: Negative  Objective:    Sabino Mercedes appears stable, non-toxic and alert  /71   Pulse 71   Wt 121 kg (266 lb 12 1 oz) Comment: NWB, verbal  LMP 02/12/2016 (Exact Date)   BMI 43 06 kg/m²     Foot/Ankle Musculoskeletal Exam    General      Neurological: alert       Physical Exam  Constitutional:       General: She is not in acute distress  Appearance: Normal appearance  She is not ill-appearing, toxic-appearing or diaphoretic  Neurological:      Mental Status: She is alert  The incision is well-coapted without dehiscence, signs of infection, active drainage, necrosis  Calf is soft and non-tender  Neurovascular status is intact to the foot  Normal post-op edema and bruising is noted  Diagnoses and all orders for this visit:    S/P foot surgery, right         Problem List Items Addressed This Visit        Other    S/P foot surgery, right - Primary          Assessment:  Healing post-op site  Plan:  50% of sutures removed  Patient to remain NWB and with DSD and to return in two weeks for remaining sutures to be removed

## 2022-01-27 ENCOUNTER — OFFICE VISIT (OUTPATIENT)
Dept: OBGYN CLINIC | Facility: HOSPITAL | Age: 57
End: 2022-01-27
Payer: MEDICARE

## 2022-01-27 ENCOUNTER — HOSPITAL ENCOUNTER (OUTPATIENT)
Dept: RADIOLOGY | Facility: HOSPITAL | Age: 57
Discharge: HOME | End: 2022-01-27
Payer: MEDICARE

## 2022-01-27 VITALS
HEART RATE: 79 BPM | SYSTOLIC BLOOD PRESSURE: 148 MMHG | WEIGHT: 266 LBS | HEIGHT: 66 IN | BODY MASS INDEX: 42.75 KG/M2 | DIASTOLIC BLOOD PRESSURE: 74 MMHG

## 2022-01-27 DIAGNOSIS — M75.02 ADHESIVE CAPSULITIS OF LEFT SHOULDER: ICD-10-CM

## 2022-01-27 DIAGNOSIS — M25.512 LEFT SHOULDER PAIN, UNSPECIFIED CHRONICITY: Primary | ICD-10-CM

## 2022-01-27 DIAGNOSIS — M25.512 LEFT SHOULDER PAIN, UNSPECIFIED CHRONICITY: ICD-10-CM

## 2022-01-27 PROCEDURE — 73030 X-RAY EXAM OF SHOULDER: CPT

## 2022-01-27 PROCEDURE — 99213 OFFICE O/P EST LOW 20 MIN: CPT | Performed by: PHYSICIAN ASSISTANT

## 2022-01-27 PROCEDURE — 20610 DRAIN/INJ JOINT/BURSA W/O US: CPT | Performed by: PHYSICIAN ASSISTANT

## 2022-01-27 RX ORDER — LIDOCAINE HYDROCHLORIDE 10 MG/ML
4 INJECTION, SOLUTION EPIDURAL; INFILTRATION; INTRACAUDAL; PERINEURAL
Status: COMPLETED | OUTPATIENT
Start: 2022-01-27 | End: 2022-01-27

## 2022-01-27 RX ORDER — BETAMETHASONE SODIUM PHOSPHATE AND BETAMETHASONE ACETATE 3; 3 MG/ML; MG/ML
6 INJECTION, SUSPENSION INTRA-ARTICULAR; INTRALESIONAL; INTRAMUSCULAR; SOFT TISSUE
Status: COMPLETED | OUTPATIENT
Start: 2022-01-27 | End: 2022-01-27

## 2022-01-27 RX ADMIN — LIDOCAINE HYDROCHLORIDE 4 ML: 10 INJECTION, SOLUTION EPIDURAL; INFILTRATION; INTRACAUDAL; PERINEURAL at 15:30

## 2022-01-27 RX ADMIN — BETAMETHASONE SODIUM PHOSPHATE AND BETAMETHASONE ACETATE 6 MG: 3; 3 INJECTION, SUSPENSION INTRA-ARTICULAR; INTRALESIONAL; INTRAMUSCULAR; SOFT TISSUE at 15:30

## 2022-01-27 NOTE — PROGRESS NOTES
Assessment:    Left frozen shoulder        Plan:    Steroid injection provided to the left shoulder today and she tolerated this well   Ice to the area   Initiate physical therapy for range of motion (already has home health set up)  She wishes to follow up on a p r n  basis some let us know if her symptoms   Do not improve in the future  Problem List Items Addressed This Visit     None      Visit Diagnoses     Left shoulder pain, unspecified chronicity    -  Primary    Relevant Orders    XR shoulder 2+ vw left                   Subjective:     Patient ID:  Caitlin Antonio is a 49 y o  female    HPI     72-year-old female  Presenting for evaluation of her left shoulder  According to the patient, she was recently hospitalized for about one month for a foot issue and at some point about three weeks ago she thinks she fell out of bed in the hospital    She did not experience any shoulder pain at that time after discharge from the hospital about two weeks ago she noticed atraumatic left shoulder pain and stiffness with any range of motion  There was no acute injury that caused this that she is aware of  No associated numbness and tingling  Pain does not radiate it is localized to the anterior lateral shoulder  The following portions of the patient's history were reviewed and updated as appropriate: allergies, current medications, past family history, past medical history, past social history, past surgical history and problem list     Review of Systems     Objective:    Imaging:  Left shoulder x-rays demonstrate acute fracture or dislocation  Vitals:    01/27/22 1438   BP: 148/74   Pulse: 79           Physical Exam     Orthopedic Examination:  Left shoulder    Inspection: no open wounds or erythema, no effusion  No ecchymosis  Shoulders are symmetric  No Allan deformity    Palpation:  No warmth  Biceps nontender to palpation  acromion region mildly tender to palpation    Clavicle and scapula nontender to palpation    Range-of-motion: 90 degrees of shoulder abduction and forward flexion, pain with internal and external rotation limited range of motion    Strength: 5/5 deltoid supraspinatus infraspinatus subscapularis    Sensation: intact axillary muscular cutaneous median radial ulnar nerve distribution    Special Tests:   Negative drop-arm   Positive impingement   Negative empty can   Negative cross-body   negative hook test   Negative speed's   Negative Rowan's    Large joint arthrocentesis: L subacromial bursa  Universal Protocol:  Consent: Verbal consent obtained    Consent given by: patient  Patient identity confirmed: verbally with patient    Supporting Documentation  Indications: pain   Procedure Details  Location: shoulder - L subacromial bursa  Needle size: 22 G  Ultrasound guidance: no  Approach: lateral  Medications administered: 4 mL lidocaine (PF) 1 %; 6 mg betamethasone acetate-betamethasone sodium phosphate 6 (3-3) mg/mL    Patient tolerance: patient tolerated the procedure well with no immediate complications  Dressing:  Sterile dressing applied

## 2022-02-01 ENCOUNTER — TELEPHONE (OUTPATIENT)
Dept: OBGYN CLINIC | Facility: HOSPITAL | Age: 57
End: 2022-02-01

## 2022-02-01 NOTE — TELEPHONE ENCOUNTER
Patient seen PA [facility]  Patient is calling in stating that she is in a lot of pain and is asking for an MRI to be completed and wanted to be seen   Provided patient with an appointment to be seen with a

## 2022-02-02 ENCOUNTER — TELEPHONE (OUTPATIENT)
Dept: GASTROENTEROLOGY | Facility: HOSPITAL | Age: 57
End: 2022-02-02

## 2022-02-02 ENCOUNTER — OFFICE VISIT (OUTPATIENT)
Dept: PODIATRY | Facility: CLINIC | Age: 57
End: 2022-02-02

## 2022-02-02 VITALS
DIASTOLIC BLOOD PRESSURE: 66 MMHG | BODY MASS INDEX: 42.65 KG/M2 | HEIGHT: 66 IN | SYSTOLIC BLOOD PRESSURE: 146 MMHG | HEART RATE: 75 BPM | WEIGHT: 265.4 LBS

## 2022-02-02 DIAGNOSIS — Z98.890 S/P FOOT SURGERY, RIGHT: Primary | ICD-10-CM

## 2022-02-02 PROCEDURE — 99024 POSTOP FOLLOW-UP VISIT: CPT | Performed by: PODIATRIST

## 2022-02-03 ENCOUNTER — TELEPHONE (OUTPATIENT)
Dept: PODIATRY | Facility: CLINIC | Age: 57
End: 2022-02-03

## 2022-02-03 NOTE — TELEPHONE ENCOUNTER
Phone call to pt  Per Dr Keyana Clarke- no need for home care nurses to change dressings  Incision is healed, no open areas  Instructed pt that she may remove the dry dressing tomorrow, if no drainage, no dressing required  If pt notices drainage, keep wound covered  with gauze and call office  Pt verbalizes understanding

## 2022-02-03 NOTE — PROGRESS NOTES
This patient was seen on 2/2/22  Chief Complaint:  Cecilia Walsh is here for a post-op visit  Subjective:      Patient ID: Cecilia Walsh is a 49 y o  female  Lauralyn Rank is here for a post-op visit  The following portions of the patient's history were reviewed and updated as appropriate: allergies, current medications, past family history, past medical history, past social history, past surgical history and problem list     Review of Systems   Constitutional: Negative  Objective:    Cecilia Walsh appears stable, non-toxic and alert  /66   Pulse 75   Ht 5' 6" (1 676 m) Comment: verbal  Wt 120 kg (265 lb 6 4 oz)   LMP 02/12/2016 (Exact Date)   BMI 42 84 kg/m²     Foot/Ankle Musculoskeletal Exam    General      Neurological: alert       Physical Exam  Constitutional:       General: She is not in acute distress  Appearance: Normal appearance  She is not ill-appearing, toxic-appearing or diaphoretic  Neurological:      Mental Status: She is alert  The incision is well-coapted without dehiscence, signs of infection, active drainage, necrosis  Calf is soft and non-tender  Neurovascular status is intact to the foot  Normal post-op edema and bruising is noted  Diagnoses and all orders for this visit:    S/P foot surgery, right         Problem List Items Addressed This Visit        Other    S/P foot surgery, right - Primary          Assessment:  Healing post-op site  Plan:  Sutures removed  May shower  May walk in boot  Follow-up 2 weeks

## 2022-02-03 NOTE — TELEPHONE ENCOUNTER
Lawrence Ken called, she wanted to know if orders were sent over to [address] [place] [address]'s [facility] to come in and change her dressing every day

## 2022-02-07 ENCOUNTER — TELEPHONE (OUTPATIENT)
Dept: PODIATRY | Facility: CLINIC | Age: 57
End: 2022-02-07

## 2022-02-08 NOTE — TELEPHONE ENCOUNTER
Phone call to Fady Sandoval states she saw pt and the incision is healed, no open areas, no drainage  Pt will be discharged by home care on Friday  No wound care orders needed

## 2022-02-11 ENCOUNTER — TELEPHONE (OUTPATIENT)
Dept: PODIATRY | Facility: CLINIC | Age: 57
End: 2022-02-11

## 2022-02-15 ENCOUNTER — TREATMENT (OUTPATIENT)
Dept: PODIATRY | Facility: CLINIC | Age: 57
End: 2022-02-15
Payer: MEDICARE

## 2022-02-15 DIAGNOSIS — L97.518 ULCER OF RIGHT FOOT WITH OTHER SEVERITY (HCC): ICD-10-CM

## 2022-02-15 DIAGNOSIS — E11.42 DIABETIC POLYNEUROPATHY ASSOCIATED WITH TYPE 2 DIABETES MELLITUS (HCC): Primary | ICD-10-CM

## 2022-02-15 PROCEDURE — G0179 MD RECERTIFICATION HHA PT: HCPCS | Performed by: PODIATRIST

## 2022-02-16 ENCOUNTER — OFFICE VISIT (OUTPATIENT)
Dept: PODIATRY | Facility: CLINIC | Age: 57
End: 2022-02-16

## 2022-02-16 VITALS
DIASTOLIC BLOOD PRESSURE: 63 MMHG | HEART RATE: 74 BPM | BODY MASS INDEX: 43.58 KG/M2 | SYSTOLIC BLOOD PRESSURE: 118 MMHG | WEIGHT: 270 LBS

## 2022-02-16 DIAGNOSIS — E11.42 DIABETIC POLYNEUROPATHY ASSOCIATED WITH TYPE 2 DIABETES MELLITUS (HCC): ICD-10-CM

## 2022-02-16 DIAGNOSIS — Z89.421 HISTORY OF AMPUTATION OF LESSER TOE OF RIGHT FOOT (HCC): ICD-10-CM

## 2022-02-16 DIAGNOSIS — E11.42 DIABETIC POLYNEUROPATHY ASSOCIATED WITH TYPE 2 DIABETES MELLITUS (HCC): Primary | ICD-10-CM

## 2022-02-16 PROBLEM — L97.519 FOOT ULCER, RIGHT (HCC): Status: RESOLVED | Noted: 2021-12-20 | Resolved: 2022-02-16

## 2022-02-16 PROCEDURE — 99024 POSTOP FOLLOW-UP VISIT: CPT | Performed by: PODIATRIST

## 2022-02-16 RX ORDER — UREA 40 %
CREAM (GRAM) TOPICAL DAILY
Qty: 85 G | Refills: 2 | Status: SHIPPED | OUTPATIENT
Start: 2022-02-16 | End: 2022-02-18

## 2022-02-16 NOTE — PROGRESS NOTES
This patient was seen on 2/16/22  Chief Complaint:  Lynda Platt is here for a post-op visit  Subjective:      Patient ID: Lynda Platt is a 49 y o  female  Johnathon Sachis is here for a post-op visit  The following portions of the patient's history were reviewed and updated as appropriate: allergies, current medications, past family history, past medical history, past social history, past surgical history and problem list     Review of Systems   Constitutional: Negative  Objective:    Lynda Platt appears stable, non-toxic and alert  /63   Pulse 74   Wt 122 kg (270 lb)   LMP 02/12/2016 (Exact Date)   BMI 43 58 kg/m²     Foot/Ankle Musculoskeletal Exam    General      Neurological: alert       Physical Exam  Constitutional:       General: She is not in acute distress  Appearance: Normal appearance  She is not ill-appearing, toxic-appearing or diaphoretic  Neurological:      Mental Status: She is alert  The incision is well-coapted without dehiscence, signs of infection, active drainage, necrosis  Calf is soft and non-tender  Neurovascular status is intact to the foot  Normal post-op edema and bruising is noted  Diagnoses and all orders for this visit:    Diabetic polyneuropathy associated with type 2 diabetes mellitus ([address] )  -     Diabetic Shoe Inserts  -     Diabetic Shoe  -     urea (CARMOL) 40 %;  Apply topically daily    History of amputation of lesser toe of right foot ([address] )  -     Diabetic Shoe Inserts  -     Diabetic Shoe         Problem List Items Addressed This Visit        Endocrine    Diabetic polyneuropathy associated with type 2 diabetes mellitus ([address] ) - Primary    Relevant Medications    urea (CARMOL) 40 %    Other Relevant Orders    Diabetic Shoe Inserts    Diabetic Shoe       Other    History of amputation of lesser toe of right foot ([address] )    Relevant Orders    Diabetic Shoe Inserts    Diabetic Shoe          Assessment:  Healed post-op site     Plan:  Rx given for partial foot filler and custom DM shoes  She will reappoint here once she has the shoegear

## 2022-02-18 RX ORDER — UREA 40 %
CREAM (GRAM) TOPICAL
Qty: 85 G | Refills: 2 | Status: SHIPPED | OUTPATIENT
Start: 2022-02-18

## 2022-02-23 ENCOUNTER — TELEPHONE (OUTPATIENT)
Dept: OBGYN CLINIC | Facility: OTHER | Age: 57
End: 2022-02-23

## 2022-02-23 NOTE — TELEPHONE ENCOUNTER
Patient had a missed call to reschedule appointment      I placed on hold to call Thompson Charles to see about the On Hold slots and call dropped

## 2022-03-08 ENCOUNTER — APPOINTMENT (OUTPATIENT)
Dept: LAB | Facility: CLINIC | Age: 57
End: 2022-03-08
Payer: MEDICARE

## 2022-03-08 DIAGNOSIS — Z79.01 LONG TERM CURRENT USE OF ANTICOAGULANT: ICD-10-CM

## 2022-03-08 DIAGNOSIS — Z86.718 PERSONAL HISTORY OF VENOUS THROMBOSIS AND EMBOLISM: ICD-10-CM

## 2022-03-08 LAB
INR PPP: 1.34 (ref 0.84–1.19)
PROTHROMBIN TIME: 16 SECONDS (ref 11.6–14.5)

## 2022-03-08 PROCEDURE — 85610 PROTHROMBIN TIME: CPT

## 2022-03-08 PROCEDURE — 36415 COLL VENOUS BLD VENIPUNCTURE: CPT

## 2022-03-10 ENCOUNTER — TELEPHONE (OUTPATIENT)
Dept: PODIATRY | Facility: CLINIC | Age: 57
End: 2022-03-10

## 2022-03-10 NOTE — TELEPHONE ENCOUNTER
Phone call to Jose Ma, gave her fax number [phone] to re-fax the form and make it to my attention

## 2022-03-10 NOTE — TELEPHONE ENCOUNTER
Orin Bosworth from [facility] called  She is calling to follow up on the LMN that was faxed to us on March 2, 2022  Please fax LMN to     [phone]  Tel:  [phone]  Thank you!

## 2022-03-14 ENCOUNTER — HOSPITAL ENCOUNTER (OUTPATIENT)
Dept: RADIOLOGY | Facility: HOSPITAL | Age: 57
Discharge: HOME | End: 2022-03-14
Payer: MEDICARE

## 2022-03-14 ENCOUNTER — TELEPHONE (OUTPATIENT)
Dept: OBGYN CLINIC | Facility: MEDICAL CENTER | Age: 57
End: 2022-03-14

## 2022-03-14 ENCOUNTER — OFFICE VISIT (OUTPATIENT)
Dept: OBGYN CLINIC | Facility: HOSPITAL | Age: 57
End: 2022-03-14
Payer: MEDICARE

## 2022-03-14 VITALS
WEIGHT: 270 LBS | HEIGHT: 66 IN | HEART RATE: 86 BPM | DIASTOLIC BLOOD PRESSURE: 87 MMHG | BODY MASS INDEX: 43.39 KG/M2 | SYSTOLIC BLOOD PRESSURE: 122 MMHG

## 2022-03-14 DIAGNOSIS — M17.11 PRIMARY OSTEOARTHRITIS OF RIGHT KNEE: ICD-10-CM

## 2022-03-14 DIAGNOSIS — M25.561 RIGHT KNEE PAIN, UNSPECIFIED CHRONICITY: ICD-10-CM

## 2022-03-14 DIAGNOSIS — R52 PAIN: Primary | ICD-10-CM

## 2022-03-14 DIAGNOSIS — R52 PAIN: ICD-10-CM

## 2022-03-14 PROCEDURE — 99213 OFFICE O/P EST LOW 20 MIN: CPT | Performed by: PHYSICIAN ASSISTANT

## 2022-03-14 PROCEDURE — 73562 X-RAY EXAM OF KNEE 3: CPT

## 2022-03-14 PROCEDURE — 20610 DRAIN/INJ JOINT/BURSA W/O US: CPT | Performed by: PHYSICIAN ASSISTANT

## 2022-03-14 RX ORDER — BETAMETHASONE SODIUM PHOSPHATE AND BETAMETHASONE ACETATE 3; 3 MG/ML; MG/ML
12 INJECTION, SUSPENSION INTRA-ARTICULAR; INTRALESIONAL; INTRAMUSCULAR; SOFT TISSUE
Status: COMPLETED | OUTPATIENT
Start: 2022-03-14 | End: 2022-03-14

## 2022-03-14 RX ORDER — LIDOCAINE HYDROCHLORIDE 10 MG/ML
4 INJECTION, SOLUTION INFILTRATION; PERINEURAL
Status: COMPLETED | OUTPATIENT
Start: 2022-03-14 | End: 2022-03-14

## 2022-03-14 RX ADMIN — LIDOCAINE HYDROCHLORIDE 4 ML: 10 INJECTION, SOLUTION INFILTRATION; PERINEURAL at 09:32

## 2022-03-14 RX ADMIN — BETAMETHASONE SODIUM PHOSPHATE AND BETAMETHASONE ACETATE 12 MG: 3; 3 INJECTION, SUSPENSION INTRA-ARTICULAR; INTRALESIONAL; INTRAMUSCULAR; SOFT TISSUE at 09:32
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
## 2022-03-15 ENCOUNTER — TRANSCRIBE ORDERS (OUTPATIENT)
Dept: LAB | Facility: CLINIC | Age: 57
End: 2022-03-15

## 2022-03-15 ENCOUNTER — APPOINTMENT (OUTPATIENT)
Dept: LAB | Facility: CLINIC | Age: 57
End: 2022-03-15
Payer: MEDICARE

## 2022-03-15 DIAGNOSIS — Z79.01 LONG TERM (CURRENT) USE OF ANTICOAGULANTS: Primary | ICD-10-CM

## 2022-03-15 DIAGNOSIS — M00.9 SEPTIC ARTHRITIS (HCC): ICD-10-CM

## 2022-03-15 LAB
BASOPHILS # BLD AUTO: 0.01 THOUSANDS/ΜL (ref 0–0.1)
BASOPHILS NFR BLD AUTO: 0 % (ref 0–1)
EOSINOPHIL # BLD AUTO: 0 THOUSAND/ΜL (ref 0–0.61)
EOSINOPHIL NFR BLD AUTO: 0 % (ref 0–6)
ERYTHROCYTE [DISTWIDTH] IN BLOOD BY AUTOMATED COUNT: 13.9 % (ref 11.6–15.1)
HCT VFR BLD AUTO: 26.2 % (ref 34.8–46.1)
HGB BLD-MCNC: 9 G/DL (ref 11.5–15.4)
IMM GRANULOCYTES # BLD AUTO: 0.06 THOUSAND/UL (ref 0–0.2)
IMM GRANULOCYTES NFR BLD AUTO: 1 % (ref 0–2)
INR PPP: 2.31 (ref 0.84–1.19)
LYMPHOCYTES # BLD AUTO: 0.58 THOUSANDS/ΜL (ref 0.6–4.47)
LYMPHOCYTES NFR BLD AUTO: 7 % (ref 14–44)
MCH RBC QN AUTO: 30 PG (ref 26.8–34.3)
MCHC RBC AUTO-ENTMCNC: 34.4 G/DL (ref 31.4–37.4)
MCV RBC AUTO: 87 FL (ref 82–98)
MONOCYTES # BLD AUTO: 0.22 THOUSAND/ΜL (ref 0.17–1.22)
MONOCYTES NFR BLD AUTO: 3 % (ref 4–12)
NEUTROPHILS # BLD AUTO: 7.93 THOUSANDS/ΜL (ref 1.85–7.62)
NEUTS SEG NFR BLD AUTO: 89 % (ref 43–75)
NRBC BLD AUTO-RTO: 0 /100 WBCS
PLATELET # BLD AUTO: 215 THOUSANDS/UL (ref 149–390)
PMV BLD AUTO: 10.6 FL (ref 8.9–12.7)
PROTHROMBIN TIME: 24.3 SECONDS (ref 11.6–14.5)
RBC # BLD AUTO: 3 MILLION/UL (ref 3.81–5.12)
VANCOMYCIN SERPL-MCNC: <0.8 UG/ML (ref 10–20)
WBC # BLD AUTO: 8.8 THOUSAND/UL (ref 4.31–10.16)

## 2022-03-15 PROCEDURE — 80202 ASSAY OF VANCOMYCIN: CPT

## 2022-03-15 PROCEDURE — 85025 COMPLETE CBC W/AUTO DIFF WBC: CPT

## 2022-03-15 PROCEDURE — 85610 PROTHROMBIN TIME: CPT

## 2022-03-15 PROCEDURE — 36415 COLL VENOUS BLD VENIPUNCTURE: CPT

## 2022-04-12 ENCOUNTER — TELEPHONE (OUTPATIENT)
Dept: OBGYN CLINIC | Facility: CLINIC | Age: 57
End: 2022-04-12

## 2022-04-12 ENCOUNTER — TELEPHONE (OUTPATIENT)
Dept: OBGYN CLINIC | Facility: HOSPITAL | Age: 57
End: 2022-04-12

## 2022-04-12 NOTE — TELEPHONE ENCOUNTER
Liz Chou PA-c  RE: knee pain   #: [phone]      Patient had to cancel and reschedule appt with Dr Swanson due to not feeling well after dialysis  Next available 5/5/22, patient wanted to see if Jake Arciniega would see her prior to that for a f/u due to her knee pain       Please advise and call patient back

## 2022-04-15 ENCOUNTER — OFFICE VISIT (OUTPATIENT)
Dept: PAIN MEDICINE | Facility: CLINIC | Age: 57
End: 2022-04-15
Payer: MEDICARE

## 2022-04-15 VITALS — BODY MASS INDEX: 43.58 KG/M2 | HEIGHT: 66 IN

## 2022-04-15 DIAGNOSIS — M54.16 LUMBAR RADICULOPATHY: Chronic | ICD-10-CM

## 2022-04-15 DIAGNOSIS — E11.42 DIABETIC POLYNEUROPATHY ASSOCIATED WITH TYPE 2 DIABETES MELLITUS (HCC): ICD-10-CM

## 2022-04-15 DIAGNOSIS — G89.4 CHRONIC PAIN SYNDROME: Primary | ICD-10-CM

## 2022-04-15 PROCEDURE — 99214 OFFICE O/P EST MOD 30 MIN: CPT | Performed by: NURSE PRACTITIONER

## 2022-04-15 RX ORDER — PREGABALIN 50 MG/1
CAPSULE ORAL
Qty: 45 CAPSULE | Refills: 1 | Status: SHIPPED | OUTPATIENT
Start: 2022-04-15 | End: 2022-06-15 | Stop reason: SDUPTHER

## 2022-04-15 NOTE — PROGRESS NOTES
Assessment:  1  Chronic pain syndrome    2  Diabetic polyneuropathy associated with type 2 diabetes mellitus ([facility] [address] )    3  Lumbar radiculopathy        Plan:  1  I will reschedule the patient for an L4-5 LESI  Patient was scheduled in the past but unfortunately the procedure was canceled secondary to the patient being hospitalized  We will 1st request permission for patient to hold Coumadin  If this permission is granted, patient will have PT INR drawn morning before procedure as well  Complete risks and benefits including bleeding, infection, tissue reaction, nerve injury and allergic reaction were discussed  The patient was agreeable and verbalized an understanding  2  I will reinitiate the patient on pregabalin 50 mg daily with an additional supplemental dose of 50 mg on hemodialysis days  I discussed with the patient the type of medication it is, how it works, and that it requires a titration process that is specific to each individual  I reviewed with the patient that it may take 3-4 weeks for the medication's effects to be noticed and that it should never be abruptly stopped  Possible side effects include but are not limited to; vertigo, lethargy, nausea, and edema of the extremities  Advised the patient to call our office if they experience any side effects  The patient verbalized an understanding    3  Patient may be interested in pursuing medical marijuana  A list of certified physician's was provided to the patient today   4  Medication options are quite limited secondary to end-stage renal disease  Patient is aware   5  Patient will continue to follow with podiatry  6  May consider physical therapy once her pain is better controlled  7  Patient will follow-up after her procedure and in 4 weeks or sooner if needed    M*Modal software was used to dictate this note  It may contain errors with dictating incorrect words or incorrect spelling   Please contact the provider directly with any questions  History of Present Illness: The patient is a [address] y o  female with a history of DVT on chronic anticoagulation, end-stage renal disease on hemodialysis and diabetes with neuropathy last seen on 9/13/21 who presents for a follow up office visit in regards to chronic low back pain with paresthesias into the right lower extremities  The patient denies bowel or bladder incontinence or saddle anesthesia  Patient is follow with Orthopedics for knee complaints  MRI of the lumbar spine in February 2021 reveals central stenosis L4-5 secondary to spondylosis and disc herniation  She has failed gabapentin in the past and is unable to take numerous medications secondary to ESRD  Last office visit, she was restarted on pregabalin 50 mg daily, however she states she was hospitalized and needed toes amputated on her right foot and was placed on Percocet and states that the physicians felt that Percocet and pregabalin would be too much for her together therefore they stop the pregabalin  She was also scheduled for an L4-5 LESI, however this was canceled when she was hospitalized  The patient rates her pain a 10/10 on the numeric pain rating scale  Constant as pain in the morning which is described as sharp, throbbing, shooting and pins and needles    I have personally reviewed and/or updated the patient's past medical history, past surgical history, family history, social history, current medications, allergies, and vital signs today  Review of Systems:    Review of Systems   Respiratory: Negative for shortness of breath  Cardiovascular: Negative for chest pain  Gastrointestinal: Positive for nausea  Negative for constipation, diarrhea and vomiting  Musculoskeletal: Positive for gait problem  Negative for arthralgias, joint swelling and myalgias  Skin: Negative for rash  Neurological: Negative for dizziness, seizures and weakness  All other systems reviewed and are negative          Past Medical History:   Diagnosis Date    Abnormal uterine bleeding (AUB)     Anemia     Anxiety     Arthritis     Chronic kidney disease     Claustrophobia     Diabetes mellitus ([facility] [address] )     Disease of thyroid gland     DVT (deep venous thrombosis) (HCC)     End stage kidney disease (HCC)     Foot ulcer due to secondary DM ([facility] [address] )     [facility]     Tues, Th, Sat    Hypertension     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[facility] )     right eye    Morbid obesity ([facility] )     Osteomyelitis of fifth toe of right foot (HCC)     Panic attacks     Pulmonary embolism (HCC)     Reflux esophagitis     Thrombophlebitis of saphenous vein     Warfarin anticoagulation        Past Surgical History:   Procedure Laterality Date    AMPUTATION      r 4th toe    ARTERIOVENOUS GRAFT PLACEMENT      CATARACT EXTRACTION Bilateral     CERVICAL BIOPSY  W/ LOOP ELECTRODE EXCISION       SECTION      DIALYSIS FISTULA CREATION      DILATION AND CURETTAGE OF UTERUS      ENDOMETRIAL ABLATION W/ NOVASURE      EYE SURGERY      HYSTERECTOMY      @ age 57 (complete)    IR AV FISTULAGRAM/GRAFTOGRAM  10/11/2019    IR AV FISTULAGRAM/GRAFTOGRAM  2020    IR AV FISTULAGRAM/GRAFTOGRAM  2020    IR NON-TUNNELED CENTRAL LINE PLACEMENT  2021    IR NON-TUNNELED CENTRAL LINE PLACEMENT  10/4/2021    OOPHORECTOMY Bilateral     @ age 57    PERICARDIUM SURGERY      IN AMPUTATION FOOT,TRANSMETATARSAL Right 2021    Procedure: AMPUTATION TRANSMETATARSAL (TMA);  Surgeon: Alejandro Espinosa DPM;  [facility];  Service: Podiatry    IN AMPUTATION TOE,MT-P JT Right 2020    Procedure: AMPUTATION TOE- 5th;  Surgeon: Alejandro Espinosa DPM;  [facility];  Service: Podiatry    IN COLONOSCOPY FLX DX W/COLLJ SPEC WHEN PFRMD N/A 3/13/2019    Procedure: COLONOSCOPY;  Surgeon: Carissa Abdi MD;  [facility]: [facility];   Service: Gastroenterology    IN ESOPHAGOGASTRODUODENOSCOPY TRANSORAL DIAGNOSTIC N/A 3/13/2019    Procedure: ESOPHAGOGASTRODUODENOSCOPY (EGD); Surgeon: Theresa Kaiser MD;  [facility];   Service: Gastroenterology    ME LAPAROSCOPY W TOT HYSTERECT UTERUS 250 GRAM OR LESS N/A 2/16/2016    Procedure: HYSTERECTOMY LAPAROSCOPIC TOTAL ([address]), with bilateral salpingectomy;  Surgeon: Renetta Mcdonough DO;  [facility];  Service: Gynecology    THROMBECTOMY / ARTERIOVENOUS GRAFT REVISION      TOE SURGERY Right     removal of the 4th toe    WOUND DEBRIDEMENT Right 10/8/2021    Procedure: R 1st MTPJ washout ;  Surgeon: Sandra Zabala DPM;  [facility];  Service: Podiatry       Family History   Problem Relation Age of Onset    Heart disease Family     Diabetes Family     Heart disease Mother     Cancer Brother         neck    Throat cancer Brother     Ovarian cancer Maternal Aunt 36       Social History     Occupational History    Not on file   Tobacco Use    Smoking status: Never Smoker    Smokeless tobacco: Never Used   Vaping Use    Vaping Use: Never used   Substance and Sexual Activity    Alcohol use: Never     Alcohol/week: 0 0 standard drinks    Drug use: No    Sexual activity: Never     Partners: Male     Birth control/protection: Abstinence         Current Outpatient Medications:     gabapentin (NEURONTIN) 100 mg capsule, Take 100 mg by mouth 3 (three) times a day, Disp: , Rfl:     Accu-Chek Guide test strip, use to TEST BLOOD SUGAR two to three times a day, Disp: , Rfl:     Accu-Chek Softclix Lancets lancets, 3 (three) times a day Use to test blood sugar, Disp: , Rfl:     acetaminophen (TYLENOL) 325 mg tablet, Take 2 tablets (650 mg total) by mouth every 6 (six) hours as needed for mild pain or fever (Patient not taking: Reported on 10/27/2021), Disp: 30 tablet, Rfl: 0    albuterol (ProAir HFA) 90 mcg/act inhaler, Inhale 2 puffs every 6 (six) hours as needed for wheezing or shortness of breath, Disp: 8 5 g, Rfl: 0    ALPRAZolam (XANAX) 0 25 mg tablet, Take 0 25 mg by mouth 2 (two) times a day as needed for anxiety, Disp: , Rfl:     ammonium lactate (LAC-HYDRIN) 12 % cream, Apply topically 2 (two) times a day, Disp: 385 g, Rfl: 0    atorvastatin (LIPITOR) 20 mg tablet, Take 20 mg by mouth every evening , Disp: , Rfl: 0    B Complex-C-Folic Acid (Natalia-Denys) TABS, Take 1 tablet by mouth daily, Disp: , Rfl:     carvedilol (COREG) 25 mg tablet, Take 25 mg by mouth 2 (two) times a day with meals  , Disp: , Rfl:     cinacalcet (SENSIPAR) 30 mg tablet, Take 30 mg by mouth daily, Disp: , Rfl:     insulin glargine (Lantus SoloStar) 100 units/mL injection pen, Inject 15 Units under the skin daily at bedtime, Disp: 15 mL, Rfl: 0    KIONEX 15 GM/60ML suspension, Take by mouth Takes as a shake on dialysis days Tues-Thurs_Sat , Disp: , Rfl: 0    levothyroxine 50 mcg tablet, Take 50 mcg by mouth daily, Disp: , Rfl:     loratadine (CLARITIN) 10 mg tablet, Take 10 mg by mouth daily, Disp: , Rfl:     melatonin 3 mg, Take 1 tablet (3 mg total) by mouth daily at bedtime, Disp: 30 tablet, Rfl: 0    metoclopramide (REGLAN) 10 mg/10 mL oral solution, Take 5 mL (5 mg total) by mouth every 6 (six) hours as needed (nausea), Disp: 120 mL, Rfl: 1    NIFEdipine (ADALAT CC) 30 MG 24 hr tablet, Take 1 tablet (30 mg total) by mouth daily, Disp: 30 tablet, Rfl: 0    NOVOLOG FLEXPEN 100 units/mL SOPN, INJECT 1 TO 5 UNITS SUBCUTANEOUSLY THREE TIMES A DAY BEFORE MELAS AS PER SLIDING SCALE, Disp: , Rfl:     nystatin (MYCOSTATIN) powder, Apply topically 2 (two) times a day, Disp: 15 g, Rfl: 0    oxyCODONE (ROXICODONE) 5 immediate release tablet, Take 1 tablet (5 mg total) by mouth every 4 (four) hours as needed for severe pain Max Daily Amount: 30 mg (Patient not taking: Reported on 4/15/2022 ), Disp: 15 tablet, Rfl: 0    pantoprazole (PROTONIX) 40 mg tablet, take 1 tablet by mouth twice a day, Disp: , Rfl:     polyethylene glycol (MIRALAX) 17 g packet, Take 17 g by mouth daily for 7 days, Disp: 119 g, Rfl: 0    polyethylene glycol (MIRALAX) 17 g packet, Take 17 g by mouth daily, Disp: 510 g, Rfl: 5    senna (SENOKOT) 8 6 mg, Take 2 tablets (17 2 mg total) by mouth daily at bedtime as needed for constipation, Disp: 30 each, Rfl: 0    sertraline (ZOLOFT) 50 mg tablet, Take 1 tablet (50 mg total) by mouth daily (Patient taking differently: Take 75 mg by mouth daily  ), Disp: 30 tablet, Rfl: 0    sevelamer (RENAGEL) 800 mg tablet, Take 2 tablets (1,600 mg total) by mouth 3 (three) times a day with meals, Disp: 30 tablet, Rfl: 0    torsemide (DEMADEX) 100 mg tablet, Take 1 tablet (100 mg total) by mouth 4 (four) times a week On Sunday, Monday, Wednesday, Friday (Patient taking differently: Take 100 mg by mouth 4 (four) times a week On Sunday, Monday, Wednesday, Friday (pt states she takes it everyday) ), Disp: , Rfl: 0    urea (CARMOL) 40 %, APPLY TO AFFECTED AREA TOPICALLY EVERY DAY, Disp: 85 g, Rfl: 2    warfarin (COUMADIN) 3 mg tablet, Take 3 tablets (9 mg total) by mouth daily Takes 9 mg Monday Sat, Disp: 10 tablet, Rfl: 0    Allergies   Allergen Reactions    Iodinated Diagnostic Agents Itching    Keflex [Cephalexin] Hives    Wound Dressing Adhesive Rash       Physical Exam:    Ht 5' 6" (1 676 m)   LMP 02/12/2016 (Exact Date)   BMI 43 58 kg/m²     Constitutional:overweight  Eyes:anicteric  HEENT:grossly intact  Neck:supple, symmetric, trachea midline and no masses   Pulmonary:even and unlabored  Cardiovascular:No edema or pitting edema present  Skin:Normal without rashes or lesions and well hydrated  Psychiatric:Mood and affect appropriate  Neurologic:Cranial Nerves II-XII grossly intact  Musculoskeletal:antalgic gait, ambulates with a cane      Imaging  No orders to display   IMPRESSION:     Multilevel degenerative changes worse at L4-5 where there is fluid in the facet   joints consistent with effusions  There is a posterior disc herniation   There is   mild central canal stenosis  Abnormal appearance to kidneys  Workstation:[id number]   Narrative  History: Back pain  MRI of the lumbar spine was performed on a 1 5 Nikkie magnet  The following   sequences were obtained: sagittal STIR, T1-weighted and T2-weighted sequences as   well as axial T2-weighted sequences were performed  There are no prior studies for comparison  Findings:   Examination is somewhat limited by body habitus   Please note for the purposes of this dictation it is assumed that the patient   has 5 lumbar-type vertebral bodies  There is normal alignment of the lumbar spine  There is a abnormal appearance to the kidneys which demonstrate multiple cysts   and loss of cortex consistent with chronic kidney disease  Please correlate   clinically  Please correlate clinically  At L3-4 there are very mild degenerative change facets  At L4-5 there is fluid signal in the facet joints consistent with effusions is   some ligamentum flavum thickening degenerative change facets  There is a central   disc extrusion which extends up behind the inferior endplate of L4  There is   mild central canal stenosis  At L5-S1 there is no canal or neural foraminal stenosis  Other Result Text  Interface, Rad Results In - 02/09/2021 9:13 AM EST   Formatting of this note might be different from the original    History: Back pain  MRI of the lumbar spine was performed on a 1 5 Nikkie magnet  The following   sequences were obtained: sagittal STIR, T1-weighted and T2-weighted sequences as   well as axial T2-weighted sequences were performed  There are no prior studies for comparison  Findings:   Examination is somewhat limited by body habitus   Please note for the purposes of this dictation it is assumed that the patient   has 5 lumbar-type vertebral bodies  There is normal alignment of the lumbar spine       There is a abnormal appearance to the kidneys which demonstrate multiple cysts and loss of cortex consistent with chronic kidney disease  Please correlate   clinically  Please correlate clinically  At L3-4 there are very mild degenerative change facets  At L4-5 there is fluid signal in the facet joints consistent with effusions is   some ligamentum flavum thickening degenerative change facets  There is a central   disc extrusion which extends up behind the inferior endplate of L4  There is   mild central canal stenosis  At L5-S1 there is no canal or neural foraminal stenosis  IMPRESSION:   IMPRESSION:     Multilevel degenerative changes worse at L4-5 where there is fluid in the facet   joints consistent with effusions  There is a posterior disc herniation  There is   mild central canal stenosis  Abnormal appearance to kidneys  No orders of the defined types were placed in this encounter

## 2022-04-18 ENCOUNTER — TELEPHONE (OUTPATIENT)
Dept: RADIOLOGY | Facility: CLINIC | Age: 57
End: 2022-04-18

## 2022-04-18 DIAGNOSIS — Z01.812 ENCOUNTER FOR PREPROCEDURAL LABORATORY EXAMINATION: Primary | ICD-10-CM

## 2022-04-18 DIAGNOSIS — Z79.01 CHRONIC ANTICOAGULATION: ICD-10-CM

## 2022-04-18 NOTE — TELEPHONE ENCOUNTER
FAXED COUMADIN HOLD REQUEST TO DR Sandi Montiel @ [phone] FOR PATIENT TO BE SCHEDULED FOR L4-5 LESI

## 2022-04-22 NOTE — TELEPHONE ENCOUNTER
Scheduled pt for 5/4/22 at 1410 arrival  LD Coumadin on 5/28  Aware to get Pt/INR morning of procedure  Pt states she will go at 0900 that morning and has a standing order    Advised  needed  Nothing to eat or drink one hour prior  Loose fitting comfortable pants without and belts/zippers    Denies any vaccinations and reminded not to get any 2 weeks prior or 2 weeks after  Advised to call and cancel if sick or put on ABX for any infection  CB from now until procedure with any questions     [facility], do you want to place a specific order for PT/INR stat? Pt was asking if she could have a different pain medication  Advised that she just started Lyrica and she should give it more time    Pt verbalized understanding

## 2022-04-25 ENCOUNTER — TELEPHONE (OUTPATIENT)
Dept: PAIN MEDICINE | Facility: CLINIC | Age: 57
End: 2022-04-25

## 2022-04-25 NOTE — TELEPHONE ENCOUNTER
S/w pt, advised of Keesha's notation  Pt states she can hardly walk and the medication is not helping at all  Pt asked if she can increase lyrica prior to appointment for injection next week  RN advised request would be sent to [address] [place] [address] for review  RN once again advised if pain intolerable to go to ER, pt understood  Please advise, thank you

## 2022-04-25 NOTE — TELEPHONE ENCOUNTER
José Murry from [facility] office is requesting to speak with LOIS Salazar regarding who she recommends patient obtain a medical marijuana card from   Please advise, thx    Call back# [phone]

## 2022-04-25 NOTE — TELEPHONE ENCOUNTER
S/w Cheli at pt's PCP office  States pt had contacted their office reporting extreme pain causing inability to get out of bed  RN advised of K[facility]'s notation and offered to contact pt for update  Cheli verbalized understanding  S/w pt, states she has been taking lyrica BID without relief, reports drowsiness with lyrica, denies any other s/e  Pt states she is taking extra strength tylenol without any pain relief  Pt requested medical marijuana provider list be mailed to home address, RN confirmed address on file  List placed in outgoing mail  RN advised pt update would be sent to [facility] for review and recommendation  RN advised if pain becomes too severe to be evaluated in ER  Pt understood

## 2022-05-04 ENCOUNTER — HOSPITAL ENCOUNTER (OUTPATIENT)
Dept: RADIOLOGY | Facility: CLINIC | Age: 57
Discharge: HOME | End: 2022-05-04
Attending: ANESTHESIOLOGY

## 2022-05-04 VITALS
SYSTOLIC BLOOD PRESSURE: 123 MMHG | DIASTOLIC BLOOD PRESSURE: 58 MMHG | RESPIRATION RATE: 20 BRPM | HEART RATE: 79 BPM | TEMPERATURE: 97.8 F | OXYGEN SATURATION: 100 %

## 2022-05-04 DIAGNOSIS — M54.16 LUMBAR RADICULOPATHY: ICD-10-CM

## 2022-05-04 RX ORDER — METHYLPREDNISOLONE ACETATE 80 MG/ML
80 INJECTION, SUSPENSION INTRA-ARTICULAR; INTRALESIONAL; INTRAMUSCULAR; PARENTERAL; SOFT TISSUE ONCE
Status: DISCONTINUED | OUTPATIENT
Start: 2022-05-04 | End: 2022-05-04

## 2022-05-04 RX ORDER — LIDOCAINE HYDROCHLORIDE 10 MG/ML
5 INJECTION, SOLUTION EPIDURAL; INFILTRATION; INTRACAUDAL; PERINEURAL ONCE
Status: DISCONTINUED | OUTPATIENT
Start: 2022-05-04 | End: 2022-05-04

## 2022-05-11 ENCOUNTER — OFFICE VISIT (OUTPATIENT)
Dept: PODIATRY | Facility: CLINIC | Age: 57
End: 2022-05-11
Payer: MEDICARE

## 2022-05-11 VITALS — BODY MASS INDEX: 45.68 KG/M2 | WEIGHT: 283 LBS

## 2022-05-11 DIAGNOSIS — Z89.431 HISTORY OF TRANSMETATARSAL AMPUTATION OF RIGHT FOOT (HCC): Primary | ICD-10-CM

## 2022-05-11 DIAGNOSIS — E11.42 DIABETIC POLYNEUROPATHY ASSOCIATED WITH TYPE 2 DIABETES MELLITUS (HCC): ICD-10-CM

## 2022-05-11 PROCEDURE — 99213 OFFICE O/P EST LOW 20 MIN: CPT | Performed by: PODIATRIST

## 2022-05-17 DIAGNOSIS — Z99.2 ESRD ON DIALYSIS (HCC): Primary | ICD-10-CM

## 2022-05-17 DIAGNOSIS — N18.6 ESRD ON DIALYSIS (HCC): Primary | ICD-10-CM

## 2022-05-17 RX ORDER — FOLIC ACID/VIT B COMPLEX AND C 0.8 MG
TABLET ORAL
Qty: 90 TABLET | Refills: 3 | Status: SHIPPED | OUTPATIENT
Start: 2022-05-17

## 2022-05-18 ENCOUNTER — APPOINTMENT (OUTPATIENT)
Dept: LAB | Facility: HOSPITAL | Age: 57
End: 2022-05-18
Payer: MEDICARE

## 2022-05-18 ENCOUNTER — HOSPITAL ENCOUNTER (OUTPATIENT)
Dept: RADIOLOGY | Facility: CLINIC | Age: 57
Discharge: HOME | End: 2022-05-18
Attending: ANESTHESIOLOGY | Admitting: ANESTHESIOLOGY
Payer: MEDICARE

## 2022-05-18 VITALS
SYSTOLIC BLOOD PRESSURE: 136 MMHG | RESPIRATION RATE: 18 BRPM | OXYGEN SATURATION: 97 % | HEART RATE: 69 BPM | DIASTOLIC BLOOD PRESSURE: 69 MMHG | TEMPERATURE: 97.2 F

## 2022-05-18 DIAGNOSIS — Z79.01 CHRONIC ANTICOAGULATION: ICD-10-CM

## 2022-05-18 LAB
INR PPP: 1.08 (ref 0.84–1.19)
PROTHROMBIN TIME: 13.6 SECONDS (ref 11.6–14.5)

## 2022-05-18 PROCEDURE — 36415 COLL VENOUS BLD VENIPUNCTURE: CPT

## 2022-05-18 PROCEDURE — 85610 PROTHROMBIN TIME: CPT

## 2022-05-18 PROCEDURE — 62323 NJX INTERLAMINAR LMBR/SAC: CPT | Performed by: ANESTHESIOLOGY

## 2022-05-18 RX ORDER — PAPAVERINE HCL 150 MG
10 CAPSULE, EXTENDED RELEASE ORAL ONCE
Status: COMPLETED | OUTPATIENT
Start: 2022-05-18 | End: 2022-05-18

## 2022-05-18 RX ADMIN — DEXAMETHASONE SODIUM PHOSPHATE 10 MG: 10 INJECTION, SOLUTION INTRAMUSCULAR; INTRAVENOUS at 09:26

## 2022-05-18 NOTE — H&P
History of Present Illness: The patient is a 56 y o  female who presents with complaints of low back and leg pain      Patient Active Problem List   Diagnosis    Essential hypertension    History of venous thromboembolism    Abnormal uterine bleeding    Glaucoma    ESRD on hemodialysis    Anxiety disorder    Knee pain    Ambulatory dysfunction    [facility]od[facility] status ([facility]    Primary osteoarthritis of right knee    Esophageal reflux    Colon cancer screening    Gastroesophageal reflux disease    Meningioma (HCC)    Nausea and vomiting    Secondary hyperparathyroidism of renal origin ([address]    Dyspnea    Anemia of chronic disease    Disruption of internal surgical wound    Hyponatremia    Parenchymal renal hypertension    Candidiasis of breast    Hemodialysis-associated hypotension    Lumbar radiculopathy    Low back pain with sciatica    Flu-like symptoms    Diabetic polyneuropathy associated with type 2 diabetes mellitus ([address]    Tinea pedis    Encounter for diabetic foot exam ([facility]    Corns and callosities    History of amputation of lesser toe of right foot ([facility]    Morbid obesity    Hyperlipidemia    History of claustrophobia    Allergic rhinitis    Benign neoplasm of skin    Cardiomyopathy ([facility]    Cervical dysplasia    Chronic endometritis    Complication from renal dialysis device    Concussion without loss of consciousness    Insulin-dependent diabetes mellitus with neuropathy    Acquired hypothyroidism    Legal blindness, as defined in [place] [facility]    Limb pain    Mononeuritis of upper limb, unspecified laterality    Phlebitis and thrombophlebitis of superficial vessels of lower extremities    Pulmonary embolus ([address]    S/P foot surgery, right    Tinea unguium    Bilateral primary osteoarthritis of knee    Bilateral patellofemoral syndrome    Chronic pain syndrome    A-V fistula ([facility]    Right foot pain    Arteriovenous fistula for hemodialysis in place, primary [facility])    Healthcare-associated pneumonia    Pneumonia    Chronic anticoagulation    Essential hypertension    Sigmoid diverticulitis    Postop check    Pruritus    Constipation    Right knee pain       Past Medical History:   Diagnosis Date    Abnormal uterine bleeding (AUB)     Anemia     Anxiety     Arthritis     Chronic kidney disease     Claustrophobia     Diabetes mellitus ([facility]     Disease of thyroid gland     DVT (deep venous thrombosis) (HCC)     End stage kidney disease (HCC)     Foot ulcer due to secondary DM ([facility]     Hemodialysis patient ([facility] [facility] )     Tues, Thurs, Sat    Hypertension     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[facility] [facility]     right eye    Morbid obesity ([facility] [facility]     Osteomyelitis of fifth toe of right foot ([facility] [facility]     Panic attacks     Pulmonary embolism (HCC)     Reflux esophagitis     Thrombophlebitis of saphenous vein     Warfarin anticoagulation        Past Surgical History:   Procedure Laterality Date    AMPUTATION      r 4th toe    ARTERIOVENOUS GRAFT PLACEMENT      CATARACT EXTRACTION Bilateral     CERVICAL BIOPSY  W/ LOOP ELECTRODE EXCISION       SECTION      DIALYSIS FISTULA CREATION      DILATION AND CURETTAGE OF UTERUS      ENDOMETRIAL ABLATION W/ NOVASURE      EYE SURGERY      HYSTERECTOMY      @ age 57 (complete)    IR AV FISTULAGRAM/GRAFTOGRAM  10/11/2019    IR AV FISTULAGRAM/GRAFTOGRAM  2020    IR AV FISTULAGRAM/GRAFTOGRAM  2020    IR NON-TUNNELED CENTRAL LINE PLACEMENT  2021    IR NON-TUNNELED CENTRAL LINE PLACEMENT  10/4/2021    OOPHORECTOMY Bilateral     @ age 57    PERICARDIUM SURGERY      AK AMPUTATION FOOT,TRANSMETATARSAL Right 2021    Procedure: AMPUTATION TRANSMETATARSAL (TMA);  Surgeon: Aparna Angela DPM;  [facility];  Service: Podiatry    AK AMPUTATION TOE,MT-P JT Right 2020    Procedure: AMPUTATION TOE- 5th;  Surgeon: Aparna Angela DPM;  [facility];  Service: Podiatry    SD COLONOSCOPY FLX DX W/COLLJ SPEC WHEN PFRMD N/A 3/13/2019    Procedure: COLONOSCOPY;  Surgeon: Moisés Gaines MD;  Location: [facility]; Service: Gastroenterology    SD ESOPHAGOGASTRODUODENOSCOPY TRANSORAL DIAGNOSTIC N/A 3/13/2019    Procedure: ESOPHAGOGASTRODUODENOSCOPY (EGD); Surgeon: Moisés Gaines MD;  [facility];   Service: Gastroenterology    SD LAPAROSCOPY W TOT HYSTERECT UTERUS [address] OR LESS N/A 2/16/2016    Procedure: HYSTERECTOMY LAPAROSCOPIC TOTAL ([address]), with bilateral salpingectomy;  Surgeon: Chago Mckeon DO;  [facility];  Service: Gynecology    THROMBECTOMY / ARTERIOVENOUS GRAFT REVISION      TOE SURGERY Right     removal of the 4th toe    WOUND DEBRIDEMENT Right 10/8/2021    Procedure: R 1st MTPJ washout ;  Surgeon: Joshua Wang DPM;  [facility];  Service: Podiatry         Current Outpatient Medications:     Accu-Chek Guide test strip, use to TEST BLOOD SUGAR two to three times a day, Disp: , Rfl:     Accu-Chek Softclix Lancets lancets, 3 (three) times a day Use to test blood sugar, Disp: , Rfl:     acetaminophen (TYLENOL) 325 mg tablet, Take 2 tablets (650 mg total) by mouth every 6 (six) hours as needed for mild pain or fever (Patient not taking: No sig reported), Disp: 30 tablet, Rfl: 0    albuterol (ProAir HFA) 90 mcg/act inhaler, Inhale 2 puffs every 6 (six) hours as needed for wheezing or shortness of breath, Disp: 8 5 g, Rfl: 0    ALPRAZolam (XANAX) 0 25 mg tablet, Take 0 25 mg by mouth 2 (two) times a day as needed for anxiety, Disp: , Rfl:     ALPRAZolam (XANAX) 0 5 mg tablet, TAKE 2 AND 1/2 TABLETS DAILY IN DIVIDED DOSES AS DIRECTED , Disp: , Rfl:     ammonium lactate (LAC-HYDRIN) 12 % cream, Apply topically 2 (two) times a day, Disp: 385 g, Rfl: 0    atorvastatin (LIPITOR) 20 mg tablet, Take 20 mg by mouth every evening , Disp: , Rfl: 0    B Complex-C-Folic Acid (Natalia-Denys) TABS, TAKE ONE TABLET BY MOUTH DAILY, Disp: 90 tablet, Rfl: 3    carvedilol (COREG) 25 mg tablet, Take 25 mg by mouth 2 (two) times a day with meals  , Disp: , Rfl:     cinacalcet (SENSIPAR) 30 mg tablet, Take 30 mg by mouth daily, Disp: , Rfl:     dicyclomine (BENTYL) 10 mg capsule, TAKE 1 TABLET BY MOUTH EVERY 6 HOURS OR AS NEEDED FOR PAIN, Disp: , Rfl:     insulin glargine (Lantus SoloStar) 100 units/mL injection pen, Inject 15 Units under the skin daily at bedtime, Disp: 15 mL, Rfl: 0    KIONEX 15 GM/60ML suspension, Take by mouth Takes as a shake on dialysis days Tues-Thurs_Sat , Disp: , Rfl: 0    levothyroxine 50 mcg tablet, Take 50 mcg by mouth daily, Disp: , Rfl:     lidocaine (LIDODERM) 5 %, , Disp: , Rfl:     loratadine (CLARITIN) 10 mg tablet, Take 10 mg by mouth daily, Disp: , Rfl:     melatonin 3 mg, Take 1 tablet (3 mg total) by mouth daily at bedtime, Disp: 30 tablet, Rfl: 0    metoclopramide (REGLAN) 10 mg/10 mL oral solution, Take 5 mL (5 mg total) by mouth every 6 (six) hours as needed (nausea), Disp: 120 mL, Rfl: 1    NIFEdipine (ADALAT CC) 30 MG 24 hr tablet, Take 1 tablet (30 mg total) by mouth daily, Disp: 30 tablet, Rfl: 0    NOVOLOG FLEXPEN 100 units/mL SOPN, INJECT 1 TO 5 UNITS SUBCUTANEOUSLY THREE TIMES A DAY BEFORE MELAS AS PER SLIDING SCALE, Disp: , Rfl:     nystatin (MYCOSTATIN) powder, Apply topically 2 (two) times a day, Disp: 15 g, Rfl: 0    ondansetron (ZOFRAN-ODT) 8 mg disintegrating tablet, , Disp: , Rfl:     oxyCODONE (ROXICODONE) 5 immediate release tablet, Take 1 tablet (5 mg total) by mouth every 4 (four) hours as needed for severe pain Max Daily Amount: 30 mg (Patient not taking: No sig reported), Disp: 15 tablet, Rfl: 0    pantoprazole (PROTONIX) 40 mg tablet, take 1 tablet by mouth twice a day, Disp: , Rfl:     polyethylene glycol (MIRALAX) 17 g packet, Take 17 g by mouth daily for 7 days, Disp: 119 g, Rfl: 0    polyethylene glycol (MIRALAX) 17 g packet, Take 17 g by mouth daily, Disp: 510 g, Rfl: 5    pregabalin (LYRICA) 50 mg capsule, Take 1 PO daily, take 1 PO additionally directly have HD on HD days, Disp: 45 capsule, Rfl: 1    senna (SENOKOT) 8 6 mg, Take 2 tablets (17 2 mg total) by mouth daily at bedtime as needed for constipation, Disp: 30 each, Rfl: 0    sertraline (ZOLOFT) 50 mg tablet, Take 1 tablet (50 mg total) by mouth daily (Patient taking differently: Take 75 mg by mouth in the morning ), Disp: 30 tablet, Rfl: 0    sevelamer (RENAGEL) 800 mg tablet, Take 2 tablets (1,600 mg total) by mouth 3 (three) times a day with meals, Disp: 30 tablet, Rfl: 0    torsemide (DEMADEX) 100 mg tablet, Take 1 tablet (100 mg total) by mouth 4 (four) times a week On Sunday, Monday, Wednesday, Friday (Patient taking differently: Take 100 mg by mouth 4 (four) times a week On Sunday, Monday, Wednesday, Friday (pt states she takes it everyday)), Disp: , Rfl: 0    urea (CARMOL) 40 %, APPLY TO AFFECTED AREA TOPICALLY EVERY DAY, Disp: 85 g, Rfl: 2    warfarin (COUMADIN) 3 mg tablet, Take 3 tablets (9 mg total) by mouth daily Takes 9 mg Monday Sat, Disp: 10 tablet, Rfl: 0    zolpidem (AMBIEN) 5 mg tablet, Take 5 mg by mouth daily at bedtime, Disp: , Rfl:     Allergies   Allergen Reactions    Iodinated Diagnostic Agents Itching    Keflex [Cephalexin] Hives    Wound Dressing Adhesive Rash       Physical Exam:   Vitals:    05/18/22 0846   BP: 150/84   Pulse: 71   Resp: 18   Temp: (!) 97 2 °F (36 2 °C)   SpO2: 94%     General: Awake, Alert, Oriented x 3, Mood and affect appropriate  Respiratory: Respirations even and unlabored  Cardiovascular: Peripheral pulses intact; no edema  Musculoskeletal Exam:  Bilateral lumbar paraspinals tender to palpation    ASA Score: 3    Patient/Chart Verification  Patient ID Verified: Verbal  ID Band Applied: No  Consents Confirmed: Procedural  H&P( within 30 days) Verified:  To be obtained in the Pre-Procedure area  Interval H&P(within 24 hr) Complete (required for Outpatients and Surgery Admit only): To be obtained in the Pre-Procedure area  Allergies Reviewed:  Yes  Anticoag/NSAID held?: Yes (stopped coumadin on 5/13/22)  Currently on antibiotics?: No  Pre-op Lab/Test Results Available: N/A  Pregnancy Lab Collected: N/A comment    Assessment:  Lumbar radiculopathy    Plan: L4-5 SHIREEN

## 2022-05-18 NOTE — DISCHARGE INSTRUCTIONS
Epidural Steroid Injection   WHAT YOU NEED TO KNOW:   An epidural steroid injection (MEAGAN) is a procedure to inject steroid medicine into the epidural space  The epidural space is between your spinal cord and vertebrae  Steroids reduce inflammation and fluid buildup in your spine that may be causing pain  You may be given pain medicine along with the steroids  ACTIVITY  Do not drive or operate machinery today  No strenuous activity today - bending, lifting, etc   You may resume normal activites starting tomorrow - start slowly and as tolerated  You may shower today, but no tub baths or hot tubs  You may have numbness for several hours from the local anesthetic  Please use caution and common sense, especially with weight-bearing activities  CARE OF THE INJECTION SITE  If you have soreness or pain, apply ice to the area today (20 minutes on/20 minutes off)  Starting tomorrow, you may use warm, moist heat or ice if needed  You may have an increase or change in your discomfort for 36-48 hours after your treatment  Apply ice and continue with any pain medication you have been prescribed  Notify [facility] if you have any of the following: redness, drainage, swelling, headache, stiff neck or fever above 100°F     SPECIAL INSTRUCTIONS  Our office will contact you in approximately 7 days for a progress report  MEDICATIONS  Continue to take all routine medications  Our office may have instructed you to hold some medications  As no general anesthesia was used in today's procedure, you should not experience any side effects related to anesthesia  If you have a problem specifically related to your procedure, please call our office at [phone]  Problems not related to your procedure should be directed to your primary care physician

## 2022-05-25 ENCOUNTER — TELEPHONE (OUTPATIENT)
Dept: PAIN MEDICINE | Facility: CLINIC | Age: 57
End: 2022-05-25

## 2022-06-03 ENCOUNTER — ANCILLARY ORDERS (OUTPATIENT)
Dept: LAB | Facility: CLINIC | Age: 57
End: 2022-06-03

## 2022-06-03 DIAGNOSIS — M51.26 LUMBAR HERNIATED DISC: Primary | ICD-10-CM

## 2022-06-03 DIAGNOSIS — Z79.01 LONG TERM (CURRENT) USE OF ANTICOAGULANTS: ICD-10-CM

## 2022-06-03 DIAGNOSIS — M54.41 CHRONIC RIGHT-SIDED LOW BACK PAIN WITH RIGHT-SIDED SCIATICA: ICD-10-CM

## 2022-06-03 DIAGNOSIS — G89.29 CHRONIC RIGHT-SIDED LOW BACK PAIN WITH RIGHT-SIDED SCIATICA: ICD-10-CM

## 2022-06-10 ENCOUNTER — NURSE TRIAGE (OUTPATIENT)
Dept: PHYSICAL THERAPY | Facility: OTHER | Age: 57
End: 2022-06-10

## 2022-06-10 DIAGNOSIS — M54.50 ACUTE EXACERBATION OF CHRONIC LOW BACK PAIN: Primary | ICD-10-CM

## 2022-06-10 DIAGNOSIS — G89.29 ACUTE EXACERBATION OF CHRONIC LOW BACK PAIN: Primary | ICD-10-CM

## 2022-06-10 NOTE — TELEPHONE ENCOUNTER
Additional Information   Negative: Is this related to a work injury?  Negative: Is this related to an MVA?  Negative: Are you currently recieving homecare services? Background - Initial Assessment  Clinical complaint: Pain is mid and low back R>L, radiates down R>L legs stopping at feet  Had right toe amputation  Has numbness and tingling on right side  Started 3-4 years, but recently got worse  Pain not less than 8/10   Was seen by PCP, PM, and Dialysis Sylwia Gay  Date of onset: 3-4 yrs  Frequency of pain: constant  Quality of pain: aching and sharp    Protocols used: [facility] COMPREHENSIVE SPINE PROGRAM PROTOCOL

## 2022-06-10 NOTE — TELEPHONE ENCOUNTER
Additional Information   Negative: Has the patient had unexplained weight loss?  Negative: Does the patient have a fever?  Negative: Is the patient experiencing urine retention? On Dialysis   Negative: Is the patient experiencing acute drop foot or paralysis?  Negative: Has the patient experienced major trauma? (fall from height, high speed collision, direct blow to spine) and is also experiencing nausea, light-headedness, or loss of consciousness?  Negative: Is the patient experiencing blood in sputum?  Affirmative: Is this a chronic condition? Protocols used: CHARISSE KHAN COMPREHENSIVE SPINE PROGRAM PROTOCOL    Nurse completed triage and explained referral for PT evaluation with the pt  Patient referred to CS yesterday  As stated below pt is established with  [facility]    Patient did state she has noticed worsening of low back pain over the past few weeks w/o known injury  Referral entered for the [facility] and contact info given to her as well  Pt made aware per protocol, a message would be sent to [address] [place] [address] services/counselor Job Masters ) based on triage intake assessment questions  The goal is to take care of patients emotional well-being in addition to the physical well-being  We recognize that you are at an increased risk for anxiety and depression with the worrying thoughts you've expressed  [facility] will [facility] you to discuss options and services  Patient was pleasant and appropriate during this encounter, but agreed it would be beneficial to discuss her pain issues  Patient appreciative of CB and triage  Nurse wished her well and referral closed

## 2022-06-11 ENCOUNTER — HOSPITAL ENCOUNTER (EMERGENCY)
Facility: HOSPITAL | Age: 57
Discharge: HOME | End: 2022-06-11
Attending: EMERGENCY MEDICINE | Admitting: EMERGENCY MEDICINE
Payer: MEDICARE

## 2022-06-11 VITALS
HEART RATE: 84 BPM | SYSTOLIC BLOOD PRESSURE: 160 MMHG | DIASTOLIC BLOOD PRESSURE: 73 MMHG | RESPIRATION RATE: 18 BRPM | TEMPERATURE: 97.4 F | OXYGEN SATURATION: 94 %

## 2022-06-11 DIAGNOSIS — M54.50 LOW BACK PAIN: Primary | ICD-10-CM

## 2022-06-11 PROCEDURE — 99283 EMERGENCY DEPT VISIT LOW MDM: CPT

## 2022-06-11 PROCEDURE — 96372 THER/PROPH/DIAG INJ SC/IM: CPT

## 2022-06-11 PROCEDURE — 99284 EMERGENCY DEPT VISIT MOD MDM: CPT | Performed by: EMERGENCY MEDICINE

## 2022-06-11 RX ORDER — METHOCARBAMOL 500 MG/1
1000 TABLET, FILM COATED ORAL ONCE
Status: COMPLETED | OUTPATIENT
Start: 2022-06-11 | End: 2022-06-11

## 2022-06-11 RX ORDER — KETOROLAC TROMETHAMINE 30 MG/ML
15 INJECTION, SOLUTION INTRAMUSCULAR; INTRAVENOUS ONCE
Status: COMPLETED | OUTPATIENT
Start: 2022-06-11 | End: 2022-06-11

## 2022-06-11 RX ORDER — LIDOCAINE 50 MG/G
1 PATCH TOPICAL ONCE
Status: DISCONTINUED | OUTPATIENT
Start: 2022-06-11 | End: 2022-06-11 | Stop reason: HOSPADM

## 2022-06-11 RX ORDER — ACETAMINOPHEN 325 MG/1
975 TABLET ORAL ONCE
Status: COMPLETED | OUTPATIENT
Start: 2022-06-11 | End: 2022-06-11

## 2022-06-11 RX ORDER — CYCLOBENZAPRINE HCL 5 MG
5 TABLET ORAL ONCE
Status: COMPLETED | OUTPATIENT
Start: 2022-06-11 | End: 2022-06-11

## 2022-06-11 RX ORDER — METHOCARBAMOL 500 MG/1
500 TABLET, FILM COATED ORAL 2 TIMES DAILY
Qty: 20 TABLET | Refills: 0 | Status: SHIPPED | OUTPATIENT
Start: 2022-06-11 | End: 2022-07-29

## 2022-06-11 RX ADMIN — KETOROLAC TROMETHAMINE 15 MG: 30 INJECTION, SOLUTION INTRAMUSCULAR; INTRAVENOUS at 05:36

## 2022-06-11 RX ADMIN — LIDOCAINE 5% 1 PATCH: 700 PATCH TOPICAL at 05:34

## 2022-06-11 RX ADMIN — METHOCARBAMOL TABLETS 1000 MG: 500 TABLET, COATED ORAL at 05:34

## 2022-06-11 RX ADMIN — CYCLOBENZAPRINE HYDROCHLORIDE 5 MG: 5 TABLET, FILM COATED ORAL at 08:47

## 2022-06-11 RX ADMIN — LIDOCAINE 5% 1 PATCH: 700 PATCH TOPICAL at 08:46

## 2022-06-11 RX ADMIN — ACETAMINOPHEN 975 MG: 325 TABLET ORAL at 05:34

## 2022-06-11 NOTE — ED ATTENDING ATTESTATION
6/11/2022  IShorty MD, saw and evaluated the patient  I have discussed the patient with the resident/non-physician practitioner and agree with the resident's/non-physician practitioner's findings, Plan of Care, and MDM as documented in the resident's/non-physician practitioner's note, except where noted  All available labs and Radiology studies were reviewed  I was present for key portions of any procedure(s) performed by the resident/non-physician practitioner and I was immediately available to provide assistance  At this point I agree with the current assessment done in the Emergency Department  I have conducted an independent evaluation of this patient a history and physical is as follows:  68-year-old female on dialysis here with chronic back pain  Patient has been having back pain on a chronic basis, followed by comprehensive spine with follow-up in July  Patient has received epidural injections, but states her pain is uncontrolled  Complains of severe right-sided pain that radiates down her right leg  No nausea vomiting  No abdominal pain  Patient states that this is in keeping with prior painful episodes  Patient has not tried anything for it  No fevers, no IV drug use, review of systems otherwise -12 systems reviewed  On exam patient with reproducible pain on palpation of right low back, neurovascularly intact, no saddle anesthesia  Impression:  Acute on chronic back pain    Will treat symptomatically  ED Course         Critical Care Time  Procedures

## 2022-06-11 NOTE — ED PROVIDER NOTES
History  Chief Complaint   Patient presents with    Back Pain     Pt c/o back pain in right, pt has Hx of back pain and has had injections      65-year-old female with a past medical history of chronic back pain and follows up with [facility] and received an epidural injection 3 weeks ago, end-stage renal disease on dialysis (next dialysis treatment today), who presents for back pain  Patient states that this med pain feels similar to her prior chronic back pain, although it is been worse the past few days  She states that it is worse on the right low back, which is commonly where it is worse  She does have some pain in the left low back  She describes that a radiates down both legs all the way to the feet, although again is worse on the right side  Does not describe any  fevers, no IV drug use, no bilateral lower extremity weakness, urinary or fecal incontinence, no saddle anesthesia, no regular steroid use, no trauma  ROS otherwise negative  Prior to Admission Medications   Prescriptions Last Dose Informant Patient Reported? Taking? ALPRAZolam (XANAX) 0 25 mg tablet  Self Yes No   Sig: Take 0 25 mg by mouth 2 (two) times a day as needed for anxiety   ALPRAZolam (XANAX) 0 5 mg tablet   Yes No   Sig: TAKE 2 AND 1/2 TABLETS DAILY IN DIVIDED DOSES AS DIRECTED     Accu-Chek Guide test strip   Yes No   Sig: use to TEST BLOOD SUGAR two to three times a day   Accu-Chek Softclix Lancets lancets  Self Yes No   Sig: 3 (three) times a day Use to test blood sugar   B Complex-C-Folic Acid (Natalia-Denys) TABS   No No   Sig: TAKE ONE TABLET BY MOUTH DAILY   KIONEX 15 GM/60ML suspension  Self Yes No   Sig: Take by mouth Takes as a shake on dialysis days Tues-Thurs_Sat    NIFEdipine (ADALAT CC) 30 MG 24 hr tablet   No No   Sig: Take 1 tablet (30 mg total) by mouth daily   NOVOLOG FLEXPEN 100 units/mL SOPN  Self Yes No   Sig: INJECT 1 TO 5 UNITS SUBCUTANEOUSLY THREE TIMES A DAY BEFORE MELAS AS PER SLIDING SCALE acetaminophen (TYLENOL) 325 mg tablet  Self No No   Sig: Take 2 tablets (650 mg total) by mouth every 6 (six) hours as needed for mild pain or fever   Patient not taking: No sig reported   albuterol (ProAir HFA) 90 mcg/act inhaler   No No   Sig: Inhale 2 puffs every 6 (six) hours as needed for wheezing or shortness of breath   ammonium lactate (LAC-HYDRIN) 12 % cream   No No   Sig: Apply topically 2 (two) times a day   atorvastatin (LIPITOR) 20 mg tablet  Self Yes No   Sig: Take 20 mg by mouth every evening    carvedilol (COREG) 25 mg tablet  Self Yes No   Sig: Take 25 mg by mouth 2 (two) times a day with meals     cinacalcet (SENSIPAR) 30 mg tablet  Self Yes No   Sig: Take 30 mg by mouth daily   dicyclomine (BENTYL) 10 mg capsule   Yes No   Sig: TAKE 1 TABLET BY MOUTH EVERY 6 HOURS OR AS NEEDED FOR PAIN   insulin glargine (Lantus SoloStar) 100 units/mL injection pen   No No   Sig: Inject 15 Units under the skin daily at bedtime   levothyroxine 50 mcg tablet  Self Yes No   Sig: Take 50 mcg by mouth daily   lidocaine (LIDODERM) 5 %   Yes No   loratadine (CLARITIN) 10 mg tablet  Self Yes No   Sig: Take 10 mg by mouth daily   melatonin 3 mg   No No   Sig: Take 1 tablet (3 mg total) by mouth daily at bedtime   metoclopramide (REGLAN) 10 mg/10 mL oral solution   No No   Sig: Take 5 mL (5 mg total) by mouth every 6 (six) hours as needed (nausea)   nystatin (MYCOSTATIN) powder  Self No No   Sig: Apply topically 2 (two) times a day   ondansetron (ZOFRAN-ODT) 8 mg disintegrating tablet   Yes No   oxyCODONE (ROXICODONE) 5 immediate release tablet   No No   Sig: Take 1 tablet (5 mg total) by mouth every 4 (four) hours as needed for severe pain Max Daily Amount: 30 mg   Patient not taking: No sig reported   pantoprazole (PROTONIX) 40 mg tablet   Yes No   Sig: take 1 tablet by mouth twice a day   polyethylene glycol (MIRALAX) 17 g packet   No No   Sig: Take 17 g by mouth daily for 7 days   polyethylene glycol (MIRALAX) 17 g packet   No No   Sig: Take 17 g by mouth daily   pregabalin (LYRICA) 50 mg capsule   No No   Sig: Take 1 PO daily, take 1 PO additionally directly have HD on HD days   senna (SENOKOT) 8 6 mg  Self No No   Sig: Take 2 tablets (17 2 mg total) by mouth daily at bedtime as needed for constipation   sertraline (ZOLOFT) 50 mg tablet  Self No No   Sig: Take 1 tablet (50 mg total) by mouth daily   Patient taking differently: Take 75 mg by mouth in the morning     sevelamer (RENAGEL) 800 mg tablet   No No   Sig: Take 2 tablets (1,600 mg total) by mouth 3 (three) times a day with meals   torsemide (DEMADEX) 100 mg tablet   No No   Sig: Take 1 tablet (100 mg total) by mouth 4 (four) times a week On Sunday, Monday, Wednesday, Friday   Patient taking differently: Take 100 mg by mouth 4 (four) times a week On Sunday, Monday, Wednesday, Friday (pt states she takes it everyday)   urea (CARMOL) 40 %   No No   Sig: APPLY TO AFFECTED AREA TOPICALLY EVERY DAY   warfarin (COUMADIN) 3 mg tablet  Self No No   Sig: Take 3 tablets (9 mg total) by mouth daily Takes 9 mg Monday Sat   zolpidem (AMBIEN) 5 mg tablet   Yes No   Sig: Take 5 mg by mouth daily at bedtime      Facility-Administered Medications: None       Past Medical History:   Diagnosis Date    Abnormal uterine bleeding (AUB)     Anemia     Anxiety     Arthritis     Chronic kidney disease     Claustrophobia     [facility]     Disease of thyroid gland     [facility] thrombosis[facility]     End stage kidney disease ([facility] )     Foot ulcer due to secondary DM ([facility]     Hemodialysis patient ([facility] )     Tues, Thurs, Sat    Hypertension     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[facility]     right eye    Morbid obesity ([facility]     Osteomyelitis of fifth toe of right foot ([facility])     Panic attacks     Pulmonary embolism (HCC)     Reflux esophagitis     Thrombophlebitis of saphenous vein     Warfarin anticoagulation        Past Surgical History:   Procedure Laterality Date    AMPUTATION      r 4th toe    ARTERIOVENOUS GRAFT PLACEMENT      CATARACT EXTRACTION Bilateral     CERVICAL BIOPSY  W/ LOOP ELECTRODE EXCISION       SECTION      DIALYSIS FISTULA CREATION      DILATION AND CURETTAGE OF UTERUS      ENDOMETRIAL ABLATION W/ NOVASURE      EYE SURGERY      HYSTERECTOMY      @ age 57 (complete)    IR AV FISTULAGRAM/GRAFTOGRAM  10/11/2019    IR AV FISTULAGRAM/GRAFTOGRAM  2020    IR AV FISTULAGRAM/GRAFTOGRAM  2020    IR NON-TUNNELED CENTRAL LINE PLACEMENT  2021    IR NON-TUNNELED CENTRAL LINE PLACEMENT  10/4/2021    OOPHORECTOMY Bilateral     @ age 57    [facility] Right 2021    Procedure: AMPUTATION TRANSMETATARSAL (TMA);  Surgeon: Kirill Pinedo DPM;  [facility];  Service: Podiatry    WI AMPUTATION TOE,MT-P JT Right 2020    Procedure: AMPUTATION TOE- 5th;  Surgeon: Kirill Pinedo DPM;  [facility];  Service: Podiatry    WI COLONOSCOPY FLX DX W/COLLJ [id number] PFRMD N/A 3/13/2019    Procedure: COLONOSCOPY;  Surgeon: Wilma Martínez MD;  [facility]: [facility]; Service: Gastroenterology    WI ESOPHAGOGASTRODUODENOSCOPY TRANSORAL DIAGNOSTIC N/A 3/13/2019    Procedure: ESOPHAGOGASTRODUODENOSCOPY (EGD); Surgeon: Wilma Martínez MD;  [facility];   Service: Gastroenterology    WI LAPAROSCOPY W TOT HYSTERECT UTERUS 250 GRAM OR LESS N/A 2016    Procedure: HYSTERECTOMY LAPAROSCOPIC TOTAL [facility]), with bilateral salpingectomy;  Surgeon: Graciela Nolan DO;  [facility];  Service: Gynecology    THROMBECTOMY / ARTERIOVENOUS GRAFT REVISION      TOE SURGERY Right     removal of the 4th toe    WOUND DEBRIDEMENT Right 10/8/2021    Procedure: R 1st MTPJ washout ;  Surgeon: Kristin Sidhu DPM;  [facility];  Service: Podiatry       Family History   Problem Relation Age of Onset    Heart disease Family     Diabetes Family     Heart disease Mother     Cancer Brother         neck    Throat cancer Brother     Ovarian cancer Maternal Aunt 36     I have reviewed and agree with the history as documented  E-Cigarette/Vaping    E-Cigarette Use Never User      E-Cigarette/Vaping Substances    Nicotine No     THC No     CBD No     Flavoring No     Other No     Unknown No      Social History     Tobacco Use    Smoking status: Never Smoker    Smokeless tobacco: Never Used   Vaping Use    Vaping Use: Never used   Substance Use Topics    Alcohol use: Never     Alcohol/week: 0 0 standard drinks    Drug use: No        Review of Systems   Constitutional: Negative for chills and fever  HENT: Negative for congestion, rhinorrhea and sore throat  Respiratory: Negative for cough and shortness of breath  Cardiovascular: Negative for chest pain and palpitations  Gastrointestinal: Negative for abdominal pain, constipation, diarrhea, nausea and vomiting  Genitourinary: Negative for difficulty urinating and flank pain  Musculoskeletal: Positive for back pain  Negative for arthralgias  Neurological: Negative for dizziness, weakness, light-headedness and headaches  Psychiatric/Behavioral: Negative for agitation, behavioral problems and confusion  All other systems reviewed and are negative  Physical Exam  ED Triage Vitals   Temperature Pulse Respirations Blood Pressure SpO2   06/11/22 0514 06/11/22 0512 06/11/22 0512 06/11/22 0514 06/11/22 0512   (!) 97 4 °F (36 3 °C) 84 18 160/73 94 %      Temp Source Heart Rate Source Patient Position - Orthostatic VS BP Location FiO2 (%)   06/11/22 0514 06/11/22 0512 06/11/22 0512 06/11/22 0512 --   Oral Monitor Lying Right arm       Pain Score       06/11/22 0512       8             Orthostatic Vital Signs  Vitals:    06/11/22 0512 06/11/22 0514   BP:  160/73   Pulse: 84    Patient Position - Orthostatic VS: Lying        Physical Exam  Constitutional:       Appearance: She is well-developed     HENT: Head: Normocephalic and atraumatic  Right Ear: Tympanic membrane normal       Left Ear: Tympanic membrane normal       Nose: Nose normal       Mouth/Throat:      Mouth: Mucous membranes are moist    Eyes:      Pupils: Pupils are equal, round, and reactive to light  Cardiovascular:      Rate and Rhythm: Normal rate and regular rhythm  Heart sounds: Normal heart sounds  No murmur heard  No friction rub  Pulmonary:      Effort: Pulmonary effort is normal  No respiratory distress  Breath sounds: Normal breath sounds  No wheezing or rales  Abdominal:      General: Bowel sounds are normal  There is no distension  Palpations: Abdomen is soft  Tenderness: There is no abdominal tenderness  Musculoskeletal:         General: Tenderness present  Normal range of motion  Cervical back: Normal range of motion and neck supple  Comments: Patient has significant tenderness in the R low back  + Straight leg raise on the R side  Negative on L  No tenderness of L low back  Patient can flex, extend the hip, flex/extend the knee, plantarflex and dorsiflex both foot without difficulty, 5/5 strength  No midline tenderness   Skin:     General: Skin is warm  Neurological:      Mental Status: She is alert and oriented to person, place, and time  Coordination: Coordination normal       Comments: Reports baseline sensation of BLLEs    Psychiatric:         Behavior: Behavior normal          Thought Content:  Thought content normal          Judgment: Judgment normal          ED Medications  Medications   lidocaine (LIDODERM) 5 % patch 1 patch (1 patch Topical Medication Applied 6/11/22 0534)   ketorolac (TORADOL) injection 15 mg (15 mg Intramuscular Given 6/11/22 0536)   acetaminophen (TYLENOL) tablet 975 mg (975 mg Oral Given 6/11/22 0534)   methocarbamol (ROBAXIN) tablet 1,000 mg (1,000 mg Oral Given 6/11/22 0534)       Diagnostic Studies  Results Reviewed     None                 No orders to display         Procedures  Procedures      ED Course                                       [facility]  Number of Diagnoses or Management Options  Low back pain  Diagnosis management comments: Patient's presentation is consistent with her chronic low back pain  Will treat her with Toradol, Tylenol, Lidoderm patch, Robaxin  Will discharge with strict return precautions, advise that she follow-up with [facility]  Disposition  Final diagnoses:   Low back pain     Time reflects when diagnosis was documented in both MDM as applicable and the Disposition within this note     Time User Action Codes Description Comment    6/11/2022  5:30 AM Alli Mueller Add [M54 50] Low back pain       ED Disposition     ED Disposition   Discharge    Condition   Stable    Date/Time   Sat Jun 11, 2022  5:28 AM    Comment   Noam Parkuntain discharge to home/self care  Follow-up Information     Follow up With Specialties Details Why Contact Info Additional Information    Diamond Moreno MD Internal Medicine Call   [address] [facility]  [facility] [address] [facility] [id number]  [facility] Physical Therapy Call   [phone]          Patient's Medications   Discharge Prescriptions    No medications on file     No discharge procedures on file  PDMP Review       Value Time User    PDMP Reviewed  Yes 11/21/2021  1:00 PM Kaylen Rizzo MD           ED Provider  Attending physically available and evaluated Noam Mike  MANISH managed the patient along with the ED Attending      Electronically Signed by         Bello Martínez MD  06/11/22 1221

## 2022-06-15 ENCOUNTER — OFFICE VISIT (OUTPATIENT)
Dept: PAIN MEDICINE | Facility: CLINIC | Age: 57
End: 2022-06-15
Payer: MEDICARE

## 2022-06-15 VITALS
BODY MASS INDEX: 45.68 KG/M2 | SYSTOLIC BLOOD PRESSURE: 126 MMHG | HEIGHT: 66 IN | HEART RATE: 82 BPM | DIASTOLIC BLOOD PRESSURE: 60 MMHG

## 2022-06-15 DIAGNOSIS — G89.4 CHRONIC PAIN SYNDROME: Primary | ICD-10-CM

## 2022-06-15 DIAGNOSIS — M54.16 LUMBAR RADICULOPATHY: Chronic | ICD-10-CM

## 2022-06-15 DIAGNOSIS — E11.42 DIABETIC POLYNEUROPATHY ASSOCIATED WITH TYPE 2 DIABETES MELLITUS (HCC): ICD-10-CM

## 2022-06-15 PROCEDURE — 99214 OFFICE O/P EST MOD 30 MIN: CPT | Performed by: NURSE PRACTITIONER

## 2022-06-15 RX ORDER — GABAPENTIN 100 MG/1
CAPSULE ORAL
COMMUNITY
Start: 2022-06-15 | End: 2022-06-15

## 2022-06-15 RX ORDER — PREGABALIN 50 MG/1
CAPSULE ORAL
Qty: 45 CAPSULE | Refills: 2 | Status: SHIPPED | OUTPATIENT
Start: 2022-06-15 | End: 2022-07-25 | Stop reason: SDUPTHER

## 2022-06-15 NOTE — PROGRESS NOTES
Assessment:  1  Chronic pain syndrome    2  Diabetic polyneuropathy associated with type 2 diabetes mellitus ([address] )    3  Lumbar radiculopathy        Plan:  1  Patient was encouraged to take pregabalin as prescribed  Right now she is only taking 1 after HD  I explained that she should take one every day and then a subsequent dose on HD days  The patient was agreeable and verbalized an understanding  2  I will order an EMG of the RLE  3  I will order an updated MRI lumbar spine for the patient to complete prior to neurosurgical consultation  Referral to neurosurgery was also provided  4  Medication options limited secondary to HD  5  Patient will follow up in 8 weeks or sooner if needed     History of Present Illness: The patient is a 56 y o  female with a history of DVT on anticoagulation, end-stage renal disease on hemodialysis and diabetes with neuropathy last seen on 4/15/22 who presents for a follow up office visit in regards to chronic low back pain that radiates into the right lower extremity  Patient denies left lower extremity symptoms, bowel or bladder incontinence or saddle anesthesia  Patient is status post L4-5 LESI on May 18, 2022 without any improvement of her symptoms  She is been taking pregabalin 50 mg daily without much relief  MRI of the lumbar spine in February 2021 reveals central stenosis L4-5 secondary to spondylosis and disc herniation  She has failed gabapentin in the past and is unable to take numerous medications secondary to ESRD  The patient rates her pain a 10/10 on the numeric pain rating scale  She constantly has pain in the morning which is described as shooting and numbness    I have personally reviewed and/or updated the patient's past medical history, past surgical history, family history, social history, current medications, allergies, and vital signs today  Review of Systems:    Review of Systems   Respiratory: Negative for shortness of breath  Cardiovascular: Negative for chest pain  Gastrointestinal: Negative for constipation, diarrhea, nausea and vomiting  Musculoskeletal: Negative for arthralgias, gait problem, joint swelling and myalgias  Skin: Negative for rash  Neurological: Negative for dizziness, seizures and weakness  All other systems reviewed and are negative          Past Medical History:   Diagnosis Date    Abnormal uterine bleeding (AUB)     Anemia     Anxiety     Arthritis     Chronic kidney disease     Claustrophobia     Diabetes mellitus ([facility] )     Disease of thyroid gland     D[facility] thrombosis) ([facility]     End stage kidney disease (HCC)     Foot ulcer due to secondary DM ([facility] )     Hemodialysis patient ([place] [address] )     Tues, Th, Sat    Hypertension     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[place] [address] )     right eye    Morbid obesity ([place] [address] )     Osteomyelitis of fifth toe of right foot (HCC)     Panic attacks     Pulmonary embolism (HCC)     Reflux esophagitis     Thrombophlebitis of saphenous vein     Warfarin anticoagulation        Past Surgical History:   Procedure Laterality Date    AMPUTATION      r 4th toe    ARTERIOVENOUS GRAFT PLACEMENT      CATARACT EXTRACTION Bilateral     CERVICAL BIOPSY  W/ LOOP ELECTRODE EXCISION       SECTION      DIALYSIS FISTULA CREATION      DILATION AND CURETTAGE OF UTERUS      ENDOMETRIAL ABLATION W/ NOVASURE      EYE SURGERY      HYSTERECTOMY      @ age 57 (complete)    IR AV FISTULAGRAM/GRAFTOGRAM  10/11/2019    IR AV FISTULAGRAM/GRAFTOGRAM  2020    IR AV FISTULAGRAM/GRAFTOGRAM  2020    IR NON-TUNNELED CENTRAL LINE PLACEMENT  2021    IR NON-TUNNELED CENTRAL LINE PLACEMENT  10/4/2021    OOPHORECTOMY Bilateral     @ age 57    PERICARDIUM SURGERY      TN AMPUTATION FOOT,TRANSMETATARSAL Right 2021    Procedure: AMPUTATION TRANSMETATARSAL (TMA);  Surgeon: Herrera Cleaning DPM;  [facility];  Service: Podiatry    TN AMPUTATION TOE,MT-P JT Right 5/28/2020    Procedure: AMPUTATION TOE- 5th;  Surgeon: Kirill Pinedo DPM;  [facility];  Service: Podiatry    WY COLONOSCOPY FLX DX W/COLLJ [address] PFRMD N/A 3/13/2019    Procedure: COLONOSCOPY;  Surgeon: Wilma Martínez MD;  [facility]: [facility]; Service: Gastroenterology    WY ESOPHAGOGASTRODUODENOSCOPY TRANSORAL DIAGNOSTIC N/A 3/13/2019    Procedure: ESOPHAGOGASTRODUODENOSCOPY (EGD); Surgeon: Wilma Martínez MD;  [facility];   Service: Gastroenterology    WY LAPAROSCOPY W TOT HYSTERECT UTERUS 250 GRAM OR LESS N/A 2/16/2016    Procedure: HYSTERECTOMY LAPAROSCOPIC TOTAL ([address]), with bilateral salpingectomy;  Surgeon: Graciela Nolan DO;  [facility];  Service: Gynecology    THROMBECTOMY / ARTERIOVENOUS GRAFT REVISION      TOE SURGERY Right     removal of the 4th toe    WOUND DEBRIDEMENT Right 10/8/2021    Procedure: R 1st MTPJ washout ;  Surgeon: Kristin Sidhu DPM;  [facility];  Service: Podiatry       Family History   Problem Relation Age of Onset    Heart disease Family     Diabetes Family     Heart disease Mother     Cancer Brother         neck    Throat cancer Brother     Ovarian cancer Maternal Aunt 36       Social History     Occupational History    Not on file   Tobacco Use    Smoking status: Never Smoker    Smokeless tobacco: Never Used   Vaping Use    Vaping Use: Never used   Substance and Sexual Activity    Alcohol use: Never     Alcohol/week: 0 0 standard drinks    Drug use: No    Sexual activity: Never     Partners: Male     Birth control/protection: Abstinence         Current Outpatient Medications:     pregabalin (LYRICA) 50 mg capsule, Take 1 PO daily, take 1 PO additionally directly have HD on HD days, Disp: 45 capsule, Rfl: 2    Accu-Chek Guide test strip, use to TEST BLOOD SUGAR two to three times a day, Disp: , Rfl:     Accu-Chek Softclix Lancets lancets, 3 (three) times a day Use to test blood sugar, Disp: , Rfl:   acetaminophen (TYLENOL) 325 mg tablet, Take 2 tablets (650 mg total) by mouth every 6 (six) hours as needed for mild pain or fever (Patient not taking: No sig reported), Disp: 30 tablet, Rfl: 0    albuterol (ProAir HFA) 90 mcg/act inhaler, Inhale 2 puffs every 6 (six) hours as needed for wheezing or shortness of breath, Disp: 8 5 g, Rfl: 0    ALPRAZolam (XANAX) 0 25 mg tablet, Take 0 25 mg by mouth 2 (two) times a day as needed for anxiety, Disp: , Rfl:     ALPRAZolam (XANAX) 0 5 mg tablet, TAKE 2 AND 1/2 TABLETS DAILY IN DIVIDED DOSES AS DIRECTED , Disp: , Rfl:     ammonium lactate (LAC-HYDRIN) 12 % cream, Apply topically 2 (two) times a day, Disp: 385 g, Rfl: 0    atorvastatin (LIPITOR) 20 mg tablet, Take 20 mg by mouth every evening , Disp: , Rfl: 0    B Complex-C-Folic Acid (Natalia-Denys) TABS, TAKE ONE TABLET BY MOUTH DAILY, Disp: 90 tablet, Rfl: 3    carvedilol (COREG) 25 mg tablet, Take 25 mg by mouth 2 (two) times a day with meals  , Disp: , Rfl:     cinacalcet (SENSIPAR) 30 mg tablet, Take 30 mg by mouth daily, Disp: , Rfl:     dicyclomine (BENTYL) 10 mg capsule, TAKE 1 TABLET BY MOUTH EVERY 6 HOURS OR AS NEEDED FOR PAIN, Disp: , Rfl:     insulin glargine (Lantus SoloStar) 100 units/mL injection pen, Inject 15 Units under the skin daily at bedtime, Disp: 15 mL, Rfl: 0    KIONEX 15 GM/60ML suspension, Take by mouth Takes as a shake on dialysis days Tues-Thurs_Sat , Disp: , Rfl: 0    levothyroxine 50 mcg tablet, Take 50 mcg by mouth daily, Disp: , Rfl:     lidocaine (LIDODERM) 5 %, , Disp: , Rfl:     loratadine (CLARITIN) 10 mg tablet, Take 10 mg by mouth daily, Disp: , Rfl:     melatonin 3 mg, Take 1 tablet (3 mg total) by mouth daily at bedtime, Disp: 30 tablet, Rfl: 0    methocarbamol (ROBAXIN) 500 mg tablet, Take 1 tablet (500 mg total) by mouth 2 (two) times a day, Disp: 20 tablet, Rfl: 0    metoclopramide (REGLAN) 10 mg/10 mL oral solution, Take 5 mL (5 mg total) by mouth every 6 (six) hours as needed (nausea), Disp: 120 mL, Rfl: 1    NIFEdipine (ADALAT CC) 30 MG 24 hr tablet, Take 1 tablet (30 mg total) by mouth daily, Disp: 30 tablet, Rfl: 0    NOVOLOG FLEXPEN 100 units/mL SOPN, INJECT 1 TO 5 UNITS SUBCUTANEOUSLY THREE TIMES A DAY BEFORE MELAS AS PER SLIDING SCALE, Disp: , Rfl:     nystatin (MYCOSTATIN) powder, Apply topically 2 (two) times a day, Disp: 15 g, Rfl: 0    ondansetron (ZOFRAN-ODT) 8 mg disintegrating tablet, , Disp: , Rfl:     oxyCODONE (ROXICODONE) 5 immediate release tablet, Take 1 tablet (5 mg total) by mouth every 4 (four) hours as needed for severe pain Max Daily Amount: 30 mg (Patient not taking: No sig reported), Disp: 15 tablet, Rfl: 0    pantoprazole (PROTONIX) 40 mg tablet, take 1 tablet by mouth twice a day, Disp: , Rfl:     polyethylene glycol (MIRALAX) 17 g packet, Take 17 g by mouth daily for 7 days, Disp: 119 g, Rfl: 0    polyethylene glycol (MIRALAX) 17 g packet, Take 17 g by mouth daily, Disp: 510 g, Rfl: 5    senna (SENOKOT) 8 6 mg, Take 2 tablets (17 2 mg total) by mouth daily at bedtime as needed for constipation, Disp: 30 each, Rfl: 0    sertraline (ZOLOFT) 50 mg tablet, Take 1 tablet (50 mg total) by mouth daily (Patient taking differently: Take 75 mg by mouth in the morning ), Disp: 30 tablet, Rfl: 0    sevelamer (RENAGEL) 800 mg tablet, Take 2 tablets (1,600 mg total) by mouth 3 (three) times a day with meals, Disp: 30 tablet, Rfl: 0    torsemide (DEMADEX) 100 mg tablet, Take 1 tablet (100 mg total) by mouth 4 (four) times a week On Sunday, Monday, Wednesday, Friday (Patient taking differently: Take 100 mg by mouth 4 (four) times a week On Sunday, Monday, Wednesday, Friday (pt states she takes it everyday)), Disp: , Rfl: 0    urea (CARMOL) 40 %, APPLY TO AFFECTED AREA TOPICALLY EVERY DAY, Disp: 85 g, Rfl: 2    warfarin (COUMADIN) 3 mg tablet, Take 3 tablets (9 mg total) by mouth daily Takes 9 mg Monday Sat, Disp: 10 tablet, Rfl: 0   zolpidem (AMBIEN) 5 mg tablet, Take 5 mg by mouth daily at bedtime, Disp: , Rfl:     Allergies   Allergen Reactions    Iodinated Diagnostic Agents Itching    Keflex [Cephalexin] Hives    Wound Dressing Adhesive Rash       Physical Exam:    /60   Pulse 82   Ht 5' 6" (1 676 m)   LMP 02/12/2016 (Exact Date)   BMI 45 68 kg/m²     Constitutional:normal, well developed, well nourished, alert, in no distress and non-toxic and no overt pain behavior  Eyes:anicteric  HEENT:grossly intact  Neck:supple, symmetric, trachea midline and no masses   Pulmonary:even and unlabored  Cardiovascular:No edema or pitting edema present  Skin:Normal without rashes or lesions and well hydrated  Psychiatric:Mood and affect appropriate  Neurologic:Cranial Nerves II-XII grossly intact  Musculoskeletal:antalgic gait, ambulates with a cane      Imaging  MRI lumbar spine without contrast    (Results Pending)   IMPRESSION:     Multilevel degenerative changes worse at L4-5 where there is fluid in the facet   joints consistent with effusions  There is a posterior disc herniation  There is   mild central canal stenosis  Abnormal appearance to kidneys  Workstation:[id number]   Narrative  History: Back pain  MRI of the lumbar spine was performed on a 1 5 [place] magnet  The following   sequences were obtained: sagittal STIR, T1-weighted and T2-weighted sequences as   well as axial T2-weighted sequences were performed  There are no prior studies for comparison  Findings:   Examination is somewhat limited by body habitus   Please note for the purposes of this dictation it is assumed that the patient   has 5 lumbar-type vertebral bodies  There is normal alignment of the lumbar spine  There is a abnormal appearance to the kidneys which demonstrate multiple cysts   and loss of cortex consistent with chronic kidney disease  Please correlate   clinically  Please correlate clinically       At L3-4 there are very mild degenerative change facets  At L4-5 there is fluid signal in the facet joints consistent with effusions is   some ligamentum flavum thickening degenerative change facets  There is a central   disc extrusion which extends up behind the inferior endplate of L4  There is   mild central canal stenosis  At L5-S1 there is no canal or neural foraminal stenosis  Other Result Text  Interface, Rad Results In - 02/09/2021 9:13 AM EST   Formatting of this note might be different from the original    History: Back pain  MRI of the lumbar spine was performed on a 1 5 [place] magnet  The following   sequences were obtained: sagittal STIR, T1-weighted and T2-weighted sequences as   well as axial T2-weighted sequences were performed  There are no prior studies for comparison  Findings:   Examination is somewhat limited by body habitus   Please note for the purposes of this dictation it is assumed that the patient   has 5 lumbar-type vertebral bodies  There is normal alignment of the lumbar spine  There is a abnormal appearance to the kidneys which demonstrate multiple cysts   and loss of cortex consistent with chronic kidney disease  Please correlate   clinically  Please correlate clinically  At L3-4 there are very mild degenerative change facets  At L4-5 there is fluid signal in the facet joints consistent with effusions is   some ligamentum flavum thickening degenerative change facets  There is a central   disc extrusion which extends up behind the inferior endplate of L4  There is   mild central canal stenosis  At L5-S1 there is no canal or neural foraminal stenosis  IMPRESSION:   IMPRESSION:     Multilevel degenerative changes worse at L4-5 where there is fluid in the facet   joints consistent with effusions  There is a posterior disc herniation  There is   mild central canal stenosis  Abnormal appearance to kidneys             Orders Placed This Encounter   Procedures    MRI lumbar spine without contrast    Ambulatory referral to Neurosurgery    EMG 1 Limb

## 2022-06-17 DIAGNOSIS — I12.0 CKD STAGE 5 SECONDARY TO HYPERTENSION (HCC): Primary | ICD-10-CM

## 2022-06-17 DIAGNOSIS — N18.5 CKD STAGE 5 SECONDARY TO HYPERTENSION (HCC): Primary | ICD-10-CM

## 2022-06-17 RX ORDER — CARVEDILOL 25 MG/1
TABLET ORAL
Qty: 180 TABLET | Refills: 7 | Status: SHIPPED | OUTPATIENT
Start: 2022-06-17

## 2022-06-21 PROBLEM — Z89.431 HISTORY OF TRANSMETATARSAL AMPUTATION OF RIGHT FOOT (HCC): Status: ACTIVE | Noted: 2020-03-11

## 2022-06-21 NOTE — PROGRESS NOTES
This patient was seen on 5/11/22  My role is Foot , Ankle, and Wound Specialist    SUBJECTIVE    Chief Complaint:  S/P transmetatarsal amputation     Patient ID: Anaya Ventura is a 56 y o  female  Chelsey Dad is here for a post-op visit  She denies pain, drainage, fevers  The following portions of the patient's history were reviewed and updated as appropriate: allergies, current medications, past family history, past medical history, past social history, past surgical history and problem list     Review of Systems   Constitutional: Negative  OBJECTIVE      Wt 128 kg (283 lb)   LMP 02/12/2016 (Exact Date)   BMI 45 68 kg/m²     Foot/Ankle Musculoskeletal Exam    General      Neurological: alert       Physical Exam  Constitutional:       General: She is not in acute distress  Appearance: Normal appearance  She is not ill-appearing, toxic-appearing or diaphoretic  Neurological:      Mental Status: She is alert  The transmetatarsal amputation Right is healed  No residual signs of infection noted  The incision is completely healed  ASSESSMENT     Diagnoses and all orders for this visit:    History of transmetatarsal amputation of right foot ([facility] )  -     Diabetic Shoe Inserts    Diabetic polyneuropathy associated with type 2 diabetes mellitus ([address] )  -     Diabetic Shoe Inserts         Problem List Items Addressed This Visit        Endocrine    Diabetic polyneuropathy associated with type 2 diabetes mellitus ([address] )    Relevant Orders    Diabetic Shoe Inserts      Other Visit Diagnoses     History of transmetatarsal amputation of right foot ([address] )    -  Primary    Relevant Orders    Diabetic Shoe Inserts              PLAN    I wrote a prescription for a partial foot filler to allow for ambulation in diabetic shoegear   High risk  foot precautions reviewed with patient including the need to wear protective shoegear at all times when walking including in the home, the need to check feet all surfaces daily with a mirror and report and skin breaks to podiatry, the need to apply an emollient to skin of feet daily

## 2022-06-21 NOTE — PATIENT INSTRUCTIONS
Foot Care for People with Diabetes   WHAT YOU NEED TO KNOW:   Foot care helps protect your feet and prevent foot ulcers or sores  Long-term high blood sugar levels can damage the blood vessels and nerves in your legs and feet  This damage makes it hard to feel pressure, pain, temperature, and touch  You may not be able to feel a cut or sore, or shoes that are too tight  Foot care is needed to prevent serious problems, such as an infection or amputation  Diabetes may cause your toes to become crooked or curved under  These changes may affect the way you walk and can lead to increased pressure on your foot  The pressure can decrease blood flow to your feet  Lack of blood flow increases your risk for a foot ulcer  Do not ignore small problems, such as dry skin or small wounds  These can become life-threatening over time without proper care  DISCHARGE INSTRUCTIONS:   Call your care team provider if:   Your feet become numb, weak, or hard to move  You have pus draining from a sore on your foot  You have a wound on your foot that gets bigger, deeper, or does not heal      You see blisters, cuts, scratches, calluses, or sores on your foot  You have a fever, and your feet become red, warm, and swollen  Your toenails become thick, curled, or yellow  You find it hard to check your feet because your vision is poor  You have questions or concerns about your condition or care  Foot care:   Check your feet each day  Look at your whole foot, including the bottom, and between and under your toes  Check for wounds, corns, and calluses  Use a mirror to see the bottom of your feet  The skin on your feet may be shiny, tight, or darker than normal  Your feet may also be cold and pale  Feel your feet by running your hands along the tops, bottoms, sides, and between your toes  Redness, swelling, and warmth are signs of blood flow problems that can lead to a foot ulcer   Do not try to remove corns or calluses yourself  Wash your feet each day with soap and warm water  Do not use hot water, because this can injure your foot  Dry your feet gently with a towel after you wash them  Dry between and under your toes  Apply lotion or a moisturizer on your dry feet  Ask your care team provider what lotions are best to use  Do not put lotion or moisturizer between your toes  Moisture between your toes could lead to skin breakdown  Cut your toenails correctly  File or cut your toenails straight across  Use a soft brush to clean around your toenails  If your toenails are very thick, you may need to have a care team provider or specialist cut them  Protect your feet  Do not walk barefoot or wear your shoes without socks  Check your shoes for rocks or other objects that can hurt your feet  Wear cotton socks to help keep your feet dry  Wear socks without toe seams, or wear them with the seams inside out  Change your socks each day  Do not wear socks that are dirty or damp  Wear shoes that fit well  Wear shoes that do not rub against any area of your feet  Your shoes should be ½ to ¾ inch (1 to 2 centimeters) longer than your feet  Your shoes should also have extra space around the widest part of your feet  Walking or athletic shoes with laces or straps that adjust are best  Ask your care team provider for help to choose shoes that fit you best  Ask him or her if you need to wear an insert, orthotic, or bandage on your feet  Do not smoke  Smoking can damage your blood vessels and put you at increased risk for foot ulcers  Ask your care team provider for information if you currently smoke and need help to quit  E-cigarettes or smokeless tobacco still contain nicotine  Talk to your care team provider before you use these products  Follow up with your diabetes care team provider or foot specialist as directed: You will need to have your feet checked at least once a year   You may need a foot exam more often if you have nerve damage, foot deformities, or ulcers  Write down your questions so you remember to ask them during your visits  © Copyright [employer] 2022 Information is for End User's use only and may not be sold, redistributed or otherwise used for commercial purposes  All illustrations and images included in CareNotes® are the copyrighted property of [employer]  or [facility] Carolyn Astorga   The above information is an  only  It is not intended as medical advice for individual conditions or treatments  Talk to your doctor, nurse or pharmacist before following any medical regimen to see if it is safe and effective for you

## 2022-06-27 ENCOUNTER — TELEPHONE (OUTPATIENT)
Dept: PAIN MEDICINE | Facility: CLINIC | Age: 57
End: 2022-06-27

## 2022-06-27 DIAGNOSIS — M54.40 LOW BACK PAIN WITH SCIATICA, SCIATICA LATERALITY UNSPECIFIED, UNSPECIFIED BACK PAIN LATERALITY, UNSPECIFIED CHRONICITY: ICD-10-CM

## 2022-06-27 DIAGNOSIS — M54.16 LUMBAR RADICULOPATHY: Primary | Chronic | ICD-10-CM

## 2022-06-27 NOTE — TELEPHONE ENCOUNTER
Patient   [phone]  KEVIN Lamb    Patient is calling in stating that the medication that was given is not working for her  She is still having pain, pain level is a 10  Pt is asking if she can try something else?

## 2022-06-27 NOTE — TELEPHONE ENCOUNTER
S/w pt, she has tried the increase for the Lyrica 50 mg in the AM and BID on HD days with no relief  Patient said she still has a sharp 10/10 pain on her R lateral lower back, pt said she walks hunched over  Pt wants to know what else she can do

## 2022-06-28 ENCOUNTER — HOSPITAL ENCOUNTER (EMERGENCY)
Facility: HOSPITAL | Age: 57
Discharge: HOME | End: 2022-06-28
Attending: EMERGENCY MEDICINE
Payer: MEDICARE

## 2022-06-28 ENCOUNTER — APPOINTMENT (EMERGENCY)
Dept: RADIOLOGY | Facility: HOSPITAL | Age: 57
End: 2022-06-28
Payer: MEDICARE

## 2022-06-28 ENCOUNTER — APPOINTMENT (EMERGENCY)
Dept: DIALYSIS | Facility: HOSPITAL | Age: 57
End: 2022-06-28
Payer: MEDICARE

## 2022-06-28 VITALS
HEIGHT: 66 IN | RESPIRATION RATE: 17 BRPM | DIASTOLIC BLOOD PRESSURE: 69 MMHG | TEMPERATURE: 97.4 F | OXYGEN SATURATION: 99 % | HEART RATE: 73 BPM | SYSTOLIC BLOOD PRESSURE: 159 MMHG | WEIGHT: 282.19 LBS | BODY MASS INDEX: 45.35 KG/M2

## 2022-06-28 DIAGNOSIS — K57.90 DIVERTICULOSIS: ICD-10-CM

## 2022-06-28 DIAGNOSIS — M54.9 BACK PAIN: ICD-10-CM

## 2022-06-28 DIAGNOSIS — D32.9 MENINGIOMA (HCC): ICD-10-CM

## 2022-06-28 DIAGNOSIS — N28.1 RENAL CYST: ICD-10-CM

## 2022-06-28 DIAGNOSIS — M47.816 DEGENERATIVE ARTHRITIS OF LUMBAR SPINE: ICD-10-CM

## 2022-06-28 DIAGNOSIS — N20.0 RENAL CALCULI: ICD-10-CM

## 2022-06-28 DIAGNOSIS — R03.0 ELEVATED BLOOD PRESSURE READING: ICD-10-CM

## 2022-06-28 DIAGNOSIS — Z99.2 ESRD ON HEMODIALYSIS (HCC): Chronic | ICD-10-CM

## 2022-06-28 DIAGNOSIS — N18.6 ESRD ON HEMODIALYSIS (HCC): Chronic | ICD-10-CM

## 2022-06-28 DIAGNOSIS — R51.9 ACUTE HEADACHE: Primary | ICD-10-CM

## 2022-06-28 DIAGNOSIS — R10.9 ACUTE ABDOMINAL PAIN: ICD-10-CM

## 2022-06-28 LAB
ALBUMIN SERPL BCP-MCNC: 2.9 G/DL (ref 3.5–5)
ALP SERPL-CCNC: 177 U/L (ref 46–116)
ALT SERPL W P-5'-P-CCNC: 19 U/L (ref 12–78)
ANION GAP SERPL CALCULATED.3IONS-SCNC: 12 MMOL/L (ref 4–13)
AST SERPL W P-5'-P-CCNC: 10 U/L (ref 5–45)
ATRIAL RATE: 76 BPM
BASOPHILS # BLD AUTO: 0.02 THOUSANDS/ΜL (ref 0–0.1)
BASOPHILS NFR BLD AUTO: 0 % (ref 0–1)
BILIRUB SERPL-MCNC: 0.6 MG/DL (ref 0.2–1)
BUN SERPL-MCNC: 94 MG/DL (ref 5–25)
CALCIUM ALBUM COR SERPL-MCNC: 9.4 MG/DL (ref 8.3–10.1)
CALCIUM SERPL-MCNC: 8.5 MG/DL (ref 8.3–10.1)
CHLORIDE SERPL-SCNC: 101 MMOL/L (ref 100–108)
CO2 SERPL-SCNC: 23 MMOL/L (ref 21–32)
CREAT SERPL-MCNC: 11 MG/DL (ref 0.6–1.3)
EOSINOPHIL # BLD AUTO: 0.17 THOUSAND/ΜL (ref 0–0.61)
EOSINOPHIL NFR BLD AUTO: 2 % (ref 0–6)
ERYTHROCYTE [DISTWIDTH] IN BLOOD BY AUTOMATED COUNT: 13 % (ref 11.6–15.1)
ERYTHROCYTE [SEDIMENTATION RATE] IN BLOOD: 41 MM/HOUR (ref 0–29)
GFR SERPL CREATININE-BSD FRML MDRD: 3 ML/MIN/1.73SQ M
GLUCOSE SERPL-MCNC: 195 MG/DL (ref 65–140)
GLUCOSE SERPL-MCNC: 208 MG/DL (ref 65–140)
HCT VFR BLD AUTO: 27.2 % (ref 34.8–46.1)
HGB BLD-MCNC: 9 G/DL (ref 11.5–15.4)
IMM GRANULOCYTES # BLD AUTO: 0.04 THOUSAND/UL (ref 0–0.2)
IMM GRANULOCYTES NFR BLD AUTO: 1 % (ref 0–2)
INR PPP: 2.43 (ref 0.84–1.19)
LYMPHOCYTES # BLD AUTO: 1.19 THOUSANDS/ΜL (ref 0.6–4.47)
LYMPHOCYTES NFR BLD AUTO: 16 % (ref 14–44)
MCH RBC QN AUTO: 31 PG (ref 26.8–34.3)
MCHC RBC AUTO-ENTMCNC: 33.1 G/DL (ref 31.4–37.4)
MCV RBC AUTO: 94 FL (ref 82–98)
MONOCYTES # BLD AUTO: 0.51 THOUSAND/ΜL (ref 0.17–1.22)
MONOCYTES NFR BLD AUTO: 7 % (ref 4–12)
NEUTROPHILS # BLD AUTO: 5.63 THOUSANDS/ΜL (ref 1.85–7.62)
NEUTS SEG NFR BLD AUTO: 74 % (ref 43–75)
NRBC BLD AUTO-RTO: 0 /100 WBCS
P AXIS: 43 DEGREES
PLATELET # BLD AUTO: 130 THOUSANDS/UL (ref 149–390)
PMV BLD AUTO: 11.4 FL (ref 8.9–12.7)
POTASSIUM SERPL-SCNC: 6.3 MMOL/L (ref 3.5–5.3)
PR INTERVAL: 174 MS
PROT SERPL-MCNC: 6.8 G/DL (ref 6.4–8.2)
PROTHROMBIN TIME: 25.2 SECONDS (ref 11.6–14.5)
QRS AXIS: 45 DEGREES
QRSD INTERVAL: 96 MS
QT INTERVAL: 410 MS
QTC INTERVAL: 461 MS
RBC # BLD AUTO: 2.9 MILLION/UL (ref 3.81–5.12)
SODIUM SERPL-SCNC: 136 MMOL/L (ref 136–145)
T WAVE AXIS: 100 DEGREES
VENTRICULAR RATE: 76 BPM
WBC # BLD AUTO: 7.56 THOUSAND/UL (ref 4.31–10.16)

## 2022-06-28 PROCEDURE — 82948 REAGENT STRIP/BLOOD GLUCOSE: CPT

## 2022-06-28 PROCEDURE — 74176 CT ABD & PELVIS W/O CONTRAST: CPT

## 2022-06-28 PROCEDURE — 85610 PROTHROMBIN TIME: CPT | Performed by: EMERGENCY MEDICINE

## 2022-06-28 PROCEDURE — 85025 COMPLETE CBC W/AUTO DIFF WBC: CPT | Performed by: EMERGENCY MEDICINE

## 2022-06-28 PROCEDURE — 93005 ELECTROCARDIOGRAM TRACING: CPT

## 2022-06-28 PROCEDURE — G0257 UNSCHED DIALYSIS ESRD PT HOS: HCPCS

## 2022-06-28 PROCEDURE — 85652 RBC SED RATE AUTOMATED: CPT | Performed by: EMERGENCY MEDICINE

## 2022-06-28 PROCEDURE — 70450 CT HEAD/BRAIN W/O DYE: CPT

## 2022-06-28 PROCEDURE — 99285 EMERGENCY DEPT VISIT HI MDM: CPT | Performed by: EMERGENCY MEDICINE

## 2022-06-28 PROCEDURE — G1004 CDSM NDSC: HCPCS

## 2022-06-28 PROCEDURE — 80053 COMPREHEN METABOLIC PANEL: CPT | Performed by: EMERGENCY MEDICINE

## 2022-06-28 PROCEDURE — 93010 ELECTROCARDIOGRAM REPORT: CPT | Performed by: INTERNAL MEDICINE

## 2022-06-28 PROCEDURE — 99284 EMERGENCY DEPT VISIT MOD MDM: CPT | Performed by: INTERNAL MEDICINE

## 2022-06-28 PROCEDURE — 99284 EMERGENCY DEPT VISIT MOD MDM: CPT

## 2022-06-28 RX ORDER — METOCLOPRAMIDE 5 MG/1
5 TABLET ORAL ONCE
Status: COMPLETED | OUTPATIENT
Start: 2022-06-28 | End: 2022-06-28

## 2022-06-28 RX ORDER — LIDOCAINE 50 MG/G
1 PATCH TOPICAL ONCE
Status: DISCONTINUED | OUTPATIENT
Start: 2022-06-28 | End: 2022-06-28 | Stop reason: HOSPADM

## 2022-06-28 RX ORDER — CARVEDILOL 25 MG/1
25 TABLET ORAL ONCE
Status: DISCONTINUED | OUTPATIENT
Start: 2022-06-28 | End: 2022-06-28

## 2022-06-28 RX ORDER — METOCLOPRAMIDE HYDROCHLORIDE 5 MG/ML
10 INJECTION INTRAMUSCULAR; INTRAVENOUS ONCE
Status: DISCONTINUED | OUTPATIENT
Start: 2022-06-28 | End: 2022-06-28

## 2022-06-28 RX ADMIN — LIDOCAINE 5% 1 PATCH: 700 PATCH TOPICAL at 18:02

## 2022-06-28 RX ADMIN — METOCLOPRAMIDE 5 MG: 5 TABLET ORAL at 18:02

## 2022-06-28 NOTE — ED PROVIDER NOTES
History  Chief Complaint   Patient presents with    Headache     Patient complains of headache x 3 days with pain on the right side  Also complains of difficulty walking  Patient is a 61-year-old female, with a history significant for non anuric ESRD on Tuesday Thursday Saturday dialysis (missed last appointment on Saturday), chronic back pain managed by pain management, VTE anticoagulated with warfarin, who presents to the ED today with multiple complaints  Primarily, patient reports a three day history of sudden onset, constant, atraumatic, and severe intensity right-sided headache that is characterized as a right-sided burning sensation  Notably, patient states that she is blind in her right eye  She denies any new vision change, difficulty swallowing, weakness  Patient also reports chronic right lower extremity numbness and she feels as though the frequency of this occurring is increasing  Movement exacerbates her symptoms  Patient has taken Tylenol, roughly 2 2 g daily, to remit her symptoms  Patient also reports acute on chronic back pain  Currently, she describes that pain as being primarily felt in the right flank  This discomfort is also atraumatic  Patient denies history of cancer, IVDU, bowel or bladder incontinence, saddle anesthesia, urinary retention  The onset of this pain occurred three days ago along with a headache  There is associated difficulty walking, occasional radiation down the right leg  Patient has been following with pain management for her back pain  Patient reports dysuria  Patient is without other concerns this time     - No language barrier    - History obtained from patient  - There are no limitations to the history obtained  - Previous charting was reviewed          Prior to Admission Medications   Prescriptions Last Dose Informant Patient Reported? Taking?    ALPRAZolam (XANAX) 0 25 mg tablet  Self Yes No   Sig: Take 0 25 mg by mouth 2 (two) times a day as needed for anxiety   ALPRAZolam (XANAX) 0 5 mg tablet   Yes No   Sig: TAKE 2 AND 1/2 TABLETS DAILY IN DIVIDED DOSES AS DIRECTED     Accu-Chek Guide test strip   Yes No   Sig: use to TEST BLOOD SUGAR two to three times a day   Accu-Chek Softclix Lancets lancets  Self Yes No   Sig: 3 (three) times a day Use to test blood sugar   B Complex-C-Folic Acid (Natalia-Denys) TABS   No No   Sig: TAKE ONE TABLET BY MOUTH DAILY   KIONEX 15 GM/60ML suspension  Self Yes No   Sig: Take by mouth Takes as a shake on dialysis days Tues-Thurs_Sat    NIFEdipine (ADALAT CC) 30 MG 24 hr tablet   No No   Sig: Take 1 tablet (30 mg total) by mouth daily   NOVOLOG FLEXPEN 100 units/mL SOPN  Self Yes No   Sig: INJECT 1 TO 5 UNITS SUBCUTANEOUSLY THREE TIMES A DAY BEFORE MELAS AS PER SLIDING SCALE   acetaminophen (TYLENOL) 325 mg tablet   No No   Sig: Take 2 tablets (650 mg total) by mouth every 6 (six) hours as needed for mild pain or fever   Patient not taking: No sig reported   albuterol (ProAir HFA) 90 mcg/act inhaler   No No   Sig: Inhale 2 puffs every 6 (six) hours as needed for wheezing or shortness of breath   ammonium lactate (LAC-HYDRIN) 12 % cream   No No   Sig: Apply topically 2 (two) times a day   atorvastatin (LIPITOR) 20 mg tablet  Self Yes No   Sig: Take 20 mg by mouth every evening    carvedilol (COREG) 25 mg tablet   No No   Sig: TAKE ONE TABLET BY MOUTH TWICE A DAY   cinacalcet (SENSIPAR) 30 mg tablet  Self Yes No   Sig: Take 30 mg by mouth daily   dicyclomine (BENTYL) 10 mg capsule   Yes No   Sig: TAKE 1 TABLET BY MOUTH EVERY 6 HOURS OR AS NEEDED FOR PAIN   insulin glargine (Lantus SoloStar) 100 units/mL injection pen   No No   Sig: Inject 15 Units under the skin daily at bedtime   levothyroxine 50 mcg tablet  Self Yes No   Sig: Take 50 mcg by mouth daily   lidocaine (LIDODERM) 5 %   Yes No   loratadine (CLARITIN) 10 mg tablet  Self Yes No   Sig: Take 10 mg by mouth daily   melatonin 3 mg   No No   Sig: Take 1 tablet (3 mg total) by mouth daily at bedtime   methocarbamol (ROBAXIN) 500 mg tablet   No No   Sig: Take 1 tablet (500 mg total) by mouth 2 (two) times a day   metoclopramide (REGLAN) 10 mg/10 mL oral solution   No No   Sig: Take 5 mL (5 mg total) by mouth every 6 (six) hours as needed (nausea)   nystatin (MYCOSTATIN) powder  Self No No   Sig: Apply topically 2 (two) times a day   ondansetron (ZOFRAN-ODT) 8 mg disintegrating tablet   Yes No   oxyCODONE (ROXICODONE) 5 immediate release tablet   No No   Sig: Take 1 tablet (5 mg total) by mouth every 4 (four) hours as needed for severe pain Max Daily Amount: 30 mg   Patient not taking: No sig reported   pantoprazole (PROTONIX) 40 mg tablet   Yes No   Sig: take 1 tablet by mouth twice a day   polyethylene glycol (MIRALAX) 17 g packet   No No   Sig: Take 17 g by mouth daily for 7 days   polyethylene glycol (MIRALAX) 17 g packet   No No   Sig: Take 17 g by mouth daily   pregabalin (LYRICA) 50 mg capsule   No No   Sig: Take 1 PO daily, take 1 PO additionally directly have HD on HD days   senna (SENOKOT) 8 6 mg  Self No No   Sig: Take 2 tablets (17 2 mg total) by mouth daily at bedtime as needed for constipation   sertraline (ZOLOFT) 50 mg tablet  Self No No   Sig: Take 1 tablet (50 mg total) by mouth daily   Patient taking differently: Take 75 mg by mouth in the morning     sevelamer (RENAGEL) 800 mg tablet   No No   Sig: Take 2 tablets (1,600 mg total) by mouth 3 (three) times a day with meals   torsemide (DEMADEX) 100 mg tablet   No No   Sig: Take 1 tablet (100 mg total) by mouth 4 (four) times a week On Sunday, Monday, Wednesday, Friday   Patient taking differently: Take 100 mg by mouth 4 (four) times a week On Sunday, Monday, Wednesday, Friday (pt states she takes it everyday)   urea (CARMOL) 40 %   No No   Sig: APPLY TO AFFECTED AREA TOPICALLY EVERY DAY   warfarin (COUMADIN) 3 mg tablet  Self No No   Sig: Take 3 tablets (9 mg total) by mouth daily Takes 9 mg Monday Sat   zolpidem (AMBIEN) 5 mg tablet   Yes No   Sig: Take 5 mg by mouth daily at bedtime      Facility-Administered Medications: None       Past Medical History:   Diagnosis Date    Abnormal uterine bleeding (AUB)     Anemia     Anxiety     Arthritis     Chronic kidney disease     Claustrophobia     Diabetes mellitus ([place] [address] )     Disease of thyroid gland     DVT (deep venous thrombosis) (HCC)     End stage kidney disease (HCC)     [facility] ulcer due to secondary DM [facility] )     Hemodialysis patient ([place] [address] )     Tues, Thurs, Sat    Hypertension     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[place] [address] )     right eye    Morbid obesity ([place] [address] )     Osteomyelitis of fifth toe of right foot (HCC)     Panic attacks     Pulmonary embolism (HCC)     Reflux esophagitis     Thrombophlebitis of saphenous vein     Warfarin anticoagulation        Past Surgical History:   Procedure Laterality Date    AMPUTATION      r 4th toe    ARTERIOVENOUS GRAFT PLACEMENT      CATARACT EXTRACTION Bilateral     CERVICAL BIOPSY  W/ LOOP ELECTRODE EXCISION       SECTION      DIALYSIS FISTULA CREATION      DILATION AND CURETTAGE OF UTERUS      ENDOMETRIAL ABLATION W/ NOVASURE      EYE SURGERY      HYSTERECTOMY      @ age 57 (complete)    IR AV FISTULAGRAM/GRAFTOGRAM  10/11/2019    IR AV FISTULAGRAM/GRAFTOGRAM  2020    IR AV FISTULAGRAM/GRAFTOGRAM  2020    IR NON-TUNNELED CENTRAL LINE PLACEMENT  2021    IR NON-TUNNELED CENTRAL LINE PLACEMENT  10/4/2021    OOPHORECTOMY Bilateral     @ age 57    PERICARDIUM SURGERY      UT AMPUTATION FOOT,TRANSMETATARSAL Right 2021    Procedure: AMPUTATION TRANSMETATARSAL (TMA);  Surgeon: Bella Zazueta DPM;  [facility];  Service: Podiatry    UT AMPUTATION TOE,MT-P JT Right 2020    Procedure: AMPUTATION TOE- 5th;  Surgeon: Bella Zazueta DPM;  [facility];  Service: Podiatry    UT COLONOSCOPY FLX DX W/COLLJ SPEC WHEN PFRMD N/A 3/13/2019    Procedure: COLONOSCOPY;  Surgeon: Jenny Rothman MD;  Location: [facility]; Service: Gastroenterology    VA ESOPHAGOGASTRODUODENOSCOPY TRANSORAL DIAGNOSTIC N/A 3/13/2019    Procedure: ESOPHAGOGASTRODUODENOSCOPY (EGD); Surgeon: Jenny Rothman MD;  [facility]: [facility]; Service: Gastroenterology    VA LAPAROSCOPY W TOT HYSTERECT UTERUS 250 GRAM OR LESS N/A 2/16/2016    Procedure: HYSTERECTOMY LAPAROSCOPIC TOTAL TWIN [facility]), with bilateral salpingectomy;  Surgeon: Pastor Shayla DO;  [facility];  Service: Gynecology    THROMBECTOMY / ARTERIOVENOUS GRAFT REVISION      TOE SURGERY Right     removal of the 4th toe    WOUND DEBRIDEMENT Right 10/8/2021    Procedure: R 1st MTPJ washout ;  Surgeon: Shahbaz Staton DPM;  [facility];  Service: Podiatry       Family History   Problem Relation Age of Onset    Heart disease Family     Diabetes Family     Heart disease Mother     Cancer Brother         neck    Throat cancer Brother     Ovarian cancer Maternal Aunt 36     I have reviewed and agree with the history as documented  E-Cigarette/Vaping    E-Cigarette Use Never User      E-Cigarette/Vaping Substances    Nicotine No     THC No     CBD No     Flavoring No     Other No     Unknown No      Social History     Tobacco Use    Smoking status: Never Smoker    Smokeless tobacco: Never Used   Vaping Use    Vaping Use: Never used   Substance Use Topics    Alcohol use: Never     Alcohol/week: 0 0 standard drinks    Drug use: No        Review of Systems   Constitutional: Negative for fever  HENT: Negative for trouble swallowing  Eyes: Negative for visual disturbance  Respiratory: Negative for shortness of breath  Cardiovascular: Negative for chest pain  Gastrointestinal: Negative for abdominal pain  Endocrine: Negative for polyuria  Genitourinary: Positive for dysuria  Musculoskeletal: Positive for gait problem  Skin: Negative for rash     Allergic/Immunologic: Positive for environmental allergies  Neurological: Positive for numbness and headaches  Negative for weakness  Hematological: Negative for adenopathy  Psychiatric/Behavioral: Negative for confusion  All other systems reviewed and are negative  Physical Exam  ED Triage Vitals [06/28/22 1301]   Temperature Pulse Respirations Blood Pressure SpO2   (!) 97 4 °F (36 3 °C) 76 18 (!) 212/95 95 %      Temp Source Heart Rate Source Patient Position - Orthostatic VS BP Location FiO2 (%)   Oral Monitor Lying Right arm --      Pain Score       8             Orthostatic Vital Signs  Vitals:    06/28/22 1600 06/28/22 1630 06/28/22 1653 06/28/22 1759   BP: 159/89 (!) 171/42 (!) 136/102 159/69   Pulse: 74 84 65 73   Patient Position - Orthostatic VS:   Lying Lying       Physical Exam  Vitals and nursing note reviewed  Constitutional:       General: She is not in acute distress  Appearance: She is obese  She is ill-appearing (Chronic appearing )  She is not toxic-appearing or diaphoretic  Comments: Patient appears uncomfortable during my evaluation    HENT:      Head: Normocephalic and atraumatic  Comments: Tenderness to palpation of the right temple     Right Ear: External ear normal       Left Ear: External ear normal       Nose: Nose normal  No rhinorrhea  Mouth/Throat:      Mouth: Mucous membranes are moist       Pharynx: Oropharynx is clear  No oropharyngeal exudate or posterior oropharyngeal erythema  Eyes:      General: No scleral icterus  Right eye: No discharge  Left eye: No discharge  Extraocular Movements: Extraocular movements intact  Conjunctiva/sclera: Conjunctivae normal       Pupils: Pupils are equal, round, and reactive to light  Cardiovascular:      Rate and Rhythm: Normal rate and regular rhythm  Pulses: Normal pulses  Heart sounds: Normal heart sounds  No murmur heard  No friction rub  No gallop        Comments: 2+ Radial  Pulmonary:      Effort: Pulmonary effort is normal  No respiratory distress  Breath sounds: Normal breath sounds  No stridor  No wheezing, rhonchi or rales  Abdominal:      General: Abdomen is flat  There is no distension  Palpations: Abdomen is soft  Tenderness: There is abdominal tenderness (Suprapubic)  There is right CVA tenderness and guarding  There is no left CVA tenderness or rebound  Comments: Palpation of the right CVA area elicits pain patient states is what is bringing her in today   Musculoskeletal:         General: No deformity  Cervical back: Normal range of motion and neck supple  No rigidity or tenderness  No muscular tenderness  Comments: Patient reports midline lumbar spine tenderness; however, when asked where she feels the pain, she says it is in her right flank    On exam, due to body habitus, the skin when palpating midline its be depressed and this pulls taught at the right CVA area   Lymphadenopathy:      Cervical: No cervical adenopathy  Skin:     General: Skin is warm and dry  Capillary Refill: Capillary refill takes less than 2 seconds  Neurological:      Mental Status: She is alert  Comments: Patient is speaking clearly in complete sentences  Patient is answering appropriately and able follow commands  Patient is moving all four extremities spontaneously  No facial droop  Tongue midline  Cranial nerves 2-12 intact  Intact L5 and S1 motor  function  No saddle anesthesia  No pronator drift      Psychiatric:         Mood and Affect: Mood normal          Behavior: Behavior normal          ED Medications  Medications   lidocaine (LIDODERM) 5 % patch 1 patch (has no administration in time range)   metoclopramide (REGLAN) tablet 5 mg (has no administration in time range)       Diagnostic Studies  Results Reviewed     Procedure Component Value Units Date/Time    Comprehensive metabolic panel [[id number]]  (Abnormal) Collected: 06/28/22 7930    Lab Status: Final result Specimen: Blood Updated: 06/28/22 1554     Sodium 136 mmol/L      Potassium 6 3 mmol/L      Chloride 101 mmol/L      CO2 23 mmol/L      ANION GAP 12 mmol/L      BUN 94 mg/dL      Creatinine 11 00 mg/dL      Glucose 195 mg/dL      Calcium 8 5 mg/dL      Corrected Calcium 9 4 mg/dL      AST 10 U/L      ALT 19 U/L      Alkaline Phosphatase 177 U/L      Total Protein 6 8 g/dL      Albumin 2 9 g/dL      Total Bilirubin 0 60 mg/dL      eGFR 3 ml/min/1 73sq m     Narrative:      National Kidney Disease Foundation guidelines for Chronic Kidney Disease (CKD):     Stage 1 with normal or high GFR (GFR > 90 mL/min/1 73 square meters)    Stage 2 Mild CKD (GFR = 60-89 mL/min/1 73 square meters)    Stage 3A Moderate CKD (GFR = 45-59 mL/min/1 73 square meters)    Stage 3B Moderate CKD (GFR = 30-44 mL/min/1 73 square meters)    Stage 4 Severe CKD (GFR = 15-29 mL/min/1 73 square meters)    Stage 5 End Stage CKD (GFR <15 mL/min/1 73 square meters)  Note: GFR calculation is accurate only with a steady state creatinine    Sedimentation rate, automated [[id number]]  (Abnormal) Resulted: 06/28/22 1546    Lab Status: Final result Specimen: Blood Updated: 06/28/22 1546     Sed Rate 41 mm/hour     Protime-INR [[id number]]  (Abnormal) Resulted: 06/28/22 1532    Lab Status: Final result Specimen: Blood Updated: 06/28/22 1532     Protime 25 2 seconds      INR 2 43    CBC and differential [[id number]]  (Abnormal) Resulted: 06/28/22 1510    Lab Status: Final result Specimen: Blood Updated: 06/28/22 1510     WBC 7 56 Thousand/uL      RBC 2 90 Million/uL      Hemoglobin 9 0 g/dL      Hematocrit 27 2 %      MCV 94 fL      MCH 31 0 pg      MCHC 33 1 g/dL      RDW 13 0 %      MPV 11 4 fL      Platelets 436 Thousands/uL      nRBC 0 /100 WBCs      Neutrophils Relative 74 %      Immat GRANS % 1 %      Lymphocytes Relative 16 %      Monocytes Relative 7 %      Eosinophils Relative 2 %      Basophils Relative 0 %      Neutrophils Absolute 5 63 Thousands/µL Immature Grans Absolute 0 04 Thousand/uL      Lymphocytes Absolute 1 19 Thousands/µL      Monocytes Absolute 0 51 Thousand/µL      Eosinophils Absolute 0 17 Thousand/µL      Basophils Absolute 0 02 Thousands/µL     Narrative: This is an appended report  These results have been appended to a previously verified report  Fingerstick Glucose (POCT) [[id number]]  (Abnormal) Collected: 06/28/22 1446    Lab Status: Final result Updated: 06/28/22 1447     POC Glucose 208 mg/dl     UA w Reflex to Microscopic w Reflex to Culture [[id number]]     Lab Status: No result Specimen: Urine                  CT head without contrast   Final Result by Juventino Escalante MD (06/28 1432)      No acute intracranial abnormality  Stable left frontal extra-axial calcified meningioma measuring 12 x 11 mm  Workstation performed: [id number]         CT abdomen pelvis wo contrast   Final Result by Jenelle Lopes DO (06/28 5538)   1  No CT evidence of acute process within the abdomen or pelvis within the confines of a noncontrast study  2   Stable findings as detailed above  Workstation performed: [id number]               Procedures  Procedures      ED Course  ED Course as of 06/28/22 1759   Tue Jun 28, 2022   1528 CT imaging notable for: Bladder is nondistended   1535 POCT INR(!): 2 43   1535 ECG: Normal rate, regular rhythm, no ectopy, normal axis, no ST elevations or depressions  No significant change from prior  1554 Sed Rate(!): 41  Baseline   1759 Patient has neither questions nor concerns after receiving discharge instructions and return precautions  Patient back from dialysis  She appears more comfortable   She is tolerating PO                                         MDM  Number of Diagnoses or Management Options  Acute abdominal pain  Acute headache  Back pain  Degenerative arthritis of lumbar spine  Diverticulosis  Elevated blood pressure reading  ESRD on hemodialysis  Meningioma [facility])  Renal calculi  Renal cyst  Diagnosis management comments: Patient is a 63-year-old female, with a history significant for non anuric ESRD on Tuesday Thursday Saturday dialysis (missed last appointment on Saturday), chronic back pain managed by pain management, VTE anticoagulated with warfarin, who presents to the ED today with multiple complaints  Primarily, patient reports a three day history of sudden onset, constant, atraumatic, and severe intensity right-sided headache that is characterized as a right-sided burning sensation  Notably, patient states that she is blind in her right eye  She denies any new vision change, difficulty swallowing, weakness  Patient also reports chronic right lower extremity numbness and she feels as though the frequency of this occurring is increasing  Patient also reports acute on chronic back pain  Currently, she describes that pain as being primarily felt in the right flank  This discomfort is also atraumatic  Patient denies history of cancer, IVDU, bowel or bladder incontinence, saddle anesthesia, urinary retention  The onset of this pain occurred three days ago along with a headache  There is associated difficulty walking, occasional radiation down the right leg  Patient has been following with pain management for her back pain  Patient reports dysuria  Patient is currently afebrile, hypertensive, otherwise hemodynamically stable  Her physical exam is notable for right flank pain tenderness palpation, tenderness palpation the right temple  This presentation is concerning for:  Primary headache, ICH, temporal arteritis, chronic back pain, electrolyte abnormality, UTI/pyelonephritis, ureterolithiasis  Low clinical suspicion for cauda equina, conus medullaris, epidural abscess based on history and physical exam   Will discuss with CT head, CT abdomen pelvis, ECG, CBC, CMP, UA, INR, ESR    Will manage with Reglan, Lidoderm patch, home BP med (has not taken today) further based on workup  Disposition  Final diagnoses:   ESRD on hemodialysis   Meningioma (HCC)   Back pain   Acute abdominal pain   Acute headache   Degenerative arthritis of lumbar spine   Diverticulosis   Elevated blood pressure reading   Renal cyst   Renal calculi     Time reflects when diagnosis was documented in both MDM as applicable and the Disposition within this note     Time User Action Codes Description Comment    6/28/2022  2:40 PM Delpha Torrez Add [N70 6,  Z99 2] ESRD on hemodialysis     6/28/2022  2:52 PM Delpha Torrez A Add [D32 9] Meningioma ([address] )     6/28/2022  2:52 PM Delpha Torrez A Add [M54 9] Back pain     6/28/2022  2:52 PM Delpha Torrez A Add [R10 9] Acute abdominal pain     6/28/2022  2:53 PM Delpha Torrez A Add [R51 9] Acute headache     6/28/2022  3:28 PM Delpha Torrez A Add [L16 221] Degenerative arthritis of lumbar spine     6/28/2022  3:28 PM Delpha Torrez A Add [K57 90] Diverticulosis     6/28/2022  3:28 PM Legare, Veria Campi A Add [R03 0] Elevated blood pressure reading     6/28/2022  3:29 PM Delpha Torrez A Add [N28 1] Renal cyst     6/28/2022  3:29 PM Delpha Torrez A Add [N20 0] Renal calculi     6/28/2022  5:49 PM Delpha Torrez Modify [J50 4,  Z99 2] ESRD on hemodialysis     6/28/2022  5:49 PM Delpha Torrez A Modify [R51 9] Acute headache       ED Disposition     ED Disposition   Discharge    Condition   Stable    Date/Time   Tue Jun 28, 2022  5:53 PM    Comment   Roxanna Mcardle discharge to home/self care                 Follow-up Information     Follow up With Specialties Details Why Contact Info    Andry Schroeder MD Internal Medicine Schedule an appointment as soon as possible for a visit   [address] [place], [place] [address]  [phone]            Patient's Medications   Discharge Prescriptions    No medications on file         PDMP Review       Value Time User    PDMP Reviewed  Yes 11/21/2021 1:00 PM Bhaskar Goff MD           ED Provider  Attending physically available and evaluated Cecilia Walsh  MANISH managed the patient along with the ED Attending      Electronically Signed by         Mariann Cornejo MD  06/28/22 2989

## 2022-06-28 NOTE — DISCHARGE INSTRUCTIONS
You were evaluated in the emergency department for: headache and back/abdominal pain  You can access your current and pending results through [facility]  A radiologist will take a second look at your X-Rays, if you had any, and you will be contacted with any new findings  You should follow-up with your primary care provider, as soon as possible, for re-evaluation  If you do not have a primary care provider, I have referred you to [address] [place] [address]  You will be contacted about scheduling an appointment  Their phone number is also included on this paperwork  You have also been referred to comprehensive spine  Your workup revealed no emergent features at this time; however, many disease processes are dynamic:    Please, return to the emergency department if you experience new or worsening symptoms, fever, chest pain, shortness of breath, difficulty breathing, dizziness, abdominal pain, persistent nausea/vomiting, syncope or passing out, blood in your urine or stool, coughing up blood, leg swelling/pain, urinary retention, bowel or bladder incontinence, numbness between your legs  Additionally, your blood pressure was measured to be high  This is something that you should discuss with your primary care provider and have re-checked within one week

## 2022-06-28 NOTE — TELEPHONE ENCOUNTER
Pt returned the nurses call  Pt is currently inpatient   Please be advised thank you    Pt can be reached @ [phone]

## 2022-06-28 NOTE — TELEPHONE ENCOUNTER
Unfortunately, medication options are quite limited secondary to co-morbidities  The patient could consider medical marijuana as an option and we can give her a list of certified medical marijuana prescribers in the area  Unsure if SCS would be an option considering anticoagulation  Would prefer to wait for results of EMG prior to considering any further interventional options

## 2022-06-28 NOTE — PLAN OF CARE
2 hours of hd tx completed  2k acid for 1st hour, then 1k acid for last hour  Tx well tolerated; no complications  Pt c/o right sided back pain and right sided headache that did not resolve during tx  Post-Dialysis RN Treatment Note    Blood Pressure:  Pre 156/93 mm/Hg  Post 136/102 mmHg   EDW  124 kg    Weight:  Pre 129 3 kg   Post 127 3 kg   Mode of weight measurement: Standing Scale   Volume Removed  2000 ml    Treatment duration 120 minutes    NS given  No    Treatment shortened?  No   Medications given during Rx na   Estimated Kt/V  na   Access type: AV fistula   Access Issues: No    Report called to primary nurse   Yes

## 2022-06-28 NOTE — CONSULTS
Consultation - Nephrology   Cecilia Walsh 56 y o  female MRN: [id number]  Unit/Bed#: CRISTINO Encounter: [id number]    ASSESSMENT/PLAN:   1  End-stage renal disease: On hemodialysis Tuesday, Thursday and Saturday at [address]  · Will dialyze today for 2 hours  2  Back pain and headache:  Workup per ER  3  Mineral bone disease of CKD:  On Sensipar 30 mg daily, Hectorol 1 mcg 3 times a day, Renvela powder 1 packet t i d  With meals  4  Anemia of CKD:  Not on MITCHELL given prior history of clots  5  Hypertension:  Blood pressure elevated but has not yet taken her blood pressure medications today  6  Access:  Left upper extremity AV fistula    HISTORY OF PRESENT ILLNESS:  Requesting Physician: Lucy Fish MD  Reason for Consult: ESRD on HD    Cecilia Walsh is a 56y o  year old female who was admitted to [facility] with back pain  Patient has had chronic back pain quite but recently has been worsening  She has been following with pain management and had recent spine injections with no improvement  She states it has been difficult to walk because of her back pain and also difficult to sit dialysis  Also having issues with right-sided headaches today  She has end-stage renal disease on hemodialysis Tuesday, Thursday and Saturday but did not attend her treatment today due to severe pain        PAST MEDICAL HISTORY:  Past Medical History:   Diagnosis Date    Abnormal uterine bleeding (AUB)     Anemia     Anxiety     Arthritis     Chronic kidney disease     Claustrophobia     Diabetes mellitus ([address] )     Disease of thyroid gland     DVT (deep venous thrombosis) (HCC)     End stage kidney disease (HCC)     Foot ulcer due to secondary DM ([address] )     Hemodialysis patient ([address] )     Tues, Thurs, Sat    Hypertension     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[address] )     right eye    Morbid obesity ([address] )     Osteomyelitis of fifth toe of right foot (HCC)     Panic attacks     Pulmonary embolism ([address] )     Reflux esophagitis     Thrombophlebitis of saphenous vein     Warfarin anticoagulation        PAST SURGICAL HISTORY:  Past Surgical History:   Procedure Laterality Date    AMPUTATION      r 4th toe    ARTERIOVENOUS GRAFT PLACEMENT      CATARACT EXTRACTION Bilateral     CERVICAL BIOPSY  W/ LOOP ELECTRODE EXCISION       SECTION      DIALYSIS FISTULA CREATION      DILATION AND CURETTAGE OF UTERUS      ENDOMETRIAL ABLATION W/ NOVASURE      EYE SURGERY      HYSTERECTOMY      @ age 57 (complete)    IR AV FISTULAGRAM/GRAFTOGRAM  10/11/2019    IR AV FISTULAGRAM/GRAFTOGRAM  2020    IR AV FISTULAGRAM/GRAFTOGRAM  2020    IR NON-TUNNELED CENTRAL LINE PLACEMENT  2021    IR NON-TUNNELED CENTRAL LINE PLACEMENT  10/4/2021    OOPHORECTOMY Bilateral     @ age 57    [facility] Right 2021    Procedure: AMPUTATION TRANSMETATARSAL (TMA);  Surgeon: Wilfredo Barnett DPM;  Location: [facility];  Service: Podiatry    [place] AMPUTATION TOE,MT-P JT Right 2020    Procedure: AMPUTATION TOE- 5th;  Surgeon: Wilfredo Barnett DPM;  Location: [facility];  Service: Podiatry    AZ COLONOSCOPY FLX DX W/COLLJ [id number] PFRMD N/A 3/13/2019    Procedure: COLONOSCOPY;  Surgeon: Jimmie Fonseca MD;  Location: [facility]; Service: Gastroenterology    AZ ESOPHAGOGASTRODUODENOSCOPY TRANSORAL DIAGNOSTIC N/A 3/13/2019    Procedure: ESOPHAGOGASTRODUODENOSCOPY (EGD); Surgeon: Jimmie Fonseca MD;  Location: [facility];   Service: Gastroenterology    [place] LAPAROSCOPY W TOT HYSTERECT UTERUS 250 GRAM OR LESS N/A 2016    Procedure: HYSTERECTOMY LAPAROSCOPIC TOTAL [facility]), with bilateral salpingectomy;  Surgeon: Garrison Mendes DO;  Location: [facility];  Service: Gynecology    THROMBECTOMY / ARTERIOVENOUS GRAFT REVISION      TOE SURGERY Right     removal of the 4th toe    WOUND DEBRIDEMENT Right 10/8/2021    Procedure: R 1st MTPJ washout ;  Surgeon: Carlita Arciniega NAT;  [facility];  Service: Podiatry       ALLERGIES:  Allergies   Allergen Reactions    Iodinated Diagnostic Agents Itching    Keflex [Cephalexin] Hives    Wound Dressing Adhesive Rash       SOCIAL HISTORY:  Social History     Substance and Sexual Activity   Alcohol Use Never    Alcohol/week: 0 0 standard drinks     Social History     Substance and Sexual Activity   Drug Use No     Social History     Tobacco Use   Smoking Status Never Smoker   Smokeless Tobacco Never Used       FAMILY HISTORY:  Family History   Problem Relation Age of Onset    Heart disease Family     Diabetes Family     Heart disease Mother     Cancer Brother         neck    Throat cancer Brother     Ovarian cancer Maternal Aunt 40       MEDICATIONS:  Scheduled Meds:  Current Facility-Administered Medications   Medication Dose Route Frequency Provider Last Rate    carvedilol  25 mg Oral Once Phu Cordon MD      lidocaine  1 patch Topical Once Phu Cordon MD      metoclopramide  10 mg Intravenous Once Phu Cordon MD         REVIEW OF SYSTEMS:  A complete review of systems was done  Pertinent positives and negatives noted in the HPI but otherwise the review of systems is negative      PHYSICAL EXAM:  Current Weight: Weight - Scale: 128 kg (282 lb 3 oz)  First Weight: Weight - Scale: 128 kg (282 lb 3 oz)  Vitals:    06/28/22 1301   BP: (!) 212/95   Pulse: 76   Resp: 18   Temp: (!) 97 4 °F (36 3 °C)   SpO2: 95%     General:  appears comfortable and in no acute distress   Skin:  No rash, warm, good skin turgor   Eyes:  Sclerae anicteric, mild periorbital edema   ENT:  Moist mucous membranes  Neck:  Trachea midline, symmetric   Chest:  Clear to auscultation bilaterally with no wheezes, rales or rhonchi  CVS:  Regular rate and rhythm  Abdomen:  Soft, nontender, nondistended  Neuro:  Awake and alert  Psych:  Appropriate affect  Extremities: no lower extremity edema       Lab Results:         Invalid input(s): ALBUMIN    Radiology Results:   CT head without contrast   Final Result by Josee Jackson MD (06/28 1432)      No acute intracranial abnormality  Stable left frontal extra-axial calcified meningioma measuring 12 x 11 mm              Workstation performed: [id number]         CT abdomen pelvis wo contrast    (Results Pending)       ;

## 2022-06-28 NOTE — ED ATTENDING ATTESTATION
6/28/2022  IFloyd MD, saw and evaluated the patient  I have discussed the patient with the resident/non-physician practitioner and agree with the resident's/non-physician practitioner's findings, Plan of Care, and MDM as documented in the resident's/non-physician practitioner's note, except where noted  All available labs and Radiology studies were reviewed  I was present for key portions of any procedure(s) performed by the resident/non-physician practitioner and I was immediately available to provide assistance  At this point I agree with the current assessment done in the Emergency Department  I have conducted an independent evaluation of this patient a history and physical is as follows:  Right-sided headache, new for the last couple days  Patient also with chronic longstanding right-sided back pain that radiates into her leg  No fevers or chills  No trauma  No syncope  Patient has end-stage renal disease, on dialysis, on Coumadin  Nonfocal exam here    Will plan to CT, treat symptoms, dialysis  ED Course         Critical Care Time  Procedures

## 2022-06-28 NOTE — PROCEDURES
RENAL HD NOTE   Mellissa Stein 56 y o  female MRN: [id number]  Unit/Bed#: CRISTINO Encounter: [id number]    The patient was seen and examined on hemodialysis  Time: 2 hours  Sodium: 138 Blood flow: 400   Dialyzer: F160 Potassium: 2/1 Dialysate flow: 500   Access: L arm AVF Bicarbonate: 35 Ultrafiltration goal: 2 kg   Medications on HD: none

## 2022-06-29 NOTE — TELEPHONE ENCOUNTER
S/w pt, gave her 3 locations outside of [place] [address] for her to call to schedule an EMG  Pt will call back with which facility she chooses so we can fax the order    ABRIL, ANGEL and W W  [employer] [facility]

## 2022-06-29 NOTE — TELEPHONE ENCOUNTER
S/w pt, gave her information below  Pt would like a list of medical marijuana doctors sent to her  Will mail  Pt said she is currently in bed and can not take down the name of the EMG facilities, asked for a call back

## 2022-06-30 ENCOUNTER — TELEPHONE (OUTPATIENT)
Dept: PHYSICAL THERAPY | Facility: OTHER | Age: 57
End: 2022-06-30

## 2022-06-30 NOTE — TELEPHONE ENCOUNTER
S/w pt, she still needs to call to schedule the EMG, pt will let me know which facility she chooses

## 2022-06-30 NOTE — TELEPHONE ENCOUNTER
Nurse reached out to discuss recent referral entered for  [facility] program and offerings  Patient was triaged by  [facility] on 6/10/22 for her chronic back pain  Nurse reviewed the triage and referral process with her  Patient has already established care with [facility] and has been seen  Patient also has referrals to PT spine in the EMR  Nurse provided the pt with the contact number to physical therapy site as she would like to discuss aquatherapy  Patient declined to participate in the program at this time  Nurse confirmed patient has [facility] contact information and encouraged to call program back if needed in the future  Patient agreed and very appreciative of call and discussion  Nurse wished her well and referral closed per protocol

## 2022-07-09 ENCOUNTER — APPOINTMENT (EMERGENCY)
Dept: RADIOLOGY | Facility: HOSPITAL | Age: 57
DRG: 177 | End: 2022-07-09
Payer: MEDICARE

## 2022-07-09 ENCOUNTER — HOSPITAL ENCOUNTER (INPATIENT)
Facility: HOSPITAL | Age: 57
LOS: 5 days | Discharge: HOME | DRG: 177 | End: 2022-07-14
Attending: SURGERY | Admitting: INTERNAL MEDICINE
Payer: MEDICARE

## 2022-07-09 DIAGNOSIS — M54.16 LUMBAR RADICULOPATHY: ICD-10-CM

## 2022-07-09 DIAGNOSIS — E11.9 TYPE 2 DIABETES MELLITUS (HCC): ICD-10-CM

## 2022-07-09 DIAGNOSIS — I26.99 OTHER PULMONARY EMBOLISM WITHOUT ACUTE COR PULMONALE, UNSPECIFIED CHRONICITY (HCC): ICD-10-CM

## 2022-07-09 DIAGNOSIS — N18.6 ESRD ON HEMODIALYSIS (HCC): ICD-10-CM

## 2022-07-09 DIAGNOSIS — Z99.2 ESRD ON HEMODIALYSIS (HCC): ICD-10-CM

## 2022-07-09 DIAGNOSIS — E11.42 DIABETIC POLYNEUROPATHY ASSOCIATED WITH TYPE 2 DIABETES MELLITUS (HCC): ICD-10-CM

## 2022-07-09 DIAGNOSIS — M54.40 LOW BACK PAIN WITH SCIATICA, SCIATICA LATERALITY UNSPECIFIED, UNSPECIFIED BACK PAIN LATERALITY, UNSPECIFIED CHRONICITY: ICD-10-CM

## 2022-07-09 DIAGNOSIS — U07.1 COVID-19: ICD-10-CM

## 2022-07-09 DIAGNOSIS — Z79.01 CHRONIC ANTICOAGULATION: ICD-10-CM
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
## 2022-07-09 DIAGNOSIS — Z78.9 PROBLEM WITH VASCULAR ACCESS: ICD-10-CM

## 2022-07-09 DIAGNOSIS — M46.46 DISCITIS OF LUMBAR REGION: ICD-10-CM

## 2022-07-09 PROBLEM — R09.02 HYPOXIA: Status: ACTIVE | Noted: 2022-07-09

## 2022-07-09 LAB
ABO GROUP BLD: NORMAL
ANION GAP SERPL CALCULATED.3IONS-SCNC: 6 MMOL/L (ref 4–13)
APTT PPP: 59 SECONDS (ref 23–37)
ATRIAL RATE: 85 BPM
BASE EXCESS BLDA CALC-SCNC: 8 MMOL/L (ref -2–3)
BASOPHILS # BLD AUTO: 0.02 THOUSANDS/ΜL (ref 0–0.1)
BASOPHILS NFR BLD AUTO: 0 % (ref 0–1)
BLD GP AB SCN SERPL QL: NEGATIVE
BUN SERPL-MCNC: 58 MG/DL (ref 5–25)
CA-I BLD-SCNC: 0.99 MMOL/L (ref 1.12–1.32)
CALCIUM SERPL-MCNC: 8.2 MG/DL (ref 8.3–10.1)
CARDIAC TROPONIN I PNL SERPL HS: 12 NG/L
CHLORIDE SERPL-SCNC: 99 MMOL/L (ref 100–108)
CO2 SERPL-SCNC: 30 MMOL/L (ref 21–32)
CREAT SERPL-MCNC: 7.44 MG/DL (ref 0.6–1.3)
EOSINOPHIL # BLD AUTO: 0.12 THOUSAND/ΜL (ref 0–0.61)
EOSINOPHIL NFR BLD AUTO: 2 % (ref 0–6)
ERYTHROCYTE [DISTWIDTH] IN BLOOD BY AUTOMATED COUNT: 13.1 % (ref 11.6–15.1)
FLUAV RNA RESP QL NAA+PROBE: NEGATIVE
FLUBV RNA RESP QL NAA+PROBE: NEGATIVE
GFR SERPL CREATININE-BSD FRML MDRD: 5 ML/MIN/1.73SQ M
GLUCOSE SERPL-MCNC: 261 MG/DL (ref 65–140)
GLUCOSE SERPL-MCNC: 297 MG/DL (ref 65–140)
GLUCOSE SERPL-MCNC: 319 MG/DL (ref 65–140)
HCO3 BLDA-SCNC: 32.1 MMOL/L (ref 24–30)
HCT VFR BLD AUTO: 27.1 % (ref 34.8–46.1)
HCT VFR BLD CALC: 25 % (ref 34.8–46.1)
HGB BLD-MCNC: 9 G/DL (ref 11.5–15.4)
HGB BLDA-MCNC: 8.5 G/DL (ref 11.5–15.4)
IMM GRANULOCYTES # BLD AUTO: 0.03 THOUSAND/UL (ref 0–0.2)
IMM GRANULOCYTES NFR BLD AUTO: 1 % (ref 0–2)
INR PPP: 3.66 (ref 0.84–1.19)
LYMPHOCYTES # BLD AUTO: 0.42 THOUSANDS/ΜL (ref 0.6–4.47)
LYMPHOCYTES NFR BLD AUTO: 7 % (ref 14–44)
MAGNESIUM SERPL-MCNC: 1.9 MG/DL (ref 1.6–2.6)
MCH RBC QN AUTO: 31.5 PG (ref 26.8–34.3)
MCHC RBC AUTO-ENTMCNC: 33.2 G/DL (ref 31.4–37.4)
MCV RBC AUTO: 95 FL (ref 82–98)
MONOCYTES # BLD AUTO: 0.74 THOUSAND/ΜL (ref 0.17–1.22)
MONOCYTES NFR BLD AUTO: 13 % (ref 4–12)
NEUTROPHILS # BLD AUTO: 4.5 THOUSANDS/ΜL (ref 1.85–7.62)
NEUTS SEG NFR BLD AUTO: 77 % (ref 43–75)
NRBC BLD AUTO-RTO: 0 /100 WBCS
P AXIS: 26 DEGREES
PCO2 BLD: 33 MMOL/L (ref 21–32)
PCO2 BLD: 39.8 MM HG (ref 42–50)
PH BLD: 7.51 [PH] (ref 7.3–7.4)
PHOSPHATE SERPL-MCNC: 5.3 MG/DL (ref 2.7–4.5)
PLATELET # BLD AUTO: 137 THOUSANDS/UL (ref 149–390)
PMV BLD AUTO: 11.4 FL (ref 8.9–12.7)
PO2 BLD: 70 MM HG (ref 35–45)
POTASSIUM BLD-SCNC: 5.8 MMOL/L (ref 3.5–5.3)
POTASSIUM SERPL-SCNC: 5.8 MMOL/L (ref 3.5–5.3)
PR INTERVAL: 186 MS
PROTHROMBIN TIME: 34.5 SECONDS (ref 11.6–14.5)
QRS AXIS: 45 DEGREES
QRSD INTERVAL: 96 MS
QT INTERVAL: 368 MS
QTC INTERVAL: 437 MS
RBC # BLD AUTO: 2.86 MILLION/UL (ref 3.81–5.12)
RH BLD: POSITIVE
RSV RNA RESP QL NAA+PROBE: NEGATIVE
SAO2 % BLD FROM PO2: 95 % (ref 60–85)
SARS-COV-2 RNA RESP QL NAA+PROBE: POSITIVE
SODIUM BLD-SCNC: 136 MMOL/L (ref 136–145)
SODIUM SERPL-SCNC: 135 MMOL/L (ref 136–145)
SPECIMEN EXPIRATION DATE: NORMAL
SPECIMEN SOURCE: ABNORMAL
T WAVE AXIS: 15 DEGREES
VENTRICULAR RATE: 85 BPM
WBC # BLD AUTO: 5.83 THOUSAND/UL (ref 4.31–10.16)

## 2022-07-09 PROCEDURE — 84484 ASSAY OF TROPONIN QUANT: CPT | Performed by: SURGERY

## 2022-07-09 PROCEDURE — 74176 CT ABD & PELVIS W/O CONTRAST: CPT

## 2022-07-09 PROCEDURE — 93010 ELECTROCARDIOGRAM REPORT: CPT | Performed by: INTERNAL MEDICINE

## 2022-07-09 PROCEDURE — 82550 ASSAY OF CK (CPK): CPT | Performed by: INTERNAL MEDICINE

## 2022-07-09 PROCEDURE — 84100 ASSAY OF PHOSPHORUS: CPT | Performed by: SURGERY

## 2022-07-09 PROCEDURE — 72125 CT NECK SPINE W/O DYE: CPT

## 2022-07-09 PROCEDURE — 70450 CT HEAD/BRAIN W/O DYE: CPT

## 2022-07-09 PROCEDURE — 86901 BLOOD TYPING SEROLOGIC RH(D): CPT | Performed by: SURGERY

## 2022-07-09 PROCEDURE — 84295 ASSAY OF SERUM SODIUM: CPT

## 2022-07-09 PROCEDURE — 84132 ASSAY OF SERUM POTASSIUM: CPT

## 2022-07-09 PROCEDURE — 86850 RBC ANTIBODY SCREEN: CPT | Performed by: SURGERY

## 2022-07-09 PROCEDURE — 82330 ASSAY OF CALCIUM: CPT

## 2022-07-09 PROCEDURE — NC001 PR NO CHARGE: Performed by: EMERGENCY MEDICINE

## 2022-07-09 PROCEDURE — 86140 C-REACTIVE PROTEIN: CPT | Performed by: INTERNAL MEDICINE

## 2022-07-09 PROCEDURE — 71250 CT THORAX DX C-: CPT

## 2022-07-09 PROCEDURE — 85014 HEMATOCRIT: CPT

## 2022-07-09 PROCEDURE — 82803 BLOOD GASES ANY COMBINATION: CPT

## 2022-07-09 PROCEDURE — 93005 ELECTROCARDIOGRAM TRACING: CPT

## 2022-07-09 PROCEDURE — 82947 ASSAY GLUCOSE BLOOD QUANT: CPT

## 2022-07-09 PROCEDURE — 99222 1ST HOSP IP/OBS MODERATE 55: CPT | Performed by: SURGERY

## 2022-07-09 PROCEDURE — 99223 1ST HOSP IP/OBS HIGH 75: CPT | Performed by: INTERNAL MEDICINE

## 2022-07-09 PROCEDURE — 86901 BLOOD TYPING SEROLOGIC RH(D): CPT | Performed by: INTERNAL MEDICINE

## 2022-07-09 PROCEDURE — 71045 X-RAY EXAM CHEST 1 VIEW: CPT

## 2022-07-09 PROCEDURE — 85025 COMPLETE CBC W/AUTO DIFF WBC: CPT | Performed by: SURGERY

## 2022-07-09 PROCEDURE — 86900 BLOOD TYPING SEROLOGIC ABO: CPT | Performed by: SURGERY

## 2022-07-09 PROCEDURE — 83735 ASSAY OF MAGNESIUM: CPT | Performed by: SURGERY

## 2022-07-09 PROCEDURE — 0241U HB NFCT DS VIR RESP RNA 4 TRGT: CPT | Performed by: EMERGENCY MEDICINE

## 2022-07-09 PROCEDURE — 85610 PROTHROMBIN TIME: CPT | Performed by: SURGERY

## 2022-07-09 PROCEDURE — 99285 EMERGENCY DEPT VISIT HI MDM: CPT

## 2022-07-09 PROCEDURE — 83036 HEMOGLOBIN GLYCOSYLATED A1C: CPT | Performed by: INTERNAL MEDICINE

## 2022-07-09 PROCEDURE — 93308 TTE F-UP OR LMTD: CPT | Performed by: SURGERY

## 2022-07-09 PROCEDURE — 36415 COLL VENOUS BLD VENIPUNCTURE: CPT | Performed by: SURGERY

## 2022-07-09 PROCEDURE — 76705 ECHO EXAM OF ABDOMEN: CPT | Performed by: SURGERY

## 2022-07-09 PROCEDURE — 80048 BASIC METABOLIC PNL TOTAL CA: CPT | Performed by: SURGERY

## 2022-07-09 PROCEDURE — 85379 FIBRIN DEGRADATION QUANT: CPT | Performed by: INTERNAL MEDICINE

## 2022-07-09 PROCEDURE — 85730 THROMBOPLASTIN TIME PARTIAL: CPT | Performed by: EMERGENCY MEDICINE

## 2022-07-09 PROCEDURE — 86900 BLOOD TYPING SEROLOGIC ABO: CPT | Performed by: INTERNAL MEDICINE

## 2022-07-09 PROCEDURE — 82948 REAGENT STRIP/BLOOD GLUCOSE: CPT

## 2022-07-09 RX ORDER — LIDOCAINE 50 MG/G
1 PATCH TOPICAL DAILY
Status: DISCONTINUED | OUTPATIENT
Start: 2022-07-10 | End: 2022-07-14 | Stop reason: HOSPADM

## 2022-07-09 RX ORDER — ZOLPIDEM TARTRATE 5 MG/1
5 TABLET ORAL
Status: DISCONTINUED | OUTPATIENT
Start: 2022-07-09 | End: 2022-07-14 | Stop reason: HOSPADM

## 2022-07-09 RX ORDER — CINACALCET 30 MG/1
30 TABLET, FILM COATED ORAL DAILY
Status: DISCONTINUED | OUTPATIENT
Start: 2022-07-10 | End: 2022-07-14 | Stop reason: HOSPADM

## 2022-07-09 RX ORDER — LANOLIN ALCOHOL/MO/W.PET/CERES
3 CREAM (GRAM) TOPICAL
Status: DISCONTINUED | OUTPATIENT
Start: 2022-07-09 | End: 2022-07-14 | Stop reason: HOSPADM

## 2022-07-09 RX ORDER — CHOLECALCIFEROL (VITAMIN D3) 10 MCG
1 TABLET ORAL
Status: DISCONTINUED | OUTPATIENT
Start: 2022-07-10 | End: 2022-07-14 | Stop reason: HOSPADM

## 2022-07-09 RX ORDER — POLYETHYLENE GLYCOL 3350 17 G/17G
17 POWDER, FOR SOLUTION ORAL DAILY
Status: DISCONTINUED | OUTPATIENT
Start: 2022-07-10 | End: 2022-07-14 | Stop reason: HOSPADM

## 2022-07-09 RX ORDER — ALBUTEROL SULFATE 90 UG/1
2 AEROSOL, METERED RESPIRATORY (INHALATION) EVERY 6 HOURS PRN
Status: DISCONTINUED | OUTPATIENT
Start: 2022-07-09 | End: 2022-07-14 | Stop reason: HOSPADM

## 2022-07-09 RX ORDER — MUSCLE RUB CREAM 100; 150 MG/G; MG/G
CREAM TOPICAL 4 TIMES DAILY PRN
Status: DISCONTINUED | OUTPATIENT
Start: 2022-07-09 | End: 2022-07-14 | Stop reason: HOSPADM

## 2022-07-09 RX ORDER — WARFARIN SODIUM 3 MG/1
9 TABLET ORAL
Status: DISCONTINUED | OUTPATIENT
Start: 2022-07-09 | End: 2022-07-09

## 2022-07-09 RX ORDER — TORSEMIDE 20 MG/1
100 TABLET ORAL DAILY
Status: DISCONTINUED | OUTPATIENT
Start: 2022-07-10 | End: 2022-07-14 | Stop reason: HOSPADM

## 2022-07-09 RX ORDER — METHOCARBAMOL 500 MG/1
500 TABLET, FILM COATED ORAL 2 TIMES DAILY
Status: DISCONTINUED | OUTPATIENT
Start: 2022-07-09 | End: 2022-07-14 | Stop reason: HOSPADM

## 2022-07-09 RX ORDER — ACETAMINOPHEN 325 MG/1
650 TABLET ORAL EVERY 6 HOURS PRN
Status: DISCONTINUED | OUTPATIENT
Start: 2022-07-09 | End: 2022-07-14 | Stop reason: HOSPADM

## 2022-07-09 RX ORDER — DEXAMETHASONE SODIUM PHOSPHATE 4 MG/ML
6 INJECTION, SOLUTION INTRA-ARTICULAR; INTRALESIONAL; INTRAMUSCULAR; INTRAVENOUS; SOFT TISSUE EVERY 24 HOURS
Status: DISCONTINUED | OUTPATIENT
Start: 2022-07-09 | End: 2022-07-11

## 2022-07-09 RX ORDER — CARVEDILOL 25 MG/1
25 TABLET ORAL 2 TIMES DAILY
Status: DISCONTINUED | OUTPATIENT
Start: 2022-07-09 | End: 2022-07-14 | Stop reason: HOSPADM

## 2022-07-09 RX ORDER — INSULIN LISPRO 100 [IU]/ML
2-12 INJECTION, SOLUTION INTRAVENOUS; SUBCUTANEOUS
Status: DISCONTINUED | OUTPATIENT
Start: 2022-07-10 | End: 2022-07-10

## 2022-07-09 RX ORDER — ONDANSETRON 2 MG/ML
4 INJECTION INTRAMUSCULAR; INTRAVENOUS EVERY 6 HOURS PRN
Status: DISCONTINUED | OUTPATIENT
Start: 2022-07-09 | End: 2022-07-14 | Stop reason: HOSPADM

## 2022-07-09 RX ORDER — INSULIN LISPRO 100 [IU]/ML
1-5 INJECTION, SOLUTION INTRAVENOUS; SUBCUTANEOUS
Status: DISCONTINUED | OUTPATIENT
Start: 2022-07-09 | End: 2022-07-10

## 2022-07-09 RX ORDER — SEVELAMER HYDROCHLORIDE 800 MG/1
1600 TABLET, FILM COATED ORAL
Status: DISCONTINUED | OUTPATIENT
Start: 2022-07-10 | End: 2022-07-10

## 2022-07-09 RX ORDER — SODIUM POLYSTYRENE SULFONATE 15 G/60ML
15 SUSPENSION ORAL; RECTAL AS NEEDED
Status: DISCONTINUED | OUTPATIENT
Start: 2022-07-09 | End: 2022-07-14 | Stop reason: HOSPADM

## 2022-07-09 RX ORDER — INSULIN GLARGINE 100 [IU]/ML
15 INJECTION, SOLUTION SUBCUTANEOUS
Status: DISCONTINUED | OUTPATIENT
Start: 2022-07-09 | End: 2022-07-10

## 2022-07-09 RX ORDER — ATORVASTATIN CALCIUM 20 MG/1
20 TABLET, FILM COATED ORAL EVERY EVENING
Status: DISCONTINUED | OUTPATIENT
Start: 2022-07-09 | End: 2022-07-14 | Stop reason: HOSPADM

## 2022-07-09 RX ORDER — LORATADINE 10 MG/1
10 TABLET ORAL DAILY
Status: DISCONTINUED | OUTPATIENT
Start: 2022-07-10 | End: 2022-07-14 | Stop reason: HOSPADM

## 2022-07-09 RX ORDER — PANTOPRAZOLE SODIUM 40 MG/1
40 TABLET, DELAYED RELEASE ORAL
Status: DISCONTINUED | OUTPATIENT
Start: 2022-07-10 | End: 2022-07-14 | Stop reason: HOSPADM

## 2022-07-09 RX ORDER — DICYCLOMINE HYDROCHLORIDE 10 MG/1
10 CAPSULE ORAL EVERY 6 HOURS PRN
Status: DISCONTINUED | OUTPATIENT
Start: 2022-07-09 | End: 2022-07-14 | Stop reason: HOSPADM

## 2022-07-09 RX ORDER — NIFEDIPINE 30 MG/1
30 TABLET, EXTENDED RELEASE ORAL DAILY
Status: DISCONTINUED | OUTPATIENT
Start: 2022-07-10 | End: 2022-07-14 | Stop reason: HOSPADM

## 2022-07-09 RX ORDER — NYSTATIN 100000 [USP'U]/G
POWDER TOPICAL 2 TIMES DAILY
Status: DISCONTINUED | OUTPATIENT
Start: 2022-07-09 | End: 2022-07-14 | Stop reason: HOSPADM

## 2022-07-09 RX ORDER — LEVOTHYROXINE SODIUM 0.05 MG/1
50 TABLET ORAL DAILY
Status: DISCONTINUED | OUTPATIENT
Start: 2022-07-10 | End: 2022-07-14 | Stop reason: HOSPADM

## 2022-07-09 RX ORDER — MAGNESIUM HYDROXIDE/ALUMINUM HYDROXICE/SIMETHICONE 120; 1200; 1200 MG/30ML; MG/30ML; MG/30ML
30 SUSPENSION ORAL EVERY 6 HOURS PRN
Status: DISCONTINUED | OUTPATIENT
Start: 2022-07-09 | End: 2022-07-14 | Stop reason: HOSPADM

## 2022-07-09 RX ORDER — ALPRAZOLAM 0.25 MG/1
0.25 TABLET ORAL 4 TIMES DAILY PRN
Status: DISCONTINUED | OUTPATIENT
Start: 2022-07-09 | End: 2022-07-14 | Stop reason: HOSPADM

## 2022-07-09 RX ORDER — DOCUSATE SODIUM 100 MG/1
100 CAPSULE, LIQUID FILLED ORAL 2 TIMES DAILY PRN
Status: DISCONTINUED | OUTPATIENT
Start: 2022-07-09 | End: 2022-07-14 | Stop reason: HOSPADM

## 2022-07-09 RX ADMIN — REMDESIVIR 200 MG: 100 INJECTION, POWDER, LYOPHILIZED, FOR SOLUTION INTRAVENOUS at 21:32

## 2022-07-09 RX ADMIN — ATORVASTATIN CALCIUM 20 MG: 20 TABLET, FILM COATED ORAL at 22:20

## 2022-07-09 RX ADMIN — INSULIN GLARGINE 15 UNITS: 100 INJECTION, SOLUTION SUBCUTANEOUS at 21:32

## 2022-07-09 RX ADMIN — ALPRAZOLAM 0.25 MG: 0.25 TABLET ORAL at 23:15

## 2022-07-09 RX ADMIN — ACETAMINOPHEN 650 MG: 325 TABLET, FILM COATED ORAL at 21:31

## 2022-07-09 RX ADMIN — DEXAMETHASONE SODIUM PHOSPHATE 6 MG: 4 INJECTION INTRA-ARTICULAR; INTRALESIONAL; INTRAMUSCULAR; INTRAVENOUS; SOFT TISSUE at 21:32

## 2022-07-09 RX ADMIN — METHOCARBAMOL 500 MG: 500 TABLET ORAL at 21:31

## 2022-07-09 RX ADMIN — ZOLPIDEM TARTRATE 5 MG: 5 TABLET, COATED ORAL at 21:31

## 2022-07-09 RX ADMIN — CARVEDILOL 25 MG: 25 TABLET, FILM COATED ORAL at 22:20

## 2022-07-09 RX ADMIN — Medication 3 MG: at 21:31

## 2022-07-09 NOTE — ASSESSMENT & PLAN NOTE
-Likely due to COVID as patient has had sick contacts  -Will order COVID testing   -Placed on 2L NC  -Titrate for SPO2 greater than 92%

## 2022-07-09 NOTE — ED PROVIDER NOTES
Emergency Department Airway Evaluation and Management Form    History  Obtained from: Patient   Review of patient's allergies indicates no known allergies  Patient end-stage renal disease  At dialysis today patient slipped and hit head  Noted to be on Coumadin so upgraded to trauma alert  Patient had close contact with COVID positive individual and was also noted to be hypoxic on arrival   Responded to 2 L nasal cannula  No loss of consciousness or head strike no other area of injury  Chief Complaint:  Trauma Alert    HPI: Pt is a 56 y o  female presents s/p fall on coumadin  I have reviewed and agree with the history as documented        Physical Exam    Vitals:    07/09/22 2010   BP: 115/67   Pulse: 84   Resp:    Temp:    SpO2: 96%     Supplemental Oxygen:2Lnc    GCS: 15    Neuro: Alert and oriented  Psych: not combative, not anxious, cooperative for exam  Neck: In collar, No JVD, No midline tenderness  Cardio:  Normal  Respiratory: Normal  Mouth:  Normal  Pharynx: Normal    Monitor:  NSR      ED Medications      Current Facility-Administered Medications:     acetaminophen (TYLENOL) tablet 650 mg, 650 mg, Oral, Q6H PRN, Evan Dillard MD    albuterol (PROVENTIL HFA,VENTOLIN HFA) inhaler 2 puff, 2 puff, Inhalation, Q6H PRN, Evan Dillard MD    ALPRAZolam Sasha Ready) tablet 0 25 mg, 0 25 mg, Oral, 4x Daily PRN, Evan Dillard MD    aluminum-magnesium hydroxide-simethicone (MYLANTA) oral suspension 30 mL, 30 mL, Oral, Q6H PRN, Evan Dillard MD    atorvastatin (LIPITOR) tablet 20 mg, 20 mg, Oral, QPM, MD Saul Pratt ON 7/10/2022] b complex-vitamin C-folic acid (NEPHROCAPS) capsule 1 capsule, 1 capsule, Oral, Daily With Dinner, Evan Dillard MD    carvedilol (COREG) tablet 25 mg, 25 mg, Oral, BID, MD Saul Pratt ON 7/10/2022] cinacalcet (SENSIPAR) tablet 30 mg, 30 mg, Oral, Daily, Evan Dillard MD    dexamethasone (DECADRON) injection 6 mg, 6 mg, Intravenous, Q24H, Sedrick Allen MD    dicyclomine (BENTYL) capsule 10 mg, 10 mg, Oral, Q6H PRN, Sedrick Allen MD    docusate sodium (COLACE) capsule 100 mg, 100 mg, Oral, BID PRN, Sedrick Allen MD    insulin glargine (LANTUS) subcutaneous injection 15 Units 0 15 mL, 15 Units, Subcutaneous, HS, Yefri Walker MD    insulin lispro (HumaLOG) 100 units/mL subcutaneous injection 1-5 Units, 1-5 Units, Subcutaneous, HS, Yefri Walker MD    [START ON 7/10/2022] insulin lispro (HumaLOG) 100 units/mL subcutaneous injection 2-12 Units, 2-12 Units, Subcutaneous, TID AC **AND** [START ON 7/10/2022] Fingerstick Glucose (POCT), , , TID AC, Sedrick Allen MD    [START ON 7/10/2022] levothyroxine tablet 50 mcg, 50 mcg, Oral, Daily, MD Kip Cardenas [facility]  [START ON 7/10/2022] lidocaine (LIDODERM) 5 % patch 1 patch, 1 patch, Topical, Daily, MD Kip Cardenas [facility] ON 7/10/2022] loratadine (CLARITIN) tablet 10 mg, 10 mg, Oral, Daily, Sedrick Allen MD    melatonin tablet 3 mg, 3 mg, Oral, HS, Yefri Walker MD    methocarbamol (ROBAXIN) tablet 500 mg, 500 mg, Oral, BID, MD Kip Cardenas [facility] ON 7/10/2022] NIFEdipine ER (ADALAT CC) 24 hr tablet 30 mg, 30 mg, Oral, Daily, Sedrick Allen MD    nystatin (MYCOSTATIN) powder, , Topical, BID, Sedrick Allen MD    ondansetron [facility]) injection 4 mg, 4 mg, Intravenous, Q6H PRN, MD Kip Cardenas [facility] ON 7/10/2022] pantoprazole (PROTONIX) EC tablet 40 mg, 40 mg, Oral, Early Morning, Sedrick Allen MD  Kip Santos Corn ON 7/10/2022] polyethylene glycol (MIRALAX) packet 17 g, 17 g, Oral, Daily, Sedrick Allen MD    remdesivir [facility]) 200 mg in sodium chloride 0 9 % 290 mL IVPB, 200 mg, Intravenous, Q24H **FOLLOWED BY** [START ON 7/10/2022] remdesivir (Veklury) 100 mg in sodium chloride 0 9 % 270 mL IVPB, 100 mg, Intravenous, Q24H, Yefri Walker, MD Gali Fink ON 7/10/2022] sertraline (ZOLOFT) tablet 75 mg, 75 mg, Oral, Daily, MD Loev Martins  [facility] ON 7/10/2022] sevelamer (RENAGEL) tablet 1,600 mg, 1,600 mg, Oral, TID With Meals, Roberth Bruce MD    sodium polystyrene sulfonate (KAYEXALATE) oral suspension 15 g, 15 g, Oral, PRN, [facility] 7/10/2022] torsemide (DEMADEX) tablet 100 mg, 100 mg, Oral, Daily, Roberth Bruce MD    warfarin (COUMADIN) tablet 9 mg, 9 mg, Oral, Daily (warfarin), Roberth Bruce MD    zolpidem (AMBIEN) tablet 5 mg, 5 mg, Oral, HS, Roberth Bruce MD    Current Outpatient Medications:     Accu-Chek Guide test strip, use to TEST BLOOD SUGAR two to three times a day, Disp: , Rfl:     Accu-Chek Softclix Lancets lancets, 3 (three) times a day Use to test blood sugar, Disp: , Rfl:     acetaminophen (TYLENOL) 325 mg tablet, Take 2 tablets (650 mg total) by mouth every 6 (six) hours as needed for mild pain or fever (Patient not taking: No sig reported), Disp: 30 tablet, Rfl: 0    albuterol (ProAir HFA) 90 mcg/act inhaler, Inhale 2 puffs every 6 (six) hours as needed for wheezing or shortness of breath, Disp: 8 5 g, Rfl: 0    ALPRAZolam (XANAX) 0 25 mg tablet, Take 0 25 mg by mouth 2 (two) times a day as needed for anxiety, Disp: , Rfl:     ALPRAZolam (XANAX) 0 5 mg tablet, TAKE 2 AND 1/2 TABLETS DAILY IN DIVIDED DOSES AS DIRECTED , Disp: , Rfl:     ammonium lactate (LAC-HYDRIN) 12 % cream, Apply topically 2 (two) times a day, Disp: 385 g, Rfl: 0    atorvastatin (LIPITOR) 20 mg tablet, Take 20 mg by mouth every evening , Disp: , Rfl: 0    B Complex-C-Folic Acid (Natalia-Denys) TABS, TAKE ONE TABLET BY MOUTH DAILY, Disp: 90 tablet, Rfl: 3    carvedilol (COREG) 25 mg tablet, TAKE ONE TABLET BY MOUTH TWICE A DAY, Disp: 180 tablet, Rfl: 7    cinacalcet (SENSIPAR) 30 mg tablet, Take 30 mg by mouth daily, Disp: , Rfl:     dicyclomine (BENTYL) 10 mg capsule, TAKE 1 TABLET BY MOUTH EVERY 6 HOURS OR AS NEEDED FOR PAIN, Disp: , Rfl:     insulin glargine (Lantus SoloStar) 100 units/mL injection pen, Inject 15 Units under the skin daily at bedtime, Disp: 15 mL, Rfl: 0    KIONEX 15 GM/60ML suspension, Take by mouth Takes as a shake on dialysis days Tues-Thurs_Sat , Disp: , Rfl: 0    levothyroxine 50 mcg tablet, Take 50 mcg by mouth daily, Disp: , Rfl:     lidocaine (LIDODERM) 5 %, , Disp: , Rfl:     loratadine (CLARITIN) 10 mg tablet, Take 10 mg by mouth daily, Disp: , Rfl:     melatonin 3 mg, Take 1 tablet (3 mg total) by mouth daily at bedtime, Disp: 30 tablet, Rfl: 0    methocarbamol (ROBAXIN) 500 mg tablet, Take 1 tablet (500 mg total) by mouth 2 (two) times a day, Disp: 20 tablet, Rfl: 0    metoclopramide (REGLAN) 10 mg/10 mL oral solution, Take 5 mL (5 mg total) by mouth every 6 (six) hours as needed (nausea), Disp: 120 mL, Rfl: 1    NIFEdipine (ADALAT CC) 30 MG 24 hr tablet, Take 1 tablet (30 mg total) by mouth daily, Disp: 30 tablet, Rfl: 0    NOVOLOG FLEXPEN 100 units/mL SOPN, INJECT 1 TO 5 UNITS SUBCUTANEOUSLY THREE TIMES A DAY BEFORE MELAS AS PER SLIDING SCALE, Disp: , Rfl:     nystatin (MYCOSTATIN) powder, Apply topically 2 (two) times a day, Disp: 15 g, Rfl: 0    ondansetron (ZOFRAN-ODT) 8 mg disintegrating tablet, , Disp: , Rfl:     oxyCODONE (ROXICODONE) 5 immediate release tablet, Take 1 tablet (5 mg total) by mouth every 4 (four) hours as needed for severe pain Max Daily Amount: 30 mg (Patient not taking: No sig reported), Disp: 15 tablet, Rfl: 0    pantoprazole (PROTONIX) 40 mg tablet, take 1 tablet by mouth twice a day, Disp: , Rfl:     polyethylene glycol (MIRALAX) 17 g packet, Take 17 g by mouth daily for 7 days, Disp: 119 g, Rfl: 0    polyethylene glycol (MIRALAX) 17 g packet, Take 17 g by mouth daily, Disp: 510 g, Rfl: 5    pregabalin (LYRICA) 50 mg capsule, Take 1 PO daily, take 1 PO additionally directly have HD on HD days, Disp: 45 capsule, Rfl: 2   senna (SENOKOT) 8 6 mg, Take 2 tablets (17 2 mg total) by mouth daily at bedtime as needed for constipation, Disp: 30 each, Rfl: 0    sertraline (ZOLOFT) 50 mg tablet, Take 1 tablet (50 mg total) by mouth daily (Patient taking differently: Take 75 mg by mouth in the morning ), Disp: 30 tablet, Rfl: 0    sevelamer (RENAGEL) 800 mg tablet, Take 2 tablets (1,600 mg total) by mouth 3 (three) times a day with meals, Disp: 30 tablet, Rfl: 0    torsemide (DEMADEX) 100 mg tablet, Take 1 tablet (100 mg total) by mouth 4 (four) times a week On Sunday, Monday, Wednesday, Friday (Patient taking differently: Take 100 mg by mouth 4 (four) times a week On Sunday, Monday, Wednesday, Friday (pt states she takes it everyday)), Disp: , Rfl: 0    urea (CARMOL) 40 %, APPLY TO AFFECTED AREA TOPICALLY EVERY DAY, Disp: 85 g, Rfl: 2    warfarin (COUMADIN) 3 mg tablet, Take 3 tablets (9 mg total) by mouth daily Takes 9 mg Monday Sat, Disp: 10 tablet, Rfl: 0    zolpidem (AMBIEN) 5 mg tablet, Take 5 mg by mouth daily at bedtime, Disp: , Rfl:       Intubation    No intubation required    Final Diagnosis:  Fall on Coumadin  Hypoxia possibly due to COVID  Airway patent no indication for advanced airway  Will be kept on supplemental O2 via nasal cannula      ED Provider  Electronically Signed by       Olive Lozano DO  07/09/22 2041

## 2022-07-09 NOTE — PROCEDURES
POC FAST US    Date/Time: 7/9/2022 7:47 PM  Performed by: Hope Falk MD  Authorized by:  Hope Falk MD     Patient location:  Trauma  Other Assisting Provider: No    Procedure details:     Exam Type:  Diagnostic    Indications: blunt abdominal trauma      Assess for:  Hemothorax, intra-abdominal fluid, pericardial effusion and pneumothorax    Technique: FAST      Views obtained:  Heart - Pericardial sac, RUQ - Camacho's Pouch, LUQ - Splenorenal space and Suprapubic - Pouch of Devendra    Image quality: limited diagnostic      Image quality comment:  Limited due to body habitus    Image availability:  Video obtained and images available in PACS  FAST Findings:     RUQ (Hepatorenal) free fluid: absent      LUQ (Splenorenal) free fluid: absent      Suprapubic free fluid: absent      Cardiac wall motion: identified      Pericardial effusion: absent    Interpretation:     Impressions: negative

## 2022-07-09 NOTE — H&P
[address] [place] [address]  H&P- Dameon Romero 1965, Delma Caputo y o  female MRN: [id number]  Unit/Bed#: ED 22 Encounter: [id number]  Primary Care Provider: Amanda Hoskins MD   Date and time admitted to hospital: 7/9/2022  6:33 PM    Hypoxia  Assessment & Plan  -Likely due to [place] as patient has had sick contacts  -Will order COVID testing   -Placed on 2L NC  -Titrate for SPO2 greater than 92%    Tompa U  68  while getting to dialysis  -PT/OT  Chronic anticoagulation  Assessment & Plan  On warfarin  Will CHeck Coag panel  ESRD on hemodialysis  Assessment & Plan  Lab Results   Component Value Date    EGFR 3 06/28/2022    EGFR 5 01/12/2022    EGFR 8 01/09/2022    CREATININE 11 00 (H) 06/28/2022    CREATININE 7 64 (H) 01/12/2022    CREATININE 5 47 (H) 01/09/2022       Patient gets dialysis T, TH, S      H&P - Trauma   Dameon Caputo y adarsh  female MRN: [id number]  Unit/Bed#: ED 22 Encounter: [id number]    Trauma Alert: Level B   Model of Arrival: Self    Trauma Team: Attending Marnie Ybarra, Residents Francoise Mejia and Fellow Sami  Consultants:     None     Assessment/Plan   Active Problems / Assessment:   Hypoxia  Fall from standing  On Coumadin  Complaints of back pain and chest pain     Plan:   Cardiac workup  COVID swab  Cardiac workup  CT head and C-spine  CT CAP  Admit to medicine if trauma scans negative  History of Present Illness     Chief Complaint: Chest pain  Mechanism:Fall     HPI:    Dameon Romero is a [address] y o  female with a history of ESRD on dialysis Tuesday Thursday Saturday, diabetes, chronic anticoagulation on warfarin who presents with fall from standing  Patient initially came in by self to the ED complaining of chest pain and concern for COVID  Patient has had multiple COVID sick contacts at home  She stated to ED staff that she had a fall this morning while she is on warfarin on her way to her dialysis session    She only did about 2 hours of her dialysis session because she started to feel sick  She has pain in her back however this is a chronic problem for her  Review of Systems   Constitutional: Positive for fever  HENT: Negative for congestion  Eyes: Negative for visual disturbance  Respiratory: Negative for shortness of breath  Cardiovascular: Positive for chest pain  Gastrointestinal: Negative for abdominal pain  Genitourinary: Negative for dysuria  Musculoskeletal: Positive for back pain  Skin: Negative for rash  Neurological: Negative for headaches  Psychiatric/Behavioral: Negative for agitation  All other systems reviewed and are negative  12-point, complete review of systems was reviewed and negative except as stated above       Historical Information     Past Medical History:   Diagnosis Date    Abnormal uterine bleeding (AUB)     Anemia     Anxiety     Arthritis     Chronic kidney disease     Claustrophobia     Diabetes mellitus ([facility]     Disease of thyroid gland     DVT (deep venous thrombosis) ([facility])     End stage kidney disease (HCC)     Foot ulcer due to secondary DM ([facility] )     [facility]     Tues, Thurs, Sat    Hypertension     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[facility] )     right eye    Morbid obesity ([facility]     Osteomyelitis of fifth toe of right foot (HCC)     Panic attacks     Pulmonary embolism (HCC)     Reflux esophagitis     Thrombophlebitis of saphenous vein     Warfarin anticoagulation      Past Surgical History:   Procedure Laterality Date    AMPUTATION      r 4th toe    ARTERIOVENOUS GRAFT PLACEMENT      CATARACT EXTRACTION Bilateral     CERVICAL BIOPSY  W/ LOOP ELECTRODE EXCISION       SECTION      DIALYSIS FISTULA CREATION      DILATION AND CURETTAGE OF UTERUS      ENDOMETRIAL ABLATION W/ NOVASURE      EYE SURGERY      HYSTERECTOMY      @ age 57 (complete)    IR AV FISTULAGRAM/GRAFTOGRAM  10/11/2019    IR AV FISTULAGRAM/GRAFTOGRAM  2020    IR AV FISTULAGRAM/GRAFTOGRAM  12/23/2020    IR NON-TUNNELED CENTRAL LINE PLACEMENT  6/29/2021    IR NON-TUNNELED CENTRAL LINE PLACEMENT  10/4/2021    OOPHORECTOMY Bilateral     @ age 57    [address] Right 12/26/2021    Procedure: AMPUTATION TRANSMETATARSAL (TMA);  Surgeon: Shavon Ackerman DPM;  [facility];  Service: Podiatry    DE AMPUTATION TOE,MT-P JT Right 5/28/2020    Procedure: AMPUTATION TOE- 5th;  Surgeon: Shavon Ackerman DPM;  [facility];  Service: Podiatry    DE COLONOSCOPY FLX DX W/COLLJ [address] PFRMD N/A 3/13/2019    Procedure: COLONOSCOPY;  Surgeon: Serafin Sands MD;  Location: [facility]; Service: Gastroenterology    DE ESOPHAGOGASTRODUODENOSCOPY TRANSORAL DIAGNOSTIC N/A 3/13/2019    Procedure: ESOPHAGOGASTRODUODENOSCOPY (EGD); Surgeon: Serafin Sands MD;  [facility];   Service: Gastroenterology    DE LAPAROSCOPY W TOT HYSTERECT UTERUS 250 GRAM OR LESS N/A 2/16/2016    Procedure: HYSTERECTOMY LAPAROSCOPIC TOTAL ([address]), with bilateral salpingectomy;  Surgeon: Kimberley Florentino DO;  [facility];  Service: Gynecology    THROMBECTOMY / ARTERIOVENOUS GRAFT REVISION      TOE SURGERY Right     removal of the 4th toe    WOUND DEBRIDEMENT Right 10/8/2021    Procedure: R 1st MTPJ washout ;  Surgeon: Mamie Kwan DPM;  [facility];  Service: Podiatry        Social History     Tobacco Use    Smoking status: Never Smoker    Smokeless tobacco: Never Used   Vaping Use    Vaping Use: Never used   Substance Use Topics    Alcohol use: Never     Alcohol/week: 0 0 standard drinks    Drug use: No     Immunization History   Administered Date(s) Administered    COVID-19 PFIZER VACCINE 0 3 ML IM 04/01/2021, 04/22/2021    INFLUENZA 09/11/2014, 09/13/2016, 10/09/2018, 10/10/2018, 10/08/2020, 10/08/2020    Influenza Quadrivalent 3 years and older 08/30/2017, 09/01/2017, 10/10/2018, 10/29/2019    Influenza, seasonal, injectable, preservative free 10/15/2013, 10/12/2015    Pneumococcal Conjugate 13-Valent 10/31/2019    Pneumococcal Polysaccharide PPV23 01/01/2014, 02/22/2014, 04/21/2020    Tuberculin Skin Test-PPD Intradermal 08/06/2015, 11/19/2016, 07/10/2018, 07/11/2019, 07/07/2020     Last Tetanus: not indicated at this time  Family History: Non-contributory     Meds/Allergies   all current active meds have been reviewed     Allergies   Allergen Reactions    Iodinated Diagnostic Agents Itching    Keflex [Cephalexin] Hives    Wound Dressing Adhesive Rash       Objective   Initial Vitals:   Temperature: (!) 100 6 °F (38 1 °C) (07/09/22 1854)  Pulse: 85 (07/09/22 1854)  Respirations: 20 (07/09/22 1919)  Blood Pressure: 107/55 (07/09/22 1854)    Primary Survey:   Airway:        Status: patent;        Pre-hospital Interventions: none        Hospital Interventions: none  Breathing:        Pre-hospital Interventions: none       Effort: normal       Right breath sounds: normal       Left breath sounds: normal  Circulation:        Rhythm: regular       Rate: regular   Right Pulses Left Pulses    R radial: 2+    R pedal: 2+     L radial: 2+    L pedal: 2+       Disability:        GCS: Eye: 4; Verbal: 5 Motor: 6 Total: 15       Right Pupil: 3 mm;  round;  reactive         Left Pupil:  3 mm;  round;  reactive      R Motor Strength L Motor Strength    R : 5/5  R dorsiflex: 5/5  R plantarflex: 5/5 L : 5/5  L dorsiflex: 5/5  L plantarflex: 5/5        Sensory:  No sensory deficit  Exposure:       Completed: Yes      Secondary Survey:  Physical Exam  Vitals and nursing note reviewed  Constitutional:       Appearance: She is obese  HENT:      Head: Normocephalic and atraumatic  Right Ear: Tympanic membrane, ear canal and external ear normal       Left Ear: Tympanic membrane, ear canal and external ear normal       Nose: Nose normal  No congestion        Mouth/Throat:      Mouth: Mucous membranes are moist       Pharynx: Oropharynx is clear  No oropharyngeal exudate  Eyes:      General:         Right eye: No discharge  Left eye: No discharge  Extraocular Movements: Extraocular movements intact  Conjunctiva/sclera: Conjunctivae normal       Pupils: Pupils are equal, round, and reactive to light  Cardiovascular:      Rate and Rhythm: Normal rate and regular rhythm  Pulses: Normal pulses  Heart sounds: Normal heart sounds  No murmur heard  Pulmonary:      Effort: Pulmonary effort is normal  No respiratory distress  Breath sounds: Normal breath sounds  Comments: Placed on NC  Abdominal:      General: Abdomen is flat  Bowel sounds are normal  There is no distension  Palpations: Abdomen is soft  Tenderness: There is no abdominal tenderness  Musculoskeletal:         General: No swelling or tenderness  Normal range of motion  Cervical back: Normal range of motion  No rigidity  Comments: Amputation at the level of the right metatarsal     Patient complaining of C, T, L spine tenderness  Lymphadenopathy:      Cervical: No cervical adenopathy  Skin:     General: Skin is warm and dry  Capillary Refill: Capillary refill takes less than 2 seconds  Neurological:      General: No focal deficit present  Mental Status: She is alert and oriented to person, place, and time  Mental status is at baseline  Cranial Nerves: No cranial nerve deficit  Motor: No weakness  Psychiatric:         Mood and Affect: Mood normal          Behavior: Behavior normal      Cervical Collar Clearance: The patient had a CT scan of the cervical spine demonstrating no acute injury  On exam, the patient had no midline point tenderness or paresthesias/numbness/weakness in the extremities  The patient had full range of motion (was then able to flex, extend, and rotate head laterally) without pain  There were no distracting injuries and the patient was not intoxicated        The patient's cervical spine was cleared radiologically and clinically  Cervical collar removed at this time  Baldo Crawford MD  7/9/2022 8:21 PM      Invasive Devices  Report    Line  Duration           Hemodialysis AV Fistula 02/20/18 Left Forearm 1600 days              Lab Results:   BMP/CMP:   Lab Results   Component Value Date    CO2 33 (H) 07/09/2022    GLUCOSE 319 (H) 07/09/2022    and CBC:   Lab Results   Component Value Date    WBC 5 83 07/09/2022    HGB 9 0 (L) 07/09/2022    HCT 27 1 (L) 07/09/2022    MCV 95 07/09/2022     (L) 07/09/2022    MCH 31 5 07/09/2022    MCHC 33 2 07/09/2022    RDW 13 1 07/09/2022    MPV 11 4 07/09/2022    NRBC 0 07/09/2022       Imaging Results: I have personally reviewed pertinent reports  Chest Xray(s): negative for acute findings   FAST exam(s): negative for acute findings   CT Scan(s): negative for acute findings   Additional Xray(s): N/A       Code Status: Prior  Advance Directive and Living Will:      Power of :    POLST:    I have spent 30 minutes with Patient  today in which greater than 50% of this time was spent in counseling/coordination of care regarding Diagnostic results, Impressions and coordination of care

## 2022-07-09 NOTE — ED PROVIDER NOTES
Initially, patient was roomed in the emergency department  During acquisition of history, patient stated she fell and hit her head this morning while going to dialysis  No loss of consciousness  As she is anticoagulated with warfarin, patient was upgraded to a level be trauma  Patient noted to be hypoxic to 87 SpO2 on room air - this responded quickly to 2 L via nasal cannula  ED Provider  Attending physically available and evaluated Aubree French  I managed the patient along with the ED Attending      Electronically Signed by         Venus Aguirre MD  07/09/22 1400

## 2022-07-09 NOTE — ASSESSMENT & PLAN NOTE
Lab Results   Component Value Date    EGFR 3 06/28/2022    EGFR 5 01/12/2022    EGFR 8 01/09/2022    CREATININE 11 00 (H) 06/28/2022    CREATININE 7 64 (H) 01/12/2022    CREATININE 5 47 (H) 01/09/2022       Patient gets dialysis T, TH, S

## 2022-07-10 LAB
ABO GROUP BLD: NORMAL
ALBUMIN SERPL BCP-MCNC: 2.9 G/DL (ref 3.5–5)
ALP SERPL-CCNC: 123 U/L (ref 46–116)
ALT SERPL W P-5'-P-CCNC: 18 U/L (ref 12–78)
ANION GAP SERPL CALCULATED.3IONS-SCNC: 9 MMOL/L (ref 4–13)
AST SERPL W P-5'-P-CCNC: 13 U/L (ref 5–45)
BASOPHILS # BLD AUTO: 0.01 THOUSANDS/ΜL (ref 0–0.1)
BASOPHILS NFR BLD AUTO: 0 % (ref 0–1)
BILIRUB SERPL-MCNC: 0.35 MG/DL (ref 0.2–1)
BUN SERPL-MCNC: 68 MG/DL (ref 5–25)
CALCIUM ALBUM COR SERPL-MCNC: 9.3 MG/DL (ref 8.3–10.1)
CALCIUM SERPL-MCNC: 8.4 MG/DL (ref 8.3–10.1)
CHLORIDE SERPL-SCNC: 101 MMOL/L (ref 100–108)
CK SERPL-CCNC: 39 U/L (ref 26–192)
CO2 SERPL-SCNC: 27 MMOL/L (ref 21–32)
CREAT SERPL-MCNC: 8.83 MG/DL (ref 0.6–1.3)
CRP SERPL QL: 42 MG/L
CRP SERPL QL: 45.2 MG/L
D DIMER PPP FEU-MCNC: 1.02 UG/ML FEU
EOSINOPHIL # BLD AUTO: 0 THOUSAND/ΜL (ref 0–0.61)
EOSINOPHIL NFR BLD AUTO: 0 % (ref 0–6)
ERYTHROCYTE [DISTWIDTH] IN BLOOD BY AUTOMATED COUNT: 13.2 % (ref 11.6–15.1)
EST. AVERAGE GLUCOSE BLD GHB EST-MCNC: 223 MG/DL
GFR SERPL CREATININE-BSD FRML MDRD: 4 ML/MIN/1.73SQ M
GLUCOSE SERPL-MCNC: 148 MG/DL (ref 65–140)
GLUCOSE SERPL-MCNC: 296 MG/DL (ref 65–140)
GLUCOSE SERPL-MCNC: 490 MG/DL (ref 65–140)
GLUCOSE SERPL-MCNC: 51 MG/DL (ref 65–140)
GLUCOSE SERPL-MCNC: 93 MG/DL (ref 65–140)
HBA1C MFR BLD: 9.4 %
HCT VFR BLD AUTO: 26.3 % (ref 34.8–46.1)
HGB BLD-MCNC: 8.6 G/DL (ref 11.5–15.4)
IMM GRANULOCYTES # BLD AUTO: 0.04 THOUSAND/UL (ref 0–0.2)
IMM GRANULOCYTES NFR BLD AUTO: 1 % (ref 0–2)
INR PPP: 2.64 (ref 0.84–1.19)
LYMPHOCYTES # BLD AUTO: 0.75 THOUSANDS/ΜL (ref 0.6–4.47)
LYMPHOCYTES NFR BLD AUTO: 14 % (ref 14–44)
MCH RBC QN AUTO: 31.7 PG (ref 26.8–34.3)
MCHC RBC AUTO-ENTMCNC: 32.7 G/DL (ref 31.4–37.4)
MCV RBC AUTO: 97 FL (ref 82–98)
MONOCYTES # BLD AUTO: 0.68 THOUSAND/ΜL (ref 0.17–1.22)
MONOCYTES NFR BLD AUTO: 13 % (ref 4–12)
NEUTROPHILS # BLD AUTO: 3.79 THOUSANDS/ΜL (ref 1.85–7.62)
NEUTS SEG NFR BLD AUTO: 72 % (ref 43–75)
NRBC BLD AUTO-RTO: 0 /100 WBCS
NT-PROBNP SERPL-MCNC: ABNORMAL PG/ML
PLATELET # BLD AUTO: 149 THOUSANDS/UL (ref 149–390)
PMV BLD AUTO: 10.7 FL (ref 8.9–12.7)
POTASSIUM SERPL-SCNC: 5.5 MMOL/L (ref 3.5–5.3)
PROT SERPL-MCNC: 6.7 G/DL (ref 6.4–8.2)
PROTHROMBIN TIME: 26.9 SECONDS (ref 11.6–14.5)
RBC # BLD AUTO: 2.71 MILLION/UL (ref 3.81–5.12)
RH BLD: POSITIVE
SODIUM SERPL-SCNC: 137 MMOL/L (ref 136–145)
WBC # BLD AUTO: 5.27 THOUSAND/UL (ref 4.31–10.16)

## 2022-07-10 PROCEDURE — 85025 COMPLETE CBC W/AUTO DIFF WBC: CPT | Performed by: NURSE PRACTITIONER

## 2022-07-10 PROCEDURE — 99232 SBSQ HOSP IP/OBS MODERATE 35: CPT | Performed by: NURSE PRACTITIONER

## 2022-07-10 PROCEDURE — 86140 C-REACTIVE PROTEIN: CPT | Performed by: NURSE PRACTITIONER

## 2022-07-10 PROCEDURE — 80053 COMPREHEN METABOLIC PANEL: CPT | Performed by: NURSE PRACTITIONER

## 2022-07-10 PROCEDURE — 83880 ASSAY OF NATRIURETIC PEPTIDE: CPT | Performed by: NURSE PRACTITIONER

## 2022-07-10 PROCEDURE — 85610 PROTHROMBIN TIME: CPT | Performed by: NURSE PRACTITIONER

## 2022-07-10 PROCEDURE — 5A1D70Z PERFORMANCE OF URINARY FILTRATION, INTERMITTENT, LESS THAN 6 HOURS PER DAY: ICD-10-PCS | Performed by: INTERNAL MEDICINE

## 2022-07-10 PROCEDURE — 99222 1ST HOSP IP/OBS MODERATE 55: CPT | Performed by: INTERNAL MEDICINE

## 2022-07-10 PROCEDURE — 82948 REAGENT STRIP/BLOOD GLUCOSE: CPT

## 2022-07-10 RX ORDER — INSULIN LISPRO 100 [IU]/ML
2-12 INJECTION, SOLUTION INTRAVENOUS; SUBCUTANEOUS
Status: DISCONTINUED | OUTPATIENT
Start: 2022-07-10 | End: 2022-07-11

## 2022-07-10 RX ORDER — WARFARIN SODIUM 3 MG/1
3 TABLET ORAL
Status: DISCONTINUED | OUTPATIENT
Start: 2022-07-10 | End: 2022-07-11

## 2022-07-10 RX ORDER — INSULIN LISPRO 100 [IU]/ML
1-6 INJECTION, SOLUTION INTRAVENOUS; SUBCUTANEOUS
Status: DISCONTINUED | OUTPATIENT
Start: 2022-07-11 | End: 2022-07-11

## 2022-07-10 RX ORDER — LOPERAMIDE HYDROCHLORIDE 2 MG/1
2 CAPSULE ORAL ONCE
Status: COMPLETED | OUTPATIENT
Start: 2022-07-10 | End: 2022-07-10

## 2022-07-10 RX ORDER — SEVELAMER HYDROCHLORIDE 800 MG/1
2400 TABLET, FILM COATED ORAL
Status: DISCONTINUED | OUTPATIENT
Start: 2022-07-10 | End: 2022-07-14 | Stop reason: HOSPADM

## 2022-07-10 RX ORDER — INSULIN LISPRO 100 [IU]/ML
2-12 INJECTION, SOLUTION INTRAVENOUS; SUBCUTANEOUS
Status: DISCONTINUED | OUTPATIENT
Start: 2022-07-10 | End: 2022-07-10

## 2022-07-10 RX ORDER — MELATONIN
2000 DAILY
Status: DISCONTINUED | OUTPATIENT
Start: 2022-07-10 | End: 2022-07-14 | Stop reason: HOSPADM

## 2022-07-10 RX ORDER — INSULIN GLARGINE 100 [IU]/ML
10 INJECTION, SOLUTION SUBCUTANEOUS
Status: DISCONTINUED | OUTPATIENT
Start: 2022-07-10 | End: 2022-07-11

## 2022-07-10 RX ORDER — CALCITRIOL 0.25 UG/1
0.25 CAPSULE, LIQUID FILLED ORAL DAILY
Status: DISCONTINUED | OUTPATIENT
Start: 2022-07-10 | End: 2022-07-14 | Stop reason: HOSPADM

## 2022-07-10 RX ADMIN — CARVEDILOL 25 MG: 25 TABLET, FILM COATED ORAL at 17:42

## 2022-07-10 RX ADMIN — REMDESIVIR 100 MG: 100 INJECTION, POWDER, LYOPHILIZED, FOR SOLUTION INTRAVENOUS at 19:40

## 2022-07-10 RX ADMIN — LORATADINE 10 MG: 10 TABLET ORAL at 08:17

## 2022-07-10 RX ADMIN — POLYETHYLENE GLYCOL 3350 17 G: 17 POWDER, FOR SOLUTION ORAL at 08:19

## 2022-07-10 RX ADMIN — ALPRAZOLAM 0.25 MG: 0.25 TABLET ORAL at 12:12

## 2022-07-10 RX ADMIN — NYSTATIN: 100000 POWDER TOPICAL at 00:29

## 2022-07-10 RX ADMIN — NYSTATIN: 100000 POWDER TOPICAL at 08:35

## 2022-07-10 RX ADMIN — METHOCARBAMOL 500 MG: 500 TABLET ORAL at 17:41

## 2022-07-10 RX ADMIN — TORSEMIDE 100 MG: 20 TABLET ORAL at 08:16

## 2022-07-10 RX ADMIN — WARFARIN SODIUM 3 MG: 3 TABLET ORAL at 17:42

## 2022-07-10 RX ADMIN — ACETAMINOPHEN 650 MG: 325 TABLET, FILM COATED ORAL at 12:12

## 2022-07-10 RX ADMIN — CALCITRIOL CAPSULES 0.25 MCG 0.25 MCG: 0.25 CAPSULE ORAL at 17:42

## 2022-07-10 RX ADMIN — NYSTATIN: 100000 POWDER TOPICAL at 17:51

## 2022-07-10 RX ADMIN — ZOLPIDEM TARTRATE 5 MG: 5 TABLET, COATED ORAL at 21:52

## 2022-07-10 RX ADMIN — INSULIN GLARGINE 10 UNITS: 100 INJECTION, SOLUTION SUBCUTANEOUS at 21:52

## 2022-07-10 RX ADMIN — SEVELAMER HYDROCHLORIDE 2400 MG: 800 TABLET, FILM COATED PARENTERAL at 17:42

## 2022-07-10 RX ADMIN — SEVELAMER HYDROCHLORIDE 1600 MG: 800 TABLET, FILM COATED PARENTERAL at 08:16

## 2022-07-10 RX ADMIN — SEVELAMER HYDROCHLORIDE 1600 MG: 800 TABLET, FILM COATED PARENTERAL at 12:12

## 2022-07-10 RX ADMIN — CINACALCET 30 MG: 30 TABLET, FILM COATED ORAL at 08:16

## 2022-07-10 RX ADMIN — LIDOCAINE 5% 1 PATCH: 700 PATCH TOPICAL at 08:17

## 2022-07-10 RX ADMIN — LOPERAMIDE HYDROCHLORIDE 2 MG: 2 CAPSULE ORAL at 17:42

## 2022-07-10 RX ADMIN — INSULIN LISPRO 12 UNITS: 100 INJECTION, SOLUTION INTRAVENOUS; SUBCUTANEOUS at 08:35

## 2022-07-10 RX ADMIN — NIFEDIPINE 30 MG: 30 TABLET, FILM COATED, EXTENDED RELEASE ORAL at 08:17

## 2022-07-10 RX ADMIN — ACETAMINOPHEN 650 MG: 325 TABLET, FILM COATED ORAL at 20:08

## 2022-07-10 RX ADMIN — METHOCARBAMOL 500 MG: 500 TABLET ORAL at 08:17

## 2022-07-10 RX ADMIN — DICYCLOMINE HYDROCHLORIDE 10 MG: 10 CAPSULE ORAL at 20:08

## 2022-07-10 RX ADMIN — ALPRAZOLAM 0.25 MG: 0.25 TABLET ORAL at 23:23

## 2022-07-10 RX ADMIN — Medication 2000 UNITS: at 17:42

## 2022-07-10 RX ADMIN — DEXAMETHASONE SODIUM PHOSPHATE 6 MG: 4 INJECTION INTRA-ARTICULAR; INTRALESIONAL; INTRAMUSCULAR; INTRAVENOUS; SOFT TISSUE at 19:40

## 2022-07-10 RX ADMIN — INSULIN LISPRO 2 UNITS: 100 INJECTION, SOLUTION INTRAVENOUS; SUBCUTANEOUS at 00:30

## 2022-07-10 RX ADMIN — MENTHOL, METHYL SALICYLATE: 10; 15 CREAM TOPICAL at 17:51

## 2022-07-10 RX ADMIN — ATORVASTATIN CALCIUM 20 MG: 20 TABLET, FILM COATED ORAL at 17:42

## 2022-07-10 RX ADMIN — CARVEDILOL 25 MG: 25 TABLET, FILM COATED ORAL at 08:16

## 2022-07-10 RX ADMIN — LEVOTHYROXINE SODIUM 50 MCG: 50 TABLET ORAL at 08:17

## 2022-07-10 RX ADMIN — INSULIN LISPRO 6 UNITS: 100 INJECTION, SOLUTION INTRAVENOUS; SUBCUTANEOUS at 17:51

## 2022-07-10 RX ADMIN — Medication 3 MG: at 21:52

## 2022-07-10 RX ADMIN — Medication 1 CAPSULE: at 17:42

## 2022-07-10 RX ADMIN — PANTOPRAZOLE SODIUM 40 MG: 40 TABLET, DELAYED RELEASE ORAL at 05:44

## 2022-07-10 RX ADMIN — SERTRALINE HYDROCHLORIDE 75 MG: 50 TABLET ORAL at 08:16

## 2022-07-10 NOTE — ASSESSMENT & PLAN NOTE
Lab Results   Component Value Date    HGBA1C 9 4 (H) 07/09/2022     On Lantus and insulin sliding scale  Accu-Cheks per protocol      Blood Sugar Average: Last 72 hrs:  (P) 375 5     · Place pt on SSI   · Ada / renal diet

## 2022-07-10 NOTE — ASSESSMENT & PLAN NOTE
· Status post fall most likely secondary to ambulatory dysfunction with COVID-19/hypoxia    · Continue treatment and monitoring

## 2022-07-10 NOTE — H&P
[facility]  [facility] 1965[facility] 56 [facility]  [facility] MRN: [id number]  Unit/Bed#: ED 22 Encounter: [id number]  Primary Care Provider: Marlo Maurer MD   Date and time admitted to hospital: 7/9/2022  6:33 PM    * COVID-19  Assessment & Plan  Patient started on COVID-19 mild protocol  Dexamethasone and remdesivir started  Labs per COVID protocol  ESRD on hemodialysis  Assessment & Plan  Lab Results   Component Value Date    EGFR 3 06/28/2022    EGFR 5 01/12/2022    EGFR 8 01/09/2022    CREATININE 11 00 (H) 06/28/2022    CREATININE 7 64 (H) 01/12/2022    CREATININE 5 47 (H) 01/09/2022   Nephrology consult for hemodialysis  Essential hypertension  Assessment & Plan  Continue nifedipine and torsemide  Esophageal reflux  Assessment & Plan  Continue pantoprazole; also on Reglan and Bentyl as needed    Hypoxia  Assessment & Plan  Secondary to COVID-19  Started on mild COVID protocol  Fall  Assessment & Plan  Status post fall most likely secondary to ambulatory dysfunction with COVID-19/hypoxia  Chronic anticoagulation  Assessment & Plan  Continue Coumadin  PT INR pending  Pulmonary embolus [facility])  Assessment & Plan  Continue warfarin  Acquired hypothyroidism  Assessment & Plan  On levothyroxine 50 mcg daily  Diabetic polyneuropathy associated with type 2 diabetes mellitus [facility])  Assessment & Plan  Lab Results   Component Value Date    HGBA1C 7 9 (H) 09/30/2021     On Lantus and insulin sliding scale  Accu-Cheks per protocol  Blood Sugar Average: Last 72 hrs:            VTE Prophylaxis: Warfarin (Coumadin)  / sequential compression device   Code Status: Level 1 - Full Code as discussed with patient  POLST: There is no POLST form on file for this patient (pre-hospital)    Anticipated Length of Stay:  Patient will be admitted on an Inpatient basis with an anticipated length of stay of  greater than 2 midnights     Justification for Hospital Stay: Please see detailed plans noted above  Chief Complaint:     Hypoxia; status post fall  History of Present Illness:  Farrukh Banks is a 56 y o  female who has past medical history significant for morbidly obese woman with diabetes, hyperlipidemia, hypothyroidism, cardiomyopathy, end-stage renal disease on hemodialysis, insulin use, pulmonary embolism with warfarin use, coming in to the [facility] status post fall during hemodialysis  Patient was in dialysis session when suddenly she fell from standing  She hit her head  She was initially seen by trauma and it was assessed that she does not have any traumatic injury  However, she has been sick from [facility] demonstrating hypoxia with room air at 85%  Patient claims to have sick contacts at home who are COVID positive dim cells  Currently, the patient is able to lie flat on bed without any distress  She does not demonstrate any air hunger  She denies any other symptoms  Noted patient is vaccinated and boosted x1      Review of Systems:    Constitutional:  Denies fever or chills   Eyes:  Denies change in visual acuity   HENT:  Denies nasal congestion or sore throat   Respiratory:  Denies cough or shortness of breath only hypoxia as noted    Cardiovascular:  Denies chest pain or edema   GI:  Denies abdominal pain, nausea, vomiting, bloody stools or diarrhea   :  Denies dysuria   Musculoskeletal:  Denies back pain or joint pain   Integument:  Denies rash   Neurologic:  Denies headache, focal weakness or sensory changes   Endocrine:  Denies polyuria or polydipsia   Lymphatic:  Denies swollen glands   Psychiatric:  Denies depression or anxiety     Past Medical and Surgical History:   Past Medical History:   Diagnosis Date    Abnormal uterine bleeding (AUB)     Anemia     Anxiety     Arthritis     Chronic kidney disease     Claustrophobia     Diabetes mellitus ([facility]     Disease of thyroid gland     D[facility] venous thrombosis) ([facility])     End stage kidney disease ([facility]     Foot ulcer due to secondary DM ([facility] )     Hemodialysis patient ([facility] )     Tues, Thurs, Sat    Hypertension     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     right eye    Morbid obesity ([facility]     Osteomyelitis of fifth toe of right foot ([facility] )     Panic attacks     Pulmonary embolism (HCC)     Reflux esophagitis     Thrombophlebitis of saphenous vein     Warfarin anticoagulation      Past Surgical History:   Procedure Laterality Date    AMPUTATION      r 4th toe    ARTERIOVENOUS GRAFT PLACEMENT      CATARACT EXTRACTION Bilateral     CERVICAL BIOPSY  W/ LOOP ELECTRODE EXCISION       SECTION      DIALYSIS FISTULA CREATION      DILATION AND CURETTAGE OF UTERUS      ENDOMETRIAL ABLATION W/ NOVASURE      EYE SURGERY      HYSTERECTOMY      @ age 57 (complete)    IR AV FISTULAGRAM/GRAFTOGRAM  10/11/2019    IR AV FISTULAGRAM/GRAFTOGRAM  2020    IR AV FISTULAGRAM/GRAFTOGRAM  2020    IR NON-TUNNELED CENTRAL LINE PLACEMENT  2021    IR NON-TUNNELED CENTRAL LINE PLACEMENT  10/4/2021    OOPHORECTOMY Bilateral     @ age 55    [facility] Right 2021    Procedure: AMPUTATION TRANSMETATARSAL (TMA);  Surgeon: Trina Ramirez DPM;  Location: [facility];  Service: Podiatry    DE AMPUTATION TOE,MT-P JT Right 2020    Procedure: AMPUTATION TOE- 5th;  Surgeon: Trina Ramirez DPM;  Location: [facility];  Service: Podiatry    DE COLONOSCOPY FLX DX W/COLLJ SPEC WHEN PFRMD N/A 3/13/2019    Procedure: COLONOSCOPY;  Surgeon: Bipin Ocampo MD;  Location: [facility]; Service: Gastroenterology    DE ESOPHAGOGASTRODUODENOSCOPY TRANSORAL DIAGNOSTIC N/A 3/13/2019    Procedure: ESOPHAGOGASTRODUODENOSCOPY (EGD); Surgeon: Bipin Ocampo MD;  Location: [facility];   Service: Gastroenterology    DE LAPAROSCOPY W TOT HYSTERECT UTERUS 250 GRAM OR LESS N/A 2016    Procedure: HYSTERECTOMY LAPAROSCOPIC TOTAL [facility]), with bilateral salpingectomy;  Surgeon: Chase Gil DO;  Location: [facility];  Service: Gynecology    THROMBECTOMY / ARTERIOVENOUS GRAFT REVISION      TOE SURGERY Right     removal of the 4th toe    WOUND DEBRIDEMENT Right 10/8/2021    Procedure: R 1st MTPJ washout ;  Surgeon: Angel Rivera DPM;  Location: [facility];  Service: Podiatry       Meds/Allergies:  (Not in a hospital admission)      Allergies: Allergies   Allergen Reactions    Iodinated Diagnostic Agents Itching    Keflex [Cephalexin] Hives    Wound Dressing Adhesive Rash     History:  Marital Status: Single   Occupation:  She is disabled  Patient Pre-hospital Living Situation:  Lives at home  Patient Pre-hospital Level of Mobility:  Ambulatory  Patient Pre-hospital Diet Restrictions:  Diabetic regular  Substance Use History:   Social History     Substance and Sexual Activity   Alcohol Use Never    Alcohol/week: 0 0 standard drinks     Social History     Tobacco Use   Smoking Status Never Smoker   Smokeless Tobacco Never Used     Social History     Substance and Sexual Activity   Drug Use No       Family History:  Family History   Problem Relation Age of Onset    Heart disease Family     Diabetes Family     Heart disease Mother     Cancer Brother         neck    Throat cancer Brother     Ovarian cancer Maternal Aunt 40       Physical Exam:     Vitals:   Blood Pressure: 113/72 (07/09/22 1946)  Pulse: 87 (07/09/22 1946)  Temperature: (!) 100 8 °F (38 2 °C) (07/09/22 1923)  Temp Source: Tympanic (07/09/22 1923)  Respirations: 20 (07/09/22 1935)  Weight - Scale: 130 kg (285 lb 11 5 oz) (07/09/22 1920)  SpO2: 96 % (07/09/22 1946)    Constitutional:  Well developed, well nourished, no acute distress, non-toxic appearance   Eyes:  PERRL, conjunctiva normal   HENT:  Atraumatic, external ears normal, nose normal, oropharynx moist, no pharyngeal exudates   Neck- normal range of motion, no tenderness, supple   Respiratory:  No respiratory distress, bronchial breath sounds no rales, no wheezing ; no crackles  Cardiovascular:  Normal rate, normal rhythm, no murmurs, no gallops, no rubs   GI:  Soft, nondistended, normal bowel sounds, nontender, no organomegaly, no mass, no rebound, no guarding   :  No costovertebral angle tenderness   Musculoskeletal:  No edema, no tenderness, no deformities  Back- no tenderness  Integument:  Well hydrated, no rash   Lymphatic:  No lymphadenopathy noted   Neurologic:  Alert &awake, communicative, CN 2-12 normal, normal motor function, normal sensory function, no focal deficits noted   Psychiatric:  Speech and behavior appropriate       Lab Results: I have personally reviewed pertinent reports  Results from last 7 days   Lab Units 07/09/22 1957   WBC Thousand/uL 5 83   HEMOGLOBIN g/dL 9 0*   HEMATOCRIT % 27 1*   PLATELETS Thousands/uL 137*   NEUTROS PCT % 77*   LYMPHS PCT % 7*   MONOS PCT % 13*   EOS PCT % 2     Results from last 7 days   Lab Units 07/09/22  1932   CO2, I-STAT mmol/L 33*   GLUCOSE, ISTAT mg/dl 319*     Results from last 7 days   Lab Units 07/09/22 1957   INR  3 66*       Imaging: I have personally reviewed pertinent reports  CT abdomen pelvis wo contrast    Result Date: 6/28/2022  Narrative: CT ABDOMEN AND PELVIS WITHOUT IV CONTRAST INDICATION:   Flank pain, kidney stone suspected Right flank pain  COMPARISON:  11/10/2021  TECHNIQUE:  CT examination of the abdomen and pelvis was performed without intravenous contrast  This examination was performed without intravenous contrast in the context of the critical nationwide Omnipaque shortage  Axial, sagittal, and coronal 2D reformatted images were created from the source data and submitted for interpretation  Radiation dose length product (DLP) for this visit:  1386 52 mGy-cm     This examination, like all CT scans performed in the [facility], was performed utilizing techniques to minimize radiation dose exposure, including the use of iterative reconstruction and automated exposure control  Enteric contrast was not administered  FINDINGS: ABDOMEN LOWER CHEST:  No clinically significant abnormality identified in the visualized lower chest  LIVER/BILIARY TREE:  Unremarkable  GALLBLADDER:  No calcified gallstones  No pericholecystic inflammatory change  Gallbladder is contracted which limits evaluation  SPLEEN:  Unremarkable  PANCREAS:  Unremarkable  ADRENAL GLANDS:  Unremarkable  KIDNEYS/URETERS:  The kidneys are bilaterally atrophic  No hydronephrosis  Superior pole left renal cyst measuring 1 1 cm is incompletely evaluated on this noncontrast exam but stable from prior study  Exophytic cyst along lower pole the right kidney is also stable in size  Multiple additional subcentimeter hypodensities within bilateral kidneys are too small to characterize  Exophytic hypodensity along the superior pole the right kidney is diminished in size from prior study  Evaluation for solid  renal lesions is limited on this noncontrast study  Multiple small calcifications within the bilateral kidneys may represent punctate nonobstructing calculi versus vascular calcifications  STOMACH AND BOWEL:  There is colonic diverticulosis without focal inflammatory changes to suggest acute diverticulitis  No abnormal small bowel dilatation  APPENDIX:  No findings to suggest appendicitis  ABDOMINOPELVIC CAVITY:  No ascites  No pneumoperitoneum  No lymphadenopathy  VESSELS:  Moderate to severe atherosclerotic calcifications are noted within the abdominal aorta and branching vessels  PELVIS REPRODUCTIVE ORGANS:  Surgical changes of prior hysterectomy  URINARY BLADDER:  Bladder is nondistended, this limits its evaluation  No gross abnormality is seen  ABDOMINAL WALL/INGUINAL REGIONS:  Moderately sized fat filled umbilical hernia  OSSEOUS STRUCTURES:  No acute fracture or destructive osseous lesion    The bones demonstrate a diffusely sclerotic appearance, possibly related to underlying medical renal disease  Moderate degenerative changes are seen at the L4-L5 level  Impression: 1  No CT evidence of acute process within the abdomen or pelvis within the confines of a noncontrast study  2   Stable findings as detailed above  Workstation performed: [id number]     CT chest abdomen pelvis wo contrast    Result Date: 7/9/2022  Narrative: CT CHEST, ABDOMEN AND PELVIS WITHOUT IV CONTRAST INDICATION:   trauma  COMPARISON:  CT 6/28/2022 and priors TECHNIQUE: CT examination of the chest, abdomen and pelvis was performed without intravenous contrast  This examination was performed without intravenous contrast in the context of the critical nationwide Omnipaque shortage  Axial, sagittal, and coronal 2D reformatted images were created from the source data and submitted for interpretation  Radiation dose length product (DLP) for this visit:  2760 15 mGy-cm   This examination, like all CT scans performed in the [facility], was performed utilizing techniques to minimize radiation dose exposure, including the use of iterative reconstruction and automated exposure control  Enteric contrast was not administered  FINDINGS: CHEST LUNGS:  Lungs are clear  Left upper granuloma  There is no tracheal or endobronchial lesion  PLEURA:  Unremarkable  HEART/GREAT VESSELS: Coronary atherosclerosis  No thoracic aortic aneurysm  Low-density blood pool suggesting underlying anemia  MEDIASTINUM AND MIKY:  Unremarkable  CHEST WALL AND LOWER NECK:  Unremarkable  ABDOMEN LIVER/BILIARY TREE:  Unremarkable  GALLBLADDER:  No calcified gallstones  No pericholecystic inflammatory change  SPLEEN:  Unremarkable  PANCREAS:  Unremarkable  ADRENAL GLANDS:  Unremarkable  KIDNEYS/URETERS:  Stable small left renal cyst  Bilateral renal atrophy  No hydronephrosis  STOMACH AND BOWEL:  Colon diverticula  APPENDIX:  No findings to suggest appendicitis  ABDOMINOPELVIC CAVITY:  No ascites    No pneumoperitoneum  No lymphadenopathy  VESSELS:  Atherosclerotic aorta and branch vessels  PELVIS REPRODUCTIVE ORGANS:  Unremarkable for patient's age  URINARY BLADDER:  Unremarkable  ABDOMINAL WALL/INGUINAL REGIONS:  Stable fat-containing umbilical hernia  OSSEOUS STRUCTURES:  No acute fracture or destructive osseous lesion  Bones appear somewhat increased in density diffusely, possibly from underlying medical renal osteodystrophy  Impression: 1  No acute traumatic findings  Incidental findings as above  I personally discussed this study with Dr Randolph Rinne on 7/9/2022 at 8:10 PM  Workstation performed: [id number]     TRAUMA - CT head wo contrast    Result Date: 7/9/2022  Narrative: CT BRAIN - WITHOUT CONTRAST INDICATION:   TRAUMA  COMPARISON:  None  TECHNIQUE:  CT examination of the brain was performed  In addition to axial images, sagittal and coronal 2D reformatted images were created and submitted for interpretation  Radiation dose length product (DLP) for this visit:  940 53 mGy-cm   This examination, like all CT scans performed in the [facility], was performed utilizing techniques to minimize radiation dose exposure, including the use of iterative  reconstruction and automated exposure control  IMAGE QUALITY:  Diagnostic  FINDINGS: PARENCHYMA:  No intracranial mass, mass effect or midline shift  No CT signs of acute infarction  No acute parenchymal hemorrhage  VENTRICLES AND EXTRA-AXIAL SPACES:  Normal for the patient's age  Stable extra-axial calcified meningioma in the left frontal region  This measures approximately 1 2 x 1 1 cm  VISUALIZED ORBITS AND PARANASAL SINUSES:  Unremarkable  CALVARIUM AND EXTRACRANIAL SOFT TISSUES:  Bones appear somewhat increased in density diffusely, possibly from underlying medical renal osteodystrophy  Impression: No acute intracranial abnormality    I personally discussed this study with Dr Randolph Rinne on 7/9/2022 at 8:10 PM  Workstation performed: [id number]     CT head without contrast    Result Date: 6/28/2022  Narrative: CT BRAIN - WITHOUT CONTRAST INDICATION:   Headache, new or worsening (Age >= 50y) New headache  COMPARISON:  None  TECHNIQUE:  CT examination of the brain was performed  In addition to axial images, sagittal and coronal 2D reformatted images were created and submitted for interpretation  Radiation dose length product (DLP) for this visit:  913 21 mGy-cm   This examination, like all CT scans performed in the [facility], was performed utilizing techniques to minimize radiation dose exposure, including the use of iterative  reconstruction and automated exposure control  IMAGE QUALITY:  Diagnostic  FINDINGS: PARENCHYMA:  No intracranial mass, mass effect or midline shift  No CT signs of acute infarction  No acute parenchymal hemorrhage  VENTRICLES AND EXTRA-AXIAL SPACES:  Ventricles appear normal   Stable left frontal extra-axial calcified meningioma measuring 12 x 11 mm  VISUALIZED ORBITS AND PARANASAL SINUSES:  Unremarkable  CALVARIUM AND EXTRACRANIAL SOFT TISSUES:  Normal      Impression: No acute intracranial abnormality  Stable left frontal extra-axial calcified meningioma measuring 12 x 11 mm  Workstation performed: [id number]     TRAUMA - CT spine cervical wo contrast    Result Date: 7/9/2022  Narrative: CT CERVICAL SPINE - WITHOUT CONTRAST INDICATION:   TRAUMA  COMPARISON:  CT 6/9/2021 TECHNIQUE:  CT examination of the cervical spine was performed without intravenous contrast   Contiguous axial images were obtained  Sagittal and coronal reconstructions were performed  Radiation dose length product (DLP) for this visit:  1052 42 mGy-cm   This examination, like all CT scans performed in the [facility], was performed utilizing techniques to minimize radiation dose exposure, including the use of iterative reconstruction and automated exposure control    IMAGE QUALITY: Diagnostic  FINDINGS: ALIGNMENT:  Normal alignment of the cervical spine  No subluxation  VERTEBRAL BODIES:  No fracture  Bones appear somewhat increased in density diffusely, possibly from underlying medical renal osteodystrophy  DEGENERATIVE CHANGES:  No significant cervical degenerative changes are noted  PREVERTEBRAL AND PARASPINAL SOFT TISSUES:  Unremarkable  THORACIC INLET:  Normal      Impression: No cervical spine fracture or traumatic malalignment  I personally discussed this study with Dr Alise Bloch on 7/9/2022 at 8:10 PM   Workstation performed: [id number]     XR Trauma multiple (SLB/RA trauma bay ONLY)    Result Date: 7/9/2022  Narrative: TRAUMA SERIES INDICATION:  TRAUMA  COMPARISON:  None VIEWS:  XR TRAUMA MULTIPLE  FINDINGS: CHEST: Supine frontal view of the chest is obtained  Cardiomediastinal silhouette is within normal limits accounting for technique and patient positioning  Relative opacity of the left lung compared to the right  This may reflect positioning  Left lung contusion or pneumonia cannot be excluded  No pneumothorax is seen on this supine film  Upright images are more sensitive to detect anterior pneumothoraces if relevant  No displaced fractures  Impression: Increased opacity of the left lung compared to the right which could represent contusion or pneumonia but may at least in part relate to patient rotation  Patient has CT chest abdomen and pelvis ordered  Workstation performed: [id number]     [address] [facility] bedside procedure    Result Date: 7/9/2022  Narrative: 1 [id number]  8 [id number]  1 [id number]        ** Please Note: Dragon 360 Dictation voice to text software was used in the creation of this document   **

## 2022-07-10 NOTE — PROGRESS NOTES
[facility]  Progress Note Arvind Fritz 1965, 56 y o  female MRN: [id number]  Unit/Bed#: [facility] 630-01 Encounter: [id number]  Primary Care Provider: Adair Desai MD   Date and time admitted to hospital: 7/9/2022  6:33 PM    * COVID-19  Assessment & Plan  · Patient started on COVID-19 mild protocol  · Dexamethasone and remdesivir started  · Labs per COVID protocol  · Pt is removing as needed     Chronic anticoagulation  Assessment & Plan  · Pt on coumadin , currently on hold   · INR elevated 3 66 did not receive last nights coumadin - will resume at this time today 2 64 goal 2-3  · Will start low dose 3mg and uptitrate after tomorrows read  · Would have low threshold to image if change in mental status sp fall at home   · Repeat inr stat     Hypoxia  Assessment & Plan  · Probably in setting of covid vs hemo pt   · Started on mild COVID protocol  Fall  Assessment & Plan  · Status post fall most likely secondary to ambulatory dysfunction with COVID-19/hypoxia  · Continue treatment and monitoring     Pulmonary embolus (HCC)  Assessment & Plan  · Continue warfarin  Acquired hypothyroidism  Assessment & Plan  ·  levothyroxine 50 mcg daily  Type 2 diabetes mellitus [facility])  Assessment & Plan  Lab Results   Component Value Date    HGBA1C 9 4 (H) 07/09/2022       Recent Labs     07/09/22  2250 07/10/22  0826 07/10/22  1219 07/10/22  1746   POCGLU 261* 490* 51* 296*       Blood Sugar Average: Last 72 hrs:  (P) 274 5   · Uncontrolled and labile in setting of poorly controlled diabetic on iv steroids for covid   · Increased sliding scale to 3 added bedtime and 2am chk dt her being labile   · May need to reduce level but will continue for now noting lunch time drop     Diabetic polyneuropathy associated with type 2 diabetes mellitus (HCC)  Assessment & Plan  Lab Results   Component Value Date    HGBA1C 9 4 (H) 07/09/2022     On Lantus and insulin sliding scale    Accu-Cheks per protocol  Blood Sugar Average: Last 72 hrs:  (P) 375 5     · Place pt on SSI   · [employer] / renal diet     Hyperkalemia  Assessment & Plan  Last evening mildly hemolyzed but treated with kaexylate   · Pt now with diarrhea   · Requesting immodium will order one x dose   · Pt unhappy at this time feels sob and not happy she has covid   · Will require hemo appreciate nephrology     Esophageal reflux  Assessment & Plan  · Continue pantoprazole; also on Reglan and Bentyl as needed    ESRD on hemodialysis  Assessment & Plan  Lab Results   Component Value Date    EGFR 5 07/09/2022    EGFR 3 06/28/2022    EGFR 5 01/12/2022    CREATININE 7 44 (H) 07/09/2022    CREATININE 11 00 (H) 06/28/2022    CREATININE 7 64 (H) 01/12/2022   · Nephrology consult for hemodialysis  · No labs since yesterday will obtain labs stat now   · And determine treatment based on electrolytes etc   · Nephrology aware     Essential hypertension  Assessment & Plan  · Continue nifedipine and torsemide  VTE Pharmacologic Prophylaxis:   High Risk (Score >/= 5) - Pharmacological DVT Prophylaxis Ordered: warfarin (Coumadin)  Sequential Compression Devices Ordered  currently on hold in setting of elevated inr     Patient Centered Rounds: I performed bedside rounds with nursing staff today  Discussions with Specialists or Other Care Team Provider: nursing     Education and Discussions with Family / Patient: Patient declined call to   Time Spent for Care: 45 minutes  More than 50% of total time spent on counseling and coordination of care as described above      Current Length of Stay: 1 day(s)  Current Patient Status: Inpatient   Certification Statement: The patient will continue to require additional inpatient hospital stay due to ongoing need for treatment and monitoring   Discharge Plan: Anticipate discharge in 48-72 hrs to depending on workup pt recs sp fall     Code Status: Level 1 - Full Code    Subjective:   Pt is rather frustrated with being on isolation feels a little lonely and feels like there is not a lot of interaction  She does feel sob on occasion she is taking off and putting on her oxygen based on how she feels  She states she is having diarrhea  She has neurosx anahi for a number of months in the future and she has fair amt of back pain   Objective:     Vitals:   Temp (24hrs), Av °F (37 2 °C), Min:99 °F (37 2 °C), Max:99 °F (37 2 °C)    Temp:  [99 °F (37 2 °C)] 99 °F (37 2 °C)  HR:  [78-85] 78  Resp:  [18] 18  BP: (151-207)/() 164/53  SpO2:  [94 %-97 %] 94 %  Body mass index is 46 12 kg/m²  Input and Output Summary (last 24 hours): Intake/Output Summary (Last 24 hours) at 7/10/2022 2010  Last data filed at 7/10/2022 0801  Gross per 24 hour   Intake 440 ml   Output --   Net 440 ml       Physical Exam:   Physical Exam  Constitutional:       General: She is not in acute distress  Appearance: She is obese  She is not ill-appearing, toxic-appearing or diaphoretic  HENT:      Head: Normocephalic and atraumatic  Nose: No congestion  Mouth/Throat:      Pharynx: Oropharynx is clear  Eyes:      General:         Right eye: No discharge  Left eye: No discharge  Conjunctiva/sclera: Conjunctivae normal    Cardiovascular:      Rate and Rhythm: Normal rate  Heart sounds: No murmur heard  No friction rub  No gallop  Pulmonary:      Effort: No respiratory distress  Breath sounds: No stridor  No wheezing, rhonchi or rales  Comments: Poor effort   Chest:      Chest wall: No tenderness  Abdominal:      General: There is no distension  Palpations: There is no mass  Tenderness: There is no abdominal tenderness  There is no guarding or rebound  Hernia: No hernia is present  Musculoskeletal:         General: Deformity (right foot well healed tma ) present  Comments: Left foot toe with some scabbing    Skin:     Coloration: Skin is not jaundiced or pale  Findings: No bruising, erythema, lesion or rash  Neurological:      Mental Status: She is oriented to person, place, and time     Psychiatric:      Comments: Annoyed           Additional Data:     Labs:  Results from last 7 days   Lab Units 07/10/22  1342   WBC Thousand/uL 5 27   HEMOGLOBIN g/dL 8 6*   HEMATOCRIT % 26 3*   PLATELETS Thousands/uL 149   NEUTROS PCT % 72   LYMPHS PCT % 14   MONOS PCT % 13*   EOS PCT % 0     Results from last 7 days   Lab Units 07/10/22  1342   SODIUM mmol/L 137   POTASSIUM mmol/L 5 5*   CHLORIDE mmol/L 101   CO2 mmol/L 27   BUN mg/dL 68*   CREATININE mg/dL 8 83*   ANION GAP mmol/L 9   CALCIUM mg/dL 8 4   ALBUMIN g/dL 2 9*   TOTAL BILIRUBIN mg/dL 0 35   ALK PHOS U/L 123*   ALT U/L 18   AST U/L 13   GLUCOSE RANDOM mg/dL 93     Results from last 7 days   Lab Units 07/10/22  1342   INR  2 64*     Results from last 7 days   Lab Units 07/10/22  1746 07/10/22  1219 07/10/22  0826 07/09/22  2250   POC GLUCOSE mg/dl 296* 51* 490* 261*     Results from last 7 days   Lab Units 07/09/22  2346   HEMOGLOBIN A1C % 9 4*           Lines/Drains:  Invasive Devices  Report    Peripheral Intravenous Line  Duration           Peripheral IV 07/09/22 Right Forearm 1 day          Line  Duration           Hemodialysis AV Fistula 02/20/18 Left Forearm 1601 days                      Imaging: Reviewed radiology reports from this admission including: chest CT scan    Recent Cultures (last 7 days):         Last 24 Hours Medication List:   Current Facility-Administered Medications   Medication Dose Route Frequency Provider Last Rate    acetaminophen  650 mg Oral Q6H PRN Adrianne Choudhury MD      albuterol  2 puff Inhalation Q6H PRN Adrianne Choudhury MD      ALPRAZolam  0 25 mg Oral 4x Daily PRN Adrianne Choudhury MD      aluminum-magnesium hydroxide-simethicone  30 mL Oral Q6H PRN Adrianne Choudhury MD      atorvastatin  20 mg Oral QPM Adrianne Choudhury MD      b complex-vitamin C-folic acid 1 capsule Oral Daily With Natan Pozo MD      calcitriol  0 25 mcg Oral Daily Ginny Sandoval DO      carvedilol  25 mg Oral BID Sedrick Allen MD      cholecalciferol  2,000 Units Oral Daily Ginny Sandoval, [place]      cinacalcet  30 mg Oral Daily Sedrick Allen MD      dexamethasone  6 mg Intravenous Q24H Sedrick Allen MD      dicyclomine  10 mg Oral Q6H PRN Sedrick Allen MD      docusate sodium  100 mg Oral BID PRN Sedrick Allen MD      insulin glargine  10 Units Subcutaneous HS LOIS Donaldson      [START ON 7/11/2022] insulin lispro  1-6 Units Subcutaneous 0200 LOIS Donaldson      insulin lispro  2-12 Units Subcutaneous HS LOIS Donaldson      insulin lispro  2-12 Units Subcutaneous TID AC LOIS Donaldson      levothyroxine  50 mcg Oral Daily Sedrick Allen MD      lidocaine  1 patch Topical Daily Sedrick Allen MD      loratadine  10 mg Oral Daily Sedrick Allen MD      melatonin  3 mg Oral HS Sedrick Allen MD      menthol-methyl salicylate   Apply externally 4x Daily PRN Sasha Turner PA-C      methocarbamol  500 mg Oral BID Sedrick Allen MD      NIFEdipine  30 mg Oral Daily Sedrick Allen MD      nystatin   Topical BID Sedrick Allen MD      ondansetron  4 mg Intravenous Q6H PRN Sedrick Allen MD      pantoprazole  40 mg Oral Early Morning Sedrick Allen MD      polyethylene glycol  17 g Oral Daily Sedrick Allen MD      remdesivir  100 mg Intravenous Q24H Sedrick Allen MD      sertraline  75 mg Oral Daily Sedrick Allen MD      sevelamer  2,400 mg Oral TID With Meals Ginny Sandoval DO      sodium polystyrene sulfonate  15 g Oral PRN Sedrick Allen MD      torsemide  100 mg Oral Daily Sedrick Allen MD      warfarin  3 mg Oral Daily (warfarin) LOIS Donaldson      zolpidem  5 mg Oral HS Sedrick Allen MD Today, Patient Was Seen By: LOIS Whiteside    **Please Note: This note may have been constructed using a voice recognition system  **

## 2022-07-10 NOTE — PLAN OF CARE
Problem: PAIN - ADULT  Goal: Verbalizes/displays adequate comfort level or baseline comfort level  Description: Interventions:  - Encourage patient to monitor pain and request assistance  - Assess pain using appropriate pain scale  - Administer analgesics based on type and severity of pain and evaluate response  - Implement non-pharmacological measures as appropriate and evaluate response  - Consider cultural and social influences on pain and pain management  - Notify physician/advanced practitioner if interventions unsuccessful or patient reports new pain  Outcome: Progressing     Problem: SAFETY ADULT  Goal: Patient will remain free of falls  Description: INTERVENTIONS:  - Educate patient/family on patient safety including physical limitations  - Instruct patient to call for assistance with activity   - Consult OT/PT to assist with strengthening/mobility   - Keep Call bell within reach  - Keep bed low and locked with side rails adjusted as appropriate  - Keep care items and personal belongings within reach  - Initiate and maintain comfort rounds  - Make Fall Risk Sign visible to staff  - Offer Toileting every  Hours, in advance of need  - Initiate/Maintain alarm  - Obtain necessary fall risk management equipment:   - Apply yellow socks and bracelet for high fall risk patients  - Consider moving patient to room near nurses station  Outcome: Progressing  Goal: Maintain or return to baseline ADL function  Description: INTERVENTIONS:  -  Assess patient's ability to carry out ADLs; assess patient's baseline for ADL function and identify physical deficits which impact ability to perform ADLs (bathing, care of mouth/teeth, toileting, grooming, dressing, etc )  - Assess/evaluate cause of self-care deficits   - Assess range of motion  - Assess patient's mobility; develop plan if impaired  - Assess patient's need for assistive devices and provide as appropriate  - Encourage maximum independence but intervene and supervise when necessary  - Involve family in performance of ADLs  - Assess for home care needs following discharge   - Consider OT consult to assist with ADL evaluation and planning for discharge  - Provide patient education as appropriate  Outcome: Progressing  Goal: Maintains/Returns to pre admission functional level  Description: INTERVENTIONS:  - Perform BMAT or MOVE assessment daily    - Set and communicate daily mobility goal to care team and patient/family/caregiver  - Collaborate with rehabilitation services on mobility goals if consulted  - Perform Range of Motion  times a day  - Reposition patient every  hours    - Dangle patient  times a day  - Stand patient  times a day  - Ambulate patient  times a day  - Out of bed to chair  times a day   - Out of bed for meals  times a day  - Out of bed for toileting  - Record patient progress and toleration of activity level   Outcome: Progressing     Problem: DISCHARGE PLANNING  Goal: Discharge to home or other facility with appropriate resources  Description: INTERVENTIONS:  - Identify barriers to discharge w/patient and caregiver  - Arrange for needed discharge resources and transportation as appropriate  - Identify discharge learning needs (meds, wound care, etc )  - Arrange for interpretive services to assist at discharge as needed  - Refer to Case Management Department for coordinating discharge planning if the patient needs post-hospital services based on physician/advanced practitioner order or complex needs related to functional status, cognitive ability, or social support system  Outcome: Progressing     Problem: Prexisting or High Potential for Compromised Skin Integrity  Goal: Skin integrity is maintained or improved  Description: INTERVENTIONS:  - Identify patients at risk for skin breakdown  - Assess and monitor skin integrity  - Assess and monitor nutrition and hydration status  - Monitor labs   - Assess for incontinence   - Turn and reposition patient  - Assist with mobility/ambulation  - Relieve pressure over bony prominences  - Avoid friction and shearing  - Provide appropriate hygiene as needed including keeping skin clean and dry  - Evaluate need for skin moisturizer/barrier cream  - Collaborate with interdisciplinary team   - Patient/family teaching  - Consider wound care consult   Outcome: Progressing     Problem: Potential for Falls  Goal: Patient will remain free of falls  Description: INTERVENTIONS:  - Educate patient/family on patient safety including physical limitations  - Instruct patient to call for assistance with activity   - Consult OT/PT to assist with strengthening/mobility   - Keep Call bell within reach  - Keep bed low and locked with side rails adjusted as appropriate  - Keep care items and personal belongings within reach  - Initiate and maintain comfort rounds  - Make Fall Risk Sign visible to staff  - Offer Toileting every  Hours, in advance of need  - Initiate/Maintain alarm  - Obtain necessary fall risk management equipmen  - Apply yellow socks and bracelet for high fall risk patients  - Consider moving patient to room near nurses station  Outcome: Progressing     Problem: Nutrition/Hydration-ADULT  Goal: Nutrient/Hydration intake appropriate for improving, restoring or maintaining nutritional needs  Description: Monitor and assess patient's nutrition/hydration status for malnutrition  Collaborate with interdisciplinary team and initiate plan and interventions as ordered  Monitor patient's weight and dietary intake as ordered or per policy  Utilize nutrition screening tool and intervene as necessary  Determine patient's food preferences and provide high-protein, high-caloric foods as appropriate       INTERVENTIONS:  - Monitor oral intake, urinary output, labs, and treatment plans  - Assess nutrition and hydration status and recommend course of action  - Evaluate amount of meals eaten  - Assist patient with eating if necessary   - Allow adequate time for meals  - Recommend/ encourage appropriate diets, oral nutritional supplements, and vitamin/mineral supplements  - Order, calculate, and assess calorie counts as needed  - Recommend, monitor, and adjust tube feedings and TPN/PPN based on assessed needs  - Assess need for intravenous fluids  - Provide specific nutrition/hydration education as appropriate  - Include patient/family/caregiver in decisions related to nutrition  Outcome: Progressing

## 2022-07-10 NOTE — ASSESSMENT & PLAN NOTE
Patient started on COVID-19 mild protocol  Dexamethasone and remdesivir started  Labs per COVID protocol

## 2022-07-10 NOTE — PLAN OF CARE
Problem: PAIN - ADULT  Goal: Verbalizes/displays adequate comfort level or baseline comfort level  Description: Interventions:  - Encourage patient to monitor pain and request assistance  - Assess pain using appropriate pain scale  - Administer analgesics based on type and severity of pain and evaluate response  - Implement non-pharmacological measures as appropriate and evaluate response  - Consider cultural and social influences on pain and pain management  - Notify physician/advanced practitioner if interventions unsuccessful or patient reports new pain  Outcome: Progressing     Problem: SAFETY ADULT  Goal: Patient will remain free of falls  Description: INTERVENTIONS:  - Educate patient/family on patient safety including physical limitations  - Instruct patient to call for assistance with activity   - Consult OT/PT to assist with strengthening/mobility   - Keep Call bell within reach  - Keep bed low and locked with side rails adjusted as appropriate  - Keep care items and personal belongings within reach  - Initiate and maintain comfort rounds  - Make Fall Risk Sign visible to staff  - Offer Toileting every 2 Hours, in advance of need  - Initiate/Maintain bed alarm  - Apply yellow socks and bracelet for high fall risk patients  - Consider moving patient to room near nurses station  Outcome: Progressing  Goal: Maintain or return to baseline ADL function  Description: INTERVENTIONS:  -  Assess patient's ability to carry out ADLs; assess patient's baseline for ADL function and identify physical deficits which impact ability to perform ADLs (bathing, care of mouth/teeth, toileting, grooming, dressing, etc )  - Assess/evaluate cause of self-care deficits   - Assess range of motion  - Assess patient's mobility; develop plan if impaired  - Assess patient's need for assistive devices and provide as appropriate  - Encourage maximum independence but intervene and supervise when necessary  - Involve family in performance of ADLs  - Assess for home care needs following discharge   - Consider OT consult to assist with ADL evaluation and planning for discharge  - Provide patient education as appropriate  Outcome: Progressing  Goal: Maintains/Returns to pre admission functional level  Description: INTERVENTIONS:  - Perform BMAT or MOVE assessment daily    - Set and communicate daily mobility goal to care team and patient/family/caregiver  - Collaborate with rehabilitation services on mobility goals if consulted  - Perform Range of Motion 3 times a day  - Reposition patient every 2 hours    - Dangle patient 3 times a day  - Stand patient 1 times a day  - Ambulate patient 3 times a day  - Out of bed to chair 3 times a day   - Out of bed for meals 3 times a day  - Out of bed for toileting  - Record patient progress and toleration of activity level   Outcome: Progressing     Problem: DISCHARGE PLANNING  Goal: Discharge to home or other facility with appropriate resources  Description: INTERVENTIONS:  - Identify barriers to discharge w/patient and caregiver  - Arrange for needed discharge resources and transportation as appropriate  - Identify discharge learning needs (meds, wound care, etc )  - Arrange for interpretive services to assist at discharge as needed  - Refer to Case Management Department for coordinating discharge planning if the patient needs post-hospital services based on physician/advanced practitioner order or complex needs related to functional status, cognitive ability, or social support system  Outcome: Progressing

## 2022-07-10 NOTE — ASSESSMENT & PLAN NOTE
· Patient started on COVID-19 mild protocol  · Dexamethasone and remdesivir started  · Labs per COVID protocol    · Pt is removing as needed

## 2022-07-10 NOTE — ASSESSMENT & PLAN NOTE
· Pt on coumadin , currently on hold   · INR elevated 3 66 did not receive last nights coumadin - will resume at this time today 2 64 goal 2-3  · Will start low dose 3mg and uptitrate after tomorrows read  · Would have low threshold to image if change in mental status sp fall at home   · Repeat inr stat

## 2022-07-10 NOTE — ASSESSMENT & PLAN NOTE
Lab Results   Component Value Date    EGFR 3 06/28/2022    EGFR 5 01/12/2022    EGFR 8 01/09/2022    CREATININE 11 00 (H) 06/28/2022    CREATININE 7 64 (H) 01/12/2022    CREATININE 5 47 (H) 01/09/2022   Nephrology consult for hemodialysis

## 2022-07-10 NOTE — ASSESSMENT & PLAN NOTE
Lab Results   Component Value Date    EGFR 5 07/09/2022    EGFR 3 06/28/2022    EGFR 5 01/12/2022    CREATININE 7 44 (H) 07/09/2022    CREATININE 11 00 (H) 06/28/2022    CREATININE 7 64 (H) 01/12/2022   · Nephrology consult for hemodialysis    · No labs since yesterday will obtain labs stat now   · And determine treatment based on electrolytes etc   · Nephrology aware

## 2022-07-10 NOTE — CONSULTS
Consultation - Nephrology   Bonnie Rivera 56 y o  female MRN: [id number]  Unit/Bed#: [facility] 630-01 Encounter: [id number]      ASSESSMENT & PLAN    37-year-old female with end-stage kidney disease on hemodialysis presented from dialysis center after a fall  Has an elevated INR and positive COVID infection    1  End-stage kidney disease on hemodialysis Tuesday Thursday Saturday  o Left lower extremity AV fistula  o Target weight of 124 kg  o 138 sodium, 2 potassium, 40 bicarb, 2 5 calcium bath  o Completed 2 hours of treatment yesterday  o Will evaluate for additional treatment tomorrow     2  Hyperkalemia with hemolyzed specimen  o Potassium 5 8 after treatment yesterday repeating potassium now  o This was a hemolyzed specimen    3  HTN in the setting of end-stage kidney disease  o Carvedilol 25 mg twice daily     4  Anemia  o Venofer 50 mg 1 time per week    5  BMD  o Calcitriol 0 25 mcg 3 times per week  o Sensipar 30 mg daily  o Vitamin D3 2000 units daily  o Renvela 2 4 g powder 3 times daily    6   Status post fall, COVID 19 pneumonia  o Ongoing COVID workup  o Trauma team evaluate  o Repeating INR       HISTORY OF PRESENT ILLNESS:  Requesting Physician: Naveen Bobby MD  Reason for Consult:  End-stage kidney disease on hemodialysis    Bonnie Rivera is a 56y o  year old female who was admitted for evaluation after a fall and chest pain patient is on dialysis Tuesday Thursday Saturday she has a history of diabetes she is con chronic anticoagulation with warfarin she was complaining of chest pain and concern for COVID as she has multiple sick contacts at home she had a fall at dialysis and was brought into the emergency room    PAST MEDICAL HISTORY:  Past Medical History:   Diagnosis Date    Abnormal uterine bleeding (AUB)     Anemia     Anxiety     Arthritis     Chronic kidney disease     Claustrophobia     [facility] mellitus [facility]     Disease of thyroid gland     DVT (deep venous thrombosis) ([facility]     End stage kidney disease [facility]     Foot ulcer due to secondary DM ([facility] )     [facility]od[facility] patient [facility]     Tues, Thurs, Sat    Hypertension     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[facility] )     right eye    Morbid obesity ([facility]     Osteomyelitis of fifth toe of right foot ([facility] )     Panic attacks     Pulmonary embolism (HCC)     Reflux esophagitis     Thrombophlebitis of saphenous vein     Warfarin anticoagulation        PAST SURGICAL HISTORY:  Past Surgical History:   Procedure Laterality Date    AMPUTATION      r 4th toe    ARTERIOVENOUS GRAFT PLACEMENT      CATARACT EXTRACTION Bilateral     CERVICAL BIOPSY  W/ LOOP ELECTRODE EXCISION       SECTION      DIALYSIS FISTULA CREATION      DILATION AND CURETTAGE OF UTERUS      ENDOMETRIAL ABLATION W/ NOVASURE      EYE SURGERY      HYSTERECTOMY      @ age 57 (complete)    IR AV FISTULAGRAM/GRAFTOGRAM  10/11/2019    IR AV FISTULAGRAM/GRAFTOGRAM  2020    IR AV FISTULAGRAM/GRAFTOGRAM  2020    IR NON-TUNNELED CENTRAL LINE PLACEMENT  2021    IR NON-TUNNELED CENTRAL LINE PLACEMENT  10/4/2021    OOPHORECTOMY Bilateral     @ age 55    [facility] 2021    Procedure: AMPUTATION TRANSMETATARSAL (TMA);  Surgeon: Farhat Jay DPM;  Location[facility];  Service: Podiatry    DC AMPUTATION TOE,MT-P JT Right 2020    Procedure: AMPUTATION TOE- 5th;  Surgeon: Farhat Jay DPM;  Location: [facility];  Service: Podiatry    DC COLONOSCOPY FLX DX W/COLLJ SPEC WHEN PFRMD N/A 3/13/2019    Procedure: COLONOSCOPY;  Surgeon: Marelyn Hammans, MD;  Location: [facility]; Service: Gastroenterology    DC ESOPHAGOGASTRODUODENOSCOPY TRANSORAL DIAGNOSTIC N/A 3/13/2019    Procedure: ESOPHAGOGASTRODUODENOSCOPY (EGD); Surgeon: Marelyn Hammans, MD;  Location: [facility];   Service: Gastroenterology    DC LAPAROSCOPY W TOT HYSTERECT UTERUS 250 GRAM OR LESS N/A 2016    Procedure: HYSTERECTOMY LAPAROSCOPIC TOTAL ([address]), with bilateral salpingectomy;  Surgeon: Cielo Lipscomb DO;  Location: [facility];  Service: Gynecology    THROMBECTOMY / ARTERIOVENOUS GRAFT REVISION      TOE SURGERY Right     removal of the 4th toe    WOUND DEBRIDEMENT Right 10/8/2021    Procedure: R 1st MTPJ washout ;  Surgeon: Elvie Kohli DPM;  Location: [facility];  Service: Podiatry       ALLERGIES:  Allergies   Allergen Reactions    Iodinated Diagnostic Agents Itching    Keflex [Cephalexin] Hives    Wound Dressing Adhesive Rash       SOCIAL HISTORY:  Social History     Substance and Sexual Activity   Alcohol Use Never    Alcohol/week: 0 0 standard drinks     Social History     Substance and Sexual Activity   Drug Use No     Social History     Tobacco Use   Smoking Status Never Smoker   Smokeless Tobacco Never Used       FAMILY HISTORY:  Family History   Problem Relation Age of Onset    Heart disease Family     Diabetes Family     Heart disease Mother     Cancer Brother         neck    Throat cancer Brother     Ovarian cancer Maternal Aunt 40       MEDICATIONS:    Current Facility-Administered Medications:     acetaminophen (TYLENOL) tablet 650 mg, 650 mg, Oral, Q6H PRN, Fatoumata Fine MD, 650 mg at 07/10/22 1212    albuterol (PROVENTIL HFA,VENTOLIN HFA) inhaler 2 puff, 2 puff, Inhalation, Q6H PRN, Fatoumata Fine MD    ALPRAZolam Forestine itar) tablet 0 25 mg, 0 25 mg, Oral, 4x Daily PRN, Fatoumata Fine MD, 0 25 mg at 07/10/22 1212    aluminum-magnesium hydroxide-simethicone (MYLANTA) oral suspension 30 mL, 30 mL, Oral, Q6H PRN, Fatoumata Fine MD    atorvastatin (LIPITOR) tablet 20 mg, 20 mg, Oral, QPM, Fatoumata Fine MD, 20 mg at 07/09/22 2220    b complex-vitamin C-folic acid (NEPHROCAPS) capsule 1 capsule, 1 capsule, Oral, Daily With Dinner, Fatoumata Fine MD    carvedilol (COREG) tablet 25 mg, 25 mg, Oral, BID, Fatoumata Fine MD, 25 mg at 07/10/22 0816   cinacalcet (SENSIPAR) tablet 30 mg, 30 mg, Oral, Daily, Myron Florian MD, 30 mg at 07/10/22 0816    dexamethasone (DECADRON) injection 6 mg, 6 mg, Intravenous, Q24H, Myron Florian MD, 6 mg at 07/09/22 2132    dicyclomine (BENTYL) capsule 10 mg, 10 mg, Oral, Q6H PRN, Myron Florian MD    docusate sodium (COLACE) capsule 100 mg, 100 mg, Oral, BID PRN, Myron Florain MD    insulin glargine (LANTUS) subcutaneous injection 15 Units 0 15 mL, 15 Units, Subcutaneous, HS, Yefri Chun MD, 15 Units at 07/09/22 2132    [START ON 7/11/2022] insulin lispro (HumaLOG) 100 units/mL subcutaneous injection 1-6 Units, 1-6 Units, Subcutaneous, 0200, LOIS Brannon    insulin lispro (HumaLOG) 100 units/mL subcutaneous injection 2-12 Units, 2-12 Units, Subcutaneous, HS, LOIS Brannon    insulin lispro (HumaLOG) 100 units/mL subcutaneous injection 2-12 Units, 2-12 Units, Subcutaneous, TID AC **AND** Fingerstick Glucose (POCT), , , TID AC, LOIS Brannon    levothyroxine tablet 50 mcg, 50 mcg, Oral, Daily, Myron Florian MD, 50 mcg at 07/10/22 0817    lidocaine (LIDODERM) 5 % patch 1 patch, 1 patch, Topical, Daily, Myron Florian MD, 1 patch at 07/10/22 0817    loratadine (CLARITIN) tablet 10 mg, 10 mg, Oral, Daily, Myron Florian MD, 10 mg at 07/10/22 0817    melatonin tablet 3 mg, 3 mg, Oral, HS, Myron Florian MD, 3 mg at 07/09/22 2131    menthol-methyl salicylate (BENGAY) 93-77 % cream, , Apply externally, 4x Daily PRN, Butch Banks PA-C    methocarbamol (ROBAXIN) tablet 500 mg, 500 mg, Oral, BID, Myron Florian MD, 500 mg at 07/10/22 0817    NIFEdipine (PROCARDIA XL) 24 hr tablet 30 mg, 30 mg, Oral, Daily, Myron Florian MD, 30 mg at 07/10/22 0817    nystatin (MYCOSTATIN) powder, , Topical, BID, Myron Florian MD, Given at 07/10/22 0835    ondansetron (ZOFRAN) injection 4 mg, 4 mg, Intravenous, Q6H PRN, Yefri Orourke Babatunde Dale MD    pantoprazole (PROTONIX) EC tablet 40 mg, 40 mg, Oral, Early Morning, Ronak Bernal MD, 40 mg at 07/10/22 0544    polyethylene glycol (MIRALAX) packet 17 g, 17 g, Oral, Daily, Ronak Bernal MD, 17 g at 07/10/22 8211    [COMPLETED] remdesivir (Veklury) 200 mg in sodium chloride 0 9 % 290 mL IVPB, 200 mg, Intravenous, Q24H, 200 mg at 07/09/22 2132 **FOLLOWED BY** remdesivir (Veklury) 100 mg in sodium chloride 0 9 % 270 mL IVPB, 100 mg, Intravenous, Q24H, Yefri Carter MD    sertraline (ZOLOFT) tablet 75 mg, 75 mg, Oral, Daily, Ronak Bernal MD, 75 mg at 07/10/22 0816    sevelamer (RENAGEL) tablet 1,600 mg, 1,600 mg, Oral, TID With Meals, Ronak Bernal MD, 1,600 mg at 07/10/22 1212    sodium polystyrene sulfonate (KAYEXALATE) oral suspension 15 g, 15 g, Oral, PRN, Ronak Bernal MD    torsemide [facility]) tablet 100 mg, 100 mg, Oral, Daily, Ronak Bernal MD, 100 mg at 07/10/22 0816    zolpidem (AMBIEN) tablet 5 mg, 5 mg, Oral, HS, Ronak Bernal MD, 5 mg at 07/09/22 2131    REVIEW OF SYSTEMS:    A 12 point review of systems was performed and was negative besides what is mentioned in HPI    OBJECTIVE:    Vitals:    07/09/22 2109 07/09/22 2233 07/09/22 2346 07/10/22 0700   BP: (!) 207/88 (!) 151/121     BP Location:       Pulse: 85 78     Resp:       Temp:   99 °F (37 2 °C)    TempSrc:       SpO2: 97%   94%   Weight:            Temp:  [99 °F (37 2 °C)-100 8 °F (38 2 °C)] 99 °F (37 2 °C)  HR:  [78-93] 78  Resp:  [20] 20  BP: (107-207)/() 151/121  SpO2:  [88 %-99 %] 94 %   Body mass index is 46 12 kg/m²  No intake/output data recorded  I/O this shift:   In: 440 [P O :440]  Out: -     Weight (last 2 days)     Date/Time Weight    07/09/22 1920 130 (285 72)    07/09/22 1854 128 (282 19)           Physical exam:    Patient was seen through glass door in for source control physical exam was discussed with patient's nurse    General: no acute distress, cooperative  Eyes: conjunctivae pink, anicteric sclerae  ENT:  Normal external ear oral mucosa moist  Neck: ROM intact, no JVD  Chest: No respiratory distress, no accessory muscle use  CVS: normal rate on telemetry  Abdomen: soft, non-tender, non-distended, normoactive bowel sounds  Extremities: no edema of both legs  Skin: no rash    Invasive Devices:      Lab Results:   Results from last 7 days   Lab Units 07/09/22 1957 07/09/22  1932   WBC Thousand/uL 5 83  --    HEMOGLOBIN g/dL 9 0*  --    I STAT HEMOGLOBIN g/dl  --  8 5*   HEMATOCRIT % 27 1*  --    HEMATOCRIT, ISTAT %  --  25*   PLATELETS Thousands/uL 137*  --    POTASSIUM mmol/L 5 8*  --    CHLORIDE mmol/L 99*  --    CO2 mmol/L 30  --    CO2, I-STAT mmol/L  --  33*   BUN mg/dL 58*  --    CREATININE mg/dL 7 44*  --    CALCIUM mg/dL 8 2*  --    MAGNESIUM mg/dL 1 9  --    PHOSPHORUS mg/dL 5 3*  --    GLUCOSE, ISTAT mg/dl  --  319*         Portions of the record may have been created with voice recognition software  Occasional wrong word or "sound a like" substitutions may have occurred due to the inherent limitations of voice recognition software  Read the chart carefully and recognize, using context, where substitutions have occurred  If you have any questions, please contact the dictating provider

## 2022-07-10 NOTE — ASSESSMENT & PLAN NOTE
Lab Results   Component Value Date    HGBA1C 7 9 (H) 09/30/2021     On Lantus and insulin sliding scale  Accu-Cheks per protocol      Blood Sugar Average: Last 72 hrs:

## 2022-07-11 LAB
ALBUMIN SERPL BCP-MCNC: 2.6 G/DL (ref 3.5–5)
ALP SERPL-CCNC: 133 U/L (ref 46–116)
ALT SERPL W P-5'-P-CCNC: 17 U/L (ref 12–78)
ANION GAP SERPL CALCULATED.3IONS-SCNC: 10 MMOL/L (ref 4–13)
AST SERPL W P-5'-P-CCNC: 11 U/L (ref 5–45)
BILIRUB SERPL-MCNC: 0.44 MG/DL (ref 0.2–1)
BUN SERPL-MCNC: 86 MG/DL (ref 5–25)
CALCIUM ALBUM COR SERPL-MCNC: 9.2 MG/DL (ref 8.3–10.1)
CALCIUM SERPL-MCNC: 8.1 MG/DL (ref 8.3–10.1)
CHLORIDE SERPL-SCNC: 101 MMOL/L (ref 100–108)
CO2 SERPL-SCNC: 23 MMOL/L (ref 21–32)
CREAT SERPL-MCNC: 10.1 MG/DL (ref 0.6–1.3)
GFR SERPL CREATININE-BSD FRML MDRD: 3 ML/MIN/1.73SQ M
GLUCOSE SERPL-MCNC: 132 MG/DL (ref 65–140)
GLUCOSE SERPL-MCNC: 142 MG/DL (ref 65–140)
GLUCOSE SERPL-MCNC: 153 MG/DL (ref 65–140)
GLUCOSE SERPL-MCNC: 200 MG/DL (ref 65–140)
GLUCOSE SERPL-MCNC: 220 MG/DL (ref 65–140)
GLUCOSE SERPL-MCNC: 224 MG/DL (ref 65–140)
GLUCOSE SERPL-MCNC: 244 MG/DL (ref 65–140)
GLUCOSE SERPL-MCNC: 27 MG/DL (ref 65–140)
GLUCOSE SERPL-MCNC: 318 MG/DL (ref 65–140)
GLUCOSE SERPL-MCNC: 324 MG/DL (ref 65–140)
GLUCOSE SERPL-MCNC: 355 MG/DL (ref 65–140)
GLUCOSE SERPL-MCNC: 72 MG/DL (ref 65–140)
GLUCOSE SERPL-MCNC: >500 MG/DL (ref 65–140)
INR PPP: 2.28 (ref 0.84–1.19)
POTASSIUM SERPL-SCNC: 5.7 MMOL/L (ref 3.5–5.3)
PROT SERPL-MCNC: 6.2 G/DL (ref 6.4–8.2)
PROTHROMBIN TIME: 24 SECONDS (ref 11.6–14.5)
SODIUM SERPL-SCNC: 134 MMOL/L (ref 136–145)

## 2022-07-11 PROCEDURE — 99232 SBSQ HOSP IP/OBS MODERATE 35: CPT | Performed by: NURSE PRACTITIONER

## 2022-07-11 PROCEDURE — 99222 1ST HOSP IP/OBS MODERATE 55: CPT | Performed by: INTERNAL MEDICINE

## 2022-07-11 PROCEDURE — 85610 PROTHROMBIN TIME: CPT | Performed by: NURSE PRACTITIONER

## 2022-07-11 PROCEDURE — 80053 COMPREHEN METABOLIC PANEL: CPT | Performed by: NURSE PRACTITIONER

## 2022-07-11 PROCEDURE — 5A1D70Z PERFORMANCE OF URINARY FILTRATION, INTERMITTENT, LESS THAN 6 HOURS PER DAY: ICD-10-PCS | Performed by: INTERNAL MEDICINE

## 2022-07-11 PROCEDURE — 82948 REAGENT STRIP/BLOOD GLUCOSE: CPT

## 2022-07-11 RX ORDER — LOPERAMIDE HYDROCHLORIDE 2 MG/1
2 CAPSULE ORAL ONCE
Status: COMPLETED | OUTPATIENT
Start: 2022-07-11 | End: 2022-07-11

## 2022-07-11 RX ORDER — WARFARIN SODIUM 3 MG/1
9 TABLET ORAL
Status: DISCONTINUED | OUTPATIENT
Start: 2022-07-12 | End: 2022-07-13

## 2022-07-11 RX ORDER — DEXTROSE MONOHYDRATE 25 G/50ML
INJECTION, SOLUTION INTRAVENOUS
Status: COMPLETED
Start: 2022-07-11 | End: 2022-07-11

## 2022-07-11 RX ORDER — DEXAMETHASONE SODIUM PHOSPHATE 4 MG/ML
6 INJECTION, SOLUTION INTRA-ARTICULAR; INTRALESIONAL; INTRAMUSCULAR; INTRAVENOUS; SOFT TISSUE EVERY 24 HOURS
Status: DISCONTINUED | OUTPATIENT
Start: 2022-07-12 | End: 2022-07-12

## 2022-07-11 RX ORDER — DEXTROSE MONOHYDRATE 25 G/50ML
25 INJECTION, SOLUTION INTRAVENOUS ONCE
Status: COMPLETED | OUTPATIENT
Start: 2022-07-11 | End: 2022-07-11

## 2022-07-11 RX ADMIN — ALPRAZOLAM 0.25 MG: 0.25 TABLET ORAL at 22:04

## 2022-07-11 RX ADMIN — SEVELAMER HYDROCHLORIDE 2400 MG: 800 TABLET, FILM COATED PARENTERAL at 17:11

## 2022-07-11 RX ADMIN — ATORVASTATIN CALCIUM 20 MG: 20 TABLET, FILM COATED ORAL at 17:11

## 2022-07-11 RX ADMIN — NIFEDIPINE 30 MG: 30 TABLET, FILM COATED, EXTENDED RELEASE ORAL at 09:33

## 2022-07-11 RX ADMIN — CARVEDILOL 25 MG: 25 TABLET, FILM COATED ORAL at 17:11

## 2022-07-11 RX ADMIN — ACETAMINOPHEN 650 MG: 325 TABLET, FILM COATED ORAL at 03:31

## 2022-07-11 RX ADMIN — NYSTATIN: 100000 POWDER TOPICAL at 10:00

## 2022-07-11 RX ADMIN — LORATADINE 10 MG: 10 TABLET ORAL at 09:33

## 2022-07-11 RX ADMIN — LEVOTHYROXINE SODIUM 50 MCG: 50 TABLET ORAL at 09:33

## 2022-07-11 RX ADMIN — METHOCARBAMOL 500 MG: 500 TABLET ORAL at 17:11

## 2022-07-11 RX ADMIN — Medication 2000 UNITS: at 09:33

## 2022-07-11 RX ADMIN — CINACALCET 30 MG: 30 TABLET, FILM COATED ORAL at 09:38

## 2022-07-11 RX ADMIN — SODIUM CHLORIDE 5 UNITS/HR: 9 INJECTION, SOLUTION INTRAVENOUS at 17:16

## 2022-07-11 RX ADMIN — TORSEMIDE 100 MG: 20 TABLET ORAL at 09:33

## 2022-07-11 RX ADMIN — DEXTROSE MONOHYDRATE 25 ML: 25 INJECTION, SOLUTION INTRAVENOUS at 10:07

## 2022-07-11 RX ADMIN — REMDESIVIR 100 MG: 100 INJECTION, POWDER, LYOPHILIZED, FOR SOLUTION INTRAVENOUS at 20:04

## 2022-07-11 RX ADMIN — INSULIN LISPRO 6 UNITS: 100 INJECTION, SOLUTION INTRAVENOUS; SUBCUTANEOUS at 01:52

## 2022-07-11 RX ADMIN — DEXTROSE MONOHYDRATE 25 ML: 25 INJECTION, SOLUTION INTRAVENOUS at 09:58

## 2022-07-11 RX ADMIN — SERTRALINE HYDROCHLORIDE 75 MG: 50 TABLET ORAL at 09:33

## 2022-07-11 RX ADMIN — LOPERAMIDE HYDROCHLORIDE 2 MG: 2 CAPSULE ORAL at 14:29

## 2022-07-11 RX ADMIN — POLYETHYLENE GLYCOL 3350 17 G: 17 POWDER, FOR SOLUTION ORAL at 09:32

## 2022-07-11 RX ADMIN — CALCITRIOL CAPSULES 0.25 MCG 0.25 MCG: 0.25 CAPSULE ORAL at 09:33

## 2022-07-11 RX ADMIN — METHOCARBAMOL 500 MG: 500 TABLET ORAL at 09:32

## 2022-07-11 RX ADMIN — ACETAMINOPHEN 650 MG: 325 TABLET, FILM COATED ORAL at 17:11

## 2022-07-11 RX ADMIN — SODIUM CHLORIDE 12 UNITS/HR: 9 INJECTION, SOLUTION INTRAVENOUS at 03:26

## 2022-07-11 RX ADMIN — PANTOPRAZOLE SODIUM 40 MG: 40 TABLET, DELAYED RELEASE ORAL at 05:23

## 2022-07-11 RX ADMIN — Medication 1 CAPSULE: at 17:11

## 2022-07-11 RX ADMIN — Medication 3 MG: at 22:04

## 2022-07-11 RX ADMIN — SEVELAMER HYDROCHLORIDE 2400 MG: 800 TABLET, FILM COATED PARENTERAL at 09:36

## 2022-07-11 RX ADMIN — LIDOCAINE 5% 1 PATCH: 700 PATCH TOPICAL at 10:55

## 2022-07-11 RX ADMIN — CARVEDILOL 25 MG: 25 TABLET, FILM COATED ORAL at 09:33

## 2022-07-11 RX ADMIN — WARFARIN SODIUM 3 MG: 3 TABLET ORAL at 17:11

## 2022-07-11 RX ADMIN — MENTHOL, METHYL SALICYLATE: 10; 15 CREAM TOPICAL at 03:34

## 2022-07-11 RX ADMIN — NYSTATIN: 100000 POWDER TOPICAL at 17:12

## 2022-07-11 RX ADMIN — ZOLPIDEM TARTRATE 5 MG: 5 TABLET, COATED ORAL at 22:04

## 2022-07-11 NOTE — ASSESSMENT & PLAN NOTE
Last evening mildly hemolyzed but treated with kaexylate   · Pt now with diarrhea   · Requesting immodium will order one x dose   · Pt unhappy at this time feels sob and not happy she has covid   · Will require hemo appreciate nephrology

## 2022-07-11 NOTE — CASE MANAGEMENT
Case Management Discharge Planning Note    Patient name Damion Thompson  [facility] [id number]  : 1965 Date 2022       Current Admission Date: 2022  Current Admission Diagnosis:COVID-19   Patient Active Problem List    Diagnosis Date Noted    Fall 2022    Hypoxia 2022    COVID-19 2022    Right knee pain 2022    Constipation 2022    Pruritus 2021    Postop check 2021    Sigmoid diverticulitis 2021    Pneumonia 2021    Chronic anticoagulation 2021    Essential hypertension 2021    Arteriovenous fistula for hemodialysis in place, primary ([facility] ) 2021    Healthcare-associated pneumonia 2021    Right foot pain 2021    A-V fistula ([facility] ) 2021    Chronic pain syndrome 2021    Bilateral primary osteoarthritis of knee 2021    Bilateral patellofemoral syndrome 2021    Tinea unguium 2020    S/P foot surgery, right 2020    History of claustrophobia 2020    Morbid obesity 2020    Hyperlipidemia 2020    Diabetic polyneuropathy associated with type 2 diabetes mellitus [facility] 2020    Encounter for diabetic foot exam ([facility] ) 2020    Corns and callosities 2020    History of transmetatarsal amputation of right foot ([facility] ) 2020    Flu-like symptoms 2020    Lumbar radiculopathy 2020    Low back pain with sciatica 2020    Hemodialysis-associated hypotension 2019    Hyponatremia 2019    Parenchymal renal hypertension 2019    Candidiasis of breast 2019    Hyperkalemia 2019    Anemia of chronic disease 2019    Disruption of internal surgical wound 2019    Dyspnea 2019    Secondary hyperparathyroidism of renal origin ([facility] ) 2019    Nausea and vomiting 2019    Meningioma ([facility] 2019    Concussion without loss of consciousness 03/15/2019    Colon cancer screening 03/05/2019    Gastroesophageal reflux disease 03/05/2019    Primary osteoarthritis of right knee 10/25/2018    Hemodialysis status ([facility] ) 10/23/2018    Knee pain 10/22/2018    Ambulatory dysfunction 10/22/2018    Anxiety disorder 04/06/2017    Acquired hypothyroidism 07/22/2016    Glaucoma 07/01/2016    ESRD on hemodialysis 07/01/2016    Abnormal uterine bleeding 02/15/2016    History of venous thromboembolism 02/14/2016    Pul[facility] embolus [facility] 10/30/2015    Cervical dysplasia 05/03/2015    Chronic endometritis 10/17/2014    Tinea pedis 10/02/2014    Benign neoplasm of skin 09/25/2014    Type 2 diabetes mellitus ([facility] ) 09/04/2014    Phlebitis and thrombophlebitis of superficial vessels of lower extremities 04/10/2014    Limb pain 11/25/2013    Mononeuritis of upper limb, unspecified laterality 11/25/2013    Legal blindness, as defined in [facility] 44/96/7248    Complication from renal dialysis device 04/22/2013    Essential hypertension 10/15/2012    Cardiomyopathy ([facility] ) 09/26/2012    Esophageal reflux 08/20/2012    Allergic rhinitis 08/20/2012      LOS (days): 2  Geometric Mean LOS (GMLOS) (days): 5 40  Days to GMLOS:3 6     OBJECTIVE:  Risk of Unplanned Readmission Score: 57 77         Current admission status: Inpatient   Preferred Pharmacy:   [facility]/pharmacy #[id number] - [address] [facility] [address] [address]  [address] [place] [address]  Phone: [phone] Fax: [phone] [facility], [facility] [address]  [address] [facility] [address]  Phone: [phone] Fax: [phone]    Primary Care Provider: Gracia Rodriguez MD    Primary Insurance: MEDICARE  Secondary Insurance: [facility] [place]    DISCHARGE DETAILS:       Additional Comments: Patient is Covid 23 + so CM attempted to talk to the patient via hospital phone with no success    RN spoke to patient who said to have CM talk to her at a different time    CM to be available

## 2022-07-11 NOTE — PROGRESS NOTES
Patient's Q2 hr blood sugar check was 30  Monroy Settler CRNP with [facility] notified  Insulin gtt stopped, blood sugar repeated on opposite hand was 27 and 25 ml 50% dextrose given  Porfirio Sotor to see patient at bedside  Additional 25 ml 50% dextrose given with provider at bedside  Provider at bedside with this nurse giving verbal orders to recheck blood glucose  Patients glucose up to 100, then 220  Verbal orders to start insulin drip on Algorithm 1 going based off of the 220 blood sugar  Insulin drip started on algorithm 1 at 3u/hr  Orders to recheck glucose in one hour  Patient awake, alert and oriented and "feeling better" at this time  Call bell given, patients room phone given and instructed to call the nurses phone if feeling poor  Patient agreeable

## 2022-07-11 NOTE — QUICK NOTE
Patient's hyperkalemia of K 5 7 noted  Most likely d/t hyperglycemia as blood glucose 355 which is likely contributing  Plan for next lab check and HD in am  Recommend glycemic control

## 2022-07-11 NOTE — ASSESSMENT & PLAN NOTE
Lab Results   Component Value Date    HGBA1C 9 4 (H) 07/09/2022       Recent Labs     07/09/22  2250 07/10/22  0826 07/10/22  1219 07/10/22  1746   POCGLU 261* 490* 51* 296*       Blood Sugar Average: Last 72 hrs:  (P) 274 5   · Uncontrolled and labile in setting of poorly controlled diabetic on iv steroids for covid   · Increased sliding scale to 3 added bedtime and 2am chk dt her being labile   · May need to reduce level but will continue for now noting lunch time drop

## 2022-07-11 NOTE — CONSULTS
TeleConsultation - Endocrine  Arsalan Ambrosio 56 y o  female MRN: [id number]  Unit/Bed#: [facility] 630-01 Encounter: [id number]      REQUIRED DOCUMENTATION:     1  This service was provided via Telemedicine  2  Provider located at [facility]  3  TeleMed provider: Derrell Rubio DO   4  Identify all parties in room with patient during tele consult:  none  5  Patient was then informed that this was a Telemedicine visit and that the exam was being conducted confidentially over secure lines  My office door was closed  No one else was in the room  Patient acknowledged consent and understanding of privacy and security of the Telemedicine visit, and gave us permission to have the assistant stay in the room in order to assist with the history and to conduct the exam   I informed the patient that I have reviewed their record in Epic and presented the opportunity for them to ask any questions regarding the visit today  The patient agreed to participate  Assessment/Plan     Type 2 DM with steroid induced hyperglycemia  Most recent a1c 9 4 this admission  Started on steroids dexamethasone 6mg qD on 7/9  Home regimen: 15 lantus at bedtime, correctional novolog with meals starting around blood sugar 180  Inpatient regimen: insulin infusion  Continue insulin infusion to evaluate 24h requirements  Hypothyroidism  On levothyroxine 50mcg as outpatient  Recheck TFTs as outpatient, no recent labwork available  History of Present Illness   HPI: HPI: Arsalan Ambrosio is a 56y o  year old female with type 2 DM hospitalized for COVID who presented after fall after hemodialysis  Endocrinology is consulted for type 2 DM and steroid induced hyperglycemia  She reports a history of T2DM since age 34, with insulin treatment for last 15 years  Diabetes is complicated by retinopathy, ESRD on HD, R foot TMA  This AM pt had fingerstick of 30 in one hand, followed by recheck of 27 in other hand   Pt reports feeling out of it, nursing reports pt had mental status changes including sleepiness and felt cold/clammy  Pt was treated with 25mL D50x2 and insulin infusion was held  Recheck of blood sugar was 220, insulin infusion was resumed at lower rate  Inpatient consult to Endocrinology  Consult performed by: Andreas Russell DO  Consult ordered by: LOIS Rasheed          Review of Systems   Constitutional: Negative for chills, fatigue and fever  HENT: Negative for rhinorrhea and sore throat  Respiratory: Negative for choking, chest tightness, shortness of breath, wheezing and stridor  Cardiovascular: Negative for chest pain, palpitations and leg swelling  Gastrointestinal: Negative for abdominal pain, blood in stool, constipation, diarrhea, nausea and vomiting  Genitourinary: Negative for hematuria  Neurological: Negative for dizziness and light-headedness  All other systems reviewed and are negative        Historical Information   Past Medical History:   Diagnosis Date    Abnormal uterine bleeding (AUB)     Anemia     Anxiety     Arthritis     Chronic kidney disease     Claustrophobia     [facility]     Disease of thyroid gland     DVT (deep venous thrombosis) (HCC)     End stage kidney disease (HCC)     Foot ulcer due to secondary DM ([facility]     Hemodialysis patient ([facility]     Tues, Thurs, Sat    Hypertension     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[facility] )     right eye    Morbid obesity ([facility]     Osteomyelitis of fifth toe of right foot ([facility]     Panic attacks     Pulmonary embolism (HCC)     Reflux esophagitis     Thrombophlebitis of saphenous vein     Warfarin anticoagulation      Past Surgical History:   Procedure Laterality Date    AMPUTATION      r 4th toe    ARTERIOVENOUS GRAFT PLACEMENT      CATARACT EXTRACTION Bilateral     CERVICAL BIOPSY  W/ LOOP ELECTRODE EXCISION       SECTION      DIALYSIS FISTULA CREATION      DILATION AND CURETTAGE OF UTERUS      ENDOMETRIAL ABLATION W/ NOVASURE      EYE SURGERY      HYSTERECTOMY      @ age 57 (complete)    IR AV FISTULAGRAM/GRAFTOGRAM  10/11/2019    IR AV FISTULAGRAM/GRAFTOGRAM  8/12/2020    IR AV FISTULAGRAM/GRAFTOGRAM  12/23/2020    IR NON-TUNNELED CENTRAL LINE PLACEMENT  6/29/2021    IR NON-TUNNELED CENTRAL LINE PLACEMENT  10/4/2021    OOPHORECTOMY Bilateral     @ age 57    [address] Right 12/26/2021    Procedure: AMPUTATION TRANSMETATARSAL (TMA);  Surgeon: Ced Smith DPM;  [facility];  Service: Podiatry    GA AMPUTATION TOE,MT-P JT Right 5/28/2020    Procedure: AMPUTATION TOE- 5th;  Surgeon: Ced Smith DPM;  [facility];  Service: Podiatry    GA COLONOSCOPY FLX DX W/COLLJ [address] PFRMD N/A 3/13/2019    Procedure: COLONOSCOPY;  Surgeon: Roberta Alejandro MD;  Location: [facility]; Service: Gastroenterology    GA ESOPHAGOGASTRODUODENOSCOPY TRANSORAL DIAGNOSTIC N/A 3/13/2019    Procedure: ESOPHAGOGASTRODUODENOSCOPY (EGD); Surgeon: Roberta Alejandro MD;  [facility];   Service: Gastroenterology    GA LAPAROSCOPY W TOT HYSTERECT UTERUS 250 GRAM OR LESS N/A 2/16/2016    Procedure: HYSTERECTOMY LAPAROSCOPIC TOTAL [facility]), with bilateral salpingectomy;  Surgeon: Jovanni Hernandez DO;  [facility];  Service: Gynecology    THROMBECTOMY / ARTERIOVENOUS GRAFT REVISION      TOE SURGERY Right     removal of the 4th toe    WOUND DEBRIDEMENT Right 10/8/2021    Procedure: R 1st MTPJ washout ;  Surgeon: Noble Krishnan DPM;  [facility];  Service: Podiatry     Social History   Social History     Substance and Sexual Activity   Alcohol Use Never    Alcohol/week: 0 0 standard drinks     Social History     Substance and Sexual Activity   Drug Use No     E-Cigarette/Vaping    E-Cigarette Use Never User      E-Cigarette/Vaping Substances    Nicotine No     THC No     CBD No     Flavoring No     Other No     Unknown No      Social History     Tobacco Use Smoking Status Never Smoker   Smokeless Tobacco Never Used     Family History: non-contributory    Meds/Allergies   all current active meds have been reviewed  Allergies   Allergen Reactions    Iodinated Diagnostic Agents Itching    Keflex [Cephalexin] Hives    Wound Dressing Adhesive Rash       Objective   Vitals: Blood pressure 162/59, pulse 68, temperature 97 8 °F (36 6 °C), resp  rate 16, weight 130 kg (285 lb 11 5 oz), last menstrual period 02/12/2016, SpO2 95 %, not currently breastfeeding  Intake/Output Summary (Last 24 hours) at 7/11/2022 1423  Last data filed at 7/10/2022 2159  Gross per 24 hour   Intake 270 ml   Output --   Net 270 ml     Invasive Devices  Report    Peripheral Intravenous Line  Duration           Peripheral IV 07/09/22 Right Forearm 1 day          Line  Duration           Hemodialysis AV Fistula 02/20/18 Left Forearm 1601 days              Physical Exam not performed today as pt is on COVID 19 precautions    Lab Results: I have personally reviewed pertinent reports  Imaging Studies: I have personally reviewed pertinent reports  EKG, Pathology, and Other Studies: I have personally reviewed pertinent reports  Code Status: Level 1 - Full Code  Advance Directive and Living Will:      Power of :    POLST:      Counseling / Coordination of Care  Total floor / unit time spent today 35 minutes  Greater than 50% of total time was spent with the patient and / or family counseling and / or coordination of care

## 2022-07-12 ENCOUNTER — APPOINTMENT (INPATIENT)
Dept: RADIOLOGY | Facility: HOSPITAL | Age: 57
DRG: 177 | End: 2022-07-12
Payer: MEDICARE

## 2022-07-12 ENCOUNTER — APPOINTMENT (INPATIENT)
Dept: DIALYSIS | Facility: HOSPITAL | Age: 57
DRG: 177 | End: 2022-07-12
Attending: INTERNAL MEDICINE
Payer: MEDICARE

## 2022-07-12 PROBLEM — Z78.9 PROBLEM WITH VASCULAR ACCESS: Status: ACTIVE | Noted: 2022-07-12

## 2022-07-12 PROBLEM — M46.46 DISCITIS OF LUMBAR REGION: Status: ACTIVE | Noted: 2022-07-12

## 2022-07-12 LAB
ANION GAP SERPL CALCULATED.3IONS-SCNC: 13 MMOL/L (ref 4–13)
BASOPHILS # BLD AUTO: 0.01 THOUSANDS/ΜL (ref 0–0.1)
BASOPHILS NFR BLD AUTO: 0 % (ref 0–1)
BUN SERPL-MCNC: 94 MG/DL (ref 5–25)
CALCIUM SERPL-MCNC: 7.8 MG/DL (ref 8.3–10.1)
CHLORIDE SERPL-SCNC: 102 MMOL/L (ref 100–108)
CO2 SERPL-SCNC: 20 MMOL/L (ref 21–32)
CREAT SERPL-MCNC: 11.7 MG/DL (ref 0.6–1.3)
CRP SERPL QL: 23.9 MG/L
EOSINOPHIL # BLD AUTO: 0.05 THOUSAND/ΜL (ref 0–0.61)
EOSINOPHIL NFR BLD AUTO: 1 % (ref 0–6)
ERYTHROCYTE [DISTWIDTH] IN BLOOD BY AUTOMATED COUNT: 13.2 % (ref 11.6–15.1)
ERYTHROCYTE [SEDIMENTATION RATE] IN BLOOD: 26 MM/HOUR (ref 0–29)
GFR SERPL CREATININE-BSD FRML MDRD: 3 ML/MIN/1.73SQ M
GLUCOSE SERPL-MCNC: 138 MG/DL (ref 65–140)
GLUCOSE SERPL-MCNC: 139 MG/DL (ref 65–140)
GLUCOSE SERPL-MCNC: 143 MG/DL (ref 65–140)
GLUCOSE SERPL-MCNC: 155 MG/DL (ref 65–140)
GLUCOSE SERPL-MCNC: 156 MG/DL (ref 65–140)
GLUCOSE SERPL-MCNC: 259 MG/DL (ref 65–140)
GLUCOSE SERPL-MCNC: 48 MG/DL (ref 65–140)
GLUCOSE SERPL-MCNC: 65 MG/DL (ref 65–140)
GLUCOSE SERPL-MCNC: 67 MG/DL (ref 65–140)
GLUCOSE SERPL-MCNC: 70 MG/DL (ref 65–140)
GLUCOSE SERPL-MCNC: 73 MG/DL (ref 65–140)
GLUCOSE SERPL-MCNC: 81 MG/DL (ref 65–140)
HCT VFR BLD AUTO: 27 % (ref 34.8–46.1)
HGB BLD-MCNC: 8.6 G/DL (ref 11.5–15.4)
IMM GRANULOCYTES # BLD AUTO: 0.02 THOUSAND/UL (ref 0–0.2)
IMM GRANULOCYTES NFR BLD AUTO: 0 % (ref 0–2)
INR PPP: 2 (ref 0.84–1.19)
LYMPHOCYTES # BLD AUTO: 1.14 THOUSANDS/ΜL (ref 0.6–4.47)
LYMPHOCYTES NFR BLD AUTO: 20 % (ref 14–44)
MCH RBC QN AUTO: 31 PG (ref 26.8–34.3)
MCHC RBC AUTO-ENTMCNC: 31.9 G/DL (ref 31.4–37.4)
MCV RBC AUTO: 98 FL (ref 82–98)
MONOCYTES # BLD AUTO: 0.6 THOUSAND/ΜL (ref 0.17–1.22)
MONOCYTES NFR BLD AUTO: 10 % (ref 4–12)
NEUTROPHILS # BLD AUTO: 3.96 THOUSANDS/ΜL (ref 1.85–7.62)
NEUTS SEG NFR BLD AUTO: 69 % (ref 43–75)
NRBC BLD AUTO-RTO: 0 /100 WBCS
PLATELET # BLD AUTO: 135 THOUSANDS/UL (ref 149–390)
PMV BLD AUTO: 11.3 FL (ref 8.9–12.7)
POTASSIUM SERPL-SCNC: 5.5 MMOL/L (ref 3.5–5.3)
PROCALCITONIN SERPL-MCNC: 0.47 NG/ML
PROTHROMBIN TIME: 21.7 SECONDS (ref 11.6–14.5)
RBC # BLD AUTO: 2.77 MILLION/UL (ref 3.81–5.12)
SODIUM SERPL-SCNC: 135 MMOL/L (ref 136–145)
WBC # BLD AUTO: 5.78 THOUSAND/UL (ref 4.31–10.16)

## 2022-07-12 PROCEDURE — 85652 RBC SED RATE AUTOMATED: CPT | Performed by: PHYSICIAN ASSISTANT

## 2022-07-12 PROCEDURE — 82948 REAGENT STRIP/BLOOD GLUCOSE: CPT

## 2022-07-12 PROCEDURE — 99448 NTRPROF PH1/NTRNET/EHR 21-30: CPT

## 2022-07-12 PROCEDURE — 85610 PROTHROMBIN TIME: CPT | Performed by: PHYSICIAN ASSISTANT

## 2022-07-12 PROCEDURE — 99232 SBSQ HOSP IP/OBS MODERATE 35: CPT | Performed by: INTERNAL MEDICINE

## 2022-07-12 PROCEDURE — 87081 CULTURE SCREEN ONLY: CPT | Performed by: PHYSICIAN ASSISTANT

## 2022-07-12 PROCEDURE — 84145 PROCALCITONIN (PCT): CPT | Performed by: PHYSICIAN ASSISTANT

## 2022-07-12 PROCEDURE — 80048 BASIC METABOLIC PNL TOTAL CA: CPT | Performed by: INTERNAL MEDICINE

## 2022-07-12 PROCEDURE — 86140 C-REACTIVE PROTEIN: CPT | Performed by: PHYSICIAN ASSISTANT

## 2022-07-12 PROCEDURE — 87040 BLOOD CULTURE FOR BACTERIA: CPT | Performed by: PHYSICIAN ASSISTANT

## 2022-07-12 PROCEDURE — G1004 CDSM NDSC: HCPCS

## 2022-07-12 PROCEDURE — 72148 MRI LUMBAR SPINE W/O DYE: CPT

## 2022-07-12 PROCEDURE — 85025 COMPLETE CBC W/AUTO DIFF WBC: CPT | Performed by: INTERNAL MEDICINE

## 2022-07-12 PROCEDURE — 99223 1ST HOSP IP/OBS HIGH 75: CPT | Performed by: PHYSICIAN ASSISTANT

## 2022-07-12 PROCEDURE — 99223 1ST HOSP IP/OBS HIGH 75: CPT | Performed by: INTERNAL MEDICINE

## 2022-07-12 PROCEDURE — 99232 SBSQ HOSP IP/OBS MODERATE 35: CPT | Performed by: PHYSICIAN ASSISTANT

## 2022-07-12 RX ORDER — INSULIN GLARGINE 100 [IU]/ML
15 INJECTION, SOLUTION SUBCUTANEOUS
Status: DISCONTINUED | OUTPATIENT
Start: 2022-07-12 | End: 2022-07-13

## 2022-07-12 RX ORDER — OXYCODONE HYDROCHLORIDE 5 MG/1
5 TABLET ORAL EVERY 4 HOURS PRN
Status: DISCONTINUED | OUTPATIENT
Start: 2022-07-12 | End: 2022-07-14 | Stop reason: HOSPADM

## 2022-07-12 RX ORDER — DEXTROSE MONOHYDRATE 25 G/50ML
INJECTION, SOLUTION INTRAVENOUS
Status: COMPLETED
Start: 2022-07-12 | End: 2022-07-12

## 2022-07-12 RX ORDER — INSULIN LISPRO 100 [IU]/ML
2-12 INJECTION, SOLUTION INTRAVENOUS; SUBCUTANEOUS
Status: DISCONTINUED | OUTPATIENT
Start: 2022-07-12 | End: 2022-07-13

## 2022-07-12 RX ADMIN — METHOCARBAMOL 500 MG: 500 TABLET ORAL at 08:25

## 2022-07-12 RX ADMIN — Medication 2000 UNITS: at 08:26

## 2022-07-12 RX ADMIN — NIFEDIPINE 30 MG: 30 TABLET, FILM COATED, EXTENDED RELEASE ORAL at 08:26

## 2022-07-12 RX ADMIN — CARVEDILOL 25 MG: 25 TABLET, FILM COATED ORAL at 17:06

## 2022-07-12 RX ADMIN — METHOCARBAMOL 500 MG: 500 TABLET ORAL at 17:04

## 2022-07-12 RX ADMIN — Medication 12 G: at 07:43

## 2022-07-12 RX ADMIN — Medication 4 G: at 06:51

## 2022-07-12 RX ADMIN — POLYETHYLENE GLYCOL 3350 17 G: 17 POWDER, FOR SOLUTION ORAL at 08:29

## 2022-07-12 RX ADMIN — PANTOPRAZOLE SODIUM 40 MG: 40 TABLET, DELAYED RELEASE ORAL at 06:00

## 2022-07-12 RX ADMIN — ALPRAZOLAM 0.25 MG: 0.25 TABLET ORAL at 14:07

## 2022-07-12 RX ADMIN — SEVELAMER HYDROCHLORIDE 2400 MG: 800 TABLET, FILM COATED PARENTERAL at 17:04

## 2022-07-12 RX ADMIN — INSULIN LISPRO 6 UNITS: 100 INJECTION, SOLUTION INTRAVENOUS; SUBCUTANEOUS at 21:12

## 2022-07-12 RX ADMIN — INSULIN GLARGINE 15 UNITS: 100 INJECTION, SOLUTION SUBCUTANEOUS at 21:13

## 2022-07-12 RX ADMIN — Medication 1 CAPSULE: at 17:04

## 2022-07-12 RX ADMIN — ZOLPIDEM TARTRATE 5 MG: 5 TABLET, COATED ORAL at 21:12

## 2022-07-12 RX ADMIN — SEVELAMER HYDROCHLORIDE 2400 MG: 800 TABLET, FILM COATED PARENTERAL at 08:25

## 2022-07-12 RX ADMIN — TORSEMIDE 100 MG: 20 TABLET ORAL at 08:25

## 2022-07-12 RX ADMIN — LORATADINE 10 MG: 10 TABLET ORAL at 08:25

## 2022-07-12 RX ADMIN — WARFARIN SODIUM 9 MG: 3 TABLET ORAL at 17:05

## 2022-07-12 RX ADMIN — CARVEDILOL 25 MG: 25 TABLET, FILM COATED ORAL at 08:26

## 2022-07-12 RX ADMIN — ONDANSETRON 4 MG: 2 INJECTION INTRAMUSCULAR; INTRAVENOUS at 10:37

## 2022-07-12 RX ADMIN — SERTRALINE HYDROCHLORIDE 75 MG: 50 TABLET ORAL at 08:26

## 2022-07-12 RX ADMIN — ACETAMINOPHEN 650 MG: 325 TABLET, FILM COATED ORAL at 22:15

## 2022-07-12 RX ADMIN — ACETAMINOPHEN 650 MG: 325 TABLET, FILM COATED ORAL at 17:04

## 2022-07-12 RX ADMIN — LIDOCAINE 5% 1 PATCH: 700 PATCH TOPICAL at 08:29

## 2022-07-12 RX ADMIN — LEVOTHYROXINE SODIUM 50 MCG: 50 TABLET ORAL at 08:26

## 2022-07-12 RX ADMIN — SEVELAMER HYDROCHLORIDE 2400 MG: 800 TABLET, FILM COATED PARENTERAL at 12:02

## 2022-07-12 RX ADMIN — NYSTATIN: 100000 POWDER TOPICAL at 08:29

## 2022-07-12 RX ADMIN — OXYCODONE HYDROCHLORIDE 5 MG: 5 TABLET ORAL at 21:54

## 2022-07-12 RX ADMIN — DEXTROSE MONOHYDRATE 50 ML: 25 INJECTION, SOLUTION INTRAVENOUS at 06:23

## 2022-07-12 RX ADMIN — ACETAMINOPHEN 650 MG: 325 TABLET, FILM COATED ORAL at 03:36

## 2022-07-12 RX ADMIN — Medication 3 MG: at 21:12

## 2022-07-12 RX ADMIN — REMDESIVIR 100 MG: 100 INJECTION, POWDER, LYOPHILIZED, FOR SOLUTION INTRAVENOUS at 20:41

## 2022-07-12 RX ADMIN — CINACALCET 30 MG: 30 TABLET, FILM COATED ORAL at 08:26

## 2022-07-12 RX ADMIN — CALCITRIOL CAPSULES 0.25 MCG 0.25 MCG: 0.25 CAPSULE ORAL at 08:26

## 2022-07-12 RX ADMIN — ATORVASTATIN CALCIUM 20 MG: 20 TABLET, FILM COATED ORAL at 17:04

## 2022-07-12 NOTE — PROGRESS NOTES
NEPHROLOGY PROGRESS NOTE   Navneet Real 56 y o  female MRN: [id number]  Unit/Bed#: [facility] 630-01 Encounter: [id number]      ASSESSMENT/PLAN:  1  End-stage renal disease:  Hemodialysis Tuesday, Thursday and Saturday at [address]  2  Hyperkalemia:  Will dialyze today on a 2K bath  Low-potassium diet  3  Hypertension:  4  Anemia of CKD:  Continue Venofer 50 mg weekly  Not on Epogen due to history of PE/DVT  5  Mineral bone disease of CKD:  Continue calcitriol 0 25 mcg 3 times a week, Sensipar 30 mg daily, Renvela powder 3 times daily with meals at home but on Renagel 3 tabs t i d  With meals well admitted  6  Status post fall  7  COVID-19 pneumonia  8  Access:  Left upper extremity AV fistula    Plan Summary:    Dialysis today    Low K, low phos diet     SUBJECTIVE:  Has occasional shortness of breath  Says she feels terrible overall  Also complains of a large bruise over her AV access after dialysis treatment on Saturday  OBJECTIVE:  Current Weight: Weight - Scale: 130 kg (285 lb 11 5 oz)  Vitals:    07/12/22 0748   BP: 162/63   Pulse: 62   Resp:    Temp: 97 8 °F (36 6 °C)   SpO2: 98%       Intake/Output Summary (Last 24 hours) at 7/12/2022 1032  Last data filed at 7/12/2022 0234  Gross per 24 hour   Intake 396 34 ml   Output --   Net 396 34 ml       To limit exposure to covid-19 the exam was done through the window   General:  appears comfortable and in no acute distress   Skin:  No rash  Eyes:  Sclerae anicteric, no periorbital edema   ENT:  Moist mucous membranes  Neck:  Trachea midline, symmetric   Chest:  No accessory muscle use or respiratory distress    No conversational dyspnea  CVS:  Regular rate on monitor  Abdomen:  Obese  Neuro:  Awake and alert  Psych:  Appropriate affect  Extremities: no lower extremity edema          Medications:  Scheduled Meds:  Current Facility-Administered Medications   Medication Dose Route Frequency Provider Last Rate    acetaminophen  650 mg Oral Q6H PRN Yefri Orourke Natalia Barber MD      albuterol  2 puff Inhalation Q6H PRN Anastacio Cole MD      ALPRAZolam  0 25 mg Oral 4x Daily PRN Anastacio Cole MD      aluminum-magnesium hydroxide-simethicone  30 mL Oral Q6H PRN Anastacio Cole MD      atorvastatin  20 mg Oral QPM Anastacio Cole MD      b complex-vitamin C-folic acid  1 capsule Oral Daily With Nemo Grandben, MD      calcitriol  0 25 mcg Oral Daily Mckenzie Lynn, DO      carvedilol  25 mg Oral BID Anastacio Cole MD      cholecalciferol  2,000 Units Oral Daily Mckenzie Kiss, [place]      cinacalcet  30 mg Oral Daily Anastacio Cole MD      dexamethasone  6 mg Intravenous Q24H LOIS Forde Sa      dicyclomine  10 mg Oral Q6H PRN Anastacio Cole MD      docusate sodium  100 mg Oral BID PRN Anastacio Cole MD      insulin glargine  15 Units Subcutaneous HS Tanner Richmond PA-C      insulin lispro  2-12 Units Subcutaneous 4x Daily (AC & HS) Tanner Richmond PA-C      levothyroxine  50 mcg Oral Daily Anastacio Cole MD      lidocaine  1 patch Topical Daily Anastacio Cole MD      loratadine  10 mg Oral Daily Anastacio Cole MD      melatonin  3 mg Oral HS Anastacio Cole MD      menthol-methyl salicylate   Apply externally 4x Daily PRN Susanamerica Major PA-C      methocarbamol  500 mg Oral BID Anastacio Cole MD      NIFEdipine  30 mg Oral Daily Anastacio Cole MD      nystatin   Topical BID Anastacio Cole MD      ondansetron  4 mg Intravenous Q6H PRN Anastacio Cole MD      pantoprazole  40 mg Oral Early Morning Anastacio Cole MD      polyethylene glycol  17 g Oral Daily Anastacio Cole MD      remdesivir  100 mg Intravenous Q24H Anastacio Cole MD 0 mg (07/10/22 7167)    sertraline  75 mg Oral Daily Anastacio Cole MD      sevelamer  2,400 mg Oral TID With Meals Mckenzie Lynn DO      sodium polystyrene sulfonate  15 g Oral PRN Yefri Liya Traore MD      torsemide  100 mg Oral Daily Casey Lal MD      warfarin  9 mg Oral Daily (warfarin) LOIS Murillo      zolpidem  5 mg Oral HS Casey Lal MD         PRN Meds:   acetaminophen    albuterol    ALPRAZolam    aluminum-magnesium hydroxide-simethicone    dicyclomine    docusate sodium    menthol-methyl salicylate    ondansetron    sodium polystyrene sulfonate    Laboratory Results:  Results from last 7 days   Lab Units 07/11/22  0700 07/10/22  1342 07/09/22  1957 07/09/22  1932   WBC Thousand/uL  --  5 27 5 83  --    HEMOGLOBIN g/dL  --  8 6* 9 0*  --    I STAT HEMOGLOBIN g/dl  --   --   --  8 5*   HEMATOCRIT %  --  26 3* 27 1*  --    HEMATOCRIT, ISTAT %  --   --   --  25*   PLATELETS Thousands/uL  --  149 137*  --    SODIUM mmol/L 134* 137 135*  --    POTASSIUM mmol/L 5 7* 5 5* 5 8*  --    CHLORIDE mmol/L 101 101 99*  --    CO2 mmol/L 23 27 30  --    CO2, I-STAT mmol/L  --   --   --  33*   BUN mg/dL 86* 68* 58*  --    CREATININE mg/dL 10 10* 8 83* 7 44*  --    CALCIUM mg/dL 8 1* 8 4 8 2*  --    MAGNESIUM mg/dL  --   --  1 9  --    PHOSPHORUS mg/dL  --   --  5 3*  --    GLUCOSE, ISTAT mg/dl  --   --   --  319*

## 2022-07-12 NOTE — ASSESSMENT & PLAN NOTE
Lab Results   Component Value Date    EGFR 3 07/12/2022    EGFR 3 07/11/2022    EGFR 4 07/10/2022    CREATININE 11 70 (H) 07/12/2022    CREATININE 10 10 (H) 07/11/2022    CREATININE 8 83 (H) 07/10/2022

## 2022-07-12 NOTE — ASSESSMENT & PLAN NOTE
Lab Results   Component Value Date    HGBA1C 9 4 (H) 07/09/2022       Recent Labs     07/12/22  0804 07/12/22  0835 07/12/22  0921 07/12/22  1204   POCGLU 65 70 138 143*       Blood Sugar Average: Last 72 hrs:  (P) 244 [id number]     Endocrinology following

## 2022-07-12 NOTE — ASSESSMENT & PLAN NOTE
· S/p insulin gtt due to labile sugars w/ bouts of hypoglycemia   · Endocrine consulted changed to lantus 15 units and algorithm 4 SSI

## 2022-07-12 NOTE — ASSESSMENT & PLAN NOTE
· Patient started on COVID-19 mild protocol  · Dexamethasone and remdesivir started  · - patient with uncontrolled blood sugars in setting of steroid use however patient continues to desat upon removal oxygen therefore would concern you at this time  · - endocrinology consulted  · Labs per COVID protocol    · Pt is removing as needed

## 2022-07-12 NOTE — ASSESSMENT & PLAN NOTE
Lab Results   Component Value Date    EGFR 3 07/11/2022    EGFR 4 07/10/2022    EGFR 5 07/09/2022    CREATININE 10 10 (H) 07/11/2022    CREATININE 8 83 (H) 07/10/2022    CREATININE 7 44 (H) 07/09/2022   · Nephrology consult for hemodialysis    · Labs on admission with noted hyperkalemia  · Treated with Kayexalate

## 2022-07-12 NOTE — CONSULTS
Consultation - Infectious Disease   Rosalva Segura 56 y o  female MRN: [id number]  Unit/Bed#: [facility] 630-01 Encounter: [id number]      IMPRESSION & RECOMMENDATIONS:   Impression/Recommendations: This is a 56 y o  female, with multiple medical problems including DM, ESRD on HD and chronic low back pain, came to ER on 07/09 after a fall  She was found to have mild COVID with mild hypoxia  Patient also had lumbar spine MRI with marrow edema at L4-5     1  Abnormal lumbar spine MRI with marrow edema at L4-5 with high signal in the intervertebral disc  Although these findings are not suggestive of diskitis/vertebral osteomyelitis, radiologist suggests on reading but this may be associated with early diskitis/vertebral osteomyelitis  Patient has no clinical signs suggestive of active infection, other than her COVID   ESR is normal   CRP is mildly elevated but most likely secondary to COVID  I suspect the patient's MRI changes is all secondary to mechanical reason rather than diskitis/vertebral osteomyelitis  With minimal findings of clinical diskitis/vertebral osteomyelitis, the best course of action would be monitor patient with serial MRIs and ESR  Recommend serial lumbar spine MRI and ESR  Otherwise, monitor low back pain for now  If MRI changes worsen or ESR rises, can consider lumbar spine biopsy then  2  Mild COVID, on remdesivir and previously on dexamethasone, which have been discontinued  Patient is clinically improved  She remains on low level O2 support with good O2 saturation  Patient is status post COVID vaccination x3  She has COVID exposure at home  Complete remdesivir course  Monitor respiratory symptoms  3  Chronic low back pain with symptoms suggestive of radiculopathy  She is being followed by pain management and is status post lumbar injection  Neurosurgery evaluation and follow-up  4  ESRD, on HD  5  DM, type 2, with poorly controlled hemoglobin A1c      Outpatient records reviewed in detail  Discussed with patient in detail regarding the above plan  Discussed with slim service  Thank you for this consultation  We will follow along with you  HISTORY OF PRESENT ILLNESS:  Reason for Consult:  Abnormal lumbar spine MRI  HPI: Payton Blackwell is a 56 y o  female, with multiple medical problems including DM, CM, ESRD on HD, chronic low back pain with ambulatory dysfunction, was sent to the ER on 07/09 after she fell at her outpatient HD facility  Workup did not reveal significant sequela from fall  However, patient had respiratory symptom with hypoxia  COVID PCR was positive  Patient did have [place] contacts at home  She was admitted and started on remdesivir and dexamethasone, with O2 support  Of note, patient did receive COVID vaccination x3 doses  Due to acute on chronic low back pain, lumbar spine MRI was done  This showed mild marrow edema at L4-5  Due to concern for possible vertebral osteomyelitis, we are asked to evaluate the patient  At present, patient feels better  Dyspnea is improved  Cough mild, nonproductive  Patient has chronic low back pain and has had lumbar spine injection previously, proximally 3 months ago  She has ambulatory dysfunction and walks with a cane  Patient states that she has had increasing low back pain with some radiation to her right hip recently  No fever or chills at home  REVIEW OF SYSTEMS:  A complete system-based review was done  Except for what is noted in HPI above, ROS of systems is otherwise negative      PAST MEDICAL HISTORY:  Past Medical History:   Diagnosis Date    Abnormal uterine bleeding (AUB)     Anemia     Anxiety     Arthritis     Chronic kidney disease     Claustrophobia     [facility]     Disease of thyroid gland     DVT (deep venous thrombosis) (HCC)     End stage kidney disease (HCC)     Foot ulcer due to secondary DM ([facility]     Hemodialysis patient ([facility] )     Tues, Thurs, Sat    Hypertension     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[facility]     right eye    Morbid obesity ([facility]     Osteomyelitis of fifth toe of right foot (HCC)     Panic attacks     Pulmonary embolism (HCC)     Reflux esophagitis     Thrombophlebitis of saphenous vein     Warfarin anticoagulation      Past Surgical History:   Procedure Laterality Date    AMPUTATION      r 4th toe    ARTERIOVENOUS GRAFT PLACEMENT      CATARACT EXTRACTION Bilateral     CERVICAL BIOPSY  W/ LOOP ELECTRODE EXCISION       SECTION      DIALYSIS FISTULA CREATION      DILATION AND CURETTAGE OF UTERUS      ENDOMETRIAL ABLATION W/ NOVASURE      EYE SURGERY      HYSTERECTOMY      @ age 57 (complete)    IR AV FISTULAGRAM/GRAFTOGRAM  10/11/2019    IR AV FISTULAGRAM/GRAFTOGRAM  2020    IR AV FISTULAGRAM/GRAFTOGRAM  2020    IR NON-TUNNELED CENTRAL LINE PLACEMENT  2021    IR NON-TUNNELED CENTRAL LINE PLACEMENT  10/4/2021    OOPHORECTOMY Bilateral     @ age 57    [address] [facility] Right 2021    Procedure: AMPUTATION TRANSMETATARSAL (TMA);  Surgeon: Derrek Vieira DPM;  [facility];  Service: Podiatry    CA AMPUTATION TOE,MT-P JT Right 2020    Procedure: AMPUTATION TOE- 5th;  Surgeon: Derrek Vieira DPM;  [facility];  Service: Podiatry    CA COLONOSCOPY FLX DX W/[place] [address] 1978 PFRMD N/A 3/13/2019    Procedure: COLONOSCOPY;  Surgeon: Mia De León MD;  [facility]; Service: Gastroenterology    CA ESOPHAGOGASTRODUODENOSCOPY TRANSORAL DIAGNOSTIC N/A 3/13/2019    Procedure: ESOPHAGOGASTRODUODENOSCOPY (EGD); Surgeon: Mia De León MD;  Location: [facility];   Service: Gastroenterology    CA LAPAROSCOPY W TOT HYSTERECT UTERUS 250 GRAM OR LESS N/A 2016    Procedure: HYSTERECTOMY LAPAROSCOPIC TOTAL [facility]), with bilateral salpingectomy;  Surgeon: Sandra Davidson DO;  [facility];  Service: Gynecology   Tree Davidson / ARTERIOVENOUS GRAFT REVISION      TOE SURGERY Right     removal of the 4th toe    WOUND DEBRIDEMENT Right 10/8/2021    Procedure: R 1st MTPJ washout ;  Surgeon: Deb Dent DPM;  [facility];  Service: Podiatry     Problem list reviewed  FAMILY HISTORY:  Non-contributory    SOCIAL HISTORY:  Social History     Substance and Sexual Activity   Alcohol Use Never    Alcohol/week: 0 0 standard drinks     Social History     Substance and Sexual Activity   Drug Use No     Social History     Tobacco Use   Smoking Status Never Smoker   Smokeless Tobacco Never Used       ALLERGIES:  Allergies   Allergen Reactions    Iodinated Diagnostic Agents Itching    Keflex [Cephalexin] Hives    Wound Dressing Adhesive Rash       MEDICATIONS:  All current active medications have been reviewed  Patient is currently not on any antibiotic  PHYSICAL EXAM:  Vitals:  Temp:  [97 8 °F (36 6 °C)-98 1 °F (36 7 °C)] 97 8 °F (36 6 °C)  HR:  [62] 62  Resp:  [16] 16  BP: (143-162)/(63-65) 162/63  SpO2:  [94 %-98 %] 98 %  Temp (24hrs), Av °F (36 7 °C), Min:97 8 °F (36 6 °C), Max:98 1 °F (36 7 °C)  Current: Temperature: 97 8 °F (36 6 °C)     Physical Exam:  General:  Well-nourished, well-developed, in no acute distress  Awake, alert and oriented x 3  Eyes:  Conjunctive clear with no hemorrhages or effusions  Oropharynx:  No ulcers, no lesions, pharynx benign, no tonsillitis  Neck:  Supple, no lymphadenopathy, no mass, nontender  Lungs:  Expansion symmetric, no rales, no wheezing, no accessory muscle use  Cardiac:  Regular rate and rhythm, normal S1, normal S2, no murmurs  Abdomen:  Soft, nondistended, non-tender, no HSM  Extremities:  Trace leg edema, no erythema, nontender  No draining ulcers  Skin:  No rashes, no ulcers  Neurological:  Moves all four extremities spontaneously, sensation grossly intact    LABS, IMAGING, & OTHER STUDIES:  Lab Results:  I have personally reviewed pertinent labs    Results from last 7 days   Lab Units 07/12/22  1048 07/11/22  0700 07/10/22  1342 07/09/22 1957 07/09/22 1932   POTASSIUM mmol/L 5 5* 5 7* 5 5*   < >  --    CHLORIDE mmol/L 102 101 101   < >  --    CO2 mmol/L 20* 23 27   < >  --    CO2, I-STAT mmol/L  --   --   --   --  33*   BUN mg/dL 94* 86* 68*   < >  --    CREATININE mg/dL 11 70* 10 10* 8 83*   < >  --    EGFR ml/min/1 73sq m 3 3 4   < >  --    GLUCOSE, ISTAT mg/dl  --   --   --   --  319*   CALCIUM mg/dL 7 8* 8 1* 8 4   < >  --    AST U/L  --  11 13  --   --    ALT U/L  --  17 18  --   --    ALK PHOS U/L  --  133* 123*  --   --     < > = values in this interval not displayed  Results from last 7 days   Lab Units 07/12/22  1048 07/10/22  1342 07/09/22 1957   WBC Thousand/uL 5 78 5 27 5 83   HEMOGLOBIN g/dL 8 6* 8 6* 9 0*   PLATELETS Thousands/uL 135* 149 137*           Imaging Studies:   I have personally reviewed pertinent imaging study reports and images in PACS  Chest/abdomen/pelvis CT reviewed personally  No acute changes  Lumbar spine MRI without contrast reviewed personally  Marrow edema at L4-5 with high signal in the intervertebral disc space, with disc herniation  No obvious diskitis/osteomyelitis  EKG, Pathology, and Other Studies:   I have personally reviewed pertinent reports

## 2022-07-12 NOTE — ASSESSMENT & PLAN NOTE
· Patient started on COVID-19 mild protocol  · Dexamethasone and remdesivir started  · Currently without oxygen needs   · Will discontinue steroids in the setting of concern for spinal diskitis/osteomyelitis  · Labs per COVID protocol

## 2022-07-12 NOTE — ASSESSMENT & PLAN NOTE
Lab Results   Component Value Date    HGBA1C 9 4 (H) 07/09/2022       Recent Labs     07/12/22  0804 07/12/22  0835 07/12/22  0921 07/12/22  1204   POCGLU 65 70 138 143*       Blood Sugar Average: Last 72 hrs:  (P) 344 [id number]     Endocrinology following

## 2022-07-12 NOTE — ASSESSMENT & PLAN NOTE
· Pt on coumadin due to history of DVT, currently on hold   · INR elevated 3 66 on admission held coumadin - then resumed 07/10/2022  · Will increase Coumadin to 9 mg dosing, patient's home dose  · As INR trending downward status post 3 mg night prior    · Would have low threshold to image if change in mental status sp fall at home  · Last INR 2 28

## 2022-07-12 NOTE — PROGRESS NOTES
[facility]  Progress Note Richard Conteh 1965, 56 y o  female MRN: [id number]  Unit/Bed#: [facility] 630-01 Encounter: [id number]  Primary Care Provider: Anamaria Castillo MD   Date and time admitted to hospital: 7/9/2022  6:33 PM    * COVID-19  Assessment & Plan  · Patient started on COVID-19 mild protocol  · Dexamethasone and remdesivir started  · - patient with uncontrolled blood sugars in setting of steroid use however patient continues to desat upon removal oxygen therefore would concern you at this time  · - endocrinology consulted  · Labs per COVID protocol  · Pt is removing as needed     Chronic anticoagulation  Assessment & Plan  · Pt on coumadin due to history of DVT, currently on hold   · INR elevated 3 66 on admission held coumadin - then resumed 07/10/2022  · Will increase Coumadin to 9 mg dosing, patient's home dose  · As INR trending downward status post 3 mg night prior  · Would have low threshold to image if change in mental status sp fall at home    Lumbar radiculopathy  Assessment & Plan  Present on admission, increased pain  · Patient reports significant discomfort lumbar spine  · Has been seeing pain management outpatient  · Last visit 06/16/2022  · Recommendations to take pregabalin as prescribed 1 daily and 1 on hemodialysis  · EMG of the right lower extremity was ordered  · Medication options limited secondary to hemodialysis/follow-up in 8 weeks  · Discussed with neurosurgery MRI without contrast ordered of lumbar spine as prior imaging of lumbar spine was reviewed  · Once MRI complete place consult for Neurosurgery    Pulmonary embolus [facility])  Assessment & Plan  · Continue warfarin  · INR was supratherapeutic on admission now down trending  · Coumadin increased to patient's home dose 9 mg    Acquired hypothyroidism  Assessment & Plan  ·  levothyroxine 50 mcg daily      Type 2 diabetes mellitus [facility])  Assessment & Plan  Lab Results   Component Value Date    HGBA1C 9 4 (H) 07/09/2022       Recent Labs     07/11/22  1800 07/11/22  2009 07/11/22  2206 07/11/22  2355   POCGLU 142* 132 72 153*       Blood Sugar Average: Last 72 hrs:  (P) 206 375   · Uncontrolled and labile in setting of poorly controlled diabetic on iv steroids for covid   · Increased sliding scale to 3 added bedtime and 2am chk dt her being labile   · May need to reduce level but will continue for now noting lunch time drop     Diabetic polyneuropathy associated with type 2 diabetes mellitus (HCC)  Assessment & Plan  Lab Results   Component Value Date    HGBA1C 9 4 (H) 07/09/2022     On Lantus and insulin sliding scale  Accu-Cheks per protocol  Blood Sugar Average: Last 72 hrs:  (P) 216 [id number]     · Place pt on insulin drip, started early hours of 7/11  due to increased blood sugars  · Lantus was discontinued  · Currently on insulin drip  · Late evening  · Claretta Litter / renal diet Patient found to be lethargic/barely responsive with blood sugar of 27  · Due to COVID precautions patient does not appear to be receiving her breakfast last 2 mornings with same hypoglycemic events  · - discussed with dietary/floor leadership -   · Patient then received 2 half amp dextrose with an increase in blood sugars  · Patient's steroids being administered late evening  Discussed with endocrinology and recommended changing steroid dosing to  9:00 a m  Agnieszka Mccloud · Dexamethasone transition to 9:00 a m  07/12/2022 meaning patient will skip dose IV steroids today 07/10/2022    Esophageal reflux  Assessment & Plan  · Continue pantoprazole; also on Reglan and Bentyl as needed    ESRD on hemodialysis  Assessment & Plan  Lab Results   Component Value Date    EGFR 3 07/11/2022    EGFR 4 07/10/2022    EGFR 5 07/09/2022    CREATININE 10 10 (H) 07/11/2022    CREATININE 8 83 (H) 07/10/2022    CREATININE 7 44 (H) 07/09/2022   · Nephrology consult for hemodialysis    · Labs on admission with noted hyperkalemia  · Treated with Kayexalate        VTE Pharmacologic Prophylaxis:   High Risk (Score >/= 5) - Pharmacological DVT Prophylaxis Ordered: warfarin (Coumadin)  Sequential Compression Devices Ordered  Patient Centered Rounds: I performed bedside rounds with nursing staff today  Discussions with Specialists or Other Care Team Provider:  Nursing/endocrine    Education and Discussions with Family / Patient: Patient declined call to   Time Spent for Care: 90 minutes  More than 50% of total time spent on counseling and coordination of care as described above /tiger text in regards to low blood sugar upon arrival to room spent greater than 90 minutes coordinating visiting was patient    Current Length of Stay: 3 day(s)  Current Patient Status: Inpatient   Certification Statement: The patient will continue to require additional inpatient hospital stay due to Ongoing COVID management and uncontrolled diabetes in setting of steroids  Discharge Plan: Anticipate discharge in 48-72 hrs to home  Code Status: Level 1 - Full Code    Subjective:   Upon arrival to room patient is lethargic but responsive slow and taking a while to process questions  Blood sugar upon arrival is 27  To half amps dextrose administered patient still slow were however improved in the 30 minutes to 40 minutes in the room  Objective:     Vitals:   Temp (24hrs), Av 1 °F (36 7 °C), Min:98 1 °F (36 7 °C), Max:98 1 °F (36 7 °C)    Temp:  [98 1 °F (36 7 °C)] 98 1 °F (36 7 °C)  HR:  [62] 62  Resp:  [16] 16  BP: (143)/(65) 143/65  SpO2:  [94 %] 94 %  Body mass index is 46 12 kg/m²  Input and Output Summary (last 24 hours): Intake/Output Summary (Last 24 hours) at 2022 0015  Last data filed at 2022 2209  Gross per 24 hour   Intake 393 72 ml   Output --   Net 393 72 ml       Physical Exam:   Physical Exam  Constitutional:       General: She is not in acute distress       Appearance: She is not ill-appearing, toxic-appearing or diaphoretic  HENT:      Head: Normocephalic and atraumatic  Nose: No congestion or rhinorrhea  Eyes:      General:         Right eye: No discharge  Left eye: No discharge  Conjunctiva/sclera: Conjunctivae normal    Cardiovascular:      Rate and Rhythm: Normal rate  Heart sounds: No murmur heard  No friction rub  No gallop  Pulmonary:      Effort: No respiratory distress  Breath sounds: No stridor  No wheezing, rhonchi or rales  Chest:      Chest wall: No tenderness  Abdominal:      General: There is no distension  Palpations: There is no mass  Tenderness: There is no abdominal tenderness  There is no guarding or rebound  Hernia: No hernia is present  Musculoskeletal:         General: Deformity (Right lower extremity healed TMA) present  No tenderness or signs of injury  Skin:     Coloration: Skin is not jaundiced or pale  Findings: No bruising, erythema, lesion or rash  Neurological:      Mental Status: She is alert and oriented to person, place, and time     Psychiatric:         Behavior: Behavior normal           Additional Data:     Labs:  Results from last 7 days   Lab Units 07/10/22  1342   WBC Thousand/uL 5 27   HEMOGLOBIN g/dL 8 6*   HEMATOCRIT % 26 3*   PLATELETS Thousands/uL 149   NEUTROS PCT % 72   LYMPHS PCT % 14   MONOS PCT % 13*   EOS PCT % 0     Results from last 7 days   Lab Units 07/11/22  0700   SODIUM mmol/L 134*   POTASSIUM mmol/L 5 7*   CHLORIDE mmol/L 101   CO2 mmol/L 23   BUN mg/dL 86*   CREATININE mg/dL 10 10*   ANION GAP mmol/L 10   CALCIUM mg/dL 8 1*   ALBUMIN g/dL 2 6*   TOTAL BILIRUBIN mg/dL 0 44   ALK PHOS U/L 133*   ALT U/L 17   AST U/L 11   GLUCOSE RANDOM mg/dL 355*     Results from last 7 days   Lab Units 07/11/22  0700   INR  2 28*     Results from last 7 days   Lab Units 07/11/22  2355 07/11/22  2206 07/11/22  2009 07/11/22  1800 07/11/22  1605 07/11/22  1431 07/11/22  1154 07/11/22  1018 07/11/22  1010 07/11/22  0954 07/11/22  0746 07/11/22  0526   POC GLUCOSE mg/dl 153* 72 132 142* 224* 324* 318* 220* 200* 27* 244* >500*     Results from last 7 days   Lab Units 07/09/22  2346   HEMOGLOBIN A1C % 9 4*           Lines/Drains:  Invasive Devices  Report    Peripheral Intravenous Line  Duration           Peripheral IV 07/11/22 Right;Ventral (anterior) Forearm <1 day          Line  Duration           Hemodialysis AV Fistula 02/20/18 Left Forearm 1602 days                      Imaging: Reviewed radiology reports from this admission including: chest xray    Recent Cultures (last 7 days):         Last 24 Hours Medication List:   Current Facility-Administered Medications   Medication Dose Route Frequency Provider Last Rate    acetaminophen  650 mg Oral Q6H PRN Jane Ramesh MD      albuterol  2 puff Inhalation Q6H PRN Jane Ramesh MD      ALPRAZolam  0 25 mg Oral 4x Daily PRN Jane Ramesh MD      aluminum-magnesium hydroxide-simethicone  30 mL Oral Q6H PRN Jane Ramesh MD      atorvastatin  20 mg Oral QPM Jane Ramesh MD      b complex-vitamin C-folic acid  1 capsule Oral Daily With Marijane Bernheim, MD      calcitriol  0 25 mcg Oral Daily Erum Claudio, DO      carvedilol  25 mg Oral BID Jane Ramesh MD      cholecalciferol  2,000 Units Oral Daily Glotamera Fallbigg, DO      cinacalcet  30 mg Oral Daily Jane Ramesh MD      dexamethasone  6 mg Intravenous Q24H LOIS Allred      dicyclomine  10 mg Oral Q6H PRN Jane Ramesh MD      docusate sodium  100 mg Oral BID PRN Jane Ramesh MD      insulin regular (HumuLIN R,NovoLIN R) infusion  0 3-21 Units/hr Intravenous Titrated Jodi Narayan PA-C 1 Units/hr (07/11/22 3778)    levothyroxine  50 mcg Oral Daily Jane Ramesh MD      lidocaine  1 patch Topical Daily Yefri Dudley MD      loratadine  10 mg Oral Daily Jane Ramesh MD      melatonin  3 mg Oral HS Yefri Orourke Herrera Moreon MD      menthol-methyl salicylate   Apply externally 4x Daily PRN Nan Han PA-C      methocarbamol  500 mg Oral BID Diane Seth MD      NIFEdipine  30 mg Oral Daily Diane Seth MD      nystatin   Topical BID Diane Seth MD      ondansetron  4 mg Intravenous Q6H PRN Diane Seth MD      pantoprazole  40 mg Oral Early Morning Diane Seth MD      polyethylene glycol  17 g Oral Daily Diane Seth MD      remdesivir  100 mg Intravenous Q24H Diane Seth MD 0 mg (07/10/22 2046)    sertraline  75 mg Oral Daily Diane Seth MD      sevelamer  2,400 mg Oral TID With Meals Donice Backer, DO      sodium polystyrene sulfonate  15 g Oral PRN Diane Seth MD      torsemide  100 mg Oral Daily Diane Seth MD      warfarin  9 mg Oral Daily (warfarin) LOIS Smith      zolpidem  5 mg Oral HS Diane Seth MD          Today, Patient Was Seen By: LOIS Smith    **Please Note: This note may have been constructed using a voice recognition system  **

## 2022-07-12 NOTE — CONSULTS
[address] 1965, 56 y o  female MRN: [id number]  Unit/Bed[facility] 630-01 Encounter: [id number]  Primary Care Provider: Kobi Ferrera MD   Date and time admitted to hospital: 7/9/2022  6:33 PM    Inpatient consult to Neurosurgery  Consult performed by: Chris Pierson PA-C  Consult ordered by: Ricardo Larkin PA-C          Concern for Osteomyelitis/Discitis of lumbar region  Assessment & Plan  · Pt with complaint of low back pain with radiates to RLE  · Presented to the ED on 7/9/22 s/p fall  Also found to be Covid + as of 7/9/22  · Recent MRI concerning for L4-5 bilateral septic facets and early discitis/Osteomyelitis  · Pt with hx of right foot diabetic ulcer, osteomyelitis/MRSA infection with prior tx with IV abx  Pt is s/p right foot amputation in 2021  · Imaging reviewed personally and by attending  Final results as below  · MRI Lumbar spine wo 7/11/22: There is high signal in the intervertebral disc space  There is a central/right paracentral disc herniation, protrusion type extending into the right neural foramen  Large bilateral facet effusions  Findings concerning for bilateral septic facets and early discitis/Osteomyelitis  Plan  · Continue regular neurologic checks  · Eval and mobilize per PT/OT  · Will determine if LSO brace required based on xrays  · DVT PPX: SCDs   Upright Xray of lumbar spine ordered  ID was consulted  Blood Cultures x 2 pending  ESR 7/12 wln at 26  CRP 7/12: elevated at 23 9 but decreased from 45 2 on 7/10, while pt was on Dexamethasone for Covid that has since been stopped  Per ID, will monitor with serial labs/MRI  Should changes be noted, may consider biopsy  Pt had MRI L spine wo that showed L4-5 disc herniation as well as L4-5 bilateral facet effusions concerning for septic facet joints and high signal at L4-5 disc space concerning for early discitis/osteomyelitis     Though pt noted to have L4-5 disc protrusion extending in to the right foramen, pt is not a good surgical candidate at this time in the setting of concern for OM/Discitis at L4-5 as well as multiple comorbidities including DM with HgbA1C of 9 4, ESRD on Hemodialysis, hx of DVT/PE on Coumadin and recent Covid infection  Will obtain baseline upright xrays of the lumbar spine to assess alignment of the lumbar spine  Neurosurgery will review xrays of lumbar spine when completed  Please call with any questions or concerns  Lumbar radiculopathy  Assessment & Plan  See plan above  Low back pain with sciatica  Assessment & Plan  See plan above    ESRD on hemodialysis  Assessment & Plan  Lab Results   Component Value Date    EGFR 3 07/12/2022    EGFR 3 07/11/2022    EGFR 4 07/10/2022    CREATININE 11 70 (H) 07/12/2022    CREATININE 10 10 (H) 07/11/2022    CREATININE 8 83 (H) 07/10/2022       Diabetic polyneuropathy associated with type 2 diabetes mellitus [facility])  Assessment & Plan  Lab Results   Component Value Date    HGBA1C 9 4 (H) 07/09/2022       Recent Labs     07/12/22  0804 07/12/22  0835 07/12/22  0921 07/12/22  1204   POCGLU 65 70 138 143*       Blood Sugar Average: Last 72 hrs:  (P) 022 [id number]     Endocrinology following  Pulmonary embolus [facility])  Assessment & Plan  Patient with history of DVT and PE on Coumadin  * COVID-19  Assessment & Plan  Found to be COVID positive on 07/09/2022    Management per primary team       History of Present Illness   History, ROS and PFSH obtained from patient and chart review  HPI: Nelly Monahan is a 56 y o  female with PMH including diabetes mellitus, end-stage kidney disease on hemodialysis, hypertension, cardiomyopathy, chronic pain syndrome, History of DVT and PE on Coumadin, history of right foot diabetic ulcer/osteomyelitis previously treated with IV antibiotics now status post transmetatarsal right foot amputation in 2021 who presented to the ED s/p fall during dialysis and found to have Covid infection as of 7/9/22  During admission patient reported back pain which was further evaluated with an MRI of lumbar spine that showed L4-5 facet effusions and high signal at L4-5 disc concerning for possible early OM/Discitis/septic arthritis as wells as disc protrusion at this level for which neurosurgery was consulted  Today patient reports she continues to have severe low back pain which radiates the right buttock and right posterior/lateral thigh  Patient reports intermittent numbness in this distribution  Patient also reports chronic numbness in the right foot  Pt reports weakness in BLE, right > left  Patient reports he has been able to mobilize to the bathroom to void  She reports that due to dialysis she only urinates a little  She denies bowel or bladder incontinence but reports loose stools since she has Covid infection  She also reports nausea and vomiting yesterday  Pt denies fever, chills or malaise at this time  Review of Systems   Constitutional: Negative for fever  HENT: Negative for hearing loss  Eyes: Negative for pain  Reports right eye vision loss 2/2 to diabetic retinopathy  Respiratory: Negative for chest tightness and shortness of breath  Cardiovascular: Negative for chest pain and palpitations  Gastrointestinal: Positive for nausea and vomiting  Genitourinary: Negative for dysuria  Musculoskeletal: Positive for back pain and gait problem  Negative for neck pain and neck stiffness  Skin: Negative for pallor and rash  Neurological: Positive for weakness and numbness  Negative for dizziness and light-headedness  Psychiatric/Behavioral: Negative for agitation, behavioral problems, confusion and decreased concentration         Historical Information   Past Medical History:   Diagnosis Date    Abnormal uterine bleeding (AUB)     Anemia     Anxiety     Arthritis     Chronic kidney disease     Claustrophobia     Diabetes mellitus [facility] )     Disease of thyroid gland     DVT (deep venous thrombosis) (HCC)     End stage kidney disease (HCC)     [facility] ulcer due to secondary DM ([facility] )     Hemodialysis patient ([place] [address] )     Tues, Thurs, Sat    Hypertension     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[facility] )     right eye    Morbid obesity ([facility] )     Osteomyelitis of fifth toe of right foot ([facility] )     Panic attacks     Pulmonary embolism (HCC)     Reflux esophagitis     Thrombophlebitis of saphenous vein     Warfarin anticoagulation      Past Surgical History:   Procedure Laterality Date    AMPUTATION      r 4th toe    ARTERIOVENOUS GRAFT PLACEMENT      CATARACT EXTRACTION Bilateral     CERVICAL BIOPSY  W/ LOOP ELECTRODE EXCISION       SECTION      DIALYSIS FISTULA CREATION      DILATION AND CURETTAGE OF UTERUS      ENDOMETRIAL ABLATION W/ NOVASURE      EYE SURGERY      HYSTERECTOMY      @ age 57 (complete)    IR AV FISTULAGRAM/GRAFTOGRAM  10/11/2019    IR AV FISTULAGRAM/GRAFTOGRAM  2020    IR AV FISTULAGRAM/GRAFTOGRAM  2020    IR NON-TUNNELED CENTRAL LINE PLACEMENT  2021    IR NON-TUNNELED CENTRAL LINE PLACEMENT  10/4/2021    OOPHORECTOMY Bilateral     @ age 57    [facility] Right 2021    Procedure: AMPUTATION TRANSMETATARSAL (TMA);  Surgeon: Yg Ferrer DPM;  [facility];  Service: Podiatry    MI AMPUTATION TOE,MT-P JT Right 2020    Procedure: AMPUTATION TOE- 5th;  Surgeon: Yg Ferrer DPM;  [facility];  Service: Podiatry    MI COLONOSCOPY FLX DX W/COLLJ SPEC WHEN PFRMD N/A 3/13/2019    Procedure: COLONOSCOPY;  Surgeon: Hong Orona MD;  Location: [facility]; Service: Gastroenterology    MI ESOPHAGOGASTRODUODENOSCOPY TRANSORAL DIAGNOSTIC N/A 3/13/2019    Procedure: ESOPHAGOGASTRODUODENOSCOPY (EGD); Surgeon: Hong Orona MD;  [facility]: [facility];   Service: Gastroenterology    MI LAPAROSCOPY W TOT HYSTERECT UTERUS 250 GRAM OR LESS N/A 2/16/2016    Procedure: HYSTERECTOMY LAPAROSCOPIC TOTAL ([address]), with bilateral salpingectomy;  Surgeon: Arlen Walsh DO;  [facility];  Service: Gynecology    THROMBECTOMY / ARTERIOVENOUS GRAFT REVISION      TOE SURGERY Right     removal of the 4th toe    WOUND DEBRIDEMENT Right 10/8/2021    Procedure: R 1st MTPJ washout ;  Surgeon: Mirna Barksdale DPM;  [facility];  Service: Podiatry     Social History     Substance and Sexual Activity   Alcohol Use Never    Alcohol/week: 0 0 standard drinks     Social History     Substance and Sexual Activity   Drug Use No     Social History     Tobacco Use   Smoking Status Never Smoker   Smokeless Tobacco Never Used     Family History   Problem Relation Age of Onset    Heart disease Family     Diabetes Family     Heart disease Mother     Cancer Brother         neck    Throat cancer Brother     Ovarian cancer Maternal Aunt 40       Meds/Allergies   all current active meds have been reviewed, current meds:   Current Facility-Administered Medications   Medication Dose Route Frequency    acetaminophen (TYLENOL) tablet 650 mg  650 mg Oral Q6H PRN    albuterol (PROVENTIL HFA,VENTOLIN HFA) inhaler 2 puff  2 puff Inhalation Q6H PRN    ALPRAZolam (XANAX) tablet 0 25 mg  0 25 mg Oral 4x Daily PRN    aluminum-magnesium hydroxide-simethicone (MYLANTA) oral suspension 30 mL  30 mL Oral Q6H PRN    atorvastatin (LIPITOR) tablet 20 mg  20 mg Oral QPM    b complex-vitamin C-folic acid (NEPHROCAPS) capsule 1 capsule  1 capsule Oral Daily With Dinner    calcitriol (ROCALTROL) capsule 0 25 mcg  0 25 mcg Oral Daily    carvedilol (COREG) tablet 25 mg  25 mg Oral BID    cholecalciferol (VITAMIN D3) tablet 2,000 Units  2,000 Units Oral Daily    cinacalcet (SENSIPAR) tablet 30 mg  30 mg Oral Daily    dicyclomine (BENTYL) capsule 10 mg  10 mg Oral Q6H PRN    docusate sodium (COLACE) capsule 100 mg  100 mg Oral BID PRN    insulin glargine (LANTUS) subcutaneous injection 12 Units 0 12 mL  12 Units Subcutaneous HS    insulin lispro (HumaLOG) 100 units/mL subcutaneous injection 1-5 Units  1-5 Units Subcutaneous 4x Daily (AC & HS)    levothyroxine tablet 50 mcg  50 mcg Oral Daily    lidocaine (LIDODERM) 5 % patch 1 patch  1 patch Topical Daily    loratadine (CLARITIN) tablet 10 mg  10 mg Oral Daily    melatonin tablet 3 mg  3 mg Oral HS    menthol-methyl salicylate (BENGAY) 14-58 % cream   Apply externally 4x Daily PRN    methocarbamol (ROBAXIN) tablet 500 mg  500 mg Oral BID    NIFEdipine (PROCARDIA XL) 24 hr tablet 30 mg  30 mg Oral Daily    nystatin (MYCOSTATIN) powder   Topical BID    ondansetron (ZOFRAN) injection 4 mg  4 mg Intravenous Q6H PRN    oxyCODONE (ROXICODONE) IR tablet 5 mg  5 mg Oral Q4H PRN    pantoprazole (PROTONIX) EC tablet 40 mg  40 mg Oral Early Morning    polyethylene glycol (MIRALAX) packet 17 g  17 g Oral Daily    remdesivir (Veklury) 100 mg in sodium chloride 0 9 % 270 mL IVPB  100 mg Intravenous Q24H    sertraline (ZOLOFT) tablet 75 mg  75 mg Oral Daily    sevelamer (RENAGEL) tablet 2,400 mg  2,400 mg Oral TID With Meals    sodium polystyrene sulfonate (KAYEXALATE) oral suspension 15 g  15 g Oral PRN    torsemide (DEMADEX) tablet 100 mg  100 mg Oral Daily    warfarin (COUMADIN) tablet 10 mg  10 mg Oral Once (warfarin)    [START ON 7/14/2022] warfarin (COUMADIN) tablet 9 mg  9 mg Oral Daily (warfarin)    zolpidem (AMBIEN) tablet 5 mg  5 mg Oral HS    and PTA meds:   Prior to Admission Medications   Prescriptions Last Dose Informant Patient Reported? Taking? ALPRAZolam (XANAX) 0 25 mg tablet  Self Yes No   Sig: Take 0 25 mg by mouth 2 (two) times a day as needed for anxiety   ALPRAZolam (XANAX) 0 5 mg tablet   Yes No   Sig: TAKE 2 AND 1/2 TABLETS DAILY IN DIVIDED DOSES AS DIRECTED     Accu-Chek Guide test strip   Yes No   Sig: use to TEST BLOOD SUGAR two to three times a day   Accu-Chek Softclix Lancets lancets  Self Yes No   Sig: 3 (three) times a day Use to test blood sugar   B Complex-C-Folic Acid (Natalia-Denys) TABS   No No   Sig: TAKE ONE TABLET BY MOUTH DAILY   KIONEX 15 GM/60ML suspension  Self Yes No   Sig: Take by mouth Takes as a shake on dialysis days Tues-Thurs_Sat    NIFEdipine (ADALAT CC) 30 MG 24 hr tablet   No No   Sig: Take 1 tablet (30 mg total) by mouth daily   NOVOLOG FLEXPEN 100 units/mL SOPN  Self Yes No   Sig: INJECT 1 TO 5 UNITS SUBCUTANEOUSLY THREE TIMES A DAY BEFORE MELAS AS PER SLIDING SCALE   acetaminophen (TYLENOL) 325 mg tablet   No No   Sig: Take 2 tablets (650 mg total) by mouth every 6 (six) hours as needed for mild pain or fever   Patient not taking: No sig reported   albuterol (ProAir HFA) 90 mcg/act inhaler   No No   Sig: Inhale 2 puffs every 6 (six) hours as needed for wheezing or shortness of breath   ammonium lactate (LAC-HYDRIN) 12 % cream   No No   Sig: Apply topically 2 (two) times a day   atorvastatin (LIPITOR) 20 mg tablet  Self Yes No   Sig: Take 20 mg by mouth every evening    carvedilol (COREG) 25 mg tablet   No No   Sig: TAKE ONE TABLET BY MOUTH TWICE A DAY   cinacalcet (SENSIPAR) 30 mg tablet  Self Yes No   Sig: Take 30 mg by mouth daily   dicyclomine (BENTYL) 10 mg capsule   Yes No   Sig: TAKE 1 TABLET BY MOUTH EVERY 6 HOURS OR AS NEEDED FOR PAIN   insulin glargine (Lantus SoloStar) 100 units/mL injection pen   No No   Sig: Inject 15 Units under the skin daily at bedtime   levothyroxine 50 mcg tablet  Self Yes No   Sig: Take 50 mcg by mouth daily   lidocaine (LIDODERM) 5 %   Yes No   loratadine (CLARITIN) 10 mg tablet  Self Yes No   Sig: Take 10 mg by mouth daily   melatonin 3 mg   No No   Sig: Take 1 tablet (3 mg total) by mouth daily at bedtime   methocarbamol (ROBAXIN) 500 mg tablet   No No   Sig: Take 1 tablet (500 mg total) by mouth 2 (two) times a day   metoclopramide (REGLAN) 10 mg/10 mL oral solution   No No   Sig: Take 5 mL (5 mg total) by mouth every 6 (six) hours as needed (nausea)   nystatin (MYCOSTATIN) powder  Self No No   Sig: Apply topically 2 (two) times a day   ondansetron (ZOFRAN-ODT) 8 mg disintegrating tablet   Yes No   oxyCODONE (ROXICODONE) 5 immediate release tablet   No No   Sig: Take 1 tablet (5 mg total) by mouth every 4 (four) hours as needed for severe pain Max Daily Amount: 30 mg   Patient not taking: No sig reported   pantoprazole (PROTONIX) 40 mg tablet   Yes No   Sig: take 1 tablet by mouth twice a day   polyethylene glycol (MIRALAX) 17 g packet   No No   Sig: Take 17 g by mouth daily for 7 days   polyethylene glycol (MIRALAX) 17 g packet   No No   Sig: Take 17 g by mouth daily   pregabalin (LYRICA) 50 mg capsule   No No   Sig: Take 1 PO daily, take 1 PO additionally directly have HD on HD days   senna (SENOKOT) 8 6 mg  Self No No   Sig: Take 2 tablets (17 2 mg total) by mouth daily at bedtime as needed for constipation   sertraline (ZOLOFT) 50 mg tablet  Self No No   Sig: Take 1 tablet (50 mg total) by mouth daily   Patient taking differently: Take 75 mg by mouth in the morning     sevelamer (RENAGEL) 800 mg tablet   No No   Sig: Take 2 tablets (1,600 mg total) by mouth 3 (three) times a day with meals   torsemide (DEMADEX) 100 mg tablet   No No   Sig: Take 1 tablet (100 mg total) by mouth 4 (four) times a week On Sunday, Monday, Wednesday, Friday   Patient taking differently: Take 100 mg by mouth 4 (four) times a week On Sunday, Monday, Wednesday, Friday (pt states she takes it everyday)   urea (CARMOL) 40 %   No No   Sig: APPLY TO AFFECTED AREA TOPICALLY EVERY DAY   warfarin (COUMADIN) 3 mg tablet  Self No No   Sig: Take 3 tablets (9 mg total) by mouth daily Takes 9 mg Monday Sat   zolpidem (AMBIEN) 5 mg tablet   Yes No   Sig: Take 5 mg by mouth daily at bedtime      Facility-Administered Medications: None     Allergies   Allergen Reactions    Iodinated Diagnostic Agents Itching    Keflex [Cephalexin] Hives    Eggs Or Egg-Derived Products - Food Allergy Rash    Wound Dressing Adhesive Rash       Objective   I/O       07/10 0701 07/11 0700 07/11 0701 07/12 0700 07/12 0701 07/13 0700    P  O  440      I V  (mL/kg)  126 3 (1) 200 (1 5)    IV Piggyback 270 270     Total Intake(mL/kg) 710 (5 5) 396 3 (3) 200 (1 5)    Net +710 +396 3 +200           Unmeasured Urine Occurrence 2 x            Physical Exam  Constitutional:       General: She is not in acute distress  Appearance: She is well-developed  HENT:      Head: Normocephalic and atraumatic  Eyes:      Pupils: Pupils are equal, round, and reactive to light  Neck:      Trachea: No tracheal deviation  Cardiovascular:      Rate and Rhythm: Normal rate  Pulmonary:      Effort: Pulmonary effort is normal       Comments: Nasal Canula O2 in place  Abdominal:      Palpations: Abdomen is soft  Tenderness: There is no abdominal tenderness  There is no guarding  Musculoskeletal:      Cervical back: Neck supple  Skin:     General: Skin is warm and dry  Coloration: Skin is not pale  Findings: No rash  Neurological:      Mental Status: She is alert  Comments: Awake, Alert, Oriented, speech is clear and fluent    Motor: ULLOA, strength BUE 5/5, RLE: HF/KF 4 to 4+/5 (right foot transmetatarsal amputation)  LLE: 4+-5/5  Sensation:  Decreased to pinprick right posterolateral thigh and below the right ankle, otherwise intact to pinprick in other extremities  Coordination: no drift bilateral upper extremities   Psychiatric:         Behavior: Behavior normal        Neurologic Exam     Cranial Nerves     CN III, IV, VI   Pupils are equal, round, and reactive to light  Vitals:Blood pressure (!) 174/71, pulse 62, temperature 97 8 °F (36 6 °C), resp  rate 16, weight 130 kg (285 lb 11 5 oz), last menstrual period 02/12/2016, SpO2 100 %, not currently breastfeeding  ,Body mass index is 46 12 kg/m²       Lab Results:   Results from last 7 days   Lab Units 07/13/22  0832 07/12/22  1048 07/10/22  1342   WBC Thousand/uL 5 55 5 78 5 27   HEMOGLOBIN g/dL 9 1* 8 6* 8 6*   HEMATOCRIT % 28 8* 27 0* 26 3*   PLATELETS Thousands/uL 126* 135* 149   NEUTROS PCT % 63 69 72   MONOS PCT % 11 10 13*     Results from last 7 days   Lab Units 07/13/22  0832 07/12/22  1048 07/11/22  0700 07/10/22  1342 07/09/22 1957 07/09/22  1932   POTASSIUM mmol/L 5 4* 5 5* 5 7* 5 5*   < >  --    CHLORIDE mmol/L 103 102 101 101   < >  --    CO2 mmol/L 27 20* 23 27   < >  --    CO2, I-STAT mmol/L  --   --   --   --   --  33*   BUN mg/dL 56* 94* 86* 68*   < >  --    CREATININE mg/dL 8 61* 11 70* 10 10* 8 83*   < >  --    CALCIUM mg/dL 7 9* 7 8* 8 1* 8 4   < >  --    ALK PHOS U/L  --   --  133* 123*  --   --    ALT U/L  --   --  17 18  --   --    AST U/L  --   --  11 13  --   --    GLUCOSE, ISTAT mg/dl  --   --   --   --   --  319*    < > = values in this interval not displayed  Results from last 7 days   Lab Units 07/13/22  0832 07/09/22 1957   MAGNESIUM mg/dL 2 3 1 9     Results from last 7 days   Lab Units 07/13/22  0832 07/09/22 1957   PHOSPHORUS mg/dL 7 0* 5 3*     Results from last 7 days   Lab Units 07/13/22  0832 07/12/22  1356 07/11/22  0700 07/10/22  1342 07/09/22 1957   INR  1 62* 2 00* 2 28*   < > 3 66*   PTT seconds  --   --   --   --  59*    < > = values in this interval not displayed  Imaging Studies: I have personally reviewed pertinent reports  EKG, Pathology, and Other Studies: I have personally reviewed pertinent reports  VTE Prophylaxis: Sequential compression device Eloisa Briseno)     Code Status: Level 1 - Full Code  Advance Directive and Living Will:      Power of :    POLST:      Counseling / Coordination of Care  I spent 45 minutes with the patient  PLEASE NOTE:  This encounter may have been completed utilizing the M- Modal/Fluency Direct Speech Voice Recognition Software   Grammatical errors, random word insertions, pronoun errors and incomplete sentences are occasional consequences of the system due to software limitations, ambient noise and hardware issues  These may be missed even after proof reading prior to affixing electronic signature  Please do not hesitate to contact me directly if you have any questions or concerns about the content, text or information contained within the body of this dictation

## 2022-07-12 NOTE — PROGRESS NOTES
[address] [facility]  Progress Note Pérez Cook 1965, 56 y o  female MRN: [id number]  Unit/Bed#: [facility] 630-01 Encounter: [id number]  Primary Care Provider: Clinton Hernandez MD   Date and time admitted to hospital: 7/9/2022  6:33 PM    * COVID-19  Assessment & Plan  · Patient started on COVID-19 mild protocol  · Dexamethasone and remdesivir started  · Currently without oxygen needs   · Will discontinue steroids in the setting of concern for spinal diskitis/osteomyelitis  · Labs per COVID protocol  Hypoxia  Assessment & Plan  · Probably in setting of covid vs hemo pt   · Started on mild COVID protocol  · Currently off oxygen     Lumbar radiculopathy  Assessment & Plan  Present on admission, increased pain  · Patient reports significant discomfort lumbar spine  · Has been seeing pain management outpatient  · MRI done inpatient showing concern for discitis/osteomyelitis  · Neurosurgery consulted appreciate recs   · Will consult ID as well   · Check blood cultures       Problem with vascular access  Assessment & Plan  · Per nursing, patient is hard stick and has blown multiple IVs   · She is not a candidate for PICC line given HD  Discussed with Nephrology regarding this  · Given possible need for antibiotics, and IV access, will consult IR for IJ placement    Type 2 diabetes mellitus [facility])  Assessment & Plan  Lab Results   Component Value Date    HGBA1C 9 4 (H) 07/09/2022       Recent Labs     07/12/22  0740 07/12/22  0804 07/12/22  0835 07/12/22  0921   POCGLU 73 65 70 138       Blood Sugar Average: Last 72 hrs:  (P) 171   · Uncontrolled and labile in setting of poorly controlled diabetic on iv steroids for covid   · Endocrine has been consulted   · Currently on Lantus 15 units with algorithm for sliding scale  · She has borderline hypoglycemia  · Discontinuing steroids in the setting of possible new infection  · Discussed with endocrinology    No changes at this time to insulin regimen    Diabetic polyneuropathy associated with type 2 diabetes mellitus ([facility] [address] )  Assessment & Plan  · S/p insulin gtt due to labile sugars w/ bouts of hypoglycemia   · Endocrine consulted changed to lantus 15 units and algorithm 4 SSI      ESRD on hemodialysis  Assessment & Plan  Lab Results   Component Value Date    EGFR 3 07/11/2022    EGFR 4 07/10/2022    EGFR 5 07/09/2022    CREATININE 10 10 (H) 07/11/2022    CREATININE 8 83 (H) 07/10/2022    CREATININE 7 44 (H) 07/09/2022   · Nephrology consult for hemodialysis  · Labs on admission with noted hyperkalemia  · Treated with Kayexalate    Chronic anticoagulation  Assessment & Plan  · Pt on coumadin due to history of DVT, currently on hold   · INR elevated 3 66 on admission held coumadin - then resumed 07/10/2022  · Will increase Coumadin to 9 mg dosing, patient's home dose  · As INR trending downward status post 3 mg night prior  · Would have low threshold to image if change in mental status sp fall at home  · Last INR 2 28    Pulmonary embolus (HCC)  Assessment & Plan  · Continue warfarin  · INR was supratherapeutic on admission now down trending  · Coumadin increased to patient's home dose 9 mg  · Redraw INR today  Continue to trend     Fall  Assessment & Plan  · Status post fall most likely secondary to ambulatory dysfunction with COVID-19/hypoxia  · Continue treatment and monitoring     Hyperkalemia  Assessment & Plan  · Treated   · Monitor bmp     Essential hypertension  Assessment & Plan  · Continue nifedipine and torsemide  Esophageal reflux  Assessment & Plan  · Continue pantoprazole; also on Reglan and Bentyl as needed    Acquired hypothyroidism  Assessment & Plan  ·  levothyroxine 50 mcg daily  VTE Pharmacologic Prophylaxis:   High Risk (Score >/= 5) - Pharmacological DVT Prophylaxis Ordered: warfarin (Coumadin)  Sequential Compression Devices Ordered      Patient Centered Rounds: I performed bedside rounds with nursing staff today   Discussions with Specialists or Other Care Team Provider: endocrine, IR, nephrology, infectious disease, CM     Education and Discussions with Family / Patient: Updated  (significant other) via phone  Time Spent for Care: 45 minutes  More than 50% of total time spent on counseling and coordination of care as described above  Current Length of Stay: 3 day(s)  Current Patient Status: Inpatient   Certification Statement: The patient will continue to require additional inpatient hospital stay due to pending discitis work up   Discharge Plan: Anticipate discharge in >72 hrs to pending clinical coarse     Code Status: Level 1 - Full Code    Subjective:   Patient is very tearful during exam today after learning of possible diagnosis of diskitis/osteomyelitis  She still stating that she has severe back pain radiating into her right buttock  She is complaining of some chest tightness today which he states is due to oxygen not being applied  She is currently saturating well on room air during exam     Objective:     Vitals:   Temp (24hrs), Av °F (36 7 °C), Min:97 8 °F (36 6 °C), Max:98 1 °F (36 7 °C)    Temp:  [97 8 °F (36 6 °C)-98 1 °F (36 7 °C)] 97 8 °F (36 6 °C)  HR:  [62] 62  Resp:  [16] 16  BP: (143-162)/(63-65) 162/63  SpO2:  [94 %-98 %] 98 %  Body mass index is 46 12 kg/m²  Input and Output Summary (last 24 hours): Intake/Output Summary (Last 24 hours) at 2022 1207  Last data filed at 2022 0234  Gross per 24 hour   Intake 396 34 ml   Output --   Net 396 34 ml       Physical Exam:   Physical Exam  Vitals and nursing note reviewed  Constitutional:       General: She is not in acute distress  Appearance: She is obese  HENT:      Head: Normocephalic and atraumatic  Mouth/Throat:      Mouth: Mucous membranes are moist       Pharynx: Oropharynx is clear  No oropharyngeal exudate  Eyes:      General:         Right eye: No discharge           Left eye: No discharge  Cardiovascular:      Rate and Rhythm: Normal rate and regular rhythm  Pulses: Normal pulses  Heart sounds: Normal heart sounds  No murmur heard  Pulmonary:      Effort: Pulmonary effort is normal  No respiratory distress  Breath sounds: Normal breath sounds  No wheezing or rales  Abdominal:      General: Abdomen is flat  There is no distension  Palpations: Abdomen is soft  Tenderness: There is no abdominal tenderness  Musculoskeletal:         General: Tenderness (lumbar spine midline ) present  Cervical back: No muscular tenderness  Right lower leg: No edema  Left lower leg: No edema  Comments: R foot toe amputations   Skin:     General: Skin is warm and dry  Coloration: Skin is not jaundiced  Neurological:      General: No focal deficit present  Mental Status: She is alert and oriented to person, place, and time  Cranial Nerves: No cranial nerve deficit     Psychiatric:         Mood and Affect: Mood normal           Additional Data:     Labs:  Results from last 7 days   Lab Units 07/12/22  1048   WBC Thousand/uL 5 78   HEMOGLOBIN g/dL 8 6*   HEMATOCRIT % 27 0*   PLATELETS Thousands/uL 135*   NEUTROS PCT % 69   LYMPHS PCT % 20   MONOS PCT % 10   EOS PCT % 1     Results from last 7 days   Lab Units 07/11/22  0700   SODIUM mmol/L 134*   POTASSIUM mmol/L 5 7*   CHLORIDE mmol/L 101   CO2 mmol/L 23   BUN mg/dL 86*   CREATININE mg/dL 10 10*   ANION GAP mmol/L 10   CALCIUM mg/dL 8 1*   ALBUMIN g/dL 2 6*   TOTAL BILIRUBIN mg/dL 0 44   ALK PHOS U/L 133*   ALT U/L 17   AST U/L 11   GLUCOSE RANDOM mg/dL 355*     Results from last 7 days   Lab Units 07/11/22  0700   INR  2 28*     Results from last 7 days   Lab Units 07/12/22  1204 07/12/22  0921 07/12/22  0835 07/12/22  0804 07/12/22  0740 07/12/22  0603 07/12/22  0411 07/12/22  0154 07/11/22  2355 07/11/22  2206 07/11/22  2009 07/11/22  1800   POC GLUCOSE mg/dl 143* 138 70 65 73 48* 81 156* 153* 72 132 142*     Results from last 7 days   Lab Units 07/09/22  2346   HEMOGLOBIN A1C % 9 4*           Lines/Drains:  Invasive Devices  Report    Peripheral Intravenous Line  Duration           Peripheral IV 07/11/22 Right;Ventral (anterior) Forearm <1 day    Peripheral IV 07/12/22 Right;Ventral (anterior) Forearm <1 day          Line  Duration           Hemodialysis AV Fistula 02/20/18 Left Forearm 1602 days                      Imaging: Reviewed radiology reports from this admission including: MRI spine    Recent Cultures (last 7 days):         Last 24 Hours Medication List:   Current Facility-Administered Medications   Medication Dose Route Frequency Provider Last Rate    acetaminophen  650 mg Oral Q6H PRN Adrianne Honey, MD      albuterol  2 puff Inhalation Q6H PRN [place] Honey, MD      ALPRAZolam  0 25 mg Oral 4x Daily PRN [place] Honey, MD      aluminum-magnesium hydroxide-simethicone  30 mL Oral Q6H PRN Adrianne Honey, MD      atorvastatin  20 mg Oral QPM Adrianne Honey, MD      b complex-vitamin C-folic acid  1 capsule Oral Daily With Radha Grimm MD      calcitriol  0 25 mcg Oral Daily Sharmon Denver, DO      carvedilol  25 mg Oral BID [place] Honey, MD      cholecalciferol  2,000 Units Oral Daily Sharmon Denver, DO      cinacalcet  30 mg Oral Daily Adrianne Honey, MD      dicyclomine  10 mg Oral Q6H PRN Adrianne Honey, MD      docusate sodium  100 mg Oral BID PRN [place] Honey, MD      insulin glargine  15 Units Subcutaneous HS Odalis Prater PA-C      insulin lispro  2-12 Units Subcutaneous 4x Daily (AC & HS) Odalis Prater PA-C      levothyroxine  50 mcg Oral Daily Adrianne Honey, MD      lidocaine  1 patch Topical Daily Adrianne Honey, MD      loratadine  10 mg Oral Daily [place] Honey, MD      melatonin  3 mg Oral HS [place] Macey, MD      menthol-methyl salicylate   Apply externally 4x Daily PRN Tenzin Chung PA-C      methocarbamol  500 mg Oral BID Tamie Retana MD      NIFEdipine  30 mg Oral Daily Tamie Retana MD      nystatin   Topical BID Tamie Retana MD      ondansetron  4 mg Intravenous Q6H PRN Tamie Retana MD      pantoprazole  40 mg Oral Early Morning Tamie Retana MD      polyethylene glycol  17 g Oral Daily Tamie Retana MD      remdesivir  100 mg Intravenous Q24H Tamie Retana MD 0 mg (07/10/22 2159)    sertraline  75 mg Oral Daily Tamie Retana MD      sevelamer  2,400 mg Oral TID With Meals Nena Lomeli DO      sodium polystyrene sulfonate  15 g Oral PRN Tamie Retana MD      torsemide  100 mg Oral Daily Tamie Retana MD      warfarin  9 mg Oral Daily (warfarin) Tyler Soulier, CRNP      zolpidem  5 mg Oral HS Tamie Retana MD          Today, Patient Was Seen By: Gabbi Henry PA-C    **Please Note: This note may have been constructed using a voice recognition system  **

## 2022-07-12 NOTE — ASSESSMENT & PLAN NOTE
· Continue warfarin  · INR was supratherapeutic on admission now down trending  · Coumadin increased to patient's home dose 9 mg  · Redraw INR today   Continue to trend

## 2022-07-12 NOTE — ASSESSMENT & PLAN NOTE
Lab Results   Component Value Date    HGBA1C 9 4 (H) 07/09/2022       Recent Labs     07/11/22  1800 07/11/22 2009 07/11/22 2206 07/11/22  2355   POCGLU 142* 132 72 153*       Blood Sugar Average: Last 72 hrs:  (P) 206 375   · Uncontrolled and labile in setting of poorly controlled diabetic on iv steroids for covid   · Increased sliding scale to 3 added bedtime and 2am chk dt her being labile   · May need to reduce level but will continue for now noting lunch time drop

## 2022-07-12 NOTE — QUICK NOTE
Received call from Rahat Roberts PA-C regarding patient  Will hold off on placement of temporary central line for now  SLIM to re-consult if still needed    Feel free to reach out to IR with any questions or concerns

## 2022-07-12 NOTE — ASSESSMENT & PLAN NOTE
· Pt with complaint of low back pain with radiates to RLE  · Presented to the ED on 7/9/22 s/p fall  Also found to be Covid + as of 7/9/22  · Recent MRI concerning for L4-5 bilateral septic facets and early discitis/Osteomyelitis  · Pt with hx of right foot diabetic ulcer, osteomyelitis/MRSA infection with prior tx with IV abx  Pt is s/p right foot amputation in 2021  · Imaging reviewed personally and by attending  Final results as below  · MRI Lumbar spine wo 7/11/22: There is high signal in the intervertebral disc space  There is a central/right paracentral disc herniation, protrusion type extending into the right neural foramen  Large bilateral facet effusions  Findings concerning for bilateral septic facets and early discitis/Osteomyelitis  Plan  · Continue regular neurologic checks  · Eval and mobilize per PT/OT  · Will determine if LSO brace required based on xrays  · DVT PPX: SCDs   Upright Xray of lumbar spine ordered  ID was consulted  Blood Cultures x 2 pending  ESR 7/12 wln at 26  CRP 7/12: elevated at 23 9 but decreased from 45 2 on 7/10, while pt was on Dexamethasone for Covid that has since been stopped  Per ID, will monitor with serial labs/MRI  Should changes be noted, may consider biopsy  Pt had MRI L spine wo that showed L4-5 disc herniation as well as L4-5 bilateral facet effusions concerning for septic facet joints and high signal at L4-5 disc space concerning for early discitis/osteomyelitis  Though pt noted to have L4-5 disc protrusion extending in to the right foramen, pt is not a good surgical candidate at this time in the setting of concern for OM/Discitis at L4-5 as well as multiple comorbidities including DM with HgbA1C of 9 4, ESRD on Hemodialysis, hx of DVT/PE on Coumadin and recent Covid infection  Will obtain baseline upright xrays of the lumbar spine to assess alignment of the lumbar spine     Neurosurgery will review xrays of lumbar spine when completed  Please call with any questions or concerns

## 2022-07-12 NOTE — ASSESSMENT & PLAN NOTE
Lab Results   Component Value Date    HGBA1C 9 4 (H) 07/09/2022       Recent Labs     07/12/22  0740 07/12/22  0804 07/12/22  0835 07/12/22  0921   POCGLU 73 65 70 138       Blood Sugar Average: Last 72 hrs:  (P) 171   · Uncontrolled and labile in setting of poorly controlled diabetic on iv steroids for covid   · Endocrine has been consulted   · Currently on Lantus 15 units with algorithm for sliding scale  · She has borderline hypoglycemia  · Discontinuing steroids in the setting of possible new infection  · Discussed with endocrinology    No changes at this time to insulin regimen

## 2022-07-12 NOTE — PLAN OF CARE
Post-Dialysis RN Treatment Note    Blood Pressure:  Pre 171/68 mm/Hg  Post 175/64 mmHg   EDW  124 kg    Weight:  Pre 126 kg   Post 124 2 kg   Mode of weight measurement: Bed Scale   Volume Removed  1675 ml    Treatment duration 150 minutes    NS given  No    Treatment shortened? Yes, describe: pt wanted off machine at 2 5 hrs due to restlessness and back pain   Medications given during Rx None Reported   Estimated Kt/V  Not Applicable   Access type: AV fistula   Access Issues: Yes, describe: pt very restless, moving access arm causing high art and rose pressure alarms, bfr 350 as sahara to prevent infiltration of needles    Report called to primary nurse   Yes Mariela Estes RN    HD tx plan: 3 hrs removing 2L to edw as sahara  2K bath for K 5 5 7/12  Using LFA fistula for hd       Problem: METABOLIC, FLUID AND ELECTROLYTES - ADULT  Goal: Electrolytes maintained within normal limits  Description: INTERVENTIONS:  - Monitor labs and assess patient for signs and symptoms of electrolyte imbalances  - Administer electrolyte replacement as ordered  - Monitor response to electrolyte replacements, including repeat lab results as appropriate  - Instruct patient on fluid and nutrition as appropriate  Outcome: Progressing     Problem: METABOLIC, FLUID AND ELECTROLYTES - ADULT  Goal: Fluid balance maintained  Description: INTERVENTIONS:  - Monitor labs   - Monitor I/O and WT  - Instruct patient on fluid and nutrition as appropriate  - Assess for signs & symptoms of volume excess or deficit  Outcome: Progressing

## 2022-07-12 NOTE — ASSESSMENT & PLAN NOTE
Pt with complaint of low back pain with radiates to RLE  Presented to the ED on 7/9/22 s/p fall  Also found to be Covid + as of 7/9/22     Recent MRI showed disc herniation at L4-5

## 2022-07-12 NOTE — QUICK NOTE
Endocrinology quick note:  Chart was reviewed  Unable to reach patient by room phone or mobile phone  55 yo F who is hospitalized for COVID pneumonia being treated with steroids and has steroid induced hyperglycemia in setting of type 2 DM  Hypoglycemia occurred this AM on insulin infusion and drip was discontinued  Steroids were discontinued today in setting of possible diskitis/osteomyelitis  Type 2 DM with steroid induced hyperglycemia  Most recent a1c 9 4 this admission  Started on steroids dexamethasone 6mg qD on 7/9, last dose was administered 7pm on 7/10  Home regimen: 15 lantus at bedtime, correctional novolog with meals starting around blood sugar 180  Inpatient regimen: 15 lantus and correctional coverage  Continue on current regimen  Suspect that insulin requirements were decreased on drip overnight due to retiming of dexamethasone to this morning though it was ultimately discontinued and not administered this AM      Hypothyroidism  On levothyroxine 50mcg as outpatient  Recheck TFTs as outpatient, no recent labwork available      Torei Alford DO  Endocrinology Fellow, [facility]

## 2022-07-12 NOTE — ASSESSMENT & PLAN NOTE
· Continue warfarin    · INR was supratherapeutic on admission now down trending  · Coumadin increased to patient's home dose 9 mg

## 2022-07-12 NOTE — ASSESSMENT & PLAN NOTE
· Pt on coumadin due to history of DVT, currently on hold   · INR elevated 3 66 on admission held coumadin - then resumed 07/10/2022  · Will increase Coumadin to 9 mg dosing, patient's home dose  · As INR trending downward status post 3 mg night prior    · Would have low threshold to image if change in mental status sp fall at home

## 2022-07-12 NOTE — ASSESSMENT & PLAN NOTE
Lab Results   Component Value Date    HGBA1C 9 4 (H) 07/09/2022     On Lantus and insulin sliding scale  Accu-Cheks per protocol  Blood Sugar Average: Last 72 hrs:  (P) 373 [id number]     · Place pt on insulin drip, started early hours of 7/11  due to increased blood sugars  · Lantus was discontinued  · Currently on insulin drip  · Late evening  · Claretta Litter / renal diet Patient found to be lethargic/barely responsive with blood sugar of 27  · Due to COVID precautions patient does not appear to be receiving her breakfast last 2 mornings with same hypoglycemic events  · - discussed with dietary/floor leadership -   · Patient then received 2 half amp dextrose with an increase in blood sugars  · Patient's steroids being administered late evening  Discussed with endocrinology and recommended changing steroid dosing to  9:00 a m  Agnieszka Mccloud   · Dexamethasone transition to 9:00 a m  07/12/2022 meaning patient will skip dose IV steroids today 07/10/2022

## 2022-07-12 NOTE — PLAN OF CARE
Problem: PAIN - ADULT  Goal: Verbalizes/displays adequate comfort level or baseline comfort level  Description: Interventions:  - Encourage patient to monitor pain and request assistance  - Assess pain using appropriate pain scale  - Administer analgesics based on type and severity of pain and evaluate response  - Implement non-pharmacological measures as appropriate and evaluate response  - Consider cultural and social influences on pain and pain management  - Notify physician/advanced practitioner if interventions unsuccessful or patient reports new pain  Outcome: Progressing     Problem: SAFETY ADULT  Goal: Patient will remain free of falls  Description: INTERVENTIONS:  - Educate patient/family on patient safety including physical limitations  - Instruct patient to call for assistance with activity   - Consult OT/PT to assist with strengthening/mobility   - Keep Call bell within reach  - Keep bed low and locked with side rails adjusted as appropriate  - Keep care items and personal belongings within reach  - Initiate and maintain comfort rounds  - Make Fall Risk Sign visible to staff  - Offer Toileting every  Hours, in advance of need  - Initiate/Maintain alarm  - Obtain necessary fall risk management equipment:  - Apply yellow socks and bracelet for high fall risk patients  - Consider moving patient to room near nurses station  Outcome: Progressing  Goal: Maintain or return to baseline ADL function  Description: INTERVENTIONS:  -  Assess patient's ability to carry out ADLs; assess patient's baseline for ADL function and identify physical deficits which impact ability to perform ADLs (bathing, care of mouth/teeth, toileting, grooming, dressing, etc )  - Assess/evaluate cause of self-care deficits   - Assess range of motion  - Assess patient's mobility; develop plan if impaired  - Assess patient's need for assistive devices and provide as appropriate  - Encourage maximum independence but intervene and supervise when necessary  - Involve family in performance of ADLs  - Assess for home care needs following discharge   - Consider OT consult to assist with ADL evaluation and planning for discharge  - Provide patient education as appropriate  Outcome: Progressing  Goal: Maintains/Returns to pre admission functional level  Description: INTERVENTIONS:  - Perform BMAT or MOVE assessment daily    - Set and communicate daily mobility goal to care team and patient/family/caregiver  - Collaborate with rehabilitation services on mobility goals if consulted  - Perform Range of Motion  times a day  - Reposition patient every  hours    - Dangle patient  times a day  - Stand patient  times a day  - Ambulate patient times a day  - Out of bed to chair  times a day   - Out of bed for meals  times a day  - Out of bed for toileting  - Record patient progress and toleration of activity level   Outcome: Progressing     Problem: DISCHARGE PLANNING  Goal: Discharge to home or other facility with appropriate resources  Description: INTERVENTIONS:  - Identify barriers to discharge w/patient and caregiver  - Arrange for needed discharge resources and transportation as appropriate  - Identify discharge learning needs (meds, wound care, etc )  - Arrange for interpretive services to assist at discharge as needed  - Refer to Case Management Department for coordinating discharge planning if the patient needs post-hospital services based on physician/advanced practitioner order or complex needs related to functional status, cognitive ability, or social support system  Outcome: Progressing     Problem: Prexisting or High Potential for Compromised Skin Integrity  Goal: Skin integrity is maintained or improved  Description: INTERVENTIONS:  - Identify patients at risk for skin breakdown  - Assess and monitor skin integrity  - Assess and monitor nutrition and hydration status  - Monitor labs   - Assess for incontinence   - Turn and reposition patient  - Assist with mobility/ambulation  - Relieve pressure over bony prominences  - Avoid friction and shearing  - Provide appropriate hygiene as needed including keeping skin clean and dry  - Evaluate need for skin moisturizer/barrier cream  - Collaborate with interdisciplinary team   - Patient/family teaching  - Consider wound care consult   Outcome: Progressing     Problem: Potential for Falls  Goal: Patient will remain free of falls  Description: INTERVENTIONS:  - Educate patient/family on patient safety including physical limitations  - Instruct patient to call for assistance with activity   - Consult OT/PT to assist with strengthening/mobility   - Keep Call bell within reach  - Keep bed low and locked with side rails adjusted as appropriate  - Keep care items and personal belongings within reach  - Initiate and maintain comfort rounds  - Make Fall Risk Sign visible to staff  - Offer Toileting every Hours, in advance of need  - Initiate/Maintain alarm  - Obtain necessary fall risk management equipment:  - Apply yellow socks and bracelet for high fall risk patients  - Consider moving patient to room near nurses station  Outcome: Progressing     Problem: Nutrition/Hydration-ADULT  Goal: Nutrient/Hydration intake appropriate for improving, restoring or maintaining nutritional needs  Description: Monitor and assess patient's nutrition/hydration status for malnutrition  Collaborate with interdisciplinary team and initiate plan and interventions as ordered  Monitor patient's weight and dietary intake as ordered or per policy  Utilize nutrition screening tool and intervene as necessary  Determine patient's food preferences and provide high-protein, high-caloric foods as appropriate       INTERVENTIONS:  - Monitor oral intake, urinary output, labs, and treatment plans  - Assess nutrition and hydration status and recommend course of action  - Evaluate amount of meals eaten  - Assist patient with eating if necessary   - Allow adequate time for meals  - Recommend/ encourage appropriate diets, oral nutritional supplements, and vitamin/mineral supplements  - Order, calculate, and assess calorie counts as needed  - Recommend, monitor, and adjust tube feedings and TPN/PPN based on assessed needs  - Assess need for intravenous fluids  - Provide specific nutrition/hydration education as appropriate  - Include patient/family/caregiver in decisions related to nutrition  Outcome: Progressing

## 2022-07-12 NOTE — ASSESSMENT & PLAN NOTE
· Per nursing, patient is hard stick and has blown multiple IVs   · She is not a candidate for PICC line given HD    Discussed with Nephrology regarding this  · Given possible need for antibiotics, and IV access, will consult IR for IJ placement

## 2022-07-12 NOTE — TELEMEDICINE
e-Consult (IPC)  - Interventional Radiology  Arsalan Ambrosio 56 y o  female MRN: [id number]  Unit/Bed#: [facility] 630-01 Encounter: [id number]          Interventional Radiology has been consulted to evaluate Arsalan Ambrosio    We were consulted by Internal Medicine concerning this patient with poor venous access  IP Consult to IR  Consult performed by: [facility], LOIS  Consult ordered by: Ave Quintero PA-C        07/12/22    Assessment/Recommendation:   Arsalan Ambrosio, 57y female with a pmh of HTN, T2DM, ESRD on HD and now with Covid-19 on 7/9/22    Patient with FELECIA fistula receives HD on T-T-S schedule who tested positive for Covid on 7/9  Patient has poor venous access and is going to require Remdesivir infusions along with serial lab draws  Per nephrology, patient is not a PICC candidate, would prefer an IJ  Plan -   Temporary central line placement today - timing to be determined by team availability  Patient does not need to be NPO for this procedure    Total time spent in review of data, discussion with requesting provider and rendering advice was 22 minutes     Thank you for allowing Interventional Radiology to participate in the care of Arsalan Ambrosio  Please don't hesitate to call or TigerText us with any questions       [facility]

## 2022-07-12 NOTE — ASSESSMENT & PLAN NOTE
Present on admission, increased pain  · Patient reports significant discomfort lumbar spine  · Has been seeing pain management outpatient  · MRI done inpatient showing concern for discitis/osteomyelitis  · Neurosurgery consulted appreciate recs   · Will consult ID as well   · Check blood cultures

## 2022-07-12 NOTE — ASSESSMENT & PLAN NOTE
· Pt with complaint of low back pain with radiates to RLE  · Presented to the ED on 7/9/22 s/p fall  Also found to be Covid + as of 7/9/22  · Recent MRI concerning for L4-5 bilateral septic facets and early discitis/Osteomyelitis  · Pt with hx of right foot diabetic ulcer, osteomyelitis/MRSA infection with prior tx with IV abx  Pt is s/p right foot amputation in 2021  · Imaging reviewed personally and by attending  Final results as below  · MRI Lumbar spine wo 7/11/22: There is high signal in the intervertebral disc space  There is a central/right paracentral disc herniation, protrusion type extending into the right neural foramen  Large bilateral facet effusions  Findings concerning for bilateral septic facets and early discitis/Osteomyelitis  Plan  · Continue regular neurologic checks  · Eval and mobilize per PT/OT  · At this time, no significant erosive changes or instability noted  LSO brace not required  Should further changes be noted in the future, pt may need brace then  · DVT PPX: SCDs   Upright Xray of lumbar spine ordered  ID was consulted  Blood Cultures x 2 pending  ESR 7/12 wln at 26  CRP 7/12: elevated at 23 9 but decreased from 45 2 on 7/10, while pt was on Dexamethasone for Covid that has since been stopped  Per ID, will monitor with serial labs/MRI  Should changes be noted, may consider biopsy in the future if needed  No neurosurgical intervention is anticipated at this time  Will obtain baseline upright xrays of the lumbar spine  Neurosurgery will review xrays of lumbar spine when completed  Anticipate f/u in 4 week with repeat xrays lumbar and MRI lumbar spine w wo  Please call with any questions or concerns

## 2022-07-12 NOTE — ASSESSMENT & PLAN NOTE
Present on admission, increased pain  · Patient reports significant discomfort lumbar spine  · Has been seeing pain management outpatient  · Last visit 06/16/2022  · Recommendations to take pregabalin as prescribed 1 daily and 1 on hemodialysis  · EMG of the right lower extremity was ordered  · Medication options limited secondary to hemodialysis/follow-up in 8 weeks  · Discussed with neurosurgery MRI without contrast ordered of lumbar spine as prior imaging of lumbar spine was reviewed  · Once MRI complete place consult for Neurosurgery

## 2022-07-13 ENCOUNTER — APPOINTMENT (INPATIENT)
Dept: RADIOLOGY | Facility: HOSPITAL | Age: 57
DRG: 177 | End: 2022-07-13
Payer: MEDICARE

## 2022-07-13 LAB
ANION GAP SERPL CALCULATED.3IONS-SCNC: 5 MMOL/L (ref 4–13)
BASOPHILS # BLD AUTO: 0.02 THOUSANDS/ΜL (ref 0–0.1)
BASOPHILS NFR BLD AUTO: 0 % (ref 0–1)
BUN SERPL-MCNC: 56 MG/DL (ref 5–25)
CALCIUM SERPL-MCNC: 7.9 MG/DL (ref 8.3–10.1)
CHLORIDE SERPL-SCNC: 103 MMOL/L (ref 100–108)
CO2 SERPL-SCNC: 27 MMOL/L (ref 21–32)
CREAT SERPL-MCNC: 8.61 MG/DL (ref 0.6–1.3)
EOSINOPHIL # BLD AUTO: 0.07 THOUSAND/ΜL (ref 0–0.61)
EOSINOPHIL NFR BLD AUTO: 1 % (ref 0–6)
ERYTHROCYTE [DISTWIDTH] IN BLOOD BY AUTOMATED COUNT: 13.2 % (ref 11.6–15.1)
GFR SERPL CREATININE-BSD FRML MDRD: 4 ML/MIN/1.73SQ M
GLUCOSE SERPL-MCNC: 107 MG/DL (ref 65–140)
GLUCOSE SERPL-MCNC: 115 MG/DL (ref 65–140)
GLUCOSE SERPL-MCNC: 144 MG/DL (ref 65–140)
GLUCOSE SERPL-MCNC: 69 MG/DL (ref 65–140)
GLUCOSE SERPL-MCNC: 76 MG/DL (ref 65–140)
GLUCOSE SERPL-MCNC: 93 MG/DL (ref 65–140)
HCT VFR BLD AUTO: 28.8 % (ref 34.8–46.1)
HGB BLD-MCNC: 9.1 G/DL (ref 11.5–15.4)
IMM GRANULOCYTES # BLD AUTO: 0.03 THOUSAND/UL (ref 0–0.2)
IMM GRANULOCYTES NFR BLD AUTO: 1 % (ref 0–2)
INR PPP: 1.62 (ref 0.84–1.19)
LYMPHOCYTES # BLD AUTO: 1.34 THOUSANDS/ΜL (ref 0.6–4.47)
LYMPHOCYTES NFR BLD AUTO: 24 % (ref 14–44)
MAGNESIUM SERPL-MCNC: 2.3 MG/DL (ref 1.6–2.6)
MCH RBC QN AUTO: 31.6 PG (ref 26.8–34.3)
MCHC RBC AUTO-ENTMCNC: 31.6 G/DL (ref 31.4–37.4)
MCV RBC AUTO: 100 FL (ref 82–98)
MONOCYTES # BLD AUTO: 0.63 THOUSAND/ΜL (ref 0.17–1.22)
MONOCYTES NFR BLD AUTO: 11 % (ref 4–12)
MRSA NOSE QL CULT: ABNORMAL
MRSA NOSE QL CULT: ABNORMAL
NEUTROPHILS # BLD AUTO: 3.46 THOUSANDS/ΜL (ref 1.85–7.62)
NEUTS SEG NFR BLD AUTO: 63 % (ref 43–75)
NRBC BLD AUTO-RTO: 0 /100 WBCS
PHOSPHATE SERPL-MCNC: 7 MG/DL (ref 2.7–4.5)
PLATELET # BLD AUTO: 126 THOUSANDS/UL (ref 149–390)
PMV BLD AUTO: 10.8 FL (ref 8.9–12.7)
POTASSIUM SERPL-SCNC: 5.4 MMOL/L (ref 3.5–5.3)
PROCALCITONIN SERPL-MCNC: 0.45 NG/ML
PROTHROMBIN TIME: 18.5 SECONDS (ref 11.6–14.5)
RBC # BLD AUTO: 2.88 MILLION/UL (ref 3.81–5.12)
SODIUM SERPL-SCNC: 135 MMOL/L (ref 136–145)
WBC # BLD AUTO: 5.55 THOUSAND/UL (ref 4.31–10.16)

## 2022-07-13 PROCEDURE — 72100 X-RAY EXAM L-S SPINE 2/3 VWS: CPT

## 2022-07-13 PROCEDURE — 85025 COMPLETE CBC W/AUTO DIFF WBC: CPT | Performed by: PHYSICIAN ASSISTANT

## 2022-07-13 PROCEDURE — 82948 REAGENT STRIP/BLOOD GLUCOSE: CPT

## 2022-07-13 PROCEDURE — 83735 ASSAY OF MAGNESIUM: CPT | Performed by: PHYSICIAN ASSISTANT

## 2022-07-13 PROCEDURE — 99232 SBSQ HOSP IP/OBS MODERATE 35: CPT | Performed by: INTERNAL MEDICINE

## 2022-07-13 PROCEDURE — 84145 PROCALCITONIN (PCT): CPT | Performed by: PHYSICIAN ASSISTANT

## 2022-07-13 PROCEDURE — 85610 PROTHROMBIN TIME: CPT | Performed by: PHYSICIAN ASSISTANT

## 2022-07-13 PROCEDURE — 80048 BASIC METABOLIC PNL TOTAL CA: CPT | Performed by: PHYSICIAN ASSISTANT

## 2022-07-13 PROCEDURE — 84100 ASSAY OF PHOSPHORUS: CPT | Performed by: PHYSICIAN ASSISTANT

## 2022-07-13 PROCEDURE — 99232 SBSQ HOSP IP/OBS MODERATE 35: CPT | Performed by: PHYSICIAN ASSISTANT

## 2022-07-13 RX ORDER — INSULIN LISPRO 100 [IU]/ML
1-5 INJECTION, SOLUTION INTRAVENOUS; SUBCUTANEOUS
Status: DISCONTINUED | OUTPATIENT
Start: 2022-07-13 | End: 2022-07-13

## 2022-07-13 RX ORDER — WARFARIN SODIUM 3 MG/1
9 TABLET ORAL
Status: DISCONTINUED | OUTPATIENT
Start: 2022-07-14 | End: 2022-07-14 | Stop reason: HOSPADM

## 2022-07-13 RX ORDER — INSULIN GLARGINE 100 [IU]/ML
12 INJECTION, SOLUTION SUBCUTANEOUS
Status: DISCONTINUED | OUTPATIENT
Start: 2022-07-13 | End: 2022-07-14 | Stop reason: HOSPADM

## 2022-07-13 RX ORDER — INSULIN LISPRO 100 [IU]/ML
1-5 INJECTION, SOLUTION INTRAVENOUS; SUBCUTANEOUS
Status: DISCONTINUED | OUTPATIENT
Start: 2022-07-13 | End: 2022-07-14 | Stop reason: HOSPADM

## 2022-07-13 RX ORDER — WARFARIN SODIUM 5 MG/1
10 TABLET ORAL
Status: COMPLETED | OUTPATIENT
Start: 2022-07-13 | End: 2022-07-13

## 2022-07-13 RX ADMIN — INSULIN GLARGINE 12 UNITS: 100 INJECTION, SOLUTION SUBCUTANEOUS at 22:11

## 2022-07-13 RX ADMIN — CARVEDILOL 25 MG: 25 TABLET, FILM COATED ORAL at 08:17

## 2022-07-13 RX ADMIN — CARVEDILOL 25 MG: 25 TABLET, FILM COATED ORAL at 17:54

## 2022-07-13 RX ADMIN — OXYCODONE HYDROCHLORIDE 5 MG: 5 TABLET ORAL at 22:27

## 2022-07-13 RX ADMIN — LORATADINE 10 MG: 10 TABLET ORAL at 08:17

## 2022-07-13 RX ADMIN — REMDESIVIR 100 MG: 100 INJECTION, POWDER, LYOPHILIZED, FOR SOLUTION INTRAVENOUS at 22:11

## 2022-07-13 RX ADMIN — OXYCODONE HYDROCHLORIDE 5 MG: 5 TABLET ORAL at 10:11

## 2022-07-13 RX ADMIN — OXYCODONE HYDROCHLORIDE 5 MG: 5 TABLET ORAL at 01:48

## 2022-07-13 RX ADMIN — ACETAMINOPHEN 650 MG: 325 TABLET, FILM COATED ORAL at 03:51

## 2022-07-13 RX ADMIN — Medication 2000 UNITS: at 08:17

## 2022-07-13 RX ADMIN — METHOCARBAMOL 500 MG: 500 TABLET ORAL at 08:17

## 2022-07-13 RX ADMIN — OXYCODONE HYDROCHLORIDE 5 MG: 5 TABLET ORAL at 06:16

## 2022-07-13 RX ADMIN — ONDANSETRON 4 MG: 2 INJECTION INTRAMUSCULAR; INTRAVENOUS at 09:38

## 2022-07-13 RX ADMIN — SERTRALINE HYDROCHLORIDE 75 MG: 50 TABLET ORAL at 08:17

## 2022-07-13 RX ADMIN — TORSEMIDE 100 MG: 20 TABLET ORAL at 08:17

## 2022-07-13 RX ADMIN — PANTOPRAZOLE SODIUM 40 MG: 40 TABLET, DELAYED RELEASE ORAL at 06:07

## 2022-07-13 RX ADMIN — Medication 1 CAPSULE: at 17:55

## 2022-07-13 RX ADMIN — NYSTATIN: 100000 POWDER TOPICAL at 08:18

## 2022-07-13 RX ADMIN — NIFEDIPINE 30 MG: 30 TABLET, FILM COATED, EXTENDED RELEASE ORAL at 08:17

## 2022-07-13 RX ADMIN — LIDOCAINE 5% 1 PATCH: 700 PATCH TOPICAL at 08:17

## 2022-07-13 RX ADMIN — OXYCODONE HYDROCHLORIDE 5 MG: 5 TABLET ORAL at 15:52

## 2022-07-13 RX ADMIN — METHOCARBAMOL 500 MG: 500 TABLET ORAL at 17:54

## 2022-07-13 RX ADMIN — WARFARIN SODIUM 10 MG: 5 TABLET ORAL at 17:55

## 2022-07-13 RX ADMIN — SEVELAMER HYDROCHLORIDE 2400 MG: 800 TABLET, FILM COATED PARENTERAL at 08:17

## 2022-07-13 RX ADMIN — SEVELAMER HYDROCHLORIDE 2400 MG: 800 TABLET, FILM COATED PARENTERAL at 17:55

## 2022-07-13 RX ADMIN — NYSTATIN: 100000 POWDER TOPICAL at 18:08

## 2022-07-13 RX ADMIN — LEVOTHYROXINE SODIUM 50 MCG: 50 TABLET ORAL at 08:17

## 2022-07-13 RX ADMIN — SEVELAMER HYDROCHLORIDE 2400 MG: 800 TABLET, FILM COATED PARENTERAL at 13:01

## 2022-07-13 RX ADMIN — ACETAMINOPHEN 650 MG: 325 TABLET, FILM COATED ORAL at 10:11

## 2022-07-13 RX ADMIN — CINACALCET 30 MG: 30 TABLET, FILM COATED ORAL at 08:17

## 2022-07-13 RX ADMIN — CALCITRIOL CAPSULES 0.25 MCG 0.25 MCG: 0.25 CAPSULE ORAL at 08:17

## 2022-07-13 RX ADMIN — ZOLPIDEM TARTRATE 5 MG: 5 TABLET, COATED ORAL at 22:11

## 2022-07-13 RX ADMIN — ATORVASTATIN CALCIUM 20 MG: 20 TABLET, FILM COATED ORAL at 17:54

## 2022-07-13 RX ADMIN — Medication 3 MG: at 22:11

## 2022-07-13 NOTE — ASSESSMENT & PLAN NOTE
· Per nursing, patient is hard stick and has blown multiple IVs   · She is not a candidate for PICC line given HD    Discussed with Nephrology regarding this  · IR consult discontinued as patient stable for discharge per ID, NSX, and nephro

## 2022-07-13 NOTE — ASSESSMENT & PLAN NOTE
· Pt on coumadin due to history of DVT, currently on hold   · INR elevated 3 66 on admission held coumadin - then resumed 07/10/2022    · On 9 mg coumadin at home   · INR today 1 6   · Will give 10 mg tonight   · INR in am

## 2022-07-13 NOTE — ASSESSMENT & PLAN NOTE
Lab Results   Component Value Date    EGFR 4 07/13/2022    EGFR 3 07/12/2022    EGFR 3 07/11/2022    CREATININE 8 61 (H) 07/13/2022    CREATININE 11 70 (H) 07/12/2022    CREATININE 10 10 (H) 07/11/2022   · Nephrology consult for hemodialysis    · Labs on admission with noted hyperkalemia  · Treated with Kayexalate

## 2022-07-13 NOTE — PLAN OF CARE
Problem: PAIN - ADULT  Goal: Verbalizes/displays adequate comfort level or baseline comfort level  Description: Interventions:  - Encourage patient to monitor pain and request assistance  - Assess pain using appropriate pain scale  - Administer analgesics based on type and severity of pain and evaluate response  - Implement non-pharmacological measures as appropriate and evaluate response  - Consider cultural and social influences on pain and pain management  - Notify physician/advanced practitioner if interventions unsuccessful or patient reports new pain  Outcome: Progressing     Problem: SAFETY ADULT  Goal: Patient will remain free of falls  Description: INTERVENTIONS:  - Educate patient/family on patient safety including physical limitations  - Instruct patient to call for assistance with activity   - Consult OT/PT to assist with strengthening/mobility   - Keep Call bell within reach  - Keep bed low and locked with side rails adjusted as appropriate  - Keep care items and personal belongings within reach  - Initiate and maintain comfort rounds  - Make Fall Risk Sign visible to staff  - Offer Toileting every  Hours, in advance of need  - Initiate/Maintain alarm  - Obtain necessary fall risk management equipment:   - Apply yellow socks and bracelet for high fall risk patients  - Consider moving patient to room near nurses station  Outcome: Progressing  Goal: Maintain or return to baseline ADL function  Description: INTERVENTIONS:  -  Assess patient's ability to carry out ADLs; assess patient's baseline for ADL function and identify physical deficits which impact ability to perform ADLs (bathing, care of mouth/teeth, toileting, grooming, dressing, etc )  - Assess/evaluate cause of self-care deficits   - Assess range of motion  - Assess patient's mobility; develop plan if impaired  - Assess patient's need for assistive devices and provide as appropriate  - Encourage maximum independence but intervene and supervise when necessary  - Involve family in performance of ADLs  - Assess for home care needs following discharge   - Consider OT consult to assist with ADL evaluation and planning for discharge  - Provide patient education as appropriate  Outcome: Progressing  Goal: Maintains/Returns to pre admission functional level  Description: INTERVENTIONS:  - Perform BMAT or MOVE assessment daily    - Set and communicate daily mobility goal to care team and patient/family/caregiver  - Collaborate with rehabilitation services on mobility goals if consulted  - Perform Range of Motion  times a day  - Reposition patient every  hours    - Dangle patient  times a day  - Stand patient  times a day  - Ambulate patient  times a day  - Out of bed to chair  times a day   - Out of bed for meals  times a day  - Out of bed for toileting  - Record patient progress and toleration of activity level   Outcome: Progressing     Problem: DISCHARGE PLANNING  Goal: Discharge to home or other facility with appropriate resources  Description: INTERVENTIONS:  - Identify barriers to discharge w/patient and caregiver  - Arrange for needed discharge resources and transportation as appropriate  - Identify discharge learning needs (meds, wound care, etc )  - Arrange for interpretive services to assist at discharge as needed  - Refer to Case Management Department for coordinating discharge planning if the patient needs post-hospital services based on physician/advanced practitioner order or complex needs related to functional status, cognitive ability, or social support system  Outcome: Progressing     Problem: Prexisting or High Potential for Compromised Skin Integrity  Goal: Skin integrity is maintained or improved  Description: INTERVENTIONS:  - Identify patients at risk for skin breakdown  - Assess and monitor skin integrity  - Assess and monitor nutrition and hydration status  - Monitor labs   - Assess for incontinence   - Turn and reposition patient  - Assist with mobility/ambulation  - Relieve pressure over bony prominences  - Avoid friction and shearing  - Provide appropriate hygiene as needed including keeping skin clean and dry  - Evaluate need for skin moisturizer/barrier cream  - Collaborate with interdisciplinary team   - Patient/family teaching  - Consider wound care consult   Outcome: Progressing     Problem: Potential for Falls  Goal: Patient will remain free of falls  Description: INTERVENTIONS:  - Educate patient/family on patient safety including physical limitations  - Instruct patient to call for assistance with activity   - Consult OT/PT to assist with strengthening/mobility   - Keep Call bell within reach  - Keep bed low and locked with side rails adjusted as appropriate  - Keep care items and personal belongings within reach  - Initiate and maintain comfort rounds  - Make Fall Risk Sign visible to staff  - Offer Toileting every  Hours, in advance of need  - Initiate/Maintain alarm  - Obtain necessary fall risk management equipment  - Apply yellow socks and bracelet for high fall risk patients  - Consider moving patient to room near nurses station  Outcome: Progressing     Problem: Nutrition/Hydration-ADULT  Goal: Nutrient/Hydration intake appropriate for improving, restoring or maintaining nutritional needs  Description: Monitor and assess patient's nutrition/hydration status for malnutrition  Collaborate with interdisciplinary team and initiate plan and interventions as ordered  Monitor patient's weight and dietary intake as ordered or per policy  Utilize nutrition screening tool and intervene as necessary  Determine patient's food preferences and provide high-protein, high-caloric foods as appropriate       INTERVENTIONS:  - Monitor oral intake, urinary output, labs, and treatment plans  - Assess nutrition and hydration status and recommend course of action  - Evaluate amount of meals eaten  - Assist patient with eating if necessary   - Allow adequate time for meals  - Recommend/ encourage appropriate diets, oral nutritional supplements, and vitamin/mineral supplements  - Order, calculate, and assess calorie counts as needed  - Recommend, monitor, and adjust tube feedings and TPN/PPN based on assessed needs  - Assess need for intravenous fluids  - Provide specific nutrition/hydration education as appropriate  - Include patient/family/caregiver in decisions related to nutrition  Outcome: Progressing     Problem: METABOLIC, FLUID AND ELECTROLYTES - ADULT  Goal: Electrolytes maintained within normal limits  Description: INTERVENTIONS:  - Monitor labs and assess patient for signs and symptoms of electrolyte imbalances  - Administer electrolyte replacement as ordered  - Monitor response to electrolyte replacements, including repeat lab results as appropriate  - Instruct patient on fluid and nutrition as appropriate  Outcome: Progressing  Goal: Fluid balance maintained  Description: INTERVENTIONS:  - Monitor labs   - Monitor I/O and WT  - Instruct patient on fluid and nutrition as appropriate  - Assess for signs & symptoms of volume excess or deficit  Outcome: Progressing

## 2022-07-13 NOTE — PROGRESS NOTES
Progress Note - Infectious Disease   Sabino Mercedes 56 y o  female MRN: [id number]  Unit/Bed#: [facility] 630-01 Encounter: [id number]      Impression/Recommendations:  1  Abnormal lumbar spine MRI with marrow edema at L4-5 with high signal in the intervertebral disc  Although these findings are not suggestive of diskitis/vertebral osteomyelitis, radiologist suggests on reading but this may be associated with early diskitis/vertebral osteomyelitis  Patient has no clinical signs suggestive of active infection, other than her COVID   ESR is normal   CRP is mildly elevated but most likely secondary to COVID  I suspect the patient's MRI changes is all secondary to mechanical reason rather than diskitis/vertebral osteomyelitis  With minimal findings of clinical diskitis/vertebral osteomyelitis, the best course of action would be monitor patient with serial MRIs and ESR  Recommend repeating lumbar spine MRI and ESR in 1 month  Otherwise, monitor low back pain for now  If MRI changes worsen or ESR rises, can consider lumbar spine biopsy then  No indication for empiric antibiotic      2  Mild COVID, on remdesivir and previously on dexamethasone, which have been discontinued  Patient is clinically much improved  She remains on low level O2 support, although she is not wearing her nasal cannula  Patient is status post COVID vaccination x3  She has COVID exposure at home  Complete remdesivir course today  Monitor respiratory symptoms      3  Chronic low back pain with symptoms suggestive of radiculopathy  She is being followed by pain management and is status post lumbar injection  Neurosurgery evaluation and follow-up      4  ESRD, on HD      5  DM, type 2, with poorly controlled hemoglobin A1c      Discussed with patient in detail regarding the above plan  Discussed with neurosurgery service earlier  Discussed with slim service  Okay for discharge from ID viewpoint      Antibiotics:  None    Subjective:  Patient denies dyspnea at rest   Cough mild, mostly nonproductive  Stable lower back and hip pain, very well controlled with narcotics  Temperature stays down  No chills  No diarrhea  Objective:  Vitals:  Temp:  [97 8 °F (36 6 °C)-99 1 °F (37 3 °C)] 98 °F (36 7 °C)  HR:  [62-69] 62  Resp:  [18-20] 18  BP: (132-175)/(50-85) 136/53  SpO2:  [90 %-100 %] 100 %  Temp (24hrs), Av 2 °F (36 8 °C), Min:97 8 °F (36 6 °C), Max:99 1 °F (37 3 °C)  Current: Temperature: 98 °F (36 7 °C)    Physical Exam:     General: Awake, alert, cooperative, no distress  Neck:  Supple  No mass  No lymphadenopathy  Lungs: Expansion symmetric, no rales, no wheezing, respirations unlabored  Heart:  Regular rate and rhythm, S1 and S2 normal, no murmur  Abdomen: Soft, nondistended, non-tender, bowel sounds active all four quadrants, no masses, no organomegaly  Extremities: No edema  No erythema/warmth  No ulcer  Nontender to palpation  Skin:  No rash  Neuro: Moves all extremities  Invasive Devices  Report    Peripheral Intravenous Line  Duration           Peripheral IV 22 Right;Ventral (anterior) Forearm <1 day          Line  Duration           Hemodialysis AV Fistula 18 Left Forearm 1603 days                Labs studies:   I have personally reviewed pertinent labs  Results from last 7 days   Lab Units 22  1048 22  0700 07/10/22  1342 22  1957 22  1932   POTASSIUM mmol/L 5 5* 5 7* 5 5*   < >  --    CHLORIDE mmol/L 102 101 101   < >  --    CO2 mmol/L 20* 23 27   < >  --    CO2, I-STAT mmol/L  --   --   --   --  33*   BUN mg/dL 94* 86* 68*   < >  --    CREATININE mg/dL 11 70* 10 10* 8 83*   < >  --    EGFR ml/min/1 73sq m 3 3 4   < >  --    GLUCOSE, ISTAT mg/dl  --   --   --   --  319*   CALCIUM mg/dL 7 8* 8 1* 8 4   < >  --    AST U/L  --  11 13  --   --    ALT U/L  --  17 18  --   --    ALK PHOS U/L  --  133* 123*  --   --     < > = values in this interval not displayed       Results from last 7 days   Lab Units 07/13/22  0832 07/12/22  1048 07/10/22  1342   WBC Thousand/uL 5 55 5 78 5 27   HEMOGLOBIN g/dL 9 1* 8 6* 8 6*   PLATELETS Thousands/uL 126* 135* 149     Results from last 7 days   Lab Units 07/12/22  1041   BLOOD CULTURE  Received in Microbiology Lab  Culture in Progress  Received in Microbiology Lab  Culture in Progress  Imaging Studies:   I have personally reviewed pertinent imaging study reports and images in PACS  EKG, Pathology, and Other Studies:   I have personally reviewed pertinent reports

## 2022-07-13 NOTE — ASSESSMENT & PLAN NOTE
· Patient started on COVID-19 mild protocol  · Dex stopped due to below   · remdesivir to finish today   · Currently without oxygen needs - ambulates w/o desatting per nursing   · Will discontinue steroids in the setting of concern for spinal diskitis/osteomyelitis  · Labs per COVID protocol

## 2022-07-13 NOTE — ASSESSMENT & PLAN NOTE
Present on admission, increased pain  · Patient reports significant discomfort lumbar spine  · Has been seeing pain management outpatient  · MRI done inpatient showing concern for discitis/osteomyelitis  · Both neurosurgery and ID consulted - plan for repeat MRI in 1 month as an outpatient with repeat ESR  · If changes can plan for outpatient biopsy

## 2022-07-13 NOTE — QUICK NOTE
Patient is stable from Nephrology standpoint  Dialyze against 2 potassium bath yesterday  Follow up a m  BMP  Next hemodialysis tomorrow

## 2022-07-13 NOTE — ASSESSMENT & PLAN NOTE
Lab Results   Component Value Date    HGBA1C 9 4 (H) 07/09/2022       Recent Labs     07/12/22  2047 07/13/22  0237 07/13/22  0817 07/13/22  1124   POCGLU 259* 107 69 115       Blood Sugar Average: Last 72 hrs:  (P) 157 [id number]   · Uncontrolled and labile in setting of poorly controlled diabetic on iv steroids for covid   · Endocrine has been consulted   · Currently on Lantus 15 units with algorithm for sliding scale  · She has borderline hypoglycemia in the AM   · Discontinued steroids in the setting of possible new infection  · Discussed with endocrinology    No changes at this time to insulin regimen

## 2022-07-13 NOTE — PROGRESS NOTES
TeleProgress Note -   Navneet Real 56 y o  female MRN: [id number]  Unit/Bed#: [facility] 630-01 Encounter: [id number]      REQUIRED DOCUMENTATION:     1  This service was provided via Telemedicine  2  Provider located at [facility]  3  TeleMed provider: Laura Elizabeth DO   4  Identify all parties in room with patient during tele consult:  N/A  5  After connecting through [employer], patient was identified by name and date of birth and assistant checked wristband  Patient was then informed that this was a Telemedicine visit and that the exam was being conducted confidentially over secure lines  My office door was closed  No one else was in the room  Patient acknowledged consent and understanding of privacy and security of the Telemedicine visit, and gave us permission to have the assistant stay in the room in order to assist with the history and to conduct the exam   I informed the patient that I have reviewed their record in Epic and presented the opportunity for them to ask any questions regarding the visit today  The patient agreed to participate  Assessment:  Type 2 DM with steroid induced hyperglycemia  Most recent a1c 9 4 this admission  Temporarily on steroids dexamethasone 6mg qD started on 7/9, last dose was administered 7pm on 7/10  Home regimen: 15 lantus at bedtime, correctional novolog with meals starting around blood sugar 180  Inpatient regimen: 15 lantus and correctional coverage algorithm 4  Decrease to 12 units lantus and decrease correctional coverage to algorithm 1      Hypothyroidism  On levothyroxine 50mcg as outpatient  Recheck TFTs as outpatient, no recent labwork available      Subjective:   Discussed with patient by phone  She reports poor appetite and overall feeling unwell with pain in her side       Objective:     Temp:  [97 8 °F (36 6 °C)-99 1 °F (37 3 °C)] 98 2 °F (36 8 °C)  HR:  [62-69] 62  Resp:  [18-20] 20  BP: (132-175)/(50-85) 160/64  SpO2:  [90 %-100 %] 100 %      Intake/Output Summary (Last 24 hours) at 7/13/2022 1421  Last data filed at 7/12/2022 1630  Gross per 24 hour   Intake 300 ml   Output 2175 ml   Net -1875 ml       Physical Exam: not done as pt is on COVID19 precautions    I have personally reviewed pertinent imaging and laboratory results  Counseling / Coordination of Care Time: 20 minutes

## 2022-07-13 NOTE — ASSESSMENT & PLAN NOTE
· Continue warfarin  · INR was supratherapeutic on admission now down trending  · Coumadin increased to patient's home dose 9 mg  · INR subtherapeutic today     · Will increase coumadin to 10 mg   · Morning INR

## 2022-07-13 NOTE — ASSESSMENT & PLAN NOTE
· Probably in setting of covid vs hemo pt   · Started on mild COVID protocol    · Currently off oxygen - ambulates without desatting per nursing

## 2022-07-13 NOTE — TREATMENT PLAN
Neurosurgery treatment plan     Pt obtained upright xrays of the lumbar spine today that show anterolisthesis of L4 on L5 that was not noted on recent CT or MRI  Concern for Osteomyelitis/Discitis of lumbar region  Assessment & Plan  · Pt with complaint of low back pain and radicular pain in the RLE  · Presented to the ED on 7/9/22 s/p fall  Also found to be Covid + as of 7/9/22  · Recent MRI concerning for L4-5 bilateral septic facets and early discitis/Osteomyelitis  · Pt with hx of right foot diabetic ulcer, osteomyelitis/MRSA infection with prior tx with IV abx  Pt is s/p right foot amputation in 2021        · Imaging reviewed personally and by attending  Final results as below  ? MRI Lumbar spine wo 7/11/22: There is high signal in the intervertebral disc space  Aidan Champ is a central/right paracentral disc herniation, protrusion type extending into the right neural foramen   Large bilateral facet effusions  Findings concerning for bilateral septic facets and early discitis/Osteomyelitis  ? Xray lumbar spine 7/13/22: Anterolisthesis at L4-L5, this was not seen on recent MRI or CT  There is intervertebral disc height loss at the same level      Plan  · Continue regular neurologic checks  · Eval and mobilize per PT/OT  · Given the anterolisthesis of L4 on 5 not previously noted on CT/MRI, will recommend LSO brace   · LSO brace to be worn when OOB and HOB > 45 degrees  · DVT PPX: SCDs   · ID was consulted  · Blood Cultures x 2: neg x 24 hrs  · ESR 7/12 wln at 26    · CRP 7/12: elevated at 23 9 but decreased from 45 2 on 7/10, while pt was on Dexamethasone for Covid that has since been stopped  · Given no definitive diagnosis, per ID, will monitor with serial labs/MRI      · Though pt noted to have L4-5 disc protrusion extending in to the right foramen, pt is not a good surgical candidate at this time in the setting of concern for OM/Discitis at L4-5 as well as multiple comorbidities including DM with HgbA1C of 9 4, ESRD on Hemodialysis, hx of DVT/PE on Coumadin and recent Covid infection  · Pt had MRI L spine without contrast concerning for septic facet joints and high signal at L4-5 disc space concerning for early discitis/osteomyelitis  · The anterolisthesis noted on xrays at L4-5 is concerning for dynamic instability and more concerning for OM/Discitis at L4-5  Recommend further evaluation with MRI Lumbar spine with contrast  Discussed with nephrology  Pt needs to have hemodialysis after the MRI  Discussed with nephrology  Nephrology will help to arrange for timing for the MRI to be done  MRI department also made aware  · Neurosurgery will review the MRI lumbar spine with contrast when completed  Please call with any questions or concerns in the interim          Lumbar radiculopathy  Assessment & Plan  See plan above       Low back pain with sciatica  Assessment & Plan  See plan above     ESRD on hemodialysis  Assessment & Plan        Lab Results   Component Value Date     EGFR 3 07/12/2022     EGFR 3 07/11/2022     EGFR 4 07/10/2022     CREATININE 11 70 (H) 07/12/2022     CREATININE 10 10 (H) 07/11/2022     CREATININE 8 83 (H) 07/10/2022         Diabetic polyneuropathy associated with type 2 diabetes mellitus [facility])  Assessment & Plan        Lab Results   Component Value Date     HGBA1C 9 4 (H) 07/09/2022                Recent Labs     07/12/22  0804 07/12/22  0835 07/12/22  0921 07/12/22  1204   POCGLU 65 70 138 143*         Blood Sugar Average: Last 72 hrs:  (P) 421 [id number]      Endocrinology following      Pulmonary embolus [facility])  Assessment & Plan  Patient with history of DVT and PE on Coumadin      * COVID-19  Assessment & Plan  Found to be COVID positive on 07/09/2022    Management per primary team      *  Morbid Obesity  Assessment & Plan  BMI 46 12  Recommend weight management

## 2022-07-13 NOTE — PLAN OF CARE
Problem: PAIN - ADULT  Goal: Verbalizes/displays adequate comfort level or baseline comfort level  Description: Interventions:  - Encourage patient to monitor pain and request assistance  - Assess pain using appropriate pain scale  - Administer analgesics based on type and severity of pain and evaluate response  - Implement non-pharmacological measures as appropriate and evaluate response  - Consider cultural and social influences on pain and pain management  - Notify physician/advanced practitioner if interventions unsuccessful or patient reports new pain  Outcome: Progressing     Problem: SAFETY ADULT  Goal: Patient will remain free of falls  Description: INTERVENTIONS:  - Educate patient/family on patient safety including physical limitations  - Instruct patient to call for assistance with activity   - Consult OT/PT to assist with strengthening/mobility   - Keep Call bell within reach  - Keep bed low and locked with side rails adjusted as appropriate  - Keep care items and personal belongings within reach  - Initiate and maintain comfort rounds  - Make Fall Risk Sign visible to staff  - Apply yellow socks and bracelet for high fall risk patients  - Consider moving patient to room near nurses station  Outcome: Progressing  Goal: Maintain or return to baseline ADL function  Description: INTERVENTIONS:  -  Assess patient's ability to carry out ADLs; assess patient's baseline for ADL function and identify physical deficits which impact ability to perform ADLs (bathing, care of mouth/teeth, toileting, grooming, dressing, etc )  - Assess/evaluate cause of self-care deficits   - Assess range of motion  - Assess patient's mobility; develop plan if impaired  - Assess patient's need for assistive devices and provide as appropriate  - Encourage maximum independence but intervene and supervise when necessary  - Involve family in performance of ADLs  - Assess for home care needs following discharge   - Consider OT consult to assist with ADL evaluation and planning for discharge  - Provide patient education as appropriate  Outcome: Progressing  Goal: Maintains/Returns to pre admission functional level  Description: INTERVENTIONS:  - Perform BMAT or MOVE assessment daily    - Set and communicate daily mobility goal to care team and patient/family/caregiver     - Collaborate with rehabilitation services on mobility goals if consulted  - Out of bed for toileting  - Record patient progress and toleration of activity level   Outcome: Progressing     Problem: Potential for Falls  Goal: Patient will remain free of falls  Description: INTERVENTIONS:  - Educate patient/family on patient safety including physical limitations  - Instruct patient to call for assistance with activity   - Consult OT/PT to assist with strengthening/mobility   - Keep Call bell within reach  - Keep bed low and locked with side rails adjusted as appropriate  - Keep care items and personal belongings within reach  - Initiate and maintain comfort rounds  - Make Fall Risk Sign visible to staff  - Apply yellow socks and bracelet for high fall risk patients  - Consider moving patient to room near nurses station  Outcome: Progressing

## 2022-07-13 NOTE — PROGRESS NOTES
[facility]  Progress Note Emir Solano 1965, 56 y o  female MRN: [id number]  Unit/Bed#: [facility] 630-01 Encounter: [id number]  Primary Care Provider: Jose Alfredo Oquendo MD   Date and time admitted to hospital: 7/9/2022  6:33 PM    * COVID-19  Assessment & Plan  · Patient started on COVID-19 mild protocol  · Dex stopped due to below   · remdesivir to finish today   · Currently without oxygen needs - ambulates w/o desatting per nursing   · Will discontinue steroids in the setting of concern for spinal diskitis/osteomyelitis  · Labs per COVID protocol  Hypoxia  Assessment & Plan  · Probably in setting of covid vs hemo pt   · Started on mild COVID protocol  · Currently off oxygen - ambulates without desatting per nursing     Lumbar radiculopathy  Assessment & Plan  Present on admission, increased pain  · Patient reports significant discomfort lumbar spine  · Has been seeing pain management outpatient  · MRI done inpatient showing concern for discitis/osteomyelitis  · Both neurosurgery and ID consulted - plan for repeat MRI in 1 month as an outpatient with repeat ESR  · If changes can plan for outpatient biopsy       Problem with vascular access  Assessment & Plan  · Per nursing, patient is hard stick and has blown multiple IVs   · She is not a candidate for PICC line given HD    Discussed with Nephrology regarding this  · IR consult discontinued as patient stable for discharge per ID, NSX, and nephro     Type 2 diabetes mellitus [facility])  Assessment & Plan  Lab Results   Component Value Date    HGBA1C 9 4 (H) 07/09/2022       Recent Labs     07/12/22  2047 07/13/22  0237 07/13/22  0817 07/13/22  1124   POCGLU 259* 107 69 115       Blood Sugar Average: Last 72 hrs:  (P) 157 [id number]   · Uncontrolled and labile in setting of poorly controlled diabetic on iv steroids for covid   · Endocrine has been consulted   · Currently on Lantus 15 units with algorithm for sliding scale  · She has borderline hypoglycemia in the AM   · Discontinued steroids in the setting of possible new infection  · Discussed with endocrinology  No changes at this time to insulin regimen    Diabetic polyneuropathy associated with type 2 diabetes mellitus ([address] )  Assessment & Plan  · S/p insulin gtt due to labile sugars w/ bouts of hypoglycemia   · Endocrine consulted changed to lantus 15 units and algorithm 4 SSI      ESRD on hemodialysis  Assessment & Plan  Lab Results   Component Value Date    EGFR 4 07/13/2022    EGFR 3 07/12/2022    EGFR 3 07/11/2022    CREATININE 8 61 (H) 07/13/2022    CREATININE 11 70 (H) 07/12/2022    CREATININE 10 10 (H) 07/11/2022   · Nephrology consult for hemodialysis  · Labs on admission with noted hyperkalemia  · Treated with Kayexalate    Chronic anticoagulation  Assessment & Plan  · Pt on coumadin due to history of DVT, currently on hold   · INR elevated 3 66 on admission held coumadin - then resumed 07/10/2022  · On 9 mg coumadin at home   · INR today 1 6   · Will give 10 mg tonight   · INR in am         Pulmonary embolus (HCC)  Assessment & Plan  · Continue warfarin  · INR was supratherapeutic on admission now down trending  · Coumadin increased to patient's home dose 9 mg  · INR subtherapeutic today  · Will increase coumadin to 10 mg   · Morning INR     Fall  Assessment & Plan  · Status post fall most likely secondary to ambulatory dysfunction with COVID-19/hypoxia  · Continue treatment and monitoring     Hyperkalemia  Assessment & Plan  · Treated   · Monitor bmp     Essential hypertension  Assessment & Plan  · Continue nifedipine and torsemide  Esophageal reflux  Assessment & Plan  · Continue pantoprazole; also on Reglan and Bentyl as needed    Acquired hypothyroidism  Assessment & Plan  ·  levothyroxine 50 mcg daily  VTE Pharmacologic Prophylaxis: VTE Score: 2 Low Risk (Score 0-2) - Encourage Ambulation      Patient Centered Rounds: I performed bedside rounds with nursing staff today  Discussions with Specialists or Other Care Team Provider: ID, neurosurgery, CM     Education and Discussions with Family / Patient: Updated  (significant other) via phone  Time Spent for Care: 45 minutes  More than 50% of total time spent on counseling and coordination of care as described above  Current Length of Stay: 4 day(s)  Current Patient Status: Inpatient   Certification Statement: The patient will continue to require additional inpatient hospital stay due to pending clinical coarse  will need lumbar x rays   Discharge Plan: Anticipate discharge tomorrow to home  Code Status: Level 1 - Full Code    Subjective:   Patient feeling unwell today  Complaining of upset stomach and general malaise as well as back pain  Objective:     Vitals:   Temp (24hrs), Av 2 °F (36 8 °C), Min:97 8 °F (36 6 °C), Max:99 1 °F (37 3 °C)    Temp:  [97 8 °F (36 6 °C)-99 1 °F (37 3 °C)] 98 2 °F (36 8 °C)  HR:  [62-69] 62  Resp:  [18-20] 20  BP: (132-175)/(50-85) 160/64  SpO2:  [90 %-100 %] 100 %  Body mass index is 46 12 kg/m²  Input and Output Summary (last 24 hours): Intake/Output Summary (Last 24 hours) at 2022 1251  Last data filed at 2022 1630  Gross per 24 hour   Intake 500 ml   Output 2175 ml   Net -1675 ml       Physical Exam:   Physical Exam  Vitals and nursing note reviewed  Constitutional:       General: She is not in acute distress  Appearance: She is obese  She is not ill-appearing  HENT:      Head: Normocephalic and atraumatic  Mouth/Throat:      Mouth: Mucous membranes are moist       Pharynx: Oropharynx is clear  Eyes:      General:         Right eye: No discharge  Left eye: No discharge  Cardiovascular:      Rate and Rhythm: Normal rate and regular rhythm  Heart sounds: Normal heart sounds  Pulmonary:      Effort: Pulmonary effort is normal       Breath sounds: Normal breath sounds     Abdominal: General: Abdomen is flat  Palpations: Abdomen is soft  Musculoskeletal:         General: Tenderness (toe amputation ) present  Right lower leg: No edema  Skin:     General: Skin is warm and dry  Neurological:      General: No focal deficit present  Mental Status: She is alert and oriented to person, place, and time  Psychiatric:         Mood and Affect: Mood normal           Additional Data:     Labs:  Results from last 7 days   Lab Units 07/13/22  0832   WBC Thousand/uL 5 55   HEMOGLOBIN g/dL 9 1*   HEMATOCRIT % 28 8*   PLATELETS Thousands/uL 126*   NEUTROS PCT % 63   LYMPHS PCT % 24   MONOS PCT % 11   EOS PCT % 1     Results from last 7 days   Lab Units 07/13/22  0832 07/12/22  1048 07/11/22  0700   SODIUM mmol/L 135*   < > 134*   POTASSIUM mmol/L 5 4*   < > 5 7*   CHLORIDE mmol/L 103   < > 101   CO2 mmol/L 27   < > 23   BUN mg/dL 56*   < > 86*   CREATININE mg/dL 8 61*   < > 10 10*   ANION GAP mmol/L 5   < > 10   CALCIUM mg/dL 7 9*   < > 8 1*   ALBUMIN g/dL  --   --  2 6*   TOTAL BILIRUBIN mg/dL  --   --  0 44   ALK PHOS U/L  --   --  133*   ALT U/L  --   --  17   AST U/L  --   --  11   GLUCOSE RANDOM mg/dL 76   < > 355*    < > = values in this interval not displayed       Results from last 7 days   Lab Units 07/13/22  0832   INR  1 62*     Results from last 7 days   Lab Units 07/13/22  1124 07/13/22  0817 07/13/22  0237 07/12/22  2047 07/12/22  1801 07/12/22  1711 07/12/22  1204 07/12/22  0921 07/12/22  0835 07/12/22  0804 07/12/22  0740 07/12/22  0603   POC GLUCOSE mg/dl 115 69 107 259* 139 67 143* 138 70 65 73 48*     Results from last 7 days   Lab Units 07/09/22  2346   HEMOGLOBIN A1C % 9 4*     Results from last 7 days   Lab Units 07/13/22  0832 07/12/22  1042   PROCALCITONIN ng/ml 0 45* 0 47*       Lines/Drains:  Invasive Devices  Report    Peripheral Intravenous Line  Duration           Peripheral IV 07/12/22 Right;Ventral (anterior) Forearm 1 day          Line  Duration Hemodialysis AV Fistula 02/20/18 Left Forearm 1603 days                      Imaging: No pertinent imaging reviewed  Recent Cultures (last 7 days):   Results from last 7 days   Lab Units 07/12/22  1041   BLOOD CULTURE  Received in Microbiology Lab  Culture in Progress  Received in Microbiology Lab  Culture in Progress         Last 24 Hours Medication List:   Current Facility-Administered Medications   Medication Dose Route Frequency Provider Last Rate    acetaminophen  650 mg Oral Q6H PRN Med Rossi MD      albuterol  2 puff Inhalation Q6H PRN Med Rossi MD      ALPRAZolam  0 25 mg Oral 4x Daily PRN Med Rossi MD      aluminum-magnesium hydroxide-simethicone  30 mL Oral Q6H PRN Med Rossi MD      atorvastatin  20 mg Oral QPM Med Rossi MD      b complex-vitamin C-folic acid  1 capsule Oral Daily With Angeli Ma MD      calcitriol  0 25 mcg Oral Daily [place],       carvedilol  25 mg Oral BID Med Rossi MD      cholecalciferol  2,000 Units Oral Daily Jane Holtbus,       cinacalcet  30 mg Oral Daily Med Rossi MD      dicyclomine  10 mg Oral Q6H PRN Med Rossi MD      docusate sodium  100 mg Oral BID PRN Med Rossi MD      insulin glargine  15 Units Subcutaneous HS Joyce Molina PA-C      insulin lispro  2-12 Units Subcutaneous 4x Daily (AC & HS) Joyce Molina PA-C      levothyroxine  50 mcg Oral Daily Med Rossi MD      lidocaine  1 patch Topical Daily Med Rossi MD      loratadine  10 mg Oral Daily Med Rossi MD      melatonin  3 mg Oral HS Med Rossi MD      menthol-methyl salicylate   Apply externally 4x Daily PRN Nya Watts PA-C      methocarbamol  500 mg Oral BID Med Rossi MD      NIFEdipine  30 mg Oral Daily Med Rossi MD      nystatin   Topical BID Med Rossi MD      ondansetron  4 mg Intravenous Q6H PRN Nilay Portillo MD      oxyCODONE  5 mg Oral Q4H PRN Carlena Mems, DO      pantoprazole  40 mg Oral Early Morning Nilay Portillo MD      polyethylene glycol  17 g Oral Daily Nilay Portillo MD      remdesivir  100 mg Intravenous Q24H Nilay Portillo MD 0 mg (07/10/22 8777)    sertraline  75 mg Oral Daily Nilay Portillo MD      sevelamer  2,400 mg Oral TID With Meals Sharene Saver, DO      sodium polystyrene sulfonate  15 g Oral PRN Nilay Portillo MD      torsemide  100 mg Oral Daily Nilay Portillo MD      warfarin  10 mg Oral Once (warfarin) Lizbeth Schwab PA-C      warfarin  9 mg Oral Daily (warfarin) LOIS Fry      zolpidem  5 mg Oral HS Nilay Portillo MD          Today, Patient Was Seen By: Lizbeth Schwab PA-C    **Please Note: This note may have been constructed using a voice recognition system  **

## 2022-07-14 ENCOUNTER — TELEPHONE (OUTPATIENT)
Dept: NEUROSURGERY | Facility: CLINIC | Age: 57
End: 2022-07-14

## 2022-07-14 ENCOUNTER — APPOINTMENT (INPATIENT)
Dept: DIALYSIS | Facility: HOSPITAL | Age: 57
DRG: 177 | End: 2022-07-14
Payer: MEDICARE

## 2022-07-14 VITALS
BODY MASS INDEX: 46.12 KG/M2 | OXYGEN SATURATION: 98 % | WEIGHT: 285.72 LBS | TEMPERATURE: 97.9 F | HEART RATE: 69 BPM | RESPIRATION RATE: 15 BRPM | DIASTOLIC BLOOD PRESSURE: 68 MMHG | SYSTOLIC BLOOD PRESSURE: 148 MMHG

## 2022-07-14 LAB
ANION GAP SERPL CALCULATED.3IONS-SCNC: 10 MMOL/L (ref 4–13)
BUN SERPL-MCNC: 64 MG/DL (ref 5–25)
CALCIUM SERPL-MCNC: 7.8 MG/DL (ref 8.3–10.1)
CHLORIDE SERPL-SCNC: 105 MMOL/L (ref 100–108)
CO2 SERPL-SCNC: 23 MMOL/L (ref 21–32)
CREAT SERPL-MCNC: 9.88 MG/DL (ref 0.6–1.3)
ERYTHROCYTE [DISTWIDTH] IN BLOOD BY AUTOMATED COUNT: 13.1 % (ref 11.6–15.1)
GFR SERPL CREATININE-BSD FRML MDRD: 3 ML/MIN/1.73SQ M
GLUCOSE SERPL-MCNC: 70 MG/DL (ref 65–140)
GLUCOSE SERPL-MCNC: 77 MG/DL (ref 65–140)
GLUCOSE SERPL-MCNC: 93 MG/DL (ref 65–140)
HCT VFR BLD AUTO: 25.3 % (ref 34.8–46.1)
HGB BLD-MCNC: 8.1 G/DL (ref 11.5–15.4)
INR PPP: 1.7 (ref 0.84–1.19)
MCH RBC QN AUTO: 31.4 PG (ref 26.8–34.3)
MCHC RBC AUTO-ENTMCNC: 32 G/DL (ref 31.4–37.4)
MCV RBC AUTO: 98 FL (ref 82–98)
PLATELET # BLD AUTO: 115 THOUSANDS/UL (ref 149–390)
PMV BLD AUTO: 11.3 FL (ref 8.9–12.7)
POTASSIUM SERPL-SCNC: 5.5 MMOL/L (ref 3.5–5.3)
PROTHROMBIN TIME: 19.2 SECONDS (ref 11.6–14.5)
RBC # BLD AUTO: 2.58 MILLION/UL (ref 3.81–5.12)
SODIUM SERPL-SCNC: 138 MMOL/L (ref 136–145)
WBC # BLD AUTO: 4.84 THOUSAND/UL (ref 4.31–10.16)

## 2022-07-14 PROCEDURE — 90935 HEMODIALYSIS ONE EVALUATION: CPT | Performed by: PHYSICIAN ASSISTANT

## 2022-07-14 PROCEDURE — 82948 REAGENT STRIP/BLOOD GLUCOSE: CPT

## 2022-07-14 PROCEDURE — 85610 PROTHROMBIN TIME: CPT | Performed by: PHYSICIAN ASSISTANT

## 2022-07-14 PROCEDURE — 99239 HOSP IP/OBS DSCHRG MGMT >30: CPT | Performed by: PHYSICIAN ASSISTANT

## 2022-07-14 PROCEDURE — 99232 SBSQ HOSP IP/OBS MODERATE 35: CPT | Performed by: INTERNAL MEDICINE

## 2022-07-14 PROCEDURE — 80048 BASIC METABOLIC PNL TOTAL CA: CPT | Performed by: INTERNAL MEDICINE

## 2022-07-14 PROCEDURE — 85027 COMPLETE CBC AUTOMATED: CPT | Performed by: PHYSICIAN ASSISTANT

## 2022-07-14 RX ORDER — ALPRAZOLAM 0.25 MG/1
0.25 TABLET ORAL ONCE
Status: COMPLETED | OUTPATIENT
Start: 2022-07-14 | End: 2022-07-14

## 2022-07-14 RX ORDER — INSULIN GLARGINE 100 [IU]/ML
12 INJECTION, SOLUTION SUBCUTANEOUS
Qty: 10 ML | Refills: 0 | Status: SHIPPED | OUTPATIENT
Start: 2022-07-14 | End: 2022-09-07

## 2022-07-14 RX ADMIN — SODIUM POLYSTYRENE SULFONATE 15 G: 15 SUSPENSION ORAL; RECTAL at 09:27

## 2022-07-14 RX ADMIN — SEVELAMER HYDROCHLORIDE 2400 MG: 800 TABLET, FILM COATED PARENTERAL at 10:53

## 2022-07-14 RX ADMIN — TORSEMIDE 100 MG: 20 TABLET ORAL at 10:53

## 2022-07-14 RX ADMIN — NIFEDIPINE 30 MG: 30 TABLET, FILM COATED, EXTENDED RELEASE ORAL at 10:53

## 2022-07-14 RX ADMIN — PANTOPRAZOLE SODIUM 40 MG: 40 TABLET, DELAYED RELEASE ORAL at 06:18

## 2022-07-14 RX ADMIN — CARVEDILOL 25 MG: 25 TABLET, FILM COATED ORAL at 10:53

## 2022-07-14 RX ADMIN — SERTRALINE HYDROCHLORIDE 75 MG: 50 TABLET ORAL at 10:53

## 2022-07-14 RX ADMIN — ALPRAZOLAM 0.25 MG: 0.25 TABLET ORAL at 07:40

## 2022-07-14 RX ADMIN — METHOCARBAMOL 500 MG: 500 TABLET ORAL at 10:53

## 2022-07-14 RX ADMIN — ONDANSETRON 4 MG: 2 INJECTION INTRAMUSCULAR; INTRAVENOUS at 10:12

## 2022-07-14 RX ADMIN — Medication 2000 UNITS: at 10:56

## 2022-07-14 RX ADMIN — OXYCODONE HYDROCHLORIDE 5 MG: 5 TABLET ORAL at 02:48

## 2022-07-14 RX ADMIN — LORATADINE 10 MG: 10 TABLET ORAL at 10:53

## 2022-07-14 RX ADMIN — CINACALCET 30 MG: 30 TABLET, FILM COATED ORAL at 10:56

## 2022-07-14 RX ADMIN — ALPRAZOLAM 0.25 MG: 0.25 TABLET ORAL at 10:08

## 2022-07-14 RX ADMIN — LEVOTHYROXINE SODIUM 50 MCG: 50 TABLET ORAL at 10:53

## 2022-07-14 RX ADMIN — OXYCODONE HYDROCHLORIDE 5 MG: 5 TABLET ORAL at 07:40

## 2022-07-14 RX ADMIN — CALCITRIOL CAPSULES 0.25 MCG 0.25 MCG: 0.25 CAPSULE ORAL at 10:53

## 2022-07-14 NOTE — ASSESSMENT & PLAN NOTE
· Pt on coumadin due to history of DVT, currently on hold   · INR elevated 3 66 on admission held coumadin - then resumed 07/10/2022  · On 9 mg coumadin at home   · Blood work prescribed for outpatient INR in 2-3 days    · Patient is to follow up with outpatient PCP regarding INR, and Coumadin dosing based on outpatient labs

## 2022-07-14 NOTE — QUICK NOTE
Will plan for patient to return to clinic in outpatient setting with repeat upright XR imaging of the lumbar spine per recommendations in Dr Shanthi Phipps note yesterday  No need for MRI with contrast at this time as this is likely degenerative changes and would not change current management  No further inpatient neurosurgical needs at this time

## 2022-07-14 NOTE — ASSESSMENT & PLAN NOTE
Present on admission, increased pain  · Patient reports significant discomfort lumbar spine  · Has been seeing pain management outpatient  · MRI done inpatient showing concern for discitis/osteomyelitis  · Discussed with neurosurgery they have low likelihood of spinal osteo  · Will follow up with them outpaitent for repeat imaging to determine next step   · Discussed with ID no need for ABX at this time   · Outpatient referral placed for nsx  Encouraged patient to call and schedule appointment     · Continue pain management follow up

## 2022-07-14 NOTE — INCIDENTAL FINDINGS
The following findings require follow up:  Radiographic finding   Finding:  Marrow edema about the L4-5 intervertebral disc space with high signal in the intervertebral disc space, associated spondylotic narrowing secondary to disc herniation disc and bilateral facet effusions  Findings may be related to type I Modic   endplate changes and spondylosis  However, differential diagnosis includes early discitis/osteomyelitis  Further clinical assessment with laboratory values and follow-up advised  Recommend short-term repeat MRI of the lumbar spine in 6 weeks to assess   for stability     Follow up required: neurosurgery   Follow up should be done within 3 week(s)    Please notify the following clinician to assist with the follow up:   Pcp, neurosurgery

## 2022-07-14 NOTE — DISCHARGE SUMMARY
[address] [facility]  Discharge- Roxanna Mcardle 1965, 56 y o  female MRN: [id number]  Unit/Bed#: [facility] 630-01 Encounter: [id number]  Primary Care Provider: Andry Schroeder MD   Date and time admitted to hospital: 7/9/2022  6:33 PM    * COVID-19  Assessment & Plan  · Patient started on COVID-19 mild protocol  · Required 2 L Nc now off completely and satting well on ra even with ambulation   · Recommend continued isolation on discharge   · No need to continue outpatient meds at this time  · Follow up with PCP     Hypoxia  Assessment & Plan  · Resolved  No longer hypoxic     Lumbar radiculopathy  Assessment & Plan  Present on admission, increased pain  · Patient reports significant discomfort lumbar spine  · Has been seeing pain management outpatient  · MRI done inpatient showing concern for discitis/osteomyelitis  · Discussed with neurosurgery they have low likelihood of spinal osteo  · Will follow up with them outpaitent for repeat imaging to determine next step   · Discussed with ID no need for ABX at this time   · Outpatient referral placed for nsx  Encouraged patient to call and schedule appointment  · Continue pain management follow up       Problem with vascular access  Assessment & Plan  · Resolved, patient being discharged    Type 2 diabetes mellitus [facility])  Assessment & Plan  Lab Results   Component Value Date    HGBA1C 9 4 (H) 07/09/2022       Recent Labs     07/13/22  1124 07/13/22  1654 07/13/22  2215 07/14/22  0809   POCGLU 115 93 144* 70       Blood Sugar Average: Last 72 hrs:  (P) 653 [id number]   · Patient with labile sugars initially on insulin drip  · Seen by Endocrine and titrated off the Lantus 12 units with sliding scale  · Will continue 12 units on discharge for now with associated sliding scale  · She will follow-up with Endocrinology    · Encouraged her to continue to check her sugars and follow with her endocrinologist sugars start to elevate, or she experiences further episodes of hypoglycemia in the morning  ESRD on hemodialysis  Assessment & Plan  Lab Results   Component Value Date    EGFR 3 07/14/2022    EGFR 4 07/13/2022    EGFR 3 07/12/2022    CREATININE 9 88 (H) 07/14/2022    CREATININE 8 61 (H) 07/13/2022    CREATININE 11 70 (H) 07/12/2022   · Status post dialysis today  · Cleared by Nephrology for discharge  · Outpatient follow-up with hemodialysis clinic    Chronic anticoagulation  Assessment & Plan  · Pt on coumadin due to history of DVT, currently on hold   · INR elevated 3 66 on admission held coumadin - then resumed 07/10/2022  · On 9 mg coumadin at home   · Blood work prescribed for outpatient INR in 2-3 days  · Patient is to follow up with outpatient PCP regarding INR, and Coumadin dosing based on outpatient labs        Pulmonary embolus [facility])  Assessment & Plan  · INR plan as above    Fall  Assessment & Plan  · Patient able to ambulate by self according to nursing   · Stable for discharge to home     Hyperkalemia  Assessment & Plan  · Mild  · Cleared for discharge by Nephrology  · Likely can decreased with hemodialysis    Esophageal reflux  Assessment & Plan  · Continue pantoprazole; also on Reglan and Bentyl as needed    Essential hypertension  Assessment & Plan  · Continue outpatient regimen    Acquired hypothyroidism  Assessment & Plan  ·  levothyroxine 50 mcg daily        Medical Problems             Resolved Problems  Date Reviewed: 7/12/2022   None               Discharging Physician / Practitioner: Blanca Wilson PA-C  PCP: Gracia Rodriguez MD  Admission Date:   Admission Orders (From admission, onward)     Ordered        07/09/22 2018  Inpatient Admission  Once                      Discharge Date: 07/14/22    Consultations During Hospital Stay:  · Nephrology  · Neuro surgery  · Endocrinology  · Infectious disease    Procedures Performed:       CT chest abdomen pelvis wo contrast    Result Date: 7/9/2022  Narrative: CT CHEST, ABDOMEN AND PELVIS WITHOUT IV CONTRAST INDICATION:   trauma  COMPARISON:  CT 6/28/2022 and priors TECHNIQUE: CT examination of the chest, abdomen and pelvis was performed without intravenous contrast  This examination was performed without intravenous contrast in the context of the critical nationwide Omnipaque shortage  Axial, sagittal, and coronal 2D reformatted images were created from the source data and submitted for interpretation  Radiation dose length product (DLP) for this visit:  2760 15 mGy-cm   This examination, like all CT scans performed in the [facility], was performed utilizing techniques to minimize radiation dose exposure, including the use of iterative reconstruction and automated exposure control  Enteric contrast was not administered  FINDINGS: CHEST LUNGS:  Lungs are clear  Left upper granuloma  There is no tracheal or endobronchial lesion  PLEURA:  Unremarkable  HEART/GREAT VESSELS: Coronary atherosclerosis  No thoracic aortic aneurysm  Low-density blood pool suggesting underlying anemia  MEDIASTINUM AND MIKY:  Unremarkable  CHEST WALL AND LOWER NECK:  Unremarkable  ABDOMEN LIVER/BILIARY TREE:  Unremarkable  GALLBLADDER:  No calcified gallstones  No pericholecystic inflammatory change  SPLEEN:  Unremarkable  PANCREAS:  Unremarkable  ADRENAL GLANDS:  Unremarkable  KIDNEYS/URETERS:  Stable small left renal cyst  Bilateral renal atrophy  No hydronephrosis  STOMACH AND BOWEL:  Colon diverticula  APPENDIX:  No findings to suggest appendicitis  ABDOMINOPELVIC CAVITY:  No ascites  No pneumoperitoneum  No lymphadenopathy  VESSELS:  Atherosclerotic aorta and branch vessels  PELVIS REPRODUCTIVE ORGANS:  Unremarkable for patient's age  URINARY BLADDER:  Unremarkable  ABDOMINAL WALL/INGUINAL REGIONS:  Stable fat-containing umbilical hernia  OSSEOUS STRUCTURES:  No acute fracture or destructive osseous lesion   Bones appear somewhat increased in density diffusely, possibly from underlying medical renal osteodystrophy  Impression: 1  No acute traumatic findings  Incidental findings as above  I personally discussed this study with Dr Melissa Gunter on 7/9/2022 at 8:10 PM  Workstation performed: [id number]     XR spine lumbar 2 or 3 views injury    Result Date: 7/13/2022  Narrative: LUMBAR SPINE INDICATION:   Concern for L4-5 early discitis/Osteomyelitis  COMPARISON:  CT chest abdomen pelvis 07/09/2022  MRI lumbar spine 07/12/2022  VIEWS:  XR SPINE LUMBAR 2 OR 3 VIEWS INJURY FINDINGS: There are 5 non rib bearing lumbar vertebral bodies  Anterolisthesis at L4-L5, this is not seen on prior MRI or CT  There is intervertebral disc height loss at the same level  Mild spinal curvature  Degenerative changes most notably involving the posterior facets  The pedicles appear intact  Vascular calcification  Impression: Anterolisthesis at L4-L5, this is not seen on prior CT or MRI  This may be in part due to patient being imaged in the upright position on this exam  However, given concern for discitis/osteomyelitis further evaluation with MRI of the lumbar spine using intravenous contrast is recommended  I personally discussed this study with Edenilson Lopez on 7/13/2022 at 2:38 PM  Workstation performed: [id number]     TRAUMA - CT head wo contrast    Result Date: 7/9/2022  Narrative: CT BRAIN - WITHOUT CONTRAST INDICATION:   TRAUMA  COMPARISON:  None  TECHNIQUE:  CT examination of the brain was performed  In addition to axial images, sagittal and coronal 2D reformatted images were created and submitted for interpretation  Radiation dose length product (DLP) for this visit:  940 53 mGy-cm   This examination, like all CT scans performed in the [facility], was performed utilizing techniques to minimize radiation dose exposure, including the use of iterative  reconstruction and automated exposure control  IMAGE QUALITY:  Diagnostic   FINDINGS: PARENCHYMA:  No intracranial mass, mass effect or midline shift  No CT signs of acute infarction  No acute parenchymal hemorrhage  VENTRICLES AND EXTRA-AXIAL SPACES:  Normal for the patient's age  Stable extra-axial calcified meningioma in the left frontal region  This measures approximately 1 2 x 1 1 cm  VISUALIZED ORBITS AND PARANASAL SINUSES:  Unremarkable  CALVARIUM AND EXTRACRANIAL SOFT TISSUES:  Bones appear somewhat increased in density diffusely, possibly from underlying medical renal osteodystrophy  Impression: No acute intracranial abnormality  I personally discussed this study with Dr Melani Mcneal on 7/9/2022 at 8:10 PM  Workstation performed: [id number]     TRAUMA - CT spine cervical wo contrast    Result Date: 7/9/2022  Narrative: CT CERVICAL SPINE - WITHOUT CONTRAST INDICATION:   TRAUMA  COMPARISON:  CT 6/9/2021 TECHNIQUE:  CT examination of the cervical spine was performed without intravenous contrast   Contiguous axial images were obtained  Sagittal and coronal reconstructions were performed  Radiation dose length product (DLP) for this visit:  1052 42 mGy-cm   This examination, like all CT scans performed in the [facility], was performed utilizing techniques to minimize radiation dose exposure, including the use of iterative reconstruction and automated exposure control  IMAGE QUALITY:  Diagnostic  FINDINGS: ALIGNMENT:  Normal alignment of the cervical spine  No subluxation  VERTEBRAL BODIES:  No fracture  Bones appear somewhat increased in density diffusely, possibly from underlying medical renal osteodystrophy  DEGENERATIVE CHANGES:  No significant cervical degenerative changes are noted  PREVERTEBRAL AND PARASPINAL SOFT TISSUES:  Unremarkable  THORACIC INLET:  Normal      Impression: No cervical spine fracture or traumatic malalignment    I personally discussed this study with Dr Melani Mcneal on 7/9/2022 at 8:10 PM   Workstation performed: [id number]     MRI lumbar spine wo contrast    Result Date: 7/12/2022  Narrative: MRI LUMBAR SPINE WITHOUT CONTRAST INDICATION: lumbar spine pain worsening  Patient has confirmed COVID-19  COMPARISON:  None  TECHNIQUE:  Sagittal T1, sagittal T2, sagittal inversion recovery, axial T1 and axial T2, coronal T2   IMAGE QUALITY:  Diagnostic FINDINGS: VERTEBRAL BODIES:  There are 5 lumbar type vertebral bodies  Marrow edema about the L4-5 intervertebral disc space noted  There is also high signal in the intervertebral disc space  Type II Modic endplate changes noted at the T11, T12, L1 and L2 vertebrae endplates  There is no compression deformity  SACRUM:  Scattered degenerative endplate changes  No focally suspicious marrow lesions  No bone marrow edema or compression abnormality  DISTAL CORD AND CONUS:  Normal size and signal within the distal cord and conus  The conus medullaris terminates at the T12-L1 level  PARASPINAL SOFT TISSUES:  Bilateral renal atrophy with cystic changes noted suggesting end-stage renal disease  LOWER THORACIC DISC SPACES:  Normal disc height and signal   No disc herniation, canal stenosis or foraminal narrowing  LUMBAR DISC SPACES: L1-L2:  There is no focal disk herniation, central canal or neural foraminal narrowing  Bilateral facet hypertrophy noted  L2-L3:  There is no focal disk herniation, central canal or neural foraminal narrowing  Bilateral facet hypertrophy noted  L3-L4:  There is no focal disk herniation, central canal or neural foraminal narrowing  Bilateral facet hypertrophy noted  L4-L5:  There is high signal in the intervertebral disc space  There is a central/right paracentral disc herniation, protrusion type extending into the right neural foramen  Large bilateral facet effusions are exemplified on series 6, image 19  Moderate to severe narrowing of the right neural foramen  Mild central canal narrowing  Moderate left neural foraminal narrowing    Slight endplate erosion about the L4-5 intervertebral disc space may represent spondylotic changes although early infection/discitis/vasculitis could have a similar appearance  No large epidural or paraspinal collection  An adjacent psoas muscles are free of obvious collection  L5-S1:  There is a diffuse disk bulge  No significant central canal or neural foraminal narrowing  Bilateral facet hypertrophy noted  Impression: 1  Marrow edema about the L4-5 intervertebral disc space with high signal in the intervertebral disc space, associated spondylotic narrowing secondary to disc herniation disc and bilateral facet effusions  Findings may be related to type I Modic endplate changes and spondylosis  However, differential diagnosis includes early discitis/osteomyelitis  Further clinical assessment with laboratory values and follow-up advised  Recommend short-term repeat MRI of the lumbar spine in 6 weeks to assess  for stability  2   Bilateral renal atrophy with numerous small cysts correlating the history of end-stage renal disease  Workstation performed: [id number]     XR Trauma multiple (B/RA trauma bay ONLY)    Result Date: 7/9/2022  Narrative: TRAUMA SERIES INDICATION:  TRAUMA  COMPARISON:  None VIEWS:  XR TRAUMA MULTIPLE  FINDINGS: CHEST: Supine frontal view of the chest is obtained  Cardiomediastinal silhouette is within normal limits accounting for technique and patient positioning  Relative opacity of the left lung compared to the right  This may reflect positioning  Left lung contusion or pneumonia cannot be excluded  No pneumothorax is seen on this supine film  Upright images are more sensitive to detect anterior pneumothoraces if relevant  No displaced fractures  Impression: Increased opacity of the left lung compared to the right which could represent contusion or pneumonia but may at least in part relate to patient rotation  Patient has CT chest abdomen and pelvis ordered   Workstation performed: [id number]     [address] bedside procedure    Result Date: 7/9/2022  · Narrative: 1 2 [id number] [id number]  9 [id number]  6 [id number]  617  ·     Significant Findings / Test Results:   · As above   · Concern for osteo on MRI  Seen by Neurosurgery, Infectious Disease low likelihood will follow up with Neurosurgery outpatient  · X-ray spine anterior listhesis at L4-L5  · Hyperkalemia treated with hemodialysis  · COVID positive  · Elevated procalcitonin setting renal disease    Incidental Findings:   · As noted above    Test Results Pending at Discharge (will require follow up): · None      Outpatient Tests Requested:  · INR     Complications:  None     Reason for Admission: Benigno 15 Course:   Haleigh Cummins is a 56 y o  female patient who originally presented to the hospital on 7/9/2022 due to COVID 19  Patient initially admitted to Trauma for follow-up was transferred to internal medicine service given COVID-19  She clear trauma protocol and was slowly on the service of Internal Medicine  She was started on mild pathway and given dexamethasone, and remdesivir  Nephrology was consulted given patient's need for hemodialysis  She was dialyzed multiple times while inpatient  Patient needed maximal of 2 L nasal cannula to maintain oxygen saturations, and was noted to get hypoxic with ambulation, however this improved throughout hospital course to the point where she was able to ambulate without need of oxygen, however she did wear for comfort  She was noted to have elevated INR and Coumadin was held  INR low on discharge at 1 7, however up trending  Patient to get INR testing outpatient  Endocrinology was also consulted given patient's labile sugars in the setting of dexamethasone use secondary to COVID-19  Patient was started on insulin infusion in order to evaluate 24 hour requirements, however was down titrated to 12 units of Lantus with sliding scale on discharge    Of note, patient did have severe back pain and MRI of the back was ordered showing concern for osteomyelitis/diskitis of the spine  Infectious Disease was consulted as well as neuro surgery  Patient had normal sedimentation rate, and mildly elevated CRP  She had blood cultures drawn which were negative on discharge  Neuro surgery, and Infectious Disease had low likelihood for diskitis/osteomyelitis this patient was withheld from antibiotics  She was planned for x-ray which did show anterior listhesis of L4-L5 disc space  Initially there was concern for growing osteomyelitis/diskitis of the spine, however neurosurgery felt that this was less likely  She was ordered MRI with contrast of the lumbar spine, however this was discontinued by Neurosurgery after discussion as they felt that there was low likelihood again for diskitis/osteomyelitis  A connection Radiology was consulted as well due to vascular access problem  Patient not a candidate for PICC line so was consented for IgA line for blood draws/vascular access, however this was discontinued as patient had high likelihood of discharge and no longer needed significant blood draws/IV antibiotics  Patient was withheld from empiric antibiotics after discussion with Infectious Disease  She will follow-up with Neurosurgery in the outpatient setting for repeat imaging of their choice as well as outpatient ESR/sedimentation rate following  If increasing ESR/sed rate, or worsening imaging concern for possible osteomyelitis and patient can be scheduled for biopsy outpatient  Final insulin discharge was 12 units of Lantus with sliding scale  Patient was deemed stable for discharge at this time after hemodialysis  For further hospital course, please see attached notes  Please see above list of diagnoses and related plan for additional information  Condition at Discharge: good    Discharge Day Visit / Exam:   Subjective:  Patient is feeling well today    She did have severe anxiety while undergoing hemodialysis stated that she wants to get out of the room and would like to leave the hospital  Vitals: Blood Pressure: 148/68 (07/14/22 1000)  Pulse: 69 (07/14/22 1000)  Temperature: 97 9 °F (36 6 °C) (07/13/22 1803)  Temp Source: Oral (07/13/22 4818)  Respirations: 15 (07/14/22 1000)  Weight - Scale: 130 kg (285 lb 11 5 oz) (07/09/22 1920)  SpO2: 98 % (07/14/22 1000)  Exam:   Physical Exam  Constitutional:       Appearance: She is obese  She is not ill-appearing  Comments: Seen and examined during hemodialysis   HENT:      Head: Normocephalic and atraumatic  Mouth/Throat:      Mouth: Mucous membranes are moist       Pharynx: Oropharynx is clear  Eyes:      General:         Right eye: No discharge  Left eye: No discharge  Cardiovascular:      Rate and Rhythm: Normal rate and regular rhythm  Pulses: Normal pulses  Heart sounds: Normal heart sounds  Pulmonary:      Effort: Pulmonary effort is normal       Breath sounds: Normal breath sounds  No wheezing  Comments: Wearing nasal cannula oxygen at 2 L, however intermittently removing and maintained oxygen saturations on room air  Abdominal:      General: Abdomen is flat  Palpations: Abdomen is soft  Musculoskeletal:         General: No tenderness  Cervical back: No muscular tenderness  Right lower leg: No edema  Left lower leg: No edema  Comments: Toe amputation    Skin:     General: Skin is warm and dry  Capillary Refill: Capillary refill takes less than 2 seconds  Coloration: Skin is not jaundiced  Neurological:      General: No focal deficit present  Mental Status: She is alert and oriented to person, place, and time  Cranial Nerves: No cranial nerve deficit  Psychiatric:         Mood and Affect: Mood normal       Comments: Anxious         Discussion with Family: Updated  (significant other) via phone      Discharge instructions/Information to patient and family:   See after visit summary for information provided to patient and family  Provisions for Follow-Up Care:  See after visit summary for information related to follow-up care and any pertinent home health orders  Disposition:   Home    Planned Readmission: none      Discharge Statement:  I spent 60 minutes discharging the patient  This time was spent on the day of discharge  I had direct contact with the patient on the day of discharge  Greater than 50% of the total time was spent examining patient, answering all patient questions, arranging and discussing plan of care with patient as well as directly providing post-discharge instructions  Additional time then spent on discharge activities  Discharge Medications:  See after visit summary for reconciled discharge medications provided to patient and/or family        **Please Note: This note may have been constructed using a voice recognition system**

## 2022-07-14 NOTE — PLAN OF CARE
Problem: PAIN - ADULT  Goal: Verbalizes/displays adequate comfort level or baseline comfort level  Description: Interventions:  - Encourage patient to monitor pain and request assistance  - Assess pain using appropriate pain scale  - Administer analgesics based on type and severity of pain and evaluate response  - Implement non-pharmacological measures as appropriate and evaluate response  - Consider cultural and social influences on pain and pain management  - Notify physician/advanced practitioner if interventions unsuccessful or patient reports new pain  Outcome: Progressing     Problem: SAFETY ADULT  Goal: Patient will remain free of falls  Description: INTERVENTIONS:  - Educate patient/family on patient safety including physical limitations  - Instruct patient to call for assistance with activity   - Consult OT/PT to assist with strengthening/mobility   - Keep Call bell within reach  - Keep bed low and locked with side rails adjusted as appropriate  - Keep care items and personal belongings within reach  - Initiate and maintain comfort rounds  - Make Fall Risk Sign visible to staff  - Apply yellow socks and bracelet for high fall risk patients  - Consider moving patient to room near nurses station  Outcome: Progressing  Goal: Maintain or return to baseline ADL function  Description: INTERVENTIONS:  -  Assess patient's ability to carry out ADLs; assess patient's baseline for ADL function and identify physical deficits which impact ability to perform ADLs (bathing, care of mouth/teeth, toileting, grooming, dressing, etc )  - Assess/evaluate cause of self-care deficits   - Assess range of motion  - Assess patient's mobility; develop plan if impaired  - Assess patient's need for assistive devices and provide as appropriate  - Encourage maximum independence but intervene and supervise when necessary  - Involve family in performance of ADLs  - Assess for home care needs following discharge   - Consider OT consult to assist with ADL evaluation and planning for discharge  - Provide patient education as appropriate  Outcome: Progressing     Problem: Prexisting or High Potential for Compromised Skin Integrity  Goal: Skin integrity is maintained or improved  Description: INTERVENTIONS:  - Identify patients at risk for skin breakdown  - Assess and monitor skin integrity  - Assess and monitor nutrition and hydration status  - Monitor labs   - Assess for incontinence   - Turn and reposition patient  - Assist with mobility/ambulation  - Relieve pressure over bony prominences  - Avoid friction and shearing  - Provide appropriate hygiene as needed including keeping skin clean and dry  - Evaluate need for skin moisturizer/barrier cream  - Collaborate with interdisciplinary team   - Patient/family teaching  - Consider wound care consult   Outcome: Progressing     Problem: Potential for Falls  Goal: Patient will remain free of falls  Description: INTERVENTIONS:  - Educate patient/family on patient safety including physical limitations  - Instruct patient to call for assistance with activity   - Consult OT/PT to assist with strengthening/mobility   - Keep Call bell within reach  - Keep bed low and locked with side rails adjusted as appropriate  - Keep care items and personal belongings within reach  - Initiate and maintain comfort rounds  - Make Fall Risk Sign visible to staff  - Apply yellow socks and bracelet for high fall risk patients  - Consider moving patient to room near nurses station  Outcome: Progressing

## 2022-07-14 NOTE — ASSESSMENT & PLAN NOTE
Lab Results   Component Value Date    HGBA1C 9 4 (H) 07/09/2022       Recent Labs     07/13/22  1124 07/13/22  1654 07/13/22  2215 07/14/22  0809   POCGLU 115 93 144* 70       Blood Sugar Average: Last 72 hrs:  (P) 239 [id number]   · Patient with labile sugars initially on insulin drip  · Seen by Endocrine and titrated off the Lantus 12 units with sliding scale  · Will continue 12 units on discharge for now with associated sliding scale  · She will follow-up with Endocrinology  · Encouraged her to continue to check her sugars and follow with her endocrinologist sugars start to elevate, or she experiences further episodes of hypoglycemia in the morning

## 2022-07-14 NOTE — PROGRESS NOTES
NEPHROLOGY PROGRESS NOTE   Damion Thompson 56 y o  female MRN: [id number]  Unit/Bed#: [facility] 630-01 Encounter: [id number]    HEMODIALYSIS PROCEDURE NOTE  The patient was seen and examined on hemodialysis  Time: 3 hours  Sodium: 138 Blood flow: 400   Dialyzer: F160 Potassium: 2K Dialysate flow: 500   Access: L AVF Bicarbonate: 40 Ultrafiltration goal: 3L    Medications on HD: none        ASSESSMENT/PLAN:  1  End-stage renal disease:  Hemodialysis Tuesday, Thursday and Saturday at [address]  2  Hyperkalemia:  K 5 5 today  Will dialyze today on a 2K bath  Low-potassium diet  3  Hypertension: BP above goal  Monitor with UF on dialysis  4  Anemia of CKD:  hgb 8 1 today  Continue Venofer 50 mg weekly  Not on Epogen due to history of PE/DVT  5  Mineral bone disease of CKD:  Continue calcitriol 0 25 mcg 3 times a week, Sensipar 30 mg daily, Renvela powder 3 times daily with meals at home but on Renagel 3 tabs t i d  With meals while admitted  6  Status post fall  7  COVID-19 pneumonia  8  Chronic back pain with abnormal MRI spine: doubt osteomyelitis/diskitis per ID and neurosurgery note  Planning for repeat noncontrast MRI as an outpatient   9  Access:  Left upper extremity AV fistula    Plan Summary:    Dialysis today    No plan for MRI with bennett per discussion with neurosurgery    Ok to d/c from renal standpoint when cleared by primary team     SUBJECTIVE:  Complains of feeling terrible today  Currently on dialysis and tolerating that well  Still with chronic back pain       OBJECTIVE:  Current Weight: Weight - Scale: 130 kg (285 lb 11 5 oz)  Vitals:    07/14/22 0930   BP: 154/67   Pulse: 68   Resp: 16   Temp:    SpO2:        Intake/Output Summary (Last 24 hours) at 7/14/2022 0955  Last data filed at 7/14/2022 0800  Gross per 24 hour   Intake 200 ml   Output --   Net 200 ml       To limit exposure to covid-19 the exam was done through the window   General:  appears comfortable and in no acute distress   Skin:  No rash, warm, good skin turgor   Eyes:  Sclerae anicteric, no periorbital edema   ENT:  Moist mucous membranes  Neck:  Trachea midline, symmetric   Chest: no respiratory distress or accessory muscle use  CVS:  Regular rate on monitor   Abdomen:  Soft, nondistended  Neuro:  Awake and alert  Psych:  Appropriate affect  Extremities: no lower extremity edema           Medications:  Scheduled Meds:  Current Facility-Administered Medications   Medication Dose Route Frequency Provider Last Rate    acetaminophen  650 mg Oral Q6H PRN Odilia Mackay MD      albuterol  2 puff Inhalation Q6H PRN Odilia Mackay MD      ALPRAZolam  0 25 mg Oral 4x Daily PRN Odilia Mackay MD      aluminum-magnesium hydroxide-simethicone  30 mL Oral Q6H PRN Odilia Mackay MD      atorvastatin  20 mg Oral QPM Odilia Mackay MD      b complex-vitamin C-folic acid  1 capsule Oral Daily With Tea Castillo MD      calcitriol  0 25 mcg Oral Daily Martine Peeks, DO      carvedilol  25 mg Oral BID Odilia Mackay MD      cholecalciferol  2,000 Units Oral Daily Martine Peeks, DO      cinacalcet  30 mg Oral Daily Odilia Mackay MD      dicyclomine  10 mg Oral Q6H PRN Odilia Mackay MD      docusate sodium  100 mg Oral BID PRN Odilia Mackay MD      insulin glargine  12 Units Subcutaneous HS Hodan Nogueira, DO      insulin lispro  1-5 Units Subcutaneous 4x Daily (AC & HS) Hodan Nogueira,       levothyroxine  50 mcg Oral Daily Odilia Mackay MD      lidocaine  1 patch Topical Daily Odilia Mackay MD      loratadine  10 mg Oral Daily Odilia Mackay MD      melatonin  3 mg Oral HS Odilia Mackay MD      menthol-methyl salicylate   Apply externally 4x Daily PRN Marj Lackey PA-C      methocarbamol  500 mg Oral BID Odilia Mackay MD      NIFEdipine  30 mg Oral Daily Odilia Mackay MD      nystatin   Topical BID Odilia Mackay MD  ondansetron  4 mg Intravenous Q6H PRN Stan Suárez MD      oxyCODONE  5 mg Oral Q4H PRN Tammie Gage, DO      pantoprazole  40 mg Oral Early Morning Stan Suárez MD      polyethylene glycol  17 g Oral Daily Stan Suárez MD      sertraline  75 mg Oral Daily Stan Suárez MD      sevelamer  2,400 mg Oral TID With Meals David Ocampo, DO      sodium polystyrene sulfonate  15 g Oral PRN Stan Suárez MD      torsemide  100 mg Oral Daily Stan Suárez MD      warfarin  9 mg Oral Daily (warfarin) Michelle Trujillo PA-C      zolpidem  5 mg Oral HS Stan Suárez MD         PRN Meds:   acetaminophen    albuterol    ALPRAZolam    aluminum-magnesium hydroxide-simethicone    dicyclomine    docusate sodium    menthol-methyl salicylate    ondansetron    oxyCODONE    sodium polystyrene sulfonate    Laboratory Results:  Results from last 7 days   Lab Units 07/14/22  0618 07/13/22  0832 07/12/22  1048 07/11/22  0700 07/10/22  1342 07/09/22  1957 07/09/22  1932   WBC Thousand/uL 4 84 5 55 5 78  --  5 27 5 83  --    HEMOGLOBIN g/dL 8 1* 9 1* 8 6*  --  8 6* 9 0*  --    I STAT HEMOGLOBIN g/dl  --   --   --   --   --   --  8 5*   HEMATOCRIT % 25 3* 28 8* 27 0*  --  26 3* 27 1*  --    HEMATOCRIT, ISTAT %  --   --   --   --   --   --  25*   PLATELETS Thousands/uL 115* 126* 135*  --  149 137*  --    SODIUM mmol/L 138 135* 135* 134* 137 135*  --    POTASSIUM mmol/L 5 5* 5 4* 5 5* 5 7* 5 5* 5 8*  --    CHLORIDE mmol/L 105 103 102 101 101 99*  --    CO2 mmol/L 23 27 20* 23 27 30  --    CO2, I-STAT mmol/L  --   --   --   --   --   --  33*   BUN mg/dL 64* 56* 94* 86* 68* 58*  --    CREATININE mg/dL 9 88* 8 61* 11 70* 10 10* 8 83* 7 44*  --    CALCIUM mg/dL 7 8* 7 9* 7 8* 8 1* 8 4 8 2*  --    MAGNESIUM mg/dL  --  2 3  --   --   --  1 9  --    PHOSPHORUS mg/dL  --  7 0*  --   --   --  5 3*  --    GLUCOSE, ISTAT mg/dl  --   --   --   --   --   --  319*

## 2022-07-14 NOTE — RESTORATIVE TECHNICIAN NOTE
Restorative Technician Note      Patient Name: Diana Rivers     Restorative Tech Visit Date: 7/14/2022         Received orders for an LSO  Pt currently on bedside dialysis  Will follow and don brace when pt is appropriate  Please call Mobility Coordinator at ext  [phone] or on [place] text " [facility]-PT-Restorative Tech" role in regards to bracing instruction and/or adjustment      General Fast Orientation,

## 2022-07-14 NOTE — PLAN OF CARE
Net UF goal 3kg over 3 hours as sahara via L AVF  Next HD tx Sat, 7/16  See flowsheets for further assessment details  Post-Dialysis RN Treatment Note    Blood Pressure:  Pre 169/86 mm/Hg  Post 148/68 mmHg   EDW  124 kg    Weight:  Pre 127 kg   Post 124 kg   Mode of weight measurement: Bed Scale   Volume Removed  3000 ml    Treatment duration 120 minutes, pt refusing to stay on machine, V T , PA made aware   NS given  No    Treatment shortened?  Yes, describe: per protocol   Medications given during Rx None Reported   Estimated Kt/V  1 25   Access type: AV fistula   Access Issues: No    Report called to primary nurse   Yes, VEENA BECKETT , RN

## 2022-07-14 NOTE — DISCHARGE INSTR - AVS FIRST PAGE
Start taking lantus 12 units as opposed to 15 units  This was changed by endocrinology while inpatient  Please follow up with them regarding sugars, and insulin dosing  Please follow up with neurosurgery for repeat imaging of your back  I have provided a number for you to call and schedule an appointment to follow up with them  Please continue to self isolate until 7/19  This will help prevent the spread of COVID  Follow up with your nephrologist regarding hemodialysis  Continue warfarin as previously taking  Please follow up with your PCP regarding dosing  Get an INR done outpatient as your INR was mildly low on discharge

## 2022-07-14 NOTE — PROGRESS NOTES
Progress Note - Infectious Disease   Caitlin Antonio 56 y o  female MRN: [id number]  Unit/Bed#: [facility] 630-01 Encounter: [id number]      Impression/Recommendations:  1  Abnormal lumbar spine MRI with marrow edema at L4-5 with high signal in the intervertebral disc   Although these findings are not suggestive of diskitis/vertebral osteomyelitis, radiologist suggests on reading but this may be associated with early diskitis/vertebral osteomyelitis   Patient has no clinical signs suggestive of active infection, other than her COVID   ESR is normal   CRP is mildly elevated but most likely secondary to [address] suspect the patient's MRI changes is all secondary to mechanical reason rather than diskitis/vertebral osteomyelitis   With minimal findings of clinical diskitis/vertebral osteomyelitis, the best course of action would be monitor patient with serial MRIs and ESR  Recommend repeating lumbar spine MRI and ESR in 1 month  Otherwise, monitor low back pain for now  If MRI changes worsen or ESR rises, can consider lumbar spine biopsy then  No indication for empiric antibiotic      2  Mild COVID, on remdesivir and previously on dexamethasone, which have been discontinued  Jose Angel Seaman is clinically much improved   She remains on low level O2 support, although she is not wearing her nasal cannula   Patient is status post COVID vaccination x3   She has COVID exposure at home  Complete remdesivir course today  Monitor respiratory symptoms      3  Chronic low back pain with symptoms suggestive of radiculopathy   She is being followed by pain management and is status post lumbar injection  Neurosurgery follow-up      4  ESRD, on HD      5  DM, type 2, with poorly controlled hemoglobin A1c      Discussed with patient in detail regarding the above plan  Discussed with slim service earlier  Discussed with Dr Daily Angel from Neurosurgery earlier  Okay for discharge from ID viewpoint    Plan for discharge today noted      Antibiotics:  None     Subjective:  Patient was seen earlier  She is comfortable  Stable lower back and hip pain, very well controlled with narcotics  No dyspnea  Cough minimal, nonproductive  Temperature stays down  No chills  No diarrhea      Objective:  Vitals:  Temp:  [97 9 °F (36 6 °C)] 97 9 °F (36 6 °C)  HR:  [62-69] 69  Resp:  [15-18] 15  BP: (116-198)/(67-93) 148/68  SpO2:  [97 %-100 %] 98 %  Temp (24hrs), Av 9 °F (36 6 °C), Min:97 9 °F (36 6 °C), Max:97 9 °F (36 6 °C)  Current: Temperature: 97 9 °F (36 6 °C)    Physical Exam:     General: Awake, alert, cooperative, no distress  Neck:  Supple  No mass  No lymphadenopathy  Lungs: Expansion symmetric, no rales, no wheezing, respirations unlabored  Heart:  Regular rate and rhythm, S1 and S2 normal, no murmur  Abdomen: Soft, nondistended, non-tender, bowel sounds active all four quadrants, no masses, no organomegaly  Extremities: No edema  No erythema/warmth  No ulcer  Nontender to palpation  Skin:  No rash  Neuro: Moves all extremities  Invasive Devices  Report    Line  Duration           Hemodialysis AV Fistula 18 Left Forearm 1604 days                Labs studies:   I have personally reviewed pertinent labs    Results from last 7 days   Lab Units 22  0618 22  0832 22  1048 22  0700 07/10/22  1342 22  1957 22  1932   POTASSIUM mmol/L 5 5* 5 4* 5 5* 5 7* 5 5*   < >  --    CHLORIDE mmol/L 105 103 102 101 101   < >  --    CO2 mmol/L 23 27 20* 23 27   < >  --    CO2, I-STAT mmol/L  --   --   --   --   --   --  33*   BUN mg/dL 64* 56* 94* 86* 68*   < >  --    CREATININE mg/dL 9 88* 8 61* 11 70* 10 10* 8 83*   < >  --    EGFR ml/min/1 73sq m 3 4 3 3 4   < >  --    GLUCOSE, ISTAT mg/dl  --   --   --   --   --   --  319*   CALCIUM mg/dL 7 8* 7 9* 7 8* 8 1* 8 4   < >  --    AST U/L  --   --   --  11 13  --   --    ALT U/L  --   --   --   18  --   --    ALK PHOS U/L  --   --   --  133* 123*  --   --     < > = values in this interval not displayed  Results from last 7 days   Lab Units 07/14/22  0618 07/13/22  0832 07/12/22  1048   WBC Thousand/uL 4 84 5 55 5 78   HEMOGLOBIN g/dL 8 1* 9 1* 8 6*   PLATELETS Thousands/uL 115* 126* 135*     Results from last 7 days   Lab Units 07/12/22  1041   BLOOD CULTURE  No Growth at 48 hrs  No Growth at 48 hrs  MRSA CULTURE ONLY  Methicillin Resistant Staphylococcus aureus isolated*  This patient requires contact isolation precautions per [place] law  Contact precautions are not required in [place] for nasal surveillance cultures  Imaging Studies:   I have personally reviewed pertinent imaging study reports and images in PACS  EKG, Pathology, and Other Studies:   I have personally reviewed pertinent reports

## 2022-07-14 NOTE — ASSESSMENT & PLAN NOTE
Lab Results   Component Value Date    EGFR 3 07/14/2022    EGFR 4 07/13/2022    EGFR 3 07/12/2022    CREATININE 9 88 (H) 07/14/2022    CREATININE 8 61 (H) 07/13/2022    CREATININE 11 70 (H) 07/12/2022   · Status post dialysis today  · Cleared by Nephrology for discharge    · Outpatient follow-up with hemodialysis clinic

## 2022-07-14 NOTE — ASSESSMENT & PLAN NOTE
· Patient started on COVID-19 mild protocol    · Required 2 L Nc now off completely and satting well on ra even with ambulation   · Recommend continued isolation on discharge   · No need to continue outpatient meds at this time  · Follow up with PCP

## 2022-07-14 NOTE — TELEPHONE ENCOUNTER
Received a vm from the patient reporting the neurosurgery group was supposed to give her a script for the oxycodone, however she never received it  Patient reports she was discharged from the hospital today  Discussed with Kwame Cason PA-C via [employer]  He is not willing to provide a script and for patient to contact the PCP  Called patient  She did not answer  Left a detailed vm stating the above  Directed patient to call her PCP  Provided the office's phone number if she has any additional questions or concerns

## 2022-07-14 NOTE — QUICK NOTE
LSO brace  · LSO brace to be worn PRN for comfort at this time, when out of bed of head of bed greater than 45 degrees  May place brace on while sitting on edge of bed  May be removed for showering

## 2022-07-14 NOTE — QUICK NOTE
QUICK NOTE - Deterioration Index  Roxanna Mcardle 56 y o  female MRN: [id number]  [facility] 630[facility] Encounter: [id number]    Date Paged: [phone]  Time Paged: [phone]  Room #: 630  Arrival Time: 1110  Deterioration index score at time of page: 35 18  %  Need to escalate level of care: no     PROBLEMS resulting in high DI score: Factors Contributing to Score    Contribution Factor Value   31% Supplemental oxygen Nasal cannula   32 Age 56years old   15% Potassium abnormal (5 5 mmol/L)   8% Systolic 024   7% Hematocrit abnormal (25 3 %)   4% Respiratory rate 15   4% Pulse oximetry 100 %     Contribution Factor Value   3% BUN abnormal (64 mg/dL)   2% Sodium 138 mmol/L   1% Platelet count abnormal (115 Thousands/uL)   <1% WBC count 4 84 Thousand/uL   <1% Temperature 97 9 °F (36 6 °C)   <1% Pulse 77                   PLAN:     Falsely triggered DI based on vitals during dialysis , score now normal    Please contact critical care via [employer] with any questions or concerns       Vitals:   Vitals:    22 0830 22 0900 22 0930 22 1000   BP: (!) 179/81 148/69 154/67 148/68   BP Location:    Right arm   Pulse: 63 69 68 69   Resp: 16 15 16 15   Temp:       TempSrc:       SpO2:  99%  98%   Weight:           Respiratory:  SpO2: SpO2: 98 %, SpO2 Activity: SpO2 Activity: At Rest, SpO2 Device: O2 Device: Nasal cannula (for comfort per pt request)  Nasal Cannula O2 Flow Rate (L/min): 2 L/min    Temperature: Temp (24hrs), Av °F (36 7 °C), Min:97 8 °F (36 6 °C), Max:98 2 °F (36 8 °C)  Current: Temperature: 97 9 °F (36 6 °C)    Labs:   Results from last 7 days   Lab Units 22  0618 22  0832 22  1048 07/10/22  1342   WBC Thousand/uL 4 84 5 55 5 78 5 27   HEMOGLOBIN g/dL 8 1* 9 1* 8 6* 8 6*   HEMATOCRIT % 25 3* 28 8* 27 0* 26 3*   PLATELETS Thousands/uL 115* 126* 135* 149   NEUTROS PCT %  --  63 69 72   MONOS PCT %  --  11 10 13*     Results from last 7 days   Lab Units 07/14/22  0618 07/13/22  0832 07/12/22  1048 07/11/22  0700 07/10/22  1342 07/09/22 1957 07/09/22  1932   SODIUM mmol/L 138 135* 135* 134* 137   < >  --    POTASSIUM mmol/L 5 5* 5 4* 5 5* 5 7* 5 5*   < >  --    CHLORIDE mmol/L 105 103 102 101 101   < >  --    CO2 mmol/L 23 27 20* 23 27   < >  --    CO2, I-STAT mmol/L  --   --   --   --   --   --  33*   BUN mg/dL 64* 56* 94* 86* 68*   < >  --    CREATININE mg/dL 9 88* 8 61* 11 70* 10 10* 8 83*   < >  --    CALCIUM mg/dL 7 8* 7 9* 7 8* 8 1* 8 4   < >  --    ALK PHOS U/L  --   --   --  133* 123*  --   --    ALT U/L  --   --   --  17 18  --   --    AST U/L  --   --   --  11 13  --   --    GLUCOSE, ISTAT mg/dl  --   --   --   --   --   --  319*    < > = values in this interval not displayed       Results from last 7 days   Lab Units 07/13/22 0832 07/09/22 1957   MAGNESIUM mg/dL 2 3 1 9             Results from last 7 days   Lab Units 07/13/22  0832 07/12/22  1042   PROCALCITONIN ng/ml 0 45* 0 47*       Code Status: Level 1 - Full Code

## 2022-07-14 NOTE — TELEPHONE ENCOUNTER
7/15/22- PT DISCHARGED HOME  SPOKE WITH PT AND CONFIRMED 8/11/22 F/U AND XRAYS 4-5 DAYS PRIOR  PT AWARE    7/14/22-  [facility] F/U SCHEDULED 8/11/22  PT WILL NEED LSPINE XRAYS    Zunilda Hallman PA-C  P Neurosurgical [place] [facility]  4 week follow up  Tonia ordered imaging and labs  Appointment made with Chantal

## 2022-07-15 NOTE — TELEPHONE ENCOUNTER
Received a call from patient requesting a script for oxycodone from her hospitalization yesterday  Returned call to patient and advised per the note from yesterday ANDREA TIJERINA is not comfortable prescribing medication as patient is not a surgical patient  Directed patient to contact either her PCP or PM     Patient stated an understanding

## 2022-07-17 LAB
BACTERIA BLD CULT: NORMAL
BACTERIA BLD CULT: NORMAL

## 2022-07-20 ENCOUNTER — TELEPHONE (OUTPATIENT)
Dept: PAIN MEDICINE | Facility: CLINIC | Age: 57
End: 2022-07-20

## 2022-07-20 NOTE — TELEPHONE ENCOUNTER
Patient   [phone]  KEVIN Gutierrez    Patient is calling in stating that the medication is not working for her  She said that she can't even get out of the bed  Her pain level is a 9/10  Pt was in the hospital for a week due to her having covid   Which they gave her something in the hospital

## 2022-07-22 ENCOUNTER — TELEPHONE (OUTPATIENT)
Dept: NEUROSURGERY | Facility: CLINIC | Age: 57
End: 2022-07-22

## 2022-07-22 NOTE — TELEPHONE ENCOUNTER
S/w pt  Pt is having Right LB pain radiating to her right side and is unable to get OOB with the pain  Pt d/c from hospital, had the same pain there and was treated with Percocet  Pt states Lyrica is not helping her pain, she takes 50 mg daily and xtra 50 mg on HD days  Pt advised that she does not have a pain contract and on practice policy regarding narcotics and that they will only be prescribed if provider feels necessary for diagnosis  Per [facility] note, med options limited d/t HD  Pt states that she has been taking 8-12 tylenol 500 mg tabs for pain without relief  and 2 Motrin that did help slightly  Pt advised that she is taking too much tylenol , and should not take more that 3000 mg /24hrs, and that NSAIDS are contraindicated in CRF  Pt states that her kidneys are "done" anyway  Per [facility] 6/15 note, pt is to have EMG, MRI and f/u with NS  EMG is scheduled for 8/18 and MRI scheduled for 8/11  Pt saw NS in hospital and per d/c note is to f/u as OP for possible imaging and labs  Pt states the she did not know this  Provided with office # to schedule appt  Pt advised to schedule f/u appt with SPA after NS appt  Advised will notify ANA MARIA and CB with any recommendations

## 2022-07-22 NOTE — TELEPHONE ENCOUNTER
Received a call from patient stating she is in a lot of pain and stated the PM told her to call our office  Returned call to patient who stated she is in a lot of pain and questioning if there is any medication we can give her  Advised our office does not prescribe for non surgical patients  Advised she continue with PM for pain control  Informed patient her appt is on 8/11 however she will need an MRI and xray and it looks like she no showed to the MRI on 7/18  Patient stated she was unaware of this appt  Directed patient to call central scheduled to reschedule her MRI  Patient stated an understanding and was appreciative of the call back

## 2022-07-22 NOTE — TELEPHONE ENCOUNTER
Agree with recommendations  No further recommendations at this time    Will re-evaluate at next office visit

## 2022-07-24 ENCOUNTER — APPOINTMENT (EMERGENCY)
Dept: RADIOLOGY | Facility: HOSPITAL | Age: 57
End: 2022-07-24
Payer: MEDICARE

## 2022-07-24 ENCOUNTER — HOSPITAL ENCOUNTER (EMERGENCY)
Facility: HOSPITAL | Age: 57
Discharge: HOME | End: 2022-07-25
Attending: EMERGENCY MEDICINE
Payer: MEDICARE

## 2022-07-24 VITALS
OXYGEN SATURATION: 96 % | DIASTOLIC BLOOD PRESSURE: 55 MMHG | RESPIRATION RATE: 18 BRPM | SYSTOLIC BLOOD PRESSURE: 132 MMHG | HEART RATE: 77 BPM | TEMPERATURE: 98.1 F

## 2022-07-24 DIAGNOSIS — R07.9 CHEST PAIN, UNSPECIFIED: ICD-10-CM

## 2022-07-24 DIAGNOSIS — M54.50 ACUTE EXACERBATION OF CHRONIC LOW BACK PAIN: Primary | ICD-10-CM

## 2022-07-24 DIAGNOSIS — G89.29 ACUTE EXACERBATION OF CHRONIC LOW BACK PAIN: Primary | ICD-10-CM

## 2022-07-24 LAB
ANION GAP SERPL CALCULATED.3IONS-SCNC: 4 MMOL/L (ref 4–13)
BASOPHILS # BLD AUTO: 0.02 THOUSANDS/ΜL (ref 0–0.1)
BASOPHILS NFR BLD AUTO: 0 % (ref 0–1)
BUN SERPL-MCNC: 53 MG/DL (ref 5–25)
CALCIUM SERPL-MCNC: 8.5 MG/DL (ref 8.3–10.1)
CARDIAC TROPONIN I PNL SERPL HS: 8 NG/L
CHLORIDE SERPL-SCNC: 99 MMOL/L (ref 96–108)
CO2 SERPL-SCNC: 31 MMOL/L (ref 21–32)
CREAT SERPL-MCNC: 8.36 MG/DL (ref 0.6–1.3)
EOSINOPHIL # BLD AUTO: 0.15 THOUSAND/ΜL (ref 0–0.61)
EOSINOPHIL NFR BLD AUTO: 3 % (ref 0–6)
ERYTHROCYTE [DISTWIDTH] IN BLOOD BY AUTOMATED COUNT: 13.9 % (ref 11.6–15.1)
GFR SERPL CREATININE-BSD FRML MDRD: 4 ML/MIN/1.73SQ M
GLUCOSE SERPL-MCNC: 357 MG/DL (ref 65–140)
HCT VFR BLD AUTO: 24.4 % (ref 34.8–46.1)
HGB BLD-MCNC: 7.7 G/DL (ref 11.5–15.4)
IMM GRANULOCYTES # BLD AUTO: 0.03 THOUSAND/UL (ref 0–0.2)
IMM GRANULOCYTES NFR BLD AUTO: 1 % (ref 0–2)
LYMPHOCYTES # BLD AUTO: 1.08 THOUSANDS/ΜL (ref 0.6–4.47)
LYMPHOCYTES NFR BLD AUTO: 20 % (ref 14–44)
MCH RBC QN AUTO: 30.9 PG (ref 26.8–34.3)
MCHC RBC AUTO-ENTMCNC: 31.6 G/DL (ref 31.4–37.4)
MCV RBC AUTO: 98 FL (ref 82–98)
MONOCYTES # BLD AUTO: 0.68 THOUSAND/ΜL (ref 0.17–1.22)
MONOCYTES NFR BLD AUTO: 13 % (ref 4–12)
NEUTROPHILS # BLD AUTO: 3.47 THOUSANDS/ΜL (ref 1.85–7.62)
NEUTS SEG NFR BLD AUTO: 63 % (ref 43–75)
NRBC BLD AUTO-RTO: 0 /100 WBCS
PLATELET # BLD AUTO: 161 THOUSANDS/UL (ref 149–390)
PMV BLD AUTO: 10.9 FL (ref 8.9–12.7)
POTASSIUM SERPL-SCNC: 5.8 MMOL/L (ref 3.5–5.3)
RBC # BLD AUTO: 2.49 MILLION/UL (ref 3.81–5.12)
SODIUM SERPL-SCNC: 134 MMOL/L (ref 135–147)
WBC # BLD AUTO: 5.43 THOUSAND/UL (ref 4.31–10.16)

## 2022-07-24 PROCEDURE — 80048 BASIC METABOLIC PNL TOTAL CA: CPT

## 2022-07-24 PROCEDURE — 84484 ASSAY OF TROPONIN QUANT: CPT

## 2022-07-24 PROCEDURE — 36415 COLL VENOUS BLD VENIPUNCTURE: CPT

## 2022-07-24 PROCEDURE — 85025 COMPLETE CBC W/AUTO DIFF WBC: CPT

## 2022-07-24 PROCEDURE — 99285 EMERGENCY DEPT VISIT HI MDM: CPT

## 2022-07-24 PROCEDURE — 99285 EMERGENCY DEPT VISIT HI MDM: CPT | Performed by: EMERGENCY MEDICINE

## 2022-07-24 PROCEDURE — 71045 X-RAY EXAM CHEST 1 VIEW: CPT

## 2022-07-24 RX ORDER — OXYCODONE HYDROCHLORIDE 5 MG/1
2.5 TABLET ORAL ONCE
Status: COMPLETED | OUTPATIENT
Start: 2022-07-24 | End: 2022-07-24

## 2022-07-24 RX ORDER — OXYCODONE HYDROCHLORIDE 5 MG/1
5 TABLET ORAL ONCE
Status: COMPLETED | OUTPATIENT
Start: 2022-07-24 | End: 2022-07-25

## 2022-07-24 RX ADMIN — OXYCODONE HYDROCHLORIDE 2.5 MG: 5 TABLET ORAL at 21:52

## 2022-07-24 NOTE — ED ATTENDING ATTESTATION
7/24/2022  I saw and evaluated the patient  I have discussed the patient with the resident physician and agree with the resident's findings, assessment and plan as documented in the resident physician's note, unless otherwise documented below  All available laboratory and imaging studies were reviewed by myself  I was present for key portions of any procedure(s) performed by the resident and I was immediately available to provide assistance  I agree with the current assessment done in the Emergency Department  I have conducted an independent evaluation of this patient  Emergency Department Note- Noa Jackson 56 y o  female MRN: [id number]    Unit/Bed#: [facility] Encounter: [id number]    Chief Complaint   Patient presents with    Chest Pain     Intermittent CP since this AM  "Pressure" denies n/v    Back Pain     Ongoing for several weeks  R leg numbness and tingling       Noa Jackson is a 56 y o  female with PMH of ESRD on HD TRS, DM, PE, chronic low back pain, presenting with chest pain and low back pain  Patient developed worsening right-sided low back pain which radiates down right leg with some numbness and tingling in the back of the leg  She has tried Tylenol without much relief  No recent injuries or trauma to the back  Pain is so bothersome patient can barely walk due to pain  This morning, patient developed some chest tightness, not worsened with exertion  No shortness of breath  No nausea or vomiting  Tightness does not appear to be worsened with respiratory cycle  Patient has had similar pain previously during dialysis sessions, and it always improves with some supplemental O2, though patient is not typically on supplemental O2  Pain does not radiate      REVIEW OF SYSTEMS    Constitutional: denies fevers, chills  Visual/Eyes: no changes in vision  HENT: no rhinorrhea, no sore throat  Cardiac: as above chest pain  Respiratory: no shortness of breath, no cough  GI: no abdominal pain, nausea, vomiting, or diarrhea  MSK: as above  Heme/Onc: no easy bruising  Endocrine: history of diabetes  Neuro: RLE numbness and tingling    Ten systems reviewed and negative unless otherwise noted in HPI and above    PAST MEDICAL HISTORY  Past Medical History:   Diagnosis Date    Abnormal uterine bleeding (AUB)     Anemia     Anxiety     Arthritis     Chronic kidney disease     Claustrophobia     Diabetes mellitus [facility] )     Disease of thyroid gland     DVT (deep venous thrombosis) (HCC)     End stage kidney disease (HCC)     Foot ulcer due to secondary DM ([facility] )     [facility]  )     Tues, Thurs, Sat    Hypertension     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[facility] )     right eye    Morbid obesity ([facility]     Osteomyelitis of fifth toe of right foot ([facility]     Panic attacks     Pulmonary embolism (HCC)     Reflux esophagitis     Thrombophlebitis of saphenous vein     Warfarin anticoagulation        SURGICAL HISTORY  Past Surgical History:   Procedure Laterality Date    AMPUTATION      r 4th toe    ARTERIOVENOUS GRAFT PLACEMENT      CATARACT EXTRACTION Bilateral     CERVICAL BIOPSY  W/ LOOP ELECTRODE EXCISION       SECTION      DIALYSIS FISTULA CREATION      DILATION AND CURETTAGE OF UTERUS      ENDOMETRIAL ABLATION W/ NOVASURE      EYE SURGERY      HYSTERECTOMY      @ age 57 (complete)    IR AV FISTULAGRAM/GRAFTOGRAM  10/11/2019    IR AV FISTULAGRAM/GRAFTOGRAM  2020    IR AV FISTULAGRAM/GRAFTOGRAM  2020    IR NON-TUNNELED CENTRAL LINE PLACEMENT  2021    IR NON-TUNNELED CENTRAL LINE PLACEMENT  10/4/2021    OOPHORECTOMY Bilateral     @ age 57    PERICARDIUM SURGERY      UT AMPUTATION FOOT,TRANSMETATARSAL Right 2021    Procedure: AMPUTATION TRANSMETATARSAL (TMA);  Surgeon: Joann Oropeza DPM;  [facility];  Service: Podiatry    UT AMPUTATION TOE,MT-P JT Right 2020    Procedure: AMPUTATION TOE- 5th;  Surgeon: Joann Oropeza DPM;  [facility];  Service: Podiatry    LA COLONOSCOPY FLX DX W/COLLJ SPEC WHEN PFRMD N/A 3/13/2019    Procedure: COLONOSCOPY;  Surgeon: Serafin Sands MD;  [facility]: [facility]; Service: Gastroenterology    LA ESOPHAGOGASTRODUODENOSCOPY TRANSORAL DIAGNOSTIC N/A 3/13/2019    Procedure: ESOPHAGOGASTRODUODENOSCOPY (EGD); Surgeon: Serafin Sands MD;  [facility]; Service: Gastroenterology    LA LAPAROSCOPY W TOT HYSTERECT UTERUS 250 GRAM OR LESS N/A 2/16/2016    Procedure: HYSTERECTOMY LAPAROSCOPIC TOTAL [facility]), with bilateral salpingectomy;  Surgeon: Kimberley Florentino DO;  [facility];  Service: Gynecology    THROMBECTOMY / ARTERIOVENOUS GRAFT REVISION      TOE SURGERY Right     removal of the 4th toe    WOUND DEBRIDEMENT Right 10/8/2021    Procedure: R 1st MTPJ washout ;  Surgeon: Mamie Kwan DPM;  [facility];  Service: Podiatry       FAMILY HISTORY  Family History   Problem Relation Age of Onset    Heart disease Family     Diabetes Family     Heart disease Mother     Cancer Brother         neck    Throat cancer Brother     Ovarian cancer Maternal Aunt 36        CURRENT MEDICATIONS  No current facility-administered medications on file prior to encounter  Current Outpatient Medications on File Prior to Encounter   Medication Sig    Accu-Chek Guide test strip use to TEST BLOOD SUGAR two to three times a day    Accu-Chek Softclix Lancets lancets 3 (three) times a day Use to test blood sugar    albuterol (ProAir HFA) 90 mcg/act inhaler Inhale 2 puffs every 6 (six) hours as needed for wheezing or shortness of breath    ALPRAZolam (XANAX) 0 25 mg tablet Take 0 25 mg by mouth 2 (two) times a day as needed for anxiety    ALPRAZolam (XANAX) 0 5 mg tablet TAKE 2 AND 1/2 TABLETS DAILY IN DIVIDED DOSES AS DIRECTED      ammonium lactate (LAC-HYDRIN) 12 % cream Apply topically 2 (two) times a day    atorvastatin (LIPITOR) 20 mg tablet Take 20 mg by mouth every evening     B Complex-C-Folic Acid (Natalia-Denys) TABS TAKE ONE TABLET BY MOUTH DAILY    carvedilol (COREG) 25 mg tablet TAKE ONE TABLET BY MOUTH TWICE A DAY    cinacalcet (SENSIPAR) 30 mg tablet Take 30 mg by mouth daily    dicyclomine (BENTYL) 10 mg capsule TAKE 1 TABLET BY MOUTH EVERY 6 HOURS OR AS NEEDED FOR PAIN    insulin glargine (LANTUS) 100 units/mL subcutaneous injection Inject 12 Units under the skin daily at bedtime    KIONEX 15 GM/60ML suspension Take by mouth Takes as a shake on dialysis days Tues-Thurs_Sat     levothyroxine 50 mcg tablet Take 50 mcg by mouth daily    lidocaine (LIDODERM) 5 %     loratadine (CLARITIN) 10 mg tablet Take 10 mg by mouth daily    melatonin 3 mg Take 1 tablet (3 mg total) by mouth daily at bedtime    methocarbamol (ROBAXIN) 500 mg tablet Take 1 tablet (500 mg total) by mouth 2 (two) times a day    metoclopramide (REGLAN) 10 mg/10 mL oral solution Take 5 mL (5 mg total) by mouth every 6 (six) hours as needed (nausea)    NIFEdipine (ADALAT CC) 30 MG 24 hr tablet Take 1 tablet (30 mg total) by mouth daily    NOVOLOG FLEXPEN 100 units/mL SOPN INJECT 1 TO 5 UNITS SUBCUTANEOUSLY THREE TIMES A DAY BEFORE MELAS AS PER SLIDING SCALE    nystatin (MYCOSTATIN) powder Apply topically 2 (two) times a day    ondansetron (ZOFRAN-ODT) 8 mg disintegrating tablet     pantoprazole (PROTONIX) 40 mg tablet take 1 tablet by mouth twice a day    polyethylene glycol (MIRALAX) 17 g packet Take 17 g by mouth daily for 7 days    polyethylene glycol (MIRALAX) 17 g packet Take 17 g by mouth daily    pregabalin (LYRICA) 50 mg capsule Take 1 PO daily, take 1 PO additionally directly have HD on HD days    senna (SENOKOT) 8 6 mg Take 2 tablets (17 2 mg total) by mouth daily at bedtime as needed for constipation    sertraline (ZOLOFT) 50 mg tablet Take 1 tablet (50 mg total) by mouth daily (Patient taking differently: Take 75 mg by mouth in the morning )    sevelamer (RENAGEL) 800 mg tablet Take 2 tablets (1,600 mg total) by mouth 3 (three) times a day with meals    torsemide (DEMADEX) 100 mg tablet Take 1 tablet (100 mg total) by mouth 4 (four) times a week On Sunday, Monday, Wednesday, Friday (Patient taking differently: Take 100 mg by mouth 4 (four) times a week On Sunday, Monday, Wednesday, Friday (pt states she takes it everyday))    urea (CARMOL) 40 % APPLY TO AFFECTED AREA TOPICALLY EVERY DAY    warfarin (COUMADIN) 3 mg tablet Take 3 tablets (9 mg total) by mouth daily Takes 9 mg Monday Sat    zolpidem (AMBIEN) 5 mg tablet Take 5 mg by mouth daily at bedtime       ALLERGIES  Allergies   Allergen Reactions    Iodinated Diagnostic Agents Itching    Keflex [Cephalexin] Hives    Eggs Or Egg-Derived Products - Food Allergy Rash    Wound Dressing Adhesive Rash       SOCIAL HISTORY  Social History     Socioeconomic History    Marital status: Single     Spouse name: None    Number of children: None    Years of education: None    Highest education level: None   Occupational History    None   Tobacco Use    Smoking status: Never Smoker    Smokeless tobacco: Never Used   Vaping Use    Vaping Use: Never used   Substance and Sexual Activity    Alcohol use: Never     Alcohol/week: 0 0 standard drinks    Drug use: No    Sexual activity: Never     Partners: Male     Birth control/protection: Abstinence   Other Topics Concern    None   Social History Narrative    None     Social Determinants of Health     Financial Resource Strain: Not on file   Food Insecurity: No Food Insecurity    Worried About Running Out of Food in the Last Year: Never true    Rojas of Food in the Last Year: Never true   Transportation Needs: No Transportation Needs    Lack of Transportation (Medical): No    Lack of Transportation (Non-Medical):  No   Physical Activity: Not on file   Stress: Not on file   Social Connections: Not on file   Intimate Partner Violence: Not on file   Housing Stability: Low Risk     Unable to Pay for Housing in the Last Year: No    Number of Places Lived in the Last Year: 0    Unstable Housing in the Last Year: No       PHYSICAL EXAM  /68 (BP Location: Right arm)   Pulse 78   Resp 18   LMP 02/12/2016 (Exact Date)   SpO2 99%   Vital signs and nursing notes reviewed    CONSTITUTIONAL: female appearing stated age resting in bed, in no acute distress  HEENT: atraumatic, normocephalic  Sclera anicteric, conjunctiva are not injected  Moist oral mucosa  CARDIOVASCULAR/CHEST: RRR, no M/R/G  2+ radial pulses  Left forearm AV fistula  PULMONARY: Breathing comfortably on RA  Breath sounds are equal and clear to auscultation  ABDOMEN: non-distended  BS present, normoactive  Non-tender  MSK: moves all extremities  No edema  Right foot toe amputations  There is tenderness with palpation of right paraspinal muscle group and right SI joint  Positive straight leg test  Patient fires hip flexors, hamstrings, tibialis anterior, gastrosoleus, EHL and FHL  Sensation to light touch is mildly diminished in right posterior thigh and leg  NEURO: Awake, alert, and oriented x 3  Face symmetric  Moves all extremities spontaneously   As per MSK exam otherwise  SKIN: Warm, appears well-perfused  MENTAL STATUS: Normal affect        DIAGNOSTIC STUDIES  Results Reviewed     Procedure Component Value Units Date/Time    HS Troponin I 2hr [[id number]]  (Normal) Collected: 07/24/22 2313    Lab Status: Final result Specimen: Blood from Arm, Right Updated: 07/25/22 0006     hs TnI 2hr 8 ng/L      Delta 2hr hsTnI 0 ng/L     HS Troponin 0hr (reflex protocol) [[id number]]  (Normal) Collected: 07/24/22 2044    Lab Status: Final result Specimen: Blood from Arm, Right Updated: 07/24/22 2123     hs TnI 0hr 8 ng/L     Basic metabolic panel [[id number]]  (Abnormal) Collected: 07/24/22 2044    Lab Status: Final result Specimen: Blood from Arm, Right Updated: 07/24/22 2110     Sodium 134 mmol/L      Potassium 5 8 mmol/L      Chloride 99 mmol/L      CO2 31 mmol/L ANION GAP 4 mmol/L      BUN 53 mg/dL      Creatinine 8 36 mg/dL      Glucose 357 mg/dL      Calcium 8 5 mg/dL      eGFR 4 ml/min/1 73sq m     Narrative:      National Kidney Disease Foundation guidelines for Chronic Kidney Disease (CKD):     Stage 1 with normal or high GFR (GFR > 90 mL/min/1 73 square meters)    Stage 2 Mild CKD (GFR = 60-89 mL/min/1 73 square meters)    Stage 3A Moderate CKD (GFR = 45-59 mL/min/1 73 square meters)    Stage 3B Moderate CKD (GFR = 30-44 mL/min/1 73 square meters)    Stage 4 Severe CKD (GFR = 15-29 mL/min/1 73 square meters)    Stage 5 End Stage CKD (GFR <15 mL/min/1 73 square meters)  Note: GFR calculation is accurate only with a steady state creatinine    CBC and differential [[id number]]  (Abnormal) Collected: 07/24/22 2044    Lab Status: Final result Specimen: Blood from Arm, Right Updated: 07/24/22 2103     WBC 5 43 Thousand/uL      RBC 2 49 Million/uL      Hemoglobin 7 7 g/dL      Hematocrit 24 4 %      MCV 98 fL      MCH 30 9 pg      MCHC 31 6 g/dL      RDW 13 9 %      MPV 10 9 fL      Platelets 991 Thousands/uL      nRBC 0 /100 WBCs      Neutrophils Relative 63 %      Immat GRANS % 1 %      Lymphocytes Relative 20 %      Monocytes Relative 13 %      Eosinophils Relative 3 %      Basophils Relative 0 %      Neutrophils Absolute 3 47 Thousands/µL      Immature Grans Absolute 0 03 Thousand/uL      Lymphocytes Absolute 1 08 Thousands/µL      Monocytes Absolute 0 68 Thousand/µL      Eosinophils Absolute 0 15 Thousand/µL      Basophils Absolute 0 02 Thousands/µL           XR chest 1 view portable   ED Interpretation   Poor inspiratory effort;  no acute cardiopulmonary process as read by me      Final Result      No acute cardiopulmonary disease                    Workstation performed: [id number]             PROCEDURES  ECG 12 Lead Documentation Only    Date/Time: 7/24/2022 9:30 PM  Performed by: Oh Castillo MD  Authorized by: Oh Castillo MD     Comments:      Normal sinus rhythm, VR 77, , QRS 96, QTc 434, normal axis, non-specific T wave flattening in high lateral lead aVL, no STEMI, overall, no significant change from prior EKG dated 7/9/2022  HEART Risk Score    Flowsheet Row Most Recent Value   Heart Score Risk Calculator    History 0 Filed at: 07/24/2022 2100   ECG 0 Filed at: 07/24/2022 2100   Age 1 Filed at: 07/24/2022 2100   Risk Factors 2 Filed at: 07/24/2022 2100   Troponin 0 Filed at: 07/24/2022 2100   HEART Score 3 Filed at: 07/24/2022 2100                ED COURSE  Medications   oxyCODONE (ROXICODONE) IR tablet 2 5 mg (2 5 mg Oral Given 7/24/22 2152)   oxyCODONE (ROXICODONE) IR tablet 5 mg (5 mg Oral Given 7/25/22 0019)     56 y o  female presenting with back pain  VS reviewed  Differential diagnosis includes musculoskeletal strain, sprain, radiculopathy, versus another pathology  No red flags such as history of IVDU, malignancy, trauma, saddle anesthesia, difficulty with bowel or bladder control to suggest etiologies such as cauda equina compression due to hematoma, abscess, broad-based disk herniation, transverse myelitis, pathologic fracture, or other emergent/sinister etiology underlying the pain  Concerning chest pain, DDx includes acute coronary syndrome, pericarditis, myocarditis, PE, pneumonia, pneumothorax, dissection, pleurisy, musculoskeletal pain, versus another etiology of symptoms  EKG obtained, as reviewed by me as above  History, physical exam and data collected so far do not support ACS, PE, pneumonia  Trop 8 repeat unchanged  CXR without obvious abnormalities though study is limited by poor inspiratory effort  HEART score is 3  Patient discharged to home with recommendations for symptom control, return precautions, and plan for follow up         CLINICAL IMPRESSION  Final diagnoses:   Acute exacerbation of chronic low back pain   Chest pain, unspecified

## 2022-07-25 ENCOUNTER — TELEPHONE (OUTPATIENT)
Dept: PAIN MEDICINE | Facility: CLINIC | Age: 57
End: 2022-07-25

## 2022-07-25 DIAGNOSIS — M54.16 LUMBAR RADICULOPATHY: Chronic | ICD-10-CM

## 2022-07-25 DIAGNOSIS — E11.42 DIABETIC POLYNEUROPATHY ASSOCIATED WITH TYPE 2 DIABETES MELLITUS (HCC): ICD-10-CM

## 2022-07-25 LAB
2HR DELTA HS TROPONIN: 0 NG/L
CARDIAC TROPONIN I PNL SERPL HS: 8 NG/L

## 2022-07-25 RX ORDER — PREGABALIN 50 MG/1
CAPSULE ORAL
Qty: 45 CAPSULE | Refills: 2 | Status: SHIPPED | OUTPATIENT
Start: 2022-07-25

## 2022-07-25 RX ADMIN — OXYCODONE HYDROCHLORIDE 5 MG: 5 TABLET ORAL at 00:19

## 2022-07-25 NOTE — TELEPHONE ENCOUNTER
S/w pt  Pt states that she is still unable to get oob d/t pain  Pt was given Roxicodone once in the ER and one dose to go home and it did help her pain  Please review ER note and advise  Pt is requesting a refill of Lyrica, pt takes 50 mg daily and 50 mg on dialysis days  Pt states that it does not help her pain and she does not have any se's  Pt took her past pill this morning  OVS scheduled with [facility] on 7/27  Any further recommendations?

## 2022-07-25 NOTE — TELEPHONE ENCOUNTER
Tiffanie Jeff  [phone]    Tiffanie Jeff from [facility], is calling in asking if the DR, can give her something else for her pain  They said that pt has been coming in late to her appts lately   Then when she's there she is very uncomfortable due to her back pain, the way their seats are

## 2022-07-25 NOTE — TELEPHONE ENCOUNTER
Patient   [phone]  Dr Johnathon Vidal    Pt was in ED yesterday she said that she couldn't even moved  Please view ED note and follow back up with pt   She was given meds while at the ED, they follow with [facility] [place]

## 2022-07-25 NOTE — TELEPHONE ENCOUNTER
Multiple attempts made to return call to [facility]  [phone]  Received a busy signal every time  Pt does have an appt on 7/27 with [address]

## 2022-07-25 NOTE — ED PROVIDER NOTES
History  Chief Complaint   Patient presents with    Chest Pain     Intermittent CP since this AM  "Pressure" denies n/v    Back Pain     Ongoing for several weeks  R leg numbness and tingling     Patient is a 68-year-old female with multiple medical problems, including chronic back pain, end-stage renal disease on hemodialysis Tuesday Thursday Saturday, diabetes, PE, presenting to the ED for evaluation of exacerbation of chronic low back pain and chest tightness  Patient states over the last few days she has been experiencing significantly worse right lower back pain now with radiation to the mid leg area with numbness and tingling  Patient states that she has had numbness and tingling down her right leg in the past   Because of her significant kidney disease she is unable to take any adequate pain medication  States that Tylenol is not helping her  She has been evaluated by chronic pain management and instructed to get a medical marijuana card, as this may be 1 of the few options that she has left  Patient states she cannot for the medical marijuana card at this time  Patient denies any acute triggers for her most recent episodes of pain, no trauma to the back  She denies IV drug abuse, saddle anesthesias, and urinary or bowel incontinence  Patient states that she feels she is unable to walk well because of the pain  Other than her back pain, patient states this morning she developed some anterior chest wall tightness  She denies chest pain  States that the chest tightness is not associated with any exertion  She denies feeling short of breath, diaphoretic, pleuritic component to the discomfort  States that she has occasionally had chest tightness during dialysis appointments, but this has been improved with oxygen during those dialysis appointments  Patient is not on home oxygen at all      Patient denies headache, fevers, cough or congestion, wheezing, radiation of the chest pain to the jaw, pain between the shoulder blades, abdominal pain, nausea, vomiting, diarrhea, urinary complaints  Prior to Admission Medications   Prescriptions Last Dose Informant Patient Reported? Taking? ALPRAZolam (XANAX) 0 25 mg tablet  Self Yes No   Sig: Take 0 25 mg by mouth 2 (two) times a day as needed for anxiety   ALPRAZolam (XANAX) 0 5 mg tablet   Yes No   Sig: TAKE 2 AND 1/2 TABLETS DAILY IN DIVIDED DOSES AS DIRECTED     Accu-Chek Guide test strip   Yes No   Sig: use to TEST BLOOD SUGAR two to three times a day   Accu-Chek Softclix Lancets lancets  Self Yes No   Sig: 3 (three) times a day Use to test blood sugar   B Complex-C-Folic Acid (Natalia-Denys) TABS   No No   Sig: TAKE ONE TABLET BY MOUTH DAILY   KIONEX 15 GM/60ML suspension  Self Yes No   Sig: Take by mouth Takes as a shake on dialysis days Tues-Thurs_Sat    NIFEdipine (ADALAT CC) 30 MG 24 hr tablet   No No   Sig: Take 1 tablet (30 mg total) by mouth daily   NOVOLOG FLEXPEN 100 units/mL SOPN  Self Yes No   Sig: INJECT 1 TO 5 UNITS SUBCUTANEOUSLY THREE TIMES A DAY BEFORE MELAS AS PER SLIDING SCALE   albuterol (ProAir HFA) 90 mcg/act inhaler   No No   Sig: Inhale 2 puffs every 6 (six) hours as needed for wheezing or shortness of breath   ammonium lactate (LAC-HYDRIN) 12 % cream   No No   Sig: Apply topically 2 (two) times a day   atorvastatin (LIPITOR) 20 mg tablet  Self Yes No   Sig: Take 20 mg by mouth every evening    carvedilol (COREG) 25 mg tablet   No No   Sig: TAKE ONE TABLET BY MOUTH TWICE A DAY   cinacalcet (SENSIPAR) 30 mg tablet  Self Yes No   Sig: Take 30 mg by mouth daily   dicyclomine (BENTYL) 10 mg capsule   Yes No   Sig: TAKE 1 TABLET BY MOUTH EVERY 6 HOURS OR AS NEEDED FOR PAIN   insulin glargine (LANTUS) 100 units/mL subcutaneous injection   No No   Sig: Inject 12 Units under the skin daily at bedtime   levothyroxine 50 mcg tablet  Self Yes No   Sig: Take 50 mcg by mouth daily   lidocaine (LIDODERM) 5 %   Yes No   loratadine (CLARITIN) 10 mg tablet  Self Yes No   Sig: Take 10 mg by mouth daily   melatonin 3 mg   No No   Sig: Take 1 tablet (3 mg total) by mouth daily at bedtime   methocarbamol (ROBAXIN) 500 mg tablet   No No   Sig: Take 1 tablet (500 mg total) by mouth 2 (two) times a day   metoclopramide (REGLAN) 10 mg/10 mL oral solution   No No   Sig: Take 5 mL (5 mg total) by mouth every 6 (six) hours as needed (nausea)   nystatin (MYCOSTATIN) powder  Self No No   Sig: Apply topically 2 (two) times a day   ondansetron (ZOFRAN-ODT) 8 mg disintegrating tablet   Yes No   pantoprazole (PROTONIX) 40 mg tablet   Yes No   Sig: take 1 tablet by mouth twice a day   polyethylene glycol (MIRALAX) 17 g packet   No No   Sig: Take 17 g by mouth daily for 7 days   polyethylene glycol (MIRALAX) 17 g packet   No No   Sig: Take 17 g by mouth daily   pregabalin (LYRICA) 50 mg capsule   No No   Sig: Take 1 PO daily, take 1 PO additionally directly have HD on HD days   senna (SENOKOT) 8 6 mg  Self No No   Sig: Take 2 tablets (17 2 mg total) by mouth daily at bedtime as needed for constipation   sertraline (ZOLOFT) 50 mg tablet  Self No No   Sig: Take 1 tablet (50 mg total) by mouth daily   Patient taking differently: Take 75 mg by mouth in the morning     sevelamer (RENAGEL) 800 mg tablet   No No   Sig: Take 2 tablets (1,600 mg total) by mouth 3 (three) times a day with meals   torsemide (DEMADEX) 100 mg tablet   No No   Sig: Take 1 tablet (100 mg total) by mouth 4 (four) times a week On Sunday, Monday, Wednesday, Friday   Patient taking differently: Take 100 mg by mouth 4 (four) times a week On Sunday, Monday, Wednesday, Friday (pt states she takes it everyday)   urea (CARMOL) 40 %   No No   Sig: APPLY TO AFFECTED AREA TOPICALLY EVERY DAY   warfarin (COUMADIN) 3 mg tablet  Self No No   Sig: Take 3 tablets (9 mg total) by mouth daily Takes 9 mg Monday Sat   zolpidem (AMBIEN) 5 mg tablet   Yes No   Sig: Take 5 mg by mouth daily at bedtime      Facility-Administered Medications: None       Past Medical History:   Diagnosis Date    Abnormal uterine bleeding (AUB)     Anemia     Anxiety     Arthritis     Chronic kidney disease     Claustrophobia     [facility]     Disease of thyroid gland     DVT (deep venous thrombosis) (HCC)     End stage kidney disease (HCC)     Foot ulcer due to secondary DM ([facility] )     Hemodialysis patient ([facility] )     Tues, Thurs, Sat    Hypertension     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[facility] )     right eye    Morbid obesity ([facility] )     Osteomyelitis of fifth toe of right foot (HCC)     Panic attacks     Pulmonary embolism (HCC)     Reflux esophagitis     Thrombophlebitis of saphenous vein     Warfarin anticoagulation        Past Surgical History:   Procedure Laterality Date    AMPUTATION      r 4th toe    ARTERIOVENOUS GRAFT PLACEMENT      CATARACT EXTRACTION Bilateral     CERVICAL BIOPSY  W/ LOOP ELECTRODE EXCISION       SECTION      DIALYSIS FISTULA CREATION      DILATION AND CURETTAGE OF UTERUS      ENDOMETRIAL ABLATION W/ NOVASURE      EYE SURGERY      HYSTERECTOMY      @ age 57 (complete)    IR AV FISTULAGRAM/GRAFTOGRAM  10/11/2019    IR AV FISTULAGRAM/GRAFTOGRAM  2020    IR AV FISTULAGRAM/GRAFTOGRAM  2020    IR NON-TUNNELED CENTRAL LINE PLACEMENT  2021    IR NON-TUNNELED CENTRAL LINE PLACEMENT  10/4/2021    OOPHORECTOMY Bilateral     @ age 57    PERICARDIUM SURGERY      [place] AMPUTATION FOOT,TRANSMETATARSAL Right 2021    Procedure: AMPUTATION TRANSMETATARSAL (TMA);  Surgeon: Josy Vizcaino DPM;  [facility];  Service: Podiatry    [place] AMPUTATION TOE,MT-P JT Right 2020    Procedure: AMPUTATION TOE- 5th;  Surgeon: Josy Vizcaino DPM;  [facility];  Service: Podiatry    WY COLONOSCOPY FLX DX W/COLLJ SPEC WHEN PFRMD N/A 3/13/2019    Procedure: COLONOSCOPY;  Surgeon: Bashir Valentin MD;  [facility];   Service: Gastroenterology    W[place] ESOPHAGOGASTRODUODENOSCOPY TRANSORAL DIAGNOSTIC N/A 3/13/2019    Procedure: ESOPHAGOGASTRODUODENOSCOPY (EGD); Surgeon: Veda Sanchez MD;  [facility]: [facility]; Service: Gastroenterology    [place] LAPAROSCOPY W TOT HYSTERECT UTERUS 250 GRAM OR LESS N/A 2/16/2016    Procedure: HYSTERECTOMY LAPAROSCOPIC TOTAL [facility]), with bilateral salpingectomy;  Surgeon: Manuel Casiano DO;  [facility];  Service: Gynecology    THROMBECTOMY / ARTERIOVENOUS GRAFT REVISION      TOE SURGERY Right     removal of the 4th toe    WOUND DEBRIDEMENT Right 10/8/2021    Procedure: R 1st MTPJ washout ;  Surgeon: Abeba Tinoco DPM;  [facility];  Service: Podiatry       Family History   Problem Relation Age of Onset    Heart disease Family     Diabetes Family     Heart disease Mother     Cancer Brother         neck    Throat cancer Brother     Ovarian cancer Maternal Aunt 36     I have reviewed and agree with the history as documented  E-Cigarette/Vaping    E-Cigarette Use Never User      E-Cigarette/Vaping Substances    Nicotine No     THC No     CBD No     Flavoring No     Other No     Unknown No      Social History     Tobacco Use    Smoking status: Never Smoker    Smokeless tobacco: Never Used   Vaping Use    Vaping Use: Never used   Substance Use Topics    Alcohol use: Never     Alcohol/week: 0 0 standard drinks    Drug use: No        Review of Systems   Constitutional: Negative for activity change, chills, fatigue and fever  HENT: Negative for congestion, ear pain, facial swelling, rhinorrhea, sinus pressure, sinus pain and sore throat  Eyes: Negative for pain and visual disturbance  Respiratory: Positive for chest tightness  Negative for cough, shortness of breath and wheezing  Cardiovascular: Negative for chest pain and palpitations  Gastrointestinal: Negative for abdominal pain, diarrhea, nausea and vomiting  Genitourinary: Positive for decreased urine volume (chronic ESRD)   Negative for difficulty urinating, dysuria, flank pain, frequency, hematuria and pelvic pain  Musculoskeletal: Positive for back pain and gait problem  Negative for arthralgias, neck pain and neck stiffness  Skin: Negative for color change and rash  Neurological: Positive for numbness (paresthesias R leg)  Negative for dizziness, seizures, syncope, light-headedness and headaches  All other systems reviewed and are negative  Physical Exam  ED Triage Vitals   Temperature Pulse Respirations Blood Pressure SpO2   07/24/22 2157 07/24/22 1928 07/24/22 1928 07/24/22 1928 07/24/22 1928   98 1 °F (36 7 °C) 78 18 153/68 99 %      Temp Source Heart Rate Source Patient Position - Orthostatic VS BP Location FiO2 (%)   07/24/22 2157 07/24/22 1928 07/24/22 1928 07/24/22 1928 --   Oral Monitor Lying Right arm       Pain Score       07/24/22 1928       10 - Worst Possible Pain             Orthostatic Vital Signs  Vitals:    07/24/22 1928 07/24/22 2157 07/24/22 2321   BP: 153/68 132/55    Pulse: 78 75 77   Patient Position - Orthostatic VS: Lying Lying        Physical Exam  Vitals and nursing note reviewed  Constitutional:       General: She is not in acute distress  Appearance: She is well-developed  She is obese  She is not ill-appearing or toxic-appearing  HENT:      Head: Normocephalic and atraumatic  Eyes:      Extraocular Movements: Extraocular movements intact  Conjunctiva/sclera: Conjunctivae normal       Pupils: Pupils are equal, round, and reactive to light  Neck:      Vascular: No JVD  Cardiovascular:      Rate and Rhythm: Normal rate and regular rhythm  Pulses:           Radial pulses are 2+ on the right side and 2+ on the left side  Heart sounds: Normal heart sounds  No murmur heard  Comments: AV fistula L forearm    Pulmonary:      Effort: Pulmonary effort is normal  No accessory muscle usage or respiratory distress  Breath sounds: Normal breath sounds   No decreased breath sounds, wheezing, rhonchi or rales  Chest:      Chest wall: No mass or tenderness  Abdominal:      General: Bowel sounds are normal       Palpations: Abdomen is soft  Tenderness: There is no abdominal tenderness  There is no guarding or rebound  Musculoskeletal:      Cervical back: Normal and neck supple  Thoracic back: Normal       Lumbar back: Tenderness (R paralumbar area) present  No swelling, edema or deformity  Positive right straight leg raise test       Right lower leg: No tenderness  Comments: R  Toe amputation (all 5 toes)    Patient able to shift position on the ED stretcher without significant pain in the R lower back  Skin:     General: Skin is warm and dry  Neurological:      Mental Status: She is alert           ED Medications  Medications   oxyCODONE (ROXICODONE) IR tablet 2 5 mg (2 5 mg Oral Given 7/24/22 2152)   oxyCODONE (ROXICODONE) IR tablet 5 mg (5 mg Oral Given 7/25/22 0019)       Diagnostic Studies  Results Reviewed     Procedure Component Value Units Date/Time    HS Troponin I 2hr [[id number]]  (Normal) Collected: 07/24/22 2313    Lab Status: Final result Specimen: Blood from Arm, Right Updated: 07/25/22 0006     hs TnI 2hr 8 ng/L      Delta 2hr hsTnI 0 ng/L     HS Troponin I 4hr [[id number]]     Lab Status: No result Specimen: Blood     HS Troponin 0hr (reflex protocol) [[id number]]  (Normal) Collected: 07/24/22 2044    Lab Status: Final result Specimen: Blood from Arm, Right Updated: 07/24/22 2123     hs TnI 0hr 8 ng/L     Basic metabolic panel [[id number]]  (Abnormal) Collected: 07/24/22 2044    Lab Status: Final result Specimen: Blood from Arm, Right Updated: 07/24/22 2110     Sodium 134 mmol/L      Potassium 5 8 mmol/L      Chloride 99 mmol/L      CO2 31 mmol/L      ANION GAP 4 mmol/L      BUN 53 mg/dL      Creatinine 8 36 mg/dL      Glucose 357 mg/dL      Calcium 8 5 mg/dL      eGFR 4 ml/min/1 73sq m     Narrative:      [place] guidelines for Chronic Kidney Disease (CKD):     Stage 1 with normal or high GFR (GFR > 90 mL/min/1 73 square meters)    Stage 2 Mild CKD (GFR = 60-89 mL/min/1 73 square meters)    Stage 3A Moderate CKD (GFR = 45-59 mL/min/1 73 square meters)    Stage 3B Moderate CKD (GFR = 30-44 mL/min/1 73 square meters)    Stage 4 Severe CKD (GFR = 15-29 mL/min/1 73 square meters)    Stage 5 End Stage CKD (GFR <15 mL/min/1 73 square meters)  Note: GFR calculation is accurate only with a steady state creatinine    CBC and differential [[id number]]  (Abnormal) Collected: 07/24/22 2044    Lab Status: Final result Specimen: Blood from Arm, Right Updated: 07/24/22 2103     WBC 5 43 Thousand/uL      RBC 2 49 Million/uL      Hemoglobin 7 7 g/dL      Hematocrit 24 4 %      MCV 98 fL      MCH 30 9 pg      MCHC 31 6 g/dL      RDW 13 9 %      MPV 10 9 fL      Platelets 278 Thousands/uL      nRBC 0 /100 WBCs      Neutrophils Relative 63 %      Immat GRANS % 1 %      Lymphocytes Relative 20 %      Monocytes Relative 13 %      Eosinophils Relative 3 %      Basophils Relative 0 %      Neutrophils Absolute 3 47 Thousands/µL      Immature Grans Absolute 0 03 Thousand/uL      Lymphocytes Absolute 1 08 Thousands/µL      Monocytes Absolute 0 68 Thousand/µL      Eosinophils Absolute 0 15 Thousand/µL      Basophils Absolute 0 02 Thousands/µL                  XR chest 1 view portable   ED Interpretation by Nathan Ennis DO (07/24 2036)   Poor inspiratory effort;  no acute cardiopulmonary process as read by me            Procedures  Procedures      ED Course  ED Course as of 07/25/22 0030   Sun Jul 24, 2022 2114 BUN(!): 53   2114 Creatinine(!): 8 36  Improved from prior     2114 Hemoglobin(!): 7 7  Near baseline; prior 8 1 2135 hs TnI 0hr: 8  Will need delta trop     2136 EKG: NSR at 77 bpm, normal axis, normal intervals, no acute ST-T changes   2231 ON re-evaluation, patient is sitting upright in bed  States that her pain is slightly better   Waiting on repeat troponin  Patient without chest pain or tightness at this time  Mon Jul 25, 2022   0011 hs TnI 2hr: 8        patient is having chest tightness, therefore will do cardiac workup with troponin, EKG and chest x-ray  Given her status as an end-stage renal disease patient with a GFR of 3, there is limited options for pain control  Prior Nephrology notes advocate against NSAIDs for this patient  Patient also is a diabetic and therefore there would be more harm than benefit for steroids at this time  Patient given Roxicodone 2 5 mg PO for pain control  EKG and relevant labs documented above  On re-evaluation, patient asking for second dose of pain medication  Advised patient that opioids are not generally used for chronic pain management, but given patient's limited options for pain control, will do 1 more dose of Roxicodone to facilitate pain control  Patient is thankful and understands that narcotics are not used as general pain regimens  I stressed the importance of following up with her chronic pain management specialist for other options in pain control and she is agreeable with this  Awaiting 2nd troponin result  Patient remains asymptomatic in terms of chest pain symptoms  Patient's back pain mildly improved  Explained to patient that she needs to follow up with chronic pain  States that she has a neurosurgery appointment on 8/8 to discuss treatment options  Delta troponin is negative  Patient is stable for discharge  I reviewed all testing with the patient:   I gave oral return precautions for what to return for in addition to the written return precautions  The patient verbalized understanding of the discharge instructions and warnings that would necessitate return to the Emergency Department  I specifically highlighted areas of special concern regarding the written and verbal discharge instructions and return precautions      All questions were answered prior to discharge  MDM    Disposition  Final diagnoses:   Acute exacerbation of chronic low back pain   Chest pain, unspecified     Time reflects when diagnosis was documented in both MDM as applicable and the Disposition within this note     Time User Action Codes Description Comment    7/25/2022 12:12 AM Mara Carter Add [M54 50,  G89 29] Acute exacerbation of chronic low back pain     7/25/2022 12:12 AM Mara Carter Add [R07 9] Chest pain, unspecified       ED Disposition     ED Disposition   Discharge    Condition   Stable    Date/Time   Mon Jul 25, 2022 12:11 AM    Comment   Vinod Walton discharge to home/self care  Follow-up Information     Follow up With Specialties Details Why Contact Info    Marlo Maurer MD Internal Medicine Schedule an appointment as soon as possible for a visit   [facility]  [address] [place] [address]      Marlo Maurer MD Internal Medicine   [facility]  [address]            Patient's Medications   Discharge Prescriptions    No medications on file     No discharge procedures on file  PDMP Review       Value Time User    PDMP Reviewed  Yes 7/9/2022 11:11 PM Laura Thomason PA-C           ED Provider  Attending physically available and evaluated Vinod Walton  I managed the patient along with the ED Attending      Electronically Signed by         Wei Roblero DO  07/25/22 0030

## 2022-07-25 NOTE — DISCHARGE INSTRUCTIONS
Please follow up with your chronic pain management specialist in order to discuss long-term options for pain control  You may need a facet injection into your lower back to help control the pain  Please continue to follow your regular dialysis schedule  Advise Tylenol and lidocaine patches the help with back pain  Gentle stretching as well  Chest pain workup in the ED was negative for any acute cardiac abnormalities  Return to the ED with any new/concerning symptoms

## 2022-07-25 NOTE — TELEPHONE ENCOUNTER
Prescription for Lyrica refilled  Will discuss further treatment strategy at next office visit    No further recommendations at this time

## 2022-07-27 ENCOUNTER — OFFICE VISIT (OUTPATIENT)
Dept: PAIN MEDICINE | Facility: CLINIC | Age: 57
End: 2022-07-27
Payer: MEDICARE

## 2022-07-27 VITALS
DIASTOLIC BLOOD PRESSURE: 40 MMHG | HEIGHT: 66 IN | HEART RATE: 79 BPM | SYSTOLIC BLOOD PRESSURE: 136 MMHG | BODY MASS INDEX: 46.12 KG/M2

## 2022-07-27 DIAGNOSIS — G89.29 CHRONIC RIGHT-SIDED LOW BACK PAIN, UNSPECIFIED WHETHER SCIATICA PRESENT: Primary | ICD-10-CM

## 2022-07-27 DIAGNOSIS — Z79.891 LONG-TERM CURRENT USE OF OPIATE ANALGESIC: ICD-10-CM

## 2022-07-27 DIAGNOSIS — F11.20 UNCOMPLICATED OPIOID DEPENDENCE (HCC): ICD-10-CM

## 2022-07-27 DIAGNOSIS — M46.1 SACROILIITIS (HCC): ICD-10-CM

## 2022-07-27 DIAGNOSIS — G89.4 CHRONIC PAIN SYNDROME: ICD-10-CM

## 2022-07-27 DIAGNOSIS — M54.50 CHRONIC RIGHT-SIDED LOW BACK PAIN, UNSPECIFIED WHETHER SCIATICA PRESENT: Primary | ICD-10-CM

## 2022-07-27 PROCEDURE — 99214 OFFICE O/P EST MOD 30 MIN: CPT | Performed by: NURSE PRACTITIONER

## 2022-07-27 RX ORDER — GABAPENTIN 100 MG/1
CAPSULE ORAL
COMMUNITY
Start: 2022-07-13 | End: 2022-08-04

## 2022-07-27 RX ORDER — SEVELAMER CARBONATE FOR ORAL SUSPENSION 2400 MG/1
POWDER, FOR SUSPENSION ORAL
COMMUNITY
Start: 2022-07-15 | End: 2022-08-21

## 2022-07-27 NOTE — PROGRESS NOTES
Assessment:  1  Chronic right-sided low back pain, unspecified whether sciatica present    2  Uncomplicated opioid dependence ([address] )    3  Long-term current use of opiate analgesic    4  Chronic pain syndrome    5  Sacroiliitis ([address] ) - Right        Plan:  1  While the patient was in the office today, I discussed with the patient that as per our medication management office protocol, we do not prescribe any opioid medications before we obtain a baseline drug screen from every patient  The patient was agreeable and a baseline drug screen was collected today  We will discuss the official results at the patient's next office visit  May consider Norco if baseline drug screen is appropriate  She understands that this is not intended to be long-term and only to bridge her until a more permanent plan is established with Neurosurgery    2  Patient may benefit from a right SI joint injection, however will await input from Neurosurgery before proceeding with any type of interventional procedures  3  Patient was encouraged to follow with Neurosurgery as scheduled   4  Patient will follow up in 4 weeks or sooner if needed    History of Present Illness: The patient is a 56 y o  female with a history of DVT on anticoagulation, end-stage renal disease on hemodialysis, and diabetes with neuropathy last seen on 6/15/22 who presents for a follow up office visit in regards to chronic right-sided low back pain that is nonradiating into the lower extremities  She denies bowel or bladder incontinence or saddle anesthesia  Patient was admitted into the hospital on July 9, 2022  An MRI of the lumbar spine was performed which did show some suspicion for potential osteomyelitis/diskitis at L4-5  A CRP and sed rate was drawn in the hospital and Neurosurgery was consulted    Per admission note, neurosurgery felt there was low concern for osteomyelitis and diskitis and patient is scheduled for a follow-up visit outpatient on August 11,    She takes pregabalin 50 mg daily without relief  Medication options are quite limited secondary to ESRD  She has failed gabapentin in the past   She gets some relief with oxycodone    Patient rates her pain as 10/10 on the numeric pain rating scale  She constantly has pain in the morning which is described as burning and pressure-like    I have personally reviewed and/or updated the patient's past medical history, past surgical history, family history, social history, current medications, allergies, and vital signs today  Review of Systems:    Review of Systems   Respiratory: Negative for shortness of breath  Cardiovascular: Negative for chest pain  Gastrointestinal: Negative for constipation, diarrhea, nausea and vomiting  Musculoskeletal: Positive for gait problem  Negative for arthralgias, joint swelling and myalgias  Skin: Negative for rash  Neurological: Negative for dizziness, seizures and weakness  All other systems reviewed and are negative          Past Medical History:   Diagnosis Date    Abnormal uterine bleeding (AUB)     Anemia     Anxiety     Arthritis     Chronic kidney disease     Claustrophobia     Diabetes mellitus [facility]     Disease of thyroid gland     [facility]     End stage kidney disease (HCC)     Foot ulcer due to secondary DM [facility]     Hemodialysis patient ([facility] )     Tues, Thurs, Sat    Hypertension     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[facility] )     right eye    Morbid obesity ([facility] )     Osteomyelitis of fifth toe of right foot (HCC)     Panic attacks     Pulmonary embolism ([facility])     Reflux esophagitis     Thrombophlebitis of saphenous vein     Warfarin anticoagulation        Past Surgical History:   Procedure Laterality Date    AMPUTATION      r 4th toe    ARTERIOVENOUS GRAFT PLACEMENT      CATARACT EXTRACTION Bilateral     CERVICAL BIOPSY  W/ LOOP ELECTRODE EXCISION       SECTION      DIALYSIS FISTULA CREATION      DILATION AND CURETTAGE OF UTERUS      ENDOMETRIAL ABLATION W/ NOVASURE      EYE SURGERY      HYSTERECTOMY      @ age 57 (complete)    IR AV FISTULAGRAM/GRAFTOGRAM  10/11/2019    IR AV FISTULAGRAM/GRAFTOGRAM  8/12/2020    IR AV FISTULAGRAM/GRAFTOGRAM  12/23/2020    IR NON-TUNNELED CENTRAL LINE PLACEMENT  6/29/2021    IR NON-TUNNELED CENTRAL LINE PLACEMENT  10/4/2021    OOPHORECTOMY Bilateral     @ age 57    [facility] Right 12/26/2021    Procedure: AMPUTATION TRANSMETATARSAL (TMA);  Surgeon: Josy Vizcaino DPM;  [facility];  Service: Podiatry    PA AMPUTATION TOE,MT-P JT Right 5/28/2020    Procedure: AMPUTATION TOE- 5th;  Surgeon: Josy Vizcaino DPM;  [facility]: [facility];  Service: Podiatry    PA COLONOSCOPY FLX DX W/COLLJ [address] PFRMD N/A 3/13/2019    Procedure: COLONOSCOPY;  Surgeon: Bashir Valentin MD;  Location: [facility]; Service: Gastroenterology    PA ESOPHAGOGASTRODUODENOSCOPY TRANSORAL DIAGNOSTIC N/A 3/13/2019    Procedure: ESOPHAGOGASTRODUODENOSCOPY (EGD); Surgeon: Bashir Valentin MD;  [facility]: [facility];   Service: Gastroenterology    PA LAPAROSCOPY W TOT HYSTERECT UTERUS 250 GRAM OR LESS N/A 2/16/2016    Procedure: HYSTERECTOMY LAPAROSCOPIC TOTAL [facility]), with bilateral salpingectomy;  Surgeon: Kelvin Vargas DO;  [facility];  Service: Gynecology    THROMBECTOMY / ARTERIOVENOUS GRAFT REVISION      TOE SURGERY Right     removal of the 4th toe    WOUND DEBRIDEMENT Right 10/8/2021    Procedure: R 1st MTPJ washout ;  Surgeon: Tony Gamino DPM;  [facility];  Service: Podiatry       Family History   Problem Relation Age of Onset    Heart disease Family     Diabetes Family     Heart disease Mother     Cancer Brother         neck    Throat cancer Brother     Ovarian cancer Maternal Aunt 36       Social History     Occupational History    Not on file   Tobacco Use    Smoking status: Never Smoker  Smokeless tobacco: Never Used   Vaping Use    Vaping Use: Never used   Substance and Sexual Activity    Alcohol use: Never     Alcohol/week: 0 0 standard drinks    Drug use: No    Sexual activity: Never     Partners: Male     Birth control/protection: Abstinence         Current Outpatient Medications:     Accu-Chek Guide test strip, use to TEST BLOOD SUGAR two to three times a day, Disp: , Rfl:     Accu-Chek Softclix Lancets lancets, 3 (three) times a day Use to test blood sugar, Disp: , Rfl:     albuterol (ProAir HFA) 90 mcg/act inhaler, Inhale 2 puffs every 6 (six) hours as needed for wheezing or shortness of breath, Disp: 8 5 g, Rfl: 0    ALPRAZolam (XANAX) 0 25 mg tablet, Take 0 25 mg by mouth 2 (two) times a day as needed for anxiety, Disp: , Rfl:     ALPRAZolam (XANAX) 0 5 mg tablet, TAKE 2 AND 1/2 TABLETS DAILY IN DIVIDED DOSES AS DIRECTED , Disp: , Rfl:     ammonium lactate (LAC-HYDRIN) 12 % cream, Apply topically 2 (two) times a day, Disp: 385 g, Rfl: 0    atorvastatin (LIPITOR) 20 mg tablet, Take 20 mg by mouth every evening , Disp: , Rfl: 0    B Complex-C-Folic Acid (Natalia-Denys) TABS, TAKE ONE TABLET BY MOUTH DAILY, Disp: 90 tablet, Rfl: 3    carvedilol (COREG) 25 mg tablet, TAKE ONE TABLET BY MOUTH TWICE A DAY, Disp: 180 tablet, Rfl: 7    cinacalcet (SENSIPAR) 30 mg tablet, Take 30 mg by mouth daily, Disp: , Rfl:     dicyclomine (BENTYL) 10 mg capsule, TAKE 1 TABLET BY MOUTH EVERY 6 HOURS OR AS NEEDED FOR PAIN, Disp: , Rfl:     gabapentin (NEURONTIN) 100 mg capsule, , Disp: , Rfl:     insulin glargine (LANTUS) 100 units/mL subcutaneous injection, Inject 12 Units under the skin daily at bedtime, Disp: 10 mL, Rfl: 0    KIONEX 15 GM/60ML suspension, Take by mouth Takes as a shake on dialysis days Tues-Thurs_Sat , Disp: , Rfl: 0    levothyroxine 50 mcg tablet, Take 50 mcg by mouth daily, Disp: , Rfl:     lidocaine (LIDODERM) 5 %, , Disp: , Rfl:     loratadine (CLARITIN) 10 mg tablet, Take 10 mg by mouth daily, Disp: , Rfl:     melatonin 3 mg, Take 1 tablet (3 mg total) by mouth daily at bedtime, Disp: 30 tablet, Rfl: 0    methocarbamol (ROBAXIN) 500 mg tablet, Take 1 tablet (500 mg total) by mouth 2 (two) times a day, Disp: 20 tablet, Rfl: 0    metoclopramide (REGLAN) 10 mg/10 mL oral solution, Take 5 mL (5 mg total) by mouth every 6 (six) hours as needed (nausea), Disp: 120 mL, Rfl: 1    NIFEdipine (ADALAT CC) 30 MG 24 hr tablet, Take 1 tablet (30 mg total) by mouth daily, Disp: 30 tablet, Rfl: 0    NOVOLOG FLEXPEN 100 units/mL SOPN, INJECT 1 TO 5 UNITS SUBCUTANEOUSLY THREE TIMES A DAY BEFORE MELAS AS PER SLIDING SCALE, Disp: , Rfl:     nystatin (MYCOSTATIN) powder, Apply topically 2 (two) times a day, Disp: 15 g, Rfl: 0    ondansetron (ZOFRAN-ODT) 8 mg disintegrating tablet, , Disp: , Rfl:     pantoprazole (PROTONIX) 40 mg tablet, take 1 tablet by mouth twice a day, Disp: , Rfl:     polyethylene glycol (MIRALAX) 17 g packet, Take 17 g by mouth daily for 7 days, Disp: 119 g, Rfl: 0    polyethylene glycol (MIRALAX) 17 g packet, Take 17 g by mouth daily, Disp: 510 g, Rfl: 5    pregabalin (LYRICA) 50 mg capsule, Take 1 PO daily, take 1 PO additionally directly have HD on HD days, Disp: 45 capsule, Rfl: 2    senna (SENOKOT) 8 6 mg, Take 2 tablets (17 2 mg total) by mouth daily at bedtime as needed for constipation, Disp: 30 each, Rfl: 0    sertraline (ZOLOFT) 50 mg tablet, Take 1 tablet (50 mg total) by mouth daily (Patient taking differently: Take 75 mg by mouth in the morning ), Disp: 30 tablet, Rfl: 0    sevelamer (RENAGEL) 800 mg tablet, Take 2 tablets (1,600 mg total) by mouth 3 (three) times a day with meals, Disp: 30 tablet, Rfl: 0    Sevelamer Carbonate 2 4 g PACK, STIR 1 PACKET INTO FOOD 3 TIMES A DAY WITH MEALS, Disp: , Rfl:     torsemide (DEMADEX) 100 mg tablet, Take 1 tablet (100 mg total) by mouth 4 (four) times a week On Sunday, Monday, Wednesday, Friday (Patient taking differently: Take 100 mg by mouth 4 (four) times a week On Sunday, Monday, Wednesday, Friday (pt states she takes it everyday)), Disp: , Rfl: 0    urea (CARMOL) 40 %, APPLY TO AFFECTED AREA TOPICALLY EVERY DAY, Disp: 85 g, Rfl: 2    warfarin (COUMADIN) 3 mg tablet, Take 3 tablets (9 mg total) by mouth daily Takes 9 mg Monday Sat, Disp: 10 tablet, Rfl: 0    zolpidem (AMBIEN) 5 mg tablet, Take 5 mg by mouth daily at bedtime, Disp: , Rfl:     Allergies   Allergen Reactions    Iodinated Diagnostic Agents Itching    Keflex [Cephalexin] Hives    Eggs Or Egg-Derived Products - Food Allergy Rash    Wound Dressing Adhesive Rash       Physical Exam:    BP (!) 136/40   Pulse 79   Ht 5' 6" (1 676 m)   LMP 02/12/2016 (Exact Date)   BMI 46 12 kg/m²     Constitutional:normal, well developed, well nourished, alert, in no distress and non-toxic and no overt pain behavior  Eyes:anicteric  HEENT:grossly intact  Neck:supple, symmetric, trachea midline and no masses   Pulmonary:even and unlabored  Cardiovascular:No edema or pitting edema present  Skin:Normal without rashes or lesions and well hydrated  Psychiatric:Mood and affect appropriate  Neurologic:Cranial Nerves II-XII grossly intact  Musculoskeletal:antalgic gait, ambulates with a cane  Right SI joint tender to palpation  Bilateral lower extremity strength 5/5 in all muscle groups  Negative straight leg raise bilaterally  Positive Jeremy's, AP compression and Hanh finger test on the right      Imaging  No orders to display   MRI LUMBAR SPINE WITHOUT CONTRAST   INDICATION: lumbar spine pain worsening  Patient has confirmed COVID-19  COMPARISON: None  TECHNIQUE: Sagittal T1, sagittal T2, sagittal inversion recovery, axial T1 and axial T2, coronal T2    IMAGE QUALITY: Diagnostic   FINDINGS:   VERTEBRAL BODIES: There are 5 lumbar type vertebral bodies  Marrow edema about the L4-5 intervertebral disc space noted   There is also high signal in the intervertebral disc space  Type II Modic endplate changes noted at the T11, T12, L1 and L2   vertebrae endplates  There is no compression deformity  SACRUM:   Scattered degenerative endplate changes  No focally suspicious marrow lesions  No bone marrow edema or compression abnormality  DISTAL CORD AND CONUS: Normal size and signal within the distal cord and conus  The conus medullaris terminates at the T12-L1 level  PARASPINAL SOFT TISSUES: Bilateral renal atrophy with cystic changes noted suggesting end-stage renal disease  LOWER THORACIC DISC SPACES: Normal disc height and signal  No disc herniation, canal stenosis or foraminal narrowing  LUMBAR DISC SPACES:   L1-L2:   There is no focal disk herniation, central canal or neural foraminal narrowing  Bilateral facet hypertrophy noted  L2-L3: There is no focal disk herniation, central canal or neural foraminal narrowing  Bilateral facet hypertrophy noted  L3-L4: There is no focal disk herniation, central canal or neural foraminal narrowing  Bilateral facet hypertrophy noted  L4-L5: There is high signal in the intervertebral disc space  There is a central/right paracentral disc herniation, protrusion type extending into the right neural foramen  Large bilateral facet effusions are exemplified on series 6, image 19  Moderate to severe narrowing of the right neural foramen  Mild central canal narrowing  Moderate left neural foraminal narrowing  Slight endplate erosion about the L4-5 intervertebral disc space may represent spondylotic changes although early   infection/discitis/vasculitis could have a similar appearance  No large epidural or paraspinal collection  An adjacent psoas muscles are free of obvious collection  L5-S1: There is a diffuse disk bulge  No significant central canal or neural foraminal narrowing  Bilateral facet hypertrophy noted  IMPRESSION:   1   Marrow edema about the L4-5 intervertebral disc space with high signal in the intervertebral disc space, associated spondylotic narrowing secondary to disc herniation disc and bilateral facet effusions  Findings may be related to type I Modic   endplate changes and spondylosis  However, differential diagnosis includes early discitis/osteomyelitis  Further clinical assessment with laboratory values and follow-up advised  Recommend short-term repeat MRI of the lumbar spine in 6 weeks to assess   for stability  2  Bilateral renal atrophy with numerous small cysts correlating the history of end-stage renal disease           Orders Placed This Encounter   Procedures    MM OF_Alprazolam Definitive    MM OF_Amphetamine Definitive Test    MM OF_Atomoxetine Definitive Test    MM OF_Buprenorphine Definitive Test    MM OF_Clonazepam Definitive    MM OF_Cocaine Definitive Test    MM OF_Codeine Definitive    MM OF_Diazepam Definitive    MM OF_Fentanyl Definitive Test    MM OF_Heroin Definitive Test    MM OF_Hydrocodone Definitive    MM OF_Hydromorphone Definitive    MM OF_Lorazepam Definitive    MM OF_MDMA Definitive Test    MM OF_Meperidine Definitive Test    MM OF_Methadone Definitive Test    MM OF_Methylphenidate Definitive Test    MM OF_Morphine Definitive    MM OF_Oxazepam Definitive    MM OF_Oxycodone Definitive Test - Oral Fluid    MM OF_Oxymorphone Definitive Test    MM OF_Phencyclidine Definitive Test    MM OF_Temazepam Definitive    MM OF_THC Definitive Test    MM OF_Tramadol Definitive Test    MM ALL_Prescribed Meds and Special Instructions

## 2022-07-28 ENCOUNTER — TELEPHONE (OUTPATIENT)
Dept: PAIN MEDICINE | Facility: MEDICAL CENTER | Age: 57
End: 2022-07-28

## 2022-07-28 NOTE — TELEPHONE ENCOUNTER
Patient called stating she would like a call back she states was in the office yesterday and does not remember what Jordon Ho told her regarding infection and next steps she would like to speak to a Nurse  Please advise     Patient can be reached at [phone]   TY

## 2022-07-28 NOTE — TELEPHONE ENCOUNTER
Reviewed with the patient the ovs note from yesterday  She had questions about possible infection and inquired about the oral swab  I answered her questions and she stated she felt overwhelmed yesterday, emotional support provided and she  appreciated the CB  She stated she had a lot more pain today and she wanted you to be aware

## 2022-07-29 ENCOUNTER — HOSPITAL ENCOUNTER (INPATIENT)
Facility: HOSPITAL | Age: 57
LOS: 5 days | Discharge: HOME | DRG: 551 | End: 2022-08-04
Attending: EMERGENCY MEDICINE | Admitting: INTERNAL MEDICINE
Payer: MEDICARE

## 2022-07-29 ENCOUNTER — APPOINTMENT (OUTPATIENT)
Dept: RADIOLOGY | Facility: HOSPITAL | Age: 57
DRG: 551 | End: 2022-07-29
Payer: MEDICARE

## 2022-07-29 DIAGNOSIS — N18.6 ESRD ON HEMODIALYSIS (HCC): ICD-10-CM

## 2022-07-29 DIAGNOSIS — L03.90 CELLULITIS: ICD-10-CM

## 2022-07-29 DIAGNOSIS — Z99.2 ESRD ON HEMODIALYSIS (HCC): ICD-10-CM

## 2022-07-29 DIAGNOSIS — M46.46 DISCITIS OF LUMBAR REGION: ICD-10-CM

## 2022-07-29 DIAGNOSIS — M54.9 INTRACTABLE BACK PAIN: ICD-10-CM

## 2022-07-29 DIAGNOSIS — M54.9 BACK PAIN: Primary | ICD-10-CM

## 2022-07-29 LAB
ANION GAP SERPL CALCULATED.3IONS-SCNC: 6 MMOL/L (ref 4–13)
BASOPHILS # BLD AUTO: 0.02 THOUSANDS/ΜL (ref 0–0.1)
BASOPHILS NFR BLD AUTO: 0 % (ref 0–1)
BUN SERPL-MCNC: 47 MG/DL (ref 5–25)
CALCIUM SERPL-MCNC: 8.6 MG/DL (ref 8.3–10.1)
CHLORIDE SERPL-SCNC: 105 MMOL/L (ref 96–108)
CO2 SERPL-SCNC: 30 MMOL/L (ref 21–32)
CREAT SERPL-MCNC: 6.83 MG/DL (ref 0.6–1.3)
CRP SERPL QL: 20.8 MG/L
EOSINOPHIL # BLD AUTO: 0.11 THOUSAND/ΜL (ref 0–0.61)
EOSINOPHIL NFR BLD AUTO: 2 % (ref 0–6)
ERYTHROCYTE [DISTWIDTH] IN BLOOD BY AUTOMATED COUNT: 14.7 % (ref 11.6–15.1)
ERYTHROCYTE [SEDIMENTATION RATE] IN BLOOD: 24 MM/HOUR (ref 0–29)
GFR SERPL CREATININE-BSD FRML MDRD: 6 ML/MIN/1.73SQ M
GLUCOSE SERPL-MCNC: 205 MG/DL (ref 65–140)
GLUCOSE SERPL-MCNC: 317 MG/DL (ref 65–140)
GLUCOSE SERPL-MCNC: 43 MG/DL (ref 65–140)
GLUCOSE SERPL-MCNC: 72 MG/DL (ref 65–140)
HCT VFR BLD AUTO: 23.8 % (ref 34.8–46.1)
HGB BLD-MCNC: 7.5 G/DL (ref 11.5–15.4)
IMM GRANULOCYTES # BLD AUTO: 0.05 THOUSAND/UL (ref 0–0.2)
IMM GRANULOCYTES NFR BLD AUTO: 1 % (ref 0–2)
INR PPP: 1.99 (ref 0.84–1.19)
LYMPHOCYTES # BLD AUTO: 0.77 THOUSANDS/ΜL (ref 0.6–4.47)
LYMPHOCYTES NFR BLD AUTO: 17 % (ref 14–44)
MCH RBC QN AUTO: 31.6 PG (ref 26.8–34.3)
MCHC RBC AUTO-ENTMCNC: 31.5 G/DL (ref 31.4–37.4)
MCV RBC AUTO: 100 FL (ref 82–98)
MONOCYTES # BLD AUTO: 0.58 THOUSAND/ΜL (ref 0.17–1.22)
MONOCYTES NFR BLD AUTO: 12 % (ref 4–12)
NEUTROPHILS # BLD AUTO: 3.14 THOUSANDS/ΜL (ref 1.85–7.62)
NEUTS SEG NFR BLD AUTO: 68 % (ref 43–75)
NRBC BLD AUTO-RTO: 0 /100 WBCS
PLATELET # BLD AUTO: 114 THOUSANDS/UL (ref 149–390)
PMV BLD AUTO: 11.4 FL (ref 8.9–12.7)
POTASSIUM SERPL-SCNC: 5.3 MMOL/L (ref 3.5–5.3)
PROTHROMBIN TIME: 22.8 SECONDS (ref 11.6–14.5)
RBC # BLD AUTO: 2.37 MILLION/UL (ref 3.81–5.12)
SODIUM SERPL-SCNC: 141 MMOL/L (ref 135–147)
WBC # BLD AUTO: 4.67 THOUSAND/UL (ref 4.31–10.16)

## 2022-07-29 PROCEDURE — 36415 COLL VENOUS BLD VENIPUNCTURE: CPT

## 2022-07-29 PROCEDURE — 99214 OFFICE O/P EST MOD 30 MIN: CPT | Performed by: INTERNAL MEDICINE

## 2022-07-29 PROCEDURE — 99285 EMERGENCY DEPT VISIT HI MDM: CPT | Performed by: EMERGENCY MEDICINE

## 2022-07-29 PROCEDURE — 99285 EMERGENCY DEPT VISIT HI MDM: CPT

## 2022-07-29 PROCEDURE — 87040 BLOOD CULTURE FOR BACTERIA: CPT

## 2022-07-29 PROCEDURE — 86140 C-REACTIVE PROTEIN: CPT

## 2022-07-29 PROCEDURE — 96374 THER/PROPH/DIAG INJ IV PUSH: CPT

## 2022-07-29 PROCEDURE — 82948 REAGENT STRIP/BLOOD GLUCOSE: CPT

## 2022-07-29 PROCEDURE — 80048 BASIC METABOLIC PNL TOTAL CA: CPT

## 2022-07-29 PROCEDURE — 72131 CT LUMBAR SPINE W/O DYE: CPT

## 2022-07-29 PROCEDURE — 85025 COMPLETE CBC W/AUTO DIFF WBC: CPT

## 2022-07-29 PROCEDURE — G1004 CDSM NDSC: HCPCS

## 2022-07-29 PROCEDURE — 99220 PR INITIAL OBSERVATION CARE/DAY 70 MINUTES: CPT | Performed by: INTERNAL MEDICINE

## 2022-07-29 PROCEDURE — 85610 PROTHROMBIN TIME: CPT

## 2022-07-29 PROCEDURE — 85652 RBC SED RATE AUTOMATED: CPT

## 2022-07-29 PROCEDURE — 99223 1ST HOSP IP/OBS HIGH 75: CPT | Performed by: NURSE PRACTITIONER

## 2022-07-29 RX ORDER — HYDROMORPHONE HCL/PF 1 MG/ML
1 SYRINGE (ML) INJECTION ONCE
Status: COMPLETED | OUTPATIENT
Start: 2022-07-29 | End: 2022-07-29

## 2022-07-29 RX ORDER — GABAPENTIN 100 MG/1
100 CAPSULE ORAL DAILY
Status: DISCONTINUED | OUTPATIENT
Start: 2022-07-30 | End: 2022-08-01

## 2022-07-29 RX ORDER — INSULIN LISPRO 100 [IU]/ML
1-5 INJECTION, SOLUTION INTRAVENOUS; SUBCUTANEOUS
Status: DISCONTINUED | OUTPATIENT
Start: 2022-07-29 | End: 2022-08-04 | Stop reason: HOSPADM

## 2022-07-29 RX ORDER — NYSTATIN 100000 [USP'U]/G
POWDER TOPICAL 2 TIMES DAILY
Status: DISCONTINUED | OUTPATIENT
Start: 2022-07-29 | End: 2022-08-04 | Stop reason: HOSPADM

## 2022-07-29 RX ORDER — HEPARIN SODIUM 5000 [USP'U]/ML
5000 INJECTION, SOLUTION INTRAVENOUS; SUBCUTANEOUS EVERY 8 HOURS SCHEDULED
Status: DISCONTINUED | OUTPATIENT
Start: 2022-07-29 | End: 2022-08-01

## 2022-07-29 RX ORDER — DICYCLOMINE HYDROCHLORIDE 10 MG/1
10 CAPSULE ORAL
Status: DISCONTINUED | OUTPATIENT
Start: 2022-07-29 | End: 2022-08-04 | Stop reason: HOSPADM

## 2022-07-29 RX ORDER — SENNOSIDES 8.6 MG
2 TABLET ORAL
Status: DISCONTINUED | OUTPATIENT
Start: 2022-07-29 | End: 2022-08-01

## 2022-07-29 RX ORDER — ACETAMINOPHEN 160 MG/5ML
650 SUSPENSION, ORAL (FINAL DOSE FORM) ORAL EVERY 4 HOURS PRN
Status: DISCONTINUED | OUTPATIENT
Start: 2022-07-29 | End: 2022-07-29

## 2022-07-29 RX ORDER — ALBUTEROL SULFATE 90 UG/1
2 AEROSOL, METERED RESPIRATORY (INHALATION) EVERY 6 HOURS PRN
Status: DISCONTINUED | OUTPATIENT
Start: 2022-07-29 | End: 2022-08-04 | Stop reason: HOSPADM

## 2022-07-29 RX ORDER — PREGABALIN 50 MG/1
50 CAPSULE ORAL DAILY
Status: DISCONTINUED | OUTPATIENT
Start: 2022-07-30 | End: 2022-08-04 | Stop reason: HOSPADM

## 2022-07-29 RX ORDER — WARFARIN SODIUM 3 MG/1
9 TABLET ORAL
Status: DISCONTINUED | OUTPATIENT
Start: 2022-07-29 | End: 2022-08-04

## 2022-07-29 RX ORDER — HEPARIN SODIUM 5000 [USP'U]/ML
5000 INJECTION, SOLUTION INTRAVENOUS; SUBCUTANEOUS EVERY 8 HOURS SCHEDULED
Status: DISCONTINUED | OUTPATIENT
Start: 2022-07-29 | End: 2022-07-29

## 2022-07-29 RX ORDER — ACETAMINOPHEN 325 MG/1
650 TABLET ORAL EVERY 6 HOURS PRN
Status: DISCONTINUED | OUTPATIENT
Start: 2022-07-29 | End: 2022-07-30

## 2022-07-29 RX ORDER — INSULIN GLARGINE 100 [IU]/ML
20 INJECTION, SOLUTION SUBCUTANEOUS
Status: DISCONTINUED | OUTPATIENT
Start: 2022-07-29 | End: 2022-07-30

## 2022-07-29 RX ORDER — LIDOCAINE 50 MG/G
1 PATCH TOPICAL ONCE
Status: COMPLETED | OUTPATIENT
Start: 2022-07-29 | End: 2022-07-30

## 2022-07-29 RX ORDER — ATORVASTATIN CALCIUM 20 MG/1
20 TABLET, FILM COATED ORAL EVERY EVENING
Status: DISCONTINUED | OUTPATIENT
Start: 2022-07-29 | End: 2022-08-04 | Stop reason: HOSPADM

## 2022-07-29 RX ORDER — NIFEDIPINE 30 MG/1
30 TABLET, EXTENDED RELEASE ORAL DAILY
Status: DISCONTINUED | OUTPATIENT
Start: 2022-07-30 | End: 2022-08-01

## 2022-07-29 RX ORDER — OXYCODONE HYDROCHLORIDE 5 MG/1
5 TABLET ORAL EVERY 4 HOURS PRN
Status: DISCONTINUED | OUTPATIENT
Start: 2022-07-29 | End: 2022-08-01

## 2022-07-29 RX ORDER — CHOLECALCIFEROL (VITAMIN D3) 10 MCG
1 TABLET ORAL
Status: DISCONTINUED | OUTPATIENT
Start: 2022-07-29 | End: 2022-08-04 | Stop reason: HOSPADM

## 2022-07-29 RX ORDER — LEVOTHYROXINE SODIUM 0.05 MG/1
50 TABLET ORAL DAILY
Status: DISCONTINUED | OUTPATIENT
Start: 2022-07-30 | End: 2022-08-04 | Stop reason: HOSPADM

## 2022-07-29 RX ORDER — LORATADINE 10 MG/1
10 TABLET ORAL DAILY
Status: DISCONTINUED | OUTPATIENT
Start: 2022-07-30 | End: 2022-08-04 | Stop reason: HOSPADM

## 2022-07-29 RX ORDER — SEVELAMER HYDROCHLORIDE 800 MG/1
1600 TABLET, FILM COATED ORAL
Status: DISCONTINUED | OUTPATIENT
Start: 2022-07-29 | End: 2022-08-04 | Stop reason: HOSPADM

## 2022-07-29 RX ORDER — INSULIN GLARGINE 100 [IU]/ML
12 INJECTION, SOLUTION SUBCUTANEOUS
Status: DISCONTINUED | OUTPATIENT
Start: 2022-07-29 | End: 2022-07-29

## 2022-07-29 RX ORDER — PANTOPRAZOLE SODIUM 40 MG/1
40 TABLET, DELAYED RELEASE ORAL
Status: DISCONTINUED | OUTPATIENT
Start: 2022-07-30 | End: 2022-08-04 | Stop reason: HOSPADM

## 2022-07-29 RX ORDER — ALPRAZOLAM 0.25 MG/1
0.25 TABLET ORAL 2 TIMES DAILY PRN
Status: DISCONTINUED | OUTPATIENT
Start: 2022-07-29 | End: 2022-08-04 | Stop reason: HOSPADM

## 2022-07-29 RX ORDER — OXYCODONE HYDROCHLORIDE 10 MG/1
10 TABLET ORAL EVERY 4 HOURS PRN
Status: DISCONTINUED | OUTPATIENT
Start: 2022-07-29 | End: 2022-08-01

## 2022-07-29 RX ORDER — LANOLIN ALCOHOL/MO/W.PET/CERES
3 CREAM (GRAM) TOPICAL
Status: DISCONTINUED | OUTPATIENT
Start: 2022-07-29 | End: 2022-08-04 | Stop reason: HOSPADM

## 2022-07-29 RX ORDER — TORSEMIDE 20 MG/1
100 TABLET ORAL DAILY
Status: DISCONTINUED | OUTPATIENT
Start: 2022-07-30 | End: 2022-08-04 | Stop reason: HOSPADM

## 2022-07-29 RX ORDER — CARVEDILOL 25 MG/1
25 TABLET ORAL 2 TIMES DAILY
Status: DISCONTINUED | OUTPATIENT
Start: 2022-07-29 | End: 2022-08-04 | Stop reason: HOSPADM

## 2022-07-29 RX ORDER — INSULIN LISPRO 100 [IU]/ML
5 INJECTION, SOLUTION INTRAVENOUS; SUBCUTANEOUS
Status: DISCONTINUED | OUTPATIENT
Start: 2022-07-29 | End: 2022-07-30

## 2022-07-29 RX ORDER — ONDANSETRON 2 MG/ML
4 INJECTION INTRAMUSCULAR; INTRAVENOUS EVERY 4 HOURS PRN
Status: DISCONTINUED | OUTPATIENT
Start: 2022-07-29 | End: 2022-08-04 | Stop reason: HOSPADM

## 2022-07-29 RX ORDER — POLYETHYLENE GLYCOL 3350 17 G/17G
17 POWDER, FOR SOLUTION ORAL DAILY
Status: DISCONTINUED | OUTPATIENT
Start: 2022-07-30 | End: 2022-08-04 | Stop reason: HOSPADM

## 2022-07-29 RX ORDER — LIDOCAINE 50 MG/G
1 PATCH TOPICAL DAILY
Status: DISCONTINUED | OUTPATIENT
Start: 2022-07-30 | End: 2022-08-04 | Stop reason: HOSPADM

## 2022-07-29 RX ORDER — OXYCODONE HYDROCHLORIDE AND ACETAMINOPHEN 5; 325 MG/1; MG/1
1 TABLET ORAL ONCE
Status: DISCONTINUED | OUTPATIENT
Start: 2022-07-29 | End: 2022-07-29

## 2022-07-29 RX ORDER — CINACALCET 30 MG/1
30 TABLET, FILM COATED ORAL DAILY
Status: DISCONTINUED | OUTPATIENT
Start: 2022-07-30 | End: 2022-08-04 | Stop reason: HOSPADM

## 2022-07-29 RX ADMIN — ACETAMINOPHEN 650 MG: 325 TABLET ORAL at 19:33

## 2022-07-29 RX ADMIN — NYSTATIN: 100000 POWDER TOPICAL at 17:19

## 2022-07-29 RX ADMIN — LIDOCAINE 5% 1 PATCH: 700 PATCH TOPICAL at 13:33

## 2022-07-29 RX ADMIN — DICYCLOMINE HYDROCHLORIDE 10 MG: 10 CAPSULE ORAL at 17:18

## 2022-07-29 RX ADMIN — INSULIN LISPRO 5 UNITS: 100 INJECTION, SOLUTION INTRAVENOUS; SUBCUTANEOUS at 17:21

## 2022-07-29 RX ADMIN — ATORVASTATIN CALCIUM 20 MG: 20 TABLET, FILM COATED ORAL at 17:18

## 2022-07-29 RX ADMIN — HYDROMORPHONE HYDROCHLORIDE 1 MG: 1 INJECTION, SOLUTION INTRAMUSCULAR; INTRAVENOUS; SUBCUTANEOUS at 13:33

## 2022-07-29 RX ADMIN — NEPHROCAP 1 CAPSULE: 1 CAP ORAL at 17:18

## 2022-07-29 RX ADMIN — INSULIN LISPRO 1 UNITS: 100 INJECTION, SOLUTION INTRAVENOUS; SUBCUTANEOUS at 17:21

## 2022-07-29 RX ADMIN — SEVELAMER HYDROCHLORIDE 1600 MG: 800 TABLET ORAL at 17:18

## 2022-07-29 RX ADMIN — WARFARIN SODIUM 9 MG: 3 TABLET ORAL at 17:18

## 2022-07-29 RX ADMIN — OXYCODONE HYDROCHLORIDE 10 MG: 10 TABLET ORAL at 17:18

## 2022-07-29 NOTE — ASSESSMENT & PLAN NOTE
· Patient with intractable back pain since last admission  · MRI on July 12th with questionable ostial versus diskitis with evidence of disc herniation  · Given increasing nature of pain will repeat MRI the LS spine  Check CRP  · Neuro surgical evaluation  · PT evaluation  · Low suspicion of osteo/diskitis at this time will hold off on antibiotics pending subspecialty evaluation further imaging    Patient's neurological exam is benign

## 2022-07-29 NOTE — ASSESSMENT & PLAN NOTE
Patient complaining of severe right-sided low back pain with bilateral lower extremity radiculopathy right more than left  · Prior MRI demonstrates moderately advanced degenerative lumbar spine disease at multi levels  Also L4-5 disc herniation with moderate central canal stenosis  · Prior x-rays also demonstrated anteriorlisthesis at L4-5 level  · Doubt clinical infection in lumbar spine  · Patient was recently hospitalized in early July after a fall she was also found to be COVID positive  There was some concern for L4-5 osteomyelitis/diskitis  · Also history of right foot diabetic ulcer, osteomyelitis/MRSA infection with prior treatment with IV antibiotics  Also status post right foot amputation in 2021  Imaging:    MRI lumbar spine wo 7/12/22:  Marrow edema about the L4-5 intervertebral disc space with high signal in the intervertebral disc space, associated spondylotic narrowing secondary to disc herniation disc and bilateral facet effusions  Findings may be related to type I Modic   endplate changes and spondylosis  However, differential diagnosis includes early discitis/osteomyelitis  Further clinical assessment with laboratory values and follow-up advised  Bilateral renal atrophy with numerous small cysts correlating the history of end-stage renal disease  Lumbar spine x-ray 7/13/22:Anterolisthesis at L4-L5, this is not seen on prior CT or MRI  This may be in part due to patient being imaged in the upright position on this exam  However, given concern for discitis/osteomyelitis further evaluation with MRI of the lumbar spine using intravenous contrast is recommended  Plan:  · Exam: R HF weakness 3-4-/5 2/2 pain   · Reviewed prior images with patient and significant other  · Recommend repeat MRI lumbar spine w/wo  · Discussed with Farrukh Houser with Nephrology  Patient needs to have hemodialysis after MRI  Discussed with team they will help to arrange for timing for MRI to be done    Internal medicine and MRI department also aware  · Also ordered CT lumbar spine wo to further assess pedicles and facets  · Nephrology following, input appreciated  · Spoke with both nephrology and MRI team MRI likely will be done late Sunday early Monday morning in conjunction with dialysis    · Medical management and pain control per primary team  · CRP 20 8 down from 23 9  · ESR 24 down from 26  · BCs x2 from 7/29 pending  · Patient can wear LSO brace p r n  for comfort  · DVT PPX: SCDs and heparin    Neurosurgery will follow through the periphery over the weekend until imaging completed, call with any further questions or concerns

## 2022-07-29 NOTE — TELEPHONE ENCOUNTER
Aware and agree with recommendation  No further recommendations at this time and awaiting results of blood work as well as oral drug screen    Medication options extremely limited secondary to patient's comorbidities

## 2022-07-29 NOTE — CONSULTS
[facility] 56 y o  female MRN: [id number]  Unit/Bed#: ED 23 Encounter: [id number]    ASSESSMENT and PLAN:    56year old female with a history of end-stage renal disease on hemodialysis, anemia of chronic disease, DVT who presents for significant lower back pain  She is being admitted for observation  Nephrology consult for ESRD management  End Stage Kidney Disease  -Modality:In-Center Hemodialysis  -Schedule: Tuesday/Thursday/Saturday  -Dialysis Unit: [facility]  -Access: Left Arm AV Fistula  -Presciption:  Reviewed  -Status:  Underwent hemodialysis yesterday  No indication for renal placement therapy today   -Plan:   · Will plan for hemodialysis tomorrow as patient is being admitted tonight for observation  · Dialysis orders placed by the advanced practitioner  · Overall stable from renal standpoint for discharge once team stable from medical standpoint    Anemia of chronic disease  --receives IV iron as an outpatient  --not on Epogen due to history of PE and DVT    Back pain  --was evaluated during last hospitalization with MRI and also seen by Neurosurgery and Infectious Disease  Not felt to be related to diskitis or osteomyelitis  --patient plan for repeat MRI originally as an outpatient on August 11th may possibly done earlier during this hospitalization    History of COVID-19    Mineral bone disorder-chronic kidney disease  --continue home medications    Hypertension  --examines euvolemic    SUMMARY OF RECOMMENDATIONS:    Please see above    HISTORY OF PRESENT ILLNESS:  Requesting Physician: Prem Chavez MD  Reason for Consult:  ESRD    Yossi Reynaga is a 56 y o  female who was admitted to [facility] after presenting with back pain  A renal consultation is requested today for assistance in the management of ESRD  Patient was recently discharged and had been treated for COVID-19 who now presents for worsening back pain    Patient was evaluated for this back pain during a prior hospitalization with MRI neurosurgery Infectious Disease who did not feel that this was related to diskitis or osteomyelitis  She presents for 10 on 10 back pain  No chest pain or shortness of breath      PAST MEDICAL HISTORY:  Past Medical History:   Diagnosis Date    Abnormal uterine bleeding (AUB)     Anemia     Anxiety     Arthritis     Chronic kidney disease     Claustrophobia     Diabetes mellitus ([facility] [facility]     Disease of thyroid gland     DVT (deep venous thrombosis) (HCC)     End stage kidney disease (HCC)     Foot ulcer due to secondary DM ([facility]     Hemodialysis patient ([facility] )     Tues, Thurs, Sat    Hypertension     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[facility] [facility] )     right eye    Morbid obesity ([facility] [facility]     Osteomyelitis of fifth toe of right foot ([facility] [facility]     Panic attacks     Pulmonary embolism (HCC)     Reflux esophagitis     Thrombophlebitis of saphenous vein     Warfarin anticoagulation        PAST SURGICAL HISTORY:  Past Surgical History:   Procedure Laterality Date    AMPUTATION      r 4th toe    ARTERIOVENOUS GRAFT PLACEMENT      CATARACT EXTRACTION Bilateral     CERVICAL BIOPSY  W/ LOOP ELECTRODE EXCISION       SECTION      DIALYSIS FISTULA CREATION      DILATION AND CURETTAGE OF UTERUS      ENDOMETRIAL ABLATION W/ NOVASURE      EYE SURGERY      HYSTERECTOMY      @ age 57 (complete)    IR AV FISTULAGRAM/GRAFTOGRAM  10/11/2019    IR AV FISTULAGRAM/GRAFTOGRAM  2020    IR AV FISTULAGRAM/GRAFTOGRAM  2020    IR NON-TUNNELED CENTRAL LINE PLACEMENT  2021    IR NON-TUNNELED CENTRAL LINE PLACEMENT  10/4/2021    OOPHORECTOMY Bilateral     @ age 57    PERICARDIUM SURGERY      DC AMPUTATION FOOT,TRANSMETATARSAL Right 2021    Procedure: AMPUTATION TRANSMETATARSAL (TMA);  Surgeon: Duke Garcia DPM;  [facility]: [facility];  Service: Podiatry    DC AMPUTATION TOE,MT-P JT Right 2020    Procedure: AMPUTATION TOE- 5th;  Surgeon: Dia Sagastume DPM;  Location: [facility];  Service: Podiatry    GA COLONOSCOPY FLX DX W/COLLJ [address] PFRMD N/A 3/13/2019    Procedure: COLONOSCOPY;  Surgeon: David Hollingsworth MD;  Location: [facility]; Service: Gastroenterology    GA ESOPHAGOGASTRODUODENOSCOPY TRANSORAL DIAGNOSTIC N/A 3/13/2019    Procedure: ESOPHAGOGASTRODUODENOSCOPY (EGD); Surgeon: David Hollingsworth MD;  Location: [facility];   Service: Gastroenterology    GA LAPAROSCOPY W TOT HYSTERECT UTERUS 250 GRAM OR LESS N/A 2/16/2016    Procedure: HYSTERECTOMY LAPAROSCOPIC TOTAL [facility]), with bilateral salpingectomy;  Surgeon: Shital Leslie DO;  [facility]: [facility];  Service: Gynecology    THROMBECTOMY / ARTERIOVENOUS GRAFT REVISION      TOE SURGERY Right     removal of the 4th toe    WOUND DEBRIDEMENT Right 10/8/2021    Procedure: R 1st MTPJ washout ;  Surgeon: Ezekiel Fitzgerald DPM;  [facility];  Service: Podiatry       ALLERGIES:  Allergies   Allergen Reactions    Iodinated Diagnostic Agents Itching    Keflex [Cephalexin] Hives    Eggs Or Egg-Derived Products - Food Allergy Rash    Wound Dressing Adhesive Rash       SOCIAL HISTORY:  Social History     Substance and Sexual Activity   Alcohol Use Never    Alcohol/week: 0 0 standard drinks     Social History     Substance and Sexual Activity   Drug Use No     Social History     Tobacco Use   Smoking Status Never Smoker   Smokeless Tobacco Never Used       FAMILY HISTORY:  Family History   Problem Relation Age of Onset    Heart disease Family     Diabetes Family     Heart disease Mother     Cancer Brother         neck    Throat cancer Brother     Ovarian cancer Maternal Aunt 40       MEDICATIONS:    Current Facility-Administered Medications:     lidocaine (LIDODERM) 5 % patch 1 patch, 1 patch, Topical, Once, Bill Calvo MD, 1 patch at 07/29/22 1333    Current Outpatient Medications:     Accu-Chek Guide test strip, use to TEST BLOOD SUGAR two to three times a day, Disp: , Rfl:     Accu-Chek Softclix Lancets lancets, 3 (three) times a day Use to test blood sugar, Disp: , Rfl:     albuterol (ProAir HFA) 90 mcg/act inhaler, Inhale 2 puffs every 6 (six) hours as needed for wheezing or shortness of breath, Disp: 8 5 g, Rfl: 0    ALPRAZolam (XANAX) 0 25 mg tablet, Take 0 25 mg by mouth 2 (two) times a day as needed for anxiety, Disp: , Rfl:     ALPRAZolam (XANAX) 0 5 mg tablet, TAKE 2 AND 1/2 TABLETS DAILY IN DIVIDED DOSES AS DIRECTED , Disp: , Rfl:     ammonium lactate (LAC-HYDRIN) 12 % cream, Apply topically 2 (two) times a day, Disp: 385 g, Rfl: 0    atorvastatin (LIPITOR) 20 mg tablet, Take 20 mg by mouth every evening , Disp: , Rfl: 0    B Complex-C-Folic Acid (Natalia-Denys) TABS, TAKE ONE TABLET BY MOUTH DAILY, Disp: 90 tablet, Rfl: 3    carvedilol (COREG) 25 mg tablet, TAKE ONE TABLET BY MOUTH TWICE A DAY, Disp: 180 tablet, Rfl: 7    cinacalcet (SENSIPAR) 30 mg tablet, Take 30 mg by mouth daily, Disp: , Rfl:     dicyclomine (BENTYL) 10 mg capsule, TAKE 1 TABLET BY MOUTH EVERY 6 HOURS OR AS NEEDED FOR PAIN, Disp: , Rfl:     gabapentin (NEURONTIN) 100 mg capsule, , Disp: , Rfl:     insulin glargine (LANTUS) 100 units/mL subcutaneous injection, Inject 12 Units under the skin daily at bedtime, Disp: 10 mL, Rfl: 0    KIONEX 15 GM/60ML suspension, Take by mouth Takes as a shake on dialysis days Tues-Thurs_Sat , Disp: , Rfl: 0    levothyroxine 50 mcg tablet, Take 50 mcg by mouth daily, Disp: , Rfl:     lidocaine (LIDODERM) 5 %, , Disp: , Rfl:     loratadine (CLARITIN) 10 mg tablet, Take 10 mg by mouth daily, Disp: , Rfl:     melatonin 3 mg, Take 1 tablet (3 mg total) by mouth daily at bedtime, Disp: 30 tablet, Rfl: 0    NIFEdipine (ADALAT CC) 30 MG 24 hr tablet, Take 1 tablet (30 mg total) by mouth daily, Disp: 30 tablet, Rfl: 0    NOVOLOG FLEXPEN 100 units/mL SOPN, INJECT 1 TO 5 UNITS SUBCUTANEOUSLY THREE TIMES A DAY BEFORE MELAS AS PER SLIDING SCALE, Disp: , Rfl:     nystatin (MYCOSTATIN) powder, Apply topically 2 (two) times a day, Disp: 15 g, Rfl: 0    ondansetron (ZOFRAN-ODT) 8 mg disintegrating tablet, , Disp: , Rfl:     pantoprazole (PROTONIX) 40 mg tablet, take 1 tablet by mouth twice a day, Disp: , Rfl:     polyethylene glycol (MIRALAX) 17 g packet, Take 17 g by mouth daily for 7 days, Disp: 119 g, Rfl: 0    polyethylene glycol (MIRALAX) 17 g packet, Take 17 g by mouth daily, Disp: 510 g, Rfl: 5    pregabalin (LYRICA) 50 mg capsule, Take 1 PO daily, take 1 PO additionally directly have HD on HD days, Disp: 45 capsule, Rfl: 2    senna (SENOKOT) 8 6 mg, Take 2 tablets (17 2 mg total) by mouth daily at bedtime as needed for constipation, Disp: 30 each, Rfl: 0    sertraline (ZOLOFT) 50 mg tablet, Take 1 tablet (50 mg total) by mouth daily (Patient taking differently: Take 75 mg by mouth in the morning ), Disp: 30 tablet, Rfl: 0    sevelamer (RENAGEL) 800 mg tablet, Take 2 tablets (1,600 mg total) by mouth 3 (three) times a day with meals, Disp: 30 tablet, Rfl: 0    Sevelamer Carbonate 2 4 g PACK, STIR 1 PACKET INTO FOOD 3 TIMES A DAY WITH MEALS, Disp: , Rfl:     torsemide (DEMADEX) 100 mg tablet, Take 1 tablet (100 mg total) by mouth 4 (four) times a week On Sunday, Monday, Wednesday, Friday (Patient taking differently: Take 100 mg by mouth daily On Sunday, Monday, Wednesday, Friday (pt states she takes it everyday)), Disp: , Rfl: 0    urea (CARMOL) 40 %, APPLY TO AFFECTED AREA TOPICALLY EVERY DAY, Disp: 85 g, Rfl: 2    warfarin (COUMADIN) 3 mg tablet, Take 3 tablets (9 mg total) by mouth daily Takes 9 mg Monday Sat, Disp: 10 tablet, Rfl: 0    REVIEW OF SYSTEMS:  Constitutional: Negative for fatigue, anorexia, fever, chills, diaphoresis  HENT: Negative for postnasal drip  Eyes: Negative for visual disturbance  Respiratory: Negative for cough, shortness of breath and wheezing  Cardiovascular: Negative for chest pain, palpitations and leg swelling  Gastrointestinal: Negative for abdominal pain, constipation, diarrhea, nausea and vomiting  Genitourinary: No dysuria, hematuria  Endocrine: Negative for polyuria  Musculoskeletal: Negative for arthralgias, and joint swelling  +++ Back Pain  Skin: Negative for rash  Neurological: Negative for focal weakness, headaches, dizziness  Hematological: Negative for easy bruising or bleeding  Psychiatric/Behavioral: Negative for confusion and sleep disturbance  All the systems were reviewed and were negative except as documented on the HPI  PHYSICAL EXAM:  Current Weight:    First Weight:    Vitals:    07/29/22 1115   BP: 153/89   Pulse: 80   Resp: 16   Temp: 97 8 °F (36 6 °C)   SpO2: 98%     No intake or output data in the 24 hours ending 07/29/22 1456  Physical Exam  Vitals and nursing note reviewed  Constitutional:       General: She is not in acute distress  Appearance: She is well-developed  HENT:      Head: Normocephalic and atraumatic  Eyes:      General: No scleral icterus  Conjunctiva/sclera: Conjunctivae normal       Pupils: Pupils are equal, round, and reactive to light  Comments: Left Eye Redness   Cardiovascular:      Rate and Rhythm: Normal rate and regular rhythm  Heart sounds: S1 normal and S2 normal  No murmur heard  No friction rub  No gallop  Pulmonary:      Effort: Pulmonary effort is normal  No respiratory distress  Breath sounds: Normal breath sounds  No wheezing or rales  Abdominal:      General: Bowel sounds are normal       Palpations: Abdomen is soft  Tenderness: There is no abdominal tenderness  There is no rebound  Musculoskeletal:         General: Normal range of motion  Cervical back: Normal range of motion and neck supple  Skin:     Findings: No rash  Neurological:      Mental Status: She is alert and oriented to person, place, and time     Psychiatric:         Behavior: Behavior normal            Invasive Devices:      Lab Results:   Results from last 7 days   Lab Units 07/29/22  1218 07/24/22  2044   WBC Thousand/uL 4 67 5 43   HEMOGLOBIN g/dL 7 5* 7 7*   HEMATOCRIT % 23 8* 24 4*   PLATELETS Thousands/uL 114* 161   POTASSIUM mmol/L 5 3 5 8*   CHLORIDE mmol/L 105 99   CO2 mmol/L 30 31   BUN mg/dL 47* 53*   CREATININE mg/dL 6 83* 8 36*   CALCIUM mg/dL 8 6 8 5

## 2022-07-29 NOTE — TELEPHONE ENCOUNTER
S/w the patient and reviewed the previous office note  She stated her pain in her pelvis is off the wall  She can hardly get OOB  She is rating her pain above a "10"  Reviewed that we are awaiting neurosurgery's input to make sure that she does not have a current infection  I stated at this point if her pain isoff the charts then I would recommend going to the ED to be evaluated for an infection  She stated she feels like she is getting the run around  I then inquired if she is currently running a fever and she stated no  I stated the only indicators for knowing if she is having an infection is to have her blood work and cultures drawn and that can be tested at the ED  She stated the ED does nothing for her then I stated contact neuro and see if she could move her appt up? She stated the pain is so bad she is waiting for her BF to take her to the ED  I stated I would let JW be aware and if anything different we would notify her back

## 2022-07-29 NOTE — CONSULTS
[id number] 1965, 56 y o  female MRN: [id number]  Unit/Bed#: [facility] 431-34 Encounter: [id number]  Primary Care Provider: Nehal Butler MD   Date and time admitted to hospital: 7/29/2022 11:10 AM    Consult was completed on 07/29/2022 at 3:10 p m  Inpatient consult to Neurosurgery  Consult performed by: LOIS Lopez  Consult ordered by: Barb Powell MD          * Intractable back pain  Assessment & Plan  Patient complaining of severe right-sided low back pain with bilateral lower extremity radiculopathy right more than left  · Prior MRI demonstrates moderately advanced degenerative lumbar spine disease at multi levels  Also L4-5 disc herniation with moderate central canal stenosis  · Prior x-rays also demonstrated anteriorlisthesis at L4-5 level  · Doubt clinical infection in lumbar spine  · Patient was recently hospitalized in early July after a fall she was also found to be COVID positive  There was some concern for L4-5 osteomyelitis/diskitis  · Also history of right foot diabetic ulcer, osteomyelitis/MRSA infection with prior treatment with IV antibiotics  Also status post right foot amputation in 2021  Imaging:    MRI lumbar spine wo 7/12/22:  Marrow edema about the L4-5 intervertebral disc space with high signal in the intervertebral disc space, associated spondylotic narrowing secondary to disc herniation disc and bilateral facet effusions  Findings may be related to type I Modic   endplate changes and spondylosis  However, differential diagnosis includes early discitis/osteomyelitis  Further clinical assessment with laboratory values and follow-up advised  Bilateral renal atrophy with numerous small cysts correlating the history of end-stage renal disease  Lumbar spine x-ray 7/13/22:Anterolisthesis at L4-L5, this is not seen on prior CT or MRI    This may be in part due to patient being imaged in the upright position on this exam  However, given concern for discitis/osteomyelitis further evaluation with MRI of the lumbar spine using intravenous contrast is recommended  Plan:  · Exam: R HF weakness 3-4-/5 2/2 pain   · Reviewed prior images with patient and significant other  · Recommend repeat MRI lumbar spine w/wo  · Discussed with Regan Beebe with Nephrology  Patient needs to have hemodialysis after MRI  Discussed with team they will help to arrange for timing for MRI to be done  Internal medicine and MRI department also aware  · Also ordered CT lumbar spine wo to further assess pedicles and facets  · Nephrology following, input appreciated  · Spoke with both nephrology and MRI team MRI likely will be done late Sunday early Monday morning in conjunction with dialysis  · Medical management and pain control per primary team  · CRP 20 8 down from 23 9  · ESR 24 down from 26  · BCs x2 from 7/29 pending  · Patient can wear LSO brace p r n  for comfort  · DVT PPX: SCDs and heparin    Neurosurgery will follow through the periphery over the weekend until imaging completed, call with any further questions or concerns      History of Present Illness     HPI: Stanislav Marie is a 56 y o   female with PMH significant for diabetes, end-stage kidney disease on hemodialysis, hypertension, cardiomyopathy, chronic pain syndrome, history of DVT and PE on Coumadin, history of right diabetic ulcer/osteomyelitis previously treated with IV antibiotics now status post trans metatarsal right foot amputation 2021  Patient presented to the ED today with severe low back pain  Patient reports since her discharge from the hospital her back pain has continued to worsen  Patient states she presented to the ED a few days ago on 07/24 with back pain  She states she recently saw pain management on 07/27 and nothing has been helping  She states she did receive an injection in her back then but it only helped for 2-3 days    Pain management wanted to see our recommendations before pursuing anything else  Patient denies any recent falls or traumas  She is currently complaining of severe right lower back pain which she rates 10/10 which is constant and radiates down her right anterior/lateral leg stopping at her foot  She reports this occasionally radiates down her left anterior thigh  Patient states getting out of bed or sitting down worsens her pain  She states she is on dialysis she can not take many pain medication but at times does take Motrin even though she is not supposed to  She also endorses numbness in her right leg as well as constipation  She denies any radiation into her groin or bowel or bladder incontinence  She also endorses ongoing occasional headaches and blurry vision as well as shortness of breath  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  She denies any dizziness, chest pain, abdominal pain, nausea, vomiting, diarrhea, no problems with bowel or bladder, no saddle anesthesia, no new weakness or numbness/tingling  Patient was recently admitted to the hospital on 07/09/2022 status post fall with low back pain  Imaging demonstrated suspicion for potential osteomyelitis/diskitis at L4-5  Workup was done and Neurosurgery and had low concern for osteomyelitis and diskitis and patient was scheduled for follow-up with repeat imaging on August 11th  Review of Systems   Constitutional: Negative for activity change, chills and fever  HENT: Negative for tinnitus and trouble swallowing  Eyes: Positive for visual disturbance  Respiratory: Positive for shortness of breath  Cardiovascular: Negative for chest pain  Gastrointestinal: Positive for constipation  Negative for abdominal pain, diarrhea, nausea and vomiting  Genitourinary: Negative for difficulty urinating  Musculoskeletal: Positive for back pain  Negative for gait problem  Neurological: Positive for weakness and headaches   Negative for dizziness, speech difficulty, light-headedness and numbness         Historical Information   Past Medical History:   Diagnosis Date    Abnormal uterine bleeding (AUB)     Anemia     Anxiety     Arthritis     Chronic kidney disease     Claustrophobia     Diabetes mellitus ([place] [id number] )     Disease of thyroid gland     DVT (deep venous thrombosis) (HCC)     End stage kidney disease (HCC)     Foot ulcer due to secondary DM ([place] [address] )     Hemodialysis patient ([place] [address] )     Tues, Thurs, Sat    Hypertension     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[place] [address] )     right eye    Morbid obesity ([place] [address] )     Osteomyelitis of fifth toe of right foot (HCC)     Panic attacks     Pulmonary embolism (HCC)     Reflux esophagitis     Thrombophlebitis of saphenous vein     Warfarin anticoagulation      Past Surgical History:   Procedure Laterality Date    AMPUTATION      r 4th toe    ARTERIOVENOUS GRAFT PLACEMENT      CATARACT EXTRACTION Bilateral     CERVICAL BIOPSY  W/ LOOP ELECTRODE EXCISION       SECTION      DIALYSIS FISTULA CREATION      DILATION AND CURETTAGE OF UTERUS      ENDOMETRIAL ABLATION W/ NOVASURE      EYE SURGERY      HYSTERECTOMY      @ age 57 (complete)    IR AV FISTULAGRAM/GRAFTOGRAM  10/11/2019    IR AV FISTULAGRAM/GRAFTOGRAM  2020    IR AV FISTULAGRAM/GRAFTOGRAM  2020    IR NON-TUNNELED CENTRAL LINE PLACEMENT  2021    IR NON-TUNNELED CENTRAL LINE PLACEMENT  10/4/2021    OOPHORECTOMY Bilateral     @ age 57    PERICARDIUM SURGERY      CA AMPUTATION FOOT,TRANSMETATARSAL Right 2021    Procedure: AMPUTATION TRANSMETATARSAL (TMA);  Surgeon: Madison Gan DPM;  [facility];  Service: Podiatry    CA AMPUTATION TOE,MT-P JT Right 2020    Procedure: AMPUTATION TOE- 5th;  Surgeon: Madison Gan DPM;  [facility];  Service: Podiatry    CA COLONOSCOPY FLX DX W/COLLJ SPEC WHEN PFRMD N/A 3/13/2019    Procedure: COLONOSCOPY;  Surgeon: Lee Gomez MD; Location: [facility]; Service: Gastroenterology    MN ESOPHAGOGASTRODUODENOSCOPY TRANSORAL DIAGNOSTIC N/A 3/13/2019    Procedure: ESOPHAGOGASTRODUODENOSCOPY (EGD); Surgeon: Lee Gomez MD;  [facility];   Service: Gastroenterology    MN LAPAROSCOPY W TOT HYSTERECT UTERUS 250 GRAM OR LESS N/A 2/16/2016    Procedure: HYSTERECTOMY LAPAROSCOPIC TOTAL ([address]), with bilateral salpingectomy;  Surgeon: Brian Castillo DO;  [facility];  Service: Gynecology    THROMBECTOMY / ARTERIOVENOUS GRAFT REVISION      TOE SURGERY Right     removal of the 4th toe    WOUND DEBRIDEMENT Right 10/8/2021    Procedure: R 1st MTPJ washout ;  Surgeon: Tri Tai DPM;  [facility];  Service: Podiatry     Social History     Substance and Sexual Activity   Alcohol Use Never    Alcohol/week: 0 0 standard drinks     Social History     Substance and Sexual Activity   Drug Use No     Social History     Tobacco Use   Smoking Status Never Smoker   Smokeless Tobacco Never Used     Family History   Problem Relation Age of Onset    Heart disease Family     Diabetes Family     Heart disease Mother     Cancer Brother         neck    Throat cancer Brother     Ovarian cancer Maternal Aunt 40       Meds/Allergies   all current active meds have been reviewed, current meds:   Current Facility-Administered Medications   Medication Dose Route Frequency    acetaminophen (TYLENOL) tablet 650 mg  650 mg Oral Q6H PRN    albuterol (PROVENTIL HFA,VENTOLIN HFA) inhaler 2 puff  2 puff Inhalation Q6H PRN    ALPRAZolam (XANAX) tablet 0 25 mg  0 25 mg Oral BID PRN    atorvastatin (LIPITOR) tablet 20 mg  20 mg Oral QPM    b complex-vitamin C-folic acid (NEPHROCAPS) capsule 1 capsule  1 capsule Oral Daily With Dinner    carvedilol (COREG) tablet 25 mg  25 mg Oral BID    [START ON 7/30/2022] cinacalcet (SENSIPAR) tablet 30 mg  30 mg Oral Daily    dicyclomine (BENTYL) capsule 10 mg  10 mg Oral 4x Daily (AC & HS)    [START ON 7/30/2022] gabapentin (NEURONTIN) capsule 100 mg  100 mg Oral Daily    heparin (porcine) subcutaneous injection 5,000 Units  5,000 Units Subcutaneous Q8H [facility]    insulin glargine (LANTUS) subcutaneous injection 20 Units 0 2 mL  20 Units Subcutaneous HS    insulin lispro (HumaLOG) 100 units/mL subcutaneous injection 1-5 Units  1-5 Units Subcutaneous TID AC    insulin lispro (HumaLOG) 100 units/mL subcutaneous injection 1-5 Units  1-5 Units Subcutaneous HS    insulin lispro (HumaLOG) 100 units/mL subcutaneous injection 5 Units  5 Units Subcutaneous TID With Meals    [START ON 7/30/2022] levothyroxine tablet 50 mcg  50 mcg Oral Daily    lidocaine (LIDODERM) 5 % patch 1 patch  1 patch Topical Once    [START ON 7/30/2022] lidocaine (LIDODERM) 5 % patch 1 patch  1 patch Topical Daily    [START ON 7/30/2022] loratadine (CLARITIN) tablet 10 mg  10 mg Oral Daily    melatonin tablet 3 mg  3 mg Oral HS    [START ON 7/30/2022] NIFEdipine (PROCARDIA XL) 24 hr tablet 30 mg  30 mg Oral Daily    nystatin (MYCOSTATIN) powder   Topical BID    ondansetron (ZOFRAN) injection 4 mg  4 mg Intravenous Q4H PRN    oxyCODONE (ROXICODONE) immediate release tablet 10 mg  10 mg Oral Q4H PRN    oxyCODONE (ROXICODONE) IR tablet 5 mg  5 mg Oral Q4H PRN    [START ON 7/30/2022] pantoprazole (PROTONIX) EC tablet 40 mg  40 mg Oral Early Morning    [START ON 7/30/2022] polyethylene glycol (MIRALAX) packet 17 g  17 g Oral Daily    [START ON 7/30/2022] pregabalin (LYRICA) capsule 50 mg  50 mg Oral Daily    senna (SENOKOT) tablet 17 2 mg  2 tablet Oral HS PRN    [START ON 7/30/2022] sertraline (ZOLOFT) tablet 75 mg  75 mg Oral Daily    sevelamer (RENAGEL) tablet 1,600 mg  1,600 mg Oral TID With Meals    [START ON 7/30/2022] torsemide (DEMADEX) tablet 100 mg  100 mg Oral Daily    warfarin (COUMADIN) tablet 9 mg  9 mg Oral Daily (warfarin)    and PTA meds:   Prior to Admission Medications   Prescriptions Last Dose Informant Patient Reported? Taking? ALPRAZolam (XANAX) 0 25 mg tablet  Self Yes No   Sig: Take 0 25 mg by mouth 2 (two) times a day as needed for anxiety   ALPRAZolam (XANAX) 0 5 mg tablet   Yes No   Sig: TAKE 2 AND 1/2 TABLETS DAILY IN DIVIDED DOSES AS DIRECTED     Accu-Chek Guide test strip   Yes No   Sig: use to TEST BLOOD SUGAR two to three times a day   Accu-Chek Softclix Lancets lancets  Self Yes No   Sig: 3 (three) times a day Use to test blood sugar   B Complex-C-Folic Acid (Natalia-Denys) TABS   No No   Sig: TAKE ONE TABLET BY MOUTH DAILY   KIONEX 15 GM/60ML suspension  Self Yes No   Sig: Take by mouth Takes as a shake on dialysis days Tues-Thurs_Sat    NIFEdipine (ADALAT CC) 30 MG 24 hr tablet   No No   Sig: Take 1 tablet (30 mg total) by mouth daily   NOVOLOG FLEXPEN 100 units/mL SOPN  Self Yes No   Sig: INJECT 1 TO 5 UNITS SUBCUTANEOUSLY THREE TIMES A DAY BEFORE MELAS AS PER SLIDING SCALE   Sevelamer Carbonate 2 4 g PACK   Yes No   Sig: STIR 1 PACKET INTO FOOD 3 TIMES A DAY WITH MEALS   albuterol (ProAir HFA) 90 mcg/act inhaler   No No   Sig: Inhale 2 puffs every 6 (six) hours as needed for wheezing or shortness of breath   ammonium lactate (LAC-HYDRIN) 12 % cream   No No   Sig: Apply topically 2 (two) times a day   atorvastatin (LIPITOR) 20 mg tablet  Self Yes No   Sig: Take 20 mg by mouth every evening    carvedilol (COREG) 25 mg tablet   No No   Sig: TAKE ONE TABLET BY MOUTH TWICE A DAY   cinacalcet (SENSIPAR) 30 mg tablet  Self Yes No   Sig: Take 30 mg by mouth daily   dicyclomine (BENTYL) 10 mg capsule   Yes No   Sig: TAKE 1 TABLET BY MOUTH EVERY 6 HOURS OR AS NEEDED FOR PAIN   gabapentin (NEURONTIN) 100 mg capsule   Yes No   insulin glargine (LANTUS) 100 units/mL subcutaneous injection   No No   Sig: Inject 12 Units under the skin daily at bedtime   levothyroxine 50 mcg tablet  Self Yes No   Sig: Take 50 mcg by mouth daily   lidocaine (LIDODERM) 5 %   Yes No   loratadine (CLARITIN) 10 mg tablet  Self Yes No   Sig: Take 10 mg by mouth daily   melatonin 3 mg   No No   Sig: Take 1 tablet (3 mg total) by mouth daily at bedtime   nystatin (MYCOSTATIN) powder  Self No No   Sig: Apply topically 2 (two) times a day   ondansetron (ZOFRAN-ODT) 8 mg disintegrating tablet   Yes No   pantoprazole (PROTONIX) 40 mg tablet   Yes No   Sig: take 1 tablet by mouth twice a day   polyethylene glycol (MIRALAX) 17 g packet   No No   Sig: Take 17 g by mouth daily for 7 days   polyethylene glycol (MIRALAX) 17 g packet   No No   Sig: Take 17 g by mouth daily   pregabalin (LYRICA) 50 mg capsule   No No   Sig: Take 1 PO daily, take 1 PO additionally directly have HD on HD days   senna (SENOKOT) 8 6 mg  Self No No   Sig: Take 2 tablets (17 2 mg total) by mouth daily at bedtime as needed for constipation   sertraline (ZOLOFT) 50 mg tablet  Self No No   Sig: Take 1 tablet (50 mg total) by mouth daily   Patient taking differently: Take 75 mg by mouth in the morning  sevelamer (RENAGEL) 800 mg tablet   No No   Sig: Take 2 tablets (1,600 mg total) by mouth 3 (three) times a day with meals   torsemide (DEMADEX) 100 mg tablet   No No   Sig: Take 1 tablet (100 mg total) by mouth 4 (four) times a week On Sunday, Monday, Wednesday, Friday   Patient taking differently: Take 100 mg by mouth daily On Sunday, Monday, Wednesday, Friday (pt states she takes it everyday)   urea (CARMOL) 40 %   No No   Sig: APPLY TO AFFECTED AREA TOPICALLY EVERY DAY   warfarin (COUMADIN) 3 mg tablet  Self No No   Sig: Take 3 tablets (9 mg total) by mouth daily Takes 9 mg Monday Sat      Facility-Administered Medications: None     Allergies   Allergen Reactions    Iodinated Diagnostic Agents Itching    Keflex [Cephalexin] Hives    Eggs Or Egg-Derived Products - Food Allergy Rash    Wound Dressing Adhesive Rash       Objective   I/O     None          Physical Exam  Vitals reviewed  Constitutional:       General: She is awake  She is not in acute distress       Appearance: Normal appearance  She is not ill-appearing  HENT:      Head: Normocephalic and atraumatic  Eyes:      Extraocular Movements: Extraocular movements intact and EOM normal       Conjunctiva/sclera: Conjunctivae normal       Pupils: Pupils are equal, round, and reactive to light  Cardiovascular:      Rate and Rhythm: Normal rate  Pulmonary:      Effort: Pulmonary effort is normal  No respiratory distress  Chest:      Chest wall: No tenderness  Abdominal:      General: There is no distension  Palpations: Abdomen is soft  Tenderness: There is no abdominal tenderness  Musculoskeletal:         General: Normal range of motion  Cervical back: Normal range of motion and neck supple  Thoracic back: Tenderness present  Lumbar back: Tenderness present  Skin:     General: Skin is warm and dry  Neurological:      Mental Status: She is alert and oriented to person, place, and time  Coordination: Finger-Nose-Finger Test normal       Deep Tendon Reflexes:      Reflex Scores:       Bicep reflexes are 1+ on the right side and 1+ on the left side  Patellar reflexes are 1+ on the right side and 1+ on the left side  Psychiatric:         Attention and Perception: Attention and perception normal          Mood and Affect: Mood and affect normal          Speech: Speech normal          Behavior: Behavior normal  Behavior is cooperative  Thought Content: Thought content normal          Cognition and Memory: Cognition and memory normal          Judgment: Judgment normal        Neurologic Exam     Mental Status   Oriented to person, place, and time  Follows 2 step commands  Attention: normal  Concentration: normal    Speech: speech is normal   Level of consciousness: alert  Knowledge: good  Able to perform simple calculations  Able to name object  Able to repeat  Normal comprehension  Cranial Nerves     CN III, IV, VI   Pupils are equal, round, and reactive to light    Extraocular motions are normal    CN III: no CN III palsy  CN VI: no CN VI palsy  Nystagmus: none   Diplopia: none  Conjugate gaze: present    CN V   Facial sensation intact  CN VII   Facial expression full, symmetric  CN VIII   CN VIII normal    Hearing: intact    CN IX, X   CN IX normal      CN XI   CN XI normal      CN XII   CN XII normal      Motor Exam   Muscle bulk: normal  Overall muscle tone: normal  Right arm pronator drift: absent  Left arm pronator drift: absent    Strength   Strength 5/5 except as noted  R HF 3-4-/5     Sensory Exam   Right arm light touch: normal  Left arm light touch: normal  Left leg light touch: normal  Proprioception normal    Dullness through out entire right extremity     Gait, Coordination, and Reflexes     Coordination   Finger to nose coordination: normal    Tremor   Resting tremor: absent  Intention tremor: absent  Action tremor: absent    Reflexes   Right biceps: 1+  Left biceps: 1+  Right patellar: 1+  Left patellar: 1+  Right Damon: absent  Left Damon: absent  Right ankle clonus: absent  Left ankle clonus: absent      Vitals:Blood pressure 95/54, pulse 73, temperature 98 °F (36 7 °C), resp  rate 16, last menstrual period 02/12/2016, SpO2 92 %, not currently breastfeeding  ,There is no height or weight on file to calculate BMI       Lab Results:   Results from last 7 days   Lab Units 07/29/22  1218 07/24/22  2044   WBC Thousand/uL 4 67 5 43   HEMOGLOBIN g/dL 7 5* 7 7*   HEMATOCRIT % 23 8* 24 4*   PLATELETS Thousands/uL 114* 161   NEUTROS PCT % 68 63   MONOS PCT % 12 13*     Results from last 7 days   Lab Units 07/29/22  1218 07/24/22  2044   POTASSIUM mmol/L 5 3 5 8*   CHLORIDE mmol/L 105 99   CO2 mmol/L 30 31   BUN mg/dL 47* 53*   CREATININE mg/dL 6 83* 8 36*   CALCIUM mg/dL 8 6 8 5             Results from last 7 days   Lab Units 07/29/22  1218   INR  1 99*     No results found for: TROPONINT  ABG:No results found for: PHART, [id number], PO2ART, ZQL8TYM, [id number], BEART, SOURCE    Imaging Studies: I have personally reviewed pertinent reports  and I have personally reviewed pertinent films in PACS    EKG, Pathology, and Other Studies: I have personally reviewed pertinent reports        VTE Prophylaxis: Sequential compression device [place])     Code Status: Level 1 - Full Code  Advance Directive and Living Will:      Power of :    POLST:

## 2022-07-29 NOTE — ED PROVIDER NOTES
History  Chief Complaint   Patient presents with    Back Pain     Low back pain with recurrent visits for same     53-year-old female patient with history of ESRD dialysis Tuesday, Thursday Saturday, chronic back pain, diabetes, DVT on Coumadin presenting with acute on chronic right lower back pain onset 3 weeks ago  Patient states pain has been worsening  Patient states she has difficulty walking secondary to pain  Patient states that she had a temperature of 100 2° couple days ago  Denies chest pain, shortness of breath, abdominal pain, nausea, vomiting, diarrhea, constipation, urinary retention, incontinence  Patient states that she has follow-up with Neurosurgery on 08/11 but pain has been worsening and ibuprofen at home has not been helping her  States of right lower back pain radiates down to her right leg  Prior to Admission Medications   Prescriptions Last Dose Informant Patient Reported? Taking? ALPRAZolam (XANAX) 0 25 mg tablet  Self Yes No   Sig: Take 0 25 mg by mouth 2 (two) times a day as needed for anxiety   ALPRAZolam (XANAX) 0 5 mg tablet   Yes No   Sig: TAKE 2 AND 1/2 TABLETS DAILY IN DIVIDED DOSES AS DIRECTED     Accu-Chek Guide test strip   Yes No   Sig: use to TEST BLOOD SUGAR two to three times a day   Accu-Chek Softclix Lancets lancets  Self Yes No   Sig: 3 (three) times a day Use to test blood sugar   B Complex-C-Folic Acid (Natalia-Denys) TABS   No No   Sig: TAKE ONE TABLET BY MOUTH DAILY   KIONEX 15 GM/60ML suspension  Self Yes No   Sig: Take by mouth Takes as a shake on dialysis days Tues-Thurs_Sat    NIFEdipine (ADALAT CC) 30 MG 24 hr tablet   No No   Sig: Take 1 tablet (30 mg total) by mouth daily   NOVOLOG FLEXPEN 100 units/mL SOPN  Self Yes No   Sig: INJECT 1 TO 5 UNITS SUBCUTANEOUSLY THREE TIMES A DAY BEFORE MELAS AS PER SLIDING SCALE   Sevelamer Carbonate 2 4 g PACK   Yes No   Sig: STIR 1 PACKET INTO FOOD 3 TIMES A DAY WITH MEALS   albuterol (ProAir HFA) 90 mcg/act inhaler   No No   Sig: Inhale 2 puffs every 6 (six) hours as needed for wheezing or shortness of breath   ammonium lactate (LAC-HYDRIN) 12 % cream   No No   Sig: Apply topically 2 (two) times a day   atorvastatin (LIPITOR) 20 mg tablet  Self Yes No   Sig: Take 20 mg by mouth every evening    carvedilol (COREG) 25 mg tablet   No No   Sig: TAKE ONE TABLET BY MOUTH TWICE A DAY   cinacalcet (SENSIPAR) 30 mg tablet  Self Yes No   Sig: Take 30 mg by mouth daily   dicyclomine (BENTYL) 10 mg capsule   Yes No   Sig: TAKE 1 TABLET BY MOUTH EVERY 6 HOURS OR AS NEEDED FOR PAIN   gabapentin (NEURONTIN) 100 mg capsule   Yes No   insulin glargine (LANTUS) 100 units/mL subcutaneous injection   No No   Sig: Inject 12 Units under the skin daily at bedtime   levothyroxine 50 mcg tablet  Self Yes No   Sig: Take 50 mcg by mouth daily   lidocaine (LIDODERM) 5 %   Yes No   loratadine (CLARITIN) 10 mg tablet  Self Yes No   Sig: Take 10 mg by mouth daily   melatonin 3 mg   No No   Sig: Take 1 tablet (3 mg total) by mouth daily at bedtime   nystatin (MYCOSTATIN) powder  Self No No   Sig: Apply topically 2 (two) times a day   ondansetron (ZOFRAN-ODT) 8 mg disintegrating tablet   Yes No   pantoprazole (PROTONIX) 40 mg tablet   Yes No   Sig: take 1 tablet by mouth twice a day   polyethylene glycol (MIRALAX) 17 g packet   No No   Sig: Take 17 g by mouth daily for 7 days   polyethylene glycol (MIRALAX) 17 g packet   No No   Sig: Take 17 g by mouth daily   pregabalin (LYRICA) 50 mg capsule   No No   Sig: Take 1 PO daily, take 1 PO additionally directly have HD on HD days   senna (SENOKOT) 8 6 mg  Self No No   Sig: Take 2 tablets (17 2 mg total) by mouth daily at bedtime as needed for constipation   sertraline (ZOLOFT) 50 mg tablet  Self No No   Sig: Take 1 tablet (50 mg total) by mouth daily   Patient taking differently: Take 75 mg by mouth in the morning     sevelamer (RENAGEL) 800 mg tablet   No No   Sig: Take 2 tablets (1,600 mg total) by mouth 3 (three) times a day with meals   torsemide (DEMADEX) 100 mg tablet   No No   Sig: Take 1 tablet (100 mg total) by mouth 4 (four) times a week On , Monday, Wednesday, Friday   Patient taking differently: Take 100 mg by mouth daily On , Monday, Wednesday, Friday (pt states she takes it everyday)   urea (CARMOL) 40 %   No No   Sig: APPLY TO AFFECTED AREA TOPICALLY EVERY DAY   warfarin (COUMADIN) 3 mg tablet  Self No No   Sig: Take 3 tablets (9 mg total) by mouth daily Takes 9 mg Monday Sat      Facility-Administered Medications: None       Past Medical History:   Diagnosis Date    Abnormal uterine bleeding (AUB)     Anemia     Anxiety     Arthritis     Chronic kidney disease     Claustrophobia     Diabetes mellitus [facility]     Disease of thyroid gland     DVT (deep venous thrombosis) (HCC)     End stage kidney disease ([facility]     Foot ulcer due to secondary DM ([facility]     Hemodialysis patient ([facility] )     Tues, Thurs, Sat    Hypertension     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[facility] )     right eye    Morbid obesity ([facility]     Osteomyelitis of fifth toe of right foot ([facility]     Panic attacks     Pulmonary embolism (HCC)     Reflux esophagitis     Thrombophlebitis of saphenous vein     Warfarin anticoagulation        Past Surgical History:   Procedure Laterality Date    AMPUTATION      r 4th toe    ARTERIOVENOUS GRAFT PLACEMENT      CATARACT EXTRACTION Bilateral     CERVICAL BIOPSY  W/ LOOP ELECTRODE EXCISION       SECTION      DIALYSIS FISTULA CREATION      DILATION AND CURETTAGE OF UTERUS      ENDOMETRIAL ABLATION W/ NOVASURE      EYE SURGERY      HYSTERECTOMY      @ age 57 (complete)    IR AV FISTULAGRAM/GRAFTOGRAM  10/11/2019    IR AV FISTULAGRAM/GRAFTOGRAM  2020    IR AV FISTULAGRAM/GRAFTOGRAM  2020    IR NON-TUNNELED CENTRAL LINE PLACEMENT  2021    IR NON-TUNNELED CENTRAL LINE PLACEMENT  10/4/2021    OOPHORECTOMY Bilateral     @ age 57    PERICARDIUM SURGERY      AR AMPUTATION FOOT,TRANSMETATARSAL Right 12/26/2021    Procedure: AMPUTATION TRANSMETATARSAL (TMA);  Surgeon: Derrek Vieira DPM;  [facility];  Service: Podiatry    AR AMPUTATION TOE,MT-P JT Right 5/28/2020    Procedure: AMPUTATION TOE- 5th;  Surgeon: Derrek Vieira DPM;  [facility];  Service: Podiatry    AR COLONOSCOPY FLX DX W/[address] PFRMD N/A 3/13/2019    Procedure: COLONOSCOPY;  Surgeon: Mia De León MD;  Location: [facility]; Service: Gastroenterology    AR ESOPHAGOGASTRODUODENOSCOPY TRANSORAL DIAGNOSTIC N/A 3/13/2019    Procedure: ESOPHAGOGASTRODUODENOSCOPY (EGD); Surgeon: Mia De León MD;  [facility]: [facility]; Service: Gastroenterology    AR LAPAROSCOPY W TOT HYSTERECT UTERUS 250 GRAM OR LESS N/A 2/16/2016    Procedure: HYSTERECTOMY LAPAROSCOPIC TOTAL [facility]), with bilateral salpingectomy;  Surgeon: Sandra Davidson DO;  [facility];  Service: Gynecology    THROMBECTOMY / ARTERIOVENOUS GRAFT REVISION      TOE SURGERY Right     removal of the 4th toe    WOUND DEBRIDEMENT Right 10/8/2021    Procedure: R 1st MTPJ washout ;  Surgeon: Leyla Wang DPM;  [facility];  Service: Podiatry       Family History   Problem Relation Age of Onset    Heart disease Family     Diabetes Family     Heart disease Mother     Cancer Brother         neck    Throat cancer Brother     Ovarian cancer Maternal Aunt 36     I have reviewed and agree with the history as documented      E-Cigarette/Vaping    E-Cigarette Use Never User      E-Cigarette/Vaping Substances    Nicotine No     THC No     CBD No     Flavoring No     Other No     Unknown No      Social History     Tobacco Use    Smoking status: Never Smoker    Smokeless tobacco: Never Used   Vaping Use    Vaping Use: Never used   Substance Use Topics    Alcohol use: Never     Alcohol/week: 0 0 standard drinks    Drug use: No        Review of Systems   Constitutional: Negative for chills, fatigue and fever  HENT: Negative for congestion, rhinorrhea and sore throat  Respiratory: Negative for cough, chest tightness and shortness of breath  Cardiovascular: Negative for chest pain and leg swelling  Gastrointestinal: Negative for abdominal distention, abdominal pain, diarrhea, nausea and vomiting  Genitourinary: Negative for difficulty urinating and dysuria  Musculoskeletal: Positive for back pain  Negative for arthralgias and myalgias  Skin: Negative for rash and wound  Neurological: Negative for dizziness, weakness and headaches  All other systems reviewed and are negative  Physical Exam  ED Triage Vitals   Temperature Pulse Respirations Blood Pressure SpO2   07/29/22 1115 07/29/22 1115 07/29/22 1115 07/29/22 1115 07/29/22 1115   97 8 °F (36 6 °C) 80 16 153/89 98 %      Temp Source Heart Rate Source Patient Position - Orthostatic VS BP Location FiO2 (%)   07/29/22 1700 07/29/22 1600 -- -- --   Oral Monitor         Pain Score       07/29/22 1115       10 - Worst Possible Pain             Orthostatic Vital Signs  Vitals:    07/29/22 1115 07/29/22 1600 07/29/22 1647 07/30/22 0503   BP: 153/89 (!) 194/96 95/54 135/95   Pulse: 80 73 73        Physical Exam  Vitals reviewed  Constitutional:       Appearance: She is obese  HENT:      Head: Normocephalic and atraumatic  Nose: Nose normal       Mouth/Throat:      Mouth: Mucous membranes are moist       Pharynx: Oropharynx is clear  Eyes:      Extraocular Movements: Extraocular movements intact  Comments: Left-sided subconjunctival hemorrhage   Cardiovascular:      Rate and Rhythm: Normal rate and regular rhythm  Pulses: Normal pulses  Heart sounds: Normal heart sounds  Pulmonary:      Effort: Pulmonary effort is normal       Breath sounds: Normal breath sounds  Abdominal:      General: Bowel sounds are normal       Palpations: Abdomen is soft  Tenderness: There is no abdominal tenderness  Musculoskeletal:         General: Tenderness (Right-sided lumbar paraspinal tenderness) present  Normal range of motion  Cervical back: Normal range of motion  Comments: Full range of motion of bilateral lower extremities  Skin:     General: Skin is warm and dry  Neurological:      General: No focal deficit present  Mental Status: She is alert and oriented to person, place, and time  Mental status is at baseline  Motor: No weakness           ED Medications  Medications   albuterol (PROVENTIL HFA,VENTOLIN HFA) inhaler 2 puff (has no administration in time range)   ALPRAZolam (XANAX) tablet 0 25 mg (has no administration in time range)   atorvastatin (LIPITOR) tablet 20 mg (20 mg Oral Given 7/29/22 1718)   b complex-vitamin C-folic acid (NEPHROCAPS) capsule 1 capsule (1 capsule Oral Given 7/29/22 1718)   carvedilol (COREG) tablet 25 mg (25 mg Oral Not Given 7/29/22 1714)   cinacalcet (SENSIPAR) tablet 30 mg (has no administration in time range)   dicyclomine (BENTYL) capsule 10 mg (10 mg Oral Given 7/30/22 0614)   gabapentin (NEURONTIN) capsule 100 mg (has no administration in time range)   levothyroxine tablet 50 mcg (has no administration in time range)   melatonin tablet 3 mg (3 mg Oral Given 7/30/22 0002)   loratadine (CLARITIN) tablet 10 mg (has no administration in time range)   NIFEdipine (PROCARDIA XL) 24 hr tablet 30 mg (has no administration in time range)   nystatin (MYCOSTATIN) powder ( Topical Given 7/29/22 1719)   pantoprazole (PROTONIX) EC tablet 40 mg (40 mg Oral Given 7/30/22 0542)   pregabalin (LYRICA) capsule 50 mg (has no administration in time range)   senna (SENOKOT) tablet 17 2 mg (has no administration in time range)   sertraline (ZOLOFT) tablet 75 mg (has no administration in time range)   sevelamer (RENAGEL) tablet 1,600 mg (1,600 mg Oral Given 7/29/22 1718)   torsemide (DEMADEX) tablet 100 mg (has no administration in time range)   warfarin (COUMADIN) tablet 9 mg (9 mg Oral Given 7/29/22 1718)   lidocaine (LIDODERM) 5 % patch 1 patch (has no administration in time range)   oxyCODONE (ROXICODONE) IR tablet 5 mg (has no administration in time range)   oxyCODONE (ROXICODONE) immediate release tablet 10 mg (10 mg Oral Given 7/30/22 0542)   acetaminophen (TYLENOL) tablet 650 mg (650 mg Oral Given 7/29/22 1933)   polyethylene glycol (MIRALAX) packet 17 g (has no administration in time range)   ondansetron (ZOFRAN) injection 4 mg (has no administration in time range)   insulin lispro (HumaLOG) 100 units/mL subcutaneous injection 1-5 Units (1 Units Subcutaneous Given 7/29/22 1721)   insulin lispro (HumaLOG) 100 units/mL subcutaneous injection 1-5 Units (1 Units Subcutaneous Not Given 7/29/22 2240)   insulin glargine (LANTUS) subcutaneous injection 20 Units 0 2 mL (20 Units Subcutaneous Not Given 7/29/22 2348)   insulin lispro (HumaLOG) 100 units/mL subcutaneous injection 5 Units (5 Units Subcutaneous Given 7/29/22 1721)   heparin (porcine) subcutaneous injection 5,000 Units (5,000 Units Subcutaneous Refused 7/30/22 0542)   Diclofenac Sodium (VOLTAREN) 1 % topical gel 2 g (2 g Topical Given 7/30/22 0003)   HYDROmorphone (DILAUDID) injection 1 mg (1 mg Intravenous Given 7/29/22 1333)   lidocaine (LIDODERM) 5 % patch 1 patch (1 patch Topical Patch Removed 7/30/22 0128)       Diagnostic Studies  Results Reviewed     Procedure Component Value Units Date/Time    CBC (With Platelets) [[id number]]     Lab Status: No result Specimen: Blood     Basic metabolic panel [[id number]]     Lab Status: No result Specimen: Blood     C-reactive protein [[id number]]     Lab Status: No result Specimen: Blood     Protime-INR [[id number]]     Lab Status: No result Specimen: Blood     Blood culture #1 [[id number]] Collected: 07/29/22 1218    Lab Status: Preliminary result Specimen: Blood from Hand, Right Updated: 07/29/22 1904     Blood Culture Received in Microbiology Lab  Culture in Progress      Blood culture #2 [[id number]] Collected: 07/29/22 1218    Lab Status: Preliminary result Specimen: Blood from Arm, Right Updated: 07/29/22 1804     Blood Culture Received in Microbiology Lab  Culture in Progress      Platelet count [[id number]]     Lab Status: No result Specimen: Blood     Protime-INR [[id number]]  (Abnormal) Collected: 07/29/22 1218    Lab Status: Final result Specimen: Blood from Arm, Right Updated: 07/29/22 1338     Protime 22 8 seconds      INR 9 52    Basic metabolic panel [[id number]]  (Abnormal) Collected: 07/29/22 1218    Lab Status: Final result Specimen: Blood from Arm, Right Updated: 07/29/22 1301     Sodium 141 mmol/L      Potassium 5 3 mmol/L      Chloride 105 mmol/L      CO2 30 mmol/L      ANION GAP 6 mmol/L      BUN 47 mg/dL      Creatinine 6 83 mg/dL      Glucose 317 mg/dL      Calcium 8 6 mg/dL      eGFR 6 ml/min/1 73sq m     Narrative:      [place] guidelines for Chronic Kidney Disease (CKD):     Stage 1 with normal or high GFR (GFR > 90 mL/min/1 73 square meters)    Stage 2 Mild CKD (GFR = 60-89 mL/min/1 73 square meters)    Stage 3A Moderate CKD (GFR = 45-59 mL/min/1 73 square meters)    Stage 3B Moderate CKD (GFR = 30-44 mL/min/1 73 square meters)    Stage 4 Severe CKD (GFR = 15-29 mL/min/1 73 square meters)    Stage 5 End Stage CKD (GFR <15 mL/min/1 73 square meters)  Note: GFR calculation is accurate only with a steady state creatinine    C-reactive protein [[id number]]  (Abnormal) Collected: 07/29/22 1218    Lab Status: Final result Specimen: Blood from Arm, Right Updated: 07/29/22 1301     CRP 20 8 mg/L     Sedimentation rate, automated [[id number]]  (Normal) Collected: 07/29/22 1218    Lab Status: Final result Specimen: Blood from Arm, Right Updated: 07/29/22 1254     Sed Rate 24 mm/hour     CBC and differential [[id number]]  (Abnormal) Collected: 07/29/22 1218    Lab Status: Final result Specimen: Blood from Arm, Right Updated: 07/29/22 1239     WBC 4 67 Thousand/uL      RBC 2 37 Million/uL      Hemoglobin 7 5 g/dL      Hematocrit 23 8 %       fL      MCH 31 6 pg      MCHC 31 5 g/dL      RDW 14 7 %      MPV 11 4 fL      Platelets 197 Thousands/uL      nRBC 0 /100 WBCs      Neutrophils Relative 68 %      Immat GRANS % 1 %      Lymphocytes Relative 17 %      Monocytes Relative 12 %      Eosinophils Relative 2 %      Basophils Relative 0 %      Neutrophils Absolute 3 14 Thousands/µL      Immature Grans Absolute 0 05 Thousand/uL      Lymphocytes Absolute 0 77 Thousands/µL      Monocytes Absolute 0 58 Thousand/µL      Eosinophils Absolute 0 11 Thousand/µL      Basophils Absolute 0 02 Thousands/µL                  CT spine lumbar wo contrast   Final Result by Charlotte Owusu MD (07/29 1738)      Vacuum disc phenomenon at L4-L5 with endplate erosive change at this level as well as involving the facet joints at L4-L5 and L5-S1  Findings are stable dating back to prior examinations since 6/28/2022 with overall mild interval progression since    11/10/2021  Imaging findings in this patient with end-stage renal disease are 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 with infection to be considered less likely (given the presence of vacuum disc phenomenon and relative stability over    one month)  Interval follow-up with MRI is again recommended to ensure stability as previously suggested  Stable central disc extrusion is again noted at L4-L5 demonstrating cranial migration and results in overall mild to moderate canal stenosis  Heterogeneous appearance of the visualized osseous structures consistent with renal osteodystrophy        Workstation performed: [id number]         MRI inpatient order    (Results Pending)   MRI inpatient order    (Results Pending)         Procedures  Procedures      ED Course                             SBIRT 22yo+    Flowsheet Row Most Recent Value   SBIRT (25 yo +)    In order to provide better care to our patients, we are screening all of our patients for alcohol and drug use  Would it be okay to ask you these screening questions? Yes Filed at: 07/29/2022 1253   Initial Alcohol Screen: US AUDIT-C     1  How often do you have a drink containing alcohol? 0 Filed at: 07/29/2022 1253   2  How many drinks containing alcohol do you have on a typical day you are drinking? 0 Filed at: 07/29/2022 1253   3b  FEMALE Any Age, or MALE 65+: How often do you have 4 or more drinks on one occassion? 0 Filed at: 07/29/2022 1253   Audit-C Score 0 Filed at: 07/29/2022 1253   ANGELO: How many times in the past year have you    Used an illegal drug or used a prescription medication for non-medical reasons? Never Filed at: 07/29/2022 1253                [facility]  Number of Diagnoses or Management Options  Back pain  Diagnosis management comments: 61-year-old female patient presenting with acute on chronic back pain  Patient was seen here in the past for this back pain  Patient had an MRI that showed marrow edema  Patient states pain is unbearable and patient is waiting for follow-up with neuro surgery on 8/11  Labs show mildly elevated CRP  Exam shows right paraspinal lumbar tenderness  Patient has a history of ESRD  Patient given 1 dose of Dilaudid however still complaining of pain  Will admit for intractable back pain         Amount and/or Complexity of Data Reviewed  Clinical lab tests: ordered and reviewed  Tests in the radiology section of CPT®: ordered and reviewed        Disposition  Final diagnoses:   Back pain     Time reflects when diagnosis was documented in both MDM as applicable and the Disposition within this note     Time User Action Codes Description Comment    7/29/2022  2:19 PM Patrick BAZZI [facility] Add [M54 9] Back pain     7/29/2022  2:47 PM Sabrina Ramirez Add [N18 6,  Z99 2] ESRD on hemodialysis       ED Disposition     ED Disposition   Admit    Condition   Stable    Date/Time   Fri Jul 29, 2022  2:19 PM    Comment   Case was discussed with Dr Rodney Womack and the patient's admission status was agreed to be Admission Status: observation status to the service of Dr Rodney Womack   Follow-up Information    None         Current Discharge Medication List      CONTINUE these medications which have NOT CHANGED    Details   Accu-Chek Guide test strip use to TEST BLOOD SUGAR two to three times a day      Accu-Chek Softclix Lancets lancets 3 (three) times a day Use to test blood sugar      albuterol (ProAir HFA) 90 mcg/act inhaler Inhale 2 puffs every 6 (six) hours as needed for wheezing or shortness of breath  Qty: 8 5 g, Refills: 0    Comments: Substitution to a formulary equivalent within the same pharmaceutical class is authorized    Associated Diagnoses: Flu-like symptoms      !! ALPRAZolam (XANAX) 0 25 mg tablet Take 0 25 mg by mouth 2 (two) times a day as needed for anxiety      !! ALPRAZolam (XANAX) 0 5 mg tablet TAKE 2 AND 1/2 TABLETS DAILY IN DIVIDED DOSES AS DIRECTED       ammonium lactate (LAC-HYDRIN) 12 % cream Apply topically 2 (two) times a day  Qty: 385 g, Refills: 0    Associated Diagnoses: Pruritus      atorvastatin (LIPITOR) 20 mg tablet Take 20 mg by mouth every evening   Refills: 0      B Complex-C-Folic Acid (Natalia-Denys) TABS TAKE ONE TABLET BY MOUTH DAILY  Qty: 90 tablet, Refills: 3    Associated Diagnoses: ESRD on dialysis (HCC)      carvedilol (COREG) 25 mg tablet TAKE ONE TABLET BY MOUTH TWICE A DAY  Qty: 180 tablet, Refills: 7    Associated Diagnoses: CKD stage 5 secondary to hypertension (HCC)      cinacalcet (SENSIPAR) 30 mg tablet Take 30 mg by mouth daily      dicyclomine (BENTYL) 10 mg capsule TAKE 1 TABLET BY MOUTH EVERY 6 HOURS OR AS NEEDED FOR PAIN      gabapentin (NEURONTIN) 100 mg capsule       insulin glargine (LANTUS) 100 units/mL subcutaneous injection Inject 12 Units under the skin daily at bedtime  Qty: 10 mL, Refills: 0    Associated Diagnoses: Diabetic polyneuropathy associated with type 2 diabetes mellitus ([facility] )      KIONEX 15 GM/60ML suspension Take by mouth Takes as a shake on dialysis days Tues-Thurs_Sat   Refills: 0      levothyroxine 50 mcg tablet Take 50 mcg by mouth daily      lidocaine (LIDODERM) 5 %       loratadine (CLARITIN) 10 mg tablet Take 10 mg by mouth daily      melatonin 3 mg Take 1 tablet (3 mg total) by mouth daily at bedtime  Qty: 30 tablet, Refills: 0    Associated Diagnoses: Insomnia      NIFEdipine (ADALAT CC) 30 MG 24 hr tablet Take 1 tablet (30 mg total) by mouth daily  Qty: 30 tablet, Refills: 0    Associated Diagnoses: Essential hypertension      NOVOLOG FLEXPEN 100 units/mL SOPN INJECT 1 TO 5 UNITS SUBCUTANEOUSLY THREE TIMES A DAY BEFORE MELAS AS PER SLIDING SCALE      nystatin (MYCOSTATIN) powder Apply topically 2 (two) times a day  Qty: 15 g, Refills: 0    Associated Diagnoses: Candidiasis of breast      ondansetron (ZOFRAN-ODT) 8 mg disintegrating tablet       pantoprazole (PROTONIX) 40 mg tablet take 1 tablet by mouth twice a day      polyethylene glycol (MIRALAX) 17 g packet Take 17 g by mouth daily for 7 days  Qty: 119 g, Refills: 0    Associated Diagnoses: Constipation      polyethylene glycol (MIRALAX) 17 g packet Take 17 g by mouth daily  Qty: 510 g, Refills: 5    Associated Diagnoses: Chronic constipation      pregabalin (LYRICA) 50 mg capsule Take 1 PO daily, take 1 PO additionally directly have HD on HD days  Qty: 45 capsule, Refills: 2    Associated Diagnoses: Diabetic polyneuropathy associated with type 2 diabetes mellitus ([place] [address] );  Lumbar radiculopathy      senna (SENOKOT) 8 6 mg Take 2 tablets (17 2 mg total) by mouth daily at bedtime as needed for constipation  Qty: 30 each, Refills: 0    Associated Diagnoses: Ambulatory dysfunction      sertraline (ZOLOFT) 50 mg tablet Take 1 tablet (50 mg total) by mouth daily  Qty: 30 tablet, Refills: 0    Associated Diagnoses: Anxiety disorder      sevelamer (RENAGEL) 800 mg tablet Take 2 tablets (1,600 mg total) by mouth 3 (three) times a day with meals  Qty: 30 tablet, Refills: 0    Associated Diagnoses: ESRD on hemodialysis (HCC)      Sevelamer Carbonate 2 4 g PACK STIR 1 PACKET INTO FOOD 3 TIMES A DAY WITH MEALS      torsemide (DEMADEX) 100 mg tablet Take 1 tablet (100 mg total) by mouth 4 (four) times a week On Sunday, Monday, Wednesday, Friday  Refills: 0    Associated Diagnoses: Parenchymal renal hypertension      urea (CARMOL) 40 % APPLY TO AFFECTED AREA TOPICALLY EVERY DAY  Qty: 85 g, Refills: 2    Associated Diagnoses: Diabetic polyneuropathy associated with type 2 diabetes mellitus (HCC)      warfarin (COUMADIN) 3 mg tablet Take 3 tablets (9 mg total) by mouth daily Takes 9 mg Monday Sat  Qty: 10 tablet, Refills: 0    Associated Diagnoses: History of DVT (deep vein thrombosis)       ! ! - Potential duplicate medications found  Please discuss with provider  No discharge procedures on file  PDMP Review       Value Time User    PDMP Reviewed  Yes 7/9/2022 11:11 PM Nathan Emery PA-C           ED Provider  Attending physically available and evaluated Ary Miller  I managed the patient along with the ED Attending      Electronically Signed by         Selvin Marie MD  07/30/22 0573

## 2022-07-29 NOTE — ASSESSMENT & PLAN NOTE
Lab Results   Component Value Date    EGFR 6 07/29/2022    EGFR 4 07/24/2022    EGFR 3 07/14/2022    CREATININE 6 83 (H) 07/29/2022    CREATININE 8 36 (H) 07/24/2022    CREATININE 9 88 (H) 07/14/2022   · Residual disease on hemodialysis  · Patient status post hemodialysis yesterday we will consult Nephrology

## 2022-07-29 NOTE — ASSESSMENT & PLAN NOTE
Lab Results   Component Value Date    HGBA1C 9 4 (H) 07/09/2022       No results for input(s): POCGLU in the last 72 hours  Blood Sugar Average: Last 72 hrs:  ·  patient with poorly-controlled diabetes  · Sugar in the ER greater than 300  · Increase Lantus to 20 units q h s    · Add mealtime insulin 5 units t i d  with meals continue fingerstick sliding scale coverage

## 2022-07-29 NOTE — H&P
[facility]  H&P- Remona Parody 1965, 56 y o  female MRN: [id number]  Unit/Bed#: ED 23 Encounter: [id number]  Primary Care Provider: Codey Alfonso MD   Date and time admitted to hospital: 7/29/2022 11:10 AM    * Intractable back pain  Assessment & Plan  · Patient with intractable back pain since last admission  · MRI on July 12th with questionable ostial versus diskitis with evidence of disc herniation  · Given increasing nature of pain will repeat MRI the LS spine  Check CRP  · Neuro surgical evaluation  · PT evaluation  · Low suspicion of osteo/diskitis at this time will hold off on antibiotics pending subspecialty evaluation further imaging  Patient's neurological exam is benign    ESRD on hemodialysis  Assessment & Plan  Lab Results   Component Value Date    EGFR 6 07/29/2022    EGFR 4 07/24/2022    EGFR 3 07/14/2022    CREATININE 6 83 (H) 07/29/2022    CREATININE 8 36 (H) 07/24/2022    CREATININE 9 88 (H) 07/14/2022   · Residual disease on hemodialysis  · Patient status post hemodialysis yesterday we will consult Nephrology    History of venous thromboembolism  Assessment & Plan  · Continue Coumadin for now    Essential hypertension  Assessment & Plan  · Continue Coreg    Diabetic polyneuropathy associated with type 2 diabetes mellitus ([facility] )  Assessment & Plan  Lab Results   Component Value Date    HGBA1C 9 4 (H) 07/09/2022       No results for input(s): POCGLU in the last 72 hours  Blood Sugar Average: Last 72 hrs:  ·  patient with poorly-controlled diabetes  · Sugar in the ER greater than 300  · Increase Lantus to 20 units q h s  · Add mealtime insulin 5 units t i d  with meals continue fingerstick sliding scale coverage    Morbid obesity  Assessment & Plan  · Dietary maneuvers discussed      VTE Pharmacologic Prophylaxis:   High Risk (Score >/= 5) - Pharmacological DVT Prophylaxis Ordered: heparin  Sequential Compression Devices Ordered    Code Status:  Full code  Discussion with family: Patient declined call to   Anticipated Length of Stay: Patient will be admitted on an observation basis with an anticipated length of stay of less than 2 midnights secondary to Intractable back       Total Time for Visit, including Counseling / Coordination of Care: 30 minutes Greater than 50% of this total time spent on direct patient counseling and coordination of care  Chief Complaint:  My back hurts    History of Present Illness:  Whit Novoa is a 56 y o  female with a PMH of end-stage renal disease, hypertension, type 2 diabetes poorly controlled recently infected with COVID   who presents with intractable back pain  Patient was recently in the hospital for [facility] this month  During that hospitalization she had MRI of the LS spine which revealed disc herniation with questionable osteo diskitis  She was discharged home  Saw Pain Management who was referring her to Neurosurgery  Patient was seen by Neurosurgery during the recent hospitalization who felt it was unlikely she had osteo and refer her for outpatient therapy  She has no present to the ER twice with intractable back pain  We were asked to admit her for pain control  Patient reports the pain has been worsening  Pain is on the right side of her back and radiating down the right leg  She occasionally wakes up with numbness of the right lower extremity  She denies any fecal incontinence although she does not really make urine  She feels pain is uncontrolled  And worsening in nature  Review of Systems:  Review of Systems   Constitutional: Negative for diaphoresis, fatigue and fever  Respiratory: Negative for cough, shortness of breath and stridor  Cardiovascular: Negative for chest pain, palpitations and leg swelling  Gastrointestinal: Positive for constipation (Some constipation)  Negative for abdominal pain, diarrhea, nausea and vomiting     Genitourinary: Negative for dysuria, frequency and urgency  Musculoskeletal: Positive for back pain and gait problem ( difficulty walking due to back pain)  Neurological: Negative for syncope and weakness  Psychiatric/Behavioral: Negative for confusion         Past Medical and Surgical History:   Past Medical History:   Diagnosis Date    Abnormal uterine bleeding (AUB)     Anemia     Anxiety     Arthritis     Chronic kidney disease     Claustrophobia     [facility]     Disease of thyroid gland     DVT (deep venous thrombosis) (HCC)     End stage kidney disease (HCC)     Foot ulcer due to secondary DM ([facility] )     Hemodialysis patient ([place] [address] )     Tues, Thurs, Sat    Hypertension     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[place] [address] )     right eye    Morbid obesity ([place] [address] )     Osteomyelitis of fifth toe of right foot (HCC)     Panic attacks     Pulmonary embolism (HCC)     Reflux esophagitis     Thrombophlebitis of saphenous vein     Warfarin anticoagulation        Past Surgical History:   Procedure Laterality Date    AMPUTATION      r 4th toe    ARTERIOVENOUS GRAFT PLACEMENT      CATARACT EXTRACTION Bilateral     CERVICAL BIOPSY  W/ LOOP ELECTRODE EXCISION       SECTION      DIALYSIS FISTULA CREATION      DILATION AND CURETTAGE OF UTERUS      ENDOMETRIAL ABLATION W/ NOVASURE      EYE SURGERY      HYSTERECTOMY      @ age 57 (complete)    IR AV FISTULAGRAM/GRAFTOGRAM  10/11/2019    IR AV FISTULAGRAM/GRAFTOGRAM  2020    IR AV FISTULAGRAM/GRAFTOGRAM  2020    IR NON-TUNNELED CENTRAL LINE PLACEMENT  2021    IR NON-TUNNELED CENTRAL LINE PLACEMENT  10/4/2021    OOPHORECTOMY Bilateral     @ age 57    PERICARDIUM SURGERY      AK AMPUTATION FOOT,TRANSMETATARSAL Right 2021    Procedure: AMPUTATION TRANSMETATARSAL (TMA);  Surgeon: Ced Smith DPM;  [facility];  Service: Podiatry    AK AMPUTATION TOE,MT-P JT Right 2020    Procedure: AMPUTATION TOE- 5th;  Surgeon: Marycruz Ritter DPM;  Location: [facility];  Service: Podiatry    CO COLONOSCOPY FLX DX W/COLLJ SPEC WHEN PFRMD N/A 3/13/2019    Procedure: COLONOSCOPY;  Surgeon: Landon Montanez MD;  Location: [facility]; Service: Gastroenterology    CO ESOPHAGOGASTRODUODENOSCOPY TRANSORAL DIAGNOSTIC N/A 3/13/2019    Procedure: ESOPHAGOGASTRODUODENOSCOPY (EGD); Surgeon: Landon Montanez MD;  Location: [facility]; Service: Gastroenterology    CO LAPAROSCOPY W TOT HYSTERECT UTERUS 250 GRAM OR LESS N/A 2/16/2016    Procedure: HYSTERECTOMY LAPAROSCOPIC TOTAL [facility]), with bilateral salpingectomy;  Surgeon: Kingston Goncalves DO;  Location: [facility];  Service: Gynecology    THROMBECTOMY / ARTERIOVENOUS GRAFT REVISION      TOE SURGERY Right     removal of the 4th toe    WOUND DEBRIDEMENT Right 10/8/2021    Procedure: R 1st MTPJ washout ;  Surgeon: Pham Bradford DPM;  [facility];  Service: Podiatry       Meds/Allergies:  Prior to Admission medications    Medication Sig Start Date End Date Taking?  Authorizing Provider   Accu-Chek Guide test strip use to TEST BLOOD SUGAR two to three times a day 7/5/21   Historical Provider, MD   Accu-Chek Softclix Lancets lancets 3 (three) times a day Use to test blood sugar 12/30/20   Historical Provider, MD   albuterol (ProAir HFA) 90 mcg/act inhaler Inhale 2 puffs every 6 (six) hours as needed for wheezing or shortness of breath 7/3/21   Prisca Calvo DO   ALPRAZolam Stefano Evans) 0 25 mg tablet Take 0 25 mg by mouth 2 (two) times a day as needed for anxiety    Historical Provider, MD ROSASZocorina Evans) 0 5 mg tablet TAKE 2 AND 1/2 TABLETS DAILY IN DIVIDED DOSES AS DIRECTED  3/14/22   Historical Provider, MD   ammonium lactate (LAC-HYDRIN) 12 % cream Apply topically 2 (two) times a day 11/13/21   Nanette Cosby MD   atorvastatin (LIPITOR) 20 mg tablet Take 20 mg by mouth every evening  4/24/18   Historical Provider, MD   B Complex-C-Folic Acid (Natalia-Denys) TABS TAKE ONE TABLET BY MOUTH DAILY 5/17/22 Tommy Hodge DO   carvedilol (COREG) 25 mg tablet TAKE ONE TABLET BY MOUTH TWICE A DAY 6/17/22   Liz Theodroe DO   cinacalcet (SENSIPAR) 30 mg tablet Take 30 mg by mouth daily    Historical Provider, MD   dicyclomine (BENTYL) 10 mg capsule TAKE 1 TABLET BY MOUTH EVERY 6 HOURS OR AS NEEDED FOR PAIN 3/10/22   Historical Provider, MD   gabapentin (NEURONTIN) 100 mg capsule  7/13/22   Historical Provider, MD   insulin glargine (LANTUS) 100 units/mL subcutaneous injection Inject 12 Units under the skin daily at bedtime 7/14/22   Antione Cha PA-C   Abdias Grammes 15 GM/60ML suspension Take by mouth Takes as a shake on dialysis days Tues-Thurs_Sat  12/10/18   Historical Provider, MD   levothyroxine 50 mcg tablet Take 50 mcg by mouth daily    Historical Provider, MD   lidocaine (LIDODERM) 5 %  4/20/22   Historical Provider, MD   loratadine (CLARITIN) 10 mg tablet Take 10 mg by mouth daily    Historical Provider, MD   melatonin 3 mg Take 1 tablet (3 mg total) by mouth daily at bedtime 11/13/21   Jazmin Zamora MD   NIFEdipine (ADALAT CC) 30 MG 24 hr tablet Take 1 tablet (30 mg total) by mouth daily 6/27/21   Jazmin Zamora MD   NOVOLOG FLEXPEN 100 units/mL SOPN INJECT 1 TO 5 UNITS SUBCUTANEOUSLY THREE TIMES A DAY BEFORE MELAS AS PER SLIDING SCALE 4/30/20   Historical Provider, MD   nystatin (MYCOSTATIN) powder Apply topically 2 (two) times a day 8/31/19   Fan Horton MD   ondansetron (ZOFRAN-ODT) 8 mg disintegrating tablet  4/27/22   Historical Provider, MD   pantoprazole (PROTONIX) 40 mg tablet take 1 tablet by mouth twice a day 10/11/21   Historical Provider, MD   polyethylene glycol (MIRALAX) 17 g packet Take 17 g by mouth daily for 7 days 11/13/21 11/20/21  Jazmin Zamora MD   polyethylene glycol (MIRALAX) 17 g packet Take 17 g by mouth daily 12/9/21 6/7/22  Maureen Jose MD   pregabalin (LYRICA) 50 mg capsule Take 1 PO daily, take 1 PO additionally directly have HD on HD days 7/25/22 María Rueda DO   senna (SENOKOT) 8 6 mg Take 2 tablets (17 2 mg total) by mouth daily at bedtime as needed for constipation 2/21/20   LOIS Payan   sertraline (ZOLOFT) 50 mg tablet Take 1 tablet (50 mg total) by mouth daily  Patient taking differently: Take 75 mg by mouth in the morning  4/28/19   LOIS Okeefe   sevelamer (RENAGEL) 800 mg tablet Take 2 tablets (1,600 mg total) by mouth 3 (three) times a day with meals 11/13/21   Jazmin Zamora MD   Sevelamer Carbonate 2 4 g PACK STIR 1 PACKET INTO FOOD 3 TIMES A DAY WITH MEALS 7/15/22   Historical Provider, MD   torsemide (DEMADEX) 100 mg tablet Take 1 tablet (100 mg total) by mouth 4 (four) times a week On Sunday, Monday, Wednesday, Friday  Patient taking differently: Take 100 mg by mouth daily On Sunday, Monday, Wednesday, Friday (pt states she takes it everyday) 11/13/21   Jazmin Zamora MD   urea (CARMOL) 40 % APPLY TO AFFECTED AREA TOPICALLY EVERY DAY 2/18/22   Natalee Ace DPM   warfarin (COUMADIN) 3 mg tablet Take 3 tablets (9 mg total) by mouth daily Takes 9 mg Monday Sat 6/11/21   LOIS Landis   methocarbamol (ROBAXIN) 500 mg tablet Take 1 tablet (500 mg total) by mouth 2 (two) times a day 6/11/22 7/29/22  Charles Cook DO   metoclopramide (REGLAN) 10 mg/10 mL oral solution Take 5 mL (5 mg total) by mouth every 6 (six) hours as needed (nausea) 11/26/21 7/29/22  Campos Kitchen MD   zolpidem (AMBIEN) 5 mg tablet Take 5 mg by mouth daily at bedtime 3/15/22 7/29/22  Historical Provider, MD HAMMOND have reviewed home medications with patient personally  Allergies:    Allergies   Allergen Reactions    Iodinated Diagnostic Agents Itching    Keflex [Cephalexin] Hives    Eggs Or Egg-Derived Products - Food Allergy Rash    Wound Dressing Adhesive Rash       Social History:  Marital Status: Single   Occupation:   Patient Pre-hospital Living Situation: Home  Patient Pre-hospital Level of Mobility: nathans  Patient Pre-hospital Diet Restrictions:   Substance Use History:   Social History     Substance and Sexual Activity   Alcohol Use Never    Alcohol/week: 0 0 standard drinks     Social History     Tobacco Use   Smoking Status Never Smoker   Smokeless Tobacco Never Used     Social History     Substance and Sexual Activity   Drug Use No       Family History:  Family History   Problem Relation Age of Onset    Heart disease Family     Diabetes Family     Heart disease Mother     Cancer Brother         neck    Throat cancer Brother     Ovarian cancer Maternal Aunt 40       Physical Exam:     Vitals:   Blood Pressure: 153/89 (07/29/22 1115)  Pulse: 80 (07/29/22 1115)  Temperature: 97 8 °F (36 6 °C) (07/29/22 1115)  Respirations: 16 (07/29/22 1115)  SpO2: 98 % (07/29/22 1115)    Physical Exam  Constitutional:       General: She is not in acute distress  Appearance: She is obese  She is not diaphoretic  HENT:      Head: Normocephalic and atraumatic  Eyes:      General: No scleral icterus  Cardiovascular:      Rate and Rhythm: Normal rate and regular rhythm  Pulses: Normal pulses  Heart sounds: Normal heart sounds  No murmur heard  No gallop  Pulmonary:      Effort: Pulmonary effort is normal  No respiratory distress  Breath sounds: Normal breath sounds  No stridor  No wheezing, rhonchi or rales  Abdominal:      General: There is no distension  Palpations: Abdomen is soft  There is no mass  Tenderness: There is no abdominal tenderness  There is no guarding or rebound  Musculoskeletal:      Cervical back: Neck supple  Skin:     General: Skin is warm and dry  Neurological:      Mental Status: She is oriented to person, place, and time        Comments: Straight leg raise negative  5/5 dorsi and plantar flexion bilaterally extremities  2+ patellar reflexes bilaterally   Psychiatric:         Mood and Affect: Mood normal           Additional Data:     Lab Results:  Results from last 7 days   Lab Units 07/29/22  1218   WBC Thousand/uL 4 67   HEMOGLOBIN g/dL 7 5*   HEMATOCRIT % 23 8*   PLATELETS Thousands/uL 114*   NEUTROS PCT % 68   LYMPHS PCT % 17   MONOS PCT % 12   EOS PCT % 2     Results from last 7 days   Lab Units 07/29/22  1218   SODIUM mmol/L 141   POTASSIUM mmol/L 5 3   CHLORIDE mmol/L 105   CO2 mmol/L 30   BUN mg/dL 47*   CREATININE mg/dL 6 83*   ANION GAP mmol/L 6   CALCIUM mg/dL 8 6   GLUCOSE RANDOM mg/dL 317*     Results from last 7 days   Lab Units 07/29/22  1218   INR  1 99*                   Imaging: Reviewed radiology reports from this admission including: chest xray  No orders to display       EKG and Other Studies Reviewed on Admission:   · EKG: No EKG obtained  ** Please Note: This note has been constructed using a voice recognition system   **

## 2022-07-29 NOTE — PLAN OF CARE
Problem: PAIN - ADULT  Goal: Verbalizes/displays adequate comfort level or baseline comfort level  Description: Interventions:  - Encourage patient to monitor pain and request assistance  - Assess pain using appropriate pain scale  - Administer analgesics based on type and severity of pain and evaluate response  - Implement non-pharmacological measures as appropriate and evaluate response  - Consider cultural and social influences on pain and pain management  - Notify physician/advanced practitioner if interventions unsuccessful or patient reports new pain  Outcome: Progressing     Problem: INFECTION - ADULT  Goal: Absence or prevention of progression during hospitalization  Description: INTERVENTIONS:  - Assess and monitor for signs and symptoms of infection  - Monitor lab/diagnostic results  - Monitor all insertion sites, i e  indwelling lines, tubes, and drains  - Monitor endotracheal if appropriate and nasal secretions for changes in amount and color  - Russell appropriate cooling/warming therapies per order  - Administer medications as ordered  - Instruct and encourage patient and family to use good hand hygiene technique  - Identify and instruct in appropriate isolation precautions for identified infection/condition  Outcome: Progressing  Goal: Absence of fever/infection during neutropenic period  Description: INTERVENTIONS:  - Monitor WBC    Outcome: Progressing

## 2022-07-30 ENCOUNTER — APPOINTMENT (OUTPATIENT)
Dept: DIALYSIS | Facility: HOSPITAL | Age: 57
DRG: 551 | End: 2022-07-30
Payer: MEDICARE

## 2022-07-30 LAB
ANION GAP SERPL CALCULATED.3IONS-SCNC: 4 MMOL/L (ref 4–13)
BUN SERPL-MCNC: 53 MG/DL (ref 5–25)
CALCIUM SERPL-MCNC: 8.6 MG/DL (ref 8.3–10.1)
CHLORIDE SERPL-SCNC: 106 MMOL/L (ref 96–108)
CO2 SERPL-SCNC: 30 MMOL/L (ref 21–32)
CREAT SERPL-MCNC: 8.21 MG/DL (ref 0.6–1.3)
CRP SERPL QL: 16.9 MG/L
ERYTHROCYTE [DISTWIDTH] IN BLOOD BY AUTOMATED COUNT: 14.6 % (ref 11.6–15.1)
GFR SERPL CREATININE-BSD FRML MDRD: 4 ML/MIN/1.73SQ M
GLUCOSE P FAST SERPL-MCNC: 185 MG/DL (ref 65–99)
GLUCOSE SERPL-MCNC: 108 MG/DL (ref 65–140)
GLUCOSE SERPL-MCNC: 145 MG/DL (ref 65–140)
GLUCOSE SERPL-MCNC: 185 MG/DL (ref 65–140)
GLUCOSE SERPL-MCNC: 224 MG/DL (ref 65–140)
GLUCOSE SERPL-MCNC: 237 MG/DL (ref 65–140)
HCT VFR BLD AUTO: 24.2 % (ref 34.8–46.1)
HGB BLD-MCNC: 7.4 G/DL (ref 11.5–15.4)
INR PPP: 2.21 (ref 0.84–1.19)
MCH RBC QN AUTO: 30.5 PG (ref 26.8–34.3)
MCHC RBC AUTO-ENTMCNC: 30.6 G/DL (ref 31.4–37.4)
MCV RBC AUTO: 100 FL (ref 82–98)
PLATELET # BLD AUTO: 112 THOUSANDS/UL (ref 149–390)
PMV BLD AUTO: 11 FL (ref 8.9–12.7)
POTASSIUM SERPL-SCNC: 5 MMOL/L (ref 3.5–5.3)
PROTHROMBIN TIME: 24.8 SECONDS (ref 11.6–14.5)
RBC # BLD AUTO: 2.43 MILLION/UL (ref 3.81–5.12)
SODIUM SERPL-SCNC: 140 MMOL/L (ref 135–147)
WBC # BLD AUTO: 5.16 THOUSAND/UL (ref 4.31–10.16)

## 2022-07-30 PROCEDURE — 85027 COMPLETE CBC AUTOMATED: CPT | Performed by: INTERNAL MEDICINE

## 2022-07-30 PROCEDURE — 80048 BASIC METABOLIC PNL TOTAL CA: CPT | Performed by: INTERNAL MEDICINE

## 2022-07-30 PROCEDURE — G0257 UNSCHED DIALYSIS ESRD PT HOS: HCPCS

## 2022-07-30 PROCEDURE — 85610 PROTHROMBIN TIME: CPT | Performed by: INTERNAL MEDICINE

## 2022-07-30 PROCEDURE — 5A1D70Z PERFORMANCE OF URINARY FILTRATION, INTERMITTENT, LESS THAN 6 HOURS PER DAY: ICD-10-PCS | Performed by: INTERNAL MEDICINE

## 2022-07-30 PROCEDURE — 82948 REAGENT STRIP/BLOOD GLUCOSE: CPT

## 2022-07-30 PROCEDURE — 99232 SBSQ HOSP IP/OBS MODERATE 35: CPT | Performed by: PHYSICIAN ASSISTANT

## 2022-07-30 PROCEDURE — 86140 C-REACTIVE PROTEIN: CPT | Performed by: INTERNAL MEDICINE

## 2022-07-30 PROCEDURE — 90935 HEMODIALYSIS ONE EVALUATION: CPT | Performed by: INTERNAL MEDICINE

## 2022-07-30 RX ORDER — MUSCLE RUB CREAM 100; 150 MG/G; MG/G
CREAM TOPICAL 4 TIMES DAILY PRN
Status: DISCONTINUED | OUTPATIENT
Start: 2022-07-30 | End: 2022-08-04 | Stop reason: HOSPADM

## 2022-07-30 RX ORDER — HYDROMORPHONE HCL IN WATER/PF 6 MG/30 ML
0.2 PATIENT CONTROLLED ANALGESIA SYRINGE INTRAVENOUS EVERY 4 HOURS PRN
Status: DISCONTINUED | OUTPATIENT
Start: 2022-07-30 | End: 2022-08-04 | Stop reason: HOSPADM

## 2022-07-30 RX ORDER — ACETAMINOPHEN 325 MG/1
975 TABLET ORAL EVERY 8 HOURS SCHEDULED
Status: DISCONTINUED | OUTPATIENT
Start: 2022-07-30 | End: 2022-08-01

## 2022-07-30 RX ORDER — INSULIN GLARGINE 100 [IU]/ML
12 INJECTION, SOLUTION SUBCUTANEOUS
Status: DISCONTINUED | OUTPATIENT
Start: 2022-07-30 | End: 2022-08-03

## 2022-07-30 RX ADMIN — PREGABALIN 50 MG: 50 CAPSULE ORAL at 13:08

## 2022-07-30 RX ADMIN — OXYCODONE HYDROCHLORIDE 10 MG: 10 TABLET ORAL at 14:08

## 2022-07-30 RX ADMIN — DICYCLOMINE HYDROCHLORIDE 10 MG: 10 CAPSULE ORAL at 06:14

## 2022-07-30 RX ADMIN — CARVEDILOL 25 MG: 25 TABLET, FILM COATED ORAL at 17:13

## 2022-07-30 RX ADMIN — ATORVASTATIN CALCIUM 20 MG: 20 TABLET, FILM COATED ORAL at 17:13

## 2022-07-30 RX ADMIN — SEVELAMER HYDROCHLORIDE 1600 MG: 800 TABLET ORAL at 17:12

## 2022-07-30 RX ADMIN — TORSEMIDE 100 MG: 20 TABLET ORAL at 13:07

## 2022-07-30 RX ADMIN — INSULIN LISPRO 2 UNITS: 100 INJECTION, SOLUTION INTRAVENOUS; SUBCUTANEOUS at 07:43

## 2022-07-30 RX ADMIN — DICYCLOMINE HYDROCHLORIDE 10 MG: 10 CAPSULE ORAL at 13:08

## 2022-07-30 RX ADMIN — WARFARIN SODIUM 9 MG: 3 TABLET ORAL at 17:13

## 2022-07-30 RX ADMIN — MENTHOL, METHYL SALICYLATE: 10; 15 CREAM TOPICAL at 20:57

## 2022-07-30 RX ADMIN — GABAPENTIN 100 MG: 100 CAPSULE ORAL at 13:08

## 2022-07-30 RX ADMIN — HYDROMORPHONE HYDROCHLORIDE 0.2 MG: 0.2 INJECTION, SOLUTION INTRAMUSCULAR; INTRAVENOUS; SUBCUTANEOUS at 20:55

## 2022-07-30 RX ADMIN — DICLOFENAC SODIUM 2 G: 10 GEL TOPICAL at 00:03

## 2022-07-30 RX ADMIN — INSULIN GLARGINE 12 UNITS: 100 INJECTION, SOLUTION SUBCUTANEOUS at 21:00

## 2022-07-30 RX ADMIN — SERTRALINE HYDROCHLORIDE 75 MG: 50 TABLET ORAL at 13:07

## 2022-07-30 RX ADMIN — NIFEDIPINE 30 MG: 30 TABLET, FILM COATED, EXTENDED RELEASE ORAL at 13:07

## 2022-07-30 RX ADMIN — OXYCODONE HYDROCHLORIDE 10 MG: 10 TABLET ORAL at 19:30

## 2022-07-30 RX ADMIN — ALPRAZOLAM 0.25 MG: 0.25 TABLET ORAL at 07:45

## 2022-07-30 RX ADMIN — INSULIN LISPRO 5 UNITS: 100 INJECTION, SOLUTION INTRAVENOUS; SUBCUTANEOUS at 13:11

## 2022-07-30 RX ADMIN — PANTOPRAZOLE SODIUM 40 MG: 40 TABLET, DELAYED RELEASE ORAL at 05:42

## 2022-07-30 RX ADMIN — OXYCODONE HYDROCHLORIDE 10 MG: 10 TABLET ORAL at 10:10

## 2022-07-30 RX ADMIN — HYDROMORPHONE HYDROCHLORIDE 0.2 MG: 0.2 INJECTION, SOLUTION INTRAMUSCULAR; INTRAVENOUS; SUBCUTANEOUS at 16:55

## 2022-07-30 RX ADMIN — NEPHROCAP 1 CAPSULE: 1 CAP ORAL at 17:13

## 2022-07-30 RX ADMIN — Medication 3 MG: at 00:02

## 2022-07-30 RX ADMIN — SEVELAMER HYDROCHLORIDE 1600 MG: 800 TABLET ORAL at 07:43

## 2022-07-30 RX ADMIN — Medication 3 MG: at 21:00

## 2022-07-30 RX ADMIN — DICLOFENAC SODIUM 2 G: 10 GEL TOPICAL at 17:16

## 2022-07-30 RX ADMIN — INSULIN LISPRO 5 UNITS: 100 INJECTION, SOLUTION INTRAVENOUS; SUBCUTANEOUS at 07:44

## 2022-07-30 RX ADMIN — DICYCLOMINE HYDROCHLORIDE 10 MG: 10 CAPSULE ORAL at 21:00

## 2022-07-30 RX ADMIN — NYSTATIN: 100000 POWDER TOPICAL at 17:16

## 2022-07-30 RX ADMIN — DICYCLOMINE HYDROCHLORIDE 10 MG: 10 CAPSULE ORAL at 17:13

## 2022-07-30 RX ADMIN — SEVELAMER HYDROCHLORIDE 1600 MG: 800 TABLET ORAL at 13:06

## 2022-07-30 RX ADMIN — LORATADINE 10 MG: 10 TABLET ORAL at 13:08

## 2022-07-30 RX ADMIN — ACETAMINOPHEN 975 MG: 325 TABLET ORAL at 17:12

## 2022-07-30 RX ADMIN — CINACALCET 30 MG: 30 TABLET, FILM COATED ORAL at 13:07

## 2022-07-30 RX ADMIN — OXYCODONE HYDROCHLORIDE 10 MG: 10 TABLET ORAL at 00:02

## 2022-07-30 RX ADMIN — OXYCODONE HYDROCHLORIDE 10 MG: 10 TABLET ORAL at 05:42

## 2022-07-30 RX ADMIN — DICLOFENAC SODIUM 2 G: 10 GEL TOPICAL at 07:47

## 2022-07-30 RX ADMIN — DICYCLOMINE HYDROCHLORIDE 10 MG: 10 CAPSULE ORAL at 00:02

## 2022-07-30 RX ADMIN — CARVEDILOL 25 MG: 25 TABLET, FILM COATED ORAL at 13:08

## 2022-07-30 RX ADMIN — INSULIN LISPRO 2 UNITS: 100 INJECTION, SOLUTION INTRAVENOUS; SUBCUTANEOUS at 21:00

## 2022-07-30 RX ADMIN — ACETAMINOPHEN 975 MG: 325 TABLET ORAL at 21:00

## 2022-07-30 NOTE — PROGRESS NOTES
[address] [facility]  Progress Note Justa Narayan 1965, 56 y o  female MRN: [id number]  Unit/Bed#: [facility] 814-01 Encounter: [id number]  Primary Care Provider: Kobi Ferrera MD   Date and time admitted to hospital: 7/29/2022 11:10 AM    * Intractable back pain  Assessment & Plan  · Patient complains of low back pain with bilateral lower extremity radiculopathy, right greater than left  · Patient was seen by ID and Neurosurgery during recent admission for abnormal MRI of the lumbar spine and concern for possible discitis/OM but was felt that this was unlikely  · Neurosurgery following this admission, appreciate their ongoing recommendations  · Plan for repeat MRI lumbar spine (likely late Sunday/early Monday per Neurosurgery consult given need for HD after MRI)  · CRP elevated but lower than last admission  · Pain control   · Follows with Pain Management as outpatient     ESRD on hemodialysis  Assessment & Plan  Lab Results   Component Value Date    EGFR 4 07/30/2022    EGFR 6 07/29/2022    EGFR 4 07/24/2022    CREATININE 8 21 (H) 07/30/2022    CREATININE 6 83 (H) 07/29/2022    CREATININE 8 36 (H) 07/24/2022   · Nephrology following for management of dialysis    History of venous thromboembolism  Assessment & Plan  · Continue Coumadin  · Monitor INR    Type 2 diabetes mellitus [facility])  Assessment & Plan  Lab Results   Component Value Date    HGBA1C 9 4 (H) 07/09/2022       Recent Labs     07/29/22  2150 07/29/22  2213 07/30/22  0726 07/30/22  1300   POCGLU 43* 72 224* 108       Blood Sugar Average: Last 72 hrs:  (P) 130 4     · With hypoglycemia overnight  · Decrease Lantus to 12 units  · Discontinue scheduled Humalog for now  · Continue SSI and glucose checks TID AC and QHS  · Hypoglycemia protocol     Essential hypertension  Assessment & Plan  · Continue Coreg    Morbid obesity  Assessment & Plan  · Body mass index is 46 26 kg/m²    · Encourage dietary and lifestyle modifications VTE Pharmacologic Prophylaxis:   coumadin    Patient Centered Rounds: I performed bedside rounds with nursing staff today  d/w RN  Discussions with Specialists or Other Care Team Provider:     Education and Discussions with Family / Patient: Attempted to update  (significant other) via phone  Unable to contact  I attempted to contact the patient's significant other, Surjit, at the patient's request and at the number she provided me however no one answered and no name on VM message to confirm the recipient of any message thus no message was left     Time Spent for Care: 30 minutes  More than 50% of total time spent on counseling and coordination of care as described above  Current Length of Stay: 0 day(s)  Current Patient Status: Observation   Certification Statement: The patient will continue to require additional inpatient hospital stay due to pain control, work up including MRI as above  Discharge Plan: pending pain control and MRI and Neurosurgery recs as above    Code Status: Level 1 - Full Code    Subjective:   Ms Lindy Barraza reports low back pain with radiation both her legs mostly on the right  She denies saddle anesthesia  She reports she barely makes any urine being on HD  Objective:     Vitals:   Temp (24hrs), Av 8 °F (36 6 °C), Min:97 4 °F (36 3 °C), Max:98 1 °F (36 7 °C)    Temp:  [97 4 °F (36 3 °C)-98 1 °F (36 7 °C)] 98 1 °F (36 7 °C)  HR:  [] 80  Resp:  [16-20] 20  BP: ()/() 134/88  SpO2:  [92 %] 92 %  Body mass index is 46 26 kg/m²  Input and Output Summary (last 24 hours): Intake/Output Summary (Last 24 hours) at 2022 1603  Last data filed at 2022 1200  Gross per 24 hour   Intake 530 ml   Output 3503 ml   Net -2973 ml       Physical Exam:   Physical Exam  Vitals and nursing note reviewed  Constitutional:       Appearance: She is obese        Comments: Patient seen lying in bed, NAD   Cardiovascular:      Rate and Rhythm: Normal rate and regular rhythm  Pulmonary:      Effort: Pulmonary effort is normal  No respiratory distress  Breath sounds: Normal breath sounds  Abdominal:      General: Bowel sounds are normal       Palpations: Abdomen is soft  Musculoskeletal:      Right lower leg: No edema  Left lower leg: No edema  Skin:     General: Skin is warm  Neurological:      Mental Status: She is alert and oriented to person, place, and time  Psychiatric:         Mood and Affect: Mood normal          Behavior: Behavior normal           Additional Data:     Labs:  Results from last 7 days   Lab Units 07/30/22  0901 07/29/22  1218   WBC Thousand/uL 5 16 4 67   HEMOGLOBIN g/dL 7 4* 7 5*   HEMATOCRIT % 24 2* 23 8*   PLATELETS Thousands/uL 112* 114*   NEUTROS PCT %  --  68   LYMPHS PCT %  --  17   MONOS PCT %  --  12   EOS PCT %  --  2     Results from last 7 days   Lab Units 07/30/22  0901   SODIUM mmol/L 140   POTASSIUM mmol/L 5 0   CHLORIDE mmol/L 106   CO2 mmol/L 30   BUN mg/dL 53*   CREATININE mg/dL 8 21*   ANION GAP mmol/L 4   CALCIUM mg/dL 8 6   GLUCOSE RANDOM mg/dL 185*     Results from last 7 days   Lab Units 07/30/22  0901   INR  2 21*     Results from last 7 days   Lab Units 07/30/22  1300 07/30/22  0726 07/29/22  2213 07/29/22  2150 07/29/22  1643   POC GLUCOSE mg/dl 108 224* 72 43* 205*               Lines/Drains:  Invasive Devices  Report    Peripheral Intravenous Line  Duration           Peripheral IV 07/29/22 Proximal;Right;Ventral (anterior) Forearm <1 day          Line  Duration           Hemodialysis AV Fistula 02/20/18 Left Forearm 1620 days                      Imaging: Reviewed radiology reports from this admission including: CT L spine    Recent Cultures (last 7 days):   Results from last 7 days   Lab Units 07/29/22  1218   BLOOD CULTURE  Received in Microbiology Lab  Culture in Progress  Received in Microbiology Lab  Culture in Progress         Last 24 Hours Medication List:   Current Facility-Administered Medications   Medication Dose Route Frequency Provider Last Rate    acetaminophen  650 mg Oral Q6H PRN Mandy Ferguson MD      albuterol  2 puff Inhalation Q6H PRN Mandy Ferguson MD      ALPRAZolam  0 25 mg Oral BID PRN Mandy Ferguson MD      atorvastatin  20 mg Oral QPM Mandy Ferguson MD      b complex-vitamin C-folic acid  1 capsule Oral Daily With Katlin Mike MD      carvedilol  25 mg Oral BID Mandy Ferguson MD      cinacalcet  30 mg Oral Daily Mandy Ferguson MD      Diclofenac Sodium  2 g Topical 4x Daily LOIS Mills      dicyclomine  10 mg Oral 4x Daily (AC & HS) Mandy Ferguson MD      gabapentin  100 mg Oral Daily Mandy Ferguson MD      heparin (porcine)  5,000 Units Subcutaneous Q8H [address] Triage Protocol Emergency, MD      insulin glargine  12 Units Subcutaneous HS Shelia Cade PA-C      insulin lispro  1-5 Units Subcutaneous TID Martinez Heard MD      insulin lispro  1-5 Units Subcutaneous HS Mandy Ferguson MD      levothyroxine  50 mcg Oral Daily Mandy Ferguson MD      lidocaine  1 patch Topical Daily Mandy Ferguson MD      loratadine  10 mg Oral Daily Mandy Ferguson MD      melatonin  3 mg Oral HS Mandy Ferguson MD      NIFEdipine  30 mg Oral Daily Mandy Ferguson MD      nystatin   Topical BID Mandy Ferguson MD      ondansetron  4 mg Intravenous Q4H PRN Mandy Ferguson MD      oxyCODONE  10 mg Oral Q4H PRN Mandy Ferguson MD      oxyCODONE  5 mg Oral Q4H PRN Mandy Ferguson MD      pantoprazole  40 mg Oral Early Morning Mandy Ferguson MD      polyethylene glycol  17 g Oral Daily Mandy Ferguson MD      pregabalin  50 mg Oral Daily Mandy Ferguson MD      senna  2 tablet Oral HS PRN Mandy Ferguson MD      sertraline  75 mg Oral Daily Mandy Ferguson MD      sevelamer  1,600 mg Oral TID With Meals Mandy Ferguson MD      torsemide  100 mg Oral Daily Mandy Ferguson MD      warfarin  9 mg Oral Daily (warfarin) Barb Powell MD          Today, Patient Was Seen By: Zoran Oquendo PA-C    **Please Note: This note may have been constructed using a voice recognition system  **

## 2022-07-30 NOTE — NURSING NOTE
Pt  Blood sugar at 2150 was 43  Pt was given orange juice and a snack  Recheck of pt 's blood sugar was 72  SLIM provider (Gabby Vital)  notified of low blood sugar and treatment  Order given to hold Lantus dose this evening  Will continue to monitor

## 2022-07-30 NOTE — PROGRESS NOTES
NEPHROLOGY PROGRESS NOTE   Caitlin Antonio 56 y o  female MRN: [id number]  Unit/Bed#: [facility] 814-01 Encounter: [id number]  Reason for Consult: ESRD    ASSESSMENT AND PLAN:  56year old female with a history of end-stage renal disease on hemodialysis, anemia of chronic disease, DVT who presents for significant lower back pain  We are consulted for dialysis management  ESRD on HD on TTS schedule [address]  -HD today  -goal UF to outpatient dry weight  -will obtain outpatient dry weight  Today pre HD weight 124 kg  I saw and examined patient during hemodialysis treatment at 9:50 AM on 7/30/2022  The patient was receiving hemodialysis for treatment of end stage renal disease  I also reviewed vital signs, intake and output, lab results and recent events, and agree with dialysis order  Tolerating HD  BP acceptable    Access, currently has upper extremity AV fistula    Anemia in CKD  -not on Epogen due to history of PE, DVT  -transfuse p r n  For hemoglobin less than seven    CKD BMD, continue to monitor phosphorus, PTH    Back pain  -was evaluated during last hospitalization with MRI, also by Neurosurgery/id  Was not felt to be related to diskitis or osteomyelitis  -neurosurgery follow-up, patient will need repeat MRI with gadolinium which is being scheduled for early Monday morning   -neurosurgery aware to coordinate this so patient can get dialysis after MRI on Monday  SUBJECTIVE:  Patient seen and examined at bedside  Denies any nausea, vomiting    Denies chest pain or shortness of breath    OBJECTIVE:  Current Weight: Weight - Scale: 130 kg (286 lb 9 6 oz)  Vitals:    07/30/22 0939   BP: (!) 150/33   Pulse: (!) 117   Resp: 18   Temp:    SpO2:        Intake/Output Summary (Last 24 hours) at 7/30/2022 0950  Last data filed at 7/30/2022 0900  Gross per 24 hour   Intake 200 ml   Output --   Net 200 ml     Wt Readings from Last 3 Encounters:   07/29/22 130 kg (286 lb 9 6 oz)   07/09/22 130 kg (285 lb 11 5 oz) 06/28/22 128 kg (282 lb 3 oz)     Temp Readings from Last 3 Encounters:   07/30/22 97 6 °F (36 4 °C) (Oral)   07/24/22 98 1 °F (36 7 °C) (Oral)   07/13/22 97 9 °F (36 6 °C)     BP Readings from Last 3 Encounters:   07/30/22 (!) 150/33   07/27/22 (!) 136/40   07/24/22 132/55     Pulse Readings from Last 3 Encounters:   07/30/22 (!) 117   07/27/22 79   07/24/22 77        Physical Examination:  General:  Lying in bed, no acute distress   Eyes:  Mild conjunctival pallor present  ENT:  External examination of ears and nose unremarkable  Neck:  No obvious lymphadenopathy appreciated  Respiratory:  Bilateral air entry present  CVS:  S1, S2 present  GI:  Soft, nondistended  CNS:  Active alert oriented x3  Skin:  No new rash  Musculoskeletal:  No obvious new gross deformity noted    Medications:    Current Facility-Administered Medications:     acetaminophen (TYLENOL) tablet 650 mg, 650 mg, Oral, Q6H PRN, Jeremy Bonilla MD, 650 mg at 07/29/22 1933    albuterol (PROVENTIL HFA,VENTOLIN HFA) inhaler 2 puff, 2 puff, Inhalation, Q6H PRN, Jeremy Bonilla MD    ALPRAZolam (XANAX) tablet 0 25 mg, 0 25 mg, Oral, BID PRN, Jeremy Bonilla MD, 0 25 mg at 07/30/22 0745    atorvastatin (LIPITOR) tablet 20 mg, 20 mg, Oral, QPM, Tez Ramirez MD, 20 mg at 07/29/22 1718    b complex-vitamin C-folic acid (NEPHROCAPS) capsule 1 capsule, 1 capsule, Oral, Daily With Darylene Mayo, MD, 1 capsule at 07/29/22 1718    carvedilol (COREG) tablet 25 mg, 25 mg, Oral, BID, Jeremy Bonilla MD    cinacalcet (SENSIPAR) tablet 30 mg, 30 mg, Oral, Daily, Jeremy Bonilla MD    Diclofenac Sodium (VOLTAREN) 1 % topical gel 2 g, 2 g, Topical, 4x Daily, LOIS Mills, 2 g at 07/30/22 0747    dicyclomine (BENTYL) capsule 10 mg, 10 mg, Oral, 4x Daily (AC & HS), Jeremy Bonilla MD, 10 mg at 07/30/22 3236    gabapentin (NEURONTIN) capsule 100 mg, 100 mg, Oral, Daily, Jeremy Bonilla MD    heparin (porcine) subcutaneous injection 5,000 Units, 5,000 Units, Subcutaneous, Q8H [address] **AND** Platelet count, , , Once, Triage Protocol Emergency, MD    insulin glargine (LANTUS) subcutaneous injection 20 Units 0 2 mL, 20 Units, Subcutaneous, HS, Sarah Cummins MD    insulin lispro (HumaLOG) 100 units/mL subcutaneous injection 1-5 Units, 1-5 Units, Subcutaneous, TID AC, 2 Units at 07/30/22 0743 **AND** Fingerstick Glucose (POCT), , , TID AC, Santo Ramirez MD    insulin lispro (HumaLOG) 100 units/mL subcutaneous injection 1-5 Units, 1-5 Units, Subcutaneous, HS, Santo Ramirez MD    insulin lispro (HumaLOG) 100 units/mL subcutaneous injection 5 Units, 5 Units, Subcutaneous, TID With Meals, Sarah Cummins MD, 5 Units at 07/30/22 0744    levothyroxine tablet 50 mcg, 50 mcg, Oral, Daily, Sarah Cummins MD    lidocaine (LIDODERM) 5 % patch 1 patch, 1 patch, Topical, Daily, Sarah Cummins MD    loratadine (CLARITIN) tablet 10 mg, 10 mg, Oral, Daily, Sarah Cummins MD    melatonin tablet 3 mg, 3 mg, Oral, HS, Sarah Cummins MD, 3 mg at 07/30/22 0002    NIFEdipine (PROCARDIA XL) 24 hr tablet 30 mg, 30 mg, Oral, Daily, Sarah Cummins MD    nystatin (MYCOSTATIN) powder, , Topical, BID, Sarah Cummins MD, Given at 07/29/22 1719    ondansetron (ZOFRAN) injection 4 mg, 4 mg, Intravenous, Q4H PRN, Sarah Cummins MD    oxyCODONE (ROXICODONE) immediate release tablet 10 mg, 10 mg, Oral, Q4H PRN, Sarah Cummins MD, 10 mg at 07/30/22 0542    oxyCODONE (ROXICODONE) IR tablet 5 mg, 5 mg, Oral, Q4H PRN, Sarah Cummins MD    pantoprazole (PROTONIX) EC tablet 40 mg, 40 mg, Oral, Early Morning, Sarah Cummins MD, 40 mg at 07/30/22 0542    polyethylene glycol (MIRALAX) packet 17 g, 17 g, Oral, Daily, Sarah Cummins MD    pregabalin (LYRICA) capsule 50 mg, 50 mg, Oral, Daily, Sarah Cummins MD    senna (SENOKOT) tablet 17 2 mg, 2 tablet, Oral, HS PRN, Sarah Cummins MD    sertraline (ZOLOFT) tablet 75 mg, 75 mg, Oral, Daily, Prem Chavez MD    sevelamer (RENAGEL) tablet 1,600 mg, 1,600 mg, Oral, TID With Meals, Prem Chavez MD, 1,600 mg at 07/30/22 0743    torsemide (DEMADEX) tablet 100 mg, 100 mg, Oral, Daily, Prem Chavez MD    warfarin (COUMADIN) tablet 9 mg, 9 mg, Oral, Daily (warfarin), Prem Chavez MD, 9 mg at 07/29/22 1718    Laboratory Results:  Results from last 7 days   Lab Units 07/30/22  0901 07/29/22  1218 07/24/22  2044   WBC Thousand/uL 5 16 4 67 5 43   HEMOGLOBIN g/dL 7 4* 7 5* 7 7*   HEMATOCRIT % 24 2* 23 8* 24 4*   PLATELETS Thousands/uL 112* 114* 161   SODIUM mmol/L 140 141 134*   POTASSIUM mmol/L 5 0 5 3 5 8*   CHLORIDE mmol/L 106 105 99   CO2 mmol/L 30 30 31   BUN mg/dL 53* 47* 53*   CREATININE mg/dL 8 21* 6 83* 8 36*   CALCIUM mg/dL 8 6 8 6 8 5       CT spine lumbar wo contrast   Final Result by Eleni Arevalo MD (07/29 1738)      Vacuum disc phenomenon at L4-L5 with endplate erosive change at this level as well as involving the facet joints at L4-L5 and L5-S1  Findings are stable dating back to prior examinations since 6/28/2022 with overall mild interval progression since    11/10/2021  Imaging findings in this patient with end-stage renal disease are 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 with infection to be considered less likely (given the presence of vacuum disc phenomenon and relative stability over    one month)  Interval follow-up with MRI is again recommended to ensure stability as previously suggested  Stable central disc extrusion is again noted at L4-L5 demonstrating cranial migration and results in overall mild to moderate canal stenosis  Heterogeneous appearance of the visualized osseous structures consistent with renal osteodystrophy        Workstation performed: [id number]         MRI inpatient order    (Results Pending)   MRI inpatient order    (Results Pending)       Portions of the record may have been created with voice recognition software  Occasional wrong word or "sound a like" substitutions may have occurred due to the inherent limitations of voice recognition software  Read the chart carefully and recognize, using context, where substitutions have occurred

## 2022-07-30 NOTE — PLAN OF CARE
Problem: PAIN - ADULT  Goal: Verbalizes/displays adequate comfort level or baseline comfort level  Description: Interventions:  - Encourage patient to monitor pain and request assistance  - Assess pain using appropriate pain scale  - Administer analgesics based on type and severity of pain and evaluate response  - Implement non-pharmacological measures as appropriate and evaluate response  - Consider cultural and social influences on pain and pain management  - Notify physician/advanced practitioner if interventions unsuccessful or patient reports new pain  Outcome: Progressing       Problem: INFECTION - ADULT  Goal: Absence or prevention of progression during hospitalization  Description: INTERVENTIONS:  - Assess and monitor for signs and symptoms of infection  - Monitor lab/diagnostic results  - Monitor all insertion sites, i e  indwelling lines, tubes, and drains  - Monitor endotracheal if appropriate and nasal secretions for changes in amount and color  - [place] appropriate cooling/warming therapies per order  - Administer medications as ordered  - Instruct and encourage patient and family to use good hand hygiene technique  - Identify and instruct in appropriate isolation precautions for identified infection/condition  Outcome: Progressing  Goal: Absence of fever/infection during neutropenic period  Description: INTERVENTIONS:  - Monitor WBC    Outcome: Progressing     Problem: Nutrition/Hydration-ADULT  Goal: Nutrient/Hydration intake appropriate for improving, restoring or maintaining nutritional needs  Description: Monitor and assess patient's nutrition/hydration status for malnutrition  Collaborate with interdisciplinary team and initiate plan and interventions as ordered  Monitor patient's weight and dietary intake as ordered or per policy  Utilize nutrition screening tool and intervene as necessary  Determine patient's food preferences and provide high-protein, high-caloric foods as appropriate  INTERVENTIONS:  - Monitor oral intake, urinary output, labs, and treatment plans  - Assess nutrition and hydration status and recommend course of action  - Evaluate amount of meals eaten  - Assist patient with eating if necessary   - Allow adequate time for meals  - Recommend/ encourage appropriate diets, oral nutritional supplements, and vitamin/mineral supplements  - Order, calculate, and assess calorie counts as needed  - Recommend, monitor, and adjust tube feedings and TPN/PPN based on assessed needs  - Assess need for intravenous fluids  - Provide specific nutrition/hydration education as appropriate  - Include patient/family/caregiver in decisions related to nutrition  Outcome: Progressing

## 2022-07-30 NOTE — PHYSICAL THERAPY NOTE
Physical Therapy Cancellation Note       07/30/22 0945   PT Last Visit   PT Visit Date 07/30/22   Note Type   Note type Evaluation; Cancelled Session   Cancel Reasons Other  (PT ON DIALYSIS - WILL CONT TO FOLLOW ON CASELOAD AND COMPLETE PT EVAL AS MEDICALLY INDICATED )

## 2022-07-30 NOTE — ASSESSMENT & PLAN NOTE
Lab Results   Component Value Date    HGBA1C 9 4 (H) 07/09/2022       Recent Labs     07/29/22  2150 07/29/22  2213 07/30/22  0726 07/30/22  1300   POCGLU 43* 72 224* 108       Blood Sugar Average: Last 72 hrs:  (P) 130 4     · With hypoglycemia overnight  · Decrease Lantus to 12 units  · Discontinue scheduled Humalog for now  · Continue SSI and glucose checks TID AC and QHS  · Hypoglycemia protocol

## 2022-07-30 NOTE — PLAN OF CARE
Post-Dialysis RN Treatment Note    Blood Pressure:  Pre 160/116  mm/Hg  Post  mmHg   EDW  124 kg    Weight:  Pre 128 5kg kg   Post 124 1 kg   Mode of weight measurement: Bed Scale   Volume Removed  3503 ml    Treatment duration 180 minutes    NS given  No    Treatment shortened? Yes, describe: 3 hour tx per pt request   Medications given during Rx  Oxycodone 10mg administered by prim   RN   Estimated Kt/V  1 07   Access type: AV fistula   Access Issues: No    Report called to primary nurse   Yes       3 hour HD tx per pt request, she is refusing do to 3 5 as ordered  2K acid bath  UF goal of 3L  Labs drawn prior to tx start, will adjust bath as necessary      Problem: METABOLIC, FLUID AND ELECTROLYTES - ADULT  Goal: Electrolytes maintained within normal limits  Description: INTERVENTIONS:  - Monitor labs and assess patient for signs and symptoms of electrolyte imbalances  - Administer electrolyte replacement as ordered  - Monitor response to electrolyte replacements, including repeat lab results as appropriate  - Instruct patient on fluid and nutrition as appropriate  Outcome: Progressing  Goal: Fluid balance maintained  Description: INTERVENTIONS:  - Monitor labs   - Monitor I/O and WT  - Instruct patient on fluid and nutrition as appropriate  - Assess for signs & symptoms of volume excess or deficit  Outcome: Progressing

## 2022-07-30 NOTE — ASSESSMENT & PLAN NOTE
· Patient complains of low back pain with bilateral lower extremity radiculopathy, right greater than left  · Patient was seen by ID and Neurosurgery during recent admission for abnormal MRI of the lumbar spine and concern for possible discitis/OM but was felt that this was unlikely  · Neurosurgery following this admission, appreciate their ongoing recommendations  · Plan for repeat MRI lumbar spine (likely late Sunday/early Monday per Neurosurgery consult given need for HD after MRI)  · CRP elevated but lower than last admission  · Pain control   · Follows with Pain Management as outpatient

## 2022-07-30 NOTE — ASSESSMENT & PLAN NOTE
Lab Results   Component Value Date    EGFR 4 07/30/2022    EGFR 6 07/29/2022    EGFR 4 07/24/2022    CREATININE 8 21 (H) 07/30/2022    CREATININE 6 83 (H) 07/29/2022    CREATININE 8 36 (H) 07/24/2022   · Nephrology following for management of dialysis

## 2022-07-31 ENCOUNTER — APPOINTMENT (INPATIENT)
Dept: RADIOLOGY | Facility: HOSPITAL | Age: 57
DRG: 551 | End: 2022-07-31
Payer: MEDICARE

## 2022-07-31 LAB
ANION GAP SERPL CALCULATED.3IONS-SCNC: 3 MMOL/L (ref 4–13)
BUN SERPL-MCNC: 30 MG/DL (ref 5–25)
CALCIUM SERPL-MCNC: 8.5 MG/DL (ref 8.3–10.1)
CHLORIDE SERPL-SCNC: 101 MMOL/L (ref 96–108)
CO2 SERPL-SCNC: 34 MMOL/L (ref 21–32)
CREAT SERPL-MCNC: 5.99 MG/DL (ref 0.6–1.3)
ERYTHROCYTE [DISTWIDTH] IN BLOOD BY AUTOMATED COUNT: 14.7 % (ref 11.6–15.1)
GFR SERPL CREATININE-BSD FRML MDRD: 7 ML/MIN/1.73SQ M
GLUCOSE SERPL-MCNC: 106 MG/DL (ref 65–140)
GLUCOSE SERPL-MCNC: 110 MG/DL (ref 65–140)
GLUCOSE SERPL-MCNC: 141 MG/DL (ref 65–140)
GLUCOSE SERPL-MCNC: 164 MG/DL (ref 65–140)
GLUCOSE SERPL-MCNC: 195 MG/DL (ref 65–140)
HCT VFR BLD AUTO: 23.5 % (ref 34.8–46.1)
HGB BLD-MCNC: 7.3 G/DL (ref 11.5–15.4)
INR PPP: 2.25 (ref 0.84–1.19)
MCH RBC QN AUTO: 31.5 PG (ref 26.8–34.3)
MCHC RBC AUTO-ENTMCNC: 31.1 G/DL (ref 31.4–37.4)
MCV RBC AUTO: 101 FL (ref 82–98)
PLATELET # BLD AUTO: 122 THOUSANDS/UL (ref 149–390)
PMV BLD AUTO: 11 FL (ref 8.9–12.7)
POTASSIUM SERPL-SCNC: 4.7 MMOL/L (ref 3.5–5.3)
PROTHROMBIN TIME: 25.1 SECONDS (ref 11.6–14.5)
RBC # BLD AUTO: 2.32 MILLION/UL (ref 3.81–5.12)
SODIUM SERPL-SCNC: 138 MMOL/L (ref 135–147)
WBC # BLD AUTO: 4.57 THOUSAND/UL (ref 4.31–10.16)

## 2022-07-31 PROCEDURE — A9585 GADOBUTROL INJECTION: HCPCS | Performed by: NURSE PRACTITIONER

## 2022-07-31 PROCEDURE — 72158 MRI LUMBAR SPINE W/O & W/DYE: CPT

## 2022-07-31 PROCEDURE — G1004 CDSM NDSC: HCPCS

## 2022-07-31 PROCEDURE — 85027 COMPLETE CBC AUTOMATED: CPT | Performed by: PHYSICIAN ASSISTANT

## 2022-07-31 PROCEDURE — 85610 PROTHROMBIN TIME: CPT | Performed by: PHYSICIAN ASSISTANT

## 2022-07-31 PROCEDURE — 82948 REAGENT STRIP/BLOOD GLUCOSE: CPT

## 2022-07-31 PROCEDURE — 80048 BASIC METABOLIC PNL TOTAL CA: CPT | Performed by: PHYSICIAN ASSISTANT

## 2022-07-31 PROCEDURE — 99232 SBSQ HOSP IP/OBS MODERATE 35: CPT | Performed by: PHYSICIAN ASSISTANT

## 2022-07-31 RX ORDER — LORAZEPAM 2 MG/ML
INJECTION INTRAMUSCULAR
Status: COMPLETED
Start: 2022-07-31 | End: 2022-07-31

## 2022-07-31 RX ORDER — LORAZEPAM 2 MG/ML
1 INJECTION INTRAMUSCULAR ONCE
Status: DISCONTINUED | OUTPATIENT
Start: 2022-07-31 | End: 2022-08-04 | Stop reason: HOSPADM

## 2022-07-31 RX ORDER — XYLITOL/YERBA SANTA
5 AEROSOL, SPRAY WITH PUMP (ML) MUCOUS MEMBRANE 4 TIMES DAILY PRN
Status: DISCONTINUED | OUTPATIENT
Start: 2022-07-31 | End: 2022-08-04 | Stop reason: HOSPADM

## 2022-07-31 RX ADMIN — HYDROMORPHONE HYDROCHLORIDE 0.2 MG: 0.2 INJECTION, SOLUTION INTRAMUSCULAR; INTRAVENOUS; SUBCUTANEOUS at 22:10

## 2022-07-31 RX ADMIN — OXYCODONE HYDROCHLORIDE 10 MG: 10 TABLET ORAL at 13:07

## 2022-07-31 RX ADMIN — CARVEDILOL 25 MG: 25 TABLET, FILM COATED ORAL at 16:55

## 2022-07-31 RX ADMIN — HYDROMORPHONE HYDROCHLORIDE 0.2 MG: 0.2 INJECTION, SOLUTION INTRAMUSCULAR; INTRAVENOUS; SUBCUTANEOUS at 15:10

## 2022-07-31 RX ADMIN — NIFEDIPINE 30 MG: 30 TABLET, FILM COATED, EXTENDED RELEASE ORAL at 08:51

## 2022-07-31 RX ADMIN — INSULIN GLARGINE 12 UNITS: 100 INJECTION, SOLUTION SUBCUTANEOUS at 21:22

## 2022-07-31 RX ADMIN — PREGABALIN 50 MG: 50 CAPSULE ORAL at 08:50

## 2022-07-31 RX ADMIN — LORATADINE 10 MG: 10 TABLET ORAL at 08:50

## 2022-07-31 RX ADMIN — WARFARIN SODIUM 9 MG: 3 TABLET ORAL at 18:42

## 2022-07-31 RX ADMIN — NEPHROCAP 1 CAPSULE: 1 CAP ORAL at 16:54

## 2022-07-31 RX ADMIN — OXYCODONE HYDROCHLORIDE 10 MG: 10 TABLET ORAL at 17:07

## 2022-07-31 RX ADMIN — LORAZEPAM 1 MG: 2 INJECTION INTRAMUSCULAR; INTRAVENOUS at 22:59

## 2022-07-31 RX ADMIN — MENTHOL, METHYL SALICYLATE: 10; 15 CREAM TOPICAL at 16:51

## 2022-07-31 RX ADMIN — OXYCODONE HYDROCHLORIDE 10 MG: 10 TABLET ORAL at 21:21

## 2022-07-31 RX ADMIN — HEPARIN SODIUM 5000 UNITS: 5000 INJECTION INTRAVENOUS; SUBCUTANEOUS at 06:38

## 2022-07-31 RX ADMIN — ALPRAZOLAM 0.25 MG: 0.25 TABLET ORAL at 10:53

## 2022-07-31 RX ADMIN — LEVOTHYROXINE SODIUM 50 MCG: 50 TABLET ORAL at 08:50

## 2022-07-31 RX ADMIN — DICYCLOMINE HYDROCHLORIDE 10 MG: 10 CAPSULE ORAL at 11:14

## 2022-07-31 RX ADMIN — ACETAMINOPHEN 975 MG: 325 TABLET ORAL at 13:07

## 2022-07-31 RX ADMIN — DICYCLOMINE HYDROCHLORIDE 10 MG: 10 CAPSULE ORAL at 06:38

## 2022-07-31 RX ADMIN — OXYCODONE HYDROCHLORIDE 10 MG: 10 TABLET ORAL at 04:41

## 2022-07-31 RX ADMIN — SEVELAMER HYDROCHLORIDE 1600 MG: 800 TABLET ORAL at 11:13

## 2022-07-31 RX ADMIN — MENTHOL, METHYL SALICYLATE: 10; 15 CREAM TOPICAL at 21:27

## 2022-07-31 RX ADMIN — DICYCLOMINE HYDROCHLORIDE 10 MG: 10 CAPSULE ORAL at 16:56

## 2022-07-31 RX ADMIN — CARVEDILOL 25 MG: 25 TABLET, FILM COATED ORAL at 08:50

## 2022-07-31 RX ADMIN — SEVELAMER HYDROCHLORIDE 1600 MG: 800 TABLET ORAL at 08:56

## 2022-07-31 RX ADMIN — HYDROMORPHONE HYDROCHLORIDE 0.2 MG: 0.2 INJECTION, SOLUTION INTRAMUSCULAR; INTRAVENOUS; SUBCUTANEOUS at 02:41

## 2022-07-31 RX ADMIN — OXYCODONE HYDROCHLORIDE 10 MG: 10 TABLET ORAL at 01:09

## 2022-07-31 RX ADMIN — SEVELAMER HYDROCHLORIDE 1600 MG: 800 TABLET ORAL at 16:54

## 2022-07-31 RX ADMIN — GABAPENTIN 100 MG: 100 CAPSULE ORAL at 08:50

## 2022-07-31 RX ADMIN — HYDROMORPHONE HYDROCHLORIDE 0.2 MG: 0.2 INJECTION, SOLUTION INTRAMUSCULAR; INTRAVENOUS; SUBCUTANEOUS at 06:40

## 2022-07-31 RX ADMIN — DICYCLOMINE HYDROCHLORIDE 10 MG: 10 CAPSULE ORAL at 21:22

## 2022-07-31 RX ADMIN — TORSEMIDE 100 MG: 20 TABLET ORAL at 08:50

## 2022-07-31 RX ADMIN — MENTHOL, METHYL SALICYLATE: 10; 15 CREAM TOPICAL at 08:59

## 2022-07-31 RX ADMIN — ATORVASTATIN CALCIUM 20 MG: 20 TABLET, FILM COATED ORAL at 16:55

## 2022-07-31 RX ADMIN — ACETAMINOPHEN 975 MG: 325 TABLET ORAL at 21:21

## 2022-07-31 RX ADMIN — SERTRALINE HYDROCHLORIDE 75 MG: 50 TABLET ORAL at 08:51

## 2022-07-31 RX ADMIN — PANTOPRAZOLE SODIUM 40 MG: 40 TABLET, DELAYED RELEASE ORAL at 06:38

## 2022-07-31 RX ADMIN — CINACALCET 30 MG: 30 TABLET, FILM COATED ORAL at 08:51

## 2022-07-31 RX ADMIN — HEPARIN SODIUM 5000 UNITS: 5000 INJECTION INTRAVENOUS; SUBCUTANEOUS at 21:22

## 2022-07-31 RX ADMIN — GADOBUTROL 12 ML: 604.72 INJECTION INTRAVENOUS at 23:07

## 2022-07-31 RX ADMIN — ACETAMINOPHEN 975 MG: 325 TABLET ORAL at 06:38

## 2022-07-31 RX ADMIN — INSULIN LISPRO 1 UNITS: 100 INJECTION, SOLUTION INTRAVENOUS; SUBCUTANEOUS at 16:57

## 2022-07-31 RX ADMIN — LIDOCAINE 5% 1 PATCH: 700 PATCH TOPICAL at 08:51

## 2022-07-31 RX ADMIN — HYDROMORPHONE HYDROCHLORIDE 0.2 MG: 0.2 INJECTION, SOLUTION INTRAMUSCULAR; INTRAVENOUS; SUBCUTANEOUS at 10:53

## 2022-07-31 RX ADMIN — Medication 3 MG: at 21:21

## 2022-07-31 RX ADMIN — OXYCODONE HYDROCHLORIDE 10 MG: 10 TABLET ORAL at 08:51

## 2022-07-31 NOTE — ASSESSMENT & PLAN NOTE
· Patient complains of low back pain with bilateral lower extremity radiculopathy, right greater than left  · Patient was seen by ID and Neurosurgery during recent admission for abnormal MRI of the lumbar spine and concern for possible discitis/OM but was felt that this was unlikely  · Neurosurgery following this admission, appreciate their ongoing recommendations  · Plan for repeat MRI lumbar spine (likely late Sunday/early Monday per Neurosurgery consult given need for HD after MRI)  · CRP elevated but lower than last admission  · Patient on multimodal pain regimen   Will appreciate APS consult   · Follows with Pain Management as outpatient

## 2022-07-31 NOTE — ASSESSMENT & PLAN NOTE
Lab Results   Component Value Date    HGBA1C 9 4 (H) 07/09/2022       Recent Labs     07/30/22  1601 07/30/22  2055 07/31/22  0754 07/31/22  1103   POCGLU 145* 237* 141* 106       Blood Sugar Average: Last 72 hrs:  (P) 324 [id number]   · Continue Lantus 12 units  · Continue SSI and glucose checks TID AC and QHS  · Hypoglycemia protocol

## 2022-07-31 NOTE — PLAN OF CARE
Problem: Potential for Falls  Goal: Patient will remain free of falls  Description: INTERVENTIONS:  - Educate patient/family on patient safety including physical limitations  - Instruct patient to call for assistance with activity   - Consult OT/PT to assist with strengthening/mobility   - Keep Call bell within reach  - Keep bed low and locked with side rails adjusted as appropriate  - Keep care items and personal belongings within reach  - Initiate and maintain comfort rounds  - Make Fall Risk Sign visible to staff  - Offer Toileting every 2 Hours, in advance of need  - Initiate/Maintain alarm  - Obtain necessary fall risk management equipment  - Apply yellow socks and bracelet for high fall risk patients  - Consider moving patient to room near nurses station  Outcome: Progressing     Problem: PAIN - ADULT  Goal: Verbalizes/displays adequate comfort level or baseline comfort level  Description: Interventions:  - Encourage patient to monitor pain and request assistance  - Assess pain using appropriate pain scale  - Administer analgesics based on type and severity of pain and evaluate response  - Implement non-pharmacological measures as appropriate and evaluate response  - Consider cultural and social influences on pain and pain management  - Notify physician/advanced practitioner if interventions unsuccessful or patient reports new pain  Outcome: Progressing     Problem: INFECTION - ADULT  Goal: Absence or prevention of progression during hospitalization  Description: INTERVENTIONS:  - Assess and monitor for signs and symptoms of infection  - Monitor lab/diagnostic results  - Monitor all insertion sites, i e  indwelling lines, tubes, and drains  - Monitor endotracheal if appropriate and nasal secretions for changes in amount and color  - [place] appropriate cooling/warming therapies per order  - Administer medications as ordered  - Instruct and encourage patient and family to use good hand hygiene technique  - Identify and instruct in appropriate isolation precautions for identified infection/condition  Outcome: Progressing  Goal: Absence of fever/infection during neutropenic period  Description: INTERVENTIONS:  - Monitor WBC    Outcome: Progressing     Problem: SAFETY ADULT  Goal: Patient will remain free of falls  Description: INTERVENTIONS:  - Educate patient/family on patient safety including physical limitations  - Instruct patient to call for assistance with activity   - Consult OT/PT to assist with strengthening/mobility   - Keep Call bell within reach  - Keep bed low and locked with side rails adjusted as appropriate  - Keep care items and personal belongings within reach  - Initiate and maintain comfort rounds  - Make Fall Risk Sign visible to staff  - Offer Toileting every 2 Hours, in advance of need  - Initiate/Maintain alarm  - Obtain necessary fall risk management equipment  - Apply yellow socks and bracelet for high fall risk patients  - Consider moving patient to room near nurses station  Outcome: Progressing  Goal: Maintain or return to baseline ADL function  Description: INTERVENTIONS:  -  Assess patient's ability to carry out ADLs; assess patient's baseline for ADL function and identify physical deficits which impact ability to perform ADLs (bathing, care of mouth/teeth, toileting, grooming, dressing, etc )  - Assess/evaluate cause of self-care deficits   - Assess range of motion  - Assess patient's mobility; develop plan if impaired  - Assess patient's need for assistive devices and provide as appropriate  - Encourage maximum independence but intervene and supervise when necessary  - Involve family in performance of ADLs  - Assess for home care needs following discharge   - Consider OT consult to assist with ADL evaluation and planning for discharge  - Provide patient education as appropriate  Outcome: Progressing  Goal: Maintains/Returns to pre admission functional level  Description: INTERVENTIONS:  - Perform BMAT or MOVE assessment daily    - Set and communicate daily mobility goal to care team and patient/family/caregiver  - Collaborate with rehabilitation services on mobility goals if consulted  - Perform Range of Motion 3 times a day  - Reposition patient every 2 hours  - Dangle patient 3 times a day  - Stand patient 3 times a day  - Ambulate patient 3 times a day  - Out of bed to chair 3 times a day   - Out of bed for meals 3 times a day  - Out of bed for toileting  - Record patient progress and toleration of activity level   Outcome: Progressing     Problem: DISCHARGE PLANNING  Goal: Discharge to home or other facility with appropriate resources  Description: INTERVENTIONS:  - Identify barriers to discharge w/patient and caregiver  - Arrange for needed discharge resources and transportation as appropriate  - Identify discharge learning needs (meds, wound care, etc )  - Arrange for interpretive services to assist at discharge as needed  - Refer to Case Management Department for coordinating discharge planning if the patient needs post-hospital services based on physician/advanced practitioner order or complex needs related to functional status, cognitive ability, or social support system  Outcome: Progressing     Problem: Knowledge Deficit  Goal: Patient/family/caregiver demonstrates understanding of disease process, treatment plan, medications, and discharge instructions  Description: Complete learning assessment and assess knowledge base    Interventions:  - Provide teaching at level of understanding  - Provide teaching via preferred learning methods  Outcome: Progressing     Problem: MOBILITY - ADULT  Goal: Maintain or return to baseline ADL function  Description: INTERVENTIONS:  -  Assess patient's ability to carry out ADLs; assess patient's baseline for ADL function and identify physical deficits which impact ability to perform ADLs (bathing, care of mouth/teeth, toileting, grooming, dressing, etc )  - Assess/evaluate cause of self-care deficits   - Assess range of motion  - Assess patient's mobility; develop plan if impaired  - Assess patient's need for assistive devices and provide as appropriate  - Encourage maximum independence but intervene and supervise when necessary  - Involve family in performance of ADLs  - Assess for home care needs following discharge   - Consider OT consult to assist with ADL evaluation and planning for discharge  - Provide patient education as appropriate  Outcome: Progressing  Goal: Maintains/Returns to pre admission functional level  Description: INTERVENTIONS:  - Perform BMAT or MOVE assessment daily    - Set and communicate daily mobility goal to care team and patient/family/caregiver  - Collaborate with rehabilitation services on mobility goals if consulted  - Perform Range of Motion 3 times a day  - Reposition patient every 2 hours  - Dangle patient 3 times a day  - Stand patient 3 times a day  - Ambulate patient 3 times a day  - Out of bed to chair 3 times a day   - Out of bed for meals 3 times a day  - Out of bed for toileting  - Record patient progress and toleration of activity level   Outcome: Progressing     Problem: Nutrition/Hydration-ADULT  Goal: Nutrient/Hydration intake appropriate for improving, restoring or maintaining nutritional needs  Description: Monitor and assess patient's nutrition/hydration status for malnutrition  Collaborate with interdisciplinary team and initiate plan and interventions as ordered  Monitor patient's weight and dietary intake as ordered or per policy  Utilize nutrition screening tool and intervene as necessary  Determine patient's food preferences and provide high-protein, high-caloric foods as appropriate       INTERVENTIONS:  - Monitor oral intake, urinary output, labs, and treatment plans  - Assess nutrition and hydration status and recommend course of action  - Evaluate amount of meals eaten  - Assist patient with eating if necessary   - Allow adequate time for meals  - Recommend/ encourage appropriate diets, oral nutritional supplements, and vitamin/mineral supplements  - Order, calculate, and assess calorie counts as needed  - Recommend, monitor, and adjust tube feedings and TPN/PPN based on assessed needs  - Assess need for intravenous fluids  - Provide specific nutrition/hydration education as appropriate  - Include patient/family/caregiver in decisions related to nutrition  Outcome: Progressing     Problem: METABOLIC, FLUID AND ELECTROLYTES - ADULT  Goal: Electrolytes maintained within normal limits  Description: INTERVENTIONS:  - Monitor labs and assess patient for signs and symptoms of electrolyte imbalances  - Administer electrolyte replacement as ordered  - Monitor response to electrolyte replacements, including repeat lab results as appropriate  - Instruct patient on fluid and nutrition as appropriate  Outcome: Progressing  Goal: Fluid balance maintained  Description: INTERVENTIONS:  - Monitor labs   - Monitor I/O and WT  - Instruct patient on fluid and nutrition as appropriate  - Assess for signs & symptoms of volume excess or deficit  Outcome: Progressing

## 2022-07-31 NOTE — PHYSICAL THERAPY NOTE
Physical Therapy Cancellation Note    Patient's Name: Damion Thompson       07/31/22 0801   Note Type   Note type Cancelled Session; Evaluation   Cancel Reasons Other   Additional Comments Pt admit with intractable back pain  MRI pending  Will follow for PT evaluation when medically appropriate  Thank you         Jennifer Centers, PT, DPT, GCS

## 2022-07-31 NOTE — ASSESSMENT & PLAN NOTE
Lab Results   Component Value Date    EGFR 7 07/31/2022    EGFR 4 07/30/2022    EGFR 6 07/29/2022    CREATININE 5 99 (H) 07/31/2022    CREATININE 8 21 (H) 07/30/2022    CREATININE 6 83 (H) 07/29/2022   · Nephrology following for management of dialysis

## 2022-07-31 NOTE — PROGRESS NOTES
[address] [facility]  Progress Note Haseeb Olivas 1965, 56 y o  female MRN: [id number]  Unit/Bed#: [facility] 814-01 Encounter: [id number]  Primary Care Provider: Devendra Umana MD   Date and time admitted to hospital: 7/29/2022 11:10 AM    * Intractable back pain  Assessment & Plan  · Patient complains of low back pain with bilateral lower extremity radiculopathy, right greater than left  · Patient was seen by ID and Neurosurgery during recent admission for abnormal MRI of the lumbar spine and concern for possible discitis/OM but was felt that this was unlikely  · Neurosurgery following this admission, appreciate their ongoing recommendations  · Plan for repeat MRI lumbar spine (likely late Sunday/early Monday per Neurosurgery consult given need for HD after MRI)  · CRP elevated but lower than last admission  · Patient on multimodal pain regimen  Will appreciate APS consult   · Follows with Pain Management as outpatient     ESRD on hemodialysis  Assessment & Plan  Lab Results   Component Value Date    EGFR 7 07/31/2022    EGFR 4 07/30/2022    EGFR 6 07/29/2022    CREATININE 5 99 (H) 07/31/2022    CREATININE 8 21 (H) 07/30/2022    CREATININE 6 83 (H) 07/29/2022   · Nephrology following for management of dialysis    History of venous thromboembolism  Assessment & Plan  · Continue Coumadin  · Monitor INR    Type 2 diabetes mellitus [facility])  Assessment & Plan  Lab Results   Component Value Date    HGBA1C 9 4 (H) 07/09/2022       Recent Labs     07/30/22  1601 07/30/22  2055 07/31/22  0754 07/31/22  1103   POCGLU 145* 237* 141* 106       Blood Sugar Average: Last 72 hrs:  (P) 147 [id number]   · Continue Lantus 12 units  · Continue SSI and glucose checks TID AC and QHS  · Hypoglycemia protocol     Essential hypertension  Assessment & Plan  · Continue Coreg    Morbid obesity  Assessment & Plan  Body mass index is 46 26 kg/m²    · Encourage dietary and lifestyle modifications         VTE Pharmacologic Prophylaxis:   coumadin as above    Patient Centered Rounds: I performed bedside rounds with nursing staff today  d/w RN  Discussions with Specialists or Other Care Team Provider:     Education and Discussions with Family / Patient: patient  Time Spent for Care: 20 minutes  More than 50% of total time spent on counseling and coordination of care as described above  Current Length of Stay: 1 day(s)  Current Patient Status: Inpatient   Certification Statement: The patient will continue to require additional inpatient hospital stay due to pending work up as above including MRI, pain control  Discharge Plan: pending MRI, neurosurgery recs and pain control     Code Status: Level 1 - Full Code    Subjective:   Ms Lavona Hatchet reports low back pain radiating down right leg, worse with movement and worst first thing in the morning when she gets up    Objective:     Vitals:   Temp (24hrs), Av 2 °F (36 8 °C), Min:98 °F (36 7 °C), Max:98 6 °F (37 °C)    Temp:  [98 °F (36 7 °C)-98 6 °F (37 °C)] 98 °F (36 7 °C)  HR:  [60] 60  Resp:  [16-20] 16  BP: (101-141)/(47-88) 141/68  SpO2:  [96 %] 96 %  Body mass index is 46 26 kg/m²  Input and Output Summary (last 24 hours): Intake/Output Summary (Last 24 hours) at 2022 1426  Last data filed at 2022 1349  Gross per 24 hour   Intake 600 ml   Output --   Net 600 ml       Physical Exam:   Physical Exam  Vitals and nursing note reviewed  Constitutional:       General: She is not in acute distress  Appearance: She is obese  She is not ill-appearing or diaphoretic  Cardiovascular:      Rate and Rhythm: Normal rate and regular rhythm  Pulmonary:      Effort: Pulmonary effort is normal  No respiratory distress  Breath sounds: Normal breath sounds  Abdominal:      General: Bowel sounds are normal       Palpations: Abdomen is soft  Tenderness: There is no abdominal tenderness  Musculoskeletal:      Right lower leg: No edema        Left lower leg: No edema  Skin:     General: Skin is warm  Neurological:      Mental Status: She is alert and oriented to person, place, and time  Psychiatric:         Mood and Affect: Mood normal          Behavior: Behavior normal           Additional Data:     Labs:  Results from last 7 days   Lab Units 07/31/22  0546 07/30/22  0901 07/29/22  1218   WBC Thousand/uL 4 57   < > 4 67   HEMOGLOBIN g/dL 7 3*   < > 7 5*   HEMATOCRIT % 23 5*   < > 23 8*   PLATELETS Thousands/uL 122*   < > 114*   NEUTROS PCT %  --   --  68   LYMPHS PCT %  --   --  17   MONOS PCT %  --   --  12   EOS PCT %  --   --  2    < > = values in this interval not displayed  Results from last 7 days   Lab Units 07/31/22  0546   SODIUM mmol/L 138   POTASSIUM mmol/L 4 7   CHLORIDE mmol/L 101   CO2 mmol/L 34*   BUN mg/dL 30*   CREATININE mg/dL 5 99*   ANION GAP mmol/L 3*   CALCIUM mg/dL 8 5   GLUCOSE RANDOM mg/dL 164*     Results from last 7 days   Lab Units 07/31/22  0546   INR  2 25*     Results from last 7 days   Lab Units 07/31/22  1103 07/31/22  0754 07/30/22  2055 07/30/22  1601 07/30/22  1300 07/30/22  0726 07/29/22  2213 07/29/22  2150 07/29/22  1643   POC GLUCOSE mg/dl 106 141* 237* 145* 108 224* 72 43* 205*               Lines/Drains:  Invasive Devices  Report    Peripheral Intravenous Line  Duration           Peripheral IV 07/30/22 Dorsal (posterior); Right Hand <1 day          Line  Duration           Hemodialysis AV Fistula 02/20/18 Left Forearm 1621 days                      Imaging: No pertinent imaging reviewed  Recent Cultures (last 7 days):   Results from last 7 days   Lab Units 07/29/22  1218   BLOOD CULTURE  No Growth at 24 hrs  No Growth at 24 hrs         Last 24 Hours Medication List:   Current Facility-Administered Medications   Medication Dose Route Frequency Provider Last Rate    acetaminophen  975 mg Oral Q8H [facility] Shaunna Mendiola PA-C      albuterol  2 puff Inhalation Q6H PRN Sonu De La Rosa MD      ALPRAZolam  0 25 mg Oral BID PRN Barb Powell MD      atorvastatin  20 mg Oral QPM Barb Powell MD      b complex-vitamin C-folic acid  1 capsule Oral Daily With Ramsey Baer MD      carvedilol  25 mg Oral BID Barb Powell MD      cinacalcet  30 mg Oral Daily Barb Powell MD      Diclofenac Sodium  2 g Topical 4x Daily [place]IS [place]      dicyclomine  10 mg Oral 4x Daily (AC & HS) Barb Powell MD      gabapentin  100 mg Oral Daily Barb Powell MD      heparin (porcine)  5,000 Units Subcutaneous Q8H [address] Triage Protocol Emergency, MD      HYDROmorphone  0 2 mg Intravenous Q4H PRN RALPH Candelaria-JASON      insulin glargine  12 Units Subcutaneous HS Zoran Oquendo PA-C      insulin lispro  1-5 Units Subcutaneous TID AC Barb Powell MD      insulin lispro  1-5 Units Subcutaneous HS Barb Powell MD      levothyroxine  50 mcg Oral Daily Barb Powell MD      lidocaine  1 patch Topical Daily Barb Powell MD      loratadine  10 mg Oral Daily Barb Powell MD      melatonin  3 mg Oral HS Barb Powell MD      menthol-methyl salicylate   Apply externally 4x Daily PRN Bhaskar Goff MD      NIFEdipine  30 mg Oral Daily Barb Powell MD      nystatin   Topical BID Barb Powell MD      ondansetron  4 mg Intravenous Q4H PRN Barb Powell MD      oxyCODONE  10 mg Oral Q4H PRN Barb Powell MD      oxyCODONE  5 mg Oral Q4H PRN Barb Powell MD      pantoprazole  40 mg Oral Early Morning Barb Powell MD      polyethylene glycol  17 g Oral Daily Barb Powell MD      pregabalin  50 mg Oral Daily Barb Powell MD      saliva substitute  5 spray Mouth/Throat 4x Daily PRN [address], LOIS      senna  2 tablet Oral HS PRN Barb Powell MD      sertraline  75 mg Oral Daily Barb Powell MD      sevelamer  1,600 mg Oral TID With Meals Barb Powell MD      torsemide  100 mg Oral Daily Barb Powell MD      warfarin  9 mg Oral Daily (warfarin) Kain Rodriguez MD          Today, Patient Was Seen By: Jeancarlos Jay PA-C    **Please Note: This note may have been constructed using a voice recognition system  **

## 2022-08-01 ENCOUNTER — APPOINTMENT (INPATIENT)
Dept: DIALYSIS | Facility: HOSPITAL | Age: 57
DRG: 551 | End: 2022-08-01
Attending: INTERNAL MEDICINE
Payer: MEDICARE

## 2022-08-01 ENCOUNTER — TELEPHONE (OUTPATIENT)
Dept: NEUROSURGERY | Facility: CLINIC | Age: 57
End: 2022-08-01

## 2022-08-01 ENCOUNTER — APPOINTMENT (INPATIENT)
Dept: RADIOLOGY | Facility: HOSPITAL | Age: 57
DRG: 551 | End: 2022-08-01
Payer: MEDICARE

## 2022-08-01 LAB
7AMINOCLONAZEPAM SAL QL CFM: NEGATIVE NG/ML
AMPHET SAL QL CFM: NEGATIVE NG/ML
ANION GAP SERPL CALCULATED.3IONS-SCNC: 6 MMOL/L (ref 4–13)
BUN SERPL-MCNC: 39 MG/DL (ref 5–25)
BUPRENORPHINE SAL QL SCN: NEGATIVE NG/ML
CALCIUM SERPL-MCNC: 8.5 MG/DL (ref 8.3–10.1)
CARBOXYTHC SAL QL CFM: ABNORMAL NG/ML
CCP IGG SERPL-ACNC: NEGATIVE
CHLORIDE SERPL-SCNC: 101 MMOL/L (ref 96–108)
CO2 SERPL-SCNC: 31 MMOL/L (ref 21–32)
COCAINE SAL QL CFM: NEGATIVE NG/ML
CODEINE SAL QL CFM: NEGATIVE NG/ML
CREAT SERPL-MCNC: 7.92 MG/DL (ref 0.6–1.3)
EDDP SAL QL CFM: NEGATIVE NG/ML
ERYTHROCYTE [DISTWIDTH] IN BLOOD BY AUTOMATED COUNT: 14.6 % (ref 11.6–15.1)
GFR SERPL CREATININE-BSD FRML MDRD: 5 ML/MIN/1.73SQ M
GLUCOSE SERPL-MCNC: 111 MG/DL (ref 65–140)
GLUCOSE SERPL-MCNC: 115 MG/DL (ref 65–140)
GLUCOSE SERPL-MCNC: 144 MG/DL (ref 65–140)
GLUCOSE SERPL-MCNC: 84 MG/DL (ref 65–140)
GLUCOSE SERPL-MCNC: 87 MG/DL (ref 65–140)
HCT VFR BLD AUTO: 25.4 % (ref 34.8–46.1)
HGB BLD-MCNC: 8.1 G/DL (ref 11.5–15.4)
HYDROCODONE SAL QL CFM: NEGATIVE NG/ML
HYDROCODONE SAL QL CFM: NEGATIVE NG/ML
HYDROMORPHONE SAL QL CFM: NEGATIVE NG/ML
INR PPP: 2.44 (ref 0.84–1.19)
LEUKEMIA MARKERS BLD-IMP: NEGATIVE NG/ML
M PROTEIN 3 UR ELPH-MCNC: ABNORMAL NG/ML
M TB TUBERC IGNF/MITOGEN IGNF CONTROL: NEGATIVE NG/ML
MCH RBC QN AUTO: 31.6 PG (ref 26.8–34.3)
MCHC RBC AUTO-ENTMCNC: 31.9 G/DL (ref 31.4–37.4)
MCV RBC AUTO: 99 FL (ref 82–98)
METHADONE SAL QL CFM: NEGATIVE NG/ML
MORPHINE SAL QL CFM: NEGATIVE NG/ML
MORPHINE SAL QL CFM: NEGATIVE NG/ML
OXYMORPHONE SAL QL CFM: NEGATIVE NG/ML
OXYMORPHONE SAL QL CFM: NEGATIVE NG/ML
PCP SAL QL CFM: NEGATIVE NG/ML
PLATELET # BLD AUTO: 140 THOUSANDS/UL (ref 149–390)
PMV BLD AUTO: 11.1 FL (ref 8.9–12.7)
POTASSIUM SERPL-SCNC: 5.3 MMOL/L (ref 3.5–5.3)
PROTHROMBIN TIME: 26.8 SECONDS (ref 11.6–14.5)
RBC # BLD AUTO: 2.56 MILLION/UL (ref 3.81–5.12)
RESULT ALL_PRESCRIBED MEDS AND SPECIAL INSTRUCTIONS: NORMAL
SL AMB 6-MAM (HEROIN METABOLITE) QUANTIFICATION: NEGATIVE NG/ML
SL AMB ALPRAZOLAM QUANTIFICATION: ABNORMAL NG/ML
SL AMB ATOMOXETINE QUANTIFICATION: NORMAL
SL AMB CLONAZEPAM QUANTIFICATION: NEGATIVE NG/ML
SL AMB DIAZEPAM QUANTIFICATION: NEGATIVE NG/ML
SL AMB FENTANYL QUANTIFICATION: NEGATIVE NG/ML
SL AMB MDMA QUANTIFICATION: NEGATIVE NG/ML
SL AMB N-DESMETHYL-TRAMADOL QUANTIFICATION SALIVA: NEGATIVE NG/ML
SL AMB NORBUPRENORPHINE QUANTIFICATION: NEGATIVE NG/ML
SL AMB NORDIAZEPAM QUANTIFICATION: NEGATIVE NG/ML
SL AMB NORFENTANYL QUANTIFICATION: NEGATIVE NG/ML
SL AMB NORHYDROCODONE QUANTIFICATION: NEGATIVE NG/ML
SL AMB NORHYDROCODONE QUANTIFICATION: NEGATIVE NG/ML
SL AMB NORMEPERIDINE QUANTIFICATION: NEGATIVE NG/ML
SL AMB NOROXYCODONE QUANTIFICATION: NEGATIVE NG/ML
SL AMB OXAZEPAM QUANTIFICATION: NEGATIVE NG/ML
SL AMB RITALINIC ACID QUANTIFICATION: NEGATIVE
SL AMB TEMAZEPAM QUANTIFICATION: NEGATIVE NG/ML
SL AMB TEMAZEPAM QUANTIFICATION: NEGATIVE NG/ML
SL AMB TRAMADOL QUANTIFICATION: NEGATIVE NG/ML
SODIUM SERPL-SCNC: 138 MMOL/L (ref 135–147)
SQUAMOUS #/AREA URNS HPF: NEGATIVE NG/ML
WBC # BLD AUTO: 4.89 THOUSAND/UL (ref 4.31–10.16)

## 2022-08-01 PROCEDURE — 85027 COMPLETE CBC AUTOMATED: CPT | Performed by: PHYSICIAN ASSISTANT

## 2022-08-01 PROCEDURE — 80048 BASIC METABOLIC PNL TOTAL CA: CPT | Performed by: PHYSICIAN ASSISTANT

## 2022-08-01 PROCEDURE — 5A1D70Z PERFORMANCE OF URINARY FILTRATION, INTERMITTENT, LESS THAN 6 HOURS PER DAY: ICD-10-PCS | Performed by: INTERNAL MEDICINE

## 2022-08-01 PROCEDURE — 90935 HEMODIALYSIS ONE EVALUATION: CPT | Performed by: INTERNAL MEDICINE

## 2022-08-01 PROCEDURE — 72100 X-RAY EXAM L-S SPINE 2/3 VWS: CPT

## 2022-08-01 PROCEDURE — 85610 PROTHROMBIN TIME: CPT | Performed by: PHYSICIAN ASSISTANT

## 2022-08-01 PROCEDURE — 99223 1ST HOSP IP/OBS HIGH 75: CPT | Performed by: INTERNAL MEDICINE

## 2022-08-01 PROCEDURE — 82948 REAGENT STRIP/BLOOD GLUCOSE: CPT

## 2022-08-01 PROCEDURE — 99232 SBSQ HOSP IP/OBS MODERATE 35: CPT | Performed by: PHYSICIAN ASSISTANT

## 2022-08-01 RX ORDER — OXYCODONE HYDROCHLORIDE AND ACETAMINOPHEN 5; 325 MG/1; MG/1
1 TABLET ORAL EVERY 6 HOURS PRN
Status: DISCONTINUED | OUTPATIENT
Start: 2022-08-01 | End: 2022-08-04 | Stop reason: HOSPADM

## 2022-08-01 RX ORDER — OXYCODONE HYDROCHLORIDE AND ACETAMINOPHEN 5; 325 MG/1; MG/1
2 TABLET ORAL EVERY 6 HOURS PRN
Status: DISCONTINUED | OUTPATIENT
Start: 2022-08-01 | End: 2022-08-04 | Stop reason: HOSPADM

## 2022-08-01 RX ORDER — SENNOSIDES 8.6 MG
2 TABLET ORAL
Status: DISCONTINUED | OUTPATIENT
Start: 2022-08-01 | End: 2022-08-04 | Stop reason: HOSPADM

## 2022-08-01 RX ADMIN — SEVELAMER HYDROCHLORIDE 1600 MG: 800 TABLET ORAL at 17:13

## 2022-08-01 RX ADMIN — ALPRAZOLAM 0.25 MG: 0.25 TABLET ORAL at 23:29

## 2022-08-01 RX ADMIN — MENTHOL, METHYL SALICYLATE 1 APPLICATION: 10; 15 CREAM TOPICAL at 23:32

## 2022-08-01 RX ADMIN — DICYCLOMINE HYDROCHLORIDE 10 MG: 10 CAPSULE ORAL at 17:16

## 2022-08-01 RX ADMIN — OXYCODONE HYDROCHLORIDE 10 MG: 10 TABLET ORAL at 04:19

## 2022-08-01 RX ADMIN — INSULIN GLARGINE 12 UNITS: 100 INJECTION, SOLUTION SUBCUTANEOUS at 22:38

## 2022-08-01 RX ADMIN — NEPHROCAP 1 CAPSULE: 1 CAP ORAL at 17:14

## 2022-08-01 RX ADMIN — PANTOPRAZOLE SODIUM 40 MG: 40 TABLET, DELAYED RELEASE ORAL at 06:08

## 2022-08-01 RX ADMIN — GABAPENTIN 100 MG: 100 CAPSULE ORAL at 12:26

## 2022-08-01 RX ADMIN — CARVEDILOL 25 MG: 25 TABLET, FILM COATED ORAL at 12:26

## 2022-08-01 RX ADMIN — ACETAMINOPHEN 975 MG: 325 TABLET ORAL at 06:08

## 2022-08-01 RX ADMIN — SENNOSIDES 17.2 MG: 8.6 TABLET, FILM COATED ORAL at 22:38

## 2022-08-01 RX ADMIN — ONDANSETRON 4 MG: 2 INJECTION INTRAMUSCULAR; INTRAVENOUS at 14:51

## 2022-08-01 RX ADMIN — HYDROMORPHONE HYDROCHLORIDE 0.2 MG: 0.2 INJECTION, SOLUTION INTRAMUSCULAR; INTRAVENOUS; SUBCUTANEOUS at 18:18

## 2022-08-01 RX ADMIN — LIDOCAINE 5% 1 PATCH: 700 PATCH TOPICAL at 12:26

## 2022-08-01 RX ADMIN — DICYCLOMINE HYDROCHLORIDE 10 MG: 10 CAPSULE ORAL at 22:38

## 2022-08-01 RX ADMIN — HEPARIN SODIUM 5000 UNITS: 5000 INJECTION INTRAVENOUS; SUBCUTANEOUS at 06:08

## 2022-08-01 RX ADMIN — TORSEMIDE 100 MG: 20 TABLET ORAL at 12:26

## 2022-08-01 RX ADMIN — HYDROMORPHONE HYDROCHLORIDE 0.2 MG: 0.2 INJECTION, SOLUTION INTRAMUSCULAR; INTRAVENOUS; SUBCUTANEOUS at 13:58

## 2022-08-01 RX ADMIN — LORATADINE 10 MG: 10 TABLET ORAL at 12:26

## 2022-08-01 RX ADMIN — PREGABALIN 50 MG: 50 CAPSULE ORAL at 12:25

## 2022-08-01 RX ADMIN — OXYCODONE HYDROCHLORIDE AND ACETAMINOPHEN 2 TABLET: 5; 325 TABLET ORAL at 23:29

## 2022-08-01 RX ADMIN — ONDANSETRON 4 MG: 2 INJECTION INTRAMUSCULAR; INTRAVENOUS at 23:39

## 2022-08-01 RX ADMIN — OXYCODONE HYDROCHLORIDE AND ACETAMINOPHEN 2 TABLET: 5; 325 TABLET ORAL at 17:14

## 2022-08-01 RX ADMIN — Medication 3 MG: at 22:38

## 2022-08-01 RX ADMIN — CINACALCET 30 MG: 30 TABLET, FILM COATED ORAL at 12:25

## 2022-08-01 RX ADMIN — ACETAMINOPHEN 975 MG: 325 TABLET ORAL at 13:57

## 2022-08-01 RX ADMIN — ALPRAZOLAM 0.25 MG: 0.25 TABLET ORAL at 08:08

## 2022-08-01 RX ADMIN — DICYCLOMINE HYDROCHLORIDE 10 MG: 10 CAPSULE ORAL at 12:26

## 2022-08-01 RX ADMIN — SEVELAMER HYDROCHLORIDE 1600 MG: 800 TABLET ORAL at 12:25

## 2022-08-01 RX ADMIN — ATORVASTATIN CALCIUM 20 MG: 20 TABLET, FILM COATED ORAL at 17:13

## 2022-08-01 RX ADMIN — LEVOTHYROXINE SODIUM 50 MCG: 50 TABLET ORAL at 06:08

## 2022-08-01 RX ADMIN — OXYCODONE HYDROCHLORIDE 10 MG: 10 TABLET ORAL at 12:31

## 2022-08-01 RX ADMIN — DICYCLOMINE HYDROCHLORIDE 10 MG: 10 CAPSULE ORAL at 06:08

## 2022-08-01 RX ADMIN — CARVEDILOL 25 MG: 25 TABLET, FILM COATED ORAL at 17:13

## 2022-08-01 RX ADMIN — SERTRALINE HYDROCHLORIDE 75 MG: 50 TABLET ORAL at 12:26

## 2022-08-01 RX ADMIN — WARFARIN SODIUM 9 MG: 3 TABLET ORAL at 17:16

## 2022-08-01 RX ADMIN — HEPARIN SODIUM 5000 UNITS: 5000 INJECTION INTRAVENOUS; SUBCUTANEOUS at 13:58

## 2022-08-01 NOTE — CONSULTS
Consultation - Infectious Disease   Josef William 56 y o  female MRN: [id number]  Unit/Bed#: [facility] 814-01 Encounter: [id number]      IMPRESSION & RECOMMENDATIONS:   Impression/Recommendations:  1  Possible L4-5 discitis/OM  In setting of #2  MRI with non-specific disc/endplate changes with minimal enhancement, stable over 3 weeks  ESR, CRP bland  Blood cultures negative  Lower suspicion for infection, but cannot exclude  Rec:  · Continue to follow closely off antibiotics  · If there is a concern for infection after review of MRI by Neurosurgery, could attempt percutaneous biopsy for culture    2  Acute on chronic low back pain  Known central stenosis with spondylosis, disc herniation on MRI 2021  Recent worsening in setting of fall early July  Followed by pain management with prior LESIs, most recently in May  Also on pregabalin  Receiving narcotics in-house (60 mg oxycodone, 1 mg dilaudid over 24 hours)  Rec:  · APS consult    3  ESRD on HD  Via AVF  4   Poorly controlled DM with DPN  The above impression and plan was discussed in detail with the patient and RALPH Carrasco with [facility]  Antibiotics:  None    Thank you for this consultation  We will follow along with you  HISTORY OF PRESENT ILLNESS:  Reason for Consult:  Discitis    HPI: Josef William is a 56 y o  female with multiple medical problems including DM, ESRD on HD via AVF  She relays to me that ever since her right foot osteomyelitis requiring TMA in December she has had back pain  (She did not have bacteremia at the time of her foot infection)  She has been seen by pain management in the past and recently underwent a LESI in mid May  Seen in ED early June for ongoing pain, noted to be taking pregabalin incorrectly  she was recently admitted in early July after a noted fall at HD and underwent MRI which suggested possible diskitis/osteomyelitis    ESR, CRP were normal   She was evaluated by Neurosurgery who felt there was a low likelihood of infection  She had no symptoms of sepsis  She was observed off antibiotics with recommendation for repeat imaging  Of note she was also diagnosed with COVID that admission  She was seen by pain management on 7/27 who was considering initiation of Narco   She returned to the emergency department on 07/29 with ongoing pain  CRP was again bland  She underwent CT of the spine which showed vacuum disc phenomenon L4-L5 with complete erosion suggesting 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  She underwent MRI with contrast which showed L4-L5 endplate erosion with edema with minimal enhancement  Images noted to be similar to prior MRI  Given concern for possible infection, we are asked to comment in further evaluation and management  In performing this consult, I have reviewed prior admission and outpatient visit records in detail  REVIEW OF SYSTEMS:  Chills after just returning from HD  Denies fevers, sweats, nausea, vomiting, or diarrhea  A complete system-based review of systems is otherwise negative      PAST MEDICAL HISTORY:  Past Medical History:   Diagnosis Date    Abnormal uterine bleeding (AUB)     Anemia     Anxiety     Arthritis     Chronic kidney disease     Claustrophobia     Diabetes mellitus ([facility]     Disease of thyroid gland     DVT [facility]deep venous thrombosis) [facility])     End stage kidney disease (HCC)     Foot ulcer due to secondary DM ([facility] )     Hemodialysis patient ([facility] )     Tues, Thurs, Sat    Hypertension     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[facility] )     right eye    Morbid obesity ([facility] )     Osteomyelitis of fifth toe of right foot ([facility])     Panic attacks     Pulmonary embolism (HCC)     Reflux esophagitis     Thrombophlebitis of saphenous vein     Warfarin anticoagulation      Past Surgical History:   Procedure Laterality Date    AMPUTATION      r 4th toe    ARTERIOVENOUS GRAFT PLACEMENT      CATARACT EXTRACTION Bilateral     CERVICAL BIOPSY  W/ LOOP ELECTRODE EXCISION       SECTION      DIALYSIS FISTULA CREATION      DILATION AND CURETTAGE OF UTERUS      ENDOMETRIAL ABLATION W/ NOVASURE      EYE SURGERY      HYSTERECTOMY      @ age 57 (complete)    IR AV FISTULAGRAM/GRAFTOGRAM  10/11/2019    IR AV FISTULAGRAM/GRAFTOGRAM  2020    IR AV FISTULAGRAM/GRAFTOGRAM  2020    IR NON-TUNNELED CENTRAL LINE PLACEMENT  2021    IR NON-TUNNELED CENTRAL LINE PLACEMENT  10/4/2021    OOPHORECTOMY Bilateral     @ age 57    [address] [facility] Right 2021    Procedure: AMPUTATION TRANSMETATARSAL (TMA);  Surgeon: Trina Ramirez DPM;  Location: [facility];  Service: Podiatry    AR AMPUTATION TOE,MT-P JT Right 2020    Procedure: AMPUTATION TOE- 5th;  Surgeon: Trina Ramirez DPM;  Location: [facility];  Service: Podiatry    AR COLONOSCOPY FLX DX W/LANDON clara1978 PFRMD N/A 3/13/2019    Procedure: COLONOSCOPY;  Surgeon: Bipin Ocampo MD;  Location: [facility]; Service: Gastroenterology    AR ESOPHAGOGASTRODUODENOSCOPY TRANSORAL DIAGNOSTIC N/A 3/13/2019    Procedure: ESOPHAGOGASTRODUODENOSCOPY (EGD); Surgeon: Bipin Ocampo MD;  Location: [facility];   Service: Gastroenterology    AR LAPAROSCOPY W TOT HYSTERECT UTERUS [address] OR LESS N/A 2016    Procedure: HYSTERECTOMY LAPAROSCOPIC TOTAL [facility]), with bilateral salpingectomy;  Surgeon: Olayinka Salazar DO;  [facility];  Service: Gynecology    THROMBECTOMY / ARTERIOVENOUS GRAFT REVISION      TOE SURGERY Right     removal of the 4th toe    WOUND DEBRIDEMENT Right 10/8/2021    Procedure: R 1st MTPJ washout ;  Surgeon: Liborio Medina DPM;  [facility];  Service: Podiatry       FAMILY HISTORY:  Non-contributory    SOCIAL HISTORY:  Social History     Substance and Sexual Activity   Alcohol Use Never    Alcohol/week: 0 0 standard drinks     Social History     Substance and Sexual Activity   Drug Use No     Social History     Tobacco Use   Smoking Status Never Smoker   Smokeless Tobacco Never Used       ALLERGIES:  Allergies   Allergen Reactions    Iodinated Diagnostic Agents Itching    Keflex [Cephalexin] Hives    Eggs Or Egg-Derived Products - Food Allergy Rash    Wound Dressing Adhesive Rash       MEDICATIONS:  All current active medications have been reviewed  PHYSICAL EXAM:  Vitals:  Temp:  [97 9 °F (36 6 °C)-98 1 °F (36 7 °C)] 98 °F (36 7 °C)  HR:  [] 105  Resp:  [16-22] 20  BP: ()/() 163/79  SpO2:  [94 %-96 %] 95 %  Temp (24hrs), Av °F (36 7 °C), Min:97 9 °F (36 6 °C), Max:98 1 °F (36 7 °C)  Current: Temperature: 98 °F (36 7 °C)     Physical Exam:  General:  Well-nourished, well-developed, in no acute distress  Eyes:  Conjunctive clear with no hemorrhages or effusions  Oropharynx:  No ulcers, no lesions  Neck:  Supple, no lymphadenopathy  Lungs:  Normal respiratory excursion, no accessory muscle use  Cardiac:  Regular rate and rhythm, extremities well perfused  Abdomen:  Soft, non-tender, non-distended  Extremities:  No peripheral cyanosis, clubbing, or edema  Skin:  No rashes, no ulcers  Neurological:  Moves all four extremities spontaneously, sensation grossly intact    LABS, IMAGING, & OTHER STUDIES:  Lab Results:  I have personally reviewed pertinent labs  Results from last 7 days   Lab Units 22  07522  0546 22  0901   POTASSIUM mmol/L 5 3 4 7 5 0   CHLORIDE mmol/L 101 101 106   CO2 mmol/L 31 34* 30   BUN mg/dL 39* 30* 53*   CREATININE mg/dL 7 92* 5 99* 8 21*   EGFR ml/min/1 73sq m 5 7 4   CALCIUM mg/dL 8 5 8 5 8 6     Results from last 7 days   Lab Units 22  0758 22  0546 22  0901   WBC Thousand/uL 4 89 4 57 5 16   HEMOGLOBIN g/dL 8 1* 7 3* 7 4*   PLATELETS Thousands/uL 140* 122* 112*     Results from last 7 days   Lab Units 22  1218   BLOOD CULTURE  No Growth at 48 hrs  No Growth at 48 hrs         Imaging Studies:   I have personally reviewed pertinent imaging study reports and images in PACS  MRI L-spine reviewed personally end plate erosion with edema and enhancement L4-5  EKG, Pathology, and Other Studies:   I have personally reviewed pertinent reports

## 2022-08-01 NOTE — PLAN OF CARE
Problem: Potential for Falls  Goal: Patient will remain free of falls  Description: INTERVENTIONS:  - Educate patient/family on patient safety including physical limitations  - Instruct patient to call for assistance with activity   - Consult OT/PT to assist with strengthening/mobility   - Keep Call bell within reach  - Keep bed low and locked with side rails adjusted as appropriate  - Keep care items and personal belongings within reach  - Initiate and maintain comfort rounds  - Make Fall Risk Sign visible to staff  - Apply yellow socks and bracelet for high fall risk patients  - Consider moving patient to room near nurses station  Outcome: Progressing     Problem: PAIN - ADULT  Goal: Verbalizes/displays adequate comfort level or baseline comfort level  Description: Interventions:  - Encourage patient to monitor pain and request assistance  - Assess pain using appropriate pain scale  - Administer analgesics based on type and severity of pain and evaluate response  - Implement non-pharmacological measures as appropriate and evaluate response  - Consider cultural and social influences on pain and pain management  - Notify physician/advanced practitioner if interventions unsuccessful or patient reports new pain  Outcome: Progressing     Problem: INFECTION - ADULT  Goal: Absence or prevention of progression during hospitalization  Description: INTERVENTIONS:  - Assess and monitor for signs and symptoms of infection  - Monitor lab/diagnostic results  - Monitor all insertion sites, i e  indwelling lines, tubes, and drains  - Monitor endotracheal if appropriate and nasal secretions for changes in amount and color  - Ross appropriate cooling/warming therapies per order  - Administer medications as ordered  - Instruct and encourage patient and family to use good hand hygiene technique  - Identify and instruct in appropriate isolation precautions for identified infection/condition  Outcome: Progressing  Goal: Absence of fever/infection during neutropenic period  Description: INTERVENTIONS:  - Monitor WBC    Outcome: Progressing     Problem: SAFETY ADULT  Goal: Patient will remain free of falls  Description: INTERVENTIONS:  - Educate patient/family on patient safety including physical limitations  - Instruct patient to call for assistance with activity   - Consult OT/PT to assist with strengthening/mobility   - Keep Call bell within reach  - Keep bed low and locked with side rails adjusted as appropriate  - Keep care items and personal belongings within reach  - Initiate and maintain comfort rounds  - Make Fall Risk Sign visible to staff  - Apply yellow socks and bracelet for high fall risk patients  - Consider moving patient to room near nurses station  Outcome: Progressing  Goal: Maintain or return to baseline ADL function  Description: INTERVENTIONS:  -  Assess patient's ability to carry out ADLs; assess patient's baseline for ADL function and identify physical deficits which impact ability to perform ADLs (bathing, care of mouth/teeth, toileting, grooming, dressing, etc )  - Assess/evaluate cause of self-care deficits   - Assess range of motion  - Assess patient's mobility; develop plan if impaired  - Assess patient's need for assistive devices and provide as appropriate  - Encourage maximum independence but intervene and supervise when necessary  - Involve family in performance of ADLs  - Assess for home care needs following discharge   - Consider OT consult to assist with ADL evaluation and planning for discharge  - Provide patient education as appropriate  Outcome: Progressing  Goal: Maintains/Returns to pre admission functional level  Description: INTERVENTIONS:  - Perform BMAT or MOVE assessment daily    - Set and communicate daily mobility goal to care team and patient/family/caregiver     - Collaborate with rehabilitation services on mobility goals if consulted  - Out of bed for toileting  - Record patient progress and toleration of activity level   Outcome: Progressing     Problem: DISCHARGE PLANNING  Goal: Discharge to home or other facility with appropriate resources  Description: INTERVENTIONS:  - Identify barriers to discharge w/patient and caregiver  - Arrange for needed discharge resources and transportation as appropriate  - Identify discharge learning needs (meds, wound care, etc )  - Arrange for interpretive services to assist at discharge as needed  - Refer to Case Management Department for coordinating discharge planning if the patient needs post-hospital services based on physician/advanced practitioner order or complex needs related to functional status, cognitive ability, or social support system  Outcome: Progressing     Problem: Knowledge Deficit  Goal: Patient/family/caregiver demonstrates understanding of disease process, treatment plan, medications, and discharge instructions  Description: Complete learning assessment and assess knowledge base    Interventions:  - Provide teaching at level of understanding  - Provide teaching via preferred learning methods  Outcome: Progressing     Problem: MOBILITY - ADULT  Goal: Maintain or return to baseline ADL function  Description: INTERVENTIONS:  -  Assess patient's ability to carry out ADLs; assess patient's baseline for ADL function and identify physical deficits which impact ability to perform ADLs (bathing, care of mouth/teeth, toileting, grooming, dressing, etc )  - Assess/evaluate cause of self-care deficits   - Assess range of motion  - Assess patient's mobility; develop plan if impaired  - Assess patient's need for assistive devices and provide as appropriate  - Encourage maximum independence but intervene and supervise when necessary  - Involve family in performance of ADLs  - Assess for home care needs following discharge   - Consider OT consult to assist with ADL evaluation and planning for discharge  - Provide patient education as appropriate  Outcome: Progressing  Goal: Maintains/Returns to pre admission functional level  Description: INTERVENTIONS:  - Perform BMAT or MOVE assessment daily    - Set and communicate daily mobility goal to care team and patient/family/caregiver  - Collaborate with rehabilitation services on mobility goals if consulted  - Perform Range of Motion 3 times a day  - Reposition patient every 2 hours  - Dangle patient 3 times a day  - Stand patient 3 times a day  - Ambulate patient 3 times a day  - Out of bed to chair 3 times a day   - Out of bed for meals 3 times a day  - Out of bed for toileting  - Record patient progress and toleration of activity level   Outcome: Progressing     Problem: Nutrition/Hydration-ADULT  Goal: Nutrient/Hydration intake appropriate for improving, restoring or maintaining nutritional needs  Description: Monitor and assess patient's nutrition/hydration status for malnutrition  Collaborate with interdisciplinary team and initiate plan and interventions as ordered  Monitor patient's weight and dietary intake as ordered or per policy  Utilize nutrition screening tool and intervene as necessary  Determine patient's food preferences and provide high-protein, high-caloric foods as appropriate       INTERVENTIONS:  - Monitor oral intake, urinary output, labs, and treatment plans  - Assess nutrition and hydration status and recommend course of action  - Evaluate amount of meals eaten  - Assist patient with eating if necessary   - Allow adequate time for meals  - Recommend/ encourage appropriate diets, oral nutritional supplements, and vitamin/mineral supplements  - Order, calculate, and assess calorie counts as needed  - Recommend, monitor, and adjust tube feedings and TPN/PPN based on assessed needs  - Assess need for intravenous fluids  - Provide specific nutrition/hydration education as appropriate  - Include patient/family/caregiver in decisions related to nutrition  Outcome: Progressing     Problem: METABOLIC, FLUID AND ELECTROLYTES - ADULT  Goal: Electrolytes maintained within normal limits  Description: INTERVENTIONS:  - Monitor labs and assess patient for signs and symptoms of electrolyte imbalances  - Administer electrolyte replacement as ordered  - Monitor response to electrolyte replacements, including repeat lab results as appropriate  - Instruct patient on fluid and nutrition as appropriate  Outcome: Progressing  Goal: Fluid balance maintained  Description: INTERVENTIONS:  - Monitor labs   - Monitor I/O and WT  - Instruct patient on fluid and nutrition as appropriate  - Assess for signs & symptoms of volume excess or deficit  Outcome: Progressing     Problem: Prexisting or High Potential for Compromised Skin Integrity  Goal: Skin integrity is maintained or improved  Description: INTERVENTIONS:  - Identify patients at risk for skin breakdown  - Assess and monitor skin integrity  - Assess and monitor nutrition and hydration status  - Monitor labs   - Assess for incontinence   - Turn and reposition patient  - Assist with mobility/ambulation  - Relieve pressure over bony prominences  - Avoid friction and shearing  - Provide appropriate hygiene as needed including keeping skin clean and dry  - Evaluate need for skin moisturizer/barrier cream  - Collaborate with interdisciplinary team   - Patient/family teaching  - Consider wound care consult   Outcome: Progressing

## 2022-08-01 NOTE — ASSESSMENT & PLAN NOTE
Patient complaining of severe right-sided low back pain with bilateral lower extremity radiculopathy right more than left x several months, resulting in multiple ED visits  · Prior MRI demonstrates moderately advanced degenerative lumbar spine disease at multi levels  Also L4-5 disc herniation with moderate central canal stenosis  Questionable L4-5 osteomyelitis/discitis on previous MRI  · Also history of right foot diabetic ulcer, osteomyelitis/MRSA infection with prior treatment with IV antibiotics December 2021, s/p right foot amputation  · Patient states back pain may have started around that time but recently has become significantly worse  · Patient underwent bilateral L3, L4 medial branch and L5 dorsal ramus block (for L4-L5, L5-S1 facet joints) at [facility] in May 2022 with no improvement in symptoms  Has not done PT  Has exhausted medication management  Imaging:  · CT lumbar spine w/o, 7/29/22: Vacuum disc phenomenon at L4-L5 with endplate erosive change at this level as well as involving the facet joints at L4-L5 and L5-S1  Findings are stable dating back to prior examinations since 6/28/2022 with overall mild interval progression since 11/10/2021  · MRI lumbar spine w/wo, 7/31/22: MR findings most suggestive for subacute L4-5 osteomyelitis with lesser degree discitis  Noncontrast images appear grossly similar to recent MRI  No paravertebral or epidural abscess  Mild to moderate degenerative canal stenosis at L4-5  Moderate right and mild left foraminal stenosis at this level  Stable central disc extrusion is again noted at L4-L5 demonstrating cranial migration and results in overall mild to moderate canal stenosis  Plan:  · Imaging reviewed with attending and discussed with the patient and her SO   There does appear to have been osteomyelitis at L4-5 but this does not appear active on contrasted images  · Will ordered lumbar flexion/extension XR to look for instability with movement  · Medical management and pain control per primary team  · CRP 20 8 down from 23 9  · ESR 24 down from 26  · BCs x2 from 7/29 pending, NGTD  · Recommend ID consult, appreciate recommendations  · APS consulted for assistance with pain management  · PT/OT evaluation, mobilize as tolerated  · LSO brace PRN comfort  · Routine neuro checks  · DVT PPX: SCDs and heparin  · Patient is on coumadin 9mg daily, INR 2 44 on 8/1  If surgery is considered this admission, INR will need to be less than 1 4    Neurosurgery will continue to follow  Pending results of XR and recommendations from ID, options include surgery this admission (likely L4-5 fusion at minimum) vs outpatient physical therapy with close neurosurgical follow up  The patient currently is not ready to make a decision; we will follow up after imaging is completed to discuss further  In the meantime, recommend continued PT/OT and maximizing pain control  If surgery is considered, she will need extensive clearances by medicine, nephrology, and cardiology  Please call with questions or concerns

## 2022-08-01 NOTE — PHYSICAL THERAPY NOTE
Physical Therapy Cancellation Note    Patient's Name: Kat Shepherd       08/01/22 1238   Note Type   Note type Cancelled Session; Evaluation   Cancel Reasons Patient off floor/test   Additional Comments Pt off floor for HD  Will follow for PT evaluation         Alina Tirado, PT, DPT, G[facility]

## 2022-08-01 NOTE — ASSESSMENT & PLAN NOTE
Lab Results   Component Value Date    EGFR 5 08/01/2022    EGFR 7 07/31/2022    EGFR 4 07/30/2022    CREATININE 7 92 (H) 08/01/2022    CREATININE 5 99 (H) 07/31/2022    CREATININE 8 21 (H) 07/30/2022     Nephrology following closely  Scheduled for dialysis T/T/Sa outpatient

## 2022-08-01 NOTE — ASSESSMENT & PLAN NOTE
Lab Results   Component Value Date    HGBA1C 9 4 (H) 07/09/2022       Recent Labs     07/31/22  1631 07/31/22  2101 08/01/22  0724 08/01/22  1058   POCGLU 195* 110 84 111       Blood Sugar Average: Last 72 hrs:  (P) 137   · Continue Lantus 12 units  · Continue SSI and glucose checks TID AC and QHS  · Hypoglycemia protocol

## 2022-08-01 NOTE — PROGRESS NOTES
Pradeep 52 PROGRESS NOTE   Kat Shepherd 56 y o  female MRN: [id number]  Unit/Bed#: [facility] 311-42 Encounter: [id number]  Reason for Consult: ESRD on HD     ASSESSMENT and PLAN:  61-year-old female with history of end-stage renal disease on HD on TTS schedule currently admitted for worsening of lower back pain  We are consulted for evaluation management of ESRD  1  ESRD on HD  - Schedule: T/T/S at [address]  - Last HD: 07/30/2022  - Extra session today for clearance of Gadolinium contrast given with the MRI of spine  - She will receive dialysis tomorrow for more clearance of Gadolinium and to go back on her usual schedule   - Access: Left forearm AV fistula    HEMODIALYSIS PROCEDURE NOTE  The patient was seen and examined on hemodialysis  Time: 4 hours  Sodium: 138 Blood flow: 400   Dialyzer: F180 Potassium: 2 Dialysate flow: 600   Access: L forearm AVF Bicarbonate: 35 Ultrafiltration goal: 2 5 L   Medications on HD: None      2  HTN/Volume   - Medications: Carvedilol 25 mg b i d , Nifedipine 30 mg daily, Torsemide 100 mg daily  - UF: Estimated dry weight 124 kg, her predialysis weight today was 128 4 kg  - We were aiming for 3 5 L her blood pressure dropped during dialysis so we will decrease UF goal to 2 5 L today  - We will use cool dialysate (35 C) to prevent intradialytic hypotension  - Stop nifedipine to allow ultrafiltration on dialysis    3  Anemia   - Goal Hb 10-11, currently below goal  - Outpatient medications: We will get records from outpatient dialysis unit  Lab Results   Component Value Date    HGB 8 1 (L) 08/01/2022     4  Electrolytes   - We are using 2 mEq potassium and 35 mEq bicarbonate bath on dialysis today  Lab Results   Component Value Date     10/11/2015    K 5 3 08/01/2022     08/01/2022    CO2 31 08/01/2022     5   Bone Mineral Disease   - Medications:  Sensipar 30 mg daily, Renvela 2 tablets t i d  with meals  - Goal serum phosphate <5 5   - Check serum phosphorus with a m  labs tomorrow  Lab Results   Component Value Date    CALCIUM 8 5 08/01/2022    PHOS 7 0 (H) 07/13/2022      6  Acute on chronic back pain  - Patient was recently evaluated by Infectious Disease and Neurosurgery during previous admission But there was a low suspicion for discitis/osteomyelitis at bedtime  - MRI 08/01/2022: Findings most suggestive for subacute L4-5 osteomyelitis with lesser degree discitis  - Blood cultures negative to date  - Follow recommendations from neurosurgery and infectious disease     DISPOSITION:  - HD today and then back to schedule tomorrow  - Follow recommendations from neurosurgery and infectious disease regarding subacute L4-5 osteomyelitis    SUBJECTIVE / 24H INTERVAL HISTORY:  Patient seen and examined on hemodialysis this morning  She denies chest pain or dyspnea  She continues to have lower back pain which has slightly improved from before      OBJECTIVE:  Current Weight: Weight - Scale: 130 kg (286 lb 9 6 oz)  Vitals:    08/01/22 0830 08/01/22 0900 08/01/22 0915 08/01/22 0930   BP: (!) 146/102 118/62 142/66 124/79   BP Location: Right arm   Right arm   Pulse: 73 80 72 87   Resp: 20 20  20   Temp:       TempSrc:       SpO2:       Weight:       Height:           Intake/Output Summary (Last 24 hours) at 8/1/2022 0946  Last data filed at 8/1/2022 0755  Gross per 24 hour   Intake 920 ml   Output --   Net 920 ml     General: Awake and alert   Eyes: Conjunctivae pink, sclera anicteric  ENT: Lips and mucous membranes moist  Neck: Supple   Chest: Clear to auscultation bilaterally   CVS: S1 & S2 present, normal rate, regular rhythm, no murmur  Abdomen: Soft, non-tender, non-distended, Bowel sounds normoactive  Extremities: Left transmetatarsal amputation, trace edema bilaterally   Skin: No rash  Neuro: Awake, alert and oriented  Psych: Mood and affect appropriate   Access: Left forearm AV fistula currently in use    Medications:    Current Facility-Administered Medications:     acetaminophen (TYLENOL) tablet 975 mg, 975 mg, Oral, Q8H [address], Jennifer Diaz PA-C, 975 mg at 08/01/22 0608    albuterol (PROVENTIL HFA,VENTOLIN HFA) inhaler 2 puff, 2 puff, Inhalation, Q6H PRN, Chelita Abdullahi MD    ALPRAZolam Kiera Laser) tablet 0 25 mg, 0 25 mg, Oral, BID PRN, Chelita Abdullahi MD, 0 25 mg at 08/01/22 0808    atorvastatin (LIPITOR) tablet 20 mg, 20 mg, Oral, QPM, Chelita Abdullahi MD, 20 mg at 07/31/22 1655    b complex-vitamin C-folic acid (NEPHROCAPS) capsule 1 capsule, 1 capsule, Oral, Daily With Shahbaz Florence MD, 1 capsule at 07/31/22 1654    carvedilol (COREG) tablet 25 mg, 25 mg, Oral, BID, Chelita Abdullahi MD, 25 mg at 07/31/22 1655    cinacalcet (SENSIPAR) tablet 30 mg, 30 mg, Oral, Daily, Chelita Abdullahi MD, 30 mg at 07/31/22 0851    Diclofenac Sodium (VOLTAREN) 1 % topical gel 2 g, 2 g, Topical, 4x Daily, LOIS Mills, 2 g at 07/30/22 1716    dicyclomine (BENTYL) capsule 10 mg, 10 mg, Oral, 4x Daily (AC & HS), Chelita Abdullahi MD, 10 mg at 08/01/22 0608    gabapentin (NEURONTIN) capsule 100 mg, 100 mg, Oral, Daily, Chelita Abdullahi MD, 100 mg at 07/31/22 0850    heparin (porcine) subcutaneous injection 5,000 Units, 5,000 Units, Subcutaneous, Q8H [address], 5,000 Units at 08/01/22 0608 **AND** Platelet count, , , Once, Triage Protocol Emergency, MD    HYDROmorphone HCl (DILAUDID) injection 0 2 mg, 0 2 mg, Intravenous, Q4H PRN, Jennifer Diaz PA-C, 0 2 mg at 07/31/22 2210    insulin glargine (LANTUS) subcutaneous injection 12 Units 0 12 mL, 12 Units, Subcutaneous, HS, Jennifer Diaz PA-C, 12 Units at 07/31/22 2122    insulin lispro (HumaLOG) 100 units/mL subcutaneous injection 1-5 Units, 1-5 Units, Subcutaneous, TID AC, 1 Units at 07/31/22 1657 **AND** Fingerstick Glucose (POCT), , , TID AC, Santo Ramirez MD    insulin lispro (HumaLOG) 100 units/mL subcutaneous injection 1-5 Units, 1-5 Units, Subcutaneous, HS, Chelita Abdullahi MD, 2 Units at 07/30/22 2100    levothyroxine tablet 50 mcg, 50 mcg, Oral, Daily, Bashir Bajwa MD, 50 mcg at 08/01/22 0608    lidocaine (LIDODERM) 5 % patch 1 patch, 1 patch, Topical, Daily, Bashir Bajwa MD, 1 patch at 07/31/22 0851    loratadine (CLARITIN) tablet 10 mg, 10 mg, Oral, Daily, Bashir Bajwa MD, 10 mg at 07/31/22 0850    LORazepam (ATIVAN) injection 1 mg, 1 mg, Intravenous, Once, [employer], CRNP    melatonin tablet 3 mg, 3 mg, Oral, HS, Bashir Bajwa MD, 3 mg at 07/31/22 2121    menthol-methyl salicylate (BENGAY) 60-78 % cream, , Apply externally, 4x Daily PRN, Fan Horton MD, Given at 07/31/22 2127    NIFEdipine (PROCARDIA XL) 24 hr tablet 30 mg, 30 mg, Oral, Daily, Bashir Bajwa MD, 30 mg at 07/31/22 0851    nystatin (MYCOSTATIN) powder, , Topical, BID, Bashir Bajwa MD, Given at 07/30/22 1716    ondansetron (ZOFRAN) injection 4 mg, 4 mg, Intravenous, Q4H PRN, Bashir Bajwa MD    oxyCODONE (ROXICODONE) immediate release tablet 10 mg, 10 mg, Oral, Q4H PRN, Bashir Bajwa MD, 10 mg at 08/01/22 0419    oxyCODONE (ROXICODONE) IR tablet 5 mg, 5 mg, Oral, Q4H PRN, Bashir Bajwa MD    pantoprazole (PROTONIX) EC tablet 40 mg, 40 mg, Oral, Early Morning, Bashir Bajwa MD, 40 mg at 08/01/22 0608    polyethylene glycol (MIRALAX) packet 17 g, 17 g, Oral, Daily, Bashir Bajwa MD    pregabalin (LYRICA) capsule 50 mg, 50 mg, Oral, Daily, Bashir Bajwa MD, 50 mg at 07/31/22 0850    saliva substitute (MOUTH KOTE) mucosal solution 5 spray, 5 spray, Mouth/Throat, 4x Daily PRN, Kiersten S Rayna, CRNP    senna (SENOKOT) tablet 17 2 mg, 2 tablet, Oral, HS PRN, Bashir Bajwa MD    sertraline (ZOLOFT) tablet 75 mg, 75 mg, Oral, Daily, Bashir Bajwa MD, 75 mg at 07/31/22 0851    sevelamer (RENAGEL) tablet 1,600 mg, 1,600 mg, Oral, TID With Meals, Bashir Bajwa MD, 1,600 mg at 07/31/22 1654    torsemide (DEMADEX) tablet 100 mg, 100 mg, Oral, Daily, Bashir Bajwa MD, 100 mg at 07/31/22 0850    warfarin (COUMADIN) tablet 9 mg, 9 mg, Oral, Daily (warfarin), Prem Chavez MD, 9 mg at 07/31/22 1842    Laboratory Results:  Results from last 7 days   Lab Units 08/01/22  0758 07/31/22  0546 07/30/22  0901 07/29/22  1218   WBC Thousand/uL 4 89 4 57 5 16 4 67   HEMOGLOBIN g/dL 8 1* 7 3* 7 4* 7 5*   HEMATOCRIT % 25 4* 23 5* 24 2* 23 8*   PLATELETS Thousands/uL 140* 122* 112* 114*   POTASSIUM mmol/L 5 3 4 7 5 0 5 3   CHLORIDE mmol/L 101 101 106 105   CO2 mmol/L 31 34* 30 30   BUN mg/dL 39* 30* 53* 47*   CREATININE mg/dL 7 92* 5 99* 8 21* 6 83*   CALCIUM mg/dL 8 5 8 5 8 6 8 6

## 2022-08-01 NOTE — QUICK NOTE
Received Epic Alerts U message regarding HR 93, resp 22, BP 83 and creatinine 7 92  Patient at dialysis  Per vitals review, documented resp rate was only 20  Repeat vitals 1 minute later of temp 98, HR 88, resp 20, /59  Patient is ESRD on HD  MRI L spine with findings most suggestive for subacute L4-5 PM/discitis per the results report  Neurosurgery was already made aware and ID consulted

## 2022-08-01 NOTE — PROGRESS NOTES
[address] [facility]  Progress Note Florin Room 1965, 56 y o  female MRN: [id number]  Unit/Bed#: [facility] 814-01 Encounter: [id number]  Primary Care Provider: Blu Smart MD   Date and time admitted to hospital: 7/29/2022 11:10 AM    * Intractable back pain  Assessment & Plan  · Patient complains of low back pain with bilateral lower extremity radiculopathy, right greater than left  · Patient was seen by ID and Neurosurgery during recent admission for abnormal MRI of the lumbar spine and concern for possible discitis/OM but was felt that this was unlikely  · Neurosurgery following this admission, appreciate their ongoing recommendations  · Lumbar flexion/extension XR orderfed  · Plan for surgery vs outpatient PT with close follow up per Neurosurgery recs  · Repeat MRI L-spine this admission revealed findings suggestive of subacute L4-5 OM with lesser degree discitis, mild to moderate degenerative canal stenosis at L4-5, moderate right and mild left foraminal stenosis at this level  · ID consulted - being monitored off abx  Defer ?percutaneous biopsy for culture to Neurosurgery  · CRP elevated but lower than last admission  · Patient on multimodal pain regimen   APS consulted, pain control per their recs  · Follows with Pain Management as outpatient     ESRD on hemodialysis  Assessment & Plan  Lab Results   Component Value Date    EGFR 5 08/01/2022    EGFR 7 07/31/2022    EGFR 4 07/30/2022    CREATININE 7 92 (H) 08/01/2022    CREATININE 5 99 (H) 07/31/2022    CREATININE 8 21 (H) 07/30/2022   · Nephrology following for management of dialysis    History of venous thromboembolism  Assessment & Plan  · Continue Coumadin  · Monitor INR    Type 2 diabetes mellitus [facility])  Assessment & Plan  Lab Results   Component Value Date    HGBA1C 9 4 (H) 07/09/2022       Recent Labs     07/31/22  1631 07/31/22  2101 08/01/22  0724 08/01/22  1058   POCGLU 195* 110 84 111       Blood Sugar Average: Last 72 hrs:  (P) 137   · Continue Lantus 12 units  · Continue SSI and glucose checks TID AC and QHS  · Hypoglycemia protocol     Essential hypertension  Assessment & Plan  · Continue Coreg    Morbid obesity  Assessment & Plan  Body mass index is 46 26 kg/m²  · Encourage dietary and lifestyle modifications         VTE Pharmacologic Prophylaxis:   coumadin    Patient Centered Rounds: I performed bedside rounds with nursing staff today  Discussions with Specialists or Other Care Team Provider: APS    Education and Discussions with Family / Patient: Patient declined call to   Time Spent for Care: 30 minutes  More than 50% of total time spent on counseling and coordination of care as described above  Current Length of Stay: 2 day(s)  Current Patient Status: Inpatient   Certification Statement: The patient will continue to require additional inpatient hospital stay due to pending further neurosurgery work up and recs, pain control  Discharge Plan: pending further neurosurgery work up and recs and pain control     Code Status: Level 1 - Full Code    Subjective:   Ms Zoran Arias reports not feeling well for dialysis today  She reports back pain and nausea     Objective:     Vitals:   Temp (24hrs), Av °F (36 7 °C), Min:97 9 °F (36 6 °C), Max:98 1 °F (36 7 °C)    Temp:  [97 9 °F (36 6 °C)-98 1 °F (36 7 °C)] 98 1 °F (36 7 °C)  HR:  [] 105  Resp:  [16-22] 17  BP: ()/() 156/60  SpO2:  [94 %-96 %] 96 %  Body mass index is 46 26 kg/m²  Input and Output Summary (last 24 hours): Intake/Output Summary (Last 24 hours) at 2022 1609  Last data filed at 2022 1130  Gross per 24 hour   Intake 600 ml   Output 2450 ml   Net -1850 ml       Physical Exam:   Physical Exam  Vitals and nursing note reviewed  Constitutional:       Appearance: She is obese  Comments: Patient seen sitting in bed, NAD   Cardiovascular:      Rate and Rhythm: Normal rate and regular rhythm     Pulmonary: Effort: Pulmonary effort is normal  No respiratory distress  Breath sounds: Normal breath sounds  Abdominal:      General: Bowel sounds are normal       Palpations: Abdomen is soft  Tenderness: There is no abdominal tenderness  Musculoskeletal:      Right lower leg: No edema  Left lower leg: No edema  Skin:     General: Skin is warm  Neurological:      Mental Status: She is alert and oriented to person, place, and time  Psychiatric:         Mood and Affect: Mood normal           Additional Data:     Labs:  Results from last 7 days   Lab Units 08/01/22  0758 07/30/22  0901 07/29/22  1218   WBC Thousand/uL 4 89   < > 4 67   HEMOGLOBIN g/dL 8 1*   < > 7 5*   HEMATOCRIT % 25 4*   < > 23 8*   PLATELETS Thousands/uL 140*   < > 114*   NEUTROS PCT %  --   --  68   LYMPHS PCT %  --   --  17   MONOS PCT %  --   --  12   EOS PCT %  --   --  2    < > = values in this interval not displayed       Results from last 7 days   Lab Units 08/01/22  0758   SODIUM mmol/L 138   POTASSIUM mmol/L 5 3   CHLORIDE mmol/L 101   CO2 mmol/L 31   BUN mg/dL 39*   CREATININE mg/dL 7 92*   ANION GAP mmol/L 6   CALCIUM mg/dL 8 5   GLUCOSE RANDOM mg/dL 87     Results from last 7 days   Lab Units 08/01/22  0758   INR  2 44*     Results from last 7 days   Lab Units 08/01/22  1058 08/01/22  0724 07/31/22  2101 07/31/22  1631 07/31/22  1103 07/31/22  0754 07/30/22  2055 07/30/22  1601 07/30/22  1300 07/30/22  0726 07/29/22  2213 07/29/22  2150   POC GLUCOSE mg/dl 111 84 110 195* 106 141* 237* 145* 108 224* 72 43*               Lines/Drains:  Invasive Devices  Report    Peripheral Intravenous Line  Duration           Peripheral IV 07/31/22 Right Forearm 1 day          Line  Duration           Hemodialysis AV Fistula 02/20/18 Left Forearm 1622 days                      Imaging: Reviewed radiology reports from this admission including: MRI spine    Recent Cultures (last 7 days):   Results from last 7 days   Lab Units 07/29/22  1218 BLOOD CULTURE  No Growth at 48 hrs  No Growth at 48 hrs         Last 24 Hours Medication List:   Current Facility-Administered Medications   Medication Dose Route Frequency Provider Last Rate    albuterol  2 puff Inhalation Q6H PRN Bashir Bajwa MD      ALPRAZolam  0 25 mg Oral BID PRN Bashir Bajwa MD      atorvastatin  20 mg Oral QPM Bashir Bajwa MD      b complex-vitamin C-folic acid  1 capsule Oral Daily With Humphrey Pak MD      carvedilol  25 mg Oral BID Bashir Bajwa MD      cinacalcet  30 mg Oral Daily Bashir Bajwa MD      Diclofenac Sodium  2 g Topical 4x Daily LOIS Mills      dicyclomine  10 mg Oral 4x Daily (AC & HS) Bashir Bajwa MD      heparin (porcine)  5,000 Units Subcutaneous Q8H [address] Triage Protocol Emergency, MD      HYDROmorphone  0 2 mg Intravenous Q4H PRN Caren Chong PA-C      insulin glargine  12 Units Subcutaneous HS Caren Chong PA-C      insulin lispro  1-5 Units Subcutaneous TID AC Bashir Bajwa MD      insulin lispro  1-5 Units Subcutaneous HS Bashir Bajwa MD      levothyroxine  50 mcg Oral Daily Bashir Bajwa MD      lidocaine  1 patch Topical Daily Bashir Bajwa MD      loratadine  10 mg Oral Daily Bashir Bajwa MD      LORazepam  1 mg Intravenous Once LOIS Mills      melatonin  3 mg Oral HS Bashir Bajwa MD      menthol-methyl salicylate   Apply externally 4x Daily PRN Fan Horton MD      nystatin   Topical BID Bashir Bajwa MD      ondansetron  4 mg Intravenous Q4H PRN Bashir Bajwa MD      oxyCODONE-acetaminophen  1 tablet Oral Q6H PRN LOIS Byers      Or    oxyCODONE-acetaminophen  2 tablet Oral Q6H PRN LOIS Byers      pantoprazole  40 mg Oral Early Morning Bashir Bajwa MD      polyethylene glycol  17 g Oral Daily Bashir Bajwa MD      pregabalin  50 mg Oral Daily Bashir Bajwa MD      saliva substitute  5 spray Mouth/Throat 4x Daily PRN LOIS Stokes      senna  2 tablet Oral HS PRN Abel Henson MD      sertraline  75 mg Oral Daily Abel Henson MD      sevelamer  1,600 mg Oral TID With Meals Abel Henson MD      torsemide  100 mg Oral Daily Abel Henson MD      warfarin  9 mg Oral Daily (warfarin) Abel Henson MD          Today, Patient Was Seen By: German Jessica PA-C    **Please Note: This note may have been constructed using a voice recognition system  **

## 2022-08-01 NOTE — PROGRESS NOTES
[address] [facility]  Progress Note Sandra Foote 1965, 56 y o  female MRN: [id number]  Unit/Bed#: [facility] 814-01 Encounter: [id number]  Primary Care Provider: Erin Tristan MD   Date and time admitted to hospital: 7/29/2022 11:10 AM    * Intractable back pain  Assessment & Plan  Patient complaining of severe right-sided low back pain with bilateral lower extremity radiculopathy right more than left x several months, resulting in multiple ED visits  · Prior MRI demonstrates moderately advanced degenerative lumbar spine disease at multi levels  Also L4-5 disc herniation with moderate central canal stenosis  Questionable L4-5 osteomyelitis/discitis on previous MRI  · Also history of right foot diabetic ulcer, osteomyelitis/MRSA infection with prior treatment with IV antibiotics December 2021, s/p right foot amputation  · Patient states back pain may have started around that time but recently has become significantly worse  · Patient underwent bilateral L3, L4 medial branch and L5 dorsal ramus block (for L4-L5, L5-S1 facet joints) at [facility] in May 2022 with no improvement in symptoms  Has not done PT  Has exhausted medication management  Imaging:  · CT lumbar spine w/o, 7/29/22: Vacuum disc phenomenon at L4-L5 with endplate erosive change at this level as well as involving the facet joints at L4-L5 and L5-S1  Findings are stable dating back to prior examinations since 6/28/2022 with overall mild interval progression since 11/10/2021  · MRI lumbar spine w/wo, 7/31/22: MR findings most suggestive for subacute L4-5 osteomyelitis with lesser degree discitis  Noncontrast images appear grossly similar to recent MRI  No paravertebral or epidural abscess  Mild to moderate degenerative canal stenosis at L4-5  Moderate right and mild left foraminal stenosis at this level   Stable central disc extrusion is again noted at L4-L5 demonstrating cranial migration and results in overall mild to moderate canal stenosis  Plan:  · Imaging reviewed with attending and discussed with the patient and her SO  There does appear to have been osteomyelitis at L4-5 but this does not appear active on contrasted images  · Will ordered lumbar flexion/extension XR to look for instability with movement  · Medical management and pain control per primary team  · CRP 20 8 down from 23 9  · ESR 24 down from 26  · BCs x2 from 7/29 pending, NGTD  · Recommend ID consult, appreciate recommendations  · APS consulted for assistance with pain management  · PT/OT evaluation, mobilize as tolerated  · LSO brace PRN comfort  · Routine neuro checks  · DVT PPX: SCDs and heparin  · Patient is on coumadin 9mg daily, INR 2 44 on 8/1  If surgery is considered this admission, INR will need to be less than 1 4    Neurosurgery will continue to follow  Pending results of XR and recommendations from ID, options include surgery this admission (likely L4-5 fusion at minimum) vs outpatient physical therapy with close neurosurgical follow up  The patient currently is not ready to make a decision; we will follow up after imaging is completed to discuss further  In the meantime, recommend continued PT/OT and maximizing pain control  If surgery is considered, she will need extensive clearances by medicine, nephrology, and cardiology  Please call with questions or concerns         Type 2 diabetes mellitus [facility])  Assessment & Plan  Lab Results   Component Value Date    HGBA1C 9 4 (H) 07/09/2022       Recent Labs     07/31/22  1631 07/31/22  2101 08/01/22  0724 08/01/22  1058   POCGLU 195* 110 84 111       Blood Sugar Average: Last 72 hrs:  (P) 137     SSI   Medical management per primary team    ESRD on hemodialysis  Assessment & Plan  Lab Results   Component Value Date    EGFR 5 08/01/2022    EGFR 7 07/31/2022    EGFR 4 07/30/2022    CREATININE 7 92 (H) 08/01/2022    CREATININE 5 99 (H) 07/31/2022    CREATININE 8 21 (H) 07/30/2022     Nephrology following closely  Scheduled for dialysis T/T/Sa outpatient    History of venous thromboembolism  Assessment & Plan  On coumadin 9mg daily  INR 2 44 8/1    Will need INR < 1 4 for surgery if ultimately recommended              Subjective/Objective   Chief Complaint: "My pain is worse today than last week"    Subjective:  Patient seen today just after dialysis  She states that her back pain and right leg pain is excruciating  Is not controlled with her current medication regimen  She is able to ambulate with a walker to and from the bathroom  She states that her pain is largely located in her low back and radiates through her hip and down her anterior thigh  She states that this occasionally does get out of her foot  She has similar pain in her left lower extremity but is much more mild and tolerable  She also has associated numbness and tingling in her right lower extremity  She continues to have left hip flexor weakness, likely secondary to pain  She denies bowel or bladder dysfunction  She denies fever, chills, shortness of breath, abdominal pain, diarrhea, vomiting, nausea  Objective:  Patient is sitting on edge of bed, visibly in pain and tearful  Vital signs stable  Intake/Output                 08/01/22 0701 - 08/02/22 0700     [id number] 7407-4215 Total              Intake    I V   500  -- 500    Other  100  -- 100    Total Intake 600 -- 600       Output    Other  2450  -- 2450    Dialysis Fluid Removed 2450 -- 2450    Total Output 2450 -- 2450       Net I/O     -1850 -- -1850          Invasive Devices  Report    Peripheral Intravenous Line  Duration           Peripheral IV 07/31/22 Right Forearm <1 day          Line  Duration           Hemodialysis AV Fistula 02/20/18 Left Forearm 1622 days                Vitals: Blood pressure 163/79, pulse 105, temperature 98 °F (36 7 °C), temperature source Oral, resp   rate 20, height 5' 6" (1 676 m), weight 130 kg (286 lb 9 6 oz), last menstrual period 02/12/2016, SpO2 95 %, not currently breastfeeding  ,Body mass index is 46 26 kg/m²  General appearance: alert, appears stated age, cooperative and no distress  Obese  Head: Normocephalic, without obvious abnormality, atraumatic  Eyes: EOMI, PERRL, conjugate gaze  Back: lumbar midline tenderness  Lungs: non labored breathing  Heart: regular heart rate  Neurologic:   Mental status: Alert, oriented, thought content appropriate, speech clear and fluent  Cranial nerves: grossly intact (Cranial nerves II-XII)  Sensory: decreased sensation right calf (medial and lateral) to foot  Motor: 3/5 right HF weakness  Otherwise, 5/5 strength    Lab Results: I have personally reviewed pertinent results  Results from last 7 days   Lab Units 08/01/22  0758 07/31/22  0546 07/30/22  0901 07/29/22  1218   WBC Thousand/uL 4 89 4 57 5 16 4 67   HEMOGLOBIN g/dL 8 1* 7 3* 7 4* 7 5*   HEMATOCRIT % 25 4* 23 5* 24 2* 23 8*   PLATELETS Thousands/uL 140* 122* 112* 114*   NEUTROS PCT %  --   --   --  68   MONOS PCT %  --   --   --  12     Results from last 7 days   Lab Units 08/01/22  0758 07/31/22  0546 07/30/22  0901   POTASSIUM mmol/L 5 3 4 7 5 0   CHLORIDE mmol/L 101 101 106   CO2 mmol/L 31 34* 30   BUN mg/dL 39* 30* 53*   CREATININE mg/dL 7 92* 5 99* 8 21*   CALCIUM mg/dL 8 5 8 5 8 6             Results from last 7 days   Lab Units 08/01/22  0758 07/31/22  0546 07/30/22  0901   INR  2 44* 2 25* 2 21*     No results found for: TROPONINT  ABG:No results found for: PHART, ZXL2DIQ, PO2ART, GNO0VHZ, W4FABUCW, BEART, SOURCE    Imaging Studies: I have personally reviewed pertinent reports  and I have personally reviewed pertinent films in PACS     CT chest abdomen pelvis wo contrast    Result Date: 7/9/2022  Narrative: CT CHEST, ABDOMEN AND PELVIS WITHOUT IV CONTRAST INDICATION:   trauma   COMPARISON:  CT 6/28/2022 and priors TECHNIQUE: CT examination of the chest, abdomen and pelvis was performed without intravenous contrast  This examination was performed without intravenous contrast in the context of the critical nationwide Omnipaque shortage  Axial, sagittal, and coronal 2D reformatted images were created from the source data and submitted for interpretation  Radiation dose length product (DLP) for this visit:  2760 15 mGy-cm   This examination, like all CT scans performed in the [facility], was performed utilizing techniques to minimize radiation dose exposure, including the use of iterative reconstruction and automated exposure control  Enteric contrast was not administered  FINDINGS: CHEST LUNGS:  Lungs are clear  Left upper granuloma  There is no tracheal or endobronchial lesion  PLEURA:  Unremarkable  HEART/GREAT VESSELS: Coronary atherosclerosis  No thoracic aortic aneurysm  Low-density blood pool suggesting underlying anemia  MEDIASTINUM AND MIKY:  Unremarkable  CHEST WALL AND LOWER NECK:  Unremarkable  ABDOMEN LIVER/BILIARY TREE:  Unremarkable  GALLBLADDER:  No calcified gallstones  No pericholecystic inflammatory change  SPLEEN:  Unremarkable  PANCREAS:  Unremarkable  ADRENAL GLANDS:  Unremarkable  KIDNEYS/URETERS:  Stable small left renal cyst  Bilateral renal atrophy  No hydronephrosis  STOMACH AND BOWEL:  Colon diverticula  APPENDIX:  No findings to suggest appendicitis  ABDOMINOPELVIC CAVITY:  No ascites  No pneumoperitoneum  No lymphadenopathy  VESSELS:  Atherosclerotic aorta and branch vessels  PELVIS REPRODUCTIVE ORGANS:  Unremarkable for patient's age  URINARY BLADDER:  Unremarkable  ABDOMINAL WALL/INGUINAL REGIONS:  Stable fat-containing umbilical hernia  OSSEOUS STRUCTURES:  No acute fracture or destructive osseous lesion  Bones appear somewhat increased in density diffusely, possibly from underlying medical renal osteodystrophy  Impression: 1  No acute traumatic findings  Incidental findings as above    I personally discussed this study with Dr Melani Mcneal on 7/9/2022 at 8:10 PM  Workstation performed: [id number]     XR chest 1 view portable    Result Date: 7/25/2022  Narrative: CHEST INDICATION:   chest pain  COMPARISON:  7/9/2022 EXAM PERFORMED/VIEWS:  XR CHEST PORTABLE FINDINGS: Cardiomediastinal silhouette appears unremarkable  The lungs are clear  Mild elevation right hemidiaphragm  No pneumothorax or pleural effusion  Osseous structures appear within normal limits for patient age  Impression: No acute cardiopulmonary disease  Workstation performed: [id number]     XR spine lumbar 2 or 3 views injury    Result Date: 7/13/2022  Narrative: LUMBAR SPINE INDICATION:   Concern for L4-5 early discitis/Osteomyelitis  COMPARISON:  CT chest abdomen pelvis 07/09/2022  MRI lumbar spine 07/12/2022  VIEWS:  XR SPINE LUMBAR 2 OR 3 VIEWS INJURY FINDINGS: There are 5 non rib bearing lumbar vertebral bodies  Anterolisthesis at L4-L5, this is not seen on prior MRI or CT  There is intervertebral disc height loss at the same level  Mild spinal curvature  Degenerative changes most notably involving the posterior facets  The pedicles appear intact  Vascular calcification  Impression: Anterolisthesis at L4-L5, this is not seen on prior CT or MRI  This may be in part due to patient being imaged in the upright position on this exam  However, given concern for discitis/osteomyelitis further evaluation with MRI of the lumbar spine using intravenous contrast is recommended  I personally discussed this study with Rudi Mills on 7/13/2022 at 2:38 PM  Workstation performed: [id number]     TRAUMA - CT head wo contrast    Result Date: 7/9/2022  Narrative: CT BRAIN - WITHOUT CONTRAST INDICATION:   TRAUMA  COMPARISON:  None  TECHNIQUE:  CT examination of the brain was performed  In addition to axial images, sagittal and coronal 2D reformatted images were created and submitted for interpretation  Radiation dose length product (DLP) for this visit:  940 53 mGy-cm     This examination, like all CT scans performed in the [facility], was performed utilizing techniques to minimize radiation dose exposure, including the use of iterative  reconstruction and automated exposure control  IMAGE QUALITY:  Diagnostic  FINDINGS: PARENCHYMA:  No intracranial mass, mass effect or midline shift  No CT signs of acute infarction  No acute parenchymal hemorrhage  VENTRICLES AND EXTRA-AXIAL SPACES:  Normal for the patient's age  Stable extra-axial calcified meningioma in the left frontal region  This measures approximately 1 2 x 1 1 cm  VISUALIZED ORBITS AND PARANASAL SINUSES:  Unremarkable  CALVARIUM AND EXTRACRANIAL SOFT TISSUES:  Bones appear somewhat increased in density diffusely, possibly from underlying medical renal osteodystrophy  Impression: No acute intracranial abnormality  I personally discussed this study with Dr Randolph Rinne on 7/9/2022 at 8:10 PM  Workstation performed: [id number]     TRAUMA - CT spine cervical wo contrast    Result Date: 7/9/2022  Narrative: CT CERVICAL SPINE - WITHOUT CONTRAST INDICATION:   TRAUMA  COMPARISON:  CT 6/9/2021 TECHNIQUE:  CT examination of the cervical spine was performed without intravenous contrast   Contiguous axial images were obtained  Sagittal and coronal reconstructions were performed  Radiation dose length product (DLP) for this visit:  1052 42 mGy-cm   This examination, like all CT scans performed in the [facility], was performed utilizing techniques to minimize radiation dose exposure, including the use of iterative reconstruction and automated exposure control  IMAGE QUALITY:  Diagnostic  FINDINGS: ALIGNMENT:  Normal alignment of the cervical spine  No subluxation  VERTEBRAL BODIES:  No fracture  Bones appear somewhat increased in density diffusely, possibly from underlying medical renal osteodystrophy  DEGENERATIVE CHANGES:  No significant cervical degenerative changes are noted   PREVERTEBRAL AND PARASPINAL SOFT TISSUES: Unremarkable  THORACIC INLET:  Normal      Impression: No cervical spine fracture or traumatic malalignment  I personally discussed this study with Dr Juvencio Steele on 7/9/2022 at 8:10 PM   Workstation performed: [id number]     CT spine lumbar wo contrast    Result Date: 7/29/2022  Narrative: CT LUMBAR SPINE INDICATION:   Lower back pain with right-sided radiculopathy     End-stage renal disease  COMPARISON: MRI from 7/12/2022, CT from 7/9/2022, 6/28/2022 and 11/10/2021  TECHNIQUE:  Contiguous axial images through the lumbar spine were obtained  Sagittal and coronal reconstructions were performed  Radiation dose length product (DLP) for this visit:  1233 mGy-cm   This examination, like all CT scans performed in the [facility], was performed utilizing techniques to minimize radiation dose exposure, including the use of iterative reconstruction and automated exposure control  IMAGE QUALITY:  Diagnostic  FINDINGS: ALIGNMENT:  There are 5 lumbar type vertebral bodies  There is no significant spondylolisthesis  VERTEBRAL BODIES:  The vertebral body heights are maintained  No acute fractures are identified  There is heterogeneous appearance of the visualized osseous structures consistent with renal osteodystrophy  DEGENERATIVE CHANGES: Lower Thoracic spine:  Normal lower thoracic disc spaces ] L1-2:  Facet arthrosis  No significant canal stenosis or foraminal narrowing  L2-3:  No significant canal stenosis or foraminal narrowing  Facet arthrosis  L3-4:  Minimal bulge  Facet arthrosis  No significant canal stenosis or foraminal narrowing  L4-5:  There are erosive changes noted along the inferior endplate posterior aspect of the vertebral body of L4 and lesser extent the superior endplate and anteriorly at L5  There is vacuum disc phenomenon at this level  There is a central disc extrusion at this level demonstrating cranial migration along the dorsal aspect of the L4 vertebral body  There is mild to moderate canal stenosis, stable  There is mild to moderate bilateral foraminal narrowing  There is paravertebral soft tissue swelling  There is erosive change as well as distention of the facet joints bilaterally at this level  L5-S1:  No significant canal stenosis or foraminal narrowing  Erosive changes of the facet joints bilaterally  PARASPINAL SOFT TISSUES:  Atrophy of the kidneys bilaterally and multiple renal cysts noted consistent with end-stage renal disease  Atherosclerotic changes are noted  Impression: Vacuum disc phenomenon at L4-L5 with endplate erosive change at this level as well as involving the facet joints at L4-L5 and L5-S1  Findings are stable dating back to prior examinations since 6/28/2022 with overall mild interval progression since 11/10/2021  Imaging findings in this patient with end-stage renal disease are 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 with infection to be considered less likely (given the presence of vacuum disc phenomenon and relative stability over  one month)  Interval follow-up with MRI is again recommended to ensure stability as previously suggested  Stable central disc extrusion is again noted at L4-L5 demonstrating cranial migration and results in overall mild to moderate canal stenosis  Heterogeneous appearance of the visualized osseous structures consistent with renal osteodystrophy  Workstation performed: [id number]     MRI lumbar spine wo contrast    Result Date: 7/12/2022  Narrative: MRI LUMBAR SPINE WITHOUT CONTRAST INDICATION: lumbar spine pain worsening  Patient has confirmed COVID-19  COMPARISON:  None  TECHNIQUE:  Sagittal T1, sagittal T2, sagittal inversion recovery, axial T1 and axial T2, coronal T2   IMAGE QUALITY:  Diagnostic FINDINGS: VERTEBRAL BODIES:  There are 5 lumbar type vertebral bodies  Marrow edema about the L4-5 intervertebral disc space noted    There is also high signal in the intervertebral disc space  Type II Modic endplate changes noted at the T11, T12, L1 and L2 vertebrae endplates  There is no compression deformity  SACRUM:  Scattered degenerative endplate changes  No focally suspicious marrow lesions  No bone marrow edema or compression abnormality  DISTAL CORD AND CONUS:  Normal size and signal within the distal cord and conus  The conus medullaris terminates at the T12-L1 level  PARASPINAL SOFT TISSUES:  Bilateral renal atrophy with cystic changes noted suggesting end-stage renal disease  LOWER THORACIC DISC SPACES:  Normal disc height and signal   No disc herniation, canal stenosis or foraminal narrowing  LUMBAR DISC SPACES: L1-L2:  There is no focal disk herniation, central canal or neural foraminal narrowing  Bilateral facet hypertrophy noted  L2-L3:  There is no focal disk herniation, central canal or neural foraminal narrowing  Bilateral facet hypertrophy noted  L3-L4:  There is no focal disk herniation, central canal or neural foraminal narrowing  Bilateral facet hypertrophy noted  L4-L5:  There is high signal in the intervertebral disc space  There is a central/right paracentral disc herniation, protrusion type extending into the right neural foramen  Large bilateral facet effusions are exemplified on series 6, image 19  Moderate to severe narrowing of the right neural foramen  Mild central canal narrowing  Moderate left neural foraminal narrowing  Slight endplate erosion about the L4-5 intervertebral disc space may represent spondylotic changes although early infection/discitis/vasculitis could have a similar appearance  No large epidural or paraspinal collection  An adjacent psoas muscles are free of obvious collection  L5-S1:  There is a diffuse disk bulge  No significant central canal or neural foraminal narrowing  Bilateral facet hypertrophy noted  Impression: 1    Marrow edema about the L4-5 intervertebral disc space with high signal in the intervertebral disc space, associated spondylotic narrowing secondary to disc herniation disc and bilateral facet effusions  Findings may be related to type I Modic endplate changes and spondylosis  However, differential diagnosis includes early discitis/osteomyelitis  Further clinical assessment with laboratory values and follow-up advised  Recommend short-term repeat MRI of the lumbar spine in 6 weeks to assess  for stability  2   Bilateral renal atrophy with numerous small cysts correlating the history of end-stage renal disease  Workstation performed: [id number]     MRI lumbar spine w wo contrast    Result Date: 8/1/2022  Narrative: MRI LUMBAR SPINE WITH AND WITHOUT CONTRAST INDICATION: LBP with right radic  COMPARISON:  MRI lumbar spine 7/12/2022 TECHNIQUE:  Sagittal T1, sagittal T2, sagittal inversion recovery, axial T1 and axial T2, coronal T2  Sagittal and axial T1 postcontrast  IV Contrast:  12 mL of Gadobutrol injection (SINGLE-DOSE) IMAGE QUALITY:  Diagnostic FINDINGS: VERTEBRAL BODIES:  There are 5 lumbar type vertebral bodies  Alignment is unremarkable  There is endplate erosion with edema and enhancement at level L4-5  There is intervertebral disc edema with minimal foci of enhancement  There is lesser degree marrow edema and enhancement involving posterior elements with stable bilateral facet joint effusion  There is minimal prevertebral soft tissue swelling  No paravertebral or epidural abscess  The noncontrast images appear grossly similar to prior MRI  SACRUM:  Normal signal within the sacrum  No evidence of insufficiency or stress fracture  DISTAL CORD AND CONUS:  Normal size and signal within the distal cord and conus  PARASPINAL SOFT TISSUES:  Minimal prevertebral swelling at level L4-5  Atrophic kidneys with cystic changes in keeping with history of end stage renal disease  LOWER THORACIC DISC SPACES:  Normal disc height and signal   No disc herniation, canal stenosis or foraminal narrowing   LUMBAR DISC SPACES: L1-L2:  No significant disc bulge  Moderate facet arthropathy  No canal or foraminal stenosis  L2-L3:  No significant disc bulge  Moderate facet arthropathy with no canal or foraminal stenosis  L3-L4:  No significant disc bulge  Moderate bilateral facet arthropathy  No significant canal or foraminal stenosis  L4-L5:  Disc bulge and superimposed central disc protrusion  Moderate facet arthropathy  There is mild to moderate canal stenosis  Moderate right and mild left foraminal stenosis  L5-S1:  No significant disc bulge  Mild facet arthropathy with no canal or significant foraminal stenosis  Impression: 1  MR findings most suggestive for subacute L4-5 osteomyelitis with lesser degree discitis  Noncontrast images appear grossly similar to recent MRI  No paravertebral or epidural abscess  2   Mild to moderate degenerative canal stenosis at L4-5  Moderate right and mild left foraminal stenosis at this level  Workstation performed: [id number]     XR chest 1 view    Result Date: 7/18/2022  Narrative: TRAUMA SERIES INDICATION:  TRAUMA  COMPARISON:  None VIEWS:  XR TRAUMA MULTIPLE  FINDINGS: CHEST: Supine frontal view of the chest is obtained  Cardiomediastinal silhouette is within normal limits accounting for technique and patient positioning  Relative opacity of the left lung compared to the right  This may reflect positioning  Left lung contusion or pneumonia cannot be excluded  No pneumothorax is seen on this supine film  Upright images are more sensitive to detect anterior pneumothoraces if relevant  No displaced fractures  Impression: Increased opacity of the left lung compared to the right which could represent contusion or pneumonia but may at least in part relate to patient rotation  Patient has CT chest abdomen and pelvis ordered   Workstation performed: [id number]     XR Trauma multiple ([facility] trauma bay ONLY)    Result Date: 7/9/2022  Narrative: TRAUMA SERIES INDICATION: TRAUMA  COMPARISON:  None VIEWS:  XR TRAUMA MULTIPLE  FINDINGS: CHEST: Supine frontal view of the chest is obtained  Cardiomediastinal silhouette is within normal limits accounting for technique and patient positioning  Relative opacity of the left lung compared to the right  This may reflect positioning  Left lung contusion or pneumonia cannot be excluded  No pneumothorax is seen on this supine film  Upright images are more sensitive to detect anterior pneumothoraces if relevant  No displaced fractures  Impression: Increased opacity of the left lung compared to the right which could represent contusion or pneumonia but may at least in part relate to patient rotation  Patient has CT chest abdomen and pelvis ordered  Workstation performed: [id number]     [address] [facility],[address] bedside procedure    Result Date: 7/9/2022  Narrative: 1 2 [id number]  3 [id number]  [phone] [id number]  [phone]    EKG, Pathology, and Other Studies: I have personally reviewed pertinent reports        VTE Pharmacologic Prophylaxis: Heparin    VTE Mechanical Prophylaxis: sequential compression device

## 2022-08-01 NOTE — ASSESSMENT & PLAN NOTE
Lab Results   Component Value Date    HGBA1C 9 4 (H) 07/09/2022       Recent Labs     07/31/22  1631 07/31/22  2101 08/01/22  0724 08/01/22  1058   POCGLU 195* 110 84 111       Blood Sugar Average: Last 72 hrs:  (P) 137     SSI   Medical management per primary team

## 2022-08-01 NOTE — CONSULTS
Consult Note- Acute Pain Service   Diana Rivers 56 y o  female MRN: [id number]  Unit/Bed#: [facility] 814-01 Encounter: [id number]               Assessment/Plan     Assessment:   Patient Active Problem List   Diagnosis    Essential hypertension    History of venous thromboembolism    Abnormal uterine bleeding    Glaucoma    ESRD on hemodialysis    Anxiety disorder    Knee pain    Ambulatory dysfunction    [facility] status [facility]    Primary osteoarthritis of right knee    Esophageal reflux    Colon cancer screening    Gastroesophageal reflux disease    Meningioma ([facility])    Nausea and vomiting    Secondary hyperparathyroidism of renal origin ([facility]    Dyspnea    Hyperkalemia    Anemia of chronic disease    Disruption of internal surgical wound    Hyponatremia    Parenchymal renal hypertension    Candidiasis of breast    Hemodialysis-associated hypotension    Lumbar radiculopathy    Low back pain with sciatica    Flu-like symptoms    Diabetic polyneuropathy associated with type 2 diabetes mellitus ([facility]    Tinea pedis    Encounter for diabetic foot exam ([facility]    Corns and callosities    History of transmetatarsal amputation of right foot ([facility]    Morbid obesity    Hyperlipidemia    History of claustrophobia    Allergic rhinitis    Benign neoplasm of skin    Cardiomyopathy ([facility]    Cervical dysplasia    Chronic endometritis    Complication from renal dialysis device    Concussion without loss of consciousness    Type 2 diabetes mellitus ([facility]    Acquired hypothyroidism    Legal blindness, as defined in [facility]    Limb pain    Mononeuritis of upper limb, unspecified laterality    Phlebitis and thrombophlebitis of superficial vessels of lower extremities    Pulmonary embolus ([facility]    S/P foot surgery, right    Tinea unguium    Bilateral primary osteoarthritis of knee    Bilateral patellofemoral syndrome    Chronic pain syndrome    A-V fistula ([facility])    Right foot pain    Arteriovenous fistula for hemodialysis in place, primary ([facility] )    Healthcare-associated pneumonia    Pneumonia    Chronic anticoagulation    Essential hypertension    Sigmoid diverticulitis    Postop check    Pruritus    Constipation    Right knee pain    Fall    Hypoxia    COVID-19    Problem with vascular access    Concern for Osteomyelitis/Discitis of lumbar region    Intractable back pain      Nikky Mills is a 56 y o  female presented on 7/29 with intractable back pain  Past medical history significant for ESRD on HD, HTN, DMII, and recent Covid infection  She was hospitalized earlier this month after a fall at HD and underwent an MRI which suggested possible diskitis/osteomyelitis  At that time she was evaluated by neurosurgery who felt there was low likelihood of infection  She was recommended for repeat imaging outpatient and was ultimately discharged, before returning on 7/29  Repeat MRI this hospitalization revealed similar findings and she is awaiting formal plan from neurosurgery  ID was consulting and is monitoring patient off of antibiotics  APS was consulted for management of acute/chronic back pain, she currently follows with [facility] spine [facility] pain as an outpatient  Plan:   Discussed with patient at length current medication regimen  Patient feels current regimen of oxycodone and scheduled tylenol has not been controlling her pain, and Percocet is the only medication that has worked for her previously    Educated patient on percocet in comparison to her current regimen, however she prefers percocet, recommendations as follows:    · Discontinue scheduled Tylenol 975 mg every 8 hours  · Discontinue Oxycodone 5 mg every 4 hours PRN  · Discontinue Oxycodone 10 mg every 4 hours PRN  · Discontinue Gabapentin 100 mg Daily, Patient is currently on Lyrica and failed gabapentin therapy outpatient  · Discontinue Voltaren gel, patient currently refusing, do not recommend simultaneous use of topical agents, patient is using lidocaine patches    · Recommend Percocet 1 Tablet every 6 hours PRN, for moderate pain  · Recommend Percocet 2 Tablets every 6 hours PRN, for severe pain  · Continue IV Dilaudid 0 2 mg every 4 hours PRN, for breakthrough pain  · Continue Lyrica 50 mg Daily  Estimated Creatinine Clearance: 11 1 mL/min (A) (by C-G formula based on SCr of 7 92 mg/dL (H))  · Continue Lidocaine Patch 5%, 12 hours on/12 hours off, to lower back  Bowel Regimen:  Miralax Daily  [facility]    Treatment plan was discussed with bedside nursing as well as primary care team     APS will continue to follow  Please contact Acute Pain Service - [facility] via [employer] from [phone] with additional questions or concerns  See Aishwarya or Ricki for additional contacts and after hours information  History of Present Illness    Admit Date:  7/29/2022  Hospital Day:  2 days  Primary Service:  Hospitalist  Attending Provider:  Rakan Chew DO  Reason for Consult / Principal Problem: intractable back pain  HPI: Josef William is a 56 y o  female presented on 7/29 with intractable back pain  Past medical history significant for ESRD on HD, HTN, DMII, and recent Covid infection  She was hospitalized earlier this month after a fall at HD and underwent an MRI which suggested possible diskitis/osteomyelitis  At that time she was evaluated by neurosurgery who felt there was low likelihood of infection  She was recommended for repeat imaging outpatient and was ultimately discharged, before returning on 7/29  Repeat MRI this hospitalization revealed similar findings and she is awaiting formal plan from neurosurgery  ID was consulting and is monitoring patient off of antibiotics  APS was consulted for management of acute/chronic back pain, she currently follows with [facility] as an outpatient       Current pain location(s): Low Back with radiation into R hip  Pain Scale:   8  Quality: aching/sharp      Pain History: Patient has diagnosed history of chronic lower back pain  She currently follows outpatient with [facility]  She has received multiple MEAGAN in the past, most recently in may and reports little to no relief  She failed treatment with gabapentin and currently is prescribed Lyrica 50 mg daily  She explains her back pain has increased since TMA of her left foot in Dec 2021  She has been seen in the emergency dept for low back pain multiple times since June 2022  Pain Management Provider:  Dr Júnior Ramirez ( Spine and Pain)    I have reviewed the patient's controlled substance dispensing history in the Prescription Drug Monitoring Program in compliance with the [facility] regulations before prescribing any controlled substances  Inpatient consult to Acute Pain Service  Consult performed by: LOIS Madsen  Consult ordered by: Estee Hamilton PA-C          Review of Systems   Constitutional: Positive for activity change  Negative for chills, fatigue and fever  Respiratory: Negative for cough and shortness of breath  Cardiovascular: Positive for leg swelling  Negative for chest pain  Gastrointestinal: Negative for abdominal distention, abdominal pain, constipation, diarrhea, nausea and vomiting  Genitourinary: Negative for difficulty urinating  Musculoskeletal: Positive for back pain and gait problem  Negative for neck pain and neck stiffness  Neurological: Negative for dizziness, tremors, weakness, light-headedness, numbness and headaches  All other systems reviewed and are negative        Historical Information   Past Medical History:   Diagnosis Date    Abnormal uterine bleeding (AUB)     Anemia     Anxiety     Arthritis     Chronic kidney disease     Claustrophobia     Diabetes mellitus [facility]     Disease of thyroid gland     DVT (deep venous thrombosis) (HCC)     End stage kidney disease (HCC)     Foot ulcer due to secondary DM ([facility]     Hemodialysis patient ([facility]     Tues, Thurs, Sat    Hypertension     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[facility]     right eye    Morbid obesity ([facility]     Osteomyelitis of fifth toe of right foot ([facility]     Panic attacks     Pulmonary embolism (HCC)     Reflux esophagitis     Thrombophlebitis of saphenous vein     Warfarin anticoagulation      Past Surgical History:   Procedure Laterality Date    AMPUTATION      r 4th toe    ARTERIOVENOUS GRAFT PLACEMENT      CATARACT EXTRACTION Bilateral     CERVICAL BIOPSY  W/ LOOP ELECTRODE EXCISION       SECTION      DIALYSIS FISTULA CREATION      DILATION AND CURETTAGE OF UTERUS      ENDOMETRIAL ABLATION W/ NOVASURE      EYE SURGERY      HYSTERECTOMY      @ age 57 (complete)    IR AV FISTULAGRAM/GRAFTOGRAM  10/11/2019    IR AV FISTULAGRAM/GRAFTOGRAM  2020    IR AV FISTULAGRAM/GRAFTOGRAM  2020    IR NON-TUNNELED CENTRAL LINE PLACEMENT  2021    IR NON-TUNNELED CENTRAL LINE PLACEMENT  10/4/2021    OOPHORECTOMY Bilateral     @ age 57    [address] [facility] Right 2021    Procedure: AMPUTATION TRANSMETATARSAL (TMA);  Surgeon: Curt Goss DPM;  Location: [facility];  Service: Podiatry    OK AMPUTATION TOE,MT-P JT Right 2020    Procedure: AMPUTATION TOE- 5th;  Surgeon: Curt Goss DPM;  Location: [facility];  Service: Podiatry    OK COLONOSCOPY FLX DX W/DAVIDEJ Soema 1978 PFRMD N/A 3/13/2019    Procedure: COLONOSCOPY;  Surgeon: Lisa Bermudez MD;  Location: [facility]; Service: Gastroenterology    OK ESOPHAGOGASTRODUODENOSCOPY TRANSORAL DIAGNOSTIC N/A 3/13/2019    Procedure: ESOPHAGOGASTRODUODENOSCOPY (EGD); Surgeon: Lisa Bermudez MD;  Location: [facility];   Service: Gastroenterology    OK LAPAROSCOPY W TOT HYSTERECT UTERUS 250 GRAM OR LESS N/A 2016    Procedure: HYSTERECTOMY LAPAROSCOPIC TOTAL [facility]), with bilateral salpingectomy;  Surgeon: Chloe Quinonez DO;  [facility];  Service: Gynecology    THROMBECTOMY / ARTERIOVENOUS GRAFT REVISION      TOE SURGERY Right     removal of the 4th toe    WOUND DEBRIDEMENT Right 10/8/2021    Procedure: R 1st MTPJ washout ;  Surgeon: Tony Gamino DPM;  [facility];  Service: Podiatry     Social History   Social History     Substance and Sexual Activity   Alcohol Use Never    Alcohol/week: 0 0 standard drinks     Social History     Substance and Sexual Activity   Drug Use No     Social History     Tobacco Use   Smoking Status Never Smoker   Smokeless Tobacco Never Used     Family History:   Family History   Problem Relation Age of Onset    Heart disease Family     Diabetes Family     Heart disease Mother     Cancer Brother         neck    Throat cancer Brother     Ovarian cancer Maternal Aunt 40       Meds/Allergies   all current active meds have been reviewed, current meds:   Current Facility-Administered Medications   Medication Dose Route Frequency    albuterol (PROVENTIL HFA,VENTOLIN HFA) inhaler 2 puff  2 puff Inhalation Q6H PRN    ALPRAZolam (XANAX) tablet 0 25 mg  0 25 mg Oral BID PRN    atorvastatin (LIPITOR) tablet 20 mg  20 mg Oral QPM    b complex-vitamin C-folic acid (NEPHROCAPS) capsule 1 capsule  1 capsule Oral Daily With Dinner    carvedilol (COREG) tablet 25 mg  25 mg Oral BID    cinacalcet (SENSIPAR) tablet 30 mg  30 mg Oral Daily    Diclofenac Sodium (VOLTAREN) 1 % topical gel 2 g  2 g Topical 4x Daily    dicyclomine (BENTYL) capsule 10 mg  10 mg Oral 4x Daily (AC & HS)    HYDROmorphone HCl (DILAUDID) injection 0 2 mg  0 2 mg Intravenous Q4H PRN    insulin glargine (LANTUS) subcutaneous injection 12 Units 0 12 mL  12 Units Subcutaneous HS    insulin lispro (HumaLOG) 100 units/mL subcutaneous injection 1-5 Units  1-5 Units Subcutaneous TID AC    insulin lispro (HumaLOG) 100 units/mL subcutaneous injection 1-5 Units  1-5 Units Subcutaneous HS    levothyroxine tablet 50 mcg  50 mcg Oral Daily  lidocaine (LIDODERM) 5 % patch 1 patch  1 patch Topical Daily    loratadine (CLARITIN) tablet 10 mg  10 mg Oral Daily    LORazepam (ATIVAN) injection 1 mg  1 mg Intravenous Once    melatonin tablet 3 mg  3 mg Oral HS    menthol-methyl salicylate (BENGAY) 89-98 % cream   Apply externally 4x Daily PRN    nystatin (MYCOSTATIN) powder   Topical BID    ondansetron (ZOFRAN) injection 4 mg  4 mg Intravenous Q4H PRN    oxyCODONE-acetaminophen (PERCOCET) 5-325 mg per tablet 1 tablet  1 tablet Oral Q6H PRN    Or    oxyCODONE-acetaminophen (PERCOCET) 5-325 mg per tablet 2 tablet  2 tablet Oral Q6H PRN    pantoprazole (PROTONIX) EC tablet 40 mg  40 mg Oral Early Morning    polyethylene glycol (MIRALAX) packet 17 g  17 g Oral Daily    pregabalin (LYRICA) capsule 50 mg  50 mg Oral Daily    saliva substitute (MOUTH KOTE) mucosal solution 5 spray  5 spray Mouth/Throat 4x Daily PRN    senna (SENOKOT) tablet 17 2 mg  2 tablet Oral HS PRN    sertraline (ZOLOFT) tablet 75 mg  75 mg Oral Daily    sevelamer (RENAGEL) tablet 1,600 mg  1,600 mg Oral TID With Meals    torsemide (DEMADEX) tablet 100 mg  100 mg Oral Daily    warfarin (COUMADIN) tablet 9 mg  9 mg Oral Daily (warfarin)    and PTA meds:   Prior to Admission Medications   Prescriptions Last Dose Informant Patient Reported? Taking? ALPRAZolam (XANAX) 0 25 mg tablet  Self Yes No   Sig: Take 0 25 mg by mouth 2 (two) times a day as needed for anxiety   ALPRAZolam (XANAX) 0 5 mg tablet   Yes No   Sig: TAKE 2 AND 1/2 TABLETS DAILY IN DIVIDED DOSES AS DIRECTED     Accu-Chek Guide test strip   Yes No   Sig: use to TEST BLOOD SUGAR two to three times a day   Accu-Chek Softclix Lancets lancets  Self Yes No   Sig: 3 (three) times a day Use to test blood sugar   B Complex-C-Folic Acid (Natalia-Denys) TABS   No No   Sig: TAKE ONE TABLET BY MOUTH DAILY   KIONEX 15 GM/60ML suspension  Self Yes No   Sig: Take by mouth Takes as a shake on dialysis days Tues-Thurs_Sat NIFEdipine (ADALAT CC) 30 MG 24 hr tablet   No No   Sig: Take 1 tablet (30 mg total) by mouth daily   NOVOLOG FLEXPEN 100 units/mL SOPN  Self Yes No   Sig: INJECT 1 TO 5 UNITS SUBCUTANEOUSLY THREE TIMES A DAY BEFORE MELAS AS PER SLIDING SCALE   Sevelamer Carbonate 2 4 g PACK   Yes No   Sig: STIR 1 PACKET INTO FOOD 3 TIMES A DAY WITH MEALS   albuterol (ProAir HFA) 90 mcg/act inhaler   No No   Sig: Inhale 2 puffs every 6 (six) hours as needed for wheezing or shortness of breath   ammonium lactate (LAC-HYDRIN) 12 % cream   No No   Sig: Apply topically 2 (two) times a day   atorvastatin (LIPITOR) 20 mg tablet  Self Yes No   Sig: Take 20 mg by mouth every evening    carvedilol (COREG) 25 mg tablet   No No   Sig: TAKE ONE TABLET BY MOUTH TWICE A DAY   cinacalcet (SENSIPAR) 30 mg tablet  Self Yes No   Sig: Take 30 mg by mouth daily   dicyclomine (BENTYL) 10 mg capsule   Yes No   Sig: TAKE 1 TABLET BY MOUTH EVERY 6 HOURS OR AS NEEDED FOR PAIN   gabapentin (NEURONTIN) 100 mg capsule   Yes No   insulin glargine (LANTUS) 100 units/mL subcutaneous injection   No No   Sig: Inject 12 Units under the skin daily at bedtime   levothyroxine 50 mcg tablet  Self Yes No   Sig: Take 50 mcg by mouth daily   lidocaine (LIDODERM) 5 %   Yes No   loratadine (CLARITIN) 10 mg tablet  Self Yes No   Sig: Take 10 mg by mouth daily   melatonin 3 mg   No No   Sig: Take 1 tablet (3 mg total) by mouth daily at bedtime   nystatin (MYCOSTATIN) powder  Self No No   Sig: Apply topically 2 (two) times a day   ondansetron (ZOFRAN-ODT) 8 mg disintegrating tablet   Yes No   pantoprazole (PROTONIX) 40 mg tablet   Yes No   Sig: take 1 tablet by mouth twice a day   polyethylene glycol (MIRALAX) 17 g packet   No No   Sig: Take 17 g by mouth daily for 7 days   polyethylene glycol (MIRALAX) 17 g packet   No No   Sig: Take 17 g by mouth daily   pregabalin (LYRICA) 50 mg capsule   No No   Sig: Take 1 PO daily, take 1 PO additionally directly have HD on HD days senna (SENOKOT) 8 6 mg  Self No No   Sig: Take 2 tablets (17 2 mg total) by mouth daily at bedtime as needed for constipation   sertraline (ZOLOFT) 50 mg tablet  Self No No   Sig: Take 1 tablet (50 mg total) by mouth daily   Patient taking differently: Take 75 mg by mouth in the morning  sevelamer (RENAGEL) 800 mg tablet   No No   Sig: Take 2 tablets (1,600 mg total) by mouth 3 (three) times a day with meals   torsemide (DEMADEX) 100 mg tablet   No No   Sig: Take 1 tablet (100 mg total) by mouth 4 (four) times a week On Sunday, Monday, Wednesday, Friday   Patient taking differently: Take 100 mg by mouth daily On Sunday, Monday, Wednesday, Friday (pt states she takes it everyday)   urea (CARMOL) 40 %   No No   Sig: APPLY TO AFFECTED AREA TOPICALLY EVERY DAY   warfarin (COUMADIN) 3 mg tablet  Self No No   Sig: Take 3 tablets (9 mg total) by mouth daily Takes 9 mg Monday Sat      Facility-Administered Medications: None       Allergies   Allergen Reactions    Iodinated Diagnostic Agents Itching    Keflex [Cephalexin] Hives    Eggs Or Egg-Derived Products - Food Allergy Rash    Wound Dressing Adhesive Rash       Objective   Temp:  [97 9 °F (36 6 °C)-98 1 °F (36 7 °C)] 98 1 °F (36 7 °C)  HR:  [] 105  Resp:  [16-22] 17  BP: ()/() 156/60    Intake/Output Summary (Last 24 hours) at 8/1/2022 1611  Last data filed at 8/1/2022 1130  Gross per 24 hour   Intake 600 ml   Output 2450 ml   Net -1850 ml       Physical Exam  Vitals reviewed  Constitutional:       General: She is not in acute distress  Appearance: Normal appearance  She is not ill-appearing or toxic-appearing  HENT:      Head: Normocephalic and atraumatic  Nose: Nose normal  No congestion or rhinorrhea  Mouth/Throat:      Mouth: Mucous membranes are moist    Eyes:      Extraocular Movements: Extraocular movements intact  Cardiovascular:      Rate and Rhythm: Normal rate     Pulmonary:      Effort: Pulmonary effort is normal  No tachypnea, bradypnea or respiratory distress  Breath sounds: No wheezing, rhonchi or rales  Abdominal:      General: There is no distension  Palpations: Abdomen is soft  Tenderness: There is no abdominal tenderness  Musculoskeletal:         General: No tenderness  Normal range of motion  Cervical back: Normal range of motion  Right lower leg: Edema present  Left lower leg: Edema present  Skin:     General: Skin is warm and dry  Coloration: Skin is not pale  Findings: No rash  Neurological:      Mental Status: She is alert and oriented to person, place, and time  Mental status is at baseline  Sensory: Sensation is intact  No sensory deficit  Motor: Motor function is intact  No weakness or tremor  Psychiatric:         Attention and Perception: Attention normal          Mood and Affect: Mood normal          Speech: Speech normal          Behavior: Behavior normal  Behavior is cooperative  Lab Results:   I have personally reviewed pertinent labs  , CBC:   Lab Results   Component Value Date    WBC 4 89 08/01/2022    HGB 8 1 (L) 08/01/2022    HCT 25 4 (L) 08/01/2022    MCV 99 (H) 08/01/2022     (L) 08/01/2022    MCH 31 6 08/01/2022    MCHC 31 9 08/01/2022    RDW 14 6 08/01/2022    MPV 11 1 08/01/2022   , CMP:   Lab Results   Component Value Date    SODIUM 138 08/01/2022    K 5 3 08/01/2022     08/01/2022    CO2 31 08/01/2022    BUN 39 (H) 08/01/2022    CREATININE 7 92 (H) 08/01/2022    CALCIUM 8 5 08/01/2022    EGFR 5 08/01/2022   , BMP:  Lab Results   Component Value Date    SODIUM 138 08/01/2022    K 5 3 08/01/2022     08/01/2022    CO2 31 08/01/2022    BUN 39 (H) 08/01/2022    CREATININE 7 92 (H) 08/01/2022    GLUC 87 08/01/2022    CALCIUM 8 5 08/01/2022    AGAP 6 08/01/2022    EGFR 5 08/01/2022     Imaging Studies: I have personally reviewed pertinent reports          Please note that the APS provides consultative services regarding pain management only  With the exception of ketamine and epidural infusions and except when indicated, final decisions regarding starting or changing doses of analgesic medications are at the discretion of the consulting service  Off hours consultation and/or medication management is generally not available      LOIS Mooney  Acute Pain Service

## 2022-08-01 NOTE — ASSESSMENT & PLAN NOTE
· Patient complains of low back pain with bilateral lower extremity radiculopathy, right greater than left  · Patient was seen by ID and Neurosurgery during recent admission for abnormal MRI of the lumbar spine and concern for possible discitis/OM but was felt that this was unlikely  · Neurosurgery following this admission, appreciate their ongoing recommendations  · Lumbar flexion/extension XR orderfed  · Plan for surgery vs outpatient PT with close follow up per Neurosurgery recs  · Repeat MRI L-spine this admission revealed findings suggestive of subacute L4-5 OM with lesser degree discitis, mild to moderate degenerative canal stenosis at L4-5, moderate right and mild left foraminal stenosis at this level  · ID consulted - being monitored off abx  Defer ?percutaneous biopsy for culture to Neurosurgery  · CRP elevated but lower than last admission  · Patient on multimodal pain regimen   APS consulted, pain control per their recs  · Follows with Pain Management as outpatient

## 2022-08-01 NOTE — ASSESSMENT & PLAN NOTE
Lab Results   Component Value Date    EGFR 5 08/01/2022    EGFR 7 07/31/2022    EGFR 4 07/30/2022    CREATININE 7 92 (H) 08/01/2022    CREATININE 5 99 (H) 07/31/2022    CREATININE 8 21 (H) 07/30/2022   · Nephrology following for management of dialysis 54 y/o Left handed male with PMHx of HTN non-compliant with medication, was found down by daughters 6/17, brought to [facility] where CTH demonstrated R parietal IPH. Per daughters he was awake, alert, but having difficulty speaking, while at [facility] per report was originally awake, alert, then became more lethargic and so was intubated and transferred. He was placed on Cardene prior to transfer, SBP ~190 at OSH. Admitted to stroke service for further workup.     Impression:  1) Right frontotemporal nontraumatic intracerebral hemorrhage with vasogenic edema and brainstem compression due to uncontrolled HTN  2) Pneumonia     NEURO: Neurologically with overall improvement- follow up CT as noted above: no noted acute changes, cerebral edema with mass effect and brain compression, maintain normotensive SBP goal <140mmHg , home statin if applicable, MRI Brain w/w/o contrast 4-6 week follow up, Physical therapy/OT eval AR.     ANTITHROMBOTIC THERAPY: None in the setting of ICH and no specific indications at this time     PULMONARY: CXR clear, protecting airway, hypoxemia noted previously,  presumably secondary to PNA- pulm consult appreciated, wean O2 as tolerated, continue incentive spirometry as tolerated, OOB to chair, continue to monitor closely. Ddimer 990. CTPA 6/23 showed no pulmonary embolism. Patchy bilateral lower lobe opacities, left greater than right, are likely secondary to pneumonia- started on Unasyn per ID.    CARDIOVASCULAR: TTE performed on 6/2018 shows EF 65% without any valvular abnormalities, normal LVSF, Continue cardiac monitoring- no events noted ; BP uncontrolled previously-appears to be improving, Cardiology consult with Dr. Freeman: recommended to increase Labetalol and monitor.                               SBP goal: <160/90    GASTROINTESTINAL:  dysphagia screen failed S&S consulted s/p MBS on 6/22- Recommended dysphagia diet; Calorie count ordered; Aspiration precautions reinforced; consider repeat MBS pending clinical course for diet advancement.      Diet: Dysphagia 1 diet with honey thick liquid     RENAL: BUN/Cr without acute change, good urine output      Na Goal: Greater than  135     Caicedo: No    HEMATOLOGY: H/H without acute change, Platelets 216; no signs or symptoms of active bleeding, LE doppler no DVT noted     DVT ppx: Lovenox     ID: afebrile, leukocytosis resolved, CTPA shows LLL PNA;  Blood cultures 6/22 NGTD, Blood cultures NGTD on 6/18, Bronch showed normal nellie, UA negative, RVP/Throat culture negative, Urine legionella negative. He was treated empirically with 1 dose of IV Zosyn, Tylenol PRN temp >38C, ID consult appreciated: 7-10 day course abx. May change Unasyn to Augmentin for d/c.     DISPOSITION: AR     CORE MEASURES:        Admission NIHSS:      TPA: [] YES [X] NO      LDL/HDL: 118/61     Depression Screen: 0     Statin Therapy: not indicated      Dysphagia Screen: [] PASS [x] FAIL     Smoking [] YES [x] NO      Afib [] YES [x] NO     Stroke Education [x] YES [] NO 56 y/o Left handed male with PMHx of HTN non-compliant with medication, was found down by daughters 6/17, brought to [facility] where CTH demonstrated R parietal IPH. Per daughters he was awake, alert, but having difficulty speaking, while at [facility] per report was originally awake, alert, then became more lethargic and so was intubated and transferred. He was placed on Cardene prior to transfer, SBP ~190 at OSH. Admitted to stroke service for further workup.     Impression:  1) Right frontotemporal nontraumatic intracerebral hemorrhage with vasogenic edema and brainstem compression due to uncontrolled HTN  2) Pneumonia     NEURO: Neurologically with overall improvement- follow up CT as noted above: no noted acute changes, cerebral edema with mass effect and brain compression, maintain normotensive SBP goal <140mmHg , home statin if applicable, MRI Brain w/w/o contrast with early signs of herniation- patient clincially stable without acute neurologic change therefore no further inpatient workup- will recommend repeat MRI in 4-6 weeks, Physical therapy/OT eval AR.     ANTITHROMBOTIC THERAPY: None in the setting of ICH and no specific indications at this time     PULMONARY: CXR clear, protecting airway, hypoxemia noted previously,  presumably secondary to PNA- pulm consult appreciated, wean O2 as tolerated, continue incentive spirometry as tolerated, OOB to chair, continue to monitor closely. Ddimer 990. CTPA 6/23 showed no pulmonary embolism. Patchy bilateral lower lobe opacities, left greater than right, are likely secondary to pneumonia- started on Unasyn per ID.    CARDIOVASCULAR: TTE performed on 6/2018 shows EF 65% without any valvular abnormalities, normal LVSF, Continue cardiac monitoring- no events noted ; BP uncontrolled previously-appears to be improving, Cardiology consult with Dr. Freeman: recommended to increase Labetalol and monitor.                               SBP goal: <160/90    GASTROINTESTINAL:  dysphagia screen failed S&S consulted s/p MBS on 6/22- Recommended dysphagia diet; Calorie count ordered; Aspiration precautions reinforced; consider repeat MBS pending clinical course for diet advancement.      Diet: Dysphagia 1 diet with honey thick liquid     RENAL: BUN/Cr without acute change, good urine output      Na Goal: Greater than  135     Caicedo: No    HEMATOLOGY: H/H without acute change, Platelets 216; no signs or symptoms of active bleeding, LE doppler no DVT noted     DVT ppx: Lovenox     ID: afebrile, leukocytosis resolved, CTPA shows LLL PNA;  Blood cultures 6/22 NGTD, Blood cultures NGTD on 6/18, Bronch showed normal nellie, UA negative, RVP/Throat culture negative, Urine legionella negative. He was treated empirically with 1 dose of IV Zosyn, Tylenol PRN temp >38C, ID consult appreciated: 7-10 day course abx. May change Unasyn to Augmentin for d/c.     DISPOSITION: AR     CORE MEASURES:        Admission NIHSS:      TPA: [] YES [X] NO      LDL/HDL: 118/61     Depression Screen: 0     Statin Therapy: not indicated      Dysphagia Screen: [] PASS [x] FAIL     Smoking [] YES [x] NO      Afib [] YES [x] NO     Stroke Education [x] YES [] NO 54 y/o Left handed male with PMHx of HTN non-compliant with medication, was found down by daughters 6/17, brought to [facility] where CTH demonstrated R parietal IPH. Per daughters he was awake, alert, but having difficulty speaking, while at [facility] per report was originally awake, alert, then became more lethargic and so was intubated and transferred. He was placed on Cardene prior to transfer, SBP ~190 at OSH. Admitted to stroke service for further workup.     Impression:  1) Right frontotemporal nontraumatic intracerebral hemorrhage with vasogenic edema and brainstem compression due to uncontrolled HTN  2) Pneumonia     NEURO: Neurologically with overall improvement- follow up CT as noted above: no noted acute changes, cerebral edema with mass effect and brain compression. Given patients overall clinical stability will defer intervention on CT findings. maintain normotensive SBP goal <140mmHg , home statin if applicable, MRI Brain w/w/o contrast with early signs of herniation- patient clincially stable without acute neurologic change therefore no further inpatient workup- will recommend repeat MRI in 4-6 weeks, Physical therapy/OT eval AR.     ANTITHROMBOTIC THERAPY: None in the setting of ICH and no specific indications at this time     PULMONARY: CXR clear, protecting airway, hypoxemia noted previously,  presumably secondary to PNA- pulm consult appreciated, wean O2 as tolerated, continue incentive spirometry as tolerated, OOB to chair, continue to monitor closely. Ddimer 990. CTPA 6/23 showed no pulmonary embolism. Patchy bilateral lower lobe opacities, left greater than right, are likely secondary to pneumonia- started on Unasyn per ID.    CARDIOVASCULAR: TTE performed on 6/2018 shows EF 65% without any valvular abnormalities, normal LVSF, Continue cardiac monitoring- no events noted ; BP uncontrolled previously-appears to be improving, Cardiology consult with Dr. Freeman: recommended to increase Labetalol and monitor.                               SBP goal: <160/90    GASTROINTESTINAL:  dysphagia screen failed S&S consulted s/p MBS on 6/22- Recommended dysphagia diet; Calorie count ordered; Aspiration precautions reinforced; consider repeat MBS pending clinical course for diet advancement.      Diet: Dysphagia 1 diet with honey thick liquid     RENAL: BUN/Cr without acute change, good urine output      Na Goal: Greater than  135     Caicedo: No    HEMATOLOGY: H/H without acute change, Platelets 216; no signs or symptoms of active bleeding, LE doppler no DVT noted     DVT ppx: Lovenox     ID: afebrile, leukocytosis resolved, CTPA shows LLL PNA;  Blood cultures 6/22 NGTD, Blood cultures NGTD on 6/18, Bronch showed normal nellie, UA negative, RVP/Throat culture negative, Urine legionella negative. He was treated empirically with 1 dose of IV Zosyn, Tylenol PRN temp >38C, ID consult appreciated: 7-10 day course abx. May change Unasyn to Augmentin for d/c.     DISPOSITION: AR     CORE MEASURES:        Admission NIHSS:      TPA: [] YES [X] NO      LDL/HDL: 118/61     Depression Screen: 0     Statin Therapy: not indicated      Dysphagia Screen: [] PASS [x] FAIL     Smoking [] YES [x] NO      Afib [] YES [x] NO     Stroke Education [x] YES [] NO

## 2022-08-01 NOTE — TELEPHONE ENCOUNTER
08/05/2022-CALLED PT, LEFT MESSAGE ON MACHINE FOR CALL BACK TO RESCHEDULE 4 WK (FROM 08/05) HOSP F/U SNPX W/DERRICK W/LUMBAR SPINE XRAY    Maxi Livingston PA-C   Yes please  Reschedule as this was an appointment given during her prior admission         ----- Message -----   From: Maxi Livingston PA-C   Sent: 8/4/2022   3:56 PM EDT    Subject: hosp f/u                                       May I please have a 4 week SNPX with Radha Coker  Patient will have lumbar spine flex/ex imaging ordered now to be completed prior to appointment          08/05/2022-CALLED PT, LEFT MESSAGE ON MACHINE CONFIRMING 08/11/2022 APT IN [place] AND TO HAVE XRAY COMPLETED PRIOR     08/04/2022-PT STILL IN HOSPITAL    08/02/2022-PT Anny 25    08/01/2022-PT Anny 25  08/11/2022 APT W/ LUMBAR SPINE XRAY

## 2022-08-01 NOTE — PLAN OF CARE
Plan an extra treatment due to MRI with [facility], 4 hr treatment on a 2 k bath for a serum k of 4 7 on 7/31/22  For uf of 3 5 kg to get to EDW  Post-Dialysis RN Treatment Note    Blood Pressure:  Pre 190/98 mm/Hg  Post 193/94 mmHg   EDW  124 kg    Weight:  Pre 128 3 kg   Post 126 7 kg   Mode of weight measurement: Other chair scale   Volume Removed  1900 ml    Treatment duration 210 minutes    NS given  Yes, for hypotension    Treatment shortened? No   Medications given during Rx Xanax 0 25 mg   Estimated Kt/V  1 18   Access type: AV fistula   Access Issues: No    Report called to primary nurse   Yes  Joe Gallegos RN  Patient removed her IV in her hand, started crying and holding onto our  hands stating she is afraid  When asked what she is afraid of stated that she is dying  Emotional support given  Patient alert and oriented  For her regular schedule treatment tomorrow    Problem: METABOLIC, FLUID AND ELECTROLYTES - ADULT  Goal: Electrolytes maintained within normal limits  Description: INTERVENTIONS:  - Monitor labs and assess patient for signs and symptoms of electrolyte imbalances  - Administer electrolyte replacement as ordered  - Monitor response to electrolyte replacements, including repeat lab results as appropriate  - Instruct patient on fluid and nutrition as appropriate  Outcome: Progressing  Goal: Fluid balance maintained  Description: INTERVENTIONS:  - Monitor labs   - Monitor I/O and WT  - Instruct patient on fluid and nutrition as appropriate  - Assess for signs & symptoms of volume excess or deficit  Outcome: Progressing

## 2022-08-02 ENCOUNTER — TELEPHONE (OUTPATIENT)
Dept: PAIN MEDICINE | Facility: CLINIC | Age: 57
End: 2022-08-02

## 2022-08-02 ENCOUNTER — APPOINTMENT (INPATIENT)
Dept: DIALYSIS | Facility: HOSPITAL | Age: 57
DRG: 551 | End: 2022-08-02
Payer: MEDICARE

## 2022-08-02 LAB
ANION GAP SERPL CALCULATED.3IONS-SCNC: 5 MMOL/L (ref 4–13)
BUN SERPL-MCNC: 24 MG/DL (ref 5–25)
CALCIUM SERPL-MCNC: 9 MG/DL (ref 8.3–10.1)
CHLORIDE SERPL-SCNC: 99 MMOL/L (ref 96–108)
CO2 SERPL-SCNC: 30 MMOL/L (ref 21–32)
CREAT SERPL-MCNC: 5.7 MG/DL (ref 0.6–1.3)
ERYTHROCYTE [DISTWIDTH] IN BLOOD BY AUTOMATED COUNT: 14.3 % (ref 11.6–15.1)
GFR SERPL CREATININE-BSD FRML MDRD: 7 ML/MIN/1.73SQ M
GLUCOSE SERPL-MCNC: 110 MG/DL (ref 65–140)
GLUCOSE SERPL-MCNC: 133 MG/DL (ref 65–140)
GLUCOSE SERPL-MCNC: 133 MG/DL (ref 65–140)
GLUCOSE SERPL-MCNC: 182 MG/DL (ref 65–140)
GLUCOSE SERPL-MCNC: 83 MG/DL (ref 65–140)
GLUCOSE SERPL-MCNC: 87 MG/DL (ref 65–140)
GLUCOSE SERPL-MCNC: 88 MG/DL (ref 65–140)
GLUCOSE SERPL-MCNC: 90 MG/DL (ref 65–140)
HCT VFR BLD AUTO: 24.2 % (ref 34.8–46.1)
HGB BLD-MCNC: 7.7 G/DL (ref 11.5–15.4)
INR PPP: 2.5 (ref 0.84–1.19)
MCH RBC QN AUTO: 31.7 PG (ref 26.8–34.3)
MCHC RBC AUTO-ENTMCNC: 31.8 G/DL (ref 31.4–37.4)
MCV RBC AUTO: 100 FL (ref 82–98)
PHOSPHATE SERPL-MCNC: 5.3 MG/DL (ref 2.7–4.5)
PLATELET # BLD AUTO: 121 THOUSANDS/UL (ref 149–390)
PMV BLD AUTO: 10.1 FL (ref 8.9–12.7)
POTASSIUM SERPL-SCNC: 4.6 MMOL/L (ref 3.5–5.3)
PROTHROMBIN TIME: 27.3 SECONDS (ref 11.6–14.5)
RBC # BLD AUTO: 2.43 MILLION/UL (ref 3.81–5.12)
SODIUM SERPL-SCNC: 134 MMOL/L (ref 135–147)
WBC # BLD AUTO: 4.77 THOUSAND/UL (ref 4.31–10.16)

## 2022-08-02 PROCEDURE — 90935 HEMODIALYSIS ONE EVALUATION: CPT | Performed by: INTERNAL MEDICINE

## 2022-08-02 PROCEDURE — 80048 BASIC METABOLIC PNL TOTAL CA: CPT | Performed by: INTERNAL MEDICINE

## 2022-08-02 PROCEDURE — 99232 SBSQ HOSP IP/OBS MODERATE 35: CPT | Performed by: INTERNAL MEDICINE

## 2022-08-02 PROCEDURE — 84100 ASSAY OF PHOSPHORUS: CPT | Performed by: INTERNAL MEDICINE

## 2022-08-02 PROCEDURE — 85027 COMPLETE CBC AUTOMATED: CPT | Performed by: INTERNAL MEDICINE

## 2022-08-02 PROCEDURE — 99232 SBSQ HOSP IP/OBS MODERATE 35: CPT | Performed by: NEUROLOGICAL SURGERY

## 2022-08-02 PROCEDURE — 99233 SBSQ HOSP IP/OBS HIGH 50: CPT | Performed by: INTERNAL MEDICINE

## 2022-08-02 PROCEDURE — 85610 PROTHROMBIN TIME: CPT | Performed by: PHYSICIAN ASSISTANT

## 2022-08-02 PROCEDURE — 99232 SBSQ HOSP IP/OBS MODERATE 35: CPT

## 2022-08-02 PROCEDURE — 82948 REAGENT STRIP/BLOOD GLUCOSE: CPT

## 2022-08-02 RX ADMIN — WARFARIN SODIUM 9 MG: 3 TABLET ORAL at 17:16

## 2022-08-02 RX ADMIN — OXYCODONE HYDROCHLORIDE AND ACETAMINOPHEN 2 TABLET: 5; 325 TABLET ORAL at 17:21

## 2022-08-02 RX ADMIN — NEPHROCAP 1 CAPSULE: 1 CAP ORAL at 17:16

## 2022-08-02 RX ADMIN — PREGABALIN 50 MG: 50 CAPSULE ORAL at 13:19

## 2022-08-02 RX ADMIN — SEVELAMER HYDROCHLORIDE 1600 MG: 800 TABLET ORAL at 17:16

## 2022-08-02 RX ADMIN — LEVOTHYROXINE SODIUM 50 MCG: 50 TABLET ORAL at 05:45

## 2022-08-02 RX ADMIN — DICYCLOMINE HYDROCHLORIDE 10 MG: 10 CAPSULE ORAL at 21:31

## 2022-08-02 RX ADMIN — SEVELAMER HYDROCHLORIDE 1600 MG: 800 TABLET ORAL at 13:19

## 2022-08-02 RX ADMIN — HYDROMORPHONE HYDROCHLORIDE 0.2 MG: 0.2 INJECTION, SOLUTION INTRAMUSCULAR; INTRAVENOUS; SUBCUTANEOUS at 13:29

## 2022-08-02 RX ADMIN — TORSEMIDE 100 MG: 20 TABLET ORAL at 13:20

## 2022-08-02 RX ADMIN — DICYCLOMINE HYDROCHLORIDE 10 MG: 10 CAPSULE ORAL at 13:19

## 2022-08-02 RX ADMIN — ONDANSETRON 4 MG: 2 INJECTION INTRAMUSCULAR; INTRAVENOUS at 17:30

## 2022-08-02 RX ADMIN — MENTHOL, METHYL SALICYLATE 1 APPLICATION: 10; 15 CREAM TOPICAL at 21:35

## 2022-08-02 RX ADMIN — PANTOPRAZOLE SODIUM 40 MG: 40 TABLET, DELAYED RELEASE ORAL at 05:45

## 2022-08-02 RX ADMIN — HYDROMORPHONE HYDROCHLORIDE 0.2 MG: 0.2 INJECTION, SOLUTION INTRAMUSCULAR; INTRAVENOUS; SUBCUTANEOUS at 07:56

## 2022-08-02 RX ADMIN — CARVEDILOL 25 MG: 25 TABLET, FILM COATED ORAL at 13:19

## 2022-08-02 RX ADMIN — LIDOCAINE 5% 1 PATCH: 700 PATCH TOPICAL at 13:23

## 2022-08-02 RX ADMIN — INSULIN GLARGINE 12 UNITS: 100 INJECTION, SOLUTION SUBCUTANEOUS at 21:32

## 2022-08-02 RX ADMIN — DICYCLOMINE HYDROCHLORIDE 10 MG: 10 CAPSULE ORAL at 17:18

## 2022-08-02 RX ADMIN — CINACALCET 30 MG: 30 TABLET, FILM COATED ORAL at 13:19

## 2022-08-02 RX ADMIN — ATORVASTATIN CALCIUM 20 MG: 20 TABLET, FILM COATED ORAL at 17:16

## 2022-08-02 RX ADMIN — OXYCODONE HYDROCHLORIDE AND ACETAMINOPHEN 2 TABLET: 5; 325 TABLET ORAL at 11:27

## 2022-08-02 RX ADMIN — SERTRALINE HYDROCHLORIDE 75 MG: 50 TABLET ORAL at 13:20

## 2022-08-02 RX ADMIN — SENNOSIDES 17.2 MG: 8.6 TABLET, FILM COATED ORAL at 21:31

## 2022-08-02 RX ADMIN — OXYCODONE HYDROCHLORIDE AND ACETAMINOPHEN 2 TABLET: 5; 325 TABLET ORAL at 05:44

## 2022-08-02 RX ADMIN — LORATADINE 10 MG: 10 TABLET ORAL at 13:19

## 2022-08-02 RX ADMIN — CARVEDILOL 25 MG: 25 TABLET, FILM COATED ORAL at 17:16

## 2022-08-02 RX ADMIN — ONDANSETRON 4 MG: 2 INJECTION INTRAMUSCULAR; INTRAVENOUS at 07:56

## 2022-08-02 NOTE — TELEPHONE ENCOUNTER
----- Message from [employer] sent at 8/2/2022  7:08 AM EDT -----  Patient's baseline UDS positive for THC  Per our opioid policy, patient is non opioid therapy from our practice secondary to marijuana use

## 2022-08-02 NOTE — PROGRESS NOTES
Progress Note - Acute Pain Service    Cam Meeks 56 y o  female MRN: [id number]  Unit/Bed#: [facility] 168-06 Encounter: [id number]      Assessment:   Principal Problem:    Intractable back pain  Active Problems:    Essential hypertension    History of venous thromboembolism    ESRD on hemodialysis    Morbid obesity    Type 2 diabetes mellitus ([address] )    Cam Meeks is a 56 y o  female presented on 7/29 with intractable back pain  Past medical history significant for ESRD on HD, HTN, DMII, and recent Covid infection  She was hospitalized earlier this month after a fall at HD and underwent an MRI which suggested possible diskitis/osteomyelitis  At that time she was evaluated by neurosurgery who felt there was low likelihood of infection  She was recommended for repeat imaging outpatient and was ultimately discharged, before returning on 7/29  Repeat MRI this hospitalization revealed similar findings and she is awaiting formal plan from neurosurgery  ID was consulting and is monitoring patient off of antibiotics  APS was consulted for management of acute/chronic back pain, she currently follows with [facility] spine and pain as an outpatient  Patient in dialysis at time of examination  She appears comfortable, and reports a mild improvement with pain control with use of percocet compared to oxycodone and scheduled tylenol  Will not recommend any medication changes today while awaiting plan from neurosurgery  Plan:   · Continue Percocet 1 tablet every 6 hours PRN, for moderate pain  · Continue Percocet 2 tablets every 6 hours PRN, for severe pain  · Continue IV Dilaudid 0 2 mg every 4 hours PRN, for breakthrough pain  · Continue Lyrica 50 mg daily  Estimated Creatinine Clearance: 15 4 mL/min (A) (by C-G formula based on SCr of 5 7 mg/dL (H))  · Continue lidocaine patch 5%, 12 hours on/12 hours off, to lower back    Bowel regimen:  MiraLax daily  Senna at [facility]      APS will continue to follow   Please contact Acute Pain Service - [facility] via [employer] from [phone] with additional questions or concerns  See Aishwarya or Ricki for additional contacts and after hours information  Pain History  Current pain location(s):  Low back  Pain Scale:   7  Quality:  Aching  24 hour history:  Patient reports she did not sleep well overnight  Reports about a 25% improvement in pain control with use of Percocet  Having a lot of anxiety awaiting treatment plan  Tolerating current medication regimen  No complaints of nausea/vomiting, constipation/diarrhea      Opioid requirement previous 24 hours:  IV Dilaudid 0 6 mg  Percocet 6 tablets (2 tabs x 3)      Meds/Allergies   all current active meds have been reviewed and current meds:   Current Facility-Administered Medications   Medication Dose Route Frequency    albuterol (PROVENTIL HFA,VENTOLIN HFA) inhaler 2 puff  2 puff Inhalation Q6H PRN    ALPRAZolam (XANAX) tablet 0 25 mg  0 25 mg Oral BID PRN    atorvastatin (LIPITOR) tablet 20 mg  20 mg Oral QPM    b complex-vitamin C-folic acid (NEPHROCAPS) capsule 1 capsule  1 capsule Oral Daily With Dinner    carvedilol (COREG) tablet 25 mg  25 mg Oral BID    cinacalcet (SENSIPAR) tablet 30 mg  30 mg Oral Daily    dicyclomine (BENTYL) capsule 10 mg  10 mg Oral 4x Daily (AC & HS)    HYDROmorphone HCl (DILAUDID) injection 0 2 mg  0 2 mg Intravenous Q4H PRN    insulin glargine (LANTUS) subcutaneous injection 12 Units 0 12 mL  12 Units Subcutaneous HS    insulin lispro (HumaLOG) 100 units/mL subcutaneous injection 1-5 Units  1-5 Units Subcutaneous TID AC    insulin lispro (HumaLOG) 100 units/mL subcutaneous injection 1-5 Units  1-5 Units Subcutaneous HS    levothyroxine tablet 50 mcg  50 mcg Oral Daily    lidocaine (LIDODERM) 5 % patch 1 patch  1 patch Topical Daily    loratadine (CLARITIN) tablet 10 mg  10 mg Oral Daily    LORazepam (ATIVAN) injection 1 mg  1 mg Intravenous Once    melatonin tablet 3 mg  3 mg Oral HS    menthol-methyl salicylate (BENGAY) 23-55 % cream   Apply externally 4x Daily PRN    nystatin (MYCOSTATIN) powder   Topical BID    ondansetron (ZOFRAN) injection 4 mg  4 mg Intravenous Q4H PRN    oxyCODONE-acetaminophen (PERCOCET) 5-325 mg per tablet 1 tablet  1 tablet Oral Q6H PRN    Or    oxyCODONE-acetaminophen (PERCOCET) 5-325 mg per tablet 2 tablet  2 tablet Oral Q6H PRN    pantoprazole (PROTONIX) EC tablet 40 mg  40 mg Oral Early Morning    polyethylene glycol (MIRALAX) packet 17 g  17 g Oral Daily    pregabalin (LYRICA) capsule 50 mg  50 mg Oral Daily    saliva substitute (MOUTH KOTE) mucosal solution 5 spray  5 spray Mouth/Throat 4x Daily PRN    senna (SENOKOT) tablet 17 2 mg  2 tablet Oral HS    sertraline (ZOLOFT) tablet 75 mg  75 mg Oral Daily    sevelamer (RENAGEL) tablet 1,600 mg  1,600 mg Oral TID With Meals    torsemide (DEMADEX) tablet 100 mg  100 mg Oral Daily    warfarin (COUMADIN) tablet 9 mg  9 mg Oral Daily (warfarin)       Allergies   Allergen Reactions    Iodinated Diagnostic Agents Itching    Keflex [Cephalexin] Hives    Eggs Or Egg-Derived Products - Food Allergy Rash    Wound Dressing Adhesive Rash       Objective     Temp:  [97 7 °F (36 5 °C)-98 7 °F (37 1 °C)] 98 1 °F (36 7 °C)  HR:  [73-87] 80  Resp:  [16-20] 20  BP: ()/(44-81) 141/48    Physical Exam  Vitals reviewed  Constitutional:       General: She is not in acute distress  Appearance: Normal appearance  She is not ill-appearing or toxic-appearing  HENT:      Head: Normocephalic and atraumatic  Nose: Nose normal  No congestion or rhinorrhea  Mouth/Throat:      Mouth: Mucous membranes are moist    Eyes:      Extraocular Movements: Extraocular movements intact  Cardiovascular:      Rate and Rhythm: Normal rate  Pulmonary:      Effort: Pulmonary effort is normal  No tachypnea, bradypnea or respiratory distress  Breath sounds: No wheezing, rhonchi or rales     Abdominal:      General: There is no distension  Palpations: Abdomen is soft  Tenderness: There is no abdominal tenderness  Musculoskeletal:         General: No tenderness  Normal range of motion  Cervical back: Normal range of motion  Right lower leg: Edema present  Left lower leg: Edema present  Skin:     General: Skin is warm and dry  Coloration: Skin is not pale  Neurological:      Mental Status: She is alert and oriented to person, place, and time  Mental status is at baseline  Sensory: Sensation is intact  No sensory deficit  Motor: Motor function is intact  No weakness or tremor  Psychiatric:         Attention and Perception: Attention normal          Mood and Affect: Mood is anxious  Speech: Speech normal          Behavior: Behavior normal  Behavior is cooperative  Lab Results:   Results from last 7 days   Lab Units 08/02/22  0845   WBC Thousand/uL 4 77   HEMOGLOBIN g/dL 7 7*   HEMATOCRIT % 24 2*   PLATELETS Thousands/uL 121*      Results from last 7 days   Lab Units 08/02/22  0845   POTASSIUM mmol/L 4 6   CHLORIDE mmol/L 99   CO2 mmol/L 30   BUN mg/dL 24   CREATININE mg/dL 5 70*   CALCIUM mg/dL 9 0         Please note that the APS provides consultative services regarding pain management only  With the exception of ketamine and epidural infusions and except when indicated, final decisions regarding starting or changing doses of analgesic medications are at the discretion of the consulting service  Off hours consultation and/or medication management is generally not available      LOIS Quintero  Acute Pain Service

## 2022-08-02 NOTE — PROGRESS NOTES
Pradeep Rendon PROGRESS NOTE   Noa Jackson 56 y o  female MRN: [id number]  Unit/Bed#: [facility] 290-17 Encounter: [id number]  Reason for Consult: ESRD on HD     ASSESSMENT and PLAN:  77-year-old female with history of end-stage renal disease on HD on TTS schedule currently admitted for worsening of lower back pain  We are consulted for evaluation management of ESRD  1  ESRD on HD  - Schedule: T/T/S at [address]  - Last HD: 08/02/2022 to clear Gadolinium contrast  - HD today on schedule   - Access: Left forearm AV fistula    HEMODIALYSIS PROCEDURE NOTE  The patient was seen and examined on hemodialysis  Time: 3 5 hours  Sodium: 138 Blood flow: 400   Dialyzer: F160 Potassium: 2 Dialysate flow: 600   Access: L forearm AVF Bicarbonate: 35 Ultrafiltration goal: 2-3 L   Medications on HD: None      2  HTN/Volume   - Medications: Carvedilol 25 mg b i d and Torsemide 100 mg daily   - UF: We will do 2-3 L UF as tolerated on HD   - Nifedipine stopped to allow ultrafiltration on dialysis    3  Anemia   - Goal Hb 10-11, currently below goal  - Outpatient medications: IV iron 50 mg every week, no EPOGEN due to history of DVT/PE  - Transfusion threshold Hb < 7   Lab Results   Component Value Date    HGB 7 7 (L) 08/02/2022     4  Electrolytes   - We are using 2 mEq potassium and 35 mEq bicarbonate bath on dialysis today  Lab Results   Component Value Date     10/11/2015    K 4 6 08/02/2022    CL 99 08/02/2022    CO2 30 08/02/2022     5  Bone Mineral Disease   - Medications: Sensipar 30 mg daily, Renvela 2 tablets t i d  with meals  - Goal serum phosphate < 5 5, currently at goal   Lab Results   Component Value Date    CALCIUM 9 0 08/02/2022    PHOS 5 3 (H) 08/02/2022      6   Acute on chronic back pain  - MRI 08/01/2022: Findings most suggestive for subacute L4-5 osteomyelitis with lesser degree discitis  - Blood cultures negative to date, observation off antibiotics per infectious disease   - Final plan regarding surgery versus conservative management pending     DISPOSITION:  - HD today     SUBJECTIVE / 24H INTERVAL HISTORY:  Patient seen and examined on hemodialysis this morning  She denies chest pain or dyspnea  She continues to have lower back pain with radiation into her legs       OBJECTIVE:  Current Weight: Weight - Scale: 130 kg (286 lb 9 6 oz)  Vitals:    08/02/22 0930 08/02/22 1000 08/02/22 1030 08/02/22 1045   BP: 98/67 (!) 88/68 (!) 86/44 97/81   BP Location:       Pulse: 80 80 77 87   Resp: 18 18 18 18   Temp:       TempSrc:       SpO2:       Weight:       Height:           Intake/Output Summary (Last 24 hours) at 8/2/2022 1047  Last data filed at 8/2/2022 1035  Gross per 24 hour   Intake 800 ml   Output 2450 ml   Net -1650 ml     General: Awake and alert   Eyes: Conjunctivae pink, sclera anicteric  ENT: Lips and mucous membranes moist  Neck: Supple   Chest: Clear to auscultation bilaterally   CVS: S1 & S2 present, normal rate, regular rhythm, no murmur  Abdomen: Soft, non-tender, non-distended, Bowel sounds normoactive  Extremities: Left transmetatarsal amputation, trace edema bilaterally   Skin: No rash  Neuro: Awake, alert and oriented  Psych: Mood and affect appropriate   Access: Left forearm AV fistula in use     Medications:    Current Facility-Administered Medications:     albuterol (PROVENTIL HFA,VENTOLIN HFA) inhaler 2 puff, 2 puff, Inhalation, Q6H PRN, Akshat East MD    ALPRAZolam (XANAX) tablet 0 25 mg, 0 25 mg, Oral, BID PRN, Akshat East MD, 0 25 mg at 08/01/22 2329    atorvastatin (LIPITOR) tablet 20 mg, 20 mg, Oral, QPM, Santo Ramirez MD, 20 mg at 08/01/22 1713    b complex-vitamin C-folic acid (NEPHROCAPS) capsule 1 capsule, 1 capsule, Oral, Daily With Antonia Nichols MD, 1 capsule at 08/01/22 1714    carvedilol (COREG) tablet 25 mg, 25 mg, Oral, BID, Akshat East MD, 25 mg at 08/01/22 1713    cinacalcet (SENSIPAR) tablet 30 mg, 30 mg, Oral, Daily, Santo Ramirez, MD, 30 mg at 08/01/22 1225    dicyclomine (BENTYL) capsule 10 mg, 10 mg, Oral, 4x Daily (AC & HS), Yousif Driscoll MD, 10 mg at 08/01/22 2238    HYDROmorphone HCl (DILAUDID) injection 0 2 mg, 0 2 mg, Intravenous, Q4H PRN, Lito Story PA-C, 0 2 mg at 08/02/22 0756    insulin glargine (LANTUS) subcutaneous injection 12 Units 0 12 mL, 12 Units, Subcutaneous, HS, Lito Story PA-C, 12 Units at 08/01/22 2238    insulin lispro (HumaLOG) 100 units/mL subcutaneous injection 1-5 Units, 1-5 Units, Subcutaneous, TID AC, 1 Units at 07/31/22 1657 **AND** Fingerstick Glucose (POCT), , , TID AC, Yousif Driscoll MD    insulin lispro (HumaLOG) 100 units/mL subcutaneous injection 1-5 Units, 1-5 Units, Subcutaneous, HS, Yousif Driscoll MD, 2 Units at 07/30/22 2100    levothyroxine tablet 50 mcg, 50 mcg, Oral, Daily, Yousif Driscoll MD, 50 mcg at 08/02/22 0545    lidocaine (LIDODERM) 5 % patch 1 patch, 1 patch, Topical, Daily, Yousif Driscoll MD, 1 patch at 08/01/22 1226    loratadine (CLARITIN) tablet 10 mg, 10 mg, Oral, Daily, Fely Ramirez MD, 10 mg at 08/01/22 1226    LORazepam (ATIVAN) injection 1 mg, 1 mg, Intravenous, Once, LOIS Cantu    melatonin tablet 3 mg, 3 mg, Oral, HS, Fely Ramirez MD, 3 mg at 08/01/22 2238    menthol-methyl salicylate (BENGAY) 65-59 % cream, , Apply externally, 4x Daily PRN, Ayse Bonner MD, 1 application at 91/55/62 2332    nystatin (MYCOSTATIN) powder, , Topical, BID, Yousif Driscoll MD, Given at 07/30/22 1716    ondansetron (ZOFRAN) injection 4 mg, 4 mg, Intravenous, Q4H PRN, Yousif Driscoll MD, 4 mg at 08/02/22 0756    oxyCODONE-acetaminophen (PERCOCET) 5-325 mg per tablet 1 tablet, 1 tablet, Oral, Q6H PRN **OR** oxyCODONE-acetaminophen (PERCOCET) 5-325 mg per tablet 2 tablet, 2 tablet, Oral, Q6H PRN, LOIS Hui, 2 tablet at 08/02/22 0544    pantoprazole (PROTONIX) EC tablet 40 mg, 40 mg, Oral, Early Morning, Fely Ramirez MD, 40 mg at 08/02/22 0545    polyethylene glycol (MIRALAX) packet 17 g, 17 g, Oral, Daily, Jaylon Angel MD    pregabalin (LYRICA) capsule 50 mg, 50 mg, Oral, Daily, Jaylon Angel MD, 50 mg at 08/01/22 1225    saliva substitute (MOUTH KOTE) mucosal solution 5 spray, 5 spray, Mouth/Throat, 4x Daily PRN, Kiersten CRONIN Rayna, LOIS    senna (SENOKOT) tablet 17 2 mg, 2 tablet, Oral, HS, Teodoro Patino Lyle, CRNP, 17 2 mg at 08/01/22 2238    sertraline (ZOLOFT) tablet 75 mg, 75 mg, Oral, Daily, Jaylon Angel MD, 75 mg at 08/01/22 1226    sevelamer (RENAGEL) tablet 1,600 mg, 1,600 mg, Oral, TID With Meals, Jaylon Angel MD, 1,600 mg at 08/01/22 1713    torsemide (DEMADEX) tablet 100 mg, 100 mg, Oral, Daily, Jaylon Angel MD, 100 mg at 08/01/22 1226    warfarin (COUMADIN) tablet 9 mg, 9 mg, Oral, Daily (warfarin), Jaylon Angel MD, 9 mg at 08/01/22 1716    Laboratory Results:  Results from last 7 days   Lab Units 08/02/22  0845 08/01/22  0758 07/31/22  0546 07/30/22  0901 07/29/22  1218   WBC Thousand/uL 4 77 4 89 4 57 5 16 4 67   HEMOGLOBIN g/dL 7 7* 8 1* 7 3* 7 4* 7 5*   HEMATOCRIT % 24 2* 25 4* 23 5* 24 2* 23 8*   PLATELETS Thousands/uL 121* 140* 122* 112* 114*   POTASSIUM mmol/L 4 6 5 3 4 7 5 0 5 3   CHLORIDE mmol/L 99 101 101 106 105   CO2 mmol/L 30 31 34* 30 30   BUN mg/dL 24 39* 30* 53* 47*   CREATININE mg/dL 5 70* 7 92* 5 99* 8 21* 6 83*   CALCIUM mg/dL 9 0 8 5 8 5 8 6 8 6   PHOSPHORUS mg/dL 5 3*  --   --   --   --

## 2022-08-02 NOTE — CASE MANAGEMENT
Case Management Assessment & Discharge Planning Note    Patient name Aubree Haver  Location [facility] 814/[facility] 779-75 MRN [id number]  : 1965 Date 2022       Current Admission Date: 2022  Current Admission Diagnosis:Intractable back pain   Patient Active Problem List    Diagnosis Date Noted    Intractable back pain 2022    Problem with vascular access 2022    Concern for Osteomyelitis/Discitis of lumbar region 2022    Fall 2022    Hypoxia 2022    COVID-19 2022    Right knee pain 2022    Constipation 2022    Pruritus 2021    Postop check 2021    Sigmoid diverticulitis 2021    Pneumonia 2021    Chronic anticoagulation 2021    Essential hypertension 2021    Arteriovenous fistula for hemodialysis in place, primary ([facility] ) 2021    Healthcare-associated pneumonia 2021    Right foot pain 2021    A-V fistula ([facility] ) 2021    Chronic pain syndrome 2021    Bilateral primary osteoarthritis of knee 2021    Bilateral patellofemoral syndrome 2021    Tinea unguium 2020    S/P foot surgery, right 2020    History of claustrophobia 2020    Morbid obesity 2020    Hyperlipidemia 2020    Diabetic poly[facility]uropathy [facility] with type 2 diabetes mellitus [facility] ) 2020    Encounter for diabetic foot exam ([place] [address] ) 2020    Corns and callosities 2020    History of transmetatarsal amputation of right foot ([facility] ) 2020    Flu-like symptoms 2020    Lumbar radiculopathy 2020    Low back pain with sciatica 2020    Hemodialysis-associated hypotension 2019    Hyponatremia 2019    Parenchymal renal hypertension 2019    Candidiasis of breast 2019    Hyperkalemia 2019    Anemia of chronic disease 2019    Disruption of internal surgical wound 2019    Dyspnea 2019    Secondary hyperparathyroidism of renal origin ([facility] ) 04/27/2019    Nausea and vomiting 04/26/2019    Meningioma ([place] [address] ) 04/18/2019    Concussion without loss of consciousness 03/15/2019    Colon cancer screening 03/05/2019    Gastroesophageal reflux disease 03/05/2019    Primary osteoarthritis of right knee 10/25/2018    Hemodialysis status ([place] [address] ) 10/23/2018    Knee pain 10/22/2018    Ambulatory dysfunction 10/22/2018    Anxiety disorder 04/06/2017    Acquired hypothyroidism 07/22/2016    Glaucoma 07/01/2016    ESRD on hemodialysis 07/01/2016    Abnormal uterine bleeding 02/15/2016    History of venous thromboembolism 02/14/2016    Pulmonary embolus ([place] [address] ) 10/30/2015    Cervical dysplasia 05/03/2015    Chronic endometritis 10/17/2014    Tinea pedis 10/02/2014    Benign neoplasm of skin 09/25/2014    Type 2 diabetes mellitus ([place] [address] ) 09/04/2014    Phlebitis and thrombophlebitis of superficial vessels of lower extremities 04/10/2014    Limb pain 11/25/2013    Mononeuritis of upper limb, unspecified laterality 11/25/2013    Legal blindness, as defined in [place] 21/02/2854    Complication from renal dialysis device 04/22/2013    Essential hypertension 10/15/2012    Cardiomyopathy ([place] [address] ) 09/26/2012    Esophageal reflux 08/20/2012    Allergic rhinitis 08/20/2012      LOS (days): 3  Geometric Mean LOS (GMLOS) (days): 4 30  Days to GMLOS:1 3     OBJECTIVE:  PATIENT READMITTED [facility] 35  of Unplanned Readmission Score: 51 86         Current admission status: Inpatient       Preferred Pharmacy:   [id number] - [address]  [address] [facility] [address]  Phone: [phone] Fax: [address] [facility], [address] [place] [address]  Phone: [phone] Fax: [phone]    Primary Care Provider: Catherine Muñoz MD    Primary Insurance: MEDICARE  Secondary Insurance: [address] [facility]    ASSESSMENT:  Johnny,  [address] - Significant Other   Primary Phone: [phone] (Mobile)  Home Phone: [phone]                         Readmission Root Cause  30 Day Readmission: Yes  Who directed you to return to the hospital?: Self  Did you understand whom to contact if you had questions or problems?: Refused to provide information  Did you get your prescriptions before you left the hospital?: Refused to provide information  Were you able to get your prescriptions filled when you left the hospital?: Refused to provide information  Did you take your medications as prescribed?: Refused to provide information  Were you able to get to your follow-up appointments?: Refused to provide information  Patient was readmitted due to: Pt stated she returned to the hospital due to severe pain  Patient Information  Admitted from[de-identified] Home  Mental Status: Alert  During Assessment patient was accompanied by: Not accompanied during assessment  Assessment information provided by[de-identified] Other - please comment (Chart review)  Primary Caregiver: Self  Support Systems: Spouse/significant other, Family members  [place] of Residence: [address] [place] [address] do you live in?: [facility] entry access options   Select all that apply : Stairs  Number of steps to enter home : One Flight  Type of Current Residence: 3 story home  Upon entering residence, is there a bedroom on the main floor (no further steps)?: No  A bedroom is located on the following floor levels of residence (select all that apply):: 2nd Floor  Number of steps to 2nd floor from main floor: One Flight  In the last 12 months, was there a time when you were not able to pay the mortgage or rent on time?: No  In the last 12 months, how many places have you lived?: 1  In the last 12 months, was there a time when you did not have a steady place to sleep or slept in a shelter (including now)?: No  Homeless/housing insecurity resource given?: N/A  Living Arrangements: Lives w/ Spouse/significant other    Activities of Daily Living Prior to Admission  Functional Status: Independent  Completes ADLs independently?: Yes  Ambulates independently?: Yes  Does patient use assisted devices?: Yes  Assisted Devices (DME) used: [employer]  Does patient currently own DME?: Yes  What DME does the patient currently own?: [employer]  Does patient have a history of Outpatient Therapy (PT/OT)?: No  Does the patient have a history of Short-Term Rehab?: No  Does patient have a history of HHC?: Yes [facility])  Does patient currently have [address]?: No         Patient Information Continued  Income Source: SSI/SSD  Within the past 12 months, you worried that your food would run out before you got the money to buy more : Never true  Within the past 12 months, the food you bought just didn't last and you didn't have money to get more : Never true  Does patient receive dialysis treatments?: Yes (HD at [address])  Does patient have a history of substance abuse?: No  Does patient have a history of Mental Health Diagnosis?: Yes  Has patient received inpatient treatment related to mental health in the last 2 years?: No         Means of Transportation  [facility] Family [facility]  In the past 12 months, has lack of transportation kept you from meetings, work, or from getting things needed for daily living?: No  Was application for public transport provided?: N/A        DISCHARGE DETAILS:    Discharge planning discussed with[de-identified] Pt  Freedom of Choice: Yes               Additional Comments: Pt was uncooperative with opening assessment, becoming irritable and paranoid when 30 day readmission questions were reviewed  Pt expressed her unhappiness over "being discharged" even though CM explained the standard procedure of opening assessments and there being no DC timeframe established at this time    Pt persisted expressing her confusion over why she was being asked questions, explaining that she is "not stupid", "has a college education", and doesn't "take medications not prescribed "

## 2022-08-02 NOTE — PLAN OF CARE
Problem: Potential for Falls  Goal: Patient will remain free of falls  Description: INTERVENTIONS:  - Educate patient/family on patient safety including physical limitations  - Instruct patient to call for assistance with activity   - Consult OT/PT to assist with strengthening/mobility   - Keep Call bell within reach  - Keep bed low and locked with side rails adjusted as appropriate  - Keep care items and personal belongings within reach  - Initiate and maintain comfort rounds  - Make Fall Risk Sign visible to staff  - Offer Toileting every 2 Hours, in advance of need  - Initiate/Maintain bed alarm  - Obtain necessary fall risk management equipment: yellow socks  - Apply yellow socks and bracelet for high fall risk patients  - Consider moving patient to room near nurses station  Outcome: Progressing     Problem: PAIN - ADULT  Goal: Verbalizes/displays adequate comfort level or baseline comfort level  Description: Interventions:  - Encourage patient to monitor pain and request assistance  - Assess pain using appropriate pain scale  - Administer analgesics based on type and severity of pain and evaluate response  - Implement non-pharmacological measures as appropriate and evaluate response  - Consider cultural and social influences on pain and pain management  - Notify physician/advanced practitioner if interventions unsuccessful or patient reports new pain  Outcome: Progressing     Problem: INFECTION - ADULT  Goal: Absence or prevention of progression during hospitalization  Description: INTERVENTIONS:  - Assess and monitor for signs and symptoms of infection  - Monitor lab/diagnostic results  - Monitor all insertion sites, i e  indwelling lines, tubes, and drains  - Monitor endotracheal if appropriate and nasal secretions for changes in amount and color  - [place] appropriate cooling/warming therapies per order  - Administer medications as ordered  - Instruct and encourage patient and family to use good hand hygiene technique  - Identify and instruct in appropriate isolation precautions for identified infection/condition  Outcome: Progressing  Goal: Absence of fever/infection during neutropenic period  Description: INTERVENTIONS:  - Monitor WBC    Outcome: Progressing     Problem: SAFETY ADULT  Goal: Patient will remain free of falls  Description: INTERVENTIONS:  - Educate patient/family on patient safety including physical limitations  - Instruct patient to call for assistance with activity   - Consult OT/PT to assist with strengthening/mobility   - Keep Call bell within reach  - Keep bed low and locked with side rails adjusted as appropriate  - Keep care items and personal belongings within reach  - Initiate and maintain comfort rounds  - Make Fall Risk Sign visible to staff  - Apply yellow socks and bracelet for high fall risk patients  - Consider moving patient to room near nurses station  Outcome: Progressing  Goal: Maintain or return to baseline ADL function  Description: INTERVENTIONS:  -  Assess patient's ability to carry out ADLs; assess patient's baseline for ADL function and identify physical deficits which impact ability to perform ADLs (bathing, care of mouth/teeth, toileting, grooming, dressing, etc )  - Assess/evaluate cause of self-care deficits   - Assess range of motion  - Assess patient's mobility; develop plan if impaired  - Assess patient's need for assistive devices and provide as appropriate  - Encourage maximum independence but intervene and supervise when necessary  - Involve family in performance of ADLs  - Assess for home care needs following discharge   - Consider OT consult to assist with ADL evaluation and planning for discharge  - Provide patient education as appropriate  Outcome: Progressing  Goal: Maintains/Returns to pre admission functional level  Description: INTERVENTIONS:  - Perform BMAT or MOVE assessment daily    - Set and communicate daily mobility goal to care team and patient/family/caregiver  - Collaborate with rehabilitation services on mobility goals if consulted  - Perform Range of Motion 3 times a day  - Reposition patient every 2 hours  - Dangle patient 3 times a day  - Stand patient 3 times a day  - Ambulate patient 3 times a day  - Out of bed to chair 3 times a day   - Out of bed for meals 3 times a day  - Out of bed for toileting  - Record patient progress and toleration of activity level   Outcome: Progressing     Problem: DISCHARGE PLANNING  Goal: Discharge to home or other facility with appropriate resources  Description: INTERVENTIONS:  - Identify barriers to discharge w/patient and caregiver  - Arrange for needed discharge resources and transportation as appropriate  - Identify discharge learning needs (meds, wound care, etc )  - Arrange for interpretive services to assist at discharge as needed  - Refer to Case Management Department for coordinating discharge planning if the patient needs post-hospital services based on physician/advanced practitioner order or complex needs related to functional status, cognitive ability, or social support system  Outcome: Progressing     Problem: Knowledge Deficit  Goal: Patient/family/caregiver demonstrates understanding of disease process, treatment plan, medications, and discharge instructions  Description: Complete learning assessment and assess knowledge base    Interventions:  - Provide teaching at level of understanding  - Provide teaching via preferred learning methods  Outcome: Progressing     Problem: MOBILITY - ADULT  Goal: Maintain or return to baseline ADL function  Description: INTERVENTIONS:  -  Assess patient's ability to carry out ADLs; assess patient's baseline for ADL function and identify physical deficits which impact ability to perform ADLs (bathing, care of mouth/teeth, toileting, grooming, dressing, etc )  - Assess/evaluate cause of self-care deficits   - Assess range of motion  - Assess patient's mobility; develop plan if impaired  - Assess patient's need for assistive devices and provide as appropriate  - Encourage maximum independence but intervene and supervise when necessary  - Involve family in performance of ADLs  - Assess for home care needs following discharge   - Consider OT consult to assist with ADL evaluation and planning for discharge  - Provide patient education as appropriate  Outcome: Progressing  Goal: Maintains/Returns to pre admission functional level  Description: INTERVENTIONS:  - Perform BMAT or MOVE assessment daily    - Set and communicate daily mobility goal to care team and patient/family/caregiver  - Collaborate with rehabilitation services on mobility goals if consulted    - Out of bed for toileting  - Record patient progress and toleration of activity level   Outcome: Progressing     Problem: Nutrition/Hydration-ADULT  Goal: Nutrient/Hydration intake appropriate for improving, restoring or maintaining nutritional needs  Description: Monitor and assess patient's nutrition/hydration status for malnutrition  Collaborate with interdisciplinary team and initiate plan and interventions as ordered  Monitor patient's weight and dietary intake as ordered or per policy  Utilize nutrition screening tool and intervene as necessary  Determine patient's food preferences and provide high-protein, high-caloric foods as appropriate       INTERVENTIONS:  - Monitor oral intake, urinary output, labs, and treatment plans  - Assess nutrition and hydration status and recommend course of action  - Evaluate amount of meals eaten  - Assist patient with eating if necessary   - Allow adequate time for meals  - Recommend/ encourage appropriate diets, oral nutritional supplements, and vitamin/mineral supplements  - Order, calculate, and assess calorie counts as needed  - Recommend, monitor, and adjust tube feedings and TPN/PPN based on assessed needs  - Assess need for intravenous fluids  - Provide specific nutrition/hydration education as appropriate  - Include patient/family/caregiver in decisions related to nutrition  Outcome: Progressing     Problem: METABOLIC, FLUID AND ELECTROLYTES - ADULT  Goal: Electrolytes maintained within normal limits  Description: INTERVENTIONS:  - Monitor labs and assess patient for signs and symptoms of electrolyte imbalances  - Administer electrolyte replacement as ordered  - Monitor response to electrolyte replacements, including repeat lab results as appropriate  - Instruct patient on fluid and nutrition as appropriate  Outcome: Progressing  Goal: Fluid balance maintained  Description: INTERVENTIONS:  - Monitor labs   - Monitor I/O and WT  - Instruct patient on fluid and nutrition as appropriate  - Assess for signs & symptoms of volume excess or deficit  Outcome: Progressing     Problem: Prexisting or High Potential for Compromised Skin Integrity  Goal: Skin integrity is maintained or improved  Description: INTERVENTIONS:  - Identify patients at risk for skin breakdown  - Assess and monitor skin integrity  - Assess and monitor nutrition and hydration status  - Monitor labs   - Assess for incontinence   - Turn and reposition patient  - Assist with mobility/ambulation  - Relieve pressure over bony prominences  - Avoid friction and shearing  - Provide appropriate hygiene as needed including keeping skin clean and dry  - Evaluate need for skin moisturizer/barrier cream  - Collaborate with interdisciplinary team   - Patient/family teaching  - Consider wound care consult   Outcome: Progressing

## 2022-08-02 NOTE — ASSESSMENT & PLAN NOTE
Patient complaining of severe right-sided low back pain with bilateral lower extremity radiculopathy right more than left x several months, resulting in multiple ED visits  · Prior MRI demonstrates moderately advanced degenerative lumbar spine disease at multi levels  Also L4-5 disc herniation with moderate central canal stenosis  Questionable L4-5 osteomyelitis/discitis on previous MRI  · Also history of right foot diabetic ulcer, osteomyelitis/MRSA infection with prior treatment with IV antibiotics December 2021, s/p right foot amputation  · Patient states back pain may have started around that time but recently has become significantly worse  · Patient underwent bilateral L3, L4 medial branch and L5 dorsal ramus block (for L4-L5, L5-S1 facet joints) at [facility] in May 2022 with no improvement in symptoms  Has not done PT  Has exhausted medication management  Imaging:  · CT lumbar spine w/o, 7/29/22: Vacuum disc phenomenon at L4-L5 with endplate erosive change at this level as well as involving the facet joints at L4-L5 and L5-S1  Findings are stable dating back to prior examinations since 6/28/2022 with overall mild interval progression since 11/10/2021  · MRI lumbar spine w/wo, 7/31/22: MR findings most suggestive for subacute L4-5 osteomyelitis with lesser degree discitis  Noncontrast images appear grossly similar to recent MRI  No paravertebral or epidural abscess  Mild to moderate degenerative canal stenosis at L4-5  Moderate right and mild left foraminal stenosis at this level  Stable central disc extrusion is again noted at L4-L5 demonstrating cranial migration and results in overall mild to moderate canal stenosis  · Lumbar flexion/extension 8/1/22: patient could not tolerate    Plan:  · Imaging reviewed with attending and discussed with the patient and her SO  There does appear to have been osteomyelitis at L4-5 but this does not appear active on contrasted images   This could also represent significant degnerative change with associated right L4-5 disc herniation  · Medical management and pain control per primary team  · CRP 20 8 down from 23 9  · ESR 24 down from 26  · BCs x2 from 7/29 pending, NGTD  · ID consulted, appreciate recommendations  · Continue to monitor off antibiotics  · Recommend percutaneous biopsy to r/o infection if warranted  · APS consulted for assistance with pain management  · Medications adjusted  · PT/OT evaluation, mobilize as tolerated  · LSO brace PRN comfort  · Routine neuro checks  · DVT PPX: SCDs and heparin  · Patient is on coumadin 9mg daily, INR 2 50 on 8/2  If surgery is considered this admission, INR will need to be less than 1 4    Neurosurgery will continue to follow  Options reviewed with the patient and her SO  Also reviewed case with Dr Philip Ornelas  Option 1 is continued conservative management with better pain control, outpatient physical therapy, and consideration of additional injections  Option 2 is IR biopsy to r/o infection; if negative proceed with likely lumbar fusion  The patient has a lot of medical co-moribities which will make surgery difficult and she will need clearances from medicine and nephrology  Patient would need to stop coumadin for goal INR < 1 4 for both biopsy and surgery  When I discussed her options with her, patient had anxiety attack and conversation was halted  Will readdress with her tomorrow

## 2022-08-02 NOTE — RESTORATIVE TECHNICIAN NOTE
Restorative Technician Note      Patient Name: Nikky Mills     Note Type: Bracing, Initial consult  Patient Position Upon Consult: Supine  Brace Applied: [place] Perris LSO Raheem Dsouza)  Additional Brace Ordered: Yes  Additional Brace Applied: Other (Comment) ([place] Extension Panel)  Patient Position When Brace Applied: Other (comment) (Seated EOB)  Bracing Recommendations: None  Education Provided: Yes  Patient Position at End of Consult: Seated edge of bed; All needs within reach  Nurse Communication: Nurse aware of consult, application of brace          Please call Mobility Coordinator at ext  [phone] or on Alexis text " [facility]-PT-Restorative Tech" role in regards to bracing instruction and/or adjustment    Louis Sexton Restorative Technician, BS

## 2022-08-02 NOTE — PHYSICAL THERAPY NOTE
Physical Therapy Cancellation Note    Patient's Name: Erna Aus       08/02/22 1659   PT Last Visit   PT Visit Date 08/02/22   Note Type   Note type Cancelled Session; Evaluation   Cancel Reasons Medical status   Additional Comments Pt continues pending neurosurgical intervention  Pt is ambulatory within her room  Please re-consult when POC established (surgical vs conservative management)  Thank you         Antonette Rojo, PT, DPT, GCS

## 2022-08-02 NOTE — PROGRESS NOTES
Progress Note - Infectious Disease   Nikky Mills 56 y o  female MRN: [id number]  Unit/Bed#: [facility] 814-01 Encounter: [id number]      Impression/Recommendations:  1  Possible L4-5 discitis/OM  In setting of #2  MRI with non-specific disc/endplate changes with minimal enhancement, stable over 3 weeks  ESR, CRP bland  Blood cultures negative  Lower suspicion for infection, but cannot exclude  Rec:  · Continue to follow closely off antibiotics  · Will discuss plan with Neurosurgery - biopsy for culture versus workup for fusion depending on their level of suspicion for infection     2  Acute on chronic low back pain  Known central stenosis with spondylosis, disc herniation on MRI   Recent worsening in setting of fall early July  Followed by pain management with prior LESIs, most recently in May  Also on pregabalin  Receiving narcotics in-house (60 mg oxycodone, 1 mg dilaudid over 24 hours)  Rec:  · APS follow-up ongoing     3  ESRD on HD  Via AVF      4   Poorly controlled DM with DPN        The above impression and plan was discussed in detail with the patient and RALPH Carrasco with SL[facility]      Antibiotics:  None    Subjective:  Patient seen on AM rounds  Denies fevers, chills, sweats, nausea, vomiting, or diarrhea  Still with severe back, mostly when moving around  24 Hour Events:  No documented fevers, chills, sweats, nausea, vomiting, or diarrhea      Objective:  Vitals:  Temp:  [98 1 °F (36 7 °C)-98 7 °F (37 1 °C)] 98 1 °F (36 7 °C)  HR:  [73-87] 80  Resp:  [16-20] 18  BP: ()/(44-81) 154/78  SpO2:  [92 %-96 %] 92 %  Temp (24hrs), Av 3 °F (36 8 °C), Min:98 1 °F (36 7 °C), Max:98 7 °F (37 1 °C)  Current: Temperature: 98 1 °F (36 7 °C)    Physical Exam:   General:  No acute distress  Psychiatric:  Awake and alert  Pulmonary:  Normal respiratory excursion without accessory muscle use  Abdomen:  Soft, nontender  Extremities:  No edema  Skin:  No rashes    Lab Results:  I have personally reviewed pertinent labs  Results from last 7 days   Lab Units 08/02/22  0845 08/01/22  0758 07/31/22  0546   POTASSIUM mmol/L 4 6 5 3 4 7   CHLORIDE mmol/L 99 101 101   CO2 mmol/L 30 31 34*   BUN mg/dL 24 39* 30*   CREATININE mg/dL 5 70* 7 92* 5 99*   EGFR ml/min/1 73sq m 7 5 7   CALCIUM mg/dL 9 0 8 5 8 5     Results from last 7 days   Lab Units 08/02/22  0845 08/01/22  0758 07/31/22  0546   WBC Thousand/uL 4 77 4 89 4 57   HEMOGLOBIN g/dL 7 7* 8 1* 7 3*   PLATELETS Thousands/uL 121* 140* 122*     Results from last 7 days   Lab Units 07/29/22  1218   BLOOD CULTURE  No Growth at 72 hrs  No Growth at 72 hrs  Imaging Studies:   I have personally reviewed pertinent imaging study reports and images in PACS  EKG, Pathology, and Other Studies:   I have personally reviewed pertinent reports

## 2022-08-02 NOTE — ASSESSMENT & PLAN NOTE
Lab Results   Component Value Date    HGBA1C 9 4 (H) 07/09/2022       Recent Labs     08/02/22  0633 08/02/22  0714 08/02/22  1326 08/02/22  1443   POCGLU 87 90 83 182*       Blood Sugar Average: Last 72 hrs:  (P) 135 125     SSI   Medical management per primary team

## 2022-08-02 NOTE — ASSESSMENT & PLAN NOTE
- poorly controlled with last hemoglobin A1c on 07/09/2022 at 9 4%    - Continue Lantus 12 units  - Continue SSI and glucose checks TID AC and QHS  - Hypoglycemia protocol

## 2022-08-02 NOTE — PROGRESS NOTES
[facility] [facility]  Progress Note Richard Conteh 1965, 56 y o  female MRN: [id number]  Unit/Bed#: [facility] 814-01 Encounter: [id number]  Primary Care Provider: Anamaria Castillo MD   Date and time admitted to hospital: 7/29/2022 11:10 AM    * Intractable back pain  Assessment & Plan  Patient complaining of severe right-sided low back pain with bilateral lower extremity radiculopathy right more than left x several months, resulting in multiple ED visits  · Prior MRI demonstrates moderately advanced degenerative lumbar spine disease at multi levels  Also L4-5 disc herniation with moderate central canal stenosis  Questionable L4-5 osteomyelitis/discitis on previous MRI  · Also history of right foot diabetic ulcer, osteomyelitis/MRSA infection with prior treatment with IV antibiotics December 2021, s/p right foot amputation  · Patient states back pain may have started around that time but recently has become significantly worse  · Patient underwent bilateral L3, L4 medial branch and L5 dorsal ramus block (for L4-L5, L5-S1 facet joints) at [facility] in May 2022 with no improvement in symptoms  Has not done PT  Has exhausted medication management  Imaging:  · CT lumbar spine w/o, 7/29/22: Vacuum disc phenomenon at L4-L5 with endplate erosive change at this level as well as involving the facet joints at L4-L5 and L5-S1  Findings are stable dating back to prior examinations since 6/28/2022 with overall mild interval progression since 11/10/2021  · MRI lumbar spine w/wo, 7/31/22: MR findings most suggestive for subacute L4-5 osteomyelitis with lesser degree discitis  Noncontrast images appear grossly similar to recent MRI  No paravertebral or epidural abscess  Mild to moderate degenerative canal stenosis at L4-5  Moderate right and mild left foraminal stenosis at this level   Stable central disc extrusion is again noted at L4-L5 demonstrating cranial migration and results in overall mild to moderate canal stenosis  · Lumbar flexion/extension 8/1/22: patient could not tolerate    Plan:  · Imaging reviewed with attending and discussed with the patient and her SO  There does appear to have been osteomyelitis at L4-5 but this does not appear active on contrasted images  This could also represent significant degnerative change with associated right L4-5 disc herniation  · Medical management and pain control per primary team  · CRP 20 8 down from 23 9  · ESR 24 down from 26  · BCs x2 from 7/29 pending, NGTD  · ID consulted, appreciate recommendations  · Continue to monitor off antibiotics  · Recommend percutaneous biopsy to r/o infection if warranted  · APS consulted for assistance with pain management  · Medications adjusted  · PT/OT evaluation, mobilize as tolerated  · LSO brace PRN comfort  · Routine neuro checks  · DVT PPX: SCDs and heparin  · Patient is on coumadin 9mg daily, INR 2 50 on 8/2  If surgery is considered this admission, INR will need to be less than 1 4    Neurosurgery will continue to follow  Options reviewed with the patient and her SO  Also reviewed case with Dr Dana Be  Option 1 is continued conservative management with better pain control, outpatient physical therapy, and consideration of additional injections  Option 2 is IR biopsy to r/o infection; if negative proceed with likely lumbar fusion  The patient has a lot of medical co-moribities which will make surgery difficult and she will need clearances from medicine and nephrology  Patient would need to stop coumadin for goal INR < 1 4 for both biopsy and surgery  When I discussed her options with her, patient had anxiety attack and conversation was halted  Will readdress with her tomorrow         Type 2 diabetes mellitus [facility])  Assessment & Plan  Lab Results   Component Value Date    HGBA1C 9 4 (H) 07/09/2022       Recent Labs     08/02/22  0633 08/02/22  0714 08/02/22  1326 08/02/22  1443   POCGLU 87 90 83 182*       Blood Sugar Average: Last 72 hrs:  (P) 135 125     SSI   Medical management per primary team    ESRD on hemodialysis  Assessment & Plan  Lab Results   Component Value Date    EGFR 7 08/02/2022    EGFR 5 08/01/2022    EGFR 7 07/31/2022    CREATININE 5 70 (H) 08/02/2022    CREATININE 7 92 (H) 08/01/2022    CREATININE 5 99 (H) 07/31/2022     Nephrology following closely  Scheduled for dialysis T/T/Sa outpatient    History of venous thromboembolism  Assessment & Plan  On coumadin 9mg daily  INR 2 50 8/2    Will need INR < 1 4 for surgery if ultimately recommended              Subjective/Objective   Chief Complaint: "My back hurts so bad"    Subjective: Patient with NAEO  She continues with significant back pain and RLE pain  She just returned from dialysis and is complaining of dizziness which ultimately progressed to an anxiety attack in the room  She denied xanax as she just had percocet and dilaudid  NC was placed on patient and the rest of the conversation was put on hold  Objective: Patient anxious, pacing, changing positions on the bed frequently  Intake/Output                 08/02/22 0701 - 08/03/22 0700     [id number] 0484-1132 Total              Intake    I V   500  -- 500    Other  300  -- 300    Total Intake 800 -- 800       Output    Other  3006  -- 3006    Dialysis Fluid Removed 3006 -- 3006    Total Output 3006 -- 3006       Net I/O     -2206 -- -2206          Invasive Devices  Report    Peripheral Intravenous Line  Duration           Peripheral IV 08/02/22 Right Antecubital <1 day          Line  Duration           Hemodialysis AV Fistula 02/20/18 Left Forearm 1623 days                  Vitals: Blood pressure 167/68, pulse 80, temperature 97 7 °F (36 5 °C), temperature source Oral, resp  rate 18, height 5' 6" (1 676 m), weight 130 kg (286 lb 9 6 oz), last menstrual period 02/12/2016, SpO2 92 %, not currently breastfeeding  ,Body mass index is 46 26 kg/m²      General appearance: alert, appears stated age, anxious  Head: Normocephalic, without obvious abnormality, atraumatic  Eyes: EOMI, PERRL  Back: exam deferred  Lungs: non labored breathing  Heart: regular heart rate  Neurologic:   Exam deferred 2/2 acute anxiety attack    Lab Results: I have personally reviewed pertinent results  Imaging Studies: I have personally reviewed pertinent reports  and I have personally reviewed pertinent films in PACS    EKG, Pathology, and Other Studies: I have personally reviewed pertinent reports        VTE Mechanical Prophylaxis: sequential compression device

## 2022-08-02 NOTE — ASSESSMENT & PLAN NOTE
- acute on chronic intractable low back pain with bilateral lower extremity radiculopathy  - MRI with findings concerning for diskitis/osteomyelitis  - seen by infectious disease:  Infection thought to be less likely though cannot be fully excluded  - seen by Neurosurgery:  Provided two options given her severe disease and symptomatology   1) surgery 2) intensive conservative management with physical therapy and pain management as an outpatient    - continue to monitor off antibiotics  - patient currently weighing her options provided by neuro surgical team   Trying to decide between proceeding with bone biopsy and surgical intervention vs conservative therapy with pain management and intensive PT as an outpatient   - fall precautions  - LSO brace p r n  for comfort  - analgesia per APS team

## 2022-08-02 NOTE — PLAN OF CARE
Post-Dialysis RN Treatment Note    Blood Pressure:  Pre 167/59 mm/Hg  Post 167/68 mmHg   EDW  124 kg    Weight:  Pre 123 5 kg   Post 120 8 kg   Mode of weight measurement: Bed Scale   Volume Removed  2506 ml    Treatment duration 210 minutes    NS given  Yes, 300 total for hypotension    Treatment shortened? No   Medications given during Rx Percocet 5/325 2 tablets   Estimated Kt/V  1 21   Access type: AV fistula   Access Issues: Yes, describe: difficulty controlling arterial site bleeding    Report called to primary nurse   Yes Yaneli Samuel RN    Current hemodialysis plan of care is to remove a total of 3500 ml of fluid over a 3 1/2 hour treatment for a net of 3 liters as tolerated  Monitor vital signs every 15 minutes while on treatment for patient safety  Utilize a 2 K+ bath for serum potassium of 5 3 to maintain electrolyte balance  Report received from Yaneli Samuel RN  Plan reviewed with Dr Jeremy Maurer at bedside        Problem: METABOLIC, FLUID AND ELECTROLYTES - ADULT  Goal: Electrolytes maintained within normal limits  Description: INTERVENTIONS:  - Monitor labs and assess patient for signs and symptoms of electrolyte imbalances  - Administer electrolyte replacement as ordered  - Monitor response to electrolyte replacements, including repeat lab results as appropriate  - Instruct patient on fluid and nutrition as appropriate  Outcome: Progressing  Goal: Fluid balance maintained  Description: INTERVENTIONS:  - Monitor labs   - Monitor I/O and WT  - Instruct patient on fluid and nutrition as appropriate  - Assess for signs & symptoms of volume excess or deficit  Outcome: Progressing

## 2022-08-03 LAB
BACTERIA BLD CULT: NORMAL
BACTERIA BLD CULT: NORMAL
GLUCOSE SERPL-MCNC: 149 MG/DL (ref 65–140)
GLUCOSE SERPL-MCNC: 168 MG/DL (ref 65–140)
GLUCOSE SERPL-MCNC: 184 MG/DL (ref 65–140)
GLUCOSE SERPL-MCNC: 217 MG/DL (ref 65–140)
GLUCOSE SERPL-MCNC: 63 MG/DL (ref 65–140)
GLUCOSE SERPL-MCNC: 63 MG/DL (ref 65–140)
INR PPP: 2.72 (ref 0.84–1.19)
PROTHROMBIN TIME: 29.1 SECONDS (ref 11.6–14.5)

## 2022-08-03 PROCEDURE — 85610 PROTHROMBIN TIME: CPT | Performed by: INTERNAL MEDICINE

## 2022-08-03 PROCEDURE — 99232 SBSQ HOSP IP/OBS MODERATE 35: CPT | Performed by: INTERNAL MEDICINE

## 2022-08-03 PROCEDURE — 99232 SBSQ HOSP IP/OBS MODERATE 35: CPT | Performed by: PHYSICIAN ASSISTANT

## 2022-08-03 PROCEDURE — 82948 REAGENT STRIP/BLOOD GLUCOSE: CPT

## 2022-08-03 PROCEDURE — 99232 SBSQ HOSP IP/OBS MODERATE 35: CPT | Performed by: NURSE PRACTITIONER

## 2022-08-03 RX ORDER — INSULIN GLARGINE 100 [IU]/ML
9 INJECTION, SOLUTION SUBCUTANEOUS
Status: DISCONTINUED | OUTPATIENT
Start: 2022-08-03 | End: 2022-08-04 | Stop reason: HOSPADM

## 2022-08-03 RX ADMIN — Medication 16 G: at 08:26

## 2022-08-03 RX ADMIN — Medication 3 MG: at 21:48

## 2022-08-03 RX ADMIN — CINACALCET 30 MG: 30 TABLET, FILM COATED ORAL at 08:22

## 2022-08-03 RX ADMIN — LIDOCAINE 5% 1 PATCH: 700 PATCH TOPICAL at 08:22

## 2022-08-03 RX ADMIN — NYSTATIN: 100000 POWDER TOPICAL at 08:22

## 2022-08-03 RX ADMIN — SEVELAMER HYDROCHLORIDE 1600 MG: 800 TABLET ORAL at 11:44

## 2022-08-03 RX ADMIN — WARFARIN SODIUM 9 MG: 3 TABLET ORAL at 17:27

## 2022-08-03 RX ADMIN — OXYCODONE HYDROCHLORIDE AND ACETAMINOPHEN 2 TABLET: 5; 325 TABLET ORAL at 19:12

## 2022-08-03 RX ADMIN — OXYCODONE HYDROCHLORIDE AND ACETAMINOPHEN 2 TABLET: 5; 325 TABLET ORAL at 11:46

## 2022-08-03 RX ADMIN — PANTOPRAZOLE SODIUM 40 MG: 40 TABLET, DELAYED RELEASE ORAL at 06:13

## 2022-08-03 RX ADMIN — CARVEDILOL 25 MG: 25 TABLET, FILM COATED ORAL at 08:22

## 2022-08-03 RX ADMIN — INSULIN LISPRO 1 UNITS: 100 INJECTION, SOLUTION INTRAVENOUS; SUBCUTANEOUS at 21:49

## 2022-08-03 RX ADMIN — PREGABALIN 50 MG: 50 CAPSULE ORAL at 08:22

## 2022-08-03 RX ADMIN — SENNOSIDES 17.2 MG: 8.6 TABLET, FILM COATED ORAL at 21:48

## 2022-08-03 RX ADMIN — DICYCLOMINE HYDROCHLORIDE 10 MG: 10 CAPSULE ORAL at 11:44

## 2022-08-03 RX ADMIN — ALPRAZOLAM 0.25 MG: 0.25 TABLET ORAL at 10:23

## 2022-08-03 RX ADMIN — INSULIN GLARGINE 9 UNITS: 100 INJECTION, SOLUTION SUBCUTANEOUS at 21:48

## 2022-08-03 RX ADMIN — DICYCLOMINE HYDROCHLORIDE 10 MG: 10 CAPSULE ORAL at 21:48

## 2022-08-03 RX ADMIN — DICYCLOMINE HYDROCHLORIDE 10 MG: 10 CAPSULE ORAL at 06:13

## 2022-08-03 RX ADMIN — LORATADINE 10 MG: 10 TABLET ORAL at 08:22

## 2022-08-03 RX ADMIN — LEVOTHYROXINE SODIUM 50 MCG: 50 TABLET ORAL at 06:13

## 2022-08-03 RX ADMIN — POLYETHYLENE GLYCOL 3350 17 G: 17 POWDER, FOR SOLUTION ORAL at 08:26

## 2022-08-03 RX ADMIN — TORSEMIDE 100 MG: 20 TABLET ORAL at 08:22

## 2022-08-03 RX ADMIN — INSULIN LISPRO 1 UNITS: 100 INJECTION, SOLUTION INTRAVENOUS; SUBCUTANEOUS at 11:46

## 2022-08-03 RX ADMIN — SERTRALINE HYDROCHLORIDE 75 MG: 50 TABLET ORAL at 08:22

## 2022-08-03 RX ADMIN — SEVELAMER HYDROCHLORIDE 1600 MG: 800 TABLET ORAL at 08:21

## 2022-08-03 NOTE — PROGRESS NOTES
Progress Note - Acute Pain Service    Ary Miller 56 y o  female MRN: [id number]  Unit/Bed#: [facility] 071-74 Encounter: [id number]      Assessment:   Principal Problem:    Intractable back pain  Active Problems:    Essential hypertension    History of venous thromboembolism    ESRD on hemodialysis    Morbid obesity    Type 2 diabetes mellitus ([address] )    Ary Miller is a 56 y o  female who presents to the hospital on 7/29/22 for intractable back pain  The patient has chronic Low back pain due to degenerative lumbar spine disease  Her PMH includes HTN, DM, ESRD on HD & DVT  Since her admission the patient is being followed by Neurosurgery and had multiple imaging of her lower back which revealed erosive changes in her lumbar spine and significant degnerative change with associated right L4-5 disc herniation, the MRI imaging may also represent subacute osteomyelitis of L4-5  ID is following, recommending biopsy to r/o infection  NS provided patient with a brace and recommend biopsy as well with non urgent surgical intervention  The patient is very anxious about her condition  Overall, she feels her anxiety is worse than her pain  This morning she was sitting up in bed comfortably and rates her pain a 5/10 at rest but an 8/10 when she moves  She feels that the PO percocet works well for her and bring her pain down to a comfortable level  She is afraid of having surgery as she recently had her toes amputated  Plan:   - Percocet 5-325 mg 1 tablet/Percocet 5-325 mg 2 tablets PO q6hrs PRN for moderate/severe pain  Dilaudid 0 2 mg IV q4hr PRN for breakthrough pain    Multimodal analgesia:  - would schedule Tylenol 975 mg PO q8hrs standing  - Lyrica 50 mg PO Daily (home med)  - Lidocaine patches q12 hrs on/off   - Could consider adding 500 mg robaxin q 6 hrs  - Continue xanax 0 25 mg BID PRN (home med) for anxiety     Bowel Regimen:  - Last BM was on Monday   - Continue miralax     APS will sign off at this time   Thank you for the consult  All opioids and other analgesics to be written at discretion of primary team  Please contact Acute Pain Service - [facility] via [employer] from [phone] with additional questions or concerns  See Aishwarya or Ricki for additional contacts and after hours information  Pain History  Current pain location(s): lower back  Pain Scale:   5-8  Quality: sharp  24 hour history: patient seen by APS switched to percocet    Opioid requirement previous 24 hours:   0 5 mg IV dilaudid   4 percocet tablets     Meds/Allergies   all current active meds have been reviewed    Allergies   Allergen Reactions    Iodinated Diagnostic Agents Itching    Keflex [Cephalexin] Hives    Eggs Or Egg-Derived Products - Food Allergy Rash    Wound Dressing Adhesive Rash       Objective     Temp:  [97 7 °F (36 5 °C)-98 7 °F (37 1 °C)] 98 7 °F (37 1 °C)  HR:  [80-85] 82  Resp:  [18-20] 20  BP: ()/(48-78) 117/72    Physical Exam  Constitutional:       Appearance: Normal appearance  HENT:      Head: Normocephalic and atraumatic  Right Ear: External ear normal       Left Ear: External ear normal       Nose: Nose normal       Mouth/Throat:      Mouth: Mucous membranes are moist       Pharynx: Oropharynx is clear  Eyes:      Extraocular Movements: Extraocular movements intact  Pupils: Pupils are equal, round, and reactive to light  Cardiovascular:      Rate and Rhythm: Normal rate and regular rhythm  Pulses: Normal pulses  Heart sounds: Normal heart sounds  Pulmonary:      Effort: Pulmonary effort is normal       Breath sounds: Normal breath sounds  Abdominal:      General: Bowel sounds are normal       Palpations: Abdomen is soft  Musculoskeletal:         General: Normal range of motion  Cervical back: Normal range of motion  Skin:     General: Skin is warm and dry  Capillary Refill: Capillary refill takes less than 2 seconds        Comments: Right foot toe amputation    Neurological: General: No focal deficit present  Mental Status: She is alert and oriented to person, place, and time  Psychiatric:         Mood and Affect: Mood is anxious  Cognition and Memory: Cognition normal          Lab Results:   Results from last 7 days   Lab Units 08/02/22  0845   WBC Thousand/uL 4 77   HEMOGLOBIN g/dL 7 7*   HEMATOCRIT % 24 2*   PLATELETS Thousands/uL 121*      Results from last 7 days   Lab Units 08/02/22  0845   POTASSIUM mmol/L 4 6   CHLORIDE mmol/L 99   CO2 mmol/L 30   BUN mg/dL 24   CREATININE mg/dL 5 70*   CALCIUM mg/dL 9 0       Imaging Studies: I have personally reviewed pertinent reports  EKG, Pathology, and Other Studies: I have personally reviewed pertinent reports  Counseling / Coordination of Care  Total floor / unit time spent today 25 minutes  Greater than 50% of total time was spent with the patient and / or family counseling and / or coordination of care  A description of the counseling / coordination of care: acute on chronic back pain     Please note that the APS provides consultative services regarding pain management only  With the exception of ketamine and epidural infusions and except when indicated, final decisions regarding starting or changing doses of analgesic medications are at the discretion of the consulting service  Off hours consultation and/or medication management is generally not available      LOIS Sarmiento  Acute Pain Service

## 2022-08-03 NOTE — PROGRESS NOTES
[facility]  Progress Note Emir Solano 1965, 56 y o  female MRN: [id number]  Unit/Bed#: [facility] 814-01 Encounter: [id number]  Primary Care Provider: Jose Alfredo Oquendo MD   Date and time admitted to hospital: 7/29/2022 11:10 AM    * Intractable back pain  Assessment & Plan  Patient complaining of severe right-sided low back pain with bilateral lower extremity radiculopathy right more than left x several months, resulting in multiple ED visits  · Prior MRI demonstrates moderately advanced degenerative lumbar spine disease at multi levels  Also L4-5 disc herniation with moderate central canal stenosis  Questionable L4-5 osteomyelitis/discitis on previous MRI  · Also history of right foot diabetic ulcer, osteomyelitis/MRSA infection with prior treatment with IV antibiotics December 2021, s/p right foot amputation  · Patient states back pain may have started around that time but recently has become significantly worse  · Patient underwent bilateral L3, L4 medial branch and L5 dorsal ramus block (for L4-L5, L5-S1 facet joints) at [facility] in May 2022 with no improvement in symptoms  Has not done PT  Has exhausted medication management  Imaging:  · CT lumbar spine w/o, 7/29/22: Vacuum disc phenomenon at L4-L5 with endplate erosive change at this level as well as involving the facet joints at L4-L5 and L5-S1  Findings are stable dating back to prior examinations since 6/28/2022 with overall mild interval progression since 11/10/2021  · MRI lumbar spine w/wo, 7/31/22: MR findings most suggestive for subacute L4-5 osteomyelitis with lesser degree discitis  Noncontrast images appear grossly similar to recent MRI  No paravertebral or epidural abscess  Mild to moderate degenerative canal stenosis at L4-5  Moderate right and mild left foraminal stenosis at this level   Stable central disc extrusion is again noted at L4-L5 demonstrating cranial migration and results in overall mild to moderate canal stenosis  · Lumbar flexion/extension 8/1/22: patient could not tolerate    Plan:  · Medical management and pain control per primary team  · CRP 20 8 down from 23 9  · ESR 24 down from 26  · BCs x2 from 7/29 pending, NGTD  · ID consulted, appreciate recommendations  · Continue to monitor off antibiotics  · Recommend percutaneous biopsy to r/o infection if warranted  · APS consulted for assistance with pain management  · Medications adjusted  · PT/OT evaluation, mobilize as tolerated  · LSO brace PRN comfort  · Routine neuro checks  · DVT PPX: SCDs and heparin  · Patient is on coumadin 9mg daily, INR 2 50 on 8/2  If surgery is considered this admission, INR will need to be less than 1 4  · On imaging there does appear to have been osteomyelitis at L4-5 but this does not appear active on contrasted images  Pt may have had the OM treated with IV abx previously when she had right foot infection  This could also represent significant degnerative change with associated right L4-5 disc herniation  · The options below have been discussed with pt and SO    · Option 1 is continued conservative management with better pain control, outpatient physical therapy, and consideration of additional injections  · Option 2  was consideration for IR biopsy but per discussion w/Dr Arturo Bautista, if pt had OM it may have been previously treated given the decreasing inflammatory markers  IR biopsy not be recommended at this time  · Option 3 would be repeat imaging/labs at a later date in 6-8 weeks to ensure pt has no active infection or progression at L4-5 and consideration for surgical intervention for lumbar fusion at L4-5 if pt is medically appropriate  Patient has a lot of medical co-moribities which will make the decision for surgery difficult to balance the potential risks and complications with the potential benefits  Neurosurgery will continue to follow and further discuss with pt   Please call with any questions or concerns  History of venous thromboembolism  Assessment & Plan  On coumadin 9mg daily  INR 2 50 8/2    Will need INR < 1 4 for surgery if ultimately recommended    ESRD on hemodialysis  Assessment & Plan  Lab Results   Component Value Date    EGFR 7 08/02/2022    EGFR 5 08/01/2022    EGFR 7 07/31/2022    CREATININE 5 70 (H) 08/02/2022    CREATININE 7 92 (H) 08/01/2022    CREATININE 5 99 (H) 07/31/2022     Nephrology following closely  Scheduled for dialysis T/T/Sa outpatient    Type 2 diabetes mellitus [facility])  Assessment & Plan  Lab Results   Component Value Date    HGBA1C 9 4 (H) 07/09/2022       Recent Labs     08/03/22  0813 08/03/22  1000 08/03/22  1110 08/03/22  1621   POCGLU 63* 184* 168* 149*       Blood Sugar Average: Last 72 hrs:  (P) 122 55     SSI   Medical management per primary team      Subjective/Objective     Chief Complaint: "Ouch"/ Follow up for persistent low back pain  Subjective: Pt was drowsy during this visit and did not provide complaints  She said "ouch" when she flexed her right hip  She could not tell whether the pain was from her back or RLE  Objective: Drowsy, No acute distress  I/O       08/01 0701 08/02 0700 08/02 0701 08/03 0700 08/03 0701  08/04 0700    P  O    240    I V  (mL/kg) 500 (3 8) 500 (3 8)     Other 100 300     Total Intake(mL/kg) 600 (4 6) 800 (6 2) 240 (1 8)    Emesis/NG output 0      Other 2450 3006     Total Output 2450 3006     Net -1850 -2206 +240           Unmeasured Emesis Occurrence 1 x            Invasive Devices  Report    Peripheral Intravenous Line  Duration           Peripheral IV 08/03/22 Right Antecubital <1 day          Line  Duration           Hemodialysis AV Fistula 02/20/18 Left Forearm 1624 days                Physical Exam:  Vitals: Blood pressure 96/69, pulse 82, temperature 98 °F (36 7 °C), resp   rate 20, height 5' 6" (1 676 m), weight 130 kg (286 lb 9 6 oz), last menstrual period 02/12/2016, SpO2 93 %, not currently breastfeeding  ,Body mass index is 46 26 kg/m²  General appearance:  Appears stated age  Head: Normocephalic, without obvious abnormality, atraumatic  Eyes: EOMI, PERRL  Neck: supple, symmetrical, trachea midline   Lungs: non labored breathing  Heart: regular heart rate  Neurologic:   Mental status: Drowsy, NAD  Cranial nerves: grossly intact (Cranial nerves II-XII)  Sensory: Limited as pt was drowsy  Motor: Right foot amputation  3-4/5  Right HF weakness, otherwise 5/5 strength in other extremities  Coordination:  no drift in bilateral upper extremities      Lab Results:  Results from last 7 days   Lab Units 08/02/22  0845 08/01/22  0758 07/31/22  0546 07/30/22  0901 07/29/22  1218   WBC Thousand/uL 4 77 4 89 4 57   < > 4 67   HEMOGLOBIN g/dL 7 7* 8 1* 7 3*   < > 7 5*   HEMATOCRIT % 24 2* 25 4* 23 5*   < > 23 8*   PLATELETS Thousands/uL 121* 140* 122*   < > 114*   NEUTROS PCT %  --   --   --   --  68   MONOS PCT %  --   --   --   --  12    < > = values in this interval not displayed  Results from last 7 days   Lab Units 08/02/22  0845 08/01/22  0758 07/31/22  0546   POTASSIUM mmol/L 4 6 5 3 4 7   CHLORIDE mmol/L 99 101 101   CO2 mmol/L 30 31 34*   BUN mg/dL 24 39* 30*   CREATININE mg/dL 5 70* 7 92* 5 99*   CALCIUM mg/dL 9 0 8 5 8 5         Results from last 7 days   Lab Units 08/02/22  0845   PHOSPHORUS mg/dL 5 3*     Results from last 7 days   Lab Units 08/03/22  0638 08/02/22  0845 08/01/22  0758   INR  2 72* 2 50* 2 44*     Imaging Studies: I have personally reviewed pertinent reports and I have personally reviewed pertinent films in PACS    EKG, Pathology, and Other Studies: I have personally reviewed pertinent reports  VTE Mechanical Prophylaxis: sequential compression device    PLEASE NOTE:  This encounter may have been completed utilizing the Direct Access Software/[employer] Direct Speech Voice Recognition Software   Grammatical errors, random word insertions, pronoun errors and incomplete sentences are occasional consequences of the system due to software limitations, ambient noise and hardware issues  These may be missed by proof reading prior to affixing electronic signature  Any questions or concerns about the content, text or information contained within the body of this dictation should be directly addressed to the advanced practitioner or physician for clarification

## 2022-08-03 NOTE — ASSESSMENT & PLAN NOTE
- last hemoglobin A1c on 07/09/2022 at 9 4%  - patient to resume home regimen on discharge  - blood glucose has been variable while inpatient despite being on home regimen including some episodes of hypoglycemia  - patient instructed to follow-up closely with her PCP for further management

## 2022-08-03 NOTE — ASSESSMENT & PLAN NOTE
Patient complaining of severe right-sided low back pain with bilateral lower extremity radiculopathy right more than left x several months, resulting in multiple ED visits  · Prior MRI demonstrates moderately advanced degenerative lumbar spine disease at multi levels  Also L4-5 disc herniation with moderate central canal stenosis  Questionable L4-5 osteomyelitis/discitis on previous MRI  · Also history of right foot diabetic ulcer, osteomyelitis/MRSA infection with prior treatment with IV antibiotics December 2021, s/p right foot amputation  · Patient states back pain may have started around that time but recently has become significantly worse  · Patient underwent bilateral L3, L4 medial branch and L5 dorsal ramus block (for L4-L5, L5-S1 facet joints) at [facility] in May 2022 with no improvement in symptoms  Has not done PT  Has exhausted medication management  Imaging:  · CT lumbar spine w/o, 7/29/22: Vacuum disc phenomenon at L4-L5 with endplate erosive change at this level as well as involving the facet joints at L4-L5 and L5-S1  Findings are stable dating back to prior examinations since 6/28/2022 with overall mild interval progression since 11/10/2021  · MRI lumbar spine w/wo, 7/31/22: MR findings most suggestive for subacute L4-5 osteomyelitis with lesser degree discitis  Noncontrast images appear grossly similar to recent MRI  No paravertebral or epidural abscess  Mild to moderate degenerative canal stenosis at L4-5  Moderate right and mild left foraminal stenosis at this level  Stable central disc extrusion is again noted at L4-L5 demonstrating cranial migration and results in overall mild to moderate canal stenosis    · Lumbar flexion/extension 8/1/22: patient could not tolerate    Plan:  · Medical management and pain control per primary team  · CRP 20 8 down from 23 9  · ESR 24 down from 26  · BCs x2 from 7/29 pending, NGTD  · ID consulted, appreciate recommendations  · Continue to monitor off antibiotics  · Recommend percutaneous biopsy to r/o infection if warranted  · APS consulted for assistance with pain management  · Medications adjusted  · PT/OT evaluation, mobilize as tolerated  · LSO brace PRN comfort  · Routine neuro checks  · DVT PPX: SCDs and heparin  · Patient is on coumadin 9mg daily, INR 2 50 on 8/2  If surgery is considered this admission, INR will need to be less than 1 4  · On imaging there does appear to have been osteomyelitis at L4-5 but this does not appear active on contrasted images  Pt may have had the OM treated with IV abx previously when she had right foot infection  This could also represent significant degnerative change with associated right L4-5 disc herniation  · The options below have been discussed with pt and SO    · Option 1 is continued conservative management with better pain control, outpatient physical therapy, and consideration of additional injections  · Option 2  was consideration for IR biopsy but per discussion w/Dr Facundo Hobbs, if pt had OM it may have been previously treated given the decreasing inflammatory markers  IR biopsy not be recommended at this time  · Option 3 would be repeat imaging/labs at a later date in 6-8 weeks to ensure pt has no active infection or progression at L4-5 and consideration for surgical intervention for lumbar fusion at L4-5 if pt is medically appropriate  Patient has a lot of medical co-moribities which will make the decision for surgery difficult to balance the potential risks and complications with the potential benefits  Neurosurgery will continue to follow and further discuss with pt  Please call with any questions or concerns

## 2022-08-03 NOTE — PROGRESS NOTES
[address] [facility]  Progress Note Marce Strong 1965, 56 y o  female MRN: [id number]  Unit/Bed#: [facility] 227-83 Encounter: [id number]  Primary Care Provider: Michelle Michael MD   Date and time admitted to hospital: 7/29/2022 11:10 AM    * Intractable back pain  Assessment & Plan  - acute on chronic intractable low back pain with bilateral lower extremity radiculopathy  - MRI with findings concerning for diskitis/osteomyelitis  - seen by infectious disease:  Infection thought to be less likely though cannot be fully excluded  - seen by Neurosurgery:  Provided two options given her severe disease and symptomatology  1) surgery 2) intensive conservative management with physical therapy and pain management as an outpatient    - may possibly be pursuing bone biopsy to rule out osteomyelitis with surgical intervention to follow if biopsy negative for infection  - continue to monitor off antibiotics  - fall precautions  - LSO brace p r n  for comfort  - analgesia per APS team    Type 2 diabetes mellitus ([address] )  Assessment & Plan  - poorly controlled with last hemoglobin A1c on 07/09/2022 at 9 4%    - reduce Lantus to 9 units q h s  due to a m  hypoglycemia  - Continue SSI and glucose checks TID AC and QHS  - Hypoglycemia protocol     Morbid obesity  Assessment & Plan  - BMI 46  - affects all levels of care; diet and lifestyle modifications recommended    ESRD on hemodialysis  Assessment & Plan  - nephrology consulted for HD    History of venous thromboembolism  Assessment & Plan  - continue Coumadin with a goal of INR 2 0-3 0  - daily INR    Essential hypertension  Assessment & Plan  - monitor on home carvedilol    VTE Pharmacologic Prophylaxis:   Moderate Risk (Score 3-4) - Pharmacological DVT Prophylaxis Ordered: warfarin (Coumadin)  Patient Centered Rounds: I performed bedside rounds with nursing staff today    Discussions with Specialists or Other Care Team Provider: PRISCILA  Neurosurgery  Education and Discussions with Family / Patient: Updated  (significant other) at bedside  Time Spent for Care: 30 minutes  More than 50% of total time spent on counseling and coordination of care as described above  Current Length of Stay: 4 day(s)  Current Patient Status: Inpatient   Certification Statement: The patient will continue to require additional inpatient hospital stay due to Potential surgical intervention and pain management  Discharge Plan: To be determined pending clinical course    Code Status: Level 1 - Full Code    Subjective:   Patient seen examined  Following up for intractable back pain  Pain about the same today  No significant changes  Patient is still quite anxious and upset due to are ongoing issues with pain  Unsure if she wants to proceed with surgery  Objective:     Vitals:   Temp (24hrs), Av 3 °F (36 8 °C), Min:98 1 °F (36 7 °C), Max:98 7 °F (37 1 °C)    Temp:  [98 1 °F (36 7 °C)-98 7 °F (37 1 °C)] 98 7 °F (37 1 °C)  HR:  [80-82] 82  Resp:  [20] 20  BP: ()/(48-72) 117/72  SpO2:  [93 %] 93 %  Body mass index is 46 26 kg/m²  Input and Output Summary (last 24 hours): Intake/Output Summary (Last 24 hours) at 8/3/2022 1507  Last data filed at 8/3/2022 0900  Gross per 24 hour   Intake 240 ml   Output --   Net 240 ml       PHYSICAL EXAM:    Vitals signs reviewed  Constitutional   Awake and cooperative  Uncomfortable  Head/Neck   Normocephalic  Atraumatic  HEENT   No scleral icterus  EOMI  Heart   Regular rate and rhythm  No murmers  Lungs   Clear to auscultation bilaterally  Respirations unlaboured  Abdomen   Soft  Nontender  Nondistended  Obese  Skin   Skin color normal  No rashes  Extremities   No deformities  No peripheral edema  Neuro   Alert and oriented  No new deficits  Psych   Mood stable  Affect anxious           Additional Data:     Labs:  Results from last 7 days   Lab Units 22  0845 07/30/22  0901 07/29/22  1218   WBC Thousand/uL 4 77   < > 4 67   HEMOGLOBIN g/dL 7 7*   < > 7 5*   HEMATOCRIT % 24 2*   < > 23 8*   PLATELETS Thousands/uL 121*   < > 114*   NEUTROS PCT %  --   --  68   LYMPHS PCT %  --   --  17   MONOS PCT %  --   --  12   EOS PCT %  --   --  2    < > = values in this interval not displayed  Results from last 7 days   Lab Units 08/02/22  0845   SODIUM mmol/L 134*   POTASSIUM mmol/L 4 6   CHLORIDE mmol/L 99   CO2 mmol/L 30   BUN mg/dL 24   CREATININE mg/dL 5 70*   ANION GAP mmol/L 5   CALCIUM mg/dL 9 0   GLUCOSE RANDOM mg/dL 88     Results from last 7 days   Lab Units 08/03/22  0638   INR  2 72*     Results from last 7 days   Lab Units 08/03/22  1110 08/03/22  1000 08/03/22  0813 08/03/22  0718 08/02/22  2203 08/02/22  2054 08/02/22  1621 08/02/22  1443 08/02/22  1326 08/02/22  0714 08/02/22  0633 08/01/22  2127   POC GLUCOSE mg/dl 168* 184* 63* 63* 110 133 133 182* 83 90 87 115               Lines/Drains:  Invasive Devices  Report    Peripheral Intravenous Line  Duration           Peripheral IV 08/03/22 Right Antecubital <1 day          Line  Duration           Hemodialysis AV Fistula 02/20/18 Left Forearm 1624 days                      Imaging: Reviewed radiology reports from this admission including: MRI spine    Recent Cultures (last 7 days):   Results from last 7 days   Lab Units 07/29/22  1218   BLOOD CULTURE  No Growth After 4 Days  No Growth After 4 Days         Last 24 Hours Medication List:   Current Facility-Administered Medications   Medication Dose Route Frequency Provider Last Rate    albuterol  2 puff Inhalation Q6H PRN Buck Chew MD      ALPRAZolam  0 25 mg Oral BID PRN Buck Chew MD      atorvastatin  20 mg Oral QPM Buck Chew MD      b complex-vitamin C-folic acid  1 capsule Oral Daily With Oskar Hawthorne MD      carvedilol  25 mg Oral BID Buck Chew MD      cinacalcet  30 mg Oral Daily MD Gama Kaye dicyclomine  10 mg Oral 4x Daily (AC & HS) Gwendolyn Gan MD      HYDROmorphone  0 2 mg Intravenous Q4H PRN Loida Juan PA-C      insulin glargine  9 Units Subcutaneous HS Jc Winters DO      insulin lispro  1-5 Units Subcutaneous TID AC Gwendolyn Gan MD      insulin lispro  1-5 Units Subcutaneous HS Gwendolyn Gan MD      levothyroxine  50 mcg Oral Daily Gwendolyn Gan MD      lidocaine  1 patch Topical Daily Gwendolyn Gan MD      loratadine  10 mg Oral Daily Gwendolyn Gan MD      LORazepam  1 mg Intravenous Once Sandra Miguel, LOIS      melatonin  3 mg Oral HS Gwendolyn Gan MD      menthol-methyl salicylate   Apply externally 4x Daily PRN Sunday MD Naren      nystatin   Topical BID Gwendolyn Gan MD      ondansetron  4 mg Intravenous Q4H PRN Gwendolyn Gan MD      oxyCODONE-acetaminophen  1 tablet Oral Q6H PRN LOIS Vidales      Or    oxyCODONE-acetaminophen  2 tablet Oral Q6H PRN LOIS Vidales      pantoprazole  40 mg Oral Early Morning Gwendolyn Gan MD      polyethylene glycol  17 g Oral Daily Gwendolyn Gan MD      pregabalin  50 mg Oral Daily Gwendolyn Gan MD      saliva substitute  5 spray Mouth/Throat 4x Daily PRN Kiersten S Rayna, LOIS      senna  2 tablet Oral HS LOIS Vidales      sertraline  75 mg Oral Daily Gwendolyn Gan MD      sevelamer  1,600 mg Oral TID With Meals Gwendolyn Gan MD      torsemide  100 mg Oral Daily Gwendolyn Gan MD      warfarin  9 mg Oral Daily (warfarin) Gwendolyn Gan MD          Today, Patient Was Seen By: Jc Winters DO    **Please Note: This note may have been constructed using a voice recognition system  **

## 2022-08-03 NOTE — NURSING NOTE
The patient answers orientation questions appropriately, but is off on the situation  The patient's mood tends to be somewhat labile from happy and laughing, to not answering and no emotion  No certain unilateral neuro deficit  The patient was complaining of 8/10 pain and was given her PRN Percocet  The pain continued, and the patient requested her dilaudid and xanax some time after  The patient had the same thing going on with the labile mood, and would not answer questions regarding her pain rating and location and   The patient appeared calmed and not in distress  The resident with slim was notified  The PRN xanax and dilaudid were held, due to mentation/lethargy  The patient didn't ask for them again  The patient's lantus was given for a blood sugar of 133, no humalog was given  After looking in the patient's room, a novolog insulin pen was found  The patient claimed she was not using it and had no lancets  Blood sugar was checked again and was 110  The pen was confiscated with patient's label and put in medbox  Will continue to monitor

## 2022-08-03 NOTE — ASSESSMENT & PLAN NOTE
- acute on chronic intractable low back pain with bilateral lower extremity radiculopathy  - MRI with findings concerning for diskitis/osteomyelitis  - seen by infectious disease:  Infection thought to be less likely though cannot be fully excluded  - seen by Neurosurgery:  Very extensive review of the case performed by Dr Shari Miller, please refer to full dictation of progress note dated 08/03  After thorough review of imaging this hospitalization, and in comparison to previous imaging, there is no evidence to suggest active infection  More over there is no evidence to suggest urgent surgical intervention is needed  Again, please refer to his full dictation for further details  - no need for antibiotics  - discharge home with conservative measures for pain management including LSO brace for comfort, p r n   Percocet, Lyrica, and lidocaine patch per pain management team   - will send outpatient referral to Arlene Najera  - outpatient PT/OT

## 2022-08-03 NOTE — PLAN OF CARE
Problem: Potential for Falls  Goal: Patient will remain free of falls  Description: INTERVENTIONS:  - Educate patient/family on patient safety including physical limitations  - Instruct patient to call for assistance with activity   - Consult OT/PT to assist with strengthening/mobility   - Keep Call bell within reach  - Keep bed low and locked with side rails adjusted as appropriate  - Keep care items and personal belongings within reach  - Initiate and maintain comfort rounds  - Apply yellow socks and bracelet for high fall risk patients  - Consider moving patient to room near nurses station  Outcome: Progressing     Problem: PAIN - ADULT  Goal: Verbalizes/displays adequate comfort level or baseline comfort level  Description: Interventions:  - Encourage patient to monitor pain and request assistance  - Assess pain using appropriate pain scale  - Administer analgesics based on type and severity of pain and evaluate response  - Implement non-pharmacological measures as appropriate and evaluate response  - Consider cultural and social influences on pain and pain management  - Notify physician/advanced practitioner if interventions unsuccessful or patient reports new pain  Outcome: Progressing     Problem: INFECTION - ADULT  Goal: Absence or prevention of progression during hospitalization  Description: INTERVENTIONS:  - Assess and monitor for signs and symptoms of infection  - Monitor lab/diagnostic results  - Monitor all insertion sites, i e  indwelling lines, tubes, and drains  - Monitor endotracheal if appropriate and nasal secretions for changes in amount and color  - [place] appropriate cooling/warming therapies per order  - Administer medications as ordered  - Instruct and encourage patient and family to use good hand hygiene technique  - Identify and instruct in appropriate isolation precautions for identified infection/condition  Outcome: Progressing  Goal: Absence of fever/infection during neutropenic period  Description: INTERVENTIONS:  - Monitor WBC    Outcome: Progressing     Problem: SAFETY ADULT  Goal: Patient will remain free of falls  Description: INTERVENTIONS:  - Educate patient/family on patient safety including physical limitations  - Instruct patient to call for assistance with activity   - Consult OT/PT to assist with strengthening/mobility   - Keep Call bell within reach  - Keep bed low and locked with side rails adjusted as appropriate  - Keep care items and personal belongings within reach  - Initiate and maintain comfort rounds  - Make Fall Risk Sign visible to staff  - Apply yellow socks and bracelet for high fall risk patients  - Consider moving patient to room near nurses station  Outcome: Progressing  Goal: Maintain or return to baseline ADL function  Description: INTERVENTIONS:  -  Assess patient's ability to carry out ADLs; assess patient's baseline for ADL function and identify physical deficits which impact ability to perform ADLs (bathing, care of mouth/teeth, toileting, grooming, dressing, etc )  - Assess/evaluate cause of self-care deficits   - Assess range of motion  - Assess patient's mobility; develop plan if impaired  - Assess patient's need for assistive devices and provide as appropriate  - Encourage maximum independence but intervene and supervise when necessary  - Involve family in performance of ADLs  - Assess for home care needs following discharge   - Consider OT consult to assist with ADL evaluation and planning for discharge  - Provide patient education as appropriate  Outcome: Progressing  Goal: Maintains/Returns to pre admission functional level  Description: INTERVENTIONS:  - Perform BMAT or MOVE assessment daily    - Set and communicate daily mobility goal to care team and patient/family/caregiver     - Collaborate with rehabilitation services on mobility goals if consulted  - Out of bed for toileting  - Record patient progress and toleration of activity level Outcome: Progressing     Problem: DISCHARGE PLANNING  Goal: Discharge to home or other facility with appropriate resources  Description: INTERVENTIONS:  - Identify barriers to discharge w/patient and caregiver  - Arrange for needed discharge resources and transportation as appropriate  - Identify discharge learning needs (meds, wound care, etc )  - Arrange for interpretive services to assist at discharge as needed  - Refer to Case Management Department for coordinating discharge planning if the patient needs post-hospital services based on physician/advanced practitioner order or complex needs related to functional status, cognitive ability, or social support system  Outcome: Progressing     Problem: Knowledge Deficit  Goal: Patient/family/caregiver demonstrates understanding of disease process, treatment plan, medications, and discharge instructions  Description: Complete learning assessment and assess knowledge base    Interventions:  - Provide teaching at level of understanding  - Provide teaching via preferred learning methods  Outcome: Progressing     Problem: MOBILITY - ADULT  Goal: Maintain or return to baseline ADL function  Description: INTERVENTIONS:  -  Assess patient's ability to carry out ADLs; assess patient's baseline for ADL function and identify physical deficits which impact ability to perform ADLs (bathing, care of mouth/teeth, toileting, grooming, dressing, etc )  - Assess/evaluate cause of self-care deficits   - Assess range of motion  - Assess patient's mobility; develop plan if impaired  - Assess patient's need for assistive devices and provide as appropriate  - Encourage maximum independence but intervene and supervise when necessary  - Involve family in performance of ADLs  - Assess for home care needs following discharge   - Consider OT consult to assist with ADL evaluation and planning for discharge  - Provide patient education as appropriate  Outcome: Progressing  Goal: Maintains/Returns to pre admission functional level  Description: INTERVENTIONS:  - Perform BMAT or MOVE assessment daily    - Set and communicate daily mobility goal to care team and patient/family/caregiver  - Collaborate with rehabilitation services on mobility goals if consulted  - Out of bed for toileting  - Record patient progress and toleration of activity level   Outcome: Progressing     Problem: Nutrition/Hydration-ADULT  Goal: Nutrient/Hydration intake appropriate for improving, restoring or maintaining nutritional needs  Description: Monitor and assess patient's nutrition/hydration status for malnutrition  Collaborate with interdisciplinary team and initiate plan and interventions as ordered  Monitor patient's weight and dietary intake as ordered or per policy  Utilize nutrition screening tool and intervene as necessary  Determine patient's food preferences and provide high-protein, high-caloric foods as appropriate       INTERVENTIONS:  - Monitor oral intake, urinary output, labs, and treatment plans  - Assess nutrition and hydration status and recommend course of action  - Evaluate amount of meals eaten  - Assist patient with eating if necessary   - Allow adequate time for meals  - Recommend/ encourage appropriate diets, oral nutritional supplements, and vitamin/mineral supplements  - Order, calculate, and assess calorie counts as needed  - Recommend, monitor, and adjust tube feedings and TPN/PPN based on assessed needs  - Assess need for intravenous fluids  - Provide specific nutrition/hydration education as appropriate  - Include patient/family/caregiver in decisions related to nutrition  Outcome: Progressing     Problem: METABOLIC, FLUID AND ELECTROLYTES - ADULT  Goal: Electrolytes maintained within normal limits  Description: INTERVENTIONS:  - Monitor labs and assess patient for signs and symptoms of electrolyte imbalances  - Administer electrolyte replacement as ordered  - Monitor response to electrolyte replacements, including repeat lab results as appropriate  - Instruct patient on fluid and nutrition as appropriate  Outcome: Progressing  Goal: Fluid balance maintained  Description: INTERVENTIONS:  - Monitor labs   - Monitor I/O and WT  - Instruct patient on fluid and nutrition as appropriate  - Assess for signs & symptoms of volume excess or deficit  Outcome: Progressing     Problem: Prexisting or High Potential for Compromised Skin Integrity  Goal: Skin integrity is maintained or improved  Description: INTERVENTIONS:  - Identify patients at risk for skin breakdown  - Assess and monitor skin integrity  - Assess and monitor nutrition and hydration status  - Monitor labs   - Assess for incontinence   - Turn and reposition patient  - Assist with mobility/ambulation  - Relieve pressure over bony prominences  - Avoid friction and shearing  - Provide appropriate hygiene as needed including keeping skin clean and dry  - Evaluate need for skin moisturizer/barrier cream  - Collaborate with interdisciplinary team   - Patient/family teaching  - Consider wound care consult   Outcome: Progressing

## 2022-08-03 NOTE — PROGRESS NOTES
[facility] 52 PROGRESS NOTE   Cam Meeks 56 y o  female MRN: [id number]  [facility] Encounter: [id number]  Reason for Consult: ESRD on HD     ASSESSMENT and PLAN:  51-year-old female with history of end-stage renal disease on HD on TTS schedule currently admitted for worsening of lower back pain  We are consulted for evaluation management of ESRD  1  ESRD on HD  - Schedule: T/T/S at [address]  - HD tomorrow per schedule   - Access: Left forearm AV fistula    2  HTN/Volume   - Medications: Carvedilol 25 mg b i d and Torsemide 100 mg daily   - UF: Aim for  kg (standing scale), she came off at 120 kg after HD yesterday but it was a bed scale weight   - Nifedipine stopped to allow ultrafiltration on dialysis    3  Anemia   - Goal Hb 10-11, currently below goal  - Outpatient medications: IV iron 50 mg every week, no EPOGEN due to history of DVT/PE  - Transfusion threshold Hb < 7     4  Electrolytes   - Check BMP tomorrow     5  Bone Mineral Disease   - Medications: Sensipar 30 mg daily, Renvela 2 tablets t i d  with meals  - Goal serum phosphate < 5 5, currently at goal     6  Acute on chronic back pain  - MRI 08/01/2022: Findings most suggestive for subacute L4-5 osteomyelitis with lesser degree discitis  - Blood cultures negative to date, observation off antibiotics per infectious disease   - Plans noted for IR guided lumbar disc biopsy for further evaluation and consideration for surgery in future   - As far as a neurosurgical procedure is concerned there is no prohibition from a renal perspective  - We can decide based on intra-op and post-op hemodynamic status to do CRRT versus hemodialysis  - Dose of all pre-op, intra-op and post-op medications would need to be adjusted for hemodialysis     DISPOSITION:  - HD tomorrow     SUBJECTIVE / 24H INTERVAL HISTORY:  Patient seen and examined at bedside this morning  She had dialysis yesterday with 2 5 L UF   Today she denies chest pain or dyspnea  Review of Systems   Constitutional: Negative for chills and fever  HENT: Negative for ear pain and sore throat  Eyes: Negative for pain and visual disturbance  Respiratory: Negative for cough and shortness of breath  Cardiovascular: Negative for chest pain and palpitations  Gastrointestinal: Negative for abdominal pain and vomiting  Genitourinary: Negative for dysuria and hematuria  Musculoskeletal: Positive for back pain  Negative for arthralgias  Skin: Negative for color change and rash  Neurological: Negative for seizures and syncope  All other systems reviewed and are negative      OBJECTIVE:  Current Weight: Weight - Scale: 130 kg (286 lb 9 6 oz)  Vitals:    08/02/22 1515 08/02/22 2300 08/03/22 0722 08/03/22 0821   BP: (!) 141/48 152/54 (!) 94/49 117/72   BP Location:       Pulse: 80 82     Resp: 20  20    Temp: 98 1 °F (36 7 °C) 98 2 °F (36 8 °C) 98 7 °F (37 1 °C)    TempSrc: Oral Oral     SpO2:  93%     Weight:       Height:           Intake/Output Summary (Last 24 hours) at 8/3/2022 1052  Last data filed at 8/3/2022 0900  Gross per 24 hour   Intake 640 ml   Output 3006 ml   Net -2366 ml     General: Awake and alert   Eyes: Conjunctivae pink, sclera anicteric  ENT: Lips and mucous membranes moist  Neck: Supple   Chest: Clear to auscultation bilaterally   CVS: S1 & S2 present, normal rate, regular rhythm, no murmur  Abdomen: Soft, non-tender, non-distended, Bowel sounds normoactive  Extremities: No edema bilaterally   Skin: No rash  Neuro: Awake, alert and oriented  Psych: Mood and affect appropriate   Access: Left forearm AV fistula with positive thrill and bruit     Medications:    Current Facility-Administered Medications:     albuterol (PROVENTIL HFA,VENTOLIN HFA) inhaler 2 puff, 2 puff, Inhalation, Q6H PRN, Clayton Mayer MD    ALPRAZolam (XANAX) tablet 0 25 mg, 0 25 mg, Oral, BID PRN, Clayton Mayer MD, 0 25 mg at 08/03/22 1023    atorvastatin (LIPITOR) tablet 20 mg, 20 mg, Oral, QPM, Maximilian Marmolejo MD, 20 mg at 08/02/22 1716    b complex-vitamin C-folic acid (NEPHROCAPS) capsule 1 capsule, 1 capsule, Oral, Daily With Manny Joyner MD, 1 capsule at 08/02/22 1716    carvedilol (COREG) tablet 25 mg, 25 mg, Oral, BID, Maximilian Marmolejo MD, 25 mg at 08/03/22 8914    cinacalcet (SENSIPAR) tablet 30 mg, 30 mg, Oral, Daily, Maximilian Marmolejo MD, 30 mg at 08/03/22 9805    dicyclomine (BENTYL) capsule 10 mg, 10 mg, Oral, 4x Daily (AC & HS), Maximilian Marmolejo MD, 10 mg at 08/03/22 7793    HYDROmorphone HCl (DILAUDID) injection 0 2 mg, 0 2 mg, Intravenous, Q4H PRN, Margarita Soriano PA-C, 0 2 mg at 08/02/22 1329    insulin glargine (LANTUS) subcutaneous injection 9 Units 0 09 mL, 9 Units, Subcutaneous, HS, Lanice Stager Starsinic, DO    insulin lispro (HumaLOG) 100 units/mL subcutaneous injection 1-5 Units, 1-5 Units, Subcutaneous, TID AC, 1 Units at 07/31/22 1657 **AND** Fingerstick Glucose (POCT), , , TID AC, Alexander Ramirez MD    insulin lispro (HumaLOG) 100 units/mL subcutaneous injection 1-5 Units, 1-5 Units, Subcutaneous, HS, Maximilian Marmolejo MD, 2 Units at 07/30/22 2100    levothyroxine tablet 50 mcg, 50 mcg, Oral, Daily, Maximilian Marmolejo MD, 50 mcg at 08/03/22 0613    lidocaine (LIDODERM) 5 % patch 1 patch, 1 patch, Topical, Daily, Maximilian Marmolejo MD, 1 patch at 08/03/22 0356    loratadine (CLARITIN) tablet 10 mg, 10 mg, Oral, Daily, Maximilian Marmolejo MD, 10 mg at 08/03/22 5014    LORazepam (ATIVAN) injection 1 mg, 1 mg, Intravenous, Once, LOIS Mills    melatonin tablet 3 mg, 3 mg, Oral, HS, Alexander Ramirez MD, 3 mg at 08/01/22 2238    menthol-methyl salicylate (BENGAY) 05-72 % cream, , Apply externally, 4x Daily PRN, Maryana Nelson MD, 1 application at 58/34/50 2135    nystatin (MYCOSTATIN) powder, , Topical, BID, Maximilian Marmolejo MD, Given at 08/03/22 0822    ondansetron (ZOFRAN) injection 4 mg, 4 mg, Intravenous, Q4H PRN, Maximilian Marmolejo MD, 4 mg at 08/02/22 1730    oxyCODONE-acetaminophen (PERCOCET) 5-325 mg per tablet 1 tablet, 1 tablet, Oral, Q6H PRN **OR** oxyCODONE-acetaminophen (PERCOCET) 5-325 mg per tablet 2 tablet, 2 tablet, Oral, Q6H PRN, LOIS Khan, 2 tablet at 08/02/22 1721    pantoprazole (PROTONIX) EC tablet 40 mg, 40 mg, Oral, Early Morning, Lina Thrasher MD, 40 mg at 08/03/22 2831    polyethylene glycol (MIRALAX) packet 17 g, 17 g, Oral, Daily, Lina Thrasher MD, 17 g at 08/03/22 0826    pregabalin (LYRICA) capsule 50 mg, 50 mg, Oral, Daily, Lina Thrasher MD, 50 mg at 08/03/22 0152    saliva substitute (MOUTH KOTE) mucosal solution 5 spray, 5 spray, Mouth/Throat, 4x Daily PRN, Kiersten PulidoxLOIS    senna (SENOKOT) tablet 17 2 mg, 2 tablet, Oral, HS, Charlacarol Ordoñezll LOIS Alvarenga, 17 2 mg at 08/02/22 2131    sertraline (ZOLOFT) tablet 75 mg, 75 mg, Oral, Daily, Lina Thrasher MD, 75 mg at 08/03/22 8838    sevelamer (RENAGEL) tablet 1,600 mg, 1,600 mg, Oral, TID With Meals, Lina Thrasher MD, 1,600 mg at 08/03/22 9053    torsemide (DEMADEX) tablet 100 mg, 100 mg, Oral, Daily, Lina Thrasher MD, 100 mg at 08/03/22 8033    warfarin (COUMADIN) tablet 9 mg, 9 mg, Oral, Daily (warfarin), Lina Thrasher MD, 9 mg at 08/02/22 1716    Laboratory Results:  Results from last 7 days   Lab Units 08/02/22  0845 08/01/22  0758 07/31/22  0546 07/30/22  0901 07/29/22  1218   WBC Thousand/uL 4 77 4 89 4 57 5 16 4 67   HEMOGLOBIN g/dL 7 7* 8 1* 7 3* 7 4* 7 5*   HEMATOCRIT % 24 2* 25 4* 23 5* 24 2* 23 8*   PLATELETS Thousands/uL 121* 140* 122* 112* 114*   POTASSIUM mmol/L 4 6 5 3 4 7 5 0 5 3   CHLORIDE mmol/L 99 101 101 106 105   CO2 mmol/L 30 31 34* 30 30   BUN mg/dL 24 39* 30* 53* 47*   CREATININE mg/dL 5 70* 7 92* 5 99* 8 21* 6 83*   CALCIUM mg/dL 9 0 8 5 8 5 8 6 8 6   PHOSPHORUS mg/dL 5 3*  --   --   --   --

## 2022-08-03 NOTE — ASSESSMENT & PLAN NOTE
Lab Results   Component Value Date    EGFR 7 08/02/2022    EGFR 5 08/01/2022    EGFR 7 07/31/2022    CREATININE 5 70 (H) 08/02/2022    CREATININE 7 92 (H) 08/01/2022    CREATININE 5 99 (H) 07/31/2022     Nephrology following closely  Scheduled for dialysis T/T/Sa outpatient

## 2022-08-03 NOTE — ASSESSMENT & PLAN NOTE
Lab Results   Component Value Date    HGBA1C 9 4 (H) 07/09/2022       Recent Labs     08/03/22  0813 08/03/22  1000 08/03/22  1110 08/03/22  1621   POCGLU 63* 184* 168* 149*       Blood Sugar Average: Last 72 hrs:  (P) 122 55     SSI   Medical management per primary team

## 2022-08-03 NOTE — PROGRESS NOTES
Progress Note - Infectious Disease   Barb Tse 56 y o  female MRN: [id number]  Unit/Bed#: [facility] 814-01 Encounter: [id number]      Impression/Recommendations:  1   Possible L4-5 discitis/OM    In setting of #2   MRI with non-specific disc/endplate changes with minimal enhancement, stable over 3 weeks   ESR, CRP bland   Blood cultures negative   Lower suspicion for infection, but cannot exclude  Rec:  · Continue to follow closely off antibiotics  · IR-guided percutaneous biopsy for culture - if negative possible fusion surgery per Neurosurgery     2   Acute on chronic low back pain   Known central stenosis with spondylosis, disc herniation on MRI    Recent worsening in setting of fall early July   Followed by pain management with prior LESIs, most recently in May   Also on pregabalin   Receiving narcotics in-house (60 mg oxycodone, 1 mg dilaudid over 24 hours)  Rec:  · APS follow-up ongoing     3   Right hand thrombophlebitis  Due to prior PIV  No active cellulitis  Erythema on exam is cool, non-blanching, more consistent with ecchymosis  Rec:  · Continue to follow closely off antibiotics  · Serial exams    4  ESRD on HD   Via AVF      5   Poorly controlled DM with DPN        The above impression and plan was discussed in detail with the patient and Dr Cecile Baker      Antibiotics:  None    Subjective:  Patient seen on AM rounds  Back pain stable  Right hand hurts at site of prior IV     24 Hour Events:  No documented fevers, chills, sweats, nausea, vomiting, or diarrhea      Objective:  Vitals:  Temp:  [98 1 °F (36 7 °C)-98 7 °F (37 1 °C)] 98 7 °F (37 1 °C)  HR:  [80-82] 82  Resp:  [20] 20  BP: ()/(48-72) 117/72  SpO2:  [93 %] 93 %  Temp (24hrs), Av 3 °F (36 8 °C), Min:98 1 °F (36 7 °C), Max:98 7 °F (37 1 °C)  Current: Temperature: 98 7 °F (37 1 °C)    Physical Exam:   General:  No acute distress  Psychiatric:  Awake and alert  Pulmonary:  Normal respiratory excursion without accessory muscle use  Abdomen:  Soft, nontender  Extremities:  Right dorsal hand with 4cm area cool, non-blanching, dark erythema with tender palpable cord with central scab  Skin:  No rashes    Lab Results:  I have personally reviewed pertinent labs  Results from last 7 days   Lab Units 08/02/22  0845 08/01/22  0758 07/31/22  0546   POTASSIUM mmol/L 4 6 5 3 4 7   CHLORIDE mmol/L 99 101 101   CO2 mmol/L 30 31 34*   BUN mg/dL 24 39* 30*   CREATININE mg/dL 5 70* 7 92* 5 99*   EGFR ml/min/1 73sq m 7 5 7   CALCIUM mg/dL 9 0 8 5 8 5     Results from last 7 days   Lab Units 08/02/22  0845 08/01/22  0758 07/31/22  0546   WBC Thousand/uL 4 77 4 89 4 57   HEMOGLOBIN g/dL 7 7* 8 1* 7 3*   PLATELETS Thousands/uL 121* 140* 122*     Results from last 7 days   Lab Units 07/29/22  1218   BLOOD CULTURE  No Growth After 4 Days  No Growth After 4 Days  Imaging Studies:   I have personally reviewed pertinent imaging study reports and images in PACS  EKG, Pathology, and Other Studies:   I have personally reviewed pertinent reports

## 2022-08-04 ENCOUNTER — APPOINTMENT (INPATIENT)
Dept: DIALYSIS | Facility: HOSPITAL | Age: 57
DRG: 551 | End: 2022-08-04
Payer: MEDICARE

## 2022-08-04 VITALS
HEART RATE: 110 BPM | HEIGHT: 66 IN | SYSTOLIC BLOOD PRESSURE: 131 MMHG | WEIGHT: 286.6 LBS | TEMPERATURE: 98.4 F | DIASTOLIC BLOOD PRESSURE: 81 MMHG | OXYGEN SATURATION: 93 % | BODY MASS INDEX: 46.06 KG/M2 | RESPIRATION RATE: 16 BRPM

## 2022-08-04 PROBLEM — L03.90 CELLULITIS: Status: ACTIVE | Noted: 2022-08-04

## 2022-08-04 LAB
ANION GAP SERPL CALCULATED.3IONS-SCNC: 10 MMOL/L (ref 4–13)
BUN SERPL-MCNC: 30 MG/DL (ref 5–25)
CALCIUM SERPL-MCNC: 9 MG/DL (ref 8.3–10.1)
CHLORIDE SERPL-SCNC: 99 MMOL/L (ref 96–108)
CO2 SERPL-SCNC: 27 MMOL/L (ref 21–32)
CREAT SERPL-MCNC: 6.88 MG/DL (ref 0.6–1.3)
ERYTHROCYTE [DISTWIDTH] IN BLOOD BY AUTOMATED COUNT: 14.6 % (ref 11.6–15.1)
GFR SERPL CREATININE-BSD FRML MDRD: 6 ML/MIN/1.73SQ M
GLUCOSE SERPL-MCNC: 109 MG/DL (ref 65–140)
GLUCOSE SERPL-MCNC: 128 MG/DL (ref 65–140)
GLUCOSE SERPL-MCNC: 133 MG/DL (ref 65–140)
GLUCOSE SERPL-MCNC: 153 MG/DL (ref 65–140)
HCT VFR BLD AUTO: 25.4 % (ref 34.8–46.1)
HGB BLD-MCNC: 8 G/DL (ref 11.5–15.4)
INR PPP: 3.49 (ref 0.84–1.19)
MCH RBC QN AUTO: 31.6 PG (ref 26.8–34.3)
MCHC RBC AUTO-ENTMCNC: 31.5 G/DL (ref 31.4–37.4)
MCV RBC AUTO: 100 FL (ref 82–98)
PHOSPHATE SERPL-MCNC: 6.3 MG/DL (ref 2.7–4.5)
PLATELET # BLD AUTO: 165 THOUSANDS/UL (ref 149–390)
PMV BLD AUTO: 10.8 FL (ref 8.9–12.7)
POTASSIUM SERPL-SCNC: 4.1 MMOL/L (ref 3.5–5.3)
PROTHROMBIN TIME: 35.3 SECONDS (ref 11.6–14.5)
RBC # BLD AUTO: 2.53 MILLION/UL (ref 3.81–5.12)
SODIUM SERPL-SCNC: 136 MMOL/L (ref 135–147)
WBC # BLD AUTO: 6.17 THOUSAND/UL (ref 4.31–10.16)

## 2022-08-04 PROCEDURE — 84100 ASSAY OF PHOSPHORUS: CPT | Performed by: INTERNAL MEDICINE

## 2022-08-04 PROCEDURE — 99239 HOSP IP/OBS DSCHRG MGMT >30: CPT | Performed by: INTERNAL MEDICINE

## 2022-08-04 PROCEDURE — 85610 PROTHROMBIN TIME: CPT | Performed by: INTERNAL MEDICINE

## 2022-08-04 PROCEDURE — 85027 COMPLETE CBC AUTOMATED: CPT | Performed by: INTERNAL MEDICINE

## 2022-08-04 PROCEDURE — 90935 HEMODIALYSIS ONE EVALUATION: CPT | Performed by: INTERNAL MEDICINE

## 2022-08-04 PROCEDURE — 80048 BASIC METABOLIC PNL TOTAL CA: CPT | Performed by: INTERNAL MEDICINE

## 2022-08-04 PROCEDURE — 82948 REAGENT STRIP/BLOOD GLUCOSE: CPT

## 2022-08-04 PROCEDURE — 99232 SBSQ HOSP IP/OBS MODERATE 35: CPT | Performed by: INTERNAL MEDICINE

## 2022-08-04 RX ORDER — DOXYCYCLINE HYCLATE 100 MG/1
100 CAPSULE ORAL EVERY 12 HOURS SCHEDULED
Status: DISCONTINUED | OUTPATIENT
Start: 2022-08-04 | End: 2022-08-04 | Stop reason: HOSPADM

## 2022-08-04 RX ORDER — LIDOCAINE 50 MG/G
1 PATCH TOPICAL DAILY
Qty: 14 PATCH | Refills: 0 | Status: SHIPPED | OUTPATIENT
Start: 2022-08-05

## 2022-08-04 RX ORDER — DOXYCYCLINE HYCLATE 100 MG/1
100 CAPSULE ORAL EVERY 12 HOURS SCHEDULED
Qty: 14 CAPSULE | Refills: 0 | Status: SHIPPED | OUTPATIENT
Start: 2022-08-04 | End: 2022-08-21

## 2022-08-04 RX ORDER — OXYCODONE HYDROCHLORIDE AND ACETAMINOPHEN 5; 325 MG/1; MG/1
1 TABLET ORAL EVERY 6 HOURS PRN
Qty: 20 TABLET | Refills: 0 | Status: SHIPPED | OUTPATIENT
Start: 2022-08-04 | End: 2022-08-21

## 2022-08-04 RX ADMIN — SERTRALINE HYDROCHLORIDE 75 MG: 50 TABLET ORAL at 12:58

## 2022-08-04 RX ADMIN — LIDOCAINE 5% 1 PATCH: 700 PATCH TOPICAL at 12:58

## 2022-08-04 RX ADMIN — PANTOPRAZOLE SODIUM 40 MG: 40 TABLET, DELAYED RELEASE ORAL at 06:20

## 2022-08-04 RX ADMIN — LORATADINE 10 MG: 10 TABLET ORAL at 12:58

## 2022-08-04 RX ADMIN — POLYETHYLENE GLYCOL 3350 17 G: 17 POWDER, FOR SOLUTION ORAL at 12:58

## 2022-08-04 RX ADMIN — PREGABALIN 50 MG: 50 CAPSULE ORAL at 12:58

## 2022-08-04 RX ADMIN — LEVOTHYROXINE SODIUM 50 MCG: 50 TABLET ORAL at 06:19

## 2022-08-04 RX ADMIN — NYSTATIN: 100000 POWDER TOPICAL at 13:16

## 2022-08-04 RX ADMIN — ALPRAZOLAM 0.25 MG: 0.25 TABLET ORAL at 09:40

## 2022-08-04 RX ADMIN — OXYCODONE HYDROCHLORIDE AND ACETAMINOPHEN 2 TABLET: 5; 325 TABLET ORAL at 06:28

## 2022-08-04 RX ADMIN — DICYCLOMINE HYDROCHLORIDE 10 MG: 10 CAPSULE ORAL at 12:58

## 2022-08-04 RX ADMIN — OXYCODONE HYDROCHLORIDE AND ACETAMINOPHEN 2 TABLET: 5; 325 TABLET ORAL at 13:13

## 2022-08-04 RX ADMIN — CINACALCET 30 MG: 30 TABLET, FILM COATED ORAL at 12:58

## 2022-08-04 RX ADMIN — DOXYCYCLINE 100 MG: 100 CAPSULE ORAL at 15:02

## 2022-08-04 RX ADMIN — DICYCLOMINE HYDROCHLORIDE 10 MG: 10 CAPSULE ORAL at 06:20

## 2022-08-04 RX ADMIN — TORSEMIDE 100 MG: 20 TABLET ORAL at 12:57

## 2022-08-04 RX ADMIN — SEVELAMER HYDROCHLORIDE 1600 MG: 800 TABLET ORAL at 12:57

## 2022-08-04 NOTE — PROGRESS NOTES
Progress Note - Infectious Disease   Josef Stable 56 y o  female MRN: [id number]  Unit/Bed#: [facility] 814-01 Encounter: [id number]      Impression/Recommendations:  1   Possible L4-5 discitis/OM    In setting of #2   MRI with non-specific disc/endplate changes with minimal enhancement, stable over 3 weeks   ESR, CRP bland   Blood cultures negative   Low suspicions for active infection  May represent "burned out" infection  Rec:  · Continue to follow closely off antibiotics  · Agree with deferring in biopsy as suspect low yield     2   Acute on chronic low back pain   Known central stenosis with spondylosis, disc herniation on MRI    Recent worsening in setting of fall early July   Followed by pain management with prior LESIs, most recently in May   Also on pregabalin   Receiving narcotics in-house (60 mg oxycodone, 1 mg dilaudid over 24 hours)  Rec:  · APS follow-up ongoing  · Outpatient Neurosurgery follow-up     3   Right hand thrombophlebitis  Due to prior PIV  Persistent pain, erythema suggests infection  Rec:  · As no longer pursuing biopsy, start Doxycycline 100 mg PO BID x7 days     4  ESRD on HD   Via AVF      5   Poorly controlled DM with DPN        The above impression and plan was discussed in detail with the patient and Dr Dorothy Davila  The patient is stable from an ID standpoint for D/C      Antibiotics:  None    Subjective:  Patient seen on AM rounds  Back pain seems stable  Still has redness and tenderness right hand  24 Hour Events:  No documented fevers, chills, sweats, nausea, vomiting, or diarrhea      Objective:  Vitals:  Temp:  [98 °F (36 7 °C)-98 4 °F (36 9 °C)] 98 4 °F (36 9 °C)  HR:  [] 110  Resp:  [16-20] 16  BP: ()/(45-89) 131/81  Temp (24hrs), Av 2 °F (36 8 °C), Min:98 °F (36 7 °C), Max:98 4 °F (36 9 °C)  Current: Temperature: 98 4 °F (36 9 °C)    Physical Exam:   General:  No acute distress  Psychiatric:  Awake and alert  Pulmonary:  Normal respiratory excursion without accessory muscle use  Abdomen:  Soft, nontender  Extremities:  Tender cord with surrounding erythema  Skin:  No rashes    Lab Results:  I have personally reviewed pertinent labs  Results from last 7 days   Lab Units 08/04/22  0815 08/02/22  0845 08/01/22  0758   POTASSIUM mmol/L 4 1 4 6 5 3   CHLORIDE mmol/L 99 99 101   CO2 mmol/L 27 30 31   BUN mg/dL 30* 24 39*   CREATININE mg/dL 6 88* 5 70* 7 92*   EGFR ml/min/1 73sq m 6 7 5   CALCIUM mg/dL 9 0 9 0 8 5     Results from last 7 days   Lab Units 08/04/22  0815 08/02/22  0845 08/01/22  0758   WBC Thousand/uL 6 17 4 77 4 89   HEMOGLOBIN g/dL 8 0* 7 7* 8 1*   PLATELETS Thousands/uL 165 121* 140*     Results from last 7 days   Lab Units 07/29/22  1218   BLOOD CULTURE  No Growth After 5 Days  No Growth After 5 Days  Imaging Studies:   I have personally reviewed pertinent imaging study reports and images in PACS  EKG, Pathology, and Other Studies:   I have personally reviewed pertinent reports

## 2022-08-04 NOTE — ASSESSMENT & PLAN NOTE
- cellulitis had previous IV site on the right hand  - will treat with 7 day course of p o  doxycycline on discharge

## 2022-08-04 NOTE — DISCHARGE SUMMARY
[facility]  Discharge- Nelly Monahan 1965, 56 y o  female MRN: [id number]  Unit/Bed#: [facility] 814-01 Encounter: [id number]  Primary Care Provider: Codey Alfonso MD   Date and time admitted to hospital: 7/29/2022 11:10 AM    * Intractable back pain  Assessment & Plan  - acute on chronic intractable low back pain with bilateral lower extremity radiculopathy  - MRI with findings concerning for diskitis/osteomyelitis  - seen by infectious disease:  Infection thought to be less likely though cannot be fully excluded  - seen by Neurosurgery:  Very extensive review of the case performed by Dr Elan Lorenzo, please refer to full dictation of progress note dated 08/03  After thorough review of imaging this hospitalization, and in comparison to previous imaging, there is no evidence to suggest active infection  More over there is no evidence to suggest urgent surgical intervention is needed  Again, please refer to his full dictation for further details  - no need for antibiotics  - discharge home with conservative measures for pain management including LSO brace for comfort, p r n   Percocet, Lyrica, and lidocaine patch per pain management team   - will send outpatient referral to Ar[facility]  - outpatient PT/OT      Cellulitis  Assessment & Plan  - cellulitis had previous IV site on the right hand  - will treat with 7 day course of p o  doxycycline on discharge    Type 2 diabetes mellitus ([address] )  Assessment & Plan  - last hemoglobin A1c on 07/09/2022 at 9 4%  - patient to resume home regimen on discharge  - blood glucose has been variable while inpatient despite being on home regimen including some episodes of hypoglycemia  - patient instructed to follow-up closely with her PCP for further management    Morbid obesity  Assessment & Plan  - BMI 46  - affects all levels of care; diet and lifestyle modifications recommended    ESRD on hemodialysis  Assessment & Plan  - nephrology consulted for HD    History of venous thromboembolism  Assessment & Plan  - continue home Coumadin on discharge with routine outpatient INR checks    Essential hypertension  Assessment & Plan  - monitor on home carvedilol      Discharging Physician / Practitioner: aSmeer Schwab DO  PCP: Cherylene Fish, MD  Admission Date:   Admission Orders (From admission, onward)     Ordered        07/30/22 1618  Inpatient Admission  Once            07/29/22 1420  Place in Observation  Once                      Discharge Date: 08/04/22    Consultations During Hospital Stay:  · Infectious disease  · Neuro surgery  · Acute pain service    Procedures Performed:   · Non    Significant Findings / Test Results:   · MRI lumbar spine with findings suggestive of subacute L4/L5 osteomyelitis/diskitis  Incidental Findings:   · None    Test Results Pending at Discharge (will require follow up): · None     Outpatient Tests Requested:  · None    Complications:  None    Reason for Admission:  Intractable back pain    Hospital Course:   Erna Vital is a 56 y o  female patient who originally presented to the hospital on 7/29/2022 due to intractable back pain  She was admitted to the hospital service for further evaluation and management  An MRI of her lumbar spine revealed findings suggestive of subacute osteomyelitis/diskitis at the L4-L5 level  Infectious Disease and Neurosurgery were consulted  The patient was monitor off antibiotics for several days while inpatient  Much debate took place regarding further workup and/or surgical intervention by all disciplines involved in her care  Ultimately after a thorough review of all imaging past and present, current laboratory findings, and current clinical status it was felt that there was no evidence for acute infection of the spine    There is a very detailed note from Dr Clarence Crenshaw from neuro surgery dated 08/03 detail in much of these findings and decision-making process  Therefore conservative measures were recommended  She was referred to the [facility] management program as an outpatient on discharge  She will continue with home Lyrica, Percocet and p r n , an LSO brace, and ambulatory referrals for PT/OT  Discharge plan was discussed at length with the patient and her  at bedside  Following up with these conservative measures was strongly advised with understanding ending expressed by them  Otherwise patient was eager to return home  All questions and concerns were addressed    Please see above list of diagnoses and related plan for additional information  Condition at Discharge: stable    Discharge Day Visit / Exam:   Subjective:  Patient seen examined on the day of discharge  Doing well today  Pain adequately controlled  No new events reported overnight  Vitals: Blood Pressure: 131/81 (08/04/22 1145)  Pulse: (!) 110 (08/04/22 1145)  Temperature: 98 4 °F (36 9 °C) (08/04/22 1145)  Temp Source: Oral (08/04/22 1145)  Respirations: 16 (08/04/22 1145)  Height: 5' 6" (167 6 cm) (07/29/22 1700)  Weight - Scale: 130 kg (286 lb 9 6 oz) (07/29/22 1700)  SpO2: 93 % (08/02/22 2300)    PHYSICAL EXAM:    Vitals signs reviewed  Constitutional   Awake and cooperative  NAD  Head/Neck   Normocephalic  Atraumatic  HEENT   No scleral icterus  EOMI  Heart   Regular rate and rhythm  No murmers  Lungs   Clear to auscultation bilaterally  Respirations unlaboured  Abdomen   Soft  Nontender  Nondistended  Obese  Skin   Skin color normal  No rashes  Extremities   Right TMA  No peripheral edema  Neuro   Alert and oriented  No new deficits  Psych   Mood stable  Affect normal          Discussion with Family: Updated  (significant other) at bedside  Discharge instructions/Information to patient and family:   See after visit summary for information provided to patient and family        Provisions for Follow-Up Care:  See after visit summary for information related to follow-up care and any pertinent home health orders  Disposition:   Home    Planned Readmission: NO     Discharge Statement:  I spent 45 minutes discharging the patient  This time was spent on the day of discharge  I had direct contact with the patient on the day of discharge  Greater than 50% of the total time was spent examining patient, answering all patient questions, arranging and discussing plan of care with patient as well as directly providing post-discharge instructions  Additional time then spent on discharge activities  Discharge Medications:  See after visit summary for reconciled discharge medications provided to patient and/or family        **Please Note: This note may have been constructed using a voice recognition system**

## 2022-08-04 NOTE — PLAN OF CARE
Problem: PAIN - ADULT  Goal: Verbalizes/displays adequate comfort level or baseline comfort level  Description: Interventions:  - Encourage patient to monitor pain and request assistance  - Assess pain using appropriate pain scale  - Administer analgesics based on type and severity of pain and evaluate response  - Implement non-pharmacological measures as appropriate and evaluate response  - Consider cultural and social influences on pain and pain management  - Notify physician/advanced practitioner if interventions unsuccessful or patient reports new pain  Outcome: Progressing     Problem: INFECTION - ADULT  Goal: Absence or prevention of progression during hospitalization  Description: INTERVENTIONS:  - Assess and monitor for signs and symptoms of infection  - Monitor lab/diagnostic results  - Monitor all insertion sites, i e  indwelling lines, tubes, and drains  - Monitor endotracheal if appropriate and nasal secretions for changes in amount and color  - [place] appropriate cooling/warming therapies per order  - Administer medications as ordered  - Instruct and encourage patient and family to use good hand hygiene technique  - Identify and instruct in appropriate isolation precautions for identified infection/condition  Outcome: Progressing  Goal: Absence of fever/infection during neutropenic period  Description: INTERVENTIONS:  - Monitor WBC    Outcome: Progressing     Problem: DISCHARGE PLANNING  Goal: Discharge to home or other facility with appropriate resources  Description: INTERVENTIONS:  - Identify barriers to discharge w/patient and caregiver  - Arrange for needed discharge resources and transportation as appropriate  - Identify discharge learning needs (meds, wound care, etc )  - Arrange for interpretive services to assist at discharge as needed  - Refer to Case Management Department for coordinating discharge planning if the patient needs post-hospital services based on physician/advanced practitioner order or complex needs related to functional status, cognitive ability, or social support system  Outcome: Progressing     Problem: Knowledge Deficit  Goal: Patient/family/caregiver demonstrates understanding of disease process, treatment plan, medications, and discharge instructions  Description: Complete learning assessment and assess knowledge base  Interventions:  - Provide teaching at level of understanding  - Provide teaching via preferred learning methods  Outcome: Progressing     Problem: Nutrition/Hydration-ADULT  Goal: Nutrient/Hydration intake appropriate for improving, restoring or maintaining nutritional needs  Description: Monitor and assess patient's nutrition/hydration status for malnutrition  Collaborate with interdisciplinary team and initiate plan and interventions as ordered  Monitor patient's weight and dietary intake as ordered or per policy  Utilize nutrition screening tool and intervene as necessary  Determine patient's food preferences and provide high-protein, high-caloric foods as appropriate       INTERVENTIONS:  - Monitor oral intake, urinary output, labs, and treatment plans  - Assess nutrition and hydration status and recommend course of action  - Evaluate amount of meals eaten  - Assist patient with eating if necessary   - Allow adequate time for meals  - Recommend/ encourage appropriate diets, oral nutritional supplements, and vitamin/mineral supplements  - Order, calculate, and assess calorie counts as needed  - Recommend, monitor, and adjust tube feedings and TPN/PPN based on assessed needs  - Assess need for intravenous fluids  - Provide specific nutrition/hydration education as appropriate  - Include patient/family/caregiver in decisions related to nutrition  Outcome: Progressing     Problem: METABOLIC, FLUID AND ELECTROLYTES - ADULT  Goal: Electrolytes maintained within normal limits  Description: INTERVENTIONS:  - Monitor labs and assess patient for signs and symptoms of electrolyte imbalances  - Administer electrolyte replacement as ordered  - Monitor response to electrolyte replacements, including repeat lab results as appropriate  - Instruct patient on fluid and nutrition as appropriate  Outcome: Progressing  Goal: Fluid balance maintained  Description: INTERVENTIONS:  - Monitor labs   - Monitor I/O and WT  - Instruct patient on fluid and nutrition as appropriate  - Assess for signs & symptoms of volume excess or deficit  Outcome: Progressing

## 2022-08-04 NOTE — PROGRESS NOTES
Follow up Consultation    Nephrology   Whit Novoa 56 y o  female MRN: [id number]  Unit/Bed#: [facility] 856-18 Encounter: [id number]      Physician Requesting Consult: Vivi Stearns, *        ASSESSMENT/PLAN:  [de-identified] year female multiple comorbidities including ESRD (TTS) admitted with worsening lower back pain  Nephrology following for dialysis management  ESRD:   gets usual dialysis TTS at [address]  access:  Left AV fistula  estimated dry weight:  124 kilos  last dialysis treatment on 08/04/2022  next dialysis treatment on 08/06/2022  check BMP, phosphorus with dialysis  renal diet when allowed diet order  avoid nephrotoxins, adjust meds to appropriate GFR  Stable for discharge from renal standpoint medically cleared    H&H/anemia:  most recent hemoglobin at 8 grams/deciliter  maintain hemoglobin 10-12 grams/deciliter  Recommendations:  No MITCHELL due to history of DVT/PE  Acid-base electrolytes:  Hyperphosphatemia:  most recent phosphorus 6 3  Sodium, phosphorus, potassium, acidosis to be corrected with dialysis    Blood pressure/hypertension:   Current medications: continue Coreg 25 mg p o  B i d  torsemide 100 mg p o  Q day  Recommendations:  continue without change    Bone mineral disease/secondary hyperparathyroidism of renal origin:   Recommendations: continue Sensipar 30 mg p o  Q day, Renagel 2 tabs p o  T i d  With meals  Follow-up intact PTH as an outpatient    Other medical problems:  Acute on chronic back pain:  Management per primary team   Follow-up with Neurosurgery  MRI from 08/01 suggestive of subacute else 4/5 osteomyelitis with lesser degree diskitis  At this time neurosurgery holding off on biopsy and further surgical procedure will re-evaluate as an outpatient  Currently off of IV antibiotics  Thanks for the consult  Will continue to follow  Please call with questions    Above-mentioned orders and Plan in terms of dialysis was discussed with the team in depth via Tiger text    Vilma Richter MD, [facility], 2022, 12:05 PM        Objective :  Patient seen and examined while on hemodialysis at 9:47 a m  Tolerating dialysis well aiming for UF close to 1 7-2 kilos with treatment today hemodynamically stable  PHYSICAL EXAM  /87 (BP Location: Right arm)   Pulse 80   Temp 98 3 °F (36 8 °C) (Oral)   Resp 16   Ht 5' 6" (1 676 m)   Wt 130 kg (286 lb 9 6 oz)   LMP 2016 (Exact Date)   SpO2 93%   BMI 46 26 kg/m²   Temp (24hrs), Av 2 °F (36 8 °C), Min:98 °F (36 7 °C), Max:98 3 °F (36 8 °C)        Intake/Output Summary (Last 24 hours) at 2022 1205  Last data filed at 2022 0805  Gross per 24 hour   Intake 200 ml   Output --   Net 200 ml       I/O last 24 hours: In: 440 [P O :240; I V :200]  Out: -       Current Weight: Weight - Scale: 130 kg (286 lb 9 6 oz)  First Weight: Weight - Scale: 130 kg (286 lb 9 6 oz)  Physical Exam  Vitals and nursing note reviewed  Constitutional:       General: She is not in acute distress  Appearance: Normal appearance  She is obese  She is not ill-appearing, toxic-appearing or diaphoretic  HENT:      Head: Normocephalic and atraumatic  Mouth/Throat:      Mouth: Mucous membranes are moist       Pharynx: Oropharynx is clear  No oropharyngeal exudate  Eyes:      General: No scleral icterus  Conjunctiva/sclera: Conjunctivae normal    Cardiovascular:      Rate and Rhythm: Normal rate  Heart sounds: Normal heart sounds  No friction rub  Pulmonary:      Effort: Pulmonary effort is normal  No respiratory distress  Breath sounds: Normal breath sounds  No stridor  No wheezing  Abdominal:      General: There is no distension  Palpations: Abdomen is soft  There is no mass  Tenderness: There is no abdominal tenderness  Musculoskeletal:         General: Swelling present  Cervical back: Normal range of motion and neck supple  No rigidity        Comments: Trace lower extremity edema left AV fistula   Skin:     General: Skin is warm  Coloration: Skin is not jaundiced  Neurological:      General: No focal deficit present  Mental Status: She is alert and oriented to person, place, and time  Psychiatric:         Mood and Affect: Mood normal          Behavior: Behavior normal              Review of Systems   Constitutional: Negative for chills and fatigue  HENT: Negative for congestion  Respiratory: Negative for cough, shortness of breath and wheezing  Cardiovascular: Negative for leg swelling  Gastrointestinal: Negative for abdominal pain, constipation, diarrhea, nausea and vomiting  Musculoskeletal: Negative for back pain  Skin: Negative for rash  Neurological: Negative for headaches  Psychiatric/Behavioral: Negative for agitation and confusion  All other systems reviewed and are negative        Scheduled Meds:  Current Facility-Administered Medications   Medication Dose Route Frequency Provider Last Rate    albuterol  2 puff Inhalation Q6H PRN Paulette Saint, MD      ALPRAZolam  0 25 mg Oral BID PRN Paulette Saint, MD      atorvastatin  20 mg Oral QPM Paulette Saint, MD      b complex-vitamin C-folic acid  1 capsule Oral Daily With Jadene Kayser, MD      carvedilol  25 mg Oral BID Paulette Saint, MD      cinacalcet  30 mg Oral Daily Paulette Saint, MD      dicyclomine  10 mg Oral 4x Daily (AC & HS) Paulette Saint, MD      HYDROmorphone  0 2 mg Intravenous Q4H PRN Tomas Sanderson PA-C      insulin glargine  9 Units Subcutaneous HS Hardik Winters DO      insulin lispro  1-5 Units Subcutaneous TID AC Paulette Saint, MD      insulin lispro  1-5 Units Subcutaneous HS Paulette Saint, MD      levothyroxine  50 mcg Oral Daily Paulette Saint, MD      lidocaine  1 patch Topical Daily Paulette Saint, MD      loratadine  10 mg Oral Daily Paulette Saint, MD      LORazepam  1 mg Intravenous Once LOIS Mills      melatonin  3 mg Oral HS Eileen Cerda MD      menthol-methyl salicylate   Apply externally 4x Daily PRN Wendi Saleh MD      nystatin   Topical BID Eileen Cerda MD      ondansetron  4 mg Intravenous Q4H PRN Eileen Cerda MD      oxyCODONE-acetaminophen  1 tablet Oral Q6H PRN LOIS Cabrera      Or    oxyCODONE-acetaminophen  2 tablet Oral Q6H PRN LOIS Cabrera      pantoprazole  40 mg Oral Early Morning Eileen Creda MD      polyethylene glycol  17 g Oral Daily Eileen Cerda MD      pregabalin  50 mg Oral Daily Eileen Cerda MD      saliva substitute  5 spray Mouth/Throat 4x Daily PRN Kiersten S RaynaLOIS      senna  2 tablet Oral HS LOIS Cabrera      sertraline  75 mg Oral Daily Eileen Cerda MD      sevelamer  1,600 mg Oral TID With Meals Eileen Cerda MD      torsemide  100 mg Oral Daily Eileen Cerda MD      warfarin  9 mg Oral Daily (warfarin) Eileen Cerda MD         PRN Meds:   albuterol    ALPRAZolam    HYDROmorphone    menthol-methyl salicylate    ondansetron    oxyCODONE-acetaminophen **OR** oxyCODONE-acetaminophen    saliva substitute    Continuous Infusions:       Invasive Devices:    Invasive Devices  Report    Peripheral Intravenous Line  Duration           Peripheral IV 08/03/22 Right Antecubital 1 day          Line  Duration           Hemodialysis AV Fistula 02/20/18 Left Forearm 1625 days                  LABORATORY:    Results from last 7 days   Lab Units 08/04/22  0815 08/02/22  0845 08/01/22  0758 07/31/22  0546 07/30/22  0901 07/29/22  1218   WBC Thousand/uL 6 17 4 77 4 89 4 57 5 16 4 67   HEMOGLOBIN g/dL 8 0* 7 7* 8 1* 7 3* 7 4* 7 5*   HEMATOCRIT % 25 4* 24 2* 25 4* 23 5* 24 2* 23 8*   PLATELETS Thousands/uL 165 121* 140* 122* 112* 114*   POTASSIUM mmol/L 4 1 4 6 5 3 4 7 5 0 5 3   CHLORIDE mmol/L 99 99 101 101 106 105   CO2 mmol/L 27 30 31 34* 30 30   BUN mg/dL 30* 24 39* 30* 53* 47*   CREATININE mg/dL 6 88* 5 70* 7 92* 5 99* 8 21* 6 83*   CALCIUM mg/dL 9 0 9 0 8 5 8 5 8 6 8 6   PHOSPHORUS mg/dL 6 3* 5 3*  --   --   --   --       rest all reviewed    RADIOLOGY:  XR spine lumbar 2 or 3 views injury   Final Result by Raymundo Solo MD (08/03 1015)      New L4-5 anterolisthesis  Possible new L4 pars defect/spondylolysis  Further progression of disc height loss at L4-5 (infected level)  Note: The study was marked in EPIC for immediate notification  Workstation performed: [id number]         MRI lumbar spine w wo contrast   Final Result by Jaden Baptiste MD (08/01 5828)      1  MR findings most suggestive for subacute L4-5 osteomyelitis with lesser degree discitis  Noncontrast images appear grossly similar to recent MRI  No paravertebral or epidural abscess  2   Mild to moderate degenerative canal stenosis at L4-5  Moderate right and mild left foraminal stenosis at this level  Workstation performed: [id number]         CT spine lumbar wo contrast   Final Result by Melva Jimenez MD (07/29 8508)      Vacuum disc phenomenon at L4-L5 with endplate erosive change at this level as well as involving the facet joints at L4-L5 and L5-S1  Findings are stable dating back to prior examinations since 6/28/2022 with overall mild interval progression since    11/10/2021  Imaging findings in this patient with end-stage renal disease are 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 with infection to be considered less likely (given the presence of vacuum disc phenomenon and relative stability over    one month)  Interval follow-up with MRI is again recommended to ensure stability as previously suggested  Stable central disc extrusion is again noted at L4-L5 demonstrating cranial migration and results in overall mild to moderate canal stenosis  Heterogeneous appearance of the visualized osseous structures consistent with renal osteodystrophy        Workstation performed: [id number] Rest all reviewed    Portions of the record may have been created with voice recognition software  Occasional wrong word or "sound a like" substitutions may have occurred due to the inherent limitations of voice recognition software  Read the chart carefully and recognize, using context, where substitutions have occurred  If you have any questions, please contact the dictating provider

## 2022-08-05 ENCOUNTER — TELEPHONE (OUTPATIENT)
Dept: PHYSICAL THERAPY | Facility: OTHER | Age: 57
End: 2022-08-05

## 2022-08-05 NOTE — TELEPHONE ENCOUNTER
Message left for patient regarding referral entered for  [facility]      Contact information, hours of operation and VM instructions left  Nurse requested patient to CB if needed and leave Full Name &  on VM       Referral deferred for f/u attempt per protocol

## 2022-08-06 NOTE — ED ATTENDING ATTESTATION
7/9/2022  ISalvador DO, saw and evaluated the patient  I have discussed the patient with the resident/non-physician practitioner and agree with the resident's/non-physician practitioner's findings, Plan of Care, and MDM as documented in the resident's/non-physician practitioner's note, except where noted  All available labs and Radiology studies were reviewed  I was present for key portions of any procedure(s) performed by the resident/non-physician practitioner and I was immediately available to provide assistance  At this point I agree with the current assessment done in the Emergency Department  I have conducted an independent evaluation of this patient a history and physical is as follows:    ED Course     Initially seen in ED, found to be headstrike on warfarin  Upgraded to trauma alert

## 2022-08-08 ENCOUNTER — TELEPHONE (OUTPATIENT)
Dept: PAIN MEDICINE | Facility: CLINIC | Age: 57
End: 2022-08-08

## 2022-08-08 ENCOUNTER — TELEPHONE (OUTPATIENT)
Dept: BEHAVIORAL/MENTAL HEALTH CLINIC | Facility: CLINIC | Age: 57
End: 2022-08-08

## 2022-08-08 NOTE — TELEPHONE ENCOUNTER
Pt called and asked to speak with [facility], I let her know that she was with a patient    Pt then asked if she was on the schedule today, I let her know that she was scheduled with [facility] at [address] [place] [address]    Pt let me know that she just got out of the hospital    Pt states that she struggles to get up in the morning, she has to stand on her bed for a while    Pt would like for [facility] to call her back

## 2022-08-08 NOTE — TELEPHONE ENCOUNTER
Spoke with pt as she did not present for eval scheduled earlier this AM   She indicates she is not feeling well, she has an infection  She does not believe she is a candidate for the SCS implant at this time  Pt indicates she has a wound that is pussy and bleeding  Informed pt to contact her pcp asap  Pt agreed to contact pcp upon termination of this phone call

## 2022-08-10 ENCOUNTER — TELEPHONE (OUTPATIENT)
Dept: INFECTIOUS DISEASES | Facility: CLINIC | Age: 57
End: 2022-08-10

## 2022-08-10 NOTE — TELEPHONE ENCOUNTER
Patient no showed to her EMG and also no showed to her psych eval for her SCS - would advise she move forward with these options

## 2022-08-10 NOTE — TELEPHONE ENCOUNTER
S/w pt  Pt is very upset and shocked that her UDS is positive  Pt states that she used a friends CBD cream for 2-3 days for her pain and it did not help  Pt states that she is still in severe pain and cannot get OOB  Advised will  notify [facility]  Pt has OVS 8/29

## 2022-08-10 NOTE — TELEPHONE ENCOUNTER
Called and spoke with pt regarding her appt  She was recently seen in the hospital by Dr Sandra Hernandez  It was discussed while pt was inpt that issues with back were more degenerative related and there was no infection present warranting appt with ID  She should follow up w/neurosurgery and PCP  Pt understood and states was told by PCP to follow up with [facility] for her hand, which see will see tomorrow       Appt was cancelled

## 2022-08-10 NOTE — TELEPHONE ENCOUNTER
S/w pt  Advised of same  Pt will call central scheduling to re schedule EMG  Pt advised that RN will send message to Roger Brittle to re schedule psych eval  Pt verbalized understanding and appreciation  Roger Brittle- can pt be contacted to re schedule eval? Thank you

## 2022-08-12 ENCOUNTER — HOSPITAL ENCOUNTER (INPATIENT)
Facility: HOSPITAL | Age: 57
LOS: 8 days | Discharge: HOME HEALTH | DRG: 539 | End: 2022-08-21
Attending: EMERGENCY MEDICINE | Admitting: FAMILY MEDICINE
Payer: MEDICARE

## 2022-08-12 ENCOUNTER — APPOINTMENT (EMERGENCY)
Dept: RADIOLOGY | Facility: HOSPITAL | Age: 57
DRG: 539 | End: 2022-08-12
Payer: MEDICARE

## 2022-08-12 DIAGNOSIS — N18.6 ESRD (END STAGE RENAL DISEASE) (HCC): ICD-10-CM

## 2022-08-12 DIAGNOSIS — M54.9 INTRACTABLE BACK PAIN: ICD-10-CM

## 2022-08-12 DIAGNOSIS — R11.2 NAUSEA AND VOMITING, UNSPECIFIED VOMITING TYPE: ICD-10-CM

## 2022-08-12 DIAGNOSIS — F41.9 ANXIETY DISORDER: ICD-10-CM

## 2022-08-12 DIAGNOSIS — R52 INTRACTABLE PAIN: Primary | ICD-10-CM

## 2022-08-12 DIAGNOSIS — K59.00 CONSTIPATION, UNSPECIFIED CONSTIPATION TYPE: ICD-10-CM

## 2022-08-12 LAB
ANION GAP SERPL CALCULATED.3IONS-SCNC: 7 MMOL/L (ref 4–13)
BASOPHILS # BLD AUTO: 0.04 THOUSANDS/ΜL (ref 0–0.1)
BASOPHILS NFR BLD AUTO: 1 % (ref 0–1)
BUN SERPL-MCNC: 33 MG/DL (ref 5–25)
CALCIUM SERPL-MCNC: 6.8 MG/DL (ref 8.3–10.1)
CHLORIDE SERPL-SCNC: 99 MMOL/L (ref 96–108)
CO2 SERPL-SCNC: 29 MMOL/L (ref 21–32)
CREAT SERPL-MCNC: 7.06 MG/DL (ref 0.6–1.3)
EOSINOPHIL # BLD AUTO: 0.1 THOUSAND/ΜL (ref 0–0.61)
EOSINOPHIL NFR BLD AUTO: 1 % (ref 0–6)
ERYTHROCYTE [DISTWIDTH] IN BLOOD BY AUTOMATED COUNT: 14.9 % (ref 11.6–15.1)
GFR SERPL CREATININE-BSD FRML MDRD: 5 ML/MIN/1.73SQ M
GLUCOSE SERPL-MCNC: 102 MG/DL (ref 65–140)
GLUCOSE SERPL-MCNC: 94 MG/DL (ref 65–140)
HCT VFR BLD AUTO: 25.6 % (ref 34.8–46.1)
HGB BLD-MCNC: 8.1 G/DL (ref 11.5–15.4)
IMM GRANULOCYTES # BLD AUTO: 0.04 THOUSAND/UL (ref 0–0.2)
IMM GRANULOCYTES NFR BLD AUTO: 1 % (ref 0–2)
INR PPP: 1.15 (ref 0.84–1.19)
LYMPHOCYTES # BLD AUTO: 1.12 THOUSANDS/ΜL (ref 0.6–4.47)
LYMPHOCYTES NFR BLD AUTO: 14 % (ref 14–44)
MCH RBC QN AUTO: 31.3 PG (ref 26.8–34.3)
MCHC RBC AUTO-ENTMCNC: 31.6 G/DL (ref 31.4–37.4)
MCV RBC AUTO: 99 FL (ref 82–98)
MONOCYTES # BLD AUTO: 0.76 THOUSAND/ΜL (ref 0.17–1.22)
MONOCYTES NFR BLD AUTO: 9 % (ref 4–12)
NEUTROPHILS # BLD AUTO: 6.15 THOUSANDS/ΜL (ref 1.85–7.62)
NEUTS SEG NFR BLD AUTO: 74 % (ref 43–75)
NRBC BLD AUTO-RTO: 0 /100 WBCS
PLATELET # BLD AUTO: 206 THOUSANDS/UL (ref 149–390)
PMV BLD AUTO: 10.2 FL (ref 8.9–12.7)
POTASSIUM SERPL-SCNC: 5.3 MMOL/L (ref 3.5–5.3)
PROTHROMBIN TIME: 14.9 SECONDS (ref 11.6–14.5)
PROTHROMBIN TIME: >120 SECONDS (ref 11.6–14.5)
RBC # BLD AUTO: 2.59 MILLION/UL (ref 3.81–5.12)
SODIUM SERPL-SCNC: 135 MMOL/L (ref 135–147)
WBC # BLD AUTO: 8.21 THOUSAND/UL (ref 4.31–10.16)

## 2022-08-12 PROCEDURE — G1004 CDSM NDSC: HCPCS

## 2022-08-12 PROCEDURE — 80048 BASIC METABOLIC PNL TOTAL CA: CPT

## 2022-08-12 PROCEDURE — 36415 COLL VENOUS BLD VENIPUNCTURE: CPT

## 2022-08-12 PROCEDURE — 85025 COMPLETE CBC W/AUTO DIFF WBC: CPT

## 2022-08-12 PROCEDURE — 99220 PR INITIAL OBSERVATION CARE/DAY 70 MINUTES: CPT | Performed by: FAMILY MEDICINE

## 2022-08-12 PROCEDURE — 82948 REAGENT STRIP/BLOOD GLUCOSE: CPT

## 2022-08-12 PROCEDURE — 73700 CT LOWER EXTREMITY W/O DYE: CPT

## 2022-08-12 PROCEDURE — 99285 EMERGENCY DEPT VISIT HI MDM: CPT

## 2022-08-12 PROCEDURE — 85610 PROTHROMBIN TIME: CPT

## 2022-08-12 PROCEDURE — 99285 EMERGENCY DEPT VISIT HI MDM: CPT | Performed by: EMERGENCY MEDICINE

## 2022-08-12 RX ORDER — INSULIN GLARGINE 100 [IU]/ML
12 INJECTION, SOLUTION SUBCUTANEOUS
Status: DISCONTINUED | OUTPATIENT
Start: 2022-08-12 | End: 2022-08-12

## 2022-08-12 RX ORDER — ACETAMINOPHEN 325 MG/1
650 TABLET ORAL ONCE
Status: COMPLETED | OUTPATIENT
Start: 2022-08-12 | End: 2022-08-12

## 2022-08-12 RX ORDER — INSULIN LISPRO 100 [IU]/ML
1-5 INJECTION, SOLUTION INTRAVENOUS; SUBCUTANEOUS
Status: DISCONTINUED | OUTPATIENT
Start: 2022-08-12 | End: 2022-08-21 | Stop reason: HOSPADM

## 2022-08-12 RX ORDER — SENNOSIDES 8.6 MG
2 TABLET ORAL
Status: DISCONTINUED | OUTPATIENT
Start: 2022-08-12 | End: 2022-08-21 | Stop reason: HOSPADM

## 2022-08-12 RX ORDER — CINACALCET 30 MG/1
30 TABLET, FILM COATED ORAL DAILY
Status: DISCONTINUED | OUTPATIENT
Start: 2022-08-13 | End: 2022-08-13

## 2022-08-12 RX ORDER — OXYCODONE HYDROCHLORIDE AND ACETAMINOPHEN 5; 325 MG/1; MG/1
1 TABLET ORAL EVERY 6 HOURS PRN
Status: DISPENSED | OUTPATIENT
Start: 2022-08-12 | End: 2022-08-14

## 2022-08-12 RX ORDER — SENNOSIDES 8.6 MG
1 TABLET ORAL DAILY
Status: DISCONTINUED | OUTPATIENT
Start: 2022-08-13 | End: 2022-08-21 | Stop reason: HOSPADM

## 2022-08-12 RX ORDER — DOXYCYCLINE HYCLATE 100 MG/1
100 CAPSULE ORAL EVERY 12 HOURS SCHEDULED
Status: DISPENSED | OUTPATIENT
Start: 2022-08-12 | End: 2022-08-14

## 2022-08-12 RX ORDER — LANOLIN ALCOHOL/MO/W.PET/CERES
3 CREAM (GRAM) TOPICAL
Status: DISCONTINUED | OUTPATIENT
Start: 2022-08-12 | End: 2022-08-21 | Stop reason: HOSPADM

## 2022-08-12 RX ORDER — CHOLECALCIFEROL (VITAMIN D3) 10 MCG
1 TABLET ORAL
Refills: 3 | Status: DISCONTINUED | OUTPATIENT
Start: 2022-08-12 | End: 2022-08-21 | Stop reason: HOSPADM

## 2022-08-12 RX ORDER — POLYETHYLENE GLYCOL 3350 17 G/17G
17 POWDER, FOR SOLUTION ORAL DAILY
Status: DISCONTINUED | OUTPATIENT
Start: 2022-08-13 | End: 2022-08-21 | Stop reason: HOSPADM

## 2022-08-12 RX ORDER — INSULIN LISPRO 100 [IU]/ML
3 INJECTION, SOLUTION INTRAVENOUS; SUBCUTANEOUS
Status: DISCONTINUED | OUTPATIENT
Start: 2022-08-13 | End: 2022-08-21 | Stop reason: HOSPADM

## 2022-08-12 RX ORDER — TORSEMIDE 20 MG/1
100 TABLET ORAL DAILY
Status: DISCONTINUED | OUTPATIENT
Start: 2022-08-13 | End: 2022-08-13

## 2022-08-12 RX ORDER — INSULIN LISPRO 100 [IU]/ML
1-5 INJECTION, SOLUTION INTRAVENOUS; SUBCUTANEOUS
Status: DISCONTINUED | OUTPATIENT
Start: 2022-08-13 | End: 2022-08-21 | Stop reason: HOSPADM

## 2022-08-12 RX ORDER — ALPRAZOLAM 0.25 MG/1
0.25 TABLET ORAL 2 TIMES DAILY PRN
Status: DISCONTINUED | OUTPATIENT
Start: 2022-08-12 | End: 2022-08-21 | Stop reason: HOSPADM

## 2022-08-12 RX ORDER — LIDOCAINE 50 MG/G
1 PATCH TOPICAL DAILY
Status: DISCONTINUED | OUTPATIENT
Start: 2022-08-13 | End: 2022-08-21 | Stop reason: HOSPADM

## 2022-08-12 RX ORDER — WARFARIN SODIUM 5 MG/1
10 TABLET ORAL
Status: COMPLETED | OUTPATIENT
Start: 2022-08-12 | End: 2022-08-12

## 2022-08-12 RX ORDER — LORATADINE 10 MG/1
10 TABLET ORAL DAILY
Status: DISCONTINUED | OUTPATIENT
Start: 2022-08-13 | End: 2022-08-21 | Stop reason: HOSPADM

## 2022-08-12 RX ORDER — ONDANSETRON 2 MG/ML
4 INJECTION INTRAMUSCULAR; INTRAVENOUS ONCE
Status: COMPLETED | OUTPATIENT
Start: 2022-08-12 | End: 2022-08-12

## 2022-08-12 RX ORDER — PREGABALIN 50 MG/1
50 CAPSULE ORAL DAILY
Status: DISCONTINUED | OUTPATIENT
Start: 2022-08-13 | End: 2022-08-21 | Stop reason: HOSPADM

## 2022-08-12 RX ORDER — LEVOTHYROXINE SODIUM 0.05 MG/1
50 TABLET ORAL
Status: DISCONTINUED | OUTPATIENT
Start: 2022-08-13 | End: 2022-08-21 | Stop reason: HOSPADM

## 2022-08-12 RX ORDER — SEVELAMER HYDROCHLORIDE 800 MG/1
1600 TABLET, FILM COATED ORAL
Status: DISCONTINUED | OUTPATIENT
Start: 2022-08-13 | End: 2022-08-21 | Stop reason: HOSPADM

## 2022-08-12 RX ORDER — HYDROMORPHONE HYDROCHLORIDE 2 MG/1
1 TABLET ORAL ONCE
Status: COMPLETED | OUTPATIENT
Start: 2022-08-12 | End: 2022-08-12

## 2022-08-12 RX ORDER — ONDANSETRON 2 MG/ML
4 INJECTION INTRAMUSCULAR; INTRAVENOUS EVERY 6 HOURS PRN
Status: DISCONTINUED | OUTPATIENT
Start: 2022-08-12 | End: 2022-08-14

## 2022-08-12 RX ORDER — CARVEDILOL 25 MG/1
25 TABLET ORAL 2 TIMES DAILY
Status: DISCONTINUED | OUTPATIENT
Start: 2022-08-12 | End: 2022-08-15

## 2022-08-12 RX ORDER — NIFEDIPINE 30 MG/1
30 TABLET, EXTENDED RELEASE ORAL DAILY
Status: DISCONTINUED | OUTPATIENT
Start: 2022-08-13 | End: 2022-08-15

## 2022-08-12 RX ORDER — PANTOPRAZOLE SODIUM 40 MG/1
40 TABLET, DELAYED RELEASE ORAL
Status: DISCONTINUED | OUTPATIENT
Start: 2022-08-13 | End: 2022-08-21 | Stop reason: HOSPADM

## 2022-08-12 RX ORDER — INSULIN GLARGINE 100 [IU]/ML
8 INJECTION, SOLUTION SUBCUTANEOUS
Status: DISCONTINUED | OUTPATIENT
Start: 2022-08-12 | End: 2022-08-20

## 2022-08-12 RX ORDER — NYSTATIN 100000 [USP'U]/G
POWDER TOPICAL 2 TIMES DAILY
Status: DISCONTINUED | OUTPATIENT
Start: 2022-08-12 | End: 2022-08-21 | Stop reason: HOSPADM

## 2022-08-12 RX ORDER — HYDROMORPHONE HCL/PF 1 MG/ML
0.5 SYRINGE (ML) INJECTION
Status: DISCONTINUED | OUTPATIENT
Start: 2022-08-12 | End: 2022-08-13

## 2022-08-12 RX ORDER — ACETAMINOPHEN 325 MG/1
650 TABLET ORAL EVERY 6 HOURS PRN
Status: DISCONTINUED | OUTPATIENT
Start: 2022-08-12 | End: 2022-08-16

## 2022-08-12 RX ORDER — OXYCODONE HYDROCHLORIDE AND ACETAMINOPHEN 5; 325 MG/1; MG/1
1 TABLET ORAL ONCE
Status: COMPLETED | OUTPATIENT
Start: 2022-08-12 | End: 2022-08-12

## 2022-08-12 RX ORDER — ALBUTEROL SULFATE 90 UG/1
2 AEROSOL, METERED RESPIRATORY (INHALATION) EVERY 6 HOURS PRN
Status: DISCONTINUED | OUTPATIENT
Start: 2022-08-12 | End: 2022-08-21 | Stop reason: HOSPADM

## 2022-08-12 RX ORDER — ATORVASTATIN CALCIUM 20 MG/1
20 TABLET, FILM COATED ORAL
Status: DISCONTINUED | OUTPATIENT
Start: 2022-08-12 | End: 2022-08-21 | Stop reason: HOSPADM

## 2022-08-12 RX ORDER — DICYCLOMINE HYDROCHLORIDE 10 MG/1
10 CAPSULE ORAL 3 TIMES DAILY PRN
Status: DISCONTINUED | OUTPATIENT
Start: 2022-08-12 | End: 2022-08-21 | Stop reason: HOSPADM

## 2022-08-12 RX ADMIN — OXYCODONE HYDROCHLORIDE AND ACETAMINOPHEN 1 TABLET: 5; 325 TABLET ORAL at 17:27

## 2022-08-12 RX ADMIN — OXYCODONE HYDROCHLORIDE AND ACETAMINOPHEN 1 TABLET: 5; 325 TABLET ORAL at 20:22

## 2022-08-12 RX ADMIN — ACETAMINOPHEN 650 MG: 325 TABLET ORAL at 13:43

## 2022-08-12 RX ADMIN — ONDANSETRON HYDROCHLORIDE 4 MG: 2 INJECTION, SOLUTION INTRAMUSCULAR; INTRAVENOUS at 18:27

## 2022-08-12 RX ADMIN — MELATONIN 3 MG: at 22:29

## 2022-08-12 RX ADMIN — DOXYCYCLINE 100 MG: 100 CAPSULE ORAL at 22:28

## 2022-08-12 RX ADMIN — CARVEDILOL 25 MG: 25 TABLET, FILM COATED ORAL at 22:29

## 2022-08-12 RX ADMIN — NEPHROCAP 1 CAPSULE: 1 CAP ORAL at 22:28

## 2022-08-12 RX ADMIN — ATORVASTATIN CALCIUM 20 MG: 20 TABLET, FILM COATED ORAL at 22:29

## 2022-08-12 RX ADMIN — WARFARIN SODIUM 10 MG: 5 TABLET ORAL at 22:28

## 2022-08-12 RX ADMIN — HYDROMORPHONE HYDROCHLORIDE 1 MG: 2 TABLET ORAL at 15:16

## 2022-08-12 NOTE — ED PROVIDER NOTES
History  Chief Complaint   Patient presents with    Hip Pain     Pt report fell onright hip 2 days PTA -headstrike -LOC +thinners    Wound Infection     Pt dreports infction in right hand from IV sent home with antibiotics pain increasing  61-year-old female with past medical history of diabetes, end-stage kidney disease on hemodialysis, DVT and PE on warfarin presents to the ED for evaluation of right hip pain and wound infection  States she fell when trying to get out of bed yesterday  She landed on her right hip  Pain worsens with movement  Limited ambulation 2/2 pain  Denies headstrike  Denies LOC  Denies neck pain  States she has been taking Percocet for pain, last taken yesterday with some pain relief  Per chart review, she was recently hospitalized here and discharged on 8/4  She was diagnosed with right hand thrombophlebitis 2/2 a PIV and put on a 7 day course of doxycycline, finished about 2 days ago  Does not think the antibiotic is working because her hand is still swollen  Denies fever, chills, chest pain, shortness of breath, abdominal pain  Endorses some nausea and dry-heaving this morning  No nausea currently  Gets HD every Tuesdays and Thursdays, did not miss HD this week  Did miss warfarin dose yesterday because she fell asleep, has not taken today's dose  History provided by:  Patient   used: No        Prior to Admission Medications   Prescriptions Last Dose Informant Patient Reported? Taking? ALPRAZolam (XANAX) 0 25 mg tablet  Self Yes No   Sig: Take 0 25 mg by mouth 2 (two) times a day as needed for anxiety   ALPRAZolam (XANAX) 0 5 mg tablet   Yes No   Sig: TAKE 2 AND 1/2 TABLETS DAILY IN DIVIDED DOSES AS DIRECTED     Accu-Chek Guide test strip   Yes No   Sig: use to TEST BLOOD SUGAR two to three times a day   Accu-Chek Softclix Lancets lancets  Self Yes No   Sig: 3 (three) times a day Use to test blood sugar   B Complex-C-Folic Acid (Natalia-Denys) TABS   No No Sig: TAKE ONE TABLET BY MOUTH DAILY   KIONEX 15 GM/60ML suspension  Self Yes No   Sig: Take by mouth Takes as a shake on dialysis days Tues-Thurs_Sat    NIFEdipine (ADALAT CC) 30 MG 24 hr tablet   No No   Sig: Take 1 tablet (30 mg total) by mouth daily   NOVOLOG FLEXPEN 100 units/mL SOPN  Self Yes No   Sig: INJECT 1 TO 5 UNITS SUBCUTANEOUSLY THREE TIMES A DAY BEFORE MELAS AS PER SLIDING SCALE   Sevelamer Carbonate 2 4 g PACK   Yes No   Sig: STIR 1 PACKET INTO FOOD 3 TIMES A DAY WITH MEALS   albuterol (ProAir HFA) 90 mcg/act inhaler   No No   Sig: Inhale 2 puffs every 6 (six) hours as needed for wheezing or shortness of breath   ammonium lactate (LAC-HYDRIN) 12 % cream   No No   Sig: Apply topically 2 (two) times a day   atorvastatin (LIPITOR) 20 mg tablet  Self Yes No   Sig: Take 20 mg by mouth every evening    carvedilol (COREG) 25 mg tablet   No No   Sig: TAKE ONE TABLET BY MOUTH TWICE A DAY   cinacalcet (SENSIPAR) 30 mg tablet  Self Yes No   Sig: Take 30 mg by mouth daily   dicyclomine (BENTYL) 10 mg capsule   Yes No   Sig: TAKE 1 TABLET BY MOUTH EVERY 6 HOURS OR AS NEEDED FOR PAIN   doxycycline hyclate (VIBRAMYCIN) 100 mg capsule   No No   Sig: Take 1 capsule (100 mg total) by mouth every 12 (twelve) hours for 7 days   insulin glargine (LANTUS) 100 units/mL subcutaneous injection   No No   Sig: Inject 12 Units under the skin daily at bedtime   levothyroxine 50 mcg tablet  Self Yes No   Sig: Take 50 mcg by mouth daily   lidocaine (LIDODERM) 5 %   Yes No   lidocaine (LIDODERM) 5 %   No No   Sig: Apply 1 patch topically daily Remove & Discard patch within 12 hours or as directed by MD   loratadine (CLARITIN) 10 mg tablet  Self Yes No   Sig: Take 10 mg by mouth daily   melatonin 3 mg   No No   Sig: Take 1 tablet (3 mg total) by mouth daily at bedtime   nystatin (MYCOSTATIN) powder  Self No No   Sig: Apply topically 2 (two) times a day   ondansetron (ZOFRAN-ODT) 8 mg disintegrating tablet   Yes No oxyCODONE-acetaminophen (PERCOCET) 5-325 mg per tablet   No No   Sig: Take 1 tablet by mouth every 6 (six) hours as needed for moderate pain for up to 10 days Max Daily Amount: 4 tablets   pantoprazole (PROTONIX) 40 mg tablet   Yes No   Sig: take 1 tablet by mouth twice a day   pregabalin (LYRICA) 50 mg capsule   No No   Sig: Take 1 PO daily, take 1 PO additionally directly have HD on HD days   senna (SENOKOT) 8 6 mg  Self No No   Sig: Take 2 tablets (17 2 mg total) by mouth daily at bedtime as needed for constipation   sertraline (ZOLOFT) 50 mg tablet  Self No No   Sig: Take 1 tablet (50 mg total) by mouth daily   Patient taking differently: Take 75 mg by mouth in the morning     sevelamer (RENAGEL) 800 mg tablet   No No   Sig: Take 2 tablets (1,600 mg total) by mouth 3 (three) times a day with meals   torsemide (DEMADEX) 100 mg tablet   No No   Sig: Take 1 tablet (100 mg total) by mouth 4 (four) times a week On Sunday, Monday, Wednesday, Friday   Patient taking differently: Take 100 mg by mouth daily On Sunday, Monday, Wednesday, Friday (pt states she takes it everyday)   urea (CARMOL) 40 %   No No   Sig: APPLY TO AFFECTED AREA TOPICALLY EVERY DAY   warfarin (COUMADIN) 3 mg tablet  Self No No   Sig: Take 3 tablets (9 mg total) by mouth daily Takes 9 mg Monday Sat      Facility-Administered Medications: None       Past Medical History:   Diagnosis Date    Abnormal uterine bleeding (AUB)     Anemia     Anxiety     Arthritis     Chronic kidney disease     Claustrophobia     Diabetes mellitus [facility]     Disease of thyroid gland     DVT (deep venous thrombosis) (HCC)     End stage kidney disease ([facility]     Foot ulcer due to secondary DM ([facility] )     [facility] patient 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     Reflux esophagitis     Thrombophlebitis of saphenous vein     Warfarin anticoagulation        Past Surgical History:   Procedure Laterality Date    AMPUTATION      r 4th toe    ARTERIOVENOUS GRAFT PLACEMENT      CATARACT EXTRACTION Bilateral     CERVICAL BIOPSY  W/ LOOP ELECTRODE EXCISION       SECTION      DIALYSIS FISTULA CREATION      DILATION AND CURETTAGE OF UTERUS      ENDOMETRIAL ABLATION W/ NOVASURE      EYE SURGERY      HYSTERECTOMY      @ age 57 (complete)    IR AV FISTULAGRAM/GRAFTOGRAM  10/11/2019    IR AV FISTULAGRAM/GRAFTOGRAM  2020    IR AV FISTULAGRAM/GRAFTOGRAM  2020    IR NON-TUNNELED CENTRAL LINE PLACEMENT  2021    IR NON-TUNNELED CENTRAL LINE PLACEMENT  10/4/2021    OOPHORECTOMY Bilateral     @ age 57    [facility] Right 2021    Procedure: AMPUTATION TRANSMETATARSAL (TMA);  Surgeon: Chavo Foster DPM;  [facility];  Service: Podiatry    LA AMPUTATION TOE,MT-P JT Right 2020    Procedure: AMPUTATION TOE- 5th;  Surgeon: Chavo Foster DPM;  [facility];  Service: Podiatry    LA COLONOSCOPY FLX DX W/LANDON Castillo 1978 PFRMD N/A 3/13/2019    Procedure: COLONOSCOPY;  Surgeon: Veda Sanchez MD;  Location: [facility]; Service: Gastroenterology    LA ESOPHAGOGASTRODUODENOSCOPY TRANSORAL DIAGNOSTIC N/A 3/13/2019    Procedure: ESOPHAGOGASTRODUODENOSCOPY (EGD); Surgeon: Veda Sanchez MD;  Location: [facility];   Service: Gastroenterology    LA LAPAROSCOPY W TOT HYSTERECT UTERUS 250 GRAM OR LESS N/A 2016    Procedure: HYSTERECTOMY LAPAROSCOPIC TOTAL [facility]), with bilateral salpingectomy;  Surgeon: Manuel Casiano DO;  [facility];  Service: Gynecology    THROMBECTOMY / ARTERIOVENOUS GRAFT REVISION      TOE SURGERY Right     removal of the 4th toe    WOUND DEBRIDEMENT Right 10/8/2021    Procedure: R 1st MTPJ washout ;  Surgeon: Abeba Tinoco DPM;  [facility]; Service: Podiatry       Family History   Problem Relation Age of Onset    Heart disease Family     Diabetes Family     Heart disease Mother     Cancer Brother         neck    Throat cancer Brother     Ovarian cancer Maternal Aunt 36     I have reviewed and agree with the history as documented  E-Cigarette/Vaping    E-Cigarette Use Never User      E-Cigarette/Vaping Substances    Nicotine No     THC No     CBD No     Flavoring No     Other No     Unknown No      Social History     Tobacco Use    Smoking status: Never Smoker    Smokeless tobacco: Never Used   Vaping Use    Vaping Use: Never used   Substance Use Topics    Alcohol use: Never     Alcohol/week: 0 0 standard drinks    Drug use: No        Review of Systems   Constitutional: Negative for chills and fever  HENT: Negative for congestion, rhinorrhea and sore throat  Respiratory: Negative for cough and shortness of breath  Cardiovascular: Negative for chest pain  Gastrointestinal: Negative for abdominal pain, diarrhea, nausea and vomiting  Genitourinary: Negative for dysuria and hematuria  Musculoskeletal: Negative for neck pain  Right hip pain    Skin: Positive for wound  Neurological: Negative for dizziness, light-headedness, numbness and headaches  All other systems reviewed and are negative  Physical Exam  ED Triage Vitals [08/12/22 1147]   Temperature Pulse Respirations Blood Pressure SpO2   97 7 °F (36 5 °C) 78 20 132/58 99 %      Temp Source Heart Rate Source Patient Position - Orthostatic VS BP Location FiO2 (%)   Oral Monitor Lying Right arm --      Pain Score       10 - Worst Possible Pain             Orthostatic Vital Signs  Vitals:    08/12/22 1147 08/12/22 1626 08/12/22 1859   BP: 132/58 (!) 217/80 130/87   Pulse: 78 76 74   Patient Position - Orthostatic VS: Lying Lying        Physical Exam  Vitals and nursing note reviewed  Constitutional:       General: She is not in acute distress  Appearance: Normal appearance  She is obese  She is not ill-appearing, toxic-appearing or diaphoretic  HENT:      Head: Normocephalic and atraumatic  Nose: Nose normal    Eyes:      Conjunctiva/sclera: Conjunctivae normal    Neck:      Comments: No midline cervical tenderness  Cardiovascular:      Rate and Rhythm: Normal rate and regular rhythm  Pulses: Normal pulses  Heart sounds: Normal heart sounds  No murmur heard  Pulmonary:      Effort: Pulmonary effort is normal  No respiratory distress  Breath sounds: Normal breath sounds  No wheezing  Chest:      Chest wall: No tenderness  Abdominal:      Palpations: Abdomen is soft  Tenderness: There is no abdominal tenderness  There is no guarding or rebound  Musculoskeletal:      Cervical back: Normal range of motion and neck supple  Comments: No midline thoracic and lumbar tenderness  Tenderness to palpation over right lateral hip  Skin:     General: Skin is warm and dry  Capillary Refill: Capillary refill takes less than 2 seconds  Comments: There is an indurated area that measures 2 5 cm on dorsal aspect of right hand  No fluctuance  It is not erythematous  There is no edema  No increased warmth  Neurological:      General: No focal deficit present  Mental Status: She is alert and oriented to person, place, and time           ED Medications  Medications   atorvastatin (LIPITOR) tablet 20 mg (has no administration in time range)   cinacalcet (SENSIPAR) tablet 30 mg (has no administration in time range)   insulin glargine (LANTUS) subcutaneous injection 12 Units 0 12 mL (has no administration in time range)   levothyroxine tablet 50 mcg (has no administration in time range)   loratadine (CLARITIN) tablet 10 mg (has no administration in time range)   melatonin tablet 3 mg (has no administration in time range)   NIFEdipine ER (ADALAT CC) 24 hr tablet 30 mg (has no administration in time range)   nystatin (MYCOSTATIN) powder (has no administration in time range)   sertraline (ZOLOFT) tablet 75 mg (has no administration in time range)   sevelamer (RENAGEL) tablet 1,600 mg (has no administration in time range)   torsemide (DEMADEX) tablet 100 mg (has no administration in time range)   albuterol (PROVENTIL HFA,VENTOLIN HFA) inhaler 2 puff (has no administration in time range)   ALPRAZolam (XANAX) tablet 0 25 mg (has no administration in time range)   oxyCODONE-acetaminophen (PERCOCET) 5-325 mg per tablet 1 tablet (has no administration in time range)   senna (SENOKOT) tablet 17 2 mg (has no administration in time range)   b complex-vitamin C-folic acid (NEPHROCAPS) capsule 1 capsule (has no administration in time range)   carvedilol (COREG) tablet 25 mg (has no administration in time range)   pantoprazole (PROTONIX) EC tablet 40 mg (has no administration in time range)   pregabalin (LYRICA) capsule 50 mg (has no administration in time range)   acetaminophen (TYLENOL) tablet 650 mg (650 mg Oral Given 8/12/22 1343)   HYDROmorphone (DILAUDID) tablet 1 mg (1 mg Oral Given 8/12/22 1516)   oxyCODONE-acetaminophen (PERCOCET) 5-325 mg per tablet 1 tablet (1 tablet Oral Given 8/12/22 1727)   ondansetron (ZOFRAN) injection 4 mg (4 mg Intravenous Given 8/12/22 1827)       Diagnostic Studies  Results Reviewed     Procedure Component Value Units Date/Time    Protime-INR [[id number]]  (Abnormal) Collected: 08/12/22 1726    Lab Status: Final result Specimen: Blood from Arm, Right Updated: 08/12/22 1752     Protime 14 9 seconds      INR 1 15    Protime-INR [[id number]]  (Abnormal) Collected: 08/12/22 1515    Lab Status: Final result Specimen: Blood from Arm, Right Updated: 08/12/22 1631     Protime >120 0 seconds      INR --    Basic metabolic panel [[id number]]  (Abnormal) Collected: 08/12/22 1515    Lab Status: Final result Specimen: Blood from Arm, Right Updated: 08/12/22 1543     Sodium 135 mmol/L      Potassium 5 3 mmol/L Chloride 99 mmol/L      CO2 29 mmol/L      ANION GAP 7 mmol/L      BUN 33 mg/dL      Creatinine 7 06 mg/dL      Glucose 102 mg/dL      Calcium 6 8 mg/dL      eGFR 5 ml/min/1 73sq m     Narrative:      National Kidney Disease Foundation guidelines for Chronic Kidney Disease (CKD):     Stage 1 with normal or high GFR (GFR > 90 mL/min/1 73 square meters)    Stage 2 Mild CKD (GFR = 60-89 mL/min/1 73 square meters)    Stage 3A Moderate CKD (GFR = 45-59 mL/min/1 73 square meters)    Stage 3B Moderate CKD (GFR = 30-44 mL/min/1 73 square meters)    Stage 4 Severe CKD (GFR = 15-29 mL/min/1 73 square meters)    Stage 5 End Stage CKD (GFR <15 mL/min/1 73 square meters)  Note: GFR calculation is accurate only with a steady state creatinine    CBC and differential [[id number]]  (Abnormal) Collected: 08/12/22 1515    Lab Status: Final result Specimen: Blood from Arm, Right Updated: 08/12/22 1540     WBC 8 21 Thousand/uL      RBC 2 59 Million/uL      Hemoglobin 8 1 g/dL      Hematocrit 25 6 %      MCV 99 fL      MCH 31 3 pg      MCHC 31 6 g/dL      RDW 14 9 %      MPV 10 2 fL      Platelets 575 Thousands/uL      nRBC 0 /100 WBCs      Neutrophils Relative 74 %      Immat GRANS % 1 %      Lymphocytes Relative 14 %      Monocytes Relative 9 %      Eosinophils Relative 1 %      Basophils Relative 1 %      Neutrophils Absolute 6 15 Thousands/µL      Immature Grans Absolute 0 04 Thousand/uL      Lymphocytes Absolute 1 12 Thousands/µL      Monocytes Absolute 0 76 Thousand/µL      Eosinophils Absolute 0 10 Thousand/µL      Basophils Absolute 0 04 Thousands/µL                  CT lower extremity wo contrast right   Final Result by Umm Moss MD (08/12 1412)   1  No acute osseous abnormality  2   Mild degenerative changes the right hip and lumbar spine  3   Mild circumferential bladder wall thickening may represent cystitis versus underdistention  Clinical and laboratory correlation are recommended        Workstation performed: [id number]               Procedures  Procedures      ED Course  ED Course as of 08/12/22 1917   Fri Aug 12, 2022   1223 Blood Pressure: 132/58   1223 Temperature: 97 7 °F (36 5 °C)   1223 Temp Source: Oral   1223 Pulse: 78   1223 Respirations: 20   1223 SpO2: 99 %   1418 CT right hip showed mild degenerative changes  1419 No pain relief after tylenol  Updated patient on CT results  Unwilling to try to ambulate due to pain  1520 Ultrasound guided IV placed  Labs obtained  1548 Hemoglobin(!): 8 1  Stable from hgb of 8 0 eight days ago  1552 Pain improved somewhat after Dilaudid  1722 PROTIME(!): >120 0  PT/INR reordered  1723 Percocet ordered  1752 No pain relief after Percocet  Unable to ambulate  Admitted to [address] [place] [address] for intractable pain and ambulatory dysfunction  1752 POCT INR: 1 15   1753 PROTIME(!): 14 9                                       [facility]  Number of Diagnoses or Management Options  Intractable pain  Diagnosis management comments: 65-year-old female with past medical history of diabetes, end-stage kidney disease on hemodialysis, DVT and PE on warfarin presents with right hip pain and concern for wound infection  Vitals stable  Afebrile  She appears in no acute distress  Exam significant for right lateral hip pain  Wound on right hand does not appear infected  There is an indurated area that measures 2 5 cm on dorsal aspect of right hand, no erythema, no increased warmth, no tenderness to palpation  CBC and BMP within normal limits  INR 1 15  CT of right hip showed mild degenerative changes to the right hip and lumbar spine  Pain uncontrolled after Tylenol, Dilaudid, Percocet  Unable to ambulate  Admitted to [address] [place] [address] for ambulatory dysfunction and intractable pain         Disposition  Final diagnoses:   Intractable pain     Time reflects when diagnosis was documented in both MDM as applicable and the Disposition within this note     Time User Action Codes Description Comment    8/12/2022  5:52 PM Holli FARAH Add [R52] Intractable pain       ED Disposition     ED Disposition   Admit    Condition   Stable    Date/Time   Fri Aug 12, 2022  5:52 PM    Comment   Case was discussed with Dr Jhoan Tineo and the patient's admission status was agreed to be Admission Status: observation status to the service of Dr Jhoan Tineo   Follow-up Information    None         Current Discharge Medication List      CONTINUE these medications which have NOT CHANGED    Details   Accu-Chek Guide test strip use to TEST BLOOD SUGAR two to three times a day      Accu-Chek Softclix Lancets lancets 3 (three) times a day Use to test blood sugar      albuterol (ProAir HFA) 90 mcg/act inhaler Inhale 2 puffs every 6 (six) hours as needed for wheezing or shortness of breath  Qty: 8 5 g, Refills: 0    Comments: Substitution to a formulary equivalent within the same pharmaceutical class is authorized    Associated Diagnoses: Flu-like symptoms      !! ALPRAZolam (XANAX) 0 25 mg tablet Take 0 25 mg by mouth 2 (two) times a day as needed for anxiety      !! ALPRAZolam (XANAX) 0 5 mg tablet TAKE 2 AND 1/2 TABLETS DAILY IN DIVIDED DOSES AS DIRECTED       ammonium lactate (LAC-HYDRIN) 12 % cream Apply topically 2 (two) times a day  Qty: 385 g, Refills: 0    Associated Diagnoses: Pruritus      atorvastatin (LIPITOR) 20 mg tablet Take 20 mg by mouth every evening   Refills: 0      B Complex-C-Folic Acid (Natalia-Denys) TABS TAKE ONE TABLET BY MOUTH DAILY  Qty: 90 tablet, Refills: 3    Associated Diagnoses: ESRD on dialysis (HCC)      carvedilol (COREG) 25 mg tablet TAKE ONE TABLET BY MOUTH TWICE A DAY  Qty: 180 tablet, Refills: 7    Associated Diagnoses: CKD stage 5 secondary to hypertension (HCC)      cinacalcet (SENSIPAR) 30 mg tablet Take 30 mg by mouth daily      dicyclomine (BENTYL) 10 mg capsule TAKE 1 TABLET BY MOUTH EVERY 6 HOURS OR AS NEEDED FOR PAIN      insulin glargine (LANTUS) 100 units/mL subcutaneous injection Inject 12 Units under the skin daily at bedtime  Qty: 10 mL, Refills: 0    Associated Diagnoses: Diabetic polyneuropathy associated with type 2 diabetes mellitus ([facility] )      KIONEX 15 GM/60ML suspension Take by mouth Takes as a shake on dialysis days Tues-Thurs_Sat   Refills: 0      levothyroxine 50 mcg tablet Take 50 mcg by mouth daily      !! lidocaine (LIDODERM) 5 %       !! lidocaine (LIDODERM) 5 % Apply 1 patch topically daily Remove & Discard patch within 12 hours or as directed by MD  Qty: 14 patch, Refills: 0    Associated Diagnoses: Back pain      loratadine (CLARITIN) 10 mg tablet Take 10 mg by mouth daily      melatonin 3 mg Take 1 tablet (3 mg total) by mouth daily at bedtime  Qty: 30 tablet, Refills: 0    Associated Diagnoses: Insomnia      NIFEdipine (ADALAT CC) 30 MG 24 hr tablet Take 1 tablet (30 mg total) by mouth daily  Qty: 30 tablet, Refills: 0    Associated Diagnoses: Essential hypertension      NOVOLOG FLEXPEN 100 units/mL SOPN INJECT 1 TO 5 UNITS SUBCUTANEOUSLY THREE TIMES A DAY BEFORE MELAS AS PER SLIDING SCALE      nystatin (MYCOSTATIN) powder Apply topically 2 (two) times a day  Qty: 15 g, Refills: 0    Associated Diagnoses: Candidiasis of breast      ondansetron (ZOFRAN-ODT) 8 mg disintegrating tablet       oxyCODONE-acetaminophen (PERCOCET) 5-325 mg per tablet Take 1 tablet by mouth every 6 (six) hours as needed for moderate pain for up to 10 days Max Daily Amount: 4 tablets  Qty: 20 tablet, Refills: 0    Associated Diagnoses: Back pain      pantoprazole (PROTONIX) 40 mg tablet take 1 tablet by mouth twice a day      pregabalin (LYRICA) 50 mg capsule Take 1 PO daily, take 1 PO additionally directly have HD on HD days  Qty: 45 capsule, Refills: 2    Associated Diagnoses: Diabetic polyneuropathy associated with type 2 diabetes mellitus ([facility] );  Lumbar radiculopathy      senna (SENOKOT) 8 6 mg Take 2 tablets (17 2 mg total) by mouth daily at bedtime as needed for constipation  Qty: 30 each, Refills: 0    Associated Diagnoses: Ambulatory dysfunction      sertraline (ZOLOFT) 50 mg tablet Take 1 tablet (50 mg total) by mouth daily  Qty: 30 tablet, Refills: 0    Associated Diagnoses: Anxiety disorder      sevelamer (RENAGEL) 800 mg tablet Take 2 tablets (1,600 mg total) by mouth 3 (three) times a day with meals  Qty: 30 tablet, Refills: 0    Associated Diagnoses: ESRD on hemodialysis (HCC)      Sevelamer Carbonate 2 4 g PACK STIR 1 PACKET INTO FOOD 3 TIMES A DAY WITH MEALS      torsemide (DEMADEX) 100 mg tablet Take 1 tablet (100 mg total) by mouth 4 (four) times a week On Sunday, Monday, Wednesday, Friday  Refills: 0    Associated Diagnoses: Parenchymal renal hypertension      urea (CARMOL) 40 % APPLY TO AFFECTED AREA TOPICALLY EVERY DAY  Qty: 85 g, Refills: 2    Associated Diagnoses: Diabetic polyneuropathy associated with type 2 diabetes mellitus (HCC)      warfarin (COUMADIN) 3 mg tablet Take 3 tablets (9 mg total) by mouth daily Takes 9 mg Monday Sat  Qty: 10 tablet, Refills: 0    Associated Diagnoses: History of DVT (deep vein thrombosis)       ! ! - Potential duplicate medications found  Please discuss with provider  STOP taking these medications       doxycycline hyclate (VIBRAMYCIN) 100 mg capsule Comments:   Reason for Stopping:             No discharge procedures on file  PDMP Review       Value Time User    PDMP Reviewed  Yes 8/2/2022  7:07 AM LOIS Fam           ED Provider  Attending physically available and evaluated Mellissa Stein  I managed the patient along with the ED Attending      Electronically Signed by         Monserrat Frankel MD  08/12/22 7924

## 2022-08-12 NOTE — ASSESSMENT & PLAN NOTE
· Patient came to the hospital after had are being 2 falls at home with right-sided hip pain  · No acute osseous abnormality on CT scan  · Pain control  · PT OT evaluation

## 2022-08-12 NOTE — ASSESSMENT & PLAN NOTE
· Patient with previous history of PE  Subtherapeutic INR today     · Questionable compliance  · Recheck INR tomorrow

## 2022-08-12 NOTE — ASSESSMENT & PLAN NOTE
· Patient was started on antibiotics during the recent hospital stay due to concern for cellulitis of the right wrist region from previous IV site  · Patient reported that she had 2 more days of antibiotics left-continue with doxycycline for 2 more days  · Patient reported that there is drainage noted from the site but appears to be closed up  · Erythema and swelling noted

## 2022-08-12 NOTE — ASSESSMENT & PLAN NOTE
· Patient came to the hospital recently with back pain and was evaluated by Neurosurgery and Infectious Disease  · No plan for any biopsy or surgical intervention  ·

## 2022-08-12 NOTE — ASSESSMENT & PLAN NOTE
Lab Results   Component Value Date    HGBA1C 9 4 (H) 07/09/2022       No results for input(s): POCGLU in the last 72 hours  Blood Sugar Average: Last 72 hrs:   continue with Lantus and sliding scale at a lower dose  Monitor blood sugar  Patient with poor p o   Intake

## 2022-08-12 NOTE — ASSESSMENT & PLAN NOTE
Lab Results   Component Value Date    EGFR 5 08/12/2022    EGFR 6 08/04/2022    EGFR 7 08/02/2022    CREATININE 7 06 (H) 08/12/2022    CREATININE 6 88 (H) 08/04/2022    CREATININE 5 70 (H) 08/02/2022   Patient gets dialysis on Tuesday Thursday Saturday    Consult nephrology  Continue with Nephrocaps and sensipar and Renagel  Consult nephrology

## 2022-08-12 NOTE — ASSESSMENT & PLAN NOTE
· Patient is on Coumadin as outpatient  · INR is 1 15  · Patient reported that she did not take Coumadin yesterday  · On 9 mg of Coumadin    Will continue with 10 mg and monitor INR

## 2022-08-12 NOTE — ASSESSMENT & PLAN NOTE
· Monitor blood pressure    Continue with outpatient medication  · Continue with Coreg  · Auto substituted to Procardia  · Continue with torsemide

## 2022-08-12 NOTE — ASSESSMENT & PLAN NOTE
· Patient came to the hospital after 2 falls at home  She had a fall on 08/04 and had another fall as today    Both the falls when she was trying to sit down and slid down to the floor  · PT OT evaluation  · May need rehab

## 2022-08-13 ENCOUNTER — APPOINTMENT (OUTPATIENT)
Dept: DIALYSIS | Facility: HOSPITAL | Age: 57
DRG: 539 | End: 2022-08-13
Payer: MEDICARE

## 2022-08-13 LAB
ANION GAP SERPL CALCULATED.3IONS-SCNC: 10 MMOL/L (ref 4–13)
BUN SERPL-MCNC: 40 MG/DL (ref 5–25)
CALCIUM SERPL-MCNC: 9.1 MG/DL (ref 8.3–10.1)
CHLORIDE SERPL-SCNC: 97 MMOL/L (ref 96–108)
CO2 SERPL-SCNC: 30 MMOL/L (ref 21–32)
CREAT SERPL-MCNC: 8.49 MG/DL (ref 0.6–1.3)
ERYTHROCYTE [DISTWIDTH] IN BLOOD BY AUTOMATED COUNT: 15 % (ref 11.6–15.1)
GFR SERPL CREATININE-BSD FRML MDRD: 4 ML/MIN/1.73SQ M
GLUCOSE P FAST SERPL-MCNC: 151 MG/DL (ref 65–99)
GLUCOSE SERPL-MCNC: 117 MG/DL (ref 65–140)
GLUCOSE SERPL-MCNC: 127 MG/DL (ref 65–140)
GLUCOSE SERPL-MCNC: 133 MG/DL (ref 65–140)
GLUCOSE SERPL-MCNC: 151 MG/DL (ref 65–140)
GLUCOSE SERPL-MCNC: 153 MG/DL (ref 65–140)
HCT VFR BLD AUTO: 27.6 % (ref 34.8–46.1)
HGB BLD-MCNC: 8.6 G/DL (ref 11.5–15.4)
INR PPP: 1.12 (ref 0.84–1.19)
MCH RBC QN AUTO: 30.8 PG (ref 26.8–34.3)
MCHC RBC AUTO-ENTMCNC: 31.2 G/DL (ref 31.4–37.4)
MCV RBC AUTO: 99 FL (ref 82–98)
PLATELET # BLD AUTO: 208 THOUSANDS/UL (ref 149–390)
PMV BLD AUTO: 10.5 FL (ref 8.9–12.7)
POTASSIUM SERPL-SCNC: 4.4 MMOL/L (ref 3.5–5.3)
PROTHROMBIN TIME: 14.6 SECONDS (ref 11.6–14.5)
RBC # BLD AUTO: 2.79 MILLION/UL (ref 3.81–5.12)
SODIUM SERPL-SCNC: 137 MMOL/L (ref 135–147)
WBC # BLD AUTO: 7.28 THOUSAND/UL (ref 4.31–10.16)

## 2022-08-13 PROCEDURE — 5A1D70Z PERFORMANCE OF URINARY FILTRATION, INTERMITTENT, LESS THAN 6 HOURS PER DAY: ICD-10-PCS | Performed by: INTERNAL MEDICINE

## 2022-08-13 PROCEDURE — 85610 PROTHROMBIN TIME: CPT | Performed by: PHYSICIAN ASSISTANT

## 2022-08-13 PROCEDURE — 85027 COMPLETE CBC AUTOMATED: CPT | Performed by: FAMILY MEDICINE

## 2022-08-13 PROCEDURE — 80048 BASIC METABOLIC PNL TOTAL CA: CPT | Performed by: FAMILY MEDICINE

## 2022-08-13 PROCEDURE — 90935 HEMODIALYSIS ONE EVALUATION: CPT | Performed by: INTERNAL MEDICINE

## 2022-08-13 PROCEDURE — 97163 PT EVAL HIGH COMPLEX 45 MIN: CPT

## 2022-08-13 PROCEDURE — 99232 SBSQ HOSP IP/OBS MODERATE 35: CPT | Performed by: PHYSICIAN ASSISTANT

## 2022-08-13 PROCEDURE — G0257 UNSCHED DIALYSIS ESRD PT HOS: HCPCS

## 2022-08-13 PROCEDURE — 82948 REAGENT STRIP/BLOOD GLUCOSE: CPT

## 2022-08-13 PROCEDURE — 99223 1ST HOSP IP/OBS HIGH 75: CPT | Performed by: INTERNAL MEDICINE

## 2022-08-13 RX ORDER — HYDROMORPHONE HCL/PF 1 MG/ML
0.5 SYRINGE (ML) INJECTION
Status: DISCONTINUED | OUTPATIENT
Start: 2022-08-13 | End: 2022-08-16

## 2022-08-13 RX ORDER — MUSCLE RUB CREAM 100; 150 MG/G; MG/G
CREAM TOPICAL 4 TIMES DAILY PRN
Status: DISCONTINUED | OUTPATIENT
Start: 2022-08-13 | End: 2022-08-21 | Stop reason: HOSPADM

## 2022-08-13 RX ORDER — WARFARIN SODIUM 5 MG/1
10 TABLET ORAL
Status: DISCONTINUED | OUTPATIENT
Start: 2022-08-13 | End: 2022-08-20

## 2022-08-13 RX ORDER — OXYCODONE HYDROCHLORIDE AND ACETAMINOPHEN 5; 325 MG/1; MG/1
2 TABLET ORAL EVERY 6 HOURS PRN
Status: DISCONTINUED | OUTPATIENT
Start: 2022-08-13 | End: 2022-08-16

## 2022-08-13 RX ADMIN — INSULIN GLARGINE 8 UNITS: 100 INJECTION, SOLUTION SUBCUTANEOUS at 21:51

## 2022-08-13 RX ADMIN — MELATONIN 3 MG: at 21:51

## 2022-08-13 RX ADMIN — SEVELAMER HYDROCHLORIDE 1600 MG: 800 TABLET ORAL at 17:36

## 2022-08-13 RX ADMIN — OXYCODONE HYDROCHLORIDE AND ACETAMINOPHEN 2 TABLET: 5; 325 TABLET ORAL at 12:36

## 2022-08-13 RX ADMIN — WARFARIN SODIUM 10 MG: 5 TABLET ORAL at 17:36

## 2022-08-13 RX ADMIN — HYDROMORPHONE HYDROCHLORIDE 0.5 MG: 1 INJECTION, SOLUTION INTRAMUSCULAR; INTRAVENOUS; SUBCUTANEOUS at 14:39

## 2022-08-13 RX ADMIN — ATORVASTATIN CALCIUM 20 MG: 20 TABLET, FILM COATED ORAL at 17:36

## 2022-08-13 RX ADMIN — OXYCODONE HYDROCHLORIDE AND ACETAMINOPHEN 1 TABLET: 5; 325 TABLET ORAL at 03:35

## 2022-08-13 RX ADMIN — NYSTATIN: 100000 POWDER TOPICAL at 17:36

## 2022-08-13 RX ADMIN — HYDROMORPHONE HYDROCHLORIDE 0.5 MG: 1 INJECTION, SOLUTION INTRAMUSCULAR; INTRAVENOUS; SUBCUTANEOUS at 21:56

## 2022-08-13 RX ADMIN — MENTHOL, METHYL SALICYLATE: 10; 15 CREAM TOPICAL at 06:01

## 2022-08-13 RX ADMIN — LEVOTHYROXINE SODIUM 50 MCG: 75 TABLET ORAL at 06:01

## 2022-08-13 RX ADMIN — HYDROMORPHONE HYDROCHLORIDE 0.5 MG: 1 INJECTION, SOLUTION INTRAMUSCULAR; INTRAVENOUS; SUBCUTANEOUS at 17:36

## 2022-08-13 RX ADMIN — ONDANSETRON HYDROCHLORIDE 4 MG: 2 INJECTION, SOLUTION INTRAMUSCULAR; INTRAVENOUS at 07:46

## 2022-08-13 RX ADMIN — DOXYCYCLINE 100 MG: 100 CAPSULE ORAL at 21:52

## 2022-08-13 RX ADMIN — NEPHROCAP 1 CAPSULE: 1 CAP ORAL at 17:36

## 2022-08-13 RX ADMIN — HYDROMORPHONE HYDROCHLORIDE 0.5 MG: 1 INJECTION, SOLUTION INTRAMUSCULAR; INTRAVENOUS; SUBCUTANEOUS at 07:56

## 2022-08-13 RX ADMIN — PANTOPRAZOLE SODIUM 40 MG: 40 TABLET, DELAYED RELEASE ORAL at 06:01

## 2022-08-13 RX ADMIN — HYDROMORPHONE HYDROCHLORIDE 0.5 MG: 1 INJECTION, SOLUTION INTRAMUSCULAR; INTRAVENOUS; SUBCUTANEOUS at 00:47

## 2022-08-13 NOTE — PHYSICAL THERAPY NOTE
Physical Therapy Evaluation    Patient's Name: Lynda Platt    Admitting Diagnosis  Hip pain [M25 559]  Intractable pain [R52]    Problem List  Patient Active Problem List   Diagnosis    Essential hypertension    History of venous thromboembolism    Abnormal uterine bleeding    Glaucoma    ESRD on hemodialysis    Anxiety disorder    Knee pain    Ambulatory dysfunction    Hemodialysis status ([facility] )    Primary osteoarthritis of right knee    Esophageal reflux    Colon cancer screening    Gastroesophageal reflux disease    Meningioma ([facility])    Nausea and vomiting    Secondary hyperparathyroidism of renal origin ([facility] )    Dyspnea    Hyperkalemia    Anemia of chronic disease    Disruption of internal surgical wound    Hyponatremia    Parenchymal renal hypertension    Candidiasis of breast    Hemodialysis-associated hypotension    Lumbar radiculopathy    Low back pain with sciatica    Flu-like symptoms    Diabetic polyneuropathy associated with type 2 diabetes mellitus ([facility] )    Tinea pedis    Encounter for diabetic foot exam ([place] [address] )    Corns and callosities    History of transmetatarsal amputation of right foot ([facility] )    Morbid obesity    Hyperlipidemia    History of claustrophobia    Allergic rhinitis    Benign neoplasm of skin    Cardiomyopathy ([facility] )    Cervical dysplasia    Chronic endometritis    Complication from renal dialysis device    Concussion without loss of consciousness    Type 2 diabetes mellitus ([facility] )    Acquired hypothyroidism    Legal blindness, as defined in [place] [facility]    Limb pain    Mononeuritis of upper limb, unspecified laterality    Phlebitis and thrombophlebitis of superficial vessels of lower extremities    Pulmonary embolus ([facility])    S/P foot surgery, right    Tinea unguium    Bilateral primary osteoarthritis of knee    Bilateral patellofemoral syndrome    Chronic pain syndrome    A-V fistula ([facility])    Right foot pain    Arteriovenous fistula for hemodialysis in place, primary ([facility] ) Healthcare-associated pneumonia    Pneumonia    Chronic anticoagulation    Essential hypertension    Sigmoid diverticulitis    Postop check    Pruritus    Constipation    Right knee pain    Fall    Hypoxia    COVID-19    Problem with vascular access    Concern for Osteomyelitis/Discitis of lumbar region    Intractable back pain    Cellulitis       Past Medical History  Past Medical History:   Diagnosis Date    Abnormal uterine bleeding (AUB)     Anemia     Anxiety     Arthritis     Chronic kidney disease     Claustrophobia     Diabetes mellitus ([facility] )     Disease of thyroid gland     DVT (deep venous thrombosis) (HCC)     End stage kidney disease (HCC)     Foot ulcer due to secondary DM ([facility] )     Hemodialysis patient ([facility] )     Tues, Thurs, Sat    Hypertension     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[facility] )     right eye    Morbid obesity (HCC)     Osteomyelitis of fifth toe of right foot (HCC)     Panic attacks     Pulmonary embolism (HCC)     Reflux esophagitis     Thrombophlebitis of saphenous vein     Warfarin anticoagulation        Past Surgical History  Past Surgical History:   Procedure Laterality Date    AMPUTATION      r 4th toe    ARTERIOVENOUS GRAFT PLACEMENT      CATARACT EXTRACTION Bilateral     CERVICAL BIOPSY  W/ LOOP ELECTRODE EXCISION       SECTION      DIALYSIS FISTULA CREATION      DILATION AND CURETTAGE OF UTERUS      ENDOMETRIAL ABLATION W/ NOVASURE      EYE SURGERY      HYSTERECTOMY      @ age 57 (complete)    IR AV FISTULAGRAM/GRAFTOGRAM  10/11/2019    IR AV FISTULAGRAM/GRAFTOGRAM  2020    IR AV FISTULAGRAM/GRAFTOGRAM  2020    IR NON-TUNNELED CENTRAL LINE PLACEMENT  2021    IR NON-TUNNELED CENTRAL LINE PLACEMENT  10/4/2021    OOPHORECTOMY Bilateral     @ age 57    [place] Right 2021    Procedure: AMPUTATION TRANSMETATARSAL (TMA);  Surgeon: Andria Valenzuela DPM;  Location: [facility];  Service: Podiatry    LA AMPUTATION TOE,MT-P JT Right 5/28/2020    Procedure: AMPUTATION TOE- 5th;  Surgeon: Tesfaye Guerra DPM;  Location: [facility];  Service: Podiatry    NY COLONOSCOPY FLX DX W/COLLJ SPEC WHEN PFRMD N/A 3/13/2019    Procedure: COLONOSCOPY;  Surgeon: Kaila Sabillon MD;  Location: [facility]; Service: Gastroenterology    NY ESOPHAGOGASTRODUODENOSCOPY TRANSORAL DIAGNOSTIC N/A 3/13/2019    Procedure: ESOPHAGOGASTRODUODENOSCOPY (EGD); Surgeon: Kaila Sabillon MD;  Location: [facility]; Service: Gastroenterology    [place] LAPAROSCOPY W TOT HYSTERECT UTERUS [address] OR LESS N/A 2/16/2016    Procedure: HYSTERECTOMY LAPAROSCOPIC TOTAL [facility]), with bilateral salpingectomy;  Surgeon: Oniel Molina DO;  Location: [facility];  Service: Gynecology    THROMBECTOMY / ARTERIOVENOUS GRAFT REVISION      TOE SURGERY Right     removal of the 4th toe    WOUND DEBRIDEMENT Right 10/8/2021    Procedure: R 1st MTPJ washout ;  Surgeon: Rudy Ahuja DPM;  Location: [facility];  Service: Podiatry          08/13/22 1345   PT Last Visit   PT Visit Date 08/13/22   Note Type   Note type Evaluation   Pain Assessment   Pain Assessment Tool 0-10   Pain Score 8   Pain Location/Orientation Orientation: Right;Location: Hip  (R hip > back)   Patient's Stated Pain Goal No pain   Hospital Pain Intervention(s) Repositioned   Restrictions/Precautions   Weight Bearing Precautions Per Order No   Other Precautions Pain; Fall Risk;Multiple lines;Telemetry; Impulsive   Home Living   Type of Home House   Additional Comments Resides w/  in 2 story home  Indep self care, ambulates w/ cane     Prior Function   Level of [place] Independent with ADLs and functional mobility   Falls in the last 6 months 5 to 10  (2 prior to this admit)   General   Family/Caregiver Present No   Cognition   Overall Cognitive Status Impaired   Arousal/Participation Responsive   Orientation Level Oriented X4   Memory Unable to assess   Following Commands Follows one step commands without difficulty   Subjective   Subjective c/o pain R hip > low back  asking for hot pack  notes too much pain to try to ambulate presently   RLE Assessment   RLE Assessment   (ROM grossly WFL, strength grossly 3 to 3+/5, limited by pain)   LLE Assessment   LLE Assessment   (strength grossly 4-/5)   Coordination   Movements are Fluid and Coordinated 0   Bed Mobility   Rolling R 5  Supervision   Rolling L 5  Supervision   Supine to Sit 5  Supervision   Additional Comments rolling all sides, bed mob partially completed but too much pain to proceed  Balance   Static Sitting Fair -   Endurance Deficit   Endurance Deficit Yes   Endurance Deficit Description fatigue, weakness, pain   Activity Tolerance   Activity Tolerance Patient limited by fatigue;Patient limited by pain;Treatment limited secondary to medical complications (Comment)   Nurse Made Aware yes   Assessment   Prognosis Fair   Problem List Decreased strength;Decreased endurance; Impaired balance;Decreased coordination;Decreased mobility;Pain   Assessment Pt seen for physical therapy evaluation, mostly bedlevel  Pt is a 55 y/o female w/ history/comorbidities of ESRD on HD, obesoty, DM II, PAD w/ TMA, anxiety, PE, chronic pain, recent admit w/ cellulitis, OM/discitis of spine who now returns w/ fall x 2- striking R hip  CT R LE - for injury  Due to acute medical issues, need for hospital re-admit, pain, fall risk, note unstable clinical picture  PT consulted to assess mobility, d/c needs  Pt presents w/ decreased bed mob, sitting balance/tolerance, endurance, B LE strength R > L due to pain, barriers at home  Pt will benefit from skilled PT to correct for the above problems, as well as to complete mobility portion of eval next session as able  Currently, lean towards rehab at d/c, but can better assess after gait evaluation     Goals   Patient Goals to have less pain   STG Expiration Date 08/27/22   Short Term Goal #1 1-2 wks: BEd mob and rolling w/ indep, increase B LE strength by 1/2 -1 grade, PT to see to complete mobility portion of eval   PT Treatment Day 0   Plan   Treatment/Interventions Functional transfer training;LE strengthening/ROM; Therapeutic exercise;Elevations; Endurance training;Patient/family training;Equipment eval/education; Bed mobility;Gait training   PT Frequency 3-5x/wk   Recommendation   PT Discharge Recommendation Post acute rehabilitation services  (TBD)   AM-PAC Basic Mobility Inpatient   Turning in Bed Without Bedrails 4   Lying on Back to Sitting on Edge of Flat Bed 3   Moving Bed to Chair 2   Standing Up From Chair 2   Walk in Room 2   Climb 3-5 Stairs 2   Basic Mobility Inpatient Raw Score 15   Basic Mobility Standardized Score 36 97   Highest Level Of Mobility   JH-HLM Goal 4: Move to chair/commode   JH-HLM Achieved 2: Bed activities/Dependent transfer         Rogena Cogan PT, DPT, CSRS

## 2022-08-13 NOTE — PLAN OF CARE
Problem: PHYSICAL THERAPY ADULT  Goal: Performs mobility at highest level of function for planned discharge setting  See evaluation for individualized goals  Description: Treatment/Interventions: Functional transfer training, LE strengthening/ROM, Therapeutic exercise, Elevations, Endurance training, Patient/family training, Equipment eval/education, Bed mobility, Gait training          See flowsheet documentation for full assessment, interventions and recommendations  Note: Prognosis: Fair  Problem List: Decreased strength, Decreased endurance, Impaired balance, Decreased coordination, Decreased mobility, Pain  Assessment: Pt seen for physical therapy evaluation, mostly bedlevel  Pt is a 57 y/o female w/ history/comorbidities of ESRD on HD, obesoty, DM II, PAD w/ TMA, anxiety, PE, chronic pain, recent admit w/ cellulitis, OM/discitis of spine who now returns w/ fall x 2- striking R hip  CT R LE - for injury  Due to acute medical issues, need for hospital re-admit, pain, fall risk, note unstable clinical picture  PT consulted to assess mobility, d/c needs  Pt presents w/ decreased bed mob, sitting balance/tolerance, endurance, B LE strength R > L due to pain, barriers at home  Pt will benefit from skilled PT to correct for the above problems, as well as to complete mobility portion of eval next session as able  Currently, lean towards rehab at d/c, but can better assess after gait evaluation  PT Discharge Recommendation: Post acute rehabilitation services (TBD)    See flowsheet documentation for full assessment

## 2022-08-13 NOTE — ASSESSMENT & PLAN NOTE
· Patient came to the hospital recently with back pain and was evaluated by Neurosurgery and Infectious Disease  · No plan for any biopsy or surgical intervention

## 2022-08-13 NOTE — CONSULTS
Consultation - Nephrology   Lynda Platt 56 y o  female MRN: [id number]  Unit/Bed#: -01 Encounter: [id number]      Assessment/Plan:  1  End-stage renal disease on maintenance hemodialysis currently Tuesday Thursday Saturday, outpatient clinic [address], estimated dry weight 123 kg  2  CKD associated mineral bone disorder, calcium low at 6 8, hold further Sensipar, continue with Renagel  3  Anemia of chronic kidney disease currently not on MITCHELL therapy given history of pulmonary embolism  4  Status post fall with right-sided hip pain, imaging shows no acute osseous abnormality  5  Recent diagnosis cellulitis - to complete outpatient doxycycline  6  Chronic lower back pain recent MRI showing L4/L5 diskitis  Blood cultures at that time negative  7  Uncontrolled hypertension, continue with outpatient antihypertensive regimen  8  History pulmonary embolus, currently on Coumadin  9  Diabetes with recent hemoglobin A1c at 9 4    Plan:  · Will plan on short hemodialysis treatment today, patient does not want significant fluid removal today  · Pain management as per primary service  · To continue with/complete antibiotic treatment  · Stop Sensipar given hypocalcemia  · Continue with current phosphate binders  · Continue with outpatient antihypertensive regimen    History of Present Illness   Physician Requesting Consult: Caitlin Novak MD  Reason for Consult / Principal Problem:  End-stage renal disease  HPI: Lynda Platt is a 56y o  year old female who presents with fall    Patient is a 65-year-old female with a past medical history significant for end-stage renal disease, diabetes, hypertension, PE recent lower extremity amputation  Recently discharged on 08/04 complaining of back pain  At that time concern for possible diskitis however blood cultures were negative    Eventually discharged home with doxycycline given suspected cellulitis of the left wrist   Unfortunately patient states that she has not been eating well at home with complaints of nausea and vomiting  No abdominal pain  Complains of chronic back discomfort  No reports of chest pain or shortness of breath  History obtained from chart review and the patient    Review of Systems   Constitutional: Positive for activity change and appetite change  Respiratory: Negative for chest tightness and shortness of breath  Cardiovascular: Negative for chest pain and leg swelling  Gastrointestinal: Positive for diarrhea, nausea and vomiting  Negative for abdominal pain  Musculoskeletal: Positive for arthralgias and back pain         Pertinent findings of a 10 point review of systems noted above otherwise all others negative    Historical Information   Patient Active Problem List   Diagnosis    Essential hypertension    History of venous thromboembolism    Abnormal uterine bleeding    Glaucoma    ESRD on hemodialysis    Anxiety disorder    Knee pain    Ambulatory dysfunction    [facility] status ([facility]    Primary osteoarthritis of right knee    Esophageal reflux    Colon cancer screening    Gastroesophageal reflux disease    Meningioma (HCC)    Nausea and vomiting    Secondary hyperparathyroidism of renal origin ([facility]    Dyspnea    Hyperkalemia    Anemia of chronic disease    Disruption of internal surgical wound    Hyponatremia    Parenchymal renal hypertension    Candidiasis of breast    Hemodialysis-associated hypotension    Lumbar radiculopathy    Low back pain with sciatica    Flu-like symptoms    Diabetic polyneuropathy associated with type 2 diabetes mellitus ([facility]    Tinea pedis    Encounter for diabetic foot exam ([facility]    Corns and callosities    History of transmetatarsal amputation of right foot ([facility]    Morbid obesity    Hyperlipidemia    History of claustrophobia    Allergic rhinitis    Benign neoplasm of skin    Cardiomyopathy ([facility]    Cervical dysplasia    Chronic endometritis    Complication from renal dialysis device    Concussion without loss of consciousness    Type 2 diabetes mellitus ([facility]    Acquired hypothyroidism    Legal blindness, as defined in [place] [facility]    Limb pain    Mononeuritis of upper limb, unspecified laterality    Phlebitis and thrombophlebitis of superficial vessels of lower extremities    Pulmonary embolus (HCC)    S/P foot surgery, right    Tinea unguium    Bilateral primary osteoarthritis of knee    Bilateral patellofemoral syndrome    Chronic pain syndrome    [facility] fistula ([facility]    Right foot pain    Arteriovenous fistula for hemodialysis in place, primary ([facility] )    Healthcare-associated pneumonia    Pneumonia    Chronic anticoagulation    Essential hypertension    Sigmoid diverticulitis    Postop check    Pruritus    Constipation    Right knee pain    Fall    Hypoxia    COVID-19    Problem with vascular access    Concern for Osteomyelitis/Discitis of lumbar region    Intractable back pain    Cellulitis     Past Medical History:   Diagnosis Date    Abnormal uterine bleeding (AUB)     Anemia     Anxiety     Arthritis     Chronic kidney disease     Claustrophobia     Diabetes mellitus ([facility]     Disease of thyroid gland     DVT (deep venous thrombosis) ([facility]     End stage kidney disease ([facility] )     Foot ulcer due to secondary DM ([facility] )     [facility]     Tues, Th, Sat    Hypertension     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[facility] )     right eye    Morbid obesity ([facility] )     Osteomyelitis of fifth toe of right foot ([facility] )     Panic attacks     Pulmonary embolism ([facility]     Reflux esophagitis     Thrombophlebitis of saphenous vein     Warfarin anticoagulation      Past Surgical History:   Procedure Laterality Date    AMPUTATION      r 4th toe    ARTERIOVENOUS GRAFT PLACEMENT      CATARACT EXTRACTION Bilateral     CERVICAL BIOPSY  W/ LOOP ELECTRODE EXCISION       SECTION      DIALYSIS FISTULA CREATION  DILATION AND CURETTAGE OF UTERUS      ENDOMETRIAL ABLATION W/ NOVASURE      EYE SURGERY      HYSTERECTOMY      @ age 57 (complete)    IR AV FISTULAGRAM/GRAFTOGRAM  10/11/2019    IR AV FISTULAGRAM/GRAFTOGRAM  8/12/2020    IR AV FISTULAGRAM/GRAFTOGRAM  12/23/2020    IR NON-TUNNELED CENTRAL LINE PLACEMENT  6/29/2021    IR NON-TUNNELED CENTRAL LINE PLACEMENT  10/4/2021    OOPHORECTOMY Bilateral     @ age 57    [address] Right 12/26/2021    Procedure: AMPUTATION TRANSMETATARSAL (TMA);  Surgeon: Shantel Orantes DPM;  [facility];  Service: Podiatry    MD AMPUTATION TOE,MT-P JT Right 5/28/2020    Procedure: AMPUTATION TOE- 5th;  Surgeon: Sahntel Orantes DPM;  Location[facility];  Service: Podiatry    MD COLONOSCOPY FLX DX W/COLLJ [address] PFRMD N/A 3/13/2019    Procedure: COLONOSCOPY;  Surgeon: Margo Newton MD;  Location: [facility]; Service: Gastroenterology    MD ESOPHAGOGASTRODUODENOSCOPY TRANSORAL DIAGNOSTIC N/A 3/13/2019    Procedure: ESOPHAGOGASTRODUODENOSCOPY (EGD); Surgeon: Margo Newton MD;  [facility];   Service: Gastroenterology    MD LAPAROSCOPY W TOT HYSTERECT UTERUS 250 GRAM OR LESS N/A 2/16/2016    Procedure: HYSTERECTOMY LAPAROSCOPIC TOTAL [facility]), with bilateral salpingectomy;  Surgeon: Niecy Gibbs DO;  [facility];  Service: Gynecology    THROMBECTOMY / ARTERIOVENOUS GRAFT REVISION      TOE SURGERY Right     removal of the 4th toe    WOUND DEBRIDEMENT Right 10/8/2021    Procedure: R 1st MTPJ washout ;  Surgeon: Anila Jett DPM;  [facility];  Service: Podiatry     Social History   Social History     Substance and Sexual Activity   Alcohol Use Never    Alcohol/week: 0 0 standard drinks     Social History     Substance and Sexual Activity   Drug Use No     Social History     Tobacco Use   Smoking Status Never Smoker   Smokeless Tobacco Never Used     Family History   Problem Relation Age of Onset    Heart disease Family     Diabetes Family     Heart disease Mother     Cancer Brother         neck    Throat cancer Brother     Ovarian cancer Maternal Aunt 36       Meds/Allergies   current meds:   Current Facility-Administered Medications   Medication Dose Route Frequency    acetaminophen (TYLENOL) tablet 650 mg  650 mg Oral Q6H PRN    albuterol (PROVENTIL HFA,VENTOLIN HFA) inhaler 2 puff  2 puff Inhalation Q6H PRN    ALPRAZolam (XANAX) tablet 0 25 mg  0 25 mg Oral BID PRN    atorvastatin (LIPITOR) tablet 20 mg  20 mg Oral Daily With Dinner    b complex-vitamin C-folic acid (NEPHROCAPS) capsule 1 capsule  1 capsule Oral Daily With Dinner    carvedilol (COREG) tablet 25 mg  25 mg Oral BID    dicyclomine (BENTYL) capsule 10 mg  10 mg Oral TID PRN    doxycycline hyclate (VIBRAMYCIN) capsule 100 mg  100 mg Oral Q12H [address]    HYDROmorphone (DILAUDID) injection 0 5 mg  0 5 mg Intravenous Q3H PRN    insulin glargine (LANTUS) subcutaneous injection 8 Units 0 08 mL  8 Units Subcutaneous HS    insulin lispro (HumaLOG) 100 units/mL subcutaneous injection 1-5 Units  1-5 Units Subcutaneous TID AC    insulin lispro (HumaLOG) 100 units/mL subcutaneous injection 1-5 Units  1-5 Units Subcutaneous HS    insulin lispro (HumaLOG) 100 units/mL subcutaneous injection 3 Units  3 Units Subcutaneous TID With Meals    levothyroxine tablet 50 mcg  50 mcg Oral Early Morning    lidocaine (LIDODERM) 5 % patch 1 patch  1 patch Topical Daily    loratadine (CLARITIN) tablet 10 mg  10 mg Oral Daily    melatonin tablet 3 mg  3 mg Oral HS    menthol-methyl salicylate (BENGAY) 97-53 % cream   Apply externally 4x Daily PRN    NIFEdipine (PROCARDIA XL) 24 hr tablet 30 mg  30 mg Oral Daily    nystatin (MYCOSTATIN) powder   Topical BID    ondansetron (ZOFRAN) injection 4 mg  4 mg Intravenous Q6H PRN    oxyCODONE-acetaminophen (PERCOCET) 5-325 mg per tablet 1 tablet  1 tablet Oral Q6H PRN    oxyCODONE-acetaminophen (PERCOCET) 5-325 mg per tablet 2 tablet  2 tablet Oral Q6H PRN    pantoprazole (PROTONIX) EC tablet 40 mg  40 mg Oral Early Morning    polyethylene glycol (MIRALAX) packet 17 g  17 g Oral Daily    pregabalin (LYRICA) capsule 50 mg  50 mg Oral Daily    senna (SENOKOT) tablet 17 2 mg  2 tablet Oral HS PRN    senna (SENOKOT) tablet 8 6 mg  1 tablet Oral Daily    sertraline (ZOLOFT) tablet 75 mg  75 mg Oral Daily    sevelamer (RENAGEL) tablet 1,600 mg  1,600 mg Oral TID With Meals    warfarin (COUMADIN) tablet 10 mg  10 mg Oral Daily (warfarin)       Allergies   Allergen Reactions    Iodinated Diagnostic Agents Itching    Keflex [Cephalexin] Hives    Eggs Or Egg-Derived Products - Food Allergy Rash    Wound Dressing Adhesive Rash         Objective   BP (!) 181/69   Pulse 74   Temp (!) 97 4 °F (36 3 °C)   Resp 17   Wt 127 kg (280 lb)   LMP 02/12/2016 (Exact Date)   SpO2 98%   BMI 45 19 kg/m²   No intake or output data in the 24 hours ending 08/13/22 0957    Current Weight: Weight - Scale: 127 kg (280 lb)    Physical Exam  Constitutional:       Appearance: She is obese  She is not ill-appearing  HENT:      Head: Normocephalic and atraumatic  Eyes:      General: No scleral icterus  Cardiovascular:      Rate and Rhythm: Normal rate and regular rhythm  Pulmonary:      Effort: Pulmonary effort is normal       Breath sounds: Normal breath sounds  Abdominal:      Palpations: Abdomen is soft  Tenderness: There is no abdominal tenderness  Musculoskeletal:         General: Deformity (Lower extremity amputation) present  Right lower leg: No edema  Left lower leg: No edema  Lymphadenopathy:      Cervical: No cervical adenopathy  Skin:     General: Skin is warm and dry  Findings: No rash  Neurological:      Mental Status: She is alert  She is disoriented             Lab Results:    Results from last 7 days   Lab Units 08/12/22  1515   WBC Thousand/uL 8 21   HEMOGLOBIN g/dL 8 1* HEMATOCRIT % 25 6*   PLATELETS Thousands/uL 206     Results from last 7 days   Lab Units 08/12/22  1515   POTASSIUM mmol/L 5 3   CHLORIDE mmol/L 99   CO2 mmol/L 29   BUN mg/dL 33*   CREATININE mg/dL 7 06*   CALCIUM mg/dL 6 8*

## 2022-08-13 NOTE — ASSESSMENT & PLAN NOTE
Lab Results   Component Value Date    HGBA1C 9 4 (H) 07/09/2022       Recent Labs     08/12/22 2005 08/13/22  0806   POCGLU 94 153*       Blood Sugar Average: Last 72 hrs:  (P) 123 5     · continue with Lantus and sliding scale at a lower dose  · Monitor blood sugar  · Patient with poor p o  Intake, SO reports that when she eats she gets an upset stomach/vomits frequently    Recommend that she have GI evaluation for gastric emptying study as an outpt to evaluate for gastroparesis -- she was recommended this by GI at EGD 11/2021

## 2022-08-13 NOTE — H&P
[facility]  H&P- Mcallister Horse 1965, 56 y o  female MRN: [id number]  Unit/Bed#: -01 Encounter: [id number]  Primary Care Provider: Kobi Ferrera MD   Date and time admitted to hospital: 8/12/2022 11:43 AM    * Fall  Assessment & Plan  · Patient came to the hospital after had are being 2 falls at home with right-sided hip pain  · No acute osseous abnormality on CT scan  · Pain control  · PT OT evaluation    Anemia of chronic disease  Assessment & Plan  · Hemoglobin at baseline  · Monitor    Cellulitis  Assessment & Plan  · Patient was started on antibiotics during the recent hospital stay due to concern for cellulitis of the right wrist region from previous IV site  · Patient reported that she had 2 more days of antibiotics left-continue with doxycycline for 2 more days  · Patient reported that there is drainage noted from the site but appears to be closed up  · Erythema and swelling noted    Concern for Osteomyelitis/Discitis of lumbar region  Assessment & Plan  · Patient came to the hospital recently with back pain and was evaluated by Neurosurgery and Infectious Disease  · No plan for any biopsy or surgical intervention  ·     Essential hypertension  Assessment & Plan  · Monitor blood pressure  Continue with outpatient medication  · Continue with Coreg  · Auto substituted to Procardia  · Continue with torsemide    Chronic anticoagulation  Assessment & Plan  · Patient is on Coumadin as outpatient  · INR is 1 15  · Patient reported that she did not take Coumadin yesterday  · On 9 mg of Coumadin  Will continue with 10 mg and monitor INR    Chronic pain syndrome  Assessment & Plan  · Followed by pain management as outpatient    Pulmonary embolus [facility])  Assessment & Plan  · Patient with previous history of PE  Subtherapeutic INR today     · Questionable compliance  · Recheck INR tomorrow    Acquired hypothyroidism  Assessment & Plan  · Continue Synthroid    Type 2 diabetes mellitus ([facility] [address] )  Assessment & Plan  Lab Results   Component Value Date    HGBA1C 9 4 (H) 07/09/2022       No results for input(s): POCGLU in the last 72 hours  Blood Sugar Average: Last 72 hrs:   continue with Lantus and sliding scale at a lower dose  Monitor blood sugar  Patient with poor p o  Intake    Hyperlipidemia  Assessment & Plan  · Continue with statin    Morbid obesity  Assessment & Plan  · TLC as outpatient    Secondary hyperparathyroidism of renal origin [facility])  Assessment & Plan  · Continue Sensipar    Ambulatory dysfunction  Assessment & Plan  · Patient came to the hospital after 2 falls at home  She had a fall on 08/04 and had another fall as today  Both the falls when she was trying to sit down and slid down to the floor  · PT OT evaluation  · May need rehab    Anxiety disorder  Assessment & Plan  · Continue with p r n  Xanax    ESRD on hemodialysis  Assessment & Plan  Lab Results   Component Value Date    EGFR 5 08/12/2022    EGFR 6 08/04/2022    EGFR 7 08/02/2022    CREATININE 7 06 (H) 08/12/2022    CREATININE 6 88 (H) 08/04/2022    CREATININE 5 70 (H) 08/02/2022   Patient gets dialysis on Tuesday Thursday Saturday  Consult nephrology  Continue with Nephrocaps and sensipar and Renagel  Consult nephrology    Essential hypertension  Assessment & Plan  · Monitor blood pressure        VTE Pharmacologic Prophylaxis: VTE Score: 8 Coumadin  Code Status: Level 1 - Full Code   Discussion with family: Patient declined call to   Anticipated Length of Stay:  Patient is admitted under observation status for pain control  Total Time for Visit, including Counseling / Coordination of Care: 70 minutes Greater than 50% of this total time spent on direct patient counseling and coordination of care      Chief Complaint:  Right hip pain    History of Present Illness:  Rosalva Segura is a 56 y o  female with a PMH of  who presents with chronic pain he end-stage renal disease on dialysis type 2 diabetes mellitus history of PAD with TMA on the left came to the hospital after she had a fall at home  Patient reported that she was recently discharged from the hospital on 08/04 she had a fall on that day she was trying to sit down in the chair and sleepy since she slid down and fell on her hip  Yesterday after coming back from the dialysis she was trying to sit up when her bed and she slid off  She came to the hospital with right-sided hip pain  Imaging studies were negative for any acute fracture  Patient also reported that she was discharged with antibiotics during the last admission due to thrombophlebitis of left right wrist from previous IV site  Patient was on doxycycline  Patient reported that she had 2 more days of doxycycline  No fever  Patient reported that she noted some draining change a earlier currently there is no drainage and appears that there is no fluctuation or significant erythema  Patient reported that at home she was not eating well  Patient reported that since Wednesday she has been only consuming soups  Complaining of nausea and vomiting  Patient follows with pain management as outpatient    Review of Systems:  Review of Systems   Constitutional: Positive for activity change, appetite change and fatigue  Eyes: Negative  Respiratory: Negative for chest tightness and shortness of breath  Cardiovascular: Negative for chest pain  Gastrointestinal: Positive for nausea and vomiting  Negative for abdominal pain  Endocrine: Negative  Musculoskeletal: Positive for arthralgias, back pain, gait problem and myalgias  Skin: Positive for rash and wound  Hematological: Negative  Psychiatric/Behavioral: Negative          Past Medical and Surgical History:   Past Medical History:   Diagnosis Date    Abnormal uterine bleeding (AUB)     Anemia     Anxiety     Arthritis     Chronic kidney disease     Claustrophobia     Diabetes mellitus ([address] )     Disease of thyroid gland     DVT (deep venous thrombosis) (HCC)     End stage kidney disease (HCC)     Foot ulcer due to secondary DM ([facility] [address] )     Hemodialysis patient ([facility] [address] )     Tues, Thchandu, Sat    Hypertension     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[facility] [address] )     right eye    Morbid obesity ([facility] [address] )     Osteomyelitis of fifth toe of right foot ([facility] [address] )     Panic attacks     Pulmonary embolism (HCC)     Reflux esophagitis     Thrombophlebitis of saphenous vein     Warfarin anticoagulation        Past Surgical History:   Procedure Laterality Date    AMPUTATION      r 4th toe    ARTERIOVENOUS GRAFT PLACEMENT      CATARACT EXTRACTION Bilateral     CERVICAL BIOPSY  W/ LOOP ELECTRODE EXCISION       SECTION      DIALYSIS FISTULA CREATION      DILATION AND CURETTAGE OF UTERUS      ENDOMETRIAL ABLATION W/ NOVASURE      EYE SURGERY      HYSTERECTOMY      @ age 57 (complete)    IR AV FISTULAGRAM/GRAFTOGRAM  10/11/2019    IR AV FISTULAGRAM/GRAFTOGRAM  2020    IR AV FISTULAGRAM/GRAFTOGRAM  2020    IR NON-TUNNELED CENTRAL LINE PLACEMENT  2021    IR NON-TUNNELED CENTRAL LINE PLACEMENT  10/4/2021    OOPHORECTOMY Bilateral     @ age 55    [facility] Right 2021    Procedure: AMPUTATION TRANSMETATARSAL (TMA);  Surgeon: Shantel Orantes DPM;  [facility];  Service: Podiatry    NM AMPUTATION TOE,MT-P JT Right 2020    Procedure: AMPUTATION TOE- 5th;  Surgeon: Shantel Orantes DPM;  [facility]: [facility];  Service: Podiatry    NM COLONOSCOPY FLX DX W/COLLJ SPEC WHEN PFRMD N/A 3/13/2019    Procedure: COLONOSCOPY;  Surgeon: Margo Newton MD;  Location: [facility]; Service: Gastroenterology    NM ESOPHAGOGASTRODUODENOSCOPY TRANSORAL DIAGNOSTIC N/A 3/13/2019    Procedure: ESOPHAGOGASTRODUODENOSCOPY (EGD); Surgeon: Margo Newton MD;  [facility];   Service: Gastroenterology    NM LAPAROSCOPY W TOT HYSTERECT UTERUS 250 GRAM OR LESS N/A 2/16/2016    Procedure: HYSTERECTOMY LAPAROSCOPIC TOTAL ([address]), with bilateral salpingectomy;  Surgeon: Violeta Mejia DO;  [facility];  Service: Gynecology    THROMBECTOMY / ARTERIOVENOUS GRAFT REVISION      TOE SURGERY Right     removal of the 4th toe    WOUND DEBRIDEMENT Right 10/8/2021    Procedure: R 1st MTPJ washout ;  Surgeon: Caitlin Nelson DPM;  [facility];  Service: Podiatry       Meds/Allergies:  Prior to Admission medications    Medication Sig Start Date End Date Taking?  Authorizing Provider   Accu-Chek Guide test strip use to TEST BLOOD SUGAR two to three times a day 7/5/21   Historical Provider, MD   Accu-Chek Softclix Lancets lancets 3 (three) times a day Use to test blood sugar 12/30/20   Historical Provider, MD   albuterol (ProAir HFA) 90 mcg/act inhaler Inhale 2 puffs every 6 (six) hours as needed for wheezing or shortness of breath 7/3/21   Ej Aly DO   ALPRAZolam Jimmy Lions) 0 25 mg tablet Take 0 25 mg by mouth 2 (two) times a day as needed for anxiety    Historical Provider, MD   ALPRAZolam Jimmy Lions) 0 5 mg tablet TAKE 2 AND 1/2 TABLETS DAILY IN DIVIDED DOSES AS DIRECTED  3/14/22   Historical Provider, MD   ammonium lactate (LAC-HYDRIN) 12 % cream Apply topically 2 (two) times a day 11/13/21   Darryn Bates MD   atorvastatin (LIPITOR) 20 mg tablet Take 20 mg by mouth every evening  4/24/18   Historical Provider, MD CURIEL Complex-C-Folic Acid (Natalia-Denys) TABS TAKE ONE TABLET BY MOUTH DAILY 5/17/22   Marj Huertas DO   carvedilol (COREG) 25 mg tablet TAKE ONE TABLET BY MOUTH TWICE A DAY 6/17/22   Marj Huertas DO   cinacalcet (SENSIPAR) 30 mg tablet Take 30 mg by mouth daily    Historical Provider, MD   dicyclomine (BENTYL) 10 mg capsule TAKE 1 TABLET BY MOUTH EVERY 6 HOURS OR AS NEEDED FOR PAIN 3/10/22   Historical Provider, MD   doxycycline hyclate (VIBRAMYCIN) 100 mg capsule Take 1 capsule (100 mg total) by mouth every 12 (twelve) hours for 7 days 8/4/22 8/11/22  Gabino Winters DO   insulin glargine (LANTUS) 100 units/mL subcutaneous injection Inject 12 Units under the skin daily at bedtime 7/14/22   LILLIANA Sextonmer 15 GM/60ML suspension Take by mouth Takes as a shake on dialysis days Tues-Thurs_Sat  12/10/18   Historical Provider, MD   levothyroxine 50 mcg tablet Take 50 mcg by mouth daily    Historical Provider, MD   lidocaine (LIDODERM) 5 %  4/20/22   Historical Provider, MD   lidocaine (LIDODERM) 5 % Apply 1 patch topically daily Remove & Discard patch within 12 hours or as directed by MD 8/5/22   Gabino Winters DO   loratadine (CLARITIN) 10 mg tablet Take 10 mg by mouth daily    Historical Provider, MD   melatonin 3 mg Take 1 tablet (3 mg total) by mouth daily at bedtime 11/13/21   Zoran Mccann MD   NIFEdipine (ADALAT CC) 30 MG 24 hr tablet Take 1 tablet (30 mg total) by mouth daily 6/27/21   Zoran Mccann MD   NOVOLOG FLEXPEN 100 units/mL SOPN INJECT 1 TO 5 UNITS SUBCUTANEOUSLY THREE TIMES A DAY BEFORE MELAS AS PER SLIDING SCALE 4/30/20   Historical Provider, MD   nystatin (MYCOSTATIN) powder Apply topically 2 (two) times a day 8/31/19   Misael Jay MD   ondansetron (ZOFRAN-ODT) 8 mg disintegrating tablet  4/27/22   Historical Provider, MD   oxyCODONE-acetaminophen (PERCOCET) 5-325 mg per tablet Take 1 tablet by mouth every 6 (six) hours as needed for moderate pain for up to 10 days Max Daily Amount: 4 tablets 8/4/22 8/14/22  Gabino Winters DO   pantoprazole (PROTONIX) 40 mg tablet take 1 tablet by mouth twice a day 10/11/21   Historical Provider, MD   pregabalin (LYRICA) 50 mg capsule Take 1 PO daily, take 1 PO additionally directly have HD on HD days 7/25/22   Drake Carlos DO   senna (SENOKOT) 8 6 mg Take 2 tablets (17 2 mg total) by mouth daily at bedtime as needed for constipation 2/21/20   LOIS Ramos   sertraline (ZOLOFT) 50 mg tablet Take 1 tablet (50 mg total) by mouth daily  Patient taking differently: Take 75 mg by mouth in the morning  4/28/19   LOIS Okeefe   sevelamer (RENAGEL) 800 mg tablet Take 2 tablets (1,600 mg total) by mouth 3 (three) times a day with meals 11/13/21   Nanette Cosby MD   Sevelamer Carbonate 2 4 g PACK STIR 1 PACKET INTO FOOD 3 TIMES A DAY WITH MEALS 7/15/22   Historical Provider, MD   torsemide (DEMADEX) 100 mg tablet Take 1 tablet (100 mg total) by mouth 4 (four) times a week On Sunday, Monday, Wednesday, Friday  Patient taking differently: Take 100 mg by mouth daily On Sunday, Monday, Wednesday, Friday (pt states she takes it everyday) 11/13/21   Nanette Cosby MD   urea (CARMOL) 40 % APPLY TO AFFECTED AREA TOPICALLY EVERY DAY 2/18/22   Marycruz Ritter DPM   warfarin (COUMADIN) 3 mg tablet Take 3 tablets (9 mg total) by mouth daily Takes 9 mg Monday Sat 6/11/21   LOIS Nair   methocarbamol (ROBAXIN) 500 mg tablet Take 1 tablet (500 mg total) by mouth 2 (two) times a day 6/11/22 7/29/22  Duke Oliver DO   zolpidem (AMBIEN) 5 mg tablet Take 5 mg by mouth daily at bedtime 3/15/22 7/29/22  Historical Provider, MD HAMMOND have reviewed home medications with patient personally  /reviewed from previous admission    Allergies:    Allergies   Allergen Reactions    Iodinated Diagnostic Agents Itching    Keflex [Cephalexin] Hives    Eggs Or Egg-Derived Products - Food Allergy Rash    Wound Dressing Adhesive Rash       Social History:  Marital Status: Single   Occupation:  On disability  Patient Pre-hospital Living Situation: Home  Patient Pre-hospital Level of Mobility: walks  Patient Pre-hospital Diet Restrictions:   Substance Use History:   Social History     Substance and Sexual Activity   Alcohol Use Never    Alcohol/week: 0 0 standard drinks     Social History     Tobacco Use   Smoking Status Never Smoker   Smokeless Tobacco Never Used     Social History     Substance and Sexual Activity   Drug Use No       Family History:  Family History   Problem Relation Age of Onset    Heart disease Family     Diabetes Family     Heart disease Mother     Cancer Brother         neck    Throat cancer Brother     Ovarian cancer Maternal Aunt 40       Physical Exam:     Vitals:   Blood Pressure: 130/87 (08/12/22 1859)  Pulse: 74 (08/12/22 1859)  Temperature: 97 9 °F (36 6 °C) (08/12/22 1859)  Temp Source: Oral (08/12/22 1147)  Respirations: 20 (08/12/22 1147)  Weight - Scale: 127 kg (280 lb) (08/12/22 1147)  SpO2: 98 % (08/12/22 1859)    Physical Exam  Constitutional:       General: She is not in acute distress  Appearance: She is obese  She is not ill-appearing  HENT:      Head: Normocephalic  Nose: Nose normal    Eyes:      General: No scleral icterus  Cardiovascular:      Rate and Rhythm: Normal rate  Pulmonary:      Comments: Decreased breath sounds bilateral  Abdominal:      General: Abdomen is flat  There is no distension  Musculoskeletal:         General: Tenderness present  Comments: Left upper extremity AV fistula  Left lower extremity status post TMA  Dorsum of right hand with erythema and swelling  No definite drainage or fluctuance noted   Skin:     General: Skin is warm  Neurological:      General: No focal deficit present  Mental Status: She is alert            Additional Data:     Lab Results:  Results from last 7 days   Lab Units 08/12/22  1515   WBC Thousand/uL 8 21   HEMOGLOBIN g/dL 8 1*   HEMATOCRIT % 25 6*   PLATELETS Thousands/uL 206   NEUTROS PCT % 74   LYMPHS PCT % 14   MONOS PCT % 9   EOS PCT % 1     Results from last 7 days   Lab Units 08/12/22  1515   SODIUM mmol/L 135   POTASSIUM mmol/L 5 3   CHLORIDE mmol/L 99   CO2 mmol/L 29   BUN mg/dL 33*   CREATININE mg/dL 7 06*   ANION GAP mmol/L 7   CALCIUM mg/dL 6 8*   GLUCOSE RANDOM mg/dL 102     Results from last 7 days   Lab Units 08/12/22  1726   INR  1 15                   Imaging: Reviewed radiology reports from this admission including: CT scan  CT lower extremity wo contrast right   Final Result by Tena Xiao MD (08/12 1412)   1  No acute osseous abnormality  2   Mild degenerative changes the right hip and lumbar spine  3   Mild circumferential bladder wall thickening may represent cystitis versus underdistention  Clinical and laboratory correlation are recommended  Workstation performed: [id number]             EKG and Other Studies Reviewed on Admission:   EKG- no new EKG  ** Please Note: This note has been constructed using a voice recognition system   **

## 2022-08-13 NOTE — ASSESSMENT & PLAN NOTE
Lab Results   Component Value Date    EGFR 5 08/12/2022    EGFR 6 08/04/2022    EGFR 7 08/02/2022    CREATININE 7 06 (H) 08/12/2022    CREATININE 6 88 (H) 08/04/2022    CREATININE 5 70 (H) 08/02/2022     Patient gets dialysis on Tuesday Thursday Saturday      Continue with Nephrocaps and sensipar and Renagel  Nephrology consulted

## 2022-08-13 NOTE — PROGRESS NOTES
NEPHROLOGY HEMODIALYSIS PROCEDURE NOTE    Seen and examined on hemodialysis  Appears somewhat disoriented although awakens with stimuli, answers simple questions appropriately  Complains of lower back discomfort  Does not appear short of breath      QB: 400  QD: 500  Access:AV access  Dialyzer: 180  Projected Kt/V:-  Sodium: 138  Potassium: 2  Bicarbonate: 35  Ultrafiltration: limited  Medications given on HD: -    Physical Exam:    BP (!) 183/87   Pulse 68   Temp 97 8 °F (36 6 °C) (Oral)   Resp 15   Wt 127 kg (280 lb)   LMP 02/12/2016 (Exact Date)   SpO2 98%   BMI 45 19 kg/m²     Limited ultrafiltration  Continue with outpatient antihypertensive regimen  Pain management as per primary service

## 2022-08-13 NOTE — PROGRESS NOTES
[facility]  Progress Note Arvind Fritz 1965, 56 y o  female MRN: [id number]  Unit/Bed#: -01 Encounter: [id number]  Primary Care Provider: Adair Desai MD   Date and time admitted to hospital: 8/12/2022 11:43 AM    * [address]  · Patient came to the hospital after had are being 2 falls at home with right-sided hip/back pain that radiates down her R leg  She was recently in hospital with complaint of similar pain   · No acute osseous abnormality on CT scan  · Pain control  · PT OT evaluation    Cellulitis  Assessment & Plan  · Patient was started on antibiotics during the recent hospital stay due to concern for cellulitis of the right wrist region from previous IV site  · Complete PO Doxycycline course     Concern for Osteomyelitis/Discitis of lumbar region  Assessment & Plan  · Patient came to the hospital recently with back pain and was evaluated by Neurosurgery and Infectious Disease  · No plan for any biopsy or surgical intervention    Essential hypertension  Assessment & Plan  · Monitor blood pressure  Continue with outpatient medication  · Continue with Coreg  · Auto substituted to Procardia  · Continue with torsemide  · BP is elevated  Chronic anticoagulation  Assessment & Plan  · Patient is on Coumadin as outpatient  · INR is 1 15 on admission   · Patient reported that she did not take Coumadin previous day   · On 9 mg of Coumadin  Will continue with 10 mg and monitor INR    Chronic pain syndrome  Assessment & Plan  · Followed by pain management as outpatient    Pulmonary embolus [facility])  Assessment & Plan  · Patient with previous history of PE  Subtherapeutic INR today     · Questionable compliance  · Repeat INR is pending     Acquired hypothyroidism  Assessment & Plan  · Continue Synthroid    Type 2 diabetes mellitus [facility])  Assessment & Plan  Lab Results   Component Value Date    HGBA1C 9 4 (H) 07/09/2022       Recent Labs 08/12/22 2005 08/13/22  0806   POCGLU 94 153*       Blood Sugar Average: Last 72 hrs:  (P) 123 5     · continue with Lantus and sliding scale at a lower dose  · Monitor blood sugar  · Patient with poor p o  Intake, SO reports that when she eats she gets an upset stomach/vomits frequently  Recommend that she have GI evaluation for gastric emptying study as an outpt to evaluate for gastroparesis -- she was recommended this by GI at EGD 11/2021     Hyperlipidemia  Assessment & Plan  · Continue with statin    Morbid obesity  Assessment & Plan  · TLC as outpatient    Anemia of chronic disease  Assessment & Plan  · Hemoglobin at baseline  · Monitor    Secondary hyperparathyroidism of renal origin [facility])  Assessment & Plan  · Continue Sensipar    Ambulatory dysfunction  Assessment & Plan  · Patient came to the hospital after 2 falls at home  She had a fall on 08/04 and had another fall as today  Both the falls when she was trying to sit down and slid down to the floor  · PT OT evaluation  · May need rehab    Anxiety disorder  Assessment & Plan  · Continue with p r n  Xanax    ESRD on hemodialysis  Assessment & Plan  Lab Results   Component Value Date    EGFR 5 08/12/2022    EGFR 6 08/04/2022    EGFR 7 08/02/2022    CREATININE 7 06 (H) 08/12/2022    CREATININE 6 88 (H) 08/04/2022    CREATININE 5 70 (H) 08/02/2022     Patient gets dialysis on Tuesday Thursday Saturday  Continue with Nephrocaps and sensipar and Renagel  Nephrology consulted     Essential hypertension  Assessment & Plan  · Monitor blood pressure          VTE Pharmacologic Prophylaxis: VTE Score: 8 High Risk (Score >/= 5) - Pharmacological DVT Prophylaxis Ordered: warfarin (Coumadin)  Sequential Compression Devices Ordered  Patient Centered Rounds: I performed bedside rounds with nursing staff today    Discussions with Specialists or Other Care Team Provider: RN    Education and Discussions with Family / Patient: Updated  (significant other) via phone  Time Spent for Care: 30 minutes  More than 50% of total time spent on counseling and coordination of care as described above  Current Length of Stay: 0 day(s)  Current Patient Status: Observation   Certification Statement: The patient, admitted on an observation basis, will now require > 2 midnight hospital stay due to pain control, BP control, PT/OT evals for safe discharge  Discharge Plan: Anticipate discharge in 24-48 hrs to home  Code Status: Level 1 - Full Code    Subjective:   Patient was seen on HD  She reports she feels generally unwell  She states she is fatigued, has low RT back/hip pain that radiates down her R leg into her knee  She states she has had no appetite for 3 days  In talking with her SO over phone, he notes that she frequently has an upset stomach when she eats and vomits  Objective:     Vitals:   Temp (24hrs), Av 8 °F (36 6 °C), Min:97 4 °F (36 3 °C), Max:98 4 °F (36 9 °C)    Temp:  [97 4 °F (36 3 °C)-98 4 °F (36 9 °C)] 97 8 °F (36 6 °C)  HR:  [68-78] 68  Resp:  [15-20] 15  BP: (130-217)/(49-87) 183/87  SpO2:  [96 %-99 %] 98 %  Body mass index is 45 19 kg/m²  Input and Output Summary (last 24 hours): Intake/Output Summary (Last 24 hours) at 2022 1030  Last data filed at 2022 0955  Gross per 24 hour   Intake 200 ml   Output --   Net 200 ml       Physical Exam:   Physical Exam  Vitals reviewed  Constitutional:       General: She is not in acute distress  Comments: Chronically ill appearing   HENT:      Head: Normocephalic and atraumatic  Eyes:      Extraocular Movements: Extraocular movements intact  Cardiovascular:      Rate and Rhythm: Normal rate and regular rhythm  Pulmonary:      Effort: Pulmonary effort is normal  No respiratory distress  Breath sounds: Normal breath sounds  Abdominal:      General: Bowel sounds are normal  There is no distension  Palpations: Abdomen is soft  Tenderness:  There is no abdominal tenderness  Musculoskeletal:         General: Normal range of motion  Cervical back: Normal range of motion  Neurological:      General: No focal deficit present  Mental Status: She is alert and oriented to person, place, and time  Psychiatric:         Behavior: Behavior normal          Thought Content: Thought content normal       Comments: flat          Additional Data:     Labs:  Results from last 7 days   Lab Units 08/12/22  1515   WBC Thousand/uL 8 21   HEMOGLOBIN g/dL 8 1*   HEMATOCRIT % 25 6*   PLATELETS Thousands/uL 206   NEUTROS PCT % 74   LYMPHS PCT % 14   MONOS PCT % 9   EOS PCT % 1     Results from last 7 days   Lab Units 08/12/22  1515   SODIUM mmol/L 135   POTASSIUM mmol/L 5 3   CHLORIDE mmol/L 99   CO2 mmol/L 29   BUN mg/dL 33*   CREATININE mg/dL 7 06*   ANION GAP mmol/L 7   CALCIUM mg/dL 6 8*   GLUCOSE RANDOM mg/dL 102     Results from last 7 days   Lab Units 08/12/22  1726   INR  1 15     Results from last 7 days   Lab Units 08/13/22  0806 08/12/22  2005   POC GLUCOSE mg/dl 153* 94               Lines/Drains:  Invasive Devices  Report    Peripheral Intravenous Line  Duration           Peripheral IV 08/12/22 Right Forearm <1 day          Line  Duration           Hemodialysis AV Fistula 02/20/18 Left Forearm 1634 days                      Imaging: No pertinent imaging reviewed      Recent Cultures (last 7 days):         Last 24 Hours Medication List:   Current Facility-Administered Medications   Medication Dose Route Frequency Provider Last Rate    acetaminophen  650 mg Oral Q6H PRN Nakul Hassan MD      albuterol  2 puff Inhalation Q6H PRN Nakul Hassan MD      ALPRAZolam  0 25 mg Oral BID PRN Nakul Hassan MD      atorvastatin  20 mg Oral Daily With Joaquin Jacome MD      b complex-vitamin C-folic acid  1 capsule Oral Daily With Joaquin Jacome MD      carvedilol  25 mg Oral BID Nakul Hassan MD      dicyclomine  10 mg Oral TID PRN Nakul Hassan MD  doxycycline hyclate  100 mg Oral Q12H [address] Nakul Hassan, MD      HYDROmorphone  0 5 mg Intravenous Q3H PRN Jeanne Verdin PA-C      insulin glargine  8 Units Subcutaneous HS Nakul Hassan, MD      insulin lispro  1-5 Units Subcutaneous TID ROXANNE [facility] Nakul Hassan, MD      insulin lispro  1-5 Units Subcutaneous HS Nakul Hassan, MD      insulin lispro  3 Units Subcutaneous TID With Meals Nakul Hassan, MD      levothyroxine  50 mcg Oral Early Morning [facility]      lidocaine  1 patch Topical Daily [facility]      loratadine  10 mg Oral Daily Mota Shouts, MD      melatonin  3 mg Oral HS [facility]      menthol-methyl salicylate   Apply externally 4x Daily PRN Mauricio Rosales PA-C      NIFEdipine  30 mg Oral Daily [facility]      nystatin   Topical BID [facility]      ondansetron  4 mg Intravenous Q6H PRN Mota Sonya, MD      oxyCODONE-acetaminophen  1 tablet Oral Q6H PRN Mota Sonya, MD      oxyCODONE-acetaminophen  2 tablet Oral Q6H PRN Jeanne Verdin PA-C      pantoprazole  40 mg Oral Early Morning [facility]      polyethylene glycol  17 g Oral Daily [facility]      pregabalin  50 mg Oral Daily [facility]      senna  2 tablet Oral HS PRN Mota Charleneuts, MD      senna  1 tablet Oral Daily [facility]      sertraline  75 mg Oral Daily [facility]      sevelamer  1,600 mg Oral TID With Meals Mota Charleneuts, MD Mel Melo warfarin  10 mg Oral Daily (warfarin) Jeanne Verdin PA-C          Today, Patient Was Seen By: Jeanne Verdin PA-C    **Please Note: This note may have been constructed using a voice recognition system  **

## 2022-08-13 NOTE — ASSESSMENT & PLAN NOTE
· Patient came to the hospital after had are being 2 falls at home with right-sided hip/back pain that radiates down her R leg    She was recently in hospital with complaint of similar pain   · No acute osseous abnormality on CT scan  · Pain control  · PT OT evaluation

## 2022-08-13 NOTE — HEMODIALYSIS
Post-Dialysis RN Treatment Note    Blood Pressure:  Pre 144/49  mm/Hg  Post 144/109 mmHg   EDW  135 5 kg    Weight:  Pre 121 5 kg   Post 121 8 kg   Mode of weight measurement: Bed Scale   Volume Removed  179 ml    Treatment duration 72 minutes    NS given  No    Treatment shortened? Yes, describe: pt very anxious throughout tx, stating "I'm scared I'm gonig to get worse on dialysis " Pt refused PRN Xanex, stating she didn't think it would work, attempted distraction and conversation, pt ultimately requested to end tx early  Dr Hodge aware   Medications given during Rx None Reported   Estimated Kt/V  0 55   Access type: AV fistula   Access Issues: No    Report called to primary nurse   Yes       3 5 hour HD tx on a 2K acid bath for potassium of 5 3 on 8/12  Current UF goal of 500ml's total to run even per Dr Hodge  Labs drawn at start of tx  Pt drifts off during conversation but is easily aroused   Dr Hodge aware  Plan of care reviewed with nephrologist at the bedside

## 2022-08-13 NOTE — ASSESSMENT & PLAN NOTE
· Patient was started on antibiotics during the recent hospital stay due to concern for cellulitis of the right wrist region from previous IV site  · Complete PO Doxycycline course

## 2022-08-13 NOTE — ASSESSMENT & PLAN NOTE
· Patient with previous history of PE  Subtherapeutic INR today     · Questionable compliance  · Repeat INR is pending

## 2022-08-13 NOTE — PLAN OF CARE
Problem: MOBILITY - ADULT  Goal: Maintain or return to baseline ADL function  Description: INTERVENTIONS:  -  Assess patient's ability to carry out ADLs; assess patient's baseline for ADL function and identify physical deficits which impact ability to perform ADLs (bathing, care of mouth/teeth, toileting, grooming, dressing, etc )  - Assess/evaluate cause of self-care deficits   - Assess range of motion  - Assess patient's mobility; develop plan if impaired  - Assess patient's need for assistive devices and provide as appropriate  - Encourage maximum independence but intervene and supervise when necessary  - Involve family in performance of ADLs  - Assess for home care needs following discharge   - Consider OT consult to assist with ADL evaluation and planning for discharge  - Provide patient education as appropriate  Outcome: Progressing  Goal: Maintains/Returns to pre admission functional level  Description: INTERVENTIONS:  - Perform BMAT or MOVE assessment daily    - Set and communicate daily mobility goal to care team and patient/family/caregiver     - Collaborate with rehabilitation services on mobility goals if consulted  - Out of bed for toileting  - Record patient progress and toleration of activity level   Outcome: Progressing     Problem: PAIN - ADULT  Goal: Verbalizes/displays adequate comfort level or baseline comfort level  Description: Interventions:  - Encourage patient to monitor pain and request assistance  - Assess pain using appropriate pain scale  - Administer analgesics based on type and severity of pain and evaluate response  - Implement non-pharmacological measures as appropriate and evaluate response  - Consider cultural and social influences on pain and pain management  - Notify physician/advanced practitioner if interventions unsuccessful or patient reports new pain  Outcome: Progressing     Problem: INFECTION - ADULT  Goal: Absence or prevention of progression during hospitalization  Description: INTERVENTIONS:  - Assess and monitor for signs and symptoms of infection  - Monitor lab/diagnostic results  - Monitor all insertion sites, i e  indwelling lines, tubes, and drains  - Monitor endotracheal if appropriate and nasal secretions for changes in amount and color  - [place] appropriate cooling/warming therapies per order  - Administer medications as ordered  - Instruct and encourage patient and family to use good hand hygiene technique  - Identify and instruct in appropriate isolation precautions for identified infection/condition  Outcome: Progressing  Goal: Absence of fever/infection during neutropenic period  Description: INTERVENTIONS:  - Monitor WBC    Outcome: Progressing     Problem: SAFETY ADULT  Goal: Maintain or return to baseline ADL function  Description: INTERVENTIONS:  -  Assess patient's ability to carry out ADLs; assess patient's baseline for ADL function and identify physical deficits which impact ability to perform ADLs (bathing, care of mouth/teeth, toileting, grooming, dressing, etc )  - Assess/evaluate cause of self-care deficits   - Assess range of motion  - Assess patient's mobility; develop plan if impaired  - Assess patient's need for assistive devices and provide as appropriate  - Encourage maximum independence but intervene and supervise when necessary  - Involve family in performance of ADLs  - Assess for home care needs following discharge   - Consider OT consult to assist with ADL evaluation and planning for discharge  - Provide patient education as appropriate  Outcome: Progressing  Goal: Maintains/Returns to pre admission functional level  Description: INTERVENTIONS:  - Perform BMAT or MOVE assessment daily    - Set and communicate daily mobility goal to care team and patient/family/caregiver     - Collaborate with rehabilitation services on mobility goals if consulted  - Out of bed for toileting  - Record patient progress and toleration of activity level   Outcome: Progressing  Goal: Patient will remain free of falls  Description: INTERVENTIONS:  - Educate patient/family on patient safety including physical limitations  - Instruct patient to call for assistance with activity   - Consult OT/PT to assist with strengthening/mobility   - Keep Call bell within reach  - Keep bed low and locked with side rails adjusted as appropriate  - Keep care items and personal belongings within reach  - Initiate and maintain comfort rounds  - Make Fall Risk Sign visible to staff  - Apply yellow socks and bracelet for high fall risk patients  - Consider moving patient to room near nurses station  Outcome: Progressing     Problem: DISCHARGE PLANNING  Goal: Discharge to home or other facility with appropriate resources  Description: INTERVENTIONS:  - Identify barriers to discharge w/patient and caregiver  - Arrange for needed discharge resources and transportation as appropriate  - Identify discharge learning needs (meds, wound care, etc )  - Arrange for interpretive services to assist at discharge as needed  - Refer to Case Management Department for coordinating discharge planning if the patient needs post-hospital services based on physician/advanced practitioner order or complex needs related to functional status, cognitive ability, or social support system  Outcome: Progressing     Problem: Knowledge Deficit  Goal: Patient/family/caregiver demonstrates understanding of disease process, treatment plan, medications, and discharge instructions  Description: Complete learning assessment and assess knowledge base    Interventions:  - Provide teaching at level of understanding  - Provide teaching via preferred learning methods  Outcome: Progressing     Problem: Potential for Falls  Goal: Patient will remain free of falls  Description: INTERVENTIONS:  - Educate patient/family on patient safety including physical limitations  - Instruct patient to call for assistance with activity   - Consult OT/PT to assist with strengthening/mobility   - Keep Call bell within reach  - Keep bed low and locked with side rails adjusted as appropriate  - Keep care items and personal belongings within reach  - Initiate and maintain comfort rounds  - Make Fall Risk Sign visible to staff  - Apply yellow socks and bracelet for high fall risk patients  - Consider moving patient to room near nurses station  Outcome: Progressing     Problem: Prexisting or High Potential for Compromised Skin Integrity  Goal: Skin integrity is maintained or improved  Description: INTERVENTIONS:  - Identify patients at risk for skin breakdown  - Assess and monitor skin integrity  - Assess and monitor nutrition and hydration status  - Monitor labs   - Assess for incontinence   - Turn and reposition patient  - Assist with mobility/ambulation  - Relieve pressure over bony prominences  - Avoid friction and shearing  - Provide appropriate hygiene as needed including keeping skin clean and dry  - Evaluate need for skin moisturizer/barrier cream  - Collaborate with interdisciplinary team   - Patient/family teaching  - Consider wound care consult   Outcome: Progressing

## 2022-08-13 NOTE — ASSESSMENT & PLAN NOTE
· Patient is on Coumadin as outpatient  · INR is 1 15 on admission   · Patient reported that she did not take Coumadin previous day   · On 9 mg of Coumadin    Will continue with 10 mg and monitor INR

## 2022-08-13 NOTE — PLAN OF CARE
Problem: PAIN - ADULT  Goal: Verbalizes/displays adequate comfort level or baseline comfort level  Description: Interventions:  - Encourage patient to monitor pain and request assistance  - Assess pain using appropriate pain scale  - Administer analgesics based on type and severity of pain and evaluate response  - Implement non-pharmacological measures as appropriate and evaluate response  - Consider cultural and social influences on pain and pain management  - Notify physician/advanced practitioner if interventions unsuccessful or patient reports new pain  Outcome: Progressing     Problem: SAFETY ADULT  Goal: Patient will remain free of falls  Description: INTERVENTIONS:  - Educate patient/family on patient safety including physical limitations  - Instruct patient to call for assistance with activity   - Consult OT/PT to assist with strengthening/mobility   - Keep Call bell within reach  - Keep bed low and locked with side rails adjusted as appropriate  - Keep care items and personal belongings within reach  - Initiate and maintain comfort rounds  - Make Fall Risk Sign visible to staff  - Offer Toileting every Hours, in advance of need  - Initiate/Maintain alarm  - Obtain necessary fall risk management equipment:   - Apply yellow socks and bracelet for high fall risk patients  - Consider moving patient to room near nurses station  Outcome: Progressing     Problem: Knowledge Deficit  Goal: Patient/family/caregiver demonstrates understanding of disease process, treatment plan, medications, and discharge instructions  Description: Complete learning assessment and assess knowledge base    Interventions:  - Provide teaching at level of understanding  - Provide teaching via preferred learning methods  Outcome: Progressing

## 2022-08-13 NOTE — ASSESSMENT & PLAN NOTE
· Monitor blood pressure  Continue with outpatient medication  · Continue with Coreg  · Auto substituted to Procardia  · Continue with torsemide  · BP is elevated

## 2022-08-14 ENCOUNTER — APPOINTMENT (INPATIENT)
Dept: RADIOLOGY | Facility: HOSPITAL | Age: 57
DRG: 539 | End: 2022-08-14
Payer: MEDICARE

## 2022-08-14 LAB
GLUCOSE SERPL-MCNC: 111 MG/DL (ref 65–140)
GLUCOSE SERPL-MCNC: 88 MG/DL (ref 65–140)
GLUCOSE SERPL-MCNC: 91 MG/DL (ref 65–140)
GLUCOSE SERPL-MCNC: 93 MG/DL (ref 65–140)
INR PPP: 1.04 (ref 0.84–1.19)
PROTHROMBIN TIME: 13.8 SECONDS (ref 11.6–14.5)

## 2022-08-14 PROCEDURE — 99232 SBSQ HOSP IP/OBS MODERATE 35: CPT | Performed by: PHYSICIAN ASSISTANT

## 2022-08-14 PROCEDURE — 97112 NEUROMUSCULAR REEDUCATION: CPT

## 2022-08-14 PROCEDURE — 99233 SBSQ HOSP IP/OBS HIGH 50: CPT | Performed by: INTERNAL MEDICINE

## 2022-08-14 PROCEDURE — 97530 THERAPEUTIC ACTIVITIES: CPT

## 2022-08-14 PROCEDURE — 85610 PROTHROMBIN TIME: CPT | Performed by: FAMILY MEDICINE

## 2022-08-14 PROCEDURE — 82948 REAGENT STRIP/BLOOD GLUCOSE: CPT

## 2022-08-14 PROCEDURE — 74018 RADEX ABDOMEN 1 VIEW: CPT

## 2022-08-14 RX ORDER — METOCLOPRAMIDE HYDROCHLORIDE 5 MG/ML
5 INJECTION INTRAMUSCULAR; INTRAVENOUS EVERY 6 HOURS PRN
Status: DISCONTINUED | OUTPATIENT
Start: 2022-08-14 | End: 2022-08-21 | Stop reason: HOSPADM

## 2022-08-14 RX ADMIN — LEVOTHYROXINE SODIUM 50 MCG: 75 TABLET ORAL at 05:30

## 2022-08-14 RX ADMIN — NIFEDIPINE 30 MG: 30 TABLET, FILM COATED, EXTENDED RELEASE ORAL at 08:45

## 2022-08-14 RX ADMIN — DOXYCYCLINE 100 MG: 100 CAPSULE ORAL at 08:44

## 2022-08-14 RX ADMIN — SENNOSIDES 8.6 MG: 8.6 TABLET, FILM COATED ORAL at 08:44

## 2022-08-14 RX ADMIN — POLYETHYLENE GLYCOL 3350 17 G: 17 POWDER, FOR SOLUTION ORAL at 08:45

## 2022-08-14 RX ADMIN — OXYCODONE HYDROCHLORIDE AND ACETAMINOPHEN 2 TABLET: 5; 325 TABLET ORAL at 05:30

## 2022-08-14 RX ADMIN — NYSTATIN: 100000 POWDER TOPICAL at 08:45

## 2022-08-14 RX ADMIN — LORATADINE 10 MG: 10 TABLET ORAL at 08:45

## 2022-08-14 RX ADMIN — NYSTATIN: 100000 POWDER TOPICAL at 16:46

## 2022-08-14 RX ADMIN — PANTOPRAZOLE SODIUM 40 MG: 40 TABLET, DELAYED RELEASE ORAL at 05:30

## 2022-08-14 RX ADMIN — CARVEDILOL 25 MG: 25 TABLET, FILM COATED ORAL at 08:44

## 2022-08-14 RX ADMIN — OXYCODONE HYDROCHLORIDE AND ACETAMINOPHEN 2 TABLET: 5; 325 TABLET ORAL at 21:21

## 2022-08-14 RX ADMIN — SEVELAMER HYDROCHLORIDE 1600 MG: 800 TABLET ORAL at 08:44

## 2022-08-14 RX ADMIN — NEPHROCAP 1 CAPSULE: 1 CAP ORAL at 16:45

## 2022-08-14 RX ADMIN — SEVELAMER HYDROCHLORIDE 1600 MG: 800 TABLET ORAL at 16:45

## 2022-08-14 RX ADMIN — WARFARIN SODIUM 10 MG: 5 TABLET ORAL at 16:46

## 2022-08-14 RX ADMIN — OXYCODONE HYDROCHLORIDE AND ACETAMINOPHEN 1 TABLET: 5; 325 TABLET ORAL at 08:51

## 2022-08-14 RX ADMIN — SERTRALINE HYDROCHLORIDE 75 MG: 50 TABLET ORAL at 08:44

## 2022-08-14 RX ADMIN — ATORVASTATIN CALCIUM 20 MG: 20 TABLET, FILM COATED ORAL at 16:46

## 2022-08-14 RX ADMIN — MELATONIN 3 MG: at 21:16

## 2022-08-14 RX ADMIN — INSULIN GLARGINE 8 UNITS: 100 INJECTION, SOLUTION SUBCUTANEOUS at 21:16

## 2022-08-14 RX ADMIN — ONDANSETRON HYDROCHLORIDE 4 MG: 2 INJECTION, SOLUTION INTRAMUSCULAR; INTRAVENOUS at 05:35

## 2022-08-14 RX ADMIN — HYDROMORPHONE HYDROCHLORIDE 0.5 MG: 1 INJECTION, SOLUTION INTRAMUSCULAR; INTRAVENOUS; SUBCUTANEOUS at 18:47

## 2022-08-14 RX ADMIN — HYDROMORPHONE HYDROCHLORIDE 0.5 MG: 1 INJECTION, SOLUTION INTRAMUSCULAR; INTRAVENOUS; SUBCUTANEOUS at 13:35

## 2022-08-14 RX ADMIN — LIDOCAINE 5% 1 PATCH: 700 PATCH TOPICAL at 08:45

## 2022-08-14 RX ADMIN — CARVEDILOL 25 MG: 25 TABLET, FILM COATED ORAL at 16:45

## 2022-08-14 RX ADMIN — SEVELAMER HYDROCHLORIDE 1600 MG: 800 TABLET ORAL at 11:11

## 2022-08-14 RX ADMIN — PREGABALIN 50 MG: 50 CAPSULE ORAL at 08:44

## 2022-08-14 NOTE — ASSESSMENT & PLAN NOTE
· Patient came to the hospital after had are being 2 falls at home with right-sided hip/back pain that radiates down her R leg    She was recently in hospital with complaint of similar pain   · No acute osseous abnormality on CT scan  · Pain control  · PT OT evaluation -- recommending rehab

## 2022-08-14 NOTE — ASSESSMENT & PLAN NOTE
· Pt and SO report persistent nausea and vomiting for "a while "  Pt was recommended to have gastric emptying study after EGD in November of 2021, this was not done   · Recommend she be evaluated for suspected gastroparesis  · PRN antiemetic  · GI evaluation if symptoms do not improve and she does not tolerate PO  · Check KUB, pt reports to constipation (had hard BM yesterday) and abdominal pain

## 2022-08-14 NOTE — PROGRESS NOTES
[facility]  Progress Note Richard Conteh 1965, 56 y o  female MRN: [id number]  Unit/Bed#: -01 Encounter: [id number]  Primary Care Provider: Anamaria Castillo MD   Date and time admitted to hospital: 8/12/2022 11:43 AM    * [address]  · Patient came to the hospital after had are being 2 falls at home with right-sided hip/back pain that radiates down her R leg  She was recently in hospital with complaint of similar pain   · No acute osseous abnormality on CT scan  · Pain control  · PT OT evaluation -- recommending rehab     Cellulitis  Assessment & Plan  · Patient was started on antibiotics during the recent hospital stay due to concern for cellulitis of the right wrist region from previous IV site  · Complete PO Doxycycline course 8/14    Concern for Osteomyelitis/Discitis of lumbar region  Assessment & Plan  · Patient came to the hospital recently with back pain and was evaluated by Neurosurgery and Infectious Disease  · No plan for any biopsy or surgical intervention    Essential hypertension  Assessment & Plan  · Monitor blood pressure  Continue with outpatient medication  · Continue with Coreg  · Auto substituted to Procardia  · Continue with torsemide  · BP is elevated  One time reading of low BP, do not believe this was accurate  Pt was asymptomatic     Chronic anticoagulation  Assessment & Plan  · Patient is on Coumadin as outpatient  · Patient reported that she did not take Coumadin previous day of admission   · On 9 mg of Coumadin  INR is subtherapeutic  Continue 10 mg daily with daily INR     Chronic pain syndrome  Assessment & Plan  · Followed by pain management as outpatient    Pulmonary embolus [facility])  Assessment & Plan  · Patient with previous history of PE  Subtherapeutic INR today     · Questionable compliance  · Continue coumadin     Acquired hypothyroidism  Assessment & Plan  · Continue Synthroid    Type 2 diabetes mellitus [facility])  Assessment & Plan  Lab Results   Component Value Date    HGBA1C 9 4 (H) 07/09/2022       Recent Labs     08/13/22  1235 08/13/22  1729 08/13/22  2135 08/14/22  0631   POCGLU 127 133 117 91       Blood Sugar Average: Last 72 hrs:  (P) 253 [id number]     · continue with Lantus and sliding scale at a lower dose  · Monitor blood sugar  · Patient with poor p o  Intake, SO reports that when she eats she gets an upset stomach/vomits frequently  Recommend that she have GI evaluation for gastric emptying study as an outpt to evaluate for gastroparesis -- she was recommended this by GI at EGD 11/2021     Hyperlipidemia  Assessment & Plan  · Continue with statin    Morbid obesity  Assessment & Plan  · TLC as outpatient    Anemia of chronic disease  Assessment & Plan  · Hemoglobin at baseline  · Monitor    Secondary hyperparathyroidism of renal origin [facility])  Assessment & Plan  · Continue Sensipar    Nausea and vomiting  Assessment & Plan  · Pt and SO report persistent nausea and vomiting for "a while "  Pt was recommended to have gastric emptying study after EGD in November of 2021, this was not done   · Recommend she be evaluated for suspected gastroparesis  · PRN antiemetic  · GI evaluation if symptoms do not improve and she does not tolerate PO  · Check KUB, pt reports to constipation (had hard BM yesterday) and abdominal pain     Ambulatory dysfunction  Assessment & Plan  · Patient came to the hospital after 2 falls at home  She had a fall on 08/04 and had another fall day of admission  Both the falls when she was trying to sit down and slid down to the floor  · PT OT evaluation recommend rehab     Anxiety disorder  Assessment & Plan  · Continue with p r n   Xanax    ESRD on hemodialysis  Assessment & Plan  Lab Results   Component Value Date    EGFR 4 08/13/2022    EGFR 5 08/12/2022    EGFR 6 08/04/2022    CREATININE 8 49 (H) 08/13/2022    CREATININE 7 06 (H) 08/12/2022    CREATININE 6 88 (H) 08/04/2022 · Patient gets dialysis on   · Continue with Nephrocaps and sensipar and Renagel  · Nephrology consulted     Essential hypertension  Assessment & Plan  · Monitor blood pressure        VTE Pharmacologic Prophylaxis: VTE Score: 8 High Risk (Score >/= 5) - Pharmacological DVT Prophylaxis Ordered: warfarin (Coumadin)  Sequential Compression Devices Ordered  Patient Centered Rounds: I performed bedside rounds with nursing staff today  Discussions with Specialists or Other Care Team Provider: RN    Education and Discussions with Family / Patient: Patient declined call to   Time Spent for Care: 20 minutes  More than 50% of total time spent on counseling and coordination of care as described above  Current Length of Stay: 1 day(s)  Current Patient Status: Inpatient   Certification Statement: The patient will continue to require additional inpatient hospital stay due to n/v, pain, safe d/c plan recommended rehab  Discharge Plan: Anticipate discharge in 24-48 hrs to rehab facility  Code Status: Level 1 - Full Code    Subjective:   Pt reports to persistent pain in RT back radiating to R knee  C/o nausea this morning, feels like she has to throw up but can't  Has had n/v for "a while"     Objective:     Vitals:   Temp (24hrs), Av 4 °F (36 9 °C), Min:97 8 °F (36 6 °C), Max:98 8 °F (37 1 °C)    Temp:  [97 8 °F (36 6 °C)-98 8 °F (37 1 °C)] 98 5 °F (36 9 °C)  HR:  [68-80] 80  Resp:  [15-20] 19  BP: ()/() 145/71  Body mass index is 45 19 kg/m²  Input and Output Summary (last 24 hours): Intake/Output Summary (Last 24 hours) at 2022 0940  Last data filed at 2022 1108  Gross per 24 hour   Intake 500 ml   Output --   Net 500 ml       Physical Exam:   Physical Exam  Vitals reviewed  Constitutional:       Appearance: She is obese  Comments: Chronically ill appearing  Looks uncomfortable    Holding emesis basin   HENT:      Head: Normocephalic and atraumatic  Eyes:      Extraocular Movements: Extraocular movements intact  Cardiovascular:      Rate and Rhythm: Normal rate and regular rhythm  Pulmonary:      Effort: Pulmonary effort is normal    Abdominal:      General: There is no distension  Palpations: Abdomen is soft  Tenderness: There is abdominal tenderness  Comments: Limited exam due to obesity, hypoactive bowel sounds   Musculoskeletal:         General: Normal range of motion  Cervical back: Normal range of motion  Neurological:      General: No focal deficit present  Mental Status: She is alert and oriented to person, place, and time  Psychiatric:         Mood and Affect: Mood is anxious  Thought Content: Thought content normal           Additional Data:     Labs:  Results from last 7 days   Lab Units 08/13/22  0959 08/12/22  1515   WBC Thousand/uL 7 28 8 21   HEMOGLOBIN g/dL 8 6* 8 1*   HEMATOCRIT % 27 6* 25 6*   PLATELETS Thousands/uL 208 206   NEUTROS PCT %  --  74   LYMPHS PCT %  --  14   MONOS PCT %  --  9   EOS PCT %  --  1     Results from last 7 days   Lab Units 08/13/22  0959   SODIUM mmol/L 137   POTASSIUM mmol/L 4 4   CHLORIDE mmol/L 97   CO2 mmol/L 30   BUN mg/dL 40*   CREATININE mg/dL 8 49*   ANION GAP mmol/L 10   CALCIUM mg/dL 9 1   GLUCOSE RANDOM mg/dL 151*     Results from last 7 days   Lab Units 08/14/22  0536   INR  1 04     Results from last 7 days   Lab Units 08/14/22  0631 08/13/22  2135 08/13/22  1729 08/13/22  1235 08/13/22  0806 08/12/22 2005   POC GLUCOSE mg/dl 91 117 133 127 153* 94               Lines/Drains:  Invasive Devices  Report    Peripheral Intravenous Line  Duration           Peripheral IV 08/12/22 Right Forearm 1 day          Line  Duration           Hemodialysis AV Fistula 02/20/18 Left Forearm 1635 days                      Imaging: No pertinent imaging reviewed      Recent Cultures (last 7 days):         Last 24 Hours Medication List:   Current Facility-Administered Medications   Medication Dose Route Frequency Provider Last Rate    acetaminophen  650 mg Oral Q6H PRN Chio Snell, MD      albuterol  2 puff Inhalation Q6H PRN Chio Snell, MD      ALPRAZolam  0 25 mg Oral BID PRN Chio Snell, MD      atorvastatin  20 mg Oral Daily With Marilyn Ortiz MD      b complex-vitamin C-folic acid  1 capsule Oral Daily With Marilyn Ortiz MD      carvedilol  25 mg Oral BID Chio Snell MD      dicyclomine  10 mg Oral TID PRN Chio Snell, MD      doxycycline hyclate  100 mg Oral Q12H Bridget Tobar MD      HYDROmorphone  0 5 mg Intravenous Q3H PRN Jonas Ornelas PA-C      insulin glargine  8 Units Subcutaneous HS Chio Snell MD      insulin lispro  1-5 Units Subcutaneous TID [facility] Chio Snell MD      insulin lispro  1-5 Units Subcutaneous HS Chio Snell MD      insulin lispro  3 Units Subcutaneous TID With Meals Chio Snell MD      levothyroxine  50 mcg Oral Early Morning Chio Snell, MD      lidocaine  1 patch Topical Daily Chio Snell, MD      loratadine  10 mg Oral Daily Chio Snell, MD      melatonin  3 mg Oral HS Chio Snell, MD      menthol-methyl salicylate   Apply externally 4x Daily PRN Joi Rosales PA-C      metoclopramide  5 mg Intravenous Q6H PRN Jonas Ornelas PA-C      NIFEdipine  30 mg Oral Daily Chio Snell, MD      nystatin   Topical BID Chio Snell, MD      oxyCODONE-acetaminophen  1 tablet Oral Q6H PRN Chio Snell, MD      oxyCODONE-acetaminophen  2 tablet Oral Q6H PRN Jonas Ornelas PA-C      pantoprazole  40 mg Oral Early Morning Chio Snell, MD      polyethylene glycol  17 g Oral Daily Chio Snell, MD      pregabalin  50 mg Oral Daily Chio Snell, MD      senna  2 tablet Oral HS PRN Chio Snell, MD      senna  1 tablet Oral Daily Chio Snell, MD      sertraline  75 mg Oral Daily Chio Snell, MD      sevelamer  1,600 mg Oral TID With Meals Zee Rivas MD Becky Tan warfarin  10 mg Oral Daily (warfarin) Val Freire PA-C          Today, Patient Was Seen By: Val Freire PA-C    **Please Note: This note may have been constructed using a voice recognition system  **

## 2022-08-14 NOTE — PLAN OF CARE
Problem: MOBILITY - ADULT  Goal: Maintain or return to baseline ADL function  Description: INTERVENTIONS:  -  Assess patient's ability to carry out ADLs; assess patient's baseline for ADL function and identify physical deficits which impact ability to perform ADLs (bathing, care of mouth/teeth, toileting, grooming, dressing, etc )  - Assess/evaluate cause of self-care deficits   - Assess range of motion  - Assess patient's mobility; develop plan if impaired  - Assess patient's need for assistive devices and provide as appropriate  - Encourage maximum independence but intervene and supervise when necessary  - Involve family in performance of ADLs  - Assess for home care needs following discharge   - Consider OT consult to assist with ADL evaluation and planning for discharge  - Provide patient education as appropriate  Outcome: Progressing  Goal: Maintains/Returns to pre admission functional level  Description: INTERVENTIONS:  - Perform BMAT or MOVE assessment daily    - Set and communicate daily mobility goal to care team and patient/family/caregiver  - Collaborate with rehabilitation services on mobility goals if consulted  - Perform Range of Motion *** times a day  - Reposition patient every *** hours    - Dangle patient *** times a day  - Stand patient *** times a day  - Ambulate patient *** times a day  - Out of bed to chair *** times a day   - Out of bed for meals *** times a day  - Out of bed for toileting  - Record patient progress and toleration of activity level   Outcome: Progressing     Problem: PAIN - ADULT  Goal: Verbalizes/displays adequate comfort level or baseline comfort level  Description: Interventions:  - Encourage patient to monitor pain and request assistance  - Assess pain using appropriate pain scale  - Administer analgesics based on type and severity of pain and evaluate response  - Implement non-pharmacological measures as appropriate and evaluate response  - Consider cultural and social influences on pain and pain management  - Notify physician/advanced practitioner if interventions unsuccessful or patient reports new pain  Outcome: Progressing     Problem: INFECTION - ADULT  Goal: Absence or prevention of progression during hospitalization  Description: INTERVENTIONS:  - Assess and monitor for signs and symptoms of infection  - Monitor lab/diagnostic results  - Monitor all insertion sites, i e  indwelling lines, tubes, and drains  - Monitor endotracheal if appropriate and nasal secretions for changes in amount and color  - [place] appropriate cooling/warming therapies per order  - Administer medications as ordered  - Instruct and encourage patient and family to use good hand hygiene technique  - Identify and instruct in appropriate isolation precautions for identified infection/condition  Outcome: Progressing  Goal: Absence of fever/infection during neutropenic period  Description: INTERVENTIONS:  - Monitor WBC    Outcome: Progressing     Problem: SAFETY ADULT  Goal: Maintain or return to baseline ADL function  Description: INTERVENTIONS:  -  Assess patient's ability to carry out ADLs; assess patient's baseline for ADL function and identify physical deficits which impact ability to perform ADLs (bathing, care of mouth/teeth, toileting, grooming, dressing, etc )  - Assess/evaluate cause of self-care deficits   - Assess range of motion  - Assess patient's mobility; develop plan if impaired  - Assess patient's need for assistive devices and provide as appropriate  - Encourage maximum independence but intervene and supervise when necessary  - Involve family in performance of ADLs  - Assess for home care needs following discharge   - Consider OT consult to assist with ADL evaluation and planning for discharge  - Provide patient education as appropriate  Outcome: Progressing  Goal: Maintains/Returns to pre admission functional level  Description: INTERVENTIONS:  - Perform BMAT or MOVE assessment daily    - Set and communicate daily mobility goal to care team and patient/family/caregiver  - Collaborate with rehabilitation services on mobility goals if consulted  - Perform Range of Motion *** times a day  - Reposition patient every *** hours    - Dangle patient *** times a day  - Stand patient *** times a day  - Ambulate patient *** times a day  - Out of bed to chair *** times a day   - Out of bed for meals *** times a day  - Out of bed for toileting  - Record patient progress and toleration of activity level   Outcome: Progressing  Goal: Patient will remain free of falls  Description: INTERVENTIONS:  - Educate patient/family on patient safety including physical limitations  - Instruct patient to call for assistance with activity   - Consult OT/PT to assist with strengthening/mobility   - Keep Call bell within reach  - Keep bed low and locked with side rails adjusted as appropriate  - Keep care items and personal belongings within reach  - Initiate and maintain comfort rounds  - Make Fall Risk Sign visible to staff  - Offer Toileting every *** Hours, in advance of need  - Initiate/Maintain ***alarm  - Obtain necessary fall risk management equipment: ***  - Apply yellow socks and bracelet for high fall risk patients  - Consider moving patient to room near nurses station  Outcome: Progressing     Problem: DISCHARGE PLANNING  Goal: Discharge to home or other facility with appropriate resources  Description: INTERVENTIONS:  - Identify barriers to discharge w/patient and caregiver  - Arrange for needed discharge resources and transportation as appropriate  - Identify discharge learning needs (meds, wound care, etc )  - Arrange for interpretive services to assist at discharge as needed  - Refer to Case Management Department for coordinating discharge planning if the patient needs post-hospital services based on physician/advanced practitioner order or complex needs related to functional status, cognitive ability, or social support system  Outcome: Progressing     Problem: Knowledge Deficit  Goal: Patient/family/caregiver demonstrates understanding of disease process, treatment plan, medications, and discharge instructions  Description: Complete learning assessment and assess knowledge base    Interventions:  - Provide teaching at level of understanding  - Provide teaching via preferred learning methods  Outcome: Progressing     Problem: Potential for Falls  Goal: Patient will remain free of falls  Description: INTERVENTIONS:  - Educate patient/family on patient safety including physical limitations  - Instruct patient to call for assistance with activity   - Consult OT/PT to assist with strengthening/mobility   - Keep Call bell within reach  - Keep bed low and locked with side rails adjusted as appropriate  - Keep care items and personal belongings within reach  - Initiate and maintain comfort rounds  - Make Fall Risk Sign visible to staff  - Offer Toileting every *** Hours, in advance of need  - Initiate/Maintain ***alarm  - Obtain necessary fall risk management equipment: ***  - Apply yellow socks and bracelet for high fall risk patients  - Consider moving patient to room near nurses station  Outcome: Progressing     Problem: Prexisting or High Potential for Compromised Skin Integrity  Goal: Skin integrity is maintained or improved  Description: INTERVENTIONS:  - Identify patients at risk for skin breakdown  - Assess and monitor skin integrity  - Assess and monitor nutrition and hydration status  - Monitor labs   - Assess for incontinence   - Turn and reposition patient  - Assist with mobility/ambulation  - Relieve pressure over bony prominences  - Avoid friction and shearing  - Provide appropriate hygiene as needed including keeping skin clean and dry  - Evaluate need for skin moisturizer/barrier cream  - Collaborate with interdisciplinary team   - Patient/family teaching  - Consider wound care consult Outcome: Progressing

## 2022-08-14 NOTE — ASSESSMENT & PLAN NOTE
Lab Results   Component Value Date    HGBA1C 9 4 (H) 07/09/2022       Recent Labs     08/13/22  1235 08/13/22  1729 08/13/22  2135 08/14/22  0631   POCGLU 127 133 117 91       Blood Sugar Average: Last 72 hrs:  (P) 786 [id number]     · continue with Lantus and sliding scale at a lower dose  · Monitor blood sugar  · Patient with poor p o  Intake, SO reports that when she eats she gets an upset stomach/vomits frequently    Recommend that she have GI evaluation for gastric emptying study as an outpt to evaluate for gastroparesis -- she was recommended this by GI at EGD 11/2021

## 2022-08-14 NOTE — ASSESSMENT & PLAN NOTE
· Patient is on Coumadin as outpatient  · Patient reported that she did not take Coumadin previous day of admission   · On 9 mg of Coumadin  INR is subtherapeutic    Continue 10 mg daily with daily INR

## 2022-08-14 NOTE — PHYSICAL THERAPY NOTE
Physical Therapy Treatment Note       08/14/22 0940   PT Last Visit   PT Visit Date 08/14/22   Note Type   Note Type Treatment   Pain Assessment   Pain Assessment Tool 0-10   Pain Score 10 - Worst Possible Pain   Pain Location/Orientation   (R > L hip, low back)   Patient's Stated Pain Goal No pain   Hospital Pain Intervention(s) Repositioned   Restrictions/Precautions   Weight Bearing Precautions Per Order No   Other Precautions Pain; Fall Risk;Multiple lines;Telemetry;Cognitive; Impulsive   General   Chart Reviewed Yes   Family/Caregiver Present No   Cognition   Overall Cognitive Status Impaired   Arousal/Participation Responsive   Attention Attends with cues to redirect   Orientation Level Oriented X4   Memory Unable to assess   Following Commands Follows one step commands inconsistently   Subjective   Subjective states she continues to have a great deal of pain  also limited by nausea, frequently dry heaves throughout session  needs consistent encouragement to partiicipate and very slow to perform mobility tasks  Bed Mobility   Rolling R 5  Supervision   Additional items Assist x 1   Rolling L 5  Supervision   Additional items Assist x 1   Supine to Sit (S)  3  Moderate assistance   Additional items (S)  Assist x 1   Sit to Supine 3  Moderate assistance   Additional items Assist x 1   Additional Comments extensive time spent for setup as well as to get into seated position, > 20 min  frequently goes back to supine but slowly progressed to EOB   Transfers   Sit to Stand 3  Moderate assistance   Additional items Assist x 1; Increased time required   Stand to Sit 3  Moderate assistance   Additional items Assist x 1   Additional Comments able to bear approx 30% of wt R LE, a bit more on L   minimal bed clearance  states she can;t do anymore  returned to EOB and increased time spent to reposition in supine     Balance   Static Sitting Fair -   Dynamic Sitting Poor   Static Standing Poor -   Dynamic Standing Poor -   Endurance Deficit   Endurance Deficit Yes   Endurance Deficit Description fatigue, weakness, pain, cog   Activity Tolerance   Activity Tolerance Patient limited by fatigue;Patient limited by pain;Treatment limited secondary to medical complications (Comment)   Nurse Made Aware yes   Assessment   Prognosis Fair   Problem List Decreased strength;Decreased endurance;Decreased mobility; Impaired balance;Decreased coordination;Decreased cognition;Pain;Orthopedic restrictions   Assessment Pt seen for session for setup, rolling, bed mob, time spent EOB, limited transfers, repositioning  Pt cooperative w/ encouragement, limited by nausea, dry heaves, and moderate to severe pain  Needs increased time to accomplish any mobility task  Very limited ability to transfer/bear wt  Limited by pain  continue to recommend rehab at d/c   Goals   Patient Goals to have less pain   STG Expiration Date 08/28/22   Short Term Goal #1 in addition to previously set goals: transfers w/ min A of 1, standing balance to fair - w/ device, ambulate 25-50 ft w/ RW and min A, if needed for d/c home, ambulate 3-7 stairs w/ S   PT Treatment Day 1   Plan   Treatment/Interventions Functional transfer training;LE strengthening/ROM; Elevations; Therapeutic exercise; Endurance training;Patient/family training;Equipment eval/education; Bed mobility;Gait training   Progress Progressing toward goals   PT Frequency 3-5x/wk   Recommendation   PT Discharge Recommendation Post acute rehabilitation services   AM-PAC Basic Mobility Inpatient   Turning in Bed Without Bedrails 4   Lying on Back to Sitting on Edge of Flat Bed 2   Moving Bed to Chair 2   Standing Up From Chair 2   Walk in Room 1   Climb 3-5 Stairs 1   Basic Mobility Inpatient Raw Score 12   Basic Mobility Standardized Score 32 23   Highest Level Of Mobility   JH-HLM Goal 4: Move to chair/commode   JH-HLM Achieved 3: Sit at edge of bed   Ankita Abbott PT, DPT CSRS

## 2022-08-14 NOTE — ASSESSMENT & PLAN NOTE
· Patient with previous history of PE  Subtherapeutic INR today     · Questionable compliance  · Continue coumadin

## 2022-08-14 NOTE — PLAN OF CARE
Problem: PHYSICAL THERAPY ADULT  Goal: Performs mobility at highest level of function for planned discharge setting  See evaluation for individualized goals  Description: Treatment/Interventions: Functional transfer training, LE strengthening/ROM, Therapeutic exercise, Elevations, Endurance training, Patient/family training, Equipment eval/education, Bed mobility, Gait training          See flowsheet documentation for full assessment, interventions and recommendations  Note: Prognosis: Fair  Problem List: Decreased strength, Decreased endurance, Decreased mobility, Impaired balance, Decreased coordination, Decreased cognition, Pain, Orthopedic restrictions  Assessment: Pt seen for session for setup, rolling, bed mob, time spent EOB, limited transfers, repositioning  Pt cooperative w/ encouragement, limited by nausea, dry heaves, and moderate to severe pain  Needs increased time to accomplish any mobility task  Very limited ability to transfer/bear wt  Limited by pain  continue to recommend rehab at d/c        PT Discharge Recommendation: Post acute rehabilitation services    See flowsheet documentation for full assessment

## 2022-08-14 NOTE — PLAN OF CARE
Problem: PAIN - ADULT  Goal: Verbalizes/displays adequate comfort level or baseline comfort level  Description: Interventions:  - Encourage patient to monitor pain and request assistance  - Assess pain using appropriate pain scale  - Administer analgesics based on type and severity of pain and evaluate response  - Implement non-pharmacological measures as appropriate and evaluate response  - Consider cultural and social influences on pain and pain management  - Notify physician/advanced practitioner if interventions unsuccessful or patient reports new pain  Outcome: Progressing     Problem: SAFETY ADULT  Goal: Patient will remain free of falls  Description: INTERVENTIONS:  - Educate patient/family on patient safety including physical limitations  - Instruct patient to call for assistance with activity   - Consult OT/PT to assist with strengthening/mobility   - Keep Call bell within reach  - Keep bed low and locked with side rails adjusted as appropriate  - Keep care items and personal belongings within reach  - Initiate and maintain comfort rounds  - Make Fall Risk Sign visible to staff  - Offer Toileting every  Hours, in advance of need  - Initiate/Maintain alarm  - Obtain necessary fall risk management equipment:   - Apply yellow socks and bracelet for high fall risk patients  - Consider moving patient to room near nurses station  Outcome: Progressing     Problem: Potential for Falls  Goal: Patient will remain free of falls  Description: INTERVENTIONS:  - Educate patient/family on patient safety including physical limitations  - Instruct patient to call for assistance with activity   - Consult OT/PT to assist with strengthening/mobility   - Keep Call bell within reach  - Keep bed low and locked with side rails adjusted as appropriate  - Keep care items and personal belongings within reach  - Initiate and maintain comfort rounds  - Make Fall Risk Sign visible to staff  - Offer Toileting every  Hours, in advance of need  - Initiate/Maintain alarm  - Obtain necessary fall risk management equipment:   - Apply yellow socks and bracelet for high fall risk patients  - Consider moving patient to room near nurses station  Outcome: Progressing     Problem: Prexisting or High Potential for Compromised Skin Integrity  Goal: Skin integrity is maintained or improved  Description: INTERVENTIONS:  - Identify patients at risk for skin breakdown  - Assess and monitor skin integrity  - Assess and monitor nutrition and hydration status  - Monitor labs   - Assess for incontinence   - Turn and reposition patient  - Assist with mobility/ambulation  - Relieve pressure over bony prominences  - Avoid friction and shearing  - Provide appropriate hygiene as needed including keeping skin clean and dry  - Evaluate need for skin moisturizer/barrier cream  - Collaborate with interdisciplinary team   - Patient/family teaching  - Consider wound care consult   Outcome: Progressing

## 2022-08-14 NOTE — ASSESSMENT & PLAN NOTE
· Monitor blood pressure  Continue with outpatient medication  · Continue with Coreg  · Auto substituted to Procardia  · Continue with torsemide  · BP is elevated  One time reading of low BP, do not believe this was accurate    Pt was asymptomatic

## 2022-08-14 NOTE — PROGRESS NOTES
NEPHROLOGY PROGRESS NOTE   Castro Oquendo 56 y o  female MRN: [id number]  Unit/Bed#: -01 Encounter: [id number]  Reason for Consult:   End-stage renal disease      Assessment/Plan:  1  End-stage renal disease on maintenance hemodialysis currently Tuesday Thursday Saturday, outpatient clinic [address], estimated dry weight 123 kg  2  CKD associated mineral bone disorder, calcium low at 6 8, hold further Sensipar, continue with Renagel  3  Anemia of chronic kidney disease currently not on MITCHELL therapy given history of pulmonary embolism  4  Status post fall with right-sided hip pain, imaging shows no acute osseous abnormality  5  Abdominal pain/ nausea, suspecting possible component gastroparesis  6  Encephalopathy status appears to wax and wane  Suspecting possible component due to polypharmacy  7  Recent diagnosis cellulitis - to complete outpatient doxycycline  8  Chronic lower back pain recent MRI showing L4/L5 osteomyelitis  Blood cultures at that time negative  CT imaging showed amyloid spondyloarthropathy  9  Uncontrolled hypertension, continue with outpatient antihypertensive regimen, improving  10  History pulmonary embolus, currently on Coumadin  11  Diabetes with recent hemoglobin A1c at 9 4    PLAN:  · Would continue with maintenance hemodialysis currently Tuesday Thursday Saturday   · Consider hemodialysis treatment tomorrow given shortened treatment yesterday   · Volume status appears stable   · Would consider stopping Lyrica given patient's mental status   · Plus further management as per primary service   · Given patient's end-stage renal disease, chronic pain, anxiety may benefit from palliative care consultation    SUBJECTIVE:   seen examined  Patient appears very restless at times  Does not appear short of breath  No active chest pain  Complains of abdominal fullness and nausea  Persistent right hip discomfort      Review of Systems    OBJECTIVE:  Current Weight: Weight - Scale: 127 kg (280 lb)  Vitals:    08/13/22 1108 08/13/22 1514 08/13/22 2307 08/14/22 0749   BP: (!) 144/109 (!) 89/62 157/51 145/71   BP Location: Right arm      Pulse: 80      Resp: 20   19   Temp:  98 3 °F (36 8 °C) 98 8 °F (37 1 °C) 98 5 °F (36 9 °C)   TempSrc:       SpO2:       Weight:           Intake/Output Summary (Last 24 hours) at 8/14/2022 0959  Last data filed at 8/13/2022 1108  Gross per 24 hour   Intake 300 ml   Output --   Net 300 ml       Physical Exam  Constitutional:       Appearance: She is obese  She is not ill-appearing  HENT:      Head: Normocephalic and atraumatic  Eyes:      General: No scleral icterus  Cardiovascular:      Rate and Rhythm: Normal rate and regular rhythm  Pulmonary:      Effort: Pulmonary effort is normal       Breath sounds: Normal breath sounds  Abdominal:      General: There is no distension  Palpations: Abdomen is soft  Musculoskeletal:      Right lower leg: No edema  Left lower leg: No edema  Comments: Access with positive bruit and thrill   Skin:     General: Skin is warm and dry  Neurological:      Mental Status: She is alert  Comments: Appears confused at times  Fidgety           Medications:    Current Facility-Administered Medications:     acetaminophen (TYLENOL) tablet 650 mg, 650 mg, Oral, Q6H PRN, Kayleigh Winslow MD    albuterol (PROVENTIL HFA,VENTOLIN HFA) inhaler 2 puff, 2 puff, Inhalation, Q6H PRN, Kayleigh Winslow MD    ALPRAZolam Dalbert Carton) tablet 0 25 mg, 0 25 mg, Oral, BID PRN, Kayleigh Winslow MD    atorvastatin (LIPITOR) tablet 20 mg, 20 mg, Oral, Daily With Fermin Dumont MD, 20 mg at 08/13/22 1736    b complex-vitamin C-folic acid (NEPHROCAPS) capsule 1 capsule, 1 capsule, Oral, Daily With Fermin Dumont MD, 1 capsule at 08/13/22 1736    carvedilol (COREG) tablet 25 mg, 25 mg, Oral, BID, Kayleigh Winslow MD, 25 mg at 08/14/22 0844    dicyclomine (BENTYL) capsule 10 mg, 10 mg, Oral, TID PRN, MD Galen Au doxycycline hyclate (VIBRAMYCIN) capsule 100 mg, 100 mg, Oral, Q12H [address], Jade Russell MD, 100 mg at 08/14/22 0844    HYDROmorphone (DILAUDID) injection 0 5 mg, 0 5 mg, Intravenous, Q3H PRN, Perla Tidwell PA-C, 0 5 mg at 08/13/22 2156    insulin glargine (LANTUS) subcutaneous injection 8 Units 0 08 mL, 8 Units, Subcutaneous, HS, Jade Russell MD, 8 Units at 08/13/22 2151    insulin lispro (HumaLOG) 100 units/mL subcutaneous injection 1-5 Units, 1-5 Units, Subcutaneous, TID AC **AND** Fingerstick Glucose (POCT), , , TID AC, Jade Russell MD    insulin lispro (HumaLOG) 100 units/mL subcutaneous injection 1-5 Units, 1-5 Units, Subcutaneous, HS, Jade Russell MD    insulin lispro (HumaLOG) 100 units/mL subcutaneous injection 3 Units, 3 Units, Subcutaneous, TID With Meals, Jade Russell MD    levothyroxine tablet 50 mcg, 50 mcg, Oral, Early Morning, Jade Russell MD, 50 mcg at 08/14/22 0530    lidocaine (LIDODERM) 5 % patch 1 patch, 1 patch, Topical, Daily, Jade Russell MD, 1 patch at 08/14/22 0845    loratadine (CLARITIN) tablet 10 mg, 10 mg, Oral, Daily, Jade Russell MD, 10 mg at 08/14/22 0845    melatonin tablet 3 mg, 3 mg, Oral, HS, Jade Russell MD, 3 mg at 08/13/22 2151    menthol-methyl salicylate (BENGAY) 02-86 % cream, , Apply externally, 4x Daily PRN, Hunter Rosales PA-C, Given at 08/13/22 0601    metoclopramide (REGLAN) injection 5 mg, 5 mg, Intravenous, Q6H PRN, Perla Tidwell PA-C    NIFEdipine (PROCARDIA XL) 24 hr tablet 30 mg, 30 mg, Oral, Daily, Jade Russell MD, 30 mg at 08/14/22 0845    nystatin (MYCOSTATIN) powder, , Topical, BID, Jade Russell MD, Given at 08/14/22 0845    oxyCODONE-acetaminophen (PERCOCET) 5-325 mg per tablet 1 tablet, 1 tablet, Oral, Q6H PRN, Jade Russell MD, 1 tablet at 08/14/22 0851    oxyCODONE-acetaminophen (PERCOCET) 5-325 mg per tablet 2 tablet, 2 tablet, Oral, Q6H PRN, Perla Tidwell PA-C, 2 tablet at 08/14/22 0530    pantoprazole (PROTONIX) EC tablet 40 mg, 40 mg, Oral, Early Morning, Melvin Kim MD, 40 mg at 08/14/22 0530    polyethylene glycol (MIRALAX) packet 17 g, 17 g, Oral, Daily, Melvin Kim MD, 17 g at 08/14/22 0845    pregabalin (LYRICA) capsule 50 mg, 50 mg, Oral, Daily, Melvin Kim MD, 50 mg at 08/14/22 0844    senna (SENOKOT) tablet 17 2 mg, 2 tablet, Oral, HS PRN, Melvin Kim MD    senna [facility]) tablet 8 6 mg, 1 tablet, Oral, Daily, Melvin Kim MD, 8 6 mg at 08/14/22 0844    sertraline (ZOLOFT) tablet 75 mg, 75 mg, Oral, Daily, Melvin Kim MD, 75 mg at 08/14/22 0844    sevelamer (RENAGEL) tablet 1,600 mg, 1,600 mg, Oral, TID With Meals, Melvin Kim MD, 1,600 mg at 08/14/22 0844    warfarin (COUMADIN) tablet 10 mg, 10 mg, Oral, Daily (warfarin), Perla Tidwell PA-C, 10 mg at 08/13/22 1736    Laboratory Results:  Results from last 7 days   Lab Units 08/13/22  0959 08/12/22  1515   WBC Thousand/uL 7 28 8 21   HEMOGLOBIN g/dL 8 6* 8 1*   HEMATOCRIT % 27 6* 25 6*   PLATELETS Thousands/uL 208 206   POTASSIUM mmol/L 4 4 5 3   CHLORIDE mmol/L 97 99   CO2 mmol/L 30 29   BUN mg/dL 40* 33*   CREATININE mg/dL 8 49* 7 06*   CALCIUM mg/dL 9 1 6 8*

## 2022-08-14 NOTE — ASSESSMENT & PLAN NOTE
· Patient came to the hospital after 2 falls at home  She had a fall on 08/04 and had another fall day of admission    Both the falls when she was trying to sit down and slid down to the floor  · PT OT evaluation recommend rehab

## 2022-08-14 NOTE — ASSESSMENT & PLAN NOTE
Lab Results   Component Value Date    EGFR 4 08/13/2022    EGFR 5 08/12/2022    EGFR 6 08/04/2022    CREATININE 8 49 (H) 08/13/2022    CREATININE 7 06 (H) 08/12/2022    CREATININE 6 88 (H) 08/04/2022     · Patient gets dialysis on Tuesday Thursday Saturday      · Continue with Nephrocaps and sensipar and Renagel  · Nephrology consulted

## 2022-08-14 NOTE — CASE MANAGEMENT
Case Management Assessment & Discharge Planning Note    Patient name Haleigh Cummins  Location /-71 MRN [id number]  : 1965 Date 2022       Current Admission Date: 2022  Current Admission Diagnosis:Fall   Patient Active Problem List    Diagnosis Date Noted    Cellulitis 2022    Intractable back pain 2022    Problem with vascular access 2022    Concern for Osteomyelitis/Discitis of lumbar region 2022    Fall 2022    Hypoxia 2022    COVID-19 2022    Right knee pain 2022    Constipation 2022    Pruritus 2021    Postop check 2021    Sigmoid diverticulitis 2021    Pneumonia 2021    Chronic anticoagulation 2021    Essential hypertension 2021    Arteriovenous fistula for hemodialysis in place, primary ([facility] ) 2021    Healthcare-associated pneumonia 2021    Right foot pain 2021    A-V fistula ([facility] ) 2021    Chronic pain syndrome 2021    Bilateral primary osteoarthritis of knee 2021    Bilateral patellofemoral syndrome 2021    Tinea unguium 2020    S/P foot surgery, right 2020    History of claustrophobia 2020    Morbid obesity 2020    Hyperlipidemia 2020    Diabetic polyneuropathy associated with type 2 diabetes mellitus ([facility] ) 2020    Encounter for diabetic foot exam ([facility] ) 2020    Corns and callosities 2020    History of transmetatarsal amputation of right foot ([facility] ) 2020    Flu-like symptoms 2020    Lumbar radiculopathy 2020    Low back pain with sciatica 2020    Hemodialysis-associated hypotension 2019    Hyponatremia 2019    Parenchymal renal hypertension 2019    Candidiasis of breast 2019    Hyperkalemia 2019    Anemia of chronic disease 2019    Disruption of internal surgical wound 2019    Dyspnea 2019  Secondary hyperparathyroidism of renal origin ([facility] ) 04/27/2019    Nausea and vomiting 04/26/2019    Meningioma ([place] [address] ) 04/18/2019    Concussion without loss of consciousness 03/15/2019    Colon cancer screening 03/05/2019    Gastroesophageal reflux disease 03/05/2019    Primary osteoarthritis of right knee 10/25/2018    Hemodialysis status ([place] [address] ) 10/23/2018    Knee pain 10/22/2018    Ambulatory dysfunction 10/22/2018    Anxiety disorder 04/06/2017    Acquired hypothyroidism 07/22/2016    Glaucoma 07/01/2016    ESRD on hemodialysis 07/01/2016    Abnormal uterine bleeding 02/15/2016    History of venous thromboembolism 02/14/2016    Pulmonary embolus ([place] [address] ) 10/30/2015    Cervical dysplasia 05/03/2015    Chronic endometritis 10/17/2014    Tinea pedis 10/02/2014    Benign neoplasm of skin 09/25/2014    Type 2 diabetes mellitus ([place] [address] ) 09/04/2014    Phlebitis and thrombophlebitis of superficial vessels of lower extremities 04/10/2014    Limb pain 11/25/2013    Mononeuritis of upper limb, unspecified laterality 11/25/2013    Legal blindness, as defined in [place] 41/09/4741    Complication from renal dialysis device 04/22/2013    Essential hypertension 10/15/2012    Cardiomyopathy ([place] [address] ) 09/26/2012    Esophageal reflux 08/20/2012    Allergic rhinitis 08/20/2012      LOS (days): 1  Geometric Mean LOS (GMLOS) (days):   Days to GMLOS:     OBJECTIVE:  PATIENT READMITTED TO HOSPITAL  Risk of Unplanned Readmission Score: 53 87         Current admission status: Inpatient       Preferred Pharmacy:   [facility]/pharmacy #[id number] RALPH Sandhu - [address] [facility]  [address] [place] [address]  Phone: [phone] Fax: [address] [facility] [place], [place], [address] [place] [address]  Phone: [phone] Fax: [phone]    Primary Care Provider: Mickey Marie MD    Primary Insurance: MEDICARE  Secondary Insurance: Santosh Zuluaga [address]    ASSESSMENT:  [address] Representative - Significant Other   Primary Phone: [phone] (Mobile)  Home Phone: [phone]                    Patient Information  Admitted from[de-identified] Home  Mental Status: Alert  During Assessment patient was accompanied by: Not accompanied during assessment  Assessment information provided by[de-identified] Patient  Primary Caregiver: Self  Support Systems: Spouse/significant other  What city do you live in?: [place]  Type of Current Residence: 2 story home  Upon entering residence, is there a bedroom on the main floor (no further steps)?: No  Upon entering residence, is there a bathroom on the main floor (no further steps)?: No  In the last 12 months, was there a time when you were not able to pay the mortgage or rent on time?: No  In the last 12 months, how many places have you lived?: 1  In the last 12 months, was there a time when you did not have a steady place to sleep or slept in a shelter (including now)?: No  Homeless/housing insecurity resource given?: N/A  Living Arrangements: Lives w/ Spouse/significant other    Activities of Daily Living Prior to Admission  Functional Status: Independent  Completes ADLs independently?: Yes  Ambulates independently?: Yes  Does patient use assisted devices?: Yes  Assisted Devices (DME) used: [employer]  Does patient currently own DME?: Yes  What DME does the patient currently own?: [employer]  Does patient have a history of Outpatient Therapy (PT/OT)?: No  Does the patient have a history of Short-Term Rehab?: No  Does patient have a history of HHC?: Yes  Does patient currently have [place] [address]?: No         Patient Information Continued  Income Source: SSI/SSD  Within the past 12 months, you worried that your food would run out before you got the money to buy more : Never true  Within the past 12 months, the food you bought just didn't last and you didn't have money to get more : Never true  Food insecurity resource given?: N/A  Does patient receive dialysis treatments?: Yes (Tuesday Thursday Saturday, outpatient clinic [address])         Discharge planning discussed with[de-identified] patient  Freedom of Choice: Yes  Comments - Freedom of Choice: Choice offered in the interest of dc planning - STR, pt not agreeable to skilled rehab would like to go home with [facility] PT/OT, no preference on agency  CM contacted family/caregiver?: No- see comments (pt makes own decisions)  Were Treatment Team discharge recommendations reviewed with patient/caregiver?: Yes  Did patient/caregiver verbalize understanding of patient care needs?: Yes  Were patient/caregiver advised of the risks associated with not following Treatment Team discharge recommendations?: Yes    Contacts  Patient Contacts: Surjit Youngblood  Relationship to Patient[de-identified] Family  Contact Method: Phone  Phone Number: [phone]  Reason/Outcome: Continuity of Care, Emergency Contact, Discharge [address]         Is the patient interested in [address] at discharge?: Yes  Via Meryl Rico requested[de-identified] Physical Therapy, Occupational [address] Name[de-identified] Other ( )  [address] [place] [address] Provider[de-identified] PCP  Home Health Services Needed[de-identified] Evaluate Functional Status and Safety, Strengthening/Theraputic Exercises to Improve Function  Homebound Criteria Met[de-identified] Requires the Assistance of Another Person for Safe Ambulation or to Leave the Home  Supporting Clincal Findings[de-identified] Fatigues Easliy in [employer], Limited Endurance         Other Referral/Resources/Interventions Provided:  Referral Comments: CM and pt reviewed PT recommendation of STR, pt would like to progress with PT while IP in hopes of being dc home with Home care PT/OT  Pt agreeable to home health care no agency preference           Treatment Team Recommendation: Short Term Rehab              Additional Comments: CM will continue to follow for safe d/c plan coordination; continue to recommend pt consider STR to enhance strength, mobility and edurance; continue to educate pt on difference between home care with PT and skilled STR where pt would receive daily PT in a gym, providing more care 24/7 vs going home with home care, pt would not be seen daily by a PT or have the benefit of skilled nursing care

## 2022-08-15 LAB
GLUCOSE SERPL-MCNC: 105 MG/DL (ref 65–140)
GLUCOSE SERPL-MCNC: 125 MG/DL (ref 65–140)
GLUCOSE SERPL-MCNC: 158 MG/DL (ref 65–140)
GLUCOSE SERPL-MCNC: 67 MG/DL (ref 65–140)
GLUCOSE SERPL-MCNC: 89 MG/DL (ref 65–140)

## 2022-08-15 PROCEDURE — 97167 OT EVAL HIGH COMPLEX 60 MIN: CPT

## 2022-08-15 PROCEDURE — 99232 SBSQ HOSP IP/OBS MODERATE 35: CPT | Performed by: PHYSICIAN ASSISTANT

## 2022-08-15 PROCEDURE — 82948 REAGENT STRIP/BLOOD GLUCOSE: CPT

## 2022-08-15 PROCEDURE — 99232 SBSQ HOSP IP/OBS MODERATE 35: CPT | Performed by: INTERNAL MEDICINE

## 2022-08-15 RX ORDER — CARVEDILOL 25 MG/1
25 TABLET ORAL 2 TIMES DAILY
Status: DISCONTINUED | OUTPATIENT
Start: 2022-08-15 | End: 2022-08-21 | Stop reason: HOSPADM

## 2022-08-15 RX ORDER — SIMETHICONE 80 MG
80 TABLET,CHEWABLE ORAL EVERY 6 HOURS PRN
Status: DISCONTINUED | OUTPATIENT
Start: 2022-08-15 | End: 2022-08-21 | Stop reason: HOSPADM

## 2022-08-15 RX ADMIN — LEVOTHYROXINE SODIUM 50 MCG: 75 TABLET ORAL at 05:24

## 2022-08-15 RX ADMIN — PREGABALIN 50 MG: 50 CAPSULE ORAL at 08:14

## 2022-08-15 RX ADMIN — OXYCODONE HYDROCHLORIDE AND ACETAMINOPHEN 2 TABLET: 5; 325 TABLET ORAL at 05:38

## 2022-08-15 RX ADMIN — PANTOPRAZOLE SODIUM 40 MG: 40 TABLET, DELAYED RELEASE ORAL at 05:24

## 2022-08-15 RX ADMIN — WARFARIN SODIUM 10 MG: 5 TABLET ORAL at 17:35

## 2022-08-15 RX ADMIN — OXYCODONE HYDROCHLORIDE AND ACETAMINOPHEN 2 TABLET: 5; 325 TABLET ORAL at 10:09

## 2022-08-15 RX ADMIN — INSULIN GLARGINE 8 UNITS: 100 INJECTION, SOLUTION SUBCUTANEOUS at 21:27

## 2022-08-15 RX ADMIN — SERTRALINE HYDROCHLORIDE 75 MG: 50 TABLET ORAL at 08:14

## 2022-08-15 RX ADMIN — LORATADINE 10 MG: 10 TABLET ORAL at 08:14

## 2022-08-15 RX ADMIN — OXYCODONE HYDROCHLORIDE AND ACETAMINOPHEN 2 TABLET: 5; 325 TABLET ORAL at 21:37

## 2022-08-15 RX ADMIN — SEVELAMER HYDROCHLORIDE 1600 MG: 800 TABLET ORAL at 11:47

## 2022-08-15 RX ADMIN — NYSTATIN: 100000 POWDER TOPICAL at 17:37

## 2022-08-15 RX ADMIN — LIDOCAINE 5% 1 PATCH: 700 PATCH TOPICAL at 08:15

## 2022-08-15 RX ADMIN — SEVELAMER HYDROCHLORIDE 1600 MG: 800 TABLET ORAL at 08:14

## 2022-08-15 RX ADMIN — NEPHROCAP 1 CAPSULE: 1 CAP ORAL at 17:34

## 2022-08-15 RX ADMIN — NYSTATIN: 100000 POWDER TOPICAL at 08:14

## 2022-08-15 RX ADMIN — MENTHOL, METHYL SALICYLATE: 10; 15 CREAM TOPICAL at 21:34

## 2022-08-15 RX ADMIN — SENNOSIDES 8.6 MG: 8.6 TABLET, FILM COATED ORAL at 08:14

## 2022-08-15 RX ADMIN — SEVELAMER HYDROCHLORIDE 1600 MG: 800 TABLET ORAL at 17:34

## 2022-08-15 RX ADMIN — MELATONIN 3 MG: at 21:28

## 2022-08-15 RX ADMIN — ATORVASTATIN CALCIUM 20 MG: 20 TABLET, FILM COATED ORAL at 17:35

## 2022-08-15 RX ADMIN — POLYETHYLENE GLYCOL 3350 17 G: 17 POWDER, FOR SOLUTION ORAL at 08:14

## 2022-08-15 RX ADMIN — INSULIN LISPRO 1 UNITS: 100 INJECTION, SOLUTION INTRAVENOUS; SUBCUTANEOUS at 21:29

## 2022-08-15 NOTE — ASSESSMENT & PLAN NOTE
· Pt and SO report persistent nausea and vomiting for "a while "  Pt was recommended to have gastric emptying study after EGD in November of 2021, this was not done   · Recommend she be evaluated for suspected gastroparesis  · PRN antiemetic  · KUB negative    Pt had BM 8/14 with improvement of abdominal pain and nausea

## 2022-08-15 NOTE — ASSESSMENT & PLAN NOTE
· Patient is on Coumadin as outpatient  · Patient reported that she did not take Coumadin previous day of admission   · On 9 mg of Coumadin  INR is subtherapeutic -- repeat pending    Continue 10 mg daily with daily INR

## 2022-08-15 NOTE — OCCUPATIONAL THERAPY NOTE
Occupational Therapy Evaluation     Patient Name: Cam Meeks  IOFAJ'V Date: 8/15/2022  Problem List  Principal Problem:    Fall  Active Problems:    Essential hypertension    ESRD on hemodialysis    Anxiety disorder    Ambulatory dysfunction    Nausea and vomiting    Secondary hyperparathyroidism of renal origin ([facility] [address] )    Anemia of chronic disease    Morbid obesity    Hyperlipidemia    Type 2 diabetes mellitus ([facility] [address] )    Acquired hypothyroidism    Pulmonary embolus (HCC)    Chronic pain syndrome    Chronic anticoagulation    Essential hypertension    Concern for Osteomyelitis/Discitis of lumbar region    Cellulitis    Past Medical History  Past Medical History:   Diagnosis Date    Abnormal uterine bleeding (AUB)     Anemia     Anxiety     Arthritis     Chronic kidney disease     Claustrophobia     Diabetes mellitus ([facility] [address] )     Disease of thyroid gland     DVT (deep venous thrombosis) (HCC)     End stage kidney disease ([facility] )     Foot ulcer due to secondary DM ([facility] )     Hemodialysis patient ([facility] )     Tues, Thurs, Sat    Hypertension     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[facility] )     right eye    Morbid obesity ([facility] [address] [address] )     Osteomyelitis of fifth toe of right foot ([facility] [address] )     Panic attacks     Pulmonary embolism (HCC)     Reflux esophagitis     Thrombophlebitis of saphenous vein     Warfarin anticoagulation      Past Surgical History  Past Surgical History:   Procedure Laterality Date    AMPUTATION      r 4th toe    ARTERIOVENOUS GRAFT PLACEMENT      CATARACT EXTRACTION Bilateral     CERVICAL BIOPSY  W/ LOOP ELECTRODE EXCISION       SECTION      DIALYSIS FISTULA CREATION      DILATION AND CURETTAGE OF UTERUS      ENDOMETRIAL ABLATION W/ NOVASURE      EYE SURGERY      HYSTERECTOMY      @ age 57 (complete)    IR AV FISTULAGRAM/GRAFTOGRAM  10/11/2019    IR AV FISTULAGRAM/GRAFTOGRAM  2020    IR AV FISTULAGRAM/GRAFTOGRAM  2020    IR NON-TUNNELED CENTRAL LINE PLACEMENT 6/29/2021    IR NON-TUNNELED CENTRAL LINE PLACEMENT  10/4/2021    OOPHORECTOMY Bilateral     @ age 57    [address] Right 12/26/2021    Procedure: AMPUTATION TRANSMETATARSAL (TMA);  Surgeon: Bella Zazueta DPM;  [facility];  Service: Podiatry    NV AMPUTATION TOE,MT-P JT Right 5/28/2020    Procedure: AMPUTATION TOE- 5th;  Surgeon: Bella Zazueta DPM;  [facility];  Service: Podiatry    NV COLONOSCOPY FLX DX W/COLLJ [address] PFRMD N/A 3/13/2019    Procedure: COLONOSCOPY;  Surgeon: Meeta Baumann MD;  Location: [facility]; Service: Gastroenterology    NV ESOPHAGOGASTRODUODENOSCOPY TRANSORAL DIAGNOSTIC N/A 3/13/2019    Procedure: ESOPHAGOGASTRODUODENOSCOPY (EGD); Surgeon: Meeta Baumann MD;  [facility]: [facility]; Service: Gastroenterology    NV LAPAROSCOPY W TOT HYSTERECT UTERUS 250 GRAM OR LESS N/A 2/16/2016    Procedure: HYSTERECTOMY LAPAROSCOPIC TOTAL [facility]), with bilateral salpingectomy;  Surgeon: Brendan Drew DO;  [facility];  Service: Gynecology    THROMBECTOMY / ARTERIOVENOUS GRAFT REVISION      TOE SURGERY Right     removal of the 4th toe    WOUND DEBRIDEMENT Right 10/8/2021    Procedure: R 1st MTPJ washout ;  Surgeon: Renato Zacarias DPM;  [facility];  Service: Podiatry         08/15/22 1000   OT Last Visit   OT Visit Date 08/15/22   Note Type   Note type Evaluation   Restrictions/Precautions   Weight Bearing Precautions Per Order No   Other Precautions Telemetry; Fall Risk;Pain;Visual impairment;Cognitive; Chair Alarm; Bed Alarm   Pain Assessment   Pain Assessment Tool 0-10   Pain Score 8   Pain Location/Orientation Orientation: Right;Location: Hip   Home Living   Type of [address] [place] [address] Two level;1/2 bath on main level;Bed/bath upstairs;Stairs to enter with rails   Bathroom Shower/Tub (pt sponge bathes only)   Bathroom Toilet Standard   Bathroom Equipment Commode   Bathroom Accessibility Accessible   Home Equipment Walker;Cane;Other (Comment)  (transport chair)   Prior Function   Level of [place] Independent with ADLs and functional mobility; Needs assistance with IADLs   Lives With Significant other   Receives Help From Family   ADL Assistance Independent   IADLs Needs assistance   Falls in the last 6 months 5 to 10   Vocational On disability   Lifestyle   Autonomy I with ADL's, assistance from SO with IADL's, +RW household distance mobility, +SPC in community, (-) drives -pt uses [employer] or boyfriend will provide transportation     Reciprocal Relationships SO   Service to Others on disability   Intrinsic Gratification playing games on phone   Psychosocial   Psychosocial (WDL) WDL   ADL   Eating Assistance 7  Independent   Grooming Assistance 7  Independent   UB Bathing Assistance 5  Supervision/Setup   LB Bathing Assistance 3  Moderate Assistance   UB Dressing Assistance 4  Minimal [facility] 3  Moderate [address]  3  Moderate Assistance   Bed Mobility   Supine to Sit 4  Minimal assistance   Additional items Assist x 1   Sit to Supine Unable to assess   Additional Comments pt remained in chair at end of session with all needs met -RN aware   Transfers   Sit to Stand 3  Moderate assistance   Additional items Assist x 1   Stand to Sit 3  Moderate assistance   Additional items Assist x 1   Additional Comments +RW and cues for safe hand placement-continue to reinforce   Functional Mobility   Functional Mobility 4  Minimal assistance   Additional Comments min a x 1 with RW short distance functional mobility with steps to chair   Additional items Rolling walker   Balance   Static Sitting Fair   Dynamic Sitting Fair -   Static Standing Poor +   Dynamic Standing Poor   Ambulatory Poor   Activity Tolerance   Activity Tolerance Patient limited by fatigue;Patient limited by pain;Treatment limited secondary to medical complications (Comment)   Nurse Made Aware RN cleared pt for therapy   RUE Assessment   RUE Assessment WFL   LUE Assessment   LUE Assessment WFL   Hand Function   Gross Motor Coordination Functional   Fine Motor Coordination Functional   Vision-Basic Assessment   Patient Visual Report (legally blind in right eye, blurriness in left able to read large print text on phone at baseline)   Vision - Complex Assessment   Acuity (large print)   Additional Comments dyscongugae gaze with right eye   Perception   Inattention/Neglect Appears intact   Cognition   Arousal/Participation Alert; Responsive;Arousable   Attention Attends with cues to redirect   Orientation Level Oriented X4   Memory (good historian)   Following Commands Follows one step commands inconsistently   Comments pt slow processing, anxious (fear of falling/pain to right hip) supportive listening encouragement provided, decreased attention/focus with session and decreased initiation with functional tasks  Assessment   Limitation Decreased ADL status; Decreased Safe judgement during ADL;Decreased cognition;Decreased endurance;Decreased self-care trans;Decreased high-level ADLs   Prognosis Fair   Goals   Patient Goals less pain   LTG Time Frame 10-14   Long Term Goal #1 see goals below   Plan   Treatment Interventions ADL retraining;Functional transfer training;UE strengthening/ROM; Endurance training;Patient/family training;Equipment evaluation/education; Compensatory technique education; Energy conservation; Activityengagement   Goal Expiration Date 08/29/22   OT Treatment Day 1   OT Frequency 3-5x/wk   Recommendation   OT Discharge Recommendation Post acute rehabilitation services   AM-PAC Daily Activity Inpatient   Lower Body Dressing 2   Bathing 2   Toileting 2   Upper Body Dressing 3   Grooming 4   Eating 4   Daily Activity Raw Score 17   Daily Activity Standardized Score (Calc for Raw Score >=11) 37 26   AM-PAC Applied Cognition Inpatient   Following a Speech/Presentation 3   Understanding Ordinary Conversation 4 Taking Medications 2   Remembering Where Things Are Placed or Put Away 3   Remembering List of 4-5 Errands 3   Taking Care of Complicated Tasks 2   Applied Cognition Raw Score 17   Applied Cognition Standardized Score 36 52     OT assessment:  Pt is a 56 y o  female who was [facility] 8/12[facility]2022 with Fall (2 falls) with right sided pain (no acute changes on imaging)   Pt's problem list also includes PMH of  has a past medical history of Abnormal uterine bleeding (AUB), Anemia, Anxiety, Arthritis, Chronic kidney disease, Claustrophobia, Diabetes mellitus ([facility] [address] ), Disease of thyroid gland, DVT (deep venous thrombosis) ([facility] [address] ), End stage kidney disease ([facility] [address] ), Foot ulcer due to secondary DM ([facility] [address] ), Hemodialysis patient ([facility] [address] ), Hypertension, 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[facility] ), Morbid obesity ([facility] ), Osteomyelitis of fifth toe of right foot ([facility] [address] ), Panic attacks, Pulmonary embolism ([facility] [address] ), Reflux esophagitis, Thrombophlebitis of saphenous vein, and Warfarin anticoagulation    At baseline pt was completing   Pt lives with SO in a [address] with BARBARA  Currently pt requires min/mod a  for overall ADLS and min a with Rw for functional mobility/transfers  Pt currently presents with impairments in the following categories -steps to enter environment, difficulty performing ADLS and difficulty performing IADLS  activity tolerance and endurance  These impairments, as well as pt's fatigue and pain  limit pt's ability to safely engage in all baseline areas of occupation, includingbathing, dressing, toileting, functional mobility/transfers, community mobility, social participation  and leisure activities  From OT standpoint, recommend STR upon D/C  OT will continue to follow to address the below stated goals  Occupational Therapy Goals:    *Mod I with bed mobility to engage in functional tasks    *Mod I Adl's after setup with use of AE PRN  *Mod I toileting and clothing management   *Mod I functional mobility and transfers to/from all surfaces with Fair + dynamic balance and safety for participation in dynamic adls and iadl tasks   *Demonstrate good carryover with safe use of RW during functional tasks   *Assess DME needs   *Increase activity tolerance to 25-30 minutes for participation in adls and enjoyable activities  *Pt will increase attention and task initiation to good with all functional tasks  *Demonstrate good carryover of pt/family education and training with good tolerance for increased safety and independence with ADL's/ADl's  *Pt will improve standing balance to 2-3 minutes with functional tasks to increase I with toileting/transfers    *Patient will demonstrate 100% carryover of energy conservation techniques t/o functional I/ADL/leisure tasks w/o cues s/p skilled education to increase endurance during functional tasks    Sarah Herron MOT, OTR/L

## 2022-08-15 NOTE — PROGRESS NOTES
[address] [facility]  Progress Note Richard Conteh 1965, 56 y o  female MRN: [id number]  Unit/Bed#: -01 Encounter: [id number]  Primary Care Provider: Anamaria Castillo MD   Date and time admitted to hospital: 8/12/2022 11:43 AM    * [address]  · Patient came to the hospital after had are being 2 falls at home with right-sided hip/back pain that radiates down her R leg  She was recently in hospital with complaint of similar pain   · No acute osseous abnormality on CT scan  · Pain control -- pt reports her pain feels better today   · PT OT evaluation -- recommending rehab at this time  Pt is unsure if she would want to go to rehab  Recommend repeat PT eval now that pain is better controlled     Cellulitis  Assessment & Plan  · Patient was started on antibiotics during the recent hospital stay due to concern for cellulitis of the right wrist region from previous IV site  · Completed PO Doxycycline course 8/14    Concern for Osteomyelitis/Discitis of lumbar region  Assessment & Plan  · Patient came to the hospital recently with back pain and was evaluated by Neurosurgery and Infectious Disease  · No plan for any biopsy or surgical intervention    Essential hypertension  Assessment & Plan  · Monitor blood pressure  Continue with outpatient medication  · Continue with Coreg  · D/c nifedipine as BP soft today   · Continue with torsemide  · Nephrology following     Chronic anticoagulation  Assessment & Plan  · Patient is on Coumadin as outpatient  · Patient reported that she did not take Coumadin previous day of admission   · On 9 mg of Coumadin  INR is subtherapeutic -- repeat pending  Continue 10 mg daily with daily INR     Chronic pain syndrome  Assessment & Plan  · Followed by pain management as outpatient    Pulmonary embolus [facility])  Assessment & Plan  · Patient with previous history of PE  Subtherapeutic INR on last check    Repeat is pending   · Questionable compliance  · Continue coumadin     Acquired hypothyroidism  Assessment & Plan  · Continue Synthroid    Hyperlipidemia  Assessment & Plan  · Continue with statin    Morbid obesity  Assessment & Plan  · TLC as outpatient    Anemia of chronic disease  Assessment & Plan  · Hemoglobin at baseline  · Monitor    Nausea and vomiting  Assessment & Plan  · Pt and SO report persistent nausea and vomiting for "a while "  Pt was recommended to have gastric emptying study after EGD in November of 2021, this was not done   · Recommend she be evaluated for suspected gastroparesis  · PRN antiemetic  · KUB negative  Pt had BM 8/14 with improvement of abdominal pain and nausea     Ambulatory dysfunction  Assessment & Plan  · Patient came to the hospital after 2 falls at home  She had a fall on 08/04 and had another fall day of admission  Both the falls when she was trying to sit down and slid down to the floor  · PT OT evaluation recommend rehab -- pt is unsure if she would agree to rehab, she prefers to go home, will f/u with repeat PT evaluations     Anxiety disorder  Assessment & Plan  · Continue with p r n  Xanax    ESRD on hemodialysis  Assessment & Plan  Lab Results   Component Value Date    EGFR 4 08/13/2022    EGFR 5 08/12/2022    EGFR 6 08/04/2022    CREATININE 8 49 (H) 08/13/2022    CREATININE 7 06 (H) 08/12/2022    CREATININE 6 88 (H) 08/04/2022     · Patient gets dialysis on Tuesday Thursday Saturday  · Continue with Nephrocaps and sensipar and Renagel  · Nephrology consulted     Essential hypertension  Assessment & Plan  · Monitor blood pressure        VTE Pharmacologic Prophylaxis: VTE Score: 8 High Risk (Score >/= 5) - Pharmacological DVT Prophylaxis Ordered: warfarin (Coumadin)  Sequential Compression Devices Ordered  Patient Centered Rounds: I performed bedside rounds with nursing staff today    Discussions with Specialists or Other Care Team Provider: RN     Education and Discussions with Family / Patient: Updated  (significant other) at bedside  Time Spent for Care: 30 minutes  More than 50% of total time spent on counseling and coordination of care as described above  Current Length of Stay: 2 day(s)  Current Patient Status: Inpatient   Certification Statement: The patient will continue to require additional inpatient hospital stay due to safe discharge plan  Discharge Plan: Anticipate discharge in 24-48 hrs to rehab vs home    Code Status: Level 1 - Full Code    Subjective:   Patient has no acute complaints  She is feeling better today, RT hip/back pain is better  She reports that her nausea and abdominal pain is improved after having a BM yesterday  She is not sure if she would agree to rehab  Would like PT to re-evaluate as her pain improves  Objective:     Vitals:   Temp (24hrs), Av 9 °F (36 6 °C), Min:97 8 °F (36 6 °C), Max:98 °F (36 7 °C)    Temp:  [97 8 °F (36 6 °C)-98 °F (36 7 °C)] 98 °F (36 7 °C)  HR:  [65] 65  Resp:  [16-18] 18  BP: ()/(55-70) 96/70  SpO2:  [88 %-96 %] 96 %  Body mass index is 45 19 kg/m²  Input and Output Summary (last 24 hours):   No intake or output data in the 24 hours ending 08/15/22 1338    Physical Exam:   Physical Exam  Vitals reviewed  Constitutional:       General: She is not in acute distress  Appearance: She is not toxic-appearing  HENT:      Head: Normocephalic and atraumatic  Eyes:      Extraocular Movements: Extraocular movements intact  Pulmonary:      Effort: Pulmonary effort is normal  No respiratory distress  Musculoskeletal:         General: Normal range of motion  Neurological:      General: No focal deficit present  Mental Status: She is alert and oriented to person, place, and time  Cranial Nerves: No cranial nerve deficit  Psychiatric:         Mood and Affect: Mood normal          Behavior: Behavior normal          Thought Content:  Thought content normal          Judgment: Judgment normal  Additional Data:     Labs:  Results from last 7 days   Lab Units 08/13/22  0959 08/12/22  1515   WBC Thousand/uL 7 28 8 21   HEMOGLOBIN g/dL 8 6* 8 1*   HEMATOCRIT % 27 6* 25 6*   PLATELETS Thousands/uL 208 206   NEUTROS PCT %  --  74   LYMPHS PCT %  --  14   MONOS PCT %  --  9   EOS PCT %  --  1     Results from last 7 days   Lab Units 08/13/22  0959   SODIUM mmol/L 137   POTASSIUM mmol/L 4 4   CHLORIDE mmol/L 97   CO2 mmol/L 30   BUN mg/dL 40*   CREATININE mg/dL 8 49*   ANION GAP mmol/L 10   CALCIUM mg/dL 9 1   GLUCOSE RANDOM mg/dL 151*     Results from last 7 days   Lab Units 08/14/22  0536   INR  1 04     Results from last 7 days   Lab Units 08/15/22  1104 08/15/22  0738 08/15/22  0557 08/14/22  2040 08/14/22  1551 08/14/22  1101 08/14/22  0631 08/13/22  2135 08/13/22  1729 08/13/22  1235 08/13/22  0806 08/12/22 2005   POC GLUCOSE mg/dl 105 89 67 111 93 88 91 117 133 127 153* 94               Lines/Drains:  Invasive Devices  Report    Peripheral Intravenous Line  Duration           Peripheral IV 08/12/22 Right Forearm 2 days          Line  Duration           Hemodialysis AV Fistula 02/20/18 Left Forearm 1636 days                      Imaging: No pertinent imaging reviewed      Recent Cultures (last 7 days):         Last 24 Hours Medication List:   Current Facility-Administered Medications   Medication Dose Route Frequency Provider Last Rate    acetaminophen  650 mg Oral Q6H PRN Caitlin Novak MD      albuterol  2 puff Inhalation Q6H PRN Caitlin Novak MD      ALPRAZolam  0 25 mg Oral BID PRN Caitlin Novak MD      atorvastatin  20 mg Oral Daily With Janey Mondragon MD      b complex-vitamin C-folic acid  1 capsule Oral Daily With Janey Mondragon MD      carvedilol  25 mg Oral BID Cruz Mathis MD      dicyclomine  10 mg Oral TID PRN Caitlin Novak MD      HYDROmorphone  0 5 mg Intravenous Q3H PRN Justin Javier PA-C      insulin glargine  8 Units Subcutaneous HS MD Yokasta Espinoza insulin lispro  1-5 Units Subcutaneous TID [facility] Darryl Roberts MD      insulin lispro  1-5 Units Subcutaneous HS Darryl Roberts MD      insulin lispro  3 Units Subcutaneous TID With Meals Darryl Roberts MD      levothyroxine  50 mcg Oral Early Morning Darryl Roberts MD      lidocaine  1 patch Topical Daily Darryl Roberts MD      loratadine  10 mg Oral Daily Darryl Roberts MD      melatonin  3 mg Oral HS Darryl Roberts MD      menthol-methyl salicylate   Apply externally 4x Daily PRN Lisa Rosales PA-C      metoclopramide  5 mg Intravenous Q6H PRN Gearld Gilford, PA-C      nystatin   Topical BID Darryl Roberts MD      oxyCODONE-acetaminophen  2 tablet Oral Q6H PRN Gearld Gilford, PA-C      pantoprazole  40 mg Oral Early Morning Darryl Roberts MD      polyethylene glycol  17 g Oral Daily Darryl Roberts MD      pregabalin  50 mg Oral Daily Darryl Roberts MD      senna  2 tablet Oral HS PRN Darryl Roberts MD      senna  1 tablet Oral Daily Darryl Roberts MD      sertraline  75 mg Oral Daily Darryl Roberts MD      sevelamer  1,600 mg Oral TID With Meals Darryl Roberts MD     Tian Pert warfarin  10 mg Oral Daily (warfarin) Gearld Gilford, PA-C          Today, Patient Was Seen By: Gearld Gilford, PA-C    **Please Note: This note may have been constructed using a voice recognition system  **

## 2022-08-15 NOTE — ASSESSMENT & PLAN NOTE
· Patient with previous history of PE  Subtherapeutic INR on last check    Repeat is pending   · Questionable compliance  · Continue coumadin

## 2022-08-15 NOTE — CASE MANAGEMENT
Case Management Discharge Planning Note    Patient name Caitlin Dorman /-58 MRN [id number]  : 1965 Date 8/15/2022       Current Admission Date: 2022  Current Admission Diagnosis:Fall   Patient Active Problem List    Diagnosis Date Noted    Cellulitis 2022    Intractable back pain 2022    Problem with vascular access 2022    Concern for Osteomyelitis/Discitis of lumbar region 2022    Fall 2022    Hypoxia 2022    COVID-19 2022    Right knee pain 2022    Constipation 2022    Pruritus 2021    Postop check 2021    Sigmoid diverticulitis 2021    Pneumonia 2021    Chronic anticoagulation 2021    Essential hypertension 2021    Arteriovenous fistula for hemodialysis in place, primary ([facility] ) 2021    Healthcare-associated pneumonia 2021    Right foot pain 2021    A-V fistula ([facility] ) 2021    Chronic pain syndrome 2021    Bilateral primary osteoarthritis of knee 2021    Bilateral patellofemoral syndrome 2021    Tinea unguium 2020    S/P foot surgery, right 2020    History of claustrophobia 2020    Morbid obesity 2020    Hyperlipidemia 2020    Diabetic polyneuropathy associated with type 2 diabetes mellitus ([facility] ) 2020    Encounter for diabetic foot exam ([facility] ) 2020    Corns and callosities 2020    History of transmetatarsal amputation of right foot ([facility] ) 2020    Flu-like symptoms 2020    Lumbar radiculopathy 2020    Low back pain with sciatica 2020    Hemodialysis-associated hypotension 2019    Hyponatremia 2019    Parenchymal renal hypertension 2019    Candidiasis of breast 2019    Hyperkalemia 2019    Anemia of chronic disease 2019    Disruption of internal surgical wound 2019    Dyspnea 2019    Secondary hyperparathyroidism of renal origin ([facility] ) 04/27/2019    Nausea and vomiting 04/26/2019    Meningioma ([place] [address] ) 04/18/2019    Concussion without loss of consciousness 03/15/2019    Colon cancer screening 03/05/2019    Gastroesophageal reflux disease 03/05/2019    Primary osteoarthritis of right knee 10/25/2018    Hemodialysis status ([place] [address] ) 10/23/2018    Knee pain 10/22/2018    Ambulatory dysfunction 10/22/2018    Anxiety disorder 04/06/2017    Acquired hypothyroidism 07/22/2016    Glaucoma 07/01/2016    ESRD on hemodialysis 07/01/2016    Abnormal uterine bleeding 02/15/2016    History of venous thromboembolism 02/14/2016    Pulmonary embolus ([place] [address] ) 10/30/2015    Cervical dysplasia 05/03/2015    Chronic endometritis 10/17/2014    Tinea pedis 10/02/2014    Benign neoplasm of skin 09/25/2014    Type 2 diabetes mellitus ([place] [address] ) 09/04/2014    Phlebitis and thrombophlebitis of superficial vessels of lower extremities 04/10/2014    Limb pain 11/25/2013    Mononeuritis of upper limb, unspecified laterality 11/25/2013    Legal blindness, as defined in [place] 73/30/7911    Complication from renal dialysis device 04/22/2013    Essential hypertension 10/15/2012    Cardiomyopathy ([place] [address] ) 09/26/2012    Esophageal reflux 08/20/2012    Allergic rhinitis 08/20/2012      LOS (days): 2  Geometric Mean LOS (GMLOS) (days): 3 80  Days to GMLOS:1 7     OBJECTIVE:  Risk of Unplanned Readmission Score: 53 06         Current admission status: Inpatient   Preferred Pharmacy:   [facility]/pharmacy #[id number]illen - [address]  [address] [place] [address] [address] [address]  Phone: [phone] Fax: [address] [facility], [address]  [address] [address]  Phone: [phone] Fax: [phone]    Primary Care Provider: Elias Fernandez MD    Primary Insurance: MEDICARE  Secondary Insurance: [facility] [address] [employer]    DISCHARGE DETAILS:          Comments - Freedom of Choice: Met with patient and her boyfriend entered the room towords the end of hte conversation  PT mainor unsure if she wants to Go to [facility]  SHe asked about going home with therapy  CM stated that that decision would be made by the MD  Pt handed CM her MA appication and asked CM to assist filling it out  CM explained that we could assist the patient but not fillit out for her  CM faxed the compleated form to MA

## 2022-08-15 NOTE — PROGRESS NOTES
NEPHROLOGY PROGRESS NOTE   Haleigh Cummins 56 y o  female MRN: [id number]  Unit/Bed#: -01 Encounter: [id number]    ASSESSMENT & PLAN:  61-year-old female known ESRD on HD TTS presented with status post fall and back pain  Recently discharged on 08/04 and had back pain at that time and there was concern for diskitis blood cultures were negative, was discharged on doxycycline given suspected cellulitis of left wrist   End-stage renal disease on dialysis  -Outpatient unit:  [address]  -Schedule:  TTS  -dry weight 123 kg  -Access:  Left AV fistula  -Plan:  HD treatment tomorrow  She did have a shortened treatment on Saturday but clinically appears euvolemic and is oriented x3, no urgent indication for HD today    Volume status  -clinically euvolemic  Continue to monitor    CKD/MBD  -hypocalcemia:  Sensipar held since admission  Monitor calcium level, calcium level improving today to 9 1 from 6 8 on 08/12  If Calcium level remains stable may be able to restart Sensipar  -hyperphosphatemia:  Continue Renagel  Previous phosphorus level was 6 3 on 08/04    Blood pressure  -primary hypertension:  Blood pressure currently low  On Coreg 25 mg twice daily and nifedipine 30 mg daily  Stop further nifedipine on 08/15  Adjusted hold parameter for Coreg    Electrolytes:   -stable    Anemia due to ESRD  -Goal hemoglobin:  10-11 grams/deciliter  -Current hemoglobin:  Below goal at 8 6 g/dl  -Plan:  Continue to monitor  Not on MITCHELL due to history of PE  Status post fall with right-sided hip pain:  Imaging study without any bony abnormalities:  Management per primary team     Acute encephalopathy, suspected due to component of polypharmacy  - still on Lyrica  May consider stopping Lyrica if mental status worsens    Chronic back pain:  Recent MRI showing L4-L5 osteomyelitis  Blood culture during recent hospital admission was negative      Diabetes mellitus type 2:  Management per primary team    History of PE, currently on Coumadin    Left wrist cellulitis, completed doxycycline course on 8/14    Discussed with primary team        SUBJECTIVE:  Complaining of right lower abdominal pain  No chest pain or SOB  OBJECTIVE:  Current Weight: Weight - Scale: 127 kg (280 lb)  Vitals:    08/15/22 0812   BP: 96/70   Pulse:    Resp:    Temp:    SpO2:        Intake/Output Summary (Last 24 hours) at 8/15/2022 0851  Last data filed at 8/14/2022 0900  Gross per 24 hour   Intake 100 ml   Output --   Net 100 ml       Physical Exam  General:  Ill looking, awake  Eyes: Conjunctivae pink,  Sclera anicteric  ENT: lips and mucous membranes moist  Neck: supple   Chest: Clear to Auscultation both lungs,  no crackles, ronchus or wheezing  CVS: S1 & S2 present, normal rate, regular rhythm, no murmur    Abdomen: soft, non-tender, non-distended, Bowel sounds normoactive  Extremities: no edema of  legs  Skin: no rash  Neuro: awake, alert, oriented x3  Psych: Mood and affect appropriate     Medications:    Current Facility-Administered Medications:     acetaminophen (TYLENOL) tablet 650 mg, 650 mg, Oral, Q6H PRN, Chio Snell MD    albuterol (PROVENTIL HFA,VENTOLIN HFA) inhaler 2 puff, 2 puff, Inhalation, Q6H PRN, Chio Snell MD    ALPRAZolam Mohamud Glance) tablet 0 25 mg, 0 25 mg, Oral, BID PRN, Chio Snell MD    atorvastatin (LIPITOR) tablet 20 mg, 20 mg, Oral, Daily With Arnol Barragan MD, 20 mg at 08/14/22 1646    b complex-vitamin C-folic acid (NEPHROCAPS) capsule 1 capsule, 1 capsule, Oral, Daily With Dinner, Chio Snell MD, 1 capsule at 08/14/22 1645    carvedilol (COREG) tablet 25 mg, 25 mg, Oral, BID, Chio Snell MD, 25 mg at 08/14/22 1645    dicyclomine (BENTYL) capsule 10 mg, 10 mg, Oral, TID PRN, Chio Snell MD    HYDROmorphone (DILAUDID) injection 0 5 mg, 0 5 mg, Intravenous, Q3H PRN, Perla Tidwell PA-C, 0 5 mg at 08/14/22 1847    insulin glargine (LANTUS) subcutaneous injection 8 Units 0 08 mL, 8 Units, Subcutaneous, HS, Darryl Roberts MD, 8 Units at 08/14/22 2116    insulin lispro (HumaLOG) 100 units/mL subcutaneous injection 1-5 Units, 1-5 Units, Subcutaneous, TID AC **AND** Fingerstick Glucose (POCT), , , TID AC, Darryl Roberts MD    insulin lispro (HumaLOG) 100 units/mL subcutaneous injection 1-5 Units, 1-5 Units, Subcutaneous, HS, Darryl Roberts MD    insulin lispro (HumaLOG) 100 units/mL subcutaneous injection 3 Units, 3 Units, Subcutaneous, TID With Meals, Darryl Roberts MD    levothyroxine tablet 50 mcg, 50 mcg, Oral, Early Morning, Darryl Roberts MD, 50 mcg at 08/15/22 0524    lidocaine (LIDODERM) 5 % patch 1 patch, 1 patch, Topical, Daily, Darryl Roberts MD, 1 patch at 08/15/22 0815    loratadine (CLARITIN) tablet 10 mg, 10 mg, Oral, Daily, Darryl Roberts MD, 10 mg at 08/15/22 0814    melatonin tablet 3 mg, 3 mg, Oral, HS, Darryl Roberts MD, 3 mg at 08/14/22 2116    menthol-methyl salicylate (BENGAY) 47-00 % cream, , Apply externally, 4x Daily PRN, Lisa Rosales PA-C, Given at 08/13/22 0601    metoclopramide (REGLAN) injection 5 mg, 5 mg, Intravenous, Q6H PRN, Perla Tidwell PA-C    NIFEdipine (PROCARDIA XL) 24 hr tablet 30 mg, 30 mg, Oral, Daily, Darryl Roberts MD, 30 mg at 08/14/22 0845    nystatin (MYCOSTATIN) powder, , Topical, BID, Darryl Roberts MD, Given at 08/15/22 0814    oxyCODONE-acetaminophen (PERCOCET) 5-325 mg per tablet 2 tablet, 2 tablet, Oral, Q6H PRN, Perla Tidwell PA-C, 2 tablet at 08/15/22 0538    pantoprazole (PROTONIX) EC tablet 40 mg, 40 mg, Oral, Early Morning, Darryl Roberts MD, 40 mg at 08/15/22 0524    polyethylene glycol (MIRALAX) packet 17 g, 17 g, Oral, Daily, Darryl Roberts MD, 17 g at 08/15/22 0814    pregabalin (LYRICA) capsule 50 mg, 50 mg, Oral, Daily, Daryrl Roberts MD, 50 mg at 08/15/22 1194    senna (SENOKOT) tablet 17 2 mg, 2 tablet, Oral, HS PRN, Darryl Roberts MD    [facility]) tablet 8 6 mg, 1 tablet, Oral, Daily, Darryl Roberts MD, 8 6 mg at 08/15/22 0814    sertraline (ZOLOFT) tablet 75 mg, 75 mg, Oral, Daily, Margie Montanez MD, 75 mg at 08/15/22 9432    sevelamer (RENAGEL) tablet 1,600 mg, 1,600 mg, Oral, TID With Meals, Margie Montanez MD, 1,600 mg at 08/15/22 2986    warfarin (COUMADIN) tablet 10 mg, 10 mg, Oral, Daily (warfarin), Perla Tidwell PA-C, 10 mg at 08/14/22 1646    Invasive Devices:        Lab Results:   Results from last 7 days   Lab Units 08/13/22  0959 08/12/22  1515   WBC Thousand/uL 7 28 8 21   HEMOGLOBIN g/dL 8 6* 8 1*   HEMATOCRIT % 27 6* 25 6*   PLATELETS Thousands/uL 208 206   POTASSIUM mmol/L 4 4 5 3   CHLORIDE mmol/L 97 99   CO2 mmol/L 30 29   BUN mg/dL 40* 33*   CREATININE mg/dL 8 49* 7 06*   CALCIUM mg/dL 9 1 6 8*       Previous work up:      Portions of the record may have been created with voice recognition software  Occasional wrong word or "sound a like" substitutions may have occurred due to the inherent limitations of voice recognition software  Read the chart carefully and recognize, using context, where substitutions have occurred  If you have any questions, please contact the dictating provider

## 2022-08-15 NOTE — ASSESSMENT & PLAN NOTE
· Patient came to the hospital after had are being 2 falls at home with right-sided hip/back pain that radiates down her R leg  She was recently in hospital with complaint of similar pain   · No acute osseous abnormality on CT scan  · Pain control -- pt reports her pain feels better today   · PT OT evaluation -- recommending rehab at this time  Pt is unsure if she would want to go to rehab    Recommend repeat PT eval now that pain is better controlled

## 2022-08-15 NOTE — ASSESSMENT & PLAN NOTE
· Monitor blood pressure    Continue with outpatient medication  · Continue with Coreg  · D/c nifedipine as BP soft today   · Continue with torsemide  · Nephrology following

## 2022-08-15 NOTE — ASSESSMENT & PLAN NOTE
· Patient came to the hospital after 2 falls at home  She had a fall on 08/04 and had another fall day of admission    Both the falls when she was trying to sit down and slid down to the floor  · PT OT evaluation recommend rehab -- pt is unsure if she would agree to rehab, she prefers to go home, will f/u with repeat PT evaluations

## 2022-08-15 NOTE — PLAN OF CARE
Problem: OCCUPATIONAL THERAPY ADULT  Goal: Performs self-care activities at highest level of function for planned discharge setting  See evaluation for individualized goals  Description: Treatment Interventions: ADL retraining, Functional transfer training, UE strengthening/ROM, Endurance training, Patient/family training, Equipment evaluation/education, Compensatory technique education, Energy conservation, Activityengagement          See flowsheet documentation for full assessment, interventions and recommendations  8/15/2022 1510 by Laurita Retana OT  Note: Limitation: Decreased ADL status, Decreased Safe judgement during ADL, Decreased cognition, Decreased endurance, Decreased self-care trans, Decreased high-level ADLs  Prognosis: Gary Vargas   Pt is a 56 y o  female who was [facility] to [facility] with Fall (2 falls) with right sided pain (no acute changes on imaging)   Pt's problem list also includes PMH of  has a past medical history of Abnormal uterine bleeding (AUB), Anemia, Anxiety, Arthritis, Chronic kidney disease, Claustrophobia, Diabetes mellitus ([address] ), Disease of thyroid gland, DVT (deep venous thrombosis) ([address] ), End stage kidney disease ([address] ), Foot ulcer due to secondary DM ([address] ), Hemodialysis patient ([address] ), Hypertension, 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[address] ), Morbid obesity ([address] ), Osteomyelitis of fifth toe of right foot ([address] ), Panic attacks, Pulmonary embolism ([address] ), Reflux esophagitis, Thrombophlebitis of saphenous vein, and Warfarin anticoagulation    At baseline pt was completing   Pt lives with SO in a [facility] with BARBARA  Currently pt requires min/mod a  for overall ADLS and min a with Rw for functional mobility/transfers  Pt currently presents with impairments in the following categories -steps to enter environment, difficulty performing ADLS and difficulty performing IADLS  activity tolerance and endurance   These impairments, as well as pt's fatigue and pain  limit pt's ability to safely engage in all baseline areas of occupation, includingbathing, dressing, toileting, functional mobility/transfers, community mobility, social participation  and leisure activities  From OT standpoint, recommend STR upon D/C  OT will continue to follow to address the below stated goals        OT Discharge Recommendation: Post acute rehabilitation services

## 2022-08-15 NOTE — ASSESSMENT & PLAN NOTE
· Patient was started on antibiotics during the recent hospital stay due to concern for cellulitis of the right wrist region from previous IV site  · Completed PO Doxycycline course 8/14

## 2022-08-16 ENCOUNTER — APPOINTMENT (INPATIENT)
Dept: DIALYSIS | Facility: HOSPITAL | Age: 57
DRG: 539 | End: 2022-08-16
Payer: MEDICARE

## 2022-08-16 PROBLEM — L03.90 CELLULITIS: Status: RESOLVED | Noted: 2022-08-04 | Resolved: 2022-08-16

## 2022-08-16 LAB
ANION GAP SERPL CALCULATED.3IONS-SCNC: 7 MMOL/L (ref 4–13)
BASOPHILS # BLD AUTO: 0.04 THOUSANDS/ΜL (ref 0–0.1)
BASOPHILS NFR BLD AUTO: 1 % (ref 0–1)
BUN SERPL-MCNC: 55 MG/DL (ref 5–25)
CALCIUM SERPL-MCNC: 8.7 MG/DL (ref 8.3–10.1)
CHLORIDE SERPL-SCNC: 100 MMOL/L (ref 96–108)
CO2 SERPL-SCNC: 29 MMOL/L (ref 21–32)
CREAT SERPL-MCNC: 10.6 MG/DL (ref 0.6–1.3)
EOSINOPHIL # BLD AUTO: 0.25 THOUSAND/ΜL (ref 0–0.61)
EOSINOPHIL NFR BLD AUTO: 3 % (ref 0–6)
ERYTHROCYTE [DISTWIDTH] IN BLOOD BY AUTOMATED COUNT: 14.6 % (ref 11.6–15.1)
GFR SERPL CREATININE-BSD FRML MDRD: 3 ML/MIN/1.73SQ M
GLUCOSE SERPL-MCNC: 103 MG/DL (ref 65–140)
GLUCOSE SERPL-MCNC: 141 MG/DL (ref 65–140)
GLUCOSE SERPL-MCNC: 70 MG/DL (ref 65–140)
GLUCOSE SERPL-MCNC: 75 MG/DL (ref 65–140)
HCT VFR BLD AUTO: 25.9 % (ref 34.8–46.1)
HGB BLD-MCNC: 8 G/DL (ref 11.5–15.4)
IMM GRANULOCYTES # BLD AUTO: 0.05 THOUSAND/UL (ref 0–0.2)
IMM GRANULOCYTES NFR BLD AUTO: 1 % (ref 0–2)
INR PPP: 2.04 (ref 0.84–1.19)
LYMPHOCYTES # BLD AUTO: 1.38 THOUSANDS/ΜL (ref 0.6–4.47)
LYMPHOCYTES NFR BLD AUTO: 18 % (ref 14–44)
MCH RBC QN AUTO: 30.4 PG (ref 26.8–34.3)
MCHC RBC AUTO-ENTMCNC: 30.9 G/DL (ref 31.4–37.4)
MCV RBC AUTO: 99 FL (ref 82–98)
MONOCYTES # BLD AUTO: 0.96 THOUSAND/ΜL (ref 0.17–1.22)
MONOCYTES NFR BLD AUTO: 12 % (ref 4–12)
NEUTROPHILS # BLD AUTO: 5.16 THOUSANDS/ΜL (ref 1.85–7.62)
NEUTS SEG NFR BLD AUTO: 65 % (ref 43–75)
NRBC BLD AUTO-RTO: 0 /100 WBCS
PLATELET # BLD AUTO: 197 THOUSANDS/UL (ref 149–390)
PMV BLD AUTO: 10.5 FL (ref 8.9–12.7)
POTASSIUM SERPL-SCNC: 4.7 MMOL/L (ref 3.5–5.3)
PROTHROMBIN TIME: 23.3 SECONDS (ref 11.6–14.5)
RBC # BLD AUTO: 2.63 MILLION/UL (ref 3.81–5.12)
SODIUM SERPL-SCNC: 136 MMOL/L (ref 135–147)
WBC # BLD AUTO: 7.84 THOUSAND/UL (ref 4.31–10.16)

## 2022-08-16 PROCEDURE — 85025 COMPLETE CBC W/AUTO DIFF WBC: CPT | Performed by: INTERNAL MEDICINE

## 2022-08-16 PROCEDURE — 85610 PROTHROMBIN TIME: CPT | Performed by: FAMILY MEDICINE

## 2022-08-16 PROCEDURE — 80048 BASIC METABOLIC PNL TOTAL CA: CPT | Performed by: INTERNAL MEDICINE

## 2022-08-16 PROCEDURE — 90935 HEMODIALYSIS ONE EVALUATION: CPT | Performed by: INTERNAL MEDICINE

## 2022-08-16 PROCEDURE — 82948 REAGENT STRIP/BLOOD GLUCOSE: CPT

## 2022-08-16 PROCEDURE — 97530 THERAPEUTIC ACTIVITIES: CPT

## 2022-08-16 PROCEDURE — 99232 SBSQ HOSP IP/OBS MODERATE 35: CPT | Performed by: INTERNAL MEDICINE

## 2022-08-16 PROCEDURE — 5A1D70Z PERFORMANCE OF URINARY FILTRATION, INTERMITTENT, LESS THAN 6 HOURS PER DAY: ICD-10-PCS | Performed by: INTERNAL MEDICINE

## 2022-08-16 PROCEDURE — 97116 GAIT TRAINING THERAPY: CPT

## 2022-08-16 RX ORDER — METHOCARBAMOL 500 MG/1
500 TABLET, FILM COATED ORAL EVERY 6 HOURS SCHEDULED
Status: DISCONTINUED | OUTPATIENT
Start: 2022-08-16 | End: 2022-08-21 | Stop reason: HOSPADM

## 2022-08-16 RX ORDER — ACETAMINOPHEN 325 MG/1
975 TABLET ORAL EVERY 8 HOURS SCHEDULED
Status: DISCONTINUED | OUTPATIENT
Start: 2022-08-16 | End: 2022-08-21 | Stop reason: HOSPADM

## 2022-08-16 RX ORDER — OXYCODONE HYDROCHLORIDE 5 MG/1
5 TABLET ORAL EVERY 4 HOURS PRN
Status: DISCONTINUED | OUTPATIENT
Start: 2022-08-16 | End: 2022-08-21 | Stop reason: HOSPADM

## 2022-08-16 RX ORDER — OXYCODONE HYDROCHLORIDE 10 MG/1
10 TABLET ORAL EVERY 4 HOURS PRN
Status: DISCONTINUED | OUTPATIENT
Start: 2022-08-16 | End: 2022-08-21 | Stop reason: HOSPADM

## 2022-08-16 RX ADMIN — ATORVASTATIN CALCIUM 20 MG: 20 TABLET, FILM COATED ORAL at 17:26

## 2022-08-16 RX ADMIN — CARVEDILOL 25 MG: 25 TABLET, FILM COATED ORAL at 17:27

## 2022-08-16 RX ADMIN — INSULIN LISPRO 3 UNITS: 100 INJECTION, SOLUTION INTRAVENOUS; SUBCUTANEOUS at 17:27

## 2022-08-16 RX ADMIN — OXYCODONE HYDROCHLORIDE 10 MG: 10 TABLET ORAL at 22:12

## 2022-08-16 RX ADMIN — NEPHROCAP 1 CAPSULE: 1 CAP ORAL at 17:27

## 2022-08-16 RX ADMIN — MELATONIN 3 MG: at 22:08

## 2022-08-16 RX ADMIN — LIDOCAINE 5% 1 PATCH: 700 PATCH TOPICAL at 08:58

## 2022-08-16 RX ADMIN — SERTRALINE HYDROCHLORIDE 75 MG: 50 TABLET ORAL at 08:50

## 2022-08-16 RX ADMIN — NYSTATIN: 100000 POWDER TOPICAL at 17:28

## 2022-08-16 RX ADMIN — ACETAMINOPHEN 975 MG: 325 TABLET ORAL at 22:08

## 2022-08-16 RX ADMIN — METHOCARBAMOL 500 MG: 500 TABLET ORAL at 17:27

## 2022-08-16 RX ADMIN — LEVOTHYROXINE SODIUM 50 MCG: 75 TABLET ORAL at 05:42

## 2022-08-16 RX ADMIN — WARFARIN SODIUM 10 MG: 5 TABLET ORAL at 17:26

## 2022-08-16 RX ADMIN — LORATADINE 10 MG: 10 TABLET ORAL at 08:52

## 2022-08-16 RX ADMIN — SENNOSIDES 8.6 MG: 8.6 TABLET, FILM COATED ORAL at 08:50

## 2022-08-16 RX ADMIN — OXYCODONE HYDROCHLORIDE 10 MG: 10 TABLET ORAL at 08:57

## 2022-08-16 RX ADMIN — OXYCODONE HYDROCHLORIDE 10 MG: 10 TABLET ORAL at 13:19

## 2022-08-16 RX ADMIN — MENTHOL, METHYL SALICYLATE: 10; 15 CREAM TOPICAL at 17:27

## 2022-08-16 RX ADMIN — OXYCODONE HYDROCHLORIDE 10 MG: 10 TABLET ORAL at 17:27

## 2022-08-16 RX ADMIN — SEVELAMER HYDROCHLORIDE 1600 MG: 800 TABLET ORAL at 17:27

## 2022-08-16 RX ADMIN — INSULIN GLARGINE 8 UNITS: 100 INJECTION, SOLUTION SUBCUTANEOUS at 22:08

## 2022-08-16 RX ADMIN — PREGABALIN 50 MG: 50 CAPSULE ORAL at 08:52

## 2022-08-16 RX ADMIN — PANTOPRAZOLE SODIUM 40 MG: 40 TABLET, DELAYED RELEASE ORAL at 05:42

## 2022-08-16 RX ADMIN — SEVELAMER HYDROCHLORIDE 1600 MG: 800 TABLET ORAL at 08:50

## 2022-08-16 NOTE — ASSESSMENT & PLAN NOTE
Pt and SO report persistent nausea and vomiting for "a while "  Pt was recommended to have gastric emptying study after EGD in November of 2021, this was not done   · Recommend she be evaluated for suspected gastroparesis  · PRN antiemetic  · KUB negative    Pt had BM 8/14 with improvement of abdominal pain and nausea

## 2022-08-16 NOTE — PROGRESS NOTES
[facility]  Progress Note Justa Speaks 1965, 56 y o  female MRN: [id number]  Unit/Bed#: -Jaspreet Encounter: [id number]  Primary Care Provider: Kobi Ferrera MD   Date and time admitted to hospital: 8/12/2022 11:43 AM    * [address]  Patient came to the hospital after 2 falls at home with right-sided hip/back pain that radiates down her R leg  She was recently in hospital with complaint of similar pain   · No acute osseous abnormality on CT scan  · Pain control -- pt reports her pain feels mildly better today  Will make following adjustments 8/16:  · Change tylenol to scheduled   · DC percocet, change to oxy IR  · DC IV Dilaudid--no indication for IV narcotics  · Continue with lyrica, lidoderm patch  · Order Aqua K  · Add robaxin 500 mg q6h  · PT/OT evaluation -- recommending rehab at this time, patient agreeable  CM to obtain choices     Chronic pain syndrome  Assessment & Plan  Followed by pain management as outpatient  · Appears she was briefly on short course of Percocet but does not appear to have chornic narcotics  · Seen by APS on recent admission  · Pain regimen as above    ESRD on hemodialysis  Assessment & Plan  Lab Results   Component Value Date    EGFR 3 08/16/2022    EGFR 4 08/13/2022    EGFR 5 08/12/2022    CREATININE 10 60 (H) 08/16/2022    CREATININE 8 49 (H) 08/13/2022    CREATININE 7 06 (H) 08/12/2022     · Patient gets dialysis on Tuesday Thursday Saturday      · Continue with Nephrocaps and sensipar and Renagel  · Nephrology consulted     Cellulitis-resolved as of 8/16/2022  Assessment & Plan  Patient was started on antibiotics during the recent hospital stay due to concern for cellulitis of the right wrist region from previous IV site  · Completed PO Doxycycline course 8/14    Concern for Osteomyelitis/Discitis of lumbar region  Assessment & Plan  Patient came to the hospital recently with back pain and was evaluated by Neurosurgery and Infectious Disease  · No plan for any biopsy or surgical intervention    Chronic anticoagulation  Assessment & Plan  Patient is on Coumadin as outpatient  · Patient reported that she did not take Coumadin previous day of admission   · On 9 mg of Coumadin  INR was subtherapeutic -- repeat now WNL    Pulmonary embolus [facility])  Assessment & Plan  Patient with previous history of PE  · Questionable compliance  · Continue coumadin   · INR therapeutic    Acquired hypothyroidism  Assessment & Plan  · Continue Synthroid    Type 2 diabetes mellitus [facility])  Assessment & Plan  Lab Results   Component Value Date    HGBA1C 9 4 (H) 07/09/2022       Recent Labs     08/15/22  1104 08/15/22  1613 08/15/22  2106 08/16/22  0614   POCGLU 105 125 158* 75       Blood Sugar Average: Last 72 hrs:  (P) 499 [id number]     · Continue with Lantus and sliding scale at a lower dose  · Monitor blood sugar  · Patient with poor p o  Intake, SO reports that when she eats she gets an upset stomach/vomits frequently  Recommend that she have GI evaluation for gastric emptying study as an outpt to evaluate for gastroparesis -- she was recommended this by GI at EGD 11/2021     Hyperlipidemia  Assessment & Plan  · Continue with statin    Morbid obesity  Assessment & Plan  · TLC as outpatient    Anemia of chronic disease  Assessment & Plan  Hemoglobin at baseline  · Monitor    Secondary hyperparathyroidism of renal origin [facility])  Assessment & Plan  · Continue Sensipar    Nausea and vomiting  Assessment & Plan  Pt and SO report persistent nausea and vomiting for "a while "  Pt was recommended to have gastric emptying study after EGD in November of 2021, this was not done   · Recommend she be evaluated for suspected gastroparesis  · PRN antiemetic  · KUB negative  Pt had BM 8/14 with improvement of abdominal pain and nausea     Anxiety disorder  Assessment & Plan  · Continue with p r n   Xanax    Essential hypertension  Assessment & Plan  BP low normal  · Renal following, input appreciated  · Currently on Coreg 25 mg BID, holding home dose Nifedipine and Demadex        VTE Pharmacologic Prophylaxis: VTE Score: 8 Moderate Risk (Score 3-4) - Pharmacological DVT Prophylaxis Ordered: warfarin (Coumadin)  Patient Centered Rounds: I performed bedside rounds with nursing staff today  Discussions with Specialists or Other Care Team Provider: Case management    Education and Discussions with Family / Patient: Patient declined call to   Time Spent for Care: 20 minutes  More than 50% of total time spent on counseling and coordination of care as described above  Current Length of Stay: 3 day(s)  Current Patient Status: Inpatient   Certification Statement: The patient will continue to require additional inpatient hospital stay due to rehab placement   Discharge Plan: Anticipate discharge in 24-48 hrs to rehab facility  Code Status: Level 1 - Full Code    Subjective:   Pt very hesitant to answer my questions this AM  She appears frustrated that I am discussing discharge plans with her  She reluctantly eventually told me she doesn't think she can go home like this, so she is agreeable to rehab referrals  Objective:     Vitals:   Temp (24hrs), Av °F (36 7 °C), Min:97 7 °F (36 5 °C), Max:98 3 °F (36 8 °C)    Temp:  [97 7 °F (36 5 °C)-98 3 °F (36 8 °C)] 97 7 °F (36 5 °C)  HR:  [68] 68  Resp:  [16] 16  BP: ()/(57-71) 92/71  SpO2:  [94 %-97 %] 94 %  Body mass index is 45 19 kg/m²  Input and Output Summary (last 24 hours): Intake/Output Summary (Last 24 hours) at 2022 0851  Last data filed at 8/15/2022 1700  Gross per 24 hour   Intake 0 ml   Output --   Net 0 ml       Physical Exam:   Physical Exam  Vitals and nursing note reviewed  Constitutional:       Appearance: She is obese  Comments: On RA    Cardiovascular:      Rate and Rhythm: Normal rate and regular rhythm  Pulmonary:      Effort: No respiratory distress  Abdominal:      General: There is no distension  Musculoskeletal:      Right lower leg: Edema present  Left lower leg: Edema present  Neurological:      Mental Status: She is oriented to person, place, and time  Psychiatric:      Comments: Flat, delayed responses  Speech clear           Additional Data:     Labs:  Results from last 7 days   Lab Units 08/16/22  0447   WBC Thousand/uL 7 84   HEMOGLOBIN g/dL 8 0*   HEMATOCRIT % 25 9*   PLATELETS Thousands/uL 197   NEUTROS PCT % 65   LYMPHS PCT % 18   MONOS PCT % 12   EOS PCT % 3     Results from last 7 days   Lab Units 08/16/22  0447   SODIUM mmol/L 136   POTASSIUM mmol/L 4 7   CHLORIDE mmol/L 100   CO2 mmol/L 29   BUN mg/dL 55*   CREATININE mg/dL 10 60*   ANION GAP mmol/L 7   CALCIUM mg/dL 8 7   GLUCOSE RANDOM mg/dL 70     Results from last 7 days   Lab Units 08/16/22  0447   INR  2 04*     Results from last 7 days   Lab Units 08/16/22  0614 08/15/22  2106 08/15/22  1613 08/15/22  1104 08/15/22  0738 08/15/22  0557 08/14/22  2040 08/14/22  1551 08/14/22  1101 08/14/22  0631 08/13/22  2135 08/13/22  1729   POC GLUCOSE mg/dl 75 158* 125 105 89 67 111 93 88 91 117 133               Lines/Drains:  Invasive Devices  Report    Peripheral Intravenous Line  Duration           Peripheral IV 08/12/22 Right Forearm 3 days          Line  Duration           Hemodialysis AV Fistula 02/20/18 Left Forearm 1637 days                      Imaging: No pertinent imaging reviewed      Recent Cultures (last 7 days):         Last 24 Hours Medication List:   Current Facility-Administered Medications   Medication Dose Route Frequency Provider Last Rate    acetaminophen  975 mg Oral Q8H [address] Anne Crews PA-C      albuterol  2 puff Inhalation Q6H PRN Misael Jay MD      ALPRAZolam  0 25 mg Oral BID PRN Misael Jay MD      atorvastatin  20 mg Oral Daily With Nahomy Angela MD      b complex-vitamin C-folic acid  1 capsule Oral Daily With Nahomy Angela MD  carvedilol  25 mg Oral BID Johnny Nguyen MD      dicyclomine  10 mg Oral TID PRN Bhaskar Goff, MD      insulin glargine  8 Units Subcutaneous HS Bhaskar Goff, MD      insulin lispro  1-5 Units Subcutaneous TID [facility] Bhaskar Goff MD      insulin lispro  1-5 Units Subcutaneous HS Bhaskar Goff, MD      insulin lispro  3 Units Subcutaneous TID With Meals Bhaskar Goff, MD      levothyroxine  50 mcg Oral Early Morning Bhaskar Goff, MD      lidocaine  1 patch Topical Daily Bhaskar Goff, MD      loratadine  10 mg Oral Daily Bhaskar Goff, MD      melatonin  3 mg Oral HS Bhaskar Goff, MD      menthol-methyl salicylate   Apply externally 4x Daily PRN Fam Rosales PA-C      methocarbamol  500 mg Oral Q6H [address] Anne Crews PA-C      metoclopramide  5 mg Intravenous Q6H PRN Antoinette Coelho PA-C      nystatin   Topical BID Bhaskar Goff, MD      oxyCODONE  10 mg Oral Q4H PRN Mike Sykes PA-C      oxyCODONE  5 mg Oral Q4H PRN Mike Sykes PA-C      pantoprazole  40 mg Oral Early Morning Bhaskar Goff, MD      polyethylene glycol  17 g Oral Daily Bhaskar Goff, MD      pregabalin  50 mg Oral Daily Bhaskar Goff, MD      senna  2 tablet Oral HS PRN Bhaskar Goff, MD      senna  1 tablet Oral Daily Bhaskar Goff, MD      sertraline  75 mg Oral Daily Bhaskar Goff, MD      sevelamer  1,600 mg Oral TID With Meals Bhaskar Goff, MD      simethicone  80 mg Oral Q6H PRN Ferdinand Cardona DO      warfarin  10 mg Oral Daily (warfarin) Antoinette Coelho PA-C          Today, Patient Was Seen By: Mike Sykes PA-C    **Please Note: This note may have been constructed using a voice recognition system  **

## 2022-08-16 NOTE — ASSESSMENT & PLAN NOTE
Patient with previous history of PE  · Questionable compliance  · Continue coumadin   · INR therapeutic

## 2022-08-16 NOTE — ASSESSMENT & PLAN NOTE
Lab Results   Component Value Date    HGBA1C 9 4 (H) 07/09/2022       Recent Labs     08/15/22  1104 08/15/22  1613 08/15/22  2106 08/16/22  0614   POCGLU 105 125 158* 75       Blood Sugar Average: Last 72 hrs:  (P) 487 [id number]     · Continue with Lantus and sliding scale at a lower dose  · Monitor blood sugar  · Patient with poor p o  Intake, SO reports that when she eats she gets an upset stomach/vomits frequently    Recommend that she have GI evaluation for gastric emptying study as an outpt to evaluate for gastroparesis -- she was recommended this by GI at EGD 11/2021

## 2022-08-16 NOTE — ARC ADMISSION
Received referral on patient for possible ARC placement  Upon review of patients case with [facility] physician, patient deemed not ARC appropriate at this time  Slower paced therapies recommended  CM made aware  Thank you,   ARC Admissions  Kylee ELLINGTON/MARYSE

## 2022-08-16 NOTE — ASSESSMENT & PLAN NOTE
Followed by pain management as outpatient  · Appears she was briefly on short course of Percocet but does not appear to have chornic narcotics  · Seen by APS on recent admission  · Pain regimen as above

## 2022-08-16 NOTE — PROGRESS NOTES
NEPHROLOGY PROGRESS NOTE   Castro Oquendo 56 y o  female MRN: [id number]  Unit/Bed#: -01 Encounter: [id number]    ASSESSMENT & PLAN:  65-year-old female known ESRD on HD TTS presented with status post fall and back pain  Recently discharged on 08/04 and had back pain at that time and there was concern for diskitis blood cultures were negative, was discharged on doxycycline given suspected cellulitis of left wrist   End-stage renal disease on dialysis  -Outpatient unit:  [address]  -Schedule:  TTS  -dry weight 123 kg  -Access:  Left AV fistula  -Plan:  Patient was seen and examined hemodialysis treatment today, single visit, tolerating HD  Using 2 K bath and no ultrafiltration, running even   New dry weight around 120 5 kg  Na+ 138 mEq/L   Na+ Modeling No   K+ 2 0   Bicarb 35 mEq/L   Dialyzer F180   BFR-As tolerated to a maximum of: 400ml/min   DFR Other   Other Comments 500 mL/min   Duration of Treatment 3 5 Hours   Dialysate Temperature (C) 36   Dry Weight: 120 5 kg   Fluid Balance Goal  (SELECT AND ENTER AMOUNT): net negative     Volume status  -clinically euvolemic  Continue to monitor    CKD/MBD  -hypocalcemia:  Sensipar held since admission  Monitor calcium level, calcium level improving and today stable at 8 7 mg/dL  If Calcium level remains stable may be able to restart Sensipar at lower dose  -hyperphosphatemia:  Continue Renagel  Previous phosphorus level was 6 3 on 08/04    Blood pressure  -primary hypertension:  Blood pressure stable during HD, did drop when more ultrafiltration was done and so running even     -was taken off nifedipine 30 mg daily on 08/15   Currently on Coreg 25 mg twice daily with hold parameters continue to monitor blood pressure   Avoid hypotension   Electrolytes:   -stable    Anemia due to ESRD  -Goal hemoglobin:  10-11 grams/deciliter  -Current hemoglobin:  Below goal at 8 0 g/dl  -Plan:  Continue to monitor  Not on MITCHELL due to history of PE      Status post fall with right-sided hip pain:  Imaging study without any bony abnormalities:  Management per primary team   Overall hip pain improving    Acute encephalopathy, suspected due to component of polypharmacy  - still on Lyrica  May consider stopping Lyrica if mental status worsens    Chronic back pain:  Recent MRI showing L4-L5 osteomyelitis  Blood culture during recent hospital admission was negative  Diabetes mellitus type 2:  Management per primary team    History of PE, currently on Coumadin    Left wrist cellulitis, completed doxycycline course on 8/14    Discussed with primary team AP  Planning for rehab placement        SUBJECTIVE:  Complaining of right lower abdominal pain  No chest pain or SOB  OBJECTIVE:  Current Weight: Weight - Scale: 127 kg (280 lb)  Vitals:    08/16/22 1100   BP: (!) 129/102   Pulse: 92   Resp: 18   Temp:    SpO2:        Intake/Output Summary (Last 24 hours) at 8/16/2022 1129  Last data filed at 8/16/2022 0908  Gross per 24 hour   Intake 200 ml   Output --   Net 200 ml       Physical Exam  General:  Ill looking, awake  Eyes: Conjunctivae pink,  Sclera anicteric  ENT: lips and mucous membranes moist  Neck: supple   Chest: Clear to Auscultation both lungs,  no crackles, ronchus or wheezing  CVS: S1 & S2 present, normal rate, regular rhythm, no murmur    Abdomen: soft, non-tender, non-distended, Bowel sounds normoactive  Extremities: no edema of  legs  Skin: no rash  Neuro: awake, alert, oriented x3  Psych: Mood and affect appropriate     Medications:    Current Facility-Administered Medications:     acetaminophen (TYLENOL) tablet 975 mg, 975 mg, Oral, Q8H Anne GEORGE PA-C    albuterol (PROVENTIL HFA,VENTOLIN HFA) inhaler 2 puff, 2 puff, Inhalation, Q6H PRN, Jose Bee MD    ALPRAZolam Jimmy Lions) tablet 0 25 mg, 0 25 mg, Oral, BID PRN, Jose Bee MD    atorvastatin (LIPITOR) tablet 20 mg, 20 mg, Oral, Daily With Greg Dunn MD, 20 mg at 08/15/22 1701 [facility]  b complex-vitamin C-folic acid (NEPHROCAPS) capsule 1 capsule, 1 capsule, Oral, Daily With Fiorella Johnson MD, 1 capsule at 08/15/22 1734    carvedilol (COREG) tablet 25 mg, 25 mg, Oral, BID, Matty Bran MD    dicyclomine (BENTYL) capsule 10 mg, 10 mg, Oral, TID PRN, Louis Jerome MD    insulin glargine (LANTUS) subcutaneous injection 8 Units 0 08 mL, 8 Units, Subcutaneous, HS, Louis Jerome MD, 8 Units at 08/15/22 2127    insulin lispro (HumaLOG) 100 units/mL subcutaneous injection 1-5 Units, 1-5 Units, Subcutaneous, TID AC **AND** Fingerstick Glucose (POCT), , , TID AC, Louis Jerome MD    insulin lispro (HumaLOG) 100 units/mL subcutaneous injection 1-5 Units, 1-5 Units, Subcutaneous, HS, Louis Jerome MD, 1 Units at 08/15/22 2129    insulin lispro (HumaLOG) 100 units/mL subcutaneous injection 3 Units, 3 Units, Subcutaneous, TID With Meals, Louis Jerome MD    levothyroxine tablet 50 mcg, 50 mcg, Oral, Early Morning, Loius Jerome MD, 50 mcg at 08/16/22 0542    lidocaine (LIDODERM) 5 % patch 1 patch, 1 patch, Topical, Daily, Louis Jerome MD, 1 patch at 08/16/22 0858    loratadine (CLARITIN) tablet 10 mg, 10 mg, Oral, Daily, Louis Jerome MD, 10 mg at 08/16/22 1947    melatonin tablet 3 mg, 3 mg, Oral, HS, Louis Jerome MD, 3 mg at 08/15/22 2128    menthol-methyl salicylate (BENGAY) 90-76 % cream, , Apply externally, 4x Daily PRN, Jef Rosales PA-C, Given at 08/15/22 2134    methocarbamol (ROBAXIN) tablet 500 mg, 500 mg, Oral, Q6H [address], Anne Crews PA-C    metoclopramide (REGLAN) injection 5 mg, 5 mg, Intravenous, Q6H PRN, Perla Tidwell PA-C    nystatin (MYCOSTATIN) powder, , Topical, BID, Louis Jerome MD, Given at 08/15/22 1737    oxyCODONE (ROXICODONE) immediate release tablet 10 mg, 10 mg, Oral, Q4H PRN, Zenobia Wolfe PA-C, 10 mg at 08/16/22 0857    oxyCODONE (ROXICODONE) IR tablet 5 mg, 5 mg, Oral, Q4H PRN, Zenobia Wolfe PA-C    pantoprazole (PROTONIX) EC tablet 40 mg, 40 mg, Oral, Early Morning, Radha Pierre MD, 40 mg at 08/16/22 0542    polyethylene glycol (MIRALAX) packet 17 g, 17 g, Oral, Daily, Radha Pierre MD, 17 g at 08/15/22 0814    pregabalin (LYRICA) capsule 50 mg, 50 mg, Oral, Daily, Radha Pierre MD, 50 mg at 08/16/22 7822    senna (SENOKOT) tablet 17 2 mg, 2 tablet, Oral, HS PRN, Radha Pierre MD    [facility]) tablet 8 6 mg, 1 tablet, Oral, Daily, Radha Pierre MD, 8 6 mg at 08/16/22 0850    sertraline (ZOLOFT) tablet 75 mg, 75 mg, Oral, Daily, Radha Pierre MD, 75 mg at 08/16/22 0850    sevelamer (RENAGEL) tablet 1,600 mg, 1,600 mg, Oral, TID With Meals, Radha Pierre MD, 1,600 mg at 08/16/22 0850    simethicone (MYLICON) chewable tablet 80 mg, 80 mg, Oral, Q6H PRN, Ferdinand Cardona DO    warfarin (COUMADIN) tablet 10 mg, 10 mg, Oral, Daily (warfarin), Perla Tidwell PA-C, 10 mg at 08/15/22 1735    Invasive Devices:        Lab Results:   Results from last 7 days   Lab Units 08/16/22  0447 08/13/22  0959 08/12/22  1515   WBC Thousand/uL 7 84 7 28 8 21   HEMOGLOBIN g/dL 8 0* 8 6* 8 1*   HEMATOCRIT % 25 9* 27 6* 25 6*   PLATELETS Thousands/uL 197 208 206   POTASSIUM mmol/L 4 7 4 4 5 3   CHLORIDE mmol/L 100 97 99   CO2 mmol/L 29 30 29   BUN mg/dL 55* 40* 33*   CREATININE mg/dL 10 60* 8 49* 7 06*   CALCIUM mg/dL 8 7 9 1 6 8*       Previous work up:      Portions of the record may have been created with voice recognition software  Occasional wrong word or "sound a like" substitutions may have occurred due to the inherent limitations of voice recognition software  Read the chart carefully and recognize, using context, where substitutions have occurred  If you have any questions, please contact the dictating provider

## 2022-08-16 NOTE — PLAN OF CARE
Patient tolerated treatment well  Post-Dialysis RN Treatment Note    Blood Pressure:  Pre 156/64 mm/Hg  Post 162/70 mmHg   EDW  135 5 kg    Weight:  Pre 120 4 kg   Post 118 4 kg   Mode of weight measurement: Standing Scale   Volume Removed  500 ml    Treatment duration 210 minutes    NS given  No    Treatment shortened? No   Medications given during Rx Not Applicable   Estimated Kt/V  Not Applicable   Access type: AV fistula   Access Issues: No    Report called to primary nurse   Yes, Yen Witt RN      Care plan reviewed for a treatment plan of 3 5 hours with net UF 500ml as tolerated  Serum potassium 4 7 from 8/16/22 on a 2k/2 5ca bath      Problem: METABOLIC, FLUID AND ELECTROLYTES - ADULT  Goal: Electrolytes maintained within normal limits  Description: INTERVENTIONS:  - Monitor labs and assess patient for signs and symptoms of electrolyte imbalances  - Administer electrolyte replacement as ordered  - Monitor response to electrolyte replacements, including repeat lab results as appropriate  - Instruct patient on fluid and nutrition as appropriate  Outcome: Progressing  Goal: Fluid balance maintained  Description: INTERVENTIONS:  - Monitor labs   - Monitor I/O and WT  - Instruct patient on fluid and nutrition as appropriate  - Assess for signs & symptoms of volume excess or deficit  Outcome: Progressing  Goal: Glucose maintained within target range  Description: INTERVENTIONS:  - Monitor Blood Glucose as ordered  - Assess for signs and symptoms of hyperglycemia and hypoglycemia  - Administer ordered medications to maintain glucose within target range  - Assess nutritional intake and initiate nutrition service referral as needed  Outcome: Progressing

## 2022-08-16 NOTE — PLAN OF CARE
Problem: PHYSICAL THERAPY ADULT  Goal: Performs mobility at highest level of function for planned discharge setting  See evaluation for individualized goals  Description: Treatment/Interventions: Functional transfer training, LE strengthening/ROM, Therapeutic exercise, Elevations, Endurance training, Patient/family training, Equipment eval/education, Bed mobility, Gait training          See flowsheet documentation for full assessment, interventions and recommendations  Outcome: Progressing  Note: Prognosis: Fair  Problem List: Decreased strength, Decreased endurance, Impaired balance, Decreased mobility, Decreased coordination, Pain  Assessment: Pt seen for session for setup, time spent EOB, transfers, gait w/ rest time, repositioning  Pt cooperative, willing to participate  c/o decrease in overall pain  Pt able to initiate gait today  Limited by pain, endurance, but overall does show functional improvement  continue to recommend rehab at dc        PT Discharge Recommendation: Post acute rehabilitation services    See flowsheet documentation for full assessment

## 2022-08-16 NOTE — ASSESSMENT & PLAN NOTE
Patient came to the hospital after 2 falls at home with right-sided hip/back pain that radiates down her R leg  She was recently in hospital with complaint of similar pain   · No acute osseous abnormality on CT scan  · Pain control -- pt reports her pain feels mildly better today  Will make following adjustments 8/16:  · Change tylenol to scheduled   · DC percocet, change to oxy IR  · DC IV Dilaudid--no indication for IV narcotics  · Continue with lyrica, lidoderm patch  · Order Aqua K  · Add robaxin 500 mg q6h  · PT/OT evaluation -- recommending rehab at this time, patient agreeable   CM to obtain choices

## 2022-08-16 NOTE — ASSESSMENT & PLAN NOTE
BP low normal  · Renal following, input appreciated  · Currently on Coreg 25 mg BID, holding home dose Nifedipine and Demadex

## 2022-08-16 NOTE — ASSESSMENT & PLAN NOTE
Patient was started on antibiotics during the recent hospital stay due to concern for cellulitis of the right wrist region from previous IV site  · Completed PO Doxycycline course 8/14

## 2022-08-16 NOTE — ASSESSMENT & PLAN NOTE
Monitor blood pressure    Continue with outpatient medication  · Continue with Coreg  · D/c nifedipine as BP soft today   · Continue with torsemide  · Nephrology following

## 2022-08-16 NOTE — ASSESSMENT & PLAN NOTE
Patient is on Coumadin as outpatient  · Patient reported that she did not take Coumadin previous day of admission   · On 9 mg of Coumadin    INR was subtherapeutic -- repeat now WNL

## 2022-08-16 NOTE — PHYSICAL THERAPY NOTE
Physical Therapy Treatment Note       08/16/22 1415   PT Last Visit   PT Visit Date 08/16/22   Note Type   Note Type Treatment   Pain Assessment   Pain Assessment Tool 0-10   Pain Score 6   Pain Location/Orientation Location: Back; Location: Hip   Patient's Stated Pain Goal No pain   Hospital Pain Intervention(s) Ambulation/increased activity   Restrictions/Precautions   Weight Bearing Precautions Per Order No   Other Precautions Pain; Fall Risk;Multiple lines   General   Chart Reviewed Yes   Family/Caregiver Present No   Cognition   Overall Cognitive Status Impaired   Arousal/Participation Responsive   Attention Attends with cues to redirect   Orientation Level Oriented X4   Memory Unable to assess   Following Commands Follows one step commands without difficulty   Subjective   Subjective cooperative w/ session, continues to be limited by pain but improved since last session   Bed Mobility   Supine to Sit   (seated on EOB on arrival)   Additional Comments time spent EOB to assist in donning B shoes   Transfers   Sit to Stand 4  Minimal assistance   Additional items Assist x 1   Stand to Sit 4  Minimal assistance   Additional items Assist x 1   Ambulation/Elevation   Gait pattern   (slow, wide REGINA, antalgic)   Gait Assistance 4  Minimal assist   Additional items Assist x 1   Assistive Device Rolling walker   Distance 20'x2, standing rest x 1-2 min  Balance   Static Sitting Good   Dynamic Sitting Fair -   Static Standing Poor +   Dynamic Standing Poor +   Ambulatory Poor +   Endurance Deficit   Endurance Deficit Yes   Endurance Deficit Description fatigue, weakness, pain   Activity Tolerance   Activity Tolerance Patient tolerated treatment well;Patient limited by fatigue;Patient limited by pain;Treatment limited secondary to medical complications (Comment)   Nurse Made Aware yes   Assessment   Prognosis Fair   Problem List Decreased strength;Decreased endurance; Impaired balance;Decreased mobility; Decreased coordination;Pain   Assessment Pt seen for session for setup, time spent EOB, transfers, gait w/ rest time, repositioning  Pt cooperative, willing to participate  c/o decrease in overall pain  Pt able to initiate gait today  Limited by pain, endurance, but overall does show functional improvement  continue to recommend rehab at [place]   Goals   Patient Goals to get better and go home   STG Expiration Date 08/28/22   PT Treatment Day 2   Plan   Treatment/Interventions Functional transfer training;LE strengthening/ROM; Therapeutic exercise; Endurance training;Equipment eval/education; Bed mobility;Gait training;Patient/family training   Progress Progressing toward goals   PT Frequency 3-5x/wk   Recommendation   PT Discharge Recommendation Post acute rehabilitation services   AM-PAC Basic Mobility Inpatient   Turning in Bed Without Bedrails 4   Lying on Back to Sitting on Edge of Flat Bed 3   Moving Bed to Chair 3   Standing Up From Chair 3   Walk in Room 3   Climb 3-5 Stairs 1   Basic Mobility Inpatient Raw Score 17   Basic Mobility Standardized Score 39 67   Highest Level Of Mobility   JH-HLM Goal 5: Stand one or more mins   JH-HLM Achieved 6: Walk 10 steps or more   Ngoc Faye PT, DPT CSRS

## 2022-08-16 NOTE — ASSESSMENT & PLAN NOTE
Patient came to the hospital recently with back pain and was evaluated by Neurosurgery and Infectious Disease  · No plan for any biopsy or surgical intervention

## 2022-08-16 NOTE — ASSESSMENT & PLAN NOTE
Lab Results   Component Value Date    EGFR 3 08/16/2022    EGFR 4 08/13/2022    EGFR 5 08/12/2022    CREATININE 10 60 (H) 08/16/2022    CREATININE 8 49 (H) 08/13/2022    CREATININE 7 06 (H) 08/12/2022     · Patient gets dialysis on Tuesday Thursday Saturday      · Continue with Nephrocaps and sensipar and Renagel  · Nephrology consulted

## 2022-08-17 LAB
GLUCOSE SERPL-MCNC: 102 MG/DL (ref 65–140)
GLUCOSE SERPL-MCNC: 148 MG/DL (ref 65–140)
GLUCOSE SERPL-MCNC: 79 MG/DL (ref 65–140)
GLUCOSE SERPL-MCNC: 82 MG/DL (ref 65–140)
INR PPP: 2.36 (ref 0.84–1.19)
PROTHROMBIN TIME: 26.1 SECONDS (ref 11.6–14.5)

## 2022-08-17 PROCEDURE — 99232 SBSQ HOSP IP/OBS MODERATE 35: CPT | Performed by: PHYSICIAN ASSISTANT

## 2022-08-17 PROCEDURE — 99232 SBSQ HOSP IP/OBS MODERATE 35: CPT | Performed by: INTERNAL MEDICINE

## 2022-08-17 PROCEDURE — 85610 PROTHROMBIN TIME: CPT | Performed by: INTERNAL MEDICINE

## 2022-08-17 PROCEDURE — 82948 REAGENT STRIP/BLOOD GLUCOSE: CPT

## 2022-08-17 RX ADMIN — MELATONIN 3 MG: at 21:34

## 2022-08-17 RX ADMIN — SIMETHICONE 80 MG: 80 TABLET, CHEWABLE ORAL at 11:55

## 2022-08-17 RX ADMIN — OXYCODONE HYDROCHLORIDE 10 MG: 10 TABLET ORAL at 16:04

## 2022-08-17 RX ADMIN — SERTRALINE HYDROCHLORIDE 75 MG: 50 TABLET ORAL at 09:32

## 2022-08-17 RX ADMIN — LEVOTHYROXINE SODIUM 50 MCG: 75 TABLET ORAL at 05:23

## 2022-08-17 RX ADMIN — OXYCODONE HYDROCHLORIDE 10 MG: 10 TABLET ORAL at 09:33

## 2022-08-17 RX ADMIN — SEVELAMER HYDROCHLORIDE 1600 MG: 800 TABLET ORAL at 09:35

## 2022-08-17 RX ADMIN — METHOCARBAMOL 500 MG: 500 TABLET ORAL at 05:23

## 2022-08-17 RX ADMIN — LIDOCAINE 5% 1 PATCH: 700 PATCH TOPICAL at 09:32

## 2022-08-17 RX ADMIN — INSULIN GLARGINE 8 UNITS: 100 INJECTION, SOLUTION SUBCUTANEOUS at 21:34

## 2022-08-17 RX ADMIN — METHOCARBAMOL 500 MG: 500 TABLET ORAL at 17:48

## 2022-08-17 RX ADMIN — ATORVASTATIN CALCIUM 20 MG: 20 TABLET, FILM COATED ORAL at 17:48

## 2022-08-17 RX ADMIN — NEPHROCAP 1 CAPSULE: 1 CAP ORAL at 17:48

## 2022-08-17 RX ADMIN — CARVEDILOL 25 MG: 25 TABLET, FILM COATED ORAL at 17:48

## 2022-08-17 RX ADMIN — CARVEDILOL 25 MG: 25 TABLET, FILM COATED ORAL at 09:32

## 2022-08-17 RX ADMIN — NYSTATIN: 100000 POWDER TOPICAL at 09:33

## 2022-08-17 RX ADMIN — ACETAMINOPHEN 975 MG: 325 TABLET ORAL at 16:04

## 2022-08-17 RX ADMIN — PREGABALIN 50 MG: 50 CAPSULE ORAL at 09:32

## 2022-08-17 RX ADMIN — SEVELAMER HYDROCHLORIDE 1600 MG: 800 TABLET ORAL at 17:48

## 2022-08-17 RX ADMIN — ACETAMINOPHEN 975 MG: 325 TABLET ORAL at 05:22

## 2022-08-17 RX ADMIN — SENNOSIDES 8.6 MG: 8.6 TABLET, FILM COATED ORAL at 09:32

## 2022-08-17 RX ADMIN — POLYETHYLENE GLYCOL 3350 17 G: 17 POWDER, FOR SOLUTION ORAL at 09:33

## 2022-08-17 RX ADMIN — LORATADINE 10 MG: 10 TABLET ORAL at 09:32

## 2022-08-17 RX ADMIN — ACETAMINOPHEN 975 MG: 325 TABLET ORAL at 21:34

## 2022-08-17 RX ADMIN — NYSTATIN: 100000 POWDER TOPICAL at 17:48

## 2022-08-17 RX ADMIN — WARFARIN SODIUM 10 MG: 5 TABLET ORAL at 17:48

## 2022-08-17 RX ADMIN — DICYCLOMINE HYDROCHLORIDE 10 MG: 10 CAPSULE ORAL at 17:53

## 2022-08-17 RX ADMIN — PANTOPRAZOLE SODIUM 40 MG: 40 TABLET, DELAYED RELEASE ORAL at 05:23

## 2022-08-17 NOTE — CASE MANAGEMENT
Case Management Discharge Planning Note    Patient name Sabino Mercedes  Location /-55 MRN [id number]  : 1965 Date 2022       Current Admission Date: 2022  Current Admission Diagnosis:Fall   Patient Active Problem List    Diagnosis Date Noted    Intractable back pain 2022    Problem with vascular access 2022    Concern for Osteomyelitis/Discitis of lumbar region 2022    Fall 2022    Hypoxia 2022    COVID-19 2022    Right knee pain 2022    Constipation 2022    Pruritus 2021    Postop check 2021    Sigmoid diverticulitis 2021    Pneumonia 2021    Chronic anticoagulation 2021    Arteriovenous fistula for hemodialysis in place, primary ([facility] ) 2021    Healthcare-associated pneumonia 2021    Right foot pain 2021    A-V fistula ([facility] ) 2021    Chronic pain syndrome 2021    Bilateral primary osteoarthritis of knee 2021    Bilateral patellofemoral syndrome 2021    Tinea unguium 2020    S/P foot surgery, right 2020    History of claustrophobia 2020    Morbid obesity 2020    Hyperlipidemia 2020    Diabetic polyneuropathy associated with type 2 diabetes mellitus ([facility] ) 2020    Encounter for diabetic foot exam ([facility] ) 2020    Corns and callosities 2020    History of transmetatarsal amputation of right foot ([facility] ) 2020    Flu-like symptoms 2020    Lumbar radiculopathy 2020    Low back pain with sciatica 2020    Hemodialysis-associated hypotension 2019    Hyponatremia 2019    Parenchymal renal hypertension 2019    Candidiasis of breast 2019    Hyperkalemia 2019    Anemia of chronic disease 2019    Disruption of internal surgical wound 2019    Dyspnea 2019    Secondary hyperparathyroidism of renal origin ([facility] ) 2019    Nausea and vomiting 04/26/2019    [facility] 04/18/2019    Concussion without loss of consciousness 03/15/2019    Colon cancer screening 03/05/2019    Gastroesophageal reflux disease 03/05/2019    Primary osteoarthritis of right knee 10/25/2018    Hemodialysis status ([facility] ) 10/23/2018    Knee pain 10/22/2018    Anxiety disorder 04/06/2017    Acquired hypothyroidism 07/22/2016    Glaucoma 07/01/2016    ESRD on hemodialysis 07/01/2016    Abnormal uterine bleeding 02/15/2016    History of venous thromboembolism 02/14/2016    Pulmonary embolus ([facility] ) 10/30/2015    Cervical dysplasia 05/03/2015    Chronic endometritis 10/17/2014    Tinea pedis 10/02/2014    Benign neoplasm of skin 09/25/2014    Type 2 diabetes mellitus ([facility] ) 09/04/2014    Phlebitis and thrombophlebitis of superficial vessels of lower extremities 04/10/2014    Limb pain 11/25/2013    Mononeuritis of upper limb, unspecified laterality 11/25/2013    Legal blindness, as defined in [place] [facility] 30/23/2974    Complication from renal dialysis device 04/22/2013    Essential hypertension 10/15/2012    Cardiomyopathy ([place] [address] ) 09/26/2012    Esophageal reflux 08/20/2012    Allergic rhinitis 08/20/2012      LOS (days): 4  Geometric Mean LOS (GMLOS) (days): 3 80  Days to GMLOS:-0 2     OBJECTIVE:  Risk of Unplanned Readmission Score: 57 8         Current admission status: Inpatient   Preferred Pharmacy:   [id number] [address]  [address] [place] [address]  Phone: [phone] Fax: [phone] [facility], [address] [place] [address]  [address] [address]  Phone: [phone] Fax: [phone]    Primary Care Provider: Amber Jackson MD    Primary Insurance: MEDICARE  Secondary Insurance: [facility]    DISCHARGE DETAILS:          Comments - Freedom of Choice: Expanded bed search for STR accepting bed   Denied by [facility] juan pablo GS

## 2022-08-17 NOTE — PROGRESS NOTES
NEPHROLOGY PROGRESS NOTE   Yesenia Mccollum 56 y o  female MRN: [id number]  Unit/Bed#: -01 Encounter: [id number]    ASSESSMENT & PLAN:  32-year-old female known ESRD on HD TTS presented with status post fall and back pain  Recently discharged on 08/04 and had back pain at that time and there was concern for diskitis blood cultures were negative, was discharged on doxycycline given suspected cellulitis of left wrist   End-stage renal disease on dialysis  -Outpatient unit:  [address]  -Schedule:  TTS  -dry weight 123 kg, decreased to 120 5 kg on 08/16  -Access:  Left AV fistula  -Plan:  Last HD was on 08/16 and tolerated well  Was ran even  Post dialysis weight was down to 118 4 kg  May need to adjust target weight  Next HD treatment tomorrow    Volume status  -clinically euvolemic  Continue to monitor    CKD/MBD  -hypocalcemia:  Sensipar held since admission and calcium level improving  Last calcium level was 8 7 mg/dL  If Calcium level remains stable may be able to restart Sensipar at lower dose  Will follow-up outpatient labs and decide  hold Sensipar at the time of discharge  -hyperphosphatemia:  Continue Renagel  Previous phosphorus level was 6 3 on 08/04    Blood pressure  -primary hypertension:  Blood pressure acceptable  Avoid hypotension    -was taken off nifedipine 30 mg daily on 08/15   Currently on Coreg 25 mg twice daily with hold parameters continue to monitor blood pressure   If blood pressure trends up may need to restart back on nifedipine    Electrolytes:   -stable    Anemia due to ESRD  -Goal hemoglobin:  10-11 grams/deciliter  -Current hemoglobin:  Below goal at 8 0 g/dl  -Plan:  Continue to monitor  Not on MITCHELL due to history of PE  Status post fall with right-sided hip pain:  Imaging study without any bony abnormalities:  Management per primary team   Overall hip pain improving    Acute encephalopathy, suspected due to component of polypharmacy     - mental status has improved  May consider stopping Lyrica if mental status worsens    Chronic back pain:  Recent MRI showing L4-L5 osteomyelitis  Blood culture during recent hospital admission was negative  Diabetes mellitus type 2:  Management per primary team    History of PE, currently on Coumadin    Left wrist cellulitis, completed doxycycline course on 8/14    Complain of constipation, patient is on MiraLax daily    Discussed with primary team, awaiting rehab placement        SUBJECTIVE:  C/o constipation  Right hip pain improving    OBJECTIVE:  Current Weight: Weight - Scale: 127 kg (280 lb)  Vitals:    08/17/22 0708   BP: 152/61   Pulse:    Resp:    Temp: 98 °F (36 7 °C)   SpO2:        Intake/Output Summary (Last 24 hours) at 8/17/2022 1108  Last data filed at 8/16/2022 1238  Gross per 24 hour   Intake 300 ml   Output 500 ml   Net -200 ml       Physical Exam  General:  Ill looking, awake  Eyes: Conjunctivae pink,  Sclera anicteric  ENT: lips and mucous membranes moist  Neck: supple   Chest: Clear to Auscultation both lungs,  no crackles, ronchus or wheezing  CVS: S1 & S2 present, normal rate, regular rhythm, no murmur    Abdomen: soft, non-tender, non-distended, Bowel sounds normoactive  Extremities: no edema of  legs  Skin: no rash  Neuro: awake, alert, oriented x 3  Psych: Mood and affect appropriate     Medications:    Current Facility-Administered Medications:     acetaminophen (TYLENOL) tablet 975 mg, 975 mg, Oral, Q8H Anne GEORGE PA-C, 975 mg at 08/17/22 0522    albuterol (PROVENTIL HFA,VENTOLIN HFA) inhaler 2 puff, 2 puff, Inhalation, Q6H PRN, Urielfacundo Sneed MD    ALPRAZolam Durel Alt) tablet 0 25 mg, 0 25 mg, Oral, BID PRN, Urielfacundo Sneed MD    atorvastatin (LIPITOR) tablet 20 mg, 20 mg, Oral, Daily With Kip Cerrato MD, 20 mg at 08/16/22 1726    b complex-vitamin C-folic acid (NEPHROCAPS) capsule 1 capsule, 1 capsule, Oral, Daily With Kip Cerrato MD, 1 capsule at 08/16/22 1722   carvedilol (COREG) tablet 25 mg, 25 mg, Oral, BID, Amanda Hunt MD, 25 mg at 08/17/22 0932    dicyclomine (BENTYL) capsule 10 mg, 10 mg, Oral, TID PRN, Mauricio Pickering MD    insulin glargine (LANTUS) subcutaneous injection 8 Units 0 08 mL, 8 Units, Subcutaneous, HS, Mauricio Pickering MD, 8 Units at 08/16/22 2208    insulin lispro (HumaLOG) 100 units/mL subcutaneous injection 1-5 Units, 1-5 Units, Subcutaneous, TID AC **AND** Fingerstick Glucose (POCT), , , TID AC, Mauricio Pickering MD    insulin lispro (HumaLOG) 100 units/mL subcutaneous injection 1-5 Units, 1-5 Units, Subcutaneous, HS, Mauricio Pickering MD, 1 Units at 08/15/22 2129    insulin lispro (HumaLOG) 100 units/mL subcutaneous injection 3 Units, 3 Units, Subcutaneous, TID With Meals, Mauricio Pickering MD, 3 Units at 08/16/22 1727    levothyroxine tablet 50 mcg, 50 mcg, Oral, Early Morning, Mauricio Pickering MD, 50 mcg at 08/17/22 0523    lidocaine (LIDODERM) 5 % patch 1 patch, 1 patch, Topical, Daily, Mauricio Pickering MD, 1 patch at 08/17/22 0932    loratadine (CLARITIN) tablet 10 mg, 10 mg, Oral, Daily, Mauricio Pickering MD, 10 mg at 08/17/22 0932    melatonin tablet 3 mg, 3 mg, Oral, HS, Mauricio Pickering MD, 3 mg at 08/16/22 2208    menthol-methyl salicylate (BENGAY) 06-09 % cream, , Apply externally, 4x Daily PRN, Gumaro Rosales PA-C, Given at 08/16/22 1727    methocarbamol (ROBAXIN) tablet 500 mg, 500 mg, Oral, Q6H [facility], Anne Crews PA-C, 500 mg at 08/17/22 0523    metoclopramide (REGLAN) injection 5 mg, 5 mg, Intravenous, Q6H PRN, Perla Tidwell PA-C    nystatin (MYCOSTATIN) powder, , Topical, BID, Mauricio Pickering MD, Given at 08/17/22 0933    oxyCODONE (ROXICODONE) immediate release tablet 10 mg, 10 mg, Oral, Q4H PRN, Cherie Quinonez PA-C, 10 mg at 08/17/22 0933    oxyCODONE (ROXICODONE) IR tablet 5 mg, 5 mg, Oral, Q4H PRN, Cherie Quinonez PA-C    pantoprazole (PROTONIX) EC tablet 40 mg, 40 mg, Oral, Early Morning, Mauricio Pickering MD, 40 mg at 08/17/22 0523    polyethylene glycol (MIRALAX) packet 17 g, 17 g, Oral, Daily, Adam Hernandez MD, 17 g at 08/17/22 0933    pregabalin (LYRICA) capsule 50 mg, 50 mg, Oral, Daily, Adam Hernandez MD, 50 mg at 08/17/22 0932    senna (SENOKOT) tablet 17 2 mg, 2 tablet, Oral, HS PRN, Adam Hernandez MD    senna [facility]) tablet 8 6 mg, 1 tablet, Oral, Daily, Adam Hernandez MD, 8 6 mg at 08/17/22 0932    sertraline (ZOLOFT) tablet 75 mg, 75 mg, Oral, Daily, Adam Hernandez MD, 75 mg at 08/17/22 0932    sevelamer (RENAGEL) tablet 1,600 mg, 1,600 mg, Oral, TID With Meals, Adam Hernandez MD, 1,600 mg at 08/17/22 0935    simethicone (MYLICON) chewable tablet 80 mg, 80 mg, Oral, Q6H PRN, Ferdinand Cardona DO    warfarin (COUMADIN) tablet 10 mg, 10 mg, Oral, Daily (warfarin), Perla Tidwell PA-C, 10 mg at 08/16/22 1726    Invasive Devices:        Lab Results:   Results from last 7 days   Lab Units 08/16/22  0447 08/13/22  0959 08/12/22  1515   WBC Thousand/uL 7 84 7 28 8 21   HEMOGLOBIN g/dL 8 0* 8 6* 8 1*   HEMATOCRIT % 25 9* 27 6* 25 6*   PLATELETS Thousands/uL 197 208 206   POTASSIUM mmol/L 4 7 4 4 5 3   CHLORIDE mmol/L 100 97 99   CO2 mmol/L 29 30 29   BUN mg/dL 55* 40* 33*   CREATININE mg/dL 10 60* 8 49* 7 06*   CALCIUM mg/dL 8 7 9 1 6 8*       Previous work up:      Portions of the record may have been created with voice recognition software  Occasional wrong word or "sound a like" substitutions may have occurred due to the inherent limitations of voice recognition software  Read the chart carefully and recognize, using context, where substitutions have occurred  If you have any questions, please contact the dictating provider

## 2022-08-17 NOTE — ASSESSMENT & PLAN NOTE
Lab Results   Component Value Date    HGBA1C 9 4 (H) 07/09/2022       Recent Labs     08/16/22  1621 08/16/22  2128 08/17/22  0603 08/17/22  1106   POCGLU 141* 103 82 79       Blood Sugar Average: Last 72 hrs:  (P) 100 5     · Continue with Lantus and sliding scale at a lower dose  · Monitor blood sugar  · Patient with poor p o  Intake, SO reports that when she eats she gets an upset stomach/vomits frequently    Recommend that she have GI evaluation for gastric emptying study as an outpt to evaluate for gastroparesis -- she was recommended this by GI at EGD 11/2021

## 2022-08-17 NOTE — ASSESSMENT & PLAN NOTE
Pt and SO report persistent nausea and vomiting for "a while "  Pt was recommended to have gastric emptying study after EGD in November of 2021, this was not done   · Recommend she be evaluated for suspected gastroparesis, recommend GI referral on d/c   · PRN antiemetic  · KUB negative    Pt had BM 8/14 with improvement of abdominal pain and nausea

## 2022-08-17 NOTE — ASSESSMENT & PLAN NOTE
Patient came to the hospital after 2 falls at home with right-sided hip/back pain that radiates down her R leg  She was recently in hospital with complaint of similar pain   · No acute osseous abnormality on CT scan  · Pain control -- pt reports her pain feels mildly better today  Will make following adjustments 8/16:  · Change tylenol to scheduled   · DC percocet, change to oxy IR  · DC IV Dilaudid--no indication for IV narcotics  · Continue with lyrica, lidoderm patch  · Order Aqua K  · Add robaxin 500 mg q6h  · PT/OT evaluation -- recommending rehab at this time, patient agreeable   CM following

## 2022-08-17 NOTE — PROGRESS NOTES
[address] [facility]  Progress Note Deangelo [facility] 1965, 56 y o  female MRN: [id number]  Unit/Bed#: -01 Encounter: [id number]  Primary Care Provider: Michael Zamora MD   Date and time admitted to hospital: 8/12/2022 11:43 AM    * [address]  Patient came to the hospital after 2 falls at home with right-sided hip/back pain that radiates down her R leg  She was recently in hospital with complaint of similar pain   · No acute osseous abnormality on CT scan  · Pain control -- pt reports her pain feels mildly better today  Will make following adjustments 8/16:  · Change tylenol to scheduled   · DC percocet, change to oxy IR  · DC IV Dilaudid--no indication for IV narcotics  · Continue with lyrica, lidoderm patch  · Order Aqua K  · Add robaxin 500 mg q6h  · PT/OT evaluation -- recommending rehab at this time, patient agreeable  CM following    Concern for Osteomyelitis/Discitis of lumbar region  Assessment & Plan  Patient came to the hospital recently with back pain and was evaluated by Neurosurgery and Infectious Disease  · No plan for any biopsy or surgical intervention    Chronic anticoagulation  Assessment & Plan  Patient is on Coumadin as outpatient  · Patient reported that she did not take Coumadin previous day of admission   · On 9 mg of Coumadin    INR was subtherapeutic -- repeat now WNL    Chronic pain syndrome  Assessment & Plan  Followed by pain management as outpatient  · Appears she was briefly on short course of Percocet but does not appear to have chornic narcotics  · Seen by APS on recent admission  · Pain regimen as above    Pulmonary embolus [facility])  Assessment & Plan  Patient with previous history of PE  · Questionable compliance  · Continue coumadin   · INR therapeutic    Acquired hypothyroidism  Assessment & Plan  · Continue Synthroid    Type 2 diabetes mellitus [facility])  Assessment & Plan  Lab Results   Component Value Date    HGBA1C 9 4 (H) 07/09/2022 Recent Labs     08/16/22  1621 08/16/22  2128 08/17/22  0603 08/17/22  1106   POCGLU 141* 103 82 79       Blood Sugar Average: Last 72 hrs:  (P) 100 5     · Continue with Lantus and sliding scale at a lower dose  · Monitor blood sugar  · Patient with poor p o  Intake, SO reports that when she eats she gets an upset stomach/vomits frequently  Recommend that she have GI evaluation for gastric emptying study as an outpt to evaluate for gastroparesis -- she was recommended this by GI at EGD 11/2021     Hyperlipidemia  Assessment & Plan  · Continue with statin    Morbid obesity  Assessment & Plan  · TLC as outpatient    Anemia of chronic disease  Assessment & Plan  Hemoglobin at baseline  · Monitor    Secondary hyperparathyroidism of renal origin [facility])  Assessment & Plan  · Continue Sensipar    Nausea and vomiting  Assessment & Plan  Pt and SO report persistent nausea and vomiting for "a while "  Pt was recommended to have gastric emptying study after EGD in November of 2021, this was not done   · Recommend she be evaluated for suspected gastroparesis, recommend GI referral on d/c   · PRN antiemetic  · KUB negative  Pt had BM 8/14 with improvement of abdominal pain and nausea     Anxiety disorder  Assessment & Plan  · Continue with p r n  Xanax    ESRD on hemodialysis  Assessment & Plan  Lab Results   Component Value Date    EGFR 3 08/16/2022    EGFR 4 08/13/2022    EGFR 5 08/12/2022    CREATININE 10 60 (H) 08/16/2022    CREATININE 8 49 (H) 08/13/2022    CREATININE 7 06 (H) 08/12/2022     · Patient gets dialysis on Tuesday Thursday Saturday      · Continue with Nephrocaps and sensipar and Renagel  · Nephrology consulted     Essential hypertension  Assessment & Plan  BP low normal  · Renal following, input appreciated  · Currently on Coreg 25 mg BID, holding home dose Nifedipine and Demadex        VTE Pharmacologic Prophylaxis: VTE Score: 8 High Risk (Score >/= 5) - Pharmacological DVT Prophylaxis Ordered: warfarin (Coumadin)  Sequential Compression Devices Ordered  Patient Centered Rounds: I performed bedside rounds with nursing staff today  Discussions with Specialists or Other Care Team Provider: RN    Education and Discussions with Family / Patient: Updated  (significant other) via phone  Time Spent for Care: 20 minutes  More than 50% of total time spent on counseling and coordination of care as described above  Current Length of Stay: 4 day(s)  Current Patient Status: Inpatient   Certification Statement: The patient will continue to require additional inpatient hospital stay due to pending placement  Discharge Plan: Anticipate discharge in 24-48 hrs to rehab facility  Code Status: Level 1 - Full Code    Subjective:   Pt reports she is feeling constipated again  Objective:     Vitals:   Temp (24hrs), Av 2 °F (36 8 °C), Min:98 °F (36 7 °C), Max:98 3 °F (36 8 °C)    Temp:  [98 °F (36 7 °C)-98 3 °F (36 8 °C)] 98 °F (36 7 °C)  BP: (124-152)/(50-61) 152/61  Body mass index is 45 19 kg/m²  Input and Output Summary (last 24 hours):   No intake or output data in the 24 hours ending 22 1458    Physical Exam:   Physical Exam  Vitals reviewed  Constitutional:       General: She is not in acute distress  Appearance: She is not toxic-appearing  HENT:      Head: Normocephalic and atraumatic  Eyes:      Extraocular Movements: Extraocular movements intact  Cardiovascular:      Rate and Rhythm: Normal rate and regular rhythm  Pulmonary:      Effort: Pulmonary effort is normal  No respiratory distress  Abdominal:      General: Bowel sounds are normal  There is no distension  Palpations: Abdomen is soft  Tenderness: There is no abdominal tenderness  Musculoskeletal:         General: Normal range of motion  Cervical back: Normal range of motion  Neurological:      General: No focal deficit present        Mental Status: She is alert and oriented to person, place, and time  Psychiatric:         Mood and Affect: Mood normal          Behavior: Behavior normal          Thought Content: Thought content normal           Additional Data:     Labs:  Results from last 7 days   Lab Units 08/16/22  0447   WBC Thousand/uL 7 84   HEMOGLOBIN g/dL 8 0*   HEMATOCRIT % 25 9*   PLATELETS Thousands/uL 197   NEUTROS PCT % 65   LYMPHS PCT % 18   MONOS PCT % 12   EOS PCT % 3     Results from last 7 days   Lab Units 08/16/22  0447   SODIUM mmol/L 136   POTASSIUM mmol/L 4 7   CHLORIDE mmol/L 100   CO2 mmol/L 29   BUN mg/dL 55*   CREATININE mg/dL 10 60*   ANION GAP mmol/L 7   CALCIUM mg/dL 8 7   GLUCOSE RANDOM mg/dL 70     Results from last 7 days   Lab Units 08/17/22  0446   INR  2 36*     Results from last 7 days   Lab Units 08/17/22  1106 08/17/22  0603 08/16/22  2128 08/16/22  1621 08/16/22  0614 08/15/22  2106 08/15/22  1613 08/15/22  1104 08/15/22  0738 08/15/22  0557 08/14/22  2040 08/14/22  1551   POC GLUCOSE mg/dl 79 82 103 141* 75 158* 125 105 89 67 111 93               Lines/Drains:  Invasive Devices  Report    Line  Duration           Hemodialysis AV Fistula 02/20/18 Left Forearm 1638 days                      Imaging: No pertinent imaging reviewed      Recent Cultures (last 7 days):         Last 24 Hours Medication List:   Current Facility-Administered Medications   Medication Dose Route Frequency Provider Last Rate    acetaminophen  975 mg Oral Q8H [facility] Anne Crews PA-C      albuterol  2 puff Inhalation Q6H PRN Mauricio Pickering MD      ALPRAZolam  0 25 mg Oral BID PRN Mauricio Pickering MD      atorvastatin  20 mg Oral Daily With Arturo Hobbs MD      b complex-vitamin C-folic acid  1 capsule Oral Daily With Arturo Hobbs MD      carvedilol  25 mg Oral BID Amanda Hunt MD      dicyclomine  10 mg Oral TID PRN Mauricio Pickering MD      insulin glargine  8 Units Subcutaneous HS Mauricio Pickering MD      insulin lispro  1-5 Units Subcutaneous TID [facility] Mauricio Pickering MD  insulin lispro  1-5 Units Subcutaneous HS Misael Jay MD      insulin lispro  3 Units Subcutaneous TID With Meals Misael Jay MD      levothyroxine  50 mcg Oral Early Morning Misael Jay MD      lidocaine  1 patch Topical Daily Misael Jay MD      loratadine  10 mg Oral Daily Misael Jay MD      melatonin  3 mg Oral HS Misael Jay MD      menthol-methyl salicylate   Apply externally 4x Daily PRN Deonte Rosales, LILLIANA      methocarbamol  500 mg Oral Q6H [facility] Anneranjeet SavageLILLIANA altamirano      metoclopramide  5 mg Intravenous Q6H PRN Dottie Mahoney PA-C      nystatin   Topical BID Misael Jay MD      oxyCODONE  10 mg Oral Q4H PRN Sung Shark, LILLIANA      oxyCODONE  5 mg Oral Q4H PRN Sung Shark, LILLIANA      pantoprazole  40 mg Oral Early Morning Misael Jay MD      polyethylene glycol  17 g Oral Daily Misael Jay MD      pregabalin  50 mg Oral Daily Misael Jay MD      senna  2 tablet Oral HS PRN Misael Jay MD      senna  1 tablet Oral Daily Misael Jay MD      sertraline  75 mg Oral Daily Misael Jay MD      sevelamer  1,600 mg Oral TID With Meals Misael Jay MD      simethicone  80 mg Oral Q6H PRN Ferdinand Cardona DO      warfarin  10 mg Oral Daily (warfarin) Dottie Mahoney PA-C          Today, Patient Was Seen By: Dottie Mahoney PA-C    **Please Note: This note may have been constructed using a voice recognition system  **

## 2022-08-17 NOTE — PLAN OF CARE
Problem: MOBILITY - ADULT  Goal: Maintain or return to baseline ADL function  Description: INTERVENTIONS:  -  Assess patient's ability to carry out ADLs; assess patient's baseline for ADL function and identify physical deficits which impact ability to perform ADLs (bathing, care of mouth/teeth, toileting, grooming, dressing, etc )  - Assess/evaluate cause of self-care deficits   - Assess range of motion  - Assess patient's mobility; develop plan if impaired  - Assess patient's need for assistive devices and provide as appropriate  - Encourage maximum independence but intervene and supervise when necessary  - Involve family in performance of ADLs  - Assess for home care needs following discharge   - Consider OT consult to assist with ADL evaluation and planning for discharge  - Provide patient education as appropriate  Outcome: Progressing     Problem: PAIN - ADULT  Goal: Verbalizes/displays adequate comfort level or baseline comfort level  Description: Interventions:  - Encourage patient to monitor pain and request assistance  - Assess pain using appropriate pain scale  - Administer analgesics based on type and severity of pain and evaluate response  - Implement non-pharmacological measures as appropriate and evaluate response  - Consider cultural and social influences on pain and pain management  - Notify physician/advanced practitioner if interventions unsuccessful or patient reports new pain  Outcome: Progressing     Problem: SAFETY ADULT  Goal: Maintain or return to baseline ADL function  Description: INTERVENTIONS:  -  Assess patient's ability to carry out ADLs; assess patient's baseline for ADL function and identify physical deficits which impact ability to perform ADLs (bathing, care of mouth/teeth, toileting, grooming, dressing, etc )  - Assess/evaluate cause of self-care deficits   - Assess range of motion  - Assess patient's mobility; develop plan if impaired  - Assess patient's need for assistive devices and provide as appropriate  - Encourage maximum independence but intervene and supervise when necessary  - Involve family in performance of ADLs  - Assess for home care needs following discharge   - Consider OT consult to assist with ADL evaluation and planning for discharge  - Provide patient education as appropriate  Outcome: Progressing  Goal: Patient will remain free of falls  Description: INTERVENTIONS:  - Educate patient/family on patient safety including physical limitations  - Instruct patient to call for assistance with activity   - Consult OT/PT to assist with strengthening/mobility   - Keep Call bell within reach  - Keep bed low and locked with side rails adjusted as appropriate  - Keep care items and personal belongings within reach  - Initiate and maintain comfort rounds  - Make Fall Risk Sign visible to staff  - Offer Toileting every  Hours, in advance of need  - Initiate/Maintain alarm  - Obtain necessary fall risk management equipment:   - Apply yellow socks and bracelet for high fall risk patients  - Consider moving patient to room near nurses station  Outcome: Progressing     Problem: Potential for Falls  Goal: Patient will remain free of falls  Description: INTERVENTIONS:  - Educate patient/family on patient safety including physical limitations  - Instruct patient to call for assistance with activity   - Consult OT/PT to assist with strengthening/mobility   - Keep Call bell within reach  - Keep bed low and locked with side rails adjusted as appropriate  - Keep care items and personal belongings within reach  - Initiate and maintain comfort rounds  - Make Fall Risk Sign visible to staff  - Offer Toileting every  Hours, in advance of need  - Initiate/Maintain alarm  - Obtain necessary fall risk management equipment:   - Apply yellow socks and bracelet for high fall risk patients  - Consider moving patient to room near nurses station  Outcome: Progressing

## 2022-08-17 NOTE — PLAN OF CARE
Problem: MOBILITY - ADULT  Goal: Maintain or return to baseline ADL function  Description: INTERVENTIONS:  -  Assess patient's ability to carry out ADLs; assess patient's baseline for ADL function and identify physical deficits which impact ability to perform ADLs (bathing, care of mouth/teeth, toileting, grooming, dressing, etc )  - Assess/evaluate cause of self-care deficits   - Assess range of motion  - Assess patient's mobility; develop plan if impaired  - Assess patient's need for assistive devices and provide as appropriate  - Encourage maximum independence but intervene and supervise when necessary  - Involve family in performance of ADLs  - Assess for home care needs following discharge   - Consider OT consult to assist with ADL evaluation and planning for discharge  - Provide patient education as appropriate  Outcome: Progressing  Goal: Maintains/Returns to pre admission functional level  Description: INTERVENTIONS:  - Perform BMAT or MOVE assessment daily    - Set and communicate daily mobility goal to care team and patient/family/caregiver     - Collaborate with rehabilitation services on mobility goals if consulted  - Out of bed for toileting  - Record patient progress and toleration of activity level   Outcome: Progressing     Problem: PAIN - ADULT  Goal: Verbalizes/displays adequate comfort level or baseline comfort level  Description: Interventions:  - Encourage patient to monitor pain and request assistance  - Assess pain using appropriate pain scale  - Administer analgesics based on type and severity of pain and evaluate response  - Implement non-pharmacological measures as appropriate and evaluate response  - Consider cultural and social influences on pain and pain management  - Notify physician/advanced practitioner if interventions unsuccessful or patient reports new pain  Outcome: Progressing     Problem: INFECTION - ADULT  Goal: Absence or prevention of progression during hospitalization  Description: INTERVENTIONS:  - Assess and monitor for signs and symptoms of infection  - Monitor lab/diagnostic results  - Monitor all insertion sites, i e  indwelling lines, tubes, and drains  - Monitor endotracheal if appropriate and nasal secretions for changes in amount and color  - [place] appropriate cooling/warming therapies per order  - Administer medications as ordered  - Instruct and encourage patient and family to use good hand hygiene technique  - Identify and instruct in appropriate isolation precautions for identified infection/condition  Outcome: Progressing  Goal: Absence of fever/infection during neutropenic period  Description: INTERVENTIONS:  - Monitor WBC    Outcome: Progressing     Problem: SAFETY ADULT  Goal: Maintain or return to baseline ADL function  Description: INTERVENTIONS:  -  Assess patient's ability to carry out ADLs; assess patient's baseline for ADL function and identify physical deficits which impact ability to perform ADLs (bathing, care of mouth/teeth, toileting, grooming, dressing, etc )  - Assess/evaluate cause of self-care deficits   - Assess range of motion  - Assess patient's mobility; develop plan if impaired  - Assess patient's need for assistive devices and provide as appropriate  - Encourage maximum independence but intervene and supervise when necessary  - Involve family in performance of ADLs  - Assess for home care needs following discharge   - Consider OT consult to assist with ADL evaluation and planning for discharge  - Provide patient education as appropriate  Outcome: Progressing  Goal: Maintains/Returns to pre admission functional level  Description: INTERVENTIONS:  - Perform BMAT or MOVE assessment daily    - Set and communicate daily mobility goal to care team and patient/family/caregiver     - Collaborate with rehabilitation services on mobility goals if consulted  - Record patient progress and toleration of activity level   Outcome: Progressing  Goal: Patient will remain free of falls  Description: INTERVENTIONS:  - Educate patient/family on patient safety including physical limitations  - Instruct patient to call for assistance with activity   - Consult OT/PT to assist with strengthening/mobility   - Keep Call bell within reach  - Keep bed low and locked with side rails adjusted as appropriate  - Keep care items and personal belongings within reach  - Initiate and maintain comfort rounds  - Make Fall Risk Sign visible to staff  - Apply yellow socks and bracelet for high fall risk patients  - Consider moving patient to room near nurses station  Outcome: Progressing     Problem: DISCHARGE PLANNING  Goal: Discharge to home or other facility with appropriate resources  Description: INTERVENTIONS:  - Identify barriers to discharge w/patient and caregiver  - Arrange for needed discharge resources and transportation as appropriate  - Identify discharge learning needs (meds, wound care, etc )  - Arrange for interpretive services to assist at discharge as needed  - Refer to Case Management Department for coordinating discharge planning if the patient needs post-hospital services based on physician/advanced practitioner order or complex needs related to functional status, cognitive ability, or social support system  Outcome: Progressing     Problem: Knowledge Deficit  Goal: Patient/family/caregiver demonstrates understanding of disease process, treatment plan, medications, and discharge instructions  Description: Complete learning assessment and assess knowledge base    Interventions:  - Provide teaching at level of understanding  - Provide teaching via preferred learning methods  Outcome: Progressing     Problem: Potential for Falls  Goal: Patient will remain free of falls  Description: INTERVENTIONS:  - Educate patient/family on patient safety including physical limitations  - Instruct patient to call for assistance with activity   - Consult OT/PT to assist with strengthening/mobility   - Keep Call bell within reach  - Keep bed low and locked with side rails adjusted as appropriate  - Keep care items and personal belongings within reach  - Initiate and maintain comfort rounds  - Make Fall Risk Sign visible to staff  - Apply yellow socks and bracelet for high fall risk patients  - Consider moving patient to room near nurses station  Outcome: Progressing     Problem: Prexisting or High Potential for Compromised Skin Integrity  Goal: Skin integrity is maintained or improved  Description: INTERVENTIONS:  - Identify patients at risk for skin breakdown  - Assess and monitor skin integrity  - Assess and monitor nutrition and hydration status  - Monitor labs   - Assess for incontinence   - Turn and reposition patient  - Assist with mobility/ambulation  - Relieve pressure over bony prominences  - Avoid friction and shearing  - Provide appropriate hygiene as needed including keeping skin clean and dry  - Evaluate need for skin moisturizer/barrier cream  - Collaborate with interdisciplinary team   - Patient/family teaching  - Consider wound care consult   Outcome: Progressing     Problem: METABOLIC, FLUID AND ELECTROLYTES - ADULT  Goal: Electrolytes maintained within normal limits  Description: INTERVENTIONS:  - Monitor labs and assess patient for signs and symptoms of electrolyte imbalances  - Administer electrolyte replacement as ordered  - Monitor response to electrolyte replacements, including repeat lab results as appropriate  - Instruct patient on fluid and nutrition as appropriate  Outcome: Progressing  Goal: Fluid balance maintained  Description: INTERVENTIONS:  - Monitor labs   - Monitor I/O and WT  - Instruct patient on fluid and nutrition as appropriate  - Assess for signs & symptoms of volume excess or deficit  Outcome: Progressing  Goal: Glucose maintained within target range  Description: INTERVENTIONS:  - Monitor Blood Glucose as ordered  - Assess for signs and symptoms of hyperglycemia and hypoglycemia  - Administer ordered medications to maintain glucose within target range  - Assess nutritional intake and initiate nutrition service referral as needed  Outcome: Progressing

## 2022-08-18 ENCOUNTER — APPOINTMENT (INPATIENT)
Dept: DIALYSIS | Facility: HOSPITAL | Age: 57
DRG: 539 | End: 2022-08-18
Attending: INTERNAL MEDICINE
Payer: MEDICARE

## 2022-08-18 LAB
ANION GAP SERPL CALCULATED.3IONS-SCNC: 9 MMOL/L (ref 4–13)
BUN SERPL-MCNC: 31 MG/DL (ref 5–25)
CALCIUM SERPL-MCNC: 8.9 MG/DL (ref 8.3–10.1)
CHLORIDE SERPL-SCNC: 97 MMOL/L (ref 96–108)
CO2 SERPL-SCNC: 27 MMOL/L (ref 21–32)
CREAT SERPL-MCNC: 8.27 MG/DL (ref 0.6–1.3)
GFR SERPL CREATININE-BSD FRML MDRD: 4 ML/MIN/1.73SQ M
GLUCOSE SERPL-MCNC: 101 MG/DL (ref 65–140)
GLUCOSE SERPL-MCNC: 165 MG/DL (ref 65–140)
GLUCOSE SERPL-MCNC: 180 MG/DL (ref 65–140)
GLUCOSE SERPL-MCNC: 83 MG/DL (ref 65–140)
GLUCOSE SERPL-MCNC: 84 MG/DL (ref 65–140)
INR PPP: 2.76 (ref 0.84–1.19)
POTASSIUM SERPL-SCNC: 4.5 MMOL/L (ref 3.5–5.3)
PROTHROMBIN TIME: 29.4 SECONDS (ref 11.6–14.5)
SODIUM SERPL-SCNC: 133 MMOL/L (ref 135–147)

## 2022-08-18 PROCEDURE — 99232 SBSQ HOSP IP/OBS MODERATE 35: CPT | Performed by: PHYSICIAN ASSISTANT

## 2022-08-18 PROCEDURE — 80048 BASIC METABOLIC PNL TOTAL CA: CPT | Performed by: INTERNAL MEDICINE

## 2022-08-18 PROCEDURE — 85610 PROTHROMBIN TIME: CPT | Performed by: FAMILY MEDICINE

## 2022-08-18 PROCEDURE — 90935 HEMODIALYSIS ONE EVALUATION: CPT | Performed by: INTERNAL MEDICINE

## 2022-08-18 PROCEDURE — 82948 REAGENT STRIP/BLOOD GLUCOSE: CPT

## 2022-08-18 RX ADMIN — INSULIN GLARGINE 8 UNITS: 100 INJECTION, SOLUTION SUBCUTANEOUS at 23:07

## 2022-08-18 RX ADMIN — METHOCARBAMOL 500 MG: 500 TABLET ORAL at 18:35

## 2022-08-18 RX ADMIN — LEVOTHYROXINE SODIUM 50 MCG: 75 TABLET ORAL at 06:08

## 2022-08-18 RX ADMIN — OXYCODONE HYDROCHLORIDE 10 MG: 10 TABLET ORAL at 18:36

## 2022-08-18 RX ADMIN — PANTOPRAZOLE SODIUM 40 MG: 40 TABLET, DELAYED RELEASE ORAL at 06:08

## 2022-08-18 RX ADMIN — SEVELAMER HYDROCHLORIDE 1600 MG: 800 TABLET ORAL at 18:39

## 2022-08-18 RX ADMIN — METOCLOPRAMIDE HYDROCHLORIDE 5 MG: 5 INJECTION INTRAMUSCULAR; INTRAVENOUS at 09:07

## 2022-08-18 RX ADMIN — ACETAMINOPHEN 975 MG: 325 TABLET ORAL at 23:07

## 2022-08-18 RX ADMIN — INSULIN LISPRO 1 UNITS: 100 INJECTION, SOLUTION INTRAVENOUS; SUBCUTANEOUS at 18:43

## 2022-08-18 RX ADMIN — ACETAMINOPHEN 975 MG: 325 TABLET ORAL at 06:08

## 2022-08-18 RX ADMIN — NYSTATIN: 100000 POWDER TOPICAL at 18:36

## 2022-08-18 RX ADMIN — OXYCODONE HYDROCHLORIDE 10 MG: 10 TABLET ORAL at 09:07

## 2022-08-18 RX ADMIN — METHOCARBAMOL 500 MG: 500 TABLET ORAL at 23:07

## 2022-08-18 RX ADMIN — METHOCARBAMOL 500 MG: 500 TABLET ORAL at 06:07

## 2022-08-18 RX ADMIN — NEPHROCAP 1 CAPSULE: 1 CAP ORAL at 18:39

## 2022-08-18 RX ADMIN — MELATONIN 3 MG: at 23:07

## 2022-08-18 RX ADMIN — CARVEDILOL 25 MG: 25 TABLET, FILM COATED ORAL at 18:36

## 2022-08-18 RX ADMIN — ATORVASTATIN CALCIUM 20 MG: 20 TABLET, FILM COATED ORAL at 18:40

## 2022-08-18 RX ADMIN — INSULIN LISPRO 3 UNITS: 100 INJECTION, SOLUTION INTRAVENOUS; SUBCUTANEOUS at 18:43

## 2022-08-18 RX ADMIN — WARFARIN SODIUM 10 MG: 5 TABLET ORAL at 18:35

## 2022-08-18 NOTE — PLAN OF CARE
Goal- remove 3-4 L as tolerated using 2 K+ bath over 3 1/2 hr hd tx      Problem: METABOLIC, FLUID AND ELECTROLYTES - ADULT  Goal: Electrolytes maintained within normal limits  Description: INTERVENTIONS:  - Monitor labs and assess patient for signs and symptoms of electrolyte imbalances  - Administer electrolyte replacement as ordered  - Monitor response to electrolyte replacements, including repeat lab results as appropriate  - Instruct patient on fluid and nutrition as appropriate  Outcome: Progressing

## 2022-08-18 NOTE — HEMODIALYSIS
Post-Dialysis RN Treatment Note    Blood Pressure:  Pre 148/87 mm/Hg  Post 128/60 mmHg   EDW  120 5 kg    Weight:  Pre 125 5 kg   Post 122 4 kg   Mode of weight measurement: Standing Scale   Volume Removed  2737 ml    Treatment duration 195 minutes    NS given  No    Treatment shortened? Yes, describe: 15 minutes at patient request   Dr Niles Joshi notified and aware     Medications given during Rx None Reported   Estimated Kt/V  None Reported   Access type: AV fistula   Access Issues: No    Report called to primary nurse   Yes Lennis Merlin, RN

## 2022-08-18 NOTE — PROGRESS NOTES
[facility]  Progress Note Mikel Aleman 1965, 56 y o  female MRN: [id number]  Unit/Bed#: MS [facility]4-01 Encounter: [id number]  Primary Care Provider: Susan Rojas MD   Date and time admitted to hospital: 8/12/2022 11:43 AM    * [address]  Patient came to the hospital after 2 falls at home with right-sided hip/back pain that radiates down her R leg  She was recently in hospital with complaint of similar pain   · No acute osseous abnormality on CT scan  · Pain control -- pt reports her pain feels mildly better today  · Change tylenol to scheduled   · DC percocet, change to oxy IR  · DC IV Dilaudid--no indication for IV narcotics  · Continue with lyrica, lidoderm patch  · Order Aqua K  · Add robaxin 500 mg q6h  · PT/OT evaluation -- recommending rehab at this time, patient agreeable  CM following    Concern for Osteomyelitis/Discitis of lumbar region  Assessment & Plan  Patient came to the hospital recently with back pain and was evaluated by Neurosurgery and Infectious Disease  · No plan for any biopsy or surgical intervention    Chronic anticoagulation  Assessment & Plan  Patient is on Coumadin as outpatient  · Patient reported that she did not take Coumadin previous day of admission   · On 9 mg of Coumadin    INR was subtherapeutic -- repeat now WNL    Chronic pain syndrome  Assessment & Plan  Followed by pain management as outpatient  · Appears she was briefly on short course of Percocet but does not appear to have chornic narcotics  · Seen by APS on recent admission  · Pain regimen as above    Pulmonary embolus [facility])  Assessment & Plan  Patient with previous history of PE  · Questionable compliance  · Continue coumadin   · INR therapeutic    Acquired hypothyroidism  Assessment & Plan  · Continue Synthroid    Type 2 diabetes mellitus [facility])  Assessment & Plan  Lab Results   Component Value Date    HGBA1C 9 4 (H) 07/09/2022       Recent Labs     08/17/22  1106 08/17/22  1555 08/17/22  2045 08/18/22  0639   POCGLU 79 148* 102 83       Blood Sugar Average: Last 72 hrs:  (P) 697 [id number]     · Continue with Lantus and sliding scale at a lower dose  · Monitor blood sugar  · Patient with poor p o  Intake, SO reports that when she eats she gets an upset stomach/vomits frequently  Recommend that she have GI evaluation for gastric emptying study as an outpt to evaluate for gastroparesis -- she was recommended this by GI at EGD 11/2021     Hyperlipidemia  Assessment & Plan  · Continue with statin    Morbid obesity  Assessment & Plan  · TLC as outpatient    Anemia of chronic disease  Assessment & Plan  Hemoglobin at baseline  · Monitor    Secondary hyperparathyroidism of renal origin [facility])  Assessment & Plan  · Continue Sensipar    Nausea and vomiting  Assessment & Plan  Pt and SO report persistent nausea and vomiting for "a while "  Pt was recommended to have gastric emptying study after EGD in November of 2021, this was not done   · Recommend she be evaluated for suspected gastroparesis, recommend GI referral on d/c   · PRN antiemetic  · KUB negative  Pt had BM 8/14 with improvement of abdominal pain and nausea     Anxiety disorder  Assessment & Plan  · Continue with p r n  Xanax    ESRD on hemodialysis  Assessment & Plan  Lab Results   Component Value Date    EGFR 3 08/16/2022    EGFR 4 08/13/2022    EGFR 5 08/12/2022    CREATININE 10 60 (H) 08/16/2022    CREATININE 8 49 (H) 08/13/2022    CREATININE 7 06 (H) 08/12/2022     · Patient gets dialysis on Tuesday Thursday Saturday  · Continue with Nephrocaps and sensipar and Renagel  · Nephrology consulted     Essential hypertension  Assessment & Plan  BP low normal  · Renal following, input appreciated  · Currently on Coreg 25 mg BID, holding home dose Nifedipine and Demadex        VTE Pharmacologic Prophylaxis: VTE Score: 8 High Risk (Score >/= 5) - Pharmacological DVT Prophylaxis Ordered: warfarin (Coumadin)  Sequential Compression Devices Ordered  Patient Centered Rounds: I performed bedside rounds with nursing staff today  Discussions with Specialists or Other Care Team Provider: RN    Education and Discussions with Family / Patient: Attempted to update  (significant other) via phone  Unable to contact  Time Spent for Care: 30 minutes  More than 50% of total time spent on counseling and coordination of care as described above  Current Length of Stay: 5 day(s)  Current Patient Status: Inpatient   Certification Statement: The patient will continue to require additional inpatient hospital stay due to pending rehab placement  Discharge Plan: Anticipate discharge in 24-48 hrs to rehab facility  Code Status: Level 1 - Full Code    Subjective:   Patient reports to pain on RT side that is the same as before  Had a large BM yesterday  Nauseated this AM       Objective:     Vitals:   Temp (24hrs), Av 1 °F (36 7 °C), Min:98 °F (36 7 °C), Max:98 1 °F (36 7 °C)    Temp:  [98 °F (36 7 °C)-98 1 °F (36 7 °C)] 98 °F (36 7 °C)  BP: (127-135)/(41-54) 135/46  Body mass index is 45 19 kg/m²  Input and Output Summary (last 24 hours):   No intake or output data in the 24 hours ending 22 0851    Physical Exam:   Physical Exam  Vitals reviewed  Constitutional:       General: She is not in acute distress  Appearance: She is not toxic-appearing  HENT:      Head: Normocephalic and atraumatic  Eyes:      Extraocular Movements: Extraocular movements intact  Cardiovascular:      Rate and Rhythm: Normal rate and regular rhythm  Pulmonary:      Effort: Pulmonary effort is normal  No respiratory distress  Breath sounds: Normal breath sounds  Abdominal:      General: Bowel sounds are normal  There is no distension  Palpations: Abdomen is soft  Tenderness: There is no abdominal tenderness  Musculoskeletal:         General: Normal range of motion        Cervical back: Normal range of motion  Neurological:      General: No focal deficit present  Mental Status: She is alert and oriented to person, place, and time  Psychiatric:         Mood and Affect: Mood normal          Behavior: Behavior normal          Thought Content: Thought content normal           Additional Data:     Labs:  Results from last 7 days   Lab Units 08/16/22  0447   WBC Thousand/uL 7 84   HEMOGLOBIN g/dL 8 0*   HEMATOCRIT % 25 9*   PLATELETS Thousands/uL 197   NEUTROS PCT % 65   LYMPHS PCT % 18   MONOS PCT % 12   EOS PCT % 3     Results from last 7 days   Lab Units 08/16/22  0447   SODIUM mmol/L 136   POTASSIUM mmol/L 4 7   CHLORIDE mmol/L 100   CO2 mmol/L 29   BUN mg/dL 55*   CREATININE mg/dL 10 60*   ANION GAP mmol/L 7   CALCIUM mg/dL 8 7   GLUCOSE RANDOM mg/dL 70     Results from last 7 days   Lab Units 08/17/22  0446   INR  2 36*     Results from last 7 days   Lab Units 08/18/22  0639 08/17/22  2045 08/17/22  1555 08/17/22  1106 08/17/22  0603 08/16/22  2128 08/16/22  1621 08/16/22  0614 08/15/22  2106 08/15/22  1613 08/15/22  1104 08/15/22  0738   POC GLUCOSE mg/dl 83 102 148* 79 82 103 141* 75 158* 125 105 89               Lines/Drains:  Invasive Devices  Report    Peripheral Intravenous Line  Duration           Peripheral IV 08/17/22 Right Antecubital <1 day          Line  Duration           Hemodialysis AV Fistula 02/20/18 Left Forearm 1639 days                      Imaging: No pertinent imaging reviewed      Recent Cultures (last 7 days):         Last 24 Hours Medication List:   Current Facility-Administered Medications   Medication Dose Route Frequency Provider Last Rate    acetaminophen  975 mg Oral Q8H [address] Anne Crews PA-C      albuterol  2 puff Inhalation Q6H PRN Nakul Hassan MD      ALPRAZolam  0 25 mg Oral BID PRN Nakul Hassan MD      atorvastatin  20 mg Oral Daily With Joaquin Jacome MD      b complex-vitamin C-folic acid  1 capsule Oral Daily With Joaquin Jacome MD     Mel Baysin carvedilol  25 mg Oral BID Minetta Romberg, MD      dicyclomine  10 mg Oral TID PRN Benito Baptiste MD      insulin glargine  8 Units Subcutaneous HS Benito Baptiste MD      insulin lispro  1-5 Units Subcutaneous TID [facility] Benito Baptiste MD      insulin lispro  1-5 Units Subcutaneous HS Benito Baptiste MD      insulin lispro  3 Units Subcutaneous TID With Meals Benito Baptiste MD      levothyroxine  50 mcg Oral Early Morning Benito Baptiste MD      lidocaine  1 patch Topical Daily Benito Baptiste MD      loratadine  10 mg Oral Daily Benito Baptiste MD      melatonin  3 mg Oral HS Benito Baptiste MD      menthol-methyl salicylate   Apply externally 4x Daily PRN Roger Rosales PA-C      methocarbamol  500 mg Oral Q6H [facility] Anne GiovanniLILLIANA noguera      metoclopramide  5 mg Intravenous Q6H PRN Ashley Wylie PA-C      nystatin   Topical BID Benito Baptiste MD      oxyCODONE  10 mg Oral Q4H PRN Tessa Santo PA-C      oxyCODONE  5 mg Oral Q4H PRN Tessa Santo PA-C      pantoprazole  40 mg Oral Early Morning Benito Baptiste MD      polyethylene glycol  17 g Oral Daily Benito Baptiste MD      pregabalin  50 mg Oral Daily Benito Baptiste MD      senna  2 tablet Oral HS PRN Benito Baptiste MD      senna  1 tablet Oral Daily Benito Baptiste MD      sertraline  75 mg Oral Daily Benito Baptiste MD      sevelamer  1,600 mg Oral TID With Meals Benito Baptiste MD      simethicone  80 mg Oral Q6H PRN Ferdinand Cardona DO      warfarin  10 mg Oral Daily (warfarin) Ashley Wylie PA-C          Today, Patient Was Seen By: Ashley Wylie PA-C    **Please Note: This note may have been constructed using a voice recognition system  **

## 2022-08-18 NOTE — ASSESSMENT & PLAN NOTE
Patient came to the hospital after 2 falls at home with right-sided hip/back pain that radiates down her R leg  She was recently in hospital with complaint of similar pain   · No acute osseous abnormality on CT scan  · Pain control -- pt reports her pain feels mildly better today  · Change tylenol to scheduled   · DC percocet, change to oxy IR  · DC IV Dilaudid--no indication for IV narcotics  · Continue with lyrica, lidoderm patch  · Order Aqua K  · Add robaxin 500 mg q6h  · PT/OT evaluation -- recommending rehab at this time, patient agreeable   CM following

## 2022-08-18 NOTE — ASSESSMENT & PLAN NOTE
Lab Results   Component Value Date    HGBA1C 9 4 (H) 07/09/2022       Recent Labs     08/17/22  1106 08/17/22  1555 08/17/22 2045 08/18/22  0639   POCGLU 79 148* 102 83       Blood Sugar Average: Last 72 hrs:  (P) 528 [id number]     · Continue with Lantus and sliding scale at a lower dose  · Monitor blood sugar  · Patient with poor p o  Intake, SO reports that when she eats she gets an upset stomach/vomits frequently    Recommend that she have GI evaluation for gastric emptying study as an outpt to evaluate for gastroparesis -- she was recommended this by GI at EGD 11/2021

## 2022-08-18 NOTE — PROGRESS NOTES
NEPHROLOGY PROGRESS NOTE   Baylee Loja 56 y o  female MRN: [id number]  Unit/Bed#: -01 Encounter: [id number]    ASSESSMENT & PLAN:  82-year-old female known ESRD on HD TTS presented with status post fall and back pain  Recently discharged on 08/04 and had back pain at that time and there was concern for diskitis blood cultures were negative, was discharged on doxycycline given suspected cellulitis of left wrist   End-stage renal disease on dialysis  -Outpatient unit:  [address]  -Schedule:  TTS  -dry weight 123 kg, decreased to 120 5 kg on 08/16  -Access:  Left AV fistula  -Plan:  Last Post dialysis weight was down to 118 4 kg but predialysis weight today was elevated to 125  Patient was seen and examined on hemodialysis treatment, clinically appears euvolemic  Planning for ultrafiltration 3 5 kg net if tolerated, stressed on fluid restriction  Using 2 K bath, dialysis prescription reviewed  -  Na+ 138 mEq/L   Na+ Modeling No   K+ 2 0   Bicarb 35 mEq/L   Dialyzer F180   BFR-As tolerated to a maximum of: 400ml/min   DFR Other   Other Comments 500 mL/min   Duration of Treatment 3 5 Hours   Dialysate Temperature (C) 36   Dry Weight: 120 5 kg   Fluid Balance Goal  (SELECT AND ENTER AMOUNT): net negative       Volume status  -clinically euvolemic  Continue to monitor    CKD/MBD  -hypocalcemia:  Sensipar held since admission and calcium level improving  Last calcium level was 8 9 mg/dL  If Calcium level remains stable may be able to restart Sensipar at lower dose  Will follow-up outpatient labs and decide  hold Sensipar at the time of discharge  -hyperphosphatemia:  Continue Renagel  Previous phosphorus level was 6 3 on 08/04    Blood pressure  -primary hypertension:  Blood pressure stable and at goal   Avoid hypotension    -was taken off nifedipine 30 mg daily on 08/15   Currently on Coreg 25 mg twice daily with hold parameters continue to monitor blood pressure     If blood pressure trends up may need to restart back on nifedipine    Electrolytes:   -hyponatremia:  Sodium 133, placed on fluid restriction 1 8 L per day    Anemia due to ESRD  -Goal hemoglobin:  10-11 grams/deciliter  -Current hemoglobin:  Below goal at 8 0 g/dl  -Plan:  Continue to monitor  Not on MITCHELL due to history of PE  Status post fall with right-sided hip pain:  Imaging study without any bony abnormalities:  Management per primary team   Overall hip pain improving    Acute encephalopathy, suspected due to component of polypharmacy  - mental status has improved  May consider stopping Lyrica if mental status worsens    Chronic back pain:  Recent MRI showing L4-L5 osteomyelitis/diskitis  Blood culture during recent hospital admission was negative  Was seen by ID and Neurosurgery and was recommended no plan for antibiotic as there was no concern for active infection    Diabetes mellitus type 2:  Management per primary team    History of PE, currently on Coumadin    Left wrist cellulitis, completed doxycycline course on 8/14    Complain of constipation, patient is on MiraLax daily    Discussed with primary team, awaiting rehab placement        SUBJECTIVE:  Right hip pain improving  No new complaints    OBJECTIVE:  Current Weight: Weight - Scale: 127 kg (280 lb)  Vitals:    08/18/22 0759   BP: (!) 135/46   Pulse:    Resp:    Temp:    SpO2:      No intake or output data in the 24 hours ending 08/18/22 0949    Physical Exam  General:  Ill looking, awake  Eyes: Conjunctivae pink,  Sclera anicteric  ENT: lips and mucous membranes moist  Neck: supple   Chest: Clear to Auscultation both lungs,  no crackles, ronchus or wheezing  CVS: S1 & S2 present, normal rate, regular rhythm, no murmur    Abdomen: soft, non-tender, non-distended, Bowel sounds normoactive  Extremities: no edema of  legs  Skin: no rash  Neuro: awake, alert, oriented x 3   Psych: Mood and affect appropriate     Medications:    Current Facility-Administered Medications:    acetaminophen (TYLENOL) tablet 975 mg, 975 mg, Oral, Q8H [address], Anne Crews PA-C, 975 mg at 08/18/22 0608    albuterol (PROVENTIL HFA,VENTOLIN HFA) inhaler 2 puff, 2 puff, Inhalation, Q6H PRN, Benito Baptiste MD    ALPRAZolam [place]) tablet 0 25 mg, 0 25 mg, Oral, BID PRN, Benito Baptiste MD    atorvastatin (LIPITOR) tablet 20 mg, 20 mg, Oral, Daily With Misael Hernandez MD, 20 mg at 08/17/22 1748    b complex-vitamin C-folic acid (NEPHROCAPS) capsule 1 capsule, 1 capsule, Oral, Daily With Misael Hernandez MD, 1 capsule at 08/17/22 1748    carvedilol (COREG) tablet 25 mg, 25 mg, Oral, BID, Minetta Romberg, MD, 25 mg at 08/17/22 1748    dicyclomine (BENTYL) capsule 10 mg, 10 mg, Oral, TID PRN, Benito Baptiste MD, 10 mg at 08/17/22 1753    insulin glargine (LANTUS) subcutaneous injection 8 Units 0 08 mL, 8 Units, Subcutaneous, HS, Benito Baptiste MD, 8 Units at 08/17/22 2134    insulin lispro (HumaLOG) 100 units/mL subcutaneous injection 1-5 Units, 1-5 Units, Subcutaneous, TID AC **AND** Fingerstick Glucose (POCT), , , TID AC, Benito Baptiste MD    insulin lispro (HumaLOG) 100 units/mL subcutaneous injection 1-5 Units, 1-5 Units, Subcutaneous, HS, Benito Baptiste MD, 1 Units at 08/15/22 2129    insulin lispro (HumaLOG) 100 units/mL subcutaneous injection 3 Units, 3 Units, Subcutaneous, TID With Meals, Benito Baptiste MD, 3 Units at 08/16/22 1727    levothyroxine tablet 50 mcg, 50 mcg, Oral, Early Morning, Benito Baptiste MD, 50 mcg at 08/18/22 0608    lidocaine (LIDODERM) 5 % patch 1 patch, 1 patch, Topical, Daily, Benito Baptiste MD, 1 patch at 08/17/22 0932    loratadine (CLARITIN) tablet 10 mg, 10 mg, Oral, Daily, Benito Baptiste MD, 10 mg at 08/17/22 0932    melatonin tablet 3 mg, 3 mg, Oral, HS, Benito Baptiste MD, 3 mg at 08/17/22 1474    menthol-methyl salicylate (BENGAY) 32-54 % cream, , Apply externally, 4x Daily PRN, Roger Rosales PA-C, Given at 08/16/22 1727    methocarbamol (ROBAXIN) tablet 500 mg, 500 mg, Oral, Q6H [facility], Anne Crews PA-C, 500 mg at 08/18/22 0607    metoclopramide (REGLAN) injection 5 mg, 5 mg, Intravenous, Q6H PRN, Perla Tidwell PA-C, 5 mg at 08/18/22 0907    nystatin (MYCOSTATIN) powder, , Topical, BID, Jose Bee MD, Given at 08/17/22 1748    oxyCODONE (ROXICODONE) immediate release tablet 10 mg, 10 mg, Oral, Q4H PRN, Prabhakar Henao PA-C, 10 mg at 08/18/22 0907    oxyCODONE (ROXICODONE) IR tablet 5 mg, 5 mg, Oral, Q4H PRN, Prabhakar Henao PA-C    pantoprazole (PROTONIX) EC tablet 40 mg, 40 mg, Oral, Early Morning, Jose Bee MD, 40 mg at 08/18/22 0608    polyethylene glycol (MIRALAX) packet 17 g, 17 g, Oral, Daily, Jose Bee MD, 17 g at 08/17/22 0933    pregabalin (LYRICA) capsule 50 mg, 50 mg, Oral, Daily, Jose Bee MD, 50 mg at 08/17/22 0932    senna (SENOKOT) tablet 17 2 mg, 2 tablet, Oral, HS PRN, Jose Bee MD    [facility]) tablet 8 6 mg, 1 tablet, Oral, Daily, Jose Bee MD, 8 6 mg at 08/17/22 0932    sertraline (ZOLOFT) tablet 75 mg, 75 mg, Oral, Daily, Jose Bee MD, 75 mg at 08/17/22 0932    sevelamer (RENAGEL) tablet 1,600 mg, 1,600 mg, Oral, TID With Meals, Jose Bee MD, 1,600 mg at 08/17/22 1748    simethicone (MYLICON) chewable tablet 80 mg, 80 mg, Oral, Q6H PRN, Ferdinand Cardona DO, 80 mg at 08/17/22 1155    warfarin (COUMADIN) tablet 10 mg, 10 mg, Oral, Daily (warfarin), Perla Tidwell PA-C, 10 mg at 08/17/22 1748    Invasive Devices:        Lab Results:   Results from last 7 days   Lab Units 08/16/22  0447 08/13/22  0959 08/12/22  1515   WBC Thousand/uL 7 84 7 28 8 21   HEMOGLOBIN g/dL 8 0* 8 6* 8 1*   HEMATOCRIT % 25 9* 27 6* 25 6*   PLATELETS Thousands/uL 197 208 206   POTASSIUM mmol/L 4 7 4 4 5 3   CHLORIDE mmol/L 100 97 99   CO2 mmol/L 29 30 29   BUN mg/dL 55* 40* 33*   CREATININE mg/dL 10 60* 8 49* 7 06*   CALCIUM mg/dL 8 7 9 1 6 8*       Previous work up:      Portions of the record may have been created with voice recognition software  Occasional wrong word or "sound a like" substitutions may have occurred due to the inherent limitations of voice recognition software  Read the chart carefully and recognize, using context, where substitutions have occurred  If you have any questions, please contact the dictating provider

## 2022-08-19 LAB
GLUCOSE SERPL-MCNC: 118 MG/DL (ref 65–140)
GLUCOSE SERPL-MCNC: 126 MG/DL (ref 65–140)
GLUCOSE SERPL-MCNC: 127 MG/DL (ref 65–140)
GLUCOSE SERPL-MCNC: 194 MG/DL (ref 65–140)
GLUCOSE SERPL-MCNC: 57 MG/DL (ref 65–140)

## 2022-08-19 PROCEDURE — 82948 REAGENT STRIP/BLOOD GLUCOSE: CPT

## 2022-08-19 PROCEDURE — 97535 SELF CARE MNGMENT TRAINING: CPT

## 2022-08-19 PROCEDURE — 99232 SBSQ HOSP IP/OBS MODERATE 35: CPT | Performed by: PHYSICIAN ASSISTANT

## 2022-08-19 PROCEDURE — 99232 SBSQ HOSP IP/OBS MODERATE 35: CPT | Performed by: INTERNAL MEDICINE

## 2022-08-19 PROCEDURE — 87081 CULTURE SCREEN ONLY: CPT | Performed by: INTERNAL MEDICINE

## 2022-08-19 RX ADMIN — MELATONIN 3 MG: at 22:27

## 2022-08-19 RX ADMIN — ACETAMINOPHEN 975 MG: 325 TABLET ORAL at 05:42

## 2022-08-19 RX ADMIN — MENTHOL, METHYL SALICYLATE: 10; 15 CREAM TOPICAL at 17:40

## 2022-08-19 RX ADMIN — METHOCARBAMOL 500 MG: 500 TABLET ORAL at 23:13

## 2022-08-19 RX ADMIN — NEPHROCAP 1 CAPSULE: 1 CAP ORAL at 17:35

## 2022-08-19 RX ADMIN — LEVOTHYROXINE SODIUM 50 MCG: 75 TABLET ORAL at 05:42

## 2022-08-19 RX ADMIN — SEVELAMER HYDROCHLORIDE 1600 MG: 800 TABLET ORAL at 17:35

## 2022-08-19 RX ADMIN — METHOCARBAMOL 500 MG: 500 TABLET ORAL at 17:35

## 2022-08-19 RX ADMIN — SERTRALINE HYDROCHLORIDE 75 MG: 50 TABLET ORAL at 08:08

## 2022-08-19 RX ADMIN — LORATADINE 10 MG: 10 TABLET ORAL at 08:08

## 2022-08-19 RX ADMIN — METHOCARBAMOL 500 MG: 500 TABLET ORAL at 11:51

## 2022-08-19 RX ADMIN — METOCLOPRAMIDE HYDROCHLORIDE 5 MG: 5 INJECTION INTRAMUSCULAR; INTRAVENOUS at 12:01

## 2022-08-19 RX ADMIN — INSULIN LISPRO 3 UNITS: 100 INJECTION, SOLUTION INTRAVENOUS; SUBCUTANEOUS at 17:37

## 2022-08-19 RX ADMIN — METOCLOPRAMIDE HYDROCHLORIDE 5 MG: 5 INJECTION INTRAMUSCULAR; INTRAVENOUS at 05:42

## 2022-08-19 RX ADMIN — CARVEDILOL 25 MG: 25 TABLET, FILM COATED ORAL at 08:08

## 2022-08-19 RX ADMIN — POLYETHYLENE GLYCOL 3350 17 G: 17 POWDER, FOR SOLUTION ORAL at 08:15

## 2022-08-19 RX ADMIN — METHOCARBAMOL 500 MG: 500 TABLET ORAL at 05:42

## 2022-08-19 RX ADMIN — PANTOPRAZOLE SODIUM 40 MG: 40 TABLET, DELAYED RELEASE ORAL at 05:42

## 2022-08-19 RX ADMIN — OXYCODONE HYDROCHLORIDE 10 MG: 10 TABLET ORAL at 17:34

## 2022-08-19 RX ADMIN — ATORVASTATIN CALCIUM 20 MG: 20 TABLET, FILM COATED ORAL at 17:36

## 2022-08-19 RX ADMIN — OXYCODONE HYDROCHLORIDE 10 MG: 10 TABLET ORAL at 04:11

## 2022-08-19 RX ADMIN — SEVELAMER HYDROCHLORIDE 1600 MG: 800 TABLET ORAL at 11:51

## 2022-08-19 RX ADMIN — ACETAMINOPHEN 975 MG: 325 TABLET ORAL at 14:57

## 2022-08-19 RX ADMIN — LIDOCAINE 5% 1 PATCH: 700 PATCH TOPICAL at 08:08

## 2022-08-19 RX ADMIN — NYSTATIN: 100000 POWDER TOPICAL at 08:17

## 2022-08-19 RX ADMIN — MENTHOL, METHYL SALICYLATE: 10; 15 CREAM TOPICAL at 22:28

## 2022-08-19 RX ADMIN — PREGABALIN 50 MG: 50 CAPSULE ORAL at 08:08

## 2022-08-19 RX ADMIN — INSULIN GLARGINE 8 UNITS: 100 INJECTION, SOLUTION SUBCUTANEOUS at 22:27

## 2022-08-19 RX ADMIN — WARFARIN SODIUM 10 MG: 5 TABLET ORAL at 17:35

## 2022-08-19 RX ADMIN — CARVEDILOL 25 MG: 25 TABLET, FILM COATED ORAL at 17:37

## 2022-08-19 RX ADMIN — SENNOSIDES 8.6 MG: 8.6 TABLET, FILM COATED ORAL at 08:15

## 2022-08-19 NOTE — ASSESSMENT & PLAN NOTE
· Followed by pain management as outpatient  · Appears she was briefly on short course of Percocet but does not appear to have chornic narcotics  · Seen by APS on recent admission  · Pain regimen as above

## 2022-08-19 NOTE — PLAN OF CARE
Problem: OCCUPATIONAL THERAPY ADULT  Goal: Performs self-care activities at highest level of function for planned discharge setting  See evaluation for individualized goals  Description: Treatment Interventions: ADL retraining, Functional transfer training, UE strengthening/ROM, Endurance training, Patient/family training, Equipment evaluation/education, Compensatory technique education, Energy conservation, Activityengagement          See flowsheet documentation for full assessment, interventions and recommendations     Note: Limitation: Decreased ADL status, Decreased Safe judgement during ADL, Decreased cognition, Decreased endurance, Decreased self-care trans, Decreased high-level ADLs  Prognosis: Fair  Assessment: Pt seen for OT session - required encouragement to participate indicating she "just woke up" and was tired- functionally able to complete all self care tasks in seated position after setup however requires min a for dynamic balance/safety in stance - min a transfers and mobility - reports s/o can assist at home however she has 13 steps to navigate to get in/out of home for HD - Continue to recommend inpt rehab 2* same - OT to continue to follow to address goals as stated on initial eval     OT Discharge Recommendation: Post acute rehabilitation services

## 2022-08-19 NOTE — PROGRESS NOTES
NEPHROLOGY PROGRESS NOTE   Farrukh Banks 56 y o  female MRN: [id number]  Unit/Bed#: -01 Encounter: [id number]    ASSESSMENT & PLAN:  56-year-old female known ESRD on HD TTS presented with status post fall and back pain  Recently discharged on 08/04 and had back pain at that time and there was concern for diskitis blood cultures were negative, was discharged on doxycycline given suspected cellulitis of left wrist   End-stage renal disease on dialysis  -Outpatient unit:  [address]  -Schedule:  TTS  -dry weight 123 kg, decreased to 120 5 kg on 08/16  -Access:  Left AV fistula  -Plan:  Last dialysis treatment was on 08/19, post dialysis weight was 122 4 kg which is slightly below the dry weight  Was not able to achieve recent post dialysis weight was 120 5 kg yesterday, will try for more UF next treatment but if not able to achieve may need to increase the dry weight  Next dialysis treatment tomorrow, clinically on exam appears euvolemic  Volume status  -clinically euvolemic  Continue to monitor    CKD/MBD  -hypocalcemia:  Admission calcium level was 6 8   Sensipar held since admission and calcium level improving  Last calcium level was 8 9 mg/dL  If Calcium level remains stable may be able to restart Sensipar at lower dose  Will follow-up outpatient labs and decide  hold Sensipar at the time of discharge  -hyperphosphatemia:  Continue Renagel  Previous phosphorus level was 6 3 on 08/04  Monitor phosphorus level    Blood pressure  -primary hypertension:  Blood pressure stable and at goal   Avoid hypotension    -was taken off nifedipine 30 mg daily on 08/15   Currently on Coreg 25 mg twice daily with hold parameters continue to monitor blood pressure   If blood pressure trends up may need to restart back on nifedipine      Electrolytes:   -hyponatremia:  Sodium 133, placed on fluid restriction 1 8 L per day    Anemia due to ESRD  -Goal hemoglobin:  10-11 grams/deciliter  -Current hemoglobin: Below goal at 8 0 g/dl  -Plan:  Continue to monitor  Not on MITCHELL due to history of PE  Monitor hemoglobin consider PRBC if it drops below 7    Status post fall with right-sided hip pain:  Imaging study without any bony abnormalities:  Management per primary team   Overall hip pain improving    Acute encephalopathy, suspected due to component of polypharmacy  - mental status has improved  May consider stopping Lyrica if mental status worsens    Chronic back pain:  Recent MRI during recent hospital admission showing L4-L5 osteomyelitis/diskitis  Blood culture during recent hospital admission was negative  Was seen by ID and Neurosurgery and was recommended no plan for antibiotic as there was no concern for active infection    Diabetes mellitus type 2:  Management per primary team    History of PE, currently on Coumadin    Left wrist cellulitis, completed doxycycline course on 8/14    Complain of constipation, patient is on MiraLax daily    Discussed with primary team, awaiting rehab placement, next HD tomorrow        SUBJECTIVE:  Still with c/o nausea early today and c/o right hip pain     OBJECTIVE:  Current Weight: Weight - Scale: 127 kg (280 lb)  Vitals:    08/19/22 0803   BP: 159/59   Pulse:    Resp:    Temp: 97 9 °F (36 6 °C)   SpO2:        Intake/Output Summary (Last 24 hours) at 8/19/2022 1148  Last data filed at 8/19/2022 0401  Gross per 24 hour   Intake 1260 ml   Output 3237 ml   Net -1977 ml       Physical Exam  General:  Ill looking, awake  Eyes: Conjunctivae pink,  Sclera anicteric  ENT: lips and mucous membranes moist  Neck: supple   Chest: Clear to Auscultation both lungs,  no crackles, ronchus or wheezing  CVS: S1 & S2 present, normal rate, regular rhythm, no murmur  Abdomen: soft, non-tender, non-distended, Bowel sounds normoactive  Extremities: no edema of  Legs    S/p TMA  Skin: no rash  Neuro: awake, alert, oriented  Psych: Mood and affect appropriate     Medications:    Current Facility-Administered Medications:     acetaminophen (TYLENOL) tablet 975 mg, 975 mg, Oral, Q8H [address], Anne Crews PA-C, 975 mg at 08/19/22 0542    albuterol (PROVENTIL HFA,VENTOLIN HFA) inhaler 2 puff, 2 puff, Inhalation, Q6H PRN, Helen Sykes MD    ALPRAZolam Audery Gleneagle) tablet 0 25 mg, 0 25 mg, Oral, BID PRN, Helen Sykes MD    atorvastatin (LIPITOR) tablet 20 mg, 20 mg, Oral, Daily With Rebecca Saavedra MD, 20 mg at 08/18/22 1840    b complex-vitamin C-folic acid (NEPHROCAPS) capsule 1 capsule, 1 capsule, Oral, Daily With Rebecca Saavedra MD, 1 capsule at 08/18/22 1839    carvedilol (COREG) tablet 25 mg, 25 mg, Oral, BID, Gwendolyn Villafana MD, 25 mg at 08/19/22 0808    dicyclomine (BENTYL) capsule 10 mg, 10 mg, Oral, TID PRN, Helen Sykes MD, 10 mg at 08/17/22 1753    insulin glargine (LANTUS) subcutaneous injection 8 Units 0 08 mL, 8 Units, Subcutaneous, HS, Helen Sykes MD, 8 Units at 08/18/22 2307    insulin lispro (HumaLOG) 100 units/mL subcutaneous injection 1-5 Units, 1-5 Units, Subcutaneous, TID AC, 1 Units at 08/18/22 1843 **AND** Fingerstick Glucose (POCT), , , TID ACHelen MD    insulin lispro (HumaLOG) 100 units/mL subcutaneous injection 1-5 Units, 1-5 Units, Subcutaneous, HS, Helne Sykes MD, 1 Units at 08/15/22 2129    insulin lispro (HumaLOG) 100 units/mL subcutaneous injection 3 Units, 3 Units, Subcutaneous, TID With Meals, Helen Sykes MD, 3 Units at 08/18/22 1843    levothyroxine tablet 50 mcg, 50 mcg, Oral, Early Morning, Helen Sykes MD, 50 mcg at 08/19/22 0542    lidocaine (LIDODERM) 5 % patch 1 patch, 1 patch, Topical, Daily, Helen Sykes MD, 1 patch at 08/19/22 0808    loratadine (CLARITIN) tablet 10 mg, 10 mg, Oral, Daily, Helen Sykes MD, 10 mg at 08/19/22 0646    melatonin tablet 3 mg, 3 mg, Oral, HS, Helen Sykes MD, 3 mg at 08/18/22 2300    menthol-methyl salicylate (BENGAY) 73-55 % cream, , Apply externally, 4x Daily PRN, Moses SERRA LILLIANA Rosales, Given at 08/16/22 1727    methocarbamol (ROBAXIN) tablet 500 mg, 500 mg, Oral, Q6H [address], Anne Crews PA-C, 500 mg at 08/19/22 0542    metoclopramide (REGLAN) injection 5 mg, 5 mg, Intravenous, Q6H PRN, Perla Tidwell PA-C, 5 mg at 08/19/22 0542    nystatin (MYCOSTATIN) powder, , Topical, BID, Annetta Salazar MD, Given at 08/19/22 6534    oxyCODONE (ROXICODONE) immediate release tablet 10 mg, 10 mg, Oral, Q4H PRN, Afua Crews PA-C, 10 mg at 08/19/22 0411    oxyCODONE (ROXICODONE) IR tablet 5 mg, 5 mg, Oral, Q4H PRN, Vicky Donaldson PA-C    pantoprazole (PROTONIX) EC tablet 40 mg, 40 mg, Oral, Early Morning, Annetta Salazar MD, 40 mg at 08/19/22 0542    polyethylene glycol (MIRALAX) packet 17 g, 17 g, Oral, Daily, Annetta Salazar MD, 17 g at 08/19/22 0815    pregabalin (LYRICA) capsule 50 mg, 50 mg, Oral, Daily, Annetta Salazar MD, 50 mg at 08/19/22 0808    senna (SENOKOT) tablet 17 2 mg, 2 tablet, Oral, HS PRN, Annetta Salazar MD    [facility]) tablet 8 6 mg, 1 tablet, Oral, Daily, Annetta Salazar MD, 8 6 mg at 08/19/22 0815    sertraline (ZOLOFT) tablet 75 mg, 75 mg, Oral, Daily, Annetta Salazar MD, 75 mg at 08/19/22 0808    sevelamer (RENAGEL) tablet 1,600 mg, 1,600 mg, Oral, TID With Meals, Annetta Salazar MD, 1,600 mg at 08/18/22 1839    simethicone (MYLICON) chewable tablet 80 mg, 80 mg, Oral, Q6H PRN, Ferdinand Cardona DO, 80 mg at 08/17/22 1155    warfarin (COUMADIN) tablet 10 mg, 10 mg, Oral, Daily (warfarin), Perla Tidwell PA-C, 10 mg at 08/18/22 1835    Invasive Devices:        Lab Results:   Results from last 7 days   Lab Units 08/18/22  0950 08/16/22  0447 08/13/22  0959 08/12/22  1515   WBC Thousand/uL  --  7 84 7 28 8 21   HEMOGLOBIN g/dL  --  8 0* 8 6* 8 1*   HEMATOCRIT %  --  25 9* 27 6* 25 6*   PLATELETS Thousands/uL  --  197 208 206   POTASSIUM mmol/L 4 5 4 7 4 4 5 3   CHLORIDE mmol/L 97 100 97 99   CO2 mmol/L 27 29 30 29   BUN mg/dL 31* 55* 40* 33*   CREATININE mg/dL 8 27* 10 60* 8 49* 7 06*   CALCIUM mg/dL 8 9 8 7 9 1 6 8*       Previous work up:      Portions of the record may have been created with voice recognition software  Occasional wrong word or "sound a like" substitutions may have occurred due to the inherent limitations of voice recognition software  Read the chart carefully and recognize, using context, where substitutions have occurred  If you have any questions, please contact the dictating provider

## 2022-08-19 NOTE — ASSESSMENT & PLAN NOTE
· Per record review, patient came to the hospital after 2 falls at home with right-sided hip/back pain that radiates down her right leg    She was recently in hospital with complaint of similar pain   · No acute osseous abnormality on CT scan RLE  · Pain control   · Continue scheduled Tylenol  · Continue oxy PRN  · IV Dilaudid was discontinued prior and would not resume at this time - no indication for IV narcotics  · Continue with lyrica, lidoderm patch  · Aqua K  · On Robaxin 500 mg q6h  · PT/OT recommending rehab at this time, CM following

## 2022-08-19 NOTE — ASSESSMENT & PLAN NOTE
· BP low normal  · Renal following, input appreciated  · Currently on Coreg 25 mg BID, holding home dose Nifedipine and Demadex

## 2022-08-19 NOTE — ASSESSMENT & PLAN NOTE
· Patient with previous history of PE  · Questionable compliance  · Continue coumadin   · INR therapeutic

## 2022-08-19 NOTE — ASSESSMENT & PLAN NOTE
· Patient reports ongoing N/V  · Was recommended to have gastric emptying study after EGD in November of 2021, this was not done   · Recommend she be evaluated for suspected gastroparesis, recommend GI referral on d/c   · PRN antiemetic  · KUB revealed unremarkable abdomen  · Patient reports having large BM 2 days ago

## 2022-08-19 NOTE — ASSESSMENT & PLAN NOTE
Lab Results   Component Value Date    HGBA1C 9 4 (H) 07/09/2022       Recent Labs     08/18/22  1843 08/18/22 2057 08/19/22  0457 08/19/22  1042   POCGLU 180* 84 126 127       Blood Sugar Average: Last 72 hrs:  (P) 115     · Continue with Lantus and sliding scale   · Monitor blood sugar  · Patient with poor p o  Intake, SO reports that when she eats she gets an upset stomach/vomits frequently    Recommend that she have GI evaluation for gastric emptying study as an outpt to evaluate for gastroparesis - she was recommended this by GI at EGD 11/2021

## 2022-08-19 NOTE — PROGRESS NOTES
[facility]  Progress Note Haseeb Olivas 1965, 56 y o  female MRN: [id number]  Unit/Bed#: MS Nash4-01 Encounter: [id number]  Primary Care Provider: Devendra Umana MD   Date and time admitted to hospital: 8/12/2022 11:43 AM    * [address]  · Per record review, patient came to the hospital after 2 falls at home with right-sided hip/back pain that radiates down her right leg  She was recently in hospital with complaint of similar pain   · No acute osseous abnormality on CT scan RLE  · Pain control   · Continue scheduled Tylenol  · Continue oxy PRN  · IV Dilaudid was discontinued prior and would not resume at this time - no indication for IV narcotics  · Continue with lyrica, lidoderm patch  · Aqua K  · On Robaxin 500 mg q6h  · PT/OT recommending rehab at this time, CM following    Concern for Osteomyelitis/Discitis of lumbar region  Assessment & Plan  · Patient came to the hospital recently with back pain and was evaluated by Neurosurgery and Infectious Disease  · No plan for any biopsy or surgical intervention    ESRD on hemodialysis  Assessment & Plan  Lab Results   Component Value Date    EGFR 4 08/18/2022    EGFR 3 08/16/2022    EGFR 4 08/13/2022    CREATININE 8 27 (H) 08/18/2022    CREATININE 10 60 (H) 08/16/2022    CREATININE 8 49 (H) 08/13/2022     · Patient gets dialysis on Tuesday Thursday Saturday  · Continue with Nephrocaps and sensipar and Renagel  · Nephrology following, appreciate their ongoing recs    Type 2 diabetes mellitus [facility])  Assessment & Plan  Lab Results   Component Value Date    HGBA1C 9 4 (H) 07/09/2022       Recent Labs     08/18/22  1843 08/18/22  2057 08/19/22  0457 08/19/22  1042   POCGLU 180* 84 126 127       Blood Sugar Average: Last 72 hrs:  (P) 115     · Continue with Lantus and sliding scale   · Monitor blood sugar  · Patient with poor p o  Intake, SO reports that when she eats she gets an upset stomach/vomits frequently  Recommend that she have GI evaluation for gastric emptying study as an outpt to evaluate for gastroparesis - she was recommended this by GI at EGD 11/2021     Essential hypertension  Assessment & Plan  · BP low normal  · Renal following, input appreciated  · Currently on Coreg 25 mg BID, holding home dose Nifedipine and Demadex    Morbid obesity  Assessment & Plan  · Body mass index is 45 19 kg/m²  · Encourage lifestyle modification, weight loss     Pulmonary embolus ([address] )  Assessment & Plan  · Patient with previous history of PE  · Questionable compliance  · Continue coumadin   · INR therapeutic    Nausea and vomiting  Assessment & Plan  · Patient reports ongoing N/V  · Was recommended to have gastric emptying study after EGD in November of 2021, this was not done   · Recommend she be evaluated for suspected gastroparesis, recommend GI referral on d/c   · PRN antiemetic  · KUB revealed unremarkable abdomen  · Patient reports having large BM 2 days ago    Chronic pain syndrome  Assessment & Plan  · Followed by pain management as outpatient  · Appears she was briefly on short course of Percocet but does not appear to have chornic narcotics  · Seen by APS on recent admission  · Pain regimen as above    Chronic anticoagulation  Assessment & Plan  · Patient is on Coumadin as outpatient  · Continue coumadin, adjust based on INR  Monitor     Hyperlipidemia  Assessment & Plan  · Continue with statin    Anxiety disorder  Assessment & Plan  · Continue with p r n  Xanax    Anemia of chronic disease  Assessment & Plan  · Hemoglobin at baseline  · Monitor    Acquired hypothyroidism  Assessment & Plan  · Continue Synthroid        VTE Pharmacologic Prophylaxis: VTE Score: 8 High Risk (Score >/= 5) - Pharmacological DVT Prophylaxis Ordered: warfarin (Coumadin)  Sequential Compression Devices Ordered  Patient Centered Rounds: I performed bedside rounds with nursing staff today   d/w RN  Discussions with Specialists or Other Care Team Provider:      Education and Discussions with Family / Patient: Patient declined call to   Time Spent for Care: 20 minutes  More than 50% of total time spent on counseling and coordination of care as described above  Current Length of Stay: 6 day(s)  Current Patient Status: Inpatient   Certification Statement: The patient will continue to require additional inpatient hospital stay due to pending rehab  Discharge Plan: Anticipate discharge in 24-48 hrs to rehab facility  Code Status: Level 1 - Full Code    Subjective:   Ms  Denece Leaver reports that she wants to talk to her boyfriend about rehab  She reports nausea and 1 episode of vomiting  She reports having a large BM 2 days ago  Denies CP, SOB  Reports that she "collapsed" but did not fall and was assisted by 2 staff members last night  Objective:     Vitals:   Temp (24hrs), Av 5 °F (36 9 °C), Min:97 9 °F (36 6 °C), Max:99 °F (37 2 °C)    Temp:  [97 9 °F (36 6 °C)-99 °F (37 2 °C)] 97 9 °F (36 6 °C)  Resp:  [18] 18  BP: (130-159)/(57-60) 159/59  Body mass index is 45 19 kg/m²  Input and Output Summary (last 24 hours): Intake/Output Summary (Last 24 hours) at 2022 1457  Last data filed at 2022 0401  Gross per 24 hour   Intake 960 ml   Output --   Net 960 ml       Physical Exam:   Physical Exam  Vitals reviewed  Exam conducted with a chaperone present  Constitutional:       General: She is not in acute distress  Appearance: She is obese  She is not diaphoretic  Comments: Patient seen sitting in bedside chair, NAD<  present   Cardiovascular:      Rate and Rhythm: Normal rate and regular rhythm  Pulmonary:      Effort: Pulmonary effort is normal  No respiratory distress  Breath sounds: Normal breath sounds  Abdominal:      General: Bowel sounds are normal       Palpations: Abdomen is soft  Tenderness: There is no abdominal tenderness     Musculoskeletal:      Right lower leg: Edema present  Left lower leg: Edema present  Skin:     General: Skin is warm  Neurological:      Mental Status: She is alert and oriented to person, place, and time     Psychiatric:         Mood and Affect: Mood normal          Additional Data:     Labs:  Results from last 7 days   Lab Units 08/16/22  0447   WBC Thousand/uL 7 84   HEMOGLOBIN g/dL 8 0*   HEMATOCRIT % 25 9*   PLATELETS Thousands/uL 197   NEUTROS PCT % 65   LYMPHS PCT % 18   MONOS PCT % 12   EOS PCT % 3     Results from last 7 days   Lab Units 08/18/22  0950   SODIUM mmol/L 133*   POTASSIUM mmol/L 4 5   CHLORIDE mmol/L 97   CO2 mmol/L 27   BUN mg/dL 31*   CREATININE mg/dL 8 27*   ANION GAP mmol/L 9   CALCIUM mg/dL 8 9   GLUCOSE RANDOM mg/dL 101     Results from last 7 days   Lab Units 08/18/22  0950   INR  2 76*     Results from last 7 days   Lab Units 08/19/22  1042 08/19/22  0457 08/18/22  2057 08/18/22  1843 08/18/22  1702 08/18/22  0639 08/17/22  2045 08/17/22  1555 08/17/22  1106 08/17/22  0603 08/16/22  2128 08/16/22  1621   POC GLUCOSE mg/dl 127 126 84 180* 165* 83 102 148* 79 82 103 141*               Lines/Drains:  Invasive Devices  Report    Peripheral Intravenous Line  Duration           Peripheral IV 08/19/22 Distal;Right;Upper;Ventral (anterior) Arm <1 day          Line  Duration           Hemodialysis AV Fistula 02/20/18 Left Forearm 1640 days                      Imaging: Reviewed radiology reports from this admission including: CT as above    Recent Cultures (last 7 days):         Last 24 Hours Medication List:   Current Facility-Administered Medications   Medication Dose Route Frequency Provider Last Rate    acetaminophen  975 mg Oral Q8H [facility] Anne Crews PA-C      albuterol  2 puff Inhalation Q6H PRN Radha Pierre MD      ALPRAZolam  0 25 mg Oral BID PRN Radha Pierre MD      atorvastatin  20 mg Oral Daily With Lydia Banks MD      b complex-vitamin C-folic acid  1 capsule Oral Daily With [facility] Narinder Palma MD      carvedilol  25 mg Oral BID Foster Nino MD      dicyclomine  10 mg Oral TID PRN Kayleigh Winslow MD      insulin glargine  8 Units Subcutaneous HS Kayleigh Winslow MD      insulin lispro  1-5 Units Subcutaneous TID [facility] Kayleigh Winslow MD      insulin lispro  1-5 Units Subcutaneous HS Kayleigh Winslow MD      insulin lispro  3 Units Subcutaneous TID With Meals Kayleigh Winslow MD      levothyroxine  50 mcg Oral Early Morning Kayleigh Winslow, MD      lidocaine  1 patch Topical Daily Kayleigh Winslow, MD      loratadine  10 mg Oral Daily Kayleigh Winslow, MD      melatonin  3 mg Oral HS Kayleigh Winslow MD      menthol-methyl salicylate   Apply externally 4x Daily PRN Neil Rosales PA-C      methocarbamol  500 mg Oral Q6H [address] Anne Crews PA-C      metoclopramide  5 mg Intravenous Q6H PRN Nilay Veliz PA-C      nystatin   Topical BID Kayleigh Winslow MD      oxyCODONE  10 mg Oral Q4H PRN Jony Cox PA-C      oxyCODONE  5 mg Oral Q4H PRN Jony Cox PA-C      pantoprazole  40 mg Oral Early Morning Kayleigh Winslow MD      polyethylene glycol  17 g Oral Daily Kayleigh Winslow MD      pregabalin  50 mg Oral Daily Kayleigh Winslow, MD      senna  2 tablet Oral HS PRN Kayleigh Winslow, MD      senna  1 tablet Oral Daily Kayleigh Winslow MD      sertraline  75 mg Oral Daily Kayleigh Winslow, MD      sevelamer  1,600 mg Oral TID With Meals Kayleigh Winslow MD      simethicone  80 mg Oral Q6H PRN Ferdinand Cardona DO      warfarin  10 mg Oral Daily (warfarin) Nilay Veliz PA-C          Today, Patient Was Seen By: Keyur Hewitt PA-C    **Please Note: This note may have been constructed using a voice recognition system  **

## 2022-08-19 NOTE — OCCUPATIONAL THERAPY NOTE
Occupational Therapy Progress Note     Patient Name: Whit Novoa  [id number] Date: 8/19/2022  Problem List  Principal Problem:    Fall  Active Problems:    Essential hypertension    ESRD on hemodialysis    Anxiety disorder    Nausea and vomiting    Secondary hyperparathyroidism of renal origin ([address] )    Anemia of chronic disease    Morbid obesity    Hyperlipidemia    Type 2 diabetes mellitus (HCC)    Acquired hypothyroidism    Pulmonary embolus (HCC)    Chronic pain syndrome    Chronic anticoagulation    Concern for Osteomyelitis/Discitis of lumbar region        08/19/22 1155   OT Last Visit   OT Visit Date 08/19/22   Note Type   Note Type Treatment   Restrictions/Precautions   Weight Bearing Precautions Per Order No   Other Precautions Fall Risk;Pain   Lifestyle   Autonomy I adls and mobility - used  in home and [facility] outside home - reports multiple falls - s/o assists with all iadls   Reciprocal Relationships supportive s/o who is able to provide assist prn   Service to Others disability   Intrinsic Gratification sedentary - does not id any particular interests   Pain Assessment   Pain Assessment Tool 0-10   Pain Score 4   Pain Location/Orientation Orientation: Right;Location: Hip   Hospital Pain Intervention(s) Repositioned; Ambulation/increased activity; Emotional support;Relaxation technique   ADL   Eating Assistance 7  Independent   Grooming Assistance 5  Supervision/Setup   UB Bathing Assistance 5  Supervision/Setup   LB Bathing Assistance 4  Minimal Assistance   UB Dressing Assistance 5  Supervision/Setup   LB Dressing Assistance 4  Minimal Assistance   Toileting Assistance  4  Minimal Assistance   Bed Mobility   Supine to Sit 5  Supervision   Transfers   Sit to Stand 4  Minimal assistance   Stand to Sit 4  Minimal assistance   Stand pivot 4  Minimal assistance   Functional Mobility   Functional Mobility 4  Minimal assistance   Additional Comments only tolerated short distances   Additional items Rolling walker Cognition   Arousal/Participation Arousable; Cooperative   Attention Attends with cues to redirect   Orientation Level Oriented X4   Memory Decreased recall of precautions   Following Commands Follows one step commands without difficulty   Comments slow to respond at times - reports she "just woke up"   Activity Tolerance   Activity Tolerance Patient limited by fatigue;Patient limited by pain;Treatment limited secondary to medical complications (Comment)   Assessment   Assessment Pt seen for OT session - required encouragement to participate indicating she "just woke up" and was tired- functionally able to complete all self care tasks in seated position after setup however requires min a for dynamic balance/safety in stance - min a transfers and mobility - reports s/o can assist at home however she has 13 steps to navigate to get in/out of home for HD - Continue to recommend inpt rehab 2* same - OT to continue to follow to address goals as stated on initial eval   Plan   Treatment Interventions ADL retraining;Functional transfer training;UE strengthening/ROM; Endurance training;Patient/family training;Equipment evaluation/education; Compensatory technique education; Activityengagement; Energy conservation   Goal Expiration Date 08/29/22   OT Treatment Day 2   OT Frequency 3-5x/wk   Recommendation   OT Discharge Recommendation Post acute rehabilitation services   AM-PAC Daily Activity Inpatient   Lower Body Dressing 3   Bathing 3   Toileting 3   Upper Body Dressing 3   Grooming 4   Eating 4   Daily Activity Raw Score 20   Daily Activity Standardized Score (Calc for Raw Score >=11) 42 03   AM-PAC Applied Cognition Inpatient   Following a Speech/Presentation 3   Understanding Ordinary Conversation 4   Taking Medications 3   Remembering Where Things Are Placed or Put Away 3   Remembering List of 4-5 Errands 3   Taking Care of Complicated Tasks 3   Applied Cognition Raw Score 19   Applied Cognition Standardized Score 39 [address], [place] no

## 2022-08-19 NOTE — ASSESSMENT & PLAN NOTE
Lab Results   Component Value Date    EGFR 4 08/18/2022    EGFR 3 08/16/2022    EGFR 4 08/13/2022    CREATININE 8 27 (H) 08/18/2022    CREATININE 10 60 (H) 08/16/2022    CREATININE 8 49 (H) 08/13/2022     · Patient gets dialysis on Tuesday Thursday Saturday      · Continue with Nephrocaps and sensipar and Renagel  · Nephrology following, appreciate their ongoing recs

## 2022-08-20 ENCOUNTER — APPOINTMENT (INPATIENT)
Dept: DIALYSIS | Facility: HOSPITAL | Age: 57
DRG: 539 | End: 2022-08-20
Attending: INTERNAL MEDICINE
Payer: MEDICARE

## 2022-08-20 LAB
ANION GAP SERPL CALCULATED.3IONS-SCNC: 9 MMOL/L (ref 4–13)
BASOPHILS # BLD AUTO: 0.04 THOUSANDS/ΜL (ref 0–0.1)
BASOPHILS NFR BLD AUTO: 1 % (ref 0–1)
BUN SERPL-MCNC: 32 MG/DL (ref 5–25)
CALCIUM SERPL-MCNC: 9 MG/DL (ref 8.3–10.1)
CHLORIDE SERPL-SCNC: 101 MMOL/L (ref 96–108)
CO2 SERPL-SCNC: 27 MMOL/L (ref 21–32)
CREAT SERPL-MCNC: 8.21 MG/DL (ref 0.6–1.3)
EOSINOPHIL # BLD AUTO: 0.18 THOUSAND/ΜL (ref 0–0.61)
EOSINOPHIL NFR BLD AUTO: 3 % (ref 0–6)
ERYTHROCYTE [DISTWIDTH] IN BLOOD BY AUTOMATED COUNT: 14.8 % (ref 11.6–15.1)
GFR SERPL CREATININE-BSD FRML MDRD: 4 ML/MIN/1.73SQ M
GLUCOSE SERPL-MCNC: 129 MG/DL (ref 65–140)
GLUCOSE SERPL-MCNC: 72 MG/DL (ref 65–140)
GLUCOSE SERPL-MCNC: 79 MG/DL (ref 65–140)
GLUCOSE SERPL-MCNC: 79 MG/DL (ref 65–140)
GLUCOSE SERPL-MCNC: 91 MG/DL (ref 65–140)
GLUCOSE SERPL-MCNC: 93 MG/DL (ref 65–140)
HCT VFR BLD AUTO: 26.7 % (ref 34.8–46.1)
HGB BLD-MCNC: 8.2 G/DL (ref 11.5–15.4)
IMM GRANULOCYTES # BLD AUTO: 0.03 THOUSAND/UL (ref 0–0.2)
IMM GRANULOCYTES NFR BLD AUTO: 1 % (ref 0–2)
INR PPP: 4.53 (ref 0.84–1.19)
LYMPHOCYTES # BLD AUTO: 1.46 THOUSANDS/ΜL (ref 0.6–4.47)
LYMPHOCYTES NFR BLD AUTO: 24 % (ref 14–44)
MCH RBC QN AUTO: 30.1 PG (ref 26.8–34.3)
MCHC RBC AUTO-ENTMCNC: 30.7 G/DL (ref 31.4–37.4)
MCV RBC AUTO: 98 FL (ref 82–98)
MONOCYTES # BLD AUTO: 0.6 THOUSAND/ΜL (ref 0.17–1.22)
MONOCYTES NFR BLD AUTO: 10 % (ref 4–12)
NEUTROPHILS # BLD AUTO: 3.8 THOUSANDS/ΜL (ref 1.85–7.62)
NEUTS SEG NFR BLD AUTO: 61 % (ref 43–75)
NRBC BLD AUTO-RTO: 0 /100 WBCS
PLATELET # BLD AUTO: 205 THOUSANDS/UL (ref 149–390)
PMV BLD AUTO: 10.4 FL (ref 8.9–12.7)
POTASSIUM SERPL-SCNC: 4.4 MMOL/L (ref 3.5–5.3)
PROTHROMBIN TIME: 43.3 SECONDS (ref 11.6–14.5)
RBC # BLD AUTO: 2.72 MILLION/UL (ref 3.81–5.12)
SODIUM SERPL-SCNC: 137 MMOL/L (ref 135–147)
WBC # BLD AUTO: 6.11 THOUSAND/UL (ref 4.31–10.16)

## 2022-08-20 PROCEDURE — 99232 SBSQ HOSP IP/OBS MODERATE 35: CPT | Performed by: PHYSICIAN ASSISTANT

## 2022-08-20 PROCEDURE — 85610 PROTHROMBIN TIME: CPT | Performed by: PHYSICIAN ASSISTANT

## 2022-08-20 PROCEDURE — 82948 REAGENT STRIP/BLOOD GLUCOSE: CPT

## 2022-08-20 PROCEDURE — 90935 HEMODIALYSIS ONE EVALUATION: CPT | Performed by: INTERNAL MEDICINE

## 2022-08-20 PROCEDURE — 85025 COMPLETE CBC W/AUTO DIFF WBC: CPT | Performed by: INTERNAL MEDICINE

## 2022-08-20 PROCEDURE — 80048 BASIC METABOLIC PNL TOTAL CA: CPT | Performed by: INTERNAL MEDICINE

## 2022-08-20 RX ORDER — INSULIN GLARGINE 100 [IU]/ML
4 INJECTION, SOLUTION SUBCUTANEOUS
Status: DISCONTINUED | OUTPATIENT
Start: 2022-08-20 | End: 2022-08-21 | Stop reason: HOSPADM

## 2022-08-20 RX ADMIN — METHOCARBAMOL 500 MG: 500 TABLET ORAL at 18:18

## 2022-08-20 RX ADMIN — PREGABALIN 50 MG: 50 CAPSULE ORAL at 08:37

## 2022-08-20 RX ADMIN — NEPHROCAP 1 CAPSULE: 1 CAP ORAL at 18:19

## 2022-08-20 RX ADMIN — MELATONIN 3 MG: at 23:22

## 2022-08-20 RX ADMIN — METOCLOPRAMIDE HYDROCHLORIDE 5 MG: 5 INJECTION INTRAMUSCULAR; INTRAVENOUS at 09:13

## 2022-08-20 RX ADMIN — NYSTATIN: 100000 POWDER TOPICAL at 18:36

## 2022-08-20 RX ADMIN — OXYCODONE HYDROCHLORIDE 5 MG: 5 TABLET ORAL at 23:15

## 2022-08-20 RX ADMIN — INSULIN LISPRO 3 UNITS: 100 INJECTION, SOLUTION INTRAVENOUS; SUBCUTANEOUS at 18:34

## 2022-08-20 RX ADMIN — SERTRALINE HYDROCHLORIDE 75 MG: 50 TABLET ORAL at 08:38

## 2022-08-20 RX ADMIN — ATORVASTATIN CALCIUM 20 MG: 20 TABLET, FILM COATED ORAL at 18:19

## 2022-08-20 RX ADMIN — INSULIN GLARGINE 4 UNITS: 100 INJECTION, SOLUTION SUBCUTANEOUS at 23:23

## 2022-08-20 RX ADMIN — OXYCODONE HYDROCHLORIDE 10 MG: 10 TABLET ORAL at 08:40

## 2022-08-20 RX ADMIN — DICYCLOMINE HYDROCHLORIDE 10 MG: 10 CAPSULE ORAL at 18:19

## 2022-08-20 RX ADMIN — SEVELAMER HYDROCHLORIDE 1600 MG: 800 TABLET ORAL at 14:13

## 2022-08-20 RX ADMIN — CARVEDILOL 25 MG: 25 TABLET, FILM COATED ORAL at 08:38

## 2022-08-20 RX ADMIN — METHOCARBAMOL 500 MG: 500 TABLET ORAL at 23:15

## 2022-08-20 RX ADMIN — ACETAMINOPHEN 975 MG: 325 TABLET ORAL at 14:13

## 2022-08-20 RX ADMIN — LORATADINE 10 MG: 10 TABLET ORAL at 08:38

## 2022-08-20 RX ADMIN — CARVEDILOL 25 MG: 25 TABLET, FILM COATED ORAL at 18:24

## 2022-08-20 RX ADMIN — MENTHOL, METHYL SALICYLATE: 10; 15 CREAM TOPICAL at 08:44

## 2022-08-20 RX ADMIN — PANTOPRAZOLE SODIUM 40 MG: 40 TABLET, DELAYED RELEASE ORAL at 06:08

## 2022-08-20 RX ADMIN — SEVELAMER HYDROCHLORIDE 1600 MG: 800 TABLET ORAL at 18:19

## 2022-08-20 RX ADMIN — ACETAMINOPHEN 975 MG: 325 TABLET ORAL at 23:15

## 2022-08-20 RX ADMIN — NYSTATIN: 100000 POWDER TOPICAL at 14:14

## 2022-08-20 RX ADMIN — METHOCARBAMOL 500 MG: 500 TABLET ORAL at 06:08

## 2022-08-20 RX ADMIN — ACETAMINOPHEN 975 MG: 325 TABLET ORAL at 06:08

## 2022-08-20 RX ADMIN — LIDOCAINE 5% 1 PATCH: 700 PATCH TOPICAL at 08:38

## 2022-08-20 RX ADMIN — METHOCARBAMOL 500 MG: 500 TABLET ORAL at 14:13

## 2022-08-20 RX ADMIN — LEVOTHYROXINE SODIUM 50 MCG: 75 TABLET ORAL at 06:08

## 2022-08-20 NOTE — ASSESSMENT & PLAN NOTE
· Patient with previous history of PE  · Questionable compliance  · Hold coumadin   · INR  Supra therapeutic

## 2022-08-20 NOTE — CASE MANAGEMENT
Case Management Assessment & Discharge Planning Note    Patient name Castro Oquendo  Location /-63 MRN [id number]  : 1965 Date 2022       Current Admission Date: 2022  Current Admission Diagnosis:Fall   Patient Active Problem List    Diagnosis Date Noted    Intractable back pain 2022    Problem with vascular access 2022    Concern for Osteomyelitis/Discitis of lumbar region 2022    Fall 2022    Hypoxia 2022    COVID-19 2022    Right knee pain 2022    Constipation 2022    Pruritus 2021    Postop check 2021    Sigmoid diverticulitis 2021    Pneumonia 2021    Chronic anticoagulation 2021    Arteriovenous fistula for hemodialysis in place, primary [facility] ) 2021    Healthcare-associated pneumonia 2021    Right foot pain 2021    A-V fistula ([facility] ) 2021    Chronic pain syndrome 2021    Bilateral primary osteoarthritis of knee 2021    Bilateral patellofemoral syndrome 2021    Tinea unguium 2020    S/P foot surgery, right 2020    History of claustrophobia 2020    Morbid obesity 2020    Hyperlipidemia 2020    Diabetic polyneuropathy associated with type 2 diabetes mellitus ([facility] ) 2020    Encounter for diabetic foot exam ([facility] ) 2020    Corns and callosities 2020    History of transmetatarsal amputation of right foot [facility] ) 2020    Flu-like symptoms 2020    Lumbar radiculopathy 2020    Low back pain with sciatica 2020    Hemodialysis-associated hypotension 2019    Hyponatremia 2019    Parenchymal renal hypertension 2019    Candidiasis of breast 2019    Hyperkalemia 2019    Anemia of chronic disease 2019    Disruption of internal surgical wound 2019    Dyspnea 2019    Secondary hyperparathyroidism of renal origin ([facility] ) 2019  Nausea and vomiting 04/26/2019    Meningioma ([facility] ) 04/18/2019    Concussion without loss of consciousness 03/15/2019    Colon cancer screening 03/05/2019    Gastroesophageal reflux disease 03/05/2019    Primary osteoarthritis of right knee 10/25/2018    Hemodialysis status ([facility] ) 10/23/2018    Knee pain 10/22/2018    Anxiety disorder 04/06/2017    Acquired hypothyroidism 07/22/2016    Glaucoma 07/01/2016    ESRD on hemodialysis 07/01/2016    Abnormal uterine bleeding 02/15/2016    History of venous thromboembolism 02/14/2016    Pulmonary embolus ([facility] ) 10/30/2015    Cervical dysplasia 05/03/2015    Chronic endometritis 10/17/2014    Tinea pedis 10/02/2014    Benign neoplasm of skin 09/25/2014    Type 2 diabetes mellitus ([place] [address] ) 09/04/2014    Phlebitis and thrombophlebitis of superficial vessels of lower extremities 04/10/2014    Limb pain 11/25/2013    Mononeuritis of upper limb, unspecified laterality 11/25/2013    Legal blindness, as defined in [facility] 87/84/1475    Complication from renal dialysis device 04/22/2013    Essential hypertension 10/15/2012    Cardiomyopathy ([place] [address] ) 09/26/2012    Esophageal reflux 08/20/2012    Allergic rhinitis 08/20/2012      LOS (days): 7  Geometric Mean LOS (GMLOS) (days): 5 90  Days to GMLOS:-1 2     OBJECTIVE:  [facility]  of Unplanned Readmission Score: 58 84         Current admission status: Inpatient       Preferred Pharmacy:   [id number] - [address]  [address] [place] [address]  Phone: [phone] Fax: [phone] [facility], [address]  [address] [place] [address]  Phone: [phone] Fax: [phone]    Primary Care Provider: Monica Pinedo MD    Primary Insurance: MEDICARE  Secondary Insurance: Carissa Coates    ASSESSMENT:  [facility] Health Care Representative - Significant Other   Primary Phone: [phone] [facility])  Home Phone: [phone]                                                                DISCHARGE DETAILS:    Discharge planning discussed with[de-identified] Cm rec'd TT from medical team, per medical team, pt is medically stable for discharged pending dispo  PT recommending inpt rehab  per chart reviewed, blanket referral placed and [place] [facility] following pending pt agreeable  Cm spoke to pt concerning recommendation and pt reported needs to speak to boyfriend tomm to discuss tomm for decision  Cm will continue to follow

## 2022-08-20 NOTE — PLAN OF CARE
Post-Dialysis RN Treatment Note    Blood Pressure:  Pre 148/122 mm/Hg  Post 101/65 mmHg   EDW  120 5 kg    Weight:  Pre 124 1 kg   Post 122 0 kg   Mode of weight measurement: Standing Scale   Volume Removed  1500 ml    Treatment duration 210 minutes    NS given  no   Treatment shortened?  No   Medications given during Rx Not Applicable   Estimated Kt/V  1 2   Access type: AV fistula   Access Issues: No    Report called to primary nurse   Yes Keyona Canela RN  For 3 5 hr tx, 3L net UF 2K bath  Problem: METABOLIC, FLUID AND ELECTROLYTES - ADULT  Goal: Electrolytes maintained within normal limits  Description: INTERVENTIONS:  - Monitor labs and assess patient for signs and symptoms of electrolyte imbalances  - Administer electrolyte replacement as ordered  - Monitor response to electrolyte replacements, including repeat lab results as appropriate  - Instruct patient on fluid and nutrition as appropriate  Outcome: Progressing  Goal: Fluid balance maintained  Description: INTERVENTIONS:  - Monitor labs   - Monitor I/O and WT  - Instruct patient on fluid and nutrition as appropriate  - Assess for signs & symptoms of volume excess or deficit  Outcome: Progressing

## 2022-08-20 NOTE — PLAN OF CARE
Problem: MOBILITY - ADULT  Goal: Maintain or return to baseline ADL function  Description: INTERVENTIONS:  -  Assess patient's ability to carry out ADLs; assess patient's baseline for ADL function and identify physical deficits which impact ability to perform ADLs (bathing, care of mouth/teeth, toileting, grooming, dressing, etc )  - Assess/evaluate cause of self-care deficits   - Assess range of motion  - Assess patient's mobility; develop plan if impaired  - Assess patient's need for assistive devices and provide as appropriate  - Encourage maximum independence but intervene and supervise when necessary  - Involve family in performance of ADLs  - Assess for home care needs following discharge   - Consider OT consult to assist with ADL evaluation and planning for discharge  - Provide patient education as appropriate  Outcome: Progressing  Goal: Maintains/Returns to pre admission functional level  Description: INTERVENTIONS:  - Perform BMAT or MOVE assessment daily    - Set and communicate daily mobility goal to care team and patient/family/caregiver  - Collaborate with rehabilitation services on mobility goals if consulted  - Perform Range of Motion  times a day  - Reposition patient every  hours    - Dangle patient  times a day  - Stand patient  times a day  - Ambulate patient  times a day  - Out of bed to chair  times a day   - Out of bed for meals  times a day  - Out of bed for toileting  - Record patient progress and toleration of activity level   Outcome: Progressing     Problem: PAIN - ADULT  Goal: Verbalizes/displays adequate comfort level or baseline comfort level  Description: Interventions:  - Encourage patient to monitor pain and request assistance  - Assess pain using appropriate pain scale  - Administer analgesics based on type and severity of pain and evaluate response  - Implement non-pharmacological measures as appropriate and evaluate response  - Consider cultural and social influences on pain and pain management  - Notify physician/advanced practitioner if interventions unsuccessful or patient reports new pain  Outcome: Progressing     Problem: INFECTION - ADULT  Goal: Absence or prevention of progression during hospitalization  Description: INTERVENTIONS:  - Assess and monitor for signs and symptoms of infection  - Monitor lab/diagnostic results  - Monitor all insertion sites, i e  indwelling lines, tubes, and drains  - Monitor endotracheal if appropriate and nasal secretions for changes in amount and color  - Tad appropriate cooling/warming therapies per order  - Administer medications as ordered  - Instruct and encourage patient and family to use good hand hygiene technique  - Identify and instruct in appropriate isolation precautions for identified infection/condition  Outcome: Progressing  Goal: Absence of fever/infection during neutropenic period  Description: INTERVENTIONS:  - Monitor WBC    Outcome: Progressing     Problem: SAFETY ADULT  Goal: Maintain or return to baseline ADL function  Description: INTERVENTIONS:  -  Assess patient's ability to carry out ADLs; assess patient's baseline for ADL function and identify physical deficits which impact ability to perform ADLs (bathing, care of mouth/teeth, toileting, grooming, dressing, etc )  - Assess/evaluate cause of self-care deficits   - Assess range of motion  - Assess patient's mobility; develop plan if impaired  - Assess patient's need for assistive devices and provide as appropriate  - Encourage maximum independence but intervene and supervise when necessary  - Involve family in performance of ADLs  - Assess for home care needs following discharge   - Consider OT consult to assist with ADL evaluation and planning for discharge  - Provide patient education as appropriate  Outcome: Progressing  Goal: Maintains/Returns to pre admission functional level  Description: INTERVENTIONS:  - Perform BMAT or MOVE assessment daily    - Set and communicate daily mobility goal to care team and patient/family/caregiver  - Collaborate with rehabilitation services on mobility goals if consulted  - Perform Range of Motion  times a day  - Reposition patient every  hours    - Dangle patient  times a day  - Stand patient  times a day  - Ambulate patient  times a day  - Out of bed to chair  times a day   - Out of bed for meals  times a day  - Out of bed for toileting  - Record patient progress and toleration of activity level   Outcome: Progressing  Goal: Patient will remain free of falls  Description: INTERVENTIONS:  - Educate patient/family on patient safety including physical limitations  - Instruct patient to call for assistance with activity   - Consult OT/PT to assist with strengthening/mobility   - Keep Call bell within reach  - Keep bed low and locked with side rails adjusted as appropriate  - Keep care items and personal belongings within reach  - Initiate and maintain comfort rounds  - Make Fall Risk Sign visible to staff  - Offer Toileting every  Hours, in advance of need  - Initiate/Maintain alarm  - Obtain necessary fall risk management equipment:   - Apply yellow socks and bracelet for high fall risk patients  - Consider moving patient to room near nurses station  Outcome: Progressing     Problem: DISCHARGE PLANNING  Goal: Discharge to home or other facility with appropriate resources  Description: INTERVENTIONS:  - Identify barriers to discharge w/patient and caregiver  - Arrange for needed discharge resources and transportation as appropriate  - Identify discharge learning needs (meds, wound care, etc )  - Arrange for interpretive services to assist at discharge as needed  - Refer to Case Management Department for coordinating discharge planning if the patient needs post-hospital services based on physician/advanced practitioner order or complex needs related to functional status, cognitive ability, or social support system  Outcome: Progressing Problem: Knowledge Deficit  Goal: Patient/family/caregiver demonstrates understanding of disease process, treatment plan, medications, and discharge instructions  Description: Complete learning assessment and assess knowledge base    Interventions:  - Provide teaching at level of understanding  - Provide teaching via preferred learning methods  Outcome: Progressing     Problem: Potential for Falls  Goal: Patient will remain free of falls  Description: INTERVENTIONS:  - Educate patient/family on patient safety including physical limitations  - Instruct patient to call for assistance with activity   - Consult OT/PT to assist with strengthening/mobility   - Keep Call bell within reach  - Keep bed low and locked with side rails adjusted as appropriate  - Keep care items and personal belongings within reach  - Initiate and maintain comfort rounds  - Make Fall Risk Sign visible to staff  - Offer Toileting every  Hours, in advance of need  - Initiate/Maintain alarm  - Obtain necessary fall risk management equipment:  - Apply yellow socks and bracelet for high fall risk patients  - Consider moving patient to room near nurses station  Outcome: Progressing     Problem: Prexisting or High Potential for Compromised Skin Integrity  Goal: Skin integrity is maintained or improved  Description: INTERVENTIONS:  - Identify patients at risk for skin breakdown  - Assess and monitor skin integrity  - Assess and monitor nutrition and hydration status  - Monitor labs   - Assess for incontinence   - Turn and reposition patient  - Assist with mobility/ambulation  - Relieve pressure over bony prominences  - Avoid friction and shearing  - Provide appropriate hygiene as needed including keeping skin clean and dry  - Evaluate need for skin moisturizer/barrier cream  - Collaborate with interdisciplinary team   - Patient/family teaching  - Consider wound care consult   Outcome: Progressing     Problem: METABOLIC, FLUID AND ELECTROLYTES - ADULT  Goal: Electrolytes maintained within normal limits  Description: INTERVENTIONS:  - Monitor labs and assess patient for signs and symptoms of electrolyte imbalances  - Administer electrolyte replacement as ordered  - Monitor response to electrolyte replacements, including repeat lab results as appropriate  - Instruct patient on fluid and nutrition as appropriate  Outcome: Progressing  Goal: Fluid balance maintained  Description: INTERVENTIONS:  - Monitor labs   - Monitor I/O and WT  - Instruct patient on fluid and nutrition as appropriate  - Assess for signs & symptoms of volume excess or deficit  Outcome: Progressing  Goal: Glucose maintained within target range  Description: INTERVENTIONS:  - Monitor Blood Glucose as ordered  - Assess for signs and symptoms of hyperglycemia and hypoglycemia  - Administer ordered medications to maintain glucose within target range  - Assess nutritional intake and initiate nutrition service referral as needed  Outcome: Progressing

## 2022-08-20 NOTE — PROGRESS NOTES
[facility]  Progress Note Richard Conteh 1965, 56 y o  female MRN: [id number]  Unit/Bed#: -01 Encounter: [id number]  Primary Care Provider: Anamaria Castillo MD   Date and time admitted to hospital: 8/12/2022 11:43 AM    * [address]  · Per record review, patient came to the hospital after 2 falls at home with right-sided hip/back pain that radiates down her right leg  She was recently in hospital with complaint of similar pain   · No acute osseous abnormality on CT scan RLE  · Pain control   · Continue scheduled Tylenol  · Continue oxy PRN  · IV Dilaudid was discontinued prior and would not resume at this time - no indication for IV narcotics  · Continue with lyrica, lidoderm patch  · Aqua K  · On Robaxin 500 mg q6h  · PT/OT recommending rehab at this time, CM following - patient deciding if she would rather go home with [facility]    Concern for Osteomyelitis/Discitis of lumbar region  Assessment & Plan  · Patient came to the hospital recently with back pain and was evaluated by Neurosurgery and Infectious Disease  · No plan for any biopsy or surgical intervention    ESRD on hemodialysis  Assessment & Plan  Lab Results   Component Value Date    EGFR 4 08/20/2022    EGFR 4 08/18/2022    EGFR 3 08/16/2022    CREATININE 8 21 (H) 08/20/2022    CREATININE 8 27 (H) 08/18/2022    CREATININE 10 60 (H) 08/16/2022     · Patient gets dialysis on Tuesday Thursday Saturday  · Continue with Nephrocaps and sensipar and Renagel  · Nephrology following, appreciate their ongoing recs    Morbid obesity  Assessment & Plan  Body mass index is 45 19 kg/m²    · Encourage lifestyle modification, weight loss     Type 2 diabetes mellitus [facility])  Assessment & Plan  Lab Results   Component Value Date    HGBA1C 9 4 (H) 07/09/2022       Recent Labs     08/19/22  1941 08/19/22  2227 08/20/22  0646 08/20/22  1359   POCGLU 57* 194* 79 79       Blood Sugar Average: Last 72 hrs:  (P) 515 [id number]     · Continue with Lantus and sliding scale - blood sugars running low, will decrease lantus  · Monitor blood sugar  · Patient with poor p o  Intake, SO reports that when she eats she gets an upset stomach/vomits frequently  Recommend that she have GI evaluation for gastric emptying study as an outpt to evaluate for gastroparesis - she was recommended this by GI at EGD 11/2021     Pulmonary embolus [facility])  Assessment & Plan  · Patient with previous history of PE  · Questionable compliance  · Hold coumadin   · INR  Supra therapeutic    Chronic pain syndrome  Assessment & Plan  · Followed by pain management as outpatient  · Appears she was briefly on short course of Percocet but does not appear to have chornic narcotics  · Seen by APS on recent admission  · Pain regimen as above    Chronic anticoagulation  Assessment & Plan  · Patient is on Coumadin as outpatient  · Hold coumadin, adjust based on INR  Monitor     Essential hypertension  Assessment & Plan  · BP low normal  · Renal following, input appreciated  · Currently on Coreg 25 mg BID, holding home dose Nifedipine and Demadex          VTE Pharmacologic Prophylaxis: VTE Score: 8 High Risk (Score >/= 5) - Pharmacological DVT Prophylaxis Ordered: warfarin (Coumadin)  Sequential Compression Devices Ordered  Patient Centered Rounds: I performed bedside rounds with nursing staff today  Discussions with Specialists or Other Care Team Provider: case management    Education and Discussions with Family / Patient: Updated  (significant other) at bedside  Time Spent for Care: 30 minutes  More than 50% of total time spent on counseling and coordination of care as described above      Current Length of Stay: 7 day(s)  Current Patient Status: Inpatient   Certification Statement: The patient will continue to require additional inpatient hospital stay due to dishcarge planning  Discharge Plan: Anticipate discharge tomorrow to home with home services  Code Status: Level 1 - Full Code    Subjective:   C/O right sided back pain radiating down her leg, but not past her knee  States it was worse in the morning and is now getting better  Objective:     Vitals:   Temp (24hrs), Av 2 °F (36 8 °C), Min:97 4 °F (36 3 °C), Max:98 6 °F (37 °C)    Temp:  [97 4 °F (36 3 °C)-98 6 °F (37 °C)] 97 4 °F (36 3 °C)  HR:  [2-79] 76  Resp:  [16] 16  BP: ()/() 101/65  Body mass index is 45 19 kg/m²  Input and Output Summary (last 24 hours): Intake/Output Summary (Last 24 hours) at 2022 1440  Last data filed at 2022 1307  Gross per 24 hour   Intake 500 ml   Output --   Net 500 ml       Physical Exam:   Physical Exam  Vitals and nursing note reviewed  Constitutional:       General: She is not in acute distress  Appearance: She is well-developed  HENT:      Head: Normocephalic and atraumatic  Eyes:      Conjunctiva/sclera: Conjunctivae normal    Cardiovascular:      Rate and Rhythm: Normal rate and regular rhythm  Heart sounds: No murmur heard  Pulmonary:      Effort: Pulmonary effort is normal  No respiratory distress  Breath sounds: Normal breath sounds  Abdominal:      Palpations: Abdomen is soft  Tenderness: There is no abdominal tenderness  Musculoskeletal:      Cervical back: Neck supple  Skin:     General: Skin is warm and dry  Neurological:      Mental Status: She is alert            Additional Data:     Labs:  Results from last 7 days   Lab Units 22  0944   WBC Thousand/uL 6 11   HEMOGLOBIN g/dL 8 2*   HEMATOCRIT % 26 7*   PLATELETS Thousands/uL 205   NEUTROS PCT % 61   LYMPHS PCT % 24   MONOS PCT % 10   EOS PCT % 3     Results from last 7 days   Lab Units 22  0944   SODIUM mmol/L 137   POTASSIUM mmol/L 4 4   CHLORIDE mmol/L 101   CO2 mmol/L 27   BUN mg/dL 32*   CREATININE mg/dL 8 21*   ANION GAP mmol/L 9   CALCIUM mg/dL 9 0   GLUCOSE RANDOM mg/dL 93     Results from last 7 days Lab Units 08/20/22  0944   INR  4 53*     Results from last 7 days   Lab Units 08/20/22  1359 08/20/22  0646 08/19/22  2227 08/19/22  1941 08/19/22  1555 08/19/22  1042 08/19/22  0457 08/18/22  2057 08/18/22  1843 08/18/22  1702 08/18/22  0639 08/17/22  2045   POC GLUCOSE mg/dl 79 79 194* 57* 118 127 126 84 180* 165* 83 102               Lines/Drains:  Invasive Devices  Report    Peripheral Intravenous Line  Duration           Peripheral IV 08/19/22 Distal;Right;Upper;Ventral (anterior) Arm 1 day          Line  Duration           Hemodialysis AV Fistula 02/20/18 Left Forearm 1641 days                      Imaging: Reviewed radiology reports from this admission including: CT lower extremity    Recent Cultures (last 7 days):         Last 24 Hours Medication List:   Current Facility-Administered Medications   Medication Dose Route Frequency Provider Last Rate    acetaminophen  975 mg Oral Q8H [facility] Anne Crews PA-C      albuterol  2 puff Inhalation Q6H PRN Jennifer Phipps MD      ALPRAZolam  0 25 mg Oral BID PRN Jennifer Phipps MD      atorvastatin  20 mg Oral Daily With Young Galvan MD      b complex-vitamin C-folic acid  1 capsule Oral Daily With Young Galvan MD      carvedilol  25 mg Oral BID Booker Ellis MD      dicyclomine  10 mg Oral TID PRN Jennifer Phipps MD      insulin glargine  4 Units Subcutaneous HS Saray Pino PA-C      insulin lispro  1-5 Units Subcutaneous TID [facility] Jennifer Phipps MD      insulin lispro  1-5 Units Subcutaneous HS Jennifer Phipps MD      insulin lispro  3 Units Subcutaneous TID With Meals Jennifer Phipps MD      levothyroxine  50 mcg Oral Early Morning Jennifer Phipps MD      lidocaine  1 patch Topical Daily Jennifer Phipps MD      loratadine  10 mg Oral Daily Jennifer Phipps MD      melatonin  3 mg Oral HS Jennifer Phipps MD      menthol-methyl salicylate   Apply externally 4x Daily PRN Jonas Darvin Rosales PA-C      methocarbamol  500 mg Oral Q6H [facility] Vearl Ringer, LILLIANA      metoclopramide  5 mg Intravenous Q6H PRN Yanely Cook PA-C      nystatin   Topical BID Adam Hernandez MD      oxyCODONE  10 mg Oral Q4H PRN Hermelindaarl LILLIANA [place]      oxyCODONE  5 mg Oral Q4H PRN Vearl RingerLILLIANA      pantoprazole  40 mg Oral Early Morning Adam Hernandez MD      polyethylene glycol  17 g Oral Daily Adam Hernandez MD      pregabalin  50 mg Oral Daily Adam Hernandez MD      senna  2 tablet Oral HS PRN Adam Hernandez MD      senna  1 tablet Oral Daily Adam Hernandez MD      sertraline  75 mg Oral Daily Adam Hernandez MD      sevelamer  1,600 mg Oral TID With Meals Adam Hernandez MD      simethicone  80 mg Oral Q6H PRN Raymundo Osborne DO          Today, Patient Was Seen By: Dean Childress PA-C    **Please Note: This note may have been constructed using a voice recognition system  **

## 2022-08-20 NOTE — PROGRESS NOTES
NEPHROLOGY PROGRESS NOTE   Duane Horton 56 y o  female MRN: [id number]  Unit/Bed#: -01 Encounter: [id number]      Hemodialysis procedure note:  Patient was seen on hemodialysis approximately 10:35 a m  Her blood pressures were in the 03J to 90 systolic range in she feels somewhat lightheaded, she was given her morning carvedilol dose and pain medication which is likely the reason her blood pressure is low  Will monitor symptoms, decreased goal to 2 L, if blood pressure drops or any lightheadedness will plan on reinfusion of normal saline  And if still symptomatic will consider terminating treatment and plan on treatment on Monday    [address] [facility] old female with a history of end-stage kidney disease on hemodialysis Tuesday Thursday Saturday presented status post fall and back pain    1  End-stage kidney disease on hemodialysis  o Tuesday Thursday Saturday at [address]  o Dry weight of 123 kg-she did get down to 120 5 kg however her blood pressure has been low, will attempt 2 L today  o Using left AV fistula  o Continue current treatment schedule     2  Volume overload  o Clinically with some lower extremity edema but overall stable respiratory status will monitor    3  CKD bone mineral disease  o History of hypercalcemia so was on Sensipar but most recent calcium was 6 8 so this was held and now calcium is improving  o Hyperphosphatemia on Renagel with phosphorus level of 6 3 will continue to monitor     4  Anemia in the setting of end-stage kidney disease  o Current hemoglobin is low she has a history of PEs and is not on an MITCHELL monitor to see if it drops below 7    5  Acute encephalopathy  o Component of polypharmacy  o Stops it sends but is easily directable  o Blood pressures on the lower side will monitor    6   Back pain  o Recent MRI showing L4-L5 osteomyelitis with diskitis  o Blood cultures have been negative  o Was seen by infectious disease and neurosurgery    SUBJECTIVE:    Patient was seen today  No chest pain or shortness of breath  No Fevers or chills    12 point review of systems was otherwise negative besides what is mentioned above      Medications:    Current Facility-Administered Medications:     acetaminophen (TYLENOL) tablet 975 mg, 975 mg, Oral, Q8H [address], Anne Crews PA-C, 975 mg at 08/20/22 0608    albuterol (PROVENTIL HFA,VENTOLIN HFA) inhaler 2 puff, 2 puff, Inhalation, Q6H PRN, Shelbie Liu MD    ALPRAZolam Maci Robin) tablet 0 25 mg, 0 25 mg, Oral, BID PRN, Shelbie Liu MD    atorvastatin (LIPITOR) tablet 20 mg, 20 mg, Oral, Daily With Israel Adrian MD, 20 mg at 08/19/22 1736    b complex-vitamin C-folic acid (NEPHROCAPS) capsule 1 capsule, 1 capsule, Oral, Daily With Israel Adrian MD, 1 capsule at 08/19/22 1735    carvedilol (COREG) tablet 25 mg, 25 mg, Oral, BID, Adilia Simms MD, 25 mg at 08/20/22 9210    dicyclomine (BENTYL) capsule 10 mg, 10 mg, Oral, TID PRN, Shelbie Liu MD, 10 mg at 08/17/22 1753    insulin glargine (LANTUS) subcutaneous injection 8 Units 0 08 mL, 8 Units, Subcutaneous, HS, Shelbie Liu MD, 8 Units at 08/19/22 2227    insulin lispro (HumaLOG) 100 units/mL subcutaneous injection 1-5 Units, 1-5 Units, Subcutaneous, TID AC, 1 Units at 08/18/22 1843 **AND** Fingerstick Glucose (POCT), , , TID AC, Shelbie Liu MD    insulin lispro (HumaLOG) 100 units/mL subcutaneous injection 1-5 Units, 1-5 Units, Subcutaneous, HS, Shelbie Liu MD, 1 Units at 08/15/22 2129    insulin lispro (HumaLOG) 100 units/mL subcutaneous injection 3 Units, 3 Units, Subcutaneous, TID With Meals, Shelbie Liu MD, 3 Units at 08/19/22 1737    levothyroxine tablet 50 mcg, 50 mcg, Oral, Early Morning, Shelbie Liu MD, 50 mcg at 08/20/22 0608    lidocaine (LIDODERM) 5 % patch 1 patch, 1 patch, Topical, Daily, Shelbie Liu MD, 1 patch at 08/20/22 1337    loratadine (CLARITIN) tablet 10 mg, 10 mg, Oral, Daily, Shelbie Liu MD, 10 mg at 08/20/22 9568    melatonin tablet 3 mg, 3 mg, Oral, HS, Helen Sykes MD, 3 mg at 08/19/22 2227    menthol-methyl salicylate (BENGAY) 88-78 % cream, , Apply externally, 4x Daily PRN, Shalom Rosales PA-C, Given at 08/20/22 0844    methocarbamol (ROBAXIN) tablet 500 mg, 500 mg, Oral, Q6H [address], Anne Crews PA-C, 500 mg at 08/20/22 0608    metoclopramide (REGLAN) injection 5 mg, 5 mg, Intravenous, Q6H PRN, Perla Tidwell PA-C, 5 mg at 08/20/22 0913    nystatin (MYCOSTATIN) powder, , Topical, BID, Helen Sykes MD, Given at 08/19/22 9505    oxyCODONE (ROXICODONE) immediate release tablet 10 mg, 10 mg, Oral, Q4H PRN, Cam Bridges PA-C, 10 mg at 08/20/22 0840    oxyCODONE (ROXICODONE) IR tablet 5 mg, 5 mg, Oral, Q4H PRN, Cam Bridges PA-C    pantoprazole (PROTONIX) EC tablet 40 mg, 40 mg, Oral, Early Morning, Helen Sykes MD, 40 mg at 08/20/22 0608    polyethylene glycol (MIRALAX) packet 17 g, 17 g, Oral, Daily, Helen Sykes MD, 17 g at 08/19/22 0815    pregabalin (LYRICA) capsule 50 mg, 50 mg, Oral, Daily, Helen Sykes MD, 50 mg at 08/20/22 4359    senna (SENOKOT) tablet 17 2 mg, 2 tablet, Oral, HS PRN, Helen Sykes MD    [facility]) tablet 8 6 mg, 1 tablet, Oral, Daily, Helen Sykes MD, 8 6 mg at 08/19/22 0815    sertraline (ZOLOFT) tablet 75 mg, 75 mg, Oral, Daily, Helen Sykes MD, 75 mg at 08/20/22 3840    sevelamer (RENAGEL) tablet 1,600 mg, 1,600 mg, Oral, TID With Meals, Helen Sykes MD, 1,600 mg at 08/19/22 1735    simethicone (MYLICON) chewable tablet 80 mg, 80 mg, Oral, Q6H PRN, Ferdinand Cardona DO, 80 mg at 08/17/22 1155    warfarin (COUMADIN) tablet 10 mg, 10 mg, Oral, Daily (warfarin), Perla Tidwell PA-C, 10 mg at 08/19/22 1735    OBJECTIVE:    Vitals:    08/20/22 0947 08/20/22 1000 08/20/22 1010 08/20/22 1030   BP: (!) 148/122 100/70 95/73 98/63   BP Location: Right arm      Pulse: 77 79 79 (!) 2   Resp: 16 16 16 16   Temp: (!) 97 4 °F (36 3 °C)      TempSrc: Oral SpO2:       Weight:            Temp:  [97 4 °F (36 3 °C)-98 6 °F (37 °C)] 97 4 °F (36 3 °C)  HR:  [2-79] 2  Resp:  [16] 16  BP: ()/() 98/63     Body mass index is 45 19 kg/m²  Weight (last 2 days)     None          I/O last 3 completed shifts: In: 12 [P O :960]  Out: -     I/O this shift:  In: 200 [I V :200]  Out: -       Physical exam:    General: no acute distress, cooperative  Eyes: conjunctivae pink, anicteric sclerae  ENT: lips and mucous membranes moist, no exudates, normal external ears  Neck: ROM intact, no JVD  Chest: No respiratory distress, no accessory muscle use  CVS: normal rate, non pericardial friction rub  Abdomen: soft, non-tender, non-distended, normoactive bowel sounds  Extremities: no edema of both legs  Skin: no rash  Neuro: awake, alert, oriented, grossly intact  Psych:  Pleasant affect    Invasive Devices:      Lab Results:   Results from last 7 days   Lab Units 08/20/22  0944 08/18/22  0950 08/16/22  0447   WBC Thousand/uL 6 11  --  7 84   HEMOGLOBIN g/dL 8 2*  --  8 0*   HEMATOCRIT % 26 7*  --  25 9*   PLATELETS Thousands/uL 205  --  197   POTASSIUM mmol/L 4 4 4 5 4 7   CHLORIDE mmol/L 101 97 100   CO2 mmol/L 27 27 29   BUN mg/dL 32* 31* 55*   CREATININE mg/dL 8 21* 8 27* 10 60*   CALCIUM mg/dL 9 0 8 9 8 7         Portions of the record may have been created with voice recognition software  Occasional wrong word or "sound a like" substitutions may have occurred due to the inherent limitations of voice recognition software  Read the chart carefully and recognize, using context, where substitutions have occurred  If you have any questions, please contact the dictating provider

## 2022-08-20 NOTE — ASSESSMENT & PLAN NOTE
Lab Results   Component Value Date    HGBA1C 9 4 (H) 07/09/2022       Recent Labs     08/19/22  1941 08/19/22  2227 08/20/22  0646 08/20/22  1359   POCGLU 57* 194* 79 79       Blood Sugar Average: Last 72 hrs:  (P) 352 [id number]     · Continue with Lantus and sliding scale - blood sugars running low, will decrease lantus  · Monitor blood sugar  · Patient with poor p o  Intake, SO reports that when she eats she gets an upset stomach/vomits frequently    Recommend that she have GI evaluation for gastric emptying study as an outpt to evaluate for gastroparesis - she was recommended this by GI at EGD 11/2021

## 2022-08-20 NOTE — ASSESSMENT & PLAN NOTE
Lab Results   Component Value Date    EGFR 4 08/20/2022    EGFR 4 08/18/2022    EGFR 3 08/16/2022    CREATININE 8 21 (H) 08/20/2022    CREATININE 8 27 (H) 08/18/2022    CREATININE 10 60 (H) 08/16/2022     · Patient gets dialysis on Tuesday Thursday Saturday      · Continue with Nephrocaps and sensipar and Renagel  · Nephrology following, appreciate their ongoing recs

## 2022-08-20 NOTE — ASSESSMENT & PLAN NOTE
· Per record review, patient came to the hospital after 2 falls at home with right-sided hip/back pain that radiates down her right leg    She was recently in hospital with complaint of similar pain   · No acute osseous abnormality on CT scan RLE  · Pain control   · Continue scheduled Tylenol  · Continue oxy PRN  · IV Dilaudid was discontinued prior and would not resume at this time - no indication for IV narcotics  · Continue with lyrica, lidoderm patch  · Aqua K  · On Robaxin 500 mg q6h  · PT/OT recommending rehab at this time, CM following - patient deciding if she would rather go home with [facility]

## 2022-08-21 VITALS
WEIGHT: 280 LBS | OXYGEN SATURATION: 94 % | SYSTOLIC BLOOD PRESSURE: 138 MMHG | BODY MASS INDEX: 45.19 KG/M2 | RESPIRATION RATE: 16 BRPM | TEMPERATURE: 98 F | DIASTOLIC BLOOD PRESSURE: 45 MMHG | HEART RATE: 75 BPM

## 2022-08-21 LAB
GLUCOSE SERPL-MCNC: 126 MG/DL (ref 65–140)
GLUCOSE SERPL-MCNC: 192 MG/DL (ref 65–140)
INR PPP: 3.75 (ref 0.84–1.19)
PROTHROMBIN TIME: 37.3 SECONDS (ref 11.6–14.5)

## 2022-08-21 PROCEDURE — 85610 PROTHROMBIN TIME: CPT | Performed by: PHYSICIAN ASSISTANT

## 2022-08-21 PROCEDURE — 82948 REAGENT STRIP/BLOOD GLUCOSE: CPT

## 2022-08-21 PROCEDURE — 99239 HOSP IP/OBS DSCHRG MGMT >30: CPT | Performed by: PHYSICIAN ASSISTANT

## 2022-08-21 PROCEDURE — 99232 SBSQ HOSP IP/OBS MODERATE 35: CPT | Performed by: INTERNAL MEDICINE

## 2022-08-21 RX ORDER — OXYCODONE HYDROCHLORIDE 5 MG/1
2.5 TABLET ORAL EVERY 6 HOURS PRN
Qty: 10 TABLET | Refills: 0 | Status: SHIPPED | OUTPATIENT
Start: 2022-08-21 | End: 2022-09-07

## 2022-08-21 RX ORDER — ACETAMINOPHEN 325 MG/1
975 TABLET ORAL EVERY 8 HOURS SCHEDULED
Refills: 0
Start: 2022-08-21

## 2022-08-21 RX ORDER — METHOCARBAMOL 500 MG/1
500 TABLET, FILM COATED ORAL EVERY 6 HOURS SCHEDULED
Qty: 60 TABLET | Refills: 0 | Status: SHIPPED | OUTPATIENT
Start: 2022-08-21 | End: 2022-09-07

## 2022-08-21 RX ORDER — POLYETHYLENE GLYCOL 3350 17 G/17G
17 POWDER, FOR SOLUTION ORAL DAILY
Refills: 0
Start: 2022-08-21

## 2022-08-21 RX ORDER — MUSCLE RUB CREAM 100; 150 MG/G; MG/G
CREAM TOPICAL 4 TIMES DAILY PRN
Refills: 0
Start: 2022-08-21

## 2022-08-21 RX ADMIN — METHOCARBAMOL 500 MG: 500 TABLET ORAL at 06:17

## 2022-08-21 RX ADMIN — LEVOTHYROXINE SODIUM 50 MCG: 75 TABLET ORAL at 06:17

## 2022-08-21 RX ADMIN — LORATADINE 10 MG: 10 TABLET ORAL at 07:32

## 2022-08-21 RX ADMIN — CARVEDILOL 25 MG: 25 TABLET, FILM COATED ORAL at 07:32

## 2022-08-21 RX ADMIN — OXYCODONE HYDROCHLORIDE 10 MG: 10 TABLET ORAL at 06:17

## 2022-08-21 RX ADMIN — ALPRAZOLAM 0.25 MG: 0.25 TABLET ORAL at 07:41

## 2022-08-21 RX ADMIN — POLYETHYLENE GLYCOL 3350 17 G: 17 POWDER, FOR SOLUTION ORAL at 07:33

## 2022-08-21 RX ADMIN — ACETAMINOPHEN 975 MG: 325 TABLET ORAL at 06:17

## 2022-08-21 RX ADMIN — SENNOSIDES 8.6 MG: 8.6 TABLET, FILM COATED ORAL at 07:33

## 2022-08-21 RX ADMIN — SERTRALINE HYDROCHLORIDE 75 MG: 50 TABLET ORAL at 07:32

## 2022-08-21 RX ADMIN — SEVELAMER HYDROCHLORIDE 1600 MG: 800 TABLET ORAL at 11:42

## 2022-08-21 RX ADMIN — NYSTATIN: 100000 POWDER TOPICAL at 07:45

## 2022-08-21 RX ADMIN — LIDOCAINE 5% 1 PATCH: 700 PATCH TOPICAL at 07:33

## 2022-08-21 RX ADMIN — PANTOPRAZOLE SODIUM 40 MG: 40 TABLET, DELAYED RELEASE ORAL at 06:17

## 2022-08-21 RX ADMIN — INSULIN LISPRO 3 UNITS: 100 INJECTION, SOLUTION INTRAVENOUS; SUBCUTANEOUS at 11:42

## 2022-08-21 RX ADMIN — METHOCARBAMOL 500 MG: 500 TABLET ORAL at 11:42

## 2022-08-21 RX ADMIN — PREGABALIN 50 MG: 50 CAPSULE ORAL at 07:33

## 2022-08-21 RX ADMIN — INSULIN LISPRO 1 UNITS: 100 INJECTION, SOLUTION INTRAVENOUS; SUBCUTANEOUS at 06:22

## 2022-08-21 RX ADMIN — OXYCODONE HYDROCHLORIDE 10 MG: 10 TABLET ORAL at 10:20

## 2022-08-21 NOTE — PLAN OF CARE
Problem: MOBILITY - ADULT  Goal: Maintain or return to baseline ADL function  Description: INTERVENTIONS:  -  Assess patient's ability to carry out ADLs; assess patient's baseline for ADL function and identify physical deficits which impact ability to perform ADLs (bathing, care of mouth/teeth, toileting, grooming, dressing, etc )  - Assess/evaluate cause of self-care deficits   - Assess range of motion  - Assess patient's mobility; develop plan if impaired  - Assess patient's need for assistive devices and provide as appropriate  - Encourage maximum independence but intervene and supervise when necessary  - Involve family in performance of ADLs  - Assess for home care needs following discharge   - Consider OT consult to assist with ADL evaluation and planning for discharge  - Provide patient education as appropriate  Outcome: Adequate for Discharge  Goal: Maintains/Returns to pre admission functional level  Description: INTERVENTIONS:  - Perform BMAT or MOVE assessment daily    - Set and communicate daily mobility goal to care team and patient/family/caregiver  - Collaborate with rehabilitation services on mobility goals if consulted  - Perform Range of Motion  times a day  - Reposition patient every  hours    - Dangle patient  times a day  - Stand patient  times a day  - Ambulate patient  times a day  - Out of bed to chair  times a day   - Out of bed for meals  times a day  - Out of bed for toileting  - Record patient progress and toleration of activity level   Outcome: Adequate for Discharge     Problem: PAIN - ADULT  Goal: Verbalizes/displays adequate comfort level or baseline comfort level  Description: Interventions:  - Encourage patient to monitor pain and request assistance  - Assess pain using appropriate pain scale  - Administer analgesics based on type and severity of pain and evaluate response  - Implement non-pharmacological measures as appropriate and evaluate response  - Consider cultural and social influences on pain and pain management  - Notify physician/advanced practitioner if interventions unsuccessful or patient reports new pain  Outcome: Adequate for Discharge     Problem: INFECTION - ADULT  Goal: Absence or prevention of progression during hospitalization  Description: INTERVENTIONS:  - Assess and monitor for signs and symptoms of infection  - Monitor lab/diagnostic results  - Monitor all insertion sites, i e  indwelling lines, tubes, and drains  - Monitor endotracheal if appropriate and nasal secretions for changes in amount and color  - [place] appropriate cooling/warming therapies per order  - Administer medications as ordered  - Instruct and encourage patient and family to use good hand hygiene technique  - Identify and instruct in appropriate isolation precautions for identified infection/condition  Outcome: Adequate for Discharge  Goal: Absence of fever/infection during neutropenic period  Description: INTERVENTIONS:  - Monitor WBC    Outcome: Adequate for Discharge     Problem: SAFETY ADULT  Goal: Maintain or return to baseline ADL function  Description: INTERVENTIONS:  -  Assess patient's ability to carry out ADLs; assess patient's baseline for ADL function and identify physical deficits which impact ability to perform ADLs (bathing, care of mouth/teeth, toileting, grooming, dressing, etc )  - Assess/evaluate cause of self-care deficits   - Assess range of motion  - Assess patient's mobility; develop plan if impaired  - Assess patient's need for assistive devices and provide as appropriate  - Encourage maximum independence but intervene and supervise when necessary  - Involve family in performance of ADLs  - Assess for home care needs following discharge   - Consider OT consult to assist with ADL evaluation and planning for discharge  - Provide patient education as appropriate  Outcome: Adequate for Discharge  Goal: Maintains/Returns to pre admission functional level  Description: INTERVENTIONS:  - Perform BMAT or MOVE assessment daily    - Set and communicate daily mobility goal to care team and patient/family/caregiver  - Collaborate with rehabilitation services on mobility goals if consulted  - Perform Range of Motion  times a day  - Reposition patient every  hours  - Dangle patient  times a day  - Stand patient  times a day  - Ambulate patient  times a day  - Out of bed to chair  times a day   - Out of bed for meals  times a day  - Out of bed for toileting  - Record patient progress and toleration of activity level   Outcome: Adequate for Discharge  Goal: Patient will remain free of falls  Description: INTERVENTIONS:  - Educate patient/family on patient safety including physical limitations  - Instruct patient to call for assistance with activity   - Consult OT/PT to assist with strengthening/mobility   - Keep Call bell within reach  - Keep bed low and locked with side rails adjusted as appropriate  - Keep care items and personal belongings within reach  - Initiate and maintain comfort rounds  - Make Fall Risk Sign visible to staff  - Offer Toileting every  Hours, in advance of need  - Initiate/Maintain alarm  - Obtain necessary fall risk management equipment:  - Apply yellow socks and bracelet for high fall risk patients  - Consider moving patient to room near nurses station  Outcome: Adequate for Discharge     Problem: Knowledge Deficit  Goal: Patient/family/caregiver demonstrates understanding of disease process, treatment plan, medications, and discharge instructions  Description: Complete learning assessment and assess knowledge base    Interventions:  - Provide teaching at level of understanding  - Provide teaching via preferred learning methods  Outcome: Adequate for Discharge     Problem: Potential for Falls  Goal: Patient will remain free of falls  Description: INTERVENTIONS:  - Educate patient/family on patient safety including physical limitations  - Instruct patient to call for assistance with activity   - Consult OT/PT to assist with strengthening/mobility   - Keep Call bell within reach  - Keep bed low and locked with side rails adjusted as appropriate  - Keep care items and personal belongings within reach  - Initiate and maintain comfort rounds  - Make Fall Risk Sign visible to staff  - Offer Toileting every  Hours, in advance of need  - Initiate/Maintain alarm  - Obtain necessary fall risk management equipment:   - Apply yellow socks and bracelet for high fall risk patients  - Consider moving patient to room near nurses station  Outcome: Adequate for Discharge

## 2022-08-21 NOTE — ASSESSMENT & PLAN NOTE
Lab Results   Component Value Date    HGBA1C 9 4 (H) 07/09/2022       Recent Labs     08/20/22  1615 08/20/22  2112 08/20/22  2322 08/21/22  0612   POCGLU 129 72 91 192*       Blood Sugar Average: Last 72 hrs:  (P) 118 4     · Continue with Lantus and sliding scale - blood sugars running low, will decrease lantus  · Monitor blood sugar  · Patient with poor p o  Intake, SO reports that when she eats she gets an upset stomach/vomits frequently    Recommend that she have GI evaluation for gastric emptying study as an outpt to evaluate for gastroparesis - she was recommended this by GI at EGD 11/2021

## 2022-08-21 NOTE — DISCHARGE SUMMARY
[address] [facility]  Discharge- Lynda Platt 1965, 56 y o  female MRN: [id number]  Unit/Bed#: -01 Encounter: [id number]  Primary Care Provider: Marissa Degroot MD   Date and time admitted to hospital: 8/12/2022 11:43 AM    * [address]  · Per record review, patient came to the hospital after 2 falls at home with right-sided hip/back pain that radiates down her right leg  She was recently in hospital with complaint of similar pain   · No acute osseous abnormality on CT scan RLE  · Pain control   · Continue scheduled Tylenol  · Continue oxy PRN  · IV Dilaudid was discontinued prior and would not resume at this time - no indication for IV narcotics  · Continue with lyrica, lidoderm patch  · Aqua K  · On Robaxin 500 mg q6h  · PT/OT recommending rehab at this time, CM following - patient  would rather go home with VNA    Concern for Osteomyelitis/Discitis of lumbar region  Assessment & Plan  · Patient came to the hospital recently with back pain and was evaluated by Neurosurgery and Infectious Disease  · No plan for any biopsy or surgical intervention    ESRD on hemodialysis  Assessment & Plan  Lab Results   Component Value Date    EGFR 4 08/20/2022    EGFR 4 08/18/2022    EGFR 3 08/16/2022    CREATININE 8 21 (H) 08/20/2022    CREATININE 8 27 (H) 08/18/2022    CREATININE 10 60 (H) 08/16/2022     · Patient gets dialysis on Tuesday Thursday Saturday  · Continue with Nephrocaps and sensipar and Renagel  · Nephrology following, appreciate their ongoing recs    Morbid obesity  Assessment & Plan  Body mass index is 45 19 kg/m²    · Encourage lifestyle modification, weight loss     Type 2 diabetes mellitus [facility])  Assessment & Plan  Lab Results   Component Value Date    HGBA1C 9 4 (H) 07/09/2022       Recent Labs     08/20/22  1615 08/20/22  2112 08/20/22  2322 08/21/22  0612   POCGLU 129 72 91 192*       Blood Sugar Average: Last 72 hrs:  (P) 118 4     · Continue with Lantus and sliding scale - blood sugars running low, will decrease lantus  · Monitor blood sugar  · Patient with poor p o  Intake, SO reports that when she eats she gets an upset stomach/vomits frequently  Recommend that she have GI evaluation for gastric emptying study as an outpt to evaluate for gastroparesis - she was recommended this by GI at EGD 11/2021     Pulmonary embolus [facility])  Assessment & Plan  · Patient with previous history of PE  · Questionable compliance  · Hold coumadin   · INR  Supra therapeutic at 3 75, will  Hold for another night, then start at a lower dose    Chronic pain syndrome  Assessment & Plan  · Followed by pain management as outpatient  · Appears she was briefly on short course of Percocet but does not appear to have chornic narcotics  · Seen by APS on recent admission  · Pain regimen as above    Chronic anticoagulation  Assessment & Plan  · Patient is on Coumadin as outpatient  · Hold coumadin, adjust based on INR  Monitor     Essential hypertension  Assessment & Plan  · BP low normal  · Renal following, input appreciated  · Currently on Coreg 25 mg BID  · Disconitnued Nifedipine and Demadex    Anemia of chronic disease  Assessment & Plan  · Hemoglobin at baseline  · Monitor    Nausea and vomiting  Assessment & Plan  · Patient reports ongoing N/V  · Was recommended to have gastric emptying study after EGD in November of 2021, this was not done   · Recommend she be evaluated for suspected gastroparesis, recommend GI referral on d/c   · PRN antiemetic  · KUB revealed unremarkable abdomen  · Patient reports having large BM 2 days ago    Anxiety disorder  Assessment & Plan  · Continue with p r n   Xanax        Medical Problems             Resolved Problems  Date Reviewed: 8/21/2022          Resolved    Cellulitis 8/16/2022     Resolved by  Avni Hopson PA-C              Discharging Physician / Practitioner: Ha Palomo PA-C  PCP: Marlo Maurer MD  Admission Date:   Admission Orders (From admission, onward)     Ordered        08/13/22 1404  Inpatient Admission  Once            08/12/22 1752  Place in Observation  Once                      Discharge Date: 08/21/22    Consultations During Hospital Stay:  · Dr Bella Mcneil    Procedures Performed:     KUB  Unremarkable abdomen  CT right lower extremity  1  No acute osseous abnormality  2   Mild degenerative changes the right hip and lumbar spine  3   Mild circumferential bladder wall thickening may represent cystitis versus underdistention  Clinical and laboratory correlation are recommended  Significant Findings / Test Results:   · See above    Incidental Findings:   · none     Test Results Pending at Discharge (will require follow up):   · none     Outpatient Tests Requested:  · none    Complications:  none    Reason for Admission: falls    Hospital Course:   Nelly Monahan is a 56 y o  female patient who originally presented to the hospital on 8/12/2022 due to frequent falls  Most recent fall resulting in right sided back pain radiating to the knee  No abnormality noted on CT scan  PT saw the patient and recommended rehab  She was denied at acute rehab, so she decided that she would rather go home with VNA PT/OT  She has family support  During her stay, she had nausea and vomiting  It resolved  She should follow up with GI as an outpatient for a gastroparesis workup  Her blood pressures were running on the low side  Nephrology discontinued her nifedipine and Demadex  Will keep those on hold at discharge and they will re-evaluate on the outpatient setting  Patient will be discharged home in stable condition  Please see above list of diagnoses and related plan for additional information       Condition at Discharge: good    Discharge Day Visit / Exam:   Subjective:  Still with some right-sided back pain radiating down to her knee  Vitals: Blood Pressure: (!) 138/45 (08/21/22 0715)  Pulse: 75 (08/20/22 2330)  Temperature: 98 °F (36 7 °C) (08/21/22 0715)  Temp Source: Oral (08/20/22 2330)  Respirations: 16 (08/20/22 1307)  Weight - Scale: 127 kg (280 lb) (08/12/22 1147)  SpO2: 94 % (08/20/22 2330)  Exam:   Physical Exam  Constitutional:       Appearance: Normal appearance  She is obese  Cardiovascular:      Rate and Rhythm: Normal rate and regular rhythm  Heart sounds: No murmur heard  Pulmonary:      Effort: Pulmonary effort is normal       Breath sounds: Normal breath sounds  Abdominal:      General: Bowel sounds are normal  There is no distension  Palpations: Abdomen is soft  Tenderness: There is no abdominal tenderness  Skin:     General: Skin is warm and dry  Neurological:      General: No focal deficit present  Mental Status: She is alert and oriented to person, place, and time  Psychiatric:         Mood and Affect: Mood normal          Discussion with Family: Patient declined call to   Discharge instructions/Information to patient and family:   See after visit summary for information provided to patient and family  Provisions for Follow-Up Care:  See after visit summary for information related to follow-up care and any pertinent home health orders  Disposition:   Home with VNA Services (Reminder: Complete face to face encounter)    Planned Readmission: patient at high risk for readmission since she is declining inpatient rehab     Discharge Statement:  I spent 40 minutes discharging the patient  This time was spent on the day of discharge  I had direct contact with the patient on the day of discharge  Greater than 50% of the total time was spent examining patient, answering all patient questions, arranging and discussing plan of care with patient as well as directly providing post-discharge instructions  Additional time then spent on discharge activities  Discharge Medications:  See after visit summary for reconciled discharge medications provided to patient and/or family  **Please Note: This note may have been constructed using a voice recognition system**

## 2022-08-21 NOTE — PROGRESS NOTES
NEPHROLOGY PROGRESS NOTE   Raine Babb 56 y o  female MRN: [id number]  Unit/Bed#: -01 Encounter: [id number]        ASSESSMENT & PLAN    56Year old female with a history of end-stage kidney disease on hemodialysis Tuesday Thursday Saturday presented status post fall and back pain     1  End-stage kidney disease on hemodialysis  ? Tuesday Thursday Saturday at [address]  ? Dry weight of 123 kg-she did get down to 120 5 kg however her blood pressure has been low, with low blood pressure she did tolerate 2L fluid removed yesterday  ? Using left AV fistula  ? Her blood pressures are better today  ? Next dialysis on Tuesday                2  Volume overload  ? Clinically with some lower extremity edema but overall stable respiratory status will monitor     3  CKD bone mineral disease  ? History of hypercalcemia so was on Sensipar but most recent calcium was 6 8 so this was held and now calcium is improving  ? Hyperphosphatemia on Renagel with phosphorus level of 6 3 will continue to monitor                4  Anemia in the setting of end-stage kidney disease  ? Current hemoglobin is low she has a history of PEs and is not on an MITCHELL monitor to see if it drops below 7     5  Acute encephalopathy  ? Component of polypharmacy  ? Blood pressures on the lower side will monitor     6  Back pain  ? Recent MRI showing L4-L5 osteomyelitis with diskitis  ? Blood cultures have been negative  ? Was seen by infectious disease and neurosurgery  ? Continue pain management       SUBJECTIVE:    Patient was seen today  Overall with no acute complaints  Tolerated fluid removal yesterday although blood pressure was on the lower side  Complaining of some pain this morning    12 point review of systems was otherwise negative besides what is mentioned above      Medications:    Current Facility-Administered Medications:     acetaminophen (TYLENOL) tablet 975 mg, 975 mg, Oral, Q8H [address], Anne Crews PA-C, 975 mg at 08/21/22 0617   albuterol (PROVENTIL HFA,VENTOLIN HFA) inhaler 2 puff, 2 puff, Inhalation, Q6H PRN, Jennifer Phipps MD    ALPRAZolam Gino Servin) tablet 0 25 mg, 0 25 mg, Oral, BID PRN, Jennifer Phipps MD, 0 25 mg at 08/21/22 0741    atorvastatin (LIPITOR) tablet 20 mg, 20 mg, Oral, Daily With Aaron Alexander MD, 20 mg at 08/20/22 1819    b complex-vitamin C-folic acid (NEPHROCAPS) capsule 1 capsule, 1 capsule, Oral, Daily With Aaron Alexander MD, 1 capsule at 08/20/22 1819    carvedilol (COREG) tablet 25 mg, 25 mg, Oral, BID, Booker Ellis MD, 25 mg at 08/21/22 0732    dicyclomine (BENTYL) capsule 10 mg, 10 mg, Oral, TID PRN, Jennifer Phipps MD, 10 mg at 08/20/22 1819    insulin glargine (LANTUS) subcutaneous injection 4 Units 0 04 mL, 4 Units, Subcutaneous, HS, Vanessa Veliz PA-C, 4 Units at 08/20/22 2323    insulin lispro (HumaLOG) 100 units/mL subcutaneous injection 1-5 Units, 1-5 Units, Subcutaneous, TID AC, 1 Units at 08/21/22 0622 **AND** Fingerstick Glucose (POCT), , , TID AC, Jennifer Phipps MD    insulin lispro (HumaLOG) 100 units/mL subcutaneous injection 1-5 Units, 1-5 Units, Subcutaneous, HS, Jennifer Phipps MD, 1 Units at 08/15/22 2129    insulin lispro (HumaLOG) 100 units/mL subcutaneous injection 3 Units, 3 Units, Subcutaneous, TID With Meals, Jennifer Phipps MD, 3 Units at 08/20/22 2749    levothyroxine tablet 50 mcg, 50 mcg, Oral, Early Morning, Jennifer Phipps MD, 50 mcg at 08/21/22 0617    lidocaine (LIDODERM) 5 % patch 1 patch, 1 patch, Topical, Daily, Jennifer Phipps MD, 1 patch at 08/21/22 7864    loratadine (CLARITIN) tablet 10 mg, 10 mg, Oral, Daily, Jennifer Phipps MD, 10 mg at 08/21/22 0732    melatonin tablet 3 mg, 3 mg, Oral, HS, Jennifer Phipps MD, 3 mg at 08/20/22 2322    menthol-methyl salicylate (BENGAY) 17-20 % cream, , Apply externally, 4x Daily PRN, Jonas Rosales PA-C, Given at 08/20/22 0844    methocarbamol (ROBAXIN) tablet 500 mg, 500 mg, Oral, Q6H [address], Alina Srinivasan, LILLIANA, 500 mg at 08/21/22 0617    metoclopramide (REGLAN) injection 5 mg, 5 mg, Intravenous, Q6H PRN, Perla Tidwell PA-C, 5 mg at 08/20/22 0913    nystatin (MYCOSTATIN) powder, , Topical, BID, Kayleigh Winslow MD, Given at 08/21/22 0745    oxyCODONE (ROXICODONE) immediate release tablet 10 mg, 10 mg, Oral, Q4H PRN, Jony Cox PA-C, 10 mg at 08/21/22 0617    oxyCODONE (ROXICODONE) IR tablet 5 mg, 5 mg, Oral, Q4H PRN, Jony Cox PA-C, 5 mg at 08/20/22 2315    pantoprazole (PROTONIX) EC tablet 40 mg, 40 mg, Oral, Early Morning, Kayleigh Winslow MD, 40 mg at 08/21/22 0617    polyethylene glycol (MIRALAX) packet 17 g, 17 g, Oral, Daily, Kayleigh Winslow MD, 17 g at 08/21/22 7426    pregabalin (LYRICA) capsule 50 mg, 50 mg, Oral, Daily, Kayleigh Winslow MD, 50 mg at 08/21/22 3301    senna (SENOKOT) tablet 17 2 mg, 2 tablet, Oral, HS PRN, Kayleigh Winslow MD    [facility]) tablet 8 6 mg, 1 tablet, Oral, Daily, Kayleigh Winslow MD, 8 6 mg at 08/21/22 7477    sertraline (ZOLOFT) tablet 75 mg, 75 mg, Oral, Daily, Kayleigh Winslow MD, 75 mg at 08/21/22 0732    sevelamer (RENAGEL) tablet 1,600 mg, 1,600 mg, Oral, TID With Meals, Kayleigh Winslow MD, 1,600 mg at 08/20/22 1819    simethicone (MYLICON) chewable tablet 80 mg, 80 mg, Oral, Q6H PRN, Ferdinand Cardona DO, 80 mg at 08/17/22 1155    OBJECTIVE:    Vitals:    08/20/22 1307 08/20/22 1827 08/20/22 2330 08/21/22 0715   BP: 101/65 (!) 131/46 126/59 (!) 138/45   BP Location:   Right arm    Pulse: 76  75    Resp: 16      Temp:   97 6 °F (36 4 °C) 98 °F (36 7 °C)   TempSrc:   Oral    SpO2:   94%    Weight:            Temp:  [97 4 °F (36 3 °C)-98 4 °F (36 9 °C)] 98 °F (36 7 °C)  HR:  [2-79] 75  Resp:  [16] 16  BP: ()/() 138/45  SpO2:  [94 %] 94 %     Body mass index is 45 19 kg/m²  Weight (last 2 days)     None          I/O last 3 completed shifts: In: 860 [P O :360; I V :500]  Out: 0     No intake/output data recorded        Physical exam:    General: no acute distress, cooperative  Eyes: conjunctivae pink, anicteric sclerae  ENT: lips and mucous membranes moist, no exudates, normal external ears  Neck: ROM intact, no JVD  Chest: No respiratory distress, no accessory muscle use  CVS: normal rate, non pericardial friction rub  Abdomen: soft, non-tender, non-distended, normoactive bowel sounds  Extremities: no edema of both legs  Skin: no rash  Neuro: awake, alert, oriented, grossly intact  Psych:  Pleasant affect    Invasive Devices:      Lab Results:   Results from last 7 days   Lab Units 08/20/22  0944 08/18/22  0950 08/16/22  0447   WBC Thousand/uL 6 11  --  7 84   HEMOGLOBIN g/dL 8 2*  --  8 0*   HEMATOCRIT % 26 7*  --  25 9*   PLATELETS Thousands/uL 205  --  197   POTASSIUM mmol/L 4 4 4 5 4 7   CHLORIDE mmol/L 101 97 100   CO2 mmol/L 27 27 29   BUN mg/dL 32* 31* 55*   CREATININE mg/dL 8 21* 8 27* 10 60*   CALCIUM mg/dL 9 0 8 9 8 7         Portions of the record may have been created with voice recognition software  Occasional wrong word or "sound a like" substitutions may have occurred due to the inherent limitations of voice recognition software  Read the chart carefully and recognize, using context, where substitutions have occurred  If you have any questions, please contact the dictating provider

## 2022-08-21 NOTE — ASSESSMENT & PLAN NOTE
· Per record review, patient came to the hospital after 2 falls at home with right-sided hip/back pain that radiates down her right leg    She was recently in hospital with complaint of similar pain   · No acute osseous abnormality on CT scan RLE  · Pain control   · Continue scheduled Tylenol  · Continue oxy PRN  · IV Dilaudid was discontinued prior and would not resume at this time - no indication for IV narcotics  · Continue with lyrica, lidoderm patch  · Aqua K  · On Robaxin 500 mg q6h  · PT/OT recommending rehab at this time, CM following - patient  would rather go home with VNA

## 2022-08-21 NOTE — DISCHARGE INSTRUCTIONS
Do not take your Coumadin tonight  You can resume it tomorrow (Monday)    Do not take Sensipar, nifedipine, or torsemide until further instructed by your Nephrologist

## 2022-08-21 NOTE — ASSESSMENT & PLAN NOTE
· BP low normal  · Renal following, input appreciated  · Currently on Coreg 25 mg BID  · Disconitnued Nifedipine and Demadex

## 2022-08-21 NOTE — ASSESSMENT & PLAN NOTE
· Patient with previous history of PE  · Questionable compliance  · Hold coumadin   · INR  Supra therapeutic at 3 75, will  Hold for another night, then start at a lower dose

## 2022-08-22 ENCOUNTER — HOME HEALTH ADMISSION (OUTPATIENT)
Dept: HOME HEALTH SERVICES | Facility: HOME HEALTHCARE | Age: 57
End: 2022-08-22
Payer: MEDICARE

## 2022-08-23 ENCOUNTER — HOME CARE VISIT (OUTPATIENT)
Dept: HOME HEALTH SERVICES | Facility: HOME HEALTHCARE | Age: 57
End: 2022-08-23
Payer: MEDICARE

## 2022-08-23 VITALS
SYSTOLIC BLOOD PRESSURE: 142 MMHG | HEIGHT: 67 IN | HEART RATE: 88 BPM | TEMPERATURE: 95.5 F | DIASTOLIC BLOOD PRESSURE: 88 MMHG | OXYGEN SATURATION: 98 % | BODY MASS INDEX: 43.85 KG/M2

## 2022-08-23 LAB
MRSA NOSE QL CULT: ABNORMAL
MRSA NOSE QL CULT: ABNORMAL

## 2022-08-23 PROCEDURE — G0151 HHCP-SERV OF PT,EA 15 MIN: HCPCS

## 2022-08-23 PROCEDURE — 10330081 VN NO-PAY CLAIM PROCEDURE

## 2022-08-23 PROCEDURE — 400013 VN SOC

## 2022-08-23 NOTE — CASE COMMUNICATION
[facility] has admitted your patient to [address] [place] [address] service with the following disciplines:      PT and OT  Request for MSW home health for pt inquiring about POA and living will paperwork    Response needed, please respond via EPIc communication and documentation concerning medication clarification listed below and request for home health MSW  Primary focus of home health care MUSCULOSKELETAL    Patient stated goals of care are  to dec the back and right leg pain,to have more energy, to be able to use the  and [facility] interchangeably like before the falls,to stop falling    Anticipated visit pattern [id number] AND [id number]    See medication list - meds in home differ from AVS     pt reports getting new pill packets with correct medications on Thursday 08/25/22  Pt is currently only taking Alprazolam 0 5 mg tab, not taking the  25 mg tab  Pt reports no longer taking Carve dilol, Pregabalin  Pt currently does not have the following medications in the home Ammonium Lactate, Melatonin, Ondansetron, Miralax, Urea  Major drug to drug medication interactions:  1) warfarin and levothyroxine  2) warfarin and menthol methyl salicylate  3) nystatin and warfarin    Moderate drug to drug medication interaction:  1) levothyroxine and carvedilol  2) levothyroxine and sertraline  3) oxycodone and alprazolam  4) l idocaine and carvedilol  5) levothyroxine and sevelamer  6) warfarin and pantoprazole  7) warfarin and sertraline  8) ondansetron and sertraline  9) oxycodone and sertraline  10) multi vitamin b complex, vit C-folic acid and warfarin  11) warfarin adn daily multivitamin  12) atorvastatin and multivitamin, b complex, vit c-folic acid  13) levothyroxine and Kionex  Significant clinical findings dec strength RLE in all planes of motion   at axic and unsteady gait pattern, dec WB RLE 2* inc pain and discomfort  Potential barriers to goal achievement many BARBARA, needs A at all times for mobility and ADLs from significant other and family  Other pertinent information lives in very busy household    Thank you for allowing us to participate in the care of your patient        Jyotsna Art DPT

## 2022-08-24 ENCOUNTER — HOME CARE VISIT (OUTPATIENT)
Dept: HOME HEALTH SERVICES | Facility: HOME HEALTHCARE | Age: 57
End: 2022-08-24
Payer: MEDICARE

## 2022-08-24 NOTE — CASE COMMUNICATION
Received a call from pt inquiring about a quad cane she reports was ordered prior to her hospital discharge  Upon review of CM notes this SN saw nothing documented regarding the same  Has the cane been ordered or is pt still in need of quad cane  Could someone please follow up regarding the same  She was expecting the cane to be delivered to her home  Thank you

## 2022-08-24 NOTE — Clinical Note
Neo Mercedes,    Just wanted to give you a heads up from case communication notes that were being sent to be from the A office  I responded to [address] communication with the message listed below  That is all I know, but giving you some insight and information so you are informed prior to seeing this patient  This is the return case communication note I sent Colletta Minor from the A office:    "I spoke with the CM from [facility] through tiger text about the information mentioned below  Per CM Early Arsh), equipment was not ordered due to no recommendations from therapy team at [facility] reguarding need for quad cane  During PT [facility] home visit, pt was explaining a hurricane style cane to me  I told her that this would be an out of pocket expense and insurance does not cover this item  Pt does own a SPC and a RW which pt does use for mobility currently  I ordered the pt a bedside commode via [employer] and this piece of equipment should be delivered in the near future (already received MD signature)  That is everything that I know from the PT [facility] home visit  Please forward or send all future notes and communication to the therapist who is assigned to this pt "    I hope this helps!!      Thank you,  Isidro Xiong  DPT  ----- Message -----  From: Rowe Dakin, RN  Sent: 8/24/2022   3:19 PM EDT  To: Rhys Arriaza PT      Received a call from pt inquiring about a quad cane she reports was ordered prior to her hospital discharge  Upon review of CM notes this SN saw nothing documented regarding the same  Has the cane been ordered or is pt still in need of quad cane  Could someone please follow up regarding the same  She was expecting the cane to be delivered to her home  Thank you

## 2022-08-24 NOTE — Clinical Note
I spoke with the CM from [facility] through tiger text about the information mentioned below  Per CM Armin Jacome), equipment was not ordered due to no recommendations from therapy team at [facility] reguarding need for quad cane  During PT [facility] home visit, pt was explaining a hurricane style cane to me  I told her that this would be an out of pocket expense and insurance does not cover this item  Pt does own a SPC and a RW which pt does use for mobility currently  I ordered the pt a bedside commode via [employer] and this piece of equipment should be delivered in the near future (already received MD signature)  That is everything that I know from the PT [facility] home visit  Please forward or send all future notes and communication to the therapist who is assigned to this pt  Thank you,  Paula Casiano  DPT  ----- Message -----  From: Stan Mack RN  Sent: 8/24/2022   3:19 PM EDT  To: Paula Casiano, PT      Received a call from pt inquiring about a quad cane she reports was ordered prior to her hospital discharge  Upon review of CM notes this SN saw nothing documented regarding the same  Has the cane been ordered or is pt still in need of quad cane  Could someone please follow up regarding the same  She was expecting the cane to be delivered to her home  Thank you

## 2022-08-26 ENCOUNTER — HOME CARE VISIT (OUTPATIENT)
Dept: HOME HEALTH SERVICES | Facility: HOME HEALTHCARE | Age: 57
End: 2022-08-26
Payer: MEDICARE

## 2022-08-26 PROCEDURE — G0151 HHCP-SERV OF PT,EA 15 MIN: HCPCS

## 2022-08-26 NOTE — CASE COMMUNICATION
Faxed Dr Norah Ponce regarding the delay in initiating OT within 7 days  Anticipated date to begin is 9/2/22 or sooner  OT to call the patient to schedule the visit

## 2022-08-29 ENCOUNTER — HOME CARE VISIT (OUTPATIENT)
Dept: HOME HEALTH SERVICES | Facility: HOME HEALTHCARE | Age: 57
End: 2022-08-29
Payer: MEDICARE

## 2022-08-29 VITALS — SYSTOLIC BLOOD PRESSURE: 150 MMHG | DIASTOLIC BLOOD PRESSURE: 80 MMHG

## 2022-08-29 PROCEDURE — G0152 HHCP-SERV OF OT,EA 15 MIN: HCPCS

## 2022-08-30 ENCOUNTER — APPOINTMENT (EMERGENCY)
Dept: RADIOLOGY | Facility: HOSPITAL | Age: 57
DRG: 070 | End: 2022-08-30
Payer: MEDICARE

## 2022-08-30 ENCOUNTER — HOSPITAL ENCOUNTER (INPATIENT)
Facility: HOSPITAL | Age: 57
LOS: 7 days | Discharge: HOME HEALTH | DRG: 070 | End: 2022-09-07
Attending: EMERGENCY MEDICINE | Admitting: INTERNAL MEDICINE
Payer: MEDICARE

## 2022-08-30 DIAGNOSIS — I95.3 HEMODIALYSIS-ASSOCIATED HYPOTENSION: ICD-10-CM

## 2022-08-30 DIAGNOSIS — N18.6 ESRD ON HEMODIALYSIS (HCC): Chronic | ICD-10-CM

## 2022-08-30 DIAGNOSIS — R77.8 ELEVATED TROPONIN: Primary | ICD-10-CM

## 2022-08-30 DIAGNOSIS — I10 ESSENTIAL HYPERTENSION: Chronic | ICD-10-CM

## 2022-08-30 DIAGNOSIS — E16.2 HYPOGLYCEMIA: ICD-10-CM

## 2022-08-30 DIAGNOSIS — G93.41 METABOLIC ENCEPHALOPATHY: ICD-10-CM

## 2022-08-30 DIAGNOSIS — Z86.718 HISTORY OF DVT (DEEP VEIN THROMBOSIS): ICD-10-CM

## 2022-08-30 DIAGNOSIS — M54.9 BACK PAIN: ICD-10-CM

## 2022-08-30 DIAGNOSIS — Z99.2 ESRD ON HEMODIALYSIS (HCC): Chronic | ICD-10-CM

## 2022-08-30 DIAGNOSIS — M54.16 LUMBAR RADICULOPATHY: ICD-10-CM

## 2022-08-30 PROBLEM — R41.89 DECREASED LEVEL OF CONSCIOUSNESS: Status: ACTIVE | Noted: 2022-08-30

## 2022-08-30 LAB
2HR DELTA HS TROPONIN: -10 NG/L
4HR DELTA HS TROPONIN: -14 NG/L
ALBUMIN SERPL BCP-MCNC: 3.1 G/DL (ref 3.5–5)
ALP SERPL-CCNC: 102 U/L (ref 46–116)
ALT SERPL W P-5'-P-CCNC: 17 U/L (ref 12–78)
ANION GAP SERPL CALCULATED.3IONS-SCNC: 7 MMOL/L (ref 4–13)
APTT PPP: 28 SECONDS (ref 23–37)
AST SERPL W P-5'-P-CCNC: 18 U/L (ref 5–45)
ATRIAL RATE: 69 BPM
BASOPHILS # BLD AUTO: 0.04 THOUSANDS/ΜL (ref 0–0.1)
BASOPHILS NFR BLD AUTO: 1 % (ref 0–1)
BILIRUB SERPL-MCNC: 0.8 MG/DL (ref 0.2–1)
BUN SERPL-MCNC: 14 MG/DL (ref 5–25)
CALCIUM ALBUM COR SERPL-MCNC: 10.3 MG/DL (ref 8.3–10.1)
CALCIUM SERPL-MCNC: 9.6 MG/DL (ref 8.3–10.1)
CARDIAC TROPONIN I PNL SERPL HS: 57 NG/L
CARDIAC TROPONIN I PNL SERPL HS: 61 NG/L
CARDIAC TROPONIN I PNL SERPL HS: 71 NG/L
CHLORIDE SERPL-SCNC: 99 MMOL/L (ref 96–108)
CO2 SERPL-SCNC: 30 MMOL/L (ref 21–32)
CREAT SERPL-MCNC: 4.35 MG/DL (ref 0.6–1.3)
EOSINOPHIL # BLD AUTO: 0.15 THOUSAND/ΜL (ref 0–0.61)
EOSINOPHIL NFR BLD AUTO: 2 % (ref 0–6)
ERYTHROCYTE [DISTWIDTH] IN BLOOD BY AUTOMATED COUNT: 14.7 % (ref 11.6–15.1)
GFR SERPL CREATININE-BSD FRML MDRD: 10 ML/MIN/1.73SQ M
GLUCOSE SERPL-MCNC: 103 MG/DL (ref 65–140)
GLUCOSE SERPL-MCNC: 110 MG/DL (ref 65–140)
GLUCOSE SERPL-MCNC: 129 MG/DL (ref 65–140)
GLUCOSE SERPL-MCNC: 98 MG/DL (ref 65–140)
HCT VFR BLD AUTO: 26.6 % (ref 34.8–46.1)
HGB BLD-MCNC: 8.3 G/DL (ref 11.5–15.4)
IMM GRANULOCYTES # BLD AUTO: 0.04 THOUSAND/UL (ref 0–0.2)
IMM GRANULOCYTES NFR BLD AUTO: 1 % (ref 0–2)
INR PPP: 1.13 (ref 0.84–1.19)
LYMPHOCYTES # BLD AUTO: 1.04 THOUSANDS/ΜL (ref 0.6–4.47)
LYMPHOCYTES NFR BLD AUTO: 17 % (ref 14–44)
MCH RBC QN AUTO: 31.2 PG (ref 26.8–34.3)
MCHC RBC AUTO-ENTMCNC: 31.2 G/DL (ref 31.4–37.4)
MCV RBC AUTO: 100 FL (ref 82–98)
MONOCYTES # BLD AUTO: 0.56 THOUSAND/ΜL (ref 0.17–1.22)
MONOCYTES NFR BLD AUTO: 9 % (ref 4–12)
NEUTROPHILS # BLD AUTO: 4.33 THOUSANDS/ΜL (ref 1.85–7.62)
NEUTS SEG NFR BLD AUTO: 70 % (ref 43–75)
NRBC BLD AUTO-RTO: 0 /100 WBCS
P AXIS: 43 DEGREES
PLATELET # BLD AUTO: 187 THOUSANDS/UL (ref 149–390)
PMV BLD AUTO: 11.4 FL (ref 8.9–12.7)
POTASSIUM SERPL-SCNC: 3.6 MMOL/L (ref 3.5–5.3)
PR INTERVAL: 176 MS
PROT SERPL-MCNC: 7 G/DL (ref 6.4–8.4)
PROTHROMBIN TIME: 14.7 SECONDS (ref 11.6–14.5)
QRS AXIS: 48 DEGREES
QRSD INTERVAL: 98 MS
QT INTERVAL: 402 MS
QTC INTERVAL: 430 MS
RBC # BLD AUTO: 2.66 MILLION/UL (ref 3.81–5.12)
SODIUM SERPL-SCNC: 136 MMOL/L (ref 135–147)
T WAVE AXIS: 80 DEGREES
VENTRICULAR RATE: 69 BPM
WBC # BLD AUTO: 6.16 THOUSAND/UL (ref 4.31–10.16)

## 2022-08-30 PROCEDURE — 82948 REAGENT STRIP/BLOOD GLUCOSE: CPT

## 2022-08-30 PROCEDURE — 73700 CT LOWER EXTREMITY W/O DYE: CPT

## 2022-08-30 PROCEDURE — 93005 ELECTROCARDIOGRAM TRACING: CPT

## 2022-08-30 PROCEDURE — 99285 EMERGENCY DEPT VISIT HI MDM: CPT | Performed by: EMERGENCY MEDICINE

## 2022-08-30 PROCEDURE — 96374 THER/PROPH/DIAG INJ IV PUSH: CPT

## 2022-08-30 PROCEDURE — 80053 COMPREHEN METABOLIC PANEL: CPT | Performed by: EMERGENCY MEDICINE

## 2022-08-30 PROCEDURE — 85610 PROTHROMBIN TIME: CPT | Performed by: EMERGENCY MEDICINE

## 2022-08-30 PROCEDURE — 85730 THROMBOPLASTIN TIME PARTIAL: CPT | Performed by: EMERGENCY MEDICINE

## 2022-08-30 PROCEDURE — 99220 PR INITIAL OBSERVATION CARE/DAY 70 MINUTES: CPT | Performed by: INTERNAL MEDICINE

## 2022-08-30 PROCEDURE — 70450 CT HEAD/BRAIN W/O DYE: CPT

## 2022-08-30 PROCEDURE — 85025 COMPLETE CBC W/AUTO DIFF WBC: CPT | Performed by: EMERGENCY MEDICINE

## 2022-08-30 PROCEDURE — G1004 CDSM NDSC: HCPCS

## 2022-08-30 PROCEDURE — 36415 COLL VENOUS BLD VENIPUNCTURE: CPT | Performed by: EMERGENCY MEDICINE

## 2022-08-30 PROCEDURE — 84484 ASSAY OF TROPONIN QUANT: CPT | Performed by: EMERGENCY MEDICINE

## 2022-08-30 PROCEDURE — 99285 EMERGENCY DEPT VISIT HI MDM: CPT

## 2022-08-30 PROCEDURE — 93010 ELECTROCARDIOGRAM REPORT: CPT | Performed by: INTERNAL MEDICINE

## 2022-08-30 RX ORDER — LEVOTHYROXINE SODIUM 0.05 MG/1
50 TABLET ORAL
Status: DISCONTINUED | OUTPATIENT
Start: 2022-08-30 | End: 2022-09-07 | Stop reason: HOSPADM

## 2022-08-30 RX ORDER — PREGABALIN 50 MG/1
50 CAPSULE ORAL DAILY
Status: DISCONTINUED | OUTPATIENT
Start: 2022-08-30 | End: 2022-09-07 | Stop reason: HOSPADM

## 2022-08-30 RX ORDER — LIDOCAINE 50 MG/G
1 PATCH TOPICAL DAILY
Status: DISCONTINUED | OUTPATIENT
Start: 2022-08-30 | End: 2022-09-06

## 2022-08-30 RX ORDER — MUSCLE RUB CREAM 100; 150 MG/G; MG/G
CREAM TOPICAL 4 TIMES DAILY PRN
Status: DISCONTINUED | OUTPATIENT
Start: 2022-08-30 | End: 2022-09-07 | Stop reason: HOSPADM

## 2022-08-30 RX ORDER — ATORVASTATIN CALCIUM 20 MG/1
20 TABLET, FILM COATED ORAL
Status: DISCONTINUED | OUTPATIENT
Start: 2022-08-30 | End: 2022-09-07 | Stop reason: HOSPADM

## 2022-08-30 RX ORDER — ONDANSETRON 2 MG/ML
4 INJECTION INTRAMUSCULAR; INTRAVENOUS EVERY 6 HOURS PRN
Status: DISCONTINUED | OUTPATIENT
Start: 2022-08-30 | End: 2022-09-03

## 2022-08-30 RX ORDER — WARFARIN SODIUM 5 MG/1
10 TABLET ORAL
Status: COMPLETED | OUTPATIENT
Start: 2022-08-30 | End: 2022-08-30

## 2022-08-30 RX ORDER — LORATADINE 10 MG/1
5 TABLET ORAL DAILY
Status: DISCONTINUED | OUTPATIENT
Start: 2022-08-30 | End: 2022-09-07 | Stop reason: HOSPADM

## 2022-08-30 RX ORDER — CARVEDILOL 25 MG/1
25 TABLET ORAL 2 TIMES DAILY
Status: DISCONTINUED | OUTPATIENT
Start: 2022-08-30 | End: 2022-09-07 | Stop reason: HOSPADM

## 2022-08-30 RX ORDER — OXYCODONE HYDROCHLORIDE 5 MG/1
5 TABLET ORAL EVERY 8 HOURS PRN
Status: DISCONTINUED | OUTPATIENT
Start: 2022-08-30 | End: 2022-08-31

## 2022-08-30 RX ORDER — ALBUTEROL SULFATE 90 UG/1
2 AEROSOL, METERED RESPIRATORY (INHALATION) EVERY 6 HOURS PRN
Status: DISCONTINUED | OUTPATIENT
Start: 2022-08-30 | End: 2022-09-07 | Stop reason: HOSPADM

## 2022-08-30 RX ORDER — ALPRAZOLAM 0.25 MG/1
0.25 TABLET ORAL 2 TIMES DAILY PRN
Status: DISCONTINUED | OUTPATIENT
Start: 2022-08-30 | End: 2022-09-07 | Stop reason: HOSPADM

## 2022-08-30 RX ORDER — LANOLIN ALCOHOL/MO/W.PET/CERES
3 CREAM (GRAM) TOPICAL
Status: DISCONTINUED | OUTPATIENT
Start: 2022-08-30 | End: 2022-09-07 | Stop reason: HOSPADM

## 2022-08-30 RX ORDER — SEVELAMER HYDROCHLORIDE 800 MG/1
1600 TABLET, FILM COATED ORAL
Status: DISCONTINUED | OUTPATIENT
Start: 2022-08-30 | End: 2022-09-07 | Stop reason: HOSPADM

## 2022-08-30 RX ORDER — HYDRALAZINE HYDROCHLORIDE 20 MG/ML
10 INJECTION INTRAMUSCULAR; INTRAVENOUS EVERY 6 HOURS PRN
Status: DISCONTINUED | OUTPATIENT
Start: 2022-08-30 | End: 2022-09-06

## 2022-08-30 RX ORDER — GABAPENTIN 100 MG/1
100 CAPSULE ORAL 3 TIMES DAILY
Status: DISCONTINUED | OUTPATIENT
Start: 2022-08-30 | End: 2022-09-06

## 2022-08-30 RX ORDER — CHOLECALCIFEROL (VITAMIN D3) 10 MCG
1 TABLET ORAL
Status: DISCONTINUED | OUTPATIENT
Start: 2022-08-30 | End: 2022-09-07 | Stop reason: HOSPADM

## 2022-08-30 RX ORDER — HYDROXYZINE HYDROCHLORIDE 25 MG/1
25 TABLET, FILM COATED ORAL EVERY 6 HOURS PRN
Status: DISCONTINUED | OUTPATIENT
Start: 2022-08-30 | End: 2022-09-07 | Stop reason: HOSPADM

## 2022-08-30 RX ORDER — SENNOSIDES 8.6 MG
2 TABLET ORAL
Status: DISCONTINUED | OUTPATIENT
Start: 2022-08-30 | End: 2022-09-07 | Stop reason: HOSPADM

## 2022-08-30 RX ORDER — OXYCODONE HYDROCHLORIDE 5 MG/1
5 TABLET ORAL EVERY 6 HOURS PRN
Status: DISCONTINUED | OUTPATIENT
Start: 2022-08-30 | End: 2022-08-30

## 2022-08-30 RX ORDER — HEPARIN SODIUM 5000 [USP'U]/ML
5000 INJECTION, SOLUTION INTRAVENOUS; SUBCUTANEOUS EVERY 8 HOURS SCHEDULED
Status: DISCONTINUED | OUTPATIENT
Start: 2022-08-30 | End: 2022-09-04

## 2022-08-30 RX ORDER — INSULIN GLARGINE 100 [IU]/ML
10 INJECTION, SOLUTION SUBCUTANEOUS
Status: DISCONTINUED | OUTPATIENT
Start: 2022-08-30 | End: 2022-08-31

## 2022-08-30 RX ORDER — METHOCARBAMOL 500 MG/1
500 TABLET, FILM COATED ORAL EVERY 6 HOURS SCHEDULED
Status: DISCONTINUED | OUTPATIENT
Start: 2022-08-30 | End: 2022-08-31

## 2022-08-30 RX ORDER — ACETAMINOPHEN 325 MG/1
975 TABLET ORAL EVERY 8 HOURS SCHEDULED
Status: DISCONTINUED | OUTPATIENT
Start: 2022-08-30 | End: 2022-09-07 | Stop reason: HOSPADM

## 2022-08-30 RX ORDER — PANTOPRAZOLE SODIUM 40 MG/1
40 TABLET, DELAYED RELEASE ORAL 2 TIMES DAILY
Status: DISCONTINUED | OUTPATIENT
Start: 2022-08-30 | End: 2022-09-07 | Stop reason: HOSPADM

## 2022-08-30 RX ORDER — INSULIN LISPRO 100 [IU]/ML
2-12 INJECTION, SOLUTION INTRAVENOUS; SUBCUTANEOUS
Status: DISCONTINUED | OUTPATIENT
Start: 2022-08-30 | End: 2022-08-31

## 2022-08-30 RX ORDER — MIDAZOLAM HYDROCHLORIDE 2 MG/2ML
0.25 INJECTION, SOLUTION INTRAMUSCULAR; INTRAVENOUS ONCE
Status: COMPLETED | OUTPATIENT
Start: 2022-08-30 | End: 2022-08-30

## 2022-08-30 RX ORDER — ACETAMINOPHEN 325 MG/1
650 TABLET ORAL EVERY 6 HOURS PRN
Status: DISCONTINUED | OUTPATIENT
Start: 2022-08-30 | End: 2022-09-06

## 2022-08-30 RX ADMIN — MIDAZOLAM 0.25 MG: 1 INJECTION INTRAMUSCULAR; INTRAVENOUS at 12:25

## 2022-08-30 RX ADMIN — OXYCODONE HYDROCHLORIDE 5 MG: 5 TABLET ORAL at 15:18

## 2022-08-30 RX ADMIN — LEVOTHYROXINE SODIUM 50 MCG: 50 TABLET ORAL at 16:10

## 2022-08-30 RX ADMIN — LORATADINE 5 MG: 10 TABLET ORAL at 16:10

## 2022-08-30 RX ADMIN — Medication 1 CAPSULE: at 16:10

## 2022-08-30 RX ADMIN — METHOCARBAMOL TABLETS 500 MG: 500 TABLET, COATED ORAL at 18:34

## 2022-08-30 RX ADMIN — WARFARIN SODIUM 10 MG: 5 TABLET ORAL at 18:34

## 2022-08-30 RX ADMIN — INSULIN GLARGINE 10 UNITS: 100 INJECTION, SOLUTION SUBCUTANEOUS at 20:53

## 2022-08-30 RX ADMIN — HYDROXYZINE HYDROCHLORIDE 25 MG: 25 TABLET ORAL at 18:34

## 2022-08-30 RX ADMIN — CARVEDILOL 25 MG: 25 TABLET, FILM COATED ORAL at 16:22

## 2022-08-30 RX ADMIN — GABAPENTIN 100 MG: 100 CAPSULE ORAL at 20:52

## 2022-08-30 RX ADMIN — ALPRAZOLAM 0.25 MG: 0.25 TABLET ORAL at 23:04

## 2022-08-30 RX ADMIN — ACETAMINOPHEN 650 MG: 325 TABLET ORAL at 23:03

## 2022-08-30 RX ADMIN — ACETAMINOPHEN 975 MG: 325 TABLET ORAL at 15:17

## 2022-08-30 RX ADMIN — LIDOCAINE 5% 1 PATCH: 700 PATCH TOPICAL at 16:12

## 2022-08-30 RX ADMIN — OXYCODONE HYDROCHLORIDE 5 MG: 5 TABLET ORAL at 20:51

## 2022-08-30 RX ADMIN — SERTRALINE HYDROCHLORIDE 75 MG: 50 TABLET ORAL at 16:10

## 2022-08-30 RX ADMIN — GABAPENTIN 100 MG: 100 CAPSULE ORAL at 16:10

## 2022-08-30 RX ADMIN — ALPRAZOLAM 0.25 MG: 0.25 TABLET ORAL at 16:10

## 2022-08-30 RX ADMIN — PANTOPRAZOLE SODIUM 40 MG: 40 TABLET, DELAYED RELEASE ORAL at 18:34

## 2022-08-30 RX ADMIN — ATORVASTATIN CALCIUM 20 MG: 20 TABLET, FILM COATED ORAL at 16:10

## 2022-08-30 RX ADMIN — MELATONIN 3 MG: at 20:53

## 2022-08-30 RX ADMIN — PREGABALIN 50 MG: 50 CAPSULE ORAL at 16:11

## 2022-08-30 RX ADMIN — METHOCARBAMOL TABLETS 500 MG: 500 TABLET, COATED ORAL at 23:05

## 2022-08-30 RX ADMIN — HEPARIN SODIUM 5000 UNITS: 5000 INJECTION INTRAVENOUS; SUBCUTANEOUS at 16:11

## 2022-08-30 NOTE — ED ATTENDING ATTESTATION
8/30/2022  Arnett Ahumada, DO, saw and evaluated the patient  I have discussed the patient with the resident/non-physician practitioner and agree with the resident's/non-physician practitioner's findings, Plan of Care, and MDM as documented in the resident's/non-physician practitioner's note, except where noted  All available labs and Radiology studies were reviewed  I was present for key portions of any procedure(s) performed by the resident/non-physician practitioner and I was immediately available to provide assistance  At this point I agree with the current assessment done in the Emergency Department  I have conducted an independent evaluation of this patient a history and physical is as follows: Jennifer Beck Patient presents from dialysis for transient episode of confusion/altered mental status  Dialysis was completed in its entirety today  However, she has shortened the last 2 rounds of dialysis by 30 minutes each due to discomfort  To the ED, she is no longer confused or altered but states she cannot remember the time frame in which she was reported altered  No report of hypoglycemia or measured hypoxia  No known h/o similar symptoms  She has numerous comorbidities and risks, including recent falls with head injury and right hip injury for which she was not evaluated  She continues to have right hip pain, worse with ambulation  ROS: Denies visual change, LH/dizziness, HA, midline neck or back pain, CP, SOB, abdominal pain, n/v  12 system ROS o/w negative       PE: NAD, appears comfortable, alert, GCS 15; PERRL, legally blind; no hemotympanum; no septal hematoma or epistaxis; MMM, no post OP exudate, edema or erythema, no dental trauma; no midline vert TTP, no crepitus or step-offs; HRR, no murmur; lungs CTA w/o w/r/r, POx 93% on RA (low nl); abd s/nt/nd, nl BS in all four quadrant, obese s; (-) LE edema, no calf TTP, stable pelvis, FROM extremities x4, strength and sensation appear intact; skin p/w/d; CN II-XII GI/NF, oriented  DDx: Fall with right hip pain - contusion, possible fracture, doubt dislocation  DDx:  Episode of confusion/AMS - transient hypoxia, hyperuricemia, abnormal electrolytes, ALEXI, abnormal cardiac rhythm, less likely traumatic brain injury or stroke  A/P: Will check CT head, cardiac/metabolic workup, treat symptoms, reevaluate for further work up and disposition            ED Course  ED Course as of 08/30/22 1155   Tue Aug 30, 2022   1154 Hemoglobin(!): 8 3  Baseline         Critical Care Time  Procedures

## 2022-08-30 NOTE — ASSESSMENT & PLAN NOTE
· History of DVT and PE 6 years ago  · On Coumadin, subtherapeutic, not sure if she is compliant  · Coumadin 10 mg daily  · INR tomorrow

## 2022-08-30 NOTE — ASSESSMENT & PLAN NOTE
Lab Results   Component Value Date    HGBA1C 9 4 (H) 07/09/2022       Recent Labs     08/30/22  1049   POCGLU 110       Blood Sugar Average: Last 72 hrs:  (P) 110   · On Lantus 12 units HS and NovoLog sliding scale  · Start Lantus 10 units HS with sliding scale  · Diabetic diet

## 2022-08-30 NOTE — ASSESSMENT & PLAN NOTE
Lab Results   Component Value Date    EGFR 10 08/30/2022    EGFR 4 08/20/2022    EGFR 4 08/18/2022    CREATININE 4 35 (H) 08/30/2022    CREATININE 8 21 (H) 08/20/2022    CREATININE 8 27 (H) 08/18/2022   · On dialysis TTS  · Patient remains in the hospital tomorrow, please consult Nephrology

## 2022-08-30 NOTE — ASSESSMENT & PLAN NOTE
· Hypertensive, did not take her Coreg today  · Torsemide and nifedipine were discontinued on previous admission 8/15 due to hypotension  · Restart Coreg 25 mg b i d   · Hydralazine p r n

## 2022-08-30 NOTE — ASSESSMENT & PLAN NOTE
· Patient was in her usual state of health, after finishing dialysis today she had an episode of decreased level of consciousness  Patient states she was able to hear nurses calling her name but she could not answer  Per EMS no seizure-like activities and shortly she was back at her baseline mentation  Patient denies chest pain palpitation shortness of breath nausea vomiting prior to this event  She had a sharp right-sided back pain which she has been dealing with since last year  Per ED, it was not reported that she was hypotensive during this episode  · Unclear cause  ?  Transient hypotension which she had in the past during dialysis  Less likely seizure, no seizure-like activities reported per facility, and shortly she was back at her baseline mentation  Possibly pain related    · Nonfocal neurologic exam  · CT head negative for acute pathology  · Neuro checks Q 4 for 24 hours  · Monitor on telemetry

## 2022-08-30 NOTE — ED PROVIDER NOTES
History  Chief Complaint   Patient presents with    Altered Mental Status     Per EMS, pt was at [facility] when she started with some altered mental status and staring with minimal responding to staff; resolved with EMS     Vinod Walton is a 53-year-old woman with medical history significant for end-stage renal disease on dialysis, diabetes, previous PE on warfarin, presenting after a brief episode of unresponsiveness at dialysis today  EMS reports that during dialysis, she stared off in the distance, would not respond staff  Her eyes remained open  There is no reported hypotension  Upon EMS arrival evaluation, she was hypertensive  At this time, patient reports no recollection of this episode  At this time, her only complaint is right hip pain  She reports a fall the day after she was discharged after her recent hospitalization  She was not evaluated for her right hip pain  Prior to dialysis this morning, she reports taking Percocet and Robaxin for her right hip pain  She reports taking all her medications as prescribed  No fevers, chills, dysuria, cough, congestion, rhinorrhea, abdominal pain, shortness breath, chest pain, numbness, weakness, vision changes, abdominal pain, diarrhea  Prior to Admission Medications   Prescriptions Last Dose Informant Patient Reported? Taking? ALPRAZolam (XANAX) 0 25 mg tablet  Self Yes No   Sig: Take 0 25 mg by mouth 2 (two) times a day as needed for anxiety   ALPRAZolam (XANAX) 0 5 mg tablet   Yes No   Sig: TAKE 2 AND 1/2 TABLETS DAILY IN DIVIDED DOSES AS DIRECTED     Accu-Chek Guide test strip   Yes No   Sig: use to TEST BLOOD SUGAR two to three times a day   Accu-Chek Softclix Lancets lancets  Self Yes No   Sig: 3 (three) times a day Use to test blood sugar   B Complex-C-Folic Acid (Natalia-Denys) TABS   No No   Sig: TAKE ONE TABLET BY MOUTH DAILY   KIONEX 15 GM/60ML suspension  Self Yes No   Sig: Take by mouth Takes as a shake on dialysis days Tues-Thurs_Sat Multiple Vitamin (MULTIVITAMIN ADULT PO)   Yes No   Sig: Take 500 mg by mouth in the morning  Indications: one a day menapause multivitamin   NOVOLOG FLEXPEN 100 units/mL SOPN  Self Yes No   Sig: INJECT 1 TO 5 UNITS SUBCUTANEOUSLY THREE TIMES A DAY BEFORE MELAS AS PER SLIDING SCALE   acetaminophen (TYLENOL) 325 mg tablet   No No   Sig: Take 3 tablets (975 mg total) by mouth every 8 (eight) hours   albuterol (ProAir HFA) 90 mcg/act inhaler   No No   Sig: Inhale 2 puffs every 6 (six) hours as needed for wheezing or shortness of breath   ammonium lactate (LAC-HYDRIN) 12 % cream   No No   Sig: Apply topically 2 (two) times a day   atorvastatin (LIPITOR) 20 mg tablet  Self Yes No   Sig: Take 20 mg by mouth every evening    carvedilol (COREG) 25 mg tablet   No No   Sig: TAKE ONE TABLET BY MOUTH TWICE A DAY   dicyclomine (BENTYL) 10 mg capsule   Yes No   Sig: TAKE 1 TABLET BY MOUTH EVERY 6 HOURS OR AS NEEDED FOR PAIN   gabapentin (NEURONTIN) 100 mg capsule   Yes No   Sig: Take 100 mg by mouth 3 (three) times a day   Indications: Neuropathic Pain   insulin glargine (LANTUS) 100 units/mL subcutaneous injection   No No   Sig: Inject 12 Units under the skin daily at bedtime   levothyroxine 50 mcg tablet  Self Yes No   Sig: Take 50 mcg by mouth daily   lidocaine (LIDODERM) 5 %   Yes No   lidocaine (LIDODERM) 5 %   No No   Sig: Apply 1 patch topically daily Remove & Discard patch within 12 hours or as directed by MD   loratadine (CLARITIN) 10 mg tablet  Self Yes No   Sig: Take 10 mg by mouth daily   melatonin 3 mg   No No   Sig: Take 1 tablet (3 mg total) by mouth daily at bedtime   menthol-methyl salicylate (BENGAY) 16-67 % cream   No No   Sig: Apply topically 4 (four) times a day as needed (muscle soreness)   methocarbamol (ROBAXIN) 500 mg tablet   No No   Sig: Take 1 tablet (500 mg total) by mouth every 6 (six) hours   nystatin (MYCOSTATIN) powder  Self No No   Sig: Apply topically 2 (two) times a day   ondansetron (ZOFRAN-ODT) 8 mg disintegrating tablet   Yes No   oxyCODONE (ROXICODONE) 5 immediate release tablet   No No   Sig: Take 0 5 tablets (2 5 mg total) by mouth every 6 (six) hours as needed for moderate pain for up to 10 days Continuation of care Max Daily Amount: 10 mg   pantoprazole (PROTONIX) 40 mg tablet   Yes No   Sig: take 1 tablet by mouth twice a day   polyethylene glycol (MIRALAX) 17 g packet   No No   Sig: Take 17 g by mouth daily   pregabalin (LYRICA) 50 mg capsule   No No   Sig: Take 1 PO daily, take 1 PO additionally directly have HD on HD days   senna (SENOKOT) 8 6 mg  Self No No   Sig: Take 2 tablets (17 2 mg total) by mouth daily at bedtime as needed for constipation   sertraline (ZOLOFT) 50 mg tablet   No No   Sig: Take 1 5 tablets (75 mg total) by mouth daily   sevelamer (RENAGEL) 800 mg tablet   No No   Sig: Take 2 tablets (1,600 mg total) by mouth 3 (three) times a day with meals   urea (CARMOL) 40 %   No No   Sig: APPLY TO AFFECTED AREA TOPICALLY EVERY DAY   warfarin (COUMADIN) 3 mg tablet  Self No No   Sig: Take 3 tablets (9 mg total) by mouth daily Takes 9 mg Monday Sat      Facility-Administered Medications: None       Past Medical History:   Diagnosis Date    Abnormal uterine bleeding (AUB)     Anemia     Anxiety     Arthritis     Chronic kidney disease     Claustrophobia     Diabetes mellitus [facility] [address] [facility]     Disease of thyroid gland     DVT (deep venous thrombosis) (HCC)     End stage kidney disease (HCC)     Foot ulcer due to secondary DM ([facility] [address] [facility]     Hemodialysis patient ([facility] [address] )     Tues, Thurs, Sat    Hypertension     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[facility] [address] [facility]     right eye    Morbid obesity ([facility] [address] [facility]     Osteomyelitis of fifth toe of right foot ([facility] [address] [facility]     Panic attacks     Pulmonary embolism (HCC)     Reflux esophagitis     Thrombophlebitis of saphenous vein     Warfarin anticoagulation        Past Surgical History:   Procedure Laterality Date    AMPUTATION      r 4th toe    ARTERIOVENOUS GRAFT PLACEMENT      CATARACT EXTRACTION Bilateral     CERVICAL BIOPSY  W/ LOOP ELECTRODE EXCISION       SECTION      DIALYSIS FISTULA CREATION      DILATION AND CURETTAGE OF UTERUS      ENDOMETRIAL ABLATION W/ NOVASURE      EYE SURGERY      HYSTERECTOMY      @ age 57 (complete)    IR AV FISTULAGRAM/GRAFTOGRAM  10/11/2019    IR AV FISTULAGRAM/GRAFTOGRAM  2020    IR AV FISTULAGRAM/GRAFTOGRAM  2020    IR NON-TUNNELED CENTRAL LINE PLACEMENT  2021    IR NON-TUNNELED CENTRAL LINE PLACEMENT  10/4/2021    OOPHORECTOMY Bilateral     @ age 57    [facility] Right 2021    Procedure: AMPUTATION TRANSMETATARSAL (TMA);  Surgeon: Ganga Garcia DPM;  [facility];  Service: Podiatry    CA AMPUTATION TOE,MT-P JT Right 2020    Procedure: AMPUTATION TOE- 5th;  Surgeon: Ganga Garcia DPM;  [facility]: [facility];  Service: Podiatry    CA COLONOSCOPY FLX DX W/COLLJ 1978 PFRMD N/A 3/13/2019    Procedure: COLONOSCOPY;  Surgeon: Lety Yang MD;  Location: [facility]; Service: Gastroenterology    CA ESOPHAGOGASTRODUODENOSCOPY TRANSORAL DIAGNOSTIC N/A 3/13/2019    Procedure: ESOPHAGOGASTRODUODENOSCOPY (EGD); Surgeon: Lety Yang MD;  [facility]: [facility];   Service: Gastroenterology    CA LAPAROSCOPY W TOT HYSTERECT UTERUS 250 GRAM OR LESS N/A 2016    Procedure: HYSTERECTOMY LAPAROSCOPIC TOTAL [facility]), with bilateral salpingectomy;  Surgeon: Aly Suresh DO;  [facility];  Service: Gynecology    THROMBECTOMY / ARTERIOVENOUS GRAFT REVISION      TOE SURGERY Right     removal of the 4th toe    WOUND DEBRIDEMENT Right 10/8/2021    Procedure: R 1st MTPJ washout ;  Surgeon: Dada Gu DPM;  [facility];  Service: Podiatry       Family History   Problem Relation Age of Onset    Heart disease Family     Diabetes Family     Heart disease Mother     Cancer Brother         neck    Throat cancer Brother     Ovarian cancer Maternal Aunt 36     I have reviewed and agree with the history as documented  E-Cigarette/Vaping    E-Cigarette Use Never User      E-Cigarette/Vaping Substances    Nicotine No     THC No     CBD No     Flavoring No     Other No     Unknown No      Social History     Tobacco Use    Smoking status: Never Smoker    Smokeless tobacco: Never Used   Vaping Use    Vaping Use: Never used   Substance Use Topics    Alcohol use: Never     Alcohol/week: 0 0 standard drinks    Drug use: No        Review of Systems   All other systems reviewed and are negative  Physical Exam  ED Triage Vitals   Temperature Pulse Respirations Blood Pressure SpO2   08/30/22 1040 08/30/22 1040 08/30/22 1040 08/30/22 1040 08/30/22 1040   97 8 °F (36 6 °C) 75 20 (!) 209/85 95 %      Temp Source Heart Rate Source Patient Position - Orthostatic VS BP Location FiO2 (%)   08/30/22 1040 08/30/22 1040 08/30/22 1112 08/30/22 1112 --   Oral Monitor Sitting Right arm       Pain Score       08/30/22 1517       10 - Worst Possible Pain             Orthostatic Vital Signs  Vitals:    08/30/22 1112 08/30/22 1145 08/30/22 1520 08/30/22 1615   BP: (!) 216/95 (!) 197/74 (!) 217/79 143/87   Pulse: 74 78 74 82   Patient Position - Orthostatic VS: Sitting Lying Lying        Physical Exam  Vitals and nursing note reviewed  HENT:      Head: Normocephalic and atraumatic  Right Ear: External ear normal       Left Ear: External ear normal       Nose: Nose normal       Mouth/Throat:      Mouth: Mucous membranes are moist    Eyes:      Extraocular Movements: Extraocular movements intact  Pupils: Pupils are equal, round, and reactive to light  Cardiovascular:      Rate and Rhythm: Normal rate and regular rhythm  Pulses: Normal pulses  Pulmonary:      Effort: Pulmonary effort is normal  No respiratory distress  Breath sounds: Normal breath sounds  Abdominal:      General: There is no distension  Palpations: Abdomen is soft  Tenderness: There is no abdominal tenderness  Musculoskeletal:         General: No deformity  Skin:     General: Skin is warm and dry  Neurological:      General: No focal deficit present  Mental Status: She is alert  GCS: GCS eye subscore is 4  GCS verbal subscore is 4  GCS motor subscore is 6  Comments: CN2-12 normal  5/5 strength and normal sensation in all 4 extremities  Mildly confused  Alert, oriented to person and place, not time or today's occurences             ED Medications  Medications   acetaminophen (TYLENOL) tablet 975 mg (975 mg Oral Given 8/30/22 1517)   albuterol (PROVENTIL HFA,VENTOLIN HFA) inhaler 2 puff (has no administration in time range)   ALPRAZolam (XANAX) tablet 0 25 mg (0 25 mg Oral Given 8/30/22 1610)   atorvastatin (LIPITOR) tablet 20 mg (20 mg Oral Given 8/30/22 1610)   carvedilol (COREG) tablet 25 mg (25 mg Oral Given 8/30/22 1622)   gabapentin (NEURONTIN) capsule 100 mg (100 mg Oral Given 8/30/22 1610)   levothyroxine tablet 50 mcg (50 mcg Oral Given 8/30/22 1610)   loratadine (CLARITIN) tablet 5 mg (5 mg Oral Given 8/30/22 1610)   melatonin tablet 3 mg (has no administration in time range)   methocarbamol (ROBAXIN) tablet 500 mg (500 mg Oral Given 8/30/22 1834)   pantoprazole (PROTONIX) EC tablet 40 mg (40 mg Oral Given 8/30/22 1834)   pregabalin (LYRICA) capsule 50 mg (50 mg Oral Given 8/30/22 1611)   senna (SENOKOT) tablet 17 2 mg (has no administration in time range)   sertraline (ZOLOFT) tablet 75 mg (75 mg Oral Given 8/30/22 1610)   sevelamer (RENAGEL) tablet 1,600 mg (1,600 mg Oral Not Given 8/30/22 1627)   b complex-vitamin C-folic acid (NEPHROCAPS) capsule 1 capsule (1 capsule Oral Given 8/30/22 1610)   acetaminophen (TYLENOL) tablet 650 mg (has no administration in time range)   ondansetron (ZOFRAN) injection 4 mg (has no administration in time range)   insulin glargine (LANTUS) subcutaneous injection 10 Units 0 1 mL (has no administration in time range)   insulin lispro (HumaLOG) 100 units/mL subcutaneous injection 2-12 Units (2 Units Subcutaneous Not Given 8/30/22 1625)   oxyCODONE (ROXICODONE) IR tablet 5 mg (5 mg Oral Not Given 8/30/22 1738)   lidocaine (LIDODERM) 5 % patch 1 patch (1 patch Topical Medication Applied 8/30/22 1612)   menthol-methyl salicylate (BENGAY) 23-97 % cream (has no administration in time range)   hydrALAZINE (APRESOLINE) injection 10 mg (10 mg Intravenous Not Given 8/30/22 1613)   heparin (porcine) subcutaneous injection 5,000 Units (5,000 Units Subcutaneous Given 8/30/22 1611)   hydrOXYzine HCL (ATARAX) tablet 25 mg (25 mg Oral Given 8/30/22 1834)   midazolam (VERSED) injection 0 25 mg (0 25 mg Intravenous Given 8/30/22 1225)   warfarin (COUMADIN) tablet 10 mg (10 mg Oral Given 8/30/22 1834)       Diagnostic Studies  Results Reviewed     Procedure Component Value Units Date/Time    HS Troponin I 4hr [[id number]]  (Abnormal) Collected: 08/30/22 1520    Lab Status: Final result Specimen: Blood from Arm, Right Updated: 08/30/22 1557     hs TnI 4hr 57 ng/L      Delta 4hr hsTnI -14 ng/L     HS Troponin I 2hr [[id number]]  (Abnormal) Resulted: 08/30/22 1511    Lab Status: Final result Specimen: Blood Updated: 08/30/22 1511     hs TnI 2hr 61 ng/L      Delta 2hr hsTnI -10 ng/L     UA w Reflex to Microscopic w Reflex to Culture [[id number]]     Lab Status: No result Specimen: Urine     Fingerstick Glucose (POCT) [[id number]]  (Normal) Collected: 08/30/22 1049    Lab Status: Final result Updated: 08/30/22 1321     POC Glucose 110 mg/dl     HS Troponin 0hr (reflex protocol) [[id number]]  (Abnormal) Collected: 08/30/22 1123    Lab Status: Final result Specimen: Blood from Arm, Right Updated: 08/30/22 1219     hs TnI 0hr 71 ng/L     Protime-INR [[id number]]  (Abnormal) Collected: 08/30/22 1123    Lab Status: Final result Specimen: Blood from Arm, Right Updated: 08/30/22 1214     Protime 14 7 seconds      INR 1 13 APTT [[id number]]  (Normal) Collected: 08/30/22 1123    Lab Status: Final result Specimen: Blood from Arm, Right Updated: 08/30/22 1214     PTT 28 seconds     Comprehensive metabolic panel [[id number]]  (Abnormal) Collected: 08/30/22 1123    Lab Status: Final result Specimen: Blood from Arm, Right Updated: 08/30/22 1207     Sodium 136 mmol/L      Potassium 3 6 mmol/L      Chloride 99 mmol/L      CO2 30 mmol/L      ANION GAP 7 mmol/L      BUN 14 mg/dL      Creatinine 4 35 mg/dL      Glucose 103 mg/dL      Calcium 9 6 mg/dL      Corrected Calcium 10 3 mg/dL      AST 18 U/L      ALT 17 U/L      Alkaline Phosphatase 102 U/L      Total Protein 7 0 g/dL      Albumin 3 1 g/dL      Total Bilirubin 0 80 mg/dL      eGFR 10 ml/min/1 73sq m     Narrative:      National Kid[employer] Disease Foundation guidelines for Chronic Kidney Disease (CKD):     Stage 1 with normal or high GFR (GFR > 90 mL/min/1 73 square meters)    Stage 2 Mild CKD (GFR = 60-89 mL/min/1 73 square meters)    Stage 3A Moderate CKD (GFR = 45-59 mL/min/1 73 square meters)    Stage 3B Moderate CKD (GFR = 30-44 mL/min/1 73 square meters)    Stage 4 Severe CKD (GFR = 15-29 mL/min/1 73 square meters)    Stage 5 End Stage CKD (GFR <15 mL/min/1 73 square meters)  Note: GFR calculation is accurate only with a steady state creatinine    CBC and differential [[id number]]  (Abnormal) Collected: 08/30/22 1123    Lab Status: Final result Specimen: Blood from Arm, Right Updated: 08/30/22 1143     WBC 6 16 Thousand/uL      RBC 2 66 Million/uL      Hemoglobin 8 3 g/dL      Hematocrit 26 6 %       fL      MCH 31 2 pg      MCHC 31 2 g/dL      RDW 14 7 %      MPV 11 4 fL      Platelets 505 Thousands/uL      nRBC 0 /100 WBCs      Neutrophils Relative 70 %      Immat GRANS % 1 %      Lymphocytes Relative 17 %      Monocytes Relative 9 %      Eosinophils Relative 2 %      Basophils Relative 1 %      Neutrophils Absolute 4 33 Thousands/µL      Immature Grans Absolute 0 04 Thousand/uL      Lymphocytes Absolute 1 04 Thousands/µL      Monocytes Absolute 0 56 Thousand/µL      Eosinophils Absolute 0 15 Thousand/µL      Basophils Absolute 0 04 Thousands/µL                  CT head without contrast   Final Result by Cheli Friday, DO (08/30 1311)   No acute intracranial abnormality  Workstation performed: [id number]         CT lower extremity wo contrast right   Final Result by Mely Marcelo MD (08/30 1316)      No acute displaced fracture  Mild osteoarthrosis  Possible cystitis  Workstation performed: [id number]               Procedures  Procedures      ED Course  ED Course as of 08/30/22 1905   Tue Aug 30, 2022   1149 Hemoglobin(!): 8 3  8 2 on 8/20   1149 Procedure Note: EKG  Date/Time: 08/30/22 11:49 AM   Interpreted by: Mindy Paez    Indications / Diagnosis: episode of confusion  ECG reviewed by me, the ED Provider: yes   The EKG demonstrates:  Rhythm: normal sinus  Intervals: normal intervals  Axis: normal axis  QRS/Blocks: normal QRS  ST Changes: No acute ST Changes, no STD/BARBARA        1219 POCT INR: 1 13   1224 hs TnI 0hr(!): 71  Will continue to trend   1316 CT head without contrast  Stable calcified hemangioma, no acute findings  Radiology to read RLE CT                                       MDM  Number of Diagnoses or Management Options  Elevated troponin  Diagnosis management comments: 57 yo woman presenting with brief unresponsive episode  Concerning for uremia, dysrhythmia, intracranial hemorrhage given recent elevated INR, anemia, ACS  Right hip pain concerning for fracture given recent fall, given patient's body habitus, will obtain CT RLE  Troponin elevated to 71  Remainder of work up unremarkable  CTs normal  Admitted to medicine for cardiac evaluation          Amount and/or Complexity of Data Reviewed  Clinical lab tests: reviewed  Tests in the radiology section of CPT®: reviewed  Tests in the medicine section of CPT®: reviewed  Decide to obtain previous medical records or to obtain history from someone other than the patient: yes        Disposition  Final diagnoses:   Elevated troponin     Time reflects when diagnosis was documented in both MDM as applicable and the Disposition within this note     Time User Action Codes Description Comment    8/30/2022  1:24 PM Kris Speaker Add [R77 8] Elevated troponin       ED Disposition     ED Disposition   Admit    Condition   Stable    Date/Time   Tue Aug 30, 2022  1:24 PM    Comment   --         Follow-up Information    None         Current Discharge Medication List      CONTINUE these medications which have NOT CHANGED    Details   Accu-Chek Guide test strip use to TEST BLOOD SUGAR two to three times a day      Accu-Chek Softclix Lancets lancets 3 (three) times a day Use to test blood sugar      acetaminophen (TYLENOL) 325 mg tablet Take 3 tablets (975 mg total) by mouth every 8 (eight) hours  Refills: 0    Associated Diagnoses: Intractable back pain      albuterol (ProAir HFA) 90 mcg/act inhaler Inhale 2 puffs every 6 (six) hours as needed for wheezing or shortness of breath  Qty: 8 5 g, Refills: 0    Comments: Substitution to a formulary equivalent within the same pharmaceutical class is authorized    Associated Diagnoses: Flu-like symptoms      !! ALPRAZolam (XANAX) 0 25 mg tablet Take 0 25 mg by mouth 2 (two) times a day as needed for anxiety      !! ALPRAZolam (XANAX) 0 5 mg tablet TAKE 2 AND 1/2 TABLETS DAILY IN DIVIDED DOSES AS DIRECTED       ammonium lactate (LAC-HYDRIN) 12 % cream Apply topically 2 (two) times a day  Qty: 385 g, Refills: 0    Associated Diagnoses: Pruritus      atorvastatin (LIPITOR) 20 mg tablet Take 20 mg by mouth every evening   Refills: 0      B Complex-C-Folic Acid (Natalia-Denys) TABS TAKE ONE TABLET BY MOUTH DAILY  Qty: 90 tablet, Refills: 3    Associated Diagnoses: ESRD on dialysis (HCC)      carvedilol (COREG) 25 mg tablet TAKE ONE TABLET BY MOUTH TWICE A DAY  Qty: 180 tablet, Refills: 7    Associated Diagnoses: CKD stage 5 secondary to hypertension (HCC)      dicyclomine (BENTYL) 10 mg capsule TAKE 1 TABLET BY MOUTH EVERY 6 HOURS OR AS NEEDED FOR PAIN      gabapentin (NEURONTIN) 100 mg capsule Take 100 mg by mouth 3 (three) times a day  Indications: Neuropathic Pain      insulin glargine (LANTUS) 100 units/mL subcutaneous injection Inject 12 Units under the skin daily at bedtime  Qty: 10 mL, Refills: 0    Associated Diagnoses: Diabetic polyneuropathy associated with type 2 diabetes mellitus (HCC)      KIONEX 15 GM/60ML suspension Take by mouth Takes as a shake on dialysis days Tues-Thurs_Sat   Refills: 0      levothyroxine 50 mcg tablet Take 50 mcg by mouth daily      !! lidocaine (LIDODERM) 5 %       !! lidocaine (LIDODERM) 5 % Apply 1 patch topically daily Remove & Discard patch within 12 hours or as directed by MD  Qty: 14 patch, Refills: 0    Associated Diagnoses: Back pain      loratadine (CLARITIN) 10 mg tablet Take 10 mg by mouth daily      melatonin 3 mg Take 1 tablet (3 mg total) by mouth daily at bedtime  Qty: 30 tablet, Refills: 0    Associated Diagnoses: Insomnia      menthol-methyl salicylate (BENGAY) 60-83 % cream Apply topically 4 (four) times a day as needed (muscle soreness)  Refills: 0    Associated Diagnoses: Intractable back pain      methocarbamol (ROBAXIN) 500 mg tablet Take 1 tablet (500 mg total) by mouth every 6 (six) hours  Qty: 60 tablet, Refills: 0    Associated Diagnoses: Intractable back pain      Multiple Vitamin (MULTIVITAMIN ADULT PO) Take 500 mg by mouth in the morning   Indications: one a day menapause multivitamin      NOVOLOG FLEXPEN 100 units/mL SOPN INJECT 1 TO 5 UNITS SUBCUTANEOUSLY THREE TIMES A DAY BEFORE MELAS AS PER SLIDING SCALE      nystatin (MYCOSTATIN) powder Apply topically 2 (two) times a day  Qty: 15 g, Refills: 0    Associated Diagnoses: Candidiasis of breast      ondansetron (ZOFRAN-ODT) 8 mg disintegrating tablet oxyCODONE (ROXICODONE) 5 immediate release tablet Take 0 5 tablets (2 5 mg total) by mouth every 6 (six) hours as needed for moderate pain for up to 10 days Continuation of care Max Daily Amount: 10 mg  Qty: 10 tablet, Refills: 0    Associated Diagnoses: Intractable back pain      pantoprazole (PROTONIX) 40 mg tablet take 1 tablet by mouth twice a day      polyethylene glycol (MIRALAX) 17 g packet Take 17 g by mouth daily  Refills: 0    Associated Diagnoses: Constipation, unspecified constipation type      pregabalin (LYRICA) 50 mg capsule Take 1 PO daily, take 1 PO additionally directly have HD on HD days  Qty: 45 capsule, Refills: 2    Associated Diagnoses: Diabetic polyneuropathy associated with type 2 diabetes mellitus ([facility] [address] ); Lumbar radiculopathy      senna (SENOKOT) 8 6 mg Take 2 tablets (17 2 mg total) by mouth daily at bedtime as needed for constipation  Qty: 30 each, Refills: 0    Associated Diagnoses: Ambulatory dysfunction      sertraline (ZOLOFT) 50 mg tablet Take 1 5 tablets (75 mg total) by mouth daily    Associated Diagnoses: Anxiety disorder      sevelamer (RENAGEL) 800 mg tablet Take 2 tablets (1,600 mg total) by mouth 3 (three) times a day with meals  Qty: 30 tablet, Refills: 0    Associated Diagnoses: ESRD on hemodialysis (HCC)      urea (CARMOL) 40 % APPLY TO AFFECTED AREA TOPICALLY EVERY DAY  Qty: 85 g, Refills: 2    Associated Diagnoses: Diabetic polyneuropathy associated with type 2 diabetes mellitus (HCC)      warfarin (COUMADIN) 3 mg tablet Take 3 tablets (9 mg total) by mouth daily Takes 9 mg Monday Sat  Qty: 10 tablet, Refills: 0    Associated Diagnoses: History of DVT (deep vein thrombosis)       ! ! - Potential duplicate medications found  Please discuss with provider  No discharge procedures on file  PDMP Review       Value Time User    PDMP Reviewed  Yes 8/12/2022  7:31 PM Abbie Vickers MD           ED Provider  Attending physically available and evaluated Raine Babb I managed the patient along with the ED Attending      Electronically Signed by         Celina Flores MD  08/30/22 4933

## 2022-08-30 NOTE — ASSESSMENT & PLAN NOTE
· Suspect non MI troponin elevation  · Patient is hypertensive  · Denies any chest pain palpitation or shortness of breath, EKG without acute ischemic changes  · Continue to trend troponin, EKG

## 2022-08-30 NOTE — ASSESSMENT & PLAN NOTE
· Reviewed note from neuro surgery 08/04/2022  · Low back pain probably/likely facet joint related  Multiple admissions and ED visits for right-sided back pain that radiates to her legs, more on the right side  Prior MRI demonstrated moderately advanced degenerative lumbar spine disease at multi levels  L4-5 disc herniation more on the right with moderate central canal stenosis  MRI with questionable L4-L5 osteomyelitis/diskitis per neuro surgery and ID highly unlikely  Underwent bilateral L3, L4-L5 dorsal ramus block at [facility] in May 2022 with no improvement in symptoms  · Due to recent fall prior to her previous admission at the beginning of August CT of the right leg was ordered in the ED  No acute displaced fracture, mild osteoarthrosis  Possible cystitis   · Due to possible cystitis on CT scan check UA  Patient makes minimal urine  Denies dysuria    · Aqua K  · Bengay  · Oxycodone  · Methocarbamol  · Continue Lyrica

## 2022-08-30 NOTE — H&P
[facility]  H&P- Baylee Loja 1965, 56 y o  female MRN: [id number]  Unit/Bed#: ED 20 Encounter: [id number]  Primary Care Provider: Amber Jackson MD   Date and time admitted to hospital: 8/30/2022 10:34 AM    * Decreased level of consciousness  Assessment & Plan  · Patient was in her usual state of health, after finishing dialysis today she had an episode of decreased level of consciousness  Patient states she was able to hear nurses calling her name but she could not answer  Per EMS no seizure-like activities and shortly she was back at her baseline mentation  Patient denies chest pain palpitation shortness of breath nausea vomiting prior to this event  She had a sharp right-sided back pain which she has been dealing with since last year  Per ED, it was not reported that she was hypotensive during this episode  · Unclear cause  ?  Transient hypotension which she had in the past during dialysis  Less likely seizure, no seizure-like activities reported per facility, and shortly she was back at her baseline mentation  Possibly pain related  · Nonfocal neurologic exam  · CT head negative for acute pathology  · Neuro checks Q 4 for 24 hours  · Monitor on telemetry    Lumbar radiculopathy  Assessment & Plan  · Reviewed note from neuro surgery 08/04/2022  · Low back pain probably/likely facet joint related  Multiple admissions and ED visits for right-sided back pain that radiates to her legs, more on the right side  Prior MRI demonstrated moderately advanced degenerative lumbar spine disease at multi levels  L4-5 disc herniation more on the right with moderate central canal stenosis  MRI with questionable L4-L5 osteomyelitis/diskitis per neuro surgery and ID highly unlikely  Underwent bilateral L3, L4-L5 dorsal ramus block at [facility] in May 2022 with no improvement in symptoms    · Due to recent fall prior to her previous admission at the beginning of August CT of the right leg was ordered in the ED  No acute displaced fracture, mild osteoarthrosis  Possible cystitis   · Due to possible cystitis on CT scan check UA  Patient makes minimal urine  Denies dysuria  · Aqua K  · Bengay  · Oxycodone  · Methocarbamol  · Continue Lyrica    Elevated troponin  Assessment & Plan  · Suspect non MI troponin elevation  · Patient is hypertensive  · Denies any chest pain palpitation or shortness of breath, EKG without acute ischemic changes  · Continue to trend troponin, EKG    Constipation  Assessment & Plan  · Continue bowel regimen    Chronic anticoagulation  Assessment & Plan  · History of DVT and PE 6 years ago  · Patient is on Coumadin, her INR is subtherapeutic, seems to be a little bit confused which medications is she taking  Not sure about compliance  · INR 1 13  · Coumadin 10 mg daily    Chronic pain syndrome  Assessment & Plan  · Patient seems to have chronic low back pain, follows pain management as outpatient    Pulmonary embolus ([facility] )  Assessment & Plan  · History of DVT and PE 6 years ago  · On Coumadin, subtherapeutic, not sure if she is compliant  · Coumadin 10 mg daily  · INR tomorrow    Acquired hypothyroidism  Assessment & Plan  · Continue levothyroxine 50 mcg daily    [facility] )  Assessment & Plan  Lab Results   Component Value Date    HGBA1C 9 4 (H) 07/09/2022       Recent Labs     08/30/22  1049   POCGLU 110       Blood Sugar Average: Last 72 hrs:  (P) 110   · On Lantus 12 units HS and NovoLog sliding scale  · Start Lantus 10 units HS with sliding scale  · Diabetic diet    Allergic rhinitis  Assessment & Plan  · Continue Claritin    Anemia of chronic disease  Assessment & Plan  · Secondary to end-stage renal disease  · Hemoglobin at baseline    Esophageal reflux  Assessment & Plan  · Continue PPI    Anxiety disorder  Assessment & Plan  · Continue Xanax 0 25 mg b i d  p r n    · Continue Zoloft 75 mg daily    ESRD on hemodialysis  Assessment & Plan  Lab Results   Component Value Date    EGFR 10 08/30/2022    EGFR 4 08/20/2022    EGFR 4 08/18/2022    CREATININE 4 35 (H) 08/30/2022    CREATININE 8 21 (H) 08/20/2022    CREATININE 8 27 (H) 08/18/2022   · On dialysis TTS  · Patient remains in the hospital tomorrow, please consult Nephrology    Essential hypertension  Assessment & Plan  · Hypertensive, did not take her Coreg today  · Torsemide and nifedipine were discontinued on previous admission 8/15 due to hypotension  · Restart Coreg 25 mg b i d   · Hydralazine p r n  VTE Pharmacologic Prophylaxis: VTE Score: 3 Moderate Risk (Score 3-4) - Pharmacological DVT Prophylaxis Ordered: warfarin (Coumadin)  Code Status: Level 1 - Full Code   Discussion with family: Updated  () at bedside  Anticipated Length of Stay: Patient will be admitted on an observation basis with an anticipated length of stay of less than 2 midnights secondary to Decreased level of consciousness  Total Time for Visit, including Counseling / Coordination of Care: 60 minutes Greater than 50% of this total time spent on direct patient counseling and coordination of care  Chief Complaint:  Decreased level of consciousness    History of Present Illness:  Lynda Platt is a 56 y o  female with a PMH of end-stage renal disease, hypertension, type 2 diabetes, diabetic neuropathy, GERD, constipation, chronic pain syndrome, history of DVT/PE who presents with decreased level of consciousness  Patient was in her usual state of health  She went to dialysis  After finishing dialysis she stared off, per patient she was able to hear nurses calling her but she did not answer  They called EMS  It was not reported if she had low blood pressure after dialysis  Per EMS no seizure-like activities, shortness she was back at her baseline mentation    Per patient denies headache, lightheadedness, dizziness, chest pain palpitation shortness of breath nausea vomiting prior to the event  Patient has sharp right-sided back pain which he has been dealing with since last year  She was evaluated by multiple providers, outpatient pain management  Review of Systems:  Review of Systems   Constitutional: Negative for activity change and appetite change  HENT: Negative  Eyes: Negative  Respiratory: Negative for cough, chest tightness and shortness of breath  Cardiovascular: Negative for chest pain, palpitations and leg swelling  Gastrointestinal: Negative for abdominal distention and abdominal pain  Endocrine: Negative  Genitourinary: Negative for dysuria  Musculoskeletal:        Low back pain especially on the right side radiating to the right like   Skin: Negative  Neurological: Negative for dizziness, seizures, syncope, light-headedness and headaches  Hematological: Negative      Psychiatric/Behavioral:        Anxious       Past Medical and Surgical History:   Past Medical History:   Diagnosis Date    Abnormal uterine bleeding (AUB)     Anemia     Anxiety     Arthritis     Chronic kidney disease     Claustrophobia     Diabetes mellitus ([facility] )     Disease of thyroid gland     [facility]     End stage kidney disease (HCC)     Foot ulcer due to secondary DM ([facility] )     Hemodialysis patient ([place] [id number] )     Tues, Thurs, Sat    Hypertension     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[facility] )     right eye    Morbid obesity (HCC)     Osteomyelitis of fifth toe of right foot (HCC)     Panic attacks     Pulmonary embolism ([facility]     Reflux esophagitis     Thrombophlebitis of saphenous vein     Warfarin anticoagulation        Past Surgical History:   Procedure Laterality Date    AMPUTATION      r 4th toe    ARTERIOVENOUS GRAFT PLACEMENT      CATARACT EXTRACTION Bilateral     CERVICAL BIOPSY  W/ LOOP ELECTRODE EXCISION       SECTION      DIALYSIS FISTULA CREATION      DILATION AND CURETTAGE OF UTERUS  ENDOMETRIAL ABLATION W/ NOVASURE      EYE SURGERY      HYSTERECTOMY      @ age 57 (complete)    IR AV FISTULAGRAM/GRAFTOGRAM  10/11/2019    IR AV FISTULAGRAM/GRAFTOGRAM  8/12/2020    IR AV FISTULAGRAM/GRAFTOGRAM  12/23/2020    IR NON-TUNNELED CENTRAL LINE PLACEMENT  6/29/2021    IR NON-TUNNELED CENTRAL LINE PLACEMENT  10/4/2021    OOPHORECTOMY Bilateral     @ age 57    [facility] Right 12/26/2021    Procedure: AMPUTATION TRANSMETATARSAL (TMA);  Surgeon: Yg Ferrer DPM;  [facility]: [facility];  Service: Podiatry    TX AMPUTATION TOE,MT-P JT Right 5/28/2020    Procedure: AMPUTATION TOE- 5th;  Surgeon: Yg Ferrer DPM;  Location: [facility];  Service: Podiatry    TX COLONOSCOPY FLX DX W/[address] PFRMD N/A 3/13/2019    Procedure: COLONOSCOPY;  Surgeon: Hong Orona MD;  Location: [facility]; Service: Gastroenterology    TX ESOPHAGOGASTRODUODENOSCOPY TRANSORAL DIAGNOSTIC N/A 3/13/2019    Procedure: ESOPHAGOGASTRODUODENOSCOPY (EGD); Surgeon: Hong Orona MD;  Location: [facility]; Service: Gastroenterology    TX LAPAROSCOPY W TOT HYSTERECT UTERUS 250 GRAM OR LESS N/A 2/16/2016    Procedure: HYSTERECTOMY LAPAROSCOPIC TOTAL Psychiatric), with bilateral salpingectomy;  Surgeon: Saul Cheney DO;  [facility]: [facility];  Service: Gynecology    THROMBECTOMY / ARTERIOVENOUS GRAFT REVISION      TOE SURGERY Right     removal of the 4th toe    WOUND DEBRIDEMENT Right 10/8/2021    Procedure: R 1st MTPJ washout ;  Surgeon: Deb Dent DPM;  Location: [facility];  Service: Podiatry       Meds/Allergies:  Prior to Admission medications    Medication Sig Start Date End Date Taking?  Authorizing Provider   Accu-Chek Guide test strip use to TEST BLOOD SUGAR two to three times a day 7/5/21   Historical Provider, MD   Accu-Chek Softclix Lancets lancets 3 (three) times a day Use to test blood sugar 12/30/20   Historical Provider, MD   acetaminophen (TYLENOL) 325 mg tablet Take 3 tablets (975 mg total) by mouth every 8 (eight) hours 8/21/22   Lesia Mendoza PA-C   albuterol (ProAir HFA) 90 mcg/act inhaler Inhale 2 puffs every 6 (six) hours as needed for wheezing or shortness of breath 7/3/21   Tiny Redo, DO   ALPRAZolam Nathaniel Bathe) 0 25 mg tablet Take 0 25 mg by mouth 2 (two) times a day as needed for anxiety    Historical Provider, MD   ALPRAZolam Nathaniel Bathe) 0 5 mg tablet TAKE 2 AND 1/2 TABLETS DAILY IN DIVIDED DOSES AS DIRECTED  3/14/22   Historical Provider, MD   ammonium lactate (LAC-HYDRIN) 12 % cream Apply topically 2 (two) times a day 11/13/21   Flaquito Regalaod MD   atorvastatin (LIPITOR) 20 mg tablet Take 20 mg by mouth every evening  4/24/18   Historical Provider, MD   B Complex-C-Folic Acid (Natalia-Denys) TABS TAKE ONE TABLET BY MOUTH DAILY 5/17/22   Whitney Saunders, DO   carvedilol (COREG) 25 mg tablet TAKE ONE TABLET BY MOUTH TWICE A DAY 6/17/22   Whitney Ranks, DO   dicyclomine (BENTYL) 10 mg capsule TAKE 1 TABLET BY MOUTH EVERY 6 HOURS OR AS NEEDED FOR PAIN 3/10/22   Historical Provider, MD   gabapentin (NEURONTIN) 100 mg capsule Take 100 mg by mouth 3 (three) times a day   Indications: Neuropathic Pain    Historical Provider, MD   insulin glargine (LANTUS) 100 units/mL subcutaneous injection Inject 12 Units under the skin daily at bedtime 7/14/22   Pat Manuel PA-C   KIONEX 15 GM/60ML suspension Take by mouth Takes as a shake on dialysis days Tues-Thurs_Sat  12/10/18   Historical Provider, MD   levothyroxine 50 mcg tablet Take 50 mcg by mouth daily    Historical Provider, MD   lidocaine (LIDODERM) 5 %  4/20/22   Historical Provider, MD   lidocaine (LIDODERM) 5 % Apply 1 patch topically daily Remove & Discard patch within 12 hours or as directed by MD 8/5/22   [facility],    loratadine (CLARITIN) 10 mg tablet Take 10 mg by mouth daily    Historical Provider, MD   melatonin 3 mg Take 1 tablet (3 mg total) by mouth daily at bedtime 11/13/21   Darryn Bates MD   menthol-methyl salicylate (BENGAY) 89-24 % cream Apply topically 4 (four) times a day as needed (muscle soreness) 8/21/22   Wade Dale PA-C   methocarbamol (ROBAXIN) 500 mg tablet Take 1 tablet (500 mg total) by mouth every 6 (six) hours 8/21/22   Wade Dale PA-C   Multiple Vitamin (MULTIVITAMIN ADULT PO) Take 500 mg by mouth in the morning   Indications: one a day menapause multivitamin    Historical Provider, MD Christopher Pugh 100 units/mL SOPN INJECT 1 TO 5 UNITS SUBCUTANEOUSLY THREE TIMES A DAY BEFORE MELAS AS PER SLIDING SCALE 4/30/20   Historical Provider, MD   nystatin (MYCOSTATIN) powder Apply topically 2 (two) times a day 8/31/19   Jose Bee MD   ondansetron (ZOFRAN-ODT) 8 mg disintegrating tablet  4/27/22   Historical Provider, MD   oxyCODONE (ROXICODONE) 5 immediate release tablet Take 0 5 tablets (2 5 mg total) by mouth every 6 (six) hours as needed for moderate pain for up to 10 days Continuation of care Max Daily Amount: 10 mg 8/21/22 8/31/22  Wade Dale PA-C   pantoprazole (PROTONIX) 40 mg tablet take 1 tablet by mouth twice a day 10/11/21   Historical Provider, MD   polyethylene glycol (MIRALAX) 17 g packet Take 17 g by mouth daily 8/21/22   Wade Dale PA-C   pregabalin (LYRICA) 50 mg capsule Take 1 PO daily, take 1 PO additionally directly have HD on HD days 7/25/22   Ashley Burton DO   senna (SENOKOT) 8 6 mg Take 2 tablets (17 2 mg total) by mouth daily at bedtime as needed for constipation 2/21/20   LOIS Garcia   sertraline (ZOLOFT) 50 mg tablet Take 1 5 tablets (75 mg total) by mouth daily 8/21/22   Wade Dale PA-C   sevelamer (RENAGEL) 800 mg tablet Take 2 tablets (1,600 mg total) by mouth 3 (three) times a day with meals 11/13/21   Darryn Bates MD   urea (CARMOL) 40 % APPLY TO AFFECTED AREA TOPICALLY EVERY DAY 2/18/22   Dali Watts DPM   warfarin (COUMADIN) 3 mg tablet Take 3 tablets (9 mg total) by mouth daily Takes 9 mg Monday Sat 6/11/21   LOIS Dior   zolpidem [facility]) 5 mg tablet Take 5 mg by mouth daily at bedtime 3/15/22 7/29/22  Historical Provider, MD     I have reviewed home medications using recent Epic encounter  Allergies: Allergies   Allergen Reactions    Iodinated Diagnostic Agents Itching    Keflex [Cephalexin] Hives    Wound Dressing Adhesive Rash       Social History:  Marital Status: Single   Substance Use History:   Social History     Substance and Sexual Activity   Alcohol Use Never    Alcohol/week: 0 0 standard drinks     Social History     Tobacco Use   Smoking Status Never Smoker   Smokeless Tobacco Never Used     Social History     Substance and Sexual Activity   Drug Use No       Family History:  Family History   Problem Relation Age of Onset    Heart disease Family     Diabetes Family     Heart disease Mother     Cancer Brother         neck    Throat cancer Brother     Ovarian cancer Maternal Aunt 40       Physical Exam:     Vitals:   Blood Pressure: (!) 197/74 (08/30/22 1145)  Pulse: 78 (08/30/22 1145)  Temperature: 97 8 °F (36 6 °C) (08/30/22 1040)  Temp Source: Oral (08/30/22 1040)  Respirations: (!) 26 (08/30/22 1145)  SpO2: 93 % (08/30/22 1145)    Physical Exam  Constitutional:       General: She is not in acute distress  Appearance: She is obese  HENT:      Head: Atraumatic  Cardiovascular:      Rate and Rhythm: Normal rate and regular rhythm  Heart sounds: No murmur heard  No friction rub  Pulmonary:      Effort: Pulmonary effort is normal  No respiratory distress  Breath sounds: Normal breath sounds  No wheezing  Abdominal:      General: Bowel sounds are normal  There is no distension  Palpations: Abdomen is soft  Tenderness: There is no abdominal tenderness  Musculoskeletal:         General: No swelling  Cervical back: Neck supple  Comments: History of right TMA   Skin:     General: Skin is warm and dry  Neurological:      General: No focal deficit present  Mental Status: She is alert and oriented to person, place, and time  Cranial Nerves: No cranial nerve deficit  Sensory: No sensory deficit  Motor: No weakness  Psychiatric:         Mood and Affect: Mood normal           Additional Data:     Lab Results:  Results from last 7 days   Lab Units 08/30/22  1123   WBC Thousand/uL 6 16   HEMOGLOBIN g/dL 8 3*   HEMATOCRIT % 26 6*   PLATELETS Thousands/uL 187   NEUTROS PCT % 70   LYMPHS PCT % 17   MONOS PCT % 9   EOS PCT % 2     Results from last 7 days   Lab Units 08/30/22  1123   SODIUM mmol/L 136   POTASSIUM mmol/L 3 6   CHLORIDE mmol/L 99   CO2 mmol/L 30   BUN mg/dL 14   CREATININE mg/dL 4 35*   ANION GAP mmol/L 7   CALCIUM mg/dL 9 6   ALBUMIN g/dL 3 1*   TOTAL BILIRUBIN mg/dL 0 80   ALK PHOS U/L 102   ALT U/L 17   AST U/L 18   GLUCOSE RANDOM mg/dL 103     Results from last 7 days   Lab Units 08/30/22  1123   INR  1 13     Results from last 7 days   Lab Units 08/30/22  1049   POC GLUCOSE mg/dl 110               Imaging: Reviewed radiology reports from this admission including: CT head and CT leg  CT head without contrast   Final Result by Meredith Lira DO (08/30 1311)   No acute intracranial abnormality  Workstation performed: [id number]         CT lower extremity wo contrast right   Final Result by Christopher Wilkins MD (08/30 1316)      No acute displaced fracture  Mild osteoarthrosis  Possible cystitis  Workstation performed: [id number]             EKG and Other Studies Reviewed on Admission:   · EKG: Normal sinus rhythm, no acute ischemic changes  ** Please Note: This note has been constructed using a voice recognition system   **

## 2022-08-30 NOTE — ASSESSMENT & PLAN NOTE
· History of DVT and PE 6 years ago  · Patient is on Coumadin, her INR is subtherapeutic, seems to be a little bit confused which medications is she taking    Not sure about compliance  · INR 1 13  · Coumadin 10 mg daily

## 2022-08-31 ENCOUNTER — HOME CARE VISIT (OUTPATIENT)
Dept: HOME HEALTH SERVICES | Facility: HOME HEALTHCARE | Age: 57
End: 2022-08-31
Payer: MEDICARE

## 2022-08-31 PROBLEM — R53.83 LETHARGY: Status: ACTIVE | Noted: 2022-08-30

## 2022-08-31 LAB
ANION GAP SERPL CALCULATED.3IONS-SCNC: 4 MMOL/L (ref 4–13)
BUN SERPL-MCNC: 19 MG/DL (ref 5–25)
CALCIUM SERPL-MCNC: 9.1 MG/DL (ref 8.3–10.1)
CHLORIDE SERPL-SCNC: 100 MMOL/L (ref 96–108)
CO2 SERPL-SCNC: 31 MMOL/L (ref 21–32)
CREAT SERPL-MCNC: 6.46 MG/DL (ref 0.6–1.3)
GFR SERPL CREATININE-BSD FRML MDRD: 6 ML/MIN/1.73SQ M
GLUCOSE SERPL-MCNC: 130 MG/DL (ref 65–140)
GLUCOSE SERPL-MCNC: 162 MG/DL (ref 65–140)
GLUCOSE SERPL-MCNC: 178 MG/DL (ref 65–140)
GLUCOSE SERPL-MCNC: 38 MG/DL (ref 65–140)
GLUCOSE SERPL-MCNC: 48 MG/DL (ref 65–140)
GLUCOSE SERPL-MCNC: 58 MG/DL (ref 65–140)
GLUCOSE SERPL-MCNC: 71 MG/DL (ref 65–140)
INR PPP: 1.05 (ref 0.84–1.19)
MAGNESIUM SERPL-MCNC: 2.3 MG/DL (ref 1.6–2.6)
POTASSIUM SERPL-SCNC: 3.8 MMOL/L (ref 3.5–5.3)
PROTHROMBIN TIME: 13.9 SECONDS (ref 11.6–14.5)
SODIUM SERPL-SCNC: 135 MMOL/L (ref 135–147)

## 2022-08-31 PROCEDURE — 99222 1ST HOSP IP/OBS MODERATE 55: CPT | Performed by: INTERNAL MEDICINE

## 2022-08-31 PROCEDURE — 82948 REAGENT STRIP/BLOOD GLUCOSE: CPT

## 2022-08-31 PROCEDURE — 85610 PROTHROMBIN TIME: CPT | Performed by: INTERNAL MEDICINE

## 2022-08-31 PROCEDURE — G0180 MD CERTIFICATION HHA PATIENT: HCPCS | Performed by: INTERNAL MEDICINE

## 2022-08-31 PROCEDURE — 83735 ASSAY OF MAGNESIUM: CPT | Performed by: INTERNAL MEDICINE

## 2022-08-31 PROCEDURE — 97167 OT EVAL HIGH COMPLEX 60 MIN: CPT

## 2022-08-31 PROCEDURE — 97163 PT EVAL HIGH COMPLEX 45 MIN: CPT

## 2022-08-31 PROCEDURE — 99232 SBSQ HOSP IP/OBS MODERATE 35: CPT | Performed by: PHYSICIAN ASSISTANT

## 2022-08-31 PROCEDURE — 80048 BASIC METABOLIC PNL TOTAL CA: CPT | Performed by: INTERNAL MEDICINE

## 2022-08-31 RX ORDER — DOCUSATE SODIUM 100 MG/1
100 CAPSULE, LIQUID FILLED ORAL 2 TIMES DAILY
Status: DISCONTINUED | OUTPATIENT
Start: 2022-08-31 | End: 2022-09-07 | Stop reason: HOSPADM

## 2022-08-31 RX ORDER — CALCITRIOL 0.25 UG/1
0.25 CAPSULE, LIQUID FILLED ORAL 3 TIMES WEEKLY
Status: DISCONTINUED | OUTPATIENT
Start: 2022-09-01 | End: 2022-09-07 | Stop reason: HOSPADM

## 2022-08-31 RX ORDER — OXYCODONE HYDROCHLORIDE 5 MG/1
2.5 TABLET ORAL EVERY 8 HOURS PRN
Status: DISCONTINUED | OUTPATIENT
Start: 2022-08-31 | End: 2022-09-02

## 2022-08-31 RX ORDER — DEXTROSE MONOHYDRATE 25 G/50ML
INJECTION, SOLUTION INTRAVENOUS
Status: COMPLETED
Start: 2022-08-31 | End: 2022-08-31

## 2022-08-31 RX ORDER — POLYETHYLENE GLYCOL 3350 17 G/17G
17 POWDER, FOR SOLUTION ORAL DAILY
Status: DISCONTINUED | OUTPATIENT
Start: 2022-08-31 | End: 2022-09-07 | Stop reason: HOSPADM

## 2022-08-31 RX ORDER — METHOCARBAMOL 500 MG/1
500 TABLET, FILM COATED ORAL EVERY 6 HOURS PRN
Status: DISCONTINUED | OUTPATIENT
Start: 2022-08-31 | End: 2022-09-06

## 2022-08-31 RX ORDER — INSULIN LISPRO 100 [IU]/ML
1-5 INJECTION, SOLUTION INTRAVENOUS; SUBCUTANEOUS
Status: DISCONTINUED | OUTPATIENT
Start: 2022-08-31 | End: 2022-09-01

## 2022-08-31 RX ADMIN — PANTOPRAZOLE SODIUM 40 MG: 40 TABLET, DELAYED RELEASE ORAL at 17:32

## 2022-08-31 RX ADMIN — GABAPENTIN 100 MG: 100 CAPSULE ORAL at 17:32

## 2022-08-31 RX ADMIN — POLYETHYLENE GLYCOL 3350 17 G: 17 POWDER, FOR SOLUTION ORAL at 17:40

## 2022-08-31 RX ADMIN — DOCUSATE SODIUM 100 MG: 100 CAPSULE, LIQUID FILLED ORAL at 17:33

## 2022-08-31 RX ADMIN — SERTRALINE HYDROCHLORIDE 75 MG: 50 TABLET ORAL at 08:51

## 2022-08-31 RX ADMIN — CARVEDILOL 25 MG: 25 TABLET, FILM COATED ORAL at 17:33

## 2022-08-31 RX ADMIN — Medication 1 CAPSULE: at 17:32

## 2022-08-31 RX ADMIN — SEVELAMER HYDROCHLORIDE 1600 MG: 800 TABLET ORAL at 08:50

## 2022-08-31 RX ADMIN — CARVEDILOL 25 MG: 25 TABLET, FILM COATED ORAL at 08:50

## 2022-08-31 RX ADMIN — ALPRAZOLAM 0.25 MG: 0.25 TABLET ORAL at 08:50

## 2022-08-31 RX ADMIN — ACETAMINOPHEN 975 MG: 325 TABLET ORAL at 14:05

## 2022-08-31 RX ADMIN — PANTOPRAZOLE SODIUM 40 MG: 40 TABLET, DELAYED RELEASE ORAL at 08:50

## 2022-08-31 RX ADMIN — ACETAMINOPHEN 975 MG: 325 TABLET ORAL at 07:04

## 2022-08-31 RX ADMIN — HEPARIN SODIUM 5000 UNITS: 5000 INJECTION INTRAVENOUS; SUBCUTANEOUS at 07:03

## 2022-08-31 RX ADMIN — SEVELAMER HYDROCHLORIDE 1600 MG: 800 TABLET ORAL at 17:34

## 2022-08-31 RX ADMIN — HEPARIN SODIUM 5000 UNITS: 5000 INJECTION INTRAVENOUS; SUBCUTANEOUS at 21:38

## 2022-08-31 RX ADMIN — LEVOTHYROXINE SODIUM 50 MCG: 50 TABLET ORAL at 07:04

## 2022-08-31 RX ADMIN — DEXTROSE MONOHYDRATE 25 ML: 25 INJECTION, SOLUTION INTRAVENOUS at 07:42

## 2022-08-31 RX ADMIN — SEVELAMER HYDROCHLORIDE 1600 MG: 800 TABLET ORAL at 12:17

## 2022-08-31 RX ADMIN — GABAPENTIN 100 MG: 100 CAPSULE ORAL at 08:53

## 2022-08-31 RX ADMIN — METHOCARBAMOL TABLETS 500 MG: 500 TABLET, COATED ORAL at 07:04

## 2022-08-31 RX ADMIN — GABAPENTIN 100 MG: 100 CAPSULE ORAL at 21:38

## 2022-08-31 RX ADMIN — PREGABALIN 50 MG: 50 CAPSULE ORAL at 08:51

## 2022-08-31 RX ADMIN — METHOCARBAMOL TABLETS 500 MG: 500 TABLET, COATED ORAL at 12:17

## 2022-08-31 RX ADMIN — HEPARIN SODIUM 5000 UNITS: 5000 INJECTION INTRAVENOUS; SUBCUTANEOUS at 14:06

## 2022-08-31 RX ADMIN — MELATONIN 3 MG: at 21:38

## 2022-08-31 RX ADMIN — WARFARIN SODIUM 9 MG: 7.5 TABLET ORAL at 17:32

## 2022-08-31 RX ADMIN — ACETAMINOPHEN 975 MG: 325 TABLET ORAL at 21:38

## 2022-08-31 RX ADMIN — OXYCODONE HYDROCHLORIDE 5 MG: 5 TABLET ORAL at 07:05

## 2022-08-31 RX ADMIN — ATORVASTATIN CALCIUM 20 MG: 20 TABLET, FILM COATED ORAL at 17:33

## 2022-08-31 RX ADMIN — LORATADINE 5 MG: 10 TABLET ORAL at 08:50

## 2022-08-31 RX ADMIN — DEXTROSE MONOHYDRATE: 25 INJECTION, SOLUTION INTRAVENOUS at 11:45

## 2022-08-31 NOTE — ASSESSMENT & PLAN NOTE
Lab Results   Component Value Date    HGBA1C 9 4 (H) 07/09/2022       Recent Labs     08/31/22  0657 08/31/22  0731 08/31/22  1141 08/31/22  1213   POCGLU 58* 48* 38* 162*       Blood Sugar Average: Last 72 hrs:  (P) 91 [id number]   · On Lantus 12 units HS and NovoLog sliding scale at home   · Noted to have persistent hypoglycemia while hospitalized today  · Will d/c lantus, continue PRN SSi coverage for now  · Endocrinology consult  · Encourage PO intake   · QID glucose checks  · Received 2 amps D50 today, last glucose check at 160s  · Diabetic diet  · Could be contributing to patient's overall presentation

## 2022-08-31 NOTE — CASE MANAGEMENT
Case Management Assessment & Discharge Planning Note    Patient name Nikky Mills  Location 2 210/2 641-64 MRN [id number]  : 1965 Date 2022       Current Admission Date: 2022  Current Admission Diagnosis:Decreased level of consciousness   Patient Active Problem List    Diagnosis Date Noted    Decreased level of consciousness 2022    Elevated troponin 2022    Intractable back pain 2022    Problem with vascular access 2022    Concern for Osteomyelitis/Discitis of lumbar region 2022    Fall 2022    Hypoxia 2022    COVID-19 2022    Right knee pain 2022    Constipation 2022    Pruritus 2021    Postop check 2021    Sigmoid diverticulitis 2021    Pneumonia 2021    Chronic anticoagulation 2021    Arteriovenous fistula for hemodialysis in place, primary ([facility] ) 2021    Healthcare-associated pneumonia 2021    Right foot pain 2021    A-V fistula ([facility] ) 2021    Chronic pain syndrome 2021    Bilateral primary osteoarthritis of knee 2021    Bilateral patellofemoral syndrome 2021    Tinea unguium 2020    S/P foot surgery, right 2020    History of claustrophobia 2020    Morbid obesity 2020    Hyperlipidemia 2020    Diabetic polyneuropathy associated with type 2 diabetes mellitus ([facility] ) 2020    Encounter for diabetic foot exam ([facility] ) 2020    Corns and callosities 2020    History of transmetatarsal amputation of right foot ([facility] ) 2020    Flu-like symptoms 2020    Lumbar radiculopathy 2020    Low back pain with sciatica 2020    Hemodialysis-associated hypotension 2019    Hyponatremia 2019    Parenchymal renal hypertension 2019    Candidiasis of breast 2019    Hyperkalemia 2019    Anemia of chronic disease 2019    Disruption of internal surgical wound 08/26/2019    Dyspnea 05/17/2019    Secondary hyperparathyroidism of renal origin ([facility] ) 04/27/2019    Nausea and vomiting 04/26/2019    Meningioma ([facility] ) 04/18/2019    Concussion without loss of consciousness 03/15/2019    Colon cancer screening 03/05/2019    Gastroesophageal reflux disease 03/05/2019    Primary osteoarthritis of right knee 10/25/2018    Hemodialysis status ([place] [address] ) 10/23/2018    Knee pain 10/22/2018    Anxiety disorder 04/06/2017    Acquired hypothyroidism 07/22/2016    Glaucoma 07/01/2016    ESRD on hemodialysis 07/01/2016    Abnormal uterine bleeding 02/15/2016    History of venous thromboembolism 02/14/2016    Pulmonary embolus ([place] [address] ) 10/30/2015    Cervical dysplasia 05/03/2015    Chronic endometritis 10/17/2014    Tinea pedis 10/02/2014    Benign neoplasm of skin 09/25/2014    Type 2 diabetes mellitus ([place] [address] ) 09/04/2014    Phlebitis and thrombophlebitis of superficial vessels of lower extremities 04/10/2014    Limb pain 11/25/2013    Mononeuritis of upper limb, unspecified laterality 11/25/2013    Legal blindness, as defined in [facility] [place] [facility] 95/03/7415    Complication from renal dialysis device 04/22/2013    Essential hypertension 10/15/2012    Cardiomyopathy ([place] [address] ) 09/26/2012    Esophageal reflux 08/20/2012    Allergic rhinitis 08/20/2012      LOS (days): 0  Geometric Mean LOS (GMLOS) (days):   Days to GMLOS:     OBJECTIVE:              Current admission status: Observation       Preferred Pharmacy:   [facility] #[id number] RALPH King [address] Pending sale to [facility]  [address] [place] [address]  Phone: [phone] Fax: [address] [facility], [address] [place] [address]  Phone: [phone] Fax: [phone]    Primary Care Provider: Adair Desai MD    Primary Insurance: MEDICARE  Secondary Insurance: [address] [place] [address]    ASSESSMENT:  Active [address], [address] Representative - Significant Other   Primary Phone: [phone] [facility]  Home Phone: [phone]                 Readmission Root Cause  30 Day Readmission: No    Patient Information  Admitted from[de-identified] Home  Mental Status: Alert (Pt frequently dosing off during assessment)  During Assessment patient was accompanied by: Not accompanied during assessment  Assessment information provided by[de-identified] Patient  Primary Caregiver: Self  Support Systems: Spouse/significant other  [place] of Residence: [address] do you live in?: [place], [address] entry access options  Select all that apply : Stairs  Number of steps to enter home : One Flight  Type of Current Residence: 3 story home  Upon entering residence, is there a bedroom on the main floor (no further steps)?: No (Pt states she primarily sleeps in the living room on the first floor   She does not climb the stairs )  A bedroom is located on the following floor levels of residence (select all that apply):: 2nd Floor  Upon entering residence, is there a bathroom on the main floor (no further steps)?: Yes  Number of steps to 2nd floor from main floor: One Flight  In the last 12 months, was there a time when you were not able to pay the mortgage or rent on time?: No  In the last 12 months, was there a time when you did not have a steady place to sleep or slept in a shelter (including now)?: No  Homeless/housing insecurity resource given?: N/A  Living Arrangements: Lives w/ Spouse/significant other    Activities of Daily Living Prior to Admission  Functional Status: Independent  Completes ADLs independently?: Yes  Ambulates independently?: Yes  Does patient use assisted devices?: Yes  Assisted Devices (DME) used: Brandon Castillo Shower Chair  Does patient currently own DME?: Yes  What DME does the patient currently own?: Norval Oh  Does patient have a history of Outpatient Therapy (PT/OT)?: Yes  Does the patient have a history of Short-Term Rehab?: No  Does patient have a history of HHC?: Yes  Does patient currently have [facility]?: Yes    Current Home Health Care  Type of Current Home Care Services: Nurse visit, Home PT, [address][de-identified] [facility] Provider[de-identified] PCP    Patient Information Continued  Income Source: Pension/assisted  Does patient have prescription coverage?: Yes  Within the past 12 months, you worried that your food would run out before you got the money to buy more : Never true  Within the past 12 months, the food you bought just didn't last and you didn't have money to get more : Never true  Food insecurity resource given?: N/A  Does patient receive dialysis treatments?: Yes (Abhi CARDONA)  Does patient have a history of substance abuse?: No  Does patient have a history of Mental Health Diagnosis?: No         Means of Transportation  Means of Transport to Appts[de-identified] Family transport  In the past 12 months, has lack of transportation kept you from medical appointments or from getting medications?: No  In the past 12 months, has lack of transportation kept you from meetings, work, or from getting things needed for daily living?: No  Was application for public transport provided?: N/A        DISCHARGE DETAILS:    Discharge planning discussed with[de-identified] Patient at bedside  Freedom of Choice: Yes     CM contacted family/caregiver?: No- see comments (declined)  Were Treatment Team discharge recommendations reviewed with patient/caregiver?: Yes  Did patient/caregiver verbalize understanding of patient care needs?: Yes  Were patient/caregiver advised of the risks associated with not following Treatment Team discharge recommendations?: Yes    Contacts  Patient Contacts: Self, Rosalva Segura  Contact Method:  In Person  Reason/Outcome: Discharge [address]         Is the patient interested in [facility] at discharge?: Yes  [address] requested[de-identified] Occupational Therapy, Nursing, Physical Therapy, Medical Social Work  Home Health Agency Name[de-identified] [facility] Provider[de-identified] PCP  Home Health Services Needed[de-identified] Urinary Incontinence Catheter Management, Evaluate Functional Status and Safety, Gait/ADL Training  Homebound Criteria Met[de-identified] Requires the Assistance of Another Person for Safe Ambulation or to Leave the Home, Uses an Assist Device (i e  cane, walker, etc)  Supporting Clincal Findings[de-identified] Limited Endurance, Fatigues Easliy in [employer]         Other Referral/Resources/Interventions Provided:  Interventions: [facility]  Referral Comments: Pt states she is current with [facility] and would like to resume services upon discharge -- Referral placed via [facility] for [facility] and accepted by the agency  F2F completed and information added to AVS          Treatment Team Recommendation: Home with [address]  Discharge Destination Plan[de-identified] Home with [address]                                         Additional Comments: Pt currently pending PT/OT evaluations to determine therapy recommendations -- CM will follow and plan for discharge accordingly

## 2022-08-31 NOTE — CONSULTS
Consultation - Noa Jackson 56 y o  female MRN: [id number]    Unit/Bed#: CW2 210-01 Encounter: [id number]      Assessment/Plan     Assessment:  Noa Jackson is a 56y o  year old female with past medical history type 2 diabetes mellitus for 28 years with complications of  retinopathy, nephropathy, neuropathy, peripheral artery disease, end-stage renal disease, hypothyroidism who was brought to the hospital with symptoms of unresponsiveness while she was at dialysis center, admitted to the hospital for lethargy and recurrent hypoglycemia  Endocrine consulted for the management hypoglycemia in patient with type 2 diabetes mellitus    Plan:  Hypoglycemia in patient with type 2 diabetes mellitus  -HbA1c 9 4 in July 2022  -home regimen:  Lantus 28 units at bedtime, lispro 8-12 units based on blood glucose  Current regimen Lantus 10 units and algorithm 2 correctional scale    Recommendations:  -patient is likely to be hypoglycemic due to insulin stacking  -will discontinue Lantus at this time as blood glucose continues to be down trending despite not receiving insulin today and eating meals    -will change the correctional scale to lower algorithm of 1  - continue to monitor blood glucose closely and will make adjustments as appropriate        Hypothyroidism  -continue with home dose of levothyroxine 50 mcg    CC: Recurrent hypoglycemia    History of Present Illness     HPI: Noa Jackson is a 56y o  year old female with past medical history type 2 diabetes mellitus for 28 years with complications of  retinopathy, nephropathy, neuropathy, peripheral artery disease, end-stage renal disease, hypothyroidism who was brought to the hospital with symptoms of unresponsiveness while she was at dialysis center, admitted to the hospital for lethargy and recurrent hypoglycemia  She is on insulin at home  She denies any polyuria, polydipsia, nocturia and blurry vision    She denies neuropathy, nephropathy and retinopathy but does admit to heart attack and stroke  Denies having endocrinologist for management of her type 2 diabetes  Patient reports that she takes about 28 units of Lantus at bedtime, takes lispro 8-12 units with meals, uses sliding scale, however was not able to define the sliding scale  Patient reports that prior to to her dialysis day, she had a smaller dinner than usual, reports that she took Lantus 24 units, lispro 10 units with lunch and dinner and lispro 1 unit with breakfast the day before her dialysis session  Patient reports that she does have hypoglycemic episodes at home where her blood glucose is in 40s, reports that this incidences happen about 2 times per month  Denies experiencing any hypoglycemic symptoms  While in the hospital patient was noted to have blood glucose of 58 this morning, was given 1 amp of dextrose, subsequently had blood glucose as low as 38, was given juice and dextrose and blood glucose is now at 162  Endocrine consulted for the management hypoglycemia in patient with type 2 diabetes mellitus    Inpatient consult to Endocrinology  Consult performed by: Emily Guzman MD  Consult ordered by: Niecy Pino PA-C          Review of Systems   Constitutional: Positive for fatigue  Negative for activity change and appetite change  HENT: Negative for congestion and sore throat  Eyes: Negative for redness and visual disturbance  Respiratory: Negative for cough and shortness of breath  Cardiovascular: Negative for palpitations and leg swelling  Gastrointestinal: Negative for abdominal pain, constipation, diarrhea, nausea and vomiting  Endocrine: Negative for cold intolerance, heat intolerance, polydipsia, polyphagia and polyuria  Genitourinary: Negative for difficulty urinating and dysuria  Musculoskeletal: Positive for back pain  Negative for gait problem and neck pain  Skin: Negative for color change  Neurological: Negative for dizziness and syncope  Psychiatric/Behavioral: Negative for agitation and behavioral problems  All other systems reviewed and are negative        Historical Information   Past Medical History:   Diagnosis Date    Abnormal uterine bleeding (AUB)     Anemia     Anxiety     Arthritis     Chronic kidney disease     Claustrophobia     Diabetes mellitus ([place] [address] )     Disease of thyroid gland     DVT (deep venous thrombosis) (HCC)     End stage kidney disease (HCC)     Foot ulcer due to secondary DM ([facility] )     Hemodialysis patient ([place] [address] )     Tues, Thurs, Sat    Hypertension     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[place] [address] )     right eye    Morbid obesity ([place] [address] )     Osteomyelitis of fifth toe of right foot (HCC)     Panic attacks     Pulmonary embolism (HCC)     Reflux esophagitis     Thrombophlebitis of saphenous vein     Warfarin anticoagulation      Past Surgical History:   Procedure Laterality Date    AMPUTATION      r 4th toe    ARTERIOVENOUS GRAFT PLACEMENT      CATARACT EXTRACTION Bilateral     CERVICAL BIOPSY  W/ LOOP ELECTRODE EXCISION       SECTION      DIALYSIS FISTULA CREATION      DILATION AND CURETTAGE OF UTERUS      ENDOMETRIAL ABLATION W/ NOVASURE      EYE SURGERY      HYSTERECTOMY      @ age 57 (complete)    IR AV FISTULAGRAM/GRAFTOGRAM  10/11/2019    IR AV FISTULAGRAM/GRAFTOGRAM  2020    IR AV FISTULAGRAM/GRAFTOGRAM  2020    IR NON-TUNNELED CENTRAL LINE PLACEMENT  2021    IR NON-TUNNELED CENTRAL LINE PLACEMENT  10/4/2021    OOPHORECTOMY Bilateral     @ age 57    PERICARDIUM SURGERY      NH AMPUTATION FOOT,TRANSMETATARSAL Right 2021    Procedure: AMPUTATION TRANSMETATARSAL (TMA);  Surgeon: Teja Knowles DPM;  Location[facility];  Service: Podiatry    NH AMPUTATION TOE,MT-P JT Right 2020    Procedure: AMPUTATION TOE- 5th;  Surgeon: Teja Knowles DPM;  Location: [facility];  Service: Podiatry    NH COLONOSCOPY FLX DX W/COLLJ SPEC WHEN PFRMD N/A 3/13/2019    Procedure: COLONOSCOPY;  Surgeon: Roberta Alejandro MD;  Location: [facility]; Service: Gastroenterology    MT ESOPHAGOGASTRODUODENOSCOPY TRANSORAL DIAGNOSTIC N/A 3/13/2019    Procedure: ESOPHAGOGASTRODUODENOSCOPY (EGD); Surgeon: Roberta Alejandro MD;  Location: [facility];   Service: Gastroenterology    MT LAPAROSCOPY W TOT HYSTERECT UTERUS 250 GRAM OR LESS N/A 2/16/2016    Procedure: HYSTERECTOMY LAPAROSCOPIC TOTAL ([address]), with bilateral salpingectomy;  Surgeon: Jovanni Hernandez DO;  Location: [facility];  Service: Gynecology    THROMBECTOMY / ARTERIOVENOUS GRAFT REVISION      TOE SURGERY Right     removal of the 4th toe    WOUND DEBRIDEMENT Right 10/8/2021    Procedure: R 1st MTPJ washout ;  Surgeon: Noble Krishnan DPM;  Location: [facility];  Service: Podiatry     Social History   Social History     Substance and Sexual Activity   Alcohol Use Never    Alcohol/week: 0 0 standard drinks     Social History     Substance and Sexual Activity   Drug Use No     Social History     Tobacco Use   Smoking Status Never Smoker   Smokeless Tobacco Never Used     Family History:   Family History   Problem Relation Age of Onset    Heart disease Family     Diabetes Family     Heart disease Mother     Cancer Brother         neck    Throat cancer Brother     Ovarian cancer Maternal Aunt 40       Meds/Allergies   Current Facility-Administered Medications   Medication Dose Route Frequency Provider Last Rate Last Admin    acetaminophen (TYLENOL) tablet 650 mg  650 mg Oral Q6H PRN Allen Guthrie MD   650 mg at 08/30/22 2303    acetaminophen (TYLENOL) tablet 975 mg  975 mg Oral Q8H [address] Allen Guthrie MD   975 mg at 08/31/22 1405    albuterol (PROVENTIL HFA,VENTOLIN HFA) inhaler 2 puff  2 puff Inhalation Q6H PRN Allen Guthrie MD        ALPRAZolam Gino Servin) tablet 0 25 mg  0 25 mg Oral BID PRN Allen Guthrie MD   0 25 mg at 08/31/22 0850    atorvastatin (LIPITOR) tablet 20 mg  20 mg Oral Daily With [employer] Lavinia Toth MD   20 mg at 08/30/22 1610    b complex-vitamin C-folic acid (NEPHROCAPS) capsule 1 capsule  1 capsule Oral Daily With Romona Osgood, MD   1 capsule at 08/30/22 1610    [START ON 9/1/2022] calcitriol (ROCALTROL) capsule 0 25 mcg  0 25 mcg Oral Once per day on Tue Thu Sat LOIS [place]        carvedilol (COREG) tablet 25 mg  25 mg Oral BID Lavinia Toth MD   25 mg at 08/31/22 0850    docusate sodium (COLACE) capsule 100 mg  100 mg Oral BID Abbie Wilcox PA-C        gabapentin (NEURONTIN) capsule 100 mg  100 mg Oral TID Lavinia Toth MD   100 mg at 08/31/22 0853    heparin (porcine) subcutaneous injection 5,000 Units  5,000 Units Subcutaneous [facility] Lavinia Toth MD   5,000 Units at 08/31/22 1406    hydrALAZINE (APRESOLINE) injection 10 mg  10 mg Intravenous Q6H PRN Lavinia Toth MD        hydrOXYzine HCL (ATARAX) tablet 25 mg  25 mg Oral Q6H PRN Lavinia Toth MD   25 mg at 08/30/22 1834    insulin glargine (LANTUS) subcutaneous injection 10 Units 0 1 mL  10 Units Subcutaneous HS Lavinia Toth MD   10 Units at 08/30/22 2053    insulin lispro (HumaLOG) 100 units/mL subcutaneous injection 2-12 Units  2-12 Units Subcutaneous TID AC Lavinia Toth MD        [START ON 9/1/2022] iron sucrose (VENOFER) 50 mg in sodium chloride 0 9 % 100 mL IVPB  50 mg Intravenous Weekly LOIS Harper        levothyroxine tablet 50 mcg  50 mcg Oral Early Morning Lavinia Toth MD   50 mcg at 08/31/22 0704    lidocaine (LIDODERM) 5 % patch 1 patch  1 patch Topical Daily Lavinia Toth MD   1 patch at 08/30/22 1612    loratadine (CLARITIN) tablet 5 mg  5 mg Oral Daily Lottie Oliveira MD   5 mg at 08/31/22 0850    melatonin tablet 3 mg  3 mg Oral HS Lavinia Toth MD   3 mg at 08/30/22 2053    menthol-methyl salicylate (BENGAY) 09-17 % cream   Apply externally 4x Daily PRN Lavinia Toth MD        methocarbamol (ROBAXIN) tablet 500 mg  500 mg Oral Q6H PRN Moisés Suarez PA-C        [facility]dansetron [facility]) injection 4 mg  4 mg Intravenous Q6H PRN Jean Paul Kelly MD        oxyCODONE (ROXICODONE) IR tablet 2 5 mg  2 5 mg Oral Q8H PRN Moisés Suarez PA-C        pantoprazole (PROTONIX) EC tablet 40 mg  40 mg Oral BID Jean Paul Kelly MD   40 mg at 08/31/22 0850    polyethylene glycol (MIRALAX) packet 17 g  17 g Oral Daily Moisés Suarez PA-C        pregabalin (LYRICA) capsule 50 mg  50 mg Oral Daily Jean Paul Kelly MD   50 mg at 08/31/22 0851    senna (SENOKOT) tablet 17 2 mg  2 tablet Oral HS PRN Jean Paul Kelly MD        sertraline (ZOLOFT) tablet 75 mg  75 mg Oral Daily Lottie Oliveira MD   75 mg at 08/31/22 0851    sevelamer (RENAGEL) tablet 1,600 mg  1,600 mg Oral TID With Meals Jean Paul Kelly MD   1,600 mg at 08/31/22 1217    warfarin (COUMADIN) tablet 9 mg  9 mg Oral Daily (warfarin) Moisés Suarez PA-C         Allergies   Allergen Reactions    Iodinated Diagnostic Agents Itching    Keflex [Cephalexin] Hives    Wound Dressing Adhesive Rash       Objective   Vitals: Blood pressure 131/58, pulse 70, temperature 98 1 °F (36 7 °C), resp  rate 20, last menstrual period 02/12/2016, SpO2 92 %, not currently breastfeeding  Intake/Output Summary (Last 24 hours) at 8/31/2022 1625  Last data filed at 8/31/2022 1100  Gross per 24 hour   Intake 1450 ml   Output --   Net 1450 ml     Invasive Devices  Report    Peripheral Intravenous Line  Duration           Peripheral IV 08/30/22 Right Antecubital 1 day          Line  Duration           Hemodialysis AV Fistula 02/20/18 Left Forearm 1652 days                Physical Exam  Constitutional:       Appearance: Normal appearance  HENT:      Head: Normocephalic and atraumatic  Cardiovascular:      Rate and Rhythm: Normal rate and regular rhythm  Pulses: Normal pulses  Heart sounds: Normal heart sounds  No murmur heard  No gallop     Pulmonary:      Effort: Pulmonary effort is normal  Breath sounds: Normal breath sounds  No wheezing or rales  Abdominal:      Tenderness: There is no abdominal tenderness  Musculoskeletal:         General: No swelling  Cervical back: Normal range of motion  No tenderness  Right lower leg: No edema  Left lower leg: No edema  Comments: Amputation of left toes   Lymphadenopathy:      Cervical: No cervical adenopathy  Skin:     General: Skin is warm  Neurological:      Mental Status: She is alert and oriented to person, place, and time  Mental status is at baseline  Psychiatric:         Mood and Affect: Mood normal          Behavior: Behavior normal          The history was obtained from the review of the chart, patient  Lab Results:       Lab Results   Component Value Date    WBC 6 16 08/30/2022    HGB 8 3 (L) 08/30/2022    HCT 26 6 (L) 08/30/2022     (H) 08/30/2022     08/30/2022     Lab Results   Component Value Date/Time    BUN 19 08/31/2022 10:22 AM    BUN 64 (H) 10/11/2015 06:08 AM     10/11/2015 06:08 AM    K 3 8 08/31/2022 10:22 AM    K 4 7 10/11/2015 06:08 AM     08/31/2022 10:22 AM    CL 94 (L) 10/11/2015 06:08 AM    CO2 31 08/31/2022 10:22 AM    CO2 33 (H) 07/09/2022 07:32 PM    CREATININE 6 46 (H) 08/31/2022 10:22 AM    CREATININE 6 68 (H) 10/11/2015 06:08 AM    AST 18 08/30/2022 11:23 AM    AST 5 09/29/2015 02:00 AM    ALT 17 08/30/2022 11:23 AM    ALT 13 09/29/2015 02:00 AM    ALB 3 1 (L) 08/30/2022 11:23 AM    ALB 3 1 (L) 09/29/2015 02:00 AM     No results for input(s): CHOL, HDL, LDL, TRIG, VLDL in the last 72 hours    No results found for: Angel Luis Mandujano  POC Glucose   Date Value   08/31/2022 162 mg/dl (H)   08/31/2022 38 mg/dl (LL)   08/31/2022 48 mg/dl (L)   08/31/2022 58 mg/dl (L)   08/30/2022 129 mg/dl   08/30/2022 98 mg/dl   08/30/2022 110 mg/dl   08/21/2022 126 mg/dl   08/21/2022 192 mg/dl (H)   08/20/2022 91 mg/dl   07/20/2015 639 mg/dL (HH)       Imaging Studies: I have personally reviewed pertinent reports  Portions of the record may have been created with voice recognition software

## 2022-08-31 NOTE — PLAN OF CARE
Problem: PHYSICAL THERAPY ADULT  Goal: Performs mobility at highest level of function for planned discharge setting  See evaluation for individualized goals  Description: Treatment/Interventions: Functional transfer training, LE strengthening/ROM, Elevations, Therapeutic exercise, Endurance training, Equipment eval/education, Bed mobility, Gait training, Spoke to case management, Spoke to nursing, OT  Equipment Recommended: Gurinder Pathak (pt already owns)       See flowsheet documentation for full assessment, interventions and recommendations  Note: Prognosis: Good  Problem List: Decreased strength, Decreased endurance, Impaired balance, Decreased mobility, Obesity, Pain  Assessment: Pt is a 56 y o  female seen for PT evaluation s/p admit to [facility] on 8/30/2022  Pt was admitted with a primary dx of: decreased level of consciousness  PT now consulted for assessment of mobility and d/c needs  Pt with Activity as tolerated, PT evaluation orders  Pts current comorbidities effecting treatment include: anemia, anxiety, arthritis, CKD, DM, hemodialysis patient, HTN, 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, morbid obesity, R TMA, panic attacks, PE  Pts current clinical presentation is Unstable/ Unpredictable (high complexity) due to Ongoing medical management for primary dx, Decreased activity tolerance compared to baseline, Fall risk, Increased assistance needed from caregiver at current time, Increased O2 via NC from pts baseline, Trending lab values  Prior to admission, pt was living with her boyfriend and son in a [facility] with 2 BARBARA, stays on 1st floor  Pt reports being I with ADLs and using RW for mobility  Upon evaluation, pt currently is requiring Jules for bed mobility; Jules for transfers; and Jules for ambulation 10 ft x 2 w/ RW   Pt presents at PT eval functioning below baseline and currently w/ overall mobility deficits 2* to: BLE weakness, impaired balance, decreased endurance, gait deviations, pain, decreased activity tolerance compared to baseline, decreased functional mobility tolerance compared to baseline, fall risk  Pt currently at a fall risk 2* to impairments listed above  Pt will continue to benefit from skilled acute PT interventions to address stated impairments; to maximize functional mobility; for ongoing pt/ family training; and DME needs  At conclusion of PT session pt returned back in chair, chair alarm engaged and RN notified of session findings/recommendations with phone and call bell within reach  Pt denies any further questions at this time  The patient's AM-PAC Basic Mobility Inpatient Short Form Raw Score is 16  A Raw score of less than or equal to 16 suggests the patient may benefit from discharge to post-acute rehabilitation services  Please also refer to the recommendation of the Physical Therapist for safe discharge planning  Recommend rehab (vs HHPT pending progress) upon hospital D/C  PT Discharge Recommendation: Post acute rehabilitation services (vs HHPT pending progress)    See flowsheet documentation for full assessment

## 2022-08-31 NOTE — ASSESSMENT & PLAN NOTE
· Patient was in her usual state of health, after finishing dialysis on day of admission when she had an episode of decreased level of consciousness  Patient states she was able to hear nurses calling her name but she could not answer  Per EMS no seizure-like activities and shortly she was back at her baseline mentation  She had a sharp right-sided back pain which she has been dealing with since last year  Per ED, it was not reported that she was hypotensive during this episode  · Unclear cause  ?  Transient hypotension which she had in the past during dialysis  Less likely seizure, no seizure-like activities reported per facility, and shortly she was back at her baseline mentation    Possibly pain related vs  Related to hypoglycemia   · Nonfocal neurologic exam  · CT head negative for acute pathology  · Neuro checks Q 4 for 24 hours  · Monitor on telemetry, however pt has been refusing and taking leads off, no events noted   · Pt with persistent hypoglycemia here, endocrinology consulted, lantus discontinued, SSI coverage PRN  · Decrease Prn oxycodone and d/c scheduled robaxin, adjust additional sedating medications

## 2022-08-31 NOTE — ASSESSMENT & PLAN NOTE
· Previous provider reviewed note from neuro surgery 08/04/2022  · Low back pain probably/likely facet joint related  Multiple admissions and ED visits for right-sided back pain that radiates to her legs, more on the right side  Prior MRI demonstrated moderately advanced degenerative lumbar spine disease at multi levels  L4-5 disc herniation more on the right with moderate central canal stenosis  MRI with questionable L4-L5 osteomyelitis/diskitis per neuro surgery and ID highly unlikely  Underwent bilateral L3, L4-L5 dorsal ramus block at [facility] in May 2022 with no improvement in symptoms  · Due to recent fall prior to her previous admission at the beginning of August CT of the right leg was ordered in the ED  No acute displaced fracture, mild osteoarthrosis  Possible cystitis   · Due to possible cystitis on CT scan, UA ordered and not yet obtained  Patient makes minimal urine  Denies dysuria    · Conservative measures with aqua k pad, bengay Lyrica  · Will decrease PRN oxycodone to 2 5 mg and switch scheduled robaxin to PRN in setting of intermittent lethargy while inpatient   · PT/OT recommending STR which patient is in agreement with

## 2022-08-31 NOTE — PROGRESS NOTES
Pt had BG of 48 after drinking orange juice; SLIM made aware  Pushed 25mL dextrose via peripheral IV  Patient remains asymptomatic  Will continue to monitor

## 2022-08-31 NOTE — CASE COMMUNICATION
Per OT evaluation pt will benefit from skilled OT services 2wk4 with focus on UE strengthening, fall prevention strategies, DME/AE recommendations/training, LHAE training, and ADL retraining to achieve pt goals of increased independence and less reliance on family to care for her  This information is read only  No response required  Thank you

## 2022-08-31 NOTE — OCCUPATIONAL THERAPY NOTE
Occupational Therapy Evaluation     Patient Name: Cam Meeks  FMRTO'P Date: 2022  Problem List  Principal Problem:    Decreased level of consciousness  Active Problems:    Essential hypertension    ESRD on hemodialysis    Anxiety disorder    Esophageal reflux    Anemia of chronic disease    Lumbar radiculopathy    Allergic rhinitis    Type 2 diabetes mellitus (HCC)    Acquired hypothyroidism    Pulmonary embolus (HCC)    Chronic pain syndrome    Chronic anticoagulation    Constipation    Elevated troponin    Past Medical History  Past Medical History:   Diagnosis Date    Abnormal uterine bleeding (AUB)     Anemia     Anxiety     Arthritis     Chronic kidney disease     Claustrophobia     Diabetes mellitus ([facility] )     Disease of thyroid gland     DVT (deep venous thrombosis) ([facility])     End stage kidney disease (HCC)     Foot ulcer due to secondary DM ([facility] )     Hemodialysis patient ([facility] )     Tues, Thurs, Sat    Hypertension     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[facility] )     right eye    Morbid obesity (HCC)     Osteomyelitis of fifth toe of right foot (HCC)     Panic attacks     Pulmonary embolism (HCC)     Reflux esophagitis     Thrombophlebitis of saphenous vein     Warfarin anticoagulation      Past Surgical History  Past Surgical History:   Procedure Laterality Date    AMPUTATION      r 4th toe    ARTERIOVENOUS GRAFT PLACEMENT      CATARACT EXTRACTION Bilateral     CERVICAL BIOPSY  W/ LOOP ELECTRODE EXCISION       SECTION      DIALYSIS FISTULA CREATION      DILATION AND CURETTAGE OF UTERUS      ENDOMETRIAL ABLATION W/ NOVASURE      EYE SURGERY      HYSTERECTOMY      @ age 57 (complete)    IR AV FISTULAGRAM/GRAFTOGRAM  10/11/2019    IR AV FISTULAGRAM/GRAFTOGRAM  2020    IR AV FISTULAGRAM/GRAFTOGRAM  2020    IR NON-TUNNELED CENTRAL LINE PLACEMENT  2021    IR NON-TUNNELED CENTRAL LINE PLACEMENT  10/4/2021    OOPHORECTOMY Bilateral     @ age 57    [facility] AMPUTATION FOOT,TRANSMETATARSAL Right 12/26/2021    Procedure: AMPUTATION TRANSMETATARSAL (TMA);  Surgeon: Marycruz Ritter DPM;  Location: [facility];  Service: Podiatry    TX AMPUTATION TOE,MT-P JT Right 5/28/2020    Procedure: AMPUTATION TOE- 5th;  Surgeon: Marycruz Ritter DPM;  Location: [facility];  Service: Podiatry    TX COLONOSCOPY FLX DX W/COLLJ [address] PFRMD N/A 3/13/2019    Procedure: COLONOSCOPY;  Surgeon: Landon Montanez MD;  Location: [facility]; Service: Gastroenterology    TX ESOPHAGOGASTRODUODENOSCOPY TRANSORAL DIAGNOSTIC N/A 3/13/2019    Procedure: ESOPHAGOGASTRODUODENOSCOPY (EGD); Surgeon: Landon Montanez MD;  Location: [facility]; Service: Gastroenterology    TX LAPAROSCOPY W TOT HYSTERECT UTERUS 250 GRAM OR LESS N/A 2/16/2016    Procedure: HYSTERECTOMY LAPAROSCOPIC TOTAL [facility]), with bilateral salpingectomy;  Surgeon: Kingston Goncalves DO;  Location: [facility];  Service: Gynecology    THROMBECTOMY / ARTERIOVENOUS GRAFT REVISION      TOE SURGERY Right     removal of the 4th toe    WOUND DEBRIDEMENT Right 10/8/2021    Procedure: R 1st MTPJ washout ;  Surgeon: Pham Bradford DPM;  [facility]: [facility];  Service: Podiatry         08/31/22 0926   OT Last Visit   OT Visit Date 08/31/22   Note Type   Note type Evaluation   Additional Comments Pt seen with PT to increase safety, decrease fall risk, and maximize functional/occupational performance which is a regresssion from pt's functional baseline  Restrictions/Precautions   Weight Bearing Precautions Per Order No   Other Precautions Chair Alarm;Multiple lines;Telemetry; Fall Risk;Pain   Pain Assessment   Pain Assessment Tool 0-10   Pain Score 9   Pain Location/Orientation Location: Hip;Location: Knee; Location: Leg   Hospital Pain Intervention(s) Repositioned; Ambulation/increased activity; Emotional support   Home Living   Type of Home House; Other (Comment)  (2 SH with 2 BARBARA)   Home Layout Two level;1/2 bath on main level;Bed/bath upstairs; Access; Able to live on main level with bedroom/bathroom   Bathroom Shower/Tub Tub/shower unit   Bathroom Toilet Standard   Bathroom Equipment Shower chair   Bathroom Accessibility Accessible   Home Equipment Walker;Cane   Additional Comments Pt reports living in 2 [address] [place] [address] with 2 BARBARA; reports using RW PTA   Prior Function   Level of [place] Independent with ADLs and functional mobility; Needs assistance with IADLs   Lives With Significant other; Son   Receives Help From Family   ADL Assistance Independent   IADLs Needs assistance   Falls in the last 6 months 1 to 4  (3)   Vocational Unemployed   Comments (-) driving, reports that her boyfriend assists with transportation needs prn; reports that her boyfriend completes all IADLs   Lifestyle   Autonomy PTA, pt was independent in ADLs, requires assistance for IADLs, and reports using RW for functional mobility; (-) driving   Reciprocal Relationships Lives with her boyfriend and son   Service to Others Reports she does not work   Intrinsic Gratification Enjoys spending time with her son   Subjective   Subjective "Do I have to, I don't want to move "   ADL   Where Assessed Edge of bed   Eating Assistance 5  Supervision/Setup   Grooming Assistance 4  Minimal Assistance   [address] 4  Minimal Assistance   LB [address] 3  Moderate Assistance   [address] 4  Minimal Assistance    [facility] 3  Moderate [address]  4  Minimal Assistance   Functional Assistance 4  Minimal Assistance   Bed Mobility   Supine to Sit 4  Minimal assistance   Additional items Assist x 1;HOB elevated; Increased time required;Verbal cues;LE management; Bedrails   Sit to Supine Unable to assess   Additional Comments Pt performed supine <> sit with Min A x1 for UB postural support and LE management and required significant increased time during transitional movements w/ use of bed rails and HOB fully elevated; @ end of session, pt left sitting upright in recliner with all functional needs in reach w/ alarm activated and RN notified   Transfers   Sit to Stand 4  Minimal assistance   Additional items Assist x 1; Increased time required;Verbal cues   Stand to Sit 4  Minimal assistance   Additional items Assist x 1; Increased time required;Verbal cues   Additional Comments Pt performed STS with Min A x1 w/ RW for safety and support with verbal cues for safety and proper technique t/o   Functional Mobility   Functional Mobility 4  Minimal assistance   Additional Comments Pt performed short household functional mobility distances with Min A x1 w/ RW for safety and support; pt w/ increased impulsivity @ times, requiring verbal cues for proper technique and safety   Balance   Static Sitting Fair -   Dynamic Sitting Fair -   Static Standing Poor +   Dynamic Standing Poor +   Ambulatory Poor +   Activity Tolerance   Activity Tolerance Patient limited by fatigue   Medical Staff Made Aware DPT Isabella   Nurse Made Aware RN cleared for OT eval   RUE Assessment   RUE Assessment WFL  (grossly 3+/5 MMT)   LUE Assessment   LUE Assessment WFL  (grossly 3+/5 MMT)   Hand Function   Gross Motor Coordination Functional   Fine Motor Coordination Functional   Sensation   Light Touch No apparent deficits   Proprioception   Proprioception No apparent deficits   Vision - Complex Assessment   Ocular Range of Motion WFL   Perception   Inattention/Neglect Appears intact   Cognition   Overall Cognitive Status Impaired   Arousal/Participation Alert; Responsive;Arousable; Cooperative   Attention Attends with cues to redirect   Orientation Level Oriented to person   Memory Decreased recall of precautions;Decreased recall of recent events   Following Commands Follows one step commands with increased time or repetition   Comments Pt presents w/ increased lethargy and flat affect, requiring increased verbal encouragement and education to participate; pt perservates on grooming tasks while sitting EOB, requiring increased verbal encouargement for task completion; pt impulsive @ times during functional mobility, requiring increased verbal cues for safety and proper technique; presents with decreased safety awareness/insight, and judgement   Assessment   Limitation Decreased ADL status; Decreased UE strength;Decreased Safe judgement during ADL;Decreased cognition;Decreased endurance;Decreased self-care trans;Decreased high-level ADLs   Prognosis Fair   Assessment Pt is a 55 yo female who presented to [facility] with decreased level of consciousness after dialysis as per chart review, pt found starring off into distances  Note pt w/ shark R sided back/hip pain in which CT imaging revealed no acute displaced fxs  Pt  has a past medical history of Abnormal uterine bleeding (AUB), Anemia, Anxiety, Arthritis, Chronic kidney disease, Claustrophobia, Diabetes mellitus ([address] ), Disease of thyroid gland, DVT (deep venous thrombosis) ([address] ), End stage kidney disease ([address] ), Foot ulcer due to secondary DM ([facility] ), Hemodialysis patient ([facility] ), Hypertension, 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[address] ), Morbid obesity ([address] ), Osteomyelitis of fifth toe of right foot ([address] ), Panic attacks, Pulmonary embolism ([address] ), Reflux esophagitis, Thrombophlebitis of saphenous vein, and Warfarin anticoagulation  Pt with active OT orders and OT consulted to assess pt's functional status and occupational performance to determine safe d/c needs  Pt seen for co-eval with PT to increase safety, decrease fall risk, and maximize functional/occupational performance 2* medical complexity which is a regression from pt's functional baseline  Pt lives with her boyfriend and son in a 2  with 2 BARBARA  PTA, pt was independent in ADLs, requires assistance for IADLs, and reports using RW for functional mobility  (-) driving   Currently, pt performing bed mobility w/ Min A, functional transfers w/ Min A w/ RW, functional mobility w/ Min A x1 w/ RW, UB ADLs with Min A, and LB ADLs with Mod A  Pt demonstrates the following limitations/impairments which impact the pt's ability to engage in valued occupations: cognition, balance, endurance/activity tolerance, postural/trunk control, strength, pain, and safety awareness  From an OT standpoint, recommend discharge to post-acute rehab once medically stable  The patient's raw score on the AM-PAC Daily Activity inpatient short form is 16, standardized score is 35 96, less than 39 4  Patients at this level are likely to benefit from discharge to post-acute rehabilitation services  Please refer to the recommendation of the Occupational Therapist for safe discharge planning  Pt would benefit from skilled OT services 3-5/wk to address immediate acute care needs and underlying performance skills to promote safety, decrease fall risk, and enhance occupational performance to return to PLOF  Goals to be met within the next 10-14 days  Goals   Patient Goals To walk without any pain   LTG Time Frame 10-14   Long Term Goal #1 See OT goals listed below   Plan   Treatment Interventions ADL retraining;Functional transfer training;UE strengthening/ROM; Endurance training;Cognitive reorientation;Patient/family training;Equipment evaluation/education; Compensatory technique education;Continued evaluation; Energy conservation; Activityengagement   Goal Expiration Date 09/14/22   OT Frequency 3-5x/wk   Recommendation   OT Discharge Recommendation Post acute rehabilitation services  (pending functional progress and available support @ home)   Equipment Recommended Other (comment)  (TBD)   AM-PAC Daily Activity Inpatient   Lower Body Dressing 2   Bathing 2   Toileting 2   Upper Body Dressing 3   Grooming 3   Eating 4   Daily Activity Raw Score 16   Daily Activity Standardized Score (Calc for Raw Score >=11) 35 96   AM-PAC Applied Cognition Inpatient   Following a Speech/Presentation 3   Understanding Ordinary Conversation 4   Taking Medications 3   Remembering Where Things Are Placed or Put Away 3   Remembering List of 4-5 Errands 3   Taking Care of Complicated Tasks 2   Applied Cognition Raw Score 18   Applied Cognition Standardized Score 38 07          OT GOALS:    Pt will improve functional mobility during ADL/IADL/leisure tasks with Mod I using AE/DME prn  Pt will improve activity tolerance/functional endurance during ADL/IADL/leisure tasks for at least 25 minutes to improve occupational performance and engagement in valued occupations using AE/DME prn  Pt will engage in ongoing functional/formal cognitive assessments to assist with safe d/c planning and increase safety during functional tasks  Pt will be A/Ox4 100% of the time and follow at least 2 step commands during functional activities with no verbal cues to increase attention, safety, and engagement in ADL/IADL/leisure tasks  Pt will participate in simulated IADL management task w/ Mod I to increase independence and engagement in valued occupations w/ good balance/safety  Pt will improve dynamic standing balance for at least 25 minutes with Mod I during functional tasks to improve independence and engagement in ADL/IADL/leisure activities  Pt will complete functional transfers on and off all surfaces used in daily routines with Mod I for safety to maximize functional/occupational performance  Pt will complete all bed mobility tasks with Mod I to serve as a prerequisite for EOB/OOB ADL/IADL/leisure tasks, optimize positioning/comfort, and increase functional independence  Pt will independently demonstrate good carryover of safety precautions and pt/caregiver education/training during ADL/IADL/leisure tasks with energy conservation techniques s/p skilled instruction without verbal cues  Pt will increase UE/LE MMT strength by 1/2 grade to improve bilateral coordination in ADL/IADL/leisure tasks with S      Pt will complete UB ADL tasks with Mod I using AE/DME prn to increase functional independence in ADL/IADL/leisure tasks  Pt will complete LB ADL tasks with Mod I using AE/DME prn to increase functional independence in ADL/IADL/leisure tasks  Pt will engage in depression screen/leisure interest checklist w/ G participation to monitor s/s depression and identify 3 positive coping strategies to assist w/ emotional regulation and management      Denita Franks MS, OTR/L

## 2022-08-31 NOTE — CASE MANAGEMENT
Case Management Discharge Planning Note    Patient name Rosalva Segura  Location 2 210/CW2 980-81 MRN [id number]  : 1965 Date 2022       Current Admission Date: 2022  Current Admission Diagnosis:Decreased level of consciousness   Patient Active Problem List    Diagnosis Date Noted    Decreased level of consciousness 2022    Elevated troponin 2022    Intractable back pain 2022    Problem with vascular access 2022    Concern for Osteomyelitis/Discitis of lumbar region 2022    Fall 2022    Hypoxia 2022    COVID-19 2022    Right knee pain 2022    Constipation 2022    Pruritus 2021    Postop check 2021    Sigmoid diverticulitis 2021    Pneumonia 2021    Chronic anticoagulation 2021    Arteriovenous fistula for hemodialysis in place, primary ([facility] ) 2021    Healthcare-associated pneumonia 2021    Right foot pain 2021    A-V fistula ([facility] ) 2021    Chronic pain syndrome 2021    Bilateral primary osteoarthritis of knee 2021    Bilateral patellofemoral syndrome 2021    Tinea unguium 2020    S/P foot surgery, right 2020    History of claustrophobia 2020    Morbid obesity 2020    Hyperlipidemia 2020    Diabetic polyneuropathy associated with type 2 diabetes mellitus ([facility] ) 2020    Encounter for diabetic foot exam ([facility] ) 2020    Corns and callosities 2020    History of transmetatarsal amputation of right foot ([facility] ) 2020    Flu-like symptoms 2020    Lumbar radiculopathy 2020    Low back pain with sciatica 2020    Hemodialysis-associated hypotension 2019    Hyponatremia 2019    Parenchymal renal hypertension 2019    Candidiasis of breast 2019    Hyperkalemia 2019    Anemia of chronic disease 2019    Disruption of internal surgical wound 08/26/2019    Dyspnea 05/17/2019    Secondary hyperparathyroidism of renal origin ([facility] ) 04/27/2019    Nausea and vomiting 04/26/2019    Meningioma ([facility] ) 04/18/2019    Concussion without loss of consciousness 03/15/2019    Colon cancer screening 03/05/2019    Gastroesophageal reflux disease 03/05/2019    Primary osteoarthritis of right knee 10/25/2018    Hemodialysis status ([facility] ) 10/23/2018    Knee pain 10/22/2018    Anxiety disorder 04/06/2017    Acquired hypothyroidism 07/22/2016    Glaucoma 07/01/2016    ESRD on hemodialysis 07/01/2016    Abnormal uterine bleeding 02/15/2016    History of venous thromboembolism 02/14/2016    Pulmonary embolus ([facility]  ) 10/30/2015    Cervical dysplasia 05/03/2015    Chronic endometritis 10/17/2014    Tinea pedis 10/02/2014    Benign neoplasm of skin 09/25/2014    Type 2 diabetes mellitus ([facility] ) 09/04/2014    Phlebitis and thrombophlebitis of superficial vessels of lower extremities 04/10/2014    Limb pain 11/25/2013    Mononeuritis of upper limb, unspecified laterality 11/25/2013    Legal blindness, as defined in [facility] 62/95/3975    Complication from renal dialysis device 04/22/2013    Essential hypertension 10/15/2012    Cardiomyopathy ([facility] ) 09/26/2012    Esophageal reflux 08/20/2012    Allergic rhinitis 08/20/2012      LOS (days): 0  Geometric Mean LOS (GMLOS) (days):   Days to GMLOS:     OBJECTIVE:            Current admission status: Observation   Preferred Pharmacy:   [facility]/pharmacy #[id number] - [address]  [address] [place] [address]  Phone: [phone] Fax: [address] [facility], [address] [address]  Phone: [phone] Fax: [phone]    Primary Care Provider: Marissa Degroot MD    Primary Insurance: MEDICARE  Secondary Insurance: [place]    DISCHARGE DETAILS:    Discharge planning discussed with[de-identified] Patient at bedside  Freedom of Choice: Yes     Other Referral/Resources/Interventions Provided:  Interventions: Short Term Rehab, Acute Rehab  Referral Comments: CM spoke with pt at bedside to discuss post-acute rehabilitation recommendation -- Pt confirmed understanding of the PT/OT team's recommendation and inquired if her family can visit her upon placement  CM provided confirmation of same and inquired if pt has ever been to IP rehab; Pt declined  Pt states she is familiar with a rehab facility near her home but is unsure of the name  CM reviewed the therapy differences between acute rehab and STR and pt expressed interest in [facility]  Pt is agreeable to a broad STR referral for second options -- Referrals placed  Pt's mentation has improved expotentially in comparison to CM assessment completed this morning  Pt inquiring about coloring books to keep her busy

## 2022-08-31 NOTE — ASSESSMENT & PLAN NOTE
· Suspect non MI troponin elevation secondary to hypertensive urgency on admission   · Also with underlying ESRD on HD  · Denies any chest pain palpitation or shortness of breath, EKG without acute ischemic changes

## 2022-08-31 NOTE — ASSESSMENT & PLAN NOTE
Lab Results   Component Value Date    EGFR 6 08/31/2022    EGFR 10 08/30/2022    EGFR 4 08/20/2022    CREATININE 6 46 (H) 08/31/2022    CREATININE 4 35 (H) 08/30/2022    CREATININE 8 21 (H) 08/20/2022   · On dialysis TTS  · Nephrology consulted for HD management, plan for HD tomorrow on regular schedule

## 2022-08-31 NOTE — CONSULTS
Consultation - Nephrology   Cam Meeks 56 y o  female MRN: [id number]  Unit/Bed#: CW2 210-01 Encounter: [id number]    ASSESSMENT and PLAN:  1  ESRD on HD TTS at [address]  Plan for hemodialysis tomorrow following her outpatient orders  Cautious with inter dialytic hypotension  2  Altered mental status, reported after finishing dialysis yesterday  Currently improved  Unclear etiology, noted also patient with hypoglycemic episodes after  Further workup per primary team    3  Insulin-dependent diabetes with hypoglycemia, endocrinology consulted  4  Anemia secondary to chronic kidney disease, monitor H&H and transfusion as needed for hemoglobin less than 7, no MITCHELL due to history of PE    5  Mineral bone disease, continue with renal diet and binders with meals    6  Hemodynamics, blood pressure is stable, continue with current regimen, cautious with inter dialytic hypotension    7  Access, left upper extremity AV fistula in place      SUMMARY OF RECOMMENDATIONS:  Plan for hemodialysis tomorrow following outpatient orders  Cautious with inter dialytic hypotension  Endocrinology consulted for hypoglycemia  Further workup for altered mental status episodes per primary team      HISTORY OF PRESENT ILLNESS:  Requesting Physician: Nicolasa Pugh MD  Reason for Consult:  ESRD on HD    Cam Meeks is a 56 y o  female who was [facility] to [facility] after presenting with decreased level of consciousness at the end of dialysis yesterday  A renal consultation is requested today for assistance in the management of ESRD on HD    Patient with history of ESRD, diabetes, hypertension, morbid obesity, history of PE, who presents to the hospital yesterday due to decreased level of consciousness after finishing dialysis, patient states that she was able to hear nurses calling her name but she could answer, per H and P no seizure-like activities, patient shortly come back to her baseline mentation  Patient was hospitalized for further evaluation, nephrology was consulted for continuation of hemodialysis  During my evaluation today in general she is complaining of right-sided back and abdominal pain that is been going on for several months associated with decreased appetite  Currently denies any chest pain, no shortness of breath, no nausea, no vomiting, no diarrhea      PAST MEDICAL HISTORY:  Past Medical History:   Diagnosis Date    Abnormal uterine bleeding (AUB)     Anemia     Anxiety     Arthritis     Chronic kidney disease     Claustrophobia     Diabetes mellitus [facility] )     Disease of thyroid gland     DVT (deep venous thrombosis) (HCC)     End stage kidney disease (HCC)     Foot ulcer due to secondary DM ([facility] )     Hemodialysis patient ([facility] )     Tues, Thurs, Sat    Hypertension     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[facility] )     right eye    Morbid obesity ([facility] )     Osteomyelitis of fifth toe of right foot ([facility] )     Panic attacks     Pulmonary embolism (HCC)     Reflux esophagitis     Thrombophlebitis of saphenous vein     Warfarin anticoagulation        PAST SURGICAL HISTORY:  Past Surgical History:   Procedure Laterality Date    AMPUTATION      r 4th toe    ARTERIOVENOUS GRAFT PLACEMENT      CATARACT EXTRACTION Bilateral     CERVICAL BIOPSY  W/ LOOP ELECTRODE EXCISION       SECTION      DIALYSIS FISTULA CREATION      DILATION AND CURETTAGE OF UTERUS      ENDOMETRIAL ABLATION W/ NOVASURE      EYE SURGERY      HYSTERECTOMY      @ age 57 (complete)    IR AV FISTULAGRAM/GRAFTOGRAM  10/11/2019    IR AV FISTULAGRAM/GRAFTOGRAM  2020    IR AV FISTULAGRAM/GRAFTOGRAM  2020    IR NON-TUNNELED CENTRAL LINE PLACEMENT  2021    IR NON-TUNNELED CENTRAL LINE PLACEMENT  10/4/2021    OOPHORECTOMY Bilateral     @ age 57    PERICARDIUM SURGERY      CA AMPUTATION FOOT,TRANSMETATARSAL Right 2021    Procedure: AMPUTATION TRANSMETATARSAL (TMA); Surgeon: Nga Blackmon DPM;  Location: [facility];  Service: Podiatry    VT AMPUTATION TOE,MT-P JT Right 5/28/2020    Procedure: AMPUTATION TOE- 5th;  Surgeon: Nga Blackmon DPM;  Location: [facility];  Service: Podiatry    VT COLONOSCOPY FLX DX W/COLLJ [address] PFRMD N/A 3/13/2019    Procedure: COLONOSCOPY;  Surgeon: Yoselin Villatoro MD;  Location: [facility]; Service: Gastroenterology    VT ESOPHAGOGASTRODUODENOSCOPY TRANSORAL DIAGNOSTIC N/A 3/13/2019    Procedure: ESOPHAGOGASTRODUODENOSCOPY (EGD); Surgeon: Yoselin Villatoro MD;  Location: [facility];   Service: Gastroenterology    VT LAPAROSCOPY W TOT HYSTERECT UTERUS 250 GRAM OR LESS N/A 2/16/2016    Procedure: HYSTERECTOMY LAPAROSCOPIC TOTAL [facility]), with bilateral salpingectomy;  Surgeon: Chava Medellin DO;  Location: [facility];  Service: Gynecology    THROMBECTOMY / ARTERIOVENOUS GRAFT REVISION      TOE SURGERY Right     removal of the 4th toe    WOUND DEBRIDEMENT Right 10/8/2021    Procedure: R 1st MTPJ washout ;  Surgeon: Gena Curiel DPM;  Location: [facility];  Service: Podiatry       SOCIAL HISTORY:  Social History     Substance and Sexual Activity   Alcohol Use Never    Alcohol/week: 0 0 standard drinks     Social History     Substance and Sexual Activity   Drug Use No     Social History     Tobacco Use   Smoking Status Never Smoker   Smokeless Tobacco Never Used       FAMILY HISTORY:  Family History   Problem Relation Age of Onset    Heart disease Family     Diabetes Family     Heart disease Mother     Cancer Brother         neck    Throat cancer Brother     Ovarian cancer Maternal Aunt 40       ALLERGIES:  Allergies   Allergen Reactions    Iodinated Diagnostic Agents Itching    Keflex [Cephalexin] Hives    Wound Dressing Adhesive Rash       MEDICATIONS:    Current Facility-Administered Medications:     acetaminophen (TYLENOL) tablet 650 mg, 650 mg, Oral, Q6H PRN, Lola Randolph MD, 650 mg at 08/30/22 2303    acetaminophen (TYLENOL) tablet 975 mg, 975 mg, Oral, Q8H [facility], Lottie Oliveira MD, 975 mg at 08/31/22 1405    albuterol (PROVENTIL HFA,VENTOLIN HFA) inhaler 2 puff, 2 puff, Inhalation, Q6H PRN, Kumar Banks MD    ALPRAZolam [place]) tablet 0 25 mg, 0 25 mg, Oral, BID PRN, Kumar Banks MD, 0 25 mg at 08/31/22 0850    atorvastatin (LIPITOR) tablet 20 mg, 20 mg, Oral, Daily With Rosalio Garduno MD, 20 mg at 08/30/22 1610    b complex-vitamin C-folic acid (NEPHROCAPS) capsule 1 capsule, 1 capsule, Oral, Daily With Rosalio Garduno MD, 1 capsule at 08/30/22 1610    [START ON 9/1/2022] calcitriol (ROCALTROL) capsule 0 25 mcg, 0 25 mcg, Oral, Once per day on Tue Thu Ute Schaefer CRNP    carvedilol (COREG) tablet 25 mg, 25 mg, Oral, BID, Kumar Banks MD, 25 mg at 08/31/22 0850    gabapentin (NEURONTIN) capsule 100 mg, 100 mg, Oral, TID, Kumar Banks MD, 100 mg at 08/31/22 0853    heparin (porcine) subcutaneous injection 5,000 Units, 5,000 Units, Subcutaneous, Q8H [facility], Kumar Banks MD, 5,000 Units at 08/31/22 1406    hydrALAZINE (APRESOLINE) injection 10 mg, 10 mg, Intravenous, Q6H PRN, Kumar Banks MD    hydrOXYzine HCL (ATARAX) tablet 25 mg, 25 mg, Oral, Q6H PRN, Kumar Banks MD, 25 mg at 08/30/22 1834    insulin glargine (LANTUS) subcutaneous injection 10 Units 0 1 mL, 10 Units, Subcutaneous, HS, Lottie Oliveira MD, 10 Units at 08/30/22 2053    insulin lispro (HumaLOG) 100 units/mL subcutaneous injection 2-12 Units, 2-12 Units, Subcutaneous, TID AC **AND** Fingerstick Glucose (POCT), , , TID AC, Lottie Oliveira MD    [START ON 9/1/2022] iron sucrose (VENOFER) 50 mg in sodium chloride 0 9 % 100 mL IVPB, 50 mg, Intravenous, Weekly, LOIS Harper    levothyroxine tablet 50 mcg, 50 mcg, Oral, Early Morning, Lottie Oliveira MD, 50 mcg at 08/31/22 0704    lidocaine (LIDODERM) 5 % patch 1 patch, 1 patch, Topical, Daily, Kumar Banks MD, 1 patch at 08/30/22 1612    loratadine (CLARITIN) tablet 5 mg, 5 mg, Oral, Daily, Lottie Oliveira MD, 5 mg at 08/31/22 0850    melatonin tablet 3 mg, 3 mg, Oral, HS, Carri Garcia MD, 3 mg at 08/30/22 2053    menthol-methyl salicylate (BENGAY) 03-07 % cream, , Apply externally, 4x Daily PRN, Carri Garcia MD    methocarbamol (ROBAXIN) tablet 500 mg, 500 mg, Oral, Q6H [facility], Carri Garcia MD, 500 mg at 08/31/22 1217    ondansetron (ZOFRAN) injection 4 mg, 4 mg, Intravenous, Q6H PRN, Carri Garcia MD    oxyCODONE (ROXICODONE) IR tablet 5 mg, 5 mg, Oral, Q8H PRN, Carri Garcia MD, 5 mg at 08/31/22 0705    pantoprazole (PROTONIX) EC tablet 40 mg, 40 mg, Oral, BID, Lottie Oliveira MD, 40 mg at 08/31/22 0850    pregabalin (LYRICA) capsule 50 mg, 50 mg, Oral, Daily, Lottie Oliveira MD, 50 mg at 08/31/22 0851    senna (SENOKOT) tablet 17 2 mg, 2 tablet, Oral, HS PRN, Carri Garcia MD    sertraline (ZOLOFT) tablet 75 mg, 75 mg, Oral, Daily, Lottie Oliveira MD, 75 mg at 08/31/22 0851    sevelamer (RENAGEL) tablet 1,600 mg, 1,600 mg, Oral, TID With Meals, Carri Garcia MD, 1,600 mg at 08/31/22 1217    REVIEW OF SYSTEMS:  All the systems were reviewed and were negative except as documented on the HPI      PHYSICAL EXAM:  Current Weight:    First Weight:    Vitals:    08/30/22 1615 08/30/22 1922 08/30/22 2325 08/31/22 0851   BP: 143/87 138/59 136/56 131/58   BP Location:       Pulse: 82 70     Resp:       Temp: 97 7 °F (36 5 °C)  97 8 °F (36 6 °C)    TempSrc:       SpO2: (!) 88% 92%         Intake/Output Summary (Last 24 hours) at 8/31/2022 1515  Last data filed at 8/31/2022 0101  Gross per 24 hour   Intake 490 ml   Output --   Net 490 ml     General: conscious, cooperative, in not acute distress  Eyes: conjunctivae pink, anicteric sclerae  ENT: lips and mucous membranes moist  Neck: supple, no JVD  Chest: clear breath sounds bilateral, no crackles, ronchus or wheezings  CVS: distinct S1 & S2, normal rate, regular rhythm  Abdomen:  Obese, non-tender, non-distended, normoactive bowel sounds  Extremities: no significant edema of both legs  Skin: no rash  Neuro: awake, alert, oriented        Invasive Devices:        Lab Results:   Results from last 7 days   Lab Units 08/31/22  1022 08/30/22  1123   WBC Thousand/uL  --  6 16   HEMOGLOBIN g/dL  --  8 3*   HEMATOCRIT %  --  26 6*   PLATELETS Thousands/uL  --  187   SODIUM mmol/L 135 136   POTASSIUM mmol/L 3 8 3 6   CHLORIDE mmol/L 100 99   CO2 mmol/L 31 30   BUN mg/dL 19 14   CREATININE mg/dL 6 46* 4 35*   CALCIUM mg/dL 9 1 9 6   MAGNESIUM mg/dL 2 3  --    ALK PHOS U/L  --  102   ALT U/L  --  17   AST U/L  --  18       Other Studies:   CT scan of the head no acute intracranial abnormalities      Portions of the record may have been created with voice recognition software  Occasional wrong word or "sound a like" substitutions may have occurred due to the inherent limitations of voice recognition software  Read the chart carefully and recognize, using context, where substitutions have occurred  If you have any questions, please contact the dictating provider

## 2022-08-31 NOTE — APP STUDENT NOTE
FARHAT STUDENT  Inpatient Progress Note for TRAINING ONLY  Not Part of Legal Medical Record       Progress Note - Haleigh Cummins 56 y o  female MRN: [id number]    Unit/Bed#: CW2 210-01 Encounter: [id number]      Assessment:  Decreased Level of Consciousness  - Pt had episode of decreased consciousness after receiving dialysis  EMS does not note seizure like activity or hypotension  They state that she returned to her baseline shortly after  Pt appeared lethargic on exam today  - Pt had transient hypotension in the past during dialysis  - CT head negative for acute  - Blood glucose was 38 today  Was in the 42's and 52's yesterday  Blood glucose went up to 162 after dextrose and a meal    - Administer dextrose, decrease insulin?  - Continue to monitor blood sugars closely   - Continue neuro checks Q4    Lumbar radiculopathy  - Ct of the lower extremities was obtained in the ED negative for acute fractures, but shows possible cystitis  Need to check UA   - Continue pain control regimen    Elevated Troponin  - Pt was hypertensive in the ED  BP today 131/58  - Monitor EKG, and troponins    Constipation  - Continue bowel regimen    Chronic Anticoagulation  - Hx DVT and PE 6 years ago  - Continue Coumadin 10 mg daily  - Monitor INR  INR 1 5 today      Chronic Pain Syndrome  - Chronic low back pain  - Outpatient Chronic Pain doctor  - Control pain in hospital as needed    Pulmonary Embolus  - Hx of DVT with PE 6 years ago  - Continue Coumadin  - Monitor INR    Acquired Hypothyroidism  - Continue levothyroxine    Type II Diabetes Mellitus  - A1C in July 9 4  - On diabetic diet  - Insulin sliding scale, watching sugars  - Adding Dextrose for hypoglycemia, add some simple carbs    Allergic rhinitis  - continue claritin     Anemia of chronic disease  - secondary to ESRD  - Baseline Hgb appears to be around 8   - Hgb yesterday at baseline  - Monitor CBC    Esophageal reflux  - continue ppi    Anxiety  - Continue Xanax PRN  - Continue Zoloft daily    ESRD on Hemodialysis  - Baseline creatinine appears to be between 8-10  - Creatinine today 6 46  - Continue Dialysis  - Consult nephrology    Essential HTN  - Hypertensive in the ED  - BP stable today   - Torsemide and nifedipine DC/d on previous admission due to hypotension  - Continue carvedilol  - Hydralazine PRN      Subjective:   Pt states that she is feeling a little better than yesterday, but does still feel confused, difficulty concentrating, and "foggy"  Also reports tremor  Objective:     Vitals: Blood pressure 131/58, pulse 70, temperature 97 8 °F (36 6 °C), resp  rate 20, last menstrual period 02/12/2016, SpO2 92 %, not currently breastfeeding  ,There is no height or weight on file to calculate BMI  Intake/Output Summary (Last 24 hours) at 8/31/2022 1216  Last data filed at 8/31/2022 0101  Gross per 24 hour   Intake 490 ml   Output --   Net 490 ml       Physical Exam: Physical Exam  Constitutional:       Appearance: She is obese  Comments: Speech is in and out  Unable to complete sentences without closing eyes  Cardiovascular:      Rate and Rhythm: Normal rate  Pulmonary:      Effort: Pulmonary effort is normal       Breath sounds: Normal breath sounds  Abdominal:      Tenderness: There is no abdominal tenderness  Neurological:      Mental Status: She is lethargic  Invasive Devices  Report    Peripheral Intravenous Line  Duration           Peripheral IV 08/30/22 Right Antecubital 1 day          Line  Duration           Hemodialysis AV Fistula 02/20/18 Left Forearm 1652 days                Lab, Imaging and other studies: I have personally reviewed pertinent reports      VTE Pharmacologic Prophylaxis: Warfarin (Coumadin)  VTE Mechanical Prophylaxis: sequential compression device ordered

## 2022-08-31 NOTE — ASSESSMENT & PLAN NOTE
· History of DVT and PE 6 years ago  · On Coumadin, subtherapeutic, questionable compliance, does not seem to know which medications she is taking at home   · Maintained on Coumadin 9 mg daily on PTA list   · S/p Coumadin 10 mg yesterday, will give 9 mg tonight and check INR in the AM  · INR 1 05 today   · Goal INR 2-3   · No indication to bridge for now unless INR does not improve

## 2022-08-31 NOTE — ASSESSMENT & PLAN NOTE
· Hypertensive urgency noted on admission, likely in setting of missing dose of coreg yesterday   · Overall much improved, BP stabilized   · Continue Coreg 25 mg BID  · Torsemide and nifedipine were discontinued on previous admission 8/15 due to hypotension  · Monitor

## 2022-08-31 NOTE — ARC ADMISSION
Referral received for consideration of patient for inpatient acute rehab  Please note, patient will likely not qualify for [facility] given no acute medical necessity  However, will wait for PT eval and review with [facility] physician as able

## 2022-08-31 NOTE — PHYSICAL THERAPY NOTE
Physical Therapy Evaluation    Patient's Name: Bonnie Rivera    Admitting Diagnosis  Altered mental status [R41 82]  Elevated troponin [R77 8]    Problem List  Patient Active Problem List   Diagnosis    Essential hypertension    History of venous thromboembolism    Abnormal uterine bleeding    Glaucoma    ESRD on hemodialysis    Anxiety disorder    Knee pain    Hemodialysis status ([facility])    Primary osteoarthritis of right knee    Esophageal reflux    Colon cancer screening    Gastroesophageal reflux disease    Meningioma ([facility])    Nausea and vomiting    Secondary hyperparathyroidism of renal origin ([facility] )    Dyspnea    Hyperkalemia    Anemia of chronic disease    Disruption of internal surgical wound    Hyponatremia    Parenchymal renal hypertension    Candidiasis of breast    Hemodialysis-associated hypotension    Lumbar radiculopathy    Low back pain with sciatica    Flu-like symptoms    Diabetic polyneuropathy associated with type 2 diabetes mellitus ([facility] [address] )    Tinea pedis    Encounter for diabetic foot exam ([facility] [address] )    Corns and callosities    History of transmetatarsal amputation of right foot ([facility] [address] )    Morbid obesity    Hyperlipidemia    History of claustrophobia    Allergic rhinitis    Benign neoplasm of skin    Cardiomyopathy ([facility] [address] )    Cervical dysplasia    Chronic endometritis    Complication from renal dialysis device    Concussion without loss of consciousness    Type 2 diabetes mellitus ([facility] [address] )    Acquired hypothyroidism    Legal blindness, as defined in [place] [facility]    Limb pain    Mononeuritis of upper limb, unspecified laterality    Phlebitis and thrombophlebitis of superficial vessels of lower extremities    Pulmonary embolus ([facility])    S/P foot surgery, right    Tinea unguium    Bilateral primary osteoarthritis of knee    Bilateral patellofemoral syndrome    Chronic pain syndrome    A-V fistula ([facility])    Right foot pain    Arteriovenous fistula for hemodialysis in place, primary ([facility] [address] ) Healthcare-associated pneumonia    Pneumonia    Chronic anticoagulation    Sigmoid diverticulitis    Postop check    Pruritus    Constipation    Right knee pain    Fall    Hypoxia    COVID-19    Problem with vascular access    Concern for Osteomyelitis/Discitis of lumbar region    Intractable back pain    Decreased level of consciousness    Elevated troponin       Past Medical History  Past Medical History:   Diagnosis Date    Abnormal uterine bleeding (AUB)     Anemia     Anxiety     Arthritis     Chronic kidney disease     Claustrophobia     Diabetes mellitus ([facility] )     Disease of thyroid gland     DVT (deep venous thrombosis) (HCC)     End stage kidney disease (HCC)     Foot ulcer due to secondary DM ([facility] )     Hemodialysis patient ([facility] )     Tues, Thurs, Sat    Hypertension     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[facility] )     right eye    Morbid obesity (HCC)     Osteomyelitis of fifth toe of right foot (HCC)     Panic attacks     Pulmonary embolism (HCC)     Reflux esophagitis     Thrombophlebitis of saphenous vein     Warfarin anticoagulation        Past Surgical History  Past Surgical History:   Procedure Laterality Date    AMPUTATION      r 4th toe    ARTERIOVENOUS GRAFT PLACEMENT      CATARACT EXTRACTION Bilateral     CERVICAL BIOPSY  W/ LOOP ELECTRODE EXCISION       SECTION      DIALYSIS FISTULA CREATION      DILATION AND CURETTAGE OF UTERUS      ENDOMETRIAL ABLATION W/ NOVASURE      EYE SURGERY      HYSTERECTOMY      @ age 57 (complete)    IR AV FISTULAGRAM/GRAFTOGRAM  10/11/2019    IR AV FISTULAGRAM/GRAFTOGRAM  2020    IR AV FISTULAGRAM/GRAFTOGRAM  2020    IR NON-TUNNELED CENTRAL LINE PLACEMENT  2021    IR NON-TUNNELED CENTRAL LINE PLACEMENT  10/4/2021    OOPHORECTOMY Bilateral     @ age 57    [place] Right 2021    Procedure: AMPUTATION TRANSMETATARSAL (TMA);  Surgeon: Toya Lockhart DPM;  Location: [facility];  Service: Podiatry    SC AMPUTATION TOE,MT-P JT Right 5/28/2020    Procedure: AMPUTATION TOE- 5th;  Surgeon: Tesfaye Guerra DPM;  Location: [facility];  Service: Podiatry    SC COLONOSCOPY FLX DX W/COLLJ SPEC WHEN PFRMD N/A 3/13/2019    Procedure: COLONOSCOPY;  Surgeon: Kaila Sabillon MD;  Location: [facility]; Service: Gastroenterology    SC ESOPHAGOGASTRODUODENOSCOPY TRANSORAL DIAGNOSTIC N/A 3/13/2019    Procedure: ESOPHAGOGASTRODUODENOSCOPY (EGD); Surgeon: Kaila Sabillon MD;  Location: [facility]; Service: Gastroenterology    SC LAPAROSCOPY W TOT HYSTERECT UTERUS 250 GRAM OR LESS N/A 2/16/2016    Procedure: HYSTERECTOMY LAPAROSCOPIC TOTAL [facility]), with bilateral salpingectomy;  Surgeon: Oniel Molina DO;  Location: [facility];  Service: Gynecology    THROMBECTOMY / ARTERIOVENOUS GRAFT REVISION      TOE SURGERY Right     removal of the 4th toe    WOUND DEBRIDEMENT Right 10/8/2021    Procedure: R 1st MTPJ washout ;  Surgeon: Rudy Ahuja DPM;  Location: [facility];  Service: Podiatry        08/31/22 1000   PT Last Visit   PT Visit Date 08/31/22   Note Type   Note type Evaluation   Pain Assessment   Pain Assessment Tool FLACC   Pain Location/Orientation Location: Generalized; Location: Back   Hospital Pain Intervention(s) Repositioned; Ambulation/increased activity   Pain Rating: FLACC (Rest) - Face 0   Pain Rating: FLACC (Rest) - Legs 0   Pain Rating: FLACC (Rest) - Activity 0   Pain Rating: FLACC (Rest) - Cry 0   Pain Rating: FLACC (Rest) - Consolability 0   Score: FLACC (Rest) 0   Pain Rating: FLACC (Activity) - Face 1   Pain Rating: FLACC (Activity) - Legs 0   Pain Rating: FLACC (Activity) - Activity 0   Pain Rating: FLACC (Activity) - Cry 1   Pain Rating: FLACC (Activity) - Consolability 0   Score: FLACC (Activity) 2   Restrictions/Precautions   Other Precautions Fall Risk;Pain   Home Living   Type of Home House   Home Layout Two level;Stairs to enter with rails  (2 BARBARA)   Home Equipment Walker;Cane   Additional Comments Pt reports living in a [facility] with 2 BARBARA, has [address] (sleeps in recliner in living room)  Was ambulating with RW PTA   Prior Function   Level of [place] Independent with ADLs and functional mobility; Needs assistance with IADLs   Lives With Significant other; Son   Michael Help From Family   ADL Assistance Independent   IADLs Needs assistance   Falls in the last 6 months 1 to 4  (3 per pt)   Cognition   Arousal/Participation Lethargic   Orientation Level Oriented to person   Memory Decreased recall of precautions   Following Commands Follows one step commands with increased time or repetition   RLE Assessment   RLE Assessment WFL  (~4-/5, hx of TMA)   LLE Assessment   LLE Assessment WFL  (~4-/5)   Bed Mobility   Supine to Sit 4  Minimal assistance   Additional items Assist x 1;HOB elevated; Bedrails; Increased time required   Additional Comments extensive time required to transfer to EOB, pt requiring HOB to be fully up to complete transfer   Transfers   Sit to Stand 4  Minimal assistance   Additional items Assist x 1; Increased time required;Verbal cues   Stand to Sit 4  Minimal assistance   Additional items Assist x 1; Increased time required;Verbal cues   Additional Comments Transfers with RW  VCs for proper hand placement and safety   Ambulation/Elevation   Gait pattern Excessively slow; Short stride;Decreased foot clearance; Foward flexed   Gait Assistance 4  Minimal assist   Additional items Assist x 1   Assistive Device Rolling walker   Distance 10ft + 10ft   Balance   Static Sitting Fair -   Dynamic Sitting Fair -   Static Standing Poor +   Dynamic Standing Poor +   Ambulatory Poor +   Endurance Deficit   Endurance Deficit Yes   Endurance Deficit Description fatigue, weakness, BARRETO   Activity Tolerance   Activity Tolerance Patient limited by fatigue   Medical Staff Made Aware Seen with OT 2* pt's medical complexity and multiple comorbidities   PT focus on functional transfers, gait, and endurance   Nurse Made Aware ok to see per RN   Assessment   Prognosis Good   Problem List Decreased strength;Decreased endurance; Impaired balance;Decreased mobility;Obesity;Pain   Assessment Pt is a 56 y o  female seen for PT evaluation s/p admit [facility] on 8/30/2022  Pt was admitted with a primary dx of: decreased level of consciousness  PT now consulted for assessment of mobility and d/c needs  Pt with Activity as tolerated, PT evaluation orders  Pts current comorbidities effecting treatment include: anemia, anxiety, arthritis, CKD, DM, hemodialysis patient, HTN, 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, morbid obesity, R TMA, panic attacks, PE  Pts current clinical presentation is Unstable/ Unpredictable (high complexity) due to Ongoing medical management for primary dx, Decreased activity tolerance compared to baseline, Fall risk, Increased assistance needed from caregiver at current time, Increased O2 via NC from pts baseline, Trending lab values  Prior to admission, pt was living with her boyfriend and son in [facility] with 2 BARBARA, stays on 1st floor  Pt reports being I with ADLs and using RW for mobility  Upon evaluation, pt currently is requiring Jules for bed mobility; Jules for transfers; and Jules for ambulation 10 ft x 2 w/ RW  Pt presents at PT eval functioning below baseline and currently w/ overall mobility deficits 2* to: BLE weakness, impaired balance, decreased endurance, gait deviations, pain, decreased activity tolerance compared to baseline, decreased functional mobility tolerance compared to baseline, fall risk  Pt currently at a fall risk 2* to impairments listed above  Pt will continue to benefit from skilled acute PT interventions to address stated impairments; to maximize functional mobility; for ongoing pt/ family training; and DME needs  At conclusion of PT session pt returned back in chair, chair alarm engaged and RN notified of session findings/recommendations with phone and call bell within reach   Pt denies any further questions at this time  The patient's AM-PAC Basic Mobility Inpatient Short Form Raw Score is 16  A Raw score of less than or equal to 16 suggests the patient may benefit from discharge to post-acute rehabilitation services  Please also refer to the recommendation of the Physical Therapist for safe discharge planning  Recommend rehab (vs HHPT pending progress) upon hospital D/C  Goals   Patient Goals to walk without pain   STG Expiration Date 09/14/22   Short Term Goal #1 In 10-14 days pt will be able to: 1  Demonstrate ability to perform all aspects of bed mobility independently to increase functional independence  2  Perform functional transfers at mod (I) level to facilitate safe return to previous living environment  3   Ambulate at least 200 ft with LRAD at mod (I) level with stable vitals to improve safety with household distances and reduce fall risk  4  Climb at least 2 steps with HR at mod (I) level to simulate entrance to home  5  Improve LE strength grades by 1 to increase ease of functional mobility with transfers and gait  6  Pt will demonstrate improved balance by one grade order to decrease risk of falls  PT Treatment Day 0   Plan   Treatment/Interventions Functional transfer training;LE strengthening/ROM; Elevations; Therapeutic exercise; Endurance training;Equipment eval/education; Bed mobility;Gait training;Spoke to case management;Spoke to nursing;OT   PT Frequency 3-5x/wk   Recommendation   PT Discharge Recommendation Post acute rehabilitation services  (vs HHPT pending progress)   Equipment Recommended Walker  (pt already owns)   AM-[facility] Basic Mobility Inpatient   Turning in Bed Without Bedrails 3   Lying on Back to Sitting on Edge of Flat Bed 2   Moving Bed to Chair 3   Standing Up From Chair 3   Walk in Room 3   Climb 3-5 Stairs 2   Basic Mobility Inpatient Raw Score 16   Basic Mobility Standardized Score 38 32   Highest Level Of Mobility   -[facility] Goal 5: Stand one or more mins   -[facility] Achieved 6: Walk 10 steps or more   Modified Chase Scale   Modified Chase Scale 4   End of Consult   Patient Position at End of Consult Bedside chair;Bed/Chair alarm activated; All needs within reach       Lieutenant Dhara PT, DPT

## 2022-08-31 NOTE — PLAN OF CARE
Problem: OCCUPATIONAL THERAPY ADULT  Goal: Performs self-care activities at highest level of function for planned discharge setting  See evaluation for individualized goals  Description: Treatment Interventions: ADL retraining, Functional transfer training, UE strengthening/ROM, Endurance training, Cognitive reorientation, Patient/family training, Equipment evaluation/education, Compensatory technique education, Continued evaluation, Energy conservation, Activityengagement  Equipment Recommended: Other (comment) (TBD)       See flowsheet documentation for full assessment, interventions and recommendations  Note: Limitation: Decreased ADL status, Decreased UE strength, Decreased Safe judgement during ADL, Decreased cognition, Decreased endurance, Decreased self-care trans, Decreased high-level ADLs  Prognosis: Fair  Assessment: Pt is a 57 yo female who presented to \[facility]\"" with decreased level of consciousness after dialysis as per chart review, pt found starring off into distances  Note pt w/ shark R sided back/hip pain in which CT imaging revealed no acute displaced fxs  Pt  has a past medical history of Abnormal uterine bleeding (AUB), Anemia, Anxiety, Arthritis, Chronic kidney disease, Claustrophobia, Diabetes mellitus ([address] ), Disease of thyroid gland, DVT (deep venous thrombosis) ([address] ), End stage kidney disease ([address] ), Foot ulcer due to secondary DM ([address] ), Hemodialysis patient ([address] ), Hypertension, 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[address] ), Morbid obesity ([address] ), Osteomyelitis of fifth toe of right foot ([address] ), Panic attacks, Pulmonary embolism ([address] ), Reflux esophagitis, Thrombophlebitis of saphenous vein, and Warfarin anticoagulation  Pt with active OT orders and OT consulted to assess pt's functional status and occupational performance to determine safe d/c needs   Pt seen for co-eval with PT to increase safety, decrease fall risk, and maximize functional/occupational performance 2* medical complexity which is a regression from pt's functional baseline  Pt lives with her boyfriend and son in a 2  with 2 BARBARA  PTA, pt was independent in ADLs, requires assistance for IADLs, and reports using RW for functional mobility  (-) driving  Currently, pt performing bed mobility w/ Min A, functional transfers w/ Min A w/ RW, functional mobility w/ Min A x1 w/ RW, UB ADLs with Min A, and LB ADLs with Mod A  Pt demonstrates the following limitations/impairments which impact the pt's ability to engage in valued occupations: cognition, balance, endurance/activity tolerance, postural/trunk control, strength, pain, and safety awareness  From an OT standpoint, recommend discharge to post-acute rehab once medically stable  The patient's raw score on the AM-PAC Daily Activity inpatient short form is 16, standardized score is 35 96, less than 39 4  Patients at this level are likely to benefit from discharge to post-acute rehabilitation services  Please refer to the recommendation of the Occupational Therapist for safe discharge planning  Pt would benefit from skilled OT services 3-5/wk to address immediate acute care needs and underlying performance skills to promote safety, decrease fall risk, and enhance occupational performance to return to PLOF  Goals to be met within the next 10-14 days       OT Discharge Recommendation: Post acute rehabilitation services (pending functional progress and available support @ home)

## 2022-08-31 NOTE — PLAN OF CARE
Problem: PAIN - ADULT  Goal: Verbalizes/displays adequate comfort level or baseline comfort level  Description: Interventions:  - Encourage patient to monitor pain and request assistance  - Assess pain using appropriate pain scale  - Administer analgesics based on type and severity of pain and evaluate response  - Implement non-pharmacological measures as appropriate and evaluate response  - Consider cultural and social influences on pain and pain management  - Notify physician/advanced practitioner if interventions unsuccessful or patient reports new pain  Outcome: Progressing     Problem: INFECTION - ADULT  Goal: Absence or prevention of progression during hospitalization  Description: INTERVENTIONS:  - Assess and monitor for signs and symptoms of infection  - Monitor lab/diagnostic results  - Monitor all insertion sites, i e  indwelling lines, tubes, and drains  - Monitor endotracheal if appropriate and nasal secretions for changes in amount and color  - [place] appropriate cooling/warming therapies per order  - Administer medications as ordered  - Instruct and encourage patient and family to use good hand hygiene technique  - Identify and instruct in appropriate isolation precautions for identified infection/condition  Outcome: Progressing     Problem: INFECTION - ADULT  Goal: Absence of fever/infection during neutropenic period  Description: INTERVENTIONS:  - Monitor WBC    Outcome: Progressing

## 2022-08-31 NOTE — CASE COMMUNICATION
FRANK Pt phone currently not working, pt plans to buy a new one  Pt reports for now staff can contact her by her boyfriends number [phone]

## 2022-08-31 NOTE — PROGRESS NOTES
[facility]  Progress Note Sandro Mann 1965, 56 y o  female MRN: [id number]  Unit/Bed#: CW2 210-01 Encounter: [id number]  Primary Care Provider: Marissa Degroot MD   Date and time admitted to hospital: 8/30/2022 10:34 AM      DOS: 8/31/2022  * Lethargy  Assessment & Plan  · Patient was in her usual state of health, after finishing dialysis on day of admission when she had an episode of decreased level of consciousness  Patient states she was able to hear nurses calling her name but she could not answer  Per EMS no seizure-like activities and shortly she was back at her baseline mentation  She had a sharp right-sided back pain which she has been dealing with since last year  Per ED, it was not reported that she was hypotensive during this episode  · Unclear cause  ?  Transient hypotension which she had in the past during dialysis  Less likely seizure, no seizure-like activities reported per facility, and shortly she was back at her baseline mentation    Possibly pain related vs  Related to hypoglycemia   · Nonfocal neurologic exam  · CT head negative for acute pathology  · Neuro checks Q 4 for 24 hours  · Monitor on telemetry, however pt has been refusing and taking leads off, no events noted   · Pt with persistent hypoglycemia here, endocrinology consulted, lantus discontinued, SSI coverage PRN  · Decrease Prn oxycodone and d/c scheduled robaxin, adjust additional sedating medications    Type 2 diabetes mellitus [facility])  Assessment & Plan  Lab Results   Component Value Date    HGBA1C 9 4 (H) 07/09/2022       Recent Labs     08/31/22  0657 08/31/22  0731 08/31/22  1141 08/31/22  1213   POCGLU 58* 48* 38* 162*       Blood Sugar Average: Last 72 hrs:  (P) 91 [id number]   · On Lantus 12 units HS and NovoLog sliding scale at home   · Noted to have persistent hypoglycemia while hospitalized today  · Will d/c lantus, continue PRN SSi coverage for now  · Endocrinology consult  · Encourage PO intake   · QID glucose checks  · Received 2 amps D50 today, last glucose check at 160s  · Diabetic diet  · Could be contributing to patient's overall presentation     Lumbar radiculopathy  Assessment & Plan  · Previous provider reviewed note from neuro surgery 08/04/2022  · Low back pain probably/likely facet joint related  Multiple admissions and ED visits for right-sided back pain that radiates to her legs, more on the right side  Prior MRI demonstrated moderately advanced degenerative lumbar spine disease at multi levels  L4-5 disc herniation more on the right with moderate central canal stenosis  MRI with questionable L4-L5 osteomyelitis/diskitis per neuro surgery and ID highly unlikely  Underwent bilateral L3, L4-L5 dorsal ramus block at [facility] in May 2022 with no improvement in symptoms  · Due to recent fall prior to her previous admission at the beginning of August CT of the right leg was ordered in the ED  No acute displaced fracture, mild osteoarthrosis  Possible cystitis   · Due to possible cystitis on CT scan, UA ordered and not yet obtained  Patient makes minimal urine  Denies dysuria    · Conservative measures with aqua k pad, bengay, Lyrica  · Will decrease PRN oxycodone to 2 5 mg and switch scheduled robaxin to PRN in setting of intermittent lethargy while inpatient   · PT/OT recommending STR which patient is in agreement with     Elevated troponin  Assessment & Plan  · Suspect non MI troponin elevation secondary to hypertensive urgency on admission   · Also with underlying ESRD on HD  · Denies any chest pain palpitation or shortness of breath, EKG without acute ischemic changes    Constipation  Assessment & Plan  · Continue daily miralax and colace  · Monitor stool output    Pulmonary embolus (HCC)  Assessment & Plan  · History of DVT and PE 6 years ago  · On Coumadin, subtherapeutic, questionable compliance, does not seem to know which medications she is taking at home   · Maintained on Coumadin 9 mg daily on PTA list   · S/p Coumadin 10 mg yesterday, will give 9 mg tonight and check INR in the AM  · INR 1 05 today   · Goal INR 2-3   · No indication to bridge for now unless INR does not improve     Acquired hypothyroidism  Assessment & Plan  · Continue levothyroxine 50 mcg daily    Anemia of chronic disease  Assessment & Plan  · Secondary to end-stage renal disease  · Hemoglobin at baseline at 8 3    Esophageal reflux  Assessment & Plan  · Continue PPI    Anxiety disorder  Assessment & Plan  · Continue Xanax 0 25 mg b i d  p r n , hold for sedation   · Continue Zoloft 75 mg daily    ESRD on hemodialysis  Assessment & Plan  Lab Results   Component Value Date    EGFR 6 08/31/2022    EGFR 10 08/30/2022    EGFR 4 08/20/2022    CREATININE 6 46 (H) 08/31/2022    CREATININE 4 35 (H) 08/30/2022    CREATININE 8 21 (H) 08/20/2022   · On dialysis TTS  · Nephrology consulted for HD management, plan for HD tomorrow on regular schedule     Essential hypertension  Assessment & Plan  · Hypertensive urgency noted on admission, likely in setting of missing dose of coreg yesterday   · Overall much improved, BP stabilized   · Continue Coreg 25 mg BID  · Torsemide and nifedipine were discontinued on previous admission 8/15 due to hypotension  · Monitor     VTE Pharmacologic Prophylaxis: VTE Score: 3 Moderate Risk (Score 3-4) - Pharmacological DVT Prophylaxis Ordered: heparin  Patient Centered Rounds: I evaluated the patient without nursing staff present due to speaking to nurse outside patient's room   Discussions with Specialists or Other Care Team Provider: Discussed with RN, PRISCILA, SLIM attending and reviewed previous notes     Education and Discussions with Family / Patient: will update patient's significant other   Time Spent for Care: 20 minutes  More than 50% of total time spent on counseling and coordination of care as described above      Current Length of Stay: 0 day(s)  Current Patient Status: Inpatient   Certification Statement: The patient, admitted on an observation basis, will now require > 2 midnight hospital stay due to hypoglycemia, waxing and waning mentation, PT/OT  Discharge Plan: Anticipate discharge in 24-48 hrs to rehab facility  Code Status: Level 1 - Full Code    Subjective:   Pt reports that she feels okay today, states that she feels tired  Denies any chest pain, shortness of breath  States she did not have much of an appetite this morning  Objective:     Vitals:   Temp (24hrs), Av 8 °F (36 6 °C), Min:97 7 °F (36 5 °C), Max:97 8 °F (36 6 °C)    Temp:  [97 7 °F (36 5 °C)-97 8 °F (36 6 °C)] 97 8 °F (36 6 °C)  HR:  [70-82] 70  BP: (131-143)/(56-87) 131/58  SpO2:  [88 %-92 %] 92 %  There is no height or weight on file to calculate BMI  Input and Output Summary (last 24 hours): Intake/Output Summary (Last 24 hours) at 2022 1537  Last data filed at 2022 0101  Gross per 24 hour   Intake 490 ml   Output --   Net 490 ml       Physical Exam:   Physical Exam  Vitals reviewed  Constitutional:       General: She is not in acute distress  Appearance: She is not toxic-appearing  Comments: Pt is in no acute distress sitting in her hospital bed resting comfortably  Somnolent, falling to sleep easily during conversation  Does awaken to verbal stimuli but falls back to sleep    HENT:      Head: Normocephalic and atraumatic  Cardiovascular:      Rate and Rhythm: Normal rate and regular rhythm  Pulses: Normal pulses  Pulmonary:      Effort: Pulmonary effort is normal  No respiratory distress  Abdominal:      General: Bowel sounds are normal  There is no distension  Palpations: Abdomen is soft  Musculoskeletal:      Right lower leg: No edema  Left lower leg: No edema  Skin:     General: Skin is warm and dry  Neurological:      Mental Status: She is alert            Additional Data:     Labs:  Results from last 7 days   Lab Units 22  1123   WBC Thousand/uL 6 16   HEMOGLOBIN g/dL 8 3*   HEMATOCRIT % 26 6*   PLATELETS Thousands/uL 187   NEUTROS PCT % 70   LYMPHS PCT % 17   MONOS PCT % 9   EOS PCT % 2     Results from last 7 days   Lab Units 22  1022 22  1123   SODIUM mmol/L 135 136   POTASSIUM mmol/L 3 8 3 6   CHLORIDE mmol/L 100 99   CO2 mmol/L 31 30   BUN mg/dL 19 14   CREATININE mg/dL 6 46* 4 35*   ANION GAP mmol/L 4 7   CALCIUM mg/dL 9 1 9 6   ALBUMIN g/dL  --  3 1*   TOTAL BILIRUBIN mg/dL  --  0 80   ALK PHOS U/L  --  102   ALT U/L  --  17   AST U/L  --  18   GLUCOSE RANDOM mg/dL 71 103     Results from last 7 days   Lab Units 22  1022   INR  1 05     Results from last 7 days   Lab Units 22  1213 22  1141 22  0731 22  0657 22  2102 22  1621 22  1049   POC GLUCOSE mg/dl 162* 38* 48* 58* 129 98 110               Lines/Drains:  Invasive Devices  Report    Peripheral Intravenous Line  Duration           Peripheral IV 22 Right Antecubital 1 day          Line  Duration           Hemodialysis AV Fistula 18 Left Forearm 1652 days                  Telemetry:  Telemetry Orders (From admission, onward)             24 Hour Telemetry Monitoring  Continuous x 24 Hours (Telem)           References:    Telemetry Guidelines   Question:  Reason for 24 Hour Telemetry  Answer:  Syncope suspected to be cardiac in origin                 Telemetry Reviewed: multiple leads fail  Indication for Continued Telemetry Use: Syncope             Imaging: Reviewed radiology reports from this admission including: CT lower extremity     Recent Cultures (last 7 days):         Last 24 Hours Medication List:   Current Facility-Administered Medications   Medication Dose Route Frequency Provider Last Rate    acetaminophen  650 mg Oral Q6H PRN Mina Sylvester MD      acetaminophen  975 mg Oral Q8H [address] Mina Sylvester MD      albuterol  2 puff Inhalation Q6H PRN Lottie MD Roxanne      ALPRAZolam  0 25 mg Oral BID PRN Mina Higinio, MD      atorvastatin  20 mg Oral Daily With Harmeet King MD      b complex-vitamin C-folic acid  1 capsule Oral Daily With MD Michelle Malone [START ON 9/1/2022] calcitriol  0 25 mcg Oral Once per day on Tue Thu Sat LOIS Heller      carvedilol  25 mg Oral BID Mina Sylvester, MD      docusate sodium  100 mg Oral BID Tanya Payne PA-C      gabapentin  100 mg Oral TID Mina Sylvester, MD      heparin (porcine)  5,000 Units Subcutaneous Q8H [address] Mina Sylvester, MD      hydrALAZINE  10 mg Intravenous Q6H PRN Mina Sylvester, MD      hydrOXYzine HCL  25 mg Oral Q6H PRN Mina Brand, MD      insulin glargine  10 Units Subcutaneous HS Mina Sylvester, MD      insulin lispro  2-12 Units Subcutaneous TID AC Mina Sylvester MD      [START ON 9/1/2022] iron sucrose  50 mg Intravenous Weekly LOIS Harper      levothyroxine  50 mcg Oral Early Morning Mina Higinio, MD      lidocaine  1 patch Topical Daily Mina Higinio, MD      loratadine  5 mg Oral Daily Mina Brand, MD      melatonin  3 mg Oral HS Mina Brand, MD      menthol-methyl salicylate   Apply externally 4x Daily PRN Mina Sylvester, MD      methocarbamol  500 mg Oral Q6H PRN Tanya Payne PA-C      ondansetron  4 mg Intravenous Q6H PRN Mina Sylvester, MD      oxyCODONE  2 5 mg Oral Q8H PRN Maile Barr PA-C      pantoprazole  40 mg Oral BID Mina Sylvester, MD      polyethylene glycol  17 g Oral Daily Maile Barr PA-C      pregabalin  50 mg Oral Daily Mina Higinio, MD      senna  2 tablet Oral HS PRN Mina Higinio, MD      sertraline  75 mg Oral Daily Mina Brand, MD      sevelamer  1,600 mg Oral TID With Meals Mina Sylvester, MD      warfarin  9 mg Oral Daily (warfarin) Tanya Payne PA-C          Today, Patient Was Seen By: Kirt Dominguez PA-C    **Please Note: This note may have been constructed using a voice recognition system  **

## 2022-09-01 ENCOUNTER — APPOINTMENT (INPATIENT)
Dept: DIALYSIS | Facility: HOSPITAL | Age: 57
DRG: 070 | End: 2022-09-01
Payer: MEDICARE

## 2022-09-01 LAB
ANION GAP SERPL CALCULATED.3IONS-SCNC: 4 MMOL/L (ref 4–13)
BUN SERPL-MCNC: 28 MG/DL (ref 5–25)
CALCIUM SERPL-MCNC: 8.8 MG/DL (ref 8.3–10.1)
CHLORIDE SERPL-SCNC: 101 MMOL/L (ref 96–108)
CO2 SERPL-SCNC: 32 MMOL/L (ref 21–32)
CREAT SERPL-MCNC: 8.05 MG/DL (ref 0.6–1.3)
GFR SERPL CREATININE-BSD FRML MDRD: 5 ML/MIN/1.73SQ M
GLUCOSE SERPL-MCNC: 120 MG/DL (ref 65–140)
GLUCOSE SERPL-MCNC: 123 MG/DL (ref 65–140)
GLUCOSE SERPL-MCNC: 140 MG/DL (ref 65–140)
GLUCOSE SERPL-MCNC: 168 MG/DL (ref 65–140)
GLUCOSE SERPL-MCNC: 191 MG/DL (ref 65–140)
INR PPP: 1.54 (ref 0.84–1.19)
POTASSIUM SERPL-SCNC: 4.4 MMOL/L (ref 3.5–5.3)
PROTHROMBIN TIME: 18.8 SECONDS (ref 11.6–14.5)
SODIUM SERPL-SCNC: 137 MMOL/L (ref 135–147)

## 2022-09-01 PROCEDURE — 5A1D70Z PERFORMANCE OF URINARY FILTRATION, INTERMITTENT, LESS THAN 6 HOURS PER DAY: ICD-10-PCS | Performed by: INTERNAL MEDICINE

## 2022-09-01 PROCEDURE — 85610 PROTHROMBIN TIME: CPT | Performed by: PHYSICIAN ASSISTANT

## 2022-09-01 PROCEDURE — 90935 HEMODIALYSIS ONE EVALUATION: CPT | Performed by: INTERNAL MEDICINE

## 2022-09-01 PROCEDURE — 99232 SBSQ HOSP IP/OBS MODERATE 35: CPT | Performed by: PHYSICIAN ASSISTANT

## 2022-09-01 PROCEDURE — 80048 BASIC METABOLIC PNL TOTAL CA: CPT | Performed by: PHYSICIAN ASSISTANT

## 2022-09-01 PROCEDURE — 82948 REAGENT STRIP/BLOOD GLUCOSE: CPT

## 2022-09-01 RX ADMIN — ALPRAZOLAM 0.25 MG: 0.25 TABLET ORAL at 08:21

## 2022-09-01 RX ADMIN — SERTRALINE HYDROCHLORIDE 75 MG: 50 TABLET ORAL at 11:48

## 2022-09-01 RX ADMIN — PANTOPRAZOLE SODIUM 40 MG: 40 TABLET, DELAYED RELEASE ORAL at 17:41

## 2022-09-01 RX ADMIN — IRON SUCROSE 50 MG: 20 INJECTION, SOLUTION INTRAVENOUS at 11:38

## 2022-09-01 RX ADMIN — SEVELAMER HYDROCHLORIDE 1600 MG: 800 TABLET ORAL at 17:47

## 2022-09-01 RX ADMIN — GABAPENTIN 100 MG: 100 CAPSULE ORAL at 11:47

## 2022-09-01 RX ADMIN — LIDOCAINE 5% 1 PATCH: 700 PATCH TOPICAL at 11:37

## 2022-09-01 RX ADMIN — GABAPENTIN 100 MG: 100 CAPSULE ORAL at 17:39

## 2022-09-01 RX ADMIN — HEPARIN SODIUM 5000 UNITS: 5000 INJECTION INTRAVENOUS; SUBCUTANEOUS at 06:04

## 2022-09-01 RX ADMIN — DOCUSATE SODIUM 100 MG: 100 CAPSULE, LIQUID FILLED ORAL at 17:41

## 2022-09-01 RX ADMIN — PREGABALIN 50 MG: 50 CAPSULE ORAL at 11:49

## 2022-09-01 RX ADMIN — GABAPENTIN 100 MG: 100 CAPSULE ORAL at 21:59

## 2022-09-01 RX ADMIN — HEPARIN SODIUM 5000 UNITS: 5000 INJECTION INTRAVENOUS; SUBCUTANEOUS at 13:37

## 2022-09-01 RX ADMIN — METHOCARBAMOL 500 MG: 500 TABLET ORAL at 13:37

## 2022-09-01 RX ADMIN — MENTHOL, METHYL SALICYLATE: 10; 15 CREAM TOPICAL at 17:49

## 2022-09-01 RX ADMIN — LORATADINE 5 MG: 10 TABLET ORAL at 11:49

## 2022-09-01 RX ADMIN — ACETAMINOPHEN 975 MG: 325 TABLET ORAL at 21:59

## 2022-09-01 RX ADMIN — HEPARIN SODIUM 5000 UNITS: 5000 INJECTION INTRAVENOUS; SUBCUTANEOUS at 22:00

## 2022-09-01 RX ADMIN — ACETAMINOPHEN 975 MG: 325 TABLET ORAL at 13:37

## 2022-09-01 RX ADMIN — Medication 1 CAPSULE: at 17:39

## 2022-09-01 RX ADMIN — SEVELAMER HYDROCHLORIDE 1600 MG: 800 TABLET ORAL at 11:52

## 2022-09-01 RX ADMIN — ACETAMINOPHEN 975 MG: 325 TABLET ORAL at 06:04

## 2022-09-01 RX ADMIN — CALCITRIOL CAPSULES 0.25 MCG 0.25 MCG: 0.25 CAPSULE ORAL at 11:47

## 2022-09-01 RX ADMIN — ATORVASTATIN CALCIUM 20 MG: 20 TABLET, FILM COATED ORAL at 17:39

## 2022-09-01 RX ADMIN — PANTOPRAZOLE SODIUM 40 MG: 40 TABLET, DELAYED RELEASE ORAL at 11:47

## 2022-09-01 RX ADMIN — DOCUSATE SODIUM 100 MG: 100 CAPSULE, LIQUID FILLED ORAL at 11:47

## 2022-09-01 RX ADMIN — POLYETHYLENE GLYCOL 3350 17 G: 17 POWDER, FOR SOLUTION ORAL at 12:00

## 2022-09-01 RX ADMIN — LEVOTHYROXINE SODIUM 50 MCG: 50 TABLET ORAL at 06:04

## 2022-09-01 RX ADMIN — WARFARIN SODIUM 9 MG: 7.5 TABLET ORAL at 17:41

## 2022-09-01 RX ADMIN — CARVEDILOL 25 MG: 25 TABLET, FILM COATED ORAL at 17:48

## 2022-09-01 RX ADMIN — ALPRAZOLAM 0.25 MG: 0.25 TABLET ORAL at 21:59

## 2022-09-01 RX ADMIN — OXYCODONE HYDROCHLORIDE 2.5 MG: 5 TABLET ORAL at 13:37

## 2022-09-01 RX ADMIN — MENTHOL, METHYL SALICYLATE: 10; 15 CREAM TOPICAL at 22:05

## 2022-09-01 RX ADMIN — MELATONIN 3 MG: at 22:00

## 2022-09-01 NOTE — PROGRESS NOTES
[facility]  Progress Note Rosie Sharpe 1965, 56 y o  female MRN: [id number]  Unit/Bed#: CW2 210-01 Encounter: [id number]  Primary Care Provider: Nam Huffman MD   Date and time admitted to hospital: 8/30/2022 10:34 AM      DOS: 9/1/2022  * Lethargy  Assessment & Plan  · Patient was in her usual state of health, after finishing dialysis on day of admission when she had an episode of decreased level of consciousness  Patient states she was able to hear nurses calling her name but she could not answer  Per EMS no seizure-like activities and shortly she was back at her baseline mentation  She had a sharp right-sided back pain which she has been dealing with since last year  Per ED, it was not reported that she was hypotensive during this episode  · Unclear cause  ?  Transient hypotension which she had in the past during dialysis  Less likely seizure, no seizure-like activities reported per facility, and shortly she was back at her baseline mentation  Possibly pain related vs  Related to hypoglycemia   · Nonfocal neurologic exam  · CT head negative for acute pathology  · Telemetry discontinued as pt taking leads off, no events noted   · Pt with persistent hypoglycemia here, endocrinology consulted, lantus discontinued, SSI coverage PRN and trend, appreciate additional recommendations   · Decrease Prn oxycodone and d/c scheduled robaxin, adjust additional sedating medications as needed  · Possible metabolic/toxic encephalopathy in setting of hypoglycemia, sedating medications treated with D50, adjusting medications and monitoring mental status   · Mentation is improved  · Pt's significant other concerned regarding some forgetfulness/memory loss issues and tremors of the bilateral hands, reports parkinsons runs in the patient's family   Recommended outpatient neurology/cognitive testing     Type 2 diabetes mellitus [facility])  Assessment & Plan  Lab Results   Component Value Date    HGBA1C 9 4 (H) 07/09/2022       Recent Labs     08/31/22  1714 08/31/22  2116 09/01/22  0528 09/01/22  1154   POCGLU 130 178* 140 120       Blood Sugar Average: Last 72 hrs:  (P) 110 [id number]   · On Lantus 12 units HS and NovoLog sliding scale at home   · Noted to have persistent hypoglycemia while hospitalized   · Lantus discontinued, continue PRN SSi coverage for now  · Endocrinology following, appreciate recommendations  · Encourage PO intake   · QID glucose checks  · Received 2 amps D50 8/31  · Diabetic diet  · Could be contributing to patient's overall presentation     Lumbar radiculopathy  Assessment & Plan  · Previous provider reviewed note from neuro surgery 08/04/2022  · Low back pain probably/likely facet joint related  Multiple admissions and ED visits for right-sided back pain that radiates to her legs, more on the right side  Prior MRI demonstrated moderately advanced degenerative lumbar spine disease at multi levels  L4-5 disc herniation more on the right with moderate central canal stenosis  MRI with questionable L4-L5 osteomyelitis/diskitis per neuro surgery and ID highly unlikely  Underwent bilateral L3, L4-L5 dorsal ramus block at [facility] in May 2022 with no improvement in symptoms  · Due to recent fall prior to her previous admission at the beginning of August CT of the right leg was ordered in the ED  No acute displaced fracture, mild osteoarthrosis  Possible cystitis   · Due to possible cystitis on CT scan, UA ordered and not yet obtained  Patient makes minimal urine  Denies dysuria    · Conservative measures with aqua k pad, bengay, Lyrica  · Continue decreased PRN oxycodone to 2 5 mg and PRN Robaxin, improvement in mentation noted today  · PT/OT recommending STR which patient is in agreement with     Elevated troponin  Assessment & Plan  · Suspect non MI troponin elevation secondary to hypertensive urgency on admission   · Also with underlying ESRD on HD  · Denies any chest pain palpitation or shortness of breath, EKG without acute ischemic changes    Constipation  Assessment & Plan  · Continue daily miralax and colace  · Monitor stool output    Pulmonary embolus (HCC)  Assessment & Plan  · History of DVT and PE 6 years ago  · On Coumadin, subtherapeutic on admission, questionable compliance, does not seem to know which medications she is taking at home   · Maintained on Coumadin 9 mg daily on PTA list   · S/p Coumadin 10 mg/9 mg here  · INR improved to 1 54 today, continue at home dose 9 mg daily   · Goal INR 2-3   · No indication to bridge for now unless INR does not improve     Acquired hypothyroidism  Assessment & Plan  · Continue levothyroxine 50 mcg daily    Anemia of chronic disease  Assessment & Plan  · Secondary to end-stage renal disease  · Hemoglobin at baseline at 8 3    Esophageal reflux  Assessment & Plan  · Continue PPI    Anxiety disorder  Assessment & Plan  · Continue Xanax 0 25 mg b i d  p r n , hold for sedation   · Continue Zoloft 75 mg daily    ESRD on hemodialysis  Assessment & Plan  Lab Results   Component Value Date    EGFR 5 09/01/2022    EGFR 6 08/31/2022    EGFR 10 08/30/2022    CREATININE 8 05 (H) 09/01/2022    CREATININE 6 46 (H) 08/31/2022    CREATININE 4 35 (H) 08/30/2022   · On dialysis TTS  · Nephrology consulted for HD management, plan for HD today    Essential hypertension  Assessment & Plan  · Hypertensive urgency noted on admission, likely in setting of missing dose of coreg on day of admit   · Overall much improved, BP stabilized   · Continue Coreg 25 mg BID  · Torsemide and nifedipine were discontinued on previous admission 8/15 due to hypotension  · Monitor       VTE Pharmacologic Prophylaxis: VTE Score: 3 Moderate Risk (Score 3-4) - Pharmacological DVT Prophylaxis Ordered: heparin      Patient Centered Rounds: I evaluated the patient without nursing staff present due to speaking to nurse outside patient's room   Discussions with Specialists or Other Care Team Provider: Discussed with nephrology, Rn, CM and reviewed previous notes     Education and Discussions with Family / Patient: Updated  (significant other ) at bedside  Time Spent for Care: 20 minutes  More than 50% of total time spent on counseling and coordination of care as described above  Current Length of Stay: 1 day(s)  Current Patient Status: Inpatient   Certification Statement: The patient will continue to require additional inpatient hospital stay due to STR placement   Discharge Plan: Anticipate discharge in 24-48 hrs to rehab facility  Code Status: Level 1 - Full Code    Subjective:   Pt reports that she continues to have right sided back and leg pain  Pt's significant other is concerned about her memory loss and hand tremors, concerned for parkinsons  Discussed that there is no imaging or lab work testing to confirm this diagnosis and that she should follow up with neurology for cognitive testing outpatient  Currently pt is in agreement to rehab  Objective:     Vitals:   Temp (24hrs), Av 5 °F (36 9 °C), Min:97 9 °F (36 6 °C), Max:98 9 °F (37 2 °C)    Temp:  [97 9 °F (36 6 °C)-98 9 °F (37 2 °C)] 98 7 °F (37 1 °C)  HR:  [66-80] 80  Resp:  [16-20] 18  BP: (103-196)/() 105/61  SpO2:  [87 %-92 %] 87 %  There is no height or weight on file to calculate BMI  Input and Output Summary (last 24 hours): Intake/Output Summary (Last 24 hours) at 2022 1538  Last data filed at 2022 1100  Gross per 24 hour   Intake 1490 ml   Output 2719 ml   Net -1229 ml       Physical Exam:   Physical Exam  Vitals reviewed  Constitutional:       General: She is not in acute distress  Appearance: She is not toxic-appearing  Comments: Pt is in no acute distress lying in her hospital bed resting comfortably    HENT:      Head: Normocephalic and atraumatic  Cardiovascular:      Rate and Rhythm: Normal rate and regular rhythm        Pulses: Normal pulses  Pulmonary:      Effort: Pulmonary effort is normal  No respiratory distress  Abdominal:      General: Bowel sounds are normal  There is no distension  Palpations: Abdomen is soft  Musculoskeletal:      Right lower leg: No edema  Left lower leg: No edema  Skin:     General: Skin is warm and dry  Neurological:      Mental Status: She is alert  Psychiatric:         Mood and Affect: Mood normal           Additional Data:     Labs:  Results from last 7 days   Lab Units 08/30/22  1123   WBC Thousand/uL 6 16   HEMOGLOBIN g/dL 8 3*   HEMATOCRIT % 26 6*   PLATELETS Thousands/uL 187   NEUTROS PCT % 70   LYMPHS PCT % 17   MONOS PCT % 9   EOS PCT % 2     Results from last 7 days   Lab Units 09/01/22  0822 08/31/22  1022 08/30/22  1123   SODIUM mmol/L 137   < > 136   POTASSIUM mmol/L 4 4   < > 3 6   CHLORIDE mmol/L 101   < > 99   CO2 mmol/L 32   < > 30   BUN mg/dL 28*   < > 14   CREATININE mg/dL 8 05*   < > 4 35*   ANION GAP mmol/L 4   < > 7   CALCIUM mg/dL 8 8   < > 9 6   ALBUMIN g/dL  --   --  3 1*   TOTAL BILIRUBIN mg/dL  --   --  0 80   ALK PHOS U/L  --   --  102   ALT U/L  --   --  17   AST U/L  --   --  18   GLUCOSE RANDOM mg/dL 123   < > 103    < > = values in this interval not displayed  Results from last 7 days   Lab Units 09/01/22  0822   INR  1 54*     Results from last 7 days   Lab Units 09/01/22  1154 09/01/22  0528 08/31/22  2116 08/31/22  1714 08/31/22  1213 08/31/22  1141 08/31/22  0731 08/31/22  0657 08/30/22  2102 08/30/22  1621 08/30/22  1049   POC GLUCOSE mg/dl 120 140 178* 130 162* 38* 48* 58* 129 98 110               Lines/Drains:  Invasive Devices  Report    Peripheral Intravenous Line  Duration           Peripheral IV 08/31/22 Right Antecubital <1 day          Line  Duration           Hemodialysis AV Fistula 02/20/18 Left Forearm 1653 days                      Imaging: No pertinent imaging reviewed      Recent Cultures (last 7 days):         Last 24 Hours Medication List:   Current Facility-Administered Medications   Medication Dose Route Frequency Provider Last Rate    acetaminophen  650 mg Oral Q6H PRN Zane Hernandez MD      acetaminophen  975 mg Oral Q8H [address] Zane Hernandez MD      albuterol  2 puff Inhalation Q6H PRN Zane Hernandez MD      ALPRAZolam  0 25 mg Oral BID PRN Zane Hernandez MD      atorvastatin  20 mg Oral Daily With Howard Payton MD      b complex-vitamin C-folic acid  1 capsule Oral Daily With Howard Payton MD      calcitriol  0 25 mcg Oral Once per day on Tue Thu Sat LOIS Fletcher      carvedilol  25 mg Oral BID Zane Hernandez MD      docusate sodium  100 mg Oral BID Lucas Filter, PA-C      gabapentin  100 mg Oral TID Zane Hernandez MD      heparin (porcine)  5,000 Units Subcutaneous Q8H [address] Zane Hernandez MD      hydrALAZINE  10 mg Intravenous Q6H PRN Zane Hernandez MD      hydrOXYzine HCL  25 mg Oral Q6H PRN Zane Hernandez MD      iron sucrose  50 mg Intravenous After Dialysis Nena Sorenson MD      levothyroxine  50 mcg Oral Early Morning Zane Hernandez MD      lidocaine  1 patch Topical Daily Zane Hernandez MD      loratadine  5 mg Oral Daily Zane Hernandez MD      melatonin  3 mg Oral HS Zane Hernandez MD      menthol-methyl salicylate   Apply externally 4x Daily PRN Zane Hernandez MD      methocarbamol  500 mg Oral Q6H PRN Lucas Filter, PA-C      ondansetron  4 mg Intravenous Q6H PRN Zane Hernandez MD      oxyCODONE  2 5 mg Oral Q8H PRN Maile Barr, PA-C      pantoprazole  40 mg Oral BID Zane Hernandez MD      polyethylene glycol  17 g Oral Daily Lucas Filter, PA-C      pregabalin  50 mg Oral Daily Zane Hernandez MD      senna  2 tablet Oral HS PRN Zane Hernandez MD      sertraline  75 mg Oral Daily Zane Hernandez MD      sevelamer  1,600 mg Oral TID With Meals Zane Hernandez MD      warfarin  9 mg Oral Daily (warfarin) [facility] Venus Dawn PA-C          Today, Patient Was Seen By: Idalia Roque PA-C    **Please Note: This note may have been constructed using a voice recognition system  **

## 2022-09-01 NOTE — PROGRESS NOTES
NEPHROLOGY PROGRESS NOTE   Whit Novoa 56 y o  female MRN: [id number]  Unit/Bed#: CW2 210-01 Encounter: [id number]      ASSESSMENT & PLAN:  1  ESRD on HD TTS at [facility] [place]  Patient seen and examined during dialysis following outpatient schedule/orders  Continue UF to bring her to her dry weight, cautious with inter dialytic hypotension  Patient requested to shorter her treatment because is not feeling well, discussed with patient about importance to have a full treatment    2  Altered mental status, reported decreased level of consciousness after dialysis 2 days ago, further workup per primary team     3  Hypoglycemia, type 2 diabetes, endocrinology on board    4  Hemodynamics, blood pressure stable, continue current regimen, continue with UF on dialysis    5  Anemia secondary to chronic kidney disease, monitor H&H and transfusion for hemoglobin less than 7  No MITCHELL due to history of PE    6  Mineral bone disease, continue with renal diet and binders with meals    7  Access, left upper extremity AV fistula cannulated with good blood flow    SUBJECTIVE:  Patient seen and examined during dialysis treatment, patient is still feeling tired and weak, right-sided back/flank pain improving, denies any nausea, no vomiting, no chest pain, no shortness of breath    Patient requested have a shorter hemodialysis treatment because she is not feeling well    OBJECTIVE:  Current Weight:    Vitals:    09/01/22 0930   BP: 170/91   Pulse: 70   Resp: 20   Temp:    SpO2:        Intake/Output Summary (Last 24 hours) at 9/1/2022 0935  Last data filed at 9/1/2022 0830  Gross per 24 hour   Intake 900 ml   Output --   Net 900 ml     General: conscious, cooperative, in not acute distress  Eyes: conjunctivae pink, anicteric sclerae  ENT: lips and mucous membranes moist  Neck: supple, no JVD  Chest: clear breath sounds bilateral, no crackles, ronchus or wheezings  CVS: distinct S1 & S2, normal rate, regular rhythm  Abdomen:  Obese, non-tender, non-distended, normoactive bowel sounds  Extremities: no significant edema of both legs, left upper extremity AV fistula cannulated with good blood flow  Skin: no rash  Neuro: awake, alert, oriented        Medications:    Current Facility-Administered Medications:     acetaminophen (TYLENOL) tablet 650 mg, 650 mg, Oral, Q6H PRN, Dyan Cedeno MD, 650 mg at 08/30/22 2303    acetaminophen (TYLENOL) tablet 975 mg, 975 mg, Oral, Q8H [facility], Lottie Oliveira MD, 975 mg at 09/01/22 0604    albuterol (PROVENTIL HFA,VENTOLIN HFA) inhaler 2 puff, 2 puff, Inhalation, Q6H PRN, Dyan Cedeno MD    ALPRAZolam Sasha Ready) tablet 0 25 mg, 0 25 mg, Oral, BID PRN, Dyan Cedeno MD, 0 25 mg at 09/01/22 3156    atorvastatin (LIPITOR) tablet 20 mg, 20 mg, Oral, Daily With Dinner, Dyan Cedeno MD, 20 mg at 08/31/22 1733    b complex-vitamin C-folic acid (NEPHROCAPS) capsule 1 capsule, 1 capsule, Oral, Daily With Dinner, Dyan Cedeno MD, 1 capsule at 08/31/22 1732    calcitriol (ROCALTROL) capsule 0 25 mcg, 0 25 mcg, Oral, Once per day on Tue Thu SatUte CRNP    carvedilol (COREG) tablet 25 mg, 25 mg, Oral, BID, Dyan Cedeno MD, 25 mg at 08/31/22 1733    docusate sodium (COLACE) capsule 100 mg, 100 mg, Oral, BID, Villa Soler PA-C, 100 mg at 08/31/22 1733    gabapentin (NEURONTIN) capsule 100 mg, 100 mg, Oral, TID, Dyan Cedeno MD, 100 mg at 08/31/22 2138    heparin (porcine) subcutaneous injection 5,000 Units, 5,000 Units, Subcutaneous, Q8H [facility], Dyan Cedeno MD, 5,000 Units at 09/01/22 0604    hydrALAZINE (APRESOLINE) injection 10 mg, 10 mg, Intravenous, Q6H PRN, Dyan Cedeno MD    hydrOXYzine HCL (ATARAX) tablet 25 mg, 25 mg, Oral, Q6H PRN, Dyan Cedeno MD, 25 mg at 08/30/22 1834    insulin lispro (HumaLOG) 100 units/mL subcutaneous injection 1-5 Units, 1-5 Units, Subcutaneous, TID AC **AND** Fingerstick Glucose (POCT), , , TID AC, Barb Ray Marrian Severe, MD    iron sucrose (VENOFER) 50 mg in sodium chloride 0 9 % 100 mL IVPB, 50 mg, Intravenous, Weekly, LOIS Harper    levothyroxine tablet 50 mcg, 50 mcg, Oral, Early Morning, Mina Sylvester MD, 50 mcg at 09/01/22 0604    lidocaine (LIDODERM) 5 % patch 1 patch, 1 patch, Topical, Daily, Mina Sylvester MD, 1 patch at 08/30/22 1612    loratadine (CLARITIN) tablet 5 mg, 5 mg, Oral, Daily, Mina Sylvester MD, 5 mg at 08/31/22 0850    melatonin tablet 3 mg, 3 mg, Oral, HS, Lottie Oliveira MD, 3 mg at 08/31/22 2138    menthol-methyl salicylate (BENGAY) 24-02 % cream, , Apply externally, 4x Daily PRN, Mina Sylvester MD    methocarbamol (ROBAXIN) tablet 500 mg, 500 mg, Oral, Q6H PRN, Tanya Payne PA-C    ondansetron (ZOFRAN) injection 4 mg, 4 mg, Intravenous, Q6H PRN, Mina Sylvester MD    oxyCODONE (ROXICODONE) IR tablet 2 5 mg, 2 5 mg, Oral, Q8H PRN, Tanya Payne PA-C    pantoprazole (PROTONIX) EC tablet 40 mg, 40 mg, Oral, BID, Lottie Oliveira MD, 40 mg at 08/31/22 1732    polyethylene glycol (MIRALAX) packet 17 g, 17 g, Oral, Daily, Tanya Payne PA-C, 17 g at 08/31/22 1740    pregabalin (LYRICA) capsule 50 mg, 50 mg, Oral, Daily, Lottie Oliveira MD, 50 mg at 08/31/22 0851    senna (SENOKOT) tablet 17 2 mg, 2 tablet, Oral, HS PRN, Mina Sylvester MD    sertraline (ZOLOFT) tablet 75 mg, 75 mg, Oral, Daily, Lottie Oliveria MD, 75 mg at 08/31/22 0851    sevelamer (RENAGEL) tablet 1,600 mg, 1,600 mg, Oral, TID With Meals, Mina Sylvester MD, 1,600 mg at 08/31/22 1734    warfarin (COUMADIN) tablet 9 mg, 9 mg, Oral, Daily (warfarin), Tanya Payne PA-C, 9 mg at 08/31/22 1732    Invasive Devices:        Lab Results:   Results from last 7 days   Lab Units 09/01/22  0822 08/31/22  1022 08/30/22  1123   WBC Thousand/uL  --   --  6 16   HEMOGLOBIN g/dL  --   --  8 3*   HEMATOCRIT %  --   --  26 6*   PLATELETS Thousands/uL  --   --  187   SODIUM mmol/L 137 135 136   POTASSIUM mmol/L 4 4 3 8 3 6   CHLORIDE mmol/L 101 100 99   CO2 mmol/L 32 31 30   BUN mg/dL 28* 19 14   CREATININE mg/dL 8 05* 6 46* 4 35*   CALCIUM mg/dL 8 8 9 1 9 6   MAGNESIUM mg/dL  --  2 3  --    ALK PHOS U/L  --   --  102   ALT U/L  --   --  17   AST U/L  --   --  18       Previous work up:  See previous notes      Portions of the record may have been created with voice recognition software  Occasional wrong word or "sound a like" substitutions may have occurred due to the inherent limitations of voice recognition software  Read the chart carefully and recognize, using context, where substitutions have occurred  If you have any questions, please contact the dictating provider

## 2022-09-01 NOTE — PLAN OF CARE
Problem: MOBILITY - ADULT  Goal: Maintain or return to baseline ADL function  Description: INTERVENTIONS:  -  Assess patient's ability to carry out ADLs; assess patient's baseline for ADL function and identify physical deficits which impact ability to perform ADLs (bathing, care of mouth/teeth, toileting, grooming, dressing, etc )  - Assess/evaluate cause of self-care deficits   - Assess range of motion  - Assess patient's mobility; develop plan if impaired  - Assess patient's need for assistive devices and provide as appropriate  - Encourage maximum independence but intervene and supervise when necessary  - Involve family in performance of ADLs  - Assess for home care needs following discharge   - Consider OT consult to assist with ADL evaluation and planning for discharge  - Provide patient education as appropriate  Outcome: Progressing  Goal: Maintains/Returns to pre admission functional level  Description: INTERVENTIONS:  - Perform BMAT or MOVE assessment daily    - Set and communicate daily mobility goal to care team and patient/family/caregiver  - Collaborate with rehabilitation services on mobility goals if consulted  - Perform Range of Motion 3 times a day  - Reposition patient every 2 hours    - Dangle patient 3 times a day  - Stand patient 3 times a day  - Ambulate patient 3 times a day  - Out of bed to chair 3 times a day   - Out of bed for meals 3 times a day  - Out of bed for toileting  - Record patient progress and toleration of activity level   Outcome: Progressing     Problem: PAIN - ADULT  Goal: Verbalizes/displays adequate comfort level or baseline comfort level  Description: Interventions:  - Encourage patient to monitor pain and request assistance  - Assess pain using appropriate pain scale  - Administer analgesics based on type and severity of pain and evaluate response  - Implement non-pharmacological measures as appropriate and evaluate response  - Consider cultural and social influences on pain and pain management  - Notify physician/advanced practitioner if interventions unsuccessful or patient reports new pain  Outcome: Progressing     Problem: INFECTION - ADULT  Goal: Absence or prevention of progression during hospitalization  Description: INTERVENTIONS:  - Assess and monitor for signs and symptoms of infection  - Monitor lab/diagnostic results  - Monitor all insertion sites, i e  indwelling lines, tubes, and drains  - Monitor endotracheal if appropriate and nasal secretions for changes in amount and color  - [place] appropriate cooling/warming therapies per order  - Administer medications as ordered  - Instruct and encourage patient and family to use good hand hygiene technique  - Identify and instruct in appropriate isolation precautions for identified infection/condition  Outcome: Progressing  Goal: Absence of fever/infection during neutropenic period  Description: INTERVENTIONS:  - Monitor WBC    Outcome: Progressing     Problem: SAFETY ADULT  Goal: Maintain or return to baseline ADL function  Description: INTERVENTIONS:  -  Assess patient's ability to carry out ADLs; assess patient's baseline for ADL function and identify physical deficits which impact ability to perform ADLs (bathing, care of mouth/teeth, toileting, grooming, dressing, etc )  - Assess/evaluate cause of self-care deficits   - Assess range of motion  - Assess patient's mobility; develop plan if impaired  - Assess patient's need for assistive devices and provide as appropriate  - Encourage maximum independence but intervene and supervise when necessary  - Involve family in performance of ADLs  - Assess for home care needs following discharge   - Consider OT consult to assist with ADL evaluation and planning for discharge  - Provide patient education as appropriate  Outcome: Progressing  Goal: Maintains/Returns to pre admission functional level  Description: INTERVENTIONS:  - Perform BMAT or MOVE assessment daily    - Set and communicate daily mobility goal to care team and patient/family/caregiver  - Collaborate with rehabilitation services on mobility goals if consulted  - Perform Range of Motion 3 times a day  - Reposition patient every 2 hours    - Dangle patient 3 times a day  - Stand patient 3 times a day  - Ambulate patient 3 times a day  - Out of bed to chair 3 times a day   - Out of bed for meals 3 times a day  - Out of bed for toileting  - Record patient progress and toleration of activity level   Outcome: Progressing  Goal: Patient will remain free of falls  Description: INTERVENTIONS:  - Educate patient/family on patient safety including physical limitations  - Instruct patient to call for assistance with activity   - Consult OT/PT to assist with strengthening/mobility   - Keep Call bell within reach  - Keep bed low and locked with side rails adjusted as appropriate  - Keep care items and personal belongings within reach  - Initiate and maintain comfort rounds  - Make Fall Risk Sign visible to staff  - Offer Toileting every 2 Hours, in advance of need  - Initiate/Maintain alarm  - Obtain necessary fall risk management equipment  - Apply yellow socks and bracelet for high fall risk patients  - Consider moving patient to room near nurses station  Outcome: Progressing     Problem: DISCHARGE PLANNING  Goal: Discharge to home or other facility with appropriate resources  Description: INTERVENTIONS:  - Identify barriers to discharge w/patient and caregiver  - Arrange for needed discharge resources and transportation as appropriate  - Identify discharge learning needs (meds, wound care, etc )  - Arrange for interpretive services to assist at discharge as needed  - Refer to Case Management Department for coordinating discharge planning if the patient needs post-hospital services based on physician/advanced practitioner order or complex needs related to functional status, cognitive ability, or social support system  Outcome: Progressing     Problem: Knowledge Deficit  Goal: Patient/family/caregiver demonstrates understanding of disease process, treatment plan, medications, and discharge instructions  Description: Complete learning assessment and assess knowledge base    Interventions:  - Provide teaching at level of understanding  - Provide teaching via preferred learning methods  Outcome: Progressing     Problem: Potential for Falls  Goal: Patient will remain free of falls  Description: INTERVENTIONS:  - Educate patient/family on patient safety including physical limitations  - Instruct patient to call for assistance with activity   - Consult OT/PT to assist with strengthening/mobility   - Keep Call bell within reach  - Keep bed low and locked with side rails adjusted as appropriate  - Keep care items and personal belongings within reach  - Initiate and maintain comfort rounds  - Make Fall Risk Sign visible to staff  - Offer Toileting every 2 Hours, in advance of need  - Initiate/Maintain alarm  - Obtain necessary fall risk management equipment  - Apply yellow socks and bracelet for high fall risk patients  - Consider moving patient to room near nurses station  Outcome: Progressing     Problem: Prexisting or High Potential for Compromised Skin Integrity  Goal: Skin integrity is maintained or improved  Description: INTERVENTIONS:  - Identify patients at risk for skin breakdown  - Assess and monitor skin integrity  - Assess and monitor nutrition and hydration status  - Monitor labs   - Assess for incontinence   - Turn and reposition patient  - Assist with mobility/ambulation  - Relieve pressure over bony prominences  - Avoid friction and shearing  - Provide appropriate hygiene as needed including keeping skin clean and dry  - Evaluate need for skin moisturizer/barrier cream  - Collaborate with interdisciplinary team   - Patient/family teaching  - Consider wound care consult   Outcome: Progressing     Problem: METABOLIC, FLUID AND ELECTROLYTES - ADULT  Goal: Electrolytes maintained within normal limits  Description: INTERVENTIONS:  - Monitor labs and assess patient for signs and symptoms of electrolyte imbalances  - Administer electrolyte replacement as ordered  - Monitor response to electrolyte replacements, including repeat lab results as appropriate  - Instruct patient on fluid and nutrition as appropriate  Outcome: Progressing  Goal: Fluid balance maintained  Description: INTERVENTIONS:  - Monitor labs   - Monitor I/O and WT  - Instruct patient on fluid and nutrition as appropriate  - Assess for signs & symptoms of volume excess or deficit  Outcome: Progressing

## 2022-09-01 NOTE — ASSESSMENT & PLAN NOTE
· Patient was in her usual state of health, after finishing dialysis on day of admission when she had an episode of decreased level of consciousness  Patient states she was able to hear nurses calling her name but she could not answer  Per EMS no seizure-like activities and shortly she was back at her baseline mentation  She had a sharp right-sided back pain which she has been dealing with since last year  Per ED, it was not reported that she was hypotensive during this episode  · Unclear cause  ?  Transient hypotension which she had in the past during dialysis  Less likely seizure, no seizure-like activities reported per facility, and shortly she was back at her baseline mentation  Possibly pain related vs  Related to hypoglycemia   · Nonfocal neurologic exam  · CT head negative for acute pathology  · Telemetry discontinued as pt taking leads off, no events noted   · Pt with persistent hypoglycemia here, endocrinology consulted, lantus discontinued, SSI coverage PRN and trend, appreciate additional recommendations   · Decrease Prn oxycodone and d/c scheduled robaxin, adjust additional sedating medications as needed  · Possible metabolic/toxic encephalopathy in setting of hypoglycemia, sedating medications treated with D50, adjusting medications and monitoring mental status   · Mentation is improved  · Pt's significant other concerned regarding some forgetfulness/memory loss issues and tremors of the bilateral hands, reports parkinsons runs in the patient's family   Recommended outpatient neurology/cognitive testing

## 2022-09-01 NOTE — ASSESSMENT & PLAN NOTE
Lab Results   Component Value Date    HGBA1C 9 4 (H) 07/09/2022       Recent Labs     08/31/22  1714 08/31/22  2116 09/01/22  0528 09/01/22  1154   POCGLU 130 178* 140 120       Blood Sugar Average: Last 72 hrs:  (P) 110 [id number]   · On Lantus 12 units HS and NovoLog sliding scale at home   · Noted to have persistent hypoglycemia while hospitalized   · Lantus discontinued, continue PRN SSi coverage for now  · Endocrinology following, appreciate recommendations  · Encourage PO intake   · QID glucose checks  · Received 2 amps D50 8/31  · Diabetic diet  · Could be contributing to patient's overall presentation

## 2022-09-01 NOTE — CASE MANAGEMENT
Case Management Discharge Planning Note    Patient name Marcelino Khan  Location [facility] 210/CW2 575-85 MRN [id number]  : 1965 Date 2022       Current Admission Date: 2022  Current Admission Diagnosis:Lethargy   Patient Active Problem List    Diagnosis Date Noted    Lethargy 2022    Elevated troponin 2022    Intractable back pain 2022    Problem with vascular access 2022    Concern for Osteomyelitis/Discitis of lumbar region 2022    Fall 2022    Hypoxia 2022    COVID-19 2022    Right knee pain 2022    Constipation 2022    Pruritus 2021    Postop check 2021    Sigmoid diverticulitis 2021    Pneumonia 2021    Chronic anticoagulation 2021    Arteriovenous fistula for hemodialysis in place, primary ([facility] ) 2021    Healthcare-associated pneumonia 2021    Right foot pain 2021    A-V fistula ([facility] ) 2021    Chronic pain syndrome 2021    Bilateral primary osteoarthritis of knee 2021    Bilateral patellofemoral syndrome 2021    Tinea unguium 2020    S/P foot surgery, right 2020    History of claustrophobia 2020    Morbid obesity 2020    Hyperlipidemia 2020    Diabetic polyneuropathy associated with type 2 diabetes mellitus ([facility] ) 2020    Encounter for diabetic foot exam ([facility] ) 2020    Corns and callosities 2020    History of transmetatarsal amputation of right foot ([facility] ) 2020    Flu-like symptoms 2020    Lumbar radiculopathy 2020    Low back pain with sciatica 2020    Hemodialysis-associated hypotension 2019    Hyponatremia 2019    Parenchymal renal hypertension 2019    Candidiasis of breast 2019    Hyperkalemia 2019    Anemia of chronic disease 2019    Disruption of internal surgical wound 2019    Dyspnea 2019    Secondary hyperparathyroidism of renal origin ([facility] ) 04/27/2019    Nausea and vomiting 04/26/2019    Meningioma ([place] [address] ) 04/18/2019    Concussion without loss of consciousness 03/15/2019    Colon cancer screening 03/05/2019    Gastroesophageal reflux disease 03/05/2019    Primary osteoarthritis of right knee 10/25/2018    Hemodialysis status ([place] [address] ) 10/23/2018    Knee pain 10/22/2018    Anxiety disorder 04/06/2017    Acquired hypothyroidism 07/22/2016    Glaucoma 07/01/2016    ESRD on hemodialysis 07/01/2016    Abnormal uterine bleeding 02/15/2016    History of venous thromboembolism 02/14/2016    Pulmonary embolus ([place] [address] ) 10/30/2015    Cervical dysplasia 05/03/2015    Chronic endometritis 10/17/2014    Tinea pedis 10/02/2014    Benign neoplasm of skin 09/25/2014    Type 2 diabetes mellitus ([place] [address] ) 09/04/2014    Phlebitis and thrombophlebitis of superficial vessels of lower extremities 04/10/2014    Limb pain 11/25/2013    Mononeuritis of upper limb, unspecified laterality 11/25/2013    Legal blindness, as defined in [place] [facility] 19/28/0144    Complication from renal dialysis device 04/22/2013    Essential hypertension 10/15/2012    Cardiomyopathy ([place] [address] ) 09/26/2012    Esophageal reflux 08/20/2012    Allergic rhinitis 08/20/2012      LOS (days): 1  Geometric Mean LOS (GMLOS) (days): 2 90  Days to GMLOS:1 9     OBJECTIVE:  Risk of Unplanned Readmission Score: 61 66         Current admission status: Inpatient   Preferred Pharmacy:   [id number] - [address] [facility]  [address]  [address] [place] [address]  Phone: [phone] Fax: [phone] [facility], [address] [place] [address]  Phone: [phone] Fax: [phone]    Primary Care Provider: Anamaria Castillo MD    Primary Insurance: MEDICARE  Secondary Insurance: Marie Molina [facility]    DISCHARGE DETAILS:    Pt is unfortunately not a candidate for acute rehab, per admissions representative at [facility], thus SNF placement must be explored as an alternative  Of note, pt was accepted by 2 facilities (of a total of 12 referrals placed) per [facility], specifically [facility] and [facility]  CM spoke with pt at bedside to discuss same and review choices -- Freedom of Choice list printed via [employer] for pt's record  Pt presents as very displeased regarding her ARC denial, stating she disagrees with the determination and would like to remain at [facility] for inpatient therapies  CM continued to review that pt does not qualify, thus alternative placements must be explored  CM reviewed [facility] and [facility] as possibilities and provided a hand-out for review -- Pt states she will review with her significant other and make a decision upon his arrival     Shortly thereafter, CM met with pt and significant other to review choices -- They would like to accept the bed at [facility])  Significant other states he would provide transportation to/from pt's dialysis appts (T/Th/Sat @ [employer] 5:45am chairtime)  Per Kamron Moody , pt's bed would not be available until Monday, however she will inquire at tomorrow's morning meeting if a bed may become available sooner  Per Chantal @ [facility], there is a bed available today, however it is first come/first serve and pt is not yet medically stable for discharge -- [facility] is awaiting the endocrine's recommendation re: insulin, per Mari Isabel (LILLIANA)  CM will follow for [facility] bed availability and plan for discharge accordingly  CM will remain available  Pt confirmed she is covid vaccinated x3 -- [facility] into her Epic chart  S/o states he will bring her vaccine card to make a copy for her record

## 2022-09-01 NOTE — ASSESSMENT & PLAN NOTE
· Hypertensive urgency noted on admission, likely in setting of missing dose of coreg on day of admit   · Overall much improved, BP stabilized   · Continue Coreg 25 mg BID  · Torsemide and nifedipine were discontinued on previous admission 8/15 due to hypotension  · Monitor

## 2022-09-01 NOTE — ASSESSMENT & PLAN NOTE
Lab Results   Component Value Date    EGFR 5 09/01/2022    EGFR 6 08/31/2022    EGFR 10 08/30/2022    CREATININE 8 05 (H) 09/01/2022    CREATININE 6 46 (H) 08/31/2022    CREATININE 4 35 (H) 08/30/2022   · On dialysis TTS  · Nephrology consulted for HD management, plan for HD today

## 2022-09-01 NOTE — ARC ADMISSION
Reviewed patient's case with [place] physician - patient is recommended for SNF rehab vs home with home health services and family support  CM has been updated

## 2022-09-01 NOTE — PLAN OF CARE
Problem: PAIN - ADULT  Goal: Verbalizes/displays adequate comfort level or baseline comfort level  Description: Interventions:  - Encourage patient to monitor pain and request assistance  - Assess pain using appropriate pain scale  - Administer analgesics based on type and severity of pain and evaluate response  - Implement non-pharmacological measures as appropriate and evaluate response  - Consider cultural and social influences on pain and pain management  - Notify physician/advanced practitioner if interventions unsuccessful or patient reports new pain  Outcome: Progressing     Problem: INFECTION - ADULT  Goal: Absence or prevention of progression during hospitalization  Description: INTERVENTIONS:  - Assess and monitor for signs and symptoms of infection  - Monitor lab/diagnostic results  - Monitor all insertion sites, i e  indwelling lines, tubes, and drains  - Monitor endotracheal if appropriate and nasal secretions for changes in amount and color  - [employer] appropriate cooling/warming therapies per order  - Administer medications as ordered  - Instruct and encourage patient and family to use good hand hygiene technique  - Identify and instruct in appropriate isolation precautions for identified infection/condition  Outcome: Progressing

## 2022-09-01 NOTE — PLAN OF CARE
Patient is for a 3 5 hour HD session on a 4K2 25Ca bath for a serum potassium of 3 8 mmol/L drawn 8/31/22 with a net UF goal of 3L  Patient is not agreeable to do 3 5 hour HD session, Dr Ema Mcgovern is aware and patient is agreeable to 2 5-3 hour tx  Post-Dialysis RN Treatment Note    Blood Pressure:  Pre 191/99 mm/Hg  Post 191/86 mmHg   EDW  122 kg    Weight:  Pre 122 1 kg   Post 119 9 kg   Mode of weight measurement: Standing Scale   Volume Removed  2719 ml/ gross goal    Treatment duration 135 minutes    NS given  No    Treatment shortened? Yes, describe: Pt  only agreeable to 2 hour 15 minute tx despite Dr Esthela Leyva encouragement and explaination of the benefits for completing prescribed tx time  Patient is aware of potential side effects of shortening tx and still feels too "unwell" to complete tx     Medications given during - None- Venofer was not delivered in time for administration, primary RN aware via Chris   Estimated Kt/V  0 60   Access type: AV fistula   Access Issues: No    Report called to primary nurse   Yes, Jazz Guevara     Problem: METABOLIC, FLUID AND ELECTROLYTES - ADULT  Goal: Electrolytes maintained within normal limits  Description: INTERVENTIONS:  - Monitor labs and assess patient for signs and symptoms of electrolyte imbalances  - Administer electrolyte replacement as ordered  - Monitor response to electrolyte replacements, including repeat lab results as appropriate  - Instruct patient on fluid and nutrition as appropriate  Outcome: Progressing  Goal: Fluid balance maintained  Description: INTERVENTIONS:  - Monitor labs   - Monitor I/O and WT  - Instruct patient on fluid and nutrition as appropriate  - Assess for signs & symptoms of volume excess or deficit  Outcome: Progressing

## 2022-09-01 NOTE — ASSESSMENT & PLAN NOTE
· Previous provider reviewed note from neuro surgery 08/04/2022  · Low back pain probably/likely facet joint related  Multiple admissions and ED visits for right-sided back pain that radiates to her legs, more on the right side  Prior MRI demonstrated moderately advanced degenerative lumbar spine disease at multi levels  L4-5 disc herniation more on the right with moderate central canal stenosis  MRI with questionable L4-L5 osteomyelitis/diskitis per neuro surgery and ID highly unlikely  Underwent bilateral L3, L4-L5 dorsal ramus block at [facility] [place] in May 2022 with no improvement in symptoms  · Due to recent fall prior to her previous admission at the beginning of August CT of the right leg was ordered in the ED  No acute displaced fracture, mild osteoarthrosis  Possible cystitis   · Due to possible cystitis on CT scan, UA ordered and not yet obtained  Patient makes minimal urine  Denies dysuria    · Conservative measures with aqua k pad, bengay, Lyrica  · Continue decreased PRN oxycodone to 2 5 mg and PRN Robaxin, improvement in mentation noted today  · PT/OT recommending STR which patient is in agreement with

## 2022-09-01 NOTE — ASSESSMENT & PLAN NOTE
· History of DVT and PE 6 years ago  · On Coumadin, subtherapeutic on admission, questionable compliance, does not seem to know which medications she is taking at home   · Maintained on Coumadin 9 mg daily on PTA list   · S/p Coumadin 10 mg/9 mg here  · INR improved to 1 54 today, continue at home dose 9 mg daily   · Goal INR 2-3   · No indication to bridge for now unless INR does not improve

## 2022-09-02 ENCOUNTER — HOME CARE VISIT (OUTPATIENT)
Dept: HOME HEALTH SERVICES | Facility: HOME HEALTHCARE | Age: 57
End: 2022-09-02
Payer: MEDICARE

## 2022-09-02 LAB
GLUCOSE SERPL-MCNC: 123 MG/DL (ref 65–140)
GLUCOSE SERPL-MCNC: 148 MG/DL (ref 65–140)
GLUCOSE SERPL-MCNC: 153 MG/DL (ref 65–140)
GLUCOSE SERPL-MCNC: 176 MG/DL (ref 65–140)
INR PPP: 1.73 (ref 0.84–1.19)
PROTHROMBIN TIME: 20.5 SECONDS (ref 11.6–14.5)

## 2022-09-02 PROCEDURE — 85610 PROTHROMBIN TIME: CPT | Performed by: PHYSICIAN ASSISTANT

## 2022-09-02 PROCEDURE — 99232 SBSQ HOSP IP/OBS MODERATE 35: CPT | Performed by: INTERNAL MEDICINE

## 2022-09-02 PROCEDURE — 99232 SBSQ HOSP IP/OBS MODERATE 35: CPT | Performed by: PHYSICIAN ASSISTANT

## 2022-09-02 PROCEDURE — 82948 REAGENT STRIP/BLOOD GLUCOSE: CPT

## 2022-09-02 RX ORDER — OXYCODONE HYDROCHLORIDE 5 MG/1
2.5 TABLET ORAL EVERY 6 HOURS PRN
Status: DISCONTINUED | OUTPATIENT
Start: 2022-09-02 | End: 2022-09-07 | Stop reason: HOSPADM

## 2022-09-02 RX ORDER — METOCLOPRAMIDE HYDROCHLORIDE 5 MG/ML
10 INJECTION INTRAMUSCULAR; INTRAVENOUS ONCE
Status: COMPLETED | OUTPATIENT
Start: 2022-09-02 | End: 2022-09-02

## 2022-09-02 RX ADMIN — OXYCODONE HYDROCHLORIDE 2.5 MG: 5 TABLET ORAL at 10:30

## 2022-09-02 RX ADMIN — GABAPENTIN 100 MG: 100 CAPSULE ORAL at 10:14

## 2022-09-02 RX ADMIN — CARVEDILOL 25 MG: 25 TABLET, FILM COATED ORAL at 10:14

## 2022-09-02 RX ADMIN — DOCUSATE SODIUM 100 MG: 100 CAPSULE, LIQUID FILLED ORAL at 10:14

## 2022-09-02 RX ADMIN — HEPARIN SODIUM 5000 UNITS: 5000 INJECTION INTRAVENOUS; SUBCUTANEOUS at 14:02

## 2022-09-02 RX ADMIN — GABAPENTIN 100 MG: 100 CAPSULE ORAL at 22:35

## 2022-09-02 RX ADMIN — SEVELAMER HYDROCHLORIDE 1600 MG: 800 TABLET ORAL at 16:05

## 2022-09-02 RX ADMIN — SERTRALINE HYDROCHLORIDE 75 MG: 50 TABLET ORAL at 10:14

## 2022-09-02 RX ADMIN — METHOCARBAMOL 500 MG: 500 TABLET ORAL at 22:34

## 2022-09-02 RX ADMIN — SEVELAMER HYDROCHLORIDE 1600 MG: 800 TABLET ORAL at 12:10

## 2022-09-02 RX ADMIN — POLYETHYLENE GLYCOL 3350 17 G: 17 POWDER, FOR SOLUTION ORAL at 10:14

## 2022-09-02 RX ADMIN — METOCLOPRAMIDE HYDROCHLORIDE 10 MG: 5 INJECTION INTRAMUSCULAR; INTRAVENOUS at 15:57

## 2022-09-02 RX ADMIN — ALPRAZOLAM 0.25 MG: 0.25 TABLET ORAL at 22:34

## 2022-09-02 RX ADMIN — LORATADINE 5 MG: 10 TABLET ORAL at 10:14

## 2022-09-02 RX ADMIN — METHOCARBAMOL 500 MG: 500 TABLET ORAL at 04:58

## 2022-09-02 RX ADMIN — SEVELAMER HYDROCHLORIDE 1600 MG: 800 TABLET ORAL at 04:57

## 2022-09-02 RX ADMIN — LIDOCAINE 5% 1 PATCH: 700 PATCH TOPICAL at 10:14

## 2022-09-02 RX ADMIN — ONDANSETRON 4 MG: 2 INJECTION INTRAMUSCULAR; INTRAVENOUS at 12:05

## 2022-09-02 RX ADMIN — WARFARIN SODIUM 9 MG: 7.5 TABLET ORAL at 22:34

## 2022-09-02 RX ADMIN — GABAPENTIN 100 MG: 100 CAPSULE ORAL at 15:57

## 2022-09-02 RX ADMIN — MELATONIN 3 MG: at 22:39

## 2022-09-02 RX ADMIN — METHOCARBAMOL 500 MG: 500 TABLET ORAL at 10:30

## 2022-09-02 RX ADMIN — LEVOTHYROXINE SODIUM 50 MCG: 50 TABLET ORAL at 04:59

## 2022-09-02 RX ADMIN — DOCUSATE SODIUM 100 MG: 100 CAPSULE, LIQUID FILLED ORAL at 22:34

## 2022-09-02 RX ADMIN — HEPARIN SODIUM 5000 UNITS: 5000 INJECTION INTRAVENOUS; SUBCUTANEOUS at 22:34

## 2022-09-02 RX ADMIN — ACETAMINOPHEN 975 MG: 325 TABLET ORAL at 14:01

## 2022-09-02 RX ADMIN — ACETAMINOPHEN 975 MG: 325 TABLET ORAL at 04:58

## 2022-09-02 RX ADMIN — PANTOPRAZOLE SODIUM 40 MG: 40 TABLET, DELAYED RELEASE ORAL at 22:35

## 2022-09-02 RX ADMIN — ATORVASTATIN CALCIUM 20 MG: 20 TABLET, FILM COATED ORAL at 15:57

## 2022-09-02 RX ADMIN — ACETAMINOPHEN 975 MG: 325 TABLET ORAL at 22:34

## 2022-09-02 RX ADMIN — PREGABALIN 50 MG: 50 CAPSULE ORAL at 10:14

## 2022-09-02 RX ADMIN — CARVEDILOL 25 MG: 25 TABLET, FILM COATED ORAL at 22:34

## 2022-09-02 RX ADMIN — HEPARIN SODIUM 5000 UNITS: 5000 INJECTION INTRAVENOUS; SUBCUTANEOUS at 04:59

## 2022-09-02 RX ADMIN — Medication 1 CAPSULE: at 15:57

## 2022-09-02 RX ADMIN — PANTOPRAZOLE SODIUM 40 MG: 40 TABLET, DELAYED RELEASE ORAL at 10:14

## 2022-09-02 NOTE — PROGRESS NOTES
[address] [facility]  Progress Note Marce Strong 1965, 56 y o  female MRN: [id number]  Unit/Bed#: CW2 210-01 Encounter: [id number]  Primary Care Provider: Michelle Michael MD   Date and time admitted to hospital: 8/30/2022 10:34 AM      DOS: 9/2/2022  * Lethargy  Assessment & Plan  · Patient was in her usual state of health, after finishing dialysis on day of admission when she had an episode of decreased level of consciousness  Patient states she was able to hear nurses calling her name but she could not answer  Per EMS no seizure-like activities and shortly she was back at her baseline mentation  She had a sharp right-sided back pain which she has been dealing with since last year  Per ED, it was not reported that she was hypotensive during this episode  · Unclear cause  ?  Transient hypotension which she had in the past during dialysis  Less likely seizure, no seizure-like activities reported per facility, and shortly she was back at her baseline mentation  Possibly pain related vs  Related to hypoglycemia   · Nonfocal neurologic exam  · CT head negative for acute pathology  · Telemetry discontinued as pt taking leads off, no events noted   · Pt with persistent hypoglycemia here, endocrinology consulted, lantus discontinued, SSI coverage PRN with improvement and improvement in mentation as well   · Possible metabolic/toxic encephalopathy in setting of hypoglycemia, sedating medications treated with D50, adjusting medications and monitoring mental status   · Pt's significant other concerned regarding some forgetfulness/memory loss issues and tremors of the bilateral hands, reports parkinsons runs in the patient's family   Recommended outpatient neurology/cognitive testing     Type 2 diabetes mellitus [facility])  Assessment & Plan  Lab Results   Component Value Date    HGBA1C 9 4 (H) 07/09/2022       Recent Labs     09/01/22  1628 09/01/22  2100 09/02/22  0556 09/02/22  1142 POCGLU 191* 168* 153* 148*       Blood Sugar Average: Last 72 hrs:  (P) 224 [id number]   · On Lantus 12 units HS and NovoLog sliding scale at home   · Noted to have persistent hypoglycemia while hospitalized   · Lantus discontinued, continue PRN SSi coverage for now  · Endocrinology following, appreciate recommendations  · Encourage PO intake   · QID glucose checks  · Received 2 amps D50 8/31  · Diabetic diet  · Could be contributing to patient's overall presentation     Lumbar radiculopathy  Assessment & Plan  · Previous provider reviewed note from neuro surgery 08/04/2022  · Low back pain probably/likely facet joint related  Multiple admissions and ED visits for right-sided back pain that radiates to her legs, more on the right side  Prior MRI demonstrated moderately advanced degenerative lumbar spine disease at multi levels  L4-5 disc herniation more on the right with moderate central canal stenosis  MRI with questionable L4-L5 osteomyelitis/diskitis per neuro surgery and ID highly unlikely  Underwent bilateral L3, L4-L5 dorsal ramus block at [facility] in May 2022 with no improvement in symptoms  · Due to recent fall prior to her previous admission at the beginning of August CT of the right leg was ordered in the ED  No acute displaced fracture, mild osteoarthrosis  Possible cystitis   · Due to possible cystitis on CT scan, UA ordered and not yet obtained  Patient makes minimal urine      · Conservative measures with aqua k pad, bengfacundo Lyrica  · Continue decreased PRN oxycodone to 2 5 mg and PRN Robaxin, improvement in mentation noted  · PT/OT recommending STR which patient is in agreement with currently   · Pt reporting more pain in the right leg today, increase oxycodone to 2 5 mg Q6H, has not been utilizing her PRN oxycodone very often  · Monitor symptoms, if continued pain tomorrow consideration for acute pain consult vs  Neurosurgery re-consultation     Elevated troponin  Assessment & Plan  · Suspect non MI troponin elevation secondary to hypertensive urgency on admission   · Also with underlying ESRD on HD  · Denies any chest pain palpitation or shortness of breath, EKG without acute ischemic changes    Constipation  Assessment & Plan  · Continue daily miralax and colace  · Monitor stool output, last BM documented on 8/31    Pulmonary embolus (HCC)  Assessment & Plan  · History of DVT and PE 6 years ago  · On Coumadin, subtherapeutic on admission, questionable compliance, does not seem to know which medications she is taking at home   · Maintained on Coumadin 9 mg daily on PTA list   · S/p Coumadin 10 mg/9/9 mg here  · INR improved to 1 7 today, continue at home dose 9 mg daily   · Goal INR 2-3   · No indication to bridge for now unless INR does not improve     Acquired hypothyroidism  Assessment & Plan  · Continue levothyroxine 50 mcg daily    Anemia of chronic disease  Assessment & Plan  · Secondary to end-stage renal disease  · Hemoglobin at baseline at 8 3    Esophageal reflux  Assessment & Plan  · Continue PPI    Anxiety disorder  Assessment & Plan  · Continue Xanax 0 25 mg b i d  p r n , hold for sedation   · Continue Zoloft 75 mg daily    ESRD on hemodialysis  Assessment & Plan  Lab Results   Component Value Date    EGFR 5 09/01/2022    EGFR 6 08/31/2022    EGFR 10 08/30/2022    CREATININE 8 05 (H) 09/01/2022    CREATININE 6 46 (H) 08/31/2022    CREATININE 4 35 (H) 08/30/2022   · On dialysis TTS  · Nephrology consulted for HD management, plan for HD tomorrow    Essential hypertension  Assessment & Plan  · Hypertensive urgency noted on admission, likely in setting of missing dose of coreg on day of admit   · Overall much improved, BP stabilized   · Continue Coreg 25 mg BID  · Torsemide and nifedipine were discontinued on previous admission 8/15 due to hypotension  · Monitor     VTE Pharmacologic Prophylaxis: VTE Score: 3 Moderate Risk (Score 3-4) - Pharmacological DVT Prophylaxis Ordered: heparin  Patient Centered Rounds: I evaluated the patient without nursing staff present due to speaking to nurse outside patient's room   Discussions with Specialists or Other Care Team Provider: Discussed with RN, CM and reviewed previous notes     Education and Discussions with Family / Patient: Patient declined call to   Time Spent for Care: 20 minutes  More than 50% of total time spent on counseling and coordination of care as described above  Current Length of Stay: 2 day(s)  Current Patient Status: Inpatient   Certification Statement: The patient will continue to require additional inpatient hospital stay due to pain management, STR placement  Discharge Plan: Anticipate discharge in 48 hrs to rehab facility  Code Status: Level 1 - Full Code    Subjective:   Pt reports she is having severe pain in her right leg into her groin today  Denies any issues with urination, does not urinate much secondary to ESRD  Reports associated numbness/tingling that goes down the leg, has been dealing with this for months  States she is having difficulties sitting up today due to the pain  Also reporting nausea, no vomiting  Requesting Reglan, one time dose given  Objective:     Vitals:   Temp (24hrs), Av 3 °F (36 8 °C), Min:98 1 °F (36 7 °C), Max:98 4 °F (36 9 °C)    Temp:  [98 1 °F (36 7 °C)-98 4 °F (36 9 °C)] 98 1 °F (36 7 °C)  HR:  [70-73] 70  Resp:  [16-18] 16  BP: ()/(53-98) 144/64  SpO2:  [90 %-95 %] 95 %  There is no height or weight on file to calculate BMI  Input and Output Summary (last 24 hours): Intake/Output Summary (Last 24 hours) at 2022 1611  Last data filed at 2022 1100  Gross per 24 hour   Intake 370 ml   Output --   Net 370 ml       Physical Exam:   Physical Exam  Vitals reviewed  Constitutional:       General: She is not in acute distress  Appearance: She is not toxic-appearing        Comments: Pt is in no acute distress lying in her hospital bed, tearful and anxious  HENT:      Head: Normocephalic and atraumatic  Cardiovascular:      Rate and Rhythm: Normal rate and regular rhythm  Pulses: Normal pulses  Pulmonary:      Effort: Pulmonary effort is normal  No respiratory distress  Abdominal:      General: Bowel sounds are normal  There is no distension  Palpations: Abdomen is soft  Musculoskeletal:      Comments: Prior right TMA   Skin:     General: Skin is warm and dry  Neurological:      Mental Status: She is alert  Additional Data:     Labs:  Results from last 7 days   Lab Units 08/30/22  1123   WBC Thousand/uL 6 16   HEMOGLOBIN g/dL 8 3*   HEMATOCRIT % 26 6*   PLATELETS Thousands/uL 187   NEUTROS PCT % 70   LYMPHS PCT % 17   MONOS PCT % 9   EOS PCT % 2     Results from last 7 days   Lab Units 09/01/22  0822 08/31/22  1022 08/30/22  1123   SODIUM mmol/L 137   < > 136   POTASSIUM mmol/L 4 4   < > 3 6   CHLORIDE mmol/L 101   < > 99   CO2 mmol/L 32   < > 30   BUN mg/dL 28*   < > 14   CREATININE mg/dL 8 05*   < > 4 35*   ANION GAP mmol/L 4   < > 7   CALCIUM mg/dL 8 8   < > 9 6   ALBUMIN g/dL  --   --  3 1*   TOTAL BILIRUBIN mg/dL  --   --  0 80   ALK PHOS U/L  --   --  102   ALT U/L  --   --  17   AST U/L  --   --  18   GLUCOSE RANDOM mg/dL 123   < > 103    < > = values in this interval not displayed       Results from last 7 days   Lab Units 09/02/22  1201   INR  1 73*     Results from last 7 days   Lab Units 09/02/22  1142 09/02/22  0556 09/01/22  2100 09/01/22  1628 09/01/22  1154 09/01/22  0528 08/31/22  2116 08/31/22  1714 08/31/22  1213 08/31/22  1141 08/31/22  0731 08/31/22  0657   POC GLUCOSE mg/dl 148* 153* 168* 191* 120 140 178* 130 162* 38* 48* 58*               Lines/Drains:  Invasive Devices  Report    Peripheral Intravenous Line  Duration           Peripheral IV 09/02/22 Right;Ventral (anterior) Forearm <1 day          Line  Duration           Hemodialysis AV Fistula 02/20/18 Left Forearm 1654 days                      Imaging: No pertinent imaging reviewed      Recent Cultures (last 7 days):         Last 24 Hours Medication List:   Current Facility-Administered Medications   Medication Dose Route Frequency Provider Last Rate    acetaminophen  650 mg Oral Q6H PRN Dyan Cedeno MD      acetaminophen  975 mg Oral Q8H [address] Dyan Cedeno MD      albuterol  2 puff Inhalation Q6H PRN Dyan Cedeno MD      ALPRAZolam  0 25 mg Oral BID PRN Dyan Cedeno MD      atorvastatin  20 mg Oral Daily With Jade Rueda MD      b complex-vitamin C-folic acid  1 capsule Oral Daily With Jade Rueda MD      calcitriol  0 25 mcg Oral Once per day on Tue Thu Sat LOIS Max      carvedilol  25 mg Oral BID Dyan Cedeno MD      docusate sodium  100 mg Oral BID Villa Soler PA-C      gabapentin  100 mg Oral TID Dyan Cedeno MD      heparin (porcine)  5,000 Units Subcutaneous Q8H [address] Dyan Cedeno MD      hydrALAZINE  10 mg Intravenous Q6H PRN Dyan Cedeno MD      hydrOXYzine HCL  25 mg Oral Q6H PRN Dyan Cedeno MD      iron sucrose  50 mg Intravenous After Dialysis Brandyn Arciniega MD      levothyroxine  50 mcg Oral Early Morning Dyan Cedeno MD      lidocaine  1 patch Topical Daily Dyan Cedeno MD      loratadine  5 mg Oral Daily Dyan Cedeno MD      melatonin  3 mg Oral HS Dyan Cedeno MD      menthol-methyl salicylate   Apply externally 4x Daily PRN Dyan Cedeno MD      methocarbamol  500 mg Oral Q6H PRN Villa Soler PA-C      ondansetron  4 mg Intravenous Q6H PRN Dyan Cedeno MD      oxyCODONE  2 5 mg Oral Q6H PRN Maile Barr PA-C      pantoprazole  40 mg Oral BID Dyan Cedeno MD      polyethylene glycol  17 g Oral Daily Maile Barr PA-C      pregabalin  50 mg Oral Daily Dyan Cedeno MD      senna  2 tablet Oral HS PRN Dyan Cedeno MD      sertraline  75 mg Oral Daily Dominguez Knowles MD      sevelamer  1,600 mg Oral TID With Meals Dominguez Knowles MD      warfarin  9 mg Oral Daily (warfarin) Viviane Palma PA-C          Today, Patient Was Seen By: Lawerance Opitz, PA-C    **Please Note: This note may have been constructed using a voice recognition system  **

## 2022-09-02 NOTE — CASE MANAGEMENT
Case Management Discharge Planning Note    Patient name Noa Jackson  Location 2 210/CW2 165-65 MRN [id number]  : 1965 Date 2022       Current Admission Date: 2022  Current Admission Diagnosis:Lethargy   Patient Active Problem List    Diagnosis Date Noted    Lethargy 2022    Elevated troponin 2022    Intractable back pain 2022    Problem with vascular access 2022    Concern for Osteomyelitis/Discitis of lumbar region 2022    Fall 2022    Hypoxia 2022    COVID-19 2022    Right knee pain 2022    Constipation 2022    Pruritus 2021    Postop check 2021    Sigmoid diverticulitis 2021    Pneumonia 2021    Chronic anticoagulation 2021    Arteriovenous fistula for hemodialysis in place, primary ([facility] ) 2021    Healthcare-associated pneumonia 2021    Right foot pain 2021    A-V fistula ([facility] ) 2021    Chronic pain syndrome 2021    Bilateral primary osteoarthritis of knee 2021    Bilateral patellofemoral syndrome 2021    Tinea unguium 2020    S/P foot surgery, right 2020    History of claustrophobia 2020    Morbid obesity 2020    Hyperlipidemia 2020    Diabetic polyneuropathy associated with type 2 diabetes mellitus ([facility] ) 2020    Encounter for diabetic foot exam ([facility] ) 2020    Corns and callosities 2020    History of transmetatarsal amputation of right foot ([facility] ) 2020    Flu-like symptoms 2020    Lumbar radiculopathy 2020    Low back pain with sciatica 2020    Hemodialysis-associated hypotension 2019    Hyponatremia 2019    Parenchymal renal hypertension 2019    Candidiasis of breast 2019    Hyperkalemia 2019    Anemia of chronic disease 2019    Disruption of internal surgical wound 2019    Dyspnea 2019    Secondary hyperparathyroidism of renal origin ([facility] ) 04/27/2019    Nausea and vomiting 04/26/2019    Meningioma ([place] [address] ) 04/18/2019    Concussion without loss of consciousness 03/15/2019    Colon cancer screening 03/05/2019    Gastroesophageal reflux disease 03/05/2019    Primary osteoarthritis of right knee 10/25/2018    Hemodialysis status ([place] [address] ) 10/23/2018    Knee pain 10/22/2018    Anxiety disorder 04/06/2017    Acquired hypothyroidism 07/22/2016    Glaucoma 07/01/2016    ESRD on hemodialysis 07/01/2016    Abnormal uterine bleeding 02/15/2016    History of venous thromboembolism 02/14/2016    Pulmonary embolus ([place] [address] ) 10/30/2015    Cervical dysplasia 05/03/2015    Chronic endometritis 10/17/2014    Tinea pedis 10/02/2014    Benign neoplasm of skin 09/25/2014    Type 2 diabetes mellitus ([place] [address] ) 09/04/2014    Phlebitis and thrombophlebitis of superficial vessels of lower extremities 04/10/2014    Limb pain 11/25/2013    Mononeuritis of upper limb, unspecified laterality 11/25/2013    Legal blindness, as defined in [place] [facility] 58/13/3450    Complication from renal dialysis device 04/22/2013    Essential hypertension 10/15/2012    Cardiomyopathy ([place] [address] ) 09/26/2012    Esophageal reflux 08/20/2012    Allergic rhinitis 08/20/2012      LOS (days): 2  Geometric Mean LOS (GMLOS) (days): 3 80  Days to GMLOS:1 8     OBJECTIVE:  Risk of Unplanned Readmission Score: 59 18         Current admission status: Inpatient   Preferred Pharmacy:   [facility]/pharmacy #[id number] RALPH Toscano - [address] [facility]  [address] [place] [address]  Phone: [phone] Fax: [address] [facility], [address] [place] [address]  Phone: [phone] Fax: [phone]    Primary Care Provider: Clinton Hernandez MD    Primary Insurance: MEDICARE  Secondary Insurance: [facility]    DISCHARGE DETAILS:             Pt is accepted to [facility] and [facility] upon medical stability  Pt prefers [facility] and they will review on Monday for bed openings/appropriateness for admission (admissions team presents with concerns re: behaviors)  Pt may be medically stable in 24-48hrs  Mina Cummins continues to follow for at-home PT/OT/SN if pt prefers to return home  CM will follow

## 2022-09-02 NOTE — ASSESSMENT & PLAN NOTE
· History of DVT and PE 6 years ago  · On Coumadin, subtherapeutic on admission, questionable compliance, does not seem to know which medications she is taking at home   · Maintained on Coumadin 9 mg daily on PTA list   · S/p Coumadin 10 mg/9/9 mg here  · INR improved to 1 7 today, continue at home dose 9 mg daily   · Goal INR 2-3   · No indication to bridge for now unless INR does not improve

## 2022-09-02 NOTE — ASSESSMENT & PLAN NOTE
· Patient was in her usual state of health, after finishing dialysis on day of admission when she had an episode of decreased level of consciousness  Patient states she was able to hear nurses calling her name but she could not answer  Per EMS no seizure-like activities and shortly she was back at her baseline mentation  She had a sharp right-sided back pain which she has been dealing with since last year  Per ED, it was not reported that she was hypotensive during this episode  · Unclear cause  ?  Transient hypotension which she had in the past during dialysis  Less likely seizure, no seizure-like activities reported per facility, and shortly she was back at her baseline mentation  Possibly pain related vs  Related to hypoglycemia   · Nonfocal neurologic exam  · CT head negative for acute pathology  · Telemetry discontinued as pt taking leads off, no events noted   · Pt with persistent hypoglycemia here, endocrinology consulted, lantus discontinued, SSI coverage PRN with improvement and improvement in mentation as well   · Possible metabolic/toxic encephalopathy in setting of hypoglycemia, sedating medications treated with D50, adjusting medications and monitoring mental status   · Pt's significant other concerned regarding some forgetfulness/memory loss issues and tremors of the bilateral hands, reports parkinsons runs in the patient's family   Recommended outpatient neurology/cognitive testing

## 2022-09-02 NOTE — ASSESSMENT & PLAN NOTE
Lab Results   Component Value Date    HGBA1C 9 4 (H) 07/09/2022       Recent Labs     09/01/22  1628 09/01/22  2100 09/02/22  0556 09/02/22  1142   POCGLU 191* 168* 153* 148*       Blood Sugar Average: Last 72 hrs:  (P) 914 [id number]   · On Lantus 12 units HS and NovoLog sliding scale at home   · Noted to have persistent hypoglycemia while hospitalized   · Lantus discontinued, continue PRN SSi coverage for now  · Endocrinology following, appreciate recommendations  · Encourage PO intake   · QID glucose checks  · Received 2 amps D50 8/31  · Diabetic diet  · Could be contributing to patient's overall presentation

## 2022-09-02 NOTE — PLAN OF CARE
Problem: INFECTION - ADULT  Goal: Absence or prevention of progression during hospitalization  Description: INTERVENTIONS:  - Assess and monitor for signs and symptoms of infection  - Monitor lab/diagnostic results  - Monitor all insertion sites, i e  indwelling lines, tubes, and drains  - Monitor endotracheal if appropriate and nasal secretions for changes in amount and color  - [place] appropriate cooling/warming therapies per order  - Administer medications as ordered  - Instruct and encourage patient and family to use good hand hygiene technique  - Identify and instruct in appropriate isolation precautions for identified infection/condition  Outcome: Progressing     Problem: METABOLIC, FLUID AND ELECTROLYTES - ADULT  Goal: Electrolytes maintained within normal limits  Description: INTERVENTIONS:  - Monitor labs and assess patient for signs and symptoms of electrolyte imbalances  - Administer electrolyte replacement as ordered  - Monitor response to electrolyte replacements, including repeat lab results as appropriate  - Instruct patient on fluid and nutrition as appropriate  Outcome: Progressing     Problem: PAIN - ADULT  Goal: Verbalizes/displays adequate comfort level or baseline comfort level  Description: Interventions:  - Encourage patient to monitor pain and request assistance  - Assess pain using appropriate pain scale  - Administer analgesics based on type and severity of pain and evaluate response  - Implement non-pharmacological measures as appropriate and evaluate response  - Consider cultural and social influences on pain and pain management  - Notify physician/advanced practitioner if interventions unsuccessful or patient reports new pain  Outcome: Progressing

## 2022-09-02 NOTE — ASSESSMENT & PLAN NOTE
· Previous provider reviewed note from neuro surgery 08/04/2022  · Low back pain probably/likely facet joint related  Multiple admissions and ED visits for right-sided back pain that radiates to her legs, more on the right side  Prior MRI demonstrated moderately advanced degenerative lumbar spine disease at multi levels  L4-5 disc herniation more on the right with moderate central canal stenosis  MRI with questionable L4-L5 osteomyelitis/diskitis per neuro surgery and ID highly unlikely  Underwent bilateral L3, L4-L5 dorsal ramus block at [facility] in May 2022 with no improvement in symptoms  · Due to recent fall prior to her previous admission at the beginning of August CT of the right leg was ordered in the ED  No acute displaced fracture, mild osteoarthrosis  Possible cystitis   · Due to possible cystitis on CT scan, UA ordered and not yet obtained  Patient makes minimal urine      · Conservative measures with aqua k pad, bengay, Lyrica  · Continue decreased PRN oxycodone to 2 5 mg and PRN Robaxin, improvement in mentation noted  · PT/OT recommending STR which patient is in agreement with currently   · Pt reporting more pain in the right leg today, increase oxycodone to 2 5 mg Q6H, has not been utilizing her PRN oxycodone very often  · Monitor symptoms, if continued pain tomorrow consideration for acute pain consult vs  Neurosurgery re-consultation

## 2022-09-02 NOTE — PROGRESS NOTES
Patient removed IV and threw it across to other bed in patient's room  RN unable to obtain IV access  HARISH made aware  Stat RN made aware and will use ultrasound to place IV

## 2022-09-02 NOTE — CASE COMMUNICATION
Per Epic chart review pt is in the hospital  Visit frequency out of compliance for OT orders with pt seen only 1x this week  This information is fyi only

## 2022-09-02 NOTE — PROGRESS NOTES
NEPHROLOGY PROGRESS NOTE   Vinod Walton 56 y o  female MRN: [id number]  Unit/Bed#: CW2 210-01 Encounter: [id number]      ASSESSMENT & PLAN:  1  ESRD on HD TTS at [facility] [address]  Next hemodialysis tomorrow following outpatient schedule  Dialysis treatment yesterday was terminated after 2 hours as per patient request   Continue UF on dialysis as tolerated  Cautious with inter dialytic hypotension    2  Altered mental status, reported decreased level of consciousness after dialysis as an outpatient on 8/30, further workup per primary team     3  Hypoglycemia, type 2 diabetes, endocrinology on board    4  Hemodynamics, blood pressure elevated, suspected component due to pain, continue current regimen, continue with UF on dialysis    5  Anemia secondary to chronic kidney disease, monitor H&H and transfusion for hemoglobin less than 7  No MITCHELL due to history of PE    6  Mineral bone disease, continue with renal diet and binders with meals    7  Access, left upper extremity AV fistula in place    My plan and recommendations were discussed with Internal Medicine team    SUBJECTIVE:  Patient seen and examined, continues complaining of right-sided flank/back pain, today states that it radiates to her leg, denies any chest pain, no shortness of breath, no nausea, no vomiting    Last hemodialysis yesterday, treatment terminated after 2 hours after patient request to finished earlier    OBJECTIVE:  Current Weight:    Vitals:    09/01/22 2328   BP: (!) 180/68   Pulse: 73   Resp: 18   Temp: 98 4 °F (36 9 °C)   SpO2: 90%       Intake/Output Summary (Last 24 hours) at 9/2/2022 1808  Last data filed at 9/1/2022 2100  Gross per 24 hour   Intake 1000 ml   Output 2719 ml   Net -1719 ml     General: conscious, cooperative, in not acute distress  Eyes: conjunctivae pink, anicteric sclerae  ENT: lips and mucous membranes moist  Neck: supple, no JVD  Chest: clear breath sounds bilateral, no crackles, ronchus or wheezings  CVS: distinct S1 & S2, normal rate, regular rhythm  Abdomen:  Obese, non-tender, non-distended, normoactive bowel sounds  Extremities: no significant edema of both legs, left upper extremity AV fistula in place  Skin: no rash  Neuro: awake, alert, oriented          Medications:    Current Facility-Administered Medications:     acetaminophen (TYLENOL) tablet 650 mg, 650 mg, Oral, Q6H PRN, Allen Guthrie MD, 650 mg at 08/30/22 2303    acetaminophen (TYLENOL) tablet 975 mg, 975 mg, Oral, Q8H [facility], Lottie Oliveira MD, 975 mg at 09/02/22 0458    albuterol (PROVENTIL HFA,VENTOLIN HFA) inhaler 2 puff, 2 puff, Inhalation, Q6H PRN, Allen Guthrie MD    ALPRAZolam Gino Servin) tablet 0 25 mg, 0 25 mg, Oral, BID PRN, Allen Guthrie MD, 0 25 mg at 09/01/22 2159    atorvastatin (LIPITOR) tablet 20 mg, 20 mg, Oral, Daily With Dinner, Allen Guthrie MD, 20 mg at 09/01/22 1739    b complex-vitamin C-folic acid (NEPHROCAPS) capsule 1 capsule, 1 capsule, Oral, Daily With Dinner, Allen Guthrie MD, 1 capsule at 09/01/22 1739    calcitriol (ROCALTROL) capsule 0 25 mcg, 0 25 mcg, Oral, Once per day on Tue Thu Sat, LOIS Garza, 0 25 mcg at 09/01/22 1147    carvedilol (COREG) tablet 25 mg, 25 mg, Oral, BID, Lottie Oliveira MD, 25 mg at 09/01/22 1748    docusate sodium (COLACE) capsule 100 mg, 100 mg, Oral, BID, Denis Luis PA-C, 100 mg at 09/01/22 1741    gabapentin (NEURONTIN) capsule 100 mg, 100 mg, Oral, TID, Lottie Oliveira MD, 100 mg at 09/01/22 2159    heparin (porcine) subcutaneous injection 5,000 Units, 5,000 Units, Subcutaneous, Q8H [facility], Allen Guthrie MD, 5,000 Units at 09/02/22 0459    hydrALAZINE (APRESOLINE) injection 10 mg, 10 mg, Intravenous, Q6H PRN, Allen Guthrie MD    hydrOXYzine HCL (ATARAX) tablet 25 mg, 25 mg, Oral, Q6H PRN, Allen Guthrie MD, 25 mg at 08/30/22 6928    iron sucrose (VENOFER) 50 mg in sodium chloride 0 9 % 100 mL IVPB, 50 mg, Intravenous, After Dialysis, [facility] Jude Vyas MD    levothyroxine tablet 50 mcg, 50 mcg, Oral, Early Morning, Dyan Cedeno MD, 50 mcg at 09/02/22 0459    lidocaine (LIDODERM) 5 % patch 1 patch, 1 patch, Topical, Daily, Dyan Cedeno MD, 1 patch at 09/01/22 1137    loratadine (CLARITIN) tablet 5 mg, 5 mg, Oral, Daily, Dyan Cedeno MD, 5 mg at 09/01/22 1149    melatonin tablet 3 mg, 3 mg, Oral, HS, Dyan Cedeno MD, 3 mg at 09/01/22 2200    menthol-methyl salicylate (BENGAY) 07-23 % cream, , Apply externally, 4x Daily PRN, Dyan Cedeno MD, Given at 09/01/22 2205    methocarbamol (ROBAXIN) tablet 500 mg, 500 mg, Oral, Q6H PRN, Villa Soler PA-C, 500 mg at 09/02/22 0458    ondansetron (ZOFRAN) injection 4 mg, 4 mg, Intravenous, Q6H PRN, Dyan Cedeno MD    oxyCODONE (ROXICODONE) IR tablet 2 5 mg, 2 5 mg, Oral, Q8H PRN, Villa Soler PA-C, 2 5 mg at 09/01/22 1337    pantoprazole (PROTONIX) EC tablet 40 mg, 40 mg, Oral, BID, Dyan Cedeno MD, 40 mg at 09/01/22 1741    polyethylene glycol (MIRALAX) packet 17 g, 17 g, Oral, Daily, Villa Soler PA-C, 17 g at 09/01/22 1200    pregabalin (LYRICA) capsule 50 mg, 50 mg, Oral, Daily, Dyan Cedeno MD, 50 mg at 09/01/22 1149    senna (SENOKOT) tablet 17 2 mg, 2 tablet, Oral, HS PRN, Dyan Cedeno MD    sertraline (ZOLOFT) tablet 75 mg, 75 mg, Oral, Daily, Dyan Cedeno MD, 75 mg at 09/01/22 1148    sevelamer (RENAGEL) tablet 1,600 mg, 1,600 mg, Oral, TID With Meals, Dyan Cedeno MD, 1,600 mg at 09/02/22 0457    warfarin (COUMADIN) tablet 9 mg, 9 mg, Oral, Daily (warfarin), Villa Soler PA-C, 9 mg at 09/01/22 1741    Invasive Devices:        Lab Results:   Results from last 7 days   Lab Units 09/01/22  0822 08/31/22  1022 08/30/22  1123   WBC Thousand/uL  --   --  6 16   HEMOGLOBIN g/dL  --   --  8 3*   HEMATOCRIT %  --   --  26 6*   PLATELETS Thousands/uL  --   --  187   SODIUM mmol/L 137 135 136   POTASSIUM mmol/L 4 4 3 8 3 6   CHLORIDE mmol/L 101 100 99   CO2 mmol/L 32 31 30   BUN mg/dL 28* 19 14   CREATININE mg/dL 8 05* 6 46* 4 35*   CALCIUM mg/dL 8 8 9 1 9 6   MAGNESIUM mg/dL  --  2 3  --    ALK PHOS U/L  --   --  102   ALT U/L  --   --  17   AST U/L  --   --  18       Previous work up:  See previous notes      Portions of the record may have been created with voice recognition software  Occasional wrong word or "sound a like" substitutions may have occurred due to the inherent limitations of voice recognition software  Read the chart carefully and recognize, using context, where substitutions have occurred  If you have any questions, please contact the dictating provider

## 2022-09-02 NOTE — ASSESSMENT & PLAN NOTE
Lab Results   Component Value Date    EGFR 5 09/01/2022    EGFR 6 08/31/2022    EGFR 10 08/30/2022    CREATININE 8 05 (H) 09/01/2022    CREATININE 6 46 (H) 08/31/2022    CREATININE 4 35 (H) 08/30/2022   · On dialysis TTS  · Nephrology consulted for HD management, plan for HD tomorrow

## 2022-09-03 ENCOUNTER — APPOINTMENT (INPATIENT)
Dept: DIALYSIS | Facility: HOSPITAL | Age: 57
DRG: 070 | End: 2022-09-03
Payer: MEDICARE

## 2022-09-03 LAB
ERYTHROCYTE [DISTWIDTH] IN BLOOD BY AUTOMATED COUNT: 14.5 % (ref 11.6–15.1)
GLUCOSE SERPL-MCNC: 112 MG/DL (ref 65–140)
GLUCOSE SERPL-MCNC: 159 MG/DL (ref 65–140)
GLUCOSE SERPL-MCNC: 163 MG/DL (ref 65–140)
GLUCOSE SERPL-MCNC: 167 MG/DL (ref 65–140)
HCT VFR BLD AUTO: 25.4 % (ref 34.8–46.1)
HGB BLD-MCNC: 8 G/DL (ref 11.5–15.4)
INR PPP: 2.01 (ref 0.84–1.19)
MCH RBC QN AUTO: 30.9 PG (ref 26.8–34.3)
MCHC RBC AUTO-ENTMCNC: 31.5 G/DL (ref 31.4–37.4)
MCV RBC AUTO: 98 FL (ref 82–98)
PLATELET # BLD AUTO: 160 THOUSANDS/UL (ref 149–390)
PMV BLD AUTO: 10.8 FL (ref 8.9–12.7)
PROTHROMBIN TIME: 23 SECONDS (ref 11.6–14.5)
RBC # BLD AUTO: 2.59 MILLION/UL (ref 3.81–5.12)
WBC # BLD AUTO: 4.62 THOUSAND/UL (ref 4.31–10.16)

## 2022-09-03 PROCEDURE — 85610 PROTHROMBIN TIME: CPT | Performed by: PHYSICIAN ASSISTANT

## 2022-09-03 PROCEDURE — 82985 ASSAY OF GLYCATED PROTEIN: CPT | Performed by: STUDENT IN AN ORGANIZED HEALTH CARE EDUCATION/TRAINING PROGRAM

## 2022-09-03 PROCEDURE — 82948 REAGENT STRIP/BLOOD GLUCOSE: CPT

## 2022-09-03 PROCEDURE — 90935 HEMODIALYSIS ONE EVALUATION: CPT | Performed by: INTERNAL MEDICINE

## 2022-09-03 PROCEDURE — 85027 COMPLETE CBC AUTOMATED: CPT | Performed by: PHYSICIAN ASSISTANT

## 2022-09-03 PROCEDURE — 99232 SBSQ HOSP IP/OBS MODERATE 35: CPT | Performed by: NURSE PRACTITIONER

## 2022-09-03 RX ORDER — ONDANSETRON 4 MG/1
4 TABLET, ORALLY DISINTEGRATING ORAL EVERY 6 HOURS PRN
Status: DISCONTINUED | OUTPATIENT
Start: 2022-09-03 | End: 2022-09-07 | Stop reason: HOSPADM

## 2022-09-03 RX ADMIN — HEPARIN SODIUM 5000 UNITS: 5000 INJECTION INTRAVENOUS; SUBCUTANEOUS at 06:56

## 2022-09-03 RX ADMIN — ATORVASTATIN CALCIUM 20 MG: 20 TABLET, FILM COATED ORAL at 18:03

## 2022-09-03 RX ADMIN — METHOCARBAMOL 500 MG: 500 TABLET ORAL at 08:51

## 2022-09-03 RX ADMIN — MELATONIN 3 MG: at 21:03

## 2022-09-03 RX ADMIN — LORATADINE 5 MG: 10 TABLET ORAL at 08:47

## 2022-09-03 RX ADMIN — CARVEDILOL 25 MG: 25 TABLET, FILM COATED ORAL at 18:07

## 2022-09-03 RX ADMIN — HEPARIN SODIUM 5000 UNITS: 5000 INJECTION INTRAVENOUS; SUBCUTANEOUS at 14:01

## 2022-09-03 RX ADMIN — HYDROXYZINE HYDROCHLORIDE 25 MG: 25 TABLET ORAL at 14:00

## 2022-09-03 RX ADMIN — POLYETHYLENE GLYCOL 3350 17 G: 17 POWDER, FOR SOLUTION ORAL at 08:47

## 2022-09-03 RX ADMIN — OXYCODONE HYDROCHLORIDE 2.5 MG: 5 TABLET ORAL at 08:49

## 2022-09-03 RX ADMIN — SEVELAMER HYDROCHLORIDE 1600 MG: 800 TABLET ORAL at 06:54

## 2022-09-03 RX ADMIN — GABAPENTIN 100 MG: 100 CAPSULE ORAL at 08:49

## 2022-09-03 RX ADMIN — CALCITRIOL CAPSULES 0.25 MCG 0.25 MCG: 0.25 CAPSULE ORAL at 08:49

## 2022-09-03 RX ADMIN — SENNOSIDES 17.2 MG: 8.6 TABLET, FILM COATED ORAL at 21:16

## 2022-09-03 RX ADMIN — SERTRALINE HYDROCHLORIDE 75 MG: 50 TABLET ORAL at 08:49

## 2022-09-03 RX ADMIN — WARFARIN SODIUM 9 MG: 7.5 TABLET ORAL at 18:07

## 2022-09-03 RX ADMIN — LIDOCAINE 5% 1 PATCH: 700 PATCH TOPICAL at 08:51

## 2022-09-03 RX ADMIN — ACETAMINOPHEN 975 MG: 325 TABLET ORAL at 14:00

## 2022-09-03 RX ADMIN — ACETAMINOPHEN 975 MG: 325 TABLET ORAL at 21:03

## 2022-09-03 RX ADMIN — Medication 1 CAPSULE: at 18:03

## 2022-09-03 RX ADMIN — SEVELAMER HYDROCHLORIDE 1600 MG: 800 TABLET ORAL at 14:00

## 2022-09-03 RX ADMIN — METHOCARBAMOL 500 MG: 500 TABLET ORAL at 18:03

## 2022-09-03 RX ADMIN — ONDANSETRON 4 MG: 4 TABLET, ORALLY DISINTEGRATING ORAL at 14:00

## 2022-09-03 RX ADMIN — DOCUSATE SODIUM 100 MG: 100 CAPSULE, LIQUID FILLED ORAL at 08:51

## 2022-09-03 RX ADMIN — CARVEDILOL 25 MG: 25 TABLET, FILM COATED ORAL at 08:50

## 2022-09-03 RX ADMIN — PANTOPRAZOLE SODIUM 40 MG: 40 TABLET, DELAYED RELEASE ORAL at 08:50

## 2022-09-03 RX ADMIN — GABAPENTIN 100 MG: 100 CAPSULE ORAL at 21:03

## 2022-09-03 RX ADMIN — HEPARIN SODIUM 5000 UNITS: 5000 INJECTION INTRAVENOUS; SUBCUTANEOUS at 21:04

## 2022-09-03 RX ADMIN — GABAPENTIN 100 MG: 100 CAPSULE ORAL at 18:03

## 2022-09-03 RX ADMIN — LEVOTHYROXINE SODIUM 50 MCG: 50 TABLET ORAL at 06:55

## 2022-09-03 RX ADMIN — OXYCODONE HYDROCHLORIDE 2.5 MG: 5 TABLET ORAL at 18:03

## 2022-09-03 RX ADMIN — PREGABALIN 50 MG: 50 CAPSULE ORAL at 08:50

## 2022-09-03 RX ADMIN — PANTOPRAZOLE SODIUM 40 MG: 40 TABLET, DELAYED RELEASE ORAL at 18:07

## 2022-09-03 RX ADMIN — DOCUSATE SODIUM 100 MG: 100 CAPSULE, LIQUID FILLED ORAL at 18:07

## 2022-09-03 RX ADMIN — ACETAMINOPHEN 975 MG: 325 TABLET ORAL at 06:55

## 2022-09-03 RX ADMIN — SEVELAMER HYDROCHLORIDE 1600 MG: 800 TABLET ORAL at 18:11

## 2022-09-03 NOTE — PROGRESS NOTES
[facility]  Progress Note Pérez Cook 1965, 56 y o  female MRN: [id number]  Unit/Bed#: CW2 210-01 Encounter: [id number]  Primary Care Provider: Clinton Hernandez MD   Date and time admitted to hospital: 8/30/2022 10:34 AM    * Lethargy  Assessment & Plan  · Patient was in her usual state of health, after finishing dialysis on day of admission when she had an episode of decreased level of consciousness  Patient states she was able to hear nurses calling her name but she could not answer  Per EMS no seizure-like activities and shortly she was back at her baseline mentation  She had a sharp right-sided back pain which she has been dealing with since last year  Per ED, it was not reported that she was hypotensive during this episode  · Unclear cause  ?  Transient hypotension which she had in the past during dialysis  Less likely seizure, no seizure-like activities reported per facility, and shortly she was back at her baseline mentation  Possibly pain related vs  Related to hypoglycemia   · Nonfocal neurologic exam  · CT head negative for acute pathology  · Telemetry discontinued as pt taking leads off, no events noted   · Pt with persistent hypoglycemia here, endocrinology consulted, lantus discontinued, SSI coverage PRN with improvement and improvement in mentation as well   · Possible metabolic/toxic encephalopathy in setting of hypoglycemia, sedating medications treated with D50, adjusting medications and monitoring mental status   · Pt's significant other concerned regarding some forgetfulness/memory loss issues and tremors of the bilateral hands, reports parkinsons runs in the patient's family  Recommended outpatient neurology/cognitive testing     Lumbar radiculopathy  Assessment & Plan  · Previous provider reviewed note from neuro surgery 08/04/2022  · Low back pain probably/likely facet joint related    Multiple admissions and ED visits for right-sided back pain that radiates to her legs, more on the right side  Prior MRI demonstrated moderately advanced degenerative lumbar spine disease at multi levels  L4-5 disc herniation more on the right with moderate central canal stenosis  MRI with questionable L4-L5 osteomyelitis/diskitis per neuro surgery and ID highly unlikely  Underwent bilateral L3, L4-L5 dorsal ramus block at [facility] in May 2022 with no improvement in symptoms  · Due to recent fall prior to her previous admission at the beginning of August CT of the right leg was ordered in the ED  No acute displaced fracture, mild osteoarthrosis  Possible cystitis   · Due to possible cystitis on CT scan, UA ordered and not yet obtained  Patient makes minimal urine  · Conservative measures with aqua k pad, bengay, Lyrica  · Continue decreased PRN oxycodone to 2 5 mg and PRN Robaxin, improvement in mentation noted  · NO IV MEDICATIONS PLEASE - DT restlessness has removed iv sites by accident   · PT/OT recommending STR which patient is in agreement with currently   · Pt reporting more pain in the right leg today, increase oxycodone to 2 5 mg Q6H, has not been utilizing her PRN oxycodone very often  · Monitor symptoms, Pt known to me  Upon entry to room pt is clearly uncomfortable   Hemo sessions are cut short as pt unable to tolerate due to pain ?    · Significant other reports that she is not taking pain meds or THC (he doesn't allow this in his home) He states pt was due for neurosx anahi this next week and has seen outpt acute pain   · Consult neurosx -     Elevated troponin  Assessment & Plan  · Suspect non MI troponin elevation secondary to hypertensive urgency on admission   · Also with underlying ESRD on HD  · Denies any chest pain palpitation or shortness of breath, EKG without acute ischemic changes    Constipation  Assessment & Plan  · Continue daily miralax and colace  · Monitor stool output, last BM documented on 8/31    Pulmonary embolus [facility])  Assessment & Plan  · History of DVT and PE 6 years ago  · On Coumadin, subtherapeutic on admission, questionable compliance, does not seem to know which medications she is taking at home   · Maintained on Coumadin 9 mg daily on PTA list   · S/p Coumadin 10 mg/9/9 mg here  · INR improved to 1 7 today, continue at home dose 9 mg daily   · Goal INR 2-3   · No indication to bridge for now unless INR does not improve     Acquired hypothyroidism  Assessment & Plan  · Continue levothyroxine 50 mcg daily    Type 2 diabetes mellitus [facility])  Assessment & Plan  Lab Results   Component Value Date    HGBA1C 9 4 (H) 07/09/2022       Recent Labs     09/02/22  1142 09/02/22  1604 09/02/22 2051 09/03/22  0652   POCGLU 148* 123 176* 112       Blood Sugar Average: Last 72 hrs:  (P) 367 [id number]   · On Lantus 12 units HS and NovoLog sliding scale at home   · Noted to have persistent hypoglycemia while hospitalized   · Lantus discontinued, continue PRN SSi coverage for now  · Endocrinology following, appreciate recommendations  · Encourage PO intake   · QID glucose checks  · Received 2 amps D50 8/31  · Diabetic diet  · Could be contributing to patient's overall presentation     Anemia of chronic disease  Assessment & Plan  · Secondary to end-stage renal disease  · Hemoglobin at baseline at 8 0    Esophageal reflux  Assessment & Plan  · Continue PPI    Anxiety disorder  Assessment & Plan  · Continue Xanax 0 25 mg b i d  p r n , hold for sedation   · Continue Zoloft 75 mg daily    ESRD on hemodialysis  Assessment & Plan  Lab Results   Component Value Date    EGFR 5 09/01/2022    EGFR 6 08/31/2022    EGFR 10 08/30/2022    CREATININE 8 05 (H) 09/01/2022    CREATININE 6 46 (H) 08/31/2022    CREATININE 4 35 (H) 08/30/2022   · On dialysis TTS  · Nephrology consulted for HD management, plan for HD tomorrow    Essential hypertension  Assessment & Plan  · Hypertensive urgency noted on admission, likely in setting of missing dose of coreg on day of admit   · Overall much improved, BP stabilized   · Continue Coreg 25 mg BID  · Torsemide and nifedipine were discontinued on previous admission 8/15 due to hypotension  · Monitor       VTE Pharmacologic Prophylaxis: VTE Score: 3 High Risk (Score >/= 5) - Pharmacological DVT Prophylaxis Ordered: warfarin (Coumadin)  Sequential Compression Devices Ordered  Patient Centered Rounds: I performed bedside rounds with nursing staff today  Discussions with Specialists or Other Care Team Provider: nursing / neurosurgery     Education and Discussions with Family / Patient: Updated  (significant other) at bedside  Time Spent for Care: 60 minutes  More than 50% of total time spent on counseling and coordination of care as described above  Current Length of Stay: 3 day(s)  Current Patient Status: Inpatient   Certification Statement: The patient will continue to require additional inpatient hospital stay due to uncontrolled back pain   Discharge Plan: Anticipate discharge in 48-72 hrs to rehab facility  / Discussion had with the pt and significant other    Code Status: Level 1 - Full Code    Subjective:   Patient very restless today she cannot lay still laying on her right side for some relief  Pt is distracted and reports severe pain  Per rn pt has not been able to tolerated hemo full session has returned every day about an hr early  Objective:     Vitals:   Temp (24hrs), Av 1 °F (36 7 °C), Min:97 5 °F (36 4 °C), Max:98 6 °F (37 °C)    Temp:  [97 5 °F (36 4 °C)-98 6 °F (37 °C)] 98 6 °F (37 °C)  HR:  [55-80] 55  Resp:  [15-18] 18  BP: ()/() 126/98  There is no height or weight on file to calculate BMI  Input and Output Summary (last 24 hours):      Intake/Output Summary (Last 24 hours) at 9/3/2022 1411  Last data filed at 9/3/2022 1130  Gross per 24 hour   Intake 1060 ml   Output --   Net 1060 ml       Physical Exam:   Physical Exam  Constitutional:       General: She is not in acute distress  Appearance: She is obese  She is not ill-appearing, toxic-appearing or diaphoretic  HENT:      Head: Normocephalic and atraumatic  Mouth/Throat:      Pharynx: Oropharynx is clear  Eyes:      General:         Right eye: No discharge  Left eye: No discharge  Cardiovascular:      Rate and Rhythm: Normal rate  Heart sounds: No murmur heard  No friction rub  No gallop  Pulmonary:      Effort: No respiratory distress  Breath sounds: No stridor  No wheezing, rhonchi or rales  Chest:      Chest wall: No tenderness  Abdominal:      General: There is no distension  Palpations: There is no mass  Tenderness: There is no abdominal tenderness  There is no guarding or rebound  Hernia: No hernia is present  Musculoskeletal:         General: No swelling, tenderness, deformity or signs of injury  Right lower leg: No edema  Left lower leg: No edema  Skin:     Coloration: Skin is not jaundiced or pale  Findings: No bruising, erythema, lesion or rash  Neurological:      Mental Status: She is alert and oriented to person, place, and time        Comments: However forgetful and not able to focus it seems    Psychiatric:      Comments: Restless in pain           Additional Data:     Labs:  Results from last 7 days   Lab Units 09/03/22  0927 08/30/22  1123   WBC Thousand/uL 4 62 6 16   HEMOGLOBIN g/dL 8 0* 8 3*   HEMATOCRIT % 25 4* 26 6*   PLATELETS Thousands/uL 160 187   NEUTROS PCT %  --  70   LYMPHS PCT %  --  17   MONOS PCT %  --  9   EOS PCT %  --  2     Results from last 7 days   Lab Units 09/01/22  0822 08/31/22  1022 08/30/22  1123   SODIUM mmol/L 137   < > 136   POTASSIUM mmol/L 4 4   < > 3 6   CHLORIDE mmol/L 101   < > 99   CO2 mmol/L 32   < > 30   BUN mg/dL 28*   < > 14   CREATININE mg/dL 8 05*   < > 4 35*   ANION GAP mmol/L 4   < > 7   CALCIUM mg/dL 8 8   < > 9 6   ALBUMIN g/dL  --   --  3 1*   TOTAL BILIRUBIN mg/dL  --   -- 0  80   ALK PHOS U/L  --   --  102   ALT U/L  --   --  17   AST U/L  --   --  18   GLUCOSE RANDOM mg/dL 123   < > 103    < > = values in this interval not displayed       Results from last 7 days   Lab Units 09/03/22  0927   INR  2 01*     Results from last 7 days   Lab Units 09/03/22  1334 09/03/22  0652 09/02/22  2051 09/02/22  1604 09/02/22  1142 09/02/22  0556 09/01/22  2100 09/01/22  1628 09/01/22  1154 09/01/22  0528 08/31/22  2116 08/31/22  1714   POC GLUCOSE mg/dl 163* 112 176* 123 148* 153* 168* 191* 120 140 178* 130               Lines/Drains:  Invasive Devices  Report    Line  Duration           Hemodialysis AV Fistula 02/20/18 Left Forearm 1655 days                      Imaging: Reviewed radiology reports from this admission including: CT head    Recent Cultures (last 7 days):         Last 24 Hours Medication List:   Current Facility-Administered Medications   Medication Dose Route Frequency Provider Last Rate    acetaminophen  650 mg Oral Q6H PRN Miguel Swartz MD      acetaminophen  975 mg Oral Q8H [address] Miguel Swartz MD      albuterol  2 puff Inhalation Q6H PRN Miguel Swartz MD      ALPRAZolam  0 25 mg Oral BID PRN Miguel Swartz MD      atorvastatin  20 mg Oral Daily With Jenelle Vaughan MD      b complex-vitamin C-folic acid  1 capsule Oral Daily With Jenelle Vaughan MD      calcitriol  0 25 mcg Oral Once per day on Tue Thu Sat LOIS Nunes      carvedilol  25 mg Oral BID Miguel Swartz MD      docusate sodium  100 mg Oral BID Gaye Hernandez PA-C      gabapentin  100 mg Oral TID Miguel Swartz MD      heparin (porcine)  5,000 Units Subcutaneous Q8H [address] Miguel Swartz MD      hydrALAZINE  10 mg Intravenous Q6H PRN Miguel Swartz MD      hydrOXYzine HCL  25 mg Oral Q6H PRN Miguel Swartz MD      iron sucrose  50 mg Intravenous After Dialysis Zoran Dee MD      levothyroxine  50 mcg Oral Early Morning Miguel Swartz MD  lidocaine  1 patch Topical Daily Lottie Oliveira MD      loratadine  5 mg Oral Daily Dominguez Knowles MD      melatonin  3 mg Oral HS Dominguez Knowles MD      menthol-methyl salicylate   Apply externally 4x Daily PRN Dominguez Knowles MD      methocarbamol  500 mg Oral Q6H PRN Viviane Palma PA-C      ondansetron  4 mg Oral Q6H PRN LOIS Del Castillo      oxyCODONE  2 5 mg Oral Q6H PRN Viviane Palma PA-C      pantoprazole  40 mg Oral BID Dominguez Knowles MD      polyethylene glycol  17 g Oral Daily Maile Barr PA-C      pregabalin  50 mg Oral Daily Dominguez Knowles MD      senna  2 tablet Oral HS PRN Dominguez Knowles MD      sertraline  75 mg Oral Daily Dominguez Knowles MD      sevelamer  1,600 mg Oral TID With Meals Dominguez Knowles MD      warfarin  9 mg Oral Daily (warfarin) Viviane Palma PA-C          Today, Patient Was Seen By: LOIS Del Castillo    **Please Note: This note may have been constructed using a voice recognition system  **

## 2022-09-03 NOTE — ASSESSMENT & PLAN NOTE
· Previous provider reviewed note from neuro surgery 08/04/2022  · Low back pain probably/likely facet joint related  Multiple admissions and ED visits for right-sided back pain that radiates to her legs, more on the right side  Prior MRI demonstrated moderately advanced degenerative lumbar spine disease at multi levels  L4-5 disc herniation more on the right with moderate central canal stenosis  MRI with questionable L4-L5 osteomyelitis/diskitis per neuro surgery and ID highly unlikely  Underwent bilateral L3, L4-L5 dorsal ramus block at [facility] in May 2022 with no improvement in symptoms  · Due to recent fall prior to her previous admission at the beginning of August CT of the right leg was ordered in the ED  No acute displaced fracture, mild osteoarthrosis  Possible cystitis   · Due to possible cystitis on CT scan, UA ordered and not yet obtained  Patient makes minimal urine  · Conservative measures with aqua k pad, bengay, Lyrica  · Continue decreased PRN oxycodone to 2 5 mg and PRN Robaxin, improvement in mentation noted  · NO IV MEDICATIONS PLEASE - DT restlessness has removed iv sites by accident   · PT/OT recommending STR which patient is in agreement with currently   · Pt reporting more pain in the right leg today, increase oxycodone to 2 5 mg Q6H, has not been utilizing her PRN oxycodone very often  · Monitor symptoms, Pt known to me  Upon entry to room pt is clearly uncomfortable   Hemo sessions are cut short as pt unable to tolerate due to pain ?    · Significant other reports that she is not taking pain meds or THC (he doesn't allow this in his home) He states pt was due for neurosx anahi this next week and has seen outpt acute pain   · Consult neurosx -

## 2022-09-03 NOTE — PLAN OF CARE
3 5 hour treatment UF 1kg as tolerated  3K bath serum K+ 4 4 on 9/1/22  Problem: METABOLIC, FLUID AND ELECTROLYTES - ADULT  Goal: Electrolytes maintained within normal limits  Description: INTERVENTIONS:  - Monitor labs and assess patient for signs and symptoms of electrolyte imbalances  - Administer electrolyte replacement as ordered  - Monitor response to electrolyte replacements, including repeat lab results as appropriate  - Instruct patient on fluid and nutrition as appropriate  Outcome: Progressing  Goal: Fluid balance maintained  Description: INTERVENTIONS:  - Monitor labs   - Monitor I/O and WT  - Instruct patient on fluid and nutrition as appropriate  - Assess for signs & symptoms of volume excess or deficit  Outcome: Progressing   Post-Dialysis RN Treatment Note    Blood Pressure:  Pre 121/97 mm/Hg  Post 126/98 mmHg   EDW  122 kg    Weight:  Pre 119 0 kg   Post 118 2  kg   Mode of weight measurement: Bed Scale   Volume Removed  800 ml    Treatment duration 210 minutes    NS given  No    Treatment shortened? yes   Medications given during Rx N/A   Estimated Kt/V  0 61   Access type: AV fistula   Access Issues: Pt moving during tx pt requested to come off early  Needle dislodged at takeoff from pt movement RN at bedside able to clamp access and terminate tx     Report called to primary nurse   Yes

## 2022-09-03 NOTE — ASSESSMENT & PLAN NOTE
Lab Results   Component Value Date    HGBA1C 9 4 (H) 07/09/2022       Recent Labs     09/02/22  1142 09/02/22  1604 09/02/22 2051 09/03/22  0652   POCGLU 148* 123 176* 112       Blood Sugar Average: Last 72 hrs:  (P) 472 [id number]   · On Lantus 12 units HS and NovoLog sliding scale at home   · Noted to have persistent hypoglycemia while hospitalized   · Lantus discontinued, continue PRN SSi coverage for now  · Endocrinology following, appreciate recommendations  · Encourage PO intake   · QID glucose checks  · Received 2 amps D50 8/31  · Diabetic diet  · Could be contributing to patient's overall presentation

## 2022-09-03 NOTE — PROGRESS NOTES
NEPHROLOGY PROGRESS NOTE   Raine Babb 56 y o  female MRN: [id number]  Unit/Bed#: CW2 210-01 Encounter: [id number]      ASSESSMENT & PLAN:  1  ESRD on HD TTS at [address]  Patient seen and examined during dialysis treatment following her outpatient schedule  Her AV fistula is cannulated with good blood flow, blood pressure is stable  Will attempt UF to bring her to her dry weight  Cautious with inter dialytic hypotension    2  Altered mental status, reported decreased level of consciousness after dialysis as an outpatient on 8/30, further workup per primary team     3  Hypoglycemia, type 2 diabetes, endocrinology on board    4  Hemodynamics, blood pressure stable, continue blood pressure medications and UF on dialysis    5  Anemia secondary to chronic kidney disease, monitor H&H and transfusion for hemoglobin less than 7  No MITCHELL due to history of PE    6  Mineral bone disease, continue with renal diet and binders with meals    7  Access, left upper extremity AV fistula in place cannulated with good blood flow    8   Lumbar radiculopathy, patient continues with right-sided lower back pain, pain management per primary team, caution with sedation given prior decreased level of consciousness      SUBJECTIVE:  Patient seen and examined during dialysis treatment, continues complaining of right-sided lower back pain, denies any chest pain, no shortness of breath, no abdominal pain, no nausea, no vomiting    OBJECTIVE:  Current Weight:    Vitals:    09/03/22 0723   BP: 131/50   Pulse:    Resp: 15   Temp: 97 5 °F (36 4 °C)   SpO2:        Intake/Output Summary (Last 24 hours) at 9/3/2022 1686  Last data filed at 9/3/2022 0800  Gross per 24 hour   Intake 560 ml   Output --   Net 560 ml     General: conscious, cooperative, in not acute distress  Eyes: conjunctivae pink, anicteric sclerae  ENT: lips and mucous membranes moist, on room air  Neck: supple, no JVD  Chest: clear breath sounds bilateral, no crackles, ronchus or wheezings  CVS: distinct S1 & S2, normal rate, regular rhythm  Abdomen:  Obese, non-tender, non-distended, normoactive bowel sounds  Extremities: no significant edema of both legs, left upper extremity AV fistula in place cannulated with good blood flow  Skin: no rash  Neuro: awake, alert, oriented          Medications:    Current Facility-Administered Medications:     acetaminophen (TYLENOL) tablet 650 mg, 650 mg, Oral, Q6H PRN, Lisa Vazquez MD, 650 mg at 08/30/22 2303    acetaminophen (TYLENOL) tablet 975 mg, 975 mg, Oral, Q8H [address], Lottie Oliveira MD, 975 mg at 09/03/22 0655    albuterol (PROVENTIL HFA,VENTOLIN HFA) inhaler 2 puff, 2 puff, Inhalation, Q6H PRN, Lisa Vazquez MD    ALPRAZolam Kiera Laser) tablet 0 25 mg, 0 25 mg, Oral, BID PRN, Lisa Vazquez MD, 0 25 mg at 09/02/22 2234    atorvastatin (LIPITOR) tablet 20 mg, 20 mg, Oral, Daily With Paula Gavin MD, 20 mg at 09/02/22 1557    b complex-vitamin C-folic acid (NEPHROCAPS) capsule 1 capsule, 1 capsule, Oral, Daily With Dinner, Lisa Vazquez MD, 1 capsule at 09/02/22 1557    calcitriol (ROCALTROL) capsule 0 25 mcg, 0 25 mcg, Oral, Once per day on Tue Thu Sat, Lannette Romberg Fisk, CRNP, 0 25 mcg at 09/03/22 0849    carvedilol (COREG) tablet 25 mg, 25 mg, Oral, BID, Lottie Oliveira MD, 25 mg at 09/03/22 0850    docusate sodium (COLACE) capsule 100 mg, 100 mg, Oral, BID, Cherylene Hartigan, PA-C, 100 mg at 09/03/22 0851    gabapentin (NEURONTIN) capsule 100 mg, 100 mg, Oral, TID, Lottie Oliveira MD, 100 mg at 09/03/22 0849    heparin (porcine) subcutaneous injection 5,000 Units, 5,000 Units, Subcutaneous, Q8H [address], Lisa Vazquez MD, 5,000 Units at 09/03/22 0656    hydrALAZINE (APRESOLINE) injection 10 mg, 10 mg, Intravenous, Q6H PRN, Lisa Vazquez MD    hydrOXYzine HCL (ATARAX) tablet 25 mg, 25 mg, Oral, Q6H PRN, Lisa Vazquez MD, 25 mg at 08/30/22 8074    iron sucrose (VENOFER) 50 mg in sodium chloride 0 9 % 100 mL IVPB, 50 mg, Intravenous, After Dialysis, Chema Zuluaga MD    levothyroxine tablet 50 mcg, 50 mcg, Oral, Early Morning, Lottie Oliveira MD, 50 mcg at 09/03/22 0655    lidocaine (LIDODERM) 5 % patch 1 patch, 1 patch, Topical, Daily, Carri Garcia MD, 1 patch at 09/03/22 0851    loratadine (CLARITIN) tablet 5 mg, 5 mg, Oral, Daily, Lottie Oliveira MD, 5 mg at 09/03/22 0847    melatonin tablet 3 mg, 3 mg, Oral, HS, Lottie Oliveira MD, 3 mg at 09/02/22 2239    menthol-methyl salicylate (BENGAY) 40-96 % cream, , Apply externally, 4x Daily PRN, Carri Garcia MD, Given at 09/01/22 2205    methocarbamol (ROBAXIN) tablet 500 mg, 500 mg, Oral, Q6H PRN, Layla Rinne, PA-C, 500 mg at 09/03/22 0851    ondansetron (ZOFRAN) injection 4 mg, 4 mg, Intravenous, Q6H PRN, Carri Garcia MD, 4 mg at 09/02/22 1205    oxyCODONE (ROXICODONE) IR tablet 2 5 mg, 2 5 mg, Oral, Q6H PRN, Layla Rinne, PA-C, 2 5 mg at 09/03/22 0849    pantoprazole (PROTONIX) EC tablet 40 mg, 40 mg, Oral, BID, Lottie Oliveira MD, 40 mg at 09/03/22 0850    polyethylene glycol (MIRALAX) packet 17 g, 17 g, Oral, Daily, Layla Rinne, PA-C, 17 g at 09/03/22 0847    pregabalin (LYRICA) capsule 50 mg, 50 mg, Oral, Daily, Lottie Oliveira MD, 50 mg at 09/03/22 0850    senna (SENOKOT) tablet 17 2 mg, 2 tablet, Oral, HS PRN, Carri Garcia MD    sertraline (ZOLOFT) tablet 75 mg, 75 mg, Oral, Daily, Lottie Oliveira MD, 75 mg at 09/03/22 0849    sevelamer (RENAGEL) tablet 1,600 mg, 1,600 mg, Oral, TID With Meals, Carri Garcia MD, 1,600 mg at 09/03/22 0654    warfarin (COUMADIN) tablet 9 mg, 9 mg, Oral, Daily (warfarin), Layla Rinne, PA-C, 9 mg at 09/02/22 2234    Invasive Devices:        Lab Results:   Results from last 7 days   Lab Units 09/01/22  0822 08/31/22  1022 08/30/22  1123   WBC Thousand/uL  --   --  6 16   HEMOGLOBIN g/dL  --   --  8 3*   HEMATOCRIT %  --   --  26 6*   PLATELETS Thousands/uL  -- --  187   SODIUM mmol/L 137 135 136   POTASSIUM mmol/L 4 4 3 8 3 6   CHLORIDE mmol/L 101 100 99   CO2 mmol/L 32 31 30   BUN mg/dL 28* 19 14   CREATININE mg/dL 8 05* 6 46* 4 35*   CALCIUM mg/dL 8 8 9 1 9 6   MAGNESIUM mg/dL  --  2 3  --    ALK PHOS U/L  --   --  102   ALT U/L  --   --  17   AST U/L  --   --  18       Previous work up:  See previous notes      Portions of the record may have been created with voice recognition software  Occasional wrong word or "sound a like" substitutions may have occurred due to the inherent limitations of voice recognition software  Read the chart carefully and recognize, using context, where substitutions have occurred  If you have any questions, please contact the dictating provider

## 2022-09-03 NOTE — PLAN OF CARE
Problem: MOBILITY - ADULT  Goal: Maintain or return to baseline ADL function  Description: INTERVENTIONS:  -  Assess patient's ability to carry out ADLs; assess patient's baseline for ADL function and identify physical deficits which impact ability to perform ADLs (bathing, care of mouth/teeth, toileting, grooming, dressing, etc )  - Assess/evaluate cause of self-care deficits   - Assess range of motion  - Assess patient's mobility; develop plan if impaired  - Assess patient's need for assistive devices and provide as appropriate  - Encourage maximum independence but intervene and supervise when necessary  - Involve family in performance of ADLs  - Assess for home care needs following discharge   - Consider OT consult to assist with ADL evaluation and planning for discharge  - Provide patient education as appropriate  Outcome: Progressing     Problem: PAIN - ADULT  Goal: Verbalizes/displays adequate comfort level or baseline comfort level  Description: Interventions:  - Encourage patient to monitor pain and request assistance  - Assess pain using appropriate pain scale  - Administer analgesics based on type and severity of pain and evaluate response  - Implement non-pharmacological measures as appropriate and evaluate response  - Consider cultural and social influences on pain and pain management  - Notify physician/advanced practitioner if interventions unsuccessful or patient reports new pain  Outcome: Progressing     Problem: INFECTION - ADULT  Goal: Absence or prevention of progression during hospitalization  Description: INTERVENTIONS:  - Assess and monitor for signs and symptoms of infection  - Monitor lab/diagnostic results  - Monitor all insertion sites, i e  indwelling lines, tubes, and drains  - Monitor endotracheal if appropriate and nasal secretions for changes in amount and color  - [place] appropriate cooling/warming therapies per order  - Administer medications as ordered  - Instruct and encourage patient and family to use good hand hygiene technique  - Identify and instruct in appropriate isolation precautions for identified infection/condition  Outcome: Progressing     Problem: INFECTION - ADULT  Goal: Absence of fever/infection during neutropenic period  Description: INTERVENTIONS:  - Monitor WBC    Outcome: Progressing     Problem: METABOLIC, FLUID AND ELECTROLYTES - ADULT  Goal: Electrolytes maintained within normal limits  Description: INTERVENTIONS:  - Monitor labs and assess patient for signs and symptoms of electrolyte imbalances  - Administer electrolyte replacement as ordered  - Monitor response to electrolyte replacements, including repeat lab results as appropriate  - Instruct patient on fluid and nutrition as appropriate  Outcome: Progressing

## 2022-09-04 LAB
ALBUMIN SERPL BCP-MCNC: 2.7 G/DL (ref 3.5–5)
ALP SERPL-CCNC: 105 U/L (ref 46–116)
ALT SERPL W P-5'-P-CCNC: 20 U/L (ref 12–78)
AMMONIA PLAS-SCNC: 22 UMOL/L (ref 11–35)
ANION GAP SERPL CALCULATED.3IONS-SCNC: 2 MMOL/L (ref 4–13)
AST SERPL W P-5'-P-CCNC: 19 U/L (ref 5–45)
BASOPHILS # BLD AUTO: 0.03 THOUSANDS/ΜL (ref 0–0.1)
BASOPHILS NFR BLD AUTO: 1 % (ref 0–1)
BILIRUB SERPL-MCNC: 0.75 MG/DL (ref 0.2–1)
BUN SERPL-MCNC: 32 MG/DL (ref 5–25)
CALCIUM ALBUM COR SERPL-MCNC: 9.6 MG/DL (ref 8.3–10.1)
CALCIUM SERPL-MCNC: 8.6 MG/DL (ref 8.3–10.1)
CHLORIDE SERPL-SCNC: 100 MMOL/L (ref 96–108)
CO2 SERPL-SCNC: 33 MMOL/L (ref 21–32)
CREAT SERPL-MCNC: 7.51 MG/DL (ref 0.6–1.3)
EOSINOPHIL # BLD AUTO: 0.16 THOUSAND/ΜL (ref 0–0.61)
EOSINOPHIL NFR BLD AUTO: 4 % (ref 0–6)
ERYTHROCYTE [DISTWIDTH] IN BLOOD BY AUTOMATED COUNT: 14.6 % (ref 11.6–15.1)
FRUCTOSAMINE SERPL-SCNC: 315 UMOL/L (ref 0–285)
GFR SERPL CREATININE-BSD FRML MDRD: 5 ML/MIN/1.73SQ M
GLUCOSE SERPL-MCNC: 138 MG/DL (ref 65–140)
GLUCOSE SERPL-MCNC: 144 MG/DL (ref 65–140)
GLUCOSE SERPL-MCNC: 160 MG/DL (ref 65–140)
GLUCOSE SERPL-MCNC: 166 MG/DL (ref 65–140)
GLUCOSE SERPL-MCNC: 190 MG/DL (ref 65–140)
HCT VFR BLD AUTO: 24.1 % (ref 34.8–46.1)
HGB BLD-MCNC: 7.6 G/DL (ref 11.5–15.4)
IMM GRANULOCYTES # BLD AUTO: 0.02 THOUSAND/UL (ref 0–0.2)
IMM GRANULOCYTES NFR BLD AUTO: 1 % (ref 0–2)
INR PPP: 3.02 (ref 0.84–1.19)
LYMPHOCYTES # BLD AUTO: 0.89 THOUSANDS/ΜL (ref 0.6–4.47)
LYMPHOCYTES NFR BLD AUTO: 21 % (ref 14–44)
MCH RBC QN AUTO: 30.6 PG (ref 26.8–34.3)
MCHC RBC AUTO-ENTMCNC: 31.5 G/DL (ref 31.4–37.4)
MCV RBC AUTO: 97 FL (ref 82–98)
MONOCYTES # BLD AUTO: 0.49 THOUSAND/ΜL (ref 0.17–1.22)
MONOCYTES NFR BLD AUTO: 12 % (ref 4–12)
NEUTROPHILS # BLD AUTO: 2.66 THOUSANDS/ΜL (ref 1.85–7.62)
NEUTS SEG NFR BLD AUTO: 61 % (ref 43–75)
NRBC BLD AUTO-RTO: 0 /100 WBCS
PLATELET # BLD AUTO: 139 THOUSANDS/UL (ref 149–390)
PMV BLD AUTO: 11.1 FL (ref 8.9–12.7)
POTASSIUM SERPL-SCNC: 4.9 MMOL/L (ref 3.5–5.3)
PROT SERPL-MCNC: 6.1 G/DL (ref 6.4–8.4)
PROTHROMBIN TIME: 31.6 SECONDS (ref 11.6–14.5)
RBC # BLD AUTO: 2.48 MILLION/UL (ref 3.81–5.12)
SODIUM SERPL-SCNC: 135 MMOL/L (ref 135–147)
WBC # BLD AUTO: 4.25 THOUSAND/UL (ref 4.31–10.16)

## 2022-09-04 PROCEDURE — 80053 COMPREHEN METABOLIC PANEL: CPT | Performed by: NURSE PRACTITIONER

## 2022-09-04 PROCEDURE — 99232 SBSQ HOSP IP/OBS MODERATE 35: CPT | Performed by: NURSE PRACTITIONER

## 2022-09-04 PROCEDURE — 82948 REAGENT STRIP/BLOOD GLUCOSE: CPT

## 2022-09-04 PROCEDURE — 85025 COMPLETE CBC W/AUTO DIFF WBC: CPT | Performed by: NURSE PRACTITIONER

## 2022-09-04 PROCEDURE — 99223 1ST HOSP IP/OBS HIGH 75: CPT | Performed by: NURSE PRACTITIONER

## 2022-09-04 PROCEDURE — 82140 ASSAY OF AMMONIA: CPT | Performed by: NURSE PRACTITIONER

## 2022-09-04 PROCEDURE — 85610 PROTHROMBIN TIME: CPT | Performed by: NURSE PRACTITIONER

## 2022-09-04 RX ORDER — LACTULOSE 20 G/30ML
30 SOLUTION ORAL ONCE
Status: COMPLETED | OUTPATIENT
Start: 2022-09-04 | End: 2022-09-04

## 2022-09-04 RX ADMIN — SEVELAMER HYDROCHLORIDE 1600 MG: 800 TABLET ORAL at 11:00

## 2022-09-04 RX ADMIN — ACETAMINOPHEN 975 MG: 325 TABLET ORAL at 21:27

## 2022-09-04 RX ADMIN — CARVEDILOL 25 MG: 25 TABLET, FILM COATED ORAL at 10:28

## 2022-09-04 RX ADMIN — LEVOTHYROXINE SODIUM 50 MCG: 50 TABLET ORAL at 06:09

## 2022-09-04 RX ADMIN — ATORVASTATIN CALCIUM 20 MG: 20 TABLET, FILM COATED ORAL at 17:00

## 2022-09-04 RX ADMIN — PANTOPRAZOLE SODIUM 40 MG: 40 TABLET, DELAYED RELEASE ORAL at 17:01

## 2022-09-04 RX ADMIN — WARFARIN SODIUM 9 MG: 7.5 TABLET ORAL at 17:01

## 2022-09-04 RX ADMIN — METHOCARBAMOL 500 MG: 500 TABLET ORAL at 10:28

## 2022-09-04 RX ADMIN — ACETAMINOPHEN 975 MG: 325 TABLET ORAL at 17:00

## 2022-09-04 RX ADMIN — ACETAMINOPHEN 975 MG: 325 TABLET ORAL at 06:09

## 2022-09-04 RX ADMIN — Medication 1 CAPSULE: at 16:58

## 2022-09-04 RX ADMIN — SEVELAMER HYDROCHLORIDE 1600 MG: 800 TABLET ORAL at 16:58

## 2022-09-04 RX ADMIN — LIDOCAINE 5% 1 PATCH: 700 PATCH TOPICAL at 10:33

## 2022-09-04 RX ADMIN — CARVEDILOL 25 MG: 25 TABLET, FILM COATED ORAL at 17:01

## 2022-09-04 RX ADMIN — PANTOPRAZOLE SODIUM 40 MG: 40 TABLET, DELAYED RELEASE ORAL at 10:26

## 2022-09-04 RX ADMIN — POLYETHYLENE GLYCOL 3350 17 G: 17 POWDER, FOR SOLUTION ORAL at 10:26

## 2022-09-04 RX ADMIN — GABAPENTIN 100 MG: 100 CAPSULE ORAL at 21:27

## 2022-09-04 RX ADMIN — HYDROXYZINE HYDROCHLORIDE 25 MG: 25 TABLET ORAL at 10:26

## 2022-09-04 RX ADMIN — DOCUSATE SODIUM 100 MG: 100 CAPSULE, LIQUID FILLED ORAL at 17:02

## 2022-09-04 RX ADMIN — GABAPENTIN 100 MG: 100 CAPSULE ORAL at 10:28

## 2022-09-04 RX ADMIN — SEVELAMER HYDROCHLORIDE 1600 MG: 800 TABLET ORAL at 10:32

## 2022-09-04 RX ADMIN — DOCUSATE SODIUM 100 MG: 100 CAPSULE, LIQUID FILLED ORAL at 10:28

## 2022-09-04 RX ADMIN — LACTULOSE 30 G: 20 SOLUTION ORAL at 21:26

## 2022-09-04 RX ADMIN — PREGABALIN 50 MG: 50 CAPSULE ORAL at 10:32

## 2022-09-04 RX ADMIN — HEPARIN SODIUM 5000 UNITS: 5000 INJECTION INTRAVENOUS; SUBCUTANEOUS at 06:09

## 2022-09-04 RX ADMIN — OXYCODONE HYDROCHLORIDE 2.5 MG: 5 TABLET ORAL at 10:30

## 2022-09-04 RX ADMIN — GABAPENTIN 100 MG: 100 CAPSULE ORAL at 17:00

## 2022-09-04 RX ADMIN — METHOCARBAMOL 500 MG: 500 TABLET ORAL at 16:59

## 2022-09-04 RX ADMIN — OXYCODONE HYDROCHLORIDE 2.5 MG: 5 TABLET ORAL at 16:59

## 2022-09-04 RX ADMIN — MELATONIN 3 MG: at 21:27

## 2022-09-04 RX ADMIN — HEPARIN SODIUM 5000 UNITS: 5000 INJECTION INTRAVENOUS; SUBCUTANEOUS at 13:00

## 2022-09-04 RX ADMIN — SERTRALINE HYDROCHLORIDE 75 MG: 50 TABLET ORAL at 10:28

## 2022-09-04 RX ADMIN — LORATADINE 5 MG: 10 TABLET ORAL at 10:26

## 2022-09-04 NOTE — ASSESSMENT & PLAN NOTE
· Patient was in her usual state of health, after finishing dialysis on day of admission when she had an episode of decreased level of consciousness  Patient states she was able to hear nurses calling her name but she could not answer  Per EMS no seizure-like activities and shortly she was back at her baseline mentation  She had a sharp right-sided back pain which she has been dealing with since last year  Per ED, it was not reported that she was hypotensive during this episode  · Unclear cause  ?  Transient hypotension which she had in the past during dialysis  Less likely seizure, no seizure-like activities reported per facility, and shortly she was back at her baseline mentation  Possibly pain related vs  Related to hypoglycemia   · Nonfocal neurologic exam  ·  this is waxing and waning - would consider MRI however now pending MRI lumbar spine pt with little ability to sustain position for longer period of time therefore   · CT head negative for acute pathology - consider MRI if ongoing concern  · Telemetry discontinued as pt taking leads off, no events noted   · Pt with persistent hypoglycemia here, endocrinology consulted, lantus discontinued, SSI coverage PRN with improvement and improvement in mentation as well   · Possible metabolic/toxic encephalopathy in setting of hypoglycemia, sedating medications treated with D50, adjusting medications and monitoring mental status   · Pt's significant other concerned regarding some forgetfulness/memory loss issues and tremors of the bilateral hands, reports parkinsons runs in the patient's family   Recommended outpatient neurology/cognitive testing

## 2022-09-04 NOTE — ASSESSMENT & PLAN NOTE
· History of DVT and PE 6 years ago  · On Coumadin, subtherapeutic on admission, questionable compliance, does not seem to know which medications she is taking at home   · Maintained on Coumadin 9 mg daily on PTA list   · S/p Coumadin 10 mg/9/9 mg here  · INR improved to 2 01, continue at home dose 9 mg daily   · Pt has been on heparin sq q8 along with her coumadin   At this time will dc sq heparin   · Goal INR 2-3   · Pt is hard stick in need of INR and labs pending stat RN

## 2022-09-04 NOTE — ASSESSMENT & PLAN NOTE
· Continue daily miralax and colace  · Monitor stool output, last BM documented on 8/31  · Added one time lactulose now

## 2022-09-04 NOTE — PROGRESS NOTES
[address] [facility]  Progress Note Negrito Pineda 1965, 56 y o  female MRN: [id number]  Unit/Bed#: CW2 210-01 Encounter: [id number]  Primary Care Provider: Simeon Aguirre MD   Date and time admitted to hospital: 8/30/2022 10:34 AM    * Lethargy  Assessment & Plan  · Patient was in her usual state of health, after finishing dialysis on day of admission when she had an episode of decreased level of consciousness  Patient states she was able to hear nurses calling her name but she could not answer  Per EMS no seizure-like activities and shortly she was back at her baseline mentation  She had a sharp right-sided back pain which she has been dealing with since last year  Per ED, it was not reported that she was hypotensive during this episode  · Unclear cause  ?  Transient hypotension which she had in the past during dialysis  Less likely seizure, no seizure-like activities reported per facility, and shortly she was back at her baseline mentation  Possibly pain related vs  Related to hypoglycemia   · Nonfocal neurologic exam  ·  this is waxing and waning - would consider MRI however now pending MRI lumbar spine pt with little ability to sustain position for longer period of time therefore   · CT head negative for acute pathology - consider MRI if ongoing concern  · Telemetry discontinued as pt taking leads off, no events noted   · Pt with persistent hypoglycemia here, endocrinology consulted, lantus discontinued, SSI coverage PRN with improvement and improvement in mentation as well   · Possible metabolic/toxic encephalopathy in setting of hypoglycemia, sedating medications treated with D50, adjusting medications and monitoring mental status   · Pt's significant other concerned regarding some forgetfulness/memory loss issues and tremors of the bilateral hands, reports parkinsons runs in the patient's family   Recommended outpatient neurology/cognitive testing     Lumbar radiculopathy  Assessment & Plan  · Previous provider reviewed note from neuro surgery 08/04/2022  · Low back pain probably/likely facet joint related  Multiple admissions and ED visits for right-sided back pain that radiates to her legs, more on the right side  Prior MRI demonstrated moderately advanced degenerative lumbar spine disease at multi levels  L4-5 disc herniation more on the right with moderate central canal stenosis  MRI with questionable L4-L5 osteomyelitis/diskitis per neuro surgery and ID highly unlikely  Underwent bilateral L3, L4-L5 dorsal ramus block at [facility] in May 2022 with no improvement in symptoms  · Due to recent fall prior to her previous admission at the beginning of August CT of the right leg was ordered in the ED  No acute displaced fracture, mild osteoarthrosis  Possible cystitis   · Due to possible cystitis on CT scan, UA ordered and not yet obtained  Patient makes minimal urine  · Conservative measures with aqua k pad, bengay, Lyrica  · Continue decreased PRN oxycodone to 2 5 mg and PRN Robaxin, improvement in mentation noted  · NO IV MEDICATIONS PLEASE - DT restlessness has removed iv sites by accident   · PT/OT recommending STR which patient is in agreement with currently   · Pt reporting more pain in the right leg today, increase oxycodone to 2 5 mg Q6H, has not been utilizing her PRN oxycodone very often  · Monitor symptoms, Pt known to me  Upon entry to room pt is clearly uncomfortable   Hemo sessions are cut short as pt unable to tolerate due to pain ?    · Significant other reports that she is not taking pain meds or THC (he doesn't allow this in his home) He states pt was due for neurosx anahi this next week and has seen outpt acute pain   · Consult neurosx -     Elevated troponin  Assessment & Plan  · Suspect non MI troponin elevation secondary to hypertensive urgency on admission   · Also with underlying ESRD on HD  · Denies any chest pain palpitation or shortness of breath, EKG without acute ischemic changes    Constipation  Assessment & Plan  · Continue daily miralax and colace  · Monitor stool output, last BM documented on 8/31  · Added one time lactulose now     Pulmonary embolus (HCC)  Assessment & Plan  · History of DVT and PE 6 years ago  · On Coumadin, subtherapeutic on admission, questionable compliance, does not seem to know which medications she is taking at home   · Maintained on Coumadin 9 mg daily on PTA list   · S/p Coumadin 10 mg/9/9 mg here  · INR improved to 2 01, continue at home dose 9 mg daily   · Pt has been on heparin sq q8 along with her coumadin   At this time will dc sq heparin   · Goal INR 2-3   · Pt is hard stick in need of INR and labs pending stat RN     Acquired hypothyroidism  Assessment & Plan  · Continue levothyroxine 50 mcg daily    Type 2 diabetes mellitus [facility])  Assessment & Plan  Lab Results   Component Value Date    HGBA1C 9 4 (H) 07/09/2022       Recent Labs     09/03/22  2138 09/04/22  0745 09/04/22  1107 09/04/22  1505   POCGLU 159* 144* 160* 138       Blood Sugar Average: Last 72 hrs:  (P) 150 8   · On Lantus 12 units HS and NovoLog sliding scale at home   · Noted to have persistent hypoglycemia while hospitalized   · Lantus discontinued, continue PRN SSi coverage for now  · Endocrinology following, appreciate recommendations  · Encourage PO intake   · QID glucose checks  · Received 2 amps D50 8/31  · Diabetic diet  · Could be contributing to patient's overall presentation     Anemia of chronic disease  Assessment & Plan  · Secondary to end-stage renal disease  · Hemoglobin at baseline at 8 0, unable to obtain labs this am nursing to reattempt     Esophageal reflux  Assessment & Plan  · Continue PPI    Anxiety disorder  Assessment & Plan  · Continue Xanax 0 25 mg b i d  p r n , hold for sedation   · Continue Zoloft 75 mg daily    ESRD on hemodialysis  Assessment & Plan  Lab Results   Component Value Date EGFR 5 2022    EGFR 6 2022    EGFR 10 2022    CREATININE 8 05 (H) 2022    CREATININE 6 46 (H) 2022    CREATININE 4 35 (H) 2022   · On dialysis TTS  · Nephrology consulted for HD management,  · Pt will need hemo coordinated with MRI imaging     Essential hypertension  Assessment & Plan  · Hypertensive urgency noted on admission, likely in setting of missing dose of coreg on day of admit   · Overall much improved, BP stabilized   · Continue Coreg 25 mg BID  · Torsemide and nifedipine were discontinued on previous admission 8/15 due to hypotension  · Monitor         VTE Pharmacologic Prophylaxis: VTE Score: 3 High Risk (Score >/= 5) - Pharmacological DVT Prophylaxis Ordered: warfarin (Coumadin)  Sequential Compression Devices Ordered  Patient Centered Rounds: I performed bedside rounds with nursing staff today  Discussions with Specialists or Other Care Team Provider: nursing /neurosurgery     Education and Discussions with Family / Patient: patient   Time Spent for Care: 60 minutes  More than 50% of total time spent on counseling and coordination of care as described above  Current Length of Stay: 4 day(s)  Current Patient Status: Inpatient   Certification Statement: The patient will continue to require additional inpatient hospital stay due to uncontrolled pain   Discharge Plan: Anticipate discharge in 48-72 hrs to rehab facility  Code Status: Level 1 - Full Code    Subjective:   Pt is doing better this morning that she was yesterday a little more focused not distracted we were able to have a good conversation  She is having ongoing pain that she reports is so bad  It is midlumbar area down to right hip area as she rubs it  She states she cannot sit well    She has no n/v/d     Objective:     Vitals:   Temp (24hrs), Av 4 °F (36 9 °C), Min:98 2 °F (36 8 °C), Max:98 6 °F (37 °C)    Temp:  [98 2 °F (36 8 °C)-98 6 °F (37 °C)] 98 6 °F (37 °C)  HR:  [72] 72  Resp: [16-20] 16  BP: (140-188)/(49-59) 167/55  SpO2:  [93 %-95 %] 93 %  There is no height or weight on file to calculate BMI  Input and Output Summary (last 24 hours): Intake/Output Summary (Last 24 hours) at 9/4/2022 1800  Last data filed at 9/4/2022 0900  Gross per 24 hour   Intake 120 ml   Output --   Net 120 ml       Physical Exam:   Physical Exam  Constitutional:       General: She is not in acute distress  Appearance: She is obese  She is not ill-appearing, toxic-appearing or diaphoretic  HENT:      Head: Normocephalic and atraumatic  Nose: No congestion  Eyes:      General:         Right eye: No discharge  Left eye: No discharge  Cardiovascular:      Rate and Rhythm: Normal rate  Heart sounds: Murmur heard  No friction rub  No gallop  Pulmonary:      Effort: No respiratory distress  Breath sounds: No stridor  No wheezing, rhonchi or rales  Chest:      Chest wall: No tenderness  Abdominal:      General: There is no distension  Tenderness: There is no abdominal tenderness  There is no guarding  Musculoskeletal:         General: No swelling, tenderness, deformity or signs of injury  Right lower leg: No edema  Left lower leg: No edema  Skin:     Coloration: Skin is not jaundiced or pale  Findings: No bruising, erythema, lesion or rash  Neurological:      Mental Status: She is alert and oriented to person, place, and time     Psychiatric:      Comments: Back to her baseline           Additional Data:     Labs:  Results from last 7 days   Lab Units 09/03/22  0927 08/30/22  1123   WBC Thousand/uL 4 62 6 16   HEMOGLOBIN g/dL 8 0* 8 3*   HEMATOCRIT % 25 4* 26 6*   PLATELETS Thousands/uL 160 187   NEUTROS PCT %  --  70   LYMPHS PCT %  --  17   MONOS PCT %  --  9   EOS PCT %  --  2     Results from last 7 days   Lab Units 09/01/22  0822 08/31/22  1022 08/30/22  1123   SODIUM mmol/L 137   < > 136   POTASSIUM mmol/L 4 4   < > 3 6   CHLORIDE mmol/L 101 < > 99   CO2 mmol/L 32   < > 30   BUN mg/dL 28*   < > 14   CREATININE mg/dL 8 05*   < > 4 35*   ANION GAP mmol/L 4   < > 7   CALCIUM mg/dL 8 8   < > 9 6   ALBUMIN g/dL  --   --  3 1*   TOTAL BILIRUBIN mg/dL  --   --  0 80   ALK PHOS U/L  --   --  102   ALT U/L  --   --  17   AST U/L  --   --  18   GLUCOSE RANDOM mg/dL 123   < > 103    < > = values in this interval not displayed  Results from last 7 days   Lab Units 09/03/22  0927   INR  2 01*     Results from last 7 days   Lab Units 09/04/22  1505 09/04/22  1107 09/04/22  0745 09/03/22  2138 09/03/22  1556 09/03/22  1334 09/03/22  0652 09/02/22  2051 09/02/22  1604 09/02/22  1142 09/02/22  0556 09/01/22  2100   POC GLUCOSE mg/dl 138 160* 144* 159* 167* 163* 112 176* 123 148* 153* 168*               Lines/Drains:  Invasive Devices  Report    Line  Duration           Hemodialysis AV Fistula 02/20/18 Left Forearm 1657 days                      Imaging: No pertinent imaging reviewed      Recent Cultures (last 7 days):         Last 24 Hours Medication List:   Current Facility-Administered Medications   Medication Dose Route Frequency Provider Last Rate    acetaminophen  650 mg Oral Q6H PRN Matt De La Rosa MD      acetaminophen  975 mg Oral Q8H [facility] Matt De La Rosa MD      albuterol  2 puff Inhalation Q6H PRN Matt De La Rosa MD      ALPRAZolam  0 25 mg Oral BID PRN Matt De La Rosa MD      atorvastatin  20 mg Oral Daily With Owen Rodriguez MD      b complex-vitamin C-folic acid  1 capsule Oral Daily With Owen Rodriguez MD      calcitriol  0 25 mcg Oral Once per day on Tue Thu LOIS Ellsworth      carvedilol  25 mg Oral BID Matt De La Rosa MD      docusate sodium  100 mg Oral BID Lara Singh PA-C      gabapentin  100 mg Oral TID Matt De La Rosa MD      hydrALAZINE  10 mg Intravenous Q6H PRN Matt De La Rosa MD      hydrOXYzine HCL  25 mg Oral Q6H PRN Matt De La Rosa MD      iron sucrose  50 mg Intravenous After Dialysis Cholo Sweet MD      lactulose  30 g Oral Once LOIS Ruby      levothyroxine  50 mcg Oral Early Morning Clari Nguyễn MD      lidocaine  1 patch Topical Daily Clari Nguyễn MD      loratadine  5 mg Oral Daily Clari Nguyễn MD      melatonin  3 mg Oral HS Clari Nguyễn MD      menthol-methyl salicylate   Apply externally 4x Daily PRN Clari Nguyễn MD      methocarbamol  500 mg Oral Q6H PRN Ruben Lowe PA-C      ondansetron  4 mg Oral Q6H PRN LOIS Ruby      oxyCODONE  2 5 mg Oral Q6H PRN Ruben Lowe PA-C      pantoprazole  40 mg Oral BID Clari Nguyễn MD      polyethylene glycol  17 g Oral Daily Maile Barr PA-C      pregabalin  50 mg Oral Daily Clari Nguyễn MD      senna  2 tablet Oral HS PRN Clari Nguyễn MD      sertraline  75 mg Oral Daily Clari Nguyễn MD      sevelamer  1,600 mg Oral TID With Meals Clari Nguyễn MD      warfarin  9 mg Oral Daily (warfarin) Ruben Lowe PA-C          Today, Patient Was Seen By: LOIS Ruby    **Please Note: This note may have been constructed using a voice recognition system  **

## 2022-09-04 NOTE — ASSESSMENT & PLAN NOTE
Lab Results   Component Value Date    HGBA1C 9 4 (H) 07/09/2022       Recent Labs     09/03/22  2138 09/04/22  0745 09/04/22  1107 09/04/22  1505   POCGLU 159* 144* 160* 138       Blood Sugar Average: Last 72 hrs:  (P) 150 8   Management per primary team

## 2022-09-04 NOTE — CONSULTS
[address] 1965, 56 y o  female MRN: [id number]  Unit/Bed#: 2 210-01 Encounter: [id number]  Primary Care Provider: Michelle Michael MD   Date and time admitted to hospital: 8/30/2022 10:34 AM    Consult was completed on 09/04/2022 at 4:42 p m  Inpatient consult to Neurosurgery  Consult performed by: LOIS Monroy  Consult ordered by: LOIS Whiteside          Lumbar radiculopathy  Assessment & Plan  Patient complaining of severe right-sided low back pain with right-sided radiculopathy  · Patient was recently hospitalized at the end of July with same complaints  · Previous MRI demonstrated moderately advanced degenerative lumbar spine disease at multi levels  Also with L4-5 disc herniation with moderate central canal stenosis  · Patient also previously hospitalized in early July after she sustained a fall and was found to have [place]  There was some concern at that time for L4-5 osteomyelitis/diskitis  Also with history of right foot diabetic ulcer, osteomyelitis/MRSA infection with prior treatment with IV antibiotics  Status post right foot amputation 2001  Imaging:    MRI lumbar spine w/wo 07/31/2022:MR findings most suggestive for subacute L4-5 osteomyelitis with lesser degree discitis  Noncontrast images appear grossly similar to recent MRI  No paravertebral or epidural abscess  Mild to moderate degenerative canal stenosis at L4-5  Moderate right and mild left foraminal stenosis at this level  Upon further review of the images, in the absence of supporting clinical and paraclinical evidences for infectious process and given end-stage renal disease (on dialysis), amyloid spondyloarthropathy is a differential consideration      Plan:  · Exam: R HF 3-4-/5 and R KF/KE 4/5   · Reviewed prior imaging with patient and significant other  · Recommend repeat MRI lumbar spine with without contrast given poor quality of prior MRI   · Discussed with Internal Medicine as well as Nephrology and MRI  Plan for patient to have MRI lumbar spine done tomorrow afternoon after 5:00 p m  and to have hemodialysis Tuesday and Wednesday following  · APS following, input appreciated  · Spoke with Internal Medicine, during exam patient staring off as well as not acting quite right  Consider MRI brain  · Nephrology following, input appreciated  · Medical management and pain control per primary team  · Patient can have TLSO brace p r n  for comfort  · DVT PPX: SCDs and Coumadin    Neurosurgery will follow from the periphery until MRI lumbar spine completed, call with any further questions or concerns  Pulmonary embolus (HCC)  Assessment & Plan  On Coumadin  Most recent INR 2 01    Type 2 diabetes mellitus [facility])  Assessment & Plan  Lab Results   Component Value Date    HGBA1C 9 4 (H) 07/09/2022       Recent Labs     09/03/22  2138 09/04/22  0745 09/04/22  1107 09/04/22  1505   POCGLU 159* 144* 160* 138       Blood Sugar Average: Last 72 hrs:  (P) 150 8   Management per primary team    ESRD on hemodialysis  Assessment & Plan  Lab Results   Component Value Date    EGFR 5 09/01/2022    EGFR 6 08/31/2022    EGFR 10 08/30/2022    CREATININE 8 05 (H) 09/01/2022    CREATININE 6 46 (H) 08/31/2022    CREATININE 4 35 (H) 08/30/2022   Nephrology following, input appreciated  Patient received hemodialysis Tuesdays, Thursdays, and Saturdays    History of Present Illness     HPI: Josef William is a 56 y o   female with PMH with past medical history significant for diabetes, end-stage kidney disease on hemodialysis, hypertension, cardiomyopathy, chronic pain syndrome, history of DVT and PE on Coumadin, history of right diabetic ulcer/osteomyelitis previously treated with IV antibiotics now status post trans metatarsal right foot amputation in 2021    Patient originally presented to the ED on 08/30 with altered mental status during dialysis treatment and was minimally responsive to staff  Was consulted in regards to ongoing low back pain with right radiculopathy  Patient has had multiple admissions and ED visits for right-sided back pain that radiates into her right leg  Previous MRI demonstrated moderately advanced degenerative lumbar spine disease at multi levels with L4-5 disc herniation with moderate central canal stenosis  Patient did undergo bilateral L3, L4-L5 dorsal ramus block at [facility] in May 2022 with no improvement  She denies seeing physical therapy  Patient states since her last hospitalization her pain and symptoms have worsened  She recently had 1 fall at home when her legs gave out and she fell striking her knees  Patient states some days are worse than others and today is a bad day  Patient is very poor historian and is acting odd during exam, she often stares off and does not respond to questioning and takes multiple verbal cues to follow commands  She is currently complaining of 10/10 right-sided low back pain which radiates into her right posterior buttock and posterior/ lateral right thigh  She denies any symptoms in her left leg  She reports getting out of bed worsens her pain  She states nothing really helps with her pain  She also endorses some nausea at times secondary to pain  She reports tingling in her right leg in the same distribution as her pain  Per chart review and Internal Medicine patient has been cutting dialysis short due to back pain  She denies any headaches, dizziness, blurry vision, chest pain, shortness of breath, abdominal pain, nausea, vomiting, diarrhea, no problems with bowel or bladder, no saddle anesthesia, no new weakness or numbness/tingling  Review of Systems   Constitutional: Negative for activity change, chills and fever  HENT: Negative for tinnitus and trouble swallowing  Eyes: Negative for visual disturbance  Respiratory: Negative for shortness of breath      Cardiovascular: Negative for chest pain  Gastrointestinal: Positive for nausea  Negative for abdominal pain, constipation, diarrhea and vomiting  Genitourinary: Negative for difficulty urinating  Musculoskeletal: Positive for back pain  Negative for gait problem  Neurological: Negative for dizziness, speech difficulty, weakness, light-headedness, numbness and headaches  Hematological: Bruises/bleeds easily         Historical Information   Past Medical History:   Diagnosis Date    Abnormal uterine bleeding (AUB)     Anemia     Anxiety     Arthritis     Chronic kidney disease     Claustrophobia     Diabetes mellitus ([facility] )     Disease of thyroid gland     [facility]deep venous thrombosis) [facility]     End stage kidney disease (HCC)     [facility] to [facility]     Hemodialysis patient ([place] [address] )     Tues, Thurs, Sat    Hypertension     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[place] [address] )     right eye    Morbid obesity ([place] [address] )     Osteomyelitis of fifth toe of right foot ([facility] )     Panic attacks     Pulmonary embolism (HCC)     Reflux esophagitis     Thrombophlebitis of saphenous vein     Warfarin anticoagulation      Past Surgical History:   Procedure Laterality Date    AMPUTATION      r 4th toe    ARTERIOVENOUS GRAFT PLACEMENT      CATARACT EXTRACTION Bilateral     CERVICAL BIOPSY  W/ LOOP ELECTRODE EXCISION       SECTION      DIALYSIS FISTULA CREATION      DILATION AND CURETTAGE OF UTERUS      ENDOMETRIAL ABLATION W/ NOVASURE      EYE SURGERY      HYSTERECTOMY      @ age 57 (complete)    IR AV FISTULAGRAM/GRAFTOGRAM  10/11/2019    IR AV FISTULAGRAM/GRAFTOGRAM  2020    IR AV FISTULAGRAM/GRAFTOGRAM  2020    IR NON-TUNNELED CENTRAL LINE PLACEMENT  2021    IR NON-TUNNELED CENTRAL LINE PLACEMENT  10/4/2021    OOPHORECTOMY Bilateral     @ age 57    PERICARDIUM SURGERY      PA AMPUTATION FOOT,TRANSMETATARSAL Right 2021    Procedure: AMPUTATION TRANSMETATARSAL (TMA);  Surgeon: Yoni Larson David Tripp DPM;  [facility];  Service: Podiatry    MD AMPUTATION TOE,MT-P JT Right 5/28/2020    Procedure: AMPUTATION TOE- 5th;  Surgeon: Ganga Garcia DPM;  [facility];  Service: Podiatry    MD COLONOSCOPY FLX DX W/COLLJ [address] PFRMD N/A 3/13/2019    Procedure: COLONOSCOPY;  Surgeon: Lety Yang MD;  Location: [facility]; Service: Gastroenterology    MD ESOPHAGOGASTRODUODENOSCOPY TRANSORAL DIAGNOSTIC N/A 3/13/2019    Procedure: ESOPHAGOGASTRODUODENOSCOPY (EGD); Surgeon: Lety Yang MD;  [facility];   Service: Gastroenterology    MD LAPAROSCOPY W TOT HYSTERECT UTERUS 250 GRAM OR LESS N/A 2/16/2016    Procedure: HYSTERECTOMY LAPAROSCOPIC TOTAL ([address]), with bilateral salpingectomy;  Surgeon: Aly Suresh DO;  [facility];  Service: Gynecology    THROMBECTOMY / ARTERIOVENOUS GRAFT REVISION      TOE SURGERY Right     removal of the 4th toe    WOUND DEBRIDEMENT Right 10/8/2021    Procedure: R 1st MTPJ washout ;  Surgeon: Dada Gu DPM;  [facility];  Service: Podiatry     Social History     Substance and Sexual Activity   Alcohol Use Never    Alcohol/week: 0 0 standard drinks     Social History     Substance and Sexual Activity   Drug Use No     Social History     Tobacco Use   Smoking Status Never Smoker   Smokeless Tobacco Never Used     Family History   Problem Relation Age of Onset    Heart disease Family     Diabetes Family     Heart disease Mother     Cancer Brother         neck    Throat cancer Brother     Ovarian cancer Maternal Aunt 36       Meds/Allergies   all current active meds have been reviewed, current meds:   Current Facility-Administered Medications   Medication Dose Route Frequency    acetaminophen (TYLENOL) tablet 650 mg  650 mg Oral Q6H PRN    acetaminophen (TYLENOL) tablet 975 mg  975 mg Oral Q8H [address]    albuterol (PROVENTIL HFA,VENTOLIN HFA) inhaler 2 puff  2 puff Inhalation Q6H PRN    ALPRAZolam (XANAX) tablet 0 25 mg  0 25 mg Oral BID PRN    atorvastatin (LIPITOR) tablet 20 mg  20 mg Oral Daily With Dinner    b complex-vitamin C-folic acid (NEPHROCAPS) capsule 1 capsule  1 capsule Oral Daily With Dinner    calcitriol (ROCALTROL) capsule 0 25 mcg  0 25 mcg Oral Once per day on Tue Thu Sat    carvedilol (COREG) tablet 25 mg  25 mg Oral BID    docusate sodium (COLACE) capsule 100 mg  100 mg Oral BID    gabapentin (NEURONTIN) capsule 100 mg  100 mg Oral TID    hydrALAZINE (APRESOLINE) injection 10 mg  10 mg Intravenous Q6H PRN    hydrOXYzine HCL (ATARAX) tablet 25 mg  25 mg Oral Q6H PRN    iron sucrose (VENOFER) 50 mg in sodium chloride 0 9 % 100 mL IVPB  50 mg Intravenous After Dialysis    lactulose oral solution 30 g  30 g Oral Once    levothyroxine tablet 50 mcg  50 mcg Oral Early Morning    lidocaine (LIDODERM) 5 % patch 1 patch  1 patch Topical Daily    loratadine (CLARITIN) tablet 5 mg  5 mg Oral Daily    melatonin tablet 3 mg  3 mg Oral HS    menthol-methyl salicylate (BENGAY) 80-40 % cream   Apply externally 4x Daily PRN    methocarbamol (ROBAXIN) tablet 500 mg  500 mg Oral Q6H PRN    ondansetron (ZOFRAN-ODT) dispersible tablet 4 mg  4 mg Oral Q6H PRN    oxyCODONE (ROXICODONE) IR tablet 2 5 mg  2 5 mg Oral Q6H PRN    pantoprazole (PROTONIX) EC tablet 40 mg  40 mg Oral BID    polyethylene glycol (MIRALAX) packet 17 g  17 g Oral Daily    pregabalin (LYRICA) capsule 50 mg  50 mg Oral Daily    senna (SENOKOT) tablet 17 2 mg  2 tablet Oral HS PRN    sertraline (ZOLOFT) tablet 75 mg  75 mg Oral Daily    sevelamer (RENAGEL) tablet 1,600 mg  1,600 mg Oral TID With Meals    warfarin (COUMADIN) tablet 9 mg  9 mg Oral Daily (warfarin)    and PTA meds:   Prior to Admission Medications   Prescriptions Last Dose Informant Patient Reported? Taking?    ALPRAZolam (XANAX) 0 25 mg tablet  Self Yes No   Sig: Take 0 25 mg by mouth 2 (two) times a day as needed for anxiety   ALPRAZolam (XANAX) 0 5 mg tablet   Yes No   Sig: TAKE 2 AND 1/2 TABLETS DAILY IN DIVIDED DOSES AS DIRECTED  Accu-Chek Guide test strip   Yes No   Sig: use to TEST BLOOD SUGAR two to three times a day   Accu-Chek Softclix Lancets lancets  Self Yes No   Sig: 3 (three) times a day Use to test blood sugar   B Complex-C-Folic Acid (Natalia-Denys) TABS   No No   Sig: TAKE ONE TABLET BY MOUTH DAILY   KIONEX 15 GM/60ML suspension  Self Yes No   Sig: Take by mouth Takes as a shake on dialysis days Tues-Thurs_Sat    Multiple Vitamin (MULTIVITAMIN ADULT PO)   Yes No   Sig: Take 500 mg by mouth in the morning  Indications: one a day menapause multivitamin   NOVOLOG FLEXPEN 100 units/mL SOPN  Self Yes No   Sig: INJECT 1 TO 5 UNITS SUBCUTANEOUSLY THREE TIMES A DAY BEFORE MELAS AS PER SLIDING SCALE   acetaminophen (TYLENOL) 325 mg tablet   No No   Sig: Take 3 tablets (975 mg total) by mouth every 8 (eight) hours   albuterol (ProAir HFA) 90 mcg/act inhaler   No No   Sig: Inhale 2 puffs every 6 (six) hours as needed for wheezing or shortness of breath   ammonium lactate (LAC-HYDRIN) 12 % cream   No No   Sig: Apply topically 2 (two) times a day   atorvastatin (LIPITOR) 20 mg tablet  Self Yes No   Sig: Take 20 mg by mouth every evening    carvedilol (COREG) 25 mg tablet   No No   Sig: TAKE ONE TABLET BY MOUTH TWICE A DAY   dicyclomine (BENTYL) 10 mg capsule   Yes No   Sig: TAKE 1 TABLET BY MOUTH EVERY 6 HOURS OR AS NEEDED FOR PAIN   gabapentin (NEURONTIN) 100 mg capsule   Yes No   Sig: Take 100 mg by mouth 3 (three) times a day   Indications: Neuropathic Pain   insulin glargine (LANTUS) 100 units/mL subcutaneous injection   No No   Sig: Inject 12 Units under the skin daily at bedtime   levothyroxine 50 mcg tablet  Self Yes No   Sig: Take 50 mcg by mouth daily   lidocaine (LIDODERM) 5 %   Yes No   lidocaine (LIDODERM) 5 %   No No   Sig: Apply 1 patch topically daily Remove & Discard patch within 12 hours or as directed by MD   loratadine (CLARITIN) 10 mg tablet  Self Yes No   Sig: Take 10 mg by mouth daily   melatonin 3 mg   No No   Sig: Take 1 tablet (3 mg total) by mouth daily at bedtime   menthol-methyl salicylate (BENGAY) 53-90 % cream   No No   Sig: Apply topically 4 (four) times a day as needed (muscle soreness)   methocarbamol (ROBAXIN) 500 mg tablet   No No   Sig: Take 1 tablet (500 mg total) by mouth every 6 (six) hours   nystatin (MYCOSTATIN) powder  Self No No   Sig: Apply topically 2 (two) times a day   ondansetron (ZOFRAN-ODT) 8 mg disintegrating tablet   Yes No   oxyCODONE (ROXICODONE) 5 immediate release tablet   No No   Sig: Take 0 5 tablets (2 5 mg total) by mouth every 6 (six) hours as needed for moderate pain for up to 10 days Continuation of care Max Daily Amount: 10 mg   pantoprazole (PROTONIX) 40 mg tablet   Yes No   Sig: take 1 tablet by mouth twice a day   polyethylene glycol (MIRALAX) 17 g packet   No No   Sig: Take 17 g by mouth daily   pregabalin (LYRICA) 50 mg capsule   No No   Sig: Take 1 PO daily, take 1 PO additionally directly have HD on HD days   senna (SENOKOT) 8 6 mg  Self No No   Sig: Take 2 tablets (17 2 mg total) by mouth daily at bedtime as needed for constipation   sertraline (ZOLOFT) 50 mg tablet   No No   Sig: Take 1 5 tablets (75 mg total) by mouth daily   sevelamer (RENAGEL) 800 mg tablet   No No   Sig: Take 2 tablets (1,600 mg total) by mouth 3 (three) times a day with meals   urea (CARMOL) 40 %   No No   Sig: APPLY TO AFFECTED AREA TOPICALLY EVERY DAY   warfarin (COUMADIN) 3 mg tablet  Self No No   Sig: Take 3 tablets (9 mg total) by mouth daily Takes 9 mg Monday Sat      Facility-Administered Medications: None     Allergies   Allergen Reactions    Iodinated Diagnostic Agents Itching    Keflex [Cephalexin] Hives    Wound Dressing Adhesive Rash       Objective   I/O       09/02 0701 09/03 0700 09/03 0701 09/04 0700 09/04 0701 09/05 0700    P  O  600 180 0    I V  20 500     Total Intake 620 680 0    Other       Total Output       Net +620 +680 0 Unmeasured Urine Occurrence 1 x            Physical Exam  Vitals reviewed  Constitutional:       General: She is awake  She is not in acute distress  Appearance: Normal appearance  She is well-developed  She is not ill-appearing  HENT:      Head: Normocephalic and atraumatic  Eyes:      Extraocular Movements: Extraocular movements intact and EOM normal       Conjunctiva/sclera: Conjunctivae normal       Pupils: Pupils are equal, round, and reactive to light  Cardiovascular:      Rate and Rhythm: Normal rate  Pulmonary:      Effort: Pulmonary effort is normal  No respiratory distress  Chest:      Chest wall: No tenderness  Abdominal:      General: There is no distension  Palpations: Abdomen is soft  Tenderness: There is no abdominal tenderness  Musculoskeletal:         General: Normal range of motion  Cervical back: Normal range of motion and neck supple  No tenderness  No spinous process tenderness or muscular tenderness  Thoracic back: No tenderness  Lumbar back: Tenderness present  Skin:     General: Skin is warm and dry  Neurological:      Mental Status: She is alert and oriented to person, place, and time  Coordination: Finger-Nose-Finger Test normal       Deep Tendon Reflexes:      Reflex Scores:       Bicep reflexes are 1+ on the right side and 1+ on the left side  Patellar reflexes are 1+ on the right side and 1+ on the left side  Psychiatric:         Attention and Perception: Attention and perception normal          Mood and Affect: Mood and affect normal          Speech: Speech normal          Behavior: Behavior normal  Behavior is cooperative  Thought Content: Thought content normal          Cognition and Memory: Cognition and memory normal          Judgment: Judgment normal        Neurologic Exam     Mental Status   Oriented to person, place, and time  Follows 2 step commands     Attention: normal  Concentration: normal    Speech: speech is normal   Level of consciousness: alert  Knowledge: good  Able to perform simple calculations  Able to name object  Normal comprehension  Cranial Nerves     CN III, IV, VI   Pupils are equal, round, and reactive to light  Extraocular motions are normal    CN III: no CN III palsy  CN VI: no CN VI palsy  Nystagmus: none   Diplopia: none  Conjugate gaze: present    CN V   Facial sensation intact  CN VII   Facial expression full, symmetric  CN VIII   CN VIII normal    Hearing: intact    CN IX, X   CN IX normal      CN XI   CN XI normal      CN XII   CN XII normal      Motor Exam   Muscle bulk: normal  Overall muscle tone: normal  Right arm pronator drift: absent  Left arm pronator drift: absent    Strength   Strength 5/5 except as noted  R HF 3-4-/5 and R KF/KE 4/5     Sensory Exam   Left arm light touch: normal  Left leg light touch: normal  Proprioception normal    Subjective decrease sensation in right arm and leg, upon further questioning patient is unsure       Gait, Coordination, and Reflexes     Coordination   Finger to nose coordination: normal    Tremor   Resting tremor: absent  Intention tremor: absent  Action tremor: absent    Reflexes   Right biceps: 1+  Left biceps: 1+  Right patellar: 1+  Left patellar: 1+  Right Damon: absent  Left Damon: absent  Right ankle clonus: absent  Left ankle clonus: absent      Vitals:Blood pressure 167/55, pulse 72, temperature 98 6 °F (37 °C), resp  rate 16, last menstrual period 02/12/2016, SpO2 93 %, not currently breastfeeding  ,There is no height or weight on file to calculate BMI       Lab Results:   Results from last 7 days   Lab Units 09/03/22  0927 08/30/22  1123   WBC Thousand/uL 4 62 6 16   HEMOGLOBIN g/dL 8 0* 8 3*   HEMATOCRIT % 25 4* 26 6*   PLATELETS Thousands/uL 160 187   NEUTROS PCT %  --  70   MONOS PCT %  --  9     Results from last 7 days   Lab Units 09/01/22  0822 08/31/22  1022 08/30/22  1123   POTASSIUM mmol/L 4 4 3 8 3 6 CHLORIDE mmol/L 101 100 99   CO2 mmol/L 32 31 30   BUN mg/dL 28* 19 14   CREATININE mg/dL 8 05* 6 46* 4 35*   CALCIUM mg/dL 8 8 9 1 9 6   ALK PHOS U/L  --   --  102   ALT U/L  --   --  17   AST U/L  --   --  18     Results from last 7 days   Lab Units 08/31/22  1022   MAGNESIUM mg/dL 2 3         Results from last 7 days   Lab Units 09/03/22  0927 09/02/22  1201 09/01/22  0822 08/31/22  1022 08/30/22  1123   INR  2 01* 1 73* 1 54*   < > 1 13   PTT seconds  --   --   --   --  28    < > = values in this interval not displayed  No results found for: TROPONINT  ABG:No results found for: PHART, VGT0MKD, PO2ART, ZVQ7ZDA, L4ULEMQR, BEART, SOURCE    Imaging Studies: I have personally reviewed pertinent reports  and I have personally reviewed pertinent films in PACS    EKG, Pathology, and Other Studies: I have personally reviewed pertinent reports        VTE Prophylaxis: Sequential compression device (Venodyne)  and Warfarin (Coumadin)    Code Status: Level 1 - Full Code  Advance Directive and Living Will:      Power of :    POLST:

## 2022-09-04 NOTE — ASSESSMENT & PLAN NOTE
· Secondary to end-stage renal disease  · Hemoglobin at baseline at 8 0, unable to obtain labs this am nursing to reattempt

## 2022-09-04 NOTE — ASSESSMENT & PLAN NOTE
Patient complaining of severe right-sided low back pain with right-sided radiculopathy  · Patient was recently hospitalized at the end of July with same complaints  · Previous MRI demonstrated moderately advanced degenerative lumbar spine disease at multi levels  Also with L4-5 disc herniation with moderate central canal stenosis  · Patient also previously hospitalized in early July after she sustained a fall and was found to have [place]  There was some concern at that time for L4-5 osteomyelitis/diskitis  Also with history of right foot diabetic ulcer, osteomyelitis/MRSA infection with prior treatment with IV antibiotics  Status post right foot amputation 2001  Imaging:    MRI lumbar spine w/wo 07/31/2022:MR findings most suggestive for subacute L4-5 osteomyelitis with lesser degree discitis  Noncontrast images appear grossly similar to recent MRI  No paravertebral or epidural abscess  Mild to moderate degenerative canal stenosis at L4-5  Moderate right and mild left foraminal stenosis at this level  Upon further review of the images, in the absence of supporting clinical and paraclinical evidences for infectious process and given end-stage renal disease (on dialysis), amyloid spondyloarthropathy is a differential consideration  Plan:  · Exam: R HF 3-4-/5 and R KF/KE 4/5   · Reviewed prior imaging with patient and significant other  · Recommend repeat MRI lumbar spine with without contrast given poor quality of prior MRI  · Discussed with Internal Medicine as well as Nephrology and MRI  Plan for patient to have MRI lumbar spine done tomorrow afternoon after 5:00 p m  and to have hemodialysis Tuesday and Wednesday following  · APS following, input appreciated  · Spoke with Internal Medicine, during exam patient staring off as well as not acting quite right    Consider MRI brain  · Nephrology following, input appreciated  · Medical management and pain control per primary team  · Patient can have TLSO brace p r n  for comfort  · DVT PPX: SCDs and Coumadin    Neurosurgery will follow from the periphery until MRI lumbar spine completed, call with any further questions or concerns

## 2022-09-04 NOTE — ASSESSMENT & PLAN NOTE
Lab Results   Component Value Date    EGFR 5 09/01/2022    EGFR 6 08/31/2022    EGFR 10 08/30/2022    CREATININE 8 05 (H) 09/01/2022    CREATININE 6 46 (H) 08/31/2022    CREATININE 4 35 (H) 08/30/2022   Nephrology following, input appreciated  Patient received hemodialysis Tuesdays, Thursdays, and Saturdays

## 2022-09-04 NOTE — ASSESSMENT & PLAN NOTE
· Previous provider reviewed note from neuro surgery 08/04/2022  · Low back pain probably/likely facet joint related  Multiple admissions and ED visits for right-sided back pain that radiates to her legs, more on the right side  Prior MRI demonstrated moderately advanced degenerative lumbar spine disease at multi levels  L4-5 disc herniation more on the right with moderate central canal stenosis  MRI with questionable L4-L5 osteomyelitis/diskitis per neuro surgery and ID highly unlikely  Underwent bilateral L3, L4-L5 dorsal ramus block at [facility] in May 2022 with no improvement in symptoms  · Due to recent fall prior to her previous admission at the beginning of August CT of the right leg was ordered in the ED  No acute displaced fracture, mild osteoarthrosis  Possible cystitis   · Due to possible cystitis on CT scan, UA ordered and not yet obtained  Patient makes minimal urine  · Conservative measures with aqua k pad, bengay, Lyrica  · Continue decreased PRN oxycodone to 2 5 mg and PRN Robaxin, improvement in mentation noted  · NO IV MEDICATIONS PLEASE - DT restlessness has removed iv sites by accident   · PT/OT recommending STR which patient is in agreement with currently   · Pt reporting more pain in the right leg today, increase oxycodone to 2 5 mg Q6H, has not been utilizing her PRN oxycodone very often  · Monitor symptoms, Pt known to me  Upon entry to room pt is clearly uncomfortable   Hemo sessions are cut short as pt unable to tolerate due to pain ?    · Significant other reports that she is not taking pain meds or THC (he doesn't allow this in his home) He states pt was due for neurosx anahi this next week and has seen outpt acute pain   · Consult neurosx -

## 2022-09-04 NOTE — ASSESSMENT & PLAN NOTE
Lab Results   Component Value Date    EGFR 5 09/01/2022    EGFR 6 08/31/2022    EGFR 10 08/30/2022    CREATININE 8 05 (H) 09/01/2022    CREATININE 6 46 (H) 08/31/2022    CREATININE 4 35 (H) 08/30/2022   · On dialysis TTS  · Nephrology consulted for HD management,  · Pt will need hemo coordinated with MRI imaging

## 2022-09-05 LAB
GLUCOSE SERPL-MCNC: 126 MG/DL (ref 65–140)
GLUCOSE SERPL-MCNC: 133 MG/DL (ref 65–140)
GLUCOSE SERPL-MCNC: 140 MG/DL (ref 65–140)
GLUCOSE SERPL-MCNC: 168 MG/DL (ref 65–140)

## 2022-09-05 PROCEDURE — 99232 SBSQ HOSP IP/OBS MODERATE 35: CPT | Performed by: INTERNAL MEDICINE

## 2022-09-05 PROCEDURE — 99232 SBSQ HOSP IP/OBS MODERATE 35: CPT | Performed by: NURSE PRACTITIONER

## 2022-09-05 PROCEDURE — 82948 REAGENT STRIP/BLOOD GLUCOSE: CPT

## 2022-09-05 RX ORDER — LACTULOSE 20 G/30ML
30 SOLUTION ORAL ONCE
Status: COMPLETED | OUTPATIENT
Start: 2022-09-05 | End: 2022-09-05

## 2022-09-05 RX ORDER — WARFARIN SODIUM 1 MG/1
4 TABLET ORAL
Status: DISCONTINUED | OUTPATIENT
Start: 2022-09-05 | End: 2022-09-05

## 2022-09-05 RX ORDER — CLONIDINE HYDROCHLORIDE 0.2 MG/1
0.2 TABLET ORAL ONCE
Status: COMPLETED | OUTPATIENT
Start: 2022-09-05 | End: 2022-09-05

## 2022-09-05 RX ORDER — WARFARIN SODIUM 5 MG/1
5 TABLET ORAL
Status: DISCONTINUED | OUTPATIENT
Start: 2022-09-06 | End: 2022-09-06

## 2022-09-05 RX ADMIN — SEVELAMER HYDROCHLORIDE 1600 MG: 800 TABLET ORAL at 16:19

## 2022-09-05 RX ADMIN — METHOCARBAMOL 500 MG: 500 TABLET ORAL at 13:38

## 2022-09-05 RX ADMIN — GABAPENTIN 100 MG: 100 CAPSULE ORAL at 11:36

## 2022-09-05 RX ADMIN — ACETAMINOPHEN 975 MG: 325 TABLET ORAL at 21:17

## 2022-09-05 RX ADMIN — PANTOPRAZOLE SODIUM 40 MG: 40 TABLET, DELAYED RELEASE ORAL at 17:33

## 2022-09-05 RX ADMIN — PREGABALIN 50 MG: 50 CAPSULE ORAL at 11:36

## 2022-09-05 RX ADMIN — OXYCODONE HYDROCHLORIDE 2.5 MG: 5 TABLET ORAL at 13:37

## 2022-09-05 RX ADMIN — LEVOTHYROXINE SODIUM 50 MCG: 50 TABLET ORAL at 05:07

## 2022-09-05 RX ADMIN — GABAPENTIN 100 MG: 100 CAPSULE ORAL at 16:19

## 2022-09-05 RX ADMIN — CARVEDILOL 25 MG: 25 TABLET, FILM COATED ORAL at 17:33

## 2022-09-05 RX ADMIN — CLONIDINE HYDROCHLORIDE 0.2 MG: 0.2 TABLET ORAL at 17:33

## 2022-09-05 RX ADMIN — MELATONIN 3 MG: at 21:17

## 2022-09-05 RX ADMIN — SEVELAMER HYDROCHLORIDE 1600 MG: 800 TABLET ORAL at 11:41

## 2022-09-05 RX ADMIN — ACETAMINOPHEN 975 MG: 325 TABLET ORAL at 05:07

## 2022-09-05 RX ADMIN — DOCUSATE SODIUM 100 MG: 100 CAPSULE, LIQUID FILLED ORAL at 17:33

## 2022-09-05 RX ADMIN — PANTOPRAZOLE SODIUM 40 MG: 40 TABLET, DELAYED RELEASE ORAL at 11:36

## 2022-09-05 RX ADMIN — POLYETHYLENE GLYCOL 3350 17 G: 17 POWDER, FOR SOLUTION ORAL at 11:39

## 2022-09-05 RX ADMIN — DOCUSATE SODIUM 100 MG: 100 CAPSULE, LIQUID FILLED ORAL at 11:36

## 2022-09-05 RX ADMIN — SERTRALINE HYDROCHLORIDE 75 MG: 50 TABLET ORAL at 11:36

## 2022-09-05 RX ADMIN — Medication 1 CAPSULE: at 16:18

## 2022-09-05 RX ADMIN — LACTULOSE 30 G: 20 SOLUTION ORAL at 11:55

## 2022-09-05 RX ADMIN — ATORVASTATIN CALCIUM 20 MG: 20 TABLET, FILM COATED ORAL at 16:19

## 2022-09-05 RX ADMIN — LORATADINE 5 MG: 10 TABLET ORAL at 11:36

## 2022-09-05 RX ADMIN — CARVEDILOL 25 MG: 25 TABLET, FILM COATED ORAL at 11:36

## 2022-09-05 RX ADMIN — ACETAMINOPHEN 975 MG: 325 TABLET ORAL at 13:37

## 2022-09-05 RX ADMIN — GABAPENTIN 100 MG: 100 CAPSULE ORAL at 21:17

## 2022-09-05 NOTE — ASSESSMENT & PLAN NOTE
· Previous provider reviewed note from neuro surgery 08/04/2022  · Low back pain probably/likely facet joint related  Multiple admissions and ED visits for right-sided back pain that radiates to her legs, more on the right side  Prior MRI demonstrated moderately advanced degenerative lumbar spine disease at multi levels  L4-5 disc herniation more on the right with moderate central canal stenosis  MRI with questionable L4-L5 osteomyelitis/diskitis per neuro surgery and ID highly unlikely  Underwent bilateral L3, L4-L5 dorsal ramus block at [facility] in May 2022 with no improvement in symptoms  · Due to recent fall prior to her previous admission at the beginning of August CT of the right leg was ordered in the ED  No acute displaced fracture, mild osteoarthrosis  Possible cystitis   · Due to possible cystitis on CT scan, UA ordered and not yet obtained  Patient makes minimal urine  · Conservative measures with aqua k pad, bengay, Lyrica  · Continue decreased PRN oxycodone to 2 5 mg and PRN Robaxin, improvement in mentation noted  · NO IV MEDICATIONS PLEASE - DT restlessness has removed iv sites by accident   · PT/OT recommending STR which patient is in agreement with currently   · Pt reporting more pain in the right leg today, increase oxycodone to 2 5 mg Q6H, has not been utilizing her PRN oxycodone very often  · Monitor symptoms, Pt known to me  Upon entry to room pt is clearly uncomfortable   Hemo sessions are cut short as pt unable to tolerate due to pain ?    · Significant other reports that she is not taking pain meds or THC (he doesn't allow this in his home) He states pt was due for neurosx anahi this next week and has seen outpt acute pain   · Consult neurosx -

## 2022-09-05 NOTE — ASSESSMENT & PLAN NOTE
· Patient was in her usual state of health, after finishing dialysis on day of admission when she had an episode of decreased level of consciousness  Patient states she was able to hear nurses calling her name but she could not answer  Per EMS no seizure-like activities and shortly she was back at her baseline mentation  She had a sharp right-sided back pain which she has been dealing with since last year  Per ED, it was not reported that she was hypotensive during this episode  · Unclear cause  ?  Transient hypotension which she had in the past during dialysis  Less likely seizure, no seizure-like activities reported per facility, and shortly she was back at her baseline mentation  Possibly pain related vs  Related to hypoglycemia   · Nonfocal neurologic exam  ·  this is waxing and waning - would consider MRI however now pending MRI lumbar spine pt with little ability to sustain position for longer period of time therefore   · CT head negative for acute pathology - consider MRI if ongoing concern  · Telemetry discontinued as pt taking leads off, no events noted   · Pt with persistent hypoglycemia here, endocrinology consulted, lantus discontinued, SSI coverage PRN with improvement and improvement in mentation as well   · Likely metabolic/toxic encephalopathy in setting of hypoglycemia, sedating medications, adjusting medications and monitoring mental status   · I have asked for riaz consult to review her meds  Pt appears to have been on chronic Xanax which was stopped recently  · Pt's significant other concerned regarding some forgetfulness/memory loss issues and tremors of the bilateral hands, reports parkinsons runs in the patient's family   Recommended  neurology/cognitive testing if ongoing  ·  ct head stable possible need for mri

## 2022-09-05 NOTE — PROGRESS NOTES
NEPHROLOGY PROGRESS NOTE   Haleigh Cummins 56 y o  female MRN: [id number]  Unit/Bed#: CW2 210-01 Encounter: [id number]      ASSESSMENT & PLAN:  1  ESRD on HD TTS at [facility] [address]  Plan for hemodialysis tomorrow following outpatient schedule  continue UF to keep her at her dry weight  Discussed with Neurosurgery yesterday via [facility] text, if MRI with gadolinium is done she will need dialysis 2 days in a row    2  Altered mental status, reported decreased level of consciousness after dialysis as an outpatient on 8/30, further workup per primary team     3  Hypoglycemia, type 2 diabetes, endocrinology on board    4  Hemodynamics, blood pressure stable, continue blood pressure medications and UF on dialysis    5  Anemia secondary to chronic kidney disease, monitor H&H and transfusion for hemoglobin less than 7  No MITCHELL due to history of PE    6  Mineral bone disease, continue with renal diet and binders with meals    7  Access, left upper extremity AV fistula in place    8   Lumbar radiculopathy, patient continues with right-sided lower back pain, pain management per primary team, caution with sedation given prior decreased level of consciousness  Neurosurgery consulted, recommend MRI with and without gadolinium, discussed with neurosurgery team yesterday, please coordinate for MRI to be done before dialysis, patient will need dialysis 2 days in a row after if gadolinium issues      SUBJECTIVE:  Patient seen and examined, lying in bed, continues complaining of right-sided lower back pain, denies any chest pain, no shortness of breath, no nausea, no vomiting, no abdominal pain      OBJECTIVE:  Current Weight:    Vitals:    09/04/22 2256   BP: (!) 151/47   Pulse:    Resp:    Temp: 98 7 °F (37 1 °C)   SpO2:        Intake/Output Summary (Last 24 hours) at 9/5/2022 0844  Last data filed at 9/4/2022 1700  Gross per 24 hour   Intake 240 ml   Output --   Net 240 ml     General: conscious, cooperative, in not acute distress  Eyes: conjunctivae pink, anicteric sclerae  ENT: lips and mucous membranes moist  Neck: supple, no JVD  Chest: clear breath sounds bilateral, no crackles, ronchus or wheezings  CVS: distinct S1 & S2, normal rate, regular rhythm  Abdomen:  Obese, non-tender, non-distended, normoactive bowel sounds  Extremities: no edema of both legs, left upper extremity AV fistula in place  Skin: no rash  Neuro: awake, alert, oriented        Medications:    Current Facility-Administered Medications:     acetaminophen (TYLENOL) tablet 650 mg, 650 mg, Oral, Q6H PRN, Alejandro Mitchell MD, 650 mg at 08/30/22 2303    acetaminophen (TYLENOL) tablet 975 mg, 975 mg, Oral, Q8H [address], Lottie Oliveira MD, 975 mg at 09/05/22 0507    albuterol (PROVENTIL HFA,VENTOLIN HFA) inhaler 2 puff, 2 puff, Inhalation, Q6H PRN, Alejandro Mitchell MD    ALPRAZolam [facility]) tablet 0 25 mg, 0 25 mg, Oral, BID PRN, Alejandro Mitchell MD, 0 25 mg at 09/02/22 2234    atorvastatin (LIPITOR) tablet 20 mg, 20 mg, Oral, Daily With Yoan Otoole MD, 20 mg at 09/04/22 1700    b complex-vitamin C-folic acid (NEPHROCAPS) capsule 1 capsule, 1 capsule, Oral, Daily With Dinner, Alejandro Mitchell MD, 1 capsule at 09/04/22 1658    calcitriol (ROCALTROL) capsule 0 25 mcg, 0 25 mcg, Oral, Once per day on Tue Thu Sat, LOIS Zamora, 0 25 mcg at 09/03/22 0849    carvedilol (COREG) tablet 25 mg, 25 mg, Oral, BID, Lottie Oliveira MD, 25 mg at 09/04/22 1701    docusate sodium (COLACE) capsule 100 mg, 100 mg, Oral, BID, Niecy Pino PA-C, 100 mg at 09/04/22 1702    gabapentin (NEURONTIN) capsule 100 mg, 100 mg, Oral, TID, Lottie Oliveira MD, 100 mg at 09/04/22 2127    hydrALAZINE (APRESOLINE) injection 10 mg, 10 mg, Intravenous, Q6H PRN, Alejandro Mitchell MD    hydrOXYzine HCL (ATARAX) tablet 25 mg, 25 mg, Oral, Q6H PRN, Alejandro Mitchell MD, 25 mg at 09/04/22 1026    iron sucrose (VENOFER) 50 mg in sodium chloride 0 9 % 100 mL IVPB, 50 mg, Intravenous, After Dialysis, Peter Benton MD    levothyroxine tablet 50 mcg, 50 mcg, Oral, Early Morning, Leonarda Pinto MD, 50 mcg at 09/05/22 0507    lidocaine (LIDODERM) 5 % patch 1 patch, 1 patch, Topical, Daily, Leonarda Pinto MD, 1 patch at 09/04/22 1033    loratadine (CLARITIN) tablet 5 mg, 5 mg, Oral, Daily, Leonarda Pinto MD, 5 mg at 09/04/22 1026    melatonin tablet 3 mg, 3 mg, Oral, HS, Leonarda Pinto MD, 3 mg at 09/04/22 2127    menthol-methyl salicylate (BENGAY) 35-81 % cream, , Apply externally, 4x Daily PRN, Leonarda Pinto MD, Given at 09/01/22 2205    methocarbamol (ROBAXIN) tablet 500 mg, 500 mg, Oral, Q6H PRN, Kiera Jordan PA-C, 500 mg at 09/04/22 1659    ondansetron (ZOFRAN-ODT) dispersible tablet 4 mg, 4 mg, Oral, Q6H PRN, LOIS Mcbride, 4 mg at 09/03/22 1400    oxyCODONE (ROXICODONE) IR tablet 2 5 mg, 2 5 mg, Oral, Q6H PRN, Kiera Jordan PA-C, 2 5 mg at 09/04/22 1659    pantoprazole (PROTONIX) EC tablet 40 mg, 40 mg, Oral, BID, Leonarda Pinto MD, 40 mg at 09/04/22 1701    polyethylene glycol (MIRALAX) packet 17 g, 17 g, Oral, Daily, Kiera Jordan PA-C, 17 g at 09/04/22 1026    pregabalin (LYRICA) capsule 50 mg, 50 mg, Oral, Daily, Leonarda Pinto MD, 50 mg at 09/04/22 1032    senna (SENOKOT) tablet 17 2 mg, 2 tablet, Oral, HS PRN, Leonarda Pinto MD, 17 2 mg at 09/03/22 2116    sertraline (ZOLOFT) tablet 75 mg, 75 mg, Oral, Daily, Lottie Oliveira MD, 75 mg at 09/04/22 1028    sevelamer (RENAGEL) tablet 1,600 mg, 1,600 mg, Oral, TID With Meals, Leonarda Pinto MD, 1,600 mg at 09/04/22 1658    warfarin (COUMADIN) tablet 9 mg, 9 mg, Oral, Daily (warfarin), Kiera Jordan PA-C, 9 mg at 09/04/22 1701    Invasive Devices:        Lab Results:   Results from last 7 days   Lab Units 09/04/22  1914 09/03/22  0927 09/01/22  0822 08/31/22  1022 08/30/22  1123   WBC Thousand/uL 4 25* 4 62  --   --  6 16   HEMOGLOBIN g/dL 7 6* 8 0*  --   --  8 3* HEMATOCRIT % 24 1* 25 4*  --   --  26 6*   PLATELETS Thousands/uL 139* 160  --   --  187   SODIUM mmol/L 135  --  137 135 136   POTASSIUM mmol/L 4 9  --  4 4 3 8 3 6   CHLORIDE mmol/L 100  --  101 100 99   CO2 mmol/L 33*  --  32 31 30   BUN mg/dL 32*  --  28* 19 14   CREATININE mg/dL 7 51*  --  8 05* 6 46* 4 35*   CALCIUM mg/dL 8 6  --  8 8 9 1 9 6   MAGNESIUM mg/dL  --   --   --  2 3  --    ALK PHOS U/L 105  --   --   --  102   ALT U/L 20  --   --   --  17   AST U/L 19  --   --   --  18       Previous work up:  See previous notes      Portions of the record may have been created with voice recognition software  Occasional wrong word or "sound a like" substitutions may have occurred due to the inherent limitations of voice recognition software  Read the chart carefully and recognize, using context, where substitutions have occurred  If you have any questions, please contact the dictating provider

## 2022-09-05 NOTE — ASSESSMENT & PLAN NOTE
· Continue daily miralax and colace  · Monitor stool output, last BM documented on 8/31  · Added one time lactulose had small bm today

## 2022-09-05 NOTE — ASSESSMENT & PLAN NOTE
Lab Results   Component Value Date    EGFR 5 09/04/2022    EGFR 5 09/01/2022    EGFR 6 08/31/2022    CREATININE 7 51 (H) 09/04/2022    CREATININE 8 05 (H) 09/01/2022    CREATININE 6 46 (H) 08/31/2022   · On dialysis TTS  · Nephrology consulted for HD management,  · Pt will need hemo coordinated with MRI imaging

## 2022-09-05 NOTE — PROGRESS NOTES
[address] [facility]  Progress Note Pérez Cook 1965, 56 y o  female MRN: [id number]  Unit/Bed#: CW2 210-01 Encounter: [id number]  Primary Care Provider: Clinton Hernandez MD   Date and time admitted to hospital: 8/30/2022 10:34 AM    * Lethargy  Assessment & Plan  · Patient was in her usual state of health, after finishing dialysis on day of admission when she had an episode of decreased level of consciousness  Patient states she was able to hear nurses calling her name but she could not answer  Per EMS no seizure-like activities and shortly she was back at her baseline mentation  She had a sharp right-sided back pain which she has been dealing with since last year  Per ED, it was not reported that she was hypotensive during this episode  · Unclear cause  ?  Transient hypotension which she had in the past during dialysis  Less likely seizure, no seizure-like activities reported per facility, and shortly she was back at her baseline mentation  Possibly pain related vs  Related to hypoglycemia   · Nonfocal neurologic exam  ·  this is waxing and waning - would consider MRI however now pending MRI lumbar spine pt with little ability to sustain position for longer period of time therefore   · CT head negative for acute pathology - consider MRI if ongoing concern  · Telemetry discontinued as pt taking leads off, no events noted   · Pt with persistent hypoglycemia here, endocrinology consulted, lantus discontinued, SSI coverage PRN with improvement and improvement in mentation as well   · Possible metabolic/toxic encephalopathy in setting of hypoglycemia, sedating medications treated with D50, adjusting medications and monitoring mental status   · Pt's significant other concerned regarding some forgetfulness/memory loss issues and tremors of the bilateral hands, reports parkinsons runs in the patient's family   Recommended  neurology/cognitive testing if ongoing  ·  ct head stable possible need for mri     Lumbar radiculopathy  Assessment & Plan  · Previous provider reviewed note from neuro surgery 08/04/2022  · Low back pain probably/likely facet joint related  Multiple admissions and ED visits for right-sided back pain that radiates to her legs, more on the right side  Prior MRI demonstrated moderately advanced degenerative lumbar spine disease at multi levels  L4-5 disc herniation more on the right with moderate central canal stenosis  MRI with questionable L4-L5 osteomyelitis/diskitis per neuro surgery and ID highly unlikely  Underwent bilateral L3, L4-L5 dorsal ramus block at [facility] in May 2022 with no improvement in symptoms  · Due to recent fall prior to her previous admission at the beginning of August CT of the right leg was ordered in the ED  No acute displaced fracture, mild osteoarthrosis  Possible cystitis   · Due to possible cystitis on CT scan, UA ordered and not yet obtained  Patient makes minimal urine  · Conservative measures with aqua k pad, bengay, Lyrica  · Continue decreased PRN oxycodone to 2 5 mg and PRN Robaxin, improvement in mentation noted  · NO IV MEDICATIONS PLEASE - DT restlessness has removed iv sites by accident   · PT/OT recommending STR which patient is in agreement with currently   · Pt reporting more pain in the right leg today, increase oxycodone to 2 5 mg Q6H, has not been utilizing her PRN oxycodone very often  · Monitor symptoms, Pt known to me  Upon entry to room pt is clearly uncomfortable   Hemo sessions are cut short as pt unable to tolerate due to pain ?    · Significant other reports that she is not taking pain meds or THC (he doesn't allow this in his home) He states pt was due for neurosx anahi this next week and has seen outpt acute pain   · Consult neurosx - appreciate discussion   · Plan for MRI w contrast Tuesday then hemo needs to be done post mri and again on Wednesday   · Evaluate any worsening or improvement from prior mri possible need for sx intervention     Elevated troponin  Assessment & Plan  · Suspect non MI troponin elevation secondary to hypertensive urgency on admission   · Also with underlying ESRD on HD  · Denies any chest pain palpitation or shortness of breath, EKG without acute ischemic changes    Constipation  Assessment & Plan  · Continue daily miralax and colace  · Monitor stool output, last BM documented on 8/31  · Added one time lactulose had small bm today     Acquired hypothyroidism  Assessment & Plan  · Continue levothyroxine 50 mcg daily    Anemia of chronic disease  Assessment & Plan  · Secondary to end-stage renal disease  · Hemoglobin at baseline at 8 0, unable to obtain labs this am nursing to reattempt     Esophageal reflux  Assessment & Plan  · Continue PPI    Anxiety disorder  Assessment & Plan  · Continue Xanax 0 25 mg b i d  p r n , hold for sedation   · Continue Zoloft 75 mg daily    ESRD on hemodialysis  Assessment & Plan  Lab Results   Component Value Date    EGFR 5 09/04/2022    EGFR 5 09/01/2022    EGFR 6 08/31/2022    CREATININE 7 51 (H) 09/04/2022    CREATININE 8 05 (H) 09/01/2022    CREATININE 6 46 (H) 08/31/2022   · On dialysis TTS  · Nephrology consulted for HD management,  · Pt will need hemo coordinated with MRI imaging     Essential hypertension  Assessment & Plan  · Hypertensive urgency noted on admission, likely in setting of missing dose of coreg on day of admit   · Overall much improved, BP stabilized   · Continue Coreg 25 mg BID  · Torsemide and nifedipine were discontinued on previous admission 8/15 due to hypotension  · Monitor         VTE Pharmacologic Prophylaxis: VTE Score: 3 High Risk (Score >/= 5) - Pharmacological DVT Prophylaxis Ordered: warfarin (Coumadin)  Sequential Compression Devices Ordered   hold tonight lower dose and resume 9/6 night however need lab work pt hard stick obtain on hemo     Patient Centered Rounds: I performed bedside rounds with nursing staff today  Discussions with Specialists or Other Care Team Provider: nursing     Education and Discussions with Family / Patient: Updated  (significant other) at bedside  Time Spent for Care: 60 minutes  More than 50% of total time spent on counseling and coordination of care as described above  Current Length of Stay: 5 day(s)  Current Patient Status: Inpatient   Certification Statement: The patient will continue to require additional inpatient hospital stay due to ongoing need for rehab and mri   Discharge Plan: Anticipate discharge in >72 hrs to rehab facility  Code Status: Level 1 - Full Code    Subjective:   Pt is emotional in pain   Rocking back and forth for in pain   She has no nausea but is labile with mood and seems depressed   She says her pain is so bad  Objective:     Vitals:   Temp (24hrs), Av 5 °F (36 9 °C), Min:98 3 °F (36 8 °C), Max:98 7 °F (37 1 °C)    Temp:  [98 3 °F (36 8 °C)-98 7 °F (37 1 °C)] 98 3 °F (36 8 °C)  HR:  [61-66] 61  Resp:  [18] 18  BP: (138-158)/(47-49) 138/47  SpO2:  [79 %-96 %] 79 %  There is no height or weight on file to calculate BMI  Input and Output Summary (last 24 hours): Intake/Output Summary (Last 24 hours) at 2022 1711  Last data filed at 2022 1601  Gross per 24 hour   Intake 360 ml   Output --   Net 360 ml       Physical Exam:   Physical Exam  Constitutional:       General: She is not in acute distress  Appearance: She is obese  She is not ill-appearing, toxic-appearing or diaphoretic  HENT:      Head: Normocephalic and atraumatic  Nose: No congestion  Eyes:      General:         Right eye: No discharge  Left eye: No discharge  Cardiovascular:      Rate and Rhythm: Normal rate  Heart sounds: Murmur heard  No friction rub  No gallop  Pulmonary:      Effort: No respiratory distress  Breath sounds: No stridor  No wheezing or rales  Chest:      Chest wall: No tenderness  Musculoskeletal:         General: Deformity (right chronic tma intact ) present  No swelling, tenderness or signs of injury  Right lower leg: No edema  Left lower leg: No edema  Skin:     Coloration: Skin is not jaundiced or pale  Findings: No bruising, erythema, lesion or rash  Neurological:      Mental Status: She is alert and oriented to person, place, and time  Psychiatric:      Comments: Tearful Marylu Ch           Additional Data:     Labs:  Results from last 7 days   Lab Units 09/04/22  1914   WBC Thousand/uL 4 25*   HEMOGLOBIN g/dL 7 6*   HEMATOCRIT % 24 1*   PLATELETS Thousands/uL 139*   NEUTROS PCT % 61   LYMPHS PCT % 21   MONOS PCT % 12   EOS PCT % 4     Results from last 7 days   Lab Units 09/04/22  1914   SODIUM mmol/L 135   POTASSIUM mmol/L 4 9   CHLORIDE mmol/L 100   CO2 mmol/L 33*   BUN mg/dL 32*   CREATININE mg/dL 7 51*   ANION GAP mmol/L 2*   CALCIUM mg/dL 8 6   ALBUMIN g/dL 2 7*   TOTAL BILIRUBIN mg/dL 0 75   ALK PHOS U/L 105   ALT U/L 20   AST U/L 19   GLUCOSE RANDOM mg/dL 190*     Results from last 7 days   Lab Units 09/04/22  1914   INR  3 02*     Results from last 7 days   Lab Units 09/05/22  1612 09/05/22  1134 09/05/22  0708 09/04/22  2103 09/04/22  1505 09/04/22  1107 09/04/22  0745 09/03/22  2138 09/03/22  1556 09/03/22  1334 09/03/22  0652 09/02/22  2051   POC GLUCOSE mg/dl 140 133 126 166* 138 160* 144* 159* 167* 163* 112 176*               Lines/Drains:  Invasive Devices  Report    Line  Duration           Hemodialysis AV Fistula 02/20/18 Left Forearm 1657 days                      Imaging: No pertinent imaging reviewed      Recent Cultures (last 7 days):         Last 24 Hours Medication List:   Current Facility-Administered Medications   Medication Dose Route Frequency Provider Last Rate    acetaminophen  650 mg Oral Q6H PRN Lisa Blum MD      acetaminophen  975 mg Oral Q8H Flores Ortiz MD      albuterol  2 puff Inhalation Q6H PRN Lottie MD Roxanne      ALPRAZolam  0 25 mg Oral BID PRN Joie Morrsion MD      atorvastatin  20 mg Oral Daily With Michael Castillo MD      b complex-vitamin C-folic acid  1 capsule Oral Daily With Michael Castillo MD      calcitriol  0 25 mcg Oral Once per day on Tue Thu Sat [facility] LOIS Isaac      carvedilol  25 mg Oral BID Joie Morrison MD      docusate sodium  100 mg Oral BID Zamzam Hunger, PA-C      gabapentin  100 mg Oral TID Joie Morrison MD      hydrALAZINE  10 mg Intravenous Q6H PRN Joie Morrison MD      hydrOXYzine HCL  25 mg Oral Q6H PRN Joie Morrison MD      iron sucrose  50 mg Intravenous After Dialysis MD Tommy Lima levothyroxine  50 mcg Oral Early Morning Joie Morrison MD      lidocaine  1 patch Topical Daily Joie Morrison MD      loratadine  5 mg Oral Daily Joie Morrison MD      melatonin  3 mg Oral HS Joie Morrison MD      menthol-methyl salicylate   Apply externally 4x Daily PRN Joie Morrison MD      methocarbamol  500 mg Oral Q6H PRN Zamzam Hunger, PA-C      ondansetron  4 mg Oral Q6H PRN LOIS Nair      oxyCODONE  2 5 mg Oral Q6H PRN Zamzam Hunger, PA-C      pantoprazole  40 mg Oral BID Joie Morrison MD      polyethylene glycol  17 g Oral Daily Maile Barr, PA-C      pregabalin  50 mg Oral Daily Joie Morrison MD      senna  2 tablet Oral HS PRN Joie Morrison MD      sertraline  75 mg Oral Daily Joie Morrison MD      sevelamer  1,600 mg Oral TID With Meals Joie Morrison MD      [START ON 9/6/2022] warfarin  5 mg Oral Daily (warfarin) LOIS Nair          Today, Patient Was Seen By: LOIS Nair    **Please Note: This note may have been constructed using a voice recognition system  **

## 2022-09-05 NOTE — ASSESSMENT & PLAN NOTE
· Previous provider reviewed note from neuro surgery 08/04/2022  · Low back pain probably/likely facet joint related  Multiple admissions and ED visits for right-sided back pain that radiates to her legs, more on the right side  Prior MRI demonstrated moderately advanced degenerative lumbar spine disease at multi levels  L4-5 disc herniation more on the right with moderate central canal stenosis  MRI with questionable L4-L5 osteomyelitis/diskitis per neuro surgery and ID highly unlikely  Underwent bilateral L3, L4-L5 dorsal ramus block at [facility] in May 2022 with no improvement in symptoms  · Due to recent fall prior to her previous admission at the beginning of August CT of the right leg was ordered in the ED  No acute displaced fracture, mild osteoarthrosis  Possible cystitis   · Due to possible cystitis on CT scan, UA ordered and not yet obtained  Patient makes minimal urine  · Conservative measures with aqua k pad, bengay, Lyrica  · Continue decreased PRN oxycodone to 2 5 mg and PRN Robaxin, improvement in mentation noted  · AVOID IV MEDICATIONS PLEASE - DT restlessness has removed iv sites by accident   · PT/OT recommending STR which patient is in agreement with currently   · Pt reporting more pain in the right leg today, increase oxycodone to 2 5 mg Q6H, has not been utilizing her PRN oxycodone very often  · Monitor symptoms, Pt known to me  Upon entry to room pt is clearly uncomfortable   Hemo sessions are cut short as pt unable to tolerate due to pain ?    · Significant other reports that she is not taking pain meds or THC (he doesn't allow this in his home) He states pt was due for neurosx anahi this next week and has seen outpt acute pain   · Consult neurosx - appreciate discussion   · Plan for MRI w contrast tomorrow then hemo needs to be done post mri and again on Thursday  · Evaluate any worsening or improvement from prior mri possible need for sx intervention   · Ativan will be given before MRI  · APS ordered to help w/ pain mgmt

## 2022-09-05 NOTE — PLAN OF CARE
Problem: PAIN - ADULT  Goal: Verbalizes/displays adequate comfort level or baseline comfort level  Description: Interventions:  - Encourage patient to monitor pain and request assistance  - Assess pain using appropriate pain scale  - Administer analgesics based on type and severity of pain and evaluate response  - Implement non-pharmacological measures as appropriate and evaluate response  - Consider cultural and social influences on pain and pain management  - Notify physician/advanced practitioner if interventions unsuccessful or patient reports new pain  Outcome: Progressing     Problem: INFECTION - ADULT  Goal: Absence or prevention of progression during hospitalization  Description: INTERVENTIONS:  - Assess and monitor for signs and symptoms of infection  - Monitor lab/diagnostic results  - Monitor all insertion sites, i e  indwelling lines, tubes, and drains  - Monitor endotracheal if appropriate and nasal secretions for changes in amount and color  - [place] appropriate cooling/warming therapies per order  - Administer medications as ordered  - Instruct and encourage patient and family to use good hand hygiene technique  - Identify and instruct in appropriate isolation precautions for identified infection/condition  Outcome: Progressing  Goal: Absence of fever/infection during neutropenic period  Description: INTERVENTIONS:  - Monitor WBC    Outcome: Progressing     Problem: DISCHARGE PLANNING  Goal: Discharge to home or other facility with appropriate resources  Description: INTERVENTIONS:  - Identify barriers to discharge w/patient and caregiver  - Arrange for needed discharge resources and transportation as appropriate  - Identify discharge learning needs (meds, wound care, etc )  - Arrange for interpretive services to assist at discharge as needed  - Refer to Case Management Department for coordinating discharge planning if the patient needs post-hospital services based on physician/advanced practitioner order or complex needs related to functional status, cognitive ability, or social support system  Outcome: Progressing     Problem: Potential for Falls  Goal: Patient will remain free of falls  Description: INTERVENTIONS:  - Educate patient/family on patient safety including physical limitations  - Instruct patient to call for assistance with activity   - Consult OT/PT to assist with strengthening/mobility   - Keep Call bell within reach  - Keep bed low and locked with side rails adjusted as appropriate  - Keep care items and personal belongings within reach  - Initiate and maintain comfort rounds  - Make Fall Risk Sign visible to staff  - Offer Toileting every 2 Hours, in advance of need  - Initiate/Maintain bed alarm  - Obtain necessary fall risk management equipment:   - Apply yellow socks and bracelet for high fall risk patients  - Consider moving patient to room near nurses station  Outcome: Progressing     Problem: Prexisting or High Potential for Compromised Skin Integrity  Goal: Skin integrity is maintained or improved  Description: INTERVENTIONS:  - Identify patients at risk for skin breakdown  - Assess and monitor skin integrity  - Assess and monitor nutrition and hydration status  - Monitor labs   - Assess for incontinence   - Turn and reposition patient  - Assist with mobility/ambulation  - Relieve pressure over bony prominences  - Avoid friction and shearing  - Provide appropriate hygiene as needed including keeping skin clean and dry  - Evaluate need for skin moisturizer/barrier cream  - Collaborate with interdisciplinary team   - Patient/family teaching  - Consider wound care consult   Outcome: Progressing     Problem: METABOLIC, FLUID AND ELECTROLYTES - ADULT  Goal: Electrolytes maintained within normal limits  Description: INTERVENTIONS:  - Monitor labs and assess patient for signs and symptoms of electrolyte imbalances  - Administer electrolyte replacement as ordered  - Monitor response to electrolyte replacements, including repeat lab results as appropriate  - Instruct patient on fluid and nutrition as appropriate  Outcome: Progressing  Goal: Fluid balance maintained  Description: INTERVENTIONS:  - Monitor labs   - Monitor I/O and WT  - Instruct patient on fluid and nutrition as appropriate  - Assess for signs & symptoms of volume excess or deficit  Outcome: Progressing

## 2022-09-05 NOTE — ASSESSMENT & PLAN NOTE
Lab Results   Component Value Date    HGBA1C 9 4 (H) 07/09/2022       Recent Labs     09/04/22  1505 09/04/22  2103 09/05/22  0708 09/05/22  1134   POCGLU 138 166* 126 133       Blood Sugar Average: Last 72 hrs:  (P) 147 [id number]   · On Lantus 12 units HS and NovoLog sliding scale at home   · Noted to have persistent hypoglycemia while hospitalized   · Lantus discontinued, continue PRN SSi coverage for now  · Endocrinology following, appreciate recommendations  · Encourage PO intake   · QID glucose checks  · Received 2 amps D50 8/31  · Diabetic diet  · Could be contributing to patient's overall presentation

## 2022-09-05 NOTE — CASE MANAGEMENT
Case Management Discharge Planning Note    Patient name Whit Novoa  Location 2 210/CW2 451-47 MRN [id number]  : 1965 Date 2022       Current Admission Date: 2022  Current Admission Diagnosis:Lethargy   Patient Active Problem List    Diagnosis Date Noted    Lethargy 2022    Elevated troponin 2022    Intractable back pain 2022    Problem with vascular access 2022    Concern for Osteomyelitis/Discitis of lumbar region 2022    Fall 2022    Hypoxia 2022    COVID-19 2022    Right knee pain 2022    Constipation 2022    Pruritus 2021    Postop check 2021    Sigmoid diverticulitis 2021    Pneumonia 2021    Chronic anticoagulation 2021    Arteriovenous fistula for hemodialysis in place, primary ([facility] ) 2021    Healthcare-associated pneumonia 2021    Right foot pain 2021    A-V fistula ([facility] ) 2021    Chronic pain syndrome 2021    Bilateral primary osteoarthritis of knee 2021    Bilateral patellofemoral syndrome 2021    Tinea unguium 2020    S/P foot surgery, right 2020    History of claustrophobia 2020    Morbid obesity 2020    Hyperlipidemia 2020    Diabetic poly[facility]uropathy associated with type [facility] mellitus [facility] ) 2020    Encounter for diabetic foot exam ([facility] ) 2020    Corns and callosities 2020    History of transmetatarsal amputation of right foot ([facility] ) 2020    Flu-like symptoms 2020    Lumbar radiculopathy 2020    Low back pain with sciatica 2020    Hemodialysis-associated hypotension 2019    Hyponatremia 2019    Parenchymal renal hypertension 2019    Candidiasis of breast 2019    Hyperkalemia 2019    Anemia of chronic disease 2019    Disruption of internal surgical wound 2019    Dyspnea 2019    Secondary hyperparathyroidism of renal origin ([facility] ) 04/27/2019    Nausea and vomiting 04/26/2019    Meningioma ([place] [address] ) 04/18/2019    Concussion without loss of consciousness 03/15/2019    Colon cancer screening 03/05/2019    Gastroesophageal reflux disease 03/05/2019    Primary osteoarthritis of right knee 10/25/2018    Hemodialysis status ([place] [address] ) 10/23/2018    Knee pain 10/22/2018    Anxiety disorder 04/06/2017    Acquired hypothyroidism 07/22/2016    Glaucoma 07/01/2016    ESRD on hemodialysis 07/01/2016    Abnormal uterine bleeding 02/15/2016    History of venous thromboembolism 02/14/2016    Pulmonary embolus ([place] [address] ) 10/30/2015    Cervical dysplasia 05/03/2015    Chronic endometritis 10/17/2014    Tinea pedis 10/02/2014    Benign neoplasm of skin 09/25/2014    Type 2 diabetes mellitus ([place] [address] ) 09/04/2014    Phlebitis and thrombophlebitis of superficial vessels of lower extremities 04/10/2014    Limb pain 11/25/2013    Mononeuritis of upper limb, unspecified laterality 11/25/2013    Legal blindness, as defined in [place] [facility] 59/79/2189    Complication from renal dialysis device 04/22/2013    Essential hypertension 10/15/2012    Cardiomyopathy ([place] [address] ) 09/26/2012    Esophageal reflux 08/20/2012    Allergic rhinitis 08/20/2012      LOS (days): 5  Geometric Mean LOS (GMLOS) (days): 3 80  Days to GMLOS:-1 2     OBJECTIVE:  Risk of Unplanned Readmission Score: 62 71         Current admission status: Inpatient   Preferred Pharmacy:   [facility]/pharmacy #[id number] [place] - [address]  [address] [place] [address]  Phone: [phone] Fax: [phone] [facility], [address] [place] [address]  Phone: [phone] Fax: [phone]    Primary Care Provider: Monica Pinedo MD    Primary Insurance: MEDICARE  Secondary Insurance: [facility]    DISCHARGE DETAILS:    Other Referral/Resources/Interventions Provided:  Interventions: Short Term Rehab  Referral Comments: Per communication with Krista Martinez, pt is not yet medically ready for d/c today  CM informed pt may be tentatively cleared for d/c Wednesday 9/7  Pt accepted at [place] [address] for STR  CM provided updated to [place] via [address] regarding DCP  CM informed [place] will have a bed available Wednesday for patient pending, negative covid swab and covid booster if able  Update on same provided to [facility]  CM team will continue to follow for final medical clearance      Treatment Team Recommendation: Short Term Rehab  Discharge Destination Plan[de-identified] [facility]

## 2022-09-06 ENCOUNTER — TELEPHONE (OUTPATIENT)
Dept: RADIOLOGY | Facility: HOSPITAL | Age: 57
End: 2022-09-06

## 2022-09-06 ENCOUNTER — APPOINTMENT (INPATIENT)
Dept: DIALYSIS | Facility: HOSPITAL | Age: 57
DRG: 070 | End: 2022-09-06
Payer: MEDICARE

## 2022-09-06 ENCOUNTER — TELEPHONE (OUTPATIENT)
Dept: NEUROSURGERY | Facility: CLINIC | Age: 57
End: 2022-09-06

## 2022-09-06 PROBLEM — G92.8 TOXIC METABOLIC ENCEPHALOPATHY: Status: ACTIVE | Noted: 2022-08-30

## 2022-09-06 PROBLEM — G93.41 METABOLIC ENCEPHALOPATHY: Status: ACTIVE | Noted: 2022-08-30

## 2022-09-06 LAB
GLUCOSE SERPL-MCNC: 116 MG/DL (ref 65–140)
GLUCOSE SERPL-MCNC: 126 MG/DL (ref 65–140)
GLUCOSE SERPL-MCNC: 149 MG/DL (ref 65–140)
GLUCOSE SERPL-MCNC: 207 MG/DL (ref 65–140)
INR PPP: 3.31 (ref 0.84–1.19)
PROTHROMBIN TIME: 33.9 SECONDS (ref 11.6–14.5)

## 2022-09-06 PROCEDURE — 99222 1ST HOSP IP/OBS MODERATE 55: CPT | Performed by: NURSE PRACTITIONER

## 2022-09-06 PROCEDURE — 90935 HEMODIALYSIS ONE EVALUATION: CPT | Performed by: INTERNAL MEDICINE

## 2022-09-06 PROCEDURE — 82948 REAGENT STRIP/BLOOD GLUCOSE: CPT

## 2022-09-06 PROCEDURE — 85610 PROTHROMBIN TIME: CPT | Performed by: NURSE PRACTITIONER

## 2022-09-06 PROCEDURE — 99232 SBSQ HOSP IP/OBS MODERATE 35: CPT | Performed by: GENERAL PRACTICE

## 2022-09-06 RX ORDER — NYSTATIN 100000 [USP'U]/G
POWDER TOPICAL 2 TIMES DAILY
Status: DISCONTINUED | OUTPATIENT
Start: 2022-09-06 | End: 2022-09-07 | Stop reason: HOSPADM

## 2022-09-06 RX ORDER — LORAZEPAM 2 MG/ML
0.5 INJECTION INTRAMUSCULAR ONCE
Status: COMPLETED | OUTPATIENT
Start: 2022-09-06 | End: 2022-09-06

## 2022-09-06 RX ORDER — LORAZEPAM 2 MG/ML
1 INJECTION INTRAMUSCULAR
Status: DISCONTINUED | OUTPATIENT
Start: 2022-09-07 | End: 2022-09-07 | Stop reason: HOSPADM

## 2022-09-06 RX ORDER — LIDOCAINE 50 MG/G
2 PATCH TOPICAL DAILY
Status: DISCONTINUED | OUTPATIENT
Start: 2022-09-06 | End: 2022-09-07 | Stop reason: HOSPADM

## 2022-09-06 RX ORDER — WATER 1000 ML/1000ML
INJECTION, SOLUTION INTRAVENOUS
Status: DISPENSED
Start: 2022-09-06 | End: 2022-09-06

## 2022-09-06 RX ORDER — OLANZAPINE 10 MG/1
2.5 INJECTION, POWDER, LYOPHILIZED, FOR SOLUTION INTRAMUSCULAR ONCE
Status: DISCONTINUED | OUTPATIENT
Start: 2022-09-06 | End: 2022-09-07 | Stop reason: HOSPADM

## 2022-09-06 RX ORDER — HYDRALAZINE HYDROCHLORIDE 20 MG/ML
10 INJECTION INTRAMUSCULAR; INTRAVENOUS EVERY 6 HOURS PRN
Status: DISCONTINUED | OUTPATIENT
Start: 2022-09-06 | End: 2022-09-07 | Stop reason: HOSPADM

## 2022-09-06 RX ORDER — AMLODIPINE BESYLATE 5 MG/1
5 TABLET ORAL DAILY
Status: DISCONTINUED | OUTPATIENT
Start: 2022-09-06 | End: 2022-09-07 | Stop reason: HOSPADM

## 2022-09-06 RX ORDER — HYDRALAZINE HYDROCHLORIDE 20 MG/ML
10 INJECTION INTRAMUSCULAR; INTRAVENOUS EVERY 6 HOURS PRN
Status: DISCONTINUED | OUTPATIENT
Start: 2022-09-06 | End: 2022-09-06

## 2022-09-06 RX ADMIN — CARVEDILOL 25 MG: 25 TABLET, FILM COATED ORAL at 12:37

## 2022-09-06 RX ADMIN — DOCUSATE SODIUM 100 MG: 100 CAPSULE, LIQUID FILLED ORAL at 18:10

## 2022-09-06 RX ADMIN — LIDOCAINE 5% 1 PATCH: 700 PATCH TOPICAL at 08:10

## 2022-09-06 RX ADMIN — PANTOPRAZOLE SODIUM 40 MG: 40 TABLET, DELAYED RELEASE ORAL at 08:11

## 2022-09-06 RX ADMIN — GABAPENTIN 100 MG: 100 CAPSULE ORAL at 08:11

## 2022-09-06 RX ADMIN — LORATADINE 5 MG: 10 TABLET ORAL at 08:11

## 2022-09-06 RX ADMIN — PREGABALIN 50 MG: 50 CAPSULE ORAL at 08:11

## 2022-09-06 RX ADMIN — POLYETHYLENE GLYCOL 3350 17 G: 17 POWDER, FOR SOLUTION ORAL at 08:12

## 2022-09-06 RX ADMIN — SEVELAMER HYDROCHLORIDE 1600 MG: 800 TABLET ORAL at 18:11

## 2022-09-06 RX ADMIN — AMLODIPINE BESYLATE 5 MG: 5 TABLET ORAL at 12:37

## 2022-09-06 RX ADMIN — SERTRALINE HYDROCHLORIDE 75 MG: 50 TABLET ORAL at 08:11

## 2022-09-06 RX ADMIN — DOCUSATE SODIUM 100 MG: 100 CAPSULE, LIQUID FILLED ORAL at 08:11

## 2022-09-06 RX ADMIN — PANTOPRAZOLE SODIUM 40 MG: 40 TABLET, DELAYED RELEASE ORAL at 18:10

## 2022-09-06 RX ADMIN — SEVELAMER HYDROCHLORIDE 1600 MG: 800 TABLET ORAL at 12:40

## 2022-09-06 RX ADMIN — ACETAMINOPHEN 975 MG: 325 TABLET ORAL at 05:02

## 2022-09-06 RX ADMIN — CALCITRIOL CAPSULES 0.25 MCG 0.25 MCG: 0.25 CAPSULE ORAL at 08:11

## 2022-09-06 RX ADMIN — ACETAMINOPHEN 975 MG: 325 TABLET ORAL at 13:52

## 2022-09-06 RX ADMIN — CARVEDILOL 25 MG: 25 TABLET, FILM COATED ORAL at 18:10

## 2022-09-06 RX ADMIN — ACETAMINOPHEN 975 MG: 325 TABLET ORAL at 21:41

## 2022-09-06 RX ADMIN — Medication 1 CAPSULE: at 18:10

## 2022-09-06 RX ADMIN — LEVOTHYROXINE SODIUM 50 MCG: 50 TABLET ORAL at 05:02

## 2022-09-06 RX ADMIN — SEVELAMER HYDROCHLORIDE 1600 MG: 800 TABLET ORAL at 08:12

## 2022-09-06 RX ADMIN — OXYCODONE HYDROCHLORIDE 2.5 MG: 5 TABLET ORAL at 05:07

## 2022-09-06 RX ADMIN — LORAZEPAM 0.5 MG: 2 INJECTION INTRAMUSCULAR; INTRAVENOUS at 11:55

## 2022-09-06 RX ADMIN — ATORVASTATIN CALCIUM 20 MG: 20 TABLET, FILM COATED ORAL at 18:09

## 2022-09-06 NOTE — HEMODIALYSIS
Post-Dialysis RN Treatment Note   Blood Pressure:  Pre 218/90 mm/Hg  Post 117/83 mmHg   EDW:   124 kg    Weight:  Pre 122 kg   Post:   none   Mode of weight measurement:    Bed scale   Volume Removed:   1963 ml    Treatment duration:   180 minutes    NS given:   none   Treatment shortened:   Yes  Treatment terminated with 30 minutes remaining per Dr Roger Benton  Patient uncooperative and screaming  Medications given during Rx:      Estimated Kt/V:   0 91   Access type:   LFA fistula   Access Issues:   Maintains 400 bfr    Report called to primary nurse:  Verbal at bedside to [employer]

## 2022-09-06 NOTE — ASSESSMENT & PLAN NOTE
· History of DVT and PE 6 years ago  · On Coumadin, subtherapeutic on admission, questionable compliance, does not seem to know which medications she is taking at home   · Maintained on Coumadin 9 mg daily on PTA list   · INR 3 3 today so hold coumadin    Likely needs about 7 5 mg daily

## 2022-09-06 NOTE — OCCUPATIONAL THERAPY NOTE
Occupational Therapy Cancel Note       09/06/22 0915   OT Last Visit   OT Visit Date 09/06/22   Note Type   Note Type Cancelled Session   Cancel Reasons Patient off floor/hemodialysis       SIMI Jo

## 2022-09-06 NOTE — PROGRESS NOTES
NEPHROLOGY PROGRESS NOTE   Raine Babb 56 y o  female MRN: [id number]  Unit/Bed#: CW2 210-01 Encounter: [id number]  Reason for Consult: ESRD    ASSESSMENT AND PLAN:  ESRD on HD on TTS schedule [address]  -HD today  -MRI has been ordered but no confirmed schedule when discussed with floor nurse and MRI department  Will reach out to neurosurgery as this needs to be coordinated to have dialysis after MRI with gadolinium if this is being planned  -outpatient dry weight 124 kg  -remains below dry weight 118 2 kg today  UF removal 1 to 2 L as BP tolerated    I saw and examined patient during hemodialysis treatment at 9:38 AM on 9/6/2022  The patient was receiving hemodialysis for treatment of end stage renal disease  I also reviewed vital signs, intake and output, lab results and recent events, and agree with dialysis order  Tolerating HD  BP acceptable    Altered mental status, further management as per primary team    CKD anemia, not on Epogen due to history of PE  -transfuse p r n  for hemoglobin less than seven    Hypertension  -BP remains above goal  -currently on Coreg 25 mg p o  B i d   -will start amlodipine 5 mg daily  -suspect pain contributing as well    CKD BMD, continue to monitor with renal diet, binders    Access, upper extremity AV access with bruit and thrill present    Lumbar radiculopathy, continue to have right-sided lower back pain with leg pain   -discussed with neurosurgery, plan noted for MRI with and without gadolinium  Again this needs to be coordinated with MRI department and with dialysis department as patient will need dialysis for two consecutive days after MRI with gadolinium  Discussed above plan in detail with neurosurgery team     SUBJECTIVE:  Patient seen and examined at bedside  Denies chest pain  She is still complaining of leg pain, lower back pain      OBJECTIVE:  Current Weight:    Vitals:    09/06/22 0900   BP: (!) 192/94   Pulse: 60   Resp: 18   Temp:    SpO2: Intake/Output Summary (Last 24 hours) at 9/6/2022 0931  Last data filed at 9/6/2022 0830  Gross per 24 hour   Intake 440 ml   Output --   Net 440 ml     Wt Readings from Last 3 Encounters:   08/12/22 127 kg (280 lb)   07/29/22 130 kg (286 lb 9 6 oz)   07/09/22 130 kg (285 lb 11 5 oz)     Temp Readings from Last 3 Encounters:   09/06/22 97 9 °F (36 6 °C) (Oral)   08/23/22 (!) 95 5 °F (35 3 °C)   08/21/22 98 °F (36 7 °C)     BP Readings from Last 3 Encounters:   09/06/22 (!) 192/94   08/29/22 150/80   08/23/22 142/88     Pulse Readings from Last 3 Encounters:   09/06/22 60   08/23/22 88   08/20/22 75        Physical Examination:  General:  Lying in bed, no acute distress   Eyes:  Mild conjunctival pallor present  ENT:  External examination of ears and nose unremarkable  Neck:  No obvious lymphadenopathy appreciated  Respiratory:  Decreased breath sound at bases  CVS:  S1, S2 present  GI: , nondistended  CNS:  Active, alert  Skin:  No new rash  Musculoskeletal:  No obvious new gross deformity noted    Medications:    Current Facility-Administered Medications:     acetaminophen (TYLENOL) tablet 650 mg, 650 mg, Oral, Q6H PRN, Tia Mejias MD, 650 mg at 08/30/22 2303    acetaminophen (TYLENOL) tablet 975 mg, 975 mg, Oral, Q8H [address], Lottie Oliveira MD, 975 mg at 09/06/22 0502    albuterol (PROVENTIL HFA,VENTOLIN HFA) inhaler 2 puff, 2 puff, Inhalation, Q6H PRN, Tia Mejias MD    ALPRAZolam Ardelle Fritter) tablet 0 25 mg, 0 25 mg, Oral, BID PRN, Tia Mejias MD, 0 25 mg at 09/02/22 2234    atorvastatin (LIPITOR) tablet 20 mg, 20 mg, Oral, Daily With Dinner, Tia Mejias MD, 20 mg at 09/05/22 1619    b complex-vitamin C-folic acid (NEPHROCAPS) capsule 1 capsule, 1 capsule, Oral, Daily With Dannial Proper, MD, 1 capsule at 09/05/22 1618    calcitriol (ROCALTROL) capsule 0 25 mcg, 0 25 mcg, Oral, Once per day on Tue Thu Sat, LOIS Harper, 0 25 mcg at 09/06/22 1953   carvedilol (COREG) tablet 25 mg, 25 mg, Oral, BID, Lottie Oliveira MD, 25 mg at 09/05/22 1733    docusate sodium (COLACE) capsule 100 mg, 100 mg, Oral, BID, Gaye Hernandez PA-C, 100 mg at 09/06/22 1800    gabapentin (NEURONTIN) capsule 100 mg, 100 mg, Oral, TID, Miguel Swartz MD, 100 mg at 09/06/22 0811    hydrALAZINE (APRESOLINE) injection 10 mg, 10 mg, Intravenous, Q6H PRN, Miguel Swartz MD    hydrOXYzine HCL (ATARAX) tablet 25 mg, 25 mg, Oral, Q6H PRN, Miguel Swartz MD, 25 mg at 09/04/22 1026    iron sucrose (VENOFER) 50 mg in sodium chloride 0 9 % 100 mL IVPB, 50 mg, Intravenous, After Dialysis, Zoran Dee MD    levothyroxine tablet 50 mcg, 50 mcg, Oral, Early Morning, Lottie Oliveira MD, 50 mcg at 09/06/22 0502    lidocaine (LIDODERM) 5 % patch 1 patch, 1 patch, Topical, Daily, Miguel Swartz MD, 1 patch at 09/06/22 0810    loratadine (CLARITIN) tablet 5 mg, 5 mg, Oral, Daily, Lottie Oliveira MD, 5 mg at 09/06/22 0811    melatonin tablet 3 mg, 3 mg, Oral, HS, Lottie Oliveira MD, 3 mg at 09/05/22 2117    menthol-methyl salicylate (BENGAY) 82-81 % cream, , Apply externally, 4x Daily PRN, Miguel Swartz MD, Given at 09/01/22 2205    methocarbamol (ROBAXIN) tablet 500 mg, 500 mg, Oral, Q6H PRN, Gaye Hernandez PA-C, 500 mg at 09/05/22 1338    ondansetron (ZOFRAN-ODT) dispersible tablet 4 mg, 4 mg, Oral, Q6H PRN, Lynnda Crooked, CRNP, 4 mg at 09/03/22 1400    oxyCODONE (ROXICODONE) IR tablet 2 5 mg, 2 5 mg, Oral, Q6H PRN, Gaye Rape, PA-JASON, 2 5 mg at 09/06/22 0507    pantoprazole (PROTONIX) EC tablet 40 mg, 40 mg, Oral, BID, Miguel Swartz MD, 40 mg at 09/06/22 0811    polyethylene glycol (MIRALAX) packet 17 g, 17 g, Oral, Daily, Gaye Hernandez PA-C, 17 g at 09/06/22 8829    pregabalin (LYRICA) capsule 50 mg, 50 mg, Oral, Daily, Lottie Oliveira MD, 50 mg at 09/06/22 0811    senna (SENOKOT) tablet 17 2 mg, 2 tablet, Oral, HS PRN, Miguel Swartz MD, 17 2 mg at 09/03/22 2116    sertraline (ZOLOFT) tablet 75 mg, 75 mg, Oral, Daily, Tia Mejias MD, 75 mg at 09/06/22 7328    sevelamer (RENAGEL) tablet 1,600 mg, 1,600 mg, Oral, TID With Meals, Tia Mejias MD, 1,600 mg at 09/06/22 5144    warfarin (COUMADIN) tablet 5 mg, 5 mg, Oral, Daily (warfarin), LOIS Brannon    Laboratory Results:  Results from last 7 days   Lab Units 09/04/22  1914 09/03/22  0927 09/01/22  0822 08/31/22  1022 08/30/22  1123   WBC Thousand/uL 4 25* 4 62  --   --  6 16   HEMOGLOBIN g/dL 7 6* 8 0*  --   --  8 3*   HEMATOCRIT % 24 1* 25 4*  --   --  26 6*   PLATELETS Thousands/uL 139* 160  --   --  187   SODIUM mmol/L 135  --  137 135 136   POTASSIUM mmol/L 4 9  --  4 4 3 8 3 6   CHLORIDE mmol/L 100  --  101 100 99   CO2 mmol/L 33*  --  32 31 30   BUN mg/dL 32*  --  28* 19 14   CREATININE mg/dL 7 51*  --  8 05* 6 46* 4 35*   CALCIUM mg/dL 8 6  --  8 8 9 1 9 6   MAGNESIUM mg/dL  --   --   --  2 3  --        CT head without contrast   Final Result by Madeline Valdez DO (08/30 1311)   No acute intracranial abnormality  Workstation performed: [id number]         CT lower extremity wo contrast right   Final Result by Poornima Michelle MD (08/30 1316)      No acute displaced fracture  Mild osteoarthrosis  Possible cystitis  Workstation performed: [id number]         MRI inpatient order    (Results Pending)       Portions of the record may have been created with voice recognition software  Occasional wrong word or "sound a like" substitutions may have occurred due to the inherent limitations of voice recognition software  Read the chart carefully and recognize, using context, where substitutions have occurred

## 2022-09-06 NOTE — CONSULTS
Consultation - Geriatrics   Navneet Real 56 y o  female MRN: [id number]  Unit/Bed#: 2 210-01 Encounter: [id number]      Assessment/Plan  Encephalopathy  Alert and oriented x 3   Worsening confusion post dialysis  Cannot exclude medications: lyrica, robaxin, atarax, gabapentin    Recommend discontinuation of gabapentin not prescribed with prior admission, pt reported previously gabapentin was not effective     Maintain delirium precautions:  Provide frequent redirection, reorientation, distraction techniques  Avoid deliriogenic medications such as tramadol, benzodiazepines, anticholinergics,  Benadryl  Treat pain, See geriatric pain protocol  Monitor for constipation and urinary retention  Encourage early and frequent moblization, OOB  Encourage Hydration/ Nutrition  Implement sleep hygiene, limit night time interuptions, group activities      Ambulatory dysfunction  At risk secondary to hx of prior falls, polypharmacy  Fall precautions  PT/OT  Rehab post hospitalization    Anxiety  Chronic  On zoloft   Xanax last refill June 2022  Continue zoloft, agree with prn xanax to taper    Chronic pain  To lower back  MRI pending  Follows with pain management as outpatient, limited use of medications due to ESRD on HD    Advance Care Planning  Full code    Home medication review    [facility], spoke with pharmacists to confirm  Acetaminophen 325 mg   proAir 90 mcg po daily  Xanax 0 5 mg po BID prn  lipitor 20 mg po daily  bentyl 10 mg po daily  zofran 8 mg po prn  Robaxin 500 mg po Q 6  Renagel 1 capsule daily  Sertraline 75 mg po daily  lyrica 50 mg po daily 1 additional on dialysis days  loratadine 10 mg po daily  Synthroid 50 mcg po daily  Warfarin 3 mg po as directed  pantoprazole 40 mg po BID  Gabapentin 100 mg po TID  Coreg 25 mg po BID  Lantus 30 units Sc QHS  Novolog flex pen 3 x day sliding scale  Torsemide 100 mg po daily (discontinued on last AVS, per [facility] pharmacy current med)    08/21/2022  2 08/21/2022  Oxycodone Hcl (Ir) 5 MG Tablet    10 00  5 Abbey Lamar   [id number]   Pen (5985)    15 00 MME  Medicare   [place]   08/04/2022  2   08/04/2022  Oxycodone-Acetaminophen 5-325    20 00  5 [place]   [id number]   Pen (2640)    30 00 MME  Medicare   [place]   07/25/2022  2   07/25/2022  Pregabalin 50 MG Capsule    45 00  30 Vani Emmanuel   [id number]   Pen (5307)     Medicare   [place]   06/15/2022  2   06/15/2022  Pregabalin 50 MG Capsule    45 00  30 [place]   [id number]   Pen (6840)     Medicare   [place]   06/07/2022  2   06/07/2022  Alprazolam 0 5 MG Tablet    75 00  30 [place]   [id number]   Pen (2092)     Medicare   [place]   04/15/2022  2   04/15/2022  Pregabalin 50 MG Capsule    45 00  30 [place]   [id number]   Pen (8680)     [place]icare [place]         History of Present Illness   Physician Requesting Consult: Max Adam, DO  Reason for Consult / Principal Problem: confusion post dialysis  Hx and PE limited by: NA  HPI: Cam Meeks is a 56y o  year old female who presents with altered mental status during dialysis  She was minimally responsive to staff  She has hx of multiple admissions for right sided back pain that radiates to her right leg  Hx of L4-5 disc herniation with moderate central canal stenosis  She has DM, ESRD on HD, HTN, CM, Chronic pain syndrome, hx of DVT and PE on coumadin, Right diabetic ulcer/osteomyelitis: s/p right transmetatarsal right foot     Prior to arrival pt lives at home with her son  She needs assistance with IADLs and ADLS  She ambulates with a cane/walker  She has prior falls  She reports that her boyfriend helps her at home  Upon exam pt is lying in bed  She is alert and oriented x 3, no signs of delirium noted  Last [facility] 9/5/22    Inpatient consult to Gerontology  Consult performed by: LOIS Parrish  Consult ordered by: Max Adam, DO          Review of Systems   Constitutional: Negative for unexpected weight change  HENT: Negative for hearing loss  Eyes: Negative for visual disturbance  Respiratory: Negative for cough  Cardiovascular: Negative for chest pain  Gastrointestinal: Negative for constipation  Genitourinary: Negative for difficulty urinating  Musculoskeletal: Negative for gait problem  Neurological: Negative for dizziness  Psychiatric/Behavioral: Negative for sleep disturbance         Historical Information   Past Medical History:   Diagnosis Date    Abnormal uterine bleeding (AUB)     Anemia     Anxiety     Arthritis     Chronic kidney disease     Claustrophobia     Diabetes mellitus ([facility] )     Disease of thyroid gland     DVT (deep venous thrombosis) ([facility])     End stage kidney disease (HCC)     Foot ulcer due to secondary DM ([place] [address] )     Hemodialysis patient ([place] [address] )     Tues, Thurs, Sat    Hypertension     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[place] [address] )     right eye    Morbid obesity ([place] [address] )     Osteomyelitis of fifth toe of right foot ([facility])     Panic attacks     Pulmonary embolism ([facility])     Reflux esophagitis     Thrombophlebitis of saphenous vein     Warfarin anticoagulation      Past Surgical History:   Procedure Laterality Date    AMPUTATION      r 4th toe    ARTERIOVENOUS GRAFT PLACEMENT      CATARACT EXTRACTION Bilateral     CERVICAL BIOPSY  W/ LOOP ELECTRODE EXCISION       SECTION      DIALYSIS FISTULA CREATION      DILATION AND CURETTAGE OF UTERUS      ENDOMETRIAL ABLATION W/ NOVASURE      EYE SURGERY      HYSTERECTOMY      @ age 57 (complete)    IR AV FISTULAGRAM/GRAFTOGRAM  10/11/2019    IR AV FISTULAGRAM/GRAFTOGRAM  2020    IR AV FISTULAGRAM/GRAFTOGRAM  2020    IR NON-TUNNELED CENTRAL LINE PLACEMENT  2021    IR NON-TUNNELED CENTRAL LINE PLACEMENT  10/4/2021    OOPHORECTOMY Bilateral     @ age 57    PERICARDIUM SURGERY      IA AMPUTATION FOOT,TRANSMETATARSAL Right 2021    Procedure: AMPUTATION TRANSMETATARSAL (TMA);  Surgeon: Chavo Foster DPM;  [facility];  Service: Podiatry    IA AMPUTATION TOE,MT-P JT Right 5/28/2020    Procedure: AMPUTATION TOE- 5th;  Surgeon: Erni Carlson DPM;  [facility];  Service: Podiatry    OR COLONOSCOPY FLX DX W/COLLJ [address] PFRMD N/A 3/13/2019    Procedure: COLONOSCOPY;  Surgeon: Wolf Cuellar MD;  Location: [facility]; Service: Gastroenterology    OR ESOPHAGOGASTRODUODENOSCOPY TRANSORAL DIAGNOSTIC N/A 3/13/2019    Procedure: ESOPHAGOGASTRODUODENOSCOPY (EGD); Surgeon: Wolf Cuellar MD;  [facility]: [facility];   Service: Gastroenterology    OR LAPAROSCOPY W TOT HYSTERECT UTERUS 250 GRAM OR LESS N/A 2/16/2016    Procedure: HYSTERECTOMY LAPAROSCOPIC TOTAL ([address]), with bilateral salpingectomy;  Surgeon: Brad Nolan DO;  [facility];  Service: Gynecology    THROMBECTOMY / ARTERIOVENOUS GRAFT REVISION      TOE SURGERY Right     removal of the 4th toe    WOUND DEBRIDEMENT Right 10/8/2021    Procedure: R 1st MTPJ washout ;  Surgeon: Sophie John DPM;  [facility];  Service: Podiatry     Social History   Social History     Substance and Sexual Activity   Alcohol Use Never    Alcohol/week: 0 0 standard drinks     Social History     Substance and Sexual Activity   Drug Use No     Social History     Tobacco Use   Smoking Status Never Smoker   Smokeless Tobacco Never Used         Family History:   Family History   Problem Relation Age of Onset    Heart disease Family     Diabetes Family     Heart disease Mother     Cancer Brother         neck    Throat cancer Brother     Ovarian cancer Maternal Aunt 40       Meds/Allergies   Current meds:   Current Facility-Administered Medications   Medication Dose Route Frequency    acetaminophen (TYLENOL) tablet 650 mg  650 mg Oral Q6H PRN    acetaminophen (TYLENOL) tablet 975 mg  975 mg Oral Q8H [facility]    albuterol (PROVENTIL HFA,VENTOLIN HFA) inhaler 2 puff  2 puff Inhalation Q6H PRN    ALPRAZolam (XANAX) tablet 0 25 mg  0 25 mg Oral BID PRN    amLODIPine (NORVASC) tablet 5 mg  5 mg Oral Daily  atorvastatin (LIPITOR) tablet 20 mg  20 mg Oral Daily With Dinner    b complex-vitamin C-folic acid (NEPHROCAPS) capsule 1 capsule  1 capsule Oral Daily With Dinner    calcitriol (ROCALTROL) capsule 0 25 mcg  0 25 mcg Oral Once per day on Tue Thu Sat    carvedilol (COREG) tablet 25 mg  25 mg Oral BID    docusate sodium (COLACE) capsule 100 mg  100 mg Oral BID    gabapentin (NEURONTIN) capsule 100 mg  100 mg Oral TID    hydrALAZINE (APRESOLINE) injection 10 mg  10 mg Intravenous Q6H PRN    hydrOXYzine HCL (ATARAX) tablet 25 mg  25 mg Oral Q6H PRN    iron sucrose (VENOFER) 50 mg in sodium chloride 0 9 % 100 mL IVPB  50 mg Intravenous After Dialysis    levothyroxine tablet 50 mcg  50 mcg Oral Early Morning    lidocaine (LIDODERM) 5 % patch 1 patch  1 patch Topical Daily    loratadine (CLARITIN) tablet 5 mg  5 mg Oral Daily    [START ON 9/7/2022] LORazepam (ATIVAN) injection 1 mg  1 mg Intravenous Q30 Min PRN    melatonin tablet 3 mg  3 mg Oral HS    menthol-methyl salicylate (BENGAY) 92-39 % cream   Apply externally 4x Daily PRN    methocarbamol (ROBAXIN) tablet 500 mg  500 mg Oral Q6H PRN    OLANZapine (ZyPREXA) IM injection 2 5 mg  2 5 mg Intramuscular Once    ondansetron (ZOFRAN-ODT) dispersible tablet 4 mg  4 mg Oral Q6H PRN    oxyCODONE (ROXICODONE) IR tablet 2 5 mg  2 5 mg Oral Q6H PRN    pantoprazole (PROTONIX) EC tablet 40 mg  40 mg Oral BID    polyethylene glycol (MIRALAX) packet 17 g  17 g Oral Daily    pregabalin (LYRICA) capsule 50 mg  50 mg Oral Daily    senna (SENOKOT) tablet 17 2 mg  2 tablet Oral HS PRN    sertraline (ZOLOFT) tablet 75 mg  75 mg Oral Daily    sevelamer (RENAGEL) tablet 1,600 mg  1,600 mg Oral TID With Meals    sterile water injection **ADS Override Pull**               Allergies   Allergen Reactions    Iodinated Diagnostic Agents Itching    Keflex [Cephalexin] Hives    Wound Dressing Adhesive Rash       Objective   Vitals: Blood pressure 117/83, pulse 85, temperature 97 9 °F (36 6 °C), temperature source Oral, resp  rate 18, last menstrual period 02/12/2016, SpO2 (!) 79 %, not currently breastfeeding  ,There is no height or weight on file to calculate BMI  Physical Exam  Vitals and nursing note reviewed  HENT:      Head: Normocephalic  Nose: No congestion  Mouth/Throat:      Mouth: Mucous membranes are moist    Eyes:      General:         Right eye: No discharge  Cardiovascular:      Rate and Rhythm: Normal rate  Pulses: Normal pulses  Pulmonary:      Effort: Pulmonary effort is normal       Breath sounds: Normal breath sounds  Abdominal:      General: Bowel sounds are normal       Palpations: Abdomen is soft  Comments: obese   Musculoskeletal:         General: Normal range of motion  Cervical back: Normal range of motion  Skin:     General: Skin is warm and dry  Neurological:      Mental Status: She is alert and oriented to person, place, and time  Mental status is at baseline  Psychiatric:         Mood and Affect: Mood normal          Lab Results:   Results from last 7 days   Lab Units 09/04/22 1914   WBC Thousand/uL 4 25*   HEMOGLOBIN g/dL 7 6*   HEMATOCRIT % 24 1*   PLATELETS Thousands/uL 139*        Results from last 7 days   Lab Units 09/04/22 1914   POTASSIUM mmol/L 4 9   CHLORIDE mmol/L 100   CO2 mmol/L 33*   BUN mg/dL 32*   CREATININE mg/dL 7 51*   CALCIUM mg/dL 8 6   ALK PHOS U/L 105   ALT U/L 20   AST U/L 19       Imaging Studies: I have personally reviewed pertinent reports  EKG, Pathology, and Other Studies: I have personally reviewed pertinent reports      VTE Prophylaxis: Sequential compression device (Venodyne)     Code Status: Level 1 - Full Code

## 2022-09-06 NOTE — ASSESSMENT & PLAN NOTE
Lab Results   Component Value Date    HGBA1C 9 4 (H) 07/09/2022       Recent Labs     09/05/22  1612 09/05/22  2117 09/06/22  0511 09/06/22  1115   POCGLU 140 168* 116 126       Blood Sugar Average: Last 72 hrs:  (P) 144 [id number]   · On Lantus 12 units HS and NovoLog sliding scale at home   · Noted to have persistent hypoglycemia while hospitalized   · Lantus discontinued, continue PRN SSi coverage for now  · Endocrinology following, appreciate recommendations  · Encourage PO intake   · QID glucose checks  · Received 2 amps D50 8/31  · Diabetic diet  · Could be contributing to patient's overall presentation

## 2022-09-06 NOTE — PROGRESS NOTES
[address] [facility]  Progress Note Emir Solano 1965, 56 y o  female MRN: [id number]  Unit/Bed#: CW2 210-01 Encounter: [id number]  Primary Care Provider: Jose Alfredo Oquendo MD   Date and time admitted to hospital: 8/30/2022 10:34 AM    * Lethargy  Assessment & Plan  · Patient was in her usual state of health, after finishing dialysis on day of admission when she had an episode of decreased level of consciousness  Patient states she was able to hear nurses calling her name but she could not answer  Per EMS no seizure-like activities and shortly she was back at her baseline mentation  She had a sharp right-sided back pain which she has been dealing with since last year  Per ED, it was not reported that she was hypotensive during this episode  · Unclear cause  ?  Transient hypotension which she had in the past during dialysis  Less likely seizure, no seizure-like activities reported per facility, and shortly she was back at her baseline mentation  Possibly pain related vs  Related to hypoglycemia   · Nonfocal neurologic exam  ·  this is waxing and waning - would consider MRI however now pending MRI lumbar spine pt with little ability to sustain position for longer period of time therefore   · CT head negative for acute pathology - consider MRI if ongoing concern  · Telemetry discontinued as pt taking leads off, no events noted   · Pt with persistent hypoglycemia here, endocrinology consulted, lantus discontinued, SSI coverage PRN with improvement and improvement in mentation as well   · Possible metabolic/toxic encephalopathy in setting of hypoglycemia, sedating medications treated with D50, adjusting medications and monitoring mental status   · Pt's significant other concerned regarding some forgetfulness/memory loss issues and tremors of the bilateral hands, reports parkinsons runs in the patient's family   Recommended  neurology/cognitive testing if ongoing  ·  ct head stable possible need for mri     Lumbar radiculopathy  Assessment & Plan  · Previous provider reviewed note from neuro surgery 08/04/2022  · Low back pain probably/likely facet joint related  Multiple admissions and ED visits for right-sided back pain that radiates to her legs, more on the right side  Prior MRI demonstrated moderately advanced degenerative lumbar spine disease at multi levels  L4-5 disc herniation more on the right with moderate central canal stenosis  MRI with questionable L4-L5 osteomyelitis/diskitis per neuro surgery and ID highly unlikely  Underwent bilateral L3, L4-L5 dorsal ramus block at [facility] in May 2022 with no improvement in symptoms  · Due to recent fall prior to her previous admission at the beginning of August CT of the right leg was ordered in the ED  No acute displaced fracture, mild osteoarthrosis  Possible cystitis   · Due to possible cystitis on CT scan, UA ordered and not yet obtained  Patient makes minimal urine  · Conservative measures with aqua k pad, bengay, Lyrica  · Continue decreased PRN oxycodone to 2 5 mg and PRN Robaxin, improvement in mentation noted  · NO IV MEDICATIONS PLEASE - DT restlessness has removed iv sites by accident   · PT/OT recommending STR which patient is in agreement with currently   · Pt reporting more pain in the right leg today, increase oxycodone to 2 5 mg Q6H, has not been utilizing her PRN oxycodone very often  · Monitor symptoms, Pt known to me  Upon entry to room pt is clearly uncomfortable   Hemo sessions are cut short as pt unable to tolerate due to pain ?    · Significant other reports that she is not taking pain meds or THC (he doesn't allow this in his home) He states pt was due for neurosx anahi this next week and has seen outpt acute pain   · Consult neurosx - appreciate discussion   · Plan for MRI w contrast Tuesday then hemo needs to be done post mri and again on Wednesday   · Evaluate any worsening or improvement from prior mri possible need for sx intervention     Elevated troponin  Assessment & Plan  · Suspect non MI troponin elevation secondary to hypertensive urgency on admission   · Also with underlying ESRD on HD  · Denies any chest pain palpitation or shortness of breath, EKG without acute ischemic changes    Constipation  Assessment & Plan  · Continue daily miralax and colace  · Monitor stool output, last BM documented on 8/31  · Added one time lactulose had small bm today     Acquired hypothyroidism  Assessment & Plan  · Continue levothyroxine 50 mcg daily    Anemia of chronic disease  Assessment & Plan  · Secondary to end-stage renal disease  · Hemoglobin at baseline at 8 0, unable to obtain labs this am nursing to reattempt     Esophageal reflux  Assessment & Plan  · Continue PPI    Anxiety disorder  Assessment & Plan  · Continue Xanax 0 25 mg b i d  p r n , hold for sedation   · Continue Zoloft 75 mg daily    ESRD on hemodialysis  Assessment & Plan  Lab Results   Component Value Date    EGFR 5 09/04/2022    EGFR 5 09/01/2022    EGFR 6 08/31/2022    CREATININE 7 51 (H) 09/04/2022    CREATININE 8 05 (H) 09/01/2022    CREATININE 6 46 (H) 08/31/2022   · On dialysis TTS  · Nephrology consulted for HD management,  · Pt will need hemo coordinated with MRI imaging     Essential hypertension  Assessment & Plan  · Hypertensive urgency noted on admission, likely in setting of missing dose of coreg on day of admit   · Overall much improved, BP stabilized   · Continue Coreg 25 mg BID  · Torsemide and nifedipine were discontinued on previous admission 8/15 due to hypotension  · Monitor         VTE Pharmacologic Prophylaxis: VTE Score: 3 coumadin on hold due to high INR    Patient Centered Rounds: I performed bedside rounds with nursing staff today    Discussions with Specialists or Other Care Team Provider: [facility] and renal    Education and Discussions with Family / Patient: Updated  (significant other) at bedside  Time Spent for Care: 30 minutes  More than 50% of total time spent on counseling and coordination of care as described above  Current Length of Stay: 6 day(s)  Current Patient Status: Inpatient   Certification Statement: The patient will continue to require additional inpatient hospital stay due to need for MRI and then HD  Discharge Plan: Anticipate discharge in 48-72 hrs to rehab facility  Code Status: Level 1 - Full Code    Subjective:   Pt agitated and screaming during HD so it was stopped 30 min early    Objective:     Vitals:   Temp (24hrs), Av 8 °F (36 6 °C), Min:97 5 °F (36 4 °C), Max:98 3 °F (36 8 °C)    Temp:  [97 5 °F (36 4 °C)-98 3 °F (36 8 °C)] 97 9 °F (36 6 °C)  HR:  [60-85] 85  Resp:  [18-20] 18  BP: (117-220)/() 117/83  There is no height or weight on file to calculate BMI  Input and Output Summary (last 24 hours): Intake/Output Summary (Last 24 hours) at 2022 1342  Last data filed at 2022 1122  Gross per 24 hour   Intake 740 ml   Output 1963 ml   Net -1223 ml       Physical Exam:   Physical Exam  HENT:      Head: Normocephalic and atraumatic  Nose: Nose normal       Mouth/Throat:      Mouth: Mucous membranes are moist    Eyes:      Extraocular Movements: Extraocular movements intact  Conjunctiva/sclera: Conjunctivae normal    Cardiovascular:      Rate and Rhythm: Normal rate and regular rhythm  Pulmonary:      Effort: Pulmonary effort is normal       Breath sounds: Normal breath sounds  Abdominal:      General: Bowel sounds are normal       Palpations: Abdomen is soft  Musculoskeletal:         General: Normal range of motion  Cervical back: Normal range of motion and neck supple  Right lower leg: No edema  Left lower leg: No edema  Skin:     General: Skin is warm and dry  Neurological:      Mental Status: She is alert and oriented to person, place, and time           Additional Data:     Labs:  Results from last 7 days Lab Units 09/04/22  1914   WBC Thousand/uL 4 25*   HEMOGLOBIN g/dL 7 6*   HEMATOCRIT % 24 1*   PLATELETS Thousands/uL 139*   NEUTROS PCT % 61   LYMPHS PCT % 21   MONOS PCT % 12   EOS PCT % 4     Results from last 7 days   Lab Units 09/04/22  1914   SODIUM mmol/L 135   POTASSIUM mmol/L 4 9   CHLORIDE mmol/L 100   CO2 mmol/L 33*   BUN mg/dL 32*   CREATININE mg/dL 7 51*   ANION GAP mmol/L 2*   CALCIUM mg/dL 8 6   ALBUMIN g/dL 2 7*   TOTAL BILIRUBIN mg/dL 0 75   ALK PHOS U/L 105   ALT U/L 20   AST U/L 19   GLUCOSE RANDOM mg/dL 190*     Results from last 7 days   Lab Units 09/06/22  1048   INR  3 31*     Results from last 7 days   Lab Units 09/06/22  1115 09/06/22  0511 09/05/22  2117 09/05/22  1612 09/05/22  1134 09/05/22  0708 09/04/22  2103 09/04/22  1505 09/04/22  1107 09/04/22  0745 09/03/22  2138 09/03/22  1556   POC GLUCOSE mg/dl 126 116 168* 140 133 126 166* 138 160* 144* 159* 167*               Lines/Drains:  Invasive Devices  Report    Peripheral Intravenous Line  Duration           Peripheral IV 09/05/22 Right;Ventral (anterior) Forearm <1 day          Line  Duration           Hemodialysis AV Fistula 02/20/18 Left Forearm 1658 days                      Imaging: No pertinent imaging reviewed      Recent Cultures (last 7 days):         Last 24 Hours Medication List:   Current Facility-Administered Medications   Medication Dose Route Frequency Provider Last Rate    acetaminophen  650 mg Oral Q6H PRN Grace Rodríguez MD      acetaminophen  975 mg Oral Q8H [facility] Grace Rodríguez MD      albuterol  2 puff Inhalation Q6H PRN Grace Rodríguez MD      ALPRAZolam  0 25 mg Oral BID PRN Grace Rodríguez MD      amLODIPine  5 mg Oral Daily Sami Teresa MD      atorvastatin  20 mg Oral Daily With Sherren Seltzer, MD      b complex-vitamin C-folic acid  1 capsule Oral Daily With Sherren Seltzer, MD      calcitriol  0 25 mcg Oral Once per day [facility] Tue Thu Sat [facility] [address] [facility] [place] carvedilol  25 mg Oral BID Ute Alvarenga MD      docusate sodium  100 mg Oral BID Maile Houser PA-C      gabapentin  100 mg Oral TID Ute Alvarenga MD      hydrALAZINE  10 mg Intravenous Q6H PRN Julio Meyers DO      hydrOXYzine HCL  25 mg Oral Q6H PRN Ute Alvarenga MD      iron sucrose  50 mg Intravenous After Dialysis MD Kathy Joseph Courser levothyroxine  50 mcg Oral Early Morning Ute Alvarenga MD      lidocaine  1 patch Topical Daily Ute Alvarenga MD      loratadine  5 mg Oral Daily MD Kathy Donahue Courser [START ON 9/7/2022] LORazepam  1 mg Intravenous Q30 Min PRN Julio Meyers DO      melatonin  3 mg Oral HS Ute Alvarenga MD      menthol-methyl salicylate   Apply externally 4x Daily PRN Ute Alvarenga MD      methocarbamol  500 mg Oral Q6H PRN Maile Houser PA-C      OLANZapine  2 5 mg Intramuscular Once Julio Meyers DO      ondansetron  4 mg Oral Q6H PRN LOIS Mcneill      oxyCODONE  2 5 mg Oral Q6H PRN Maile Houser PA-C      pantoprazole  40 mg Oral BID Ute Alvarenga MD      polyethylene glycol  17 g Oral Daily Maile Barr PA-C      pregabalin  50 mg Oral Daily Ute Alvarenga MD      senna  2 tablet Oral HS PRN Ute Alvarenga MD      sertraline  75 mg Oral Daily Ute Alvarenga MD      sevelamer  1,600 mg Oral TID With Meals Ute Alvarenga MD      sterile water               Today, Patient Was Seen By: Julio Meyers DO    **Please Note: This note may have been constructed using a voice recognition system  **

## 2022-09-06 NOTE — CONSULTS
Consult Note- Acute Pain Service   Kat Shepherd 56 y o  female MRN: [id number]  Unit/Bed#: CW2 210-01 Encounter: [id number]               Assessment/Plan     Assessment:   Patient Active Problem List   Diagnosis    Essential hypertension    History of venous thromboembolism    Abnormal uterine bleeding    Glaucoma    ESRD on hemodialysis    Anxiety disorder    Knee pain    [facility]od[facility] status ([facility]    Primary osteoarthritis of right knee    Esophageal reflux    Colon cancer screening    Gastroesophageal reflux disease    Meningioma (HCC)    Nausea and vomiting    Secondary hyperparathyroidism of renal origin ([facility]    Dyspnea    Hyperkalemia    Anemia of chronic disease    Disruption of internal surgical wound    Hyponatremia    Parenchymal renal hypertension    Candidiasis of breast    Hemodialysis-associated hypotension    Lumbar radiculopathy    Low back pain with sciatica    Flu-like symptoms    Diabetic polyneuropathy associated with type 2 diabetes mellitus ([facility]    Tinea pedis    [facility] foot [facility]    Corns and callosities    History of transmetatarsal amputation of right foot ([facility]    Morbid obesity    Hyperlipidemia    History of claustrophobia    Allergic rhinitis    Benign neoplasm of skin    Cardiomyopathy ([facility]    Cervical dysplasia    Chronic endometritis    Complication from renal dialysis device    Concussion without loss of consciousness    Type 2 diabetes mellitus ([facility]    Acquired hypothyroidism    Legal blindness, as defined in [place] [facility]    Limb pain    Mononeuritis of upper limb, unspecified laterality    Phlebitis and thrombophlebitis of superficial vessels of lower extremities    Pulmonary embolus ([facility]    S/P foot surgery, right    Tinea unguium    Bilateral primary osteoarthritis of knee    Bilateral patellofemoral syndrome    Chronic pain syndrome    A-V fistula (HCC)    Right foot pain    Arteriovenous fistula for hemodialysis in place, primary ([facility]    Healthcare-associated pneumonia    Pneumonia    Chronic anticoagulation    Sigmoid diverticulitis    Postop check    Pruritus    Constipation    Right knee pain    Fall    Hypoxia    COVID-19    Problem with vascular access    Concern for Osteomyelitis/Discitis of lumbar region    Intractable back pain    Lethargy    Elevated troponin      Marcelino Khan is a 56 y o  female with a past medical history significant for ESRD on hemodialysis, hypertension, hypothyroidism, type 2 diabetes mellitus with neuropathy, DVT/PE on warfarin and chronic pain syndrome in the setting of lumbar degenerative disc disease and moderate spinal stenosis who initially presented due to decreased level consciousness following hemodialysis with unclear cause though there is consideration for hypoglycemia versus toxic/metabolic encephalopathy  Acute pain service was consulted for acute on chronic lumbar radiculopathy  Plan:   - decrease acetaminophen to 975 mg p o  Q 8 hours scheduled (stop additional prn dose)  - continue oxycodone 2 5 mg p o  Q 6 hours p r n  For moderate or severe pain  - discontinue gabapentin  - continue pregabalin 50 mg p o  Daily  - increase lidocaine patches to 2 patches 12 hours on/12 hours off daily  - continue BenGay cream however only use around patches  - would avoid NSAIDs  - discontinue methocarbamol  - of note, patient is on alprazolam  - consider discontinuation of hydroxyzine  - continue bowel regimen with docusate, senna, MiraLax  - appreciate neurosurgery recommendation - plan for MRI lumbar spine    APS will continue to follow  Please contact Acute Pain Service - [facility] via [employer] from [phone] with additional questions or concerns  See Aishwarya or Rciki for additional contacts and after hours information      History of Present Illness    Admit Date:  8/30/2022  Hospital Day:  6 days  Primary Service:  Hospitalist  Attending Provider: Vanessa Vogel DO  Reason for Consult / Principal Problem: acute on chronic low back pain  HPI: Zoë Chance is a 56 y o  female with a past medical history significant for ESRD on hemodialysis, hypothyroidism, hypertension, type 2 diabetes mellitus with neuropathy, DVT/PE on warfarin and chronic pain syndrome in the setting of lumbar degenerative disc disease and moderate spinal stenosis who initially presented due to decreased level consciousness following hemodialysis with unclear cause though there is consideration for hypoglycemia versus toxic/metabolic encephalopathy  Since arrival, patient has been hemodynamically labile with blood pressure as high as 220/79  She has remained afebrile  Most recent labs significant for INR 3 31, creatinine 7 51, AST 19, ALT 20  CT head without acute intracranial abnormality  Of note, patient with recent fall requiring ED visit  At that time CT right lower extremity was performed which did not show any displaced fracture otherwise mild osteoarthrosis  Also of note, on evaluation patient asked Andra Baller somebody there?", which appear to be in setting of visual hallucination  Current pain location(s): low back with radiculopathy R>L LE  Pain Scale:   0-8  Quality: sharp, shooting  Current Analgesic regimen:    Acetaminophen 650 mg p o  Q 6 hours p r n  For mild pain  Acetaminophen 975 mg p o  Q 8 hours scheduled  Oxycodone 2 5 mg p o  Q 6 hours p r n  For moderate or severe pain  Alprazolam 0 25 mg p o  B i d  P r n  For anxiety  Hydroxyzine 25 mg p o  Q 6 hours p r n  For anxiety  Methocarbamol 500 mg p o  Q 6 hours p r n  For muscle spasm  Pregabalin 50 mg p o  Daily  Lidocaine patch 12 hours on/12 hours off  Wes-Flood cream 4 times daily p r n  For muscle soreness      Pain History:  Patient follows with comprehensive Spine program   Is undergoing outpatient setting  She also states that she takes Robaxin and Tylenol    Per most recent note from outpatient provider, patient is not on opioids secondary to her use of marijuana  I have reviewed the patient's controlled substance dispensing history in the Prescription Drug Monitoring Program in compliance with the [facility] regulations before prescribing any controlled substances  Inpatient consult to Acute Pain Service  Consult performed by: Zion Lees MD  Consult ordered by: LOIS Simon          Review of Systems   Constitutional: Negative for appetite change, chills, diaphoresis, fatigue, fever and unexpected weight change  HENT: Negative for sore throat  Eyes: Negative for visual disturbance  Respiratory: Negative for cough, chest tightness, shortness of breath and wheezing  Cardiovascular: Negative for chest pain, palpitations and leg swelling  Gastrointestinal: Negative for abdominal distention, abdominal pain, blood in stool, constipation, diarrhea, nausea and vomiting  Genitourinary: Negative for difficulty urinating, flank pain and urgency  Musculoskeletal: Positive for back pain  Negative for arthralgias and myalgias  Skin: Negative for pallor and rash  Neurological: Negative for dizziness, weakness, light-headedness and headaches         Historical Information   Past Medical History:   Diagnosis Date    Abnormal uterine bleeding (AUB)     Anemia     Anxiety     Arthritis     Chronic kidney disease     Claustrophobia     Diabetes mellitus [facility]     Disease of thyroid gland     DVT (deep venous thrombosis) (HCC)     End stage kidney disease (HCC)     Foot ulcer due to secondary DM ([facility]     Hemodialysis patient ([facility]     Tues, Thurs, Sat    Hypertension     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[facility]     right eye    Morbid obesity ([facility]     Osteomyelitis of fifth toe of right foot (HCC)     Panic attacks     Pulmonary embolism (HCC)     Reflux esophagitis     Thrombophlebitis of saphenous vein     Warfarin anticoagulation      Past Surgical History:   Procedure Laterality Date    AMPUTATION      r 4th toe    ARTERIOVENOUS GRAFT PLACEMENT      CATARACT EXTRACTION Bilateral     CERVICAL BIOPSY  W/ LOOP ELECTRODE EXCISION       SECTION      DIALYSIS FISTULA CREATION      DILATION AND CURETTAGE OF UTERUS      ENDOMETRIAL ABLATION W/ NOVASURE      EYE SURGERY      HYSTERECTOMY      @ age 57 (complete)    IR AV FISTULAGRAM/GRAFTOGRAM  10/11/2019    IR AV FISTULAGRAM/GRAFTOGRAM  2020    IR AV FISTULAGRAM/GRAFTOGRAM  2020    IR NON-TUNNELED CENTRAL LINE PLACEMENT  2021    IR NON-TUNNELED CENTRAL LINE PLACEMENT  10/4/2021    OOPHORECTOMY Bilateral     @ age 57    [facility] Right 2021    Procedure: AMPUTATION TRANSMETATARSAL (TMA);  Surgeon: Shantel Orantes DPM;  [facility];  Service: Podiatry    CO AMPUTATION TOE,MT-P JT Right 2020    Procedure: AMPUTATION TOE- 5th;  Surgeon: Shantel Orantes DPM;  [facility];  Service: Podiatry    CO COLONOSCOPY FLX DX W/COLLJ [id number] PFRMD N/A 3/13/2019    Procedure: COLONOSCOPY;  Surgeon: Margo Newton MD;  Location: [facility]; Service: Gastroenterology    CO ESOPHAGOGASTRODUODENOSCOPY TRANSORAL DIAGNOSTIC N/A 3/13/2019    Procedure: ESOPHAGOGASTRODUODENOSCOPY (EGD); Surgeon: Margo Newton MD;  Location: [facility];   Service: Gastroenterology    CO LAPAROSCOPY W TOT HYSTERECT UTERUS 250 GRAM OR LESS N/A 2016    Procedure: HYSTERECTOMY LAPAROSCOPIC TOTAL [facility]), with bilateral salpingectomy;  Surgeon: Niecy Gibbs DO;  [facility]: [facility];  Service: Gynecology    THROMBECTOMY / ARTERIOVENOUS GRAFT REVISION      TOE SURGERY Right     removal of the 4th toe    WOUND DEBRIDEMENT Right 10/8/2021    Procedure: R 1st MTPJ washout ;  Surgeon: Anila Jett DPM;  [facility];  Service: Podiatry     Social History   Social History     Substance and Sexual Activity   Alcohol Use Never    Alcohol/week: 0 0 standard drinks     Social History     Substance and Sexual Activity   Drug Use No     Social History     Tobacco Use   Smoking Status Never Smoker   Smokeless Tobacco Never Used     Family History:   Family History   Problem Relation Age of Onset    Heart disease Family     Diabetes Family     Heart disease Mother     Cancer Brother         neck    Throat cancer Brother     Ovarian cancer Maternal Aunt 40       Meds/Allergies   all current active meds have been reviewed, current meds:   Current Facility-Administered Medications   Medication Dose Route Frequency    acetaminophen (TYLENOL) tablet 650 mg  650 mg Oral Q6H PRN    acetaminophen (TYLENOL) tablet 975 mg  975 mg Oral Q8H [address]    albuterol (PROVENTIL HFA,VENTOLIN HFA) inhaler 2 puff  2 puff Inhalation Q6H PRN    ALPRAZolam (XANAX) tablet 0 25 mg  0 25 mg Oral BID PRN    amLODIPine (NORVASC) tablet 5 mg  5 mg Oral Daily    atorvastatin (LIPITOR) tablet 20 mg  20 mg Oral Daily With Dinner    b complex-vitamin C-folic acid (NEPHROCAPS) capsule 1 capsule  1 capsule Oral Daily With Dinner    calcitriol (ROCALTROL) capsule 0 25 mcg  0 25 mcg Oral Once per day on Tue Thu Sat    carvedilol (COREG) tablet 25 mg  25 mg Oral BID    docusate sodium (COLACE) capsule 100 mg  100 mg Oral BID    hydrALAZINE (APRESOLINE) injection 10 mg  10 mg Intravenous Q6H PRN    hydrOXYzine HCL (ATARAX) tablet 25 mg  25 mg Oral Q6H PRN    iron sucrose (VENOFER) 50 mg in sodium chloride 0 9 % 100 mL IVPB  50 mg Intravenous After Dialysis    levothyroxine tablet 50 mcg  50 mcg Oral Early Morning    lidocaine (LIDODERM) 5 % patch 1 patch  1 patch Topical Daily    loratadine (CLARITIN) tablet 5 mg  5 mg Oral Daily    [START ON 9/7/2022] LORazepam (ATIVAN) injection 1 mg  1 mg Intravenous Q30 Min PRN    melatonin tablet 3 mg  3 mg Oral HS    menthol-methyl salicylate (BENGAY) 99-94 % cream   Apply externally 4x Daily PRN    methocarbamol (ROBAXIN) tablet 500 mg  500 mg Oral Q6H PRN    OLANZapine (ZyPREXA) IM injection 2 5 mg  2 5 mg Intramuscular Once    ondansetron (ZOFRAN-ODT) dispersible tablet 4 mg  4 mg Oral Q6H PRN    oxyCODONE (ROXICODONE) IR tablet 2 5 mg  2 5 mg Oral Q6H PRN    pantoprazole (PROTONIX) EC tablet 40 mg  40 mg Oral BID    polyethylene glycol (MIRALAX) packet 17 g  17 g Oral Daily    pregabalin (LYRICA) capsule 50 mg  50 mg Oral Daily    senna (SENOKOT) tablet 17 2 mg  2 tablet Oral HS PRN    sertraline (ZOLOFT) tablet 75 mg  75 mg Oral Daily    sevelamer (RENAGEL) tablet 1,600 mg  1,600 mg Oral TID With Meals    sterile water injection **ADS Override Pull**        and PTA meds:   Prior to Admission Medications   Prescriptions Last Dose Informant Patient Reported? Taking? ALPRAZolam (XANAX) 0 25 mg tablet  Self Yes No   Sig: Take 0 25 mg by mouth 2 (two) times a day as needed for anxiety   ALPRAZolam (XANAX) 0 5 mg tablet   Yes No   Sig: TAKE 2 AND 1/2 TABLETS DAILY IN DIVIDED DOSES AS DIRECTED  Accu-Chek Guide test strip   Yes No   Sig: use to TEST BLOOD SUGAR two to three times a day   Accu-Chek Softclix Lancets lancets  Self Yes No   Sig: 3 (three) times a day Use to test blood sugar   B Complex-C-Folic Acid (Natalia-Denys) TABS   No No   Sig: TAKE ONE TABLET BY MOUTH DAILY   KIONEX 15 GM/60ML suspension  Self Yes No   Sig: Take by mouth Takes as a shake on dialysis days Tues-Thurs_Sat    Multiple Vitamin (MULTIVITAMIN ADULT PO)   Yes No   Sig: Take 500 mg by mouth in the morning   Indications: one a day menapause multivitamin   NOVOLOG FLEXPEN 100 units/mL SOPN  Self Yes No   Sig: INJECT 1 TO 5 UNITS SUBCUTANEOUSLY THREE TIMES A DAY BEFORE MELAS AS PER SLIDING SCALE   acetaminophen (TYLENOL) 325 mg tablet   No No   Sig: Take 3 tablets (975 mg total) by mouth every 8 (eight) hours   albuterol (ProAir HFA) 90 mcg/act inhaler   No No   Sig: Inhale 2 puffs every 6 (six) hours as needed for wheezing or shortness of breath   ammonium lactate (LAC-HYDRIN) 12 % cream   No No   Sig: Apply topically 2 (two) times a day   atorvastatin (LIPITOR) 20 mg tablet  Self Yes No   Sig: Take 20 mg by mouth every evening    carvedilol (COREG) 25 mg tablet   No No   Sig: TAKE ONE TABLET BY MOUTH TWICE A DAY   dicyclomine (BENTYL) 10 mg capsule   Yes No   Sig: TAKE 1 TABLET BY MOUTH EVERY 6 HOURS OR AS NEEDED FOR PAIN   gabapentin (NEURONTIN) 100 mg capsule   Yes No   Sig: Take 100 mg by mouth 3 (three) times a day   Indications: Neuropathic Pain   insulin glargine (LANTUS) 100 units/mL subcutaneous injection   No No   Sig: Inject 12 Units under the skin daily at bedtime   levothyroxine 50 mcg tablet  Self Yes No   Sig: Take 50 mcg by mouth daily   lidocaine (LIDODERM) 5 %   Yes No   lidocaine (LIDODERM) 5 %   No No   Sig: Apply 1 patch topically daily Remove & Discard patch within 12 hours or as directed by MD   loratadine (CLARITIN) 10 mg tablet  Self Yes No   Sig: Take 10 mg by mouth daily   melatonin 3 mg   No No   Sig: Take 1 tablet (3 mg total) by mouth daily at bedtime   menthol-methyl salicylate (BENGAY) 03-79 % cream   No No   Sig: Apply topically 4 (four) times a day as needed (muscle soreness)   methocarbamol (ROBAXIN) 500 mg tablet   No No   Sig: Take 1 tablet (500 mg total) by mouth every 6 (six) hours   nystatin (MYCOSTATIN) powder  Self No No   Sig: Apply topically 2 (two) times a day   ondansetron (ZOFRAN-ODT) 8 mg disintegrating tablet   Yes No   oxyCODONE (ROXICODONE) 5 immediate release tablet   No No   Sig: Take 0 5 tablets (2 5 mg total) by mouth every 6 (six) hours as needed for moderate pain for up to 10 days Continuation of care Max Daily Amount: 10 mg   pantoprazole (PROTONIX) 40 mg tablet   Yes No   Sig: take 1 tablet by mouth twice a day   polyethylene glycol (MIRALAX) 17 g packet   No No   Sig: Take 17 g by mouth daily   pregabalin (LYRICA) 50 mg capsule   No No   Sig: Take 1 PO daily, take 1 PO additionally directly have HD on HD days   senna (SENOKOT) 8 6 mg  Self No No   Sig: Take 2 tablets (17 2 mg total) by mouth daily at bedtime as needed for constipation   sertraline (ZOLOFT) 50 mg tablet   No No   Sig: Take 1 5 tablets (75 mg total) by mouth daily   sevelamer (RENAGEL) 800 mg tablet   No No   Sig: Take 2 tablets (1,600 mg total) by mouth 3 (three) times a day with meals   urea (CARMOL) 40 %   No No   Sig: APPLY TO AFFECTED AREA TOPICALLY EVERY DAY   warfarin (COUMADIN) 3 mg tablet  Self No No   Sig: Take 3 tablets (9 mg total) by mouth daily Takes 9 mg Monday Sat      Facility-Administered Medications: None       Allergies   Allergen Reactions    Iodinated Diagnostic Agents Itching    Keflex [Cephalexin] Hives    Wound Dressing Adhesive Rash       Objective   Temp:  [97 5 °F (36 4 °C)-98 3 °F (36 8 °C)] 97 9 °F (36 6 °C)  HR:  [60-68] 65  Resp:  [20] 20  BP: (128-220)/(58-90) 220/79    Intake/Output Summary (Last 24 hours) at 9/6/2022 0900  Last data filed at 9/6/2022 0830  Gross per 24 hour   Intake 440 ml   Output --   Net 440 ml       Physical Exam  Vitals and nursing note reviewed  Constitutional:       Appearance: Normal appearance  She is not ill-appearing or toxic-appearing  HENT:      Head: Normocephalic and atraumatic  Eyes:      General: No scleral icterus  Conjunctiva/sclera: Conjunctivae normal    Cardiovascular:      Rate and Rhythm: Normal rate  Pulmonary:      Effort: Pulmonary effort is normal  No respiratory distress  Musculoskeletal:         General: Tenderness (Low back) present  Skin:     General: Skin is warm and dry  Neurological:      Mental Status: She is alert  Comments: Awake, alert and oriented to person place time situation though she appears to be having visual hallucinations   Psychiatric:         Thought Content: Thought content normal          Lab Results: I have personally reviewed pertinent labs  , CBC: No results found for: WBC, HGB, HCT, MCV, PLT, ADJUSTEDWBC, MCH, MCHC, RDW, MPV, NRBC, CMP: No results found for: SODIUM, K, CL, CO2, ANIONGAP, BUN, CREATININE, GLUCOSE, CALCIUM, AST, ALT, ALKPHOS, PROT, BILITOT, EGFR, BMP:No results found for: SODIUM, K, CL, CO2, BUN, CREATININE, GLUC, GLUF, CALCIUM, AGAP, EGFR, PT/PTT:No results found for: PT, PTT    Imaging Studies: I have personally reviewed pertinent reports  EKG, Pathology, and Other Studies: I have personally reviewed pertinent reports  Please note that the APS provides consultative services regarding pain management only  With the exception of ketamine and epidural infusions and except when indicated, final decisions regarding starting or changing doses of analgesic medications are at the discretion of the consulting service  Off hours consultation and/or medication management is generally not available      Keshav Ramachandran MD  Acute Pain Service

## 2022-09-06 NOTE — PLAN OF CARE
Serum potassium from 9/4/22 is 4 9, on 2 K bath   Will attempt for UF-2 kg as tolerated  Problem: METABOLIC, FLUID AND ELECTROLYTES - ADULT  Goal: Electrolytes maintained within normal limits  Description: INTERVENTIONS:  - Monitor labs and assess patient for signs and symptoms of electrolyte imbalances  - Administer electrolyte replacement as ordered  - Monitor response to electrolyte replacements, including repeat lab results as appropriate  - Instruct patient on fluid and nutrition as appropriate  Outcome: Progressing  Goal: Fluid balance maintained  Description: INTERVENTIONS:  - Monitor labs   - Monitor I/O and WT  - Instruct patient on fluid and nutrition as appropriate  - Assess for signs & symptoms of volume excess or deficit  Outcome: Progressing

## 2022-09-06 NOTE — TELEPHONE ENCOUNTER
09/08/2022-CALLED PT, SPOKE TO JEANETH (BOYFRIEND), CONFIRMED 09/28/2022 APT IN [place] OFFICE       09/07/2022-PT STILL IN HOSPITAL, PER JORGE: cancel her for now   She is pending an MRI   We will see what needs to be done after updated imaging   09/08/2022 APT IS CX  RESCHEDULED PER FIFI BECKETT/PATRICIA FOR 09/28/2022 09/06/2022-PT Anny 25  09/08/2022 SNPX W/DERRICK W/LUMBAR SPINE XRAY

## 2022-09-07 ENCOUNTER — TELEPHONE (OUTPATIENT)
Dept: RADIOLOGY | Facility: HOSPITAL | Age: 57
End: 2022-09-07

## 2022-09-07 VITALS
OXYGEN SATURATION: 79 % | SYSTOLIC BLOOD PRESSURE: 152 MMHG | TEMPERATURE: 97.8 F | RESPIRATION RATE: 18 BRPM | DIASTOLIC BLOOD PRESSURE: 70 MMHG | HEART RATE: 85 BPM

## 2022-09-07 PROCEDURE — 99239 HOSP IP/OBS DSCHRG MGMT >30: CPT | Performed by: GENERAL PRACTICE

## 2022-09-07 PROCEDURE — 99232 SBSQ HOSP IP/OBS MODERATE 35: CPT | Performed by: INTERNAL MEDICINE

## 2022-09-07 RX ORDER — AMLODIPINE BESYLATE 5 MG/1
5 TABLET ORAL DAILY
Qty: 30 TABLET | Refills: 0 | Status: SHIPPED | OUTPATIENT
Start: 2022-09-07

## 2022-09-07 RX ORDER — CALCITRIOL 0.25 UG/1
0.25 CAPSULE, LIQUID FILLED ORAL 3 TIMES WEEKLY
Qty: 12 CAPSULE | Refills: 0 | Status: SHIPPED | OUTPATIENT
Start: 2022-09-08

## 2022-09-07 RX ORDER — DOCUSATE SODIUM 100 MG/1
100 CAPSULE, LIQUID FILLED ORAL 2 TIMES DAILY
Qty: 60 CAPSULE | Refills: 0 | Status: SHIPPED | OUTPATIENT
Start: 2022-09-07

## 2022-09-07 RX ORDER — WARFARIN SODIUM 3 MG/1
6 TABLET ORAL
Qty: 10 TABLET | Refills: 0
Start: 2022-09-08

## 2022-09-07 RX ADMIN — LORATADINE 5 MG: 10 TABLET ORAL at 10:23

## 2022-09-07 RX ADMIN — AMLODIPINE BESYLATE 5 MG: 5 TABLET ORAL at 10:22

## 2022-09-07 RX ADMIN — CARVEDILOL 25 MG: 25 TABLET, FILM COATED ORAL at 10:23

## 2022-09-07 RX ADMIN — PREGABALIN 50 MG: 50 CAPSULE ORAL at 10:23

## 2022-09-07 RX ADMIN — SERTRALINE HYDROCHLORIDE 75 MG: 50 TABLET ORAL at 10:30

## 2022-09-07 RX ADMIN — DOCUSATE SODIUM 100 MG: 100 CAPSULE, LIQUID FILLED ORAL at 10:22

## 2022-09-07 RX ADMIN — PANTOPRAZOLE SODIUM 40 MG: 40 TABLET, DELAYED RELEASE ORAL at 10:22

## 2022-09-07 NOTE — DISCHARGE SUMMARY
[address] [facility]  Discharge- Blease Moose 1965, 56 y o  female MRN: [id number]  Unit/Bed#: Noah Watkins 210-01 Encounter: [id number]  Primary Care Provider: Nam Huffman MD   Date and time admitted to hospital: 8/30/2022 10:34 AM    * Toxic metabolic encephalopathy  Assessment & Plan  · Patient was in her usual state of health, after finishing dialysis on day of admission when she had an episode of decreased level of consciousness  Patient states she was able to hear nurses calling her name but she could not answer  Per EMS no seizure-like activities and shortly she was back at her baseline mentation  She had a sharp right-sided back pain which she has been dealing with since last year  Per ED, it was not reported that she was hypotensive during this episode  · Unclear cause  ?  Transient hypotension which she had in the past during dialysis  Less likely seizure, no seizure-like activities reported per facility, and shortly she was back at her baseline mentation  Possibly pain related vs  Related to hypoglycemia   · Nonfocal neurologic exam  ·  this is waxing and waning - would consider MRI however now pending MRI lumbar spine pt with little ability to sustain position for longer period of time therefore   · CT head negative for acute pathology - consider MRI if ongoing concern  · Telemetry discontinued as pt taking leads off, no events noted   · Pt with persistent hypoglycemia here, endocrinology consulted, lantus discontinued, SSI coverage PRN with improvement and improvement in mentation as well   · Likely metabolic/toxic encephalopathy in setting of hypoglycemia, sedating medications, adjusting medications and monitoring mental status   · Bree consult appreciated  Stopped Neurontin and listed as allergy as this makes her encephalopathy worse    · Pt's significant other concerned regarding some forgetfulness/memory loss issues and tremors of the bilateral hands, reports parkinsons runs in the patient's family  Recommended  neurology/cognitive testing if ongoing  ·  ct head stable  · Today sig other asked for her to be d/c home to his care  As pt has no acute medical issues being addressed and I suspect this will improve if she avoids Neurontin, I d/c pt home    Lumbar radiculopathy  Assessment & Plan  · Previous provider reviewed note from neuro surgery 08/04/2022  · Low back pain probably/likely facet joint related  Multiple admissions and ED visits for right-sided back pain that radiates to her legs, more on the right side  Prior MRI demonstrated moderately advanced degenerative lumbar spine disease at multi levels  L4-5 disc herniation more on the right with moderate central canal stenosis  MRI with questionable L4-L5 osteomyelitis/diskitis per neuro surgery and ID highly unlikely  Underwent bilateral L3, L4-L5 dorsal ramus block at [facility] in May 2022 with no improvement in symptoms  · Due to recent fall prior to her previous admission at the beginning of August CT of the right leg was ordered in the ED  No acute displaced fracture, mild osteoarthrosis  Possible cystitis   · Due to possible cystitis on CT scan, UA ordered and not yet obtained  Patient makes minimal urine  · Conservative measures with aqua k pad, bengay, Lyrica  · Continue decreased PRN oxycodone to 2 5 mg and PRN Robaxin, improvement in mentation noted  · AVOID IV MEDICATIONS PLEASE - DT restlessness has removed iv sites by accident   · PT/OT recommending STR which patient is in agreement with currently   · Pt reporting more pain in the right leg today, increase oxycodone to 2 5 mg Q6H, has not been utilizing her PRN oxycodone very often  · Monitor symptoms, Pt known to me  Upon entry to room pt is clearly uncomfortable   Hemo sessions are cut short as pt unable to tolerate due to pain ?    · Significant other reports that she is not taking pain meds or THC (he doesn't allow this in his home) He states pt was due for neurosx anahi this next week and has seen outpt acute pain   · Consult neurosx - appreciate discussion   · Plan for MRI w contrast but pt refused and RN did not give Ativan before MRI as ordered do MRI cancelled  · Will plan for OP f/u w/ Nsurg and OP MRI  · APS appreciated to help w/ pain mgmt  · Use Lidocaine TD and topl and Bengay  · Avoid Neurontin  · Scheduled tylenol  · Pt has not required oxycodone in 24h so no need to d/c on it    Elevated troponin  Assessment & Plan  · Suspect non MI troponin elevation secondary to hypertensive urgency on admission   · Also with underlying ESRD on HD  · Denies any chest pain palpitation or shortness of breath, EKG without acute ischemic changes    Constipation  Assessment & Plan  · Continue daily miralax and colace  · Monitor stool output, last BM documented on 8/31  · Added one time lactulose had small bm today     Pulmonary embolus (HCC)  Assessment & Plan  · History of DVT and PE 6 years ago  · On Coumadin, subtherapeutic on admission, questionable compliance, does not seem to know which medications she is taking at home   · Maintained on Coumadin 9 mg daily on PTA list   · INR 3 3 9/6 so hold coumadin again today    Pt advised to take 6 mg starting tomorrow and then f/u w/ PCP to get INR checked    Acquired hypothyroidism  Assessment & Plan  · Continue levothyroxine 50 mcg daily    Type 2 diabetes mellitus [facility])  Assessment & Plan  Lab Results   Component Value Date    HGBA1C 9 4 (H) 07/09/2022       Recent Labs     09/06/22  0511 09/06/22  1115 09/06/22  1729 09/06/22  2136   POCGLU 116 126 149* 207*       Blood Sugar Average: Last 72 hrs:  (P) 147 75   · On Lantus 12 units HS and NovoLog sliding scale at home   · Noted to have persistent hypoglycemia while hospitalized   · Lantus discontinued, continue PRN SSi coverage for now  · Endocrinology following, appreciate recommendations  · Encourage PO intake   · QID glucose checks  · Received 2 amps D50 8/31  · Diabetic diet  · Could be contributing to patient's overall presentation   · D/c on ISS    Morbid obesity  Assessment & Plan  · BMI 48  · RD consult    Anemia of chronic disease  Assessment & Plan  · Secondary to end-stage renal disease  · Hemoglobin at baseline    Esophageal reflux  Assessment & Plan  · Continue PPI    Anxiety disorder  Assessment & Plan  · Continue Xanax 0 25 mg b i d  p r n , hold for sedation   · Continue Zoloft 75 mg daily    ESRD on hemodialysis  Assessment & Plan  Lab Results   Component Value Date    EGFR 5 09/04/2022    EGFR 5 09/01/2022    EGFR 6 08/31/2022    CREATININE 7 51 (H) 09/04/2022    CREATININE 8 05 (H) 09/01/2022    CREATININE 6 46 (H) 08/31/2022   · On dialysis TTS  · Nephrology consulted for HD management,    Essential hypertension  Assessment & Plan  · Hypertensive urgency noted on admission, likely in setting of missing dose of coreg on day of admit   · Overall much improved, BP stabilized   · Continue Coreg 25 mg BID  · Torsemide and nifedipine were discontinued on previous admission 8/15 due to hypotension  · Norvasc added    Medical Problems             Resolved Problems  Date Reviewed: 9/6/2022   None               Discharging Physician / Practitioner: Bard Brody DO  PCP: Deshawn Sharma MD  Admission Date:   Admission Orders (From admission, onward)     Ordered        08/31/22 1515  Inpatient Admission  Once            08/30/22 1358  Place in Observation  Once                      Discharge Date: 09/07/22    Consultations During Hospital Stay:  · Renal  · Endo  · Nsurg  · APS  · Bree    Procedures Performed:   · none    Significant Findings / Test Results:   · See above    Incidental Findings:   · none     Test Results Pending at Discharge (will require follow up):   · none     Outpatient Tests Requested:  · PCP should check INR later this week  · Nsurg will repeat MRI in next 30 days    Complications:  none    Reason for Admission: encephalopathy    Hospital Course:   Yossi Reynaga is a 56 y o  female patient who originally presented to the hospital on 8/30/2022 due to encephalopathy  Likely related to HD and hypoglycemia  Yesterday during HD pt agitated and was combative as RN trying to hold pressure as bleeding from HD site  Pt had intractable back pain but refused MRI  Suspect encephalopathy worsened by Neurontin and Robaxin - both stopped  Today pt and sig other asked for d/c  I allowed for d/c to care of sig other  Sig other said he would take her to [facility] although I advised that she may not get admitted there as no acute issues  For back pain, will use tylenol, Lido TD and topl, and Bengay  No oxy required for last 24h  Set up w/ home services  Please see above list of diagnoses and related plan for additional information  Condition at Discharge: stable    Discharge Day Visit / Exam:   Subjective:  Pt asked to be d/c home  Vitals: Blood Pressure: 152/70 (09/07/22 0009)  Pulse: 85 (09/06/22 1122)  Temperature: 97 8 °F (36 6 °C) (09/07/22 0009)  Temp Source: Oral (09/06/22 0825)  Respirations: 18 (09/06/22 1100)  SpO2: (!) 79 % (09/04/22 2000)  Exam:   Physical Exam  HENT:      Head: Normocephalic and atraumatic  Nose: Nose normal       Mouth/Throat:      Mouth: Mucous membranes are moist    Eyes:      Extraocular Movements: Extraocular movements intact  Conjunctiva/sclera: Conjunctivae normal    Cardiovascular:      Rate and Rhythm: Normal rate and regular rhythm  Pulmonary:      Effort: Pulmonary effort is normal       Breath sounds: Normal breath sounds  Abdominal:      General: Bowel sounds are normal       Palpations: Abdomen is soft  Musculoskeletal:         General: Normal range of motion  Cervical back: Normal range of motion and neck supple  Right lower leg: No edema  Left lower leg: No edema  Skin:     General: Skin is warm and dry     Neurological:      Mental Status: She is alert and oriented to person, place, and time  Discussion with Family: Updated  (significant other) at bedside  Discharge instructions/Information to patient and family:   See after visit summary for information provided to patient and family  Provisions for Follow-Up Care:  See after visit summary for information related to follow-up care and any pertinent home health orders  Disposition:   Home with [facility]NA Services (Reminder: Complete face to face encounter)    Planned Readmission: no     Discharge Statement:  I spent 40 minutes discharging the patient  This time was spent on the day of discharge  I had direct contact with the patient on the day of discharge  Greater than 50% of the total time was spent examining patient, answering all patient questions, arranging and discussing plan of care with patient as well as directly providing post-discharge instructions  Additional time then spent on discharge activities  Discharge Medications:  See after visit summary for reconciled discharge medications provided to patient and/or family        **Please Note: This note may have been constructed using a voice recognition system**

## 2022-09-07 NOTE — PROGRESS NOTES
Patient refused her medications tonight, threw them at the nurse along with applesauce  She appears paranoid, questioning everyones moves and intentions  I had to cancel the MRI scheduled for 0400, due to her inabliltiy to sit still  She is still awake and in a chair  I contacted HARISH Laguna to advise

## 2022-09-07 NOTE — ASSESSMENT & PLAN NOTE
Lab Results   Component Value Date    HGBA1C 9 4 (H) 07/09/2022       Recent Labs     09/06/22  0511 09/06/22  1115 09/06/22  1729 09/06/22  2136   POCGLU 116 126 149* 207*       Blood Sugar Average: Last 72 hrs:  (P) 147 75   · On Lantus 12 units HS and NovoLog sliding scale at home   · Noted to have persistent hypoglycemia while hospitalized   · Lantus discontinued, continue PRN SSi coverage for now  · Endocrinology following, appreciate recommendations  · Encourage PO intake   · QID glucose checks  · Received 2 amps D50 8/31  · Diabetic diet  · Could be contributing to patient's overall presentation   · D/c on ISS

## 2022-09-07 NOTE — ASSESSMENT & PLAN NOTE
· Previous provider reviewed note from neuro surgery 08/04/2022  · Low back pain probably/likely facet joint related  Multiple admissions and ED visits for right-sided back pain that radiates to her legs, more on the right side  Prior MRI demonstrated moderately advanced degenerative lumbar spine disease at multi levels  L4-5 disc herniation more on the right with moderate central canal stenosis  MRI with questionable L4-L5 osteomyelitis/diskitis per neuro surgery and ID highly unlikely  Underwent bilateral L3, L4-L5 dorsal ramus block at [facility] in May 2022 with no improvement in symptoms  · Due to recent fall prior to her previous admission at the beginning of August CT of the right leg was ordered in the ED  No acute displaced fracture, mild osteoarthrosis  Possible cystitis   · Due to possible cystitis on CT scan, UA ordered and not yet obtained  Patient makes minimal urine  · Conservative measures with aqua k pad, bengay, Lyrica  · Continue decreased PRN oxycodone to 2 5 mg and PRN Robaxin, improvement in mentation noted  · AVOID IV MEDICATIONS PLEASE - DT restlessness has removed iv sites by accident   · PT/OT recommending STR which patient is in agreement with currently   · Pt reporting more pain in the right leg today, increase oxycodone to 2 5 mg Q6H, has not been utilizing her PRN oxycodone very often  · Monitor symptoms, Pt known to me  Upon entry to room pt is clearly uncomfortable   Hemo sessions are cut short as pt unable to tolerate due to pain ? · Significant other reports that she is not taking pain meds or THC (he doesn't allow this in his home) He states pt was due for neurosx anahi this next week and has seen outpt acute pain   · Consult neurosx - appreciate discussion   · Plan for MRI w contrast but pt refused and RN did not give Ativan before MRI as ordered do MRI cancelled  · Will plan for OP f/u w/ Nsurg and OP MRI    · APS appreciated to help w/ pain mgmt  · Use Lidocaine TD and topl and Bengay  · Avoid Neurontin  · Scheduled tylenol  · Pt has not required oxycodone in 24h so no need to d/c on it

## 2022-09-07 NOTE — PROGRESS NOTES
NEPHROLOGY PROGRESS NOTE   Ary Miller 56 y o  female MRN: [id number]  Unit/Bed#: CW2 210-01 Encounter: [id number]  Reason for Consult: ESRD    ASSESSMENT AND PLAN:  ESRD on HD on TTS schedule [address]  -shortened dialysis treatment yesterday by 30 minutes due to patient being very agitated, uncooperative and screaming  Do not have post dialysis weight available  -outpatient dry weight 124 kg  Patient was below dry weight yesterday  1 9 L UF removal   -unfortunately MRI could not be done earlier today due to significant agitation, no need for dialysis given MRI is canceled   -given her significant agitations, not cooperating with taking medications, vitals, not participating and allowing to do physical exam, I am concerned for her to have dialysis safely   -appreciate geriatric evaluation,? Need for psych evaluation     Altered mental status, agitations  further management as per primary team, geriatric  -patient has been refusing to cooperate in physical exam, refusing to take medications  She does not want anybody to come closer to her  -? Concern for polypharmacy     CKD anemia, not on Epogen due to history of PE  -transfuse p r n  for hemoglobin less than seven     Hypertension  -BP fluctuating with high readings noted  Recently started amlodipine, continue Coreg 25 mg p o  B i d   -suspect pain and agitations contributing as well     CKD BMD, continue to monitor with renal diet, binders     Access, upper extremity AV access with bruit and thrill present     Lumbar radiculopathy, right-sided lower back pain with leg pain   -unfortunately MRI needed to be canceled due to patient's agitations and unable to cooperate     Discussed above plan in detail with primary team     SUBJECTIVE:  Patient seen at bedside  She denies any nausea vomiting  Has been very agitated and uncooperative overnight as per discussion with the floor nurse      OBJECTIVE:  Current Weight:    Vitals:    09/07/22 0009   BP: 152/70 Pulse:    Resp:    Temp: 97 8 °F (36 6 °C)   SpO2:        Intake/Output Summary (Last 24 hours) at 9/7/2022 0836  Last data filed at 9/6/2022 1700  Gross per 24 hour   Intake 420 ml   Output 1963 ml   Net -1543 ml     Wt Readings from Last 3 Encounters:   08/12/22 127 kg (280 lb)   07/29/22 130 kg (286 lb 9 6 oz)   07/09/22 130 kg (285 lb 11 5 oz)     Temp Readings from Last 3 Encounters:   09/07/22 97 8 °F (36 6 °C)   08/23/22 (!) 95 5 °F (35 3 °C)   08/21/22 98 °F (36 7 °C)     BP Readings from Last 3 Encounters:   09/07/22 152/70   08/29/22 150/80   08/23/22 142/88     Pulse Readings from Last 3 Encounters:   09/06/22 85   08/23/22 88   08/20/22 75        Physical Examination:  Physical examination remains extremely limited  Patient is active, alert, oriented at the time of my encounter  She remains overall agitated, and not cooperative  She refuses to have anybody come closer to her  She refuses to participate in physical exam   Unable to do physical exam due to above  She seems paranoid      Medications:    Current Facility-Administered Medications:     acetaminophen (TYLENOL) tablet 975 mg, 975 mg, Oral, Q8H [facility], Lottie Oliveira MD, 975 mg at 09/06/22 2141    albuterol (PROVENTIL HFA,VENTOLIN HFA) inhaler 2 puff, 2 puff, Inhalation, Q6H PRN, Clari Nguyễn MD    [facility] tablet 0 25 mg, 0 25 mg, Oral, BID PRN, Clari Nguyễn MD, 0 25 mg at 09/02/22 2234    amLODIPine (NORVASC) tablet 5 mg, 5 mg, Oral, Daily, Luis Vargas MD, 5 mg at 09/06/22 1237    atorvastatin (LIPITOR) tablet 20 mg, 20 mg, Oral, Daily With Dipesh Olson MD, 20 mg at 09/06/22 1809    b complex-vitamin C-folic acid (NEPHROCAPS) capsule 1 capsule, 1 capsule, Oral, Daily With Dipesh Olson MD, 1 capsule at 09/06/22 1810    calcitriol (ROCALTROL) capsule 0 25 mcg, 0 25 mcg, Oral, Once per day on Tue Thu Sat, LOIS Connell, 0 25 mcg at 09/06/22 1226    carvedilol (COREG) tablet 25 mg, 25 mg, Oral, BID, Allen Guthrie MD, 25 mg at 09/06/22 1810    docusate sodium (COLACE) capsule 100 mg, 100 mg, Oral, BID, Denis Luis PA-C, 100 mg at 09/06/22 1810    hydrALAZINE (APRESOLINE) injection 10 mg, 10 mg, Intravenous, Q6H PRN, Fan Narayan DO    hydrOXYzine HCL (ATARAX) tablet 25 mg, 25 mg, Oral, Q6H PRN, Allen Guthrie MD, 25 mg at 09/04/22 1026    iron sucrose (VENOFER) 50 mg in sodium chloride 0 9 % 100 mL IVPB, 50 mg, Intravenous, After Dialysis, Deb King MD    levothyroxine tablet 50 mcg, 50 mcg, Oral, Early Morning, Lottie Oliveira MD, 50 mcg at 09/06/22 0502    lidocaine (LIDODERM) 5 % patch 2 patch, 2 patch, Topical, Daily, Fan Narayan DO    loratadine (CLARITIN) tablet 5 mg, 5 mg, Oral, Daily, Lottie Oliveira MD, 5 mg at 09/06/22 0811    LORazepam (ATIVAN) injection 1 mg, 1 mg, Intravenous, Q30 Min PRN, Fan Narayan DO    melatonin tablet 3 mg, 3 mg, Oral, HS, Lottie Oliveira MD, 3 mg at 09/05/22 2117    menthol-methyl salicylate (BENGAY) 58-57 % cream, , Apply externally, 4x Daily PRN, Allen Guthrie MD, Given at 09/01/22 2205    nystatin (MYCOSTATIN) powder, , Topical, BID, Fan Narayan,     OLANZapine (ZyPREXA) IM injection 2 5 mg, 2 5 mg, Intramuscular, Once, Fan Narayan DO    ondansetron (ZOFRAN-ODT) dispersible tablet 4 mg, 4 mg, Oral, Q6H PRN, LOIS Ramesh, 4 mg at 09/03/22 1400    oxyCODONE (ROXICODONE) IR tablet 2 5 mg, 2 5 mg, Oral, Q6H PRN, Denis Luis PA-C, 2 5 mg at 09/06/22 0507    pantoprazole (PROTONIX) EC tablet 40 mg, 40 mg, Oral, BID, Lottie Oliveira MD, 40 mg at 09/06/22 1810    polyethylene glycol (MIRALAX) packet 17 g, 17 g, Oral, Daily, Denis Luis PA-C, 17 g at 09/06/22 7132    pregabalin (LYRICA) capsule 50 mg, 50 mg, Oral, Daily, Lottie Oliveira MD, 50 mg at 09/06/22 0811    senna (SENOKOT) tablet 17 2 mg, 2 tablet, Oral, HS PRN, Allen Guthrie MD, 17 2 mg at 09/03/22 2116   sertraline (ZOLOFT) tablet 75 mg, 75 mg, Oral, Daily, Alejandro Mitchell MD, 75 mg at 09/06/22 0811    sevelamer (RENAGEL) tablet 1,600 mg, 1,600 mg, Oral, TID With Meals, Alejandro Mitchell MD, 1,600 mg at 09/06/22 1811    Laboratory Results:  Results from last 7 days   Lab Units 09/04/22  1914 09/03/22  0927 09/01/22  0822 08/31/22  1022   WBC Thousand/uL 4 25* 4 62  --   --    HEMOGLOBIN g/dL 7 6* 8 0*  --   --    HEMATOCRIT % 24 1* 25 4*  --   --    PLATELETS Thousands/uL 139* 160  --   --    SODIUM mmol/L 135  --  137 135   POTASSIUM mmol/L 4 9  --  4 4 3 8   CHLORIDE mmol/L 100  --  101 100   CO2 mmol/L 33*  --  32 31   BUN mg/dL 32*  --  28* 19   CREATININE mg/dL 7 51*  --  8 05* 6 46*   CALCIUM mg/dL 8 6  --  8 8 9 1   MAGNESIUM mg/dL  --   --   --  2 3       CT head without contrast   Final Result by Pérez Mayer DO (08/30 1311)   No acute intracranial abnormality  Workstation performed: [id number]         CT lower extremity wo contrast right   Final Result by Wiliam Seo MD (08/30 1316)      No acute displaced fracture  Mild osteoarthrosis  Possible cystitis  Workstation performed: [id number]         MRI lumbar spine w wo contrast    (Results Pending)       Portions of the record may have been created with voice recognition software  Occasional wrong word or "sound a like" substitutions may have occurred due to the inherent limitations of voice recognition software  Read the chart carefully and recognize, using context, where substitutions have occurred

## 2022-09-07 NOTE — CASE MANAGEMENT
Case Management Discharge Planning Note    Patient name Nikky Mills  Location 2 210/CW2 937-50 MRN [id number]  : 1965 Date 2022       Current Admission Date: 2022  Current Admission Diagnosis:Toxic metabolic encephalopathy   Patient Active Problem List    Diagnosis Date Noted    Toxic metabolic encephalopathy     Elevated troponin 2022    Intractable back pain 2022    Problem with vascular access 2022    Concern for Osteomyelitis/Discitis of lumbar region 2022    Fall 2022    Hypoxia 2022    COVID-19 2022    Right knee pain 2022    Constipation 2022    Pruritus 2021    Postop check 2021    Sigmoid diverticulitis 2021    Pneumonia 2021    Chronic anticoagulation 2021    Arteriovenous fistula for hemodialysis in place, primary ([facility] ) 2021    Healthcare-associated pneumonia 2021    Right foot pain 2021    A-V fistula ([facility] ) 2021    Chronic pain syndrome 2021    Bilateral primary osteoarthritis of knee 2021    Bilateral patellofemoral syndrome 2021    Tinea unguium 2020    S/P foot surgery, right 2020    History of claustrophobia 2020    Morbid obesity 2020    Hyperlipidemia 2020    Diabetic polyneuropathy associated with type 2 diabetes mellitus ([facility] ) 2020    Encounter for diabetic foot exam ([facility] ) 2020    Corns and callosities 2020    History of transmetatarsal amputation of right foot ([facility] ) 2020    Flu-like symptoms 2020    Lumbar radiculopathy 2020    Low back pain with sciatica 2020    Hemodialysis-associated hypotension 2019    Hyponatremia 2019    Parenchymal renal hypertension 2019    Candidiasis of breast 2019    Hyperkalemia 2019    Anemia of chronic disease 2019    Disruption of internal surgical wound Observation Goals:  1. Diagnostic tests and consults completed and resulted Met  2. Vital signs normal or at patient baseline Met  3. Determine and initiate anti-epileptic drug regimen and establish follow up not met    Per care coordinator and provider, pt to discharge tomorrow morning in order to maintain outpatient neurology appointment.      08/26/2019    Dyspnea 05/17/2019    Secondary hyperparathyroidism of renal origin ([facility] ) 04/27/2019    Nausea and vomiting 04/26/2019    Meningioma ([facility] ) 04/18/2019    Concussion without loss of consciousness 03/15/2019    Colon cancer screening 03/05/2019    Gastroesophageal reflux disease 03/05/2019    Primary osteoarthritis of right knee 10/25/2018    Hemodialysis status ([facility] ) 10/23/2018    Knee pain 10/22/2018    Anxiety disorder 04/06/2017    Acquired hypothyroidism 07/22/2016    Glaucoma 07/01/2016    ESRD on hemodialysis 07/01/2016    Abnormal uterine bleeding 02/15/2016    History of venous thromboembolism 02/14/2016    Pulmonary embolus ([place] [address] ) 10/30/2015    Cervical dysplasia 05/03/2015    Chronic endometritis 10/17/2014    Tinea pedis 10/02/2014    Benign neoplasm of skin 09/25/2014    Type 2 diabetes mellitus ([place] [address] ) 09/04/2014    Phlebitis and thrombophlebitis of superficial vessels of lower extremities 04/10/2014    Limb pain 11/25/2013    Mononeuritis of upper limb, unspecified laterality 11/25/2013    Legal blindness, as defined in [place] [facility] 83/26/0695    Complication from renal dialysis device 04/22/2013    Essential hypertension 10/15/2012    Cardiomyopathy ([place] [address] ) 09/26/2012    Esophageal reflux 08/20/2012    Allergic rhinitis 08/20/2012      LOS (days): 7  Geometric Mean LOS (GMLOS) (days): 3 80  Days to GMLOS:-3     OBJECTIVE:  Risk of Unplanned Readmission Score: 67 32         Current admission status: Inpatient   Preferred Pharmacy:   [id number] - [address]  [address] [facility] [address]  Phone: [phone] Fax: [phone] [facility], [address]  [address] [address]  Phone: [phone] Fax: [phone]    Primary Care Provider: Nam Huffman MD    Primary Insurance: MEDICARE  Secondary Insurance: [address] [place] [address] [address] [employer]    DISCHARGE DETAILS:    Discharge planning discussed with[de-identified] patient and spouse  Freedom of Choice: Yes     IMM Given (Date):: 09/07/22  IMM Given to[de-identified] Patient     Additional Comments: Informed by Dr Kalina Mariee that patient requests dc home today and spouse is in room  Patient accepted by [facility] and message was sent to them about dc today  Met with patient and spouse to confirm plan  Spouse will transport to HD at [facility]  Message in [address] was sent to [facility] about plan for dc home

## 2022-09-07 NOTE — ASSESSMENT & PLAN NOTE
Lab Results   Component Value Date    EGFR 5 09/04/2022    EGFR 5 09/01/2022    EGFR 6 08/31/2022    CREATININE 7 51 (H) 09/04/2022    CREATININE 8 05 (H) 09/01/2022    CREATININE 6 46 (H) 08/31/2022   · On dialysis TTS  · Nephrology consulted for HD management,

## 2022-09-07 NOTE — ASSESSMENT & PLAN NOTE
· History of DVT and PE 6 years ago  · On Coumadin, subtherapeutic on admission, questionable compliance, does not seem to know which medications she is taking at home   · Maintained on Coumadin 9 mg daily on PTA list   · INR 3 3 9/6 so hold coumadin again today    Pt advised to take 6 mg starting tomorrow and then f/u w/ PCP to get INR checked

## 2022-09-07 NOTE — ASSESSMENT & PLAN NOTE
· Hypertensive urgency noted on admission, likely in setting of missing dose of coreg on day of admit   · Overall much improved, BP stabilized   · Continue Coreg 25 mg BID  · Torsemide and nifedipine were discontinued on previous admission 8/15 due to hypotension  · Norvasc added

## 2022-09-07 NOTE — PLAN OF CARE
Problem: INFECTION - ADULT  Goal: Absence or prevention of progression during hospitalization  Description: INTERVENTIONS:  - Assess and monitor for signs and symptoms of infection  - Monitor lab/diagnostic results  - Monitor all insertion sites, i e  indwelling lines, tubes, and drains  - Monitor endotracheal if appropriate and nasal secretions for changes in amount and color  - [place] appropriate cooling/warming therapies per order  - Administer medications as ordered  - Instruct and encourage patient and family to use good hand hygiene technique  - Identify and instruct in appropriate isolation precautions for identified infection/condition  Outcome: Adequate for Discharge  Goal: Absence of fever/infection during neutropenic period  Description: INTERVENTIONS:  - Monitor WBC    Outcome: Adequate for Discharge     Problem: SAFETY ADULT  Goal: Maintain or return to baseline ADL function  Description: INTERVENTIONS:  -  Assess patient's ability to carry out ADLs; assess patient's baseline for ADL function and identify physical deficits which impact ability to perform ADLs (bathing, care of mouth/teeth, toileting, grooming, dressing, etc )  - Assess/evaluate cause of self-care deficits   - Assess range of motion  - Assess patient's mobility; develop plan if impaired  - Assess patient's need for assistive devices and provide as appropriate  - Encourage maximum independence but intervene and supervise when necessary  - Involve family in performance of ADLs  - Assess for home care needs following discharge   - Consider OT consult to assist with ADL evaluation and planning for discharge  - Provide patient education as appropriate  Outcome: Adequate for Discharge  Goal: Maintains/Returns to pre admission functional level  Description: INTERVENTIONS:  - Perform BMAT or MOVE assessment daily    - Set and communicate daily mobility goal to care team and patient/family/caregiver     - Collaborate with rehabilitation services on mobility goals if consulted  - Perform Range of Motion      Problem: INFECTION - ADULT  Goal: Absence or prevention of progression during hospitalization  Description: INTERVENTIONS:  - Assess and monitor for signs and symptoms of infection  - Monitor lab/diagnostic results  - Monitor all insertion sites, i e  indwelling lines, tubes, and drains  - Monitor endotracheal if appropriate and nasal secretions for changes in amount and color  - [place] appropriate cooling/warming therapies per order  - Administer medications as ordered  - Instruct and encourage patient and family to use good hand hygiene technique  - Identify and instruct in appropriate isolation precautions for identified infection/condition  Outcome: Adequate for Discharge  Goal: Absence of fever/infection during neutropenic period  Description: INTERVENTIONS:  - Monitor WBC    Outcome: Adequate for Discharge     Problem: DISCHARGE PLANNING  Goal: Discharge to home or other facility with appropriate resources  Description: INTERVENTIONS:  - Identify barriers to discharge w/patient and caregiver  - Arrange for needed discharge resources and transportation as appropriate  - Identify discharge learning needs (meds, wound care, etc )  - Arrange for interpretive services to assist at discharge as needed  - Refer to Case Management Department for coordinating discharge planning if the patient needs post-hospital services based on physician/advanced practitioner order or complex needs related to functional status, cognitive ability, or social support system  Outcome: Adequate for Discharge     Problem: Knowledge Deficit  Goal: Patient/family/caregiver demonstrates understanding of disease process, treatment plan, medications, and discharge instructions  Description: Complete learning assessment and assess knowledge base    Interventions:  - Provide teaching at level of understanding  - Provide teaching via preferred learning methods  Outcome: Adequate for Discharge     - Out of bed for toileting  - Record patient progress and toleration of activity level   Outcome: Adequate for Discharge  Goal: Patient will remain free of falls  Description: INTERVENTIONS:  - Educate patient/family on patient safety including physical limitations  - Instruct patient to call for assistance with activity   - Consult OT/PT to assist with strengthening/mobility   - Keep Call bell within reach  - Keep bed low and locked with side rails adjusted as appropriate  - Keep care items and personal belongings within reach  - Initiate and maintain comfort rounds  - Make Fall Risk Sign visible to staff  - Offer [address] [place] [address] necessary fall risk management equipmen   - Apply yellow socks and bracelet for high fall risk patients  - Consider moving patient to room near nurses station  Outcome: Adequate for Discharge     Problem: PAIN - ADULT  Goal: Verbalizes/displays adequate comfort level or baseline comfort level  Description: Interventions:  - Encourage patient to monitor pain and request assistance  - Assess pain using appropriate pain scale  - Administer analgesics based on type and severity of pain and evaluate response  - Implement non-pharmacological measures as appropriate and evaluate response  - Consider cultural and social influences on pain and pain management  - Notify physician/advanced practitioner if interventions unsuccessful or patient reports new pain  Outcome: Adequate for Discharge

## 2022-09-07 NOTE — ASSESSMENT & PLAN NOTE
· Patient was in her usual state of health, after finishing dialysis on day of admission when she had an episode of decreased level of consciousness  Patient states she was able to hear nurses calling her name but she could not answer  Per EMS no seizure-like activities and shortly she was back at her baseline mentation  She had a sharp right-sided back pain which she has been dealing with since last year  Per ED, it was not reported that she was hypotensive during this episode  · Unclear cause  ?  Transient hypotension which she had in the past during dialysis  Less likely seizure, no seizure-like activities reported per facility, and shortly she was back at her baseline mentation  Possibly pain related vs  Related to hypoglycemia   · Nonfocal neurologic exam  ·  this is waxing and waning - would consider MRI however now pending MRI lumbar spine pt with little ability to sustain position for longer period of time therefore   · CT head negative for acute pathology - consider MRI if ongoing concern  · Telemetry discontinued as pt taking leads off, no events noted   · Pt with persistent hypoglycemia here, endocrinology consulted, lantus discontinued, SSI coverage PRN with improvement and improvement in mentation as well   · Likely metabolic/toxic encephalopathy in setting of hypoglycemia, sedating medications, adjusting medications and monitoring mental status   · Bree consult appreciated  Stopped Neurontin and listed as allergy as this makes her encephalopathy worse  · Pt's significant other concerned regarding some forgetfulness/memory loss issues and tremors of the bilateral hands, reports parkinsons runs in the patient's family  Recommended  neurology/cognitive testing if ongoing  ·  ct head stable  · Today sig other asked for her to be d/c home to his care    As pt has no acute medical issues being addressed and I suspect this will improve if she avoids Neurontin, I d/c pt home

## 2022-09-07 NOTE — NURSING NOTE
Patient with increasing confusion and combative overnight  Increased paranoia  Sig other yuki would like to take her home  IV removed  Dr Gabriel Lazcano states patient is okay for discharge and to stop gabapentin for same reason  Sig other aware not to use the gabapentin and to follow with neurosurgery outpatient  Patient strongly encouraged to seek medical assist if not able control confusion lethargy or if patient becomes harm to herself  Yuki verbalized understanding of same  Discharge reviewed   Patient discharged per request

## 2022-09-07 NOTE — QUICK NOTE
Was reached out by internal medicine in regards to patient, she was agitated this morning and unable to stay still and did not have MRI done for this reason  Her  would like to take her home and they refused MRI lumbar spine  Patient can follow up in outpatient setting in next few weeks, to reassess symptoms and see if repeat MRI needed  Patient can continue conservative management  Neurosurgery will sign off, call with any further questions or concerns

## 2022-09-08 ENCOUNTER — HOME CARE VISIT (OUTPATIENT)
Dept: HOME HEALTH SERVICES | Facility: HOME HEALTHCARE | Age: 57
End: 2022-09-08
Payer: MEDICARE

## 2022-09-08 NOTE — CASE COMMUNICATION
Back office please fax to PCP Mickey Current Fax: [phone]     Per clinical coordinators request visit made as refused visit in order to ensure patient did in fact go to the ED

## 2022-09-08 NOTE — CASE COMMUNICATION
Back office please fax to PCP Charissa Kraus MD  Notification of Discharge after Transfer    [facility]USH has assessed your patient for Home Health services and has determined the patient is not eligible for service due to the following: Needs beyond [facility]  Arrived at scheduled visit time  Surjit, patients  met RN outside home reporting about to take patient to [facility] due to unable to get patient out of bed  Patient refus es to return to [address] RN       Fax: [phone]

## 2022-09-09 NOTE — CASE COMMUNICATION
Jacinta Soriano  56year old Female  MRN: [id number]  9/8/2022 Patient Refused/Not at Home Provider: Rey Rojo RN Department: [facility]          Visit Information    Encounter Information    Provider Location   9/8/2022 12:00 PM Rey Rojo RN [facility] [address]   Notice of discharge

## 2022-10-05 ENCOUNTER — TELEPHONE (OUTPATIENT)
Dept: OTHER | Facility: HOSPITAL | Age: 57
End: 2022-10-05

## 2022-10-05 NOTE — TELEPHONE ENCOUNTER
A patient is still requesting to come off of dialysis early during the dialysis treatments at [facility]  Primary team is attempted to address frequently  But patient still request to come off treatment due to anxiety pain  Patient's weight is rising  At some point, we reviewed reach a point where the patient has to go back into the hospital due to volume overload  I have reviewed with primary team attending    If the patient is discharged on Friday, she can get dialysis Friday at the [facility] and then also on Saturday at the outpatient unit

## 2022-10-12 PROBLEM — J18.9 PNEUMONIA: Status: RESOLVED | Noted: 2021-06-27 | Resolved: 2022-10-12

## 2022-10-12 PROBLEM — J18.9 HEALTHCARE-ASSOCIATED PNEUMONIA: Status: RESOLVED | Noted: 2021-06-23 | Resolved: 2022-10-12

## 2022-10-18 ENCOUNTER — NURSING HOME VISIT (OUTPATIENT)
Dept: WOUND CARE | Facility: HOSPITAL | Age: 57
End: 2022-10-18
Payer: MEDICARE

## 2022-10-18 DIAGNOSIS — L89.220 PRESSURE INJURY OF LEFT HIP, UNSTAGEABLE (HCC): Primary | ICD-10-CM

## 2022-10-18 DIAGNOSIS — Z91.199 NON-COMPLIANCE: ICD-10-CM

## 2022-10-18 PROCEDURE — 99307 SBSQ NF CARE SF MDM 10: CPT | Performed by: NURSE PRACTITIONER

## 2022-10-18 NOTE — ASSESSMENT & PLAN NOTE
Patient still sleeping on her left side and causing worsening of the pressure ulcer on the left hip  Education provided  Patient verbalized understanding patient also aware that the wound can worsen if she keeps on sleeping on her left side

## 2022-10-18 NOTE — PROGRESS NOTES
[address] MANAGEMENT   AND [facility]PER[facility]INE [facility]ENTER       Patient ID: Yesenia Mccollum is a 56 y o  female Date of Birth 1965     Location of Service: [facility]    Performed wound round with: Wound team     Chief Complaint : Left hip    Wound Instructions:  Wound:  Left hip  Discontinue previous wound order  Cleanse the wound bed with NSS   Apply non-sting skin prep to periwound area  Apply Queen Sorb gel to wound bed, then cover with border foam  Frequency : daily and prn for soiling  Offload all wounds  Turn and reposition frequently, maximum of every two hours  Instruct / Assist with weight shifting every 15 - 20 minutes when in chair  Increase protein intake  Monitor for any sign of infection or worsening, inform PCP or patient's primary physician in your facility  Allergies  Iodinated diagnostic agents, Keflex [cephalexin], Neurontin [gabapentin], and Wound dressing adhesive      Assessment & Plan:  1  Pressure injury of left hip, unstageable ([address] )  Assessment & Plan:  Patient have 2 wounds on the left hip  - the wound on the proximal area is covered with 100% necrotic tissue, 1 x 0 5 cm  This is consider as unstable pressure ulcer since the deepest wound bed is not visible  - the wound on the distal area is 100% granulation, 2 5 x 1 cm  This is consider as stage III pressure ulcer   - local wound care with Omelia Rubins Sorb gel  The wound is dry and need moisture  - continued offload  Provided education to patient to not sleep on her left side  - followup next week      2  Non-compliance  Assessment & Plan:  Patient still sleeping on her left side and causing worsening of the pressure ulcer on the left hip  Education provided  Patient verbalized understanding patient also aware that the wound can worsen if she keeps on sleeping on her left side  Subjective:   10/18/2022This is a 56 y o , female referred to our service for wound/ skin alterations on left hip  Patient have a complex medical history including but not limited to  pmhpnn   Patient was referred by [address] was seen in collaboration with the facility wound team      Wound History: The wound on the left hip is prior to admission to skilled nursing facility  Received patient in bed, seems comfortable  Patient is ambulatory, can transfer independently  As per patient, she had been sleeping on her left side  Staff reported also that the patient had been scratching her wound on the left hip, patient denies  Review of Systems   Constitutional: Negative  HENT: Negative  Eyes: Negative  Respiratory: Negative  Cardiovascular: Negative for chest pain and leg swelling  Gastrointestinal: Negative  Endocrine: Negative  Genitourinary: Negative  Musculoskeletal: Positive for gait problem  Skin: Positive for wound  See HPI   Neurological: Negative for dizziness and headaches  Psychiatric/Behavioral: Negative for behavioral problems and confusion  Objective:    Physical Exam  Constitutional:       Appearance: She is obese  HENT:      Head: Normocephalic and atraumatic  Nose: Nose normal       Mouth/Throat:      Pharynx: Oropharynx is clear  Eyes:      Conjunctiva/sclera: Conjunctivae normal    Cardiovascular:      Rate and Rhythm: Normal rate  Pulmonary:      Effort: Pulmonary effort is normal    Abdominal:      Tenderness: There is no abdominal tenderness  There is no guarding  Genitourinary:     Comments: continent  Musculoskeletal:         General: No tenderness  Cervical back: Normal range of motion  Right lower leg: No edema  Left lower leg: No edema  Comments: LROM   Skin:     Findings: Lesion present        Comments: Left hip proximal - wound size is 1 x 0 5 x 0 1 cm , 50% slough, 50% eschar, no drainage, no obvious sign of infection, denies pain,periwound is normal    Left hip distal - wound size is 2 5 x 1 x 0 1 cm , 100% granulation, no drainage, periwound is normal, no obvious sign of infection, denies pain   Neurological:      Mental Status: She is alert  Gait: Gait abnormal    Psychiatric:         Mood and Affect: Mood normal          Behavior: Behavior normal               Procedures           Patient's care was coordinated with nursing facility staff  Recent vitals, labs and updated medications were reviewed on EMR or chart system of facility  Past Medical, surgical, social, medication and allergy history and patient's previous records were reviewed and updated as appropriate: Most up-to date information is available in the facility EMR where the patient is currently admitted      Patient Active Problem List   Diagnosis   • Essential hypertension   • History of venous thromboembolism   • Abnormal uterine bleeding   • Glaucoma   • ESRD on hemodialysis   • Anxiety disorder   • Knee pain   • Hemodialysis status ([place] [address] )   • Primary osteoarthritis of right knee   • Esophageal reflux   • Colon cancer screening   • Gastroesophageal reflux disease   • Meningioma (HCC)   • Nausea and vomiting   • Secondary hyperparathyroidism of renal origin ([place] [address] )   • Dyspnea   • Hyperkalemia   • Anemia of chronic disease   • Disruption of internal surgical wound   • Hyponatremia   • Parenchymal renal hypertension   • Candidiasis of breast   • Hemodialysis-associated hypotension   • Lumbar radiculopathy   • Low back pain with sciatica   • Flu-like symptoms   • Diabetic polyneuropathy associated with type 2 diabetes mellitus ([place] [address] )   • Tinea pedis   • Encounter for diabetic foot exam ([place] [address] )   • Corns and callosities   • History of transmetatarsal amputation of right foot ([place] [address] )   • Morbid obesity   • Hyperlipidemia   • History of claustrophobia   • Allergic rhinitis   • Benign neoplasm of skin   • Cardiomyopathy ([place] [address] )   • Cervical dysplasia   • Chronic endometritis   • Complication from renal dialysis device   • Concussion without loss of consciousness   • Type 2 diabetes mellitus (HCC)   • Acquired hypothyroidism   • Legal blindness, as defined in [place] [facility]   • Limb pain   • Mononeuritis of upper limb, unspecified laterality   • Phlebitis and thrombophlebitis of superficial vessels of lower extremities   • Pulmonary embolus ([facility])   • S/P foot surgery, right   • Tinea unguium   • Bilateral primary osteoarthritis of knee   • Bilateral patellofemoral syndrome   • Chronic pain syndrome   • A-V fistula ([facility])   • Right foot pain   • Arteriovenous fistula for hemodialysis in place, primary [facility])   • Chronic anticoagulation   • Sigmoid diverticulitis   • Postop check   • Pruritus   • Constipation   • Right knee pain   • Fall   • Hypoxia   • COVID-19   • Problem with vascular access   • Concern for Osteomyelitis/Discitis of lumbar region   • Intractable back pain   • Toxic metabolic encephalopathy   • Elevated troponin   • Pressure injury of left hip, unstageable ([facility])   • Non-compliance     Past Medical History:   Diagnosis Date   • Abnormal uterine bleeding (AUB)    • Anemia    • Anxiety    • Arthritis    • Chronic kidney disease    • Claustrophobia    • Diabetes mellitus ([address] )    • Disease of thyroid gland    • DVT (deep venous thrombosis) ([address] )    • End stage kidney disease ([address] )    • Foot ulcer due to secondary DM ([address] )    • Hemodialysis patient ([address] )     Ansley Ching, Sat   • Hypertension    • 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[address] )     right eye   • Morbid obesity ([address] )    • Osteomyelitis of fifth toe of right foot ([address] )    • Panic attacks    • Pulmonary embolism ([facility])    • Reflux esophagitis    • Thrombophlebitis of saphenous vein    • Warfarin anticoagulation      Past Surgical History:   Procedure Laterality Date   • AMPUTATION      r 4th toe   • ARTERIOVENOUS GRAFT PLACEMENT     • CATARACT EXTRACTION Bilateral    • CERVICAL BIOPSY  W/ LOOP ELECTRODE EXCISION     •  SECTION     • DIALYSIS FISTULA CREATION     • DILATION AND CURETTAGE OF UTERUS     • ENDOMETRIAL ABLATION W/ NOVASURE     • EYE SURGERY     • HYSTERECTOMY      @ age 57 (complete)   • IR AV FISTULAGRAM/GRAFTOGRAM  10/11/2019   • IR AV FISTULAGRAM/GRAFTOGRAM  8/12/2020   • IR AV FISTULAGRAM/GRAFTOGRAM  12/23/2020   • IR NON-TUNNELED CENTRAL LINE PLACEMENT  6/29/2021   • IR NON-TUNNELED CENTRAL LINE PLACEMENT  10/4/2021   • OOPHORECTOMY Bilateral     @ age 57   • PERICARDIUM SURGERY     • LA AMPUTATION FOOT,TRANSMETATARSAL Right 12/26/2021    Procedure: AMPUTATION TRANSMETATARSAL (TMA);  Surgeon: Joann Oropeza DPM;  [facility];  Service: Podiatry   • LA AMPUTATION TOE,MT-P JT Right 5/28/2020    Procedure: AMPUTATION TOE- 5th;  Surgeon: Joann Oropeza DPM;  Location: [facility];  Service: Podiatry   • LA COLONOSCOPY FLX DX W/COLLJ SPEC WHEN PFRMD N/A 3/13/2019    Procedure: COLONOSCOPY;  Surgeon: Nkechi Lowe MD;  Location: [facility]; Service: Gastroenterology   • LA ESOPHAGOGASTRODUODENOSCOPY TRANSORAL DIAGNOSTIC N/A 3/13/2019    Procedure: ESOPHAGOGASTRODUODENOSCOPY (EGD); Surgeon: Nkechi Lowe MD;  Location: [facility];   Service: Gastroenterology   • LA LAPAROSCOPY W TOT HYSTERECT UTERUS 250 GRAM OR LESS N/A 2/16/2016    Procedure: HYSTERECTOMY LAPAROSCOPIC TOTAL ([address]), with bilateral salpingectomy;  Surgeon: Cielo Lipscomb DO;  [facility];  Service: Gynecology   • THROMBECTOMY / ARTERIOVENOUS GRAFT REVISION     • TOE SURGERY Right     removal of the 4th toe   • WOUND DEBRIDEMENT Right 10/8/2021    Procedure: R 1st MTPJ washout ;  Surgeon: Elvie Kohli DPM;  [facility];  Service: Podiatry     Social History     Socioeconomic History   • Marital status: Single     Spouse name: None   • Number of children: None   • Years of education: None   • Highest education level: None   Occupational History   • None   Tobacco Use   • Smoking status: Never Smoker   • Smokeless tobacco: Never Used   Vaping Use   • Vaping Use: Never used   Substance and Sexual Activity   • Alcohol use: Never     Alcohol/week: 0 0 standard drinks   • Drug use: No   • Sexual activity: Never     Partners: Male     Birth control/protection: Abstinence   Other Topics Concern   • None   Social History Narrative   • None     Social Determinants of Health     Financial Resource Strain: Not on file   Food Insecurity: No Food Insecurity   • Worried About Running Out of Food in the Last Year: Never true   • Ran Out of Food in the Last Year: Never true   Transportation Needs: No Transportation Needs   • Lack of Transportation (Medical): No   • Lack of Transportation (Non-Medical):  No   Physical Activity: Not on file   Stress: Not on file   Social Connections: Not on file   Intimate Partner Violence: Not on file   Housing Stability: Low Risk    • Unable to Pay for Housing in the Last Year: No   • Number of Places Lived in the Last Year: 1   • Unstable Housing in the Last Year: No        Current Outpatient Medications:   •  Accu-Chek Guide test strip, use to TEST BLOOD SUGAR two to three times a day, Disp: , Rfl:   •  Accu-Chek Softclix Lancets lancets, 3 (three) times a day Use to test blood sugar, Disp: , Rfl:   •  acetaminophen (TYLENOL) 325 mg tablet, Take 3 tablets (975 mg total) by mouth every 8 (eight) hours, Disp: , Rfl: 0  •  albuterol (ProAir HFA) 90 mcg/act inhaler, Inhale 2 puffs every 6 (six) hours as needed for wheezing or shortness of breath, Disp: 8 5 g, Rfl: 0  •  ALPRAZolam (XANAX) 0 25 mg tablet, Take 0 25 mg by mouth 2 (two) times a day as needed for anxiety, Disp: , Rfl:   •  ALPRAZolam (XANAX) 0 5 mg tablet, TAKE 2 AND 1/2 TABLETS DAILY IN DIVIDED DOSES AS DIRECTED , Disp: , Rfl:   •  amLODIPine (NORVASC) 5 mg tablet, Take 1 tablet (5 mg total) by mouth daily, Disp: 30 tablet, Rfl: 0  •  atorvastatin (LIPITOR) 20 mg tablet, Take 20 mg by mouth every evening , Disp: , Rfl: 0  •  B Complex-C-Folic Acid (Natalia-Denys) TABS, TAKE ONE TABLET BY MOUTH DAILY, Disp: 90 tablet, Rfl: 3  •  calcitriol (ROCALTROL) 0 25 mcg capsule, Take 1 capsule (0 25 mcg total) by mouth 3 (three) times a week With dialysis, Disp: 12 capsule, Rfl: 0  •  carvedilol (COREG) 25 mg tablet, TAKE ONE TABLET BY MOUTH TWICE A DAY, Disp: 180 tablet, Rfl: 7  •  dicyclomine (BENTYL) 10 mg capsule, TAKE 1 TABLET BY MOUTH EVERY 6 HOURS OR AS NEEDED FOR PAIN, Disp: , Rfl:   •  docusate sodium (COLACE) 100 mg capsule, Take 1 capsule (100 mg total) by mouth 2 (two) times a day, Disp: 60 capsule, Rfl: 0  •  KIONEX 15 GM/60ML suspension, Take by mouth Takes as a shake on dialysis days Tues-Thurs_Sat , Disp: , Rfl: 0  •  levothyroxine 50 mcg tablet, Take 50 mcg by mouth daily, Disp: , Rfl:   •  lidocaine (LIDODERM) 5 %, , Disp: , Rfl:   •  lidocaine (LIDODERM) 5 %, Apply 1 patch topically daily Remove & Discard patch within 12 hours or as directed by MD, Disp: 14 patch, Rfl: 0  •  Lidocaine 4 % OINT, Apply 1 application topically 4 (four) times a day as needed (pain), Disp: 50 g, Rfl: 0  •  loratadine (CLARITIN) 10 mg tablet, Take 10 mg by mouth daily, Disp: , Rfl:   •  melatonin 3 mg, Take 1 tablet (3 mg total) by mouth daily at bedtime, Disp: 30 tablet, Rfl: 0  •  menthol-methyl salicylate (BENGAY) 71-04 % cream, Apply topically 4 (four) times a day as needed (muscle soreness), Disp: , Rfl: 0  •  Multiple Vitamin (MULTIVITAMIN ADULT PO), Take 500 mg by mouth in the morning   Indications: one a day menapause multivitamin, Disp: , Rfl:   •  NOVOLOG FLEXPEN 100 units/mL SOPN, INJECT 1 TO 5 UNITS SUBCUTANEOUSLY THREE TIMES A DAY BEFORE MELAS AS PER SLIDING SCALE, Disp: , Rfl:   •  nystatin (MYCOSTATIN) powder, Apply topically 2 (two) times a day, Disp: 15 g, Rfl: 0  •  ondansetron (ZOFRAN-ODT) 8 mg disintegrating tablet, , Disp: , Rfl:   •  pantoprazole (PROTONIX) 40 mg tablet, take 1 tablet by mouth twice a day, Disp: , Rfl:   •  polyethylene glycol (MIRALAX) 17 g packet, Take 17 g by mouth daily, Disp: , Rfl: 0  •  pregabalin (LYRICA) 50 mg capsule, Take 1 PO daily, take 1 PO additionally directly have HD on HD days, Disp: 45 capsule, Rfl: 2  •  senna (SENOKOT) 8 6 mg, Take 2 tablets (17 2 mg total) by mouth daily at bedtime as needed for constipation, Disp: 30 each, Rfl: 0  •  sertraline (ZOLOFT) 50 mg tablet, Take 1 5 tablets (75 mg total) by mouth daily, Disp: , Rfl:   •  sevelamer (RENAGEL) 800 mg tablet, Take 2 tablets (1,600 mg total) by mouth 3 (three) times a day with meals, Disp: 30 tablet, Rfl: 0  •  urea (CARMOL) 40 %, APPLY TO AFFECTED AREA TOPICALLY EVERY DAY, Disp: 85 g, Rfl: 2  •  warfarin (COUMADIN) 3 mg tablet, Take 2 tablets (6 mg total) by mouth daily Take 6 mg starting 9/8  Call PCP to discuss getting INR checked this week, Disp: 10 tablet, Rfl: 0  Family History   Problem Relation Age of Onset   • Heart disease Family    • Diabetes Family    • Heart disease Mother    • Cancer Brother         neck   • Throat cancer Brother    • Ovarian cancer Maternal Aunt 40              Coordination of Care: Wound team aware of the treatment plan  Facility nurse will provide wound treatment and monitor the wound for any changes  Patient / Staff education : Patient / Staff was given education on sign of infection and pressure ulcer prevention  Patient/ Staff verbalized understanding     Follow up :  Next week    Voice-recognition software may have been used in the preparation of this document  Occasional wrong word or "sound-alike" substitutions may have occurred due to the inherent limitations of voice recognition software  Interpretation should be guided by context        Yael Angeles

## 2022-10-18 NOTE — ASSESSMENT & PLAN NOTE
Patient have 2 wounds on the left hip  - the wound on the proximal area is covered with 100% necrotic tissue, 1 x 0 5 cm  This is consider as unstable pressure ulcer since the deepest wound bed is not visible  - the wound on the distal area is 100% granulation, 2 5 x 1 cm  This is consider as stage III pressure ulcer   - local wound care with Russell uSdheers Sorb gel  The wound is dry and need moisture  - continued offload  Provided education to patient to not sleep on her left side    - followup next week

## 2022-10-21 ENCOUNTER — TELEPHONE (OUTPATIENT)
Dept: OTHER | Facility: HOSPITAL | Age: 57
End: 2022-10-21

## 2022-10-25 ENCOUNTER — TELEPHONE (OUTPATIENT)
Dept: NEUROSURGERY | Facility: CLINIC | Age: 57
End: 2022-10-25

## 2022-10-25 NOTE — TELEPHONE ENCOUNTER
Left xray reminder with pt  Mike Ivey on 10/20   LEFT ADDITIONAL APPT REMINDER WITH XRAY ON PATIENTS VM TODAY 10/25

## 2022-11-03 ENCOUNTER — TELEPHONE (OUTPATIENT)
Dept: NEUROSURGERY | Facility: CLINIC | Age: 57
End: 2022-11-03

## 2022-11-03 NOTE — TELEPHONE ENCOUNTER
11/03/2022-CALLED PT, LEFT MESSAGE ON MACHINE TO RESCHEDULE TODAY'S "NO SHOW" APT AND HAVE XRAY COMPLETED PRIOR

## 2022-11-09 ENCOUNTER — HOSPITAL ENCOUNTER (EMERGENCY)
Facility: HOSPITAL | Age: 57
Discharge: HOME | End: 2022-11-09
Attending: EMERGENCY MEDICINE

## 2022-11-09 VITALS
HEART RATE: 76 BPM | TEMPERATURE: 97.9 F | OXYGEN SATURATION: 96 % | SYSTOLIC BLOOD PRESSURE: 179 MMHG | RESPIRATION RATE: 18 BRPM | DIASTOLIC BLOOD PRESSURE: 81 MMHG

## 2022-11-09 DIAGNOSIS — R04.0 BLEEDING FROM THE NOSE: Primary | ICD-10-CM

## 2022-11-09 RX ORDER — OXYMETAZOLINE HYDROCHLORIDE 0.05 G/100ML
2 SPRAY NASAL ONCE
Status: COMPLETED | OUTPATIENT
Start: 2022-11-09 | End: 2022-11-09

## 2022-11-09 RX ADMIN — OXYMETAZOLINE HYDROCHLORIDE 2 SPRAY: 0.05 SPRAY NASAL at 21:32

## 2022-11-10 ENCOUNTER — HOSPITAL ENCOUNTER (EMERGENCY)
Facility: HOSPITAL | Age: 57
Discharge: HOME | End: 2022-11-10
Attending: EMERGENCY MEDICINE

## 2022-11-10 VITALS
HEART RATE: 84 BPM | BODY MASS INDEX: 40.02 KG/M2 | DIASTOLIC BLOOD PRESSURE: 72 MMHG | SYSTOLIC BLOOD PRESSURE: 156 MMHG | RESPIRATION RATE: 18 BRPM | OXYGEN SATURATION: 98 % | WEIGHT: 249 LBS | HEIGHT: 66 IN

## 2022-11-10 DIAGNOSIS — R51.9 HEADACHE: ICD-10-CM

## 2022-11-10 DIAGNOSIS — I10 HYPERTENSION: Primary | ICD-10-CM

## 2022-11-10 LAB
ANION GAP SERPL CALCULATED.3IONS-SCNC: 2 MMOL/L (ref 4–13)
APTT PPP: 34 SECONDS (ref 23–37)
BASOPHILS # BLD AUTO: 0.04 THOUSANDS/ÂΜL (ref 0–0.1)
BASOPHILS NFR BLD AUTO: 1 % (ref 0–1)
BUN SERPL-MCNC: 25 MG/DL (ref 5–25)
CALCIUM SERPL-MCNC: 8.9 MG/DL (ref 8.3–10.1)
CHLORIDE SERPL-SCNC: 106 MMOL/L (ref 96–108)
CO2 SERPL-SCNC: 31 MMOL/L (ref 21–32)
CREAT SERPL-MCNC: 4.58 MG/DL (ref 0.6–1.3)
EOSINOPHIL # BLD AUTO: 0.55 THOUSAND/ÂΜL (ref 0–0.61)
EOSINOPHIL NFR BLD AUTO: 9 % (ref 0–6)
ERYTHROCYTE [DISTWIDTH] IN BLOOD BY AUTOMATED COUNT: 14.2 % (ref 11.6–15.1)
GFR SERPL CREATININE-BSD FRML MDRD: 10 ML/MIN/1.73SQ M
GLUCOSE SERPL-MCNC: 122 MG/DL (ref 65–140)
HCT VFR BLD AUTO: 24.9 % (ref 34.8–46.1)
HGB BLD-MCNC: 8 G/DL (ref 11.5–15.4)
IMM GRANULOCYTES # BLD AUTO: 0.03 THOUSAND/UL (ref 0–0.2)
IMM GRANULOCYTES NFR BLD AUTO: 1 % (ref 0–2)
INR PPP: 1.74 (ref 0.84–1.19)
LYMPHOCYTES # BLD AUTO: 1.08 THOUSANDS/ÂΜL (ref 0.6–4.47)
LYMPHOCYTES NFR BLD AUTO: 17 % (ref 14–44)
MCH RBC QN AUTO: 31 PG (ref 26.8–34.3)
MCHC RBC AUTO-ENTMCNC: 32.1 G/DL (ref 31.4–37.4)
MCV RBC AUTO: 97 FL (ref 82–98)
MONOCYTES # BLD AUTO: 0.55 THOUSAND/ÂΜL (ref 0.17–1.22)
MONOCYTES NFR BLD AUTO: 9 % (ref 4–12)
NEUTROPHILS # BLD AUTO: 4 THOUSANDS/ÂΜL (ref 1.85–7.62)
NEUTS SEG NFR BLD AUTO: 63 % (ref 43–75)
NRBC BLD AUTO-RTO: 0 /100 WBCS
PLATELET # BLD AUTO: 186 THOUSANDS/UL (ref 149–390)
PMV BLD AUTO: 11.2 FL (ref 8.9–12.7)
POTASSIUM SERPL-SCNC: 4.5 MMOL/L (ref 3.5–5.3)
PROTHROMBIN TIME: 20.6 SECONDS (ref 11.6–14.5)
RBC # BLD AUTO: 2.58 MILLION/UL (ref 3.81–5.12)
SODIUM SERPL-SCNC: 139 MMOL/L (ref 135–147)
WBC # BLD AUTO: 6.25 THOUSAND/UL (ref 4.31–10.16)

## 2022-11-10 RX ORDER — METOCLOPRAMIDE HYDROCHLORIDE 5 MG/ML
10 INJECTION INTRAMUSCULAR; INTRAVENOUS ONCE
Status: COMPLETED | OUTPATIENT
Start: 2022-11-10 | End: 2022-11-10

## 2022-11-10 RX ORDER — OXYCODONE HYDROCHLORIDE 5 MG/1
5 TABLET ORAL ONCE
Status: COMPLETED | OUTPATIENT
Start: 2022-11-10 | End: 2022-11-10

## 2022-11-10 RX ORDER — ACETAMINOPHEN 325 MG/1
975 TABLET ORAL ONCE
Status: COMPLETED | OUTPATIENT
Start: 2022-11-10 | End: 2022-11-10

## 2022-11-10 RX ADMIN — METOCLOPRAMIDE HYDROCHLORIDE 10 MG: 5 INJECTION INTRAMUSCULAR; INTRAVENOUS at 14:08

## 2022-11-10 RX ADMIN — ACETAMINOPHEN 975 MG: 325 TABLET ORAL at 13:47

## 2022-11-10 RX ADMIN — OXYCODONE HYDROCHLORIDE 5 MG: 5 TABLET ORAL at 14:17

## 2022-11-10 NOTE — ED PROVIDER NOTES
History  Chief Complaint   Patient presents with   • Nose Bleed     Pt has had a nose bleed since this morning, also c/o headache  Pt takes coumadin      Patient is a 68-year-old female presenting to the emergency department for evaluation of a nose bleed  Patient states she normally takes Coumadin however she has not taken for the last 3 days because she has a procedure coming up  Patient states that this morning she noticed that she had a nose bleed that would intermittently  Over the course of the day  Patient states that it was still bleeding prior to arrival so she wanted to come and get evaluated  She denies any headache, dizziness, tinnitus, lightheadedness, hearing change, chest pain, shortness breath, nausea, vomiting, diarrhea, constipation, dysuria, hematuria  Patient states she has just been holding pressure on her nose however the bleeding had stopped prior to arrival   She still wanted to come and get evaluated  Prior to Admission Medications   Prescriptions Last Dose Informant Patient Reported? Taking? ALPRAZolam (XANAX) 0 25 mg tablet  Self Yes No   Sig: Take 0 25 mg by mouth 2 (two) times a day as needed for anxiety   ALPRAZolam (XANAX) 0 5 mg tablet   Yes No   Sig: TAKE 2 AND 1/2 TABLETS DAILY IN DIVIDED DOSES AS DIRECTED  Accu-Chek Guide test strip   Yes No   Sig: use to TEST BLOOD SUGAR two to three times a day   Accu-Chek Softclix Lancets lancets  Self Yes No   Sig: 3 (three) times a day Use to test blood sugar   B Complex-C-Folic Acid (Natalia-Denys) TABS   No No   Sig: TAKE ONE TABLET BY MOUTH DAILY   KIONEX 15 GM/60ML suspension  Self Yes No   Sig: Take by mouth Takes as a shake on dialysis days Tues-Thurs_Sat    Lidocaine 4 % OINT   No No   Sig: Apply 1 application topically 4 (four) times a day as needed (pain)   Multiple Vitamin (MULTIVITAMIN ADULT PO)   Yes No   Sig: Take 500 mg by mouth in the morning   Indications: one a day menapause multivitamin   NOVOLOG FLEXPEN 100 units/mL SOPN  Self Yes No   Sig: INJECT 1 TO 5 UNITS SUBCUTANEOUSLY THREE TIMES A DAY BEFORE MELAS AS PER SLIDING SCALE   acetaminophen (TYLENOL) 325 mg tablet   No No   Sig: Take 3 tablets (975 mg total) by mouth every 8 (eight) hours   albuterol (ProAir HFA) 90 mcg/act inhaler   No No   Sig: Inhale 2 puffs every 6 (six) hours as needed for wheezing or shortness of breath   amLODIPine (NORVASC) 5 mg tablet   No No   Sig: Take 1 tablet (5 mg total) by mouth daily   atorvastatin (LIPITOR) 20 mg tablet  Self Yes No   Sig: Take 20 mg by mouth every evening    calcitriol (ROCALTROL) 0 25 mcg capsule   No No   Sig: Take 1 capsule (0 25 mcg total) by mouth 3 (three) times a week With dialysis   carvedilol (COREG) 25 mg tablet   No No   Sig: TAKE ONE TABLET BY MOUTH TWICE A DAY   dicyclomine (BENTYL) 10 mg capsule   Yes No   Sig: TAKE 1 TABLET BY MOUTH EVERY 6 HOURS OR AS NEEDED FOR PAIN   docusate sodium (COLACE) 100 mg capsule   No No   Sig: Take 1 capsule (100 mg total) by mouth 2 (two) times a day   levothyroxine 50 mcg tablet  Self Yes No   Sig: Take 50 mcg by mouth daily   lidocaine (LIDODERM) 5 %   Yes No   lidocaine (LIDODERM) 5 %   No No   Sig: Apply 1 patch topically daily Remove & Discard patch within 12 hours or as directed by MD   loratadine (CLARITIN) 10 mg tablet  Self Yes No   Sig: Take 10 mg by mouth daily   melatonin 3 mg   No No   Sig: Take 1 tablet (3 mg total) by mouth daily at bedtime   menthol-methyl salicylate (BENGAY) 48-24 % cream   No No   Sig: Apply topically 4 (four) times a day as needed (muscle soreness)   nystatin (MYCOSTATIN) powder  Self No No   Sig: Apply topically 2 (two) times a day   ondansetron (ZOFRAN-ODT) 8 mg disintegrating tablet   Yes No   pantoprazole (PROTONIX) 40 mg tablet   Yes No   Sig: take 1 tablet by mouth twice a day   polyethylene glycol (MIRALAX) 17 g packet   No No   Sig: Take 17 g by mouth daily   pregabalin (LYRICA) 50 mg capsule   No No   Sig: Take 1 PO daily, take 1 PO additionally directly have HD on HD days   senna (SENOKOT) 8 6 mg  Self No No   Sig: Take 2 tablets (17 2 mg total) by mouth daily at bedtime as needed for constipation   sertraline (ZOLOFT) 50 mg tablet   No No   Sig: Take 1 5 tablets (75 mg total) by mouth daily   sevelamer (RENAGEL) 800 mg tablet   No No   Sig: Take 2 tablets (1,600 mg total) by mouth 3 (three) times a day with meals   urea (CARMOL) 40 %   No No   Sig: APPLY TO AFFECTED AREA TOPICALLY EVERY DAY   warfarin (COUMADIN) 3 mg tablet   No No   Sig: Take 2 tablets (6 mg total) by mouth daily Take 6 mg starting     Call PCP to discuss getting INR checked this week      Facility-Administered Medications: None       Past Medical History:   Diagnosis Date   • Abnormal uterine bleeding (AUB)    • Anemia    • Anxiety    • Arthritis    • Chronic kidney disease    • Claustrophobia    • Diabetes mellitus ([facility] )    • Disease of thyroid gland    • DVT (deep venous thrombosis) ([facility] )    • End stage kidney disease ([facility] )    • Foot ulcer due to secondary DM ([facility] )    • Hemodialysis patient ([facility] )     Ansley Ching, Sat   • Hypertension    • 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[facility] )     right eye   • Morbid obesity ([facility] )    • Osteomyelitis of fifth toe of right foot ([facility] )    • Panic attacks    • Pulmonary embolism (H[facility])    • Reflux esophagitis    • Thrombophlebitis of saphenous vein    • Warfarin anticoagulation        Past Surgical History:   Procedure Laterality Date   • AMPUTATION      r 4th toe   • ARTERIOVENOUS GRAFT PLACEMENT     • CATARACT EXTRACTION Bilateral    • CERVICAL BIOPSY  W/ LOOP ELECTRODE EXCISION     •  SECTION     • DIALYSIS FISTULA CREATION     • DILATION AND CURETTAGE OF UTERUS     • ENDOMETRIAL ABLATION W/ NOVASURE     • EYE SURGERY     • HYSTERECTOMY      @ age 57 (complete)   • IR AV FISTULAGRAM/GRAFTOGRAM  10/11/2019   • IR AV FISTULAGRAM/GRAFTOGRAM  2020   • IR AV FISTULAGRAM/GRAFTOGRAM  2020   • IR NON-TUNNELED CENTRAL LINE PLACEMENT  6/29/2021   • IR NON-TUNNELED CENTRAL LINE PLACEMENT  10/4/2021   • OOPHORECTOMY Bilateral     @ age 57   • PERICARDIUM SURGERY     • MI AMPUTATION FOOT,TRANSMETATARSAL Right 12/26/2021    Procedure: AMPUTATION TRANSMETATARSAL (TMA);  Surgeon: Mary Witt DPM;  Location: [facility];  Service: Podiatry   • MI AMPUTATION TOE,MT-P JT Right 5/28/2020    Procedure: AMPUTATION TOE- 5th;  Surgeon: Mary Witt DPM;  Location: [facility];  Service: Podiatry   • MI COLONOSCOPY FLX DX W/COLLJ SPEC WHEN PFRMD N/A 3/13/2019    Procedure: COLONOSCOPY;  Surgeon: Diony Cabrera MD;  Location: [facility]; Service: Gastroenterology   • MI ESOPHAGOGASTRODUODENOSCOPY TRANSORAL DIAGNOSTIC N/A 3/13/2019    Procedure: ESOPHAGOGASTRODUODENOSCOPY (EGD); Surgeon: Diony Cabrera MD;  Location: [facility]; Service: Gastroenterology   • MI LAPAROSCOPY W TOT HYSTERECT UTERUS 250 GRAM OR LESS N/A 2/16/2016    Procedure: HYSTERECTOMY LAPAROSCOPIC TOTAL ([address]), with bilateral salpingectomy;  Surgeon: Jakob Walters DO;  Location: [facility];  Service: Gynecology   • THROMBECTOMY / ARTERIOVENOUS GRAFT REVISION     • TOE SURGERY Right     removal of the 4th toe   • WOUND DEBRIDEMENT Right 10/8/2021    Procedure: R 1st MTPJ washout ;  Surgeon: Ruchi Wright DPM;  Location: [facility];  Service: Podiatry       Family History   Problem Relation Age of Onset   • Heart disease Family    • Diabetes Family    • Heart disease Mother    • Cancer Brother         neck   • Throat cancer Brother    • Ovarian cancer Maternal Aunt 36     I have reviewed and agree with the history as documented      E-Cigarette/Vaping   • E-Cigarette Use Never User      E-Cigarette/Vaping Substances   • Nicotine No    • THC No    • CBD No    • Flavoring No    • Other No    • Unknown No      Social History     Tobacco Use   • Smoking status: Never Smoker   • Smokeless tobacco: Never Used   Vaping Use   • Vaping Use: Never used   Substance Use Topics   • Alcohol use: Never     Alcohol/week: 0 0 standard drinks   • Drug use: No        Review of Systems   Constitutional: Positive for activity change  Negative for chills and fever  HENT: Positive for nosebleeds  Negative for ear pain, sinus pressure, sneezing, sore throat and tinnitus  Eyes: Negative for pain and visual disturbance  Respiratory: Negative for cough, chest tightness and shortness of breath  Cardiovascular: Negative for chest pain and palpitations  Gastrointestinal: Negative for abdominal pain, constipation, diarrhea, nausea and vomiting  Genitourinary: Negative for dysuria and hematuria  Musculoskeletal: Negative for arthralgias and back pain  Skin: Negative for color change and rash  Neurological: Negative for dizziness, seizures, syncope, weakness, light-headedness, numbness and headaches  Psychiatric/Behavioral: Negative for agitation  All other systems reviewed and are negative  Physical Exam  ED Triage Vitals [11/09/22 2121]   Temperature Pulse Respirations Blood Pressure SpO2   97 9 °F (36 6 °C) 76 18 (!) 179/81 96 %      Temp Source Heart Rate Source Patient Position - Orthostatic VS BP Location FiO2 (%)   Tympanic Monitor Lying Right arm --      Pain Score       --             Orthostatic Vital Signs  Vitals:    11/09/22 2121   BP: (!) 179/81   Pulse: 76   Patient Position - Orthostatic VS: Lying       Physical Exam  Vitals and nursing note reviewed  Constitutional:       General: She is not in acute distress  Appearance: Normal appearance  She is well-developed  HENT:      Head: Normocephalic and atraumatic  Right Ear: External ear normal       Left Ear: External ear normal       Nose:      Comments: Left naris with dried blood     Mouth/Throat:      Mouth: Mucous membranes are moist    Eyes:      Extraocular Movements: Extraocular movements intact  Conjunctiva/sclera: Conjunctivae normal       Pupils: Pupils are equal, round, and reactive to light  Cardiovascular:      Rate and Rhythm: Normal rate and regular rhythm  Heart sounds: Normal heart sounds  No murmur heard  Pulmonary:      Effort: Pulmonary effort is normal  No respiratory distress  Breath sounds: Normal breath sounds  No wheezing  Abdominal:      General: Abdomen is flat  Palpations: Abdomen is soft  Tenderness: There is no abdominal tenderness  There is no guarding or rebound  Musculoskeletal:         General: Normal range of motion  Cervical back: Normal range of motion and neck supple  No rigidity  Right lower leg: No edema  Left lower leg: No edema  Skin:     General: Skin is warm and dry  Capillary Refill: Capillary refill takes less than 2 seconds  Neurological:      General: No focal deficit present  Mental Status: She is alert and oriented to person, place, and time  Cranial Nerves: No cranial nerve deficit  Sensory: No sensory deficit  Motor: No weakness  Psychiatric:         Mood and Affect: Mood normal          Behavior: Behavior normal          ED Medications  Medications   oxymetazoline (AFRIN) 0 05 % nasal spray 2 spray (2 sprays Each Nare Given by Other 11/9/22 2132)       Diagnostic Studies  Results Reviewed     None                 No orders to display         Procedures  Procedures      ED Course  ED Course as of 11/10/22 0813   Wed Nov 09, 2022 2136 Blood Pressure(!): 179/81   2136 Temperature: 97 9 °F (36 6 °C)   2136 Temp Source: Tympanic   2136 Pulse: 76   2136 Respirations: 18   2136 Afrin in nose clip was applied to the bleeding nose  2150 SpO2: 96 %  Patient is a 82-year-old female presenting to the emergency department for evaluation of left nares bleeding  Physical exam looking in the patient's nose there is a blood clot holding back bleeding nose  I asked the patient to blow the nose and appears to have slight bleeding in the back of the nares    Decision was made to administer Afrin and put a nasal clip on the patient's nose  Informed patient that she can use Afrin as well as nasal clipping to see if the bleeding stops at home if this ever were to occur again  Patient was signed out prior to disposition however informed the next doctor that if the bleeding did not stop to consider TXA  I informed the patient that we will be changing care of doctors  Advised her to follow-up with her primary care doctor in a few days for re-evaluation  I advised her to consider following up with ENT if this continued to occurred again  She was also advised to come back to the hospital she develops any new, worsening or concerning symptoms  Patient was aware she had no questions or concerns she was stable at the time of my sign out  SBIRT 22yo+    Flowsheet Row Most Recent Value   SBIRT (25 yo +)    In order to provide better care to our patients, we are screening all of our patients for alcohol and drug use  Would it be okay to ask you these screening questions? No Filed at: 11/09/2022 2144                MDM    Disposition  Final diagnoses:   Bleeding from the nose     Time reflects when diagnosis was documented in both MDM as applicable and the Disposition within this note     Time User Action Codes Description Comment    11/9/2022  9:52 PM Zachery Stanford Add [R04 0] Bleeding from the nose       ED Disposition     ED Disposition   Discharge    Condition   Stable    Date/Time   Wed Nov 9, 2022 10:27 PM    Comment   Maira Ferguson discharge to home/self care                 Follow-up Information     Follow up With Specialties Details Why Contact Info Additional Information    Bhargavi Rice MD Internal Medicine Schedule an appointment as soon as possible for a visit  for follow up [address] [place] [address]  [phone]       [address] [place] [address] Emergency Department Emergency Medicine Go to  As needed, If symptoms worsen [address] Emergency Department, [address], [address] [facility] ENT Otolaryngology Schedule an appointment as soon as possible for a visit  for follow up [address] [facility] [address]  [address] ENT, [address] [place], [place], [address] [phone]          Discharge Medication List as of 11/9/2022 10:30 PM      CONTINUE these medications which have NOT CHANGED    Details   Accu-Chek Guide test strip use to TEST BLOOD SUGAR two to three times a day, Historical Med      Accu-Chek Softclix Lancets lancets 3 (three) times a day Use to test blood sugar, Starting Wed 12/30/2020, Historical Med      acetaminophen (TYLENOL) 325 mg tablet Take 3 tablets (975 mg total) by mouth every 8 (eight) hours, Starting Sun 8/21/2022, No Print      albuterol (ProAir HFA) 90 mcg/act inhaler Inhale 2 puffs every 6 (six) hours as needed for wheezing or shortness of breath, Starting Sat 7/3/2021, Normal      !! ALPRAZolam (XANAX) 0 25 mg tablet Take 0 25 mg by mouth 2 (two) times a day as needed for anxiety, Historical Med      !! ALPRAZolam (XANAX) 0 5 mg tablet TAKE 2 AND 1/2 TABLETS DAILY IN DIVIDED DOSES AS DIRECTED , Historical Med      amLODIPine (NORVASC) 5 mg tablet Take 1 tablet (5 mg total) by mouth daily, Starting Wed 9/7/2022, Normal      atorvastatin (LIPITOR) 20 mg tablet Take 20 mg by mouth every evening , Starting Tue 4/24/2018, Historical Med      B Complex-C-Folic Acid (Natalia-Denys) TABS TAKE ONE TABLET BY MOUTH DAILY, Normal      calcitriol (ROCALTROL) 0 25 mcg capsule Take 1 capsule (0 25 mcg total) by mouth 3 (three) times a week With dialysis, Starting Thu 9/8/2022, Normal      carvedilol (COREG) 25 mg tablet TAKE ONE TABLET BY MOUTH TWICE A DAY, Normal      dicyclomine (BENTYL) 10 mg capsule TAKE 1 TABLET BY MOUTH EVERY 6 HOURS OR AS NEEDED FOR PAIN, Historical Med      docusate sodium (COLACE) 100 mg capsule Take 1 capsule (100 mg total) by mouth 2 (two) times a day, Starting Wed 9/7/2022, Normal      KIONEX 15 GM/60ML suspension Take by mouth Takes as a shake on dialysis days Tues-Thurs_Sat , Starting Mon 12/10/2018, Historical Med      levothyroxine 50 mcg tablet Take 50 mcg by mouth daily, Historical Med      !! lidocaine (LIDODERM) 5 % Starting Wed 4/20/2022, Historical Med      !! lidocaine (LIDODERM) 5 % Apply 1 patch topically daily Remove & Discard patch within 12 hours or as directed by MD, Starting Fri 8/5/2022, Normal      Lidocaine 4 % OINT Apply 1 application topically 4 (four) times a day as needed (pain), Starting Wed 9/7/2022, Normal      loratadine (CLARITIN) 10 mg tablet Take 10 mg by mouth daily, Historical Med      melatonin 3 mg Take 1 tablet (3 mg total) by mouth daily at bedtime, Starting Sat 11/13/2021, Normal      menthol-methyl salicylate (BENGAY) 45-68 % cream Apply topically 4 (four) times a day as needed (muscle soreness), Starting Sun 8/21/2022, No Print      Multiple Vitamin (MULTIVITAMIN ADULT PO) Take 500 mg by mouth in the morning   Indications: one a day menapause multivitamin, Historical Med      NOVOLOG FLEXPEN 100 units/mL SOPN INJECT 1 TO 5 UNITS SUBCUTANEOUSLY THREE TIMES A DAY BEFORE MELAS AS PER SLIDING SCALE, Historical Med      nystatin (MYCOSTATIN) powder Apply topically 2 (two) times a day, Starting Sat 8/31/2019, Print      ondansetron (ZOFRAN-ODT) 8 mg disintegrating tablet Starting Wed 4/27/2022, Historical Med      pantoprazole (PROTONIX) 40 mg tablet take 1 tablet by mouth twice a day, Historical Med      polyethylene glycol (MIRALAX) 17 g packet Take 17 g by mouth daily, Starting Sun 8/21/2022, No Print      pregabalin (LYRICA) 50 mg capsule Take 1 PO daily, take 1 PO additionally directly have HD on HD days, Normal      senna (SENOKOT) 8 6 mg Take 2 tablets (17 2 mg total) by mouth daily at bedtime as needed for constipation, Starting Fri 2/21/2020, Normal      sertraline (ZOLOFT) 50 mg tablet Take 1 5 tablets (75 mg total) by mouth daily, Starting Sun 8/21/2022, No Print      sevelamer (RENAGEL) 800 mg tablet Take 2 tablets (1,600 mg total) by mouth 3 (three) times a day with meals, Starting Sat 11/13/2021, Normal      urea (CARMOL) 40 % APPLY TO AFFECTED AREA TOPICALLY EVERY DAY, Normal      warfarin (COUMADIN) 3 mg tablet Take 2 tablets (6 mg total) by mouth daily Take 6 mg starting 9/8  Call PCP to discuss getting INR checked this week, Starting Thu 9/8/2022, No Print       !! - Potential duplicate medications found  Please discuss with provider  No discharge procedures on file  PDMP Review       Value Time User    PDMP Reviewed  Yes 9/6/2022  4:01 PM Bri Hines MD           ED Provider  Attending physically available and evaluated Canary January  I managed the patient along with the ED Attending      Electronically Signed by         Marcus Rondon DO  11/10/22 7232

## 2022-11-10 NOTE — ED ATTENDING ATTESTATION
11/9/2022  I, Nerissa Cotton MD, saw and evaluated the patient  I have discussed the patient with the resident/non-physician practitioner and agree with the resident's/non-physician practitioner's findings, Plan of Care, and MDM as documented in the resident's/non-physician practitioner's note, except where noted  All available labs and Radiology studies were reviewed  I was present for key portions of any procedure(s) performed by the resident/non-physician practitioner and I was immediately available to provide assistance  At this point I agree with the current assessment done in the Emergency Department    I have conducted an independent evaluation of this patient a history and physical is as follows:  Patient has a nose bleed predominantly right-sided it has stopped at the present time she had been on Coumadin up until 3 days ago when she stopped for procedure no headache no other bleeding she is not lightheaded no visual changes  Exam dry blood is noted in the right [place] is clear there is no active bleeding  Afrin and pressure  ED Course         Critical Care Time  Procedures

## 2022-11-10 NOTE — ED PROVIDER NOTES
History  Chief Complaint   Patient presents with   • High Blood Pressure     Patients reports home care nurse took her blood pressure and it the systolic was 164P  Per EMS, patient's bp was normal       HPI  Headache started yesterday while having nose bleed, frontal  tues thurs sat, got exrta treatment on Friday  Extra treatment tomorrow   Prior to Admission Medications   Prescriptions Last Dose Informant Patient Reported? Taking? ALPRAZolam (XANAX) 0 25 mg tablet  Self Yes No   Sig: Take 0 25 mg by mouth 2 (two) times a day as needed for anxiety   ALPRAZolam (XANAX) 0 5 mg tablet   Yes No   Sig: TAKE 2 AND 1/2 TABLETS DAILY IN DIVIDED DOSES AS DIRECTED  Accu-Chek Guide test strip   Yes No   Sig: use to TEST BLOOD SUGAR two to three times a day   Accu-Chek Softclix Lancets lancets  Self Yes No   Sig: 3 (three) times a day Use to test blood sugar   B Complex-C-Folic Acid (Natalia-Denys) TABS   No No   Sig: TAKE ONE TABLET BY MOUTH DAILY   KIONEX 15 GM/60ML suspension  Self Yes No   Sig: Take by mouth Takes as a shake on dialysis days Tues-Thurs_Sat    Lidocaine 4 % OINT   No No   Sig: Apply 1 application topically 4 (four) times a day as needed (pain)   Multiple Vitamin (MULTIVITAMIN ADULT PO)   Yes No   Sig: Take 500 mg by mouth in the morning   Indications: one a day menapause multivitamin   NOVOLOG FLEXPEN 100 units/mL SOPN  Self Yes No   Sig: INJECT 1 TO 5 UNITS SUBCUTANEOUSLY THREE TIMES A DAY BEFORE MELAS AS PER SLIDING SCALE   acetaminophen (TYLENOL) 325 mg tablet   No No   Sig: Take 3 tablets (975 mg total) by mouth every 8 (eight) hours   albuterol (ProAir HFA) 90 mcg/act inhaler   No No   Sig: Inhale 2 puffs every 6 (six) hours as needed for wheezing or shortness of breath   amLODIPine (NORVASC) 5 mg tablet   No No   Sig: Take 1 tablet (5 mg total) by mouth daily   atorvastatin (LIPITOR) 20 mg tablet  Self Yes No   Sig: Take 20 mg by mouth every evening    calcitriol (ROCALTROL) 0 25 mcg capsule   No No Sig: Take 1 capsule (0 25 mcg total) by mouth 3 (three) times a week With dialysis   carvedilol (COREG) 25 mg tablet   No No   Sig: TAKE ONE TABLET BY MOUTH TWICE A DAY   dicyclomine (BENTYL) 10 mg capsule   Yes No   Sig: TAKE 1 TABLET BY MOUTH EVERY 6 HOURS OR AS NEEDED FOR PAIN   docusate sodium (COLACE) 100 mg capsule   No No   Sig: Take 1 capsule (100 mg total) by mouth 2 (two) times a day   levothyroxine 50 mcg tablet  Self Yes No   Sig: Take 50 mcg by mouth daily   lidocaine (LIDODERM) 5 %   Yes No   lidocaine (LIDODERM) 5 %   No No   Sig: Apply 1 patch topically daily Remove & Discard patch within 12 hours or as directed by MD   loratadine (CLARITIN) 10 mg tablet  Self Yes No   Sig: Take 10 mg by mouth daily   melatonin 3 mg   No No   Sig: Take 1 tablet (3 mg total) by mouth daily at bedtime   menthol-methyl salicylate (BENGAY) 37-80 % cream   No No   Sig: Apply topically 4 (four) times a day as needed (muscle soreness)   nystatin (MYCOSTATIN) powder  Self No No   Sig: Apply topically 2 (two) times a day   ondansetron (ZOFRAN-ODT) 8 mg disintegrating tablet   Yes No   pantoprazole (PROTONIX) 40 mg tablet   Yes No   Sig: take 1 tablet by mouth twice a day   polyethylene glycol (MIRALAX) 17 g packet   No No   Sig: Take 17 g by mouth daily   pregabalin (LYRICA) 50 mg capsule   No No   Sig: Take 1 PO daily, take 1 PO additionally directly have HD on HD days   senna (SENOKOT) 8 6 mg  Self No No   Sig: Take 2 tablets (17 2 mg total) by mouth daily at bedtime as needed for constipation   sertraline (ZOLOFT) 50 mg tablet   No No   Sig: Take 1 5 tablets (75 mg total) by mouth daily   sevelamer (RENAGEL) 800 mg tablet   No No   Sig: Take 2 tablets (1,600 mg total) by mouth 3 (three) times a day with meals   urea (CARMOL) 40 %   No No   Sig: APPLY TO AFFECTED AREA TOPICALLY EVERY DAY   warfarin (COUMADIN) 3 mg tablet   No No   Sig: Take 2 tablets (6 mg total) by mouth daily Take 6 mg starting 9/8    Call PCP to discuss getting INR checked this week      Facility-Administered Medications: None       Past Medical History:   Diagnosis Date   • Abnormal uterine bleeding (AUB)    • Anemia    • Anxiety    • Arthritis    • Chronic kidney disease    • Claustrophobia    • Diabetes mellitus ([place] [id number] )    • Disease of thyroid gland    • DVT (deep venous thrombosis) ([place] [address] )    • End stage kidney disease (HCC)    • Foot ulcer due to secondary DM ([place] [address] )    • Hemodialysis patient ([place] [address] )     Ansley Ching, Sat   • Hypertension    • 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[place] [address] )     right eye   • Morbid obesity ([place] [address] )    • Osteomyelitis of fifth toe of right foot (HCC)    • Panic attacks    • Pulmonary embolism (HCC)    • Reflux esophagitis    • Thrombophlebitis of saphenous vein    • Warfarin anticoagulation        Past Surgical History:   Procedure Laterality Date   • AMPUTATION      r 4th toe   • ARTERIOVENOUS GRAFT PLACEMENT     • CATARACT EXTRACTION Bilateral    • CERVICAL BIOPSY  W/ LOOP ELECTRODE EXCISION     •  SECTION     • DIALYSIS FISTULA CREATION     • DILATION AND CURETTAGE OF UTERUS     • ENDOMETRIAL ABLATION W/ NOVASURE     • EYE SURGERY     • HYSTERECTOMY      @ age 57 (complete)   • IR AV FISTULAGRAM/GRAFTOGRAM  10/11/2019   • IR AV FISTULAGRAM/GRAFTOGRAM  2020   • IR AV FISTULAGRAM/GRAFTOGRAM  2020   • IR NON-TUNNELED CENTRAL LINE PLACEMENT  2021   • IR NON-TUNNELED CENTRAL LINE PLACEMENT  10/4/2021   • OOPHORECTOMY Bilateral     @ age 57   • PERICARDIUM SURGERY     • ME AMPUTATION FOOT,TRANSMETATARSAL Right 2021    Procedure: AMPUTATION TRANSMETATARSAL (TMA);  Surgeon: Luis A Kinney DPM;  [facility];  Service: Podiatry   • ME AMPUTATION TOE,MT-P JT Right 2020    Procedure: AMPUTATION TOE- 5th;  Surgeon: Luis A Kinney DPM;  [facility];  Service: Podiatry   • ME COLONOSCOPY FLX DX W/COLLJ SPEC WHEN PFRMD N/A 3/13/2019    Procedure: COLONOSCOPY;  Surgeon: Renae Wilson MD;  Location: [facility]; Service: Gastroenterology   • MD ESOPHAGOGASTRODUODENOSCOPY TRANSORAL DIAGNOSTIC N/A 3/13/2019    Procedure: ESOPHAGOGASTRODUODENOSCOPY (EGD); Surgeon: Veda Shrestha MD;  Location: [facility]; Service: Gastroenterology   • MD LAPAROSCOPY W TOT HYSTERECT UTERUS 250 GRAM OR LESS N/A 2/16/2016    Procedure: HYSTERECTOMY LAPAROSCOPIC TOTAL ([address]), with bilateral salpingectomy;  Surgeon: Kingsley Watson DO;  [facility];  Service: Gynecology   • THROMBECTOMY / ARTERIOVENOUS GRAFT REVISION     • TOE SURGERY Right     removal of the 4th toe   • WOUND DEBRIDEMENT Right 10/8/2021    Procedure: R 1st MTPJ washout ;  Surgeon: Carey Bob DPM;  [facility];  Service: Podiatry       Family History   Problem Relation Age of Onset   • Heart disease Family    • Diabetes Family    • Heart disease Mother    • Cancer Brother         neck   • Throat cancer Brother    • Ovarian cancer Maternal Aunt 36     I have reviewed and agree with the history as documented      E-Cigarette/Vaping   • E-Cigarette Use Never User      E-Cigarette/Vaping Substances   • Nicotine No    • THC No    • CBD No    • Flavoring No    • Other No    • Unknown No      Social History     Tobacco Use   • Smoking status: Never Smoker   • Smokeless tobacco: Never Used   Vaping Use   • Vaping Use: Never used   Substance Use Topics   • Alcohol use: Never     Alcohol/week: 0 0 standard drinks   • Drug use: No        Review of Systems    Physical Exam  ED Triage Vitals [11/10/22 1304]   Temp Pulse Respirations Blood Pressure SpO2   -- 78 18 (!) 180/75 97 %      Temp src Heart Rate Source Patient Position - Orthostatic VS BP Location FiO2 (%)   -- Monitor Lying Right arm --      Pain Score       8             Orthostatic Vital Signs  Vitals:    11/10/22 1304   BP: (!) 180/75   Pulse: 78   Patient Position - Orthostatic VS: Lying       Physical Exam    ED Medications  Medications - No data to display    Diagnostic Studies  Results Reviewed     None No orders to display         Procedures  Procedures      ED Course                                       MDM    Disposition  Final diagnoses:   None     ED Disposition     None      Follow-up Information    None         Patient's Medications   Discharge Prescriptions    No medications on file     No discharge procedures on file  PDMP Review       Value Time User    PDMP Reviewed  Yes 9/6/2022  4:01 PM Elder Flores MD           ED Provider  Attending physically available and evaluated Akilah Rain  I managed the patient along with the ED Attending      Electronically Signed by There is no abdominal tenderness  Musculoskeletal:         General: No tenderness  Normal range of motion  Cervical back: Normal range of motion and neck supple  No rigidity  Skin:     General: Skin is warm  Findings: No erythema or rash  Neurological:      General: No focal deficit present  Mental Status: She is alert and oriented to person, place, and time  Cranial Nerves: No cranial nerve deficit  Sensory: No sensory deficit  Motor: No weakness        Coordination: Coordination normal    Psychiatric:         Mood and Affect: Mood normal          ED Medications  Medications   acetaminophen (TYLENOL) tablet 975 mg (975 mg Oral Given 11/10/22 1347)   metoclopramide (REGLAN) injection 10 mg (10 mg Intravenous Given 11/10/22 1408)   oxyCODONE (ROXICODONE) IR tablet 5 mg (5 mg Oral Given 11/10/22 1417)       Diagnostic Studies  Results Reviewed     Procedure Component Value Units Date/Time    Protime-INR [[id number]]  (Abnormal) Collected: 11/10/22 1455    Lab Status: Final result Specimen: Blood from Arm, Right Updated: 11/10/22 1526     Protime 20 6 seconds      INR 1 74    APTT [[id number]]  (Normal) Collected: 11/10/22 1455    Lab Status: Final result Specimen: Blood from Arm, Right Updated: 11/10/22 1526     PTT 34 seconds     Basic metabolic panel [[id number]]  (Abnormal) Collected: 11/10/22 1403    Lab Status: Final result Specimen: Blood from Arm, Right Updated: 11/10/22 1436     Sodium 139 mmol/L      Potassium 4 5 mmol/L      Chloride 106 mmol/L      CO2 31 mmol/L      ANION GAP 2 mmol/L      BUN 25 mg/dL      Creatinine 4 58 mg/dL      Glucose 122 mg/dL      Calcium 8 9 mg/dL      eGFR 10 ml/min/1 73sq m     Narrative:      [place] guidelines for Chronic Kidney Disease (CKD):   •  Stage 1 with normal or high GFR (GFR > 90 mL/min/1 73 square meters)  •  Stage 2 Mild CKD (GFR = 60-89 mL/min/1 73 square meters)  •  Stage 3A Moderate CKD (GFR = 45-59 mL/min/1 73 square meters)  •  Stage 3B Moderate CKD (GFR = 30-44 mL/min/1 73 square meters)  •  Stage 4 Severe CKD (GFR = 15-29 mL/min/1 73 square meters)  •  Stage 5 End Stage CKD (GFR <15 mL/min/1 73 square meters)  Note: GFR calculation is accurate only with a steady state creatinine    CBC and differential [[id number]]  (Abnormal) Collected: 11/10/22 1403    Lab Status: Final result Specimen: Blood from Arm, Right Updated: 11/10/22 1420     WBC 6 25 Thousand/uL      RBC 2 58 Million/uL      Hemoglobin 8 0 g/dL      Hematocrit 24 9 %      MCV 97 fL      MCH 31 0 pg      MCHC 32 1 g/dL      RDW 14 2 %      MPV 11 2 fL      Platelets 249 Thousands/uL      nRBC 0 /100 WBCs      Neutrophils Relative 63 %      Immat GRANS % 1 %      Lymphocytes Relative 17 %      Monocytes Relative 9 %      Eosinophils Relative 9 %      Basophils Relative 1 %      Neutrophils Absolute 4 00 Thousands/µL      Immature Grans Absolute 0 03 Thousand/uL      Lymphocytes Absolute 1 08 Thousands/µL      Monocytes Absolute 0 55 Thousand/µL      Eosinophils Absolute 0 55 Thousand/µL      Basophils Absolute 0 04 Thousands/µL                  No orders to display         Procedures  Procedures      ED Course                                       MDM  Number of Diagnoses or Management Options  61-year-old female presents with history of hypertension ESRD with hemodialysis Tuesday Thursday and Saturday, hypothyroidism presents with complaint elevated blood pressure  Headache high blood pressure  Patient is evaluated by Nephrology emergency department and is cleared  Patient's headache is treated with medications here in the emergency department patient has significant relief  Labs unremarkable  Patient appears well on exam is safe for discharge given follow-up and return precautions  Portions of the record may have been created with voice recognition software   Occasional wrong word or "sound a like" substitutions may have occurred due to the inherent limitations of voice recognition software  Read the chart carefully and recognize, using context, where substitutions have occurred  Disposition  Final diagnoses:   Hypertension   Headache     Time reflects when diagnosis was documented in both MDM as applicable and the Disposition within this note     Time User Action Codes Description Comment    11/10/2022  3:52 PM Cristiana Bautista [I10] Hypertension     11/10/2022  3:52 PM Cristiana Bautista [R51 9] Headache       ED Disposition     ED Disposition   Discharge    Condition   Stable    Date/Time   Thu Nov 10, 2022  3:52 PM    Comment   Blease Moose discharge to home/self care                 Follow-up Information     Follow up With Specialties Details Why [address] [facility] Emergency Department Emergency Medicine Go to  If symptoms worsen or if you have additional concerns [address] [id number]  [address] [facility] [address] Emergency Department, [address], [place], [address] [facility], [address]    Nam Huffman MD Internal Medicine   [address] [facility]  [address] [facility]  Larry Rucker [address]  [phone]             Discharge Medication List as of 11/10/2022  5:02 PM      CONTINUE these medications which have NOT CHANGED    Details   Accu-Chek Guide test strip use to TEST BLOOD SUGAR two to three times a day, Historical Med      Accu-Chek Softclix Lancets lancets 3 (three) times a day Use to test blood sugar, Starting Wed 12/30/2020, Historical Med      acetaminophen (TYLENOL) 325 mg tablet Take 3 tablets (975 mg total) by mouth every 8 (eight) hours, Starting Sun 8/21/2022, No Print      albuterol (ProAir HFA) 90 mcg/act inhaler Inhale 2 puffs every 6 (six) hours as needed for wheezing or shortness of breath, Starting Sat 7/3/2021, Normal      !! ALPRAZolam (XANAX) 0 25 mg tablet Take 0 25 mg by mouth 2 (two) times a day as needed for anxiety, Historical Med      !! ALPRAZolam (XANAX) 0 5 mg tablet TAKE 2 AND 1/2 TABLETS DAILY IN DIVIDED DOSES AS DIRECTED , Historical Med      amLODIPine (NORVASC) 5 mg tablet Take 1 tablet (5 mg total) by mouth daily, Starting Wed 9/7/2022, Normal      atorvastatin (LIPITOR) 20 mg tablet Take 20 mg by mouth every evening , Starting Tue 4/24/2018, Historical Med      B Complex-C-Folic Acid (Natalia-Denys) TABS TAKE ONE TABLET BY MOUTH DAILY, Normal      calcitriol (ROCALTROL) 0 25 mcg capsule Take 1 capsule (0 25 mcg total) by mouth 3 (three) times a week With dialysis, Starting Thu 9/8/2022, Normal      carvedilol (COREG) 25 mg tablet TAKE ONE TABLET BY MOUTH TWICE A DAY, Normal      dicyclomine (BENTYL) 10 mg capsule TAKE 1 TABLET BY MOUTH EVERY 6 HOURS OR AS NEEDED FOR PAIN, Historical Med      docusate sodium (COLACE) 100 mg capsule Take 1 capsule (100 mg total) by mouth 2 (two) times a day, Starting Wed 9/7/2022, Normal      KIONEX 15 GM/60ML suspension Take by mouth Takes as a shake on dialysis days Tues-Thurs_Sat , Starting Mon 12/10/2018, Historical Med      levothyroxine 50 mcg tablet Take 50 mcg by mouth daily, Historical Med      !! lidocaine (LIDODERM) 5 % Starting Wed 4/20/2022, Historical Med      !! lidocaine (LIDODERM) 5 % Apply 1 patch topically daily Remove & Discard patch within 12 hours or as directed by MD, Starting Fri 8/5/2022, Normal      Lidocaine 4 % OINT Apply 1 application topically 4 (four) times a day as needed (pain), Starting Wed 9/7/2022, Normal      loratadine (CLARITIN) 10 mg tablet Take 10 mg by mouth daily, Historical Med      melatonin 3 mg Take 1 tablet (3 mg total) by mouth daily at bedtime, Starting Sat 11/13/2021, Normal      menthol-methyl salicylate (BENGAY) 06-05 % cream Apply topically 4 (four) times a day as needed (muscle soreness), Starting Sun 8/21/2022, No Print      Multiple Vitamin (MULTIVITAMIN ADULT PO) Take 500 mg by mouth in the morning   Indications: one a day menapause multivitamin, Historical Med      NOVOLOG FLEXPEN 100 units/mL SOPN INJECT 1 TO 5 UNITS SUBCUTANEOUSLY THREE TIMES A DAY BEFORE MELAS AS PER SLIDING SCALE, Historical Med      nystatin (MYCOSTATIN) powder Apply topically 2 (two) times a day, Starting Sat 8/31/2019, Print      ondansetron (ZOFRAN-ODT) 8 mg disintegrating tablet Starting Wed 4/27/2022, Historical Med      pantoprazole (PROTONIX) 40 mg tablet take 1 tablet by mouth twice a day, Historical Med      polyethylene glycol (MIRALAX) 17 g packet Take 17 g by mouth daily, Starting Sun 8/21/2022, No Print      pregabalin (LYRICA) 50 mg capsule Take 1 PO daily, take 1 PO additionally directly have HD on HD days, Normal      senna (SENOKOT) 8 6 mg Take 2 tablets (17 2 mg total) by mouth daily at bedtime as needed for constipation, Starting Fri 2/21/2020, Normal      sertraline (ZOLOFT) 50 mg tablet Take 1 5 tablets (75 mg total) by mouth daily, Starting Sun 8/21/2022, No Print      sevelamer (RENAGEL) 800 mg tablet Take 2 tablets (1,600 mg total) by mouth 3 (three) times a day with meals, Starting Sat 11/13/2021, Normal      urea (CARMOL) 40 % APPLY TO AFFECTED AREA TOPICALLY EVERY DAY, Normal      warfarin (COUMADIN) 3 mg tablet Take 2 tablets (6 mg total) by mouth daily Take 6 mg starting 9/8  Call PCP to discuss getting INR checked this week, Starting Thu 9/8/2022, No Print       !! - Potential duplicate medications found  Please discuss with provider  No discharge procedures on file  PDMP Review       Value Time User    PDMP Reviewed  Yes 9/6/2022  4:01 PM Mela Hope MD           ED Provider  Attending physically available and evaluated Ary Miller  I managed the patient along with the ED Attending      Electronically Signed by         Damion Cárdenas, DO  11/20/22 2289 [facility], DO  11/20/22 3386

## 2022-11-10 NOTE — QUICK NOTE
Patient is ESRD on HD recently discharged from [facility] and undergoing dialysis at [address]  Not able to achieve target weight of 113 kg  She does have trace lower extremity edema  Presented to the ED as her visiting home nurse found blood pressure elevated to systolic 681M to 797 and told her to go to the ED  Patient did not take her antihypertensive medication today as she usually takes after dialysis treatment  Also complains of dizziness on standing after dialysis today   -she was in the ED yesterday with epistaxis which is resolved   -discussed with ED resident, plan to repeat labs and check INR and if stable patient is stable for discharge from Nephrology side  Discussed with outpatient dialysis unit, plan for extra dialysis treatment tomorrow at 11:00 a m  Leland Gao Patient was informed and she verbalized understanding and is willing to go for extra treatment tomorrow  Hope to achieve target weight , she was significantly above target weight  Also possible that she likely has some body weight gain and may need to increase target weight if not able to achieve    Stressed on compliance with the treatment, she has been cutting short her treatments

## 2022-12-05 NOTE — ED ATTENDING ATTESTATION
7/29/2022  I, Octavio Dumont MD, saw and evaluated the patient  I have discussed the patient with the resident/non-physician practitioner and agree with the resident's/non-physician practitioner's findings, Plan of Care, and MDM as documented in the resident's/non-physician practitioner's note, except where noted  All available labs and Radiology studies were reviewed  I was present for key portions of any procedure(s) performed by the resident/non-physician practitioner and I was immediately available to provide assistance  At this point I agree with the current assessment done in the Emergency Department    I have conducted an independent evaluation of this patient a history and physical is as follows:  esrd   On tues thurs sat  Had dialysis yesterday   Had MRI on July 12     Here with lower back pain on the right   Seen this week in ED for the same   No weakness no numbness     Temp of 100 2 on Monday none since   Has appt  With neurology  And pain management   Normal bm yesterday  Does not produce urine   The patient was admitted in July around the 12th to 14th for COVID-19 she was complaining of her chronic low back pains and MRI was performed there is increased signal in the L4-L5 disc area  There is a herniated disc  Patient was seen by Neurosurgery and Infectious Disease they did not feel this was diskitis or osteomyelitis and they have scheduled a repeat MRI and follow-up on August 11th  She is here for continued pain would like to be admitted to the hospital  She walks with a walker  Exam she has tenderness to her lower back right paraspinal musculature midline area she is able to stand and walk  She has no numbness in her groin  Will obtain labs give pain control re-evaluate  ED Course         Critical Care Time  Procedures Physical Therapy Evaluation    Visit Type: Daily Treatment Note -  Initial Evaluation  Visit: 1  Referring Provider: Willian Marie MD  Medical Diagnosis (from order): Diagnosis Information    Diagnosis  715.95 (ICD-9-CM) - M16.11 (ICD-10-CM) - Osteoarthritis of right hip       Treatment Diagnosis: right hip with increased pain/symptoms, impaired range of motion, impaired muscle length/flexibility, impaired mobility, impaired activity tolerance, impaired gait and impaired strength.  Onset  - Date of onset:  months ago   Chart reviewed at time of initial evaluation (relevant co-morbidities, allergies, tests and medications listed):   Past Medical History:  No date: Essential (primary) hypertension  No date: H/O emphysema  No date: High cholesterol  No date: RAD (reactive airway disease)      SUBJECTIVE                                                                                                               Pt states that she has always been an active woman working and exercising regularly. States that since the pain started in the hip and more recently in the neck, it has limited her ability to move and exercise. States that she saw her doctor and talked about this. He recommended trying some PT.    Pain / Symptoms  - Pain/symptom is: intermittent  - Pain rating (out of 10): Current: 6 ; Best: 2; Worst: 8  - Location: R hip   - Quality / Description: sore, stiff, ache, throbbing, tight  - Alleviating Factors: heat, avoiding movement in involved area, change in position, rest, relaxation techniques  - Progression since onset: worsening    Function:   Limitations / Exacerbation Factors:   - Patient reports difficulty, increased time and pain with function reported below.  Prior Level of Function: declining function, therefore referred to therapy,    Patient Goals: increased motion, decreased pain, increased strength and independence with ADLs/IADLs.    Prior treatment  - no therapies  - Discharged from hospital,  home health, or skilled nursing facility in last 30 days: no  Home Environment   - Patient lives with: alone  - Type of home: apartment  - Assistance available: as needed  - Denies 2 or more falls or an unexplained fall with injury in the last year.  - Feel safe at home / work / school: yes      OBJECTIVE                                                                                                                     Range of Motion (ROM)   (degrees unless noted; active unless noted; norms in ( ); negative=lacking to 0, positive=beyond 0)  WFL: LLE, RLE, except as noted  Hip:   - Flexion (100-120):      • Right: 100   Passive: 120   - Extension (20-30):      • Right:  5   Passive: 10   - Internal Rotation (45-50):     • Right: pain     - in supine:        • Right: 10   Passive: 15   - External Rotation (45-50):     • Right: pain     - in supine:        • Right: 20   Passive: 25    Strength  (out of 5 unless noted, standard test position unless noted)   WFL: LLE, RLE, except as noted  Hip:    - Flexion:        • Right: 4-, pain    - Extension:        • Right: 3+, 4-    - Abduction:        • Right: 3+, 4-    - Adduction:        • Right: 4-, 4    - Internal Rotation:        • Right: 4-, 4    - External Rotation:        • Right: 4-, 4                    Outcome/Assessments  Outcome Measures:   Lower Extremity Functional Scale: LEFS Calculated Total: 44 (0=extreme difficulty; 80=no difficulty) see flowsheet for additional documentation        Treatment     Therapeutic Exercise  NU STEP L2 x 10 min for aerobic conditioning  Stair lunge stretching 15'' holds x 5 each way   Supine SKC, ltr's, piriformis, HS stretching   Supine SLR\"s, bridging     Skilled input: verbal instruction/cues and tactile instruction/cues        ASSESSMENT                                                                                                          75 year old patient has reported functional limitations listed above impacted by signs  and symptoms consistent with treatment diagnosis below.  Treatment Diagnosis:   - Involved: right hip.  - Symptoms/impairments: increased pain/symptoms, impaired range of motion, impaired muscle length/flexibility, impaired mobility, impaired activity tolerance, impaired gait and impaired strength.  Pain/symptoms after session (out of 10): 2    Prognosis: Patient will benefit from skilled therapy.  Rehabilitative potential is: good.  Predicted patient presentation: Low (stable) - Patient comorbidities and complexities, as defined above, will have little effect on progress for prescribed plan of care.    Education:   - Present and ready to learn: patient  - Results of above outlined education: Verbalizes understanding    PLAN                                                                                                                         The following skilled interventions to be implemented to achieve goals listed below:  Neuromuscular Re-Education (72170)  Therapeutic Activity (05829)  Therapeutic Exercise (41643)  Manual Therapy (73219)    Frequency / Duration  2 times per week tapering as patient progresses for 5 weeks for an estimated total of 10 visits    Patient involved in and agreed to plan of care and goals.  Attendance policy reviewed with patient.      Goals  Long Term Goals: to be met by end of plan of care  1.   decrease hip pain consistently to 2/10 or better allowing for less limited LE based ADL's, LEFS 55/80 or better for improved quality of life.   2.   increase hip motion flexion to 120 degrees, functional ER to 35 degrees, IR to 15 degrees allowing for less limited LE dressing.  3.   increase hip strength grossly by 1/2 muscle grade or more allowing for less limited, better LE valgus control when squatting, negotiating stairs.       Therapy procedure time and total treatment time can be found documented on the Time Entry flowsheet

## 2023-01-31 NOTE — ASSESSMENT & PLAN NOTE
· Patient was started on antibiotics during the recent hospital stay due to concern for cellulitis of the right wrist region from previous IV site  · Complete PO Doxycycline course 8/14 No

## 2023-02-09 DIAGNOSIS — N18.6 ESRD (END STAGE RENAL DISEASE) (HCC): Primary | ICD-10-CM

## 2023-02-09 DIAGNOSIS — Z71.89 ADVANCE CARE PLANNING: ICD-10-CM

## 2023-02-09 DIAGNOSIS — F41.9 ANXIETY: ICD-10-CM

## 2023-02-27 ENCOUNTER — HOSPITAL ENCOUNTER (EMERGENCY)
Facility: HOSPITAL | Age: 58
End: 2023-02-27
Attending: EMERGENCY MEDICINE

## 2023-02-27 DIAGNOSIS — R09.2 RESPIRATORY ARREST (HCC): ICD-10-CM

## 2023-02-27 DIAGNOSIS — I46.9 CARDIAC ARREST (HCC): Primary | ICD-10-CM

## 2023-02-27 DIAGNOSIS — N18.6 ESRD (END STAGE RENAL DISEASE) ON DIALYSIS (HCC): ICD-10-CM

## 2023-02-27 DIAGNOSIS — Z99.2 ESRD (END STAGE RENAL DISEASE) ON DIALYSIS (HCC): ICD-10-CM

## 2023-02-27 RX ORDER — EPINEPHRINE 0.1 MG/ML
4 SYRINGE (ML) INJECTION ONCE
Status: COMPLETED | OUTPATIENT
Start: 2023-02-27 | End: 2023-02-27

## 2023-02-27 RX ORDER — EPINEPHRINE 0.1 MG/ML
SYRINGE (ML) INJECTION CODE/TRAUMA/SEDATION MEDICATION
Status: COMPLETED | OUTPATIENT
Start: 2023-02-27 | End: 2023-02-27

## 2023-02-27 RX ORDER — CALCIUM CHLORIDE 100 MG/ML
SYRINGE (ML) INTRAVENOUS CODE/TRAUMA/SEDATION MEDICATION
Status: COMPLETED | OUTPATIENT
Start: 2023-02-27 | End: 2023-02-27

## 2023-02-27 RX ORDER — SODIUM BICARBONATE 84 MG/ML
INJECTION, SOLUTION INTRAVENOUS CODE/TRAUMA/SEDATION MEDICATION
Status: COMPLETED | OUTPATIENT
Start: 2023-02-27 | End: 2023-02-27

## 2023-02-27 RX ADMIN — EPINEPHRINE 1 MG: 0.1 INJECTION, SOLUTION ENDOTRACHEAL; INTRACARDIAC; INTRAVENOUS at 18:48

## 2023-02-27 RX ADMIN — CALCIUM CHLORIDE 1 G: 100 INJECTION PARENTERAL at 18:39

## 2023-02-27 RX ADMIN — EPINEPHRINE 1 MG: 0.1 INJECTION, SOLUTION ENDOTRACHEAL; INTRACARDIAC; INTRAVENOUS at 18:42

## 2023-02-27 RX ADMIN — SODIUM BICARBONATE 50 MEQ: 84 INJECTION, SOLUTION INTRAVENOUS at 18:36

## 2023-02-27 RX ADMIN — EPINEPHRINE 1 MG: 0.1 INJECTION, SOLUTION ENDOTRACHEAL; INTRACARDIAC; INTRAVENOUS at 18:39

## 2023-02-27 RX ADMIN — CALCIUM CHLORIDE 1 G: 100 INJECTION PARENTERAL at 18:36

## 2023-02-27 RX ADMIN — EPINEPHRINE 1 MG: 0.1 INJECTION, SOLUTION ENDOTRACHEAL; INTRACARDIAC; INTRAVENOUS at 18:36

## 2023-02-27 RX ADMIN — EPINEPHRINE 1 MG: 0.1 INJECTION, SOLUTION ENDOTRACHEAL; INTRACARDIAC; INTRAVENOUS at 18:32

## 2023-02-27 RX ADMIN — EPINEPHRINE 1 MG: 0.1 INJECTION, SOLUTION ENDOTRACHEAL; INTRACARDIAC; INTRAVENOUS at 18:45

## 2023-02-27 NOTE — ED PROVIDER NOTES
History  Chief Complaint   Patient presents with   • Cardiac Arrest     67 yo F with ESRD, type 2 diabetes on insulin, hypertension, anemia of chronic disease, PE on warfarin presented to the ED in cardiac arrest via EMS  Per EMS and family report, patient had been admitted in the hospital for the past 2 days and discharged home today  She had been feeling fine, walking around the house  Her  was outside when family member came running out of the house to grab him and tell him that the patient had collapsed  According to this family member, patient had suddenly colapsed to her knees and then to the ground  Patient appeared to turn blue/purple before she collapsed  She noted to have foaming at her mouth  Family called 911, did not start CPR  EMS tells me that patient was pulseless and blue on arrival she had a heart rate in the 20s and was apneic  Blood sugar in the 200s  Patient was coded by EMS for approximately 15 minutes in the field prior to my receiving me on call for cardiac arrest   ETA was 2 to 3 minutes  Patient was intubated by EMS   tells me that patient was admitted at [facility] for hypoxia secondary to pulmonary edema for the past 2 days  She was discharged this morning and was feeling well  She had dialysis today and tolerated it without incident  He tells me that she was told she would need another session of dialysis tomorrow for management of her fluid overload  Prior to Admission Medications   Prescriptions Last Dose Informant Patient Reported? Taking? ALPRAZolam (XANAX) 0 25 mg tablet   Yes No   Sig: Take 0 25 mg by mouth 2 (two) times a day as needed for anxiety   ALPRAZolam (XANAX) 0 5 mg tablet   Yes No   Sig: TAKE 2 AND 1/2 TABLETS DAILY IN DIVIDED DOSES AS DIRECTED     Accu-Chek Guide test strip   Yes No   Sig: use to TEST BLOOD SUGAR two to three times a day   Accu-Chek Softclix Lancets lancets   Yes No   Sig: 3 (three) times a day Use to test blood sugar   B Complex-C-Folic Acid (Natalia-Denys) TABS   No No   Sig: TAKE ONE TABLET BY MOUTH DAILY   KIONEX 15 GM/60ML suspension   Yes No   Sig: Take by mouth Takes as a shake on dialysis days Tues-Thurs_Sat    Lidocaine 4 % OINT   No No   Sig: Apply 1 application topically 4 (four) times a day as needed (pain)   Multiple Vitamin (MULTIVITAMIN ADULT PO)   Yes No   Sig: Take 500 mg by mouth in the morning   Indications: one a day menapause multivitamin   NOVOLOG FLEXPEN 100 units/mL SOPN   Yes No   Sig: INJECT 1 TO 5 UNITS SUBCUTANEOUSLY THREE TIMES A DAY BEFORE MELAS AS PER SLIDING SCALE   acetaminophen (TYLENOL) 325 mg tablet   No No   Sig: Take 3 tablets (975 mg total) by mouth every 8 (eight) hours   albuterol (ProAir HFA) 90 mcg/act inhaler   No No   Sig: Inhale 2 puffs every 6 (six) hours as needed for wheezing or shortness of breath   amLODIPine (NORVASC) 5 mg tablet   No No   Sig: Take 1 tablet (5 mg total) by mouth daily   atorvastatin (LIPITOR) 20 mg tablet   Yes No   Sig: Take 20 mg by mouth every evening    calcitriol (ROCALTROL) 0 25 mcg capsule   No No   Sig: Take 1 capsule (0 25 mcg total) by mouth 3 (three) times a week With dialysis   carvedilol (COREG) 25 mg tablet   No No   Sig: TAKE ONE TABLET BY MOUTH TWICE A DAY   dicyclomine (BENTYL) 10 mg capsule   Yes No   Sig: TAKE 1 TABLET BY MOUTH EVERY 6 HOURS OR AS NEEDED FOR PAIN   docusate sodium (COLACE) 100 mg capsule   No No   Sig: Take 1 capsule (100 mg total) by mouth 2 (two) times a day   levothyroxine 50 mcg tablet   Yes No   Sig: Take 50 mcg by mouth daily   lidocaine (LIDODERM) 5 %   Yes No   lidocaine (LIDODERM) 5 %   No No   Sig: Apply 1 patch topically daily Remove & Discard patch within 12 hours or as directed by MD   loratadine (CLARITIN) 10 mg tablet   Yes No   Sig: Take 10 mg by mouth daily   melatonin 3 mg   No No   Sig: Take 1 tablet (3 mg total) by mouth daily at bedtime   menthol-methyl salicylate (BENGAY) 38-90 % cream   No No Sig: Apply topically 4 (four) times a day as needed (muscle soreness)   nystatin (MYCOSTATIN) powder   No No   Sig: Apply topically 2 (two) times a day   ondansetron (ZOFRAN-ODT) 8 mg disintegrating tablet   Yes No   pantoprazole (PROTONIX) 40 mg tablet   Yes No   Sig: take 1 tablet by mouth twice a day   polyethylene glycol (MIRALAX) 17 g packet   No No   Sig: Take 17 g by mouth daily   pregabalin (LYRICA) 50 mg capsule   No No   Sig: Take 1 PO daily, take 1 PO additionally directly have HD on HD days   senna (SENOKOT) 8 6 mg   No No   Sig: Take 2 tablets (17 2 mg total) by mouth daily at bedtime as needed for constipation   sertraline (ZOLOFT) 50 mg tablet   No No   Sig: Take 1 5 tablets (75 mg total) by mouth daily   sevelamer (RENAGEL) 800 mg tablet   No No   Sig: Take 2 tablets (1,600 mg total) by mouth 3 (three) times a day with meals   urea (CARMOL) 40 %   No No   Sig: APPLY TO AFFECTED AREA TOPICALLY EVERY DAY   warfarin (COUMADIN) 3 mg tablet   No No   Sig: Take 2 tablets (6 mg total) by mouth daily Take 6 mg starting 9/8    Call PCP to discuss getting INR checked this week      Facility-Administered Medications: None       Past Medical History:   Diagnosis Date   • Abnormal uterine bleeding (AUB)    • Anemia    • Anxiety    • Arthritis    • Chronic kidney disease    • Claustrophobia    • Diabetes mellitus ([facility] )    • Disease of thyroid gland    • DVT (deep venous thrombosis) ([facility])    • End stage kidney disease ([facility])    • Foot ulcer due to secondary DM ([facility] )    • Hemodialysis patient ([facility] )     Tues, Thurs, Sat   • Hypertension    • 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[facility] )     right eye   • Morbid obesity ([facility] )    • Osteomyelitis of fifth toe of right foot ([facility] )    • Panic attacks    • Pulmonary embolism (HCC)    • Reflux esophagitis    • Thrombophlebitis of saphenous vein    • Warfarin anticoagulation        Past Surgical History:   Procedure Laterality Date   • AMPUTATION      r 4th toe   • ARTERIOVENOUS GRAFT PLACEMENT     • CATARACT EXTRACTION Bilateral    • CERVICAL BIOPSY  W/ LOOP ELECTRODE EXCISION     •  SECTION     • DIALYSIS FISTULA CREATION     • DILATION AND CURETTAGE OF UTERUS     • ENDOMETRIAL ABLATION W/ NOVASURE     • EYE SURGERY     • HYSTERECTOMY      @ age 57 (complete)   • IR AV FISTULAGRAM/GRAFTOGRAM  10/11/2019   • IR AV FISTULAGRAM/GRAFTOGRAM  2020   • IR AV FISTULAGRAM/GRAFTOGRAM  2020   • IR NON-TUNNELED CENTRAL LINE PLACEMENT  2021   • IR NON-TUNNELED CENTRAL LINE PLACEMENT  10/4/2021   • OOPHORECTOMY Bilateral     @ age 57   • PERICARDIUM SURGERY     • IL AMPUTATION FOOT,TRANSMETATARSAL Right 2021    Procedure: AMPUTATION TRANSMETATARSAL (TMA);  Surgeon: Dacia Gamez DPM;  Location: [facility];  Service: Podiatry   • IL AMPUTATION TOE,MT-P JT Right 2020    Procedure: AMPUTATION TOE- 5th;  Surgeon: Dacia Gamez DPM;  Location: [facility];  Service: Podiatry   • IL COLONOSCOPY FLX DX W/COLLJ SPEC WHEN PFRMD N/A 3/13/2019    Procedure: COLONOSCOPY;  Surgeon: Namrata Molina MD;  Location: [facility]; Service: Gastroenterology   • IL ESOPHAGOGASTRODUODENOSCOPY TRANSORAL DIAGNOSTIC N/A 3/13/2019    Procedure: ESOPHAGOGASTRODUODENOSCOPY (EGD); Surgeon: Namrata Molina MD;  Location: [facility];   Service: Gastroenterology   • IL LAPAROSCOPY W TOT HYSTERECT UTERUS 250 GRAM OR LESS N/A 2016    Procedure: HYSTERECTOMY LAPAROSCOPIC TOTAL ([address]), with bilateral salpingectomy;  Surgeon: Juliette Cueva DO;  Location: [facility];  Service: Gynecology   • THROMBECTOMY / ARTERIOVENOUS GRAFT REVISION     • TOE SURGERY Right     removal of the 4th toe   • WOUND DEBRIDEMENT Right 10/8/2021    Procedure: R 1st MTPJ washout ;  Surgeon: Liborio Mohs, DPM;  Location: [facility];  Service: Podiatry       Family History   Problem Relation Age of Onset   • Heart disease Family    • Diabetes Family    • Heart disease Mother    • Cancer Brother         neck   • Throat cancer Brother    • Ovarian cancer Maternal Aunt 36     I have reviewed and agree with the history as documented  E-Cigarette/Vaping   • E-Cigarette Use Never User      E-Cigarette/Vaping Substances   • Nicotine No    • THC No    • CBD No    • Flavoring No    • Other No    • Unknown No      Social History     Tobacco Use   • Smoking status: Never   • Smokeless tobacco: Never   Vaping Use   • Vaping Use: Never used   Substance Use Topics   • Alcohol use: Never     Alcohol/week: 0 0 standard drinks   • Drug use: No        Review of Systems   Unable to perform ROS: Acuity of condition       Physical Exam  ED Triage Vitals [02/27/23 1830]   Temp Pulse Respirations BP SpO2   -- (!) 0 (!) 0 -- (!) 84 %      Temp src Heart Rate Source Patient Position - Orthostatic VS BP Location FiO2 (%)   -- -- -- -- --      Pain Score       --             Orthostatic Vital Signs  Vitals:    02/27/23 1842 02/27/23 1845 02/27/23 1848 02/27/23 1851   Pulse: (!) 202 104 (!) 148 (!) 0       Physical Exam  Constitutional:       Appearance: She is obese  Interventions: She is intubated  Comments: unresponsive   HENT:      Head: Normocephalic and atraumatic  Nose: Nose normal       Mouth/Throat:      Mouth: Mucous membranes are dry  Eyes:      Comments: Dilated, fixed, non-reactive pupils    Cardiovascular:      Comments: KAYLENE external compression device in place  Pulmonary:      Effort: She is intubated  Comments: Lung sounds appreciable bilaterally  Abdominal:      General: Abdomen is protuberant  Skin:     General: Skin is cool  Capillary Refill: Capillary refill takes more than 3 seconds  Coloration: Skin is pale  Comments: Dialysis fistula in the R upper extremity    Neurological:      Mental Status: She is unresponsive  GCS: GCS eye subscore is 1  GCS verbal subscore is 1  GCS motor subscore is 1           ED Medications  Medications   EPINEPHrine (FOR EMS ONLY) (ADRENALIN) injection 4 mg (0 mg Does not apply Given to EMS 2/27/23 1853)   EPINEPHrine (ADRENALIN) injection (1 mg Intravenous Given 2/27/23 1848)   calcium chloride 1 g/10 mL injection (1 g Intravenous Given 2/27/23 1839)   sodium bicarbonate 50 mEq/50 mL injection (50 mEq Intravenous Given 2/27/23 1836)       Diagnostic Studies  Results Reviewed     None                 No orders to display         Procedures  Procedures      ED Course                                       Medical Decision Making  67 yo F with ESRD, type 2 diabetes on insulin, hypertension, anemia of chronic disease, PE on warfarin presented to the ED in cardiac arrest via EMS  DDx: primary cardiac vs respiratory arrest      Patient received 4 doses of epinephrine prior to arrival to the ED  On arrival to the ED, patient was attached to cardiac monitor with CPR in progress  Initial pulse check PEA  5th dose of epinephrine given followed by 1g calcium gluconate and 1 amp of sodium bicarbonate  ET tube was checked for placement, with poor CO2 color change  ET tube was exchanged  CPR continued, 1more dose of epinephrine, 1 more dose of calcium gluconate without response  POC ultrasound performed, heart visualized with zero cardiac motion  On subsequent pulse change, asystole on cardiac monitor  I stepped out to speak with patient's  to update him  Discussed that we are unable to revive patient and will be ceasing CPR  Patient's  brought back and time of death was called  Cardiac arrest [facility]): acute illness or injury  ESRD (end stage renal disease) on dialysis [facility]): acute illness or injury  Respiratory arrest [facility]): acute illness or injury  Risk  Prescription drug management              Disposition  Final diagnoses:   Cardiac arrest ([facility] )   Respiratory arrest ([facility] )   ESRD (end stage renal disease) on dialysis [facility])     Time reflects when diagnosis was documented in both MDM as applicable and the Disposition within this note     Time User Action Codes Description Comment    2023  7:06 PM Alejandro Lat Add [I46 9] Cardiac arrest ([facility] )     2023  7:06 PM Alejandro Lat Add [R09 2] Respiratory arrest ([facility] )     2023  7:06 PM Alejandro Lat Add [N18 6,  Z99 2] ESRD (end stage renal disease) on dialysis [facility])       ED Disposition     ED Disposition       Condition   --    Date/Time     7:06 PM    Comment   --         Follow-up Information    None         Patient's Medications   Discharge Prescriptions    No medications on file     No discharge procedures on file  PDMP Review       Value Time User    PDMP Reviewed  Yes 2022  4:01 PM Kiesha Angel MD           ED Provider  Attending physically available and evaluated Chantal Ugalde I managed the patient along with the ED Attending      Electronically Signed by         Edu Bourgeois MD  23 [id number]       Edu Bourgeois MD  23 [id number]

## 2023-02-27 NOTE — ED PROCEDURE NOTE
Procedure  Intubation    Date/Time: 2/27/2023 6:55 PM  Performed by: Sabine Craig DO  Authorized by: Sabine Craig DO     Patient location:  ED  Other Assisting Provider: No    Consent:     Consent obtained:  Emergent situation  Pre-procedure details:     Patient status:  Unresponsive    Pretreatment medications:  None    Paralytics:  None  Indications:     Indications for intubation: respiratory failure    Procedure details:     Preoxygenation:  Bag valve mask    Intubation method:  Oral    Oral intubation technique:  Glidescope    Laryngoscope blade: Mac 3    Tube size (mm):  8 0    Tube type:  Cuffed    Number of attempts:  1    Cricoid pressure: no      Tube visualized through cords: yes    Placement assessment:     ETT to lip:  23    Tube secured with:  ETT amaya    Breath sounds:  Equal    Placement verification: chest rise, equal breath sounds, ETCO2 detector, fiberoptic scope and tube exhalation    Post-procedure details:     Patient tolerance of procedure:   Tolerated well, no immediate complications                     Sabine Craig DO  02/27/23 3747

## 2023-02-28 NOTE — ED PROCEDURE NOTE
Procedure  Central Line    Date/Time: 2/27/2023 7:13 PM  Performed by: Demetrio Martin MD  Authorized by: Demetrio Martin MD     Patient location:  ED  Other Assisting Provider: Yes (comment) Elizabet Rose)    Consent:     Consent obtained:  Emergent situation  Pre-procedure details:     Hand hygiene: Hand hygiene performed prior to insertion      Sterile barrier technique: All elements of maximal sterile technique followed      Skin preparation:  ChloraPrep  Indications:     Central line indications: medications requiring central line    Anesthesia (see MAR for exact dosages): Anesthesia method:  None  Procedure details:     Location:  Left femoral    Vessel type: vein      Laterality:  Left    Patient position:  Flat    Catheter type:  Triple lumen 20cm    Catheter size:  7 Fr    Landmarks identified: yes      Ultrasound guidance: no      Number of attempts:  1    Successful placement: yes    Post-procedure details:     Post-procedure:  Dressing applied    Assessment:  Blood return through all ports    Post-procedure complications: none      Patient tolerance of procedure:   Tolerated well, no immediate complications                     Demetrio Martin MD  02/27/23 7055

## 2023-02-28 NOTE — ED ATTENDING ATTESTATION
2/27/2023  IRich DO, saw and evaluated the patient  I have discussed the patient with the resident/non-physician practitioner and agree with the resident's/non-physician practitioner's findings, Plan of Care, and MDM as documented in the resident's/non-physician practitioner's note, except where noted  All available labs and Radiology studies were reviewed  I was present for key portions of any procedure(s) performed by the resident/non-physician practitioner and I was immediately available to provide assistance  At this point I agree with the current assessment done in the Emergency Department  I have conducted an independent evaluation of this patient a history and physical is as follows:    32-year-old female presents in cardiac arrest   Per EMS patient was down 50 minutes prior to their arrival and there was no bystander CPR, patient was intubated by EMS, received multiple doses of epi and compressions prehospital   Approximately 30 minutes total downtime prior to arrival   Patient was recently discharged from [facility] for hypoxia secondary to pulmonary edema  Did receive dialysis today  Patient has history of end-stage renal disease on dialysis, type 2 diabetes on insulin, high blood pressure, anemia of chronic disease, PE on warfarin, DVT, cardiomyopathy  On exam-in distress, no response to verbal, tactile or painful stimuli, no spontaneous breath sounds, no spontaneous heart sounds, no obvious sign of trauma, abdomen soft  Plan- see nurses note for ACLS protocol  Bedside ultrasound did not show any cardiac activity  Patient initially PEA on monitor and then asystole  Multiple doses of epinephrine, calcium given and a dose of bicarb  ED Course     Patient pronounced dead at [phone]  Family at bedside  Family requested autopsy, order placed and consent signed by family    Diagnosis: cardiac arrest secondary to arrhythmia secondary to hypoxia    ESRD on HD, DM, HTN, h/o DVT and PE    Critical Care Time  Procedures

## 2023-02-28 NOTE — ED NOTES
Paperwork regarding family autopsy request walked down to pathology and placed in autopsy mailbox        Monalisa Tan RN  02/27/23 1567

## 2023-02-28 NOTE — PROGRESS NOTES
Pastoral Care Progress Note    2023  Patient: Chayo Borden : 1965  Admission Date & Time: 2023 1834  MRN: [id number] CSN: [id number]       23   Clinical Encounter Type   Visited With Family   Routine Visit Follow-up   Crisis Visit Death   Referral From Nurse   Jew Encounters   Jew Needs Prayer                Chaplaincy Interventions Utilized:     Exploration: Facilitated life review    Relationship Building: Listened empathically and Provided silent and supportive presence    Ritual: Provided prayer    Family requested prayer after death   provided spiritual and emotional support, and prayer, with them  Support also provided with staff      Spiritual Coping Strategies Utilized:   Spiritual gratitude

## 2023-05-24 NOTE — PLAN OF CARE
Plan for 3 5 hrs of treatment on a 2 k bath for a k of 4 6 on 8/2/22  UF for 2kg as sahara  Post-Dialysis RN Treatment Note    Blood Pressure:  Pre 94/65 mm/Hg  Post 131/81 mmHg   EDW  124 kg    Weight:  Pre 125 3 kg   Post 122 8 kg   Mode of weight measurement: Standing Scale   Volume Removed  2000 ml    Treatment duration 210 minutes    NS given  No    Treatment shortened?  No   Medications given during Rx xanax 0 25 mg   Estimated Kt/V  1 21   Access type: AV fistula   Access Issues: No    Report called to primary nurse   Yes Yael Minor RN  Problem: METABOLIC, FLUID AND ELECTROLYTES - ADULT  Goal: Electrolytes maintained within normal limits  Description: INTERVENTIONS:  - Monitor labs and assess patient for signs and symptoms of electrolyte imbalances  - Administer electrolyte replacement as ordered  - Monitor response to electrolyte replacements, including repeat lab results as appropriate  - Instruct patient on fluid and nutrition as appropriate  Outcome: Progressing  Goal: Fluid balance maintained  Description: INTERVENTIONS:  - Monitor labs   - Monitor I/O and WT  - Instruct patient on fluid and nutrition as appropriate  - Assess for signs & symptoms of volume excess or deficit  Outcome: Progressing Cyclosporine Counseling:  I discussed with the patient the risks of cyclosporine including but not limited to hypertension, gingival hyperplasia,myelosuppression, immunosuppression, liver damage, kidney damage, neurotoxicity, lymphoma, and serious infections. The patient understands that monitoring is required including baseline blood pressure, CBC, CMP, lipid panel and uric acid, and then 1-2 times monthly CMP and blood pressure.

## 2023-07-07 NOTE — ASSESSMENT & PLAN NOTE
Lab Results   Component Value Date    EGFR 7 08/02/2022    EGFR 5 08/01/2022    EGFR 7 07/31/2022    CREATININE 5 70 (H) 08/02/2022    CREATININE 7 92 (H) 08/01/2022    CREATININE 5 99 (H) 07/31/2022     Nephrology following closely  Scheduled for dialysis T/T/Sa outpatient English

## 2023-09-02 NOTE — ASSESSMENT & PLAN NOTE
· Probably in setting of covid vs hemo pt   · Started on mild COVID protocol    · Currently off oxygen [Menarche Age: ____] : age at menarche was [unfilled] [Post-Menopause, No Sxs] : post-menopausal, currently without symptoms [Menopause Age: ____] : age at menopause was [unfilled] [___] : Living: [unfilled]

## 2024-01-18 NOTE — Clinical Note
Vinod Walton was seen and treated in our emergency department on 12/19/2021  Diagnosis:     Caren Chong    She may return on this date: If you have any questions or concerns, please don't hesitate to call        Vero Cole MD    ______________________________           _______________          _______________  [facility] Representative                              Date                                Time Normal for race

## 2024-03-21 NOTE — ORTHOTIC NOTE
Orthotic Note            Date: 11/6/2019      Patient Name: Carole Bey        Time: 11:40am    Reason for Consult:  Patient Active Problem List   Diagnosis    Benign hypertension with end-stage renal disease ([facility] )    History of DVT (deep vein thrombosis)    Abnormal uterine bleeding    Glaucoma    ESRD on hemodialysis ([facility] )    Anxiety disorder    Type 2 diabetes mellitus ([place] [address] )    Knee pain    Ambulatory dysfunction    Anemia in end-stage renal disease ([place] [address] )    Hemodialysis status ([place] [address] )    Primary osteoarthritis of right knee    Colon cancer screening    Gastroesophageal reflux disease    Meningioma (HCC)    Nausea & vomiting    Secondary hyperparathyroidism of renal origin ([facility] )    Other chest pain    Hyperkalemia    Chronic anemia    Diabetic foot ulcer (HCC)    Hyponatremia    Parenchymal renal hypertension    Candidiasis of breast    Hemodialysis-associated hypotension   Medium Globoped Shoe LLE Heel Wound  Per Podiatry    I measured, fit and donned Medium Globoped Shoe while patient was sitting up in bed  Patient states she is familiar with shoe and dog aet last one  Globoped Shoe is at bedside after fitting and education  My contact information is at bedside  Recommendations:  Please call Mobility Coordinator at ext  [phone] in regards to bracing instruction and/or adjustment  Lessie Bosworth Mobility Coordinator LCFo, LCOF, ASOP R  O T, O B T  Never

## 2025-02-28 NOTE — PROGRESS NOTES
Goal Outcome Evaluation:  Plan of Care Reviewed With: patient           Outcome Evaluation: Patient presented to the hospital with shortness of air, was found to have a pleural effusion and is now status post thoracentesis.  Patient reports she lives alone and is independently mobile without the use of assistive device.  She lives in a patio home with no stairs to enter.  Today patient was in bed and agreeable to therapy.  She required min assist for bed mobility, CGA to min assist to stand and to ambulate short distances with rolling walker.  Gait is slow and unsteady with several small losses of balance which required min assist to recover from.  Patient fatigues quickly with multiple short standing rest breaks required in 1 seated rest break.  Patient presents with generalized weakness impaired balance decreased endurance and functional mobility below baseline.  She would benefit from PT to address these impairments.  Recommend SNF placement to continue to address these impairments, improve independence and safety prior to returning home.    Anticipated Discharge Disposition (PT): skilled nursing facility                         Patient was seen at HD unit today  Very emotional and tearful reporting she is afraid of dying  She reports she is seeing young fellow HD patients in unit who have recently  and now she is scared  Emotional support given  She reports she does not see a counselor  Discussed with  who will work with Teresa Vigil for counseling options  Discussed with patient about the importance of staying on full  treatment to maintain adequacy  Discussed palliative care to discuss/assist with her chronic medical issues, management and life goals in the setting of ERSD  She verbally is in agreement for referral to palliative care

## 2025-07-08 NOTE — OP NOTE
[facility] APPOINTMENT CONFIRMATION      Date: 7/11/25    Time: 10:30 AM     Provider name: Dr. Carlo Aquino    Location: Neurology Locations: [place]: [facility] [place] [address]    Appointment confirmed: No and left phone message    \"Please ensure you bring the medication bottles, including the dates and times you take each medication.\"  New Patients: \"Please bring any outside records or images.\"    \"We do have free parking available in the main hospital parking lot. However, parking can be difficult to find so we ask that you arrive 40 minutes prior to your appointment.\"       OPERATIVE REPORT  PATIENT NAME: Bonnei Rivera    :  1965  MRN: [id number]  Pt Location: [facility] ROOM 03    SURGERY DATE: 3/13/2019    Surgeon(s) and Role:     * Alcira Berry MD - Primary    Colonoscopy Procedure Note    Procedure: Colonoscopy    Sedation: Monitored anesthesia care, check anesthesia records      ASA Class: 3    INDICATIONS:  Colon cancer screening    POST-OP DIAGNOSIS: See the impression below    Procedure Details     Prior colonoscopy: No prior colonoscopy  Informed consent was obtained for the procedure, including sedation  Risks of perforation, hemorrhage, adverse drug reaction and aspiration were discussed  The patient was placed in the left lateral decubitus position  Based on the pre-procedure assessment, including review of the patient's medical history, medications, allergies, and review of systems, she had been deemed to be an appropriate candidate for conscious sedation; she was therefore sedated with the medications listed below  The patient was monitored continuously with telemetry, pulse oximetry, blood pressure monitoring, and direct observations  A rectal examination was performed  The colonoscope was inserted into the rectum and advanced under direct vision to the cecum, which was identified by the ileocecal valve and appendiceal orifice  The quality of the colonic preparation was good  A careful inspection was made as the colonoscope was withdrawn, including a retroflexed view of the rectum; findings and interventions are described below  Findings:  Colonoscopy was performed distal endograft attachment  A 10 mm sessile polyp was removed from transverse colon using snare cautery  The polyp was retrieved  Another 5 mm polyp was removed from transverse colon using cold snare  Two small polyps each measuring 5 mm were removed from sigmoid colon using cold snare  A 6 mm flat polyp was removed from sigmoid colon using snare cautery    Mild diverticulosis noted in sigmoid colon  Medium-sized internal hemorrhoids  Complications: None; patient tolerated the procedure well  Impression:    Multiple polyps removed as outlined above  Mild diverticulosis in the sigmoid colon  Medium-sized internal hemorrhoids  Recommendations:  Repeat colonoscopy is recommended in 3 years because patient has more than 3 polyps  Follow-up biopsy result  High-fiber diet  If you have abdominal pain, bleeding or fever call my office at [phone]  COMPLICATIONS:  None; patient tolerated the procedure well      SPECIMENS:    ID Type Source Tests Collected by Time Destination   1 : gastric polyp Tissue Polyp, Stomach/Small Intestine TISSUE EXAM Serafin Sands MD 3/13/2019 1208    2 : transverse colon; cold snare  Tissue Polyp, Colorectal TISSUE EXAM Serafin Sands MD 3/13/2019 1218    3 : transverse colon;hot snare Tissue Polyp, Colorectal TISSUE EXAM Serafin Sands MD 3/13/2019 1225    4 : descending colon, cold snare Tissue Polyp, Colorectal TISSUE EXAM Serafin Sands MD 3/13/2019 1233    5 : sigmoid colon x 2, cold snare Tissue Polyp, Colorectal TISSUE EXAM Serafin Sands MD 3/13/2019 1236    6 : sigmoid polyp, cold snare Tissue Polyp, Colorectal TISSUE EXAM Serafin Sands MD 3/13/2019 1239        ESTIMATED BLOOD LOSS:  Minimal    SIGNATURE: Serafin Sands MD  DATE: March 13, 2019  TIME: 12:42 PM

## (undated) DEVICE — NEEDLE 18 G X 1 1/2

## (undated) DEVICE — INTENDED FOR TISSUE SEPARATION, AND OTHER PROCEDURES THAT REQUIRE A SHARP SURGICAL BLADE TO PUNCTURE OR CUT.: Brand: BARD-PARKER ® CARBON RIB-BACK BLADES

## (undated) DEVICE — CURITY STRETCH BANDAGE: Brand: CURITY

## (undated) DEVICE — Device: Brand: DISPOSABLE ELECTROSURGICAL SNARE

## (undated) DEVICE — SUT ETHILON 3-0 PS-1 18 IN 1663G

## (undated) DEVICE — ACE WRAP 4 IN UNSTERILE

## (undated) DEVICE — INTENDED FOR TISSUE SEPARATION, AND OTHER PROCEDURES THAT REQUIRE A SHARP SURGICAL BLADE TO PUNCTURE OR CUT.: Brand: BARD-PARKER SAFETY BLADES SIZE 15, STERILE

## (undated) DEVICE — PROXIMATE SKIN STAPLERS (35 WIDE) CONTAINS 35 STAINLESS STEEL STAPLES (FIXED HEAD): Brand: PROXIMATE

## (undated) DEVICE — GLOVE INDICATOR PI UNDERGLOVE SZ 7.5 BLUE

## (undated) DEVICE — GLOVE SRG BIOGEL 7.5

## (undated) DEVICE — GLOVE SRG BIOGEL ECLIPSE 7.5

## (undated) DEVICE — 2000CC GUARDIAN II: Brand: GUARDIAN

## (undated) DEVICE — CURITY NON-ADHERENT STRIPS: Brand: CURITY

## (undated) DEVICE — WET SKIN PREP TRAY: Brand: MEDLINE INDUSTRIES, INC.

## (undated) DEVICE — 10FR FRAZIER SUCTION HANDLE: Brand: CARDINAL HEALTH

## (undated) DEVICE — SUT ETHILON 4-0 PS-2 18 IN 1667G

## (undated) DEVICE — PENCIL ELECTROSURG E-Z CLEAN -0035H

## (undated) DEVICE — UNIVERSAL MAJOR EXTREMITY,KIT: Brand: CARDINAL HEALTH

## (undated) DEVICE — SYRINGE 10ML LL

## (undated) DEVICE — CUFF TOURNIQUET 18 X 4 IN QUICK CONNECT DISP 1 BLADDER

## (undated) DEVICE — STOCKINETTE REGULAR

## (undated) DEVICE — KERLIX BANDAGE ROLL: Brand: KERLIX

## (undated) DEVICE — NEEDLE 25G X 1 1/2

## (undated) DEVICE — SPONGE STICK WITH PVP-I: Brand: KENDALL

## (undated) DEVICE — PLUMEPEN PRO 10FT

## (undated) DEVICE — TUBING SUCTION 5MM X 12 FT

## (undated) DEVICE — GAUZE SPONGES,16 PLY: Brand: CURITY

## (undated) DEVICE — BETHLEHEM UNIVERSAL  MIONR EXT: Brand: CARDINAL HEALTH

## (undated) DEVICE — SPONGE LAP 18 X 18 IN STRL RFD

## (undated) DEVICE — SPONGE PVP SCRUB WING STERILE

## (undated) DEVICE — OCCLUSIVE GAUZE STRIP,3% BISMUTH TRIBROMOPHENATE IN PETROLATUM BLEND: Brand: XEROFORM

## (undated) DEVICE — SCD SEQUENTIAL COMPRESSION COMFORT SLEEVE MEDIUM KNEE LENGTH: Brand: KENDALL SCD

## (undated) DEVICE — PADDING CAST 4 IN  COTTON STRL